# Patient Record
Sex: FEMALE | Race: BLACK OR AFRICAN AMERICAN
[De-identification: names, ages, dates, MRNs, and addresses within clinical notes are randomized per-mention and may not be internally consistent; named-entity substitution may affect disease eponyms.]

---

## 2019-11-26 ENCOUNTER — RESULT REVIEW (OUTPATIENT)
Age: 54
End: 2019-11-26

## 2020-02-15 RX ADMIN — INSULIN GLARGINE 7 UNIT(S): 100 INJECTION, SOLUTION SUBCUTANEOUS at 22:49

## 2020-02-18 ENCOUNTER — INPATIENT (INPATIENT)
Facility: HOSPITAL | Age: 55
LOS: 5 days | Discharge: HOME HEALTH | End: 2020-02-24
Attending: HOSPITALIST | Admitting: HOSPITALIST
Payer: MEDICARE

## 2020-02-18 VITALS
HEART RATE: 114 BPM | OXYGEN SATURATION: 88 % | DIASTOLIC BLOOD PRESSURE: 76 MMHG | TEMPERATURE: 102 F | RESPIRATION RATE: 16 BRPM | SYSTOLIC BLOOD PRESSURE: 162 MMHG

## 2020-02-18 LAB
ALBUMIN SERPL ELPH-MCNC: 4.7 G/DL — SIGNIFICANT CHANGE UP (ref 3.5–5.2)
ALP SERPL-CCNC: 80 U/L — SIGNIFICANT CHANGE UP (ref 30–115)
ALT FLD-CCNC: 23 U/L — SIGNIFICANT CHANGE UP (ref 0–41)
ANION GAP SERPL CALC-SCNC: 14 MMOL/L — SIGNIFICANT CHANGE UP (ref 7–14)
APPEARANCE UR: ABNORMAL
AST SERPL-CCNC: 31 U/L — SIGNIFICANT CHANGE UP (ref 0–41)
BACTERIA # UR AUTO: ABNORMAL
BILIRUB SERPL-MCNC: 0.8 MG/DL — SIGNIFICANT CHANGE UP (ref 0.2–1.2)
BILIRUB UR-MCNC: ABNORMAL
BUN SERPL-MCNC: 12 MG/DL — SIGNIFICANT CHANGE UP (ref 10–20)
CALCIUM SERPL-MCNC: 9.9 MG/DL — SIGNIFICANT CHANGE UP (ref 8.5–10.1)
CHLORIDE SERPL-SCNC: 99 MMOL/L — SIGNIFICANT CHANGE UP (ref 98–110)
CK MB CFR SERPL CALC: 1.8 NG/ML — SIGNIFICANT CHANGE UP (ref 0.6–6.3)
CK SERPL-CCNC: 124 U/L — SIGNIFICANT CHANGE UP (ref 0–225)
CO2 SERPL-SCNC: 27 MMOL/L — SIGNIFICANT CHANGE UP (ref 17–32)
COLOR SPEC: ABNORMAL
CREAT SERPL-MCNC: 1.1 MG/DL — SIGNIFICANT CHANGE UP (ref 0.7–1.5)
DIFF PNL FLD: SIGNIFICANT CHANGE UP
EPI CELLS # UR: 21 /HPF — HIGH (ref 0–5)
FLU A RESULT: NEGATIVE — SIGNIFICANT CHANGE UP
FLU A RESULT: NEGATIVE — SIGNIFICANT CHANGE UP
FLUAV AG NPH QL: NEGATIVE — SIGNIFICANT CHANGE UP
FLUBV AG NPH QL: NEGATIVE — SIGNIFICANT CHANGE UP
GAS PNL BLDV: SIGNIFICANT CHANGE UP
GLUCOSE SERPL-MCNC: 138 MG/DL — HIGH (ref 70–99)
GLUCOSE UR QL: NEGATIVE — SIGNIFICANT CHANGE UP
HCT VFR BLD CALC: 43.6 % — SIGNIFICANT CHANGE UP (ref 37–47)
HGB BLD-MCNC: 14.5 G/DL — SIGNIFICANT CHANGE UP (ref 12–16)
HYALINE CASTS # UR AUTO: 41 /LPF — HIGH (ref 0–7)
KETONES UR-MCNC: SIGNIFICANT CHANGE UP
LEUKOCYTE ESTERASE UR-ACNC: NEGATIVE — SIGNIFICANT CHANGE UP
MCHC RBC-ENTMCNC: 29.2 PG — SIGNIFICANT CHANGE UP (ref 27–31)
MCHC RBC-ENTMCNC: 33.3 G/DL — SIGNIFICANT CHANGE UP (ref 32–37)
MCV RBC AUTO: 87.9 FL — SIGNIFICANT CHANGE UP (ref 81–99)
NITRITE UR-MCNC: NEGATIVE — SIGNIFICANT CHANGE UP
NRBC # BLD: 0 /100 WBCS — SIGNIFICANT CHANGE UP (ref 0–0)
NT-PROBNP SERPL-SCNC: 2523 PG/ML — HIGH (ref 0–300)
PH UR: 6.5 — SIGNIFICANT CHANGE UP (ref 5–8)
PLATELET # BLD AUTO: 220 K/UL — SIGNIFICANT CHANGE UP (ref 130–400)
POTASSIUM SERPL-MCNC: 4 MMOL/L — SIGNIFICANT CHANGE UP (ref 3.5–5)
POTASSIUM SERPL-SCNC: 4 MMOL/L — SIGNIFICANT CHANGE UP (ref 3.5–5)
PROT SERPL-MCNC: 8.2 G/DL — HIGH (ref 6–8)
PROT UR-MCNC: ABNORMAL
RBC # BLD: 4.96 M/UL — SIGNIFICANT CHANGE UP (ref 4.2–5.4)
RBC # FLD: 15.8 % — HIGH (ref 11.5–14.5)
RBC CASTS # UR COMP ASSIST: 3 /HPF — SIGNIFICANT CHANGE UP (ref 0–4)
RSV RESULT: POSITIVE
RSV RNA RESP QL NAA+PROBE: POSITIVE
SODIUM SERPL-SCNC: 140 MMOL/L — SIGNIFICANT CHANGE UP (ref 135–146)
SP GR SPEC: 1.03 — HIGH (ref 1.01–1.02)
TROPONIN T SERPL-MCNC: 0.01 NG/ML — SIGNIFICANT CHANGE UP
TROPONIN T SERPL-MCNC: 0.02 NG/ML — HIGH
UROBILINOGEN FLD QL: ABNORMAL
WBC # BLD: 6.59 K/UL — SIGNIFICANT CHANGE UP (ref 4.8–10.8)
WBC # FLD AUTO: 6.59 K/UL — SIGNIFICANT CHANGE UP (ref 4.8–10.8)
WBC UR QL: 28 /HPF — HIGH (ref 0–5)

## 2020-02-18 PROCEDURE — 93010 ELECTROCARDIOGRAM REPORT: CPT

## 2020-02-18 PROCEDURE — 99285 EMERGENCY DEPT VISIT HI MDM: CPT | Mod: 25

## 2020-02-18 PROCEDURE — 36000 PLACE NEEDLE IN VEIN: CPT

## 2020-02-18 PROCEDURE — 71045 X-RAY EXAM CHEST 1 VIEW: CPT | Mod: 26

## 2020-02-18 RX ORDER — FUROSEMIDE 40 MG
40 TABLET ORAL
Refills: 0 | Status: DISCONTINUED | OUTPATIENT
Start: 2020-02-18 | End: 2020-02-21

## 2020-02-18 RX ORDER — FUROSEMIDE 40 MG
1 TABLET ORAL
Qty: 0 | Refills: 0 | DISCHARGE

## 2020-02-18 RX ORDER — SODIUM CHLORIDE 9 MG/ML
1000 INJECTION INTRAMUSCULAR; INTRAVENOUS; SUBCUTANEOUS ONCE
Refills: 0 | Status: DISCONTINUED | OUTPATIENT
Start: 2020-02-18 | End: 2020-02-18

## 2020-02-18 RX ORDER — SODIUM CHLORIDE 9 MG/ML
500 INJECTION, SOLUTION INTRAVENOUS ONCE
Refills: 0 | Status: COMPLETED | OUTPATIENT
Start: 2020-02-18 | End: 2020-02-18

## 2020-02-18 RX ORDER — FUROSEMIDE 40 MG
40 TABLET ORAL ONCE
Refills: 0 | Status: COMPLETED | OUTPATIENT
Start: 2020-02-18 | End: 2020-02-18

## 2020-02-18 RX ORDER — CHLORHEXIDINE GLUCONATE 213 G/1000ML
1 SOLUTION TOPICAL
Refills: 0 | Status: DISCONTINUED | OUTPATIENT
Start: 2020-02-18 | End: 2020-02-24

## 2020-02-18 RX ORDER — ASPIRIN/CALCIUM CARB/MAGNESIUM 324 MG
162 TABLET ORAL ONCE
Refills: 0 | Status: COMPLETED | OUTPATIENT
Start: 2020-02-18 | End: 2020-02-18

## 2020-02-18 RX ORDER — METOPROLOL TARTRATE 50 MG
50 TABLET ORAL
Refills: 0 | Status: DISCONTINUED | OUTPATIENT
Start: 2020-02-18 | End: 2020-02-24

## 2020-02-18 RX ORDER — AZITHROMYCIN 500 MG/1
500 TABLET, FILM COATED ORAL ONCE
Refills: 0 | Status: COMPLETED | OUTPATIENT
Start: 2020-02-18 | End: 2020-02-18

## 2020-02-18 RX ORDER — PANTOPRAZOLE SODIUM 20 MG/1
40 TABLET, DELAYED RELEASE ORAL
Refills: 0 | Status: DISCONTINUED | OUTPATIENT
Start: 2020-02-18 | End: 2020-02-24

## 2020-02-18 RX ORDER — AMLODIPINE BESYLATE 2.5 MG/1
10 TABLET ORAL DAILY
Refills: 0 | Status: DISCONTINUED | OUTPATIENT
Start: 2020-02-18 | End: 2020-02-24

## 2020-02-18 RX ORDER — ACETAMINOPHEN 500 MG
650 TABLET ORAL EVERY 6 HOURS
Refills: 0 | Status: DISCONTINUED | OUTPATIENT
Start: 2020-02-18 | End: 2020-02-24

## 2020-02-18 RX ORDER — LISINOPRIL 2.5 MG/1
40 TABLET ORAL DAILY
Refills: 0 | Status: DISCONTINUED | OUTPATIENT
Start: 2020-02-18 | End: 2020-02-24

## 2020-02-18 RX ORDER — ASPIRIN/CALCIUM CARB/MAGNESIUM 324 MG
81 TABLET ORAL DAILY
Refills: 0 | Status: DISCONTINUED | OUTPATIENT
Start: 2020-02-19 | End: 2020-02-24

## 2020-02-18 RX ORDER — OXYBUTYNIN CHLORIDE 5 MG
5 TABLET ORAL
Refills: 0 | Status: DISCONTINUED | OUTPATIENT
Start: 2020-02-18 | End: 2020-02-24

## 2020-02-18 RX ORDER — ACETAMINOPHEN 500 MG
650 TABLET ORAL ONCE
Refills: 0 | Status: COMPLETED | OUTPATIENT
Start: 2020-02-18 | End: 2020-02-18

## 2020-02-18 RX ORDER — IPRATROPIUM/ALBUTEROL SULFATE 18-103MCG
3 AEROSOL WITH ADAPTER (GRAM) INHALATION EVERY 4 HOURS
Refills: 0 | Status: DISCONTINUED | OUTPATIENT
Start: 2020-02-18 | End: 2020-02-24

## 2020-02-18 RX ORDER — CLOPIDOGREL BISULFATE 75 MG/1
75 TABLET, FILM COATED ORAL DAILY
Refills: 0 | Status: DISCONTINUED | OUTPATIENT
Start: 2020-02-18 | End: 2020-02-24

## 2020-02-18 RX ORDER — QUETIAPINE FUMARATE 200 MG/1
100 TABLET, FILM COATED ORAL AT BEDTIME
Refills: 0 | Status: DISCONTINUED | OUTPATIENT
Start: 2020-02-18 | End: 2020-02-24

## 2020-02-18 RX ORDER — ENOXAPARIN SODIUM 100 MG/ML
40 INJECTION SUBCUTANEOUS AT BEDTIME
Refills: 0 | Status: DISCONTINUED | OUTPATIENT
Start: 2020-02-18 | End: 2020-02-24

## 2020-02-18 RX ORDER — BUDESONIDE AND FORMOTEROL FUMARATE DIHYDRATE 160; 4.5 UG/1; UG/1
2 AEROSOL RESPIRATORY (INHALATION)
Refills: 0 | Status: DISCONTINUED | OUTPATIENT
Start: 2020-02-18 | End: 2020-02-24

## 2020-02-18 RX ORDER — RISPERIDONE 4 MG/1
3 TABLET ORAL
Refills: 0 | Status: DISCONTINUED | OUTPATIENT
Start: 2020-02-18 | End: 2020-02-24

## 2020-02-18 RX ORDER — CEFTRIAXONE 500 MG/1
1000 INJECTION, POWDER, FOR SOLUTION INTRAMUSCULAR; INTRAVENOUS ONCE
Refills: 0 | Status: COMPLETED | OUTPATIENT
Start: 2020-02-18 | End: 2020-02-18

## 2020-02-18 RX ORDER — RISPERIDONE 4 MG/1
1 TABLET ORAL DAILY
Refills: 0 | Status: DISCONTINUED | OUTPATIENT
Start: 2020-02-19 | End: 2020-02-24

## 2020-02-18 RX ORDER — ATORVASTATIN CALCIUM 80 MG/1
80 TABLET, FILM COATED ORAL AT BEDTIME
Refills: 0 | Status: DISCONTINUED | OUTPATIENT
Start: 2020-02-18 | End: 2020-02-24

## 2020-02-18 RX ORDER — IPRATROPIUM/ALBUTEROL SULFATE 18-103MCG
3 AEROSOL WITH ADAPTER (GRAM) INHALATION
Refills: 0 | Status: COMPLETED | OUTPATIENT
Start: 2020-02-18 | End: 2020-02-18

## 2020-02-18 RX ADMIN — ATORVASTATIN CALCIUM 80 MILLIGRAM(S): 80 TABLET, FILM COATED ORAL at 21:44

## 2020-02-18 RX ADMIN — Medication 650 MILLIGRAM(S): at 15:00

## 2020-02-18 RX ADMIN — BUDESONIDE AND FORMOTEROL FUMARATE DIHYDRATE 2 PUFF(S): 160; 4.5 AEROSOL RESPIRATORY (INHALATION) at 21:43

## 2020-02-18 RX ADMIN — SODIUM CHLORIDE 500 MILLILITER(S): 9 INJECTION, SOLUTION INTRAVENOUS at 15:28

## 2020-02-18 RX ADMIN — Medication 50 MILLIGRAM(S): at 21:44

## 2020-02-18 RX ADMIN — Medication 5 MILLIGRAM(S): at 21:44

## 2020-02-18 RX ADMIN — AZITHROMYCIN 500 MILLIGRAM(S): 500 TABLET, FILM COATED ORAL at 16:17

## 2020-02-18 RX ADMIN — Medication 3 MILLILITER(S): at 20:30

## 2020-02-18 RX ADMIN — Medication 3 MILLILITER(S): at 15:23

## 2020-02-18 RX ADMIN — Medication 30 MILLIGRAM(S): at 21:44

## 2020-02-18 RX ADMIN — Medication 3 MILLILITER(S): at 15:00

## 2020-02-18 RX ADMIN — ENOXAPARIN SODIUM 40 MILLIGRAM(S): 100 INJECTION SUBCUTANEOUS at 21:44

## 2020-02-18 RX ADMIN — Medication 3 MILLILITER(S): at 14:50

## 2020-02-18 RX ADMIN — QUETIAPINE FUMARATE 100 MILLIGRAM(S): 200 TABLET, FILM COATED ORAL at 21:44

## 2020-02-18 RX ADMIN — CEFTRIAXONE 100 MILLIGRAM(S): 500 INJECTION, POWDER, FOR SOLUTION INTRAMUSCULAR; INTRAVENOUS at 16:17

## 2020-02-18 RX ADMIN — Medication 162 MILLIGRAM(S): at 19:13

## 2020-02-18 RX ADMIN — Medication 40 MILLIGRAM(S): at 19:13

## 2020-02-18 NOTE — ED PROVIDER NOTE - PROGRESS NOTE DETAILS
Patient states symptoms improved after the neb treatments.   Spoke with cardiology fellow regarding EKG/lab findings; cleared patient to go to tele.

## 2020-02-18 NOTE — ED PROCEDURE NOTE - US CPT CODES
11634 Echocardiography Transthoracic with Image 2D (Echo/FAST)
85406 US Chest (PTX, Pleural Effussion/CHF vs COPD)
02419 Guidance for Vascular Access

## 2020-02-18 NOTE — ED PROVIDER NOTE - NS ED ROS FT
Eyes:  No visual changes, eye pain or discharge.  ENMT:  No hearing changes, pain, discharge or infections. No neck pain or stiffness.  Cardiac:  No chest pain, +SOB, no edema. No chest pain with exertion.  Respiratory:  + cough, no respiratory distress. No hemoptysis. No history of asthma or RAD.  GI:  No nausea, vomiting, diarrhea or abdominal pain.  :  No dysuria, frequency or burning.  MS:  No myalgia, muscle weakness, joint pain or back pain.  Neuro:  No headache or weakness.  No LOC.  Skin:  No skin rash.   Endocrine: No history of thyroid disease or diabetes.

## 2020-02-18 NOTE — H&P ADULT - REASON FOR ADMISSION
CHF Exacerbation; RSV infection CHF Exacerbation; COPD Exacerbation due to RSV infection; Elevated Troponin

## 2020-02-18 NOTE — H&P ADULT - NSHPSOCIALHISTORY_GEN_ALL_CORE
:  Smoke:  Alcohol:  Drugs: :  Smoke: Active; 5 cigarettes per day; Used to smoke 1 pack per day for the last 30 years  Alcohol: Denied  Drugs: Denied

## 2020-02-18 NOTE — ED PROVIDER NOTE - PHYSICAL EXAMINATION
CONSTITUTIONAL: Well-developed; well-nourished; in no acute distress.   SKIN: warm, dry  HEAD: Normocephalic; atraumatic.  EYES: PERRL, EOMI, normal sclera and conjunctiva   ENT: No nasal discharge; airway clear.  NECK: Supple; non tender.  CARD: S1, S2 normal; no murmurs, gallops, or rubs. increased rate and normal rhythm.   RESP: +wheezing and crackles bilaterally.   ABD: soft ntnd  EXT: Normal ROM.  No clubbing, cyanosis or edema.   LYMPH: No acute cervical adenopathy.  NEURO: Alert, oriented, grossly unremarkable  PSYCH: Cooperative, appropriate.

## 2020-02-18 NOTE — H&P ADULT - NSHPPHYSICALEXAM_GEN_ALL_CORE
:  VITAL SIGNS: Last 24 Hours  T(C): 38.2 (18 Feb 2020 17:58), Max: 38.9 (18 Feb 2020 12:31)  T(F): 100.7 (18 Feb 2020 17:58), Max: 102 (18 Feb 2020 12:31)  HR: 108 (18 Feb 2020 16:00) (108 - 114)  BP: 148/74 (18 Feb 2020 16:00) (148/74 - 162/76)  BP(mean): --  RR: 17 (18 Feb 2020 16:00) (16 - 17)  SpO2: 95% (18 Feb 2020 16:00) (88% - 95%)    PHYSICAL EXAM:  GENERAL:   Awake, alert; NAD.  HEENT:  Head NC/AT; Conjunctivae pink, Sclera anicteric; Oral mucosa moist.  CARDIO:   Regular rate; Regular rhythm; S1 & S2.  RESP:   No rales or rhonchi appreciated.  GI:   Soft; NT/ND; BS; No guarding; No rebound tenderness.  EXT:   No edema in UE and LE.  NEURO:   PERRL.  SKIN:   Intact. :  VITAL SIGNS: Last 24 Hours  T(C): 38.2 (18 Feb 2020 17:58), Max: 38.9 (18 Feb 2020 12:31)  T(F): 100.7 (18 Feb 2020 17:58), Max: 102 (18 Feb 2020 12:31)  HR: 108 (18 Feb 2020 16:00) (108 - 114)  BP: 148/74 (18 Feb 2020 16:00) (148/74 - 162/76)  RR: 17 (18 Feb 2020 16:00) (16 - 17)  SpO2: 95% (18 Feb 2020 16:00) (88% - 95%)    PHYSICAL EXAM:  GENERAL:   Awake, alert; NAD.  HEENT:  Head NC/AT; Conjunctivae pink, Sclera anicteric; Oral mucosa moist.  CARDIO:   Regular rate; Regular rhythm; S1 & S2; No murmur heard but unsure given extensive wheezing/rhonchi.  RESP:   Diffuse BL Wheeze/Rhonchi.  GI:   Soft; NT/ND; BS; No guarding; No rebound tenderness; Obese.  EXT:   No edema in LE.

## 2020-02-18 NOTE — ED PROVIDER NOTE - OBJECTIVE STATEMENT
The patient is a 54 year old female with a history of CHF on 4L o2 at home, COPD, DM, HTN, CVA, bipolar depression presenting for shortness of breath since yesterday. Patient thought she was having a panic attack. Associated with cough, congestion, subjective fever; took dayquil with mild improvement of symptoms. No chest pain, no lower extremity edema.

## 2020-02-18 NOTE — H&P ADULT - ATTENDING COMMENTS
Health Aide present by bedside.    PHYSICAL EXAM:    CONSTITUTIONAL: NAD  ENMT: EOMI, PERRLA, No tonsillar erythema, exudates, or enlargement, neck supple, No JVD  PSYCH: Alert & Oriented X3  RESPIRATORY: Decreased bilateral air entry, minimal diffuse rales negative wheezing  CARDIOVASCULAR: Regular rate and rhythm; No murmurs, rubs, or gallops, negative edema  GASTROINTESTINAL: Soft, Nontender, Nondistended; Bowel sounds present  EXTREMITIES:  2+ Peripheral Pulses, No clubbing, cyanosis  SKIN: No rashes or lesions    55 yo F with history of CVA x8, Unspecified CHF, COPD on 4L home oxygen, DM II, HTN, HLD, Anxiety, Depression presented with acute onset shortness of breath associated with productive cough of greenish sputum, subjective fevers and chills since morning of presentation. Patient initially attributed symptoms to an anxiety attack. Patient denies known sick contacts, recent travel, chest pain, palpitations.     #COPD Exacerbation secondary to RSV: Supplemental oxygen as needed, maintain SaO2>92%, nebs standing and PRN, pulmonary toilet, incentive spirometry, Symbicort, anti-tussive, antipyretic, continue prednisone taper     #Unspecified CHF exacerbation, pulmonary vascular congestion noted on CXR: f/u 2D echo, AM CXR, continue Lasix 40mg IVP BID, strict intake and outputs, daily weights    #Troponemia: New LBBB noted on EKG, f/u repeat cardiac enzymes, patient denies chest pain, Cardiology evaluation     #History of Multiple CVA, DM II, HTN, Anxiety, Depression: Continue home medications    Disposition: Home when medically stable; continue outpatient PT/OT. Health Aide present by bedside.    PHYSICAL EXAM:    CONSTITUTIONAL: NAD  ENMT: EOMI, PERRLA, No tonsillar erythema, exudates, or enlargement, neck supple, No JVD  PSYCH: Alert & Oriented X3  RESPIRATORY: Decreased bilateral air entry, minimal diffuse rales negative wheezing  CARDIOVASCULAR: Regular rate and rhythm; No murmurs, rubs, or gallops, negative edema  GASTROINTESTINAL: Soft, Nontender, Nondistended; Bowel sounds present  EXTREMITIES:  2+ Peripheral Pulses, No clubbing, cyanosis  SKIN: No rashes or lesions    55 yo F with history of CVA x8, Unspecified CHF, COPD on 4L home oxygen, DM II, HTN, HLD, Anxiety, Depression presented with acute onset shortness of breath associated with productive cough of greenish sputum, subjective fevers and chills since morning of presentation. Patient initially attributed symptoms to an anxiety attack. Patient denies known sick contacts, recent travel, chest pain, palpitations.     #COPD Exacerbation secondary to RSV: Supplemental oxygen as needed, maintain SaO2>92%, nebs standing and PRN, pulmonary toilet, incentive spirometry, Symbicort, anti-tussive, antipyretic, continue prednisone taper     #Unspecified CHF exacerbation, pulmonary vascular congestion noted on CXR: f/u 2D echo, AM CXR, continue Lasix 40mg IVP BID, strict intake and outputs, daily weights    #Troponemia: New LBBB noted on EKG, f/u repeat cardiac enzymes, patient denies chest pain, Cardiology evaluation     #History of Multiple CVA, DM II, HTN, Anxiety, Depression: Continue home medications    Tobacco cessation counseled.     Disposition: Home when medically stable; continue outpatient PT/OT.

## 2020-02-18 NOTE — H&P ADULT - NSHPLABSRESULTS_GEN_ALL_CORE
:  LAB RESULTS:                        14.5   6.59  )-----------( 220      ( 2020 13:50 )             43.6         140  |  99  |  12  ----------------------------<  138<H>  4.0   |  27  |  1.1    Ca    9.9      2020 13:50    TPro  8.2<H>  /  Alb  4.7  /  TBili  0.8  /  DBili  x   /  AST  31  /  ALT  23  /  AlkPhos  80      Urinalysis Basic - ( 2020 14:50 )  Color: Odilia / Appearance: Turbid / S.031 / pH: x  Gluc: x / Ketone: Trace  / Bili: Small / Urobili: 6 mg/dL   Blood: x / Protein: 300 mg/dL / Nitrite: Negative   Leuk Esterase: Negative / RBC: 3 /HPF / WBC 28 /HPF   Sq Epi: x / Non Sq Epi: 21 /HPF / Bacteria: Many    Troponin T, Serum: 0.02 ng/mL <H> (20 @ 13:50)    MICROBIOLOGY:  None    RADIOLOGY:  Xray Chest 1 View-PORTABLE IMMEDIATE (20 @ 15:54)     Impression:    Pulmonary vascular congestion.    ALLERGIES:  No Known Allergies    ===========================================================

## 2020-02-18 NOTE — H&P ADULT - ASSESSMENT
CHF Exacerbation: Continue IV Lasix; Follow up AM CXR; Daily weight; Low sodium; Fluid restriction; Strict I+O    COPD Exacerbation due to viral syndrome; RSV positive: Duonebs standing Q4H; Continue Prednisone 40mg daily x5 days; Contact precautions; Tylenol PRN for fever    Elevated troponin: New LBBB on EKG with mild troponin; Aspirin load given; ASA 81mg PO daily; Follow CE trend and Serial EKG; Continue Telemetry monitoring; Cardiology aware of patient    DVT ppx:  GI ppx: Protonix  Diet: DASH/Carb consistent; Fluid restricted; high fiber  Activity: As tolerated CHF Exacerbation: CXR with pulmonary vascular congestion; Elevated BNP; Continue home Metoprolol; Continue IV Lasix BID; Follow up AM CXR; Daily weight; Low sodium; Fluid restriction; Strict I+O    COPD Exacerbation due to viral syndrome; RSV positive: Patient unsure of what inhalers she uses, and her pharmacy does not have record of her inhalers; Start Symbicort; Duonebs standing Q4H; Continue Prednisone 40mg daily x5 days; Contact precautions; Tylenol PRN for fever    Elevated troponin: New LBBB on EKG with mild troponin; Aspirin load given; ASA 81mg PO daily; Follow CE trend and Serial EKG; Continue Telemetry monitoring; Cardiology aware of patient    Hx of Multiple CVA  Hx of T2DM  Hx of HTN  Hx of HLD  Hx of Anxiety and Depression    DVT ppx: Lovenox  GI ppx: Protonix  Diet: DASH/Carb consistent; Fluid restricted; high fiber  Activity: As tolerated CHF Exacerbation: CXR with pulmonary vascular congestion; Elevated BNP; Continue home Metoprolol; Continue IV Lasix BID; Follow up AM CXR; Daily weight; Low sodium; Fluid restriction; Strict I+O    COPD Exacerbation due to viral syndrome; RSV positive: Patient unsure of what inhalers she uses, and her pharmacy does not have record of her inhalers; Start Symbicort; Duonebs standing Q4H; Continue Prednisone 40mg daily x5 days; Contact precautions; Tylenol PRN for fever    Elevated troponin: New LBBB on EKG with mild troponin; Aspirin load given; ASA 81mg PO daily; Follow CE trend and Serial EKG; Continue Telemetry monitoring; Cardiology aware of patient    Hx of Multiple CVA  Hx of T2DM  Hx of CKD Stage II  Hx of HTN  Hx of HLD  Hx of Anxiety and Depression    *** Patient is a poor historian; Unable to remember her medications and diagnoses; She sees a pulmonologist and cardiologist affiliated with [facility], but does not remember their names. ***    DVT ppx: Lovenox  GI ppx: Protonix  Diet: DASH/Carb consistent; Fluid restricted; high fiber  Activity: As tolerated

## 2020-02-18 NOTE — ED PROVIDER NOTE - CLINICAL SUMMARY MEDICAL DECISION MAKING FREE TEXT BOX
pt w/hx copd, chf, cad here for sob/cough/congestion. pt febrile/tachycardic in ed. pt initially treated for CAP w/ fluids/azithro/ceftriaxone as well as copd exacerbation. pt found to be rsv positive.

## 2020-02-18 NOTE — ED PROCEDURE NOTE - ATTENDING CONTRIBUTION TO CARE
I was physically present for and directly supervised this procedure.  Procedure was done as documented without any complications.

## 2020-02-18 NOTE — H&P ADULT - HISTORY OF PRESENT ILLNESS
PMHx:  CVA (multiple); CHF; COPD on home O2; T2DM; HTN; HLD; Anxiety/Depression    This is a 54 year old female who presented with a cc/o dyspnea. She was not feeling well and was finding it increasingly more difficult to breath since the morning of presentation. She also complained of subjective fever, sinus pressure, productive cough (green sputum), and wheezing, which prompted her to come to the ED for further evaluation. In the ED, she was hemodynamically stable, but with a fever (100.7F). CXR showed pulmonary vascular congestion. RSV positive.

## 2020-02-18 NOTE — H&P ADULT - NSICDXPASTMEDICALHX_GEN_ALL_CORE_FT
PAST MEDICAL HISTORY:  Anxiety and depression     Cerebrovascular accident (CVA) Multiple    CHF (congestive heart failure)     COPD, severe     Hyperlipidemia     Hypertension     Type 2 diabetes mellitus PAST MEDICAL HISTORY:  Anxiety and depression     Cerebrovascular accident (CVA) Multiple    CHF (congestive heart failure)     CKD (chronic kidney disease), stage II     COPD, severe     Hyperlipidemia     Hypertension     Type 2 diabetes mellitus

## 2020-02-18 NOTE — ED PROVIDER NOTE - ATTENDING CONTRIBUTION TO CARE
53 yo f hx copd 4L home o2, chf, dm, htn, cad?, bipolar, depression, cva  pt has home health aid.  pt states she felt SOB yesterday. pt thought it was panic attack. pt states sx continued today so she wanted eval. associated, cough, congestion, subj fever. pt took dayquil over the past 2 days for sx which did not improve sx. pt was at PT today, BP was high and pt was urged to come in. no cp, syncope, palpitations    vs febrile/tachycardic, hypoxemic  gen- mild resp distress, aaox3  card-tachy/regular  lungs-increased WOB, diffuse wheezing/cracking  abd-sntnd, no guarding or rebound  neuro- L face numbness, LUE weakness (old from previous cva, no changes per aid) otherwise nml str to RUE/Lower extremities, Full sensation to upper/lower extremities, CN otherwise intact  LE- no edema b/l    c/f r/o pna/viral infection, copd exacerbation, chf, r/o acs  will get basic labs, trop/bnp, cxr, vbg, fluids, blood ctx, ua, urine ctx, ekg  abx for likely pulm source, pt not clinically overloaded so will trial fluids, antipyretics, nebs/steroids for copd, will provide supportive care, serial exam and ED observation period    in chart review- 2016 stress w/ EF 55% and reversible defect. unclear if pt received procedure

## 2020-02-19 LAB
ALBUMIN SERPL ELPH-MCNC: 3.7 G/DL — SIGNIFICANT CHANGE UP (ref 3.5–5.2)
ALP SERPL-CCNC: 60 U/L — SIGNIFICANT CHANGE UP (ref 30–115)
ALT FLD-CCNC: 18 U/L — SIGNIFICANT CHANGE UP (ref 0–41)
ANION GAP SERPL CALC-SCNC: 15 MMOL/L — HIGH (ref 7–14)
AST SERPL-CCNC: 19 U/L — SIGNIFICANT CHANGE UP (ref 0–41)
BASOPHILS # BLD AUTO: 0.02 K/UL — SIGNIFICANT CHANGE UP (ref 0–0.2)
BASOPHILS NFR BLD AUTO: 0.4 % — SIGNIFICANT CHANGE UP (ref 0–1)
BILIRUB SERPL-MCNC: 0.4 MG/DL — SIGNIFICANT CHANGE UP (ref 0.2–1.2)
BUN SERPL-MCNC: 14 MG/DL — SIGNIFICANT CHANGE UP (ref 10–20)
CALCIUM SERPL-MCNC: 9.2 MG/DL — SIGNIFICANT CHANGE UP (ref 8.5–10.1)
CHLORIDE SERPL-SCNC: 101 MMOL/L — SIGNIFICANT CHANGE UP (ref 98–110)
CO2 SERPL-SCNC: 26 MMOL/L — SIGNIFICANT CHANGE UP (ref 17–32)
CREAT SERPL-MCNC: 0.8 MG/DL — SIGNIFICANT CHANGE UP (ref 0.7–1.5)
EOSINOPHIL # BLD AUTO: 0 K/UL — SIGNIFICANT CHANGE UP (ref 0–0.7)
EOSINOPHIL NFR BLD AUTO: 0 % — SIGNIFICANT CHANGE UP (ref 0–8)
GLUCOSE BLDC GLUCOMTR-MCNC: 153 MG/DL — HIGH (ref 70–99)
GLUCOSE BLDC GLUCOMTR-MCNC: 169 MG/DL — HIGH (ref 70–99)
GLUCOSE BLDC GLUCOMTR-MCNC: 177 MG/DL — HIGH (ref 70–99)
GLUCOSE SERPL-MCNC: 188 MG/DL — HIGH (ref 70–99)
HCT VFR BLD CALC: 37.4 % — SIGNIFICANT CHANGE UP (ref 37–47)
HGB BLD-MCNC: 12 G/DL — SIGNIFICANT CHANGE UP (ref 12–16)
IMM GRANULOCYTES NFR BLD AUTO: 0.4 % — HIGH (ref 0.1–0.3)
LYMPHOCYTES # BLD AUTO: 0.96 K/UL — LOW (ref 1.2–3.4)
LYMPHOCYTES # BLD AUTO: 19.2 % — LOW (ref 20.5–51.1)
MAGNESIUM SERPL-MCNC: 1.8 MG/DL — SIGNIFICANT CHANGE UP (ref 1.8–2.4)
MCHC RBC-ENTMCNC: 28.4 PG — SIGNIFICANT CHANGE UP (ref 27–31)
MCHC RBC-ENTMCNC: 32.1 G/DL — SIGNIFICANT CHANGE UP (ref 32–37)
MCV RBC AUTO: 88.6 FL — SIGNIFICANT CHANGE UP (ref 81–99)
MONOCYTES # BLD AUTO: 0.17 K/UL — SIGNIFICANT CHANGE UP (ref 0.1–0.6)
MONOCYTES NFR BLD AUTO: 3.4 % — SIGNIFICANT CHANGE UP (ref 1.7–9.3)
NEUTROPHILS # BLD AUTO: 3.83 K/UL — SIGNIFICANT CHANGE UP (ref 1.4–6.5)
NEUTROPHILS NFR BLD AUTO: 76.6 % — HIGH (ref 42.2–75.2)
NRBC # BLD: 0 /100 WBCS — SIGNIFICANT CHANGE UP (ref 0–0)
PHOSPHATE SERPL-MCNC: 5.3 MG/DL — HIGH (ref 2.1–4.9)
PLATELET # BLD AUTO: 183 K/UL — SIGNIFICANT CHANGE UP (ref 130–400)
POTASSIUM SERPL-MCNC: 4.2 MMOL/L — SIGNIFICANT CHANGE UP (ref 3.5–5)
POTASSIUM SERPL-SCNC: 4.2 MMOL/L — SIGNIFICANT CHANGE UP (ref 3.5–5)
PROT SERPL-MCNC: 6.6 G/DL — SIGNIFICANT CHANGE UP (ref 6–8)
RBC # BLD: 4.22 M/UL — SIGNIFICANT CHANGE UP (ref 4.2–5.4)
RBC # FLD: 15.7 % — HIGH (ref 11.5–14.5)
SODIUM SERPL-SCNC: 142 MMOL/L — SIGNIFICANT CHANGE UP (ref 135–146)
TROPONIN T SERPL-MCNC: <0.01 NG/ML — SIGNIFICANT CHANGE UP
WBC # BLD: 5 K/UL — SIGNIFICANT CHANGE UP (ref 4.8–10.8)
WBC # FLD AUTO: 5 K/UL — SIGNIFICANT CHANGE UP (ref 4.8–10.8)

## 2020-02-19 PROCEDURE — 99223 1ST HOSP IP/OBS HIGH 75: CPT

## 2020-02-19 PROCEDURE — 99497 ADVNCD CARE PLAN 30 MIN: CPT | Mod: 25

## 2020-02-19 PROCEDURE — 93306 TTE W/DOPPLER COMPLETE: CPT | Mod: 26

## 2020-02-19 PROCEDURE — 71045 X-RAY EXAM CHEST 1 VIEW: CPT | Mod: 26

## 2020-02-19 RX ORDER — ACETAMINOPHEN 500 MG
650 TABLET ORAL ONCE
Refills: 0 | Status: DISCONTINUED | OUTPATIENT
Start: 2020-02-19 | End: 2020-02-19

## 2020-02-19 RX ADMIN — RISPERIDONE 1 MILLIGRAM(S): 4 TABLET ORAL at 12:19

## 2020-02-19 RX ADMIN — ENOXAPARIN SODIUM 40 MILLIGRAM(S): 100 INJECTION SUBCUTANEOUS at 21:10

## 2020-02-19 RX ADMIN — Medication 3 MILLILITER(S): at 05:42

## 2020-02-19 RX ADMIN — CLOPIDOGREL BISULFATE 75 MILLIGRAM(S): 75 TABLET, FILM COATED ORAL at 12:20

## 2020-02-19 RX ADMIN — LISINOPRIL 40 MILLIGRAM(S): 2.5 TABLET ORAL at 05:37

## 2020-02-19 RX ADMIN — Medication 5 MILLIGRAM(S): at 05:38

## 2020-02-19 RX ADMIN — Medication 3 MILLILITER(S): at 08:39

## 2020-02-19 RX ADMIN — Medication 50 MILLIGRAM(S): at 05:41

## 2020-02-19 RX ADMIN — QUETIAPINE FUMARATE 100 MILLIGRAM(S): 200 TABLET, FILM COATED ORAL at 21:10

## 2020-02-19 RX ADMIN — Medication 50 MILLIGRAM(S): at 17:35

## 2020-02-19 RX ADMIN — Medication 81 MILLIGRAM(S): at 12:19

## 2020-02-19 RX ADMIN — AMLODIPINE BESYLATE 10 MILLIGRAM(S): 2.5 TABLET ORAL at 05:37

## 2020-02-19 RX ADMIN — Medication 40 MILLIGRAM(S): at 06:18

## 2020-02-19 RX ADMIN — ATORVASTATIN CALCIUM 80 MILLIGRAM(S): 80 TABLET, FILM COATED ORAL at 21:10

## 2020-02-19 RX ADMIN — Medication 3 MILLILITER(S): at 00:42

## 2020-02-19 RX ADMIN — Medication 3 MILLILITER(S): at 17:33

## 2020-02-19 RX ADMIN — PANTOPRAZOLE SODIUM 40 MILLIGRAM(S): 20 TABLET, DELAYED RELEASE ORAL at 06:11

## 2020-02-19 RX ADMIN — Medication 3 MILLILITER(S): at 21:10

## 2020-02-19 RX ADMIN — BUDESONIDE AND FORMOTEROL FUMARATE DIHYDRATE 2 PUFF(S): 160; 4.5 AEROSOL RESPIRATORY (INHALATION) at 13:19

## 2020-02-19 RX ADMIN — Medication 30 MILLIGRAM(S): at 17:33

## 2020-02-19 RX ADMIN — RISPERIDONE 3 MILLIGRAM(S): 4 TABLET ORAL at 17:33

## 2020-02-19 RX ADMIN — Medication 40 MILLIGRAM(S): at 17:33

## 2020-02-19 RX ADMIN — Medication 5 MILLIGRAM(S): at 17:33

## 2020-02-19 RX ADMIN — Medication 30 MILLIGRAM(S): at 05:39

## 2020-02-19 NOTE — PHYSICAL THERAPY INITIAL EVALUATION ADULT - GAIT DISTANCE, PT EVAL
one step forward and back and two steps lateral toward HOB. unable to ambulate further 2* telemetry and O2

## 2020-02-20 LAB
GLUCOSE BLDC GLUCOMTR-MCNC: 184 MG/DL — HIGH (ref 70–99)
GLUCOSE BLDC GLUCOMTR-MCNC: 185 MG/DL — HIGH (ref 70–99)
GLUCOSE BLDC GLUCOMTR-MCNC: 211 MG/DL — HIGH (ref 70–99)

## 2020-02-20 PROCEDURE — 99233 SBSQ HOSP IP/OBS HIGH 50: CPT

## 2020-02-20 RX ORDER — SENNA PLUS 8.6 MG/1
2 TABLET ORAL AT BEDTIME
Refills: 0 | Status: DISCONTINUED | OUTPATIENT
Start: 2020-02-20 | End: 2020-02-24

## 2020-02-20 RX ORDER — LACTULOSE 10 G/15ML
15 SOLUTION ORAL
Refills: 0 | Status: DISCONTINUED | OUTPATIENT
Start: 2020-02-20 | End: 2020-02-23

## 2020-02-20 RX ADMIN — Medication 3 MILLILITER(S): at 04:52

## 2020-02-20 RX ADMIN — Medication 40 MILLIGRAM(S): at 05:00

## 2020-02-20 RX ADMIN — CLOPIDOGREL BISULFATE 75 MILLIGRAM(S): 75 TABLET, FILM COATED ORAL at 11:45

## 2020-02-20 RX ADMIN — Medication 50 MILLIGRAM(S): at 05:00

## 2020-02-20 RX ADMIN — ENOXAPARIN SODIUM 40 MILLIGRAM(S): 100 INJECTION SUBCUTANEOUS at 22:33

## 2020-02-20 RX ADMIN — Medication 30 MILLIGRAM(S): at 05:00

## 2020-02-20 RX ADMIN — LISINOPRIL 40 MILLIGRAM(S): 2.5 TABLET ORAL at 05:00

## 2020-02-20 RX ADMIN — Medication 81 MILLIGRAM(S): at 11:46

## 2020-02-20 RX ADMIN — LACTULOSE 15 GRAM(S): 10 SOLUTION ORAL at 12:11

## 2020-02-20 RX ADMIN — SENNA PLUS 2 TABLET(S): 8.6 TABLET ORAL at 14:24

## 2020-02-20 RX ADMIN — ATORVASTATIN CALCIUM 80 MILLIGRAM(S): 80 TABLET, FILM COATED ORAL at 22:33

## 2020-02-20 RX ADMIN — Medication 3 MILLILITER(S): at 17:18

## 2020-02-20 RX ADMIN — PANTOPRAZOLE SODIUM 40 MILLIGRAM(S): 20 TABLET, DELAYED RELEASE ORAL at 05:00

## 2020-02-20 RX ADMIN — Medication 50 MILLIGRAM(S): at 18:11

## 2020-02-20 RX ADMIN — Medication 5 MILLIGRAM(S): at 18:10

## 2020-02-20 RX ADMIN — Medication 5 MILLIGRAM(S): at 05:00

## 2020-02-20 RX ADMIN — Medication 3 MILLILITER(S): at 11:46

## 2020-02-20 RX ADMIN — RISPERIDONE 1 MILLIGRAM(S): 4 TABLET ORAL at 11:45

## 2020-02-20 RX ADMIN — Medication 40 MILLIGRAM(S): at 18:11

## 2020-02-20 RX ADMIN — Medication 3 MILLILITER(S): at 07:56

## 2020-02-20 RX ADMIN — RISPERIDONE 3 MILLIGRAM(S): 4 TABLET ORAL at 18:11

## 2020-02-20 RX ADMIN — Medication 30 MILLIGRAM(S): at 18:11

## 2020-02-20 RX ADMIN — AMLODIPINE BESYLATE 10 MILLIGRAM(S): 2.5 TABLET ORAL at 05:00

## 2020-02-20 RX ADMIN — QUETIAPINE FUMARATE 100 MILLIGRAM(S): 200 TABLET, FILM COATED ORAL at 18:18

## 2020-02-20 RX ADMIN — BUDESONIDE AND FORMOTEROL FUMARATE DIHYDRATE 2 PUFF(S): 160; 4.5 AEROSOL RESPIRATORY (INHALATION) at 07:56

## 2020-02-20 NOTE — PROGRESS NOTE ADULT - ASSESSMENT
Patient is a 54y old Female who presents with a chief complaint of CHF Exacerbation; COPD Exacerbation due to RSV infection.    CHF Exacerbation: HFrEF  - CXR with pulmonary vascular congestion;   -Elevated BNP;   -Continue home Metoprolol; Continue IV Lasix BID  -I and O  -EF in 2016 55%, Echo in the hospital 38%  -Cardiology consult, Might need eval with cath  -No chest pain, trops negative now    COPD Exacerbation RSV positive:  - Started on Symbicort; Duonebs standing Q4H;   Continue Prednisone 40mg daily x5 days;   Contact precautions;   Tylenol PRN for fever    Hx of Multiple CVA  Hx of T2DM  Hx of CKD Stage II  Hx of HTN  Hx of HLD  Hx of Anxiety and Depression    DVT ppx: Lovenox  GI ppx: Protonix  Diet: DASH/Carb consistent  Activity: As tolerated

## 2020-02-20 NOTE — PROGRESS NOTE ADULT - ASSESSMENT
Patient is a 54y old Female who presents with a chief complaint of CHF Exacerbation; COPD Exacerbation due to RSV infection.    Acute HFrEF:   CXR showed bilateral opacities. Pro-BNP elevated.   Echo showed LVEF 38%, mild to mod MR, mild to mod TR, moderate Pulmonary HTN.   Continue Lasix 40mg IV BID, continue Lisinopril and Metoprolol.   Cardiology consult. Diet: sodium 2g, daily weight.     Acute COPD Exacerbation due RSV infection:  Continue  Symbicort; DuoNeb.   Prednisone 40mg daily x5 days.     History of CVA:   Mild left side weakness.   Continue ASA, Plavix and Lipitor.     T2D: FS is controlled.     HTN: Continue Norvasc, Lisinopril and Metoprolol.     Anxiety and Depression      #Progress Note Handoff:  Pending (specify):  Cardiology consult, improving CHF and SOB.  Family discussion:  Disposition: Home once medically stable.

## 2020-02-20 NOTE — PROGRESS NOTE ADULT - SUBJECTIVE AND OBJECTIVE BOX
WILLIAN MARY 54y Female  MRN#: [id number]     SUBJECTIVE  Patient is a 54y old Female who presents with a chief complaint of CHF Exacerbation; COPD Exacerbation due to RSV infection; Elevated Troponin (18 Feb 2020 18:25)  Currently admitted to medicine with the primary diagnosis of CHF exacerbation    OBJECTIVE  PAST MEDICAL & SURGICAL HISTORY  CKD (chronic kidney disease), stage II  Type 2 diabetes mellitus  Cerebrovascular accident (CVA): Multiple  CHF (congestive heart failure)  COPD, severe  Anxiety and depression  Hyperlipidemia  Hypertension  No significant past surgical history    ALLERGIES:  No Known Allergies    MEDICATIONS:  STANDING MEDICATIONS  albuterol/ipratropium for Nebulization 3 milliLiter(s) Nebulizer every 4 hours  amLODIPine   Tablet 10 milliGRAM(s) Oral daily  aspirin enteric coated 81 milliGRAM(s) Oral daily  atorvastatin 80 milliGRAM(s) Oral at bedtime  budesonide 160 MICROgram(s)/formoterol 4.5 MICROgram(s) Inhaler 2 Puff(s) Inhalation two times a day  busPIRone 30 milliGRAM(s) Oral two times a day  chlorhexidine 4% Liquid 1 Application(s) Topical <User Schedule>  clopidogrel Tablet 75 milliGRAM(s) Oral daily  enoxaparin Injectable 40 milliGRAM(s) SubCutaneous at bedtime  furosemide   Injectable 40 milliGRAM(s) IV Push two times a day  lisinopril 40 milliGRAM(s) Oral daily  metoprolol tartrate 50 milliGRAM(s) Oral two times a day  oxybutynin 5 milliGRAM(s) Oral two times a day  pantoprazole    Tablet 40 milliGRAM(s) Oral before breakfast  predniSONE   Tablet 40 milliGRAM(s) Oral daily  QUEtiapine 100 milliGRAM(s) Oral at bedtime  risperiDONE   Tablet 1 milliGRAM(s) Oral daily  risperiDONE   Tablet 3 milliGRAM(s) Oral <User Schedule>  senna 2 Tablet(s) Oral at bedtime    PRN MEDICATIONS  acetaminophen   Tablet .. 650 milliGRAM(s) Oral every 6 hours PRN  lactulose Syrup 15 Gram(s) Oral two times a day PRN      VITAL SIGNS: Last 24 Hours  T(C): 36.6 (20 Feb 2020 08:29), Max: 36.6 (20 Feb 2020 08:29)  T(F): 97.8 (20 Feb 2020 08:29), Max: 97.8 (20 Feb 2020 08:29)  HR: 80 (20 Feb 2020 08:29) (73 - 80)  BP: 138/80 (20 Feb 2020 08:29) (112/71 - 138/80)  BP(mean): --  RR: 20 (20 Feb 2020 08:29) (18 - 20)  SpO2: 97% (20 Feb 2020 08:29) (97% - 98%)    LABS:                        12.0   5.00  )-----------( 183      ( 19 Feb 2020 05:39 )             37.4     02-19    142  |  101  |  14  ----------------------------<  188<H>  4.2   |  26  |  0.8    Ca    9.2      19 Feb 2020 05:39  Phos  5.3     02-19  Mg     1.8     02-19    TPro  6.6  /  Alb  3.7  /  TBili  0.4  /  DBili  x   /  AST  19  /  ALT  18  /  AlkPhos  60  02-19    Culture - Blood (collected 18 Feb 2020 14:50)  Source: .Blood Blood-Peripheral  Preliminary Report (19 Feb 2020 23:01):    No growth to date.    Culture - Blood (collected 18 Feb 2020 14:50)  Source: .Blood Blood-Peripheral  Preliminary Report (19 Feb 2020 23:01):    No growth to date.      CARDIAC MARKERS ( 19 Feb 2020 05:39 )  x     / <0.01 ng/mL / x     / x     / x      CARDIAC MARKERS ( 18 Feb 2020 20:42 )  x     / 0.01 ng/mL / 124 U/L / x     / 1.8 ng/mL    PHYSICAL EXAM:  	GENERAL:   Awake, alert; NAD.  	HEENT:  Head NC/AT; Conjunctivae pink, Sclera anicteric; Oral mucosa moist.  	CARDIO:   Regular rate; Regular rhythm; S1 & S2; No murmur heard but unsure given extensive wheezing/rhonchi.  	RESP:   Diffuse BL Wheeze/Rhonchi.  	GI:   Soft; NT/ND; BS; No guarding; No rebound tenderness; Obese.  EXT:   No edema in LE.

## 2020-02-20 NOTE — PROGRESS NOTE ADULT - SUBJECTIVE AND OBJECTIVE BOX
WILLIAN MARY  54y  Female      Patient is a 54y old  Female who presents with a chief complaint of CHF Exacerbation; COPD Exacerbation due to RSV infection; Elevated Troponin (20 Feb 2020 15:51)      INTERVAL HPI/OVERNIGHT EVENTS:  She is still with SOB and cough, no fever.   Vital Signs Last 24 Hrs  T(C): 36.7 (20 Feb 2020 16:10), Max: 36.7 (20 Feb 2020 16:10)  T(F): 98 (20 Feb 2020 16:10), Max: 98 (20 Feb 2020 16:10)  HR: 73 (20 Feb 2020 16:10) (73 - 80)  BP: 105/58 (20 Feb 2020 16:10) (105/58 - 138/80)  BP(mean): --  RR: 18 (20 Feb 2020 16:10) (18 - 20)  SpO2: 98% (20 Feb 2020 16:10) (97% - 98%)      02-19-20 @ 07:01  -  02-20-20 @ 07:00  --------------------------------------------------------  IN: 0 mL / OUT: 700 mL / NET: -700 mL    02-20-20 @ 07:01  -  02-20-20 @ 16:20  --------------------------------------------------------  IN: 480 mL / OUT: 300 mL / NET: 180 mL            Consultant(s) Notes Reviewed:  [x ] YES  [ ] NO          MEDICATIONS  (STANDING):  albuterol/ipratropium for Nebulization 3 milliLiter(s) Nebulizer every 4 hours  amLODIPine   Tablet 10 milliGRAM(s) Oral daily  aspirin enteric coated 81 milliGRAM(s) Oral daily  atorvastatin 80 milliGRAM(s) Oral at bedtime  budesonide 160 MICROgram(s)/formoterol 4.5 MICROgram(s) Inhaler 2 Puff(s) Inhalation two times a day  busPIRone 30 milliGRAM(s) Oral two times a day  chlorhexidine 4% Liquid 1 Application(s) Topical <User Schedule>  clopidogrel Tablet 75 milliGRAM(s) Oral daily  enoxaparin Injectable 40 milliGRAM(s) SubCutaneous at bedtime  furosemide   Injectable 40 milliGRAM(s) IV Push two times a day  lisinopril 40 milliGRAM(s) Oral daily  metoprolol tartrate 50 milliGRAM(s) Oral two times a day  oxybutynin 5 milliGRAM(s) Oral two times a day  pantoprazole    Tablet 40 milliGRAM(s) Oral before breakfast  predniSONE   Tablet 40 milliGRAM(s) Oral daily  QUEtiapine 100 milliGRAM(s) Oral at bedtime  risperiDONE   Tablet 1 milliGRAM(s) Oral daily  risperiDONE   Tablet 3 milliGRAM(s) Oral <User Schedule>  senna 2 Tablet(s) Oral at bedtime    MEDICATIONS  (PRN):  acetaminophen   Tablet .. 650 milliGRAM(s) Oral every 6 hours PRN Temp greater or equal to 38C (100.4F)  lactulose Syrup 15 Gram(s) Oral two times a day PRN Constipation      LABS                          12.0   5.00  )-----------( 183      ( 19 Feb 2020 05:39 )             37.4     02-19    142  |  101  |  14  ----------------------------<  188<H>  4.2   |  26  |  0.8    Ca    9.2      19 Feb 2020 05:39  Phos  5.3     02-19  Mg     1.8     02-19    TPro  6.6  /  Alb  3.7  /  TBili  0.4  /  DBili  x   /  AST  19  /  ALT  18  /  AlkPhos  60  02-19          Lactate Trend    CARDIAC MARKERS ( 19 Feb 2020 05:39 )  x     / <0.01 ng/mL / x     / x     / x      CARDIAC MARKERS ( 18 Feb 2020 20:42 )  x     / 0.01 ng/mL / 124 U/L / x     / 1.8 ng/mL      CAPILLARY BLOOD GLUCOSE      POCT Blood Glucose.: 184 mg/dL (20 Feb 2020 08:23)      Culture - Blood (collected 02-18-20 @ 14:50)  Source: .Blood Blood-Peripheral  Preliminary Report (02-19-20 @ 23:01):    No growth to date.    Culture - Blood (collected 02-18-20 @ 14:50)  Source: .Blood Blood-Peripheral  Preliminary Report (02-19-20 @ 23:01):    No growth to date.        RADIOLOGY & ADDITIONAL TESTS:    Imaging Personally Reviewed:  [ ] YES  [ ] NO    HEALTH ISSUES - PROBLEM Dx:        PHYSICAL EXAM:  GENERAL: NAD, well-developed  HEAD:  Atraumatic, Normocephalic  EYES: EOMI, PERRLA, conjunctiva and sclera clear  NECK: Supple, No JVD  CHEST/LUNG: bilateral rales.   HEART: Regular rate and rhythm; S1 S2  ABDOMEN: Soft, Nontender, Nondistended; Bowel sounds present  EXTREMITIES:  2+ Peripheral Pulses, trace edema.   PSYCH: AAOx3  NEUROLOGY: non-focal  SKIN: No rashes or lesions

## 2020-02-21 ENCOUNTER — TRANSCRIPTION ENCOUNTER (OUTPATIENT)
Age: 55
End: 2020-02-21

## 2020-02-21 LAB
ALBUMIN SERPL ELPH-MCNC: 3.9 G/DL — SIGNIFICANT CHANGE UP (ref 3.5–5.2)
ALP SERPL-CCNC: 66 U/L — SIGNIFICANT CHANGE UP (ref 30–115)
ALT FLD-CCNC: 21 U/L — SIGNIFICANT CHANGE UP (ref 0–41)
ANION GAP SERPL CALC-SCNC: 14 MMOL/L — SIGNIFICANT CHANGE UP (ref 7–14)
AST SERPL-CCNC: 16 U/L — SIGNIFICANT CHANGE UP (ref 0–41)
BILIRUB SERPL-MCNC: 0.3 MG/DL — SIGNIFICANT CHANGE UP (ref 0.2–1.2)
BUN SERPL-MCNC: 27 MG/DL — HIGH (ref 10–20)
CALCIUM SERPL-MCNC: 9.5 MG/DL — SIGNIFICANT CHANGE UP (ref 8.5–10.1)
CHLORIDE SERPL-SCNC: 99 MMOL/L — SIGNIFICANT CHANGE UP (ref 98–110)
CO2 SERPL-SCNC: 28 MMOL/L — SIGNIFICANT CHANGE UP (ref 17–32)
CREAT SERPL-MCNC: 1 MG/DL — SIGNIFICANT CHANGE UP (ref 0.7–1.5)
ESTIMATED AVERAGE GLUCOSE: 143 MG/DL — HIGH (ref 68–114)
GLUCOSE BLDC GLUCOMTR-MCNC: 167 MG/DL — HIGH (ref 70–99)
GLUCOSE BLDC GLUCOMTR-MCNC: 169 MG/DL — HIGH (ref 70–99)
GLUCOSE BLDC GLUCOMTR-MCNC: 173 MG/DL — HIGH (ref 70–99)
GLUCOSE BLDC GLUCOMTR-MCNC: 208 MG/DL — HIGH (ref 70–99)
GLUCOSE BLDC GLUCOMTR-MCNC: 251 MG/DL — HIGH (ref 70–99)
GLUCOSE SERPL-MCNC: 195 MG/DL — HIGH (ref 70–99)
HBA1C BLD-MCNC: 6.6 % — HIGH (ref 4–5.6)
HCT VFR BLD CALC: 40.3 % — SIGNIFICANT CHANGE UP (ref 37–47)
HGB BLD-MCNC: 12.9 G/DL — SIGNIFICANT CHANGE UP (ref 12–16)
MCHC RBC-ENTMCNC: 28.6 PG — SIGNIFICANT CHANGE UP (ref 27–31)
MCHC RBC-ENTMCNC: 32 G/DL — SIGNIFICANT CHANGE UP (ref 32–37)
MCV RBC AUTO: 89.4 FL — SIGNIFICANT CHANGE UP (ref 81–99)
NRBC # BLD: 0 /100 WBCS — SIGNIFICANT CHANGE UP (ref 0–0)
PLATELET # BLD AUTO: 240 K/UL — SIGNIFICANT CHANGE UP (ref 130–400)
POTASSIUM SERPL-MCNC: 4.2 MMOL/L — SIGNIFICANT CHANGE UP (ref 3.5–5)
POTASSIUM SERPL-SCNC: 4.2 MMOL/L — SIGNIFICANT CHANGE UP (ref 3.5–5)
PROT SERPL-MCNC: 7 G/DL — SIGNIFICANT CHANGE UP (ref 6–8)
RBC # BLD: 4.51 M/UL — SIGNIFICANT CHANGE UP (ref 4.2–5.4)
RBC # FLD: 15.9 % — HIGH (ref 11.5–14.5)
SODIUM SERPL-SCNC: 141 MMOL/L — SIGNIFICANT CHANGE UP (ref 135–146)
WBC # BLD: 7.93 K/UL — SIGNIFICANT CHANGE UP (ref 4.8–10.8)
WBC # FLD AUTO: 7.93 K/UL — SIGNIFICANT CHANGE UP (ref 4.8–10.8)

## 2020-02-21 PROCEDURE — 99233 SBSQ HOSP IP/OBS HIGH 50: CPT

## 2020-02-21 RX ORDER — DEXTROSE 50 % IN WATER 50 %
15 SYRINGE (ML) INTRAVENOUS ONCE
Refills: 0 | Status: DISCONTINUED | OUTPATIENT
Start: 2020-02-21 | End: 2020-02-24

## 2020-02-21 RX ORDER — FUROSEMIDE 40 MG
40 TABLET ORAL
Refills: 0 | Status: DISCONTINUED | OUTPATIENT
Start: 2020-02-22 | End: 2020-02-24

## 2020-02-21 RX ORDER — INSULIN LISPRO 100/ML
3 VIAL (ML) SUBCUTANEOUS
Refills: 0 | Status: DISCONTINUED | OUTPATIENT
Start: 2020-02-21 | End: 2020-02-22

## 2020-02-21 RX ORDER — DEXTROSE 50 % IN WATER 50 %
12.5 SYRINGE (ML) INTRAVENOUS ONCE
Refills: 0 | Status: DISCONTINUED | OUTPATIENT
Start: 2020-02-21 | End: 2020-02-24

## 2020-02-21 RX ORDER — GLUCAGON INJECTION, SOLUTION 0.5 MG/.1ML
1 INJECTION, SOLUTION SUBCUTANEOUS ONCE
Refills: 0 | Status: DISCONTINUED | OUTPATIENT
Start: 2020-02-21 | End: 2020-02-24

## 2020-02-21 RX ORDER — INSULIN LISPRO 100/ML
VIAL (ML) SUBCUTANEOUS
Refills: 0 | Status: DISCONTINUED | OUTPATIENT
Start: 2020-02-21 | End: 2020-02-24

## 2020-02-21 RX ORDER — FUROSEMIDE 40 MG
40 TABLET ORAL ONCE
Refills: 0 | Status: COMPLETED | OUTPATIENT
Start: 2020-02-21 | End: 2020-02-21

## 2020-02-21 RX ORDER — SODIUM CHLORIDE 9 MG/ML
1000 INJECTION, SOLUTION INTRAVENOUS
Refills: 0 | Status: DISCONTINUED | OUTPATIENT
Start: 2020-02-21 | End: 2020-02-24

## 2020-02-21 RX ORDER — DEXTROSE 50 % IN WATER 50 %
25 SYRINGE (ML) INTRAVENOUS ONCE
Refills: 0 | Status: DISCONTINUED | OUTPATIENT
Start: 2020-02-21 | End: 2020-02-24

## 2020-02-21 RX ORDER — INSULIN GLARGINE 100 [IU]/ML
7 INJECTION, SOLUTION SUBCUTANEOUS AT BEDTIME
Refills: 0 | Status: DISCONTINUED | OUTPATIENT
Start: 2020-02-21 | End: 2020-02-24

## 2020-02-21 RX ADMIN — CLOPIDOGREL BISULFATE 75 MILLIGRAM(S): 75 TABLET, FILM COATED ORAL at 11:38

## 2020-02-21 RX ADMIN — AMLODIPINE BESYLATE 10 MILLIGRAM(S): 2.5 TABLET ORAL at 05:26

## 2020-02-21 RX ADMIN — BUDESONIDE AND FORMOTEROL FUMARATE DIHYDRATE 2 PUFF(S): 160; 4.5 AEROSOL RESPIRATORY (INHALATION) at 07:34

## 2020-02-21 RX ADMIN — Medication 40 MILLIGRAM(S): at 21:00

## 2020-02-21 RX ADMIN — RISPERIDONE 3 MILLIGRAM(S): 4 TABLET ORAL at 18:08

## 2020-02-21 RX ADMIN — Medication 5 MILLIGRAM(S): at 18:05

## 2020-02-21 RX ADMIN — LACTULOSE 15 GRAM(S): 10 SOLUTION ORAL at 05:25

## 2020-02-21 RX ADMIN — Medication 5 MILLIGRAM(S): at 05:26

## 2020-02-21 RX ADMIN — Medication 40 MILLIGRAM(S): at 05:26

## 2020-02-21 RX ADMIN — RISPERIDONE 1 MILLIGRAM(S): 4 TABLET ORAL at 11:48

## 2020-02-21 RX ADMIN — Medication 30 MILLIGRAM(S): at 18:07

## 2020-02-21 RX ADMIN — Medication 50 MILLIGRAM(S): at 05:26

## 2020-02-21 RX ADMIN — BUDESONIDE AND FORMOTEROL FUMARATE DIHYDRATE 2 PUFF(S): 160; 4.5 AEROSOL RESPIRATORY (INHALATION) at 21:01

## 2020-02-21 RX ADMIN — Medication 3: at 11:48

## 2020-02-21 RX ADMIN — SENNA PLUS 2 TABLET(S): 8.6 TABLET ORAL at 21:01

## 2020-02-21 RX ADMIN — Medication 50 MILLIGRAM(S): at 18:07

## 2020-02-21 RX ADMIN — INSULIN GLARGINE 7 UNIT(S): 100 INJECTION, SOLUTION SUBCUTANEOUS at 21:16

## 2020-02-21 RX ADMIN — Medication 3 UNIT(S): at 11:48

## 2020-02-21 RX ADMIN — LISINOPRIL 40 MILLIGRAM(S): 2.5 TABLET ORAL at 05:27

## 2020-02-21 RX ADMIN — PANTOPRAZOLE SODIUM 40 MILLIGRAM(S): 20 TABLET, DELAYED RELEASE ORAL at 06:32

## 2020-02-21 RX ADMIN — Medication 81 MILLIGRAM(S): at 11:38

## 2020-02-21 RX ADMIN — Medication 30 MILLIGRAM(S): at 05:26

## 2020-02-21 RX ADMIN — ATORVASTATIN CALCIUM 80 MILLIGRAM(S): 80 TABLET, FILM COATED ORAL at 21:01

## 2020-02-21 RX ADMIN — QUETIAPINE FUMARATE 100 MILLIGRAM(S): 200 TABLET, FILM COATED ORAL at 21:01

## 2020-02-21 RX ADMIN — ENOXAPARIN SODIUM 40 MILLIGRAM(S): 100 INJECTION SUBCUTANEOUS at 21:02

## 2020-02-21 RX ADMIN — Medication 3 UNIT(S): at 18:04

## 2020-02-21 RX ADMIN — Medication 3 MILLILITER(S): at 04:35

## 2020-02-21 RX ADMIN — Medication 2: at 18:04

## 2020-02-21 RX ADMIN — CHLORHEXIDINE GLUCONATE 1 APPLICATION(S): 213 SOLUTION TOPICAL at 05:27

## 2020-02-21 NOTE — DISCHARGE NOTE PROVIDER - HOSPITAL COURSE
54 year old female with a history of multiple CVAs, HFrEF (new diagnosis, ef=38%), DMII, HTN. Patient is presenting for shortness of breath and fever. She was found to have RSV positive. Admitted for COPD and CHF exaccerbation secondary to RSV# HFrEF, acute CHF exaccerbation precipitated by viral infection.         She had a newly established HFrEF (previous ef=55% in 2016, now 38%) with signs of volume overload on admission. She was started on Lasix 40mg IV BID and then switched to oral lasix.         She also had a  COPD Exacerbation secondary to RSV infection. She was continued on her usual home oxygen 4L and a short course of prednisone 54 year old female with a history of multiple CVAs, HFrEF ( ECHO 2018: EF 45% and now with EF=38%), DMII, HTN. Patient is presenting for shortness of breath and fever. She was found to have RSV positive. Admitted for COPD and CHF exaccerbation secondary to RSV# HFrEF, acute CHF exaccerbation precipitated by viral infection.         She had acute over chronic HFrEF with signs of volume overload on admission. She was started on Lasix 40mg IV BID and then switched to oral lasix.         She also had a  COPD Exacerbation secondary to RSV infection. She was continued on her usual home oxygen 4L and a short course of prednisone     Smoking cessation stressed.    She will f/u with PMD (case discussed with Dr. Charles prior to discharge) and cardiology.

## 2020-02-21 NOTE — PROGRESS NOTE ADULT - SUBJECTIVE AND OBJECTIVE BOX
Patient is a 54y old  Female who presents with a chief complaint of SOB    Patient was seen and examined.  Sob improving, c/o cough  Denies any other complaints.  All systems reviewed positive history as mentioned above.    PAST MEDICAL & SURGICAL HISTORY:  CKD (chronic kidney disease), stage II  Type 2 diabetes mellitus  Cerebrovascular accident (CVA): Multiple  CHF (congestive heart failure)  COPD, severe  Anxiety and depression  Hyperlipidemia  Hypertension  No significant past surgical history    Allergies  No Known Allergies    MEDICATIONS  (STANDING):  albuterol/ipratropium for Nebulization 3 milliLiter(s) Nebulizer every 4 hours  amLODIPine   Tablet 10 milliGRAM(s) Oral daily  aspirin enteric coated 81 milliGRAM(s) Oral daily  atorvastatin 80 milliGRAM(s) Oral at bedtime  budesonide 160 MICROgram(s)/formoterol 4.5 MICROgram(s) Inhaler 2 Puff(s) Inhalation two times a day  busPIRone 30 milliGRAM(s) Oral two times a day  chlorhexidine 4% Liquid 1 Application(s) Topical <User Schedule>  clopidogrel Tablet 75 milliGRAM(s) Oral daily  enoxaparin Injectable 40 milliGRAM(s) SubCutaneous at bedtime  furosemide    Tablet 40 milliGRAM(s) Oral once  insulin glargine Injectable (LANTUS) 7 Unit(s) SubCutaneous at bedtime  insulin lispro (HumaLOG) corrective regimen sliding scale   SubCutaneous three times a day before meals  insulin lispro Injectable (HumaLOG) 3 Unit(s) SubCutaneous three times a day before meals  lisinopril 40 milliGRAM(s) Oral daily  metoprolol tartrate 50 milliGRAM(s) Oral two times a day  oxybutynin 5 milliGRAM(s) Oral two times a day  pantoprazole    Tablet 40 milliGRAM(s) Oral before breakfast  predniSONE   Tablet 40 milliGRAM(s) Oral daily  QUEtiapine 100 milliGRAM(s) Oral at bedtime  risperiDONE   Tablet 1 milliGRAM(s) Oral daily  risperiDONE   Tablet 3 milliGRAM(s) Oral <User Schedule>  senna 2 Tablet(s) Oral at bedtime    MEDICATIONS  (PRN):  acetaminophen   Tablet .. 650 milliGRAM(s) Oral every 6 hours PRN Temp greater or equal to 38C (100.4F)  dextrose 40% Gel 15 Gram(s) Oral once PRN Blood Glucose LESS THAN 70 milliGRAM(s)/deciliter  glucagon  Injectable 1 milliGRAM(s) IntraMuscular once PRN Glucose LESS THAN 70 milligrams/deciliter  lactulose Syrup 15 Gram(s) Oral two times a day PRN Constipation    Vital Signs Last 24 Hrs  T(C): 36  T(F): 96.8  HR: 72  BP: 137/88  BP(mean): --  RR: 18  SpO2: 97%    O/E:  Awake, alert, not in distress.  HEENT: atraumatic, EOMI.  Chest: basal rales+  CVS: SIS2 +, systolic murmur+  P/A: Soft, BS+  CNS: non focal.  Ext: no edema feet.  Skin: no rash, no ulcers.  All systems reviewed positive findings as above.    POCT Blood Glucose.: 251 mg/dL (21 Feb 2020 11:21)  POCT Blood Glucose.: 167 mg/dL (21 Feb 2020 07:35)  POCT Blood Glucose.: 185 mg/dL (20 Feb 2020 21:47)  POCT Blood Glucose.: 211 mg/dL (20 Feb 2020 17:13)                          12.9   7.93  )-----------( 240      ( 21 Feb 2020 08:25 )             40.3     02-21    141  |  99  |  27<H>  ----------------------------<  195<H>  4.2   |  28  |  1.0    Ca    9.5      21 Feb 2020 08:25    TPro  7.0  /  Alb  3.9  /  TBili  0.3  /  DBili  x   /  AST  16  /  ALT  21  /  AlkPhos  66  02-21                Culture - Blood (collected 18 Feb 2020 14:50)  Source: .Blood Blood-Peripheral  Preliminary Report (19 Feb 2020 23:01):    No growth to date.    Culture - Blood (collected 18 Feb 2020 14:50)  Source: .Blood Blood-Peripheral  Preliminary Report (19 Feb 2020 23:01):    No growth to date.

## 2020-02-21 NOTE — CONSULT NOTE ADULT - SUBJECTIVE AND OBJECTIVE BOX
HPI:  PMHx:  CVA (multiple); CHF; COPD on home O2; T2DM; HTN; HLD; Anxiety/Depression    This is a 54 year old female who presented with a cc/o dyspnea. She was not feeling well and was finding it increasingly more difficult to breath since the morning of presentation. She also complained of subjective fever, sinus pressure, productive cough (green sputum), and wheezing, which prompted her to come to the ED for further evaluation. In the ED, she was hemodynamically stable, but with a fever (100.7F). CXR showed pulmonary vascular congestion. RSV positive. (18 Feb 2020 18:25)      PAST MEDICAL & SURGICAL HISTORY:  CKD (chronic kidney disease), stage II  Type 2 diabetes mellitus  Cerebrovascular accident (CVA): Multiple  CHF (congestive heart failure)  COPD, severe  Anxiety and depression  Hyperlipidemia  Hypertension  No significant past surgical history      Hospital Course:    TODAY'S SUBJECTIVE & REVIEW OF SYMPTOMS:     Constitutional WNL   Cardio WNL   Resp WNL   GI WNL  Heme WNL  Endo WNL  Skin WNL  MSK Weakness  Neuro WNL  Cognitive WNL  Psych WNL      MEDICATIONS  (STANDING):  albuterol/ipratropium for Nebulization 3 milliLiter(s) Nebulizer every 4 hours  amLODIPine   Tablet 10 milliGRAM(s) Oral daily  aspirin enteric coated 81 milliGRAM(s) Oral daily  atorvastatin 80 milliGRAM(s) Oral at bedtime  budesonide 160 MICROgram(s)/formoterol 4.5 MICROgram(s) Inhaler 2 Puff(s) Inhalation two times a day  busPIRone 30 milliGRAM(s) Oral two times a day  chlorhexidine 4% Liquid 1 Application(s) Topical <User Schedule>  clopidogrel Tablet 75 milliGRAM(s) Oral daily  dextrose 5%. 1000 milliLiter(s) (50 mL/Hr) IV Continuous <Continuous>  dextrose 50% Injectable 12.5 Gram(s) IV Push once  dextrose 50% Injectable 25 Gram(s) IV Push once  dextrose 50% Injectable 25 Gram(s) IV Push once  enoxaparin Injectable 40 milliGRAM(s) SubCutaneous at bedtime  furosemide    Tablet 40 milliGRAM(s) Oral once  insulin glargine Injectable (LANTUS) 7 Unit(s) SubCutaneous at bedtime  insulin lispro (HumaLOG) corrective regimen sliding scale   SubCutaneous three times a day before meals  insulin lispro Injectable (HumaLOG) 3 Unit(s) SubCutaneous three times a day before meals  lisinopril 40 milliGRAM(s) Oral daily  metoprolol tartrate 50 milliGRAM(s) Oral two times a day  oxybutynin 5 milliGRAM(s) Oral two times a day  pantoprazole    Tablet 40 milliGRAM(s) Oral before breakfast  predniSONE   Tablet 40 milliGRAM(s) Oral daily  QUEtiapine 100 milliGRAM(s) Oral at bedtime  risperiDONE   Tablet 1 milliGRAM(s) Oral daily  risperiDONE   Tablet 3 milliGRAM(s) Oral <User Schedule>  senna 2 Tablet(s) Oral at bedtime    MEDICATIONS  (PRN):  acetaminophen   Tablet .. 650 milliGRAM(s) Oral every 6 hours PRN Temp greater or equal to 38C (100.4F)  dextrose 40% Gel 15 Gram(s) Oral once PRN Blood Glucose LESS THAN 70 milliGRAM(s)/deciliter  glucagon  Injectable 1 milliGRAM(s) IntraMuscular once PRN Glucose LESS THAN 70 milligrams/deciliter  lactulose Syrup 15 Gram(s) Oral two times a day PRN Constipation      FAMILY HISTORY:  No pertinent family history in first degree relatives      Allergies    No Known Allergies    Intolerances        SOCIAL HISTORY:    [  ] Etoh  [  ] Smoking  [  ] Substance abuse     Home Environment:  [x  ] Home Alone  [  ] Lives with Family  [x  ] Home Health Aid    Dwelling:  [  ] Apartment  [x  ] Private House  [  ] Adult Home  [  ] Skilled Nursing Facility      [  ] Short Term  [  ] Long Term  [ x ] Stairs       Elevator [  ]    FUNCTIONAL STATUS PTA: (Check all that apply)  Ambulation: [x   ]Independent    [  ] Dependent     [  ] Non-Ambulatory  Assistive Device: [  ] SA Cane  [  ]  Q Cane  [x  ] Walker  [ x ]  Wheelchair  ADL : [  ] Independent  [  x]  Dependent       Vital Signs Last 24 Hrs  T(C): 36.4 (21 Feb 2020 14:15), Max: 36.4 (21 Feb 2020 14:15)  T(F): 97.6 (21 Feb 2020 14:15), Max: 97.6 (21 Feb 2020 14:15)  HR: 70 (21 Feb 2020 14:15) (69 - 72)  BP: 122/78 (21 Feb 2020 14:15) (121/70 - 137/88)  BP(mean): 94 (21 Feb 2020 14:15) (94 - 94)  RR: 18 (21 Feb 2020 14:15) (18 - 18)  SpO2: 96% (21 Feb 2020 14:15) (96% - 97%)      PHYSICAL EXAM: Alert & awake  GENERAL: NAD  HEAD:  Atraumatic, Normocephalic  CHEST/LUNG: decreased bs lung bases  HEART: S1S2+  ABDOMEN: Soft, Nontender  EXTREMITIES:  no calf tenderness    NERVOUS SYSTEM:  Cranial Nerves 2-12 intact [  ] Abnormal  [  ]  ROM: WFL all extremities [  ]  Abnormal [x  ]limited left side  Motor Strength: WFL all extremities  [  ]  Abnormal [ x ]limited left side  Sensation: intact to light touch [  ] Abnormal [  ]  Reflexes: Symmetric [  ]  Abnormal [  ]    FUNCTIONAL STATUS:  Bed Mobility: Independent [  ]  Supervision [  ]  Needs Assistance [x  ]  N/A [  ]  Transfers: Independent [  ]  Supervision [  ]  Needs Assistance [ x ]  N/A [  ]   Ambulation: Independent [  ]  Supervision [  ]  Needs Assistance [  ]  N/A [  ]  ADL: Independent [  ] Requires Assistance [  ] N/A [  ]      LABS:                        12.9   7.93  )-----------( 240      ( 21 Feb 2020 08:25 )             40.3     02-21    141  |  99  |  27<H>  ----------------------------<  195<H>  4.2   |  28  |  1.0    Ca    9.5      21 Feb 2020 08:25    TPro  7.0  /  Alb  3.9  /  TBili  0.3  /  DBili  x   /  AST  16  /  ALT  21  /  AlkPhos  66  02-21          RADIOLOGY & ADDITIONAL STUDIES:    Assesment:

## 2020-02-21 NOTE — CONSULT NOTE ADULT - ASSESSMENT
IMPRESSION: Rehab of gait dysfunction / old cva    PRECAUTIONS: [  ] Cardiac  [  ] Respiratory  [  ] Seizures [  ] Contact Isolation  [  ] Droplet Isolation  [  ] Other    Weight Bearing Status:     RECOMMENDATION:    Out of Bed to Chair     DVT/Decubiti Prophylaxis    REHAB PLAN:     [ x  ] Bedside P/T 3-5 times a week   [   ]   Bedside O/T  2-3 times a week             [   ] No Rehab Therapy Indicated                   [   ]  Speech Therapy   Conditioning/ROM                                    ADL  Bed Mobility                                               Conditioning/ROM  Transfers                                                     Bed Mobility  Sitting /Standing Balance                         Transfers                                        Gait Training                                               Sitting/Standing Balance  Stair Training [   ]Applicable                    Home equipment Eval                                                                        Splinting  [   ] Only      GOALS:   ADL   [   ]   Independent                    Transfers  [ x  ] Independent                          Ambulation  [x   ] Independent     [ x   ] With device                            [   ]  CG                                                         [   ]  CG                                                                  [   ] CG                            [    ] Min A                                                   [   ] Min A                                                              [   ] Min  A          DISCHARGE PLAN:   [   ]  Good candidate for Intensive Rehabilitation/Hospital based                                             Will tolerate 3hrs Intensive Rehab Daily                                       [ x   ]  Short Term Rehab in Skilled Nursing Facility                             vs          [ x   ]  Home with Outpatient or VN services                                         [    ]  Possible Candidate for Intensive Hospital based Rehab

## 2020-02-21 NOTE — PROGRESS NOTE ADULT - SUBJECTIVE AND OBJECTIVE BOX
WILLIAN MARY 54y Female  MRN#: [id number]     SUBJECTIVE  Patient is a 54y old Female who presents with a chief complaint of CHF Exacerbation; COPD Exacerbation due to RSV infection; Elevated Troponin (2020 16:17)  Currently admitted to medicine with the primary diagnosis of CHF exacerbation  Today is hospital day 3d, and this morning she says that her breathing is a lot better.     OBJECTIVE  PAST MEDICAL & SURGICAL HISTORY  CKD (chronic kidney disease), stage II  Type 2 diabetes mellitus  Cerebrovascular accident (CVA): Multiple  CHF (congestive heart failure)  COPD, severe  Anxiety and depression  Hyperlipidemia  Hypertension  No significant past surgical history    ALLERGIES:  No Known Allergies    MEDICATIONS:  STANDING MEDICATIONS  albuterol/ipratropium for Nebulization 3 milliLiter(s) Nebulizer every 4 hours  amLODIPine   Tablet 10 milliGRAM(s) Oral daily  aspirin enteric coated 81 milliGRAM(s) Oral daily  atorvastatin 80 milliGRAM(s) Oral at bedtime  budesonide 160 MICROgram(s)/formoterol 4.5 MICROgram(s) Inhaler 2 Puff(s) Inhalation two times a day  busPIRone 30 milliGRAM(s) Oral two times a day  chlorhexidine 4% Liquid 1 Application(s) Topical <User Schedule>  clopidogrel Tablet 75 milliGRAM(s) Oral daily  enoxaparin Injectable 40 milliGRAM(s) SubCutaneous at bedtime  furosemide    Tablet 40 milliGRAM(s) Oral once  lisinopril 40 milliGRAM(s) Oral daily  metoprolol tartrate 50 milliGRAM(s) Oral two times a day  oxybutynin 5 milliGRAM(s) Oral two times a day  pantoprazole    Tablet 40 milliGRAM(s) Oral before breakfast  predniSONE   Tablet 40 milliGRAM(s) Oral daily  QUEtiapine 100 milliGRAM(s) Oral at bedtime  risperiDONE   Tablet 1 milliGRAM(s) Oral daily  risperiDONE   Tablet 3 milliGRAM(s) Oral <User Schedule>  senna 2 Tablet(s) Oral at bedtime    PRN MEDICATIONS  acetaminophen   Tablet .. 650 milliGRAM(s) Oral every 6 hours PRN  lactulose Syrup 15 Gram(s) Oral two times a day PRN      VITAL SIGNS: Last 24 Hours  T(C): 36 (2020 06:37), Max: 36.7 (2020 16:10)  T(F): 96.8 (2020 06:37), Max: 98 (2020 16:10)  HR: 72 (2020 06:37) (69 - 73)  BP: 137/88 (2020 06:37) (105/58 - 137/88)  BP(mean): --  RR: 18 (2020 06:37) (18 - 18)  SpO2: 97% (2020 22:11) (97% - 98%)    LABS:                        12.9   7.93  )-----------( 240      ( 2020 08:25 )             40.3     02-    141  |  99  |  27<H>  ----------------------------<  195<H>  4.2   |  28  |  1.0    Ca    9.5      2020 08:25    TPro  7.0  /  Alb  3.9  /  TBili  0.3  /  DBili  x   /  AST  16  /  ALT  21  /  AlkPhos  66  02-21    Culture - Blood (collected 2020 14:50)  Source: .Blood Blood-Peripheral  Preliminary Report (2020 23:01):    No growth to date.    Culture - Blood (collected 2020 14:50)  Source: .Blood Blood-Peripheral  Preliminary Report (2020 23:01):    No growth to date.    PHYSICAL EXAM:    GENERAL: NAD, well-developed, AAOx3  HEENT:  Atraumatic, Normocephalic. EOMI, PERRLA, conjunctiva and sclera clear, No JVD  PULMONARY: mild expiratory wheezing, mild bibasilar crackles.   CARDIOVASCULAR: Regular rate and rhythm; No murmurs, rubs, or gallops  GASTROINTESTINAL: Soft, Nontender, Nondistended; Bowel sounds present  MUSCULOSKELETAL:  2+ Peripheral Pulses, No clubbing, cyanosis, or edema  NEUROLOGY: non-focal  SKIN: No rashes or lesions    ASSESSMENT & PLAN    54 year old female with a history of multiple CVAs, HFrEF (new diagnosis, ef=38%), DMII, HTN. Patient is presenting for shortness of breath and fever. She was found to have RSV positive. Admitted for COPD and CHF exaccerbation secondary to RSV    # HFrEF, acute CHF exaccerbation precipitated by viral infection  - Newly established HFrEF (previous ef=55% in 2016, now 38%) with signs of volume overload on admission.   - on Lasix 40mg IV BID. Will switch to oral 40mg BID. Home dose =40mg once daily  - I and O, today net balance =180  - on ACE-I and BB    # COPD Exacerbation secondary to RSV infection:  - on home oxygen 4L. Continue 4L o2 here  - Started on Symbicort; Duonebs standing Q4H;  - prednisone 40mg daily for 5 days    #Hx of Multiple CVA (8x) with mild left sided weakness  - on aspirin and plavix and statin     #Hx of DMII,   - Fu HbA1c  - will start on lantus and lispro as FS>180 and she is on steroids    #Possible CKD, GFR=85%  - stable Cr    # Hx of HTN  - bp well controlled on amlodipine and lisinopril     # Hx of HLD  - on statin     # Hx of Anxiety and Depression  - on Seroquel, buspirone and risperidone     #current smoker        DVT prophylaxis: Lovenox  Diet: Diet, DASH/TmL Fluid Restriction ([id number]) (20 @ 18:23)  Dispo: home  Ambulation: as tolerated. Patient uses a rolling walker at baseline

## 2020-02-21 NOTE — DISCHARGE NOTE PROVIDER - CARE PROVIDER_API CALL
Aixa Charles)  Internal Medicine  [address] [place] [address]  Phone: [phone]  Fax: [phone]  Follow Up Time:

## 2020-02-21 NOTE — DISCHARGE NOTE PROVIDER - NSDCMRMEDTOKEN_GEN_ALL_CORE_FT
aspirin 81 mg oral tablet: 1 tab(s) orally once a day  BuSpar 10 mg oral tablet: 3 tab(s) orally 2 times a day  doxepin 50 mg oral capsule: 1 cap(s) orally once a day  fenofibrate 145 mg oral tablet: 1 tab(s) orally once a day  glipiZIDE 10 mg oral tablet: 1 tab(s) orally 2 times a day  Lasix 40 mg oral tablet: 1 tab(s) orally 2 times a day  Lipitor 80 mg oral tablet: 1 tab(s) orally once a day  lisinopril 40 mg oral tablet: 1 tab(s) orally once a day  metFORMIN 1000 mg oral tablet: 1 tab(s) orally 2 times a day  metoprolol tartrate 50 mg oral tablet: 1 tab(s) orally 2 times a day  Norvasc 10 mg oral tablet: 1 tab(s) orally once a day  oxybutynin 5 mg oral tablet: 1 tab(s) orally 2 times a day  Plavix 75 mg oral tablet: 1 tab(s) orally once a day  RisperDAL 1 mg oral tablet: 1 tab(s) orally once a day in AM  RisperDAL 3 mg oral tablet: 1 tab(s) orally once a day (at bedtime)  SEROquel 100 mg oral tablet: 1 tab(s) orally once a day (at bedtime)  Vitamin D3 50,000 intl units (1250 mcg) oral capsule: 1 cap(s) orally once a week aspirin 81 mg oral tablet: 1 tab(s) orally once a day  BuSpar 10 mg oral tablet: 3 tab(s) orally 2 times a day  doxepin 50 mg oral capsule: 1 cap(s) orally once a day  fenofibrate 145 mg oral tablet: 1 tab(s) orally once a day  glipiZIDE 10 mg oral tablet: 1 tab(s) orally 2 times a day  Lasix 40 mg oral tablet: 1 tab(s) orally 2 times a day  Lipitor 80 mg oral tablet: 1 tab(s) orally once a day  lisinopril 40 mg oral tablet: 1 tab(s) orally once a day  metFORMIN 1000 mg oral tablet: 1 tab(s) orally 2 times a day  metoprolol tartrate 50 mg oral tablet: 1 tab(s) orally 2 times a day  Norvasc 10 mg oral tablet: 1 tab(s) orally once a day  oxybutynin 5 mg oral tablet: 1 tab(s) orally 2 times a day  Plavix 75 mg oral tablet: 1 tab(s) orally once a day  predniSONE 20 mg oral tablet: 2 tab(s) orally once a day  RisperDAL 1 mg oral tablet: 1 tab(s) orally once a day in AM  RisperDAL 3 mg oral tablet: 1 tab(s) orally once a day (at bedtime)  SEROquel 100 mg oral tablet: 1 tab(s) orally once a day (at bedtime)  Vitamin D3 50,000 intl units (1250 mcg) oral capsule: 1 cap(s) orally once a week

## 2020-02-21 NOTE — DISCHARGE NOTE PROVIDER - NSDCCPCAREPLAN_GEN_ALL_CORE_FT
PRINCIPAL DISCHARGE DIAGNOSIS  Diagnosis: CHF exacerbation  Assessment and Plan of Treatment: You have a  newly established HFrEF (previous ef=55% in 2016, now 38%) with signs of volume overload on admission. You were  started on Lasix 40mg IV BID and then switched to oral lasix.   Congestive Heart Failure (CHF)  Congestive heart failure is a chronic condition in which the heart has trouble pumping blood. In some cases of heart failure, fluid may back up into your lungs or you may have swelling (edema) in your lower legs. There are many causes of heart failure including high blood pressure, coronary artery disease, abnormal heart valves, heart muscle disease, lung disease, diabetes, etc. Symptoms include shortness of breath with activity or when lying flat, cough, swelling of the legs, fatigue, or increased urination during the night.   Treatment is aimed at managing the symptoms of heart failure and may include lifestyle changes, medications, or surgical procedures. Take medicines only as directed by your health care provider and do not stop unless instructed to do so. Eat heart-healthy foods with low or no trans/saturated fats, cholesterol and salt. Weigh yourself every day for early recognition of fluid accumulation.  SEEK IMMEDIATE MEDICAL CARE IF YOU HAVE ANY OF THE FOLLOWING SYMPTOMS: shortness of breath, change in mental status, chest pain, lightheadedness/dizziness/fainting, or worsening of symptoms including not being able to conduct normal physical activity.         SECONDARY DISCHARGE DIAGNOSES  Diagnosis: COPD exacerbation  Assessment and Plan of Treatment: You  also had a  COPD Exacerbation secondary to RSV infection. You were  continued on your usual home oxygen 4L and a short course of prednisone.   Chronic Obstructive Pulmonary Disease  Chronic obstructive pulmonary disease (COPD) is a lung condition in which airflow from the lungs is limited. Causes include smoking, secondhand smoke exposure, genetics, or recurrent infections. Take all medicines (inhaled or pills) as directed by your health care provider. Avoid exposure to irritants such as smoke, chemicals, and fumes that aggravate your breathing.  If you are a smoker, the most important thing that you can do is stop smoking. Continuing to smoke will cause further lung damage and breathing trouble. Ask your health care provider for help with quitting smoking.  SEEK IMMEDIATE MEDICAL CARE IF YOU HAVE ANY OF THE FOLLOWING SYMPTOMS: shortness of breath at rest or when talking, bluish discoloration of lips, skin, fever, worsening cough, unexplained chest pain, or lightheadedness/dizziness. PRINCIPAL DISCHARGE DIAGNOSIS  Diagnosis: CHF exacerbation  Assessment and Plan of Treatment: You have newly established HFrEF (previous EF 45% in 2018 and now 38%) with signs of volume overload on admission. You were  started on Lasix 40mg IV BID and then switched to oral lasix.   Congestive Heart Failure (CHF)  Congestive heart failure is a chronic condition in which the heart has trouble pumping blood. In some cases of heart failure, fluid may back up into your lungs or you may have swelling (edema) in your lower legs. There are many causes of heart failure including high blood pressure, coronary artery disease, abnormal heart valves, heart muscle disease, lung disease, diabetes, etc. Symptoms include shortness of breath with activity or when lying flat, cough, swelling of the legs, fatigue, or increased urination during the night.   Treatment is aimed at managing the symptoms of heart failure and may include lifestyle changes, medications, or surgical procedures. Take medicines only as directed by your health care provider and do not stop unless instructed to do so. Eat heart-healthy foods with low or no trans/saturated fats, cholesterol and salt. Weigh yourself every day for early recognition of fluid accumulation.  SEEK IMMEDIATE MEDICAL CARE IF YOU HAVE ANY OF THE FOLLOWING SYMPTOMS: shortness of breath, change in mental status, chest pain, lightheadedness/dizziness/fainting, or worsening of symptoms including not being able to conduct normal physical activity.         SECONDARY DISCHARGE DIAGNOSES  Diagnosis: COPD exacerbation  Assessment and Plan of Treatment: You  also had a  COPD Exacerbation secondary to RSV infection. You were  continued on your usual home oxygen 4L and a short course of prednisone.   Chronic Obstructive Pulmonary Disease  Chronic obstructive pulmonary disease (COPD) is a lung condition in which airflow from the lungs is limited. Causes include smoking, secondhand smoke exposure, genetics, or recurrent infections. Take all medicines (inhaled or pills) as directed by your health care provider. Avoid exposure to irritants such as smoke, chemicals, and fumes that aggravate your breathing.  If you are a smoker, the most important thing that you can do is stop smoking. Continuing to smoke will cause further lung damage and breathing trouble. Ask your health care provider for help with quitting smoking.  SEEK IMMEDIATE MEDICAL CARE IF YOU HAVE ANY OF THE FOLLOWING SYMPTOMS: shortness of breath at rest or when talking, bluish discoloration of lips, skin, fever, worsening cough, unexplained chest pain, or lightheadedness/dizziness.

## 2020-02-21 NOTE — PROGRESS NOTE ADULT - ASSESSMENT
This is a 54 year old female with PMHx of CVA (multiple); CHF; COPD on home O2; T2DM; HTN; HLD; Anxiety/Depression who presented with a cc/o dyspnea. She was not feeling well and was finding it increasingly more difficult to breath since the morning of presentation. She also complained of subjective fever, sinus pressure, productive cough (green sputum), and wheezing, which prompted her to come to the ED for further evaluation.     # Acute on chronic systolic CHF, mild to mod MR, mild to mod TR, Mod pulm hypertension  -  Xray Chest 1 View- PORTABLE-Routine (02.19.20 @ 06:57) >lung parenchyma/Pleura: Stable increased interstitial opacities. No significant effusion or pneumothorax.  -  Transthoracic Echocardiogram (02.19.20 @ 11:05) > EF of 38 %.Mild to moderate mitral valve regurgitation.Mild-moderate tricuspid regurgitation.  Mild aortic regurgitation. moderate pulmonary hypertension.  - monitor I/o, daily weight, restrict fluids  - Switch to PO lasix 40 mg q12  - c/w lisinopril, metoprolol    # Acute on chronic hypoxic  resp failure sec to COPD exacerbation sec to RSV infection  - blood Culture Results: No growth to date. (02.18.20 @ 14:50)  - RSV Result: Positive (02.18.20 @ 15:00)  - c/w prednisone taper  - contact isolation  - c/w Duoneb, budesonide  - maintain oxygen sats>92    # H/o CVA with residual weakness  - C/w ASA, plavix, statin    # Hypertension  - c/w lasix, lisinopril, metoprolol, norvasc    # DM type 2  - monitor FS  - increase  lantus, lispro    # H/o depression/anxiety   - c/w home meds    # CKD stage 2 stable    # Nicotine Use  - pt counseled    # DVT prophylaxis    # Full code    #Pending: Clinical improvement, PT eval  # Discussed plan of care with patient  # Disposition: TBD

## 2020-02-22 LAB
ANION GAP SERPL CALC-SCNC: 13 MMOL/L — SIGNIFICANT CHANGE UP (ref 7–14)
BUN SERPL-MCNC: 33 MG/DL — HIGH (ref 10–20)
CALCIUM SERPL-MCNC: 9.6 MG/DL — SIGNIFICANT CHANGE UP (ref 8.5–10.1)
CHLORIDE SERPL-SCNC: 100 MMOL/L — SIGNIFICANT CHANGE UP (ref 98–110)
CO2 SERPL-SCNC: 28 MMOL/L — SIGNIFICANT CHANGE UP (ref 17–32)
CREAT SERPL-MCNC: 1.1 MG/DL — SIGNIFICANT CHANGE UP (ref 0.7–1.5)
ESTIMATED AVERAGE GLUCOSE: 157 MG/DL — HIGH (ref 68–114)
GLUCOSE BLDC GLUCOMTR-MCNC: 159 MG/DL — HIGH (ref 70–99)
GLUCOSE BLDC GLUCOMTR-MCNC: 171 MG/DL — HIGH (ref 70–99)
GLUCOSE BLDC GLUCOMTR-MCNC: 171 MG/DL — HIGH (ref 70–99)
GLUCOSE BLDC GLUCOMTR-MCNC: 192 MG/DL — HIGH (ref 70–99)
GLUCOSE SERPL-MCNC: 252 MG/DL — HIGH (ref 70–99)
HBA1C BLD-MCNC: 7.1 % — HIGH (ref 4–5.6)
HCT VFR BLD CALC: 38.7 % — SIGNIFICANT CHANGE UP (ref 37–47)
HGB BLD-MCNC: 12.5 G/DL — SIGNIFICANT CHANGE UP (ref 12–16)
MCHC RBC-ENTMCNC: 29.2 PG — SIGNIFICANT CHANGE UP (ref 27–31)
MCHC RBC-ENTMCNC: 32.3 G/DL — SIGNIFICANT CHANGE UP (ref 32–37)
MCV RBC AUTO: 90.4 FL — SIGNIFICANT CHANGE UP (ref 81–99)
NRBC # BLD: 0 /100 WBCS — SIGNIFICANT CHANGE UP (ref 0–0)
PLATELET # BLD AUTO: 249 K/UL — SIGNIFICANT CHANGE UP (ref 130–400)
POTASSIUM SERPL-MCNC: 4.2 MMOL/L — SIGNIFICANT CHANGE UP (ref 3.5–5)
POTASSIUM SERPL-SCNC: 4.2 MMOL/L — SIGNIFICANT CHANGE UP (ref 3.5–5)
RBC # BLD: 4.28 M/UL — SIGNIFICANT CHANGE UP (ref 4.2–5.4)
RBC # FLD: 15.6 % — HIGH (ref 11.5–14.5)
SODIUM SERPL-SCNC: 141 MMOL/L — SIGNIFICANT CHANGE UP (ref 135–146)
WBC # BLD: 8.99 K/UL — SIGNIFICANT CHANGE UP (ref 4.8–10.8)
WBC # FLD AUTO: 8.99 K/UL — SIGNIFICANT CHANGE UP (ref 4.8–10.8)

## 2020-02-22 PROCEDURE — 71045 X-RAY EXAM CHEST 1 VIEW: CPT | Mod: 26

## 2020-02-22 PROCEDURE — 99233 SBSQ HOSP IP/OBS HIGH 50: CPT

## 2020-02-22 RX ORDER — LACTULOSE 10 G/15ML
15 SOLUTION ORAL THREE TIMES A DAY
Refills: 0 | Status: DISCONTINUED | OUTPATIENT
Start: 2020-02-22 | End: 2020-02-23

## 2020-02-22 RX ORDER — INSULIN LISPRO 100/ML
5 VIAL (ML) SUBCUTANEOUS
Refills: 0 | Status: DISCONTINUED | OUTPATIENT
Start: 2020-02-22 | End: 2020-02-24

## 2020-02-22 RX ADMIN — Medication 50 MILLIGRAM(S): at 17:14

## 2020-02-22 RX ADMIN — Medication 1: at 07:52

## 2020-02-22 RX ADMIN — Medication 5 MILLIGRAM(S): at 17:13

## 2020-02-22 RX ADMIN — Medication 3 UNIT(S): at 07:52

## 2020-02-22 RX ADMIN — Medication 5 UNIT(S): at 17:06

## 2020-02-22 RX ADMIN — Medication 40 MILLIGRAM(S): at 17:13

## 2020-02-22 RX ADMIN — PANTOPRAZOLE SODIUM 40 MILLIGRAM(S): 20 TABLET, DELAYED RELEASE ORAL at 07:53

## 2020-02-22 RX ADMIN — Medication 30 MILLIGRAM(S): at 17:12

## 2020-02-22 RX ADMIN — ATORVASTATIN CALCIUM 80 MILLIGRAM(S): 80 TABLET, FILM COATED ORAL at 21:21

## 2020-02-22 RX ADMIN — Medication 1: at 11:38

## 2020-02-22 RX ADMIN — Medication 81 MILLIGRAM(S): at 11:05

## 2020-02-22 RX ADMIN — Medication 50 MILLIGRAM(S): at 05:23

## 2020-02-22 RX ADMIN — RISPERIDONE 1 MILLIGRAM(S): 4 TABLET ORAL at 11:05

## 2020-02-22 RX ADMIN — CLOPIDOGREL BISULFATE 75 MILLIGRAM(S): 75 TABLET, FILM COATED ORAL at 11:06

## 2020-02-22 RX ADMIN — Medication 40 MILLIGRAM(S): at 05:23

## 2020-02-22 RX ADMIN — SENNA PLUS 2 TABLET(S): 8.6 TABLET ORAL at 21:21

## 2020-02-22 RX ADMIN — AMLODIPINE BESYLATE 10 MILLIGRAM(S): 2.5 TABLET ORAL at 05:24

## 2020-02-22 RX ADMIN — Medication 5 UNIT(S): at 11:38

## 2020-02-22 RX ADMIN — BUDESONIDE AND FORMOTEROL FUMARATE DIHYDRATE 2 PUFF(S): 160; 4.5 AEROSOL RESPIRATORY (INHALATION) at 11:05

## 2020-02-22 RX ADMIN — Medication 1: at 17:06

## 2020-02-22 RX ADMIN — QUETIAPINE FUMARATE 100 MILLIGRAM(S): 200 TABLET, FILM COATED ORAL at 21:21

## 2020-02-22 RX ADMIN — ENOXAPARIN SODIUM 40 MILLIGRAM(S): 100 INJECTION SUBCUTANEOUS at 21:20

## 2020-02-22 RX ADMIN — LISINOPRIL 40 MILLIGRAM(S): 2.5 TABLET ORAL at 05:23

## 2020-02-22 RX ADMIN — Medication 30 MILLIGRAM(S): at 05:24

## 2020-02-22 RX ADMIN — Medication 5 MILLIGRAM(S): at 05:23

## 2020-02-22 RX ADMIN — Medication 40 MILLIGRAM(S): at 05:27

## 2020-02-22 RX ADMIN — RISPERIDONE 3 MILLIGRAM(S): 4 TABLET ORAL at 17:15

## 2020-02-22 NOTE — PROGRESS NOTE ADULT - ASSESSMENT
This is a 54 year old female with PMHx of CVA (multiple); CHF; COPD on home O2; T2DM; HTN; HLD; Anxiety/Depression who presented with a cc/o dyspnea. She was not feeling well and was finding it increasingly more difficult to breath since the morning of presentation. She also complained of subjective fever, sinus pressure, productive cough (green sputum), and wheezing, which prompted her to come to the ED for further evaluation.     # Acute on chronic systolic CHF, mild to mod MR, mild to mod TR, Mod pulm hypertension  -  Xray Chest 1 View- PORTABLE-Routine (02.19.20 @ 06:57) >lung parenchyma/Pleura: Stable increased interstitial opacities. No significant effusion or pneumothorax.  -  Transthoracic Echocardiogram (02.19.20 @ 11:05) > EF of 38 %.Mild to moderate mitral valve regurgitation.Mild-moderate tricuspid regurgitation.  Mild aortic regurgitation. moderate pulmonary hypertension.  - monitor I/o, daily weight, restrict fluids  - c/w  PO lasix 40 mg q12  - c/w lisinopril, metoprolol  - xray chest    # Acute on chronic hypoxic  resp failure sec to COPD exacerbation sec to RSV infection  - blood Culture Results: No growth to date. (02.18.20 @ 14:50)  - RSV Result: Positive (02.18.20 @ 15:00)  - c/w prednisone taper  - contact isolation  - c/w Duoneb, budesonide  - maintain oxygen sats>92    # H/o CVA with residual weakness  - C/w ASA, plavix, statin    # Hypertension  - c/w lasix, lisinopril, metoprolol, norvasc    # DM type 2  - monitor FS  - c/w  lantus, lispro    # H/o depression/anxiety   - c/w home meds    # CKD stage 2 stable    # Nicotine Use  - pt counseled    # Constipation  - started on lactulose    # DVT prophylaxis    # Full code    #Pending: Clinical improvement, PT eval  # Discussed plan of care with patient  # Disposition: TBD

## 2020-02-22 NOTE — PROGRESS NOTE ADULT - SUBJECTIVE AND OBJECTIVE BOX
WILLIAN MARY 54y Female  MRN#: [id number]     SUBJECTIVE  Patient is a 54y old Female who presents with a chief complaint of CHF Exacerbation; COPD Exacerbation due to RSV infection; Elevated Troponin (2020 16:27)  Currently admitted to medicine with the primary diagnosis of CHF exacerbation  Today is hospital day 4d, and this morning she is says she feels better in regards to her breathing    OBJECTIVE  PAST MEDICAL & SURGICAL HISTORY  CKD (chronic kidney disease), stage II  Type 2 diabetes mellitus  Cerebrovascular accident (CVA): Multiple  CHF (congestive heart failure)  COPD, severe  Anxiety and depression  Hyperlipidemia  Hypertension  No significant past surgical history    ALLERGIES:  No Known Allergies    MEDICATIONS:  STANDING MEDICATIONS  albuterol/ipratropium for Nebulization 3 milliLiter(s) Nebulizer every 4 hours  amLODIPine   Tablet 10 milliGRAM(s) Oral daily  aspirin enteric coated 81 milliGRAM(s) Oral daily  atorvastatin 80 milliGRAM(s) Oral at bedtime  budesonide 160 MICROgram(s)/formoterol 4.5 MICROgram(s) Inhaler 2 Puff(s) Inhalation two times a day  busPIRone 30 milliGRAM(s) Oral two times a day  chlorhexidine 4% Liquid 1 Application(s) Topical <User Schedule>  clopidogrel Tablet 75 milliGRAM(s) Oral daily  dextrose 5%. 1000 milliLiter(s) IV Continuous <Continuous>  dextrose 50% Injectable 12.5 Gram(s) IV Push once  dextrose 50% Injectable 25 Gram(s) IV Push once  dextrose 50% Injectable 25 Gram(s) IV Push once  enoxaparin Injectable 40 milliGRAM(s) SubCutaneous at bedtime  furosemide    Tablet 40 milliGRAM(s) Oral two times a day  insulin glargine Injectable (LANTUS) 7 Unit(s) SubCutaneous at bedtime  insulin lispro (HumaLOG) corrective regimen sliding scale   SubCutaneous three times a day before meals  insulin lispro Injectable (HumaLOG) 3 Unit(s) SubCutaneous three times a day before meals  lactulose Syrup 15 Gram(s) Oral three times a day  lisinopril 40 milliGRAM(s) Oral daily  metoprolol tartrate 50 milliGRAM(s) Oral two times a day  oxybutynin 5 milliGRAM(s) Oral two times a day  pantoprazole    Tablet 40 milliGRAM(s) Oral before breakfast  predniSONE   Tablet 40 milliGRAM(s) Oral daily  QUEtiapine 100 milliGRAM(s) Oral at bedtime  risperiDONE   Tablet 1 milliGRAM(s) Oral daily  risperiDONE   Tablet 3 milliGRAM(s) Oral <User Schedule>  senna 2 Tablet(s) Oral at bedtime    PRN MEDICATIONS  acetaminophen   Tablet .. 650 milliGRAM(s) Oral every 6 hours PRN  dextrose 40% Gel 15 Gram(s) Oral once PRN  glucagon  Injectable 1 milliGRAM(s) IntraMuscular once PRN  lactulose Syrup 15 Gram(s) Oral two times a day PRN      VITAL SIGNS: Last 24 Hours  T(C): 36.1 (2020 05:55), Max: 36.4 (2020 14:15)  T(F): 96.9 (2020 05:55), Max: 97.6 (2020 14:15)  HR: 66 (2020 05:55) (63 - 70)  BP: 121/74 (2020 05:55) (121/74 - 123/74)  BP(mean): 94 (2020 14:15) (94 - 94)  RR: 18 (2020 05:55) (18 - 18)  SpO2: 96% (2020 14:15) (96% - 96%)    LABS:                        12.5   8.99  )-----------( 249      ( 2020 09:25 )             38.7     02-21    141  |  99  |  27<H>  ----------------------------<  195<H>  4.2   |  28  |  1.0    Ca    9.5      2020 08:25    TPro  7.0  /  Alb  3.9  /  TBili  0.3  /  DBili  x   /  AST  16  /  ALT  21  /  AlkPhos  66  02-21    PHYSICAL EXAM:  GENERAL: NAD, well-developed, AAOx3  HEENT:  Atraumatic, Normocephalic. EOMI, PERRLA, conjunctiva and sclera clear, No JVD  PULMONARY: mild expiratory wheezing, mild bibasilar crackles.   CARDIOVASCULAR: Regular rate and rhythm; No murmurs, rubs, or gallops  GASTROINTESTINAL: Soft, Nontender, Nondistended; Bowel sounds present  MUSCULOSKELETAL:  2+ Peripheral Pulses, No clubbing, cyanosis, or edema  NEUROLOGY: non-focal  SKIN: No rashes or lesions    ASSESSMENT & PLAN    54 year old female with a history of multiple CVAs, HFrEF (new diagnosis, ef=38%), DMII, HTN. Patient is presenting for shortness of breath and fever. She was found to have RSV positive. Admitted for COPD and CHF exaccerbation secondary to RSV    # HFrEF, acute CHF exaccerbation precipitated by viral infection  - Newly established HFrEF (previous ef=55% in 2016, now 38%) with signs of volume overload on admission.   - on Lasix 40mg IV BID. Will switch to oral 40mg BID. Home dose =40mg once daily  - will make lasix 40mg once daily as of tomorrow.   - I and O, today net balance = - 500ml  - on ACE-I and BB  - f/u cxr     # COPD Exacerbation secondary to RSV infection:  - on home oxygen 4L. Continue 4L o2 here  - Started on Symbicort; Duonebs standing Q4H;  - prednisone 40mg daily for 5 days    #constipation  - likley due to decreased ambulation  - will start lactulose until she was a bowel movement.    #Hx of Multiple CVA (8x) with mild left sided weakness  - on aspirin and plavix and statin     #Hx of DMII,   - HbA1c = 6.6%  - FS better controlled compared to yesterday but still not optimal. Will increase lispro    #Possible CKD, GFR=85%  - stable Cr    # Hx of HTN  - bp well controlled on amlodipine and lisinopril     # Hx of HLD  - on statin     # Hx of Anxiety and Depression  - on Seroquel, buspirone and risperidone     #current smoker      DVT prophylaxis: Lovenox  Diet: Diet, DASH/TmL Fluid Restriction   Dispo: home  Ambulation: as tolerated. Patient uses a rolling walker at baseline

## 2020-02-22 NOTE — PROGRESS NOTE ADULT - SUBJECTIVE AND OBJECTIVE BOX
Patient is a 54y old  Female who presents with a chief complaint of SOB    Patient was seen and examined.  C/o constipation  Sob improving, c/o cough  Denies any other complaints.  All systems reviewed positive history as mentioned above.    PAST MEDICAL & SURGICAL HISTORY:  CKD (chronic kidney disease), stage II  Type 2 diabetes mellitus  Cerebrovascular accident (CVA): Multiple  CHF (congestive heart failure)  COPD, severe  Anxiety and depression  Hyperlipidemia  Hypertension  No significant past surgical history    Allergies  No Known Allergies    MEDICATIONS  (STANDING):  albuterol/ipratropium for Nebulization 3 milliLiter(s) Nebulizer every 4 hours  amLODIPine   Tablet 10 milliGRAM(s) Oral daily  aspirin enteric coated 81 milliGRAM(s) Oral daily  atorvastatin 80 milliGRAM(s) Oral at bedtime  budesonide 160 MICROgram(s)/formoterol 4.5 MICROgram(s) Inhaler 2 Puff(s) Inhalation two times a day  busPIRone 30 milliGRAM(s) Oral two times a day  chlorhexidine 4% Liquid 1 Application(s) Topical <User Schedule>  clopidogrel Tablet 75 milliGRAM(s) Oral daily  dextrose 5%. 1000 milliLiter(s) (50 mL/Hr) IV Continuous <Continuous>  dextrose 50% Injectable 12.5 Gram(s) IV Push once  dextrose 50% Injectable 25 Gram(s) IV Push once  dextrose 50% Injectable 25 Gram(s) IV Push once  enoxaparin Injectable 40 milliGRAM(s) SubCutaneous at bedtime  furosemide    Tablet 40 milliGRAM(s) Oral two times a day  insulin glargine Injectable (LANTUS) 7 Unit(s) SubCutaneous at bedtime  insulin lispro (HumaLOG) corrective regimen sliding scale   SubCutaneous three times a day before meals  insulin lispro Injectable (HumaLOG) 5 Unit(s) SubCutaneous three times a day before meals  lactulose Syrup 15 Gram(s) Oral three times a day  lisinopril 40 milliGRAM(s) Oral daily  metoprolol tartrate 50 milliGRAM(s) Oral two times a day  oxybutynin 5 milliGRAM(s) Oral two times a day  pantoprazole    Tablet 40 milliGRAM(s) Oral before breakfast  predniSONE   Tablet 40 milliGRAM(s) Oral daily  QUEtiapine 100 milliGRAM(s) Oral at bedtime  risperiDONE   Tablet 1 milliGRAM(s) Oral daily  risperiDONE   Tablet 3 milliGRAM(s) Oral <User Schedule>  senna 2 Tablet(s) Oral at bedtime    MEDICATIONS  (PRN):  acetaminophen   Tablet .. 650 milliGRAM(s) Oral every 6 hours PRN Temp greater or equal to 38C (100.4F)  dextrose 40% Gel 15 Gram(s) Oral once PRN Blood Glucose LESS THAN 70 milliGRAM(s)/deciliter  glucagon  Injectable 1 milliGRAM(s) IntraMuscular once PRN Glucose LESS THAN 70 milligrams/deciliter  lactulose Syrup 15 Gram(s) Oral two times a day PRN Constipation    T(C): 35.8 (02-22-20 @ 15:09), Max: 36.3 (02-21-20 @ 22:38)  HR: 65 (02-22-20 @ 15:09) (63 - 66)  BP: 119/74 (02-22-20 @ 15:09) (119/74 - 123/74)  RR: 19 (02-22-20 @ 15:09) (18 - 19)  SpO2: --    O/E:  Awake, alert, not in distress.  HEENT: atraumatic, EOMI.  Chest: decreased breath sounds at bases  CVS: SIS2 +, systolic murmur+  P/A: Soft, BS+  CNS: non focal.  Ext: no edema feet.  Skin: no rash, no ulcers.  All systems reviewed positive findings as above.                          12.5   8.99  )-----------( 249      ( 22 Feb 2020 09:25 )             38.7                         12.9   7.93  )-----------( 240      ( 21 Feb 2020 08:25 )             40.3   02-22    141  |  100  |  33<H>  ----------------------------<  252<H>  4.2   |  28  |  1.1  02-21    141  |  99  |  27<H>  ----------------------------<  195<H>  4.2   |  28  |  1.0    Ca    9.6      22 Feb 2020 09:25  Ca    9.5      21 Feb 2020 08:25    TPro  7.0  /  Alb  3.9  /  TBili  0.3  /  DBili  x   /  AST  16  /  ALT  21  /  AlkPhos  66  02-21

## 2020-02-23 LAB
ANION GAP SERPL CALC-SCNC: 13 MMOL/L — SIGNIFICANT CHANGE UP (ref 7–14)
BUN SERPL-MCNC: 32 MG/DL — HIGH (ref 10–20)
CALCIUM SERPL-MCNC: 9.1 MG/DL — SIGNIFICANT CHANGE UP (ref 8.5–10.1)
CHLORIDE SERPL-SCNC: 102 MMOL/L — SIGNIFICANT CHANGE UP (ref 98–110)
CO2 SERPL-SCNC: 29 MMOL/L — SIGNIFICANT CHANGE UP (ref 17–32)
CREAT SERPL-MCNC: 0.8 MG/DL — SIGNIFICANT CHANGE UP (ref 0.7–1.5)
CULTURE RESULTS: SIGNIFICANT CHANGE UP
CULTURE RESULTS: SIGNIFICANT CHANGE UP
GLUCOSE BLDC GLUCOMTR-MCNC: 146 MG/DL — HIGH (ref 70–99)
GLUCOSE BLDC GLUCOMTR-MCNC: 173 MG/DL — HIGH (ref 70–99)
GLUCOSE BLDC GLUCOMTR-MCNC: 224 MG/DL — HIGH (ref 70–99)
GLUCOSE BLDC GLUCOMTR-MCNC: 241 MG/DL — HIGH (ref 70–99)
GLUCOSE SERPL-MCNC: 172 MG/DL — HIGH (ref 70–99)
HCT VFR BLD CALC: 39.3 % — SIGNIFICANT CHANGE UP (ref 37–47)
HGB BLD-MCNC: 12.3 G/DL — SIGNIFICANT CHANGE UP (ref 12–16)
MAGNESIUM SERPL-MCNC: 1.9 MG/DL — SIGNIFICANT CHANGE UP (ref 1.8–2.4)
MCHC RBC-ENTMCNC: 28.1 PG — SIGNIFICANT CHANGE UP (ref 27–31)
MCHC RBC-ENTMCNC: 31.3 G/DL — LOW (ref 32–37)
MCV RBC AUTO: 89.9 FL — SIGNIFICANT CHANGE UP (ref 81–99)
NRBC # BLD: 0 /100 WBCS — SIGNIFICANT CHANGE UP (ref 0–0)
PLATELET # BLD AUTO: 259 K/UL — SIGNIFICANT CHANGE UP (ref 130–400)
POTASSIUM SERPL-MCNC: 3.8 MMOL/L — SIGNIFICANT CHANGE UP (ref 3.5–5)
POTASSIUM SERPL-SCNC: 3.8 MMOL/L — SIGNIFICANT CHANGE UP (ref 3.5–5)
RBC # BLD: 4.37 M/UL — SIGNIFICANT CHANGE UP (ref 4.2–5.4)
RBC # FLD: 15.3 % — HIGH (ref 11.5–14.5)
SODIUM SERPL-SCNC: 144 MMOL/L — SIGNIFICANT CHANGE UP (ref 135–146)
SPECIMEN SOURCE: SIGNIFICANT CHANGE UP
SPECIMEN SOURCE: SIGNIFICANT CHANGE UP
WBC # BLD: 8.75 K/UL — SIGNIFICANT CHANGE UP (ref 4.8–10.8)
WBC # FLD AUTO: 8.75 K/UL — SIGNIFICANT CHANGE UP (ref 4.8–10.8)

## 2020-02-23 PROCEDURE — 93010 ELECTROCARDIOGRAM REPORT: CPT

## 2020-02-23 PROCEDURE — 99233 SBSQ HOSP IP/OBS HIGH 50: CPT

## 2020-02-23 RX ADMIN — Medication 5 MILLIGRAM(S): at 17:18

## 2020-02-23 RX ADMIN — AMLODIPINE BESYLATE 10 MILLIGRAM(S): 2.5 TABLET ORAL at 05:36

## 2020-02-23 RX ADMIN — Medication 2: at 17:11

## 2020-02-23 RX ADMIN — Medication 40 MILLIGRAM(S): at 05:37

## 2020-02-23 RX ADMIN — Medication 30 MILLIGRAM(S): at 17:18

## 2020-02-23 RX ADMIN — Medication 30 MILLIGRAM(S): at 05:37

## 2020-02-23 RX ADMIN — ATORVASTATIN CALCIUM 80 MILLIGRAM(S): 80 TABLET, FILM COATED ORAL at 21:06

## 2020-02-23 RX ADMIN — SENNA PLUS 2 TABLET(S): 8.6 TABLET ORAL at 21:07

## 2020-02-23 RX ADMIN — LISINOPRIL 40 MILLIGRAM(S): 2.5 TABLET ORAL at 05:37

## 2020-02-23 RX ADMIN — ENOXAPARIN SODIUM 40 MILLIGRAM(S): 100 INJECTION SUBCUTANEOUS at 21:07

## 2020-02-23 RX ADMIN — Medication 5 UNIT(S): at 11:57

## 2020-02-23 RX ADMIN — Medication 40 MILLIGRAM(S): at 17:18

## 2020-02-23 RX ADMIN — Medication 5 MILLIGRAM(S): at 05:37

## 2020-02-23 RX ADMIN — Medication 40 MILLIGRAM(S): at 05:38

## 2020-02-23 RX ADMIN — CLOPIDOGREL BISULFATE 75 MILLIGRAM(S): 75 TABLET, FILM COATED ORAL at 11:20

## 2020-02-23 RX ADMIN — Medication 50 MILLIGRAM(S): at 05:37

## 2020-02-23 RX ADMIN — Medication 5 UNIT(S): at 17:12

## 2020-02-23 RX ADMIN — Medication 1: at 07:48

## 2020-02-23 RX ADMIN — QUETIAPINE FUMARATE 100 MILLIGRAM(S): 200 TABLET, FILM COATED ORAL at 21:06

## 2020-02-23 RX ADMIN — RISPERIDONE 1 MILLIGRAM(S): 4 TABLET ORAL at 11:20

## 2020-02-23 RX ADMIN — Medication 50 MILLIGRAM(S): at 17:18

## 2020-02-23 RX ADMIN — PANTOPRAZOLE SODIUM 40 MILLIGRAM(S): 20 TABLET, DELAYED RELEASE ORAL at 07:48

## 2020-02-23 RX ADMIN — Medication 5 UNIT(S): at 07:49

## 2020-02-23 RX ADMIN — BUDESONIDE AND FORMOTEROL FUMARATE DIHYDRATE 2 PUFF(S): 160; 4.5 AEROSOL RESPIRATORY (INHALATION) at 07:51

## 2020-02-23 RX ADMIN — Medication 2: at 11:57

## 2020-02-23 RX ADMIN — RISPERIDONE 3 MILLIGRAM(S): 4 TABLET ORAL at 17:18

## 2020-02-23 RX ADMIN — Medication 81 MILLIGRAM(S): at 11:20

## 2020-02-23 NOTE — PROGRESS NOTE ADULT - SUBJECTIVE AND OBJECTIVE BOX
Patient is a 54y old  Female who presents with a chief complaint of SOB    Patient was seen and examined.  Sob and cough  improving  Denies any other complaints.  All systems reviewed positive history as mentioned above.    PAST MEDICAL & SURGICAL HISTORY:  CKD (chronic kidney disease), stage II  Type 2 diabetes mellitus  Cerebrovascular accident (CVA): Multiple  CHF (congestive heart failure)  COPD, severe  Anxiety and depression  Hyperlipidemia  Hypertension  No significant past surgical history    Allergies  No Known Allergies    MEDICATIONS  (STANDING):  albuterol/ipratropium for Nebulization 3 milliLiter(s) Nebulizer every 4 hours  amLODIPine   Tablet 10 milliGRAM(s) Oral daily  aspirin enteric coated 81 milliGRAM(s) Oral daily  atorvastatin 80 milliGRAM(s) Oral at bedtime  budesonide 160 MICROgram(s)/formoterol 4.5 MICROgram(s) Inhaler 2 Puff(s) Inhalation two times a day  busPIRone 30 milliGRAM(s) Oral two times a day  chlorhexidine 4% Liquid 1 Application(s) Topical <User Schedule>  clopidogrel Tablet 75 milliGRAM(s) Oral daily  dextrose 5%. 1000 milliLiter(s) (50 mL/Hr) IV Continuous <Continuous>  dextrose 50% Injectable 12.5 Gram(s) IV Push once  dextrose 50% Injectable 25 Gram(s) IV Push once  dextrose 50% Injectable 25 Gram(s) IV Push once  enoxaparin Injectable 40 milliGRAM(s) SubCutaneous at bedtime  furosemide    Tablet 40 milliGRAM(s) Oral two times a day  insulin glargine Injectable (LANTUS) 7 Unit(s) SubCutaneous at bedtime  insulin lispro (HumaLOG) corrective regimen sliding scale   SubCutaneous three times a day before meals  insulin lispro Injectable (HumaLOG) 5 Unit(s) SubCutaneous three times a day before meals  lactulose Syrup 15 Gram(s) Oral three times a day  lisinopril 40 milliGRAM(s) Oral daily  metoprolol tartrate 50 milliGRAM(s) Oral two times a day  oxybutynin 5 milliGRAM(s) Oral two times a day  pantoprazole    Tablet 40 milliGRAM(s) Oral before breakfast  QUEtiapine 100 milliGRAM(s) Oral at bedtime  risperiDONE   Tablet 1 milliGRAM(s) Oral daily  risperiDONE   Tablet 3 milliGRAM(s) Oral <User Schedule>  senna 2 Tablet(s) Oral at bedtime    MEDICATIONS  (PRN):  acetaminophen   Tablet .. 650 milliGRAM(s) Oral every 6 hours PRN Temp greater or equal to 38C (100.4F)  dextrose 40% Gel 15 Gram(s) Oral once PRN Blood Glucose LESS THAN 70 milliGRAM(s)/deciliter  glucagon  Injectable 1 milliGRAM(s) IntraMuscular once PRN Glucose LESS THAN 70 milligrams/deciliter  lactulose Syrup 15 Gram(s) Oral two times a day PRN Constipation    T(C): 35.7 (02-23-20 @ 05:55), Max: 35.8 (02-22-20 @ 15:09)  HR: 78 (02-23-20 @ 05:55) (65 - 78)  BP: 115/72 (02-23-20 @ 05:55) (115/72 - 142/76)  RR: 20 (02-23-20 @ 05:55) (19 - 20)  SpO2: 98% (02-23-20 @ 05:55) (98% - 98%)    O/E:  Awake, alert, not in distress.  HEENT: atraumatic, EOMI.  Chest: decreased breath sounds at bases  CVS: SIS2 +, systolic murmur+  P/A: Soft, BS+  CNS: non focal.  Ext: no edema feet.  Skin: no rash, no ulcers.  All systems reviewed positive findings as above.                                   12.3   8.75  )-----------( 259      ( 23 Feb 2020 08:46 )             39.3                         12.5   8.99  )-----------( 249      ( 22 Feb 2020 09:25 )             38.7   02-23    144  |  102  |  32<H>  ----------------------------<  172<H>  3.8   |  29  |  0.8  02-22    141  |  100  |  33<H>  ----------------------------<  252<H>  4.2   |  28  |  1.1    Ca    9.1      23 Feb 2020 08:46  Ca    9.6      22 Feb 2020 09:25  Mg     1.9     02-23

## 2020-02-23 NOTE — PROGRESS NOTE ADULT - ASSESSMENT
This is a 54 year old female with PMHx of CVA (multiple); CHF; COPD on home O2; T2DM; HTN; HLD; Anxiety/Depression who presented with a cc/o dyspnea. She was not feeling well and was finding it increasingly more difficult to breath since the morning of presentation. She also complained of subjective fever, sinus pressure, productive cough (green sputum), and wheezing, which prompted her to come to the ED for further evaluation.     # Acute on chronic systolic CHF, mild to mod MR, mild to mod TR, Mod pulm hypertension  -  Xray Chest 1 View- PORTABLE-Routine (02.19.20 @ 06:57) >lung parenchyma/Pleura: Stable increased interstitial opacities. No significant effusion or pneumothorax.  -  Transthoracic Echocardiogram (02.19.20 @ 11:05) > EF of 38 %.Mild to moderate mitral valve regurgitation.Mild-moderate tricuspid regurgitation.  Mild aortic regurgitation. moderate pulmonary hypertension.  - monitor I/o, daily weight, restrict fluids  - c/w  PO lasix 40 mg q12  - c/w lisinopril, metoprolol  - Xray Chest 1 View- PORTABLE-Urgent (02.22.20 @ 10:24) >Pulmonary edema/bilateral interstitial opacities, not significantly changed.    # Acute on chronic hypoxic  resp failure sec to COPD exacerbation sec to RSV infection  - blood Culture Results: No growth to date. (02.18.20 @ 14:50)  - RSV Result: Positive (02.18.20 @ 15:00)  - c/w prednisone taper  - contact isolation  - c/w Duoneb, budesonide  - maintain oxygen sats>92    # H/o CVA with residual weakness  - C/w ASA, plavix, statin    # Hypertension  - c/w lasix, lisinopril, metoprolol, norvasc    # DM type 2  - monitor FS  - c/w  lantus, lispro    # H/o depression/anxiety   - c/w home meds    # CKD stage 2 stable    # Nicotine Use  - pt counseled    # Constipation-resolved      # DVT prophylaxis    # Full code    #Pending: Clinical improvement  # Discussed plan of care with patient  # Disposition: TBD

## 2020-02-24 ENCOUNTER — TRANSCRIPTION ENCOUNTER (OUTPATIENT)
Age: 55
End: 2020-02-24

## 2020-02-24 VITALS
SYSTOLIC BLOOD PRESSURE: 133 MMHG | TEMPERATURE: 97 F | DIASTOLIC BLOOD PRESSURE: 68 MMHG | HEART RATE: 69 BPM | RESPIRATION RATE: 20 BRPM

## 2020-02-24 LAB
GLUCOSE BLDC GLUCOMTR-MCNC: 163 MG/DL — HIGH (ref 70–99)
GLUCOSE BLDC GLUCOMTR-MCNC: 180 MG/DL — HIGH (ref 70–99)

## 2020-02-24 PROCEDURE — 99239 HOSP IP/OBS DSCHRG MGMT >30: CPT

## 2020-02-24 RX ORDER — FUROSEMIDE 40 MG
40 TABLET ORAL
Refills: 0 | Status: DISCONTINUED | OUTPATIENT
Start: 2020-02-24 | End: 2020-02-24

## 2020-02-24 RX ADMIN — LISINOPRIL 40 MILLIGRAM(S): 2.5 TABLET ORAL at 05:26

## 2020-02-24 RX ADMIN — Medication 5 MILLIGRAM(S): at 05:26

## 2020-02-24 RX ADMIN — RISPERIDONE 1 MILLIGRAM(S): 4 TABLET ORAL at 11:23

## 2020-02-24 RX ADMIN — Medication 1: at 12:26

## 2020-02-24 RX ADMIN — AMLODIPINE BESYLATE 10 MILLIGRAM(S): 2.5 TABLET ORAL at 05:26

## 2020-02-24 RX ADMIN — Medication 40 MILLIGRAM(S): at 18:44

## 2020-02-24 RX ADMIN — Medication 81 MILLIGRAM(S): at 11:23

## 2020-02-24 RX ADMIN — Medication 40 MILLIGRAM(S): at 05:26

## 2020-02-24 RX ADMIN — Medication 3 MILLILITER(S): at 12:04

## 2020-02-24 RX ADMIN — RISPERIDONE 3 MILLIGRAM(S): 4 TABLET ORAL at 18:44

## 2020-02-24 RX ADMIN — Medication 5 UNIT(S): at 08:00

## 2020-02-24 RX ADMIN — Medication 50 MILLIGRAM(S): at 18:45

## 2020-02-24 RX ADMIN — Medication 3 MILLILITER(S): at 08:55

## 2020-02-24 RX ADMIN — Medication 5 MILLIGRAM(S): at 18:44

## 2020-02-24 RX ADMIN — PANTOPRAZOLE SODIUM 40 MILLIGRAM(S): 20 TABLET, DELAYED RELEASE ORAL at 08:01

## 2020-02-24 RX ADMIN — Medication 5 UNIT(S): at 12:26

## 2020-02-24 RX ADMIN — Medication 30 MILLIGRAM(S): at 18:44

## 2020-02-24 RX ADMIN — Medication 1: at 07:59

## 2020-02-24 RX ADMIN — Medication 30 MILLIGRAM(S): at 05:26

## 2020-02-24 RX ADMIN — CLOPIDOGREL BISULFATE 75 MILLIGRAM(S): 75 TABLET, FILM COATED ORAL at 11:23

## 2020-02-24 NOTE — PROGRESS NOTE ADULT - REASON FOR ADMISSION
CHF Exacerbation; COPD Exacerbation due to RSV infection; Elevated Troponin

## 2020-02-24 NOTE — DISCHARGE NOTE NURSING/CASE MANAGEMENT/SOCIAL WORK - NSDCPEPTSTRK_GEN_ALL_CORE
Need for follow up after discharge/Prescribed medications/Risk factors for stroke/Stroke education booklet/Signs and symptoms of stroke/Stroke warning signs and symptoms/Call 911 for stroke/Stroke support groups for patients, families, and friends

## 2020-02-24 NOTE — PROGRESS NOTE ADULT - ASSESSMENT
This is a 53 y/o woman with PMH of CVA (multiple),CHF, COPD on home O2, T2DM, HTN, hyperlipidemia and  Anxiety/Depression who presented with dyspnea. She was not feeling well and was finding it increasingly more difficult to breath since the morning of presentation. She also complained of subjective fever, sinus pressure, productive cough (green sputum), and wheezing, which prompted her to come to the ED for further evaluation.     # Acute on chronic systolic CHF, mild to mod MR, mild to mod TR, Mod pulm hypertension  - clinically improved  - monitor I/o, daily weights, restrict fluids  - c/w  PO lasix 40 mg q12  - c/w lisinopril, metoprolol    # Acute on chronic hypoxic resp failure sec to COPD exacerbation sec to RSV infection  - blood Culture Results: No growth to date. (02.18.20 @ 14:50)  - RSV Result: Positive (02.18.20 @ 15:00)  - c/w prednisone taper  - contact isolation  - c/w Duoneb, budesonide  - maintain oxygen sats>92    # H/o CVA with residual weakness  - C/w ASA, plavix, statin    # Hypertension - controlled  - c/w lasix, lisinopril, metoprolol, norvasc    # DM type 2  - monitor FS  - c/w  lantus, lispro    # H/o depression/anxiety   - c/w home meds    # DVT prophylaxis    # Full code        #Pending: PT consult    Disposition: Pt may be discharged home with care if she is able to ambulate safely with rollator This is a 55 y/o woman with PMH of CVA (multiple),CHF, COPD on home O2, T2DM, HTN, hyperlipidemia and  Anxiety/Depression who presented with dyspnea. She was not feeling well and was finding it increasingly more difficult to breath since the morning of presentation. She also complained of subjective fever, sinus pressure, productive cough (green sputum), and wheezing, which prompted her to come to the ED for further evaluation.     # Acute on chronic systolic CHF, mild to mod MR, mild to mod TR, Mod pulm hypertension  - clinically improved  - monitor I/o, daily weights, restrict fluids  - c/w  PO lasix 40 mg q12  - c/w lisinopril, metoprolol    # Acute on chronic hypoxic resp failure sec to COPD exacerbation sec to RSV infection  - blood Culture Results: No growth to date. (02.18.20 @ 14:50)  - RSV Result: Positive (02.18.20 @ 15:00)  - c/w prednisone taper  - contact isolation  - c/w Duoneb, budesonide  - maintain oxygen sats>92    # H/o CVA with residual weakness  - C/w ASA, plavix, statin    # Hypertension - controlled  - c/w lasix, lisinopril, metoprolol, norvasc    # DM type 2  - monitor FS  - c/w  lantus, lispro    # H/o depression/anxiety   - c/w home meds    # DVT prophylaxis    # Full code        #Pending: PT consult    Disposition: Pt may be discharged home with care if she is able to ambulate safely with rollator      I witnessed pt ambulate with rollator - she had a steady gait and denied dyspnea.  Aide at her side.   She may go home with homecare.    Medication reconciliation reviewed. This is a 53 y/o woman with PMH of CVA (multiple),CHF, COPD on home O2, NSTEMI, T2DM, HTN, hyperlipidemia and Anxiety/Depression who presented with dyspnea. She was not feeling well and was finding it increasingly more difficult to breath since the morning of presentation. She also complained of subjective fever, sinus pressure, productive cough (green sputum), and wheezing, which prompted her to come to the ED for further evaluation.     # Acute on chronic systolic CHF, mild to mod MR, mild to mod TR, Mod pulm hypertension  - clinically improved  - monitor I/o, daily weights, restrict fluids  - c/w  PO lasix 40 mg q12  - c/w lisinopril, metoprolol    # Acute on chronic hypoxic resp failure sec to COPD exacerbation sec to RSV infection  - blood Culture Results: No growth to date. (02.18.20 @ 14:50)  - RSV Result: Positive (02.18.20 @ 15:00)  - c/w prednisone taper  - contact isolation  - c/w Duoneb, budesonide  - maintain oxygen sats>92    # H/o CVA with residual weakness  - C/w ASA, plavix, statin    # Hypertension - controlled  - c/w lasix, lisinopril, metoprolol, norvasc    # DM type 2  - monitor FS  - c/w  lantus, lispro    # H/o depression/anxiety   - c/w home meds    # DVT prophylaxis    # Full code        #Pending: PT consult    Disposition: Pt may be discharged home with care if she is able to ambulate safely with rollator    Addendum:  I witnessed pt ambulate with rollator - she had a steady gait and denied dyspnea.  Aide at her side.   She may go home with homecare.    Medication reconciliation reviewed.    Spent 40 minutes in the discharge process of this pt    Addendum:  Discussed case with pt's PMD Dr. Charles today.  ECHO 2018: EF 45%  Pt is actively smoking and uses marijuana.  Very noncompliant per PMD  At high risk for readmission due to diet and medical noncompliance.   Smoking cessation discussed with pt already.  Dr. Charles will sent up cardiology f/u as outpt.

## 2020-02-24 NOTE — DISCHARGE NOTE NURSING/CASE MANAGEMENT/SOCIAL WORK - PATIENT PORTAL LINK FT
You can access the FollowMyHealth Patient Portal offered by [facility] by registering at the following website: [email or web address]. By joining [facility]’s FollowMyHealth portal, you will also be able to view your health information using other applications (apps) compatible with our system.

## 2020-02-24 NOTE — PROGRESS NOTE ADULT - SUBJECTIVE AND OBJECTIVE BOX
USMAN WILLIAN  54y Female    INTERVAL HPI/OVERNIGHT EVENTS:    No complaints. Has a cough.  Otherwise feels well and wants to go home.  NO fever.  States she ambulates with a rollator at home.  Spoke to  and pt's HHA today.    T(F): 98.1 (02-24-20 @ 05:25), Max: 98.6 (02-23-20 @ 22:03)  HR: 58 (02-24-20 @ 05:25) (58 - 64)  BP: 127/77 (02-24-20 @ 05:25) (117/69 - 144/76)  RR: 20 (02-24-20 @ 05:25) (20 - 20)  SpO2: 99% (02-24-20 @ 05:25) (98% - 99%) on O2 NC    I&O's Summary    23 Feb 2020 07:01  -  24 Feb 2020 07:00  --------------------------------------------------------  IN: 0 mL / OUT: 800 mL / NET: -800 mL      CAPILLARY BLOOD GLUCOSE      POCT Blood Glucose.: 180 mg/dL (24 Feb 2020 11:37)  POCT Blood Glucose.: 163 mg/dL (24 Feb 2020 07:51)  POCT Blood Glucose.: 146 mg/dL (23 Feb 2020 21:38)  POCT Blood Glucose.: 224 mg/dL (23 Feb 2020 16:52)        PHYSICAL EXAM:  GENERAL: NAD  HEAD:  Normocephalic  EYES:  conjunctiva and sclera clear  ENMT: Moist mucous membranes  NECK: Supple  NERVOUS SYSTEM:  Alert, awake, Good concentration  CHEST/LUNG: some wheezing  HEART: Regular rate and rhythm  ABDOMEN: Soft, Nontender, Nondistended  EXTREMITIES:   No edema      Consultant(s) Notes Reviewed:  [x ] YES  [ ] NO  Care Discussed with Consultants/Other Providers [ x] YES  [ ] NO    MEDICATIONS  (STANDING):  albuterol/ipratropium for Nebulization 3 milliLiter(s) Nebulizer every 4 hours  amLODIPine   Tablet 10 milliGRAM(s) Oral daily  aspirin enteric coated 81 milliGRAM(s) Oral daily  atorvastatin 80 milliGRAM(s) Oral at bedtime  budesonide 160 MICROgram(s)/formoterol 4.5 MICROgram(s) Inhaler 2 Puff(s) Inhalation two times a day  busPIRone 30 milliGRAM(s) Oral two times a day  chlorhexidine 4% Liquid 1 Application(s) Topical <User Schedule>  clopidogrel Tablet 75 milliGRAM(s) Oral daily  dextrose 5%. 1000 milliLiter(s) (50 mL/Hr) IV Continuous <Continuous>  dextrose 50% Injectable 12.5 Gram(s) IV Push once  dextrose 50% Injectable 25 Gram(s) IV Push once  dextrose 50% Injectable 25 Gram(s) IV Push once  enoxaparin Injectable 40 milliGRAM(s) SubCutaneous at bedtime  furosemide    Tablet 40 milliGRAM(s) Oral two times a day  insulin glargine Injectable (LANTUS) 7 Unit(s) SubCutaneous at bedtime  insulin lispro (HumaLOG) corrective regimen sliding scale   SubCutaneous three times a day before meals  insulin lispro Injectable (HumaLOG) 5 Unit(s) SubCutaneous three times a day before meals  lisinopril 40 milliGRAM(s) Oral daily  metoprolol tartrate 50 milliGRAM(s) Oral two times a day  oxybutynin 5 milliGRAM(s) Oral two times a day  pantoprazole    Tablet 40 milliGRAM(s) Oral before breakfast  QUEtiapine 100 milliGRAM(s) Oral at bedtime  risperiDONE   Tablet 1 milliGRAM(s) Oral daily  risperiDONE   Tablet 3 milliGRAM(s) Oral <User Schedule>  senna 2 Tablet(s) Oral at bedtime    MEDICATIONS  (PRN):  acetaminophen   Tablet .. 650 milliGRAM(s) Oral every 6 hours PRN Temp greater or equal to 38C (100.4F)  dextrose 40% Gel 15 Gram(s) Oral once PRN Blood Glucose LESS THAN 70 milliGRAM(s)/deciliter  glucagon  Injectable 1 milliGRAM(s) IntraMuscular once PRN Glucose LESS THAN 70 milligrams/deciliter      LABS:                        12.3   8.75  )-----------( 259      ( 23 Feb 2020 08:46 )             39.3     02-23    144  |  102  |  32<H>  ----------------------------<  172<H>  3.8   |  29  |  0.8    Ca    9.1      23 Feb 2020 08:46  Mg     1.9     02-23            RADIOLOGY & ADDITIONAL TESTS:    Imaging or report Personally Reviewed:  [x ] YES  [ ] NO    < from: Transthoracic Echocardiogram (02.19.20 @ 11:05) >  Summary:   1. Moderately decreased segmental left ventricular systolic function.   2. LV Ejection Fraction by Urena's Method with a biplane EF of 38 %.   3. Spectral Doppler shows pseudonormal pattern of left ventricular myocardial filling (Grade IIdiastolic dysfunction).   4. Mild to moderate mitral valve regurgitation.   5. The mitral valve leaflets are tethered which is due to reduced systolic function and elevated LVDP.   6. Mild-moderate tricuspid regurgitation.   7. Mild aortic regurgitation.   8. Sclerotic aortic valve with normal opening.   9. Estimated pulmonary artery systolic pressure is 59.7 mmHg assuming a right atrial pressure of 15 mmHg, which is consistent with moderate pulmonary hypertension.    < end of copied text >      Case discussed with residents    Care discussed with pt and aide/family Discharge note    WILLIAN MARY  54y Female    INTERVAL HPI/OVERNIGHT EVENTS:    No complaints. Has a cough.  Otherwise feels well and wants to go home.  NO fever.  States she ambulates with a rollator at home.  Spoke to  and pt's HHA today.    T(F): 98.1 (02-24-20 @ 05:25), Max: 98.6 (02-23-20 @ 22:03)  HR: 58 (02-24-20 @ 05:25) (58 - 64)  BP: 127/77 (02-24-20 @ 05:25) (117/69 - 144/76)  RR: 20 (02-24-20 @ 05:25) (20 - 20)  SpO2: 99% (02-24-20 @ 05:25) (98% - 99%) on O2 NC    I&O's Summary    23 Feb 2020 07:01  -  24 Feb 2020 07:00  --------------------------------------------------------  IN: 0 mL / OUT: 800 mL / NET: -800 mL      CAPILLARY BLOOD GLUCOSE      POCT Blood Glucose.: 180 mg/dL (24 Feb 2020 11:37)  POCT Blood Glucose.: 163 mg/dL (24 Feb 2020 07:51)  POCT Blood Glucose.: 146 mg/dL (23 Feb 2020 21:38)  POCT Blood Glucose.: 224 mg/dL (23 Feb 2020 16:52)        PHYSICAL EXAM:  GENERAL: NAD  HEAD:  Normocephalic  EYES:  conjunctiva and sclera clear  ENMT: Moist mucous membranes  NECK: Supple  NERVOUS SYSTEM:  Alert, awake, Good concentration  CHEST/LUNG: some wheezing  HEART: Regular rate and rhythm  ABDOMEN: Soft, Nontender, Nondistended  EXTREMITIES:   No edema      Consultant(s) Notes Reviewed:  [x ] YES  [ ] NO  Care Discussed with Consultants/Other Providers [ x] YES  [ ] NO    MEDICATIONS  (STANDING):  albuterol/ipratropium for Nebulization 3 milliLiter(s) Nebulizer every 4 hours  amLODIPine   Tablet 10 milliGRAM(s) Oral daily  aspirin enteric coated 81 milliGRAM(s) Oral daily  atorvastatin 80 milliGRAM(s) Oral at bedtime  budesonide 160 MICROgram(s)/formoterol 4.5 MICROgram(s) Inhaler 2 Puff(s) Inhalation two times a day  busPIRone 30 milliGRAM(s) Oral two times a day  chlorhexidine 4% Liquid 1 Application(s) Topical <User Schedule>  clopidogrel Tablet 75 milliGRAM(s) Oral daily  dextrose 5%. 1000 milliLiter(s) (50 mL/Hr) IV Continuous <Continuous>  dextrose 50% Injectable 12.5 Gram(s) IV Push once  dextrose 50% Injectable 25 Gram(s) IV Push once  dextrose 50% Injectable 25 Gram(s) IV Push once  enoxaparin Injectable 40 milliGRAM(s) SubCutaneous at bedtime  furosemide    Tablet 40 milliGRAM(s) Oral two times a day  insulin glargine Injectable (LANTUS) 7 Unit(s) SubCutaneous at bedtime  insulin lispro (HumaLOG) corrective regimen sliding scale   SubCutaneous three times a day before meals  insulin lispro Injectable (HumaLOG) 5 Unit(s) SubCutaneous three times a day before meals  lisinopril 40 milliGRAM(s) Oral daily  metoprolol tartrate 50 milliGRAM(s) Oral two times a day  oxybutynin 5 milliGRAM(s) Oral two times a day  pantoprazole    Tablet 40 milliGRAM(s) Oral before breakfast  QUEtiapine 100 milliGRAM(s) Oral at bedtime  risperiDONE   Tablet 1 milliGRAM(s) Oral daily  risperiDONE   Tablet 3 milliGRAM(s) Oral <User Schedule>  senna 2 Tablet(s) Oral at bedtime    MEDICATIONS  (PRN):  acetaminophen   Tablet .. 650 milliGRAM(s) Oral every 6 hours PRN Temp greater or equal to 38C (100.4F)  dextrose 40% Gel 15 Gram(s) Oral once PRN Blood Glucose LESS THAN 70 milliGRAM(s)/deciliter  glucagon  Injectable 1 milliGRAM(s) IntraMuscular once PRN Glucose LESS THAN 70 milligrams/deciliter      LABS:                        12.3   8.75  )-----------( 259      ( 23 Feb 2020 08:46 )             39.3     02-23    144  |  102  |  32<H>  ----------------------------<  172<H>  3.8   |  29  |  0.8    Ca    9.1      23 Feb 2020 08:46  Mg     1.9     02-23            RADIOLOGY & ADDITIONAL TESTS:    Imaging or report Personally Reviewed:  [x ] YES  [ ] NO    < from: Transthoracic Echocardiogram (02.19.20 @ 11:05) >  Summary:   1. Moderately decreased segmental left ventricular systolic function.   2. LV Ejection Fraction by Urena's Method with a biplane EF of 38 %.   3. Spectral Doppler shows pseudonormal pattern of left ventricular myocardial filling (Grade IIdiastolic dysfunction).   4. Mild to moderate mitral valve regurgitation.   5. The mitral valve leaflets are tethered which is due to reduced systolic function and elevated LVDP.   6. Mild-moderate tricuspid regurgitation.   7. Mild aortic regurgitation.   8. Sclerotic aortic valve with normal opening.   9. Estimated pulmonary artery systolic pressure is 59.7 mmHg assuming a right atrial pressure of 15 mmHg, which is consistent with moderate pulmonary hypertension.    < end of copied text >      Case discussed with residents    Care discussed with pt and aide/family

## 2020-02-24 NOTE — PROGRESS NOTE ADULT - SUBJECTIVE AND OBJECTIVE BOX
WILLIAN MARY 54y Female  MRN#: [id number]     SUBJECTIVE  Patient is a 54y old Female who presents with a chief complaint of CHF Exacerbation; COPD Exacerbation due to RSV infection; Elevated Troponin (2020 12:51)  Currently admitted to medicine with the primary diagnosis of CHF exacerbation  Today is hospital day 6d, and this morning she is well. She says she feels ready to go home. She has not walked    OBJECTIVE  PAST MEDICAL & SURGICAL HISTORY  CKD (chronic kidney disease), stage II  Type 2 diabetes mellitus  Cerebrovascular accident (CVA): Multiple  CHF (congestive heart failure)  COPD, severe  Anxiety and depression  Hyperlipidemia  Hypertension  No significant past surgical history    ALLERGIES:  No Known Allergies    MEDICATIONS:  STANDING MEDICATIONS  albuterol/ipratropium for Nebulization 3 milliLiter(s) Nebulizer every 4 hours  amLODIPine   Tablet 10 milliGRAM(s) Oral daily  aspirin enteric coated 81 milliGRAM(s) Oral daily  atorvastatin 80 milliGRAM(s) Oral at bedtime  budesonide 160 MICROgram(s)/formoterol 4.5 MICROgram(s) Inhaler 2 Puff(s) Inhalation two times a day  busPIRone 30 milliGRAM(s) Oral two times a day  chlorhexidine 4% Liquid 1 Application(s) Topical <User Schedule>  clopidogrel Tablet 75 milliGRAM(s) Oral daily  dextrose 5%. 1000 milliLiter(s) IV Continuous <Continuous>  dextrose 50% Injectable 12.5 Gram(s) IV Push once  dextrose 50% Injectable 25 Gram(s) IV Push once  dextrose 50% Injectable 25 Gram(s) IV Push once  enoxaparin Injectable 40 milliGRAM(s) SubCutaneous at bedtime  furosemide    Tablet 40 milliGRAM(s) Oral two times a day  insulin glargine Injectable (LANTUS) 7 Unit(s) SubCutaneous at bedtime  insulin lispro (HumaLOG) corrective regimen sliding scale   SubCutaneous three times a day before meals  insulin lispro Injectable (HumaLOG) 5 Unit(s) SubCutaneous three times a day before meals  lisinopril 40 milliGRAM(s) Oral daily  metoprolol tartrate 50 milliGRAM(s) Oral two times a day  oxybutynin 5 milliGRAM(s) Oral two times a day  pantoprazole    Tablet 40 milliGRAM(s) Oral before breakfast  QUEtiapine 100 milliGRAM(s) Oral at bedtime  risperiDONE   Tablet 1 milliGRAM(s) Oral daily  risperiDONE   Tablet 3 milliGRAM(s) Oral <User Schedule>  senna 2 Tablet(s) Oral at bedtime    PRN MEDICATIONS  acetaminophen   Tablet .. 650 milliGRAM(s) Oral every 6 hours PRN  dextrose 40% Gel 15 Gram(s) Oral once PRN  glucagon  Injectable 1 milliGRAM(s) IntraMuscular once PRN      VITAL SIGNS: Last 24 Hours  T(C): 36.7 (2020 05:25), Max: 37 (2020 22:03)  T(F): 98.1 (2020 05:25), Max: 98.6 (2020 22:03)  HR: 58 (2020 05:25) (58 - 64)  BP: 127/77 (2020 05:25) (117/69 - 144/76)  BP(mean): --  RR: 20 (2020 05:25) (20 - 20)  SpO2: 99% (2020 05:25) (98% - 99%)    LABS:  No labs for today    PHYSICAL EXAM:  GENERAL: NAD, well-developed, AAOx3  HEENT:  Atraumatic, Normocephalic. EOMI, PERRLA, conjunctiva and sclera clear, No JVD  PULMONARY: mild expiratory wheezing, mild bibasilar crackles.   CARDIOVASCULAR: Regular rate and rhythm; No murmurs, rubs, or gallops  GASTROINTESTINAL: Soft, Nontender, Nondistended; Bowel sounds present  MUSCULOSKELETAL:  2+ Peripheral Pulses, No clubbing, cyanosis, or edema  NEUROLOGY: non-focal  SKIN: No rashes or lesions       ASSESSMENT & PLAN  54 year old female with a history of multiple CVAs, HFrEF (new diagnosis, ef=38%), DMII, HTN. Patient is presenting for shortness of breath and fever. She was found to have RSV positive. Admitted for COPD and CHF exaccerbation secondary to RSV    # HFrEF, acute CHF exaccerbation precipitated by viral infection  - Newly established HFrEF (previous ef=55% in 2016, now 38%) with signs of volume overload on admission.   - on Lasix 40mg IV BID. Will switch to oral 40mg BID. Home dose =40mg once daily  - Lasix 40mg once daily  - I and O, today net balance = - 1.3L  - on ACE-I and BB  - f/u cxr     # COPD Exacerbation secondary to RSV infection:  - on home oxygen 4L. Continue 4L o2 here  - Started on Symbicort; Duonebs standing Q4H;  - prednisone 40mg daily for 5 days    #constipation  - likley due to decreased ambulation  - will start lactulose until she was a bowel movement.    #Hx of Multiple CVA (8x) with mild left sided weakness  - on aspirin and plavix and statin     #Hx of DMII,   - HbA1c = 6.6%  - FS better controlled compared to yesterday but still not optimal. Will increase lispro    #Possible CKD, GFR=85%  - stable Cr    # Hx of HTN  - bp well controlled on amlodipine and lisinopril     # Hx of HLD  - on statin     # Hx of Anxiety and Depression  - on Seroquel, buspirone and risperidone     #current smoker      DVT prophylaxis: Lovenox  Diet: Diet, DASH/TmL Fluid Restriction   Dispo: home  Ambulation: as tolerated. Patient uses a rolling walker at baseline

## 2020-02-28 DIAGNOSIS — N18.2 CHRONIC KIDNEY DISEASE, STAGE 2 (MILD): ICD-10-CM

## 2020-02-28 DIAGNOSIS — F41.9 ANXIETY DISORDER, UNSPECIFIED: ICD-10-CM

## 2020-02-28 DIAGNOSIS — I13.0 HYPERTENSIVE HEART AND CHRONIC KIDNEY DISEASE WITH HEART FAILURE AND STAGE 1 THROUGH STAGE 4 CHRONIC KIDNEY DISEASE, OR UNSPECIFIED CHRONIC KIDNEY DISEASE: ICD-10-CM

## 2020-02-28 DIAGNOSIS — F17.210 NICOTINE DEPENDENCE, CIGARETTES, UNCOMPLICATED: ICD-10-CM

## 2020-02-28 DIAGNOSIS — J96.21 ACUTE AND CHRONIC RESPIRATORY FAILURE WITH HYPOXIA: ICD-10-CM

## 2020-02-28 DIAGNOSIS — K59.00 CONSTIPATION, UNSPECIFIED: ICD-10-CM

## 2020-02-28 DIAGNOSIS — I07.1 RHEUMATIC TRICUSPID INSUFFICIENCY: ICD-10-CM

## 2020-02-28 DIAGNOSIS — I50.23 ACUTE ON CHRONIC SYSTOLIC (CONGESTIVE) HEART FAILURE: ICD-10-CM

## 2020-02-28 DIAGNOSIS — J21.0 ACUTE BRONCHIOLITIS DUE TO RESPIRATORY SYNCYTIAL VIRUS: ICD-10-CM

## 2020-02-28 DIAGNOSIS — I34.0 NONRHEUMATIC MITRAL (VALVE) INSUFFICIENCY: ICD-10-CM

## 2020-02-28 DIAGNOSIS — J44.1 CHRONIC OBSTRUCTIVE PULMONARY DISEASE WITH (ACUTE) EXACERBATION: ICD-10-CM

## 2020-02-28 DIAGNOSIS — F32.9 MAJOR DEPRESSIVE DISORDER, SINGLE EPISODE, UNSPECIFIED: ICD-10-CM

## 2020-02-28 DIAGNOSIS — Z79.899 OTHER LONG TERM (CURRENT) DRUG THERAPY: ICD-10-CM

## 2020-02-28 DIAGNOSIS — E11.22 TYPE 2 DIABETES MELLITUS WITH DIABETIC CHRONIC KIDNEY DISEASE: ICD-10-CM

## 2020-02-28 DIAGNOSIS — I69.954 HEMIPLEGIA AND HEMIPARESIS FOLLOWING UNSPECIFIED CEREBROVASCULAR DISEASE AFFECTING LEFT NON-DOMINANT SIDE: ICD-10-CM

## 2020-02-28 DIAGNOSIS — E78.5 HYPERLIPIDEMIA, UNSPECIFIED: ICD-10-CM

## 2020-02-28 DIAGNOSIS — I50.21 ACUTE SYSTOLIC (CONGESTIVE) HEART FAILURE: ICD-10-CM

## 2020-02-28 DIAGNOSIS — I27.20 PULMONARY HYPERTENSION, UNSPECIFIED: ICD-10-CM

## 2020-06-08 ENCOUNTER — INPATIENT (INPATIENT)
Facility: HOSPITAL | Age: 55
LOS: 2 days | Discharge: HOME HEALTH | End: 2020-06-11
Attending: INTERNAL MEDICINE | Admitting: INTERNAL MEDICINE
Payer: MEDICARE

## 2020-06-08 VITALS
RESPIRATION RATE: 18 BRPM | OXYGEN SATURATION: 95 % | HEART RATE: 98 BPM | SYSTOLIC BLOOD PRESSURE: 167 MMHG | TEMPERATURE: 98 F | DIASTOLIC BLOOD PRESSURE: 67 MMHG

## 2020-06-08 PROBLEM — F41.9 ANXIETY DISORDER, UNSPECIFIED: Chronic | Status: ACTIVE | Noted: 2020-02-18

## 2020-06-08 PROBLEM — I10 ESSENTIAL (PRIMARY) HYPERTENSION: Chronic | Status: ACTIVE | Noted: 2020-02-18

## 2020-06-08 PROBLEM — I50.9 HEART FAILURE, UNSPECIFIED: Chronic | Status: ACTIVE | Noted: 2020-02-18

## 2020-06-08 PROBLEM — J44.9 CHRONIC OBSTRUCTIVE PULMONARY DISEASE, UNSPECIFIED: Chronic | Status: ACTIVE | Noted: 2020-02-18

## 2020-06-08 PROBLEM — E78.5 HYPERLIPIDEMIA, UNSPECIFIED: Chronic | Status: ACTIVE | Noted: 2020-02-18

## 2020-06-08 PROBLEM — N18.2 CHRONIC KIDNEY DISEASE, STAGE 2 (MILD): Chronic | Status: ACTIVE | Noted: 2020-02-18

## 2020-06-08 PROBLEM — E11.9 TYPE 2 DIABETES MELLITUS WITHOUT COMPLICATIONS: Chronic | Status: ACTIVE | Noted: 2020-02-18

## 2020-06-08 PROBLEM — I63.9 CEREBRAL INFARCTION, UNSPECIFIED: Chronic | Status: ACTIVE | Noted: 2020-02-18

## 2020-06-08 LAB
ALBUMIN SERPL ELPH-MCNC: 3.5 G/DL — SIGNIFICANT CHANGE UP (ref 3.5–5.2)
ALP SERPL-CCNC: 67 U/L — SIGNIFICANT CHANGE UP (ref 30–115)
ALT FLD-CCNC: 10 U/L — SIGNIFICANT CHANGE UP (ref 0–41)
ANION GAP SERPL CALC-SCNC: 13 MMOL/L — SIGNIFICANT CHANGE UP (ref 7–14)
AST SERPL-CCNC: 13 U/L — SIGNIFICANT CHANGE UP (ref 0–41)
BASE EXCESS BLDV CALC-SCNC: 4.2 MMOL/L — HIGH (ref -2–2)
BASOPHILS # BLD AUTO: 0.05 K/UL — SIGNIFICANT CHANGE UP (ref 0–0.2)
BASOPHILS NFR BLD AUTO: 0.6 % — SIGNIFICANT CHANGE UP (ref 0–1)
BILIRUB SERPL-MCNC: 0.8 MG/DL — SIGNIFICANT CHANGE UP (ref 0.2–1.2)
BUN SERPL-MCNC: 10 MG/DL — SIGNIFICANT CHANGE UP (ref 10–20)
CA-I SERPL-SCNC: 1.19 MMOL/L — SIGNIFICANT CHANGE UP (ref 1.12–1.3)
CALCIUM SERPL-MCNC: 9.1 MG/DL — SIGNIFICANT CHANGE UP (ref 8.5–10.1)
CHLORIDE SERPL-SCNC: 106 MMOL/L — SIGNIFICANT CHANGE UP (ref 98–110)
CO2 SERPL-SCNC: 25 MMOL/L — SIGNIFICANT CHANGE UP (ref 17–32)
CREAT SERPL-MCNC: 0.7 MG/DL — SIGNIFICANT CHANGE UP (ref 0.7–1.5)
EOSINOPHIL # BLD AUTO: 0.1 K/UL — SIGNIFICANT CHANGE UP (ref 0–0.7)
EOSINOPHIL NFR BLD AUTO: 1.2 % — SIGNIFICANT CHANGE UP (ref 0–8)
GAS PNL BLDV: 140 MMOL/L — SIGNIFICANT CHANGE UP (ref 136–145)
GAS PNL BLDV: SIGNIFICANT CHANGE UP
GLUCOSE BLDC GLUCOMTR-MCNC: 128 MG/DL — HIGH (ref 70–99)
GLUCOSE BLDC GLUCOMTR-MCNC: 174 MG/DL — HIGH (ref 70–99)
GLUCOSE SERPL-MCNC: 132 MG/DL — HIGH (ref 70–99)
HCO3 BLDV-SCNC: 30 MMOL/L — HIGH (ref 22–29)
HCT VFR BLD CALC: 36.3 % — LOW (ref 37–47)
HCT VFR BLDA CALC: 35.6 % — SIGNIFICANT CHANGE UP (ref 34–44)
HGB BLD CALC-MCNC: 11.6 G/DL — LOW (ref 14–18)
HGB BLD-MCNC: 11.1 G/DL — LOW (ref 12–16)
IMM GRANULOCYTES NFR BLD AUTO: 0.5 % — HIGH (ref 0.1–0.3)
LACTATE BLDV-MCNC: 1.2 MMOL/L — SIGNIFICANT CHANGE UP (ref 0.5–1.6)
LYMPHOCYTES # BLD AUTO: 0.98 K/UL — LOW (ref 1.2–3.4)
LYMPHOCYTES # BLD AUTO: 11.6 % — LOW (ref 20.5–51.1)
MAGNESIUM SERPL-MCNC: 1.6 MG/DL — LOW (ref 1.8–2.4)
MCHC RBC-ENTMCNC: 27.5 PG — SIGNIFICANT CHANGE UP (ref 27–31)
MCHC RBC-ENTMCNC: 30.6 G/DL — LOW (ref 32–37)
MCV RBC AUTO: 89.9 FL — SIGNIFICANT CHANGE UP (ref 81–99)
MONOCYTES # BLD AUTO: 0.31 K/UL — SIGNIFICANT CHANGE UP (ref 0.1–0.6)
MONOCYTES NFR BLD AUTO: 3.7 % — SIGNIFICANT CHANGE UP (ref 1.7–9.3)
NEUTROPHILS # BLD AUTO: 6.98 K/UL — HIGH (ref 1.4–6.5)
NEUTROPHILS NFR BLD AUTO: 82.4 % — HIGH (ref 42.2–75.2)
NRBC # BLD: 0 /100 WBCS — SIGNIFICANT CHANGE UP (ref 0–0)
NT-PROBNP SERPL-SCNC: 2022 PG/ML — HIGH (ref 0–300)
PCO2 BLDV: 51 MMHG — SIGNIFICANT CHANGE UP (ref 41–51)
PH BLDV: 7.38 — SIGNIFICANT CHANGE UP (ref 7.26–7.43)
PLATELET # BLD AUTO: 253 K/UL — SIGNIFICANT CHANGE UP (ref 130–400)
PO2 BLDV: 35 MMHG — SIGNIFICANT CHANGE UP (ref 20–40)
POTASSIUM BLDV-SCNC: 4.7 MMOL/L — SIGNIFICANT CHANGE UP (ref 3.3–5.6)
POTASSIUM SERPL-MCNC: 4.6 MMOL/L — SIGNIFICANT CHANGE UP (ref 3.5–5)
POTASSIUM SERPL-SCNC: 4.6 MMOL/L — SIGNIFICANT CHANGE UP (ref 3.5–5)
PROT SERPL-MCNC: 6.1 G/DL — SIGNIFICANT CHANGE UP (ref 6–8)
RBC # BLD: 4.04 M/UL — LOW (ref 4.2–5.4)
RBC # FLD: 17.4 % — HIGH (ref 11.5–14.5)
SAO2 % BLDV: 63 % — SIGNIFICANT CHANGE UP
SODIUM SERPL-SCNC: 144 MMOL/L — SIGNIFICANT CHANGE UP (ref 135–146)
TROPONIN T SERPL-MCNC: <0.01 NG/ML — SIGNIFICANT CHANGE UP
WBC # BLD: 8.46 K/UL — SIGNIFICANT CHANGE UP (ref 4.8–10.8)
WBC # FLD AUTO: 8.46 K/UL — SIGNIFICANT CHANGE UP (ref 4.8–10.8)

## 2020-06-08 PROCEDURE — 71045 X-RAY EXAM CHEST 1 VIEW: CPT | Mod: 26

## 2020-06-08 PROCEDURE — 99223 1ST HOSP IP/OBS HIGH 75: CPT

## 2020-06-08 PROCEDURE — 93970 EXTREMITY STUDY: CPT | Mod: 26

## 2020-06-08 PROCEDURE — 99285 EMERGENCY DEPT VISIT HI MDM: CPT

## 2020-06-08 PROCEDURE — 93010 ELECTROCARDIOGRAM REPORT: CPT

## 2020-06-08 RX ORDER — LISINOPRIL 2.5 MG/1
40 TABLET ORAL DAILY
Refills: 0 | Status: DISCONTINUED | OUTPATIENT
Start: 2020-06-08 | End: 2020-06-11

## 2020-06-08 RX ORDER — AMLODIPINE BESYLATE 2.5 MG/1
10 TABLET ORAL DAILY
Refills: 0 | Status: DISCONTINUED | OUTPATIENT
Start: 2020-06-08 | End: 2020-06-11

## 2020-06-08 RX ORDER — QUETIAPINE FUMARATE 200 MG/1
100 TABLET, FILM COATED ORAL AT BEDTIME
Refills: 0 | Status: DISCONTINUED | OUTPATIENT
Start: 2020-06-08 | End: 2020-06-11

## 2020-06-08 RX ORDER — MAGNESIUM SULFATE 500 MG/ML
2 VIAL (ML) INJECTION ONCE
Refills: 0 | Status: COMPLETED | OUTPATIENT
Start: 2020-06-08 | End: 2020-06-08

## 2020-06-08 RX ORDER — FUROSEMIDE 40 MG
40 TABLET ORAL ONCE
Refills: 0 | Status: COMPLETED | OUTPATIENT
Start: 2020-06-08 | End: 2020-06-08

## 2020-06-08 RX ORDER — ENOXAPARIN SODIUM 100 MG/ML
40 INJECTION SUBCUTANEOUS AT BEDTIME
Refills: 0 | Status: DISCONTINUED | OUTPATIENT
Start: 2020-06-08 | End: 2020-06-11

## 2020-06-08 RX ORDER — FUROSEMIDE 40 MG
40 TABLET ORAL EVERY 12 HOURS
Refills: 0 | Status: DISCONTINUED | OUTPATIENT
Start: 2020-06-08 | End: 2020-06-11

## 2020-06-08 RX ORDER — DEXTROSE 50 % IN WATER 50 %
25 SYRINGE (ML) INTRAVENOUS ONCE
Refills: 0 | Status: DISCONTINUED | OUTPATIENT
Start: 2020-06-08 | End: 2020-06-11

## 2020-06-08 RX ORDER — ASPIRIN/CALCIUM CARB/MAGNESIUM 324 MG
81 TABLET ORAL DAILY
Refills: 0 | Status: DISCONTINUED | OUTPATIENT
Start: 2020-06-08 | End: 2020-06-11

## 2020-06-08 RX ORDER — ALBUTEROL 90 UG/1
2 AEROSOL, METERED ORAL EVERY 6 HOURS
Refills: 0 | Status: DISCONTINUED | OUTPATIENT
Start: 2020-06-08 | End: 2020-06-11

## 2020-06-08 RX ORDER — SODIUM CHLORIDE 9 MG/ML
1000 INJECTION, SOLUTION INTRAVENOUS
Refills: 0 | Status: DISCONTINUED | OUTPATIENT
Start: 2020-06-08 | End: 2020-06-11

## 2020-06-08 RX ORDER — RISPERIDONE 4 MG/1
1 TABLET ORAL
Refills: 0 | Status: DISCONTINUED | OUTPATIENT
Start: 2020-06-08 | End: 2020-06-11

## 2020-06-08 RX ORDER — FENOFIBRATE,MICRONIZED 130 MG
145 CAPSULE ORAL DAILY
Refills: 0 | Status: DISCONTINUED | OUTPATIENT
Start: 2020-06-08 | End: 2020-06-11

## 2020-06-08 RX ORDER — NICOTINE POLACRILEX 2 MG
1 GUM BUCCAL DAILY
Refills: 0 | Status: DISCONTINUED | OUTPATIENT
Start: 2020-06-08 | End: 2020-06-11

## 2020-06-08 RX ORDER — PANTOPRAZOLE SODIUM 20 MG/1
40 TABLET, DELAYED RELEASE ORAL
Refills: 0 | Status: DISCONTINUED | OUTPATIENT
Start: 2020-06-08 | End: 2020-06-11

## 2020-06-08 RX ORDER — CLOPIDOGREL BISULFATE 75 MG/1
75 TABLET, FILM COATED ORAL DAILY
Refills: 0 | Status: DISCONTINUED | OUTPATIENT
Start: 2020-06-08 | End: 2020-06-11

## 2020-06-08 RX ORDER — ATORVASTATIN CALCIUM 80 MG/1
80 TABLET, FILM COATED ORAL AT BEDTIME
Refills: 0 | Status: DISCONTINUED | OUTPATIENT
Start: 2020-06-08 | End: 2020-06-11

## 2020-06-08 RX ORDER — DEXTROSE 50 % IN WATER 50 %
12.5 SYRINGE (ML) INTRAVENOUS ONCE
Refills: 0 | Status: DISCONTINUED | OUTPATIENT
Start: 2020-06-08 | End: 2020-06-11

## 2020-06-08 RX ORDER — ACETAMINOPHEN 500 MG
650 TABLET ORAL EVERY 6 HOURS
Refills: 0 | Status: DISCONTINUED | OUTPATIENT
Start: 2020-06-08 | End: 2020-06-11

## 2020-06-08 RX ORDER — GLUCAGON INJECTION, SOLUTION 0.5 MG/.1ML
1 INJECTION, SOLUTION SUBCUTANEOUS ONCE
Refills: 0 | Status: DISCONTINUED | OUTPATIENT
Start: 2020-06-08 | End: 2020-06-11

## 2020-06-08 RX ORDER — INSULIN LISPRO 100/ML
VIAL (ML) SUBCUTANEOUS
Refills: 0 | Status: DISCONTINUED | OUTPATIENT
Start: 2020-06-08 | End: 2020-06-11

## 2020-06-08 RX ORDER — RISPERIDONE 4 MG/1
3 TABLET ORAL AT BEDTIME
Refills: 0 | Status: DISCONTINUED | OUTPATIENT
Start: 2020-06-08 | End: 2020-06-11

## 2020-06-08 RX ORDER — OXYBUTYNIN CHLORIDE 5 MG
5 TABLET ORAL
Refills: 0 | Status: DISCONTINUED | OUTPATIENT
Start: 2020-06-08 | End: 2020-06-11

## 2020-06-08 RX ORDER — DEXTROSE 50 % IN WATER 50 %
15 SYRINGE (ML) INTRAVENOUS ONCE
Refills: 0 | Status: DISCONTINUED | OUTPATIENT
Start: 2020-06-08 | End: 2020-06-11

## 2020-06-08 RX ORDER — CHLORHEXIDINE GLUCONATE 213 G/1000ML
1 SOLUTION TOPICAL
Refills: 0 | Status: DISCONTINUED | OUTPATIENT
Start: 2020-06-08 | End: 2020-06-11

## 2020-06-08 RX ORDER — METOPROLOL TARTRATE 50 MG
50 TABLET ORAL
Refills: 0 | Status: DISCONTINUED | OUTPATIENT
Start: 2020-06-08 | End: 2020-06-11

## 2020-06-08 RX ADMIN — PANTOPRAZOLE SODIUM 40 MILLIGRAM(S): 20 TABLET, DELAYED RELEASE ORAL at 11:56

## 2020-06-08 RX ADMIN — RISPERIDONE 3 MILLIGRAM(S): 4 TABLET ORAL at 21:05

## 2020-06-08 RX ADMIN — Medication 40 MILLIGRAM(S): at 10:56

## 2020-06-08 RX ADMIN — AMLODIPINE BESYLATE 10 MILLIGRAM(S): 2.5 TABLET ORAL at 11:55

## 2020-06-08 RX ADMIN — ENOXAPARIN SODIUM 40 MILLIGRAM(S): 100 INJECTION SUBCUTANEOUS at 21:11

## 2020-06-08 RX ADMIN — RISPERIDONE 1 MILLIGRAM(S): 4 TABLET ORAL at 11:59

## 2020-06-08 RX ADMIN — QUETIAPINE FUMARATE 100 MILLIGRAM(S): 200 TABLET, FILM COATED ORAL at 21:12

## 2020-06-08 RX ADMIN — Medication 145 MILLIGRAM(S): at 11:55

## 2020-06-08 RX ADMIN — Medication 81 MILLIGRAM(S): at 11:55

## 2020-06-08 RX ADMIN — Medication 50 GRAM(S): at 10:57

## 2020-06-08 RX ADMIN — Medication 1 PATCH: at 19:15

## 2020-06-08 RX ADMIN — ATORVASTATIN CALCIUM 80 MILLIGRAM(S): 80 TABLET, FILM COATED ORAL at 21:07

## 2020-06-08 RX ADMIN — Medication 1 PATCH: at 14:07

## 2020-06-08 RX ADMIN — LISINOPRIL 40 MILLIGRAM(S): 2.5 TABLET ORAL at 11:55

## 2020-06-08 RX ADMIN — CLOPIDOGREL BISULFATE 75 MILLIGRAM(S): 75 TABLET, FILM COATED ORAL at 11:57

## 2020-06-08 RX ADMIN — Medication 5 MILLIGRAM(S): at 17:21

## 2020-06-08 RX ADMIN — Medication 50 MILLIGRAM(S): at 17:22

## 2020-06-08 RX ADMIN — Medication 30 MILLIGRAM(S): at 17:21

## 2020-06-08 RX ADMIN — Medication 40 MILLIGRAM(S): at 21:12

## 2020-06-08 NOTE — H&P ADULT - ASSESSMENT
Ms. 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     # Exacerbation of HF Ms. 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     # Exacerbation of HFrEF  - No Ms. 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     # Exacerbation of HFrEF  - Shortness of breath, volume overload, pulmonary congestion on xray, elevated BNP  - TTE in February 2020, EF: 38%, grade II diastolic dysfunction, moderate pulmonary HTN  - not complaint with home dose of lasix  - Will start lasix 40 mg IV Q 12 hours  - Accurate intake. output daily weight.  - Negative Cardiac cath by Dr. Murphy on 11/8/2018  - Continue metoprolol + lisinopril    # COPD on home O2.  - no wheezing on exam today  - not following with pulmo, not on inhalers treatment  - continue O2 3L/min nasal canula  - albuterol as needed.     # History of CVA with left sided weakness  - continue aspirin + plavix + atrovastatin    # HTN  - history of uncontrolled HTN  - continue lasix + lisinopril + amlodipine    # DL  - continue fenofibrate and atorvastatin Ms. 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     # Exacerbation of HFrEF  - Shortness of breath, volume overload, pulmonary congestion on xray, elevated BNP  - TTE in February 2020, EF: 38%, grade II diastolic dysfunction, moderate pulmonary HTN  - not complaint with home dose of lasix  - Will start lasix 40 mg IV Q 12 hours  - Accurate intake. output daily weight.  - Negative Cardiac cath by Dr. Murphy on 11/8/2018  - Continue metoprolol + lisinopril  - Venous doppler US of LE ordered    # COPD on home O2.  - no wheezing on exam today  - not following with pulmo, not on inhalers treatment  - continue O2 3L/min nasal canula  - albuterol as needed.     # History of CVA with left sided weakness  - continue aspirin + plavix + atrovastatin    # HTN  - history of uncontrolled HTN  - continue lasix + lisinopril + amlodipine    # DL  - continue fenofibrate and atorvastatin Ms. 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     # Exacerbation of HFrEF  - Shortness of breath, volume overload, pulmonary congestion on xray, elevated BNP  - TTE in February 2020, EF: 38%, grade II diastolic dysfunction, moderate pulmonary HTN  - not complaint with home dose of lasix  - Will start lasix 40 mg IV Q 12 hours  - Accurate intake. output daily weight.  - Negative Cardiac cath by Dr. Murphy on 11/8/2018  - Continue metoprolol + lisinopril  - Venous doppler US of LE ordered    # COPD on home O2.  - no wheezing on exam today  - not following with pulmo, not on inhalers treatment  - continue O2 3L/min nasal canula  - albuterol as needed.     # History of CVA with left sided weakness  - continue aspirin + plavix + atrovastatin    # HTN  - history of uncontrolled HTN  - continue lasix + lisinopril + amlodipine    # DM  - Holding oral hypoglycemic  - BS< 180. Monitor and start Lantus if persistently > 180    # DL  - continue fenofibrate and atorvastatin Ms. 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     # Acute on chronic sysstolic Exacerbation of CHF   - Shortness of breath, volume overload, pulmonary congestion on xray, elevated BNP  - TTE in February 2020, EF: 38%, grade II diastolic dysfunction, moderate pulmonary HTN  - not complaint with home dose of lasix  - Will start lasix 40 mg IV Q 12 hours  - Accurate intake. output daily weight.  - Negative Cardiac cath by Dr. Murphy on 11/8/2018  - Continue metoprolol + lisinopril  - Venous doppler US of LE ordered    # COPD on home O2.  - no wheezing on exam today  - not following with pulmo, not on inhalers treatment  - continue O2 3L/min nasal canula  - albuterol as needed.     # History of CVA with left sided weakness  - continue aspirin + plavix + atrovastatin    # HTN  - history of uncontrolled HTN  - continue lasix + lisinopril + amlodipine    # DM  - Holding oral hypoglycemic  - BS< 180. Monitor and start Lantus if persistently > 180    # DL  - continue fenofibrate and atorvastatin

## 2020-06-08 NOTE — ED ADULT NURSE NOTE - PMH
Anxiety and depression    Cerebrovascular accident (CVA)  Multiple  CHF (congestive heart failure)    CKD (chronic kidney disease), stage II    COPD, severe    Hyperlipidemia    Hypertension    Type 2 diabetes mellitus

## 2020-06-08 NOTE — H&P ADULT - NSHPPHYSICALEXAM_GEN_ALL_CORE
Vital Signs Last 24 Hrs  T(C): 36.7 (08 Jun 2020 08:28), Max: 36.7 (08 Jun 2020 08:28)  T(F): 98 (08 Jun 2020 08:28), Max: 98 (08 Jun 2020 08:28)  HR: 98 (08 Jun 2020 08:28) (98 - 98)  BP: 167/67 (08 Jun 2020 08:28) (167/67 - 167/67)  RR: 18 (08 Jun 2020 08:28) (18 - 18)  SpO2: 95% (08 Jun 2020 08:28) (95% - 95%)      PHYSICAL EXAM:  GENERAL: NAD, speaks in full sentences, no signs of respiratory distress, on nasal canula  HEAD:  Atraumatic, Normocephalic  EYES: conjunctiva and sclera clear  NECK: Supple  CHEST/LUNG:  No wheeze; bibasilar crackles; No accessory muscles used  HEART: Regular rate and rhythm;   ABDOMEN: Soft, Nontender, Nondistended; Bowel sounds present; No guarding  EXTREMITIES:  Bilateral pitting edema extending to her knees  PSYCH: AAOx3  NEUROLOGY: left sided weakness   SKIN: No rashes or lesions

## 2020-06-08 NOTE — H&P ADULT - ATTENDING COMMENTS
Patient seen and examined. Agree with above note  Patient feels better after the Lasix.   Continue with at telemetry.   Cardiology evaluation.   Possible d/c in 24 to 48 hours    Need PT eval. history of stroke

## 2020-06-08 NOTE — H&P ADULT - NSHPLABSRESULTS_GEN_ALL_CORE
Labs:                            11.1   8.46  )-----------( 253      ( 08 Jun 2020 09:26 )             36.3       06-08    144  |  106  |  10  ----------------------------<  132<H>  4.6   |  25  |  0.7    Ca    9.1      08 Jun 2020 09:26  Mg     1.6     06-08    TPro  6.1  /  Alb  3.5  /  TBili  0.8  /  DBili  x   /  AST  13  /  ALT  10  /  AlkPhos  67  06-08              Lactate Trend      CARDIAC MARKERS ( 08 Jun 2020 09:26 )  x     / <0.01 ng/mL / x     / x     / x            < from: Xray Chest 1 View-PORTABLE IMMEDIATE (06.08.20 @ 09:49) >    Impression:      Pulmonary vascular congestion with small bilateral effusions and cardiomegaly    < end of copied text >        < from: 12 Lead ECG (06.08.20 @ 10:10) >    Diagnosis Line Normal sinus rhythm  Possible Left atrial enlargement  Left bundle branch block  Abnormal ECG    < end of copied text > Labs:                            11.1   8.46  )-----------( 253      ( 08 Jun 2020 09:26 )             36.3       06-08    144  |  106  |  10  ----------------------------<  132<H>  4.6   |  25  |  0.7    Ca    9.1      08 Jun 2020 09:26  Mg     1.6     06-08    TPro  6.1  /  Alb  3.5  /  TBili  0.8  /  DBili  x   /  AST  13  /  ALT  10  /  AlkPhos  67  06-08              Lactate Trend      CARDIAC MARKERS ( 08 Jun 2020 09:26 )  x     / <0.01 ng/mL / x     / x     / x            < from: Xray Chest 1 View-PORTABLE IMMEDIATE (06.08.20 @ 09:49) >    Impression:      Pulmonary vascular congestion with small bilateral effusions and cardiomegaly    < end of copied text >        < from: 12 Lead ECG (06.08.20 @ 10:10) >    Diagnosis Line Normal sinus rhythm  Possible Left atrial enlargement  Left bundle branch block  Abnormal ECG    < end of copied text >      < from: Transthoracic Echocardiogram (02.19.20 @ 11:05) >    Summary:   1. Moderately decreased segmental left ventricular systolic function.   2. LV Ejection Fraction by Urena's Method with a biplane EF of 38 %.   3. Spectral Doppler shows pseudonormal pattern of left ventricular myocardial filling (Grade IIdiastolic dysfunction).   4. Mild to moderate mitral valve regurgitation.   5. The mitral valve leaflets are tethered which is due to reduced systolic function and elevated LVDP.   6. Mild-moderate tricuspid regurgitation.   7. Mild aortic regurgitation.   8. Sclerotic aortic valve with normal opening.   9. Estimated pulmonary artery systolic pressure is 59.7 mmHg assuming a right atrial pressure of 15 mmHg, which is consistent with moderate pulmonary hypertension.      < end of copied text >

## 2020-06-08 NOTE — H&P ADULT - HISTORY OF PRESENT ILLNESS
Ms. 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     Patient has been complaining of shortness of breath recently, and she was admitted to [facility] for that reason 2 weeks ago, at that time she was tested for COVID-19 and it was negative. Patient denies fever, cough, chills, or chest pain. Denies abdominal pain. She noted that lasix is not working, and she does not taking twice a day as prescribed, and takes it only once a day.     In ED, patient was hypertensive, afebrile, saturating 94% on 3L nc. Chest xray showed Pulmonary vascular congestion with small bilateral effusions and cardiomegaly. BNP elevated. Troponin negative. Patient given lasix 40 mg IV once and admitted to medicine.

## 2020-06-08 NOTE — H&P ADULT - NSICDXPASTMEDICALHX_GEN_ALL_CORE_FT
PAST MEDICAL HISTORY:  Anxiety and depression     Cerebrovascular accident (CVA) Multiple    CHF (congestive heart failure)     CKD (chronic kidney disease), stage II     COPD, severe     Hyperlipidemia     Hypertension     Type 2 diabetes mellitus

## 2020-06-08 NOTE — ED PROVIDER NOTE - OBJECTIVE STATEMENT
56 y/o female with PMH of HTN, HLD, COPD current smoker, CHF, CKD, CHF on lasix 40 mg BID who presents 54 y/o female with PMH of HTN, HLD, COPD current smoker, CHF, CKD, CHF on lasix 40 mg BID who presents to ED for one week of gradually increased SOB, worse with exertion and b/l LE swelling. No chest pain. Pt states she has not been taking her lasix as prescribed. Denies cough  fever, known COVID contact

## 2020-06-08 NOTE — ED ADULT NURSE NOTE - NS TRANSFER PATIENT BELONGINGS
pocketbook; blue crocs; green and white night gown/Cell Phone/PDA (specify)/Other belongings/Clothing

## 2020-06-08 NOTE — ED PROVIDER NOTE - CLINICAL SUMMARY MEDICAL DECISION MAKING FREE TEXT BOX
55F p/w sob, Vitals reviewed to be wnl. crackles bilat. cxr- bilart vasc congestion. labs reviewed- elevated bnp. given diuretics, hypomg- given mgso4- admitted for chf exacerbation and diuresis.

## 2020-06-08 NOTE — ED PROVIDER NOTE - PHYSICAL EXAMINATION
CONSTITUTIONAL: Well-developed; well-nourished; in no acute distress.   SKIN: warm, dry  HEAD: Normocephalic; atraumatic.  EYES: no conjunctival injection. PERRL.   ENT: No nasal discharge; airway clear.  NECK: Supple; non tender.  CARD: S1, S2 normal; no murmurs, gallops, or rubs. Regular rate and rhythm.   RESP: + b/l rales   ABD: soft ntnd  EXT 1+ b/l LE edema.  No clubbing, cyanosis  LYMPH: No acute cervical adenopathy.  NEURO: Alert, oriented, grossly unremarkable  PSYCH: Cooperative, appropriate.

## 2020-06-09 LAB
A1C WITH ESTIMATED AVERAGE GLUCOSE RESULT: 5.6 % — SIGNIFICANT CHANGE UP (ref 4–5.6)
ALBUMIN SERPL ELPH-MCNC: 3.4 G/DL — LOW (ref 3.5–5.2)
ALP SERPL-CCNC: 61 U/L — SIGNIFICANT CHANGE UP (ref 30–115)
ALT FLD-CCNC: 7 U/L — SIGNIFICANT CHANGE UP (ref 0–41)
ANION GAP SERPL CALC-SCNC: 11 MMOL/L — SIGNIFICANT CHANGE UP (ref 7–14)
ANISOCYTOSIS BLD QL: SIGNIFICANT CHANGE UP
AST SERPL-CCNC: 8 U/L — SIGNIFICANT CHANGE UP (ref 0–41)
BASOPHILS # BLD AUTO: 0.15 K/UL — SIGNIFICANT CHANGE UP (ref 0–0.2)
BASOPHILS NFR BLD AUTO: 2.6 % — HIGH (ref 0–1)
BILIRUB SERPL-MCNC: 0.4 MG/DL — SIGNIFICANT CHANGE UP (ref 0.2–1.2)
BUN SERPL-MCNC: 18 MG/DL — SIGNIFICANT CHANGE UP (ref 10–20)
CALCIUM SERPL-MCNC: 8.9 MG/DL — SIGNIFICANT CHANGE UP (ref 8.5–10.1)
CHLORIDE SERPL-SCNC: 102 MMOL/L — SIGNIFICANT CHANGE UP (ref 98–110)
CHOLEST SERPL-MCNC: 131 MG/DL — SIGNIFICANT CHANGE UP (ref 100–200)
CK MB CFR SERPL CALC: 1.3 NG/ML — SIGNIFICANT CHANGE UP (ref 0.6–6.3)
CK SERPL-CCNC: 42 U/L — SIGNIFICANT CHANGE UP (ref 0–225)
CO2 SERPL-SCNC: 31 MMOL/L — SIGNIFICANT CHANGE UP (ref 17–32)
CREAT SERPL-MCNC: 1 MG/DL — SIGNIFICANT CHANGE UP (ref 0.7–1.5)
D DIMER BLD IA.RAPID-MCNC: 221 NG/ML DDU — SIGNIFICANT CHANGE UP (ref 0–230)
EOSINOPHIL # BLD AUTO: 0.35 K/UL — SIGNIFICANT CHANGE UP (ref 0–0.7)
EOSINOPHIL NFR BLD AUTO: 6.2 % — SIGNIFICANT CHANGE UP (ref 0–8)
ESTIMATED AVERAGE GLUCOSE: 114 MG/DL — SIGNIFICANT CHANGE UP (ref 68–114)
GIANT PLATELETS BLD QL SMEAR: PRESENT — SIGNIFICANT CHANGE UP
GLUCOSE BLDC GLUCOMTR-MCNC: 121 MG/DL — HIGH (ref 70–99)
GLUCOSE BLDC GLUCOMTR-MCNC: 137 MG/DL — HIGH (ref 70–99)
GLUCOSE BLDC GLUCOMTR-MCNC: 140 MG/DL — HIGH (ref 70–99)
GLUCOSE BLDC GLUCOMTR-MCNC: 151 MG/DL — HIGH (ref 70–99)
GLUCOSE SERPL-MCNC: 143 MG/DL — HIGH (ref 70–99)
HCT VFR BLD CALC: 32 % — LOW (ref 37–47)
HDLC SERPL-MCNC: 40 MG/DL — LOW
HGB BLD-MCNC: 10.1 G/DL — LOW (ref 12–16)
LIPID PNL WITH DIRECT LDL SERPL: 68 MG/DL — SIGNIFICANT CHANGE UP (ref 4–129)
LYMPHOCYTES # BLD AUTO: 1.15 K/UL — LOW (ref 1.2–3.4)
LYMPHOCYTES # BLD AUTO: 20.2 % — LOW (ref 20.5–51.1)
MACROCYTES BLD QL: SLIGHT — SIGNIFICANT CHANGE UP
MAGNESIUM SERPL-MCNC: 1.6 MG/DL — LOW (ref 1.8–2.4)
MAGNESIUM SERPL-MCNC: 2.2 MG/DL — SIGNIFICANT CHANGE UP (ref 1.8–2.4)
MANUAL SMEAR VERIFICATION: SIGNIFICANT CHANGE UP
MCHC RBC-ENTMCNC: 28.8 PG — SIGNIFICANT CHANGE UP (ref 27–31)
MCHC RBC-ENTMCNC: 31.6 G/DL — LOW (ref 32–37)
MCV RBC AUTO: 91.2 FL — SIGNIFICANT CHANGE UP (ref 81–99)
MONOCYTES # BLD AUTO: 0.35 K/UL — SIGNIFICANT CHANGE UP (ref 0.1–0.6)
MONOCYTES NFR BLD AUTO: 6.1 % — SIGNIFICANT CHANGE UP (ref 1.7–9.3)
NEUTROPHILS # BLD AUTO: 3.63 K/UL — SIGNIFICANT CHANGE UP (ref 1.4–6.5)
NEUTROPHILS NFR BLD AUTO: 64 % — SIGNIFICANT CHANGE UP (ref 42.2–75.2)
PLAT MORPH BLD: NORMAL — SIGNIFICANT CHANGE UP
PLATELET # BLD AUTO: 204 K/UL — SIGNIFICANT CHANGE UP (ref 130–400)
POLYCHROMASIA BLD QL SMEAR: SLIGHT — SIGNIFICANT CHANGE UP
POTASSIUM SERPL-MCNC: 3.9 MMOL/L — SIGNIFICANT CHANGE UP (ref 3.5–5)
POTASSIUM SERPL-SCNC: 3.9 MMOL/L — SIGNIFICANT CHANGE UP (ref 3.5–5)
PROT SERPL-MCNC: 5.7 G/DL — LOW (ref 6–8)
RBC # BLD: 3.51 M/UL — LOW (ref 4.2–5.4)
RBC # FLD: 17.4 % — HIGH (ref 11.5–14.5)
RBC BLD AUTO: ABNORMAL
SARS-COV-2 RNA SPEC QL NAA+PROBE: SIGNIFICANT CHANGE UP
SODIUM SERPL-SCNC: 144 MMOL/L — SIGNIFICANT CHANGE UP (ref 135–146)
TOTAL CHOLESTEROL/HDL RATIO MEASUREMENT: 3.3 RATIO — LOW (ref 4–5.5)
TRIGL SERPL-MCNC: 124 MG/DL — SIGNIFICANT CHANGE UP (ref 10–149)
TROPONIN T SERPL-MCNC: <0.01 NG/ML — SIGNIFICANT CHANGE UP
TROPONIN T SERPL-MCNC: <0.01 NG/ML — SIGNIFICANT CHANGE UP
TSH SERPL-MCNC: 1.22 UIU/ML — SIGNIFICANT CHANGE UP (ref 0.27–4.2)
VARIANT LYMPHS # BLD: 0.9 % — SIGNIFICANT CHANGE UP (ref 0–5)
WBC # BLD: 5.67 K/UL — SIGNIFICANT CHANGE UP (ref 4.8–10.8)
WBC # FLD AUTO: 5.67 K/UL — SIGNIFICANT CHANGE UP (ref 4.8–10.8)

## 2020-06-09 PROCEDURE — 99233 SBSQ HOSP IP/OBS HIGH 50: CPT

## 2020-06-09 RX ORDER — MAGNESIUM SULFATE 500 MG/ML
2 VIAL (ML) INJECTION ONCE
Refills: 0 | Status: COMPLETED | OUTPATIENT
Start: 2020-06-09 | End: 2020-06-09

## 2020-06-09 RX ADMIN — AMLODIPINE BESYLATE 10 MILLIGRAM(S): 2.5 TABLET ORAL at 05:11

## 2020-06-09 RX ADMIN — RISPERIDONE 3 MILLIGRAM(S): 4 TABLET ORAL at 22:02

## 2020-06-09 RX ADMIN — PANTOPRAZOLE SODIUM 40 MILLIGRAM(S): 20 TABLET, DELAYED RELEASE ORAL at 08:16

## 2020-06-09 RX ADMIN — Medication 16.67 GRAM(S): at 02:24

## 2020-06-09 RX ADMIN — Medication 50 MILLIGRAM(S): at 17:45

## 2020-06-09 RX ADMIN — Medication 1 PATCH: at 21:56

## 2020-06-09 RX ADMIN — Medication 1 PATCH: at 11:32

## 2020-06-09 RX ADMIN — CLOPIDOGREL BISULFATE 75 MILLIGRAM(S): 75 TABLET, FILM COATED ORAL at 12:30

## 2020-06-09 RX ADMIN — ATORVASTATIN CALCIUM 80 MILLIGRAM(S): 80 TABLET, FILM COATED ORAL at 22:02

## 2020-06-09 RX ADMIN — Medication 145 MILLIGRAM(S): at 11:32

## 2020-06-09 RX ADMIN — Medication 5 MILLIGRAM(S): at 17:46

## 2020-06-09 RX ADMIN — LISINOPRIL 40 MILLIGRAM(S): 2.5 TABLET ORAL at 05:12

## 2020-06-09 RX ADMIN — Medication 1: at 12:28

## 2020-06-09 RX ADMIN — Medication 50 MILLIGRAM(S): at 05:11

## 2020-06-09 RX ADMIN — Medication 40 MILLIGRAM(S): at 22:00

## 2020-06-09 RX ADMIN — Medication 30 MILLIGRAM(S): at 05:11

## 2020-06-09 RX ADMIN — Medication 30 MILLIGRAM(S): at 17:46

## 2020-06-09 RX ADMIN — Medication 5 MILLIGRAM(S): at 05:11

## 2020-06-09 RX ADMIN — Medication 81 MILLIGRAM(S): at 11:31

## 2020-06-09 RX ADMIN — Medication 40 MILLIGRAM(S): at 11:32

## 2020-06-09 RX ADMIN — RISPERIDONE 1 MILLIGRAM(S): 4 TABLET ORAL at 09:38

## 2020-06-09 RX ADMIN — ENOXAPARIN SODIUM 40 MILLIGRAM(S): 100 INJECTION SUBCUTANEOUS at 22:01

## 2020-06-09 RX ADMIN — Medication 1 PATCH: at 13:48

## 2020-06-09 RX ADMIN — QUETIAPINE FUMARATE 100 MILLIGRAM(S): 200 TABLET, FILM COATED ORAL at 22:02

## 2020-06-09 NOTE — PROGRESS NOTE ADULT - SUBJECTIVE AND OBJECTIVE BOX
pt seen and examined. feels better, leg swelling improved, no sob. no pain    Vital Signs Last 24 Hrs  T(C): 36.6 (09 Jun 2020 13:00), Max: 36.9 (09 Jun 2020 03:54)  T(F): 97.9 (09 Jun 2020 13:00), Max: 98.4 (09 Jun 2020 03:54)  HR: 70 (09 Jun 2020 13:00) (70 - 98)  BP: 117/70 (09 Jun 2020 13:00) (112/74 - 138/83)  BP(mean): --  RR: 18 (09 Jun 2020 13:00) (18 - 20)  SpO2: 97% (09 Jun 2020 05:21) (97% - 99%)    Physical exam:   constitutional NAD, AAOX3, Respiratory  lungs CTA, CVS heart RRR, GI: abdomen Soft NT, ND, BS+, skin: intact, lower ext 1+ edema.   neuro exam pt is wheelchair bound. can move all 4 ext but has gait distrubance which is not new                          10.1   5.67  )-----------( 204      ( 09 Jun 2020 07:41 )             32.0   06-09    144  |  102  |  18  ----------------------------<  143<H>  3.9   |  31  |  1.0    Ca    8.9      09 Jun 2020 07:41  Mg     2.2     06-09    TPro  5.7<L>  /  Alb  3.4<L>  /  TBili  0.4  /  DBili  x   /  AST  8   /  ALT  7   /  AlkPhos  61  06-09    a/p  # Acute on chronic systolic Exacerbation of CHF and pulm htn,   - notcomplaint with lasix  cont lasix. IV     # COPD on home O2.  stable, no wheezing    # History of CVA with left sided weakness, wheelchair bound.   - continue DAP, statin     # HTN  - controlled, cont meds    # DM  CAPILLARY BLOOD GLUCOSE  POCT Blood Glucose.: 151 mg/dL (09 Jun 2020 11:28)  POCT Blood Glucose.: 137 mg/dL (09 Jun 2020 08:07)  POCT Blood Glucose.: 128 mg/dL (08 Jun 2020 17:19)  on lispro , monitor labs, at home she is on metformin,    # Dyslipidemia  - continue meds    Full code

## 2020-06-10 LAB
GLUCOSE BLDC GLUCOMTR-MCNC: 146 MG/DL — HIGH (ref 70–99)
GLUCOSE BLDC GLUCOMTR-MCNC: 149 MG/DL — HIGH (ref 70–99)
GLUCOSE BLDC GLUCOMTR-MCNC: 154 MG/DL — HIGH (ref 70–99)
GLUCOSE BLDC GLUCOMTR-MCNC: 171 MG/DL — HIGH (ref 70–99)

## 2020-06-10 PROCEDURE — 99233 SBSQ HOSP IP/OBS HIGH 50: CPT

## 2020-06-10 RX ADMIN — RISPERIDONE 1 MILLIGRAM(S): 4 TABLET ORAL at 08:03

## 2020-06-10 RX ADMIN — Medication 30 MILLIGRAM(S): at 17:30

## 2020-06-10 RX ADMIN — Medication 30 MILLIGRAM(S): at 05:31

## 2020-06-10 RX ADMIN — Medication 1 PATCH: at 11:46

## 2020-06-10 RX ADMIN — Medication 40 MILLIGRAM(S): at 21:58

## 2020-06-10 RX ADMIN — Medication 1 PATCH: at 19:53

## 2020-06-10 RX ADMIN — Medication 1 PATCH: at 08:05

## 2020-06-10 RX ADMIN — PANTOPRAZOLE SODIUM 40 MILLIGRAM(S): 20 TABLET, DELAYED RELEASE ORAL at 05:31

## 2020-06-10 RX ADMIN — RISPERIDONE 3 MILLIGRAM(S): 4 TABLET ORAL at 21:58

## 2020-06-10 RX ADMIN — Medication 40 MILLIGRAM(S): at 11:44

## 2020-06-10 RX ADMIN — Medication 5 MILLIGRAM(S): at 05:30

## 2020-06-10 RX ADMIN — Medication 50 MILLIGRAM(S): at 17:30

## 2020-06-10 RX ADMIN — Medication 1: at 08:02

## 2020-06-10 RX ADMIN — QUETIAPINE FUMARATE 100 MILLIGRAM(S): 200 TABLET, FILM COATED ORAL at 21:58

## 2020-06-10 RX ADMIN — LISINOPRIL 40 MILLIGRAM(S): 2.5 TABLET ORAL at 05:31

## 2020-06-10 RX ADMIN — Medication 50 MILLIGRAM(S): at 05:30

## 2020-06-10 RX ADMIN — Medication 81 MILLIGRAM(S): at 11:45

## 2020-06-10 RX ADMIN — AMLODIPINE BESYLATE 10 MILLIGRAM(S): 2.5 TABLET ORAL at 05:30

## 2020-06-10 RX ADMIN — ENOXAPARIN SODIUM 40 MILLIGRAM(S): 100 INJECTION SUBCUTANEOUS at 21:58

## 2020-06-10 RX ADMIN — ATORVASTATIN CALCIUM 80 MILLIGRAM(S): 80 TABLET, FILM COATED ORAL at 21:57

## 2020-06-10 RX ADMIN — Medication 1 PATCH: at 11:45

## 2020-06-10 RX ADMIN — Medication 145 MILLIGRAM(S): at 11:45

## 2020-06-10 RX ADMIN — CLOPIDOGREL BISULFATE 75 MILLIGRAM(S): 75 TABLET, FILM COATED ORAL at 11:45

## 2020-06-10 RX ADMIN — Medication 5 MILLIGRAM(S): at 17:30

## 2020-06-10 NOTE — CDI QUERY NOTE - NSCDIOTHERTXTBX_GEN_ALL_CORE_HH
Patient admitted with SOB and found to have acute on chronic systolic CHF.  Patient noted with COPD, severe, on home O2, 3L. Nursing flow sheets notes O2 at 3L this admission.  PN 6/9/20-   # COPD on home O2.  stable, no wheezing  SpO2: 97% (09 Jun 2020 05:21) (97% - 99%)    Please clarify-  - Chronic respiratory failure  -Other, specify  -Unable to determine

## 2020-06-10 NOTE — PROGRESS NOTE ADULT - SUBJECTIVE AND OBJECTIVE BOX
WILLIAN MARY 55y Female  MRN#: [id number]   Hospital Day: 2d    SUBJECTIVE  Patient is a 55y old Female who presents with a chief complaint of Shortness of breath (09 Jun 2020 16:03)    OBJECTIVE  PAST MEDICAL & SURGICAL HISTORY  CKD (chronic kidney disease), stage II  Type 2 diabetes mellitus  Cerebrovascular accident (CVA): Multiple  CHF (congestive heart failure)  COPD, severe  Anxiety and depression  Hyperlipidemia  Hypertension  No significant past surgical history    ALLERGIES:  No Known Allergies    MEDICATIONS:  STANDING MEDICATIONS  amLODIPine   Tablet 10 milliGRAM(s) Oral daily  aspirin enteric coated 81 milliGRAM(s) Oral daily  atorvastatin 80 milliGRAM(s) Oral at bedtime  busPIRone 30 milliGRAM(s) Oral two times a day  chlorhexidine 4% Liquid 1 Application(s) Topical <User Schedule>  clopidogrel Tablet 75 milliGRAM(s) Oral daily  dextrose 5%. 1000 milliLiter(s) IV Continuous <Continuous>  dextrose 50% Injectable 12.5 Gram(s) IV Push once  dextrose 50% Injectable 25 Gram(s) IV Push once  dextrose 50% Injectable 25 Gram(s) IV Push once  enoxaparin Injectable 40 milliGRAM(s) SubCutaneous at bedtime  fenofibrate Tablet 145 milliGRAM(s) Oral daily  furosemide   Injectable 40 milliGRAM(s) IV Push every 12 hours  insulin lispro (HumaLOG) corrective regimen sliding scale   SubCutaneous three times a day before meals  lisinopril 40 milliGRAM(s) Oral daily  metoprolol tartrate 50 milliGRAM(s) Oral two times a day  nicotine -  14 mG/24Hr(s) Patch 1 patch Transdermal daily  oxybutynin 5 milliGRAM(s) Oral two times a day  pantoprazole    Tablet 40 milliGRAM(s) Oral before breakfast  QUEtiapine 100 milliGRAM(s) Oral at bedtime  risperiDONE   Tablet 1 milliGRAM(s) Oral <User Schedule>  risperiDONE   Tablet 3 milliGRAM(s) Oral at bedtime    PRN MEDICATIONS  acetaminophen    Suspension .. 650 milliGRAM(s) Oral every 6 hours PRN  ALBUTerol    90 MICROgram(s) HFA Inhaler 2 Puff(s) Inhalation every 6 hours PRN  dextrose 40% Gel 15 Gram(s) Oral once PRN  glucagon  Injectable 1 milliGRAM(s) IntraMuscular once PRN      VITAL SIGNS: Last 24 Hours  T(C): 36.4 (10 Davin 2020 05:20), Max: 36.6 (09 Jun 2020 13:00)  T(F): 97.5 (10 Davin 2020 05:20), Max: 97.9 (09 Jun 2020 13:00)  HR: 70 (10 Davin 2020 05:20) (70 - 80)  BP: 110/76 (10 Davin 2020 05:20) (110/76 - 122/71)  BP(mean): --  RR: 18 (10 Davin 2020 08:08) (18 - 18)  SpO2: 97% (10 Davin 2020 08:08) (97% - 97%)    LABS:                        10.1   5.67  )-----------( 204      ( 09 Jun 2020 07:41 )             32.0     06-09    144  |  102  |  18  ----------------------------<  143<H>  3.9   |  31  |  1.0    Ca    8.9      09 Jun 2020 07:41  Mg     2.2     06-09    TPro  5.7<L>  /  Alb  3.4<L>  /  TBili  0.4  /  DBili  x   /  AST  8   /  ALT  7   /  AlkPhos  61  06-09              CARDIAC MARKERS ( 09 Jun 2020 07:41 )  x     / <0.01 ng/mL / x     / x     / x      CARDIAC MARKERS ( 09 Jun 2020 00:09 )  x     / <0.01 ng/mL / 42 U/L / x     / 1.3 ng/mL      PHYSICAL EXAM:  CONSTITUTIONAL: AAOx2  HEAD: Atraumatic, normocephalic  EYES: EOM intact, PERRLA, conjunctiva and sclera clear  ENT: Supple, no masses, no thyromegaly, no bruits, no JVD; moist mucous membranes  PULMONARY: Clear to auscultation bilaterally; no wheezes, rales, or rhonchi  CARDIOVASCULAR: Regular rate and rhythm; no murmurs, rubs, or gallops  GASTROINTESTINAL: Soft, non-tender, non-distended; bowel sounds present  MUSCULOSKELETAL: LE edema 1+  NEUROLOGY: Left upper and lower extremity weakness  SKIN: No rashes or lesions; warm and dry

## 2020-06-10 NOTE — PROGRESS NOTE ADULT - ATTENDING COMMENTS
Agree with resident's note, HPI, PE, assessment and plan.  Pt was seen and examined independently.    Pt feels better, no new complaints.     Physical exam:  NAD, not toxic looking  HEENT: PERRL  NECK: supple, no JVD  RESP: faint basilar crackles on left   CVS: S1S2, RRR  GI: abdomen soft NT, ND  Extremities: LE trace pedal edema, no calf tenderness b/l  NEURO: AOx3, no focal deficit    labs reviewed.   Images reviewed and compared to prior.    A/P: # Acute on chronic systolic heart failure, likely due to Lasix non compliance  - cont Lasix IV, will switch to PO tomorrow  - cont ACE, BB, DAPT    # COPD with chronic respiratory failure - no signs of exacerbation   - titrate down O2 supplement to keep O2 sat >92  - pt is not on inhalers     # Hx of CVA with left side weakness, wheelchair bound - cont statins and ASA    rest as above    pt lives alone, has HHA 5 hrs  a day    Anticipated dc in 24-48 hrs.

## 2020-06-11 ENCOUNTER — TRANSCRIPTION ENCOUNTER (OUTPATIENT)
Age: 55
End: 2020-06-11

## 2020-06-11 VITALS
TEMPERATURE: 98 F | DIASTOLIC BLOOD PRESSURE: 79 MMHG | HEART RATE: 71 BPM | RESPIRATION RATE: 18 BRPM | SYSTOLIC BLOOD PRESSURE: 121 MMHG

## 2020-06-11 LAB
ALBUMIN SERPL ELPH-MCNC: 3.1 G/DL — LOW (ref 3.5–5.2)
ALP SERPL-CCNC: 53 U/L — SIGNIFICANT CHANGE UP (ref 30–115)
ALT FLD-CCNC: 6 U/L — SIGNIFICANT CHANGE UP (ref 0–41)
ANION GAP SERPL CALC-SCNC: 9 MMOL/L — SIGNIFICANT CHANGE UP (ref 7–14)
AST SERPL-CCNC: 8 U/L — SIGNIFICANT CHANGE UP (ref 0–41)
BASOPHILS # BLD AUTO: 0.06 K/UL — SIGNIFICANT CHANGE UP (ref 0–0.2)
BASOPHILS NFR BLD AUTO: 1 % — SIGNIFICANT CHANGE UP (ref 0–1)
BILIRUB SERPL-MCNC: 0.6 MG/DL — SIGNIFICANT CHANGE UP (ref 0.2–1.2)
BUN SERPL-MCNC: 19 MG/DL — SIGNIFICANT CHANGE UP (ref 10–20)
CALCIUM SERPL-MCNC: 8.7 MG/DL — SIGNIFICANT CHANGE UP (ref 8.5–10.1)
CHLORIDE SERPL-SCNC: 100 MMOL/L — SIGNIFICANT CHANGE UP (ref 98–110)
CO2 SERPL-SCNC: 33 MMOL/L — HIGH (ref 17–32)
CREAT SERPL-MCNC: 1.1 MG/DL — SIGNIFICANT CHANGE UP (ref 0.7–1.5)
EOSINOPHIL # BLD AUTO: 0.14 K/UL — SIGNIFICANT CHANGE UP (ref 0–0.7)
EOSINOPHIL NFR BLD AUTO: 2.3 % — SIGNIFICANT CHANGE UP (ref 0–8)
GLUCOSE BLDC GLUCOMTR-MCNC: 144 MG/DL — HIGH (ref 70–99)
GLUCOSE BLDC GLUCOMTR-MCNC: 153 MG/DL — HIGH (ref 70–99)
GLUCOSE SERPL-MCNC: 134 MG/DL — HIGH (ref 70–99)
HCT VFR BLD CALC: 33.5 % — LOW (ref 37–47)
HGB BLD-MCNC: 10.2 G/DL — LOW (ref 12–16)
IMM GRANULOCYTES NFR BLD AUTO: 0.3 % — SIGNIFICANT CHANGE UP (ref 0.1–0.3)
LYMPHOCYTES # BLD AUTO: 1.52 K/UL — SIGNIFICANT CHANGE UP (ref 1.2–3.4)
LYMPHOCYTES # BLD AUTO: 25.3 % — SIGNIFICANT CHANGE UP (ref 20.5–51.1)
MAGNESIUM SERPL-MCNC: 1.7 MG/DL — LOW (ref 1.8–2.4)
MCHC RBC-ENTMCNC: 27.1 PG — SIGNIFICANT CHANGE UP (ref 27–31)
MCHC RBC-ENTMCNC: 30.4 G/DL — LOW (ref 32–37)
MCV RBC AUTO: 89.1 FL — SIGNIFICANT CHANGE UP (ref 81–99)
MONOCYTES # BLD AUTO: 0.39 K/UL — SIGNIFICANT CHANGE UP (ref 0.1–0.6)
MONOCYTES NFR BLD AUTO: 6.5 % — SIGNIFICANT CHANGE UP (ref 1.7–9.3)
NEUTROPHILS # BLD AUTO: 3.87 K/UL — SIGNIFICANT CHANGE UP (ref 1.4–6.5)
NEUTROPHILS NFR BLD AUTO: 64.6 % — SIGNIFICANT CHANGE UP (ref 42.2–75.2)
NRBC # BLD: 0 /100 WBCS — SIGNIFICANT CHANGE UP (ref 0–0)
PHOSPHATE SERPL-MCNC: 4.1 MG/DL — SIGNIFICANT CHANGE UP (ref 2.1–4.9)
PLATELET # BLD AUTO: 251 K/UL — SIGNIFICANT CHANGE UP (ref 130–400)
POTASSIUM SERPL-MCNC: 4.2 MMOL/L — SIGNIFICANT CHANGE UP (ref 3.5–5)
POTASSIUM SERPL-SCNC: 4.2 MMOL/L — SIGNIFICANT CHANGE UP (ref 3.5–5)
PROT SERPL-MCNC: 5.6 G/DL — LOW (ref 6–8)
RBC # BLD: 3.76 M/UL — LOW (ref 4.2–5.4)
RBC # FLD: 17.4 % — HIGH (ref 11.5–14.5)
SODIUM SERPL-SCNC: 142 MMOL/L — SIGNIFICANT CHANGE UP (ref 135–146)
WBC # BLD: 6 K/UL — SIGNIFICANT CHANGE UP (ref 4.8–10.8)
WBC # FLD AUTO: 6 K/UL — SIGNIFICANT CHANGE UP (ref 4.8–10.8)

## 2020-06-11 PROCEDURE — 99239 HOSP IP/OBS DSCHRG MGMT >30: CPT

## 2020-06-11 RX ORDER — MAGNESIUM SULFATE 500 MG/ML
2 VIAL (ML) INJECTION ONCE
Refills: 0 | Status: COMPLETED | OUTPATIENT
Start: 2020-06-11 | End: 2020-06-11

## 2020-06-11 RX ORDER — POLYETHYLENE GLYCOL 3350 17 G/17G
17 POWDER, FOR SOLUTION ORAL ONCE
Refills: 0 | Status: COMPLETED | OUTPATIENT
Start: 2020-06-11 | End: 2020-06-11

## 2020-06-11 RX ORDER — SENNA PLUS 8.6 MG/1
2 TABLET ORAL
Qty: 60 | Refills: 0
Start: 2020-06-11 | End: 2020-07-10

## 2020-06-11 RX ORDER — POLYETHYLENE GLYCOL 3350 17 G/17G
17 POWDER, FOR SOLUTION ORAL
Qty: 510 | Refills: 0
Start: 2020-06-11 | End: 2020-07-10

## 2020-06-11 RX ORDER — FUROSEMIDE 40 MG
40 TABLET ORAL
Refills: 0 | Status: DISCONTINUED | OUTPATIENT
Start: 2020-06-11 | End: 2020-06-11

## 2020-06-11 RX ADMIN — LISINOPRIL 40 MILLIGRAM(S): 2.5 TABLET ORAL at 05:34

## 2020-06-11 RX ADMIN — RISPERIDONE 1 MILLIGRAM(S): 4 TABLET ORAL at 08:09

## 2020-06-11 RX ADMIN — Medication 50 MILLIGRAM(S): at 05:34

## 2020-06-11 RX ADMIN — Medication 1: at 11:54

## 2020-06-11 RX ADMIN — Medication 1 PATCH: at 06:57

## 2020-06-11 RX ADMIN — Medication 145 MILLIGRAM(S): at 11:54

## 2020-06-11 RX ADMIN — Medication 5 MILLIGRAM(S): at 05:34

## 2020-06-11 RX ADMIN — Medication 81 MILLIGRAM(S): at 11:55

## 2020-06-11 RX ADMIN — AMLODIPINE BESYLATE 10 MILLIGRAM(S): 2.5 TABLET ORAL at 05:34

## 2020-06-11 RX ADMIN — POLYETHYLENE GLYCOL 3350 17 GRAM(S): 17 POWDER, FOR SOLUTION ORAL at 12:54

## 2020-06-11 RX ADMIN — CLOPIDOGREL BISULFATE 75 MILLIGRAM(S): 75 TABLET, FILM COATED ORAL at 11:54

## 2020-06-11 RX ADMIN — Medication 25 GRAM(S): at 11:53

## 2020-06-11 RX ADMIN — CHLORHEXIDINE GLUCONATE 1 APPLICATION(S): 213 SOLUTION TOPICAL at 05:31

## 2020-06-11 RX ADMIN — PANTOPRAZOLE SODIUM 40 MILLIGRAM(S): 20 TABLET, DELAYED RELEASE ORAL at 05:34

## 2020-06-11 RX ADMIN — Medication 1 PATCH: at 11:54

## 2020-06-11 RX ADMIN — Medication 30 MILLIGRAM(S): at 05:34

## 2020-06-11 NOTE — DISCHARGE NOTE PROVIDER - NSDCCPCAREPLAN_GEN_ALL_CORE_FT
PRINCIPAL DISCHARGE DIAGNOSIS  Diagnosis: CHF (congestive heart failure)  Assessment and Plan of Treatment: You came in with heart failure exacerbation due to medication non-complaince. You are being discharged on same dose of your medication. Please keep taking your medication regularly.   If you experience symptoms again please call 911 or go to the nearest emergency department

## 2020-06-11 NOTE — DISCHARGE NOTE PROVIDER - CARE PROVIDER_API CALL
Bernardino Michaels  CARDIOVASCULAR DISEASE  [address] [place] [address]  Phone: [phone]  Fax: [phone]  Follow Up Time: 1 week

## 2020-06-11 NOTE — DISCHARGE NOTE PROVIDER - INSTRUCTIONS
A combination diet rich in fruits, vegetables, legumes, and low-fat dairy products and low in snacks, sweets, meats, and saturated and total fat (this combination diet is called the "DASH diet"). The DASH diet is comprised of four to five servings of fruit, four to five servings of vegetables, two to three servings of low-fat dairy per day, and <25 percent fat

## 2020-06-11 NOTE — DISCHARGE NOTE NURSING/CASE MANAGEMENT/SOCIAL WORK - PATIENT PORTAL LINK FT
You can access the FollowMyHealth Patient Portal offered by [facility] by registering at the following website: [email or web address]. By joining [facility]’s FollowMyHealth portal, you will also be able to view your health information using other applications (apps) compatible with our system.

## 2020-06-11 NOTE — DISCHARGE NOTE PROVIDER - HOSPITAL COURSE
55 year old female patient with PMH of HTN, COPD (on home O2, 3L/min, current smoker), HFrEF (EF 38%), multiple CVA with residual left sided weakness, presenting today to ED complaining of shortness of breath, and worsening lower extremities edema.         #Acute on chronic systolic Exacerbation of CHF     Shortness of breath, volume overload, pulmonary congestion on X-ray, elevated BNP 2022. TTE in February 2020, EF: 38%, grade II diastolic dysfunction, moderate pulmonary HTN. Negative Cardiac cath by Dr. Murphy on 11/8/2018. not complaint with home dose of lasix. Was on IV inpatient. Will discharge on oral dose of lasix    Metoprolol 50 mg PO BID, lisinopril 40 mg PO QD        #COPD on home O2 (3L)        #History of CVA with left sided weakness    Aspirin 81mg PO QD + Plavix 75mg PO QD + atorvastatin 80mg PO QHS + buspirone 30 mg PO QD        #HTN    lisinopril 40mg PO QD + amlodipine 10mg PO QD        #DM - oral meds        #DLD -  fenofibrate 145 PO QD and atorvastatin 80mg PO QHS

## 2020-06-11 NOTE — DISCHARGE NOTE PROVIDER - NSDCMRMEDTOKEN_GEN_ALL_CORE_FT
aspirin 81 mg oral tablet: 1 tab(s) orally once a day  BuSpar 10 mg oral tablet: 3 tab(s) orally 2 times a day  doxepin 50 mg oral capsule: 1 cap(s) orally once a day  fenofibrate 145 mg oral tablet: 1 tab(s) orally once a day  glipiZIDE 10 mg oral tablet: 1 tab(s) orally 2 times a day  Lasix 40 mg oral tablet: 1 tab(s) orally 2 times a day  Lipitor 80 mg oral tablet: 1 tab(s) orally once a day  lisinopril 40 mg oral tablet: 1 tab(s) orally once a day  metFORMIN 1000 mg oral tablet: 1 tab(s) orally 2 times a day  metoprolol tartrate 50 mg oral tablet: 1 tab(s) orally 2 times a day  Norvasc 10 mg oral tablet: 1 tab(s) orally once a day  oxybutynin 5 mg oral tablet: 1 tab(s) orally 2 times a day  Plavix 75 mg oral tablet: 1 tab(s) orally once a day  polyethylene glycol 3350 oral powder for reconstitution: 17 gram(s) orally once a day, As Needed -for constipation   RisperDAL 1 mg oral tablet: 1 tab(s) orally once a day in AM  RisperDAL 3 mg oral tablet: 1 tab(s) orally once a day (at bedtime)  senna oral tablet: 2 tab(s) orally once a day (at bedtime), As Needed -for constipation   SEROquel 100 mg oral tablet: 1 tab(s) orally once a day (at bedtime)  Vitamin D3 50,000 intl units (1250 mcg) oral capsule: 1 cap(s) orally once a week

## 2020-06-11 NOTE — PROGRESS NOTE ADULT - SUBJECTIVE AND OBJECTIVE BOX
WILLIAN MARY 55y Female  MRN#: [id number]   Hospital Day: 3d    SUBJECTIVE  Patient is a 55y old Female who presents with a chief complaint of Shortness of breath (10 Davin 2020 10:09)    OBJECTIVE  PAST MEDICAL & SURGICAL HISTORY  CKD (chronic kidney disease), stage II  Type 2 diabetes mellitus  Cerebrovascular accident (CVA): Multiple  CHF (congestive heart failure)  COPD, severe  Anxiety and depression  Hyperlipidemia  Hypertension  No significant past surgical history    ALLERGIES:  No Known Allergies    MEDICATIONS:  STANDING MEDICATIONS  amLODIPine   Tablet 10 milliGRAM(s) Oral daily  aspirin enteric coated 81 milliGRAM(s) Oral daily  atorvastatin 80 milliGRAM(s) Oral at bedtime  busPIRone 30 milliGRAM(s) Oral two times a day  chlorhexidine 4% Liquid 1 Application(s) Topical <User Schedule>  clopidogrel Tablet 75 milliGRAM(s) Oral daily  dextrose 5%. 1000 milliLiter(s) IV Continuous <Continuous>  dextrose 50% Injectable 12.5 Gram(s) IV Push once  dextrose 50% Injectable 25 Gram(s) IV Push once  dextrose 50% Injectable 25 Gram(s) IV Push once  enoxaparin Injectable 40 milliGRAM(s) SubCutaneous at bedtime  fenofibrate Tablet 145 milliGRAM(s) Oral daily  furosemide   Injectable 40 milliGRAM(s) IV Push every 12 hours  insulin lispro (HumaLOG) corrective regimen sliding scale   SubCutaneous three times a day before meals  lisinopril 40 milliGRAM(s) Oral daily  magnesium sulfate  IVPB 2 Gram(s) IV Intermittent once  metoprolol tartrate 50 milliGRAM(s) Oral two times a day  nicotine -  14 mG/24Hr(s) Patch 1 patch Transdermal daily  oxybutynin 5 milliGRAM(s) Oral two times a day  pantoprazole    Tablet 40 milliGRAM(s) Oral before breakfast  QUEtiapine 100 milliGRAM(s) Oral at bedtime  risperiDONE   Tablet 1 milliGRAM(s) Oral <User Schedule>  risperiDONE   Tablet 3 milliGRAM(s) Oral at bedtime    PRN MEDICATIONS  acetaminophen    Suspension .. 650 milliGRAM(s) Oral every 6 hours PRN  ALBUTerol    90 MICROgram(s) HFA Inhaler 2 Puff(s) Inhalation every 6 hours PRN  dextrose 40% Gel 15 Gram(s) Oral once PRN  glucagon  Injectable 1 milliGRAM(s) IntraMuscular once PRN      VITAL SIGNS: Last 24 Hours  T(C): 36.7 (11 Jun 2020 05:23), Max: 36.8 (10 Davin 2020 13:25)  T(F): 98.1 (11 Jun 2020 05:23), Max: 98.3 (10 Davin 2020 13:25)  HR: 76 (11 Jun 2020 05:23) (72 - 79)  BP: 124/84 (11 Jun 2020 05:23) (110/77 - 134/81)  BP(mean): --  RR: 18 (11 Jun 2020 05:23) (18 - 18)  SpO2: 93% (10 Davin 2020 19:53) (93% - 95%)    LABS:                        10.2   6.00  )-----------( 251      ( 11 Jun 2020 06:28 )             33.5     06-11    142  |  100  |  19  ----------------------------<  134<H>  4.2   |  33<H>  |  1.1    Ca    8.7      11 Jun 2020 06:28  Phos  4.1     06-11  Mg     1.7     06-11    TPro  5.6<L>  /  Alb  3.1<L>  /  TBili  0.6  /  DBili  x   /  AST  8   /  ALT  6   /  AlkPhos  53  06-11    PHYSICAL EXAM:  CONSTITUTIONAL: AAOx2  HEAD: Atraumatic, normocephalic  EYES: EOM intact, PERRLA, conjunctiva and sclera clear  ENT: Supple, no masses, no thyromegaly, no bruits, no JVD; moist mucous membranes  PULMONARY: Crackles on left  CARDIOVASCULAR: Regular rate and rhythm; no murmurs, rubs, or gallops  GASTROINTESTINAL: Soft, non-tender, non-distended; bowel sounds present  MUSCULOSKELETAL: LE edema 1+  NEUROLOGY: Left upper and lower extremity weakness  SKIN: No rashes or lesions; warm and dry

## 2020-06-11 NOTE — PROGRESS NOTE ADULT - ASSESSMENT
55 year old female patient with PMH of HTN, COPD (on home O2, 3L/min, current smoker), HFrEF (EF 38%), multiple CVA with residual left sided weakness, presenting today to ED complaining of shortness of breath, and worsening lower extremities edema.     #Acute on chronic systolic Exacerbation of CHF   - Shortness of breath, volume overload, pulmonary congestion on X-ray, elevated BNP 2022  - TTE in February 2020, EF: 38%, grade II diastolic dysfunction, moderate pulmonary HTN  - Negative Cardiac cath by Dr. Murphy on 11/8/2018  - not complaint with home dose of lasix  - Accurate intake. output daily weight.  - Venous doppler LE -ve for DVT  PLAN  - Continue Lasix 40mg IV Q12 hours. Will consider switching to PO.  - Metoprolol 50 mg PO BID, lisinopril 40 mg PO QD    #COPD on home O2 (3L)  - not following with pulmo, not on inhalers treatment  - albuterol as needed  - Continue O2 at 2L    #History of CVA with left sided weakness  - Aspirin 81mg PO QD + Plavix 75mg PO QD + atorvastatin 80mg PO QHS + buspirone 30 mg PO QD    #HTN  - Lasix 40mg IV Q12 hours +  lisinopril 40mg PO QD + amlodipine 10mg PO QD    #DM - Holding oral hypoglycemic. Start insulin if FS > 180  #DLD -  fenofibrate 145 PO QD and atorvastatin 80mg PO QHS    #Misc  - DVT Prophylaxis: Lovenox  - Diet: DASH/TLC  - GI Prophylaxis: Pantoprazole  - Activity: As tolerated  - Code Status: Full Code    Dispo: Still on IV diuretics. Possible switch to oral and discharge today.   Wheelchair bound at baseline. Has home health aid for 5 hours.

## 2020-06-11 NOTE — PROGRESS NOTE ADULT - ATTENDING COMMENTS
Agree with resident's note, HPI, PE, assessment and plan.  Pt was seen and examined independently.     Pt feels better, no new complaints, she is on 2L via NC (usual requirements).    Physical exam:  NAD, not toxic looking  HEENT: PERRL  NECK: supple, no JVD  RESP: CTA b/l   CVS: S1S2, RRR  GI: abdomen soft NT, ND  Extremities: no pedal edema, no calf tenderness b/l  NEURO: AOx3, no focal deficit    labs reviewed.     A/P: # Acute on chronic systolic heart failure, likely due to Lasix non compliance  - switch Lasix IV to PO  - cont ACE, BB, DAPT  - salt restriction and fluid restriction     # COPD with chronic respiratory failure - no signs of exacerbation   - titrate down O2 supplement to keep O2 sat >92  - pt is not on inhalers     # Hx of CVA with left side weakness, wheelchair bound - cont statins and ASA    rest as above    pt is clinically stable for discharge home today. HHA  to be reinstated (5hrs a day)

## 2020-06-14 DIAGNOSIS — Z99.3 DEPENDENCE ON WHEELCHAIR: ICD-10-CM

## 2020-06-14 DIAGNOSIS — I13.0 HYPERTENSIVE HEART AND CHRONIC KIDNEY DISEASE WITH HEART FAILURE AND STAGE 1 THROUGH STAGE 4 CHRONIC KIDNEY DISEASE, OR UNSPECIFIED CHRONIC KIDNEY DISEASE: ICD-10-CM

## 2020-06-14 DIAGNOSIS — I27.20 PULMONARY HYPERTENSION, UNSPECIFIED: ICD-10-CM

## 2020-06-14 DIAGNOSIS — Z60.2 PROBLEMS RELATED TO LIVING ALONE: ICD-10-CM

## 2020-06-14 DIAGNOSIS — Z99.81 DEPENDENCE ON SUPPLEMENTAL OXYGEN: ICD-10-CM

## 2020-06-14 DIAGNOSIS — E83.42 HYPOMAGNESEMIA: ICD-10-CM

## 2020-06-14 DIAGNOSIS — Z79.82 LONG TERM (CURRENT) USE OF ASPIRIN: ICD-10-CM

## 2020-06-14 DIAGNOSIS — Z91.14 PATIENT'S OTHER NONCOMPLIANCE WITH MEDICATION REGIMEN: ICD-10-CM

## 2020-06-14 DIAGNOSIS — I50.23 ACUTE ON CHRONIC SYSTOLIC (CONGESTIVE) HEART FAILURE: ICD-10-CM

## 2020-06-14 DIAGNOSIS — F17.210 NICOTINE DEPENDENCE, CIGARETTES, UNCOMPLICATED: ICD-10-CM

## 2020-06-14 DIAGNOSIS — J44.9 CHRONIC OBSTRUCTIVE PULMONARY DISEASE, UNSPECIFIED: ICD-10-CM

## 2020-06-14 DIAGNOSIS — E78.5 HYPERLIPIDEMIA, UNSPECIFIED: ICD-10-CM

## 2020-06-14 DIAGNOSIS — I69.951 HEMIPLEGIA AND HEMIPARESIS FOLLOWING UNSPECIFIED CEREBROVASCULAR DISEASE AFFECTING RIGHT DOMINANT SIDE: ICD-10-CM

## 2020-06-14 DIAGNOSIS — Z79.84 LONG TERM (CURRENT) USE OF ORAL HYPOGLYCEMIC DRUGS: ICD-10-CM

## 2020-06-14 DIAGNOSIS — J96.10 CHRONIC RESPIRATORY FAILURE, UNSPECIFIED WHETHER WITH HYPOXIA OR HYPERCAPNIA: ICD-10-CM

## 2020-06-14 DIAGNOSIS — N18.2 CHRONIC KIDNEY DISEASE, STAGE 2 (MILD): ICD-10-CM

## 2020-06-14 DIAGNOSIS — Z79.02 LONG TERM (CURRENT) USE OF ANTITHROMBOTICS/ANTIPLATELETS: ICD-10-CM

## 2020-06-14 SDOH — SOCIAL STABILITY - SOCIAL INSECURITY: PROBLEMS RELATED TO LIVING ALONE: Z60.2

## 2020-07-12 ENCOUNTER — INPATIENT (INPATIENT)
Facility: HOSPITAL | Age: 55
LOS: 3 days | Discharge: HOME HEALTH | End: 2020-07-16
Attending: HOSPITALIST | Admitting: HOSPITALIST
Payer: MEDICARE

## 2020-07-12 VITALS
RESPIRATION RATE: 18 BRPM | SYSTOLIC BLOOD PRESSURE: 122 MMHG | OXYGEN SATURATION: 97 % | DIASTOLIC BLOOD PRESSURE: 67 MMHG | TEMPERATURE: 99 F | HEART RATE: 67 BPM

## 2020-07-12 LAB
ALBUMIN SERPL ELPH-MCNC: 3.7 G/DL — SIGNIFICANT CHANGE UP (ref 3.5–5.2)
ALP SERPL-CCNC: 111 U/L — SIGNIFICANT CHANGE UP (ref 30–115)
ALT FLD-CCNC: 26 U/L — SIGNIFICANT CHANGE UP (ref 0–41)
ANION GAP SERPL CALC-SCNC: 12 MMOL/L — SIGNIFICANT CHANGE UP (ref 7–14)
AST SERPL-CCNC: 33 U/L — SIGNIFICANT CHANGE UP (ref 0–41)
BASOPHILS # BLD AUTO: 0.03 K/UL — SIGNIFICANT CHANGE UP (ref 0–0.2)
BASOPHILS NFR BLD AUTO: 0.5 % — SIGNIFICANT CHANGE UP (ref 0–1)
BILIRUB SERPL-MCNC: 0.8 MG/DL — SIGNIFICANT CHANGE UP (ref 0.2–1.2)
BUN SERPL-MCNC: 13 MG/DL — SIGNIFICANT CHANGE UP (ref 10–20)
CALCIUM SERPL-MCNC: 9 MG/DL — SIGNIFICANT CHANGE UP (ref 8.5–10.1)
CHLORIDE SERPL-SCNC: 106 MMOL/L — SIGNIFICANT CHANGE UP (ref 98–110)
CO2 SERPL-SCNC: 24 MMOL/L — SIGNIFICANT CHANGE UP (ref 17–32)
CREAT SERPL-MCNC: 0.8 MG/DL — SIGNIFICANT CHANGE UP (ref 0.7–1.5)
EOSINOPHIL # BLD AUTO: 0.06 K/UL — SIGNIFICANT CHANGE UP (ref 0–0.7)
EOSINOPHIL NFR BLD AUTO: 0.9 % — SIGNIFICANT CHANGE UP (ref 0–8)
GLUCOSE BLDC GLUCOMTR-MCNC: 172 MG/DL — HIGH (ref 70–99)
GLUCOSE SERPL-MCNC: 131 MG/DL — HIGH (ref 70–99)
HCG SERPL QL: NEGATIVE — SIGNIFICANT CHANGE UP
HCT VFR BLD CALC: 39.1 % — SIGNIFICANT CHANGE UP (ref 37–47)
HGB BLD-MCNC: 12.1 G/DL — SIGNIFICANT CHANGE UP (ref 12–16)
IMM GRANULOCYTES NFR BLD AUTO: 0.5 % — HIGH (ref 0.1–0.3)
LYMPHOCYTES # BLD AUTO: 1.4 K/UL — SIGNIFICANT CHANGE UP (ref 1.2–3.4)
LYMPHOCYTES # BLD AUTO: 22 % — SIGNIFICANT CHANGE UP (ref 20.5–51.1)
MAGNESIUM SERPL-MCNC: 1.7 MG/DL — LOW (ref 1.8–2.4)
MCHC RBC-ENTMCNC: 27.6 PG — SIGNIFICANT CHANGE UP (ref 27–31)
MCHC RBC-ENTMCNC: 30.9 G/DL — LOW (ref 32–37)
MCV RBC AUTO: 89.1 FL — SIGNIFICANT CHANGE UP (ref 81–99)
MONOCYTES # BLD AUTO: 0.31 K/UL — SIGNIFICANT CHANGE UP (ref 0.1–0.6)
MONOCYTES NFR BLD AUTO: 4.9 % — SIGNIFICANT CHANGE UP (ref 1.7–9.3)
NEUTROPHILS # BLD AUTO: 4.52 K/UL — SIGNIFICANT CHANGE UP (ref 1.4–6.5)
NEUTROPHILS NFR BLD AUTO: 71.2 % — SIGNIFICANT CHANGE UP (ref 42.2–75.2)
NRBC # BLD: 0 /100 WBCS — SIGNIFICANT CHANGE UP (ref 0–0)
NT-PROBNP SERPL-SCNC: 4136 PG/ML — HIGH (ref 0–300)
PLATELET # BLD AUTO: 254 K/UL — SIGNIFICANT CHANGE UP (ref 130–400)
POTASSIUM SERPL-MCNC: 3.8 MMOL/L — SIGNIFICANT CHANGE UP (ref 3.5–5)
POTASSIUM SERPL-SCNC: 3.8 MMOL/L — SIGNIFICANT CHANGE UP (ref 3.5–5)
PROT SERPL-MCNC: 6.2 G/DL — SIGNIFICANT CHANGE UP (ref 6–8)
RBC # BLD: 4.39 M/UL — SIGNIFICANT CHANGE UP (ref 4.2–5.4)
RBC # FLD: 18 % — HIGH (ref 11.5–14.5)
SARS-COV-2 RNA SPEC QL NAA+PROBE: SIGNIFICANT CHANGE UP
SODIUM SERPL-SCNC: 142 MMOL/L — SIGNIFICANT CHANGE UP (ref 135–146)
TROPONIN T SERPL-MCNC: <0.01 NG/ML — SIGNIFICANT CHANGE UP
WBC # BLD: 6.35 K/UL — SIGNIFICANT CHANGE UP (ref 4.8–10.8)
WBC # FLD AUTO: 6.35 K/UL — SIGNIFICANT CHANGE UP (ref 4.8–10.8)

## 2020-07-12 PROCEDURE — 93010 ELECTROCARDIOGRAM REPORT: CPT

## 2020-07-12 PROCEDURE — 71045 X-RAY EXAM CHEST 1 VIEW: CPT | Mod: 26

## 2020-07-12 PROCEDURE — 99285 EMERGENCY DEPT VISIT HI MDM: CPT

## 2020-07-12 RX ORDER — LISINOPRIL 2.5 MG/1
40 TABLET ORAL DAILY
Refills: 0 | Status: DISCONTINUED | OUTPATIENT
Start: 2020-07-12 | End: 2020-07-16

## 2020-07-12 RX ORDER — RISPERIDONE 4 MG/1
1 TABLET ORAL DAILY
Refills: 0 | Status: DISCONTINUED | OUTPATIENT
Start: 2020-07-12 | End: 2020-07-16

## 2020-07-12 RX ORDER — ATORVASTATIN CALCIUM 80 MG/1
80 TABLET, FILM COATED ORAL AT BEDTIME
Refills: 0 | Status: DISCONTINUED | OUTPATIENT
Start: 2020-07-12 | End: 2020-07-16

## 2020-07-12 RX ORDER — ENOXAPARIN SODIUM 100 MG/ML
40 INJECTION SUBCUTANEOUS DAILY
Refills: 0 | Status: DISCONTINUED | OUTPATIENT
Start: 2020-07-12 | End: 2020-07-16

## 2020-07-12 RX ORDER — FUROSEMIDE 40 MG
40 TABLET ORAL ONCE
Refills: 0 | Status: COMPLETED | OUTPATIENT
Start: 2020-07-12 | End: 2020-07-12

## 2020-07-12 RX ORDER — AMLODIPINE BESYLATE 2.5 MG/1
10 TABLET ORAL DAILY
Refills: 0 | Status: DISCONTINUED | OUTPATIENT
Start: 2020-07-12 | End: 2020-07-16

## 2020-07-12 RX ORDER — QUETIAPINE FUMARATE 200 MG/1
100 TABLET, FILM COATED ORAL AT BEDTIME
Refills: 0 | Status: DISCONTINUED | OUTPATIENT
Start: 2020-07-12 | End: 2020-07-16

## 2020-07-12 RX ORDER — POLYETHYLENE GLYCOL 3350 17 G/17G
17 POWDER, FOR SOLUTION ORAL EVERY 12 HOURS
Refills: 0 | Status: DISCONTINUED | OUTPATIENT
Start: 2020-07-12 | End: 2020-07-16

## 2020-07-12 RX ORDER — MAGNESIUM SULFATE 500 MG/ML
2 VIAL (ML) INJECTION ONCE
Refills: 0 | Status: COMPLETED | OUTPATIENT
Start: 2020-07-12 | End: 2020-07-12

## 2020-07-12 RX ORDER — CHOLECALCIFEROL (VITAMIN D3) 125 MCG
50000 CAPSULE ORAL
Refills: 0 | Status: DISCONTINUED | OUTPATIENT
Start: 2020-07-12 | End: 2020-07-12

## 2020-07-12 RX ORDER — MAGNESIUM SULFATE 500 MG/ML
2 VIAL (ML) INJECTION EVERY 4 HOURS
Refills: 0 | Status: COMPLETED | OUTPATIENT
Start: 2020-07-12 | End: 2020-07-13

## 2020-07-12 RX ORDER — ASPIRIN/CALCIUM CARB/MAGNESIUM 324 MG
81 TABLET ORAL DAILY
Refills: 0 | Status: DISCONTINUED | OUTPATIENT
Start: 2020-07-12 | End: 2020-07-16

## 2020-07-12 RX ORDER — ERGOCALCIFEROL 1.25 MG/1
50000 CAPSULE ORAL
Refills: 0 | Status: DISCONTINUED | OUTPATIENT
Start: 2020-07-12 | End: 2020-07-16

## 2020-07-12 RX ORDER — FENOFIBRATE,MICRONIZED 130 MG
145 CAPSULE ORAL DAILY
Refills: 0 | Status: DISCONTINUED | OUTPATIENT
Start: 2020-07-12 | End: 2020-07-16

## 2020-07-12 RX ORDER — CLOPIDOGREL BISULFATE 75 MG/1
75 TABLET, FILM COATED ORAL DAILY
Refills: 0 | Status: DISCONTINUED | OUTPATIENT
Start: 2020-07-12 | End: 2020-07-16

## 2020-07-12 RX ORDER — FUROSEMIDE 40 MG
40 TABLET ORAL DAILY
Refills: 0 | Status: DISCONTINUED | OUTPATIENT
Start: 2020-07-12 | End: 2020-07-14

## 2020-07-12 RX ORDER — OXYBUTYNIN CHLORIDE 5 MG
5 TABLET ORAL
Refills: 0 | Status: DISCONTINUED | OUTPATIENT
Start: 2020-07-12 | End: 2020-07-16

## 2020-07-12 RX ORDER — SENNA PLUS 8.6 MG/1
2 TABLET ORAL AT BEDTIME
Refills: 0 | Status: DISCONTINUED | OUTPATIENT
Start: 2020-07-12 | End: 2020-07-16

## 2020-07-12 RX ORDER — RISPERIDONE 4 MG/1
3 TABLET ORAL AT BEDTIME
Refills: 0 | Status: DISCONTINUED | OUTPATIENT
Start: 2020-07-12 | End: 2020-07-16

## 2020-07-12 RX ORDER — METOPROLOL TARTRATE 50 MG
50 TABLET ORAL
Refills: 0 | Status: DISCONTINUED | OUTPATIENT
Start: 2020-07-12 | End: 2020-07-16

## 2020-07-12 RX ADMIN — Medication 50 MILLIGRAM(S): at 18:28

## 2020-07-12 RX ADMIN — QUETIAPINE FUMARATE 100 MILLIGRAM(S): 200 TABLET, FILM COATED ORAL at 21:58

## 2020-07-12 RX ADMIN — SENNA PLUS 2 TABLET(S): 8.6 TABLET ORAL at 21:58

## 2020-07-12 RX ADMIN — Medication 50 GRAM(S): at 13:41

## 2020-07-12 RX ADMIN — POLYETHYLENE GLYCOL 3350 17 GRAM(S): 17 POWDER, FOR SOLUTION ORAL at 18:28

## 2020-07-12 RX ADMIN — Medication 40 MILLIGRAM(S): at 13:41

## 2020-07-12 RX ADMIN — Medication 30 MILLIGRAM(S): at 18:28

## 2020-07-12 RX ADMIN — Medication 25 GRAM(S): at 22:22

## 2020-07-12 RX ADMIN — RISPERIDONE 3 MILLIGRAM(S): 4 TABLET ORAL at 21:58

## 2020-07-12 RX ADMIN — ATORVASTATIN CALCIUM 80 MILLIGRAM(S): 80 TABLET, FILM COATED ORAL at 22:23

## 2020-07-12 NOTE — ED PROVIDER NOTE - NS ED ROS FT
Eyes:  No visual changes, eye pain or discharge.  ENMT:  No hearing changes, pain, discharge or infections. No neck pain or stiffness.  Cardiac:  + SOB + Edema No chest pain, No chest pain with exertion.  Respiratory:  No cough or respiratory distress. No hemoptysis. No history of asthma or RAD.  GI:  No nausea, vomiting, diarrhea or abdominal pain.  :  No dysuria, frequency or burning.  MS:  No myalgia, muscle weakness, joint pain or back pain.  Neuro:  No headache or weakness.  No LOC.  Skin:  No skin rash.   Endocrine: No history of thyroid disease or diabetes.  Except as documented in the HPI,  all other systems are negative.

## 2020-07-12 NOTE — ED PROVIDER NOTE - PHYSICAL EXAMINATION
VITAL SIGNS: I have reviewed nursing notes and confirm.  CONSTITUTIONAL: Well-developed; well-nourished; mildly tachypneic  SKIN: skin exam is warm and dry, no acute rash.    HEAD: Normocephalic; atraumatic.  EYES: conjunctiva and sclera clear.  ENT: No nasal discharge; airway clear.  NECK: Supple; non tender.  CARD: S1, S2 normal; no murmurs, gallops, or rubs. Regular rate and rhythm.   RESP: + crackles bilaterally, No wheezes, rales or rhonchi.  ABD: Normal bowel sounds; soft; non-distended; non-tender  EXT: Normal ROM.  No clubbing, cyanosis or edema.   LYMPH: No acute cervical adenopathy.  NEURO: Alert, oriented, grossly unremarkable  PSYCH: Cooperative, appropriate.

## 2020-07-12 NOTE — ED PROVIDER NOTE - ATTENDING CONTRIBUTION TO CARE
55F pmh COPD on 2L O2 NC at home, CHF noncompliant w lasix and diet, HTN, HL, p/w SOB since last night and inability to lay flat. states her ankles are swollen and has ORR. feels like prior CHF exac and is requesting IV lasix. No fever, cough. No cp. no abd pain, nvdc. states ate eggs last night which were salty and is noncompliant w lasix because it makes her urinate all day.    on exam, AFVSS, well simon nad, ncat, eomi, perrla, mmm, rales bilat, mild tachypnea, speaking in full sentences, rrr nl s1s2 no mrg, abd soft ntnd, aaox3, no focal deficits, no le calf ttp, +mild ankle edema bilat,     a/p; Concern for CHF exac, labs, ekg/trop, cxr, bnp, lasix prn, admit for diuresis

## 2020-07-12 NOTE — H&P ADULT - HISTORY OF PRESENT ILLNESS
55 year old female with a pmhx of HFrEF 38%, HTN, HLD, COPD on 2L Home O2 presents with SOB for      worsened on exertion and when lying flat. Pt denies fever, chills, cough, n/v/d, CP, abd pain, n/v/d. 55 year old female with a pmhx of HFrEF 38%, HTN, HLD, COPD on 2L Home O2 presents with SOB for 3 days duration.   patient was doing well until 3 days prior to presentation when started SOB, initially with exertion and progressively worsened to be at rest, reports Orthopnea, PND and mild legs swelling. no cough, fever, chest pain, chills or myalgia.   has not been taking her lasix for a week as she ran out of all her medications.  Review of systems is otherwise negative.   In ED   vital signs   122/ 67   67 /min  18 /min  98.7 Degrees F  37.1 Degrees C  oral  97 %  nasal cannula  3 L/min  CXR showed bilateral interstitial edema with mild bilateral effusions, admitted for CHf exacerbation   s/p IV lasix in ED with improvement.

## 2020-07-12 NOTE — H&P ADULT - NSHPPHYSICALEXAM_GEN_ALL_CORE
GENERAL: NAD, lying in bed comfortably  HEAD:  Atraumatic, Normocephalic  EYES: EOMI, PERRLA, conjunctiva and sclera clear  ENT: Moist mucous membranes  NECK: Supple, No JVD  CHEST/LUNG: basal crackles   HEART: Regular rate and rhythm; No murmurs, rubs, or gallops  ABDOMEN: Soft, Nontender, Nondistended. No hepatomegally  EXTREMITIES: No clubbing, cyanosis, or edema  NERVOUS SYSTEM:  Alert & Oriented X3, speech clear. No deficits   MSK: FROM all 4 extremities, full and equal strength  SKIN: No rashes or lesions

## 2020-07-12 NOTE — H&P ADULT - NSHPLABSRESULTS_GEN_ALL_CORE
12.1   6.35  )-----------( 254      ( 12 Jul 2020 11:47 )             39.1       07-12    142  |  106  |  13  ----------------------------<  131<H>  3.8   |  24  |  0.8    Ca    9.0      12 Jul 2020 11:47  Mg     1.7     07-12    TPro  6.2  /  Alb  3.7  /  TBili  0.8  /  DBili  x   /  AST  33  /  ALT  26  /  AlkPhos  111  07-12                      Lactate Trend      CARDIAC MARKERS ( 12 Jul 2020 11:47 )  x     / <0.01 ng/mL / x     / x     / x            CAPILLARY BLOOD GLUCOSE            < from: Xray Chest 1 View- PORTABLE-Urgent (07.12.20 @ 12:14) >      1. Unchanged interstitial edema with small bilateral pleural effusions.    2. Stable cardiomegaly.    < end of copied text >

## 2020-07-12 NOTE — ED PROVIDER NOTE - OBJECTIVE STATEMENT
Pt is a 56y/o female with a pmhx of CHF on lasix, HTN, HLD, COPD on 2L Home O2 presents today for eval fo SOB, worsened on exertion and when lying flat. Pt denies fever, chills, cough, n/v/d, CP, abd pain, n/v/d.

## 2020-07-12 NOTE — H&P ADULT - ASSESSMENT
55 year old female with a pmhx of HFrEF 38%, HTN, HLD, COPD on 2L Home O2 presents with SOB for 3 days duration.       # Exacerbation of HfrEF secondary to non-compliance with medications   oxygen requirement at baseline 2L   CXR showed bilateral interstitial edema with mild bilateral effusions, admitted for CHf exacerbation   s/p IV lasix in ED with improvement.   daily weight   strict Is&Os   Iv lasix 40 mg QD   c/w metoprolol and lisinopril     # HTN c/w Norvasc and lisinopril   # HLD c/w fenofibrate and statin   # COPD on 2L, stable with no wheeze on exam   Dunebs PRN   not on any treatment at home     # DM  - Holding oral hypoglycemic  - BS< 180. Monitor and start Lantus if persistently > 180    # History of CVA with left sided weakness  - continue aspirin + plavix + atrovastatin    # DVT PPX   # DASH Diet   # Full code 55 year old female with a pmhx of HFrEF 38%, HTN, HLD, COPD on 2L Home O2 presents with SOB for 3 days duration.       # Acute on chronic HFrEF  secondary to non-compliance with medications   CXR showed bilateral interstitial edema with mild bilateral effusions,    s/p IV lasix in ED with improvement.   daily weight   strict Is&Os   C/W Iv lasix 40 mg QD   c/w metoprolol and lisinopril     # HTN c/w Norvasc and lisinopril   # HLD c/w fenofibrate and statin   # COPD on 2L, stable with no wheeze on exam   Dunebs PRN   not on any treatment at home     # DM  - Holding oral hypoglycemic  - BS< 180. Monitor and start Lantus if persistently > 180    # History of CVA with left sided weakness  - continue aspirin + plavix + atrovastatin    # DVT PPX   # DASH Diet   # Full code     #Progress Note Handoff  Pending (specify):  Clinical improvement  Family discussion:  Disposition: Home

## 2020-07-12 NOTE — ED ADULT NURSE NOTE - OBJECTIVE STATEMENT
Pt has CC of shortness of breath, states she took her water pill this morning. Pt has hx of CHF & COPD, on home O2.

## 2020-07-13 LAB
GLUCOSE BLDC GLUCOMTR-MCNC: 127 MG/DL — HIGH (ref 70–99)
GLUCOSE BLDC GLUCOMTR-MCNC: 130 MG/DL — HIGH (ref 70–99)
GLUCOSE BLDC GLUCOMTR-MCNC: 150 MG/DL — HIGH (ref 70–99)
GLUCOSE BLDC GLUCOMTR-MCNC: 159 MG/DL — HIGH (ref 70–99)

## 2020-07-13 PROCEDURE — 93010 ELECTROCARDIOGRAM REPORT: CPT

## 2020-07-13 PROCEDURE — 99223 1ST HOSP IP/OBS HIGH 75: CPT | Mod: AI

## 2020-07-13 RX ADMIN — Medication 145 MILLIGRAM(S): at 13:20

## 2020-07-13 RX ADMIN — Medication 30 MILLIGRAM(S): at 05:13

## 2020-07-13 RX ADMIN — Medication 5 MILLIGRAM(S): at 05:13

## 2020-07-13 RX ADMIN — CLOPIDOGREL BISULFATE 75 MILLIGRAM(S): 75 TABLET, FILM COATED ORAL at 13:20

## 2020-07-13 RX ADMIN — RISPERIDONE 3 MILLIGRAM(S): 4 TABLET ORAL at 21:26

## 2020-07-13 RX ADMIN — Medication 25 GRAM(S): at 04:34

## 2020-07-13 RX ADMIN — ATORVASTATIN CALCIUM 80 MILLIGRAM(S): 80 TABLET, FILM COATED ORAL at 21:25

## 2020-07-13 RX ADMIN — QUETIAPINE FUMARATE 100 MILLIGRAM(S): 200 TABLET, FILM COATED ORAL at 21:25

## 2020-07-13 RX ADMIN — POLYETHYLENE GLYCOL 3350 17 GRAM(S): 17 POWDER, FOR SOLUTION ORAL at 05:13

## 2020-07-13 RX ADMIN — Medication 50 MILLIGRAM(S): at 17:32

## 2020-07-13 RX ADMIN — SENNA PLUS 2 TABLET(S): 8.6 TABLET ORAL at 21:26

## 2020-07-13 RX ADMIN — Medication 30 MILLIGRAM(S): at 17:33

## 2020-07-13 RX ADMIN — Medication 40 MILLIGRAM(S): at 05:13

## 2020-07-13 RX ADMIN — Medication 50 MILLIGRAM(S): at 05:13

## 2020-07-13 RX ADMIN — LISINOPRIL 40 MILLIGRAM(S): 2.5 TABLET ORAL at 05:13

## 2020-07-13 RX ADMIN — AMLODIPINE BESYLATE 10 MILLIGRAM(S): 2.5 TABLET ORAL at 05:13

## 2020-07-13 RX ADMIN — ENOXAPARIN SODIUM 40 MILLIGRAM(S): 100 INJECTION SUBCUTANEOUS at 12:07

## 2020-07-13 RX ADMIN — Medication 81 MILLIGRAM(S): at 12:07

## 2020-07-13 RX ADMIN — RISPERIDONE 1 MILLIGRAM(S): 4 TABLET ORAL at 13:19

## 2020-07-13 RX ADMIN — ERGOCALCIFEROL 50000 UNIT(S): 1.25 CAPSULE ORAL at 13:20

## 2020-07-13 NOTE — PROGRESS NOTE ADULT - SUBJECTIVE AND OBJECTIVE BOX
WILLIAN MARY 55y Female  MRN#: [id number]   CODE STATUS: FULL      SUBJECTIVE  Patient is a 55y old Female who presents with a chief complaint of SOB (12 Jul 2020 15:33)    Currently admitted to medicine with the primary diagnosis of CHF exacerbation     Today is hospital day 1d, and this morning she is laying in bed comfortably and reports no overnight events. Pt feeling well today, still some SOB worse than baseline. Also complaining of constipation x3 days. Otherwise no complaints.    OBJECTIVE  PAST MEDICAL & SURGICAL HISTORY  CKD (chronic kidney disease), stage II  Type 2 diabetes mellitus  Cerebrovascular accident (CVA): Multiple  CHF (congestive heart failure)  COPD, severe  Anxiety and depression  Hyperlipidemia  Hypertension  No significant past surgical history    ALLERGIES:  No Known Allergies    MEDICATIONS:  STANDING MEDICATIONS  amLODIPine   Tablet 10 milliGRAM(s) Oral daily  aspirin  chewable 81 milliGRAM(s) Oral daily  atorvastatin 80 milliGRAM(s) Oral at bedtime  busPIRone 30 milliGRAM(s) Oral two times a day  clopidogrel Tablet 75 milliGRAM(s) Oral daily  enoxaparin Injectable 40 milliGRAM(s) SubCutaneous daily  ergocalciferol 48295 Unit(s) Oral every week  fenofibrate Tablet 145 milliGRAM(s) Oral daily  furosemide   Injectable 40 milliGRAM(s) IV Push daily  lisinopril 40 milliGRAM(s) Oral daily  metoprolol tartrate 50 milliGRAM(s) Oral two times a day  oxybutynin 5 milliGRAM(s) Oral two times a day  polyethylene glycol 3350 17 Gram(s) Oral every 12 hours  QUEtiapine 100 milliGRAM(s) Oral at bedtime  risperiDONE   Tablet 1 milliGRAM(s) Oral daily  risperiDONE   Tablet 3 milliGRAM(s) Oral at bedtime  senna 2 Tablet(s) Oral at bedtime    PRN MEDICATIONS      VITAL SIGNS: Last 24 Hours  T(C): 35.9 (13 Jul 2020 08:00), Max: 36.7 (12 Jul 2020 17:56)  T(F): 96.6 (13 Jul 2020 08:00), Max: 98.1 (12 Jul 2020 17:56)  HR: 58 (13 Jul 2020 08:00) (58 - 80)  BP: 118/77 (13 Jul 2020 08:00) (118/77 - 156/90)  BP(mean): --  RR: 18 (13 Jul 2020 08:00) (18 - 18)  SpO2: 97% (13 Jul 2020 05:00) (95% - 98%)      LABS:                        12.1   6.35  )-----------( 254      ( 12 Jul 2020 11:47 )             39.1     07-12    142  |  106  |  13  ----------------------------<  131<H>  3.8   |  24  |  0.8    Ca    9.0      12 Jul 2020 11:47  Mg     1.7     07-12    TPro  6.2  /  Alb  3.7  /  TBili  0.8  /  DBili  x   /  AST  33  /  ALT  26  /  AlkPhos  111  07-12        CARDIAC MARKERS ( 12 Jul 2020 11:47 )  x     / <0.01 ng/mL / x     / x     / x          RADIOLOGY:    < from: Xray Chest 1 View- PORTABLE-Urgent (07.12.20 @ 12:14) >  Impression:      1. Unchanged interstitial edema with small bilateral pleural effusions.    2. Stable cardiomegaly.    < end of copied text >      PHYSICAL EXAM:    GENERAL: NAD, well-developed, AAOx3  HEENT: Atraumatic, Normocephalic. EOMI, conjunctiva and sclera clear, No JVD  PULMONARY: mild bibasilar crackles; No wheeze  CARDIOVASCULAR: Regular rate and rhythm; No murmurs, rubs, or gallops  GASTROINTESTINAL: Soft, Nontender, Nondistended; Bowel sounds present  MUSCULOSKELETAL:  no cyanosis. Trace BL LE edema  NEUROLOGY: LUE and LLE decreased strength and ROM  SKIN: No rashes or lesions      ASSESSMENT & PLAN    55 year old female with a pmhx of HFrEF 38%, HTN, HLD, COPD on 2L Home O2 presents with SOB for 3 days duration.     # Exacerbation of HfrEF secondary to non-compliance of medications/fluid intake   - oxygen requirement at baseline 2L   - CXR w bilateral interstitial edema with mild bilateral effusions, f/u AM CXR  - daily weight   - strict Is&Os w fluid restriction  - continue IV lasix 40 mg QD   - c/w metoprolol and lisinopril     # HTN- controlled  - c/w Norvasc and lisinopril     # HLD  - c/w fenofibrate and statin     # COPD on 2L NC, stable with no wheeze on exam   - Duonebs PRN if needed  - not on any treatment at home     # DM  - Holding oral hypoglycemic  - BS< 180. Monitor and start Lantus if persistently > 180    # History of CVA with left sided weakness  - continue aspirin + plavix + atorvastatin    # DVT PPX   # DASH Diet , with 1500ml fluid restriction  # Full code

## 2020-07-14 LAB
ALBUMIN SERPL ELPH-MCNC: 3.5 G/DL — SIGNIFICANT CHANGE UP (ref 3.5–5.2)
ALP SERPL-CCNC: 78 U/L — SIGNIFICANT CHANGE UP (ref 30–115)
ALT FLD-CCNC: 15 U/L — SIGNIFICANT CHANGE UP (ref 0–41)
ANION GAP SERPL CALC-SCNC: 11 MMOL/L — SIGNIFICANT CHANGE UP (ref 7–14)
AST SERPL-CCNC: 13 U/L — SIGNIFICANT CHANGE UP (ref 0–41)
BASOPHILS # BLD AUTO: 0.06 K/UL — SIGNIFICANT CHANGE UP (ref 0–0.2)
BASOPHILS NFR BLD AUTO: 1.1 % — HIGH (ref 0–1)
BILIRUB SERPL-MCNC: 0.5 MG/DL — SIGNIFICANT CHANGE UP (ref 0.2–1.2)
BUN SERPL-MCNC: 18 MG/DL — SIGNIFICANT CHANGE UP (ref 10–20)
CALCIUM SERPL-MCNC: 8.8 MG/DL — SIGNIFICANT CHANGE UP (ref 8.5–10.1)
CHLORIDE SERPL-SCNC: 102 MMOL/L — SIGNIFICANT CHANGE UP (ref 98–110)
CO2 SERPL-SCNC: 29 MMOL/L — SIGNIFICANT CHANGE UP (ref 17–32)
CREAT SERPL-MCNC: 1 MG/DL — SIGNIFICANT CHANGE UP (ref 0.7–1.5)
EOSINOPHIL # BLD AUTO: 0.05 K/UL — SIGNIFICANT CHANGE UP (ref 0–0.7)
EOSINOPHIL NFR BLD AUTO: 0.9 % — SIGNIFICANT CHANGE UP (ref 0–8)
GLUCOSE BLDC GLUCOMTR-MCNC: 141 MG/DL — HIGH (ref 70–99)
GLUCOSE BLDC GLUCOMTR-MCNC: 146 MG/DL — HIGH (ref 70–99)
GLUCOSE BLDC GLUCOMTR-MCNC: 155 MG/DL — HIGH (ref 70–99)
GLUCOSE BLDC GLUCOMTR-MCNC: 157 MG/DL — HIGH (ref 70–99)
GLUCOSE SERPL-MCNC: 153 MG/DL — HIGH (ref 70–99)
HCT VFR BLD CALC: 35.7 % — LOW (ref 37–47)
HGB BLD-MCNC: 11.1 G/DL — LOW (ref 12–16)
IMM GRANULOCYTES NFR BLD AUTO: 0.4 % — HIGH (ref 0.1–0.3)
LYMPHOCYTES # BLD AUTO: 1.49 K/UL — SIGNIFICANT CHANGE UP (ref 1.2–3.4)
LYMPHOCYTES # BLD AUTO: 28 % — SIGNIFICANT CHANGE UP (ref 20.5–51.1)
MAGNESIUM SERPL-MCNC: 2.1 MG/DL — SIGNIFICANT CHANGE UP (ref 1.8–2.4)
MCHC RBC-ENTMCNC: 28.3 PG — SIGNIFICANT CHANGE UP (ref 27–31)
MCHC RBC-ENTMCNC: 31.1 G/DL — LOW (ref 32–37)
MCV RBC AUTO: 91.1 FL — SIGNIFICANT CHANGE UP (ref 81–99)
MONOCYTES # BLD AUTO: 0.27 K/UL — SIGNIFICANT CHANGE UP (ref 0.1–0.6)
MONOCYTES NFR BLD AUTO: 5.1 % — SIGNIFICANT CHANGE UP (ref 1.7–9.3)
NEUTROPHILS # BLD AUTO: 3.44 K/UL — SIGNIFICANT CHANGE UP (ref 1.4–6.5)
NEUTROPHILS NFR BLD AUTO: 64.5 % — SIGNIFICANT CHANGE UP (ref 42.2–75.2)
NRBC # BLD: 0 /100 WBCS — SIGNIFICANT CHANGE UP (ref 0–0)
PLATELET # BLD AUTO: 239 K/UL — SIGNIFICANT CHANGE UP (ref 130–400)
POTASSIUM SERPL-MCNC: 3.9 MMOL/L — SIGNIFICANT CHANGE UP (ref 3.5–5)
POTASSIUM SERPL-SCNC: 3.9 MMOL/L — SIGNIFICANT CHANGE UP (ref 3.5–5)
PROT SERPL-MCNC: 5.5 G/DL — LOW (ref 6–8)
RBC # BLD: 3.92 M/UL — LOW (ref 4.2–5.4)
RBC # FLD: 17.8 % — HIGH (ref 11.5–14.5)
SODIUM SERPL-SCNC: 142 MMOL/L — SIGNIFICANT CHANGE UP (ref 135–146)
WBC # BLD: 5.33 K/UL — SIGNIFICANT CHANGE UP (ref 4.8–10.8)
WBC # FLD AUTO: 5.33 K/UL — SIGNIFICANT CHANGE UP (ref 4.8–10.8)

## 2020-07-14 PROCEDURE — 99233 SBSQ HOSP IP/OBS HIGH 50: CPT

## 2020-07-14 PROCEDURE — 71045 X-RAY EXAM CHEST 1 VIEW: CPT | Mod: 26

## 2020-07-14 RX ORDER — FUROSEMIDE 40 MG
40 TABLET ORAL
Refills: 0 | Status: DISCONTINUED | OUTPATIENT
Start: 2020-07-14 | End: 2020-07-15

## 2020-07-14 RX ORDER — ALBUTEROL 90 UG/1
1.25 AEROSOL, METERED ORAL EVERY 6 HOURS
Refills: 0 | Status: DISCONTINUED | OUTPATIENT
Start: 2020-07-14 | End: 2020-07-16

## 2020-07-14 RX ORDER — ALBUTEROL 90 UG/1
2.5 AEROSOL, METERED ORAL EVERY 6 HOURS
Refills: 0 | Status: DISCONTINUED | OUTPATIENT
Start: 2020-07-14 | End: 2020-07-14

## 2020-07-14 RX ORDER — BUDESONIDE AND FORMOTEROL FUMARATE DIHYDRATE 160; 4.5 UG/1; UG/1
2 AEROSOL RESPIRATORY (INHALATION)
Refills: 0 | Status: DISCONTINUED | OUTPATIENT
Start: 2020-07-14 | End: 2020-07-16

## 2020-07-14 RX ADMIN — SENNA PLUS 2 TABLET(S): 8.6 TABLET ORAL at 21:55

## 2020-07-14 RX ADMIN — Medication 81 MILLIGRAM(S): at 11:32

## 2020-07-14 RX ADMIN — Medication 5 MILLIGRAM(S): at 17:04

## 2020-07-14 RX ADMIN — Medication 30 MILLIGRAM(S): at 17:03

## 2020-07-14 RX ADMIN — Medication 30 MILLIGRAM(S): at 05:41

## 2020-07-14 RX ADMIN — Medication 50 MILLIGRAM(S): at 05:41

## 2020-07-14 RX ADMIN — QUETIAPINE FUMARATE 100 MILLIGRAM(S): 200 TABLET, FILM COATED ORAL at 21:54

## 2020-07-14 RX ADMIN — Medication 40 MILLIGRAM(S): at 17:04

## 2020-07-14 RX ADMIN — ENOXAPARIN SODIUM 40 MILLIGRAM(S): 100 INJECTION SUBCUTANEOUS at 11:32

## 2020-07-14 RX ADMIN — BUDESONIDE AND FORMOTEROL FUMARATE DIHYDRATE 2 PUFF(S): 160; 4.5 AEROSOL RESPIRATORY (INHALATION) at 21:54

## 2020-07-14 RX ADMIN — LISINOPRIL 40 MILLIGRAM(S): 2.5 TABLET ORAL at 05:41

## 2020-07-14 RX ADMIN — ATORVASTATIN CALCIUM 80 MILLIGRAM(S): 80 TABLET, FILM COATED ORAL at 21:54

## 2020-07-14 RX ADMIN — RISPERIDONE 3 MILLIGRAM(S): 4 TABLET ORAL at 21:55

## 2020-07-14 RX ADMIN — POLYETHYLENE GLYCOL 3350 17 GRAM(S): 17 POWDER, FOR SOLUTION ORAL at 17:04

## 2020-07-14 RX ADMIN — RISPERIDONE 1 MILLIGRAM(S): 4 TABLET ORAL at 11:33

## 2020-07-14 RX ADMIN — AMLODIPINE BESYLATE 10 MILLIGRAM(S): 2.5 TABLET ORAL at 05:41

## 2020-07-14 RX ADMIN — Medication 145 MILLIGRAM(S): at 11:32

## 2020-07-14 RX ADMIN — CLOPIDOGREL BISULFATE 75 MILLIGRAM(S): 75 TABLET, FILM COATED ORAL at 11:32

## 2020-07-14 RX ADMIN — Medication 50 MILLIGRAM(S): at 17:03

## 2020-07-14 RX ADMIN — Medication 40 MILLIGRAM(S): at 05:41

## 2020-07-14 NOTE — PROGRESS NOTE ADULT - SUBJECTIVE AND OBJECTIVE BOX
Hospital Day:  2d    Patient is a 55y old  Female who presents with a chief complaint of 2d    Subjective:  pt seen and examined at bedside. She reports no improvement in her respiratory state and endorses shortness of breath  Past Medical Hx:   CKD (chronic kidney disease), stage II  Type 2 diabetes mellitus  Cerebrovascular accident (CVA)  CHF (congestive heart failure)  COPD, severe  Anxiety and depression  Hyperlipidemia  Hypertension    Past Sx:  No significant past surgical history    Allergies:  No Known Allergies    Current Meds:   Standng Meds:  amLODIPine   Tablet 10 milliGRAM(s) Oral daily  aspirin  chewable 81 milliGRAM(s) Oral daily  atorvastatin 80 milliGRAM(s) Oral at bedtime  budesonide 160 MICROgram(s)/formoterol 4.5 MICROgram(s) Inhaler 2 Puff(s) Inhalation two times a day  busPIRone 30 milliGRAM(s) Oral two times a day  clopidogrel Tablet 75 milliGRAM(s) Oral daily  enoxaparin Injectable 40 milliGRAM(s) SubCutaneous daily  ergocalciferol 20516 Unit(s) Oral every week  fenofibrate Tablet 145 milliGRAM(s) Oral daily  furosemide   Injectable 40 milliGRAM(s) IV Push two times a day  lisinopril 40 milliGRAM(s) Oral daily  metoprolol tartrate 50 milliGRAM(s) Oral two times a day  oxybutynin 5 milliGRAM(s) Oral two times a day  polyethylene glycol 3350 17 Gram(s) Oral every 12 hours  QUEtiapine 100 milliGRAM(s) Oral at bedtime  risperiDONE   Tablet 1 milliGRAM(s) Oral daily  risperiDONE   Tablet 3 milliGRAM(s) Oral at bedtime  senna 2 Tablet(s) Oral at bedtime    PRN Meds:  ALBUTerol   0.042% 1.25 milliGRAM(s) Nebulizer every 6 hours PRN Shortness of Breath and/or Wheezing  bisacodyl Suppository 10 milliGRAM(s) Rectal daily PRN Constipation    HOME MEDICATIONS:  aspirin 81 mg oral tablet: 1 tab(s) orally once a day  BuSpar 10 mg oral tablet: 3 tab(s) orally 2 times a day  doxepin 50 mg oral capsule: 1 cap(s) orally once a day  fenofibrate 145 mg oral tablet: 1 tab(s) orally once a day  glipiZIDE 10 mg oral tablet: 1 tab(s) orally 2 times a day  Lasix 40 mg oral tablet: 1 tab(s) orally 2 times a day  Lipitor 80 mg oral tablet: 1 tab(s) orally once a day  lisinopril 40 mg oral tablet: 1 tab(s) orally once a day  metFORMIN 1000 mg oral tablet: 1 tab(s) orally 2 times a day  metoprolol tartrate 50 mg oral tablet: 1 tab(s) orally 2 times a day  Norvasc 10 mg oral tablet: 1 tab(s) orally once a day  oxybutynin 5 mg oral tablet: 1 tab(s) orally 2 times a day  Plavix 75 mg oral tablet: 1 tab(s) orally once a day  RisperDAL 1 mg oral tablet: 1 tab(s) orally once a day in AM  RisperDAL 3 mg oral tablet: 1 tab(s) orally once a day (at bedtime)  SEROquel 100 mg oral tablet: 1 tab(s) orally once a day (at bedtime)  Vitamin D3 50,000 intl units (1250 mcg) oral capsule: 1 cap(s) orally once a week      Vital Signs:   T(F): 97.5 (07-14-20 @ 07:40), Max: 98.1 (07-14-20 @ 00:00)  HR: 70 (07-14-20 @ 07:40) (65 - 77)  BP: 123/88 (07-14-20 @ 07:40) (123/88 - 160/90)  RR: 18 (07-14-20 @ 07:40) (18 - 18)  SpO2: 96% (07-14-20 @ 00:00) (96% - 97%)      07-13-20 @ 07:01  -  07-14-20 @ 07:00  --------------------------------------------------------  IN: 650 mL / OUT: 1052 mL / NET: -402 mL    07-14-20 @ 07:01  -  07-14-20 @ 10:33  --------------------------------------------------------  IN: 240 mL / OUT: 0 mL / NET: 240 mL        Physical Exam:   GENERAL: NAD  HEENT: NCAT  CHEST/LUNG: presence of crackles bilaterally  HEART: Regular rate and rhythm; s1 s2 appreciated, No murmurs, rubs, or gallops  ABDOMEN: Soft, Nontender, Nondistended; Bowel sounds present  EXTREMITIES: No LE edema b/l  SKIN: no rashes, no new lesions  NERVOUS SYSTEM:  Alert & Oriented X3  LINES/CATHETERS: O2 3LPM NC        Labs:                         11.1   5.33  )-----------( 239      ( 14 Jul 2020 04:49 )             35.7     Neutophil% 64.5, Lymphocyte% 28.0, Monocyte% 5.1, Bands% 0.4 07-14-20 @ 04:49    14 Jul 2020 04:49    142    |  102    |  18     ----------------------------<  153    3.9     |  29     |  1.0      Ca    8.8        14 Jul 2020 04:49  Mg     2.1       14 Jul 2020 04:49    TPro  5.5    /  Alb  3.5    /  TBili  0.5    /  DBili  x      /  AST  13     /  ALT  15     /  AlkPhos  78     14 Jul 2020 04:49            Serum Pro-Brain Natriuretic Peptide: 4136 pg/mL (07-12-20 @ 11:47)    Troponin <0.01, CKMB --, CK -- 07-12-20 @ 11:47      Radiology:   < from: Xray Chest 1 View- PORTABLE-Urgent (07.14.20 @ 08:51) >    Impression:      Cardiomegaly with CHF.        < end of copied text >      Assessment and Plan:     DVT ppx:    GI ppx:     Code Status:     DISPO: Hospital Day:  2d    Patient is a 55y old  Female who presents with a chief complaint of 2d    Subjective:  pt seen and examined at bedside. She reports no improvement in her respiratory state and endorses shortness of breath at rest. Still on O2 2 LPM NC.   CXR showed congestion.   She endorses constipation.     Past Medical Hx:   CKD (chronic kidney disease), stage II  Type 2 diabetes mellitus  Cerebrovascular accident (CVA)  CHF (congestive heart failure)  COPD, severe  Anxiety and depression  Hyperlipidemia  Hypertension    Past Sx:  No significant past surgical history    Allergies:  No Known Allergies    Current Meds:   Standng Meds:  amLODIPine   Tablet 10 milliGRAM(s) Oral daily  aspirin  chewable 81 milliGRAM(s) Oral daily  atorvastatin 80 milliGRAM(s) Oral at bedtime  budesonide 160 MICROgram(s)/formoterol 4.5 MICROgram(s) Inhaler 2 Puff(s) Inhalation two times a day  busPIRone 30 milliGRAM(s) Oral two times a day  clopidogrel Tablet 75 milliGRAM(s) Oral daily  enoxaparin Injectable 40 milliGRAM(s) SubCutaneous daily  ergocalciferol 26694 Unit(s) Oral every week  fenofibrate Tablet 145 milliGRAM(s) Oral daily  furosemide   Injectable 40 milliGRAM(s) IV Push two times a day  lisinopril 40 milliGRAM(s) Oral daily  metoprolol tartrate 50 milliGRAM(s) Oral two times a day  oxybutynin 5 milliGRAM(s) Oral two times a day  polyethylene glycol 3350 17 Gram(s) Oral every 12 hours  QUEtiapine 100 milliGRAM(s) Oral at bedtime  risperiDONE   Tablet 1 milliGRAM(s) Oral daily  risperiDONE   Tablet 3 milliGRAM(s) Oral at bedtime  senna 2 Tablet(s) Oral at bedtime    PRN Meds:  ALBUTerol   0.042% 1.25 milliGRAM(s) Nebulizer every 6 hours PRN Shortness of Breath and/or Wheezing  bisacodyl Suppository 10 milliGRAM(s) Rectal daily PRN Constipation    HOME MEDICATIONS:  aspirin 81 mg oral tablet: 1 tab(s) orally once a day  BuSpar 10 mg oral tablet: 3 tab(s) orally 2 times a day  doxepin 50 mg oral capsule: 1 cap(s) orally once a day  fenofibrate 145 mg oral tablet: 1 tab(s) orally once a day  glipiZIDE 10 mg oral tablet: 1 tab(s) orally 2 times a day  Lasix 40 mg oral tablet: 1 tab(s) orally 2 times a day  Lipitor 80 mg oral tablet: 1 tab(s) orally once a day  lisinopril 40 mg oral tablet: 1 tab(s) orally once a day  metFORMIN 1000 mg oral tablet: 1 tab(s) orally 2 times a day  metoprolol tartrate 50 mg oral tablet: 1 tab(s) orally 2 times a day  Norvasc 10 mg oral tablet: 1 tab(s) orally once a day  oxybutynin 5 mg oral tablet: 1 tab(s) orally 2 times a day  Plavix 75 mg oral tablet: 1 tab(s) orally once a day  RisperDAL 1 mg oral tablet: 1 tab(s) orally once a day in AM  RisperDAL 3 mg oral tablet: 1 tab(s) orally once a day (at bedtime)  SEROquel 100 mg oral tablet: 1 tab(s) orally once a day (at bedtime)  Vitamin D3 50,000 intl units (1250 mcg) oral capsule: 1 cap(s) orally once a week      Vital Signs:   T(F): 97.5 (07-14-20 @ 07:40), Max: 98.1 (07-14-20 @ 00:00)  HR: 70 (07-14-20 @ 07:40) (65 - 77)  BP: 123/88 (07-14-20 @ 07:40) (123/88 - 160/90)  RR: 18 (07-14-20 @ 07:40) (18 - 18)  SpO2: 96% (07-14-20 @ 00:00) (96% - 97%)      07-13-20 @ 07:01  -  07-14-20 @ 07:00  --------------------------------------------------------  IN: 650 mL / OUT: 1052 mL / NET: -402 mL    07-14-20 @ 07:01  -  07-14-20 @ 10:33  --------------------------------------------------------  IN: 240 mL / OUT: 0 mL / NET: 240 mL        Physical Exam:   GENERAL: NAD  HEENT: NCAT  CHEST/LUNG: presence of crackles bilaterally  HEART: Regular rate and rhythm; s1 s2 appreciated, No murmurs, rubs, or gallops  ABDOMEN: Soft, Nontender, Nondistended; Bowel sounds present  EXTREMITIES: No LE edema b/l  SKIN: no rashes, no new lesions  NERVOUS SYSTEM:  Alert & Oriented X3  LINES/CATHETERS: O2 3LPM NC        Labs:                         11.1   5.33  )-----------( 239      ( 14 Jul 2020 04:49 )             35.7     Neutophil% 64.5, Lymphocyte% 28.0, Monocyte% 5.1, Bands% 0.4 07-14-20 @ 04:49    14 Jul 2020 04:49    142    |  102    |  18     ----------------------------<  153    3.9     |  29     |  1.0      Ca    8.8        14 Jul 2020 04:49  Mg     2.1       14 Jul 2020 04:49    TPro  5.5    /  Alb  3.5    /  TBili  0.5    /  DBili  x      /  AST  13     /  ALT  15     /  AlkPhos  78     14 Jul 2020 04:49            Serum Pro-Brain Natriuretic Peptide: 4136 pg/mL (07-12-20 @ 11:47)    Troponin <0.01, CKMB --, CK -- 07-12-20 @ 11:47      Radiology:   < from: Xray Chest 1 View- PORTABLE-Urgent (07.14.20 @ 08:51) >    Impression:      Cardiomegaly with CHF.        < end of copied text >    Assessment and Plan:     55 year old female with a pmhx of HFrEF 38%, HTN, HLD, COPD on 2L Home O2 presents with SOB for 3 days duration.     # Exacerbation of HfrEF secondary to non-compliance of medications/fluid intake   - oxygen requirement at baseline 2L   - CXR w bilateral interstitial edema with mild bilateral effusions, CXR this morning showed congestion.   - daily weight   - strict Is&Os w fluid restriction  - Will give Lasix 80 this mornig and will increase total IV lasix 40 mg BID, consider switching to po tomorrow if improvement in overall status.   - Will provide patient with incentive spirometer.   - c/w metoprolol and lisinopril     # HTN- controlled  - c/w Norvasc and lisinopril     # HLD  - c/w fenofibrate and statin     # COPD on 2L NC, stable with no wheeze on exam   - Duonebs PRN if needed  - not on any treatment at home  - Added Symbicort today , to continue at home.   -F/U pulmonary.     # DM  - Holding oral hypoglycemic  - BS< 180. Monitor and start Lantus if persistently > 180    # History of CVA with left sided weakness  - continue aspirin + plavix + atorvastatin    # DVT PPX   # DASH Diet , with 1500ml fluid restriction  # Full code   # DISPO: Acute

## 2020-07-14 NOTE — PROGRESS NOTE ADULT - ATTENDING COMMENTS
55 year old female with a pmhx of HFrEF 38%, HTN, HLD, COPD on 2L Home O2 presents with SOB for 3 days duration    pt seen and examined independently states her breathing as improved but she is not back to her baseline yet    #SOB 2/2 acute exacerbation of HFrEF 2/2 medication non compliance   EF 38% (echo 2/2020)  -lasix 40mg IVP q12h re-eval tomorrow for transition to PO   -strict I/Os  -daily weights  -limit PO intake to 1.5L  -f/u repeat CXR  -cont O2 to maintain sat >92%    #COPD   start symbicort 2 puffs q12h    remainder of plan as per note above    Progress Note Handoff  Pending:  iv diuresis  Patient/Family discussion: POC discussed with pt no questions at this time  Disposition: from home likely 48hrs

## 2020-07-14 NOTE — GOALS OF CARE CONVERSATION - ADVANCED CARE PLANNING - CONVERSATION DETAILS
Discussed with patient regarding her diagnosis and Goals of care.  She wants to live as long as possible and wants to be Full code for now.  Her brother is her surrogate decision maker, they don't have legal paperwork but will set it up when she leaves the hospital

## 2020-07-15 LAB
ANION GAP SERPL CALC-SCNC: 12 MMOL/L — SIGNIFICANT CHANGE UP (ref 7–14)
BASOPHILS # BLD AUTO: 0.05 K/UL — SIGNIFICANT CHANGE UP (ref 0–0.2)
BASOPHILS NFR BLD AUTO: 1 % — SIGNIFICANT CHANGE UP (ref 0–1)
BUN SERPL-MCNC: 18 MG/DL — SIGNIFICANT CHANGE UP (ref 10–20)
CALCIUM SERPL-MCNC: 9.2 MG/DL — SIGNIFICANT CHANGE UP (ref 8.5–10.1)
CHLORIDE SERPL-SCNC: 102 MMOL/L — SIGNIFICANT CHANGE UP (ref 98–110)
CO2 SERPL-SCNC: 30 MMOL/L — SIGNIFICANT CHANGE UP (ref 17–32)
CREAT SERPL-MCNC: 0.9 MG/DL — SIGNIFICANT CHANGE UP (ref 0.7–1.5)
EOSINOPHIL # BLD AUTO: 0.05 K/UL — SIGNIFICANT CHANGE UP (ref 0–0.7)
EOSINOPHIL NFR BLD AUTO: 1 % — SIGNIFICANT CHANGE UP (ref 0–8)
GLUCOSE BLDC GLUCOMTR-MCNC: 137 MG/DL — HIGH (ref 70–99)
GLUCOSE BLDC GLUCOMTR-MCNC: 162 MG/DL — HIGH (ref 70–99)
GLUCOSE BLDC GLUCOMTR-MCNC: 164 MG/DL — HIGH (ref 70–99)
GLUCOSE BLDC GLUCOMTR-MCNC: 213 MG/DL — HIGH (ref 70–99)
GLUCOSE SERPL-MCNC: 148 MG/DL — HIGH (ref 70–99)
HCT VFR BLD CALC: 38.6 % — SIGNIFICANT CHANGE UP (ref 37–47)
HGB BLD-MCNC: 12.1 G/DL — SIGNIFICANT CHANGE UP (ref 12–16)
IMM GRANULOCYTES NFR BLD AUTO: 0.4 % — HIGH (ref 0.1–0.3)
LYMPHOCYTES # BLD AUTO: 1.58 K/UL — SIGNIFICANT CHANGE UP (ref 1.2–3.4)
LYMPHOCYTES # BLD AUTO: 32.2 % — SIGNIFICANT CHANGE UP (ref 20.5–51.1)
MAGNESIUM SERPL-MCNC: 1.7 MG/DL — LOW (ref 1.8–2.4)
MCHC RBC-ENTMCNC: 28.1 PG — SIGNIFICANT CHANGE UP (ref 27–31)
MCHC RBC-ENTMCNC: 31.3 G/DL — LOW (ref 32–37)
MCV RBC AUTO: 89.6 FL — SIGNIFICANT CHANGE UP (ref 81–99)
MONOCYTES # BLD AUTO: 0.29 K/UL — SIGNIFICANT CHANGE UP (ref 0.1–0.6)
MONOCYTES NFR BLD AUTO: 5.9 % — SIGNIFICANT CHANGE UP (ref 1.7–9.3)
NEUTROPHILS # BLD AUTO: 2.91 K/UL — SIGNIFICANT CHANGE UP (ref 1.4–6.5)
NEUTROPHILS NFR BLD AUTO: 59.5 % — SIGNIFICANT CHANGE UP (ref 42.2–75.2)
NRBC # BLD: 0 /100 WBCS — SIGNIFICANT CHANGE UP (ref 0–0)
PLATELET # BLD AUTO: 276 K/UL — SIGNIFICANT CHANGE UP (ref 130–400)
POTASSIUM SERPL-MCNC: 3.9 MMOL/L — SIGNIFICANT CHANGE UP (ref 3.5–5)
POTASSIUM SERPL-SCNC: 3.9 MMOL/L — SIGNIFICANT CHANGE UP (ref 3.5–5)
RBC # BLD: 4.31 M/UL — SIGNIFICANT CHANGE UP (ref 4.2–5.4)
RBC # FLD: 17.7 % — HIGH (ref 11.5–14.5)
SODIUM SERPL-SCNC: 144 MMOL/L — SIGNIFICANT CHANGE UP (ref 135–146)
WBC # BLD: 4.9 K/UL — SIGNIFICANT CHANGE UP (ref 4.8–10.8)
WBC # FLD AUTO: 4.9 K/UL — SIGNIFICANT CHANGE UP (ref 4.8–10.8)

## 2020-07-15 PROCEDURE — 99233 SBSQ HOSP IP/OBS HIGH 50: CPT

## 2020-07-15 RX ORDER — FUROSEMIDE 40 MG
40 TABLET ORAL DAILY
Refills: 0 | Status: DISCONTINUED | OUTPATIENT
Start: 2020-07-15 | End: 2020-07-16

## 2020-07-15 RX ORDER — FUROSEMIDE 40 MG
40 TABLET ORAL ONCE
Refills: 0 | Status: COMPLETED | OUTPATIENT
Start: 2020-07-15 | End: 2020-07-15

## 2020-07-15 RX ADMIN — Medication 81 MILLIGRAM(S): at 11:43

## 2020-07-15 RX ADMIN — Medication 30 MILLIGRAM(S): at 18:30

## 2020-07-15 RX ADMIN — RISPERIDONE 1 MILLIGRAM(S): 4 TABLET ORAL at 11:43

## 2020-07-15 RX ADMIN — RISPERIDONE 3 MILLIGRAM(S): 4 TABLET ORAL at 21:31

## 2020-07-15 RX ADMIN — Medication 40 MILLIGRAM(S): at 05:27

## 2020-07-15 RX ADMIN — Medication 40 MILLIGRAM(S): at 13:25

## 2020-07-15 RX ADMIN — LISINOPRIL 40 MILLIGRAM(S): 2.5 TABLET ORAL at 05:26

## 2020-07-15 RX ADMIN — AMLODIPINE BESYLATE 10 MILLIGRAM(S): 2.5 TABLET ORAL at 05:26

## 2020-07-15 RX ADMIN — Medication 50 MILLIGRAM(S): at 18:30

## 2020-07-15 RX ADMIN — Medication 30 MILLIGRAM(S): at 05:26

## 2020-07-15 RX ADMIN — ENOXAPARIN SODIUM 40 MILLIGRAM(S): 100 INJECTION SUBCUTANEOUS at 11:43

## 2020-07-15 RX ADMIN — SENNA PLUS 2 TABLET(S): 8.6 TABLET ORAL at 21:30

## 2020-07-15 RX ADMIN — CLOPIDOGREL BISULFATE 75 MILLIGRAM(S): 75 TABLET, FILM COATED ORAL at 11:43

## 2020-07-15 RX ADMIN — ATORVASTATIN CALCIUM 80 MILLIGRAM(S): 80 TABLET, FILM COATED ORAL at 21:30

## 2020-07-15 RX ADMIN — Medication 40 MILLIGRAM(S): at 18:31

## 2020-07-15 RX ADMIN — POLYETHYLENE GLYCOL 3350 17 GRAM(S): 17 POWDER, FOR SOLUTION ORAL at 18:30

## 2020-07-15 RX ADMIN — Medication 5 MILLIGRAM(S): at 18:31

## 2020-07-15 RX ADMIN — Medication 145 MILLIGRAM(S): at 11:44

## 2020-07-15 RX ADMIN — Medication 5 MILLIGRAM(S): at 05:28

## 2020-07-15 RX ADMIN — BUDESONIDE AND FORMOTEROL FUMARATE DIHYDRATE 2 PUFF(S): 160; 4.5 AEROSOL RESPIRATORY (INHALATION) at 21:31

## 2020-07-15 RX ADMIN — QUETIAPINE FUMARATE 100 MILLIGRAM(S): 200 TABLET, FILM COATED ORAL at 21:30

## 2020-07-15 RX ADMIN — BUDESONIDE AND FORMOTEROL FUMARATE DIHYDRATE 2 PUFF(S): 160; 4.5 AEROSOL RESPIRATORY (INHALATION) at 13:25

## 2020-07-15 RX ADMIN — Medication 50 MILLIGRAM(S): at 05:26

## 2020-07-15 NOTE — PROGRESS NOTE ADULT - SUBJECTIVE AND OBJECTIVE BOX
Hospital Day:  3d    Patient is a 55y old  Female who presents with a chief complaint of 3d    Subjective:  pt seen and examined at bedside.   on oxygen 3 LPM.  She reports improvement in her breathing and almost back to baseline.  Will shift to lasix po today and see if improvement.    Past Medical Hx:   CKD (chronic kidney disease), stage II  Type 2 diabetes mellitus  Cerebrovascular accident (CVA)  CHF (congestive heart failure)  COPD, severe  Anxiety and depression  Hyperlipidemia  Hypertension    Past Sx:  No significant past surgical history    Allergies:  No Known Allergies    Current Meds:   Standng Meds:  amLODIPine   Tablet 10 milliGRAM(s) Oral daily  aspirin  chewable 81 milliGRAM(s) Oral daily  atorvastatin 80 milliGRAM(s) Oral at bedtime  budesonide 160 MICROgram(s)/formoterol 4.5 MICROgram(s) Inhaler 2 Puff(s) Inhalation two times a day  busPIRone 30 milliGRAM(s) Oral two times a day  clopidogrel Tablet 75 milliGRAM(s) Oral daily  enoxaparin Injectable 40 milliGRAM(s) SubCutaneous daily  ergocalciferol 04656 Unit(s) Oral every week  fenofibrate Tablet 145 milliGRAM(s) Oral daily  furosemide    Tablet 40 milliGRAM(s) Oral daily  lisinopril 40 milliGRAM(s) Oral daily  metoprolol tartrate 50 milliGRAM(s) Oral two times a day  oxybutynin 5 milliGRAM(s) Oral two times a day  polyethylene glycol 3350 17 Gram(s) Oral every 12 hours  QUEtiapine 100 milliGRAM(s) Oral at bedtime  risperiDONE   Tablet 1 milliGRAM(s) Oral daily  risperiDONE   Tablet 3 milliGRAM(s) Oral at bedtime  senna 2 Tablet(s) Oral at bedtime    PRN Meds:  ALBUTerol   0.042% 1.25 milliGRAM(s) Nebulizer every 6 hours PRN Shortness of Breath and/or Wheezing  bisacodyl Suppository 10 milliGRAM(s) Rectal daily PRN Constipation    HOME MEDICATIONS:  aspirin 81 mg oral tablet: 1 tab(s) orally once a day  BuSpar 10 mg oral tablet: 3 tab(s) orally 2 times a day  doxepin 50 mg oral capsule: 1 cap(s) orally once a day  fenofibrate 145 mg oral tablet: 1 tab(s) orally once a day  glipiZIDE 10 mg oral tablet: 1 tab(s) orally 2 times a day  Lasix 40 mg oral tablet: 1 tab(s) orally 2 times a day  Lipitor 80 mg oral tablet: 1 tab(s) orally once a day  lisinopril 40 mg oral tablet: 1 tab(s) orally once a day  metFORMIN 1000 mg oral tablet: 1 tab(s) orally 2 times a day  metoprolol tartrate 50 mg oral tablet: 1 tab(s) orally 2 times a day  Norvasc 10 mg oral tablet: 1 tab(s) orally once a day  oxybutynin 5 mg oral tablet: 1 tab(s) orally 2 times a day  Plavix 75 mg oral tablet: 1 tab(s) orally once a day  RisperDAL 1 mg oral tablet: 1 tab(s) orally once a day in AM  RisperDAL 3 mg oral tablet: 1 tab(s) orally once a day (at bedtime)  SEROquel 100 mg oral tablet: 1 tab(s) orally once a day (at bedtime)  Vitamin D3 50,000 intl units (1250 mcg) oral capsule: 1 cap(s) orally once a week      Vital Signs:   T(F): 96.9 (07-15-20 @ 07:36), Max: 98.1 (07-14-20 @ 16:00)  HR: 74 (07-15-20 @ 07:36) (73 - 76)  BP: 149/92 (07-15-20 @ 07:36) (134/81 - 161/95)  RR: 18 (07-15-20 @ 07:36) (18 - 18)  SpO2: --      07-14-20 @ 07:01  -  07-15-20 @ 07:00  --------------------------------------------------------  IN: 504 mL / OUT: 800 mL / NET: -296 mL        Physical Exam:   GENERAL: NAD, on oxygen via NC, not in distress  HEENT: NCAT  CHEST/LUNG: CTAB, presence of crackles bibasilar.   HEART: Regular rate and rhythm; s1 s2 appreciated, No murmurs, rubs, or gallops  ABDOMEN: Soft, Nontender, Nondistended; Bowel sounds present  EXTREMITIES: No LE edema b/l  SKIN: no rashes, no new lesions  NERVOUS SYSTEM:  Alert & Oriented X3  LINES/CATHETERS: on 3 LPM o2 via NC        Labs:                         12.1   4.90  )-----------( 276      ( 15 Jul 2020 07:12 )             38.6     Neutophil% 59.5, Lymphocyte% 32.2, Monocyte% 5.9, Bands% 0.4 07-15-20 @ 07:12    15 Jul 2020 07:12    144    |  102    |  18     ----------------------------<  148    3.9     |  30     |  0.9      Ca    9.2        15 Jul 2020 07:12  Mg     1.7       15 Jul 2020 07:12    TPro  5.5    /  Alb  3.5    /  TBili  0.5    /  DBili  x      /  AST  13     /  ALT  15     /  AlkPhos  78     14 Jul 2020 04:49        Serum Pro-Brain Natriuretic Peptide: 4136 pg/mL (07-12-20 @ 11:47)    Troponin <0.01, CKMB --, CK -- 07-12-20 @ 11:47        Radiology:   N/A    Assessment and Plan:     55 year old female with a pmhx of HFrEF 38%, HTN, HLD, COPD on 2L Home O2 presents with SOB for 3 days duration.     # Exacerbation of HfrEF secondary to non-compliance of medications/fluid intake   - oxygen requirement at baseline 2L   - CXR w bilateral interstitial edema with mild bilateral effusions, CXR this morning showed congestion.   - daily weight   - strict Is&Os w fluid restriction  - On IV lasix, shifted today to oral , home dose to check on Respiratory status tomorrow.   - If improvement, consider DC tomorrow PM  - Will provide patient with incentive spirometer.   - c/w metoprolol and lisinopril     # HTN- controlled  - c/w Norvasc and lisinopril     # HLD  - c/w fenofibrate and statin     # COPD on 2L NC, stable with no wheeze on exam   - Duonebs PRN if needed  - not on any treatment at home  - Added Symbicort today , to continue at home.   -F/U pulmonary as Out patient.     # DM  - Holding oral hypoglycemic  - BS< 180. Monitor and start Lantus if persistently > 180    # History of CVA with left sided weakness  - continue aspirin + plavix + atorvastatin    # DVT PPX   # DASH Diet , with 1500ml fluid restriction  # Full code   # DISPO: Acute Hospital Day:  3d    Patient is a 55y old  Female who presents with a chief complaint of 3d    Subjective:  pt seen and examined at bedside.   on oxygen 3 LPM.  She reports improvement in her breathing and almost back to baseline.  Will shift to lasix po today and see if improvement.    Past Medical Hx:   CKD (chronic kidney disease), stage II  Type 2 diabetes mellitus  Cerebrovascular accident (CVA)  CHF (congestive heart failure)  COPD, severe  Anxiety and depression  Hyperlipidemia  Hypertension    Past Sx:  No significant past surgical history    Allergies:  No Known Allergies    Current Meds:   Standng Meds:  amLODIPine   Tablet 10 milliGRAM(s) Oral daily  aspirin  chewable 81 milliGRAM(s) Oral daily  atorvastatin 80 milliGRAM(s) Oral at bedtime  budesonide 160 MICROgram(s)/formoterol 4.5 MICROgram(s) Inhaler 2 Puff(s) Inhalation two times a day  busPIRone 30 milliGRAM(s) Oral two times a day  clopidogrel Tablet 75 milliGRAM(s) Oral daily  enoxaparin Injectable 40 milliGRAM(s) SubCutaneous daily  ergocalciferol 10273 Unit(s) Oral every week  fenofibrate Tablet 145 milliGRAM(s) Oral daily  furosemide    Tablet 40 milliGRAM(s) Oral daily  lisinopril 40 milliGRAM(s) Oral daily  metoprolol tartrate 50 milliGRAM(s) Oral two times a day  oxybutynin 5 milliGRAM(s) Oral two times a day  polyethylene glycol 3350 17 Gram(s) Oral every 12 hours  QUEtiapine 100 milliGRAM(s) Oral at bedtime  risperiDONE   Tablet 1 milliGRAM(s) Oral daily  risperiDONE   Tablet 3 milliGRAM(s) Oral at bedtime  senna 2 Tablet(s) Oral at bedtime    PRN Meds:  ALBUTerol   0.042% 1.25 milliGRAM(s) Nebulizer every 6 hours PRN Shortness of Breath and/or Wheezing  bisacodyl Suppository 10 milliGRAM(s) Rectal daily PRN Constipation    HOME MEDICATIONS:  aspirin 81 mg oral tablet: 1 tab(s) orally once a day  BuSpar 10 mg oral tablet: 3 tab(s) orally 2 times a day  doxepin 50 mg oral capsule: 1 cap(s) orally once a day  fenofibrate 145 mg oral tablet: 1 tab(s) orally once a day  glipiZIDE 10 mg oral tablet: 1 tab(s) orally 2 times a day  Lasix 40 mg oral tablet: 1 tab(s) orally 2 times a day  Lipitor 80 mg oral tablet: 1 tab(s) orally once a day  lisinopril 40 mg oral tablet: 1 tab(s) orally once a day  metFORMIN 1000 mg oral tablet: 1 tab(s) orally 2 times a day  metoprolol tartrate 50 mg oral tablet: 1 tab(s) orally 2 times a day  Norvasc 10 mg oral tablet: 1 tab(s) orally once a day  oxybutynin 5 mg oral tablet: 1 tab(s) orally 2 times a day  Plavix 75 mg oral tablet: 1 tab(s) orally once a day  RisperDAL 1 mg oral tablet: 1 tab(s) orally once a day in AM  RisperDAL 3 mg oral tablet: 1 tab(s) orally once a day (at bedtime)  SEROquel 100 mg oral tablet: 1 tab(s) orally once a day (at bedtime)  Vitamin D3 50,000 intl units (1250 mcg) oral capsule: 1 cap(s) orally once a week      Vital Signs:   T(F): 96.9 (07-15-20 @ 07:36), Max: 98.1 (07-14-20 @ 16:00)  HR: 74 (07-15-20 @ 07:36) (73 - 76)  BP: 149/92 (07-15-20 @ 07:36) (134/81 - 161/95)  RR: 18 (07-15-20 @ 07:36) (18 - 18)  SpO2: --      07-14-20 @ 07:01  -  07-15-20 @ 07:00  --------------------------------------------------------  IN: 504 mL / OUT: 800 mL / NET: -296 mL        Physical Exam:   GENERAL: NAD, on oxygen via NC, not in distress  HEENT: NCAT  CHEST/LUNG: CTAB, presence of crackles bibasilar.   HEART: Regular rate and rhythm; s1 s2 appreciated, No murmurs, rubs, or gallops  ABDOMEN: Soft, Nontender, Nondistended; Bowel sounds present  EXTREMITIES: No LE edema b/l  SKIN: no rashes, no new lesions  NERVOUS SYSTEM:  Alert & Oriented X3  LINES/CATHETERS: on 3 LPM o2 via NC        Labs:                         12.1   4.90  )-----------( 276      ( 15 Jul 2020 07:12 )             38.6     Neutophil% 59.5, Lymphocyte% 32.2, Monocyte% 5.9, Bands% 0.4 07-15-20 @ 07:12    15 Jul 2020 07:12    144    |  102    |  18     ----------------------------<  148    3.9     |  30     |  0.9      Ca    9.2        15 Jul 2020 07:12  Mg     1.7       15 Jul 2020 07:12    TPro  5.5    /  Alb  3.5    /  TBili  0.5    /  DBili  x      /  AST  13     /  ALT  15     /  AlkPhos  78     14 Jul 2020 04:49        Serum Pro-Brain Natriuretic Peptide: 4136 pg/mL (07-12-20 @ 11:47)    Troponin <0.01, CKMB --, CK -- 07-12-20 @ 11:47        Radiology:   N/A    Assessment and Plan:     55 year old female with a pmhx of HFrEF 38%, HTN, HLD, COPD on 2L Home O2 presents with SOB for 3 days duration.     # Acute on chronic systolic CHF exacerbation 2/2 diet noncompliance   - oxygen requirement at baseline 2L   - CXR w bilateral interstitial edema with mild bilateral effusions, CXR this morning showed congestion.   - daily weight   - strict Is&Os w fluid restriction  - On IV lasix, shifted today to oral , home dose to check on Respiratory status tomorrow.   - If improvement, consider DC tomorrow PM  - Will provide patient with incentive spirometer.   - c/w metoprolol and lisinopril     # HTN- controlled  - c/w Norvasc and lisinopril     # HLD  - c/w fenofibrate and statin     # COPD on 2L NC, stable with no wheeze on exam   - Duonebs PRN if needed  - not on any treatment at home  - Added Symbicort today , to continue at home.   -F/U pulmonary as Out patient.     # DM  - Holding oral hypoglycemic  - BS< 180. Monitor and start Lantus if persistently > 180    # History of CVA with left sided weakness  - continue aspirin + plavix + atorvastatin    # DVT PPX   # DASH Diet , with 1500ml fluid restriction  # Full code   # DISPO: Acute

## 2020-07-15 NOTE — PROGRESS NOTE ADULT - ATTENDING COMMENTS
Patient seen and examined. Feeling better. Will transition to PO lasix tomorrow. Anticipated for dc to home in AM.     #Hypomagnesemia - replete   #Suspected vitamin D deficiency - c/w supplementation

## 2020-07-16 ENCOUNTER — TRANSCRIPTION ENCOUNTER (OUTPATIENT)
Age: 55
End: 2020-07-16

## 2020-07-16 VITALS
SYSTOLIC BLOOD PRESSURE: 154 MMHG | TEMPERATURE: 98 F | HEART RATE: 72 BPM | DIASTOLIC BLOOD PRESSURE: 85 MMHG | RESPIRATION RATE: 18 BRPM

## 2020-07-16 LAB
ANION GAP SERPL CALC-SCNC: 12 MMOL/L — SIGNIFICANT CHANGE UP (ref 7–14)
BASOPHILS # BLD AUTO: 0.06 K/UL — SIGNIFICANT CHANGE UP (ref 0–0.2)
BASOPHILS NFR BLD AUTO: 1.1 % — HIGH (ref 0–1)
BUN SERPL-MCNC: 15 MG/DL — SIGNIFICANT CHANGE UP (ref 10–20)
CALCIUM SERPL-MCNC: 9.5 MG/DL — SIGNIFICANT CHANGE UP (ref 8.5–10.1)
CHLORIDE SERPL-SCNC: 101 MMOL/L — SIGNIFICANT CHANGE UP (ref 98–110)
CO2 SERPL-SCNC: 31 MMOL/L — SIGNIFICANT CHANGE UP (ref 17–32)
CREAT SERPL-MCNC: 0.8 MG/DL — SIGNIFICANT CHANGE UP (ref 0.7–1.5)
EOSINOPHIL # BLD AUTO: 0.08 K/UL — SIGNIFICANT CHANGE UP (ref 0–0.7)
EOSINOPHIL NFR BLD AUTO: 1.4 % — SIGNIFICANT CHANGE UP (ref 0–8)
GLUCOSE BLDC GLUCOMTR-MCNC: 135 MG/DL — HIGH (ref 70–99)
GLUCOSE BLDC GLUCOMTR-MCNC: 383 MG/DL — HIGH (ref 70–99)
GLUCOSE SERPL-MCNC: 157 MG/DL — HIGH (ref 70–99)
HCT VFR BLD CALC: 40.4 % — SIGNIFICANT CHANGE UP (ref 37–47)
HGB BLD-MCNC: 12.7 G/DL — SIGNIFICANT CHANGE UP (ref 12–16)
IMM GRANULOCYTES NFR BLD AUTO: 0.5 % — HIGH (ref 0.1–0.3)
LYMPHOCYTES # BLD AUTO: 1.73 K/UL — SIGNIFICANT CHANGE UP (ref 1.2–3.4)
LYMPHOCYTES # BLD AUTO: 30.3 % — SIGNIFICANT CHANGE UP (ref 20.5–51.1)
MAGNESIUM SERPL-MCNC: 1.7 MG/DL — LOW (ref 1.8–2.4)
MCHC RBC-ENTMCNC: 28.7 PG — SIGNIFICANT CHANGE UP (ref 27–31)
MCHC RBC-ENTMCNC: 31.4 G/DL — LOW (ref 32–37)
MCV RBC AUTO: 91.2 FL — SIGNIFICANT CHANGE UP (ref 81–99)
MONOCYTES # BLD AUTO: 0.36 K/UL — SIGNIFICANT CHANGE UP (ref 0.1–0.6)
MONOCYTES NFR BLD AUTO: 6.3 % — SIGNIFICANT CHANGE UP (ref 1.7–9.3)
NEUTROPHILS # BLD AUTO: 3.45 K/UL — SIGNIFICANT CHANGE UP (ref 1.4–6.5)
NEUTROPHILS NFR BLD AUTO: 60.4 % — SIGNIFICANT CHANGE UP (ref 42.2–75.2)
NRBC # BLD: 0 /100 WBCS — SIGNIFICANT CHANGE UP (ref 0–0)
PLATELET # BLD AUTO: 272 K/UL — SIGNIFICANT CHANGE UP (ref 130–400)
POTASSIUM SERPL-MCNC: 3.9 MMOL/L — SIGNIFICANT CHANGE UP (ref 3.5–5)
POTASSIUM SERPL-SCNC: 3.9 MMOL/L — SIGNIFICANT CHANGE UP (ref 3.5–5)
RBC # BLD: 4.43 M/UL — SIGNIFICANT CHANGE UP (ref 4.2–5.4)
RBC # FLD: 17.6 % — HIGH (ref 11.5–14.5)
SODIUM SERPL-SCNC: 144 MMOL/L — SIGNIFICANT CHANGE UP (ref 135–146)
WBC # BLD: 5.71 K/UL — SIGNIFICANT CHANGE UP (ref 4.8–10.8)
WBC # FLD AUTO: 5.71 K/UL — SIGNIFICANT CHANGE UP (ref 4.8–10.8)

## 2020-07-16 PROCEDURE — 99239 HOSP IP/OBS DSCHRG MGMT >30: CPT

## 2020-07-16 RX ORDER — MAGNESIUM SULFATE 500 MG/ML
2 VIAL (ML) INJECTION ONCE
Refills: 0 | Status: COMPLETED | OUTPATIENT
Start: 2020-07-16 | End: 2020-07-16

## 2020-07-16 RX ORDER — FUROSEMIDE 40 MG
1 TABLET ORAL
Qty: 30 | Refills: 0
Start: 2020-07-16 | End: 2020-08-14

## 2020-07-16 RX ORDER — FUROSEMIDE 40 MG
1 TABLET ORAL
Qty: 60 | Refills: 0
Start: 2020-07-16 | End: 2020-08-14

## 2020-07-16 RX ORDER — BUDESONIDE AND FORMOTEROL FUMARATE DIHYDRATE 160; 4.5 UG/1; UG/1
2 AEROSOL RESPIRATORY (INHALATION)
Qty: 3 | Refills: 0
Start: 2020-07-16 | End: 2020-08-14

## 2020-07-16 RX ORDER — FUROSEMIDE 40 MG
1 TABLET ORAL
Qty: 0 | Refills: 0 | DISCHARGE

## 2020-07-16 RX ORDER — METOPROLOL TARTRATE 50 MG
1 TABLET ORAL
Qty: 0 | Refills: 0 | DISCHARGE

## 2020-07-16 RX ORDER — METOPROLOL TARTRATE 50 MG
1 TABLET ORAL
Qty: 30 | Refills: 0
Start: 2020-07-16 | End: 2020-08-14

## 2020-07-16 RX ADMIN — Medication 30 MILLIGRAM(S): at 05:49

## 2020-07-16 RX ADMIN — CLOPIDOGREL BISULFATE 75 MILLIGRAM(S): 75 TABLET, FILM COATED ORAL at 11:22

## 2020-07-16 RX ADMIN — Medication 50 MILLIGRAM(S): at 05:49

## 2020-07-16 RX ADMIN — LISINOPRIL 40 MILLIGRAM(S): 2.5 TABLET ORAL at 05:49

## 2020-07-16 RX ADMIN — AMLODIPINE BESYLATE 10 MILLIGRAM(S): 2.5 TABLET ORAL at 05:49

## 2020-07-16 RX ADMIN — Medication 145 MILLIGRAM(S): at 11:23

## 2020-07-16 RX ADMIN — Medication 40 MILLIGRAM(S): at 05:50

## 2020-07-16 RX ADMIN — ENOXAPARIN SODIUM 40 MILLIGRAM(S): 100 INJECTION SUBCUTANEOUS at 11:22

## 2020-07-16 RX ADMIN — Medication 81 MILLIGRAM(S): at 11:22

## 2020-07-16 RX ADMIN — RISPERIDONE 1 MILLIGRAM(S): 4 TABLET ORAL at 11:22

## 2020-07-16 RX ADMIN — Medication 50 GRAM(S): at 09:01

## 2020-07-16 NOTE — DISCHARGE NOTE PROVIDER - NSDCMRMEDTOKEN_GEN_ALL_CORE_FT
aspirin 81 mg oral tablet: 1 tab(s) orally once a day  bisacodyl 10 mg rectal suppository: 1 suppository(ies) rectal once a day, As needed, Constipation  budesonide-formoterol 160 mcg-4.5 mcg/inh inhalation aerosol: 2 puff(s) inhaled 2 times a day   BuSpar 10 mg oral tablet: 3 tab(s) orally 2 times a day  doxepin 50 mg oral capsule: 1 cap(s) orally once a day  fenofibrate 145 mg oral tablet: 1 tab(s) orally once a day  glipiZIDE 10 mg oral tablet: 1 tab(s) orally 2 times a day  Kapspargo Sprinkle 200 mg oral capsule, extended release: 1 tab(s) orally once a day   Lasix 40 mg oral tablet: 1 tab(s) orally 2 times a day  Lipitor 80 mg oral tablet: 1 tab(s) orally once a day  lisinopril 40 mg oral tablet: 1 tab(s) orally once a day  metFORMIN 1000 mg oral tablet: 1 tab(s) orally 2 times a day  Norvasc 10 mg oral tablet: 1 tab(s) orally once a day  oxybutynin 5 mg oral tablet: 1 tab(s) orally 2 times a day  Plavix 75 mg oral tablet: 1 tab(s) orally once a day  polyethylene glycol 3350 oral powder for reconstitution: 17 gram(s) orally once a day, As Needed -for constipation   RisperDAL 1 mg oral tablet: 1 tab(s) orally once a day in AM  RisperDAL 3 mg oral tablet: 1 tab(s) orally once a day (at bedtime)  senna oral tablet: 2 tab(s) orally once a day (at bedtime), As Needed -for constipation   SEROquel 100 mg oral tablet: 1 tab(s) orally once a day (at bedtime)  Vitamin D3 50,000 intl units (1250 mcg) oral capsule: 1 cap(s) orally once a week

## 2020-07-16 NOTE — DISCHARGE NOTE PROVIDER - CARE PROVIDER_API CALL
Aixa Charles  INTERNAL MEDICINE  [address] [place] [address]  Phone: [phone]  Fax: [phone]  Follow Up Time: 1 week

## 2020-07-16 NOTE — CDI QUERY NOTE - NSCDINOTEPOA_GEN_ALL_CORE_FT
Was the condition present on admission? If so, please document in the chart that “(the condition) was present on admission.”
37

## 2020-07-16 NOTE — DISCHARGE NOTE NURSING/CASE MANAGEMENT/SOCIAL WORK - PATIENT PORTAL LINK FT
You can access the FollowMyHealth Patient Portal offered by [facility] by registering at the following website: [email or web address]. By joining [facility]’s FollowMyHealth portal, you will also be able to view your health information using other applications (apps) compatible with our system.

## 2020-07-16 NOTE — CDI QUERY NOTE - NSCDIOTHERTXTBX_GEN_ALL_CORE_HH
Dr. Barbosa,  Documentation in the H+P: 55 year old female with a pmhx of HFrEF 38%, HTN, HLD, and COPD on 2L Home O2 presents with SOB for 3 days duration. Admitted for exacerbation CHF  Documentation in PN 7/15: # COPD on home O2 2L NC,   - Duonebs PRN if needed  - Not on any treatment at home  - Added Symbicort today, to continue at home.   -F/U pulmonary as Outpatient.     QUERY  Please further clarify a diagnosis related to Home O2 use in the progress notes such as:  •	Chronic Respiratory Failure   •	Hypoxia, hypercapnia and/or hyperbicarbic   •	Other condition , please comment  •	Unable to clinically determine    Thank You,  LYNDA Macdonald, RN, CCDS  Clinical   [facility]   Consultant [facility]

## 2020-07-16 NOTE — DISCHARGE NOTE PROVIDER - NSDCCPCAREPLAN_GEN_ALL_CORE_FT
PRINCIPAL DISCHARGE DIAGNOSIS  Diagnosis: CHF exacerbation  Assessment and Plan of Treatment: You presented with Heart failure exacerbation.   please make sure to take Lasix 40 mg teice a day and Metoprolol 200 mg once a day.   Please avoid eating salt and extra fluids as this can put you at risk of future decompensation.   Please follow up with your primary doctor for refills and don't let yourself run out of medications.   Congestive heart failure (CHF) is a chronic condition in which the heart has trouble pumping blood. In some cases of heart failure, fluid may back up into your lungs or you may have swelling (edema) in your lower legs. There are many causes of heart failure including high blood pressure, coronary artery disease, abnormal heart valves, heart muscle disease, lung disease, diabetes, etc. Symptoms include shortness of breath with activity or when lying flat, cough, swelling of the legs, fatigue, or increased urination during the night.   Treatment is aimed at managing the symptoms of heart failure and may include lifestyle changes, medications, or surgical procedures. Take medicines only as directed by your health care provider and do not stop unless instructed to do so. Eat heart-healthy foods with low or no trans/saturated fats, cholesterol and salt. Weigh yourself every day for early recognition of fluid accumulation.  SEEK IMMEDIATE MEDICAL CARE IF YOU HAVE ANY OF THE FOLLOWING SYMPTOMS: shortness of breath, change in mental status, chest pain, lightheadedness/dizziness/fainting, or worsening of symptoms including not being able to conduct normal physical activity.        SECONDARY DISCHARGE DIAGNOSES  Diagnosis: COPD with hypoxia  Assessment and Plan of Treatment: Please keep using your home oxygen and follow up with your doctor for

## 2020-07-16 NOTE — DISCHARGE NOTE PROVIDER - HOSPITAL COURSE
55 year old female with a pmhx of HFrEF 38%, HTN, HLD, COPD on 2L Home O2 due to chronic respiratory failure presents with SOB for 3 days duration.     patient was doing well until 3 days prior to presentation when started SOB, initially with exertion and progressively worsened to be at rest, reports Orthopnea, PND and mild legs swelling. no cough, fever, chest pain, chills or myalgia.     She has not been taking her lasix for a week as she ran out of all her medications.    Her Chest xray showed pulmonary congestion, and she was diagnosed with CHF decompensation. During her hospital stay, she received IV diuresis with improvement of her clinical picture and SOB. She was discharged home on PO Lasix.

## 2020-07-16 NOTE — CHART NOTE - NSCHARTNOTEFT_GEN_A_CORE
<<<RESIDENT DISCHARGE NOTE>>>     WILLIAN MARY  MRN-[id number]    VITAL SIGNS:  T(F): 97.6 (07-16-20 @ 07:54), Max: 98.4 (07-15-20 @ 16:00)  HR: 72 (07-16-20 @ 07:54)  BP: 154/85 (07-16-20 @ 07:54)  SpO2: --      PHYSICAL EXAMINATION:  General: Laying in bed, on home oxygen  Head & Neck: HEENT  Pulmonary: LUNGS presence of bibasilar crackles, no wheezing  Cardiovascular: regular S1 s2  Gastrointestinal/Abdomen & Pelvis: soft non tender  Neurologic/Motor: 2/5 in RLE, LLE, RUE AND LUE    TEST RESULTS:                        12.7   5.71  )-----------( 272      ( 16 Jul 2020 05:41 )             40.4       07-16    144  |  101  |  15  ----------------------------<  157<H>  3.9   |  31  |  0.8    Ca    9.5      16 Jul 2020 05:41  Mg     1.7     07-16        FINAL DISCHARGE INTERVIEW:  Resident(s) Present: (Name:____Socorro Malave_________), RN Present: (Name:  ___________)    DISCHARGE MEDICATION RECONCILIATION  reviewed with Attending (Name:____Felipe Barbosa_______)    DISPOSITION:   [  ] Home,    [  X] Home with Visiting Nursing Services,   [    ]  SNF/ NH,    [   ] Acute Rehab (4A),   [   ] Other (Specify:_________)

## 2020-07-20 DIAGNOSIS — E11.22 TYPE 2 DIABETES MELLITUS WITH DIABETIC CHRONIC KIDNEY DISEASE: ICD-10-CM

## 2020-07-20 DIAGNOSIS — Z79.02 LONG TERM (CURRENT) USE OF ANTITHROMBOTICS/ANTIPLATELETS: ICD-10-CM

## 2020-07-20 DIAGNOSIS — K59.00 CONSTIPATION, UNSPECIFIED: ICD-10-CM

## 2020-07-20 DIAGNOSIS — E78.5 HYPERLIPIDEMIA, UNSPECIFIED: ICD-10-CM

## 2020-07-20 DIAGNOSIS — Z79.82 LONG TERM (CURRENT) USE OF ASPIRIN: ICD-10-CM

## 2020-07-20 DIAGNOSIS — I50.23 ACUTE ON CHRONIC SYSTOLIC (CONGESTIVE) HEART FAILURE: ICD-10-CM

## 2020-07-20 DIAGNOSIS — F32.9 MAJOR DEPRESSIVE DISORDER, SINGLE EPISODE, UNSPECIFIED: ICD-10-CM

## 2020-07-20 DIAGNOSIS — Z91.14 PATIENT'S OTHER NONCOMPLIANCE WITH MEDICATION REGIMEN: ICD-10-CM

## 2020-07-20 DIAGNOSIS — I13.0 HYPERTENSIVE HEART AND CHRONIC KIDNEY DISEASE WITH HEART FAILURE AND STAGE 1 THROUGH STAGE 4 CHRONIC KIDNEY DISEASE, OR UNSPECIFIED CHRONIC KIDNEY DISEASE: ICD-10-CM

## 2020-07-20 DIAGNOSIS — Z99.81 DEPENDENCE ON SUPPLEMENTAL OXYGEN: ICD-10-CM

## 2020-07-20 DIAGNOSIS — E83.42 HYPOMAGNESEMIA: ICD-10-CM

## 2020-07-20 DIAGNOSIS — Z79.84 LONG TERM (CURRENT) USE OF ORAL HYPOGLYCEMIC DRUGS: ICD-10-CM

## 2020-07-20 DIAGNOSIS — F41.9 ANXIETY DISORDER, UNSPECIFIED: ICD-10-CM

## 2020-07-20 DIAGNOSIS — I69.354 HEMIPLEGIA AND HEMIPARESIS FOLLOWING CEREBRAL INFARCTION AFFECTING LEFT NON-DOMINANT SIDE: ICD-10-CM

## 2020-07-20 DIAGNOSIS — N18.2 CHRONIC KIDNEY DISEASE, STAGE 2 (MILD): ICD-10-CM

## 2020-07-20 DIAGNOSIS — J44.9 CHRONIC OBSTRUCTIVE PULMONARY DISEASE, UNSPECIFIED: ICD-10-CM

## 2020-07-20 DIAGNOSIS — J96.11 CHRONIC RESPIRATORY FAILURE WITH HYPOXIA: ICD-10-CM

## 2020-08-11 ENCOUNTER — INPATIENT (INPATIENT)
Facility: HOSPITAL | Age: 55
LOS: 1 days | Discharge: HOME | End: 2020-08-13
Attending: INTERNAL MEDICINE | Admitting: INTERNAL MEDICINE
Payer: MEDICARE

## 2020-08-11 VITALS
SYSTOLIC BLOOD PRESSURE: 239 MMHG | RESPIRATION RATE: 30 BRPM | HEART RATE: 137 BPM | DIASTOLIC BLOOD PRESSURE: 139 MMHG | OXYGEN SATURATION: 86 % | WEIGHT: 164.91 LBS

## 2020-08-11 LAB
ALBUMIN SERPL ELPH-MCNC: 4.3 G/DL — SIGNIFICANT CHANGE UP (ref 3.5–5.2)
ALP SERPL-CCNC: 65 U/L — SIGNIFICANT CHANGE UP (ref 30–115)
ALT FLD-CCNC: 11 U/L — SIGNIFICANT CHANGE UP (ref 0–41)
ANION GAP SERPL CALC-SCNC: 15 MMOL/L — HIGH (ref 7–14)
AST SERPL-CCNC: 21 U/L — SIGNIFICANT CHANGE UP (ref 0–41)
BASE EXCESS BLDV CALC-SCNC: 5 MMOL/L — HIGH (ref -2–2)
BASOPHILS # BLD AUTO: 0.18 K/UL — SIGNIFICANT CHANGE UP (ref 0–0.2)
BASOPHILS NFR BLD AUTO: 2 % — HIGH (ref 0–1)
BILIRUB SERPL-MCNC: 0.8 MG/DL — SIGNIFICANT CHANGE UP (ref 0.2–1.2)
BUN SERPL-MCNC: 10 MG/DL — SIGNIFICANT CHANGE UP (ref 10–20)
CA-I SERPL-SCNC: 1.1 MMOL/L — LOW (ref 1.12–1.3)
CALCIUM SERPL-MCNC: 9.6 MG/DL — SIGNIFICANT CHANGE UP (ref 8.5–10.1)
CHLORIDE SERPL-SCNC: 107 MMOL/L — SIGNIFICANT CHANGE UP (ref 98–110)
CO2 SERPL-SCNC: 23 MMOL/L — SIGNIFICANT CHANGE UP (ref 17–32)
CREAT SERPL-MCNC: 1.1 MG/DL — SIGNIFICANT CHANGE UP (ref 0.7–1.5)
EOSINOPHIL # BLD AUTO: 0.25 K/UL — SIGNIFICANT CHANGE UP (ref 0–0.7)
EOSINOPHIL NFR BLD AUTO: 2.8 % — SIGNIFICANT CHANGE UP (ref 0–8)
GAS PNL BLDV: 137 MMOL/L — SIGNIFICANT CHANGE UP (ref 136–145)
GAS PNL BLDV: SIGNIFICANT CHANGE UP
GLUCOSE BLDC GLUCOMTR-MCNC: 108 MG/DL — HIGH (ref 70–99)
GLUCOSE BLDC GLUCOMTR-MCNC: 115 MG/DL — HIGH (ref 70–99)
GLUCOSE BLDC GLUCOMTR-MCNC: 119 MG/DL — HIGH (ref 70–99)
GLUCOSE SERPL-MCNC: 298 MG/DL — HIGH (ref 70–99)
HCO3 BLDV-SCNC: 31 MMOL/L — HIGH (ref 22–29)
HCT VFR BLD CALC: 46.4 % — SIGNIFICANT CHANGE UP (ref 37–47)
HCT VFR BLDA CALC: 48 % — HIGH (ref 34–44)
HGB BLD CALC-MCNC: 15.7 G/DL — SIGNIFICANT CHANGE UP (ref 14–18)
HGB BLD-MCNC: 13.8 G/DL — SIGNIFICANT CHANGE UP (ref 12–16)
IMM GRANULOCYTES NFR BLD AUTO: 0.7 % — HIGH (ref 0.1–0.3)
LACTATE BLDV-MCNC: 1.2 MMOL/L — SIGNIFICANT CHANGE UP (ref 0.5–1.6)
LYMPHOCYTES # BLD AUTO: 2.76 K/UL — SIGNIFICANT CHANGE UP (ref 1.2–3.4)
LYMPHOCYTES # BLD AUTO: 30.9 % — SIGNIFICANT CHANGE UP (ref 20.5–51.1)
MAGNESIUM SERPL-MCNC: 2 MG/DL — SIGNIFICANT CHANGE UP (ref 1.8–2.4)
MCHC RBC-ENTMCNC: 27.8 PG — SIGNIFICANT CHANGE UP (ref 27–31)
MCHC RBC-ENTMCNC: 29.7 G/DL — LOW (ref 32–37)
MCV RBC AUTO: 93.5 FL — SIGNIFICANT CHANGE UP (ref 81–99)
MONOCYTES # BLD AUTO: 0.36 K/UL — SIGNIFICANT CHANGE UP (ref 0.1–0.6)
MONOCYTES NFR BLD AUTO: 4 % — SIGNIFICANT CHANGE UP (ref 1.7–9.3)
NEUTROPHILS # BLD AUTO: 5.31 K/UL — SIGNIFICANT CHANGE UP (ref 1.4–6.5)
NEUTROPHILS NFR BLD AUTO: 59.6 % — SIGNIFICANT CHANGE UP (ref 42.2–75.2)
NRBC # BLD: 0 /100 WBCS — SIGNIFICANT CHANGE UP (ref 0–0)
NT-PROBNP SERPL-SCNC: 2867 PG/ML — HIGH (ref 0–300)
PCO2 BLDV: 48 MMHG — SIGNIFICANT CHANGE UP (ref 41–51)
PH BLDV: 7.42 — SIGNIFICANT CHANGE UP (ref 7.26–7.43)
PLATELET # BLD AUTO: 362 K/UL — SIGNIFICANT CHANGE UP (ref 130–400)
PO2 BLDV: 46 MMHG — HIGH (ref 20–40)
POTASSIUM BLDV-SCNC: 3.6 MMOL/L — SIGNIFICANT CHANGE UP (ref 3.3–5.6)
POTASSIUM SERPL-MCNC: 4.3 MMOL/L — SIGNIFICANT CHANGE UP (ref 3.5–5)
POTASSIUM SERPL-SCNC: 4.3 MMOL/L — SIGNIFICANT CHANGE UP (ref 3.5–5)
PROT SERPL-MCNC: 7.3 G/DL — SIGNIFICANT CHANGE UP (ref 6–8)
RBC # BLD: 4.96 M/UL — SIGNIFICANT CHANGE UP (ref 4.2–5.4)
RBC # FLD: 17.7 % — HIGH (ref 11.5–14.5)
SAO2 % BLDV: 82 % — SIGNIFICANT CHANGE UP
SODIUM SERPL-SCNC: 145 MMOL/L — SIGNIFICANT CHANGE UP (ref 135–146)
TROPONIN T SERPL-MCNC: 0.02 NG/ML — HIGH
TROPONIN T SERPL-MCNC: <0.01 NG/ML — SIGNIFICANT CHANGE UP
WBC # BLD: 8.92 K/UL — SIGNIFICANT CHANGE UP (ref 4.8–10.8)
WBC # FLD AUTO: 8.92 K/UL — SIGNIFICANT CHANGE UP (ref 4.8–10.8)

## 2020-08-11 PROCEDURE — 71045 X-RAY EXAM CHEST 1 VIEW: CPT | Mod: 26

## 2020-08-11 PROCEDURE — 99053 MED SERV 10PM-8AM 24 HR FAC: CPT

## 2020-08-11 PROCEDURE — 99223 1ST HOSP IP/OBS HIGH 75: CPT

## 2020-08-11 PROCEDURE — 93010 ELECTROCARDIOGRAM REPORT: CPT | Mod: 76

## 2020-08-11 PROCEDURE — 99291 CRITICAL CARE FIRST HOUR: CPT

## 2020-08-11 RX ORDER — BUDESONIDE AND FORMOTEROL FUMARATE DIHYDRATE 160; 4.5 UG/1; UG/1
2 AEROSOL RESPIRATORY (INHALATION)
Refills: 0 | Status: DISCONTINUED | OUTPATIENT
Start: 2020-08-11 | End: 2020-08-13

## 2020-08-11 RX ORDER — FUROSEMIDE 40 MG
40 TABLET ORAL
Refills: 0 | Status: DISCONTINUED | OUTPATIENT
Start: 2020-08-11 | End: 2020-08-12

## 2020-08-11 RX ORDER — DEXTROSE 50 % IN WATER 50 %
15 SYRINGE (ML) INTRAVENOUS ONCE
Refills: 0 | Status: DISCONTINUED | OUTPATIENT
Start: 2020-08-11 | End: 2020-08-13

## 2020-08-11 RX ORDER — IPRATROPIUM/ALBUTEROL SULFATE 18-103MCG
3 AEROSOL WITH ADAPTER (GRAM) INHALATION EVERY 6 HOURS
Refills: 0 | Status: DISCONTINUED | OUTPATIENT
Start: 2020-08-11 | End: 2020-08-13

## 2020-08-11 RX ORDER — INSULIN LISPRO 100/ML
5 VIAL (ML) SUBCUTANEOUS
Refills: 0 | Status: DISCONTINUED | OUTPATIENT
Start: 2020-08-11 | End: 2020-08-13

## 2020-08-11 RX ORDER — NITROGLYCERIN 6.5 MG
0.4 CAPSULE, EXTENDED RELEASE ORAL
Refills: 0 | Status: DISCONTINUED | OUTPATIENT
Start: 2020-08-11 | End: 2020-08-13

## 2020-08-11 RX ORDER — ATORVASTATIN CALCIUM 80 MG/1
80 TABLET, FILM COATED ORAL AT BEDTIME
Refills: 0 | Status: DISCONTINUED | OUTPATIENT
Start: 2020-08-11 | End: 2020-08-13

## 2020-08-11 RX ORDER — RISPERIDONE 4 MG/1
3 TABLET ORAL AT BEDTIME
Refills: 0 | Status: DISCONTINUED | OUTPATIENT
Start: 2020-08-11 | End: 2020-08-13

## 2020-08-11 RX ORDER — CLOPIDOGREL BISULFATE 75 MG/1
75 TABLET, FILM COATED ORAL DAILY
Refills: 0 | Status: DISCONTINUED | OUTPATIENT
Start: 2020-08-11 | End: 2020-08-13

## 2020-08-11 RX ORDER — SENNA PLUS 8.6 MG/1
2 TABLET ORAL AT BEDTIME
Refills: 0 | Status: DISCONTINUED | OUTPATIENT
Start: 2020-08-11 | End: 2020-08-13

## 2020-08-11 RX ORDER — RISPERIDONE 4 MG/1
1 TABLET ORAL DAILY
Refills: 0 | Status: DISCONTINUED | OUTPATIENT
Start: 2020-08-11 | End: 2020-08-13

## 2020-08-11 RX ORDER — NITROGLYCERIN 6.5 MG
100 CAPSULE, EXTENDED RELEASE ORAL
Qty: 50 | Refills: 0 | Status: DISCONTINUED | OUTPATIENT
Start: 2020-08-11 | End: 2020-08-11

## 2020-08-11 RX ORDER — DEXTROSE 50 % IN WATER 50 %
12.5 SYRINGE (ML) INTRAVENOUS ONCE
Refills: 0 | Status: DISCONTINUED | OUTPATIENT
Start: 2020-08-11 | End: 2020-08-13

## 2020-08-11 RX ORDER — ENOXAPARIN SODIUM 100 MG/ML
40 INJECTION SUBCUTANEOUS DAILY
Refills: 0 | Status: DISCONTINUED | OUTPATIENT
Start: 2020-08-11 | End: 2020-08-13

## 2020-08-11 RX ORDER — DEXTROSE 50 % IN WATER 50 %
25 SYRINGE (ML) INTRAVENOUS ONCE
Refills: 0 | Status: DISCONTINUED | OUTPATIENT
Start: 2020-08-11 | End: 2020-08-13

## 2020-08-11 RX ORDER — POLYETHYLENE GLYCOL 3350 17 G/17G
17 POWDER, FOR SOLUTION ORAL DAILY
Refills: 0 | Status: DISCONTINUED | OUTPATIENT
Start: 2020-08-11 | End: 2020-08-13

## 2020-08-11 RX ORDER — FENOFIBRATE,MICRONIZED 130 MG
145 CAPSULE ORAL DAILY
Refills: 0 | Status: DISCONTINUED | OUTPATIENT
Start: 2020-08-11 | End: 2020-08-13

## 2020-08-11 RX ORDER — ASPIRIN/CALCIUM CARB/MAGNESIUM 324 MG
81 TABLET ORAL DAILY
Refills: 0 | Status: DISCONTINUED | OUTPATIENT
Start: 2020-08-11 | End: 2020-08-13

## 2020-08-11 RX ORDER — SODIUM CHLORIDE 9 MG/ML
1000 INJECTION, SOLUTION INTRAVENOUS
Refills: 0 | Status: DISCONTINUED | OUTPATIENT
Start: 2020-08-11 | End: 2020-08-13

## 2020-08-11 RX ORDER — QUETIAPINE FUMARATE 200 MG/1
100 TABLET, FILM COATED ORAL AT BEDTIME
Refills: 0 | Status: DISCONTINUED | OUTPATIENT
Start: 2020-08-11 | End: 2020-08-13

## 2020-08-11 RX ORDER — INSULIN LISPRO 100/ML
VIAL (ML) SUBCUTANEOUS
Refills: 0 | Status: DISCONTINUED | OUTPATIENT
Start: 2020-08-11 | End: 2020-08-13

## 2020-08-11 RX ORDER — LISINOPRIL 2.5 MG/1
40 TABLET ORAL DAILY
Refills: 0 | Status: DISCONTINUED | OUTPATIENT
Start: 2020-08-11 | End: 2020-08-13

## 2020-08-11 RX ORDER — ACETAMINOPHEN 500 MG
650 TABLET ORAL ONCE
Refills: 0 | Status: COMPLETED | OUTPATIENT
Start: 2020-08-11 | End: 2020-08-11

## 2020-08-11 RX ORDER — GLUCAGON INJECTION, SOLUTION 0.5 MG/.1ML
1 INJECTION, SOLUTION SUBCUTANEOUS ONCE
Refills: 0 | Status: DISCONTINUED | OUTPATIENT
Start: 2020-08-11 | End: 2020-08-13

## 2020-08-11 RX ORDER — OXYBUTYNIN CHLORIDE 5 MG
5 TABLET ORAL
Refills: 0 | Status: DISCONTINUED | OUTPATIENT
Start: 2020-08-11 | End: 2020-08-13

## 2020-08-11 RX ORDER — INSULIN GLARGINE 100 [IU]/ML
15 INJECTION, SOLUTION SUBCUTANEOUS AT BEDTIME
Refills: 0 | Status: DISCONTINUED | OUTPATIENT
Start: 2020-08-11 | End: 2020-08-13

## 2020-08-11 RX ORDER — FUROSEMIDE 40 MG
40 TABLET ORAL ONCE
Refills: 0 | Status: COMPLETED | OUTPATIENT
Start: 2020-08-11 | End: 2020-08-11

## 2020-08-11 RX ORDER — METOPROLOL TARTRATE 50 MG
200 TABLET ORAL DAILY
Refills: 0 | Status: DISCONTINUED | OUTPATIENT
Start: 2020-08-11 | End: 2020-08-13

## 2020-08-11 RX ORDER — AMLODIPINE BESYLATE 2.5 MG/1
10 TABLET ORAL DAILY
Refills: 0 | Status: DISCONTINUED | OUTPATIENT
Start: 2020-08-11 | End: 2020-08-13

## 2020-08-11 RX ADMIN — ENOXAPARIN SODIUM 40 MILLIGRAM(S): 100 INJECTION SUBCUTANEOUS at 12:11

## 2020-08-11 RX ADMIN — INSULIN GLARGINE 15 UNIT(S): 100 INJECTION, SOLUTION SUBCUTANEOUS at 22:32

## 2020-08-11 RX ADMIN — QUETIAPINE FUMARATE 100 MILLIGRAM(S): 200 TABLET, FILM COATED ORAL at 22:26

## 2020-08-11 RX ADMIN — BUDESONIDE AND FORMOTEROL FUMARATE DIHYDRATE 2 PUFF(S): 160; 4.5 AEROSOL RESPIRATORY (INHALATION) at 21:23

## 2020-08-11 RX ADMIN — SENNA PLUS 2 TABLET(S): 8.6 TABLET ORAL at 22:27

## 2020-08-11 RX ADMIN — Medication 40 MILLIGRAM(S): at 08:57

## 2020-08-11 RX ADMIN — ATORVASTATIN CALCIUM 80 MILLIGRAM(S): 80 TABLET, FILM COATED ORAL at 22:27

## 2020-08-11 RX ADMIN — LISINOPRIL 40 MILLIGRAM(S): 2.5 TABLET ORAL at 12:20

## 2020-08-11 RX ADMIN — Medication 650 MILLIGRAM(S): at 07:00

## 2020-08-11 RX ADMIN — Medication 145 MILLIGRAM(S): at 12:13

## 2020-08-11 RX ADMIN — Medication 0: at 12:18

## 2020-08-11 RX ADMIN — Medication 30 MICROGRAM(S)/MIN: at 07:17

## 2020-08-11 RX ADMIN — Medication 81 MILLIGRAM(S): at 12:17

## 2020-08-11 RX ADMIN — Medication 10 MILLIGRAM(S): at 17:50

## 2020-08-11 RX ADMIN — Medication 200 MILLIGRAM(S): at 12:20

## 2020-08-11 RX ADMIN — RISPERIDONE 1 MILLIGRAM(S): 4 TABLET ORAL at 12:13

## 2020-08-11 RX ADMIN — Medication 0.4 MILLIGRAM(S): at 06:50

## 2020-08-11 RX ADMIN — AMLODIPINE BESYLATE 10 MILLIGRAM(S): 2.5 TABLET ORAL at 12:22

## 2020-08-11 RX ADMIN — CLOPIDOGREL BISULFATE 75 MILLIGRAM(S): 75 TABLET, FILM COATED ORAL at 12:17

## 2020-08-11 RX ADMIN — POLYETHYLENE GLYCOL 3350 17 GRAM(S): 17 POWDER, FOR SOLUTION ORAL at 12:11

## 2020-08-11 RX ADMIN — Medication 5 UNIT(S): at 12:18

## 2020-08-11 RX ADMIN — Medication 5 MILLIGRAM(S): at 17:51

## 2020-08-11 RX ADMIN — RISPERIDONE 3 MILLIGRAM(S): 4 TABLET ORAL at 22:27

## 2020-08-11 RX ADMIN — Medication 40 MILLIGRAM(S): at 17:51

## 2020-08-11 RX ADMIN — Medication 5 UNIT(S): at 17:49

## 2020-08-11 NOTE — H&P ADULT - NSICDXFAMILYHX_GEN_ALL_CORE_FT
This is a new problem.  Patient reports he is had some irritation on his nose, he has been referred to dermatology although they cannot get him in for another month.  Reports itching on his nose and dry skin.     No pertinent family history in first degree relatives

## 2020-08-11 NOTE — ED PROVIDER NOTE - CLINICAL SUMMARY MEDICAL DECISION MAKING FREE TEXT BOX
pt presents w/ acute dyspnea, 2/2 acute pulm edema, likely 2/2 med noncompliance from chf. sx improving w/ lasix/nitro/bipap. able to ween patient throughout ed period. will admit to tele

## 2020-08-11 NOTE — ED ADULT NURSE REASSESSMENT NOTE - NS ED NURSE REASSESS COMMENT FT1
patient requested to keep all of her property at bedside.  Property placed in bag with patient label. Patient is in gown and red socks.  Will continue to monitor.

## 2020-08-11 NOTE — H&P ADULT - NSHPLABSRESULTS_GEN_ALL_CORE
13.8   8.92  )-----------( 362      ( 11 Aug 2020 07:16 )             46.4     08-11    145  |  107  |  10  ----------------------------<  298<H>  4.3   |  23  |  1.1    Ca    9.6      11 Aug 2020 07:16  Mg     2.0     08-11    TPro  7.3  /  Alb  4.3  /  TBili  0.8  /  DBili  x   /  AST  21  /  ALT  11  /  AlkPhos  65  08-11    < from: Transthoracic Echocardiogram (02.19.20 @ 11:05) >    Summary:   1. Moderately decreased segmental left ventricular systolic function.   2. LV Ejection Fraction by Urena's Method with a biplane EF of 38 %.   3. Spectral Doppler shows pseudonormal pattern of left ventricular myocardial filling (Grade IIdiastolic dysfunction).   4. Mild to moderate mitral valve regurgitation.   5. The mitral valve leaflets are tethered which is due to reduced systolic function and elevated LVDP.   6. Mild-moderate tricuspid regurgitation.   7. Mild aortic regurgitation.   8. Sclerotic aortic valve with normal opening.   9. Estimated pulmonary artery systolic pressure is 59.7 mmHg assuming a right atrial pressure of 15 mmHg, which is consistent with moderate pulmonary hypertension.    < end of copied text >

## 2020-08-11 NOTE — ED PROVIDER NOTE - PROGRESS NOTE DETAILS
Rectal temp 100.4. Blood cultures sent and rectal tylenol administered. Nitro drip being titrated down. Pt on NC 4L satting well. Nitro d/c'd. 1x dose of Lasix ordered. Pt will be admitted to [facility] for acute pulmonary edema.

## 2020-08-11 NOTE — H&P ADULT - ASSESSMENT
55 year old female with a pmhx of HFrEF 38%, HTN, HLD, DM COPD on 2L Home O2 presents of shortness of breath.     # HFrEF exacerbation  - Secondary to medication non compliance  - CXR showed vascular congestion  - BNP 2867  - c/w lasix   - ECG showed no new changes  - Daily weight, strict I/Os, fluid restriction  - c/w oxygen supplementation and titrate as tolerated. Patient is on 2 L o2 at baseline  - TTE in 2/2020 EF 38 with G2DD and moderate PHTN  - troponin first set negative, f/u 2nd set    # Hypertensive emergency  - /139 on admission  - s/p lasix and nitro infusion  - Last /87  - c/w lasix, norvasc,  lisinopril and metoprolol    # DM  - basal and nutritional insulin with sliding scale  - f/u BS and adjust if needed    # HLD  - c/w statin    # COPD  - not in exacerbation  - symbicort and duoneb    # DVT ppx: lovenox  # PPI ppx: no need  # DIET: DASH carb consistent  # ACTIVITY: IAT  # DISPO: From home

## 2020-08-11 NOTE — ED PROVIDER NOTE - OBJECTIVE STATEMENT
The pt is a 55y F w/ hx of CHF, COPD on 2L baseline at home, asthma BIBA for respiratory distress. Symptoms started about 3 hours ago. Per EMS, pt was satting 60s on her baseline oxygen, 80s on NRB. In the ED, pt was hypertensive and tachycardic. BL pulmonary B-lines on U/S. BIPAP initiated and nitroglycerin sublingual 400mcg + infusion started w/ rapid improvement in pt's breathing. Pt denies fever, headache, cough, chest pain, N/V, abdominal pain, diarrhea/constipation. The pt is a 55y F w/ hx of HFrEF 38%,COPD on 2L baseline at home, asthma BIBA for respiratory distress. Symptoms started about 3 hours ago. Per EMS, pt was satting 60s on her baseline oxygen, 80s on NRB. In the ED, pt was hypertensive and tachycardic. BL pulmonary B-lines on U/S. BIPAP initiated and nitroglycerin sublingual 400mcg + infusion started w/ rapid improvement in pt's breathing. Pt denies fever, headache, cough, chest pain, N/V, abdominal pain, diarrhea/constipation.

## 2020-08-11 NOTE — H&P ADULT - ATTENDING COMMENTS
I saw and evaluated the patient. I have reviewed and agree with the findings and plan of care as documented above in the resident’s note (unless indicated differently below). Any necessary changes were made in the body of the text.    Pt complaining that at home she feels the need to urinate but when she goes to the bathroom, nothing comes out.  Came in because of severe SOB.    Impression:  Acute on chronic HFrEF  Hypertensive urgency on presentation    Plan:  Strict I&O (monitor UOP ~ 400 cc in primafit container - although uncertain when it was placed or whether it was emptied)  Daily weights  Diuresis (goal net negative 1.5 L daily)  Medications as dosed  Supportive care    Luis Antonio # [id number] I saw and evaluated the patient on 08/11/2020 - note edited thereafter for further clarification. I have reviewed and agree with the findings and plan of care as documented above in the resident’s note (unless indicated differently below). Any necessary changes were made in the body of the text.    54 yo F pt w/ a relevant hx of HFrEF (LVEF of 38% w/ grade 2 dd in 02/2020) and COPD (on 2 L home O2) p/w SOB. Onset and course: gradual and became severe a few hours prior to arrival. Intensity: severe (per EMS, SpO2 was in the 60s and went up to 80s on NRBR). Precipitating factors: med non-compliance (not taking furosemide x 3 days). Alleviating factors: none. Aggravating factors: none. Also c/o difficulty passing urine when she goes to the bathroom "nothing comes out." Otherwise denies other concerns/complaints.    ROS:  Constitutional: no fevers; no chills  Eyes: no conjunctivitis; no itching  ENT: no dysphagia; no odynophagia  CVS: +orthopnea; no chest pain  Resp: +SOB; no coughing  GI: no nausea; no vomiting; no diarrhea; no abd pain  : no dysuria; no hematuria; +decrease in output  MSK: no myalgias; no arthralgias  Skin: no rashes; no ulcers  Neuro: no focal weakness; no headache  All other systems reviewed and are negative    PMHx, home medications, SurHx, FHx and Social history as above in the corresponding sections of the note - reviewed and edited where appropriate    Exam:  Vitals: BP = 113/70; P = 60; T = 98.6; RR = 20; SpO2 > 95 on 3 L/min via NC  General: appears stated age; cooperative  Eyes: anicteric sclera; moist conjunctiva; PERRL; EOMI  HENT: NC/AT; clear oropharynx; MMM; nL hard/soft palate  Neck: supple w/ FROM; trachea midline  Lungs: no tachypnea, accessory muscle usage, wheezing or rhonci; basilar rales  CVS: RRR; S1 and S2 w/o MRGs  Abd: BS+; soft; non-tender to palpation x 4; no masses or HSM  Ext: trace edema; pulses palpable distally  Skin: no breakdown; normal temp and turgor; no nodules noted  Neuro: CN II-XII intact; str and sensation grossly intact  Psych: appropriate affect; alert and oriented to person, place and somewhat to situation    Labs significant for gluc 298, BUN/Cr 10/1.1, proBNP 2,867, pH 7.42, pCO2 48  Covid-19 PCR not detected  CXR: cardiomegaly w/ congestive changes  EKG: NSR; RAD; conduction abnormality w/ assoc T w changes; qTC 490  TTE (02/2020): LVEF of 38% w/ grade 2 diast dysfunc; mod MV regurg; mild AR; mod pulm HTN    Assessment:  (1) Hypoxic resp failure 2/2 acute on chronic HFrEF w/ diast dysfunc  --- Precipitated by medication non-compliance  (2) Hypertensive urgency on presentation   --- Possibly hypertensive pulmonary edema (improved s/p nitro/bi-lvl)  (3) DM w/ hyperglycemia  (4) CKD 2 likely diabetic nephropathy (renal function at baseline)  (5) COPD (on home O2) - not in acute exacerbation  (6) Possibly has severe pulm HTN (possibly group 2)  --- Moderate as per ECHO but low EF so probable underestimation   (7) Anxiety / depression - stable mood  (8) Urinary symp's - suspect that this is assoc w/ hx of overactive bladder    Plan:  (1) Monitor respiratory status  (2) Bi-lvl qHS and PRN  (3) Supplemental O2 at 2 L/min (titrate up PRN) when not on bi-lvl  (4) Diuresis w/ strict intake and output monitoring (goal net neg 1.5 L daily)  (5) Basal bolus insulin regimen - titrate as necessary (goal BG < 180 mg/dL)  (6) Meds as dosed  (7) Supportive care  (8) D/c planning: tentatively in 48-72 hrs   --- [Pending adequate diuresis, clinical improvement]  --- Continue to emphasize importance of med compliance and regular f/u    Code status: full code    Luis Antonio # [id number]

## 2020-08-11 NOTE — ED ADULT NURSE NOTE - OBJECTIVE STATEMENT
pt BIBA from home for respiratory distress. pt with SPO2 of 60% at home, when EMS arrived palced pt on NRB and SPO2 came up to 80s,   in ED, pt with increased work of breathing, pt also cool & clammy

## 2020-08-11 NOTE — CONSULT NOTE ADULT - PROVIDER SPECIALTY LIST ADULT
HISTORY OF PRESENT ILLNESS  Javan Asencio is a 46 y.o. male. Diabetes  SUBJECTIVE:  46 y.o. male for follow up of diabetes after starting insulin. Diabetic Review of Systems - medication compliance: compliant most of the time, diabetic diet compliance: noncompliant much of the time, home glucose monitoring: is performed regularly, further diabetic ROS: no polyuria or polydipsia, no chest pain, dyspnea or TIA's, no numbness, tingling or pain in extremities. .Pt started at 20 units of insulin with a reading of 190 in the morning. He increased to 22 units and has readings of 190-200. He reports not getting a lot of sleep at night and believes his blood sugar is high due to only fasting for a few hours at night. Hypertension  Subjective:   Javan Asencio is a 46 y.o. male with hypertension. Pt reports no complications and states that he is being compliant with his medications  Current Outpatient Prescriptions   Medication Sig Dispense Refill    insulin glargine (LANTUS SOLOSTAR) 100 unit/mL (3 mL) pen 30 units at night; please give month supply 1 Each 3    DULoxetine (CYMBALTA) 30 mg capsule Take 30 mg by mouth two (2) times a day.  0    glipiZIDE SR (GLUCOTROL XL) 5 mg CR tablet Take 1 Tab by mouth daily.  30 Tab 2    ONETOUCH ULTRA TEST strip TEST three times a day 100 Strip 5    Blood-Glucose Meter monitoring kit Pt needs One Touch Ultra glucometer to test blood sugars three times daily E11.9 1 Kit 0    TRADJENTA 5 mg tablet TAKE 1 TABLET BY MOUTH DAILY 30 Tab 3    lisinopril (PRINIVIL, ZESTRIL) 40 mg tablet take 1 tablet by mouth once daily 30 Tab 3    hydroCHLOROthiazide (HYDRODIURIL) 25 mg tablet take 1 tablet by mouth once daily 30 Tab 3    omeprazole (PRILOSEC) 20 mg capsule take 1 capsule by mouth once daily 30 Cap 5    diltiazem CD (CARDIZEM CD) 240 mg ER capsule take 1 capsule by mouth once daily 30 Cap 5    simvastatin (ZOCOR) 20 mg tablet take 1 tablet by mouth NIGHTLY 30 Tab 5    albuterol (PROAIR HFA) 90 mcg/actuation inhaler Take 1 Puff by inhalation every four (4) hours as needed for Wheezing or Shortness of Breath. 1 Inhaler 1    fentaNYL (DURAGESIC) 50 mcg/hr PATCH 1 Patch by TransDERmal route every seventy-two (72) hours.  fexofenadine (ALLEGRA) 180 mg tablet Take  by mouth.  cyclobenzaprine (FLEXERIL) 10 mg tablet Take  by mouth three (3) times daily as needed for Muscle Spasm(s).  polyethylene glycol (MIRALAX) 17 gram/dose powder Take 17 g by mouth daily.  tramadol (ULTRAM) 50 mg tablet Take 50 mg by mouth four (4) times daily.  meloxicam (MOBIC) 7.5 mg tablet Take  by mouth daily.  ergocalciferol (ERGOCALCIFEROL) 50,000 unit capsule take 1 capsule by mouth every week  0    lisinopril-hydrochlorothiazide (PRINZIDE, ZESTORETIC) 20-25 mg per tablet take 1 tablet by mouth once daily 30 Tab 3    empagliflozin (JARDIANCE) 25 mg tablet Take 1 Tab by mouth daily for 30 days. 30 Tab 3    HYDROcodone-acetaminophen (NORCO)  mg tablet Take 1 Tab by mouth. Hypertension ROS: taking medications as instructed, no medication side effects noted, no TIA's, no chest pain on exertion, no dyspnea on exertion, no swelling of ankles. Objective:   Visit Vitals    /89 (BP 1 Location: Left arm, BP Patient Position: Sitting)    Pulse (!) 113    Temp 98.4 °F (36.9 °C) (Oral)    Resp 16    Ht 5' 11\" (1.803 m)    Wt (!) 373 lb 9.6 oz (169.5 kg)    SpO2 98%    BMI 52.11 kg/m2      Appearance alert, well appearing, and in no distress. Lab review: labs are reviewed, up to date and normal.     HPI    Review of Systems   Psychiatric/Behavioral: The patient has insomnia. Physical Exam   Constitutional: He appears well-developed and well-nourished. ASSESSMENT and PLAN    1. Uncontrolled type 2 diabetes mellitus with complication, without long-term current use of insulin (HCC)  Pt presents today as a follow up for his diabetes.  He states that he checks blood sugar levels in the morning before first meal of day. He was advised to continue taking glipizide and trigenta, increase insulin to 30 units. Pt was told to check BS more than once a day for more accurate readings and if BS is still in 200s after increasing to 30 units of insulin after 1 week, increase to 40 units. Follow up in 1 month     2. Essential hypertension  Hypertension reasonably well controlled; current treatment is effective, no change in therapy   Pt advised to continue taking Lisinopril as previously directed    3. Chronic bilateral low back pain with bilateral sciatica  Pt was advised to continue treatment as previously directed    4.  Dyslipidemia, goal LDL below 100  Pt was advised to continue treatment as previously directed       Written by Lazara Sandoval, as dictated by Dr. Lily Rogers MD. Cardiology

## 2020-08-11 NOTE — H&P ADULT - HISTORY OF PRESENT ILLNESS
55 year old female with a pmhx of HFrEF 38%, HTN, HLD, DM COPD on 2L Home O2 presents of shortness of breath. Symptoms started 3 hours prior to presentation with gradual onset of shortness of breath and desaturation. She was brought in by EMT who mentioned that her saturation was in 60s on her baseline home oxygen and in the 80s on non rebreather face mask. She mentioned that she stopped taking her dose of lasix 3 days ago. She denied other associated symptoms including chest pain, cough, fevers, chills, increase in sputum production, nausea or vomiting or other associated symptoms.    In the ED VS /139  Spo2 86 on non rebreather face mask.  CXR showed vascular congestion. She was put on bipap and received lasix iv and was started on nitroglycerin infusion.

## 2020-08-11 NOTE — CONSULT NOTE ADULT - ASSESSMENT
55 y.o female patient with PMH of HFrEF 38%, HTN, HLD, DM, COPD on 3L Home O2, CVA with residual left sided weakness, presents of shortness of breath; found to be in fluid overload and BP of 239/139    # Pulmonary edema  - Acute exacerbation of chronic HFrEF  - Most likely secondary to medication non compliance (patient doesn't take her lasix daily because it "makes her go to the bathroom a lot"; last dose 3 days ago)  - BP of 239/139  - Symptoms significantly improved on BPAP and Nitro drip; now on O2 nasal canula (4L/min); at baseline, patient is on 3L/min  - BP now 152/80: avoid further drop in BP within 24 hours; now off nitro drip  - Admit to telemetry  - IV lasix 40mg q12h: monitor renal function, electrolytes, daily weight and volume status, strict I&Os; keep I<Os and adjust diuretics accordingly  - Continue lisinopril and metoprolol succinate; monitor BP and HR  - Check serial cardiac enzymes  - Low salt diet  - Patient education on medication compliance 55 y.o female patient with PMH of HFrEF 38%, HTN, HLD, DM, COPD on 3L Home O2, CVA with residual left sided weakness, presents of shortness of breath; found to be in fluid overload and BP of 239/139    # Pulmonary edema  - Acute exacerbation of chronic HFrEF  - Most likely secondary to medication non compliance (patient doesn't take her lasix daily because it "makes her go to the bathroom a lot"; last dose 3 days ago)  - BP of 239/139  - Symptoms significantly improved on BPAP and Nitro drip; now on O2 nasal canula (4L/min); at baseline, patient is on 3L/min  - BP now 152/80: avoid further drop in BP within 24 hours; now off nitro drip  - Admit to telemetry  - IV lasix 40mg q12h: monitor renal function, electrolytes, daily weight and volume status, strict I&Os; keep I<Os and adjust diuretics accordingly  - Continue lisinopril and metoprolol succinate; monitor BP and HR  - Check serial cardiac enzymes  - Low salt diet  - Patient education on medication compliance    history of non compliance.    plan is to diurese and get outp fu.

## 2020-08-11 NOTE — ED PROVIDER NOTE - PHYSICAL EXAMINATION
Vital Signs: I have reviewed the initial vital signs.  Constitutional: well-nourished, appears stated age  Cardiovascular: regular rate, regular rhythm, well-perfused extremities  Respiratory: (+) respiratory distress w/ accessory muscle use, BL crackles + wheeze   Gastrointestinal: soft, non-tender abdomen  Musculoskeletal: supple neck, no lower extremity edema  Integumentary: warm, dry, no rash  Neurologic: awake, alert, extremities’ motor and sensory functions grossly intact  Psychiatric: appropriate mood, appropriate affect

## 2020-08-11 NOTE — ED ADULT NURSE NOTE - NS ED NURSE LEVEL OF CONSCIOUSNESS MENTAL STATUS
VSS. FUNDUS FIRM, U2 WITH SCANT AMT RUBRA LOCHIA. PT REPORTS THAT SHE IS VERY
SLEEPY, REQUESTS INFANT BE TAKEN TO NBN SO THAT SHE CAN REST. DENIES PAIN AT
THIS TIME. BED IN LOW POSITION WITH UPPER SIDE RAILS RAISED X2. CL AND PHONE
WITHIN REACH. INFANT TAKEN TO NBN. WILL CONT TO MONITOR AND ASSIST PRN. Awake/Alert/Cooperative

## 2020-08-11 NOTE — ED ADULT TRIAGE NOTE - CHIEF COMPLAINT QUOTE
prenotification and BIBA for respiratory distress  pt with SPO@ 60% on RA, placed on NRB via EMS at 15 liters, SPO2 87%

## 2020-08-11 NOTE — CONSULT NOTE ADULT - SUBJECTIVE AND OBJECTIVE BOX
HPI:  55 y.o female patient with PMH of HFrEF 38%, HTN, HLD, DM, COPD on 3L Home O2 presents of shortness of breath. Symptoms started 3 hours prior to presentation with gradual onset of shortness of breath and desaturation. She was brought in by EMT who mentioned that her saturation was in 60s on her baseline home oxygen and in the 80s on non rebreather face mask. She mentioned that she stopped taking her dose of lasix 3 days ago. She denied other associated symptoms including chest pain, cough, fevers, chills, increase in sputum production, nausea or vomiting or other associated symptoms.    In the ED VS /139  Spo2 86 on non rebreather face mask.  CXR showed vascular congestion. She was put on bipap and was started on nitroglycerin infusion.    PAST MEDICAL & SURGICAL HISTORY  CKD (chronic kidney disease), stage II  Type 2 diabetes mellitus  Cerebrovascular accident (CVA): Multiple  CHF (congestive heart failure)  COPD, severe  Anxiety and depression  Hyperlipidemia  Hypertension      FAMILY HISTORY:  FAMILY HISTORY: none      SOCIAL HISTORY:  []smoker:   []Alcohol  []Drug    ALLERGIES:  No Known Allergies      MEDICATIONS:  MEDICATIONS  (STANDING):  amLODIPine   Tablet 10 milliGRAM(s) Oral daily  aspirin  chewable 81 milliGRAM(s) Oral daily  atorvastatin 80 milliGRAM(s) Oral at bedtime  budesonide 160 MICROgram(s)/formoterol 4.5 MICROgram(s) Inhaler 2 Puff(s) Inhalation two times a day  busPIRone 10 milliGRAM(s) Oral two times a day  clopidogrel Tablet 75 milliGRAM(s) Oral daily  dextrose 5%. 1000 milliLiter(s) (50 mL/Hr) IV Continuous <Continuous>  dextrose 50% Injectable 12.5 Gram(s) IV Push once  dextrose 50% Injectable 25 Gram(s) IV Push once  dextrose 50% Injectable 25 Gram(s) IV Push once  enoxaparin Injectable 40 milliGRAM(s) SubCutaneous daily  fenofibrate Tablet 145 milliGRAM(s) Oral daily  furosemide    Tablet 40 milliGRAM(s) Oral two times a day  insulin glargine Injectable (LANTUS) 15 Unit(s) SubCutaneous at bedtime  insulin lispro (HumaLOG) corrective regimen sliding scale   SubCutaneous three times a day before meals  insulin lispro Injectable (HumaLOG) 5 Unit(s) SubCutaneous three times a day before meals  lisinopril 40 milliGRAM(s) Oral daily  metoprolol succinate  milliGRAM(s) Oral daily  oxybutynin 5 milliGRAM(s) Oral two times a day  polyethylene glycol 3350 Oral Powder - Peds 17 Gram(s) Oral daily  QUEtiapine 100 milliGRAM(s) Oral at bedtime  risperiDONE   Tablet 1 milliGRAM(s) Oral daily  risperiDONE   Tablet 3 milliGRAM(s) Oral at bedtime  senna 2 Tablet(s) Oral at bedtime    MEDICATIONS  (PRN):  albuterol/ipratropium for Nebulization 3 milliLiter(s) Nebulizer every 6 hours PRN Shortness of Breath and/or Wheezing  bisacodyl Suppository 10 milliGRAM(s) Rectal daily PRN Constipation  dextrose 40% Gel 15 Gram(s) Oral once PRN Blood Glucose LESS THAN 70 milliGRAM(s)/deciliter  glucagon  Injectable 1 milliGRAM(s) IntraMuscular once PRN Glucose LESS THAN 70 milligrams/deciliter  nitroglycerin     SubLingual 0.4 milliGRAM(s) SubLingual every 5 minutes PRN Chest Pain      HOME MEDICATIONS:  Home Medications:  aspirin 81 mg oral tablet: 1 tab(s) orally once a day (08 Jun 2020 11:26)  bisacodyl 10 mg rectal suppository: 1 suppository(ies) rectal once a day, As needed, Constipation (16 Jul 2020 08:58)  BuSpar 10 mg oral tablet: 3 tab(s) orally 2 times a day (08 Jun 2020 11:26)  doxepin 50 mg oral capsule: 1 cap(s) orally once a day (08 Jun 2020 11:26)  fenofibrate 145 mg oral tablet: 1 tab(s) orally once a day (08 Jun 2020 11:26)  glipiZIDE 10 mg oral tablet: 1 tab(s) orally 2 times a day (08 Jun 2020 11:26)  Lipitor 80 mg oral tablet: 1 tab(s) orally once a day (08 Jun 2020 11:26)  lisinopril 40 mg oral tablet: 1 tab(s) orally once a day (08 Jun 2020 11:26)  metFORMIN 1000 mg oral tablet: 1 tab(s) orally 2 times a day (08 Jun 2020 11:26)  Norvasc 10 mg oral tablet: 1 tab(s) orally once a day (08 Jun 2020 11:26)  oxybutynin 5 mg oral tablet: 1 tab(s) orally 2 times a day (08 Jun 2020 11:26)  Plavix 75 mg oral tablet: 1 tab(s) orally once a day (08 Jun 2020 11:26)  RisperDAL 1 mg oral tablet: 1 tab(s) orally once a day in AM (08 Jun 2020 11:26)  RisperDAL 3 mg oral tablet: 1 tab(s) orally once a day (at bedtime) (08 Jun 2020 11:26)  SEROquel 100 mg oral tablet: 1 tab(s) orally once a day (at bedtime) (08 Jun 2020 11:26)  Vitamin D3 50,000 intl units (1250 mcg) oral capsule: 1 cap(s) orally once a week (08 Jun 2020 11:26)      VITALS:   T(F): 97.6 (08-11 @ 07:29), Max: 100.4 (08-11 @ 07:00)  HR: 69 (08-11 @ 10:42) (69 - 137)  BP: 140/86 (08-11 @ 10:42) (106/59 - 239/139)  BP(mean): --  RR: 18 (08-11 @ 10:42) (17 - 30)  SpO2: 97% (08-11 @ 10:42) (86% - 100%)    I&O's Summary      REVIEW OF SYSTEMS:  CONSTITUTIONAL: No weakness, fevers or chills  EYES: No visual changes  ENT: No vertigo or throat pain   NECK: No pain or stiffness  RESPIRATORY: No cough, wheezing, hemoptysis; No shortness of breath  CARDIOVASCULAR: No chest pain or palpitations  GASTROINTESTINAL: No abdominal or epigastric pain. No nausea, vomiting, or hematemesis; No diarrhea or constipation. No melena or hematochezia.  GENITOURINARY: No dysuria, frequency or hematuria  NEUROLOGICAL: No numbness or weakness  SKIN: No itching, no rashes  MSK: No pain    PHYSICAL EXAM:  NEURO: patient is awake , alert and oriented  GEN: Not in acute distress  NECK: no thyroid enlargement, no JVD  LUNGS: Clear to auscultation bilaterally   CARDIOVASCULAR: S1/S2 present, RRR , no murmurs or rubs, no carotid bruits,  + PP bilaterally  ABD: Soft, non-tender, non-distended, +BS  EXT: No LILIA  SKIN: Intact    LABS:                        13.8   8.92  )-----------( 362      ( 11 Aug 2020 07:16 )             46.4     08-11    145  |  107  |  10  ----------------------------<  298<H>  4.3   |  23  |  1.1    Ca    9.6      11 Aug 2020 07:16  Mg     2.0     08-11    TPro  7.3  /  Alb  4.3  /  TBili  0.8  /  DBili  x   /  AST  21  /  ALT  11  /  AlkPhos  65  08-11      Troponin T, Serum: <0.01 ng/mL (08-11-20 @ 07:16)    CARDIAC MARKERS ( 11 Aug 2020 07:16 )  x     / <0.01 ng/mL / x     / x     / x            Troponin trend:    Serum Pro-Brain Natriuretic Peptide: 2867 pg/mL (08-11-20 @ 07:16)          RADIOLOGY:  -CXR:  -TTE:  -CCTA:  -STRESS TEST:  -CATHETERIZATION:    ECG:    TELEMETRY EVENTS: HPI:  55 y.o female patient with PMH of HFrEF 38%, HTN, HLD, DM, COPD on 3L Home O2 presents of shortness of breath. Symptoms started 3 hours prior to presentation with gradual onset of shortness of breath and desaturation. She was brought in by EMT who mentioned that her saturation was in 60s on her baseline home oxygen and in the 80s on non rebreather face mask. She mentioned that she stopped taking her dose of lasix 3 days ago. She denied other associated symptoms including chest pain, cough, fevers, chills, increase in sputum production, nausea or vomiting or other associated symptoms.    In the ED VS /139  Spo2 86 on non rebreather face mask.  CXR showed vascular congestion. She was put on bipap and was started on nitroglycerin infusion, with significant improvement of symptoms    PAST MEDICAL & SURGICAL HISTORY  CKD (chronic kidney disease), stage II  Type 2 diabetes mellitus  Cerebrovascular accident (CVA): Multiple  CHF (congestive heart failure)  COPD, severe  Anxiety and depression  Hyperlipidemia  Hypertension      FAMILY HISTORY:  FAMILY HISTORY: none      SOCIAL HISTORY:  []smoker: still smokes 10 cigarettes/day  []Alcohol: none  []Drug: none    ALLERGIES:  No Known Allergies      MEDICATIONS:  MEDICATIONS  (STANDING):  amLODIPine   Tablet 10 milliGRAM(s) Oral daily  aspirin  chewable 81 milliGRAM(s) Oral daily  atorvastatin 80 milliGRAM(s) Oral at bedtime  budesonide 160 MICROgram(s)/formoterol 4.5 MICROgram(s) Inhaler 2 Puff(s) Inhalation two times a day  busPIRone 10 milliGRAM(s) Oral two times a day  clopidogrel Tablet 75 milliGRAM(s) Oral daily  dextrose 5%. 1000 milliLiter(s) (50 mL/Hr) IV Continuous <Continuous>  dextrose 50% Injectable 12.5 Gram(s) IV Push once  dextrose 50% Injectable 25 Gram(s) IV Push once  dextrose 50% Injectable 25 Gram(s) IV Push once  enoxaparin Injectable 40 milliGRAM(s) SubCutaneous daily  fenofibrate Tablet 145 milliGRAM(s) Oral daily  furosemide    Tablet 40 milliGRAM(s) Oral two times a day  insulin glargine Injectable (LANTUS) 15 Unit(s) SubCutaneous at bedtime  insulin lispro (HumaLOG) corrective regimen sliding scale   SubCutaneous three times a day before meals  insulin lispro Injectable (HumaLOG) 5 Unit(s) SubCutaneous three times a day before meals  lisinopril 40 milliGRAM(s) Oral daily  metoprolol succinate  milliGRAM(s) Oral daily  oxybutynin 5 milliGRAM(s) Oral two times a day  polyethylene glycol 3350 Oral Powder - Peds 17 Gram(s) Oral daily  QUEtiapine 100 milliGRAM(s) Oral at bedtime  risperiDONE   Tablet 1 milliGRAM(s) Oral daily  risperiDONE   Tablet 3 milliGRAM(s) Oral at bedtime  senna 2 Tablet(s) Oral at bedtime    MEDICATIONS  (PRN):  albuterol/ipratropium for Nebulization 3 milliLiter(s) Nebulizer every 6 hours PRN Shortness of Breath and/or Wheezing  bisacodyl Suppository 10 milliGRAM(s) Rectal daily PRN Constipation  dextrose 40% Gel 15 Gram(s) Oral once PRN Blood Glucose LESS THAN 70 milliGRAM(s)/deciliter  glucagon  Injectable 1 milliGRAM(s) IntraMuscular once PRN Glucose LESS THAN 70 milligrams/deciliter  nitroglycerin     SubLingual 0.4 milliGRAM(s) SubLingual every 5 minutes PRN Chest Pain      HOME MEDICATIONS:  Home Medications:  aspirin 81 mg oral tablet: 1 tab(s) orally once a day (08 Jun 2020 11:26)  bisacodyl 10 mg rectal suppository: 1 suppository(ies) rectal once a day, As needed, Constipation (16 Jul 2020 08:58)  BuSpar 10 mg oral tablet: 3 tab(s) orally 2 times a day (08 Jun 2020 11:26)  doxepin 50 mg oral capsule: 1 cap(s) orally once a day (08 Jun 2020 11:26)  fenofibrate 145 mg oral tablet: 1 tab(s) orally once a day (08 Jun 2020 11:26)  glipiZIDE 10 mg oral tablet: 1 tab(s) orally 2 times a day (08 Jun 2020 11:26)  Lipitor 80 mg oral tablet: 1 tab(s) orally once a day (08 Jun 2020 11:26)  lisinopril 40 mg oral tablet: 1 tab(s) orally once a day (08 Jun 2020 11:26)  metFORMIN 1000 mg oral tablet: 1 tab(s) orally 2 times a day (08 Jun 2020 11:26)  Norvasc 10 mg oral tablet: 1 tab(s) orally once a day (08 Jun 2020 11:26)  oxybutynin 5 mg oral tablet: 1 tab(s) orally 2 times a day (08 Jun 2020 11:26)  Plavix 75 mg oral tablet: 1 tab(s) orally once a day (08 Jun 2020 11:26)  RisperDAL 1 mg oral tablet: 1 tab(s) orally once a day in AM (08 Jun 2020 11:26)  RisperDAL 3 mg oral tablet: 1 tab(s) orally once a day (at bedtime) (08 Jun 2020 11:26)  SEROquel 100 mg oral tablet: 1 tab(s) orally once a day (at bedtime) (08 Jun 2020 11:26)  Vitamin D3 50,000 intl units (1250 mcg) oral capsule: 1 cap(s) orally once a week (08 Jun 2020 11:26)      VITALS:   T(F): 97.6 (08-11 @ 07:29), Max: 100.4 (08-11 @ 07:00)  HR: 69 (08-11 @ 10:42) (69 - 137)  BP: 140/86 (08-11 @ 10:42) (106/59 - 239/139)  BP(mean): --  RR: 18 (08-11 @ 10:42) (17 - 30)  SpO2: 97% (08-11 @ 10:42) (86% - 100%)    I&O's Summary      REVIEW OF SYSTEMS:  CONSTITUTIONAL: No weakness, fevers or chills  EYES: No visual changes  ENT: No vertigo or throat pain   NECK: No pain or stiffness  RESPIRATORY: Shortness of breath as described in HPI  CARDIOVASCULAR: No chest pain or palpitations  GASTROINTESTINAL: No abdominal or epigastric pain. No nausea, vomiting, or hematemesis; No diarrhea or constipation. No melena or hematochezia.  GENITOURINARY: No dysuria, frequency or hematuria  NEUROLOGICAL: No numbness or weakness  SKIN: No itching, no rashes  MSK: No pain    PHYSICAL EXAM:  NEURO: patient is awake , alert and oriented  GEN: Not in acute distress  NECK: no thyroid enlargement, no JVD  LUNGS: Bibasilar crackles heard  CARDIOVASCULAR: S1/S2 present, RRR   ABD: Soft, non-tender, non-distended  EXT: 1+ bilateral LE edema  SKIN: Intact    LABS:                        13.8   8.92  )-----------( 362      ( 11 Aug 2020 07:16 )             46.4     08-11    145  |  107  |  10  ----------------------------<  298<H>  4.3   |  23  |  1.1    Ca    9.6      11 Aug 2020 07:16  Mg     2.0     08-11    TPro  7.3  /  Alb  4.3  /  TBili  0.8  /  DBili  x   /  AST  21  /  ALT  11  /  AlkPhos  65  08-11      Troponin T, Serum: <0.01 ng/mL (08-11-20 @ 07:16)    CARDIAC MARKERS ( 11 Aug 2020 07:16 )  x     / <0.01 ng/mL / x     / x     / x          Troponin trend:    Serum Pro-Brain Natriuretic Peptide: 2867 pg/mL (08-11-20 @ 07:16)    RADIOLOGY:  -CXR:  < from: Xray Chest 1 View-PORTABLE IMMEDIATE (08.11.20 @ 08:22) >  Impression:    cardiomegaly with congestive changes, unchanged.    < end of copied text >  	  -TTE:  < from: Transthoracic Echocardiogram (02.19.20 @ 11:05) >  Summary:   1. Moderately decreased segmental left ventricular systolic function.   2. LV Ejection Fraction by Urena's Method with a biplane EF of 38 %.   3. Spectral Doppler shows pseudonormal pattern of left ventricular myocardial filling (Grade IIdiastolic dysfunction).   4. Mild to moderate mitral valve regurgitation.   5. The mitral valve leaflets are tethered which is due to reduced systolic function and elevated LVDP.   6. Mild-moderate tricuspid regurgitation.   7. Mild aortic regurgitation.   8. Sclerotic aortic valve with normal opening.   9. Estimated pulmonary artery systolic pressure is 59.7 mmHg assuming a right atrial pressure of 15 mmHg, which is consistent with moderate pulmonary hypertension.    < end of copied text >    -CCTA:  -STRESS TEST:  -CATHETERIZATION in 2016  Non-obstructive CAD: Distal LCx 60%, Prox and Mid RCA 20%    ECG: sinus tachycardia, LA enlargement, IVCD    TELEMETRY EVENTS: HPI:  55 y.o female patient with PMH of HFrEF 38%, HTN, HLD, DM, COPD on 3L Home O2 presents of shortness of breath. Symptoms started 3 hours prior to presentation with gradual onset of shortness of breath and desaturation. She was brought in by EMT who mentioned that her saturation was in 60s on her baseline home oxygen and in the 80s on non rebreather face mask. She mentioned that she stopped taking her dose of lasix 3 days ago. She denied other associated symptoms including chest pain, cough, fevers, chills, increase in sputum production, nausea or vomiting or other associated symptoms.    In the ED VS /139  Spo2 86 on non rebreather face mask.  CXR showed vascular congestion. She was put on bipap and was started on nitroglycerin infusion, with significant improvement of symptoms    PAST MEDICAL & SURGICAL HISTORY  CKD (chronic kidney disease), stage II  Type 2 diabetes mellitus  Cerebrovascular accident (CVA): Multiple  CHF (congestive heart failure)  COPD, severe  Anxiety and depression  Hyperlipidemia  Hypertension      FAMILY HISTORY:  FAMILY HISTORY: none      SOCIAL HISTORY:  []smoker: still smokes 10 cigarettes/day  []Alcohol: none  []Drug: none    ALLERGIES:  No Known Allergies      MEDICATIONS:  MEDICATIONS  (STANDING):  amLODIPine   Tablet 10 milliGRAM(s) Oral daily  aspirin  chewable 81 milliGRAM(s) Oral daily  atorvastatin 80 milliGRAM(s) Oral at bedtime  budesonide 160 MICROgram(s)/formoterol 4.5 MICROgram(s) Inhaler 2 Puff(s) Inhalation two times a day  busPIRone 10 milliGRAM(s) Oral two times a day  clopidogrel Tablet 75 milliGRAM(s) Oral daily  dextrose 5%. 1000 milliLiter(s) (50 mL/Hr) IV Continuous <Continuous>  dextrose 50% Injectable 12.5 Gram(s) IV Push once  dextrose 50% Injectable 25 Gram(s) IV Push once  dextrose 50% Injectable 25 Gram(s) IV Push once  enoxaparin Injectable 40 milliGRAM(s) SubCutaneous daily  fenofibrate Tablet 145 milliGRAM(s) Oral daily  furosemide    Tablet 40 milliGRAM(s) Oral two times a day  insulin glargine Injectable (LANTUS) 15 Unit(s) SubCutaneous at bedtime  insulin lispro (HumaLOG) corrective regimen sliding scale   SubCutaneous three times a day before meals  insulin lispro Injectable (HumaLOG) 5 Unit(s) SubCutaneous three times a day before meals  lisinopril 40 milliGRAM(s) Oral daily  metoprolol succinate  milliGRAM(s) Oral daily  oxybutynin 5 milliGRAM(s) Oral two times a day  polyethylene glycol 3350 Oral Powder - Peds 17 Gram(s) Oral daily  QUEtiapine 100 milliGRAM(s) Oral at bedtime  risperiDONE   Tablet 1 milliGRAM(s) Oral daily  risperiDONE   Tablet 3 milliGRAM(s) Oral at bedtime  senna 2 Tablet(s) Oral at bedtime    MEDICATIONS  (PRN):  albuterol/ipratropium for Nebulization 3 milliLiter(s) Nebulizer every 6 hours PRN Shortness of Breath and/or Wheezing  bisacodyl Suppository 10 milliGRAM(s) Rectal daily PRN Constipation  dextrose 40% Gel 15 Gram(s) Oral once PRN Blood Glucose LESS THAN 70 milliGRAM(s)/deciliter  glucagon  Injectable 1 milliGRAM(s) IntraMuscular once PRN Glucose LESS THAN 70 milligrams/deciliter  nitroglycerin     SubLingual 0.4 milliGRAM(s) SubLingual every 5 minutes PRN Chest Pain      HOME MEDICATIONS:  Home Medications:  aspirin 81 mg oral tablet: 1 tab(s) orally once a day (08 Jun 2020 11:26)  bisacodyl 10 mg rectal suppository: 1 suppository(ies) rectal once a day, As needed, Constipation (16 Jul 2020 08:58)  BuSpar 10 mg oral tablet: 3 tab(s) orally 2 times a day (08 Jun 2020 11:26)  doxepin 50 mg oral capsule: 1 cap(s) orally once a day (08 Jun 2020 11:26)  fenofibrate 145 mg oral tablet: 1 tab(s) orally once a day (08 Jun 2020 11:26)  glipiZIDE 10 mg oral tablet: 1 tab(s) orally 2 times a day (08 Jun 2020 11:26)  Lipitor 80 mg oral tablet: 1 tab(s) orally once a day (08 Jun 2020 11:26)  lisinopril 40 mg oral tablet: 1 tab(s) orally once a day (08 Jun 2020 11:26)  metFORMIN 1000 mg oral tablet: 1 tab(s) orally 2 times a day (08 Jun 2020 11:26)  Norvasc 10 mg oral tablet: 1 tab(s) orally once a day (08 Jun 2020 11:26)  oxybutynin 5 mg oral tablet: 1 tab(s) orally 2 times a day (08 Jun 2020 11:26)  Plavix 75 mg oral tablet: 1 tab(s) orally once a day (08 Jun 2020 11:26)  RisperDAL 1 mg oral tablet: 1 tab(s) orally once a day in AM (08 Jun 2020 11:26)  RisperDAL 3 mg oral tablet: 1 tab(s) orally once a day (at bedtime) (08 Jun 2020 11:26)  SEROquel 100 mg oral tablet: 1 tab(s) orally once a day (at bedtime) (08 Jun 2020 11:26)  Vitamin D3 50,000 intl units (1250 mcg) oral capsule: 1 cap(s) orally once a week (08 Jun 2020 11:26)      VITALS:   T(F): 97.6 (08-11 @ 07:29), Max: 100.4 (08-11 @ 07:00)  HR: 69 (08-11 @ 10:42) (69 - 137)  BP: 140/86 (08-11 @ 10:42) (106/59 - 239/139)  BP(mean): --  RR: 18 (08-11 @ 10:42) (17 - 30)  SpO2: 97% (08-11 @ 10:42) (86% - 100%)    I&O's Summary      REVIEW OF SYSTEMS:  CONSTITUTIONAL: No weakness, fevers or chills  EYES: No visual changes  ENT: No vertigo or throat pain   NECK: No pain or stiffness  RESPIRATORY: Shortness of breath as described in HPI  CARDIOVASCULAR: No chest pain or palpitations  GASTROINTESTINAL: No abdominal or epigastric pain. No nausea, vomiting, or hematemesis; No diarrhea or constipation. No melena or hematochezia.  GENITOURINARY: No dysuria, frequency or hematuria  NEUROLOGICAL: No numbness or weakness  SKIN: No itching, no rashes  MSK: No pain    PHYSICAL EXAM:  NEURO: patient is awake , alert and oriented; left sided weakness (residual)  GEN: Not in acute distress  NECK: no thyroid enlargement, no JVD  LUNGS: Bibasilar crackles heard  CARDIOVASCULAR: S1/S2 present, RRR   ABD: Soft, non-tender, non-distended  EXT: 1+ bilateral LE edema  SKIN: Intact    LABS:                        13.8   8.92  )-----------( 362      ( 11 Aug 2020 07:16 )             46.4     08-11    145  |  107  |  10  ----------------------------<  298<H>  4.3   |  23  |  1.1    Ca    9.6      11 Aug 2020 07:16  Mg     2.0     08-11    TPro  7.3  /  Alb  4.3  /  TBili  0.8  /  DBili  x   /  AST  21  /  ALT  11  /  AlkPhos  65  08-11      Troponin T, Serum: <0.01 ng/mL (08-11-20 @ 07:16)    CARDIAC MARKERS ( 11 Aug 2020 07:16 )  x     / <0.01 ng/mL / x     / x     / x          Troponin trend:    Serum Pro-Brain Natriuretic Peptide: 2867 pg/mL (08-11-20 @ 07:16)    RADIOLOGY:  -CXR:  < from: Xray Chest 1 View-PORTABLE IMMEDIATE (08.11.20 @ 08:22) >  Impression:    cardiomegaly with congestive changes, unchanged.    < end of copied text >  	  -TTE:  < from: Transthoracic Echocardiogram (02.19.20 @ 11:05) >  Summary:   1. Moderately decreased segmental left ventricular systolic function.   2. LV Ejection Fraction by Urena's Method with a biplane EF of 38 %.   3. Spectral Doppler shows pseudonormal pattern of left ventricular myocardial filling (Grade IIdiastolic dysfunction).   4. Mild to moderate mitral valve regurgitation.   5. The mitral valve leaflets are tethered which is due to reduced systolic function and elevated LVDP.   6. Mild-moderate tricuspid regurgitation.   7. Mild aortic regurgitation.   8. Sclerotic aortic valve with normal opening.   9. Estimated pulmonary artery systolic pressure is 59.7 mmHg assuming a right atrial pressure of 15 mmHg, which is consistent with moderate pulmonary hypertension.    < end of copied text >    -CCTA:  -STRESS TEST:  -CATHETERIZATION in 2016  Non-obstructive CAD: Distal LCx 60%, Prox and Mid RCA 20%    ECG: sinus tachycardia, LA enlargement, IVCD    TELEMETRY EVENTS:

## 2020-08-11 NOTE — ED PROVIDER NOTE - ATTENDING CONTRIBUTION TO CARE
55y F w/ hx of HFrEF 38%,COPD on 2L baseline at home, asthma BIBA for resp distress.  sx started 3 hrs pta. fairly rapid onset of sx. per ems, pt satting in 60s on home o2. pt endorses noncompliance w/ lasix.     vs hypertensive, hypoxemic, tachycardic  gen- moderate resp distress, aaox3  card-tachy/r/egular  lungs-severe increased WOB, crackles throughout, tachypnic throughout  abd-sntnd, no guarding or rebound  neuro- full str/sensation, cn ii-xii grossly intact, normal coordination    likely APE  basic labs, ekg/bnp/trop/cxr  bipap, lasix, nitro  further care pending response to therapy

## 2020-08-11 NOTE — ED ADULT NURSE REASSESSMENT NOTE - NS ED NURSE REASSESS COMMENT FT1
Patient assessed, Bipap maintained- patient sating 100% in no acute distress. Nitro drip continued, patient states she feels a lot better. No chest pain at this time. Cardiac monitoring maintained, safety precautions initiated. Will continue to monitor.

## 2020-08-12 LAB
A1C WITH ESTIMATED AVERAGE GLUCOSE RESULT: 6.2 % — HIGH (ref 4–5.6)
ALBUMIN SERPL ELPH-MCNC: 3.4 G/DL — LOW (ref 3.5–5.2)
ALP SERPL-CCNC: 42 U/L — SIGNIFICANT CHANGE UP (ref 30–115)
ALT FLD-CCNC: 7 U/L — SIGNIFICANT CHANGE UP (ref 0–41)
ANION GAP SERPL CALC-SCNC: 12 MMOL/L — SIGNIFICANT CHANGE UP (ref 7–14)
AST SERPL-CCNC: 12 U/L — SIGNIFICANT CHANGE UP (ref 0–41)
BASOPHILS # BLD AUTO: 0.09 K/UL — SIGNIFICANT CHANGE UP (ref 0–0.2)
BASOPHILS NFR BLD AUTO: 1.7 % — HIGH (ref 0–1)
BILIRUB SERPL-MCNC: 0.4 MG/DL — SIGNIFICANT CHANGE UP (ref 0.2–1.2)
BUN SERPL-MCNC: 17 MG/DL — SIGNIFICANT CHANGE UP (ref 10–20)
CALCIUM SERPL-MCNC: 9.4 MG/DL — SIGNIFICANT CHANGE UP (ref 8.5–10.1)
CHLORIDE SERPL-SCNC: 106 MMOL/L — SIGNIFICANT CHANGE UP (ref 98–110)
CO2 SERPL-SCNC: 26 MMOL/L — SIGNIFICANT CHANGE UP (ref 17–32)
CREAT SERPL-MCNC: 1 MG/DL — SIGNIFICANT CHANGE UP (ref 0.7–1.5)
EOSINOPHIL # BLD AUTO: 0.14 K/UL — SIGNIFICANT CHANGE UP (ref 0–0.7)
EOSINOPHIL NFR BLD AUTO: 2.6 % — SIGNIFICANT CHANGE UP (ref 0–8)
ESTIMATED AVERAGE GLUCOSE: 131 MG/DL — HIGH (ref 68–114)
GLUCOSE BLDC GLUCOMTR-MCNC: 134 MG/DL — HIGH (ref 70–99)
GLUCOSE BLDC GLUCOMTR-MCNC: 147 MG/DL — HIGH (ref 70–99)
GLUCOSE BLDC GLUCOMTR-MCNC: 81 MG/DL — SIGNIFICANT CHANGE UP (ref 70–99)
GLUCOSE BLDC GLUCOMTR-MCNC: 99 MG/DL — SIGNIFICANT CHANGE UP (ref 70–99)
GLUCOSE SERPL-MCNC: 97 MG/DL — SIGNIFICANT CHANGE UP (ref 70–99)
HCT VFR BLD CALC: 36.1 % — LOW (ref 37–47)
HGB BLD-MCNC: 11.1 G/DL — LOW (ref 12–16)
IMM GRANULOCYTES NFR BLD AUTO: 0.4 % — HIGH (ref 0.1–0.3)
LYMPHOCYTES # BLD AUTO: 2.18 K/UL — SIGNIFICANT CHANGE UP (ref 1.2–3.4)
LYMPHOCYTES # BLD AUTO: 40.1 % — SIGNIFICANT CHANGE UP (ref 20.5–51.1)
MCHC RBC-ENTMCNC: 27.3 PG — SIGNIFICANT CHANGE UP (ref 27–31)
MCHC RBC-ENTMCNC: 30.7 G/DL — LOW (ref 32–37)
MCV RBC AUTO: 88.7 FL — SIGNIFICANT CHANGE UP (ref 81–99)
MONOCYTES # BLD AUTO: 0.36 K/UL — SIGNIFICANT CHANGE UP (ref 0.1–0.6)
MONOCYTES NFR BLD AUTO: 6.6 % — SIGNIFICANT CHANGE UP (ref 1.7–9.3)
NEUTROPHILS # BLD AUTO: 2.64 K/UL — SIGNIFICANT CHANGE UP (ref 1.4–6.5)
NEUTROPHILS NFR BLD AUTO: 48.6 % — SIGNIFICANT CHANGE UP (ref 42.2–75.2)
NRBC # BLD: 0 /100 WBCS — SIGNIFICANT CHANGE UP (ref 0–0)
PLATELET # BLD AUTO: 229 K/UL — SIGNIFICANT CHANGE UP (ref 130–400)
POTASSIUM SERPL-MCNC: 4 MMOL/L — SIGNIFICANT CHANGE UP (ref 3.5–5)
POTASSIUM SERPL-SCNC: 4 MMOL/L — SIGNIFICANT CHANGE UP (ref 3.5–5)
PROT SERPL-MCNC: 5.6 G/DL — LOW (ref 6–8)
RBC # BLD: 4.07 M/UL — LOW (ref 4.2–5.4)
RBC # FLD: 17.7 % — HIGH (ref 11.5–14.5)
SARS-COV-2 RNA SPEC QL NAA+PROBE: SIGNIFICANT CHANGE UP
SODIUM SERPL-SCNC: 144 MMOL/L — SIGNIFICANT CHANGE UP (ref 135–146)
WBC # BLD: 5.43 K/UL — SIGNIFICANT CHANGE UP (ref 4.8–10.8)
WBC # FLD AUTO: 5.43 K/UL — SIGNIFICANT CHANGE UP (ref 4.8–10.8)

## 2020-08-12 PROCEDURE — 99233 SBSQ HOSP IP/OBS HIGH 50: CPT

## 2020-08-12 PROCEDURE — 93010 ELECTROCARDIOGRAM REPORT: CPT

## 2020-08-12 RX ORDER — FUROSEMIDE 40 MG
40 TABLET ORAL DAILY
Refills: 0 | Status: DISCONTINUED | OUTPATIENT
Start: 2020-08-13 | End: 2020-08-13

## 2020-08-12 RX ORDER — TAMSULOSIN HYDROCHLORIDE 0.4 MG/1
0.4 CAPSULE ORAL AT BEDTIME
Refills: 0 | Status: DISCONTINUED | OUTPATIENT
Start: 2020-08-12 | End: 2020-08-12

## 2020-08-12 RX ADMIN — LISINOPRIL 40 MILLIGRAM(S): 2.5 TABLET ORAL at 06:09

## 2020-08-12 RX ADMIN — SENNA PLUS 2 TABLET(S): 8.6 TABLET ORAL at 21:36

## 2020-08-12 RX ADMIN — BUDESONIDE AND FORMOTEROL FUMARATE DIHYDRATE 2 PUFF(S): 160; 4.5 AEROSOL RESPIRATORY (INHALATION) at 08:57

## 2020-08-12 RX ADMIN — Medication 200 MILLIGRAM(S): at 06:10

## 2020-08-12 RX ADMIN — Medication 10 MILLIGRAM(S): at 17:05

## 2020-08-12 RX ADMIN — Medication 5 MILLIGRAM(S): at 06:10

## 2020-08-12 RX ADMIN — Medication 40 MILLIGRAM(S): at 06:10

## 2020-08-12 RX ADMIN — Medication 40 MILLIGRAM(S): at 17:06

## 2020-08-12 RX ADMIN — Medication 145 MILLIGRAM(S): at 11:54

## 2020-08-12 RX ADMIN — Medication 5 UNIT(S): at 11:54

## 2020-08-12 RX ADMIN — Medication 10 MILLIGRAM(S): at 06:09

## 2020-08-12 RX ADMIN — Medication 81 MILLIGRAM(S): at 11:54

## 2020-08-12 RX ADMIN — Medication 5 UNIT(S): at 08:25

## 2020-08-12 RX ADMIN — RISPERIDONE 1 MILLIGRAM(S): 4 TABLET ORAL at 11:54

## 2020-08-12 RX ADMIN — ENOXAPARIN SODIUM 40 MILLIGRAM(S): 100 INJECTION SUBCUTANEOUS at 11:54

## 2020-08-12 RX ADMIN — INSULIN GLARGINE 15 UNIT(S): 100 INJECTION, SOLUTION SUBCUTANEOUS at 21:46

## 2020-08-12 RX ADMIN — CLOPIDOGREL BISULFATE 75 MILLIGRAM(S): 75 TABLET, FILM COATED ORAL at 11:54

## 2020-08-12 RX ADMIN — ATORVASTATIN CALCIUM 80 MILLIGRAM(S): 80 TABLET, FILM COATED ORAL at 21:38

## 2020-08-12 RX ADMIN — Medication 5 MILLIGRAM(S): at 17:05

## 2020-08-12 RX ADMIN — RISPERIDONE 3 MILLIGRAM(S): 4 TABLET ORAL at 21:39

## 2020-08-12 RX ADMIN — QUETIAPINE FUMARATE 100 MILLIGRAM(S): 200 TABLET, FILM COATED ORAL at 21:36

## 2020-08-12 RX ADMIN — POLYETHYLENE GLYCOL 3350 17 GRAM(S): 17 POWDER, FOR SOLUTION ORAL at 11:54

## 2020-08-12 NOTE — PROGRESS NOTE ADULT - ASSESSMENT
55 year old female with a pmhx of HFrEF 38%, HTN, HLD, DM COPD on 2L Home O2 presents of shortness of breath.     #Hypoxic resp failure 2/2 acute on chronic HFrEF w/ diast dysfunc - improved  - Secondary to medication non compliance patient doesn't take her lasix daily because it "makes her go to the bathroom a lot"; last dose 3 days ago)  - CXR showed vascular congestion with cardiomegaly  - BNP 2867  - Symptoms significantly improved on BPAP and Nitro drip; now on O2 nasal canula (4L/min); at baseline, patient is on 3L/min; currently saturating 97% on 2L  - c/w lasix   - Admit to telemetry  - ECG showed no new changes  - Daily weight, strict I/Os, fluid restriction  - c/w oxygen supplementation and titrate as tolerated. Patient is on 2 L o2 at baseline  - TTE in 2/2020 EF 38 with G2DD and moderate PHTN, mod MV regurg; mild AR; mod pulm HTN  - troponin first set negative, f/u 2nd set  - Low salt diet  - Patient education on medication compliance    # Hypertensive emergency on presentation   - /139 on admission  - improved s/p lasix and nitro infusion  - Last /87  - c/w lasix, norvasc,  lisinopril and metoprolol    # DM w/ hyperglycemia  - basal and nutritional insulin with sliding scale  - f/u BS and adjust if needed    # HLD  - c/w statin    # COPDon home O2) - not in acute exacerbation  - not in exacerbation  - symbicort and duoneb    #Possibly has severe pulm HTN (possibly group 2)  - Moderate as per ECHO but low EF so probable underestimation     #Urinary symp's - suspect that this is assoc w/ hx of overactive bladder    # DVT ppx: lovenox# PPI ppx: no need  # DIET: DASH carb consistent  # ACTIVITY: IAT  # DISPO: From home 55 year old female with a pmhx of HFrEF 38%, HTN, HLD, DM COPD on 2L Home O2 presents of shortness of breath.     #Hypoxic resp failure 2/2 acute on chronic HFrEF w/ diast dysfunc - improved  - Secondary to medication non compliance patient doesn't take her lasix daily because it "makes her go to the bathroom a lot"; last dose 3 days ago)  - CXR showed vascular congestion with cardiomegaly  - BNP 2867  - Symptoms significantly improved on BPAP and Nitro drip; now on O2 nasal canula (4L/min); at baseline, patient is on 3L/min; currently saturating 97% on 2L  - c/w lasix   - Admit to telemetry  - ECG showed no new changes  - Daily weight, strict I/Os, fluid restriction  - c/w oxygen supplementation and titrate as tolerated. Patient is on 2 L o2 at baseline  - TTE in 2/2020 EF 38 with G2DD and moderate PHTN, mod MV regurg; mild AR; mod pulm HTN  - troponin first set : <0.01>>0.02  - Low salt diet  - Patient education on medication compliance    # Hypertensive emergency on presentation   - /139 on admission  - improved s/p lasix and nitro infusion  - Last /87  - c/w lasix, norvasc,  lisinopril and metoprolol    # DM 2 w/ hyperglycemia  - basal and nutritional insulin with sliding scale  - f/u BS and adjust if needed    # HLD  - c/w statin    # COPD (on home O2)   - not in exacerbation  - symbicort and duoneb    #Possibly has severe pulm HTN (possibly group 2)  - Moderate as per ECHO but low EF so probable underestimation     #Urinary symp's - suspect that this is assoc w/ hx of overactive bladder    # DVT ppx: lovenox# PPI ppx: no need  # DIET: DASH carb consistent  # ACTIVITY: IAT  # DISPO: From home 55 year old female with a pmhx of HFrEF 38%, HTN, HLD, DM COPD on 2L Home O2 presents of shortness of breath.     #Hypoxic resp failure 2/2 acute on chronic HFrEF w/ diast dysfunc - improved  - Secondary to medication non compliance patient doesn't take her lasix daily because it "makes her go to the bathroom a lot"; last dose 3 days ago)  - CXR showed vascular congestion with cardiomegaly  - BNP 2867  - Symptoms significantly improved on BPAP and Nitro drip; now on O2 nasal canula (4L/min); at baseline, patient is on 3L/min; currently saturating 97% on 2L  - c/w lasix   - Admit to telemetry  - ECG showed no new changes  - Daily weight, strict I/Os, fluid restriction  - c/w oxygen supplementation and titrate as tolerated. Patient is on 2 L o2 at baseline  - TTE in 2/2020 EF 38 with G2DD and moderate PHTN, mod MV regurg; mild AR; mod pulm HTN  - troponin first set : <0.01>>0.02  - Low salt diet  - Patient education on medication compliance    # Hypertensive emergency on presentation - improved  - /139 on admission  - improved s/p lasix and nitro infusion  - Last BP :112/72  - c/w lasix, norvasc,  lisinopril and metoprolol    # DM 2  - basal and nutritional insulin with sliding scale  - f/u BS and adjust if needed  - A1c: 6.2    # HLD  - c/w statin    # COPD (on home O2)   - not in exacerbation  - symbicort and duoneb    #Possibly has severe pulm HTN (possibly group 2)  - Moderate as per ECHO but low EF so probable underestimation     #Urinary symp's   - suspect that this is assoc w/ hx of overactive bladder    # DVT ppx: lovenox  # PPI ppx: no need  # DIET: DASH carb consistent  # ACTIVITY: IAT  # DISPO: From home 55 year old female with a pmhx of HFrEF 38%, HTN, HLD, DM COPD on 2L Home O2 presents of shortness of breath.     #Hypoxic resp failure 2/2 acute on chronic HFrEF w/ diast dysfunc - improved  - Secondary to medication non compliance patient doesn't take her lasix daily because it "makes her go to the bathroom a lot"; last dose 3 days ago)  - CXR showed vascular congestion with cardiomegaly  - BNP 2867  - Symptoms significantly improved on BPAP and Nitro drip; now on O2 nasal canula (4L/min); at baseline, patient is on 3L/min; currently saturating 97% on 2L  - c/w lasix intravenous, am cxr  - Admit to telemetry  - ECG showed no new changes  - Daily weight, strict I/Os, fluid restriction  - c/w oxygen supplementation and titrate as tolerated. Patient is on 2 L o2 at baseline  - TTE in 2/2020 EF 38 with G2DD and moderate PHTN, mod MV regurg; mild AR; mod pulm HTN  - troponin first set : <0.01>>0.02  - Low salt diet  - Patient education on medication compliance    # Hypertensive emergency on presentation - improved  - /139 on admission  - improved s/p lasix and nitro infusion  - Last BP :112/72  - c/w lasix, norvasc,  lisinopril and metoprolol    # DM 2  - basal and nutritional insulin with sliding scale  - f/u BS and adjust if needed  - A1c: 6.2    # HLD  - c/w statin    # COPD (on home O2)   - not in exacerbation  - symbicort and duoneb    #Possibly has severe pulm HTN (possibly group 2)  - Moderate as per ECHO but low EF so probable underestimation     #Urinary symp's   - suspect that this is assoc w/ hx of overactive bladder  - outpt urology     # DVT ppx: lovenox  # PPI ppx: no need  # DIET: DASH carb consistent  # ACTIVITY: IAT  # DISPO: From home

## 2020-08-12 NOTE — PROGRESS NOTE ADULT - ATTENDING COMMENTS
#Progress Note Handoff  Pending (specify):  CHF improvement   Family discussion: natalie pt and agreed to plan   Disposition: Home_x__/SNF___/Other___/Unknown at this time___  Pt seen during COVID 19 Pandemic.

## 2020-08-12 NOTE — PROGRESS NOTE ADULT - SUBJECTIVE AND OBJECTIVE BOX
WILLIAN MARY 55y Female  MRN#: [id number]   CODE STATUS: FULL      SUBJECTIVE  Patient is a 55y old Female who presents with a chief complaint of shortness of breath (11 Aug 2020 11:17)    Currently admitted to medicine with the primary diagnosis of acute exacerbation of CHF secdondary to medication non compliance    Today is hospital day 1d,   INTERVAL HPI/OVERNIGHT EVENTS: none.     This morning she is laying in bed comfortably . Currently saturating 97%on 2L O2.   Denies chest pain, shortness of breath, abdominal pain, nausea, vomiting or changes in bowel habits.  She mentions that if she uses lasix , she urinates a lot but otherwise she might have urge incontinence.    Present Today:           Wilkerson Catheter (x)No/ ()Yes?   Indication:             Central Line (x)No/ ()Yes?   Indication:          IV Fluids (x)No/ ()Yes? Type:  Rate:  Indication:    OBJECTIVE  PAST MEDICAL & SURGICAL HISTORY  CKD (chronic kidney disease), stage II  Type 2 diabetes mellitus  Cerebrovascular accident (CVA): Multiple  CHF (congestive heart failure)  COPD, severe  Anxiety and depression  Hyperlipidemia  Hypertension  No significant past surgical history    ALLERGIES:  No Known Allergies    HOME MEDICATIONS:  Home Medications:  aspirin 81 mg oral tablet: 1 tab(s) orally once a day (12 Aug 2020 05:23)  bisacodyl 10 mg rectal suppository: 1 suppository(ies) rectal once a day, As needed, Constipation (12 Aug 2020 05:24)  BuSpar 10 mg oral tablet: 3 tab(s) orally 2 times a day (12 Aug 2020 05:24)  doxepin 50 mg oral capsule: 1 cap(s) orally once a day (12 Aug 2020 05:24)  fenofibrate 145 mg oral tablet: 1 tab(s) orally once a day (12 Aug 2020 05:24)  glipiZIDE 10 mg oral tablet: 1 tab(s) orally 2 times a day (12 Aug 2020 05:24)  Lipitor 80 mg oral tablet: 1 tab(s) orally once a day (12 Aug 2020 05:23)  lisinopril 40 mg oral tablet: 1 tab(s) orally once a day (12 Aug 2020 05:24)  metFORMIN 1000 mg oral tablet: 1 tab(s) orally 2 times a day (12 Aug 2020 05:24)  Norvasc 10 mg oral tablet: 1 tab(s) orally once a day (08 Jun 2020 11:26)  oxybutynin 5 mg oral tablet: 1 tab(s) orally 2 times a day (12 Aug 2020 05:24)  Plavix 75 mg oral tablet: 1 tab(s) orally once a day (12 Aug 2020 05:24)  RisperDAL 1 mg oral tablet: 1 tab(s) orally once a day in AM (12 Aug 2020 05:24)  RisperDAL 3 mg oral tablet: 1 tab(s) orally once a day (at bedtime) (12 Aug 2020 05:24)  SEROquel 100 mg oral tablet: 1 tab(s) orally once a day (at bedtime) (12 Aug 2020 05:24)  Vitamin D3 50,000 intl units (1250 mcg) oral capsule: 1 cap(s) orally once a week (12 Aug 2020 05:24)    MEDICATIONS:  STANDING MEDICATIONS  amLODIPine   Tablet 10 milliGRAM(s) Oral daily  aspirin  chewable 81 milliGRAM(s) Oral daily  atorvastatin 80 milliGRAM(s) Oral at bedtime  budesonide 160 MICROgram(s)/formoterol 4.5 MICROgram(s) Inhaler 2 Puff(s) Inhalation two times a day  busPIRone 10 milliGRAM(s) Oral two times a day  clopidogrel Tablet 75 milliGRAM(s) Oral daily  dextrose 5%. 1000 milliLiter(s) (50 mL/Hr) IV Continuous <Continuous>  dextrose 50% Injectable 12.5 Gram(s) IV Push once  dextrose 50% Injectable 25 Gram(s) IV Push once  dextrose 50% Injectable 25 Gram(s) IV Push once  enoxaparin Injectable 40 milliGRAM(s) SubCutaneous daily  fenofibrate Tablet 145 milliGRAM(s) Oral daily  furosemide    Tablet 40 milliGRAM(s) Oral two times a day  insulin glargine Injectable (LANTUS) 15 Unit(s) SubCutaneous at bedtime  insulin lispro (HumaLOG) corrective regimen sliding scale   SubCutaneous three times a day before meals  insulin lispro Injectable (HumaLOG) 5 Unit(s) SubCutaneous three times a day before meals  lisinopril 40 milliGRAM(s) Oral daily  metoprolol succinate  milliGRAM(s) Oral daily  oxybutynin 5 milliGRAM(s) Oral two times a day  polyethylene glycol 3350 Oral Powder - Peds 17 Gram(s) Oral daily  QUEtiapine 100 milliGRAM(s) Oral at bedtime  risperiDONE   Tablet 1 milliGRAM(s) Oral daily  risperiDONE   Tablet 3 milliGRAM(s) Oral at bedtime  senna 2 Tablet(s) Oral at bedtime    PRN MEDICATIONS  albuterol/ipratropium for Nebulization 3 milliLiter(s) Nebulizer every 6 hours PRN  bisacodyl Suppository 10 milliGRAM(s) Rectal daily PRN  dextrose 40% Gel 15 Gram(s) Oral once PRN  glucagon  Injectable 1 milliGRAM(s) IntraMuscular once PRN  nitroglycerin     SubLingual 0.4 milliGRAM(s) SubLingual every 5 minutes PRN      VITAL SIGNS: Last 24 Hours  T(C): 35.9 (12 Aug 2020 08:10), Max: 37 (11 Aug 2020 16:00)  T(F): 96.7 (12 Aug 2020 08:10), Max: 98.6 (11 Aug 2020 16:00)  HR: 68 (12 Aug 2020 08:10) (60 - 81)  BP: 119/85 (12 Aug 2020 08:10) (108/65 - 152/80)  RR: 20 (12 Aug 2020 08:10) (17 - 20)  SpO2: 96% (11 Aug 2020 22:24) (95% - 97%)    LABS:                        11.1   5.43  )-----------( 229      ( 12 Aug 2020 05:52 )             36.1     08-12    144  |  106  |  17  ----------------------------<  97  4.0   |  26  |  1.0    Ca    9.4      12 Aug 2020 05:52  Mg     2.0     08-11    TPro  5.6<L>  /  Alb  3.4<L>  /  TBili  0.4  /  DBili  x   /  AST  12  /  ALT  7   /  AlkPhos  42  08-12    Troponin T, Serum: 0.02 ng/mL <H> (08-11-20 @ 16:44)    CARDIAC MARKERS ( 11 Aug 2020 16:44 )  x     / 0.02 ng/mL / x     / x     / x      CARDIAC MARKERS ( 11 Aug 2020 07:16 )  x     / <0.01 ng/mL / x     / x     / x            RADIOLOGY:   Xray Chest 1 View-PORTABLE IMMEDIATE (08.11.20 @ 08:22) >  cardiomegaly with congestive changes, unchanged.    ECHO:  < from: Transthoracic Echocardiogram (02.19.20 @ 11:05) >   1. Moderately decreased segmental left ventricular systolic function.   2. LV Ejection Fraction by Urena's Method with a biplane EF of 38 %.   3. Spectral Doppler shows pseudonormal pattern of left ventricular myocardial filling (Grade IIdiastolic dysfunction).   4. Mild to moderate mitral valve regurgitation.   5. The mitral valve leaflets are tethered which is due to reduced systolic function and elevated LVDP.   6. Mild-moderate tricuspid regurgitation.   7. Mild aortic regurgitation.   8. Sclerotic aortic valve with normal opening.   9. Estimated pulmonary artery systolic pressure is 59.7 mmHg assuming a right atrial pressure of 15 mmHg, which is consistent with moderate pulmonary hypertension.      PHYSICAL EXAM:    GENERAL: NAD, well-developed, AAOx3  HEENT:  Atraumatic, Normocephalic. EOMI, PERRLA, conjunctiva and sclera clear, No JVD  PULMONARY: mild crackles at bases posteriorly+ ; No wheeze  CARDIOVASCULAR: Regular rate and rhythm; No murmurs, rubs, or gallops  GASTROINTESTINAL: Soft, Nontender, Nondistended; Bowel sounds present  MUSCULOSKELETAL:  2+ Peripheral Pulses, No clubbing, cyanosis, or edema  NEUROLOGY: non-focal  SKIN: No rashes or lesions WILLIAN MARY 55y Female  MRN#: [id number]   CODE STATUS: FULL      SUBJECTIVE  Patient is a 55y old Female who presents with a chief complaint of shortness of breath (11 Aug 2020 11:17)    Currently admitted to medicine with the primary diagnosis of acute exacerbation of CHF secdondary to medication non compliance    Today is hospital day 1d,   INTERVAL HPI/OVERNIGHT EVENTS: none.     This morning she is laying in bed comfortably . Currently saturating 97%on 2L O2.   Denies chest pain, shortness of breath, abdominal pain, nausea, vomiting or changes in bowel habits.  She mentions that if she uses lasix , she urinates a lot but otherwise she might have urge incontinence.    Attending Note: Pt seen and examined at bedside. No cp and sob improving. Pt states that she did not take her lasix as ot causes her to use the bathroom more often.  She did say that she has been urinating a little if she does not use the lasix.     Present Today:           Wilkerson Catheter (x)No/ ()Yes?   Indication:             Central Line (x)No/ ()Yes?   Indication:          IV Fluids (x)No/ ()Yes? Type:  Rate:  Indication:    OBJECTIVE  PAST MEDICAL & SURGICAL HISTORY  CKD (chronic kidney disease), stage II  Type 2 diabetes mellitus  Cerebrovascular accident (CVA): Multiple  CHF (congestive heart failure)  COPD, severe  Anxiety and depression  Hyperlipidemia  Hypertension  No significant past surgical history    ALLERGIES:  No Known Allergies    HOME MEDICATIONS:  Home Medications:  aspirin 81 mg oral tablet: 1 tab(s) orally once a day (12 Aug 2020 05:23)  bisacodyl 10 mg rectal suppository: 1 suppository(ies) rectal once a day, As needed, Constipation (12 Aug 2020 05:24)  BuSpar 10 mg oral tablet: 3 tab(s) orally 2 times a day (12 Aug 2020 05:24)  doxepin 50 mg oral capsule: 1 cap(s) orally once a day (12 Aug 2020 05:24)  fenofibrate 145 mg oral tablet: 1 tab(s) orally once a day (12 Aug 2020 05:24)  glipiZIDE 10 mg oral tablet: 1 tab(s) orally 2 times a day (12 Aug 2020 05:24)  Lipitor 80 mg oral tablet: 1 tab(s) orally once a day (12 Aug 2020 05:23)  lisinopril 40 mg oral tablet: 1 tab(s) orally once a day (12 Aug 2020 05:24)  metFORMIN 1000 mg oral tablet: 1 tab(s) orally 2 times a day (12 Aug 2020 05:24)  Norvasc 10 mg oral tablet: 1 tab(s) orally once a day (08 Jun 2020 11:26)  oxybutynin 5 mg oral tablet: 1 tab(s) orally 2 times a day (12 Aug 2020 05:24)  Plavix 75 mg oral tablet: 1 tab(s) orally once a day (12 Aug 2020 05:24)  RisperDAL 1 mg oral tablet: 1 tab(s) orally once a day in AM (12 Aug 2020 05:24)  RisperDAL 3 mg oral tablet: 1 tab(s) orally once a day (at bedtime) (12 Aug 2020 05:24)  SEROquel 100 mg oral tablet: 1 tab(s) orally once a day (at bedtime) (12 Aug 2020 05:24)  Vitamin D3 50,000 intl units (1250 mcg) oral capsule: 1 cap(s) orally once a week (12 Aug 2020 05:24)    MEDICATIONS:  STANDING MEDICATIONS  amLODIPine   Tablet 10 milliGRAM(s) Oral daily  aspirin  chewable 81 milliGRAM(s) Oral daily  atorvastatin 80 milliGRAM(s) Oral at bedtime  budesonide 160 MICROgram(s)/formoterol 4.5 MICROgram(s) Inhaler 2 Puff(s) Inhalation two times a day  busPIRone 10 milliGRAM(s) Oral two times a day  clopidogrel Tablet 75 milliGRAM(s) Oral daily  dextrose 5%. 1000 milliLiter(s) (50 mL/Hr) IV Continuous <Continuous>  dextrose 50% Injectable 12.5 Gram(s) IV Push once  dextrose 50% Injectable 25 Gram(s) IV Push once  dextrose 50% Injectable 25 Gram(s) IV Push once  enoxaparin Injectable 40 milliGRAM(s) SubCutaneous daily  fenofibrate Tablet 145 milliGRAM(s) Oral daily  furosemide    Tablet 40 milliGRAM(s) Oral two times a day  insulin glargine Injectable (LANTUS) 15 Unit(s) SubCutaneous at bedtime  insulin lispro (HumaLOG) corrective regimen sliding scale   SubCutaneous three times a day before meals  insulin lispro Injectable (HumaLOG) 5 Unit(s) SubCutaneous three times a day before meals  lisinopril 40 milliGRAM(s) Oral daily  metoprolol succinate  milliGRAM(s) Oral daily  oxybutynin 5 milliGRAM(s) Oral two times a day  polyethylene glycol 3350 Oral Powder - Peds 17 Gram(s) Oral daily  QUEtiapine 100 milliGRAM(s) Oral at bedtime  risperiDONE   Tablet 1 milliGRAM(s) Oral daily  risperiDONE   Tablet 3 milliGRAM(s) Oral at bedtime  senna 2 Tablet(s) Oral at bedtime    PRN MEDICATIONS  albuterol/ipratropium for Nebulization 3 milliLiter(s) Nebulizer every 6 hours PRN  bisacodyl Suppository 10 milliGRAM(s) Rectal daily PRN  dextrose 40% Gel 15 Gram(s) Oral once PRN  glucagon  Injectable 1 milliGRAM(s) IntraMuscular once PRN  nitroglycerin     SubLingual 0.4 milliGRAM(s) SubLingual every 5 minutes PRN      VITAL SIGNS: Last 24 Hours  T(C): 35.9 (12 Aug 2020 08:10), Max: 37 (11 Aug 2020 16:00)  T(F): 96.7 (12 Aug 2020 08:10), Max: 98.6 (11 Aug 2020 16:00)  HR: 68 (12 Aug 2020 08:10) (60 - 81)  BP: 119/85 (12 Aug 2020 08:10) (108/65 - 152/80)  RR: 20 (12 Aug 2020 08:10) (17 - 20)  SpO2: 96% (11 Aug 2020 22:24) (95% - 97%)    LABS:                        11.1   5.43  )-----------( 229      ( 12 Aug 2020 05:52 )             36.1     08-12    144  |  106  |  17  ----------------------------<  97  4.0   |  26  |  1.0    Ca    9.4      12 Aug 2020 05:52  Mg     2.0     08-11    TPro  5.6<L>  /  Alb  3.4<L>  /  TBili  0.4  /  DBili  x   /  AST  12  /  ALT  7   /  AlkPhos  42  08-12    Troponin T, Serum: 0.02 ng/mL <H> (08-11-20 @ 16:44)    CARDIAC MARKERS ( 11 Aug 2020 16:44 )  x     / 0.02 ng/mL / x     / x     / x      CARDIAC MARKERS ( 11 Aug 2020 07:16 )  x     / <0.01 ng/mL / x     / x     / x            RADIOLOGY:   Xray Chest 1 View-PORTABLE IMMEDIATE (08.11.20 @ 08:22) >  cardiomegaly with congestive changes, unchanged.    ECHO:  < from: Transthoracic Echocardiogram (02.19.20 @ 11:05) >   1. Moderately decreased segmental left ventricular systolic function.   2. LV Ejection Fraction by Urena's Method with a biplane EF of 38 %.   3. Spectral Doppler shows pseudonormal pattern of left ventricular myocardial filling (Grade IIdiastolic dysfunction).   4. Mild to moderate mitral valve regurgitation.   5. The mitral valve leaflets are tethered which is due to reduced systolic function and elevated LVDP.   6. Mild-moderate tricuspid regurgitation.   7. Mild aortic regurgitation.   8. Sclerotic aortic valve with normal opening.   9. Estimated pulmonary artery systolic pressure is 59.7 mmHg assuming a right atrial pressure of 15 mmHg, which is consistent with moderate pulmonary hypertension.      PHYSICAL EXAM:    GENERAL: NAD, well-developed, AAOx3  HEENT:  Atraumatic, Normocephalic. EOMI, PERRLA, conjunctiva and sclera clear, No JVD  PULMONARY: mild crackles at bases posteriorly+ ; No wheeze  CARDIOVASCULAR: Regular rate and rhythm; No murmurs, rubs, or gallops  GASTROINTESTINAL: Soft, Nontender, Nondistended; Bowel sounds present  MUSCULOSKELETAL:  2+ Peripheral Pulses, No clubbing, cyanosis, or edema  NEUROLOGY: non-focal  SKIN: No rashes or lesions

## 2020-08-13 ENCOUNTER — TRANSCRIPTION ENCOUNTER (OUTPATIENT)
Age: 55
End: 2020-08-13

## 2020-08-13 VITALS
SYSTOLIC BLOOD PRESSURE: 119 MMHG | RESPIRATION RATE: 18 BRPM | DIASTOLIC BLOOD PRESSURE: 72 MMHG | TEMPERATURE: 98 F | HEART RATE: 64 BPM

## 2020-08-13 LAB
ANION GAP SERPL CALC-SCNC: 11 MMOL/L — SIGNIFICANT CHANGE UP (ref 7–14)
BASOPHILS # BLD AUTO: 0.08 K/UL — SIGNIFICANT CHANGE UP (ref 0–0.2)
BASOPHILS NFR BLD AUTO: 1.5 % — HIGH (ref 0–1)
BUN SERPL-MCNC: 28 MG/DL — HIGH (ref 10–20)
CALCIUM SERPL-MCNC: 9.3 MG/DL — SIGNIFICANT CHANGE UP (ref 8.5–10.1)
CHLORIDE SERPL-SCNC: 105 MMOL/L — SIGNIFICANT CHANGE UP (ref 98–110)
CO2 SERPL-SCNC: 26 MMOL/L — SIGNIFICANT CHANGE UP (ref 17–32)
CREAT SERPL-MCNC: 1.4 MG/DL — SIGNIFICANT CHANGE UP (ref 0.7–1.5)
EOSINOPHIL # BLD AUTO: 0.14 K/UL — SIGNIFICANT CHANGE UP (ref 0–0.7)
EOSINOPHIL NFR BLD AUTO: 2.6 % — SIGNIFICANT CHANGE UP (ref 0–8)
GLUCOSE BLDC GLUCOMTR-MCNC: 122 MG/DL — HIGH (ref 70–99)
GLUCOSE BLDC GLUCOMTR-MCNC: 122 MG/DL — HIGH (ref 70–99)
GLUCOSE BLDC GLUCOMTR-MCNC: 92 MG/DL — SIGNIFICANT CHANGE UP (ref 70–99)
GLUCOSE SERPL-MCNC: 137 MG/DL — HIGH (ref 70–99)
HCT VFR BLD CALC: 34.8 % — LOW (ref 37–47)
HGB BLD-MCNC: 10.8 G/DL — LOW (ref 12–16)
IMM GRANULOCYTES NFR BLD AUTO: 0.2 % — SIGNIFICANT CHANGE UP (ref 0.1–0.3)
LYMPHOCYTES # BLD AUTO: 1.96 K/UL — SIGNIFICANT CHANGE UP (ref 1.2–3.4)
LYMPHOCYTES # BLD AUTO: 35.9 % — SIGNIFICANT CHANGE UP (ref 20.5–51.1)
MAGNESIUM SERPL-MCNC: 1.8 MG/DL — SIGNIFICANT CHANGE UP (ref 1.8–2.4)
MCHC RBC-ENTMCNC: 28.1 PG — SIGNIFICANT CHANGE UP (ref 27–31)
MCHC RBC-ENTMCNC: 31 G/DL — LOW (ref 32–37)
MCV RBC AUTO: 90.4 FL — SIGNIFICANT CHANGE UP (ref 81–99)
MONOCYTES # BLD AUTO: 0.38 K/UL — SIGNIFICANT CHANGE UP (ref 0.1–0.6)
MONOCYTES NFR BLD AUTO: 7 % — SIGNIFICANT CHANGE UP (ref 1.7–9.3)
NEUTROPHILS # BLD AUTO: 2.89 K/UL — SIGNIFICANT CHANGE UP (ref 1.4–6.5)
NEUTROPHILS NFR BLD AUTO: 52.8 % — SIGNIFICANT CHANGE UP (ref 42.2–75.2)
NRBC # BLD: 0 /100 WBCS — SIGNIFICANT CHANGE UP (ref 0–0)
PLATELET # BLD AUTO: 229 K/UL — SIGNIFICANT CHANGE UP (ref 130–400)
POTASSIUM SERPL-MCNC: 4.2 MMOL/L — SIGNIFICANT CHANGE UP (ref 3.5–5)
POTASSIUM SERPL-SCNC: 4.2 MMOL/L — SIGNIFICANT CHANGE UP (ref 3.5–5)
RBC # BLD: 3.85 M/UL — LOW (ref 4.2–5.4)
RBC # FLD: 17.6 % — HIGH (ref 11.5–14.5)
SODIUM SERPL-SCNC: 142 MMOL/L — SIGNIFICANT CHANGE UP (ref 135–146)
TROPONIN T SERPL-MCNC: <0.01 NG/ML — SIGNIFICANT CHANGE UP
WBC # BLD: 5.46 K/UL — SIGNIFICANT CHANGE UP (ref 4.8–10.8)
WBC # FLD AUTO: 5.46 K/UL — SIGNIFICANT CHANGE UP (ref 4.8–10.8)

## 2020-08-13 PROCEDURE — 99239 HOSP IP/OBS DSCHRG MGMT >30: CPT

## 2020-08-13 PROCEDURE — 71045 X-RAY EXAM CHEST 1 VIEW: CPT | Mod: 26

## 2020-08-13 RX ORDER — FUROSEMIDE 40 MG
40 TABLET ORAL
Refills: 0 | Status: DISCONTINUED | OUTPATIENT
Start: 2020-08-13 | End: 2020-08-13

## 2020-08-13 RX ADMIN — Medication 5 UNIT(S): at 08:30

## 2020-08-13 RX ADMIN — Medication 5 MILLIGRAM(S): at 17:11

## 2020-08-13 RX ADMIN — BUDESONIDE AND FORMOTEROL FUMARATE DIHYDRATE 2 PUFF(S): 160; 4.5 AEROSOL RESPIRATORY (INHALATION) at 05:47

## 2020-08-13 RX ADMIN — LISINOPRIL 40 MILLIGRAM(S): 2.5 TABLET ORAL at 05:46

## 2020-08-13 RX ADMIN — Medication 10 MILLIGRAM(S): at 05:46

## 2020-08-13 RX ADMIN — Medication 200 MILLIGRAM(S): at 05:46

## 2020-08-13 RX ADMIN — Medication 40 MILLIGRAM(S): at 17:11

## 2020-08-13 RX ADMIN — Medication 5 UNIT(S): at 12:45

## 2020-08-13 RX ADMIN — POLYETHYLENE GLYCOL 3350 17 GRAM(S): 17 POWDER, FOR SOLUTION ORAL at 12:45

## 2020-08-13 RX ADMIN — AMLODIPINE BESYLATE 10 MILLIGRAM(S): 2.5 TABLET ORAL at 05:46

## 2020-08-13 RX ADMIN — Medication 81 MILLIGRAM(S): at 12:45

## 2020-08-13 RX ADMIN — Medication 40 MILLIGRAM(S): at 05:46

## 2020-08-13 RX ADMIN — ENOXAPARIN SODIUM 40 MILLIGRAM(S): 100 INJECTION SUBCUTANEOUS at 12:46

## 2020-08-13 RX ADMIN — Medication 10 MILLIGRAM(S): at 17:11

## 2020-08-13 RX ADMIN — CLOPIDOGREL BISULFATE 75 MILLIGRAM(S): 75 TABLET, FILM COATED ORAL at 12:44

## 2020-08-13 RX ADMIN — BUDESONIDE AND FORMOTEROL FUMARATE DIHYDRATE 2 PUFF(S): 160; 4.5 AEROSOL RESPIRATORY (INHALATION) at 12:46

## 2020-08-13 RX ADMIN — Medication 145 MILLIGRAM(S): at 12:46

## 2020-08-13 RX ADMIN — Medication 5 MILLIGRAM(S): at 05:46

## 2020-08-13 RX ADMIN — RISPERIDONE 1 MILLIGRAM(S): 4 TABLET ORAL at 12:45

## 2020-08-13 NOTE — DISCHARGE NOTE NURSING/CASE MANAGEMENT/SOCIAL WORK - PATIENT PORTAL LINK FT
You can access the FollowMyHealth Patient Portal offered by [facility] by registering at the following website: [email or web address]. By joining [facility]’s FollowMyHealth portal, you will also be able to view your health information using other applications (apps) compatible with our system.

## 2020-08-13 NOTE — DISCHARGE NOTE PROVIDER - CARE PROVIDER_API CALL
Aixa Charles  INTERNAL MEDICINE  [address] [place] [address]  Phone: [phone]  Fax: [phone]  Follow Up Time: 1 week    Carmina Dueñas  Urology  [address] [place] [address] [address]  [place] [address]  Phone: [phone]  Fax: [phone]  Follow Up Time: 2 weeks

## 2020-08-13 NOTE — DISCHARGE NOTE PROVIDER - NSDCFUADDINST_GEN_ALL_CORE_FT
Please follow up with your regular doctor for further care and health maintenance.  Please call to make an appointment with a urologist. We included the number for a Dr. Dueñas. Please follow up with your regular doctor for further care and health maintenance.  Please call to make an appointment with a urologist. We included the number for a Dr. Dueñas.  Please take all your medication as prescribed. If you are having any issues please call your doctor.

## 2020-08-13 NOTE — DISCHARGE NOTE PROVIDER - HOSPITAL COURSE
History of Present Illness:     55 year old female with a pmhx of HFrEF 38%, HTN, HLD, DM COPD on 2L Home O2 presents of shortness of breath. Symptoms started 3 hours prior to presentation with gradual onset of shortness of breath and desaturation. She was brought in by EMT who mentioned that her saturation was in 60s on her baseline home oxygen and in the 80s on non rebreather face mask. She mentioned that she stopped taking her dose of lasix 3 days ago. She denied other associated symptoms including chest pain, cough, fevers, chills, increase in sputum production, nausea or vomiting or other associated symptoms.        In the ED:    VS /139  Spo2 86 on non rebreather face mask.    CXR showed vascular congestion. She was put on bipap and received lasix iv and was started on nitroglycerin infusion.        Hospital Course:    #Hypoxic resp failure 2/2 acute on chronic HFrEF w/ diast dysfunc - improved    - Secondary to medication non compliance patient doesn't take her lasix daily because it "makes her go to the bathroom a lot"; last dose 3 days ago). CXR showed vascular congestion with cardiomegaly. BNP 2867. Symptoms significantly improved on BPAP and Nitro drip; now on O2 nasal canula (2L/min); is on 02 at home                     - c/w lasix intravenous, am cxr    - Admit to telemetry    - ECG showed no new changes    - Daily weight, strict I/Os, fluid restriction    - c/w oxygen supplementation and titrate as tolerated. Patient is on 2 L o2 at baseline    - TTE in 2/2020 EF 38 with G2DD and moderate PHTN, mod MV regurg; mild AR; mod pulm HTN    - troponin first set : <0.01>>0.02    - Low salt diet    - Patient education on medication compliance        # Hypertensive emergency on presentation - improved    - /139 on admission    - improved s/p lasix and nitro infusion    - Last BP :112/72    - c/w lasix, norvasc,  lisinopril and metoprolol History of Present Illness:     55 year old female with a pmhx of HFrEF 38%, HTN, HLD, DM COPD on 2L Home O2 presents of shortness of breath. Symptoms started 3 hours prior to presentation with gradual onset of shortness of breath and desaturation. She was brought in by EMT who mentioned that her saturation was in 60s on her baseline home oxygen and in the 80s on non rebreather face mask. She mentioned that she stopped taking her dose of lasix 3 days ago. She denied other associated symptoms including chest pain, cough, fevers, chills, increase in sputum production, nausea or vomiting or other associated symptoms.        In the ED:    VS /139  Spo2 86 on non rebreather face mask.    CXR showed vascular congestion. She was put on bipap and received lasix iv and was started on nitroglycerin infusion.        Hospital Course:    #Hypoxic resp failure 2/2 acute on chronic HFrEF w/ diast dysfunc - improved    - Secondary to medication non compliance patient doesn't take her lasix daily because it "makes her go to the bathroom a lot"; last dose 3 days ago). CXR showed vascular congestion with cardiomegaly. BNP 2867. Symptoms significantly improved on BPAP and Nitro drip; now on O2 nasal canula (2L/min); is on 02 at home     - ECG showed no new changes    - c/w oxygen supplementation and titrate as tolerated. Patient is on 2 L o2 at baseline    - TTE in 2/2020 EF 38 with G2DD and moderate PHTN, mod MV regurg; mild AR; mod pulm HTN    - troponin first set : <0.01>>0.02     - Low salt diet    - Patient education on medication compliance    - Pt was complaining of urgency and a constant dribble which caused her to stop taking her lasix.    - Pt does not want to stay any longer in the hospital so f/u as an o/p to urology for a UA and workup.         -Pt feels better and has no new complaints. Pt is stable and cleared for d/c.        # Hypertensive emergency on presentation - improved    - /139 on admission    - improved s/p lasix and nitro infusion    - Last BP : 138/81    - c/w lasix, norvasc,  lisinopril and metoprolol    - d/c with home meds

## 2020-08-13 NOTE — DISCHARGE NOTE PROVIDER - NSDCCPCAREPLAN_GEN_ALL_CORE_FT
PRINCIPAL DISCHARGE DIAGNOSIS  Diagnosis: Acute pulmonary edema  Assessment and Plan of Treatment: PRINCIPAL DISCHARGE DIAGNOSIS  Diagnosis: CHF exacerbation  Assessment and Plan of Treatment: Congestive Heart Failure (CHF)  Congestive heart failure is a chronic condition in which the heart has trouble pumping blood. In some cases of heart failure, fluid may back up into your lungs or you may have swelling (edema) in your lower legs. There are many causes of heart failure including high blood pressure, coronary artery disease, abnormal heart valves, heart muscle disease, lung disease, diabetes, etc. Symptoms include shortness of breath with activity or when lying flat, cough, swelling of the legs, fatigue, or increased urination during the night.   Your condition was likely brought on by not taking your medications as prescribed. This caused your blood pressure to be very high causing heart failure. You are stable now on medication. Please take your medication as prescribed and to follow up with your doctor.   Treatment is aimed at managing the symptoms of heart failure and may include lifestyle changes, medications, or surgical procedures. Take medicines only as directed by your health care provider and do not stop unless instructed to do so. Eat heart-healthy foods with low or no trans/saturated fats, cholesterol and salt. Weigh yourself every day for early recognition of fluid accumulation.  SEEK IMMEDIATE MEDICAL CARE IF YOU HAVE ANY OF THE FOLLOWING SYMPTOMS: shortness of breath, change in mental status, chest pain, lightheadedness/dizziness/fainting, or worsening of symptoms including not being able to conduct normal physical activity.        SECONDARY DISCHARGE DIAGNOSES  Diagnosis: HTN (hypertension)  Assessment and Plan of Treatment: Hypertension  Hypertension, commonly called high blood pressure, is when the force of blood pumping through your arteries is too strong. Hypertension forces your heart to work harder to pump blood. Your arteries may become narrow or stiff. Having untreated or uncontrolled hypertension for a long period of time can cause heart attack, stroke, kidney disease, and other problems. If started on a medication, take exactly as prescribed by your health care professional. Maintain a healthy lifestyle and follow up with your primary care physician.  SEEK IMMEDIATE MEDICAL CARE IF YOU HAVE ANY OF THE FOLLOWING SYMPTOMS: severe headache, confusion, chest pain, abdominal pain, vomiting, or shortness of breath.

## 2020-08-13 NOTE — PROGRESS NOTE ADULT - ASSESSMENT
55 year old female with a pmhx of HFrEF 38%, HTN, HLD, DM COPD on 2L Home O2 presents of shortness of breath. Symptoms started 3 hours prior to presentation with gradual onset of shortness of breath and desaturation. She mentioned that she stopped taking her dose of lasix 3 days ago. In the ED VS /139  Spo2 86 on non rebreather face mask.    1.	Ac. HFrEF  2.	Hypertensive Emergency  3.	Ac. Hypoxic respiratory failure  4.	DM-2 / HTN / DL  5.	COPD on home O2          PLAN:    ·	Feels better. No sob. CXR is clear  ·	Switch her to Lasix 40 mg po q 12h  ·	Low salt diet and water restriction to 1.5 L/D  ·	Cont her other home meds  ·	D/C home    * Med rec reviewed. Plan of care D/W the pt. Time spent 39 minutes.

## 2020-08-13 NOTE — DISCHARGE NOTE PROVIDER - PROVIDER TOKENS
PROVIDER:[TOKEN:[[id number]],FOLLOWUP:[1 week]],PROVIDER:[TOKEN:[[id number]],FOLLOWUP:[2 weeks]]

## 2020-08-13 NOTE — PROGRESS NOTE ADULT - SUBJECTIVE AND OBJECTIVE BOX
USMANWILLIAN  55y Female    CHIEF COMPLAINT:    Patient is a 55y old  Female who presents with a chief complaint of shortness of breath (13 Aug 2020 11:43)      INTERVAL HPI/OVERNIGHT EVENTS:    Patient seen and examined. Feels better today. No sob. No cough. No fever.     ROS: All other systems are negative.    Vital Signs:    T(F): 97.6 (20 @ 05:07), Max: 98.3 (20 @ 13:56)  HR: 68 (20 @ 05:07) (63 - 72)  BP: 138/81 (20 @ 05:07) (112/72 - 138/81)  RR: 18 (20 @ 05:07) (16 - 18)  SpO2: 96% (20 @ 07:53) (95% - 96%)  I&O's Summary    12 Aug 2020 07:01  -  13 Aug 2020 07:00  --------------------------------------------------------  IN: 620 mL / OUT: 600 mL / NET: 20 mL      Daily     Daily Weight in k.9 (13 Aug 2020 05:07)  CAPILLARY BLOOD GLUCOSE      POCT Blood Glucose.: 122 mg/dL (13 Aug 2020 07:40)  POCT Blood Glucose.: 147 mg/dL (12 Aug 2020 21:41)  POCT Blood Glucose.: 81 mg/dL (12 Aug 2020 16:52)      PHYSICAL EXAM:    GENERAL:  NAD  SKIN: No rashes or lesions  HENT: Atraumatic Normocephalic. PERRL. Moist membranes.  NECK: Supple, No JVD. No lymphadenopathy.  PULMONARY: Chronic crackles in the lower chest post.   CVS: Normal S1, S2. Rate and Rhythm are regular. No murmurs.  ABDOMEN/GI: Soft, Nontender, Nondistended; BS present  EXTREMITIES: Peripheral pulses intact. No edema B/L LE.  NEUROLOGIC:  No motor or sensory deficit.  PSYCH: Alert & oriented x 3    Consultant(s) Notes Reviewed:  [x ] YES  [ ] NO  Care Discussed with Consultants/Other Providers [ x] YES  [ ] NO    EKG reviewed  Telemetry reviewed    LABS:                        10.8   5.46  )-----------( 229      ( 13 Aug 2020 05:26 )             34.8         142  |  105  |  28<H>  ----------------------------<  137<H>   Creatinine Trend: 1.4<--, 1.0<--, 1.1<--, 0.8<--, 0.9<--  4.2   |  26  |  1.4    Ca    9.3      13 Aug 2020 05:26  Mg     1.8         TPro  5.6<L>  /  Alb  3.4<L>  /  TBili  0.4  /  DBili  x   /  AST  12  /  ALT  7   /  AlkPhos  42  12      Serum Pro-Brain Natriuretic Peptide: 2867 pg/mL (20 @ 07:16)    Trop <0.01, CKMB --, CK --, 20 @ 05:26  Trop 0.02, CKMB --, CK --, 20 @ 16:44  Trop <0.01, CKMB --, CK --, 20 @ 07:16      Culture - Blood (collected 11 Aug 2020 09:30)  Source: .Blood Blood  Preliminary Report (12 Aug 2020 22:01):    No growth to date.    Culture - Blood (collected 11 Aug 2020 09:30)  Source: .Blood Blood  Preliminary Report (12 Aug 2020 22:01):    No growth to date.        RADIOLOGY & ADDITIONAL TESTS:      Imaging or report Personally Reviewed:  [ ] YES  [ ] NO    Medications:  Standing  amLODIPine   Tablet 10 milliGRAM(s) Oral daily  aspirin  chewable 81 milliGRAM(s) Oral daily  atorvastatin 80 milliGRAM(s) Oral at bedtime  budesonide 160 MICROgram(s)/formoterol 4.5 MICROgram(s) Inhaler 2 Puff(s) Inhalation two times a day  busPIRone 10 milliGRAM(s) Oral two times a day  clopidogrel Tablet 75 milliGRAM(s) Oral daily  dextrose 5%. 1000 milliLiter(s) IV Continuous <Continuous>  dextrose 50% Injectable 12.5 Gram(s) IV Push once  dextrose 50% Injectable 25 Gram(s) IV Push once  dextrose 50% Injectable 25 Gram(s) IV Push once  enoxaparin Injectable 40 milliGRAM(s) SubCutaneous daily  fenofibrate Tablet 145 milliGRAM(s) Oral daily  furosemide    Tablet 40 milliGRAM(s) Oral two times a day  insulin glargine Injectable (LANTUS) 15 Unit(s) SubCutaneous at bedtime  insulin lispro (HumaLOG) corrective regimen sliding scale   SubCutaneous three times a day before meals  insulin lispro Injectable (HumaLOG) 5 Unit(s) SubCutaneous three times a day before meals  lisinopril 40 milliGRAM(s) Oral daily  metoprolol succinate  milliGRAM(s) Oral daily  oxybutynin 5 milliGRAM(s) Oral two times a day  polyethylene glycol 3350 Oral Powder - Peds 17 Gram(s) Oral daily  QUEtiapine 100 milliGRAM(s) Oral at bedtime  risperiDONE   Tablet 1 milliGRAM(s) Oral daily  risperiDONE   Tablet 3 milliGRAM(s) Oral at bedtime  senna 2 Tablet(s) Oral at bedtime    PRN Meds  albuterol/ipratropium for Nebulization 3 milliLiter(s) Nebulizer every 6 hours PRN  bisacodyl Suppository 10 milliGRAM(s) Rectal daily PRN  dextrose 40% Gel 15 Gram(s) Oral once PRN  glucagon  Injectable 1 milliGRAM(s) IntraMuscular once PRN  nitroglycerin     SubLingual 0.4 milliGRAM(s) SubLingual every 5 minutes PRN      Case discussed with resident    Care discussed with pt/family

## 2020-08-16 LAB
CULTURE RESULTS: SIGNIFICANT CHANGE UP
CULTURE RESULTS: SIGNIFICANT CHANGE UP
SPECIMEN SOURCE: SIGNIFICANT CHANGE UP
SPECIMEN SOURCE: SIGNIFICANT CHANGE UP

## 2020-08-18 DIAGNOSIS — I16.1 HYPERTENSIVE EMERGENCY: ICD-10-CM

## 2020-08-18 DIAGNOSIS — Z86.73 PERSONAL HISTORY OF TRANSIENT ISCHEMIC ATTACK (TIA), AND CEREBRAL INFARCTION WITHOUT RESIDUAL DEFICITS: ICD-10-CM

## 2020-08-18 DIAGNOSIS — F41.9 ANXIETY DISORDER, UNSPECIFIED: ICD-10-CM

## 2020-08-18 DIAGNOSIS — Z91.19 PATIENT'S NONCOMPLIANCE WITH OTHER MEDICAL TREATMENT AND REGIMEN: ICD-10-CM

## 2020-08-18 DIAGNOSIS — Z79.01 LONG TERM (CURRENT) USE OF ANTICOAGULANTS: ICD-10-CM

## 2020-08-18 DIAGNOSIS — J96.01 ACUTE RESPIRATORY FAILURE WITH HYPOXIA: ICD-10-CM

## 2020-08-18 DIAGNOSIS — I50.9 HEART FAILURE, UNSPECIFIED: ICD-10-CM

## 2020-08-18 DIAGNOSIS — N18.2 CHRONIC KIDNEY DISEASE, STAGE 2 (MILD): ICD-10-CM

## 2020-08-18 DIAGNOSIS — E78.5 HYPERLIPIDEMIA, UNSPECIFIED: ICD-10-CM

## 2020-08-18 DIAGNOSIS — F32.9 MAJOR DEPRESSIVE DISORDER, SINGLE EPISODE, UNSPECIFIED: ICD-10-CM

## 2020-08-18 DIAGNOSIS — J44.9 CHRONIC OBSTRUCTIVE PULMONARY DISEASE, UNSPECIFIED: ICD-10-CM

## 2020-08-18 DIAGNOSIS — Z79.82 LONG TERM (CURRENT) USE OF ASPIRIN: ICD-10-CM

## 2020-08-18 DIAGNOSIS — I50.33 ACUTE ON CHRONIC DIASTOLIC (CONGESTIVE) HEART FAILURE: ICD-10-CM

## 2020-08-18 DIAGNOSIS — E11.22 TYPE 2 DIABETES MELLITUS WITH DIABETIC CHRONIC KIDNEY DISEASE: ICD-10-CM

## 2020-08-18 DIAGNOSIS — I13.0 HYPERTENSIVE HEART AND CHRONIC KIDNEY DISEASE WITH HEART FAILURE AND STAGE 1 THROUGH STAGE 4 CHRONIC KIDNEY DISEASE, OR UNSPECIFIED CHRONIC KIDNEY DISEASE: ICD-10-CM

## 2020-08-22 DIAGNOSIS — Z99.81 DEPENDENCE ON SUPPLEMENTAL OXYGEN: ICD-10-CM

## 2020-09-24 ENCOUNTER — APPOINTMENT (OUTPATIENT)
Dept: UROLOGY | Facility: CLINIC | Age: 55
End: 2020-09-24

## 2020-11-25 ENCOUNTER — OUTPATIENT (OUTPATIENT)
Dept: OUTPATIENT SERVICES | Facility: HOSPITAL | Age: 55
LOS: 1 days | Discharge: HOME | End: 2020-11-25

## 2020-11-25 DIAGNOSIS — Z11.59 ENCOUNTER FOR SCREENING FOR OTHER VIRAL DISEASES: ICD-10-CM

## 2020-11-28 ENCOUNTER — OUTPATIENT (OUTPATIENT)
Dept: OUTPATIENT SERVICES | Facility: HOSPITAL | Age: 55
LOS: 1 days | Discharge: HOME | End: 2020-11-28

## 2020-11-30 DIAGNOSIS — G47.33 OBSTRUCTIVE SLEEP APNEA (ADULT) (PEDIATRIC): ICD-10-CM

## 2021-06-06 ENCOUNTER — INPATIENT (INPATIENT)
Facility: HOSPITAL | Age: 56
LOS: 15 days | Discharge: HOME HEALTH | End: 2021-06-22
Attending: STUDENT IN AN ORGANIZED HEALTH CARE EDUCATION/TRAINING PROGRAM | Admitting: STUDENT IN AN ORGANIZED HEALTH CARE EDUCATION/TRAINING PROGRAM
Payer: MEDICARE

## 2021-06-06 VITALS
HEART RATE: 102 BPM | SYSTOLIC BLOOD PRESSURE: 160 MMHG | OXYGEN SATURATION: 98 % | RESPIRATION RATE: 20 BRPM | TEMPERATURE: 100 F | HEIGHT: 60 IN | WEIGHT: 179.9 LBS | DIASTOLIC BLOOD PRESSURE: 99 MMHG

## 2021-06-06 LAB
ALBUMIN SERPL ELPH-MCNC: 3.5 G/DL — SIGNIFICANT CHANGE UP (ref 3.5–5.2)
ALP SERPL-CCNC: 105 U/L — SIGNIFICANT CHANGE UP (ref 30–115)
ALT FLD-CCNC: 11 U/L — SIGNIFICANT CHANGE UP (ref 0–41)
ANION GAP SERPL CALC-SCNC: 11 MMOL/L — SIGNIFICANT CHANGE UP (ref 7–14)
AST SERPL-CCNC: 14 U/L — SIGNIFICANT CHANGE UP (ref 0–41)
BASOPHILS # BLD AUTO: 0.07 K/UL — SIGNIFICANT CHANGE UP (ref 0–0.2)
BASOPHILS NFR BLD AUTO: 1.4 % — HIGH (ref 0–1)
BILIRUB SERPL-MCNC: 1.2 MG/DL — SIGNIFICANT CHANGE UP (ref 0.2–1.2)
BUN SERPL-MCNC: 11 MG/DL — SIGNIFICANT CHANGE UP (ref 10–20)
CALCIUM SERPL-MCNC: 8.8 MG/DL — SIGNIFICANT CHANGE UP (ref 8.5–10.1)
CHLORIDE SERPL-SCNC: 102 MMOL/L — SIGNIFICANT CHANGE UP (ref 98–110)
CO2 SERPL-SCNC: 27 MMOL/L — SIGNIFICANT CHANGE UP (ref 17–32)
CREAT SERPL-MCNC: 0.9 MG/DL — SIGNIFICANT CHANGE UP (ref 0.7–1.5)
EOSINOPHIL # BLD AUTO: 0.06 K/UL — SIGNIFICANT CHANGE UP (ref 0–0.7)
EOSINOPHIL NFR BLD AUTO: 1.2 % — SIGNIFICANT CHANGE UP (ref 0–8)
GLUCOSE SERPL-MCNC: 166 MG/DL — HIGH (ref 70–99)
HCT VFR BLD CALC: 41.5 % — SIGNIFICANT CHANGE UP (ref 37–47)
HGB BLD-MCNC: 12.7 G/DL — SIGNIFICANT CHANGE UP (ref 12–16)
IMM GRANULOCYTES NFR BLD AUTO: 0.8 % — HIGH (ref 0.1–0.3)
LYMPHOCYTES # BLD AUTO: 1.65 K/UL — SIGNIFICANT CHANGE UP (ref 1.2–3.4)
LYMPHOCYTES # BLD AUTO: 32.3 % — SIGNIFICANT CHANGE UP (ref 20.5–51.1)
MAGNESIUM SERPL-MCNC: 1.6 MG/DL — LOW (ref 1.8–2.4)
MCHC RBC-ENTMCNC: 28 PG — SIGNIFICANT CHANGE UP (ref 27–31)
MCHC RBC-ENTMCNC: 30.6 G/DL — LOW (ref 32–37)
MCV RBC AUTO: 91.4 FL — SIGNIFICANT CHANGE UP (ref 81–99)
MONOCYTES # BLD AUTO: 0.23 K/UL — SIGNIFICANT CHANGE UP (ref 0.1–0.6)
MONOCYTES NFR BLD AUTO: 4.5 % — SIGNIFICANT CHANGE UP (ref 1.7–9.3)
NEUTROPHILS # BLD AUTO: 3.06 K/UL — SIGNIFICANT CHANGE UP (ref 1.4–6.5)
NEUTROPHILS NFR BLD AUTO: 59.8 % — SIGNIFICANT CHANGE UP (ref 42.2–75.2)
NRBC # BLD: 0 /100 WBCS — SIGNIFICANT CHANGE UP (ref 0–0)
NT-PROBNP SERPL-SCNC: 7729 PG/ML — HIGH (ref 0–300)
PLATELET # BLD AUTO: 223 K/UL — SIGNIFICANT CHANGE UP (ref 130–400)
POTASSIUM SERPL-MCNC: 4.1 MMOL/L — SIGNIFICANT CHANGE UP (ref 3.5–5)
POTASSIUM SERPL-SCNC: 4.1 MMOL/L — SIGNIFICANT CHANGE UP (ref 3.5–5)
PROT SERPL-MCNC: 5.9 G/DL — LOW (ref 6–8)
RBC # BLD: 4.54 M/UL — SIGNIFICANT CHANGE UP (ref 4.2–5.4)
RBC # FLD: 18.6 % — HIGH (ref 11.5–14.5)
SARS-COV-2 RNA SPEC QL NAA+PROBE: SIGNIFICANT CHANGE UP
SODIUM SERPL-SCNC: 140 MMOL/L — SIGNIFICANT CHANGE UP (ref 135–146)
TROPONIN T SERPL-MCNC: <0.01 NG/ML — SIGNIFICANT CHANGE UP
WBC # BLD: 5.11 K/UL — SIGNIFICANT CHANGE UP (ref 4.8–10.8)
WBC # FLD AUTO: 5.11 K/UL — SIGNIFICANT CHANGE UP (ref 4.8–10.8)

## 2021-06-06 PROCEDURE — 71045 X-RAY EXAM CHEST 1 VIEW: CPT | Mod: 26

## 2021-06-06 PROCEDURE — 99285 EMERGENCY DEPT VISIT HI MDM: CPT

## 2021-06-06 PROCEDURE — 93010 ELECTROCARDIOGRAM REPORT: CPT

## 2021-06-06 RX ORDER — MAGNESIUM SULFATE 500 MG/ML
2 VIAL (ML) INJECTION ONCE
Refills: 0 | Status: COMPLETED | OUTPATIENT
Start: 2021-06-06 | End: 2021-06-06

## 2021-06-06 RX ORDER — ATORVASTATIN CALCIUM 80 MG/1
80 TABLET, FILM COATED ORAL AT BEDTIME
Refills: 0 | Status: DISCONTINUED | OUTPATIENT
Start: 2021-06-06 | End: 2021-06-22

## 2021-06-06 RX ORDER — CLOPIDOGREL BISULFATE 75 MG/1
75 TABLET, FILM COATED ORAL DAILY
Refills: 0 | Status: DISCONTINUED | OUTPATIENT
Start: 2021-06-06 | End: 2021-06-22

## 2021-06-06 RX ORDER — SENNA PLUS 8.6 MG/1
2 TABLET ORAL AT BEDTIME
Refills: 0 | Status: DISCONTINUED | OUTPATIENT
Start: 2021-06-06 | End: 2021-06-22

## 2021-06-06 RX ORDER — ASPIRIN/CALCIUM CARB/MAGNESIUM 324 MG
81 TABLET ORAL DAILY
Refills: 0 | Status: DISCONTINUED | OUTPATIENT
Start: 2021-06-06 | End: 2021-06-22

## 2021-06-06 RX ORDER — CHLORHEXIDINE GLUCONATE 213 G/1000ML
1 SOLUTION TOPICAL
Refills: 0 | Status: DISCONTINUED | OUTPATIENT
Start: 2021-06-06 | End: 2021-06-22

## 2021-06-06 RX ORDER — LISINOPRIL 2.5 MG/1
40 TABLET ORAL DAILY
Refills: 0 | Status: DISCONTINUED | OUTPATIENT
Start: 2021-06-06 | End: 2021-06-11

## 2021-06-06 RX ORDER — FUROSEMIDE 40 MG
40 TABLET ORAL ONCE
Refills: 0 | Status: COMPLETED | OUTPATIENT
Start: 2021-06-06 | End: 2021-06-06

## 2021-06-06 RX ORDER — FUROSEMIDE 40 MG
40 TABLET ORAL EVERY 12 HOURS
Refills: 0 | Status: DISCONTINUED | OUTPATIENT
Start: 2021-06-07 | End: 2021-06-14

## 2021-06-06 RX ORDER — RISPERIDONE 4 MG/1
3 TABLET ORAL AT BEDTIME
Refills: 0 | Status: DISCONTINUED | OUTPATIENT
Start: 2021-06-06 | End: 2021-06-17

## 2021-06-06 RX ORDER — POLYETHYLENE GLYCOL 3350 17 G/17G
17 POWDER, FOR SOLUTION ORAL
Refills: 0 | Status: DISCONTINUED | OUTPATIENT
Start: 2021-06-06 | End: 2021-06-22

## 2021-06-06 RX ORDER — QUETIAPINE FUMARATE 200 MG/1
100 TABLET, FILM COATED ORAL AT BEDTIME
Refills: 0 | Status: DISCONTINUED | OUTPATIENT
Start: 2021-06-06 | End: 2021-06-17

## 2021-06-06 RX ORDER — BUDESONIDE AND FORMOTEROL FUMARATE DIHYDRATE 160; 4.5 UG/1; UG/1
2 AEROSOL RESPIRATORY (INHALATION)
Refills: 0 | Status: DISCONTINUED | OUTPATIENT
Start: 2021-06-06 | End: 2021-06-22

## 2021-06-06 RX ORDER — METOPROLOL TARTRATE 50 MG
200 TABLET ORAL DAILY
Refills: 0 | Status: DISCONTINUED | OUTPATIENT
Start: 2021-06-07 | End: 2021-06-16

## 2021-06-06 RX ORDER — RISPERIDONE 4 MG/1
1 TABLET ORAL DAILY
Refills: 0 | Status: DISCONTINUED | OUTPATIENT
Start: 2021-06-07 | End: 2021-06-17

## 2021-06-06 RX ADMIN — Medication 50 GRAM(S): at 21:53

## 2021-06-06 RX ADMIN — Medication 40 MILLIGRAM(S): at 21:52

## 2021-06-06 NOTE — ED PROVIDER NOTE - ATTENDING CONTRIBUTION TO CARE
56 year old female with a pmhx of chronic hypoxic respiratory failure (on 4L home O2) secondary to  HFrEF 38% (last exacerbation in 2020) , HTN, HLD, DM2, active smoker , CVA with residual LE weakness (2014, wheelchair bound),  COPD , mood /depression,  presents of worsening shortness of breath and increase weight with  B/L LE x 2weeks. Patient mentions that she hasnt been taking lasix for the past two weeks as she she ran out of it. Reports she is non compliant with PO fluid intake. Denies any chest pain, nausea, vomiting, urinary symptoms, fever, chills. Of note patient received 1 st dose of Moderna two weeks, due for 2nd shot on 8th june.  PE:  agree with above.  A/P:  CHF Decompensation.  Imaging, Meds and Reassess.

## 2021-06-06 NOTE — H&P ADULT - HISTORY OF PRESENT ILLNESS
56 year old female with a pmhx of chronic hypoxic respiratory failure (on 4L home O2) secondary to  HFrEF 38% (last exacerbation in 2020) , HTN, HLD, DM2, active smoker , CVA with residual LE weakness (2014, wheelchair bound),  COPD  presents of worsening shortness of breath and increase weight with  B/L LE x 2weeks. Patient mentions that she hasnt been taking lasix for the past two weeks as she she ran out of it. Reports she is non compliant with fluid intake. Denies any chest pain, nausea, vomiting, urinary symptoms, fever, chills. Of note patient received 1 st dose of Moderna two weeks, due for 2nd shot on 8th june.     ER Course:  Vital Signs:  T(F): 99.6 (06 Jun 2021 19:33), Max: 99.6 (06 Jun 2021 19:33)  HR: 103 (06 Jun 2021 23:00) (102 - 103)  BP: 151/95 (06 Jun 2021 23:00) (151/95 - 160/99)  RR: 20 (06 Jun 2021 23:00) (20 - 20)  SpO2: 94% (06 Jun 2021 23:00) (94% - 98%)    Patient was placed on 6l, improved , s/p 40mg lasix   56 year old female with a pmhx of chronic hypoxic respiratory failure (on 4L home O2) secondary to  HFrEF 38% (last exacerbation in 2020) , HTN, HLD, DM2, active smoker , CVA with residual LE weakness (2014, wheelchair bound),  COPD , mood /depression,  presents of worsening shortness of breath and increase weight with  B/L LE x 2weeks. Patient mentions that she hasnt been taking lasix for the past two weeks as she she ran out of it. Reports she is non compliant with fluid intake. Denies any chest pain, nausea, vomiting, urinary symptoms, fever, chills. Of note patient received 1 st dose of Moderna two weeks, due for 2nd shot on 8th june.     ER Course:  Vital Signs:  T(F): 99.6 (06 Jun 2021 19:33), Max: 99.6 (06 Jun 2021 19:33)  HR: 103 (06 Jun 2021 23:00) (102 - 103)  BP: 151/95 (06 Jun 2021 23:00) (151/95 - 160/99)  RR: 20 (06 Jun 2021 23:00) (20 - 20)  SpO2: 94% (06 Jun 2021 23:00) (94% - 98%)    Patient was placed on 6l, improved , s/p 40mg lasix

## 2021-06-06 NOTE — H&P ADULT - NSHPSOCIALHISTORY_GEN_ALL_CORE
Marital Status:  (   )    (   ) Single    (   )    (  )   Lives with: ( x ) alone  (  ) children   (  ) spouse   (  ) parents  (  ) other  Recent Travel: No recent travel    Substance Use (street drugs): ( x ) never used  (  ) other:  Tobacco Usage:  (  ) never smoked   (   ) former smoker   (  x ) current smoker  ( 30  ) pack year  Alcohol Usage: None   Baseline mobility: wheelchair bound, has HHA 6hrs/day 7 days a week

## 2021-06-06 NOTE — H&P ADULT - NSHPLABSRESULTS_GEN_ALL_CORE
12.7   5.11  )-----------( 223      ( 06 Jun 2021 20:07 )             41.5       06-06    140  |  102  |  11  ----------------------------<  166<H>  4.1   |  27  |  0.9    Ca    8.8      06 Jun 2021 20:07  Mg     1.6     06-06    TPro  5.9<L>  /  Alb  3.5  /  TBili  1.2  /  DBili  x   /  AST  14  /  ALT  11  /  AlkPhos  105  06-06    CARDIAC MARKERS ( 06 Jun 2021 20:07 )  x     / <0.01 ng/mL / x     / x     / x

## 2021-06-06 NOTE — H&P ADULT - NSHPPHYSICALEXAM_GEN_ALL_CORE
PHYSICAL EXAM:  GENERAL: NAD, AAO x 4, 56y F  HEAD:  Atraumatic, Normocephalic  EYES: EOMI, conjunctiva and sclera white  NECK: Supple, No JVD  CHEST/LUNG: crackles +; No wheeze; ; No accessory muscles used  HEART: Regular rate and rhythm; No murmurs;   ABDOMEN: Soft, Nontender, Nondistended; Bowel sounds present; No guarding, No organomegaly  EXTREMITIES:  2+ Peripheral Pulses, No cyanosis or edema  NEUROLOGY: LE: 2/5

## 2021-06-06 NOTE — ED ADULT TRIAGE NOTE - CHIEF COMPLAINT QUOTE
pt has been out of her water pill for 1 week.  called pmd who did not return call. pt has b/l le edema. did not take her htn medication today. left sided weakness from prior cva in 2014 pt has been out of her water pill for 1 week.  called pmd who did not return call. pt does not recall name of water pill.  pt has b/l le edema. did not take her htn medication today. left sided weakness from prior cva in 2014. pt speaking in full sentences. pt has been out of her water pill for 1 week.  called pmd who did not return call. pt does not recall name of water pill.  pt has b/l le edema. did not take her htn medication today. left sided weakness from prior cva in 2014. pt speaking in full sentences. pt received 1st covid vaccine. due for 2nd vaccine this tuesday

## 2021-06-06 NOTE — H&P ADULT - ATTENDING COMMENTS
55 YO F with a PMH of chronic hypoxic respiratory failure from COPD (4L home O2), HFrEF w/ moderate pulm HTN, HTN, HLD, DM2, CVA (residual LE weakness; WC-bound), and depression who was BIBEMS for eval of progressively worsening SOB for the past x 2 weeks. Associated with B/L LE swelling. + orthopnea, + non-productive cough. Does not take medications daily, ran out of Lasix x 2 weeks ago. [place] not follows strict fluid restriction. Denies any fevers/chills, CP, palpitations, N/V/D, ABD pain, dysuria, headaches, or rashes.     In the ED, Chest X-ray shows pulm vascular congestion and cardiomegaly. BNP elevated. Started on IV Lasix. Pt hypoxic to 94% on NC, escalated to NRB and descalated back to NC.     FMHx: Reviewed, not relevant    Physical exam shows pt in NAD. VSS, afebrile, not hypoxic on 3L NC. A&Ox3. Non-focal neuro exam. Muscle strength/sensation intact. Crackles noted in B/L lung fields. RRR, no M/G/R. ABD is soft and non-tender, normoactive BSs. B/L LEs with 1+ pitting edema that extends to the mid-shin. No rashes. Labs and radiology as above.    Acute on chronic hypoxic respiratory failure due to acute on chronic HFrEF w/ mod pulm HTN, from medication non-compliance. IV Lasix and transition to PO. Echo. Monitor daily weights, Is&Os, and diet/fluid restriction. BMP in the AM. Restart home meds.     Magnesium deficiency. Replace. QTc prolongation present.     Prolonged QTc. Monitor Ca, K, and Mg levels. Replace/Treat PRN. Serial EKGs. Hold QT prolonging agents.    Hx of HTN, HLD, DM2, CVA (residual LE weakness; WC-bound), and depression. Restart home meds, except as stated above. DVT PPX. Inform PCP of pt's admission to hospital. My note supersedes the residents note. 55 YO F with a PMH of chronic hypoxic respiratory failure from COPD (4L home O2), HFrEF w/ moderate pulm HTN, HTN, HLD, DM2, CVA (residual LE weakness; WC-bound), and depression who was BIBEMS for eval of progressively worsening SOB for the past x 2 weeks. Associated with B/L LE swelling. + orthopnea, + non-productive cough. Does not take medications daily, ran out of Lasix x 2 weeks ago. [place] not follows strict fluid restriction. Denies any fevers/chills, CP, palpitations, N/V/D, ABD pain, dysuria, headaches, or rashes.     In the ED, Chest X-ray shows pulm vascular congestion and cardiomegaly. BNP elevated. Started on IV Lasix. Pt hypoxic to 94% on NC, escalated to NRB and descalated back to NC.     FMHx: Reviewed, not relevant    Physical exam shows pt in NAD. VSS, afebrile, not hypoxic on 3L NC. A&Ox3. Non-focal neuro exam. Muscle strength/sensation intact. Crackles noted in B/L lung fields. RRR, no M/G/R. ABD is soft and non-tender, normoactive BSs. B/L LEs with 1+ pitting edema that extends to the mid-shin. No rashes. Labs and radiology as above.    Acute on chronic hypoxic respiratory failure due to acute on chronic HFrEF w/ mod pulm HTN, from medication non-compliance. IV Lasix and transition to PO. Echo. Monitor daily weights, Is&Os, and diet/fluid restriction. BMP in the AM. Restart home meds.     Magnesium deficiency. Replace. QTc prolongation present.     Prolonged QTc. Monitor Ca, K, and Mg levels. Replace/Treat PRN. Serial EKGs. Hold QT prolonging agents.    Tobacco abuse with counseling. Pt extensively counseled on the benefits of smoking cessation and alternative therapies. Counseled for 15-20 minutes. Pt would like to think about their options prior to making a decision.     Hx of HTN, HLD, DM2, CVA (residual LE weakness; WC-bound), and depression. Restart home meds, except as stated above. DVT PPX. Inform PCP of pt's admission to hospital. My note supersedes the residents note.

## 2021-06-06 NOTE — ED PROVIDER NOTE - OBJECTIVE STATEMENT
Pt is a 56 year old female with PMH CHF (Last EF in 2020, 35%) HTN, HLD, COPD (on home o2 at 3 lpm), CVA, CKD presents to ED with complaints of SOB. Pt states for past 1-2 weeks has been experiencing sob, worse wih exertion. Pt states now becoming sob with walking around home. Pt also attests to recent weight gain in same time frame of 10-20 lbs. Pt denies any cough, fever, chills, bodyaches, chest pain, abdominal pain, NVCD. (1) dose vaccinated for COVID.

## 2021-06-06 NOTE — ED PROVIDER NOTE - PHYSICAL EXAMINATION
Physical Exam    Vital Signs: I have reviewed the initial vital signs.  Constitutional: well-nourished, appears stated age, no acute distress  Eyes: Conjunctiva pink, Sclera clear, PERRLA, EOMI.  Cardiovascular: S1 and S2, regular rate, regular rhythm, well-perfused extremities, radial pulses equal and 2+  Respiratory: labored respiratory effort with tachypnea, rales at bases to auscultation bilaterally no wheezing, and rhonchi  Gastrointestinal: soft, non-tender abdomen, no pulsatile mass, normal bowl sounds  Musculoskeletal: supple neck, B/L lower extremity edema, no midline tenderness  Integumentary: warm, dry, no rash  Neurologic: awake, alert, cranial nerves II-XII grossly intact, extremities’ motor and sensory functions grossly intact  Psychiatric: appropriate mood, appropriate affect

## 2021-06-06 NOTE — H&P ADULT - ASSESSMENT
56 year old female with a pmhx of chronic hypoxic respiratory failure (on 4L home O2) secondary to  HFrEF 38% (last exacerbation in ) , HTN, HLD, DM2, active smoker , CORNELIUS on CPAP, CVA with residual LE weakness (, wheelchair bound),  COPD  presents of worsening shortness of breath and increase weight with  B/L LE x 2weeks.     #Acute on chronic hypoxic respiratory failure (on 4L at home) due to CHF exacerbation due to non compliance  #Acute on chronic diastolic HFrEF (38%)  - patient non compliant with fluids and medications  - was on 6L NC saturating 98%, currently on 4L saturating 96% (currently at baseline)  - BNP: 7726, tropx1 negative  - CXR: cardiomegaly, fu am CXR  - s/p 40mg lasix  - Admit to telemetry  - IV lasix 40mg q12h: monitor renal function, electrolytes, daily weight and volume status, strict I&Os; keep I<Os and adjust diuretics accordingly, fluid restriction to 1.2L  - Continue lisinopril and metoprolol succinate 200mg ; monitor BP and HR  - Check serial cardiac enzymes  - Low salt diet  - Patient education on medication compliance  - dietician consult  - PT consult    #Hypomagnesemia  - M.6 s/p 1 IV dose  - fu am Mg    # DM 2  - basal and nutritional insulin with sliding scale  - f/u BS and adjust if needed  - A1c: 6.2 ()  - fu A1c, lipid panel in am    # HLD  - c/w statin    # COPD (on home O2)   - not in exacerbation  - symbicort  (mentions she is not taking)    #Possibly has severe pulm HTN (possibly group 2)  - Moderate as per ECHO but low EF so probable underestimation     #CORNELIUS/OHS ? on CPAP  - ordered CPAP  - needs pulm fu    #Hx of CVA with residual LE weakness  - pt wheelchair bound  - has HHA 6hrs/per day 7 days a week  - c/w ASA/plavix/statin    # DVT ppx: lovenox  # PPI ppx: no need  # DIET: DASH carb consistent  # ACTIVITY: IAT  # DISPO: From home   56 year old female with a pmhx of chronic hypoxic respiratory failure (on 4L home O2) secondary to  HFrEF 38% (last exacerbation in ) , HTN, HLD, DM2, active smoker , CORNELIUS on CPAP, CVA with residual LE weakness (, wheelchair bound),  COPD, mood /depression presents of worsening shortness of breath and increase weight with  B/L LE x 2weeks.     #Acute on chronic hypoxic respiratory failure (on 4L at home) due to CHF exacerbation due to non compliance  #Acute on chronic diastolic HFrEF (38%)  - patient non compliant with fluids and medications  - was on 6L NC saturating 98%, currently on 4L saturating 96% (currently at baseline)  - BNP: 7726, tropx1 negative  - CXR: cardiomegaly, fu am CXR  - s/p 40mg lasix  - Admit to telemetry  - IV lasix 40mg q12h: monitor renal function, electrolytes, daily weight and volume status, strict I&Os; keep I<Os and adjust diuretics accordingly, fluid restriction to 1.2L  - Continue lisinopril and metoprolol succinate 200mg ; monitor BP and HR  - Check serial cardiac enzymes  - Low salt diet  - Patient education on medication compliance  - dietician consult  - PT consult    #Hypomagnesemia  - M.6 s/p 1 IV dose  - fu am Mg    # DM 2  - basal and nutritional insulin with sliding scale  - f/u BS and adjust if needed  - A1c: 6.2 ()  - fu A1c, lipid panel in am    # HLD  - c/w statin    # COPD (on home O2)   - not in exacerbation  - symbicort  (mentions she is not taking)    #Possibly has severe pulm HTN (possibly group 2)  - Moderate as per ECHO but low EF so probable underestimation     #CORNELIUS/OHS ? on CPAP  - ordered CPAP  - needs pulm fu    #Hx of CVA with residual LE weakness  - pt wheelchair bound  - has HHA 6hrs/per day 7 days a week  - c/w ASA/plavix/statin    #mood /depression  - c/w home meds    # DVT ppx: lovenox  # PPI ppx: no need  # DIET: DASH carb consistent, fluid restriction  # ACTIVITY: IAT  # DISPO: From home  PLEASE CONFIRM meds from [facility] pharmacy , ordered meds as per patient

## 2021-06-07 LAB
A1C WITH ESTIMATED AVERAGE GLUCOSE RESULT: 6.5 % — HIGH (ref 4–5.6)
ANION GAP SERPL CALC-SCNC: 12 MMOL/L — SIGNIFICANT CHANGE UP (ref 7–14)
BASOPHILS # BLD AUTO: 0.06 K/UL — SIGNIFICANT CHANGE UP (ref 0–0.2)
BASOPHILS NFR BLD AUTO: 1.1 % — HIGH (ref 0–1)
BUN SERPL-MCNC: 11 MG/DL — SIGNIFICANT CHANGE UP (ref 10–20)
CALCIUM SERPL-MCNC: 9 MG/DL — SIGNIFICANT CHANGE UP (ref 8.5–10.1)
CHLORIDE SERPL-SCNC: 101 MMOL/L — SIGNIFICANT CHANGE UP (ref 98–110)
CHOLEST SERPL-MCNC: 105 MG/DL — SIGNIFICANT CHANGE UP
CK MB CFR SERPL CALC: 1.3 NG/ML — SIGNIFICANT CHANGE UP (ref 0.6–6.3)
CK MB CFR SERPL CALC: 1.5 NG/ML — SIGNIFICANT CHANGE UP (ref 0.6–6.3)
CK SERPL-CCNC: 71 U/L — SIGNIFICANT CHANGE UP (ref 0–225)
CO2 SERPL-SCNC: 28 MMOL/L — SIGNIFICANT CHANGE UP (ref 17–32)
COVID-19 SPIKE DOMAIN AB INTERP: POSITIVE
COVID-19 SPIKE DOMAIN ANTIBODY RESULT: 94.3 U/ML — HIGH
CREAT SERPL-MCNC: 0.9 MG/DL — SIGNIFICANT CHANGE UP (ref 0.7–1.5)
EOSINOPHIL # BLD AUTO: 0.1 K/UL — SIGNIFICANT CHANGE UP (ref 0–0.7)
EOSINOPHIL NFR BLD AUTO: 1.8 % — SIGNIFICANT CHANGE UP (ref 0–8)
ESTIMATED AVERAGE GLUCOSE: 140 MG/DL — HIGH (ref 68–114)
GLUCOSE BLDC GLUCOMTR-MCNC: 111 MG/DL — HIGH (ref 70–99)
GLUCOSE BLDC GLUCOMTR-MCNC: 156 MG/DL — HIGH (ref 70–99)
GLUCOSE BLDC GLUCOMTR-MCNC: 182 MG/DL — HIGH (ref 70–99)
GLUCOSE SERPL-MCNC: 101 MG/DL — HIGH (ref 70–99)
HCT VFR BLD CALC: 40.2 % — SIGNIFICANT CHANGE UP (ref 37–47)
HDLC SERPL-MCNC: 29 MG/DL — LOW
HGB BLD-MCNC: 12.6 G/DL — SIGNIFICANT CHANGE UP (ref 12–16)
IMM GRANULOCYTES NFR BLD AUTO: 0.4 % — HIGH (ref 0.1–0.3)
LIPID PNL WITH DIRECT LDL SERPL: 42 MG/DL — SIGNIFICANT CHANGE UP
LYMPHOCYTES # BLD AUTO: 1.6 K/UL — SIGNIFICANT CHANGE UP (ref 1.2–3.4)
LYMPHOCYTES # BLD AUTO: 29.2 % — SIGNIFICANT CHANGE UP (ref 20.5–51.1)
MAGNESIUM SERPL-MCNC: 1.8 MG/DL — SIGNIFICANT CHANGE UP (ref 1.8–2.4)
MCHC RBC-ENTMCNC: 28.2 PG — SIGNIFICANT CHANGE UP (ref 27–31)
MCHC RBC-ENTMCNC: 31.3 G/DL — LOW (ref 32–37)
MCV RBC AUTO: 89.9 FL — SIGNIFICANT CHANGE UP (ref 81–99)
MONOCYTES # BLD AUTO: 0.23 K/UL — SIGNIFICANT CHANGE UP (ref 0.1–0.6)
MONOCYTES NFR BLD AUTO: 4.2 % — SIGNIFICANT CHANGE UP (ref 1.7–9.3)
MYOGLOBIN SERPL-MCNC: <21 NG/ML — SIGNIFICANT CHANGE UP (ref 0–70)
NEUTROPHILS # BLD AUTO: 3.47 K/UL — SIGNIFICANT CHANGE UP (ref 1.4–6.5)
NEUTROPHILS NFR BLD AUTO: 63.3 % — SIGNIFICANT CHANGE UP (ref 42.2–75.2)
NON HDL CHOLESTEROL: 76 MG/DL — SIGNIFICANT CHANGE UP
NRBC # BLD: 0 /100 WBCS — SIGNIFICANT CHANGE UP (ref 0–0)
PHOSPHATE SERPL-MCNC: 2.9 MG/DL — SIGNIFICANT CHANGE UP (ref 2.1–4.9)
PLATELET # BLD AUTO: 224 K/UL — SIGNIFICANT CHANGE UP (ref 130–400)
POTASSIUM SERPL-MCNC: 3.6 MMOL/L — SIGNIFICANT CHANGE UP (ref 3.5–5)
POTASSIUM SERPL-SCNC: 3.6 MMOL/L — SIGNIFICANT CHANGE UP (ref 3.5–5)
RBC # BLD: 4.47 M/UL — SIGNIFICANT CHANGE UP (ref 4.2–5.4)
RBC # FLD: 18.9 % — HIGH (ref 11.5–14.5)
SARS-COV-2 IGG+IGM SERPL QL IA: 94.3 U/ML — HIGH
SARS-COV-2 IGG+IGM SERPL QL IA: POSITIVE
SODIUM SERPL-SCNC: 141 MMOL/L — SIGNIFICANT CHANGE UP (ref 135–146)
TRIGL SERPL-MCNC: 183 MG/DL — HIGH
TROPONIN T SERPL-MCNC: <0.01 NG/ML — SIGNIFICANT CHANGE UP
TROPONIN T SERPL-MCNC: <0.01 NG/ML — SIGNIFICANT CHANGE UP
WBC # BLD: 5.48 K/UL — SIGNIFICANT CHANGE UP (ref 4.8–10.8)
WBC # FLD AUTO: 5.48 K/UL — SIGNIFICANT CHANGE UP (ref 4.8–10.8)

## 2021-06-07 PROCEDURE — 99222 1ST HOSP IP/OBS MODERATE 55: CPT

## 2021-06-07 PROCEDURE — 99223 1ST HOSP IP/OBS HIGH 75: CPT | Mod: 25

## 2021-06-07 PROCEDURE — 99406 BEHAV CHNG SMOKING 3-10 MIN: CPT

## 2021-06-07 PROCEDURE — 71045 X-RAY EXAM CHEST 1 VIEW: CPT | Mod: 26

## 2021-06-07 PROCEDURE — 93010 ELECTROCARDIOGRAM REPORT: CPT

## 2021-06-07 RX ORDER — METOPROLOL TARTRATE 50 MG
5 TABLET ORAL ONCE
Refills: 0 | Status: COMPLETED | OUTPATIENT
Start: 2021-06-07 | End: 2021-06-07

## 2021-06-07 RX ORDER — GLUCAGON INJECTION, SOLUTION 0.5 MG/.1ML
1 INJECTION, SOLUTION SUBCUTANEOUS ONCE
Refills: 0 | Status: DISCONTINUED | OUTPATIENT
Start: 2021-06-07 | End: 2021-06-22

## 2021-06-07 RX ORDER — INSULIN LISPRO 100/ML
4 VIAL (ML) SUBCUTANEOUS
Refills: 0 | Status: DISCONTINUED | OUTPATIENT
Start: 2021-06-07 | End: 2021-06-22

## 2021-06-07 RX ORDER — AMIODARONE HYDROCHLORIDE 400 MG/1
150 TABLET ORAL ONCE
Refills: 0 | Status: DISCONTINUED | OUTPATIENT
Start: 2021-06-07 | End: 2021-06-07

## 2021-06-07 RX ORDER — DEXTROSE 50 % IN WATER 50 %
15 SYRINGE (ML) INTRAVENOUS ONCE
Refills: 0 | Status: DISCONTINUED | OUTPATIENT
Start: 2021-06-07 | End: 2021-06-22

## 2021-06-07 RX ORDER — DEXTROSE 50 % IN WATER 50 %
12.5 SYRINGE (ML) INTRAVENOUS ONCE
Refills: 0 | Status: DISCONTINUED | OUTPATIENT
Start: 2021-06-07 | End: 2021-06-22

## 2021-06-07 RX ORDER — ENOXAPARIN SODIUM 100 MG/ML
40 INJECTION SUBCUTANEOUS DAILY
Refills: 0 | Status: DISCONTINUED | OUTPATIENT
Start: 2021-06-07 | End: 2021-06-20

## 2021-06-07 RX ORDER — DEXTROSE 50 % IN WATER 50 %
25 SYRINGE (ML) INTRAVENOUS ONCE
Refills: 0 | Status: DISCONTINUED | OUTPATIENT
Start: 2021-06-07 | End: 2021-06-22

## 2021-06-07 RX ORDER — ATORVASTATIN CALCIUM 80 MG/1
1 TABLET, FILM COATED ORAL
Qty: 0 | Refills: 0 | DISCHARGE

## 2021-06-07 RX ORDER — SODIUM CHLORIDE 9 MG/ML
1000 INJECTION, SOLUTION INTRAVENOUS
Refills: 0 | Status: DISCONTINUED | OUTPATIENT
Start: 2021-06-07 | End: 2021-06-22

## 2021-06-07 RX ORDER — POTASSIUM CHLORIDE 20 MEQ
20 PACKET (EA) ORAL ONCE
Refills: 0 | Status: COMPLETED | OUTPATIENT
Start: 2021-06-07 | End: 2021-06-07

## 2021-06-07 RX ORDER — OXYBUTYNIN CHLORIDE 5 MG
1 TABLET ORAL
Qty: 0 | Refills: 0 | DISCHARGE

## 2021-06-07 RX ORDER — INSULIN GLARGINE 100 [IU]/ML
12 INJECTION, SOLUTION SUBCUTANEOUS AT BEDTIME
Refills: 0 | Status: DISCONTINUED | OUTPATIENT
Start: 2021-06-07 | End: 2021-06-22

## 2021-06-07 RX ORDER — RISPERIDONE 4 MG/1
1 TABLET ORAL
Qty: 0 | Refills: 0 | DISCHARGE

## 2021-06-07 RX ORDER — CHOLECALCIFEROL (VITAMIN D3) 125 MCG
1 CAPSULE ORAL
Qty: 0 | Refills: 0 | DISCHARGE

## 2021-06-07 RX ORDER — QUETIAPINE FUMARATE 200 MG/1
1 TABLET, FILM COATED ORAL
Qty: 0 | Refills: 0 | DISCHARGE

## 2021-06-07 RX ORDER — CLOPIDOGREL BISULFATE 75 MG/1
1 TABLET, FILM COATED ORAL
Qty: 0 | Refills: 0 | DISCHARGE

## 2021-06-07 RX ORDER — POTASSIUM CHLORIDE 20 MEQ
40 PACKET (EA) ORAL ONCE
Refills: 0 | Status: COMPLETED | OUTPATIENT
Start: 2021-06-07 | End: 2021-06-07

## 2021-06-07 RX ORDER — DOXEPIN HCL 100 MG
1 CAPSULE ORAL
Qty: 0 | Refills: 0 | DISCHARGE

## 2021-06-07 RX ORDER — MAGNESIUM SULFATE 500 MG/ML
2 VIAL (ML) INJECTION ONCE
Refills: 0 | Status: COMPLETED | OUTPATIENT
Start: 2021-06-07 | End: 2021-06-07

## 2021-06-07 RX ORDER — INSULIN LISPRO 100/ML
VIAL (ML) SUBCUTANEOUS
Refills: 0 | Status: DISCONTINUED | OUTPATIENT
Start: 2021-06-07 | End: 2021-06-22

## 2021-06-07 RX ADMIN — CHLORHEXIDINE GLUCONATE 1 APPLICATION(S): 213 SOLUTION TOPICAL at 05:45

## 2021-06-07 RX ADMIN — Medication 1: at 11:55

## 2021-06-07 RX ADMIN — ATORVASTATIN CALCIUM 80 MILLIGRAM(S): 80 TABLET, FILM COATED ORAL at 21:57

## 2021-06-07 RX ADMIN — Medication 4 UNIT(S): at 17:19

## 2021-06-07 RX ADMIN — Medication 40 MILLIGRAM(S): at 05:44

## 2021-06-07 RX ADMIN — INSULIN GLARGINE 12 UNIT(S): 100 INJECTION, SOLUTION SUBCUTANEOUS at 22:17

## 2021-06-07 RX ADMIN — Medication 5 MILLIGRAM(S): at 22:42

## 2021-06-07 RX ADMIN — LISINOPRIL 40 MILLIGRAM(S): 2.5 TABLET ORAL at 05:43

## 2021-06-07 RX ADMIN — Medication 10 MILLIGRAM(S): at 11:55

## 2021-06-07 RX ADMIN — RISPERIDONE 1 MILLIGRAM(S): 4 TABLET ORAL at 11:04

## 2021-06-07 RX ADMIN — Medication 10 MILLIGRAM(S): at 05:43

## 2021-06-07 RX ADMIN — Medication 40 MILLIGRAM(S): at 17:19

## 2021-06-07 RX ADMIN — BUDESONIDE AND FORMOTEROL FUMARATE DIHYDRATE 2 PUFF(S): 160; 4.5 AEROSOL RESPIRATORY (INHALATION) at 08:57

## 2021-06-07 RX ADMIN — Medication 25 GRAM(S): at 05:42

## 2021-06-07 RX ADMIN — Medication 81 MILLIGRAM(S): at 11:02

## 2021-06-07 RX ADMIN — SENNA PLUS 2 TABLET(S): 8.6 TABLET ORAL at 21:57

## 2021-06-07 RX ADMIN — BUDESONIDE AND FORMOTEROL FUMARATE DIHYDRATE 2 PUFF(S): 160; 4.5 AEROSOL RESPIRATORY (INHALATION) at 21:54

## 2021-06-07 RX ADMIN — QUETIAPINE FUMARATE 100 MILLIGRAM(S): 200 TABLET, FILM COATED ORAL at 21:57

## 2021-06-07 RX ADMIN — ENOXAPARIN SODIUM 40 MILLIGRAM(S): 100 INJECTION SUBCUTANEOUS at 11:55

## 2021-06-07 RX ADMIN — Medication 4 UNIT(S): at 11:54

## 2021-06-07 RX ADMIN — CLOPIDOGREL BISULFATE 75 MILLIGRAM(S): 75 TABLET, FILM COATED ORAL at 11:03

## 2021-06-07 RX ADMIN — RISPERIDONE 3 MILLIGRAM(S): 4 TABLET ORAL at 21:57

## 2021-06-07 RX ADMIN — POLYETHYLENE GLYCOL 3350 17 GRAM(S): 17 POWDER, FOR SOLUTION ORAL at 05:43

## 2021-06-07 RX ADMIN — Medication 1: at 08:57

## 2021-06-07 RX ADMIN — Medication 4 UNIT(S): at 08:57

## 2021-06-07 NOTE — CONSULT NOTE ADULT - ASSESSMENT
Patient is fluid overloaded on exam  Get BMP  daily   Maintain potassium >4.0, Mg >2.1  Strict intake and output  Daily weight   Will continue to follow    Acute on chronic systolic HF /fluid overload     Patient is fluid overloaded on exam  Get BMP  daily   Continue furosemide 40 mg iv BID  Continue BB  Maintain potassium >4.0, Mg >2.1  Strict intake and output  Daily weight   Will continue to follow

## 2021-06-07 NOTE — ED ADULT NURSE NOTE - CHIEF COMPLAINT QUOTE
pt has been out of her water pill for 1 week.  called pmd who did not return call. pt does not recall name of water pill.  pt has b/l le edema. did not take her htn medication today. left sided weakness from prior cva in 2014. pt speaking in full sentences. pt received 1st covid vaccine. due for 2nd vaccine this tuesday

## 2021-06-07 NOTE — PROGRESS NOTE ADULT - SUBJECTIVE AND OBJECTIVE BOX
WILLIAN MARY 56y Female  MRN#: [id number]   CODE STATUS: FULL      SUBJECTIVE  Patient is a 56y old Female who presents with a chief complaint of CHF exacerbation (2021 23:02)    Currently admitted to medicine with the primary diagnosis of CHF exacerbation    Today is hospital day 1d, and this morning she is laying in bed comfortably and reports no overnight events.     OBJECTIVE  PAST MEDICAL & SURGICAL HISTORY  Hypertension    Hyperlipidemia    Anxiety and depression    COPD, severe    CHF (congestive heart failure)    Cerebrovascular accident (CVA)  Multiple    Type 2 diabetes mellitus    CKD (chronic kidney disease), stage II    No significant past surgical history      ALLERGIES:  No Known Allergies    MEDICATIONS:  STANDING MEDICATIONS  aspirin  chewable 81 milliGRAM(s) Oral daily  atorvastatin 80 milliGRAM(s) Oral at bedtime  budesonide 160 MICROgram(s)/formoterol 4.5 MICROgram(s) Inhaler 2 Puff(s) Inhalation two times a day  busPIRone 10 milliGRAM(s) Oral three times a day  chlorhexidine 4% Liquid 1 Application(s) Topical <User Schedule>  clopidogrel Tablet 75 milliGRAM(s) Oral daily  dextrose 40% Gel 15 Gram(s) Oral once  dextrose 5%. 1000 milliLiter(s) (50 mL/Hr) IV Continuous <Continuous>  dextrose 5%. 1000 milliLiter(s) (100 mL/Hr) IV Continuous <Continuous>  dextrose 50% Injectable 25 Gram(s) IV Push once  dextrose 50% Injectable 12.5 Gram(s) IV Push once  dextrose 50% Injectable 25 Gram(s) IV Push once  furosemide   Injectable 40 milliGRAM(s) IV Push every 12 hours  glucagon  Injectable 1 milliGRAM(s) IntraMuscular once  insulin glargine Injectable (LANTUS) 12 Unit(s) SubCutaneous at bedtime  insulin lispro (ADMELOG) corrective regimen sliding scale   SubCutaneous three times a day before meals  insulin lispro Injectable (ADMELOG) 4 Unit(s) SubCutaneous three times a day before meals  lisinopril 40 milliGRAM(s) Oral daily  metoprolol succinate  milliGRAM(s) Oral daily  polyethylene glycol 3350 17 Gram(s) Oral two times a day  QUEtiapine 100 milliGRAM(s) Oral at bedtime  risperiDONE   Tablet 1 milliGRAM(s) Oral daily  risperiDONE   Tablet 3 milliGRAM(s) Oral at bedtime  senna 2 Tablet(s) Oral at bedtime    PRN MEDICATIONS  bisacodyl Suppository 10 milliGRAM(s) Rectal daily PRN      VITAL SIGNS: Last 24 Hours  T(C): 36.4 (2021 10:00), Max: 37.6 (2021 19:33)  T(F): 97.6 (2021 10:00), Max: 99.6 (2021 19:33)  HR: 99 (2021 10:00) (94 - 103)  BP: 124/81 (2021 10:00) (124/81 - 160/99)  BP(mean): --  RR: 20 (2021 10:00) (20 - 20)  SpO2: 96% (2021 05:38) (94% - 98%)    LABS:                        12.7   5.11  )-----------( 223      ( 2021 20:07 )             41.5     06-    140  |  102  |  11  ----------------------------<  166<H>  4.1   |  27  |  0.9    Ca    8.8      2021 20:07  Mg     1.6     -    TPro  5.9<L>  /  Alb  3.5  /  TBili  1.2  /  DBili  x   /  AST  14  /  ALT  11  /  AlkPhos  105  06-06      Troponin T, Serum: <0.01 ng/mL (21 @ 07:00)  Myoglobin, Serum: <21 ng/mL (21 @ 07:00)  Creatine Kinase, Serum: 71 U/L (21 @ 00:11)  Troponin T, Serum: <0.01 ng/mL (21 @ 00:11)  Troponin T, Serum: <0.01 ng/mL (21 @ 20:07)      CARDIAC MARKERS ( 2021 07:00 )  x     / <0.01 ng/mL / x     / x     / 1.3 ng/mL  CARDIAC MARKERS ( 2021 00:11 )  x     / <0.01 ng/mL / 71 U/L / x     / 1.5 ng/mL  CARDIAC MARKERS ( 2021 20:07 )  x     / <0.01 ng/mL / x     / x     / x          RADIOLOGY:    < from: Xray Chest 1 View- PORTABLE-Urgent (21 @ 20:09) >  Impression:    Cardiomegaly and bilateral interstitial opacities may reflect evidence of CHF. Viral pneumonia can have a similar appearance.      < end of copied text >      PHYSICAL EXAM:    GENERAL: NAD, well-developed, AAOx3  HEENT:  Atraumatic, Normocephalic. EOMI, conjunctiva and sclera clear, No JVD  PULMONARY: scattered coarse rhonchi; No wheeze  CARDIOVASCULAR: Regular rate and rhythm; No murmurs, rubs, or gallops  GASTROINTESTINAL: Soft, Nontender, distended?; Bowel sounds present  MUSCULOSKELETAL: no cyanosis. BL LE 1+ edema  NEUROLOGY: non-focal  SKIN: No rashes or lesions      ASSESSMENT & PLAN    56 year old female with a pmhx of chronic hypoxic respiratory failure (on 4L home O2) secondary to  HFrEF 38% (last exacerbation in ) , HTN, HLD, DM2, active smoker , CORNELIUS on CPAP, CVA with residual LE weakness (, wheelchair bound),  COPD, mood /depression presents of worsening shortness of breath and increase weight with  B/L LE x 2weeks.     #Acute on chronic hypoxic respiratory failure (on 4L at home) due to CHF exacerbation due to non compliance  #Acute on chronic diastolic HFrEF (38%)  - patient non compliant with fluids and medications  - was on 6L NC saturating 98%, currently on 4L saturating 96% (currently at baseline)  - BNP: 7726, trop negative x3  - CXR: cardiomegaly and bilateral interstitial opacities, AM cxr appears unchanged, read pending  - IV lasix 40mg q12h: monitor renal function, electrolytes, daily weight, and volume status, strict I&Os; keep I<Os and adjust diuretics accordingly, fluid restriction to 1.2L  - net neg 1300 overnight as per RN, not seen in flowsheet  - Continue lisinopril and metoprolol succinate 200mg ; monitor BP and HR  - Low salt diet, 1200ml fluid restriction  - Patient education on medication compliance  - f/u Heart failure eval  - f/u dietician consult  - f/u PT eval, pt declined therapy today    #Hypomagnesemia  - M.6 s/p 4g repletion  - fu 4PM Mg    # DM 2  - basal and nutritional insulin with sliding scale  - f/u BS and adjust if needed  - 21 A1c 6.5  - f/u lipid panel    # HLD  - c/w statin  - f/u lipid panel    # COPD (on home O2)   - not in exacerbation  - symbicort  (mentions she is not taking)    #Possibly has severe pulm HTN (possibly group 2)  - Moderate as per ECHO but low EF so probable underestimation     #CORNELIUS/OHS ? on CPAP  - ordered CPAP  - needs pulm fu    #Hx of CVA with residual LE weakness  - pt wheelchair bound  - has HHA 6hrs/per day 7 days a week  - c/w ASA/plavix/statin    #mood /depression  - c/w home meds    # DVT ppx: lovenox  # PPI ppx: NA  # DIET: DASH carb consistent, fluid restriction  # Code Status: Full Code

## 2021-06-07 NOTE — CHART NOTE - NSCHARTNOTEFT_GEN_A_CORE
Patient seen by Nocturnist today, continue IV diuresis, awaiting Heart Failure evaluation, continue strict input and outputs, discussed case with Resident Physician. Patient seen by Nocturnist today, continue IV diuresis, awaiting Heart Failure evaluation, continue strict input and outputs, pending 16:00 labs, discussed case with Resident Physician.

## 2021-06-07 NOTE — CONSULT NOTE ADULT - SUBJECTIVE AND OBJECTIVE BOX
Date of Admission:    HISTORY OF PRESENT ILLNESS:     56 year old female with a pmhx of chronic hypoxic respiratory failure (on 4L home O2) secondary to  HFrEF 38% (last exacerbation in 2020) , HTN, HLD, DM2, active smoker , CVA with residual LE weakness (2014, wheelchair bound),  COPD , mood /depression,  presents of worsening shortness of breath and increase weight with  B/L LE x 2weeks. Patient mentions that she hasnt been taking lasix for the past two weeks as she she ran out of it. Reports she is non compliant with fluid intake. Denies any chest pain, nausea, vomiting, urinary symptoms, fever, chills. Of note patient received 1 st dose of Moderna two weeks, due for 2nd shot on 8th june.       PAST MEDICAL & SURGICAL HISTORY:    Hypertension  Hyperlipidemia  Anxiety and depression  COPD, severe  CHF (congestive heart failure)  Cerebrovascular accident (CVA) Multiple  Type 2 diabetes mellitus  CKD (chronic kidney disease), stage II    No significant past surgical history        FAMILY HISTORY:  [ ] no pertinent family history of premature cardiovascular disease in first degree relatives.  Mother:   Father:   Siblings:     SOCIAL HISTORY:    [ ] Non-smoker  [ ] Smoker  [ ] Alcohol    Allergies    No Known Allergies    Intolerances    	    REVIEW OF SYSTEMS:    CONSTITUTIONAL: No fever, weight loss, or fatigue  CARDIOLOGY: denies chest pain, shortness of breath or syncopal episodes.   RESPIRATORY: denies shortness of breath  NEUROLOGICAL: NO weakness, no focal deficits to report.  ENDOCRINOLOGICAL: no recent change in diabetic medications.   GI: no BRBPR, no N,V, diarrhea.    PSYCHIATRY: normal mood and affect  HEENT: no nasal discharge, no ecchymosis  SKIN: no ecchymosis, no breakdown  MUSCULOSKELETAL: Full range of motion x4.     PHYSICAL EXAM:    General Appearance: well appearing, normal for age and gender. 	  Neck: normal JVP, no bruit.   Eyes: No xanthomalasia, Extra Ocular muscles intact.   Cardiovascular: regular rate and rhythm S1 S2, No JVD, No murmurs, No edema  Respiratory: Lungs clear to auscultation	  Psychiatry: Alert and oriented x 3, Mood & affect appropriate  Gastrointestinal:  Soft, Non-tender  Skin/Integumen: No rashes, No ecchymoses, No cyanosis	  Neurologic: Non-focal  Musculoskeletal/ extremities: Normal range of motion, No clubbing, cyanosis or edema  Vascular: Peripheral pulses palpable 2+ bilaterally      PREVIOUS DIAGNOSTIC TESTING:      TTE 2/1/9/21    Summary:   1. Moderately decreased segmental left ventricular systolic function.   2. LV Ejection Fraction by Urena's Method with a biplane EF of 38 %.   3. Spectral Doppler shows pseudonormal pattern of left ventricular myocardial filling (Grade IIdiastolic dysfunction).   4. Mild to moderate mitral valve regurgitation.   5. The mitral valve leaflets are tethered which is due to reduced systolic function and elevated LVDP.   6. Mild-moderate tricuspid regurgitation.   7. Mild aortic regurgitation.   8. Sclerotic aortic valve with normal opening.   9. Estimated pulmonary artery systolic pressure is 59.7 mmHg assuming a right atrial pressure of 15 mmHg, which is consistent with moderate pulmonary hypertension.      Home Medications:  Albuterol (Eqv-ProAir HFA) 90 mcg/inh inhalation aerosol: 2 puff(s) inhaled every 6 hours, As Needed (07 Jun 2021 11:22)  aspirin 81 mg oral tablet: 1 tab(s) orally once a day (07 Jun 2021 11:22)  BuSpar 10 mg oral tablet: 1 tab(s) orally 3 times a day (07 Jun 2021 11:22)  fenofibrate 145 mg oral tablet: 1 tab(s) orally once a day (07 Jun 2021 11:22)  glipiZIDE 10 mg oral tablet: 1 tab(s) orally 2 times a day (07 Jun 2021 11:22)  Lipitor 40 mg oral tablet: 1 tab(s) orally once a day (07 Jun 2021 11:22)  lisinopril 40 mg oral tablet: 1 tab(s) orally once a day (07 Jun 2021 11:22)  metFORMIN 1000 mg oral tablet: 1 tab(s) orally 2 times a day (07 Jun 2021 11:22)  Metoprolol Tartrate 50 mg oral tablet: 1 tab(s) orally 2 times a day (07 Jun 2021 11:22)  Norvasc 10 mg oral tablet: 1 tab(s) orally once a day (07 Jun 2021 11:22)  Plavix 75 mg oral tablet: 1 tab(s) orally once a day (07 Jun 2021 11:22)  SEROquel 100 mg oral tablet: 2 tab(s) orally once a day (at bedtime) (07 Jun 2021 11:22)  Tresiba 100 units/mL subcutaneous solution: 50 unit(s) subcutaneous once a day (07 Jun 2021 11:22)    MEDICATIONS  (STANDING):  aspirin  chewable 81 milliGRAM(s) Oral daily  atorvastatin 80 milliGRAM(s) Oral at bedtime  budesonide 160 MICROgram(s)/formoterol 4.5 MICROgram(s) Inhaler 2 Puff(s) Inhalation two times a day  busPIRone 10 milliGRAM(s) Oral three times a day  chlorhexidine 4% Liquid 1 Application(s) Topical <User Schedule>  clopidogrel Tablet 75 milliGRAM(s) Oral daily  dextrose 40% Gel 15 Gram(s) Oral once  dextrose 5%. 1000 milliLiter(s) (50 mL/Hr) IV Continuous <Continuous>  dextrose 5%. 1000 milliLiter(s) (100 mL/Hr) IV Continuous <Continuous>  dextrose 50% Injectable 25 Gram(s) IV Push once  dextrose 50% Injectable 12.5 Gram(s) IV Push once  dextrose 50% Injectable 25 Gram(s) IV Push once  enoxaparin Injectable 40 milliGRAM(s) SubCutaneous daily  furosemide   Injectable 40 milliGRAM(s) IV Push every 12 hours  glucagon  Injectable 1 milliGRAM(s) IntraMuscular once  insulin glargine Injectable (LANTUS) 12 Unit(s) SubCutaneous at bedtime  insulin lispro (ADMELOG) corrective regimen sliding scale   SubCutaneous three times a day before meals  insulin lispro Injectable (ADMELOG) 4 Unit(s) SubCutaneous three times a day before meals  lisinopril 40 milliGRAM(s) Oral daily  metoprolol succinate  milliGRAM(s) Oral daily  polyethylene glycol 3350 17 Gram(s) Oral two times a day  QUEtiapine 100 milliGRAM(s) Oral at bedtime  risperiDONE   Tablet 1 milliGRAM(s) Oral daily  risperiDONE   Tablet 3 milliGRAM(s) Oral at bedtime  senna 2 Tablet(s) Oral at bedtime    MEDICATIONS  (PRN):  bisacodyl Suppository 10 milliGRAM(s) Rectal daily PRN Constipation         Date of Admission:    HISTORY OF PRESENT ILLNESS:     56 year old female with a pmhx of chronic hypoxic respiratory failure (on 4L home O2) secondary to  HFrEF 38% (last exacerbation in ) , HTN, HLD, DM2, active smoker , CVA with residual LE weakness (, wheelchair bound),  COPD , mood /depression,  presents of worsening shortness of breath and increase weight with  B/L LE x 2weeks. Patient mentions that she hasn't been taking Lasix for the past two weeks as she ran out of it. Reports she is non compliant with fluid intake. Denies any chest pain, nausea, vomiting, urinary symptoms, fever, chills. Of note patient received 1 st dose of Moderna two weeks, due for 2nd shot on .    Patient reports worsening SOB for the past few weeks, now feeling better after diuresis. Patient endorses non-compliance with low sodium diet and was not aware she did not receive her Lasix on her medication package from the pharmacy. Patient denies c/p, palpitations, headaches, bleeding, LH, dizziness, orthopnea, PND, LOC, cough, feeling bloated, N/V/D.            PAST MEDICAL & SURGICAL HISTORY:    Hypertension  Hyperlipidemia  Anxiety and depression  COPD, severe  CHF (congestive heart failure)  Cerebrovascular accident (CVA) Multiple  Type 2 diabetes mellitus  CKD (chronic kidney disease), stage II    No significant past surgical history        FAMILY HISTORY:    No pertinent family history of premature cardiovascular disease in first degree relatives.  Mother:  of cancer at 65  Father:  of an overdose at 50    SOCIAL HISTORY:    Smokes at least a cigarette a day, denies alcohol or drug use, lives alone    Allergies    No Known Allergies    Intolerances    	    REVIEW OF SYSTEMS:    CONSTITUTIONAL: No fever, weight loss, or fatigue  CARDIOLOGY: denies chest pain,  + shortness of breath, syncopal episodes.   RESPIRATORY: + shortness of breath  NEUROLOGICAL: LE weakness  ENDOCRINOLOGICAL: no recent change in diabetic medications.   GI: no BRBPR, no N,V, diarrhea.    PSYCHIATRY: normal mood and affect  HEENT: no nasal discharge, no ecchymosis  SKIN: no ecchymosis, no breakdown  MUSCULOSKELETAL: LE weakness     PHYSICAL EXAM:    General Appearance: well appearing, normal for age and gender. 	  Neck: normal JVP, no bruit.   Eyes:  Extra Ocular muscles intact.   Cardiovascular: regular rate and rhythm S1 S2, + JVD, No murmurs, ble edema  Respiratory: Lungs clear to auscultation	  Psychiatry: Alert and oriented x 3, Mood & affect appropriate  Gastrointestinal:  Soft, Non-tender  Skin/Integumen: No rashes, No ecchymoses, No cyanosis	  Neurologic: Non-focal  Musculoskeletal/ extremities: Normal range of motion, No clubbing, cyanosis ble edema  Vascular: Peripheral pulses palpable 2+ bilaterally      PREVIOUS DIAGNOSTIC TESTING:      TTE     Summary:   1. Moderately decreased segmental left ventricular systolic function.   2. LV Ejection Fraction by Urena's Method with a biplane EF of 38 %.   3. Spectral Doppler shows pseudonormal pattern of left ventricular myocardial filling (Grade IIdiastolic dysfunction).   4. Mild to moderate mitral valve regurgitation.   5. The mitral valve leaflets are tethered which is due to reduced systolic function and elevated LVDP.   6. Mild-moderate tricuspid regurgitation.   7. Mild aortic regurgitation.   8. Sclerotic aortic valve with normal opening.   9. Estimated pulmonary artery systolic pressure is 59.7 mmHg assuming a right atrial pressure of 15 mmHg, which is consistent with moderate pulmonary hypertension.      Home Medications:  Albuterol (Eqv-ProAir HFA) 90 mcg/inh inhalation aerosol: 2 puff(s) inhaled every 6 hours, As Needed (2021 11:22)  aspirin 81 mg oral tablet: 1 tab(s) orally once a day (2021 11:22)  BuSpar 10 mg oral tablet: 1 tab(s) orally 3 times a day (2021 11:22)  fenofibrate 145 mg oral tablet: 1 tab(s) orally once a day (2021 11:22)  glipiZIDE 10 mg oral tablet: 1 tab(s) orally 2 times a day (2021 11:22)  Lipitor 40 mg oral tablet: 1 tab(s) orally once a day (2021 11:22)  lisinopril 40 mg oral tablet: 1 tab(s) orally once a day (2021 11:22)  metFORMIN 1000 mg oral tablet: 1 tab(s) orally 2 times a day (2021 11:22)  Metoprolol Tartrate 50 mg oral tablet: 1 tab(s) orally 2 times a day (2021 11:22)  Norvasc 10 mg oral tablet: 1 tab(s) orally once a day (2021 11:22)  Plavix 75 mg oral tablet: 1 tab(s) orally once a day (2021 11:22)  SEROquel 100 mg oral tablet: 2 tab(s) orally once a day (at bedtime) (2021 11:22)  Tresiba 100 units/mL subcutaneous solution: 50 unit(s) subcutaneous once a day (2021 11:22)    MEDICATIONS  (STANDING):  aspirin  chewable 81 milliGRAM(s) Oral daily  atorvastatin 80 milliGRAM(s) Oral at bedtime  budesonide 160 MICROgram(s)/formoterol 4.5 MICROgram(s) Inhaler 2 Puff(s) Inhalation two times a day  busPIRone 10 milliGRAM(s) Oral three times a day  chlorhexidine 4% Liquid 1 Application(s) Topical <User Schedule>  clopidogrel Tablet 75 milliGRAM(s) Oral daily  dextrose 40% Gel 15 Gram(s) Oral once  dextrose 5%. 1000 milliLiter(s) (50 mL/Hr) IV Continuous <Continuous>  dextrose 5%. 1000 milliLiter(s) (100 mL/Hr) IV Continuous <Continuous>  dextrose 50% Injectable 25 Gram(s) IV Push once  dextrose 50% Injectable 12.5 Gram(s) IV Push once  dextrose 50% Injectable 25 Gram(s) IV Push once  enoxaparin Injectable 40 milliGRAM(s) SubCutaneous daily  furosemide   Injectable 40 milliGRAM(s) IV Push every 12 hours  glucagon  Injectable 1 milliGRAM(s) IntraMuscular once  insulin glargine Injectable (LANTUS) 12 Unit(s) SubCutaneous at bedtime  insulin lispro (ADMELOG) corrective regimen sliding scale   SubCutaneous three times a day before meals  insulin lispro Injectable (ADMELOG) 4 Unit(s) SubCutaneous three times a day before meals  lisinopril 40 milliGRAM(s) Oral daily  metoprolol succinate  milliGRAM(s) Oral daily  polyethylene glycol 3350 17 Gram(s) Oral two times a day  QUEtiapine 100 milliGRAM(s) Oral at bedtime  risperiDONE   Tablet 1 milliGRAM(s) Oral daily  risperiDONE   Tablet 3 milliGRAM(s) Oral at bedtime  senna 2 Tablet(s) Oral at bedtime    MEDICATIONS  (PRN):  bisacodyl Suppository 10 milliGRAM(s) Rectal daily PRN Constipation

## 2021-06-08 LAB
A1C WITH ESTIMATED AVERAGE GLUCOSE RESULT: 6.8 % — HIGH (ref 4–5.6)
ANION GAP SERPL CALC-SCNC: 13 MMOL/L — SIGNIFICANT CHANGE UP (ref 7–14)
BASOPHILS # BLD AUTO: 0.05 K/UL — SIGNIFICANT CHANGE UP (ref 0–0.2)
BASOPHILS NFR BLD AUTO: 1.2 % — HIGH (ref 0–1)
BUN SERPL-MCNC: 15 MG/DL — SIGNIFICANT CHANGE UP (ref 10–20)
CALCIUM SERPL-MCNC: 8.6 MG/DL — SIGNIFICANT CHANGE UP (ref 8.5–10.1)
CHLORIDE SERPL-SCNC: 102 MMOL/L — SIGNIFICANT CHANGE UP (ref 98–110)
CO2 SERPL-SCNC: 26 MMOL/L — SIGNIFICANT CHANGE UP (ref 17–32)
CREAT SERPL-MCNC: 1.1 MG/DL — SIGNIFICANT CHANGE UP (ref 0.7–1.5)
EOSINOPHIL # BLD AUTO: 0.07 K/UL — SIGNIFICANT CHANGE UP (ref 0–0.7)
EOSINOPHIL NFR BLD AUTO: 1.6 % — SIGNIFICANT CHANGE UP (ref 0–8)
ESTIMATED AVERAGE GLUCOSE: 148 MG/DL — HIGH (ref 68–114)
GLUCOSE BLDC GLUCOMTR-MCNC: 135 MG/DL — HIGH (ref 70–99)
GLUCOSE BLDC GLUCOMTR-MCNC: 142 MG/DL — HIGH (ref 70–99)
GLUCOSE BLDC GLUCOMTR-MCNC: 147 MG/DL — HIGH (ref 70–99)
GLUCOSE BLDC GLUCOMTR-MCNC: 155 MG/DL — HIGH (ref 70–99)
GLUCOSE BLDC GLUCOMTR-MCNC: 202 MG/DL — HIGH (ref 70–99)
GLUCOSE SERPL-MCNC: 197 MG/DL — HIGH (ref 70–99)
HCT VFR BLD CALC: 39.9 % — SIGNIFICANT CHANGE UP (ref 37–47)
HGB BLD-MCNC: 12.2 G/DL — SIGNIFICANT CHANGE UP (ref 12–16)
IMM GRANULOCYTES NFR BLD AUTO: 0.7 % — HIGH (ref 0.1–0.3)
LYMPHOCYTES # BLD AUTO: 1.54 K/UL — SIGNIFICANT CHANGE UP (ref 1.2–3.4)
LYMPHOCYTES # BLD AUTO: 35.7 % — SIGNIFICANT CHANGE UP (ref 20.5–51.1)
MAGNESIUM SERPL-MCNC: 2.1 MG/DL — SIGNIFICANT CHANGE UP (ref 1.8–2.4)
MCHC RBC-ENTMCNC: 27.9 PG — SIGNIFICANT CHANGE UP (ref 27–31)
MCHC RBC-ENTMCNC: 30.6 G/DL — LOW (ref 32–37)
MCV RBC AUTO: 91.3 FL — SIGNIFICANT CHANGE UP (ref 81–99)
MONOCYTES # BLD AUTO: 0.23 K/UL — SIGNIFICANT CHANGE UP (ref 0.1–0.6)
MONOCYTES NFR BLD AUTO: 5.3 % — SIGNIFICANT CHANGE UP (ref 1.7–9.3)
NEUTROPHILS # BLD AUTO: 2.39 K/UL — SIGNIFICANT CHANGE UP (ref 1.4–6.5)
NEUTROPHILS NFR BLD AUTO: 55.5 % — SIGNIFICANT CHANGE UP (ref 42.2–75.2)
NRBC # BLD: 0 /100 WBCS — SIGNIFICANT CHANGE UP (ref 0–0)
PHOSPHATE SERPL-MCNC: 3.7 MG/DL — SIGNIFICANT CHANGE UP (ref 2.1–4.9)
PLATELET # BLD AUTO: 208 K/UL — SIGNIFICANT CHANGE UP (ref 130–400)
POTASSIUM SERPL-MCNC: 4.1 MMOL/L — SIGNIFICANT CHANGE UP (ref 3.5–5)
POTASSIUM SERPL-SCNC: 4.1 MMOL/L — SIGNIFICANT CHANGE UP (ref 3.5–5)
RBC # BLD: 4.37 M/UL — SIGNIFICANT CHANGE UP (ref 4.2–5.4)
RBC # FLD: 18.8 % — HIGH (ref 11.5–14.5)
SODIUM SERPL-SCNC: 141 MMOL/L — SIGNIFICANT CHANGE UP (ref 135–146)
WBC # BLD: 4.31 K/UL — LOW (ref 4.8–10.8)
WBC # FLD AUTO: 4.31 K/UL — LOW (ref 4.8–10.8)

## 2021-06-08 PROCEDURE — 99233 SBSQ HOSP IP/OBS HIGH 50: CPT

## 2021-06-08 PROCEDURE — 93010 ELECTROCARDIOGRAM REPORT: CPT

## 2021-06-08 RX ORDER — METOPROLOL TARTRATE 50 MG
50 TABLET ORAL ONCE
Refills: 0 | Status: COMPLETED | OUTPATIENT
Start: 2021-06-08 | End: 2021-06-08

## 2021-06-08 RX ADMIN — Medication 40 MILLIGRAM(S): at 05:23

## 2021-06-08 RX ADMIN — BUDESONIDE AND FORMOTEROL FUMARATE DIHYDRATE 2 PUFF(S): 160; 4.5 AEROSOL RESPIRATORY (INHALATION) at 08:04

## 2021-06-08 RX ADMIN — Medication 4 UNIT(S): at 07:56

## 2021-06-08 RX ADMIN — Medication 10 MILLIGRAM(S): at 11:52

## 2021-06-08 RX ADMIN — Medication 40 MILLIGRAM(S): at 17:02

## 2021-06-08 RX ADMIN — Medication 4 UNIT(S): at 16:44

## 2021-06-08 RX ADMIN — Medication 4 UNIT(S): at 11:49

## 2021-06-08 RX ADMIN — CLOPIDOGREL BISULFATE 75 MILLIGRAM(S): 75 TABLET, FILM COATED ORAL at 11:51

## 2021-06-08 RX ADMIN — RISPERIDONE 3 MILLIGRAM(S): 4 TABLET ORAL at 22:01

## 2021-06-08 RX ADMIN — ATORVASTATIN CALCIUM 80 MILLIGRAM(S): 80 TABLET, FILM COATED ORAL at 22:01

## 2021-06-08 RX ADMIN — Medication 200 MILLIGRAM(S): at 05:22

## 2021-06-08 RX ADMIN — Medication 10 MILLIGRAM(S): at 01:25

## 2021-06-08 RX ADMIN — Medication 2: at 07:56

## 2021-06-08 RX ADMIN — QUETIAPINE FUMARATE 100 MILLIGRAM(S): 200 TABLET, FILM COATED ORAL at 22:01

## 2021-06-08 RX ADMIN — INSULIN GLARGINE 12 UNIT(S): 100 INJECTION, SOLUTION SUBCUTANEOUS at 22:02

## 2021-06-08 RX ADMIN — Medication 40 MILLIEQUIVALENT(S): at 01:26

## 2021-06-08 RX ADMIN — CHLORHEXIDINE GLUCONATE 1 APPLICATION(S): 213 SOLUTION TOPICAL at 05:21

## 2021-06-08 RX ADMIN — Medication 50 MILLIEQUIVALENT(S): at 02:00

## 2021-06-08 RX ADMIN — LISINOPRIL 40 MILLIGRAM(S): 2.5 TABLET ORAL at 05:22

## 2021-06-08 RX ADMIN — Medication 25 GRAM(S): at 01:48

## 2021-06-08 RX ADMIN — Medication 81 MILLIGRAM(S): at 11:51

## 2021-06-08 RX ADMIN — Medication 50 MILLIGRAM(S): at 02:02

## 2021-06-08 RX ADMIN — RISPERIDONE 1 MILLIGRAM(S): 4 TABLET ORAL at 11:51

## 2021-06-08 RX ADMIN — Medication 10 MILLIGRAM(S): at 22:57

## 2021-06-08 RX ADMIN — BUDESONIDE AND FORMOTEROL FUMARATE DIHYDRATE 2 PUFF(S): 160; 4.5 AEROSOL RESPIRATORY (INHALATION) at 21:57

## 2021-06-08 RX ADMIN — Medication 10 MILLIGRAM(S): at 05:21

## 2021-06-08 RX ADMIN — ENOXAPARIN SODIUM 40 MILLIGRAM(S): 100 INJECTION SUBCUTANEOUS at 11:51

## 2021-06-08 NOTE — PROGRESS NOTE ADULT - ASSESSMENT
Acute on chronic systolic HF /fluid overload     Patient is fluid overloaded on exam  Get BMP  daily   Continue furosemide 40 mg iv BID  Continue BB  Maintain potassium >4.0, Mg >2.1  Strict intake and output  Daily weight   Will continue to follow    Acute on chronic systolic HF /fluid overload     Patient is fluid overloaded on exam  Get BMP  daily   Continue furosemide 40 mg iv BID  Continue BB  Maintain potassium >4.0, Mg >2.1  Strict intake and output  Daily weight   Will continue to follow

## 2021-06-08 NOTE — PROGRESS NOTE ADULT - SUBJECTIVE AND OBJECTIVE BOX
WILLIAN MARY 56y Female  MRN#: [id number]   CODE STATUS:FULL      SUBJECTIVE  Patient is a 56y old Female who presents with a chief complaint of CHF exacerbation (07 Jun 2021 11:33)  Currently admitted to medicine with the primary diagnosis of CHF exacerbation    Patient examined at bedside. Still overloaded. With some tachycardia overnight. No other events.    OBJECTIVE  PAST MEDICAL & SURGICAL HISTORY  Hypertension    Hyperlipidemia    Anxiety and depression    COPD, severe    CHF (congestive heart failure)    Cerebrovascular accident (CVA)  Multiple    Type 2 diabetes mellitus    CKD (chronic kidney disease), stage II    No significant past surgical history      ALLERGIES:  No Known Allergies    MEDICATIONS:  STANDING MEDICATIONS  aspirin  chewable 81 milliGRAM(s) Oral daily  atorvastatin 80 milliGRAM(s) Oral at bedtime  budesonide 160 MICROgram(s)/formoterol 4.5 MICROgram(s) Inhaler 2 Puff(s) Inhalation two times a day  busPIRone 10 milliGRAM(s) Oral three times a day  chlorhexidine 4% Liquid 1 Application(s) Topical <User Schedule>  clopidogrel Tablet 75 milliGRAM(s) Oral daily  dextrose 40% Gel 15 Gram(s) Oral once  dextrose 5%. 1000 milliLiter(s) IV Continuous <Continuous>  dextrose 5%. 1000 milliLiter(s) IV Continuous <Continuous>  dextrose 50% Injectable 25 Gram(s) IV Push once  dextrose 50% Injectable 12.5 Gram(s) IV Push once  dextrose 50% Injectable 25 Gram(s) IV Push once  enoxaparin Injectable 40 milliGRAM(s) SubCutaneous daily  furosemide   Injectable 40 milliGRAM(s) IV Push every 12 hours  glucagon  Injectable 1 milliGRAM(s) IntraMuscular once  insulin glargine Injectable (LANTUS) 12 Unit(s) SubCutaneous at bedtime  insulin lispro (ADMELOG) corrective regimen sliding scale   SubCutaneous three times a day before meals  insulin lispro Injectable (ADMELOG) 4 Unit(s) SubCutaneous three times a day before meals  lisinopril 40 milliGRAM(s) Oral daily  metoprolol succinate  milliGRAM(s) Oral daily  polyethylene glycol 3350 17 Gram(s) Oral two times a day  QUEtiapine 100 milliGRAM(s) Oral at bedtime  risperiDONE   Tablet 1 milliGRAM(s) Oral daily  risperiDONE   Tablet 3 milliGRAM(s) Oral at bedtime  senna 2 Tablet(s) Oral at bedtime    PRN MEDICATIONS  bisacodyl Suppository 10 milliGRAM(s) Rectal daily PRN      VITAL SIGNS: Last 24 Hours  T(C): 36.2 (08 Jun 2021 05:10), Max: 37.2 (07 Jun 2021 20:55)  T(F): 97.2 (08 Jun 2021 05:10), Max: 99 (07 Jun 2021 20:55)  HR: 98 (08 Jun 2021 05:10) (98 - 157)  BP: 125/82 (08 Jun 2021 05:10) (125/82 - 179/88)  BP(mean): 115 (07 Jun 2021 22:31) (115 - 115)  RR: 18 (08 Jun 2021 05:10) (18 - 19)  SpO2: 96% (08 Jun 2021 08:09) (94% - 98%)    LABS:                        12.2   4.31  )-----------( 208      ( 08 Jun 2021 06:16 )             39.9     06-08    141  |  102  |  15  ----------------------------<  197<H>  4.1   |  26  |  1.1    Ca    8.6      08 Jun 2021 06:16  Phos  3.7     06-08  Mg     2.1     06-08    TPro  5.9<L>  /  Alb  3.5  /  TBili  1.2  /  DBili  x   /  AST  14  /  ALT  11  /  AlkPhos  105  06-06              CARDIAC MARKERS ( 07 Jun 2021 07:00 )  x     / <0.01 ng/mL / x     / x     / 1.3 ng/mL  CARDIAC MARKERS ( 07 Jun 2021 00:11 )  x     / <0.01 ng/mL / 71 U/L / x     / 1.5 ng/mL  CARDIAC MARKERS ( 06 Jun 2021 20:07 )  x     / <0.01 ng/mL / x     / x     / x            PHYSICAL EXAM:    GENERAL: NAD, overweight, well-developed, AAOx3  HEENT:  Atraumatic, Normocephalic. EOMI, PERRLA, conjunctiva and sclera clear, No JVD  PULMONARY: Clear to auscultation bilaterally; Crackles bilateral bases  CARDIOVASCULAR: Regular rate and rhythm; No murmurs, rubs, or gallops  GASTROINTESTINAL: Soft, Nontender, Nondistended; Bowel sounds present  MUSCULOSKELETAL:  +2 edema bilaterally lower extremities  NEUROLOGY: Weakness of L lower extremity  SKIN: No rashes or lesions      ASSESSMENT & PLAN    Patient is a 56 year old woman with PMH chronic hypoxic respiratory failure (on 4L home O2) secondary to  HFrEF 38%, HTN, HLD, DM2, CORNELIUS on CPAP, CVA with residual LE weakness (2014, wheelchair bound),  COPD, depression who presents of worsening shortness of breath and increase weight with  B/L LE x 2weeks. Admitted for acute on chronic CHF exacerbation.    1) Acute on chronic CHF exacerbation with resolved acute on chronic hypoxic respiratory failure  - Currently on 3L NC (on 4L at home)  - BNP 6/6: 7726  - CXR 6/6: cardiomegaly and bilateral interstitial opacities  - Continue IV lasix 40mg q12h  - Daily weights/ strict I+O/ fluid restrict  - Continue lisinopril 40mg q24hrs, metoprolol succinate 200mg q24hrs  - Heart failure consult appreciated   - Echo 2/2020: EF 38%, grade II diastolic dysfunction    2) DM II  - Lantus 12 qhs, lispro 4/4/4  - 6/7/21 A1c 6.5    3) HLD  - Continue atorvastatin 80mg qhs  - Lipid panel: LDL 42, HDL 29, triglycerides 183     4) COPD/pulmonary HTN  -On 4L home O2, tolerating 3L here  - Continue inhalers    5) CORNELIUS/OHS ? on CPAP  - CPAP at night  - Outpatient pulm follow-up    6) CVA with residual LE weakness  - c/w ASA/plavix/statin    7) Depression/ mood disorder  - Continue risperidone 3mg qhs, quetiapine 100mg qhs, buspirone 10mg TID    MISC:  - DVT ppx: lovenox  - PPI ppx: NA  - DIET: DASH carb consistent, fluid restriction  - Code Status: Full Code

## 2021-06-08 NOTE — DIETITIAN INITIAL EVALUATION ADULT. - PERTINENT MEDS FT
MEDICATIONS  (STANDING):  aspirin  chewable 81 milliGRAM(s) Oral daily  atorvastatin 80 milliGRAM(s) Oral at bedtime  budesonide 160 MICROgram(s)/formoterol 4.5 MICROgram(s) Inhaler 2 Puff(s) Inhalation two times a day  busPIRone 10 milliGRAM(s) Oral three times a day  chlorhexidine 4% Liquid 1 Application(s) Topical <User Schedule>  clopidogrel Tablet 75 milliGRAM(s) Oral daily  dextrose 40% Gel 15 Gram(s) Oral once  dextrose 5%. 1000 milliLiter(s) (50 mL/Hr) IV Continuous <Continuous>  dextrose 5%. 1000 milliLiter(s) (100 mL/Hr) IV Continuous <Continuous>  dextrose 50% Injectable 25 Gram(s) IV Push once  dextrose 50% Injectable 12.5 Gram(s) IV Push once  dextrose 50% Injectable 25 Gram(s) IV Push once  enoxaparin Injectable 40 milliGRAM(s) SubCutaneous daily  furosemide   Injectable 40 milliGRAM(s) IV Push every 12 hours  glucagon  Injectable 1 milliGRAM(s) IntraMuscular once  insulin glargine Injectable (LANTUS) 12 Unit(s) SubCutaneous at bedtime  insulin lispro (ADMELOG) corrective regimen sliding scale   SubCutaneous three times a day before meals  insulin lispro Injectable (ADMELOG) 4 Unit(s) SubCutaneous three times a day before meals  lisinopril 40 milliGRAM(s) Oral daily  metoprolol succinate  milliGRAM(s) Oral daily  polyethylene glycol 3350 17 Gram(s) Oral two times a day  QUEtiapine 100 milliGRAM(s) Oral at bedtime  risperiDONE   Tablet 1 milliGRAM(s) Oral daily  risperiDONE   Tablet 3 milliGRAM(s) Oral at bedtime  senna 2 Tablet(s) Oral at bedtime

## 2021-06-08 NOTE — DIETITIAN INITIAL EVALUATION ADULT. - ORAL INTAKE PTA/DIET HISTORY
pt reports she has good appetite PTA. Aware of diabetes diet but does not follow strictly. No chewing/swallowing issues. NKFA. Does not take vitamins PTA. No cultural/Spiritism food preferences.

## 2021-06-08 NOTE — DIETITIAN INITIAL EVALUATION ADULT. - FACTORS AFF FOOD INTAKE
This patient was assisted with Intentional Toileting every 2 hours during this shift. Documentation of ambulation and output reflected on Flowsheet. Pt reports eating great. PO intake 75% per flow sheets. Last BM 6/8 per pt.

## 2021-06-08 NOTE — DIETITIAN INITIAL EVALUATION ADULT. - PERSON TAUGHT/METHOD
provided heart healthy DM diet guidelines. Reviewed importance of low sodium diet, daily weights, and keeping blood sugars normal./verbal instruction/written material/patient instructed

## 2021-06-08 NOTE — DIETITIAN INITIAL EVALUATION ADULT. - PHYSCIAL ASSESSMENT
2+ edema b/l ankle, foot and redness blanchable to b/l buttocks/heel per flow sheets. Pt declined NFPE. Reports she weighed 180# a couple weeks ago and has gone up to 200# d/t fluid retention/obese

## 2021-06-08 NOTE — DIETITIAN INITIAL EVALUATION ADULT. - OTHER CALCULATIONS
Calories; 1325 - 1457 kcals (MSJ x 1.0 - 1.1 AF obesity) Protein: 65 - 81 gms (0.8 - 1.0 gm/kg CBW obesity) Fluid 1.2 L per LIP

## 2021-06-08 NOTE — PROGRESS NOTE ADULT - SUBJECTIVE AND OBJECTIVE BOX
CHIEF COMPLAINT:    Patient is a 56y old  Female who presents with a chief complaint of CHF exacerbation     INTERVAL HPI/OVERNIGHT EVENTS:    Patient seen and examined at bedside. No acute overnight events occurred.    ROS: Reports b/l LE edema, no SOB. All other systems are negative.    Vital Signs:    T(F): 97.2 (21 @ 05:10), Max: 99 (21 @ 20:55)  HR: 98 (21 @ 05:10) (98 - 157)  BP: 125/82 (21 @ 05:10) (125/82 - 179/88)  RR: 18 (21 @ 05:10) (18 - 19)  SpO2: 96% (21 @ 08:09) (94% - 98%)  I&O's Summary    2021 07:01  -  2021 07:00  --------------------------------------------------------  IN: 960 mL / OUT: 2550 mL / NET: -1590 mL    2021 07:01  -  2021 12:03  --------------------------------------------------------  IN: 200 mL / OUT: 0 mL / NET: 200 mL      Daily     Daily Weight in k.7 (2021 05:10)  CAPILLARY BLOOD GLUCOSE      POCT Blood Glucose.: 135 mg/dL (2021 11:37)  POCT Blood Glucose.: 202 mg/dL (2021 07:33)  POCT Blood Glucose.: 111 mg/dL (2021 16:41)      PHYSICAL EXAM:  GENERAL:  NAD  SKIN: No rashes or lesions  HEENT: Atraumatic. Normocephalic. Anicteric  NECK:  + JVD.   PULMONARY: Clear to ausculation bilaterally. No wheezing. No rales  CVS: Normal S1, S2. Regular rate and rhythm. No murmurs.  ABDOMEN/GI: Soft, Nontender, Nondistended; Bowel sounds are present  EXTREMITIES: 1+ pitting edema to mid calf  NEUROLOGIC:  No motor deficit.  PSYCH: Alert & oriented x 3, normal affect    Consultant(s) Notes Reviewed:  [x ] YES  [ ] NO      LABS:                        12.2   4.31  )-----------( 208      ( 2021 06:16 )             39.9     06-08    141  |  102  |  15  ----------------------------<  197<H>  4.1   |  26  |  1.1    Ca    8.6      2021 06:16  Phos  3.7     06-08  Mg     2.1     06-08    TPro  5.9<L>  /  Alb  3.5  /  TBili  1.2  /  DBili  x   /  AST  14  /  ALT  11  /  AlkPhos  105  06-06      Serum Pro-Brain Natriuretic Peptide: 7729 pg/mL (21 @ 20:07)    Trop <0.01, CKMB 1.3, CK --, 21 @ 07:00  Trop <0.01, CKMB 1.5, CK 71, 21 @ 00:11  Trop <0.01, CKMB --, CK --, 21 @ 20:07        RADIOLOGY & ADDITIONAL TESTS:  Imaging or report Personally Reviewed:  [ ] YES  [ ] NO    Telemetry reviewed independently - SVT    Medications:  Standing  aspirin  chewable 81 milliGRAM(s) Oral daily  atorvastatin 80 milliGRAM(s) Oral at bedtime  budesonide 160 MICROgram(s)/formoterol 4.5 MICROgram(s) Inhaler 2 Puff(s) Inhalation two times a day  busPIRone 10 milliGRAM(s) Oral three times a day  chlorhexidine 4% Liquid 1 Application(s) Topical <User Schedule>  clopidogrel Tablet 75 milliGRAM(s) Oral daily  dextrose 40% Gel 15 Gram(s) Oral once  dextrose 5%. 1000 milliLiter(s) IV Continuous <Continuous>  dextrose 5%. 1000 milliLiter(s) IV Continuous <Continuous>  dextrose 50% Injectable 25 Gram(s) IV Push once  dextrose 50% Injectable 12.5 Gram(s) IV Push once  dextrose 50% Injectable 25 Gram(s) IV Push once  enoxaparin Injectable 40 milliGRAM(s) SubCutaneous daily  furosemide   Injectable 40 milliGRAM(s) IV Push every 12 hours  glucagon  Injectable 1 milliGRAM(s) IntraMuscular once  insulin glargine Injectable (LANTUS) 12 Unit(s) SubCutaneous at bedtime  insulin lispro (ADMELOG) corrective regimen sliding scale   SubCutaneous three times a day before meals  insulin lispro Injectable (ADMELOG) 4 Unit(s) SubCutaneous three times a day before meals  lisinopril 40 milliGRAM(s) Oral daily  metoprolol succinate  milliGRAM(s) Oral daily  polyethylene glycol 3350 17 Gram(s) Oral two times a day  QUEtiapine 100 milliGRAM(s) Oral at bedtime  risperiDONE   Tablet 1 milliGRAM(s) Oral daily  risperiDONE   Tablet 3 milliGRAM(s) Oral at bedtime  senna 2 Tablet(s) Oral at bedtime    PRN Meds  bisacodyl Suppository 10 milliGRAM(s) Rectal daily PRN      Case discussed with resident  Care discussed with pt

## 2021-06-08 NOTE — PROGRESS NOTE ADULT - ASSESSMENT
55 yo F PMHx chronic hypoxic respiratory failure from COPD (4L home O2), HFrEF w/ moderate pulmonary HTN, HTN, HLD, DM II, CVA (residual LE weakness; wheelchair-bound), depression presented via ambulance BIBEMS for eval of progressively worsening SOB for the past x 2 weeks. Associated with B/L LE swelling. + orthopnea, + non-productive cough.     Pt got a medication refill from her PMD. She gets her medications in a blister pack and thought lasix was in there. When she started developing symptoms of CHF exacerbation she looked and realized that lasix was not included in the packet.     In the ED, Chest X-ray shows pulm vascular congestion and cardiomegaly. BNP elevated. Started on IV Lasix. Pt hypoxic to 94% on NC, escalated to NRB and descalated back to NC.       Acute on chronic hypoxic respiratory failure  - due to acute on chronic HFrEF w/ mod pulm HTN from medication error  - c/w IV Lasix as pt is still edematous  - net negative 1,950 cc    Qtc prolongation  - remains prolonged  - c/w telemetry   - c/w mg supplementation  - if qtc remains prolonged c/s psych to d/c depression meds    Magnesium deficiency.  - replenish    HTN  - c/w lisinopril, lasix, metoprolol  - controlled    DM II  - FS goal 110-180  - controlled    CVA  - has residual weakness and is wheelchair bound  - c/w aspirin, plavix    Depression  - c/w wellbutrin, risperidone    Full Code  From home     #Progress Note Handoff:  Pending (specify):  adequate diuresis  Family discussion: discussed continued diuresis, code status  Disposition: Home_x__/SNF___/Other________/Unknown at this time________

## 2021-06-08 NOTE — DIETITIAN INITIAL EVALUATION ADULT. - OTHER INFO
Admit for CHF exacerbation. on IV lasix. seen by heart failure team. DM II on insulin. HLD on lipitor.  CVA with residual LE weakness.

## 2021-06-08 NOTE — PROGRESS NOTE ADULT - SUBJECTIVE AND OBJECTIVE BOX
Date of Admission:    Interval History:    - Patient reports feeling less SOB today  - Remains fluid overloaded on exam    HISTORY OF PRESENT ILLNESS:     56 year old female with a pmhx of chronic hypoxic respiratory failure (on 4L home O2) secondary to  HFrEF 38% (last exacerbation in ) , HTN, HLD, DM2, active smoker , CVA with residual LE weakness (2014, wheelchair bound),  COPD , mood /depression,  presents of worsening shortness of breath and increase weight with  B/L LE x 2weeks. Patient mentions that she hasn't been taking Lasix for the past two weeks as she ran out of it. Reports she is non compliant with fluid intake. Denies any chest pain, nausea, vomiting, urinary symptoms, fever, chills. Of note patient received 1 st dose of Moderna two weeks, due for 2nd shot on .    Patient reports worsening SOB for the past few weeks, now feeling better after diuresis. Patient endorses non-compliance with low sodium diet and was not aware she did not receive her Lasix on her medication package from the pharmacy. Patient denies c/p, palpitations, headaches, bleeding, LH, dizziness, orthopnea, PND, LOC, cough, feeling bloated, N/V/D.        PAST MEDICAL & SURGICAL HISTORY:    Hypertension  Hyperlipidemia  Anxiety and depression  COPD, severe  CHF (congestive heart failure)  Cerebrovascular accident (CVA) Multiple  Type 2 diabetes mellitus  CKD (chronic kidney disease), stage II    No significant past surgical history        FAMILY HISTORY:    No pertinent family history of premature cardiovascular disease in first degree relatives.  Mother:  of cancer at 65  Father:  of an overdose at 50    SOCIAL HISTORY:    Smokes at least a cigarette a day, denies alcohol or drug use, lives alone    Allergies    No Known Allergies    Intolerances      PHYSICAL EXAM:    General Appearance: well appearing, normal for age and gender. 	  Neck: normal JVP, no bruit.   Cardiovascular: regular rate and rhythm S1 S2, + JVD, No murmurs, ble edema  Respiratory: Lungs clear to auscultation	  Psychiatry: Alert and oriented x 3, Mood & affect appropriate  Gastrointestinal:  Soft, Non-tender  Musculoskeletal/ extremities: Normal range of motion, No clubbing, cyanosis ble edema  Vascular: Peripheral pulses palpable 2+ bilaterally      PREVIOUS DIAGNOSTIC TESTING:      TTE     Summary:   1. Moderately decreased segmental left ventricular systolic function.   2. LV Ejection Fraction by Urena's Method with a biplane EF of 38 %.   3. Spectral Doppler shows pseudonormal pattern of left ventricular myocardial filling (Grade IIdiastolic dysfunction).   4. Mild to moderate mitral valve regurgitation.   5. The mitral valve leaflets are tethered which is due to reduced systolic function and elevated LVDP.   6. Mild-moderate tricuspid regurgitation.   7. Mild aortic regurgitation.   8. Sclerotic aortic valve with normal opening.   9. Estimated pulmonary artery systolic pressure is 59.7 mmHg assuming a right atrial pressure of 15 mmHg, which is consistent with moderate pulmonary hypertension.      Home Medications:  Albuterol (Eqv-ProAir HFA) 90 mcg/inh inhalation aerosol: 2 puff(s) inhaled every 6 hours, As Needed (2021 11:22)  aspirin 81 mg oral tablet: 1 tab(s) orally once a day (:)  BuSpar 10 mg oral tablet: 1 tab(s) orally 3 times a day (:)  fenofibrate 145 mg oral tablet: 1 tab(s) orally once a day (:)  glipiZIDE 10 mg oral tablet: 1 tab(s) orally 2 times a day (:22)  Lipitor 40 mg oral tablet: 1 tab(s) orally once a day (:22)  lisinopril 40 mg oral tablet: 1 tab(s) orally once a day (:22)  metFORMIN 1000 mg oral tablet: 1 tab(s) orally 2 times a day (2021 11:22)  Metoprolol Tartrate 50 mg oral tablet: 1 tab(s) orally 2 times a day (:22)  Norvasc 10 mg oral tablet: 1 tab(s) orally once a day (2021 11:22)  Plavix 75 mg oral tablet: 1 tab(s) orally once a day (:)  SEROquel 100 mg oral tablet: 2 tab(s) orally once a day (at bedtime) (:)  Tresiba 100 units/mL subcutaneous solution: 50 unit(s) subcutaneous once a day (2021 11:22)    MEDICATIONS  (STANDING):  aspirin  chewable 81 milliGRAM(s) Oral daily  atorvastatin 80 milliGRAM(s) Oral at bedtime  budesonide 160 MICROgram(s)/formoterol 4.5 MICROgram(s) Inhaler 2 Puff(s) Inhalation two times a day  busPIRone 10 milliGRAM(s) Oral three times a day  chlorhexidine 4% Liquid 1 Application(s) Topical <User Schedule>  clopidogrel Tablet 75 milliGRAM(s) Oral daily  dextrose 40% Gel 15 Gram(s) Oral once  dextrose 5%. 1000 milliLiter(s) (50 mL/Hr) IV Continuous <Continuous>  dextrose 5%. 1000 milliLiter(s) (100 mL/Hr) IV Continuous <Continuous>  dextrose 50% Injectable 25 Gram(s) IV Push once  dextrose 50% Injectable 12.5 Gram(s) IV Push once  dextrose 50% Injectable 25 Gram(s) IV Push once  enoxaparin Injectable 40 milliGRAM(s) SubCutaneous daily  furosemide   Injectable 40 milliGRAM(s) IV Push every 12 hours  glucagon  Injectable 1 milliGRAM(s) IntraMuscular once  insulin glargine Injectable (LANTUS) 12 Unit(s) SubCutaneous at bedtime  insulin lispro (ADMELOG) corrective regimen sliding scale   SubCutaneous three times a day before meals  insulin lispro Injectable (ADMELOG) 4 Unit(s) SubCutaneous three times a day before meals  lisinopril 40 milliGRAM(s) Oral daily  metoprolol succinate  milliGRAM(s) Oral daily  polyethylene glycol 3350 17 Gram(s) Oral two times a day  QUEtiapine 100 milliGRAM(s) Oral at bedtime  risperiDONE   Tablet 1 milliGRAM(s) Oral daily  risperiDONE   Tablet 3 milliGRAM(s) Oral at bedtime  senna 2 Tablet(s) Oral at bedtime    MEDICATIONS  (PRN):  bisacodyl Suppository 10 milliGRAM(s) Rectal daily PRN Constipation

## 2021-06-09 LAB
ALBUMIN SERPL ELPH-MCNC: 3.3 G/DL — LOW (ref 3.5–5.2)
ALP SERPL-CCNC: 99 U/L — SIGNIFICANT CHANGE UP (ref 30–115)
ALT FLD-CCNC: 8 U/L — SIGNIFICANT CHANGE UP (ref 0–41)
ANION GAP SERPL CALC-SCNC: 7 MMOL/L — SIGNIFICANT CHANGE UP (ref 7–14)
AST SERPL-CCNC: 12 U/L — SIGNIFICANT CHANGE UP (ref 0–41)
BASOPHILS # BLD AUTO: 0.06 K/UL — SIGNIFICANT CHANGE UP (ref 0–0.2)
BASOPHILS NFR BLD AUTO: 1.2 % — HIGH (ref 0–1)
BILIRUB SERPL-MCNC: 0.7 MG/DL — SIGNIFICANT CHANGE UP (ref 0.2–1.2)
BUN SERPL-MCNC: 17 MG/DL — SIGNIFICANT CHANGE UP (ref 10–20)
CALCIUM SERPL-MCNC: 8.6 MG/DL — SIGNIFICANT CHANGE UP (ref 8.5–10.1)
CHLORIDE SERPL-SCNC: 98 MMOL/L — SIGNIFICANT CHANGE UP (ref 98–110)
CO2 SERPL-SCNC: 30 MMOL/L — SIGNIFICANT CHANGE UP (ref 17–32)
CREAT SERPL-MCNC: 0.9 MG/DL — SIGNIFICANT CHANGE UP (ref 0.7–1.5)
EOSINOPHIL # BLD AUTO: 0.11 K/UL — SIGNIFICANT CHANGE UP (ref 0–0.7)
EOSINOPHIL NFR BLD AUTO: 2.1 % — SIGNIFICANT CHANGE UP (ref 0–8)
GLUCOSE BLDC GLUCOMTR-MCNC: 113 MG/DL — HIGH (ref 70–99)
GLUCOSE BLDC GLUCOMTR-MCNC: 157 MG/DL — HIGH (ref 70–99)
GLUCOSE BLDC GLUCOMTR-MCNC: 167 MG/DL — HIGH (ref 70–99)
GLUCOSE BLDC GLUCOMTR-MCNC: 318 MG/DL — HIGH (ref 70–99)
GLUCOSE SERPL-MCNC: 94 MG/DL — SIGNIFICANT CHANGE UP (ref 70–99)
HCT VFR BLD CALC: 41.1 % — SIGNIFICANT CHANGE UP (ref 37–47)
HGB BLD-MCNC: 12.7 G/DL — SIGNIFICANT CHANGE UP (ref 12–16)
IMM GRANULOCYTES NFR BLD AUTO: 0.4 % — HIGH (ref 0.1–0.3)
LYMPHOCYTES # BLD AUTO: 1.53 K/UL — SIGNIFICANT CHANGE UP (ref 1.2–3.4)
LYMPHOCYTES # BLD AUTO: 29.9 % — SIGNIFICANT CHANGE UP (ref 20.5–51.1)
MAGNESIUM SERPL-MCNC: 1.8 MG/DL — SIGNIFICANT CHANGE UP (ref 1.8–2.4)
MCHC RBC-ENTMCNC: 28 PG — SIGNIFICANT CHANGE UP (ref 27–31)
MCHC RBC-ENTMCNC: 30.9 G/DL — LOW (ref 32–37)
MCV RBC AUTO: 90.7 FL — SIGNIFICANT CHANGE UP (ref 81–99)
MONOCYTES # BLD AUTO: 0.33 K/UL — SIGNIFICANT CHANGE UP (ref 0.1–0.6)
MONOCYTES NFR BLD AUTO: 6.4 % — SIGNIFICANT CHANGE UP (ref 1.7–9.3)
NEUTROPHILS # BLD AUTO: 3.07 K/UL — SIGNIFICANT CHANGE UP (ref 1.4–6.5)
NEUTROPHILS NFR BLD AUTO: 60 % — SIGNIFICANT CHANGE UP (ref 42.2–75.2)
NRBC # BLD: 0 /100 WBCS — SIGNIFICANT CHANGE UP (ref 0–0)
PLATELET # BLD AUTO: 204 K/UL — SIGNIFICANT CHANGE UP (ref 130–400)
POTASSIUM SERPL-MCNC: 3.8 MMOL/L — SIGNIFICANT CHANGE UP (ref 3.5–5)
POTASSIUM SERPL-SCNC: 3.8 MMOL/L — SIGNIFICANT CHANGE UP (ref 3.5–5)
PROT SERPL-MCNC: 6 G/DL — SIGNIFICANT CHANGE UP (ref 6–8)
RBC # BLD: 4.53 M/UL — SIGNIFICANT CHANGE UP (ref 4.2–5.4)
RBC # FLD: 18.7 % — HIGH (ref 11.5–14.5)
SODIUM SERPL-SCNC: 135 MMOL/L — SIGNIFICANT CHANGE UP (ref 135–146)
WBC # BLD: 5.12 K/UL — SIGNIFICANT CHANGE UP (ref 4.8–10.8)
WBC # FLD AUTO: 5.12 K/UL — SIGNIFICANT CHANGE UP (ref 4.8–10.8)

## 2021-06-09 PROCEDURE — 99233 SBSQ HOSP IP/OBS HIGH 50: CPT

## 2021-06-09 RX ADMIN — CLOPIDOGREL BISULFATE 75 MILLIGRAM(S): 75 TABLET, FILM COATED ORAL at 11:21

## 2021-06-09 RX ADMIN — Medication 81 MILLIGRAM(S): at 11:21

## 2021-06-09 RX ADMIN — Medication 4 UNIT(S): at 17:15

## 2021-06-09 RX ADMIN — SENNA PLUS 2 TABLET(S): 8.6 TABLET ORAL at 21:43

## 2021-06-09 RX ADMIN — Medication 4: at 11:50

## 2021-06-09 RX ADMIN — Medication 4 UNIT(S): at 11:50

## 2021-06-09 RX ADMIN — RISPERIDONE 1 MILLIGRAM(S): 4 TABLET ORAL at 11:22

## 2021-06-09 RX ADMIN — INSULIN GLARGINE 12 UNIT(S): 100 INJECTION, SOLUTION SUBCUTANEOUS at 21:43

## 2021-06-09 RX ADMIN — POLYETHYLENE GLYCOL 3350 17 GRAM(S): 17 POWDER, FOR SOLUTION ORAL at 17:14

## 2021-06-09 RX ADMIN — RISPERIDONE 3 MILLIGRAM(S): 4 TABLET ORAL at 21:43

## 2021-06-09 RX ADMIN — Medication 40 MILLIGRAM(S): at 17:15

## 2021-06-09 RX ADMIN — CHLORHEXIDINE GLUCONATE 1 APPLICATION(S): 213 SOLUTION TOPICAL at 05:30

## 2021-06-09 RX ADMIN — Medication 200 MILLIGRAM(S): at 05:30

## 2021-06-09 RX ADMIN — BUDESONIDE AND FORMOTEROL FUMARATE DIHYDRATE 2 PUFF(S): 160; 4.5 AEROSOL RESPIRATORY (INHALATION) at 21:43

## 2021-06-09 RX ADMIN — Medication 10 MILLIGRAM(S): at 21:43

## 2021-06-09 RX ADMIN — ATORVASTATIN CALCIUM 80 MILLIGRAM(S): 80 TABLET, FILM COATED ORAL at 21:43

## 2021-06-09 RX ADMIN — Medication 4 UNIT(S): at 07:46

## 2021-06-09 RX ADMIN — BUDESONIDE AND FORMOTEROL FUMARATE DIHYDRATE 2 PUFF(S): 160; 4.5 AEROSOL RESPIRATORY (INHALATION) at 07:46

## 2021-06-09 RX ADMIN — QUETIAPINE FUMARATE 100 MILLIGRAM(S): 200 TABLET, FILM COATED ORAL at 21:43

## 2021-06-09 RX ADMIN — Medication 10 MILLIGRAM(S): at 05:29

## 2021-06-09 RX ADMIN — ENOXAPARIN SODIUM 40 MILLIGRAM(S): 100 INJECTION SUBCUTANEOUS at 11:22

## 2021-06-09 RX ADMIN — Medication 10 MILLIGRAM(S): at 13:13

## 2021-06-09 RX ADMIN — Medication 1: at 17:15

## 2021-06-09 RX ADMIN — LISINOPRIL 40 MILLIGRAM(S): 2.5 TABLET ORAL at 05:29

## 2021-06-09 RX ADMIN — Medication 40 MILLIGRAM(S): at 05:30

## 2021-06-09 NOTE — PROGRESS NOTE ADULT - SUBJECTIVE AND OBJECTIVE BOX
WILLIAN MARY 56y Female  MRN#: [id number]   CODE STATUS:FULL      SUBJECTIVE  Patient is a 56y old Female who presents with a chief complaint of CHF exacerbation (09 Jun 2021 09:08)  Currently admitted to medicine with the primary diagnosis of CHF exacerbation    Patient seen at bedside. States she feels better. Endorsing dyspnea with exertion at baseline. No events overnight.    OBJECTIVE  PAST MEDICAL & SURGICAL HISTORY  Hypertension    Hyperlipidemia    Anxiety and depression    COPD, severe    CHF (congestive heart failure)    Cerebrovascular accident (CVA)  Multiple    Type 2 diabetes mellitus    CKD (chronic kidney disease), stage II    No significant past surgical history      ALLERGIES:  No Known Allergies    MEDICATIONS:  STANDING MEDICATIONS  aspirin  chewable 81 milliGRAM(s) Oral daily  atorvastatin 80 milliGRAM(s) Oral at bedtime  budesonide 160 MICROgram(s)/formoterol 4.5 MICROgram(s) Inhaler 2 Puff(s) Inhalation two times a day  busPIRone 10 milliGRAM(s) Oral three times a day  chlorhexidine 4% Liquid 1 Application(s) Topical <User Schedule>  clopidogrel Tablet 75 milliGRAM(s) Oral daily  dextrose 40% Gel 15 Gram(s) Oral once  dextrose 5%. 1000 milliLiter(s) IV Continuous <Continuous>  dextrose 5%. 1000 milliLiter(s) IV Continuous <Continuous>  dextrose 50% Injectable 25 Gram(s) IV Push once  dextrose 50% Injectable 12.5 Gram(s) IV Push once  dextrose 50% Injectable 25 Gram(s) IV Push once  enoxaparin Injectable 40 milliGRAM(s) SubCutaneous daily  furosemide   Injectable 40 milliGRAM(s) IV Push every 12 hours  glucagon  Injectable 1 milliGRAM(s) IntraMuscular once  insulin glargine Injectable (LANTUS) 12 Unit(s) SubCutaneous at bedtime  insulin lispro (ADMELOG) corrective regimen sliding scale   SubCutaneous three times a day before meals  insulin lispro Injectable (ADMELOG) 4 Unit(s) SubCutaneous three times a day before meals  lisinopril 40 milliGRAM(s) Oral daily  metoprolol succinate  milliGRAM(s) Oral daily  polyethylene glycol 3350 17 Gram(s) Oral two times a day  QUEtiapine 100 milliGRAM(s) Oral at bedtime  risperiDONE   Tablet 1 milliGRAM(s) Oral daily  risperiDONE   Tablet 3 milliGRAM(s) Oral at bedtime  senna 2 Tablet(s) Oral at bedtime    PRN MEDICATIONS  bisacodyl Suppository 10 milliGRAM(s) Rectal daily PRN      VITAL SIGNS: Last 24 Hours  T(C): 35.9 (09 Jun 2021 05:32), Max: 36.4 (08 Jun 2021 20:29)  T(F): 96.7 (09 Jun 2021 05:32), Max: 97.6 (08 Jun 2021 20:29)  HR: 85 (09 Jun 2021 05:32) (85 - 86)  BP: 126/80 (09 Jun 2021 05:32) (126/80 - 138/88)  BP(mean): --  RR: 18 (09 Jun 2021 05:32) (18 - 18)  SpO2: 95% (08 Jun 2021 19:58) (95% - 95%)    LABS:                        12.7   5.12  )-----------( 204      ( 09 Jun 2021 08:15 )             41.1     06-09    135  |  98  |  17  ----------------------------<  94  3.8   |  30  |  0.9    Ca    8.6      09 Jun 2021 08:15  Phos  3.7     06-08  Mg     1.8     06-09    TPro  6.0  /  Alb  3.3<L>  /  TBili  0.7  /  DBili  x   /  AST  12  /  ALT  8   /  AlkPhos  99  06-09      PHYSICAL EXAM:    GENERAL: NAD, well-developed, AAOx3  HEENT:  Atraumatic, Normocephalic. EOMI, PERRLA, conjunctiva and sclera clear, No JVD  PULMONARY: Expiratory wheeze B/L lung field  CARDIOVASCULAR: Regular rate and rhythm; No murmurs, rubs, or gallops  GASTROINTESTINAL: Soft, Nontender, Nondistended; Bowel sounds present  MUSCULOSKELETAL:  Edema improved today, mnimal  NEUROLOGY: non-focal  SKIN: No rashes or lesions      ASSESSMENT & PLAN    Patient is a 56 year old woman with PMH chronic hypoxic respiratory failure (on 4L home O2) secondary to  HFrEF 38%, HTN, HLD, DM2, CORNELIUS on CPAP, CVA with residual LE weakness (2014, wheelchair bound),  COPD, depression who presents of worsening shortness of breath and increase weight with  B/L LE x 2weeks. Admitted for acute on chronic CHF exacerbation.    1) Acute on chronic CHF exacerbation with resolved acute on chronic hypoxic respiratory failure  - Currently on 5L NC (on 4L at home)  - BNP 6/6: 7726  - CXR 6/6: cardiomegaly and bilateral interstitial opacities  - Continue IV lasix 40mg q12h  - Daily weights/ strict I+O/ fluid restrict  - Continue lisinopril 40mg q24hrs, metoprolol succinate 200mg q24hrs  - Heart failure consult appreciated   - Echo 2/2020: EF 38%, grade II diastolic dysfunction    2) DM II  - Lantus 12 qhs, lispro 4/4/4  - 6/7/21 A1c 6.5    3) HLD  - Continue atorvastatin 80mg qhs  - Lipid panel: LDL 42, HDL 29, triglycerides 183     4) COPD/pulmonary HTN  - Continue inhalers    5) CORNELIUS/OHS ? on CPAP  - CPAP at night  - Outpatient pulm follow-up    6) CVA with residual LE weakness  - c/w ASA/plavix/statin    7) Depression/ mood disorder  - Continue risperidone 3mg qhs, quetiapine 100mg qhs, buspirone 10mg TID    MISC:  - DVT ppx: lovenox  - PPI ppx: NA  - DIET: DASH carb consistent, fluid restriction  - Code Status: Full Code

## 2021-06-09 NOTE — PHYSICAL THERAPY INITIAL EVALUATION ADULT - SPECIFY REASON(S)
Pt declined participating in PT IE. Pt reported that she was too tired. PT will f/u when appropriate.
Patient refused to participate in therapy despite max encouragement. Patient states she can't to anything right now and her legs feel like a log. States she will try tomorrow.
pt declines PT services despite ed.

## 2021-06-09 NOTE — PROGRESS NOTE ADULT - SUBJECTIVE AND OBJECTIVE BOX
WILLIAN MARY  56y  Female      Patient is a 56y old  Female who presents with a chief complaint of CHF exacerbation (09 Jun 2021 12:54)      INTERVAL HPI/OVERNIGHT EVENTS:  She is still with SOB, no chest pain, no overnight events.   Vital Signs Last 24 Hrs  T(C): 35.6 (09 Jun 2021 12:54), Max: 36.4 (08 Jun 2021 20:29)  T(F): 96.1 (09 Jun 2021 12:54), Max: 97.6 (08 Jun 2021 20:29)  HR: 83 (09 Jun 2021 12:54) (83 - 86)  BP: 141/83 (09 Jun 2021 12:54) (126/80 - 141/83)  BP(mean): --  RR: 18 (09 Jun 2021 12:54) (18 - 18)  SpO2: 95% (08 Jun 2021 19:58) (95% - 95%)      06-08-21 @ 07:01  -  06-09-21 @ 07:00  --------------------------------------------------------  IN: 400 mL / OUT: 1600 mL / NET: -1200 mL    06-09-21 @ 07:01  -  06-09-21 @ 15:37  --------------------------------------------------------  IN: 320 mL / OUT: 1000 mL / NET: -680 mL            Consultant(s) Notes Reviewed:  [x ] YES  [ ] NO          MEDICATIONS  (STANDING):  aspirin  chewable 81 milliGRAM(s) Oral daily  atorvastatin 80 milliGRAM(s) Oral at bedtime  budesonide 160 MICROgram(s)/formoterol 4.5 MICROgram(s) Inhaler 2 Puff(s) Inhalation two times a day  busPIRone 10 milliGRAM(s) Oral three times a day  chlorhexidine 4% Liquid 1 Application(s) Topical <User Schedule>  clopidogrel Tablet 75 milliGRAM(s) Oral daily  dextrose 40% Gel 15 Gram(s) Oral once  dextrose 5%. 1000 milliLiter(s) (50 mL/Hr) IV Continuous <Continuous>  dextrose 5%. 1000 milliLiter(s) (100 mL/Hr) IV Continuous <Continuous>  dextrose 50% Injectable 25 Gram(s) IV Push once  dextrose 50% Injectable 12.5 Gram(s) IV Push once  dextrose 50% Injectable 25 Gram(s) IV Push once  enoxaparin Injectable 40 milliGRAM(s) SubCutaneous daily  furosemide   Injectable 40 milliGRAM(s) IV Push every 12 hours  glucagon  Injectable 1 milliGRAM(s) IntraMuscular once  insulin glargine Injectable (LANTUS) 12 Unit(s) SubCutaneous at bedtime  insulin lispro (ADMELOG) corrective regimen sliding scale   SubCutaneous three times a day before meals  insulin lispro Injectable (ADMELOG) 4 Unit(s) SubCutaneous three times a day before meals  lisinopril 40 milliGRAM(s) Oral daily  metoprolol succinate  milliGRAM(s) Oral daily  polyethylene glycol 3350 17 Gram(s) Oral two times a day  QUEtiapine 100 milliGRAM(s) Oral at bedtime  risperiDONE   Tablet 1 milliGRAM(s) Oral daily  risperiDONE   Tablet 3 milliGRAM(s) Oral at bedtime  senna 2 Tablet(s) Oral at bedtime    MEDICATIONS  (PRN):  bisacodyl Suppository 10 milliGRAM(s) Rectal daily PRN Constipation      LABS                          12.7   5.12  )-----------( 204      ( 09 Jun 2021 08:15 )             41.1     06-09    135  |  98  |  17  ----------------------------<  94  3.8   |  30  |  0.9    Ca    8.6      09 Jun 2021 08:15  Phos  3.7     06-08  Mg     1.8     06-09    TPro  6.0  /  Alb  3.3<L>  /  TBili  0.7  /  DBili  x   /  AST  12  /  ALT  8   /  AlkPhos  99  06-09          Lactate Trend        CAPILLARY BLOOD GLUCOSE      POCT Blood Glucose.: 318 mg/dL (09 Jun 2021 11:34)        RADIOLOGY & ADDITIONAL TESTS:    Imaging Personally Reviewed:  [ ] YES  [ ] NO    HEALTH ISSUES - PROBLEM Dx:          PHYSICAL EXAM:  GENERAL: NAD, well-developed.  HEAD:  Atraumatic, Normocephalic.  EYES: EOMI, PERRLA, conjunctiva and sclera clear.  NECK: Supple, JVD  CHEST/LUNG: Bibasilar rales.   HEART: Regular rate and rhythm; S1 S2.   ABDOMEN: Soft, Nontender, Nondistended; Bowel sounds present.  EXTREMITIES: LE edema 2+,  PSYCH: AAOx3.  NEUROLOGY: non-focal.  SKIN: No rashes or lesions.

## 2021-06-09 NOTE — PROGRESS NOTE ADULT - ASSESSMENT
Acute on chronic systolic HF /fluid overload     Patient is fluid overloaded on exam  Get BMP  daily   Continue furosemide 40 mg iv BID  Continue BB  Maintain potassium >4.0, Mg >2.1  Strict intake and output  Daily weight   Will continue to follow   Acute on chronic systolic HF /fluid overload     Patient remains fluid overloaded on exam  Get BMP  daily   Continue furosemide 40 mg iv BID  Continue BB  Maintain potassium >4.0, Mg >2.1  Strict intake and output  Daily weight   Will continue to follow

## 2021-06-09 NOTE — PROGRESS NOTE ADULT - SUBJECTIVE AND OBJECTIVE BOX
Date of Admission:    Interval History:    -      HISTORY OF PRESENT ILLNESS:     56 year old female with a pmhx of chronic hypoxic respiratory failure (on 4L home O2) secondary to  HFrEF 38% (last exacerbation in ) , HTN, HLD, DM2, active smoker , CVA with residual LE weakness (, wheelchair bound),  COPD , mood /depression,  presents of worsening shortness of breath and increase weight with  B/L LE x 2weeks. Patient mentions that she hasn't been taking Lasix for the past two weeks as she ran out of it. Reports she is non compliant with fluid intake. Denies any chest pain, nausea, vomiting, urinary symptoms, fever, chills. Of note patient received 1 st dose of Moderna two weeks, due for 2nd shot on .    Patient reports worsening SOB for the past few weeks, now feeling better after diuresis. Patient endorses non-compliance with low sodium diet and was not aware she did not receive her Lasix on her medication package from the pharmacy. Patient denies c/p, palpitations, headaches, bleeding, LH, dizziness, orthopnea, PND, LOC, cough, feeling bloated, N/V/D.        PAST MEDICAL & SURGICAL HISTORY:    Hypertension  Hyperlipidemia  Anxiety and depression  COPD, severe  CHF (congestive heart failure)  Cerebrovascular accident (CVA) Multiple  Type 2 diabetes mellitus  CKD (chronic kidney disease), stage II    No significant past surgical history        FAMILY HISTORY:    No pertinent family history of premature cardiovascular disease in first degree relatives.  Mother:  of cancer at 65  Father:  of an overdose at 50    SOCIAL HISTORY:    Smokes at least a cigarette a day, denies alcohol or drug use, lives alone    Allergies    No Known Allergies    Intolerances      PHYSICAL EXAM:    General Appearance: well appearing, normal for age and gender. 	  Neck: normal JVP, no bruit.   Cardiovascular: regular rate and rhythm S1 S2, + JVD, No murmurs, ble edema  Respiratory: Lungs clear to auscultation	  Psychiatry: Alert and oriented x 3, Mood & affect appropriate  Gastrointestinal:  Soft, Non-tender  Musculoskeletal/ extremities: Normal range of motion, No clubbing, cyanosis ble edema  Vascular: Peripheral pulses palpable 2+ bilaterally      PREVIOUS DIAGNOSTIC TESTING:      TTE     Summary:   1. Moderately decreased segmental left ventricular systolic function.   2. LV Ejection Fraction by Urena's Method with a biplane EF of 38 %.   3. Spectral Doppler shows pseudonormal pattern of left ventricular myocardial filling (Grade IIdiastolic dysfunction).   4. Mild to moderate mitral valve regurgitation.   5. The mitral valve leaflets are tethered which is due to reduced systolic function and elevated LVDP.   6. Mild-moderate tricuspid regurgitation.   7. Mild aortic regurgitation.   8. Sclerotic aortic valve with normal opening.   9. Estimated pulmonary artery systolic pressure is 59.7 mmHg assuming a right atrial pressure of 15 mmHg, which is consistent with moderate pulmonary hypertension.      Home Medications:  Albuterol (Eqv-ProAir HFA) 90 mcg/inh inhalation aerosol: 2 puff(s) inhaled every 6 hours, As Needed (2021 11:22)  aspirin 81 mg oral tablet: 1 tab(s) orally once a day (:)  BuSpar 10 mg oral tablet: 1 tab(s) orally 3 times a day (:)  fenofibrate 145 mg oral tablet: 1 tab(s) orally once a day (:)  glipiZIDE 10 mg oral tablet: 1 tab(s) orally 2 times a day (:22)  Lipitor 40 mg oral tablet: 1 tab(s) orally once a day (:22)  lisinopril 40 mg oral tablet: 1 tab(s) orally once a day (:22)  metFORMIN 1000 mg oral tablet: 1 tab(s) orally 2 times a day (:22)  Metoprolol Tartrate 50 mg oral tablet: 1 tab(s) orally 2 times a day (:)  Norvasc 10 mg oral tablet: 1 tab(s) orally once a day (:22)  Plavix 75 mg oral tablet: 1 tab(s) orally once a day (:22)  SEROquel 100 mg oral tablet: 2 tab(s) orally once a day (at bedtime) (:22)  Tresiba 100 units/mL subcutaneous solution: 50 unit(s) subcutaneous once a day (2021 11:22)    MEDICATIONS  (STANDING):  aspirin  chewable 81 milliGRAM(s) Oral daily  atorvastatin 80 milliGRAM(s) Oral at bedtime  budesonide 160 MICROgram(s)/formoterol 4.5 MICROgram(s) Inhaler 2 Puff(s) Inhalation two times a day  busPIRone 10 milliGRAM(s) Oral three times a day  chlorhexidine 4% Liquid 1 Application(s) Topical <User Schedule>  clopidogrel Tablet 75 milliGRAM(s) Oral daily  dextrose 40% Gel 15 Gram(s) Oral once  dextrose 5%. 1000 milliLiter(s) (50 mL/Hr) IV Continuous <Continuous>  dextrose 5%. 1000 milliLiter(s) (100 mL/Hr) IV Continuous <Continuous>  dextrose 50% Injectable 25 Gram(s) IV Push once  dextrose 50% Injectable 12.5 Gram(s) IV Push once  dextrose 50% Injectable 25 Gram(s) IV Push once  enoxaparin Injectable 40 milliGRAM(s) SubCutaneous daily  furosemide   Injectable 40 milliGRAM(s) IV Push every 12 hours  glucagon  Injectable 1 milliGRAM(s) IntraMuscular once  insulin glargine Injectable (LANTUS) 12 Unit(s) SubCutaneous at bedtime  insulin lispro (ADMELOG) corrective regimen sliding scale   SubCutaneous three times a day before meals  insulin lispro Injectable (ADMELOG) 4 Unit(s) SubCutaneous three times a day before meals  lisinopril 40 milliGRAM(s) Oral daily  metoprolol succinate  milliGRAM(s) Oral daily  polyethylene glycol 3350 17 Gram(s) Oral two times a day  QUEtiapine 100 milliGRAM(s) Oral at bedtime  risperiDONE   Tablet 1 milliGRAM(s) Oral daily  risperiDONE   Tablet 3 milliGRAM(s) Oral at bedtime  senna 2 Tablet(s) Oral at bedtime    MEDICATIONS  (PRN):  bisacodyl Suppository 10 milliGRAM(s) Rectal daily PRN Constipation         Date of Admission:    Interval History:    - Patient resting in bed, no acute events over night  - Remains fluid overloaded      HISTORY OF PRESENT ILLNESS:     56 year old female with a pmhx of chronic hypoxic respiratory failure (on 4L home O2) secondary to  HFrEF 38% (last exacerbation in ) , HTN, HLD, DM2, active smoker , CVA with residual LE weakness (, wheelchair bound),  COPD , mood /depression,  presents of worsening shortness of breath and increase weight with  B/L LE x 2weeks. Patient mentions that she hasn't been taking Lasix for the past two weeks as she ran out of it. Reports she is non compliant with fluid intake. Denies any chest pain, nausea, vomiting, urinary symptoms, fever, chills. Of note patient received 1 st dose of Moderna two weeks, due for 2nd shot on .    Patient reports worsening SOB for the past few weeks, now feeling better after diuresis. Patient endorses non-compliance with low sodium diet and was not aware she did not receive her Lasix on her medication package from the pharmacy. Patient denies c/p, palpitations, headaches, bleeding, LH, dizziness, orthopnea, PND, LOC, cough, feeling bloated, N/V/D.        PAST MEDICAL & SURGICAL HISTORY:    Hypertension  Hyperlipidemia  Anxiety and depression  COPD, severe  CHF (congestive heart failure)  Cerebrovascular accident (CVA) Multiple  Type 2 diabetes mellitus  CKD (chronic kidney disease), stage II    No significant past surgical history        FAMILY HISTORY:    No pertinent family history of premature cardiovascular disease in first degree relatives.  Mother:  of cancer at 65  Father:  of an overdose at 50    SOCIAL HISTORY:    Smokes at least a cigarette a day, denies alcohol or drug use, lives alone    Allergies    No Known Allergies    Intolerances      PHYSICAL EXAM:    General Appearance: well appearing, normal for age and gender. 	  Neck: normal JVP, no bruit.   Cardiovascular: regular rate and rhythm S1 S2, + JVD, No murmurs, ble edema  Respiratory: Lungs clear to auscultation	  Psychiatry: Alert and oriented x 3, Mood & affect appropriate  Gastrointestinal:  Soft, Non-tender  Musculoskeletal/ extremities: Normal range of motion, No clubbing, cyanosis ble edema  Vascular: Peripheral pulses palpable 2+ bilaterally      PREVIOUS DIAGNOSTIC TESTING:      TTE     Summary:   1. Moderately decreased segmental left ventricular systolic function.   2. LV Ejection Fraction by Urena's Method with a biplane EF of 38 %.   3. Spectral Doppler shows pseudonormal pattern of left ventricular myocardial filling (Grade IIdiastolic dysfunction).   4. Mild to moderate mitral valve regurgitation.   5. The mitral valve leaflets are tethered which is due to reduced systolic function and elevated LVDP.   6. Mild-moderate tricuspid regurgitation.   7. Mild aortic regurgitation.   8. Sclerotic aortic valve with normal opening.   9. Estimated pulmonary artery systolic pressure is 59.7 mmHg assuming a right atrial pressure of 15 mmHg, which is consistent with moderate pulmonary hypertension.      Home Medications:  Albuterol (Eqv-ProAir HFA) 90 mcg/inh inhalation aerosol: 2 puff(s) inhaled every 6 hours, As Needed (2021 11:22)  aspirin 81 mg oral tablet: 1 tab(s) orally once a day (:)  BuSpar 10 mg oral tablet: 1 tab(s) orally 3 times a day (:)  fenofibrate 145 mg oral tablet: 1 tab(s) orally once a day (:22)  glipiZIDE 10 mg oral tablet: 1 tab(s) orally 2 times a day (2021 11:22)  Lipitor 40 mg oral tablet: 1 tab(s) orally once a day (:)  lisinopril 40 mg oral tablet: 1 tab(s) orally once a day (:22)  metFORMIN 1000 mg oral tablet: 1 tab(s) orally 2 times a day (2021 11:22)  Metoprolol Tartrate 50 mg oral tablet: 1 tab(s) orally 2 times a day (:22)  Norvasc 10 mg oral tablet: 1 tab(s) orally once a day (:22)  Plavix 75 mg oral tablet: 1 tab(s) orally once a day (:)  SEROquel 100 mg oral tablet: 2 tab(s) orally once a day (at bedtime) (:)  Tresiba 100 units/mL subcutaneous solution: 50 unit(s) subcutaneous once a day (2021 11:22)    MEDICATIONS  (STANDING):  aspirin  chewable 81 milliGRAM(s) Oral daily  atorvastatin 80 milliGRAM(s) Oral at bedtime  budesonide 160 MICROgram(s)/formoterol 4.5 MICROgram(s) Inhaler 2 Puff(s) Inhalation two times a day  busPIRone 10 milliGRAM(s) Oral three times a day  chlorhexidine 4% Liquid 1 Application(s) Topical <User Schedule>  clopidogrel Tablet 75 milliGRAM(s) Oral daily  dextrose 40% Gel 15 Gram(s) Oral once  dextrose 5%. 1000 milliLiter(s) (50 mL/Hr) IV Continuous <Continuous>  dextrose 5%. 1000 milliLiter(s) (100 mL/Hr) IV Continuous <Continuous>  dextrose 50% Injectable 25 Gram(s) IV Push once  dextrose 50% Injectable 12.5 Gram(s) IV Push once  dextrose 50% Injectable 25 Gram(s) IV Push once  enoxaparin Injectable 40 milliGRAM(s) SubCutaneous daily  furosemide   Injectable 40 milliGRAM(s) IV Push every 12 hours  glucagon  Injectable 1 milliGRAM(s) IntraMuscular once  insulin glargine Injectable (LANTUS) 12 Unit(s) SubCutaneous at bedtime  insulin lispro (ADMELOG) corrective regimen sliding scale   SubCutaneous three times a day before meals  insulin lispro Injectable (ADMELOG) 4 Unit(s) SubCutaneous three times a day before meals  lisinopril 40 milliGRAM(s) Oral daily  metoprolol succinate  milliGRAM(s) Oral daily  polyethylene glycol 3350 17 Gram(s) Oral two times a day  QUEtiapine 100 milliGRAM(s) Oral at bedtime  risperiDONE   Tablet 1 milliGRAM(s) Oral daily  risperiDONE   Tablet 3 milliGRAM(s) Oral at bedtime  senna 2 Tablet(s) Oral at bedtime    MEDICATIONS  (PRN):  bisacodyl Suppository 10 milliGRAM(s) Rectal daily PRN Constipation         Date of Admission:    Interval History:    - Patient resting in bed, no acute events overnight   - Remains fluid overloaded      HISTORY OF PRESENT ILLNESS:     56 year old female with a pmhx of chronic hypoxic respiratory failure (on 4L home O2) secondary to  HFrEF 38% (last exacerbation in ) , HTN, HLD, DM2, active smoker , CVA with residual LE weakness (, wheelchair bound),  COPD , mood /depression,  presents of worsening shortness of breath and increase weight with  B/L LE x 2weeks. Patient mentions that she hasn't been taking Lasix for the past two weeks as she ran out of it. Reports she is non compliant with fluid intake. Denies any chest pain, nausea, vomiting, urinary symptoms, fever, chills. Of note patient received 1 st dose of Moderna two weeks, due for 2nd shot on .    Patient reports worsening SOB for the past few weeks, now feeling better after diuresis. Patient endorses non-compliance with low sodium diet and was not aware she did not receive her Lasix on her medication package from the pharmacy. Patient denies c/p, palpitations, headaches, bleeding, LH, dizziness, orthopnea, PND, LOC, cough, feeling bloated, N/V/D.        PAST MEDICAL & SURGICAL HISTORY:    Hypertension  Hyperlipidemia  Anxiety and depression  COPD, severe  CHF (congestive heart failure)  Cerebrovascular accident (CVA) Multiple  Type 2 diabetes mellitus  CKD (chronic kidney disease), stage II    No significant past surgical history        FAMILY HISTORY:    No pertinent family history of premature cardiovascular disease in first degree relatives.  Mother:  of cancer at 65  Father:  of an overdose at 50    SOCIAL HISTORY:    Smokes at least a cigarette a day, denies alcohol or drug use, lives alone    Allergies    No Known Allergies    Intolerances      PHYSICAL EXAM:    General Appearance: well appearing, normal for age and gender. 	  Neck: normal JVP, no bruit.   Cardiovascular: regular rate and rhythm S1 S2, + JVD, No murmurs, ble edema  Respiratory: Lungs clear to auscultation	  Psychiatry: Alert and oriented x 3, Mood & affect appropriate  Gastrointestinal:  Soft, Non-tender  Musculoskeletal/ extremities: Normal range of motion, No clubbing, cyanosis ble edema  Vascular: Peripheral pulses palpable 2+ bilaterally      PREVIOUS DIAGNOSTIC TESTING:      TTE     Summary:   1. Moderately decreased segmental left ventricular systolic function.   2. LV Ejection Fraction by Urena's Method with a biplane EF of 38 %.   3. Spectral Doppler shows pseudonormal pattern of left ventricular myocardial filling (Grade IIdiastolic dysfunction).   4. Mild to moderate mitral valve regurgitation.   5. The mitral valve leaflets are tethered which is due to reduced systolic function and elevated LVDP.   6. Mild-moderate tricuspid regurgitation.   7. Mild aortic regurgitation.   8. Sclerotic aortic valve with normal opening.   9. Estimated pulmonary artery systolic pressure is 59.7 mmHg assuming a right atrial pressure of 15 mmHg, which is consistent with moderate pulmonary hypertension.      Home Medications:  Albuterol (Eqv-ProAir HFA) 90 mcg/inh inhalation aerosol: 2 puff(s) inhaled every 6 hours, As Needed (2021 11:22)  aspirin 81 mg oral tablet: 1 tab(s) orally once a day (:22)  BuSpar 10 mg oral tablet: 1 tab(s) orally 3 times a day (2021 11:)  fenofibrate 145 mg oral tablet: 1 tab(s) orally once a day (:22)  glipiZIDE 10 mg oral tablet: 1 tab(s) orally 2 times a day (2021 11:22)  Lipitor 40 mg oral tablet: 1 tab(s) orally once a day (:22)  lisinopril 40 mg oral tablet: 1 tab(s) orally once a day (2021 11:22)  metFORMIN 1000 mg oral tablet: 1 tab(s) orally 2 times a day (2021 11:22)  Metoprolol Tartrate 50 mg oral tablet: 1 tab(s) orally 2 times a day (:22)  Norvasc 10 mg oral tablet: 1 tab(s) orally once a day (2021 11:22)  Plavix 75 mg oral tablet: 1 tab(s) orally once a day (:)  SEROquel 100 mg oral tablet: 2 tab(s) orally once a day (at bedtime) (:22)  Tresiba 100 units/mL subcutaneous solution: 50 unit(s) subcutaneous once a day (2021 11:22)    MEDICATIONS  (STANDING):  aspirin  chewable 81 milliGRAM(s) Oral daily  atorvastatin 80 milliGRAM(s) Oral at bedtime  budesonide 160 MICROgram(s)/formoterol 4.5 MICROgram(s) Inhaler 2 Puff(s) Inhalation two times a day  busPIRone 10 milliGRAM(s) Oral three times a day  chlorhexidine 4% Liquid 1 Application(s) Topical <User Schedule>  clopidogrel Tablet 75 milliGRAM(s) Oral daily  dextrose 40% Gel 15 Gram(s) Oral once  dextrose 5%. 1000 milliLiter(s) (50 mL/Hr) IV Continuous <Continuous>  dextrose 5%. 1000 milliLiter(s) (100 mL/Hr) IV Continuous <Continuous>  dextrose 50% Injectable 25 Gram(s) IV Push once  dextrose 50% Injectable 12.5 Gram(s) IV Push once  dextrose 50% Injectable 25 Gram(s) IV Push once  enoxaparin Injectable 40 milliGRAM(s) SubCutaneous daily  furosemide   Injectable 40 milliGRAM(s) IV Push every 12 hours  glucagon  Injectable 1 milliGRAM(s) IntraMuscular once  insulin glargine Injectable (LANTUS) 12 Unit(s) SubCutaneous at bedtime  insulin lispro (ADMELOG) corrective regimen sliding scale   SubCutaneous three times a day before meals  insulin lispro Injectable (ADMELOG) 4 Unit(s) SubCutaneous three times a day before meals  lisinopril 40 milliGRAM(s) Oral daily  metoprolol succinate  milliGRAM(s) Oral daily  polyethylene glycol 3350 17 Gram(s) Oral two times a day  QUEtiapine 100 milliGRAM(s) Oral at bedtime  risperiDONE   Tablet 1 milliGRAM(s) Oral daily  risperiDONE   Tablet 3 milliGRAM(s) Oral at bedtime  senna 2 Tablet(s) Oral at bedtime    MEDICATIONS  (PRN):  bisacodyl Suppository 10 milliGRAM(s) Rectal daily PRN Constipation

## 2021-06-10 LAB
ALBUMIN SERPL ELPH-MCNC: 3.3 G/DL — LOW (ref 3.5–5.2)
ALP SERPL-CCNC: 104 U/L — SIGNIFICANT CHANGE UP (ref 30–115)
ALT FLD-CCNC: 6 U/L — SIGNIFICANT CHANGE UP (ref 0–41)
ANION GAP SERPL CALC-SCNC: 9 MMOL/L — SIGNIFICANT CHANGE UP (ref 7–14)
AST SERPL-CCNC: 11 U/L — SIGNIFICANT CHANGE UP (ref 0–41)
BASOPHILS # BLD AUTO: 0.07 K/UL — SIGNIFICANT CHANGE UP (ref 0–0.2)
BASOPHILS NFR BLD AUTO: 1.5 % — HIGH (ref 0–1)
BILIRUB SERPL-MCNC: 0.6 MG/DL — SIGNIFICANT CHANGE UP (ref 0.2–1.2)
BUN SERPL-MCNC: 23 MG/DL — HIGH (ref 10–20)
CALCIUM SERPL-MCNC: 8.7 MG/DL — SIGNIFICANT CHANGE UP (ref 8.5–10.1)
CHLORIDE SERPL-SCNC: 101 MMOL/L — SIGNIFICANT CHANGE UP (ref 98–110)
CO2 SERPL-SCNC: 30 MMOL/L — SIGNIFICANT CHANGE UP (ref 17–32)
CREAT SERPL-MCNC: 1 MG/DL — SIGNIFICANT CHANGE UP (ref 0.7–1.5)
EOSINOPHIL # BLD AUTO: 0.12 K/UL — SIGNIFICANT CHANGE UP (ref 0–0.7)
EOSINOPHIL NFR BLD AUTO: 2.7 % — SIGNIFICANT CHANGE UP (ref 0–8)
GLUCOSE BLDC GLUCOMTR-MCNC: 128 MG/DL — HIGH (ref 70–99)
GLUCOSE BLDC GLUCOMTR-MCNC: 131 MG/DL — HIGH (ref 70–99)
GLUCOSE BLDC GLUCOMTR-MCNC: 177 MG/DL — HIGH (ref 70–99)
GLUCOSE BLDC GLUCOMTR-MCNC: 182 MG/DL — HIGH (ref 70–99)
GLUCOSE SERPL-MCNC: 134 MG/DL — HIGH (ref 70–99)
HCT VFR BLD CALC: 40.5 % — SIGNIFICANT CHANGE UP (ref 37–47)
HGB BLD-MCNC: 12.5 G/DL — SIGNIFICANT CHANGE UP (ref 12–16)
IMM GRANULOCYTES NFR BLD AUTO: 0.4 % — HIGH (ref 0.1–0.3)
LYMPHOCYTES # BLD AUTO: 1.48 K/UL — SIGNIFICANT CHANGE UP (ref 1.2–3.4)
LYMPHOCYTES # BLD AUTO: 32.7 % — SIGNIFICANT CHANGE UP (ref 20.5–51.1)
MAGNESIUM SERPL-MCNC: 1.7 MG/DL — LOW (ref 1.8–2.4)
MCHC RBC-ENTMCNC: 27.7 PG — SIGNIFICANT CHANGE UP (ref 27–31)
MCHC RBC-ENTMCNC: 30.9 G/DL — LOW (ref 32–37)
MCV RBC AUTO: 89.6 FL — SIGNIFICANT CHANGE UP (ref 81–99)
MONOCYTES # BLD AUTO: 0.33 K/UL — SIGNIFICANT CHANGE UP (ref 0.1–0.6)
MONOCYTES NFR BLD AUTO: 7.3 % — SIGNIFICANT CHANGE UP (ref 1.7–9.3)
NEUTROPHILS # BLD AUTO: 2.5 K/UL — SIGNIFICANT CHANGE UP (ref 1.4–6.5)
NEUTROPHILS NFR BLD AUTO: 55.4 % — SIGNIFICANT CHANGE UP (ref 42.2–75.2)
NRBC # BLD: 0 /100 WBCS — SIGNIFICANT CHANGE UP (ref 0–0)
PLATELET # BLD AUTO: 228 K/UL — SIGNIFICANT CHANGE UP (ref 130–400)
POTASSIUM SERPL-MCNC: 4 MMOL/L — SIGNIFICANT CHANGE UP (ref 3.5–5)
POTASSIUM SERPL-SCNC: 4 MMOL/L — SIGNIFICANT CHANGE UP (ref 3.5–5)
PROT SERPL-MCNC: 6 G/DL — SIGNIFICANT CHANGE UP (ref 6–8)
RBC # BLD: 4.52 M/UL — SIGNIFICANT CHANGE UP (ref 4.2–5.4)
RBC # FLD: 18.5 % — HIGH (ref 11.5–14.5)
SODIUM SERPL-SCNC: 140 MMOL/L — SIGNIFICANT CHANGE UP (ref 135–146)
WBC # BLD: 4.52 K/UL — LOW (ref 4.8–10.8)
WBC # FLD AUTO: 4.52 K/UL — LOW (ref 4.8–10.8)

## 2021-06-10 PROCEDURE — 99233 SBSQ HOSP IP/OBS HIGH 50: CPT

## 2021-06-10 RX ORDER — MAGNESIUM SULFATE 500 MG/ML
2 VIAL (ML) INJECTION ONCE
Refills: 0 | Status: COMPLETED | OUTPATIENT
Start: 2021-06-10 | End: 2021-06-10

## 2021-06-10 RX ORDER — MAGNESIUM OXIDE 400 MG ORAL TABLET 241.3 MG
400 TABLET ORAL ONCE
Refills: 0 | Status: COMPLETED | OUTPATIENT
Start: 2021-06-10 | End: 2021-06-10

## 2021-06-10 RX ORDER — IPRATROPIUM/ALBUTEROL SULFATE 18-103MCG
3 AEROSOL WITH ADAPTER (GRAM) INHALATION ONCE
Refills: 0 | Status: DISCONTINUED | OUTPATIENT
Start: 2021-06-10 | End: 2021-06-22

## 2021-06-10 RX ADMIN — Medication 1: at 11:52

## 2021-06-10 RX ADMIN — MAGNESIUM OXIDE 400 MG ORAL TABLET 400 MILLIGRAM(S): 241.3 TABLET ORAL at 17:10

## 2021-06-10 RX ADMIN — Medication 40 MILLIGRAM(S): at 06:08

## 2021-06-10 RX ADMIN — INSULIN GLARGINE 12 UNIT(S): 100 INJECTION, SOLUTION SUBCUTANEOUS at 21:34

## 2021-06-10 RX ADMIN — CLOPIDOGREL BISULFATE 75 MILLIGRAM(S): 75 TABLET, FILM COATED ORAL at 11:51

## 2021-06-10 RX ADMIN — Medication 4 UNIT(S): at 17:10

## 2021-06-10 RX ADMIN — Medication 10 MILLIGRAM(S): at 21:46

## 2021-06-10 RX ADMIN — RISPERIDONE 1 MILLIGRAM(S): 4 TABLET ORAL at 11:51

## 2021-06-10 RX ADMIN — Medication 81 MILLIGRAM(S): at 11:51

## 2021-06-10 RX ADMIN — LISINOPRIL 40 MILLIGRAM(S): 2.5 TABLET ORAL at 06:07

## 2021-06-10 RX ADMIN — RISPERIDONE 3 MILLIGRAM(S): 4 TABLET ORAL at 21:46

## 2021-06-10 RX ADMIN — SENNA PLUS 2 TABLET(S): 8.6 TABLET ORAL at 21:46

## 2021-06-10 RX ADMIN — CHLORHEXIDINE GLUCONATE 1 APPLICATION(S): 213 SOLUTION TOPICAL at 06:09

## 2021-06-10 RX ADMIN — ENOXAPARIN SODIUM 40 MILLIGRAM(S): 100 INJECTION SUBCUTANEOUS at 11:51

## 2021-06-10 RX ADMIN — Medication 4 UNIT(S): at 11:52

## 2021-06-10 RX ADMIN — ATORVASTATIN CALCIUM 80 MILLIGRAM(S): 80 TABLET, FILM COATED ORAL at 21:46

## 2021-06-10 RX ADMIN — POLYETHYLENE GLYCOL 3350 17 GRAM(S): 17 POWDER, FOR SOLUTION ORAL at 06:09

## 2021-06-10 RX ADMIN — Medication 40 MILLIGRAM(S): at 17:09

## 2021-06-10 RX ADMIN — BUDESONIDE AND FORMOTEROL FUMARATE DIHYDRATE 2 PUFF(S): 160; 4.5 AEROSOL RESPIRATORY (INHALATION) at 07:54

## 2021-06-10 RX ADMIN — Medication 10 MILLIGRAM(S): at 06:08

## 2021-06-10 RX ADMIN — Medication 10 MILLIGRAM(S): at 14:21

## 2021-06-10 RX ADMIN — QUETIAPINE FUMARATE 100 MILLIGRAM(S): 200 TABLET, FILM COATED ORAL at 21:46

## 2021-06-10 RX ADMIN — Medication 200 MILLIGRAM(S): at 06:07

## 2021-06-10 RX ADMIN — Medication 4 UNIT(S): at 07:54

## 2021-06-10 NOTE — PROGRESS NOTE ADULT - SUBJECTIVE AND OBJECTIVE BOX
WILLIAN MARY  56y  Female      Patient is a 56y old  Female who presents with a chief complaint of CHF exacerbation (10 Davin 2021 10:36)      INTERVAL HPI/OVERNIGHT EVENTS:  She is still with SOB and LE edema.   Vital Signs Last 24 Hrs  T(C): 36.3 (10 Davin 2021 05:29), Max: 36.7 (09 Jun 2021 20:56)  T(F): 97.3 (10 Davin 2021 05:29), Max: 98 (09 Jun 2021 20:56)  HR: 73 (10 Davin 2021 05:29) (73 - 83)  BP: 119/79 (10 Davin 2021 05:29) (119/79 - 141/83)  BP(mean): --  RR: 16 (10 Davin 2021 05:29) (16 - 18)  SpO2: 97% (10 Davin 2021 08:27) (96% - 97%)      06-09-21 @ 07:01  -  06-10-21 @ 07:00  --------------------------------------------------------  IN: 480 mL / OUT: 2250 mL / NET: -1770 mL    06-10-21 @ 07:01  -  06-10-21 @ 11:57  --------------------------------------------------------  IN: 0 mL / OUT: 1000 mL / NET: -1000 mL            Consultant(s) Notes Reviewed:  [x ] YES  [ ] NO          MEDICATIONS  (STANDING):  albuterol/ipratropium for Nebulization. 3 milliLiter(s) Nebulizer once  aspirin  chewable 81 milliGRAM(s) Oral daily  atorvastatin 80 milliGRAM(s) Oral at bedtime  budesonide 160 MICROgram(s)/formoterol 4.5 MICROgram(s) Inhaler 2 Puff(s) Inhalation two times a day  busPIRone 10 milliGRAM(s) Oral three times a day  chlorhexidine 4% Liquid 1 Application(s) Topical <User Schedule>  clopidogrel Tablet 75 milliGRAM(s) Oral daily  dextrose 40% Gel 15 Gram(s) Oral once  dextrose 5%. 1000 milliLiter(s) (50 mL/Hr) IV Continuous <Continuous>  dextrose 5%. 1000 milliLiter(s) (100 mL/Hr) IV Continuous <Continuous>  dextrose 50% Injectable 25 Gram(s) IV Push once  dextrose 50% Injectable 12.5 Gram(s) IV Push once  dextrose 50% Injectable 25 Gram(s) IV Push once  enoxaparin Injectable 40 milliGRAM(s) SubCutaneous daily  furosemide   Injectable 40 milliGRAM(s) IV Push every 12 hours  glucagon  Injectable 1 milliGRAM(s) IntraMuscular once  insulin glargine Injectable (LANTUS) 12 Unit(s) SubCutaneous at bedtime  insulin lispro (ADMELOG) corrective regimen sliding scale   SubCutaneous three times a day before meals  insulin lispro Injectable (ADMELOG) 4 Unit(s) SubCutaneous three times a day before meals  lisinopril 40 milliGRAM(s) Oral daily  magnesium sulfate  IVPB 2 Gram(s) IV Intermittent once  metoprolol succinate  milliGRAM(s) Oral daily  polyethylene glycol 3350 17 Gram(s) Oral two times a day  QUEtiapine 100 milliGRAM(s) Oral at bedtime  risperiDONE   Tablet 1 milliGRAM(s) Oral daily  risperiDONE   Tablet 3 milliGRAM(s) Oral at bedtime  senna 2 Tablet(s) Oral at bedtime    MEDICATIONS  (PRN):  bisacodyl Suppository 10 milliGRAM(s) Rectal daily PRN Constipation      LABS                          12.5   4.52  )-----------( 228      ( 10 Davin 2021 06:20 )             40.5     06-10    140  |  101  |  23<H>  ----------------------------<  134<H>  4.0   |  30  |  1.0    Ca    8.7      10 Davin 2021 06:20  Mg     1.7     06-10    TPro  6.0  /  Alb  3.3<L>  /  TBili  0.6  /  DBili  x   /  AST  11  /  ALT  6   /  AlkPhos  104  06-10          Lactate Trend        CAPILLARY BLOOD GLUCOSE      POCT Blood Glucose.: 182 mg/dL (10 Davin 2021 11:28)        RADIOLOGY & ADDITIONAL TESTS:    Imaging Personally Reviewed:  [ ] YES  [ ] NO    HEALTH ISSUES - PROBLEM Dx:          PHYSICAL EXAM:  GENERAL: NAD, well-developed.  HEAD:  Atraumatic, Normocephalic.  EYES: EOMI, PERRLA, conjunctiva and sclera clear.  NECK: Supple, JVD  CHEST/LUNG: Bibasilar rales, scattered expiratory wheezing.    HEART: Regular rate and rhythm; S1 S2.   ABDOMEN: Soft, Nontender, Nondistended; Bowel sounds present.  EXTREMITIES: LE edema 2+,  PSYCH: AAOx3.  NEUROLOGY: non-focal.  SKIN: No rashes or lesions.

## 2021-06-10 NOTE — PROGRESS NOTE ADULT - ASSESSMENT
56 year old female with PMH of HTN, DM 2, HFrEF, COPD on 4L O2, CVA with residual LE weakness and depression came to ED for worsening SOB and LE edema for the last two weeks. She was not taking her Lasix.   In the ED, Chest X-ray shows pulm vascular congestion and cardiomegaly. BNP elevated. Started on IV Lasix. Pt hypoxic to 94% on NC    A/P:   Acute on chronic hypoxic respiratory failure  Acute HFrEF:   Patient with LE edema, SOB , Pro-BNO  CXR showed vascular congestion.   Echo LVEF 38%,   Continue Lasix 40mg IV BID, Metoprolol XL 200mg mg Daily and Lisinopril 0mg daily.   Daily weight, fluid restriction 1.5L /day, low sodium diet.     Chronic Respiratory failure:   COPD  Chronic tobacco abuse:   Still with expiraotry wheezing on exam.   Continue Symbicort, start on DuoNeb standing dose for 48 hrs     History of CVA with residual LE weakness  Continue ASA, Lipitor   Magnesium deficiency.    HTN: Continue lisinopril, Lasix and Metoprolol.    DM II: FS is controlled, A1C 6.8, continue Lantus 12 units and Pre-meals Lispro.     Depression: Continue Wellbutrin and Risperidone.    #Progress Note Handoff:  Pending (specify):  improving SOB and volume overload.   Family discussion:  need to continue Iv diuresis, daily weight. Counseled for smoking cessation.   Disposition: Home, she will need more than 48hrs of IV diuresis.

## 2021-06-10 NOTE — PROGRESS NOTE ADULT - SUBJECTIVE AND OBJECTIVE BOX
Interval History:    - Patient resting in bed, remains overloaded but responding to diuretics.         HISTORY OF PRESENT ILLNESS:     56 year old female with a pmhx of chronic hypoxic respiratory failure (on 4L home O2) secondary to  HFrEF 38% (last exacerbation in ) , HTN, HLD, DM2, active smoker , CVA with residual LE weakness (2014, wheelchair bound),  COPD , mood /depression,  presents of worsening shortness of breath and increase weight with  B/L LE x 2weeks. Patient mentions that she hasn't been taking Lasix for the past two weeks as she ran out of it. Reports she is non compliant with fluid intake. Denies any chest pain, nausea, vomiting, urinary symptoms, fever, chills. Of note patient received 1 st dose of Moderna two weeks, due for 2nd shot on .    Patient reports worsening SOB for the past few weeks, now feeling better after diuresis. Patient endorses non-compliance with low sodium diet and was not aware she did not receive her Lasix on her medication package from the pharmacy. Patient denies c/p, palpitations, headaches, bleeding, LH, dizziness, orthopnea, PND, LOC, cough, feeling bloated, N/V/D.        PAST MEDICAL & SURGICAL HISTORY:    Hypertension  Hyperlipidemia  Anxiety and depression  COPD, severe  CHF (congestive heart failure)  Cerebrovascular accident (CVA) Multiple  Type 2 diabetes mellitus  CKD (chronic kidney disease), stage II    No significant past surgical history        FAMILY HISTORY:    No pertinent family history of premature cardiovascular disease in first degree relatives.  Mother:  of cancer at 65  Father:  of an overdose at 50    SOCIAL HISTORY:    Smokes at least a cigarette a day, denies alcohol or drug use, lives alone    Allergies    No Known Allergies    Intolerances      PHYSICAL EXAM:    General Appearance: well appearing, normal for age and gender. 	  Neck: normal JVP, no bruit.   Cardiovascular: regular rate and rhythm S1 S2, + JVD, No murmurs, 1+le edema  Respiratory: Lungs clear to auscultation	  Psychiatry: Alert and oriented x 3, Mood & affect appropriate  Gastrointestinal:  Soft, Non-tender  Musculoskeletal/ extremities: Normal range of motion, No clubbing, cyanosis ble edema  Vascular: Peripheral pulses palpable 2+ bilaterally      PREVIOUS DIAGNOSTIC TESTING:      TTE     Summary:   1. Moderately decreased segmental left ventricular systolic function.   2. LV Ejection Fraction by Urena's Method with a biplane EF of 38 %.   3. Spectral Doppler shows pseudonormal pattern of left ventricular myocardial filling (Grade IIdiastolic dysfunction).   4. Mild to moderate mitral valve regurgitation.   5. The mitral valve leaflets are tethered which is due to reduced systolic function and elevated LVDP.   6. Mild-moderate tricuspid regurgitation.   7. Mild aortic regurgitation.   8. Sclerotic aortic valve with normal opening.   9. Estimated pulmonary artery systolic pressure is 59.7 mmHg assuming a right atrial pressure of 15 mmHg, which is consistent with moderate pulmonary hypertension.      Home Medications:  Albuterol (Eqv-ProAir HFA) 90 mcg/inh inhalation aerosol: 2 puff(s) inhaled every 6 hours, As Needed (2021 11:)  aspirin 81 mg oral tablet: 1 tab(s) orally once a day (:)  BuSpar 10 mg oral tablet: 1 tab(s) orally 3 times a day (:)  fenofibrate 145 mg oral tablet: 1 tab(s) orally once a day (:)  glipiZIDE 10 mg oral tablet: 1 tab(s) orally 2 times a day (:22)  Lipitor 40 mg oral tablet: 1 tab(s) orally once a day (:22)  lisinopril 40 mg oral tablet: 1 tab(s) orally once a day (:22)  metFORMIN 1000 mg oral tablet: 1 tab(s) orally 2 times a day (2021 11:22)  Metoprolol Tartrate 50 mg oral tablet: 1 tab(s) orally 2 times a day (:22)  Norvasc 10 mg oral tablet: 1 tab(s) orally once a day (2021 11:22)  Plavix 75 mg oral tablet: 1 tab(s) orally once a day (2021 11:)  SEROquel 100 mg oral tablet: 2 tab(s) orally once a day (at bedtime) (:)  Tresiba 100 units/mL subcutaneous solution: 50 unit(s) subcutaneous once a day (2021 11:22)    MEDICATIONS  (STANDING):  aspirin  chewable 81 milliGRAM(s) Oral daily  atorvastatin 80 milliGRAM(s) Oral at bedtime  budesonide 160 MICROgram(s)/formoterol 4.5 MICROgram(s) Inhaler 2 Puff(s) Inhalation two times a day  busPIRone 10 milliGRAM(s) Oral three times a day  chlorhexidine 4% Liquid 1 Application(s) Topical <User Schedule>  clopidogrel Tablet 75 milliGRAM(s) Oral daily  dextrose 40% Gel 15 Gram(s) Oral once  dextrose 5%. 1000 milliLiter(s) (50 mL/Hr) IV Continuous <Continuous>  dextrose 5%. 1000 milliLiter(s) (100 mL/Hr) IV Continuous <Continuous>  dextrose 50% Injectable 25 Gram(s) IV Push once  dextrose 50% Injectable 12.5 Gram(s) IV Push once  dextrose 50% Injectable 25 Gram(s) IV Push once  enoxaparin Injectable 40 milliGRAM(s) SubCutaneous daily  furosemide   Injectable 40 milliGRAM(s) IV Push every 12 hours  glucagon  Injectable 1 milliGRAM(s) IntraMuscular once  insulin glargine Injectable (LANTUS) 12 Unit(s) SubCutaneous at bedtime  insulin lispro (ADMELOG) corrective regimen sliding scale   SubCutaneous three times a day before meals  insulin lispro Injectable (ADMELOG) 4 Unit(s) SubCutaneous three times a day before meals  lisinopril 40 milliGRAM(s) Oral daily  metoprolol succinate  milliGRAM(s) Oral daily  polyethylene glycol 3350 17 Gram(s) Oral two times a day  QUEtiapine 100 milliGRAM(s) Oral at bedtime  risperiDONE   Tablet 1 milliGRAM(s) Oral daily  risperiDONE   Tablet 3 milliGRAM(s) Oral at bedtime  senna 2 Tablet(s) Oral at bedtime    MEDICATIONS  (PRN):  bisacodyl Suppository 10 milliGRAM(s) Rectal daily PRN Constipation

## 2021-06-10 NOTE — PROGRESS NOTE ADULT - ASSESSMENT
Acute on chronic systolic HF /fluid overload     Patient remains fluid overloaded on exam  Get BMP daily   Continue furosemide 40 mg iv BID  Continue BB  Maintain potassium >4.0, Mg >2.1  Strict intake and output  PT evaluation   Daily weight   Will continue to follow

## 2021-06-10 NOTE — PROGRESS NOTE ADULT - SUBJECTIVE AND OBJECTIVE BOX
WILLIAN MARY 56y Female  MRN#: [id number]   CODE STATUS:FULL      SUBJECTIVE  Patient is a 56y old Female who presents with a chief complaint of CHF exacerbation (09 Jun 2021 15:29)  Currently admitted to medicine with the primary diagnosis of CHF exacerbation    Patient examined at bedside. No events overnight.      OBJECTIVE  PAST MEDICAL & SURGICAL HISTORY  Hypertension    Hyperlipidemia    Anxiety and depression    COPD, severe    CHF (congestive heart failure)    Cerebrovascular accident (CVA)  Multiple    Type 2 diabetes mellitus    CKD (chronic kidney disease), stage II    No significant past surgical history      ALLERGIES:  No Known Allergies    MEDICATIONS:  STANDING MEDICATIONS  albuterol/ipratropium for Nebulization. 3 milliLiter(s) Nebulizer once  aspirin  chewable 81 milliGRAM(s) Oral daily  atorvastatin 80 milliGRAM(s) Oral at bedtime  budesonide 160 MICROgram(s)/formoterol 4.5 MICROgram(s) Inhaler 2 Puff(s) Inhalation two times a day  busPIRone 10 milliGRAM(s) Oral three times a day  chlorhexidine 4% Liquid 1 Application(s) Topical <User Schedule>  clopidogrel Tablet 75 milliGRAM(s) Oral daily  dextrose 40% Gel 15 Gram(s) Oral once  dextrose 5%. 1000 milliLiter(s) IV Continuous <Continuous>  dextrose 5%. 1000 milliLiter(s) IV Continuous <Continuous>  dextrose 50% Injectable 25 Gram(s) IV Push once  dextrose 50% Injectable 12.5 Gram(s) IV Push once  dextrose 50% Injectable 25 Gram(s) IV Push once  enoxaparin Injectable 40 milliGRAM(s) SubCutaneous daily  furosemide   Injectable 40 milliGRAM(s) IV Push every 12 hours  glucagon  Injectable 1 milliGRAM(s) IntraMuscular once  insulin glargine Injectable (LANTUS) 12 Unit(s) SubCutaneous at bedtime  insulin lispro (ADMELOG) corrective regimen sliding scale   SubCutaneous three times a day before meals  insulin lispro Injectable (ADMELOG) 4 Unit(s) SubCutaneous three times a day before meals  lisinopril 40 milliGRAM(s) Oral daily  magnesium sulfate  IVPB 2 Gram(s) IV Intermittent once  metoprolol succinate  milliGRAM(s) Oral daily  polyethylene glycol 3350 17 Gram(s) Oral two times a day  QUEtiapine 100 milliGRAM(s) Oral at bedtime  risperiDONE   Tablet 1 milliGRAM(s) Oral daily  risperiDONE   Tablet 3 milliGRAM(s) Oral at bedtime  senna 2 Tablet(s) Oral at bedtime    PRN MEDICATIONS  bisacodyl Suppository 10 milliGRAM(s) Rectal daily PRN      VITAL SIGNS: Last 24 Hours  T(C): 36.3 (10 Davin 2021 05:29), Max: 36.7 (09 Jun 2021 20:56)  T(F): 97.3 (10 Davin 2021 05:29), Max: 98 (09 Jun 2021 20:56)  HR: 73 (10 Davin 2021 05:29) (73 - 83)  BP: 119/79 (10 Davin 2021 05:29) (119/79 - 141/83)  BP(mean): --  RR: 16 (10 Davin 2021 05:29) (16 - 18)  SpO2: 97% (10 Davin 2021 08:27) (96% - 97%)    LABS:                        12.5   4.52  )-----------( 228      ( 10 Davin 2021 06:20 )             40.5     06-10    140  |  101  |  23<H>  ----------------------------<  134<H>  4.0   |  30  |  1.0    Ca    8.7      10 Davin 2021 06:20  Mg     1.7     06-10    TPro  6.0  /  Alb  3.3<L>  /  TBili  0.6  /  DBili  x   /  AST  11  /  ALT  6   /  AlkPhos  104  06-10        PHYSICAL EXAM:    GENERAL: NAD, well-developed, AAOx3  HEENT:  Atraumatic, Normocephalic. EOMI, PERRLA, conjunctiva and sclera clear, No JVD  PULMONARY: Expiratory wheeze B/L lung field  CARDIOVASCULAR: Regular rate and rhythm; No murmurs, rubs, or gallops  GASTROINTESTINAL: Soft, Nontender, Nondistended; Bowel sounds present  MUSCULOSKELETAL:  Edema improved today, mnimal  NEUROLOGY: non-focal  SKIN: No rashes or lesions    ASSESSMENT & PLAN    Patient is a 56 year old woman with PMH chronic hypoxic respiratory failure (on 4L home O2) secondary to  HFrEF 38%, HTN, HLD, DM2, CORNELIUS on CPAP, CVA with residual LE weakness (2014, wheelchair bound),  COPD, depression who presents of worsening shortness of breath and increase weight with  B/L LE x 2weeks. Admitted for acute on chronic CHF exacerbation.    1) Acute on chronic CHF exacerbation with resolved acute on chronic hypoxic respiratory failure  - Currently on 5L NC (on 4L at home)  - BNP 6/6: 7726  - CXR 6/6: cardiomegaly and bilateral interstitial opacities  - Continue IV lasix 40mg q12h  - Daily weights/ strict I+O/ fluid restrict  - Continue lisinopril 40mg q24hrs, metoprolol succinate 200mg q24hrs  - Heart failure consult appreciated   - Echo 2/2020: EF 38%, grade II diastolic dysfunction    2) DM II  - Lantus 12 qhs, lispro 4/4/4  - 6/7/21 A1c 6.5    3) HLD  - Continue atorvastatin 80mg qhs  - Lipid panel: LDL 42, HDL 29, triglycerides 183     4) COPD/pulmonary HTN  - Continue inhalers  -Start duonebs    5) CORNELIUS/OHS ? on CPAP  - CPAP at night  - Outpatient pulm follow-up    6) CVA with residual LE weakness  - c/w ASA/plavix/statin    7) Depression/ mood disorder  - Continue risperidone 3mg qhs, quetiapine 100mg qhs, buspirone 10mg TID    MISC:  - DVT ppx: lovenox  - PPI ppx: NA  - DIET: DASH carb consistent, fluid restriction  - Code Status: Full Code

## 2021-06-11 LAB
ALBUMIN SERPL ELPH-MCNC: 3.3 G/DL — LOW (ref 3.5–5.2)
ALP SERPL-CCNC: 100 U/L — SIGNIFICANT CHANGE UP (ref 30–115)
ALT FLD-CCNC: 6 U/L — SIGNIFICANT CHANGE UP (ref 0–41)
ANION GAP SERPL CALC-SCNC: 12 MMOL/L — SIGNIFICANT CHANGE UP (ref 7–14)
AST SERPL-CCNC: 14 U/L — SIGNIFICANT CHANGE UP (ref 0–41)
BASOPHILS # BLD AUTO: 0.08 K/UL — SIGNIFICANT CHANGE UP (ref 0–0.2)
BASOPHILS NFR BLD AUTO: 1.6 % — HIGH (ref 0–1)
BILIRUB SERPL-MCNC: 0.5 MG/DL — SIGNIFICANT CHANGE UP (ref 0.2–1.2)
BUN SERPL-MCNC: 22 MG/DL — HIGH (ref 10–20)
CALCIUM SERPL-MCNC: 9.2 MG/DL — SIGNIFICANT CHANGE UP (ref 8.5–10.1)
CHLORIDE SERPL-SCNC: 99 MMOL/L — SIGNIFICANT CHANGE UP (ref 98–110)
CO2 SERPL-SCNC: 31 MMOL/L — SIGNIFICANT CHANGE UP (ref 17–32)
CREAT SERPL-MCNC: 0.8 MG/DL — SIGNIFICANT CHANGE UP (ref 0.7–1.5)
EOSINOPHIL # BLD AUTO: 0.1 K/UL — SIGNIFICANT CHANGE UP (ref 0–0.7)
EOSINOPHIL NFR BLD AUTO: 2 % — SIGNIFICANT CHANGE UP (ref 0–8)
GLUCOSE BLDC GLUCOMTR-MCNC: 109 MG/DL — HIGH (ref 70–99)
GLUCOSE BLDC GLUCOMTR-MCNC: 115 MG/DL — HIGH (ref 70–99)
GLUCOSE BLDC GLUCOMTR-MCNC: 126 MG/DL — HIGH (ref 70–99)
GLUCOSE BLDC GLUCOMTR-MCNC: 152 MG/DL — HIGH (ref 70–99)
GLUCOSE SERPL-MCNC: 111 MG/DL — HIGH (ref 70–99)
HCT VFR BLD CALC: 40.5 % — SIGNIFICANT CHANGE UP (ref 37–47)
HGB BLD-MCNC: 12.7 G/DL — SIGNIFICANT CHANGE UP (ref 12–16)
IMM GRANULOCYTES NFR BLD AUTO: 0.4 % — HIGH (ref 0.1–0.3)
LYMPHOCYTES # BLD AUTO: 1.54 K/UL — SIGNIFICANT CHANGE UP (ref 1.2–3.4)
LYMPHOCYTES # BLD AUTO: 30.1 % — SIGNIFICANT CHANGE UP (ref 20.5–51.1)
MAGNESIUM SERPL-MCNC: 1.7 MG/DL — LOW (ref 1.8–2.4)
MCHC RBC-ENTMCNC: 28.1 PG — SIGNIFICANT CHANGE UP (ref 27–31)
MCHC RBC-ENTMCNC: 31.4 G/DL — LOW (ref 32–37)
MCV RBC AUTO: 89.6 FL — SIGNIFICANT CHANGE UP (ref 81–99)
MONOCYTES # BLD AUTO: 0.4 K/UL — SIGNIFICANT CHANGE UP (ref 0.1–0.6)
MONOCYTES NFR BLD AUTO: 7.8 % — SIGNIFICANT CHANGE UP (ref 1.7–9.3)
NEUTROPHILS # BLD AUTO: 2.98 K/UL — SIGNIFICANT CHANGE UP (ref 1.4–6.5)
NEUTROPHILS NFR BLD AUTO: 58.1 % — SIGNIFICANT CHANGE UP (ref 42.2–75.2)
NRBC # BLD: 0 /100 WBCS — SIGNIFICANT CHANGE UP (ref 0–0)
PLATELET # BLD AUTO: 266 K/UL — SIGNIFICANT CHANGE UP (ref 130–400)
POTASSIUM SERPL-MCNC: 3.7 MMOL/L — SIGNIFICANT CHANGE UP (ref 3.5–5)
POTASSIUM SERPL-SCNC: 3.7 MMOL/L — SIGNIFICANT CHANGE UP (ref 3.5–5)
PROT SERPL-MCNC: 6.2 G/DL — SIGNIFICANT CHANGE UP (ref 6–8)
RBC # BLD: 4.52 M/UL — SIGNIFICANT CHANGE UP (ref 4.2–5.4)
RBC # FLD: 18.3 % — HIGH (ref 11.5–14.5)
SODIUM SERPL-SCNC: 142 MMOL/L — SIGNIFICANT CHANGE UP (ref 135–146)
WBC # BLD: 5.12 K/UL — SIGNIFICANT CHANGE UP (ref 4.8–10.8)
WBC # FLD AUTO: 5.12 K/UL — SIGNIFICANT CHANGE UP (ref 4.8–10.8)

## 2021-06-11 PROCEDURE — 99233 SBSQ HOSP IP/OBS HIGH 50: CPT

## 2021-06-11 RX ORDER — LOSARTAN POTASSIUM 100 MG/1
100 TABLET, FILM COATED ORAL DAILY
Refills: 0 | Status: DISCONTINUED | OUTPATIENT
Start: 2021-06-11 | End: 2021-06-16

## 2021-06-11 RX ORDER — MAGNESIUM SULFATE 500 MG/ML
2 VIAL (ML) INJECTION ONCE
Refills: 0 | Status: DISCONTINUED | OUTPATIENT
Start: 2021-06-11 | End: 2021-06-11

## 2021-06-11 RX ADMIN — CHLORHEXIDINE GLUCONATE 1 APPLICATION(S): 213 SOLUTION TOPICAL at 05:15

## 2021-06-11 RX ADMIN — Medication 40 MILLIGRAM(S): at 05:15

## 2021-06-11 RX ADMIN — LISINOPRIL 40 MILLIGRAM(S): 2.5 TABLET ORAL at 05:15

## 2021-06-11 RX ADMIN — Medication 4 UNIT(S): at 11:59

## 2021-06-11 RX ADMIN — Medication 4 UNIT(S): at 08:06

## 2021-06-11 RX ADMIN — RISPERIDONE 3 MILLIGRAM(S): 4 TABLET ORAL at 22:35

## 2021-06-11 RX ADMIN — Medication 200 MILLIGRAM(S): at 05:15

## 2021-06-11 RX ADMIN — INSULIN GLARGINE 12 UNIT(S): 100 INJECTION, SOLUTION SUBCUTANEOUS at 22:31

## 2021-06-11 RX ADMIN — Medication 10 MILLIGRAM(S): at 14:54

## 2021-06-11 RX ADMIN — Medication 10 MILLIGRAM(S): at 05:15

## 2021-06-11 RX ADMIN — CLOPIDOGREL BISULFATE 75 MILLIGRAM(S): 75 TABLET, FILM COATED ORAL at 12:00

## 2021-06-11 RX ADMIN — Medication 4 UNIT(S): at 17:03

## 2021-06-11 RX ADMIN — Medication 81 MILLIGRAM(S): at 12:00

## 2021-06-11 RX ADMIN — BUDESONIDE AND FORMOTEROL FUMARATE DIHYDRATE 2 PUFF(S): 160; 4.5 AEROSOL RESPIRATORY (INHALATION) at 08:07

## 2021-06-11 RX ADMIN — Medication 1: at 17:02

## 2021-06-11 RX ADMIN — Medication 10 MILLIGRAM(S): at 22:34

## 2021-06-11 RX ADMIN — Medication 40 MILLIGRAM(S): at 17:01

## 2021-06-11 RX ADMIN — ENOXAPARIN SODIUM 40 MILLIGRAM(S): 100 INJECTION SUBCUTANEOUS at 12:01

## 2021-06-11 RX ADMIN — RISPERIDONE 1 MILLIGRAM(S): 4 TABLET ORAL at 12:00

## 2021-06-11 RX ADMIN — QUETIAPINE FUMARATE 100 MILLIGRAM(S): 200 TABLET, FILM COATED ORAL at 22:34

## 2021-06-11 RX ADMIN — ATORVASTATIN CALCIUM 80 MILLIGRAM(S): 80 TABLET, FILM COATED ORAL at 22:34

## 2021-06-11 NOTE — PROGRESS NOTE ADULT - SUBJECTIVE AND OBJECTIVE BOX
WILLIAN MARY  56y  Female      Patient is a 56y old  Female who presents with a chief complaint of CHF exacerbation (11 Jun 2021 14:10)      INTERVAL HPI/OVERNIGHT EVENTS:  She feels better, SOB is improving.   Vital Signs Last 24 Hrs  T(C): 35.6 (11 Jun 2021 13:03), Max: 36.4 (10 Davin 2021 20:49)  T(F): 96 (11 Jun 2021 13:03), Max: 97.6 (10 Davin 2021 20:49)  HR: 72 (11 Jun 2021 13:03) (72 - 73)  BP: 138/93 (11 Jun 2021 13:03) (129/84 - 138/93)  BP(mean): --  RR: 18 (11 Jun 2021 13:03) (16 - 18)  SpO2: 96% (11 Jun 2021 07:55) (96% - 96%)      06-10-21 @ 07:01  -  06-11-21 @ 07:00  --------------------------------------------------------  IN: 710 mL / OUT: 2500 mL / NET: -1790 mL    06-11-21 @ 07:01  -  06-11-21 @ 18:19  --------------------------------------------------------  IN: 400 mL / OUT: 2000 mL / NET: -1600 mL            Consultant(s) Notes Reviewed:  [x ] YES  [ ] NO          MEDICATIONS  (STANDING):  albuterol/ipratropium for Nebulization. 3 milliLiter(s) Nebulizer once  aspirin  chewable 81 milliGRAM(s) Oral daily  atorvastatin 80 milliGRAM(s) Oral at bedtime  budesonide 160 MICROgram(s)/formoterol 4.5 MICROgram(s) Inhaler 2 Puff(s) Inhalation two times a day  busPIRone 10 milliGRAM(s) Oral three times a day  chlorhexidine 4% Liquid 1 Application(s) Topical <User Schedule>  clopidogrel Tablet 75 milliGRAM(s) Oral daily  dextrose 40% Gel 15 Gram(s) Oral once  dextrose 5%. 1000 milliLiter(s) (50 mL/Hr) IV Continuous <Continuous>  dextrose 5%. 1000 milliLiter(s) (100 mL/Hr) IV Continuous <Continuous>  dextrose 50% Injectable 25 Gram(s) IV Push once  dextrose 50% Injectable 12.5 Gram(s) IV Push once  dextrose 50% Injectable 25 Gram(s) IV Push once  enoxaparin Injectable 40 milliGRAM(s) SubCutaneous daily  furosemide   Injectable 40 milliGRAM(s) IV Push every 12 hours  glucagon  Injectable 1 milliGRAM(s) IntraMuscular once  insulin glargine Injectable (LANTUS) 12 Unit(s) SubCutaneous at bedtime  insulin lispro (ADMELOG) corrective regimen sliding scale   SubCutaneous three times a day before meals  insulin lispro Injectable (ADMELOG) 4 Unit(s) SubCutaneous three times a day before meals  losartan 100 milliGRAM(s) Oral daily  metoprolol succinate  milliGRAM(s) Oral daily  polyethylene glycol 3350 17 Gram(s) Oral two times a day  QUEtiapine 100 milliGRAM(s) Oral at bedtime  risperiDONE   Tablet 1 milliGRAM(s) Oral daily  risperiDONE   Tablet 3 milliGRAM(s) Oral at bedtime  senna 2 Tablet(s) Oral at bedtime    MEDICATIONS  (PRN):  bisacodyl Suppository 10 milliGRAM(s) Rectal daily PRN Constipation      LABS                          12.7   5.12  )-----------( 266      ( 11 Jun 2021 06:47 )             40.5     06-11    142  |  99  |  22<H>  ----------------------------<  111<H>  3.7   |  31  |  0.8    Ca    9.2      11 Jun 2021 06:47  Mg     1.7     06-11    TPro  6.2  /  Alb  3.3<L>  /  TBili  0.5  /  DBili  x   /  AST  14  /  ALT  6   /  AlkPhos  100  06-11          Lactate Trend        CAPILLARY BLOOD GLUCOSE      POCT Blood Glucose.: 152 mg/dL (11 Jun 2021 16:47)        RADIOLOGY & ADDITIONAL TESTS:    Imaging Personally Reviewed:  [ ] YES  [ ] NO    HEALTH ISSUES - PROBLEM Dx:          PHYSICAL EXAM:  GENERAL: NAD, well-developed.  HEAD:  Atraumatic, Normocephalic.  EYES: EOMI, PERRLA, conjunctiva and sclera clear.  NECK: Supple, JVD  CHEST/LUNG: Bibasilar rales, scattered expiratory wheezing.    HEART: Regular rate and rhythm; S1 S2.   ABDOMEN: Soft, Nontender, Nondistended; Bowel sounds present.  EXTREMITIES: LE edema 2+,  PSYCH: AAOx3.  NEUROLOGY: non-focal.  SKIN: No rashes or lesions.

## 2021-06-11 NOTE — PROGRESS NOTE ADULT - ASSESSMENT
56 year old female with PMH of HTN, DM 2, HFrEF, COPD on 4L O2, CVA with residual LE weakness and depression came to ED for worsening SOB and LE edema for the last two weeks. She was not taking her Lasix.   In the ED, Chest X-ray shows pulm vascular congestion and cardiomegaly. BNP elevated. Started on IV Lasix. Pt hypoxic to 94% on NC    A/P:   Acute on chronic hypoxic respiratory failure  Acute HFrEF:   Patient with LE edema, SOB , Pro-BNO  CXR showed vascular congestion.   Echo LVEF 38%,   Continue Lasix 40mg IV BID, Metoprolol XL 200mg mg Daily and, switch Lisinopril to Losartan 100mg daily.   Cardiology recommended ischemic work up, will send nuclear stress test tomorrow.   Daily weight, fluid restriction 1.5L /day, low sodium diet.     Chronic Respiratory failure:   COPD  Chronic tobacco abuse:   Still with expiratory wheezing on exam.   Continue Symbicort, continue DuoNeb prn.     History of CVA with residual LE weakness  Continue ASA, Lipitor   Magnesium deficiency.    HTN: Continue lisinopril, Lasix and Metoprolol.    DM II: FS is controlled, A1C 6.8, continue Lantus 12 units and Pre-meals Lispro.     Depression: Continue Wellbutrin and Risperidone.    #Progress Note Handoff:  Pending (specify):  improving SOB and volume overload.   Family discussion:  need to continue Iv diuresis, daily weight. Counseled for smoking cessation.   Disposition: Home possible in 24-48 hrs.

## 2021-06-11 NOTE — PROGRESS NOTE ADULT - ASSESSMENT
Acute on chronic systolic HF /fluid overload     Patient remains fluid overloaded on exam  Get BMP daily   Continue furosemide 40 mg iv BID  Continue metoprolol  mg daily   Switch lisinopril to losartan 100 mg daily   Maintain potassium >4.0, Mg >2.1  Strict intake and output  Ischemic work up needed once euvolemic if not done in the past   PT evaluation   Daily weight   Discussed with primary team  Will continue to follow

## 2021-06-11 NOTE — PROGRESS NOTE ADULT - SUBJECTIVE AND OBJECTIVE BOX
WILLIAN MARY 56y Female  MRN#: [id number]   CODE STATUS:FULL      SUBJECTIVE  Patient is a 56y old Female who presents with a chief complaint of CHF exacerbation (10 Davin 2021 17:20)  Currently admitted to medicine with the primary diagnosis of CHF exacerbation    Patient seen at bedside. States she is breathing better. States that she did not participate in PT yesterday because she was too comfortable. No events overnight.       OBJECTIVE  PAST MEDICAL & SURGICAL HISTORY  Hypertension    Hyperlipidemia    Anxiety and depression    COPD, severe    CHF (congestive heart failure)    Cerebrovascular accident (CVA)  Multiple    Type 2 diabetes mellitus    CKD (chronic kidney disease), stage II    No significant past surgical history      ALLERGIES:  No Known Allergies    MEDICATIONS:  STANDING MEDICATIONS  albuterol/ipratropium for Nebulization. 3 milliLiter(s) Nebulizer once  aspirin  chewable 81 milliGRAM(s) Oral daily  atorvastatin 80 milliGRAM(s) Oral at bedtime  budesonide 160 MICROgram(s)/formoterol 4.5 MICROgram(s) Inhaler 2 Puff(s) Inhalation two times a day  busPIRone 10 milliGRAM(s) Oral three times a day  chlorhexidine 4% Liquid 1 Application(s) Topical <User Schedule>  clopidogrel Tablet 75 milliGRAM(s) Oral daily  dextrose 40% Gel 15 Gram(s) Oral once  dextrose 5%. 1000 milliLiter(s) IV Continuous <Continuous>  dextrose 5%. 1000 milliLiter(s) IV Continuous <Continuous>  dextrose 50% Injectable 25 Gram(s) IV Push once  dextrose 50% Injectable 12.5 Gram(s) IV Push once  dextrose 50% Injectable 25 Gram(s) IV Push once  enoxaparin Injectable 40 milliGRAM(s) SubCutaneous daily  furosemide   Injectable 40 milliGRAM(s) IV Push every 12 hours  glucagon  Injectable 1 milliGRAM(s) IntraMuscular once  insulin glargine Injectable (LANTUS) 12 Unit(s) SubCutaneous at bedtime  insulin lispro (ADMELOG) corrective regimen sliding scale   SubCutaneous three times a day before meals  insulin lispro Injectable (ADMELOG) 4 Unit(s) SubCutaneous three times a day before meals  lisinopril 40 milliGRAM(s) Oral daily  metoprolol succinate  milliGRAM(s) Oral daily  polyethylene glycol 3350 17 Gram(s) Oral two times a day  QUEtiapine 100 milliGRAM(s) Oral at bedtime  risperiDONE   Tablet 1 milliGRAM(s) Oral daily  risperiDONE   Tablet 3 milliGRAM(s) Oral at bedtime  senna 2 Tablet(s) Oral at bedtime    PRN MEDICATIONS  bisacodyl Suppository 10 milliGRAM(s) Rectal daily PRN      VITAL SIGNS: Last 24 Hours  T(C): 36.4 (11 Jun 2021 05:29), Max: 36.4 (10 Davin 2021 20:49)  T(F): 97.5 (11 Jun 2021 05:29), Max: 97.6 (10 Davin 2021 20:49)  HR: 73 (11 Jun 2021 05:29) (73 - 78)  BP: 129/90 (11 Jun 2021 05:29) (129/84 - 133/89)  BP(mean): --  RR: 17 (11 Jun 2021 05:29) (16 - 18)  SpO2: 96% (11 Jun 2021 07:55) (96% - 97%)    LABS:                        12.5   4.52  )-----------( 228      ( 10 Davin 2021 06:20 )             40.5     06-10    140  |  101  |  23<H>  ----------------------------<  134<H>  4.0   |  30  |  1.0    Ca    8.7      10 Davin 2021 06:20  Mg     1.7     06-10    TPro  6.0  /  Alb  3.3<L>  /  TBili  0.6  /  DBili  x   /  AST  11  /  ALT  6   /  AlkPhos  104  06-10        PHYSICAL EXAM:    GENERAL: NAD, well-developed, AAOx3  HEENT:  Atraumatic, Normocephalic. EOMI, PERRLA, conjunctiva and sclera clear, No JVD  PULMONARY: Expiratory wheeze B/L lung field  CARDIOVASCULAR: Regular rate and rhythm; No murmurs, rubs, or gallops  GASTROINTESTINAL: Soft, Nontender, Nondistended; Bowel sounds present  MUSCULOSKELETAL:  Mild swelling lower extremities, non-pitting  NEUROLOGY: non-focal  SKIN: No rashes or lesions    ASSESSMENT & PLAN      Patient is a 56 year old woman with PMH chronic hypoxic respiratory failure (on 4L home O2) secondary to  HFrEF 38%, HTN, HLD, DM2, CORNELIUS on CPAP, CVA with residual LE weakness (2014, wheelchair bound),  COPD, depression who presents of worsening shortness of breath and increase weight with  B/L LE x 2weeks. Admitted for acute on chronic CHF exacerbation.    1) Acute on chronic CHF exacerbation with resolved acute on chronic hypoxic respiratory failure  - Currently on 4L NC (on 4L at home)  - BNP 6/6: 7726  - CXR 6/6: cardiomegaly and bilateral interstitial opacities  - Continue IV lasix 40mg q12h  - Daily weights/ strict I+O/ fluid restrict  - Continue lisinopril 40mg q24hrs, metoprolol succinate 200mg q24hrs  - Heart failure consult appreciated   - Echo 2/2020: EF 38%, grade II diastolic dysfunction    2) DM II  - Lantus 12 qhs, lispro 4/4/4  - 6/7/21 A1c 6.5    3) HLD  - Continue atorvastatin 80mg qhs  - Lipid panel: LDL 42, HDL 29, triglycerides 183     4) COPD/pulmonary HTN  - Continue inhalers  -Start duonebs    5) CORNELIUS/OHS ? on CPAP  - CPAP at night  - Outpatient pulm follow-up    6) CVA with residual LE weakness  - c/w ASA/plavix/statin    7) Depression/ mood disorder  - Continue risperidone 3mg qhs, quetiapine 100mg qhs, buspirone 10mg TID    MISC:  - DVT ppx: lovenox  - PPI ppx: NA  - DIET: DASH carb consistent, fluid restriction  - Code Status: Full Code

## 2021-06-12 LAB
ALBUMIN SERPL ELPH-MCNC: 3.1 G/DL — LOW (ref 3.5–5.2)
ALP SERPL-CCNC: 91 U/L — SIGNIFICANT CHANGE UP (ref 30–115)
ALT FLD-CCNC: 7 U/L — SIGNIFICANT CHANGE UP (ref 0–41)
ANION GAP SERPL CALC-SCNC: 6 MMOL/L — LOW (ref 7–14)
AST SERPL-CCNC: 14 U/L — SIGNIFICANT CHANGE UP (ref 0–41)
BASOPHILS # BLD AUTO: 0.06 K/UL — SIGNIFICANT CHANGE UP (ref 0–0.2)
BASOPHILS NFR BLD AUTO: 1.2 % — HIGH (ref 0–1)
BILIRUB SERPL-MCNC: 0.5 MG/DL — SIGNIFICANT CHANGE UP (ref 0.2–1.2)
BUN SERPL-MCNC: 21 MG/DL — HIGH (ref 10–20)
CALCIUM SERPL-MCNC: 8.9 MG/DL — SIGNIFICANT CHANGE UP (ref 8.5–10.1)
CHLORIDE SERPL-SCNC: 101 MMOL/L — SIGNIFICANT CHANGE UP (ref 98–110)
CO2 SERPL-SCNC: 34 MMOL/L — HIGH (ref 17–32)
CREAT SERPL-MCNC: 0.9 MG/DL — SIGNIFICANT CHANGE UP (ref 0.7–1.5)
DOXEPIN SERPL-MCNC: <5 MCG/L — SIGNIFICANT CHANGE UP
DOXEPIN SPEC-SCNC: <5 MCG/L — LOW (ref 110–250)
EOSINOPHIL # BLD AUTO: 0.14 K/UL — SIGNIFICANT CHANGE UP (ref 0–0.7)
EOSINOPHIL NFR BLD AUTO: 2.9 % — SIGNIFICANT CHANGE UP (ref 0–8)
GLUCOSE BLDC GLUCOMTR-MCNC: 139 MG/DL — HIGH (ref 70–99)
GLUCOSE BLDC GLUCOMTR-MCNC: 142 MG/DL — HIGH (ref 70–99)
GLUCOSE BLDC GLUCOMTR-MCNC: 167 MG/DL — HIGH (ref 70–99)
GLUCOSE BLDC GLUCOMTR-MCNC: 193 MG/DL — HIGH (ref 70–99)
GLUCOSE SERPL-MCNC: 107 MG/DL — HIGH (ref 70–99)
HCT VFR BLD CALC: 38.7 % — SIGNIFICANT CHANGE UP (ref 37–47)
HGB BLD-MCNC: 11.7 G/DL — LOW (ref 12–16)
IMM GRANULOCYTES NFR BLD AUTO: 0.4 % — HIGH (ref 0.1–0.3)
LYMPHOCYTES # BLD AUTO: 1.37 K/UL — SIGNIFICANT CHANGE UP (ref 1.2–3.4)
LYMPHOCYTES # BLD AUTO: 28.4 % — SIGNIFICANT CHANGE UP (ref 20.5–51.1)
MAGNESIUM SERPL-MCNC: 1.7 MG/DL — LOW (ref 1.8–2.4)
MCHC RBC-ENTMCNC: 27.3 PG — SIGNIFICANT CHANGE UP (ref 27–31)
MCHC RBC-ENTMCNC: 30.2 G/DL — LOW (ref 32–37)
MCV RBC AUTO: 90.2 FL — SIGNIFICANT CHANGE UP (ref 81–99)
MONOCYTES # BLD AUTO: 0.33 K/UL — SIGNIFICANT CHANGE UP (ref 0.1–0.6)
MONOCYTES NFR BLD AUTO: 6.8 % — SIGNIFICANT CHANGE UP (ref 1.7–9.3)
NEUTROPHILS # BLD AUTO: 2.91 K/UL — SIGNIFICANT CHANGE UP (ref 1.4–6.5)
NEUTROPHILS NFR BLD AUTO: 60.3 % — SIGNIFICANT CHANGE UP (ref 42.2–75.2)
NORDOXEPIN SERPL-MCNC: <5 MCG/L — SIGNIFICANT CHANGE UP
NRBC # BLD: 0 /100 WBCS — SIGNIFICANT CHANGE UP (ref 0–0)
PLAT MORPH BLD: NORMAL — SIGNIFICANT CHANGE UP
PLATELET # BLD AUTO: 246 K/UL — SIGNIFICANT CHANGE UP (ref 130–400)
POTASSIUM SERPL-MCNC: 3.8 MMOL/L — SIGNIFICANT CHANGE UP (ref 3.5–5)
POTASSIUM SERPL-SCNC: 3.8 MMOL/L — SIGNIFICANT CHANGE UP (ref 3.5–5)
PROT SERPL-MCNC: 5.8 G/DL — LOW (ref 6–8)
RBC # BLD: 4.29 M/UL — SIGNIFICANT CHANGE UP (ref 4.2–5.4)
RBC # FLD: 18.3 % — HIGH (ref 11.5–14.5)
RBC BLD AUTO: NORMAL — SIGNIFICANT CHANGE UP
SODIUM SERPL-SCNC: 141 MMOL/L — SIGNIFICANT CHANGE UP (ref 135–146)
WBC # BLD: 4.83 K/UL — SIGNIFICANT CHANGE UP (ref 4.8–10.8)
WBC # FLD AUTO: 4.83 K/UL — SIGNIFICANT CHANGE UP (ref 4.8–10.8)

## 2021-06-12 PROCEDURE — 99233 SBSQ HOSP IP/OBS HIGH 50: CPT

## 2021-06-12 RX ORDER — MAGNESIUM OXIDE 400 MG ORAL TABLET 241.3 MG
400 TABLET ORAL
Refills: 0 | Status: DISCONTINUED | OUTPATIENT
Start: 2021-06-12 | End: 2021-06-22

## 2021-06-12 RX ADMIN — CHLORHEXIDINE GLUCONATE 1 APPLICATION(S): 213 SOLUTION TOPICAL at 06:43

## 2021-06-12 RX ADMIN — ATORVASTATIN CALCIUM 80 MILLIGRAM(S): 80 TABLET, FILM COATED ORAL at 21:47

## 2021-06-12 RX ADMIN — POLYETHYLENE GLYCOL 3350 17 GRAM(S): 17 POWDER, FOR SOLUTION ORAL at 18:45

## 2021-06-12 RX ADMIN — MAGNESIUM OXIDE 400 MG ORAL TABLET 400 MILLIGRAM(S): 241.3 TABLET ORAL at 18:43

## 2021-06-12 RX ADMIN — SENNA PLUS 2 TABLET(S): 8.6 TABLET ORAL at 21:47

## 2021-06-12 RX ADMIN — Medication 10 MILLIGRAM(S): at 06:42

## 2021-06-12 RX ADMIN — RISPERIDONE 3 MILLIGRAM(S): 4 TABLET ORAL at 21:47

## 2021-06-12 RX ADMIN — Medication 4 UNIT(S): at 08:25

## 2021-06-12 RX ADMIN — Medication 81 MILLIGRAM(S): at 11:21

## 2021-06-12 RX ADMIN — Medication 10 MILLIGRAM(S): at 16:00

## 2021-06-12 RX ADMIN — ENOXAPARIN SODIUM 40 MILLIGRAM(S): 100 INJECTION SUBCUTANEOUS at 11:22

## 2021-06-12 RX ADMIN — Medication 4 UNIT(S): at 11:25

## 2021-06-12 RX ADMIN — Medication 200 MILLIGRAM(S): at 06:41

## 2021-06-12 RX ADMIN — RISPERIDONE 1 MILLIGRAM(S): 4 TABLET ORAL at 11:21

## 2021-06-12 RX ADMIN — INSULIN GLARGINE 12 UNIT(S): 100 INJECTION, SOLUTION SUBCUTANEOUS at 21:47

## 2021-06-12 RX ADMIN — CLOPIDOGREL BISULFATE 75 MILLIGRAM(S): 75 TABLET, FILM COATED ORAL at 11:21

## 2021-06-12 RX ADMIN — Medication 4 UNIT(S): at 18:45

## 2021-06-12 RX ADMIN — MAGNESIUM OXIDE 400 MG ORAL TABLET 400 MILLIGRAM(S): 241.3 TABLET ORAL at 11:26

## 2021-06-12 RX ADMIN — BUDESONIDE AND FORMOTEROL FUMARATE DIHYDRATE 2 PUFF(S): 160; 4.5 AEROSOL RESPIRATORY (INHALATION) at 08:25

## 2021-06-12 RX ADMIN — LOSARTAN POTASSIUM 100 MILLIGRAM(S): 100 TABLET, FILM COATED ORAL at 06:41

## 2021-06-12 RX ADMIN — Medication 40 MILLIGRAM(S): at 18:45

## 2021-06-12 RX ADMIN — Medication 40 MILLIGRAM(S): at 06:43

## 2021-06-12 RX ADMIN — QUETIAPINE FUMARATE 100 MILLIGRAM(S): 200 TABLET, FILM COATED ORAL at 21:47

## 2021-06-12 RX ADMIN — Medication 10 MILLIGRAM(S): at 21:47

## 2021-06-12 NOTE — PHYSICAL THERAPY INITIAL EVALUATION ADULT - ADDITIONAL COMMENTS
Lives alone in elevator building, HHA 6hrs/day X 7 days, w/c bound mostly except very limited amb with RW as per pt.
+home O2  pt reported that she was mostly in her w/c PTA, amb short distances only in her apt, pt was able to transfer in and out of w/c to bed or toilet with assist from HHA
pt reports using her rollator in her apartment for short distances and her wc for long distances

## 2021-06-12 NOTE — PHYSICAL THERAPY INITIAL EVALUATION ADULT - RANGE OF MOTION EXAMINATION, REHAB EVAL
B UE WFL, R LE AAROM hip/knee flex WFL, < 1/4 range AROM, L LE PROM WFL, minimal ankle DF B
bilateral upper extremity ROM was WFL (within functional limits)/bilateral lower extremity ROM was WFL (within functional limits)

## 2021-06-12 NOTE — PHYSICAL THERAPY INITIAL EVALUATION ADULT - LIVES WITH, PROFILE
in apt with elevator/alone
Per patient, she lives with a friend who is there 24 hours and is her HHA for 6 of those 24 hrs. Pt lives in a 6th floor apartment with elevator

## 2021-06-12 NOTE — PHYSICAL THERAPY INITIAL EVALUATION ADULT - TRANSFER TRAINING, PT EVAL
Goal: Pt will come sit to stand to sit with contact guard by discharge to facilitate return to PLOF.

## 2021-06-12 NOTE — PHYSICAL THERAPY INITIAL EVALUATION ADULT - GENERAL OBSERVATIONS, REHAB EVAL
Educated patient the importance of exercises. Patient however, still refused to participate.
Chart reviewed, pt encountered supine, NAD, declined PT IE and further services. Pt states she has limited mobility at baseline and will not have any issues for basic transfers and short distance amb at d/c. Pt also reports she has [facility] services 6hrs/dayX 7days. Has RW, W/C and Hospital bed. When asked if she has been OOB during hospital stay, pt states she hasn't gotten OOB, but again declined to attempt with PT.  PT educated pt significance of PT assessment to enhance d/c care however pt declined and states she doesn't want PT services in hospital. Pt educated to maintain ROM, joint integrity through ROM exs, OOB with Nursing assistance as dash, verbalizes understanding. Will discontinue PT services at this time as per pt's request, please reconsult if any changes.
Chart reviewed, attempted to see pt for PT IE, pt previuosly declined PT services, but has now changed her mind, pt rec in bed in NAD, pt wanting to start PT, but declined for today, not really stating a reason why, social hx obtained, PT educated pt on supine UE/LE therex, pt verbalized understanding, pt left as found in NAD, will f/u
pt. encountered in bed in NAD willing to participate in PT. + IV lock, + primafit, +4LO2 via nc [phone]

## 2021-06-12 NOTE — PHYSICAL THERAPY INITIAL EVALUATION ADULT - PERTINENT HX OF CURRENT PROBLEM, REHAB EVAL
pt adm for CHF
56 year old female with a pmhx of chronic hypoxic respiratory failure (on 4L home O2) secondary to  HFrEF 38% (last exacerbation in 2020) , HTN, HLD, DM2, active smoker , CVA with residual LE weakness (2014, wheelchair bound),  COPD , mood /depression,  presented to ED with worsening shortness of breath and increase weight with  B/L LE x 2weeks prior. Pt reported that she had not been taking her meds.

## 2021-06-12 NOTE — PHYSICAL THERAPY INITIAL EVALUATION ADULT - GAIT TRAINING, PT EVAL
Goal: pt will ambulate with rolling walker and contact guard x 10 feet by discharge to facilitate return to PLOF.

## 2021-06-12 NOTE — PHYSICAL THERAPY INITIAL EVALUATION ADULT - AMBULATION SKILLS, REHAB EVAL
needed assist/needs device
pt primarily used motorized w/c, has rollator and hospital bed/needed assist/needs device

## 2021-06-12 NOTE — PROGRESS NOTE ADULT - ASSESSMENT
56 year old female with PMH of HTN, DM 2, HFrEF, COPD on 4L O2, CVA with residual LE weakness and depression came to ED for worsening SOB and LE edema for the last two weeks. She was not taking her Lasix.   In the ED, Chest X-ray shows pulm vascular congestion and cardiomegaly. BNP elevated. Started on IV Lasix. Pt hypoxic to 94% on NC    A/P:   Acute on chronic hypoxic respiratory failure  Acute HFrEF:   Patient with LE edema, SOB , Pro-BNO  CXR showed vascular congestion.   Echo LVEF 38%, grade II diastolic dysfunction, mild to mod MR, mild to mod TR, mild AR, mod Pulm HTN.   Continue Lasix 40mg IV BID, Metoprolol XL 200mg mg Daily and, switch Lisinopril to Losartan 100mg daily.   Cardiology recommended ischemic work up, patient reports she has cardiac cath 6 months ago at [facility], no   Daily weight, fluid restriction 1.5L /day, low sodium diet.     Chronic Respiratory failure:   COPD  Chronic tobacco abuse:   Still with expiratory wheezing on exam.   Continue Symbicort, continue DuoNeb prn.     History of CVA with residual LE weakness  Continue ASA, Lipitor   Magnesium deficiency.    HTN: Continue lisinopril, Lasix and Metoprolol.    DM II: FS is controlled, A1C 6.8, continue Lantus 12 units and Pre-meals Lispro.     Depression: Continue Wellbutrin and Risperidone.    #Progress Note Handoff:  Pending (specify):  improving SOB and volume overload.   Family discussion:  need to continue Iv diuresis, daily weight. Counseled for smoking cessation.   Disposition: Home possible in 24-48 hrs

## 2021-06-12 NOTE — PROGRESS NOTE ADULT - SUBJECTIVE AND OBJECTIVE BOX
WILLIAN MARY  56y  Female      Patient is a 56y old  Female who presents with a chief complaint of CHF exacerbation (11 Jun 2021 18:18)      INTERVAL HPI/OVERNIGHT EVENTS:  She feels better, no new complaints.   Vital Signs Last 24 Hrs  T(C): 35.8 (12 Jun 2021 13:20), Max: 36.5 (11 Jun 2021 20:46)  T(F): 96.5 (12 Jun 2021 13:20), Max: 97.7 (11 Jun 2021 20:46)  HR: 113 (12 Jun 2021 13:20) (66 - 113)  BP: 117/72 (12 Jun 2021 13:20) (117/72 - 145/83)  BP(mean): --  RR: 18 (12 Jun 2021 13:20) (16 - 18)  SpO2: --      06-11-21 @ 07:01  -  06-12-21 @ 07:00  --------------------------------------------------------  IN: 400 mL / OUT: 3325 mL / NET: -2925 mL    06-12-21 @ 07:01  -  06-12-21 @ 15:16  --------------------------------------------------------  IN: 440 mL / OUT: 1900 mL / NET: -1460 mL            Consultant(s) Notes Reviewed:  [x ] YES  [ ] NO          MEDICATIONS  (STANDING):  albuterol/ipratropium for Nebulization. 3 milliLiter(s) Nebulizer once  aspirin  chewable 81 milliGRAM(s) Oral daily  atorvastatin 80 milliGRAM(s) Oral at bedtime  budesonide 160 MICROgram(s)/formoterol 4.5 MICROgram(s) Inhaler 2 Puff(s) Inhalation two times a day  busPIRone 10 milliGRAM(s) Oral three times a day  chlorhexidine 4% Liquid 1 Application(s) Topical <User Schedule>  clopidogrel Tablet 75 milliGRAM(s) Oral daily  dextrose 40% Gel 15 Gram(s) Oral once  dextrose 5%. 1000 milliLiter(s) (50 mL/Hr) IV Continuous <Continuous>  dextrose 5%. 1000 milliLiter(s) (100 mL/Hr) IV Continuous <Continuous>  dextrose 50% Injectable 25 Gram(s) IV Push once  dextrose 50% Injectable 12.5 Gram(s) IV Push once  dextrose 50% Injectable 25 Gram(s) IV Push once  enoxaparin Injectable 40 milliGRAM(s) SubCutaneous daily  furosemide   Injectable 40 milliGRAM(s) IV Push every 12 hours  glucagon  Injectable 1 milliGRAM(s) IntraMuscular once  insulin glargine Injectable (LANTUS) 12 Unit(s) SubCutaneous at bedtime  insulin lispro (ADMELOG) corrective regimen sliding scale   SubCutaneous three times a day before meals  insulin lispro Injectable (ADMELOG) 4 Unit(s) SubCutaneous three times a day before meals  losartan 100 milliGRAM(s) Oral daily  magnesium oxide 400 milliGRAM(s) Oral three times a day with meals  metoprolol succinate  milliGRAM(s) Oral daily  polyethylene glycol 3350 17 Gram(s) Oral two times a day  QUEtiapine 100 milliGRAM(s) Oral at bedtime  risperiDONE   Tablet 1 milliGRAM(s) Oral daily  risperiDONE   Tablet 3 milliGRAM(s) Oral at bedtime  senna 2 Tablet(s) Oral at bedtime    MEDICATIONS  (PRN):  bisacodyl Suppository 10 milliGRAM(s) Rectal daily PRN Constipation      LABS                          11.7   4.83  )-----------( 246      ( 12 Jun 2021 06:14 )             38.7     06-12    141  |  101  |  21<H>  ----------------------------<  107<H>  3.8   |  34<H>  |  0.9    Ca    8.9      12 Jun 2021 06:14  Mg     1.7     06-12    TPro  5.8<L>  /  Alb  3.1<L>  /  TBili  0.5  /  DBili  x   /  AST  14  /  ALT  7   /  AlkPhos  91  06-12          Lactate Trend        CAPILLARY BLOOD GLUCOSE      POCT Blood Glucose.: 142 mg/dL (12 Jun 2021 11:24)        RADIOLOGY & ADDITIONAL TESTS:    Imaging Personally Reviewed:  [ ] YES  [ ] NO    HEALTH ISSUES - PROBLEM Dx:          PHYSICAL EXAM:  GENERAL: NAD, well-developed.  HEAD:  Atraumatic, Normocephalic.  EYES: EOMI, PERRLA, conjunctiva and sclera clear.  NECK: Supple, JVD  CHEST/LUNG: Bibasilar rales, scattered expiratory wheezing.    HEART: Regular rate and rhythm; S1 S2.   ABDOMEN: Soft, Nontender, Nondistended; Bowel sounds present.  EXTREMITIES: LE edema 2+,  PSYCH: AAOx3.  NEUROLOGY: non-focal.  SKIN: No rashes or lesions.

## 2021-06-13 LAB
ALBUMIN SERPL ELPH-MCNC: 3.5 G/DL — SIGNIFICANT CHANGE UP (ref 3.5–5.2)
ALP SERPL-CCNC: 111 U/L — SIGNIFICANT CHANGE UP (ref 30–115)
ALT FLD-CCNC: 9 U/L — SIGNIFICANT CHANGE UP (ref 0–41)
ANION GAP SERPL CALC-SCNC: 10 MMOL/L — SIGNIFICANT CHANGE UP (ref 7–14)
AST SERPL-CCNC: 17 U/L — SIGNIFICANT CHANGE UP (ref 0–41)
BILIRUB SERPL-MCNC: 0.6 MG/DL — SIGNIFICANT CHANGE UP (ref 0.2–1.2)
BUN SERPL-MCNC: 24 MG/DL — HIGH (ref 10–20)
CALCIUM SERPL-MCNC: 9 MG/DL — SIGNIFICANT CHANGE UP (ref 8.5–10.1)
CHLORIDE SERPL-SCNC: 98 MMOL/L — SIGNIFICANT CHANGE UP (ref 98–110)
CO2 SERPL-SCNC: 34 MMOL/L — HIGH (ref 17–32)
CREAT SERPL-MCNC: 0.8 MG/DL — SIGNIFICANT CHANGE UP (ref 0.7–1.5)
GLUCOSE BLDC GLUCOMTR-MCNC: 137 MG/DL — HIGH (ref 70–99)
GLUCOSE BLDC GLUCOMTR-MCNC: 138 MG/DL — HIGH (ref 70–99)
GLUCOSE BLDC GLUCOMTR-MCNC: 155 MG/DL — HIGH (ref 70–99)
GLUCOSE BLDC GLUCOMTR-MCNC: 162 MG/DL — HIGH (ref 70–99)
GLUCOSE SERPL-MCNC: 152 MG/DL — HIGH (ref 70–99)
HCT VFR BLD CALC: 41.1 % — SIGNIFICANT CHANGE UP (ref 37–47)
HGB BLD-MCNC: 12.6 G/DL — SIGNIFICANT CHANGE UP (ref 12–16)
MAGNESIUM SERPL-MCNC: 1.7 MG/DL — LOW (ref 1.8–2.4)
MCHC RBC-ENTMCNC: 27.6 PG — SIGNIFICANT CHANGE UP (ref 27–31)
MCHC RBC-ENTMCNC: 30.7 G/DL — LOW (ref 32–37)
MCV RBC AUTO: 89.9 FL — SIGNIFICANT CHANGE UP (ref 81–99)
NRBC # BLD: 0 /100 WBCS — SIGNIFICANT CHANGE UP (ref 0–0)
PLATELET # BLD AUTO: 274 K/UL — SIGNIFICANT CHANGE UP (ref 130–400)
POTASSIUM SERPL-MCNC: 3.6 MMOL/L — SIGNIFICANT CHANGE UP (ref 3.5–5)
POTASSIUM SERPL-SCNC: 3.6 MMOL/L — SIGNIFICANT CHANGE UP (ref 3.5–5)
PROT SERPL-MCNC: 6.5 G/DL — SIGNIFICANT CHANGE UP (ref 6–8)
RBC # BLD: 4.57 M/UL — SIGNIFICANT CHANGE UP (ref 4.2–5.4)
RBC # FLD: 17.9 % — HIGH (ref 11.5–14.5)
SODIUM SERPL-SCNC: 142 MMOL/L — SIGNIFICANT CHANGE UP (ref 135–146)
WBC # BLD: 4.97 K/UL — SIGNIFICANT CHANGE UP (ref 4.8–10.8)
WBC # FLD AUTO: 4.97 K/UL — SIGNIFICANT CHANGE UP (ref 4.8–10.8)

## 2021-06-13 PROCEDURE — 99233 SBSQ HOSP IP/OBS HIGH 50: CPT

## 2021-06-13 RX ADMIN — Medication 10 MILLIGRAM(S): at 13:19

## 2021-06-13 RX ADMIN — Medication 81 MILLIGRAM(S): at 13:21

## 2021-06-13 RX ADMIN — Medication 40 MILLIGRAM(S): at 05:05

## 2021-06-13 RX ADMIN — Medication 4 UNIT(S): at 17:17

## 2021-06-13 RX ADMIN — CLOPIDOGREL BISULFATE 75 MILLIGRAM(S): 75 TABLET, FILM COATED ORAL at 13:20

## 2021-06-13 RX ADMIN — ENOXAPARIN SODIUM 40 MILLIGRAM(S): 100 INJECTION SUBCUTANEOUS at 13:17

## 2021-06-13 RX ADMIN — SENNA PLUS 2 TABLET(S): 8.6 TABLET ORAL at 21:44

## 2021-06-13 RX ADMIN — RISPERIDONE 1 MILLIGRAM(S): 4 TABLET ORAL at 13:17

## 2021-06-13 RX ADMIN — BUDESONIDE AND FORMOTEROL FUMARATE DIHYDRATE 2 PUFF(S): 160; 4.5 AEROSOL RESPIRATORY (INHALATION) at 08:25

## 2021-06-13 RX ADMIN — Medication 10 MILLIGRAM(S): at 21:45

## 2021-06-13 RX ADMIN — MAGNESIUM OXIDE 400 MG ORAL TABLET 400 MILLIGRAM(S): 241.3 TABLET ORAL at 13:19

## 2021-06-13 RX ADMIN — Medication 4 UNIT(S): at 08:24

## 2021-06-13 RX ADMIN — Medication 4 UNIT(S): at 12:41

## 2021-06-13 RX ADMIN — LOSARTAN POTASSIUM 100 MILLIGRAM(S): 100 TABLET, FILM COATED ORAL at 05:05

## 2021-06-13 RX ADMIN — BUDESONIDE AND FORMOTEROL FUMARATE DIHYDRATE 2 PUFF(S): 160; 4.5 AEROSOL RESPIRATORY (INHALATION) at 21:43

## 2021-06-13 RX ADMIN — QUETIAPINE FUMARATE 100 MILLIGRAM(S): 200 TABLET, FILM COATED ORAL at 21:44

## 2021-06-13 RX ADMIN — MAGNESIUM OXIDE 400 MG ORAL TABLET 400 MILLIGRAM(S): 241.3 TABLET ORAL at 17:19

## 2021-06-13 RX ADMIN — ATORVASTATIN CALCIUM 80 MILLIGRAM(S): 80 TABLET, FILM COATED ORAL at 21:46

## 2021-06-13 RX ADMIN — Medication 1: at 12:41

## 2021-06-13 RX ADMIN — MAGNESIUM OXIDE 400 MG ORAL TABLET 400 MILLIGRAM(S): 241.3 TABLET ORAL at 08:24

## 2021-06-13 RX ADMIN — CHLORHEXIDINE GLUCONATE 1 APPLICATION(S): 213 SOLUTION TOPICAL at 05:06

## 2021-06-13 RX ADMIN — INSULIN GLARGINE 12 UNIT(S): 100 INJECTION, SOLUTION SUBCUTANEOUS at 21:43

## 2021-06-13 RX ADMIN — Medication 200 MILLIGRAM(S): at 05:05

## 2021-06-13 RX ADMIN — Medication 40 MILLIGRAM(S): at 17:18

## 2021-06-13 RX ADMIN — RISPERIDONE 3 MILLIGRAM(S): 4 TABLET ORAL at 21:44

## 2021-06-13 RX ADMIN — Medication 10 MILLIGRAM(S): at 05:05

## 2021-06-13 NOTE — PROGRESS NOTE ADULT - SUBJECTIVE AND OBJECTIVE BOX
WILLIAN MARY  56y  Female      Patient is a 56y old  Female who presents with a chief complaint of CHF exacerbation (13 Jun 2021 07:18)      INTERVAL HPI/OVERNIGHT EVENTS:  She feels ok, legs edema improved.   Vital Signs Last 24 Hrs  T(C): 35.7 (13 Jun 2021 05:19), Max: 36.4 (12 Jun 2021 20:15)  T(F): 96.2 (13 Jun 2021 05:19), Max: 97.6 (12 Jun 2021 20:15)  HR: 69 (13 Jun 2021 05:19) (69 - 113)  BP: 153/86 (13 Jun 2021 05:19) (117/72 - 153/86)  BP(mean): --  RR: 18 (13 Jun 2021 05:19) (18 - 18)  SpO2: --      06-12-21 @ 07:01  -  06-13-21 @ 07:00  --------------------------------------------------------  IN: 800 mL / OUT: 3825 mL / NET: -3025 mL            Consultant(s) Notes Reviewed:  [x ] YES  [ ] NO          MEDICATIONS  (STANDING):  albuterol/ipratropium for Nebulization. 3 milliLiter(s) Nebulizer once  aspirin  chewable 81 milliGRAM(s) Oral daily  atorvastatin 80 milliGRAM(s) Oral at bedtime  budesonide 160 MICROgram(s)/formoterol 4.5 MICROgram(s) Inhaler 2 Puff(s) Inhalation two times a day  busPIRone 10 milliGRAM(s) Oral three times a day  chlorhexidine 4% Liquid 1 Application(s) Topical <User Schedule>  clopidogrel Tablet 75 milliGRAM(s) Oral daily  dextrose 40% Gel 15 Gram(s) Oral once  dextrose 5%. 1000 milliLiter(s) (50 mL/Hr) IV Continuous <Continuous>  dextrose 5%. 1000 milliLiter(s) (100 mL/Hr) IV Continuous <Continuous>  dextrose 50% Injectable 25 Gram(s) IV Push once  dextrose 50% Injectable 12.5 Gram(s) IV Push once  dextrose 50% Injectable 25 Gram(s) IV Push once  enoxaparin Injectable 40 milliGRAM(s) SubCutaneous daily  furosemide   Injectable 40 milliGRAM(s) IV Push every 12 hours  glucagon  Injectable 1 milliGRAM(s) IntraMuscular once  insulin glargine Injectable (LANTUS) 12 Unit(s) SubCutaneous at bedtime  insulin lispro (ADMELOG) corrective regimen sliding scale   SubCutaneous three times a day before meals  insulin lispro Injectable (ADMELOG) 4 Unit(s) SubCutaneous three times a day before meals  losartan 100 milliGRAM(s) Oral daily  magnesium oxide 400 milliGRAM(s) Oral three times a day with meals  metoprolol succinate  milliGRAM(s) Oral daily  polyethylene glycol 3350 17 Gram(s) Oral two times a day  QUEtiapine 100 milliGRAM(s) Oral at bedtime  risperiDONE   Tablet 1 milliGRAM(s) Oral daily  risperiDONE   Tablet 3 milliGRAM(s) Oral at bedtime  senna 2 Tablet(s) Oral at bedtime    MEDICATIONS  (PRN):  bisacodyl Suppository 10 milliGRAM(s) Rectal daily PRN Constipation      LABS                          12.6   4.97  )-----------( 274      ( 13 Jun 2021 08:23 )             41.1     06-13    142  |  98  |  24<H>  ----------------------------<  152<H>  3.6   |  34<H>  |  0.8    Ca    9.0      13 Jun 2021 08:23  Mg     1.7     06-13    TPro  6.5  /  Alb  3.5  /  TBili  0.6  /  DBili  x   /  AST  17  /  ALT  9   /  AlkPhos  111  06-13          Lactate Trend        CAPILLARY BLOOD GLUCOSE      POCT Blood Glucose.: 162 mg/dL (13 Jun 2021 11:42)        RADIOLOGY & ADDITIONAL TESTS:    Imaging Personally Reviewed:  [ ] YES  [ ] NO    HEALTH ISSUES - PROBLEM Dx:          PHYSICAL EXAM:  GENERAL: NAD, well-developed.  HEAD:  Atraumatic, Normocephalic.  EYES: EOMI, PERRLA, conjunctiva and sclera clear.  NECK: Supple, JVD  CHEST/LUNG: Bibasilar rales, scattered expiratory wheezing.    HEART: Regular rate and rhythm; S1 S2.   ABDOMEN: Soft, Nontender, Nondistended; Bowel sounds present.  EXTREMITIES: LE edema 2+,  PSYCH: AAOx3.  NEUROLOGY: non-focal.  SKIN: No rashes or lesions.

## 2021-06-13 NOTE — PROGRESS NOTE ADULT - ASSESSMENT
56 year old female with PMH of HTN, DM 2, HFrEF, COPD on 4L O2, CVA with residual LE weakness and depression came to ED for worsening SOB and LE edema for the last two weeks. She was not taking her Lasix.   In the ED, Chest X-ray shows pulm vascular congestion and cardiomegaly. BNP elevated. Started on IV Lasix. Pt hypoxic to 94% on NC    A/P:   Acute on chronic hypoxic respiratory failure  Acute HFrEF:   Patient with LE edema, SOB , Pro-BNO  CXR showed vascular congestion.   Echo LVEF 38%, grade II diastolic dysfunction, mild to mod MR, mild to mod TR, mild AR, mod Pulm HTN.   Continue Lasix 40mg IV BID, Metoprolol XL 200mg mg Daily and, switch Lisinopril to Losartan 100mg daily.   Cardiology recommended ischemic work up, patient reports she has cardiac cath 6 months ago at [facility], no intervention done.   Daily weight, fluid restriction 1.5L /day, low sodium diet.     Chronic Respiratory failure:   COPD  Chronic tobacco abuse:   Still with expiratory wheezing on exam.   Continue Symbicort, continue DuoNeb prn.     History of CVA with residual LE weakness  Continue ASA, Lipitor   Magnesium deficiency.    HTN: Continue lisinopril, Lasix and Metoprolol.    DM II: FS is controlled, A1C 6.8, continue Lantus 12 units and Pre-meals Lispro.     Depression: Continue Wellbutrin and Risperidone.    #Progress Note Handoff:  Pending (specify):  improving SOB and volume overload. Hear failure follow up.   Family discussion:  need to continue Iv diuresis, daily weight. Counseled for smoking cessation.   Disposition: Home possible in 24 hrs with home care.

## 2021-06-13 NOTE — PROGRESS NOTE ADULT - SUBJECTIVE AND OBJECTIVE BOX
WILLIAN MARY 56y Female  MRN#: [id number]   CODE STATUS:________      SUBJECTIVE  Patient is a 56y old Female who presents with a chief complaint of CHF exacerbation (12 Jun 2021 15:14)  Currently admitted to medicine with the primary diagnosis of CHF exacerbation    Patient examined at bedside. No events overnight.      OBJECTIVE  PAST MEDICAL & SURGICAL HISTORY  Hypertension    Hyperlipidemia    Anxiety and depression    COPD, severe    CHF (congestive heart failure)    Cerebrovascular accident (CVA)  Multiple    Type 2 diabetes mellitus    CKD (chronic kidney disease), stage II    No significant past surgical history      ALLERGIES:  No Known Allergies    MEDICATIONS:  STANDING MEDICATIONS  albuterol/ipratropium for Nebulization. 3 milliLiter(s) Nebulizer once  aspirin  chewable 81 milliGRAM(s) Oral daily  atorvastatin 80 milliGRAM(s) Oral at bedtime  budesonide 160 MICROgram(s)/formoterol 4.5 MICROgram(s) Inhaler 2 Puff(s) Inhalation two times a day  busPIRone 10 milliGRAM(s) Oral three times a day  chlorhexidine 4% Liquid 1 Application(s) Topical <User Schedule>  clopidogrel Tablet 75 milliGRAM(s) Oral daily  dextrose 40% Gel 15 Gram(s) Oral once  dextrose 5%. 1000 milliLiter(s) IV Continuous <Continuous>  dextrose 5%. 1000 milliLiter(s) IV Continuous <Continuous>  dextrose 50% Injectable 12.5 Gram(s) IV Push once  dextrose 50% Injectable 25 Gram(s) IV Push once  dextrose 50% Injectable 25 Gram(s) IV Push once  enoxaparin Injectable 40 milliGRAM(s) SubCutaneous daily  furosemide   Injectable 40 milliGRAM(s) IV Push every 12 hours  glucagon  Injectable 1 milliGRAM(s) IntraMuscular once  insulin glargine Injectable (LANTUS) 12 Unit(s) SubCutaneous at bedtime  insulin lispro (ADMELOG) corrective regimen sliding scale   SubCutaneous three times a day before meals  insulin lispro Injectable (ADMELOG) 4 Unit(s) SubCutaneous three times a day before meals  losartan 100 milliGRAM(s) Oral daily  magnesium oxide 400 milliGRAM(s) Oral three times a day with meals  metoprolol succinate  milliGRAM(s) Oral daily  polyethylene glycol 3350 17 Gram(s) Oral two times a day  QUEtiapine 100 milliGRAM(s) Oral at bedtime  risperiDONE   Tablet 1 milliGRAM(s) Oral daily  risperiDONE   Tablet 3 milliGRAM(s) Oral at bedtime  senna 2 Tablet(s) Oral at bedtime    PRN MEDICATIONS  bisacodyl Suppository 10 milliGRAM(s) Rectal daily PRN      VITAL SIGNS: Last 24 Hours  T(C): 35.7 (13 Jun 2021 05:19), Max: 36.4 (12 Jun 2021 20:15)  T(F): 96.2 (13 Jun 2021 05:19), Max: 97.6 (12 Jun 2021 20:15)  HR: 69 (13 Jun 2021 05:19) (69 - 113)  BP: 153/86 (13 Jun 2021 05:19) (117/72 - 153/86)  BP(mean): --  RR: 18 (13 Jun 2021 05:19) (18 - 18)  SpO2: --    LABS:                        11.7   4.83  )-----------( 246      ( 12 Jun 2021 06:14 )             38.7     06-12    141  |  101  |  21<H>  ----------------------------<  107<H>  3.8   |  34<H>  |  0.9    Ca    8.9      12 Jun 2021 06:14  Mg     1.7     06-12    TPro  5.8<L>  /  Alb  3.1<L>  /  TBili  0.5  /  DBili  x   /  AST  14  /  ALT  7   /  AlkPhos  91  06-12      PHYSICAL EXAM:      GENERAL: NAD, well-developed, AAOx3  HEENT:  Atraumatic, Normocephalic. EOMI, PERRLA, conjunctiva and sclera clear, No JVD  PULMONARY: Expiratory wheeze B/L lung field  CARDIOVASCULAR: Regular rate and rhythm; No murmurs, rubs, or gallops  GASTROINTESTINAL: Soft, Nontender, Nondistended; Bowel sounds present  MUSCULOSKELETAL:  Mild swelling lower extremities, non-pitting  NEUROLOGY: non-focal  SKIN: No rashes or lesions      ASSESSMENT & PLAN      Patient is a 56 year old woman with PMH chronic hypoxic respiratory failure (on 4L home O2) secondary to  HFrEF 38%, HTN, HLD, DM2, CORNELIUS on CPAP, CVA with residual LE weakness (2014, wheelchair bound),  COPD, depression who presents of worsening shortness of breath and increase weight with  B/L LE x 2weeks. Admitted for acute on chronic CHF exacerbation.    1) Acute on chronic CHF exacerbation with resolved acute on chronic hypoxic respiratory failure  - Currently on 4L NC (on 4L at home)  - BNP 6/6: 7726  - CXR 6/6: cardiomegaly and bilateral interstitial opacities  - Continue IV lasix 40mg q12h  - Daily weights/ strict I+O/ fluid restrict  - Continue losartan 100mg q24hrs, metoprolol succinate 200mg q24hrs  - Heart failure consult appreciated   - Echo 2/2020: EF 38%, grade II diastolic dysfunction    2) DM II  - Lantus 12 qhs, lispro 4/4/4  - 6/7/21 A1c 6.5    3) HLD  - Continue atorvastatin 80mg qhs  - Lipid panel: LDL 42, HDL 29, triglycerides 183     4) COPD/pulmonary HTN  - Continue inhalers    5) CORNELIUS/OHS ? on CPAP  - CPAP at night  - Outpatient pulm follow-up    6) CVA with residual LE weakness  - c/w ASA/plavix/statin    7) Depression/ mood disorder  - Continue risperidone 3mg qhs, quetiapine 100mg qhs, buspirone 10mg TID    MISC:  - DVT ppx: lovenox  - PPI ppx: NA  - DIET: DASH carb consistent, fluid restriction  - Code Status: Full Code

## 2021-06-14 ENCOUNTER — TRANSCRIPTION ENCOUNTER (OUTPATIENT)
Age: 56
End: 2021-06-14

## 2021-06-14 LAB
ALBUMIN SERPL ELPH-MCNC: 3.5 G/DL — SIGNIFICANT CHANGE UP (ref 3.5–5.2)
ALP SERPL-CCNC: 98 U/L — SIGNIFICANT CHANGE UP (ref 30–115)
ALT FLD-CCNC: 11 U/L — SIGNIFICANT CHANGE UP (ref 0–41)
ANION GAP SERPL CALC-SCNC: 9 MMOL/L — SIGNIFICANT CHANGE UP (ref 7–14)
AST SERPL-CCNC: 21 U/L — SIGNIFICANT CHANGE UP (ref 0–41)
BASOPHILS # BLD AUTO: 0.06 K/UL — SIGNIFICANT CHANGE UP (ref 0–0.2)
BASOPHILS NFR BLD AUTO: 1.2 % — HIGH (ref 0–1)
BILIRUB SERPL-MCNC: 0.6 MG/DL — SIGNIFICANT CHANGE UP (ref 0.2–1.2)
BUN SERPL-MCNC: 22 MG/DL — HIGH (ref 10–20)
CALCIUM SERPL-MCNC: 9 MG/DL — SIGNIFICANT CHANGE UP (ref 8.5–10.1)
CHLORIDE SERPL-SCNC: 100 MMOL/L — SIGNIFICANT CHANGE UP (ref 98–110)
CO2 SERPL-SCNC: 34 MMOL/L — HIGH (ref 17–32)
CREAT SERPL-MCNC: 0.8 MG/DL — SIGNIFICANT CHANGE UP (ref 0.7–1.5)
EOSINOPHIL # BLD AUTO: 0.1 K/UL — SIGNIFICANT CHANGE UP (ref 0–0.7)
EOSINOPHIL NFR BLD AUTO: 2 % — SIGNIFICANT CHANGE UP (ref 0–8)
GLUCOSE BLDC GLUCOMTR-MCNC: 124 MG/DL — HIGH (ref 70–99)
GLUCOSE BLDC GLUCOMTR-MCNC: 131 MG/DL — HIGH (ref 70–99)
GLUCOSE BLDC GLUCOMTR-MCNC: 165 MG/DL — HIGH (ref 70–99)
GLUCOSE BLDC GLUCOMTR-MCNC: 173 MG/DL — HIGH (ref 70–99)
GLUCOSE BLDC GLUCOMTR-MCNC: 188 MG/DL — HIGH (ref 70–99)
GLUCOSE SERPL-MCNC: 118 MG/DL — HIGH (ref 70–99)
HCT VFR BLD CALC: 41 % — SIGNIFICANT CHANGE UP (ref 37–47)
HGB BLD-MCNC: 12.4 G/DL — SIGNIFICANT CHANGE UP (ref 12–16)
IMM GRANULOCYTES NFR BLD AUTO: 0.8 % — HIGH (ref 0.1–0.3)
LYMPHOCYTES # BLD AUTO: 1.33 K/UL — SIGNIFICANT CHANGE UP (ref 1.2–3.4)
LYMPHOCYTES # BLD AUTO: 26.4 % — SIGNIFICANT CHANGE UP (ref 20.5–51.1)
MAGNESIUM SERPL-MCNC: 1.7 MG/DL — LOW (ref 1.8–2.4)
MCHC RBC-ENTMCNC: 27.4 PG — SIGNIFICANT CHANGE UP (ref 27–31)
MCHC RBC-ENTMCNC: 30.2 G/DL — LOW (ref 32–37)
MCV RBC AUTO: 90.5 FL — SIGNIFICANT CHANGE UP (ref 81–99)
MONOCYTES # BLD AUTO: 0.36 K/UL — SIGNIFICANT CHANGE UP (ref 0.1–0.6)
MONOCYTES NFR BLD AUTO: 7.2 % — SIGNIFICANT CHANGE UP (ref 1.7–9.3)
NEUTROPHILS # BLD AUTO: 3.14 K/UL — SIGNIFICANT CHANGE UP (ref 1.4–6.5)
NEUTROPHILS NFR BLD AUTO: 62.4 % — SIGNIFICANT CHANGE UP (ref 42.2–75.2)
NRBC # BLD: 0 /100 WBCS — SIGNIFICANT CHANGE UP (ref 0–0)
PLATELET # BLD AUTO: 286 K/UL — SIGNIFICANT CHANGE UP (ref 130–400)
POTASSIUM SERPL-MCNC: 3.9 MMOL/L — SIGNIFICANT CHANGE UP (ref 3.5–5)
POTASSIUM SERPL-SCNC: 3.9 MMOL/L — SIGNIFICANT CHANGE UP (ref 3.5–5)
PROT SERPL-MCNC: 6.5 G/DL — SIGNIFICANT CHANGE UP (ref 6–8)
RBC # BLD: 4.53 M/UL — SIGNIFICANT CHANGE UP (ref 4.2–5.4)
RBC # FLD: 17.6 % — HIGH (ref 11.5–14.5)
SODIUM SERPL-SCNC: 143 MMOL/L — SIGNIFICANT CHANGE UP (ref 135–146)
WBC # BLD: 5.03 K/UL — SIGNIFICANT CHANGE UP (ref 4.8–10.8)
WBC # FLD AUTO: 5.03 K/UL — SIGNIFICANT CHANGE UP (ref 4.8–10.8)

## 2021-06-14 PROCEDURE — 93306 TTE W/DOPPLER COMPLETE: CPT | Mod: 26

## 2021-06-14 PROCEDURE — 99223 1ST HOSP IP/OBS HIGH 75: CPT

## 2021-06-14 PROCEDURE — 71045 X-RAY EXAM CHEST 1 VIEW: CPT | Mod: 26

## 2021-06-14 PROCEDURE — 93010 ELECTROCARDIOGRAM REPORT: CPT

## 2021-06-14 PROCEDURE — 99233 SBSQ HOSP IP/OBS HIGH 50: CPT

## 2021-06-14 RX ORDER — LISINOPRIL 2.5 MG/1
1 TABLET ORAL
Qty: 0 | Refills: 0 | DISCHARGE

## 2021-06-14 RX ORDER — ATORVASTATIN CALCIUM 80 MG/1
1 TABLET, FILM COATED ORAL
Qty: 0 | Refills: 0 | DISCHARGE

## 2021-06-14 RX ORDER — ADENOSINE 3 MG/ML
6 INJECTION INTRAVENOUS ONCE
Refills: 0 | Status: COMPLETED | OUTPATIENT
Start: 2021-06-14 | End: 2021-06-14

## 2021-06-14 RX ORDER — DILTIAZEM HCL 120 MG
7.5 CAPSULE, EXT RELEASE 24 HR ORAL
Qty: 125 | Refills: 0 | Status: DISCONTINUED | OUTPATIENT
Start: 2021-06-14 | End: 2021-06-15

## 2021-06-14 RX ORDER — MAGNESIUM SULFATE 500 MG/ML
2 VIAL (ML) INJECTION ONCE
Refills: 0 | Status: COMPLETED | OUTPATIENT
Start: 2021-06-14 | End: 2021-06-14

## 2021-06-14 RX ORDER — LOSARTAN POTASSIUM 100 MG/1
1 TABLET, FILM COATED ORAL
Qty: 30 | Refills: 0
Start: 2021-06-14 | End: 2021-07-13

## 2021-06-14 RX ORDER — ATORVASTATIN CALCIUM 80 MG/1
1 TABLET, FILM COATED ORAL
Qty: 30 | Refills: 0
Start: 2021-06-14 | End: 2021-07-13

## 2021-06-14 RX ADMIN — LOSARTAN POTASSIUM 100 MILLIGRAM(S): 100 TABLET, FILM COATED ORAL at 05:08

## 2021-06-14 RX ADMIN — ADENOSINE 6 MILLIGRAM(S): 3 INJECTION INTRAVENOUS at 18:32

## 2021-06-14 RX ADMIN — MAGNESIUM OXIDE 400 MG ORAL TABLET 400 MILLIGRAM(S): 241.3 TABLET ORAL at 08:08

## 2021-06-14 RX ADMIN — Medication 4 UNIT(S): at 12:42

## 2021-06-14 RX ADMIN — RISPERIDONE 1 MILLIGRAM(S): 4 TABLET ORAL at 11:43

## 2021-06-14 RX ADMIN — Medication 5 MG/HR: at 16:37

## 2021-06-14 RX ADMIN — RISPERIDONE 3 MILLIGRAM(S): 4 TABLET ORAL at 22:27

## 2021-06-14 RX ADMIN — Medication 25 GRAM(S): at 09:45

## 2021-06-14 RX ADMIN — Medication 20 MILLIGRAM(S): at 17:45

## 2021-06-14 RX ADMIN — BUDESONIDE AND FORMOTEROL FUMARATE DIHYDRATE 2 PUFF(S): 160; 4.5 AEROSOL RESPIRATORY (INHALATION) at 22:27

## 2021-06-14 RX ADMIN — BUDESONIDE AND FORMOTEROL FUMARATE DIHYDRATE 2 PUFF(S): 160; 4.5 AEROSOL RESPIRATORY (INHALATION) at 08:08

## 2021-06-14 RX ADMIN — Medication 4 UNIT(S): at 08:08

## 2021-06-14 RX ADMIN — QUETIAPINE FUMARATE 100 MILLIGRAM(S): 200 TABLET, FILM COATED ORAL at 22:30

## 2021-06-14 RX ADMIN — ATORVASTATIN CALCIUM 80 MILLIGRAM(S): 80 TABLET, FILM COATED ORAL at 22:27

## 2021-06-14 RX ADMIN — Medication 10 MILLIGRAM(S): at 16:36

## 2021-06-14 RX ADMIN — Medication 10 MILLIGRAM(S): at 22:29

## 2021-06-14 RX ADMIN — Medication 1: at 12:42

## 2021-06-14 RX ADMIN — CHLORHEXIDINE GLUCONATE 1 APPLICATION(S): 213 SOLUTION TOPICAL at 05:09

## 2021-06-14 RX ADMIN — Medication 200 MILLIGRAM(S): at 05:08

## 2021-06-14 RX ADMIN — Medication 4 UNIT(S): at 17:35

## 2021-06-14 RX ADMIN — MAGNESIUM OXIDE 400 MG ORAL TABLET 400 MILLIGRAM(S): 241.3 TABLET ORAL at 11:44

## 2021-06-14 RX ADMIN — Medication 81 MILLIGRAM(S): at 11:44

## 2021-06-14 RX ADMIN — INSULIN GLARGINE 12 UNIT(S): 100 INJECTION, SOLUTION SUBCUTANEOUS at 22:28

## 2021-06-14 RX ADMIN — MAGNESIUM OXIDE 400 MG ORAL TABLET 400 MILLIGRAM(S): 241.3 TABLET ORAL at 17:34

## 2021-06-14 RX ADMIN — Medication 40 MILLIGRAM(S): at 05:09

## 2021-06-14 RX ADMIN — ENOXAPARIN SODIUM 40 MILLIGRAM(S): 100 INJECTION SUBCUTANEOUS at 11:44

## 2021-06-14 RX ADMIN — CLOPIDOGREL BISULFATE 75 MILLIGRAM(S): 75 TABLET, FILM COATED ORAL at 11:44

## 2021-06-14 RX ADMIN — Medication 10 MILLIGRAM(S): at 05:07

## 2021-06-14 NOTE — DISCHARGE NOTE PROVIDER - PROVIDER TOKENS
PROVIDER:[TOKEN:[[id number]],FOLLOWUP:[2 weeks]] PROVIDER:[TOKEN:[[id number]],FOLLOWUP:[2 weeks]],PROVIDER:[TOKEN:[[id number]]],PROVIDER:[TOKEN:[[id number]]]

## 2021-06-14 NOTE — DISCHARGE NOTE PROVIDER - NSDCCPCAREPLAN_GEN_ALL_CORE_FT
PRINCIPAL DISCHARGE DIAGNOSIS  Diagnosis: CHF exacerbation  Assessment and Plan of Treatment: You had shortness of breath when you presented; we believe this is due to exacerbation of your CHF. We treated you with furosemide (Lasix) which helped remove some of the excess water within your body. Please continue to take your medication as prescribed. Please limit your fluid intake to 1.2L daily and maintain a low-salt diet. Follow with your primary care provider for further management. Please return to the ED if you have persistent shortness of breath or leg swelling.      SECONDARY DISCHARGE DIAGNOSES  Diagnosis: Hypomagnesemia  Assessment and Plan of Treatment:      PRINCIPAL DISCHARGE DIAGNOSIS  Diagnosis: CHF exacerbation  Assessment and Plan of Treatment: You had shortness of breath when you presented; we believe this is due to exacerbation of your CHF. We treated you with furosemide (Lasix) which helped remove some of the excess water within your body. Please continue to take your medication as prescribed. Please limit your fluid intake to 1.2L daily and maintain a low-salt diet. Follow with your primary care provider for further management. Please return to the ED if you have persistent shortness of breath or leg swelling.  Additionally you were found to have blockage in your coronary artery. You underwent cardiac cathterization. Please take medications as precribed. Please stop smoking as this puts you at risk to have another blockage.       PRINCIPAL DISCHARGE DIAGNOSIS  Diagnosis: CHF exacerbation  Assessment and Plan of Treatment: You had shortness of breath when you presented; we believe this is due to exacerbation of your CHF. We treated you with furosemide (Lasix) which helped remove some of the excess water within your body. Please continue to take your medication as prescribed. Please limit your fluid intake to 1.2L daily and maintain a low-salt diet. Follow with your primary care provider for further management. Please return to the ED if you have persistent shortness of breath or leg swelling.  Additionally you were found to have blockage in your coronary artery. You underwent cardiac cathterization. Please take medications as precribed. Please stop smoking as this puts you at risk to have another blockage.      SECONDARY DISCHARGE DIAGNOSES  Diagnosis: CAD (coronary artery disease)  Assessment and Plan of Treatment:      PRINCIPAL DISCHARGE DIAGNOSIS  Diagnosis: CHF exacerbation  Assessment and Plan of Treatment: You had shortness of breath when you presented; we believe this is due to exacerbation of your CHF. We treated you with furosemide (Lasix) which helped remove some of the excess water within your body. Please continue to take your medication as prescribed. Please limit your fluid intake to 1.2L daily and maintain a low-salt diet. Follow with your primary care provider for further management. Please return to the ED if you have persistent shortness of breath or leg swelling.  Additionally you were found to have blockage in your coronary artery. You underwent cardiac cathterization. Please take medications as precribed. Please stop smoking as this puts you at risk to have another blockage.  You are to take torsemide twice per day until 7/2 then take it once per day        SECONDARY DISCHARGE DIAGNOSES  Diagnosis: Bipolar disorder  Assessment and Plan of Treatment: Please follow up with Dr. Sierra from [facility], [phone], next scheduled appointment on June 30, 2021.     PRINCIPAL DISCHARGE DIAGNOSIS  Diagnosis: CHF exacerbation  Assessment and Plan of Treatment: You had shortness of breath when you presented; we believe this is due to exacerbation of your CHF. We treated you with furosemide (Lasix) which helped remove some of the excess water within your body. Please continue to take your medication as prescribed. Please limit your fluid intake to 1.2L daily and maintain a low-salt diet. Follow with your primary care provider for further management. Please return to the ED if you have persistent shortness of breath or leg swelling.  Additionally you were found to have blockage in your coronary artery. You underwent cardiac cathterization. Please take medications as precribed. Please stop smoking as this puts you at risk to have another blockage.  You are to take torsemide twice per day until 7/2 then take it once per day.  Please avoid heavy lifting or reaching overhead for week due to cardiac catherization puncture site. Avoid saturating wrist in water for 1 week.        SECONDARY DISCHARGE DIAGNOSES  Diagnosis: Bipolar disorder  Assessment and Plan of Treatment: Please follow up with Dr. Sierra from [facility], [phone], next scheduled appointment on June 30, 2021.  You only have a 10 day supply of ativan. YOu must follow up with Dr. Sierra. Withdrawal from ativan can be deadly.

## 2021-06-14 NOTE — PROGRESS NOTE ADULT - ASSESSMENT
56 year old female with PMH of HTN, DM 2, HFrEF, COPD on 4L O2, CVA with residual LE weakness and depression came to ED for worsening SOB and LE edema for the last two weeks. She was not taking her Lasix.   In the ED, Chest X-ray shows pulm vascular congestion and cardiomegaly. BNP elevated. Started on IV Lasix. Pt hypoxic to 94% on NC    A/P:   Acute on chronic hypoxic respiratory failure  Acute HFrEF:   Patient with LE edema, SOB , Pro-BNO  CXR showed vascular congestion.   Echo LVEF 38%, grade II diastolic dysfunction, mild to mod MR, mild to mod TR, mild AR, mod Pulm HTN.   Continue Lasix 40mg IV BID, Metoprolol XL 200mg mg Daily and, switch Lisinopril to Losartan 100mg daily.   Cardiology recommended ischemic work up, patient reports she has cardiac cath 6 months ago at [facility], no intervention done.   Daily weight, fluid restriction 1.5L /day, low sodium diet.     Atrial Tachycardia.   HR is not controlled.   Possible start on Amiodarone. EP follow up.     Chronic Respiratory failure:   COPD  Chronic tobacco abuse:   Continue Symbicort, continue DuoNeb prn.     History of CVA with residual LE weakness  Continue ASA, Lipitor   Magnesium deficiency.    HTN: Continue lisinopril, Lasix and Metoprolol.    DM II: FS is controlled, A1C 6.8, continue Lantus 12 units and Pre-meals Lispro.     Depression: Continue Wellbutrin and Risperidone.    #Progress Note Handoff:  Pending (specify):  improving SOB and volume overload. Hear failure follow up.   Family discussion:  need to continue Iv diuresis, daily weight. Counseled for smoking cessation.   Disposition: Home possible in 24 hrs with home care.

## 2021-06-14 NOTE — CONSULT NOTE ADULT - SUBJECTIVE AND OBJECTIVE BOX
Patient is a 56y old  Female who presents with a chief complaint of CHF exacerbation (2021 10:20)    HPI: Patient is a 57 y/o woman with PMH chronic hypoxic respiratory failure (on 4L home O2) secondary to HFrEF, HTN, HLD, DM II, CVA (residual LE weakness; wheelchair bound), COPD, mood disorder who presents for dyspnea. Patient had not been taking furosemide over last 2 weeks after running out of medication. BNP elevated to 7000 on admission with bilateral haziness on CXR. Patient admitted for acute on chronic CHF exacerbation. Patient diuresed with IV furosemide with improvement. EP consulted for tachycardia with ?AFL versus atrial tachycardia on telemetry. Patient denies any palpitations, dizziness or CP. She used to see Dr Michaels but has not followed up since he left the practice and reports that she had catheterization ~3 months ago at [facility] which was normal. No known hx of arrhythmias.    PAST MEDICAL & SURGICAL HISTORY:  Hypertension    Hyperlipidemia    Anxiety and depression    COPD, severe    CHF (congestive heart failure)    Cerebrovascular accident (CVA)  Multiple    Type 2 diabetes mellitus    CKD (chronic kidney disease), stage II    No significant past surgical history    PREVIOUS DIAGNOSTIC TESTING:      ECHO  FINDINGS:  < from: Transthoracic Echocardiogram (20 @ 11:05) >  Summary:   1. Moderately decreased segmental left ventricular systolic function.   2. LV Ejection Fraction by Urena's Method with a biplane EF of 38 %.   3. Spectral Doppler shows pseudonormal pattern of left ventricular myocardial filling (Grade IIdiastolic dysfunction).   4. Mild to moderate mitral valve regurgitation.   5. The mitral valve leaflets are tethered which is due to reduced systolic function and elevated LVDP.   6. Mild-moderate tricuspid regurgitation.   7. Mild aortic regurgitation.   8. Sclerotic aortic valve with normal opening.   9. Estimated pulmonary artery systolic pressure is 59.7 mmHg assuming a right atrial pressure of 15 mmHg, which is consistent with moderate pulmonary hypertension.    STRESS  FINDINGS:    CATHETERIZATION  FINDINGS:    ELECTROPHYSIOLOGY STUDY  FINDINGS:    CAROTID ULTRASOUND:  FINDINGS    VENOUS DUPLEX SCAN:  FINDINGS:    CHEST CT PULMONARY ANGIO with IV Contrast:  FINDINGS:    MEDICATIONS  (STANDING):  albuterol/ipratropium for Nebulization. 3 milliLiter(s) Nebulizer once  aspirin  chewable 81 milliGRAM(s) Oral daily  atorvastatin 80 milliGRAM(s) Oral at bedtime  budesonide 160 MICROgram(s)/formoterol 4.5 MICROgram(s) Inhaler 2 Puff(s) Inhalation two times a day  busPIRone 10 milliGRAM(s) Oral three times a day  chlorhexidine 4% Liquid 1 Application(s) Topical <User Schedule>  clopidogrel Tablet 75 milliGRAM(s) Oral daily  dextrose 40% Gel 15 Gram(s) Oral once  dextrose 5%. 1000 milliLiter(s) (50 mL/Hr) IV Continuous <Continuous>  dextrose 5%. 1000 milliLiter(s) (100 mL/Hr) IV Continuous <Continuous>  dextrose 50% Injectable 25 Gram(s) IV Push once  dextrose 50% Injectable 12.5 Gram(s) IV Push once  dextrose 50% Injectable 25 Gram(s) IV Push once  enoxaparin Injectable 40 milliGRAM(s) SubCutaneous daily  furosemide   Injectable 40 milliGRAM(s) IV Push every 12 hours  glucagon  Injectable 1 milliGRAM(s) IntraMuscular once  insulin glargine Injectable (LANTUS) 12 Unit(s) SubCutaneous at bedtime  insulin lispro (ADMELOG) corrective regimen sliding scale   SubCutaneous three times a day before meals  insulin lispro Injectable (ADMELOG) 4 Unit(s) SubCutaneous three times a day before meals  losartan 100 milliGRAM(s) Oral daily  magnesium oxide 400 milliGRAM(s) Oral three times a day with meals  metoprolol succinate  milliGRAM(s) Oral daily  polyethylene glycol 3350 17 Gram(s) Oral two times a day  QUEtiapine 100 milliGRAM(s) Oral at bedtime  risperiDONE   Tablet 1 milliGRAM(s) Oral daily  risperiDONE   Tablet 3 milliGRAM(s) Oral at bedtime  senna 2 Tablet(s) Oral at bedtime    MEDICATIONS  (PRN):  bisacodyl Suppository 10 milliGRAM(s) Rectal daily PRN Constipation      FAMILY HISTORY:  No pertinent family history in first degree relatives    SOCIAL HISTORY: +Smoker, No ETOH or illicit drug use    Past Surgical History: No significant hx    Allergies:  No Known Allergies      REVIEW OF SYSTEMS:  CONSTITUTIONAL: No fever, weight loss, chills, shakes, or fatigue  RESPIRATORY: No cough, wheezing, hemoptysis; +shortness of breath  CARDIOVASCULAR: No chest pain, dyspnea, palpitations, dizziness, syncope, paroxysmal nocturnal dyspnea, orthopnea, or arm or leg swelling  GASTROINTESTINAL: No abdominal  or epigastric pain, nausea, vomiting, hematemesis, diarrhea, constipation, melena or bright red blood.  NEUROLOGICAL: No headaches, memory loss, slurred speech, limb weakness, loss of strength, numbness, or tremors  MUSCULOSKELETAL: No joint pain or swelling, muscle, back, or extremity pain      Vital Signs Last 24 Hrs  T(C): 35.9 (2021 12:23), Max: 36.4 (2021 04:55)  T(F): 96.6 (2021 12:23), Max: 97.6 (2021 04:55)  HR: 114 (2021 12:23) (70 - 114)  BP: 155/91 (2021 12:23) (130/73 - 156/86)  BP(mean): --  RR: 18 (2021 12:23) (18 - 18)  SpO2: 97% (2021 19:37) (97% - 97%)    PHYSICAL EXAM:  GENERAL: In no apparent distress, well nourished, and hydrated.  HEAD:  Atraumatic, Normocephalic  EYES: EOMI, PERRLA, conjunctiva and sclera clear  ENMT: No tonsillar erythema, exudates, or enlargements; ist mucous membranes, Good dentition, No lesions  NECK: Supple and normal thyroid.  No JVD or carotid bruit.  Carotid pulse is 2+ bilaterally.  HEART: Irregular rate and rhythm; No murmurs, rubs, or gallops.  PULMONARY: Clear to auscultation and perfusion.  No rales, wheezing, or rhonchi bilaterally.  ABDOMEN: Soft, Nontender, Nondistended; Bowel sounds present  EXTREMITIES:  2+ Peripheral Pulses, No clubbing, cyanosis, or edema  NEUROLOGICAL: Grossly nonfocal      INTERPRETATION OF TELEMETRY: Atrial tachycardia 112 bpm    ECG:  < from: 12 Lead ECG (21 @ 09:35) >    Ventricular Rate 116 BPM    Atrial Rate 116 BPM    P-R Interval 272 ms    QRS Duration 152 ms    Q-T Interval 396 ms    QTC Calculation(Bazett) 550 ms    P Axis 40 degrees    R Axis 219 degrees    T Axis 59 degrees    Diagnosis Line Sinus tachycardia with 1st degree A-V block  Non-specific intra-ventricular conduction block LBBB type  Possible Lateral infarct , age undetermined  Right superior axis deviation  Abnormal ECG    Confirmed by TITO MONGE MD ([id number]) on 2021 12:21:48 PM    < end of copied text >      I&O's Detail    2021 07:  -  2021 07:00  --------------------------------------------------------  IN:    Oral Fluid: 480 mL  Total IN: 480 mL    OUT:    Voided (mL): 2250 mL  Total OUT: 2250 mL    Total NET: -1770 mL      2021 07:  -  2021 12:50  --------------------------------------------------------  IN:    Oral Fluid: 360 mL  Total IN: 360 mL    OUT:    Voided (mL): 1900 mL  Total OUT: 1900 mL    Total NET: -1540 mL          LABS:                        12.4   5.03  )-----------( 286      ( 2021 04:30 )             41.0     06-14    143  |  100  |  22<H>  ----------------------------<  118<H>  3.9   |  34<H>  |  0.8    Ca    9.0      2021 04:30  Mg     1.7     06-14    TPro  6.5  /  Alb  3.5  /  TBili  0.6  /  DBili  x   /  AST  21  /  ALT  11  /  AlkPhos  98  06-14            BNP  I&O's Detail    2021 07:  -  2021 07:00  --------------------------------------------------------  IN:    Oral Fluid: 480 mL  Total IN: 480 mL    OUT:    Voided (mL): 2250 mL  Total OUT: 2250 mL    Total NET: -1770 mL      2021 07:  -  2021 12:50  --------------------------------------------------------  IN:    Oral Fluid: 360 mL  Total IN: 360 mL    OUT:    Voided (mL): 1900 mL  Total OUT: 1900 mL    Total NET: -1540 mL        Daily     Daily Weight in k.4 (2021 04:55)    RADIOLOGY & ADDITIONAL STUDIES:    < from: Xray Chest 1 View- PORTABLE-Routine (Xray Chest 1 View- PORTABLE-Routine in AM.) (21 @ 05:24) >  EXAM:  XR CHEST PORTABLE ROUTINE 1V            PROCEDURE DATE:  2021            INTERPRETATION:  Clinical History / Reason for exam: Shortness of breath, CHF exacerbation.    Comparison : Chest radiograph dated 2021.    Technique/Positioning: Frontal portable radiograph of the chest. Partially lordotic view.    Findings:    Support devices: Telemetry leads overlie the thorax.    Cardiac/mediastinum/hilum: Unchanged cardiomegaly.    Lung parenchyma/Pleura: Unchanged bilateral opacities. No pneumothorax.    Skeleton/soft tissues: Unchanged.    Impression:    Cardiomegaly with unchanged bilateral opacities.                DUNCAN RODRÍGUEZ M.D., RESIDENT RADIOLOGIST  This document has been electronically signed.  ROSA TSANG M.D., ATTENDING RADIOLOGIST  This document has been electronically signed. 2021 12:15PM    < end of copied text >

## 2021-06-14 NOTE — PROGRESS NOTE ADULT - SUBJECTIVE AND OBJECTIVE BOX
WILLIAN MARY  56y  Female      Patient is a 56y old  Female who presents with a chief complaint of CHF exacerbation (14 Jun 2021 12:49)      INTERVAL HPI/OVERNIGHT EVENTS:  She feels ok and she wants to go home, she was noted with episode of tachycardia 160 on telemetry.   Vital Signs Last 24 Hrs  T(C): 35.9 (14 Jun 2021 12:23), Max: 36.4 (14 Jun 2021 04:55)  T(F): 96.6 (14 Jun 2021 12:23), Max: 97.6 (14 Jun 2021 04:55)  HR: 114 (14 Jun 2021 12:23) (70 - 114)  BP: 155/91 (14 Jun 2021 12:23) (130/73 - 155/91)  BP(mean): --  RR: 18 (14 Jun 2021 12:23) (18 - 18)  SpO2: 97% (13 Jun 2021 19:37) (97% - 97%)      06-13-21 @ 07:01  -  06-14-21 @ 07:00  --------------------------------------------------------  IN: 480 mL / OUT: 2250 mL / NET: -1770 mL    06-14-21 @ 07:01  - 06-14-21 @ 13:58  --------------------------------------------------------  IN: 360 mL / OUT: 1900 mL / NET: -1540 mL            Consultant(s) Notes Reviewed:  [x ] YES  [ ] NO          MEDICATIONS  (STANDING):  albuterol/ipratropium for Nebulization. 3 milliLiter(s) Nebulizer once  aspirin  chewable 81 milliGRAM(s) Oral daily  atorvastatin 80 milliGRAM(s) Oral at bedtime  budesonide 160 MICROgram(s)/formoterol 4.5 MICROgram(s) Inhaler 2 Puff(s) Inhalation two times a day  busPIRone 10 milliGRAM(s) Oral three times a day  chlorhexidine 4% Liquid 1 Application(s) Topical <User Schedule>  clopidogrel Tablet 75 milliGRAM(s) Oral daily  dextrose 40% Gel 15 Gram(s) Oral once  dextrose 5%. 1000 milliLiter(s) (50 mL/Hr) IV Continuous <Continuous>  dextrose 5%. 1000 milliLiter(s) (100 mL/Hr) IV Continuous <Continuous>  dextrose 50% Injectable 25 Gram(s) IV Push once  dextrose 50% Injectable 12.5 Gram(s) IV Push once  dextrose 50% Injectable 25 Gram(s) IV Push once  diltiazem Infusion 5 mG/Hr (5 mL/Hr) IV Continuous <Continuous>  enoxaparin Injectable 40 milliGRAM(s) SubCutaneous daily  furosemide   Injectable 40 milliGRAM(s) IV Push every 12 hours  glucagon  Injectable 1 milliGRAM(s) IntraMuscular once  insulin glargine Injectable (LANTUS) 12 Unit(s) SubCutaneous at bedtime  insulin lispro (ADMELOG) corrective regimen sliding scale   SubCutaneous three times a day before meals  insulin lispro Injectable (ADMELOG) 4 Unit(s) SubCutaneous three times a day before meals  losartan 100 milliGRAM(s) Oral daily  magnesium oxide 400 milliGRAM(s) Oral three times a day with meals  metoprolol succinate  milliGRAM(s) Oral daily  polyethylene glycol 3350 17 Gram(s) Oral two times a day  QUEtiapine 100 milliGRAM(s) Oral at bedtime  risperiDONE   Tablet 1 milliGRAM(s) Oral daily  risperiDONE   Tablet 3 milliGRAM(s) Oral at bedtime  senna 2 Tablet(s) Oral at bedtime    MEDICATIONS  (PRN):  bisacodyl Suppository 10 milliGRAM(s) Rectal daily PRN Constipation      LABS                          12.4   5.03  )-----------( 286      ( 14 Jun 2021 04:30 )             41.0     06-14    143  |  100  |  22<H>  ----------------------------<  118<H>  3.9   |  34<H>  |  0.8    Ca    9.0      14 Jun 2021 04:30  Mg     1.7     06-14    TPro  6.5  /  Alb  3.5  /  TBili  0.6  /  DBili  x   /  AST  21  /  ALT  11  /  AlkPhos  98  06-14          Lactate Trend        CAPILLARY BLOOD GLUCOSE      POCT Blood Glucose.: 188 mg/dL (14 Jun 2021 11:35)        RADIOLOGY & ADDITIONAL TESTS:    Imaging Personally Reviewed:  [ ] YES  [ ] NO    HEALTH ISSUES - PROBLEM Dx:          PHYSICAL EXAM:  GENERAL: NAD, well-developed.  HEAD:  Atraumatic, Normocephalic.  EYES: EOMI, PERRLA, conjunctiva and sclera clear.  NECK: Supple, JVD  CHEST/LUNG: Bibasilar rales, scattered expiratory wheezing.    HEART: Regular rate and rhythm; S1 S2. Tachycardic.   ABDOMEN: Soft, Nontender, Nondistended; Bowel sounds present.  EXTREMITIES: LE edema 2+,  PSYCH: AAOx3.  NEUROLOGY: non-focal.  SKIN: No rashes or lesions.

## 2021-06-14 NOTE — CONSULT NOTE ADULT - ASSESSMENT
IMPRESSION:     Acute decompensated HFrEF   Undefined CMP- likely SVT/tachycardia-induced   HTN   Hx CVA w/ residual weakness (wheel-chair)   DM 2  COPD on home O2     Recommendations:   c/w telemetry   c/w diuresis with PO torsemide 20 mg q12h  Patient is wants to defer invasive work-up  Lexiscan   Aggressive rate-control   c/w ASA, plavix, losartan, metoprolol, lipitor  EP and HF f/u   IMPRESSION:     Acute decompensated HFrEF   Undefined CMP- likely SVT/tachycardia-induced   HTN   Hx CVA w/ residual weakness (wheel-chair)   DM 2  COPD on home O2     Recommendations:   c/w telemetry   c/w diuresis with PO torsemide 20 mg q12h  Patient wants to defer invasive work-up  Lexiscan   Aggressive rate-control   c/w ASA, plavix, losartan, metoprolol, lipitor  EP and HF f/u   IMPRESSION:     Acute decompensated HFrEF   Undefined CMP- likely SVT/tachycardia-induced   HTN   Hx CVA w/ residual weakness (wheel-chair)   DM 2  COPD on home O2     Recommendations:   c/w telemetry   c/w diuresis with PO torsemide 20 mg q12h  Patient wants to defer invasive work-up  Aggressive rate-control   c/w ASA, plavix, losartan, metoprolol, lipitor  EP and HF f/u

## 2021-06-14 NOTE — CONSULT NOTE ADULT - SUBJECTIVE AND OBJECTIVE BOX
HISTORY OF PRESENT ILLNESS: 56 year old female with a pmhx of chronic hypoxic respiratory failure (on 4L home O2) secondary to  HFrEF 38% (last exacerbation in 2020) , HTN, HLD, DM2, active smoker , CVA with residual LE weakness (2014, wheelchair bound),  COPD , mood /depression,  presents of worsening shortness of breath and increase weight with  B/L LE x 2weeks. Patient mentions that she hasnt been taking lasix for the past two weeks as she she ran out of it. Reports she is non compliant with fluid intake. Denies any chest pain, nausea, vomiting, urinary symptoms, fever, chills. Of note patient received 1 st dose of Moderna two weeks, due for 2nd shot on 8th june.     ER Course:  Vital Signs:  T(F): 99.6 (06 Jun 2021 19:33), Max: 99.6 (06 Jun 2021 19:33)  HR: 103 (06 Jun 2021 23:00) (102 - 103)  BP: 151/95 (06 Jun 2021 23:00) (151/95 - 160/99)  RR: 20 (06 Jun 2021 23:00) (20 - 20)  SpO2: 94% (06 Jun 2021 23:00) (94% - 98%)    Patient was placed on 6l, improved , s/p 40mg lasix        PAST MEDICAL & SURGICAL HISTORY:  Hypertension    Hyperlipidemia    Anxiety and depression    COPD, severe    CHF (congestive heart failure)    Cerebrovascular accident (CVA)  Multiple    Type 2 diabetes mellitus    CKD (chronic kidney disease), stage II    No significant past surgical history        FAMILY HISTORY:  [x] no pertinent family history of premature cardiovascular disease in first degree relatives.  Mother:   Father:   Siblings:     SOCIAL HISTORY:    [ ] Non-smoker  [x] Smoker: 1 cig/day, ~20 PY  [ ] Alcohol    Allergies    No Known Allergies    Intolerances    	    REVIEW OF SYSTEMS:  CONSTITUTIONAL: denies fever, weight loss, or fatigue  CARDIOLOGY: see hpi  RESPIRATORY: see hpi  NEUROLOGICAL: denies weakness, no focal deficits to report.  ENDOCRINOLOGICAL: no recent change in diabetic medications.   GI: no BRBPR, no N,V, diarrhea.    PSYCHIATRY: normal mood and affect  HEENT: no nasal discharge, no ecchymosis  SKIN: no ecchymosis, no breakdown  MUSCULOSKELETAL: Full range of motion x4.     PHYSICAL EXAM:  T(C): 36.9 (06-14-21 @ 21:00), Max: 36.9 (06-14-21 @ 21:00)  HR: 73 (06-14-21 @ 23:28) (73 - 118)  BP: 98/68 (06-14-21 @ 23:28) (98/68 - 155/91)  RR: 16 (06-14-21 @ 21:00) (16 - 18)  SpO2: 97% (06-14-21 @ 19:50) (97% - 97%)  Wt(kg): --  I&O's Summary    13 Jun 2021 07:01  -  14 Jun 2021 07:00  --------------------------------------------------------  IN: 480 mL / OUT: 2250 mL / NET: -1770 mL    14 Jun 2021 07:01  -  14 Jun 2021 23:44  --------------------------------------------------------  IN: 1172.5 mL / OUT: 3200 mL / NET: -2027.5 mL        General Appearance: well appearing, normal for age and gender. 	  Neck: elevated JVP, no bruit.   Eyes: No xanthomalasia, Extra Ocular muscles intact.   Cardiovascular: regular rate and rhythm S1 S2, + JVD, No murmurs, +1 nichelle. ankle edema  Respiratory: bibasilar crackles  Psychiatry: Alert and oriented x 3, Mood & affect appropriate  Gastrointestinal:  Soft, Non-tender  Skin/Integumen: No rashes, No ecchymoses, No cyanosis	  Neurologic: Non-focal  Musculoskeletal/extremities: Normal range of motion, No clubbing, cyanosis or edema  Vascular: Peripheral pulses palpable 2+ bilaterally    LABS:	 	                          12.4   5.03  )-----------( 286      ( 14 Jun 2021 04:30 )             41.0     06-14    143  |  100  |  22<H>  ----------------------------<  118<H>  3.9   |  34<H>  |  0.8    Ca    9.0      14 Jun 2021 04:30  Mg     1.7     06-14    TPro  6.5  /  Alb  3.5  /  TBili  0.6  /  DBili  x   /  AST  21  /  ALT  11  /  AlkPhos  98  06-14      TELEMETRY EVENTS: 	  Sinus rhythm, AT since ~3 AM today  ECG:  	AT with 2:1 AV block @ 116 bpm, IVCD  RADIOLOGY:< from: Xray Chest 1 View AP/PA (06.14.21 @ 14:57) >    Impression:    Stable cardiomegaly.    Diminishing bilateral opacities.    < end of copied text >    OTHER: 	    PREVIOUS DIAGNOSTIC TESTING:    [ ] Echocardiogram:  < from: TTE Echo Complete w/o Contrast w/ Doppler (06.14.21 @ 14:28) >    Summary:   1. Left ventricular ejection fraction, by visual estimation, is 35 to 40%.   2.Moderately decreased global left ventricular systolic function.   3. Multiple left ventricular regional wall motion abnormalities exist. See wall motion findings.   4. Elevated left atrial and left ventricular end-diastolic pressures.   5. Mild concentric left ventricular hypertrophy.   6. Mildly increased LV wall thickness.   7. Normal left ventricular internal cavity size.   8. Spectral Doppler shows restrictive pattern of left ventricular myocardial filling (Grade III diastolic dysfunction).  9. Moderately reduced RV systolic function.  10. Mildly enlarged left atrium.  11. Moderately enlarged right atrium.  12. Small pericardial effusion.  13. Mild to moderate mitral valve regurgitation.  14. Moderate-severe tricuspid regurgitation.  15.Mild aortic regurgitation.  16. Sclerotic aortic valve with normal opening.  17. Estimated pulmonary artery systolic pressure is 50.0 mmHg assuming a right atrial pressure of 15 mmHg, which is consistent with moderate pulmonary hypertension.  18. Pulmonary hypertension is present.  19. LA volume Index is 36.7 ml/m² ml/m2.    PHYSICIAN INTERPRETATION:  Left Ventricle: The left ventricular internal cavity size is normal. Left ventricular wall thickness is mildly increased. There is mild concentric left ventricular hypertrophy. Global LV systolic function was moderately decreased. Left ventricular ejection fraction, by visual estimation, is 35 to 40%. Spectral Doppler shows restrictive pattern of left ventricular myocardial filling (Grade III diastolic dysfunction). Elevated left atrial and left ventricular end-diastolic pressures.    < end of copied text >    [ ] Catheterization: 2016: Non-obstructive CAD: 60 % distal LCx, 20% prox and mRCA. LAD luminal irregularities.    	    Home Medications:  Albuterol (Eqv-ProAir HFA) 90 mcg/inh inhalation aerosol: 2 puff(s) inhaled every 6 hours, As Needed (07 Jun 2021 11:22)  aspirin 81 mg oral tablet: 1 tab(s) orally once a day (07 Jun 2021 11:22)  BuSpar 10 mg oral tablet: 1 tab(s) orally 3 times a day (07 Jun 2021 11:22)  fenofibrate 145 mg oral tablet: 1 tab(s) orally once a day (07 Jun 2021 11:22)  glipiZIDE 10 mg oral tablet: 1 tab(s) orally 2 times a day (07 Jun 2021 11:22)  metFORMIN 1000 mg oral tablet: 1 tab(s) orally 2 times a day (07 Jun 2021 11:22)  Metoprolol Tartrate 50 mg oral tablet: 1 tab(s) orally 2 times a day (07 Jun 2021 11:22)  Norvasc 10 mg oral tablet: 1 tab(s) orally once a day (07 Jun 2021 11:22)  Plavix 75 mg oral tablet: 1 tab(s) orally once a day (07 Jun 2021 11:22)  SEROquel 100 mg oral tablet: 2 tab(s) orally once a day (at bedtime) (07 Jun 2021 11:22)  Tresiba 100 units/mL subcutaneous solution: 50 unit(s) subcutaneous once a day (07 Jun 2021 11:22)    MEDICATIONS  (STANDING):  albuterol/ipratropium for Nebulization. 3 milliLiter(s) Nebulizer once  aspirin  chewable 81 milliGRAM(s) Oral daily  atorvastatin 80 milliGRAM(s) Oral at bedtime  budesonide 160 MICROgram(s)/formoterol 4.5 MICROgram(s) Inhaler 2 Puff(s) Inhalation two times a day  busPIRone 10 milliGRAM(s) Oral three times a day  chlorhexidine 4% Liquid 1 Application(s) Topical <User Schedule>  clopidogrel Tablet 75 milliGRAM(s) Oral daily  dextrose 40% Gel 15 Gram(s) Oral once  dextrose 5%. 1000 milliLiter(s) (50 mL/Hr) IV Continuous <Continuous>  dextrose 5%. 1000 milliLiter(s) (100 mL/Hr) IV Continuous <Continuous>  dextrose 50% Injectable 25 Gram(s) IV Push once  dextrose 50% Injectable 12.5 Gram(s) IV Push once  dextrose 50% Injectable 25 Gram(s) IV Push once  diltiazem Infusion 10 mG/Hr (10 mL/Hr) IV Continuous <Continuous>  enoxaparin Injectable 40 milliGRAM(s) SubCutaneous daily  glucagon  Injectable 1 milliGRAM(s) IntraMuscular once  insulin glargine Injectable (LANTUS) 12 Unit(s) SubCutaneous at bedtime  insulin lispro (ADMELOG) corrective regimen sliding scale   SubCutaneous three times a day before meals  insulin lispro Injectable (ADMELOG) 4 Unit(s) SubCutaneous three times a day before meals  losartan 100 milliGRAM(s) Oral daily  magnesium oxide 400 milliGRAM(s) Oral three times a day with meals  metoprolol succinate  milliGRAM(s) Oral daily  polyethylene glycol 3350 17 Gram(s) Oral two times a day  QUEtiapine 100 milliGRAM(s) Oral at bedtime  risperiDONE   Tablet 1 milliGRAM(s) Oral daily  risperiDONE   Tablet 3 milliGRAM(s) Oral at bedtime  senna 2 Tablet(s) Oral at bedtime  torsemide 20 milliGRAM(s) Oral two times a day    MEDICATIONS  (PRN):  bisacodyl Suppository 10 milliGRAM(s) Rectal daily PRN Constipation         HISTORY OF PRESENT ILLNESS: 56 year old female with a pmhx of chronic hypoxic respiratory failure (on 4L home O2) secondary to  HFrEF 38% (last exacerbation in 2020) , HTN, HLD, DM2, active smoker , CVA with residual LE weakness (2014, wheelchair bound),  COPD , mood /depression,  presents of worsening shortness of breath and increase weight with  B/L LE x 2weeks. Patient mentions that she hasnt been taking lasix for the past two weeks as she she ran out of it. Reports she is non compliant with fluid intake. Denies any chest pain, nausea, vomiting, urinary symptoms, fever, chills. Of note patient received 1 st dose of Moderna two weeks, due for 2nd shot on 8th june.     ER Course:  Vital Signs:  T(F): 99.6 (06 Jun 2021 19:33), Max: 99.6 (06 Jun 2021 19:33)  HR: 103 (06 Jun 2021 23:00) (102 - 103)  BP: 151/95 (06 Jun 2021 23:00) (151/95 - 160/99)  RR: 20 (06 Jun 2021 23:00) (20 - 20)  SpO2: 94% (06 Jun 2021 23:00) (94% - 98%)    Patient was placed on 6l, improved , s/p 40mg lasix        PAST MEDICAL & SURGICAL HISTORY:  Hypertension    Hyperlipidemia    Anxiety and depression    COPD, severe    CHF (congestive heart failure)    Cerebrovascular accident (CVA)  Multiple    Type 2 diabetes mellitus    CKD (chronic kidney disease), stage II    No significant past surgical history        FAMILY HISTORY:  [x] no pertinent family history of premature cardiovascular disease in first degree relatives.  Mother:   Father:   Siblings:     SOCIAL HISTORY:    [ ] Non-smoker  [x] Smoker: 1 cig/day, ~20 PY  [ ] Alcohol    Allergies  No Known Allergies      REVIEW OF SYSTEMS:  CONSTITUTIONAL: No fever, weight loss, fatigue  NECK: No pain or stiffness  RESPIRATORY: See HPI  CARDIOVASCULAR: See HPI  GASTROINTESTINAL: No abdominal/epigastric pain, nausea, vomiting, hematemesis, diarrhea, constipation, melena or hematochezia  GENITOURINARY: No dysuria, frequency, hematuria, incontinence  NEUROLOGICAL: No headaches, memory loss, loss of strength, numbness, tremors  SKIN: No itching, burning, rashes, lesions   ENDOCRINE: No heat/cold intolerance or hair loss  MUSCULOSKELETAL: No joint pain or swelling  HEME/LYMPH: No easy bruising or bleeding gums    PHYSICAL EXAM:  T(C): 36.9 (06-14-21 @ 21:00), Max: 36.9 (06-14-21 @ 21:00)  HR: 73 (06-14-21 @ 23:28) (73 - 118)  BP: 98/68 (06-14-21 @ 23:28) (98/68 - 155/91)  RR: 16 (06-14-21 @ 21:00) (16 - 18)  SpO2: 97% (06-14-21 @ 19:50) (97% - 97%)  Wt(kg): --  I&O's Summary    13 Jun 2021 07:01  -  14 Jun 2021 07:00  --------------------------------------------------------  IN: 480 mL / OUT: 2250 mL / NET: -1770 mL    14 Jun 2021 07:01  -  14 Jun 2021 23:44  --------------------------------------------------------  IN: 1172.5 mL / OUT: 3200 mL / NET: -2027.5 mL        General Appearance: well appearing, normal for age and gender. 	  Eyes: No xanthomalasia, Extra Ocular muscles intact.   Cardiovascular: regular rate and rhythm S1 S2, + JVD, No murmurs, +1 nichelle. ankle edema  Respiratory: bibasilar crackles  Psychiatry: Alert and oriented x 3, Mood & affect appropriate  Gastrointestinal:  Soft, Non-tender  Musculoskeletal/extremities: No clubbing, cyanosis or edema  Vascular: Peripheral pulses palpable 2+ bilaterally      LABS:	 	                          12.4   5.03  )-----------( 286      ( 14 Jun 2021 04:30 )             41.0     06-14    143  |  100  |  22<H>  ----------------------------<  118<H>  3.9   |  34<H>  |  0.8    Ca    9.0      14 Jun 2021 04:30  Mg     1.7     06-14    TPro  6.5  /  Alb  3.5  /  TBili  0.6  /  DBili  x   /  AST  21  /  ALT  11  /  AlkPhos  98  06-14      TELEMETRY EVENTS: 	  Sinus rhythm, AT since ~3 AM today  ECG:  	AT with 2:1 AV block @ 116 bpm, IVCD      RADIOLOGY:  < from: Xray Chest 1 View AP/PA (06.14.21 @ 14:57) >  Impression:    Stable cardiomegaly.    Diminishing bilateral opacities.        < from: TTE Echo Complete w/o Contrast w/ Doppler (06.14.21 @ 14:28) >    Summary:   1. Left ventricular ejection fraction, by visual estimation, is 35 to 40%.   2.Moderately decreased global left ventricular systolic function.   3. Multiple left ventricular regional wall motion abnormalities exist. See wall motion findings.   4. Elevated left atrial and left ventricular end-diastolic pressures.   5. Mild concentric left ventricular hypertrophy.   6. Mildly increased LV wall thickness.   7. Normal left ventricular internal cavity size.   8. Spectral Doppler shows restrictive pattern of left ventricular myocardial filling (Grade III diastolic dysfunction).  9. Moderately reduced RV systolic function.  10. Mildly enlarged left atrium.  11. Moderately enlarged right atrium.  12. Small pericardial effusion.  13. Mild to moderate mitral valve regurgitation.  14. Moderate-severe tricuspid regurgitation.  15.Mild aortic regurgitation.  16. Sclerotic aortic valve with normal opening.  17. Estimated pulmonary artery systolic pressure is 50.0 mmHg assuming a right atrial pressure of 15 mmHg, which is consistent with moderate pulmonary hypertension.  18. Pulmonary hypertension is present.  19. LA volume Index is 36.7 ml/m² ml/m2.    PHYSICIAN INTERPRETATION:  Left Ventricle: The left ventricular internal cavity size is normal. Left ventricular wall thickness is mildly increased. There is mild concentric left ventricular hypertrophy. Global LV systolic function was moderately decreased. Left ventricular ejection fraction, by visual estimation, is 35 to 40%. Spectral Doppler shows restrictive pattern of left ventricular myocardial filling (Grade III diastolic dysfunction). Elevated left atrial and left ventricular end-diastolic pressures.    < end of copied text >      [ ] Catheterization: 2016: Non-obstructive CAD: 60 % distal LCx, 20% prox and mRCA. LAD luminal irregularities.      Home Medications:  Albuterol (Eqv-ProAir HFA) 90 mcg/inh inhalation aerosol: 2 puff(s) inhaled every 6 hours, As Needed (07 Jun 2021 11:22)  aspirin 81 mg oral tablet: 1 tab(s) orally once a day (07 Jun 2021 11:22)  BuSpar 10 mg oral tablet: 1 tab(s) orally 3 times a day (07 Jun 2021 11:22)  fenofibrate 145 mg oral tablet: 1 tab(s) orally once a day (07 Jun 2021 11:22)  glipiZIDE 10 mg oral tablet: 1 tab(s) orally 2 times a day (07 Jun 2021 11:22)  metFORMIN 1000 mg oral tablet: 1 tab(s) orally 2 times a day (07 Jun 2021 11:22)  Metoprolol Tartrate 50 mg oral tablet: 1 tab(s) orally 2 times a day (07 Jun 2021 11:22)  Norvasc 10 mg oral tablet: 1 tab(s) orally once a day (07 Jun 2021 11:22)  Plavix 75 mg oral tablet: 1 tab(s) orally once a day (07 Jun 2021 11:22)  SEROquel 100 mg oral tablet: 2 tab(s) orally once a day (at bedtime) (07 Jun 2021 11:22)  Tresiba 100 units/mL subcutaneous solution: 50 unit(s) subcutaneous once a day (07 Jun 2021 11:22)    MEDICATIONS  (STANDING):  albuterol/ipratropium for Nebulization. 3 milliLiter(s) Nebulizer once  aspirin  chewable 81 milliGRAM(s) Oral daily  atorvastatin 80 milliGRAM(s) Oral at bedtime  budesonide 160 MICROgram(s)/formoterol 4.5 MICROgram(s) Inhaler 2 Puff(s) Inhalation two times a day  busPIRone 10 milliGRAM(s) Oral three times a day  chlorhexidine 4% Liquid 1 Application(s) Topical <User Schedule>  clopidogrel Tablet 75 milliGRAM(s) Oral daily  dextrose 40% Gel 15 Gram(s) Oral once  dextrose 5%. 1000 milliLiter(s) (50 mL/Hr) IV Continuous <Continuous>  dextrose 5%. 1000 milliLiter(s) (100 mL/Hr) IV Continuous <Continuous>  dextrose 50% Injectable 25 Gram(s) IV Push once  dextrose 50% Injectable 12.5 Gram(s) IV Push once  dextrose 50% Injectable 25 Gram(s) IV Push once  diltiazem Infusion 10 mG/Hr (10 mL/Hr) IV Continuous <Continuous>  enoxaparin Injectable 40 milliGRAM(s) SubCutaneous daily  glucagon  Injectable 1 milliGRAM(s) IntraMuscular once  insulin glargine Injectable (LANTUS) 12 Unit(s) SubCutaneous at bedtime  insulin lispro (ADMELOG) corrective regimen sliding scale   SubCutaneous three times a day before meals  insulin lispro Injectable (ADMELOG) 4 Unit(s) SubCutaneous three times a day before meals  losartan 100 milliGRAM(s) Oral daily  magnesium oxide 400 milliGRAM(s) Oral three times a day with meals  metoprolol succinate  milliGRAM(s) Oral daily  polyethylene glycol 3350 17 Gram(s) Oral two times a day  QUEtiapine 100 milliGRAM(s) Oral at bedtime  risperiDONE   Tablet 1 milliGRAM(s) Oral daily  risperiDONE   Tablet 3 milliGRAM(s) Oral at bedtime  senna 2 Tablet(s) Oral at bedtime  torsemide 20 milliGRAM(s) Oral two times a day    MEDICATIONS  (PRN):  bisacodyl Suppository 10 milliGRAM(s) Rectal daily PRN Constipation

## 2021-06-14 NOTE — DISCHARGE NOTE PROVIDER - NSDCMRMEDTOKEN_GEN_ALL_CORE_FT
Albuterol (Eqv-ProAir HFA) 90 mcg/inh inhalation aerosol: 2 puff(s) inhaled every 6 hours, As Needed  aspirin 81 mg oral tablet: 1 tab(s) orally once a day  atorvastatin 80 mg oral tablet: 1 tab(s) orally once a day (at bedtime)  budesonide-formoterol 160 mcg-4.5 mcg/inh inhalation aerosol: 2 puff(s) inhaled 2 times a day   BuSpar 10 mg oral tablet: 1 tab(s) orally 3 times a day  fenofibrate 145 mg oral tablet: 1 tab(s) orally once a day  glipiZIDE 10 mg oral tablet: 1 tab(s) orally 2 times a day  losartan 100 mg oral tablet: 1 tab(s) orally once a day  metFORMIN 1000 mg oral tablet: 1 tab(s) orally 2 times a day  Metoprolol Tartrate 50 mg oral tablet: 1 tab(s) orally 2 times a day  Norvasc 10 mg oral tablet: 1 tab(s) orally once a day  Plavix 75 mg oral tablet: 1 tab(s) orally once a day  senna oral tablet: 2 tab(s) orally once a day (at bedtime), As Needed -for constipation   SEROquel 100 mg oral tablet: 2 tab(s) orally once a day (at bedtime)  Tresiba 100 units/mL subcutaneous solution: 50 unit(s) subcutaneous once a day   Albuterol (Eqv-ProAir HFA) 90 mcg/inh inhalation aerosol: 2 puff(s) inhaled every 6 hours, As Needed  amiodarone 200 mg oral tablet: 1 tab(s) orally 2 times a day   aspirin 81 mg oral tablet: 1 tab(s) orally once a day  atorvastatin 80 mg oral tablet: 1 tab(s) orally once a day (at bedtime)  budesonide-formoterol 160 mcg-4.5 mcg/inh inhalation aerosol: 2 puff(s) inhaled 2 times a day   BuSpar 10 mg oral tablet: 1 tab(s) orally 3 times a day  fenofibrate 145 mg oral tablet: 1 tab(s) orally once a day  glipiZIDE 10 mg oral tablet: 1 tab(s) orally 2 times a day  losartan 100 mg oral tablet: 1 tab(s) orally once a day  metFORMIN 1000 mg oral tablet: 1 tab(s) orally 2 times a day Do not take until 6/23  Metoprolol Tartrate 50 mg oral tablet: 1 tab(s) orally 2 times a day  Plavix 75 mg oral tablet: 1 tab(s) orally once a day  senna oral tablet: 2 tab(s) orally once a day (at bedtime), As Needed -for constipation   SEROquel 100 mg oral tablet: 2 tab(s) orally once a day (at bedtime)  torsemide 20 mg oral tablet: 1 tab(s) orally 2 times a day  Tresiba 100 units/mL subcutaneous solution: 50 unit(s) subcutaneous once a day   Albuterol (Eqv-ProAir HFA) 90 mcg/inh inhalation aerosol: 2 puff(s) inhaled every 6 hours, As Needed  amiodarone 200 mg oral tablet: 1 tab(s) orally 2 times a day   aspirin 81 mg oral tablet: 1 tab(s) orally once a day  atorvastatin 80 mg oral tablet: 1 tab(s) orally once a day (at bedtime)  budesonide-formoterol 160 mcg-4.5 mcg/inh inhalation aerosol: 2 puff(s) inhaled 2 times a day   BuSpar 10 mg oral tablet: 1 tab(s) orally 3 times a day  divalproex sodium 250 mg oral delayed release tablet: 1 tab(s) orally 2 times a day MDD:2 tabs  Entresto 49 mg-51 mg oral tablet: 1 tab(s) orally 2 times a day   fenofibrate 145 mg oral tablet: 1 tab(s) orally once a day  glipiZIDE 10 mg oral tablet: 1 tab(s) orally 2 times a day  metFORMIN 1000 mg oral tablet: 1 tab(s) orally 2 times a day Do not take until 6/23  Metoprolol Succinate ER 50 mg oral tablet, extended release: 1 tab(s) orally once a day   Plavix 75 mg oral tablet: 1 tab(s) orally once a day  senna oral tablet: 2 tab(s) orally once a day (at bedtime), As Needed -for constipation   torsemide 20 mg oral tablet: 1 tab(s) orally 2 times a day  Tresiba 100 units/mL subcutaneous solution: 50 unit(s) subcutaneous once a day   Albuterol (Eqv-ProAir HFA) 90 mcg/inh inhalation aerosol: 2 puff(s) inhaled every 6 hours, As Needed  amiodarone 200 mg oral tablet: 1 tab(s) orally 2 times a day   aspirin 81 mg oral tablet: 1 tab(s) orally once a day  Ativan 1 mg oral tablet: 1 tab(s) orally once a day (at bedtime) MDD:1 tab  atorvastatin 80 mg oral tablet: 1 tab(s) orally once a day (at bedtime)  budesonide-formoterol 160 mcg-4.5 mcg/inh inhalation aerosol: 2 puff(s) inhaled 2 times a day   BuSpar 10 mg oral tablet: 1 tab(s) orally 3 times a day  divalproex sodium 250 mg oral delayed release tablet: 1 tab(s) orally 2 times a day MDD:2 tabs  Entresto 49 mg-51 mg oral tablet: 1 tab(s) orally 2 times a day   fenofibrate 145 mg oral tablet: 1 tab(s) orally once a day  glipiZIDE 10 mg oral tablet: 1 tab(s) orally 2 times a day  metFORMIN 1000 mg oral tablet: 1 tab(s) orally 2 times a day Do not take until 6/23  Metoprolol Succinate ER 50 mg oral tablet, extended release: 1 tab(s) orally once a day   Plavix 75 mg oral tablet: 1 tab(s) orally once a day  senna oral tablet: 2 tab(s) orally once a day (at bedtime), As Needed -for constipation   torsemide 20 mg oral tablet: 1 tab(s) orally 2 times a day  Tresiba 100 units/mL subcutaneous solution: 50 unit(s) subcutaneous once a day

## 2021-06-14 NOTE — PROGRESS NOTE ADULT - ASSESSMENT
Acute on chronic systolic HF /fluid overload     Patient remains fluid overloaded on exam IVC 2.6 CM  Get BMP daily   Switch to Torsemide 20 mg BID starting this afternoon  Continue metoprolol  mg daily   Continue Losartan 100 mg daily   Maintain potassium >4.0, Mg >2.1  Strict intake and output  Ischemic work up needed once euvolemic if not done in the past   PT evaluation   Daily weight   Discussed with primary team  Will continue to follow   Acute on chronic systolic HF /fluid overload     Patient remains fluid overloaded on exam IVC 2.6 CM  Get BMP daily   Switch to Torsemide 20 mg BID starting this afternoon  Continue metoprolol  mg daily   Get 12 lead ECG for tachycardia -110s bpm  Continue Losartan 100 mg daily   Maintain potassium >4.0, Mg >2.1  Strict intake and output  Ischemic work up needed once euvolemic if not done in the past   PT evaluation   Daily weight   Discussed with primary team  Will continue to follow

## 2021-06-14 NOTE — CONSULT NOTE ADULT - ASSESSMENT
Assessment: 56 year old woman with PMH chronic hypoxic respiratory failure (on 4L home O2) secondary to  HFrEF 38%, HTN, HLD, DM2, CORNELIUS on CPAP, CVA with residual LE weakness (2014, wheelchair bound),  COPD, depression who presents of worsening shortness of breath and increase weight with  B/L LE x 2weeks. Admitted for acute on chronic CHF exacerbation    Impression:  HF Exacerbation  Atrial Tachycardia, likely AVNRT  COPD on 4L Home O2  HTN  HLD  DM  CORNELIUS on CPAP  Hx CVA    Plan:  - Start Cardizem gtt 5mg/hr  - Consult pulmonary to assess for ILD, if cleared then will likely switch to Amiodarone for better rate control  - 2D Echo  - Check TSH, free T4  - Can consider ablation in the future, patient not interested at this time but will follow up in the office  - Will follow

## 2021-06-14 NOTE — CONSULT NOTE ADULT - ATTENDING COMMENTS
## HFrEF  ## Severe COPD on Home O2  ## Atrial Tachycardia vs AVNRT    - Discussed management options with patient. She doesn't want anything invasive  - We will start with cardizem for now. Amiodarone if ok with pulmonary  - Echo  - TSH  - Will continue to f-up
Patient is refusing nuclear stress testing and invasive angiography.  Risks and benefits explained and she verbalizes understanding.

## 2021-06-14 NOTE — DISCHARGE NOTE PROVIDER - NSDCFUADDINST_GEN_ALL_CORE_FT
Please follow up with your scheduled outpatient appointments and take your medications as prescribed. Please follow up with your scheduled outpatient appointments and take your medications as prescribed. Please follow up with psychiatry for your psych medications.

## 2021-06-14 NOTE — DISCHARGE NOTE PROVIDER - HOSPITAL COURSE
Patient is a 55 y/o woman with PMH chronic hypoxic respiratory failure (on 4L home O2) secondary to HFrEF, HTN, HLD, DM II, CVA (residual LE weakness; wheelchair bound), COPD, mood disorder who presents for dyspnea. Patient had not been taking furosemide over last 2 weeks after running out of medication. BNP elevated to 7000 on admission with bilateral haziness on CXR. Patient admitted for acute on chronic CHF exacerbation. Patient diuresed with IV furosemide with improvement. Patient stable for discharge home with outpatient follow-up. Patient is a 57 y/o woman with PMH chronic hypoxic respiratory failure (on 4L home O2) secondary to HFrEF, HTN, HLD, DM II, CVA (residual LE weakness; wheelchair bound), COPD, mood disorder who presents for dyspnea. Patient had not been taking furosemide over last 2 weeks after running out of medication. BNP elevated to 7000 on admission with bilateral haziness on CXR. Patient admitted for acute on chronic CHF exacerbation. Patient diuresed with IV furosemide with improvement. Patient also found to be tachycardic; EKG showing narrow-complex non-sinus rhythm. Patient seen by EP; started on cardizem drip with conversion to normal sinus rhythm. Patient then loaded with amiodarone oral. Patient stable for discharge home with outpatient follow-up. Patient is a 57 y/o woman with PMH chronic hypoxic respiratory failure (on 4L home O2) secondary to HFrEF, HTN, HLD, DM II, CVA (residual LE weakness; wheelchair bound), COPD, mood disorder who presents for dyspnea. Patient had not been taking furosemide over last 2 weeks after running out of medication. BNP elevated to 7000 on admission with bilateral haziness on CXR. Patient admitted for acute on chronic CHF exacerbation. Patient diuresed with IV furosemide with improvement. Patient also found to be tachycardic; EKG showing narrow-complex non-sinus rhythm. Patient seen by EP; started on cardizem drip with conversion to normal sinus rhythm. Underwent cardiac cath, found to have 90% stenosis of RCA. To be dc home with aspirin, plavix. Patient is a 57 y/o woman with PMH chronic hypoxic respiratory failure (on 4L home O2) secondary to HFrEF, HTN, HLD, DM II, CVA (residual LE weakness; wheelchair bound), COPD, mood disorder who presents for dyspnea. Patient had not been taking furosemide over last 2 weeks after running out of medication. BNP elevated to 7000 on admission with bilateral haziness on CXR. Patient admitted for acute on chronic CHF exacerbation. Patient diuresed with IV furosemide with improvement. Patient also found to be tachycardic; EKG showing narrow-complex non-sinus rhythm. Patient seen by EP; started on cardizem drip with conversion to normal sinus rhythm. Underwent cardiac cath, found to have 90% stenosis of RCA. To be dc home with aspirin, Plavix.    Hospital Course:   Patient was admitted for CHF exacerbation chest x-ray showed pulmonary vascular congestion and cardiomegaly. Patient's BNP was elevated. Patient was diuresed with IV Lasix and resolved her exacerbation. Patient had an echo which showed LVEF 38%, grade II diastolic dysfunction, mild to mod MR, mild to mod TR, mild AR, mod Pulm HTN. Cardiology was consulted and patient underwent cardiac catherization with PCI with RICARDO to RCA. Patient needs to continue on DAPT, beta blocker, and statin therapy. Hold metformin 48 hours post cath and reinforce medication compliance.

## 2021-06-14 NOTE — PROGRESS NOTE ADULT - SUBJECTIVE AND OBJECTIVE BOX
WILLIAN MARY 56y Female  MRN#: [id number]   CODE STATUS:FULL      SUBJECTIVE  Patient is a 56y old Female who presents with a chief complaint of CHF exacerbation (14 Jun 2021 08:42)  Currently admitted to medicine with the primary diagnosis of CHF exacerbation    Patient examined at bedside. Some tachycardia overnight. EKG performed; non-sinus narrow complex tachycardia. No other events.    OBJECTIVE  PAST MEDICAL & SURGICAL HISTORY  Hypertension    Hyperlipidemia    Anxiety and depression    COPD, severe    CHF (congestive heart failure)    Cerebrovascular accident (CVA)  Multiple    Type 2 diabetes mellitus    CKD (chronic kidney disease), stage II    No significant past surgical history      ALLERGIES:  No Known Allergies    MEDICATIONS:  STANDING MEDICATIONS  albuterol/ipratropium for Nebulization. 3 milliLiter(s) Nebulizer once  aspirin  chewable 81 milliGRAM(s) Oral daily  atorvastatin 80 milliGRAM(s) Oral at bedtime  budesonide 160 MICROgram(s)/formoterol 4.5 MICROgram(s) Inhaler 2 Puff(s) Inhalation two times a day  busPIRone 10 milliGRAM(s) Oral three times a day  chlorhexidine 4% Liquid 1 Application(s) Topical <User Schedule>  clopidogrel Tablet 75 milliGRAM(s) Oral daily  dextrose 40% Gel 15 Gram(s) Oral once  dextrose 5%. 1000 milliLiter(s) IV Continuous <Continuous>  dextrose 5%. 1000 milliLiter(s) IV Continuous <Continuous>  dextrose 50% Injectable 25 Gram(s) IV Push once  dextrose 50% Injectable 12.5 Gram(s) IV Push once  dextrose 50% Injectable 25 Gram(s) IV Push once  enoxaparin Injectable 40 milliGRAM(s) SubCutaneous daily  furosemide   Injectable 40 milliGRAM(s) IV Push every 12 hours  glucagon  Injectable 1 milliGRAM(s) IntraMuscular once  insulin glargine Injectable (LANTUS) 12 Unit(s) SubCutaneous at bedtime  insulin lispro (ADMELOG) corrective regimen sliding scale   SubCutaneous three times a day before meals  insulin lispro Injectable (ADMELOG) 4 Unit(s) SubCutaneous three times a day before meals  losartan 100 milliGRAM(s) Oral daily  magnesium oxide 400 milliGRAM(s) Oral three times a day with meals  metoprolol succinate  milliGRAM(s) Oral daily  polyethylene glycol 3350 17 Gram(s) Oral two times a day  QUEtiapine 100 milliGRAM(s) Oral at bedtime  risperiDONE   Tablet 1 milliGRAM(s) Oral daily  risperiDONE   Tablet 3 milliGRAM(s) Oral at bedtime  senna 2 Tablet(s) Oral at bedtime    PRN MEDICATIONS  bisacodyl Suppository 10 milliGRAM(s) Rectal daily PRN      VITAL SIGNS: Last 24 Hours  T(C): 36.4 (14 Jun 2021 04:55), Max: 36.4 (14 Jun 2021 04:55)  T(F): 97.6 (14 Jun 2021 04:55), Max: 97.6 (14 Jun 2021 04:55)  HR: 114 (14 Jun 2021 04:55) (70 - 114)  BP: 133/96 (14 Jun 2021 04:55) (130/73 - 156/86)  BP(mean): --  RR: 18 (14 Jun 2021 04:55) (18 - 18)  SpO2: 97% (13 Jun 2021 19:37) (97% - 97%)    LABS:                        12.4   5.03  )-----------( 286      ( 14 Jun 2021 04:30 )             41.0     06-14    143  |  100  |  22<H>  ----------------------------<  118<H>  3.9   |  34<H>  |  0.8    Ca    9.0      14 Jun 2021 04:30  Mg     1.7     06-14    TPro  6.5  /  Alb  3.5  /  TBili  0.6  /  DBili  x   /  AST  21  /  ALT  11  /  AlkPhos  98  06-14      PHYSICAL EXAM:    GENERAL: NAD, well-developed, AAOx3  HEENT:  Atraumatic, Normocephalic. EOMI, PERRLA, conjunctiva and sclera clear, No JVD  PULMONARY: Expiratory wheeze B/L lung field  CARDIOVASCULAR: Regular rate and rhythm; No murmurs, rubs, or gallops  GASTROINTESTINAL: Soft, Nontender, Nondistended; Bowel sounds present  MUSCULOSKELETAL:  Mild swelling lower extremities, non-pitting  NEUROLOGY: non-focal  SKIN: No rashes or lesions    ASSESSMENT & PLAN    Patient is a 56 year old woman with PMH chronic hypoxic respiratory failure (on 4L home O2) secondary to  HFrEF 38%, HTN, HLD, DM2, CORNELIUS on CPAP, CVA with residual LE weakness (2014, wheelchair bound),  COPD, depression who presents of worsening shortness of breath and increase weight with  B/L LE x 2weeks. Admitted for acute on chronic CHF exacerbation.    1) Acute on chronic CHF exacerbation with resolved acute on chronic hypoxic respiratory failure  - Currently on 4L NC (on 4L at home)  - BNP 6/6: 7726  - CXR 6/6: cardiomegaly and bilateral interstitial opacities  - Continue IV lasix 40mg q12h  - Daily weights/ strict I+O/ fluid restrict  - Continue losartan 100mg q24hrs, metoprolol succinate 200mg q24hrs  - Heart failure consult appreciated   - Echo 2/2020: EF 38%, grade II diastolic dysfunction    2) Narrow complex tachycardia  -Seems to be A.flutter  -On metoprolol 200mg q24hrs  -EP consult appreciated  -On clopidogrel and ASA; may need full AC    3) DM II  - Lantus 12 qhs, lispro 4/4/4  - 6/7/21 A1c 6.5    4) HLD  - Continue atorvastatin 80mg qhs  - Lipid panel: LDL 42, HDL 29, triglycerides 183     5) COPD/pulmonary HTN  - Continue inhalers    6) CORNELIUS/OHS ? on CPAP  - CPAP at night  - Outpatient pulm follow-up    7) CVA with residual LE weakness  - c/w ASA/plavix/statin    8) Depression/ mood disorder  - Continue risperidone 3mg qhs, quetiapine 100mg qhs, buspirone 10mg TID    MISC:  - DVT ppx: lovenox  - PPI ppx: NA  - DIET: DASH carb consistent, fluid restriction  - Code Status: Full Code

## 2021-06-14 NOTE — DISCHARGE NOTE PROVIDER - CARE PROVIDER_API CALL
Joel Gaspar (MD)  [facility] Card[facility]; Cardiovascular Disease; Internal Medicine  [address] [place] [address]  Phone: [phone]  Fax: [phone]  Follow Up Time: 2 weeks   Joel Gaspar; MD)  [facility]  [address] [place] [address] [address] [place] [address]  Phone: [phone]  Fax: [phone]  Follow Up Time: 2 weeks    Elenita Combs)  Cardiovascular Disease; Internal Medicine  [address] [place] [address]  Phone: [phone]  Fax: [phone]  Follow Up Time:     Pardeep Soto)  Cardiac Electrophysiology; Cardiology; Internal Medicine  [address] [place] [address]  Phone: [phone]  Fax: [phone]  Follow Up Time:

## 2021-06-14 NOTE — PROGRESS NOTE ADULT - SUBJECTIVE AND OBJECTIVE BOX
Interval History:    - Patient resting in bed, no acute event over the weekend        HISTORY OF PRESENT ILLNESS:     56 year old female with a pmhx of chronic hypoxic respiratory failure (on 4L home O2) secondary to  HFrEF 38% (last exacerbation in ) , HTN, HLD, DM2, active smoker , CVA with residual LE weakness (, wheelchair bound),  COPD , mood /depression,  presents of worsening shortness of breath and increase weight with  B/L LE x 2weeks. Patient mentions that she hasn't been taking Lasix for the past two weeks as she ran out of it. Reports she is non compliant with fluid intake. Denies any chest pain, nausea, vomiting, urinary symptoms, fever, chills. Of note patient received 1 st dose of Moderna two weeks, due for 2nd shot on .    Patient reports worsening SOB for the past few weeks, now feeling better after diuresis. Patient endorses non-compliance with low sodium diet and was not aware she did not receive her Lasix on her medication package from the pharmacy. Patient denies c/p, palpitations, headaches, bleeding, LH, dizziness, orthopnea, PND, LOC, cough, feeling bloated, N/V/D.        PAST MEDICAL & SURGICAL HISTORY:    Hypertension  Hyperlipidemia  Anxiety and depression  COPD, severe  CHF (congestive heart failure)  Cerebrovascular accident (CVA) Multiple  Type 2 diabetes mellitus  CKD (chronic kidney disease), stage II    No significant past surgical history        FAMILY HISTORY:    No pertinent family history of premature cardiovascular disease in first degree relatives.  Mother:  of cancer at 65  Father:  of an overdose at 50    SOCIAL HISTORY:    Smokes at least a cigarette a day, denies alcohol or drug use, lives alone    Allergies    No Known Allergies    Intolerances      PHYSICAL EXAM:    General Appearance: well appearing, normal for age and gender. 	  Neck: normal JVP, no bruit.   Cardiovascular: regular rate and rhythm S1 S2, + JVD, No murmurs, 1+le edema  Respiratory: Lungs clear to auscultation	  Psychiatry: Alert and oriented x 3, Mood & affect appropriate  Gastrointestinal:  Soft, Non-tender  Musculoskeletal/ extremities: Normal range of motion, No clubbing, cyanosis ble edema  Vascular: Peripheral pulses palpable 2+ bilaterally      PREVIOUS DIAGNOSTIC TESTING:      TTE     Summary:   1. Moderately decreased segmental left ventricular systolic function.   2. LV Ejection Fraction by Urena's Method with a biplane EF of 38 %.   3. Spectral Doppler shows pseudonormal pattern of left ventricular myocardial filling (Grade IIdiastolic dysfunction).   4. Mild to moderate mitral valve regurgitation.   5. The mitral valve leaflets are tethered which is due to reduced systolic function and elevated LVDP.   6. Mild-moderate tricuspid regurgitation.   7. Mild aortic regurgitation.   8. Sclerotic aortic valve with normal opening.   9. Estimated pulmonary artery systolic pressure is 59.7 mmHg assuming a right atrial pressure of 15 mmHg, which is consistent with moderate pulmonary hypertension.      Home Medications:  Albuterol (Eqv-ProAir HFA) 90 mcg/inh inhalation aerosol: 2 puff(s) inhaled every 6 hours, As Needed (2021 11:)  aspirin 81 mg oral tablet: 1 tab(s) orally once a day (:)  BuSpar 10 mg oral tablet: 1 tab(s) orally 3 times a day (:)  fenofibrate 145 mg oral tablet: 1 tab(s) orally once a day (:)  glipiZIDE 10 mg oral tablet: 1 tab(s) orally 2 times a day (:22)  Lipitor 40 mg oral tablet: 1 tab(s) orally once a day (:22)  lisinopril 40 mg oral tablet: 1 tab(s) orally once a day (:22)  metFORMIN 1000 mg oral tablet: 1 tab(s) orally 2 times a day (2021 11:22)  Metoprolol Tartrate 50 mg oral tablet: 1 tab(s) orally 2 times a day (:22)  Norvasc 10 mg oral tablet: 1 tab(s) orally once a day (:22)  Plavix 75 mg oral tablet: 1 tab(s) orally once a day (2021 11:)  SEROquel 100 mg oral tablet: 2 tab(s) orally once a day (at bedtime) (:)  Tresiba 100 units/mL subcutaneous solution: 50 unit(s) subcutaneous once a day (2021 11:22)    MEDICATIONS  (STANDING):  aspirin  chewable 81 milliGRAM(s) Oral daily  atorvastatin 80 milliGRAM(s) Oral at bedtime  budesonide 160 MICROgram(s)/formoterol 4.5 MICROgram(s) Inhaler 2 Puff(s) Inhalation two times a day  busPIRone 10 milliGRAM(s) Oral three times a day  chlorhexidine 4% Liquid 1 Application(s) Topical <User Schedule>  clopidogrel Tablet 75 milliGRAM(s) Oral daily  dextrose 40% Gel 15 Gram(s) Oral once  dextrose 5%. 1000 milliLiter(s) (50 mL/Hr) IV Continuous <Continuous>  dextrose 5%. 1000 milliLiter(s) (100 mL/Hr) IV Continuous <Continuous>  dextrose 50% Injectable 25 Gram(s) IV Push once  dextrose 50% Injectable 12.5 Gram(s) IV Push once  dextrose 50% Injectable 25 Gram(s) IV Push once  enoxaparin Injectable 40 milliGRAM(s) SubCutaneous daily  furosemide   Injectable 40 milliGRAM(s) IV Push every 12 hours  glucagon  Injectable 1 milliGRAM(s) IntraMuscular once  insulin glargine Injectable (LANTUS) 12 Unit(s) SubCutaneous at bedtime  insulin lispro (ADMELOG) corrective regimen sliding scale   SubCutaneous three times a day before meals  insulin lispro Injectable (ADMELOG) 4 Unit(s) SubCutaneous three times a day before meals  lisinopril 40 milliGRAM(s) Oral daily  metoprolol succinate  milliGRAM(s) Oral daily  polyethylene glycol 3350 17 Gram(s) Oral two times a day  QUEtiapine 100 milliGRAM(s) Oral at bedtime  risperiDONE   Tablet 1 milliGRAM(s) Oral daily  risperiDONE   Tablet 3 milliGRAM(s) Oral at bedtime  senna 2 Tablet(s) Oral at bedtime    MEDICATIONS  (PRN):  bisacodyl Suppository 10 milliGRAM(s) Rectal daily PRN Constipation         Interval History:    - Patient resting in bed, in NAD  - Patient was due for her 2nd dose of her COVID (Moderna) vaccine on          HISTORY OF PRESENT ILLNESS:     56 year old female with a pmhx of chronic hypoxic respiratory failure (on 4L home O2) secondary to  HFrEF 38% (last exacerbation in ) , HTN, HLD, DM2, active smoker , CVA with residual LE weakness (, wheelchair bound),  COPD , mood /depression,  presents of worsening shortness of breath and increase weight with  B/L LE x 2weeks. Patient mentions that she hasn't been taking Lasix for the past two weeks as she ran out of it. Reports she is non compliant with fluid intake. Denies any chest pain, nausea, vomiting, urinary symptoms, fever, chills. Of note patient received 1 st dose of Moderna two weeks, due for 2nd shot on .    Patient reports worsening SOB for the past few weeks, now feeling better after diuresis. Patient endorses non-compliance with low sodium diet and was not aware she did not receive her Lasix on her medication package from the pharmacy. Patient denies c/p, palpitations, headaches, bleeding, LH, dizziness, orthopnea, PND, LOC, cough, feeling bloated, N/V/D.        PAST MEDICAL & SURGICAL HISTORY:    Hypertension  Hyperlipidemia  Anxiety and depression  COPD, severe  CHF (congestive heart failure)  Cerebrovascular accident (CVA) Multiple  Type 2 diabetes mellitus  CKD (chronic kidney disease), stage II    No significant past surgical history        FAMILY HISTORY:    No pertinent family history of premature cardiovascular disease in first degree relatives.  Mother:  of cancer at 65  Father:  of an overdose at 50    SOCIAL HISTORY:    Smokes at least a cigarette a day, denies alcohol or drug use, lives alone    Allergies    No Known Allergies    Intolerances      PHYSICAL EXAM:    General Appearance: well appearing, normal for age and gender. 	  Neck: normal JVP, no bruit.   Cardiovascular: regular rate and rhythm S1 S2, + JVD, No murmurs, 1+le edema  Respiratory: Lungs clear to auscultation	  Psychiatry: Alert and oriented x 3, Mood & affect appropriate  Gastrointestinal:  Soft, Non-tender  Musculoskeletal/ extremities: Normal range of motion, No clubbing, cyanosis ble edema  Vascular: Peripheral pulses palpable 2+ bilaterally      PREVIOUS DIAGNOSTIC TESTING:      TTE     Summary:   1. Moderately decreased segmental left ventricular systolic function.   2. LV Ejection Fraction by Urena's Method with a biplane EF of 38 %.   3. Spectral Doppler shows pseudonormal pattern of left ventricular myocardial filling (Grade IIdiastolic dysfunction).   4. Mild to moderate mitral valve regurgitation.   5. The mitral valve leaflets are tethered which is due to reduced systolic function and elevated LVDP.   6. Mild-moderate tricuspid regurgitation.   7. Mild aortic regurgitation.   8. Sclerotic aortic valve with normal opening.   9. Estimated pulmonary artery systolic pressure is 59.7 mmHg assuming a right atrial pressure of 15 mmHg, which is consistent with moderate pulmonary hypertension.      Home Medications:  Albuterol (Eqv-ProAir HFA) 90 mcg/inh inhalation aerosol: 2 puff(s) inhaled every 6 hours, As Needed (2021 11:)  aspirin 81 mg oral tablet: 1 tab(s) orally once a day (:)  BuSpar 10 mg oral tablet: 1 tab(s) orally 3 times a day (:)  fenofibrate 145 mg oral tablet: 1 tab(s) orally once a day (:)  glipiZIDE 10 mg oral tablet: 1 tab(s) orally 2 times a day (:)  Lipitor 40 mg oral tablet: 1 tab(s) orally once a day (:)  lisinopril 40 mg oral tablet: 1 tab(s) orally once a day (:22)  metFORMIN 1000 mg oral tablet: 1 tab(s) orally 2 times a day (:)  Metoprolol Tartrate 50 mg oral tablet: 1 tab(s) orally 2 times a day (:)  Norvasc 10 mg oral tablet: 1 tab(s) orally once a day (:)  Plavix 75 mg oral tablet: 1 tab(s) orally once a day (:)  SEROquel 100 mg oral tablet: 2 tab(s) orally once a day (at bedtime) (2021 11:22)  Tresiba 100 units/mL subcutaneous solution: 50 unit(s) subcutaneous once a day (2021 11:)    MEDICATIONS  (STANDING):  aspirin  chewable 81 milliGRAM(s) Oral daily  atorvastatin 80 milliGRAM(s) Oral at bedtime  budesonide 160 MICROgram(s)/formoterol 4.5 MICROgram(s) Inhaler 2 Puff(s) Inhalation two times a day  busPIRone 10 milliGRAM(s) Oral three times a day  chlorhexidine 4% Liquid 1 Application(s) Topical <User Schedule>  clopidogrel Tablet 75 milliGRAM(s) Oral daily  dextrose 40% Gel 15 Gram(s) Oral once  dextrose 5%. 1000 milliLiter(s) (50 mL/Hr) IV Continuous <Continuous>  dextrose 5%. 1000 milliLiter(s) (100 mL/Hr) IV Continuous <Continuous>  dextrose 50% Injectable 25 Gram(s) IV Push once  dextrose 50% Injectable 12.5 Gram(s) IV Push once  dextrose 50% Injectable 25 Gram(s) IV Push once  enoxaparin Injectable 40 milliGRAM(s) SubCutaneous daily  furosemide   Injectable 40 milliGRAM(s) IV Push every 12 hours  glucagon  Injectable 1 milliGRAM(s) IntraMuscular once  insulin glargine Injectable (LANTUS) 12 Unit(s) SubCutaneous at bedtime  insulin lispro (ADMELOG) corrective regimen sliding scale   SubCutaneous three times a day before meals  insulin lispro Injectable (ADMELOG) 4 Unit(s) SubCutaneous three times a day before meals  lisinopril 40 milliGRAM(s) Oral daily  metoprolol succinate  milliGRAM(s) Oral daily  polyethylene glycol 3350 17 Gram(s) Oral two times a day  QUEtiapine 100 milliGRAM(s) Oral at bedtime  risperiDONE   Tablet 1 milliGRAM(s) Oral daily  risperiDONE   Tablet 3 milliGRAM(s) Oral at bedtime  senna 2 Tablet(s) Oral at bedtime    MEDICATIONS  (PRN):  bisacodyl Suppository 10 milliGRAM(s) Rectal daily PRN Constipation

## 2021-06-14 NOTE — CHART NOTE - NSCHARTNOTEFT_GEN_A_CORE
Cardiology fellow at bedside, recommended to push Adenosine 6 mg given that pt has been persistently tachycardic for over 12 hrs. Pt was placed on bedside monitor, Adenosine 6 mg pushed with no improvement of HR. Pt endorsed some chest tightness immediately after administration of medication, which resolved. Will inc Cardizem drip to 10 mg/hr to achieve better rate control.

## 2021-06-14 NOTE — DISCHARGE NOTE PROVIDER - CARE PROVIDERS DIRECT ADDRESSES
,[email or web address],[email or web address]

## 2021-06-15 LAB
ALBUMIN SERPL ELPH-MCNC: 3.3 G/DL — LOW (ref 3.5–5.2)
ALP SERPL-CCNC: 95 U/L — SIGNIFICANT CHANGE UP (ref 30–115)
ALT FLD-CCNC: 12 U/L — SIGNIFICANT CHANGE UP (ref 0–41)
ANION GAP SERPL CALC-SCNC: 9 MMOL/L — SIGNIFICANT CHANGE UP (ref 7–14)
AST SERPL-CCNC: 20 U/L — SIGNIFICANT CHANGE UP (ref 0–41)
BASOPHILS # BLD AUTO: 0.05 K/UL — SIGNIFICANT CHANGE UP (ref 0–0.2)
BASOPHILS NFR BLD AUTO: 1 % — SIGNIFICANT CHANGE UP (ref 0–1)
BILIRUB SERPL-MCNC: 0.5 MG/DL — SIGNIFICANT CHANGE UP (ref 0.2–1.2)
BUN SERPL-MCNC: 21 MG/DL — HIGH (ref 10–20)
CALCIUM SERPL-MCNC: 8.7 MG/DL — SIGNIFICANT CHANGE UP (ref 8.5–10.1)
CHLORIDE SERPL-SCNC: 99 MMOL/L — SIGNIFICANT CHANGE UP (ref 98–110)
CO2 SERPL-SCNC: 32 MMOL/L — SIGNIFICANT CHANGE UP (ref 17–32)
CREAT SERPL-MCNC: 0.8 MG/DL — SIGNIFICANT CHANGE UP (ref 0.7–1.5)
EOSINOPHIL # BLD AUTO: 0.13 K/UL — SIGNIFICANT CHANGE UP (ref 0–0.7)
EOSINOPHIL NFR BLD AUTO: 2.7 % — SIGNIFICANT CHANGE UP (ref 0–8)
GLUCOSE BLDC GLUCOMTR-MCNC: 147 MG/DL — HIGH (ref 70–99)
GLUCOSE BLDC GLUCOMTR-MCNC: 175 MG/DL — HIGH (ref 70–99)
GLUCOSE BLDC GLUCOMTR-MCNC: 175 MG/DL — HIGH (ref 70–99)
GLUCOSE BLDC GLUCOMTR-MCNC: 197 MG/DL — HIGH (ref 70–99)
GLUCOSE SERPL-MCNC: 149 MG/DL — HIGH (ref 70–99)
HCT VFR BLD CALC: 40.2 % — SIGNIFICANT CHANGE UP (ref 37–47)
HGB BLD-MCNC: 12.2 G/DL — SIGNIFICANT CHANGE UP (ref 12–16)
IMM GRANULOCYTES NFR BLD AUTO: 0.4 % — HIGH (ref 0.1–0.3)
LYMPHOCYTES # BLD AUTO: 1.76 K/UL — SIGNIFICANT CHANGE UP (ref 1.2–3.4)
LYMPHOCYTES # BLD AUTO: 36.4 % — SIGNIFICANT CHANGE UP (ref 20.5–51.1)
MAGNESIUM SERPL-MCNC: 1.9 MG/DL — SIGNIFICANT CHANGE UP (ref 1.8–2.4)
MCHC RBC-ENTMCNC: 27.4 PG — SIGNIFICANT CHANGE UP (ref 27–31)
MCHC RBC-ENTMCNC: 30.3 G/DL — LOW (ref 32–37)
MCV RBC AUTO: 90.3 FL — SIGNIFICANT CHANGE UP (ref 81–99)
MONOCYTES # BLD AUTO: 0.33 K/UL — SIGNIFICANT CHANGE UP (ref 0.1–0.6)
MONOCYTES NFR BLD AUTO: 6.8 % — SIGNIFICANT CHANGE UP (ref 1.7–9.3)
NEUTROPHILS # BLD AUTO: 2.54 K/UL — SIGNIFICANT CHANGE UP (ref 1.4–6.5)
NEUTROPHILS NFR BLD AUTO: 52.7 % — SIGNIFICANT CHANGE UP (ref 42.2–75.2)
NRBC # BLD: 0 /100 WBCS — SIGNIFICANT CHANGE UP (ref 0–0)
PLATELET # BLD AUTO: 264 K/UL — SIGNIFICANT CHANGE UP (ref 130–400)
POTASSIUM SERPL-MCNC: 4.3 MMOL/L — SIGNIFICANT CHANGE UP (ref 3.5–5)
POTASSIUM SERPL-SCNC: 4.3 MMOL/L — SIGNIFICANT CHANGE UP (ref 3.5–5)
PROT SERPL-MCNC: 6 G/DL — SIGNIFICANT CHANGE UP (ref 6–8)
RBC # BLD: 4.45 M/UL — SIGNIFICANT CHANGE UP (ref 4.2–5.4)
RBC # FLD: 17.5 % — HIGH (ref 11.5–14.5)
SARS-COV-2 RNA SPEC QL NAA+PROBE: SIGNIFICANT CHANGE UP
SODIUM SERPL-SCNC: 140 MMOL/L — SIGNIFICANT CHANGE UP (ref 135–146)
TROPONIN T SERPL-MCNC: <0.01 NG/ML — SIGNIFICANT CHANGE UP
TSH SERPL-MCNC: 2.57 UIU/ML — SIGNIFICANT CHANGE UP (ref 0.27–4.2)
WBC # BLD: 4.83 K/UL — SIGNIFICANT CHANGE UP (ref 4.8–10.8)
WBC # FLD AUTO: 4.83 K/UL — SIGNIFICANT CHANGE UP (ref 4.8–10.8)

## 2021-06-15 PROCEDURE — 99233 SBSQ HOSP IP/OBS HIGH 50: CPT

## 2021-06-15 PROCEDURE — 99222 1ST HOSP IP/OBS MODERATE 55: CPT

## 2021-06-15 PROCEDURE — 93010 ELECTROCARDIOGRAM REPORT: CPT

## 2021-06-15 RX ORDER — AMLODIPINE BESYLATE 2.5 MG/1
1 TABLET ORAL
Qty: 0 | Refills: 0 | DISCHARGE

## 2021-06-15 RX ORDER — AMIODARONE HYDROCHLORIDE 400 MG/1
2 TABLET ORAL
Qty: 36 | Refills: 0
Start: 2021-06-15 | End: 2021-06-20

## 2021-06-15 RX ORDER — AMIODARONE HYDROCHLORIDE 400 MG/1
400 TABLET ORAL EVERY 8 HOURS
Refills: 0 | Status: DISCONTINUED | OUTPATIENT
Start: 2021-06-15 | End: 2021-06-17

## 2021-06-15 RX ORDER — REGADENOSON 0.08 MG/ML
0.4 INJECTION, SOLUTION INTRAVENOUS ONCE
Refills: 0 | Status: COMPLETED | OUTPATIENT
Start: 2021-06-15 | End: 2021-06-16

## 2021-06-15 RX ADMIN — CHLORHEXIDINE GLUCONATE 1 APPLICATION(S): 213 SOLUTION TOPICAL at 05:50

## 2021-06-15 RX ADMIN — Medication 1: at 12:07

## 2021-06-15 RX ADMIN — Medication 20 MILLIGRAM(S): at 18:12

## 2021-06-15 RX ADMIN — SENNA PLUS 2 TABLET(S): 8.6 TABLET ORAL at 21:46

## 2021-06-15 RX ADMIN — AMIODARONE HYDROCHLORIDE 400 MILLIGRAM(S): 400 TABLET ORAL at 14:48

## 2021-06-15 RX ADMIN — Medication 4 UNIT(S): at 12:08

## 2021-06-15 RX ADMIN — Medication 20 MILLIGRAM(S): at 05:50

## 2021-06-15 RX ADMIN — Medication 10 MILLIGRAM(S): at 14:47

## 2021-06-15 RX ADMIN — Medication 1: at 08:09

## 2021-06-15 RX ADMIN — LOSARTAN POTASSIUM 100 MILLIGRAM(S): 100 TABLET, FILM COATED ORAL at 05:50

## 2021-06-15 RX ADMIN — ENOXAPARIN SODIUM 40 MILLIGRAM(S): 100 INJECTION SUBCUTANEOUS at 11:48

## 2021-06-15 RX ADMIN — Medication 10 MILLIGRAM(S): at 21:47

## 2021-06-15 RX ADMIN — Medication 4 UNIT(S): at 08:09

## 2021-06-15 RX ADMIN — BUDESONIDE AND FORMOTEROL FUMARATE DIHYDRATE 2 PUFF(S): 160; 4.5 AEROSOL RESPIRATORY (INHALATION) at 08:17

## 2021-06-15 RX ADMIN — MAGNESIUM OXIDE 400 MG ORAL TABLET 400 MILLIGRAM(S): 241.3 TABLET ORAL at 18:12

## 2021-06-15 RX ADMIN — RISPERIDONE 3 MILLIGRAM(S): 4 TABLET ORAL at 21:47

## 2021-06-15 RX ADMIN — AMIODARONE HYDROCHLORIDE 400 MILLIGRAM(S): 400 TABLET ORAL at 21:46

## 2021-06-15 RX ADMIN — MAGNESIUM OXIDE 400 MG ORAL TABLET 400 MILLIGRAM(S): 241.3 TABLET ORAL at 11:48

## 2021-06-15 RX ADMIN — Medication 4 UNIT(S): at 18:12

## 2021-06-15 RX ADMIN — INSULIN GLARGINE 12 UNIT(S): 100 INJECTION, SOLUTION SUBCUTANEOUS at 21:45

## 2021-06-15 RX ADMIN — ATORVASTATIN CALCIUM 80 MILLIGRAM(S): 80 TABLET, FILM COATED ORAL at 21:46

## 2021-06-15 RX ADMIN — CLOPIDOGREL BISULFATE 75 MILLIGRAM(S): 75 TABLET, FILM COATED ORAL at 11:48

## 2021-06-15 RX ADMIN — MAGNESIUM OXIDE 400 MG ORAL TABLET 400 MILLIGRAM(S): 241.3 TABLET ORAL at 08:08

## 2021-06-15 RX ADMIN — QUETIAPINE FUMARATE 100 MILLIGRAM(S): 200 TABLET, FILM COATED ORAL at 21:46

## 2021-06-15 RX ADMIN — Medication 200 MILLIGRAM(S): at 05:50

## 2021-06-15 RX ADMIN — RISPERIDONE 1 MILLIGRAM(S): 4 TABLET ORAL at 11:48

## 2021-06-15 RX ADMIN — Medication 10 MILLIGRAM(S): at 05:50

## 2021-06-15 RX ADMIN — Medication 81 MILLIGRAM(S): at 11:48

## 2021-06-15 NOTE — PROGRESS NOTE ADULT - SUBJECTIVE AND OBJECTIVE BOX
WILLIAN MARY 56y Female  MRN#: [id number]   CODE STATUS:FULL      SUBJECTIVE  Patient is a 56y old Female who presents with a chief complaint of CHF exacerbation (14 Jun 2021 23:44)  Currently admitted to medicine with the primary diagnosis of CHF exacerbation    Patient examined at bedside. Converted to NSR. Wants to go home. Pending lexiscan.     OBJECTIVE  PAST MEDICAL & SURGICAL HISTORY  Hypertension    Hyperlipidemia    Anxiety and depression    COPD, severe    CHF (congestive heart failure)    Cerebrovascular accident (CVA)  Multiple    Type 2 diabetes mellitus    CKD (chronic kidney disease), stage II    No significant past surgical history      ALLERGIES:  No Known Allergies    MEDICATIONS:  STANDING MEDICATIONS  albuterol/ipratropium for Nebulization. 3 milliLiter(s) Nebulizer once  aMIOdarone    Tablet 400 milliGRAM(s) Oral every 8 hours  aspirin  chewable 81 milliGRAM(s) Oral daily  atorvastatin 80 milliGRAM(s) Oral at bedtime  budesonide 160 MICROgram(s)/formoterol 4.5 MICROgram(s) Inhaler 2 Puff(s) Inhalation two times a day  busPIRone 10 milliGRAM(s) Oral three times a day  chlorhexidine 4% Liquid 1 Application(s) Topical <User Schedule>  clopidogrel Tablet 75 milliGRAM(s) Oral daily  dextrose 40% Gel 15 Gram(s) Oral once  dextrose 5%. 1000 milliLiter(s) IV Continuous <Continuous>  dextrose 5%. 1000 milliLiter(s) IV Continuous <Continuous>  dextrose 50% Injectable 25 Gram(s) IV Push once  dextrose 50% Injectable 12.5 Gram(s) IV Push once  dextrose 50% Injectable 25 Gram(s) IV Push once  diltiazem Infusion 7.5 mG/Hr IV Continuous <Continuous>  enoxaparin Injectable 40 milliGRAM(s) SubCutaneous daily  glucagon  Injectable 1 milliGRAM(s) IntraMuscular once  insulin glargine Injectable (LANTUS) 12 Unit(s) SubCutaneous at bedtime  insulin lispro (ADMELOG) corrective regimen sliding scale   SubCutaneous three times a day before meals  insulin lispro Injectable (ADMELOG) 4 Unit(s) SubCutaneous three times a day before meals  losartan 100 milliGRAM(s) Oral daily  magnesium oxide 400 milliGRAM(s) Oral three times a day with meals  metoprolol succinate  milliGRAM(s) Oral daily  polyethylene glycol 3350 17 Gram(s) Oral two times a day  QUEtiapine 100 milliGRAM(s) Oral at bedtime  regadenoson Injectable 0.4 milliGRAM(s) IV Push once  risperiDONE   Tablet 1 milliGRAM(s) Oral daily  risperiDONE   Tablet 3 milliGRAM(s) Oral at bedtime  senna 2 Tablet(s) Oral at bedtime  torsemide 20 milliGRAM(s) Oral two times a day    PRN MEDICATIONS  bisacodyl Suppository 10 milliGRAM(s) Rectal daily PRN      VITAL SIGNS: Last 24 Hours  T(C): 36.1 (15 Davin 2021 04:00), Max: 36.9 (14 Jun 2021 21:00)  T(F): 97 (15 Davin 2021 04:00), Max: 98.5 (14 Jun 2021 21:00)  HR: 66 (15 Davin 2021 10:19) (64 - 118)  BP: 121/76 (15 Davin 2021 10:19) (98/68 - 155/91)  BP(mean): --  RR: 18 (15 Davin 2021 04:00) (16 - 18)  SpO2: 97% (14 Jun 2021 19:50) (97% - 97%)    LABS:                        12.2   4.83  )-----------( 264      ( 15 Davin 2021 06:15 )             40.2     06-15    140  |  99  |  21<H>  ----------------------------<  149<H>  4.3   |  32  |  0.8    Ca    8.7      15 Davin 2021 06:15  Mg     1.9     06-15    TPro  6.0  /  Alb  3.3<L>  /  TBili  0.5  /  DBili  x   /  AST  20  /  ALT  12  /  AlkPhos  95  06-15          Troponin T, Serum: <0.01 ng/mL (06-15-21 @ 06:15)      CARDIAC MARKERS ( 15 Davin 2021 06:15 )  x     / <0.01 ng/mL / x     / x     / x            PHYSICAL EXAM:    GENERAL: NAD, well-developed, AAOx3  HEENT:  Atraumatic, Normocephalic. EOMI, PERRLA, conjunctiva and sclera clear, No JVD  PULMONARY: Vesicular breath sounds  CARDIOVASCULAR: Regular rate and rhythm; No murmurs, rubs, or gallops  GASTROINTESTINAL: Soft, Nontender, Nondistended; Bowel sounds present  MUSCULOSKELETAL:  Mild swelling lower extremities, non-pitting  NEUROLOGY: non-focal  SKIN: No rashes or lesions      ASSESSMENT & PLAN    Patient is a 56 year old woman with PMH chronic hypoxic respiratory failure (on 4L home O2) secondary to  HFrEF 38%, HTN, HLD, DM2, CORNELIUS on CPAP, CVA with residual LE weakness (2014, wheelchair bound),  COPD, depression who presents of worsening shortness of breath and increase weight with  B/L LE x 2weeks. Admitted for acute on chronic CHF exacerbation.    1) Acute on chronic CHF exacerbation with resolved acute on chronic hypoxic respiratory failure  - Currently on 4L NC (on 4L at home)  - BNP 6/6: 7726  - CXR 6/6: cardiomegaly and bilateral interstitial opacities; repeat CXR 6/15 improving   - Switch IV lasix 40mg q12h to torsemide 20mg BID  - Daily weights/ strict I+O/ fluid restrict  - Continue losartan 100mg q24hrs, metoprolol succinate 200mg q24hrs  - Heart failure consult appreciated   - Echo 6/2021: EF 35-40%, grade III diastolic dysfunction  - Cardiology consult appreciated  - For stress test 6/15    2) Narrow complex tachycardia  -Seems to be A.flutter  -On metoprolol 200mg q24hrs  -EP consult appreciated  -Started diltiazem 6/14; currently in sinus  -Will give oral amiodarone loading 400mg q8hrs for 6 days  -On clopidogrel and ASA; may need full AC    3) DM II  - Lantus 12 qhs, lispro 4/4/4  - 6/7/21 A1c 6.5    4) HLD  - Continue atorvastatin 80mg qhs  - Lipid panel: LDL 42, HDL 29, triglycerides 183     5) COPD/pulmonary HTN  - Continue inhalers    6) CORNELIUS/OHS ? on CPAP  - CPAP at night  - Outpatient pulm follow-up    7) CVA with residual LE weakness  - c/w ASA/plavix/statin    8) Depression/ mood disorder  - Continue risperidone 3mg qhs, quetiapine 100mg qhs, buspirone 10mg TID    MISC:  - DVT ppx: lovenox  - PPI ppx: NA  - DIET: DASH carb consistent, fluid restriction  - Code Status: Full Code

## 2021-06-15 NOTE — PROGRESS NOTE ADULT - ATTENDING COMMENTS
- Atrial tachycardia- now in sinus  - DC cardizem due to concern with low EF  - Continue Amiodarone  - Diuresis as per HF

## 2021-06-15 NOTE — PROGRESS NOTE ADULT - ASSESSMENT
Acute on chronic systolic HF /fluid overload     Patient remains fluid overloaded on exam IVC 2.6 CM  Get BMP daily   Switch to Torsemide 20 mg BID starting this afternoon  Continue metoprolol  mg daily   Get 12 lead ECG for tachycardia -110s bpm  Continue Losartan 100 mg daily   Maintain potassium >4.0, Mg >2.1  Strict intake and output  Ischemic work up needed once euvolemic if not done in the past   PT evaluation   Daily weight   Discussed with primary team  Will continue to follow   Acute on chronic systolic HF /fluid overload     Patient remains fluid overloaded on exam   Continue torsemide 20 mg BID - upon discharge, continue with BID for 10 days then 20 mg daily and second 20 mg only as needed for weight gain of 2 lb in one day.  Continue metoprolol  mg daily   Get 12 lead ECG for tachycardia -110s bpm  Continue Losartan 100 mg daily - Switch to Entresto 24-26 mg BID if patient stays today  Maintain potassium >4.0, Mg >2.1  Strict intake and output  Ischemic work up recommended but patient refuses stress test or angiogram. She understands the risks of not identifying and treating a significant blockage in her coronary arteries. She adamantly requests to be discharged.   ECG shows LBBB, if LVEF does not improve with optimal medical therapy, she would benefit from CRT  PT evaluation   Daily weight   Discussed with primary team and Dr. Combs

## 2021-06-15 NOTE — PROGRESS NOTE ADULT - ASSESSMENT
Assessment: 56 year old woman with PMH chronic hypoxic respiratory failure (on 4L home O2) secondary to  HFrEF 38%, HTN, HLD, DM2, CORNELIUS on CPAP, CVA with residual LE weakness (2014, wheelchair bound),  COPD, depression who presents of worsening shortness of breath and increase weight with  B/L LE x 2weeks. Admitted for acute on chronic CHF exacerbation.  Pt was in SVT on admission but is now in NSR.  She is on Cardizem gtt at 7.5 mg /hr    Impression:  HF Exacerbation  Atrial Tachycardia, likely AVNRT  COPD on 4L Home O2  HTN  HLD  DM  CORNELIUS on CPAP  Hx CVA    Plan:  - Per Dr. Soto, ok for Amiodarone.  D/c cardizme gtt  - 2 d echo noted  - diurese per HF  - pt is waiting for a stress test  - Pt can follow up with Dr. Soto as an outpt  - please reconsult EP as needed

## 2021-06-15 NOTE — PROGRESS NOTE ADULT - SUBJECTIVE AND OBJECTIVE BOX
Interval History:    -       HISTORY OF PRESENT ILLNESS:     56 year old female with a pmhx of chronic hypoxic respiratory failure (on 4L home O2) secondary to  HFrEF 38% (last exacerbation in ) , HTN, HLD, DM2, active smoker , CVA with residual LE weakness (, wheelchair bound),  COPD , mood /depression,  presents of worsening shortness of breath and increase weight with  B/L LE x 2weeks. Patient mentions that she hasn't been taking Lasix for the past two weeks as she ran out of it. Reports she is non compliant with fluid intake. Denies any chest pain, nausea, vomiting, urinary symptoms, fever, chills. Of note patient received 1 st dose of Moderna two weeks, due for 2nd shot on .    Patient reports worsening SOB for the past few weeks, now feeling better after diuresis. Patient endorses non-compliance with low sodium diet and was not aware she did not receive her Lasix on her medication package from the pharmacy. Patient denies c/p, palpitations, headaches, bleeding, LH, dizziness, orthopnea, PND, LOC, cough, feeling bloated, N/V/D.        PAST MEDICAL & SURGICAL HISTORY:    Hypertension  Hyperlipidemia  Anxiety and depression  COPD, severe  CHF (congestive heart failure)  Cerebrovascular accident (CVA) Multiple  Type 2 diabetes mellitus  CKD (chronic kidney disease), stage II    No significant past surgical history        FAMILY HISTORY:    No pertinent family history of premature cardiovascular disease in first degree relatives.  Mother:  of cancer at 65  Father:  of an overdose at 50    SOCIAL HISTORY:    Smokes at least a cigarette a day, denies alcohol or drug use, lives alone    Allergies    No Known Allergies    Intolerances      PHYSICAL EXAM:    General Appearance: well appearing, normal for age and gender. 	  Neck: normal JVP, no bruit.   Cardiovascular: regular rate and rhythm S1 S2, + JVD, No murmurs, 1+le edema  Respiratory: Lungs clear to auscultation	  Psychiatry: Alert and oriented x 3, Mood & affect appropriate  Gastrointestinal:  Soft, Non-tender  Musculoskeletal/ extremities: Normal range of motion, No clubbing, cyanosis ble edema  Vascular: Peripheral pulses palpable 2+ bilaterally      PREVIOUS DIAGNOSTIC TESTING:      TTE     Summary:   1. Moderately decreased segmental left ventricular systolic function.   2. LV Ejection Fraction by Urena's Method with a biplane EF of 38 %.   3. Spectral Doppler shows pseudonormal pattern of left ventricular myocardial filling (Grade IIdiastolic dysfunction).   4. Mild to moderate mitral valve regurgitation.   5. The mitral valve leaflets are tethered which is due to reduced systolic function and elevated LVDP.   6. Mild-moderate tricuspid regurgitation.   7. Mild aortic regurgitation.   8. Sclerotic aortic valve with normal opening.   9. Estimated pulmonary artery systolic pressure is 59.7 mmHg assuming a right atrial pressure of 15 mmHg, which is consistent with moderate pulmonary hypertension.      Home Medications:  Albuterol (Eqv-ProAir HFA) 90 mcg/inh inhalation aerosol: 2 puff(s) inhaled every 6 hours, As Needed (2021 11:22)  aspirin 81 mg oral tablet: 1 tab(s) orally once a day (:)  BuSpar 10 mg oral tablet: 1 tab(s) orally 3 times a day (:)  fenofibrate 145 mg oral tablet: 1 tab(s) orally once a day (:22)  glipiZIDE 10 mg oral tablet: 1 tab(s) orally 2 times a day (:22)  Lipitor 40 mg oral tablet: 1 tab(s) orally once a day (:22)  lisinopril 40 mg oral tablet: 1 tab(s) orally once a day (:22)  metFORMIN 1000 mg oral tablet: 1 tab(s) orally 2 times a day (2021 11:22)  Metoprolol Tartrate 50 mg oral tablet: 1 tab(s) orally 2 times a day (:22)  Norvasc 10 mg oral tablet: 1 tab(s) orally once a day (:22)  Plavix 75 mg oral tablet: 1 tab(s) orally once a day (2021 11:22)  SEROquel 100 mg oral tablet: 2 tab(s) orally once a day (at bedtime) (:22)  Tresiba 100 units/mL subcutaneous solution: 50 unit(s) subcutaneous once a day (2021 11:22)    MEDICATIONS  (STANDING):  aspirin  chewable 81 milliGRAM(s) Oral daily  atorvastatin 80 milliGRAM(s) Oral at bedtime  budesonide 160 MICROgram(s)/formoterol 4.5 MICROgram(s) Inhaler 2 Puff(s) Inhalation two times a day  busPIRone 10 milliGRAM(s) Oral three times a day  chlorhexidine 4% Liquid 1 Application(s) Topical <User Schedule>  clopidogrel Tablet 75 milliGRAM(s) Oral daily  dextrose 40% Gel 15 Gram(s) Oral once  dextrose 5%. 1000 milliLiter(s) (50 mL/Hr) IV Continuous <Continuous>  dextrose 5%. 1000 milliLiter(s) (100 mL/Hr) IV Continuous <Continuous>  dextrose 50% Injectable 25 Gram(s) IV Push once  dextrose 50% Injectable 12.5 Gram(s) IV Push once  dextrose 50% Injectable 25 Gram(s) IV Push once  enoxaparin Injectable 40 milliGRAM(s) SubCutaneous daily  furosemide   Injectable 40 milliGRAM(s) IV Push every 12 hours  glucagon  Injectable 1 milliGRAM(s) IntraMuscular once  insulin glargine Injectable (LANTUS) 12 Unit(s) SubCutaneous at bedtime  insulin lispro (ADMELOG) corrective regimen sliding scale   SubCutaneous three times a day before meals  insulin lispro Injectable (ADMELOG) 4 Unit(s) SubCutaneous three times a day before meals  lisinopril 40 milliGRAM(s) Oral daily  metoprolol succinate  milliGRAM(s) Oral daily  polyethylene glycol 3350 17 Gram(s) Oral two times a day  QUEtiapine 100 milliGRAM(s) Oral at bedtime  risperiDONE   Tablet 1 milliGRAM(s) Oral daily  risperiDONE   Tablet 3 milliGRAM(s) Oral at bedtime  senna 2 Tablet(s) Oral at bedtime    MEDICATIONS  (PRN):  bisacodyl Suppository 10 milliGRAM(s) Rectal daily PRN Constipation         Interval History:    - Responding well to oral diuretics, patient remains fluid overloaded. Team had planned for NST today but patient refuses      HISTORY OF PRESENT ILLNESS:     56 year old female with a pmhx of chronic hypoxic respiratory failure (on 4L home O2) secondary to  HFrEF 38% (last exacerbation in ) , HTN, HLD, DM2, active smoker , CVA with residual LE weakness (, wheelchair bound),  COPD , mood /depression,  presents of worsening shortness of breath and increase weight with  B/L LE x 2weeks. Patient mentions that she hasn't been taking Lasix for the past two weeks as she ran out of it. Reports she is non compliant with fluid intake. Denies any chest pain, nausea, vomiting, urinary symptoms, fever, chills. Of note patient received 1 st dose of Moderna two weeks, due for 2nd shot on .    Patient reports worsening SOB for the past few weeks, now feeling better after diuresis. Patient endorses non-compliance with low sodium diet and was not aware she did not receive her Lasix on her medication package from the pharmacy. Patient denies c/p, palpitations, headaches, bleeding, LH, dizziness, orthopnea, PND, LOC, cough, feeling bloated, N/V/D.        PAST MEDICAL & SURGICAL HISTORY:    Hypertension  Hyperlipidemia  Anxiety and depression  COPD, severe  CHF (congestive heart failure)  Cerebrovascular accident (CVA) Multiple  Type 2 diabetes mellitus  CKD (chronic kidney disease), stage II    No significant past surgical history        FAMILY HISTORY:    No pertinent family history of premature cardiovascular disease in first degree relatives.  Mother:  of cancer at 65  Father:  of an overdose at 50    SOCIAL HISTORY:    Smokes at least a cigarette a day, denies alcohol or drug use, lives alone    Allergies    No Known Allergies    Intolerances      PHYSICAL EXAM:    General Appearance: well appearing, normal for age and gender. 	  Neck: normal JVP, no bruit.   Cardiovascular: regular rate and rhythm S1 S2, + JVD, No murmurs, 1+le edema  Respiratory: Lungs clear to auscultation	  Psychiatry: Alert and oriented x 3, Mood & affect appropriate  Gastrointestinal:  Soft, Non-tender  Musculoskeletal/ extremities: Normal range of motion, No clubbing, cyanosis ble edema  Vascular: Peripheral pulses palpable 2+ bilaterally      PREVIOUS DIAGNOSTIC TESTING:      TTE     Summary:   1. Moderately decreased segmental left ventricular systolic function.   2. LV Ejection Fraction by Urena's Method with a biplane EF of 38 %.   3. Spectral Doppler shows pseudonormal pattern of left ventricular myocardial filling (Grade IIdiastolic dysfunction).   4. Mild to moderate mitral valve regurgitation.   5. The mitral valve leaflets are tethered which is due to reduced systolic function and elevated LVDP.   6. Mild-moderate tricuspid regurgitation.   7. Mild aortic regurgitation.   8. Sclerotic aortic valve with normal opening.   9. Estimated pulmonary artery systolic pressure is 59.7 mmHg assuming a right atrial pressure of 15 mmHg, which is consistent with moderate pulmonary hypertension.      Home Medications:  Albuterol (Eqv-ProAir HFA) 90 mcg/inh inhalation aerosol: 2 puff(s) inhaled every 6 hours, As Needed (:)  aspirin 81 mg oral tablet: 1 tab(s) orally once a day (:)  BuSpar 10 mg oral tablet: 1 tab(s) orally 3 times a day (:)  fenofibrate 145 mg oral tablet: 1 tab(s) orally once a day (:)  glipiZIDE 10 mg oral tablet: 1 tab(s) orally 2 times a day (:)  Lipitor 40 mg oral tablet: 1 tab(s) orally once a day (:)  lisinopril 40 mg oral tablet: 1 tab(s) orally once a day (:)  metFORMIN 1000 mg oral tablet: 1 tab(s) orally 2 times a day (:)  Metoprolol Tartrate 50 mg oral tablet: 1 tab(s) orally 2 times a day (:)  Norvasc 10 mg oral tablet: 1 tab(s) orally once a day (:)  Plavix 75 mg oral tablet: 1 tab(s) orally once a day (:)  SEROquel 100 mg oral tablet: 2 tab(s) orally once a day (at bedtime) (:)  Tresiba 100 units/mL subcutaneous solution: 50 unit(s) subcutaneous once a day (:)    MEDICATIONS  (STANDING):  aspirin  chewable 81 milliGRAM(s) Oral daily  atorvastatin 80 milliGRAM(s) Oral at bedtime  budesonide 160 MICROgram(s)/formoterol 4.5 MICROgram(s) Inhaler 2 Puff(s) Inhalation two times a day  busPIRone 10 milliGRAM(s) Oral three times a day  chlorhexidine 4% Liquid 1 Application(s) Topical <User Schedule>  clopidogrel Tablet 75 milliGRAM(s) Oral daily  dextrose 40% Gel 15 Gram(s) Oral once  dextrose 5%. 1000 milliLiter(s) (50 mL/Hr) IV Continuous <Continuous>  dextrose 5%. 1000 milliLiter(s) (100 mL/Hr) IV Continuous <Continuous>  dextrose 50% Injectable 25 Gram(s) IV Push once  dextrose 50% Injectable 12.5 Gram(s) IV Push once  dextrose 50% Injectable 25 Gram(s) IV Push once  enoxaparin Injectable 40 milliGRAM(s) SubCutaneous daily  furosemide   Injectable 40 milliGRAM(s) IV Push every 12 hours  glucagon  Injectable 1 milliGRAM(s) IntraMuscular once  insulin glargine Injectable (LANTUS) 12 Unit(s) SubCutaneous at bedtime  insulin lispro (ADMELOG) corrective regimen sliding scale   SubCutaneous three times a day before meals  insulin lispro Injectable (ADMELOG) 4 Unit(s) SubCutaneous three times a day before meals  lisinopril 40 milliGRAM(s) Oral daily  metoprolol succinate  milliGRAM(s) Oral daily  polyethylene glycol 3350 17 Gram(s) Oral two times a day  QUEtiapine 100 milliGRAM(s) Oral at bedtime  risperiDONE   Tablet 1 milliGRAM(s) Oral daily  risperiDONE   Tablet 3 milliGRAM(s) Oral at bedtime  senna 2 Tablet(s) Oral at bedtime    MEDICATIONS  (PRN):  bisacodyl Suppository 10 milliGRAM(s) Rectal daily PRN Constipation

## 2021-06-15 NOTE — PROGRESS NOTE ADULT - SUBJECTIVE AND OBJECTIVE BOX
INTERVAL HPI/OVERNIGHT EVENTS:  No new events.  Pt is in NSR on tele    MEDICATIONS  (STANDING):  albuterol/ipratropium for Nebulization. 3 milliLiter(s) Nebulizer once  aMIOdarone    Tablet 400 milliGRAM(s) Oral every 8 hours  aspirin  chewable 81 milliGRAM(s) Oral daily  atorvastatin 80 milliGRAM(s) Oral at bedtime  budesonide 160 MICROgram(s)/formoterol 4.5 MICROgram(s) Inhaler 2 Puff(s) Inhalation two times a day  busPIRone 10 milliGRAM(s) Oral three times a day  chlorhexidine 4% Liquid 1 Application(s) Topical <User Schedule>  clopidogrel Tablet 75 milliGRAM(s) Oral daily  dextrose 40% Gel 15 Gram(s) Oral once  dextrose 5%. 1000 milliLiter(s) (50 mL/Hr) IV Continuous <Continuous>  dextrose 5%. 1000 milliLiter(s) (100 mL/Hr) IV Continuous <Continuous>  dextrose 50% Injectable 25 Gram(s) IV Push once  dextrose 50% Injectable 12.5 Gram(s) IV Push once  dextrose 50% Injectable 25 Gram(s) IV Push once  diltiazem Infusion 7.5 mG/Hr (7.5 mL/Hr) IV Continuous <Continuous>  enoxaparin Injectable 40 milliGRAM(s) SubCutaneous daily  glucagon  Injectable 1 milliGRAM(s) IntraMuscular once  insulin glargine Injectable (LANTUS) 12 Unit(s) SubCutaneous at bedtime  insulin lispro (ADMELOG) corrective regimen sliding scale   SubCutaneous three times a day before meals  insulin lispro Injectable (ADMELOG) 4 Unit(s) SubCutaneous three times a day before meals  losartan 100 milliGRAM(s) Oral daily  magnesium oxide 400 milliGRAM(s) Oral three times a day with meals  metoprolol succinate  milliGRAM(s) Oral daily  polyethylene glycol 3350 17 Gram(s) Oral two times a day  QUEtiapine 100 milliGRAM(s) Oral at bedtime  regadenoson Injectable 0.4 milliGRAM(s) IV Push once  risperiDONE   Tablet 1 milliGRAM(s) Oral daily  risperiDONE   Tablet 3 milliGRAM(s) Oral at bedtime  senna 2 Tablet(s) Oral at bedtime  torsemide 20 milliGRAM(s) Oral two times a day    MEDICATIONS  (PRN):  bisacodyl Suppository 10 milliGRAM(s) Rectal daily PRN Constipation      Allergies    No Known Allergies    Intolerances    REVIEW OF SYSTEMS    12 point ROS is negative    Vital Signs Last 24 Hrs  T(C): 36.2 (15 Davin 2021 12:05), Max: 36.9 (14 Jun 2021 21:00)  T(F): 97.1 (15 Davin 2021 12:05), Max: 98.5 (14 Jun 2021 21:00)  HR: 73 (15 Davin 2021 12:05) (64 - 118)  BP: 112/85 (15 Davin 2021 12:05) (98/68 - 153/93)  BP(mean): --  RR: 16 (15 Davin 2021 13:42) (16 - 18)  SpO2: 97% (14 Jun 2021 19:50) (97% - 97%)    06-14-21 @ 07:01  -  06-15-21 @ 07:00  --------------------------------------------------------  IN: 1370 mL / OUT: 3700 mL / NET: -2330 mL    06-15-21 @ 07:01  -  06-15-21 @ 13:51  --------------------------------------------------------  IN: 240 mL / OUT: 400 mL / NET: -160 mL    Physical Exam    GENERAL: In no apparent distress, well nourished, and hydrated.  HEART: Regular rate and rhythm; No murmurs, rubs, or gallops.  PULMONARY: Clear to auscultation and perfusion.  No rales, wheezing, or rhonchi bilaterally.  ABDOMEN: Soft, Nontender, Nondistended; Bowel sounds present  EXTREMITIES:  2+ Peripheral Pulses, No clubbing, cyanosis, or edema  NEUROLOGICAL: Grossly nonfocal    LABS:                        12.2   4.83  )-----------( 264      ( 15 Davin 2021 06:15 )             40.2     06-15    140  |  99  |  21<H>  ----------------------------<  149<H>  4.3   |  32  |  0.8    Ca    8.7      15 Davin 2021 06:15  Mg     1.9     06-15    TPro  6.0  /  Alb  3.3<L>  /  TBili  0.5  /  DBili  x   /  AST  20  /  ALT  12  /  AlkPhos  95  06-15      RADIOLOGY & ADDITIONAL TESTS:

## 2021-06-15 NOTE — CHART NOTE - NSCHARTNOTEFT_GEN_A_CORE
RD LIMITED NOTE    Patient consuming ~% of meals, no factors affecting appetite at this time. Current diet order Diet,, DASH/TLC: Sodium & Cholesterol Restricted  1200mL Fluid Restriction. ecchymosis noted, generalized edema +1 per RN flow sheets. no gastrointestinal signs/symptoms, LBM: 6/15 per pt. Labs and medications reviewed. Reports no chewing and swallowing difficulties at this time. Weights are fluctuating between 223-110 lbs, likely weight fluctuations edema noted. Will continue to monitor weight trends. Per internal medicine, Acute on chronic CHF exacerbation with resolved acute on chronic hypoxic respiratory failure. ADD carbohydrate consistent modifier to diet order. No RD interventions at this time, will f/u in 7 days.

## 2021-06-15 NOTE — PROGRESS NOTE ADULT - SUBJECTIVE AND OBJECTIVE BOX
WILLIAN MARY  56y  Female      Patient is a 56y old  Female who presents with a chief complaint of CHF exacerbation (15 Davin 2021 13:51)      INTERVAL HPI/OVERNIGHT EVENTS:  She feels ok, SOB improved, no chest pain, HR improved on telemetry today back to sinus rhythm.   Vital Signs Last 24 Hrs  T(C): 36.2 (15 Davin 2021 12:05), Max: 36.9 (14 Jun 2021 21:00)  T(F): 97.1 (15 Davin 2021 12:05), Max: 98.5 (14 Jun 2021 21:00)  HR: 73 (15 Davin 2021 12:05) (64 - 118)  BP: 112/85 (15 Davin 2021 12:05) (98/68 - 153/93)  BP(mean): --  RR: 16 (15 Davin 2021 13:42) (16 - 18)  SpO2: 97% (14 Jun 2021 19:50) (97% - 97%)      06-14-21 @ 07:01  -  06-15-21 @ 07:00  --------------------------------------------------------  IN: 1370 mL / OUT: 3700 mL / NET: -2330 mL    06-15-21 @ 07:01  -  06-15-21 @ 14:13  --------------------------------------------------------  IN: 240 mL / OUT: 400 mL / NET: -160 mL            Consultant(s) Notes Reviewed:  [x ] YES  [ ] NO          MEDICATIONS  (STANDING):  albuterol/ipratropium for Nebulization. 3 milliLiter(s) Nebulizer once  aMIOdarone    Tablet 400 milliGRAM(s) Oral every 8 hours  aspirin  chewable 81 milliGRAM(s) Oral daily  atorvastatin 80 milliGRAM(s) Oral at bedtime  budesonide 160 MICROgram(s)/formoterol 4.5 MICROgram(s) Inhaler 2 Puff(s) Inhalation two times a day  busPIRone 10 milliGRAM(s) Oral three times a day  chlorhexidine 4% Liquid 1 Application(s) Topical <User Schedule>  clopidogrel Tablet 75 milliGRAM(s) Oral daily  dextrose 40% Gel 15 Gram(s) Oral once  dextrose 5%. 1000 milliLiter(s) (50 mL/Hr) IV Continuous <Continuous>  dextrose 5%. 1000 milliLiter(s) (100 mL/Hr) IV Continuous <Continuous>  dextrose 50% Injectable 25 Gram(s) IV Push once  dextrose 50% Injectable 12.5 Gram(s) IV Push once  dextrose 50% Injectable 25 Gram(s) IV Push once  diltiazem Infusion 7.5 mG/Hr (7.5 mL/Hr) IV Continuous <Continuous>  enoxaparin Injectable 40 milliGRAM(s) SubCutaneous daily  glucagon  Injectable 1 milliGRAM(s) IntraMuscular once  insulin glargine Injectable (LANTUS) 12 Unit(s) SubCutaneous at bedtime  insulin lispro (ADMELOG) corrective regimen sliding scale   SubCutaneous three times a day before meals  insulin lispro Injectable (ADMELOG) 4 Unit(s) SubCutaneous three times a day before meals  losartan 100 milliGRAM(s) Oral daily  magnesium oxide 400 milliGRAM(s) Oral three times a day with meals  metoprolol succinate  milliGRAM(s) Oral daily  polyethylene glycol 3350 17 Gram(s) Oral two times a day  QUEtiapine 100 milliGRAM(s) Oral at bedtime  regadenoson Injectable 0.4 milliGRAM(s) IV Push once  risperiDONE   Tablet 1 milliGRAM(s) Oral daily  risperiDONE   Tablet 3 milliGRAM(s) Oral at bedtime  senna 2 Tablet(s) Oral at bedtime  torsemide 20 milliGRAM(s) Oral two times a day    MEDICATIONS  (PRN):  bisacodyl Suppository 10 milliGRAM(s) Rectal daily PRN Constipation      LABS                          12.2   4.83  )-----------( 264      ( 15 Davin 2021 06:15 )             40.2     06-15    140  |  99  |  21<H>  ----------------------------<  149<H>  4.3   |  32  |  0.8    Ca    8.7      15 Davin 2021 06:15  Mg     1.9     06-15    TPro  6.0  /  Alb  3.3<L>  /  TBili  0.5  /  DBili  x   /  AST  20  /  ALT  12  /  AlkPhos  95  06-15          Lactate Trend    CARDIAC MARKERS ( 15 Davin 2021 06:15 )  x     / <0.01 ng/mL / x     / x     / x          CAPILLARY BLOOD GLUCOSE      POCT Blood Glucose.: 197 mg/dL (15 Davin 2021 11:31)        RADIOLOGY & ADDITIONAL TESTS:    Imaging Personally Reviewed:  [ ] YES  [ ] NO    HEALTH ISSUES - PROBLEM Dx:          PHYSICAL EXAM:  GENERAL: NAD, well-developed.  HEAD:  Atraumatic, Normocephalic.  EYES: EOMI, PERRLA, conjunctiva and sclera clear.  NECK: Supple, JVD  CHEST/LUNG: Bibasilar rales, scattered expiratory wheezing.    HEART: Regular rate and rhythm; S1 S2. Tachycardic.   ABDOMEN: Soft, Nontender, Nondistended; Bowel sounds present.  EXTREMITIES: LE edema 2+,  PSYCH: AAOx3.  NEUROLOGY: non-focal.  SKIN: No rashes or lesions.

## 2021-06-15 NOTE — PROGRESS NOTE ADULT - ASSESSMENT
56 year old female with PMH of HTN, DM 2, HFrEF, COPD on 4L O2, CVA with residual LE weakness and depression came to ED for worsening SOB and LE edema for the last two weeks. She was not taking her Lasix.   In the ED, Chest X-ray shows pulm vascular congestion and cardiomegaly. BNP elevated. Started on IV Lasix. Pt hypoxic to 94% on NC    A/P:   Acute on chronic hypoxic respiratory failure  Acute HFrEF:   Patient with LE edema, SOB , Pro-BNO  CXR showed vascular congestion. Repeated CXR 6/14 showed improved vascular congestion and interstitial edema.   Echo LVEF 38%, grade II diastolic dysfunction, mild to mod MR, mild to mod TR, mild AR, mod Pulm HTN.   s/p Lasix 40mg IV BID, switched to Torsemide 20mg BID today, continue Metoprolol XL 200mg mg Daily and, switch Lisinopril to Losartan 100mg daily.   Cardiology recommended ischemic work up, send Nuclear stress test.   Daily weight, fluid restriction 1.5L /day, low sodium diet.     Atrial Tachycardia.   HR back to Sinus rhythm, avoid Cardizem.   EP request pulmonary clearance to start on Amiodarone, start on Amiodarone. EP follow up.     Chronic Respiratory failure:   COPD  Chronic tobacco abuse:   Continue Symbicort, continue DuoNeb prn.     History of CVA with residual LE weakness  Continue ASA, Lipitor   Magnesium deficiency.    HTN: Continue lisinopril, Lasix and Metoprolol.    DM II: FS is controlled, A1C 6.8, continue Lantus 12 units and Pre-meals Lispro.     Depression: Continue Wellbutrin and Risperidone.    #Progress Note Handoff:  Pending (specify): Nuclear stress, improving HR.   Family discussion:  need to continue Iv diuresis, daily weight. Counseled for smoking cessation.   Disposition: Home possible in 24 hrs with home care.

## 2021-06-16 LAB
ANION GAP SERPL CALC-SCNC: 8 MMOL/L — SIGNIFICANT CHANGE UP (ref 7–14)
BASOPHILS # BLD AUTO: 0.07 K/UL — SIGNIFICANT CHANGE UP (ref 0–0.2)
BASOPHILS NFR BLD AUTO: 1.4 % — HIGH (ref 0–1)
BUN SERPL-MCNC: 22 MG/DL — HIGH (ref 10–20)
CALCIUM SERPL-MCNC: 9.1 MG/DL — SIGNIFICANT CHANGE UP (ref 8.5–10.1)
CHLORIDE SERPL-SCNC: 98 MMOL/L — SIGNIFICANT CHANGE UP (ref 98–110)
CO2 SERPL-SCNC: 33 MMOL/L — HIGH (ref 17–32)
CREAT SERPL-MCNC: 0.8 MG/DL — SIGNIFICANT CHANGE UP (ref 0.7–1.5)
EOSINOPHIL # BLD AUTO: 0.16 K/UL — SIGNIFICANT CHANGE UP (ref 0–0.7)
EOSINOPHIL NFR BLD AUTO: 3.2 % — SIGNIFICANT CHANGE UP (ref 0–8)
GLUCOSE BLDC GLUCOMTR-MCNC: 149 MG/DL — HIGH (ref 70–99)
GLUCOSE BLDC GLUCOMTR-MCNC: 154 MG/DL — HIGH (ref 70–99)
GLUCOSE BLDC GLUCOMTR-MCNC: 163 MG/DL — HIGH (ref 70–99)
GLUCOSE BLDC GLUCOMTR-MCNC: 208 MG/DL — HIGH (ref 70–99)
GLUCOSE SERPL-MCNC: 162 MG/DL — HIGH (ref 70–99)
HCT VFR BLD CALC: 39.6 % — SIGNIFICANT CHANGE UP (ref 37–47)
HGB BLD-MCNC: 12 G/DL — SIGNIFICANT CHANGE UP (ref 12–16)
IMM GRANULOCYTES NFR BLD AUTO: 0.4 % — HIGH (ref 0.1–0.3)
LYMPHOCYTES # BLD AUTO: 1.62 K/UL — SIGNIFICANT CHANGE UP (ref 1.2–3.4)
LYMPHOCYTES # BLD AUTO: 32.1 % — SIGNIFICANT CHANGE UP (ref 20.5–51.1)
MAGNESIUM SERPL-MCNC: 1.8 MG/DL — SIGNIFICANT CHANGE UP (ref 1.8–2.4)
MCHC RBC-ENTMCNC: 27.1 PG — SIGNIFICANT CHANGE UP (ref 27–31)
MCHC RBC-ENTMCNC: 30.3 G/DL — LOW (ref 32–37)
MCV RBC AUTO: 89.6 FL — SIGNIFICANT CHANGE UP (ref 81–99)
MONOCYTES # BLD AUTO: 0.4 K/UL — SIGNIFICANT CHANGE UP (ref 0.1–0.6)
MONOCYTES NFR BLD AUTO: 7.9 % — SIGNIFICANT CHANGE UP (ref 1.7–9.3)
NEUTROPHILS # BLD AUTO: 2.78 K/UL — SIGNIFICANT CHANGE UP (ref 1.4–6.5)
NEUTROPHILS NFR BLD AUTO: 55 % — SIGNIFICANT CHANGE UP (ref 42.2–75.2)
NRBC # BLD: 0 /100 WBCS — SIGNIFICANT CHANGE UP (ref 0–0)
PLATELET # BLD AUTO: 268 K/UL — SIGNIFICANT CHANGE UP (ref 130–400)
POTASSIUM SERPL-MCNC: 3.9 MMOL/L — SIGNIFICANT CHANGE UP (ref 3.5–5)
POTASSIUM SERPL-SCNC: 3.9 MMOL/L — SIGNIFICANT CHANGE UP (ref 3.5–5)
RBC # BLD: 4.42 M/UL — SIGNIFICANT CHANGE UP (ref 4.2–5.4)
RBC # FLD: 17.3 % — HIGH (ref 11.5–14.5)
SODIUM SERPL-SCNC: 139 MMOL/L — SIGNIFICANT CHANGE UP (ref 135–146)
WBC # BLD: 5.05 K/UL — SIGNIFICANT CHANGE UP (ref 4.8–10.8)
WBC # FLD AUTO: 5.05 K/UL — SIGNIFICANT CHANGE UP (ref 4.8–10.8)

## 2021-06-16 PROCEDURE — 99233 SBSQ HOSP IP/OBS HIGH 50: CPT

## 2021-06-16 PROCEDURE — 78452 HT MUSCLE IMAGE SPECT MULT: CPT | Mod: 26

## 2021-06-16 PROCEDURE — 93018 CV STRESS TEST I&R ONLY: CPT

## 2021-06-16 PROCEDURE — 93016 CV STRESS TEST SUPVJ ONLY: CPT

## 2021-06-16 RX ORDER — METOPROLOL TARTRATE 50 MG
50 TABLET ORAL DAILY
Refills: 0 | Status: DISCONTINUED | OUTPATIENT
Start: 2021-06-16 | End: 2021-06-18

## 2021-06-16 RX ORDER — DILTIAZEM HCL 120 MG
5 CAPSULE, EXT RELEASE 24 HR ORAL
Qty: 125 | Refills: 0 | Status: DISCONTINUED | OUTPATIENT
Start: 2021-06-16 | End: 2021-06-17

## 2021-06-16 RX ORDER — SACUBITRIL AND VALSARTAN 24; 26 MG/1; MG/1
1 TABLET, FILM COATED ORAL
Refills: 0 | Status: DISCONTINUED | OUTPATIENT
Start: 2021-06-16 | End: 2021-06-22

## 2021-06-16 RX ADMIN — BUDESONIDE AND FORMOTEROL FUMARATE DIHYDRATE 2 PUFF(S): 160; 4.5 AEROSOL RESPIRATORY (INHALATION) at 08:36

## 2021-06-16 RX ADMIN — REGADENOSON 0.4 MILLIGRAM(S): 0.08 INJECTION, SOLUTION INTRAVENOUS at 11:48

## 2021-06-16 RX ADMIN — Medication 4 UNIT(S): at 13:38

## 2021-06-16 RX ADMIN — SACUBITRIL AND VALSARTAN 1 TABLET(S): 24; 26 TABLET, FILM COATED ORAL at 17:03

## 2021-06-16 RX ADMIN — SENNA PLUS 2 TABLET(S): 8.6 TABLET ORAL at 21:33

## 2021-06-16 RX ADMIN — Medication 10 MILLIGRAM(S): at 22:58

## 2021-06-16 RX ADMIN — CLOPIDOGREL BISULFATE 75 MILLIGRAM(S): 75 TABLET, FILM COATED ORAL at 13:36

## 2021-06-16 RX ADMIN — ENOXAPARIN SODIUM 40 MILLIGRAM(S): 100 INJECTION SUBCUTANEOUS at 13:36

## 2021-06-16 RX ADMIN — AMIODARONE HYDROCHLORIDE 400 MILLIGRAM(S): 400 TABLET ORAL at 13:36

## 2021-06-16 RX ADMIN — Medication 10 MILLIGRAM(S): at 13:35

## 2021-06-16 RX ADMIN — INSULIN GLARGINE 12 UNIT(S): 100 INJECTION, SOLUTION SUBCUTANEOUS at 21:34

## 2021-06-16 RX ADMIN — POLYETHYLENE GLYCOL 3350 17 GRAM(S): 17 POWDER, FOR SOLUTION ORAL at 17:03

## 2021-06-16 RX ADMIN — RISPERIDONE 3 MILLIGRAM(S): 4 TABLET ORAL at 21:34

## 2021-06-16 RX ADMIN — Medication 20 MILLIGRAM(S): at 17:03

## 2021-06-16 RX ADMIN — CHLORHEXIDINE GLUCONATE 1 APPLICATION(S): 213 SOLUTION TOPICAL at 05:25

## 2021-06-16 RX ADMIN — MAGNESIUM OXIDE 400 MG ORAL TABLET 400 MILLIGRAM(S): 241.3 TABLET ORAL at 17:02

## 2021-06-16 RX ADMIN — QUETIAPINE FUMARATE 100 MILLIGRAM(S): 200 TABLET, FILM COATED ORAL at 21:33

## 2021-06-16 RX ADMIN — Medication 5 MG/HR: at 19:00

## 2021-06-16 RX ADMIN — LOSARTAN POTASSIUM 100 MILLIGRAM(S): 100 TABLET, FILM COATED ORAL at 05:22

## 2021-06-16 RX ADMIN — AMIODARONE HYDROCHLORIDE 400 MILLIGRAM(S): 400 TABLET ORAL at 21:34

## 2021-06-16 RX ADMIN — Medication 20 MILLIGRAM(S): at 05:22

## 2021-06-16 RX ADMIN — Medication 1: at 08:36

## 2021-06-16 RX ADMIN — RISPERIDONE 1 MILLIGRAM(S): 4 TABLET ORAL at 13:36

## 2021-06-16 RX ADMIN — Medication 81 MILLIGRAM(S): at 13:37

## 2021-06-16 RX ADMIN — AMIODARONE HYDROCHLORIDE 400 MILLIGRAM(S): 400 TABLET ORAL at 05:22

## 2021-06-16 RX ADMIN — Medication 4 UNIT(S): at 16:54

## 2021-06-16 RX ADMIN — MAGNESIUM OXIDE 400 MG ORAL TABLET 400 MILLIGRAM(S): 241.3 TABLET ORAL at 13:36

## 2021-06-16 RX ADMIN — Medication 10 MILLIGRAM(S): at 05:22

## 2021-06-16 RX ADMIN — Medication 2: at 13:38

## 2021-06-16 RX ADMIN — ATORVASTATIN CALCIUM 80 MILLIGRAM(S): 80 TABLET, FILM COATED ORAL at 21:33

## 2021-06-16 NOTE — PROGRESS NOTE ADULT - SUBJECTIVE AND OBJECTIVE BOX
Patient is a 56y old  Female who presents with a chief complaint of CHF exacerbation (2021 14:16)        - Back in AT: Since 2 am  - Shortness of breath  - Denies palpitations          PAST MEDICAL & SURGICAL HISTORY:  Hypertension    Hyperlipidemia    Anxiety and depression    COPD, severe    CHF (congestive heart failure)    Cerebrovascular accident (CVA)  Multiple    Type 2 diabetes mellitus    CKD (chronic kidney disease), stage II    No significant past surgical history                    PREVIOUS DIAGNOSTIC TESTING:      ECHO  FINDINGS:    STRESS  FINDINGS:    CATHETERIZATION  FINDINGS:    ELECTROPHYSIOLOGY STUDY  FINDINGS:    CAROTID ULTRASOUND:  FINDINGS    VENOUS DUPLEX SCAN:  FINDINGS:    CHEST CT PULMONARY ANGIO with IV Contrast:  FINDINGS:    MEDICATIONS  (STANDING):  albuterol/ipratropium for Nebulization. 3 milliLiter(s) Nebulizer once  aMIOdarone    Tablet 400 milliGRAM(s) Oral every 8 hours  aspirin  chewable 81 milliGRAM(s) Oral daily  atorvastatin 80 milliGRAM(s) Oral at bedtime  budesonide 160 MICROgram(s)/formoterol 4.5 MICROgram(s) Inhaler 2 Puff(s) Inhalation two times a day  busPIRone 10 milliGRAM(s) Oral three times a day  chlorhexidine 4% Liquid 1 Application(s) Topical <User Schedule>  clopidogrel Tablet 75 milliGRAM(s) Oral daily  dextrose 40% Gel 15 Gram(s) Oral once  dextrose 5%. 1000 milliLiter(s) (50 mL/Hr) IV Continuous <Continuous>  dextrose 5%. 1000 milliLiter(s) (100 mL/Hr) IV Continuous <Continuous>  dextrose 50% Injectable 25 Gram(s) IV Push once  dextrose 50% Injectable 12.5 Gram(s) IV Push once  dextrose 50% Injectable 25 Gram(s) IV Push once  diltiazem Infusion 5 mG/Hr (5 mL/Hr) IV Continuous <Continuous>  enoxaparin Injectable 40 milliGRAM(s) SubCutaneous daily  glucagon  Injectable 1 milliGRAM(s) IntraMuscular once  insulin glargine Injectable (LANTUS) 12 Unit(s) SubCutaneous at bedtime  insulin lispro (ADMELOG) corrective regimen sliding scale   SubCutaneous three times a day before meals  insulin lispro Injectable (ADMELOG) 4 Unit(s) SubCutaneous three times a day before meals  magnesium oxide 400 milliGRAM(s) Oral three times a day with meals  metoprolol succinate ER 50 milliGRAM(s) Oral daily  polyethylene glycol 3350 17 Gram(s) Oral two times a day  QUEtiapine 100 milliGRAM(s) Oral at bedtime  risperiDONE   Tablet 1 milliGRAM(s) Oral daily  risperiDONE   Tablet 3 milliGRAM(s) Oral at bedtime  sacubitril 24 mG/valsartan 26 mG 1 Tablet(s) Oral two times a day  senna 2 Tablet(s) Oral at bedtime  torsemide 20 milliGRAM(s) Oral two times a day    MEDICATIONS  (PRN):  bisacodyl Suppository 10 milliGRAM(s) Rectal daily PRN Constipation      FAMILY HISTORY:  No pertinent family history in first degree relatives        SOCIAL HISTORY:    CIGARETTES:    ALCOHOL:    Past Surgical History:    Allergies:    No Known Allergies      REVIEW OF SYSTEMS:    CONSTITUTIONAL: No fever, weight loss, chills, shakes, or fatigue  EYES: No eye pain, visual disturbances, or discharge  ENMT:  No difficulty hearing, tinnitus, vertigo; No sinus or throat pain  NECK: No pain or stiffness  BREASTS: No pain, masses, or nipple discharge  RESPIRATORY: No cough, wheezing, hemoptysis, or shortness of breath  CARDIOVASCULAR: No chest pain, dyspnea, palpitations, dizziness, syncope, paroxysmal nocturnal dyspnea, orthopnea, or arm or leg swelling  GASTROINTESTINAL: No abdominal  or epigastric pain, nausea, vomiting, hematemesis, diarrhea, constipation, melena or bright red blood.  GENITOURINARY: No dysuria, nocturia, hematuria, or urinary incontinence  NEUROLOGICAL: No headaches, memory loss, slurred speech, limb weakness, loss of strength, numbness, or tremors  SKIN: No itching, burning, rashes, or lesions   LYMPH NODES: No enlarged glands  ENDOCRINE: No heat or cold intolerance, or hair loss  MUSCULOSKELETAL: No joint pain or swelling, muscle, back, or extremity pain  PSYCHIATRIC: No depression, anxiety, or difficulty sleeping  HEME/LYMPH: No easy bruising or bleeding gums  ALLERY AND IMMUNOLOGIC: No hives or rash.      Vital Signs Last 24 Hrs  T(C): 36.1 (2021 20:00), Max: 36.2 (2021 04:43)  T(F): 96.9 (2021 20:00), Max: 97.2 (2021 13:40)  HR: 76 (2021 20:00) (76 - 108)  BP: 137/70 (2021 20:00) (115/67 - 167/68)  BP(mean): --  RR: 18 (2021 20:00) (16 - 18)  SpO2: --    PHYSICAL EXAM:        GENERAL: In no apparent distress, well nourished, and hydrated.   HEAD:  Atraumatic, Normocephalic  EYES: EOMI, PERRLA, conjunctiva and sclera clear  ENMT: No tonsillar erythema, exudates, or enlargements; ist mucous membranes, Good dentition, No lesions  NECK: Supple and normal thyroid.  No JVD or carotid bruit.  Carotid pulse is 2+ bilaterally.  HEART: Regular rate and rhythm; No murmurs, rubs, or gallops.  PULMONARY: Clear to auscultation and perfusion.  No rales, wheezing, or rhonchi bilaterally- limited study  ABDOMEN: Soft, Nontender, Nondistended; Bowel sounds present  EXTREMITIES:  2+ Peripheral Pulses, No clubbing, cyanosis, or edema  NEUROLOGICAL: Grossly nonfocal      INTERPRETATION OF TELEMETRY:    ECG:    I&O's Detail    15 Davin 2021 07:01  -  2021 07:00  --------------------------------------------------------  IN:    Diltiazem: 45 mL    Oral Fluid: 540 mL  Total IN: 585 mL    OUT:    Voided (mL): 1475 mL  Total OUT: 1475 mL    Total NET: -890 mL      2021 07:01  -  2021 21:27  --------------------------------------------------------  IN:    Oral Fluid: 320 mL  Total IN: 320 mL    OUT:    Voided (mL): 600 mL  Total OUT: 600 mL    Total NET: -280 mL          LABS:                        12.0   5.05  )-----------( 268      ( 2021 05:45 )             39.6     06-16    139  |  98  |  22<H>  ----------------------------<  162<H>  3.9   |  33<H>  |  0.8    Ca    9.1      2021 05:45  Mg     1.8     -16    TPro  6.0  /  Alb  3.3<L>  /  TBili  0.5  /  DBili  x   /  AST  20  /  ALT  12  /  AlkPhos  95  06-15    CARDIAC MARKERS ( 15 Davin 2021 06:15 )  x     / <0.01 ng/mL / x     / x     / x              BNP  I&O's Detail    15 Davin 2021 07:01  -  2021 07:00  --------------------------------------------------------  IN:    Diltiazem: 45 mL    Oral Fluid: 540 mL  Total IN: 585 mL    OUT:    Voided (mL): 1475 mL  Total OUT: 1475 mL    Total NET: -890 mL      2021 07:01  -  2021 21:27  --------------------------------------------------------  IN:    Oral Fluid: 320 mL  Total IN: 320 mL    OUT:    Voided (mL): 600 mL  Total OUT: 600 mL    Total NET: -280 mL        Daily     Daily Weight in k.1 (2021 04:43)    RADIOLOGY & ADDITIONAL STUDIES:

## 2021-06-16 NOTE — PROGRESS NOTE ADULT - SUBJECTIVE AND OBJECTIVE BOX
CHIEF COMPLAINT:    Patient is a 56y old  Female who presents with a chief complaint of CHF exacerbation    INTERVAL HPI/OVERNIGHT EVENTS:    Patient seen and examined at bedside. No acute overnight events occurred.    ROS: Denies SOB, chest pain All other systems are negative.    Vital Signs:    T(F): 97.2 (21 @ 13:40), Max: 97.4 (06-15-21 @ 20:16)  HR: 108 (21 @ 13:40) (72 - 108)  BP: 167/68 (21 @ 13:40) (115/67 - 167/68)  RR: 18 (21 @ 13:40) (16 - 18)  SpO2: 100% (06-15-21 @ 20:07) (100% - 100%)  I&O's Summary    15 Davin 2021 07:01  -  2021 07:00  --------------------------------------------------------  IN: 585 mL / OUT: 1475 mL / NET: -890 mL    2021 07:01  -  2021 14:16  --------------------------------------------------------  IN: 200 mL / OUT: 600 mL / NET: -400 mL      Daily     Daily Weight in k.1 (2021 04:43)  CAPILLARY BLOOD GLUCOSE      POCT Blood Glucose.: 208 mg/dL (2021 13:31)  POCT Blood Glucose.: 154 mg/dL (2021 07:45)  POCT Blood Glucose.: 175 mg/dL (15 Davin 2021 21:08)  POCT Blood Glucose.: 147 mg/dL (15 Davin 2021 18:05)      PHYSICAL EXAM:  GENERAL:  NAD  SKIN: No rashes or lesions  HEENT: Atraumatic. Normocephalic. Anicteric  NECK:  No JVD.   PULMONARY: Clear to ausculation bilaterally. No wheezing. No rales  CVS: Normal S1, S2. Regular rate and rhythm. No murmurs.  ABDOMEN/GI: Soft, Nontender, Nondistended; Bowel sounds are present  EXTREMITIES:  No edema B/L LE.  NEUROLOGIC:  No motor deficit.  PSYCH: Alert & oriented x 3, normal affect    Consultant(s) Notes Reviewed:  [x ] YES  [ ] NO  Care Discussed with Consultants/Other Providers [ x] YES  [ ] NO    LABS:                        12.0   5.05  )-----------( 268      ( 2021 05:45 )             39.6     06-16    139  |  98  |  22<H>  ----------------------------<  162<H>  3.9   |  33<H>  |  0.8    Ca    9.1      2021 05:45  Mg     1.8     -    TPro  6.0  /  Alb  3.3<L>  /  TBili  0.5  /  DBili  x   /  AST  20  /  ALT  12  /  AlkPhos  95  06-15        Trop <0.01, CKMB --, CK --, 06-15- @ 06:15        RADIOLOGY & ADDITIONAL TESTS:  Imaging or report Personally Reviewed:  [ ] YES  [ ] NO    Telemetry reviewed independently -- SVT    Medications:  Standing  albuterol/ipratropium for Nebulization. 3 milliLiter(s) Nebulizer once  aMIOdarone    Tablet 400 milliGRAM(s) Oral every 8 hours  aspirin  chewable 81 milliGRAM(s) Oral daily  atorvastatin 80 milliGRAM(s) Oral at bedtime  budesonide 160 MICROgram(s)/formoterol 4.5 MICROgram(s) Inhaler 2 Puff(s) Inhalation two times a day  busPIRone 10 milliGRAM(s) Oral three times a day  chlorhexidine 4% Liquid 1 Application(s) Topical <User Schedule>  clopidogrel Tablet 75 milliGRAM(s) Oral daily  dextrose 40% Gel 15 Gram(s) Oral once  dextrose 5%. 1000 milliLiter(s) IV Continuous <Continuous>  dextrose 5%. 1000 milliLiter(s) IV Continuous <Continuous>  dextrose 50% Injectable 25 Gram(s) IV Push once  dextrose 50% Injectable 12.5 Gram(s) IV Push once  dextrose 50% Injectable 25 Gram(s) IV Push once  enoxaparin Injectable 40 milliGRAM(s) SubCutaneous daily  glucagon  Injectable 1 milliGRAM(s) IntraMuscular once  insulin glargine Injectable (LANTUS) 12 Unit(s) SubCutaneous at bedtime  insulin lispro (ADMELOG) corrective regimen sliding scale   SubCutaneous three times a day before meals  insulin lispro Injectable (ADMELOG) 4 Unit(s) SubCutaneous three times a day before meals  magnesium oxide 400 milliGRAM(s) Oral three times a day with meals  metoprolol succinate  milliGRAM(s) Oral daily  polyethylene glycol 3350 17 Gram(s) Oral two times a day  QUEtiapine 100 milliGRAM(s) Oral at bedtime  regadenoson Injectable 0.4 milliGRAM(s) IV Push once  risperiDONE   Tablet 1 milliGRAM(s) Oral daily  risperiDONE   Tablet 3 milliGRAM(s) Oral at bedtime  sacubitril 24 mG/valsartan 26 mG 1 Tablet(s) Oral two times a day  senna 2 Tablet(s) Oral at bedtime  torsemide 20 milliGRAM(s) Oral two times a day    PRN Meds  bisacodyl Suppository 10 milliGRAM(s) Rectal daily PRN      Case discussed with resident  Care discussed with pt         CHIEF COMPLAINT:    Patient is a 56y old  Female who presents with a chief complaint of CHF exacerbation    INTERVAL HPI/OVERNIGHT EVENTS:    Patient seen and examined at bedside. No acute overnight events occurred.    ROS: Denies SOB, chest pain All other systems are negative.    Vital Signs:    T(F): 97.2 (21 @ 13:40), Max: 97.4 (06-15-21 @ 20:16)  HR: 108 (21 @ 13:40) (72 - 108)  BP: 167/68 (21 @ 13:40) (115/67 - 167/68)  RR: 18 (21 @ 13:40) (16 - 18)  SpO2: 100% (06-15-21 @ 20:07) (100% - 100%)  I&O's Summary    15 Davin 2021 07:01  -  2021 07:00  --------------------------------------------------------  IN: 585 mL / OUT: 1475 mL / NET: -890 mL    2021 07:01  -  2021 14:16  --------------------------------------------------------  IN: 200 mL / OUT: 600 mL / NET: -400 mL      Daily     Daily Weight in k.1 (2021 04:43)  CAPILLARY BLOOD GLUCOSE      POCT Blood Glucose.: 208 mg/dL (2021 13:31)  POCT Blood Glucose.: 154 mg/dL (2021 07:45)  POCT Blood Glucose.: 175 mg/dL (15 Davin 2021 21:08)  POCT Blood Glucose.: 147 mg/dL (15 Davin 2021 18:05)      PHYSICAL EXAM:  GENERAL:  NAD  SKIN: No rashes or lesions  HEENT: Atraumatic. Normocephalic. Anicteric  NECK:  No JVD.   PULMONARY: Clear to ausculation bilaterally. No wheezing. No rales  CVS: Normal S1, S2. Regular rate and rhythm. No murmurs.  ABDOMEN/GI: Soft, Nontender, Nondistended; Bowel sounds are present  EXTREMITIES:  No edema B/L LE.  NEUROLOGIC:  No motor deficit.  PSYCH: Alert & oriented x 3, normal affect      LABS:                        12.0   5.05  )-----------( 268      ( 2021 05:45 )             39.6     06-    139  |  98  |  22<H>  ----------------------------<  162<H>  3.9   |  33<H>  |  0.8    Ca    9.1      2021 05:45  Mg     1.8     -    TPro  6.0  /  Alb  3.3<L>  /  TBili  0.5  /  DBili  x   /  AST  20  /  ALT  12  /  AlkPhos  95  06-15        Trop <0.01, CKMB --, CK --, 06-15- @ 06:15        RADIOLOGY & ADDITIONAL TESTS:  Imaging or report Personally Reviewed:  [ ] YES  [ ] NO    Telemetry reviewed independently -- SVT    Medications:  Standing  albuterol/ipratropium for Nebulization. 3 milliLiter(s) Nebulizer once  aMIOdarone    Tablet 400 milliGRAM(s) Oral every 8 hours  aspirin  chewable 81 milliGRAM(s) Oral daily  atorvastatin 80 milliGRAM(s) Oral at bedtime  budesonide 160 MICROgram(s)/formoterol 4.5 MICROgram(s) Inhaler 2 Puff(s) Inhalation two times a day  busPIRone 10 milliGRAM(s) Oral three times a day  chlorhexidine 4% Liquid 1 Application(s) Topical <User Schedule>  clopidogrel Tablet 75 milliGRAM(s) Oral daily  dextrose 40% Gel 15 Gram(s) Oral once  dextrose 5%. 1000 milliLiter(s) IV Continuous <Continuous>  dextrose 5%. 1000 milliLiter(s) IV Continuous <Continuous>  dextrose 50% Injectable 25 Gram(s) IV Push once  dextrose 50% Injectable 12.5 Gram(s) IV Push once  dextrose 50% Injectable 25 Gram(s) IV Push once  enoxaparin Injectable 40 milliGRAM(s) SubCutaneous daily  glucagon  Injectable 1 milliGRAM(s) IntraMuscular once  insulin glargine Injectable (LANTUS) 12 Unit(s) SubCutaneous at bedtime  insulin lispro (ADMELOG) corrective regimen sliding scale   SubCutaneous three times a day before meals  insulin lispro Injectable (ADMELOG) 4 Unit(s) SubCutaneous three times a day before meals  magnesium oxide 400 milliGRAM(s) Oral three times a day with meals  metoprolol succinate  milliGRAM(s) Oral daily  polyethylene glycol 3350 17 Gram(s) Oral two times a day  QUEtiapine 100 milliGRAM(s) Oral at bedtime  regadenoson Injectable 0.4 milliGRAM(s) IV Push once  risperiDONE   Tablet 1 milliGRAM(s) Oral daily  risperiDONE   Tablet 3 milliGRAM(s) Oral at bedtime  sacubitril 24 mG/valsartan 26 mG 1 Tablet(s) Oral two times a day  senna 2 Tablet(s) Oral at bedtime  torsemide 20 milliGRAM(s) Oral two times a day    PRN Meds  bisacodyl Suppository 10 milliGRAM(s) Rectal daily PRN      Case discussed with resident  Care discussed with pt

## 2021-06-16 NOTE — PROGRESS NOTE ADULT - SUBJECTIVE AND OBJECTIVE BOX
WILLIAN MARY 56y Female  MRN#: [id number]   CODE STATUS:FULL      SUBJECTIVE  Patient is a 56y old Female who presents with a chief complaint of CHF exacerbation (15 Davin 2021 15:25)  Currently admitted to medicine with the primary diagnosis of CHF exacerbation    Patient had cardiac stress test. Still tachycardic today. EP reconsulted.      OBJECTIVE  PAST MEDICAL & SURGICAL HISTORY  Hypertension    Hyperlipidemia    Anxiety and depression    COPD, severe    CHF (congestive heart failure)    Cerebrovascular accident (CVA)  Multiple    Type 2 diabetes mellitus    CKD (chronic kidney disease), stage II    No significant past surgical history      ALLERGIES:  No Known Allergies    MEDICATIONS:  STANDING MEDICATIONS  albuterol/ipratropium for Nebulization. 3 milliLiter(s) Nebulizer once  aMIOdarone    Tablet 400 milliGRAM(s) Oral every 8 hours  aspirin  chewable 81 milliGRAM(s) Oral daily  atorvastatin 80 milliGRAM(s) Oral at bedtime  budesonide 160 MICROgram(s)/formoterol 4.5 MICROgram(s) Inhaler 2 Puff(s) Inhalation two times a day  busPIRone 10 milliGRAM(s) Oral three times a day  chlorhexidine 4% Liquid 1 Application(s) Topical <User Schedule>  clopidogrel Tablet 75 milliGRAM(s) Oral daily  dextrose 40% Gel 15 Gram(s) Oral once  dextrose 5%. 1000 milliLiter(s) IV Continuous <Continuous>  dextrose 5%. 1000 milliLiter(s) IV Continuous <Continuous>  dextrose 50% Injectable 25 Gram(s) IV Push once  dextrose 50% Injectable 12.5 Gram(s) IV Push once  dextrose 50% Injectable 25 Gram(s) IV Push once  enoxaparin Injectable 40 milliGRAM(s) SubCutaneous daily  glucagon  Injectable 1 milliGRAM(s) IntraMuscular once  insulin glargine Injectable (LANTUS) 12 Unit(s) SubCutaneous at bedtime  insulin lispro (ADMELOG) corrective regimen sliding scale   SubCutaneous three times a day before meals  insulin lispro Injectable (ADMELOG) 4 Unit(s) SubCutaneous three times a day before meals  losartan 100 milliGRAM(s) Oral daily  magnesium oxide 400 milliGRAM(s) Oral three times a day with meals  metoprolol succinate  milliGRAM(s) Oral daily  polyethylene glycol 3350 17 Gram(s) Oral two times a day  QUEtiapine 100 milliGRAM(s) Oral at bedtime  regadenoson Injectable 0.4 milliGRAM(s) IV Push once  risperiDONE   Tablet 1 milliGRAM(s) Oral daily  risperiDONE   Tablet 3 milliGRAM(s) Oral at bedtime  senna 2 Tablet(s) Oral at bedtime  torsemide 20 milliGRAM(s) Oral two times a day    PRN MEDICATIONS  bisacodyl Suppository 10 milliGRAM(s) Rectal daily PRN      VITAL SIGNS: Last 24 Hours  T(C): 36.2 (16 Jun 2021 04:43), Max: 36.3 (15 Davin 2021 20:16)  T(F): 97.1 (16 Jun 2021 04:43), Max: 97.4 (15 Davin 2021 20:16)  HR: 107 (16 Jun 2021 04:43) (70 - 107)  BP: 115/67 (16 Jun 2021 04:43) (115/67 - 135/65)  BP(mean): --  RR: 16 (16 Jun 2021 04:43) (16 - 16)  SpO2: 100% (15 Davin 2021 20:07) (100% - 100%)    LABS:                        12.0   5.05  )-----------( 268      ( 16 Jun 2021 05:45 )             39.6     06-16    139  |  98  |  22<H>  ----------------------------<  162<H>  3.9   |  33<H>  |  0.8    Ca    9.1      16 Jun 2021 05:45  Mg     1.8     06-16    TPro  6.0  /  Alb  3.3<L>  /  TBili  0.5  /  DBili  x   /  AST  20  /  ALT  12  /  AlkPhos  95  06-15    CARDIAC MARKERS ( 15 Davin 2021 06:15 )  x     / <0.01 ng/mL / x     / x     / x          PHYSICAL EXAM:  GENERAL: NAD, well-developed, AAOx3  HEENT:  Atraumatic, Normocephalic. EOMI, PERRLA, conjunctiva and sclera clear, No JVD  PULMONARY: Vesicular breath sounds  CARDIOVASCULAR: Regular rate and rhythm; No murmurs, rubs, or gallops  GASTROINTESTINAL: Soft, Nontender, Nondistended; Bowel sounds present  MUSCULOSKELETAL:  Mild swelling lower extremities, non-pitting  NEUROLOGY: non-focal  SKIN: No rashes or lesions        ASSESSMENT & PLAN    Patient is a 56 year old woman with PMH chronic hypoxic respiratory failure (on 4L home O2) secondary to  HFrEF 38%, HTN, HLD, DM2, CORNELIUS on CPAP, CVA with residual LE weakness (2014, wheelchair bound),  COPD, depression who presents of worsening shortness of breath and increase weight with  B/L LE x 2weeks. Admitted for acute on chronic CHF exacerbation.    1) Acute on chronic CHF exacerbation with resolved acute on chronic hypoxic respiratory failure  - Currently on 4L NC (on 4L at home)  - BNP 6/6: 7726  - CXR 6/6: cardiomegaly and bilateral interstitial opacities; repeat CXR 6/15 improving   - Switch IV lasix 40mg q12h to torsemide 20mg BID; continue for 10 days and switch to 20mg q24hrs afterward with additional dose only for weight gain >2lbs   - Daily weights/ strict I+O/ fluid restrict  - Continue  metoprolol succinate 200mg q24hrs  - Switch losartan 100mg q24hrs to Entresto 24-26 BID  - Heart failure consult appreciated   - Echo 6/2021: EF 35-40%, grade III diastolic dysfunction  - Cardiology consult appreciated  - Had stress test 6/16    2) Narrow complex tachycardia  -Seems to be Atrial tachycardia   -On metoprolol 200mg q24hrs  -EP consult appreciated  -Started diltiazem 6/14; currently off drip  -Started oral amiodarone loading 400mg q8hrs  (6/15); however still tachycardic--EP reconsulted  -On clopidogrel and ASA; may need full AC    3) DM II  - Lantus 12 qhs, lispro 4/4/4  - 6/7/21 A1c 6.5    4) HLD  - Continue atorvastatin 80mg qhs  - Lipid panel: LDL 42, HDL 29, triglycerides 183     5) COPD/pulmonary HTN  - Continue inhalers    6) CORNELIUS/OHS ? on CPAP  - CPAP at night  - Outpatient pulm follow-up    7) CVA with residual LE weakness  - c/w ASA/plavix/statin    8) Depression/ mood disorder  - Continue risperidone 3mg qhs, quetiapine 100mg qhs, buspirone 10mg TID    MISC:  - DVT ppx: lovenox  - PPI ppx: NA  - DIET: DASH carb consistent, fluid restriction  - Code Status: Full Code

## 2021-06-16 NOTE — PROGRESS NOTE ADULT - ASSESSMENT
55 yo F PMHx HTN, DM II, HFrEF, COPD on 4L O2, CVA with residual LE weakness and depression came to ED for worsening SOB and LE edema for the last two weeks. She was not taking her Lasix.   In the ED, Chest X-ray shows pulm vascular congestion and cardiomegaly. BNP elevated. Started on IV Lasix. Pt hypoxic to 94% on NC    A/P:   Acute on chronic hypoxic respiratory failure  Acute HFrEF:   Patient with LE edema, SOB , Pro-BNO  CXR showed vascular congestion. Repeated CXR 6/14 showed improved vascular congestion and interstitial edema.   Echo LVEF 38%, grade II diastolic dysfunction, mild to mod MR, mild to mod TR, mild AR, mod Pulm HTN.   s/p Lasix 40mg IV BID, switched to Torsemide 20mg BID today, continue Metoprolol XL 200mg mg Daily and, switch Lisinopril to Losartan 100mg daily.   Cardiology recommended ischemic work up, send Nuclear stress test.   Daily weight, fluid restriction 1.5L /day, low sodium diet.     Atrial Tachycardia.   HR back to Sinus rhythm, avoid Cardizem.   EP request pulmonary clearance to start on Amiodarone, start on Amiodarone. EP follow up.     Chronic Respiratory failure:   COPD  Chronic tobacco abuse:   Continue Symbicort, continue DuoNeb prn.     History of CVA with residual LE weakness  Continue ASA, Lipitor   Magnesium deficiency.    HTN: Continue lisinopril, Lasix and Metoprolol.    DM II: FS is controlled, A1C 6.8, continue Lantus 12 units and Pre-meals Lispro.     Depression: Continue Wellbutrin and Risperidone.    #Progress Note Handoff:  Pending (specify): Nuclear stress, improving HR.   Family discussion:  need to continue Iv diuresis, daily weight. Counseled for smoking cessation.   Disposition: Home possible in 24 hrs with home care.    57 yo F PMHx HTN, DM II, HFrEF, COPD on 4L O2, CVA with residual LE weakness and depression came to ED for worsening SOB and LE edema for the last two weeks. She was not taking her Lasix as it was not prescribe into her blister pack.   In the ED, Chest X-ray showed pulmonary vascular congestion and cardiomegaly. BNP elevated. Started on IV Lasix with resolution of exerbation. Pt now with atrial tachycardia    Acute on chronic hypoxic respiratory failure due to Acute on chronic HFrEF  - resolved  - Echo LVEF 38%, grade II diastolic dysfunction, mild to mod MR, mild to mod TR, mild AR, mod Pulm HTN.   - s/p Lasix 40mg IV BID, switched to Torsemide 20mg BID continue Metoprolol XL 200mg mg Daily and, switch Lisinopril to Losartan 100mg daily.   - Cardiology recommended ischemic work up, send Nuclear stress test as pt declined caths--prelimary report shows likely area of ischemia. Follow up official report and will discuss cardiac cath with pt.  - Daily weight, fluid restriction 1.5L /day, low sodium diet.     Atrial Tachycardia.   - remains intermittently tachycardiac, EP to follow up  - c/w amio    Chronic Respiratory failure/COPD  - Continue Symbicort, continue DuoNeb prn.     History of CVA with residual LE weakness  - Continue ASA, Lipitor     HTN   Continue losartan, Lasix and Metoprolol.    DM II  - FS is controlled, A1C 6.8, continue Lantus 12 units and Pre-meals Lispro.     Depression   Continue Wellbutrin and Risperidone.    #Progress Note Handoff:  Pending (specify): Nuclear stress final report, improving HR.   Family discussion:  need to continue Iv diuresis, daily weight. Counseled for smoking cessation.   Disposition: Home possible in 24 hrs with home care.    55 yo F PMHx HTN, DM II, HFrEF, COPD on 4L O2, CVA with residual LE weakness and depression came to ED for worsening SOB and LE edema for the last two weeks. She was not taking her Lasix as it was not prescribe into her blister pack.   In the ED, Chest X-ray showed pulmonary vascular congestion and cardiomegaly. BNP elevated. Started on IV Lasix with resolution of exerbation. Pt now with atrial tachycardia    Acute on chronic hypoxic respiratory failure due to Acute on chronic HFrEF  - resolved  - Echo LVEF 38%, grade II diastolic dysfunction, mild to mod MR, mild to mod TR, mild AR, mod Pulm HTN.   - s/p Lasix 40mg IV BID, switched to Torsemide 20mg BID continue Metoprolol XL 200mg mg Daily and, switch Lisinopril to Losartan 100mg daily.   - Cardiology recommended ischemic work up, send Nuclear stress test as pt declined caths--prelimary report shows likely area of ischemia. Follow up official report. Discussed prelim report with pt-she still thinks she wants to defer cath despite discussing risks. She is agreeable to thinking about it overnight and will make decision by morning. Continue with telemetry monitoring  - Daily weight, fluid restriction 1.5L /day, low sodium diet.     Atrial Tachycardia.   - remains intermittently tachycardiac, EP to follow up  - c/w amio    Chronic Respiratory failure/COPD  - Continue Symbicort, continue DuoNeb prn.     History of CVA with residual LE weakness  - Continue ASA, Lipitor     HTN   Continue losartan, Lasix and Metoprolol.    DM II  - FS is controlled, A1C 6.8, continue Lantus 12 units and Pre-meals Lispro.     Depression   Continue Wellbutrin and Risperidone.    #Progress Note Handoff:  Pending (specify): Nuclear stress final report, improving HR.   Family discussion:  need to continue Iv diuresis, daily weight. Counseled for smoking cessation.   Disposition: Home possible in 24 hrs with home care.

## 2021-06-16 NOTE — PROGRESS NOTE ADULT - ASSESSMENT
## Recurrent SVT-likely atrial tachycardia  ## HFrEF    - Reduce metoprolol dose  - Restart Cardizem at 5 mg/hr- titrate up as BP tolerates  - Continue amiodarone- may switch to iv assuming reduced absorption with ongoing fluid overload  - if no control bianca, may need YOKASTA/DCCV. Keep NPO p midnight  - Diuresis as per HF team  - Discussed with her and her brother yani that recurrent episodes will need possible ablation- however, considering her O2 dependence, it will be higher risk procedure from airway protection. If needed and they are agreeable, will need anesthesia consult.

## 2021-06-17 LAB
ANION GAP SERPL CALC-SCNC: 13 MMOL/L — SIGNIFICANT CHANGE UP (ref 7–14)
ANISOCYTOSIS BLD QL: SLIGHT — SIGNIFICANT CHANGE UP
BASOPHILS # BLD AUTO: 0.09 K/UL — SIGNIFICANT CHANGE UP (ref 0–0.2)
BASOPHILS NFR BLD AUTO: 1.8 % — HIGH (ref 0–1)
BUN SERPL-MCNC: 22 MG/DL — HIGH (ref 10–20)
CALCIUM SERPL-MCNC: 8.9 MG/DL — SIGNIFICANT CHANGE UP (ref 8.5–10.1)
CHLORIDE SERPL-SCNC: 99 MMOL/L — SIGNIFICANT CHANGE UP (ref 98–110)
CO2 SERPL-SCNC: 31 MMOL/L — SIGNIFICANT CHANGE UP (ref 17–32)
CREAT SERPL-MCNC: 1 MG/DL — SIGNIFICANT CHANGE UP (ref 0.7–1.5)
EOSINOPHIL # BLD AUTO: 0.09 K/UL — SIGNIFICANT CHANGE UP (ref 0–0.7)
EOSINOPHIL NFR BLD AUTO: 1.8 % — SIGNIFICANT CHANGE UP (ref 0–8)
GIANT PLATELETS BLD QL SMEAR: PRESENT — SIGNIFICANT CHANGE UP
GLUCOSE BLDC GLUCOMTR-MCNC: 124 MG/DL — HIGH (ref 70–99)
GLUCOSE BLDC GLUCOMTR-MCNC: 140 MG/DL — HIGH (ref 70–99)
GLUCOSE BLDC GLUCOMTR-MCNC: 151 MG/DL — HIGH (ref 70–99)
GLUCOSE BLDC GLUCOMTR-MCNC: 222 MG/DL — HIGH (ref 70–99)
GLUCOSE SERPL-MCNC: 227 MG/DL — HIGH (ref 70–99)
HCT VFR BLD CALC: 38.8 % — SIGNIFICANT CHANGE UP (ref 37–47)
HGB BLD-MCNC: 11.7 G/DL — LOW (ref 12–16)
HYPOCHROMIA BLD QL: SLIGHT — SIGNIFICANT CHANGE UP
LYMPHOCYTES # BLD AUTO: 1.23 K/UL — SIGNIFICANT CHANGE UP (ref 1.2–3.4)
LYMPHOCYTES # BLD AUTO: 24.8 % — SIGNIFICANT CHANGE UP (ref 20.5–51.1)
MAGNESIUM SERPL-MCNC: 1.6 MG/DL — LOW (ref 1.8–2.4)
MANUAL SMEAR VERIFICATION: SIGNIFICANT CHANGE UP
MCHC RBC-ENTMCNC: 27 PG — SIGNIFICANT CHANGE UP (ref 27–31)
MCHC RBC-ENTMCNC: 30.2 G/DL — LOW (ref 32–37)
MCV RBC AUTO: 89.6 FL — SIGNIFICANT CHANGE UP (ref 81–99)
MONOCYTES # BLD AUTO: 0.22 K/UL — SIGNIFICANT CHANGE UP (ref 0.1–0.6)
MONOCYTES NFR BLD AUTO: 4.4 % — SIGNIFICANT CHANGE UP (ref 1.7–9.3)
NEUTROPHILS # BLD AUTO: 3.12 K/UL — SIGNIFICANT CHANGE UP (ref 1.4–6.5)
NEUTROPHILS NFR BLD AUTO: 62.8 % — SIGNIFICANT CHANGE UP (ref 42.2–75.2)
PLAT MORPH BLD: NORMAL — SIGNIFICANT CHANGE UP
PLATELET # BLD AUTO: 262 K/UL — SIGNIFICANT CHANGE UP (ref 130–400)
POIKILOCYTOSIS BLD QL AUTO: SLIGHT — SIGNIFICANT CHANGE UP
POLYCHROMASIA BLD QL SMEAR: SLIGHT — SIGNIFICANT CHANGE UP
POTASSIUM SERPL-MCNC: 3.5 MMOL/L — SIGNIFICANT CHANGE UP (ref 3.5–5)
POTASSIUM SERPL-SCNC: 3.5 MMOL/L — SIGNIFICANT CHANGE UP (ref 3.5–5)
PROMYELOCYTES # FLD: 0.9 % — HIGH (ref 0–0)
RBC # BLD: 4.33 M/UL — SIGNIFICANT CHANGE UP (ref 4.2–5.4)
RBC # FLD: 17.2 % — HIGH (ref 11.5–14.5)
RBC BLD AUTO: ABNORMAL
SMUDGE CELLS # BLD: PRESENT — SIGNIFICANT CHANGE UP
SODIUM SERPL-SCNC: 143 MMOL/L — SIGNIFICANT CHANGE UP (ref 135–146)
STOMATOCYTES BLD QL SMEAR: SLIGHT — SIGNIFICANT CHANGE UP
VARIANT LYMPHS # BLD: 3.5 % — SIGNIFICANT CHANGE UP (ref 0–5)
WBC # BLD: 4.97 K/UL — SIGNIFICANT CHANGE UP (ref 4.8–10.8)
WBC # FLD AUTO: 4.97 K/UL — SIGNIFICANT CHANGE UP (ref 4.8–10.8)

## 2021-06-17 PROCEDURE — 99221 1ST HOSP IP/OBS SF/LOW 40: CPT

## 2021-06-17 PROCEDURE — 99233 SBSQ HOSP IP/OBS HIGH 50: CPT

## 2021-06-17 PROCEDURE — 93010 ELECTROCARDIOGRAM REPORT: CPT

## 2021-06-17 RX ORDER — AMIODARONE HYDROCHLORIDE 400 MG/1
150 TABLET ORAL ONCE
Refills: 0 | Status: COMPLETED | OUTPATIENT
Start: 2021-06-17 | End: 2021-06-17

## 2021-06-17 RX ORDER — MAGNESIUM SULFATE 500 MG/ML
2 VIAL (ML) INJECTION ONCE
Refills: 0 | Status: COMPLETED | OUTPATIENT
Start: 2021-06-17 | End: 2021-06-17

## 2021-06-17 RX ORDER — DILTIAZEM HCL 120 MG
30 CAPSULE, EXT RELEASE 24 HR ORAL EVERY 6 HOURS
Refills: 0 | Status: DISCONTINUED | OUTPATIENT
Start: 2021-06-17 | End: 2021-06-18

## 2021-06-17 RX ORDER — AMIODARONE HYDROCHLORIDE 400 MG/1
1 TABLET ORAL
Qty: 900 | Refills: 0 | Status: DISCONTINUED | OUTPATIENT
Start: 2021-06-17 | End: 2021-06-20

## 2021-06-17 RX ORDER — AMIODARONE HYDROCHLORIDE 400 MG/1
0.5 TABLET ORAL
Qty: 900 | Refills: 0 | Status: DISCONTINUED | OUTPATIENT
Start: 2021-06-17 | End: 2021-06-20

## 2021-06-17 RX ADMIN — Medication 50 MILLIGRAM(S): at 05:58

## 2021-06-17 RX ADMIN — CHLORHEXIDINE GLUCONATE 1 APPLICATION(S): 213 SOLUTION TOPICAL at 06:00

## 2021-06-17 RX ADMIN — AMIODARONE HYDROCHLORIDE 600 MILLIGRAM(S): 400 TABLET ORAL at 10:29

## 2021-06-17 RX ADMIN — AMIODARONE HYDROCHLORIDE 33.3 MG/MIN: 400 TABLET ORAL at 10:29

## 2021-06-17 RX ADMIN — Medication 2: at 17:21

## 2021-06-17 RX ADMIN — Medication 4 UNIT(S): at 17:21

## 2021-06-17 RX ADMIN — Medication 1 MILLIGRAM(S): at 21:28

## 2021-06-17 RX ADMIN — ENOXAPARIN SODIUM 40 MILLIGRAM(S): 100 INJECTION SUBCUTANEOUS at 14:46

## 2021-06-17 RX ADMIN — CLOPIDOGREL BISULFATE 75 MILLIGRAM(S): 75 TABLET, FILM COATED ORAL at 12:23

## 2021-06-17 RX ADMIN — SACUBITRIL AND VALSARTAN 1 TABLET(S): 24; 26 TABLET, FILM COATED ORAL at 05:58

## 2021-06-17 RX ADMIN — MAGNESIUM OXIDE 400 MG ORAL TABLET 400 MILLIGRAM(S): 241.3 TABLET ORAL at 17:23

## 2021-06-17 RX ADMIN — Medication 25 GRAM(S): at 08:39

## 2021-06-17 RX ADMIN — RISPERIDONE 1 MILLIGRAM(S): 4 TABLET ORAL at 12:24

## 2021-06-17 RX ADMIN — INSULIN GLARGINE 12 UNIT(S): 100 INJECTION, SOLUTION SUBCUTANEOUS at 21:52

## 2021-06-17 RX ADMIN — SACUBITRIL AND VALSARTAN 1 TABLET(S): 24; 26 TABLET, FILM COATED ORAL at 17:23

## 2021-06-17 RX ADMIN — Medication 30 MILLIGRAM(S): at 10:10

## 2021-06-17 RX ADMIN — Medication 20 MILLIGRAM(S): at 17:23

## 2021-06-17 RX ADMIN — Medication 81 MILLIGRAM(S): at 12:24

## 2021-06-17 RX ADMIN — Medication 20 MILLIGRAM(S): at 05:58

## 2021-06-17 RX ADMIN — Medication 10 MILLIGRAM(S): at 05:58

## 2021-06-17 RX ADMIN — MAGNESIUM OXIDE 400 MG ORAL TABLET 400 MILLIGRAM(S): 241.3 TABLET ORAL at 08:40

## 2021-06-17 RX ADMIN — ATORVASTATIN CALCIUM 80 MILLIGRAM(S): 80 TABLET, FILM COATED ORAL at 21:27

## 2021-06-17 RX ADMIN — AMIODARONE HYDROCHLORIDE 400 MILLIGRAM(S): 400 TABLET ORAL at 05:58

## 2021-06-17 RX ADMIN — MAGNESIUM OXIDE 400 MG ORAL TABLET 400 MILLIGRAM(S): 241.3 TABLET ORAL at 12:23

## 2021-06-17 RX ADMIN — Medication 30 MILLIGRAM(S): at 17:23

## 2021-06-17 RX ADMIN — AMIODARONE HYDROCHLORIDE 16.7 MG/MIN: 400 TABLET ORAL at 17:22

## 2021-06-17 NOTE — PROGRESS NOTE ADULT - SUBJECTIVE AND OBJECTIVE BOX
Interval History:    - Responding well to oral diuretics but remains overloaded. She is in sinus rhythm today. On amiodarone gtt.       HISTORY OF PRESENT ILLNESS:     56 year old female with a pmhx of chronic hypoxic respiratory failure (on 4L home O2) secondary to  HFrEF 38% (last exacerbation in ) , HTN, HLD, DM2, active smoker , CVA with residual LE weakness (, wheelchair bound),  COPD , mood /depression,  presents of worsening shortness of breath and increase weight with  B/L LE x 2weeks. Patient mentions that she hasn't been taking Lasix for the past two weeks as she ran out of it. Reports she is non compliant with fluid intake. Denies any chest pain, nausea, vomiting, urinary symptoms, fever, chills. Of note patient received 1 st dose of Moderna two weeks, due for 2nd shot on .    Patient reports worsening SOB for the past few weeks, now feeling better after diuresis. Patient endorses non-compliance with low sodium diet and was not aware she did not receive her Lasix on her medication package from the pharmacy. Patient denies c/p, palpitations, headaches, bleeding, LH, dizziness, orthopnea, PND, LOC, cough, feeling bloated, N/V/D.        PAST MEDICAL & SURGICAL HISTORY:    Hypertension  Hyperlipidemia  Anxiety and depression  COPD, severe  CHF (congestive heart failure)  Cerebrovascular accident (CVA) Multiple  Type 2 diabetes mellitus  CKD (chronic kidney disease), stage II    No significant past surgical history        FAMILY HISTORY:    No pertinent family history of premature cardiovascular disease in first degree relatives.  Mother:  of cancer at 65  Father:  of an overdose at 50    SOCIAL HISTORY:    Smokes at least a cigarette a day, denies alcohol or drug use, lives alone    Allergies    No Known Allergies    Intolerances      PHYSICAL EXAM:    General Appearance: well appearing, normal for age and gender. 	  Neck: normal JVP, no bruit.   Cardiovascular: regular rate and rhythm S1 S2, + JVD, No murmurs, 1+le edema  Respiratory: Lungs clear to auscultation	  Psychiatry: Alert and oriented x 3, Mood & affect appropriate  Gastrointestinal:  Soft, Non-tender  Musculoskeletal/ extremities: Normal range of motion, No clubbing, cyanosis ble edema  Vascular: Peripheral pulses palpable 2+ bilaterally      PREVIOUS DIAGNOSTIC TESTING:      TTE     Summary:   1. Moderately decreased segmental left ventricular systolic function.   2. LV Ejection Fraction by Urena's Method with a biplane EF of 38 %.   3. Spectral Doppler shows pseudonormal pattern of left ventricular myocardial filling (Grade IIdiastolic dysfunction).   4. Mild to moderate mitral valve regurgitation.   5. The mitral valve leaflets are tethered which is due to reduced systolic function and elevated LVDP.   6. Mild-moderate tricuspid regurgitation.   7. Mild aortic regurgitation.   8. Sclerotic aortic valve with normal opening.   9. Estimated pulmonary artery systolic pressure is 59.7 mmHg assuming a right atrial pressure of 15 mmHg, which is consistent with moderate pulmonary hypertension.      Home Medications:  Albuterol (Eqv-ProAir HFA) 90 mcg/inh inhalation aerosol: 2 puff(s) inhaled every 6 hours, As Needed (:)  aspirin 81 mg oral tablet: 1 tab(s) orally once a day ()  BuSpar 10 mg oral tablet: 1 tab(s) orally 3 times a day (:)  fenofibrate 145 mg oral tablet: 1 tab(s) orally once a day (:)  glipiZIDE 10 mg oral tablet: 1 tab(s) orally 2 times a day (:)  Lipitor 40 mg oral tablet: 1 tab(s) orally once a day (:)  lisinopril 40 mg oral tablet: 1 tab(s) orally once a day (:)  metFORMIN 1000 mg oral tablet: 1 tab(s) orally 2 times a day (:)  Metoprolol Tartrate 50 mg oral tablet: 1 tab(s) orally 2 times a day (:)  Norvasc 10 mg oral tablet: 1 tab(s) orally once a day (:)  Plavix 75 mg oral tablet: 1 tab(s) orally once a day (:)  SEROquel 100 mg oral tablet: 2 tab(s) orally once a day (at bedtime) (:)  Tresiba 100 units/mL subcutaneous solution: 50 unit(s) subcutaneous once a day (:)    MEDICATIONS  (STANDING):  aspirin  chewable 81 milliGRAM(s) Oral daily  atorvastatin 80 milliGRAM(s) Oral at bedtime  budesonide 160 MICROgram(s)/formoterol 4.5 MICROgram(s) Inhaler 2 Puff(s) Inhalation two times a day  busPIRone 10 milliGRAM(s) Oral three times a day  chlorhexidine 4% Liquid 1 Application(s) Topical <User Schedule>  clopidogrel Tablet 75 milliGRAM(s) Oral daily  dextrose 40% Gel 15 Gram(s) Oral once  dextrose 5%. 1000 milliLiter(s) (50 mL/Hr) IV Continuous <Continuous>  dextrose 5%. 1000 milliLiter(s) (100 mL/Hr) IV Continuous <Continuous>  dextrose 50% Injectable 25 Gram(s) IV Push once  dextrose 50% Injectable 12.5 Gram(s) IV Push once  dextrose 50% Injectable 25 Gram(s) IV Push once  enoxaparin Injectable 40 milliGRAM(s) SubCutaneous daily  furosemide   Injectable 40 milliGRAM(s) IV Push every 12 hours  glucagon  Injectable 1 milliGRAM(s) IntraMuscular once  insulin glargine Injectable (LANTUS) 12 Unit(s) SubCutaneous at bedtime  insulin lispro (ADMELOG) corrective regimen sliding scale   SubCutaneous three times a day before meals  insulin lispro Injectable (ADMELOG) 4 Unit(s) SubCutaneous three times a day before meals  lisinopril 40 milliGRAM(s) Oral daily  metoprolol succinate  milliGRAM(s) Oral daily  polyethylene glycol 3350 17 Gram(s) Oral two times a day  QUEtiapine 100 milliGRAM(s) Oral at bedtime  risperiDONE   Tablet 1 milliGRAM(s) Oral daily  risperiDONE   Tablet 3 milliGRAM(s) Oral at bedtime  senna 2 Tablet(s) Oral at bedtime    MEDICATIONS  (PRN):  bisacodyl Suppository 10 milliGRAM(s) Rectal daily PRN Constipation

## 2021-06-17 NOTE — PROGRESS NOTE ADULT - SUBJECTIVE AND OBJECTIVE BOX
WILLIAN MARY 56y Female  MRN#: [id number]   CODE STATUS:________      SUBJECTIVE  Patient is a 56y old Female who presents with a chief complaint of CHF exacerbation (16 Jun 2021 21:25)  Currently admitted to medicine with the primary diagnosis of CHF exacerbation    Hospital course has been complicated by _______.   Today is hospital day 11d, and this morning she is _________ and reports ________ overnight events.     Present Today:           Wilkerson Catheter ()No/ ()Yes? Indication:          Central Line ()No/ ()Yes? Indication:          IV Fluids ()No/ ()Yes? Type:  Rate:  Indication:      OBJECTIVE  PAST MEDICAL & SURGICAL HISTORY  Hypertension    Hyperlipidemia    Anxiety and depression    COPD, severe    CHF (congestive heart failure)    Cerebrovascular accident (CVA)  Multiple    Type 2 diabetes mellitus    CKD (chronic kidney disease), stage II    No significant past surgical history      ALLERGIES:  No Known Allergies    MEDICATIONS:  STANDING MEDICATIONS  albuterol/ipratropium for Nebulization. 3 milliLiter(s) Nebulizer once  aMIOdarone Infusion 1 mG/Min IV Continuous <Continuous>  aMIOdarone Infusion 0.5 mG/Min IV Continuous <Continuous>  aspirin  chewable 81 milliGRAM(s) Oral daily  atorvastatin 80 milliGRAM(s) Oral at bedtime  budesonide 160 MICROgram(s)/formoterol 4.5 MICROgram(s) Inhaler 2 Puff(s) Inhalation two times a day  busPIRone 10 milliGRAM(s) Oral three times a day  chlorhexidine 4% Liquid 1 Application(s) Topical <User Schedule>  clopidogrel Tablet 75 milliGRAM(s) Oral daily  dextrose 40% Gel 15 Gram(s) Oral once  dextrose 5%. 1000 milliLiter(s) IV Continuous <Continuous>  dextrose 5%. 1000 milliLiter(s) IV Continuous <Continuous>  dextrose 50% Injectable 25 Gram(s) IV Push once  dextrose 50% Injectable 12.5 Gram(s) IV Push once  dextrose 50% Injectable 25 Gram(s) IV Push once  diltiazem    Tablet 30 milliGRAM(s) Oral every 6 hours  enoxaparin Injectable 40 milliGRAM(s) SubCutaneous daily  glucagon  Injectable 1 milliGRAM(s) IntraMuscular once  insulin glargine Injectable (LANTUS) 12 Unit(s) SubCutaneous at bedtime  insulin lispro (ADMELOG) corrective regimen sliding scale   SubCutaneous three times a day before meals  insulin lispro Injectable (ADMELOG) 4 Unit(s) SubCutaneous three times a day before meals  magnesium oxide 400 milliGRAM(s) Oral three times a day with meals  metoprolol succinate ER 50 milliGRAM(s) Oral daily  polyethylene glycol 3350 17 Gram(s) Oral two times a day  QUEtiapine 100 milliGRAM(s) Oral at bedtime  risperiDONE   Tablet 1 milliGRAM(s) Oral daily  risperiDONE   Tablet 3 milliGRAM(s) Oral at bedtime  sacubitril 24 mG/valsartan 26 mG 1 Tablet(s) Oral two times a day  senna 2 Tablet(s) Oral at bedtime  torsemide 20 milliGRAM(s) Oral two times a day    PRN MEDICATIONS  bisacodyl Suppository 10 milliGRAM(s) Rectal daily PRN      VITAL SIGNS: Last 24 Hours  T(C): 36 (17 Jun 2021 08:00), Max: 36.2 (16 Jun 2021 13:40)  T(F): 96.8 (17 Jun 2021 08:00), Max: 97.2 (16 Jun 2021 13:40)  HR: 71 (17 Jun 2021 08:00) (71 - 108)  BP: 136/81 (17 Jun 2021 08:00) (122/71 - 167/68)  BP(mean): --  RR: 18 (17 Jun 2021 06:00) (16 - 18)  SpO2: 95% (16 Jun 2021 22:00) (95% - 95%)    LABS:                        11.7   4.97  )-----------( 262      ( 17 Jun 2021 05:18 )             38.8     06-17    143  |  99  |  22<H>  ----------------------------<  227<H>  3.5   |  31  |  1.0    Ca    8.9      17 Jun 2021 05:18  Mg     1.6     06-17                    RADIOLOGY:      PHYSICAL EXAM:    GENERAL: NAD, well-developed, AAOx3  HEENT:  Atraumatic, Normocephalic. EOMI, PERRLA, conjunctiva and sclera clear, No JVD  PULMONARY: Clear to auscultation bilaterally; No wheeze  CARDIOVASCULAR: Regular rate and rhythm; No murmurs, rubs, or gallops  GASTROINTESTINAL: Soft, Nontender, Nondistended; Bowel sounds present  MUSCULOSKELETAL:  2+ Peripheral Pulses, No clubbing, cyanosis, or edema  NEUROLOGY: non-focal  SKIN: No rashes or lesions      ASSESSMENT & PLAN    Patient is a 56 year old woman with PMH chronic hypoxic respiratory failure (on 4L home O2) secondary to  HFrEF 38%, HTN, HLD, DM2, CORNELIUS on CPAP, CVA with residual LE weakness (2014, wheelchair bound),  COPD, depression who presents of worsening shortness of breath and increase weight with  B/L LE x 2weeks. Admitted for acute on chronic CHF exacerbation.    1) Acute on chronic CHF exacerbation with resolved acute on chronic hypoxic respiratory failure  - Currently on 4L NC (on 4L at home)  - BNP 6/6: 7726  - CXR 6/6: cardiomegaly and bilateral interstitial opacities; repeat CXR 6/15 improving   - Continue torsemide 20mg BID; continue for 10 days and switch to 20mg q24hrs afterward with additional dose only for weight gain >2lbs   - Daily weights/ strict I+O/ fluid restrict  - Continue  metoprolol succinate 200mg q24hrs, Entresto 24-26 BID  - Heart failure consult appreciated   - Echo 6/2021: EF 35-40%, grade III diastolic dysfunction  - Cardiology consult appreciated  - Had stress test 6/16; showing "MODERATE REVERSIBLE DEFECT IN THE SEPTUM, ANTERIOR AND INFERIOR WALL OF THE LEFT VENTRICLE CONSISTENT WITH ISCHEMIA"; cardiology reconsulted    2) Narrow complex tachycardia  -Seems to be Atrial tachycardia   -On metoprolol 200mg q24hrs  -EP consult appreciated  -Started diltiazem 6/14; restarted 6/16--switch to diltiazem 30mg q6hrs (heart failure aware per EP)  -Start amiodarone drip  -On clopidogrel and ASA; may need full AC    3) DM II  - Lantus 12 qhs, lispro 4/4/4  - 6/7/21 A1c 6.5    4) HLD  - Continue atorvastatin 80mg qhs  - Lipid panel: LDL 42, HDL 29, triglycerides 183     5) COPD/pulmonary HTN  - Continue inhalers    6) CORNELIUS/OHS ? on CPAP  - CPAP at night  - Outpatient pulm follow-up    7) CVA with residual LE weakness  - c/w ASA/plavix/statin    8) Depression/ mood disorder  - Continue risperidone 3mg qhs, quetiapine 100mg qhs, buspirone 10mg TID    MISC:  - DVT ppx: lovenox  - PPI ppx: NA  - DIET: DASH carb consistent, fluid restriction  - Code Status: Full Code       WILLIAN MARY 56y Female  MRN#: [id number]   CODE STATUS:FULL      SUBJECTIVE  Patient is a 56y old Female who presents with a chief complaint of CHF exacerbation (16 Jun 2021 21:25)  Currently admitted to medicine with the primary diagnosis of CHF exacerbation    Patient examined at bedside. No events overnight. Willing to have cardiac cath. Had stress test showing ischemia.      OBJECTIVE  PAST MEDICAL & SURGICAL HISTORY  Hypertension    Hyperlipidemia    Anxiety and depression    COPD, severe    CHF (congestive heart failure)    Cerebrovascular accident (CVA)  Multiple    Type 2 diabetes mellitus    CKD (chronic kidney disease), stage II    No significant past surgical history      ALLERGIES:  No Known Allergies    MEDICATIONS:  STANDING MEDICATIONS  albuterol/ipratropium for Nebulization. 3 milliLiter(s) Nebulizer once  aMIOdarone Infusion 1 mG/Min IV Continuous <Continuous>  aMIOdarone Infusion 0.5 mG/Min IV Continuous <Continuous>  aspirin  chewable 81 milliGRAM(s) Oral daily  atorvastatin 80 milliGRAM(s) Oral at bedtime  budesonide 160 MICROgram(s)/formoterol 4.5 MICROgram(s) Inhaler 2 Puff(s) Inhalation two times a day  busPIRone 10 milliGRAM(s) Oral three times a day  chlorhexidine 4% Liquid 1 Application(s) Topical <User Schedule>  clopidogrel Tablet 75 milliGRAM(s) Oral daily  dextrose 40% Gel 15 Gram(s) Oral once  dextrose 5%. 1000 milliLiter(s) IV Continuous <Continuous>  dextrose 5%. 1000 milliLiter(s) IV Continuous <Continuous>  dextrose 50% Injectable 25 Gram(s) IV Push once  dextrose 50% Injectable 12.5 Gram(s) IV Push once  dextrose 50% Injectable 25 Gram(s) IV Push once  diltiazem    Tablet 30 milliGRAM(s) Oral every 6 hours  enoxaparin Injectable 40 milliGRAM(s) SubCutaneous daily  glucagon  Injectable 1 milliGRAM(s) IntraMuscular once  insulin glargine Injectable (LANTUS) 12 Unit(s) SubCutaneous at bedtime  insulin lispro (ADMELOG) corrective regimen sliding scale   SubCutaneous three times a day before meals  insulin lispro Injectable (ADMELOG) 4 Unit(s) SubCutaneous three times a day before meals  magnesium oxide 400 milliGRAM(s) Oral three times a day with meals  metoprolol succinate ER 50 milliGRAM(s) Oral daily  polyethylene glycol 3350 17 Gram(s) Oral two times a day  QUEtiapine 100 milliGRAM(s) Oral at bedtime  risperiDONE   Tablet 1 milliGRAM(s) Oral daily  risperiDONE   Tablet 3 milliGRAM(s) Oral at bedtime  sacubitril 24 mG/valsartan 26 mG 1 Tablet(s) Oral two times a day  senna 2 Tablet(s) Oral at bedtime  torsemide 20 milliGRAM(s) Oral two times a day    PRN MEDICATIONS  bisacodyl Suppository 10 milliGRAM(s) Rectal daily PRN      VITAL SIGNS: Last 24 Hours  T(C): 36 (17 Jun 2021 08:00), Max: 36.2 (16 Jun 2021 13:40)  T(F): 96.8 (17 Jun 2021 08:00), Max: 97.2 (16 Jun 2021 13:40)  HR: 71 (17 Jun 2021 08:00) (71 - 108)  BP: 136/81 (17 Jun 2021 08:00) (122/71 - 167/68)  BP(mean): --  RR: 18 (17 Jun 2021 06:00) (16 - 18)  SpO2: 95% (16 Jun 2021 22:00) (95% - 95%)    LABS:                        11.7   4.97  )-----------( 262      ( 17 Jun 2021 05:18 )             38.8     06-17    143  |  99  |  22<H>  ----------------------------<  227<H>  3.5   |  31  |  1.0    Ca    8.9      17 Jun 2021 05:18  Mg     1.6     06-17                    RADIOLOGY:      PHYSICAL EXAM:    GENERAL: NAD, well-developed, AAOx3  HEENT:  Atraumatic, Normocephalic. EOMI, PERRLA, conjunctiva and sclera clear, No JVD  PULMONARY: Clear to auscultation bilaterally; No wheeze  CARDIOVASCULAR: Regular rate and rhythm; No murmurs, rubs, or gallops  GASTROINTESTINAL: Soft, Nontender, Nondistended; Bowel sounds present  MUSCULOSKELETAL:  2+ Peripheral Pulses, No clubbing, cyanosis, or edema  NEUROLOGY: non-focal  SKIN: No rashes or lesions      ASSESSMENT & PLAN    Patient is a 56 year old woman with PMH chronic hypoxic respiratory failure (on 4L home O2) secondary to  HFrEF 38%, HTN, HLD, DM2, CORNELIUS on CPAP, CVA with residual LE weakness (2014, wheelchair bound),  COPD, depression who presents of worsening shortness of breath and increase weight with  B/L LE x 2weeks. Admitted for acute on chronic CHF exacerbation.    1) Acute on chronic CHF exacerbation with resolved acute on chronic hypoxic respiratory failure  - Currently on 4L NC (on 4L at home)  - BNP 6/6: 7726  - CXR 6/6: cardiomegaly and bilateral interstitial opacities; repeat CXR 6/15 improving   - Continue torsemide 20mg BID; continue for 10 days and switch to 20mg q24hrs afterward with additional dose only for weight gain >2lbs   - Daily weights/ strict I+O/ fluid restrict  - Continue  metoprolol succinate 200mg q24hrs, Entresto 24-26 BID  - Heart failure consult appreciated   - Echo 6/2021: EF 35-40%, grade III diastolic dysfunction  - Cardiology consult appreciated  - Had stress test 6/16; showing "MODERATE REVERSIBLE DEFECT IN THE SEPTUM, ANTERIOR AND INFERIOR WALL OF THE LEFT VENTRICLE CONSISTENT WITH ISCHEMIA"; cardiology reconsulted    2) Narrow complex tachycardia  -Seems to be Atrial tachycardia   -On metoprolol 200mg q24hrs  -EP consult appreciated  -Started diltiazem 6/14; restarted 6/16--switch to diltiazem 30mg q6hrs (heart failure aware per EP)  -Start amiodarone drip  -On clopidogrel and ASA; may need full AC    3) DM II  - Lantus 12 qhs, lispro 4/4/4  - 6/7/21 A1c 6.5    4) HLD  - Continue atorvastatin 80mg qhs  - Lipid panel: LDL 42, HDL 29, triglycerides 183     5) COPD/pulmonary HTN  - Continue inhalers    6) CORNELIUS/OHS ? on CPAP  - CPAP at night  - Outpatient pulm follow-up    7) CVA with residual LE weakness  - c/w ASA/plavix/statin    8) Depression/ mood disorder  - Continue risperidone 3mg qhs, quetiapine 100mg qhs, buspirone 10mg TID    MISC:  - DVT ppx: lovenox  - PPI ppx: NA  - DIET: DASH carb consistent, fluid restriction  - Code Status: Full Code

## 2021-06-17 NOTE — BEHAVIORAL HEALTH ASSESSMENT NOTE - OTHER
An AID lives at the house See above CVA with residual left side weakness, currently wheelchair  bound

## 2021-06-17 NOTE — BEHAVIORAL HEALTH ASSESSMENT NOTE - NSBHCONSULTFOLLOWAFTERCARE_PSY_A_CORE FT
Patient will  continue follow up with psychiatrist, Dr. Sierra from [facility],  [phone], next scheduled appointment on June 30, 2021. Patient will  continue follow up with psychiatrist, Dr. Sierra from [facility], [phone], next scheduled appointment on June 30, 2021.

## 2021-06-17 NOTE — PROGRESS NOTE ADULT - ASSESSMENT
Acute on chronic systolic HF /fluid overload     Patient remains fluid overloaded on exam   She is in sinus rhythm - on amiodarone gtt   Continue torsemide 20 mg BID - upon discharge, continue with BID for 10 days then 20 mg daily and second 20 mg only as needed for weight gain of 2 lb in one day.  Increase metoprolol XL 50 mg to BID  Decrease Diltiazem to 30 mg Q8hrs   Continue Entresto 24-26 mg BID  Maintain potassium >4.0, Mg >2.1  Strict intake and output  Ischemic work up recommended - patient refusing   EP following   PT evaluation   Daily weight   Discussed with primary team

## 2021-06-17 NOTE — BEHAVIORAL HEALTH ASSESSMENT NOTE - REMOTE MEMORY
Normal Scribe Attestation (For Scribes USE Only)... Attending Attestation (For Attendings USE Only).../Scribe Attestation (For Scribes USE Only)...

## 2021-06-17 NOTE — BEHAVIORAL HEALTH ASSESSMENT NOTE - NSBHCHARTREVIEWLAB_PSY_A_CORE FT
11.7   4.97  )-----------( 262      ( 17 Jun 2021 05:18 )             38.8   06-17    143  |  99  |  22<H>  ----------------------------<  227<H>  3.5   |  31  |  1.0    Ca    8.9      17 Jun 2021 05:18  Mg     1.6     06-17    Thyroid Stimulating Hormone, Serum in AM (06.15.21 @ 06:15)   Thyroid Stimulating Hormone, Serum: 2.57 uIU/mL
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
## 2021-06-17 NOTE — BEHAVIORAL HEALTH ASSESSMENT NOTE - NSBHCHARTREVIEWIMAGING_PSY_A_CORE FT
< from: Xray Chest 1 View AP/PA (06.14.21 @ 14:57) >    EXAM:  XR CHEST 1 VIEW            PROCEDURE DATE:  06/14/2021            INTERPRETATION:  Clinical History/Reason for Exam:  SOB    Comparison: Chest x-ray-6/7/2021.      Findings:    Technique/Positioning:  Frontal view the chest.    Support devices:  none    Cardiac/mediastinum/hilum: Stable cardiomegaly    Lung parenchyma/ Pleura: Diminishing bilateral opacities. No pneumothorax      Skeleton/soft tissues: Stable degenerative changes      Impression:    Stable cardiomegaly.    Diminishing bilateral opacities.    < end of copied text >

## 2021-06-17 NOTE — PROGRESS NOTE ADULT - ASSESSMENT
55 yo F PMHx HTN, DM II, HFrEF, COPD on 4L O2, CVA with residual LE weakness and depression came to ED for worsening SOB and LE edema for the last two weeks. She was not taking her Lasix as it was not prescribe into her blister pack.   In the ED, Chest X-ray showed pulmonary vascular congestion and cardiomegaly. BNP elevated. Started on IV Lasix with resolution of exerbation. Pt now with atrial tachycardia    Acute on chronic hypoxic respiratory failure due to Acute on chronic HFrEF  - resolved  - Echo LVEF 38%, grade II diastolic dysfunction, mild to mod MR, mild to mod TR, mild AR, mod Pulm HTN.   - s/p Lasix 40mg IV BID, switched to Torsemide 20mg BID continue Metoprolol XL 200mg mg Daily and, switch Lisinopril to Losartan 100mg daily.   - Cardiology recommended ischemic work up, send Nuclear stress test as pt declined caths--prelimary report shows likely area of ischemia. Follow up official report. Discussed prelim report with pt-she still thinks she wants to defer cath despite discussing risks--pt states she wants to go home because her bipolar disorder makes it too overwhelming to hear these issues regarding her health. AFter further discussion she is agreeable to hear what cardiology has to offer and is willing to discuss with psychiatry.   - spke with cardiology office, awaiting cardio follow up  - Daily weight, fluid restriction 1.5L /day, low sodium diet.     Atrial Tachycardia.   - remains intermittently tachycardiac, EP to follow up--recommended cardioversion--pt unsure if she wants to under go it  - c/w amio    Chronic Respiratory failure/COPD  - Continue Symbicort, continue DuoNeb prn.     History of CVA with residual LE weakness  - Continue ASA, Lipitor     HTN   Continue losartan, Lasix and Metoprolol.    DM II  - FS is controlled, A1C 6.8, continue Lantus 12 units and Pre-meals Lispro.     Depression   Continue Wellbutrin and Risperidone.    #Progress Note Handoff:  Pending (specify): cardiology evalaution, EP follow up, psych eval  Family discussion:  need to continue Iv diuresis, daily weight. Left message for pt's brother  Disposition: Home possible in 24 hrs with home care.
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
## 2021-06-17 NOTE — PROGRESS NOTE ADULT - SUBJECTIVE AND OBJECTIVE BOX
CHIEF COMPLAINT:    Patient is a 56y old  Female who presents with a chief complaint of CHF exacerbation     INTERVAL HPI/OVERNIGHT EVENTS:    Patient seen and examined at bedside. No acute overnight events occurred.    ROS: Denies sob, chest pain. All other systems are negative.    Vital Signs:    T(F): 98.1 (21 @ 13:35), Max: 98.1 (21 @ 13:35)  HR: 73 (21 @ 13:35) (71 - 76)  BP: 126/75 (21 @ 13:35) (117/71 - 137/70)  RR: 18 (21 @ 13:35) (16 - 18)  SpO2: 95% (21 @ 22:00) (95% - 95%)  I&O's Summary    2021 07:01  -  2021 07:00  --------------------------------------------------------  IN: 560 mL / OUT: 1875 mL / NET: -1315 mL    2021 07:01  -  2021 14:10  --------------------------------------------------------  IN: 200 mL / OUT: 1600 mL / NET: -1400 mL      Daily     Daily Weight in k.4 (2021 04:00)  CAPILLARY BLOOD GLUCOSE      POCT Blood Glucose.: 151 mg/dL (2021 11:36)  POCT Blood Glucose.: 140 mg/dL (2021 07:49)  POCT Blood Glucose.: 163 mg/dL (2021 21:31)  POCT Blood Glucose.: 149 mg/dL (2021 16:24)      PHYSICAL EXAM:  GENERAL:  NAD  SKIN: No rashes or lesions  HEENT: Atraumatic. Normocephalic. Anicteric  NECK:  No JVD.   PULMONARY: Clear to ausculation bilaterally. No wheezing. No rales  CVS: Normal S1, S2. Regular rate and rhythm. No murmurs.  ABDOMEN/GI: Soft, Nontender, Nondistended; Bowel sounds are present  EXTREMITIES:  No edema B/L LE.  NEUROLOGIC:  No motor deficit.  PSYCH: Alert & oriented x 3, normal affect    Consultant(s) Notes Reviewed:  [x ] YES  [ ] NO  Care Discussed with Consultants/Other Providers [ x] YES  [ ] NO - cardiology office    LABS:                        11.7   4.97  )-----------( 262      ( 2021 05:18 )             38.8     06-17    143  |  99  |  22<H>  ----------------------------<  227<H>  3.5   |  31  |  1.0    Ca    8.9      2021 05:18  Mg     1.6               Trop <0.01, CKMB --, CK --, 06-15- @ 06:15        RADIOLOGY & ADDITIONAL TESTS:  Imaging or report Personally Reviewed:  [ ] YES  [ ] NO    Telemetry reviewed independently - wide cpmplex tachycardia    Medications:  Standing  albuterol/ipratropium for Nebulization. 3 milliLiter(s) Nebulizer once  aMIOdarone Infusion 1 mG/Min IV Continuous <Continuous>  aMIOdarone Infusion 0.5 mG/Min IV Continuous <Continuous>  aspirin  chewable 81 milliGRAM(s) Oral daily  atorvastatin 80 milliGRAM(s) Oral at bedtime  budesonide 160 MICROgram(s)/formoterol 4.5 MICROgram(s) Inhaler 2 Puff(s) Inhalation two times a day  busPIRone 10 milliGRAM(s) Oral three times a day  chlorhexidine 4% Liquid 1 Application(s) Topical <User Schedule>  clopidogrel Tablet 75 milliGRAM(s) Oral daily  dextrose 40% Gel 15 Gram(s) Oral once  dextrose 5%. 1000 milliLiter(s) IV Continuous <Continuous>  dextrose 5%. 1000 milliLiter(s) IV Continuous <Continuous>  dextrose 50% Injectable 25 Gram(s) IV Push once  dextrose 50% Injectable 12.5 Gram(s) IV Push once  dextrose 50% Injectable 25 Gram(s) IV Push once  diltiazem    Tablet 30 milliGRAM(s) Oral every 6 hours  enoxaparin Injectable 40 milliGRAM(s) SubCutaneous daily  glucagon  Injectable 1 milliGRAM(s) IntraMuscular once  insulin glargine Injectable (LANTUS) 12 Unit(s) SubCutaneous at bedtime  insulin lispro (ADMELOG) corrective regimen sliding scale   SubCutaneous three times a day before meals  insulin lispro Injectable (ADMELOG) 4 Unit(s) SubCutaneous three times a day before meals  magnesium oxide 400 milliGRAM(s) Oral three times a day with meals  metoprolol succinate ER 50 milliGRAM(s) Oral daily  polyethylene glycol 3350 17 Gram(s) Oral two times a day  QUEtiapine 100 milliGRAM(s) Oral at bedtime  risperiDONE   Tablet 1 milliGRAM(s) Oral daily  risperiDONE   Tablet 3 milliGRAM(s) Oral at bedtime  sacubitril 24 mG/valsartan 26 mG 1 Tablet(s) Oral two times a day  senna 2 Tablet(s) Oral at bedtime  torsemide 20 milliGRAM(s) Oral two times a day    PRN Meds  bisacodyl Suppository 10 milliGRAM(s) Rectal daily PRN      Case discussed with resident  Care discussed with pt

## 2021-06-17 NOTE — BEHAVIORAL HEALTH ASSESSMENT NOTE - NSBHCHARTREVIEWINVESTIGATE_PSY_A_CORE FT
< from: 12 Lead ECG (06.15.21 @ 07:50) >      Ventricular Rate 62 BPM    Atrial Rate 62 BPM    P-R Interval 232 ms    QRS Duration 148 ms    Q-T Interval 500 ms    QTC Calculation(Bazett) 507 ms    P Axis 43 degrees    R Axis 198 degrees    T Axis 108 degrees    Diagnosis Line Sinus rhythm with 1st degree A-V block  Possible Left atrial enlargement  Non-specific intra-ventricular conduction block  Possible Lateral infarct , age undetermined  Abnormal ECG    Confirmed by ABRAHAN CRUZ, CARLOTA ([id number]) on 6/15/2021 8:22:58 AM    < end of copied text >

## 2021-06-17 NOTE — BEHAVIORAL HEALTH ASSESSMENT NOTE - NSBHCHARTREVIEWVS_PSY_A_CORE FT
ICU Vital Signs Last 24 Hrs  T(C): 36 (17 Jun 2021 08:00), Max: 36.2 (16 Jun 2021 13:40)  T(F): 96.8 (17 Jun 2021 08:00), Max: 97.2 (16 Jun 2021 13:40)  HR: 72 (17 Jun 2021 10:00) (71 - 108)  BP: 117/71 (17 Jun 2021 10:00) (117/71 - 167/68)  BP(mean): --  ABP: --  ABP(mean): --  RR: 18 (17 Jun 2021 06:00) (16 - 18)  SpO2: 95% (16 Jun 2021 22:00) (95% - 95%)

## 2021-06-18 LAB
ALBUMIN SERPL ELPH-MCNC: 3.6 G/DL — SIGNIFICANT CHANGE UP (ref 3.5–5.2)
ALP SERPL-CCNC: 88 U/L — SIGNIFICANT CHANGE UP (ref 30–115)
ALT FLD-CCNC: 10 U/L — SIGNIFICANT CHANGE UP (ref 0–41)
ANION GAP SERPL CALC-SCNC: 11 MMOL/L — SIGNIFICANT CHANGE UP (ref 7–14)
AST SERPL-CCNC: 15 U/L — SIGNIFICANT CHANGE UP (ref 0–41)
BASOPHILS # BLD AUTO: 0.09 K/UL — SIGNIFICANT CHANGE UP (ref 0–0.2)
BASOPHILS NFR BLD AUTO: 1.6 % — HIGH (ref 0–1)
BILIRUB SERPL-MCNC: 0.5 MG/DL — SIGNIFICANT CHANGE UP (ref 0.2–1.2)
BUN SERPL-MCNC: 20 MG/DL — SIGNIFICANT CHANGE UP (ref 10–20)
CALCIUM SERPL-MCNC: 9.2 MG/DL — SIGNIFICANT CHANGE UP (ref 8.5–10.1)
CHLORIDE SERPL-SCNC: 99 MMOL/L — SIGNIFICANT CHANGE UP (ref 98–110)
CO2 SERPL-SCNC: 32 MMOL/L — SIGNIFICANT CHANGE UP (ref 17–32)
CREAT SERPL-MCNC: 0.9 MG/DL — SIGNIFICANT CHANGE UP (ref 0.7–1.5)
EOSINOPHIL # BLD AUTO: 0.16 K/UL — SIGNIFICANT CHANGE UP (ref 0–0.7)
EOSINOPHIL NFR BLD AUTO: 2.8 % — SIGNIFICANT CHANGE UP (ref 0–8)
GLUCOSE BLDC GLUCOMTR-MCNC: 158 MG/DL — HIGH (ref 70–99)
GLUCOSE BLDC GLUCOMTR-MCNC: 172 MG/DL — HIGH (ref 70–99)
GLUCOSE BLDC GLUCOMTR-MCNC: 208 MG/DL — HIGH (ref 70–99)
GLUCOSE BLDC GLUCOMTR-MCNC: 94 MG/DL — SIGNIFICANT CHANGE UP (ref 70–99)
GLUCOSE SERPL-MCNC: 160 MG/DL — HIGH (ref 70–99)
HCT VFR BLD CALC: 39.6 % — SIGNIFICANT CHANGE UP (ref 37–47)
HGB BLD-MCNC: 12.2 G/DL — SIGNIFICANT CHANGE UP (ref 12–16)
IMM GRANULOCYTES NFR BLD AUTO: 0.4 % — HIGH (ref 0.1–0.3)
LYMPHOCYTES # BLD AUTO: 1.56 K/UL — SIGNIFICANT CHANGE UP (ref 1.2–3.4)
LYMPHOCYTES # BLD AUTO: 27.7 % — SIGNIFICANT CHANGE UP (ref 20.5–51.1)
MAGNESIUM SERPL-MCNC: 1.8 MG/DL — SIGNIFICANT CHANGE UP (ref 1.8–2.4)
MCHC RBC-ENTMCNC: 27.1 PG — SIGNIFICANT CHANGE UP (ref 27–31)
MCHC RBC-ENTMCNC: 30.8 G/DL — LOW (ref 32–37)
MCV RBC AUTO: 87.8 FL — SIGNIFICANT CHANGE UP (ref 81–99)
MONOCYTES # BLD AUTO: 0.37 K/UL — SIGNIFICANT CHANGE UP (ref 0.1–0.6)
MONOCYTES NFR BLD AUTO: 6.6 % — SIGNIFICANT CHANGE UP (ref 1.7–9.3)
NEUTROPHILS # BLD AUTO: 3.43 K/UL — SIGNIFICANT CHANGE UP (ref 1.4–6.5)
NEUTROPHILS NFR BLD AUTO: 60.9 % — SIGNIFICANT CHANGE UP (ref 42.2–75.2)
NRBC # BLD: 0 /100 WBCS — SIGNIFICANT CHANGE UP (ref 0–0)
PLATELET # BLD AUTO: 276 K/UL — SIGNIFICANT CHANGE UP (ref 130–400)
POTASSIUM SERPL-MCNC: 3.8 MMOL/L — SIGNIFICANT CHANGE UP (ref 3.5–5)
POTASSIUM SERPL-SCNC: 3.8 MMOL/L — SIGNIFICANT CHANGE UP (ref 3.5–5)
PROT SERPL-MCNC: 6.3 G/DL — SIGNIFICANT CHANGE UP (ref 6–8)
RBC # BLD: 4.51 M/UL — SIGNIFICANT CHANGE UP (ref 4.2–5.4)
RBC # FLD: 17.3 % — HIGH (ref 11.5–14.5)
SODIUM SERPL-SCNC: 142 MMOL/L — SIGNIFICANT CHANGE UP (ref 135–146)
WBC # BLD: 5.63 K/UL — SIGNIFICANT CHANGE UP (ref 4.8–10.8)
WBC # FLD AUTO: 5.63 K/UL — SIGNIFICANT CHANGE UP (ref 4.8–10.8)

## 2021-06-18 PROCEDURE — 93010 ELECTROCARDIOGRAM REPORT: CPT

## 2021-06-18 PROCEDURE — 99233 SBSQ HOSP IP/OBS HIGH 50: CPT

## 2021-06-18 PROCEDURE — 99232 SBSQ HOSP IP/OBS MODERATE 35: CPT

## 2021-06-18 RX ORDER — METOPROLOL TARTRATE 50 MG
50 TABLET ORAL
Refills: 0 | Status: DISCONTINUED | OUTPATIENT
Start: 2021-06-18 | End: 2021-06-22

## 2021-06-18 RX ORDER — DIVALPROEX SODIUM 500 MG/1
250 TABLET, DELAYED RELEASE ORAL AT BEDTIME
Refills: 0 | Status: DISCONTINUED | OUTPATIENT
Start: 2021-06-18 | End: 2021-06-22

## 2021-06-18 RX ORDER — DIVALPROEX SODIUM 500 MG/1
250 TABLET, DELAYED RELEASE ORAL DAILY
Refills: 0 | Status: DISCONTINUED | OUTPATIENT
Start: 2021-06-18 | End: 2021-06-22

## 2021-06-18 RX ORDER — AMIODARONE HYDROCHLORIDE 400 MG/1
200 TABLET ORAL
Refills: 0 | Status: DISCONTINUED | OUTPATIENT
Start: 2021-06-18 | End: 2021-06-22

## 2021-06-18 RX ORDER — DILTIAZEM HCL 120 MG
30 CAPSULE, EXT RELEASE 24 HR ORAL EVERY 8 HOURS
Refills: 0 | Status: DISCONTINUED | OUTPATIENT
Start: 2021-06-18 | End: 2021-06-20

## 2021-06-18 RX ADMIN — ATORVASTATIN CALCIUM 80 MILLIGRAM(S): 80 TABLET, FILM COATED ORAL at 21:30

## 2021-06-18 RX ADMIN — AMIODARONE HYDROCHLORIDE 200 MILLIGRAM(S): 400 TABLET ORAL at 09:15

## 2021-06-18 RX ADMIN — Medication 0.5 MILLIGRAM(S): at 13:18

## 2021-06-18 RX ADMIN — SACUBITRIL AND VALSARTAN 1 TABLET(S): 24; 26 TABLET, FILM COATED ORAL at 06:16

## 2021-06-18 RX ADMIN — Medication 30 MILLIGRAM(S): at 13:19

## 2021-06-18 RX ADMIN — ENOXAPARIN SODIUM 40 MILLIGRAM(S): 100 INJECTION SUBCUTANEOUS at 12:02

## 2021-06-18 RX ADMIN — Medication 2: at 11:54

## 2021-06-18 RX ADMIN — AMIODARONE HYDROCHLORIDE 200 MILLIGRAM(S): 400 TABLET ORAL at 17:22

## 2021-06-18 RX ADMIN — Medication 20 MILLIGRAM(S): at 06:16

## 2021-06-18 RX ADMIN — CHLORHEXIDINE GLUCONATE 1 APPLICATION(S): 213 SOLUTION TOPICAL at 06:16

## 2021-06-18 RX ADMIN — Medication 4 UNIT(S): at 07:55

## 2021-06-18 RX ADMIN — Medication 30 MILLIGRAM(S): at 21:30

## 2021-06-18 RX ADMIN — Medication 20 MILLIGRAM(S): at 17:23

## 2021-06-18 RX ADMIN — MAGNESIUM OXIDE 400 MG ORAL TABLET 400 MILLIGRAM(S): 241.3 TABLET ORAL at 11:57

## 2021-06-18 RX ADMIN — SACUBITRIL AND VALSARTAN 1 TABLET(S): 24; 26 TABLET, FILM COATED ORAL at 17:23

## 2021-06-18 RX ADMIN — CLOPIDOGREL BISULFATE 75 MILLIGRAM(S): 75 TABLET, FILM COATED ORAL at 11:57

## 2021-06-18 RX ADMIN — Medication 30 MILLIGRAM(S): at 07:02

## 2021-06-18 RX ADMIN — Medication 50 MILLIGRAM(S): at 17:23

## 2021-06-18 RX ADMIN — Medication 1 MILLIGRAM(S): at 21:33

## 2021-06-18 RX ADMIN — Medication 81 MILLIGRAM(S): at 11:58

## 2021-06-18 RX ADMIN — DIVALPROEX SODIUM 250 MILLIGRAM(S): 500 TABLET, DELAYED RELEASE ORAL at 21:30

## 2021-06-18 RX ADMIN — INSULIN GLARGINE 12 UNIT(S): 100 INJECTION, SOLUTION SUBCUTANEOUS at 21:30

## 2021-06-18 RX ADMIN — Medication 50 MILLIGRAM(S): at 06:16

## 2021-06-18 RX ADMIN — MAGNESIUM OXIDE 400 MG ORAL TABLET 400 MILLIGRAM(S): 241.3 TABLET ORAL at 17:23

## 2021-06-18 RX ADMIN — Medication 30 MILLIGRAM(S): at 02:00

## 2021-06-18 RX ADMIN — MAGNESIUM OXIDE 400 MG ORAL TABLET 400 MILLIGRAM(S): 241.3 TABLET ORAL at 07:56

## 2021-06-18 RX ADMIN — BUDESONIDE AND FORMOTEROL FUMARATE DIHYDRATE 2 PUFF(S): 160; 4.5 AEROSOL RESPIRATORY (INHALATION) at 07:56

## 2021-06-18 RX ADMIN — Medication 1: at 07:55

## 2021-06-18 RX ADMIN — Medication 4 UNIT(S): at 11:54

## 2021-06-18 RX ADMIN — DIVALPROEX SODIUM 250 MILLIGRAM(S): 500 TABLET, DELAYED RELEASE ORAL at 12:03

## 2021-06-18 NOTE — PROGRESS NOTE BEHAVIORAL HEALTH - NSBHCHARTREVIEWLAB_PSY_A_CORE FT
12.2   5.63  )-----------( 276      ( 18 Jun 2021 06:03 )             39.6   06-18    142  |  99  |  20  ----------------------------<  160<H>  3.8   |  32  |  0.9    Ca    9.2      18 Jun 2021 06:03  Mg     1.8     06-18    TPro  6.3  /  Alb  3.6  /  TBili  0.5  /  DBili  x   /  AST  15  /  ALT  10  /  AlkPhos  88  06-18      Thyroid Stimulating Hormone, Serum: 2.57 uIU/mL (06.15.21 @ 06:15) ; `

## 2021-06-18 NOTE — PROGRESS NOTE BEHAVIORAL HEALTH - NSBHCHARTREVIEWINVESTIGATE_PSY_A_CORE FT
< from: 12 Lead ECG (06.18.21 @ 08:15) >      Ventricular Rate 76 BPM    Atrial Rate 76 BPM    P-R Interval 212 ms    QRS Duration 154 ms    Q-T Interval 484 ms    QTC Calculation(Bazett) 544 ms    P Axis 46 degrees    R Axis 237 degrees    T Axis 76 degrees    Diagnosis Line Sinus rhythm [id number] degree A-V block  Possible Left atrial enlargement  Non-specific intra-ventricular conduction block  Possible Lateral infarct , age undetermined  Abnormal ECG    Confirmed by CARLOTA GAUTHIER MD ([id number]) on 6/18/2021 9:35:12 AM    < end of copied text >

## 2021-06-18 NOTE — PROGRESS NOTE ADULT - SUBJECTIVE AND OBJECTIVE BOX
Interval History:          HISTORY OF PRESENT ILLNESS:     56 year old female with a pmhx of chronic hypoxic respiratory failure (on 4L home O2) secondary to  HFrEF 38% (last exacerbation in ) , HTN, HLD, DM2, active smoker , CVA with residual LE weakness (, wheelchair bound),  COPD , mood /depression,  presents of worsening shortness of breath and increase weight with  B/L LE x 2weeks. Patient mentions that she hasn't been taking Lasix for the past two weeks as she ran out of it. Reports she is non compliant with fluid intake. Denies any chest pain, nausea, vomiting, urinary symptoms, fever, chills. Of note patient received 1 st dose of Moderna two weeks, due for 2nd shot on .    Patient reports worsening SOB for the past few weeks, now feeling better after diuresis. Patient endorses non-compliance with low sodium diet and was not aware she did not receive her Lasix on her medication package from the pharmacy. Patient denies c/p, palpitations, headaches, bleeding, LH, dizziness, orthopnea, PND, LOC, cough, feeling bloated, N/V/D.        PAST MEDICAL & SURGICAL HISTORY:    Hypertension  Hyperlipidemia  Anxiety and depression  COPD, severe  CHF (congestive heart failure)  Cerebrovascular accident (CVA) Multiple  Type 2 diabetes mellitus  CKD (chronic kidney disease), stage II    No significant past surgical history        FAMILY HISTORY:    No pertinent family history of premature cardiovascular disease in first degree relatives.  Mother:  of cancer at 65  Father:  of an overdose at 50    SOCIAL HISTORY:    Smokes at least a cigarette a day, denies alcohol or drug use, lives alone    Allergies    No Known Allergies    Intolerances      PHYSICAL EXAM:    General Appearance: well appearing, normal for age and gender. 	  Neck: normal JVP, no bruit.   Cardiovascular: regular rate and rhythm S1 S2, + JVD, No murmurs, 1+le edema  Respiratory: Lungs clear to auscultation	  Psychiatry: Alert and oriented x 3, Mood & affect appropriate  Gastrointestinal:  Soft, Non-tender  Musculoskeletal/ extremities: Normal range of motion, No clubbing, cyanosis ble edema  Vascular: Peripheral pulses palpable 2+ bilaterally      PREVIOUS DIAGNOSTIC TESTING:      TTE     Summary:   1. Moderately decreased segmental left ventricular systolic function.   2. LV Ejection Fraction by Urena's Method with a biplane EF of 38 %.   3. Spectral Doppler shows pseudonormal pattern of left ventricular myocardial filling (Grade IIdiastolic dysfunction).   4. Mild to moderate mitral valve regurgitation.   5. The mitral valve leaflets are tethered which is due to reduced systolic function and elevated LVDP.   6. Mild-moderate tricuspid regurgitation.   7. Mild aortic regurgitation.   8. Sclerotic aortic valve with normal opening.   9. Estimated pulmonary artery systolic pressure is 59.7 mmHg assuming a right atrial pressure of 15 mmHg, which is consistent with moderate pulmonary hypertension.      Home Medications:  Albuterol (Eqv-ProAir HFA) 90 mcg/inh inhalation aerosol: 2 puff(s) inhaled every 6 hours, As Needed (2021 11:22)  aspirin 81 mg oral tablet: 1 tab(s) orally once a day (:)  BuSpar 10 mg oral tablet: 1 tab(s) orally 3 times a day (:)  fenofibrate 145 mg oral tablet: 1 tab(s) orally once a day (:22)  glipiZIDE 10 mg oral tablet: 1 tab(s) orally 2 times a day (:22)  Lipitor 40 mg oral tablet: 1 tab(s) orally once a day (:22)  lisinopril 40 mg oral tablet: 1 tab(s) orally once a day (:22)  metFORMIN 1000 mg oral tablet: 1 tab(s) orally 2 times a day (2021 11:22)  Metoprolol Tartrate 50 mg oral tablet: 1 tab(s) orally 2 times a day (:22)  Norvasc 10 mg oral tablet: 1 tab(s) orally once a day (:22)  Plavix 75 mg oral tablet: 1 tab(s) orally once a day (2021 11:22)  SEROquel 100 mg oral tablet: 2 tab(s) orally once a day (at bedtime) (:)  Tresiba 100 units/mL subcutaneous solution: 50 unit(s) subcutaneous once a day (2021 11:22)    MEDICATIONS  (STANDING):  aspirin  chewable 81 milliGRAM(s) Oral daily  atorvastatin 80 milliGRAM(s) Oral at bedtime  budesonide 160 MICROgram(s)/formoterol 4.5 MICROgram(s) Inhaler 2 Puff(s) Inhalation two times a day  busPIRone 10 milliGRAM(s) Oral three times a day  chlorhexidine 4% Liquid 1 Application(s) Topical <User Schedule>  clopidogrel Tablet 75 milliGRAM(s) Oral daily  dextrose 40% Gel 15 Gram(s) Oral once  dextrose 5%. 1000 milliLiter(s) (50 mL/Hr) IV Continuous <Continuous>  dextrose 5%. 1000 milliLiter(s) (100 mL/Hr) IV Continuous <Continuous>  dextrose 50% Injectable 25 Gram(s) IV Push once  dextrose 50% Injectable 12.5 Gram(s) IV Push once  dextrose 50% Injectable 25 Gram(s) IV Push once  enoxaparin Injectable 40 milliGRAM(s) SubCutaneous daily  furosemide   Injectable 40 milliGRAM(s) IV Push every 12 hours  glucagon  Injectable 1 milliGRAM(s) IntraMuscular once  insulin glargine Injectable (LANTUS) 12 Unit(s) SubCutaneous at bedtime  insulin lispro (ADMELOG) corrective regimen sliding scale   SubCutaneous three times a day before meals  insulin lispro Injectable (ADMELOG) 4 Unit(s) SubCutaneous three times a day before meals  lisinopril 40 milliGRAM(s) Oral daily  metoprolol succinate  milliGRAM(s) Oral daily  polyethylene glycol 3350 17 Gram(s) Oral two times a day  QUEtiapine 100 milliGRAM(s) Oral at bedtime  risperiDONE   Tablet 1 milliGRAM(s) Oral daily  risperiDONE   Tablet 3 milliGRAM(s) Oral at bedtime  senna 2 Tablet(s) Oral at bedtime    MEDICATIONS  (PRN):  bisacodyl Suppository 10 milliGRAM(s) Rectal daily PRN Constipation           Interval History: Patient remains fluid overloaded, HR controlled on amiodarone gtt.         HISTORY OF PRESENT ILLNESS:     56 year old female with a PMHx of chronic hypoxic respiratory failure (on 4L home O2) secondary to  HFrEF 38% (last exacerbation in ) , HTN, HLD, DM2, active smoker , CVA with residual LE weakness (2014, wheelchair bound),  COPD , mood /depression,  presents of worsening shortness of breath and increase weight with  B/L LE x 2weeks. Patient mentions that she hasn't been taking Lasix for the past two weeks as she ran out of it. Reports she is non compliant with fluid intake. Denies any chest pain, nausea, vomiting, urinary symptoms, fever, chills. Of note patient received 1 st dose of Moderna two weeks, due for 2nd shot on .    Patient reports worsening SOB for the past few weeks, now feeling better after diuresis. Patient endorses non-compliance with low sodium diet and was not aware she did not receive her Lasix on her medication package from the pharmacy. Patient denies c/p, palpitations, headaches, bleeding, LH, dizziness, orthopnea, PND, LOC, cough, feeling bloated, N/V/D.        PAST MEDICAL & SURGICAL HISTORY:    Hypertension  Hyperlipidemia  Anxiety and depression  COPD, severe  CHF (congestive heart failure)  Cerebrovascular accident (CVA) Multiple  Type 2 diabetes mellitus  CKD (chronic kidney disease), stage II    No significant past surgical history        FAMILY HISTORY:    No pertinent family history of premature cardiovascular disease in first degree relatives.  Mother:  of cancer at 65  Father:  of an overdose at 50    SOCIAL HISTORY:    Smokes at least a cigarette a day, denies alcohol or drug use, lives alone    Allergies    No Known Allergies    Intolerances      PHYSICAL EXAM:    General Appearance: well appearing, normal for age and gender. 	  Neck: normal JVP, no bruit.   Cardiovascular: regular rate and rhythm S1 S2, + JVD, No murmurs, 1+le edema  Respiratory: Lungs clear to auscultation	  Psychiatry: Alert and oriented x 3, Mood & affect appropriate  Gastrointestinal:  Soft, Non-tender  Musculoskeletal/ extremities: Normal range of motion, No clubbing, cyanosis ble edema  Vascular: Peripheral pulses palpable 2+ bilaterally      PREVIOUS DIAGNOSTIC TESTING:      TTE     Summary:   1. Moderately decreased segmental left ventricular systolic function.   2. LV Ejection Fraction by Urena's Method with a biplane EF of 38 %.   3. Spectral Doppler shows pseudonormal pattern of left ventricular myocardial filling (Grade IIdiastolic dysfunction).   4. Mild to moderate mitral valve regurgitation.   5. The mitral valve leaflets are tethered which is due to reduced systolic function and elevated LVDP.   6. Mild-moderate tricuspid regurgitation.   7. Mild aortic regurgitation.   8. Sclerotic aortic valve with normal opening.   9. Estimated pulmonary artery systolic pressure is 59.7 mmHg assuming a right atrial pressure of 15 mmHg, which is consistent with moderate pulmonary hypertension.      Home Medications:  Albuterol (Eqv-ProAir HFA) 90 mcg/inh inhalation aerosol: 2 puff(s) inhaled every 6 hours, As Needed (2021 11:)  aspirin 81 mg oral tablet: 1 tab(s) orally once a day (:)  BuSpar 10 mg oral tablet: 1 tab(s) orally 3 times a day (:)  fenofibrate 145 mg oral tablet: 1 tab(s) orally once a day (:)  glipiZIDE 10 mg oral tablet: 1 tab(s) orally 2 times a day (:22)  Lipitor 40 mg oral tablet: 1 tab(s) orally once a day (:22)  lisinopril 40 mg oral tablet: 1 tab(s) orally once a day (:22)  metFORMIN 1000 mg oral tablet: 1 tab(s) orally 2 times a day (2021 11:)  Metoprolol Tartrate 50 mg oral tablet: 1 tab(s) orally 2 times a day (:22)  Norvasc 10 mg oral tablet: 1 tab(s) orally once a day (2021 11:22)  Plavix 75 mg oral tablet: 1 tab(s) orally once a day (:)  SEROquel 100 mg oral tablet: 2 tab(s) orally once a day (at bedtime) (:)  Tresiba 100 units/mL subcutaneous solution: 50 unit(s) subcutaneous once a day (2021 11:22)    MEDICATIONS  (STANDING):  aspirin  chewable 81 milliGRAM(s) Oral daily  atorvastatin 80 milliGRAM(s) Oral at bedtime  budesonide 160 MICROgram(s)/formoterol 4.5 MICROgram(s) Inhaler 2 Puff(s) Inhalation two times a day  busPIRone 10 milliGRAM(s) Oral three times a day  chlorhexidine 4% Liquid 1 Application(s) Topical <User Schedule>  clopidogrel Tablet 75 milliGRAM(s) Oral daily  dextrose 40% Gel 15 Gram(s) Oral once  dextrose 5%. 1000 milliLiter(s) (50 mL/Hr) IV Continuous <Continuous>  dextrose 5%. 1000 milliLiter(s) (100 mL/Hr) IV Continuous <Continuous>  dextrose 50% Injectable 25 Gram(s) IV Push once  dextrose 50% Injectable 12.5 Gram(s) IV Push once  dextrose 50% Injectable 25 Gram(s) IV Push once  enoxaparin Injectable 40 milliGRAM(s) SubCutaneous daily  furosemide   Injectable 40 milliGRAM(s) IV Push every 12 hours  glucagon  Injectable 1 milliGRAM(s) IntraMuscular once  insulin glargine Injectable (LANTUS) 12 Unit(s) SubCutaneous at bedtime  insulin lispro (ADMELOG) corrective regimen sliding scale   SubCutaneous three times a day before meals  insulin lispro Injectable (ADMELOG) 4 Unit(s) SubCutaneous three times a day before meals  lisinopril 40 milliGRAM(s) Oral daily  metoprolol succinate  milliGRAM(s) Oral daily  polyethylene glycol 3350 17 Gram(s) Oral two times a day  QUEtiapine 100 milliGRAM(s) Oral at bedtime  risperiDONE   Tablet 1 milliGRAM(s) Oral daily  risperiDONE   Tablet 3 milliGRAM(s) Oral at bedtime  senna 2 Tablet(s) Oral at bedtime    MEDICATIONS  (PRN):  bisacodyl Suppository 10 milliGRAM(s) Rectal daily PRN Constipation

## 2021-06-18 NOTE — PROGRESS NOTE ADULT - SUBJECTIVE AND OBJECTIVE BOX
WILLIAN MARY 56y Female  MRN#: [id number]   CODE STATUS:________      SUBJECTIVE  Patient is a 56y old Female who presents with a chief complaint of CHF exacerbation (17 Jun 2021 16:02)  Currently admitted to medicine with the primary diagnosis of CHF exacerbation    Patient examined at bedside. Today stating she doesn't want catheterization, wants to go home--but yesterday was agreeable. Pending cardiology follow-up.    OBJECTIVE  PAST MEDICAL & SURGICAL HISTORY  Hypertension    Hyperlipidemia    Anxiety and depression    COPD, severe    CHF (congestive heart failure)    Cerebrovascular accident (CVA)  Multiple    Type 2 diabetes mellitus    CKD (chronic kidney disease), stage II    No significant past surgical history      ALLERGIES:  No Known Allergies    MEDICATIONS:  STANDING MEDICATIONS  albuterol/ipratropium for Nebulization. 3 milliLiter(s) Nebulizer once  aMIOdarone    Tablet 200 milliGRAM(s) Oral two times a day  aMIOdarone Infusion 1 mG/Min IV Continuous <Continuous>  aMIOdarone Infusion 0.5 mG/Min IV Continuous <Continuous>  aspirin  chewable 81 milliGRAM(s) Oral daily  atorvastatin 80 milliGRAM(s) Oral at bedtime  budesonide 160 MICROgram(s)/formoterol 4.5 MICROgram(s) Inhaler 2 Puff(s) Inhalation two times a day  chlorhexidine 4% Liquid 1 Application(s) Topical <User Schedule>  clopidogrel Tablet 75 milliGRAM(s) Oral daily  dextrose 40% Gel 15 Gram(s) Oral once  dextrose 5%. 1000 milliLiter(s) IV Continuous <Continuous>  dextrose 5%. 1000 milliLiter(s) IV Continuous <Continuous>  dextrose 50% Injectable 25 Gram(s) IV Push once  dextrose 50% Injectable 12.5 Gram(s) IV Push once  dextrose 50% Injectable 25 Gram(s) IV Push once  diltiazem    Tablet 30 milliGRAM(s) Oral every 8 hours  enoxaparin Injectable 40 milliGRAM(s) SubCutaneous daily  glucagon  Injectable 1 milliGRAM(s) IntraMuscular once  insulin glargine Injectable (LANTUS) 12 Unit(s) SubCutaneous at bedtime  insulin lispro (ADMELOG) corrective regimen sliding scale   SubCutaneous three times a day before meals  insulin lispro Injectable (ADMELOG) 4 Unit(s) SubCutaneous three times a day before meals  LORazepam     Tablet 1 milliGRAM(s) Oral at bedtime  magnesium oxide 400 milliGRAM(s) Oral three times a day with meals  metoprolol succinate ER 50 milliGRAM(s) Oral two times a day  polyethylene glycol 3350 17 Gram(s) Oral two times a day  sacubitril 24 mG/valsartan 26 mG 1 Tablet(s) Oral two times a day  senna 2 Tablet(s) Oral at bedtime  torsemide 20 milliGRAM(s) Oral two times a day    PRN MEDICATIONS  bisacodyl Suppository 10 milliGRAM(s) Rectal daily PRN  LORazepam     Tablet 1 milliGRAM(s) Oral at bedtime PRN      VITAL SIGNS: Last 24 Hours  T(C): 36.7 (18 Jun 2021 07:50), Max: 36.7 (17 Jun 2021 13:35)  T(F): 98.1 (18 Jun 2021 07:50), Max: 98.1 (17 Jun 2021 13:35)  HR: 67 (18 Jun 2021 07:50) (67 - 76)  BP: 130/82 (18 Jun 2021 07:50) (115/66 - 142/61)  BP(mean): --  RR: 18 (18 Jun 2021 07:50) (18 - 18)  SpO2: 96% (18 Jun 2021 08:07) (95% - 98%)    LABS:                        12.2   5.63  )-----------( 276      ( 18 Jun 2021 06:03 )             39.6     06-18    142  |  99  |  20  ----------------------------<  160<H>  3.8   |  32  |  0.9    Ca    9.2      18 Jun 2021 06:03  Mg     1.8     06-18    TPro  6.3  /  Alb  3.6  /  TBili  0.5  /  DBili  x   /  AST  15  /  ALT  10  /  AlkPhos  88  06-18        PHYSICAL EXAM:    GENERAL: NAD, well-developed, AAOx3  HEENT:  Atraumatic, Normocephalic. EOMI, PERRLA, conjunctiva and sclera clear, No JVD  PULMONARY: Clear to auscultation bilaterally; No wheeze  CARDIOVASCULAR: Regular rate and rhythm; No murmurs, rubs, or gallops  GASTROINTESTINAL: Soft, Nontender, Nondistended; Bowel sounds present  MUSCULOSKELETAL:  2+ Peripheral Pulses, No clubbing, cyanosis, or edema  NEUROLOGY: non-focal  SKIN: No rashes or lesions      ASSESSMENT & PLAN    Patient is a 56 year old woman with PMH chronic hypoxic respiratory failure (on 4L home O2) secondary to  HFrEF 38%, HTN, HLD, DM2, CORNELIUS on CPAP, CVA with residual LE weakness (2014, wheelchair bound),  COPD, depression who presents of worsening shortness of breath and increase weight with  B/L LE x 2weeks. Admitted for acute on chronic CHF exacerbation.    1) Acute on chronic CHF exacerbation with resolved acute on chronic hypoxic respiratory failure  - Currently on 4L NC (on 4L at home)  - BNP 6/6: 7726  - CXR 6/6: cardiomegaly and bilateral interstitial opacities; repeat CXR 6/15 improving   - Continue torsemide 20mg BID; continue for 10 days and switch to 20mg q24hrs afterward with additional dose only for weight gain >2lbs   - Daily weights/ strict I+O/ fluid restrict  - Increase metoprolol succinate 50 BID  - Decrease diltiazem 30mg q8hrs   - Continue Entresto 24-26 BID  - Heart failure consult appreciated   - Echo 6/2021: EF 35-40%, grade III diastolic dysfunction  - Cardiology consult appreciated  - Had stress test 6/16; showing "MODERATE REVERSIBLE DEFECT IN THE SEPTUM, ANTERIOR AND INFERIOR WALL OF THE LEFT VENTRICLE CONSISTENT WITH ISCHEMIA"; cardiology reconsulted    2) Narrow complex tachycardia  -Seems to be Atrial tachycardia   -On metoprolol succinate 50mg BID, diltiazem 30mg q8hrs  -Switch amiodarone drip to amiodarone 200mg BID  -EP consult appreciated  -On clopidogrel and ASA; may need full AC    3) DM II  - Lantus 12 qhs, lispro 4/4/4  - 6/7/21 A1c 6.5    4) HLD  - Continue atorvastatin 80mg qhs  - Lipid panel: LDL 42, HDL 29, triglycerides 183     5) COPD/pulmonary HTN  - Continue inhalers    6) CORNELIUS/OHS ? on CPAP  - CPAP at night  - Outpatient pulm follow-up    7) CVA with residual LE weakness  - c/w ASA/plavix/statin    8) Depression/ mood disorder  - Continue risperidone 3mg qhs, quetiapine 100mg qhs, buspirone 10mg TID    MISC:  - DVT ppx: lovenox  - PPI ppx: NA  - DIET: DASH carb consistent, fluid restriction  - Code Status: Full Code

## 2021-06-18 NOTE — PROGRESS NOTE ADULT - ASSESSMENT
Acute on chronic systolic HF /fluid overload     Patient remains fluid overloaded on exam   Continue torsemide 20 mg BID - upon discharge, continue with BID for 10 days then 20 mg daily and second 20 mg only as needed for weight gain of 2 lb in one day.  Continue Metoprolol XL 50 mg to BID  Continue Diltiazem to 30 mg Q8hrs   Continue Entresto 24-26 mg BID  Maintain potassium >4.0, Mg >2.1  Strict intake and output  Ischemic work up recommended - patient refusing   EP following   PT evaluation   Daily weight   Discussed with primary team      Acute on chronic systolic HF /fluid overload     Patient remains fluid overloaded on exam   Continue torsemide 20 mg BID - upon discharge, continue with BID for 10 days then 20 mg daily and second 20 mg only as needed for weight gain of 2 lb in one day.  Increase Metoprolol XL to 100 mg BID  DC Diltiazem    Switch amiodarone to PO  Continue Entresto 24-26 mg BID  Maintain potassium >4.0, Mg >2.1  Strict intake and output  Patient supposedly had LHC at [facility]   EP following   PT evaluation   Daily weight   DC planning from HF standpoint   Discussed with primary team

## 2021-06-18 NOTE — PROGRESS NOTE ADULT - ASSESSMENT
57 yo F PMHx HTN, DM II, HFrEF, COPD on 4L O2, CVA with residual LE weakness and depression came to ED for worsening SOB and LE edema for the last two weeks. She was not taking her Lasix as it was not prescribe into her blister pack.   In the ED, Chest X-ray showed pulmonary vascular congestion and cardiomegaly. BNP elevated. Started on IV Lasix with resolution of exerbation. Pt now with atrial tachycardia    Acute on chronic hypoxic respiratory failure due to Acute on chronic HFrEF  - resolved  - Echo LVEF 38%, grade II diastolic dysfunction, mild to mod MR, mild to mod TR, mild AR, mod Pulm HTN.   - s/p Lasix 40mg IV BID, switched to Torsemide 20mg BID continue Metoprolol XL 200mg mg Daily and, switch Lisinopril to Losartan 100mg daily.   - spke with cardiology office, awaiting cardio follow up  - Daily weight, fluid restriction 1.5L /day, low sodium diet.   -stress test shows area of ischemia, EF <20%.   - awaiting cardiology follow up for cath/life vest  - pt initially declining cath but is now agreeable    Atrial Tachycardia.   - pt placed on amiodarone infusion overnight, now on PO  - c/w cardizem (HF team aware and agreeable despite significantly impaired EF)    Chronic Respiratory failure/COPD  - Continue Symbicort, continue DuoNeb prn.     History of CVA with residual LE weakness  - Continue ASA, Lipitor     HTN   Continue losartan, Lasix and Metoprolol.    DM II  - FS is controlled, A1C 6.8, continue Lantus 12 units and Pre-meals Lispro.     Depression   Continue Wellbutrin and Risperidone.    Pt is very high risk of malignant cardiac arrythmia, cardiac arrest. Requires continued telemetry monitoring     #Progress Note Handoff:  Pending (specify): cardiology evaluation cath, lifevest  Family discussion:  need to continue Iv diuresis, daily weight. Left message for pt's brother with call back. Pt told me not to try to call again today  Disposition: Home with home care

## 2021-06-18 NOTE — CHART NOTE - NSCHARTNOTEFT_GEN_A_CORE
Patient refuses cardiac cath again and states she wants to leave the hospital today to "take care of some things."

## 2021-06-18 NOTE — PROGRESS NOTE ADULT - SUBJECTIVE AND OBJECTIVE BOX
CHIEF COMPLAINT:    Patient is a 56y old  Female who presents with a chief complaint of CHF exacerbation     INTERVAL HPI/OVERNIGHT EVENTS:    Patient seen and examined at bedside. No acute overnight events occurred.    ROS: Denies chest pain, SOB. All other systems are negative.    Vital Signs:    T(F): 97.2 (06-18-21 @ 12:07), Max: 98.1 (06-17-21 @ 13:35)  HR: 74 (06-18-21 @ 12:07) (67 - 76)  BP: 139/86 (06-18-21 @ 12:07) (115/66 - 142/61)  RR: 18 (06-18-21 @ 12:07) (18 - 18)  SpO2: 96% (06-18-21 @ 08:07) (95% - 98%)  I&O's Summary    17 Jun 2021 07:01  -  18 Jun 2021 07:00  --------------------------------------------------------  IN: 686.8 mL / OUT: 3000 mL / NET: -2313.2 mL      POCT Blood Glucose.: 208 mg/dL (18 Jun 2021 11:10)  POCT Blood Glucose.: 158 mg/dL (18 Jun 2021 07:11)  POCT Blood Glucose.: 124 mg/dL (17 Jun 2021 21:31)  POCT Blood Glucose.: 222 mg/dL (17 Jun 2021 16:43)      PHYSICAL EXAM:  GENERAL:  NAD  SKIN: No rashes or lesions  HEENT: Atraumatic. Normocephalic. Anicteric  NECK:  No JVD.   PULMONARY: Clear to ausculation bilaterally. No wheezing. No rales  CVS: Normal S1, S2. Regular rate and rhythm. No murmurs.  ABDOMEN/GI: Soft, Nontender, Nondistended; Bowel sounds are present  EXTREMITIES:  No edema B/L LE.  NEUROLOGIC:  No motor deficit.  PSYCH: Alert & oriented x 3, normal affect    Consultant(s) Notes Reviewed:  [x ] YES  [ ] NO  Care Discussed with Consultants/Other Providers [ x] YES  [ ] NO - cardiology, psychiatry    LABS:                        12.2   5.63  )-----------( 276      ( 18 Jun 2021 06:03 )             39.6     06-18    142  |  99  |  20  ----------------------------<  160<H>  3.8   |  32  |  0.9    Ca    9.2      18 Jun 2021 06:03  Mg     1.8     06-18    TPro  6.3  /  Alb  3.6  /  TBili  0.5  /  DBili  x   /  AST  15  /  ALT  10  /  AlkPhos  88  06-18    RADIOLOGY & ADDITIONAL TESTS:  Imaging or report Personally Reviewed:  [ ] YES  [ ] NO    Telemetry reviewed independently - tachycardia    Medications:  Standing  albuterol/ipratropium for Nebulization. 3 milliLiter(s) Nebulizer once  aMIOdarone    Tablet 200 milliGRAM(s) Oral two times a day  aMIOdarone Infusion 1 mG/Min IV Continuous <Continuous>  aMIOdarone Infusion 0.5 mG/Min IV Continuous <Continuous>  aspirin  chewable 81 milliGRAM(s) Oral daily  atorvastatin 80 milliGRAM(s) Oral at bedtime  budesonide 160 MICROgram(s)/formoterol 4.5 MICROgram(s) Inhaler 2 Puff(s) Inhalation two times a day  chlorhexidine 4% Liquid 1 Application(s) Topical <User Schedule>  clopidogrel Tablet 75 milliGRAM(s) Oral daily  dextrose 40% Gel 15 Gram(s) Oral once  dextrose 5%. 1000 milliLiter(s) IV Continuous <Continuous>  dextrose 5%. 1000 milliLiter(s) IV Continuous <Continuous>  dextrose 50% Injectable 25 Gram(s) IV Push once  dextrose 50% Injectable 12.5 Gram(s) IV Push once  dextrose 50% Injectable 25 Gram(s) IV Push once  diltiazem    Tablet 30 milliGRAM(s) Oral every 8 hours  diVALproex  milliGRAM(s) Oral at bedtime  diVALproex  milliGRAM(s) Oral daily  enoxaparin Injectable 40 milliGRAM(s) SubCutaneous daily  glucagon  Injectable 1 milliGRAM(s) IntraMuscular once  insulin glargine Injectable (LANTUS) 12 Unit(s) SubCutaneous at bedtime  insulin lispro (ADMELOG) corrective regimen sliding scale   SubCutaneous three times a day before meals  insulin lispro Injectable (ADMELOG) 4 Unit(s) SubCutaneous three times a day before meals  LORazepam     Tablet 0.5 milliGRAM(s) Oral <User Schedule>  LORazepam     Tablet 1 milliGRAM(s) Oral at bedtime  magnesium oxide 400 milliGRAM(s) Oral three times a day with meals  metoprolol succinate ER 50 milliGRAM(s) Oral two times a day  polyethylene glycol 3350 17 Gram(s) Oral two times a day  sacubitril 24 mG/valsartan 26 mG 1 Tablet(s) Oral two times a day  senna 2 Tablet(s) Oral at bedtime  torsemide 20 milliGRAM(s) Oral two times a day    PRN Meds  bisacodyl Suppository 10 milliGRAM(s) Rectal daily PRN  LORazepam     Tablet 1 milliGRAM(s) Oral at bedtime PRN      Case discussed with resident  Care discussed with pt

## 2021-06-19 LAB
ALBUMIN SERPL ELPH-MCNC: 3.5 G/DL — SIGNIFICANT CHANGE UP (ref 3.5–5.2)
ALP SERPL-CCNC: 92 U/L — SIGNIFICANT CHANGE UP (ref 30–115)
ALT FLD-CCNC: 11 U/L — SIGNIFICANT CHANGE UP (ref 0–41)
ANION GAP SERPL CALC-SCNC: 11 MMOL/L — SIGNIFICANT CHANGE UP (ref 7–14)
AST SERPL-CCNC: 16 U/L — SIGNIFICANT CHANGE UP (ref 0–41)
BASOPHILS # BLD AUTO: 0.09 K/UL — SIGNIFICANT CHANGE UP (ref 0–0.2)
BASOPHILS NFR BLD AUTO: 1.6 % — HIGH (ref 0–1)
BILIRUB SERPL-MCNC: 0.6 MG/DL — SIGNIFICANT CHANGE UP (ref 0.2–1.2)
BUN SERPL-MCNC: 18 MG/DL — SIGNIFICANT CHANGE UP (ref 10–20)
CALCIUM SERPL-MCNC: 9.2 MG/DL — SIGNIFICANT CHANGE UP (ref 8.5–10.1)
CHLORIDE SERPL-SCNC: 97 MMOL/L — LOW (ref 98–110)
CO2 SERPL-SCNC: 30 MMOL/L — SIGNIFICANT CHANGE UP (ref 17–32)
CREAT SERPL-MCNC: 0.8 MG/DL — SIGNIFICANT CHANGE UP (ref 0.7–1.5)
EOSINOPHIL # BLD AUTO: 0.18 K/UL — SIGNIFICANT CHANGE UP (ref 0–0.7)
EOSINOPHIL NFR BLD AUTO: 3.2 % — SIGNIFICANT CHANGE UP (ref 0–8)
GLUCOSE BLDC GLUCOMTR-MCNC: 106 MG/DL — HIGH (ref 70–99)
GLUCOSE BLDC GLUCOMTR-MCNC: 121 MG/DL — HIGH (ref 70–99)
GLUCOSE BLDC GLUCOMTR-MCNC: 144 MG/DL — HIGH (ref 70–99)
GLUCOSE BLDC GLUCOMTR-MCNC: 226 MG/DL — HIGH (ref 70–99)
GLUCOSE SERPL-MCNC: 120 MG/DL — HIGH (ref 70–99)
HCT VFR BLD CALC: 39.5 % — SIGNIFICANT CHANGE UP (ref 37–47)
HGB BLD-MCNC: 12.1 G/DL — SIGNIFICANT CHANGE UP (ref 12–16)
IMM GRANULOCYTES NFR BLD AUTO: 0.5 % — HIGH (ref 0.1–0.3)
LYMPHOCYTES # BLD AUTO: 1.74 K/UL — SIGNIFICANT CHANGE UP (ref 1.2–3.4)
LYMPHOCYTES # BLD AUTO: 31 % — SIGNIFICANT CHANGE UP (ref 20.5–51.1)
MAGNESIUM SERPL-MCNC: 1.7 MG/DL — LOW (ref 1.8–2.4)
MCHC RBC-ENTMCNC: 26.9 PG — LOW (ref 27–31)
MCHC RBC-ENTMCNC: 30.6 G/DL — LOW (ref 32–37)
MCV RBC AUTO: 87.8 FL — SIGNIFICANT CHANGE UP (ref 81–99)
MONOCYTES # BLD AUTO: 0.4 K/UL — SIGNIFICANT CHANGE UP (ref 0.1–0.6)
MONOCYTES NFR BLD AUTO: 7.1 % — SIGNIFICANT CHANGE UP (ref 1.7–9.3)
NEUTROPHILS # BLD AUTO: 3.18 K/UL — SIGNIFICANT CHANGE UP (ref 1.4–6.5)
NEUTROPHILS NFR BLD AUTO: 56.6 % — SIGNIFICANT CHANGE UP (ref 42.2–75.2)
NRBC # BLD: 0 /100 WBCS — SIGNIFICANT CHANGE UP (ref 0–0)
PLATELET # BLD AUTO: 273 K/UL — SIGNIFICANT CHANGE UP (ref 130–400)
POTASSIUM SERPL-MCNC: 3.6 MMOL/L — SIGNIFICANT CHANGE UP (ref 3.5–5)
POTASSIUM SERPL-SCNC: 3.6 MMOL/L — SIGNIFICANT CHANGE UP (ref 3.5–5)
PROT SERPL-MCNC: 6.4 G/DL — SIGNIFICANT CHANGE UP (ref 6–8)
RBC # BLD: 4.5 M/UL — SIGNIFICANT CHANGE UP (ref 4.2–5.4)
RBC # FLD: 17.1 % — HIGH (ref 11.5–14.5)
SARS-COV-2 RNA SPEC QL NAA+PROBE: SIGNIFICANT CHANGE UP
SODIUM SERPL-SCNC: 138 MMOL/L — SIGNIFICANT CHANGE UP (ref 135–146)
WBC # BLD: 5.62 K/UL — SIGNIFICANT CHANGE UP (ref 4.8–10.8)
WBC # FLD AUTO: 5.62 K/UL — SIGNIFICANT CHANGE UP (ref 4.8–10.8)

## 2021-06-19 PROCEDURE — 99232 SBSQ HOSP IP/OBS MODERATE 35: CPT

## 2021-06-19 RX ADMIN — Medication 30 MILLIGRAM(S): at 21:13

## 2021-06-19 RX ADMIN — Medication 81 MILLIGRAM(S): at 12:10

## 2021-06-19 RX ADMIN — Medication 0.5 MILLIGRAM(S): at 06:05

## 2021-06-19 RX ADMIN — Medication 4 UNIT(S): at 12:08

## 2021-06-19 RX ADMIN — DIVALPROEX SODIUM 250 MILLIGRAM(S): 500 TABLET, DELAYED RELEASE ORAL at 21:12

## 2021-06-19 RX ADMIN — MAGNESIUM OXIDE 400 MG ORAL TABLET 400 MILLIGRAM(S): 241.3 TABLET ORAL at 12:10

## 2021-06-19 RX ADMIN — SACUBITRIL AND VALSARTAN 1 TABLET(S): 24; 26 TABLET, FILM COATED ORAL at 06:05

## 2021-06-19 RX ADMIN — CLOPIDOGREL BISULFATE 75 MILLIGRAM(S): 75 TABLET, FILM COATED ORAL at 12:10

## 2021-06-19 RX ADMIN — AMIODARONE HYDROCHLORIDE 200 MILLIGRAM(S): 400 TABLET ORAL at 06:05

## 2021-06-19 RX ADMIN — SENNA PLUS 2 TABLET(S): 8.6 TABLET ORAL at 21:12

## 2021-06-19 RX ADMIN — CHLORHEXIDINE GLUCONATE 1 APPLICATION(S): 213 SOLUTION TOPICAL at 06:05

## 2021-06-19 RX ADMIN — MAGNESIUM OXIDE 400 MG ORAL TABLET 400 MILLIGRAM(S): 241.3 TABLET ORAL at 17:28

## 2021-06-19 RX ADMIN — INSULIN GLARGINE 12 UNIT(S): 100 INJECTION, SOLUTION SUBCUTANEOUS at 21:54

## 2021-06-19 RX ADMIN — Medication 2: at 12:08

## 2021-06-19 RX ADMIN — Medication 50 MILLIGRAM(S): at 06:05

## 2021-06-19 RX ADMIN — DIVALPROEX SODIUM 250 MILLIGRAM(S): 500 TABLET, DELAYED RELEASE ORAL at 12:10

## 2021-06-19 RX ADMIN — Medication 4 UNIT(S): at 17:29

## 2021-06-19 RX ADMIN — Medication 30 MILLIGRAM(S): at 13:58

## 2021-06-19 RX ADMIN — Medication 0.5 MILLIGRAM(S): at 13:57

## 2021-06-19 RX ADMIN — Medication 1 MILLIGRAM(S): at 21:14

## 2021-06-19 RX ADMIN — AMIODARONE HYDROCHLORIDE 200 MILLIGRAM(S): 400 TABLET ORAL at 17:28

## 2021-06-19 RX ADMIN — Medication 50 MILLIGRAM(S): at 17:28

## 2021-06-19 RX ADMIN — SACUBITRIL AND VALSARTAN 1 TABLET(S): 24; 26 TABLET, FILM COATED ORAL at 17:28

## 2021-06-19 RX ADMIN — ENOXAPARIN SODIUM 40 MILLIGRAM(S): 100 INJECTION SUBCUTANEOUS at 13:58

## 2021-06-19 RX ADMIN — ATORVASTATIN CALCIUM 80 MILLIGRAM(S): 80 TABLET, FILM COATED ORAL at 21:12

## 2021-06-19 RX ADMIN — Medication 4 UNIT(S): at 08:44

## 2021-06-19 RX ADMIN — Medication 20 MILLIGRAM(S): at 17:28

## 2021-06-19 RX ADMIN — Medication 20 MILLIGRAM(S): at 06:05

## 2021-06-19 RX ADMIN — Medication 30 MILLIGRAM(S): at 06:05

## 2021-06-19 RX ADMIN — MAGNESIUM OXIDE 400 MG ORAL TABLET 400 MILLIGRAM(S): 241.3 TABLET ORAL at 08:47

## 2021-06-19 NOTE — PROGRESS NOTE ADULT - SUBJECTIVE AND OBJECTIVE BOX
WILLIAN MARY  56y  Female      Patient is a 56y old  Female who presents with a chief complaint of CHF exacerbation (18 Jun 2021 13:24)      INTERVAL HPI/OVERNIGHT EVENTS:  She feels ok, no chest pain.   Vital Signs Last 24 Hrs  T(C): 36.1 (19 Jun 2021 12:44), Max: 36.5 (18 Jun 2021 20:31)  T(F): 97 (19 Jun 2021 12:44), Max: 97.7 (18 Jun 2021 20:31)  HR: 67 (19 Jun 2021 12:44) (63 - 71)  BP: 143/85 (19 Jun 2021 12:44) (138/82 - 143/85)  BP(mean): --  RR: 17 (19 Jun 2021 12:44) (17 - 18)  SpO2: 94% (18 Jun 2021 19:43) (94% - 94%)      06-18-21 @ 07:01  -  06-19-21 @ 07:00  --------------------------------------------------------  IN: 160 mL / OUT: 2700 mL / NET: -2540 mL    06-19-21 @ 07:01  -  06-19-21 @ 14:41  --------------------------------------------------------  IN: 240 mL / OUT: 1600 mL / NET: -1360 mL            Consultant(s) Notes Reviewed:  [x ] YES  [ ] NO          MEDICATIONS  (STANDING):  albuterol/ipratropium for Nebulization. 3 milliLiter(s) Nebulizer once  aMIOdarone    Tablet 200 milliGRAM(s) Oral two times a day  aMIOdarone Infusion 1 mG/Min (33.3 mL/Hr) IV Continuous <Continuous>  aMIOdarone Infusion 0.5 mG/Min (16.7 mL/Hr) IV Continuous <Continuous>  aspirin  chewable 81 milliGRAM(s) Oral daily  atorvastatin 80 milliGRAM(s) Oral at bedtime  budesonide 160 MICROgram(s)/formoterol 4.5 MICROgram(s) Inhaler 2 Puff(s) Inhalation two times a day  chlorhexidine 4% Liquid 1 Application(s) Topical <User Schedule>  clopidogrel Tablet 75 milliGRAM(s) Oral daily  dextrose 40% Gel 15 Gram(s) Oral once  dextrose 5%. 1000 milliLiter(s) (50 mL/Hr) IV Continuous <Continuous>  dextrose 5%. 1000 milliLiter(s) (100 mL/Hr) IV Continuous <Continuous>  dextrose 50% Injectable 25 Gram(s) IV Push once  dextrose 50% Injectable 12.5 Gram(s) IV Push once  dextrose 50% Injectable 25 Gram(s) IV Push once  diltiazem    Tablet 30 milliGRAM(s) Oral every 8 hours  diVALproex  milliGRAM(s) Oral at bedtime  diVALproex  milliGRAM(s) Oral daily  enoxaparin Injectable 40 milliGRAM(s) SubCutaneous daily  glucagon  Injectable 1 milliGRAM(s) IntraMuscular once  insulin glargine Injectable (LANTUS) 12 Unit(s) SubCutaneous at bedtime  insulin lispro (ADMELOG) corrective regimen sliding scale   SubCutaneous three times a day before meals  insulin lispro Injectable (ADMELOG) 4 Unit(s) SubCutaneous three times a day before meals  LORazepam     Tablet 0.5 milliGRAM(s) Oral <User Schedule>  LORazepam     Tablet 1 milliGRAM(s) Oral at bedtime  magnesium oxide 400 milliGRAM(s) Oral three times a day with meals  metoprolol succinate ER 50 milliGRAM(s) Oral two times a day  polyethylene glycol 3350 17 Gram(s) Oral two times a day  sacubitril 24 mG/valsartan 26 mG 1 Tablet(s) Oral two times a day  senna 2 Tablet(s) Oral at bedtime  torsemide 20 milliGRAM(s) Oral two times a day    MEDICATIONS  (PRN):  bisacodyl Suppository 10 milliGRAM(s) Rectal daily PRN Constipation  LORazepam     Tablet 1 milliGRAM(s) Oral at bedtime PRN Agitation      LABS                          12.1   5.62  )-----------( 273      ( 19 Jun 2021 05:21 )             39.5     06-19    138  |  97<L>  |  18  ----------------------------<  120<H>  3.6   |  30  |  0.8    Ca    9.2      19 Jun 2021 05:21  Mg     1.7     06-19    TPro  6.4  /  Alb  3.5  /  TBili  0.6  /  DBili  x   /  AST  16  /  ALT  11  /  AlkPhos  92  06-19          Lactate Trend        CAPILLARY BLOOD GLUCOSE      POCT Blood Glucose.: 226 mg/dL (19 Jun 2021 11:53)        RADIOLOGY & ADDITIONAL TESTS:    Imaging Personally Reviewed:  [ ] YES  [ ] NO    HEALTH ISSUES - PROBLEM Dx:          PHYSICAL EXAM:  GENERAL: NAD, well-developed.  HEAD:  Atraumatic, Normocephalic.  EYES: EOMI, PERRLA, conjunctiva and sclera clear.  NECK: Supple, JVD  CHEST/LUNG: Bibasilar rales, scattered expiratory wheezing.    HEART: Regular rate and rhythm; S1 S2. Tachycardic.   ABDOMEN: Soft, Nontender, Nondistended; Bowel sounds present.  EXTREMITIES: LE edema 2+,  PSYCH: AAOx3.  NEUROLOGY: non-focal.  SKIN: No rashes or lesions.

## 2021-06-19 NOTE — PROGRESS NOTE ADULT - ASSESSMENT
56 year old female with PMH of HTN, DM 2, HFrEF, COPD on 4L O2, CVA with residual LE weakness and depression came to ED for worsening SOB and LE edema for the last two weeks. She was not taking her Lasix.   In the ED, Chest X-ray shows pulm vascular congestion and cardiomegaly. BNP elevated. Started on IV Lasix. Pt hypoxic to 94% on NC    A/P:   Acute on chronic hypoxic respiratory failure  Acute HFrEF:   Patient with LE edema, SOB , Pro-BNP 7729  CXR showed vascular congestion. Repeated CXR 6/14 showed improved vascular congestion and interstitial edema.   Echo LVEF 38%, grade II diastolic dysfunction, mild to mod MR, mild to mod TR, mild AR, mod Pulm HTN.   s/p Lasix 40mg IV BID, switched to Torsemide 20mg BID, continue Metoprolol 50mg BID and Entresto 24/26 BID.   Stress test showed moderate reversible defect on the septum, inferior and anterior wall of LV.   Cardiology plan for cardiac cath. Patient agreed today  Daily weight, fluid restriction 1.5L /day, low sodium diet.     Atrial Tachycardia.   HR back to Sinus rhythm,   Continue Amiodarone and Cardizem. (cardiology aware about low LVEF).     Chronic Respiratory failure:   COPD  Chronic tobacco abuse:   Continue Symbicort, continue DuoNeb prn.     History of CVA with residual LE weakness  Continue ASA, Lipitor   Magnesium deficiency.    HTN: Continue lisinopril, Lasix and Metoprolol.    DM II: FS is controlled, A1C 6.8, continue Lantus 12 units and Pre-meals Lispro.     Depression: Continue Wellbutrin and Risperidone.    #Progress Note Handoff:  Pending (specify): Cardiac cath.   Family discussion: with patient agreeable with cardiac cath.   Disposition: Home.

## 2021-06-20 LAB
ALBUMIN SERPL ELPH-MCNC: 3.3 G/DL — LOW (ref 3.5–5.2)
ALP SERPL-CCNC: 92 U/L — SIGNIFICANT CHANGE UP (ref 30–115)
ALT FLD-CCNC: 10 U/L — SIGNIFICANT CHANGE UP (ref 0–41)
ANION GAP SERPL CALC-SCNC: 8 MMOL/L — SIGNIFICANT CHANGE UP (ref 7–14)
AST SERPL-CCNC: 15 U/L — SIGNIFICANT CHANGE UP (ref 0–41)
BASOPHILS # BLD AUTO: 0.07 K/UL — SIGNIFICANT CHANGE UP (ref 0–0.2)
BASOPHILS NFR BLD AUTO: 1.4 % — HIGH (ref 0–1)
BILIRUB SERPL-MCNC: 0.5 MG/DL — SIGNIFICANT CHANGE UP (ref 0.2–1.2)
BUN SERPL-MCNC: 20 MG/DL — SIGNIFICANT CHANGE UP (ref 10–20)
CALCIUM SERPL-MCNC: 8.9 MG/DL — SIGNIFICANT CHANGE UP (ref 8.5–10.1)
CHLORIDE SERPL-SCNC: 97 MMOL/L — LOW (ref 98–110)
CO2 SERPL-SCNC: 32 MMOL/L — SIGNIFICANT CHANGE UP (ref 17–32)
CREAT SERPL-MCNC: 0.9 MG/DL — SIGNIFICANT CHANGE UP (ref 0.7–1.5)
EOSINOPHIL # BLD AUTO: 0.14 K/UL — SIGNIFICANT CHANGE UP (ref 0–0.7)
EOSINOPHIL NFR BLD AUTO: 2.7 % — SIGNIFICANT CHANGE UP (ref 0–8)
GLUCOSE BLDC GLUCOMTR-MCNC: 139 MG/DL — HIGH (ref 70–99)
GLUCOSE BLDC GLUCOMTR-MCNC: 161 MG/DL — HIGH (ref 70–99)
GLUCOSE BLDC GLUCOMTR-MCNC: 165 MG/DL — HIGH (ref 70–99)
GLUCOSE BLDC GLUCOMTR-MCNC: 196 MG/DL — HIGH (ref 70–99)
GLUCOSE SERPL-MCNC: 144 MG/DL — HIGH (ref 70–99)
HCT VFR BLD CALC: 38.3 % — SIGNIFICANT CHANGE UP (ref 37–47)
HGB BLD-MCNC: 11.8 G/DL — LOW (ref 12–16)
IMM GRANULOCYTES NFR BLD AUTO: 0.4 % — HIGH (ref 0.1–0.3)
LYMPHOCYTES # BLD AUTO: 1.6 K/UL — SIGNIFICANT CHANGE UP (ref 1.2–3.4)
LYMPHOCYTES # BLD AUTO: 31.4 % — SIGNIFICANT CHANGE UP (ref 20.5–51.1)
MAGNESIUM SERPL-MCNC: 1.7 MG/DL — LOW (ref 1.8–2.4)
MCHC RBC-ENTMCNC: 27.1 PG — SIGNIFICANT CHANGE UP (ref 27–31)
MCHC RBC-ENTMCNC: 30.8 G/DL — LOW (ref 32–37)
MCV RBC AUTO: 88 FL — SIGNIFICANT CHANGE UP (ref 81–99)
MONOCYTES # BLD AUTO: 0.4 K/UL — SIGNIFICANT CHANGE UP (ref 0.1–0.6)
MONOCYTES NFR BLD AUTO: 7.8 % — SIGNIFICANT CHANGE UP (ref 1.7–9.3)
NEUTROPHILS # BLD AUTO: 2.87 K/UL — SIGNIFICANT CHANGE UP (ref 1.4–6.5)
NEUTROPHILS NFR BLD AUTO: 56.3 % — SIGNIFICANT CHANGE UP (ref 42.2–75.2)
NRBC # BLD: 0 /100 WBCS — SIGNIFICANT CHANGE UP (ref 0–0)
PLATELET # BLD AUTO: 273 K/UL — SIGNIFICANT CHANGE UP (ref 130–400)
POTASSIUM SERPL-MCNC: 3.8 MMOL/L — SIGNIFICANT CHANGE UP (ref 3.5–5)
POTASSIUM SERPL-SCNC: 3.8 MMOL/L — SIGNIFICANT CHANGE UP (ref 3.5–5)
PROT SERPL-MCNC: 6.2 G/DL — SIGNIFICANT CHANGE UP (ref 6–8)
RBC # BLD: 4.35 M/UL — SIGNIFICANT CHANGE UP (ref 4.2–5.4)
RBC # FLD: 17 % — HIGH (ref 11.5–14.5)
SODIUM SERPL-SCNC: 137 MMOL/L — SIGNIFICANT CHANGE UP (ref 135–146)
WBC # BLD: 5.1 K/UL — SIGNIFICANT CHANGE UP (ref 4.8–10.8)
WBC # FLD AUTO: 5.1 K/UL — SIGNIFICANT CHANGE UP (ref 4.8–10.8)

## 2021-06-20 PROCEDURE — 99233 SBSQ HOSP IP/OBS HIGH 50: CPT

## 2021-06-20 RX ORDER — SODIUM CHLORIDE 9 MG/ML
1000 INJECTION INTRAMUSCULAR; INTRAVENOUS; SUBCUTANEOUS
Refills: 0 | Status: DISCONTINUED | OUTPATIENT
Start: 2021-06-21 | End: 2021-06-22

## 2021-06-20 RX ADMIN — Medication 50 MILLIGRAM(S): at 06:28

## 2021-06-20 RX ADMIN — Medication 4 UNIT(S): at 12:27

## 2021-06-20 RX ADMIN — AMIODARONE HYDROCHLORIDE 200 MILLIGRAM(S): 400 TABLET ORAL at 17:20

## 2021-06-20 RX ADMIN — Medication 30 MILLIGRAM(S): at 06:28

## 2021-06-20 RX ADMIN — INSULIN GLARGINE 12 UNIT(S): 100 INJECTION, SOLUTION SUBCUTANEOUS at 21:42

## 2021-06-20 RX ADMIN — Medication 4 UNIT(S): at 16:56

## 2021-06-20 RX ADMIN — Medication 1: at 07:57

## 2021-06-20 RX ADMIN — SENNA PLUS 2 TABLET(S): 8.6 TABLET ORAL at 21:34

## 2021-06-20 RX ADMIN — Medication 20 MILLIGRAM(S): at 06:28

## 2021-06-20 RX ADMIN — Medication 81 MILLIGRAM(S): at 12:28

## 2021-06-20 RX ADMIN — BUDESONIDE AND FORMOTEROL FUMARATE DIHYDRATE 2 PUFF(S): 160; 4.5 AEROSOL RESPIRATORY (INHALATION) at 20:18

## 2021-06-20 RX ADMIN — Medication 50 MILLIGRAM(S): at 17:20

## 2021-06-20 RX ADMIN — Medication 20 MILLIGRAM(S): at 17:20

## 2021-06-20 RX ADMIN — SACUBITRIL AND VALSARTAN 1 TABLET(S): 24; 26 TABLET, FILM COATED ORAL at 06:29

## 2021-06-20 RX ADMIN — Medication 0.5 MILLIGRAM(S): at 13:52

## 2021-06-20 RX ADMIN — Medication 0.5 MILLIGRAM(S): at 06:31

## 2021-06-20 RX ADMIN — MAGNESIUM OXIDE 400 MG ORAL TABLET 400 MILLIGRAM(S): 241.3 TABLET ORAL at 12:26

## 2021-06-20 RX ADMIN — SACUBITRIL AND VALSARTAN 1 TABLET(S): 24; 26 TABLET, FILM COATED ORAL at 17:20

## 2021-06-20 RX ADMIN — Medication 4 UNIT(S): at 07:57

## 2021-06-20 RX ADMIN — Medication 1 MILLIGRAM(S): at 21:30

## 2021-06-20 RX ADMIN — DIVALPROEX SODIUM 250 MILLIGRAM(S): 500 TABLET, DELAYED RELEASE ORAL at 21:30

## 2021-06-20 RX ADMIN — AMIODARONE HYDROCHLORIDE 200 MILLIGRAM(S): 400 TABLET ORAL at 06:27

## 2021-06-20 RX ADMIN — CLOPIDOGREL BISULFATE 75 MILLIGRAM(S): 75 TABLET, FILM COATED ORAL at 12:26

## 2021-06-20 RX ADMIN — DIVALPROEX SODIUM 250 MILLIGRAM(S): 500 TABLET, DELAYED RELEASE ORAL at 12:27

## 2021-06-20 RX ADMIN — ENOXAPARIN SODIUM 40 MILLIGRAM(S): 100 INJECTION SUBCUTANEOUS at 12:27

## 2021-06-20 RX ADMIN — MAGNESIUM OXIDE 400 MG ORAL TABLET 400 MILLIGRAM(S): 241.3 TABLET ORAL at 16:55

## 2021-06-20 RX ADMIN — MAGNESIUM OXIDE 400 MG ORAL TABLET 400 MILLIGRAM(S): 241.3 TABLET ORAL at 07:56

## 2021-06-20 RX ADMIN — ATORVASTATIN CALCIUM 80 MILLIGRAM(S): 80 TABLET, FILM COATED ORAL at 21:34

## 2021-06-20 RX ADMIN — CHLORHEXIDINE GLUCONATE 1 APPLICATION(S): 213 SOLUTION TOPICAL at 06:27

## 2021-06-20 RX ADMIN — Medication 1: at 16:56

## 2021-06-20 NOTE — PROGRESS NOTE ADULT - ASSESSMENT
57 yo F PMHx HTN, DM II, HFrEF, COPD on 4L O2, CVA with residual LE weakness and depression came to ED for worsening SOB and LE edema for the last two weeks. She was not taking her Lasix as it was not prescribe into her blister pack.   In the ED, Chest X-ray showed pulmonary vascular congestion and cardiomegaly. BNP elevated. Started on IV Lasix with resolution of exerbation. Pt now with atrial tachycardia    Acute on chronic hypoxic respiratory failure due to Acute on chronic HFrEF  - resolved  - Echo LVEF 38%, grade II diastolic dysfunction, mild to mod MR, mild to mod TR, mild AR, mod Pulm HTN.   - s/p Lasix 40mg IV BID, switched to Torsemide 20mg BID continue Metoprolol XL 200mg mg Daily and, switch Lisinopril to Losartan 100mg daily.   - spke with cardiology office, awaiting cardio follow up  - Daily weight, fluid restriction 1.5L /day, low sodium diet.   -stress test shows area of ischemia, EF <20%.   - cardiac cath tomorrow  - hold lovenox in AM for cath  - npo after midnight    Atrial Tachycardia.   - pt placed on amiodarone infusion overnight, now on PO  - c/w cardizem (HF team aware and agreeable despite significantly impaired EF)    Chronic Respiratory failure/COPD  - Continue Symbicort, continue DuoNeb prn.     History of CVA with residual LE weakness  - Continue ASA, Lipitor     HTN   Continue losartan, Lasix and Metoprolol.    DM II  - FS is controlled, A1C 6.8, continue Lantus 12 units and Pre-meals Lispro.     Depression   Continue Wellbutrin and Risperidone.    Pt is very high risk of malignant cardiac arrythmia, cardiac arrest. Requires continued telemetry monitoring     #Progress Note Handoff:  Pending (specify):  cath,   Family discussion:  discussed cath  Disposition: Home with home care

## 2021-06-20 NOTE — PROGRESS NOTE ADULT - SUBJECTIVE AND OBJECTIVE BOX
CHIEF COMPLAINT:    Patient is a 56y old  Female who presents with a chief complaint of CHF exacerbation     INTERVAL HPI/OVERNIGHT EVENTS:    Patient seen and examined at bedside. No acute overnight events occurred.    ROS: Denies SOB, chest pain. All other systems are negative.    Vital Signs:    T(F): 97.5 (06-20-21 @ 12:54), Max: 98.3 (06-20-21 @ 05:06)  HR: 61 (06-20-21 @ 12:54) (61 - 67)  BP: 141/76 (06-20-21 @ 12:54) (132/71 - 141/76)  RR: 17 (06-20-21 @ 12:54) (16 - 18)  SpO2: --  I&O's Summary    19 Jun 2021 07:01  -  20 Jun 2021 07:00  --------------------------------------------------------  IN: 980 mL / OUT: 2750 mL / NET: -1770 mL    20 Jun 2021 07:01  -  20 Jun 2021 15:44  --------------------------------------------------------  IN: 440 mL / OUT: 900 mL / NET: -460 mL        POCT Blood Glucose.: 139 mg/dL (20 Jun 2021 11:36)  POCT Blood Glucose.: 165 mg/dL (20 Jun 2021 07:30)  POCT Blood Glucose.: 144 mg/dL (19 Jun 2021 21:22)  POCT Blood Glucose.: 106 mg/dL (19 Jun 2021 16:57)      PHYSICAL EXAM:  GENERAL:  NAD  SKIN: No rashes or lesions  HEENT: Atraumatic. Normocephalic. Anicteric  NECK:  No JVD.   PULMONARY: Clear to ausculation bilaterally. No wheezing. No rales  CVS: Normal S1, S2. Regular rate and rhythm. No murmurs.  ABDOMEN/GI: Soft, Nontender, Nondistended; Bowel sounds are present  EXTREMITIES:  No edema B/L LE.  NEUROLOGIC:  No motor deficit.  PSYCH: Alert & oriented x 3, normal affect    Consultant(s) Notes Reviewed:  [x ] YES  [ ] NO      LABS:                        11.8   5.10  )-----------( 273      ( 20 Jun 2021 06:51 )             38.3     06-20    137  |  97<L>  |  20  ----------------------------<  144<H>  3.8   |  32  |  0.9    Ca    8.9      20 Jun 2021 06:51  Mg     1.7     06-20    TPro  6.2  /  Alb  3.3<L>  /  TBili  0.5  /  DBili  x   /  AST  15  /  ALT  10  /  AlkPhos  92  06-20        RADIOLOGY & ADDITIONAL TESTS:  Imaging or report Personally Reviewed:  [ ] YES  [ ] NO    Telemetry reviewed independently - artifact    Medications:  Standing  albuterol/ipratropium for Nebulization. 3 milliLiter(s) Nebulizer once  aMIOdarone    Tablet 200 milliGRAM(s) Oral two times a day  aspirin  chewable 81 milliGRAM(s) Oral daily  atorvastatin 80 milliGRAM(s) Oral at bedtime  budesonide 160 MICROgram(s)/formoterol 4.5 MICROgram(s) Inhaler 2 Puff(s) Inhalation two times a day  chlorhexidine 4% Liquid 1 Application(s) Topical <User Schedule>  clopidogrel Tablet 75 milliGRAM(s) Oral daily  dextrose 40% Gel 15 Gram(s) Oral once  dextrose 5%. 1000 milliLiter(s) IV Continuous <Continuous>  dextrose 5%. 1000 milliLiter(s) IV Continuous <Continuous>  dextrose 50% Injectable 25 Gram(s) IV Push once  dextrose 50% Injectable 12.5 Gram(s) IV Push once  dextrose 50% Injectable 25 Gram(s) IV Push once  diVALproex  milliGRAM(s) Oral at bedtime  diVALproex  milliGRAM(s) Oral daily  enoxaparin Injectable 40 milliGRAM(s) SubCutaneous daily  glucagon  Injectable 1 milliGRAM(s) IntraMuscular once  insulin glargine Injectable (LANTUS) 12 Unit(s) SubCutaneous at bedtime  insulin lispro (ADMELOG) corrective regimen sliding scale   SubCutaneous three times a day before meals  insulin lispro Injectable (ADMELOG) 4 Unit(s) SubCutaneous three times a day before meals  LORazepam     Tablet 0.5 milliGRAM(s) Oral <User Schedule>  LORazepam     Tablet 1 milliGRAM(s) Oral at bedtime  magnesium oxide 400 milliGRAM(s) Oral three times a day with meals  metoprolol succinate ER 50 milliGRAM(s) Oral two times a day  polyethylene glycol 3350 17 Gram(s) Oral two times a day  sacubitril 24 mG/valsartan 26 mG 1 Tablet(s) Oral two times a day  senna 2 Tablet(s) Oral at bedtime  torsemide 20 milliGRAM(s) Oral two times a day    PRN Meds  bisacodyl Suppository 10 milliGRAM(s) Rectal daily PRN  LORazepam     Tablet 1 milliGRAM(s) Oral at bedtime PRN      Case discussed with resident  Care discussed with pt

## 2021-06-20 NOTE — CHART NOTE - NSCHARTNOTEFT_GEN_A_CORE
PRE-CATH NOTE:    Indication: [x] cardiomyopathy [x] positive nuclear stress test    Anti- Anginal medications:                    [ ] not used                       [ ] used                   [ ] not used but strong indication not to use    Ejection Fraction                   [ ] <29            [ x] 30-39%   [ ] 40-49%     [ ]>50%    CHF                   [ x] active (within last 14 days on meds   [ ] Chronic (on meds but no exacerbation)    COPD                   [ ] mild (on chronic bronchodilators)  [ ] moderate (on chronic steroid therapy)      [x ] severe (indication for home O2 or PACO2 >50)    Other risk factors:                       [ ] Previous MI                     [x ] CVA/ stroke                    [ ] carotid stent/ CEA                    [ ] PVD/PAD- (arterial aneurysm, non-palpable pulses, tortuous vessel with inability to insert catheter, infra-renal dissection, renal or subclavian artery stenosis)                    [x ] diabetic                    [ ] previous CABG                    [ ] Renal Failure                           11.8   5.10  )-----------( 273      ( 20 Jun 2021 06:51 )             38.3     06-20    137  |  97<L>  |  20  ----------------------------<  144<H>  3.8   |  32  |  0.9    Ca    8.9      20 Jun 2021 06:51  Mg     1.7     06-20    TPro  6.2  /  Alb  3.3<L>  /  TBili  0.5  /  DBili  x   /  AST  15  /  ALT  10  /  AlkPhos  92  06-20                         -Patient Schedule for LHC/PCI tomorrow as add on  -Keep NPO after midnight  -Hold Anticoagulation prior to PCI  -Start IV Fluids NS at :    1ml/KG for 1 Hr prior to procedure

## 2021-06-21 LAB
ALBUMIN SERPL ELPH-MCNC: 3.3 G/DL — LOW (ref 3.5–5.2)
ALP SERPL-CCNC: 108 U/L — SIGNIFICANT CHANGE UP (ref 30–115)
ALT FLD-CCNC: 9 U/L — SIGNIFICANT CHANGE UP (ref 0–41)
ANION GAP SERPL CALC-SCNC: 8 MMOL/L — SIGNIFICANT CHANGE UP (ref 7–14)
AST SERPL-CCNC: 14 U/L — SIGNIFICANT CHANGE UP (ref 0–41)
BASOPHILS # BLD AUTO: 0.09 K/UL — SIGNIFICANT CHANGE UP (ref 0–0.2)
BASOPHILS NFR BLD AUTO: 1.8 % — HIGH (ref 0–1)
BILIRUB SERPL-MCNC: 0.5 MG/DL — SIGNIFICANT CHANGE UP (ref 0.2–1.2)
BUN SERPL-MCNC: 20 MG/DL — SIGNIFICANT CHANGE UP (ref 10–20)
CALCIUM SERPL-MCNC: 9.1 MG/DL — SIGNIFICANT CHANGE UP (ref 8.5–10.1)
CHLORIDE SERPL-SCNC: 98 MMOL/L — SIGNIFICANT CHANGE UP (ref 98–110)
CO2 SERPL-SCNC: 33 MMOL/L — HIGH (ref 17–32)
CREAT SERPL-MCNC: 0.8 MG/DL — SIGNIFICANT CHANGE UP (ref 0.7–1.5)
EOSINOPHIL # BLD AUTO: 0.19 K/UL — SIGNIFICANT CHANGE UP (ref 0–0.7)
EOSINOPHIL NFR BLD AUTO: 3.8 % — SIGNIFICANT CHANGE UP (ref 0–8)
GLUCOSE BLDC GLUCOMTR-MCNC: 103 MG/DL — HIGH (ref 70–99)
GLUCOSE BLDC GLUCOMTR-MCNC: 124 MG/DL — HIGH (ref 70–99)
GLUCOSE BLDC GLUCOMTR-MCNC: 139 MG/DL — HIGH (ref 70–99)
GLUCOSE BLDC GLUCOMTR-MCNC: 153 MG/DL — HIGH (ref 70–99)
GLUCOSE SERPL-MCNC: 144 MG/DL — HIGH (ref 70–99)
HCT VFR BLD CALC: 38.5 % — SIGNIFICANT CHANGE UP (ref 37–47)
HGB BLD-MCNC: 11.7 G/DL — LOW (ref 12–16)
IMM GRANULOCYTES NFR BLD AUTO: 0.2 % — SIGNIFICANT CHANGE UP (ref 0.1–0.3)
LYMPHOCYTES # BLD AUTO: 1.77 K/UL — SIGNIFICANT CHANGE UP (ref 1.2–3.4)
LYMPHOCYTES # BLD AUTO: 35.6 % — SIGNIFICANT CHANGE UP (ref 20.5–51.1)
MAGNESIUM SERPL-MCNC: 1.7 MG/DL — LOW (ref 1.8–2.4)
MCHC RBC-ENTMCNC: 26.8 PG — LOW (ref 27–31)
MCHC RBC-ENTMCNC: 30.4 G/DL — LOW (ref 32–37)
MCV RBC AUTO: 88.1 FL — SIGNIFICANT CHANGE UP (ref 81–99)
MONOCYTES # BLD AUTO: 0.34 K/UL — SIGNIFICANT CHANGE UP (ref 0.1–0.6)
MONOCYTES NFR BLD AUTO: 6.8 % — SIGNIFICANT CHANGE UP (ref 1.7–9.3)
NEUTROPHILS # BLD AUTO: 2.57 K/UL — SIGNIFICANT CHANGE UP (ref 1.4–6.5)
NEUTROPHILS NFR BLD AUTO: 51.8 % — SIGNIFICANT CHANGE UP (ref 42.2–75.2)
NRBC # BLD: 0 /100 WBCS — SIGNIFICANT CHANGE UP (ref 0–0)
PLATELET # BLD AUTO: 247 K/UL — SIGNIFICANT CHANGE UP (ref 130–400)
POTASSIUM SERPL-MCNC: 3.3 MMOL/L — LOW (ref 3.5–5)
POTASSIUM SERPL-SCNC: 3.3 MMOL/L — LOW (ref 3.5–5)
PROT SERPL-MCNC: 6.2 G/DL — SIGNIFICANT CHANGE UP (ref 6–8)
RBC # BLD: 4.37 M/UL — SIGNIFICANT CHANGE UP (ref 4.2–5.4)
RBC # FLD: 16.9 % — HIGH (ref 11.5–14.5)
SODIUM SERPL-SCNC: 139 MMOL/L — SIGNIFICANT CHANGE UP (ref 135–146)
WBC # BLD: 4.97 K/UL — SIGNIFICANT CHANGE UP (ref 4.8–10.8)
WBC # FLD AUTO: 4.97 K/UL — SIGNIFICANT CHANGE UP (ref 4.8–10.8)

## 2021-06-21 PROCEDURE — 92928 PRQ TCAT PLMT NTRAC ST 1 LES: CPT | Mod: RC

## 2021-06-21 PROCEDURE — 92978 ENDOLUMINL IVUS OCT C 1ST: CPT | Mod: 26,RC

## 2021-06-21 PROCEDURE — 99232 SBSQ HOSP IP/OBS MODERATE 35: CPT

## 2021-06-21 PROCEDURE — 99233 SBSQ HOSP IP/OBS HIGH 50: CPT

## 2021-06-21 PROCEDURE — 93010 ELECTROCARDIOGRAM REPORT: CPT

## 2021-06-21 PROCEDURE — 93458 L HRT ARTERY/VENTRICLE ANGIO: CPT | Mod: 26,XU

## 2021-06-21 RX ORDER — AMIODARONE HYDROCHLORIDE 400 MG/1
1 TABLET ORAL
Qty: 30 | Refills: 0
Start: 2021-06-21 | End: 2021-07-20

## 2021-06-21 RX ORDER — POTASSIUM CHLORIDE 20 MEQ
20 PACKET (EA) ORAL ONCE
Refills: 0 | Status: COMPLETED | OUTPATIENT
Start: 2021-06-21 | End: 2021-06-21

## 2021-06-21 RX ORDER — MAGNESIUM SULFATE 500 MG/ML
2 VIAL (ML) INJECTION ONCE
Refills: 0 | Status: COMPLETED | OUTPATIENT
Start: 2021-06-21 | End: 2021-06-21

## 2021-06-21 RX ADMIN — CLOPIDOGREL BISULFATE 75 MILLIGRAM(S): 75 TABLET, FILM COATED ORAL at 11:41

## 2021-06-21 RX ADMIN — Medication 50 GRAM(S): at 17:38

## 2021-06-21 RX ADMIN — Medication 81 MILLIGRAM(S): at 11:41

## 2021-06-21 RX ADMIN — SACUBITRIL AND VALSARTAN 1 TABLET(S): 24; 26 TABLET, FILM COATED ORAL at 17:39

## 2021-06-21 RX ADMIN — SACUBITRIL AND VALSARTAN 1 TABLET(S): 24; 26 TABLET, FILM COATED ORAL at 05:17

## 2021-06-21 RX ADMIN — Medication 0.5 MILLIGRAM(S): at 17:38

## 2021-06-21 RX ADMIN — Medication 20 MILLIGRAM(S): at 17:37

## 2021-06-21 RX ADMIN — SENNA PLUS 2 TABLET(S): 8.6 TABLET ORAL at 21:44

## 2021-06-21 RX ADMIN — Medication 20 MILLIGRAM(S): at 05:15

## 2021-06-21 RX ADMIN — AMIODARONE HYDROCHLORIDE 200 MILLIGRAM(S): 400 TABLET ORAL at 05:14

## 2021-06-21 RX ADMIN — DIVALPROEX SODIUM 250 MILLIGRAM(S): 500 TABLET, DELAYED RELEASE ORAL at 21:44

## 2021-06-21 RX ADMIN — Medication 50 MILLIGRAM(S): at 17:41

## 2021-06-21 RX ADMIN — Medication 1 MILLIGRAM(S): at 21:44

## 2021-06-21 RX ADMIN — AMIODARONE HYDROCHLORIDE 200 MILLIGRAM(S): 400 TABLET ORAL at 17:37

## 2021-06-21 RX ADMIN — ATORVASTATIN CALCIUM 80 MILLIGRAM(S): 80 TABLET, FILM COATED ORAL at 21:44

## 2021-06-21 RX ADMIN — Medication 0.5 MILLIGRAM(S): at 05:20

## 2021-06-21 RX ADMIN — CHLORHEXIDINE GLUCONATE 1 APPLICATION(S): 213 SOLUTION TOPICAL at 05:11

## 2021-06-21 RX ADMIN — Medication 50 MILLIGRAM(S): at 05:15

## 2021-06-21 RX ADMIN — INSULIN GLARGINE 12 UNIT(S): 100 INJECTION, SOLUTION SUBCUTANEOUS at 21:44

## 2021-06-21 NOTE — PROGRESS NOTE ADULT - ASSESSMENT
Acute on chronic systolic HF /fluid overload     Patient remains fluid overloaded on exam   Continue torsemide 20 mg BID - upon discharge, continue with BID for 10 days then 20 mg daily and second 20 mg only as needed for weight gain of 2 lb in one day.  Increase Entresto to 49/51 mg BID  Continue Metoprolol XL 50 mg BID  Continue Amiodarone  Maintain potassium >4.0, Mg >2.1  Strict intake and output  EP following   PT evaluation   Daily weight   DC planning from HF standpoint   Discussed with primary team      Acute on chronic systolic HF /fluid overload     Patient remains fluid overloaded on exam   Continue torsemide 20 mg BID - upon discharge, continue with BID for 10 days then 20 mg daily and second 20 mg only as needed for weight gain of 2 lb in one day.  Increase Entresto to 49/51 mg BID  Metoprolol XL 25 mg daily  Continue Amiodarone per EP   Maintain potassium >4.0, Mg >2.1  Strict intake and output  EP following   PT evaluation   Daily weight   DC planning from HF standpoint   Discussed with primary team

## 2021-06-21 NOTE — PROGRESS NOTE ADULT - SUBJECTIVE AND OBJECTIVE BOX
WILLIAN MARY 56y Female  MRN#: [id number]     SUBJECTIVE  Patient is a 56y old Female who presents with a chief complaint of CHF exacerbation (20 Jun 2021 15:44)      Today is hospital day 15d, and this morning she is lying in bed without distress.   No acute overnight events.     OBJECTIVE  PAST MEDICAL & SURGICAL HISTORY  Hypertension    Hyperlipidemia    Anxiety and depression    COPD, severe    CHF (congestive heart failure)    Cerebrovascular accident (CVA)  Multiple    Type 2 diabetes mellitus    CKD (chronic kidney disease), stage II    No significant past surgical history      ALLERGIES:  No Known Allergies    MEDICATIONS:  STANDING MEDICATIONS  albuterol/ipratropium for Nebulization. 3 milliLiter(s) Nebulizer once  aMIOdarone    Tablet 200 milliGRAM(s) Oral two times a day  aspirin  chewable 81 milliGRAM(s) Oral daily  atorvastatin 80 milliGRAM(s) Oral at bedtime  budesonide 160 MICROgram(s)/formoterol 4.5 MICROgram(s) Inhaler 2 Puff(s) Inhalation two times a day  chlorhexidine 4% Liquid 1 Application(s) Topical <User Schedule>  clopidogrel Tablet 75 milliGRAM(s) Oral daily  dextrose 40% Gel 15 Gram(s) Oral once  dextrose 5%. 1000 milliLiter(s) IV Continuous <Continuous>  dextrose 5%. 1000 milliLiter(s) IV Continuous <Continuous>  dextrose 50% Injectable 25 Gram(s) IV Push once  dextrose 50% Injectable 12.5 Gram(s) IV Push once  dextrose 50% Injectable 25 Gram(s) IV Push once  diVALproex  milliGRAM(s) Oral at bedtime  diVALproex  milliGRAM(s) Oral daily  glucagon  Injectable 1 milliGRAM(s) IntraMuscular once  insulin glargine Injectable (LANTUS) 12 Unit(s) SubCutaneous at bedtime  insulin lispro (ADMELOG) corrective regimen sliding scale   SubCutaneous three times a day before meals  insulin lispro Injectable (ADMELOG) 4 Unit(s) SubCutaneous three times a day before meals  LORazepam     Tablet 1 milliGRAM(s) Oral at bedtime  LORazepam     Tablet 0.5 milliGRAM(s) Oral <User Schedule>  magnesium oxide 400 milliGRAM(s) Oral three times a day with meals  magnesium sulfate  IVPB 2 Gram(s) IV Intermittent once  metoprolol succinate ER 50 milliGRAM(s) Oral two times a day  polyethylene glycol 3350 17 Gram(s) Oral two times a day  potassium chloride  20 mEq/100 mL IVPB 20 milliEquivalent(s) IV Intermittent once  sacubitril 24 mG/valsartan 26 mG 1 Tablet(s) Oral two times a day  senna 2 Tablet(s) Oral at bedtime  sodium chloride 0.9%. 1000 milliLiter(s) IV Continuous <Continuous>  torsemide 20 milliGRAM(s) Oral two times a day    PRN MEDICATIONS  bisacodyl Suppository 10 milliGRAM(s) Rectal daily PRN  LORazepam     Tablet 1 milliGRAM(s) Oral at bedtime PRN    HOME MEDICATIONS  Home Medications:  Albuterol (Eqv-ProAir HFA) 90 mcg/inh inhalation aerosol: 2 puff(s) inhaled every 6 hours, As Needed (07 Jun 2021 11:22)  aspirin 81 mg oral tablet: 1 tab(s) orally once a day (07 Jun 2021 11:22)  BuSpar 10 mg oral tablet: 1 tab(s) orally 3 times a day (07 Jun 2021 11:22)  fenofibrate 145 mg oral tablet: 1 tab(s) orally once a day (07 Jun 2021 11:22)  glipiZIDE 10 mg oral tablet: 1 tab(s) orally 2 times a day (07 Jun 2021 11:22)  metFORMIN 1000 mg oral tablet: 1 tab(s) orally 2 times a day (07 Jun 2021 11:22)  Metoprolol Tartrate 50 mg oral tablet: 1 tab(s) orally 2 times a day (07 Jun 2021 11:22)  Plavix 75 mg oral tablet: 1 tab(s) orally once a day (07 Jun 2021 11:22)  SEROquel 100 mg oral tablet: 2 tab(s) orally once a day (at bedtime) (07 Jun 2021 11:22)  Tresiba 100 units/mL subcutaneous solution: 50 unit(s) subcutaneous once a day (07 Jun 2021 11:22)      LABS:                        11.7   4.97  )-----------( 247      ( 21 Jun 2021 06:03 )             38.5     06-21    139  |  98  |  20  ----------------------------<  144<H>  3.3<L>   |  33<H>  |  0.8    Ca    9.1      21 Jun 2021 06:03  Mg     1.7     06-21    TPro  6.2  /  Alb  3.3<L>  /  TBili  0.5  /  DBili  x   /  AST  14  /  ALT  9   /  AlkPhos  108  06-21    LIVER FUNCTIONS - ( 21 Jun 2021 06:03 )  Alb: 3.3 g/dL / Pro: 6.2 g/dL / ALK PHOS: 108 U/L / ALT: 9 U/L / AST: 14 U/L / GGT: x                         CAPILLARY BLOOD GLUCOSE      POCT Blood Glucose.: 124 mg/dL (21 Jun 2021 07:37)      PHYSICAL EXAM:  T(C): 36.9 (06-21-21 @ 05:38), Max: 36.9 (06-21-21 @ 05:38)  HR: 65 (06-21-21 @ 05:38) (61 - 76)  BP: 138/73 (06-21-21 @ 05:38) (138/73 - 179/86)  RR: 18 (06-21-21 @ 05:38) (17 - 18)  SpO2: 98% (06-21-21 @ 05:38) (98% - 98%)    GENERAL: NAD, well-developed, 56y  EENT: EOMI, conjunctiva and sclera clear, No nasal obstruction or discharge  RESPIRATORY: Clear to auscultation bilaterally; No wheeze or crackles  CARDIOVASCULAR: Regular rate and rhythm; No murmurs, rubs, or gallops, no pitting edema  GASTROINTESTINAL: Abdomen Soft, Nontender, Nondistended  MUSCULOSKELETAL:  No cyanosis, extremities grossly symmetrical  PSYCH: AAOx3, affect appropriate  NEUROLOGY: non-focal, cognition grossly intact, MAEE    ADMISSION SUMMARY  Patient is a 56y old Female who presents with a chief complaint of CHF exacerbation (20 Jun 2021 15:44)

## 2021-06-21 NOTE — PROGRESS NOTE BEHAVIORAL HEALTH - ESTIMATED INTELLIGENCE
Average
Average
**ATTENDING ADDENDUM (Dr. Marvin Ross): NO movement-associated, pleuritic, reproducible, positional, or exertional component to the symptoms./none

## 2021-06-21 NOTE — PROGRESS NOTE ADULT - ASSESSMENT
57 yo F PMHx HTN, DM II, HFrEF, COPD on 4L O2, CVA with residual LE weakness and depression came to ED for worsening SOB and LE edema for the last two weeks. She was not taking her Lasix as it was not prescribe into her blister pack.   In the ED, Chest X-ray showed pulmonary vascular congestion and cardiomegaly. BNP elevated. Started on IV Lasix with resolution of exerbation. Pt now with atrial tachycardia    Acute on chronic hypoxic respiratory failure due to Acute on chronic HFrEF  - resolved  - Echo LVEF 38%, grade II diastolic dysfunction, mild to mod MR, mild to mod TR, mild AR, mod Pulm HTN.   - s/p Lasix 40mg IV BID, switched to Torsemide 20mg BID continue Metoprolol XL 200mg mg Daily and, switch Lisinopril to Losartan 100mg daily.   - spke with cardiology office, awaiting cardio follow up  - Daily weight, fluid restriction 1.5L /day, low sodium diet.   - underwent cardiac cath today, found to have 90% stenosis RCA with balloon and RICARDO placement    Atrial Tachycardia.   - pt placed on amiodarone infusion overnight, now on PO  - ccardizem d/c, c/w amio, metoprolol    Chronic Respiratory failure/COPD  - Continue Symbicort, continue DuoNeb prn.     History of CVA with residual LE weakness  - Continue ASA, Lipitor     HTN   Continue losartan, Lasix and Metoprolol.    DM II  - FS is controlled, A1C 6.8, continue Lantus 12 units and Pre-meals Lispro.     Depression   Continue Wellbutrin and Risperidone, depakote        #Progress Note Handoff:  Pending (specify):  post cath monitoring  Family discussion:  discussed cath  Disposition: Home with home care

## 2021-06-21 NOTE — PROGRESS NOTE ADULT - SUBJECTIVE AND OBJECTIVE BOX
CHIEF COMPLAINT:    Patient is a 56y old  Female who presents with a chief complaint of CHF exacerbation     INTERVAL HPI/OVERNIGHT EVENTS:    Patient seen and examined at bedside. No acute overnight events occurred.    ROS: Denies SOB, chest pain. All other systems are negative.    Vital Signs:    T(F): 98.4 (21 @ 05:38), Max: 98.4 (21 @ 05:38)  HR: 65 (21 @ 05:38) (65 - 76)  BP: 138/73 (21 @ 05:38) (138/73 - 179/86)  RR: 18 (21 @ 05:38) (18 - 18)  SpO2: 98% (21 @ 05:38) (98% - 98%)  I&O's Summary    2021 07:01  -  2021 07:00  --------------------------------------------------------  IN: 1040 mL / OUT: 3200 mL / NET: -2160 mL    2021 07:01  -  2021 14:18  --------------------------------------------------------  IN: 0 mL / OUT: 1600 mL / NET: -1600 mL      Daily     Daily Weight in k.5 (2021 05:38)  CAPILLARY BLOOD GLUCOSE      POCT Blood Glucose.: 103 mg/dL (2021 13:21)  POCT Blood Glucose.: 124 mg/dL (2021 07:37)  POCT Blood Glucose.: 161 mg/dL (2021 21:38)  POCT Blood Glucose.: 196 mg/dL (2021 16:39)      PHYSICAL EXAM:  GENERAL:  NAD  SKIN: No rashes or lesions  HEENT: Atraumatic. Normocephalic. Anicteric  NECK:  No JVD.   PULMONARY: Clear to ausculation bilaterally. No wheezing. No rales  CVS: Normal S1, S2. Regular rate and rhythm. No murmurs.  ABDOMEN/GI: Soft, Nontender, Nondistended; Bowel sounds are present  EXTREMITIES:  No edema B/L LE.  NEUROLOGIC:  No motor deficit.  PSYCH: Alert & oriented x 3, normal affect    Consultant(s) Notes Reviewed:  [x ] YES  [ ] NO      LABS:                        11.7   4.97  )-----------( 247      ( 2021 06:03 )             38.5         139  |  98  |  20  ----------------------------<  144<H>  3.3<L>   |  33<H>  |  0.8    Ca    9.1      2021 06:03  Mg     1.7         TPro  6.2  /  Alb  3.3<L>  /  TBili  0.5  /  DBili  x   /  AST  14  /  ALT  9   /  AlkPhos  108                RADIOLOGY & ADDITIONAL TESTS:  Imaging or report Personally Reviewed:  [ ] YES  [ ] NO    Telemetry reviewed independently - no acute events    Medications:  Standing  albuterol/ipratropium for Nebulization. 3 milliLiter(s) Nebulizer once  aMIOdarone    Tablet 200 milliGRAM(s) Oral two times a day  aspirin  chewable 81 milliGRAM(s) Oral daily  atorvastatin 80 milliGRAM(s) Oral at bedtime  budesonide 160 MICROgram(s)/formoterol 4.5 MICROgram(s) Inhaler 2 Puff(s) Inhalation two times a day  chlorhexidine 4% Liquid 1 Application(s) Topical <User Schedule>  clopidogrel Tablet 75 milliGRAM(s) Oral daily  dextrose 40% Gel 15 Gram(s) Oral once  dextrose 5%. 1000 milliLiter(s) IV Continuous <Continuous>  dextrose 5%. 1000 milliLiter(s) IV Continuous <Continuous>  dextrose 50% Injectable 25 Gram(s) IV Push once  dextrose 50% Injectable 12.5 Gram(s) IV Push once  dextrose 50% Injectable 25 Gram(s) IV Push once  diVALproex  milliGRAM(s) Oral at bedtime  diVALproex  milliGRAM(s) Oral daily  glucagon  Injectable 1 milliGRAM(s) IntraMuscular once  insulin glargine Injectable (LANTUS) 12 Unit(s) SubCutaneous at bedtime  insulin lispro (ADMELOG) corrective regimen sliding scale   SubCutaneous three times a day before meals  insulin lispro Injectable (ADMELOG) 4 Unit(s) SubCutaneous three times a day before meals  LORazepam     Tablet 1 milliGRAM(s) Oral at bedtime  LORazepam     Tablet 0.5 milliGRAM(s) Oral <User Schedule>  magnesium oxide 400 milliGRAM(s) Oral three times a day with meals  magnesium sulfate  IVPB 2 Gram(s) IV Intermittent once  metoprolol succinate ER 50 milliGRAM(s) Oral two times a day  polyethylene glycol 3350 17 Gram(s) Oral two times a day  potassium chloride  20 mEq/100 mL IVPB 20 milliEquivalent(s) IV Intermittent once  sacubitril 24 mG/valsartan 26 mG 1 Tablet(s) Oral two times a day  senna 2 Tablet(s) Oral at bedtime  sodium chloride 0.9%. 1000 milliLiter(s) IV Continuous <Continuous>  torsemide 20 milliGRAM(s) Oral two times a day    PRN Meds  bisacodyl Suppository 10 milliGRAM(s) Rectal daily PRN  LORazepam     Tablet 1 milliGRAM(s) Oral at bedtime PRN      Case discussed with resident  Care discussed with pt

## 2021-06-21 NOTE — PROGRESS NOTE ADULT - SUBJECTIVE AND OBJECTIVE BOX
Interval History:     - Patient resting in bed s/p H with a stent today  - Remains slightly fluid overloaded     HISTORY OF PRESENT ILLNESS:     56 year old female with a PMHx of chronic hypoxic respiratory failure (on 4L home O2) secondary to  HFrEF 38% (last exacerbation in ) , HTN, HLD, DM2, active smoker , CVA with residual LE weakness (, wheelchair bound),  COPD , mood /depression,  presents of worsening shortness of breath and increase weight with  B/L LE x 2weeks. Patient mentions that she hasn't been taking Lasix for the past two weeks as she ran out of it. Reports she is non compliant with fluid intake. Denies any chest pain, nausea, vomiting, urinary symptoms, fever, chills. Of note patient received 1 st dose of Moderna two weeks, due for 2nd shot on .    Patient reports worsening SOB for the past few weeks, now feeling better after diuresis. Patient endorses non-compliance with low sodium diet and was not aware she did not receive her Lasix on her medication package from the pharmacy. Patient denies c/p, palpitations, headaches, bleeding, LH, dizziness, orthopnea, PND, LOC, cough, feeling bloated, N/V/D.        PAST MEDICAL & SURGICAL HISTORY:    Hypertension  Hyperlipidemia  Anxiety and depression  COPD, severe  CHF (congestive heart failure)  Cerebrovascular accident (CVA) Multiple  Type 2 diabetes mellitus  CKD (chronic kidney disease), stage II    No significant past surgical history        FAMILY HISTORY:    No pertinent family history of premature cardiovascular disease in first degree relatives.  Mother:  of cancer at 65  Father:  of an overdose at 50    SOCIAL HISTORY:    Smokes at least a cigarette a day, denies alcohol or drug use, lives alone    Allergies    No Known Allergies    Intolerances      PHYSICAL EXAM:    General Appearance: well appearing, normal for age and gender. 	  Neck: normal JVP, no bruit.   Cardiovascular: regular rate and rhythm S1 S2, + JVD, No murmurs, 1+le edema  Respiratory: Lungs clear to auscultation	  Psychiatry: Alert and oriented x 3, Mood & affect appropriate  Gastrointestinal:  Soft, Non-tender  Musculoskeletal/ extremities: Normal range of motion, No clubbing, cyanosis ble edema  Vascular: Peripheral pulses palpable 2+ bilaterally      PREVIOUS DIAGNOSTIC TESTING:      TTE     Summary:   1. Moderately decreased segmental left ventricular systolic function.   2. LV Ejection Fraction by Urena's Method with a biplane EF of 38 %.   3. Spectral Doppler shows pseudonormal pattern of left ventricular myocardial filling (Grade IIdiastolic dysfunction).   4. Mild to moderate mitral valve regurgitation.   5. The mitral valve leaflets are tethered which is due to reduced systolic function and elevated LVDP.   6. Mild-moderate tricuspid regurgitation.   7. Mild aortic regurgitation.   8. Sclerotic aortic valve with normal opening.   9. Estimated pulmonary artery systolic pressure is 59.7 mmHg assuming a right atrial pressure of 15 mmHg, which is consistent with moderate pulmonary hypertension.      Home Medications:  Albuterol (Eqv-ProAir HFA) 90 mcg/inh inhalation aerosol: 2 puff(s) inhaled every 6 hours, As Needed (2021 11:)  aspirin 81 mg oral tablet: 1 tab(s) orally once a day (2021 11:22)  BuSpar 10 mg oral tablet: 1 tab(s) orally 3 times a day (:)  fenofibrate 145 mg oral tablet: 1 tab(s) orally once a day (2021 11:22)  glipiZIDE 10 mg oral tablet: 1 tab(s) orally 2 times a day (2021 11:22)  Lipitor 40 mg oral tablet: 1 tab(s) orally once a day (:22)  lisinopril 40 mg oral tablet: 1 tab(s) orally once a day (:22)  metFORMIN 1000 mg oral tablet: 1 tab(s) orally 2 times a day (2021 11:22)  Metoprolol Tartrate 50 mg oral tablet: 1 tab(s) orally 2 times a day (2021 11:22)  Norvasc 10 mg oral tablet: 1 tab(s) orally once a day (2021 11:22)  Plavix 75 mg oral tablet: 1 tab(s) orally once a day (:)  SEROquel 100 mg oral tablet: 2 tab(s) orally once a day (at bedtime) (:22)  Tresiba 100 units/mL subcutaneous solution: 50 unit(s) subcutaneous once a day (2021 11:22)    MEDICATIONS  (STANDING):  aspirin  chewable 81 milliGRAM(s) Oral daily  atorvastatin 80 milliGRAM(s) Oral at bedtime  budesonide 160 MICROgram(s)/formoterol 4.5 MICROgram(s) Inhaler 2 Puff(s) Inhalation two times a day  busPIRone 10 milliGRAM(s) Oral three times a day  chlorhexidine 4% Liquid 1 Application(s) Topical <User Schedule>  clopidogrel Tablet 75 milliGRAM(s) Oral daily  dextrose 40% Gel 15 Gram(s) Oral once  dextrose 5%. 1000 milliLiter(s) (50 mL/Hr) IV Continuous <Continuous>  dextrose 5%. 1000 milliLiter(s) (100 mL/Hr) IV Continuous <Continuous>  dextrose 50% Injectable 25 Gram(s) IV Push once  dextrose 50% Injectable 12.5 Gram(s) IV Push once  dextrose 50% Injectable 25 Gram(s) IV Push once  enoxaparin Injectable 40 milliGRAM(s) SubCutaneous daily  furosemide   Injectable 40 milliGRAM(s) IV Push every 12 hours  glucagon  Injectable 1 milliGRAM(s) IntraMuscular once  insulin glargine Injectable (LANTUS) 12 Unit(s) SubCutaneous at bedtime  insulin lispro (ADMELOG) corrective regimen sliding scale   SubCutaneous three times a day before meals  insulin lispro Injectable (ADMELOG) 4 Unit(s) SubCutaneous three times a day before meals  lisinopril 40 milliGRAM(s) Oral daily  metoprolol succinate  milliGRAM(s) Oral daily  polyethylene glycol 3350 17 Gram(s) Oral two times a day  QUEtiapine 100 milliGRAM(s) Oral at bedtime  risperiDONE   Tablet 1 milliGRAM(s) Oral daily  risperiDONE   Tablet 3 milliGRAM(s) Oral at bedtime  senna 2 Tablet(s) Oral at bedtime    MEDICATIONS  (PRN):  bisacodyl Suppository 10 milliGRAM(s) Rectal daily PRN Constipation           Interval History:     - Patient resting in bed in recovery area, she went for [facility] today, s/p PCI to RCA  - Remains slightly fluid overloaded     HISTORY OF PRESENT ILLNESS:     56 year old female with a PMHx of chronic hypoxic respiratory failure (on 4L home O2) secondary to  HFrEF 38% (last exacerbation in ) , HTN, HLD, DM2, active smoker , CVA with residual LE weakness (, wheelchair bound),  COPD , mood /depression,  presents of worsening shortness of breath and increase weight with  B/L LE x 2weeks. Patient mentions that she hasn't been taking Lasix for the past two weeks as she ran out of it. Reports she is non compliant with fluid intake. Denies any chest pain, nausea, vomiting, urinary symptoms, fever, chills. Of note patient received 1 st dose of Moderna two weeks, due for 2nd shot on .    Patient reports worsening SOB for the past few weeks, now feeling better after diuresis. Patient endorses non-compliance with low sodium diet and was not aware she did not receive her Lasix on her medication package from the pharmacy. Patient denies c/p, palpitations, headaches, bleeding, LH, dizziness, orthopnea, PND, LOC, cough, feeling bloated, N/V/D.        PAST MEDICAL & SURGICAL HISTORY:    Hypertension  Hyperlipidemia  Anxiety and depression  COPD, severe  CHF (congestive heart failure)  Cerebrovascular accident (CVA) Multiple  Type 2 diabetes mellitus  CKD (chronic kidney disease), stage II    No significant past surgical history        FAMILY HISTORY:    No pertinent family history of premature cardiovascular disease in first degree relatives.  Mother:  of cancer at 65  Father:  of an overdose at 50    SOCIAL HISTORY:    Smokes at least a cigarette a day, denies alcohol or drug use, lives alone    Allergies    No Known Allergies    Intolerances      PHYSICAL EXAM:    General Appearance: well appearing, normal for age and gender. 	  Neck: normal JVP, no bruit.   Cardiovascular: regular rate and rhythm S1 S2, + JVD, No murmurs, 1+le edema  Respiratory: Lungs clear to auscultation	  Psychiatry: Alert and oriented x 3, Mood & affect appropriate  Gastrointestinal:  Soft, Non-tender  Musculoskeletal/ extremities: Normal range of motion, No clubbing, cyanosis ble edema  Vascular: Peripheral pulses palpable 2+ bilaterally      PREVIOUS DIAGNOSTIC TESTING:      TTE     Summary:   1. Moderately decreased segmental left ventricular systolic function.   2. LV Ejection Fraction by Urena's Method with a biplane EF of 38 %.   3. Spectral Doppler shows pseudonormal pattern of left ventricular myocardial filling (Grade IIdiastolic dysfunction).   4. Mild to moderate mitral valve regurgitation.   5. The mitral valve leaflets are tethered which is due to reduced systolic function and elevated LVDP.   6. Mild-moderate tricuspid regurgitation.   7. Mild aortic regurgitation.   8. Sclerotic aortic valve with normal opening.   9. Estimated pulmonary artery systolic pressure is 59.7 mmHg assuming a right atrial pressure of 15 mmHg, which is consistent with moderate pulmonary hypertension.      Home Medications:  Albuterol (Eqv-ProAir HFA) 90 mcg/inh inhalation aerosol: 2 puff(s) inhaled every 6 hours, As Needed (:)  aspirin 81 mg oral tablet: 1 tab(s) orally once a day (:)  BuSpar 10 mg oral tablet: 1 tab(s) orally 3 times a day (:)  fenofibrate 145 mg oral tablet: 1 tab(s) orally once a day (:)  glipiZIDE 10 mg oral tablet: 1 tab(s) orally 2 times a day (:)  Lipitor 40 mg oral tablet: 1 tab(s) orally once a day (:)  lisinopril 40 mg oral tablet: 1 tab(s) orally once a day (:22)  metFORMIN 1000 mg oral tablet: 1 tab(s) orally 2 times a day (:)  Metoprolol Tartrate 50 mg oral tablet: 1 tab(s) orally 2 times a day (:)  Norvasc 10 mg oral tablet: 1 tab(s) orally once a day (:)  Plavix 75 mg oral tablet: 1 tab(s) orally once a day (:)  SEROquel 100 mg oral tablet: 2 tab(s) orally once a day (at bedtime) (2021 11:22)  Tresiba 100 units/mL subcutaneous solution: 50 unit(s) subcutaneous once a day (2021 11:)    MEDICATIONS  (STANDING):  aspirin  chewable 81 milliGRAM(s) Oral daily  atorvastatin 80 milliGRAM(s) Oral at bedtime  budesonide 160 MICROgram(s)/formoterol 4.5 MICROgram(s) Inhaler 2 Puff(s) Inhalation two times a day  busPIRone 10 milliGRAM(s) Oral three times a day  chlorhexidine 4% Liquid 1 Application(s) Topical <User Schedule>  clopidogrel Tablet 75 milliGRAM(s) Oral daily  dextrose 40% Gel 15 Gram(s) Oral once  dextrose 5%. 1000 milliLiter(s) (50 mL/Hr) IV Continuous <Continuous>  dextrose 5%. 1000 milliLiter(s) (100 mL/Hr) IV Continuous <Continuous>  dextrose 50% Injectable 25 Gram(s) IV Push once  dextrose 50% Injectable 12.5 Gram(s) IV Push once  dextrose 50% Injectable 25 Gram(s) IV Push once  enoxaparin Injectable 40 milliGRAM(s) SubCutaneous daily  furosemide   Injectable 40 milliGRAM(s) IV Push every 12 hours  glucagon  Injectable 1 milliGRAM(s) IntraMuscular once  insulin glargine Injectable (LANTUS) 12 Unit(s) SubCutaneous at bedtime  insulin lispro (ADMELOG) corrective regimen sliding scale   SubCutaneous three times a day before meals  insulin lispro Injectable (ADMELOG) 4 Unit(s) SubCutaneous three times a day before meals  lisinopril 40 milliGRAM(s) Oral daily  metoprolol succinate  milliGRAM(s) Oral daily  polyethylene glycol 3350 17 Gram(s) Oral two times a day  QUEtiapine 100 milliGRAM(s) Oral at bedtime  risperiDONE   Tablet 1 milliGRAM(s) Oral daily  risperiDONE   Tablet 3 milliGRAM(s) Oral at bedtime  senna 2 Tablet(s) Oral at bedtime    MEDICATIONS  (PRN):  bisacodyl Suppository 10 milliGRAM(s) Rectal daily PRN Constipation

## 2021-06-21 NOTE — PROGRESS NOTE ADULT - ASSESSMENT
Assessment:    Patient is a 56 year old woman with PMH chronic hypoxic respiratory failure (on 4L home O2) secondary to  HFrEF 38%, HTN, HLD, DM2, CORNELIUS on CPAP, CVA with residual LE weakness (2014, wheelchair bound),  COPD, depression who presents of worsening shortness of breath and increase weight with  B/L LE x 2weeks. Admitted for acute on chronic CHF exacerbation.    # Acute on chronic CHF exacerbation with resolved acute on chronic hypoxic respiratory failure  - Currently on 4L NC (on 4L at home)  - BNP 6/6: 7726  - CXR 6/6: cardiomegaly and bilateral interstitial opacities; repeat CXR 6/15 improving   - Continue torsemide 20mg BID; continue for 10 days and switch to 20mg q24hrs afterward with additional dose only for weight gain >2lbs   - Daily weights/ strict I+O/ fluid restrict  - metoprolol succinate 50 BID  - diltiazem 30mg q8hrs   - Continue Entresto 24-26 BID  - Heart failure consult appreciated   - Echo 6/2021: EF 35-40%, grade III diastolic dysfunction  - Had stress test 6/16; showing "MODERATE REVERSIBLE DEFECT IN THE SEPTUM, ANTERIOR AND INFERIOR WALL OF THE LEFT VENTRICLE CONSISTENT WITH ISCHEMIA";   - NPO since midnight. Going to cath today    # Narrow complex tachycardia  -Seems to be Atrial tachycardia   -On metoprolol succinate 50mg BID, diltiazem 30mg q8hrs  -Switch amiodarone drip to amiodarone 200mg BID  -EP consult appreciated  -On clopidogrel and ASA; may need full AC    # DM II  - Lantus 12 qhs, lispro 4/4/4  - 6/8/21 A1c 6.8    # HLD  - Continue atorvastatin 80mg qhs  - Lipid panel: LDL 42, HDL 29, triglycerides 183     # COPD/pulmonary HTN  - Continue inhalers    # CORNELIUS/OHS on CPAP  - CPAP at night  - Outpatient pulm follow-up    # CVA with residual LE weakness  - c/w ASA/plavix/statin    3 Depression/ mood disorder  - Continue risperidone 3mg qhs, quetiapine 100mg qhs, buspirone 10mg TID    MISC:  - DVT ppx: lovenox  - PPI ppx: NA  - DIET: DASH carb consistent, fluid restriction  - Code Status: Full Code   Assessment:    Patient is a 56 year old woman with PMH chronic hypoxic respiratory failure (on 4L home O2) secondary to  HFrEF 38%, HTN, HLD, DM2, CORNELIUS on CPAP, CVA with residual LE weakness (2014, wheelchair bound),  COPD, depression who presents of worsening shortness of breath and increase weight with  B/L LE x 2weeks. Admitted for acute on chronic CHF exacerbation.    # Acute on chronic CHF exacerbation with resolved acute on chronic hypoxic respiratory failure  - Currently on 4L NC (on 4L at home)  - BNP 6/6: 7726  - CXR 6/6: cardiomegaly and bilateral interstitial opacities; repeat CXR 6/15 improving   - Continue torsemide 20mg BID; continue for 10 days and switch to 20mg q24hrs afterward with additional dose only for weight gain >2lbs   - Daily weights/ strict I+O/ fluid restrict  - metoprolol succinate 50 BID  - diltiazem 30mg q8hrs   - Continue Entresto 24-26 BID  - Heart failure consulted  - Echo 6/2021: EF 35-40%, grade III diastolic dysfunction  - Had stress test 6/16; showing "MODERATE REVERSIBLE DEFECT IN THE SEPTUM, ANTERIOR AND INFERIOR WALL OF THE LEFT VENTRICLE CONSISTENT WITH ISCHEMIA";   - Pt went for cath today. 70-95% diffuse lesion in RCA. PCI performed to mid RCA    # Narrow complex tachycardia  -Seems to be Atrial tachycardia   -On metoprolol succinate 50mg BID, diltiazem 30mg q8hrs  -Switch amiodarone drip to amiodarone 200mg BID  -EP consult appreciated  -On clopidogrel and ASA; may need full AC    # DM II  - Lantus 12 qhs, lispro 4/4/4  - 6/8/21 A1c 6.8    # HLD  - Continue atorvastatin 80mg qhs  - Lipid panel: LDL 42, HDL 29, triglycerides 183     # COPD/pulmonary HTN  - Continue inhalers    # CORNELIUS/OHS on CPAP  - CPAP at night  - Outpatient pulm follow-up    # CVA with residual LE weakness  - c/w ASA/plavix/statin    # Depression/ mood disorder  - Continue risperidone 3mg qhs, quetiapine 100mg qhs, buspirone 10mg TID    MISC:  - DVT ppx: lovenox  - PPI ppx: NA  - DIET: DASH carb consistent, fluid restriction  - Code Status: Full Code

## 2021-06-21 NOTE — PROGRESS NOTE BEHAVIORAL HEALTH - NSBHCONSULTFOLLOWAFTERCARE_PSY_A_CORE FT
Patient will  continue follow up with psychiatrist, Dr. Sierra from [facility], [phone], next scheduled appointment on June 30, 2021.
Patient will  continue follow up with psychiatrist, Dr. Sierra from [facility], [phone], next scheduled appointment on June 30, 2021.

## 2021-06-21 NOTE — PROGRESS NOTE BEHAVIORAL HEALTH - NSBHFUPINTERVALHXFT_PSY_A_CORE
No events overnight. Seroquel 100mg po hs was put on hold. She was started on ativan 1mg po hs along with PRN dose at night. Patient stated she felt asleep and was able to remains asleep. At the time of this evaluation. She is still anxious about the procedure, but able to express very well her understanding to have it complete. At the time of this evaluation, she is not psychotic, she is manic. While there is a component anxiety associated with the hospitalization, there is no suicidal ideation elicited.
Patient was started on depakote 250mg  two times a day, which she has been compliant with and no side effect is noted or reported. On evaluation she was noted to be goal directed with her statements and at no distress. Still feel  "a little anxious" but not as severe as previously. There is no sign of avleria or psychosis present. She remains free of thought of self-harm. Sleep and appetite are fine.

## 2021-06-21 NOTE — PROGRESS NOTE BEHAVIORAL HEALTH - OTHER
CVA with residual left side weakness, currently wheelchair  bound
CVA with residual left side weakness, currently wheelchair  bound
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
## 2021-06-21 NOTE — CHART NOTE - NSCHARTNOTEFT_GEN_A_CORE
PRE-OP DIAGNOSIS: abnormal stress test, decompensated HFrEF, HTN, DL, smoker, obesity, hx of CVA     PROCEDURE: [facility] with coronary angiography    Physician: Dr Murphy  Assistant: Ana María    ANESTHESIA TYPE:  [  ]General Anesthesia  [  ] Sedation  [ x ] Local/Regional    ESTIMATED BLOOD LOSS:    10   mL    CONDITION  [  ] Critical  [  ] Serious  [  ]Fair  [ x ]Good      SPECIMENS REMOVED (IF APPLICABLE): N/A      IV CONTRAST:   150         mL      IMPLANTS (IF APPLICABLE)      FINDINGS    Left Heart Catheterization:  LVEDP: mild elevation         LEFT HEART CATHETERIZATION                                    Left main     LAD:                        Diag:    Left Circumflex:  OM:    Right Coronary Artery: Prox mild disease, Mid 70-95% diffuse lesion , Distal minor irregularities  RPDA normal   RPL normal       DOMINANCE: Right    ACCESS: Right radial   CLOSURE: D stat     INTERVENTION  IVUS: RCA  Successful PCI to Mid RCA with balloon angioplasty and SYNERGY 3.5 x 28 mm       POST-OP DIAGNOSIS  Significant RCA lesion, AUC score 7         PLAN OF CARE    [ x] Return to In-patient bed  [x ]  Continue DAPT, B-blocker & Statin therapy  gentle hydration   monitor kidney function       Results of procedure/ plan of care discussed with patient/  in detail. PRE-OP DIAGNOSIS: abnormal stress test, decompensated HFrEF, HTN, DL, smoker, obesity, hx of CVA     PROCEDURE: [facility] with coronary angiography    Physician: Dr Murphy  Assistant: Ana María    ANESTHESIA TYPE:  [  ]General Anesthesia  [  ] Sedation  [ x ] Local/Regional    ESTIMATED BLOOD LOSS:    10   mL    CONDITION  [  ] Critical  [  ] Serious  [  ]Fair  [ x ]Good      SPECIMENS REMOVED (IF APPLICABLE): N/A      IV CONTRAST:   150         mL      IMPLANTS (IF APPLICABLE)      FINDINGS    Left Heart Catheterization:  LVEDP: mild elevation         LEFT HEART CATHETERIZATION                                    Left main normal     LAD:       mild disease                Diag: patent    Left Circumflex: minor irregularities  OM: normal     Right Coronary Artery: Prox mild disease, Mid 70-95% diffuse lesion , Distal minor irregularities  RPDA normal   RPL normal       DOMINANCE: Right    ACCESS: Right radial   CLOSURE: D stat     INTERVENTION  IVUS: RCA  Successful PCI to Mid RCA with balloon angioplasty and SYNERGY 3.5 x 28 mm       POST-OP DIAGNOSIS  Significant RCA lesion, AUC score 7         PLAN OF CARE    [ x] Return to In-patient bed  [x ]  Continue DAPT, B-blocker & Statin therapy  gentle hydration   monitor kidney function       Results of procedure/ plan of care discussed with patient/  in detail.

## 2021-06-22 ENCOUNTER — TRANSCRIPTION ENCOUNTER (OUTPATIENT)
Age: 56
End: 2021-06-22

## 2021-06-22 VITALS — HEART RATE: 67 BPM | SYSTOLIC BLOOD PRESSURE: 160 MMHG

## 2021-06-22 LAB
ALBUMIN SERPL ELPH-MCNC: 3.8 G/DL — SIGNIFICANT CHANGE UP (ref 3.5–5.2)
ALP SERPL-CCNC: 103 U/L — SIGNIFICANT CHANGE UP (ref 30–115)
ALT FLD-CCNC: 9 U/L — SIGNIFICANT CHANGE UP (ref 0–41)
ANION GAP SERPL CALC-SCNC: 12 MMOL/L — SIGNIFICANT CHANGE UP (ref 7–14)
AST SERPL-CCNC: 17 U/L — SIGNIFICANT CHANGE UP (ref 0–41)
BASOPHILS # BLD AUTO: 0.08 K/UL — SIGNIFICANT CHANGE UP (ref 0–0.2)
BASOPHILS NFR BLD AUTO: 1.5 % — HIGH (ref 0–1)
BILIRUB SERPL-MCNC: 0.5 MG/DL — SIGNIFICANT CHANGE UP (ref 0.2–1.2)
BUN SERPL-MCNC: 18 MG/DL — SIGNIFICANT CHANGE UP (ref 10–20)
CALCIUM SERPL-MCNC: 9.4 MG/DL — SIGNIFICANT CHANGE UP (ref 8.5–10.1)
CHLORIDE SERPL-SCNC: 97 MMOL/L — LOW (ref 98–110)
CO2 SERPL-SCNC: 31 MMOL/L — SIGNIFICANT CHANGE UP (ref 17–32)
CREAT SERPL-MCNC: 0.9 MG/DL — SIGNIFICANT CHANGE UP (ref 0.7–1.5)
EOSINOPHIL # BLD AUTO: 0.13 K/UL — SIGNIFICANT CHANGE UP (ref 0–0.7)
EOSINOPHIL NFR BLD AUTO: 2.4 % — SIGNIFICANT CHANGE UP (ref 0–8)
GLUCOSE BLDC GLUCOMTR-MCNC: 112 MG/DL — HIGH (ref 70–99)
GLUCOSE BLDC GLUCOMTR-MCNC: 157 MG/DL — HIGH (ref 70–99)
GLUCOSE BLDC GLUCOMTR-MCNC: 163 MG/DL — HIGH (ref 70–99)
GLUCOSE SERPL-MCNC: 147 MG/DL — HIGH (ref 70–99)
HCT VFR BLD CALC: 39.7 % — SIGNIFICANT CHANGE UP (ref 37–47)
HGB BLD-MCNC: 12.4 G/DL — SIGNIFICANT CHANGE UP (ref 12–16)
IMM GRANULOCYTES NFR BLD AUTO: 0.4 % — HIGH (ref 0.1–0.3)
LYMPHOCYTES # BLD AUTO: 1.47 K/UL — SIGNIFICANT CHANGE UP (ref 1.2–3.4)
LYMPHOCYTES # BLD AUTO: 27.1 % — SIGNIFICANT CHANGE UP (ref 20.5–51.1)
MAGNESIUM SERPL-MCNC: 1.7 MG/DL — LOW (ref 1.8–2.4)
MCHC RBC-ENTMCNC: 27.3 PG — SIGNIFICANT CHANGE UP (ref 27–31)
MCHC RBC-ENTMCNC: 31.2 G/DL — LOW (ref 32–37)
MCV RBC AUTO: 87.3 FL — SIGNIFICANT CHANGE UP (ref 81–99)
MONOCYTES # BLD AUTO: 0.42 K/UL — SIGNIFICANT CHANGE UP (ref 0.1–0.6)
MONOCYTES NFR BLD AUTO: 7.7 % — SIGNIFICANT CHANGE UP (ref 1.7–9.3)
NEUTROPHILS # BLD AUTO: 3.3 K/UL — SIGNIFICANT CHANGE UP (ref 1.4–6.5)
NEUTROPHILS NFR BLD AUTO: 60.9 % — SIGNIFICANT CHANGE UP (ref 42.2–75.2)
NRBC # BLD: 0 /100 WBCS — SIGNIFICANT CHANGE UP (ref 0–0)
PLATELET # BLD AUTO: 249 K/UL — SIGNIFICANT CHANGE UP (ref 130–400)
POTASSIUM SERPL-MCNC: 3.7 MMOL/L — SIGNIFICANT CHANGE UP (ref 3.5–5)
POTASSIUM SERPL-SCNC: 3.7 MMOL/L — SIGNIFICANT CHANGE UP (ref 3.5–5)
PROT SERPL-MCNC: 6.9 G/DL — SIGNIFICANT CHANGE UP (ref 6–8)
RBC # BLD: 4.55 M/UL — SIGNIFICANT CHANGE UP (ref 4.2–5.4)
RBC # FLD: 16.9 % — HIGH (ref 11.5–14.5)
SODIUM SERPL-SCNC: 140 MMOL/L — SIGNIFICANT CHANGE UP (ref 135–146)
WBC # BLD: 5.42 K/UL — SIGNIFICANT CHANGE UP (ref 4.8–10.8)
WBC # FLD AUTO: 5.42 K/UL — SIGNIFICANT CHANGE UP (ref 4.8–10.8)

## 2021-06-22 PROCEDURE — 93010 ELECTROCARDIOGRAM REPORT: CPT

## 2021-06-22 PROCEDURE — 99238 HOSP IP/OBS DSCHRG MGMT 30/<: CPT

## 2021-06-22 RX ORDER — AMIODARONE HYDROCHLORIDE 400 MG/1
1 TABLET ORAL
Qty: 60 | Refills: 0
Start: 2021-06-22 | End: 2021-07-21

## 2021-06-22 RX ORDER — SACUBITRIL AND VALSARTAN 24; 26 MG/1; MG/1
1 TABLET, FILM COATED ORAL
Refills: 0 | Status: DISCONTINUED | OUTPATIENT
Start: 2021-06-22 | End: 2021-06-22

## 2021-06-22 RX ORDER — SACUBITRIL AND VALSARTAN 24; 26 MG/1; MG/1
1 TABLET, FILM COATED ORAL
Qty: 30 | Refills: 0
Start: 2021-06-22 | End: 2021-07-21

## 2021-06-22 RX ORDER — METOPROLOL TARTRATE 50 MG
1 TABLET ORAL
Qty: 0 | Refills: 0 | DISCHARGE

## 2021-06-22 RX ORDER — METFORMIN HYDROCHLORIDE 850 MG/1
1 TABLET ORAL
Qty: 0 | Refills: 0 | DISCHARGE

## 2021-06-22 RX ORDER — HYDRALAZINE HCL 50 MG
5 TABLET ORAL ONCE
Refills: 0 | Status: DISCONTINUED | OUTPATIENT
Start: 2021-06-22 | End: 2021-06-22

## 2021-06-22 RX ORDER — DIVALPROEX SODIUM 500 MG/1
1 TABLET, DELAYED RELEASE ORAL
Qty: 60 | Refills: 0
Start: 2021-06-22 | End: 2021-07-21

## 2021-06-22 RX ORDER — SACUBITRIL AND VALSARTAN 24; 26 MG/1; MG/1
1 TABLET, FILM COATED ORAL
Qty: 60 | Refills: 0
Start: 2021-06-22 | End: 2021-07-21

## 2021-06-22 RX ORDER — METOPROLOL TARTRATE 50 MG
1 TABLET ORAL
Qty: 30 | Refills: 0
Start: 2021-06-22 | End: 2021-07-21

## 2021-06-22 RX ORDER — QUETIAPINE FUMARATE 200 MG/1
2 TABLET, FILM COATED ORAL
Qty: 0 | Refills: 0 | DISCHARGE

## 2021-06-22 RX ADMIN — SACUBITRIL AND VALSARTAN 1 TABLET(S): 24; 26 TABLET, FILM COATED ORAL at 05:52

## 2021-06-22 RX ADMIN — DIVALPROEX SODIUM 250 MILLIGRAM(S): 500 TABLET, DELAYED RELEASE ORAL at 11:32

## 2021-06-22 RX ADMIN — MAGNESIUM OXIDE 400 MG ORAL TABLET 400 MILLIGRAM(S): 241.3 TABLET ORAL at 17:05

## 2021-06-22 RX ADMIN — AMIODARONE HYDROCHLORIDE 200 MILLIGRAM(S): 400 TABLET ORAL at 17:06

## 2021-06-22 RX ADMIN — Medication 1: at 08:06

## 2021-06-22 RX ADMIN — MAGNESIUM OXIDE 400 MG ORAL TABLET 400 MILLIGRAM(S): 241.3 TABLET ORAL at 11:32

## 2021-06-22 RX ADMIN — Medication 4 UNIT(S): at 08:05

## 2021-06-22 RX ADMIN — AMIODARONE HYDROCHLORIDE 200 MILLIGRAM(S): 400 TABLET ORAL at 05:52

## 2021-06-22 RX ADMIN — Medication 20 MILLIGRAM(S): at 05:52

## 2021-06-22 RX ADMIN — Medication 50 MILLIGRAM(S): at 17:05

## 2021-06-22 RX ADMIN — Medication 50 MILLIGRAM(S): at 05:52

## 2021-06-22 RX ADMIN — SACUBITRIL AND VALSARTAN 1 TABLET(S): 24; 26 TABLET, FILM COATED ORAL at 17:06

## 2021-06-22 RX ADMIN — Medication 0.5 MILLIGRAM(S): at 05:52

## 2021-06-22 RX ADMIN — Medication 20 MILLIGRAM(S): at 17:06

## 2021-06-22 RX ADMIN — Medication 0.5 MILLIGRAM(S): at 13:48

## 2021-06-22 RX ADMIN — CHLORHEXIDINE GLUCONATE 1 APPLICATION(S): 213 SOLUTION TOPICAL at 05:52

## 2021-06-22 RX ADMIN — BUDESONIDE AND FORMOTEROL FUMARATE DIHYDRATE 2 PUFF(S): 160; 4.5 AEROSOL RESPIRATORY (INHALATION) at 08:07

## 2021-06-22 RX ADMIN — Medication 81 MILLIGRAM(S): at 11:32

## 2021-06-22 RX ADMIN — CLOPIDOGREL BISULFATE 75 MILLIGRAM(S): 75 TABLET, FILM COATED ORAL at 11:32

## 2021-06-22 RX ADMIN — Medication 1: at 12:02

## 2021-06-22 RX ADMIN — Medication 4 UNIT(S): at 12:01

## 2021-06-22 NOTE — CHART NOTE - NSCHARTNOTEFT_GEN_A_CORE
Pt seen and examined at bedside. Pt medically stable for dc home. Medications reviewed, emphasized following up with psychiatry as she only has a 10 day supply of ativan and withdrawal can be fatal.    PHYSICAL EXAM:  GENERAL: NAD, speaks in full sentences, no signs of respiratory distress  HEAD:  Atraumatic, Normocephalic  EYES: Anicteric  NECK: Supple, No JVD  CHEST/LUNG: Clear to auscultation bilaterally; No wheeze; No crackles; No accessory muscles used  HEART: Regular rate and rhythm; No murmurs;   ABDOMEN: Soft, Nontender, Nondistended; Bowel sounds present; No guarding  EXTREMITIES:  2+ Peripheral Pulses, No cyanosis or edema  PSYCH: AAOx3  NEUROLOGY: non-focal  SKIN: No rashes or lesions

## 2021-06-22 NOTE — PROGRESS NOTE ADULT - NSICDXPILOT_GEN_ALL_CORE
[place]
[employer]
[place]

## 2021-06-22 NOTE — CHART NOTE - NSCHARTNOTESELECT_GEN_ALL_CORE
Cardiology
Discharge/Event Note
Event Note
Event Note
pre-cath note
Cardiology post cath note/Event Note
Event Note
Event Note
RD Limited Follow-Up/Event Note
RD limited note/Event Note

## 2021-06-22 NOTE — PROGRESS NOTE ADULT - REASON FOR ADMISSION
CHF exacerbation

## 2021-06-22 NOTE — CHART NOTE - NSCHARTNOTEFT_GEN_A_CORE
<<<RESIDENT DISCHARGE NOTE>>>     WILLIAN MARY  MRN-[id number]    VITAL SIGNS:  T(F): 98.4 (06-22-21 @ 12:53), Max: 98.4 (06-22-21 @ 12:53)  HR: 69 (06-22-21 @ 13:42)  BP: 156/85 (06-22-21 @ 13:42)  SpO2: 100% (06-22-21 @ 08:03)      PHYSICAL EXAMINATION:  General: NAD, 56 year old female  Head & Neck: NC/AT  Pulmonary: Clear to auscultation bilaterally  Cardiovascular: regular rate and rhythm  Gastrointestinal/Abdomen & Pelvis: soft, nontender, nondistended  Neurologic/Motor: non-focal, cognition intact    TEST RESULTS:                        12.4   5.42  )-----------( 249      ( 22 Jun 2021 05:47 )             39.7       06-22    140  |  97<L>  |  18  ----------------------------<  147<H>  3.7   |  31  |  0.9    Ca    9.4      22 Jun 2021 05:47  Mg     1.7     06-22    TPro  6.9  /  Alb  3.8  /  TBili  0.5  /  DBili  x   /  AST  17  /  ALT  9   /  AlkPhos  103  06-22      FINAL DISCHARGE INTERVIEW:  Resident(s) Present: (Name: Tony Downey MD), RN Present: (Name:  ___________)    DISCHARGE MEDICATION RECONCILIATION  reviewed with Attending (Name: Dr. June Trammell DO)    DISPOSITION:   [  ] Home,    [  ] Home with Visiting Nursing Services,   [ X ]  SNF/ NH,    [   ] Acute Rehab (4A),   [   ] Other (Specify:_________)

## 2021-06-22 NOTE — CHART NOTE - NSCHARTNOTEFT_GEN_A_CORE
Registered Dietitian Limited Follow-Up:    Acute on chronic CHF exacerbation with resolved acute on chronic hypoxic respiratory failure per progress notes. Narrow complex tachycardia noted. CVA with residual LE weakness noted. Receives DASH/TLC diet. Consumes % of meals at this time; reportedly with no issues tolerating at this time. Latest wt 59.5 kg (6/21) appears to be documentation error. Previous wt 82 kg (6/19) & 96.4 kg (6/17). Majority of wts have been ~95 kg - ? etiology of significant wt change over 2 day duration. Edema noted this admit + pt on lasix this admit; wt change may be r/t fluid shifts vs documentation error. Will continue to monitor. Appears to be meeting energy needs at this time. Labs/meds reviewed. No chewing/swallowing difficulty reported at this time. No pressure ulcer noted. Last BM 6/20. No N/V/D/C reported at this time. No new nutrition intervention at this time. Will review in 7 days.

## 2021-06-22 NOTE — PROGRESS NOTE ADULT - SUBJECTIVE AND OBJECTIVE BOX
Cardiology Follow up    WILLIAN MARY   56y Female  PAST MEDICAL & SURGICAL HISTORY:  Hypertension    Hyperlipidemia    Anxiety and depression    COPD, severe    CHF (congestive heart failure)    Cerebrovascular accident (CVA)  Multiple    Type 2 diabetes mellitus    CKD (chronic kidney disease), stage II    No significant past surgical history         HPI:  56 year old female with a pmhx of chronic hypoxic respiratory failure (on 4L home O2) secondary to  HFrEF 38% (last exacerbation in 2020) , HTN, HLD, DM2, active smoker , CVA with residual LE weakness (2014, wheelchair bound),  COPD , mood /depression,  presents of worsening shortness of breath and increase weight with  B/L LE x 2weeks. Patient mentions that she hasnt been taking lasix for the past two weeks as she she ran out of it. Reports she is non compliant with fluid intake. Denies any chest pain, nausea, vomiting, urinary symptoms, fever, chills. Of note patient received 1 st dose of Moderna two weeks, due for 2nd shot on 8th june.     ER Course:  Vital Signs:  T(F): 99.6 (06 Jun 2021 19:33), Max: 99.6 (06 Jun 2021 19:33)  HR: 103 (06 Jun 2021 23:00) (102 - 103)  BP: 151/95 (06 Jun 2021 23:00) (151/95 - 160/99)  RR: 20 (06 Jun 2021 23:00) (20 - 20)  SpO2: 94% (06 Jun 2021 23:00) (94% - 98%)    Patient was placed on 6l, improved , s/p 40mg lasix   (06 Jun 2021 23:02)    Allergies    No Known Allergies    Intolerances    Patient examined at bedside in chair.   Patient without complaints.   Denies CP, SOB, palpitations, or dizziness  No events on telemetry overnight    Vital Signs Last 24 Hrs  T(C): 36.3 (22 Jun 2021 05:44), Max: 36.3 (22 Jun 2021 05:44)  T(F): 97.3 (22 Jun 2021 05:44), Max: 97.3 (22 Jun 2021 05:44)  HR: 71 (22 Jun 2021 08:03) (68 - 71)  BP: 162/82 (22 Jun 2021 08:03) (152/95 - 172/97)  BP(mean): --  RR: 20 (22 Jun 2021 05:44) (18 - 20)  SpO2: 100% (22 Jun 2021 08:03) (97% - 100%)    MEDICATIONS  (STANDING):  albuterol/ipratropium for Nebulization. 3 milliLiter(s) Nebulizer once  aMIOdarone    Tablet 200 milliGRAM(s) Oral two times a day  aspirin  chewable 81 milliGRAM(s) Oral daily  atorvastatin 80 milliGRAM(s) Oral at bedtime  budesonide 160 MICROgram(s)/formoterol 4.5 MICROgram(s) Inhaler 2 Puff(s) Inhalation two times a day  chlorhexidine 4% Liquid 1 Application(s) Topical <User Schedule>  clopidogrel Tablet 75 milliGRAM(s) Oral daily  dextrose 40% Gel 15 Gram(s) Oral once  dextrose 5%. 1000 milliLiter(s) (50 mL/Hr) IV Continuous <Continuous>  dextrose 5%. 1000 milliLiter(s) (100 mL/Hr) IV Continuous <Continuous>  dextrose 50% Injectable 25 Gram(s) IV Push once  dextrose 50% Injectable 12.5 Gram(s) IV Push once  dextrose 50% Injectable 25 Gram(s) IV Push once  diVALproex  milliGRAM(s) Oral at bedtime  diVALproex  milliGRAM(s) Oral daily  glucagon  Injectable 1 milliGRAM(s) IntraMuscular once  insulin glargine Injectable (LANTUS) 12 Unit(s) SubCutaneous at bedtime  insulin lispro (ADMELOG) corrective regimen sliding scale   SubCutaneous three times a day before meals  insulin lispro Injectable (ADMELOG) 4 Unit(s) SubCutaneous three times a day before meals  LORazepam     Tablet 1 milliGRAM(s) Oral at bedtime  LORazepam     Tablet 0.5 milliGRAM(s) Oral <User Schedule>  magnesium oxide 400 milliGRAM(s) Oral three times a day with meals  metoprolol succinate ER 50 milliGRAM(s) Oral two times a day  polyethylene glycol 3350 17 Gram(s) Oral two times a day  sacubitril 49 mG/valsartan 51 mG 1 Tablet(s) Oral two times a day  senna 2 Tablet(s) Oral at bedtime  sodium chloride 0.9%. 1000 milliLiter(s) (75 mL/Hr) IV Continuous <Continuous>  torsemide 20 milliGRAM(s) Oral two times a day    MEDICATIONS  (PRN):  bisacodyl Suppository 10 milliGRAM(s) Rectal daily PRN Constipation  LORazepam     Tablet 1 milliGRAM(s) Oral at bedtime PRN Agitation      REVIEW OF SYSTEMS:          All negative except as mentioned in HPI    PHYSICAL EXAM:           CONSTITUTIONAL: Well-developed; well-nourished; in no acute distress  	SKIN: warm, dry  	HEAD: Normocephalic; atraumatic  	EYES: PERRL.  	ENT: No nasal discharge, airway clear, mucous membranes moist  	NECK: Supple; non tender.  	CARD: +S1, +S2, no murmurs, gallops, or rubs. Regular rate and rhythm    	RESP: No wheezes, rales or rhonchi. CTA B/L  	ABD: soft ntnd, + BS x 4 quadrants  	EXT: moves all extremities,  no clubbing, cyanosis or edema  	NEURO: Alert and oriented x3, no focal deficits          PSYCH: Cooperative, appropriate          VASCULAR:  + Rad / + PTs / + DPs          EXTREMITY:             Right Radial: pressure dressing removed, access site soft, no hematoma, no pain, + pulses, no sign of infection, no numbness            ECG: < from: 12 Lead ECG (06.22.21 @ 07:40) >    Ventricular Rate 72 BPM    Atrial Rate 72 BPM    P-R Interval 192 ms    QRS Duration 162 ms    Q-T Interval 498 ms    QTC Calculation(Bazett) 545 ms    P Axis 35 degrees    R Axis -88 degrees    T Axis 82 degrees    Diagnosis Line Normal sinus rhythm  Possible Left atrial enlargement  Left axis deviation  Non-specific intra-ventricular conduction block  Abnormal ECG                                                                                                                2D ECHO: < from: TTE Echo Complete w/o Contrast w/ Doppler (06.14.21 @ 14:28) >  Summary:   1. Left ventricular ejection fraction, by visual estimation, is 35 to 40%.   2.Moderately decreased global left ventricular systolic function.   3. Multiple left ventricular regional wall motion abnormalities exist. See wall motion findings.   4. Elevated left atrial and left ventricular end-diastolic pressures.   5. Mild concentric left ventricular hypertrophy.   6. Mildly increased LV wall thickness.   7. Normal left ventricular internal cavity size.   8. Spectral Doppler shows restrictive pattern of left ventricular myocardial filling (Grade III diastolic dysfunction).  9. Moderately reduced RV systolic function.  10. Mildly enlarged left atrium.  11. Moderately enlarged right atrium.  12. Small pericardial effusion.  13. Mild to moderate mitral valve regurgitation.  14. Moderate-severe tricuspid regurgitation.  15.Mild aortic regurgitation.  16. Sclerotic aortic valve with normal opening.  17. Estimated pulmonary artery systolic pressure is 50.0 mmHg assuming a right atrial pressure of 15 mmHg, which is consistent with moderate pulmonary hypertension.  18. Pulmonary hypertension is present.  19. LA volume Index is 36.7 ml/m² ml/m2.    LABS:                        12.4   5.42  )-----------( 249      ( 22 Jun 2021 05:47 )             39.7     06-22    140  |  97<L>  |  18  ----------------------------<  147<H>  3.7   |  31  |  0.9    Ca    9.4      22 Jun 2021 05:47  Mg     1.7     06-22    TPro  6.9  /  Alb  3.8  /  TBili  0.5  /  DBili  x   /  AST  17  /  ALT  9   /  AlkPhos  103  06-22        Magnesium, Serum: 1.7 mg/dL *L* [1.8 - 2.4] (06-22-21 @ 05:47)  LIVER FUNCTIONS - ( 22 Jun 2021 05:47 )  Alb: 3.8 g/dL / Pro: 6.9 g/dL / ALK PHOS: 103 U/L / ALT: 9 U/L / AST: 17 U/L / GGT: x             A/P:  I discussed the case with Cardiologist Dr. Murphy  and recommend the following:    S/P PCI 6/21:  INTERVENTION  IVUS: RCA  Successful PCI to Mid RCA with balloon angioplasty and SYNERGY 3.5 x 28 mm     POST-OP DIAGNOSIS  Significant RCA lesion, AUC score 7       	     Continue DAPT ( Aspirin 81 mg PO Daily and Plavix 75mg po daily ),  B-Blocker, Statin Therapy                   Patient given 30 day supply of ( Aspirin 81 mg daily and Plavix 75 mg daily ) to take at home                   HOLD metformin 48 hours post cardiac cath                   REINFORCE strict medication adherence with DAPT and other home medications.                    Monitor access site                   Patient agreeing to take DAPT for at least one year or as directed by cardiologist                    Pt given instructions on importance of taking antiplatelet medication or risk acute stent thrombosis/death                   Post cath instructions, access site care and activity restrictions reviewed with patient                     Discussed with patient to return to hospital if experience chest pain, shortness breath, dizziness and site bleeding                   Aggressive risk factor modification, diet counseling, smoking cessation discussed with patient                    Cardiac rehab info provided/referral and communication to cardiac rehab provided                      MAY discharge patient from cardiac standpoint this AFTERNOON IF BP controlled                   Follow up with Cardiology Dr. Jaja Combs  in two weeks.  Instructed to call and make an appointment

## 2021-06-22 NOTE — DISCHARGE NOTE NURSING/CASE MANAGEMENT/SOCIAL WORK - PATIENT PORTAL LINK FT
You can access the FollowMyHealth Patient Portal offered by [facility] by registering at the following website: [email or web address]. By joining [facility]’s FollowMyHealth portal, you will also be able to view your health information using other applications (apps) compatible with our system.

## 2021-06-22 NOTE — PROGRESS NOTE ADULT - PROVIDER SPECIALTY LIST ADULT
Heart Failure
Internal Medicine
Internal Medicine
Electrophysiology
Electrophysiology
Hospitalist
Intervent Cardiology
Heart Failure
Heart Failure
Hospitalist
Hospitalist
Internal Medicine
Heart Failure
Hospitalist
Hospitalist
Internal Medicine
Hospitalist
Internal Medicine

## 2021-06-28 DIAGNOSIS — E83.42 HYPOMAGNESEMIA: ICD-10-CM

## 2021-06-28 DIAGNOSIS — I47.1 SUPRAVENTRICULAR TACHYCARDIA: ICD-10-CM

## 2021-06-28 DIAGNOSIS — I13.0 HYPERTENSIVE HEART AND CHRONIC KIDNEY DISEASE WITH HEART FAILURE AND STAGE 1 THROUGH STAGE 4 CHRONIC KIDNEY DISEASE, OR UNSPECIFIED CHRONIC KIDNEY DISEASE: ICD-10-CM

## 2021-06-28 DIAGNOSIS — I25.118 ATHEROSCLEROTIC HEART DISEASE OF NATIVE CORONARY ARTERY WITH OTHER FORMS OF ANGINA PECTORIS: ICD-10-CM

## 2021-06-28 DIAGNOSIS — Z99.81 DEPENDENCE ON SUPPLEMENTAL OXYGEN: ICD-10-CM

## 2021-06-28 DIAGNOSIS — I34.0 NONRHEUMATIC MITRAL (VALVE) INSUFFICIENCY: ICD-10-CM

## 2021-06-28 DIAGNOSIS — Z91.14 PATIENT'S OTHER NONCOMPLIANCE WITH MEDICATION REGIMEN: ICD-10-CM

## 2021-06-28 DIAGNOSIS — E66.2 MORBID (SEVERE) OBESITY WITH ALVEOLAR HYPOVENTILATION: ICD-10-CM

## 2021-06-28 DIAGNOSIS — F17.210 NICOTINE DEPENDENCE, CIGARETTES, UNCOMPLICATED: ICD-10-CM

## 2021-06-28 DIAGNOSIS — J96.21 ACUTE AND CHRONIC RESPIRATORY FAILURE WITH HYPOXIA: ICD-10-CM

## 2021-06-28 DIAGNOSIS — F31.9 BIPOLAR DISORDER, UNSPECIFIED: ICD-10-CM

## 2021-06-28 DIAGNOSIS — I36.1 NONRHEUMATIC TRICUSPID (VALVE) INSUFFICIENCY: ICD-10-CM

## 2021-06-28 DIAGNOSIS — I50.23 ACUTE ON CHRONIC SYSTOLIC (CONGESTIVE) HEART FAILURE: ICD-10-CM

## 2021-06-28 DIAGNOSIS — Z99.3 DEPENDENCE ON WHEELCHAIR: ICD-10-CM

## 2021-06-28 DIAGNOSIS — I69.359 HEMIPLEGIA AND HEMIPARESIS FOLLOWING CEREBRAL INFARCTION AFFECTING UNSPECIFIED SIDE: ICD-10-CM

## 2021-06-28 DIAGNOSIS — N18.2 CHRONIC KIDNEY DISEASE, STAGE 2 (MILD): ICD-10-CM

## 2021-06-28 DIAGNOSIS — F41.9 ANXIETY DISORDER, UNSPECIFIED: ICD-10-CM

## 2021-06-28 DIAGNOSIS — E78.5 HYPERLIPIDEMIA, UNSPECIFIED: ICD-10-CM

## 2021-06-28 DIAGNOSIS — Z99.89 DEPENDENCE ON OTHER ENABLING MACHINES AND DEVICES: ICD-10-CM

## 2021-06-28 DIAGNOSIS — J44.9 CHRONIC OBSTRUCTIVE PULMONARY DISEASE, UNSPECIFIED: ICD-10-CM

## 2021-06-28 DIAGNOSIS — I35.1 NONRHEUMATIC AORTIC (VALVE) INSUFFICIENCY: ICD-10-CM

## 2021-06-28 DIAGNOSIS — I27.22 PULMONARY HYPERTENSION DUE TO LEFT HEART DISEASE: ICD-10-CM

## 2021-06-28 DIAGNOSIS — E11.22 TYPE 2 DIABETES MELLITUS WITH DIABETIC CHRONIC KIDNEY DISEASE: ICD-10-CM

## 2021-06-28 DIAGNOSIS — I42.8 OTHER CARDIOMYOPATHIES: ICD-10-CM

## 2021-07-22 ENCOUNTER — APPOINTMENT (OUTPATIENT)
Dept: CARDIOLOGY | Facility: CLINIC | Age: 56
End: 2021-07-22

## 2021-09-15 NOTE — PATIENT PROFILE ADULT - NSASFUNCLEVELADLTRANSFER_GEN_A_NUR
[facility] MEDICINE ADMISSION HISTORY AND PHYSICAL     Assessment & Plan      Patrick Green is a 85 year old old male with dementia that presented with complaints of leg pain, swelling, redness. Symptoms started after an abrasion climbing off of a ladder. Seen as outpt, started abx, symptoms worsening rapidly per family.     Assessment:  Severe skin/soft tissue infection  No systemic symptoms at this time  Will treat aggressively with vanco, zosyn, clindamycin  Monitor vitals, labs  Will consult surgery if not clinically improving or if labs concerning for NSTI    Dementia  Noted, watch for delirium  directable in ED    Anemia  Mild, asymptomatic, drop from 12-9 in last 3 months, no obvious evidence of acute loss  Will repeat hgb in morning  Will not use anticoagulation for this reason, on asa and plavix typically    Htn, CAD  Continue home bp meds, ASA, plavix, statin    DVT proph: aspirin, plavix  Code Status: DNR, discussed with family at time of admission, pt has advanced directive per family that indicates DNR/I status  Disposition: Inpatient   Diet:reg  Pain tx: tylenol  PT/OT: none now      Chief Complaint Leg redness, pain, swelling     HISTORY     Patrick Green is a 85 year old old male with dementia that presented with complaints of leg pain, swelling, redness. Symptoms started after an abrasion climbing off of a ladder. Seen as outpt, started abx, symptoms worsening rapidly per family.     Denies chest pain, dyspnea, abdominal pain, nausea, vomiting, diarrhea, dysuria. Leg is mildly painful. He can still move and his foot and feel his foot. Denies fever or chills.     Pt is confused, history from him is likely not completely reliable.     Past Medical History   Past Medical History:  No date: BPH (benign prostatic hyperplasia)  No date: CAD (coronary artery disease)  No date: Dementia (H)  No date: HLD (hyperlipidemia)   Surgical History     Past Surgical History:   Procedure  Laterality Date     APPENDECTOMY       COLONOSCOPY N/A 2018    Procedure: COLONOSCOPY;  Surgeon: Kathryn Infante MD;  Location: [facility];  Service: Gastroenterology     CORONARY STENT PLACEMENT      LAD and RCA     Family History    Reviewed, and   Family History   Problem Relation Age of Onset     Coronary Artery Disease Father       Social History      Social History     Tobacco Use     Smoking status: Former Smoker     Types: Cigarettes     Quit date: 2013     Years since quittin.7     Smokeless tobacco: Never Used   Substance Use Topics     Alcohol use: Yes     Alcohol/week: 1.0 standard drinks     Drug use: No     Comment: Drug use: unable to assess      Allergies     Allergies   Allergen Reactions     Diatrizoate Other (See Comments) and Rash     Anaphylactic reaction during 14 coronary angiogram per patient   Other reaction(s): Hypotension  Anaphylactic reaction during 14 coronary angiogram per patient   Anaphylactic reaction during 14 coronary angiogram per patient        Simvastatin Other (See Comments)     Muscle weakness  Other reaction(s): Muscle Weakness     Zocor [Simvastatin]      Prior to Admission Medications      Prior to Admission Medications   Prescriptions Last Dose Informant Patient Reported? Taking?   DOCOSAHEXANOIC ACID/EPA (FISH OIL ORAL) 9/15/2021 at Unknown time  Yes Yes   Sig: [DOCOSAHEXANOIC ACID/EPA (FISH OIL ORAL)] Take 1,500 mg by mouth daily.   Lidocaine (LIDOCARE) 4 % Patch Past Week at Unknown time  Yes Yes   Sig: Place 1 patch onto the skin daily as needed for moderate pain To prevent lidocaine toxicity, patient should be patch free for 12 hrs daily.   acetaminophen (TYLENOL EXTRA STRENGTH) 500 MG tablet Past Week at Unknown time  Yes Yes   Sig: Take 500-1,000 mg by mouth every 6 hours as needed    aspirin 81 MG EC tablet 9/15/2021 at am  Yes Yes   Sig: [ASPIRIN 81 MG EC TABLET] Take 81 mg by mouth daily.   atorvastatin (LIPITOR) 80 MG tablet  9/14/2021 at pm  Yes Yes   Sig: Take 80 mg by mouth At Bedtime   cephALEXin (KEFLEX) 500 MG capsule 9/15/2021 at am  Yes Yes   Sig: Take 500 mg by mouth 4 times daily   clopidogrel (PLAVIX) 75 MG tablet 9/14/2021 at pm  Yes Yes   Sig: Take 75 mg by mouth At Bedtime   donepezil (ARICEPT) 10 MG tablet 9/15/2021 at Unknown time  Yes Yes   Sig: [DONEPEZIL (ARICEPT) 10 MG TABLET] Take 10 mg by mouth every morning.    finasteride (PROSCAR) 5 mg tablet 9/15/2021 at Unknown time  Yes Yes   Sig: [FINASTERIDE (PROSCAR) 5 MG TABLET] Take 5 mg by mouth daily.    lisinopril (ZESTRIL) 5 MG tablet 9/15/2021 at am  Yes Yes   Sig: Take 5 mg by mouth daily   memantine (NAMENDA) 10 MG tablet 9/15/2021 at am  Yes Yes   Sig: Take 10 mg by mouth 2 times daily    metoprolol succinate (TOPROL-XL) 25 MG 9/15/2021 at am  Yes Yes   Sig: [METOPROLOL SUCCINATE (TOPROL-XL) 25 MG] Take 25 mg by mouth daily.    nitroglycerin (NITROSTAT) 0.4 MG SL tablet   Yes Yes   Sig: [NITROGLYCERIN (NITROSTAT) 0.4 MG SL TABLET] Place 0.4 mg under the tongue.   tamsulosin (FLOMAX) 0.4 mg Cp24 9/14/2021 at pm  Yes Yes   Sig: [TAMSULOSIN (FLOMAX) 0.4 MG CP24] Take 0.4 mg by mouth daily after supper.    vit C/E/Zn/coppr/lutein/zeaxan (PRESERVISION AREDS-2 ORAL) 9/15/2021 at am  Yes Yes   Sig: [VIT C/E/ZN/COPPR/LUTEIN/ZEAXAN (PRESERVISION AREDS-2 ORAL)] Take 1 tablet by mouth 2 (two) times a day.      Facility-Administered Medications: None      Review of Systems   A 12 point comprehensive review of systems was negative except as noted above in HPI.  PHYSICAL EXAMINATION     Vitals    Temp:  [97.3  F (36.3  C)] 97.3  F (36.3  C)  Pulse:  [62] 62  Resp:  [16] 16  BP: (117)/(60) 117/60  SpO2:  [97 %] 97 %  Examination   Physical Exam:    Gen: no acute distress, comfortable, alert, pleasant, forgetful  ENT: no scleral icterus  Pulm: lungs are clear without wheezing, crackles  CV: regular rate and rhythm, left leg edematous  GI: abdomen is soft, non-tender,  non-distended with active bowel sounds.  MSK: reviewed images from ED note, erythem and warmth spreading up to distal knee, leg is wrapped with gauze  Derm:warm, dry, mildly pale, no jaundice  Psych: appropriate affect    Patrick Spivey, [facility] Medicine  [facility]   Phone: #[phone]          3 = assistive equipment and person

## 2021-11-08 ENCOUNTER — INPATIENT (INPATIENT)
Facility: HOSPITAL | Age: 56
LOS: 9 days | Discharge: HOME | End: 2021-11-18
Attending: HOSPITALIST | Admitting: HOSPITALIST
Payer: MEDICARE

## 2021-11-08 VITALS
OXYGEN SATURATION: 98 % | DIASTOLIC BLOOD PRESSURE: 112 MMHG | HEIGHT: 59 IN | HEART RATE: 118 BPM | SYSTOLIC BLOOD PRESSURE: 167 MMHG | RESPIRATION RATE: 18 BRPM | TEMPERATURE: 99 F

## 2021-11-08 LAB
ALBUMIN SERPL ELPH-MCNC: 3.5 G/DL — SIGNIFICANT CHANGE UP (ref 3.5–5.2)
ALP SERPL-CCNC: 81 U/L — SIGNIFICANT CHANGE UP (ref 30–115)
ALT FLD-CCNC: 10 U/L — SIGNIFICANT CHANGE UP (ref 0–41)
ANION GAP SERPL CALC-SCNC: 18 MMOL/L — HIGH (ref 7–14)
AST SERPL-CCNC: 18 U/L — SIGNIFICANT CHANGE UP (ref 0–41)
BASOPHILS # BLD AUTO: 0.06 K/UL — SIGNIFICANT CHANGE UP (ref 0–0.2)
BASOPHILS NFR BLD AUTO: 0.9 % — SIGNIFICANT CHANGE UP (ref 0–1)
BILIRUB SERPL-MCNC: 1.8 MG/DL — HIGH (ref 0.2–1.2)
BUN SERPL-MCNC: 17 MG/DL — SIGNIFICANT CHANGE UP (ref 10–20)
CALCIUM SERPL-MCNC: 9.3 MG/DL — SIGNIFICANT CHANGE UP (ref 8.5–10.1)
CHLORIDE SERPL-SCNC: 96 MMOL/L — LOW (ref 98–110)
CO2 SERPL-SCNC: 22 MMOL/L — SIGNIFICANT CHANGE UP (ref 17–32)
CREAT SERPL-MCNC: 1 MG/DL — SIGNIFICANT CHANGE UP (ref 0.7–1.5)
EOSINOPHIL # BLD AUTO: 0.05 K/UL — SIGNIFICANT CHANGE UP (ref 0–0.7)
EOSINOPHIL NFR BLD AUTO: 0.7 % — SIGNIFICANT CHANGE UP (ref 0–8)
GLUCOSE SERPL-MCNC: 191 MG/DL — HIGH (ref 70–99)
HCG SERPL QL: NEGATIVE — SIGNIFICANT CHANGE UP
HCT VFR BLD CALC: 46.3 % — SIGNIFICANT CHANGE UP (ref 37–47)
HGB BLD-MCNC: 14.6 G/DL — SIGNIFICANT CHANGE UP (ref 12–16)
IMM GRANULOCYTES NFR BLD AUTO: 0.6 % — HIGH (ref 0.1–0.3)
LYMPHOCYTES # BLD AUTO: 1.11 K/UL — LOW (ref 1.2–3.4)
LYMPHOCYTES # BLD AUTO: 16.4 % — LOW (ref 20.5–51.1)
MCHC RBC-ENTMCNC: 29 PG — SIGNIFICANT CHANGE UP (ref 27–31)
MCHC RBC-ENTMCNC: 31.5 G/DL — LOW (ref 32–37)
MCV RBC AUTO: 92 FL — SIGNIFICANT CHANGE UP (ref 81–99)
MONOCYTES # BLD AUTO: 0.37 K/UL — SIGNIFICANT CHANGE UP (ref 0.1–0.6)
MONOCYTES NFR BLD AUTO: 5.5 % — SIGNIFICANT CHANGE UP (ref 1.7–9.3)
NEUTROPHILS # BLD AUTO: 5.14 K/UL — SIGNIFICANT CHANGE UP (ref 1.4–6.5)
NEUTROPHILS NFR BLD AUTO: 75.9 % — HIGH (ref 42.2–75.2)
NRBC # BLD: 0 /100 WBCS — SIGNIFICANT CHANGE UP (ref 0–0)
NT-PROBNP SERPL-SCNC: 7542 PG/ML — HIGH (ref 0–300)
PLATELET # BLD AUTO: 270 K/UL — SIGNIFICANT CHANGE UP (ref 130–400)
POTASSIUM SERPL-MCNC: 3.7 MMOL/L — SIGNIFICANT CHANGE UP (ref 3.5–5)
POTASSIUM SERPL-SCNC: 3.7 MMOL/L — SIGNIFICANT CHANGE UP (ref 3.5–5)
PROT SERPL-MCNC: 6.9 G/DL — SIGNIFICANT CHANGE UP (ref 6–8)
RBC # BLD: 5.03 M/UL — SIGNIFICANT CHANGE UP (ref 4.2–5.4)
RBC # FLD: 16.6 % — HIGH (ref 11.5–14.5)
SARS-COV-2 RNA SPEC QL NAA+PROBE: SIGNIFICANT CHANGE UP
SODIUM SERPL-SCNC: 136 MMOL/L — SIGNIFICANT CHANGE UP (ref 135–146)
TROPONIN T SERPL-MCNC: 0.02 NG/ML — HIGH
WBC # BLD: 6.77 K/UL — SIGNIFICANT CHANGE UP (ref 4.8–10.8)
WBC # FLD AUTO: 6.77 K/UL — SIGNIFICANT CHANGE UP (ref 4.8–10.8)

## 2021-11-08 PROCEDURE — 99223 1ST HOSP IP/OBS HIGH 75: CPT

## 2021-11-08 PROCEDURE — 99285 EMERGENCY DEPT VISIT HI MDM: CPT | Mod: 25

## 2021-11-08 PROCEDURE — 93010 ELECTROCARDIOGRAM REPORT: CPT

## 2021-11-08 PROCEDURE — 71045 X-RAY EXAM CHEST 1 VIEW: CPT | Mod: 26

## 2021-11-08 PROCEDURE — 36000 PLACE NEEDLE IN VEIN: CPT

## 2021-11-08 PROCEDURE — 93970 EXTREMITY STUDY: CPT | Mod: 26

## 2021-11-08 RX ORDER — BUDESONIDE AND FORMOTEROL FUMARATE DIHYDRATE 160; 4.5 UG/1; UG/1
2 AEROSOL RESPIRATORY (INHALATION)
Refills: 0 | Status: DISCONTINUED | OUTPATIENT
Start: 2021-11-08 | End: 2021-11-18

## 2021-11-08 RX ORDER — PANTOPRAZOLE SODIUM 20 MG/1
40 TABLET, DELAYED RELEASE ORAL
Refills: 0 | Status: DISCONTINUED | OUTPATIENT
Start: 2021-11-08 | End: 2021-11-18

## 2021-11-08 RX ORDER — INSULIN DEGLUDEC 100 U/ML
50 INJECTION, SOLUTION SUBCUTANEOUS
Qty: 0 | Refills: 0 | DISCHARGE

## 2021-11-08 RX ORDER — ALBUTEROL 90 UG/1
2 AEROSOL, METERED ORAL EVERY 6 HOURS
Refills: 0 | Status: DISCONTINUED | OUTPATIENT
Start: 2021-11-08 | End: 2021-11-18

## 2021-11-08 RX ORDER — SACUBITRIL AND VALSARTAN 24; 26 MG/1; MG/1
1 TABLET, FILM COATED ORAL
Refills: 0 | Status: DISCONTINUED | OUTPATIENT
Start: 2021-11-08 | End: 2021-11-18

## 2021-11-08 RX ORDER — FENOFIBRATE,MICRONIZED 130 MG
145 CAPSULE ORAL DAILY
Refills: 0 | Status: DISCONTINUED | OUTPATIENT
Start: 2021-11-08 | End: 2021-11-18

## 2021-11-08 RX ORDER — CLOPIDOGREL BISULFATE 75 MG/1
75 TABLET, FILM COATED ORAL DAILY
Refills: 0 | Status: DISCONTINUED | OUTPATIENT
Start: 2021-11-08 | End: 2021-11-10

## 2021-11-08 RX ORDER — CHLORHEXIDINE GLUCONATE 213 G/1000ML
1 SOLUTION TOPICAL
Refills: 0 | Status: DISCONTINUED | OUTPATIENT
Start: 2021-11-08 | End: 2021-11-18

## 2021-11-08 RX ORDER — ASPIRIN/CALCIUM CARB/MAGNESIUM 324 MG
81 TABLET ORAL DAILY
Refills: 0 | Status: DISCONTINUED | OUTPATIENT
Start: 2021-11-08 | End: 2021-11-10

## 2021-11-08 RX ORDER — FUROSEMIDE 40 MG
40 TABLET ORAL
Refills: 0 | Status: DISCONTINUED | OUTPATIENT
Start: 2021-11-08 | End: 2021-11-09

## 2021-11-08 RX ORDER — OMEGA-3 ACID ETHYL ESTERS 1 G
2 CAPSULE ORAL
Refills: 0 | Status: DISCONTINUED | OUTPATIENT
Start: 2021-11-08 | End: 2021-11-18

## 2021-11-08 RX ORDER — FUROSEMIDE 40 MG
40 TABLET ORAL ONCE
Refills: 0 | Status: COMPLETED | OUTPATIENT
Start: 2021-11-08 | End: 2021-11-08

## 2021-11-08 RX ORDER — ATORVASTATIN CALCIUM 80 MG/1
80 TABLET, FILM COATED ORAL AT BEDTIME
Refills: 0 | Status: DISCONTINUED | OUTPATIENT
Start: 2021-11-08 | End: 2021-11-18

## 2021-11-08 RX ORDER — METOPROLOL TARTRATE 50 MG
50 TABLET ORAL DAILY
Refills: 0 | Status: DISCONTINUED | OUTPATIENT
Start: 2021-11-08 | End: 2021-11-18

## 2021-11-08 RX ORDER — ENOXAPARIN SODIUM 100 MG/ML
40 INJECTION SUBCUTANEOUS DAILY
Refills: 0 | Status: DISCONTINUED | OUTPATIENT
Start: 2021-11-08 | End: 2021-11-10

## 2021-11-08 RX ADMIN — Medication 40 MILLIGRAM(S): at 12:35

## 2021-11-08 RX ADMIN — Medication 40 MILLIGRAM(S): at 23:59

## 2021-11-08 NOTE — H&P ADULT - NSHPPHYSICALEXAM_GEN_ALL_CORE
PHYSICAL EXAM:  GENERAL: NAD, speaks in full sentences, 5L NC   HEAD: Atraumatic  NECK: Supple  CHEST/LUNG: Clear to auscultation bilaterally  HEART: S1, S2; RRR; No murmurs, rubs, or gallops  ABDOMEN: BS+; Soft, Non-tender, Non-distended  EXTREMITIES:  2+ Peripheral Pulses, 2+ LE edema   PSYCH: AAOx3  NEUROLOGY: non-focal

## 2021-11-08 NOTE — H&P ADULT - ATTENDING COMMENTS
57 YO F with a PMH of chronic hypoxic respiratory failure from COPD (4L home O2), HFrEF w/ moderate pulm HTN, HTN, HLD, DM2, CVA (residual LE weakness; WC-bound), and depression who was BIBEMS for eval of progressively worsening B/L LE swelling for the past x 1-2 weeks. Associated with SOB. + orthopnea, - non-productive cough. Takes medications daily. Follows strict fluid/salt restriction diet. Denies any fevers/chills, CP, palpitations, N/V/D, ABD pain, dysuria, headaches, or rashes.     In the ED, Chest X-ray shows cardiomegaly w/ pulm vascular congestion. BNP elevated. Started on IV Lasix.     FMHx: Reviewed, not relevant    Physical exam shows obese pt in NAD. VSS, afebrile, not hypoxic on 5L NC. A&Ox3. Non-focal neuro exam. Muscle strength/sensation intact. CTA B/L. RRR, no M/G/R. ABD is soft and non-tender, normoactive BSs. B/L LEs with 2+ pitting edema that extends to the mid-shin. No rashes. Labs and radiology as above.    Dyspnea + B/L LE swelling due to acute on chronic HFrEF w/ moderate pulm HTN. IV Lasix BID and transition to PO. Echo. Monitor daily weights, Is&Os, and diet/fluid restriction. BMP in the AM. Restart home meds. HF consult.     Diabetes mellitus with hyperglycemia. A1c. FSs. Insulin standing/correctional dosing    Elevated troponin from HF exacerbation ("cardiac stretch"), doubt ACS. Serial cardiac enzymes and EKGs.     Hx of chronic hypoxic respiratory failure from COPD (4L home O2), HTN, HLD, CVA (residual LE weakness; WC-bound), and depression. Restart home meds, except as stated above. DVT PPX. Inform PCP of pt's admission to hospital. My note supersedes the residents note.

## 2021-11-08 NOTE — ED PROVIDER NOTE - PHYSICAL EXAMINATION
Constitutional: Well appearing. No acute distress. Non toxic.   Eyes: PERRLA. Extraocular movements intact, no entrapment. Conjunctiva normal.   ENT: No nasal discharge. Moist mucus membranes.  Neck: Supple, non tender, full range of motion.  CV: RRR no murmurs, rubs, or gallops. +S1S2.   Pulm: mildly diminished b/l bases and scattered crackles Normal work of breathing.  Abd: soft NT ND +BS.   Ext: Warm and well perfused x4, moving all extremities 2+ pitting edema to the bilateral lower extremities up to b/l thighs  Psy: Cooperative, appropriate.   Skin: Warm, dry, no rash  Neuro: CN2-12 grossly intact no sensory or motor deficits throughout

## 2021-11-08 NOTE — H&P ADULT - NSHPSOCIALHISTORY_GEN_ALL_CORE
Smokes 3-4 cigarettes per day, previously 1PPD for "her whole life"  denies drinking  Denies Recreational Drug use

## 2021-11-08 NOTE — ED ADULT NURSE REASSESSMENT NOTE - NS ED NURSE REASSESS COMMENT FT1
Pt received from previous shift AAOX4, spont breathing on 3 LPM O2 via NC. Pt is waiting in patient bed assignment. Linen changed, will continue to monitor.

## 2021-11-08 NOTE — ED PROVIDER NOTE - NS ED ROS FT
Constitutional:  See HPI.   Eyes:  No visual changes, eye pain or discharge.  ENMT:  No hearing changes, pain, discharge or infections. No neck pain or stiffness.  Cardiac:  No chest pain  GI:  No nausea, vomiting, diarrhea, abdominal pain.  :  No dysuria, frequency, hematuria  MS:  No joint pain or back pain.  Neuro:  No LOC. No headache or weakness.    Skin:  No skin rash.  Except as in HPI, all other review of systems is negative

## 2021-11-08 NOTE — ED PROVIDER NOTE - CLINICAL SUMMARY MEDICAL DECISION MAKING FREE TEXT BOX
57yo F history of HTN DL DM CAD PCI CHF CVA presenting with b/l LE swelling x2wks getting progressively worse. Mild SOB/ORR. Has been taking her lasix 40mg daily. No other acute complaints. No chest pain, abdominal pain, nausea/vomiting/diarrhea, fevers/chills. Sx gradual onset, moderate. No exac/relieving sx. labs ekg imaging reviewed. +fluid overloaded. diuresed. will admit for further eval

## 2021-11-08 NOTE — H&P ADULT - NSHPLABSRESULTS_GEN_ALL_CORE
VITALS:   T(F): 98.6  HR: 118  BP: 167/112  RR: 18  SpO2: 98%    LABS:                        14.6   6.77  )-----------( 270      ( 08 Nov 2021 11:27 )             46.3     11-08    136  |  96<L>  |  17  ----------------------------<  191<H>  3.7   |  22  |  1.0    Ca    9.3      08 Nov 2021 11:27    TPro  6.9  /  Alb  3.5  /  TBili  1.8<H>  /  DBili  x   /  AST  18  /  ALT  10  /  AlkPhos  81  11-08          Troponin T, Serum: 0.02 ng/mL *H* (11-08-21 @ 11:27)      CARDIAC MARKERS ( 08 Nov 2021 11:27 )  x     / 0.02 ng/mL / x     / x     / x

## 2021-11-08 NOTE — H&P ADULT - ASSESSMENT
56 year old female with a pmhx of chronic hypoxic respiratory failure (on 4L home O2) secondary to ( HFrEF 35-40%, GIII DD)  with pulmonary hypertension, HTN, HLD, DM2, active smoker , CVA with residual LE weakness (2014, wheelchair bound),  COPD , mood /depression,  presents of worsening shortness of breath and increase weight with  B/L LE over past week.     #Acute Hypoxic Respiratory Failure  #Acute on Chronic HFrEF exacerbation  #Hx of Pulmonary Hypertension   - Titrate oxygen to 4L, keep SpO2 > 92%  - ECHO  - c/w lasix 40mg BID, holding torsemide 20mg BID   - c/w entresto, toprol, statun   - accurate I + O  - fluid restriction to 1L, low salt diet   - Heart Failure consult     #CAD s/p PCI (6/2021)  - c/w aspirin and plavix     #Atrial Tachycardia  - seen by EP during admission in 6/21  - was started on amiodarone, has not picked up from pharmacy since 6/21  - will hold amiodarone   - c/w toprol     #HLD  - c/w Fenofibrate and Lipitor     #Hypertension  - uncontrolled   - c/w toprol, entresto     #Mood disorder  - c/w seroquil 100mg QHS  - no longer taking depakote     #COPD  - c/w Symbicort     #DM type 2  - holding Metformin and Glipizide   - start basal bolus if blood glucose > 180     #Vitamin D Deficiency   - c/w vit D 50,000 Q weekly     #Hx of CVA with residual LE weakness   - uses wheelchair to ambulate   - c/w aspirin and plavix     #DVT PPX: Lovenox   #GI PPX: Protonix   #activity as tolerated  #Diet: DASH   #dispo: acute

## 2021-11-08 NOTE — H&P ADULT - HISTORY OF PRESENT ILLNESS
56 year old female with a pmhx of chronic hypoxic respiratory failure (on 4L home O2) secondary to ( HFrEF 35-40%, GIII DD)  with pulmonary hypertension, HTN, HLD, DM2, active smoker , CVA with residual LE weakness (2014, wheelchair bound),  COPD , mood /depression,  presents of worsening shortness of breath and increase weight with  B/L LE over past week. Associated with increase oxygen use, mild SOB at rest orthopnea, and palpitations. She endorses compliance with home torsemide, as well as fluid restriction and low salt diet. She denies CP.   In ED VITALS: Temp 98.6F, , / 112, RR 18 Spo2 98 on 3L. Her O2 was increased to 5L due to feeling uncomfortable. CXR: increased bilateral opacities. BNP 7542, Troponin 0.02. Given lasix IV in ED. Admitted fort HFrEF exacerbation.

## 2021-11-08 NOTE — ED PROVIDER NOTE - OBJECTIVE STATEMENT
57yo F history of HTN DL DM CAD PCI CHF CVA presenting with b/l LE swelling x2wks getting progressively worse. Mild SOB/ORR. Has been taking her lasix 40mg daily. No other acute complaints. No chest pain, abdominal pain, nausea/vomiting/diarrhea, fevers/chills. Sx gradual onset, moderate. No exac/relieving sx.

## 2021-11-09 LAB
ALBUMIN SERPL ELPH-MCNC: 3.1 G/DL — LOW (ref 3.5–5.2)
ALP SERPL-CCNC: 86 U/L — SIGNIFICANT CHANGE UP (ref 30–115)
ALT FLD-CCNC: 9 U/L — SIGNIFICANT CHANGE UP (ref 0–41)
ANION GAP SERPL CALC-SCNC: 18 MMOL/L — HIGH (ref 7–14)
APTT BLD: 30.1 SEC — SIGNIFICANT CHANGE UP (ref 27–39.2)
AST SERPL-CCNC: 17 U/L — SIGNIFICANT CHANGE UP (ref 0–41)
BILIRUB SERPL-MCNC: 1.3 MG/DL — HIGH (ref 0.2–1.2)
BUN SERPL-MCNC: 18 MG/DL — SIGNIFICANT CHANGE UP (ref 10–20)
CALCIUM SERPL-MCNC: 9.1 MG/DL — SIGNIFICANT CHANGE UP (ref 8.5–10.1)
CHLORIDE SERPL-SCNC: 98 MMOL/L — SIGNIFICANT CHANGE UP (ref 98–110)
CO2 SERPL-SCNC: 22 MMOL/L — SIGNIFICANT CHANGE UP (ref 17–32)
COVID-19 NUCLEOCAPSID GAM AB INTERP: NEGATIVE — SIGNIFICANT CHANGE UP
COVID-19 NUCLEOCAPSID TOTAL GAM ANTIBODY RESULT: 0.08 INDEX — SIGNIFICANT CHANGE UP
COVID-19 SPIKE DOMAIN AB INTERP: POSITIVE
COVID-19 SPIKE DOMAIN ANTIBODY RESULT: >250 U/ML — HIGH
CREAT SERPL-MCNC: 1 MG/DL — SIGNIFICANT CHANGE UP (ref 0.7–1.5)
D DIMER BLD IA.RAPID-MCNC: 797 NG/ML DDU — HIGH (ref 0–230)
GLUCOSE BLDC GLUCOMTR-MCNC: 170 MG/DL — HIGH (ref 70–99)
GLUCOSE SERPL-MCNC: 134 MG/DL — HIGH (ref 70–99)
HCT VFR BLD CALC: 44.4 % — SIGNIFICANT CHANGE UP (ref 37–47)
HGB BLD-MCNC: 14 G/DL — SIGNIFICANT CHANGE UP (ref 12–16)
INR BLD: 1.18 RATIO — SIGNIFICANT CHANGE UP (ref 0.65–1.3)
MAGNESIUM SERPL-MCNC: 1.5 MG/DL — LOW (ref 1.8–2.4)
MCHC RBC-ENTMCNC: 29.1 PG — SIGNIFICANT CHANGE UP (ref 27–31)
MCHC RBC-ENTMCNC: 31.5 G/DL — LOW (ref 32–37)
MCV RBC AUTO: 92.3 FL — SIGNIFICANT CHANGE UP (ref 81–99)
NRBC # BLD: 0 /100 WBCS — SIGNIFICANT CHANGE UP (ref 0–0)
PLATELET # BLD AUTO: 243 K/UL — SIGNIFICANT CHANGE UP (ref 130–400)
POTASSIUM SERPL-MCNC: 3.8 MMOL/L — SIGNIFICANT CHANGE UP (ref 3.5–5)
POTASSIUM SERPL-SCNC: 3.8 MMOL/L — SIGNIFICANT CHANGE UP (ref 3.5–5)
PROT SERPL-MCNC: 6.3 G/DL — SIGNIFICANT CHANGE UP (ref 6–8)
PROTHROM AB SERPL-ACNC: 13.6 SEC — HIGH (ref 9.95–12.87)
RBC # BLD: 4.81 M/UL — SIGNIFICANT CHANGE UP (ref 4.2–5.4)
RBC # FLD: 16.7 % — HIGH (ref 11.5–14.5)
SARS-COV-2 IGG+IGM SERPL QL IA: 0.08 INDEX — SIGNIFICANT CHANGE UP
SARS-COV-2 IGG+IGM SERPL QL IA: >250 U/ML — HIGH
SARS-COV-2 IGG+IGM SERPL QL IA: NEGATIVE — SIGNIFICANT CHANGE UP
SARS-COV-2 IGG+IGM SERPL QL IA: POSITIVE
SODIUM SERPL-SCNC: 138 MMOL/L — SIGNIFICANT CHANGE UP (ref 135–146)
TROPONIN T SERPL-MCNC: 0.05 NG/ML — CRITICAL HIGH
WBC # BLD: 7.49 K/UL — SIGNIFICANT CHANGE UP (ref 4.8–10.8)
WBC # FLD AUTO: 7.49 K/UL — SIGNIFICANT CHANGE UP (ref 4.8–10.8)

## 2021-11-09 PROCEDURE — 93010 ELECTROCARDIOGRAM REPORT: CPT

## 2021-11-09 PROCEDURE — 71045 X-RAY EXAM CHEST 1 VIEW: CPT | Mod: 26

## 2021-11-09 PROCEDURE — 99223 1ST HOSP IP/OBS HIGH 75: CPT

## 2021-11-09 RX ORDER — SODIUM CHLORIDE 5 G/100ML
500 INJECTION, SOLUTION INTRAVENOUS
Refills: 0 | Status: DISCONTINUED | OUTPATIENT
Start: 2021-11-09 | End: 2021-11-09

## 2021-11-09 RX ORDER — BUMETANIDE 0.25 MG/ML
1 INJECTION INTRAMUSCULAR; INTRAVENOUS
Qty: 20 | Refills: 0 | Status: DISCONTINUED | OUTPATIENT
Start: 2021-11-09 | End: 2021-11-09

## 2021-11-09 RX ORDER — SODIUM CHLORIDE 5 G/100ML
150 INJECTION, SOLUTION INTRAVENOUS
Refills: 0 | Status: COMPLETED | OUTPATIENT
Start: 2021-11-09 | End: 2021-11-09

## 2021-11-09 RX ORDER — MAGNESIUM OXIDE 400 MG ORAL TABLET 241.3 MG
400 TABLET ORAL
Refills: 0 | Status: COMPLETED | OUTPATIENT
Start: 2021-11-09 | End: 2021-11-10

## 2021-11-09 RX ORDER — MAGNESIUM SULFATE 500 MG/ML
2 VIAL (ML) INJECTION ONCE
Refills: 0 | Status: COMPLETED | OUTPATIENT
Start: 2021-11-09 | End: 2021-11-09

## 2021-11-09 RX ORDER — BUMETANIDE 0.25 MG/ML
2 INJECTION INTRAMUSCULAR; INTRAVENOUS ONCE
Refills: 0 | Status: DISCONTINUED | OUTPATIENT
Start: 2021-11-09 | End: 2021-11-09

## 2021-11-09 RX ORDER — BUMETANIDE 0.25 MG/ML
1 INJECTION INTRAMUSCULAR; INTRAVENOUS
Qty: 20 | Refills: 0 | Status: COMPLETED | OUTPATIENT
Start: 2021-11-09 | End: 2021-11-10

## 2021-11-09 RX ORDER — BUMETANIDE 0.25 MG/ML
2 INJECTION INTRAMUSCULAR; INTRAVENOUS ONCE
Refills: 0 | Status: COMPLETED | OUTPATIENT
Start: 2021-11-09 | End: 2021-11-09

## 2021-11-09 RX ORDER — MAGNESIUM OXIDE 400 MG ORAL TABLET 241.3 MG
400 TABLET ORAL ONCE
Refills: 0 | Status: COMPLETED | OUTPATIENT
Start: 2021-11-09 | End: 2021-11-09

## 2021-11-09 RX ORDER — FUROSEMIDE 40 MG
40 TABLET ORAL
Refills: 0 | Status: DISCONTINUED | OUTPATIENT
Start: 2021-11-09 | End: 2021-11-10

## 2021-11-09 RX ORDER — MAGNESIUM SULFATE 500 MG/ML
2 VIAL (ML) INJECTION ONCE
Refills: 0 | Status: DISCONTINUED | OUTPATIENT
Start: 2021-11-09 | End: 2021-11-09

## 2021-11-09 RX ADMIN — SACUBITRIL AND VALSARTAN 1 TABLET(S): 24; 26 TABLET, FILM COATED ORAL at 18:08

## 2021-11-09 RX ADMIN — CHLORHEXIDINE GLUCONATE 1 APPLICATION(S): 213 SOLUTION TOPICAL at 07:31

## 2021-11-09 RX ADMIN — Medication 81 MILLIGRAM(S): at 11:59

## 2021-11-09 RX ADMIN — Medication 25 GRAM(S): at 11:57

## 2021-11-09 RX ADMIN — Medication 145 MILLIGRAM(S): at 11:58

## 2021-11-09 RX ADMIN — CLOPIDOGREL BISULFATE 75 MILLIGRAM(S): 75 TABLET, FILM COATED ORAL at 11:58

## 2021-11-09 RX ADMIN — BUMETANIDE 5 MG/HR: 0.25 INJECTION INTRAMUSCULAR; INTRAVENOUS at 21:27

## 2021-11-09 RX ADMIN — BUDESONIDE AND FORMOTEROL FUMARATE DIHYDRATE 2 PUFF(S): 160; 4.5 AEROSOL RESPIRATORY (INHALATION) at 09:21

## 2021-11-09 RX ADMIN — ATORVASTATIN CALCIUM 80 MILLIGRAM(S): 80 TABLET, FILM COATED ORAL at 21:26

## 2021-11-09 RX ADMIN — Medication 40 MILLIGRAM(S): at 07:45

## 2021-11-09 RX ADMIN — Medication 40 MILLIGRAM(S): at 18:07

## 2021-11-09 RX ADMIN — Medication 50 MILLIGRAM(S): at 09:19

## 2021-11-09 RX ADMIN — PANTOPRAZOLE SODIUM 40 MILLIGRAM(S): 20 TABLET, DELAYED RELEASE ORAL at 07:45

## 2021-11-09 RX ADMIN — SODIUM CHLORIDE 50 MILLILITER(S): 5 INJECTION, SOLUTION INTRAVENOUS at 21:27

## 2021-11-09 RX ADMIN — ENOXAPARIN SODIUM 40 MILLIGRAM(S): 100 INJECTION SUBCUTANEOUS at 11:57

## 2021-11-09 RX ADMIN — BUMETANIDE 2 MILLIGRAM(S): 0.25 INJECTION INTRAMUSCULAR; INTRAVENOUS at 22:30

## 2021-11-09 RX ADMIN — MAGNESIUM OXIDE 400 MG ORAL TABLET 400 MILLIGRAM(S): 241.3 TABLET ORAL at 22:31

## 2021-11-09 RX ADMIN — SACUBITRIL AND VALSARTAN 1 TABLET(S): 24; 26 TABLET, FILM COATED ORAL at 09:20

## 2021-11-09 NOTE — CONSULT NOTE ADULT - SUBJECTIVE AND OBJECTIVE BOX
Date of Admission: 21    HISTORY OF PRESENT ILLNESS:     56 year old female with a pmhx of chronic hypoxic respiratory failure (on 4L home O2) secondary to ( HFrEF 35-40%, GIII DD)  with pulmonary hypertension, HTN, HLD, DM2, active smoker , CVA with residual LE weakness (2014, wheelchair bound),  COPD , mood /depression,  presents of worsening shortness of breath and increase weight with  B/L LE over past week. Associated with increase oxygen use, mild SOB at rest orthopnea, and palpitations. She endorses compliance with home torsemide, as well as fluid restriction and low salt diet. She denies CP.     Patient resting in bed, c/o generalized pain and abdominal discomfort, reports for couple of days she had worsening SOB, and was feeling bloated. Patient is not very active however she was SOB with minimal exertion. Patient denies c/p, palpitations, headaches, bleeding, LH, dizziness, orthopnea, PND, LOC, cough. Endorses compliance with medications and follows a low sodium diet       PAST MEDICAL & SURGICAL HISTORY:      Hypertension  Hyperlipidemia  Anxiety and depression  COPD, severe  CHF (congestive heart failure)  Cerebrovascular accident (CVA)Multiple  Type 2 diabetes mellitus  CKD (chronic kidney disease), stage II  No significant past surgical history        FAMILY HISTORY:  No pertinent family history of premature cardiovascular disease in first degree relatives.  Mother:  of cancer at 65  Father:  of an overdose at 50    SOCIAL HISTORY:      Current every day smoker, 2 cigarettes a day, denies alcohol or drug use    Allergies    No Known Allergies    Intolerances    	    REVIEW OF SYSTEMS:  CONSTITUTIONAL: No fever, weight loss, or fatigue  CARDIOLOGY: denies chest pain, + shortness of breath or syncopal episodes.   RESPIRATORY: + shortness of breath, + wheezing.   NEUROLOGICAL: no weakness, no focal deficits to report.  ENDOCRINOLOGICAL: no recent change in diabetic medications.   GI: no BRBPR, no N,V, diarrhea.    PSYCHIATRY: normal mood and affect  HEENT: no nasal discharge, no ecchymosis  SKIN: no ecchymosis, no breakdown  MUSCULOSKELETAL: Full range of motion x4. WC bound    PHYSICAL EXAM:  General Appearance: well appearing, normal for age and gender. 	  Neck: unable to assess due to body habitus JVP, no bruit.   Eyes: Extra Ocular muscles intact.   Cardiovascular: regular rate and rhythm S1 S2, , No murmurs, Generalized edema  Respiratory: B/L expiratory wheezing  Psychiatry: Alert and oriented x 3, Mood & affect appropriate  Gastrointestinal:  Soft, Non-tender  Skin/Integumentary : No rashes, No ecchymoses, No cyanosis	  Neurologic: Non-focal  Musculoskeletal/ extremities: Normal range of motion, No clubbing, cyanosis   Vascular: Peripheral pulses palpable 2+ bilaterally    CARDIAC MARKERS:    Serum Pro-Brain Natriuretic Peptide: 7542 pg/mL (21 @ 11:27)      TELEMETRY EVENTS: 	    EC/8/21    Ventricular Rate 72 BPM  Atrial Rate 72 BPM  P-R Interval 192 ms  QRS Duration 162 ms  Q-T Interval 498 ms  QTC Calculation(Bazett) 545 ms  P Axis 35 degrees  R Axis -88 degrees  T Axis 82 degrees  Diagnosis Line Normal sinus rhythm  Possible Left atrial enlargement  Left axis deviation  Non-specific intra-ventricular conduction block  Abnormal ECG  Confirmed by CARLOTA GAUTHIER MD ([id number]) on 2021 8:55:01 AM      PREVIOUS DIAGNOSTIC TESTING:      TTE 21    Summary:   1. Left ventricular ejection fraction, by visual estimation, is 35 to 40%.   2.Moderately decreased global left ventricular systolic function.   3. Multiple left ventricular regional wall motion abnormalities exist. See wall motion findings.   4. Elevated left atrial and left ventricular end-diastolic pressures.   5. Mild concentric left ventricular hypertrophy.   6. Mildly increased LV wall thickness.   7. Normal left ventricular internal cavity size.   8. Spectral Doppler shows restrictive pattern of left ventricular myocardial filling (Grade III diastolic dysfunction).  9. Moderately reduced RV systolic function.  10. Mildly enlarged left atrium.  11. Moderately enlarged right atrium.  12. Small pericardial effusion.  13. Mild to moderate mitral valve regurgitation.  14. Moderate-severe tricuspid regurgitation.  15.Mild aortic regurgitation.  16. Sclerotic aortic valve with normal opening.  17. Estimated pulmonary artery systolic pressure is 50.0 mmHg assuming a right atrial pressure of 15 mmHg, which is consistent with moderate pulmonary hypertension.  18. Pulmonary hypertension is present.  19. LA volume Index is 36.7 ml/m² ml/m2.    	    Home Medications:  Albuterol (Eqv-ProAir HFA) 90 mcg/inh inhalation aerosol: 2 puff(s) inhaled every 6 hours, As Needed (2021 11:22)  aspirin 81 mg oral tablet: 1 tab(s) orally once a day (2021 11:22)  fenofibrate 145 mg oral tablet: 1 tab(s) orally once a day (2021 11:22)  glipiZIDE 10 mg oral tablet: 1 tab(s) orally 2 times a day (2021 11:22)  Lovaza 1000 mg oral capsule: 2 cap(s) orally 2 times a day (2021 14:52)  metFORMIN 1000 mg oral tablet: 1 tab(s) orally 2 times a day Do not take until  (2021 12:58)  Plavix 75 mg oral tablet: 1 tab(s) orally once a day (2021 11:22)  Vitamin D2 1.25 mg (50,000 intl units) oral capsule: 1 cap(s) orally once a week (2021 14:52)    MEDICATIONS  (STANDING):  aspirin  chewable 81 milliGRAM(s) Oral daily  atorvastatin 80 milliGRAM(s) Oral at bedtime  budesonide 160 MICROgram(s)/formoterol 4.5 MICROgram(s) Inhaler 2 Puff(s) Inhalation two times a day  chlorhexidine 4% Liquid 1 Application(s) Topical <User Schedule>  clopidogrel Tablet 75 milliGRAM(s) Oral daily  enoxaparin Injectable 40 milliGRAM(s) SubCutaneous daily  fenofibrate Tablet 145 milliGRAM(s) Oral daily  furosemide   Injectable 40 milliGRAM(s) IV Push two times a day  magnesium sulfate  IVPB 2 Gram(s) IV Intermittent once  metoprolol succinate ER 50 milliGRAM(s) Oral daily  omega-3-Acid Ethyl Esters 2 Gram(s) Oral two times a day  pantoprazole    Tablet 40 milliGRAM(s) Oral before breakfast  sacubitril 49 mG/valsartan 51 mG 1 Tablet(s) Oral two times a day    MEDICATIONS  (PRN):  ALBUTerol    90 MICROgram(s) HFA Inhaler 2 Puff(s) Inhalation every 6 hours PRN Shortness of Breath and/or Wheezing

## 2021-11-09 NOTE — CHART NOTE - NSCHARTNOTEFT_GEN_A_CORE
`MICU Transfer Note    Transfer from: medicine   Transfer to:  telemetry       Hospital COURSE:    56 year old female with a pmhx of chronic hypoxic respiratory failure (on 4L home O2) secondary to ( HFrEF 35-40%, GIII DD)  with pulmonary hypertension, HTN, HLD, DM2, active smoker , CVA with residual LE weakness (2014, wheelchair bound),  COPD , mood /depression,  presents of worsening shortness of breath and increase weight with  B/L LE over past week.   Associated with increase oxygen use, mild SOB at rest orthopnea, and palpitations. She endorses compliance with home torsemide, as well as fluid restriction and low salt diet. She denies CP.     In ED VITALS: Temp 98.6F, , / 112, RR 18 Spo2 98 on 3L. Her O2 was increased to 5L due to feeling uncomfortable. CXR: increased bilateral opacities. BNP 7542, Troponin 0.02. Given lasix IV in ED. Admitted fort HFrEF exacerbation.       admitted to floor with acute on chronic HF     Floor course   on 4l NC with good saturation   seen by heart failure team   recommend transfer to telemetry   - bumex IV 2mg push once   - bumex infusion at 1mg/hr   - hypertonic saline 150ml BID, (over 1 hour if through central line, over 3 hours if through peripheral)   - obtain BMP q12   - strict I/os   - daily weights       ASSESSMENT & PLAN:     56 year old female with a pmhx of chronic hypoxic respiratory failure (on 4L home O2) secondary to ( HFrEF 35-40%, GIII DD)  with pulmonary hypertension, HTN, HLD, DM2, active smoker , CVA with residual LE weakness (2014, wheelchair bound),  COPD , mood /depression,  presents of worsening shortness of breath and increase weight with  B/L LE over past week.     #Acute Hypoxic Respiratory Failure  #Acute on Chronic HFrEF exacerbation  #Hx of Pulmonary Hypertension   - Titrate oxygen to 4L, keep SpO2 > 92%  - ECHO  - c/w entresto, toprol, statun   - accurate I + O  - fluid restriction to 1L, low salt diet   - Heart Failure consult   - bumex iv 2mg push once   - bumex infusion 1mg/hr   - hypertonic saline 150ml/3 hours if peripheral line BID   - monitor BMP q12 hr   - K> 4, mag > 2.1     #CAD s/p PCI (6/2021)  - c/w aspirin and plavix     #Atrial Tachycardia  - seen by EP during admission in 6/21  - was started on amiodarone, has not picked up from pharmacy since 6/21  - will hold amiodarone   - c/w toprol     #HLD  - c/w Fenofibrate and Lipitor     #Hypertension  - uncontrolled   - c/w toprol, entresto     #Mood disorder  - c/w seroquil 100mg QHS  - no longer taking depakote     #COPD  - c/w Symbicort     #DM type 2  - holding Metformin and Glipizide   - start basal bolus if blood glucose > 180     #Vitamin D Deficiency   - c/w vit D 50,000 Q weekly     #Hx of CVA with residual LE weakness   - uses wheelchair to ambulate   - c/w aspirin and plavix     #DVT PPX: Lovenox   #GI PPX: Protonix   #activity as tolerated  #Diet: DASH   #dispo: acute             For Follow-Up:  - bmp at 8pm   - echo  - HF recs   - orders for hypertonic saline placed for today only.         Vital Signs Last 24 Hrs  T(C): 36.1 (09 Nov 2021 07:59), Max: 36.1 (09 Nov 2021 07:59)  T(F): 97 (09 Nov 2021 07:59), Max: 97 (09 Nov 2021 07:59)  HR: 88 (09 Nov 2021 07:59) (88 - 89)  BP: 150/80 (09 Nov 2021 09:30) (140/91 - 172/112)  BP(mean): --  RR: 18 (09 Nov 2021 09:30) (18 - 18)  SpO2: 100% (09 Nov 2021 09:30) (98% - 100%)  I&O's Summary    09 Nov 2021 07:01  -  09 Nov 2021 12:37  --------------------------------------------------------  IN: 700 mL / OUT: 800 mL / NET: -100 mL          MEDICATIONS  (STANDING):  aspirin  chewable 81 milliGRAM(s) Oral daily  atorvastatin 80 milliGRAM(s) Oral at bedtime  budesonide 160 MICROgram(s)/formoterol 4.5 MICROgram(s) Inhaler 2 Puff(s) Inhalation two times a day  buMETAnide Infusion 1 mG/Hr (5 mL/Hr) IV Continuous <Continuous>  buMETAnide Injectable 2 milliGRAM(s) IV Push once  chlorhexidine 4% Liquid 1 Application(s) Topical <User Schedule>  clopidogrel Tablet 75 milliGRAM(s) Oral daily  enoxaparin Injectable 40 milliGRAM(s) SubCutaneous daily  fenofibrate Tablet 145 milliGRAM(s) Oral daily  magnesium sulfate  IVPB 2 Gram(s) IV Intermittent once  metoprolol succinate ER 50 milliGRAM(s) Oral daily  omega-3-Acid Ethyl Esters 2 Gram(s) Oral two times a day  pantoprazole    Tablet 40 milliGRAM(s) Oral before breakfast  sacubitril 49 mG/valsartan 51 mG 1 Tablet(s) Oral two times a day  sodium chloride 3%. 500 milliLiter(s) (150 mL/Hr) IV Continuous <Continuous>  sodium chloride 3%. 500 milliLiter(s) (150 mL/Hr) IV Continuous <Continuous>    MEDICATIONS  (PRN):  ALBUTerol    90 MICROgram(s) HFA Inhaler 2 Puff(s) Inhalation every 6 hours PRN Shortness of Breath and/or Wheezing        LABS                                            14.0                  Neurophils% (auto):   x      (11-09 @ 06:19):    7.49 )-----------(243          Lymphocytes% (auto):  x                                             44.4                   Eosinphils% (auto):   x        Manual%: Neutrophils x    ; Lymphocytes x    ; Eosinophils x    ; Bands%: x    ; Blasts x                                    138    |  98     |  18                  Calcium: 9.1   / iCa: x      (11-09 @ 06:19)    ----------------------------<  134       Magnesium: 1.5                              3.8     |  22     |  1.0              Phosphorous: x        TPro  6.3    /  Alb  3.1    /  TBili  1.3    /  DBili  x      /  AST  17     /  ALT  9      /  AlkPhos  86     09 Nov 2021 06:19    ( 11-09 @ 06:19 )   PT: 13.60 sec;   INR: 1.18 ratio  aPTT: 30.1 sec `MICU Transfer Note    Transfer from: medicine   Transfer to:  telemetry       Hospital COURSE:    56 year old female with a pmhx of chronic hypoxic respiratory failure (on 4L home O2) secondary to ( HFrEF 35-40%, GIII DD)  with pulmonary hypertension, HTN, HLD, DM2, active smoker , CVA with residual LE weakness (2014, wheelchair bound),  COPD , mood /depression,  presents of worsening shortness of breath and increase weight with  B/L LE over past week.   Associated with increase oxygen use, mild SOB at rest orthopnea, and palpitations. She endorses compliance with home torsemide, as well as fluid restriction and low salt diet. She denies CP.     In ED VITALS: Temp 98.6F, , / 112, RR 18 Spo2 98 on 3L. Her O2 was increased to 5L due to feeling uncomfortable. CXR: increased bilateral opacities. BNP 7542, Troponin 0.02. Given lasix IV in ED. Admitted fort HFrEF exacerbation.       admitted to floor with acute on chronic HF     Floor course     on 4l NC with good saturation   currently being treated with IV lasix 40mg BID     seen by heart failure team   recommend transfer to telemetry and start the following once transferred -   - bumex IV 2mg push once   - bumex infusion at 1mg/hr   - hypertonic saline 150ml BID, (over 1 hour if through central line, over 3 hours if through peripheral)   - obtain BMP q12   - strict I/os   - daily weights       ASSESSMENT & PLAN:     56 year old female with a pmhx of chronic hypoxic respiratory failure (on 4L home O2) secondary to ( HFrEF 35-40%, GIII DD)  with pulmonary hypertension, HTN, HLD, DM2, active smoker , CVA with residual LE weakness (2014, wheelchair bound),  COPD , mood /depression,  presents of worsening shortness of breath and increase weight with  B/L LE over past week.     #Acute Hypoxic Respiratory Failure  #Acute on Chronic HFrEF exacerbation  #Hx of Pulmonary Hypertension   - Titrate oxygen to 4L, keep SpO2 > 92%  - ECHO  - c/w entresto, toprol, statun   - accurate I + O  - fluid restriction to 1L, low salt diet   - currently lasix 40mg bid   - Heart Failure consult   to start once on tele --   - bumex iv 2mg push once   - bumex infusion 1mg/hr   - hypertonic saline 150ml/3 hours if peripheral line BID   - monitor BMP q12 hr   - K> 4, mag > 2.1     #CAD s/p PCI (6/2021)  - c/w aspirin and plavix     #Atrial Tachycardia  - seen by EP during admission in 6/21  - was started on amiodarone, has not picked up from pharmacy since 6/21  - will hold amiodarone   - c/w toprol     #HLD  - c/w Fenofibrate and Lipitor     #Hypertension  - uncontrolled   - c/w toprol, entresto     #Mood disorder  - c/w seroquil 100mg QHS  - no longer taking depakote     #COPD  - c/w Symbicort     #DM type 2  - holding Metformin and Glipizide   - start basal bolus if blood glucose > 180     #Vitamin D Deficiency   - c/w vit D 50,000 Q weekly     #Hx of CVA with residual LE weakness   - uses wheelchair to ambulate   - c/w aspirin and plavix     #DVT PPX: Lovenox   #GI PPX: Protonix   #activity as tolerated  #Diet: DASH   #dispo: acute             For Follow-Up:  - please place orders recommended by HF once transferred,( cannot be started on the floor)  - bmp at 8pm   - echo  - fu with HF team     Vital Signs Last 24 Hrs  T(C): 36.1 (09 Nov 2021 07:59), Max: 36.1 (09 Nov 2021 07:59)  T(F): 97 (09 Nov 2021 07:59), Max: 97 (09 Nov 2021 07:59)  HR: 88 (09 Nov 2021 07:59) (88 - 89)  BP: 150/80 (09 Nov 2021 09:30) (140/91 - 172/112)  BP(mean): --  RR: 18 (09 Nov 2021 09:30) (18 - 18)  SpO2: 100% (09 Nov 2021 09:30) (98% - 100%)  I&O's Summary    09 Nov 2021 07:01  -  09 Nov 2021 12:37  --------------------------------------------------------  IN: 700 mL / OUT: 800 mL / NET: -100 mL          MEDICATIONS  (STANDING):  aspirin  chewable 81 milliGRAM(s) Oral daily  atorvastatin 80 milliGRAM(s) Oral at bedtime  budesonide 160 MICROgram(s)/formoterol 4.5 MICROgram(s) Inhaler 2 Puff(s) Inhalation two times a day  buMETAnide Infusion 1 mG/Hr (5 mL/Hr) IV Continuous <Continuous>  buMETAnide Injectable 2 milliGRAM(s) IV Push once  chlorhexidine 4% Liquid 1 Application(s) Topical <User Schedule>  clopidogrel Tablet 75 milliGRAM(s) Oral daily  enoxaparin Injectable 40 milliGRAM(s) SubCutaneous daily  fenofibrate Tablet 145 milliGRAM(s) Oral daily  magnesium sulfate  IVPB 2 Gram(s) IV Intermittent once  metoprolol succinate ER 50 milliGRAM(s) Oral daily  omega-3-Acid Ethyl Esters 2 Gram(s) Oral two times a day  pantoprazole    Tablet 40 milliGRAM(s) Oral before breakfast  sacubitril 49 mG/valsartan 51 mG 1 Tablet(s) Oral two times a day  sodium chloride 3%. 500 milliLiter(s) (150 mL/Hr) IV Continuous <Continuous>  sodium chloride 3%. 500 milliLiter(s) (150 mL/Hr) IV Continuous <Continuous>    MEDICATIONS  (PRN):  ALBUTerol    90 MICROgram(s) HFA Inhaler 2 Puff(s) Inhalation every 6 hours PRN Shortness of Breath and/or Wheezing        LABS                                            14.0                  Neurophils% (auto):   x      (11-09 @ 06:19):    7.49 )-----------(243          Lymphocytes% (auto):  x                                             44.4                   Eosinphils% (auto):   x        Manual%: Neutrophils x    ; Lymphocytes x    ; Eosinophils x    ; Bands%: x    ; Blasts x                                    138    |  98     |  18                  Calcium: 9.1   / iCa: x      (11-09 @ 06:19)    ----------------------------<  134       Magnesium: 1.5                              3.8     |  22     |  1.0              Phosphorous: x        TPro  6.3    /  Alb  3.1    /  TBili  1.3    /  DBili  x      /  AST  17     /  ALT  9      /  AlkPhos  86     09 Nov 2021 06:19    ( 11-09 @ 06:19 )   PT: 13.60 sec;   INR: 1.18 ratio  aPTT: 30.1 sec `MICU Transfer Note    Transfer from: medicine   Transfer to:  telemetry       Hospital COURSE:    56 year old female with a pmhx of chronic hypoxic respiratory failure (on 4L home O2) secondary to ( HFrEF 35-40%, GIII DD)  with pulmonary hypertension, HTN, HLD, DM2, active smoker , CVA with residual LE weakness (2014, wheelchair bound),  COPD , mood /depression,  presents of worsening shortness of breath and increase weight with  B/L LE over past week.   Associated with increase oxygen use, mild SOB at rest orthopnea, and palpitations. She endorses compliance with home torsemide, as well as fluid restriction and low salt diet. She denies CP.     In ED VITALS: Temp 98.6F, , / 112, RR 18 Spo2 98 on 3L. Her O2 was increased to 5L due to feeling uncomfortable. CXR: increased bilateral opacities. BNP 7542, Troponin 0.02. Given lasix IV in ED. Admitted fort HFrEF exacerbation.       admitted to floor with acute on chronic HF     Floor course     on 4l NC with good saturation   currently being treated with IV lasix 40mg BID     seen by heart failure team   recommend transfer to telemetry and start the following once transferred -   - bumex IV 2mg push once   - bumex infusion at 1mg/hr   - hypertonic saline 150ml BID, (over 1 hour if through central line, over 3 hours if through peripheral)   - obtain BMP q12   - strict I/os   - daily weights     patient had chest pain,   - EKG ordered - no ST changes,  - troponin ordered - fu pls    - repeat cxr - largely unchanged (pls fu offical read)  - dimer ordered - 797   - CT PE ordered      ASSESSMENT & PLAN:     56 year old female with a pmhx of chronic hypoxic respiratory failure (on 4L home O2) secondary to ( HFrEF 35-40%, GIII DD)  with pulmonary hypertension, HTN, HLD, DM2, active smoker , CVA with residual LE weakness (2014, wheelchair bound),  COPD , mood /depression,  presents of worsening shortness of breath and increase weight with  B/L LE over past week.     #Acute Hypoxic Respiratory Failure  #Acute on Chronic HFrEF exacerbation  #Hx of Pulmonary Hypertension   - Titrate oxygen to 4L, keep SpO2 > 92%  - ECHO  - c/w entresto, toprol, statun   - accurate I + O  - fluid restriction to 1L, low salt diet   - currently lasix 40mg bid   - Heart Failure consult   to start once on tele --   - bumex iv 2mg push once   - bumex infusion 1mg/hr   - hypertonic saline 150ml/3 hours if peripheral line BID   - monitor BMP q12 hr   - K> 4, mag > 2.1     #CAD s/p PCI (6/2021)  - c/w aspirin and plavix     #Atrial Tachycardia  - seen by EP during admission in 6/21  - was started on amiodarone, has not picked up from pharmacy since 6/21  - will hold amiodarone   - c/w toprol     #HLD  - c/w Fenofibrate and Lipitor     #Hypertension  - uncontrolled   - c/w toprol, entresto     #Mood disorder  - c/w seroquil 100mg QHS  - no longer taking depakote     #COPD  - c/w Symbicort     #DM type 2  - holding Metformin and Glipizide   - start basal bolus if blood glucose > 180     #Vitamin D Deficiency   - c/w vit D 50,000 Q weekly     #Hx of CVA with residual LE weakness   - uses wheelchair to ambulate   - c/w aspirin and plavix     #DVT PPX: Lovenox   #GI PPX: Protonix   #activity as tolerated  #Diet: DASH   #dispo: acute             For Follow-Up:  - please place orders recommended by HF once transferred,( cannot be started on the floor)  - bmp at 8pm   - echo  - troponin   - CT angio chest r/o PE   - duplex Left Upper extremity - for hand swelling   - fu with HF team     Vital Signs Last 24 Hrs  T(C): 36.1 (09 Nov 2021 07:59), Max: 36.1 (09 Nov 2021 07:59)  T(F): 97 (09 Nov 2021 07:59), Max: 97 (09 Nov 2021 07:59)  HR: 88 (09 Nov 2021 07:59) (88 - 89)  BP: 150/80 (09 Nov 2021 09:30) (140/91 - 172/112)  BP(mean): --  RR: 18 (09 Nov 2021 09:30) (18 - 18)  SpO2: 100% (09 Nov 2021 09:30) (98% - 100%)  I&O's Summary    09 Nov 2021 07:01  -  09 Nov 2021 12:37  --------------------------------------------------------  IN: 700 mL / OUT: 800 mL / NET: -100 mL          MEDICATIONS  (STANDING):  aspirin  chewable 81 milliGRAM(s) Oral daily  atorvastatin 80 milliGRAM(s) Oral at bedtime  budesonide 160 MICROgram(s)/formoterol 4.5 MICROgram(s) Inhaler 2 Puff(s) Inhalation two times a day  buMETAnide Infusion 1 mG/Hr (5 mL/Hr) IV Continuous <Continuous>  buMETAnide Injectable 2 milliGRAM(s) IV Push once  chlorhexidine 4% Liquid 1 Application(s) Topical <User Schedule>  clopidogrel Tablet 75 milliGRAM(s) Oral daily  enoxaparin Injectable 40 milliGRAM(s) SubCutaneous daily  fenofibrate Tablet 145 milliGRAM(s) Oral daily  magnesium sulfate  IVPB 2 Gram(s) IV Intermittent once  metoprolol succinate ER 50 milliGRAM(s) Oral daily  omega-3-Acid Ethyl Esters 2 Gram(s) Oral two times a day  pantoprazole    Tablet 40 milliGRAM(s) Oral before breakfast  sacubitril 49 mG/valsartan 51 mG 1 Tablet(s) Oral two times a day  sodium chloride 3%. 500 milliLiter(s) (150 mL/Hr) IV Continuous <Continuous>  sodium chloride 3%. 500 milliLiter(s) (150 mL/Hr) IV Continuous <Continuous>    MEDICATIONS  (PRN):  ALBUTerol    90 MICROgram(s) HFA Inhaler 2 Puff(s) Inhalation every 6 hours PRN Shortness of Breath and/or Wheezing        LABS                                            14.0                  Neurophils% (auto):   x      (11-09 @ 06:19):    7.49 )-----------(243          Lymphocytes% (auto):  x                                             44.4                   Eosinphils% (auto):   x        Manual%: Neutrophils x    ; Lymphocytes x    ; Eosinophils x    ; Bands%: x    ; Blasts x                                    138    |  98     |  18                  Calcium: 9.1   / iCa: x      (11-09 @ 06:19)    ----------------------------<  134       Magnesium: 1.5                              3.8     |  22     |  1.0              Phosphorous: x        TPro  6.3    /  Alb  3.1    /  TBili  1.3    /  DBili  x      /  AST  17     /  ALT  9      /  AlkPhos  86     09 Nov 2021 06:19    ( 11-09 @ 06:19 )   PT: 13.60 sec;   INR: 1.18 ratio  aPTT: 30.1 sec

## 2021-11-09 NOTE — PROGRESS NOTE ADULT - ATTENDING COMMENTS
****My note supersedes any discrepancies that may be above in the resident's note***    56 year old female with a pmhx of chronic hypoxic respiratory failure (on 4L home O2) secondary to ( HFrEF 35-40%, GIII DD)  with pulmonary hypertension, HTN, HLD, DM2, active smoker , CVA with residual LE weakness (2014, wheelchair bound),  COPD , mood /depression,  presents of worsening dyspnea, orthopnea, weight gain concerning for acute hypoxic respiratory failure 2/2 to acute CHF exacerbation.     #Acute Hypoxic Respiratory Failure  #Acute on Chronic HFrEF exacerbation  #Hx of Pulmonary Hypertension   - currently HDS and afebrile  - Titrate oxygen to 4L, keep SpO2 > 92%  - ECHO-pending; last TTE on 6/2021 had EF of 35-40%  - baseline weight approx 200lbs, currently 225lbs  - patient adamant that she was compliant with diet and medications  - s/p lasix 40mg IV x1 in ED and started on BID on the floor  - HF consult placed with recs to transfer to  with tele for bumex gtt and hypertonic saline infusion  - c/w entresto, toprol, statun   - strict I/Os  - daily weights  - fluid restriction to 1L, low salt diet   - monitor BMP q12 hr   - K> 4, mag > 2.1     # Left upper ext swelling  - notes that has been going on for 1 week duration  - unsure etiology  - dupplex pending    #tachycardic  - unsure etiology  - EKG revealed sinus tach  - obtaining tropes -pending  - d-dimer ~800; age adjusted 540. suggestive of possible VTE  - Modified wells 6pts(clinical signs/sx of dvt, HR, immobilization/sendetary)-->moderate risk  - renal function is WNL  - will obtain CT angio to rule out PE.  - dupplex still ordered for LUE swelling      #CAD s/p PCI (6/2021)  - c/w aspirin and plavix     #Atrial Tachycardia  - seen by EP during admission in 6/21  - was started on amiodarone, has not picked up from pharmacy since 6/21  - will hold amiodarone   - c/w toprol     #HLD  - c/w Fenofibrate and Lipitor     #Hypertension  - uncontrolled   - c/w toprol, entresto     #Mood disorder  - c/w seroquil 100mg QHS  - no longer taking depakote     #COPD  - c/w Symbicort     #DM type 2  - holding Metformin and Glipizide   - start basal bolus if blood glucose > 180     #Vitamin D Deficiency   - c/w vit D 50,000 Q weekly     #Hx of CVA with residual LE weakness   - uses wheelchair to ambulate   - c/w aspirin and plavix     #DVT PPX: Lovenox   #GI PPX: Protonix   #activity as tolerated  #Diet: DASH   #dispo: transfer to [facility] ****My note supersedes any discrepancies that may be above in the resident's note***    56 year old female with a pmhx of chronic hypoxic respiratory failure (on 4L home O2) secondary to ( HFrEF 35-40%, GIII DD)  with pulmonary hypertension, HTN, HLD, DM2, active smoker , CVA with residual LE weakness (2014, wheelchair bound),  COPD , mood /depression,  presents of worsening dyspnea, orthopnea, weight gain concerning for acute hypoxic respiratory failure 2/2 to acute CHF exacerbation.     #Acute Hypoxic Respiratory Failure  #Acute on Chronic HFrEF exacerbation  #Hx of Pulmonary Hypertension   - currently HDS and afebrile  - Titrate oxygen to 4L, keep SpO2 > 92%  - ECHO-pending; last TTE on 6/2021 had EF of 35-40%  - baseline weight approx 200lbs, currently 225lbs  - patient adamant that she was compliant with diet and medications  - s/p lasix 40mg IV x1 in ED and started on BID on the floor  - HF consult placed with recs to transfer to  with tele for bumex gtt and hypertonic saline infusion  - c/w entresto, toprol, statun   - strict I/Os  - daily weights  - fluid restriction to 1L, low salt diet   - monitor BMP q12 hr   - K> 4, mag > 2.1     #tachycardic  - unsure etiology  - EKG revealed sinus tach  - obtaining tropes -pending  - d-dimer ~800; age adjusted 540. suggestive of possible VTE  - Modified wells 6pts(clinical signs/sx of dvt, HR, immobilization/sendetary)-->moderate risk  - renal function is WNL  - will obtain CT angio to rule out PE.  - dupplex still ordered for LUE swelling  - patient has been on lovenox ppx since admission      #CAD s/p PCI (6/2021)  - c/w aspirin and plavix     #Atrial Tachycardia  - seen by EP during admission in 6/21  - was started on amiodarone, has not picked up from pharmacy since 6/21  - will hold amiodarone   - c/w toprol     #HLD  - c/w Fenofibrate and Lipitor     #Hypertension  - uncontrolled   - c/w toprol, entresto     #Mood disorder  - c/w seroquil 100mg QHS  - no longer taking depakote     #COPD  - c/w Symbicort     #DM type 2  - holding Metformin and Glipizide   - start basal bolus if blood glucose > 180     #Vitamin D Deficiency   - c/w vit D 50,000 Q weekly     #Hx of CVA with residual LE weakness   - uses wheelchair to ambulate   - c/w aspirin and plavix     #DVT PPX: Lovenox   #GI PPX: Protonix   #activity as tolerated  #Diet: DASH   #dispo: transfer to tele

## 2021-11-09 NOTE — PATIENT PROFILE ADULT - LIVING ENVIRONMENT
----- Message from TROY Junior sent at 6/11/2018  9:17 AM CDT -----  Patient's diabetes is poorly controlled. HgA1C has only slightly improved and shows average blood sugars to be running in the 180s. Cholesterol panel has worsened. The good cholesterol has dropped and the triglycerides have increased. This is most likely related to the poor diabetic control. Add glipizide to regimen. Also, recommend starting a statin, atorvastin 10 mg. Repeat HgA1C, CMP, and lipid panel in 3 months.   no

## 2021-11-09 NOTE — CONSULT NOTE ADULT - ASSESSMENT
Acute on chronic HF     Transfer to Tele    Patient is massively fluid overloaded on exam  Give Bumex 2 mg IV push, then start a Bumex gtt at 1 mg/hr   Start 3% Hypertonic Saline 150ml BID(If infused throughout a central line, run over one hour, if a peripheral line is to be used run over 3 hours)     Get BMP twice daily   Maintain potassium >4.0, Mg >2.1  Strict intake and output  Daily weight   plan discussed with primary team  Will continue to follow    Acute on chronic systolic HF /Fluid overload /pulmonary HTN    Transfer to Tele  Patient is massively fluid overloaded on exam  Give Bumex 2 mg IV push, then start a Bumex gtt at 1 mg/hr   Start 3% Hypertonic Saline 150ml BID  Get BMP twice daily   Maintain potassium >4.0, Mg >2.1  Strict intake and output  Daily weight   Plan discussed with primary team  Will continue to follow

## 2021-11-09 NOTE — PROCEDURE NOTE - NSSITEPREP_SKIN_A_CORE
chlorhexidine No. ANDREA screening performed.  STOP BANG Legend: 0-2 = LOW Risk; 3-4 = INTERMEDIATE Risk; 5-8 = HIGH Risk neck = 16 inches/No. ANDREA screening performed.  STOP BANG Legend: 0-2 = LOW Risk; 3-4 = INTERMEDIATE Risk; 5-8 = HIGH Risk

## 2021-11-09 NOTE — PROVIDER CONTACT NOTE (OTHER) - ACTION/TREATMENT ORDERED:
Provider notified, no intervention at this time
Provider notified, no intervention at this time
trops drawn, d-dimer drawn, 12lead and cxr completed.

## 2021-11-09 NOTE — PROGRESS NOTE ADULT - SUBJECTIVE AND OBJECTIVE BOX
WILLIAN MARY 56y Female  MRN#: [id number]   CODE STATUS: full code     Hospital Day: 1d    Pt is currently admitted with the primary diagnosis of CHF     SUBJECTIVE    No acute events overnight, denies fever chills, nvd, abdominal pain, no chest pain, no palpitations                                               ----------------------------------------------------------  OBJECTIVE  PAST MEDICAL & SURGICAL HISTORY  Hypertension    Hyperlipidemia    Anxiety and depression    COPD, severe    CHF (congestive heart failure)    Cerebrovascular accident (CVA)  Multiple    Type 2 diabetes mellitus    CKD (chronic kidney disease), stage II    No significant past surgical history                                              -----------------------------------------------------------  ALLERGIES:  No Known Allergies                                            ------------------------------------------------------------    HOME MEDICATIONS  Home Medications:  Albuterol (Eqv-ProAir HFA) 90 mcg/inh inhalation aerosol: 2 puff(s) inhaled every 6 hours, As Needed (07 Jun 2021 11:22)  aspirin 81 mg oral tablet: 1 tab(s) orally once a day (07 Jun 2021 11:22)  fenofibrate 145 mg oral tablet: 1 tab(s) orally once a day (07 Jun 2021 11:22)  glipiZIDE 10 mg oral tablet: 1 tab(s) orally 2 times a day (07 Jun 2021 11:22)  Lovaza 1000 mg oral capsule: 2 cap(s) orally 2 times a day (08 Nov 2021 14:52)  metFORMIN 1000 mg oral tablet: 1 tab(s) orally 2 times a day Do not take until 6/23 (22 Jun 2021 12:58)  Plavix 75 mg oral tablet: 1 tab(s) orally once a day (07 Jun 2021 11:22)  Vitamin D2 1.25 mg (50,000 intl units) oral capsule: 1 cap(s) orally once a week (08 Nov 2021 14:52)                           MEDICATIONS:  STANDING MEDICATIONS  aspirin  chewable 81 milliGRAM(s) Oral daily  atorvastatin 80 milliGRAM(s) Oral at bedtime  budesonide 160 MICROgram(s)/formoterol 4.5 MICROgram(s) Inhaler 2 Puff(s) Inhalation two times a day  chlorhexidine 4% Liquid 1 Application(s) Topical <User Schedule>  clopidogrel Tablet 75 milliGRAM(s) Oral daily  enoxaparin Injectable 40 milliGRAM(s) SubCutaneous daily  fenofibrate Tablet 145 milliGRAM(s) Oral daily  furosemide   Injectable 40 milliGRAM(s) IV Push two times a day  metoprolol succinate ER 50 milliGRAM(s) Oral daily  omega-3-Acid Ethyl Esters 2 Gram(s) Oral two times a day  pantoprazole    Tablet 40 milliGRAM(s) Oral before breakfast  sacubitril 49 mG/valsartan 51 mG 1 Tablet(s) Oral two times a day    PRN MEDICATIONS  ALBUTerol    90 MICROgram(s) HFA Inhaler 2 Puff(s) Inhalation every 6 hours PRN                                            ------------------------------------------------------------  VITAL SIGNS: Last 24 Hours  T(C): 36.1 (09 Nov 2021 07:59), Max: 36.1 (09 Nov 2021 07:59)  T(F): 97 (09 Nov 2021 07:59), Max: 97 (09 Nov 2021 07:59)  HR: 88 (09 Nov 2021 07:59) (88 - 89)  BP: 150/80 (09 Nov 2021 09:30) (140/91 - 172/112)  BP(mean): --  RR: 18 (09 Nov 2021 09:30) (18 - 18)  SpO2: 100% (09 Nov 2021 09:30) (98% - 100%)      11-09-21 @ 07:01  -  11-09-21 @ 13:05  --------------------------------------------------------  IN: 700 mL / OUT: 800 mL / NET: -100 mL                                             --------------------------------------------------------------  LABS:                        14.0   7.49  )-----------( 243      ( 09 Nov 2021 06:19 )             44.4     11-09    138  |  98  |  18  ----------------------------<  134<H>  3.8   |  22  |  1.0    Ca    9.1      09 Nov 2021 06:19  Mg     1.5     11-09    TPro  6.3  /  Alb  3.1<L>  /  TBili  1.3<H>  /  DBili  x   /  AST  17  /  ALT  9   /  AlkPhos  86  11-09    PT/INR - ( 09 Nov 2021 06:19 )   PT: 13.60 sec;   INR: 1.18 ratio         PTT - ( 09 Nov 2021 06:19 )  PTT:30.1 sec              CARDIAC MARKERS ( 08 Nov 2021 11:27 )  x     / 0.02 ng/mL / x     / x     / x                                                  -------------------------------------------------------------  RADIOLOGY:      ******PRELIMINARY REPORT******    ******PRELIMINARY REPORT******          EXAM:  DUPLEX SCAN EXT VEINS LOWER BI            PROCEDURE DATE:  11/08/2021          ******PRELIMINARY REPORT******    ******PRELIMINARY REPORT******          INTERPRETATION:  CLINICAL INFORMATION: The patient is a 56-year-old female with leg swelling. A venous duplex examination was performed to evaluate the patient for deep venous thrombosis of the lower extremities.    The common femoral, great saphenous, femoral, popliteal and small saphenous veins were visualized bilaterally with no evidence of deep venous thrombosis    All veins were fully compressible.  There was presence of spontaneous flow, augmentation with distal compression and phasicity.    The anterior tibial veins were  patent    The posterior tibial veins were  patent    The peroneal veins were patent.    Impression:    No evidence of deep venous thrombosis or superficial thrombophlebitis in the bilateral lower extremities.    ICD-10:M79.89          ******PRELIMINARY REPORT******    ******PRELIMINARY REPORT******          LORENZO MURPHY MD; Resident Radiologist        EXAM:  XR CHEST PORTABLE IMMED 1V            PROCEDURE DATE:  11/08/2021            INTERPRETATION:  Clinical History / Reason for exam: Shortness of breath.    Comparison : Chest radiograph dated June 14, 2021.    Technique/Positioning: Portable frontal.    Findings:    Support devices: None.    Cardiac/mediastinum/hilum: Stable.    Lung parenchyma/Pleura: Increased bilateral opacities. No pneumothorax.    Skeleton/soft tissues: Stable.    Impression:    1. Increased bilateral opacities.    --- End of Report ---              GIBSON MOURA MD; Attending Radiologist  This document has been electronically signed. Nov 8 2021 12:23PM                                                --------------------------------------------------------------    PHYSICAL EXAM:  GENERAL:  ill appearing,    HEENT: NCAT  LUNGS: air entry bilaterally, decreased at lower bases, on 4l nc   HEART: RRR, +S1,S2, RRR  ABDOMEN: distended non tender abdomen   EXT: Warm, well perfused x 4, edema bilaterally LE   NEURO: AxOx3, No FND's noted  SKIN: No new breakdown or rashes noted

## 2021-11-09 NOTE — PROGRESS NOTE ADULT - ASSESSMENT
56 year old female with a pmhx of chronic hypoxic respiratory failure (on 4L home O2) secondary to ( HFrEF 35-40%, GIII DD)  with pulmonary hypertension, HTN, HLD, DM2, active smoker , CVA with residual LE weakness (2014, wheelchair bound),  COPD , mood /depression,  presents of worsening shortness of breath and increase weight with  B/L LE over past week.     #Acute Hypoxic Respiratory Failure  #Acute on Chronic HFrEF exacerbation  #Hx of Pulmonary Hypertension   - Titrate oxygen to 4L, keep SpO2 > 92%  - ECHO  - c/w entresto, toprol, statun   - accurate I + O  - fluid restriction to 1L, low salt diet   - currently lasix 40mg bid   - Heart Failure consult appreciated   - transfer to tele   to start once on tele --   - bumex iv 2mg push once   - bumex infusion 1mg/hr   - hypertonic saline 150ml/3 hours if peripheral line BID   - monitor BMP q12 hr   - K> 4, mag > 2.1     #CAD s/p PCI (6/2021)  - c/w aspirin and plavix     #Atrial Tachycardia  - seen by EP during admission in 6/21  - was started on amiodarone, has not picked up from pharmacy since 6/21  - will hold amiodarone   - c/w toprol     #HLD  - c/w Fenofibrate and Lipitor     #Hypertension  - uncontrolled   - c/w toprol, entresto     #Mood disorder  - c/w seroquil 100mg QHS  - no longer taking depakote     #COPD  - c/w Symbicort     #DM type 2  - holding Metformin and Glipizide   - start basal bolus if blood glucose > 180     #Vitamin D Deficiency   - c/w vit D 50,000 Q weekly     #Hx of CVA with residual LE weakness   - uses wheelchair to ambulate   - c/w aspirin and plavix     #DVT PPX: Lovenox   #GI PPX: Protonix   #activity as tolerated  #Diet: DASH   #dispo: transfer to tele

## 2021-11-10 LAB
ALBUMIN SERPL ELPH-MCNC: 3.4 G/DL — LOW (ref 3.5–5.2)
ALBUMIN SERPL ELPH-MCNC: 3.5 G/DL — SIGNIFICANT CHANGE UP (ref 3.5–5.2)
ALP SERPL-CCNC: 102 U/L — SIGNIFICANT CHANGE UP (ref 30–115)
ALP SERPL-CCNC: 99 U/L — SIGNIFICANT CHANGE UP (ref 30–115)
ALT FLD-CCNC: 10 U/L — SIGNIFICANT CHANGE UP (ref 0–41)
ALT FLD-CCNC: 12 U/L — SIGNIFICANT CHANGE UP (ref 0–41)
ANION GAP SERPL CALC-SCNC: 18 MMOL/L — HIGH (ref 7–14)
ANION GAP SERPL CALC-SCNC: 19 MMOL/L — HIGH (ref 7–14)
ANION GAP SERPL CALC-SCNC: 20 MMOL/L — HIGH (ref 7–14)
AST SERPL-CCNC: 28 U/L — SIGNIFICANT CHANGE UP (ref 0–41)
AST SERPL-CCNC: 42 U/L — HIGH (ref 0–41)
BASOPHILS # BLD AUTO: 0.06 K/UL — SIGNIFICANT CHANGE UP (ref 0–0.2)
BASOPHILS NFR BLD AUTO: 0.7 % — SIGNIFICANT CHANGE UP (ref 0–1)
BILIRUB SERPL-MCNC: 1.2 MG/DL — SIGNIFICANT CHANGE UP (ref 0.2–1.2)
BILIRUB SERPL-MCNC: 1.3 MG/DL — HIGH (ref 0.2–1.2)
BUN SERPL-MCNC: 15 MG/DL — SIGNIFICANT CHANGE UP (ref 10–20)
BUN SERPL-MCNC: 16 MG/DL — SIGNIFICANT CHANGE UP (ref 10–20)
BUN SERPL-MCNC: 16 MG/DL — SIGNIFICANT CHANGE UP (ref 10–20)
CALCIUM SERPL-MCNC: 8.5 MG/DL — SIGNIFICANT CHANGE UP (ref 8.5–10.1)
CALCIUM SERPL-MCNC: 8.6 MG/DL — SIGNIFICANT CHANGE UP (ref 8.5–10.1)
CALCIUM SERPL-MCNC: 8.8 MG/DL — SIGNIFICANT CHANGE UP (ref 8.5–10.1)
CHLORIDE SERPL-SCNC: 93 MMOL/L — LOW (ref 98–110)
CHLORIDE SERPL-SCNC: 94 MMOL/L — LOW (ref 98–110)
CHLORIDE SERPL-SCNC: 95 MMOL/L — LOW (ref 98–110)
CHOLEST SERPL-MCNC: 89 MG/DL — SIGNIFICANT CHANGE UP
CK MB CFR SERPL CALC: 24.1 NG/ML — HIGH (ref 0.6–6.3)
CK SERPL-CCNC: 230 U/L — HIGH (ref 0–225)
CO2 SERPL-SCNC: 21 MMOL/L — SIGNIFICANT CHANGE UP (ref 17–32)
CO2 SERPL-SCNC: 21 MMOL/L — SIGNIFICANT CHANGE UP (ref 17–32)
CO2 SERPL-SCNC: 24 MMOL/L — SIGNIFICANT CHANGE UP (ref 17–32)
CREAT SERPL-MCNC: 0.9 MG/DL — SIGNIFICANT CHANGE UP (ref 0.7–1.5)
CREAT SERPL-MCNC: 0.9 MG/DL — SIGNIFICANT CHANGE UP (ref 0.7–1.5)
CREAT SERPL-MCNC: 1 MG/DL — SIGNIFICANT CHANGE UP (ref 0.7–1.5)
D DIMER BLD IA.RAPID-MCNC: 834 NG/ML DDU — HIGH (ref 0–230)
EOSINOPHIL # BLD AUTO: 0.09 K/UL — SIGNIFICANT CHANGE UP (ref 0–0.7)
EOSINOPHIL NFR BLD AUTO: 1 % — SIGNIFICANT CHANGE UP (ref 0–8)
GLUCOSE BLDC GLUCOMTR-MCNC: 159 MG/DL — HIGH (ref 70–99)
GLUCOSE BLDC GLUCOMTR-MCNC: 173 MG/DL — HIGH (ref 70–99)
GLUCOSE SERPL-MCNC: 176 MG/DL — HIGH (ref 70–99)
GLUCOSE SERPL-MCNC: 191 MG/DL — HIGH (ref 70–99)
GLUCOSE SERPL-MCNC: 214 MG/DL — HIGH (ref 70–99)
HCT VFR BLD CALC: 47 % — SIGNIFICANT CHANGE UP (ref 37–47)
HDLC SERPL-MCNC: 24 MG/DL — LOW
HGB BLD-MCNC: 15.1 G/DL — SIGNIFICANT CHANGE UP (ref 12–16)
IMM GRANULOCYTES NFR BLD AUTO: 0.8 % — HIGH (ref 0.1–0.3)
LIPID PNL WITH DIRECT LDL SERPL: 40 MG/DL — SIGNIFICANT CHANGE UP
LYMPHOCYTES # BLD AUTO: 1.1 K/UL — LOW (ref 1.2–3.4)
LYMPHOCYTES # BLD AUTO: 12.5 % — LOW (ref 20.5–51.1)
MAGNESIUM SERPL-MCNC: 1.3 MG/DL — LOW (ref 1.8–2.4)
MCHC RBC-ENTMCNC: 29.1 PG — SIGNIFICANT CHANGE UP (ref 27–31)
MCHC RBC-ENTMCNC: 32.1 G/DL — SIGNIFICANT CHANGE UP (ref 32–37)
MCV RBC AUTO: 90.6 FL — SIGNIFICANT CHANGE UP (ref 81–99)
MONOCYTES # BLD AUTO: 0.43 K/UL — SIGNIFICANT CHANGE UP (ref 0.1–0.6)
MONOCYTES NFR BLD AUTO: 4.9 % — SIGNIFICANT CHANGE UP (ref 1.7–9.3)
NEUTROPHILS # BLD AUTO: 7.03 K/UL — HIGH (ref 1.4–6.5)
NEUTROPHILS NFR BLD AUTO: 80.1 % — HIGH (ref 42.2–75.2)
NON HDL CHOLESTEROL: 65 MG/DL — SIGNIFICANT CHANGE UP
NRBC # BLD: 0 /100 WBCS — SIGNIFICANT CHANGE UP (ref 0–0)
PLATELET # BLD AUTO: 292 K/UL — SIGNIFICANT CHANGE UP (ref 130–400)
POTASSIUM SERPL-MCNC: 3.7 MMOL/L — SIGNIFICANT CHANGE UP (ref 3.5–5)
POTASSIUM SERPL-MCNC: 3.8 MMOL/L — SIGNIFICANT CHANGE UP (ref 3.5–5)
POTASSIUM SERPL-MCNC: 4.1 MMOL/L — SIGNIFICANT CHANGE UP (ref 3.5–5)
POTASSIUM SERPL-SCNC: 3.7 MMOL/L — SIGNIFICANT CHANGE UP (ref 3.5–5)
POTASSIUM SERPL-SCNC: 3.8 MMOL/L — SIGNIFICANT CHANGE UP (ref 3.5–5)
POTASSIUM SERPL-SCNC: 4.1 MMOL/L — SIGNIFICANT CHANGE UP (ref 3.5–5)
PROT SERPL-MCNC: 6.8 G/DL — SIGNIFICANT CHANGE UP (ref 6–8)
PROT SERPL-MCNC: 7 G/DL — SIGNIFICANT CHANGE UP (ref 6–8)
RBC # BLD: 5.19 M/UL — SIGNIFICANT CHANGE UP (ref 4.2–5.4)
RBC # FLD: 16.5 % — HIGH (ref 11.5–14.5)
SODIUM SERPL-SCNC: 135 MMOL/L — SIGNIFICANT CHANGE UP (ref 135–146)
TRIGL SERPL-MCNC: 174 MG/DL — HIGH
TROPONIN T SERPL-MCNC: 0.06 NG/ML — CRITICAL HIGH
TROPONIN T SERPL-MCNC: 0.28 NG/ML — CRITICAL HIGH
WBC # BLD: 8.78 K/UL — SIGNIFICANT CHANGE UP (ref 4.8–10.8)
WBC # FLD AUTO: 8.78 K/UL — SIGNIFICANT CHANGE UP (ref 4.8–10.8)

## 2021-11-10 PROCEDURE — 93010 ELECTROCARDIOGRAM REPORT: CPT

## 2021-11-10 PROCEDURE — 99222 1ST HOSP IP/OBS MODERATE 55: CPT

## 2021-11-10 PROCEDURE — 99233 SBSQ HOSP IP/OBS HIGH 50: CPT

## 2021-11-10 PROCEDURE — 93971 EXTREMITY STUDY: CPT | Mod: 26,LT

## 2021-11-10 PROCEDURE — 71275 CT ANGIOGRAPHY CHEST: CPT | Mod: 26

## 2021-11-10 PROCEDURE — 93010 ELECTROCARDIOGRAM REPORT: CPT | Mod: 77,76

## 2021-11-10 RX ORDER — CLOPIDOGREL BISULFATE 75 MG/1
300 TABLET, FILM COATED ORAL ONCE
Refills: 0 | Status: COMPLETED | OUTPATIENT
Start: 2021-11-10 | End: 2021-11-10

## 2021-11-10 RX ORDER — MAGNESIUM SULFATE 500 MG/ML
2 VIAL (ML) INJECTION
Refills: 0 | Status: COMPLETED | OUTPATIENT
Start: 2021-11-10 | End: 2021-11-10

## 2021-11-10 RX ORDER — POTASSIUM CHLORIDE 20 MEQ
40 PACKET (EA) ORAL ONCE
Refills: 0 | Status: COMPLETED | OUTPATIENT
Start: 2021-11-10 | End: 2021-11-10

## 2021-11-10 RX ORDER — SODIUM CHLORIDE 5 G/100ML
500 INJECTION, SOLUTION INTRAVENOUS
Refills: 0 | Status: DISCONTINUED | OUTPATIENT
Start: 2021-11-11 | End: 2021-11-13

## 2021-11-10 RX ORDER — SODIUM CHLORIDE 5 G/100ML
500 INJECTION, SOLUTION INTRAVENOUS
Refills: 0 | Status: DISCONTINUED | OUTPATIENT
Start: 2021-11-10 | End: 2021-11-13

## 2021-11-10 RX ORDER — CLOPIDOGREL BISULFATE 75 MG/1
75 TABLET, FILM COATED ORAL DAILY
Refills: 0 | Status: DISCONTINUED | OUTPATIENT
Start: 2021-11-11 | End: 2021-11-18

## 2021-11-10 RX ORDER — ENOXAPARIN SODIUM 100 MG/ML
40 INJECTION SUBCUTANEOUS
Refills: 0 | Status: DISCONTINUED | OUTPATIENT
Start: 2021-11-10 | End: 2021-11-10

## 2021-11-10 RX ORDER — MAGNESIUM OXIDE 400 MG ORAL TABLET 241.3 MG
400 TABLET ORAL
Refills: 0 | Status: DISCONTINUED | OUTPATIENT
Start: 2021-11-10 | End: 2021-11-18

## 2021-11-10 RX ORDER — MAGNESIUM SULFATE 500 MG/ML
2 VIAL (ML) INJECTION ONCE
Refills: 0 | Status: COMPLETED | OUTPATIENT
Start: 2021-11-10 | End: 2021-11-10

## 2021-11-10 RX ORDER — ASPIRIN/CALCIUM CARB/MAGNESIUM 324 MG
325 TABLET ORAL ONCE
Refills: 0 | Status: COMPLETED | OUTPATIENT
Start: 2021-11-10 | End: 2021-11-10

## 2021-11-10 RX ORDER — POTASSIUM CHLORIDE 20 MEQ
40 PACKET (EA) ORAL ONCE
Refills: 0 | Status: COMPLETED | OUTPATIENT
Start: 2021-11-10 | End: 2021-11-11

## 2021-11-10 RX ORDER — ASPIRIN/CALCIUM CARB/MAGNESIUM 324 MG
81 TABLET ORAL DAILY
Refills: 0 | Status: DISCONTINUED | OUTPATIENT
Start: 2021-11-11 | End: 2021-11-18

## 2021-11-10 RX ADMIN — SACUBITRIL AND VALSARTAN 1 TABLET(S): 24; 26 TABLET, FILM COATED ORAL at 17:52

## 2021-11-10 RX ADMIN — MAGNESIUM OXIDE 400 MG ORAL TABLET 400 MILLIGRAM(S): 241.3 TABLET ORAL at 17:52

## 2021-11-10 RX ADMIN — Medication 2 GRAM(S): at 06:28

## 2021-11-10 RX ADMIN — Medication 40 MILLIEQUIVALENT(S): at 13:44

## 2021-11-10 RX ADMIN — Medication 16.67 GRAM(S): at 13:44

## 2021-11-10 RX ADMIN — Medication 81 MILLIGRAM(S): at 12:10

## 2021-11-10 RX ADMIN — MAGNESIUM OXIDE 400 MG ORAL TABLET 400 MILLIGRAM(S): 241.3 TABLET ORAL at 13:44

## 2021-11-10 RX ADMIN — PANTOPRAZOLE SODIUM 40 MILLIGRAM(S): 20 TABLET, DELAYED RELEASE ORAL at 06:27

## 2021-11-10 RX ADMIN — BUMETANIDE 5 MG/HR: 0.25 INJECTION INTRAMUSCULAR; INTRAVENOUS at 17:50

## 2021-11-10 RX ADMIN — Medication 145 MILLIGRAM(S): at 12:10

## 2021-11-10 RX ADMIN — ENOXAPARIN SODIUM 40 MILLIGRAM(S): 100 INJECTION SUBCUTANEOUS at 17:52

## 2021-11-10 RX ADMIN — MAGNESIUM OXIDE 400 MG ORAL TABLET 400 MILLIGRAM(S): 241.3 TABLET ORAL at 07:57

## 2021-11-10 RX ADMIN — SODIUM CHLORIDE 150 MILLILITER(S): 5 INJECTION, SOLUTION INTRAVENOUS at 19:11

## 2021-11-10 RX ADMIN — Medication 25 GRAM(S): at 17:50

## 2021-11-10 RX ADMIN — Medication 325 MILLIGRAM(S): at 23:32

## 2021-11-10 RX ADMIN — CLOPIDOGREL BISULFATE 300 MILLIGRAM(S): 75 TABLET, FILM COATED ORAL at 23:31

## 2021-11-10 RX ADMIN — CHLORHEXIDINE GLUCONATE 1 APPLICATION(S): 213 SOLUTION TOPICAL at 06:28

## 2021-11-10 RX ADMIN — CLOPIDOGREL BISULFATE 75 MILLIGRAM(S): 75 TABLET, FILM COATED ORAL at 12:10

## 2021-11-10 RX ADMIN — Medication 40 MILLIGRAM(S): at 06:27

## 2021-11-10 RX ADMIN — Medication 2 GRAM(S): at 17:52

## 2021-11-10 RX ADMIN — SODIUM CHLORIDE 50 MILLILITER(S): 5 INJECTION, SOLUTION INTRAVENOUS at 17:51

## 2021-11-10 RX ADMIN — Medication 50 MILLIGRAM(S): at 06:27

## 2021-11-10 RX ADMIN — SACUBITRIL AND VALSARTAN 1 TABLET(S): 24; 26 TABLET, FILM COATED ORAL at 06:27

## 2021-11-10 RX ADMIN — BUDESONIDE AND FORMOTEROL FUMARATE DIHYDRATE 2 PUFF(S): 160; 4.5 AEROSOL RESPIRATORY (INHALATION) at 07:58

## 2021-11-10 RX ADMIN — ATORVASTATIN CALCIUM 80 MILLIGRAM(S): 80 TABLET, FILM COATED ORAL at 22:05

## 2021-11-10 NOTE — PROGRESS NOTE ADULT - SUBJECTIVE AND OBJECTIVE BOX
Date of Admission: 21    Interval History:    - Patient resting in bed, reports feeling less SOB today  - Remains massively fluid overloaded, responding well to IV diuretics    HISTORY OF PRESENT ILLNESS:     56 year old female with a pmhx of chronic hypoxic respiratory failure (on 4L home O2) secondary to ( HFrEF 35-40%, GIII DD)  with pulmonary hypertension, HTN, HLD, DM2, active smoker , CVA with residual LE weakness (2014, wheelchair bound),  COPD , mood /depression,  presents of worsening shortness of breath and increase weight with  B/L LE over past week. Associated with increase oxygen use, mild SOB at rest orthopnea, and palpitations. She endorses compliance with home torsemide, as well as fluid restriction and low salt diet. She denies CP.     Patient resting in bed, c/o generalized pain and abdominal discomfort, reports for couple of days she had worsening SOB, and was feeling bloated. Patient is not very active however she was SOB with minimal exertion. Patient denies c/p, palpitations, headaches, bleeding, LH, dizziness, orthopnea, PND, LOC, cough. Endorses compliance with medications and follows a low sodium diet       PAST MEDICAL & SURGICAL HISTORY:      Hypertension  Hyperlipidemia  Anxiety and depression  COPD, severe  CHF (congestive heart failure)  Cerebrovascular accident (CVA)Multiple  Type 2 diabetes mellitus  CKD (chronic kidney disease), stage II  No significant past surgical history        FAMILY HISTORY:  No pertinent family history of premature cardiovascular disease in first degree relatives.  Mother:  of cancer at 65  Father:  of an overdose at 50    SOCIAL HISTORY:      Current every day smoker, 2 cigarettes a day, denies alcohol or drug use    Allergies    No Known Allergies    Intolerances      PHYSICAL EXAM:  General Appearance: well appearing, normal for age and gender. 	  Neck: unable to assess due to body habitus JVP, no bruit.   Cardiovascular: regular rate and rhythm S1 S2, , No murmurs, Generalized edema  Respiratory: B/L expiratory wheezing  Psychiatry: Alert and oriented x 3, Mood & affect appropriate  Gastrointestinal:  Soft, Non-tender  Skin/Integumentary : No rashes, No ecchymoses, No cyanosis	  Neurologic: Non-focal  Musculoskeletal/ extremities: Normal range of motion, No clubbing, cyanosis   Vascular: Peripheral pulses palpable 2+ bilaterally    CARDIAC MARKERS:    Serum Pro-Brain Natriuretic Peptide: 7542 pg/mL (21 @ 11:27)      TELEMETRY EVENTS: 	    EC/8/21    Ventricular Rate 72 BPM  Atrial Rate 72 BPM  P-R Interval 192 ms  QRS Duration 162 ms  Q-T Interval 498 ms  QTC Calculation(Bazett) 545 ms  P Axis 35 degrees  R Axis -88 degrees  T Axis 82 degrees  Diagnosis Line Normal sinus rhythm  Possible Left atrial enlargement  Left axis deviation  Non-specific intra-ventricular conduction block  Abnormal ECG  Confirmed by CARLOTA GAUTHIER MD ([id number]) on 2021 8:55:01 AM      PREVIOUS DIAGNOSTIC TESTING:      TTE 21    Summary:   1. Left ventricular ejection fraction, by visual estimation, is 35 to 40%.   2.Moderately decreased global left ventricular systolic function.   3. Multiple left ventricular regional wall motion abnormalities exist. See wall motion findings.   4. Elevated left atrial and left ventricular end-diastolic pressures.   5. Mild concentric left ventricular hypertrophy.   6. Mildly increased LV wall thickness.   7. Normal left ventricular internal cavity size.   8. Spectral Doppler shows restrictive pattern of left ventricular myocardial filling (Grade III diastolic dysfunction).  9. Moderately reduced RV systolic function.  10. Mildly enlarged left atrium.  11. Moderately enlarged right atrium.  12. Small pericardial effusion.  13. Mild to moderate mitral valve regurgitation.  14. Moderate-severe tricuspid regurgitation.  15.Mild aortic regurgitation.  16. Sclerotic aortic valve with normal opening.  17. Estimated pulmonary artery systolic pressure is 50.0 mmHg assuming a right atrial pressure of 15 mmHg, which is consistent with moderate pulmonary hypertension.  18. Pulmonary hypertension is present.  19. LA volume Index is 36.7 ml/m² ml/m2.    	    Home Medications:  Albuterol (Eqv-ProAir HFA) 90 mcg/inh inhalation aerosol: 2 puff(s) inhaled every 6 hours, As Needed (2021 11:22)  aspirin 81 mg oral tablet: 1 tab(s) orally once a day (2021 11:22)  fenofibrate 145 mg oral tablet: 1 tab(s) orally once a day (2021 11:22)  glipiZIDE 10 mg oral tablet: 1 tab(s) orally 2 times a day (2021 11:22)  Lovaza 1000 mg oral capsule: 2 cap(s) orally 2 times a day (2021 14:52)  metFORMIN 1000 mg oral tablet: 1 tab(s) orally 2 times a day Do not take until  (2021 12:58)  Plavix 75 mg oral tablet: 1 tab(s) orally once a day (2021 11:22)  Vitamin D2 1.25 mg (50,000 intl units) oral capsule: 1 cap(s) orally once a week (2021 14:52)    MEDICATIONS  (STANDING):  aspirin  chewable 81 milliGRAM(s) Oral daily  atorvastatin 80 milliGRAM(s) Oral at bedtime  budesonide 160 MICROgram(s)/formoterol 4.5 MICROgram(s) Inhaler 2 Puff(s) Inhalation two times a day  chlorhexidine 4% Liquid 1 Application(s) Topical <User Schedule>  clopidogrel Tablet 75 milliGRAM(s) Oral daily  enoxaparin Injectable 40 milliGRAM(s) SubCutaneous daily  fenofibrate Tablet 145 milliGRAM(s) Oral daily  furosemide   Injectable 40 milliGRAM(s) IV Push two times a day  magnesium sulfate  IVPB 2 Gram(s) IV Intermittent once  metoprolol succinate ER 50 milliGRAM(s) Oral daily  omega-3-Acid Ethyl Esters 2 Gram(s) Oral two times a day  pantoprazole    Tablet 40 milliGRAM(s) Oral before breakfast  sacubitril 49 mG/valsartan 51 mG 1 Tablet(s) Oral two times a day    MEDICATIONS  (PRN):  ALBUTerol    90 MICROgram(s) HFA Inhaler 2 Puff(s) Inhalation every 6 hours PRN Shortness of Breath and/or Wheezing

## 2021-11-10 NOTE — PROGRESS NOTE ADULT - ASSESSMENT
Acute on chronic systolic HF /Fluid overload /pulmonary HTN    Transfer to Tele  Patient remains fluid overloaded on exam  Continue Bumex 2 mg IV push, then start a Bumex gtt at 1 mg/hr   Continue 3% Hypertonic Saline 150ml BID  Get BMP twice daily   Maintain potassium >4.0, Mg >2.1  Strict intake and output  Daily weight   Plan discussed with primary team  Will continue to follow    Acute on chronic systolic HF /Fluid overload /pulmonary HTN    Transfer to Tele  Patient remains fluid overloaded on exam  Continue Bumex gtt at 1 mg/hr   Continue 3% Hypertonic Saline 150ml BID  Get BMP twice daily   Maintain potassium >4.0, Mg >2.1  Strict intake and output  Daily weight   Plan discussed with primary team  Will continue to follow    Acute on chronic systolic HF /Fluid overload /pulmonary HTN    Patient remains fluid overloaded on exam  Continue Bumex gtt at 1 mg/hr   Continue 3% Hypertonic Saline 150ml BID  BMP twice daily   Maintain potassium >4.0, Mg >2.1  Strict intake and output  Daily weight   Plan discussed with primary team  Will continue to follow

## 2021-11-10 NOTE — CONSULT NOTE ADULT - ASSESSMENT
56 year old female with a pmhx of chronic hypoxic respiratory failure (on 4L home O2) secondary to ( HFrEF 35-40%, GIII DD)  with pulmonary hypertension, HTN, HLD, DM2, active smoker , CVA with residual LE weakness (2014, wheelchair bound),  COPD , mood /depression,  presents of worsening shortness of breath and increase weight found to be in HF exacerbation, cardiology consulted for elevated troponin and uncontrolled HR     Acute decompensated HFrEF likely ischemic   recurrent SVT likely atrial tachycardia   CAD s/p stents  HTN   Hx CVA w/ residual weakness (wheel-chair)   DM 2  COPD on home O2     INCOMPLETE note  56 year old female with a pmhx of chronic hypoxic respiratory failure (on 4L home O2) secondary to ( HFrEF 35-40%, GIII DD)  with pulmonary hypertension, HTN, HLD, DM2, active smoker , CVA with residual LE weakness (2014, wheelchair bound),  COPD , mood /depression,  presents of worsening shortness of breath and increase weight found to be in HF exacerbation, cardiology consulted for elevated troponin and uncontrolled HR     Acute decompensated HFrEF ( 35-40%) likely ischemic in nature, possibly arrythmia induced ?  recurrent SVT likely atrial tachycardia- uncontrolled HR  Mod- sever TR  CAD s/p stents, troponin 0,02-->0.05-->0.06, no ischemic changes on EKG  HTN   Hx CVA w/ residual weakness (wheel-chair)   DM 2  COPD on home O2     - C/w Diuresis with Bumex, Monitor in/out, Daily weight, Fluid restriction, Replete lytes aggressively   - Trend troponin, repeat EKG in the AM   - C/w metoprolol, Aspirin, Plavix, Entresto   - Ad Cardizem 30 QID for now for better control of HR, have hx of COPD  - EP consult   - INCOMPLETE note pending attending   56 year old female with a pmhx of chronic hypoxic respiratory failure (on 4L home O2) secondary to ( HFrEF 35-40%, GIII DD)  with pulmonary hypertension, HTN, HLD, DM2, active smoker , CVA with residual LE weakness (2014, wheelchair bound),  COPD , mood /depression,  presents of worsening shortness of breath and increase weight found to be in HF exacerbation, cardiology consulted for elevated troponin.     IMPRESSION  ·	Mild troponemia, likely demand ischemia from decompensated HF.   ·	HFREF - decompensated but some improvement with diuresis. not at baseline yet.   ·	CAD sp recent stent RCA 6/’21.   ·	Recurrent SVT-likely atrial tachycardia - prev noted to need ablation in past; amiodarone, bb + cardizem.   ·	Other hx:  HTN, HLD, DM2, active smoker , CVA with residual LE weakness (2014, wheelchair bound),  COPD on 4L o2 at home , mood /depression  ----  Echo:	6/’21:  EF 35-40.  G3dd. mod red rvsf. Mild mod mr. mod sev tr. mild ai. Mod ph (pasp 50/ra 15).   Cath:	6/’21: (angina/abn stress) lad/dg mild. Cx li. Rca m90.  // mid rca zaki x1.   ----  Labs:	trop .02 >. .05 >> .06        PLAN  - hf already following.  continue diuresis per hf recs.   - C/w metoprolol, Aspirin, Plavix, Entresto   - no need to add amiodarone back on at this time.   - repeat TTE    - INCOMPLETE note pending attending   56 year old female with a pmhx of chronic hypoxic respiratory failure (on 4L home O2) secondary to ( HFrEF 35-40%, GIII DD)  with pulmonary hypertension, HTN, HLD, DM2, active smoker , CVA with residual LE weakness (2014, wheelchair bound),  COPD , mood /depression,  presents of worsening shortness of breath and increase weight found to be in HF exacerbation, cardiology consulted for elevated troponin.     IMPRESSION  ·	Mild troponemia, likely demand ischemia from decompensated HF.   ·	HFREF - decompensated but some improvement with diuresis. not at baseline yet.   ·	CAD sp recent stent RCA 6/’21.   ·	Recurrent SVT-likely atrial tachycardia - prev noted to need ablation in past; amiodarone, bb + cardizem.   ·	Other hx:  HTN, HLD, DM2, active smoker , CVA with residual LE weakness (2014, wheelchair bound),  COPD on 4L o2 at home , mood /depression  ----  Echo:	6/’21:  EF 35-40.  G3dd. mod red rvsf. Mild mod mr. mod sev tr. mild ai. Mod ph (pasp 50/ra 15).   Cath:	6/’21: (angina/abn stress) lad/dg mild. Cx li. Rca m90.  // mid rca zaki x1.   ----  Labs:	trop .02 >. .05 >> .06        PLAN  - hf already following.  continue diuresis per hf recs.   - C/w metoprolol, Aspirin, Plavix, Entresto, increase b-blocker as tolerated  - d/c amiodarone back on at this time.   - repeat TTE    recall general cardiology as needed, c/w CHF follow-up

## 2021-11-10 NOTE — PROGRESS NOTE ADULT - ASSESSMENT
56 year old female with a pmhx of chronic hypoxic respiratory failure (on 4L home O2) secondary to ( HFrEF 35-40%, GIII DD)  with pulmonary hypertension, HTN, HLD, DM2, active smoker , CVA with residual LE weakness (2014, wheelchair bound),  COPD , mood /depression,  presents of worsening dyspnea, orthopnea, weight gain concerning for acute hypoxic respiratory failure 2/2 to acute CHF exacerbation.     #Acute Hypoxic Respiratory Failure 2/2 HFmEF exacerbation - no sepsis poa  - p/w dyspnea, orthopnea, ~25lb recent weight gain; PE w  3+ pitting edema, decr bs and crackles; BNP 7542   - CXR w increased b/l opacities and cardiomegaly; CTA neg for PE w findings c/w interstitial pulmonary edema w associated small bilateral pleural effusions  - last echo (6/14/21) w EF35-40% w G3DD & pulm htn - f/u repeat  - HF team consult appreciated  - cont bumex gtt + hypertonic saline, along w GDMT  - monitor daily cxr & bmp bid - maintain k >4, mg >2.1  - strict I&Os, daily weights    #Troponinemia and Tachycardia likely 2/2 fluid overload  - EKG showing sinus tachy; d-dimer 800  - pt does have h/o cad s/p stent - no active chst   - no active chest pain; duplex neg for dvt; cta neg for pe    #tachycardic  - unsure etiology  - EKG revealed sinus tach  - obtaining tropes -pending  - d-dimer ~800; age adjusted 540. suggestive of possible VTE  - Modified wells 6pts(clinical signs/sx of dvt, HR, immobilization/sendetary)-->moderate risk  - renal function is WNL  - will obtain CT angio to rule out PE.  - dupplex still ordered for LUE swelling  - patient has been on lovenox ppx since admission      #CAD s/p PCI (6/2021)  - c/w aspirin and plavix     #Atrial Tachycardia  - seen by EP during admission in 6/21  - was started on amiodarone, has not picked up from pharmacy since 6/21  - will hold amiodarone   - c/w toprol     #HLD  - c/w Fenofibrate and Lipitor     #Hypertension  - uncontrolled   - c/w toprol, entresto     #Mood disorder  - c/w seroquil 100mg QHS  - no longer taking depakote     #COPD  - c/w Symbicort     #DM type 2  - holding Metformin and Glipizide   - start basal bolus if blood glucose > 180     #Vitamin D Deficiency   - c/w vit D 50,000 Q weekly     #Hx of CVA with residual LE weakness   - uses wheelchair to ambulate   - c/w aspirin and plavix     #DVT PPX: Lovenox   #GI PPX: Protonix   #activity as tolerated  #Diet: DASH   #dispo: transfer to tele.    56 year old female with a pmhx of chronic hypoxic respiratory failure (on 4L home O2) secondary to ( HFrEF 35-40%, GIII DD)  with pulmonary hypertension, HTN, HLD, DM2, active smoker , CVA with residual LE weakness (2014, wheelchair bound),  COPD , mood /depression,  presents of worsening dyspnea, orthopnea, weight gain concerning for acute hypoxic respiratory failure 2/2 to acute CHF exacerbation.     #Acute Hypoxic Respiratory Failure 2/2 HFmEF exacerbation - no sepsis poa  #H/o HFmEF  #HTN  - p/w dyspnea, orthopnea, ~25lb recent weight gain; PE w  3+ pitting edema, decr bs and crackles; BNP 7542   - CXR w increased b/l opacities and cardiomegaly; CTA neg for PE w findings c/w interstitial pulmonary edema w associated small bilateral pleural effusions  - last echo (6/14/21) w EF35-40% w G3DD & pulm htn; on torsemide at home  - HF team consult appreciated  - cont bumex gtt + hypertonic saline, along w GDMT (entresto, bb)  - f/u repeat echo  - monitor daily cxr & bmp bid - maintain k >4, mg >2.1  - strict I&Os, daily weights    #Tachycardia w h/o recurrent SVT - likely atrial tachycardia  - EKG showing sinus tachy on admission; d-dimer 800  - duplex neg for dvt; cta neg for pe  - seen by EP during admission in june (was started on amiodarone but reportedly never picked up medication after d/c)  - cont bb, holding off on amio for now - reconsult EP - monitor on tele  - cont lovenox ppx bid    #CAD s/p PCI (6/2021)  #Troponinemia likely 2/2 fluid overload doubt acs  - Trop 02>.05>.06; EKG w no ST elevations; pt w no active chest pain  - c/w dapt, statin, bb  - monitor clinically    #COPD on 4L home O2  #CORNELIUS on CPAP  - currently saturating well on NC  - cont inhalers and nebs prn  - will inquire about bringing home CPAP to use qHS    #H/o CVA w residual LE weakness  #H/o HLD  - f/u lipid panel  - cont fenofibrate and lipitor, along w dapt      #DM type 2 (last A1c 06/2021 6.8)  - monitor FS - add basal bolus insulin if needed    #H/o Mood Disorder  - per allscripts, pt alon takes seroquel and was previously prescribed depakote and ativan    #Morbid Obesity (BMI 45.8)  #Active Smoker  - counseled smoking cessation, diet and exercise  - outpatient nutrition follow up    #Vitamin D Deficiency   - cont vit D 50,000 Q weekly     DVT ppx: Lovenox   GI ppx: Protonix   Diet: DASH   Activity: IAT - uses wheelchair     56 year old female with a pmhx of chronic hypoxic respiratory failure (on 4L home O2) secondary to ( HFrEF 35-40%, GIII DD)  with pulmonary hypertension, HTN, HLD, DM2, active smoker , CVA with residual LE weakness (2014, wheelchair bound),  COPD , mood /depression,  presents of worsening dyspnea, orthopnea, weight gain concerning for acute hypoxic respiratory failure 2/2 to acute CHF exacerbation.     #Acute Hypoxic Respiratory Failure 2/2 HFmEF exacerbation - no sepsis poa  #H/o HFmEF  #HTN  - p/w dyspnea, orthopnea, ~25lb recent weight gain; PE w  3+ pitting edema, decr bs and crackles; BNP 7542   - CXR w increased b/l opacities and cardiomegaly; CTA neg for PE w findings c/w interstitial pulmonary edema w associated small bilateral pleural effusions  - last echo (6/14/21) w EF35-40% w G3DD & pulm htn; on torsemide at home  - HF team consult appreciated  - cont bumex gtt + hypertonic saline, along w GDMT (entresto, bb)  - f/u repeat echo  - monitor daily cxr & bmp bid - maintain k >4, mg >2.1  - strict I&Os, daily weights    #Tachycardia w h/o recurrent SVT - likely atrial tachycardia  - EKG showing sinus tachy on admission; d-dimer 800  - duplex neg for dvt; cta neg for pe  - seen by EP during admission in june (was started on amiodarone but reportedly never picked up medication after d/c)  - cont bb, holding off on amio for now - monitor on tele - possible reconsult EP   - cont lovenox ppx bid    #CAD s/p PCI (6/2021)  #Troponinemia likely 2/2 fluid overload vs less likely acs  - Trop 02>.05>.06; EKG w no ST elevations; pt w no active chest pain  - c/w dapt, statin, bb  - f/u repeat trops, CK, CKMB  - Cardio eval (Dr. Murphy) placed    #COPD on 4L home O2  #CORNELIUS on CPAP  - currently saturating well on NC  - cont inhalers and nebs prn  - will inquire about bringing home CPAP to use qHS    #H/o CVA w residual LE weakness  #H/o HLD  - f/u lipid panel  - cont fenofibrate and lipitor, along w dapt      #DM type 2 (last A1c 06/2021 6.8)  - monitor FS - add basal bolus insulin if needed    #H/o Mood Disorder  - per allscripts, pt alon takes seroquel and was previously prescribed depakote and ativan    #Morbid Obesity (BMI 45.8)  #Active Smoker  - counseled smoking cessation, diet and exercise  - outpatient nutrition follow up    #Vitamin D Deficiency   - cont vit D 50,000 Q weekly     DVT ppx: Lovenox   GI ppx: Protonix   Diet: DASH   Activity: IAT - uses wheelchair

## 2021-11-10 NOTE — PROGRESS NOTE ADULT - ASSESSMENT
A 56 year old female with a PMH of chronic hypoxic respiratory failure (on 4L home O2) secondary to ( HFrEF 35-40%, GIII DD)  with pulmonary hypertension, HTN, HLD, DM2, active smoker , CVA with residual LE weakness (2014, wheelchair bound),  COPD , mood /depression,  presents of worsening shortness of breath and increase weight with  B/L LE swelling over past week.    Acute on chronic HFrEF  DM-2 / HTN / DL  Chronic hypoxic respiratory failure  Pulmonary HTN  Tobacco use  H/O CVA with residual LE weakness  COPD  Mood disorder / Depression                PLAN:    ·	On Bumex infusion and hypertonic saline as per HF specialist recommendation.   ·	HF f/u  ·	A negative balance of 4200 over the last 24 hrs  ·	Check i's and o's and daily wt  ·	Low salt diet and water restriction to 1.5 L/D  ·	Troponin trending up. Repeat CK, CKMB an Trop  ·	Cardiology eval  ·	Had cath done in June 2021 and RICARDO placed in mid RCA  ·	GDMT. On Metoprolol and Entresto  ·	Cont her other meds    Progress Note Handoff    Pending (specify):  Consults_________, Tests________, Test Results_______, Other___Fluid overload_____  Family discussion:  Disposition: Home___/SNF___/Other________/Unknown at this time________    Stanley Domingo MD  Spectra: [phone]

## 2021-11-10 NOTE — PROGRESS NOTE ADULT - SUBJECTIVE AND OBJECTIVE BOX
USMAN WILLIAN  56y Female    CHIEF COMPLAINT:    Patient is a 56y old  Female who presents with a chief complaint of HFmEF exacerbation (10 Nov 2021 11:13)      INTERVAL HPI/OVERNIGHT EVENTS:    Patient seen and examined. On 4L NC feels comfortable. C/O LE swelling. Fluid overload. No palpitations or cp    ROS: All other systems are negative.    Vital Signs:    T(F): 97.8 (11-10-21 @ 10:10), Max: 97.8 (21 @ 15:00)  HR: 100 (11-10-21 @ 10:10) (99 - 105)  BP: 146/102 (11-10-21 @ 10:10) (143/103 - 180/115)  RR: 20 (11-10-21 @ 10:10) (18 - 22)  SpO2: 98% (11-10-21 @ 08:23) (98% - 99%)  I&O's Summary    2021 07:01  -  10 Nov 2021 07:00  --------------------------------------------------------  IN: 1700 mL / OUT: 5900 mL / NET: -4200 mL    10 Nov 2021 07:01  -  10 Nov 2021 12:52  --------------------------------------------------------  IN: 570 mL / OUT: 1200 mL / NET: -630 mL      Daily Height in cm: 149.86 (2021 18:42)    Daily Weight in k.7 (10 Nov 2021 05:29)  CAPILLARY BLOOD GLUCOSE      POCT Blood Glucose.: 170 mg/dL (2021 21:22)      PHYSICAL EXAM:    GENERAL:  NAD  SKIN: No rashes or lesions  HENT: Atraumatic. Normocephalic. PERRL. Moist membranes.  NECK: Supple, No JVD. No lymphadenopathy.  PULMONARY: Decreased BS in the bases B/L. No wheezing. No rales  CVS: Normal S1, S2. Rate and Rhythm are regular. No murmurs.  ABDOMEN/GI: Soft, Nontender, Nondistended; BS present  EXTREMITIES: Peripheral pulses intact. 2+ pitting edema B/L LE.  NEUROLOGIC:  No motor or sensory deficit.  PSYCH: Alert & oriented x 3    Consultant(s) Notes Reviewed:  [x ] YES  [ ] NO  Care Discussed with Consultants/Other Providers [ x] YES  [ ] NO    EKG reviewed  Telemetry reviewed    LABS:                        15.1   8.78  )-----------( 292      ( 10 Nov 2021 04:30 )             47.0     11-10    135  |  93<L>  |  16  ----------------------------<  176<H>  3.7   |  24  |  0.9    Ca    8.8      10 Nov 2021 04:30  Mg     1.3     11-10    TPro  6.8  /  Alb  3.4<L>  /  TBili  1.3<H>  /  DBili  x   /  AST  28  /  ALT  10  /  AlkPhos  102  11-10    PT/INR - ( 2021 06:19 )   PT: 13.60 sec;   INR: 1.18 ratio         PTT - ( 2021 06:19 )  PTT:30.1 sec  Serum Pro-Brain Natriuretic Peptide: 7542 pg/mL (21 @ 11:27)    Trop 0.06, CKMB --, CK --, 21 @ 22:51  Trop 0.05, CKMB --, CK --, 21 @ 16:45  Trop 0.02, CKMB --, CK --, 21 @ 11:27        RADIOLOGY & ADDITIONAL TESTS:      Imaging or report Personally Reviewed:  [ ] YES  [ ] NO    Medications:  Standing  aspirin  chewable 81 milliGRAM(s) Oral daily  atorvastatin 80 milliGRAM(s) Oral at bedtime  budesonide 160 MICROgram(s)/formoterol 4.5 MICROgram(s) Inhaler 2 Puff(s) Inhalation two times a day  buMETAnide Infusion 1 mG/Hr IV Continuous <Continuous>  chlorhexidine 4% Liquid 1 Application(s) Topical <User Schedule>  clopidogrel Tablet 75 milliGRAM(s) Oral daily  enoxaparin Injectable 40 milliGRAM(s) SubCutaneous two times a day  fenofibrate Tablet 145 milliGRAM(s) Oral daily  magnesium oxide 400 milliGRAM(s) Oral three times a day with meals  magnesium sulfate  IVPB 2 Gram(s) IV Intermittent once  metoprolol succinate ER 50 milliGRAM(s) Oral daily  omega-3-Acid Ethyl Esters 2 Gram(s) Oral two times a day  pantoprazole    Tablet 40 milliGRAM(s) Oral before breakfast  potassium chloride   Powder 40 milliEquivalent(s) Oral once  sacubitril 49 mG/valsartan 51 mG 1 Tablet(s) Oral two times a day  sodium chloride 3%. 150 milliLiter(s) IV Continuous <Continuous>    PRN Meds  ALBUTerol    90 MICROgram(s) HFA Inhaler 2 Puff(s) Inhalation every 6 hours PRN      Case discussed with resident    Care discussed with pt/family

## 2021-11-10 NOTE — CONSULT NOTE ADULT - SUBJECTIVE AND OBJECTIVE BOX
Patient is a 56y old  Female who presents with a chief complaint of HFmEF exacerbation (10 Nov 2021 11:13)      HPI:  56 year old female with a pmhx of chronic hypoxic respiratory failure (on 4L home O2) secondary to ( HFrEF 35-40%, GIII DD)  with pulmonary hypertension, HTN, HLD, DM2, active smoker , CVA with residual LE weakness (2014, wheelchair bound),  COPD , mood /depression, CAD s/p stent last in June for RCA presents of worsening shortness of breath and increase weight with  B/L LE over past week. Associated with increase oxygen use, mild SOB at rest orthopnea, and palpitations. She endorses compliance with home torsemide, as well as fluid restriction and low salt diet. She denies CP.   In ED VITALS: Temp 98.6F, , / 112, RR 18 Spo2 98 on 3L. Her O2 was increased to 5L due to feeling uncomfortable. CXR: increased bilateral opacities. BNP 7542, Troponin 0.02. Given lasix IV in ED. Admitted fort HFrEF exacerbation.  (08 Nov 2021 14:20)      PAST MEDICAL & SURGICAL HISTORY:  Hypertension    Hyperlipidemia    Anxiety and depression    COPD, severe    CHF (congestive heart failure)    Cerebrovascular accident (CVA)  Multiple    Type 2 diabetes mellitus    CKD (chronic kidney disease), stage II    No significant past surgical history        FAMILY HISTORY:  No pertinent family history in first degree relatives    :  No known cardiovacular family hisotry   ALLERGIC/IMMUNOLOGIC:   No active allergic or immunologic issues      Allergies    No Known Allergies          PHYSICAL EXAM    ICU Vital Signs Last 24 Hrs  T(C): 36 (10 Nov 2021 14:00), Max: 36.6 (09 Nov 2021 15:00)  T(F): 96.8 (10 Nov 2021 14:00), Max: 97.8 (09 Nov 2021 15:00)  HR: 82 (10 Nov 2021 14:00) (82 - 105)  BP: 131/88 (10 Nov 2021 14:00) (131/88 - 180/115)  RR: 20 (10 Nov 2021 14:00) (18 - 22)  SpO2: 98% (10 Nov 2021 08:23) (98% - 99%)          11-09-21 @ 07:01  -  11-10-21 @ 07:00  --------------------------------------------------------  IN:    Bumetanide: 50 mL    IV PiggyBack: 50 mL    Oral Fluid: 1550 mL    sodium chloride 3%: 50 mL  Total IN: 1700 mL    OUT:    Voided (mL): 5900 mL  Total OUT: 5900 mL    Total NET: -4200 mL      11-10-21 @ 07:01  -  11-10-21 @ 14:33  --------------------------------------------------------  IN:    Bumetanide: 40 mL    IV PiggyBack: 50 mL    Oral Fluid: 540 mL  Total IN: 630 mL    OUT:    Voided (mL): 1200 mL  Total OUT: 1200 mL    Total NET: -570 mL          LABS:                          15.1   8.78  )-----------( 292      ( 10 Nov 2021 04:30 )             47.0                                               11-10    135  |  93<L>  |  16  ----------------------------<  176<H>  3.7   |  24  |  0.9    Ca    8.8      10 Nov 2021 04:30  Mg     1.3     11-10    TPro  6.8  /  Alb  3.4<L>  /  TBili  1.3<H>  /  DBili  x   /  AST  28  /  ALT  10  /  AlkPhos  102  11-10      PT/INR - ( 09 Nov 2021 06:19 )   PT: 13.60 sec;   INR: 1.18 ratio         PTT - ( 09 Nov 2021 06:19 )  PTT:30.1 sec                                           CARDIAC MARKERS ( 09 Nov 2021 22:51 )  x     / 0.06 ng/mL / x     / x     / x      CARDIAC MARKERS ( 09 Nov 2021 16:45 )  x     / 0.05 ng/mL / x     / x     / x                                                LIVER FUNCTIONS - ( 10 Nov 2021 04:30 )  Alb: 3.4 g/dL / Pro: 6.8 g/dL / ALK PHOS: 102 U/L / ALT: 10 U/L / AST: 28 U/L / GGT: x                                                           Home Medications:  Albuterol (Eqv-ProAir HFA) 90 mcg/inh inhalation aerosol: 2 puff(s) inhaled every 6 hours, As Needed (07 Jun 2021 11:22)  aspirin 81 mg oral tablet: 1 tab(s) orally once a day (07 Jun 2021 11:22)  fenofibrate 145 mg oral tablet: 1 tab(s) orally once a day (07 Jun 2021 11:22)  glipiZIDE 10 mg oral tablet: 1 tab(s) orally 2 times a day (07 Jun 2021 11:22)  Lovaza 1000 mg oral capsule: 2 cap(s) orally 2 times a day (08 Nov 2021 14:52)  metFORMIN 1000 mg oral tablet: 1 tab(s) orally 2 times a day Do not take until 6/23 (22 Jun 2021 12:58)  Plavix 75 mg oral tablet: 1 tab(s) orally once a day (07 Jun 2021 11:22)  Vitamin D2 1.25 mg (50,000 intl units) oral capsule: 1 cap(s) orally once a week (08 Nov 2021 14:52)        MEDICATIONS  (STANDING):  aspirin  chewable 81 milliGRAM(s) Oral daily  atorvastatin 80 milliGRAM(s) Oral at bedtime  budesonide 160 MICROgram(s)/formoterol 4.5 MICROgram(s) Inhaler 2 Puff(s) Inhalation two times a day  buMETAnide Infusion 1 mG/Hr (5 mL/Hr) IV Continuous <Continuous>  chlorhexidine 4% Liquid 1 Application(s) Topical <User Schedule>  clopidogrel Tablet 75 milliGRAM(s) Oral daily  enoxaparin Injectable 40 milliGRAM(s) SubCutaneous two times a day  fenofibrate Tablet 145 milliGRAM(s) Oral daily  magnesium oxide 400 milliGRAM(s) Oral three times a day with meals  metoprolol succinate ER 50 milliGRAM(s) Oral daily  omega-3-Acid Ethyl Esters 2 Gram(s) Oral two times a day  pantoprazole    Tablet 40 milliGRAM(s) Oral before breakfast  sacubitril 49 mG/valsartan 51 mG 1 Tablet(s) Oral two times a day  sodium chloride 3%. 150 milliLiter(s) (50 mL/Hr) IV Continuous <Continuous>    MEDICATIONS  (PRN):  ALBUTerol    90 MICROgram(s) HFA Inhaler 2 Puff(s) Inhalation every 6 hours PRN Shortness of Breath and/or Wheezing       Patient is a 56y old  Female who presents with a chief complaint of HFmEF exacerbation (10 Nov 2021 11:13)      HPI:  56 year old female with a pmhx of chronic hypoxic respiratory failure (on 4L home O2) secondary to ( HFrEF 35-40%, GIII DD)  with pulmonary hypertension, HTN, HLD, DM2, active smoker , CVA with residual LE weakness (2014, wheelchair bound),  COPD , mood /depression, CAD s/p stent last in June for RCA presents of worsening shortness of breath and increase weight with  B/L LE over past week. Associated with increase oxygen use, mild SOB at rest orthopnea, and palpitations.   In ED VITALS: Temp 98.6F, , / 112, RR 18 Spo2 98 on 3L. Her O2 was increased to 5L due to feeling uncomfortable. CXR: increased bilateral opacities. BNP 7542, Troponin 0.02. Given lasix IV in ED. Admitted fort HFrEF exacerbation.  (08 Nov 2021 14:20)    Patient does not take torsemide regularly as she have to urinate alot, that is reason for exacerbation, not enough support at home. she does not know which medication she takes, very questionable compliance with medication. She was seen by EP last admission and was diagnosed with atrial tachycardia, was discharged on amio ( non compliant ) and metoprolol.       On time of encounter patient was sweating, denied any CP, worsening SOB. Have abdominal pain, not had a BM recently.     PAST MEDICAL & SURGICAL HISTORY:  Hypertension    Hyperlipidemia    Anxiety and depression    COPD, severe    CHF (congestive heart failure)    Cerebrovascular accident (CVA)  Multiple    Type 2 diabetes mellitus    CKD (chronic kidney disease), stage II    No significant past surgical history        FAMILY HISTORY:  No pertinent family history in first degree relatives    :  No known cardiovacular family hisotry   ALLERGIC/IMMUNOLOGIC:   No active allergic or immunologic issues      Allergies    No Known Allergies          PHYSICAL EXAM    GENERAL:  NAD  NECK: Supple, No JVD. .  PULMONARY: Decreased BS in the bases B/L. wheezing bilaterally . No rales  CVS: tachycardiac, systolic murmur   ABDOMEN/GI: tender right sided mainly   EXTREMITIES: Peripheral pulses intact. 2+ pitting edema B/L LE.  NEUROLOGIC:  No motor or sensory deficit.  PSYCH: Alert & oriented x 3      Vital Signs Last 24 Hrs  T(C): 36 (10 Nov 2021 14:00), Max: 36.6 (09 Nov 2021 15:00)  T(F): 96.8 (10 Nov 2021 14:00), Max: 97.8 (09 Nov 2021 15:00)  HR: 82 (10 Nov 2021 14:00) (82 - 105)  BP: 131/88 (10 Nov 2021 14:00) (131/88 - 180/115)  RR: 20 (10 Nov 2021 14:00) (18 - 22)  SpO2: 98% (10 Nov 2021 08:23) (98% - 99%)          11-09-21 @ 07:01  -  11-10-21 @ 07:00  --------------------------------------------------------  IN:    Bumetanide: 50 mL    IV PiggyBack: 50 mL    Oral Fluid: 1550 mL    sodium chloride 3%: 50 mL  Total IN: 1700 mL    OUT:    Voided (mL): 5900 mL  Total OUT: 5900 mL    Total NET: -4200 mL      11-10-21 @ 07:01  -  11-10-21 @ 14:33  --------------------------------------------------------  IN:    Bumetanide: 40 mL    IV PiggyBack: 50 mL    Oral Fluid: 540 mL  Total IN: 630 mL    OUT:    Voided (mL): 1200 mL  Total OUT: 1200 mL    Total NET: -570 mL          LABS:                          15.1   8.78  )-----------( 292      ( 10 Nov 2021 04:30 )             47.0                                               11-10    135  |  93<L>  |  16  ----------------------------<  176<H>  3.7   |  24  |  0.9    Ca    8.8      10 Nov 2021 04:30  Mg     1.3     11-10    TPro  6.8  /  Alb  3.4<L>  /  TBili  1.3<H>  /  DBili  x   /  AST  28  /  ALT  10  /  AlkPhos  102  11-10      PT/INR - ( 09 Nov 2021 06:19 )   PT: 13.60 sec;   INR: 1.18 ratio         PTT - ( 09 Nov 2021 06:19 )  PTT:30.1 sec                                           CARDIAC MARKERS ( 09 Nov 2021 22:51 )  x     / 0.06 ng/mL / x     / x     / x      CARDIAC MARKERS ( 09 Nov 2021 16:45 )  x     / 0.05 ng/mL / x     / x     / x                                                LIVER FUNCTIONS - ( 10 Nov 2021 04:30 )  Alb: 3.4 g/dL / Pro: 6.8 g/dL / ALK PHOS: 102 U/L / ALT: 10 U/L / AST: 28 U/L / GGT: x                                                           Home Medications:  Albuterol (Eqv-ProAir HFA) 90 mcg/inh inhalation aerosol: 2 puff(s) inhaled every 6 hours, As Needed (07 Jun 2021 11:22)  aspirin 81 mg oral tablet: 1 tab(s) orally once a day (07 Jun 2021 11:22)  fenofibrate 145 mg oral tablet: 1 tab(s) orally once a day (07 Jun 2021 11:22)  glipiZIDE 10 mg oral tablet: 1 tab(s) orally 2 times a day (07 Jun 2021 11:22)  Lovaza 1000 mg oral capsule: 2 cap(s) orally 2 times a day (08 Nov 2021 14:52)  metFORMIN 1000 mg oral tablet: 1 tab(s) orally 2 times a day Do not take until 6/23 (22 Jun 2021 12:58)  Plavix 75 mg oral tablet: 1 tab(s) orally once a day (07 Jun 2021 11:22)  Vitamin D2 1.25 mg (50,000 intl units) oral capsule: 1 cap(s) orally once a week (08 Nov 2021 14:52)        MEDICATIONS  (STANDING):  aspirin  chewable 81 milliGRAM(s) Oral daily  atorvastatin 80 milliGRAM(s) Oral at bedtime  budesonide 160 MICROgram(s)/formoterol 4.5 MICROgram(s) Inhaler 2 Puff(s) Inhalation two times a day  buMETAnide Infusion 1 mG/Hr (5 mL/Hr) IV Continuous <Continuous>  chlorhexidine 4% Liquid 1 Application(s) Topical <User Schedule>  clopidogrel Tablet 75 milliGRAM(s) Oral daily  enoxaparin Injectable 40 milliGRAM(s) SubCutaneous two times a day  fenofibrate Tablet 145 milliGRAM(s) Oral daily  magnesium oxide 400 milliGRAM(s) Oral three times a day with meals  metoprolol succinate ER 50 milliGRAM(s) Oral daily  omega-3-Acid Ethyl Esters 2 Gram(s) Oral two times a day  pantoprazole    Tablet 40 milliGRAM(s) Oral before breakfast  sacubitril 49 mG/valsartan 51 mG 1 Tablet(s) Oral two times a day  sodium chloride 3%. 150 milliLiter(s) (50 mL/Hr) IV Continuous <Continuous>    MEDICATIONS  (PRN):  ALBUTerol    90 MICROgram(s) HFA Inhaler 2 Puff(s) Inhalation every 6 hours PRN Shortness of Breath and/or Wheezing       Patient is a 56y old  Female who presents with a chief complaint of HFmEF exacerbation (10 Nov 2021 11:13)      HPI:    56 year old female with a pmhx of chronic hypoxic respiratory failure (on 4L home O2) secondary to ( HFrEF 35-40%, GIII DD)  with pulmonary hypertension, HTN, HLD, DM2, active smoker , CVA with residual LE weakness (2014, wheelchair bound),  COPD , mood /depression, CAD s/p stent last in June for RCA presents of worsening shortness of breath and increase weight with  B/L LE over past week. Associated with increase oxygen use, mild SOB at rest orthopnea, and palpitations.   In ED VITALS: Temp 98.6F, , / 112, RR 18 Spo2 98 on 3L. Her O2 was increased to 5L due to feeling uncomfortable. CXR: increased bilateral opacities. BNP 7542, Troponin 0.02. Given lasix IV in ED. Admitted fort HFrEF exacerbation.  (08 Nov 2021 14:20)    Patient does not take torsemide regularly as she have to urinate alot, that is reason for exacerbation, not enough support at home. she does not know which medication she takes, very questionable compliance with medication. She was seen by EP last admission and was diagnosed with atrial tachycardia, was discharged on amio ( non compliant ) and metoprolol.       On time of encounter patient was sweating, denied any CP, worsening SOB. Have abdominal pain, not had a BM recently.     PAST MEDICAL & SURGICAL HISTORY:  Hypertension    Hyperlipidemia    Anxiety and depression    COPD, severe    CHF (congestive heart failure)    Cerebrovascular accident (CVA)  Multiple    Type 2 diabetes mellitus    CKD (chronic kidney disease), stage II    No significant past surgical history        FAMILY HISTORY:  No pertinent family history in first degree relatives    :  No known cardiovacular family hisotry   ALLERGIC/IMMUNOLOGIC:   No active allergic or immunologic issues      Allergies    No Known Allergies          PHYSICAL EXAM    GENERAL:  NAD  NECK: Supple, No JVD. .  PULMONARY: Decreased BS in the bases B/L. wheezing bilaterally . No rales  CVS: tachycardiac, systolic murmur   ABDOMEN/GI: tender right sided mainly   EXTREMITIES: Peripheral pulses intact. 2+ pitting edema B/L LE.  NEUROLOGIC:  No motor or sensory deficit.  PSYCH: Alert & oriented x 3      Vital Signs Last 24 Hrs  T(C): 36 (10 Nov 2021 14:00), Max: 36.6 (09 Nov 2021 15:00)  T(F): 96.8 (10 Nov 2021 14:00), Max: 97.8 (09 Nov 2021 15:00)  HR: 82 (10 Nov 2021 14:00) (82 - 105)  BP: 131/88 (10 Nov 2021 14:00) (131/88 - 180/115)  RR: 20 (10 Nov 2021 14:00) (18 - 22)  SpO2: 98% (10 Nov 2021 08:23) (98% - 99%)          11-09-21 @ 07:01  -  11-10-21 @ 07:00  --------------------------------------------------------  IN:    Bumetanide: 50 mL    IV PiggyBack: 50 mL    Oral Fluid: 1550 mL    sodium chloride 3%: 50 mL  Total IN: 1700 mL    OUT:    Voided (mL): 5900 mL  Total OUT: 5900 mL    Total NET: -4200 mL      11-10-21 @ 07:01  -  11-10-21 @ 14:33  --------------------------------------------------------  IN:    Bumetanide: 40 mL    IV PiggyBack: 50 mL    Oral Fluid: 540 mL  Total IN: 630 mL    OUT:    Voided (mL): 1200 mL  Total OUT: 1200 mL    Total NET: -570 mL          LABS:                          15.1   8.78  )-----------( 292      ( 10 Nov 2021 04:30 )             47.0                                               11-10    135  |  93<L>  |  16  ----------------------------<  176<H>  3.7   |  24  |  0.9    Ca    8.8      10 Nov 2021 04:30  Mg     1.3     11-10    TPro  6.8  /  Alb  3.4<L>  /  TBili  1.3<H>  /  DBili  x   /  AST  28  /  ALT  10  /  AlkPhos  102  11-10      PT/INR - ( 09 Nov 2021 06:19 )   PT: 13.60 sec;   INR: 1.18 ratio         PTT - ( 09 Nov 2021 06:19 )  PTT:30.1 sec                                           CARDIAC MARKERS ( 09 Nov 2021 22:51 )  x     / 0.06 ng/mL / x     / x     / x      CARDIAC MARKERS ( 09 Nov 2021 16:45 )  x     / 0.05 ng/mL / x     / x     / x                                                LIVER FUNCTIONS - ( 10 Nov 2021 04:30 )  Alb: 3.4 g/dL / Pro: 6.8 g/dL / ALK PHOS: 102 U/L / ALT: 10 U/L / AST: 28 U/L / GGT: x                                                           Home Medications:  Albuterol (Eqv-ProAir HFA) 90 mcg/inh inhalation aerosol: 2 puff(s) inhaled every 6 hours, As Needed (07 Jun 2021 11:22)  aspirin 81 mg oral tablet: 1 tab(s) orally once a day (07 Jun 2021 11:22)  fenofibrate 145 mg oral tablet: 1 tab(s) orally once a day (07 Jun 2021 11:22)  glipiZIDE 10 mg oral tablet: 1 tab(s) orally 2 times a day (07 Jun 2021 11:22)  Lovaza 1000 mg oral capsule: 2 cap(s) orally 2 times a day (08 Nov 2021 14:52)  metFORMIN 1000 mg oral tablet: 1 tab(s) orally 2 times a day Do not take until 6/23 (22 Jun 2021 12:58)  Plavix 75 mg oral tablet: 1 tab(s) orally once a day (07 Jun 2021 11:22)  Vitamin D2 1.25 mg (50,000 intl units) oral capsule: 1 cap(s) orally once a week (08 Nov 2021 14:52)        MEDICATIONS  (STANDING):  aspirin  chewable 81 milliGRAM(s) Oral daily  atorvastatin 80 milliGRAM(s) Oral at bedtime  budesonide 160 MICROgram(s)/formoterol 4.5 MICROgram(s) Inhaler 2 Puff(s) Inhalation two times a day  buMETAnide Infusion 1 mG/Hr (5 mL/Hr) IV Continuous <Continuous>  chlorhexidine 4% Liquid 1 Application(s) Topical <User Schedule>  clopidogrel Tablet 75 milliGRAM(s) Oral daily  enoxaparin Injectable 40 milliGRAM(s) SubCutaneous two times a day  fenofibrate Tablet 145 milliGRAM(s) Oral daily  magnesium oxide 400 milliGRAM(s) Oral three times a day with meals  metoprolol succinate ER 50 milliGRAM(s) Oral daily  omega-3-Acid Ethyl Esters 2 Gram(s) Oral two times a day  pantoprazole    Tablet 40 milliGRAM(s) Oral before breakfast  sacubitril 49 mG/valsartan 51 mG 1 Tablet(s) Oral two times a day  sodium chloride 3%. 150 milliLiter(s) (50 mL/Hr) IV Continuous <Continuous>    MEDICATIONS  (PRN):  ALBUTerol    90 MICROgram(s) HFA Inhaler 2 Puff(s) Inhalation every 6 hours PRN Shortness of Breath and/or Wheezing

## 2021-11-10 NOTE — PROGRESS NOTE ADULT - SUBJECTIVE AND OBJECTIVE BOX
SUBJECTIVE:    Patient is a 56y old Female who presents with a chief complaint of worsening dyspnea, orthopnea and weight gain.    Currently admitted to medicine with the primary diagnosis of CHF exacerbation    24 hour update:  Today is hospital day 2d. This morning she is resting comfortably in bed and reports no new issues or overnight events.      PAST MEDICAL & SURGICAL HISTORY  Hypertension    Hyperlipidemia    Anxiety and depression    COPD, severe    CHF (congestive heart failure)    Cerebrovascular accident (CVA)  Multiple    Type 2 diabetes mellitus    CKD (chronic kidney disease), stage II    No significant past surgical history      SOCIAL HISTORY:    ALLERGIES:  No Known Allergies    MEDICATIONS:  STANDING MEDICATIONS  aspirin  chewable 81 milliGRAM(s) Oral daily  atorvastatin 80 milliGRAM(s) Oral at bedtime  budesonide 160 MICROgram(s)/formoterol 4.5 MICROgram(s) Inhaler 2 Puff(s) Inhalation two times a day  buMETAnide Infusion 1 mG/Hr IV Continuous <Continuous>  chlorhexidine 4% Liquid 1 Application(s) Topical <User Schedule>  clopidogrel Tablet 75 milliGRAM(s) Oral daily  enoxaparin Injectable 40 milliGRAM(s) SubCutaneous daily  fenofibrate Tablet 145 milliGRAM(s) Oral daily  metoprolol succinate ER 50 milliGRAM(s) Oral daily  omega-3-Acid Ethyl Esters 2 Gram(s) Oral two times a day  pantoprazole    Tablet 40 milliGRAM(s) Oral before breakfast  sacubitril 49 mG/valsartan 51 mG 1 Tablet(s) Oral two times a day  sodium chloride 3%. 150 milliLiter(s) IV Continuous <Continuous>    PRN MEDICATIONS  ALBUTerol    90 MICROgram(s) HFA Inhaler 2 Puff(s) Inhalation every 6 hours PRN    VITALS:   T(F): 97.8  HR: 100  BP: 146/102  RR: 20  SpO2: 98%    PHYSICAL EXAM:  GENERAL: NAD  NERVOUS SYSTEM: AAOx3  CHEST/LUNG: decr bs b/l +crackles  HEART: +s1s2 RRR  ABDOMEN: soft, NT/ND  EXTREMITIES:  3+ pitting edema    LABS:                        15.1   8.78  )-----------( 292      ( 10 Nov 2021 04:30 )             47.0     135  |  93<L>  |  16  ----------------------------<  176<H>  3.7   |  24  |  0.9    Ca    8.8      10 Nov 2021 04:30  Mg     1.3     11-10    TPro  6.8  /  Alb  3.4<L>  /  TBili  1.3<H>  /  DBili  x   /  AST  28  /  ALT  10  /  AlkPhos  102  11-10    PT/INR - ( 09 Nov 2021 06:19 )   PT: 13.60 sec;   INR: 1.18 ratio    PTT - ( 09 Nov 2021 06:19 )  PTT:30.1 sec    Troponin T, Serum: 0.06 ng/mL *HH* (11-09-21 @ 22:51)  Troponin T, Serum: 0.05 ng/mL *HH* (11-09-21 @ 16:45)  Troponin T, Serum: 0.02 ng/mL (11-08-21 @ 11:27)    Serum Pro-Brain Natriuretic Peptide: 7542 pg/mL (11-08-21 @ 11:27)    IMAGING:

## 2021-11-11 LAB
24R-OH-CALCIDIOL SERPL-MCNC: 14 NG/ML — LOW (ref 30–80)
A1C WITH ESTIMATED AVERAGE GLUCOSE RESULT: 7.7 % — HIGH (ref 4–5.6)
ALBUMIN SERPL ELPH-MCNC: 3.3 G/DL — LOW (ref 3.5–5.2)
ALBUMIN SERPL ELPH-MCNC: 3.4 G/DL — LOW (ref 3.5–5.2)
ALP SERPL-CCNC: 77 U/L — SIGNIFICANT CHANGE UP (ref 30–115)
ALP SERPL-CCNC: 87 U/L — SIGNIFICANT CHANGE UP (ref 30–115)
ALT FLD-CCNC: 10 U/L — SIGNIFICANT CHANGE UP (ref 0–41)
ALT FLD-CCNC: 10 U/L — SIGNIFICANT CHANGE UP (ref 0–41)
ANION GAP SERPL CALC-SCNC: 18 MMOL/L — HIGH (ref 7–14)
ANION GAP SERPL CALC-SCNC: 19 MMOL/L — HIGH (ref 7–14)
APTT BLD: 29.8 SEC — SIGNIFICANT CHANGE UP (ref 27–39.2)
APTT BLD: 55.1 SEC — HIGH (ref 27–39.2)
APTT BLD: 60.2 SEC — HIGH (ref 27–39.2)
APTT BLD: 70.4 SEC — CRITICAL HIGH (ref 27–39.2)
AST SERPL-CCNC: 36 U/L — SIGNIFICANT CHANGE UP (ref 0–41)
AST SERPL-CCNC: 41 U/L — SIGNIFICANT CHANGE UP (ref 0–41)
BASOPHILS # BLD AUTO: 0.08 K/UL — SIGNIFICANT CHANGE UP (ref 0–0.2)
BASOPHILS NFR BLD AUTO: 1.4 % — HIGH (ref 0–1)
BILIRUB SERPL-MCNC: 1.2 MG/DL — SIGNIFICANT CHANGE UP (ref 0.2–1.2)
BILIRUB SERPL-MCNC: 1.3 MG/DL — HIGH (ref 0.2–1.2)
BUN SERPL-MCNC: 14 MG/DL — SIGNIFICANT CHANGE UP (ref 10–20)
BUN SERPL-MCNC: 15 MG/DL — SIGNIFICANT CHANGE UP (ref 10–20)
CALCIUM SERPL-MCNC: 8.2 MG/DL — LOW (ref 8.5–10.1)
CALCIUM SERPL-MCNC: 8.3 MG/DL — LOW (ref 8.5–10.1)
CHLORIDE SERPL-SCNC: 95 MMOL/L — LOW (ref 98–110)
CHLORIDE SERPL-SCNC: 96 MMOL/L — LOW (ref 98–110)
CK MB CFR SERPL CALC: 11.7 NG/ML — HIGH (ref 0.6–6.3)
CK MB CFR SERPL CALC: 8 NG/ML — HIGH (ref 0.6–6.3)
CK SERPL-CCNC: 141 U/L — SIGNIFICANT CHANGE UP (ref 0–225)
CK SERPL-CCNC: 156 U/L — SIGNIFICANT CHANGE UP (ref 0–225)
CO2 SERPL-SCNC: 24 MMOL/L — SIGNIFICANT CHANGE UP (ref 17–32)
CO2 SERPL-SCNC: 27 MMOL/L — SIGNIFICANT CHANGE UP (ref 17–32)
CREAT SERPL-MCNC: 0.7 MG/DL — SIGNIFICANT CHANGE UP (ref 0.7–1.5)
CREAT SERPL-MCNC: 1 MG/DL — SIGNIFICANT CHANGE UP (ref 0.7–1.5)
EOSINOPHIL # BLD AUTO: 0.1 K/UL — SIGNIFICANT CHANGE UP (ref 0–0.7)
EOSINOPHIL NFR BLD AUTO: 1.7 % — SIGNIFICANT CHANGE UP (ref 0–8)
ESTIMATED AVERAGE GLUCOSE: 174 MG/DL — HIGH (ref 68–114)
FOLATE SERPL-MCNC: 11.9 NG/ML — SIGNIFICANT CHANGE UP
GLUCOSE BLDC GLUCOMTR-MCNC: 117 MG/DL — HIGH (ref 70–99)
GLUCOSE BLDC GLUCOMTR-MCNC: 139 MG/DL — HIGH (ref 70–99)
GLUCOSE BLDC GLUCOMTR-MCNC: 170 MG/DL — HIGH (ref 70–99)
GLUCOSE BLDC GLUCOMTR-MCNC: 213 MG/DL — HIGH (ref 70–99)
GLUCOSE SERPL-MCNC: 141 MG/DL — HIGH (ref 70–99)
GLUCOSE SERPL-MCNC: 157 MG/DL — HIGH (ref 70–99)
HCT VFR BLD CALC: 44.3 % — SIGNIFICANT CHANGE UP (ref 37–47)
HGB BLD-MCNC: 14.3 G/DL — SIGNIFICANT CHANGE UP (ref 12–16)
IMM GRANULOCYTES NFR BLD AUTO: 0.7 % — HIGH (ref 0.1–0.3)
LYMPHOCYTES # BLD AUTO: 1.27 K/UL — SIGNIFICANT CHANGE UP (ref 1.2–3.4)
LYMPHOCYTES # BLD AUTO: 21.6 % — SIGNIFICANT CHANGE UP (ref 20.5–51.1)
MAGNESIUM SERPL-MCNC: 1.3 MG/DL — LOW (ref 1.8–2.4)
MAGNESIUM SERPL-MCNC: 1.7 MG/DL — LOW (ref 1.8–2.4)
MCHC RBC-ENTMCNC: 29.1 PG — SIGNIFICANT CHANGE UP (ref 27–31)
MCHC RBC-ENTMCNC: 32.3 G/DL — SIGNIFICANT CHANGE UP (ref 32–37)
MCV RBC AUTO: 90 FL — SIGNIFICANT CHANGE UP (ref 81–99)
MONOCYTES # BLD AUTO: 0.44 K/UL — SIGNIFICANT CHANGE UP (ref 0.1–0.6)
MONOCYTES NFR BLD AUTO: 7.5 % — SIGNIFICANT CHANGE UP (ref 1.7–9.3)
NEUTROPHILS # BLD AUTO: 3.95 K/UL — SIGNIFICANT CHANGE UP (ref 1.4–6.5)
NEUTROPHILS NFR BLD AUTO: 67.1 % — SIGNIFICANT CHANGE UP (ref 42.2–75.2)
NRBC # BLD: 0 /100 WBCS — SIGNIFICANT CHANGE UP (ref 0–0)
PLATELET # BLD AUTO: 261 K/UL — SIGNIFICANT CHANGE UP (ref 130–400)
POTASSIUM SERPL-MCNC: 3.3 MMOL/L — LOW (ref 3.5–5)
POTASSIUM SERPL-MCNC: 5 MMOL/L — SIGNIFICANT CHANGE UP (ref 3.5–5)
POTASSIUM SERPL-SCNC: 3.3 MMOL/L — LOW (ref 3.5–5)
POTASSIUM SERPL-SCNC: 5 MMOL/L — SIGNIFICANT CHANGE UP (ref 3.5–5)
PROT SERPL-MCNC: 6.3 G/DL — SIGNIFICANT CHANGE UP (ref 6–8)
PROT SERPL-MCNC: 6.5 G/DL — SIGNIFICANT CHANGE UP (ref 6–8)
RBC # BLD: 4.92 M/UL — SIGNIFICANT CHANGE UP (ref 4.2–5.4)
RBC # FLD: 16.2 % — HIGH (ref 11.5–14.5)
SODIUM SERPL-SCNC: 138 MMOL/L — SIGNIFICANT CHANGE UP (ref 135–146)
SODIUM SERPL-SCNC: 141 MMOL/L — SIGNIFICANT CHANGE UP (ref 135–146)
TROPONIN T SERPL-MCNC: 0.38 NG/ML — CRITICAL HIGH
TROPONIN T SERPL-MCNC: 0.4 NG/ML — CRITICAL HIGH
TROPONIN T SERPL-MCNC: 0.41 NG/ML — CRITICAL HIGH
TSH SERPL-MCNC: 6.12 UIU/ML — HIGH (ref 0.27–4.2)
VIT B12 SERPL-MCNC: 876 PG/ML — SIGNIFICANT CHANGE UP (ref 232–1245)
WBC # BLD: 5.88 K/UL — SIGNIFICANT CHANGE UP (ref 4.8–10.8)
WBC # FLD AUTO: 5.88 K/UL — SIGNIFICANT CHANGE UP (ref 4.8–10.8)

## 2021-11-11 PROCEDURE — 99233 SBSQ HOSP IP/OBS HIGH 50: CPT

## 2021-11-11 PROCEDURE — 99232 SBSQ HOSP IP/OBS MODERATE 35: CPT

## 2021-11-11 RX ORDER — DEXTROSE 50 % IN WATER 50 %
25 SYRINGE (ML) INTRAVENOUS ONCE
Refills: 0 | Status: DISCONTINUED | OUTPATIENT
Start: 2021-11-11 | End: 2021-11-18

## 2021-11-11 RX ORDER — SODIUM CHLORIDE 5 G/100ML
500 INJECTION, SOLUTION INTRAVENOUS
Refills: 0 | Status: DISCONTINUED | OUTPATIENT
Start: 2021-11-12 | End: 2021-11-13

## 2021-11-11 RX ORDER — SODIUM CHLORIDE 5 G/100ML
500 INJECTION, SOLUTION INTRAVENOUS
Refills: 0 | Status: DISCONTINUED | OUTPATIENT
Start: 2021-11-11 | End: 2021-11-13

## 2021-11-11 RX ORDER — HEPARIN SODIUM 5000 [USP'U]/ML
INJECTION INTRAVENOUS; SUBCUTANEOUS
Qty: 25000 | Refills: 0 | Status: DISCONTINUED | OUTPATIENT
Start: 2021-11-11 | End: 2021-11-13

## 2021-11-11 RX ORDER — SODIUM CHLORIDE 9 MG/ML
1000 INJECTION, SOLUTION INTRAVENOUS
Refills: 0 | Status: DISCONTINUED | OUTPATIENT
Start: 2021-11-11 | End: 2021-11-18

## 2021-11-11 RX ORDER — BUMETANIDE 0.25 MG/ML
1 INJECTION INTRAMUSCULAR; INTRAVENOUS
Qty: 20 | Refills: 0 | Status: DISCONTINUED | OUTPATIENT
Start: 2021-11-11 | End: 2021-11-15

## 2021-11-11 RX ORDER — POTASSIUM CHLORIDE 20 MEQ
40 PACKET (EA) ORAL
Refills: 0 | Status: COMPLETED | OUTPATIENT
Start: 2021-11-11 | End: 2021-11-11

## 2021-11-11 RX ORDER — INSULIN LISPRO 100/ML
VIAL (ML) SUBCUTANEOUS
Refills: 0 | Status: DISCONTINUED | OUTPATIENT
Start: 2021-11-11 | End: 2021-11-18

## 2021-11-11 RX ORDER — MAGNESIUM SULFATE 500 MG/ML
2 VIAL (ML) INJECTION ONCE
Refills: 0 | Status: DISCONTINUED | OUTPATIENT
Start: 2021-11-11 | End: 2021-11-13

## 2021-11-11 RX ORDER — GLUCAGON INJECTION, SOLUTION 0.5 MG/.1ML
1 INJECTION, SOLUTION SUBCUTANEOUS ONCE
Refills: 0 | Status: DISCONTINUED | OUTPATIENT
Start: 2021-11-11 | End: 2021-11-18

## 2021-11-11 RX ORDER — LANOLIN ALCOHOL/MO/W.PET/CERES
5 CREAM (GRAM) TOPICAL ONCE
Refills: 0 | Status: COMPLETED | OUTPATIENT
Start: 2021-11-11 | End: 2021-11-11

## 2021-11-11 RX ORDER — MAGNESIUM SULFATE 500 MG/ML
2 VIAL (ML) INJECTION ONCE
Refills: 0 | Status: COMPLETED | OUTPATIENT
Start: 2021-11-11 | End: 2021-11-11

## 2021-11-11 RX ORDER — DEXTROSE 50 % IN WATER 50 %
12.5 SYRINGE (ML) INTRAVENOUS ONCE
Refills: 0 | Status: DISCONTINUED | OUTPATIENT
Start: 2021-11-11 | End: 2021-11-18

## 2021-11-11 RX ORDER — HEPARIN SODIUM 5000 [USP'U]/ML
6000 INJECTION INTRAVENOUS; SUBCUTANEOUS EVERY 6 HOURS
Refills: 0 | Status: DISCONTINUED | OUTPATIENT
Start: 2021-11-11 | End: 2021-11-12

## 2021-11-11 RX ORDER — DEXTROSE 50 % IN WATER 50 %
15 SYRINGE (ML) INTRAVENOUS ONCE
Refills: 0 | Status: DISCONTINUED | OUTPATIENT
Start: 2021-11-11 | End: 2021-11-18

## 2021-11-11 RX ADMIN — SODIUM CHLORIDE 150 MILLILITER(S): 5 INJECTION, SOLUTION INTRAVENOUS at 08:37

## 2021-11-11 RX ADMIN — BUMETANIDE 5 MG/HR: 0.25 INJECTION INTRAMUSCULAR; INTRAVENOUS at 08:38

## 2021-11-11 RX ADMIN — Medication 81 MILLIGRAM(S): at 11:17

## 2021-11-11 RX ADMIN — Medication 2 GRAM(S): at 17:35

## 2021-11-11 RX ADMIN — CHLORHEXIDINE GLUCONATE 1 APPLICATION(S): 213 SOLUTION TOPICAL at 05:37

## 2021-11-11 RX ADMIN — BUDESONIDE AND FORMOTEROL FUMARATE DIHYDRATE 2 PUFF(S): 160; 4.5 AEROSOL RESPIRATORY (INHALATION) at 08:37

## 2021-11-11 RX ADMIN — ATORVASTATIN CALCIUM 80 MILLIGRAM(S): 80 TABLET, FILM COATED ORAL at 22:21

## 2021-11-11 RX ADMIN — MAGNESIUM OXIDE 400 MG ORAL TABLET 400 MILLIGRAM(S): 241.3 TABLET ORAL at 11:38

## 2021-11-11 RX ADMIN — Medication 40 MILLIEQUIVALENT(S): at 17:36

## 2021-11-11 RX ADMIN — Medication 40 MILLIEQUIVALENT(S): at 15:37

## 2021-11-11 RX ADMIN — Medication 50 MILLIGRAM(S): at 05:36

## 2021-11-11 RX ADMIN — MAGNESIUM OXIDE 400 MG ORAL TABLET 400 MILLIGRAM(S): 241.3 TABLET ORAL at 08:38

## 2021-11-11 RX ADMIN — SACUBITRIL AND VALSARTAN 1 TABLET(S): 24; 26 TABLET, FILM COATED ORAL at 05:34

## 2021-11-11 RX ADMIN — Medication 2 GRAM(S): at 05:33

## 2021-11-11 RX ADMIN — MAGNESIUM OXIDE 400 MG ORAL TABLET 400 MILLIGRAM(S): 241.3 TABLET ORAL at 17:36

## 2021-11-11 RX ADMIN — PANTOPRAZOLE SODIUM 40 MILLIGRAM(S): 20 TABLET, DELAYED RELEASE ORAL at 05:35

## 2021-11-11 RX ADMIN — SODIUM CHLORIDE 150 MILLILITER(S): 5 INJECTION, SOLUTION INTRAVENOUS at 18:01

## 2021-11-11 RX ADMIN — Medication 16.67 GRAM(S): at 15:37

## 2021-11-11 RX ADMIN — SACUBITRIL AND VALSARTAN 1 TABLET(S): 24; 26 TABLET, FILM COATED ORAL at 17:35

## 2021-11-11 RX ADMIN — Medication 5 MILLIGRAM(S): at 22:45

## 2021-11-11 RX ADMIN — CLOPIDOGREL BISULFATE 75 MILLIGRAM(S): 75 TABLET, FILM COATED ORAL at 11:17

## 2021-11-11 RX ADMIN — Medication 145 MILLIGRAM(S): at 11:16

## 2021-11-11 RX ADMIN — HEPARIN SODIUM 1000 UNIT(S)/HR: 5000 INJECTION INTRAVENOUS; SUBCUTANEOUS at 03:26

## 2021-11-11 RX ADMIN — Medication 40 MILLIEQUIVALENT(S): at 08:39

## 2021-11-11 NOTE — PROGRESS NOTE ADULT - ASSESSMENT
56 year old female with a pmhx of chronic hypoxic respiratory failure (on 4L home O2) secondary to ( HFrEF 35-40%, GIII DD)  with pulmonary hypertension, HTN, HLD, DM2, active smoker , CVA with residual LE weakness (2014, wheelchair bound),  COPD , mood /depression,  presents of worsening dyspnea, orthopnea, weight gain concerning for acute hypoxic respiratory failure 2/2 to acute CHF exacerbation.     #Acute Hypoxic Respiratory Failure 2/2 HFmEF exacerbation - no sepsis poa - still volume overloaded  #H/o HTN  - p/w dyspnea, orthopnea, ~25lb recent weight gain; PE w  3+ pitting edema, decr bs and crackles; BNP 7542   - CXR w increased b/l opacities and cardiomegaly; CTA neg for PE w findings c/w interstitial pulmonary edema w associated small bilateral pleural effusions  - last echo (6/14/21) w EF35-40% w G3DD & pulm htn; on torsemide at home  - HF team consult appreciated  - cont bumex gtt + hypertonic saline, along w GDMT (entresto, bb)  - f/u repeat echo  - monitor daily cxr & bmp bid - maintain k >4, mg >2.1  - strict I&Os, daily weights    #Acute NSTEMI   #H/o CAD s/p PCI (6/2021)  - Trop 02>.05>.06>.28>.41; CKMB 24.1  - started on Heparin gtt (acs protocol) - monitor ptts  - c/w dapt (in light of recent stent), statin, bb  - pain control prn  - cardio following - will cath when pt clinically euvolemic    #Tachycardia w h/o recurrent SVT - likely atrial tachycardia  - EKG showing sinus tachy on admission; d-dimer 800  - duplex neg for dvt; cta neg for pe  - seen by EP during admission in june (was started on amiodarone but reportedly never picked up medication after d/c)  - cont bb, holding off on amio for now - monitor on tele - possible reconsult EP   - on heparin gtt    #COPD on 4L home O2  #CORNELIUS on CPAP  - currently saturating well on NC  - cont inhalers and nebs prn  - will inquire about bringing home CPAP to use qHS    #H/o CVA w residual LE weakness  #H/o HLD  - f/u lipid panel  - cont fenofibrate and lipitor, along w dapt      #DM type 2 (last A1c 06/2021 6.8)  - monitor FS - add basal bolus insulin if needed    #H/o Mood Disorder  - per allscripts, pt alon takes seroquel and was previously prescribed depakote and ativan    #Morbid Obesity (BMI 45.8)  #Active Smoker  - counseled smoking cessation, diet and exercise  - outpatient nutrition follow up    #Vitamin D Deficiency   - cont vit D 50,000 Q weekly     DVT ppx: heparin gtt  GI ppx: ppi   Diet: DASH   Activity: IAT - uses wheelchair     56 year old female with a pmhx of chronic hypoxic respiratory failure (on 4L home O2) secondary to ( HFrEF 35-40%, GIII DD)  with pulmonary hypertension, HTN, HLD, DM2, active smoker , CVA with residual LE weakness (2014, wheelchair bound),  COPD , mood /depression,  presents of worsening dyspnea, orthopnea, weight gain concerning for acute hypoxic respiratory failure 2/2 to acute CHF exacerbation.     #Acute Hypoxic Respiratory Failure 2/2 HFmEF exacerbation - no sepsis poa - still volume overloaded  #H/o HTN  - p/w dyspnea, orthopnea, ~25lb recent weight gain; PE w  3+ pitting edema, decr bs and crackles; BNP 7542   - CXR w increased b/l opacities and cardiomegaly; CTA w findings c/w interstitial pulmonary edema w associated small bilateral pleural effusions  - last echo (6/14/21) w EF35-40% w G3DD & pulm htn; on torsemide at home  - HF team consult appreciated  - cont bumex gtt + hypertonic saline, along w GDMT (entresto, bb)  - f/u repeat echo  - monitor daily cxr & bmp bid - maintain k >4, mg >2.1  - strict I&Os, daily weights    #Acute NSTEMI   #H/o CAD s/p PCI (6/2021)  - Trop 02>.05>.06>.28>.41; CKMB 24.1  - started on Heparin gtt (acs protocol) - monitor ptts  - c/w dapt (in light of recent stent), statin, bb  - pain control prn  - cardio following - will cath when pt clinically euvolemic    #Tachycardia w h/o recurrent SVT - likely atrial tachycardia  - EKG showing sinus tachy on admission; d-dimer 800  - duplex neg for dvt; cta neg for pe  - seen by EP during admission in june (was started on amiodarone but reportedly never picked up medication after d/c)  - cont bb, holding off on amio for now - monitor on tele - possible reconsult EP   - on heparin gtt    #COPD on 4L home O2  #CORNELIUS on CPAP  - currently saturating well on NC  - cont inhalers and nebs prn  - will inquire about bringing home CPAP to use qHS    #H/o CVA w residual LE weakness  #H/o HLD  - f/u lipid panel  - cont fenofibrate and lipitor, along w dapt      #DM type 2 (last A1c 06/2021 6.8)  - monitor FS - add basal bolus insulin if needed    #H/o Mood Disorder  - per allscripts, pt alon takes seroquel and was previously prescribed depakote and ativan    #Morbid Obesity (BMI 45.8)  #Active Smoker  - counseled smoking cessation, diet and exercise  - outpatient nutrition follow up    #Vitamin D Deficiency   - cont vit D 50,000 Q weekly     DVT ppx: heparin gtt  GI ppx: ppi   Diet: DASH   Activity: IAT - uses wheelchair    Full code

## 2021-11-11 NOTE — PROGRESS NOTE ADULT - ASSESSMENT
A 56 year old female with a PMH of chronic hypoxic respiratory failure (on 4L home O2) secondary to ( HFrEF 35-40%, GIII DD)  with pulmonary hypertension, HTN, HLD, DM2, active smoker , CVA with residual LE weakness (2014, wheelchair bound),  COPD , mood /depression,  presents of worsening shortness of breath and increase weight with  B/L LE swelling over past week.    Acute on chronic HFrEF  DM-2 / HTN / DL  Chronic hypoxic respiratory failure  Pulmonary HTN  Tobacco use  H/O CVA with residual LE weakness  COPD  Mood disorder / Depression  NSTEMI                PLAN:    ·	CE trending up. Repeat CE  ·	Cardiology f/u noted. Cont ASA, Plavix, Lipitor, Metoprolol   ·	Plan for cath when euvolemic.   ·	Care D/W the HF specialist. Cont Bumex infusion and hypertonic saline as per HF specialist recommendation.   ·	A negative balance of 2710 over the last 24 hrs  ·	Check i's and o's and daily wt  ·	Low salt diet and water restriction to 1.5 L/D  ·	Had cath done in June 2021 and RICARDO placed in mid RCA  ·	GDMT. On Metoprolol and Entresto  ·	Cont her other meds    Progress Note Handoff    Pending (specify):  Consults_________, Tests________, Test Results_______, Other___Fluid overload, NSTEMI_____  Family discussion:  Disposition: Home___/SNF___/Other________/Unknown at this time________    Stanley Domingo MD  Spectra: [phone]

## 2021-11-11 NOTE — PROGRESS NOTE ADULT - SUBJECTIVE AND OBJECTIVE BOX
WILLIAN MARY  56y Female    CHIEF COMPLAINT:    Patient is a 56y old  Female who presents with a chief complaint of chf / hf (2021 08:04)      INTERVAL HPI/OVERNIGHT EVENTS:    Patient seen and examined. Breathing has improved. No cp. C/O pain in neck area. No palpitations. On IV diuretics. Fluid overload    ROS: All other systems are negative.    Vital Signs:    T(F): 97 (21 @ 04:20), Max: 97.6 (11-10-21 @ 20:55)  HR: 77 (21 @ 04:20) (77 - 82)  BP: 124/77 (21 @ 04:20) (123/75 - 131/88)  RR: 22 (21 @ 07:35) (17 - 22)  SpO2: 96% (21 @ 07:35) (96% - 96%)  I&O's Summary    10 Nov 2021 07:01  -  2021 07:00  --------------------------------------------------------  IN: 990 mL / OUT: 3700 mL / NET: -2710 mL    2021 07:01  -  2021 12:06  --------------------------------------------------------  IN: 435 mL / OUT: 1100 mL / NET: -665 mL      Daily     Daily Weight in k.5 (2021 04:20)  CAPILLARY BLOOD GLUCOSE      POCT Blood Glucose.: 213 mg/dL (2021 11:18)  POCT Blood Glucose.: 139 mg/dL (2021 07:47)  POCT Blood Glucose.: 173 mg/dL (10 Nov 2021 21:50)  POCT Blood Glucose.: 159 mg/dL (10 Nov 2021 17:06)      PHYSICAL EXAM:    GENERAL:  NAD  SKIN: No rashes or lesions  HENT: Atraumatic. Normocephalic. PERRL. Moist membranes.  NECK: Supple, No JVD. No lymphadenopathy.  PULMONARY: +ve rales and decreased BS in the bases B/L. No wheezing. No rales  CVS: Normal S1, S2. Rate and Rhythm are regular. No murmurs.  ABDOMEN/GI: Soft, Nontender, Nondistended; BS present  EXTREMITIES: Peripheral pulses intact. 2+ pitting edema B/L LE.  NEUROLOGIC:  No motor or sensory deficit.  PSYCH: Alert & oriented x 3    Consultant(s) Notes Reviewed:  [x ] YES  [ ] NO  Care Discussed with Consultants/Other Providers [ x] YES  [ ] NO    EKG reviewed  Telemetry reviewed    LABS:                        14.3   5.88  )-----------( 261      ( 2021 04:30 )             44.3         141  |  95<L>  |  14  ----------------------------<  141<H>  3.3<L>   |  27  |  0.7    Ca    8.2<L>      2021 04:30  Mg     1.3         TPro  6.3  /  Alb  3.3<L>  /  TBili  1.3<H>  /  DBili  x   /  AST  41  /  ALT  10  /  AlkPhos  77      PTT - ( 2021 04:30 )  PTT:60.2 sec  Serum Pro-Brain Natriuretic Peptide: 7542 pg/mL (21 @ 11:27)    Trop 0.41, CKMB --, CK --, 21 @ 04:30  Trop 0.28, CKMB 24.1, , 11-10-21 @ 18:31  Trop 0.06, CKMB --, CK --, 21 @ 22:51  Trop 0.05, CKMB --, CK --, 21 @ 16:45        RADIOLOGY & ADDITIONAL TESTS:      Imaging or report Personally Reviewed:  [ ] YES  [ ] NO    Medications:  Standing  aspirin  chewable 81 milliGRAM(s) Oral daily  atorvastatin 80 milliGRAM(s) Oral at bedtime  budesonide 160 MICROgram(s)/formoterol 4.5 MICROgram(s) Inhaler 2 Puff(s) Inhalation two times a day  buMETAnide Infusion 1 mG/Hr IV Continuous <Continuous>  chlorhexidine 4% Liquid 1 Application(s) Topical <User Schedule>  clopidogrel Tablet 75 milliGRAM(s) Oral daily  fenofibrate Tablet 145 milliGRAM(s) Oral daily  heparin  Infusion.  Unit(s)/Hr IV Continuous <Continuous>  magnesium oxide 400 milliGRAM(s) Oral three times a day with meals  metoprolol succinate ER 50 milliGRAM(s) Oral daily  omega-3-Acid Ethyl Esters 2 Gram(s) Oral two times a day  pantoprazole    Tablet 40 milliGRAM(s) Oral before breakfast  sacubitril 49 mG/valsartan 51 mG 1 Tablet(s) Oral two times a day  sodium chloride 3%. 500 milliLiter(s) IV Continuous <Continuous>  sodium chloride 3%. 500 milliLiter(s) IV Continuous <Continuous>    PRN Meds  ALBUTerol    90 MICROgram(s) HFA Inhaler 2 Puff(s) Inhalation every 6 hours PRN  heparin   Injectable 6000 Unit(s) IV Push every 6 hours PRN      Case discussed with resident    Care discussed with pt/family

## 2021-11-11 NOTE — PROGRESS NOTE ADULT - ASSESSMENT
Acute on chronic systolic HF /Fluid overload /pulmonary HTN    Patient remains fluid overloaded on exam  Continue Bumex gtt at 1 mg/hr   Continue 3% Hypertonic Saline 150ml BID  BMP twice daily   Maintain potassium >4.0, Mg >2.1  Strict intake and output  Daily weight   Plan discussed with primary team  Will continue to follow

## 2021-11-11 NOTE — PROGRESS NOTE ADULT - SUBJECTIVE AND OBJECTIVE BOX
CHIEF COMPLAINT:  Patient is a 56y old  Female who presents with a chief complaint of SOB (10 Nov 2021 14:31)      INTERVAL HISTORY/OVERNIGHT EVENTS:  ·	pt had troponin rise overnight from .06 >> .28  ·	on exam, pt still comfortable without any cp,  sob that's improving with iv diuresis.   ·	did report to us yesterday - recalling that she wasn't on dapt (possible medication changes by her pmd)  -   ·	reloaded asa/plavix and started on hep gtt overnight     ======================  MEDICATIONS:  aspirin  chewable 81 milliGRAM(s) Oral daily  atorvastatin 80 milliGRAM(s) Oral at bedtime  budesonide 160 MICROgram(s)/formoterol 4.5 MICROgram(s) Inhaler 2 Puff(s) Inhalation two times a day  chlorhexidine 4% Liquid 1 Application(s) Topical <User Schedule>  clopidogrel Tablet 75 milliGRAM(s) Oral daily  fenofibrate Tablet 145 milliGRAM(s) Oral daily  magnesium oxide 400 milliGRAM(s) Oral three times a day with meals  metoprolol succinate ER 50 milliGRAM(s) Oral daily  omega-3-Acid Ethyl Esters 2 Gram(s) Oral two times a day  pantoprazole    Tablet 40 milliGRAM(s) Oral before breakfast  potassium chloride   Powder 40 milliEquivalent(s) Oral once  sacubitril 49 mG/valsartan 51 mG 1 Tablet(s) Oral two times a day    DRIPS:  heparin  Infusion.  Unit(s)/Hr (10 mL/Hr) IV Continuous <Continuous>  sodium chloride 3%. 500 milliLiter(s) (150 mL/Hr) IV Continuous <Continuous>  sodium chloride 3%. 500 milliLiter(s) (150 mL/Hr) IV Continuous <Continuous>    PRN:       ======================  PHYSICAL EXAMINATION:  GEN:  nad.   HEENT:  eomi. ncat  PULM:  b/l lung sounds.  b/l crackles.   CARD: s1, s2  ABD: +bs. ntnd  EXT:  no new rashes.  still with bl le edema.   NEURO:  no new focal deficits.   ======================  OBJECTIVE:        VS:  T(F): 97 (11-11 @ 04:20), Max: 97.8 (11-10 @ 10:10)  HR: 77 (11-11 @ 04:20) (77 - 100)  BP: 124/77 (11-11 @ 04:20) (123/75 - 146/102)  RR: 22 (11-11 @ 07:35) (17 - 22)  SpO2: 96% (11-11 @ 07:35) (96% - 98%)  CVP(mm Hg): --  CO: --  CI: --  PA: --  PCWP: --    I/O:      11-09 @ 07:01  -  11-10 @ 07:00  --------------------------------------------------------  IN: 1700 mL / OUT: 5900 mL / NET: -4200 mL    11-10 @ 07:01  -  11-11 @ 07:00  --------------------------------------------------------  IN: 990 mL / OUT: 3700 mL / NET: -2710 mL        Weight trend:  Weight (kg): 102.9 (11-09)    ======================    LABS:                          15.1   8.78  )-----------( 292      ( 10 Nov 2021 04:30 )             47.0     11-10    135  |  94<L>  |  15  ----------------------------<  214<H>  4.1   |  21  |  1.0    Ca    8.5      10 Nov 2021 18:31  Mg     1.3     11-10    TPro  7.0  /  Alb  3.5  /  TBili  1.2  /  DBili  x   /  AST  42<H>  /  ALT  12  /  AlkPhos  99  11-10    LIVER FUNCTIONS - ( 10 Nov 2021 18:31 )  Alb: 3.5 g/dL / Pro: 7.0 g/dL / ALK PHOS: 99 U/L / ALT: 12 U/L / AST: 42 U/L / GGT: x           PTT - ( 10 Nov 2021 23:30 )  PTT:29.8 sec  Troponin T, Serum: 0.28 ng/mL (11-10 @ 18:31)  Creatine Kinase, Serum: 230 U/L (11-10 @ 18:31)  CKMB Units: 24.1 ng/mL (11-10 @ 18:31)    CARDIAC MARKERS ( 10 Nov 2021 18:31 )  x     / 0.28 ng/mL / 230 U/L / x     / 24.1 ng/mL  CARDIAC MARKERS ( 09 Nov 2021 22:51 )  x     / 0.06 ng/mL / x     / x     / x      CARDIAC MARKERS ( 09 Nov 2021 16:45 )  x     / 0.05 ng/mL / x     / x     / x          Serum Pro-Brain Natriuretic Peptide: 7542 pg/mL (11-08)        Cultures:         CHIEF COMPLAINT:    Patient is a 56y old  Female who presents with a chief complaint of SOB (10 Nov 2021 14:31)      INTERVAL HISTORY/OVERNIGHT EVENTS:  ·	pt had troponin rise overnight from .06 >> .28  ·	on exam, pt still comfortable without any cp,  sob that's improving with iv diuresis.   ·	did report to us yesterday - recalling that she wasn't on dapt (possible medication changes by her pmd)  -   ·	reloaded asa/plavix and started on hep gtt overnight     ======================  MEDICATIONS:  aspirin  chewable 81 milliGRAM(s) Oral daily  atorvastatin 80 milliGRAM(s) Oral at bedtime  budesonide 160 MICROgram(s)/formoterol 4.5 MICROgram(s) Inhaler 2 Puff(s) Inhalation two times a day  chlorhexidine 4% Liquid 1 Application(s) Topical <User Schedule>  clopidogrel Tablet 75 milliGRAM(s) Oral daily  fenofibrate Tablet 145 milliGRAM(s) Oral daily  magnesium oxide 400 milliGRAM(s) Oral three times a day with meals  metoprolol succinate ER 50 milliGRAM(s) Oral daily  omega-3-Acid Ethyl Esters 2 Gram(s) Oral two times a day  pantoprazole    Tablet 40 milliGRAM(s) Oral before breakfast  potassium chloride   Powder 40 milliEquivalent(s) Oral once  sacubitril 49 mG/valsartan 51 mG 1 Tablet(s) Oral two times a day    DRIPS:  heparin  Infusion.  Unit(s)/Hr (10 mL/Hr) IV Continuous <Continuous>  sodium chloride 3%. 500 milliLiter(s) (150 mL/Hr) IV Continuous <Continuous>  sodium chloride 3%. 500 milliLiter(s) (150 mL/Hr) IV Continuous <Continuous>    PRN:       ======================  PHYSICAL EXAMINATION:  GEN:  nad.   HEENT:  eomi. ncat  PULM:  b/l lung sounds.  b/l crackles.   CARD: s1, s2  ABD: +bs. ntnd  EXT:  no new rashes.  still with bl le edema.   NEURO:  no new focal deficits.   ======================  OBJECTIVE:        VS:  T(F): 97 (11-11 @ 04:20), Max: 97.8 (11-10 @ 10:10)  HR: 77 (11-11 @ 04:20) (77 - 100)  BP: 124/77 (11-11 @ 04:20) (123/75 - 146/102)  RR: 22 (11-11 @ 07:35) (17 - 22)  SpO2: 96% (11-11 @ 07:35) (96% - 98%)  CVP(mm Hg): --  CO: --  CI: --  PA: --  PCWP: --    I/O:      11-09 @ 07:01  -  11-10 @ 07:00  --------------------------------------------------------  IN: 1700 mL / OUT: 5900 mL / NET: -4200 mL    11-10 @ 07:01  -  11-11 @ 07:00  --------------------------------------------------------  IN: 990 mL / OUT: 3700 mL / NET: -2710 mL        Weight trend:  Weight (kg): 102.9 (11-09)    ======================    LABS:                          15.1   8.78  )-----------( 292      ( 10 Nov 2021 04:30 )             47.0     11-10    135  |  94<L>  |  15  ----------------------------<  214<H>  4.1   |  21  |  1.0    Ca    8.5      10 Nov 2021 18:31  Mg     1.3     11-10    TPro  7.0  /  Alb  3.5  /  TBili  1.2  /  DBili  x   /  AST  42<H>  /  ALT  12  /  AlkPhos  99  11-10    LIVER FUNCTIONS - ( 10 Nov 2021 18:31 )  Alb: 3.5 g/dL / Pro: 7.0 g/dL / ALK PHOS: 99 U/L / ALT: 12 U/L / AST: 42 U/L / GGT: x           PTT - ( 10 Nov 2021 23:30 )  PTT:29.8 sec  Troponin T, Serum: 0.28 ng/mL (11-10 @ 18:31)  Creatine Kinase, Serum: 230 U/L (11-10 @ 18:31)  CKMB Units: 24.1 ng/mL (11-10 @ 18:31)    CARDIAC MARKERS ( 10 Nov 2021 18:31 )  x     / 0.28 ng/mL / 230 U/L / x     / 24.1 ng/mL  CARDIAC MARKERS ( 09 Nov 2021 22:51 )  x     / 0.06 ng/mL / x     / x     / x      CARDIAC MARKERS ( 09 Nov 2021 16:45 )  x     / 0.05 ng/mL / x     / x     / x          Serum Pro-Brain Natriuretic Peptide: 7542 pg/mL (11-08)        Cultures:

## 2021-11-11 NOTE — PROGRESS NOTE ADULT - ASSESSMENT
56 year old female with a pmhx of chronic hypoxic respiratory failure (on 4L home O2) secondary to ( HFrEF 35-40%, GIII DD)  with pulmonary hypertension, HTN, HLD, DM2, active smoker , CVA with residual LE weakness (2014, wheelchair bound),  COPD , mood /depression,  presents of worsening shortness of breath and increase weight found to be in HF exacerbation, cardiology consulted for elevated troponin.     IMPRESSION  ·	Mild troponemia, likely demand ischemia from decompensated HF.   ·	HFREF - decompensated but some improvement with diuresis. not at baseline yet.   ·	CAD sp recent stent RCA 6/’21.   ·	Recurrent SVT-likely atrial tachycardia - prev noted to need ablation in past; amiodarone, bb + cardizem.   ·	Other hx:  HTN, HLD, DM2, active smoker , CVA with residual LE weakness (2014, wheelchair bound),  COPD on 4L o2 at home , mood /depression  ----  Echo:	6/’21:  EF 35-40.  G3dd. mod red rvsf. Mild mod mr. mod sev tr. mild ai. Mod ph (pasp 50/ra 15).   Cath:	6/’21: (angina/abn stress) lad/dg mild. Cx li. Rca m90.  // mid rca zaki x1.   ----  Labs:	trop .02 >. .05 >> .06        PLAN  - continue asa, plavix (reloaded overnight)   - continue hep gtt (acs protocol) and check PTTs.   - continue iv diuresis until more euvolemic.  hf actively following.   - C/w metoprolol, Aspirin, Plavix, Entresto, increase b-blocker as tolerated  - tte pending  - once more euvolemic, likely plan to repeat cardiac cath.     will follow 56 year old female with a pmhx of chronic hypoxic respiratory failure (on 4L home O2) secondary to ( HFrEF 35-40%, GIII DD)  with pulmonary hypertension, HTN, HLD, DM2, active smoker , CVA with residual LE weakness (2014, wheelchair bound),  COPD , mood /depression,  presents of worsening shortness of breath and increase weight found to be in HF exacerbation, cardiology consulted for elevated troponin.     IMPRESSION  ·	Mild troponemia, likely demand ischemia from decompensated HF.   ·	HFREF - decompensated but some improvement with diuresis. not at baseline yet.   ·	CAD sp recent stent RCA 6/’21.   ·	Recurrent SVT-likely atrial tachycardia - prev noted to need ablation in past; amiodarone, bb + cardizem.   ·	Other hx:  HTN, HLD, DM2, active smoker , CVA with residual LE weakness (2014, wheelchair bound),  COPD on 4L o2 at home , mood /depression  ----  Echo:	6/’21:  EF 35-40.  G3dd. mod red rvsf. Mild mod mr. mod sev tr. mild ai. Mod ph (pasp 50/ra 15).   Cath:	6/’21: (angina/abn stress) lad/dg mild. Cx li. Rca m90.  // mid rca zaki x1.   ----  Labs:	trop .02 >. .05 >> .06  >> .28        PLAN  - continue asa, plavix (reloaded overnight)   - continue hep gtt (acs protocol) and check PTTs.   - continue iv diuresis until more euvolemic.  hf actively following.   - C/w metoprolol, Aspirin, Plavix, Entresto, increase b-blocker as tolerated  - tte pending  - once more euvolemic, likely plan to repeat cardiac cath.     will follow 56 year old female with a pmhx of chronic hypoxic respiratory failure (on 4L home O2) secondary to ( HFrEF 35-40%, GIII DD)  with pulmonary hypertension, HTN, HLD, DM2, active smoker , CVA with residual LE weakness (2014, wheelchair bound),  COPD , mood /depression,  presents of worsening shortness of breath and increase weight found to be in HF exacerbation, cardiology consulted for elevated troponin.     IMPRESSION:    ·	Mild troponemia, likely demand ischemia from decompensated HF.   ·	HFREF - decompensated but some improvement with diuresis. not at baseline yet.   ·	CAD sp recent stent RCA 6/’21.   ·	Recurrent SVT-likely atrial tachycardia - prev noted to need ablation in past; amiodarone, bb + cardizem.   ·	Other hx:  HTN, HLD, DM2, active smoker , CVA with residual LE weakness (2014, wheelchair bound),  COPD on 4L o2 at home , mood /depression  ----  Echo:	6/’21:  EF 35-40.  G3dd. mod red rvsf. Mild mod mr. mod sev tr. mild ai. Mod ph (pasp 50/ra 15).   Cath:	6/’21: (angina/abn stress) lad/dg mild. Cx li. Rca m90.  // mid rca zaki x1.   ----  Labs:	trop .02 >. .05 >> .06  >> .28        PLAN  - continue asa, plavix (reloaded overnight)   - continue hep gtt (acs protocol) and check PTTs.   - continue iv diuresis until more euvolemic.  hf actively following.   - C/w metoprolol, Aspirin, Plavix, Entresto, increase b-blocker as tolerated  - tte pending  - once more euvolemic, likely plan to repeat cardiac cath.     will follow 56 year old female with a pmhx of chronic hypoxic respiratory failure (on 4L home O2) secondary to ( HFrEF 35-40%, GIII DD)  with pulmonary hypertension, HTN, HLD, DM2, active smoker , CVA with residual LE weakness (2014, wheelchair bound),  COPD , mood /depression,  presents of worsening shortness of breath and increase weight found to be in HF exacerbation, cardiology consulted for elevated troponin.     IMPRESSION:    ·	NSTEMI  ·	HFREF - decompensated but some improvement with diuresis. not at baseline yet.   ·	CAD sp recent stent RCA 6/’21.   ·	Recurrent SVT-likely atrial tachycardia - prev noted to need ablation in past; amiodarone, bb + cardizem.   ·	Other hx:  HTN, HLD, DM2, active smoker , CVA with residual LE weakness (2014, wheelchair bound),  COPD on 4L o2 at home , mood /depression  ----  Echo:	6/’21:  EF 35-40.  G3dd. mod red rvsf. Mild mod mr. mod sev tr. mild ai. Mod ph (pasp 50/ra 15).   Cath:	6/’21: (angina/abn stress) lad/dg mild. Cx li. Rca m90.  // mid rca zaki x1.   ----  Labs:	trop .02 >. .05 >> .06  >> .28        PLAN  - continue asa, plavix (reloaded overnight)   - continue hep gtt (acs protocol) and check PTTs.   - continue iv diuresis until more euvolemic.  hf actively following.   - C/w metoprolol, Aspirin, Plavix, Entresto, increase b-blocker as tolerated  - tte pending  - once more euvolemic, likely plan to repeat cardiac cath.     will follow

## 2021-11-11 NOTE — PROGRESS NOTE ADULT - SUBJECTIVE AND OBJECTIVE BOX
SUBJECTIVE:    Patient is a 56y old Female who presents with a chief complaint of worsening dyspnea, orthopnea and weight gain.    Currently admitted to medicine with the primary diagnosis of CHF exacerbation    24 hour update:  Today is hospital day 3d. Patient complaining of some chest discomfort overnight/this AM. patient was found to have further rise in Trop along w CKMB. Patient however still volume overloaded. Cardio on board - started on heparin gtt, will take for cath when euvolemic.      PAST MEDICAL & SURGICAL HISTORY  Hypertension    Hyperlipidemia    Anxiety and depression    COPD, severe    CHF (congestive heart failure)    Cerebrovascular accident (CVA)  Multiple    Type 2 diabetes mellitus    CKD (chronic kidney disease), stage II    No significant past surgical history      SOCIAL HISTORY:    ALLERGIES:  No Known Allergies    MEDICATIONS:  STANDING MEDICATIONS  aspirin  chewable 81 milliGRAM(s) Oral daily  atorvastatin 80 milliGRAM(s) Oral at bedtime  budesonide 160 MICROgram(s)/formoterol 4.5 MICROgram(s) Inhaler 2 Puff(s) Inhalation two times a day  buMETAnide Infusion 1 mG/Hr IV Continuous <Continuous>  chlorhexidine 4% Liquid 1 Application(s) Topical <User Schedule>  clopidogrel Tablet 75 milliGRAM(s) Oral daily  enoxaparin Injectable 40 milliGRAM(s) SubCutaneous daily  fenofibrate Tablet 145 milliGRAM(s) Oral daily  metoprolol succinate ER 50 milliGRAM(s) Oral daily  omega-3-Acid Ethyl Esters 2 Gram(s) Oral two times a day  pantoprazole    Tablet 40 milliGRAM(s) Oral before breakfast  sacubitril 49 mG/valsartan 51 mG 1 Tablet(s) Oral two times a day  sodium chloride 3%. 150 milliLiter(s) IV Continuous <Continuous>    PRN MEDICATIONS  ALBUTerol    90 MICROgram(s) HFA Inhaler 2 Puff(s) Inhalation every 6 hours PRN    VITALS:   T(C): 36.2 (11 Nov 2021 13:24), Max: 36.4 (10 Nov 2021 20:55)  T(F): 97.2 (11 Nov 2021 13:24), Max: 97.6 (10 Nov 2021 20:55)  HR: 79 (11 Nov 2021 13:24) (77 - 80)  BP: 151/70 (11 Nov 2021 13:24) (123/75 - 151/70)  RR: 20 (11 Nov 2021 13:24) (17 - 22)  SpO2: 96% (11 Nov 2021 07:35) (96% - 96%)    PHYSICAL EXAM:  GENERAL: NAD  NERVOUS SYSTEM: AAOx3  CHEST/LUNG: decr bs b/l +crackles  HEART: +s1s2 RRR  ABDOMEN: soft, NT/ND  EXTREMITIES:  3+ pitting edema    LABS:        Labs:                        14.3   5.88  )-----------( 261      ( 11 Nov 2021 04:30 )             44.3                                   11-11    141  |  95<L>  |  14  ----------------------------<  141<H>  3.3<L>   |  27  |  0.7    Ca    8.2<L>      11 Nov 2021 04:30  Mg     1.3     11-11    TPro  6.3  /  Alb  3.3<L>  /  TBili  1.3<H>  /  DBili  x   /  AST  41  /  ALT  10  /  AlkPhos  77  11-11     PTT - ( 11 Nov 2021 11:00 )  PTT:55.1 sec    CARDIAC MARKERS ( 11 Nov 2021 11:00 )  x     / 0.38 ng/mL / 156 U/L / x     / 11.7 ng/mL  CARDIAC MARKERS ( 11 Nov 2021 04:30 )  x     / 0.41 ng/mL / x     / x     / x      CARDIAC MARKERS ( 10 Nov 2021 18:31 )  x     / 0.28 ng/mL / 230 U/L / x     / 24.1 ng/mL  CARDIAC MARKERS ( 09 Nov 2021 22:51 )  x     / 0.06 ng/mL / x     / x     / x      CARDIAC MARKERS ( 09 Nov 2021 16:45 )  x     / 0.05 ng/mL / x     / x     / x                            Serum Pro-Brain Natriuretic Peptide: 7542 pg/mL (11-08-21 @ 11:27)     Cholesterol, Serum: 89 mg/dL (11-10-21 @ 18:31)  HDL Cholesterol, Serum: 24 mg/dL (11-10-21 @ 18:31)  Triglycerides, Serum: 174 mg/dL (11-10-21 @ 18:31)    IMAGING:       SUBJECTIVE:    Patient is a 56y old Female who presents with a chief complaint of worsening dyspnea, orthopnea and weight gain.    Currently admitted to medicine with the primary diagnosis of CHF exacerbation    24 hour update:  Today is hospital day 3d. Patient complaining of some chest discomfort overnight/this AM. patient was found to have further rise in Trop along w CKMB. Patient however still volume overloaded - on bumex gtt. Cardio on board - started on heparin gtt, will take for cath when euvolemic.      PAST MEDICAL & SURGICAL HISTORY  Hypertension    Hyperlipidemia    Anxiety and depression    COPD, severe    CHF (congestive heart failure)    Cerebrovascular accident (CVA)  Multiple    Type 2 diabetes mellitus    CKD (chronic kidney disease), stage II    No significant past surgical history      SOCIAL HISTORY:    ALLERGIES:  No Known Allergies    MEDICATIONS:  STANDING MEDICATIONS  aspirin  chewable 81 milliGRAM(s) Oral daily  atorvastatin 80 milliGRAM(s) Oral at bedtime  budesonide 160 MICROgram(s)/formoterol 4.5 MICROgram(s) Inhaler 2 Puff(s) Inhalation two times a day  buMETAnide Infusion 1 mG/Hr IV Continuous <Continuous>  chlorhexidine 4% Liquid 1 Application(s) Topical <User Schedule>  clopidogrel Tablet 75 milliGRAM(s) Oral daily  enoxaparin Injectable 40 milliGRAM(s) SubCutaneous daily  fenofibrate Tablet 145 milliGRAM(s) Oral daily  metoprolol succinate ER 50 milliGRAM(s) Oral daily  omega-3-Acid Ethyl Esters 2 Gram(s) Oral two times a day  pantoprazole    Tablet 40 milliGRAM(s) Oral before breakfast  sacubitril 49 mG/valsartan 51 mG 1 Tablet(s) Oral two times a day  sodium chloride 3%. 150 milliLiter(s) IV Continuous <Continuous>    PRN MEDICATIONS  ALBUTerol    90 MICROgram(s) HFA Inhaler 2 Puff(s) Inhalation every 6 hours PRN    VITALS:   T(C): 36.2 (11 Nov 2021 13:24), Max: 36.4 (10 Nov 2021 20:55)  T(F): 97.2 (11 Nov 2021 13:24), Max: 97.6 (10 Nov 2021 20:55)  HR: 79 (11 Nov 2021 13:24) (77 - 80)  BP: 151/70 (11 Nov 2021 13:24) (123/75 - 151/70)  RR: 20 (11 Nov 2021 13:24) (17 - 22)  SpO2: 96% (11 Nov 2021 07:35) (96% - 96%)    PHYSICAL EXAM:  GENERAL: NAD  NERVOUS SYSTEM: AAOx3  CHEST/LUNG: decr bs b/l +crackles  HEART: +s1s2 RRR  ABDOMEN: soft, NT/ND  EXTREMITIES:  3+ pitting edema    LABS:        Labs:                        14.3   5.88  )-----------( 261      ( 11 Nov 2021 04:30 )             44.3                                   11-11    141  |  95<L>  |  14  ----------------------------<  141<H>  3.3<L>   |  27  |  0.7    Ca    8.2<L>      11 Nov 2021 04:30  Mg     1.3     11-11    TPro  6.3  /  Alb  3.3<L>  /  TBili  1.3<H>  /  DBili  x   /  AST  41  /  ALT  10  /  AlkPhos  77  11-11     PTT - ( 11 Nov 2021 11:00 )  PTT:55.1 sec    CARDIAC MARKERS ( 11 Nov 2021 11:00 )  x     / 0.38 ng/mL / 156 U/L / x     / 11.7 ng/mL  CARDIAC MARKERS ( 11 Nov 2021 04:30 )  x     / 0.41 ng/mL / x     / x     / x      CARDIAC MARKERS ( 10 Nov 2021 18:31 )  x     / 0.28 ng/mL / 230 U/L / x     / 24.1 ng/mL  CARDIAC MARKERS ( 09 Nov 2021 22:51 )  x     / 0.06 ng/mL / x     / x     / x      CARDIAC MARKERS ( 09 Nov 2021 16:45 )  x     / 0.05 ng/mL / x     / x     / x                            Serum Pro-Brain Natriuretic Peptide: 7542 pg/mL (11-08-21 @ 11:27)     Cholesterol, Serum: 89 mg/dL (11-10-21 @ 18:31)  HDL Cholesterol, Serum: 24 mg/dL (11-10-21 @ 18:31)  Triglycerides, Serum: 174 mg/dL (11-10-21 @ 18:31)    IMAGING:

## 2021-11-11 NOTE — CHART NOTE - NSCHARTNOTEFT_GEN_A_CORE
Patient with hx diabetes, with elevated BG; with edema and receiving bumex.  Recommend changing diet to DASH, Carb consistent, 1200mL fluid restriction.  Spoke with resident to inform of recommendation.  Will continue to follow.

## 2021-11-12 LAB
ALBUMIN SERPL ELPH-MCNC: 3.2 G/DL — LOW (ref 3.5–5.2)
ALBUMIN SERPL ELPH-MCNC: 3.4 G/DL — LOW (ref 3.5–5.2)
ALP SERPL-CCNC: 78 U/L — SIGNIFICANT CHANGE UP (ref 30–115)
ALP SERPL-CCNC: 95 U/L — SIGNIFICANT CHANGE UP (ref 30–115)
ALT FLD-CCNC: 10 U/L — SIGNIFICANT CHANGE UP (ref 0–41)
ALT FLD-CCNC: 11 U/L — SIGNIFICANT CHANGE UP (ref 0–41)
ANION GAP SERPL CALC-SCNC: 19 MMOL/L — HIGH (ref 7–14)
ANION GAP SERPL CALC-SCNC: 20 MMOL/L — HIGH (ref 7–14)
APTT BLD: 67.3 SEC — HIGH (ref 27–39.2)
APTT BLD: 75.8 SEC — CRITICAL HIGH (ref 27–39.2)
APTT BLD: 77.8 SEC — CRITICAL HIGH (ref 27–39.2)
APTT BLD: 85.7 SEC — CRITICAL HIGH (ref 27–39.2)
AST SERPL-CCNC: 29 U/L — SIGNIFICANT CHANGE UP (ref 0–41)
AST SERPL-CCNC: 30 U/L — SIGNIFICANT CHANGE UP (ref 0–41)
BASOPHILS # BLD AUTO: 0.1 K/UL — SIGNIFICANT CHANGE UP (ref 0–0.2)
BASOPHILS NFR BLD AUTO: 1.8 % — HIGH (ref 0–1)
BILIRUB SERPL-MCNC: 1.1 MG/DL — SIGNIFICANT CHANGE UP (ref 0.2–1.2)
BILIRUB SERPL-MCNC: 1.1 MG/DL — SIGNIFICANT CHANGE UP (ref 0.2–1.2)
BUN SERPL-MCNC: 16 MG/DL — SIGNIFICANT CHANGE UP (ref 10–20)
BUN SERPL-MCNC: 17 MG/DL — SIGNIFICANT CHANGE UP (ref 10–20)
CALCIUM SERPL-MCNC: 8.5 MG/DL — SIGNIFICANT CHANGE UP (ref 8.5–10.1)
CALCIUM SERPL-MCNC: 8.9 MG/DL — SIGNIFICANT CHANGE UP (ref 8.5–10.1)
CHLORIDE SERPL-SCNC: 93 MMOL/L — LOW (ref 98–110)
CHLORIDE SERPL-SCNC: 96 MMOL/L — LOW (ref 98–110)
CO2 SERPL-SCNC: 24 MMOL/L — SIGNIFICANT CHANGE UP (ref 17–32)
CO2 SERPL-SCNC: 24 MMOL/L — SIGNIFICANT CHANGE UP (ref 17–32)
CREAT SERPL-MCNC: 0.9 MG/DL — SIGNIFICANT CHANGE UP (ref 0.7–1.5)
CREAT SERPL-MCNC: 1 MG/DL — SIGNIFICANT CHANGE UP (ref 0.7–1.5)
EOSINOPHIL # BLD AUTO: 0.08 K/UL — SIGNIFICANT CHANGE UP (ref 0–0.7)
EOSINOPHIL NFR BLD AUTO: 1.4 % — SIGNIFICANT CHANGE UP (ref 0–8)
GLUCOSE BLDC GLUCOMTR-MCNC: 123 MG/DL — HIGH (ref 70–99)
GLUCOSE BLDC GLUCOMTR-MCNC: 136 MG/DL — HIGH (ref 70–99)
GLUCOSE BLDC GLUCOMTR-MCNC: 177 MG/DL — HIGH (ref 70–99)
GLUCOSE BLDC GLUCOMTR-MCNC: 193 MG/DL — HIGH (ref 70–99)
GLUCOSE SERPL-MCNC: 132 MG/DL — HIGH (ref 70–99)
GLUCOSE SERPL-MCNC: 182 MG/DL — HIGH (ref 70–99)
HCT VFR BLD CALC: 43 % — SIGNIFICANT CHANGE UP (ref 37–47)
HGB BLD-MCNC: 13.8 G/DL — SIGNIFICANT CHANGE UP (ref 12–16)
IMM GRANULOCYTES NFR BLD AUTO: 0.7 % — HIGH (ref 0.1–0.3)
INR BLD: 1.41 RATIO — HIGH (ref 0.65–1.3)
LYMPHOCYTES # BLD AUTO: 1.58 K/UL — SIGNIFICANT CHANGE UP (ref 1.2–3.4)
LYMPHOCYTES # BLD AUTO: 28.4 % — SIGNIFICANT CHANGE UP (ref 20.5–51.1)
MAGNESIUM SERPL-MCNC: 1.4 MG/DL — LOW (ref 1.8–2.4)
MAGNESIUM SERPL-MCNC: 1.4 MG/DL — LOW (ref 1.8–2.4)
MCHC RBC-ENTMCNC: 29.2 PG — SIGNIFICANT CHANGE UP (ref 27–31)
MCHC RBC-ENTMCNC: 32.1 G/DL — SIGNIFICANT CHANGE UP (ref 32–37)
MCV RBC AUTO: 90.9 FL — SIGNIFICANT CHANGE UP (ref 81–99)
MONOCYTES # BLD AUTO: 0.44 K/UL — SIGNIFICANT CHANGE UP (ref 0.1–0.6)
MONOCYTES NFR BLD AUTO: 7.9 % — SIGNIFICANT CHANGE UP (ref 1.7–9.3)
NEUTROPHILS # BLD AUTO: 3.33 K/UL — SIGNIFICANT CHANGE UP (ref 1.4–6.5)
NEUTROPHILS NFR BLD AUTO: 59.8 % — SIGNIFICANT CHANGE UP (ref 42.2–75.2)
NRBC # BLD: 0 /100 WBCS — SIGNIFICANT CHANGE UP (ref 0–0)
PLATELET # BLD AUTO: 283 K/UL — SIGNIFICANT CHANGE UP (ref 130–400)
POTASSIUM SERPL-MCNC: 4 MMOL/L — SIGNIFICANT CHANGE UP (ref 3.5–5)
POTASSIUM SERPL-MCNC: 4.2 MMOL/L — SIGNIFICANT CHANGE UP (ref 3.5–5)
POTASSIUM SERPL-SCNC: 4 MMOL/L — SIGNIFICANT CHANGE UP (ref 3.5–5)
POTASSIUM SERPL-SCNC: 4.2 MMOL/L — SIGNIFICANT CHANGE UP (ref 3.5–5)
PROT SERPL-MCNC: 6.2 G/DL — SIGNIFICANT CHANGE UP (ref 6–8)
PROT SERPL-MCNC: 6.7 G/DL — SIGNIFICANT CHANGE UP (ref 6–8)
PROTHROM AB SERPL-ACNC: 16.2 SEC — HIGH (ref 9.95–12.87)
RBC # BLD: 4.73 M/UL — SIGNIFICANT CHANGE UP (ref 4.2–5.4)
RBC # FLD: 16.3 % — HIGH (ref 11.5–14.5)
SODIUM SERPL-SCNC: 136 MMOL/L — SIGNIFICANT CHANGE UP (ref 135–146)
SODIUM SERPL-SCNC: 140 MMOL/L — SIGNIFICANT CHANGE UP (ref 135–146)
TROPONIN T SERPL-MCNC: 0.26 NG/ML — CRITICAL HIGH
WBC # BLD: 5.57 K/UL — SIGNIFICANT CHANGE UP (ref 4.8–10.8)
WBC # FLD AUTO: 5.57 K/UL — SIGNIFICANT CHANGE UP (ref 4.8–10.8)

## 2021-11-12 PROCEDURE — 71045 X-RAY EXAM CHEST 1 VIEW: CPT | Mod: 26

## 2021-11-12 PROCEDURE — 99233 SBSQ HOSP IP/OBS HIGH 50: CPT

## 2021-11-12 RX ORDER — SODIUM CHLORIDE 5 G/100ML
500 INJECTION, SOLUTION INTRAVENOUS
Refills: 0 | Status: DISCONTINUED | OUTPATIENT
Start: 2021-11-12 | End: 2021-11-13

## 2021-11-12 RX ORDER — SODIUM CHLORIDE 5 G/100ML
500 INJECTION, SOLUTION INTRAVENOUS
Refills: 0 | Status: DISCONTINUED | OUTPATIENT
Start: 2021-11-13 | End: 2021-11-13

## 2021-11-12 RX ADMIN — Medication 81 MILLIGRAM(S): at 11:50

## 2021-11-12 RX ADMIN — SACUBITRIL AND VALSARTAN 1 TABLET(S): 24; 26 TABLET, FILM COATED ORAL at 06:03

## 2021-11-12 RX ADMIN — PANTOPRAZOLE SODIUM 40 MILLIGRAM(S): 20 TABLET, DELAYED RELEASE ORAL at 06:03

## 2021-11-12 RX ADMIN — Medication 2 GRAM(S): at 17:39

## 2021-11-12 RX ADMIN — Medication 1: at 17:37

## 2021-11-12 RX ADMIN — MAGNESIUM OXIDE 400 MG ORAL TABLET 400 MILLIGRAM(S): 241.3 TABLET ORAL at 17:39

## 2021-11-12 RX ADMIN — ATORVASTATIN CALCIUM 80 MILLIGRAM(S): 80 TABLET, FILM COATED ORAL at 21:35

## 2021-11-12 RX ADMIN — SODIUM CHLORIDE 150 MILLILITER(S): 5 INJECTION, SOLUTION INTRAVENOUS at 06:04

## 2021-11-12 RX ADMIN — CHLORHEXIDINE GLUCONATE 1 APPLICATION(S): 213 SOLUTION TOPICAL at 06:04

## 2021-11-12 RX ADMIN — CLOPIDOGREL BISULFATE 75 MILLIGRAM(S): 75 TABLET, FILM COATED ORAL at 11:50

## 2021-11-12 RX ADMIN — Medication 145 MILLIGRAM(S): at 11:51

## 2021-11-12 RX ADMIN — Medication 2 GRAM(S): at 06:14

## 2021-11-12 RX ADMIN — Medication 50 MILLIGRAM(S): at 06:03

## 2021-11-12 RX ADMIN — MAGNESIUM OXIDE 400 MG ORAL TABLET 400 MILLIGRAM(S): 241.3 TABLET ORAL at 11:51

## 2021-11-12 RX ADMIN — SODIUM CHLORIDE 150 MILLILITER(S): 5 INJECTION, SOLUTION INTRAVENOUS at 18:01

## 2021-11-12 RX ADMIN — MAGNESIUM OXIDE 400 MG ORAL TABLET 400 MILLIGRAM(S): 241.3 TABLET ORAL at 08:13

## 2021-11-12 RX ADMIN — SACUBITRIL AND VALSARTAN 1 TABLET(S): 24; 26 TABLET, FILM COATED ORAL at 17:39

## 2021-11-12 RX ADMIN — BUDESONIDE AND FORMOTEROL FUMARATE DIHYDRATE 2 PUFF(S): 160; 4.5 AEROSOL RESPIRATORY (INHALATION) at 08:13

## 2021-11-12 NOTE — PROGRESS NOTE ADULT - ASSESSMENT
56 year old female with a pmhx of chronic hypoxic respiratory failure (on 4L home O2) secondary to ( HFrEF 35-40%, GIII DD)  with pulmonary hypertension, HTN, HLD, DM2, active smoker , CVA with residual LE weakness (2014, wheelchair bound),  COPD , mood /depression,  presents of worsening dyspnea, orthopnea, weight gain concerning for acute hypoxic respiratory failure 2/2 to acute CHF exacerbation.     #Acute Hypoxic Respiratory Failure 2/2 HFmEF exacerbation - no sepsis poa - still volume overloaded  #H/o HTN  - p/w dyspnea, orthopnea, ~25lb recent weight gain; PE w  3+ pitting edema, decr bs and crackles; BNP 7542   - CXR w increased b/l opacities and cardiomegaly; CTA w findings c/w interstitial pulmonary edema w associated small bilateral pleural effusions  - last echo (6/14/21) w EF35-40% w G3DD & pulm htn; on torsemide at home  - HF team consult appreciated  - cont bumex gtt + hypertonic saline, along w GDMT (entresto, bb)  - f/u repeat echo  - monitor daily cxr & bmp bid - maintain k >4, mg >2.1  - strict I&Os, daily weights    #Acute NSTEMI   #H/o CAD s/p PCI (6/2021)  #H/o CVA w residual LE weakness  - Trop 02>.05>.06>.28>.41; CKMB 24.1  - cont Heparin gtt (acs protocol) - monitor ptts  - c/w dapt (in light of recent stent), statin, bb  - cardio following - will cath when pt clinically euvolemic  - pain control prn    #Tachycardia w h/o recurrent SVT - likely atrial tachycardia  - EKG showing sinus tachy on admission; d-dimer 800  - duplex neg for dvt; cta neg for pe  - seen by EP during admission in june (was started on amiodarone but reportedly never picked up medication after d/c)  - cont bb, holding off on amio for now - monitor on tele - possible reconsult EP   - on heparin gtt    #COPD on 4L home O2  #CORNELIUS on CPAP  - currently saturating well on NC  - cont inhalers and nebs prn  - will inquire about bringing home CPAP to use qHS    #H/o HLD  - cont fenofibrate and lipitor     #DM type 2 (last A1c 06/2021 6.8)  - monitor FS - add basal bolus insulin if needed    #H/o Mood Disorder  - per allscripts, pt alon takes seroquel and was previously prescribed depakote and ativan    #Morbid Obesity (BMI 45.8)  #Active Smoker  - counseled smoking cessation, diet and exercise  - outpatient nutrition follow up    #Vitamin D Deficiency   - cont vit D 50,000 Q weekly     DVT ppx: heparin gtt  GI ppx: ppi   Diet: DASH   Activity: IAT - uses wheelchair    Full code

## 2021-11-12 NOTE — PROGRESS NOTE ADULT - SUBJECTIVE AND OBJECTIVE BOX
Date of Admission: 21    Interval History:    - Patient resting in bed, reports lack of sleep- requesting home dose of Seroquel   - States breathing has improved   - Remains massively fluid overloaded, responding well to IV diuretics        HISTORY OF PRESENT ILLNESS:     56 year old female with a pmhx of chronic hypoxic respiratory failure (on 4L home O2) secondary to ( HFrEF 35-40%, GIII DD)  with pulmonary hypertension, HTN, HLD, DM2, active smoker , CVA with residual LE weakness (2014, wheelchair bound),  COPD , mood /depression,  presents of worsening shortness of breath and increase weight with  B/L LE over past week. Associated with increase oxygen use, mild SOB at rest orthopnea, and palpitations. She endorses compliance with home torsemide, as well as fluid restriction and low salt diet. She denies CP.     Patient resting in bed, c/o generalized pain and abdominal discomfort, reports for couple of days she had worsening SOB, and was feeling bloated. Patient is not very active however she was SOB with minimal exertion. Patient denies c/p, palpitations, headaches, bleeding, LH, dizziness, orthopnea, PND, LOC, cough. Endorses compliance with medications and follows a low sodium diet       PAST MEDICAL & SURGICAL HISTORY:      Hypertension  Hyperlipidemia  Anxiety and depression  COPD, severe  CHF (congestive heart failure)  Cerebrovascular accident (CVA)Multiple  Type 2 diabetes mellitus  CKD (chronic kidney disease), stage II  No significant past surgical history        FAMILY HISTORY:  No pertinent family history of premature cardiovascular disease in first degree relatives.  Mother:  of cancer at 65  Father:  of an overdose at 50    SOCIAL HISTORY:      Current every day smoker, 2 cigarettes a day, denies alcohol or drug use    Allergies    No Known Allergies    Intolerances      PHYSICAL EXAM:  General Appearance: well appearing, normal for age and gender. 	  Neck: unable to assess due to body habitus JVP, no bruit.   Cardiovascular: regular rate and rhythm S1 S2, , No murmurs, Generalized edema  Respiratory: B/L expiratory wheezing  Psychiatry: Alert and oriented x 3, Mood & affect appropriate  Gastrointestinal:  Soft, Non-tender  Skin/Integumentary : No rashes, No ecchymoses, No cyanosis	  Neurologic: Non-focal  Musculoskeletal/ extremities: Normal range of motion, No clubbing, cyanosis   Vascular: Peripheral pulses palpable 2+ bilaterally    CARDIAC MARKERS:    Serum Pro-Brain Natriuretic Peptide: 7542 pg/mL (11.08.21 @ 11:27)      TELEMETRY EVENTS: 	    EC/8/21    Ventricular Rate 72 BPM  Atrial Rate 72 BPM  P-R Interval 192 ms  QRS Duration 162 ms  Q-T Interval 498 ms  QTC Calculation(Bazett) 545 ms  P Axis 35 degrees  R Axis -88 degrees  T Axis 82 degrees  Diagnosis Line Normal sinus rhythm  Possible Left atrial enlargement  Left axis deviation  Non-specific intra-ventricular conduction block  Abnormal ECG  Confirmed by CARLOTA GAUTHIER MD ([id number]) on 2021 8:55:01 AM      PREVIOUS DIAGNOSTIC TESTING:      TTE 21    Summary:   1. Left ventricular ejection fraction, by visual estimation, is 35 to 40%.   2.Moderately decreased global left ventricular systolic function.   3. Multiple left ventricular regional wall motion abnormalities exist. See wall motion findings.   4. Elevated left atrial and left ventricular end-diastolic pressures.   5. Mild concentric left ventricular hypertrophy.   6. Mildly increased LV wall thickness.   7. Normal left ventricular internal cavity size.   8. Spectral Doppler shows restrictive pattern of left ventricular myocardial filling (Grade III diastolic dysfunction).  9. Moderately reduced RV systolic function.  10. Mildly enlarged left atrium.  11. Moderately enlarged right atrium.  12. Small pericardial effusion.  13. Mild to moderate mitral valve regurgitation.  14. Moderate-severe tricuspid regurgitation.  15.Mild aortic regurgitation.  16. Sclerotic aortic valve with normal opening.  17. Estimated pulmonary artery systolic pressure is 50.0 mmHg assuming a right atrial pressure of 15 mmHg, which is consistent with moderate pulmonary hypertension.  18. Pulmonary hypertension is present.  19. LA volume Index is 36.7 ml/m² ml/m2.    	    Home Medications:  Albuterol (Eqv-ProAir HFA) 90 mcg/inh inhalation aerosol: 2 puff(s) inhaled every 6 hours, As Needed (2021 11:22)  aspirin 81 mg oral tablet: 1 tab(s) orally once a day (2021 11:22)  fenofibrate 145 mg oral tablet: 1 tab(s) orally once a day (2021 11:22)  glipiZIDE 10 mg oral tablet: 1 tab(s) orally 2 times a day (2021 11:22)  Lovaza 1000 mg oral capsule: 2 cap(s) orally 2 times a day (2021 14:52)  metFORMIN 1000 mg oral tablet: 1 tab(s) orally 2 times a day Do not take until  (2021 12:58)  Plavix 75 mg oral tablet: 1 tab(s) orally once a day (2021 11:22)  Vitamin D2 1.25 mg (50,000 intl units) oral capsule: 1 cap(s) orally once a week (2021 14:52)    MEDICATIONS  (STANDING):  aspirin  chewable 81 milliGRAM(s) Oral daily  atorvastatin 80 milliGRAM(s) Oral at bedtime  budesonide 160 MICROgram(s)/formoterol 4.5 MICROgram(s) Inhaler 2 Puff(s) Inhalation two times a day  chlorhexidine 4% Liquid 1 Application(s) Topical <User Schedule>  clopidogrel Tablet 75 milliGRAM(s) Oral daily  enoxaparin Injectable 40 milliGRAM(s) SubCutaneous daily  fenofibrate Tablet 145 milliGRAM(s) Oral daily  furosemide   Injectable 40 milliGRAM(s) IV Push two times a day  magnesium sulfate  IVPB 2 Gram(s) IV Intermittent once  metoprolol succinate ER 50 milliGRAM(s) Oral daily  omega-3-Acid Ethyl Esters 2 Gram(s) Oral two times a day  pantoprazole    Tablet 40 milliGRAM(s) Oral before breakfast  sacubitril 49 mG/valsartan 51 mG 1 Tablet(s) Oral two times a day    MEDICATIONS  (PRN):  ALBUTerol    90 MICROgram(s) HFA Inhaler 2 Puff(s) Inhalation every 6 hours PRN Shortness of Breath and/or Wheezing

## 2021-11-12 NOTE — PROGRESS NOTE ADULT - ASSESSMENT
Acute on chronic systolic HF /Fluid overload /pulmonary HTN    Patient remains fluid overloaded on exam- IVC 3cm non collapsing  Continue Bumex gtt at 1 mg/hr   Continue 3% Hypertonic Saline 150ml BID  BMP twice daily   Maintain potassium >4.0, Mg >2.1  Strict intake and output  Daily weight   Plan discussed with primary team  Will continue to follow  Acute on chronic systolic HF /Fluid overload /pulmonary HTN    Patient remains fluid overloaded on exam- IVC 3cm non collapsing  Continue Bumex gtt at 1 mg/hr   Continue 3% Hypertonic Saline 150ml BID  BMP twice daily   Maintain potassium >4.0, Mg >2.1  Strict intake and output  Daily weight   Plan discussed with primary team  Will continue to follow       **ATTENDING ATTESTATION PENDING** Acute on chronic systolic HF /Fluid overload /pulmonary HTN    Patient remains fluid overloaded on exam- IVC 3cm non collapsing  Continue Bumex gtt at 1 mg/hr   Continue 3% Hypertonic Saline 150ml BID  Add metolazone 2.5 mg daily on 11/13 if urine output <3 L  Continue Entresto 49-51 mg BID   BMP twice daily   Maintain potassium >4.0, Mg >2.1  Strict intake and output  Daily weight   Plan discussed with primary team  Will continue to follow

## 2021-11-12 NOTE — PROGRESS NOTE ADULT - ASSESSMENT
A 56 year old female with a PMH of chronic hypoxic respiratory failure (on 4L home O2) secondary to ( HFrEF 35-40%, GIII DD)  with pulmonary hypertension, HTN, HLD, DM2, active smoker , CVA with residual LE weakness (2014, wheelchair bound),  COPD , mood /depression,  presents of worsening shortness of breath and increase weight with  B/L LE swelling over past week.    Acute on chronic HFrEF  DM-2 / HTN / DL  Chronic hypoxic respiratory failure  Pulmonary HTN  Tobacco use  H/O CVA with residual LE weakness  COPD  Mood disorder / Depression  NSTEMI                PLAN:    ·	Still fluid overload. Care D/W the HF specialist. Recommended to cont current management on weekend.   ·	CE trending down now  ·	Cont ASA, Plavix, Lipitor, Metoprolol   ·	Plan for cath when euvolemic.   ·	Cont Bumex infusion and hypertonic saline as per HF specialist recommendation.   ·	A negative balance of 1215 over the last 24 hrs  ·	Check i's and o's and daily wt  ·	Low salt diet and water restriction to 1.5 L/D  ·	Check BMP and Mg twice a day. Replete Mg. Give 4 gm magnesium sulfate iv x 1  ·	Had cath done in June 2021 and RICARDO placed in mid RCA  ·	GDMT. On Metoprolol and Entresto  ·	Cont her other meds    Progress Note Handoff    Pending (specify):  Consults_________, Tests________, Test Results_______, Other___Fluid overload, NSTEMI_____  Family discussion:  Disposition: Home___/SNF___/Other________/Unknown at this time________    Stanley Domingo MD  Spectra: [phone]

## 2021-11-12 NOTE — PROGRESS NOTE ADULT - SUBJECTIVE AND OBJECTIVE BOX
WILLIAN MARY  56y Female    CHIEF COMPLAINT:    Patient is a 56y old  Female who presents with a chief complaint of HFmEF exacerbation (2021 12:04)      INTERVAL HPI/OVERNIGHT EVENTS:    Patient seen and examined. Denies sob on 5L NC. On home O2 4L NC. Still fluid overload. No cough. No palpitations.     ROS: All other systems are negative.    Vital Signs:    T(F): 96 (21 @ 13:17), Max: 98 (21 @ 02:47)  HR: 96 (21 @ 13:17) (72 - 96)  BP: 123/66 (21 @ 13:17) (123/66 - 149/94)  RR: 18 (21 @ 13:17) (18 - 19)  SpO2: 97% (21 @ 09:23) (97% - 97%)  I&O's Summary    2021 07:01  -  2021 07:00  --------------------------------------------------------  IN:  mL / OUT: 3225 mL / NET: -1215 mL    2021 07:01  -  2021 15:24  --------------------------------------------------------  IN: 960 mL / OUT: 1500 mL / NET: -540 mL      Daily     Daily Weight in k.3 (2021 06:00)  CAPILLARY BLOOD GLUCOSE      POCT Blood Glucose.: 136 mg/dL (2021 11:31)  POCT Blood Glucose.: 123 mg/dL (2021 08:06)  POCT Blood Glucose.: 170 mg/dL (2021 21:45)  POCT Blood Glucose.: 117 mg/dL (2021 16:38)      PHYSICAL EXAM:    GENERAL:  NAD  SKIN: No rashes or lesions  HENT: Atraumatic. Normocephalic. PERRL. Moist membranes.  NECK: Supple, No JVD. No lymphadenopathy.  PULMONARY: CTA B/L. No wheezing. No rales  CVS: Normal S1, S2. Rate and Rhythm are regular. No murmurs.  ABDOMEN/GI: Soft, Nontender, Nondistended; BS present  EXTREMITIES: Peripheral pulses intact. +ve edema B/L LE.  NEUROLOGIC:  No motor or sensory deficit.  PSYCH: Alert & oriented x 3    Consultant(s) Notes Reviewed:  [x ] YES  [ ] NO  Care Discussed with Consultants/Other Providers [ x] YES  [ ] NO    EKG reviewed  Telemetry reviewed    LABS:                        13.8   5.57  )-----------( 283      ( 2021 07:30 )             43.0         140  |  96<L>  |  16  ----------------------------<  132<H>  4.0   |  24  |  0.9    Ca    8.5      2021 07:30  Mg     1.4         TPro  6.2  /  Alb  3.2<L>  /  TBili  1.1  /  DBili  x   /  AST  30  /  ALT  10  /  AlkPhos  78  11-12    PT/INR - ( 2021 07:30 )   PT: 16.20 sec;   INR: 1.41 ratio         PTT - ( 2021 12:38 )  PTT:77.8 sec  Serum Pro-Brain Natriuretic Peptide: 7542 pg/mL (21 @ 11:27)    Trop 0.26, CKMB --, CK --, 21 @ 07:30  Trop 0.40, CKMB 8.0, , 21 @ 18:17  Trop 0.38, CKMB 11.7, , 21 @ 11:00  Trop 0.41, CKMB --, CK --, 21 @ 04:30  Trop 0.28, CKMB 24.1, , 11-10-21 @ 18:31  Trop 0.06, CKMB --, CK --, 21 @ 22:51  Trop 0.05, CKMB --, CK --, 21 @ 16:45        RADIOLOGY & ADDITIONAL TESTS:      Imaging or report Personally Reviewed:  [ ] YES  [ ] NO    Medications:  Standing  aspirin  chewable 81 milliGRAM(s) Oral daily  atorvastatin 80 milliGRAM(s) Oral at bedtime  budesonide 160 MICROgram(s)/formoterol 4.5 MICROgram(s) Inhaler 2 Puff(s) Inhalation two times a day  buMETAnide Infusion 1 mG/Hr IV Continuous <Continuous>  chlorhexidine 4% Liquid 1 Application(s) Topical <User Schedule>  clopidogrel Tablet 75 milliGRAM(s) Oral daily  dextrose 40% Gel 15 Gram(s) Oral once  dextrose 5%. 1000 milliLiter(s) IV Continuous <Continuous>  dextrose 5%. 1000 milliLiter(s) IV Continuous <Continuous>  dextrose 50% Injectable 25 Gram(s) IV Push once  dextrose 50% Injectable 12.5 Gram(s) IV Push once  dextrose 50% Injectable 25 Gram(s) IV Push once  fenofibrate Tablet 145 milliGRAM(s) Oral daily  glucagon  Injectable 1 milliGRAM(s) IntraMuscular once  heparin  Infusion.  Unit(s)/Hr IV Continuous <Continuous>  insulin lispro (ADMELOG) corrective regimen sliding scale   SubCutaneous three times a day before meals  magnesium oxide 400 milliGRAM(s) Oral three times a day with meals  magnesium sulfate  IVPB 2 Gram(s) IV Intermittent once  metoprolol succinate ER 50 milliGRAM(s) Oral daily  omega-3-Acid Ethyl Esters 2 Gram(s) Oral two times a day  pantoprazole    Tablet 40 milliGRAM(s) Oral before breakfast  sacubitril 49 mG/valsartan 51 mG 1 Tablet(s) Oral two times a day  sodium chloride 3%. 500 milliLiter(s) IV Continuous <Continuous>  sodium chloride 3%. 500 milliLiter(s) IV Continuous <Continuous>  sodium chloride 3%. 500 milliLiter(s) IV Continuous <Continuous>  sodium chloride 3%. 500 milliLiter(s) IV Continuous <Continuous>  sodium chloride 3%. 500 milliLiter(s) IV Continuous <Continuous>    PRN Meds  ALBUTerol    90 MICROgram(s) HFA Inhaler 2 Puff(s) Inhalation every 6 hours PRN  heparin   Injectable 6000 Unit(s) IV Push every 6 hours PRN      Case discussed with resident    Care discussed with pt/family

## 2021-11-12 NOTE — PROGRESS NOTE ADULT - SUBJECTIVE AND OBJECTIVE BOX
SUBJECTIVE:    Patient is a 56y old Female who presents with a chief complaint of worsening dyspnea, orthopnea and weight gain.    Currently admitted to medicine with the primary diagnosis of CHF exacerbation    24 hour update:  Today is hospital day 4d. Patient volume status improving, still on bumex gtt; Trops still elevated; still no heparin gtt; planning on cath with cardio next week.      PAST MEDICAL & SURGICAL HISTORY  Hypertension    Hyperlipidemia    Anxiety and depression    COPD, severe    CHF (congestive heart failure)    Cerebrovascular accident (CVA)  Multiple    Type 2 diabetes mellitus    CKD (chronic kidney disease), stage II    No significant past surgical history      SOCIAL HISTORY:    ALLERGIES:  No Known Allergies    MEDICATIONS:  STANDING MEDICATIONS  aspirin  chewable 81 milliGRAM(s) Oral daily  atorvastatin 80 milliGRAM(s) Oral at bedtime  budesonide 160 MICROgram(s)/formoterol 4.5 MICROgram(s) Inhaler 2 Puff(s) Inhalation two times a day  buMETAnide Infusion 1 mG/Hr IV Continuous <Continuous>  chlorhexidine 4% Liquid 1 Application(s) Topical <User Schedule>  clopidogrel Tablet 75 milliGRAM(s) Oral daily  enoxaparin Injectable 40 milliGRAM(s) SubCutaneous daily  fenofibrate Tablet 145 milliGRAM(s) Oral daily  metoprolol succinate ER 50 milliGRAM(s) Oral daily  omega-3-Acid Ethyl Esters 2 Gram(s) Oral two times a day  pantoprazole    Tablet 40 milliGRAM(s) Oral before breakfast  sacubitril 49 mG/valsartan 51 mG 1 Tablet(s) Oral two times a day  sodium chloride 3%. 150 milliLiter(s) IV Continuous <Continuous>    PRN MEDICATIONS  ALBUTerol    90 MICROgram(s) HFA Inhaler 2 Puff(s) Inhalation every 6 hours PRN    VITALS:   T(C): 35.7 (12 Nov 2021 09:05), Max: 36.7 (12 Nov 2021 02:47)  T(F): 96.2 (12 Nov 2021 09:05), Max: 98 (12 Nov 2021 02:47)  HR: 94 (12 Nov 2021 09:05) (72 - 94)  BP: 149/94 (12 Nov 2021 09:05) (128/83 - 151/70)  RR: 18 (12 Nov 2021 09:05) (18 - 20)  SpO2: 97% (12 Nov 2021 09:23) (97% - 97%)    PHYSICAL EXAM:  GENERAL: NAD  NERVOUS SYSTEM: AAOx3  CHEST/LUNG: decr bs b/l +crackles  HEART: +s1s2 RRR  ABDOMEN: soft, NT/ND  EXTREMITIES:  3+ pitting edema    LABS:               13.8   5.57  )-----------( 283      ( 12 Nov 2021 07:30 )             43.0     140  |  96<L>  |  16  ----------------------------<  132<H>  4.0   |  24  |  0.9    Ca    8.5      12 Nov 2021 07:30  Mg     1.4     11-12    TPro  6.2  /  Alb  3.2<L>  /  TBili  1.1  /  DBili  x   /  AST  30  /  ALT  10  /  AlkPhos  78  11-12    PT/INR - ( 12 Nov 2021 07:30 )   PT: 16.20 sec;   INR: 1.41 ratio    PTT - ( 12 Nov 2021 07:30 )  PTT:85.7 sec    CARDIAC MARKERS ( 12 Nov 2021 07:30 )  x     / 0.26 ng/mL / x     / x     / x      CARDIAC MARKERS ( 11 Nov 2021 18:17 )  x     / 0.40 ng/mL / 141 U/L / x     / 8.0 ng/mL  CARDIAC MARKERS ( 11 Nov 2021 11:00 )  x     / 0.38 ng/mL / 156 U/L / x     / 11.7 ng/mL  CARDIAC MARKERS ( 11 Nov 2021 04:30 )  x     / 0.41 ng/mL / x     / x     / x      CARDIAC MARKERS ( 10 Nov 2021 18:31 )  x     / 0.28 ng/mL / 230 U/L / x     / 24.1 ng/mL    Serum Pro-Brain Natriuretic Peptide: 7542 pg/mL (11-08-21 @ 11:27)          IMAGING:

## 2021-11-13 LAB
ALBUMIN SERPL ELPH-MCNC: 3.4 G/DL — LOW (ref 3.5–5.2)
ALP SERPL-CCNC: 90 U/L — SIGNIFICANT CHANGE UP (ref 30–115)
ALT FLD-CCNC: 9 U/L — SIGNIFICANT CHANGE UP (ref 0–41)
ANION GAP SERPL CALC-SCNC: 20 MMOL/L — HIGH (ref 7–14)
APTT BLD: 47.9 SEC — HIGH (ref 27–39.2)
APTT BLD: 58.7 SEC — HIGH (ref 27–39.2)
AST SERPL-CCNC: 20 U/L — SIGNIFICANT CHANGE UP (ref 0–41)
BASOPHILS # BLD AUTO: 0.1 K/UL — SIGNIFICANT CHANGE UP (ref 0–0.2)
BASOPHILS NFR BLD AUTO: 1.7 % — HIGH (ref 0–1)
BILIRUB SERPL-MCNC: 1.1 MG/DL — SIGNIFICANT CHANGE UP (ref 0.2–1.2)
BUN SERPL-MCNC: 19 MG/DL — SIGNIFICANT CHANGE UP (ref 10–20)
CALCIUM SERPL-MCNC: 8.6 MG/DL — SIGNIFICANT CHANGE UP (ref 8.5–10.1)
CHLORIDE SERPL-SCNC: 92 MMOL/L — LOW (ref 98–110)
CO2 SERPL-SCNC: 26 MMOL/L — SIGNIFICANT CHANGE UP (ref 17–32)
CREAT SERPL-MCNC: 0.9 MG/DL — SIGNIFICANT CHANGE UP (ref 0.7–1.5)
EOSINOPHIL # BLD AUTO: 0.09 K/UL — SIGNIFICANT CHANGE UP (ref 0–0.7)
EOSINOPHIL NFR BLD AUTO: 1.5 % — SIGNIFICANT CHANGE UP (ref 0–8)
GLUCOSE BLDC GLUCOMTR-MCNC: 135 MG/DL — HIGH (ref 70–99)
GLUCOSE BLDC GLUCOMTR-MCNC: 138 MG/DL — HIGH (ref 70–99)
GLUCOSE BLDC GLUCOMTR-MCNC: 151 MG/DL — HIGH (ref 70–99)
GLUCOSE BLDC GLUCOMTR-MCNC: 201 MG/DL — HIGH (ref 70–99)
GLUCOSE SERPL-MCNC: 211 MG/DL — HIGH (ref 70–99)
HCT VFR BLD CALC: 45.3 % — SIGNIFICANT CHANGE UP (ref 37–47)
HGB BLD-MCNC: 14.4 G/DL — SIGNIFICANT CHANGE UP (ref 12–16)
IMM GRANULOCYTES NFR BLD AUTO: 0.5 % — HIGH (ref 0.1–0.3)
LYMPHOCYTES # BLD AUTO: 1.66 K/UL — SIGNIFICANT CHANGE UP (ref 1.2–3.4)
LYMPHOCYTES # BLD AUTO: 27.8 % — SIGNIFICANT CHANGE UP (ref 20.5–51.1)
MAGNESIUM SERPL-MCNC: 1.3 MG/DL — LOW (ref 1.8–2.4)
MCHC RBC-ENTMCNC: 28.6 PG — SIGNIFICANT CHANGE UP (ref 27–31)
MCHC RBC-ENTMCNC: 31.8 G/DL — LOW (ref 32–37)
MCV RBC AUTO: 90.1 FL — SIGNIFICANT CHANGE UP (ref 81–99)
MONOCYTES # BLD AUTO: 0.33 K/UL — SIGNIFICANT CHANGE UP (ref 0.1–0.6)
MONOCYTES NFR BLD AUTO: 5.5 % — SIGNIFICANT CHANGE UP (ref 1.7–9.3)
NEUTROPHILS # BLD AUTO: 3.77 K/UL — SIGNIFICANT CHANGE UP (ref 1.4–6.5)
NEUTROPHILS NFR BLD AUTO: 63 % — SIGNIFICANT CHANGE UP (ref 42.2–75.2)
NRBC # BLD: 0 /100 WBCS — SIGNIFICANT CHANGE UP (ref 0–0)
PLATELET # BLD AUTO: 308 K/UL — SIGNIFICANT CHANGE UP (ref 130–400)
POTASSIUM SERPL-MCNC: 3.7 MMOL/L — SIGNIFICANT CHANGE UP (ref 3.5–5)
POTASSIUM SERPL-SCNC: 3.7 MMOL/L — SIGNIFICANT CHANGE UP (ref 3.5–5)
PROT SERPL-MCNC: 6.6 G/DL — SIGNIFICANT CHANGE UP (ref 6–8)
RBC # BLD: 5.03 M/UL — SIGNIFICANT CHANGE UP (ref 4.2–5.4)
RBC # FLD: 16.3 % — HIGH (ref 11.5–14.5)
SODIUM SERPL-SCNC: 138 MMOL/L — SIGNIFICANT CHANGE UP (ref 135–146)
TROPONIN T SERPL-MCNC: 0.33 NG/ML — CRITICAL HIGH
WBC # BLD: 5.98 K/UL — SIGNIFICANT CHANGE UP (ref 4.8–10.8)
WBC # FLD AUTO: 5.98 K/UL — SIGNIFICANT CHANGE UP (ref 4.8–10.8)

## 2021-11-13 PROCEDURE — 99232 SBSQ HOSP IP/OBS MODERATE 35: CPT

## 2021-11-13 PROCEDURE — 71045 X-RAY EXAM CHEST 1 VIEW: CPT | Mod: 26

## 2021-11-13 PROCEDURE — 93010 ELECTROCARDIOGRAM REPORT: CPT

## 2021-11-13 RX ORDER — MAGNESIUM SULFATE 500 MG/ML
2 VIAL (ML) INJECTION ONCE
Refills: 0 | Status: COMPLETED | OUTPATIENT
Start: 2021-11-13 | End: 2021-11-13

## 2021-11-13 RX ORDER — QUETIAPINE FUMARATE 200 MG/1
100 TABLET, FILM COATED ORAL DAILY
Refills: 0 | Status: DISCONTINUED | OUTPATIENT
Start: 2021-11-13 | End: 2021-11-13

## 2021-11-13 RX ORDER — QUETIAPINE FUMARATE 200 MG/1
100 TABLET, FILM COATED ORAL AT BEDTIME
Refills: 0 | Status: DISCONTINUED | OUTPATIENT
Start: 2021-11-13 | End: 2021-11-13

## 2021-11-13 RX ORDER — MAGNESIUM SULFATE 500 MG/ML
2 VIAL (ML) INJECTION EVERY 12 HOURS
Refills: 0 | Status: DISCONTINUED | OUTPATIENT
Start: 2021-11-13 | End: 2021-11-14

## 2021-11-13 RX ORDER — SODIUM CHLORIDE 5 G/100ML
150 INJECTION, SOLUTION INTRAVENOUS
Refills: 0 | Status: DISCONTINUED | OUTPATIENT
Start: 2021-11-13 | End: 2021-11-13

## 2021-11-13 RX ORDER — SODIUM CHLORIDE 5 G/100ML
150 INJECTION, SOLUTION INTRAVENOUS
Refills: 0 | Status: DISCONTINUED | OUTPATIENT
Start: 2021-11-14 | End: 2021-11-15

## 2021-11-13 RX ORDER — LANOLIN ALCOHOL/MO/W.PET/CERES
3 CREAM (GRAM) TOPICAL AT BEDTIME
Refills: 0 | Status: DISCONTINUED | OUTPATIENT
Start: 2021-11-13 | End: 2021-11-18

## 2021-11-13 RX ORDER — SODIUM CHLORIDE 5 G/100ML
150 INJECTION, SOLUTION INTRAVENOUS
Refills: 0 | Status: DISCONTINUED | OUTPATIENT
Start: 2021-11-13 | End: 2021-11-15

## 2021-11-13 RX ADMIN — BUDESONIDE AND FORMOTEROL FUMARATE DIHYDRATE 2 PUFF(S): 160; 4.5 AEROSOL RESPIRATORY (INHALATION) at 20:41

## 2021-11-13 RX ADMIN — Medication 50 MILLIGRAM(S): at 06:19

## 2021-11-13 RX ADMIN — Medication 81 MILLIGRAM(S): at 13:00

## 2021-11-13 RX ADMIN — CHLORHEXIDINE GLUCONATE 1 APPLICATION(S): 213 SOLUTION TOPICAL at 06:16

## 2021-11-13 RX ADMIN — MAGNESIUM OXIDE 400 MG ORAL TABLET 400 MILLIGRAM(S): 241.3 TABLET ORAL at 08:20

## 2021-11-13 RX ADMIN — Medication 2 GRAM(S): at 17:50

## 2021-11-13 RX ADMIN — MAGNESIUM OXIDE 400 MG ORAL TABLET 400 MILLIGRAM(S): 241.3 TABLET ORAL at 13:01

## 2021-11-13 RX ADMIN — PANTOPRAZOLE SODIUM 40 MILLIGRAM(S): 20 TABLET, DELAYED RELEASE ORAL at 06:18

## 2021-11-13 RX ADMIN — Medication 2 GRAM(S): at 06:16

## 2021-11-13 RX ADMIN — Medication 2: at 13:03

## 2021-11-13 RX ADMIN — MAGNESIUM OXIDE 400 MG ORAL TABLET 400 MILLIGRAM(S): 241.3 TABLET ORAL at 17:50

## 2021-11-13 RX ADMIN — SACUBITRIL AND VALSARTAN 1 TABLET(S): 24; 26 TABLET, FILM COATED ORAL at 17:47

## 2021-11-13 RX ADMIN — BUDESONIDE AND FORMOTEROL FUMARATE DIHYDRATE 2 PUFF(S): 160; 4.5 AEROSOL RESPIRATORY (INHALATION) at 08:19

## 2021-11-13 RX ADMIN — SACUBITRIL AND VALSARTAN 1 TABLET(S): 24; 26 TABLET, FILM COATED ORAL at 06:18

## 2021-11-13 RX ADMIN — ATORVASTATIN CALCIUM 80 MILLIGRAM(S): 80 TABLET, FILM COATED ORAL at 22:01

## 2021-11-13 RX ADMIN — SODIUM CHLORIDE 50 MILLILITER(S): 5 INJECTION, SOLUTION INTRAVENOUS at 18:01

## 2021-11-13 RX ADMIN — SODIUM CHLORIDE 50 MILLILITER(S): 5 INJECTION, SOLUTION INTRAVENOUS at 06:33

## 2021-11-13 RX ADMIN — Medication 1: at 17:45

## 2021-11-13 RX ADMIN — CLOPIDOGREL BISULFATE 75 MILLIGRAM(S): 75 TABLET, FILM COATED ORAL at 13:00

## 2021-11-13 RX ADMIN — Medication 145 MILLIGRAM(S): at 13:01

## 2021-11-13 NOTE — PROGRESS NOTE ADULT - ASSESSMENT
56 year old female with a pmhx of chronic hypoxic respiratory failure (on 4L home O2) secondary to ( HFrEF 35-40%, GIII DD)  with pulmonary hypertension, HTN, HLD, DM2, active smoker , CVA with residual LE weakness (2014, wheelchair bound),  COPD , mood /depression,  presents of worsening shortness of breath and increase weight found to be in HF exacerbation, cardiology consulted for elevated troponin.     IMPRESSION:    ·	NSTEMI  ·	HFREF - decompensated but some improvement with diuresis. not at baseline yet.   ·	CAD sp recent stent RCA 6/’21.   ·	Recurrent SVT-likely atrial tachycardia - prev noted to need ablation in past; amiodarone, bb + cardizem.   ·	Other hx:  HTN, HLD, DM2, active smoker , CVA with residual LE weakness (2014, wheelchair bound),  COPD on 4L o2 at home , mood /depression  ----  Echo:	6/’21:  EF 35-40.  G3dd. mod red rvsf. Mild mod mr. mod sev tr. mild ai. Mod ph (pasp 50/ra 15).   Cath:	6/’21: (angina/abn stress) lad/dg mild. Cx li. Rca m90.  // mid rca zaki x1.   ----  Labs:	trop .02 >. .05 >> .06  >> .28        PLAN  - Breathing improving. CXR improved   - continue asa, plavix   - Completed Heparin protocol  - Started on Bumex drip, metolazone with good diuresis   - C/w metoprolol, Aspirin, Plavix, Entresto, increase b-blocker as tolerated  - tte still pending  - once more euvolemic, likely plan to repeat cardiac cath.      56 year old female with a pmhx of COPD with chronic hypoxic respiratory failure (on 4L home O2), CAD s/p PCI RCA (6/2021), HFrEF 35-40% with mod PHTN, DM2, active smoker, CVA with residual LE weakness (2014, wheelchair bound) presents with acute on chronic HFrEF and NSTEMI.    IMPRESSION:    ·	NSTEMI  ·	HFREF - decompensated but some improvement with diuresis  ·	CAD sp recent stent RCA 6/’21  ·	Recurrent SVT-likely atrial tachycardia - prev noted to need ablation in past  ·	Other hx:  HTN, HLD, DM2, active smoker , CVA with residual LE weakness (2014, wheelchair bound),  COPD on 4L o2 at home  ----  Echo:	6/’21:  EF 35-40.  G3dd. mod red rvsf. Mild mod mr. mod sev tr. mild ai. Mod ph (pasp 50/ra 15).   Cath:	6/’21: (angina/abn stress) lad/dg mild. Cx li. Rca m90.  // mid rca zaki x1.   ----  Labs:	trop .02 >. .05 >> .06  >> .28      PLAN  - C/w Aspirin and Plavix  - C/w Bumex drip and Metolazone   - C/w Entresto and Metoprolol  - Echo still pending  - Likely plan to repeat cardiac cath after adequate diuresis

## 2021-11-13 NOTE — PROGRESS NOTE ADULT - ATTENDING COMMENTS
REVIEW OF SYSTEMS:  CONSTITUTIONAL:  No weakness, fevers, chills, night sweats, weight loss  EYES/ENT: No visual changes. No vertigo or dysphagia  NECK: No neck pain or stiffness  RESPIRATORY: No cough, wheezing, hemoptysis. No shortness of breath  CARDIOVASCULAR: No chest pain or palpitations. No lower extremity edema  GASTROINTESTINAL: No abdominal pain. No nausea, vomiting, diarrhea, or hematemesis  GENITOURINARY: No dysuria or hematuria   NEUROLOGICAL: No focal numbness or weakness  SKIN: No rashes or itching  HEMATOLOGIC: No easy bruising or prolonged bleeding.      PHYSICAL EXAM:  GENERAL: NAD, well-developed, Non-toxic, stated age   HEAD:  Atraumatic, Normocephalic  EYES: EOMI, Sclera White   NECK: Supple, No JVD  CHEST/LUNG: Clear to auscultation bilaterally; No wheezing, rhonchi, or crackles  HEART: Regular rate and rhythm; s1, s2, No murmurs, rubs, or gallops  ABDOMEN: Soft, Nontender, Nondistended; Bowel sounds present, No rebound or guarding noted   EXTREMITIES:  No lower extremity edema or calf tenderness to palpation.  No clubbing or cyanosis  PSYCH: AAOx3, pleasant, cooperative, not anxious  NEUROLOGY: 5/5 strength in all extremities, no downward drift. Sensation grossly intact.   SKIN: No rashes or lesions      ASSESSMENT AND PLAN:  #Acute Hypoxic Respiratory Failure 2/2 acute on chronic HFmEF exacerbation  #Acute NSTEMI   #H/o CAD s/p PCI (6/2021)  - still volume overloaded, cont bumex gtt + hypertonic saline, along w GDMT (entresto, bb), added metolazone today per HF team   - C/w metoprolol, Aspirin, Plavix, Entresto, increase b-blocker as tolerated  - repeat echo pending  - completed Heparin gtt (acs protocol)    - cardio following - will cath when pt clinically euvolemic, most likely next week       #COPD on 4L home O2 / CORNELIUS on CPAP- currently saturating well on NC 4L baseline   #H/o HLD- cont fenofibrate and lipitor   #DM type 2 (last A1c 06/2021 6.8)- monitor FS - add basal bolus insulin if needed  #H/o Mood Disorder- repeat EKG, if QTC <500, restart home dose Seroquel   #Morbid Obesity (BMI 45.8) - counselled about wt loss   #Active Smoker - counseled smoking cessation, diet and exercise  #Vitamin D Deficiency - c/w supplement     Rest as above  Discussed with house staff

## 2021-11-13 NOTE — PROGRESS NOTE ADULT - SUBJECTIVE AND OBJECTIVE BOX
SUBJECTIVE:  Pt is sitting comfortable in bed on supplemental oxygen      VITALS:  T(C): 36.1 (11-13-21 @ 05:59), Max: 36.2 (11-12-21 @ 17:15)  HR: 75 (11-13-21 @ 10:44) (75 - 91)  BP: 133/85 (11-13-21 @ 10:44) (126/76 - 159/96)  RR: 18 (11-13-21 @ 05:59) (18 - 18)  SpO2: 98% (11-12-21 @ 20:30) (98% - 98%)    I&O's Summary    12 Nov 2021 07:01  -  13 Nov 2021 07:00  --------------------------------------------------------  IN: 1275 mL / OUT: 2500 mL / NET: -1225 mL    13 Nov 2021 07:01  -  13 Nov 2021 14:24  --------------------------------------------------------  IN: 345 mL / OUT: 1000 mL / NET: -655 mL        PHYSICAL:  Alert, no distress  Reg, nL s1/s2, no m/r/g  B/L rales  1+ LE edema.    EKG:    TELEMETRY:    CXR:    LABS:                        14.4   5.98  )-----------( 308      ( 13 Nov 2021 12:50 )             45.3     11-13    138  |  92<L>  |  19  ----------------------------<  211<H>  3.7   |  26  |  0.9    Ca    8.6      13 Nov 2021 12:50  Mg     1.3     11-13    TPro  6.6  /  Alb  3.4<L>  /  TBili  1.1  /  DBili  x   /  AST  20  /  ALT  9   /  AlkPhos  90  11-13    PT/INR - ( 12 Nov 2021 07:30 )   PT: 16.20 sec;   INR: 1.41 ratio         PTT - ( 13 Nov 2021 12:50 )  PTT:58.7 sec    MEDICATIONS  (STANDING):  aspirin  chewable 81 milliGRAM(s) Oral daily  atorvastatin 80 milliGRAM(s) Oral at bedtime  budesonide 160 MICROgram(s)/formoterol 4.5 MICROgram(s) Inhaler 2 Puff(s) Inhalation two times a day  buMETAnide Infusion 1 mG/Hr (5 mL/Hr) IV Continuous <Continuous>  chlorhexidine 4% Liquid 1 Application(s) Topical <User Schedule>  clopidogrel Tablet 75 milliGRAM(s) Oral daily  dextrose 40% Gel 15 Gram(s) Oral once  dextrose 5%. 1000 milliLiter(s) (50 mL/Hr) IV Continuous <Continuous>  dextrose 5%. 1000 milliLiter(s) (100 mL/Hr) IV Continuous <Continuous>  dextrose 50% Injectable 25 Gram(s) IV Push once  dextrose 50% Injectable 12.5 Gram(s) IV Push once  dextrose 50% Injectable 25 Gram(s) IV Push once  fenofibrate Tablet 145 milliGRAM(s) Oral daily  glucagon  Injectable 1 milliGRAM(s) IntraMuscular once  insulin lispro (ADMELOG) corrective regimen sliding scale   SubCutaneous three times a day before meals  magnesium oxide 400 milliGRAM(s) Oral three times a day with meals  magnesium sulfate  IVPB 2 Gram(s) IV Intermittent every 12 hours  metolazone 2.5 milliGRAM(s) Oral daily  metoprolol succinate ER 50 milliGRAM(s) Oral daily  omega-3-Acid Ethyl Esters 2 Gram(s) Oral two times a day  pantoprazole    Tablet 40 milliGRAM(s) Oral before breakfast  sacubitril 49 mG/valsartan 51 mG 1 Tablet(s) Oral two times a day  sodium chloride 3%. 150 milliLiter(s) (50 mL/Hr) IV Continuous <Continuous>   SUBJECTIVE:  Pt is sitting comfortable in bed on supplemental oxygen      VITALS:  T(C): 36.1 (11-13-21 @ 05:59), Max: 36.2 (11-12-21 @ 17:15)  HR: 75 (11-13-21 @ 10:44) (75 - 91)  BP: 133/85 (11-13-21 @ 10:44) (126/76 - 159/96)  RR: 18 (11-13-21 @ 05:59) (18 - 18)  SpO2: 98% (11-12-21 @ 20:30) (98% - 98%)    I&O's Summary    12 Nov 2021 07:01  -  13 Nov 2021 07:00  --------------------------------------------------------  IN: 1275 mL / OUT: 2500 mL / NET: -1225 mL    13 Nov 2021 07:01  -  13 Nov 2021 14:24  --------------------------------------------------------  IN: 345 mL / OUT: 1000 mL / NET: -655 mL        PHYSICAL:  Alert, no distress  Reg, nL s1/s2, no m/r/g  B/L rales  1+ LE edema.        LABS:                        14.4   5.98  )-----------( 308      ( 13 Nov 2021 12:50 )             45.3     11-13    138  |  92<L>  |  19  ----------------------------<  211<H>  3.7   |  26  |  0.9    Ca    8.6      13 Nov 2021 12:50  Mg     1.3     11-13    TPro  6.6  /  Alb  3.4<L>  /  TBili  1.1  /  DBili  x   /  AST  20  /  ALT  9   /  AlkPhos  90  11-13    PT/INR - ( 12 Nov 2021 07:30 )   PT: 16.20 sec;   INR: 1.41 ratio    PTT - ( 13 Nov 2021 12:50 )  PTT:58.7 sec      MEDICATIONS  (STANDING):  aspirin  chewable 81 milliGRAM(s) Oral daily  atorvastatin 80 milliGRAM(s) Oral at bedtime  budesonide 160 MICROgram(s)/formoterol 4.5 MICROgram(s) Inhaler 2 Puff(s) Inhalation two times a day  buMETAnide Infusion 1 mG/Hr (5 mL/Hr) IV Continuous <Continuous>  chlorhexidine 4% Liquid 1 Application(s) Topical <User Schedule>  clopidogrel Tablet 75 milliGRAM(s) Oral daily  dextrose 40% Gel 15 Gram(s) Oral once  dextrose 5%. 1000 milliLiter(s) (50 mL/Hr) IV Continuous <Continuous>  dextrose 5%. 1000 milliLiter(s) (100 mL/Hr) IV Continuous <Continuous>  dextrose 50% Injectable 25 Gram(s) IV Push once  dextrose 50% Injectable 12.5 Gram(s) IV Push once  dextrose 50% Injectable 25 Gram(s) IV Push once  fenofibrate Tablet 145 milliGRAM(s) Oral daily  glucagon  Injectable 1 milliGRAM(s) IntraMuscular once  insulin lispro (ADMELOG) corrective regimen sliding scale   SubCutaneous three times a day before meals  magnesium oxide 400 milliGRAM(s) Oral three times a day with meals  magnesium sulfate  IVPB 2 Gram(s) IV Intermittent every 12 hours  metolazone 2.5 milliGRAM(s) Oral daily  metoprolol succinate ER 50 milliGRAM(s) Oral daily  omega-3-Acid Ethyl Esters 2 Gram(s) Oral two times a day  pantoprazole    Tablet 40 milliGRAM(s) Oral before breakfast  sacubitril 49 mG/valsartan 51 mG 1 Tablet(s) Oral two times a day  sodium chloride 3%. 150 milliLiter(s) (50 mL/Hr) IV Continuous <Continuous>

## 2021-11-13 NOTE — PROGRESS NOTE ADULT - ASSESSMENT
56 year old female with a pmhx of chronic hypoxic respiratory failure (on 4L home O2) secondary to ( HFrEF 35-40%, GIII DD)  with pulmonary hypertension, HTN, HLD, DM2, active smoker , CVA with residual LE weakness (2014, wheelchair bound),  COPD , mood /depression,  presents of worsening dyspnea, orthopnea, weight gain concerning for acute hypoxic respiratory failure 2/2 to acute CHF exacerbation.     #Acute Hypoxic Respiratory Failure 2/2 HFmEF exacerbation - no sepsis poa - still volume overloaded  #H/o HTN  - p/w dyspnea, orthopnea, ~25lb recent weight gain; PE w  3+ pitting edema, decr bs and crackles; BNP 7542   - CXR w increased b/l opacities and cardiomegaly; CTA w findings c/w interstitial pulmonary edema w associated small bilateral pleural effusions  - last echo (6/14/21) w EF 35-40% w G3DD & pulm htn; on torsemide at home  - HF team consult appreciated  - cont bumex gtt + hypertonic saline, along w GDMT (entresto, bb)  - added metolazone today since UOP <3; started standing Mg  - repeat echo pending  - monitor daily cxr & bmp bid - maintain k >4, mg >2.1  - strict I&Os, daily weights    #Acute NSTEMI   #H/o CAD s/p PCI (6/2021)  #H/o CVA w residual LE weakness  - Trop 02>>.41, CKMB 24.1 (11/11)  - cont Heparin gtt (acs protocol) - monitor ptts  - c/w dapt (in light of recent stent), statin, bb  - cardio following - will cath when pt clinically euvolemic  - pain control prn    #Tachycardia w h/o recurrent SVT - likely atrial tachycardia  - EKG showing sinus tachy on admission; d-dimer 800  - duplex neg for dvt; cta neg for pe  - seen by EP during admission in june (was started on amiodarone but reportedly never picked up medication after d/c)  - cont bb, holding off on amio for now - monitor on tele - possible reconsult EP   - on heparin gtt    #COPD on 4L home O2  #CORNELIUS on CPAP  - currently saturating well on NC  - cont inhalers and nebs prn  - will inquire about bringing home CPAP to use qHS    #H/o HLD  - cont fenofibrate and lipitor     #DM type 2 (last A1c 06/2021 6.8)  - monitor FS - add basal bolus insulin if needed    #H/o Mood Disorder  - per allscripts, pt alon takes seroquel and was previously prescribed depakote and ativan    #Morbid Obesity (BMI 45.8)  #Active Smoker  - counseled smoking cessation, diet and exercise  - outpatient nutrition follow up    #Vitamin D Deficiency   - cont vit D 50,000 Q weekly     DVT ppx: heparin gtt  GI ppx: ppi   Diet: DASH   Activity: IAT - uses wheelchair    Full code

## 2021-11-13 NOTE — PROGRESS NOTE ADULT - SUBJECTIVE AND OBJECTIVE BOX
SUBJECTIVE:    Patient is a 56y old Female who presents with a chief complaint of worsening dyspnea, orthopnea and weight gain.    Currently admitted to medicine with the primary diagnosis of CHF exacerbation    24 hour update:  Today is hospital day 5d. Patient volume status improving, still on bumex gtt; Trops still elevated; still on heparin gtt; planning on cath with cardio next week.      PAST MEDICAL & SURGICAL HISTORY  Hypertension    Hyperlipidemia    Anxiety and depression    COPD, severe    CHF (congestive heart failure)    Cerebrovascular accident (CVA)  Multiple    Type 2 diabetes mellitus    CKD (chronic kidney disease), stage II    No significant past surgical history      SOCIAL HISTORY:    ALLERGIES:  No Known Allergies    MEDICATIONS:  STANDING MEDICATIONS  aspirin  chewable 81 milliGRAM(s) Oral daily  atorvastatin 80 milliGRAM(s) Oral at bedtime  budesonide 160 MICROgram(s)/formoterol 4.5 MICROgram(s) Inhaler 2 Puff(s) Inhalation two times a day  buMETAnide Infusion 1 mG/Hr IV Continuous <Continuous>  chlorhexidine 4% Liquid 1 Application(s) Topical <User Schedule>  clopidogrel Tablet 75 milliGRAM(s) Oral daily  enoxaparin Injectable 40 milliGRAM(s) SubCutaneous daily  fenofibrate Tablet 145 milliGRAM(s) Oral daily  metoprolol succinate ER 50 milliGRAM(s) Oral daily  omega-3-Acid Ethyl Esters 2 Gram(s) Oral two times a day  pantoprazole    Tablet 40 milliGRAM(s) Oral before breakfast  sacubitril 49 mG/valsartan 51 mG 1 Tablet(s) Oral two times a day  sodium chloride 3%. 150 milliLiter(s) IV Continuous <Continuous>    PRN MEDICATIONS  ALBUTerol    90 MICROgram(s) HFA Inhaler 2 Puff(s) Inhalation every 6 hours PRN    VITALS:   T(C): 36.1 (13 Nov 2021 05:59), Max: 36.2 (12 Nov 2021 17:15)  T(F): 97 (13 Nov 2021 05:59), Max: 97.1 (12 Nov 2021 17:15)  HR: 91 (13 Nov 2021 05:59) (77 - 96)  BP: 159/96 (13 Nov 2021 05:59) (123/66 - 159/96)  BP(mean): 102 (12 Nov 2021 17:15) (102 - 102)  RR: 18 (13 Nov 2021 05:59) (18 - 18)  SpO2: 98% (12 Nov 2021 20:30) (98% - 98%)    PHYSICAL EXAM:  GENERAL: NAD  NERVOUS SYSTEM: AAOx3  CHEST/LUNG: decr bs b/l +crackles  HEART: +s1s2 RRR  ABDOMEN: soft, NT/ND  EXTREMITIES:  3+ pitting edema    LABS:               13.8   5.57  )-----------( 283      ( 12 Nov 2021 07:30 )             43.0     136  |  93<L>  |  17  ----------------------------<  182<H>  4.2   |  24  |  1.0    Ca    8.9      12 Nov 2021 16:05  Mg     1.4     11-12    TPro  6.7  /  Alb  3.4<L>  /  TBili  1.1  /  DBili  x   /  AST  29  /  ALT  11  /  AlkPhos  95  11-12                       PT/INR - ( 12 Nov 2021 07:30 )   PT: 16.20 sec;   INR: 1.41 ratio     PTT - ( 13 Nov 2021 01:03 )  PTT:47.9 sec    CARDIAC MARKERS ( 12 Nov 2021 07:30 )  x     / 0.26 ng/mL / x     / x     / x      CARDIAC MARKERS ( 11 Nov 2021 18:17 )  x     / 0.40 ng/mL / 141 U/L / x     / 8.0 ng/mL  CARDIAC MARKERS ( 11 Nov 2021 11:00 )  x     / 0.38 ng/mL / 156 U/L / x     / 11.7 ng/mL    Serum Pro-Brain Natriuretic Peptide: 7542 pg/mL (11-08-21 @ 11:27)     IMAGING:

## 2021-11-14 LAB
ANION GAP SERPL CALC-SCNC: 20 MMOL/L — HIGH (ref 7–14)
BASOPHILS # BLD AUTO: 0.09 K/UL — SIGNIFICANT CHANGE UP (ref 0–0.2)
BASOPHILS NFR BLD AUTO: 1.1 % — HIGH (ref 0–1)
BUN SERPL-MCNC: 22 MG/DL — HIGH (ref 10–20)
CALCIUM SERPL-MCNC: 9.3 MG/DL — SIGNIFICANT CHANGE UP (ref 8.5–10.1)
CHLORIDE SERPL-SCNC: 84 MMOL/L — LOW (ref 98–110)
CO2 SERPL-SCNC: 31 MMOL/L — SIGNIFICANT CHANGE UP (ref 17–32)
CREAT SERPL-MCNC: 1 MG/DL — SIGNIFICANT CHANGE UP (ref 0.7–1.5)
EOSINOPHIL # BLD AUTO: 0.07 K/UL — SIGNIFICANT CHANGE UP (ref 0–0.7)
EOSINOPHIL NFR BLD AUTO: 0.8 % — SIGNIFICANT CHANGE UP (ref 0–8)
GLUCOSE BLDC GLUCOMTR-MCNC: 125 MG/DL — HIGH (ref 70–99)
GLUCOSE BLDC GLUCOMTR-MCNC: 136 MG/DL — HIGH (ref 70–99)
GLUCOSE BLDC GLUCOMTR-MCNC: 172 MG/DL — HIGH (ref 70–99)
GLUCOSE BLDC GLUCOMTR-MCNC: 179 MG/DL — HIGH (ref 70–99)
GLUCOSE SERPL-MCNC: 142 MG/DL — HIGH (ref 70–99)
HCT VFR BLD CALC: 46.9 % — SIGNIFICANT CHANGE UP (ref 37–47)
HGB BLD-MCNC: 15 G/DL — SIGNIFICANT CHANGE UP (ref 12–16)
IMM GRANULOCYTES NFR BLD AUTO: 0.5 % — HIGH (ref 0.1–0.3)
LYMPHOCYTES # BLD AUTO: 1.48 K/UL — SIGNIFICANT CHANGE UP (ref 1.2–3.4)
LYMPHOCYTES # BLD AUTO: 17.4 % — LOW (ref 20.5–51.1)
MAGNESIUM SERPL-MCNC: 1.4 MG/DL — LOW (ref 1.8–2.4)
MAGNESIUM SERPL-MCNC: 1.5 MG/DL — LOW (ref 1.8–2.4)
MCHC RBC-ENTMCNC: 28.3 PG — SIGNIFICANT CHANGE UP (ref 27–31)
MCHC RBC-ENTMCNC: 32 G/DL — SIGNIFICANT CHANGE UP (ref 32–37)
MCV RBC AUTO: 88.5 FL — SIGNIFICANT CHANGE UP (ref 81–99)
MONOCYTES # BLD AUTO: 0.41 K/UL — SIGNIFICANT CHANGE UP (ref 0.1–0.6)
MONOCYTES NFR BLD AUTO: 4.8 % — SIGNIFICANT CHANGE UP (ref 1.7–9.3)
NEUTROPHILS # BLD AUTO: 6.4 K/UL — SIGNIFICANT CHANGE UP (ref 1.4–6.5)
NEUTROPHILS NFR BLD AUTO: 75.4 % — HIGH (ref 42.2–75.2)
NRBC # BLD: 0 /100 WBCS — SIGNIFICANT CHANGE UP (ref 0–0)
PLATELET # BLD AUTO: 332 K/UL — SIGNIFICANT CHANGE UP (ref 130–400)
POTASSIUM SERPL-MCNC: 3.4 MMOL/L — LOW (ref 3.5–5)
POTASSIUM SERPL-SCNC: 3.4 MMOL/L — LOW (ref 3.5–5)
RBC # BLD: 5.3 M/UL — SIGNIFICANT CHANGE UP (ref 4.2–5.4)
RBC # FLD: 15.9 % — HIGH (ref 11.5–14.5)
SODIUM SERPL-SCNC: 135 MMOL/L — SIGNIFICANT CHANGE UP (ref 135–146)
WBC # BLD: 8.49 K/UL — SIGNIFICANT CHANGE UP (ref 4.8–10.8)
WBC # FLD AUTO: 8.49 K/UL — SIGNIFICANT CHANGE UP (ref 4.8–10.8)

## 2021-11-14 PROCEDURE — 93306 TTE W/DOPPLER COMPLETE: CPT | Mod: 26

## 2021-11-14 PROCEDURE — 71045 X-RAY EXAM CHEST 1 VIEW: CPT | Mod: 26

## 2021-11-14 RX ORDER — POTASSIUM CHLORIDE 20 MEQ
40 PACKET (EA) ORAL EVERY 4 HOURS
Refills: 0 | Status: COMPLETED | OUTPATIENT
Start: 2021-11-14 | End: 2021-11-15

## 2021-11-14 RX ORDER — ENOXAPARIN SODIUM 100 MG/ML
40 INJECTION SUBCUTANEOUS DAILY
Refills: 0 | Status: DISCONTINUED | OUTPATIENT
Start: 2021-11-14 | End: 2021-11-18

## 2021-11-14 RX ORDER — DIPHENHYDRAMINE HCL 50 MG
25 CAPSULE ORAL ONCE
Refills: 0 | Status: COMPLETED | OUTPATIENT
Start: 2021-11-14 | End: 2021-11-14

## 2021-11-14 RX ORDER — MAGNESIUM SULFATE 500 MG/ML
2 VIAL (ML) INJECTION
Refills: 0 | Status: DISCONTINUED | OUTPATIENT
Start: 2021-11-14 | End: 2021-11-14

## 2021-11-14 RX ORDER — MAGNESIUM OXIDE 400 MG ORAL TABLET 241.3 MG
400 TABLET ORAL EVERY 8 HOURS
Refills: 0 | Status: DISCONTINUED | OUTPATIENT
Start: 2021-11-14 | End: 2021-11-16

## 2021-11-14 RX ORDER — MAGNESIUM SULFATE 500 MG/ML
2 VIAL (ML) INJECTION ONCE
Refills: 0 | Status: COMPLETED | OUTPATIENT
Start: 2021-11-14 | End: 2021-11-14

## 2021-11-14 RX ORDER — MAGNESIUM OXIDE 400 MG ORAL TABLET 241.3 MG
400 TABLET ORAL
Refills: 0 | Status: DISCONTINUED | OUTPATIENT
Start: 2021-11-14 | End: 2021-11-14

## 2021-11-14 RX ADMIN — CLOPIDOGREL BISULFATE 75 MILLIGRAM(S): 75 TABLET, FILM COATED ORAL at 12:08

## 2021-11-14 RX ADMIN — Medication 1: at 12:09

## 2021-11-14 RX ADMIN — Medication 50 GRAM(S): at 16:32

## 2021-11-14 RX ADMIN — Medication 40 MILLIEQUIVALENT(S): at 22:15

## 2021-11-14 RX ADMIN — ATORVASTATIN CALCIUM 80 MILLIGRAM(S): 80 TABLET, FILM COATED ORAL at 22:15

## 2021-11-14 RX ADMIN — Medication 50 MILLIGRAM(S): at 06:12

## 2021-11-14 RX ADMIN — MAGNESIUM OXIDE 400 MG ORAL TABLET 400 MILLIGRAM(S): 241.3 TABLET ORAL at 16:50

## 2021-11-14 RX ADMIN — Medication 145 MILLIGRAM(S): at 12:09

## 2021-11-14 RX ADMIN — Medication 2 GRAM(S): at 18:10

## 2021-11-14 RX ADMIN — SACUBITRIL AND VALSARTAN 1 TABLET(S): 24; 26 TABLET, FILM COATED ORAL at 18:10

## 2021-11-14 RX ADMIN — Medication 81 MILLIGRAM(S): at 12:09

## 2021-11-14 RX ADMIN — SACUBITRIL AND VALSARTAN 1 TABLET(S): 24; 26 TABLET, FILM COATED ORAL at 06:12

## 2021-11-14 RX ADMIN — BUDESONIDE AND FORMOTEROL FUMARATE DIHYDRATE 2 PUFF(S): 160; 4.5 AEROSOL RESPIRATORY (INHALATION) at 20:25

## 2021-11-14 RX ADMIN — SODIUM CHLORIDE 50 MILLILITER(S): 5 INJECTION, SOLUTION INTRAVENOUS at 06:14

## 2021-11-14 RX ADMIN — MAGNESIUM OXIDE 400 MG ORAL TABLET 400 MILLIGRAM(S): 241.3 TABLET ORAL at 08:15

## 2021-11-14 RX ADMIN — ENOXAPARIN SODIUM 40 MILLIGRAM(S): 100 INJECTION SUBCUTANEOUS at 16:50

## 2021-11-14 RX ADMIN — MAGNESIUM OXIDE 400 MG ORAL TABLET 400 MILLIGRAM(S): 241.3 TABLET ORAL at 12:09

## 2021-11-14 RX ADMIN — MAGNESIUM OXIDE 400 MG ORAL TABLET 400 MILLIGRAM(S): 241.3 TABLET ORAL at 22:53

## 2021-11-14 RX ADMIN — BUDESONIDE AND FORMOTEROL FUMARATE DIHYDRATE 2 PUFF(S): 160; 4.5 AEROSOL RESPIRATORY (INHALATION) at 08:13

## 2021-11-14 RX ADMIN — SODIUM CHLORIDE 50 MILLILITER(S): 5 INJECTION, SOLUTION INTRAVENOUS at 18:09

## 2021-11-14 RX ADMIN — PANTOPRAZOLE SODIUM 40 MILLIGRAM(S): 20 TABLET, DELAYED RELEASE ORAL at 06:14

## 2021-11-14 RX ADMIN — Medication 16.67 GRAM(S): at 18:40

## 2021-11-14 RX ADMIN — Medication 2 GRAM(S): at 06:13

## 2021-11-14 NOTE — PROGRESS NOTE ADULT - SUBJECTIVE AND OBJECTIVE BOX
SUBJECTIVE:    Patient is a 56y old Female who presents with a chief complaint of CHF (13 Nov 2021 14:23)  Currently admitted to medicine with the primary diagnosis of CHF exacerbation  Today is hospital day 6d. This morning she is resting comfortably in bed and reports no new issues or overnight events.     PAST MEDICAL & SURGICAL HISTORY  Hypertension    Hyperlipidemia    Anxiety and depression    COPD, severe    CHF (congestive heart failure)    Cerebrovascular accident (CVA)  Multiple    Type 2 diabetes mellitus    CKD (chronic kidney disease), stage II    No significant past surgical history      SOCIAL HISTORY:  Negative for smoking/alcohol/drug use.     ALLERGIES:  No Known Allergies    MEDICATIONS:  STANDING MEDICATIONS  aspirin  chewable 81 milliGRAM(s) Oral daily  atorvastatin 80 milliGRAM(s) Oral at bedtime  budesonide 160 MICROgram(s)/formoterol 4.5 MICROgram(s) Inhaler 2 Puff(s) Inhalation two times a day  buMETAnide Infusion 1 mG/Hr IV Continuous <Continuous>  chlorhexidine 4% Liquid 1 Application(s) Topical <User Schedule>  clopidogrel Tablet 75 milliGRAM(s) Oral daily  dextrose 40% Gel 15 Gram(s) Oral once  dextrose 5%. 1000 milliLiter(s) IV Continuous <Continuous>  dextrose 5%. 1000 milliLiter(s) IV Continuous <Continuous>  dextrose 50% Injectable 12.5 Gram(s) IV Push once  dextrose 50% Injectable 25 Gram(s) IV Push once  dextrose 50% Injectable 25 Gram(s) IV Push once  fenofibrate Tablet 145 milliGRAM(s) Oral daily  glucagon  Injectable 1 milliGRAM(s) IntraMuscular once  insulin lispro (ADMELOG) corrective regimen sliding scale   SubCutaneous three times a day before meals  magnesium oxide 400 milliGRAM(s) Oral three times a day with meals  magnesium sulfate  IVPB 2 Gram(s) IV Intermittent every 12 hours  magnesium sulfate  IVPB 2 Gram(s) IV Intermittent once  metolazone 2.5 milliGRAM(s) Oral daily  metoprolol succinate ER 50 milliGRAM(s) Oral daily  omega-3-Acid Ethyl Esters 2 Gram(s) Oral two times a day  pantoprazole    Tablet 40 milliGRAM(s) Oral before breakfast  sacubitril 49 mG/valsartan 51 mG 1 Tablet(s) Oral two times a day  sodium chloride 3%. 150 milliLiter(s) IV Continuous <Continuous>  sodium chloride 3%. 150 milliLiter(s) IV Continuous <Continuous>    PRN MEDICATIONS  ALBUTerol    90 MICROgram(s) HFA Inhaler 2 Puff(s) Inhalation every 6 hours PRN  melatonin 3 milliGRAM(s) Oral at bedtime PRN    VITALS:   T(F): 98.3  HR: 77  BP: 150/101  RR: 18  SpO2: 99%    LABS:                        14.4   5.98  )-----------( 308      ( 13 Nov 2021 12:50 )             45.3     11-13    138  |  92<L>  |  19  ----------------------------<  211<H>  3.7   |  26  |  0.9    Ca    8.6      13 Nov 2021 12:50  Mg     1.5     11-14    TPro  6.6  /  Alb  3.4<L>  /  TBili  1.1  /  DBili  x   /  AST  20  /  ALT  9   /  AlkPhos  90  11-13    PTT - ( 13 Nov 2021 12:50 )  PTT:58.7 sec          CARDIAC MARKERS ( 13 Nov 2021 12:50 )  x     / 0.33 ng/mL / x     / x     / x          RADIOLOGY:    ay< from: Xray Chest 1 View- PORTABLE-Routine (Xray Chest 1 View- PORTABLE-Routine in AM.) (11.14.21 @ 05:46) >  Impression:  Stable bilateral opacities. No definite pneumothorax.        < end of copied text >      PHYSICAL EXAM:  GEN: No acute distress  LUNGS: Clear to auscultation bilaterally   HEART: S1/S2 present. RRR.   ABD: Soft, non-tender, non-distended. Bowel sounds present  EXT: +2 LE edema  NEURO: AAOX3

## 2021-11-14 NOTE — PROGRESS NOTE ADULT - ASSESSMENT
56 year old female with a pmhx of chronic hypoxic respiratory failure (on 4L home O2) secondary to ( HFrEF 35-40%, GIII DD)  with pulmonary hypertension, HTN, HLD, DM2, active smoker , CVA with residual LE weakness (2014, wheelchair bound),  COPD , mood /depression,  presents of worsening dyspnea, orthopnea, weight gain concerning for acute hypoxic respiratory failure 2/2 to acute CHF exacerbation.     #Acute Hypoxic Respiratory Failure 2/2 acute on chronic HFmEF exacerbation  #Acute NSTEMI   #H/o CAD s/p PCI (6/2021)  - still volume overloaded, cont bumex gtt + hypertonic saline, along w GDMT (entresto, bb), added metolazone 11/13 per HF team   - pt is negative 3.6L over past 24 hrs   - C/w metoprolol, Aspirin, Plavix, Entresto, increase b-blocker as tolerated  - repeat echo: EF is 15-20% (down from 35% on 6/2021), grade 2 diastolic dysfunction, severe pulm HTN, moderate MR, multiple wall motion abnormalities   - completed Heparin gtt (acs protocol)    - cardio following - will cath when pt clinically euvolemic, most likely next week    #COPD on 4L home O2 / CORNELIUS on CPAP- currently saturating well on NC 4L baseline   #H/o HLD- cont fenofibrate and lipitor   #DM type 2 (last A1c 06/2021 6.8)- monitor FS - add basal bolus insulin if needed  #H/o Mood Disorder- repeat EKG with , hold off Seroquel supplement with Mg, repeat blood work  #Morbid Obesity (BMI 45.8) - counselled about wt loss   #Active Smoker - counseled smoking cessation, diet and exercise  #Vitamin D Deficiency - c/w supplement     DVT ppx: lovenox  GI ppx: ppi   Diet: DASH   Activity: IAT - uses wheelchair  Full code

## 2021-11-15 LAB
ANION GAP SERPL CALC-SCNC: 18 MMOL/L — HIGH (ref 7–14)
ANION GAP SERPL CALC-SCNC: 20 MMOL/L — HIGH (ref 7–14)
BASOPHILS # BLD AUTO: 0.09 K/UL — SIGNIFICANT CHANGE UP (ref 0–0.2)
BASOPHILS NFR BLD AUTO: 1.1 % — HIGH (ref 0–1)
BUN SERPL-MCNC: 23 MG/DL — HIGH (ref 10–20)
BUN SERPL-MCNC: 24 MG/DL — HIGH (ref 10–20)
CALCIUM SERPL-MCNC: 10 MG/DL — SIGNIFICANT CHANGE UP (ref 8.5–10.1)
CALCIUM SERPL-MCNC: 9.5 MG/DL — SIGNIFICANT CHANGE UP (ref 8.5–10.1)
CHLORIDE SERPL-SCNC: 81 MMOL/L — LOW (ref 98–110)
CHLORIDE SERPL-SCNC: 84 MMOL/L — LOW (ref 98–110)
CO2 SERPL-SCNC: 34 MMOL/L — HIGH (ref 17–32)
CO2 SERPL-SCNC: 35 MMOL/L — HIGH (ref 17–32)
CREAT SERPL-MCNC: 1 MG/DL — SIGNIFICANT CHANGE UP (ref 0.7–1.5)
CREAT SERPL-MCNC: 1.1 MG/DL — SIGNIFICANT CHANGE UP (ref 0.7–1.5)
EOSINOPHIL # BLD AUTO: 0.08 K/UL — SIGNIFICANT CHANGE UP (ref 0–0.7)
EOSINOPHIL NFR BLD AUTO: 0.9 % — SIGNIFICANT CHANGE UP (ref 0–8)
GLUCOSE BLDC GLUCOMTR-MCNC: 126 MG/DL — HIGH (ref 70–99)
GLUCOSE BLDC GLUCOMTR-MCNC: 164 MG/DL — HIGH (ref 70–99)
GLUCOSE BLDC GLUCOMTR-MCNC: 171 MG/DL — HIGH (ref 70–99)
GLUCOSE BLDC GLUCOMTR-MCNC: 175 MG/DL — HIGH (ref 70–99)
GLUCOSE SERPL-MCNC: 117 MG/DL — HIGH (ref 70–99)
GLUCOSE SERPL-MCNC: 155 MG/DL — HIGH (ref 70–99)
HCT VFR BLD CALC: 46.9 % — SIGNIFICANT CHANGE UP (ref 37–47)
HGB BLD-MCNC: 15.1 G/DL — SIGNIFICANT CHANGE UP (ref 12–16)
IMM GRANULOCYTES NFR BLD AUTO: 0.4 % — HIGH (ref 0.1–0.3)
LYMPHOCYTES # BLD AUTO: 1.13 K/UL — LOW (ref 1.2–3.4)
LYMPHOCYTES # BLD AUTO: 13.2 % — LOW (ref 20.5–51.1)
MAGNESIUM SERPL-MCNC: 1.8 MG/DL — SIGNIFICANT CHANGE UP (ref 1.8–2.4)
MAGNESIUM SERPL-MCNC: 1.8 MG/DL — SIGNIFICANT CHANGE UP (ref 1.8–2.4)
MCHC RBC-ENTMCNC: 28.4 PG — SIGNIFICANT CHANGE UP (ref 27–31)
MCHC RBC-ENTMCNC: 32.2 G/DL — SIGNIFICANT CHANGE UP (ref 32–37)
MCV RBC AUTO: 88.2 FL — SIGNIFICANT CHANGE UP (ref 81–99)
MONOCYTES # BLD AUTO: 0.45 K/UL — SIGNIFICANT CHANGE UP (ref 0.1–0.6)
MONOCYTES NFR BLD AUTO: 5.3 % — SIGNIFICANT CHANGE UP (ref 1.7–9.3)
NEUTROPHILS # BLD AUTO: 6.75 K/UL — HIGH (ref 1.4–6.5)
NEUTROPHILS NFR BLD AUTO: 79.1 % — HIGH (ref 42.2–75.2)
NRBC # BLD: 0 /100 WBCS — SIGNIFICANT CHANGE UP (ref 0–0)
PLATELET # BLD AUTO: 318 K/UL — SIGNIFICANT CHANGE UP (ref 130–400)
POTASSIUM SERPL-MCNC: 3.9 MMOL/L — SIGNIFICANT CHANGE UP (ref 3.5–5)
POTASSIUM SERPL-MCNC: 4.1 MMOL/L — SIGNIFICANT CHANGE UP (ref 3.5–5)
POTASSIUM SERPL-SCNC: 3.9 MMOL/L — SIGNIFICANT CHANGE UP (ref 3.5–5)
POTASSIUM SERPL-SCNC: 4.1 MMOL/L — SIGNIFICANT CHANGE UP (ref 3.5–5)
RBC # BLD: 5.32 M/UL — SIGNIFICANT CHANGE UP (ref 4.2–5.4)
RBC # FLD: 15.9 % — HIGH (ref 11.5–14.5)
SODIUM SERPL-SCNC: 135 MMOL/L — SIGNIFICANT CHANGE UP (ref 135–146)
SODIUM SERPL-SCNC: 137 MMOL/L — SIGNIFICANT CHANGE UP (ref 135–146)
WBC # BLD: 8.53 K/UL — SIGNIFICANT CHANGE UP (ref 4.8–10.8)
WBC # FLD AUTO: 8.53 K/UL — SIGNIFICANT CHANGE UP (ref 4.8–10.8)

## 2021-11-15 PROCEDURE — 99232 SBSQ HOSP IP/OBS MODERATE 35: CPT

## 2021-11-15 PROCEDURE — 93010 ELECTROCARDIOGRAM REPORT: CPT

## 2021-11-15 PROCEDURE — 71045 X-RAY EXAM CHEST 1 VIEW: CPT | Mod: 26

## 2021-11-15 PROCEDURE — 99233 SBSQ HOSP IP/OBS HIGH 50: CPT

## 2021-11-15 RX ORDER — MAGNESIUM SULFATE 500 MG/ML
2 VIAL (ML) INJECTION ONCE
Refills: 0 | Status: COMPLETED | OUTPATIENT
Start: 2021-11-15 | End: 2021-11-15

## 2021-11-15 RX ORDER — MAGNESIUM SULFATE 500 MG/ML
1 VIAL (ML) INJECTION
Refills: 0 | Status: DISCONTINUED | OUTPATIENT
Start: 2021-11-15 | End: 2021-11-15

## 2021-11-15 RX ORDER — POTASSIUM CHLORIDE 20 MEQ
20 PACKET (EA) ORAL ONCE
Refills: 0 | Status: COMPLETED | OUTPATIENT
Start: 2021-11-15 | End: 2021-11-15

## 2021-11-15 RX ADMIN — Medication 81 MILLIGRAM(S): at 12:13

## 2021-11-15 RX ADMIN — Medication 2 GRAM(S): at 06:02

## 2021-11-15 RX ADMIN — PANTOPRAZOLE SODIUM 40 MILLIGRAM(S): 20 TABLET, DELAYED RELEASE ORAL at 08:44

## 2021-11-15 RX ADMIN — BUDESONIDE AND FORMOTEROL FUMARATE DIHYDRATE 2 PUFF(S): 160; 4.5 AEROSOL RESPIRATORY (INHALATION) at 08:54

## 2021-11-15 RX ADMIN — Medication 145 MILLIGRAM(S): at 12:14

## 2021-11-15 RX ADMIN — ENOXAPARIN SODIUM 40 MILLIGRAM(S): 100 INJECTION SUBCUTANEOUS at 12:16

## 2021-11-15 RX ADMIN — Medication 1: at 12:17

## 2021-11-15 RX ADMIN — SACUBITRIL AND VALSARTAN 1 TABLET(S): 24; 26 TABLET, FILM COATED ORAL at 18:02

## 2021-11-15 RX ADMIN — CHLORHEXIDINE GLUCONATE 1 APPLICATION(S): 213 SOLUTION TOPICAL at 06:03

## 2021-11-15 RX ADMIN — ATORVASTATIN CALCIUM 80 MILLIGRAM(S): 80 TABLET, FILM COATED ORAL at 22:05

## 2021-11-15 RX ADMIN — CLOPIDOGREL BISULFATE 75 MILLIGRAM(S): 75 TABLET, FILM COATED ORAL at 12:13

## 2021-11-15 RX ADMIN — Medication 2 GRAM(S): at 18:02

## 2021-11-15 RX ADMIN — SACUBITRIL AND VALSARTAN 1 TABLET(S): 24; 26 TABLET, FILM COATED ORAL at 05:55

## 2021-11-15 RX ADMIN — Medication 40 MILLIEQUIVALENT(S): at 03:33

## 2021-11-15 RX ADMIN — Medication 50 MILLIGRAM(S): at 05:55

## 2021-11-15 RX ADMIN — MAGNESIUM OXIDE 400 MG ORAL TABLET 400 MILLIGRAM(S): 241.3 TABLET ORAL at 08:47

## 2021-11-15 RX ADMIN — Medication 20 MILLIEQUIVALENT(S): at 12:14

## 2021-11-15 RX ADMIN — MAGNESIUM OXIDE 400 MG ORAL TABLET 400 MILLIGRAM(S): 241.3 TABLET ORAL at 18:01

## 2021-11-15 RX ADMIN — MAGNESIUM OXIDE 400 MG ORAL TABLET 400 MILLIGRAM(S): 241.3 TABLET ORAL at 15:43

## 2021-11-15 RX ADMIN — Medication 3 MILLIGRAM(S): at 22:05

## 2021-11-15 RX ADMIN — MAGNESIUM OXIDE 400 MG ORAL TABLET 400 MILLIGRAM(S): 241.3 TABLET ORAL at 12:14

## 2021-11-15 RX ADMIN — BUMETANIDE 5 MG/HR: 0.25 INJECTION INTRAMUSCULAR; INTRAVENOUS at 08:47

## 2021-11-15 RX ADMIN — MAGNESIUM OXIDE 400 MG ORAL TABLET 400 MILLIGRAM(S): 241.3 TABLET ORAL at 22:05

## 2021-11-15 RX ADMIN — Medication 1: at 08:44

## 2021-11-15 RX ADMIN — MAGNESIUM OXIDE 400 MG ORAL TABLET 400 MILLIGRAM(S): 241.3 TABLET ORAL at 05:56

## 2021-11-15 NOTE — PROGRESS NOTE ADULT - ASSESSMENT
Acute on chronic systolic HF /Fluid overload /pulmonary HTN    Patient remains fluid overloaded on exam- IVC 2.2cm with some collapsibility   STOP Bumex gtt today at 4pm  STOP 3% Hypertonic Saline   STOP metolazone 2.5 mg daily   Will reassess fluid status tomorrow to determine proper diuresis   Continue Entresto 49-51 mg BID   BMP twice daily   Maintain potassium >4.0, Mg >2.1  Strict intake and output  Daily weight   Plan discussed with primary team  Will continue to follow       **ATTENDING ATTESTATION PENDING** Acute on chronic systolic HF /Fluid overload /pulmonary HTN    Patient remains fluid overloaded on exam- IVC 2.2cm with some collapsibility   STOP Bumex gtt today at 4pm  STOP 3% Hypertonic Saline   STOP metolazone   Will reassess fluid status tomorrow to determine proper diuresis   Continue Entresto 49-51 mg BID   BMP twice daily   Maintain potassium >4.0, Mg >2.1  Strict intake and output  Daily weight   Plan discussed with primary team  Will continue to follow

## 2021-11-15 NOTE — PROGRESS NOTE ADULT - ASSESSMENT
A 56 year old female with a PMH of chronic hypoxic respiratory failure (on 4L home O2) secondary to ( HFrEF 35-40%, GIII DD)  with pulmonary hypertension, HTN, HLD, DM2, active smoker , CVA with residual LE weakness (2014, wheelchair bound),  COPD , mood /depression,  presents of worsening shortness of breath and increase weight with  B/L LE swelling over past week.    Acute on chronic HFrEF  DM-2 / HTN / DL  Chronic hypoxic respiratory failure  Pulmonary HTN  Tobacco use  H/O CVA with residual LE weakness  COPD  Mood disorder / Depression  NSTEMI                PLAN:    ·	Care D/W the HF specialist. D/C all diuretics and monitor the i's and o's  ·	CE trending down now. D/W the cardiology. No further cardiac w/u or cath. Cont current medical management  ·	Cont ASA, Plavix, Lipitor, Metoprolol   ·	A negative balance of 7482 cc over the last 24 hrs  ·	Check i's and o's and daily wt  ·	Low salt diet and water restriction to 1.5 L/D  ·	Daily BMP  ·	Had cath done in June 2021 and RICARDO placed in mid RCA  ·	GDMT. On Metoprolol and Entresto  ·	Cont her other meds  ·	Pt eval and D/C planning    Progress Note Handoff    Pending (specify):  Consults__PT_______, Tests________, Test Results_______, Other___Anticipate D/C in 24-48 hrs____  Family discussion:  Disposition: Home___/SNF___/Other________/Unknown at this time________    Stanley Domingo MD  Spectra: [id number]

## 2021-11-15 NOTE — PROGRESS NOTE ADULT - SUBJECTIVE AND OBJECTIVE BOX
Patient is a 56y old  Female who presents with a chief complaint of CHF (13 Nov 2021 14:23)    HPI:  56 year old female with a pmhx of chronic hypoxic respiratory failure (on 4L home O2) secondary to ( HFrEF 35-40%, GIII DD)  with pulmonary hypertension, HTN, HLD, DM2, active smoker , CVA with residual LE weakness (2014, wheelchair bound),  COPD , mood /depression,  presents of worsening shortness of breath and increase weight with  B/L LE over past week. Associated with increase oxygen use, mild SOB at rest orthopnea, and palpitations. She endorses compliance with home torsemide, as well as fluid restriction and low salt diet. She denies CP.   In ED VITALS: Temp 98.6F, , / 112, RR 18 Spo2 98 on 3L. Her O2 was increased to 5L due to feeling uncomfortable. CXR: increased bilateral opacities. BNP 7542, Troponin 0.02. Given lasix IV in ED. Admitted fort HFrEF exacerbation.  (08 Nov 2021 14:20)      SUBJ:  Patient seen and examined. Events noted.      MEDICATIONS  (STANDING):  aspirin  chewable 81 milliGRAM(s) Oral daily  atorvastatin 80 milliGRAM(s) Oral at bedtime  budesonide 160 MICROgram(s)/formoterol 4.5 MICROgram(s) Inhaler 2 Puff(s) Inhalation two times a day  chlorhexidine 4% Liquid 1 Application(s) Topical <User Schedule>  clopidogrel Tablet 75 milliGRAM(s) Oral daily  dextrose 40% Gel 15 Gram(s) Oral once  dextrose 5%. 1000 milliLiter(s) (50 mL/Hr) IV Continuous <Continuous>  dextrose 5%. 1000 milliLiter(s) (100 mL/Hr) IV Continuous <Continuous>  dextrose 50% Injectable 25 Gram(s) IV Push once  dextrose 50% Injectable 12.5 Gram(s) IV Push once  dextrose 50% Injectable 25 Gram(s) IV Push once  enoxaparin Injectable 40 milliGRAM(s) SubCutaneous daily  fenofibrate Tablet 145 milliGRAM(s) Oral daily  glucagon  Injectable 1 milliGRAM(s) IntraMuscular once  insulin lispro (ADMELOG) corrective regimen sliding scale   SubCutaneous three times a day before meals  magnesium oxide 400 milliGRAM(s) Oral three times a day with meals  magnesium oxide 400 milliGRAM(s) Oral every 8 hours  metolazone 2.5 milliGRAM(s) Oral daily  metoprolol succinate ER 50 milliGRAM(s) Oral daily  omega-3-Acid Ethyl Esters 2 Gram(s) Oral two times a day  pantoprazole    Tablet 40 milliGRAM(s) Oral before breakfast  sacubitril 49 mG/valsartan 51 mG 1 Tablet(s) Oral two times a day    MEDICATIONS  (PRN):  ALBUTerol    90 MICROgram(s) HFA Inhaler 2 Puff(s) Inhalation every 6 hours PRN Shortness of Breath and/or Wheezing  melatonin 3 milliGRAM(s) Oral at bedtime PRN Insomnia            Vital Signs Last 24 Hrs  T(C): 35.8 (15 Nov 2021 12:00), Max: 36.2 (14 Nov 2021 20:09)  T(F): 96.4 (15 Nov 2021 12:00), Max: 97.1 (14 Nov 2021 20:09)  HR: 72 (15 Nov 2021 17:57) (65 - 72)  BP: 124/76 (15 Nov 2021 17:57) (113/69 - 144/88)  BP(mean): --  RR: 18 (15 Nov 2021 12:00) (18 - 18)  SpO2: 100% (15 Nov 2021 15:48) (100% - 100%)      PHYSICAL EXAM:    GEN: AAO x 3, NAD  HEENT: NC/AT, PERRL  Neck: No JVD, no bruits  CV: Reg, S1-S2, no murmur  Lungs: CTAB  Abd: Soft, non-tender  Ext: No edema        11-14-21 @ 07:01  -  11-15-21 @ 07:00  --------------------------------------------------------  IN: 3293 mL / OUT: 43528 mL / NET: -7482 mL    11-15-21 @ 07:01  -  11-15-21 @ 19:05  --------------------------------------------------------  IN: 770 mL / OUT: 3900 mL / NET: -3130 mL        I&O's Summary    14 Nov 2021 07:01  -  15 Nov 2021 07:00  --------------------------------------------------------  IN: 3293 mL / OUT: 09240 mL / NET: -7482 mL    15 Nov 2021 07:01  -  15 Nov 2021 19:05  --------------------------------------------------------  IN: 770 mL / OUT: 3900 mL / NET: -3130 mL    	    TELEMETRY:    ECG:    TTE:      LABS:                        15.1   8.53  )-----------( 318      ( 15 Nov 2021 07:28 )             46.9     11-15    137  |  84<L>  |  23<H>  ----------------------------<  155<H>  3.9   |  35<H>  |  1.0    Ca    9.5      15 Nov 2021 07:28  Mg     1.8     11-15                BNP  RADIOLOGY & ADDITIONAL STUDIES:      IMPRESSION AND PLAN:

## 2021-11-15 NOTE — PROGRESS NOTE ADULT - ASSESSMENT
CHF - c/w management as per HF team  Monitor diuresis  C/w BB, DAPT, statin, Entresto  Will consider cath once adequately diuresed.

## 2021-11-15 NOTE — PROGRESS NOTE ADULT - SUBJECTIVE AND OBJECTIVE BOX
WILLIAN MARY  56y Female    CHIEF COMPLAINT:    Patient is a 56y old  Female who presents with a chief complaint of CHF (2021 14:23)      INTERVAL HPI/OVERNIGHT EVENTS:    Patient seen and examined. Denies sob on O2. Feels better. On 4L NC. No palpitations.     ROS: All other systems are negative.    Vital Signs:    T(F): 96.4 (11-15-21 @ 12:00), Max: 97.1 (21 @ 20:09)  HR: 67 (11-15-21 @ 12:00) (65 - 69)  BP: 113/69 (11-15-21 @ 12:00) (113/69 - 144/88)  RR: 18 (11-15-21 @ 12:00) (18 - 18)  SpO2: --  I&O's Summary    2021 07:01  -  15 Nov 2021 07:00  --------------------------------------------------------  IN: 3293 mL / OUT: 65681 mL / NET: -7482 mL    15 Nov 2021 07:01  -  15 Nov 2021 15:37  --------------------------------------------------------  IN: 770 mL / OUT: 3900 mL / NET: -3130 mL      Daily     Daily Weight in k (15 Nov 2021 04:00)  CAPILLARY BLOOD GLUCOSE      POCT Blood Glucose.: 171 mg/dL (15 Nov 2021 11:45)  POCT Blood Glucose.: 164 mg/dL (15 Nov 2021 07:59)  POCT Blood Glucose.: 172 mg/dL (2021 21:10)  POCT Blood Glucose.: 136 mg/dL (2021 16:38)      PHYSICAL EXAM:    GENERAL:  NAD  SKIN: No rashes or lesions  HENT: Atraumatic. Normocephalic. PERRL. Moist membranes.  NECK: Supple, No JVD. No lymphadenopathy.  PULMONARY: CTA B/L. No wheezing. No rales  CVS: Normal S1, S2. Rate and Rhythm are regular. No murmurs.  ABDOMEN/GI: Soft, Nontender, Nondistended; BS present  EXTREMITIES: Peripheral pulses intact. Trace edema B/L LE.  NEUROLOGIC:  No motor or sensory deficit.  PSYCH: Alert & oriented x 3    Consultant(s) Notes Reviewed:  [x ] YES  [ ] NO  Care Discussed with Consultants/Other Providers [ x] YES  [ ] NO    EKG reviewed  Telemetry reviewed    LABS:                        15.1   8.53  )-----------( 318      ( 15 Nov 2021 07:28 )             46.9     11-15    137  |  84<L>  |  23<H>  ----------------------------<  155<H>  3.9   |  35<H>  |  1.0    Ca    9.5      15 Nov 2021 07:28  Mg     1.8     -15          Trop 0.33, CKMB --, CK --, 21 @ 12:50        RADIOLOGY & ADDITIONAL TESTS:      Imaging or report Personally Reviewed:  [ ] YES  [ ] NO    Medications:  Standing  aspirin  chewable 81 milliGRAM(s) Oral daily  atorvastatin 80 milliGRAM(s) Oral at bedtime  budesonide 160 MICROgram(s)/formoterol 4.5 MICROgram(s) Inhaler 2 Puff(s) Inhalation two times a day  chlorhexidine 4% Liquid 1 Application(s) Topical <User Schedule>  clopidogrel Tablet 75 milliGRAM(s) Oral daily  dextrose 40% Gel 15 Gram(s) Oral once  dextrose 5%. 1000 milliLiter(s) IV Continuous <Continuous>  dextrose 5%. 1000 milliLiter(s) IV Continuous <Continuous>  dextrose 50% Injectable 25 Gram(s) IV Push once  dextrose 50% Injectable 12.5 Gram(s) IV Push once  dextrose 50% Injectable 25 Gram(s) IV Push once  enoxaparin Injectable 40 milliGRAM(s) SubCutaneous daily  fenofibrate Tablet 145 milliGRAM(s) Oral daily  glucagon  Injectable 1 milliGRAM(s) IntraMuscular once  insulin lispro (ADMELOG) corrective regimen sliding scale   SubCutaneous three times a day before meals  magnesium oxide 400 milliGRAM(s) Oral three times a day with meals  magnesium oxide 400 milliGRAM(s) Oral every 8 hours  metolazone 2.5 milliGRAM(s) Oral daily  metoprolol succinate ER 50 milliGRAM(s) Oral daily  omega-3-Acid Ethyl Esters 2 Gram(s) Oral two times a day  pantoprazole    Tablet 40 milliGRAM(s) Oral before breakfast  sacubitril 49 mG/valsartan 51 mG 1 Tablet(s) Oral two times a day    PRN Meds  ALBUTerol    90 MICROgram(s) HFA Inhaler 2 Puff(s) Inhalation every 6 hours PRN  melatonin 3 milliGRAM(s) Oral at bedtime PRN      Case discussed with resident    Care discussed with pt/family

## 2021-11-15 NOTE — PROGRESS NOTE ADULT - SUBJECTIVE AND OBJECTIVE BOX
WILLIAN MARY 56y Female  MRN#: [id number]   CODE STATUS:________    Hospital Day: 7d    Pt is currently admitted with the primary diagnosis of     SUBJECTIVE  Hospital Course  Patient is a 56y old Female who presents with a chief complaint of CHF (13 Nov 2021 14:23)  Currently admitted to medicine with the primary diagnosis of CHF exacerbation  This morning she is resting comfortably in bed and reports no new issues or overnight events.  Patient reports improvement of her edema & shortness of breath    Overnight events     Subjective complaints     Present Today:   - Wilkerson:  No [  ], Yes [   ] : Indication:     - Type of IV Access:       .. CVC/Piccline:  No [  ], Yes [   ] : Indication:       .. Midline: No [  ], Yes [   ] : Indication:                                              OBJECTIVE  PAST MEDICAL & SURGICAL HISTORY  Hypertension    Hyperlipidemia    Anxiety and depression    COPD, severe    CHF (congestive heart failure)    Cerebrovascular accident (CVA)  Multiple    Type 2 diabetes mellitus    CKD (chronic kidney disease), stage II    No significant past surgical history                                                ALLERGIES:  No Known Allergies                           HOME MEDICATIONS  Home Medications:  Albuterol (Eqv-ProAir HFA) 90 mcg/inh inhalation aerosol: 2 puff(s) inhaled every 6 hours, As Needed (07 Jun 2021 11:22)  aspirin 81 mg oral tablet: 1 tab(s) orally once a day (07 Jun 2021 11:22)  fenofibrate 145 mg oral tablet: 1 tab(s) orally once a day (07 Jun 2021 11:22)  glipiZIDE 10 mg oral tablet: 1 tab(s) orally 2 times a day (07 Jun 2021 11:22)  Lovaza 1000 mg oral capsule: 2 cap(s) orally 2 times a day (08 Nov 2021 14:52)  metFORMIN 1000 mg oral tablet: 1 tab(s) orally 2 times a day Do not take until 6/23 (22 Jun 2021 12:58)  Plavix 75 mg oral tablet: 1 tab(s) orally once a day (07 Jun 2021 11:22)  Vitamin D2 1.25 mg (50,000 intl units) oral capsule: 1 cap(s) orally once a week (08 Nov 2021 14:52)                           MEDICATIONS:  STANDING MEDICATIONS  aspirin  chewable 81 milliGRAM(s) Oral daily  atorvastatin 80 milliGRAM(s) Oral at bedtime  budesonide 160 MICROgram(s)/formoterol 4.5 MICROgram(s) Inhaler 2 Puff(s) Inhalation two times a day  chlorhexidine 4% Liquid 1 Application(s) Topical <User Schedule>  clopidogrel Tablet 75 milliGRAM(s) Oral daily  dextrose 40% Gel 15 Gram(s) Oral once  dextrose 5%. 1000 milliLiter(s) IV Continuous <Continuous>  dextrose 5%. 1000 milliLiter(s) IV Continuous <Continuous>  dextrose 50% Injectable 25 Gram(s) IV Push once  dextrose 50% Injectable 12.5 Gram(s) IV Push once  dextrose 50% Injectable 25 Gram(s) IV Push once  enoxaparin Injectable 40 milliGRAM(s) SubCutaneous daily  fenofibrate Tablet 145 milliGRAM(s) Oral daily  glucagon  Injectable 1 milliGRAM(s) IntraMuscular once  insulin lispro (ADMELOG) corrective regimen sliding scale   SubCutaneous three times a day before meals  magnesium oxide 400 milliGRAM(s) Oral three times a day with meals  magnesium oxide 400 milliGRAM(s) Oral every 8 hours  metolazone 2.5 milliGRAM(s) Oral daily  metoprolol succinate ER 50 milliGRAM(s) Oral daily  omega-3-Acid Ethyl Esters 2 Gram(s) Oral two times a day  pantoprazole    Tablet 40 milliGRAM(s) Oral before breakfast  sacubitril 49 mG/valsartan 51 mG 1 Tablet(s) Oral two times a day    PRN MEDICATIONS  ALBUTerol    90 MICROgram(s) HFA Inhaler 2 Puff(s) Inhalation every 6 hours PRN  melatonin 3 milliGRAM(s) Oral at bedtime PRN                                            ------------------------------------------------------------  VITAL SIGNS: Last 24 Hours  T(C): 35.8 (15 Nov 2021 12:00), Max: 36.2 (14 Nov 2021 20:09)  T(F): 96.4 (15 Nov 2021 12:00), Max: 97.1 (14 Nov 2021 20:09)  HR: 67 (15 Nov 2021 12:00) (65 - 69)  BP: 113/69 (15 Nov 2021 12:00) (113/69 - 144/88)  BP(mean): --  RR: 18 (15 Nov 2021 12:00) (18 - 18)  SpO2: --      11-14-21 @ 07:01  -  11-15-21 @ 07:00  --------------------------------------------------------  IN: 3293 mL / OUT: 97466 mL / NET: -7482 mL    11-15-21 @ 07:01  -  11-15-21 @ 15:11  --------------------------------------------------------  IN: 770 mL / OUT: 3900 mL / NET: -3130 mL                                               LABS:                        15.1   8.53  )-----------( 318      ( 15 Nov 2021 07:28 )             46.9     11-15    137  |  84<L>  |  23<H>  ----------------------------<  155<H>  3.9   |  35<H>  |  1.0    Ca    9.5      15 Nov 2021 07:28  Mg     1.8     11-15                                                                RADIOLOGY:        PHYSICAL EXAM:  GENERAL: NAD, lying in bed comfortably  HEAD:  Atraumatic, Normocephalic  EYES: conjunctiva and sclera clear  ENT: Moist mucous membranes  NECK: No JVD  CHEST/LUNG: Clear to auscultation bilaterally; No rales, rhonchi, wheezing, or rubs. Unlabored respirations, on NC  HEART: Regular rate and rhythm; No murmurs, rubs, or gallops  ABDOMEN: BSx4; Soft, nontender, nondistended  EXTREMITIES:  2+ LE edema  NERVOUS SYSTEM:  A&Ox3

## 2021-11-15 NOTE — PROGRESS NOTE ADULT - SUBJECTIVE AND OBJECTIVE BOX
Date of Admission: 21    Interval History:    - Patient resting in bed, stated she is feeling better  - States breathing has improved   - Remains fluid overloaded, but improving- responding very well to IV diuretics        HISTORY OF PRESENT ILLNESS:     56 year old female with a pmhx of chronic hypoxic respiratory failure (on 4L home O2) secondary to ( HFrEF 35-40%, GIII DD)  with pulmonary hypertension, HTN, HLD, DM2, active smoker , CVA with residual LE weakness (2014, wheelchair bound),  COPD , mood /depression,  presents of worsening shortness of breath and increase weight with  B/L LE over past week. Associated with increase oxygen use, mild SOB at rest orthopnea, and palpitations. She endorses compliance with home torsemide, as well as fluid restriction and low salt diet. She denies CP.     Patient resting in bed, c/o generalized pain and abdominal discomfort, reports for couple of days she had worsening SOB, and was feeling bloated. Patient is not very active however she was SOB with minimal exertion. Patient denies c/p, palpitations, headaches, bleeding, LH, dizziness, orthopnea, PND, LOC, cough. Endorses compliance with medications and follows a low sodium diet       PAST MEDICAL & SURGICAL HISTORY:      Hypertension  Hyperlipidemia  Anxiety and depression  COPD, severe  CHF (congestive heart failure)  Cerebrovascular accident (CVA)Multiple  Type 2 diabetes mellitus  CKD (chronic kidney disease), stage II  No significant past surgical history        FAMILY HISTORY:  No pertinent family history of premature cardiovascular disease in first degree relatives.  Mother:  of cancer at 65  Father:  of an overdose at 50    SOCIAL HISTORY:      Current every day smoker, 2 cigarettes a day, denies alcohol or drug use    Allergies    No Known Allergies    Intolerances      PHYSICAL EXAM:  General Appearance: well appearing, normal for age and gender. 	  Neck: unable to assess due to body habitus JVP, no bruit.   Cardiovascular: regular rate and rhythm S1 S2, , No murmurs, Generalized edema  Respiratory: B/L expiratory wheezing  Psychiatry: Alert and oriented x 3, Mood & affect appropriate  Gastrointestinal:  Soft, Non-tender  Skin/Integumentary : No rashes, No ecchymoses, No cyanosis	  Neurologic: Non-focal  Musculoskeletal/ extremities: Normal range of motion, No clubbing, cyanosis   Vascular: Peripheral pulses palpable 2+ bilaterally    CARDIAC MARKERS:    Serum Pro-Brain Natriuretic Peptide: 7542 pg/mL (11.08.21 @ 11:27)      TELEMETRY EVENTS: 	    EC/8/21    Ventricular Rate 72 BPM  Atrial Rate 72 BPM  P-R Interval 192 ms  QRS Duration 162 ms  Q-T Interval 498 ms  QTC Calculation(Bazett) 545 ms  P Axis 35 degrees  R Axis -88 degrees  T Axis 82 degrees  Diagnosis Line Normal sinus rhythm  Possible Left atrial enlargement  Left axis deviation  Non-specific intra-ventricular conduction block  Abnormal ECG  Confirmed by CARLOTA GAUTHIER MD ([id number]) on 2021 8:55:01 AM      PREVIOUS DIAGNOSTIC TESTING:      TTE 21    Summary:   1. Left ventricular ejection fraction, by visual estimation, is 35 to 40%.   2.Moderately decreased global left ventricular systolic function.   3. Multiple left ventricular regional wall motion abnormalities exist. See wall motion findings.   4. Elevated left atrial and left ventricular end-diastolic pressures.   5. Mild concentric left ventricular hypertrophy.   6. Mildly increased LV wall thickness.   7. Normal left ventricular internal cavity size.   8. Spectral Doppler shows restrictive pattern of left ventricular myocardial filling (Grade III diastolic dysfunction).  9. Moderately reduced RV systolic function.  10. Mildly enlarged left atrium.  11. Moderately enlarged right atrium.  12. Small pericardial effusion.  13. Mild to moderate mitral valve regurgitation.  14. Moderate-severe tricuspid regurgitation.  15.Mild aortic regurgitation.  16. Sclerotic aortic valve with normal opening.  17. Estimated pulmonary artery systolic pressure is 50.0 mmHg assuming a right atrial pressure of 15 mmHg, which is consistent with moderate pulmonary hypertension.  18. Pulmonary hypertension is present.  19. LA volume Index is 36.7 ml/m² ml/m2.    	    Home Medications:  Albuterol (Eqv-ProAir HFA) 90 mcg/inh inhalation aerosol: 2 puff(s) inhaled every 6 hours, As Needed (2021 11:22)  aspirin 81 mg oral tablet: 1 tab(s) orally once a day (2021 11:22)  fenofibrate 145 mg oral tablet: 1 tab(s) orally once a day (2021 11:22)  glipiZIDE 10 mg oral tablet: 1 tab(s) orally 2 times a day (2021 11:22)  Lovaza 1000 mg oral capsule: 2 cap(s) orally 2 times a day (2021 14:52)  metFORMIN 1000 mg oral tablet: 1 tab(s) orally 2 times a day Do not take until  (2021 12:58)  Plavix 75 mg oral tablet: 1 tab(s) orally once a day (2021 11:22)  Vitamin D2 1.25 mg (50,000 intl units) oral capsule: 1 cap(s) orally once a week (2021 14:52)    MEDICATIONS  (STANDING):  aspirin  chewable 81 milliGRAM(s) Oral daily  atorvastatin 80 milliGRAM(s) Oral at bedtime  budesonide 160 MICROgram(s)/formoterol 4.5 MICROgram(s) Inhaler 2 Puff(s) Inhalation two times a day  chlorhexidine 4% Liquid 1 Application(s) Topical <User Schedule>  clopidogrel Tablet 75 milliGRAM(s) Oral daily  enoxaparin Injectable 40 milliGRAM(s) SubCutaneous daily  fenofibrate Tablet 145 milliGRAM(s) Oral daily  furosemide   Injectable 40 milliGRAM(s) IV Push two times a day  magnesium sulfate  IVPB 2 Gram(s) IV Intermittent once  metoprolol succinate ER 50 milliGRAM(s) Oral daily  omega-3-Acid Ethyl Esters 2 Gram(s) Oral two times a day  pantoprazole    Tablet 40 milliGRAM(s) Oral before breakfast  sacubitril 49 mG/valsartan 51 mG 1 Tablet(s) Oral two times a day    MEDICATIONS  (PRN):  ALBUTerol    90 MICROgram(s) HFA Inhaler 2 Puff(s) Inhalation every 6 hours PRN Shortness of Breath and/or Wheezing

## 2021-11-15 NOTE — PROGRESS NOTE ADULT - ASSESSMENT
ASSESSMENT & PLAN  56 year old female with a pmhx of chronic hypoxic respiratory failure (on 4L home O2) secondary to ( HFrEF 35-40%, GIII DD)  with pulmonary hypertension, HTN, HLD, DM2, active smoker , CVA with residual LE weakness (2014, wheelchair bound),  COPD , mood /depression,  presents of worsening dyspnea, orthopnea, weight gain concerning for acute hypoxic respiratory failure 2/2 to acute CHF exacerbation. CHF exacerbation likely due to NSTEMI, pending cath. Patient is hemodynamically stable    #Acute Hypoxic Respiratory Failure 2/2 acute on chronic HFmEF exacerbation  #Acute NSTEMI   #H/o CAD s/p PCI (6/2021)  - improved volume overloaded, hold bumex gtt + hypertonic saline, BMP + Mg bid (target K > 4.0 & Mg > 2.1)  GDMT (entresto, bb), added metolazone 11/13 per HF team   - pt is negative 6.6L over past 24 hrs   - C/w metoprolol, Aspirin, Plavix, Entresto, increase b-blocker as tolerated  - repeat echo: EF is 15-20% (down from 35% on 6/2021), grade 2 diastolic dysfunction, severe pulm HTN, moderate MR, multiple wall motion abnormalities   - completed Heparin gtt (acs protocol)    - cardio following - will cath when pt clinically euvolemic, most likely this week    #COPD on 4L home O2 / CORNELIUS on CPAP- currently saturating well on NC 4L baseline   # QTc prolongation -> Hold seroquel, do not give ambien  #H/o HLD- cont fenofibrate and lipitor   #DM type 2 (last A1c 06/2021 6.8)- monitor FS - add basal bolus insulin if needed  #H/o Mood Disorder- repeat EKG with , hold off Seroquel supplement with Mg, repeat blood work  #Morbid Obesity (BMI 45.8) - counselled about wt loss   #Active Smoker - counseled smoking cessation, diet and exercise  #Vitamin D Deficiency - c/w supplement   # Recurrent SVT -> needs ablation                                                                              ----------------------------------------------------  # DVT prophylaxis lovenox    # GI prophylaxis protonix    # Diet consistent carb dash/tlc    # Activity Score (AM-PAC)    # Code status     # Disposition                                                                              --------------------------------------------------------    João Regalado

## 2021-11-16 ENCOUNTER — TRANSCRIPTION ENCOUNTER (OUTPATIENT)
Age: 56
End: 2021-11-16

## 2021-11-16 LAB
ALBUMIN SERPL ELPH-MCNC: 3.6 G/DL — SIGNIFICANT CHANGE UP (ref 3.5–5.2)
ALP SERPL-CCNC: 101 U/L — SIGNIFICANT CHANGE UP (ref 30–115)
ALT FLD-CCNC: 10 U/L — SIGNIFICANT CHANGE UP (ref 0–41)
ANION GAP SERPL CALC-SCNC: 16 MMOL/L — HIGH (ref 7–14)
ANION GAP SERPL CALC-SCNC: 18 MMOL/L — HIGH (ref 7–14)
AST SERPL-CCNC: 18 U/L — SIGNIFICANT CHANGE UP (ref 0–41)
BASOPHILS # BLD AUTO: 0.09 K/UL — SIGNIFICANT CHANGE UP (ref 0–0.2)
BASOPHILS NFR BLD AUTO: 1 % — SIGNIFICANT CHANGE UP (ref 0–1)
BILIRUB SERPL-MCNC: 1.2 MG/DL — SIGNIFICANT CHANGE UP (ref 0.2–1.2)
BUN SERPL-MCNC: 26 MG/DL — HIGH (ref 10–20)
BUN SERPL-MCNC: 28 MG/DL — HIGH (ref 10–20)
CALCIUM SERPL-MCNC: 9.4 MG/DL — SIGNIFICANT CHANGE UP (ref 8.5–10.1)
CALCIUM SERPL-MCNC: 9.6 MG/DL — SIGNIFICANT CHANGE UP (ref 8.5–10.1)
CHLORIDE SERPL-SCNC: 82 MMOL/L — LOW (ref 98–110)
CHLORIDE SERPL-SCNC: 82 MMOL/L — LOW (ref 98–110)
CO2 SERPL-SCNC: 32 MMOL/L — SIGNIFICANT CHANGE UP (ref 17–32)
CO2 SERPL-SCNC: 36 MMOL/L — HIGH (ref 17–32)
CREAT SERPL-MCNC: 1 MG/DL — SIGNIFICANT CHANGE UP (ref 0.7–1.5)
CREAT SERPL-MCNC: 1.1 MG/DL — SIGNIFICANT CHANGE UP (ref 0.7–1.5)
EOSINOPHIL # BLD AUTO: 0.07 K/UL — SIGNIFICANT CHANGE UP (ref 0–0.7)
EOSINOPHIL NFR BLD AUTO: 0.7 % — SIGNIFICANT CHANGE UP (ref 0–8)
GLUCOSE BLDC GLUCOMTR-MCNC: 143 MG/DL — HIGH (ref 70–99)
GLUCOSE BLDC GLUCOMTR-MCNC: 149 MG/DL — HIGH (ref 70–99)
GLUCOSE BLDC GLUCOMTR-MCNC: 159 MG/DL — HIGH (ref 70–99)
GLUCOSE BLDC GLUCOMTR-MCNC: 179 MG/DL — HIGH (ref 70–99)
GLUCOSE SERPL-MCNC: 151 MG/DL — HIGH (ref 70–99)
GLUCOSE SERPL-MCNC: 167 MG/DL — HIGH (ref 70–99)
HCT VFR BLD CALC: 49.4 % — HIGH (ref 37–47)
HGB BLD-MCNC: 15.8 G/DL — SIGNIFICANT CHANGE UP (ref 12–16)
IMM GRANULOCYTES NFR BLD AUTO: 0.4 % — HIGH (ref 0.1–0.3)
LYMPHOCYTES # BLD AUTO: 1.29 K/UL — SIGNIFICANT CHANGE UP (ref 1.2–3.4)
LYMPHOCYTES # BLD AUTO: 13.6 % — LOW (ref 20.5–51.1)
MAGNESIUM SERPL-MCNC: 1.8 MG/DL — SIGNIFICANT CHANGE UP (ref 1.8–2.4)
MAGNESIUM SERPL-MCNC: 2 MG/DL — SIGNIFICANT CHANGE UP (ref 1.8–2.4)
MCHC RBC-ENTMCNC: 28.4 PG — SIGNIFICANT CHANGE UP (ref 27–31)
MCHC RBC-ENTMCNC: 32 G/DL — SIGNIFICANT CHANGE UP (ref 32–37)
MCV RBC AUTO: 88.7 FL — SIGNIFICANT CHANGE UP (ref 81–99)
MONOCYTES # BLD AUTO: 0.62 K/UL — HIGH (ref 0.1–0.6)
MONOCYTES NFR BLD AUTO: 6.5 % — SIGNIFICANT CHANGE UP (ref 1.7–9.3)
NEUTROPHILS # BLD AUTO: 7.36 K/UL — HIGH (ref 1.4–6.5)
NEUTROPHILS NFR BLD AUTO: 77.8 % — HIGH (ref 42.2–75.2)
NRBC # BLD: 0 /100 WBCS — SIGNIFICANT CHANGE UP (ref 0–0)
PLATELET # BLD AUTO: 305 K/UL — SIGNIFICANT CHANGE UP (ref 130–400)
POTASSIUM SERPL-MCNC: 3.5 MMOL/L — SIGNIFICANT CHANGE UP (ref 3.5–5)
POTASSIUM SERPL-MCNC: 4.6 MMOL/L — SIGNIFICANT CHANGE UP (ref 3.5–5)
POTASSIUM SERPL-SCNC: 3.5 MMOL/L — SIGNIFICANT CHANGE UP (ref 3.5–5)
POTASSIUM SERPL-SCNC: 4.6 MMOL/L — SIGNIFICANT CHANGE UP (ref 3.5–5)
PROT SERPL-MCNC: 6.9 G/DL — SIGNIFICANT CHANGE UP (ref 6–8)
RBC # BLD: 5.57 M/UL — HIGH (ref 4.2–5.4)
RBC # FLD: 15.9 % — HIGH (ref 11.5–14.5)
SODIUM SERPL-SCNC: 132 MMOL/L — LOW (ref 135–146)
SODIUM SERPL-SCNC: 134 MMOL/L — LOW (ref 135–146)
WBC # BLD: 9.47 K/UL — SIGNIFICANT CHANGE UP (ref 4.8–10.8)
WBC # FLD AUTO: 9.47 K/UL — SIGNIFICANT CHANGE UP (ref 4.8–10.8)

## 2021-11-16 PROCEDURE — 93010 ELECTROCARDIOGRAM REPORT: CPT

## 2021-11-16 PROCEDURE — 71045 X-RAY EXAM CHEST 1 VIEW: CPT | Mod: 26

## 2021-11-16 PROCEDURE — 99232 SBSQ HOSP IP/OBS MODERATE 35: CPT

## 2021-11-16 PROCEDURE — 93971 EXTREMITY STUDY: CPT | Mod: 26,RT

## 2021-11-16 RX ORDER — DAPAGLIFLOZIN 10 MG/1
1 TABLET, FILM COATED ORAL
Qty: 30 | Refills: 0
Start: 2021-11-16 | End: 2021-12-15

## 2021-11-16 RX ORDER — SPIRONOLACTONE 25 MG/1
25 TABLET, FILM COATED ORAL DAILY
Refills: 0 | Status: DISCONTINUED | OUTPATIENT
Start: 2021-11-16 | End: 2021-11-18

## 2021-11-16 RX ORDER — SPIRONOLACTONE 25 MG/1
1 TABLET, FILM COATED ORAL
Qty: 30 | Refills: 3
Start: 2021-11-16 | End: 2022-03-15

## 2021-11-16 RX ORDER — QUETIAPINE FUMARATE 200 MG/1
1 TABLET, FILM COATED ORAL
Qty: 0 | Refills: 0 | DISCHARGE
Start: 2021-11-16

## 2021-11-16 RX ORDER — POTASSIUM CHLORIDE 20 MEQ
20 PACKET (EA) ORAL ONCE
Refills: 0 | Status: COMPLETED | OUTPATIENT
Start: 2021-11-16 | End: 2021-11-16

## 2021-11-16 RX ORDER — POTASSIUM CHLORIDE 20 MEQ
40 PACKET (EA) ORAL EVERY 4 HOURS
Refills: 0 | Status: COMPLETED | OUTPATIENT
Start: 2021-11-16 | End: 2021-11-16

## 2021-11-16 RX ORDER — POTASSIUM CHLORIDE 20 MEQ
40 PACKET (EA) ORAL ONCE
Refills: 0 | Status: COMPLETED | OUTPATIENT
Start: 2021-11-16 | End: 2021-11-16

## 2021-11-16 RX ORDER — MAGNESIUM OXIDE 400 MG ORAL TABLET 241.3 MG
1 TABLET ORAL
Qty: 0 | Refills: 0 | DISCHARGE
Start: 2021-11-16

## 2021-11-16 RX ORDER — QUETIAPINE FUMARATE 200 MG/1
25 TABLET, FILM COATED ORAL AT BEDTIME
Refills: 0 | Status: DISCONTINUED | OUTPATIENT
Start: 2021-11-16 | End: 2021-11-18

## 2021-11-16 RX ORDER — QUETIAPINE FUMARATE 200 MG/1
25 TABLET, FILM COATED ORAL AT BEDTIME
Refills: 0 | Status: DISCONTINUED | OUTPATIENT
Start: 2021-11-16 | End: 2021-11-16

## 2021-11-16 RX ORDER — POTASSIUM CHLORIDE 20 MEQ
40 PACKET (EA) ORAL ONCE
Refills: 0 | Status: DISCONTINUED | OUTPATIENT
Start: 2021-11-16 | End: 2021-11-16

## 2021-11-16 RX ORDER — PANTOPRAZOLE SODIUM 20 MG/1
1 TABLET, DELAYED RELEASE ORAL
Qty: 0 | Refills: 0 | DISCHARGE
Start: 2021-11-16

## 2021-11-16 RX ADMIN — BUDESONIDE AND FORMOTEROL FUMARATE DIHYDRATE 2 PUFF(S): 160; 4.5 AEROSOL RESPIRATORY (INHALATION) at 08:42

## 2021-11-16 RX ADMIN — QUETIAPINE FUMARATE 25 MILLIGRAM(S): 200 TABLET, FILM COATED ORAL at 18:17

## 2021-11-16 RX ADMIN — ENOXAPARIN SODIUM 40 MILLIGRAM(S): 100 INJECTION SUBCUTANEOUS at 12:54

## 2021-11-16 RX ADMIN — MAGNESIUM OXIDE 400 MG ORAL TABLET 400 MILLIGRAM(S): 241.3 TABLET ORAL at 08:45

## 2021-11-16 RX ADMIN — Medication 145 MILLIGRAM(S): at 12:53

## 2021-11-16 RX ADMIN — SACUBITRIL AND VALSARTAN 1 TABLET(S): 24; 26 TABLET, FILM COATED ORAL at 05:28

## 2021-11-16 RX ADMIN — ATORVASTATIN CALCIUM 80 MILLIGRAM(S): 80 TABLET, FILM COATED ORAL at 21:52

## 2021-11-16 RX ADMIN — Medication 40 MILLIEQUIVALENT(S): at 12:52

## 2021-11-16 RX ADMIN — Medication 2 GRAM(S): at 18:19

## 2021-11-16 RX ADMIN — PANTOPRAZOLE SODIUM 40 MILLIGRAM(S): 20 TABLET, DELAYED RELEASE ORAL at 06:13

## 2021-11-16 RX ADMIN — BUDESONIDE AND FORMOTEROL FUMARATE DIHYDRATE 2 PUFF(S): 160; 4.5 AEROSOL RESPIRATORY (INHALATION) at 21:52

## 2021-11-16 RX ADMIN — MAGNESIUM OXIDE 400 MG ORAL TABLET 400 MILLIGRAM(S): 241.3 TABLET ORAL at 12:53

## 2021-11-16 RX ADMIN — CLOPIDOGREL BISULFATE 75 MILLIGRAM(S): 75 TABLET, FILM COATED ORAL at 12:53

## 2021-11-16 RX ADMIN — Medication 81 MILLIGRAM(S): at 12:54

## 2021-11-16 RX ADMIN — Medication 2 GRAM(S): at 05:28

## 2021-11-16 RX ADMIN — Medication 1: at 08:45

## 2021-11-16 RX ADMIN — Medication 40 MILLIEQUIVALENT(S): at 20:52

## 2021-11-16 RX ADMIN — MAGNESIUM OXIDE 400 MG ORAL TABLET 400 MILLIGRAM(S): 241.3 TABLET ORAL at 05:28

## 2021-11-16 RX ADMIN — CHLORHEXIDINE GLUCONATE 1 APPLICATION(S): 213 SOLUTION TOPICAL at 05:26

## 2021-11-16 RX ADMIN — Medication 50 MILLIGRAM(S): at 05:27

## 2021-11-16 RX ADMIN — Medication 40 MILLIEQUIVALENT(S): at 18:18

## 2021-11-16 RX ADMIN — SACUBITRIL AND VALSARTAN 1 TABLET(S): 24; 26 TABLET, FILM COATED ORAL at 18:16

## 2021-11-16 NOTE — PHYSICAL THERAPY INITIAL EVALUATION ADULT - PERTINENT HX OF CURRENT PROBLEM, REHAB EVAL
56 year old female with a pmhx of chronic hypoxic respiratory failure (on 4L home O2) secondary to ( HFrEF 35-40%, GIII DD)  with pulmonary hypertension, HTN, HLD, DM2, active smoker , CVA with residual LE weakness (2014, wheelchair bound),  COPD , mood /depression,  presents of worsening shortness of breath and increase weight with  B/L LE over past week.

## 2021-11-16 NOTE — DISCHARGE NOTE PROVIDER - NSDCCPCAREPLAN_GEN_ALL_CORE_FT
PRINCIPAL DISCHARGE DIAGNOSIS  Diagnosis: CHF exacerbation  Assessment and Plan of Treatment: Congestive Heart Failure (CHF)  Congestive heart failure is a chronic condition in which the heart has trouble pumping blood. In some cases of heart failure, fluid may back up into your lungs or you may have swelling (edema) in your lower legs. There are many causes of heart failure including high blood pressure, coronary artery disease, abnormal heart valves, heart muscle disease, lung disease, diabetes, etc. Symptoms include shortness of breath with activity or when lying flat, cough, swelling of the legs, fatigue, or increased urination during the night.   Treatment is aimed at managing the symptoms of heart failure and may include lifestyle changes, medications, or surgical procedures. Take medicines only as directed by your health care provider and do not stop unless instructed to do so. Eat heart-healthy foods with low or no trans/saturated fats, cholesterol and salt. Weigh yourself every day for early recognition of fluid accumulation.  SEEK IMMEDIATE MEDICAL CARE IF YOU HAVE ANY OF THE FOLLOWING SYMPTOMS: shortness of breath, change in mental status, chest pain, lightheadedness/dizziness/fainting, or worsening of symptoms including not being able to conduct normal physical activity.

## 2021-11-16 NOTE — PHYSICAL THERAPY INITIAL EVALUATION ADULT - ADDITIONAL COMMENTS
Prior to hospitalization, Patient in wheelchair bound, non-ambulatory. She was assisted in ADL's, by HHA that stays with her.

## 2021-11-16 NOTE — DISCHARGE NOTE PROVIDER - CARE PROVIDER_API CALL
[facility]  INTERNAL MEDICINE  [address] [place] [address]  Phone: [phone]  Fax: [phone]  Follow Up Time:     Asad Buitrago (MD)  Cardiovascular Disease; Internal Medicine; Interventional Cardiology  [address] [place] [address], [facility] [address]  [place] [address]  Phone: [phone]  Fax: [phone]  Follow Up Time:     Joel Gaspar (MD; MD)  Adv Heart Fail Trnsplnt Cardio; Cardiovascular Disease; Internal Medicine  [address] [place] [address], [address] [place] [address]  Phone: [phone]  Fax: [phone]  Follow Up Time:

## 2021-11-16 NOTE — DISCHARGE NOTE PROVIDER - PROVIDER TOKENS
PROVIDER:[TOKEN:[[id number]]],PROVIDER:[TOKEN:[[id number]]],PROVIDER:[TOKEN:[[id number]]]

## 2021-11-16 NOTE — DISCHARGE NOTE PROVIDER - NSDCMRMEDTOKEN_GEN_ALL_CORE_FT
Albuterol (Eqv-ProAir HFA) 90 mcg/inh inhalation aerosol: 2 puff(s) inhaled every 6 hours, As Needed  aspirin 81 mg oral tablet: 1 tab(s) orally once a day  atorvastatin 80 mg oral tablet: 1 tab(s) orally once a day (at bedtime)  budesonide-formoterol 160 mcg-4.5 mcg/inh inhalation aerosol: 2 puff(s) inhaled 2 times a day   dapagliflozin 10 mg oral tablet: 1 tab(s) orally once a day   Entresto 49 mg-51 mg oral tablet: 1 tab(s) orally 2 times a day   fenofibrate 145 mg oral tablet: 1 tab(s) orally once a day  glipiZIDE 10 mg oral tablet: 1 tab(s) orally 2 times a day  Lovaza 1000 mg oral capsule: 2 cap(s) orally 2 times a day  magnesium oxide 400 mg oral tablet: 1 tab(s) orally 3 times a day (with meals)  metFORMIN 1000 mg oral tablet: 1 tab(s) orally 2 times a day Do not take until 6/23  Metoprolol Succinate ER 50 mg oral tablet, extended release: 1 tab(s) orally once a day   pantoprazole 40 mg oral delayed release tablet: 1 tab(s) orally once a day (before a meal)  Plavix 75 mg oral tablet: 1 tab(s) orally once a day  QUEtiapine 25 mg oral tablet: 1 tab(s) orally once a day (at bedtime)  spironolactone 25 mg oral tablet: 1 tab(s) orally once a day   torsemide 10 mg oral tablet: 1 tab(s) orally once a day   Vitamin D2 1.25 mg (50,000 intl units) oral capsule: 1 cap(s) orally once a week

## 2021-11-16 NOTE — PROGRESS NOTE ADULT - SUBJECTIVE AND OBJECTIVE BOX
Date of Admission: 21    Interval History:    - Pa        HISTORY OF PRESENT ILLNESS:     56 year old female with a pmhx of chronic hypoxic respiratory failure (on 4L home O2) secondary to ( HFrEF 35-40%, GIII DD)  with pulmonary hypertension, HTN, HLD, DM2, active smoker , CVA with residual LE weakness (2014, wheelchair bound),  COPD , mood /depression,  presents of worsening shortness of breath and increase weight with  B/L LE over past week. Associated with increase oxygen use, mild SOB at rest orthopnea, and palpitations. She endorses compliance with home torsemide, as well as fluid restriction and low salt diet. She denies CP.     Patient resting in bed, c/o generalized pain and abdominal discomfort, reports for couple of days she had worsening SOB, and was feeling bloated. Patient is not very active however she was SOB with minimal exertion. Patient denies c/p, palpitations, headaches, bleeding, LH, dizziness, orthopnea, PND, LOC, cough. Endorses compliance with medications and follows a low sodium diet       PAST MEDICAL & SURGICAL HISTORY:      Hypertension  Hyperlipidemia  Anxiety and depression  COPD, severe  CHF (congestive heart failure)  Cerebrovascular accident (CVA)Multiple  Type 2 diabetes mellitus  CKD (chronic kidney disease), stage II  No significant past surgical history        FAMILY HISTORY:  No pertinent family history of premature cardiovascular disease in first degree relatives.  Mother:  of cancer at 65  Father:  of an overdose at 50    SOCIAL HISTORY:      Current every day smoker, 2 cigarettes a day, denies alcohol or drug use    Allergies    No Known Allergies    Intolerances      PHYSICAL EXAM:  General Appearance: well appearing, normal for age and gender. 	  Neck: unable to assess due to body habitus JVP, no bruit.   Cardiovascular: regular rate and rhythm S1 S2, , No murmur  Respiratory: B/L expiratory wheezing  Psychiatry: Alert and oriented x 3, Mood & affect appropriate  Gastrointestinal:  Soft, Non-tender  Skin/Integumentary : No rashes, No ecchymoses, No cyanosis	  Neurologic: Non-focal  Musculoskeletal/ extremities: Normal range of motion, No clubbing, cyanosis   Vascular: Peripheral pulses palpable 2+ bilaterally    CARDIAC MARKERS:    Serum Pro-Brain Natriuretic Peptide: 7542 pg/mL (21 @ 11:27)      TELEMETRY EVENTS: 	    EC/8/21    Ventricular Rate 72 BPM  Atrial Rate 72 BPM  P-R Interval 192 ms  QRS Duration 162 ms  Q-T Interval 498 ms  QTC Calculation(Bazett) 545 ms  P Axis 35 degrees  R Axis -88 degrees  T Axis 82 degrees  Diagnosis Line Normal sinus rhythm  Possible Left atrial enlargement  Left axis deviation  Non-specific intra-ventricular conduction block  Abnormal ECG  Confirmed by CARLOTA GAUTHIER MD ([id number]) on 2021 8:55:01 AM      PREVIOUS DIAGNOSTIC TESTING:      TTE 21    Summary:   1. Left ventricular ejection fraction, by visual estimation, is 35 to 40%.   2.Moderately decreased global left ventricular systolic function.   3. Multiple left ventricular regional wall motion abnormalities exist. See wall motion findings.   4. Elevated left atrial and left ventricular end-diastolic pressures.   5. Mild concentric left ventricular hypertrophy.   6. Mildly increased LV wall thickness.   7. Normal left ventricular internal cavity size.   8. Spectral Doppler shows restrictive pattern of left ventricular myocardial filling (Grade III diastolic dysfunction).  9. Moderately reduced RV systolic function.  10. Mildly enlarged left atrium.  11. Moderately enlarged right atrium.  12. Small pericardial effusion.  13. Mild to moderate mitral valve regurgitation.  14. Moderate-severe tricuspid regurgitation.  15.Mild aortic regurgitation.  16. Sclerotic aortic valve with normal opening.  17. Estimated pulmonary artery systolic pressure is 50.0 mmHg assuming a right atrial pressure of 15 mmHg, which is consistent with moderate pulmonary hypertension.  18. Pulmonary hypertension is present.  19. LA volume Index is 36.7 ml/m² ml/m2.    	    Home Medications:  Albuterol (Eqv-ProAir HFA) 90 mcg/inh inhalation aerosol: 2 puff(s) inhaled every 6 hours, As Needed (2021 11:22)  aspirin 81 mg oral tablet: 1 tab(s) orally once a day (2021 11:22)  fenofibrate 145 mg oral tablet: 1 tab(s) orally once a day (2021 11:22)  glipiZIDE 10 mg oral tablet: 1 tab(s) orally 2 times a day (2021 11:22)  Lovaza 1000 mg oral capsule: 2 cap(s) orally 2 times a day (2021 14:52)  metFORMIN 1000 mg oral tablet: 1 tab(s) orally 2 times a day Do not take until  (2021 12:58)  Plavix 75 mg oral tablet: 1 tab(s) orally once a day (2021 11:22)  Vitamin D2 1.25 mg (50,000 intl units) oral capsule: 1 cap(s) orally once a week (2021 14:52)    MEDICATIONS  (STANDING):  aspirin  chewable 81 milliGRAM(s) Oral daily  atorvastatin 80 milliGRAM(s) Oral at bedtime  budesonide 160 MICROgram(s)/formoterol 4.5 MICROgram(s) Inhaler 2 Puff(s) Inhalation two times a day  chlorhexidine 4% Liquid 1 Application(s) Topical <User Schedule>  clopidogrel Tablet 75 milliGRAM(s) Oral daily  enoxaparin Injectable 40 milliGRAM(s) SubCutaneous daily  fenofibrate Tablet 145 milliGRAM(s) Oral daily  furosemide   Injectable 40 milliGRAM(s) IV Push two times a day  magnesium sulfate  IVPB 2 Gram(s) IV Intermittent once  metoprolol succinate ER 50 milliGRAM(s) Oral daily  omega-3-Acid Ethyl Esters 2 Gram(s) Oral two times a day  pantoprazole    Tablet 40 milliGRAM(s) Oral before breakfast  sacubitril 49 mG/valsartan 51 mG 1 Tablet(s) Oral two times a day    MEDICATIONS  (PRN):  ALBUTerol    90 MICROgram(s) HFA Inhaler 2 Puff(s) Inhalation every 6 hours PRN Shortness of Breath and/or Wheezing         Date of Admission: 21    Interval History:    - Patient lying in bed in NAD. Euvolemic on exam. She hasn't slept in more than 3 days, asking for Seroquel.         HISTORY OF PRESENT ILLNESS:     56 year old female with a pmhx of chronic hypoxic respiratory failure (on 4L home O2) secondary to ( HFrEF 35-40%, GIII DD)  with pulmonary hypertension, HTN, HLD, DM2, active smoker , CVA with residual LE weakness (2014, wheelchair bound),  COPD , mood /depression,  presents of worsening shortness of breath and increase weight with  B/L LE over past week. Associated with increase oxygen use, mild SOB at rest orthopnea, and palpitations. She endorses compliance with home torsemide, as well as fluid restriction and low salt diet. She denies CP.     Patient resting in bed, c/o generalized pain and abdominal discomfort, reports for couple of days she had worsening SOB, and was feeling bloated. Patient is not very active however she was SOB with minimal exertion. Patient denies c/p, palpitations, headaches, bleeding, LH, dizziness, orthopnea, PND, LOC, cough. Endorses compliance with medications and follows a low sodium diet       PAST MEDICAL & SURGICAL HISTORY:      Hypertension  Hyperlipidemia  Anxiety and depression  COPD, severe  CHF (congestive heart failure)  Cerebrovascular accident (CVA)Multiple  Type 2 diabetes mellitus  CKD (chronic kidney disease), stage II  No significant past surgical history        FAMILY HISTORY:  No pertinent family history of premature cardiovascular disease in first degree relatives.  Mother:  of cancer at 65  Father:  of an overdose at 50    SOCIAL HISTORY:      Current every day smoker, 2 cigarettes a day, denies alcohol or drug use    Allergies    No Known Allergies    Intolerances      PHYSICAL EXAM:  General Appearance: well appearing, normal for age and gender. 	  Neck: unable to assess due to body habitus JVP, no bruit.   Cardiovascular: regular rate and rhythm S1 S2, , No murmur  Respiratory: B/L expiratory wheezing  Psychiatry: Alert and oriented x 3, Mood & affect appropriate  Gastrointestinal:  Soft, Non-tender  Skin/Integumentary : No rashes, No ecchymoses, No cyanosis	  Neurologic: Non-focal  Musculoskeletal/ extremities: Normal range of motion, No clubbing, cyanosis   Vascular: Peripheral pulses palpable 2+ bilaterally    CARDIAC MARKERS:    Serum Pro-Brain Natriuretic Peptide: 7542 pg/mL (11.08.21 @ 11:27)      TELEMETRY EVENTS: 	    EC/8/21    Ventricular Rate 72 BPM  Atrial Rate 72 BPM  P-R Interval 192 ms  QRS Duration 162 ms  Q-T Interval 498 ms  QTC Calculation(Bazett) 545 ms  P Axis 35 degrees  R Axis -88 degrees  T Axis 82 degrees  Diagnosis Line Normal sinus rhythm  Possible Left atrial enlargement  Left axis deviation  Non-specific intra-ventricular conduction block  Abnormal ECG  Confirmed by CARLOTA GAUTHIER MD ([id number]) on 2021 8:55:01 AM      PREVIOUS DIAGNOSTIC TESTING:      TTE 21    Summary:   1. Left ventricular ejection fraction, by visual estimation, is 35 to 40%.   2.Moderately decreased global left ventricular systolic function.   3. Multiple left ventricular regional wall motion abnormalities exist. See wall motion findings.   4. Elevated left atrial and left ventricular end-diastolic pressures.   5. Mild concentric left ventricular hypertrophy.   6. Mildly increased LV wall thickness.   7. Normal left ventricular internal cavity size.   8. Spectral Doppler shows restrictive pattern of left ventricular myocardial filling (Grade III diastolic dysfunction).  9. Moderately reduced RV systolic function.  10. Mildly enlarged left atrium.  11. Moderately enlarged right atrium.  12. Small pericardial effusion.  13. Mild to moderate mitral valve regurgitation.  14. Moderate-severe tricuspid regurgitation.  15.Mild aortic regurgitation.  16. Sclerotic aortic valve with normal opening.  17. Estimated pulmonary artery systolic pressure is 50.0 mmHg assuming a right atrial pressure of 15 mmHg, which is consistent with moderate pulmonary hypertension.  18. Pulmonary hypertension is present.  19. LA volume Index is 36.7 ml/m² ml/m2.    	    Home Medications:  Albuterol (Eqv-ProAir HFA) 90 mcg/inh inhalation aerosol: 2 puff(s) inhaled every 6 hours, As Needed (2021 11:22)  aspirin 81 mg oral tablet: 1 tab(s) orally once a day (2021 11:22)  fenofibrate 145 mg oral tablet: 1 tab(s) orally once a day (2021 11:22)  glipiZIDE 10 mg oral tablet: 1 tab(s) orally 2 times a day (2021 11:22)  Lovaza 1000 mg oral capsule: 2 cap(s) orally 2 times a day (2021 14:52)  metFORMIN 1000 mg oral tablet: 1 tab(s) orally 2 times a day Do not take until  (2021 12:58)  Plavix 75 mg oral tablet: 1 tab(s) orally once a day (2021 11:22)  Vitamin D2 1.25 mg (50,000 intl units) oral capsule: 1 cap(s) orally once a week (2021 14:52)    MEDICATIONS  (STANDING):  aspirin  chewable 81 milliGRAM(s) Oral daily  atorvastatin 80 milliGRAM(s) Oral at bedtime  budesonide 160 MICROgram(s)/formoterol 4.5 MICROgram(s) Inhaler 2 Puff(s) Inhalation two times a day  chlorhexidine 4% Liquid 1 Application(s) Topical <User Schedule>  clopidogrel Tablet 75 milliGRAM(s) Oral daily  enoxaparin Injectable 40 milliGRAM(s) SubCutaneous daily  fenofibrate Tablet 145 milliGRAM(s) Oral daily  furosemide   Injectable 40 milliGRAM(s) IV Push two times a day  magnesium sulfate  IVPB 2 Gram(s) IV Intermittent once  metoprolol succinate ER 50 milliGRAM(s) Oral daily  omega-3-Acid Ethyl Esters 2 Gram(s) Oral two times a day  pantoprazole    Tablet 40 milliGRAM(s) Oral before breakfast  sacubitril 49 mG/valsartan 51 mG 1 Tablet(s) Oral two times a day    MEDICATIONS  (PRN):  ALBUTerol    90 MICROgram(s) HFA Inhaler 2 Puff(s) Inhalation every 6 hours PRN Shortness of Breath and/or Wheezing

## 2021-11-16 NOTE — PROGRESS NOTE ADULT - SUBJECTIVE AND OBJECTIVE BOX
WILLIAN MARY  56y Female    CHIEF COMPLAINT:    Patient is a 56y old  Female who presents with a chief complaint of CHF (15 Nov 2021 19:05)      INTERVAL HPI/OVERNIGHT EVENTS:    Patient seen and examined. Feels good. No sob. On 4L NC. Euvolemic.     ROS: All other systems are negative.    Vital Signs:    T(F): 98.1 (21 @ 04:07), Max: 98.1 (21 @ 04:07)  HR: 69 (21 @ 04:07) (67 - 73)  BP: 108/69 (21 @ 04:07) (108/69 - 128/75)  RR: 18 (21 @ 04:07) (18 - 18)  SpO2: 100% (21 @ 04:07) (100% - 100%)  I&O's Summary    15 Nov 2021 07:  -  2021 07:00  --------------------------------------------------------  IN: 1753 mL / OUT: 8800 mL / NET: -7047 mL    2021 07:01  -  2021 11:57  --------------------------------------------------------  IN: 320 mL / OUT: 900 mL / NET: -580 mL      Daily     Daily Weight in k.7 (2021 04:07)  CAPILLARY BLOOD GLUCOSE      POCT Blood Glucose.: 143 mg/dL (2021 11:46)  POCT Blood Glucose.: 179 mg/dL (2021 07:41)  POCT Blood Glucose.: 175 mg/dL (15 Nov 2021 21:38)  POCT Blood Glucose.: 126 mg/dL (15 Nov 2021 16:41)      PHYSICAL EXAM:    GENERAL:  NAD  SKIN: No rashes or lesions  HENT: Atraumatic. Normocephalic. PERRL. Moist membranes.  NECK: Supple, No JVD. No lymphadenopathy.  PULMONARY: CTA B/L. No wheezing. No rales  CVS: Normal S1, S2. Rate and Rhythm are regular. No murmurs.  ABDOMEN/GI: Soft, Nontender, Nondistended; BS present  EXTREMITIES: Peripheral pulses intact. No edema B/L LE.  NEUROLOGIC:  No motor or sensory deficit.  PSYCH: Alert & oriented x 3    Consultant(s) Notes Reviewed:  [x ] YES  [ ] NO  Care Discussed with Consultants/Other Providers [ x] YES  [ ] NO    EKG reviewed  Telemetry reviewed    LABS:                        15.8   9.47  )-----------( 305      ( 2021 04:30 )             49.4     11-16    134<L>  |  82<L>  |  26<H>  ----------------------------<  167<H>  3.5   |  36<H>  |  1.0    Ca    9.6      2021 04:30  Mg     1.8     11-16    TPro  6.9  /  Alb  3.6  /  TBili  1.2  /  DBili  x   /  AST  18  /  ALT  10  /  AlkPhos  101  11-16        Trop 0.33, CKMB --, CK --, - @ 12:50        RADIOLOGY & ADDITIONAL TESTS:    < from: TTE Echo Complete w/o Contrast w/ Doppler (21 @ 08:13) >    Summary:   1. Left ventricular ejection fraction, by visual estimation, is 15-20%.   2. Severely decreased global left ventricular systolic function.   3. Multiple left ventricular regional wall motion abnormalities exist. See wall motion findings.   4. Mild concentric left ventricular hypertrophy.   5. Spectral Doppler shows pseudonormal pattern of left ventricular myocardial filling (Grade II diastolic dysfunction).   6. Mildly enlarged left atrium.   7. Mildly enlarged right atrium.   8. Sclerotic aortic valve with normal opening.   9. Mild aortic regurgitation.  10. The mitral valve leaflets are tethered due to reduced systolic function and elevated LVDP.  11. Moderate mitral regurgitation.  12. Mild-to-moderate tricuspid regurgitation.  13. Estimated pulmonary artery systolic pressure is 60.2 mmHg assuming a right atrial pressure of 15 mmHg, which is consistent with severe pulmonary hypertension.  14. Small pericardial effusion adjacent to the right atrium.    < end of copied text >    Imaging or report Personally Reviewed:  [ ] YES  [ ] NO    Medications:  Standing  aspirin  chewable 81 milliGRAM(s) Oral daily  atorvastatin 80 milliGRAM(s) Oral at bedtime  budesonide 160 MICROgram(s)/formoterol 4.5 MICROgram(s) Inhaler 2 Puff(s) Inhalation two times a day  chlorhexidine 4% Liquid 1 Application(s) Topical <User Schedule>  clopidogrel Tablet 75 milliGRAM(s) Oral daily  dextrose 40% Gel 15 Gram(s) Oral once  dextrose 5%. 1000 milliLiter(s) IV Continuous <Continuous>  dextrose 5%. 1000 milliLiter(s) IV Continuous <Continuous>  dextrose 50% Injectable 25 Gram(s) IV Push once  dextrose 50% Injectable 25 Gram(s) IV Push once  dextrose 50% Injectable 12.5 Gram(s) IV Push once  enoxaparin Injectable 40 milliGRAM(s) SubCutaneous daily  fenofibrate Tablet 145 milliGRAM(s) Oral daily  glucagon  Injectable 1 milliGRAM(s) IntraMuscular once  insulin lispro (ADMELOG) corrective regimen sliding scale   SubCutaneous three times a day before meals  magnesium oxide 400 milliGRAM(s) Oral three times a day with meals  magnesium oxide 400 milliGRAM(s) Oral every 8 hours  metolazone 2.5 milliGRAM(s) Oral daily  metoprolol succinate ER 50 milliGRAM(s) Oral daily  omega-3-Acid Ethyl Esters 2 Gram(s) Oral two times a day  pantoprazole    Tablet 40 milliGRAM(s) Oral before breakfast  potassium chloride   Powder 40 milliEquivalent(s) Oral every 4 hours  potassium chloride  20 mEq/100 mL IVPB 20 milliEquivalent(s) IV Intermittent once  QUEtiapine 25 milliGRAM(s) Oral at bedtime  sacubitril 49 mG/valsartan 51 mG 1 Tablet(s) Oral two times a day    PRN Meds  ALBUTerol    90 MICROgram(s) HFA Inhaler 2 Puff(s) Inhalation every 6 hours PRN  melatonin 3 milliGRAM(s) Oral at bedtime PRN      Case discussed with resident    Care discussed with pt/family

## 2021-11-16 NOTE — PROGRESS NOTE ADULT - ASSESSMENT
A 56 year old female with a PMH of chronic hypoxic respiratory failure (on 4L home O2) secondary to ( HFrEF 35-40%, GIII DD)  with pulmonary hypertension, HTN, HLD, DM2, active smoker , CVA with residual LE weakness (2014, wheelchair bound),  COPD , mood /depression,  presents of worsening shortness of breath and increase weight with  B/L LE swelling over past week.    Acute on chronic HFrEF  DM-2 / HTN / DL  Chronic hypoxic respiratory failure  Pulmonary HTN  Tobacco use  H/O CVA with residual LE weakness  COPD  Mood disorder / Depression  NSTEMI                PLAN:    ·	In negative balance of 7047 over the last 24 hrs.   ·	Care D/W the HF specialist. Recommended to start her on Torsemide 10 mg po daily, Spironolactone 25 mg po daily and Dapagliflozin 10 mg po daily  ·	Repeat ECHO showed EF is 15-20% as compared to the ECHO done in June, which had EF of 35-40%  ·	CE trending down now. D/W the cardiology. No further cardiac w/u or cath. Cont current medical management  ·	Cont ASA, Plavix, Lipitor, Metoprolol   ·	Check i's and o's and daily wt  ·	Low salt diet and water restriction to 1.5 L/D  ·	Daily BMP  ·	Had cath done in June 2021 and RICARDO placed in mid RCA  ·	GDMT. On Metoprolol and Entresto. Will start Spironolactone 25 mg po daily   ·	Cont her other meds  ·	D/C planning    * Med rec reviewed. Plan of care D/W the pt. Time spent 40 minutes.     Progress Note Handoff    Pending (specify):  Consults________, Tests________, Test Results_______, Other___Social services for D/C planning____  Family discussion:  Disposition: Home___/SNF___/Other________/Unknown at this time________    Stanley Domingo MD  Spectra: [phone]

## 2021-11-16 NOTE — DISCHARGE NOTE PROVIDER - CARE PROVIDERS DIRECT ADDRESSES
[email or web address]

## 2021-11-16 NOTE — PROGRESS NOTE ADULT - ASSESSMENT
Acute on chronic systolic HF /Fluid overload /pulmonary HTN    Patient is close to euvolemic on exam  Patient can be d/c home from Heart Failure stand point will follow up as outpatient   Start Torsemide 10 mg daily, take an extra tablet if a weight gain of 2 lbs or more in one day.   Start Spironolactone 25 mg daily  Start Farxiga 10 mg daily as outpatient   STOP metolazone   Continue Entresto 49-51 mg BID   BMP twice daily   Maintain potassium >4.0, Mg >2.1  Strict intake and output  Daily weight   Plan discussed with primary team  Will continue to follow        Acute on chronic systolic HF /Fluid overload /pulmonary HTN    Patient is euvolemic on exam  Patient can be d/c home from Heart Failure stand point will follow up as outpatient   Start Torsemide 10 mg daily, take an extra tablet if a weight gain of 2 lbs or more in one day.   Start Spironolactone 25 mg daily  Start Farxiga 10 mg daily as outpatient   STOP metolazone   Continue Entresto 49-51 mg BID   BMP twice daily   Maintain potassium >4.0, Mg >2.1  Strict intake and output  Daily weight   Plan discussed with primary team  Will continue to follow

## 2021-11-16 NOTE — DISCHARGE NOTE PROVIDER - HOSPITAL COURSE
56 year old female with a pmhx of chronic hypoxic respiratory failure (on 4L home O2) secondary to ( HFrEF 35-40%, GIII DD)  with pulmonary hypertension, HTN, HLD, DM2, active smoker , CVA with residual LE weakness (2014, wheelchair bound),  COPD , mood /depression,  presents of worsening dyspnea, orthopnea, weight gain concerning for acute hypoxic respiratory failure 2/2 to acute CHF exacerbation. CHF exacerbation possibly due to NSTEMI, pending cath. Patient is hemodynamically stable    Acute on chronic HFrEF  DM-2 / HTN / DL  Chronic hypoxic respiratory failure  Pulmonary HTN  Tobacco use  H/O CVA with residual LE weakness  COPD  Mood disorder / Depression  NSTEMI    -       Care D/W the HF specialist. Recommended to start her on Torsemide 10 mg po daily, Spironolactone 25 mg po daily and Dapagliflozin 10 mg po daily  Repeat ECHO showed EF is 15-20% as compared to the ECHO done in June, which had EF of 35-40%  CE trending down. D/W the cardiology. No further cardiac w/u or cath. Cont current medical management  Cont ASA, Plavix, Lipitor, Metoprolol   Check i's and o's and daily wt  Low salt diet and water restriction to 1.5 L/D  Daily BMP  Had cath done in June 2021 and RICARDO placed in mid RCA  GDMT. On Metoprolol and Entresto. Will start Spironolactone 25 mg po daily       Held Seroquel due to elevated QTc ~530   56 year old female with a pmhx of chronic hypoxic respiratory failure (on 4L home O2) secondary to ( HFrEF 35-40%, GIII DD)  with pulmonary hypertension, HTN, HLD, DM2, active smoker , CVA with residual LE weakness (2014, wheelchair bound),  COPD , mood /depression,  presents of worsening dyspnea, orthopnea, weight gain concerning for acute hypoxic respiratory failure 2/2 to acute CHF exacerbation. CHF exacerbation possibly due to NSTEMI, pending cath. Patient is hemodynamically stable    Acute on chronic HFrEF  DM-2 / HTN / DL  Chronic hypoxic respiratory failure  Pulmonary HTN  Tobacco use  H/O CVA with residual LE weakness  COPD  Mood disorder / Depression  NSTEMI    -       Care D/W the HF specialist. Recommended to start her on Torsemide 10 mg po daily, Spironolactone 25 mg po daily and Dapagliflozin 10 mg po daily  Repeat ECHO showed EF is 15-20% as compared to the ECHO done in June, which had EF of 35-40%  CE trending down. D/W the cardiology. No further cardiac w/u or cath. Cont current medical management  Cont ASA, Plavix, Lipitor, Metoprolol   Check i's and o's and daily wt  Low salt diet and water restriction to 1.5 L/D  Had cath done in June 2021 and RICARDO placed in mid RCA  GDMT. On Metoprolol and Entresto. Will start Spironolactone 25 mg po daily       Reduced Seroquel from 175 to 25 QHS due to elevated QTc ~530

## 2021-11-16 NOTE — PROGRESS NOTE ADULT - SUBJECTIVE AND OBJECTIVE BOX
WILLIAN MARY 56y Female  MRN#: [id number]   CODE STATUS:________    Hospital Day: 8d    Pt is currently admitted with the primary diagnosis of     SUBJECTIVE  Hospital Course  Patient is a 56y old Female who presents with a chief complaint of CHF (13 Nov 2021 14:23)  Currently admitted to medicine with the primary diagnosis of CHF exacerbation  This morning she is resting comfortably in bed and reports no new issues or overnight events. Complains of insomnia (off seroquel)  Patient reports improvement of her edema & shortness of breath    Overnight events     Subjective complaints     Present Today:   - Wilkerson:  No [  ], Yes [   ] : Indication:     - Type of IV Access:       .. CVC/Piccline:  No [  ], Yes [   ] : Indication:       .. Midline: No [  ], Yes [   ] : Indication:                                              OBJECTIVE  PAST MEDICAL & SURGICAL HISTORY  Hypertension    Hyperlipidemia    Anxiety and depression    COPD, severe    CHF (congestive heart failure)    Cerebrovascular accident (CVA)  Multiple    Type 2 diabetes mellitus    CKD (chronic kidney disease), stage II    No significant past surgical history                                                ALLERGIES:  No Known Allergies                           HOME MEDICATIONS  Home Medications:  Albuterol (Eqv-ProAir HFA) 90 mcg/inh inhalation aerosol: 2 puff(s) inhaled every 6 hours, As Needed (07 Jun 2021 11:22)  aspirin 81 mg oral tablet: 1 tab(s) orally once a day (07 Jun 2021 11:22)  fenofibrate 145 mg oral tablet: 1 tab(s) orally once a day (07 Jun 2021 11:22)  glipiZIDE 10 mg oral tablet: 1 tab(s) orally 2 times a day (07 Jun 2021 11:22)  Lovaza 1000 mg oral capsule: 2 cap(s) orally 2 times a day (08 Nov 2021 14:52)  magnesium oxide 400 mg oral tablet: 1 tab(s) orally 3 times a day (with meals) (16 Nov 2021 12:10)  metFORMIN 1000 mg oral tablet: 1 tab(s) orally 2 times a day Do not take until 6/23 (22 Jun 2021 12:58)  pantoprazole 40 mg oral delayed release tablet: 1 tab(s) orally once a day (before a meal) (16 Nov 2021 12:10)  Plavix 75 mg oral tablet: 1 tab(s) orally once a day (07 Jun 2021 11:22)  QUEtiapine 25 mg oral tablet: 1 tab(s) orally once a day (at bedtime) (16 Nov 2021 12:10)  Vitamin D2 1.25 mg (50,000 intl units) oral capsule: 1 cap(s) orally once a week (08 Nov 2021 14:52)                           MEDICATIONS:  STANDING MEDICATIONS  aspirin  chewable 81 milliGRAM(s) Oral daily  atorvastatin 80 milliGRAM(s) Oral at bedtime  budesonide 160 MICROgram(s)/formoterol 4.5 MICROgram(s) Inhaler 2 Puff(s) Inhalation two times a day  chlorhexidine 4% Liquid 1 Application(s) Topical <User Schedule>  clopidogrel Tablet 75 milliGRAM(s) Oral daily  dextrose 40% Gel 15 Gram(s) Oral once  dextrose 5%. 1000 milliLiter(s) IV Continuous <Continuous>  dextrose 5%. 1000 milliLiter(s) IV Continuous <Continuous>  dextrose 50% Injectable 12.5 Gram(s) IV Push once  dextrose 50% Injectable 25 Gram(s) IV Push once  dextrose 50% Injectable 25 Gram(s) IV Push once  enoxaparin Injectable 40 milliGRAM(s) SubCutaneous daily  fenofibrate Tablet 145 milliGRAM(s) Oral daily  glucagon  Injectable 1 milliGRAM(s) IntraMuscular once  insulin lispro (ADMELOG) corrective regimen sliding scale   SubCutaneous three times a day before meals  magnesium oxide 400 milliGRAM(s) Oral three times a day with meals  metoprolol succinate ER 50 milliGRAM(s) Oral daily  omega-3-Acid Ethyl Esters 2 Gram(s) Oral two times a day  pantoprazole    Tablet 40 milliGRAM(s) Oral before breakfast  potassium chloride   Powder 40 milliEquivalent(s) Oral every 4 hours  potassium chloride   Powder 40 milliEquivalent(s) Oral once  QUEtiapine 25 milliGRAM(s) Oral at bedtime  sacubitril 49 mG/valsartan 51 mG 1 Tablet(s) Oral two times a day  spironolactone 25 milliGRAM(s) Oral daily  torsemide 10 milliGRAM(s) Oral daily    PRN MEDICATIONS  ALBUTerol    90 MICROgram(s) HFA Inhaler 2 Puff(s) Inhalation every 6 hours PRN  melatonin 3 milliGRAM(s) Oral at bedtime PRN  torsemide 10 milliGRAM(s) Oral daily PRN                                            ------------------------------------------------------------  VITAL SIGNS: Last 24 Hours  T(C): 37.1 (16 Nov 2021 13:05), Max: 37.1 (16 Nov 2021 13:05)  T(F): 98.7 (16 Nov 2021 13:05), Max: 98.7 (16 Nov 2021 13:05)  HR: 64 (16 Nov 2021 13:05) (64 - 73)  BP: 159/76 (16 Nov 2021 13:05) (108/69 - 159/76)  BP(mean): --  RR: 18 (16 Nov 2021 13:05) (18 - 18)  SpO2: 100% (16 Nov 2021 04:07) (100% - 100%)      11-15-21 @ 07:01  -  11-16-21 @ 07:00  --------------------------------------------------------  IN: 1753 mL / OUT: 8800 mL / NET: -7047 mL    11-16-21 @ 07:01  -  11-16-21 @ 15:53  --------------------------------------------------------  IN: 680 mL / OUT: 1450 mL / NET: -770 mL                                               LABS:                        15.8   9.47  )-----------( 305      ( 16 Nov 2021 04:30 )             49.4     11-16    134<L>  |  82<L>  |  26<H>  ----------------------------<  167<H>  3.5   |  36<H>  |  1.0    Ca    9.6      16 Nov 2021 04:30  Mg     1.8     11-16    TPro  6.9  /  Alb  3.6  /  TBili  1.2  /  DBili  x   /  AST  18  /  ALT  10  /  AlkPhos  101  11-16                                                              RADIOLOGY:        PHYSICAL EXAM:  GENERAL: NAD, lying in bed comfortably  HEAD:  Atraumatic, Normocephalic  EYES: conjunctiva and sclera clear  ENT: Moist mucous membranes  NECK: No JVD  CHEST/LUNG: Clear to auscultation bilaterally; No rales, rhonchi, wheezing, or rubs. Unlabored respirations, on NC  HEART: Regular rate and rhythm; No murmurs, rubs, or gallops  ABDOMEN: BSx4; Soft, nontender, nondistended  EXTREMITIES:  2+ LE edema  NERVOUS SYSTEM:  A&Ox3

## 2021-11-16 NOTE — PROGRESS NOTE ADULT - ASSESSMENT
Assessment and Plan:   · Assessment	  ASSESSMENT & PLAN  56 year old female with a pmhx of chronic hypoxic respiratory failure (on 4L home O2) secondary to ( HFrEF 35-40%, GIII DD)  with pulmonary hypertension, HTN, HLD, DM2, active smoker , CVA with residual LE weakness (2014, wheelchair bound),  COPD , mood /depression,  presents of worsening dyspnea, orthopnea, weight gain concerning for acute hypoxic respiratory failure 2/2 to acute CHF exacerbation. CHF exacerbation likely due to NSTEMI, pending cath. Patient is hemodynamically stable    #Acute Hypoxic Respiratory Failure 2/2 acute on chronic HFmEF exacerbation  #Acute NSTEMI   #H/o CAD s/p PCI (6/2021)  - improved volume overloaded, hold bumex gtt + hypertonic saline, BMP + Mg bid (target K > 4.0 & Mg > 2.1)  GDMT (entresto, bb), added metolazone 11/13 per HF team, stopped 11/16/21  - start torsemide 10 mg QD + 10 mg QD PRN 2lb daily weight gain, farxiga 10 mg post discharge  - pt is negative 7L over past 24 hrs   - C/w metoprolol, Aspirin, Plavix, Entresto, increase b-blocker as tolerated  - repeat echo: EF is 15-20% (down from 35% on 6/2021), grade 2 diastolic dysfunction, severe pulm HTN, moderate MR, multiple wall motion abnormalities   - completed Heparin gtt (acs protocol)    - cardio following - no need for cath    #COPD on 4L home O2 / CORNELIUS on CPAP- currently saturating well on NC 4L baseline   # QTc prolongation -> Hold seroquel, do not give ambien  #H/o HLD- cont fenofibrate and lipitor   #DM type 2 (last A1c 06/2021 6.8)- monitor FS - add basal bolus insulin if needed  #H/o Mood Disorder- repeat EKG with , hold off Seroquel supplement with Mg, repeat blood work  #QTc Prolongation, daily ECG, Mg replacement, patient refusing IV Mg  #Morbid Obesity (BMI 45.8) - counselled about wt loss   #Active Smoker - counseled smoking cessation, diet and exercise  #Vitamin D Deficiency - c/w supplement   # Recurrent SVT -> needs ablation  # B/L UE Superficial vein thrombosis. warm compressors and extremity elevation                                                                              ----------------------------------------------------  # DVT prophylaxis lovenox    # GI prophylaxis protonix    # Diet consistent carb dash/tlc    # Activity Score (AM-PAC)    # Code status     # Disposition                                                                              --------------------------------------------------------    # Handoff   Patient has QTc prolongation, avoid high dose seroquel and ambien

## 2021-11-16 NOTE — DISCHARGE NOTE PROVIDER - DISCHARGE DIET
DASH Diet/Low Sodium Diet/Consistent Carbohydrate Diabetic Diets DASH Diet/Low Sodium Diet/Consistent Carbohydrate Diabetic Diets/Other Diet Instructions

## 2021-11-17 ENCOUNTER — TRANSCRIPTION ENCOUNTER (OUTPATIENT)
Age: 56
End: 2021-11-17

## 2021-11-17 VITALS
RESPIRATION RATE: 18 BRPM | DIASTOLIC BLOOD PRESSURE: 76 MMHG | SYSTOLIC BLOOD PRESSURE: 117 MMHG | TEMPERATURE: 96 F | HEART RATE: 95 BPM

## 2021-11-17 LAB
ALBUMIN SERPL ELPH-MCNC: 3.4 G/DL — LOW (ref 3.5–5.2)
ALP SERPL-CCNC: 96 U/L — SIGNIFICANT CHANGE UP (ref 30–115)
ALT FLD-CCNC: 11 U/L — SIGNIFICANT CHANGE UP (ref 0–41)
ANION GAP SERPL CALC-SCNC: 16 MMOL/L — HIGH (ref 7–14)
ANION GAP SERPL CALC-SCNC: 17 MMOL/L — HIGH (ref 7–14)
AST SERPL-CCNC: 17 U/L — SIGNIFICANT CHANGE UP (ref 0–41)
BASOPHILS # BLD AUTO: 0.1 K/UL — SIGNIFICANT CHANGE UP (ref 0–0.2)
BASOPHILS NFR BLD AUTO: 1.7 % — HIGH (ref 0–1)
BILIRUB SERPL-MCNC: 0.9 MG/DL — SIGNIFICANT CHANGE UP (ref 0.2–1.2)
BUN SERPL-MCNC: 29 MG/DL — HIGH (ref 10–20)
BUN SERPL-MCNC: 30 MG/DL — HIGH (ref 10–20)
CALCIUM SERPL-MCNC: 9.4 MG/DL — SIGNIFICANT CHANGE UP (ref 8.5–10.1)
CALCIUM SERPL-MCNC: 9.7 MG/DL — SIGNIFICANT CHANGE UP (ref 8.5–10.1)
CHLORIDE SERPL-SCNC: 88 MMOL/L — LOW (ref 98–110)
CHLORIDE SERPL-SCNC: 89 MMOL/L — LOW (ref 98–110)
CO2 SERPL-SCNC: 30 MMOL/L — SIGNIFICANT CHANGE UP (ref 17–32)
CO2 SERPL-SCNC: 32 MMOL/L — SIGNIFICANT CHANGE UP (ref 17–32)
CREAT SERPL-MCNC: 1.1 MG/DL — SIGNIFICANT CHANGE UP (ref 0.7–1.5)
CREAT SERPL-MCNC: 1.2 MG/DL — SIGNIFICANT CHANGE UP (ref 0.7–1.5)
EOSINOPHIL # BLD AUTO: 0.07 K/UL — SIGNIFICANT CHANGE UP (ref 0–0.7)
EOSINOPHIL NFR BLD AUTO: 1.2 % — SIGNIFICANT CHANGE UP (ref 0–8)
GLUCOSE BLDC GLUCOMTR-MCNC: 164 MG/DL — HIGH (ref 70–99)
GLUCOSE BLDC GLUCOMTR-MCNC: 172 MG/DL — HIGH (ref 70–99)
GLUCOSE BLDC GLUCOMTR-MCNC: 191 MG/DL — HIGH (ref 70–99)
GLUCOSE BLDC GLUCOMTR-MCNC: 207 MG/DL — HIGH (ref 70–99)
GLUCOSE SERPL-MCNC: 177 MG/DL — HIGH (ref 70–99)
GLUCOSE SERPL-MCNC: 191 MG/DL — HIGH (ref 70–99)
HCT VFR BLD CALC: 44.1 % — SIGNIFICANT CHANGE UP (ref 37–47)
HGB BLD-MCNC: 13.9 G/DL — SIGNIFICANT CHANGE UP (ref 12–16)
IMM GRANULOCYTES NFR BLD AUTO: 0.3 % — SIGNIFICANT CHANGE UP (ref 0.1–0.3)
LYMPHOCYTES # BLD AUTO: 1.43 K/UL — SIGNIFICANT CHANGE UP (ref 1.2–3.4)
LYMPHOCYTES # BLD AUTO: 23.8 % — SIGNIFICANT CHANGE UP (ref 20.5–51.1)
MAGNESIUM SERPL-MCNC: 1.8 MG/DL — SIGNIFICANT CHANGE UP (ref 1.8–2.4)
MAGNESIUM SERPL-MCNC: 1.9 MG/DL — SIGNIFICANT CHANGE UP (ref 1.8–2.4)
MCHC RBC-ENTMCNC: 28.5 PG — SIGNIFICANT CHANGE UP (ref 27–31)
MCHC RBC-ENTMCNC: 31.5 G/DL — LOW (ref 32–37)
MCV RBC AUTO: 90.4 FL — SIGNIFICANT CHANGE UP (ref 81–99)
MONOCYTES # BLD AUTO: 0.52 K/UL — SIGNIFICANT CHANGE UP (ref 0.1–0.6)
MONOCYTES NFR BLD AUTO: 8.7 % — SIGNIFICANT CHANGE UP (ref 1.7–9.3)
NEUTROPHILS # BLD AUTO: 3.87 K/UL — SIGNIFICANT CHANGE UP (ref 1.4–6.5)
NEUTROPHILS NFR BLD AUTO: 64.3 % — SIGNIFICANT CHANGE UP (ref 42.2–75.2)
NRBC # BLD: 0 /100 WBCS — SIGNIFICANT CHANGE UP (ref 0–0)
PLATELET # BLD AUTO: 277 K/UL — SIGNIFICANT CHANGE UP (ref 130–400)
POTASSIUM SERPL-MCNC: 4 MMOL/L — SIGNIFICANT CHANGE UP (ref 3.5–5)
POTASSIUM SERPL-MCNC: 4.1 MMOL/L — SIGNIFICANT CHANGE UP (ref 3.5–5)
POTASSIUM SERPL-SCNC: 4 MMOL/L — SIGNIFICANT CHANGE UP (ref 3.5–5)
POTASSIUM SERPL-SCNC: 4.1 MMOL/L — SIGNIFICANT CHANGE UP (ref 3.5–5)
PROT SERPL-MCNC: 6.5 G/DL — SIGNIFICANT CHANGE UP (ref 6–8)
RBC # BLD: 4.88 M/UL — SIGNIFICANT CHANGE UP (ref 4.2–5.4)
RBC # FLD: 15.6 % — HIGH (ref 11.5–14.5)
SARS-COV-2 RNA SPEC QL NAA+PROBE: SIGNIFICANT CHANGE UP
SODIUM SERPL-SCNC: 136 MMOL/L — SIGNIFICANT CHANGE UP (ref 135–146)
SODIUM SERPL-SCNC: 136 MMOL/L — SIGNIFICANT CHANGE UP (ref 135–146)
WBC # BLD: 6.01 K/UL — SIGNIFICANT CHANGE UP (ref 4.8–10.8)
WBC # FLD AUTO: 6.01 K/UL — SIGNIFICANT CHANGE UP (ref 4.8–10.8)

## 2021-11-17 PROCEDURE — 71045 X-RAY EXAM CHEST 1 VIEW: CPT | Mod: 26

## 2021-11-17 PROCEDURE — 99239 HOSP IP/OBS DSCHRG MGMT >30: CPT

## 2021-11-17 PROCEDURE — 93010 ELECTROCARDIOGRAM REPORT: CPT

## 2021-11-17 RX ADMIN — PANTOPRAZOLE SODIUM 40 MILLIGRAM(S): 20 TABLET, DELAYED RELEASE ORAL at 06:04

## 2021-11-17 RX ADMIN — CHLORHEXIDINE GLUCONATE 1 APPLICATION(S): 213 SOLUTION TOPICAL at 06:03

## 2021-11-17 RX ADMIN — Medication 50 MILLIGRAM(S): at 05:30

## 2021-11-17 RX ADMIN — MAGNESIUM OXIDE 400 MG ORAL TABLET 400 MILLIGRAM(S): 241.3 TABLET ORAL at 17:23

## 2021-11-17 RX ADMIN — SPIRONOLACTONE 25 MILLIGRAM(S): 25 TABLET, FILM COATED ORAL at 05:29

## 2021-11-17 RX ADMIN — MAGNESIUM OXIDE 400 MG ORAL TABLET 400 MILLIGRAM(S): 241.3 TABLET ORAL at 12:40

## 2021-11-17 RX ADMIN — Medication 1: at 08:41

## 2021-11-17 RX ADMIN — ATORVASTATIN CALCIUM 80 MILLIGRAM(S): 80 TABLET, FILM COATED ORAL at 21:37

## 2021-11-17 RX ADMIN — MAGNESIUM OXIDE 400 MG ORAL TABLET 400 MILLIGRAM(S): 241.3 TABLET ORAL at 08:41

## 2021-11-17 RX ADMIN — Medication 81 MILLIGRAM(S): at 11:32

## 2021-11-17 RX ADMIN — ENOXAPARIN SODIUM 40 MILLIGRAM(S): 100 INJECTION SUBCUTANEOUS at 11:33

## 2021-11-17 RX ADMIN — CLOPIDOGREL BISULFATE 75 MILLIGRAM(S): 75 TABLET, FILM COATED ORAL at 11:32

## 2021-11-17 RX ADMIN — Medication 2 GRAM(S): at 21:37

## 2021-11-17 RX ADMIN — Medication 2: at 17:22

## 2021-11-17 RX ADMIN — SACUBITRIL AND VALSARTAN 1 TABLET(S): 24; 26 TABLET, FILM COATED ORAL at 17:23

## 2021-11-17 RX ADMIN — Medication 2 GRAM(S): at 05:57

## 2021-11-17 RX ADMIN — Medication 145 MILLIGRAM(S): at 11:32

## 2021-11-17 RX ADMIN — BUDESONIDE AND FORMOTEROL FUMARATE DIHYDRATE 2 PUFF(S): 160; 4.5 AEROSOL RESPIRATORY (INHALATION) at 08:42

## 2021-11-17 RX ADMIN — QUETIAPINE FUMARATE 25 MILLIGRAM(S): 200 TABLET, FILM COATED ORAL at 21:37

## 2021-11-17 RX ADMIN — SACUBITRIL AND VALSARTAN 1 TABLET(S): 24; 26 TABLET, FILM COATED ORAL at 05:29

## 2021-11-17 RX ADMIN — Medication 1: at 12:40

## 2021-11-17 RX ADMIN — Medication 10 MILLIGRAM(S): at 05:30

## 2021-11-17 NOTE — PROGRESS NOTE ADULT - PROVIDER SPECIALTY LIST ADULT
Hospitalist
Hospitalist
Internal Medicine
Cardiology
Heart Failure
Hospitalist
Internal Medicine
Cardiology
Cardiology
Heart Failure
Heart Failure
Hospitalist
Hospitalist
Internal Medicine

## 2021-11-17 NOTE — DISCHARGE NOTE NURSING/CASE MANAGEMENT/SOCIAL WORK - PATIENT PORTAL LINK FT
You can access the FollowMyHealth Patient Portal offered by [facility] by registering at the following website: [email or web address]. By joining [facility]’s FollowMyHealth portal, you will also be able to view your health information using other applications (apps) compatible with our system.

## 2021-11-17 NOTE — PROGRESS NOTE ADULT - ASSESSMENT
A 56 year old female with a PMH of chronic hypoxic respiratory failure (on 4L home O2) secondary to ( HFrEF 35-40%, GIII DD)  with pulmonary hypertension, HTN, HLD, DM2, active smoker , CVA with residual LE weakness (2014, wheelchair bound),  COPD , mood /depression,  presents of worsening shortness of breath and increase weight with  B/L LE swelling over past week.    Acute on chronic HFrEF  DM-2 / HTN / DL  Chronic hypoxic respiratory failure  Pulmonary HTN  Tobacco use  H/O CVA with residual LE weakness  COPD  Mood disorder / Depression  NSTEMI                PLAN:    ·	In negative balance of 2280 over the last 24 hrs.   ·	Care D/W the HF specialist. Recommended to start her on Torsemide 10 mg po daily, Spironolactone 25 mg po daily and Dapagliflozin 10 mg po daily  ·	Repeat ECHO showed EF is 15-20% as compared to the ECHO done in June, which had EF of 35-40%  ·	CE trending down now. D/W the cardiology. No further cardiac w/u or cath. Cont current medical management  ·	Cont ASA, Plavix, Lipitor, Metoprolol   ·	Low salt diet and water restriction to 1.5 L/D  ·	Had cath done in June 2021 and RICARDO placed in mid RCA  ·	GDMT. On Metoprolol and Entresto. Started Spironolactone 25 mg po daily   ·	Cont her other meds  ·	D/C home today    * Med rec reviewed. Plan of care D/W the pt. Time spent 40 minutes.     Progress Note Handoff    Pending (specify):  Consults________, Tests________, Test Results_______, Other___Social services for D/C planning____  Family discussion:  Disposition: Home___/SNF___/Other________/Unknown at this time________    Stanley Domingo MD  Spectra: [phone]

## 2021-11-17 NOTE — PROGRESS NOTE ADULT - SUBJECTIVE AND OBJECTIVE BOX
WILLIAN MARY  56y Female    CHIEF COMPLAINT:    Patient is a 56y old  Female who presents with a chief complaint of CHF (15 Nov 2021 19:05)      INTERVAL HPI/OVERNIGHT EVENTS:    Patient seen and examined. No sob. Euvolemic. Feels better.     ROS: All other systems are negative.    Vital Signs:    T(F): 96.5 (21 @ 05:00), Max: 98.7 (21 @ 13:05)  HR: 69 (21 @ 05:00) (64 - 71)  BP: 112/65 (21 @ 05:00) (109/66 - 159/76)  RR: 18 (21 @ 07:59) (18 - 18)  SpO2: 96% (21 @ 07:59) (96% - 98%)  I&O's Summary    2021 07:01  -  2021 07:00  --------------------------------------------------------  IN: 920 mL / OUT: 3200 mL / NET: -2280 mL    2021 07:01  -  2021 11:46  --------------------------------------------------------  IN: 2240 mL / OUT: 0 mL / NET: 2240 mL      Daily     Daily Weight in k.9 (2021 05:00)  CAPILLARY BLOOD GLUCOSE      POCT Blood Glucose.: 191 mg/dL (2021 08:07)  POCT Blood Glucose.: 159 mg/dL (2021 21:45)  POCT Blood Glucose.: 149 mg/dL (2021 17:04)      PHYSICAL EXAM:    GENERAL:  NAD  SKIN: No rashes or lesions  HENT: Atraumatic. Normocephalic. PERRL. Moist membranes.  NECK: Supple, No JVD. No lymphadenopathy.  PULMONARY: CTA B/L. No wheezing. No rales  CVS: Normal S1, S2. Rate and Rhythm are regular. No murmurs.  ABDOMEN/GI: Soft, Nontender, Nondistended; BS present  EXTREMITIES: Peripheral pulses intact. No edema B/L LE.  NEUROLOGIC:  No motor or sensory deficit.  PSYCH: Alert & oriented x 3    Consultant(s) Notes Reviewed:  [x ] YES  [ ] NO  Care Discussed with Consultants/Other Providers [ x] YES  [ ] NO    EKG reviewed  Telemetry reviewed    LABS:                        13.9   6.01  )-----------( 277      ( 2021 07:53 )             44.1         136  |  88<L>  |  30<H>  ----------------------------<  177<H>  4.1   |  32  |  1.2    Ca    9.7      2021 07:53  Mg     1.9         TPro  6.5  /  Alb  3.4<L>  /  TBili  0.9  /  DBili  x   /  AST  17  /  ALT  11  /  AlkPhos  96                RADIOLOGY & ADDITIONAL TESTS:      Imaging or report Personally Reviewed:  [ ] YES  [ ] NO    Medications:  Standing  aspirin  chewable 81 milliGRAM(s) Oral daily  atorvastatin 80 milliGRAM(s) Oral at bedtime  budesonide 160 MICROgram(s)/formoterol 4.5 MICROgram(s) Inhaler 2 Puff(s) Inhalation two times a day  chlorhexidine 4% Liquid 1 Application(s) Topical <User Schedule>  clopidogrel Tablet 75 milliGRAM(s) Oral daily  dextrose 40% Gel 15 Gram(s) Oral once  dextrose 5%. 1000 milliLiter(s) IV Continuous <Continuous>  dextrose 5%. 1000 milliLiter(s) IV Continuous <Continuous>  dextrose 50% Injectable 25 Gram(s) IV Push once  dextrose 50% Injectable 12.5 Gram(s) IV Push once  dextrose 50% Injectable 25 Gram(s) IV Push once  enoxaparin Injectable 40 milliGRAM(s) SubCutaneous daily  fenofibrate Tablet 145 milliGRAM(s) Oral daily  glucagon  Injectable 1 milliGRAM(s) IntraMuscular once  insulin lispro (ADMELOG) corrective regimen sliding scale   SubCutaneous three times a day before meals  magnesium oxide 400 milliGRAM(s) Oral three times a day with meals  metoprolol succinate ER 50 milliGRAM(s) Oral daily  omega-3-Acid Ethyl Esters 2 Gram(s) Oral two times a day  pantoprazole    Tablet 40 milliGRAM(s) Oral before breakfast  QUEtiapine 25 milliGRAM(s) Oral at bedtime  sacubitril 49 mG/valsartan 51 mG 1 Tablet(s) Oral two times a day  spironolactone 25 milliGRAM(s) Oral daily  torsemide 10 milliGRAM(s) Oral daily    PRN Meds  ALBUTerol    90 MICROgram(s) HFA Inhaler 2 Puff(s) Inhalation every 6 hours PRN  melatonin 3 milliGRAM(s) Oral at bedtime PRN  torsemide 10 milliGRAM(s) Oral daily PRN      Case discussed with resident    Care discussed with pt/family

## 2021-11-23 DIAGNOSIS — R94.31 ABNORMAL ELECTROCARDIOGRAM [ECG] [EKG]: ICD-10-CM

## 2021-11-23 DIAGNOSIS — E66.01 MORBID (SEVERE) OBESITY DUE TO EXCESS CALORIES: ICD-10-CM

## 2021-11-23 DIAGNOSIS — Z20.822 CONTACT WITH AND (SUSPECTED) EXPOSURE TO COVID-19: ICD-10-CM

## 2021-11-23 DIAGNOSIS — Z79.02 LONG TERM (CURRENT) USE OF ANTITHROMBOTICS/ANTIPLATELETS: ICD-10-CM

## 2021-11-23 DIAGNOSIS — Z79.899 OTHER LONG TERM (CURRENT) DRUG THERAPY: ICD-10-CM

## 2021-11-23 DIAGNOSIS — E11.22 TYPE 2 DIABETES MELLITUS WITH DIABETIC CHRONIC KIDNEY DISEASE: ICD-10-CM

## 2021-11-23 DIAGNOSIS — I50.23 ACUTE ON CHRONIC SYSTOLIC (CONGESTIVE) HEART FAILURE: ICD-10-CM

## 2021-11-23 DIAGNOSIS — Z79.82 LONG TERM (CURRENT) USE OF ASPIRIN: ICD-10-CM

## 2021-11-23 DIAGNOSIS — I13.0 HYPERTENSIVE HEART AND CHRONIC KIDNEY DISEASE WITH HEART FAILURE AND STAGE 1 THROUGH STAGE 4 CHRONIC KIDNEY DISEASE, OR UNSPECIFIED CHRONIC KIDNEY DISEASE: ICD-10-CM

## 2021-11-23 DIAGNOSIS — I21.4 NON-ST ELEVATION (NSTEMI) MYOCARDIAL INFARCTION: ICD-10-CM

## 2021-11-23 DIAGNOSIS — F32.A DEPRESSION, UNSPECIFIED: ICD-10-CM

## 2021-11-23 DIAGNOSIS — E11.65 TYPE 2 DIABETES MELLITUS WITH HYPERGLYCEMIA: ICD-10-CM

## 2021-11-23 DIAGNOSIS — N18.2 CHRONIC KIDNEY DISEASE, STAGE 2 (MILD): ICD-10-CM

## 2021-11-23 DIAGNOSIS — Z79.84 LONG TERM (CURRENT) USE OF ORAL HYPOGLYCEMIC DRUGS: ICD-10-CM

## 2021-11-23 DIAGNOSIS — F17.210 NICOTINE DEPENDENCE, CIGARETTES, UNCOMPLICATED: ICD-10-CM

## 2021-11-23 DIAGNOSIS — G47.33 OBSTRUCTIVE SLEEP APNEA (ADULT) (PEDIATRIC): ICD-10-CM

## 2021-11-23 DIAGNOSIS — Z99.81 DEPENDENCE ON SUPPLEMENTAL OXYGEN: ICD-10-CM

## 2021-11-23 DIAGNOSIS — E55.9 VITAMIN D DEFICIENCY, UNSPECIFIED: ICD-10-CM

## 2021-11-23 DIAGNOSIS — F41.9 ANXIETY DISORDER, UNSPECIFIED: ICD-10-CM

## 2021-11-23 DIAGNOSIS — I69.344 MONOPLEGIA OF LOWER LIMB FOLLOWING CEREBRAL INFARCTION AFFECTING LEFT NON-DOMINANT SIDE: ICD-10-CM

## 2021-11-23 DIAGNOSIS — I25.10 ATHEROSCLEROTIC HEART DISEASE OF NATIVE CORONARY ARTERY WITHOUT ANGINA PECTORIS: ICD-10-CM

## 2021-11-23 DIAGNOSIS — I47.1 SUPRAVENTRICULAR TACHYCARDIA: ICD-10-CM

## 2021-11-23 DIAGNOSIS — Z99.3 DEPENDENCE ON WHEELCHAIR: ICD-10-CM

## 2021-11-23 DIAGNOSIS — J44.9 CHRONIC OBSTRUCTIVE PULMONARY DISEASE, UNSPECIFIED: ICD-10-CM

## 2021-11-23 DIAGNOSIS — I27.20 PULMONARY HYPERTENSION, UNSPECIFIED: ICD-10-CM

## 2021-11-23 DIAGNOSIS — E78.5 HYPERLIPIDEMIA, UNSPECIFIED: ICD-10-CM

## 2021-11-23 DIAGNOSIS — J96.21 ACUTE AND CHRONIC RESPIRATORY FAILURE WITH HYPOXIA: ICD-10-CM

## 2021-11-29 ENCOUNTER — INPATIENT (INPATIENT)
Facility: HOSPITAL | Age: 56
LOS: 7 days | Discharge: HOME | End: 2021-12-07
Attending: HOSPITALIST | Admitting: HOSPITALIST
Payer: MEDICARE

## 2021-11-29 VITALS
RESPIRATION RATE: 22 BRPM | TEMPERATURE: 97 F | WEIGHT: 184.97 LBS | HEART RATE: 87 BPM | DIASTOLIC BLOOD PRESSURE: 99 MMHG | SYSTOLIC BLOOD PRESSURE: 163 MMHG | HEIGHT: 59 IN | OXYGEN SATURATION: 99 %

## 2021-11-29 LAB
ALBUMIN SERPL ELPH-MCNC: 3.8 G/DL — SIGNIFICANT CHANGE UP (ref 3.5–5.2)
ALP SERPL-CCNC: 103 U/L — SIGNIFICANT CHANGE UP (ref 30–115)
ALT FLD-CCNC: 18 U/L — SIGNIFICANT CHANGE UP (ref 0–41)
ANION GAP SERPL CALC-SCNC: 19 MMOL/L — HIGH (ref 7–14)
AST SERPL-CCNC: 28 U/L — SIGNIFICANT CHANGE UP (ref 0–41)
BASE EXCESS BLDV CALC-SCNC: 0.6 MMOL/L — SIGNIFICANT CHANGE UP (ref -2–3)
BASOPHILS # BLD AUTO: 0.09 K/UL — SIGNIFICANT CHANGE UP (ref 0–0.2)
BASOPHILS NFR BLD AUTO: 1.5 % — HIGH (ref 0–1)
BILIRUB SERPL-MCNC: 2.1 MG/DL — HIGH (ref 0.2–1.2)
BUN SERPL-MCNC: 18 MG/DL — SIGNIFICANT CHANGE UP (ref 10–20)
CA-I SERPL-SCNC: 1.17 MMOL/L — SIGNIFICANT CHANGE UP (ref 1.15–1.33)
CALCIUM SERPL-MCNC: 9.2 MG/DL — SIGNIFICANT CHANGE UP (ref 8.5–10.1)
CHLORIDE SERPL-SCNC: 100 MMOL/L — SIGNIFICANT CHANGE UP (ref 98–110)
CO2 SERPL-SCNC: 19 MMOL/L — SIGNIFICANT CHANGE UP (ref 17–32)
CREAT SERPL-MCNC: 0.8 MG/DL — SIGNIFICANT CHANGE UP (ref 0.7–1.5)
EOSINOPHIL # BLD AUTO: 0.06 K/UL — SIGNIFICANT CHANGE UP (ref 0–0.7)
EOSINOPHIL NFR BLD AUTO: 1 % — SIGNIFICANT CHANGE UP (ref 0–8)
GAS PNL BLDV: 131 MMOL/L — LOW (ref 136–145)
GAS PNL BLDV: SIGNIFICANT CHANGE UP
GLUCOSE BLDC GLUCOMTR-MCNC: 131 MG/DL — HIGH (ref 70–99)
GLUCOSE SERPL-MCNC: 110 MG/DL — HIGH (ref 70–99)
HCG SERPL QL: NEGATIVE — SIGNIFICANT CHANGE UP
HCO3 BLDV-SCNC: 27 MMOL/L — SIGNIFICANT CHANGE UP (ref 22–29)
HCT VFR BLD CALC: 40.9 % — SIGNIFICANT CHANGE UP (ref 37–47)
HCT VFR BLDA CALC: 39 % — SIGNIFICANT CHANGE UP (ref 34.5–46.5)
HGB BLD CALC-MCNC: 13 G/DL — SIGNIFICANT CHANGE UP (ref 11.7–16.1)
HGB BLD-MCNC: 13.1 G/DL — SIGNIFICANT CHANGE UP (ref 12–16)
IMM GRANULOCYTES NFR BLD AUTO: 0.5 % — HIGH (ref 0.1–0.3)
LACTATE BLDV-MCNC: 1.6 MMOL/L — SIGNIFICANT CHANGE UP (ref 0.5–2)
LACTATE SERPL-SCNC: 1.5 MMOL/L — SIGNIFICANT CHANGE UP (ref 0.7–2)
LYMPHOCYTES # BLD AUTO: 1.14 K/UL — LOW (ref 1.2–3.4)
LYMPHOCYTES # BLD AUTO: 19.3 % — LOW (ref 20.5–51.1)
MCHC RBC-ENTMCNC: 28.1 PG — SIGNIFICANT CHANGE UP (ref 27–31)
MCHC RBC-ENTMCNC: 32 G/DL — SIGNIFICANT CHANGE UP (ref 32–37)
MCV RBC AUTO: 87.8 FL — SIGNIFICANT CHANGE UP (ref 81–99)
MONOCYTES # BLD AUTO: 0.28 K/UL — SIGNIFICANT CHANGE UP (ref 0.1–0.6)
MONOCYTES NFR BLD AUTO: 4.7 % — SIGNIFICANT CHANGE UP (ref 1.7–9.3)
NEUTROPHILS # BLD AUTO: 4.3 K/UL — SIGNIFICANT CHANGE UP (ref 1.4–6.5)
NEUTROPHILS NFR BLD AUTO: 73 % — SIGNIFICANT CHANGE UP (ref 42.2–75.2)
NRBC # BLD: 0 /100 WBCS — SIGNIFICANT CHANGE UP (ref 0–0)
NT-PROBNP SERPL-SCNC: 5736 PG/ML — HIGH (ref 0–300)
PCO2 BLDV: 47 MMHG — HIGH (ref 39–42)
PH BLDV: 7.36 — SIGNIFICANT CHANGE UP (ref 7.32–7.43)
PLATELET # BLD AUTO: 286 K/UL — SIGNIFICANT CHANGE UP (ref 130–400)
PO2 BLDV: 38 MMHG — SIGNIFICANT CHANGE UP
POTASSIUM BLDV-SCNC: 4 MMOL/L — SIGNIFICANT CHANGE UP (ref 3.5–5.1)
POTASSIUM SERPL-MCNC: 4.4 MMOL/L — SIGNIFICANT CHANGE UP (ref 3.5–5)
POTASSIUM SERPL-SCNC: 4.4 MMOL/L — SIGNIFICANT CHANGE UP (ref 3.5–5)
PROT SERPL-MCNC: 7.2 G/DL — SIGNIFICANT CHANGE UP (ref 6–8)
RAPID RVP RESULT: SIGNIFICANT CHANGE UP
RBC # BLD: 4.66 M/UL — SIGNIFICANT CHANGE UP (ref 4.2–5.4)
RBC # FLD: 16.9 % — HIGH (ref 11.5–14.5)
SAO2 % BLDV: 60.4 % — SIGNIFICANT CHANGE UP
SARS-COV-2 RNA SPEC QL NAA+PROBE: SIGNIFICANT CHANGE UP
SODIUM SERPL-SCNC: 138 MMOL/L — SIGNIFICANT CHANGE UP (ref 135–146)
TROPONIN T SERPL-MCNC: 0.05 NG/ML — CRITICAL HIGH
WBC # BLD: 5.9 K/UL — SIGNIFICANT CHANGE UP (ref 4.8–10.8)
WBC # FLD AUTO: 5.9 K/UL — SIGNIFICANT CHANGE UP (ref 4.8–10.8)

## 2021-11-29 PROCEDURE — 99223 1ST HOSP IP/OBS HIGH 75: CPT

## 2021-11-29 PROCEDURE — 71045 X-RAY EXAM CHEST 1 VIEW: CPT | Mod: 26

## 2021-11-29 PROCEDURE — 99284 EMERGENCY DEPT VISIT MOD MDM: CPT

## 2021-11-29 PROCEDURE — 93010 ELECTROCARDIOGRAM REPORT: CPT

## 2021-11-29 RX ORDER — CHLORHEXIDINE GLUCONATE 213 G/1000ML
1 SOLUTION TOPICAL
Refills: 0 | Status: DISCONTINUED | OUTPATIENT
Start: 2021-11-29 | End: 2021-12-07

## 2021-11-29 RX ORDER — FENOFIBRATE,MICRONIZED 130 MG
145 CAPSULE ORAL DAILY
Refills: 0 | Status: DISCONTINUED | OUTPATIENT
Start: 2021-11-29 | End: 2021-12-07

## 2021-11-29 RX ORDER — DEXTROSE 50 % IN WATER 50 %
25 SYRINGE (ML) INTRAVENOUS ONCE
Refills: 0 | Status: DISCONTINUED | OUTPATIENT
Start: 2021-11-29 | End: 2021-12-07

## 2021-11-29 RX ORDER — ATORVASTATIN CALCIUM 80 MG/1
80 TABLET, FILM COATED ORAL AT BEDTIME
Refills: 0 | Status: DISCONTINUED | OUTPATIENT
Start: 2021-11-29 | End: 2021-12-07

## 2021-11-29 RX ORDER — ENOXAPARIN SODIUM 100 MG/ML
40 INJECTION SUBCUTANEOUS DAILY
Refills: 0 | Status: DISCONTINUED | OUTPATIENT
Start: 2021-11-29 | End: 2021-12-07

## 2021-11-29 RX ORDER — SODIUM CHLORIDE 9 MG/ML
1000 INJECTION, SOLUTION INTRAVENOUS
Refills: 0 | Status: DISCONTINUED | OUTPATIENT
Start: 2021-11-29 | End: 2021-12-07

## 2021-11-29 RX ORDER — ASPIRIN/CALCIUM CARB/MAGNESIUM 324 MG
81 TABLET ORAL DAILY
Refills: 0 | Status: DISCONTINUED | OUTPATIENT
Start: 2021-11-29 | End: 2021-12-07

## 2021-11-29 RX ORDER — CLOPIDOGREL BISULFATE 75 MG/1
75 TABLET, FILM COATED ORAL DAILY
Refills: 0 | Status: DISCONTINUED | OUTPATIENT
Start: 2021-11-29 | End: 2021-12-07

## 2021-11-29 RX ORDER — BUDESONIDE AND FORMOTEROL FUMARATE DIHYDRATE 160; 4.5 UG/1; UG/1
2 AEROSOL RESPIRATORY (INHALATION)
Refills: 0 | Status: DISCONTINUED | OUTPATIENT
Start: 2021-11-29 | End: 2021-12-07

## 2021-11-29 RX ORDER — SACUBITRIL AND VALSARTAN 24; 26 MG/1; MG/1
1 TABLET, FILM COATED ORAL
Refills: 0 | Status: DISCONTINUED | OUTPATIENT
Start: 2021-11-29 | End: 2021-12-07

## 2021-11-29 RX ORDER — FUROSEMIDE 40 MG
40 TABLET ORAL ONCE
Refills: 0 | Status: DISCONTINUED | OUTPATIENT
Start: 2021-11-29 | End: 2021-11-30

## 2021-11-29 RX ORDER — INSULIN LISPRO 100/ML
VIAL (ML) SUBCUTANEOUS
Refills: 0 | Status: DISCONTINUED | OUTPATIENT
Start: 2021-11-29 | End: 2021-12-07

## 2021-11-29 RX ORDER — DEXTROSE 50 % IN WATER 50 %
12.5 SYRINGE (ML) INTRAVENOUS ONCE
Refills: 0 | Status: DISCONTINUED | OUTPATIENT
Start: 2021-11-29 | End: 2021-12-07

## 2021-11-29 RX ORDER — ALBUTEROL 90 UG/1
2 AEROSOL, METERED ORAL EVERY 6 HOURS
Refills: 0 | Status: DISCONTINUED | OUTPATIENT
Start: 2021-11-29 | End: 2021-12-07

## 2021-11-29 RX ORDER — QUETIAPINE FUMARATE 200 MG/1
25 TABLET, FILM COATED ORAL AT BEDTIME
Refills: 0 | Status: DISCONTINUED | OUTPATIENT
Start: 2021-11-29 | End: 2021-12-07

## 2021-11-29 RX ORDER — GLUCAGON INJECTION, SOLUTION 0.5 MG/.1ML
1 INJECTION, SOLUTION SUBCUTANEOUS ONCE
Refills: 0 | Status: DISCONTINUED | OUTPATIENT
Start: 2021-11-29 | End: 2021-12-07

## 2021-11-29 RX ORDER — PANTOPRAZOLE SODIUM 20 MG/1
40 TABLET, DELAYED RELEASE ORAL
Refills: 0 | Status: DISCONTINUED | OUTPATIENT
Start: 2021-11-29 | End: 2021-12-07

## 2021-11-29 RX ORDER — FUROSEMIDE 40 MG
40 TABLET ORAL EVERY 12 HOURS
Refills: 0 | Status: DISCONTINUED | OUTPATIENT
Start: 2021-11-29 | End: 2021-11-30

## 2021-11-29 RX ORDER — DEXTROSE 50 % IN WATER 50 %
15 SYRINGE (ML) INTRAVENOUS ONCE
Refills: 0 | Status: DISCONTINUED | OUTPATIENT
Start: 2021-11-29 | End: 2021-12-07

## 2021-11-29 RX ADMIN — ATORVASTATIN CALCIUM 80 MILLIGRAM(S): 80 TABLET, FILM COATED ORAL at 21:49

## 2021-11-29 RX ADMIN — QUETIAPINE FUMARATE 25 MILLIGRAM(S): 200 TABLET, FILM COATED ORAL at 21:50

## 2021-11-29 NOTE — ED PROVIDER NOTE - OBJECTIVE STATEMENT
56 year old female with Hx of Chronic hypoxic respiratory failure (on 4L home O2), CHF, HTN, High Cholesterol , CVA with residual LE weakness (2014, wheelchair bound),  COPD , Depression,  presents to the ED BIBA c/o "I was at my PMD's office for check-up. My oxygen ran out and my pulse ox was low. I have some lower extremity swelling. " no cp/ cough/ fever/ chills/ abd pain

## 2021-11-29 NOTE — H&P ADULT - NSHPPHYSICALEXAM_GEN_ALL_CORE
- Physical Exam in ED  * General Appearance: Alert, cooperative, interactive, oriented to time, place, and person, in no acute distress  * Nose: Nares normal, septum midline, mucosa normal, no drainage or sinus tenderness  * Throat: Lips, mucosa, and tongue normal; teeth and gums normal  * Neck: Supple, symmetrical, trachea midline, no adenopathy;   * Thyroid:  No enlargement/tenderness/nodules; no carotid bruit or JVD  * Back: Symmetric, no curvature, ROM normal, no CVA tenderness  * Lungs: Respirations unlabored, Good bilateral air entry, diffuse crackles bilaterally (no audible wheezes or rhonchi)  * Heart: Regular Rate and Rhythm, normal S1 and S2, no audible murmur, rub, or gallop  * Abdomen: Symmetric, non-distended, no scar, soft, non-tender, bowel sounds active all four quadrants, no masses, no organomegaly (no hepatosplenomegaly)  * Extremities: Extremities normal, atraumatic, no cyanosis, +1-2 lower extremity pitting edema bilaterally, adequate dorsalis pedis pulses  * Pulses: 2+ and symmetric all extremities  * Skin: Skin color, texture, turgor normal, no rashes or lesions  * Lymph nodes: Cervical, supraclavicular, and axillary nodes normal

## 2021-11-29 NOTE — ED ADULT NURSE NOTE - NSIMPLEMENTINTERV_GEN_ALL_ED
Implemented All Fall with Harm Risk Interventions:  [place] to call system. Call bell, personal items and telephone within reach. Instruct patient to call for assistance. Room bathroom lighting operational. Non-slip footwear when patient is off stretcher. Physically safe environment: no spills, clutter or unnecessary equipment. Stretcher in lowest position, wheels locked, appropriate side rails in place. Provide visual cue, wrist band, yellow gown, etc. Monitor gait and stability. Monitor for mental status changes and reorient to person, place, and time. Review medications for side effects contributing to fall risk. Reinforce activity limits and safety measures with patient and family. Provide visual clues: red socks.

## 2021-11-29 NOTE — H&P ADULT - ATTENDING COMMENTS
Patient seen and examined on 11/29/2021 at 23:33    HPI:  55 y/o woman with a past medical history of COPD on 4-5L O2, HFwREF, recent NSTEMI, HTN, HLD, DM, CAD s/p stenting in 6/2021, CVA with L sided weakness admitted for acute on chronic hypoxic respiratory failure. She was seen at her PMDs office today and sent in for hypoxia and respiratory distress. She ran out of portable O2 just prior to being seen in the office. She notes progressive shortness of breath after for last hospitalization on 11/17, treated for heart failure exacerbation. She was to be discharged on torsemide but reports that she didn't get it and thus has been off diuretics since discharge. During that time she notes progressive dyspnea, LE edema, and orthopnea, palpitations with exertion but no chest pain, fevers, chills, abdo pain, n/v/d, or LE pain.   Currently reports her breathing is significantly improved but her breathing has not return to baseline yet.     REVIEW OF SYSTEMS:  CONSTITUTIONAL:  No weakness, fevers, chills, night sweats, weight loss  EYES/ENT: No visual changes. No vertigo or dysphagia  NECK: No neck pain or stiffness  RESPIRATORY: No cough, wheezing, hemoptysis. +shortness of breath  CARDIOVASCULAR: No chest pain or palpitations. + lower extremity edema, orthopnea   GASTROINTESTINAL: No abdominal pain. No nausea, vomiting, diarrhea, or hematemesis  GENITOURINARY: No dysuria or hematuria   NEUROLOGICAL: No focal numbness or weakness  SKIN: No rashes or itching  HEMATOLOGIC: No easy bruising or prolonged bleeding.      PHYSICAL EXAM:  GENERAL: NAD, well-developed, chronically ill, Non-toxic, older than stated age   HEAD:  Atraumatic, Normocephalic  EYES: EOMI, Sclera White   NECK: Supple, + JVD  CHEST/LUNG: Crackles bilaterally; No wheezing, rhonchi. Breathing and speaking comfortably on 6L NC   HEART: Regular rate and rhythm; s1, s2, No murmurs, rubs, or gallops  ABDOMEN: Soft, Nontender, Nondistended; Bowel sounds present, No rebound or guarding noted   EXTREMITIES: b/l pitting lower extremity edema R>L, some rihgt calf tenderness to palpation.  No clubbing or cyanosis  PSYCH: AAOx3, pleasant, cooperative, not anxious  NEUROLOGY: lifts/moves all extremities spontaneously, no downward drift. Sensation grossly intact.   SKIN: No rashes or lesions      ASSESSMENT AND PLAN:  Acute heart failure with reduced ejection fraction exacerbation  Acute on chronic hypercapnia respiratory failure  -Cont with IV lasix 40mg qBID  -Cont tele monitoring, trend cardiac enzymes  -Cont metoprolol, entresto, spironolactone  -Strict I/O, daily weights, and monitor electrolytes carefully while aggressively diuresing.   -Hear failure team evaluation.   -Fluid restriction 1.5L     CAD s/p stent   Recent NSTEMI  HTN / HLD   Hx of CVA   Elevated trops (lower than previous admission)  -Tele monitoring, AM ekg  -Cont DAPT, metoprolol, and atorvastatin     COPD on 4-5L O2 at home  Pulm HTN  -Not in exacerbation  -Cont with symbicort and albuterol PRN     Diabetes: Basal bolus insulin, keep fingerstick glucose <180.     DVT ppx: Lovenox/Heparin  GI ppx: Not indicated  GOC: Full code.    My note supersedes the residents in the event of a discrepancy. Patient seen and examined on 11/29/2021 at 23:33    HPI:  55 y/o woman with a past medical history of COPD on 4-5L O2, HFwREF, recent NSTEMI, HTN, HLD, DM, CAD s/p stenting in 6/2021, CVA with L sided weakness admitted for acute on chronic hypoxic respiratory failure. She was seen at her PMDs office today and sent in for hypoxia and respiratory distress. She ran out of portable O2 just prior to being seen in the office. She notes progressive shortness of breath after for last hospitalization on 11/17, treated for heart failure exacerbation. She was to be discharged on torsemide but reports that she didn't get it and thus has been off diuretics since discharge. During that time she notes progressive dyspnea, LE edema, and orthopnea, palpitations with exertion but no chest pain, fevers, chills, abdo pain, n/v/d, or LE pain.   Currently reports her breathing is significantly improved but her breathing has not return to baseline yet.     REVIEW OF SYSTEMS:  CONSTITUTIONAL:  No weakness, fevers, chills, night sweats, weight loss  EYES/ENT: No visual changes. No vertigo or dysphagia  NECK: No neck pain or stiffness  RESPIRATORY: No cough, wheezing, hemoptysis. +shortness of breath  CARDIOVASCULAR: No chest pain or palpitations. + lower extremity edema, orthopnea   GASTROINTESTINAL: No abdominal pain. No nausea, vomiting, diarrhea, or hematemesis  GENITOURINARY: No dysuria or hematuria   NEUROLOGICAL: No focal numbness or weakness  SKIN: No rashes or itching  HEMATOLOGIC: No easy bruising or prolonged bleeding.      PHYSICAL EXAM:  GENERAL: NAD, well-developed, chronically ill, Non-toxic, older than stated age   HEAD:  Atraumatic, Normocephalic  EYES: EOMI, Sclera White   NECK: Supple, + JVD  CHEST/LUNG: Crackles bilaterally; No wheezing, rhonchi. Breathing and speaking comfortably on 6L NC   HEART: Regular rate and rhythm; s1, s2, No murmurs, rubs, or gallops  ABDOMEN: Soft, Nontender, Nondistended; Bowel sounds present, No rebound or guarding noted   EXTREMITIES: b/l pitting lower extremity edema R>L, some rihgt calf tenderness to palpation.  No clubbing or cyanosis  PSYCH: AAOx3, pleasant, cooperative, not anxious  NEUROLOGY: lifts/moves all extremities spontaneously, no downward drift. Sensation grossly intact.   SKIN: No rashes or lesions      ASSESSMENT AND PLAN:  Acute heart failure with reduced ejection fraction exacerbation  Acute on chronic hypercapnia respiratory failure  -Cont with IV lasix 40mg qBID  -Cont tele monitoring, trend cardiac enzymes  -Cont metoprolol, entresto, spironolactone  -Strict I/O, daily weights, and monitor electrolytes carefully while aggressively diuresing.   -Hear failure team evaluation.   -Fluid restriction 1.5L     Bradycardia: isolated on EKG, now resolved  -resume home metoprolol   -If recurrent bardycardia then will need to reduce dose     CAD s/p stent   Recent NSTEMI  HTN / HLD   Hx of CVA   Elevated trops (lower than previous admission)  -Tele monitoring, AM ekg  -Cont DAPT, metoprolol, and atorvastatin     COPD on 4-5L O2 at home  Pulm HTN  -Not in exacerbation  -Cont with symbicort and albuterol PRN     Diabetes: Basal bolus insulin, keep fingerstick glucose <180.     DVT ppx: Lovenox/Heparin  GI ppx: Not indicated  GOC: Full code.    My note supersedes the residents in the event of a discrepancy.

## 2021-11-29 NOTE — ED PROVIDER NOTE - CLINICAL SUMMARY MEDICAL DECISION MAKING FREE TEXT BOX
57 Y/O F WITH ACUTE ON CHRONIC HEART FAILURE. ALL DIAGNOSTIC TESTING REVIEWED. CXR WITH PULM VASCULAR CONGESTION. PT ADMITTED TO MEDICINE.

## 2021-11-29 NOTE — H&P ADULT - ASSESSMENT
Assessment and Plan  Case of a 56 year old female patient with multiple comorbidities including COPD on home O2, HFrEF, HTN, DL/CVA with residual left LE weakness, and DMII who presented on 11/29 for evaluation of an episode of desaturation after her O2 ran off at her PCP's clinic, found to have elevated BNP on labs and evidence of congestion on imaging, to be admitted for management of acute on chronic HFrEF decompensation. Currently hemodynamically stable.      Acute on Chronic HFrEF Decompensation  * TTE 11/14 EF 15-20%, DDII, WMA, mod MR, mild-mod TR, severe PHTN, small pericardial effusion.  * Last evaluated by Dr Buitrago on 11/15 and plan was to perform cardiac cath post diuresis.   * Recent admission 11/08-11/17 for HFrEF Exacerbation s/p HF evaluation.   * Home med Torsemide 10mg QD and supposed to be on aldactone 25mg QD, Toprol 50mg QD, Dapagliflozin 10mg QD, Entresto 49/51mg BID  * ED Vitals SaO2 99% on 4LPM, /99mmHg  * ED Labs Pro BNP 5736 (was 7542 11/08)  * CXR with evidence of congestion    - Follow up Cardiology Team Dr Buitrago as plan was to proceed with cardiac catheterization post adequate diuresis  - Follow up Heart Failure Team Dr Ge as he has evaluated patient during last admission  - Consider EP consult for AICD in setting of HFrEF  - Monitor in/out  - Monitor daily weight  - Monitor daily CXR  - Monitor SAO2 and O2 requirements: currently home requirements  - Continue IV Lasix 40mg BID. S/P IV lasix 40mg x1 dose in ED. Was on Bumex drip then torsemide and metolazone during last admission 11/08-11/17   - Fluid restrictions to 1.5L/24 hours  - Resume Entresto 49/51mg BID  - Hold beta blocker in setting of sinus bradycardia on ECG. Consider telemetry monitoring  - Resume Atorvastatin 80 mg QD and Fenofibrate 145mg QD  - Resume Aspirin 81mg QD and Plavix 75mg QD per Dr Buitrago's recommendation (note from 11/15)      Depression  * Home med Seroquel 25 QD   - Resume Seroquel 25 QD       COPD and chronic hypoxic RF  * On home O2 4LPM  * Home med Albuterol 2 puffs Q6h PRN, Symbicort 2 puffs BID  - Monitor SAO2 and O2 requirements: currently home requirements  - Duonebs PRN  - Resume Symbicort 2 puffs BID      Dyslipidemia  History of CVA with residual Left LE weakness  * Uses wheelchair at baseline  * Lipid Profile (11.10.21 @ 18:31) Cholesterol, Serum: 89 mg/dL; Triglycerides, Serum: 174 mg/dL; HDL Cholesterol, Serum: 24 mg/dL.  * Home meds Atorvastatin 80 mg QD, Aspirin 81mg QD, Plavix 75mg QD, Fenofibrate 145mg QD    - Resume Atorvastatin 80 mg QD and Fenofibrate 145mg QD  - Resume Aspirin 81mg QD and Plavix 75mg QD per Dr Buitrago's recommendation (note from 11/15)      HTN  * Home meds Toprol 50mg QD, Entresto 49/51mg BID, Torsemide 10mg QD, aldactone 25mg QD  * ED /99mmHg  - Monitor BP closely  - Resume Entresto 49/51mg BID  - IV diuretics as above  - Hold beta blocker in setting of sinus bradycardia on ECG. Consider telemetry monitoring      DM II  * Last Hba1c 7.7 11/10  * Home meds metformin 1g BID, Glipizide 10mg BID, Dapagliflozin 10mg   - Monitor POCT premeals and at bedtime  - Start Apidra sliding Scale B for now      Others  - DVT Prophylaxis: Lovenox 40mg Subcutaneously daily  - GI Prophylaxis: Pantoprazole 40mg PO QD  - Diet: DASH/ TLC  - Code Status: Full      Barriers to learning: NO  Discharge Planning: Patient will be discharged once stable   Plan was communicated with patient and medical team      Alicja Barnett MD  PGY - 2 Internal Medicine   [facility]

## 2021-11-29 NOTE — ED ADULT TRIAGE NOTE - CHIEF COMPLAINT QUOTE
Patient complaining of shortness of breath, has history of heart failure. No recent travel or sick contacts. On 4L NC.

## 2021-11-29 NOTE — ED ADULT NURSE NOTE - OBJECTIVE STATEMENT
Pt sent in by MD office for low O2 saturation. Pt states she went to MD office with her O2 tank and it ran out at the office. Pt states RN checked o2 saturation on RA, pt endorsed saturation was in the 70s on RA. Pt states she then was placed on O2 in the office and saturation went up to 97. Pt states that she wears 4L NC at baseline and has more O2 tanks at home. Denies n/v/fever/cp/sob. States she recently was dc from hospital for fluid overload.

## 2021-11-29 NOTE — ED PROVIDER NOTE - AXIS
Referred by: Yandel Sinclair MD; Medical Diagnosis (from order):    Diagnosis Information      Diagnosis    V43.65 (ICD-9-CM) - Z96.652 (ICD-10-CM) - History of total knee arthroplasty, left                Daily Treatment Note    Visit:  12     SUBJECTIVE                                                                                                             Patient reports that his mobility is feeling well and he is consistently going without an assistive device. He still has mild stiffness on the left side with bending. He was able to ambulate on the grass outside without difficulty.       OBJECTIVE                                                                                                                     Observation:   Comments / Details: Left knee AROM:  Extension 0  Flexion 120      TREATMENT                                                                                                                  Therapeutic Exercise:  Recumbent bike seat 10 through 8 - increased to full revolutions for ROM x 5 minutes for knee flexion ROM  deep squat as tolerated, without upper extremity support, 5\" holds 8 x 3  Sled push with 110# inside 25 feet x 8  Resisted walking forward and retro with orange tubing 25 feet x 2 each  10\" step up with eccentric lowering 8 x 3 left   Stepping over 10\" elias - laterally 30\" x 3    Home exercise program update      Manual Therapy:  STM along incision, scar mobility  Patellar mobility superior/inferior grade 3-4 - tolerated well      Skilled input: verbal instruction/cues and tactile instruction/cues    Writer verbally educated and received verbal consent for hand placement, positioning of patient, and techniques to be performed today from patient for clothing adjustments for techniques, therapist position for techniques and hand placement and palpation for techniques as described above and how they are pertinent to the patient's plan of care.    Home Exercise Program: Access  Code: [email or web address]  URL: [email or web address]  Date: 04/19/2021  Prepared by: Johnny Bello    Exercises  Standing Knee Flexion AROM with Chair Support - 3 x daily - 7 x weekly - 2 sets - 10 reps - 5 hold  Standing Gastroc Stretch - 2 x daily - 7 x weekly - 1 sets - 5 reps - 20 hold  Modified Yandel Stretch - 2 x daily - 7 x weekly - 1 sets - 5 reps - 30 hold  Step Up - 1 x daily - 7 x weekly - 3 sets - 10 reps - 3 hold  Single Leg Stance - 1 x daily - 7 x weekly - 2 sets - 3 reps - 20 hold  Standing Ankle Dorsiflexion Stretch on Chair - 1 x daily - 7 x weekly - 2 sets - 5 reps - 20 hold  Goblet squat with 15-20# 5-10 x 2  Split squat 5 x 2 bilaterally          ASSESSMENT                                                                                                             Patient has demonstrated at least 120 degrees of knee flexion and is improving his quadriceps strength at this time. Good SLS stability at this time.     Patient Education:   Results of above outlined education: Verbalizes understanding and Demonstrates understanding      PLAN                                                                                                                           Suggestions for next session as indicated: Progress per plan of care         Therapy procedure time and total treatment time can be found documented on the Time Entry flowsheet   Right Deviation

## 2021-11-29 NOTE — ED PROVIDER NOTE - ATTENDING CONTRIBUTION TO CARE
I personally evaluated the patient. I reviewed the Resident’s or Physician Assistant’s note (as assigned above), and agree with the findings and plan except as documented in my note.   57 Y/O F CHF (ON CONTINUOUS HOME OXYGEN), HTN, DLD, PULMONARY HTN, + SMOKER, WHEELCHAIR BOUND, MS, MOOD DISORDER, COPD, S.P PCI 6/2020, I personally evaluated the patient. I reviewed the Resident’s or Physician Assistant’s note (as assigned above), and agree with the findings and plan except as documented in my note.   57 Y/O F CHF (ON CONTINUOUS HOME OXYGEN), HTN, DLD, PULMONARY HTN, + SMOKER, WHEELCHAIR BOUND, MS, MOOD DISORDER, COPD, S/P PCI 6/2020, WITH HYPOXIA NOTED IN PULMONARY CLINC THIS AM. PT REPORTS SHE RAN OUT OF HER SUPPLEMENTAL OXYGEN THIS AM. + ASSOCIATED DIZZINESS. PT REPORTS WORSENING LE EDEMA. PT REPORTS COMPLIANCE WITH MEDICATIONS. PT WITH RECENT ADMISSION FOR ACUTE ON CHRONIC HEART FAILURE. VITALS NOTED. ALERT OX3 NAD WELL APPEARING. NCAT PERRL. EOMI. OP NORMAL. MMM. NECK SUPPLE. LUNGS WITH RHONCHI B/L. RRR. S1S2. ABD- SOFT NONTENDER. + B/L PITTING LEG EDEMA. CN 2-12 INTACT. NEURO EXAM NONFOCAL.

## 2021-11-29 NOTE — H&P ADULT - HISTORY OF PRESENT ILLNESS
History of Present Illness  Ms. Ramos is a 56 year old (smoker) female patient known to have:  - Depression. Home med Seroquel 25 QD   - COPD and chronic hypoxic RF. On home O2 4LPM. Home med Albuterol 2 puffs Q6h PRN, Symbicort 2 puffs BID  - Dyslipidemia/ History of CVA with residual Left LE weakness. Uses wheelchair at baseline. Lipid Profile (11.10.21 @ 18:31) Cholesterol, Serum: 89 mg/dL; Triglycerides, Serum: 174 mg/dL; HDL Cholesterol, Serum: 24 mg/dL. Home meds Atorvastatin 80 mg QD, Aspirin 81mg QD, Plavix 75mg QD, Fenofibrate 145mg QD  - HFrEF. TTE 11/14 EF 15-20%, DDII, WMA, mod MR, mild-mod TR, severe PHTN, small pericardial effusion. Last evaluated by Dr Buitrago on 11/15 and plan was to perform cardiac cath post diuresis. Recent admission 11/08-11/17 for HFrEF Exacerbation s/p HF evaluation. Home med Torsemide 10mg QD and supposed to be on aldactone 25mg QD, Toprol 50mg QD, Dapagliflozin 10mg QD, Entresto 49/51mg BID  - HTN. Home meds Toprol 50mg QD, Entresto 49/51mg BID, Torsemide 10mg QD, aldactone 25mg QD  - DM II. Last Hba1c 7.7 11/10. Home meds metformin 1g BID, Glipizide 10mg BID, Dapagliflozin 10mg       She presented to the ED on 11/29 following an episode of desaturation at clinic.   History goes back to this afternoon when the patient's oxygen ran off.  This was followed 5 minutes later by an episode of desaturation down to 77%.  Patient had shortness of breath along with light headedness during the episode.  She otherwise denied chest pain, palpitations, diaphoresis, or nausea.  Over the last few weeks PTP, patient has noted worsening shortness of breath at rest and on exertion.  She has also noted LE swelling, weight gain, orthopnea, and PNDs.      On review of systems, patient denies any recent fever, chills, night sweats, URTI symptoms (cough, rhinorrhea, sore throat), urinary symptoms (urinary frequency, urgency, intermittence, dysuria, foul smelling urine, cloudy urine), change in bowel movements (diarrhea or constipation), abdominal pain, headache, nausea, or vomiting.   No sick contacts.   No recent travel or exposure to recent travelers.      Upon presentation to the ED, the patient was hemodynamically stable:  Vital Signs in ED   - /99mmhg  - HR 87 bpm  - RR 22 bpm  - SaO2 99% on 4LPM      Investigations   Laboratory Workup  - CBC:                        13.1   5.90  )-----------( 286      ( 29 Nov 2021 15:10 )             40.9     - Chemistry:  11-29    138  |  100  |  18  ----------------------------<  110<H>  4.4   |  19  |  0.8    Ca    9.2      29 Nov 2021 15:10    TPro  7.2  /  Alb  3.8  /  TBili  2.1<H>  /  DBili  x   /  AST  28  /  ALT  18  /  AlkPhos  103  11-29    Pro BNP 5736    - Cardiac Markers:  CARDIAC MARKERS ( 29 Nov 2021 15:10 )  x     / 0.05 ng/mL / x     / x     / x          - Patient was given IV Lasix 40mg x1 dose now  - Will follow urine output in the coming 4-6 hours  - Patient will be admitted for further investigations, monitoring, and management

## 2021-11-30 LAB
ALBUMIN SERPL ELPH-MCNC: 3.7 G/DL — SIGNIFICANT CHANGE UP (ref 3.5–5.2)
ALP SERPL-CCNC: 115 U/L — SIGNIFICANT CHANGE UP (ref 30–115)
ALT FLD-CCNC: 16 U/L — SIGNIFICANT CHANGE UP (ref 0–41)
ANION GAP SERPL CALC-SCNC: 16 MMOL/L — HIGH (ref 7–14)
ANION GAP SERPL CALC-SCNC: 18 MMOL/L — HIGH (ref 7–14)
AST SERPL-CCNC: 21 U/L — SIGNIFICANT CHANGE UP (ref 0–41)
BASOPHILS # BLD AUTO: 0.17 K/UL — SIGNIFICANT CHANGE UP (ref 0–0.2)
BASOPHILS NFR BLD AUTO: 3.5 % — HIGH (ref 0–1)
BILIRUB SERPL-MCNC: 1.5 MG/DL — HIGH (ref 0.2–1.2)
BUN SERPL-MCNC: 17 MG/DL — SIGNIFICANT CHANGE UP (ref 10–20)
BUN SERPL-MCNC: 19 MG/DL — SIGNIFICANT CHANGE UP (ref 10–20)
CALCIUM SERPL-MCNC: 9 MG/DL — SIGNIFICANT CHANGE UP (ref 8.5–10.1)
CALCIUM SERPL-MCNC: 9 MG/DL — SIGNIFICANT CHANGE UP (ref 8.5–10.1)
CHLORIDE SERPL-SCNC: 100 MMOL/L — SIGNIFICANT CHANGE UP (ref 98–110)
CHLORIDE SERPL-SCNC: 99 MMOL/L — SIGNIFICANT CHANGE UP (ref 98–110)
CO2 SERPL-SCNC: 18 MMOL/L — SIGNIFICANT CHANGE UP (ref 17–32)
CO2 SERPL-SCNC: 20 MMOL/L — SIGNIFICANT CHANGE UP (ref 17–32)
CREAT SERPL-MCNC: 1 MG/DL — SIGNIFICANT CHANGE UP (ref 0.7–1.5)
CREAT SERPL-MCNC: 1 MG/DL — SIGNIFICANT CHANGE UP (ref 0.7–1.5)
EOSINOPHIL # BLD AUTO: 0.13 K/UL — SIGNIFICANT CHANGE UP (ref 0–0.7)
EOSINOPHIL NFR BLD AUTO: 2.6 % — SIGNIFICANT CHANGE UP (ref 0–8)
GLUCOSE BLDC GLUCOMTR-MCNC: 106 MG/DL — HIGH (ref 70–99)
GLUCOSE BLDC GLUCOMTR-MCNC: 120 MG/DL — HIGH (ref 70–99)
GLUCOSE BLDC GLUCOMTR-MCNC: 168 MG/DL — HIGH (ref 70–99)
GLUCOSE BLDC GLUCOMTR-MCNC: 180 MG/DL — HIGH (ref 70–99)
GLUCOSE SERPL-MCNC: 183 MG/DL — HIGH (ref 70–99)
GLUCOSE SERPL-MCNC: 86 MG/DL — SIGNIFICANT CHANGE UP (ref 70–99)
HCT VFR BLD CALC: 39.3 % — SIGNIFICANT CHANGE UP (ref 37–47)
HGB BLD-MCNC: 12.6 G/DL — SIGNIFICANT CHANGE UP (ref 12–16)
LYMPHOCYTES # BLD AUTO: 1.18 K/UL — LOW (ref 1.2–3.4)
LYMPHOCYTES # BLD AUTO: 24.6 % — SIGNIFICANT CHANGE UP (ref 20.5–51.1)
MAGNESIUM SERPL-MCNC: 1.8 MG/DL — SIGNIFICANT CHANGE UP (ref 1.8–2.4)
MCHC RBC-ENTMCNC: 28.3 PG — SIGNIFICANT CHANGE UP (ref 27–31)
MCHC RBC-ENTMCNC: 32.1 G/DL — SIGNIFICANT CHANGE UP (ref 32–37)
MCV RBC AUTO: 88.3 FL — SIGNIFICANT CHANGE UP (ref 81–99)
MONOCYTES # BLD AUTO: 0.13 K/UL — SIGNIFICANT CHANGE UP (ref 0.1–0.6)
MONOCYTES NFR BLD AUTO: 2.6 % — SIGNIFICANT CHANGE UP (ref 1.7–9.3)
NEUTROPHILS # BLD AUTO: 3.08 K/UL — SIGNIFICANT CHANGE UP (ref 1.4–6.5)
NEUTROPHILS NFR BLD AUTO: 64.1 % — SIGNIFICANT CHANGE UP (ref 42.2–75.2)
PHOSPHATE SERPL-MCNC: 3.7 MG/DL — SIGNIFICANT CHANGE UP (ref 2.1–4.9)
PLATELET # BLD AUTO: 265 K/UL — SIGNIFICANT CHANGE UP (ref 130–400)
POTASSIUM SERPL-MCNC: 4 MMOL/L — SIGNIFICANT CHANGE UP (ref 3.5–5)
POTASSIUM SERPL-MCNC: 4 MMOL/L — SIGNIFICANT CHANGE UP (ref 3.5–5)
POTASSIUM SERPL-SCNC: 4 MMOL/L — SIGNIFICANT CHANGE UP (ref 3.5–5)
POTASSIUM SERPL-SCNC: 4 MMOL/L — SIGNIFICANT CHANGE UP (ref 3.5–5)
PROT SERPL-MCNC: 6.9 G/DL — SIGNIFICANT CHANGE UP (ref 6–8)
RBC # BLD: 4.45 M/UL — SIGNIFICANT CHANGE UP (ref 4.2–5.4)
RBC # FLD: 17 % — HIGH (ref 11.5–14.5)
SODIUM SERPL-SCNC: 135 MMOL/L — SIGNIFICANT CHANGE UP (ref 135–146)
SODIUM SERPL-SCNC: 136 MMOL/L — SIGNIFICANT CHANGE UP (ref 135–146)
TSH SERPL-MCNC: 3.64 UIU/ML — SIGNIFICANT CHANGE UP (ref 0.27–4.2)
WBC # BLD: 4.81 K/UL — SIGNIFICANT CHANGE UP (ref 4.8–10.8)
WBC # FLD AUTO: 4.81 K/UL — SIGNIFICANT CHANGE UP (ref 4.8–10.8)

## 2021-11-30 PROCEDURE — 93970 EXTREMITY STUDY: CPT | Mod: 26

## 2021-11-30 PROCEDURE — 71045 X-RAY EXAM CHEST 1 VIEW: CPT | Mod: 26

## 2021-11-30 PROCEDURE — 99223 1ST HOSP IP/OBS HIGH 75: CPT

## 2021-11-30 PROCEDURE — 99233 SBSQ HOSP IP/OBS HIGH 50: CPT

## 2021-11-30 PROCEDURE — 99222 1ST HOSP IP/OBS MODERATE 55: CPT

## 2021-11-30 RX ORDER — INFLUENZA VIRUS VACCINE 15; 15; 15; 15 UG/.5ML; UG/.5ML; UG/.5ML; UG/.5ML
0.5 SUSPENSION INTRAMUSCULAR ONCE
Refills: 0 | Status: DISCONTINUED | OUTPATIENT
Start: 2021-11-30 | End: 2021-12-07

## 2021-11-30 RX ORDER — METOPROLOL TARTRATE 50 MG
50 TABLET ORAL DAILY
Refills: 0 | Status: DISCONTINUED | OUTPATIENT
Start: 2021-11-30 | End: 2021-12-07

## 2021-11-30 RX ORDER — SODIUM CHLORIDE 5 G/100ML
150 INJECTION, SOLUTION INTRAVENOUS
Refills: 0 | Status: DISCONTINUED | OUTPATIENT
Start: 2021-12-01 | End: 2021-12-04

## 2021-11-30 RX ORDER — BUMETANIDE 0.25 MG/ML
2 INJECTION INTRAMUSCULAR; INTRAVENOUS ONCE
Refills: 0 | Status: COMPLETED | OUTPATIENT
Start: 2021-11-30 | End: 2021-11-30

## 2021-11-30 RX ORDER — BUMETANIDE 0.25 MG/ML
1 INJECTION INTRAMUSCULAR; INTRAVENOUS
Qty: 20 | Refills: 0 | Status: DISCONTINUED | OUTPATIENT
Start: 2021-11-30 | End: 2021-12-04

## 2021-11-30 RX ORDER — SODIUM CHLORIDE 5 G/100ML
150 INJECTION, SOLUTION INTRAVENOUS
Refills: 0 | Status: DISCONTINUED | OUTPATIENT
Start: 2021-12-01 | End: 2021-12-01

## 2021-11-30 RX ORDER — SODIUM CHLORIDE 5 G/100ML
150 INJECTION, SOLUTION INTRAVENOUS
Refills: 0 | Status: COMPLETED | OUTPATIENT
Start: 2021-11-30 | End: 2021-11-30

## 2021-11-30 RX ORDER — SPIRONOLACTONE 25 MG/1
25 TABLET, FILM COATED ORAL DAILY
Refills: 0 | Status: DISCONTINUED | OUTPATIENT
Start: 2021-11-30 | End: 2021-12-07

## 2021-11-30 RX ORDER — METOPROLOL TARTRATE 50 MG
25 TABLET ORAL
Refills: 0 | Status: DISCONTINUED | OUTPATIENT
Start: 2021-11-30 | End: 2021-11-30

## 2021-11-30 RX ADMIN — BUDESONIDE AND FORMOTEROL FUMARATE DIHYDRATE 2 PUFF(S): 160; 4.5 AEROSOL RESPIRATORY (INHALATION) at 12:20

## 2021-11-30 RX ADMIN — ATORVASTATIN CALCIUM 80 MILLIGRAM(S): 80 TABLET, FILM COATED ORAL at 21:45

## 2021-11-30 RX ADMIN — QUETIAPINE FUMARATE 25 MILLIGRAM(S): 200 TABLET, FILM COATED ORAL at 21:45

## 2021-11-30 RX ADMIN — SACUBITRIL AND VALSARTAN 1 TABLET(S): 24; 26 TABLET, FILM COATED ORAL at 05:25

## 2021-11-30 RX ADMIN — Medication 2: at 08:39

## 2021-11-30 RX ADMIN — SODIUM CHLORIDE 50 MILLILITER(S): 5 INJECTION, SOLUTION INTRAVENOUS at 18:01

## 2021-11-30 RX ADMIN — Medication 145 MILLIGRAM(S): at 12:38

## 2021-11-30 RX ADMIN — BUMETANIDE 2 MILLIGRAM(S): 0.25 INJECTION INTRAMUSCULAR; INTRAVENOUS at 15:53

## 2021-11-30 RX ADMIN — CHLORHEXIDINE GLUCONATE 1 APPLICATION(S): 213 SOLUTION TOPICAL at 05:25

## 2021-11-30 RX ADMIN — Medication 40 MILLIGRAM(S): at 05:25

## 2021-11-30 RX ADMIN — SACUBITRIL AND VALSARTAN 1 TABLET(S): 24; 26 TABLET, FILM COATED ORAL at 17:10

## 2021-11-30 RX ADMIN — PANTOPRAZOLE SODIUM 40 MILLIGRAM(S): 20 TABLET, DELAYED RELEASE ORAL at 05:31

## 2021-11-30 RX ADMIN — CLOPIDOGREL BISULFATE 75 MILLIGRAM(S): 75 TABLET, FILM COATED ORAL at 12:39

## 2021-11-30 RX ADMIN — Medication 81 MILLIGRAM(S): at 12:38

## 2021-11-30 RX ADMIN — BUDESONIDE AND FORMOTEROL FUMARATE DIHYDRATE 2 PUFF(S): 160; 4.5 AEROSOL RESPIRATORY (INHALATION) at 20:46

## 2021-11-30 RX ADMIN — BUMETANIDE 5 MG/HR: 0.25 INJECTION INTRAMUSCULAR; INTRAVENOUS at 15:54

## 2021-11-30 RX ADMIN — Medication 50 MILLIGRAM(S): at 05:31

## 2021-11-30 RX ADMIN — ENOXAPARIN SODIUM 40 MILLIGRAM(S): 100 INJECTION SUBCUTANEOUS at 12:38

## 2021-11-30 RX ADMIN — Medication 2: at 12:20

## 2021-11-30 NOTE — PROGRESS NOTE ADULT - ATTENDING COMMENTS
Patient c/o dyspnea, remains hypoxic, post CHF specialist eval, given 1 dose of bumex and 3%NS for diuresis tx., patient to be transferred to telemetry unit.    56 year old female patient with multiple comorbidities including COPD on home O2, HFrEF, HTN, DL/CVA with residual left LE weakness, and DMII who presented with progressive dyspnea and hypoxia  due to  acute on chronic HFrEF decompensation.     # Acute on chronic CHF exacerbation/recurrent readmission for CHF  -TTE 11/14 EF 15-20%, DDII, WMA, mod MR, mild-mod TR, severe PHTN, small pericardial effusion  -as per CHF specialist - started on hypertonic saline with IV bumex drip challenge   -strict I and O chart, CXR- pulmonary congestion, trop 0.05 due to CHF exacerbation   -transfer to tele  -daily weight, fluid restriction  -continue toprol XL, entresto, spironolactone- monitor b/p    # Acute on chronic hypoxia /h/o COPD  - now on 4 L sat 96%- will taper down goal of pulse ox 89-92%  -nebs/inhalers tx.     # HTN- continue meds tx. as above    # h/o CVA/Dyslipidemia- stable - continue statins tx.     # DM 2 - bsfs with insulin co. prn    dVT proph- lovenox subc. tx.    code status: full code    #Progress Note Handoff: transfer to telemetry unit, continue bumex drip, strict I and O , repeaet CXR in am  Family discussion: medical plan of care d/w pt. by bedside Disposition: Home__once medically stable    Total time spent to complete patient's bedside assessment, review medical chart, discuss medical plan of care with covering medical team was more than 35 minutes

## 2021-11-30 NOTE — CONSULT NOTE ADULT - SUBJECTIVE AND OBJECTIVE BOX
Cardiologist: Joo Buitrago    HPI:  History of Present Illness  Ms. Ramos is a 56 year old (smoker) female patient known to have:  - Depression. Home med Seroquel 25 QD   - COPD and chronic hypoxic RF. On home O2 4LPM. Home med Albuterol 2 puffs Q6h PRN, Symbicort 2 puffs BID  - Dyslipidemia/ History of CVA with residual Left LE weakness. Uses wheelchair at baseline. Lipid Profile (11.10.21 @ 18:31) Cholesterol, Serum: 89 mg/dL; Triglycerides, Serum: 174 mg/dL; HDL Cholesterol, Serum: 24 mg/dL. Home meds Atorvastatin 80 mg QD, Aspirin 81mg QD, Plavix 75mg QD, Fenofibrate 145mg QD  - HFrEF. TTE 11/14 EF 15-20%, DDII, WMA, mod MR, mild-mod TR, severe PHTN, small pericardial effusion. Last evaluated by Dr Buitrago on 11/15 and plan was to perform cardiac cath post diuresis. Recent admission 11/08-11/17 for HFrEF Exacerbation s/p HF evaluation. Home med Torsemide 10mg QD and supposed to be on aldactone 25mg QD, Toprol 50mg QD, Dapagliflozin 10mg QD, Entresto 49/51mg BID  - HTN. Home meds Toprol 50mg QD, Entresto 49/51mg BID, Torsemide 10mg QD, aldactone 25mg QD  - DM II. Last Hba1c 7.7 11/10. Home meds metformin 1g BID, Glipizide 10mg BID, Dapagliflozin 10mg   She presented to the ED on 11/29 following an episode of desaturation at clinic.   History goes back to this afternoon when the patient's oxygen ran off.  This was followed 5 minutes later by an episode of desaturation down to 77%.  Patient had shortness of breath along with light headedness during the episode.  She otherwise denied chest pain, palpitations, diaphoresis, or nausea.  Over the last few weeks PTP, patient has noted worsening shortness of breath at rest and on exertion.  She has also noted LE swelling, weight gain, orthopnea, and PNDs.  On review of systems, patient denies any recent fever, chills, night sweats, URTI symptoms (cough, rhinorrhea, sore throat), urinary symptoms (urinary frequency, urgency, intermittence, dysuria, foul smelling urine, cloudy urine), change in bowel movements (diarrhea or constipation), abdominal pain, headache, nausea, or vomiting.   No sick contacts.   No recent travel or exposure to recent travelers.     - Patient was given IV Lasix 40mg x1 dose now  - Will follow urine output in the coming 4-6 hours  - Patient will be admitted for further investigations, monitoring, and management     (29 Nov 2021 19:35)      PAST MEDICAL & SURGICAL HISTORY  Hypertension    Hyperlipidemia    Anxiety and depression    COPD, severe    CHF (congestive heart failure)    Cerebrovascular accident (CVA)  Multiple    Type 2 diabetes mellitus    CKD (chronic kidney disease), stage II    No significant past surgical history    FAMILY HISTORY:  FAMILY HISTORY:    [ ] no pertinent family history of premature cardiovascular disease in first degree relatives.  Mother:   Father:   Siblings:     SOCIAL HISTORY:  [X]smoker  []Alcohol  []Drug    ALLERGIES:  No Known Allergies    MEDICATIONS:  MEDICATIONS  (STANDING):  aspirin enteric coated 81 milliGRAM(s) Oral daily  atorvastatin 80 milliGRAM(s) Oral at bedtime  budesonide 160 MICROgram(s)/formoterol 4.5 MICROgram(s) Inhaler 2 Puff(s) Inhalation two times a day  chlorhexidine 4% Liquid 1 Application(s) Topical <User Schedule>  clopidogrel Tablet 75 milliGRAM(s) Oral daily  dextrose 40% Gel 15 Gram(s) Oral once  dextrose 5%. 1000 milliLiter(s) (50 mL/Hr) IV Continuous <Continuous>  dextrose 5%. 1000 milliLiter(s) (100 mL/Hr) IV Continuous <Continuous>  dextrose 50% Injectable 25 Gram(s) IV Push once  dextrose 50% Injectable 12.5 Gram(s) IV Push once  dextrose 50% Injectable 25 Gram(s) IV Push once  enoxaparin Injectable 40 milliGRAM(s) SubCutaneous daily  fenofibrate Tablet 145 milliGRAM(s) Oral daily  furosemide   Injectable 40 milliGRAM(s) IV Push every 12 hours  furosemide   Injectable 40 milliGRAM(s) IV Push once  glucagon  Injectable 1 milliGRAM(s) IntraMuscular once  insulin lispro (ADMELOG) corrective regimen sliding scale   SubCutaneous three times a day before meals  pantoprazole    Tablet 40 milliGRAM(s) Oral before breakfast  QUEtiapine 25 milliGRAM(s) Oral at bedtime  sacubitril 49 mG/valsartan 51 mG 1 Tablet(s) Oral two times a day    MEDICATIONS  (PRN):  ALBUTerol    90 MICROgram(s) HFA Inhaler 2 Puff(s) Inhalation every 6 hours PRN Shortness of Breath and/or Wheezing    HOME MEDICATIONS:  Home Medications:  Albuterol (Eqv-ProAir HFA) 90 mcg/inh inhalation aerosol: 2 puff(s) inhaled every 6 hours, As Needed (07 Jun 2021 11:22)  aspirin 81 mg oral tablet: 1 tab(s) orally once a day (07 Jun 2021 11:22)  fenofibrate 145 mg oral tablet: 1 tab(s) orally once a day (07 Jun 2021 11:22)  glipiZIDE 10 mg oral tablet: 1 tab(s) orally 2 times a day (07 Jun 2021 11:22)  Lovaza 1000 mg oral capsule: 2 cap(s) orally 2 times a day (08 Nov 2021 14:52)  magnesium oxide 400 mg oral tablet: 1 tab(s) orally 3 times a day (with meals) (16 Nov 2021 12:10)  metFORMIN 1000 mg oral tablet: 1 tab(s) orally 2 times a day Do not take until 6/23 (22 Jun 2021 12:58)  pantoprazole 40 mg oral delayed release tablet: 1 tab(s) orally once a day (before a meal) (16 Nov 2021 12:10)  Plavix 75 mg oral tablet: 1 tab(s) orally once a day (07 Jun 2021 11:22)  QUEtiapine 25 mg oral tablet: 1 tab(s) orally once a day (at bedtime) (16 Nov 2021 12:10)  Vitamin D2 1.25 mg (50,000 intl units) oral capsule: 1 cap(s) orally once a week (08 Nov 2021 14:52)      VITALS:   T(F): 96.4 (11-29 @ 19:36), Max: 97 (11-29 @ 14:09)  HR: 83 (11-29 @ 19:36) (83 - 87)  BP: 144/83 (11-29 @ 19:36) (144/83 - 163/99)  BP(mean): --  RR: 18 (11-29 @ 19:36) (18 - 22)  SpO2: 95% (11-29 @ 19:36) (95% - 99%)    I&O's Summary      REVIEW OF SYSTEMS:  see HPI    PHYSICAL EXAM:  NEURO: patient is awake , alert and oriented  GEN: Not in acute distress  NECK: no thyroid enlargement, no JVD  LUNGS: Clear to auscultation bilaterally   CARDIOVASCULAR: S1/S2 present, RRR , no murmurs or rubs, no carotid bruits,  + PP bilaterally  ABD: Soft, non-tender, non-distended, +BS  EXT: No LILIA  SKIN: Intact    LABS:                        13.1   5.90  )-----------( 286      ( 29 Nov 2021 15:10 )             40.9     11-29    138  |  100  |  18  ----------------------------<  110<H>  4.4   |  19  |  0.8    Ca    9.2      29 Nov 2021 15:10    TPro  7.2  /  Alb  3.8  /  TBili  2.1<H>  /  DBili  x   /  AST  28  /  ALT  18  /  AlkPhos  103  11-29      Lactate, Blood: 1.5 mmol/L (11-29-21 @ 15:10)  Troponin T, Serum: 0.05 ng/mL *HH* (11-29-21 @ 15:10)    CARDIAC MARKERS ( 29 Nov 2021 15:10 )  x     / 0.05 ng/mL / x     / x     / x        Serum Pro-Brain Natriuretic Peptide: 5736 pg/mL (11-29-21 @ 15:10)    11-10 Chol 89 LDL -- HDL 24<L> Trig 174<H>    RADIOLOGY:  -CXR:  < from: Xray Chest 1 View- PORTABLE-Routine (Xray Chest 1 View- PORTABLE-Routine in AM.) (11.17.21 @ 05:59) >  FINDINGS/  IMPRESSION:    Unchanged bilateral opacities. No pneumothorax.    Stable cardiomediastinal silhouette. Unchanged osseous structures.    < end of copied text >    -TTE:  < from: TTE Echo Complete w/o Contrast w/ Doppler (11.14.21 @ 08:13) >  Summary:   1. Left ventricular ejection fraction, by visual estimation, is 15-20%.   2. Severely decreased global left ventricular systolic function.   3. Multiple left ventricular regional wall motion abnormalities exist. See wall motion findings.   4. Mild concentric left ventricular hypertrophy.   5. Spectral Doppler shows pseudonormal pattern of left ventricular myocardial filling (Grade II diastolic dysfunction).   6. Mildly enlarged left atrium.   7. Mildly enlarged right atrium.   8. Sclerotic aortic valve with normal opening.   9. Mild aortic regurgitation.  10. The mitral valve leaflets are tethered due to reduced systolic function and elevated LVDP.  11. Moderate mitral regurgitation.  12. Mild-to-moderate tricuspid regurgitation.  13. Estimated pulmonary artery systolic pressure is 60.2 mmHg assuming a right atrial pressure of 15 mmHg, which is consistent with severe pulmonary hypertension.  14. Small pericardial effusion adjacent to the right atrium.    < end of copied text >    -CCTA:  < from: CT Angio Chest PE Protocol w/ IV Cont (11.10.21 @ 02:59) >  IMPRESSION:    1. No central pulmonary embolism.    2. Findings compatible with interstitial pulmonary edema with associated small bilateral pleural effusions.    < end of copied text >    -STRESS TEST:  < from: NM Nuclear Stress Pharmacologic Multiple (06.16.21 @ 12:58) >  Impression:  1. MODERATE REVERSIBLE DEFECT IN THE SEPTUM, ANTERIOR AND INFERIOR WALL OF THE LEFT VENTRICLE CONSISTENT WITH ISCHEMIA.  2. DILATED LEFT VENTRICLE WITH MARKED GLOBAL HYPOKINESIS. THERE IS INCOMPLETE THICKENING OF THE SEPTUM ANTERIOR AND INFERIOR WALL OF THE LEFT VENTRICLE RAISING QUESTION OF HIBERNATING MYOCARDIUM. THE LATERAL WALL OF THE LEFT VENTRICLE THICKENS NORMALLY.  3. LEFT VENTRICULAR EJECTION FRACTION OF <20 % WHICH IS VERY LOW. ECHOCARDIOGRAPHIC ESTIMATED EJECTION FRACTION 6/14/2021 WAS 35 TO 40%.    < end of copied text >    -CATHETERIZATION:  < from: Cardiac Cath Lab - Adult (06.21.21 @ 11:51) >  CORONARY CIRCULATION: The coronary circulation is right dominant. There was    severe 1-vessel coronary artery disease (RCA). Left main: Normal. LAD:    Angiography showed mild atherosclerosis with no flow limiting lesions. 1st    diagonal: Angiography showed mild atherosclerosis with no flow limiting    lesions. Circumflex: Angiography showed minor luminal irregularities with    no flow limiting lesions. 1st obtuse marginal: Normal. Proximal RCA:    Angiography showed mild atherosclerosisMid RCA: There was a diffuse 90 %    stenosis at a site with no prior intervention. This is a likely culprit    for the patient's clinical presentation. An intervention was performed.    Distal RCA: Angiography showed minor luminal irregularities with no flow    limiting lesions. Right PDA: Normal.    < end of copied text >    ECG:  < from: 12 Lead ECG (11.29.21 @ 15:41) >  Ventricular Rate 48 BPM    Atrial Rate 48 BPM    P-R Interval 142 ms    QRS Duration 90 ms    Q-T Interval 448 ms    QTC Calculation(Bazett) 400 ms    P Axis 22 degrees    R Axis 37 degrees    T Axis -13 degrees    Diagnosis Line Sinus bradycardia  Inferior infarct , age undetermined  Abnormal ECG    < end of copied text >    TELEMETRY EVENTS: n/s   Cardiologist: Joo Buitrago    HPI:    History of Present Illness  Ms. Ramos is a 56 year old (smoker) female patient known to have:  - Depression. Home med Seroquel 25 QD   - COPD and chronic hypoxic RF. On home O2 4LPM. Home med Albuterol 2 puffs Q6h PRN, Symbicort 2 puffs BID  - Dyslipidemia/ History of CVA with residual Left LE weakness. Uses wheelchair at baseline. Lipid Profile (11.10.21 @ 18:31) Cholesterol, Serum: 89 mg/dL; Triglycerides, Serum: 174 mg/dL; HDL Cholesterol, Serum: 24 mg/dL. Home meds Atorvastatin 80 mg QD, Aspirin 81mg QD, Plavix 75mg QD, Fenofibrate 145mg QD  - HFrEF. TTE 11/14 EF 15-20%, DDII, WMA, mod MR, mild-mod TR, severe PHTN, small pericardial effusion. Last evaluated by Dr Buitrago on 11/15 and plan was to perform cardiac cath post diuresis. Recent admission 11/08-11/17 for HFrEF Exacerbation s/p HF evaluation. Home med Torsemide 10mg QD and supposed to be on aldactone 25mg QD, Toprol 50mg QD, Dapagliflozin 10mg QD, Entresto 49/51mg BID  - HTN. Home meds Toprol 50mg QD, Entresto 49/51mg BID, Torsemide 10mg QD, aldactone 25mg QD  - DM II. Last Hba1c 7.7 11/10. Home meds metformin 1g BID, Glipizide 10mg BID, Dapagliflozin 10mg   She presented to the ED on 11/29 following an episode of desaturation at clinic.   History goes back to this afternoon when the patient's oxygen ran off.  This was followed 5 minutes later by an episode of desaturation down to 77%.  Patient had shortness of breath along with light headedness during the episode.  She otherwise denied chest pain, palpitations, diaphoresis, or nausea.  Over the last few weeks PTP, patient has noted worsening shortness of breath at rest and on exertion.  She has also noted LE swelling, weight gain, orthopnea, and PNDs.  On review of systems, patient denies any recent fever, chills, night sweats, URTI symptoms (cough, rhinorrhea, sore throat), urinary symptoms (urinary frequency, urgency, intermittence, dysuria, foul smelling urine, cloudy urine), change in bowel movements (diarrhea or constipation), abdominal pain, headache, nausea, or vomiting.   No sick contacts.   No recent travel or exposure to recent travelers.     - Patient was given IV Lasix 40mg x1 dose now  - Will follow urine output in the coming 4-6 hours  - Patient will be admitted for further investigations, monitoring, and management     (29 Nov 2021 19:35)      PAST MEDICAL & SURGICAL HISTORY  Hypertension    Hyperlipidemia    Anxiety and depression    COPD, severe    CHF (congestive heart failure)    Cerebrovascular accident (CVA)  Multiple    Type 2 diabetes mellitus    CKD (chronic kidney disease), stage II    No significant past surgical history    FAMILY HISTORY:  FAMILY HISTORY:    [ ] no pertinent family history of premature cardiovascular disease in first degree relatives.  Mother:   Father:   Siblings:     SOCIAL HISTORY:  [X]smoker  []Alcohol  []Drug    ALLERGIES:  No Known Allergies    MEDICATIONS:  MEDICATIONS  (STANDING):  aspirin enteric coated 81 milliGRAM(s) Oral daily  atorvastatin 80 milliGRAM(s) Oral at bedtime  budesonide 160 MICROgram(s)/formoterol 4.5 MICROgram(s) Inhaler 2 Puff(s) Inhalation two times a day  chlorhexidine 4% Liquid 1 Application(s) Topical <User Schedule>  clopidogrel Tablet 75 milliGRAM(s) Oral daily  dextrose 40% Gel 15 Gram(s) Oral once  dextrose 5%. 1000 milliLiter(s) (50 mL/Hr) IV Continuous <Continuous>  dextrose 5%. 1000 milliLiter(s) (100 mL/Hr) IV Continuous <Continuous>  dextrose 50% Injectable 25 Gram(s) IV Push once  dextrose 50% Injectable 12.5 Gram(s) IV Push once  dextrose 50% Injectable 25 Gram(s) IV Push once  enoxaparin Injectable 40 milliGRAM(s) SubCutaneous daily  fenofibrate Tablet 145 milliGRAM(s) Oral daily  furosemide   Injectable 40 milliGRAM(s) IV Push every 12 hours  furosemide   Injectable 40 milliGRAM(s) IV Push once  glucagon  Injectable 1 milliGRAM(s) IntraMuscular once  insulin lispro (ADMELOG) corrective regimen sliding scale   SubCutaneous three times a day before meals  pantoprazole    Tablet 40 milliGRAM(s) Oral before breakfast  QUEtiapine 25 milliGRAM(s) Oral at bedtime  sacubitril 49 mG/valsartan 51 mG 1 Tablet(s) Oral two times a day    MEDICATIONS  (PRN):  ALBUTerol    90 MICROgram(s) HFA Inhaler 2 Puff(s) Inhalation every 6 hours PRN Shortness of Breath and/or Wheezing    HOME MEDICATIONS:  Home Medications:  Albuterol (Eqv-ProAir HFA) 90 mcg/inh inhalation aerosol: 2 puff(s) inhaled every 6 hours, As Needed (07 Jun 2021 11:22)  aspirin 81 mg oral tablet: 1 tab(s) orally once a day (07 Jun 2021 11:22)  fenofibrate 145 mg oral tablet: 1 tab(s) orally once a day (07 Jun 2021 11:22)  glipiZIDE 10 mg oral tablet: 1 tab(s) orally 2 times a day (07 Jun 2021 11:22)  Lovaza 1000 mg oral capsule: 2 cap(s) orally 2 times a day (08 Nov 2021 14:52)  magnesium oxide 400 mg oral tablet: 1 tab(s) orally 3 times a day (with meals) (16 Nov 2021 12:10)  metFORMIN 1000 mg oral tablet: 1 tab(s) orally 2 times a day Do not take until 6/23 (22 Jun 2021 12:58)  pantoprazole 40 mg oral delayed release tablet: 1 tab(s) orally once a day (before a meal) (16 Nov 2021 12:10)  Plavix 75 mg oral tablet: 1 tab(s) orally once a day (07 Jun 2021 11:22)  QUEtiapine 25 mg oral tablet: 1 tab(s) orally once a day (at bedtime) (16 Nov 2021 12:10)  Vitamin D2 1.25 mg (50,000 intl units) oral capsule: 1 cap(s) orally once a week (08 Nov 2021 14:52)      VITALS:     T(F): 96.4 (11-29 @ 19:36), Max: 97 (11-29 @ 14:09)  HR: 83 (11-29 @ 19:36) (83 - 87)  BP: 144/83 (11-29 @ 19:36) (144/83 - 163/99)  BP(mean): --  RR: 18 (11-29 @ 19:36) (18 - 22)  SpO2: 95% (11-29 @ 19:36) (95% - 99%)    I&O's Summary      REVIEW OF SYSTEMS:  see HPI    PHYSICAL EXAM:    NEURO: patient is awake , alert and oriented  GEN: Not in acute distress  NECK: no thyroid enlargement, no JVD  LUNGS: Clear to auscultation bilaterally   CARDIOVASCULAR: S1/S2 present, RRR , no murmurs or rubs, no carotid bruits,  + PP bilaterally  ABD: Soft, non-tender, non-distended, +BS  EXT: No LILIA  SKIN: Intact    LABS:                        13.1   5.90  )-----------( 286      ( 29 Nov 2021 15:10 )             40.9     11-29    138  |  100  |  18  ----------------------------<  110<H>  4.4   |  19  |  0.8    Ca    9.2      29 Nov 2021 15:10    TPro  7.2  /  Alb  3.8  /  TBili  2.1<H>  /  DBili  x   /  AST  28  /  ALT  18  /  AlkPhos  103  11-29      Lactate, Blood: 1.5 mmol/L (11-29-21 @ 15:10)  Troponin T, Serum: 0.05 ng/mL *HH* (11-29-21 @ 15:10)    CARDIAC MARKERS ( 29 Nov 2021 15:10 )  x     / 0.05 ng/mL / x     / x     / x        Serum Pro-Brain Natriuretic Peptide: 5736 pg/mL (11-29-21 @ 15:10)    11-10 Chol 89 LDL -- HDL 24<L> Trig 174<H>    RADIOLOGY:  -CXR:  < from: Xray Chest 1 View- PORTABLE-Routine (Xray Chest 1 View- PORTABLE-Routine in AM.) (11.17.21 @ 05:59) >  FINDINGS/  IMPRESSION:    Unchanged bilateral opacities. No pneumothorax.    Stable cardiomediastinal silhouette. Unchanged osseous structures.    < end of copied text >    -TTE:  < from: TTE Echo Complete w/o Contrast w/ Doppler (11.14.21 @ 08:13) >  Summary:   1. Left ventricular ejection fraction, by visual estimation, is 15-20%.   2. Severely decreased global left ventricular systolic function.   3. Multiple left ventricular regional wall motion abnormalities exist. See wall motion findings.   4. Mild concentric left ventricular hypertrophy.   5. Spectral Doppler shows pseudonormal pattern of left ventricular myocardial filling (Grade II diastolic dysfunction).   6. Mildly enlarged left atrium.   7. Mildly enlarged right atrium.   8. Sclerotic aortic valve with normal opening.   9. Mild aortic regurgitation.  10. The mitral valve leaflets are tethered due to reduced systolic function and elevated LVDP.  11. Moderate mitral regurgitation.  12. Mild-to-moderate tricuspid regurgitation.  13. Estimated pulmonary artery systolic pressure is 60.2 mmHg assuming a right atrial pressure of 15 mmHg, which is consistent with severe pulmonary hypertension.  14. Small pericardial effusion adjacent to the right atrium.    < end of copied text >    -CCTA:  < from: CT Angio Chest PE Protocol w/ IV Cont (11.10.21 @ 02:59) >  IMPRESSION:    1. No central pulmonary embolism.    2. Findings compatible with interstitial pulmonary edema with associated small bilateral pleural effusions.    < end of copied text >    -STRESS TEST:  < from: NM Nuclear Stress Pharmacologic Multiple (06.16.21 @ 12:58) >  Impression:  1. MODERATE REVERSIBLE DEFECT IN THE SEPTUM, ANTERIOR AND INFERIOR WALL OF THE LEFT VENTRICLE CONSISTENT WITH ISCHEMIA.  2. DILATED LEFT VENTRICLE WITH MARKED GLOBAL HYPOKINESIS. THERE IS INCOMPLETE THICKENING OF THE SEPTUM ANTERIOR AND INFERIOR WALL OF THE LEFT VENTRICLE RAISING QUESTION OF HIBERNATING MYOCARDIUM. THE LATERAL WALL OF THE LEFT VENTRICLE THICKENS NORMALLY.  3. LEFT VENTRICULAR EJECTION FRACTION OF <20 % WHICH IS VERY LOW. ECHOCARDIOGRAPHIC ESTIMATED EJECTION FRACTION 6/14/2021 WAS 35 TO 40%.    < end of copied text >    -CATHETERIZATION:  < from: Cardiac Cath Lab - Adult (06.21.21 @ 11:51) >  CORONARY CIRCULATION: The coronary circulation is right dominant. There was    severe 1-vessel coronary artery disease (RCA). Left main: Normal. LAD:    Angiography showed mild atherosclerosis with no flow limiting lesions. 1st    diagonal: Angiography showed mild atherosclerosis with no flow limiting    lesions. Circumflex: Angiography showed minor luminal irregularities with    no flow limiting lesions. 1st obtuse marginal: Normal. Proximal RCA:    Angiography showed mild atherosclerosisMid RCA: There was a diffuse 90 %    stenosis at a site with no prior intervention. This is a likely culprit    for the patient's clinical presentation. An intervention was performed.    Distal RCA: Angiography showed minor luminal irregularities with no flow    limiting lesions. Right PDA: Normal.    < end of copied text >    ECG:  < from: 12 Lead ECG (11.29.21 @ 15:41) >  Ventricular Rate 48 BPM    Atrial Rate 48 BPM    P-R Interval 142 ms    QRS Duration 90 ms    Q-T Interval 448 ms    QTC Calculation(Bazett) 400 ms    P Axis 22 degrees    R Axis 37 degrees    T Axis -13 degrees    Diagnosis Line Sinus bradycardia  Inferior infarct , age undetermined  Abnormal ECG    < end of copied text >    TELEMETRY EVENTS: n/s

## 2021-11-30 NOTE — CONSULT NOTE ADULT - ASSESSMENT
Acute on chronic systolic HF /Fluid overload /pulmonary HTN    Transfer to Tele  Patient is massively fluid overloaded on exam  Give Bumex 2 mg IV push, then start a Bumex gtt at 1 mg/hr   Start 3% Hypertonic Saline 150ml BID  Get BMP twice daily   Maintain potassium >4.0, Mg >2.1  Strict intake and output  Daily weight   Plan discussed with primary team  Will continue to follow  Acute on chronic systolic HF /Fluid overload /pulmonary HTN    Transfer to Tele  Patient is massively fluid overloaded on exam  Give Bumex 2 mg IV push, then start a Bumex gtt at 1 mg/hr   Start 3% Hypertonic Saline 150ml BID  Get BMP twice daily   Maintain potassium >4.0, Mg >2.1  Strict intake and output  Daily weight   Plan discussed with primary team  Will continue to follow  Acute on chronic systolic HF /Fluid overload /pulmonary HTN    Transfer to Tele  Patient is massively fluid overloaded on exam  Give Bumex 2 mg IV push, then start a Bumex gtt at 1 mg/hr   Start 3% Hypertonic Saline 150ml BID  Continue Entresto 49-51 mg BID   Continue metoprolol XL 50 mg daily   Spironolactone 25 mg daily   Get BMP twice daily   Correct electrolytes - maintain potassium >4.0, Mg >2.1  Strict intake and output  Daily weight   Plan discussed with primary team  Needs better medication reconciliation upon discharge - she says meds were never sent to the pharmacy on previous discharge   Will continue to follow

## 2021-11-30 NOTE — CONSULT NOTE ADULT - SUBJECTIVE AND OBJECTIVE BOX
Date of Admission: 21    HISTORY OF PRESENT ILLNESS:       PAST MEDICAL & SURGICAL HISTORY:      Hypertension  Hyperlipidemia  Anxiety and depression  COPD, severe  CHF (congestive heart failure)  Cerebrovascular accident (CVA)Multiple  Type 2 diabetes mellitus  CKD (chronic kidney disease), stage II  No significant past surgical history        FAMILY HISTORY:  No pertinent family history of premature cardiovascular disease in first degree relatives.  Mother:  of cancer at 65  Father:  of an overdose at 50    SOCIAL HISTORY:      Current every day smoker, 2 cigarettes a day, denies alcohol or drug use    Allergies    No Known Allergies    Intolerances    	    REVIEW OF SYSTEMS:  CONSTITUTIONAL: No fever, weight loss, or fatigue  CARDIOLOGY: denies chest pain, + shortness of breath or syncopal episodes.   RESPIRATORY: + shortness of breath, + wheezing.   NEUROLOGICAL: no weakness, no focal deficits to report.  ENDOCRINOLOGICAL: no recent change in diabetic medications.   GI: no BRBPR, no N,V, diarrhea.    PSYCHIATRY: normal mood and affect  HEENT: no nasal discharge, no ecchymosis  SKIN: no ecchymosis, no breakdown  MUSCULOSKELETAL: Full range of motion x4. WC bound    PHYSICAL EXAM:  General Appearance: well appearing, normal for age and gender. 	  Neck: unable to assess due to body habitus JVP, no bruit.   Eyes: Extra Ocular muscles intact.   Cardiovascular: regular rate and rhythm S1 S2, , No murmurs, Generalized edema  Respiratory: B/L expiratory wheezing  Psychiatry: Alert and oriented x 3, Mood & affect appropriate  Gastrointestinal:  Soft, Non-tender  Skin/Integumentary : No rashes, No ecchymoses, No cyanosis	  Neurologic: Non-focal  Musculoskeletal/ extremities: Normal range of motion, No clubbing, cyanosis   Vascular: Peripheral pulses palpable 2+ bilaterally    CARDIAC MARKERS:    Serum Pro-Brain Natriuretic Peptide: 5736 pg/mL (21 @ 15:10)    Serum Pro-Brain Natriuretic Peptide: 7542 pg/mL (21 @ 11:27)      TELEMETRY EVENTS: 	    ECG:  	    PREVIOUS DIAGNOSTIC TESTING:      TTE 21    Summary:   1. Left ventricular ejection fraction, by visual estimation, is 35 to 40%.   2.Moderately decreased global left ventricular systolic function.   3. Multiple left ventricular regional wall motion abnormalities exist. See wall motion findings.   4. Elevated left atrial and left ventricular end-diastolic pressures.   5. Mild concentric left ventricular hypertrophy.   6. Mildly increased LV wall thickness.   7. Normal left ventricular internal cavity size.   8. Spectral Doppler shows restrictive pattern of left ventricular myocardial filling (Grade III diastolic dysfunction).  9. Moderately reduced RV systolic function.  10. Mildly enlarged left atrium.  11. Moderately enlarged right atrium.  12. Small pericardial effusion.  13. Mild to moderate mitral valve regurgitation.  14. Moderate-severe tricuspid regurgitation.  15.Mild aortic regurgitation.  16. Sclerotic aortic valve with normal opening.  17. Estimated pulmonary artery systolic pressure is 50.0 mmHg assuming a right atrial pressure of 15 mmHg, which is consistent with moderate pulmonary hypertension.  18. Pulmonary hypertension is present.  19. LA volume Index is 36.7 ml/m² ml/m2.    	    Home Medications:  Albuterol (Eqv-ProAir HFA) 90 mcg/inh inhalation aerosol: 2 puff(s) inhaled every 6 hours, As Needed (2021 11:22)  aspirin 81 mg oral tablet: 1 tab(s) orally once a day (2021 11:22)  fenofibrate 145 mg oral tablet: 1 tab(s) orally once a day (2021 11:22)  glipiZIDE 10 mg oral tablet: 1 tab(s) orally 2 times a day (2021 11:22)  Lovaza 1000 mg oral capsule: 2 cap(s) orally 2 times a day (2021 14:52)  metFORMIN 1000 mg oral tablet: 1 tab(s) orally 2 times a day Do not take until  (2021 12:58)  Plavix 75 mg oral tablet: 1 tab(s) orally once a day (2021 11:22)  Vitamin D2 1.25 mg (50,000 intl units) oral capsule: 1 cap(s) orally once a week (2021 14:52)    MEDICATIONS  (STANDING):  aspirin  chewable 81 milliGRAM(s) Oral daily  atorvastatin 80 milliGRAM(s) Oral at bedtime  budesonide 160 MICROgram(s)/formoterol 4.5 MICROgram(s) Inhaler 2 Puff(s) Inhalation two times a day  chlorhexidine 4% Liquid 1 Application(s) Topical <User Schedule>  clopidogrel Tablet 75 milliGRAM(s) Oral daily  enoxaparin Injectable 40 milliGRAM(s) SubCutaneous daily  fenofibrate Tablet 145 milliGRAM(s) Oral daily  furosemide   Injectable 40 milliGRAM(s) IV Push two times a day  magnesium sulfate  IVPB 2 Gram(s) IV Intermittent once  metoprolol succinate ER 50 milliGRAM(s) Oral daily  omega-3-Acid Ethyl Esters 2 Gram(s) Oral two times a day  pantoprazole    Tablet 40 milliGRAM(s) Oral before breakfast  sacubitril 49 mG/valsartan 51 mG 1 Tablet(s) Oral two times a day    MEDICATIONS  (PRN):  ALBUTerol    90 MICROgram(s) HFA Inhaler 2 Puff(s) Inhalation every 6 hours PRN Shortness of Breath and/or Wheezing         Date of Admission: 21    HISTORY OF PRESENT ILLNESS: 56 year old (smoker) female patient known to have:  - Depression. Home med Seroquel 25 QD   - COPD and chronic hypoxic RF. On home O2 4LPM. Home med Albuterol 2 puffs Q6h PRN, Symbicort 2 puffs BID  - Dyslipidemia/ History of CVA with residual Left LE weakness. Uses wheelchair at baseline. Lipid Profile (11.10.21 @ 18:31) Cholesterol, Serum: 89 mg/dL; Triglycerides, Serum: 174 mg/dL; HDL Cholesterol, Serum: 24 mg/dL. Home meds Atorvastatin 80 mg QD, Aspirin 81mg QD, Plavix 75mg QD, Fenofibrate 145mg QD  - HFrEF. TTE  EF 15-20%, DDII, WMA, mod MR, mild-mod TR, severe PHTN, small pericardial effusion. Last evaluated by Dr Buitrago on 11/15 and plan was to perform cardiac cath post diuresis. Recent admission - for HFrEF Exacerbation s/p HF evaluation. Home med Torsemide 10mg QD and supposed to be on aldactone 25mg QD, Toprol 50mg QD, Dapagliflozin 10mg QD, Entresto 49/51mg BID  - HTN. Home meds Toprol 50mg QD, Entresto 49/51mg BID, Torsemide 10mg QD, aldactone 25mg QD  - DM II. Last Hba1c 7.7 11/10. Home meds metformin 1g BID, Glipizide 10mg BID, Dapagliflozin 10mg   She presented to the ED on  following an episode of desaturation at clinic.   History goes back to this afternoon when the patient's oxygen ran off.  This was followed 5 minutes later by an episode of desaturation down to 77%.  Patient had shortness of breath along with light headedness during the episode.  She otherwise denied chest pain, palpitations, diaphoresis, or nausea.  Over the last few weeks PTP, patient has noted worsening shortness of breath at rest and on exertion.  She has also noted LE swelling, weight gain, orthopnea, and PNDs.      Patient well known to heart failure team. Recent hospitalization, but she states she was unable to obtain the medications she was discharged on from her pharmacy, therefore was non compliant with heart failure medications. + SOB, orthopnea, LE edema.    PAST MEDICAL & SURGICAL HISTORY:  Hypertension  Hyperlipidemia  Anxiety and depression  COPD, severe  CHF (congestive heart failure)  Cerebrovascular accident (CVA)Multiple  Type 2 diabetes mellitus  CKD (chronic kidney disease), stage II  No significant past surgical history        FAMILY HISTORY:  No pertinent family history of premature cardiovascular disease in first degree relatives.  Mother:  of cancer at 65  Father:  of an overdose at 50    SOCIAL HISTORY:    Current every day smoker, 2 cigarettes a day, denies alcohol or drug use    Allergies  No Known Allergies    Intolerances    	    REVIEW OF SYSTEMS:  CONSTITUTIONAL: No fever, weight loss, or fatigue  CARDIOLOGY: denies chest pain, + shortness of breath, no syncopal episodes.   RESPIRATORY: + shortness of breath, + wheezing.   NEUROLOGICAL: no weakness, no focal deficits to report.  ENDOCRINOLOGICAL: no recent change in diabetic medications.   GI: no BRBPR, no N,V, diarrhea.    PSYCHIATRY: normal mood and affect  HEENT: no nasal discharge, no ecchymosis  SKIN: no ecchymosis, no breakdown  MUSCULOSKELETAL: Full range of motion x4. WC bound    PHYSICAL EXAM:  General Appearance: well appearing, normal for age and gender. 	  Neck: unable to assess due to body habitus JVP, no bruit.   Eyes: Extra Ocular muscles intact.   Cardiovascular: regular rate and rhythm S1 S2, , No murmurs, Generalized edema  Respiratory: B/L expiratory wheezing  Psychiatry: Alert and oriented x 3, Mood & affect appropriate  Gastrointestinal:  Soft, Non-tender  Skin/Integumentary : No rashes, No ecchymoses, No cyanosis	  Neurologic: Non-focal  Musculoskeletal/ extremities: Normal range of motion, No clubbing, cyanosis   Vascular: Peripheral pulses palpable 2+ bilaterally    CARDIAC MARKERS:  Serum Pro-Brain Natriuretic Peptide: 5736 pg/mL (21 @ 15:10)    Serum Pro-Brain Natriuretic Peptide: 7542 pg/mL (21 @ 11:27)      TELEMETRY EVENTS: 	    EC21      Ventricular Rate 48 BPM  Atrial Rate 48 BPM  P-R Interval 142 ms  QRS Duration 90 ms  Q-T Interval 448 ms  QTC Calculation(Bazett) 400 ms  P Axis 22 degrees  R Axis 37 degrees  T Axis -13 degrees    Diagnosis Line Sinus bradycardia  Inferior infarct , age undetermined  Abnormal ECG    Confirmed by Elenita Combs MD ([id number]) on 2021 6:09:18 PM      PREVIOUS DIAGNOSTIC TESTING:      TTE 21    Summary:   1. Left ventricular ejection fraction, by visual estimation, is 35 to 40%.   2.Moderately decreased global left ventricular systolic function.   3. Multiple left ventricular regional wall motion abnormalities exist. See wall motion findings.   4. Elevated left atrial and left ventricular end-diastolic pressures.   5. Mild concentric left ventricular hypertrophy.   6. Mildly increased LV wall thickness.   7. Normal left ventricular internal cavity size.   8. Spectral Doppler shows restrictive pattern of left ventricular myocardial filling (Grade III diastolic dysfunction).  9. Moderately reduced RV systolic function.  10. Mildly enlarged left atrium.  11. Moderately enlarged right atrium.  12. Small pericardial effusion.  13. Mild to moderate mitral valve regurgitation.  14. Moderate-severe tricuspid regurgitation.  15.Mild aortic regurgitation.  16. Sclerotic aortic valve with normal opening.  17. Estimated pulmonary artery systolic pressure is 50.0 mmHg assuming a right atrial pressure of 15 mmHg, which is consistent with moderate pulmonary hypertension.  18. Pulmonary hypertension is present.  19. LA volume Index is 36.7 ml/m² ml/m2.    	    Home Medications:  Albuterol (Eqv-ProAir HFA) 90 mcg/inh inhalation aerosol: 2 puff(s) inhaled every 6 hours, As Needed (2021 11:22)  aspirin 81 mg oral tablet: 1 tab(s) orally once a day (2021 11:22)  fenofibrate 145 mg oral tablet: 1 tab(s) orally once a day (2021 11:22)  glipiZIDE 10 mg oral tablet: 1 tab(s) orally 2 times a day (2021 11:22)  Lovaza 1000 mg oral capsule: 2 cap(s) orally 2 times a day (2021 14:52)  metFORMIN 1000 mg oral tablet: 1 tab(s) orally 2 times a day Do not take until  (2021 12:58)  Plavix 75 mg oral tablet: 1 tab(s) orally once a day (2021 11:22)  Vitamin D2 1.25 mg (50,000 intl units) oral capsule: 1 cap(s) orally once a week (2021 14:52)    MEDICATIONS  (STANDING):  aspirin  chewable 81 milliGRAM(s) Oral daily  atorvastatin 80 milliGRAM(s) Oral at bedtime  budesonide 160 MICROgram(s)/formoterol 4.5 MICROgram(s) Inhaler 2 Puff(s) Inhalation two times a day  chlorhexidine 4% Liquid 1 Application(s) Topical <User Schedule>  clopidogrel Tablet 75 milliGRAM(s) Oral daily  enoxaparin Injectable 40 milliGRAM(s) SubCutaneous daily  fenofibrate Tablet 145 milliGRAM(s) Oral daily  furosemide   Injectable 40 milliGRAM(s) IV Push two times a day  magnesium sulfate  IVPB 2 Gram(s) IV Intermittent once  metoprolol succinate ER 50 milliGRAM(s) Oral daily  omega-3-Acid Ethyl Esters 2 Gram(s) Oral two times a day  pantoprazole    Tablet 40 milliGRAM(s) Oral before breakfast  sacubitril 49 mG/valsartan 51 mG 1 Tablet(s) Oral two times a day    MEDICATIONS  (PRN):  ALBUTerol    90 MICROgram(s) HFA Inhaler 2 Puff(s) Inhalation every 6 hours PRN Shortness of Breath and/or Wheezing         Date of Admission: 21    HISTORY OF PRESENT ILLNESS: 56 year old (smoker) female patient known to have:  - Depression. Home med Seroquel 25 QD   - COPD and chronic hypoxic RF. On home O2 4LPM. Home med Albuterol 2 puffs Q6h PRN, Symbicort 2 puffs BID  - Dyslipidemia/ History of CVA with residual Left LE weakness. Uses wheelchair at baseline. Lipid Profile (11.10.21 @ 18:31) Cholesterol, Serum: 89 mg/dL; Triglycerides, Serum: 174 mg/dL; HDL Cholesterol, Serum: 24 mg/dL. Home meds Atorvastatin 80 mg QD, Aspirin 81mg QD, Plavix 75mg QD, Fenofibrate 145mg QD  - HFrEF. TTE  EF 15-20%, DDII, WMA, mod MR, mild-mod TR, severe PHTN, small pericardial effusion. Last evaluated by Dr Buitrago on 11/15 and plan was to perform cardiac cath post diuresis. Recent admission - for HFrEF Exacerbation s/p HF evaluation. Home med Torsemide 10mg QD and supposed to be on aldactone 25mg QD, Toprol 50mg QD, Dapagliflozin 10mg QD, Entresto 49/51mg BID  - HTN. Home meds Toprol 50mg QD, Entresto 49/51mg BID, Torsemide 10mg QD, aldactone 25mg QD  - DM II. Last Hba1c 7.7 11/10. Home meds metformin 1g BID, Glipizide 10mg BID, Dapagliflozin 10mg   She presented to the ED on  following an episode of desaturation at clinic.   History goes back to this afternoon when the patient's oxygen ran off.  This was followed 5 minutes later by an episode of desaturation down to 77%.  Patient had shortness of breath along with light headedness during the episode.  She otherwise denied chest pain, palpitations, diaphoresis, or nausea.  Over the last few weeks PTP, patient has noted worsening shortness of breath at rest and on exertion.  She has also noted LE swelling, weight gain, orthopnea, and PNDs.      Patient well known to heart failure team. Recent hospitalization, but she states she was unable to obtain the medications she was discharged on from her pharmacy, therefore was non compliant with heart failure medications. + SOB, orthopnea, LE edema.          PAST MEDICAL & SURGICAL HISTORY:  Hypertension  Hyperlipidemia  Anxiety and depression  COPD, severe  CHF (congestive heart failure)  Cerebrovascular accident (CVA)Multiple  Type 2 diabetes mellitus  CKD (chronic kidney disease), stage II  No significant past surgical history        FAMILY HISTORY:  No pertinent family history of premature cardiovascular disease in first degree relatives.  Mother:  of cancer at 65  Father:  of an overdose at 50    SOCIAL HISTORY:    Current every day smoker, 2 cigarettes a day, denies alcohol or drug use    Allergies  No Known Allergies    Intolerances    	    REVIEW OF SYSTEMS:  CONSTITUTIONAL: No fever, weight loss, or fatigue  CARDIOLOGY: denies chest pain, + shortness of breath, no syncopal episodes.   RESPIRATORY: + shortness of breath, + wheezing.   NEUROLOGICAL: no weakness, no focal deficits to report.  ENDOCRINOLOGICAL: no recent change in diabetic medications.   GI: no BRBPR, no N,V, diarrhea.    PSYCHIATRY: normal mood and affect  HEENT: no nasal discharge, no ecchymosis  SKIN: no ecchymosis, no breakdown  MUSCULOSKELETAL: Full range of motion x4. WC bound    PHYSICAL EXAM:  General Appearance: well appearing, normal for age and gender. 	  Neck: unable to assess due to body habitus JVP, no bruit.   Eyes: Extra Ocular muscles intact.   Cardiovascular: regular rate and rhythm S1 S2, , No murmurs, Generalized edema  Respiratory: B/L expiratory wheezing  Psychiatry: Alert and oriented x 3, Mood & affect appropriate  Gastrointestinal:  Soft, Non-tender  Skin/Integumentary : No rashes, No ecchymoses, No cyanosis	  Neurologic: Non-focal  Musculoskeletal/ extremities: Normal range of motion, No clubbing, cyanosis   Vascular: Peripheral pulses palpable 2+ bilaterally    CARDIAC MARKERS:  Serum Pro-Brain Natriuretic Peptide: 5736 pg/mL (21 @ 15:10)    Serum Pro-Brain Natriuretic Peptide: 7542 pg/mL (21 @ 11:27)      TELEMETRY EVENTS: 	    EC21      Ventricular Rate 48 BPM  Atrial Rate 48 BPM  P-R Interval 142 ms  QRS Duration 90 ms  Q-T Interval 448 ms  QTC Calculation(Bazett) 400 ms  P Axis 22 degrees  R Axis 37 degrees  T Axis -13 degrees    Diagnosis Line Sinus bradycardia  Inferior infarct , age undetermined  Abnormal ECG    Confirmed by Elenita Combs MD ([id number]) on 2021 6:09:18 PM      PREVIOUS DIAGNOSTIC TESTING:      TTE 21    Summary:   1. Left ventricular ejection fraction, by visual estimation, is 35 to 40%.   2.Moderately decreased global left ventricular systolic function.   3. Multiple left ventricular regional wall motion abnormalities exist. See wall motion findings.   4. Elevated left atrial and left ventricular end-diastolic pressures.   5. Mild concentric left ventricular hypertrophy.   6. Mildly increased LV wall thickness.   7. Normal left ventricular internal cavity size.   8. Spectral Doppler shows restrictive pattern of left ventricular myocardial filling (Grade III diastolic dysfunction).  9. Moderately reduced RV systolic function.  10. Mildly enlarged left atrium.  11. Moderately enlarged right atrium.  12. Small pericardial effusion.  13. Mild to moderate mitral valve regurgitation.  14. Moderate-severe tricuspid regurgitation.  15.Mild aortic regurgitation.  16. Sclerotic aortic valve with normal opening.  17. Estimated pulmonary artery systolic pressure is 50.0 mmHg assuming a right atrial pressure of 15 mmHg, which is consistent with moderate pulmonary hypertension.  18. Pulmonary hypertension is present.  19. LA volume Index is 36.7 ml/m² ml/m2.    	    Home Medications:  Albuterol (Eqv-ProAir HFA) 90 mcg/inh inhalation aerosol: 2 puff(s) inhaled every 6 hours, As Needed (2021 11:22)  aspirin 81 mg oral tablet: 1 tab(s) orally once a day (2021 11:22)  fenofibrate 145 mg oral tablet: 1 tab(s) orally once a day (2021 11:22)  glipiZIDE 10 mg oral tablet: 1 tab(s) orally 2 times a day (2021 11:22)  Lovaza 1000 mg oral capsule: 2 cap(s) orally 2 times a day (2021 14:52)  metFORMIN 1000 mg oral tablet: 1 tab(s) orally 2 times a day Do not take until  (2021 12:58)  Plavix 75 mg oral tablet: 1 tab(s) orally once a day (2021 11:22)  Vitamin D2 1.25 mg (50,000 intl units) oral capsule: 1 cap(s) orally once a week (2021 14:52)    MEDICATIONS  (STANDING):  aspirin  chewable 81 milliGRAM(s) Oral daily  atorvastatin 80 milliGRAM(s) Oral at bedtime  budesonide 160 MICROgram(s)/formoterol 4.5 MICROgram(s) Inhaler 2 Puff(s) Inhalation two times a day  chlorhexidine 4% Liquid 1 Application(s) Topical <User Schedule>  clopidogrel Tablet 75 milliGRAM(s) Oral daily  enoxaparin Injectable 40 milliGRAM(s) SubCutaneous daily  fenofibrate Tablet 145 milliGRAM(s) Oral daily  furosemide   Injectable 40 milliGRAM(s) IV Push two times a day  magnesium sulfate  IVPB 2 Gram(s) IV Intermittent once  metoprolol succinate ER 50 milliGRAM(s) Oral daily  omega-3-Acid Ethyl Esters 2 Gram(s) Oral two times a day  pantoprazole    Tablet 40 milliGRAM(s) Oral before breakfast  sacubitril 49 mG/valsartan 51 mG 1 Tablet(s) Oral two times a day    MEDICATIONS  (PRN):  ALBUTerol    90 MICROgram(s) HFA Inhaler 2 Puff(s) Inhalation every 6 hours PRN Shortness of Breath and/or Wheezing

## 2021-11-30 NOTE — CHART NOTE - NSCHARTNOTEFT_GEN_A_CORE
Transfer Note    Transfer from: 3A  Transfer to: 3C tele      HOSPITAL COURSE:  56 year old female with a pmhx of chronic hypoxic respiratory failure (on 4L home O2) secondary to ( HFrEF 35-40%, GIII DD)  with pulmonary hypertension, HTN, HLD, DM2, active smoker , CVA with residual LE weakness (2014, wheelchair bound),  COPD , mood /depression,  presents of worsening shortness of breathShe presented to the ED on 11/29 following an episode of desaturation at clinic.   History goes back to this afternoon when the patient's oxygen ran off.  This was followed 5 minutes later by an episode of desaturation down to 77%.  Patient had shortness of breath along with light headedness during the episode.  She otherwise denied chest pain, palpitations, diaphoresis, or nausea.  Over the last few weeks PTP, patient has noted worsening shortness of breath at rest and on exertion.  She has also noted LE swelling, weight gain, orthopnea, and PNDs.    ASSESSMENT & PLAN:       Case of a 56 year old female patient with multiple comorbidities including COPD on home O2, HFrEF, HTN, DL/CVA with residual left LE weakness, and DMII who presented on 11/29 for evaluation of an episode of desaturation after her O2 ran off at her PCP's clinic, found to have elevated BNP on labs and evidence of congestion on imaging, to be admitted for management of acute on chronic HFrEF decompensation. Currently hemodynamically stable.      Acute on Chronic HFrEF Decompensation  * TTE 11/14 EF 15-20%, DDII, WMA, mod MR, mild-mod TR, severe PHTN, small pericardial effusion.  * Last evaluated by Dr Buitrago on 11/15 and plan was to perform cardiac cath post diuresis.   * Recent admission 11/08-11/17 for HFrEF Exacerbation s/p HF evaluation.   * Home med Torsemide 10mg QD and supposed to be on aldactone 25mg QD, Toprol 50mg QD, Dapagliflozin 10mg QD, Entresto 49/51mg BID  * ED Vitals SaO2 99% on 4LPM, /99mmHg  * ED Labs Pro BNP 5736 (was 7542 11/08)  * CXR with evidence of congestion  - Monitor SAO2 and O2 requirements: currently home requirements  - Continue IV Lasix 40mg BID. S/P IV lasix 40mg x1 dose in ED. Was on Bumex drip then torsemide and metolazone during last admission 11/08-11/17   - Fluid restrictions to 1.5L/24 hours  - Resume Entresto 49/51mg BID  - Hold beta blocker in setting of sinus bradycardia on ECG. Consider telemetry monitoring  - Resume Atorvastatin 80 mg QD and Fenofibrate 145mg QD  - Resume Aspirin 81mg QD and Plavix 75mg QD per Dr Buitrago's recommendation (note from 11/15)  - cardio recommend : lasix 40mg IV BID. can switch back to torsemide 20mg when euvolemic and compensated  - restarted home spiranolactone , keep K>4 and Mg>2  - f/u HF eval   - Monitor in/out  - Monitor daily weight  - Monitor daily CXR      Depression  * Home med Seroquel 25 QD   - Resume Seroquel 25 QD       COPD and chronic hypoxic RF  * On home O2 4LPM  * Home med Albuterol 2 puffs Q6h PRN, Symbicort 2 puffs BID  - Monitor SAO2 and O2 requirements: currently home requirements  - Duonebs PRN  - Resume Symbicort 2 puffs BID      Dyslipidemia  History of CVA with residual Left LE weakness  * Uses wheelchair at baseline  * Lipid Profile (11.10.21 @ 18:31) Cholesterol, Serum: 89 mg/dL; Triglycerides, Serum: 174 mg/dL; HDL Cholesterol, Serum: 24 mg/dL.  * Home meds Atorvastatin 80 mg QD, Aspirin 81mg QD, Plavix 75mg QD, Fenofibrate 145mg QD    - Resume Atorvastatin 80 mg QD and Fenofibrate 145mg QD  - Resume Aspirin 81mg QD and Plavix 75mg QD per Dr Buitrago's recommendation (note from 11/15)      HTN  * Home meds Toprol 50mg QD, Entresto 49/51mg BID, Torsemide 10mg QD, aldactone 25mg QD  * ED /99mmHg  - Monitor BP closely  - Resume Entresto 49/51mg BID  - IV diuretics as above  - Hold beta blocker in setting of sinus bradycardia on ECG. Consider telemetry monitoring      DM II  * Last Hba1c 7.7 11/10  * Home meds metformin 1g BID, Glipizide 10mg BID, Dapagliflozin 10mg   - Monitor POCT premeals and at bedtime  - Start Apidra sliding Scale B for now      Others  - DVT Prophylaxis: Lovenox 40mg Subcutaneously daily  - GI Prophylaxis: Pantoprazole 40mg PO QD  - Diet: DASH/ TLC  - Code Status: Full  -Handoff f/u     For Follow-Up: HF team ,duplex ,TSH Transfer Note    Transfer from: 3A  Transfer to: 3C tele      HOSPITAL COURSE:  56 year old female with a pmhx of chronic hypoxic respiratory failure (on 4L home O2) secondary to ( HFrEF 35-40%, GIII DD)  with pulmonary hypertension, HTN, HLD, DM2, active smoker , CVA with residual LE weakness (2014, wheelchair bound),  COPD , mood /depression,  presents of worsening shortness of breathShe presented to the ED on 11/29 following an episode of desaturation at clinic.   History goes back to this afternoon when the patient's oxygen ran off.  This was followed 5 minutes later by an episode of desaturation down to 77%.  Patient had shortness of breath along with light headedness during the episode.  She otherwise denied chest pain, palpitations, diaphoresis, or nausea.  Over the last few weeks PTP, patient has noted worsening shortness of breath at rest and on exertion.  She has also noted LE swelling, weight gain, orthopnea, and PNDs.    ASSESSMENT & PLAN:       Case of a 56 year old female patient with multiple comorbidities including COPD on home O2, HFrEF, HTN, DL/CVA with residual left LE weakness, and DMII who presented on 11/29 for evaluation of an episode of desaturation after her O2 ran off at her PCP's clinic, found to have elevated BNP on labs and evidence of congestion on imaging, to be admitted for management of acute on chronic HFrEF decompensation. Currently hemodynamically stable.      Acute on Chronic HFrEF Decompensation  * TTE 11/14 EF 15-20%, DDII, WMA, mod MR, mild-mod TR, severe PHTN, small pericardial effusion.  * Last evaluated by Dr Buitrago on 11/15 and plan was to perform cardiac cath post diuresis.   * Recent admission 11/08-11/17 for HFrEF Exacerbation s/p HF evaluation.   * Home med Torsemide 10mg QD and supposed to be on aldactone 25mg QD, Toprol 50mg QD, Dapagliflozin 10mg QD, Entresto 49/51mg BID  * ED Vitals SaO2 99% on 4LPM, /99mmHg  * ED Labs Pro BNP 5736 (was 7542 11/08)  * CXR with evidence of congestion  - Monitor SAO2 and O2 requirements: currently home requirements  - Continue IV Lasix 40mg BID. S/P IV lasix 40mg x1 dose in ED. Was on Bumex drip then torsemide and metolazone during last admission 11/08-11/17   - Fluid restrictions to 1.5L/24 hours  - Resume Entresto 49/51mg BID  - Hold beta blocker in setting of sinus bradycardia on ECG. Consider telemetry monitoring  - Resume Atorvastatin 80 mg QD and Fenofibrate 145mg QD  - Resume Aspirin 81mg QD and Plavix 75mg QD per Dr Buitrago's recommendation (note from 11/15)  - cardio recommend : lasix 40mg IV BID. can switch back to torsemide 20mg when euvolemic and compensated  - restarted home spiranolactone , keep K>4 and Mg>2  - HF eval : Give Bumex 2 mg IV push, then start a Bumex gtt at 1 mg/hr   Start 3% Hypertonic Saline 150ml BID  Get BMP twice daily   - Monitor in/out  - Monitor daily weight      Depression  * Home med Seroquel 25 QD   - Resume Seroquel 25 QD       COPD and chronic hypoxic RF  * On home O2 4LPM  * Home med Albuterol 2 puffs Q6h PRN, Symbicort 2 puffs BID  - Monitor SAO2 and O2 requirements: currently home requirements  - Duonebs PRN  - Resume Symbicort 2 puffs BID      Dyslipidemia  History of CVA with residual Left LE weakness  * Uses wheelchair at baseline  * Lipid Profile (11.10.21 @ 18:31) Cholesterol, Serum: 89 mg/dL; Triglycerides, Serum: 174 mg/dL; HDL Cholesterol, Serum: 24 mg/dL.  * Home meds Atorvastatin 80 mg QD, Aspirin 81mg QD, Plavix 75mg QD, Fenofibrate 145mg QD    - Resume Atorvastatin 80 mg QD and Fenofibrate 145mg QD  - Resume Aspirin 81mg QD and Plavix 75mg QD per Dr Buitrago's recommendation (note from 11/15)      HTN  * Home meds Toprol 50mg QD, Entresto 49/51mg BID, Torsemide 10mg QD, aldactone 25mg QD  * ED /99mmHg  - Monitor BP closely  - Resume Entresto 49/51mg BID  - IV diuretics as above  - Hold beta blocker in setting of sinus bradycardia on ECG. Consider telemetry monitoring      DM II  * Last Hba1c 7.7 11/10  * Home meds metformin 1g BID, Glipizide 10mg BID, Dapagliflozin 10mg   - Monitor POCT premeals and at bedtime  - Start Apidra sliding Scale B for now      Others  - DVT Prophylaxis: Lovenox 40mg Subcutaneously daily  - GI Prophylaxis: Pantoprazole 40mg PO QD  - Diet: DASH/ TLC  - Code Status: Full  -Handoff f/u     For Follow-Up: HF team ,duplex ,TSH ,BMP q12

## 2021-11-30 NOTE — PROGRESS NOTE ADULT - SUBJECTIVE AND OBJECTIVE BOX
WILLIAN MARY 56y Female  MRN#: [id number]   CODE STATUS:________    Hospital Day: 1d    Pt is currently admitted with the primary diagnosis of CHR exacerbation.    SUBJECTIVE  Hospital Course    56 year old female with a pmhx of chronic hypoxic respiratory failure (on 4L home O2) secondary to ( HFrEF 35-40%, GIII DD)  with pulmonary hypertension, HTN, HLD, DM2, active smoker , CVA with residual LE weakness (2014, wheelchair bound),  COPD , mood /depression,  presents of worsening shortness of breathShe presented to the ED on 11/29 following an episode of desaturation at clinic.   History goes back to this afternoon when the patient's oxygen ran off.  This was followed 5 minutes later by an episode of desaturation down to 77%.  Patient had shortness of breath along with light headedness during the episode.  She otherwise denied chest pain, palpitations, diaphoresis, or nausea.  Over the last few weeks PTP, patient has noted worsening shortness of breath at rest and on exertion.  She has also noted LE swelling, weight gain, orthopnea, and PNDs.    The patient was seen and examined ,comfortable in bed ,on 5L NC.  No new or overnight events.                                            ----------------------------------------------------------  OBJECTIVE  PAST MEDICAL & SURGICAL HISTORY  Hypertension    Hyperlipidemia    Anxiety and depression    COPD, severe    CHF (congestive heart failure)    Cerebrovascular accident (CVA)  Multiple    Type 2 diabetes mellitus    CKD (chronic kidney disease), stage II    No significant past surgical history                                              -----------------------------------------------------------  ALLERGIES:  No Known Allergies                                            ------------------------------------------------------------    HOME MEDICATIONS  Home Medications:  Albuterol (Eqv-ProAir HFA) 90 mcg/inh inhalation aerosol: 2 puff(s) inhaled every 6 hours, As Needed (07 Jun 2021 11:22)  aspirin 81 mg oral tablet: 1 tab(s) orally once a day (07 Jun 2021 11:22)  fenofibrate 145 mg oral tablet: 1 tab(s) orally once a day (07 Jun 2021 11:22)  glipiZIDE 10 mg oral tablet: 1 tab(s) orally 2 times a day (07 Jun 2021 11:22)  Lovaza 1000 mg oral capsule: 2 cap(s) orally 2 times a day (08 Nov 2021 14:52)  magnesium oxide 400 mg oral tablet: 1 tab(s) orally 3 times a day (with meals) (16 Nov 2021 12:10)  metFORMIN 1000 mg oral tablet: 1 tab(s) orally 2 times a day Do not take until 6/23 (22 Jun 2021 12:58)  pantoprazole 40 mg oral delayed release tablet: 1 tab(s) orally once a day (before a meal) (16 Nov 2021 12:10)  Plavix 75 mg oral tablet: 1 tab(s) orally once a day (07 Jun 2021 11:22)  QUEtiapine 25 mg oral tablet: 1 tab(s) orally once a day (at bedtime) (16 Nov 2021 12:10)  Vitamin D2 1.25 mg (50,000 intl units) oral capsule: 1 cap(s) orally once a week (08 Nov 2021 14:52)                           MEDICATIONS:  STANDING MEDICATIONS  aspirin enteric coated 81 milliGRAM(s) Oral daily  atorvastatin 80 milliGRAM(s) Oral at bedtime  budesonide 160 MICROgram(s)/formoterol 4.5 MICROgram(s) Inhaler 2 Puff(s) Inhalation two times a day  chlorhexidine 4% Liquid 1 Application(s) Topical <User Schedule>  clopidogrel Tablet 75 milliGRAM(s) Oral daily  dextrose 40% Gel 15 Gram(s) Oral once  dextrose 5%. 1000 milliLiter(s) IV Continuous <Continuous>  dextrose 5%. 1000 milliLiter(s) IV Continuous <Continuous>  dextrose 50% Injectable 25 Gram(s) IV Push once  dextrose 50% Injectable 12.5 Gram(s) IV Push once  dextrose 50% Injectable 25 Gram(s) IV Push once  enoxaparin Injectable 40 milliGRAM(s) SubCutaneous daily  fenofibrate Tablet 145 milliGRAM(s) Oral daily  furosemide   Injectable 40 milliGRAM(s) IV Push every 12 hours  glucagon  Injectable 1 milliGRAM(s) IntraMuscular once  influenza   Vaccine 0.5 milliLiter(s) IntraMuscular once  insulin lispro (ADMELOG) corrective regimen sliding scale   SubCutaneous three times a day before meals  metoprolol succinate ER 50 milliGRAM(s) Oral daily  pantoprazole    Tablet 40 milliGRAM(s) Oral before breakfast  QUEtiapine 25 milliGRAM(s) Oral at bedtime  sacubitril 49 mG/valsartan 51 mG 1 Tablet(s) Oral two times a day    PRN MEDICATIONS  ALBUTerol    90 MICROgram(s) HFA Inhaler 2 Puff(s) Inhalation every 6 hours PRN                                            ------------------------------------------------------------  VITAL SIGNS: Last 24 Hours  T(C): 35.8 (30 Nov 2021 05:30), Max: 36.1 (29 Nov 2021 14:09)  T(F): 96.5 (30 Nov 2021 05:30), Max: 97 (29 Nov 2021 14:09)  HR: 82 (30 Nov 2021 08:41) (81 - 87)  BP: 133/81 (30 Nov 2021 08:41) (133/81 - 163/99)  BP(mean): --  RR: 18 (30 Nov 2021 08:41) (18 - 22)  SpO2: 96% (30 Nov 2021 08:41) (95% - 99%)                                             --------------------------------------------------------------  LABS:                        12.6   4.81  )-----------( 265      ( 30 Nov 2021 06:54 )             39.3     11-30    135  |  99  |  19  ----------------------------<  183<H>  4.0   |  18  |  1.0    Ca    9.0      30 Nov 2021 06:54  Phos  3.7     11-30  Mg     1.8     11-30    TPro  6.9  /  Alb  3.7  /  TBili  1.5<H>  /  DBili  x   /  AST  21  /  ALT  16  /  AlkPhos  115  11-30          Lactate, Blood: 1.5 mmol/L (11-29-21 @ 15:10)  Troponin T, Serum: 0.05 ng/mL *HH* (11-29-21 @ 15:10)          CARDIAC MARKERS ( 29 Nov 2021 15:10 )  x     / 0.05 ng/mL / x     / x     / x                                                  -------------------------------------------------------------  RADIOLOGY:  < from: Xray Chest 1 View- PORTABLE-Routine (Xray Chest 1 View- PORTABLE-Routine in AM.) (11.17.21 @ 05:59) >  IMPRESSION:    Unchanged bilateral opacities. No pneumothorax.    Stable cardiomediastinal silhouette. Unchanged osseous structures.    < end of copied text >  < from: [facility] Duplex Upper Ext Vein Scan, Right (11.16.21 @ 14:29) >  Impression:    No evidence of deep or superficial thrombosis in the right upper extremity.    < end of copied text >                                            --------------------------------------------------------------    PHYSICAL EXAM:  GENERAL: NAD, lying in bed comfortably ,on 5L NC  HEAD:  Atraumatic, Normocephalic  EYES: EOMI, PERRLA, conjunctiva and sclera clear  ENT: Moist mucous membranes  CHEST/LUNG: Clear to auscultation bilaterally  HEART: Regular rate and rhythm; No murmurs  ABDOMEN:  Soft, Nontender, Nondistended.   EXTREMITIES:  No clubbing, cyanosis .bilateral lower ext edema +2  NERVOUS SYSTEM:  Alert & Oriented X3, speech clear. No deficits   SKIN: No rashes or lesions                                           --------------------------------------------------------------    ASSESSMENT & PLAN    Past medical history and hospital course     Case of a 56 year old female patient with multiple comorbidities including COPD on home O2, HFrEF, HTN, DL/CVA with residual left LE weakness, and DMII who presented on 11/29 for evaluation of an episode of desaturation after her O2 ran off at her PCP's clinic, found to have elevated BNP on labs and evidence of congestion on imaging, to be admitted for management of acute on chronic HFrEF decompensation. Currently hemodynamically stable.      Acute on Chronic HFrEF Decompensation  * TTE 11/14 EF 15-20%, DDII, WMA, mod MR, mild-mod TR, severe PHTN, small pericardial effusion.  * Last evaluated by Dr Buitrago on 11/15 and plan was to perform cardiac cath post diuresis.   * Recent admission 11/08-11/17 for HFrEF Exacerbation s/p HF evaluation.   * Home med Torsemide 10mg QD and supposed to be on aldactone 25mg QD, Toprol 50mg QD, Dapagliflozin 10mg QD, Entresto 49/51mg BID  * ED Vitals SaO2 99% on 4LPM, /99mmHg  * ED Labs Pro BNP 5736 (was 7542 11/08)  * CXR with evidence of congestion  - Monitor SAO2 and O2 requirements: currently home requirements  - Continue IV Lasix 40mg BID. S/P IV lasix 40mg x1 dose in ED. Was on Bumex drip then torsemide and metolazone during last admission 11/08-11/17   - Fluid restrictions to 1.5L/24 hours  - Resume Entresto 49/51mg BID  - Hold beta blocker in setting of sinus bradycardia on ECG. Consider telemetry monitoring  - Resume Atorvastatin 80 mg QD and Fenofibrate 145mg QD  - Resume Aspirin 81mg QD and Plavix 75mg QD per Dr Buitrago's recommendation (note from 11/15)  - cardio recommend : lasix 40mg IV BID. can switch back to torsemide 20mg when euvolemic and compensated  - keep K>4 and Mg>2  - f/u HF eval (supposed to be on spironolactone 25mg and farxiga?) - please start if renal function allows  - Monitor in/out  - Monitor daily weight  - Monitor daily CXR      Depression  * Home med Seroquel 25 QD   - Resume Seroquel 25 QD       COPD and chronic hypoxic RF  * On home O2 4LPM  * Home med Albuterol 2 puffs Q6h PRN, Symbicort 2 puffs BID  - Monitor SAO2 and O2 requirements: currently home requirements  - Duonebs PRN  - Resume Symbicort 2 puffs BID      Dyslipidemia  History of CVA with residual Left LE weakness  * Uses wheelchair at baseline  * Lipid Profile (11.10.21 @ 18:31) Cholesterol, Serum: 89 mg/dL; Triglycerides, Serum: 174 mg/dL; HDL Cholesterol, Serum: 24 mg/dL.  * Home meds Atorvastatin 80 mg QD, Aspirin 81mg QD, Plavix 75mg QD, Fenofibrate 145mg QD    - Resume Atorvastatin 80 mg QD and Fenofibrate 145mg QD  - Resume Aspirin 81mg QD and Plavix 75mg QD per Dr Buitrago's recommendation (note from 11/15)      HTN  * Home meds Toprol 50mg QD, Entresto 49/51mg BID, Torsemide 10mg QD, aldactone 25mg QD  * ED /99mmHg  - Monitor BP closely  - Resume Entresto 49/51mg BID  - IV diuretics as above  - Hold beta blocker in setting of sinus bradycardia on ECG. Consider telemetry monitoring      DM II  * Last Hba1c 7.7 11/10  * Home meds metformin 1g BID, Glipizide 10mg BID, Dapagliflozin 10mg   - Monitor POCT premeals and at bedtime  - Start Apidra sliding Scale B for now      Others  - DVT Prophylaxis: Lovenox 40mg Subcutaneously daily  - GI Prophylaxis: Pantoprazole 40mg PO QD  - Diet: DASH/ TLC  - Code Status: Full  -Handoff f/u HF team

## 2021-11-30 NOTE — CONSULT NOTE ADULT - ASSESSMENT
Acute on chronic decompensated HFrEF  CAD s/p PCI  HTN  DLD  DM  COPD    -c/w ASA  -c/w entresto 49/51  -c/w Toprol 50mg qd  -lasix 40mg IV BID. can switch back to torsemide 20mg when euvolemic and compensated  -keep K>4 and Mg>2  -f/u HF eval (supposed to be on spironolactone 25mg and farxiga?)  -I<O  -daily weight   -1.5L fluid restriction Acute on chronic decompensated HFrEF  CAD s/p PCI  HTN  DLD  DM  COPD    -c/w ASA  -c/w entresto 49/51  -c/w Toprol 50mg qd  -lasix 40mg IV BID. can switch back to torsemide 20mg when euvolemic and compensated  -keep K>4 and Mg>2  -f/u HF eval (supposed to be on spironolactone 25mg and farxiga?) - please start if renal function allows  -I<O  -daily weight   -1.5L fluid restriction  -f/u with Dr. Ge

## 2021-12-01 LAB
ALBUMIN SERPL ELPH-MCNC: 3.7 G/DL — SIGNIFICANT CHANGE UP (ref 3.5–5.2)
ALBUMIN SERPL ELPH-MCNC: 3.9 G/DL — SIGNIFICANT CHANGE UP (ref 3.5–5.2)
ALP SERPL-CCNC: 105 U/L — SIGNIFICANT CHANGE UP (ref 30–115)
ALP SERPL-CCNC: 110 U/L — SIGNIFICANT CHANGE UP (ref 30–115)
ALT FLD-CCNC: 13 U/L — SIGNIFICANT CHANGE UP (ref 0–41)
ALT FLD-CCNC: 14 U/L — SIGNIFICANT CHANGE UP (ref 0–41)
ANION GAP SERPL CALC-SCNC: 19 MMOL/L — HIGH (ref 7–14)
ANION GAP SERPL CALC-SCNC: 20 MMOL/L — HIGH (ref 7–14)
ANION GAP SERPL CALC-SCNC: 20 MMOL/L — HIGH (ref 7–14)
AST SERPL-CCNC: 17 U/L — SIGNIFICANT CHANGE UP (ref 0–41)
AST SERPL-CCNC: 17 U/L — SIGNIFICANT CHANGE UP (ref 0–41)
BASOPHILS # BLD AUTO: 0.05 K/UL — SIGNIFICANT CHANGE UP (ref 0–0.2)
BASOPHILS NFR BLD AUTO: 1.1 % — HIGH (ref 0–1)
BILIRUB SERPL-MCNC: 1.3 MG/DL — HIGH (ref 0.2–1.2)
BILIRUB SERPL-MCNC: 1.3 MG/DL — HIGH (ref 0.2–1.2)
BUN SERPL-MCNC: 16 MG/DL — SIGNIFICANT CHANGE UP (ref 10–20)
BUN SERPL-MCNC: 17 MG/DL — SIGNIFICANT CHANGE UP (ref 10–20)
BUN SERPL-MCNC: 17 MG/DL — SIGNIFICANT CHANGE UP (ref 10–20)
CALCIUM SERPL-MCNC: 8.9 MG/DL — SIGNIFICANT CHANGE UP (ref 8.5–10.1)
CALCIUM SERPL-MCNC: 8.9 MG/DL — SIGNIFICANT CHANGE UP (ref 8.5–10.1)
CALCIUM SERPL-MCNC: 9 MG/DL — SIGNIFICANT CHANGE UP (ref 8.5–10.1)
CHLORIDE SERPL-SCNC: 93 MMOL/L — LOW (ref 98–110)
CHLORIDE SERPL-SCNC: 93 MMOL/L — LOW (ref 98–110)
CHLORIDE SERPL-SCNC: 98 MMOL/L — SIGNIFICANT CHANGE UP (ref 98–110)
CO2 SERPL-SCNC: 24 MMOL/L — SIGNIFICANT CHANGE UP (ref 17–32)
CO2 SERPL-SCNC: 26 MMOL/L — SIGNIFICANT CHANGE UP (ref 17–32)
CO2 SERPL-SCNC: 26 MMOL/L — SIGNIFICANT CHANGE UP (ref 17–32)
CREAT SERPL-MCNC: 0.8 MG/DL — SIGNIFICANT CHANGE UP (ref 0.7–1.5)
CREAT SERPL-MCNC: 0.8 MG/DL — SIGNIFICANT CHANGE UP (ref 0.7–1.5)
CREAT SERPL-MCNC: 0.9 MG/DL — SIGNIFICANT CHANGE UP (ref 0.7–1.5)
EOSINOPHIL # BLD AUTO: 0.09 K/UL — SIGNIFICANT CHANGE UP (ref 0–0.7)
EOSINOPHIL NFR BLD AUTO: 1.9 % — SIGNIFICANT CHANGE UP (ref 0–8)
GLUCOSE BLDC GLUCOMTR-MCNC: 152 MG/DL — HIGH (ref 70–99)
GLUCOSE BLDC GLUCOMTR-MCNC: 191 MG/DL — HIGH (ref 70–99)
GLUCOSE SERPL-MCNC: 102 MG/DL — HIGH (ref 70–99)
GLUCOSE SERPL-MCNC: 119 MG/DL — HIGH (ref 70–99)
GLUCOSE SERPL-MCNC: 132 MG/DL — HIGH (ref 70–99)
HCT VFR BLD CALC: 40.8 % — SIGNIFICANT CHANGE UP (ref 37–47)
HGB BLD-MCNC: 13 G/DL — SIGNIFICANT CHANGE UP (ref 12–16)
IMM GRANULOCYTES NFR BLD AUTO: 0.6 % — HIGH (ref 0.1–0.3)
LYMPHOCYTES # BLD AUTO: 1.19 K/UL — LOW (ref 1.2–3.4)
LYMPHOCYTES # BLD AUTO: 25.4 % — SIGNIFICANT CHANGE UP (ref 20.5–51.1)
MAGNESIUM SERPL-MCNC: 1.2 MG/DL — LOW (ref 1.8–2.4)
MAGNESIUM SERPL-MCNC: 1.7 MG/DL — LOW (ref 1.8–2.4)
MCHC RBC-ENTMCNC: 28.1 PG — SIGNIFICANT CHANGE UP (ref 27–31)
MCHC RBC-ENTMCNC: 31.9 G/DL — LOW (ref 32–37)
MCV RBC AUTO: 88.1 FL — SIGNIFICANT CHANGE UP (ref 81–99)
MONOCYTES # BLD AUTO: 0.27 K/UL — SIGNIFICANT CHANGE UP (ref 0.1–0.6)
MONOCYTES NFR BLD AUTO: 5.8 % — SIGNIFICANT CHANGE UP (ref 1.7–9.3)
NEUTROPHILS # BLD AUTO: 3.05 K/UL — SIGNIFICANT CHANGE UP (ref 1.4–6.5)
NEUTROPHILS NFR BLD AUTO: 65.2 % — SIGNIFICANT CHANGE UP (ref 42.2–75.2)
NRBC # BLD: 0 /100 WBCS — SIGNIFICANT CHANGE UP (ref 0–0)
PLATELET # BLD AUTO: 273 K/UL — SIGNIFICANT CHANGE UP (ref 130–400)
POTASSIUM SERPL-MCNC: 3.5 MMOL/L — SIGNIFICANT CHANGE UP (ref 3.5–5)
POTASSIUM SERPL-MCNC: 3.5 MMOL/L — SIGNIFICANT CHANGE UP (ref 3.5–5)
POTASSIUM SERPL-MCNC: 3.7 MMOL/L — SIGNIFICANT CHANGE UP (ref 3.5–5)
POTASSIUM SERPL-SCNC: 3.5 MMOL/L — SIGNIFICANT CHANGE UP (ref 3.5–5)
POTASSIUM SERPL-SCNC: 3.5 MMOL/L — SIGNIFICANT CHANGE UP (ref 3.5–5)
POTASSIUM SERPL-SCNC: 3.7 MMOL/L — SIGNIFICANT CHANGE UP (ref 3.5–5)
PROT SERPL-MCNC: 7 G/DL — SIGNIFICANT CHANGE UP (ref 6–8)
PROT SERPL-MCNC: 7.3 G/DL — SIGNIFICANT CHANGE UP (ref 6–8)
RBC # BLD: 4.63 M/UL — SIGNIFICANT CHANGE UP (ref 4.2–5.4)
RBC # FLD: 17 % — HIGH (ref 11.5–14.5)
SODIUM SERPL-SCNC: 138 MMOL/L — SIGNIFICANT CHANGE UP (ref 135–146)
SODIUM SERPL-SCNC: 139 MMOL/L — SIGNIFICANT CHANGE UP (ref 135–146)
SODIUM SERPL-SCNC: 142 MMOL/L — SIGNIFICANT CHANGE UP (ref 135–146)
WBC # BLD: 4.68 K/UL — LOW (ref 4.8–10.8)
WBC # FLD AUTO: 4.68 K/UL — LOW (ref 4.8–10.8)

## 2021-12-01 PROCEDURE — 99233 SBSQ HOSP IP/OBS HIGH 50: CPT

## 2021-12-01 RX ORDER — SODIUM CHLORIDE 5 G/100ML
150 INJECTION, SOLUTION INTRAVENOUS
Refills: 0 | Status: DISCONTINUED | OUTPATIENT
Start: 2021-12-01 | End: 2021-12-04

## 2021-12-01 RX ORDER — MAGNESIUM SULFATE 500 MG/ML
2 VIAL (ML) INJECTION
Refills: 0 | Status: COMPLETED | OUTPATIENT
Start: 2021-12-01 | End: 2021-12-01

## 2021-12-01 RX ORDER — POTASSIUM CHLORIDE 20 MEQ
20 PACKET (EA) ORAL ONCE
Refills: 0 | Status: COMPLETED | OUTPATIENT
Start: 2021-12-01 | End: 2021-12-01

## 2021-12-01 RX ADMIN — SACUBITRIL AND VALSARTAN 1 TABLET(S): 24; 26 TABLET, FILM COATED ORAL at 17:25

## 2021-12-01 RX ADMIN — Medication 100 GRAM(S): at 11:21

## 2021-12-01 RX ADMIN — SPIRONOLACTONE 25 MILLIGRAM(S): 25 TABLET, FILM COATED ORAL at 05:16

## 2021-12-01 RX ADMIN — CHLORHEXIDINE GLUCONATE 1 APPLICATION(S): 213 SOLUTION TOPICAL at 05:16

## 2021-12-01 RX ADMIN — CLOPIDOGREL BISULFATE 75 MILLIGRAM(S): 75 TABLET, FILM COATED ORAL at 11:18

## 2021-12-01 RX ADMIN — SODIUM CHLORIDE 50 MILLILITER(S): 5 INJECTION, SOLUTION INTRAVENOUS at 06:09

## 2021-12-01 RX ADMIN — BUDESONIDE AND FORMOTEROL FUMARATE DIHYDRATE 2 PUFF(S): 160; 4.5 AEROSOL RESPIRATORY (INHALATION) at 20:23

## 2021-12-01 RX ADMIN — Medication 81 MILLIGRAM(S): at 11:18

## 2021-12-01 RX ADMIN — Medication 145 MILLIGRAM(S): at 11:18

## 2021-12-01 RX ADMIN — BUMETANIDE 5 MG/HR: 0.25 INJECTION INTRAMUSCULAR; INTRAVENOUS at 06:00

## 2021-12-01 RX ADMIN — ATORVASTATIN CALCIUM 80 MILLIGRAM(S): 80 TABLET, FILM COATED ORAL at 21:27

## 2021-12-01 RX ADMIN — SODIUM CHLORIDE 50 MILLILITER(S): 5 INJECTION, SOLUTION INTRAVENOUS at 18:08

## 2021-12-01 RX ADMIN — Medication 20 MILLIEQUIVALENT(S): at 17:24

## 2021-12-01 RX ADMIN — PANTOPRAZOLE SODIUM 40 MILLIGRAM(S): 20 TABLET, DELAYED RELEASE ORAL at 05:16

## 2021-12-01 RX ADMIN — ENOXAPARIN SODIUM 40 MILLIGRAM(S): 100 INJECTION SUBCUTANEOUS at 11:19

## 2021-12-01 RX ADMIN — Medication 50 MILLIGRAM(S): at 05:16

## 2021-12-01 RX ADMIN — SACUBITRIL AND VALSARTAN 1 TABLET(S): 24; 26 TABLET, FILM COATED ORAL at 05:16

## 2021-12-01 RX ADMIN — Medication 2: at 11:48

## 2021-12-01 RX ADMIN — BUDESONIDE AND FORMOTEROL FUMARATE DIHYDRATE 2 PUFF(S): 160; 4.5 AEROSOL RESPIRATORY (INHALATION) at 08:36

## 2021-12-01 RX ADMIN — Medication 100 GRAM(S): at 09:39

## 2021-12-01 RX ADMIN — Medication 2: at 17:30

## 2021-12-01 RX ADMIN — QUETIAPINE FUMARATE 25 MILLIGRAM(S): 200 TABLET, FILM COATED ORAL at 21:27

## 2021-12-01 NOTE — PROGRESS NOTE ADULT - ASSESSMENT
56 year old female patient with multiple comorbidities including COPD on home O2, HFrEF, HTN, DL/CVA with residual left LE weakness, and DMII who presented on 11/29 for evaluation of an episode of desaturation after her O2 ran off at her PCP's clinic, found to have elevated BNP on labs and evidence of congestion on imaging, admitted for management of acute on chronic HFrEF decompensation. Currently hemodynamically stable.      #Acute on Chronic HFrEF Decompensation  -TTE 11/14: EF 15-20%, DDII, WMA, mod MR, mild-mod TR, severe PHTN, small pericardial effusion.  -Recent admission 11/08-11/17 for HFrEF Exacerbation s/p HF evaluation.   -Last evaluated by Dr Buitrago on 11/15 and plan was to perform cardiac cath post diuresis.   -Home med Torsemide 10mg QD and supposed to be on aldactone 25mg QD, Toprol 50mg QD, Dapagliflozin 10mg QD, Entresto 49/51mg BID---> pt never received meds in pharmacy  -ED Labs Pro BNP 5736 (was 7542 11/08), TSH 11/30 wnl  -CXR with evidence of congestion  -Monitor SAO2 and O2 requirements: currently home requirements 4L  -c/w Entresto 49/51mg BID  -c/w Atorvastatin 80 mg QD and Fenofibrate 145mg QD  -c/w Aspirin 81mg QD and Plavix 75mg QD per Dr Buitrago's recommendation (note from 11/15)  -restarted home spiranolactone , keep K>4 and Mg>2  -HF eval noted : Give Bumex 2 mg IV push, then start a Bumex gtt at 1 mg/hr, Start 3% Hypertonic Saline 150ml BID  -Get BMP twice daily   -Monitor in/out  -Monitor daily weight  -Net - balance of 3.8 L      #Depression  -Home med Seroquel 25 QD   -Resume Seroquel 25 QD       #COPD and chronic hypoxic RF  * On home O2 4LPM  * Home med Albuterol 2 puffs Q6h PRN, Symbicort 2 puffs BID  - Monitor SAO2 and O2 requirements: currently home requirements  - Duonebs PRN  - Resume Symbicort 2 puffs BID      #Dyslipidemia  #History of CVA with residual Left LE weakness  * Uses wheelchair at baseline  * Lipid Profile (11.10.21 @ 18:31) Cholesterol, Serum: 89 mg/dL; Triglycerides, Serum: 174 mg/dL; HDL Cholesterol, Serum: 24 mg/dL.  * Home meds Atorvastatin 80 mg QD, Aspirin 81mg QD, Plavix 75mg QD, Fenofibrate 145mg QD  -c/w Atorvastatin 80 mg QD and Fenofibrate 145mg QD  -c/w Aspirin 81mg QD and Plavix 75mg QD per Dr Biutrago's recommendation (note from 11/15)  - Duplex 11/20: No evidence of deep venous thrombosis or superficial thrombophlebitis in the bilateral lower extremities.  Incidentally noted is a occluded left superficial femoral artery with reconstitution of the popliteal artery, clinical correlation is recommended.      #HTN  * Home meds Toprol 50mg QD, Entresto 49/51mg BID, Torsemide 10mg QD, aldactone 25mg QD  * ED /99mmHg  -Monitor BP closely  -Resume Entresto 49/51mg BID  -IV diuretics as above      #DM II  * Last Hba1c 7.7 11/10  * Home meds metformin 1g BID, Glipizide 10mg BID, Dapagliflozin 10mg   - Monitor POCT premeals and at bedtime  - Start Apidra sliding Scale B for now        - DVT Prophylaxis: Lovenox 40mg Subcutaneously daily  - GI Prophylaxis: Pantoprazole 40mg PO QD  - Diet: DASH/ TLC  - Code Status: Full  - Handoff f/u: 4 pm BMP

## 2021-12-01 NOTE — PROGRESS NOTE ADULT - ASSESSMENT
Acute on chronic systolic HF /Fluid overload /pulmonary HTN      Patient remains fluid overloaded on exam  Continue Bumex gtt at 1 mg/hr   Continue 3% Hypertonic Saline 150ml BID  Continue Entresto 49-51 mg BID   Continue metoprolol XL 50 mg daily   Spironolactone 25 mg daily   Get BMP twice daily   Correct electrolytes - maintain potassium >4.0, Mg >2.1  Strict intake and output  Daily weight   Plan discussed with primary team  Needs better medication reconciliation upon discharge - she says meds were never sent to the pharmacy on previous discharge   Will continue to follow  Acute on chronic systolic HF /Fluid overload /pulmonary HTN    Patient remains fluid overloaded on exam  Continue Bumex gtt at 1 mg/hr   Continue 3% Hypertonic Saline 150ml BID  Continue Entresto 49-51 mg BID   Continue metoprolol XL 50 mg daily   Spironolactone 25 mg daily   Get BMP twice daily   Correct electrolytes - maintain potassium >4.0, Mg >2.1  Strict intake and output  Daily weight   Plan discussed with primary team  Needs better medication reconciliation upon discharge - she says meds were never sent to the pharmacy on previous discharge   Will continue to follow

## 2021-12-01 NOTE — PROGRESS NOTE ADULT - SUBJECTIVE AND OBJECTIVE BOX
Date of Admission: 21    HISTORY OF PRESENT ILLNESS: 56 year old (smoker) female patient known to have:  - Depression. Home med Seroquel 25 QD   - COPD and chronic hypoxic RF. On home O2 4LPM. Home med Albuterol 2 puffs Q6h PRN, Symbicort 2 puffs BID  - Dyslipidemia/ History of CVA with residual Left LE weakness. Uses wheelchair at baseline. Lipid Profile (11.10.21 @ 18:31) Cholesterol, Serum: 89 mg/dL; Triglycerides, Serum: 174 mg/dL; HDL Cholesterol, Serum: 24 mg/dL. Home meds Atorvastatin 80 mg QD, Aspirin 81mg QD, Plavix 75mg QD, Fenofibrate 145mg QD  - HFrEF. TTE  EF 15-20%, DDII, WMA, mod MR, mild-mod TR, severe PHTN, small pericardial effusion. Last evaluated by Dr Buitrago on 11/15 and plan was to perform cardiac cath post diuresis. Recent admission - for HFrEF Exacerbation s/p HF evaluation. Home med Torsemide 10mg QD and supposed to be on aldactone 25mg QD, Toprol 50mg QD, Dapagliflozin 10mg QD, Entresto 49/51mg BID  - HTN. Home meds Toprol 50mg QD, Entresto 49/51mg BID, Torsemide 10mg QD, aldactone 25mg QD  - DM II. Last Hba1c 7.7 11/10. Home meds metformin 1g BID, Glipizide 10mg BID, Dapagliflozin 10mg   She presented to the ED on  following an episode of desaturation at clinic.   History goes back to this afternoon when the patient's oxygen ran off.  This was followed 5 minutes later by an episode of desaturation down to 77%.  Patient had shortness of breath along with light headedness during the episode.  She otherwise denied chest pain, palpitations, diaphoresis, or nausea.  Over the last few weeks PTP, patient has noted worsening shortness of breath at rest and on exertion.  She has also noted LE swelling, weight gain, orthopnea, and PNDs.      Patient well known to heart failure team. Recent hospitalization, but she states she was unable to obtain the medications she was discharged on from her pharmacy, therefore was non compliant with heart failure medications. + SOB, orthopnea, LE edema.          PAST MEDICAL & SURGICAL HISTORY:  Hypertension  Hyperlipidemia  Anxiety and depression  COPD, severe  CHF (congestive heart failure)  Cerebrovascular accident (CVA)Multiple  Type 2 diabetes mellitus  CKD (chronic kidney disease), stage II  No significant past surgical history        FAMILY HISTORY:  No pertinent family history of premature cardiovascular disease in first degree relatives.  Mother:  of cancer at 65  Father:  of an overdose at 50    SOCIAL HISTORY:    Current every day smoker, 2 cigarettes a day, denies alcohol or drug use    Allergies  No Known Allergies    Intolerances    PHYSICAL EXAM:  General Appearance: well appearing, normal for age and gender. 	  Neck: unable to assess due to body habitus JVP, no bruit.   Cardiovascular: regular rate and rhythm S1 S2, , No murmurs, Generalized edema  Respiratory: B/L expiratory wheezing  Psychiatry: Alert and oriented x 3, Mood & affect appropriate  Gastrointestinal:  Soft, Non-tender  Skin/Integumentary : No rashes, No ecchymoses, No cyanosis	  Neurologic: Non-focal  Musculoskeletal/ extremities: Normal range of motion, No clubbing, cyanosis   Vascular: Peripheral pulses palpable 2+ bilaterally    CARDIAC MARKERS:  Serum Pro-Brain Natriuretic Peptide: 5736 pg/mL (. @ 15:10)    Serum Pro-Brain Natriuretic Peptide: 7542 pg/mL (.. @ 11:27)      TELEMETRY EVENTS: 	    EC21      Ventricular Rate 48 BPM  Atrial Rate 48 BPM  P-R Interval 142 ms  QRS Duration 90 ms  Q-T Interval 448 ms  QTC Calculation(Bazett) 400 ms  P Axis 22 degrees  R Axis 37 degrees  T Axis -13 degrees    Diagnosis Line Sinus bradycardia  Inferior infarct , age undetermined  Abnormal ECG    Confirmed by Elenita Combs MD ([id number]) on 2021 6:09:18 PM      PREVIOUS DIAGNOSTIC TESTING:      TTE 21    Summary:   1. Left ventricular ejection fraction, by visual estimation, is 35 to 40%.   2.Moderately decreased global left ventricular systolic function.   3. Multiple left ventricular regional wall motion abnormalities exist. See wall motion findings.   4. Elevated left atrial and left ventricular end-diastolic pressures.   5. Mild concentric left ventricular hypertrophy.   6. Mildly increased LV wall thickness.   7. Normal left ventricular internal cavity size.   8. Spectral Doppler shows restrictive pattern of left ventricular myocardial filling (Grade III diastolic dysfunction).  9. Moderately reduced RV systolic function.  10. Mildly enlarged left atrium.  11. Moderately enlarged right atrium.  12. Small pericardial effusion.  13. Mild to moderate mitral valve regurgitation.  14. Moderate-severe tricuspid regurgitation.  15.Mild aortic regurgitation.  16. Sclerotic aortic valve with normal opening.  17. Estimated pulmonary artery systolic pressure is 50.0 mmHg assuming a right atrial pressure of 15 mmHg, which is consistent with moderate pulmonary hypertension.  18. Pulmonary hypertension is present.  19. LA volume Index is 36.7 ml/m² ml/m2.    	    Home Medications:  Albuterol (Eqv-ProAir HFA) 90 mcg/inh inhalation aerosol: 2 puff(s) inhaled every 6 hours, As Needed (2021 11:22)  aspirin 81 mg oral tablet: 1 tab(s) orally once a day (2021 11:22)  fenofibrate 145 mg oral tablet: 1 tab(s) orally once a day (2021 11:22)  glipiZIDE 10 mg oral tablet: 1 tab(s) orally 2 times a day (2021 11:22)  Lovaza 1000 mg oral capsule: 2 cap(s) orally 2 times a day (2021 14:52)  metFORMIN 1000 mg oral tablet: 1 tab(s) orally 2 times a day Do not take until  (2021 12:58)  Plavix 75 mg oral tablet: 1 tab(s) orally once a day (2021 11:22)  Vitamin D2 1.25 mg (50,000 intl units) oral capsule: 1 cap(s) orally once a week (2021 14:52)    MEDICATIONS  (STANDING):  aspirin  chewable 81 milliGRAM(s) Oral daily  atorvastatin 80 milliGRAM(s) Oral at bedtime  budesonide 160 MICROgram(s)/formoterol 4.5 MICROgram(s) Inhaler 2 Puff(s) Inhalation two times a day  chlorhexidine 4% Liquid 1 Application(s) Topical <User Schedule>  clopidogrel Tablet 75 milliGRAM(s) Oral daily  enoxaparin Injectable 40 milliGRAM(s) SubCutaneous daily  fenofibrate Tablet 145 milliGRAM(s) Oral daily  furosemide   Injectable 40 milliGRAM(s) IV Push two times a day  magnesium sulfate  IVPB 2 Gram(s) IV Intermittent once  metoprolol succinate ER 50 milliGRAM(s) Oral daily  omega-3-Acid Ethyl Esters 2 Gram(s) Oral two times a day  pantoprazole    Tablet 40 milliGRAM(s) Oral before breakfast  sacubitril 49 mG/valsartan 51 mG 1 Tablet(s) Oral two times a day    MEDICATIONS  (PRN):  ALBUTerol    90 MICROgram(s) HFA Inhaler 2 Puff(s) Inhalation every 6 hours PRN Shortness of Breath and/or Wheezing

## 2021-12-01 NOTE — PROGRESS NOTE ADULT - ASSESSMENT
57 yo F PMHx COPD on home O2, HFrEF, HTN, DLD, CVA with residual left LE weakness wheechair bound, and DM II who presented with progressive dyspnea and hypoxia due to  acute on chronic HFrEF decompensation. Pt did not receive her diuretics at home which likely precipitated exacerbation     Acute on chronic CHF exacerbation/recurrent readmission for CHF  -TTE 11/14 EF 15-20%, DDII, WMA, mod MR, mild-mod TR, severe PHTN, small pericardial effusion  -c/w bumex ggt/hypertonic saline  -strict I and O chart  - trop 0.05 due to CHF exacerbation   -daily weight, fluid restriction  -continue toprol XL, entresto, spironolactone- monitor b/p    Acute on chronic hypoxia  - h/o COPD  - likely due to volume overload  - now on 4 L sat 96%- will taper down goal of pulse ox 89-92%  -nebs/inhalers tx.     HTN- continue meds tx. as above    CVA/Dyslipidemia- stable - continue statins tx.     DM 2   - controlled  - c/w insuiln    dVT proph- lovenox subc. tx.    code status: full code    #Progress Note Handoff:  Pending (specify): euvolemia  Family discussion: discussed continued diuresis  Disposition: Home___/SNF___/Other________/Unknown at this time________

## 2021-12-01 NOTE — PROGRESS NOTE ADULT - SUBJECTIVE AND OBJECTIVE BOX
CHIEF COMPLAINT:    Patient is a 56y old  Female who presents with a chief complaint of HFrEf     INTERVAL HPI/OVERNIGHT EVENTS:    Patient seen and examined at bedside. No acute overnight events occurred.    ROS: Denies SOB. All other systems are negative.    Vital Signs:    T(F): 97.6 (21 @ 06:00), Max: 97.6 (21 @ 06:00)  HR: 75 (21 @ 09:49) (72 - 100)  BP: 137/88 (21 @ 09:49) (117/74 - 149/73)  RR: 18 (21 @ 06:00) (18 - 18)  SpO2: 95% (21 @ 19:56) (94% - 95%)  I&O's Summary    2021 07:01  -  01 Dec 2021 07:00  --------------------------------------------------------  IN: 710 mL / OUT: 4600 mL / NET: -3890 mL    01 Dec 2021 07:01  -  01 Dec 2021 13:46  --------------------------------------------------------  IN: 200 mL / OUT: 1800 mL / NET: -1600 mL      Daily     Daily Weight in k.5 (01 Dec 2021 04:03)  CAPILLARY BLOOD GLUCOSE      POCT Blood Glucose.: 191 mg/dL (01 Dec 2021 11:44)  POCT Blood Glucose.: 120 mg/dL (2021 21:21)  POCT Blood Glucose.: 106 mg/dL (2021 16:32)      PHYSICAL EXAM:  GENERAL:  NAD  SKIN: No rashes or lesions  HEENT: Atraumatic. Normocephalic. Anicteric  NECK:  No JVD.   PULMONARY: Clear to ausculation bilaterally. No wheezing. No rales  CVS: Normal S1, S2. Regular rate and rhythm. No murmurs.  ABDOMEN/GI: Soft, Nontender, Nondistended; Bowel sounds are present  EXTREMITIES: 1+ pitting edema b/l   NEUROLOGIC:  No motor deficit.  PSYCH: Alert & oriented x 3, normal affect      LABS:                        13.0   4.68  )-----------( 273      ( 01 Dec 2021 05:00 )             40.8     12    142  |  98  |  17  ----------------------------<  102<H>  3.5   |  24  |  0.8    Ca    8.9      01 Dec 2021 05:00  Phos  3.7     11-30  Mg     1.2         TPro  7.0  /  Alb  3.7  /  TBili  1.3<H>  /  DBili  x   /  AST  17  /  ALT  14  /  AlkPhos  105        Serum Pro-Brain Natriuretic Peptide: 5736 pg/mL (21 @ 15:10)    Trop 0.05, CKMB --, CK --, 21 @ 15:10        RADIOLOGY & ADDITIONAL TESTS:  Imaging or report Personally Reviewed:  [ ] YES  [ ] NO    Telemetry reviewed independently - no events    Medications:  Standing  aspirin enteric coated 81 milliGRAM(s) Oral daily  atorvastatin 80 milliGRAM(s) Oral at bedtime  budesonide 160 MICROgram(s)/formoterol 4.5 MICROgram(s) Inhaler 2 Puff(s) Inhalation two times a day  buMETAnide Infusion 1 mG/Hr IV Continuous <Continuous>  chlorhexidine 4% Liquid 1 Application(s) Topical <User Schedule>  clopidogrel Tablet 75 milliGRAM(s) Oral daily  dextrose 40% Gel 15 Gram(s) Oral once  dextrose 5%. 1000 milliLiter(s) IV Continuous <Continuous>  dextrose 5%. 1000 milliLiter(s) IV Continuous <Continuous>  dextrose 50% Injectable 25 Gram(s) IV Push once  dextrose 50% Injectable 12.5 Gram(s) IV Push once  dextrose 50% Injectable 25 Gram(s) IV Push once  enoxaparin Injectable 40 milliGRAM(s) SubCutaneous daily  fenofibrate Tablet 145 milliGRAM(s) Oral daily  glucagon  Injectable 1 milliGRAM(s) IntraMuscular once  influenza   Vaccine 0.5 milliLiter(s) IntraMuscular once  insulin lispro (ADMELOG) corrective regimen sliding scale   SubCutaneous three times a day before meals  metoprolol succinate ER 50 milliGRAM(s) Oral daily  pantoprazole    Tablet 40 milliGRAM(s) Oral before breakfast  potassium chloride   Powder 20 milliEquivalent(s) Oral once  QUEtiapine 25 milliGRAM(s) Oral at bedtime  sacubitril 49 mG/valsartan 51 mG 1 Tablet(s) Oral two times a day  sodium chloride 3%. 150 milliLiter(s) IV Continuous <Continuous>  sodium chloride 3%. 150 milliLiter(s) IV Continuous <Continuous>  spironolactone 25 milliGRAM(s) Oral daily    PRN Meds  ALBUTerol    90 MICROgram(s) HFA Inhaler 2 Puff(s) Inhalation every 6 hours PRN      Case discussed with resident  Care discussed with pt

## 2021-12-02 LAB
ALBUMIN SERPL ELPH-MCNC: 3.8 G/DL — SIGNIFICANT CHANGE UP (ref 3.5–5.2)
ALBUMIN SERPL ELPH-MCNC: 3.9 G/DL — SIGNIFICANT CHANGE UP (ref 3.5–5.2)
ALP SERPL-CCNC: 100 U/L — SIGNIFICANT CHANGE UP (ref 30–115)
ALP SERPL-CCNC: 101 U/L — SIGNIFICANT CHANGE UP (ref 30–115)
ALT FLD-CCNC: 12 U/L — SIGNIFICANT CHANGE UP (ref 0–41)
ALT FLD-CCNC: 12 U/L — SIGNIFICANT CHANGE UP (ref 0–41)
ANION GAP SERPL CALC-SCNC: 18 MMOL/L — HIGH (ref 7–14)
ANION GAP SERPL CALC-SCNC: 20 MMOL/L — HIGH (ref 7–14)
AST SERPL-CCNC: 16 U/L — SIGNIFICANT CHANGE UP (ref 0–41)
AST SERPL-CCNC: 17 U/L — SIGNIFICANT CHANGE UP (ref 0–41)
BASOPHILS # BLD AUTO: 0.05 K/UL — SIGNIFICANT CHANGE UP (ref 0–0.2)
BASOPHILS NFR BLD AUTO: 1.1 % — HIGH (ref 0–1)
BILIRUB DIRECT SERPL-MCNC: 0.6 MG/DL — HIGH (ref 0–0.3)
BILIRUB INDIRECT FLD-MCNC: 0.6 MG/DL — SIGNIFICANT CHANGE UP (ref 0.2–1.2)
BILIRUB SERPL-MCNC: 1.2 MG/DL — SIGNIFICANT CHANGE UP (ref 0.2–1.2)
BILIRUB SERPL-MCNC: 2.3 MG/DL — HIGH (ref 0.2–1.2)
BUN SERPL-MCNC: 16 MG/DL — SIGNIFICANT CHANGE UP (ref 10–20)
BUN SERPL-MCNC: 18 MG/DL — SIGNIFICANT CHANGE UP (ref 10–20)
CALCIUM SERPL-MCNC: 9 MG/DL — SIGNIFICANT CHANGE UP (ref 8.5–10.1)
CALCIUM SERPL-MCNC: 9.4 MG/DL — SIGNIFICANT CHANGE UP (ref 8.5–10.1)
CHLORIDE SERPL-SCNC: 89 MMOL/L — LOW (ref 98–110)
CHLORIDE SERPL-SCNC: 92 MMOL/L — LOW (ref 98–110)
CO2 SERPL-SCNC: 27 MMOL/L — SIGNIFICANT CHANGE UP (ref 17–32)
CO2 SERPL-SCNC: 28 MMOL/L — SIGNIFICANT CHANGE UP (ref 17–32)
CREAT SERPL-MCNC: 0.8 MG/DL — SIGNIFICANT CHANGE UP (ref 0.7–1.5)
CREAT SERPL-MCNC: 1 MG/DL — SIGNIFICANT CHANGE UP (ref 0.7–1.5)
EOSINOPHIL # BLD AUTO: 0.11 K/UL — SIGNIFICANT CHANGE UP (ref 0–0.7)
EOSINOPHIL NFR BLD AUTO: 2.4 % — SIGNIFICANT CHANGE UP (ref 0–8)
GLUCOSE BLDC GLUCOMTR-MCNC: 120 MG/DL — HIGH (ref 70–99)
GLUCOSE BLDC GLUCOMTR-MCNC: 126 MG/DL — HIGH (ref 70–99)
GLUCOSE BLDC GLUCOMTR-MCNC: 162 MG/DL — HIGH (ref 70–99)
GLUCOSE BLDC GLUCOMTR-MCNC: 163 MG/DL — HIGH (ref 70–99)
GLUCOSE SERPL-MCNC: 125 MG/DL — HIGH (ref 70–99)
GLUCOSE SERPL-MCNC: 143 MG/DL — HIGH (ref 70–99)
HCT VFR BLD CALC: 44.2 % — SIGNIFICANT CHANGE UP (ref 37–47)
HGB BLD-MCNC: 14.3 G/DL — SIGNIFICANT CHANGE UP (ref 12–16)
IMM GRANULOCYTES NFR BLD AUTO: 0.4 % — HIGH (ref 0.1–0.3)
LYMPHOCYTES # BLD AUTO: 1.16 K/UL — LOW (ref 1.2–3.4)
LYMPHOCYTES # BLD AUTO: 25 % — SIGNIFICANT CHANGE UP (ref 20.5–51.1)
MAGNESIUM SERPL-MCNC: 1.3 MG/DL — LOW (ref 1.8–2.4)
MCHC RBC-ENTMCNC: 28.5 PG — SIGNIFICANT CHANGE UP (ref 27–31)
MCHC RBC-ENTMCNC: 32.4 G/DL — SIGNIFICANT CHANGE UP (ref 32–37)
MCV RBC AUTO: 88.2 FL — SIGNIFICANT CHANGE UP (ref 81–99)
MONOCYTES # BLD AUTO: 0.31 K/UL — SIGNIFICANT CHANGE UP (ref 0.1–0.6)
MONOCYTES NFR BLD AUTO: 6.7 % — SIGNIFICANT CHANGE UP (ref 1.7–9.3)
NEUTROPHILS # BLD AUTO: 2.99 K/UL — SIGNIFICANT CHANGE UP (ref 1.4–6.5)
NEUTROPHILS NFR BLD AUTO: 64.4 % — SIGNIFICANT CHANGE UP (ref 42.2–75.2)
NRBC # BLD: 0 /100 WBCS — SIGNIFICANT CHANGE UP (ref 0–0)
PLATELET # BLD AUTO: 299 K/UL — SIGNIFICANT CHANGE UP (ref 130–400)
POTASSIUM SERPL-MCNC: 3.1 MMOL/L — LOW (ref 3.5–5)
POTASSIUM SERPL-MCNC: 4.1 MMOL/L — SIGNIFICANT CHANGE UP (ref 3.5–5)
POTASSIUM SERPL-SCNC: 3.1 MMOL/L — LOW (ref 3.5–5)
POTASSIUM SERPL-SCNC: 4.1 MMOL/L — SIGNIFICANT CHANGE UP (ref 3.5–5)
PROT SERPL-MCNC: 7.1 G/DL — SIGNIFICANT CHANGE UP (ref 6–8)
PROT SERPL-MCNC: 7.2 G/DL — SIGNIFICANT CHANGE UP (ref 6–8)
RBC # BLD: 5.01 M/UL — SIGNIFICANT CHANGE UP (ref 4.2–5.4)
RBC # FLD: 17.3 % — HIGH (ref 11.5–14.5)
SODIUM SERPL-SCNC: 137 MMOL/L — SIGNIFICANT CHANGE UP (ref 135–146)
SODIUM SERPL-SCNC: 137 MMOL/L — SIGNIFICANT CHANGE UP (ref 135–146)
WBC # BLD: 4.64 K/UL — LOW (ref 4.8–10.8)
WBC # FLD AUTO: 4.64 K/UL — LOW (ref 4.8–10.8)

## 2021-12-02 PROCEDURE — 99233 SBSQ HOSP IP/OBS HIGH 50: CPT

## 2021-12-02 PROCEDURE — 99232 SBSQ HOSP IP/OBS MODERATE 35: CPT

## 2021-12-02 RX ORDER — SODIUM CHLORIDE 5 G/100ML
150 INJECTION, SOLUTION INTRAVENOUS
Refills: 0 | Status: DISCONTINUED | OUTPATIENT
Start: 2021-12-03 | End: 2021-12-04

## 2021-12-02 RX ORDER — POTASSIUM CHLORIDE 20 MEQ
20 PACKET (EA) ORAL
Refills: 0 | Status: COMPLETED | OUTPATIENT
Start: 2021-12-02 | End: 2021-12-02

## 2021-12-02 RX ORDER — SODIUM CHLORIDE 5 G/100ML
150 INJECTION, SOLUTION INTRAVENOUS
Refills: 0 | Status: DISCONTINUED | OUTPATIENT
Start: 2021-12-04 | End: 2021-12-04

## 2021-12-02 RX ORDER — MAGNESIUM SULFATE 500 MG/ML
2 VIAL (ML) INJECTION ONCE
Refills: 0 | Status: COMPLETED | OUTPATIENT
Start: 2021-12-02 | End: 2021-12-02

## 2021-12-02 RX ORDER — POTASSIUM CHLORIDE 20 MEQ
20 PACKET (EA) ORAL ONCE
Refills: 0 | Status: COMPLETED | OUTPATIENT
Start: 2021-12-02 | End: 2021-12-02

## 2021-12-02 RX ORDER — POTASSIUM CHLORIDE 20 MEQ
40 PACKET (EA) ORAL ONCE
Refills: 0 | Status: DISCONTINUED | OUTPATIENT
Start: 2021-12-02 | End: 2021-12-02

## 2021-12-02 RX ORDER — MAGNESIUM SULFATE 500 MG/ML
2 VIAL (ML) INJECTION
Refills: 0 | Status: COMPLETED | OUTPATIENT
Start: 2021-12-02 | End: 2021-12-02

## 2021-12-02 RX ORDER — SODIUM CHLORIDE 5 G/100ML
150 INJECTION, SOLUTION INTRAVENOUS
Refills: 0 | Status: COMPLETED | OUTPATIENT
Start: 2021-12-02 | End: 2021-12-02

## 2021-12-02 RX ADMIN — SACUBITRIL AND VALSARTAN 1 TABLET(S): 24; 26 TABLET, FILM COATED ORAL at 05:41

## 2021-12-02 RX ADMIN — CLOPIDOGREL BISULFATE 75 MILLIGRAM(S): 75 TABLET, FILM COATED ORAL at 12:39

## 2021-12-02 RX ADMIN — Medication 20 MILLIEQUIVALENT(S): at 12:39

## 2021-12-02 RX ADMIN — ENOXAPARIN SODIUM 40 MILLIGRAM(S): 100 INJECTION SUBCUTANEOUS at 12:39

## 2021-12-02 RX ADMIN — BUDESONIDE AND FORMOTEROL FUMARATE DIHYDRATE 2 PUFF(S): 160; 4.5 AEROSOL RESPIRATORY (INHALATION) at 08:40

## 2021-12-02 RX ADMIN — Medication 20 MILLIEQUIVALENT(S): at 10:44

## 2021-12-02 RX ADMIN — PANTOPRAZOLE SODIUM 40 MILLIGRAM(S): 20 TABLET, DELAYED RELEASE ORAL at 06:30

## 2021-12-02 RX ADMIN — Medication 145 MILLIGRAM(S): at 12:38

## 2021-12-02 RX ADMIN — Medication 81 MILLIGRAM(S): at 12:39

## 2021-12-02 RX ADMIN — ATORVASTATIN CALCIUM 80 MILLIGRAM(S): 80 TABLET, FILM COATED ORAL at 21:26

## 2021-12-02 RX ADMIN — SACUBITRIL AND VALSARTAN 1 TABLET(S): 24; 26 TABLET, FILM COATED ORAL at 17:27

## 2021-12-02 RX ADMIN — SPIRONOLACTONE 25 MILLIGRAM(S): 25 TABLET, FILM COATED ORAL at 05:42

## 2021-12-02 RX ADMIN — Medication 2: at 17:27

## 2021-12-02 RX ADMIN — Medication 50 GRAM(S): at 12:59

## 2021-12-02 RX ADMIN — Medication 50 GRAM(S): at 11:38

## 2021-12-02 RX ADMIN — SODIUM CHLORIDE 50 MILLILITER(S): 5 INJECTION, SOLUTION INTRAVENOUS at 18:46

## 2021-12-02 RX ADMIN — CHLORHEXIDINE GLUCONATE 1 APPLICATION(S): 213 SOLUTION TOPICAL at 05:39

## 2021-12-02 RX ADMIN — Medication 50 MILLIGRAM(S): at 05:42

## 2021-12-02 RX ADMIN — Medication 50 GRAM(S): at 10:43

## 2021-12-02 RX ADMIN — Medication 20 MILLIEQUIVALENT(S): at 09:01

## 2021-12-02 RX ADMIN — QUETIAPINE FUMARATE 25 MILLIGRAM(S): 200 TABLET, FILM COATED ORAL at 21:26

## 2021-12-02 NOTE — PROGRESS NOTE ADULT - SUBJECTIVE AND OBJECTIVE BOX
Hospital Day:  3d    Subjective: Patient is a 56y old  Female who presents with a chief complaint of HFrEf (01 Dec 2021 11:01)      Pt seen and evaluated at bedside.   Complaints: None  Over the night Events: None    Past Medical Hx:   Hypertension    Hyperlipidemia    Anxiety and depression    COPD, severe    CHF (congestive heart failure)    Cerebrovascular accident (CVA)    Type 2 diabetes mellitus    CKD (chronic kidney disease), stage II      Past Sx:  No significant past surgical history    No significant past surgical history      Allergies:  No Known Allergies    Current Meds:   Standng Meds:  aspirin enteric coated 81 milliGRAM(s) Oral daily  atorvastatin 80 milliGRAM(s) Oral at bedtime  budesonide 160 MICROgram(s)/formoterol 4.5 MICROgram(s) Inhaler 2 Puff(s) Inhalation two times a day  buMETAnide Infusion 1 mG/Hr (5 mL/Hr) IV Continuous <Continuous>  chlorhexidine 4% Liquid 1 Application(s) Topical <User Schedule>  clopidogrel Tablet 75 milliGRAM(s) Oral daily  dextrose 40% Gel 15 Gram(s) Oral once  dextrose 5%. 1000 milliLiter(s) (50 mL/Hr) IV Continuous <Continuous>  dextrose 5%. 1000 milliLiter(s) (100 mL/Hr) IV Continuous <Continuous>  dextrose 50% Injectable 25 Gram(s) IV Push once  dextrose 50% Injectable 12.5 Gram(s) IV Push once  dextrose 50% Injectable 25 Gram(s) IV Push once  enoxaparin Injectable 40 milliGRAM(s) SubCutaneous daily  fenofibrate Tablet 145 milliGRAM(s) Oral daily  glucagon  Injectable 1 milliGRAM(s) IntraMuscular once  influenza   Vaccine 0.5 milliLiter(s) IntraMuscular once  insulin lispro (ADMELOG) corrective regimen sliding scale   SubCutaneous three times a day before meals  magnesium sulfate  IVPB 2 Gram(s) IV Intermittent once  metoprolol succinate ER 50 milliGRAM(s) Oral daily  pantoprazole    Tablet 40 milliGRAM(s) Oral before breakfast  potassium chloride    Tablet ER 20 milliEquivalent(s) Oral once  potassium chloride   Powder 40 milliEquivalent(s) Oral once  QUEtiapine 25 milliGRAM(s) Oral at bedtime  sacubitril 49 mG/valsartan 51 mG 1 Tablet(s) Oral two times a day  sodium chloride 3%. 150 milliLiter(s) (50 mL/Hr) IV Continuous <Continuous>  sodium chloride 3%. 150 milliLiter(s) (50 mL/Hr) IV Continuous <Continuous>  spironolactone 25 milliGRAM(s) Oral daily    PRN Meds:  ALBUTerol    90 MICROgram(s) HFA Inhaler 2 Puff(s) Inhalation every 6 hours PRN Shortness of Breath and/or Wheezing      Vital Signs:   T(F): 98.7 (12-02-21 @ 04:00), Max: 98.7 (12-02-21 @ 04:00)  HR: 75 (12-02-21 @ 04:00) (70 - 97)  BP: 117/75 (12-02-21 @ 04:00) (117/75 - 141/65)  RR: 18 (12-02-21 @ 00:00) (16 - 18)  SpO2: --    Physical Exam:   GENERAL: NAD, Resting in bed  HEENT: NCAT  CHEST/LUNG: Clear to auscultation bilaterally; No wheezing or rubs.   HEART: Regular rate and rhythm; No murmurs, rubs, or gallops  ABDOMEN: Bowel sounds present; Soft, Nontender, Nondistended.   EXTREMITIES:  No clubbing, cyanosis, or edema  NERVOUS SYSTEM:  Alert & Oriented X3    FLUID BALANCE    11-30-21 @ 07:01  -  12-01-21 @ 07:00  --------------------------------------------------------  IN: 710 mL / OUT: 4600 mL / NET: -3890 mL    12-01-21 @ 07:01  -  12-02-21 @ 07:00  --------------------------------------------------------  IN: 1463 mL / OUT: 8100 mL / NET: -6637 mL    12-02-21 @ 07:01  -  12-02-21 @ 08:36  --------------------------------------------------------  IN: 420 mL / OUT: 0 mL / NET: 420 mL        Labs:                         14.3   4.64  )-----------( 299      ( 02 Dec 2021 06:10 )             44.2     Neutophil% 64.4, Lymphocyte% 25.0, Monocyte% 6.7, Bands% 0.4 12-02-21 @ 06:10    02 Dec 2021 06:10    137    |  89     |  16     ----------------------------<  143    3.1     |  28     |  0.8      Ca    9.4        02 Dec 2021 06:10  Mg     1.3       02 Dec 2021 06:10    TPro  7.1    /  Alb  3.9    /  TBili  2.3    /  DBili  x      /  AST  16     /  ALT  12     /  AlkPhos  100    02 Dec 2021 06:10            Serum Pro-Brain Natriuretic Peptide: 5736 pg/mL (11-29-21 @ 15:10)    Troponin 0.05, CKMB --, CK -- 11-29-21 @ 15:      Radiology:     < from: Xray Chest 1 View- PORTABLE-Urgent (11.29.21 @ 17:18) >  Impression:    Mild congestive heart failure.        --- End of Report ---      < end of copied text >

## 2021-12-02 NOTE — PROGRESS NOTE ADULT - ASSESSMENT
55 yo F PMHx COPD on home O2, HFrEF, HTN, DLD, CVA with residual left LE weakness wheechair bound, and DM II who presented with progressive dyspnea and hypoxia due to  acute on chronic HFrEF decompensation. Pt did not receive her diuretics at home which likely precipitated exacerbation     Acute on chronic CHF exacerbation/recurrent readmission for CHF  -TTE 11/14 EF 15-20%, DDII, WMA, mod MR, mild-mod TR, severe PHTN, small pericardial effusion  -c/w bumex ggt/hypertonic saline  -strict I and O chart  - trop 0.05 l;ikely due to cardiac stretch from CHF exacerbation   -daily weight, fluid restriction  -continue toprol XL, entresto, spironolactone- monitor b/p    Acute on chronic hypoxia  - h/o COPD  - likely due to volume overload  - now on 4 L sat 96%- will taper down goal of pulse ox 89-92%  -nebs/inhalers tx.     HTN  - continue  toprol, spironolactone    CVA/Dyslipidemia-   stable - continue atrovatatin, aspirin    DM 2   - controlled  - c/w insulin    dVT proph- lovenox subc. tx.  code status: full code    #Progress Note Handoff:  Pending (specify): euvolemia  Family discussion: discussed continued diuresis  Disposition: Home___/SNF___/Other________/Unknown at this time________

## 2021-12-02 NOTE — PROGRESS NOTE ADULT - ASSESSMENT
56 year old female patient with multiple comorbidities including COPD on home O2, HFrEF, HTN, DL/CVA with residual left LE weakness, and DMII who presented on 11/29 for evaluation of an episode of desaturation after her O2 ran off at her PCP's clinic, found to have elevated BNP on labs and evidence of congestion on imaging, admitted for management of acute on chronic HFrEF decompensation. Currently hemodynamically stable.      #Acute on Chronic HFrEF Decompensation  -TTE 11/14: EF 15-20%, DDII, WMA, mod MR, mild-mod TR, severe PHTN, small pericardial effusion.  -Recent admission 11/08-11/17 for HFrEF Exacerbation s/p HF evaluation.   -Last evaluated by Dr Buitrago on 11/15 and plan was to perform cardiac cath post diuresis.   -Home med Torsemide 10mg QD and supposed to be on aldactone 25mg QD, Toprol 50mg QD, Dapagliflozin 10mg QD, Entresto 49/51mg BID---> pt never received meds in pharmacy  -ED Labs Pro BNP 5736 (was 7542 11/08), TSH 11/30 wnl  -CXR with evidence of congestion  -Monitor SAO2 and O2 requirements: currently home requirements 4L  -c/w Entresto 49/51mg BID  -c/w Atorvastatin 80 mg QD and Fenofibrate 145mg QD  -c/w Aspirin 81mg QD and Plavix 75mg QD per Dr Buitrago's recommendation (note from 11/15)  -restarted home spiranolactone , keep K>4 and Mg>2  -HF eval noted 12/1 :   Continue Bumex gtt at 1 mg/hr   Continue 3% Hypertonic Saline 150ml BID  Continue Entresto 49-51 mg BID   Continue metoprolol XL 50 mg daily   Spironolactone 25 mg daily -Get BMP twice daily   -Monitor in/out  -Monitor daily weight  -Net - balance of 6.6 L      #Depression  -Home med Seroquel 25 QD   -Resume Seroquel 25 QD       #COPD and chronic hypoxic RF  * On home O2 4LPM  * Home med Albuterol 2 puffs Q6h PRN, Symbicort 2 puffs BID  - Monitor SAO2 and O2 requirements: currently home requirements  - Duonebs PRN  - Resume Symbicort 2 puffs BID      #Dyslipidemia  #History of CVA with residual Left LE weakness  * Uses wheelchair at baseline  * Lipid Profile (11.10.21 @ 18:31) Cholesterol, Serum: 89 mg/dL; Triglycerides, Serum: 174 mg/dL; HDL Cholesterol, Serum: 24 mg/dL.  * Home meds Atorvastatin 80 mg QD, Aspirin 81mg QD, Plavix 75mg QD, Fenofibrate 145mg QD  -c/w Atorvastatin 80 mg QD and Fenofibrate 145mg QD  -c/w Aspirin 81mg QD and Plavix 75mg QD per Dr Buitrago's recommendation (note from 11/15)  - Duplex 11/20: No evidence of deep venous thrombosis or superficial thrombophlebitis in the bilateral lower extremities.  Incidentally noted is a occluded left superficial femoral artery with reconstitution of the popliteal artery, clinical correlation is recommended.      #HTN  * Home meds Toprol 50mg QD, Entresto 49/51mg BID, Torsemide 10mg QD, aldactone 25mg QD  * ED /99mmHg  -Monitor BP closely  -c/w Entresto 49/51mg BID  -diuretics as above      #DM II  * Last Hba1c 7.7 11/10  * Home meds metformin 1g BID, Glipizide 10mg BID, Dapagliflozin 10mg   - Monitor POCT premeals and at bedtime  - c/w sliding Scale for now        - DVT Prophylaxis: Lovenox 40mg Subcutaneously daily  - GI Prophylaxis: Pantoprazole 40mg PO QD  - Diet: DASH/ TLC  - Code Status: Full  - Handoff f/u: 4 pm BMP keep K>4, Mg>2.1 56 year old female patient with multiple comorbidities including COPD on home O2, HFrEF, HTN, DL/CVA with residual left LE weakness, and DMII who presented on 11/29 for evaluation of an episode of desaturation after her O2 ran off at her PCP's clinic, found to have elevated BNP on labs and evidence of congestion on imaging, admitted for management of acute on chronic HFrEF decompensation. Currently hemodynamically stable.      #Acute on Chronic HFrEF Decompensation  -TTE 11/14: EF 15-20%, DDII, WMA, mod MR, mild-mod TR, severe PHTN, small pericardial effusion.  -Recent admission 11/08-11/17 for HFrEF Exacerbation s/p HF evaluation.   -Last evaluated by Dr Buitrago on 11/15 and plan was to perform cardiac cath post diuresis.   -Home med Torsemide 10mg QD and supposed to be on aldactone 25mg QD, Toprol 50mg QD, Dapagliflozin 10mg QD, Entresto 49/51mg BID---> pt never received meds in pharmacy  -ED Labs Pro BNP 5736 (was 7542 11/08), TSH 11/30 wnl  -CXR with evidence of congestion  -Monitor SAO2 and O2 requirements: currently home requirements 4L  -c/w Entresto 49/51mg BID  -c/w Atorvastatin 80 mg QD and Fenofibrate 145mg QD  -c/w Aspirin 81mg QD and Plavix 75mg QD per Dr Buitrago's recommendation (note from 11/15)  -restarted home spiranolactone , keep K>4 and Mg>2  -HF eval noted 12/1 :   Continue Bumex gtt at 1 mg/hr   Continue 3% Hypertonic Saline 150ml BID  Continue Entresto 49-51 mg BID   Continue metoprolol XL 50 mg daily   Spironolactone 25 mg daily -Get BMP twice daily   -Monitor in/out  -Monitor daily weight  -Net - balance of 6.6 L      #Elevated Bilirubin  -f/u HFP  - no evidence of hemolysis       #COPD and chronic hypoxic RF  * On home O2 4LPM  * Home med Albuterol 2 puffs Q6h PRN, Symbicort 2 puffs BID  - Monitor SAO2 and O2 requirements: currently home requirements  - Duonebs PRN  - Resume Symbicort 2 puffs BID      #Dyslipidemia  #History of CVA with residual Left LE weakness  * Uses wheelchair at baseline  * Lipid Profile (11.10.21 @ 18:31) Cholesterol, Serum: 89 mg/dL; Triglycerides, Serum: 174 mg/dL; HDL Cholesterol, Serum: 24 mg/dL.  * Home meds Atorvastatin 80 mg QD, Aspirin 81mg QD, Plavix 75mg QD, Fenofibrate 145mg QD  -c/w Atorvastatin 80 mg QD and Fenofibrate 145mg QD  -c/w Aspirin 81mg QD and Plavix 75mg QD per Dr Buitrago's recommendation (note from 11/15)  - Duplex 11/20: No evidence of deep venous thrombosis or superficial thrombophlebitis in the bilateral lower extremities.  Incidentally noted is a occluded left superficial femoral artery with reconstitution of the popliteal artery, clinical correlation is recommended.        #Depression  -Home med Seroquel 25 QD   -Resume Seroquel 25 QD      #HTN  * Home meds Toprol 50mg QD, Entresto 49/51mg BID, Torsemide 10mg QD, aldactone 25mg QD  * ED /99mmHg  -Monitor BP closely  -c/w Entresto 49/51mg BID  -diuretics as above      #DM II  * Last Hba1c 7.7 11/10  * Home meds metformin 1g BID, Glipizide 10mg BID, Dapagliflozin 10mg   - Monitor POCT premeals and at bedtime  - c/w sliding Scale for now        - DVT Prophylaxis: Lovenox 40mg Subcutaneously daily  - GI Prophylaxis: Pantoprazole 40mg PO QD  - Diet: DASH/ TLC  - Code Status: Full  - Handoff f/u: 4 pm BMP keep K>4, Mg>2.1

## 2021-12-02 NOTE — PROGRESS NOTE ADULT - ASSESSMENT
Acute on chronic systolic HF /Fluid overload /pulmonary HTN    Patient remains fluid overloaded on exam  Continue Bumex gtt at 1 mg/hr   Continue 3% Hypertonic Saline 150ml BID  Continue Entresto 49-51 mg BID   Continue metoprolol XL 50 mg daily   Spironolactone 25 mg daily   Get BMP twice daily   Correct electrolytes- K 3.1 today, Mag 1.3  Maintain potassium >4.0, Mg >2.1  Strict intake and output  Daily weight   Plan discussed with primary team  Needs better medication reconciliation upon discharge - she says meds were never sent to the pharmacy on previous discharge   Will continue to follow

## 2021-12-02 NOTE — PROGRESS NOTE ADULT - SUBJECTIVE AND OBJECTIVE BOX
CHIEF COMPLAINT:    Patient is a 56y old  Female who presents with a chief complaint of CHF exacerbation     INTERVAL HPI/OVERNIGHT EVENTS:    Patient seen and examined at bedside. No acute overnight events occurred.    ROS: Denies SOB, chest pain. All other systems are negative.    Vital Signs:    T(F): 97.4 (21 @ 12:12), Max: 98.7 (21 @ 04:00)  HR: 74 (21 @ 12:12) (70 - 97)  BP: 112/74 (21 @ 12:12) (112/74 - 139/81)  RR: 16 (21 @ 12:12) (16 - 20)  SpO2: 92% (21 @ 08:45) (92% - 92%)  I&O's Summary    01 Dec 2021 07:01  -  02 Dec 2021 07:00  --------------------------------------------------------  IN: 1463 mL / OUT: 8100 mL / NET: -6637 mL    02 Dec 2021 07:01  -  02 Dec 2021 15:56  --------------------------------------------------------  IN: 1000 mL / OUT: 1700 mL / NET: -700 mL      Daily     Daily Weight in k.7 (02 Dec 2021 04:00)  CAPILLARY BLOOD GLUCOSE      POCT Blood Glucose.: 162 mg/dL (02 Dec 2021 11:34)  POCT Blood Glucose.: 120 mg/dL (02 Dec 2021 07:32)  POCT Blood Glucose.: 152 mg/dL (01 Dec 2021 17:29)      PHYSICAL EXAM:  GENERAL:  NAD  SKIN: No rashes or lesions  HEENT: Atraumatic. Normocephalic. Anicteric  NECK:  No JVD.   PULMONARY: Clear to ausculation bilaterally. No wheezing. No rales  CVS: Normal S1, S2. Regular rate and rhythm. No murmurs.  ABDOMEN/GI: Soft, Nontender, Nondistended; Bowel sounds are present  EXTREMITIES:  trace edema B/L LE.  NEUROLOGIC:  No motor deficit.  PSYCH: Alert & oriented x 3, normal affect        LABS:                        14.3   4.64  )-----------( 299      ( 02 Dec 2021 06:10 )             44.2     12-    137  |  89<L>  |  16  ----------------------------<  143<H>  3.1<L>   |  28  |  0.8    Ca    9.4      02 Dec 2021 06:10  Mg     1.3     12-    TPro  7.2  /  Alb  3.8  /  TBili  1.2  /  DBili  0.6<H>  /  AST  17  /  ALT  12  /  AlkPhos  101        Serum Pro-Brain Natriuretic Peptide: 5736 pg/mL (21 @ 15:10)          RADIOLOGY & ADDITIONAL TESTS:  Imaging or report Personally Reviewed:  [ ] YES  [ ] NO    Telemetry reviewed independently - no events    Medications:  Standing  aspirin enteric coated 81 milliGRAM(s) Oral daily  atorvastatin 80 milliGRAM(s) Oral at bedtime  budesonide 160 MICROgram(s)/formoterol 4.5 MICROgram(s) Inhaler 2 Puff(s) Inhalation two times a day  buMETAnide Infusion 1 mG/Hr IV Continuous <Continuous>  chlorhexidine 4% Liquid 1 Application(s) Topical <User Schedule>  clopidogrel Tablet 75 milliGRAM(s) Oral daily  dextrose 40% Gel 15 Gram(s) Oral once  dextrose 5%. 1000 milliLiter(s) IV Continuous <Continuous>  dextrose 5%. 1000 milliLiter(s) IV Continuous <Continuous>  dextrose 50% Injectable 25 Gram(s) IV Push once  dextrose 50% Injectable 12.5 Gram(s) IV Push once  dextrose 50% Injectable 25 Gram(s) IV Push once  enoxaparin Injectable 40 milliGRAM(s) SubCutaneous daily  fenofibrate Tablet 145 milliGRAM(s) Oral daily  glucagon  Injectable 1 milliGRAM(s) IntraMuscular once  influenza   Vaccine 0.5 milliLiter(s) IntraMuscular once  insulin lispro (ADMELOG) corrective regimen sliding scale   SubCutaneous three times a day before meals  metoprolol succinate ER 50 milliGRAM(s) Oral daily  pantoprazole    Tablet 40 milliGRAM(s) Oral before breakfast  QUEtiapine 25 milliGRAM(s) Oral at bedtime  sacubitril 49 mG/valsartan 51 mG 1 Tablet(s) Oral two times a day  sodium chloride 3%. 150 milliLiter(s) IV Continuous <Continuous>  sodium chloride 3%. 150 milliLiter(s) IV Continuous <Continuous>  spironolactone 25 milliGRAM(s) Oral daily    PRN Meds  ALBUTerol    90 MICROgram(s) HFA Inhaler 2 Puff(s) Inhalation every 6 hours PRN      Case discussed with resident  Care discussed with pt

## 2021-12-02 NOTE — PROGRESS NOTE ADULT - SUBJECTIVE AND OBJECTIVE BOX
Date of Admission: 21    Interval History:  - Patient assessed in bed, NAD  - Remains fluid overloaded on exam- POCUS exam: IVC 2.3cm non collapsing       HISTORY OF PRESENT ILLNESS: 56 year old (smoker) female patient known to have:  - Depression. Home med Seroquel 25 QD   - COPD and chronic hypoxic RF. On home O2 4LPM. Home med Albuterol 2 puffs Q6h PRN, Symbicort 2 puffs BID  - Dyslipidemia/ History of CVA with residual Left LE weakness. Uses wheelchair at baseline. Lipid Profile (11.10.21 @ 18:31) Cholesterol, Serum: 89 mg/dL; Triglycerides, Serum: 174 mg/dL; HDL Cholesterol, Serum: 24 mg/dL. Home meds Atorvastatin 80 mg QD, Aspirin 81mg QD, Plavix 75mg QD, Fenofibrate 145mg QD  - HFrEF. TTE  EF 15-20%, DDII, WMA, mod MR, mild-mod TR, severe PHTN, small pericardial effusion. Last evaluated by Dr Buitrago on 11/15 and plan was to perform cardiac cath post diuresis. Recent admission - for HFrEF Exacerbation s/p HF evaluation. Home med Torsemide 10mg QD and supposed to be on aldactone 25mg QD, Toprol 50mg QD, Dapagliflozin 10mg QD, Entresto 49/51mg BID  - HTN. Home meds Toprol 50mg QD, Entresto 49/51mg BID, Torsemide 10mg QD, aldactone 25mg QD  - DM II. Last Hba1c 7.7 11/10. Home meds metformin 1g BID, Glipizide 10mg BID, Dapagliflozin 10mg   She presented to the ED on  following an episode of desaturation at clinic.   History goes back to this afternoon when the patient's oxygen ran off.  This was followed 5 minutes later by an episode of desaturation down to 77%.  Patient had shortness of breath along with light headedness during the episode.  She otherwise denied chest pain, palpitations, diaphoresis, or nausea.  Over the last few weeks PTP, patient has noted worsening shortness of breath at rest and on exertion.  She has also noted LE swelling, weight gain, orthopnea, and PNDs.      Patient well known to heart failure team. Recent hospitalization, but she states she was unable to obtain the medications she was discharged on from her pharmacy, therefore was non compliant with heart failure medications. + SOB, orthopnea, LE edema.          PAST MEDICAL & SURGICAL HISTORY:  Hypertension  Hyperlipidemia  Anxiety and depression  COPD, severe  CHF (congestive heart failure)  Cerebrovascular accident (CVA)Multiple  Type 2 diabetes mellitus  CKD (chronic kidney disease), stage II  No significant past surgical history        FAMILY HISTORY:  No pertinent family history of premature cardiovascular disease in first degree relatives.  Mother:  of cancer at 65  Father:  of an overdose at 50    SOCIAL HISTORY:    Current every day smoker, 2 cigarettes a day, denies alcohol or drug use    Allergies  No Known Allergies    Intolerances    PHYSICAL EXAM:  General Appearance: well appearing, normal for age and gender. 	  Neck: unable to assess due to body habitus JVP, no bruit.   Cardiovascular: regular rate and rhythm S1 S2, , No murmurs, Generalized edema  Respiratory: B/L expiratory wheezing  Psychiatry: Alert and oriented x 3, Mood & affect appropriate  Gastrointestinal:  Soft, Non-tender  Skin/Integumentary : No rashes, No ecchymoses, No cyanosis	  Neurologic: Non-focal  Musculoskeletal/ extremities: Normal range of motion, No clubbing, cyanosis   Vascular: Peripheral pulses palpable 2+ bilaterally    CARDIAC MARKERS:  Serum Pro-Brain Natriuretic Peptide: 5736 pg/mL (. @ 15:10)    Serum Pro-Brain Natriuretic Peptide: 7542 pg/mL (. @ 11:27)      TELEMETRY EVENTS: 	    EC21      Ventricular Rate 48 BPM  Atrial Rate 48 BPM  P-R Interval 142 ms  QRS Duration 90 ms  Q-T Interval 448 ms  QTC Calculation(Bazett) 400 ms  P Axis 22 degrees  R Axis 37 degrees  T Axis -13 degrees    Diagnosis Line Sinus bradycardia  Inferior infarct , age undetermined  Abnormal ECG    Confirmed by Elenita Combs MD ([id number]) on 2021 6:09:18 PM      PREVIOUS DIAGNOSTIC TESTING:      TTE 21    Summary:   1. Left ventricular ejection fraction, by visual estimation, is 35 to 40%.   2.Moderately decreased global left ventricular systolic function.   3. Multiple left ventricular regional wall motion abnormalities exist. See wall motion findings.   4. Elevated left atrial and left ventricular end-diastolic pressures.   5. Mild concentric left ventricular hypertrophy.   6. Mildly increased LV wall thickness.   7. Normal left ventricular internal cavity size.   8. Spectral Doppler shows restrictive pattern of left ventricular myocardial filling (Grade III diastolic dysfunction).  9. Moderately reduced RV systolic function.  10. Mildly enlarged left atrium.  11. Moderately enlarged right atrium.  12. Small pericardial effusion.  13. Mild to moderate mitral valve regurgitation.  14. Moderate-severe tricuspid regurgitation.  15.Mild aortic regurgitation.  16. Sclerotic aortic valve with normal opening.  17. Estimated pulmonary artery systolic pressure is 50.0 mmHg assuming a right atrial pressure of 15 mmHg, which is consistent with moderate pulmonary hypertension.  18. Pulmonary hypertension is present.  19. LA volume Index is 36.7 ml/m² ml/m2.    	    Home Medications:  Albuterol (Eqv-ProAir HFA) 90 mcg/inh inhalation aerosol: 2 puff(s) inhaled every 6 hours, As Needed (2021 11:22)  aspirin 81 mg oral tablet: 1 tab(s) orally once a day (2021 11:22)  fenofibrate 145 mg oral tablet: 1 tab(s) orally once a day (2021 11:22)  glipiZIDE 10 mg oral tablet: 1 tab(s) orally 2 times a day (2021 11:22)  Lovaza 1000 mg oral capsule: 2 cap(s) orally 2 times a day (2021 14:52)  metFORMIN 1000 mg oral tablet: 1 tab(s) orally 2 times a day Do not take until  (2021 12:58)  Plavix 75 mg oral tablet: 1 tab(s) orally once a day (2021 11:22)  Vitamin D2 1.25 mg (50,000 intl units) oral capsule: 1 cap(s) orally once a week (2021 14:52)    MEDICATIONS  (STANDING):  aspirin  chewable 81 milliGRAM(s) Oral daily  atorvastatin 80 milliGRAM(s) Oral at bedtime  budesonide 160 MICROgram(s)/formoterol 4.5 MICROgram(s) Inhaler 2 Puff(s) Inhalation two times a day  chlorhexidine 4% Liquid 1 Application(s) Topical <User Schedule>  clopidogrel Tablet 75 milliGRAM(s) Oral daily  enoxaparin Injectable 40 milliGRAM(s) SubCutaneous daily  fenofibrate Tablet 145 milliGRAM(s) Oral daily  furosemide   Injectable 40 milliGRAM(s) IV Push two times a day  magnesium sulfate  IVPB 2 Gram(s) IV Intermittent once  metoprolol succinate ER 50 milliGRAM(s) Oral daily  omega-3-Acid Ethyl Esters 2 Gram(s) Oral two times a day  pantoprazole    Tablet 40 milliGRAM(s) Oral before breakfast  sacubitril 49 mG/valsartan 51 mG 1 Tablet(s) Oral two times a day    MEDICATIONS  (PRN):  ALBUTerol    90 MICROgram(s) HFA Inhaler 2 Puff(s) Inhalation every 6 hours PRN Shortness of Breath and/or Wheezing

## 2021-12-03 LAB
ALBUMIN SERPL ELPH-MCNC: 3.9 G/DL — SIGNIFICANT CHANGE UP (ref 3.5–5.2)
ALP SERPL-CCNC: 99 U/L — SIGNIFICANT CHANGE UP (ref 30–115)
ALT FLD-CCNC: 11 U/L — SIGNIFICANT CHANGE UP (ref 0–41)
ANION GAP SERPL CALC-SCNC: 18 MMOL/L — HIGH (ref 7–14)
ANION GAP SERPL CALC-SCNC: 23 MMOL/L — HIGH (ref 7–14)
AST SERPL-CCNC: 16 U/L — SIGNIFICANT CHANGE UP (ref 0–41)
BASOPHILS # BLD AUTO: 0.07 K/UL — SIGNIFICANT CHANGE UP (ref 0–0.2)
BASOPHILS NFR BLD AUTO: 1.3 % — HIGH (ref 0–1)
BILIRUB SERPL-MCNC: 1.2 MG/DL — SIGNIFICANT CHANGE UP (ref 0.2–1.2)
BUN SERPL-MCNC: 20 MG/DL — SIGNIFICANT CHANGE UP (ref 10–20)
BUN SERPL-MCNC: 23 MG/DL — HIGH (ref 10–20)
CALCIUM SERPL-MCNC: 9.1 MG/DL — SIGNIFICANT CHANGE UP (ref 8.5–10.1)
CALCIUM SERPL-MCNC: 9.3 MG/DL — SIGNIFICANT CHANGE UP (ref 8.5–10.1)
CHLORIDE SERPL-SCNC: 89 MMOL/L — LOW (ref 98–110)
CHLORIDE SERPL-SCNC: 92 MMOL/L — LOW (ref 98–110)
CO2 SERPL-SCNC: 25 MMOL/L — SIGNIFICANT CHANGE UP (ref 17–32)
CO2 SERPL-SCNC: 26 MMOL/L — SIGNIFICANT CHANGE UP (ref 17–32)
CREAT SERPL-MCNC: 0.9 MG/DL — SIGNIFICANT CHANGE UP (ref 0.7–1.5)
CREAT SERPL-MCNC: 1 MG/DL — SIGNIFICANT CHANGE UP (ref 0.7–1.5)
EOSINOPHIL # BLD AUTO: 0.19 K/UL — SIGNIFICANT CHANGE UP (ref 0–0.7)
EOSINOPHIL NFR BLD AUTO: 3.6 % — SIGNIFICANT CHANGE UP (ref 0–8)
GLUCOSE BLDC GLUCOMTR-MCNC: 119 MG/DL — HIGH (ref 70–99)
GLUCOSE BLDC GLUCOMTR-MCNC: 131 MG/DL — HIGH (ref 70–99)
GLUCOSE BLDC GLUCOMTR-MCNC: 139 MG/DL — HIGH (ref 70–99)
GLUCOSE BLDC GLUCOMTR-MCNC: 177 MG/DL — HIGH (ref 70–99)
GLUCOSE SERPL-MCNC: 101 MG/DL — HIGH (ref 70–99)
GLUCOSE SERPL-MCNC: 124 MG/DL — HIGH (ref 70–99)
HCT VFR BLD CALC: 48.7 % — HIGH (ref 37–47)
HGB BLD-MCNC: 15.4 G/DL — SIGNIFICANT CHANGE UP (ref 12–16)
IMM GRANULOCYTES NFR BLD AUTO: 0.4 % — HIGH (ref 0.1–0.3)
LYMPHOCYTES # BLD AUTO: 1.55 K/UL — SIGNIFICANT CHANGE UP (ref 1.2–3.4)
LYMPHOCYTES # BLD AUTO: 29.3 % — SIGNIFICANT CHANGE UP (ref 20.5–51.1)
MAGNESIUM SERPL-MCNC: 1.4 MG/DL — LOW (ref 1.8–2.4)
MAGNESIUM SERPL-MCNC: 1.7 MG/DL — LOW (ref 1.8–2.4)
MCHC RBC-ENTMCNC: 28.2 PG — SIGNIFICANT CHANGE UP (ref 27–31)
MCHC RBC-ENTMCNC: 31.6 G/DL — LOW (ref 32–37)
MCV RBC AUTO: 89 FL — SIGNIFICANT CHANGE UP (ref 81–99)
MONOCYTES # BLD AUTO: 0.45 K/UL — SIGNIFICANT CHANGE UP (ref 0.1–0.6)
MONOCYTES NFR BLD AUTO: 8.5 % — SIGNIFICANT CHANGE UP (ref 1.7–9.3)
NEUTROPHILS # BLD AUTO: 3.01 K/UL — SIGNIFICANT CHANGE UP (ref 1.4–6.5)
NEUTROPHILS NFR BLD AUTO: 56.9 % — SIGNIFICANT CHANGE UP (ref 42.2–75.2)
NRBC # BLD: 0 /100 WBCS — SIGNIFICANT CHANGE UP (ref 0–0)
PLATELET # BLD AUTO: 309 K/UL — SIGNIFICANT CHANGE UP (ref 130–400)
POTASSIUM SERPL-MCNC: 4.4 MMOL/L — SIGNIFICANT CHANGE UP (ref 3.5–5)
POTASSIUM SERPL-MCNC: 4.8 MMOL/L — SIGNIFICANT CHANGE UP (ref 3.5–5)
POTASSIUM SERPL-SCNC: 4.4 MMOL/L — SIGNIFICANT CHANGE UP (ref 3.5–5)
POTASSIUM SERPL-SCNC: 4.8 MMOL/L — SIGNIFICANT CHANGE UP (ref 3.5–5)
PROT SERPL-MCNC: 7.2 G/DL — SIGNIFICANT CHANGE UP (ref 6–8)
RBC # BLD: 5.47 M/UL — HIGH (ref 4.2–5.4)
RBC # FLD: 17.2 % — HIGH (ref 11.5–14.5)
SODIUM SERPL-SCNC: 133 MMOL/L — LOW (ref 135–146)
SODIUM SERPL-SCNC: 140 MMOL/L — SIGNIFICANT CHANGE UP (ref 135–146)
WBC # BLD: 5.29 K/UL — SIGNIFICANT CHANGE UP (ref 4.8–10.8)
WBC # FLD AUTO: 5.29 K/UL — SIGNIFICANT CHANGE UP (ref 4.8–10.8)

## 2021-12-03 PROCEDURE — 99233 SBSQ HOSP IP/OBS HIGH 50: CPT

## 2021-12-03 RX ORDER — MAGNESIUM SULFATE 500 MG/ML
2 VIAL (ML) INJECTION
Refills: 0 | Status: DISCONTINUED | OUTPATIENT
Start: 2021-12-03 | End: 2021-12-03

## 2021-12-03 RX ORDER — MAGNESIUM SULFATE 500 MG/ML
2 VIAL (ML) INJECTION ONCE
Refills: 0 | Status: COMPLETED | OUTPATIENT
Start: 2021-12-03 | End: 2021-12-03

## 2021-12-03 RX ORDER — MAGNESIUM OXIDE 400 MG ORAL TABLET 241.3 MG
400 TABLET ORAL EVERY 4 HOURS
Refills: 0 | Status: COMPLETED | OUTPATIENT
Start: 2021-12-03 | End: 2021-12-03

## 2021-12-03 RX ADMIN — SODIUM CHLORIDE 50 MILLILITER(S): 5 INJECTION, SOLUTION INTRAVENOUS at 18:01

## 2021-12-03 RX ADMIN — MAGNESIUM OXIDE 400 MG ORAL TABLET 400 MILLIGRAM(S): 241.3 TABLET ORAL at 17:31

## 2021-12-03 RX ADMIN — SPIRONOLACTONE 25 MILLIGRAM(S): 25 TABLET, FILM COATED ORAL at 05:04

## 2021-12-03 RX ADMIN — MAGNESIUM OXIDE 400 MG ORAL TABLET 400 MILLIGRAM(S): 241.3 TABLET ORAL at 21:36

## 2021-12-03 RX ADMIN — SACUBITRIL AND VALSARTAN 1 TABLET(S): 24; 26 TABLET, FILM COATED ORAL at 05:04

## 2021-12-03 RX ADMIN — CLOPIDOGREL BISULFATE 75 MILLIGRAM(S): 75 TABLET, FILM COATED ORAL at 12:14

## 2021-12-03 RX ADMIN — Medication 81 MILLIGRAM(S): at 12:13

## 2021-12-03 RX ADMIN — SODIUM CHLORIDE 50 MILLILITER(S): 5 INJECTION, SOLUTION INTRAVENOUS at 06:01

## 2021-12-03 RX ADMIN — CHLORHEXIDINE GLUCONATE 1 APPLICATION(S): 213 SOLUTION TOPICAL at 05:05

## 2021-12-03 RX ADMIN — Medication 145 MILLIGRAM(S): at 12:14

## 2021-12-03 RX ADMIN — ENOXAPARIN SODIUM 40 MILLIGRAM(S): 100 INJECTION SUBCUTANEOUS at 12:14

## 2021-12-03 RX ADMIN — BUMETANIDE 5 MG/HR: 0.25 INJECTION INTRAMUSCULAR; INTRAVENOUS at 05:05

## 2021-12-03 RX ADMIN — SACUBITRIL AND VALSARTAN 1 TABLET(S): 24; 26 TABLET, FILM COATED ORAL at 17:31

## 2021-12-03 RX ADMIN — BUDESONIDE AND FORMOTEROL FUMARATE DIHYDRATE 2 PUFF(S): 160; 4.5 AEROSOL RESPIRATORY (INHALATION) at 08:13

## 2021-12-03 RX ADMIN — PANTOPRAZOLE SODIUM 40 MILLIGRAM(S): 20 TABLET, DELAYED RELEASE ORAL at 05:04

## 2021-12-03 RX ADMIN — ATORVASTATIN CALCIUM 80 MILLIGRAM(S): 80 TABLET, FILM COATED ORAL at 21:36

## 2021-12-03 RX ADMIN — Medication 50 MILLIGRAM(S): at 05:04

## 2021-12-03 RX ADMIN — QUETIAPINE FUMARATE 25 MILLIGRAM(S): 200 TABLET, FILM COATED ORAL at 21:36

## 2021-12-03 RX ADMIN — Medication 50 GRAM(S): at 22:04

## 2021-12-03 NOTE — PROGRESS NOTE ADULT - SUBJECTIVE AND OBJECTIVE BOX
CHIEF COMPLAINT:    Patient is a 56y old  Female who presents with a chief complaint of Acute on Chronic CHF     INTERVAL HPI/OVERNIGHT EVENTS:    Patient seen and examined at bedside. No acute overnight events occurred.    ROS: Denies sOB, chest pain. All other systems are negative.    Vital Signs:    T(F): 97.4 (12-03-21 @ 12:31), Max: 97.6 (12-03-21 @ 04:50)  HR: 76 (12-03-21 @ 12:31) (69 - 76)  BP: 126/81 (12-03-21 @ 12:31) (104/62 - 126/81)  RR: 18 (12-03-21 @ 12:31) (18 - 20)  SpO2: 98% (12-03-21 @ 08:57) (98% - 98%)  I&O's Summary    02 Dec 2021 07:01  -  03 Dec 2021 07:00  --------------------------------------------------------  IN: 1740 mL / OUT: 4500 mL / NET: -2760 mL    03 Dec 2021 07:01  -  03 Dec 2021 15:43  --------------------------------------------------------  IN: 1530 mL / OUT: 1600 mL / NET: -70 mL      Daily     Daily   CAPILLARY BLOOD GLUCOSE      POCT Blood Glucose.: 131 mg/dL (03 Dec 2021 11:26)  POCT Blood Glucose.: 119 mg/dL (03 Dec 2021 07:25)  POCT Blood Glucose.: 126 mg/dL (02 Dec 2021 21:44)  POCT Blood Glucose.: 163 mg/dL (02 Dec 2021 16:36)      PHYSICAL EXAM:  GENERAL:  NAD  SKIN: No rashes or lesions  HEENT: Atraumatic. Normocephalic. Anicteric  NECK:  No JVD.   PULMONARY: Clear to ausculation bilaterally. No wheezing. No rales  CVS: Normal S1, S2. Regular rate and rhythm. No murmurs.  ABDOMEN/GI: Soft, Nontender, Nondistended; Bowel sounds are present  EXTREMITIES:  No edema B/L LE.  NEUROLOGIC:  No motor deficit.  PSYCH: Alert & oriented x 3, normal affect      LABS:                        15.4   5.29  )-----------( 309      ( 03 Dec 2021 07:40 )             48.7     12-03    140  |  92<L>  |  20  ----------------------------<  101<H>  4.4   |  25  |  0.9    Ca    9.3      03 Dec 2021 07:40  Mg     1.7     12-03    TPro  7.2  /  Alb  3.9  /  TBili  1.2  /  DBili  x   /  AST  16  /  ALT  11  /  AlkPhos  99  12-03      Serum Pro-Brain Natriuretic Peptide: 5736 pg/mL (11-29-21 @ 15:10)      RADIOLOGY & ADDITIONAL TESTS:  Imaging or report Personally Reviewed:  [ ] YES  [ ] NO    Telemetry reviewed independently - no events    Medications:  Standing  aspirin enteric coated 81 milliGRAM(s) Oral daily  atorvastatin 80 milliGRAM(s) Oral at bedtime  budesonide 160 MICROgram(s)/formoterol 4.5 MICROgram(s) Inhaler 2 Puff(s) Inhalation two times a day  buMETAnide Infusion 1 mG/Hr IV Continuous <Continuous>  chlorhexidine 4% Liquid 1 Application(s) Topical <User Schedule>  clopidogrel Tablet 75 milliGRAM(s) Oral daily  dextrose 40% Gel 15 Gram(s) Oral once  dextrose 5%. 1000 milliLiter(s) IV Continuous <Continuous>  dextrose 5%. 1000 milliLiter(s) IV Continuous <Continuous>  dextrose 50% Injectable 25 Gram(s) IV Push once  dextrose 50% Injectable 12.5 Gram(s) IV Push once  dextrose 50% Injectable 25 Gram(s) IV Push once  enoxaparin Injectable 40 milliGRAM(s) SubCutaneous daily  fenofibrate Tablet 145 milliGRAM(s) Oral daily  glucagon  Injectable 1 milliGRAM(s) IntraMuscular once  influenza   Vaccine 0.5 milliLiter(s) IntraMuscular once  insulin lispro (ADMELOG) corrective regimen sliding scale   SubCutaneous three times a day before meals  magnesium sulfate  IVPB 2 Gram(s) IV Intermittent every 2 hours  metoprolol succinate ER 50 milliGRAM(s) Oral daily  pantoprazole    Tablet 40 milliGRAM(s) Oral before breakfast  QUEtiapine 25 milliGRAM(s) Oral at bedtime  sacubitril 49 mG/valsartan 51 mG 1 Tablet(s) Oral two times a day  sodium chloride 3%. 150 milliLiter(s) IV Continuous <Continuous>  sodium chloride 3%. 150 milliLiter(s) IV Continuous <Continuous>  sodium chloride 3%. 150 milliLiter(s) IV Continuous <Continuous>  sodium chloride 3%. 150 milliLiter(s) IV Continuous <Continuous>  sodium chloride 3%. 150 milliLiter(s) IV Continuous <Continuous>  spironolactone 25 milliGRAM(s) Oral daily    PRN Meds  ALBUTerol    90 MICROgram(s) HFA Inhaler 2 Puff(s) Inhalation every 6 hours PRN      Case discussed with resident  Care discussed with pt

## 2021-12-03 NOTE — CDI QUERY NOTE - NSCDIOTHERTXTBX_GEN_ALL_CORE_HH
CLINICAL INDICATORS      ED Provider Notes:    - “· Respiratory Distress: no. Breath Sounds: DIMINISHED. Diminished Breath  Sounds: DIFFUSE”    H&P:    - “57 y/o woman with a past medical history of COPD on 4-5L O2, HFwREF, recent  NSTEMI, HTN, HLD, DM, CAD s/p stenting in 6/2021, CVA with L sided weakness  admitted for acute on chronic hypoxic respiratory failure”   - “She was seen at her PMDs office today and sent in for hypoxia and  respiratory distress. She ran out of portable O2 just prior to being seen in the  office”   - “...episode of desaturation down to 77%.”    - A/P:  “Acute on chronic hypercapnia respiratory failure”     12/1, Hospitalist Progress Notes:    - “COPD and chronic hypoxic RF. On home O2 4LPM. Home med Albuterol 2 puffs Q6h  PRN, Symbicort 2 puffs BID”    Vitals:    - ED:  RR 22; 99% on 2L    - On admission:  RR 18; 95% on 4L...RR 18; 96% on 4L     Labs:    - 11/29, VBG:  pH 7.36; pCO2 47; pO2 38; HCO3 27     Medications:    - (11/30, 12/1) Symbicort 160 mcg inhaler (2 puffs) BID        Based on your professional judgment and the clinical indicators, please clarify  the patient’s acute respiratory status:    * Acute respiratory failure ruled out with further consideration    * Acute respiratory failure ruled in as evidenced by (please also specify  whether hypoxic/hypercapnic):  * Other (please specify):    Thank you,  Adriana Batista RN, [facility]  [phone]

## 2021-12-03 NOTE — PROGRESS NOTE ADULT - ASSESSMENT
55 yo F PMHx COPD on home O2, HFrEF, HTN, DLD, CVA with residual left LE weakness wheechair bound, and DM II who presented with progressive dyspnea and hypoxia due to  acute on chronic HFrEF decompensation. Pt did not receive her diuretics at home which likely precipitated exacerbation     Acute on chronic CHF exacerbation/recurrent readmission for CHF  -TTE 11/14 EF 15-20%, DDII, WMA, mod MR, mild-mod TR, severe PHTN, small pericardial effusion  -c/w bumex ggt/hypertonic saline  -strict I and O chart  - trop 0.05 l;ikely due to cardiac stretch from CHF exacerbation   -daily weight, fluid restriction  -continue toprol XL, entresto, spironolactone- monitor b/p  - pending HF team follow up    Acute on chronic hypoxia  - h/o COPD  - likely due to volume overload  - now on 4 L sat 96%- will taper down goal of pulse ox 89-92%  -nebs/inhalers tx.     HTN  - continue  toprol, spironolactone    CVA/Dyslipidemia-   stable - continue atrovatatin, aspirin    DM 2   - controlled  - c/w insulin    dVT proph- lovenox subc. tx.  code status: full code    #Progress Note Handoff:  Pending (specify): euvolemia  Family discussion: discussed continued diuresis  Disposition: Home___/SNF___/Other________/Unknown at this time________

## 2021-12-03 NOTE — PROGRESS NOTE ADULT - ASSESSMENT
56 year old female patient with multiple comorbidities including COPD on home O2, HFrEF, HTN, DL/CVA with residual left LE weakness, and DMII who presented on 11/29 for evaluation of an episode of desaturation after her O2 ran off at her PCP's clinic, found to have elevated BNP on labs and evidence of congestion on imaging, admitted for management of acute on chronic HFrEF decompensation. Currently hemodynamically stable.      #Acute on Chronic HFrEF Decompensation  -TTE 11/14: EF 15-20%, DDII, WMA, mod MR, mild-mod TR, severe PHTN, small pericardial effusion.  -Recent admission 11/08-11/17 for HFrEF Exacerbation s/p HF evaluation.   -Last evaluated by Dr Buitrago on 11/15 and plan was to perform cardiac cath post diuresis.   -Home med Torsemide 10mg QD and supposed to be on aldactone 25mg QD, Toprol 50mg QD, Dapagliflozin 10mg QD, Entresto 49/51mg BID---> pt never received meds in pharmacy  -ED Labs Pro BNP 5736 (was 7542 11/08), TSH 11/30 wnl  -CXR with evidence of congestion  -Monitor SAO2 and O2 requirements: currently home requirements 4L  -c/w Entresto 49/51mg BID  -c/w Atorvastatin 80 mg QD and Fenofibrate 145mg QD  -c/w Aspirin 81mg QD and Plavix 75mg QD per Dr Buitrago's recommendation (note from 11/15)  -restarted home spiranolactone , keep K>4 and Mg>2  -HF eval noted 12/2 :   ·	Continue Bumex gtt at 1 mg/hr   ·	Continue 3% Hypertonic Saline 150ml BID  ·	Continue Entresto 49-51 mg BID   ·	Continue metoprolol XL 50 mg daily   ·	Spironolactone 25 mg daily -Get BMP twice daily   -Monitor in/out  -Monitor daily weight  -Net - balance of 2.7      #Elevated Bilirubin  -f/u HFP  - no evidence of hemolysis       #COPD and chronic hypoxic RF  * On home O2 4LPM  * Home med Albuterol 2 puffs Q6h PRN, Symbicort 2 puffs BID  - Monitor SAO2 and O2 requirements: currently home requirements  - Duonebs PRN  - Resume Symbicort 2 puffs BID      #Dyslipidemia  #History of CVA with residual Left LE weakness  * Uses wheelchair at baseline  * Lipid Profile (11.10.21 @ 18:31) Cholesterol, Serum: 89 mg/dL; Triglycerides, Serum: 174 mg/dL; HDL Cholesterol, Serum: 24 mg/dL.  * Home meds Atorvastatin 80 mg QD, Aspirin 81mg QD, Plavix 75mg QD, Fenofibrate 145mg QD  -c/w Atorvastatin 80 mg QD and Fenofibrate 145mg QD  -c/w Aspirin 81mg QD and Plavix 75mg QD per Dr Buitrago's recommendation (note from 11/15)  - Duplex 11/20: No evidence of deep venous thrombosis or superficial thrombophlebitis in the bilateral lower extremities.  Incidentally noted is a occluded left superficial femoral artery with reconstitution of the popliteal artery, clinical correlation is recommended.        #Depression  -Home med Seroquel 25 QD   -Resume Seroquel 25 QD      #HTN  * Home meds Toprol 50mg QD, Entresto 49/51mg BID, Torsemide 10mg QD, aldactone 25mg QD  * ED /99mmHg  -Monitor BP closely  -c/w Entresto 49/51mg BID  -diuretics as above      #DM II  * Last Hba1c 7.7 11/10  * Home meds metformin 1g BID, Glipizide 10mg BID, Dapagliflozin 10mg   - Monitor POCT premeals and at bedtime  - c/w sliding Scale for now        - DVT Prophylaxis: Lovenox 40mg Subcutaneously daily  - GI Prophylaxis: Pantoprazole 40mg PO QD  - Diet: DASH/ TLC  - Code Status: Full  - Handoff f/u: 4 pm BMP keep K>4, Mg>2.1

## 2021-12-04 LAB
ALBUMIN SERPL ELPH-MCNC: 4 G/DL — SIGNIFICANT CHANGE UP (ref 3.5–5.2)
ALP SERPL-CCNC: 102 U/L — SIGNIFICANT CHANGE UP (ref 30–115)
ALT FLD-CCNC: 12 U/L — SIGNIFICANT CHANGE UP (ref 0–41)
ANION GAP SERPL CALC-SCNC: 18 MMOL/L — HIGH (ref 7–14)
ANION GAP SERPL CALC-SCNC: 21 MMOL/L — HIGH (ref 7–14)
AST SERPL-CCNC: 18 U/L — SIGNIFICANT CHANGE UP (ref 0–41)
BASOPHILS # BLD AUTO: 0.09 K/UL — SIGNIFICANT CHANGE UP (ref 0–0.2)
BASOPHILS NFR BLD AUTO: 1.6 % — HIGH (ref 0–1)
BILIRUB SERPL-MCNC: 1 MG/DL — SIGNIFICANT CHANGE UP (ref 0.2–1.2)
BUN SERPL-MCNC: 25 MG/DL — HIGH (ref 10–20)
BUN SERPL-MCNC: 26 MG/DL — HIGH (ref 10–20)
CALCIUM SERPL-MCNC: 9.4 MG/DL — SIGNIFICANT CHANGE UP (ref 8.5–10.1)
CALCIUM SERPL-MCNC: 9.4 MG/DL — SIGNIFICANT CHANGE UP (ref 8.5–10.1)
CHLORIDE SERPL-SCNC: 91 MMOL/L — LOW (ref 98–110)
CHLORIDE SERPL-SCNC: 93 MMOL/L — LOW (ref 98–110)
CO2 SERPL-SCNC: 24 MMOL/L — SIGNIFICANT CHANGE UP (ref 17–32)
CO2 SERPL-SCNC: 25 MMOL/L — SIGNIFICANT CHANGE UP (ref 17–32)
CREAT SERPL-MCNC: 0.9 MG/DL — SIGNIFICANT CHANGE UP (ref 0.7–1.5)
CREAT SERPL-MCNC: 1.2 MG/DL — SIGNIFICANT CHANGE UP (ref 0.7–1.5)
EOSINOPHIL # BLD AUTO: 0.21 K/UL — SIGNIFICANT CHANGE UP (ref 0–0.7)
EOSINOPHIL NFR BLD AUTO: 3.8 % — SIGNIFICANT CHANGE UP (ref 0–8)
GLUCOSE BLDC GLUCOMTR-MCNC: 120 MG/DL — HIGH (ref 70–99)
GLUCOSE BLDC GLUCOMTR-MCNC: 135 MG/DL — HIGH (ref 70–99)
GLUCOSE BLDC GLUCOMTR-MCNC: 146 MG/DL — HIGH (ref 70–99)
GLUCOSE BLDC GLUCOMTR-MCNC: 164 MG/DL — HIGH (ref 70–99)
GLUCOSE SERPL-MCNC: 132 MG/DL — HIGH (ref 70–99)
GLUCOSE SERPL-MCNC: 152 MG/DL — HIGH (ref 70–99)
HCT VFR BLD CALC: 49.8 % — HIGH (ref 37–47)
HGB BLD-MCNC: 15.8 G/DL — SIGNIFICANT CHANGE UP (ref 12–16)
IMM GRANULOCYTES NFR BLD AUTO: 0.4 % — HIGH (ref 0.1–0.3)
LYMPHOCYTES # BLD AUTO: 1.56 K/UL — SIGNIFICANT CHANGE UP (ref 1.2–3.4)
LYMPHOCYTES # BLD AUTO: 28.3 % — SIGNIFICANT CHANGE UP (ref 20.5–51.1)
MAGNESIUM SERPL-MCNC: 1.3 MG/DL — LOW (ref 1.8–2.4)
MCHC RBC-ENTMCNC: 27.5 PG — SIGNIFICANT CHANGE UP (ref 27–31)
MCHC RBC-ENTMCNC: 31.7 G/DL — LOW (ref 32–37)
MCV RBC AUTO: 86.6 FL — SIGNIFICANT CHANGE UP (ref 81–99)
MONOCYTES # BLD AUTO: 0.37 K/UL — SIGNIFICANT CHANGE UP (ref 0.1–0.6)
MONOCYTES NFR BLD AUTO: 6.7 % — SIGNIFICANT CHANGE UP (ref 1.7–9.3)
NEUTROPHILS # BLD AUTO: 3.27 K/UL — SIGNIFICANT CHANGE UP (ref 1.4–6.5)
NEUTROPHILS NFR BLD AUTO: 59.2 % — SIGNIFICANT CHANGE UP (ref 42.2–75.2)
NRBC # BLD: 0 /100 WBCS — SIGNIFICANT CHANGE UP (ref 0–0)
PLATELET # BLD AUTO: 329 K/UL — SIGNIFICANT CHANGE UP (ref 130–400)
POTASSIUM SERPL-MCNC: 3.8 MMOL/L — SIGNIFICANT CHANGE UP (ref 3.5–5)
POTASSIUM SERPL-MCNC: 3.9 MMOL/L — SIGNIFICANT CHANGE UP (ref 3.5–5)
POTASSIUM SERPL-SCNC: 3.8 MMOL/L — SIGNIFICANT CHANGE UP (ref 3.5–5)
POTASSIUM SERPL-SCNC: 3.9 MMOL/L — SIGNIFICANT CHANGE UP (ref 3.5–5)
PROT SERPL-MCNC: 7.5 G/DL — SIGNIFICANT CHANGE UP (ref 6–8)
RBC # BLD: 5.75 M/UL — HIGH (ref 4.2–5.4)
RBC # FLD: 16.8 % — HIGH (ref 11.5–14.5)
SODIUM SERPL-SCNC: 135 MMOL/L — SIGNIFICANT CHANGE UP (ref 135–146)
SODIUM SERPL-SCNC: 137 MMOL/L — SIGNIFICANT CHANGE UP (ref 135–146)
WBC # BLD: 5.52 K/UL — SIGNIFICANT CHANGE UP (ref 4.8–10.8)
WBC # FLD AUTO: 5.52 K/UL — SIGNIFICANT CHANGE UP (ref 4.8–10.8)

## 2021-12-04 PROCEDURE — 99232 SBSQ HOSP IP/OBS MODERATE 35: CPT

## 2021-12-04 RX ORDER — MAGNESIUM OXIDE 400 MG ORAL TABLET 241.3 MG
400 TABLET ORAL EVERY 4 HOURS
Refills: 0 | Status: COMPLETED | OUTPATIENT
Start: 2021-12-04 | End: 2021-12-04

## 2021-12-04 RX ORDER — SPIRONOLACTONE 25 MG/1
1 TABLET, FILM COATED ORAL
Qty: 30 | Refills: 3
Start: 2021-12-04 | End: 2022-04-02

## 2021-12-04 RX ORDER — SACUBITRIL AND VALSARTAN 24; 26 MG/1; MG/1
1 TABLET, FILM COATED ORAL
Qty: 60 | Refills: 0
Start: 2021-12-04 | End: 2022-01-02

## 2021-12-04 RX ORDER — SPIRONOLACTONE 25 MG/1
1 TABLET, FILM COATED ORAL
Qty: 30 | Refills: 0
Start: 2021-12-04 | End: 2022-01-02

## 2021-12-04 RX ORDER — DAPAGLIFLOZIN 10 MG/1
1 TABLET, FILM COATED ORAL
Qty: 30 | Refills: 0
Start: 2021-12-04 | End: 2022-01-02

## 2021-12-04 RX ORDER — MAGNESIUM SULFATE 500 MG/ML
2 VIAL (ML) INJECTION
Refills: 0 | Status: DISCONTINUED | OUTPATIENT
Start: 2021-12-04 | End: 2021-12-04

## 2021-12-04 RX ADMIN — SODIUM CHLORIDE 50 MILLILITER(S): 5 INJECTION, SOLUTION INTRAVENOUS at 06:41

## 2021-12-04 RX ADMIN — CLOPIDOGREL BISULFATE 75 MILLIGRAM(S): 75 TABLET, FILM COATED ORAL at 12:01

## 2021-12-04 RX ADMIN — Medication 2: at 12:03

## 2021-12-04 RX ADMIN — Medication 50 MILLIGRAM(S): at 05:44

## 2021-12-04 RX ADMIN — BUDESONIDE AND FORMOTEROL FUMARATE DIHYDRATE 2 PUFF(S): 160; 4.5 AEROSOL RESPIRATORY (INHALATION) at 08:08

## 2021-12-04 RX ADMIN — CHLORHEXIDINE GLUCONATE 1 APPLICATION(S): 213 SOLUTION TOPICAL at 05:39

## 2021-12-04 RX ADMIN — BUMETANIDE 5 MG/HR: 0.25 INJECTION INTRAMUSCULAR; INTRAVENOUS at 08:48

## 2021-12-04 RX ADMIN — QUETIAPINE FUMARATE 25 MILLIGRAM(S): 200 TABLET, FILM COATED ORAL at 21:27

## 2021-12-04 RX ADMIN — SPIRONOLACTONE 25 MILLIGRAM(S): 25 TABLET, FILM COATED ORAL at 05:44

## 2021-12-04 RX ADMIN — SACUBITRIL AND VALSARTAN 1 TABLET(S): 24; 26 TABLET, FILM COATED ORAL at 05:44

## 2021-12-04 RX ADMIN — ATORVASTATIN CALCIUM 80 MILLIGRAM(S): 80 TABLET, FILM COATED ORAL at 21:27

## 2021-12-04 RX ADMIN — MAGNESIUM OXIDE 400 MG ORAL TABLET 400 MILLIGRAM(S): 241.3 TABLET ORAL at 17:37

## 2021-12-04 RX ADMIN — Medication 145 MILLIGRAM(S): at 12:01

## 2021-12-04 RX ADMIN — PANTOPRAZOLE SODIUM 40 MILLIGRAM(S): 20 TABLET, DELAYED RELEASE ORAL at 06:41

## 2021-12-04 RX ADMIN — MAGNESIUM OXIDE 400 MG ORAL TABLET 400 MILLIGRAM(S): 241.3 TABLET ORAL at 13:55

## 2021-12-04 RX ADMIN — Medication 81 MILLIGRAM(S): at 12:01

## 2021-12-04 RX ADMIN — ENOXAPARIN SODIUM 40 MILLIGRAM(S): 100 INJECTION SUBCUTANEOUS at 12:06

## 2021-12-04 RX ADMIN — SACUBITRIL AND VALSARTAN 1 TABLET(S): 24; 26 TABLET, FILM COATED ORAL at 17:37

## 2021-12-04 NOTE — PROGRESS NOTE ADULT - ASSESSMENT
55 yo F PMHx COPD on home O2, HFrEF, HTN, DLD, CVA with residual left LE weakness wheechair bound, and DM II who presented with progressive dyspnea and hypoxia due to  acute on chronic HFrEF decompensation. Pt did not receive her diuretics at home which likely precipitated exacerbation     Acute on chronic CHF exacerbation/recurrent readmission for CHF  -TTE 11/14 EF 15-20%, DDII, WMA, mod MR, mild-mod TR, severe PHTN, small pericardial effusion  -dc bumex, start PO toresmide  -strict I and O chart  - trop 0.05 l;ikely due to cardiac stretch from CHF exacerbation   -daily weight, fluid restriction  -continue toprol XL, entresto, spironolactone- monitor b/p  - pending HF team follow up    Acute on chronic hypoxia  - h/o COPD  - likely due to volume overload  - now on 4 L sat 96%- will taper down goal of pulse ox 89-92%  -nebs/inhalers tx.     HTN  - continue  toprol, spironolactone    CVA/Dyslipidemia-   stable - continue atrovatatin, aspirin    DM 2   - controlled  - c/w insulin    dVT proph- lovenox subc. tx.  code status: full code    #Progress Note Handoff:  Pending (specify): euvolemia anticiapte for dc in AM  Family discussion: discussed continued diuresis  Disposition: Home___/SNF___/Other________/Unknown at this time________

## 2021-12-04 NOTE — PROGRESS NOTE ADULT - ASSESSMENT
56 year old female patient with multiple comorbidities including COPD on home O2, HFrEF, HTN, DL/CVA with residual left LE weakness, and DMII who presented on 11/29 for evaluation of an episode of desaturation after her O2 ran off at her PCP's clinic, found to have elevated BNP on labs and evidence of congestion on imaging, admitted for management of acute on chronic HFrEF decompensation. Currently hemodynamically stable.      #Acute on Chronic HFrEF Decompensation  -TTE 11/14: EF 15-20%, DDII, WMA, mod MR, mild-mod TR, severe PHTN, small pericardial effusion.  -Recent admission 11/08-11/17 for HFrEF Exacerbation s/p HF evaluation.   -Last evaluated by Dr Buitrago on 11/15 and plan was to perform cardiac cath post diuresis.   -Home med Torsemide 10mg QD and supposed to be on aldactone 25mg QD, Toprol 50mg QD, Dapagliflozin 10mg QD, Entresto 49/51mg BID---> pt never received meds in pharmacy  -ED Labs Pro BNP 5736 (was 7542 11/08), TSH 11/30 wnl  -CXR with evidence of congestion  -Monitor SAO2 and O2 requirements: currently home requirements 4L  -c/w Entresto 49/51mg BID  -c/w Atorvastatin 80 mg QD and Fenofibrate 145mg QD  -c/w Aspirin 81mg QD and Plavix 75mg QD per Dr Buitrago's recommendation (note from 11/15)  -restarted home spiranolactone , keep K>4 and Mg>2  -HF eval noted 12/2 :   Continue Bumex gtt at 1 mg/hr   Continue 3% Hypertonic Saline 150ml BID  Continue Entresto 49-51 mg BID   Continue metoprolol XL 50 mg daily   Spironolactone 25 mg daily -Get BMP twice daily   -Monitor in/out  -Monitor daily weight  -Net - balance of 1.7      #Elevated Bilirubin-resolved  - HFP wnl  - no evidence of hemolysis       #COPD and chronic hypoxic RF  * On home O2 4LPM  * Home med Albuterol 2 puffs Q6h PRN, Symbicort 2 puffs BID  - Monitor SAO2 and O2 requirements: currently home requirements  - Duonebs PRN  - Resume Symbicort 2 puffs BID      #Dyslipidemia  #History of CVA with residual Left LE weakness  * Uses wheelchair at baseline  * Lipid Profile (11.10.21 @ 18:31) Cholesterol, Serum: 89 mg/dL; Triglycerides, Serum: 174 mg/dL; HDL Cholesterol, Serum: 24 mg/dL.  * Home meds Atorvastatin 80 mg QD, Aspirin 81mg QD, Plavix 75mg QD, Fenofibrate 145mg QD  -c/w Atorvastatin 80 mg QD and Fenofibrate 145mg QD  -c/w Aspirin 81mg QD and Plavix 75mg QD per Dr Buitrago's recommendation (note from 11/15)  - Duplex 11/20: No evidence of deep venous thrombosis or superficial thrombophlebitis in the bilateral lower extremities.  Incidentally noted is a occluded left superficial femoral artery with reconstitution of the popliteal artery, clinical correlation is recommended.        #Depression  -Home med Seroquel 25 QD   -Resume Seroquel 25 QD      #HTN  * Home meds Toprol 50mg QD, Entresto 49/51mg BID, Torsemide 10mg QD, aldactone 25mg QD  * ED /99mmHg  -Monitor BP closely  -c/w Entresto 49/51mg BID  -diuretics as above      #DM II  * Last Hba1c 7.7 11/10  * Home meds metformin 1g BID, Glipizide 10mg BID, Dapagliflozin 10mg   - Monitor POCT premeals and at bedtime  - c/w sliding Scale for now        - DVT Prophylaxis: Lovenox 40mg Subcutaneously daily  - GI Prophylaxis: Pantoprazole 40mg PO QD  - Diet: DASH/ TLC  - Code Status: Full  - Handoff f/u: 4 pm BMP keep K>4, Mg>2.1  56 year old female patient with multiple comorbidities including COPD on home O2, HFrEF, HTN, DL/CVA with residual left LE weakness, and DMII who presented on 11/29 for evaluation of an episode of desaturation after her O2 ran off at her PCP's clinic, found to have elevated BNP on labs and evidence of congestion on imaging, admitted for management of acute on chronic HFrEF decompensation. Currently hemodynamically stable.      #Acute on Chronic HFrEF Decompensation  -TTE 11/14: EF 15-20%, DDII, WMA, mod MR, mild-mod TR, severe PHTN, small pericardial effusion.  -Recent admission 11/08-11/17 for HFrEF Exacerbation s/p HF evaluation.   -Last evaluated by Dr Buitrago on 11/15 and plan was to perform cardiac cath post diuresis.   -Home med Torsemide 10mg QD and supposed to be on aldactone 25mg QD, Toprol 50mg QD, Dapagliflozin 10mg QD, Entresto 49/51mg BID---> pt never received meds in pharmacy  -ED Labs Pro BNP 5736 (was 7542 11/08), TSH 11/30 wnl  -CXR with evidence of congestion  -Monitor SAO2 and O2 requirements: currently home requirements 4L  -c/w Entresto 49/51mg BID  -c/w Atorvastatin 80 mg QD and Fenofibrate 145mg QD  -c/w Aspirin 81mg QD and Plavix 75mg QD per Dr Buitrago's recommendation (note from 11/15)  -restarted home spiranolactone , keep K>4 and Mg>2  -s/p Bumex gtt at 1 mg/hr and 3% Hypertonic Saline 150ml BID  -Pt achieved Euvolemia, As per discussion with primary and HF team Bumex gtt stopped and Torsemide 20 mg daily started today 12/4  -Monitor in/out  -Monitor daily weight  -Net - balance of 1.7      #Elevated Bilirubin-resolved  - HFP wnl  - no evidence of hemolysis       #COPD and chronic hypoxic RF  * On home O2 4LPM  * Home med Albuterol 2 puffs Q6h PRN, Symbicort 2 puffs BID  - Monitor SAO2 and O2 requirements: currently home requirements  - Duonebs PRN  - Resume Symbicort 2 puffs BID      #Dyslipidemia  #History of CVA with residual Left LE weakness  * Uses wheelchair at baseline  * Lipid Profile (11.10.21 @ 18:31) Cholesterol, Serum: 89 mg/dL; Triglycerides, Serum: 174 mg/dL; HDL Cholesterol, Serum: 24 mg/dL.  * Home meds Atorvastatin 80 mg QD, Aspirin 81mg QD, Plavix 75mg QD, Fenofibrate 145mg QD  -c/w Atorvastatin 80 mg QD and Fenofibrate 145mg QD  -c/w Aspirin 81mg QD and Plavix 75mg QD per Dr Buitrago's recommendation (note from 11/15)  - Duplex 11/20: No evidence of deep venous thrombosis or superficial thrombophlebitis in the bilateral lower extremities.  Incidentally noted is a occluded left superficial femoral artery with reconstitution of the popliteal artery, clinical correlation is recommended.        #Depression  -Home med Seroquel 25 QD   -Resume Seroquel 25 QD      #HTN  * Home meds Toprol 50mg QD, Entresto 49/51mg BID, Torsemide 10mg QD, aldactone 25mg QD  * ED /99mmHg  -Monitor BP closely  -c/w Entresto 49/51mg BID  -diuretics as above      #DM II  * Last Hba1c 7.7 11/10  * Home meds metformin 1g BID, Glipizide 10mg BID, Dapagliflozin 10mg   - Monitor POCT premeals and at bedtime  - c/w sliding Scale for now        - DVT Prophylaxis: Lovenox 40mg Subcutaneously daily  - GI Prophylaxis: Pantoprazole 40mg PO QD  - Diet: DASH/ TLC  - Code Status: Full  - Handoff f/u: will continue to Monitor pt on Torsemide 20 mg, possible d/c in 24-48 hrs

## 2021-12-04 NOTE — PROGRESS NOTE ADULT - SUBJECTIVE AND OBJECTIVE BOX
CHIEF COMPLAINT:    Patient is a 56y old  Female who presents with a chief complaint of CHF exacerbation    INTERVAL HPI/OVERNIGHT EVENTS:    Patient seen and examined at bedside. No acute overnight events occurred.    ROS: SOB/orthopnea resolves All other systems are negative.    Vital Signs:    T(F): 97.9 (21 @ 14:09), Max: 98.6 (21 @ 20:10)  HR: 77 (21 @ 14:09) (71 - 78)  BP: 123/83 (21 @ 14:09) (101/68 - 191/80)  RR: 20 (21 @ 14:09) (18 - 20)  SpO2: --  I&O's Summary    03 Dec 2021 07:01  -  04 Dec 2021 07:00  --------------------------------------------------------  IN: 2250 mL / OUT: 4000 mL / NET: -1750 mL    04 Dec 2021 07:01  -  04 Dec 2021 14:57  --------------------------------------------------------  IN: 370 mL / OUT: 900 mL / NET: -530 mL      Daily     Daily Weight in k.3 (04 Dec 2021 04:00)  CAPILLARY BLOOD GLUCOSE      POCT Blood Glucose.: 164 mg/dL (04 Dec 2021 11:00)  POCT Blood Glucose.: 120 mg/dL (04 Dec 2021 07:54)  POCT Blood Glucose.: 177 mg/dL (03 Dec 2021 21:28)  POCT Blood Glucose.: 139 mg/dL (03 Dec 2021 16:54)      PHYSICAL EXAM:  GENERAL:  NAD, lying supine  SKIN: No rashes or lesions  HEENT: Atraumatic. Normocephalic. Anicteric  NECK:  No JVD.   PULMONARY: Clear to ausculation bilaterally. No wheezing. No rales  CVS: Normal S1, S2. Regular rate and rhythm. No murmurs.  ABDOMEN/GI: Soft, Nontender, Nondistended; Bowel sounds are present  EXTREMITIES:  No edema B/L LE.  NEUROLOGIC:  No motor deficit.  PSYCH: Alert & oriented x 3, normal affect    Consultant(s) Notes Reviewed:  [x ] YES  [ ] NO  Care Discussed with Consultants/Other Providers [ x] YES  [ ] NO    LABS:                        15.8   5.52  )-----------( 329      ( 04 Dec 2021 05:28 )             49.8     12-    137  |  91<L>  |  25<H>  ----------------------------<  152<H>  3.8   |  25  |  0.9    Ca    9.4      04 Dec 2021 05:28  Mg     1.3     12-    TPro  7.5  /  Alb  4.0  /  TBili  1.0  /  DBili  x   /  AST  18  /  ALT  12  /  AlkPhos  102  12      Serum Pro-Brain Natriuretic Peptide: 5736 pg/mL (21 @ 15:10)          RADIOLOGY & ADDITIONAL TESTS:  Imaging or report Personally Reviewed:  [ ] YES  [ ] NO    Telemetry reviewed independently - no events    Medications:  Standing  aspirin enteric coated 81 milliGRAM(s) Oral daily  atorvastatin 80 milliGRAM(s) Oral at bedtime  budesonide 160 MICROgram(s)/formoterol 4.5 MICROgram(s) Inhaler 2 Puff(s) Inhalation two times a day  chlorhexidine 4% Liquid 1 Application(s) Topical <User Schedule>  clopidogrel Tablet 75 milliGRAM(s) Oral daily  dextrose 40% Gel 15 Gram(s) Oral once  dextrose 5%. 1000 milliLiter(s) IV Continuous <Continuous>  dextrose 5%. 1000 milliLiter(s) IV Continuous <Continuous>  dextrose 50% Injectable 25 Gram(s) IV Push once  dextrose 50% Injectable 12.5 Gram(s) IV Push once  dextrose 50% Injectable 25 Gram(s) IV Push once  enoxaparin Injectable 40 milliGRAM(s) SubCutaneous daily  fenofibrate Tablet 145 milliGRAM(s) Oral daily  glucagon  Injectable 1 milliGRAM(s) IntraMuscular once  influenza   Vaccine 0.5 milliLiter(s) IntraMuscular once  insulin lispro (ADMELOG) corrective regimen sliding scale   SubCutaneous three times a day before meals  magnesium oxide 400 milliGRAM(s) Oral every 4 hours  metoprolol succinate ER 50 milliGRAM(s) Oral daily  pantoprazole    Tablet 40 milliGRAM(s) Oral before breakfast  QUEtiapine 25 milliGRAM(s) Oral at bedtime  sacubitril 49 mG/valsartan 51 mG 1 Tablet(s) Oral two times a day  spironolactone 25 milliGRAM(s) Oral daily  torsemide 20 milliGRAM(s) Oral daily    PRN Meds  ALBUTerol    90 MICROgram(s) HFA Inhaler 2 Puff(s) Inhalation every 6 hours PRN      Case discussed with resident  Care discussed with pt

## 2021-12-04 NOTE — PROGRESS NOTE ADULT - SUBJECTIVE AND OBJECTIVE BOX
Hospital Day:  5d    Subjective: Patient is a 56y old  Female who presents with a chief complaint of Acute on Chronic CHF (03 Dec 2021 11:05)      Pt seen and evaluated at bedside.   Complaints: none  Over the night Events: none    Past Medical Hx:   Hypertension    Hyperlipidemia    Anxiety and depression    COPD, severe    CHF (congestive heart failure)    Cerebrovascular accident (CVA)    Type 2 diabetes mellitus    CKD (chronic kidney disease), stage II      Past Sx:  No significant past surgical history    No significant past surgical history      Allergies:  No Known Allergies    Current Meds:   Standng Meds:  aspirin enteric coated 81 milliGRAM(s) Oral daily  atorvastatin 80 milliGRAM(s) Oral at bedtime  budesonide 160 MICROgram(s)/formoterol 4.5 MICROgram(s) Inhaler 2 Puff(s) Inhalation two times a day  chlorhexidine 4% Liquid 1 Application(s) Topical <User Schedule>  clopidogrel Tablet 75 milliGRAM(s) Oral daily  dextrose 40% Gel 15 Gram(s) Oral once  dextrose 5%. 1000 milliLiter(s) (50 mL/Hr) IV Continuous <Continuous>  dextrose 5%. 1000 milliLiter(s) (100 mL/Hr) IV Continuous <Continuous>  dextrose 50% Injectable 25 Gram(s) IV Push once  dextrose 50% Injectable 12.5 Gram(s) IV Push once  dextrose 50% Injectable 25 Gram(s) IV Push once  enoxaparin Injectable 40 milliGRAM(s) SubCutaneous daily  fenofibrate Tablet 145 milliGRAM(s) Oral daily  glucagon  Injectable 1 milliGRAM(s) IntraMuscular once  influenza   Vaccine 0.5 milliLiter(s) IntraMuscular once  insulin lispro (ADMELOG) corrective regimen sliding scale   SubCutaneous three times a day before meals  metoprolol succinate ER 50 milliGRAM(s) Oral daily  pantoprazole    Tablet 40 milliGRAM(s) Oral before breakfast  QUEtiapine 25 milliGRAM(s) Oral at bedtime  sacubitril 49 mG/valsartan 51 mG 1 Tablet(s) Oral two times a day  spironolactone 25 milliGRAM(s) Oral daily  torsemide 20 milliGRAM(s) Oral daily    PRN Meds:  ALBUTerol    90 MICROgram(s) HFA Inhaler 2 Puff(s) Inhalation every 6 hours PRN Shortness of Breath and/or Wheezing      Vital Signs:   T(F): 97.5 (12-04-21 @ 08:21), Max: 98.6 (12-03-21 @ 20:10)  HR: 78 (12-04-21 @ 08:21) (71 - 78)  BP: 191/80 (12-04-21 @ 08:21) (101/68 - 191/80)  RR: 20 (12-04-21 @ 08:21) (18 - 20)  SpO2: --    Physical Exam:   GENERAL: NAD, Resting in bed  HEENT: NCAT  CHEST/LUNG: Clear to auscultation bilaterally; No wheezing or rubs.   HEART: Regular rate and rhythm; No murmurs, rubs, or gallops  ABDOMEN: Bowel sounds present; Soft, Nontender, Nondistended.   EXTREMITIES:  No clubbing, cyanosis, or edema  NERVOUS SYSTEM:  Alert & Oriented X3    FLUID BALANCE    12-02-21 @ 07:01  -  12-03-21 @ 07:00  --------------------------------------------------------  IN: 1740 mL / OUT: 4500 mL / NET: -2760 mL    12-03-21 @ 07:01  -  12-04-21 @ 07:00  --------------------------------------------------------  IN: 2250 mL / OUT: 4000 mL / NET: -1750 mL    12-04-21 @ 07:01  -  12-04-21 @ 10:54  --------------------------------------------------------  IN: 260 mL / OUT: 900 mL / NET: -640 mL        Labs:                         15.8   5.52  )-----------( 329      ( 04 Dec 2021 05:28 )             49.8     Neutophil% 59.2, Lymphocyte% 28.3, Monocyte% 6.7, Bands% 0.4 12-04-21 @ 05:28    04 Dec 2021 05:28    137    |  91     |  25     ----------------------------<  152    3.8     |  25     |  0.9      Ca    9.4        04 Dec 2021 05:28  Mg     1.3       04 Dec 2021 05:28    TPro  7.5    /  Alb  4.0    /  TBili  1.0    /  DBili  x      /  AST  18     /  ALT  12     /  AlkPhos  102    04 Dec 2021 05:28            Serum Pro-Brain Natriuretic Peptide: 5736 pg/mL (11-29-21 @ 15:10)              Radiology:     < from: Xray Chest 1 View- PORTABLE-Urgent (11.29.21 @ 17:18) >    Impression:    Mild congestive heart failure.        --- End of Report ---      < end of copied text >

## 2021-12-05 LAB
ALBUMIN SERPL ELPH-MCNC: 3.9 G/DL — SIGNIFICANT CHANGE UP (ref 3.5–5.2)
ALP SERPL-CCNC: 96 U/L — SIGNIFICANT CHANGE UP (ref 30–115)
ALT FLD-CCNC: 13 U/L — SIGNIFICANT CHANGE UP (ref 0–41)
ANION GAP SERPL CALC-SCNC: 18 MMOL/L — HIGH (ref 7–14)
ANION GAP SERPL CALC-SCNC: 18 MMOL/L — HIGH (ref 7–14)
AST SERPL-CCNC: 20 U/L — SIGNIFICANT CHANGE UP (ref 0–41)
BASOPHILS # BLD AUTO: 0.08 K/UL — SIGNIFICANT CHANGE UP (ref 0–0.2)
BASOPHILS NFR BLD AUTO: 1.5 % — HIGH (ref 0–1)
BILIRUB SERPL-MCNC: 0.8 MG/DL — SIGNIFICANT CHANGE UP (ref 0.2–1.2)
BUN SERPL-MCNC: 31 MG/DL — HIGH (ref 10–20)
BUN SERPL-MCNC: 32 MG/DL — HIGH (ref 10–20)
CALCIUM SERPL-MCNC: 9.2 MG/DL — SIGNIFICANT CHANGE UP (ref 8.5–10.1)
CALCIUM SERPL-MCNC: 9.4 MG/DL — SIGNIFICANT CHANGE UP (ref 8.5–10.1)
CHLORIDE SERPL-SCNC: 91 MMOL/L — LOW (ref 98–110)
CHLORIDE SERPL-SCNC: 98 MMOL/L — SIGNIFICANT CHANGE UP (ref 98–110)
CO2 SERPL-SCNC: 24 MMOL/L — SIGNIFICANT CHANGE UP (ref 17–32)
CO2 SERPL-SCNC: 26 MMOL/L — SIGNIFICANT CHANGE UP (ref 17–32)
CREAT SERPL-MCNC: 1 MG/DL — SIGNIFICANT CHANGE UP (ref 0.7–1.5)
CREAT SERPL-MCNC: 1 MG/DL — SIGNIFICANT CHANGE UP (ref 0.7–1.5)
EOSINOPHIL # BLD AUTO: 0.23 K/UL — SIGNIFICANT CHANGE UP (ref 0–0.7)
EOSINOPHIL NFR BLD AUTO: 4.2 % — SIGNIFICANT CHANGE UP (ref 0–8)
GLUCOSE BLDC GLUCOMTR-MCNC: 129 MG/DL — HIGH (ref 70–99)
GLUCOSE BLDC GLUCOMTR-MCNC: 151 MG/DL — HIGH (ref 70–99)
GLUCOSE BLDC GLUCOMTR-MCNC: 176 MG/DL — HIGH (ref 70–99)
GLUCOSE BLDC GLUCOMTR-MCNC: 181 MG/DL — HIGH (ref 70–99)
GLUCOSE SERPL-MCNC: 152 MG/DL — HIGH (ref 70–99)
GLUCOSE SERPL-MCNC: 178 MG/DL — HIGH (ref 70–99)
HCT VFR BLD CALC: 47.4 % — HIGH (ref 37–47)
HGB BLD-MCNC: 15.1 G/DL — SIGNIFICANT CHANGE UP (ref 12–16)
IMM GRANULOCYTES NFR BLD AUTO: 0.2 % — SIGNIFICANT CHANGE UP (ref 0.1–0.3)
LYMPHOCYTES # BLD AUTO: 1.68 K/UL — SIGNIFICANT CHANGE UP (ref 1.2–3.4)
LYMPHOCYTES # BLD AUTO: 30.8 % — SIGNIFICANT CHANGE UP (ref 20.5–51.1)
MAGNESIUM SERPL-MCNC: 1.5 MG/DL — LOW (ref 1.8–2.4)
MCHC RBC-ENTMCNC: 27.8 PG — SIGNIFICANT CHANGE UP (ref 27–31)
MCHC RBC-ENTMCNC: 31.9 G/DL — LOW (ref 32–37)
MCV RBC AUTO: 87.1 FL — SIGNIFICANT CHANGE UP (ref 81–99)
MONOCYTES # BLD AUTO: 0.4 K/UL — SIGNIFICANT CHANGE UP (ref 0.1–0.6)
MONOCYTES NFR BLD AUTO: 7.3 % — SIGNIFICANT CHANGE UP (ref 1.7–9.3)
NEUTROPHILS # BLD AUTO: 3.05 K/UL — SIGNIFICANT CHANGE UP (ref 1.4–6.5)
NEUTROPHILS NFR BLD AUTO: 56 % — SIGNIFICANT CHANGE UP (ref 42.2–75.2)
NRBC # BLD: 0 /100 WBCS — SIGNIFICANT CHANGE UP (ref 0–0)
PLATELET # BLD AUTO: 323 K/UL — SIGNIFICANT CHANGE UP (ref 130–400)
POTASSIUM SERPL-MCNC: 3.8 MMOL/L — SIGNIFICANT CHANGE UP (ref 3.5–5)
POTASSIUM SERPL-MCNC: 3.9 MMOL/L — SIGNIFICANT CHANGE UP (ref 3.5–5)
POTASSIUM SERPL-SCNC: 3.8 MMOL/L — SIGNIFICANT CHANGE UP (ref 3.5–5)
POTASSIUM SERPL-SCNC: 3.9 MMOL/L — SIGNIFICANT CHANGE UP (ref 3.5–5)
PROT SERPL-MCNC: 7.2 G/DL — SIGNIFICANT CHANGE UP (ref 6–8)
RBC # BLD: 5.44 M/UL — HIGH (ref 4.2–5.4)
RBC # FLD: 16.7 % — HIGH (ref 11.5–14.5)
SODIUM SERPL-SCNC: 135 MMOL/L — SIGNIFICANT CHANGE UP (ref 135–146)
SODIUM SERPL-SCNC: 140 MMOL/L — SIGNIFICANT CHANGE UP (ref 135–146)
WBC # BLD: 5.45 K/UL — SIGNIFICANT CHANGE UP (ref 4.8–10.8)
WBC # FLD AUTO: 5.45 K/UL — SIGNIFICANT CHANGE UP (ref 4.8–10.8)

## 2021-12-05 PROCEDURE — 99233 SBSQ HOSP IP/OBS HIGH 50: CPT

## 2021-12-05 PROCEDURE — 99222 1ST HOSP IP/OBS MODERATE 55: CPT

## 2021-12-05 RX ORDER — MAGNESIUM OXIDE 400 MG ORAL TABLET 241.3 MG
400 TABLET ORAL EVERY 4 HOURS
Refills: 0 | Status: COMPLETED | OUTPATIENT
Start: 2021-12-05 | End: 2021-12-05

## 2021-12-05 RX ORDER — MAGNESIUM SULFATE 500 MG/ML
2 VIAL (ML) INJECTION
Refills: 0 | Status: COMPLETED | OUTPATIENT
Start: 2021-12-05 | End: 2021-12-05

## 2021-12-05 RX ADMIN — CLOPIDOGREL BISULFATE 75 MILLIGRAM(S): 75 TABLET, FILM COATED ORAL at 11:55

## 2021-12-05 RX ADMIN — SACUBITRIL AND VALSARTAN 1 TABLET(S): 24; 26 TABLET, FILM COATED ORAL at 05:08

## 2021-12-05 RX ADMIN — Medication 2: at 08:10

## 2021-12-05 RX ADMIN — MAGNESIUM OXIDE 400 MG ORAL TABLET 400 MILLIGRAM(S): 241.3 TABLET ORAL at 17:46

## 2021-12-05 RX ADMIN — PANTOPRAZOLE SODIUM 40 MILLIGRAM(S): 20 TABLET, DELAYED RELEASE ORAL at 05:08

## 2021-12-05 RX ADMIN — Medication 81 MILLIGRAM(S): at 11:55

## 2021-12-05 RX ADMIN — SPIRONOLACTONE 25 MILLIGRAM(S): 25 TABLET, FILM COATED ORAL at 05:08

## 2021-12-05 RX ADMIN — CHLORHEXIDINE GLUCONATE 1 APPLICATION(S): 213 SOLUTION TOPICAL at 05:09

## 2021-12-05 RX ADMIN — Medication 20 MILLIGRAM(S): at 05:08

## 2021-12-05 RX ADMIN — ENOXAPARIN SODIUM 40 MILLIGRAM(S): 100 INJECTION SUBCUTANEOUS at 11:55

## 2021-12-05 RX ADMIN — SACUBITRIL AND VALSARTAN 1 TABLET(S): 24; 26 TABLET, FILM COATED ORAL at 17:46

## 2021-12-05 RX ADMIN — Medication 145 MILLIGRAM(S): at 11:56

## 2021-12-05 RX ADMIN — Medication 50 MILLIGRAM(S): at 05:08

## 2021-12-05 RX ADMIN — Medication 2: at 17:48

## 2021-12-05 RX ADMIN — QUETIAPINE FUMARATE 25 MILLIGRAM(S): 200 TABLET, FILM COATED ORAL at 21:34

## 2021-12-05 RX ADMIN — ATORVASTATIN CALCIUM 80 MILLIGRAM(S): 80 TABLET, FILM COATED ORAL at 21:34

## 2021-12-05 RX ADMIN — MAGNESIUM OXIDE 400 MG ORAL TABLET 400 MILLIGRAM(S): 241.3 TABLET ORAL at 17:51

## 2021-12-05 NOTE — PROGRESS NOTE ADULT - ASSESSMENT
Chronic systolic CHF  Likely non-ischemic, as the degree of LV dysfunction is out of proportion to the coronary disease (single vessel RCA disease, s/p successful PCI),  Now euvolemic  NSVT - asymptomatic    Cath, prior echo, nuclear stress test reviewed.  Telemetry reviewed.    Recommend:    D/c IV diuretics, switch to PO torsemide. Keep in even balance.    C/w DAPT, statin    C/w Entresto, BB, spironolactone. Monitor K, Mg    EP for LifeVest/ICD.    Discussed with the patient at bedside.

## 2021-12-05 NOTE — CONSULT NOTE ADULT - SUBJECTIVE AND OBJECTIVE BOX
Patient is a 56y old  Female who presents with a chief complaint of CHF (05 Dec 2021 10:53)        HPI:  History of Present Illness  Ms. Ramos is a 56 year old (smoker) female patient known to have:  - Depression. Home med Seroquel 25 QD   - COPD and chronic hypoxic RF. On home O2 4LPM. Home med Albuterol 2 puffs Q6h PRN, Symbicort 2 puffs BID  - Dyslipidemia/ History of CVA with residual Left LE weakness. Uses wheelchair at baseline. Lipid Profile (11.10.21 @ 18:31) Cholesterol, Serum: 89 mg/dL; Triglycerides, Serum: 174 mg/dL; HDL Cholesterol, Serum: 24 mg/dL. Home meds Atorvastatin 80 mg QD, Aspirin 81mg QD, Plavix 75mg QD, Fenofibrate 145mg QD  - HFrEF. TTE 11/14 EF 15-20%, DDII, WMA, mod MR, mild-mod TR, severe PHTN, small pericardial effusion. Last evaluated by Dr Buitrago on 11/15 and plan was to perform cardiac cath post diuresis. Recent admission 11/08-11/17 for HFrEF Exacerbation s/p HF evaluation. Home med Torsemide 10mg QD and supposed to be on aldactone 25mg QD, Toprol 50mg QD, Dapagliflozin 10mg QD, Entresto 49/51mg BID  - HTN. Home meds Toprol 50mg QD, Entresto 49/51mg BID, Torsemide 10mg QD, aldactone 25mg QD  - DM II. Last Hba1c 7.7 11/10. Home meds metformin 1g BID, Glipizide 10mg BID, Dapagliflozin 10mg     She presented to the ED on 11/29 following an episode of desaturation at clinic.   History goes back to this afternoon when the patient's oxygen ran off.  This was followed 5 minutes later by an episode of desaturation down to 77%.  Patient had shortness of breath along with light headedness during the episode.  She otherwise denied chest pain, palpitations, diaphoresis, or nausea.  Over the last few weeks PTP, patient has noted worsening shortness of breath at rest and on exertion.  She has also noted LE swelling, weight gain, orthopnea, and PNDs.    On review of systems, patient denies any recent fever, chills, night sweats, URTI symptoms (cough, rhinorrhea, sore throat), urinary symptoms (urinary frequency, urgency, intermittence, dysuria, foul smelling urine, cloudy urine), change in bowel movements (diarrhea or constipation), abdominal pain, headache, nausea, or vomiting.   No sick contacts.   No recent travel or exposure to recent travelers.     (29 Nov 2021 19:35)      Electrophysiology:  56y Female, smoker, with h/o depression COPD and chronic hypoxic RF. On home O2 4LPM, HLD, CVA, CAD, NSTEMI, prior PCI, last 6/2021, HFrEF, EF 15-20%, with recently worsening HF symptoms, with dyspnea at rest and minimal exertion LE edema. Was in the clinic when her oxygen run out and pt desated to 70s%. Pt is s/p RCA RICARDO 6/2021 but did not follow up after. Until recently patient was not compliant with GDMT medication regiments due to "pharmacy mix up." Recent admission 11/8-17/2021 with NSTEMI and acute on chronic HF exacerbation, was optimized on GDMT then.  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  [ ] Due to altered mental status/intubation, subjective information were not able to be obtained from the patient. History was obtained, to the extent possible, from review of the chart and collateral sources of information.      PAST MEDICAL & SURGICAL HISTORY:  Hypertension    Hyperlipidemia    Anxiety and depression    COPD, severe    CHF (congestive heart failure)    Cerebrovascular accident (CVA)  Multiple    Type 2 diabetes mellitus    CKD (chronic kidney disease), stage II    No significant past surgical history        Home Medications:  Albuterol (Eqv-ProAir HFA) 90 mcg/inh inhalation aerosol: 2 puff(s) inhaled every 6 hours, As Needed (07 Jun 2021 11:22)  aspirin 81 mg oral tablet: 1 tab(s) orally once a day (07 Jun 2021 11:22)  fenofibrate 145 mg oral tablet: 1 tab(s) orally once a day (07 Jun 2021 11:22)  glipiZIDE 10 mg oral tablet: 1 tab(s) orally 2 times a day (07 Jun 2021 11:22)  Lovaza 1000 mg oral capsule: 2 cap(s) orally 2 times a day (08 Nov 2021 14:52)  magnesium oxide 400 mg oral tablet: 1 tab(s) orally 3 times a day (with meals) (16 Nov 2021 12:10)  metFORMIN 1000 mg oral tablet: 1 tab(s) orally 2 times a day Do not take until 6/23 (22 Jun 2021 12:58)  pantoprazole 40 mg oral delayed release tablet: 1 tab(s) orally once a day (before a meal) (16 Nov 2021 12:10)  Plavix 75 mg oral tablet: 1 tab(s) orally once a day (07 Jun 2021 11:22)  QUEtiapine 25 mg oral tablet: 1 tab(s) orally once a day (at bedtime) (16 Nov 2021 12:10)  Vitamin D2 1.25 mg (50,000 intl units) oral capsule: 1 cap(s) orally once a week (08 Nov 2021 14:52)      Allergies:  No Known Allergies      FAMILY HISTORY:      SOCIAL HISTORY:     CIGARETTES:  ALCOHOL:        PREVIOUS DIAGNOSTIC TESTING:      ECHO  FINDINGS:  < from: TTE Echo Complete w/o Contrast w/ Doppler (11.14.21 @ 08:13) >  Summary:   1. Left ventricular ejection fraction, by visual estimation, is 15-20%.   2. Severely decreased global left ventricular systolic function.   3. Multiple left ventricular regional wall motion abnormalities exist. See wall motion findings.   4. Mild concentric left ventricular hypertrophy.   5. Spectral Doppler shows pseudonormal pattern of left ventricular myocardial filling (Grade II diastolic dysfunction).   6. Mildly enlarged left atrium.   7. Mildly enlarged right atrium.   8. Sclerotic aortic valve with normal opening.   9. Mild aortic regurgitation.  10. The mitral valve leaflets are tethered due to reduced systolic function and elevated LVDP.  11. Moderate mitral regurgitation.  12. Mild-to-moderate tricuspid regurgitation.  13. Estimated pulmonary artery systolic pressure is 60.2 mmHg assuming a right atrial pressure of 15 mmHg, which is consistent with severe pulmonary hypertension.  14. Small pericardial effusion adjacent to the right atrium.    < end of copied text >    STRESS  FINDINGS:    < from: NM Nuclear Stress Pharmacologic Multiple (06.16.21 @ 12:58) >  Impression:  1. MODERATE REVERSIBLE DEFECT IN THE SEPTUM, ANTERIOR AND INFERIOR WALL OF THE LEFT VENTRICLE CONSISTENT WITH ISCHEMIA.  2. DILATED LEFT VENTRICLE WITH MARKED GLOBAL HYPOKINESIS. THERE IS INCOMPLETE THICKENING OF THE SEPTUM ANTERIOR AND INFERIOR WALL OF THE LEFT VENTRICLE RAISING QUESTION OF HIBERNATING MYOCARDIUM. THE LATERAL WALL OF THE LEFT VENTRICLE THICKENS NORMALLY.  3. LEFT VENTRICULAR EJECTION FRACTION OF <20 % WHICH IS VERY LOW    < end of copied text >  CATHETERIZATION  FINDINGS:  < from: Cardiac Cath Lab - Adult (06.21.21 @ 11:51) >  LEFT HEART CATHETERIZATION                                  Left main normal   LAD:       mild disease                Diag: patent  Left Circumflex: minor irregularities  OM: normal   Right Coronary Artery: Prox mild disease, Mid 70-95% diffuse lesion , Distal minor irregularities  RPDA normal   RPL normal   DOMINANCE: Right    INTERVENTION  IVUS: RCA  Successful PCI to Mid RCA with balloon angioplasty and SYNERGY 3.5 x 28 mm   POST-OP DIAGNOSIS  Significant RCA lesion, AUC score 7       CHEST CT PULMONARY ANGIO with IV Contrast:  FINDINGS:  < from: CT Angio Chest PE Protocol w/ IV Cont (11.10.21 @ 02:59) >  INTERPRETATION:    PULMONARY ARTERIES: There are no central pulmonary emboli. The segmental and subsegmental branches are obscured due to motion.    AIRWAYS/LUNGS/PLEURA: Breathing motion artifact limits evaluation of the lung parenchyma. The central tracheobronchial tree is patent.  Interlobular septal thickening. Small bilateral pleural effusions with adjacent atelectasis. There is no pleural effusion. There is no pneumothorax.    MEDIASTINUM: There are no enlarged mediastinal, hilar or axillary lymph nodes. The visualized portion of the thyroid gland is unremarkable.    HEART AND VESSELS: There is mild cardiomegaly. There are coronary artery and aortic calcifications. The thoracic aorta has a normal caliber. There is no pericardial effusion.    UPPER ABDOMEN: Images of the upper abdomen demonstrate no abnormality.    BONES AND SOFT TISSUES: There are degenerative changes of the spine.  Bilateral flank anasarca.    TUBES AND LINES: None.    IMPRESSION:    1. No central pulmonary embolism.    2. Findings compatible with interstitial pulmonary edema with associated small bilateral pleural effusions.    < end of copied text >      MEDICATIONS  (STANDING):  aspirin enteric coated 81 milliGRAM(s) Oral daily  atorvastatin 80 milliGRAM(s) Oral at bedtime  budesonide 160 MICROgram(s)/formoterol 4.5 MICROgram(s) Inhaler 2 Puff(s) Inhalation two times a day  chlorhexidine 4% Liquid 1 Application(s) Topical <User Schedule>  clopidogrel Tablet 75 milliGRAM(s) Oral daily  dextrose 40% Gel 15 Gram(s) Oral once  dextrose 5%. 1000 milliLiter(s) (50 mL/Hr) IV Continuous <Continuous>  dextrose 5%. 1000 milliLiter(s) (100 mL/Hr) IV Continuous <Continuous>  dextrose 50% Injectable 25 Gram(s) IV Push once  dextrose 50% Injectable 12.5 Gram(s) IV Push once  dextrose 50% Injectable 25 Gram(s) IV Push once  enoxaparin Injectable 40 milliGRAM(s) SubCutaneous daily  fenofibrate Tablet 145 milliGRAM(s) Oral daily  glucagon  Injectable 1 milliGRAM(s) IntraMuscular once  influenza   Vaccine 0.5 milliLiter(s) IntraMuscular once  insulin lispro (ADMELOG) corrective regimen sliding scale   SubCutaneous three times a day before meals  magnesium sulfate  IVPB 2 Gram(s) IV Intermittent every 2 hours  metoprolol succinate ER 50 milliGRAM(s) Oral daily  pantoprazole    Tablet 40 milliGRAM(s) Oral before breakfast  QUEtiapine 25 milliGRAM(s) Oral at bedtime  sacubitril 49 mG/valsartan 51 mG 1 Tablet(s) Oral two times a day  spironolactone 25 milliGRAM(s) Oral daily  torsemide 20 milliGRAM(s) Oral daily    MEDICATIONS  (PRN):  ALBUTerol    90 MICROgram(s) HFA Inhaler 2 Puff(s) Inhalation every 6 hours PRN Shortness of Breath and/or Wheezing      Vital Signs Last 24 Hrs  T(C): 36.7 (05 Dec 2021 05:14), Max: 36.7 (05 Dec 2021 05:14)  T(F): 98.1 (05 Dec 2021 05:14), Max: 98.1 (05 Dec 2021 05:14)  HR: 71 (05 Dec 2021 05:14) (68 - 77)  BP: 118/57 (05 Dec 2021 05:14) (118/57 - 128/85)  BP(mean): --  RR: 18 (05 Dec 2021 05:14) (18 - 20)  SpO2: --    PHYSICAL EXAM:    GENERAL: In no apparent distress, well nourished, and hydrated.  HEAD:  Atraumatic, Normocephalic  EYES: EOMI, PERRLA, conjunctiva and sclera clear  NECK: Supple and normal thyroid.  No JVD or carotid bruit.  Carotid pulse is 2+ bilaterally.  HEART: Regular rate and rhythm; No murmurs, rubs, or gallops.  PULMONARY: Clear to auscultation and perfusion.  No rales, wheezing, or rhonchi bilaterally.  ABDOMEN: Soft, Nontender, Nondistended; Bowel sounds present  EXTREMITIES:  2+ Peripheral Pulses, No clubbing, cyanosis, or edema  NEUROLOGICAL: Grossly nonfocal    I&O's Detail    04 Dec 2021 07:01  -  05 Dec 2021 07:00  --------------------------------------------------------  IN:    Bumetanide: 10 mL    Oral Fluid: 500 mL    sodium chloride 3%: 100 mL  Total IN: 610 mL    OUT:    Voided (mL): 1450 mL  Total OUT: 1450 mL    Total NET: -840 mL        Daily     Daily     INTERPRETATION OF TELEMETRY:    ECG:  < from: 12 Lead ECG (11.29.21 @ 15:41) >  Ventricular Rate 48 BPM    Atrial Rate 48 BPM    P-R Interval 142 ms    QRS Duration 90 ms    Q-T Interval 448 ms    QTC Calculation(Bazett) 400 ms    P Axis 22 degrees    R Axis 37 degrees    T Axis -13 degrees    Diagnosis Line Sinus bradycardia  Inferior infarct , age undetermined  Abnormal ECG    < end of copied text >        LABS:                        15.1   5.45  )-----------( 323      ( 05 Dec 2021 04:30 )             47.4     12-05    135  |  91<L>  |  31<H>  ----------------------------<  178<H>  3.8   |  26  |  1.0    Ca    9.2      05 Dec 2021 04:30  Mg     1.5     12-05    TPro  7.2  /  Alb  3.9  /  TBili  0.8  /  DBili  x   /  AST  20  /  ALT  13  /  AlkPhos  96  12-05            BNP            RADIOLOGY & ADDITIONAL STUDIES:

## 2021-12-05 NOTE — PROGRESS NOTE ADULT - ASSESSMENT
55 yo F PMHx COPD on home O2, HFrEF, HTN, DLD, CVA with residual left LE weakness wheechair bound, and DM II who presented with progressive dyspnea and hypoxia due to  acute on chronic HFrEF decompensation. Pt did not receive her diuretics at home which likely precipitated exacerbation     Acute on chronic CHF exacerbation/recurrent readmission for CHF  -TTE 11/14 EF 15-20%, DDII, WMA, mod MR, mild-mod TR, severe PHTN, small pericardial effusion  -remains euvolemic on PO toremside  - EF on echo shows 15%, down grom 35% 6/2021  - case d/w EP, cardio, HF, suspect EF from 6/2021 was lower than read but pt still candidate for lifevest. Pt to discuss with her brotherArash contacted by EP for lifevest for tomorrow  - c/w telemetry monitoring given low EF high risk ventricular arrhytmia  -strict I and O chart  - trop 0.05 l;ikely due to cardiac stretch from CHF exacerbation   -daily weight, fluid restriction  -continue toprol XL, entresto, spironolactone- monitor b/p  - pending HF team follow up    Acute on chronic hypoxia  - h/o COPD  - likely due to volume overload  - now on 4 L sat 96%- will taper down goal of pulse ox 89-92%  -nebs/inhalers tx.     HTN  - continue  toprol, spironolactone    CVA/Dyslipidemia-   stable - continue atrovatatin, aspirin    DM 2   - controlled  - c/w insulin    dVT proph- lovenox subc. tx.  code status: full code    #Progress Note Handoff:  Pending (specify): Lifevest anticiapte for dc in AM  Family discussion: discussed lifevest, low EF  Disposition: Home___/SNF___/Other________/Unknown at this time________

## 2021-12-05 NOTE — CONSULT NOTE ADULT - ASSESSMENT
Cards : Dr Murphy  HF: Dr Ge    56y Female, smoker, with h/o depression COPD and chronic hypoxic RF. On home O2 4LPM, HTN, HLD, DM2, CVA, CAD, NSTEMI, prior PCI, last 6/2021, HFrEF, EF 15-20%, with recently worsening HF symptoms  EF 15-20%  being optimized on GDMT    acute on chronic systolic HF  CAD, prior PCI  h/o NSTEMI  h/o AVNRT  COPD on home O2      con't tele  con't diuresis  optimize GDMT  Maintain electrolytes K>4.0 Mg >2.0   patient will benefit for wearable defibrilator for primary prevention  patient is agreeable but want to discuss it with her brother Pérez Richard  [phone]  will contact Arash  follow up as out pt in 3 month to re-evaluate LV function

## 2021-12-05 NOTE — PROGRESS NOTE ADULT - SUBJECTIVE AND OBJECTIVE BOX
Patient is a 56y old  Female who presents with a chief complaint of CHF exacerbation (04 Dec 2021 10:50)    HPI:  History of Present Illness  Ms. Ramos is a 56 year old (smoker) female patient known to have:  - Depression. Home med Seroquel 25 QD   - COPD and chronic hypoxic RF. On home O2 4LPM. Home med Albuterol 2 puffs Q6h PRN, Symbicort 2 puffs BID  - Dyslipidemia/ History of CVA with residual Left LE weakness. Uses wheelchair at baseline. Lipid Profile (11.10.21 @ 18:31) Cholesterol, Serum: 89 mg/dL; Triglycerides, Serum: 174 mg/dL; HDL Cholesterol, Serum: 24 mg/dL. Home meds Atorvastatin 80 mg QD, Aspirin 81mg QD, Plavix 75mg QD, Fenofibrate 145mg QD  - HFrEF. TTE 11/14 EF 15-20%, DDII, WMA, mod MR, mild-mod TR, severe PHTN, small pericardial effusion. Last evaluated by Dr Buitrago on 11/15 and plan was to perform cardiac cath post diuresis. Recent admission 11/08-11/17 for HFrEF Exacerbation s/p HF evaluation. Home med Torsemide 10mg QD and supposed to be on aldactone 25mg QD, Toprol 50mg QD, Dapagliflozin 10mg QD, Entresto 49/51mg BID  - HTN. Home meds Toprol 50mg QD, Entresto 49/51mg BID, Torsemide 10mg QD, aldactone 25mg QD  - DM II. Last Hba1c 7.7 11/10. Home meds metformin 1g BID, Glipizide 10mg BID, Dapagliflozin 10mg       She presented to the ED on 11/29 following an episode of desaturation at clinic.   History goes back to this afternoon when the patient's oxygen ran off.  This was followed 5 minutes later by an episode of desaturation down to 77%.  Patient had shortness of breath along with light headedness during the episode.  She otherwise denied chest pain, palpitations, diaphoresis, or nausea.  Over the last few weeks PTP, patient has noted worsening shortness of breath at rest and on exertion.  She has also noted LE swelling, weight gain, orthopnea, and PNDs.      On review of systems, patient denies any recent fever, chills, night sweats, URTI symptoms (cough, rhinorrhea, sore throat), urinary symptoms (urinary frequency, urgency, intermittence, dysuria, foul smelling urine, cloudy urine), change in bowel movements (diarrhea or constipation), abdominal pain, headache, nausea, or vomiting.   No sick contacts.   No recent travel or exposure to recent travelers.      Upon presentation to the ED, the patient was hemodynamically stable:  Vital Signs in ED   - /99mmhg  - HR 87 bpm  - RR 22 bpm  - SaO2 99% on 4LPM      Investigations   Laboratory Workup  - CBC:                        13.1   5.90  )-----------( 286      ( 29 Nov 2021 15:10 )             40.9     - Chemistry:  11-29    138  |  100  |  18  ----------------------------<  110<H>  4.4   |  19  |  0.8    Ca    9.2      29 Nov 2021 15:10    TPro  7.2  /  Alb  3.8  /  TBili  2.1<H>  /  DBili  x   /  AST  28  /  ALT  18  /  AlkPhos  103  11-29    Pro BNP 5736    - Cardiac Markers:  CARDIAC MARKERS ( 29 Nov 2021 15:10 )  x     / 0.05 ng/mL / x     / x     / x          - Patient was given IV Lasix 40mg x1 dose now  - Will follow urine output in the coming 4-6 hours  - Patient will be admitted for further investigations, monitoring, and management     (29 Nov 2021 19:35)      SUBJ:  Patient seen and examined. Events noted. Consultation recalled for episode of NSVT on telemetry, consideration of repeat ischemic w/u. Patient reports no dyspnea or edema, comfortable when supine in bed. No orthopnea or PND.      MEDICATIONS  (STANDING):  aspirin enteric coated 81 milliGRAM(s) Oral daily  atorvastatin 80 milliGRAM(s) Oral at bedtime  budesonide 160 MICROgram(s)/formoterol 4.5 MICROgram(s) Inhaler 2 Puff(s) Inhalation two times a day  chlorhexidine 4% Liquid 1 Application(s) Topical <User Schedule>  clopidogrel Tablet 75 milliGRAM(s) Oral daily  dextrose 40% Gel 15 Gram(s) Oral once  dextrose 5%. 1000 milliLiter(s) (50 mL/Hr) IV Continuous <Continuous>  dextrose 5%. 1000 milliLiter(s) (100 mL/Hr) IV Continuous <Continuous>  dextrose 50% Injectable 25 Gram(s) IV Push once  dextrose 50% Injectable 12.5 Gram(s) IV Push once  dextrose 50% Injectable 25 Gram(s) IV Push once  enoxaparin Injectable 40 milliGRAM(s) SubCutaneous daily  fenofibrate Tablet 145 milliGRAM(s) Oral daily  glucagon  Injectable 1 milliGRAM(s) IntraMuscular once  influenza   Vaccine 0.5 milliLiter(s) IntraMuscular once  insulin lispro (ADMELOG) corrective regimen sliding scale   SubCutaneous three times a day before meals  magnesium sulfate  IVPB 2 Gram(s) IV Intermittent every 2 hours  metoprolol succinate ER 50 milliGRAM(s) Oral daily  pantoprazole    Tablet 40 milliGRAM(s) Oral before breakfast  QUEtiapine 25 milliGRAM(s) Oral at bedtime  sacubitril 49 mG/valsartan 51 mG 1 Tablet(s) Oral two times a day  spironolactone 25 milliGRAM(s) Oral daily  torsemide 20 milliGRAM(s) Oral daily    MEDICATIONS  (PRN):  ALBUTerol    90 MICROgram(s) HFA Inhaler 2 Puff(s) Inhalation every 6 hours PRN Shortness of Breath and/or Wheezing            Vital Signs Last 24 Hrs  T(C): 36.7 (05 Dec 2021 05:14), Max: 36.7 (05 Dec 2021 05:14)  T(F): 98.1 (05 Dec 2021 05:14), Max: 98.1 (05 Dec 2021 05:14)  HR: 71 (05 Dec 2021 05:14) (68 - 77)  BP: 118/57 (05 Dec 2021 05:14) (118/57 - 128/85)  BP(mean): --  RR: 18 (05 Dec 2021 05:14) (18 - 20)  SpO2: --      PHYSICAL EXAM:    GEN: AAO x 3, NAD  HEENT: NC/AT, PERRL  Neck: No JVD, no bruits  CV: Reg, S1-S2, no murmur  Lungs: CTAB  Abd: Soft, non-tender  Ext: No edema        12-04-21 @ 07:01  -  12-05-21 @ 07:00  --------------------------------------------------------  IN: 610 mL / OUT: 1450 mL / NET: -840 mL        I&O's Summary    04 Dec 2021 07:01  -  05 Dec 2021 07:00  --------------------------------------------------------  IN: 610 mL / OUT: 1450 mL / NET: -840 mL    	    TELEMETRY:    NSR, ST, NSVT    ECG:  < from: 12 Lead ECG (11.29.21 @ 15:41) >  Sinus bradycardia  Inferior infarct , age undetermined  Abnormal ECG    < end of copied text >    TTE:  < from: TTE Echo Complete w/o Contrast w/ Doppler (11.14.21 @ 08:13) >    Summary:   1. Left ventricular ejection fraction, by visual estimation, is 15-20%.   2. Severely decreased global left ventricular systolic function.   3. Multiple left ventricular regional wall motion abnormalities exist. See wall motion findings.   4. Mild concentric left ventricular hypertrophy.   5. Spectral Doppler shows pseudonormal pattern of left ventricular myocardial filling (Grade II diastolic dysfunction).   6. Mildly enlarged left atrium.   7. Mildly enlarged right atrium.   8. Sclerotic aortic valve with normal opening.   9. Mild aortic regurgitation.  10. The mitral valve leaflets are tethered due to reduced systolic function and elevated LVDP.  11. Moderate mitral regurgitation.  12. Mild-to-moderate tricuspid regurgitation.  13. Estimated pulmonary artery systolic pressure is 60.2 mmHg assuming a right atrial pressure of 15 mmHg, which is consistent with severe pulmonary hypertension.  14. Small pericardial effusion adjacent to the right atrium.    < end of copied text >      NUCLEAR:  < from: NM Nuclear Stress Pharmacologic Multiple (06.16.21 @ 12:58) >  Impression:  1. MODERATE REVERSIBLE DEFECT IN THE SEPTUM, ANTERIOR AND INFERIOR WALL OF THE LEFT VENTRICLE CONSISTENT WITH ISCHEMIA.  2. DILATED LEFT VENTRICLE WITH MARKED GLOBAL HYPOKINESIS. THERE IS INCOMPLETE THICKENING OF THE SEPTUM ANTERIOR AND INFERIOR WALL OF THE LEFT VENTRICLE RAISING QUESTION OF HIBERNATING MYOCARDIUM. THE LATERAL WALL OF THE LEFT VENTRICLE THICKENS NORMALLY.  3. LEFT VENTRICULAR EJECTION FRACTION OF <20 % WHICH IS VERY LOW. ECHOCARDIOGRAPHIC ESTIMATED EJECTION FRACTION 6/14/2021 WAS 35 TO 40%.    < end of copied text >      CATH:    < from: Cardiac Cath Lab - Adult (06.21.21 @ 11:51) >  CORONARY CIRCULATION: The coronary circulation is right dominant. There was    severe 1-vessel coronary artery disease (RCA). Left main: Normal. LAD:    Angiography showed mild atherosclerosis with no flow limiting lesions. 1st    diagonal: Angiography showed mild atherosclerosis with no flow limiting    lesions. Circumflex: Angiography showed minor luminal irregularities with    no flow limiting lesions. 1st obtuse marginal: Normal. Proximal RCA:    Angiography showed mild atherosclerosisMid RCA: There was a diffuse 90 %    stenosis at a site with no prior intervention. This is a likely culprit    for the patient's clinical presentation. An intervention was performed.    Distal RCA: Angiography showed minor luminal irregularities with no flow    limiting lesions. Right PDA: Normal.         < end of copied text >  < from: Cardiac Cath Lab - Adult (06.21.21 @ 11:51) >    LESION INTERVENTION: A successful drug-eluting stent with balloon    angioplasty and intravascular ultrasound was performed on the 90 % lesion    in the mid RCA. Following intervention there was an excellent angiographic    < end of copied text >      LABS:                        15.1   5.45  )-----------( 323      ( 05 Dec 2021 04:30 )             47.4     12-05    135  |  91<L>  |  31<H>  ----------------------------<  178<H>  3.8   |  26  |  1.0    Ca    9.2      05 Dec 2021 04:30  Mg     1.5     12-05    TPro  7.2  /  Alb  3.9  /  TBili  0.8  /  DBili  x   /  AST  20  /  ALT  13  /  AlkPhos  96  12-05              BNP  RADIOLOGY & ADDITIONAL STUDIES:      IMPRESSION AND PLAN:

## 2021-12-05 NOTE — PROGRESS NOTE ADULT - PROVIDER SPECIALTY LIST ADULT
Heart Failure
Internal Medicine
Heart Failure
Internal Medicine
Internal Medicine
Cardiology

## 2021-12-05 NOTE — PROGRESS NOTE ADULT - SUBJECTIVE AND OBJECTIVE BOX
CHIEF COMPLAINT:    Patient is a 56y old  Female who presents with a chief complaint of CHF     INTERVAL HPI/OVERNIGHT EVENTS:    Patient seen and examined at bedside. No acute overnight events occurred.    ROS: Denies chest pain. All other systems are negative.    Vital Signs:    T(F): 97.8 (12-05-21 @ 13:19), Max: 98.1 (12-05-21 @ 05:14)  HR: 78 (12-05-21 @ 13:19) (68 - 78)  BP: 124/71 (12-05-21 @ 13:19) (118/57 - 128/85)  RR: 18 (12-05-21 @ 13:19) (18 - 19)  SpO2: --  I&O's Summary    04 Dec 2021 07:01  -  05 Dec 2021 07:00  --------------------------------------------------------  IN: 610 mL / OUT: 1450 mL / NET: -840 mL      Daily     Daily   CAPILLARY BLOOD GLUCOSE      POCT Blood Glucose.: 151 mg/dL (05 Dec 2021 16:20)  POCT Blood Glucose.: 129 mg/dL (05 Dec 2021 11:36)  POCT Blood Glucose.: 181 mg/dL (05 Dec 2021 07:43)  POCT Blood Glucose.: 146 mg/dL (04 Dec 2021 21:19)      PHYSICAL EXAM:  GENERAL:  NAD  SKIN: No rashes or lesions  HEENT: Atraumatic. Normocephalic. Anicteric  NECK:  No JVD.   PULMONARY: Clear to ausculation bilaterally. No wheezing. No rales  CVS: Normal S1, S2. Regular rate and rhythm. No murmurs.  ABDOMEN/GI: Soft, Nontender, Nondistended; Bowel sounds are present  EXTREMITIES:  No edema B/L LE.  NEUROLOGIC:  No motor deficit.  PSYCH: Alert & oriented x 3, normal affect    Consultant(s) Notes Reviewed:  [x ] YES  [ ] NO  Care Discussed with Consultants/Other Providers [ x] YES  [ ] NO-cardio, HF, EP    LABS:                        15.1   5.45  )-----------( 323      ( 05 Dec 2021 04:30 )             47.4     12-05    135  |  91<L>  |  31<H>  ----------------------------<  178<H>  3.8   |  26  |  1.0    Ca    9.2      05 Dec 2021 04:30  Mg     1.5     12-05    TPro  7.2  /  Alb  3.9  /  TBili  0.8  /  DBili  x   /  AST  20  /  ALT  13  /  AlkPhos  96  12-05      Serum Pro-Brain Natriuretic Peptide: 5736 pg/mL (11-29-21 @ 15:10)          RADIOLOGY & ADDITIONAL TESTS:  Imaging or report Personally Reviewed:  [ ] YES  [ ] NO    Telemetry reviewed independently - NSVT    Medications:  Standing  aspirin enteric coated 81 milliGRAM(s) Oral daily  atorvastatin 80 milliGRAM(s) Oral at bedtime  budesonide 160 MICROgram(s)/formoterol 4.5 MICROgram(s) Inhaler 2 Puff(s) Inhalation two times a day  chlorhexidine 4% Liquid 1 Application(s) Topical <User Schedule>  clopidogrel Tablet 75 milliGRAM(s) Oral daily  dextrose 40% Gel 15 Gram(s) Oral once  dextrose 5%. 1000 milliLiter(s) IV Continuous <Continuous>  dextrose 5%. 1000 milliLiter(s) IV Continuous <Continuous>  dextrose 50% Injectable 25 Gram(s) IV Push once  dextrose 50% Injectable 12.5 Gram(s) IV Push once  dextrose 50% Injectable 25 Gram(s) IV Push once  enoxaparin Injectable 40 milliGRAM(s) SubCutaneous daily  fenofibrate Tablet 145 milliGRAM(s) Oral daily  glucagon  Injectable 1 milliGRAM(s) IntraMuscular once  influenza   Vaccine 0.5 milliLiter(s) IntraMuscular once  insulin lispro (ADMELOG) corrective regimen sliding scale   SubCutaneous three times a day before meals  magnesium oxide 400 milliGRAM(s) Oral every 4 hours  metoprolol succinate ER 50 milliGRAM(s) Oral daily  pantoprazole    Tablet 40 milliGRAM(s) Oral before breakfast  QUEtiapine 25 milliGRAM(s) Oral at bedtime  sacubitril 49 mG/valsartan 51 mG 1 Tablet(s) Oral two times a day  spironolactone 25 milliGRAM(s) Oral daily  torsemide 20 milliGRAM(s) Oral daily    PRN Meds  ALBUTerol    90 MICROgram(s) HFA Inhaler 2 Puff(s) Inhalation every 6 hours PRN      Case discussed with resident  Care discussed with pt

## 2021-12-06 ENCOUNTER — TRANSCRIPTION ENCOUNTER (OUTPATIENT)
Age: 56
End: 2021-12-06

## 2021-12-06 VITALS — DIASTOLIC BLOOD PRESSURE: 69 MMHG | TEMPERATURE: 96 F | HEART RATE: 75 BPM | SYSTOLIC BLOOD PRESSURE: 123 MMHG

## 2021-12-06 LAB
ALBUMIN SERPL ELPH-MCNC: 3.7 G/DL — SIGNIFICANT CHANGE UP (ref 3.5–5.2)
ALP SERPL-CCNC: 79 U/L — SIGNIFICANT CHANGE UP (ref 30–115)
ALT FLD-CCNC: 13 U/L — SIGNIFICANT CHANGE UP (ref 0–41)
ANION GAP SERPL CALC-SCNC: 16 MMOL/L — HIGH (ref 7–14)
AST SERPL-CCNC: 23 U/L — SIGNIFICANT CHANGE UP (ref 0–41)
BASOPHILS # BLD AUTO: 0.09 K/UL — SIGNIFICANT CHANGE UP (ref 0–0.2)
BASOPHILS NFR BLD AUTO: 1.7 % — HIGH (ref 0–1)
BILIRUB SERPL-MCNC: 0.7 MG/DL — SIGNIFICANT CHANGE UP (ref 0.2–1.2)
BUN SERPL-MCNC: 31 MG/DL — HIGH (ref 10–20)
CALCIUM SERPL-MCNC: 9.4 MG/DL — SIGNIFICANT CHANGE UP (ref 8.5–10.1)
CHLORIDE SERPL-SCNC: 96 MMOL/L — LOW (ref 98–110)
CO2 SERPL-SCNC: 26 MMOL/L — SIGNIFICANT CHANGE UP (ref 17–32)
CREAT SERPL-MCNC: 1 MG/DL — SIGNIFICANT CHANGE UP (ref 0.7–1.5)
EOSINOPHIL # BLD AUTO: 0.15 K/UL — SIGNIFICANT CHANGE UP (ref 0–0.7)
EOSINOPHIL NFR BLD AUTO: 2.9 % — SIGNIFICANT CHANGE UP (ref 0–8)
GLUCOSE BLDC GLUCOMTR-MCNC: 104 MG/DL — HIGH (ref 70–99)
GLUCOSE BLDC GLUCOMTR-MCNC: 129 MG/DL — HIGH (ref 70–99)
GLUCOSE BLDC GLUCOMTR-MCNC: 135 MG/DL — HIGH (ref 70–99)
GLUCOSE BLDC GLUCOMTR-MCNC: 159 MG/DL — HIGH (ref 70–99)
GLUCOSE SERPL-MCNC: 122 MG/DL — HIGH (ref 70–99)
HCT VFR BLD CALC: 43.8 % — SIGNIFICANT CHANGE UP (ref 37–47)
HGB BLD-MCNC: 14.1 G/DL — SIGNIFICANT CHANGE UP (ref 12–16)
IMM GRANULOCYTES NFR BLD AUTO: 0.4 % — HIGH (ref 0.1–0.3)
LYMPHOCYTES # BLD AUTO: 1.76 K/UL — SIGNIFICANT CHANGE UP (ref 1.2–3.4)
LYMPHOCYTES # BLD AUTO: 33.5 % — SIGNIFICANT CHANGE UP (ref 20.5–51.1)
MAGNESIUM SERPL-MCNC: 1.7 MG/DL — LOW (ref 1.8–2.4)
MCHC RBC-ENTMCNC: 28.4 PG — SIGNIFICANT CHANGE UP (ref 27–31)
MCHC RBC-ENTMCNC: 32.2 G/DL — SIGNIFICANT CHANGE UP (ref 32–37)
MCV RBC AUTO: 88.1 FL — SIGNIFICANT CHANGE UP (ref 81–99)
MONOCYTES # BLD AUTO: 0.47 K/UL — SIGNIFICANT CHANGE UP (ref 0.1–0.6)
MONOCYTES NFR BLD AUTO: 9 % — SIGNIFICANT CHANGE UP (ref 1.7–9.3)
NEUTROPHILS # BLD AUTO: 2.76 K/UL — SIGNIFICANT CHANGE UP (ref 1.4–6.5)
NEUTROPHILS NFR BLD AUTO: 52.5 % — SIGNIFICANT CHANGE UP (ref 42.2–75.2)
NRBC # BLD: 0 /100 WBCS — SIGNIFICANT CHANGE UP (ref 0–0)
PLATELET # BLD AUTO: 281 K/UL — SIGNIFICANT CHANGE UP (ref 130–400)
POTASSIUM SERPL-MCNC: 4.2 MMOL/L — SIGNIFICANT CHANGE UP (ref 3.5–5)
POTASSIUM SERPL-SCNC: 4.2 MMOL/L — SIGNIFICANT CHANGE UP (ref 3.5–5)
PROT SERPL-MCNC: 7 G/DL — SIGNIFICANT CHANGE UP (ref 6–8)
RBC # BLD: 4.97 M/UL — SIGNIFICANT CHANGE UP (ref 4.2–5.4)
RBC # FLD: 16.4 % — HIGH (ref 11.5–14.5)
SODIUM SERPL-SCNC: 138 MMOL/L — SIGNIFICANT CHANGE UP (ref 135–146)
WBC # BLD: 5.25 K/UL — SIGNIFICANT CHANGE UP (ref 4.8–10.8)
WBC # FLD AUTO: 5.25 K/UL — SIGNIFICANT CHANGE UP (ref 4.8–10.8)

## 2021-12-06 PROCEDURE — 99239 HOSP IP/OBS DSCHRG MGMT >30: CPT

## 2021-12-06 RX ORDER — CLOPIDOGREL BISULFATE 75 MG/1
1 TABLET, FILM COATED ORAL
Qty: 30 | Refills: 0
Start: 2021-12-06

## 2021-12-06 RX ORDER — QUETIAPINE FUMARATE 200 MG/1
1 TABLET, FILM COATED ORAL
Qty: 30 | Refills: 0
Start: 2021-12-06

## 2021-12-06 RX ORDER — BUDESONIDE AND FORMOTEROL FUMARATE DIHYDRATE 160; 4.5 UG/1; UG/1
2 AEROSOL RESPIRATORY (INHALATION)
Qty: 3 | Refills: 0
Start: 2021-12-06 | End: 2022-01-04

## 2021-12-06 RX ORDER — ASPIRIN/CALCIUM CARB/MAGNESIUM 324 MG
1 TABLET ORAL
Qty: 30 | Refills: 0
Start: 2021-12-06

## 2021-12-06 RX ORDER — ASPIRIN/CALCIUM CARB/MAGNESIUM 324 MG
1 TABLET ORAL
Qty: 30 | Refills: 0
Start: 2021-12-06 | End: 2022-01-04

## 2021-12-06 RX ORDER — OMEGA-3 ACID ETHYL ESTERS 1 G
2 CAPSULE ORAL
Qty: 60 | Refills: 0
Start: 2021-12-06

## 2021-12-06 RX ORDER — SPIRONOLACTONE 25 MG/1
1 TABLET, FILM COATED ORAL
Qty: 30 | Refills: 3
Start: 2021-12-06 | End: 2022-04-04

## 2021-12-06 RX ORDER — METOPROLOL TARTRATE 50 MG
1 TABLET ORAL
Qty: 30 | Refills: 0
Start: 2021-12-06 | End: 2022-01-04

## 2021-12-06 RX ORDER — ERGOCALCIFEROL 1.25 MG/1
1 CAPSULE ORAL
Qty: 4 | Refills: 0
Start: 2021-12-06 | End: 2022-01-04

## 2021-12-06 RX ORDER — ALBUTEROL 90 UG/1
2 AEROSOL, METERED ORAL
Qty: 0 | Refills: 0 | DISCHARGE

## 2021-12-06 RX ORDER — ATORVASTATIN CALCIUM 80 MG/1
1 TABLET, FILM COATED ORAL
Qty: 30 | Refills: 0
Start: 2021-12-06 | End: 2022-01-04

## 2021-12-06 RX ORDER — METFORMIN HYDROCHLORIDE 850 MG/1
1 TABLET ORAL
Qty: 0 | Refills: 0 | DISCHARGE

## 2021-12-06 RX ORDER — OMEGA-3 ACID ETHYL ESTERS 1 G
2 CAPSULE ORAL
Qty: 0 | Refills: 0 | DISCHARGE

## 2021-12-06 RX ORDER — ERGOCALCIFEROL 1.25 MG/1
1 CAPSULE ORAL
Qty: 30 | Refills: 0
Start: 2021-12-06

## 2021-12-06 RX ORDER — FENOFIBRATE,MICRONIZED 130 MG
1 CAPSULE ORAL
Qty: 0 | Refills: 0 | DISCHARGE

## 2021-12-06 RX ORDER — MAGNESIUM OXIDE 400 MG ORAL TABLET 241.3 MG
1 TABLET ORAL
Qty: 60 | Refills: 0
Start: 2021-12-06

## 2021-12-06 RX ORDER — MAGNESIUM SULFATE 500 MG/ML
2 VIAL (ML) INJECTION ONCE
Refills: 0 | Status: COMPLETED | OUTPATIENT
Start: 2021-12-06 | End: 2021-12-06

## 2021-12-06 RX ORDER — FENOFIBRATE,MICRONIZED 130 MG
1 CAPSULE ORAL
Qty: 30 | Refills: 0
Start: 2021-12-06

## 2021-12-06 RX ORDER — CLOPIDOGREL BISULFATE 75 MG/1
1 TABLET, FILM COATED ORAL
Qty: 0 | Refills: 0 | DISCHARGE

## 2021-12-06 RX ORDER — ERGOCALCIFEROL 1.25 MG/1
1 CAPSULE ORAL
Qty: 0 | Refills: 0 | DISCHARGE

## 2021-12-06 RX ORDER — BUDESONIDE AND FORMOTEROL FUMARATE DIHYDRATE 160; 4.5 UG/1; UG/1
2 AEROSOL RESPIRATORY (INHALATION)
Qty: 3 | Refills: 0 | DISCHARGE
Start: 2021-12-06 | End: 2022-01-04

## 2021-12-06 RX ORDER — MAGNESIUM OXIDE 400 MG ORAL TABLET 241.3 MG
400 TABLET ORAL
Refills: 0 | Status: DISCONTINUED | OUTPATIENT
Start: 2021-12-06 | End: 2021-12-07

## 2021-12-06 RX ORDER — ALBUTEROL 90 UG/1
2 AEROSOL, METERED ORAL
Qty: 1 | Refills: 0
Start: 2021-12-06

## 2021-12-06 RX ORDER — ASPIRIN/CALCIUM CARB/MAGNESIUM 324 MG
1 TABLET ORAL
Qty: 0 | Refills: 0 | DISCHARGE

## 2021-12-06 RX ORDER — METFORMIN HYDROCHLORIDE 850 MG/1
1 TABLET ORAL
Qty: 60 | Refills: 0
Start: 2021-12-06

## 2021-12-06 RX ADMIN — SACUBITRIL AND VALSARTAN 1 TABLET(S): 24; 26 TABLET, FILM COATED ORAL at 17:23

## 2021-12-06 RX ADMIN — Medication 2: at 12:00

## 2021-12-06 RX ADMIN — CLOPIDOGREL BISULFATE 75 MILLIGRAM(S): 75 TABLET, FILM COATED ORAL at 12:04

## 2021-12-06 RX ADMIN — ATORVASTATIN CALCIUM 80 MILLIGRAM(S): 80 TABLET, FILM COATED ORAL at 21:34

## 2021-12-06 RX ADMIN — ENOXAPARIN SODIUM 40 MILLIGRAM(S): 100 INJECTION SUBCUTANEOUS at 12:05

## 2021-12-06 RX ADMIN — QUETIAPINE FUMARATE 25 MILLIGRAM(S): 200 TABLET, FILM COATED ORAL at 21:34

## 2021-12-06 RX ADMIN — SACUBITRIL AND VALSARTAN 1 TABLET(S): 24; 26 TABLET, FILM COATED ORAL at 05:26

## 2021-12-06 RX ADMIN — PANTOPRAZOLE SODIUM 40 MILLIGRAM(S): 20 TABLET, DELAYED RELEASE ORAL at 05:25

## 2021-12-06 RX ADMIN — CHLORHEXIDINE GLUCONATE 1 APPLICATION(S): 213 SOLUTION TOPICAL at 05:26

## 2021-12-06 RX ADMIN — MAGNESIUM OXIDE 400 MG ORAL TABLET 400 MILLIGRAM(S): 241.3 TABLET ORAL at 17:24

## 2021-12-06 RX ADMIN — SPIRONOLACTONE 25 MILLIGRAM(S): 25 TABLET, FILM COATED ORAL at 05:26

## 2021-12-06 RX ADMIN — Medication 50 MILLIGRAM(S): at 05:26

## 2021-12-06 RX ADMIN — BUDESONIDE AND FORMOTEROL FUMARATE DIHYDRATE 2 PUFF(S): 160; 4.5 AEROSOL RESPIRATORY (INHALATION) at 11:37

## 2021-12-06 RX ADMIN — Medication 145 MILLIGRAM(S): at 12:03

## 2021-12-06 RX ADMIN — MAGNESIUM OXIDE 400 MG ORAL TABLET 400 MILLIGRAM(S): 241.3 TABLET ORAL at 12:05

## 2021-12-06 RX ADMIN — Medication 20 MILLIGRAM(S): at 05:26

## 2021-12-06 RX ADMIN — Medication 81 MILLIGRAM(S): at 12:04

## 2021-12-06 NOTE — DISCHARGE NOTE PROVIDER - CARE PROVIDER_API CALL
Nick Day; SHOSHANA)  [facility]  [address] [place] [address]  Phone: [phone]  Fax: [phone]  Follow Up Time: Routine    [facility]  INTERNAL MEDICINE  [address] [place] [address]  Phone: [phone]  Fax: [phone]  Established Patient  Follow Up Time: 2 weeks

## 2021-12-06 NOTE — DISCHARGE NOTE PROVIDER - HOSPITAL COURSE
56 year old female patient with multiple comorbidities including COPD on home O2, HFrEF, HTN, DL/CVA with residual left LE weakness, and DMII who presented on 11/29 for evaluation of an episode of desaturation after her O2 ran off at her PCP's clinic, found to have elevated BNP on labs and evidence of congestion on imaging, admitted for management of acute on chronic HFrEF decompensation.     Hospital Course:    -TTE 11/14: EF 15-20%, DDII, WMA, mod MR, mild-mod TR, severe PHTN, small pericardial effusion. (EF down from 35% 6/2021)  -CXR with evidence of congestion  -Pt was evaluated by Primary, HF, Cardiology and EP team  -Has been on Entresto 49/51mg BID, Atorvastatin 80 mg QD and Fenofibrate 145mg QD, Aspirin 81mg QD and Plavix 75mg QD, spiranolactone   -s/p Bumex gtt at 1 mg/hr and 3% Hypertonic Saline 150ml BID  -Pt achieved Euvolemia, As per discussion with primary and HF team Bumex gtt stopped and Torsemide 20 mg daily started 12/4  -Has been having net negative balance i's and o's  -SAO2 and O2 requirements: currently home requirements 4L  -Pt's acute drop in EF discussed with EP, cardio, HF, suspect EF from 6/2021 was lower than read but pt still candidate for lifevest.  -patient will benefit for wearable defibrilator for primary prevention, will be d/c on Lifevest.

## 2021-12-06 NOTE — DISCHARGE NOTE PROVIDER - CARE PROVIDERS DIRECT ADDRESSES
[email or web address]

## 2021-12-06 NOTE — DISCHARGE NOTE PROVIDER - NSDCFUADDINST_GEN_ALL_CORE_FT
Please follow up with Dr. Day (Electrophysiologist) in 3 months. Contact info is given to you  Please wear your Lifevest as instructed

## 2021-12-06 NOTE — DISCHARGE NOTE PROVIDER - NSDCCAREPROVSEEN_GEN_ALL_CORE_FT
Dr. Trammell Suturegard Intro: Intraoperative tissue expansion was performed, utilizing the SUTUREGARD device, in order to reduce wound tension.

## 2021-12-06 NOTE — DISCHARGE NOTE NURSING/CASE MANAGEMENT/SOCIAL WORK - PATIENT PORTAL LINK FT
You can access the FollowMyHealth Patient Portal offered by [facility] by registering at the following website: [email or web address]. By joining [employer]’s FollowMyHealth portal, you will also be able to view your health information using other applications (apps) compatible with our system.

## 2021-12-06 NOTE — CHART NOTE - NSCHARTNOTEFT_GEN_A_CORE
Pt seen and examined at bedside. Received lifevest. Stable for dc home.     PHYSICAL EXAM:  GENERAL: NAD, speaks in full sentences, no signs of respiratory distress  HEAD:  Atraumatic, Normocephalic  EYES: Anicteric  NECK: Supple, No JVD  CHEST/LUNG: Clear to auscultation bilaterally; No wheeze; No crackles; No accessory muscles used  HEART: Regular rate and rhythm; No murmurs;   ABDOMEN: Soft, Nontender, Nondistended; Bowel sounds present; No guarding  EXTREMITIES:  2+ Peripheral Pulses, No cyanosis or edema  PSYCH: AAOx3  NEUROLOGY: non-focal  SKIN: No rashes or lesions    Time spent coordinating discharge 34 minutes

## 2021-12-06 NOTE — DISCHARGE NOTE PROVIDER - NSDCCPCAREPLAN_GEN_ALL_CORE_FT
PRINCIPAL DISCHARGE DIAGNOSIS  Diagnosis: Acute on chronic systolic congestive heart failure  Assessment and Plan of Treatment: You came to the hospital for evaluation of shortness of breath. You were found to be in Heart failure exacerbation. It was noted that you never recieved your medications at your pharmacy from last admission. You were evaluated by primary team, Heart failure, Cardiology, Electrophysiology team and you were treated with Diuretics IV and Infusuion. You achieved Euvolemia and your oxygen saturation came back to your baseline. You had echocardiogram done which revealed lowere ejaction fraction compared to your echo from 06/2021. For that Electrophysiology recommended to be discharge on wearable defibrilator to prevent Cardiac arrhythmia,   It is important that you continue to take your medications as prescribed. Please follow up with your PCP, Cardiologist within 2 weeks of discharge. Please follow up Electrophysiologist Dr. Day in 3 months to re-evalute your heart function.  Congestive Heart Failure (CHF)  Congestive heart failure is a chronic condition in which the heart has trouble pumping blood. In some cases of heart failure, fluid may back up into your lungs or you may have swelling (edema) in your lower legs. There are many causes of heart failure including high blood pressure, coronary artery disease, abnormal heart valves, heart muscle disease, lung disease, diabetes, etc. Symptoms include shortness of breath with activity or when lying flat, cough, swelling of the legs, fatigue, or increased urination during the night.   Treatment is aimed at managing the symptoms of heart failureTake medicines only as directed by your health care provider and do not stop unless instructed to do so. Eat heart-healthy foods with low or no trans/saturated fats, cholesterol and salt. Weigh yourself every day for early recognition of fluid accumulation.  SEEK IMMEDIATE MEDICAL CARE IF YOU HAVE ANY OF THE FOLLOWING SYMPTOMS: shortness of breath, change in mental status, chest pain, lightheadedness/dizziness/fainting, or worsening of symptoms including not being able to conduct almas

## 2021-12-06 NOTE — DISCHARGE NOTE PROVIDER - PROVIDER TOKENS
PROVIDER:[TOKEN:[[id number]],FOLLOWUP:[Routine]],PROVIDER:[TOKEN:[[id number]],FOLLOWUP:[2 weeks],ESTABLISHEDPATIENT:[T]]

## 2021-12-06 NOTE — DISCHARGE NOTE NURSING/CASE MANAGEMENT/SOCIAL WORK - NSDCPEFALRISK_GEN_ALL_CORE
For information on Fall & Injury Prevention, visit: [email or web address] OR  [email or web address] OR  https://www.cdc.gov/steadi/patient.html

## 2021-12-10 DIAGNOSIS — I25.10 ATHEROSCLEROTIC HEART DISEASE OF NATIVE CORONARY ARTERY WITHOUT ANGINA PECTORIS: ICD-10-CM

## 2021-12-10 DIAGNOSIS — J44.9 CHRONIC OBSTRUCTIVE PULMONARY DISEASE, UNSPECIFIED: ICD-10-CM

## 2021-12-10 DIAGNOSIS — Z99.3 DEPENDENCE ON WHEELCHAIR: ICD-10-CM

## 2021-12-10 DIAGNOSIS — I13.0 HYPERTENSIVE HEART AND CHRONIC KIDNEY DISEASE WITH HEART FAILURE AND STAGE 1 THROUGH STAGE 4 CHRONIC KIDNEY DISEASE, OR UNSPECIFIED CHRONIC KIDNEY DISEASE: ICD-10-CM

## 2021-12-10 DIAGNOSIS — E11.22 TYPE 2 DIABETES MELLITUS WITH DIABETIC CHRONIC KIDNEY DISEASE: ICD-10-CM

## 2021-12-10 DIAGNOSIS — F32.9 MAJOR DEPRESSIVE DISORDER, SINGLE EPISODE, UNSPECIFIED: ICD-10-CM

## 2021-12-10 DIAGNOSIS — I69.354 HEMIPLEGIA AND HEMIPARESIS FOLLOWING CEREBRAL INFARCTION AFFECTING LEFT NON-DOMINANT SIDE: ICD-10-CM

## 2021-12-10 DIAGNOSIS — N18.2 CHRONIC KIDNEY DISEASE, STAGE 2 (MILD): ICD-10-CM

## 2021-12-10 DIAGNOSIS — E78.00 PURE HYPERCHOLESTEROLEMIA, UNSPECIFIED: ICD-10-CM

## 2021-12-10 DIAGNOSIS — Z99.81 DEPENDENCE ON SUPPLEMENTAL OXYGEN: ICD-10-CM

## 2021-12-10 DIAGNOSIS — F41.9 ANXIETY DISORDER, UNSPECIFIED: ICD-10-CM

## 2021-12-10 DIAGNOSIS — I25.2 OLD MYOCARDIAL INFARCTION: ICD-10-CM

## 2021-12-10 DIAGNOSIS — J96.01 ACUTE RESPIRATORY FAILURE WITH HYPOXIA: ICD-10-CM

## 2021-12-10 DIAGNOSIS — I50.23 ACUTE ON CHRONIC SYSTOLIC (CONGESTIVE) HEART FAILURE: ICD-10-CM

## 2021-12-10 DIAGNOSIS — Z95.5 PRESENCE OF CORONARY ANGIOPLASTY IMPLANT AND GRAFT: ICD-10-CM

## 2022-01-10 ENCOUNTER — INPATIENT (INPATIENT)
Facility: HOSPITAL | Age: 57
LOS: 4 days | Discharge: HOME HEALTH | End: 2022-01-15
Attending: INTERNAL MEDICINE | Admitting: INTERNAL MEDICINE
Payer: MEDICARE

## 2022-01-10 VITALS
TEMPERATURE: 98 F | SYSTOLIC BLOOD PRESSURE: 129 MMHG | WEIGHT: 184.97 LBS | HEIGHT: 57 IN | HEART RATE: 88 BPM | RESPIRATION RATE: 20 BRPM | OXYGEN SATURATION: 100 % | DIASTOLIC BLOOD PRESSURE: 80 MMHG

## 2022-01-10 LAB
ALBUMIN SERPL ELPH-MCNC: 3.4 G/DL — LOW (ref 3.5–5.2)
ALP SERPL-CCNC: 68 U/L — SIGNIFICANT CHANGE UP (ref 30–115)
ALT FLD-CCNC: 8 U/L — SIGNIFICANT CHANGE UP (ref 0–41)
ANION GAP SERPL CALC-SCNC: 16 MMOL/L — HIGH (ref 7–14)
AST SERPL-CCNC: 20 U/L — SIGNIFICANT CHANGE UP (ref 0–41)
BASOPHILS # BLD AUTO: 0.04 K/UL — SIGNIFICANT CHANGE UP (ref 0–0.2)
BASOPHILS NFR BLD AUTO: 0.8 % — SIGNIFICANT CHANGE UP (ref 0–1)
BILIRUB SERPL-MCNC: 1.3 MG/DL — HIGH (ref 0.2–1.2)
BUN SERPL-MCNC: 9 MG/DL — LOW (ref 10–20)
CALCIUM SERPL-MCNC: 8.7 MG/DL — SIGNIFICANT CHANGE UP (ref 8.5–10.1)
CHLORIDE SERPL-SCNC: 102 MMOL/L — SIGNIFICANT CHANGE UP (ref 98–110)
CO2 SERPL-SCNC: 19 MMOL/L — SIGNIFICANT CHANGE UP (ref 17–32)
CREAT SERPL-MCNC: 0.9 MG/DL — SIGNIFICANT CHANGE UP (ref 0.7–1.5)
EOSINOPHIL # BLD AUTO: 0.06 K/UL — SIGNIFICANT CHANGE UP (ref 0–0.7)
EOSINOPHIL NFR BLD AUTO: 1.1 % — SIGNIFICANT CHANGE UP (ref 0–8)
GLUCOSE SERPL-MCNC: 175 MG/DL — HIGH (ref 70–99)
HCT VFR BLD CALC: 44.5 % — SIGNIFICANT CHANGE UP (ref 37–47)
HGB BLD-MCNC: 13.9 G/DL — SIGNIFICANT CHANGE UP (ref 12–16)
IMM GRANULOCYTES NFR BLD AUTO: 0.2 % — SIGNIFICANT CHANGE UP (ref 0.1–0.3)
LYMPHOCYTES # BLD AUTO: 1.35 K/UL — SIGNIFICANT CHANGE UP (ref 1.2–3.4)
LYMPHOCYTES # BLD AUTO: 25.5 % — SIGNIFICANT CHANGE UP (ref 20.5–51.1)
MCHC RBC-ENTMCNC: 27.7 PG — SIGNIFICANT CHANGE UP (ref 27–31)
MCHC RBC-ENTMCNC: 31.2 G/DL — LOW (ref 32–37)
MCV RBC AUTO: 88.8 FL — SIGNIFICANT CHANGE UP (ref 81–99)
MONOCYTES # BLD AUTO: 0.17 K/UL — SIGNIFICANT CHANGE UP (ref 0.1–0.6)
MONOCYTES NFR BLD AUTO: 3.2 % — SIGNIFICANT CHANGE UP (ref 1.7–9.3)
NEUTROPHILS # BLD AUTO: 3.67 K/UL — SIGNIFICANT CHANGE UP (ref 1.4–6.5)
NEUTROPHILS NFR BLD AUTO: 69.2 % — SIGNIFICANT CHANGE UP (ref 42.2–75.2)
NRBC # BLD: 0 /100 WBCS — SIGNIFICANT CHANGE UP (ref 0–0)
NT-PROBNP SERPL-SCNC: HIGH PG/ML (ref 0–300)
PLATELET # BLD AUTO: 314 K/UL — SIGNIFICANT CHANGE UP (ref 130–400)
POTASSIUM SERPL-MCNC: 3.8 MMOL/L — SIGNIFICANT CHANGE UP (ref 3.5–5)
POTASSIUM SERPL-SCNC: 3.8 MMOL/L — SIGNIFICANT CHANGE UP (ref 3.5–5)
PROT SERPL-MCNC: 6.4 G/DL — SIGNIFICANT CHANGE UP (ref 6–8)
RBC # BLD: 5.01 M/UL — SIGNIFICANT CHANGE UP (ref 4.2–5.4)
RBC # FLD: 20.6 % — HIGH (ref 11.5–14.5)
SARS-COV-2 RNA SPEC QL NAA+PROBE: DETECTED
SODIUM SERPL-SCNC: 137 MMOL/L — SIGNIFICANT CHANGE UP (ref 135–146)
TROPONIN T SERPL-MCNC: 0.02 NG/ML — HIGH
WBC # BLD: 5.3 K/UL — SIGNIFICANT CHANGE UP (ref 4.8–10.8)
WBC # FLD AUTO: 5.3 K/UL — SIGNIFICANT CHANGE UP (ref 4.8–10.8)

## 2022-01-10 PROCEDURE — 93010 ELECTROCARDIOGRAM REPORT: CPT | Mod: 77

## 2022-01-10 PROCEDURE — 71045 X-RAY EXAM CHEST 1 VIEW: CPT | Mod: 26

## 2022-01-10 PROCEDURE — 93010 ELECTROCARDIOGRAM REPORT: CPT

## 2022-01-10 PROCEDURE — 99285 EMERGENCY DEPT VISIT HI MDM: CPT

## 2022-01-10 PROCEDURE — 93971 EXTREMITY STUDY: CPT | Mod: 26,LT

## 2022-01-10 RX ORDER — FUROSEMIDE 40 MG
40 TABLET ORAL ONCE
Refills: 0 | Status: COMPLETED | OUTPATIENT
Start: 2022-01-10 | End: 2022-01-10

## 2022-01-10 RX ADMIN — Medication 40 MILLIGRAM(S): at 20:38

## 2022-01-10 NOTE — ED ADULT TRIAGE NOTE - CHIEF COMPLAINT QUOTE
patient reports left arm and leg swelling - + pulses - patient also hypoxic to 90% on 5l nc patient normally on 4l nc saturating 100 person

## 2022-01-10 NOTE — ED PROVIDER NOTE - ATTENDING CONTRIBUTION TO CARE
57 yo female PMH multiple medical problems including CVA with residual left sided weakness, wheelchair bound,  CKD, DM, CHF, COPD on home oxygen at 4 L,  HTN, HLD, anxiety/depression c/o swelling of the left side of her body and increased oxygen requirement for 2 days.  Denies acute cough, fever, chills, CP, abdominal pain, no change in urination, no diarrhea, black or bloody stools. 55 yo female PMH multiple medical problems including CVA with residual left sided weakness, wheelchair bound,  CKD, DM, CHF, COPD on home oxygen at 4 L,  HTN, HLD, anxiety/depression c/o swelling of the left side of her body and increased oxygen requirement for 2 days.  Denies acute cough, fever, chills, CP, abdominal pain, no change in urination, no diarrhea, black or bloody stools.  Middle aged female appears older than stated age,  well-nourished, NAD, head AT/NC, PERRL, pink conjunctivae,  mmm, supple neck, no acute respiratory distress equal air entry, basilar inspiratory crackles,  RRR, well-perfused extremities, distal pulses intact, abdomen obese soft, NT/ND, BS present in all quadrants, + pitting edema of the left upper and lower extremities, A&Ox3, decreased strenght on the left upper and lower extremities, nml mood and affect.  Labs, CXR, duplex r/o DVT, though clinical picture more consistent with fluid overload.

## 2022-01-10 NOTE — ED ADULT NURSE NOTE - TEMPLATE LIST FOR HEAD TO TOE ASSESSMENT
89 yo female from home, walks with cane/walker with PMHx of HLD, HTN, stroke, dementia, frequent UTIs, sent to ED by daughter for AMS today. Patient states she was confused today and couldn't find her bedroom even though she has lived in that house for over 50 years.  Furthermore patient  lives with two daughters but they work so patient  is often home alone for several days a week with no home attendant. Daughter states patient has also had increased urine output over the past few days.Patient denies any specific complaints including fever, fall, diarrhea, dizziness, dysuria, chest pain, palpitations, cough or any other acute complaints.     In ED, /75, HR 77 General

## 2022-01-10 NOTE — ED PROVIDER NOTE - NS ED ROS FT
Constitutional: (-) fever (-) malaise (-) diaphoresis (-) chills (-) wt. loss (-) bodyaches (-) night sweats  Eyes: (-) visual changes (-) eye pain (-) eye discharge (-) photophobia (-) FB sensation  ENMT: (-) nasal or chest congestion (-) runny nose (-) sore throat (-) hoarseness  (-) hearing changes (-) ear pain (-) ear discharge or infections (-) neck pain (-) neck stiffness  Cardiac: (-) chest pain  (-) palpitations (-) syncope (-) edema  Respiratory: (-) cough (+) SOB (-) ORR  GI: (-) nausea (-) vomiting (-) diarrhea (-) abdominal pain   Neuro: (-) headache (-) dizziness (-) numbness/tingling to extremities B/L (-) weakness  Skin: (-) rash (-) laceration  EXT: (+)LUE swelling (+)LLE swelling  Except as documented in the HPI, all other systems are negative.

## 2022-01-10 NOTE — ED PROVIDER NOTE - CARE PLAN
1 Principal Discharge DX:	Acute pulmonary edema  Secondary Diagnosis:	Fluid overload  Secondary Diagnosis:	Shortness of breath  Secondary Diagnosis:	COVID

## 2022-01-10 NOTE — ED PROVIDER NOTE - OBJECTIVE STATEMENT
55 yo F pmhx copd 4LNC, HTN, cvd, dm presenting to the ED for evaluation of LUE and LLE swelling x 2 days. Pt reports associated baseline sob. Denies any alleviating or provoking factors. Pt states she has been compliant with all of her medications. Denies any fever, chills, nausea, vomiting, abd pain, chest pain.

## 2022-01-10 NOTE — ED PROVIDER NOTE - PROGRESS NOTE DETAILS
NC: duplex negative EP: patient reports feeling well; monitor shows tachycardia,  CTA chest r/o PE was ordered, ( she is mostly immobile).   Endorsed to Dr Velasquez to follow up CTA chest and admit.

## 2022-01-10 NOTE — ED PROVIDER NOTE - PHYSICAL EXAMINATION
GENERAL: Well-nourished, Well-developed. NAD.  HEAD: No visible or palpable bumps or hematomas. No ecchymosis behind ears B/L.  Eyes: PERRLA, EOMI. No asymmetry. No nystagmus. No conjunctival injection. Non-icteric sclera.  ENMT: MMM.   Neck: Supple. FROM  CVS: No JVD. No reproducible chest wall tenderness. Normal S1,S2. No murmurs appreciated on auscultation   RESP: No use of accessory muscles. Chest rise symmetrical with good expansion. (+)B/L basilar crackles  GI: Normal auscultation of bowel sounds in all 4 quadrants. Soft, Nontender, Nondistended. No guarding or rebound tenderness. No CVAT B/L.  Skin: Warm, Dry. No rashes or lesions. Good cap refill < 2 sec B/L.  EXT: Radial and pedal pulses present B/L. No calf tenderness B/L. (+)LUE and LLE pitting edema.

## 2022-01-11 LAB
D DIMER BLD IA.RAPID-MCNC: 453 NG/ML DDU — HIGH (ref 0–230)
GLUCOSE BLDC GLUCOMTR-MCNC: 113 MG/DL — HIGH (ref 70–99)
GLUCOSE BLDC GLUCOMTR-MCNC: 137 MG/DL — HIGH (ref 70–99)
GLUCOSE BLDC GLUCOMTR-MCNC: 155 MG/DL — HIGH (ref 70–99)
GLUCOSE BLDC GLUCOMTR-MCNC: 271 MG/DL — HIGH (ref 70–99)
TROPONIN T SERPL-MCNC: 0.02 NG/ML — HIGH

## 2022-01-11 PROCEDURE — 71275 CT ANGIOGRAPHY CHEST: CPT | Mod: 26,MA

## 2022-01-11 PROCEDURE — 99223 1ST HOSP IP/OBS HIGH 75: CPT

## 2022-01-11 RX ORDER — CHLORHEXIDINE GLUCONATE 213 G/1000ML
1 SOLUTION TOPICAL
Refills: 0 | Status: DISCONTINUED | OUTPATIENT
Start: 2022-01-11 | End: 2022-01-15

## 2022-01-11 RX ORDER — ACETAMINOPHEN 500 MG
650 TABLET ORAL EVERY 6 HOURS
Refills: 0 | Status: DISCONTINUED | OUTPATIENT
Start: 2022-01-11 | End: 2022-01-15

## 2022-01-11 RX ORDER — LANOLIN ALCOHOL/MO/W.PET/CERES
3 CREAM (GRAM) TOPICAL AT BEDTIME
Refills: 0 | Status: DISCONTINUED | OUTPATIENT
Start: 2022-01-11 | End: 2022-01-15

## 2022-01-11 RX ORDER — BUDESONIDE AND FORMOTEROL FUMARATE DIHYDRATE 160; 4.5 UG/1; UG/1
2 AEROSOL RESPIRATORY (INHALATION)
Refills: 0 | Status: DISCONTINUED | OUTPATIENT
Start: 2022-01-11 | End: 2022-01-15

## 2022-01-11 RX ORDER — METOPROLOL TARTRATE 50 MG
50 TABLET ORAL DAILY
Refills: 0 | Status: DISCONTINUED | OUTPATIENT
Start: 2022-01-11 | End: 2022-01-15

## 2022-01-11 RX ORDER — ASPIRIN/CALCIUM CARB/MAGNESIUM 324 MG
81 TABLET ORAL DAILY
Refills: 0 | Status: DISCONTINUED | OUTPATIENT
Start: 2022-01-11 | End: 2022-01-15

## 2022-01-11 RX ORDER — FENOFIBRATE,MICRONIZED 130 MG
145 CAPSULE ORAL DAILY
Refills: 0 | Status: DISCONTINUED | OUTPATIENT
Start: 2022-01-11 | End: 2022-01-15

## 2022-01-11 RX ORDER — SACUBITRIL AND VALSARTAN 24; 26 MG/1; MG/1
1 TABLET, FILM COATED ORAL
Refills: 0 | Status: DISCONTINUED | OUTPATIENT
Start: 2022-01-11 | End: 2022-01-15

## 2022-01-11 RX ORDER — QUETIAPINE FUMARATE 200 MG/1
25 TABLET, FILM COATED ORAL AT BEDTIME
Refills: 0 | Status: DISCONTINUED | OUTPATIENT
Start: 2022-01-11 | End: 2022-01-15

## 2022-01-11 RX ORDER — ENOXAPARIN SODIUM 100 MG/ML
40 INJECTION SUBCUTANEOUS DAILY
Refills: 0 | Status: DISCONTINUED | OUTPATIENT
Start: 2022-01-11 | End: 2022-01-15

## 2022-01-11 RX ORDER — INFLUENZA VIRUS VACCINE 15; 15; 15; 15 UG/.5ML; UG/.5ML; UG/.5ML; UG/.5ML
0.5 SUSPENSION INTRAMUSCULAR ONCE
Refills: 0 | Status: DISCONTINUED | OUTPATIENT
Start: 2022-01-11 | End: 2022-01-15

## 2022-01-11 RX ORDER — PANTOPRAZOLE SODIUM 20 MG/1
40 TABLET, DELAYED RELEASE ORAL
Refills: 0 | Status: DISCONTINUED | OUTPATIENT
Start: 2022-01-11 | End: 2022-01-15

## 2022-01-11 RX ORDER — SPIRONOLACTONE 25 MG/1
25 TABLET, FILM COATED ORAL DAILY
Refills: 0 | Status: DISCONTINUED | OUTPATIENT
Start: 2022-01-11 | End: 2022-01-15

## 2022-01-11 RX ORDER — ALBUTEROL 90 UG/1
2 AEROSOL, METERED ORAL EVERY 6 HOURS
Refills: 0 | Status: DISCONTINUED | OUTPATIENT
Start: 2022-01-11 | End: 2022-01-15

## 2022-01-11 RX ORDER — ATORVASTATIN CALCIUM 80 MG/1
80 TABLET, FILM COATED ORAL AT BEDTIME
Refills: 0 | Status: DISCONTINUED | OUTPATIENT
Start: 2022-01-11 | End: 2022-01-15

## 2022-01-11 RX ORDER — CLOPIDOGREL BISULFATE 75 MG/1
75 TABLET, FILM COATED ORAL DAILY
Refills: 0 | Status: DISCONTINUED | OUTPATIENT
Start: 2022-01-11 | End: 2022-01-15

## 2022-01-11 RX ORDER — FUROSEMIDE 40 MG
40 TABLET ORAL
Refills: 0 | Status: DISCONTINUED | OUTPATIENT
Start: 2022-01-11 | End: 2022-01-12

## 2022-01-11 RX ORDER — ONDANSETRON 8 MG/1
4 TABLET, FILM COATED ORAL EVERY 8 HOURS
Refills: 0 | Status: DISCONTINUED | OUTPATIENT
Start: 2022-01-11 | End: 2022-01-15

## 2022-01-11 RX ADMIN — Medication 145 MILLIGRAM(S): at 12:45

## 2022-01-11 RX ADMIN — CLOPIDOGREL BISULFATE 75 MILLIGRAM(S): 75 TABLET, FILM COATED ORAL at 11:51

## 2022-01-11 RX ADMIN — ENOXAPARIN SODIUM 40 MILLIGRAM(S): 100 INJECTION SUBCUTANEOUS at 11:52

## 2022-01-11 RX ADMIN — ATORVASTATIN CALCIUM 80 MILLIGRAM(S): 80 TABLET, FILM COATED ORAL at 22:26

## 2022-01-11 RX ADMIN — PANTOPRAZOLE SODIUM 40 MILLIGRAM(S): 20 TABLET, DELAYED RELEASE ORAL at 11:51

## 2022-01-11 RX ADMIN — Medication 50 MILLIGRAM(S): at 11:51

## 2022-01-11 RX ADMIN — QUETIAPINE FUMARATE 25 MILLIGRAM(S): 200 TABLET, FILM COATED ORAL at 22:26

## 2022-01-11 RX ADMIN — Medication 40 MILLIGRAM(S): at 17:28

## 2022-01-11 RX ADMIN — BUDESONIDE AND FORMOTEROL FUMARATE DIHYDRATE 2 PUFF(S): 160; 4.5 AEROSOL RESPIRATORY (INHALATION) at 22:26

## 2022-01-11 RX ADMIN — SACUBITRIL AND VALSARTAN 1 TABLET(S): 24; 26 TABLET, FILM COATED ORAL at 17:28

## 2022-01-11 RX ADMIN — Medication 81 MILLIGRAM(S): at 11:51

## 2022-01-11 RX ADMIN — SPIRONOLACTONE 25 MILLIGRAM(S): 25 TABLET, FILM COATED ORAL at 11:52

## 2022-01-11 NOTE — H&P ADULT - ASSESSMENT
56 year old female patient with multiple comorbidities including COPD on home O2, CORNELIUS on CPAP, HFrEF (15-20%) likely non ischemic as per cardio note last admission, sever pulmonary htn, CAD s/p PCI to RCA, HTN, DL/CVA with residual left LE weakness, and DMII, active smoker, with left lower ext swelling for 2 days.    #Left lower ext swelling likely from HFrEF acute on chronic decompensation,   - recurrent admission for HF  - Uses life vest at home   -TTE 11/14 EF 15-20%, DDII, WMA, mod MR, mild-mod TR, severe PHTN, small pericardial effusion  -strict I and O chart  -daily weight, fluid restriction  -continue toprol XL, entresto, spironolactone  -on 20 toresimide at home, IV lasix 40 BID inpatient       #COVID positive   - CT scan showed ground glass opacities  - F/u inflammatory markers   - hold on steroids for now    #CAD s/p RCA PCI, unlikely cause of HF as per cardio last admission  - Aspirin, plavix, metoprolol     #COPD sever chronic hypoxemic respiratory failure on 5 L at home- not in excerbation  #Sever Phtn  - likely due to volume overload  - now on 4 L sat 96%- will taper down goal of pulse ox 89-92%  -nebs/inhalers tx.     #CORNELIUS on cpap    CVA/Dyslipidemia-   stable - continue atrovatatin, aspirin    DM 2   - Hold PO medications  - controlled  - c/w insulin    Diet DASH/TLC  GI ppx PPI  dVT proph- lovenox subc. tx.  code status: full code     56 year old female patient with multiple comorbidities including COPD on home O2, CORNELIUS on CPAP, HFrEF (15-20%) likely non ischemic as per cardio note last admission, sever pulmonary htn, CAD s/p PCI to RCA, HTN, DL/CVA with residual left LE weakness, and DMII, active smoker, with left lower ext swelling for 2 days.    #Left lower ext swelling likely from HFrEF acute on chronic decompensation,   - recurrent admission for HF  - Uses life vest at home   -TTE 11/14 EF 15-20%, DDII, WMA, mod MR, mild-mod TR, severe PHTN, small pericardial effusion  - BNP elevated, PE and imaging consistent with CHF excerbation, gained around 8kg since last admission   -strict I and O chart  -daily weight, fluid restriction  -continue toprol XL, entresto, spironolactone  -on 20 toresimide at home, IV lasix 40 BID inpatient       #COVID positive   - CT scan showed ground glass opacities  - F/u inflammatory markers   - hold on steroids for now    #CAD s/p RCA PCI, unlikely cause of HF as per cardio last admission  - Aspirin, plavix, metoprolol     #COPD sever chronic hypoxemic respiratory failure on 5 L at home- not in excerbation  #Sever Phtn  - likely due to volume overload  - now on 4 L sat 96%- will taper down goal of pulse ox 89-92%  -nebs/inhalers tx.     #CORNELIUS on cpap    CVA/Dyslipidemia-   stable - continue atrovatatin, aspirin    DM 2   - Hold PO medications  - controlled  - c/w insulin    Diet DASH/TLC  GI ppx PPI  dVT proph- lovenox subc. tx.  code status: full code     56 year old female patient with multiple comorbidities including COPD on home O2, CORNELIUS on CPAP, HFrEF (15-20%) likely non ischemic as per cardio note last admission, sever pulmonary htn, CAD s/p PCI to RCA, HTN, DL/CVA with residual left LE weakness, and DMII, active smoker, with left lower ext swelling for 2 days.    #Left lower ext swelling likely from HFrEF acute on chronic decompensation  #Cor pulmonale, sever phtn   - recurrent admission for HF  - Uses life vest at home   -TTE 11/14 EF 15-20%, DDII, WMA, mod MR, mild-mod TR, severe PHTN, small pericardial effusion  - BNP elevated, PE and imaging consistent with CHF excerbation, gained around 8kg since last admission   -strict I and O chart  -daily weight, fluid restriction  -continue toprol XL, entresto, spironolactone  -on 20 toresimide at home, IV lasix 40 BID inpatient       #COVID positive   - CT scan showed ground glass opacities  - F/u inflammatory markers   - hold on steroids for now    #CAD s/p RCA PCI, unlikely cause of HF as per cardio last admission  - Aspirin, plavix, metoprolol     #COPD sever chronic hypoxemic respiratory failure on 5 L at home- not in excerbation  #Sever Phtn  - likely due to volume overload  - now on 4 L sat 96%- will taper down goal of pulse ox 89-92%  -nebs/inhalers tx.     #CORNELIUS on cpap    CVA/Dyslipidemia-   stable - continue atrovatatin, aspirin    DM 2   - Hold PO medications  - controlled  - c/w insulin    Diet DASH/TLC  GI ppx PPI  dVT proph- lovenox subc. tx.  code status: full code     56 year old female patient with multiple comorbidities including COPD on home O2, CORNELIUS on CPAP, HFrEF (15-20%) likely non ischemic as per cardio note last admission, sever pulmonary htn, CAD s/p PCI to RCA, HTN, DL/CVA with residual left LE weakness, and DMII, active smoker, with left lower ext swelling for 2 days.    #Left lower ext swelling likely from HFrEF acute on chronic decompensation  #Cor pulmonale, sever phtn   - recurrent admission for HF  - Uses life vest at home   -TTE 11/14 EF 15-20%, DDII, WMA, mod MR, mild-mod TR, severe PHTN, small pericardial effusion  - BNP elevated, PE and imaging consistent with CHF excerbation, gained around 8kg since last admission   -strict I and O chart  -daily weight, fluid restriction  -continue toprol XL, entresto, spironolactone  -on 20 toresimide at home, IV lasix 40 BID inpatient       #COVID positive   - CT scan showed ground glass opacities  - F/u inflammatory markers   - hold on steroids for now    #CAD s/p RCA PCI, unlikely cause of HF as per cardio last admission  - Aspirin, plavix, metoprolol     #COPD sever chronic hypoxemic respiratory failure on 5 L at home- not in excerbation  #Sever Phtn  - likely due to volume overload  - now on 4 L sat 96%- will taper down goal of pulse ox 89-92%  -nebs/inhalers tx.     #CORNELIUS on cpap    CVA/Dyslipidemia-   stable - continue atrovatatin, aspirin    DM 2   - Hold PO medications  - controlled  - c/w insulin if needed    Diet DASH/TLC  GI ppx PPI  dVT proph- lovenox subc. tx.  code status: full code     56 year old female patient with multiple comorbidities including COPD on home O2, CORNELIUS on CPAP, HFrEF (15-20%) likely non ischemic as per cardio note last admission, sever pulmonary htn, CAD s/p PCI to RCA, HTN, DL/CVA with residual left LE weakness, and DMII, active smoker, with left lower ext swelling for 2 days.    #Left lower ext swelling likely from HFrEF acute on chronic decompensation  #Cor pulmonale, sever phtn   - recurrent admission for HF  - Uses life vest at home   -TTE 11/14 EF 15-20%, DDII, WMA, mod MR, mild-mod TR, severe PHTN, small pericardial effusion  - BNP elevated, PE and imaging consistent with CHF excerbation, gained around 8kg since last admission   -strict I and O chart  -daily weight, fluid restriction  -continue toprol XL, entresto, spironolactone  -on 20 toresimide at home, IV lasix 40 BID inpatient       #COVID positive   - CT scan showed ground glass opacities  - F/u inflammatory markers   - hold on steroids for now    #CAD s/p RCA PCI, unlikely cause of HF as per cardio last admission  - Aspirin, plavix, metoprolol     #COPD sever chronic hypoxemic respiratory failure on 5 L at home- not in excerbation  #Sever Phtn  - likely due to volume overload  - now on 4 L sat 96%- will taper down goal of pulse ox 89-92%  -nebs/inhalers tx.     #CORNELIUS on cpap    CVA/Dyslipidemia-   stable - continue atrovatatin, aspirin, fenofibrate    DM 2   - Hold PO medications  - controlled  - c/w insulin if needed    Diet DASH/TLC  GI ppx PPI  dVT proph- lovenox subc. tx.  code status: full code     56 year old female patient with multiple comorbidities including COPD on home O2, CORNELIUS on CPAP, HFrEF (15-20%) likely non ischemic as per cardio note last admission, sever pulmonary htn, CAD s/p PCI to RCA, HTN, DL/CVA with residual left LE weakness, and DMII, active smoker, with left lower ext swelling for 2 days.    #Left lower ext swelling likely from HFrEF acute on chronic decompensation  #Cor pulmonale, sever phtn   - recurrent admission for HF  - Uses life vest at home   -TTE 11/14 EF 15-20%, DDII, WMA, mod MR, mild-mod TR, severe PHTN, small pericardial effusion  - BNP elevated, PE and imaging consistent with CHF excerbation, gained around 8kg since last admission   - venous duplex and CTA negative for VTE or PE  -strict I and O chart  -daily weight, fluid restriction  -continue toprol XL, entresto, spironolactone  -on 20 toresimide at home, IV lasix 40 BID inpatient       #COVID positive   - CT scan showed ground glass opacities  - F/u inflammatory markers   - hold on steroids for now    #CAD s/p RCA PCI, unlikely cause of HF as per cardio last admission  - Aspirin, plavix, metoprolol     #COPD sever chronic hypoxemic respiratory failure on 5 L at home- not in excerbation  #Sever Phtn  - likely due to volume overload  - now on 4 L sat 96%- will taper down goal of pulse ox 89-92%  -nebs/inhalers tx.     #CORNELIUS on cpap    CVA/Dyslipidemia-   stable - continue atrovatatin, aspirin, fenofibrate    DM 2   - Hold PO medications  - controlled  - c/w insulin if needed    Diet DASH/TLC  GI ppx PPI  dVT proph- lovenox subc. tx.  code status: full code

## 2022-01-11 NOTE — H&P ADULT - NSHPLABSRESULTS_GEN_ALL_CORE
13.9   5.30  )-----------( 314      ( 10 Hammad 2022 18:18 )             44.5       01-10    137  |  102  |  9<L>  ----------------------------<  175<H>  3.8   |  19  |  0.9    Ca    8.7      10 Hammad 2022 18:18    TPro  6.4  /  Alb  3.4<L>  /  TBili  1.3<H>  /  DBili  x   /  AST  20  /  ALT  8   /  AlkPhos  68  01-10              CARDIAC MARKERS ( 10 Hammad 2022 18:18 )  x     / 0.02 ng/mL / x     / x     / x            CAPILLARY BLOOD GLUCOSE      POCT Blood Glucose.: 155 mg/dL (11 Jan 2022 07:39)

## 2022-01-11 NOTE — PATIENT PROFILE ADULT - FALL HARM RISK - HARM RISK INTERVENTIONS
Assistance with ambulation/Assistance OOB with selected safe patient handling equipment/Communicate Risk of Fall with Harm to all staff/Discuss with provider need for PT consult/Monitor gait and stability/Reinforce activity limits and safety measures with patient and family/Tailored Fall Risk Interventions/Visual Cue: Yellow wristband and red socks/Bed in lowest position, wheels locked, appropriate side rails in place/Call bell, personal items and telephone in reach/Instruct patient to call for assistance before getting out of bed or chair/Non-slip footwear when patient is out of bed/[place] to call system/Physically safe environment - no spills, clutter or unnecessary equipment/Purposeful Proactive Rounding/Room/bathroom lighting operational, light cord in reach

## 2022-01-11 NOTE — H&P ADULT - HISTORY OF PRESENT ILLNESS
56 year old female patient with multiple comorbidities including COPD on home O2, CORNELIUS on CPAP, HFrEF (15-20%) likely non ischemic as per cardio note last admission, sever pulmonary htn, CAD s/p PCI to RCA, HTN, DL/CVA with residual left LE weakness, and DMII, active smoker, with left lower ext swelling for 2 days.  Swelling is associated with mild pain, no redness, no overlying rash.   She have chronic SOB, as per patient not worse than baseline, dyspnea on minimal exertion, no CP, palpations. She sleep with head of bed elevated.  she is taking medications as prescribed, complaint with diet.  She mentioned she is having cough for 1 day, no fever, no worsening sputum production.  She walks with cane at baseline, have home health aide.     In the ED vitals stable, BNP 12k from 6 K last admission. weight 81kg now 89?, left lower ext duplex negative, COVID +ve, Reflux of contrast into the IVC may be seen with right heart dysfunction. Left lung groundglass opacity . Mosaic attenuation consistent small airway  disease as well as pulmonary edema. given 40 IV lasix

## 2022-01-11 NOTE — H&P ADULT - NSHPPHYSICALEXAM_GEN_ALL_CORE
GENERAL: NAD, lying in bed comfortably, slightly lethargic, AO3, on 5 L  EYES: EOMI, PERRLA, conjunctiva and sclera clear  NECK: Supple, No JVD  CHEST/LUNG: bilateral harsh crackles, +ve wheezing  HEART: Regular rate and rhythm;   ABDOMEN: Bowel sounds present; Soft, Nontender, Nondistended.  EXTREMITIES:  +1 right lower ext swelling, +2 left lower ext swelling, intact pulses  NERVOUS SYSTEM:  Alert & Oriented X3, speech clear. 3/5 left upper and lower side weakness, intact cranial nerves.   SKIN: No rashes or lesions

## 2022-01-11 NOTE — H&P ADULT - ATTENDING COMMENTS
56 year old female with PMH of HTN, DM 2, CAD, PCI, HFrEF, COPD on home O2, CORNELIUS on CPAP and CVA with residual left LE weakness came to ED for left lower ext swelling for 2 days. She is also reports worsening cough and SOB, no chest pain, no fever or chest pain. n the ED vitals stable, BNP 12k from 6 K last admission. left lower ext duplex negative, COVID-19 positive, Chest CT with Left lung ground-glass opacity .     PHYSICAL EXAM:  GENERAL: NAD, well-developed.  HEAD:  Atraumatic, Normocephalic.  EYES: EOMI, PERRLA, conjunctiva and sclera clear.  NECK: Supple, No JVD.  CHEST/LUNG: Clear to auscultation bilaterally; No wheeze.  HEART: Regular rate and rhythm; S1 S2.   ABDOMEN: Soft, Nontender, Nondistended; Bowel sounds present.  EXTREMITIES:  2+ Peripheral Pulses, No clubbing, cyanosis, or edema.  PSYCH: AAOx3.  NEUROLOGY: non-focal.  SKIN: No rashes or lesions.    A/P:   Left upper and lower extremities edema:   Likely from previous stroke, weakness, less use.   Venous duplex is negative for DVT.     Acute HFrEF:   Pulmonary HTN:   SOB, leg edema, elevated Pro-BNP 03610  CXR and chest CT showed bilateral infiltrates.   Echo Nov 2021 LVEF 15-20%, DDII, WMA, mod MR, mild-mod TR, severe PHTN  Lasix 40mg IV BID.   Continue Metoprolol and Entresto.     Acute COPD exacerbation  COVID-19 infection:   CXR as above.   Check D-dimer.   Start on Solu-Medrol 40mg IV BID.   Pulmonary consult.     History of CVA with residual LE weakness  Continue ASA, Lipitor       HTN: Continue Entresto, Lasix and Metoprolol.    DM II: FS is controlled, A1C 7.7,     Depression: Continue Seroquel.

## 2022-01-11 NOTE — H&P ADULT - REASON FOR ADMISSION
Received call from Dr. Wisdom-patient needs to have needle localization ordered for surgery.  Orders placed.   Left lower ext swelling

## 2022-01-12 LAB
ALBUMIN SERPL ELPH-MCNC: 3.2 G/DL — LOW (ref 3.5–5.2)
ALP SERPL-CCNC: 69 U/L — SIGNIFICANT CHANGE UP (ref 30–115)
ALT FLD-CCNC: 7 U/L — SIGNIFICANT CHANGE UP (ref 0–41)
ANION GAP SERPL CALC-SCNC: 15 MMOL/L — HIGH (ref 7–14)
AST SERPL-CCNC: 16 U/L — SIGNIFICANT CHANGE UP (ref 0–41)
BASOPHILS # BLD AUTO: 0.04 K/UL — SIGNIFICANT CHANGE UP (ref 0–0.2)
BASOPHILS NFR BLD AUTO: 1 % — SIGNIFICANT CHANGE UP (ref 0–1)
BILIRUB SERPL-MCNC: 1.2 MG/DL — SIGNIFICANT CHANGE UP (ref 0.2–1.2)
BUN SERPL-MCNC: 7 MG/DL — LOW (ref 10–20)
CALCIUM SERPL-MCNC: 8.4 MG/DL — LOW (ref 8.5–10.1)
CHLORIDE SERPL-SCNC: 102 MMOL/L — SIGNIFICANT CHANGE UP (ref 98–110)
CO2 SERPL-SCNC: 26 MMOL/L — SIGNIFICANT CHANGE UP (ref 17–32)
CREAT SERPL-MCNC: 1 MG/DL — SIGNIFICANT CHANGE UP (ref 0.7–1.5)
CRP SERPL-MCNC: 8 MG/L — HIGH
D DIMER BLD IA.RAPID-MCNC: 229 NG/ML DDU — SIGNIFICANT CHANGE UP (ref 0–230)
EOSINOPHIL # BLD AUTO: 0.04 K/UL — SIGNIFICANT CHANGE UP (ref 0–0.7)
EOSINOPHIL NFR BLD AUTO: 1 % — SIGNIFICANT CHANGE UP (ref 0–8)
ERYTHROCYTE [SEDIMENTATION RATE] IN BLOOD: 6 MM/HR — SIGNIFICANT CHANGE UP (ref 0–20)
FERRITIN SERPL-MCNC: 283 NG/ML — HIGH (ref 15–150)
GLUCOSE BLDC GLUCOMTR-MCNC: 137 MG/DL — HIGH (ref 70–99)
GLUCOSE BLDC GLUCOMTR-MCNC: 138 MG/DL — HIGH (ref 70–99)
GLUCOSE BLDC GLUCOMTR-MCNC: 151 MG/DL — HIGH (ref 70–99)
GLUCOSE BLDC GLUCOMTR-MCNC: 173 MG/DL — HIGH (ref 70–99)
GLUCOSE SERPL-MCNC: 135 MG/DL — HIGH (ref 70–99)
HCT VFR BLD CALC: 42.8 % — SIGNIFICANT CHANGE UP (ref 37–47)
HGB BLD-MCNC: 13.2 G/DL — SIGNIFICANT CHANGE UP (ref 12–16)
IMM GRANULOCYTES NFR BLD AUTO: 0.7 % — HIGH (ref 0.1–0.3)
LDH SERPL L TO P-CCNC: 306 — HIGH (ref 50–242)
LYMPHOCYTES # BLD AUTO: 1.02 K/UL — LOW (ref 1.2–3.4)
LYMPHOCYTES # BLD AUTO: 24.5 % — SIGNIFICANT CHANGE UP (ref 20.5–51.1)
MAGNESIUM SERPL-MCNC: 1.3 MG/DL — LOW (ref 1.8–2.4)
MCHC RBC-ENTMCNC: 27.7 PG — SIGNIFICANT CHANGE UP (ref 27–31)
MCHC RBC-ENTMCNC: 30.8 G/DL — LOW (ref 32–37)
MCV RBC AUTO: 89.9 FL — SIGNIFICANT CHANGE UP (ref 81–99)
MONOCYTES # BLD AUTO: 0.13 K/UL — SIGNIFICANT CHANGE UP (ref 0.1–0.6)
MONOCYTES NFR BLD AUTO: 3.1 % — SIGNIFICANT CHANGE UP (ref 1.7–9.3)
NEUTROPHILS # BLD AUTO: 2.91 K/UL — SIGNIFICANT CHANGE UP (ref 1.4–6.5)
NEUTROPHILS NFR BLD AUTO: 69.7 % — SIGNIFICANT CHANGE UP (ref 42.2–75.2)
NRBC # BLD: 0 /100 WBCS — SIGNIFICANT CHANGE UP (ref 0–0)
PHOSPHATE SERPL-MCNC: 3.9 MG/DL — SIGNIFICANT CHANGE UP (ref 2.1–4.9)
PLATELET # BLD AUTO: 261 K/UL — SIGNIFICANT CHANGE UP (ref 130–400)
POTASSIUM SERPL-MCNC: 3.4 MMOL/L — LOW (ref 3.5–5)
POTASSIUM SERPL-SCNC: 3.4 MMOL/L — LOW (ref 3.5–5)
PROCALCITONIN SERPL-MCNC: 0.08 NG/ML — SIGNIFICANT CHANGE UP (ref 0.02–0.1)
PROT SERPL-MCNC: 6 G/DL — SIGNIFICANT CHANGE UP (ref 6–8)
RBC # BLD: 4.76 M/UL — SIGNIFICANT CHANGE UP (ref 4.2–5.4)
RBC # FLD: 20.8 % — HIGH (ref 11.5–14.5)
SODIUM SERPL-SCNC: 143 MMOL/L — SIGNIFICANT CHANGE UP (ref 135–146)
WBC # BLD: 4.17 K/UL — LOW (ref 4.8–10.8)
WBC # FLD AUTO: 4.17 K/UL — LOW (ref 4.8–10.8)

## 2022-01-12 PROCEDURE — 99232 SBSQ HOSP IP/OBS MODERATE 35: CPT | Mod: GC

## 2022-01-12 RX ORDER — FUROSEMIDE 40 MG
40 TABLET ORAL ONCE
Refills: 0 | Status: COMPLETED | OUTPATIENT
Start: 2022-01-12 | End: 2022-01-12

## 2022-01-12 RX ADMIN — Medication 50 MILLIGRAM(S): at 05:56

## 2022-01-12 RX ADMIN — PANTOPRAZOLE SODIUM 40 MILLIGRAM(S): 20 TABLET, DELAYED RELEASE ORAL at 06:18

## 2022-01-12 RX ADMIN — SACUBITRIL AND VALSARTAN 1 TABLET(S): 24; 26 TABLET, FILM COATED ORAL at 05:56

## 2022-01-12 RX ADMIN — Medication 40 MILLIGRAM(S): at 17:24

## 2022-01-12 RX ADMIN — SACUBITRIL AND VALSARTAN 1 TABLET(S): 24; 26 TABLET, FILM COATED ORAL at 17:24

## 2022-01-12 RX ADMIN — CLOPIDOGREL BISULFATE 75 MILLIGRAM(S): 75 TABLET, FILM COATED ORAL at 11:32

## 2022-01-12 RX ADMIN — ATORVASTATIN CALCIUM 80 MILLIGRAM(S): 80 TABLET, FILM COATED ORAL at 21:55

## 2022-01-12 RX ADMIN — Medication 145 MILLIGRAM(S): at 11:33

## 2022-01-12 RX ADMIN — Medication 40 MILLIGRAM(S): at 05:55

## 2022-01-12 RX ADMIN — ENOXAPARIN SODIUM 40 MILLIGRAM(S): 100 INJECTION SUBCUTANEOUS at 11:32

## 2022-01-12 RX ADMIN — CHLORHEXIDINE GLUCONATE 1 APPLICATION(S): 213 SOLUTION TOPICAL at 05:55

## 2022-01-12 RX ADMIN — QUETIAPINE FUMARATE 25 MILLIGRAM(S): 200 TABLET, FILM COATED ORAL at 21:55

## 2022-01-12 RX ADMIN — SPIRONOLACTONE 25 MILLIGRAM(S): 25 TABLET, FILM COATED ORAL at 05:56

## 2022-01-12 RX ADMIN — Medication 81 MILLIGRAM(S): at 11:32

## 2022-01-12 NOTE — PROGRESS NOTE ADULT - ATTENDING COMMENTS
CHF acute on chronic systolic and diastolic with pulm HTN  - responded well to diuresis  - change back to PO but double home dose  - will need cardiology f/u as outpt  - spoke to brother which endorses multiple admissions for similar presentation    COVID   - Not clearly a cause of SOB, possibly incidental finding. Isolation for 10 days recommended  - had vaccine x2 doses, was scheduled for booster on 1/20    D/C planning if back at baseline home o2 setting

## 2022-01-12 NOTE — PROGRESS NOTE ADULT - SUBJECTIVE AND OBJECTIVE BOX
WILLIAN MARY 56y Female  MRN#: [id number]   CODE STATUS: FULL     Hospital Day: 1d    Pt is currently admitted with the primary diagnosis of SOB     SUBJECTIVE  Hospital Course    Overnight events: No events overnight     Subjective complaints: She reports improvement in     Present Today:   - Rhea:  No [  ], Yes [   ] : Indication:     - Type of IV Access:       .. CVC/Piccline:  No [  ], Yes [   ] : Indication:       .. Midline: No [  ], Yes [   ] : Indication:                                             ----------------------------------------------------------  OBJECTIVE  PAST MEDICAL & SURGICAL HISTORY  Hypertension    Hyperlipidemia    Anxiety and depression    COPD, severe    CHF (congestive heart failure)    Cerebrovascular accident (CVA)  Multiple    Type 2 diabetes mellitus    CKD (chronic kidney disease), stage II    No significant past surgical history                                              -----------------------------------------------------------  ALLERGIES:  No Known Allergies                                            ------------------------------------------------------------    HOME MEDICATIONS  Home Medications:  pantoprazole 40 mg oral delayed release tablet: 1 tab(s) orally once a day (before a meal) (16 Nov 2021 12:10)                           MEDICATIONS:  STANDING MEDICATIONS  aspirin enteric coated 81 milliGRAM(s) Oral daily  atorvastatin 80 milliGRAM(s) Oral at bedtime  budesonide 160 MICROgram(s)/formoterol 4.5 MICROgram(s) Inhaler 2 Puff(s) Inhalation two times a day  chlorhexidine 4% Liquid 1 Application(s) Topical <User Schedule>  clopidogrel Tablet 75 milliGRAM(s) Oral daily  enoxaparin Injectable 40 milliGRAM(s) SubCutaneous daily  fenofibrate Tablet 145 milliGRAM(s) Oral daily  furosemide   Injectable 40 milliGRAM(s) IV Push once  influenza   Vaccine 0.5 milliLiter(s) IntraMuscular once  metoprolol succinate ER 50 milliGRAM(s) Oral daily  pantoprazole    Tablet 40 milliGRAM(s) Oral before breakfast  QUEtiapine 25 milliGRAM(s) Oral at bedtime  sacubitril 49 mG/valsartan 51 mG 1 Tablet(s) Oral two times a day  spironolactone 25 milliGRAM(s) Oral daily    PRN MEDICATIONS  acetaminophen     Tablet .. 650 milliGRAM(s) Oral every 6 hours PRN  ALBUTerol    90 MICROgram(s) HFA Inhaler 2 Puff(s) Inhalation every 6 hours PRN  aluminum hydroxide/magnesium hydroxide/simethicone Suspension 30 milliLiter(s) Oral every 4 hours PRN  melatonin 3 milliGRAM(s) Oral at bedtime PRN  ondansetron Injectable 4 milliGRAM(s) IV Push every 8 hours PRN                                            ------------------------------------------------------------  VITAL SIGNS: Last 24 Hours  T(C): 36.8 (12 Jan 2022 14:35), Max: 36.8 (12 Jan 2022 14:35)  T(F): 98.3 (12 Jan 2022 14:35), Max: 98.3 (12 Jan 2022 14:35)  HR: 78 (12 Jan 2022 14:35) (78 - 85)  BP: 131/73 (12 Jan 2022 14:35) (115/74 - 131/73)  BP(mean): --  RR: 18 (12 Jan 2022 14:35) (18 - 18)  SpO2: --      01-11-22 @ 07:01  -  01-12-22 @ 07:00  --------------------------------------------------------  IN: 240 mL / OUT: 800 mL / NET: -560 mL    01-12-22 @ 07:01  -  01-12-22 @ 15:49  --------------------------------------------------------  IN: 570 mL / OUT: 1900 mL / NET: -1330 mL                                             --------------------------------------------------------------  LABS:                        13.2   4.17  )-----------( 261      ( 12 Jan 2022 04:30 )             42.8     01-12    143  |  102  |  7<L>  ----------------------------<  135<H>  3.4<L>   |  26  |  1.0    Ca    8.4<L>      12 Jan 2022 04:30  Phos  3.9     01-12  Mg     1.3     01-12    TPro  6.0  /  Alb  3.2<L>  /  TBili  1.2  /  DBili  x   /  AST  16  /  ALT  7   /  AlkPhos  69  01-12                  CARDIAC MARKERS ( 11 Jan 2022 12:44 )  x     / 0.02 ng/mL / x     / x     / x      CARDIAC MARKERS ( 10 Hammad 2022 18:18 )  x     / 0.02 ng/mL / x     / x     / x                                                  -------------------------------------------------------------  RADIOLOGY:                                            --------------------------------------------------------------    PHYSICAL EXAM:                                             --------------------------------------------------------------    ASSESSMENT & PLAN     WILLIAN MARY 56y Female  MRN#: [id number]   CODE STATUS: FULL     Hospital Day: 1d    Pt is currently admitted with the primary diagnosis of SOB     SUBJECTIVE  Hospital Course    Overnight events: No events overnight     Subjective complaints: She reports improvement in SOB. Denies fevers, chills, cough, chest pain, abdominal pain, le edema.    Present Today:   - Wilkerson:  No [  ], Yes [   ] : Indication:     - Type of IV Access:       .. CVC/Piccline:  No [  ], Yes [   ] : Indication:       .. Midline: No [  ], Yes [   ] : Indication:                                             ----------------------------------------------------------  OBJECTIVE  PAST MEDICAL & SURGICAL HISTORY  Hypertension    Hyperlipidemia    Anxiety and depression    COPD, severe    CHF (congestive heart failure)    Cerebrovascular accident (CVA)  Multiple    Type 2 diabetes mellitus    CKD (chronic kidney disease), stage II    No significant past surgical history                                              -----------------------------------------------------------  ALLERGIES:  No Known Allergies                                            ------------------------------------------------------------    HOME MEDICATIONS  Home Medications:  pantoprazole 40 mg oral delayed release tablet: 1 tab(s) orally once a day (before a meal) (16 Nov 2021 12:10)                           MEDICATIONS:  STANDING MEDICATIONS  aspirin enteric coated 81 milliGRAM(s) Oral daily  atorvastatin 80 milliGRAM(s) Oral at bedtime  budesonide 160 MICROgram(s)/formoterol 4.5 MICROgram(s) Inhaler 2 Puff(s) Inhalation two times a day  chlorhexidine 4% Liquid 1 Application(s) Topical <User Schedule>  clopidogrel Tablet 75 milliGRAM(s) Oral daily  enoxaparin Injectable 40 milliGRAM(s) SubCutaneous daily  fenofibrate Tablet 145 milliGRAM(s) Oral daily  furosemide   Injectable 40 milliGRAM(s) IV Push once  influenza   Vaccine 0.5 milliLiter(s) IntraMuscular once  metoprolol succinate ER 50 milliGRAM(s) Oral daily  pantoprazole    Tablet 40 milliGRAM(s) Oral before breakfast  QUEtiapine 25 milliGRAM(s) Oral at bedtime  sacubitril 49 mG/valsartan 51 mG 1 Tablet(s) Oral two times a day  spironolactone 25 milliGRAM(s) Oral daily    PRN MEDICATIONS  acetaminophen     Tablet .. 650 milliGRAM(s) Oral every 6 hours PRN  ALBUTerol    90 MICROgram(s) HFA Inhaler 2 Puff(s) Inhalation every 6 hours PRN  aluminum hydroxide/magnesium hydroxide/simethicone Suspension 30 milliLiter(s) Oral every 4 hours PRN  melatonin 3 milliGRAM(s) Oral at bedtime PRN  ondansetron Injectable 4 milliGRAM(s) IV Push every 8 hours PRN                                            ------------------------------------------------------------  VITAL SIGNS: Last 24 Hours  T(C): 36.8 (12 Jan 2022 14:35), Max: 36.8 (12 Jan 2022 14:35)  T(F): 98.3 (12 Jan 2022 14:35), Max: 98.3 (12 Jan 2022 14:35)  HR: 78 (12 Jan 2022 14:35) (78 - 85)  BP: 131/73 (12 Jan 2022 14:35) (115/74 - 131/73)  BP(mean): --  RR: 18 (12 Jan 2022 14:35) (18 - 18)  SpO2: --      01-11-22 @ 07:01  -  01-12-22 @ 07:00  --------------------------------------------------------  IN: 240 mL / OUT: 800 mL / NET: -560 mL    01-12-22 @ 07:01  -  01-12-22 @ 15:49  --------------------------------------------------------  IN: 570 mL / OUT: 1900 mL / NET: -1330 mL                                             --------------------------------------------------------------  LABS:                        13.2   4.17  )-----------( 261      ( 12 Jan 2022 04:30 )             42.8     01-12    143  |  102  |  7<L>  ----------------------------<  135<H>  3.4<L>   |  26  |  1.0    Ca    8.4<L>      12 Jan 2022 04:30  Phos  3.9     01-12  Mg     1.3     01-12    TPro  6.0  /  Alb  3.2<L>  /  TBili  1.2  /  DBili  x   /  AST  16  /  ALT  7   /  AlkPhos  69  01-12                  CARDIAC MARKERS ( 11 Jan 2022 12:44 )  x     / 0.02 ng/mL / x     / x     / x      CARDIAC MARKERS ( 10 Hammad 2022 18:18 )  x     / 0.02 ng/mL / x     / x     / x                                                  -------------------------------------------------------------  RADIOLOGY:  Imaging reviewed                                           --------------------------------------------------------------    PHYSICAL EXAM:  GENERAL: NAD, lying in bed comfortably, slightly lethargic, AO3, on 5 L  EYES: EOMI, PERRLA, conjunctiva and sclera clear  NECK: Supple, No JVD  CHEST/LUNG: bilateral harsh crackles, +ve wheezing  HEART: Regular rate and rhythm;   ABDOMEN: Bowel sounds present; Soft, Nontender, Nondistended.  EXTREMITIES:  +1 right lower ext swelling, +2 left lower ext swelling, intact pulses  NERVOUS SYSTEM:  Alert & Oriented X3, speech clear. 3/5 left upper and lower side weakness, intact cranial nerves.   SKIN: No rashes or lesions                                           --------------------------------------------------------------    ASSESSMENT & PLAN  56 year old female patient with multiple comorbidities including COPD on home O2, CORNELIUS on CPAP, HFrEF (15-20%) likely non ischemic as per cardio note last admission, sever pulmonary htn, CAD s/p PCI to RCA, HTN, DL/CVA with residual left LE weakness, and DMII, active smoker, with left lower ext swelling for 2 days.    #Left lower ext swelling likely from HFrEF acute on chronic decompensation  #Cor pulmonale, sever phtn   - Recurrent admission for HF  - Uses life vest at home   - TTE 11/14 EF 15-20%, DDII, WMA, mod MR, mild-mod TR, severe PHTN, small pericardial effusion  - BNP elevated, PE and imaging consistent with CHF excerbation, gained around 8kg since last admission   - Venous duplex and CTA negative for VTE or PE  - strict I and O chart  - daily weight, fluid restriction  - continue toprol XL, entresto, spironolactone  - on 20 toresimide at home, IV lasix 40 BID inpatient.   - Plan to c/w IV lasix today. Patient appears close to euvolemic. Start on torsemide 20mg BID tomorrow.     #COVID Positive   - CT scan showed ground glass opacities  - F/u inflammatory markers   - hold on steroids for now. Currently on Home O2 5L     #CAD s/p RCA PCI, unlikely cause of HF as per cardio last admission  - Aspirin, Plavix, Metoprolol     #COPD sever chronic hypoxemic respiratory failure on 5 L at home- not in excerbation  #Sever Phtn  - likely due to volume overload  - Now on 4 L sat 96%- will taper down goal of pulse ox 89-92%  - Nebs/inhalers tx.   - Not in exacerbation Not wheezing. D/C solumedrol   - F/U repeat inflammatory markers     #CORNELIUS on cpap  - c/w cpap at night     #CVA/Dyslipidemia   - stable - continue atorvastatin aspirin, fenofibrate    #DM 2   - Hold PO medications  - Controlled  - c/w insulin if needed     Diet DASH/TLC  GI ppx PPI  DVT proph- Lovenox subc. tx.  Code Status: full code  Dispo: Anticipate for tomorrow,

## 2022-01-13 LAB
ALBUMIN SERPL ELPH-MCNC: 3.1 G/DL — LOW (ref 3.5–5.2)
ALP SERPL-CCNC: 65 U/L — SIGNIFICANT CHANGE UP (ref 30–115)
ALT FLD-CCNC: 5 U/L — SIGNIFICANT CHANGE UP (ref 0–41)
ANION GAP SERPL CALC-SCNC: 18 MMOL/L — HIGH (ref 7–14)
AST SERPL-CCNC: 13 U/L — SIGNIFICANT CHANGE UP (ref 0–41)
BASOPHILS # BLD AUTO: 0.03 K/UL — SIGNIFICANT CHANGE UP (ref 0–0.2)
BASOPHILS NFR BLD AUTO: 0.6 % — SIGNIFICANT CHANGE UP (ref 0–1)
BILIRUB SERPL-MCNC: 1.2 MG/DL — SIGNIFICANT CHANGE UP (ref 0.2–1.2)
BUN SERPL-MCNC: 8 MG/DL — LOW (ref 10–20)
CALCIUM SERPL-MCNC: 7.9 MG/DL — LOW (ref 8.5–10.1)
CHLORIDE SERPL-SCNC: 99 MMOL/L — SIGNIFICANT CHANGE UP (ref 98–110)
CO2 SERPL-SCNC: 24 MMOL/L — SIGNIFICANT CHANGE UP (ref 17–32)
CREAT SERPL-MCNC: 1 MG/DL — SIGNIFICANT CHANGE UP (ref 0.7–1.5)
CRP SERPL-MCNC: 6 MG/L — HIGH
EOSINOPHIL # BLD AUTO: 0.06 K/UL — SIGNIFICANT CHANGE UP (ref 0–0.7)
EOSINOPHIL NFR BLD AUTO: 1.3 % — SIGNIFICANT CHANGE UP (ref 0–8)
FERRITIN SERPL-MCNC: 248 NG/ML — HIGH (ref 15–150)
GLUCOSE BLDC GLUCOMTR-MCNC: 118 MG/DL — HIGH (ref 70–99)
GLUCOSE BLDC GLUCOMTR-MCNC: 145 MG/DL — HIGH (ref 70–99)
GLUCOSE BLDC GLUCOMTR-MCNC: 153 MG/DL — HIGH (ref 70–99)
GLUCOSE SERPL-MCNC: 108 MG/DL — HIGH (ref 70–99)
HCT VFR BLD CALC: 40 % — SIGNIFICANT CHANGE UP (ref 37–47)
HGB BLD-MCNC: 12.4 G/DL — SIGNIFICANT CHANGE UP (ref 12–16)
IMM GRANULOCYTES NFR BLD AUTO: 0.6 % — HIGH (ref 0.1–0.3)
LYMPHOCYTES # BLD AUTO: 1.34 K/UL — SIGNIFICANT CHANGE UP (ref 1.2–3.4)
LYMPHOCYTES # BLD AUTO: 28.4 % — SIGNIFICANT CHANGE UP (ref 20.5–51.1)
MAGNESIUM SERPL-MCNC: 1.1 MG/DL — LOW (ref 1.8–2.4)
MAGNESIUM SERPL-MCNC: 1.6 MG/DL — LOW (ref 1.8–2.4)
MCHC RBC-ENTMCNC: 27.7 PG — SIGNIFICANT CHANGE UP (ref 27–31)
MCHC RBC-ENTMCNC: 31 G/DL — LOW (ref 32–37)
MCV RBC AUTO: 89.3 FL — SIGNIFICANT CHANGE UP (ref 81–99)
MONOCYTES # BLD AUTO: 0.23 K/UL — SIGNIFICANT CHANGE UP (ref 0.1–0.6)
MONOCYTES NFR BLD AUTO: 4.9 % — SIGNIFICANT CHANGE UP (ref 1.7–9.3)
NEUTROPHILS # BLD AUTO: 3.03 K/UL — SIGNIFICANT CHANGE UP (ref 1.4–6.5)
NEUTROPHILS NFR BLD AUTO: 64.2 % — SIGNIFICANT CHANGE UP (ref 42.2–75.2)
NRBC # BLD: 0 /100 WBCS — SIGNIFICANT CHANGE UP (ref 0–0)
PLATELET # BLD AUTO: 284 K/UL — SIGNIFICANT CHANGE UP (ref 130–400)
POTASSIUM SERPL-MCNC: 3.5 MMOL/L — SIGNIFICANT CHANGE UP (ref 3.5–5)
POTASSIUM SERPL-SCNC: 3.5 MMOL/L — SIGNIFICANT CHANGE UP (ref 3.5–5)
PROCALCITONIN SERPL-MCNC: 0.06 NG/ML — SIGNIFICANT CHANGE UP (ref 0.02–0.1)
PROT SERPL-MCNC: 5.8 G/DL — LOW (ref 6–8)
RBC # BLD: 4.48 M/UL — SIGNIFICANT CHANGE UP (ref 4.2–5.4)
RBC # FLD: 20.5 % — HIGH (ref 11.5–14.5)
SODIUM SERPL-SCNC: 141 MMOL/L — SIGNIFICANT CHANGE UP (ref 135–146)
WBC # BLD: 4.72 K/UL — LOW (ref 4.8–10.8)
WBC # FLD AUTO: 4.72 K/UL — LOW (ref 4.8–10.8)

## 2022-01-13 PROCEDURE — 99232 SBSQ HOSP IP/OBS MODERATE 35: CPT | Mod: GC

## 2022-01-13 RX ORDER — MAGNESIUM OXIDE 400 MG ORAL TABLET 241.3 MG
400 TABLET ORAL
Refills: 0 | Status: DISCONTINUED | OUTPATIENT
Start: 2022-01-13 | End: 2022-01-15

## 2022-01-13 RX ORDER — GUAIFENESIN/DEXTROMETHORPHAN 600MG-30MG
15 TABLET, EXTENDED RELEASE 12 HR ORAL EVERY 6 HOURS
Refills: 0 | Status: DISCONTINUED | OUTPATIENT
Start: 2022-01-13 | End: 2022-01-15

## 2022-01-13 RX ORDER — POTASSIUM CHLORIDE 20 MEQ
40 PACKET (EA) ORAL ONCE
Refills: 0 | Status: COMPLETED | OUTPATIENT
Start: 2022-01-13 | End: 2022-01-13

## 2022-01-13 RX ORDER — MAGNESIUM SULFATE 500 MG/ML
2 VIAL (ML) INJECTION ONCE
Refills: 0 | Status: COMPLETED | OUTPATIENT
Start: 2022-01-13 | End: 2022-01-13

## 2022-01-13 RX ADMIN — BUDESONIDE AND FORMOTEROL FUMARATE DIHYDRATE 2 PUFF(S): 160; 4.5 AEROSOL RESPIRATORY (INHALATION) at 07:48

## 2022-01-13 RX ADMIN — SACUBITRIL AND VALSARTAN 1 TABLET(S): 24; 26 TABLET, FILM COATED ORAL at 17:02

## 2022-01-13 RX ADMIN — SACUBITRIL AND VALSARTAN 1 TABLET(S): 24; 26 TABLET, FILM COATED ORAL at 05:57

## 2022-01-13 RX ADMIN — Medication 81 MILLIGRAM(S): at 11:22

## 2022-01-13 RX ADMIN — CLOPIDOGREL BISULFATE 75 MILLIGRAM(S): 75 TABLET, FILM COATED ORAL at 11:22

## 2022-01-13 RX ADMIN — Medication 50 MILLIGRAM(S): at 05:57

## 2022-01-13 RX ADMIN — ENOXAPARIN SODIUM 40 MILLIGRAM(S): 100 INJECTION SUBCUTANEOUS at 11:22

## 2022-01-13 RX ADMIN — QUETIAPINE FUMARATE 25 MILLIGRAM(S): 200 TABLET, FILM COATED ORAL at 22:10

## 2022-01-13 RX ADMIN — MAGNESIUM OXIDE 400 MG ORAL TABLET 400 MILLIGRAM(S): 241.3 TABLET ORAL at 17:02

## 2022-01-13 RX ADMIN — SPIRONOLACTONE 25 MILLIGRAM(S): 25 TABLET, FILM COATED ORAL at 05:57

## 2022-01-13 RX ADMIN — MAGNESIUM OXIDE 400 MG ORAL TABLET 400 MILLIGRAM(S): 241.3 TABLET ORAL at 13:05

## 2022-01-13 RX ADMIN — BUDESONIDE AND FORMOTEROL FUMARATE DIHYDRATE 2 PUFF(S): 160; 4.5 AEROSOL RESPIRATORY (INHALATION) at 22:10

## 2022-01-13 RX ADMIN — PANTOPRAZOLE SODIUM 40 MILLIGRAM(S): 20 TABLET, DELAYED RELEASE ORAL at 05:58

## 2022-01-13 RX ADMIN — ATORVASTATIN CALCIUM 80 MILLIGRAM(S): 80 TABLET, FILM COATED ORAL at 22:10

## 2022-01-13 RX ADMIN — Medication 20 MILLIGRAM(S): at 17:02

## 2022-01-13 RX ADMIN — Medication 40 MILLIEQUIVALENT(S): at 09:44

## 2022-01-13 RX ADMIN — Medication 25 GRAM(S): at 09:55

## 2022-01-13 RX ADMIN — Medication 20 MILLIGRAM(S): at 05:57

## 2022-01-13 RX ADMIN — Medication 145 MILLIGRAM(S): at 11:22

## 2022-01-13 NOTE — PROGRESS NOTE ADULT - ATTENDING COMMENTS
Severe hypomagnesemia  - 1.1 currently, repeat again tonight  - not tolerating IV replcement, will try PO  - if mg >1.6, plan for D/C with further PO supplementation at home

## 2022-01-13 NOTE — PROGRESS NOTE ADULT - SUBJECTIVE AND OBJECTIVE BOX
WILLIAN MARY 56y Female  MRN#: [id number]   CODE STATUS: FULL     Hospital Day: 2d    Pt is currently admitted with the primary diagnosis of SOB    SUBJECTIVE  Hospital Course    Overnight events: No events overnight. No events on tele     Subjective complaints: She reports improvement in SOB. Denies chest pain, fevers, N/V/D. Improved LE edema. She states that she did not follow up with Dr. Ge or Dr. Buitrago because she wants to see a cardiologist at her center.     Present Today:   - Wilkerson:  No [  ], Yes [   ] : Indication:     - Type of IV Access:       .. CVC/Piccline:  No [  ], Yes [   ] : Indication:       .. Midline: No [  ], Yes [   ] : Indication:                                             ----------------------------------------------------------  OBJECTIVE  PAST MEDICAL & SURGICAL HISTORY  Hypertension    Hyperlipidemia    Anxiety and depression    COPD, severe    CHF (congestive heart failure)    Cerebrovascular accident (CVA)  Multiple    Type 2 diabetes mellitus    CKD (chronic kidney disease), stage II    No significant past surgical history                                              -----------------------------------------------------------  ALLERGIES:  No Known Allergies                                            ------------------------------------------------------------    HOME MEDICATIONS  Home Medications:  pantoprazole 40 mg oral delayed release tablet: 1 tab(s) orally once a day (before a meal) (16 Nov 2021 12:10)                           MEDICATIONS:  STANDING MEDICATIONS  aspirin enteric coated 81 milliGRAM(s) Oral daily  atorvastatin 80 milliGRAM(s) Oral at bedtime  budesonide 160 MICROgram(s)/formoterol 4.5 MICROgram(s) Inhaler 2 Puff(s) Inhalation two times a day  chlorhexidine 4% Liquid 1 Application(s) Topical <User Schedule>  clopidogrel Tablet 75 milliGRAM(s) Oral daily  enoxaparin Injectable 40 milliGRAM(s) SubCutaneous daily  fenofibrate Tablet 145 milliGRAM(s) Oral daily  influenza   Vaccine 0.5 milliLiter(s) IntraMuscular once  magnesium oxide 400 milliGRAM(s) Oral three times a day with meals  metoprolol succinate ER 50 milliGRAM(s) Oral daily  pantoprazole    Tablet 40 milliGRAM(s) Oral before breakfast  QUEtiapine 25 milliGRAM(s) Oral at bedtime  sacubitril 49 mG/valsartan 51 mG 1 Tablet(s) Oral two times a day  spironolactone 25 milliGRAM(s) Oral daily  torsemide 20 milliGRAM(s) Oral two times a day    PRN MEDICATIONS  acetaminophen     Tablet .. 650 milliGRAM(s) Oral every 6 hours PRN  ALBUTerol    90 MICROgram(s) HFA Inhaler 2 Puff(s) Inhalation every 6 hours PRN  aluminum hydroxide/magnesium hydroxide/simethicone Suspension 30 milliLiter(s) Oral every 4 hours PRN  melatonin 3 milliGRAM(s) Oral at bedtime PRN  ondansetron Injectable 4 milliGRAM(s) IV Push every 8 hours PRN                                            ------------------------------------------------------------  VITAL SIGNS: Last 24 Hours  T(C): 36.4 (13 Jan 2022 12:54), Max: 36.9 (13 Jan 2022 05:02)  T(F): 97.6 (13 Jan 2022 12:54), Max: 98.5 (13 Jan 2022 05:02)  HR: 88 (13 Jan 2022 12:54) (84 - 106)  BP: 124/70 (13 Jan 2022 12:54) (106/71 - 124/70)  BP(mean): --  RR: 17 (13 Jan 2022 12:54) (17 - 18)  SpO2: 100% (12 Jan 2022 22:46) (100% - 100%)      01-12-22 @ 07:01  -  01-13-22 @ 07:00  --------------------------------------------------------  IN: 1346 mL / OUT: 3340 mL / NET: -1994 mL    01-13-22 @ 07:01  -  01-13-22 @ 14:54  --------------------------------------------------------  IN: 500 mL / OUT: 1101 mL / NET: -601 mL                                             --------------------------------------------------------------  LABS:                        12.4   4.72  )-----------( 284      ( 13 Jan 2022 04:30 )             40.0     01-13    141  |  99  |  8<L>  ----------------------------<  108<H>  3.5   |  24  |  1.0    Ca    7.9<L>      13 Jan 2022 04:30  Phos  3.9     01-12  Mg     1.1     01-13    TPro  5.8<L>  /  Alb  3.1<L>  /  TBili  1.2  /  DBili  x   /  AST  13  /  ALT  5   /  AlkPhos  65  01-13          Sedimentation Rate, Erythrocyte: 6 mm/Hr (01-12-22 @ 16:00                                              -------------------------------------------------------------  RADIOLOGY:                                            --------------------------------------------------------------    PHYSICAL EXAM:  GENERAL: NAD, lying in bed comfortably, slightly lethargic, AO3, on 5 L  EYES: EOMI, PERRLA, conjunctiva and sclera clear  NECK: Supple, No JVD  CHEST/LUNG: bilateral harsh crackles, +ve wheezing  HEART: Regular rate and rhythm;   ABDOMEN: Bowel sounds present; Soft, Nontender, Nondistended.  EXTREMITIES:  +1 right lower ext swelling, +2 left lower ext swelling, intact pulses  NERVOUS SYSTEM:  Alert & Oriented X3, speech clear. 3/5 left upper and lower side weakness, intact cranial nerves.   SKIN: No rashes or lesions                                           --------------------------------------------------------------    ASSESSMENT & PLAN  56 year old female patient with multiple comorbidities including COPD on home O2, CORNELIUS on CPAP, HFrEF (15-20%) likely non ischemic as per cardio note last admission, sever pulmonary htn, CAD s/p PCI to RCA, HTN, DL/CVA with residual left LE weakness, and DMII, active smoker, with left lower ext swelling for 2 days.    #Left lower ext swelling likely from HFrEF acute on chronic decompensation  #Cor pulmonale, sever phtn   - Recurrent admission for HF  - Uses life vest at home   - TTE 11/14 EF 15-20%, DDII, WMA, mod MR, mild-mod TR, severe PHTN, small pericardial effusion  - BNP elevated, PE and imaging consistent with CHF excerbation, gained around 8kg since last admission   - Venous duplex and CTA negative for VTE or PE  - strict I and O chart  - daily weight, fluid restriction  - continue toprol XL, entresto, spironolactone  - on 20 toresimide at home, IV lasix 40 BID inpatient.   - Lasix IV dcd. Started on torsemide 20mg BID. Will be discharged on BID dose     #Hypomagnesium  - 1.1 today  - Patient refusing IV magnesium. Tolerated 2g IV.   - Started on MgOX 400mg TID.       #COVID Positive   - CT scan showed ground glass opacities  - F/u inflammatory markers   - hold on steroids for now. Currently on Home O2 5L     #CAD s/p RCA PCI, unlikely cause of HF as per cardio last admission  - Aspirin, Plavix, Metoprolol     #COPD sever chronic hypoxemic respiratory failure on 5 L at home- not in exacerbation  #Sever Phtn  - likely due to volume overload  - Now on 4 L sat 96%- will taper down goal of pulse ox 89-92%  - Nebs/inhalers tx.   - Not in exacerbation Not wheezing. D/C solumedrol   - F/U repeat inflammatory markers     #CORNELIUS on cpap  - c/w cpap at night     #CVA/Dyslipidemia   - stable - continue atorvastatin aspirin, fenofibrate    #DM 2   - Hold PO medications  - Controlled  - c/w insulin if needed     Diet DASH/TLC  GI ppx PPI  DVT proph- Lovenox subc. tx.  Code Status: full code  Dispo: Anticipate for tomorrow, needs Magnesium repletion/ f/u

## 2022-01-14 ENCOUNTER — TRANSCRIPTION ENCOUNTER (OUTPATIENT)
Age: 57
End: 2022-01-14

## 2022-01-14 LAB
ALBUMIN SERPL ELPH-MCNC: 3.4 G/DL — LOW (ref 3.5–5.2)
ALP SERPL-CCNC: 66 U/L — SIGNIFICANT CHANGE UP (ref 30–115)
ALT FLD-CCNC: 5 U/L — SIGNIFICANT CHANGE UP (ref 0–41)
ANION GAP SERPL CALC-SCNC: 17 MMOL/L — HIGH (ref 7–14)
AST SERPL-CCNC: 12 U/L — SIGNIFICANT CHANGE UP (ref 0–41)
BASOPHILS # BLD AUTO: 0.04 K/UL — SIGNIFICANT CHANGE UP (ref 0–0.2)
BASOPHILS NFR BLD AUTO: 0.8 % — SIGNIFICANT CHANGE UP (ref 0–1)
BILIRUB SERPL-MCNC: 1.1 MG/DL — SIGNIFICANT CHANGE UP (ref 0.2–1.2)
BUN SERPL-MCNC: 10 MG/DL — SIGNIFICANT CHANGE UP (ref 10–20)
CALCIUM SERPL-MCNC: 8.6 MG/DL — SIGNIFICANT CHANGE UP (ref 8.5–10.1)
CHLORIDE SERPL-SCNC: 97 MMOL/L — LOW (ref 98–110)
CO2 SERPL-SCNC: 27 MMOL/L — SIGNIFICANT CHANGE UP (ref 17–32)
CREAT SERPL-MCNC: 1 MG/DL — SIGNIFICANT CHANGE UP (ref 0.7–1.5)
EOSINOPHIL # BLD AUTO: 0.07 K/UL — SIGNIFICANT CHANGE UP (ref 0–0.7)
EOSINOPHIL NFR BLD AUTO: 1.4 % — SIGNIFICANT CHANGE UP (ref 0–8)
GLUCOSE SERPL-MCNC: 123 MG/DL — HIGH (ref 70–99)
HCT VFR BLD CALC: 44.3 % — SIGNIFICANT CHANGE UP (ref 37–47)
HGB BLD-MCNC: 13.9 G/DL — SIGNIFICANT CHANGE UP (ref 12–16)
IMM GRANULOCYTES NFR BLD AUTO: 0.6 % — HIGH (ref 0.1–0.3)
LYMPHOCYTES # BLD AUTO: 1.38 K/UL — SIGNIFICANT CHANGE UP (ref 1.2–3.4)
LYMPHOCYTES # BLD AUTO: 26.9 % — SIGNIFICANT CHANGE UP (ref 20.5–51.1)
MAGNESIUM SERPL-MCNC: 1.5 MG/DL — LOW (ref 1.8–2.4)
MCHC RBC-ENTMCNC: 28 PG — SIGNIFICANT CHANGE UP (ref 27–31)
MCHC RBC-ENTMCNC: 31.4 G/DL — LOW (ref 32–37)
MCV RBC AUTO: 89.1 FL — SIGNIFICANT CHANGE UP (ref 81–99)
MONOCYTES # BLD AUTO: 0.35 K/UL — SIGNIFICANT CHANGE UP (ref 0.1–0.6)
MONOCYTES NFR BLD AUTO: 6.8 % — SIGNIFICANT CHANGE UP (ref 1.7–9.3)
NEUTROPHILS # BLD AUTO: 3.26 K/UL — SIGNIFICANT CHANGE UP (ref 1.4–6.5)
NEUTROPHILS NFR BLD AUTO: 63.5 % — SIGNIFICANT CHANGE UP (ref 42.2–75.2)
NRBC # BLD: 0 /100 WBCS — SIGNIFICANT CHANGE UP (ref 0–0)
PLATELET # BLD AUTO: 291 K/UL — SIGNIFICANT CHANGE UP (ref 130–400)
POTASSIUM SERPL-MCNC: 4.1 MMOL/L — SIGNIFICANT CHANGE UP (ref 3.5–5)
POTASSIUM SERPL-SCNC: 4.1 MMOL/L — SIGNIFICANT CHANGE UP (ref 3.5–5)
PROT SERPL-MCNC: 6.4 G/DL — SIGNIFICANT CHANGE UP (ref 6–8)
RBC # BLD: 4.97 M/UL — SIGNIFICANT CHANGE UP (ref 4.2–5.4)
RBC # FLD: 20.9 % — HIGH (ref 11.5–14.5)
SODIUM SERPL-SCNC: 141 MMOL/L — SIGNIFICANT CHANGE UP (ref 135–146)
WBC # BLD: 5.13 K/UL — SIGNIFICANT CHANGE UP (ref 4.8–10.8)
WBC # FLD AUTO: 5.13 K/UL — SIGNIFICANT CHANGE UP (ref 4.8–10.8)

## 2022-01-14 PROCEDURE — 71045 X-RAY EXAM CHEST 1 VIEW: CPT | Mod: 26

## 2022-01-14 PROCEDURE — 99239 HOSP IP/OBS DSCHRG MGMT >30: CPT

## 2022-01-14 RX ORDER — METHYLPREDNISOLONE 4 MG
500 TABLET ORAL ONCE
Refills: 0 | Status: COMPLETED | OUTPATIENT
Start: 2022-01-14 | End: 2022-01-14

## 2022-01-14 RX ORDER — MAGNESIUM SULFATE 500 MG/ML
2 VIAL (ML) INJECTION ONCE
Refills: 0 | Status: COMPLETED | OUTPATIENT
Start: 2022-01-14 | End: 2022-01-14

## 2022-01-14 RX ORDER — MAGNESIUM OXIDE 400 MG ORAL TABLET 241.3 MG
1 TABLET ORAL
Qty: 0 | Refills: 0 | DISCHARGE
Start: 2022-01-14

## 2022-01-14 RX ADMIN — MAGNESIUM OXIDE 400 MG ORAL TABLET 400 MILLIGRAM(S): 241.3 TABLET ORAL at 19:01

## 2022-01-14 RX ADMIN — Medication 145 MILLIGRAM(S): at 12:04

## 2022-01-14 RX ADMIN — PANTOPRAZOLE SODIUM 40 MILLIGRAM(S): 20 TABLET, DELAYED RELEASE ORAL at 05:40

## 2022-01-14 RX ADMIN — BUDESONIDE AND FORMOTEROL FUMARATE DIHYDRATE 2 PUFF(S): 160; 4.5 AEROSOL RESPIRATORY (INHALATION) at 20:00

## 2022-01-14 RX ADMIN — SACUBITRIL AND VALSARTAN 1 TABLET(S): 24; 26 TABLET, FILM COATED ORAL at 19:01

## 2022-01-14 RX ADMIN — BUDESONIDE AND FORMOTEROL FUMARATE DIHYDRATE 2 PUFF(S): 160; 4.5 AEROSOL RESPIRATORY (INHALATION) at 08:11

## 2022-01-14 RX ADMIN — Medication 500 MILLIGRAM(S): at 06:34

## 2022-01-14 RX ADMIN — QUETIAPINE FUMARATE 25 MILLIGRAM(S): 200 TABLET, FILM COATED ORAL at 21:58

## 2022-01-14 RX ADMIN — Medication 81 MILLIGRAM(S): at 12:03

## 2022-01-14 RX ADMIN — MAGNESIUM OXIDE 400 MG ORAL TABLET 400 MILLIGRAM(S): 241.3 TABLET ORAL at 08:11

## 2022-01-14 RX ADMIN — Medication 20 MILLIGRAM(S): at 05:41

## 2022-01-14 RX ADMIN — SPIRONOLACTONE 25 MILLIGRAM(S): 25 TABLET, FILM COATED ORAL at 05:40

## 2022-01-14 RX ADMIN — CLOPIDOGREL BISULFATE 75 MILLIGRAM(S): 75 TABLET, FILM COATED ORAL at 12:05

## 2022-01-14 RX ADMIN — SACUBITRIL AND VALSARTAN 1 TABLET(S): 24; 26 TABLET, FILM COATED ORAL at 05:41

## 2022-01-14 RX ADMIN — ATORVASTATIN CALCIUM 80 MILLIGRAM(S): 80 TABLET, FILM COATED ORAL at 21:58

## 2022-01-14 RX ADMIN — Medication 50 MILLIGRAM(S): at 05:40

## 2022-01-14 RX ADMIN — ENOXAPARIN SODIUM 40 MILLIGRAM(S): 100 INJECTION SUBCUTANEOUS at 12:03

## 2022-01-14 NOTE — DISCHARGE NOTE PROVIDER - HOSPITAL COURSE
56 year old female patient with multiple comorbidities including COPD on home O2, CORNELIUS on CPAP, HFrEF (15-20%) likely non ischemic as per cardio note last admission, sever pulmonary htn, CAD s/p PCI to RCA, HTN, DL/CVA with residual left LE weakness, and DMII, active smoker, with left lower ext swelling for 2 days.    #Left lower ext swelling likely from HFrEF acute on chronic decompensation  #Cor pulmonale, sever phtn   - Recurrent admission for HF  - Uses life vest at home   - TTE 11/14 EF 15-20%, DDII, WMA, mod MR, mild-mod TR, severe PHTN, small pericardial effusion  - BNP elevated, PE and imaging consistent with CHF excerbation, gained around 8kg since last admission   - Venous duplex and CTA negative for VTE or PE  - continue toprol XL, entresto, spironolactone  - on 20 toresimide at home, IV lasix 40 BID inpatient.   - Lasix IV dcd. Started on torsemide 20mg BID. Will be discharged on BID dose     #Hypomagnesium  - Started on MgOX 400mg TID.   - Mg 1.5 today  - Will be dc on magnesium orally    #COVID Positive   - CT scan showed ground glass opacities  - F/u inflammatory markers   - hold on steroids for now. Currently on Home O2 5L     #CAD s/p RCA PCI, unlikely cause of HF as per cardio last admission  - Aspirin, Plavix, Metoprolol     #COPD sever chronic hypoxemic respiratory failure on 5 L at home- not in exacerbation  #Sever Phtn  - likely due to volume overload  - Now on 4 L sat 96%- will taper down goal of pulse ox 89-92%  - Nebs/inhalers tx.   - Not in exacerbation Not wheezing. D/C solumedrol   - F/U repeat inflammatory markers     #CORNELIUS on cpap  - c/w cpap at night     #CVA/Dyslipidemia   - stable - continue atorvastatin aspirin, fenofibrate    #DM 2   - Hold PO medications  - Controlled  - c/w insulin if needed     Diet DASH/TLC  GI ppx PPI  DVT proph- Lovenox subc. tx.  Code Status: full code  Dispo: Anticipate for tomorrow, needs Magnesium repletion/ f/u

## 2022-01-14 NOTE — DISCHARGE NOTE PROVIDER - NSDCCPCAREPLAN_GEN_ALL_CORE_FT
PRINCIPAL DISCHARGE DIAGNOSIS  Diagnosis: Acute pulmonary edema  Assessment and Plan of Treatment: You had a heart failure exacerbation by COVID-19. Your oxygen requirements are stimilar to that of your home requirements. You need to take your medications as indicated.  Please follow up with your heart doctor and [facility] physician in 2 weeks.      SECONDARY DISCHARGE DIAGNOSES  Diagnosis: COVID  Assessment and Plan of Treatment: You were diagnosed with COVID-19. Your oxygen requirements are the same. You will be needing the same oxtgen requirements as your home. Please follow up with your primary care physician in 2 weeks. If you develop fever, chills, excessive shortness of breath, please come back to the hospital.

## 2022-01-14 NOTE — DISCHARGE NOTE PROVIDER - CARE PROVIDER_API CALL
[facility]  INTERNAL MEDICINE  [address] [place] [address]  Phone: [phone]  Fax: [phone]  Follow Up Time: 2 weeks

## 2022-01-14 NOTE — DISCHARGE NOTE NURSING/CASE MANAGEMENT/SOCIAL WORK - PATIENT PORTAL LINK FT
You can access the FollowMyHealth Patient Portal offered by [facility] by registering at the following website: [email or web address]. By joining [facility] FollowMyHealth portal, you will also be able to view your health information using other applications (apps) compatible with our system.

## 2022-01-14 NOTE — DISCHARGE NOTE NURSING/CASE MANAGEMENT/SOCIAL WORK - NSDCPEFALRISK_GEN_ALL_CORE
For information on Fall & Injury Prevention, visit: [email or web address] OR  [email or web address] OR  https://www.cdc.gov/steadi/patient.html

## 2022-01-14 NOTE — DISCHARGE NOTE PROVIDER - NSDCMRMEDTOKEN_GEN_ALL_CORE_FT
Albuterol (Eqv-ProAir HFA) 90 mcg/inh inhalation aerosol: 2 puff(s) inhaled every 6 hours, As Needed  aspirin 81 mg oral delayed release tablet: 1 tab(s) orally once a day   atorvastatin 80 mg oral tablet: 1 tab(s) orally once a day (at bedtime)  budesonide-formoterol 160 mcg-4.5 mcg/inh inhalation aerosol: 2 puff(s) inhaled 2 times a day   dapagliflozin 10 mg oral tablet: 1 tab(s) orally once a day   Entresto 49 mg-51 mg oral tablet: 1 tab(s) orally 2 times a day   ergocalciferol 1.25 mg (50,000 intl units) oral capsule: 1 cap(s) orally every 7 days   fenofibrate 145 mg oral tablet: 1 tab(s) orally once a day  Lovaza 1000 mg oral capsule: 2 cap(s) orally 2 times a day  magnesium oxide 400 mg oral tablet: 1 tab(s) orally 3 times a day (with meals)  metFORMIN 1000 mg oral tablet: 1 tab(s) orally 2 times a day Do not take until 6/23  Metoprolol Succinate ER 50 mg oral tablet, extended release: 1 tab(s) orally once a day   pantoprazole 40 mg oral delayed release tablet: 1 tab(s) orally once a day (before a meal)  Plavix 75 mg oral tablet: 1 tab(s) orally once a day  QUEtiapine 25 mg oral tablet: 1 tab(s) orally once a day (at bedtime)  spironolactone 25 mg oral tablet: 1 tab(s) orally once a day   torsemide 20 mg oral tablet: 1 tab(s) orally once a day

## 2022-01-14 NOTE — PROGRESS NOTE ADULT - SUBJECTIVE AND OBJECTIVE BOX
WILLIAN MARY  [facility]      Patient was evaluated and examined  by bedside, no dyspnea at rest, no cough      REVIEW OF SYSTEMS:  please see pertinent positives mentioned above, all other 12 ROS negative      T(C): , Max: 36.4 (01-13-22 @ 12:54)  HR: 104 (01-14-22 @ 04:54)  BP: 108/78 (01-14-22 @ 04:54)  RR: 18 (01-14-22 @ 04:54)  SpO2: 98% (01-13-22 @ 20:22)  CAPILLARY BLOOD GLUCOSE      POCT Blood Glucose.: 145 mg/dL (13 Jan 2022 16:40)      PHYSICAL EXAM:  General: NAD, AAOX3, patient is laying comfortably in bed, obese  HEENT: AT, NC, Supple, NO JVD, NO CB  Lungs: good breath sounds  B/L, no wheezing, no rhonchi  CVS: normal S1, S2, RRR, NO M/G/R  Abdomen: soft, bowel sounds present, non-tender, non-distended  Extremities: no edema, no clubbing, no cyanosis, positive peripheral pulses b/l  Neuro: no acute focal neurological deficits  Skin: no rash, no ecchymosis      LAB  CBC  Date: 01-14-22 @ 04:30  Mean cell Vfapcbapcv36.0  Mean cell Hemoglobin Conc31.4  Mean cell Volum 89.1  Platelet count-Automate 291  RBC Count 4.97  Red Cell Distrib Width20.9  WBC Count5.13  % Albumin, Urine--  Hematocrit 44.3  Hemoglobin 13.9  CBC  Date: 01-13-22 @ 04:30  Mean cell Crhgenthdm59.7  Mean cell Hemoglobin Conc31.0  Mean cell Volum 89.3  Platelet count-Automate 284  RBC Count 4.48  Red Cell Distrib Width20.5  WBC Count4.72  % Albumin, Urine--  Hematocrit 40.0  Hemoglobin 12.4  CBC  Date: 01-12-22 @ 04:30  Mean cell Pvdxaqqhlk45.7  Mean cell Hemoglobin Conc30.8  Mean cell Volum 89.9  Platelet count-Automate 261  RBC Count 4.76  Red Cell Distrib Width20.8  WBC Count4.17  % Albumin, Urine--  Hematocrit 42.8  Hemoglobin 13.2  CBC  Date: 01-10-22 @ 18:18  Mean cell Hzqpfewipr86.7  Mean cell Hemoglobin Conc31.2  Mean cell Volum 88.8  Platelet count-Automate 314  RBC Count 5.01  Red Cell Distrib Width20.6  WBC Count5.30  % Albumin, Urine--  Hematocrit 44.5  Hemoglobin 13.9    [facility]  01-14-22 @ 04:30  Blood Gas Arterial-Calcium,Ionized--  Blood Urea Nitrogen, Serum 10 mg/dL [10 - 20]  Carbon Dioxide, Serum27 mmol/L [17 - 32]  Chloride, Serum97 mmol/L<L> [98 - 110]  Creatinie, Serum1.0 mg/dL [0.7 - 1.5]  Glucose, Bqcxn010 mg/dL<H> [70 - 99]  Potassium, Serum4.1 mmol/L [3.5 - 5.0]  Sodium, Serum 141 mmol/L [135 - 146]  [facility]  01-13-22 @ 04:30  Blood Gas Arterial-Calcium,Ionized--  Blood Urea Nitrogen, Serum 8 mg/dL<L> [10 - 20]  Carbon Dioxide, Serum24 mmol/L [17 - 32]  Chloride, Serum99 mmol/L [98 - 110]  Creatinie, Serum1.0 mg/dL [0.7 - 1.5]  Glucose, Dugup815 mg/dL<H> [70 - 99]  Potassium, Serum3.5 mmol/L [3.5 - 5.0]  Sodium, Serum 141 mmol/L [135 - 146]      Medications:  acetaminophen     Tablet .. 650 milliGRAM(s) Oral every 6 hours PRN  ALBUTerol    90 MICROgram(s) HFA Inhaler 2 Puff(s) Inhalation every 6 hours PRN  aluminum hydroxide/magnesium hydroxide/simethicone Suspension 30 milliLiter(s) Oral every 4 hours PRN  aspirin enteric coated 81 milliGRAM(s) Oral daily  atorvastatin 80 milliGRAM(s) Oral at bedtime  budesonide 160 MICROgram(s)/formoterol 4.5 MICROgram(s) Inhaler 2 Puff(s) Inhalation two times a day  chlorhexidine 4% Liquid 1 Application(s) Topical <User Schedule>  clopidogrel Tablet 75 milliGRAM(s) Oral daily  enoxaparin Injectable 40 milliGRAM(s) SubCutaneous daily  fenofibrate Tablet 145 milliGRAM(s) Oral daily  guaifenesin/dextromethorphan Oral Liquid 15 milliLiter(s) Oral every 6 hours PRN  influenza   Vaccine 0.5 milliLiter(s) IntraMuscular once  magnesium oxide 400 milliGRAM(s) Oral three times a day with meals  melatonin 3 milliGRAM(s) Oral at bedtime PRN  metoprolol succinate ER 50 milliGRAM(s) Oral daily  ondansetron Injectable 4 milliGRAM(s) IV Push every 8 hours PRN  pantoprazole    Tablet 40 milliGRAM(s) Oral before breakfast  QUEtiapine 25 milliGRAM(s) Oral at bedtime  sacubitril 49 mG/valsartan 51 mG 1 Tablet(s) Oral two times a day  spironolactone 25 milliGRAM(s) Oral daily  torsemide 20 milliGRAM(s) Oral two times a day        Assessment and Plan:  Patient is a 56 year old female patient with PMH of  COPD on home O2, CORNELIUS on CPAP, CHFrEF (15-20%) , severe pulmonary htn, CAD s/p PCI to RCA, HTN, DL/CVA with residual left LE weakness, and DMII, active smoker, admitted with legs edema/dyspnea/pulmonary edema due to acute on chronic CHF decompensation. Covid PCR positive.    # Acute on chronic CHF decompensation with left ventricular systolic dysfunction  - patient improved clinically post IV diuresis , now transitioned to PO Trosemide 20 mg twice daily  -repeated CXR- decreased pulmonary congestion b/l, legs edema has resolved.   - continue fluid restriction, daily weight monitoring, sacubitril, spironolactone, metoprolol tx.  - life vest, outpatient Cardiology eval for AICD     # Covid postive PCR  -patient is vaccinated, complete 10 days quarantine  - ferritin 248, CRP 6, ddimer 229  - no medical tx.     # Acute on chronic hypoxic respiratory failure due to CHF decompensation with pulmonary congestion   -patient as home using 5 L , now on 6 L - will taper to home dose    # COPD- continue inhalers tx.    h/o HTN/CVA/Dyslipidemia/CAD/ DM 2- stable - resumed on home meds tx.     #Progress Note Handoff: Patient was medically optimized ,stable for d/c home   Family discussion: d/c plan d/w pt. by bedside Disposition: Home___today     Total time spent to complete patient's bedside assessment, review medical chart, discuss medical plan of care with covering medical team was more than 35 minutes

## 2022-01-15 VITALS
TEMPERATURE: 96 F | SYSTOLIC BLOOD PRESSURE: 111 MMHG | RESPIRATION RATE: 18 BRPM | WEIGHT: 187.83 LBS | HEART RATE: 73 BPM | DIASTOLIC BLOOD PRESSURE: 77 MMHG

## 2022-01-15 LAB
GLUCOSE BLDC GLUCOMTR-MCNC: 151 MG/DL — HIGH (ref 70–99)
GLUCOSE BLDC GLUCOMTR-MCNC: 192 MG/DL — HIGH (ref 70–99)

## 2022-01-15 RX ADMIN — Medication 20 MILLIGRAM(S): at 05:36

## 2022-01-15 RX ADMIN — Medication 50 MILLIGRAM(S): at 05:36

## 2022-01-15 RX ADMIN — PANTOPRAZOLE SODIUM 40 MILLIGRAM(S): 20 TABLET, DELAYED RELEASE ORAL at 05:36

## 2022-01-15 RX ADMIN — SPIRONOLACTONE 25 MILLIGRAM(S): 25 TABLET, FILM COATED ORAL at 05:36

## 2022-01-15 RX ADMIN — SACUBITRIL AND VALSARTAN 1 TABLET(S): 24; 26 TABLET, FILM COATED ORAL at 05:36

## 2022-01-15 RX ADMIN — ENOXAPARIN SODIUM 40 MILLIGRAM(S): 100 INJECTION SUBCUTANEOUS at 11:34

## 2022-01-15 RX ADMIN — BUDESONIDE AND FORMOTEROL FUMARATE DIHYDRATE 2 PUFF(S): 160; 4.5 AEROSOL RESPIRATORY (INHALATION) at 08:28

## 2022-01-15 RX ADMIN — Medication 81 MILLIGRAM(S): at 11:34

## 2022-01-15 RX ADMIN — MAGNESIUM OXIDE 400 MG ORAL TABLET 400 MILLIGRAM(S): 241.3 TABLET ORAL at 11:33

## 2022-01-15 RX ADMIN — CLOPIDOGREL BISULFATE 75 MILLIGRAM(S): 75 TABLET, FILM COATED ORAL at 11:34

## 2022-01-15 RX ADMIN — CHLORHEXIDINE GLUCONATE 1 APPLICATION(S): 213 SOLUTION TOPICAL at 05:58

## 2022-01-15 RX ADMIN — Medication 145 MILLIGRAM(S): at 11:34

## 2022-01-15 RX ADMIN — MAGNESIUM OXIDE 400 MG ORAL TABLET 400 MILLIGRAM(S): 241.3 TABLET ORAL at 08:27

## 2022-01-20 DIAGNOSIS — E83.42 HYPOMAGNESEMIA: ICD-10-CM

## 2022-01-20 DIAGNOSIS — I25.10 ATHEROSCLEROTIC HEART DISEASE OF NATIVE CORONARY ARTERY WITHOUT ANGINA PECTORIS: ICD-10-CM

## 2022-01-20 DIAGNOSIS — J44.1 CHRONIC OBSTRUCTIVE PULMONARY DISEASE WITH (ACUTE) EXACERBATION: ICD-10-CM

## 2022-01-20 DIAGNOSIS — Z95.5 PRESENCE OF CORONARY ANGIOPLASTY IMPLANT AND GRAFT: ICD-10-CM

## 2022-01-20 DIAGNOSIS — J96.11 CHRONIC RESPIRATORY FAILURE WITH HYPOXIA: ICD-10-CM

## 2022-01-20 DIAGNOSIS — I27.29 OTHER SECONDARY PULMONARY HYPERTENSION: ICD-10-CM

## 2022-01-20 DIAGNOSIS — E78.5 HYPERLIPIDEMIA, UNSPECIFIED: ICD-10-CM

## 2022-01-20 DIAGNOSIS — U07.1 COVID-19: ICD-10-CM

## 2022-01-20 DIAGNOSIS — E11.22 TYPE 2 DIABETES MELLITUS WITH DIABETIC CHRONIC KIDNEY DISEASE: ICD-10-CM

## 2022-01-20 DIAGNOSIS — I31.3 PERICARDIAL EFFUSION (NONINFLAMMATORY): ICD-10-CM

## 2022-01-20 DIAGNOSIS — I13.0 HYPERTENSIVE HEART AND CHRONIC KIDNEY DISEASE WITH HEART FAILURE AND STAGE 1 THROUGH STAGE 4 CHRONIC KIDNEY DISEASE, OR UNSPECIFIED CHRONIC KIDNEY DISEASE: ICD-10-CM

## 2022-01-20 DIAGNOSIS — E87.70 FLUID OVERLOAD, UNSPECIFIED: ICD-10-CM

## 2022-01-20 DIAGNOSIS — G47.33 OBSTRUCTIVE SLEEP APNEA (ADULT) (PEDIATRIC): ICD-10-CM

## 2022-01-20 DIAGNOSIS — N18.2 CHRONIC KIDNEY DISEASE, STAGE 2 (MILD): ICD-10-CM

## 2022-01-20 DIAGNOSIS — F17.210 NICOTINE DEPENDENCE, CIGARETTES, UNCOMPLICATED: ICD-10-CM

## 2022-01-20 DIAGNOSIS — I50.23 ACUTE ON CHRONIC SYSTOLIC (CONGESTIVE) HEART FAILURE: ICD-10-CM

## 2022-01-20 DIAGNOSIS — G81.94 HEMIPLEGIA, UNSPECIFIED AFFECTING LEFT NONDOMINANT SIDE: ICD-10-CM

## 2022-02-06 ENCOUNTER — INPATIENT (INPATIENT)
Facility: HOSPITAL | Age: 57
LOS: 9 days | Discharge: HOME HEALTH | End: 2022-02-16
Attending: INTERNAL MEDICINE | Admitting: INTERNAL MEDICINE
Payer: MEDICARE

## 2022-02-06 VITALS
HEART RATE: 115 BPM | RESPIRATION RATE: 22 BRPM | SYSTOLIC BLOOD PRESSURE: 140 MMHG | OXYGEN SATURATION: 98 % | TEMPERATURE: 99 F | WEIGHT: 199.96 LBS | DIASTOLIC BLOOD PRESSURE: 100 MMHG | HEIGHT: 56 IN

## 2022-02-06 DIAGNOSIS — Z91.128 PATIENT'S INTENTIONAL UNDERDOSING OF MEDICATION REGIMEN FOR OTHER REASON: ICD-10-CM

## 2022-02-06 DIAGNOSIS — T50.1X6A UNDERDOSING OF LOOP [HIGH-CEILING] DIURETICS, INITIAL ENCOUNTER: ICD-10-CM

## 2022-02-06 DIAGNOSIS — Z86.16 PERSONAL HISTORY OF COVID-19: ICD-10-CM

## 2022-02-06 DIAGNOSIS — E11.22 TYPE 2 DIABETES MELLITUS WITH DIABETIC CHRONIC KIDNEY DISEASE: ICD-10-CM

## 2022-02-06 DIAGNOSIS — I31.3 PERICARDIAL EFFUSION (NONINFLAMMATORY): ICD-10-CM

## 2022-02-06 DIAGNOSIS — I08.3 COMBINED RHEUMATIC DISORDERS OF MITRAL, AORTIC AND TRICUSPID VALVES: ICD-10-CM

## 2022-02-06 DIAGNOSIS — F41.8 OTHER SPECIFIED ANXIETY DISORDERS: ICD-10-CM

## 2022-02-06 DIAGNOSIS — E78.5 HYPERLIPIDEMIA, UNSPECIFIED: ICD-10-CM

## 2022-02-06 DIAGNOSIS — I50.23 ACUTE ON CHRONIC SYSTOLIC (CONGESTIVE) HEART FAILURE: ICD-10-CM

## 2022-02-06 DIAGNOSIS — Z79.84 LONG TERM (CURRENT) USE OF ORAL HYPOGLYCEMIC DRUGS: ICD-10-CM

## 2022-02-06 DIAGNOSIS — J96.21 ACUTE AND CHRONIC RESPIRATORY FAILURE WITH HYPOXIA: ICD-10-CM

## 2022-02-06 DIAGNOSIS — R00.0 TACHYCARDIA, UNSPECIFIED: ICD-10-CM

## 2022-02-06 DIAGNOSIS — Z79.02 LONG TERM (CURRENT) USE OF ANTITHROMBOTICS/ANTIPLATELETS: ICD-10-CM

## 2022-02-06 DIAGNOSIS — Z20.822 CONTACT WITH AND (SUSPECTED) EXPOSURE TO COVID-19: ICD-10-CM

## 2022-02-06 DIAGNOSIS — I10 ESSENTIAL (PRIMARY) HYPERTENSION: ICD-10-CM

## 2022-02-06 DIAGNOSIS — E11.51 TYPE 2 DIABETES MELLITUS WITH DIABETIC PERIPHERAL ANGIOPATHY WITHOUT GANGRENE: ICD-10-CM

## 2022-02-06 DIAGNOSIS — I13.0 HYPERTENSIVE HEART AND CHRONIC KIDNEY DISEASE WITH HEART FAILURE AND STAGE 1 THROUGH STAGE 4 CHRONIC KIDNEY DISEASE, OR UNSPECIFIED CHRONIC KIDNEY DISEASE: ICD-10-CM

## 2022-02-06 DIAGNOSIS — Z98.61 CORONARY ANGIOPLASTY STATUS: ICD-10-CM

## 2022-02-06 DIAGNOSIS — J96.22 ACUTE AND CHRONIC RESPIRATORY FAILURE WITH HYPERCAPNIA: ICD-10-CM

## 2022-02-06 DIAGNOSIS — I27.20 PULMONARY HYPERTENSION, UNSPECIFIED: ICD-10-CM

## 2022-02-06 DIAGNOSIS — Z79.82 LONG TERM (CURRENT) USE OF ASPIRIN: ICD-10-CM

## 2022-02-06 DIAGNOSIS — J44.9 CHRONIC OBSTRUCTIVE PULMONARY DISEASE, UNSPECIFIED: ICD-10-CM

## 2022-02-06 DIAGNOSIS — Z91.11 PATIENT'S NONCOMPLIANCE WITH DIETARY REGIMEN: ICD-10-CM

## 2022-02-06 DIAGNOSIS — Z87.891 PERSONAL HISTORY OF NICOTINE DEPENDENCE: ICD-10-CM

## 2022-02-06 DIAGNOSIS — I69.354 HEMIPLEGIA AND HEMIPARESIS FOLLOWING CEREBRAL INFARCTION AFFECTING LEFT NON-DOMINANT SIDE: ICD-10-CM

## 2022-02-06 DIAGNOSIS — Z99.81 DEPENDENCE ON SUPPLEMENTAL OXYGEN: ICD-10-CM

## 2022-02-06 DIAGNOSIS — R77.8 OTHER SPECIFIED ABNORMALITIES OF PLASMA PROTEINS: ICD-10-CM

## 2022-02-06 DIAGNOSIS — G47.33 OBSTRUCTIVE SLEEP APNEA (ADULT) (PEDIATRIC): ICD-10-CM

## 2022-02-06 DIAGNOSIS — I25.10 ATHEROSCLEROTIC HEART DISEASE OF NATIVE CORONARY ARTERY WITHOUT ANGINA PECTORIS: ICD-10-CM

## 2022-02-06 DIAGNOSIS — N18.2 CHRONIC KIDNEY DISEASE, STAGE 2 (MILD): ICD-10-CM

## 2022-02-06 LAB
ALBUMIN SERPL ELPH-MCNC: 3.9 G/DL — SIGNIFICANT CHANGE UP (ref 3.5–5.2)
ALP SERPL-CCNC: 102 U/L — SIGNIFICANT CHANGE UP (ref 30–115)
ALT FLD-CCNC: 9 U/L — SIGNIFICANT CHANGE UP (ref 0–41)
ANION GAP SERPL CALC-SCNC: 21 MMOL/L — HIGH (ref 7–14)
AST SERPL-CCNC: 18 U/L — SIGNIFICANT CHANGE UP (ref 0–41)
BASE EXCESS BLDV CALC-SCNC: 2.7 MMOL/L — SIGNIFICANT CHANGE UP (ref -2–3)
BASE EXCESS BLDV CALC-SCNC: 3.8 MMOL/L — HIGH (ref -2–3)
BASOPHILS # BLD AUTO: 0.1 K/UL — SIGNIFICANT CHANGE UP (ref 0–0.2)
BASOPHILS NFR BLD AUTO: 1.5 % — HIGH (ref 0–1)
BILIRUB SERPL-MCNC: 2.4 MG/DL — HIGH (ref 0.2–1.2)
BUN SERPL-MCNC: 16 MG/DL — SIGNIFICANT CHANGE UP (ref 10–20)
CA-I SERPL-SCNC: 1.15 MMOL/L — SIGNIFICANT CHANGE UP (ref 1.15–1.33)
CA-I SERPL-SCNC: 1.16 MMOL/L — SIGNIFICANT CHANGE UP (ref 1.15–1.33)
CALCIUM SERPL-MCNC: 9.1 MG/DL — SIGNIFICANT CHANGE UP (ref 8.5–10.1)
CHLORIDE SERPL-SCNC: 96 MMOL/L — LOW (ref 98–110)
CO2 SERPL-SCNC: 20 MMOL/L — SIGNIFICANT CHANGE UP (ref 17–32)
CREAT SERPL-MCNC: 1 MG/DL — SIGNIFICANT CHANGE UP (ref 0.7–1.5)
EOSINOPHIL # BLD AUTO: 0.05 K/UL — SIGNIFICANT CHANGE UP (ref 0–0.7)
EOSINOPHIL NFR BLD AUTO: 0.8 % — SIGNIFICANT CHANGE UP (ref 0–8)
GAS PNL BLDV: 132 MMOL/L — LOW (ref 136–145)
GAS PNL BLDV: 135 MMOL/L — LOW (ref 136–145)
GAS PNL BLDV: SIGNIFICANT CHANGE UP
GAS PNL BLDV: SIGNIFICANT CHANGE UP
GLUCOSE BLDC GLUCOMTR-MCNC: 125 MG/DL — HIGH (ref 70–99)
GLUCOSE BLDC GLUCOMTR-MCNC: 169 MG/DL — HIGH (ref 70–99)
GLUCOSE SERPL-MCNC: 164 MG/DL — HIGH (ref 70–99)
HCO3 BLDV-SCNC: 28 MMOL/L — SIGNIFICANT CHANGE UP (ref 22–29)
HCO3 BLDV-SCNC: 31 MMOL/L — HIGH (ref 22–29)
HCT VFR BLD CALC: 44.7 % — SIGNIFICANT CHANGE UP (ref 37–47)
HCT VFR BLDA CALC: 41 % — SIGNIFICANT CHANGE UP (ref 34.5–46.5)
HCT VFR BLDA CALC: 41 % — SIGNIFICANT CHANGE UP (ref 34.5–46.5)
HGB BLD CALC-MCNC: 13.5 G/DL — SIGNIFICANT CHANGE UP (ref 11.7–16.1)
HGB BLD CALC-MCNC: 13.7 G/DL — SIGNIFICANT CHANGE UP (ref 11.7–16.1)
HGB BLD-MCNC: 13.8 G/DL — SIGNIFICANT CHANGE UP (ref 12–16)
IMM GRANULOCYTES NFR BLD AUTO: 0.5 % — HIGH (ref 0.1–0.3)
LACTATE BLDV-MCNC: 2.6 MMOL/L — HIGH (ref 0.5–2)
LACTATE BLDV-MCNC: 3.7 MMOL/L — HIGH (ref 0.5–2)
LYMPHOCYTES # BLD AUTO: 1.3 K/UL — SIGNIFICANT CHANGE UP (ref 1.2–3.4)
LYMPHOCYTES # BLD AUTO: 19.5 % — LOW (ref 20.5–51.1)
MAGNESIUM SERPL-MCNC: 1.5 MG/DL — LOW (ref 1.8–2.4)
MCHC RBC-ENTMCNC: 28.4 PG — SIGNIFICANT CHANGE UP (ref 27–31)
MCHC RBC-ENTMCNC: 30.9 G/DL — LOW (ref 32–37)
MCV RBC AUTO: 92 FL — SIGNIFICANT CHANGE UP (ref 81–99)
MONOCYTES # BLD AUTO: 0.47 K/UL — SIGNIFICANT CHANGE UP (ref 0.1–0.6)
MONOCYTES NFR BLD AUTO: 7.1 % — SIGNIFICANT CHANGE UP (ref 1.7–9.3)
NEUTROPHILS # BLD AUTO: 4.7 K/UL — SIGNIFICANT CHANGE UP (ref 1.4–6.5)
NEUTROPHILS NFR BLD AUTO: 70.6 % — SIGNIFICANT CHANGE UP (ref 42.2–75.2)
NRBC # BLD: 0 /100 WBCS — SIGNIFICANT CHANGE UP (ref 0–0)
NT-PROBNP SERPL-SCNC: 4892 PG/ML — HIGH (ref 0–300)
PCO2 BLDV: 44 MMHG — HIGH (ref 39–42)
PCO2 BLDV: 56 MMHG — HIGH (ref 39–42)
PH BLDV: 7.35 — SIGNIFICANT CHANGE UP (ref 7.32–7.43)
PH BLDV: 7.41 — SIGNIFICANT CHANGE UP (ref 7.32–7.43)
PLATELET # BLD AUTO: 269 K/UL — SIGNIFICANT CHANGE UP (ref 130–400)
PO2 BLDV: 44 MMHG — SIGNIFICANT CHANGE UP
PO2 BLDV: 52 MMHG — SIGNIFICANT CHANGE UP
POTASSIUM BLDV-SCNC: 3.5 MMOL/L — SIGNIFICANT CHANGE UP (ref 3.5–5.1)
POTASSIUM BLDV-SCNC: 3.8 MMOL/L — SIGNIFICANT CHANGE UP (ref 3.5–5.1)
POTASSIUM SERPL-MCNC: 3.9 MMOL/L — SIGNIFICANT CHANGE UP (ref 3.5–5)
POTASSIUM SERPL-SCNC: 3.9 MMOL/L — SIGNIFICANT CHANGE UP (ref 3.5–5)
PROT SERPL-MCNC: 7.2 G/DL — SIGNIFICANT CHANGE UP (ref 6–8)
RBC # BLD: 4.86 M/UL — SIGNIFICANT CHANGE UP (ref 4.2–5.4)
RBC # FLD: 21.8 % — HIGH (ref 11.5–14.5)
SAO2 % BLDV: 68.4 % — SIGNIFICANT CHANGE UP
SAO2 % BLDV: 82.7 % — SIGNIFICANT CHANGE UP
SARS-COV-2 RNA SPEC QL NAA+PROBE: SIGNIFICANT CHANGE UP
SODIUM SERPL-SCNC: 137 MMOL/L — SIGNIFICANT CHANGE UP (ref 135–146)
TROPONIN T SERPL-MCNC: 0.02 NG/ML — HIGH
WBC # BLD: 6.65 K/UL — SIGNIFICANT CHANGE UP (ref 4.8–10.8)
WBC # FLD AUTO: 6.65 K/UL — SIGNIFICANT CHANGE UP (ref 4.8–10.8)

## 2022-02-06 PROCEDURE — 93010 ELECTROCARDIOGRAM REPORT: CPT

## 2022-02-06 PROCEDURE — 99223 1ST HOSP IP/OBS HIGH 75: CPT

## 2022-02-06 PROCEDURE — 93308 TTE F-UP OR LMTD: CPT | Mod: 26

## 2022-02-06 PROCEDURE — 71275 CT ANGIOGRAPHY CHEST: CPT | Mod: 26,MA

## 2022-02-06 PROCEDURE — 99291 CRITICAL CARE FIRST HOUR: CPT | Mod: 25

## 2022-02-06 PROCEDURE — 76604 US EXAM CHEST: CPT | Mod: 26

## 2022-02-06 PROCEDURE — 71045 X-RAY EXAM CHEST 1 VIEW: CPT | Mod: 26

## 2022-02-06 PROCEDURE — 93306 TTE W/DOPPLER COMPLETE: CPT | Mod: 26

## 2022-02-06 RX ORDER — MAGNESIUM SULFATE 500 MG/ML
2 VIAL (ML) INJECTION ONCE
Refills: 0 | Status: COMPLETED | OUTPATIENT
Start: 2022-02-06 | End: 2022-02-06

## 2022-02-06 RX ORDER — SACUBITRIL AND VALSARTAN 24; 26 MG/1; MG/1
1 TABLET, FILM COATED ORAL
Refills: 0 | Status: DISCONTINUED | OUTPATIENT
Start: 2022-02-06 | End: 2022-02-14

## 2022-02-06 RX ORDER — QUETIAPINE FUMARATE 200 MG/1
25 TABLET, FILM COATED ORAL AT BEDTIME
Refills: 0 | Status: DISCONTINUED | OUTPATIENT
Start: 2022-02-06 | End: 2022-02-16

## 2022-02-06 RX ORDER — FUROSEMIDE 40 MG
40 TABLET ORAL DAILY
Refills: 0 | Status: DISCONTINUED | OUTPATIENT
Start: 2022-02-07 | End: 2022-02-07

## 2022-02-06 RX ORDER — MAGNESIUM OXIDE 400 MG ORAL TABLET 241.3 MG
400 TABLET ORAL
Refills: 0 | Status: DISCONTINUED | OUTPATIENT
Start: 2022-02-06 | End: 2022-02-16

## 2022-02-06 RX ORDER — FENOFIBRATE,MICRONIZED 130 MG
145 CAPSULE ORAL DAILY
Refills: 0 | Status: DISCONTINUED | OUTPATIENT
Start: 2022-02-06 | End: 2022-02-16

## 2022-02-06 RX ORDER — ATORVASTATIN CALCIUM 80 MG/1
80 TABLET, FILM COATED ORAL AT BEDTIME
Refills: 0 | Status: DISCONTINUED | OUTPATIENT
Start: 2022-02-06 | End: 2022-02-16

## 2022-02-06 RX ORDER — METOPROLOL TARTRATE 50 MG
50 TABLET ORAL DAILY
Refills: 0 | Status: DISCONTINUED | OUTPATIENT
Start: 2022-02-06 | End: 2022-02-16

## 2022-02-06 RX ORDER — CLOPIDOGREL BISULFATE 75 MG/1
75 TABLET, FILM COATED ORAL DAILY
Refills: 0 | Status: DISCONTINUED | OUTPATIENT
Start: 2022-02-06 | End: 2022-02-16

## 2022-02-06 RX ORDER — INSULIN LISPRO 100/ML
VIAL (ML) SUBCUTANEOUS
Refills: 0 | Status: DISCONTINUED | OUTPATIENT
Start: 2022-02-06 | End: 2022-02-16

## 2022-02-06 RX ORDER — FUROSEMIDE 40 MG
40 TABLET ORAL ONCE
Refills: 0 | Status: COMPLETED | OUTPATIENT
Start: 2022-02-06 | End: 2022-02-06

## 2022-02-06 RX ORDER — BUDESONIDE AND FORMOTEROL FUMARATE DIHYDRATE 160; 4.5 UG/1; UG/1
2 AEROSOL RESPIRATORY (INHALATION)
Refills: 0 | Status: DISCONTINUED | OUTPATIENT
Start: 2022-02-06 | End: 2022-02-16

## 2022-02-06 RX ORDER — INFLUENZA VIRUS VACCINE 15; 15; 15; 15 UG/.5ML; UG/.5ML; UG/.5ML; UG/.5ML
0.5 SUSPENSION INTRAMUSCULAR ONCE
Refills: 0 | Status: DISCONTINUED | OUTPATIENT
Start: 2022-02-06 | End: 2022-02-16

## 2022-02-06 RX ORDER — ALBUTEROL 90 UG/1
2 AEROSOL, METERED ORAL EVERY 6 HOURS
Refills: 0 | Status: DISCONTINUED | OUTPATIENT
Start: 2022-02-06 | End: 2022-02-16

## 2022-02-06 RX ORDER — SPIRONOLACTONE 25 MG/1
25 TABLET, FILM COATED ORAL DAILY
Refills: 0 | Status: DISCONTINUED | OUTPATIENT
Start: 2022-02-06 | End: 2022-02-16

## 2022-02-06 RX ORDER — ASPIRIN/CALCIUM CARB/MAGNESIUM 324 MG
81 TABLET ORAL DAILY
Refills: 0 | Status: DISCONTINUED | OUTPATIENT
Start: 2022-02-06 | End: 2022-02-16

## 2022-02-06 RX ORDER — ENOXAPARIN SODIUM 100 MG/ML
40 INJECTION SUBCUTANEOUS DAILY
Refills: 0 | Status: DISCONTINUED | OUTPATIENT
Start: 2022-02-06 | End: 2022-02-16

## 2022-02-06 RX ORDER — PANTOPRAZOLE SODIUM 20 MG/1
40 TABLET, DELAYED RELEASE ORAL
Refills: 0 | Status: DISCONTINUED | OUTPATIENT
Start: 2022-02-06 | End: 2022-02-16

## 2022-02-06 RX ADMIN — Medication 50 MILLIGRAM(S): at 17:21

## 2022-02-06 RX ADMIN — BUDESONIDE AND FORMOTEROL FUMARATE DIHYDRATE 2 PUFF(S): 160; 4.5 AEROSOL RESPIRATORY (INHALATION) at 21:17

## 2022-02-06 RX ADMIN — QUETIAPINE FUMARATE 25 MILLIGRAM(S): 200 TABLET, FILM COATED ORAL at 21:17

## 2022-02-06 RX ADMIN — SACUBITRIL AND VALSARTAN 1 TABLET(S): 24; 26 TABLET, FILM COATED ORAL at 17:21

## 2022-02-06 RX ADMIN — Medication 2 GRAM(S): at 16:26

## 2022-02-06 RX ADMIN — Medication 25 GRAM(S): at 13:16

## 2022-02-06 RX ADMIN — Medication 40 MILLIGRAM(S): at 13:36

## 2022-02-06 RX ADMIN — MAGNESIUM OXIDE 400 MG ORAL TABLET 400 MILLIGRAM(S): 241.3 TABLET ORAL at 17:21

## 2022-02-06 RX ADMIN — ATORVASTATIN CALCIUM 80 MILLIGRAM(S): 80 TABLET, FILM COATED ORAL at 21:17

## 2022-02-06 NOTE — ED ADULT NURSE REASSESSMENT NOTE - NS ED NURSE REASSESS COMMENT FT1
Received patient from main admitted to telemetry with ST with oxygen 4 liter with generalized swelling +2 to lower extremities . patient denies any pain surgery at bedside awaiting bed continue to monitor patient.

## 2022-02-06 NOTE — H&P ADULT - NSICDXFAMILYHX_GEN_ALL_CORE_FT
FAMILY HISTORY:  No pertinent family history in first degree relatives FAMILY HISTORY:  Father  Still living? Unknown  FH: diabetes mellitus, Age at diagnosis: Age Unknown    Mother  Still living? Unknown  FH: diabetes mellitus, Age at diagnosis: Age Unknown

## 2022-02-06 NOTE — ED ADULT NURSE NOTE - ED STAT RN HANDOFF DETAILS 2
Patient admitted to telemetry assigned to F9 report given to AMBER Sanon . Patient to be transported with oxygen and cardiac monitor by RN and transported stable in no acute distress safety maintained.

## 2022-02-06 NOTE — H&P ADULT - NSHPLABSRESULTS_GEN_ALL_CORE
VITAL SIGNS: Last 24 Hours  T(C): 37.7 (06 Feb 2022 10:46), Max: 37.7 (06 Feb 2022 10:46)  T(F): 99.8 (06 Feb 2022 10:46), Max: 99.8 (06 Feb 2022 10:46)  HR: 117 (06 Feb 2022 16:00) (114 - 121)  BP: 116/72 (06 Feb 2022 16:00) (113/84 - 142/89)  BP(mean): --  RR: 20 (06 Feb 2022 16:00) (20 - 22)  SpO2: 98% (06 Feb 2022 16:00) (96% - 100%)    LABS:                        13.8   6.65  )-----------( 269      ( 06 Feb 2022 10:50 )             44.7     02-06    137  |  96<L>  |  16  ----------------------------<  164<H>  3.9   |  20  |  1.0    Ca    9.1      06 Feb 2022 10:50  Mg     1.5     02-06    TPro  7.2  /  Alb  3.9  /  TBili  2.4<H>  /  DBili  x   /  AST  18  /  ALT  9   /  AlkPhos  102  02-06          Troponin T, Serum: 0.02 ng/mL *H* (02-06-22 @ 10:50)      CARDIAC MARKERS ( 06 Feb 2022 10:50 )  x     / 0.02 ng/mL / x     / x     / x          RADIOLOGY:    < from: CT Angio Chest PE Protocol w/ IV Cont (02.06.22 @ 14:46) >    1.  No pulmonary embolus.    2.  Cardiomegaly with asymmetric right heart dilatation. Mild/moderate   pericardial effusion, slightly increased since prior. Consider   correlation with echocardiography as clinically warranted.    3.  Small bilateral pleural effusions, slightly increased since prior.   Trace perihepatic ascites, partially imaged.    < end of copied text >    < from: Xray Chest 1 View-PORTABLE IMMEDIATE (02.06.22 @ 12:03) >    1. Mild pulmonary vascular congestion and interstitial edema.  2. Stable cardiomegaly.    < end of copied text >

## 2022-02-06 NOTE — ED PROVIDER NOTE - OBJECTIVE STATEMENT
56F PMHx COPD on 3L home O2, CORNELIUS on CPAP, HFrEF (15-20%), severe pulmonary HTN, CAD s/p PCI to RCA, HTN, HLD, CVA with residual LLE weakness, T2DM, recently quit smoking presents for shortness of breath that worsened last night. Per EMS, pt was put on NRB 10L and one albuterol treatment with improvement en route. Denies fever/chills. Reports chronic shortness of breath and cough productive for phlegm. No chest pain, palpitations, abd pain, n/v/d, numbness, tingling, focal weakness, headache, neck pain. Pt wheelchair bound at baseline. Recently quit smoking. Reports chronic bl leg swelling from HF, not worsened than usual. 56F PMHx COPD on 3L home O2, CORNELIUS on CPAP, HFrEF (15-20%), severe pulmonary HTN, CAD s/p PCI to RCA, HTN, HLD, CVA with residual LLE weakness, T2DM, recently quit smoking presents for shortness of breath that worsened last night. Per EMS, pt was put on NRB 10L and one albuterol treatment with improvement en route. Pt was recently admitted in Jan 2021 for bl lower extremity edema, was COVID positive at the time. Reports vaccinated against COVID. Denies fever/chills. Reports chronic shortness of breath and cough productive for phlegm. No chest pain, palpitations, abd pain, n/v/d, numbness, tingling, focal weakness, headache, neck pain. Pt wheelchair bound at baseline. Recently quit smoking. Reports chronic bl leg swelling from HF, not worsened than usual. 56F PMHx COPD on 3L home O2, CORNELIUS on CPAP, HFrEF (15-20% on TTE 11/2021), severe pulmonary HTN, CAD s/p PCI to RCA, HTN, HLD, CVA with residual LLE weakness, T2DM, recently quit smoking presents for shortness of breath that worsened last night. Per EMS, pt was put on NRB 10L and one albuterol treatment with improvement en route. Pt was recently admitted in Jan 2021 for bl lower extremity edema, was COVID positive at the time. Reports vaccinated against COVID. Denies fever/chills. Reports chronic shortness of breath and cough productive for phlegm. No chest pain, palpitations, abd pain, n/v/d, numbness, tingling, focal weakness, headache, neck pain. Pt wheelchair bound at baseline. Recently quit smoking. Reports chronic bl leg swelling from HF, not worsened than usual.

## 2022-02-06 NOTE — H&P ADULT - ASSESSMENT
#Dyspnea; multifactorial   #Hx of HFrEF  #Hx of COPD on home O2  #Hx of severe pul-HTN  #Mild-mod pericardial effusion   - CT Angio Chest PE Protocol w/ IV Cont: No PE. Cardiomegaly with asymmetric right heart dilatation. Mild/moderate pericardial effusion, slightly increased since prior. Small bilateral pleural effusions, slightly increased since prior.   - TTE 11/14/2021: EF 15-20%, G2DD, mod MR, mild-mod TR, severe P-HTN, small pericardial effusion. Follow repeat Echo  - s/p 40mg IV lasix in the ED. Continue IV for now  - Troponin 0.02; around baseline. Follow repeat   - Daily weight. Strict I & Os. Keep I < O. Fluid restriction  - continue Toprol XL, Entresto and spironolactone    #CAD s/p PCI to RCA - Continue ASA 81mg PO QD, Plavix 75mg PO QD, Metoprolol     #CORNELIUS on CPAP - CPAP at night     #Hx of CVA with residual weakness   #HTN/DLD  - Continue atorvastatin aspirin, fenofibrate    #Diabetes Mellitus type II   - Last HbA1C 7.7 % (11-10-21)  - Will hold home medications  - Monitor FS. Start insulin if FS > 180. Keep between 140 - 180. Carb consistent diet    #Misc  - DVT Prophylaxis: Lovenox  - Diet: DASH/TLC, CC  - GI Prophylaxis: pantoprazole   - Activity: AAT  - Code Status: Full Code    Dispo: Admit to medicine  55 YO F w/ PMHx of COPD (on 3L home O2), CORNELIUS on CPAP, HFrEF (15-20% on TTE 11/2021), severe pulmonary HTN, CAD s/p PCI to RCA, HTN/HLD, CVA with residual LLE weakness, T2DM, recently quit smoking (1 month ago) presented for shortness of breath that worsened last night.     #Dyspnea; multifactorial   #Hx of HFrEF; BNP around baseline   #Hx of COPD on 3L home O2  #Hx of severe pul-HTN  #Mild-mod pericardial effusion; low probability of tamponade    - CT Angio Chest PE Protocol w/ IV Cont: No PE. Cardiomegaly with asymmetric right heart dilatation. Mild/moderate pericardial effusion, slightly increased since prior. Small bilateral pleural effusions, slightly increased since prior.   - TTE 11/14/2021: EF 15-20%, G2DD, mod MR, mild-mod TR, severe P-HTN, small pericardial effusion. Follow repeat Echo  - s/p 40mg IV lasix in the ED. Continue 40mg IV lasix for now  - Troponin 0.02; around baseline. Follow repeat   - Daily weight. Strict I & Os. Keep I < O. Fluid restriction  - Continue Metoprolol 50mg PO QD, Entresto 49-51 BID and spironolactone 25mg PO QD  - Will keep NPO after midnight in case of any intervention by surgery. Please d/c NPO order if no intervention planned by surgery team    #CAD s/p PCI to RCA - Continue ASA 81mg PO QD, Plavix 75mg PO QD, Metoprolol 50mg PO QD    #CORNELIUS on CPAP - CPAP at night     #Hx of CVA with residual weakness   #HTN/DLD  - Continue atorvastatin 80mg PO QD, ASA 81mg PO QD, Plavix 75mg PO QD, fenofibrate 145mg PO QD    #Diabetes Mellitus type II   - Last HbA1C 7.7 % (11-10-21)  - Will hold home medications  - Monitor FS. Start insulin if FS > 180. Keep between 140 - 180. Carb consistent diet    #Misc  - DVT Prophylaxis: Lovenox  - Diet: DASH/TLC, CC, fluid restriction  - GI Prophylaxis: pantoprazole   - Activity: AAT  - Code Status: Full Code    Dispo: Admit to medicine  55 YO F w/ PMHx of COPD (on 3L home O2), CORNELIUS on CPAP, HFrEF (15-20% on TTE 11/2021), severe pulmonary HTN, CAD s/p PCI to RCA, HTN/HLD, CVA with residual LLE weakness, T2DM, recently quit smoking (1 month ago) presented for shortness of breath that worsened last night.     #Dyspnea; multifactorial   #Hx of HFrEF; BNP around baseline   #Hx of COPD on 3L home O2  #Hx of severe pul-HTN  #Mild-mod pericardial effusion; low probability of tamponade    - CT Angio Chest PE Protocol w/ IV Cont: No PE. Cardiomegaly with asymmetric right heart dilatation. Mild/moderate pericardial effusion, slightly increased since prior. Small bilateral pleural effusions, slightly increased since prior.   - TTE 11/14/2021: EF 15-20%, G2DD, mod MR, mild-mod TR, severe P-HTN, small pericardial effusion. Follow repeat Echo  - s/p 40mg IV lasix in the ED. Continue 40mg IV lasix for now  - Troponin 0.02; around baseline. Follow repeat   - Daily weight. Strict I & Os. Keep I < O. Fluid restriction  - Continue Metoprolol 50mg PO QD, Entresto 49-51 BID and spironolactone 25mg PO QD  - Will keep NPO after midnight in case of any intervention by surgery. Please d/c NPO order if no intervention planned by surgery team    #CAD s/p PCI to RCA - Continue ASA 81mg PO QD, Plavix 75mg PO QD, Metoprolol 50mg PO QD    #CORNELIUS on CPAP - CPAP at night     #Hx of CVA with residual weakness   #HTN/DLD  - Continue atorvastatin 80mg PO QD, ASA 81mg PO QD, Plavix 75mg PO QD, fenofibrate 145mg PO QD    #Diabetes Mellitus type II   - Last HbA1C 7.7 % (11-10-21)  - Will hold home medications  - Monitor FS. Start insulin if FS > 180. Keep between 140 - 180. Carb consistent diet    #Misc  - DVT Prophylaxis: Lovenox  - Diet: DASH/TLC, CC, fluid restriction  - GI Prophylaxis: pantoprazole   - Activity: AAT  - Code Status: Full Code    Dispo: Admit to tele

## 2022-02-06 NOTE — H&P ADULT - NSHPPHYSICALEXAM_GEN_ALL_CORE
CONSTITUTIONAL: No acute distress, well-developed, well-groomed, AAOx3  HEAD: Atraumatic, normocephalic  EYES: EOM intact, PERRLA, conjunctiva and sclera clear  ENT: Supple, no masses, no thyromegaly, no bruits, no JVD; moist mucous membranes  PULMONARY: Clear to auscultation bilaterally; no wheezes, rales, or rhonchi  CARDIOVASCULAR: Regular rate and rhythm; no murmurs, rubs, or gallops  GASTROINTESTINAL: Soft, non-tender, non-distended; bowel sounds present  MUSCULOSKELETAL: 2+ peripheral pulses; no clubbing, no cyanosis, no edema  NEUROLOGY: non-focal  SKIN: No rashes or lesions; warm and dry CONSTITUTIONAL: AAOx3  EYES: conjunctiva and sclera clear  ENT: moist mucous membranes  PULMONARY: Crackles b/l  CARDIOVASCULAR: Regular rate and rhythm; tachy  GASTROINTESTINAL: Soft, non-tender, non-distended  MUSCULOSKELETAL: 2+ LE edema  NEUROLOGY: LLE weakness

## 2022-02-06 NOTE — ED PROVIDER NOTE - PROGRESS NOTE DETAILS
Nilesh: pt tachypneic on NRB 10L, saturating 99%. Placed on BIPAP. Will continue to reassess. pt no distress on BIPAP pt no distress on bipap Nilesh: pt reassessed, comfortable on BIPAP satting 95-96% pulling  and above  Will trial off BIPAP to NC and reassess  Pending CTA PE study ATTENDING NOTE:  55 y/o F, HX COPD on 3L home O2, CORNELIUS on CPAP, HFrEF (15-20% on TTE 11/2021), severe pulmonary HTN, CAD s/p PCI to RCA, HTN, HLD, CVA with residual LLE weakness, T2DM, p/w SOB since last PM. Pt was recently admitted in January 2022 with acute heart failure/ COPD/ COVID. Here EMS reports the pt was hypoxic in the field and placed on nonrebreather and albuterol.   On my eval, pt resting comfortably on BIPAP machine, s1s2, b/l rails, s nt, 1+ edema b/l   Impression: undifferentiated SOB. Plan for echo, bedside lung US, XR shows mild pulmonary vascular, BNP lower than previous, will do CT angio of chest. I intend to get sono guided iv as pt with difficult access, I intend to get echo as pt with sob, I intend to get lung us as pt with sob pt off bipap Nilesh: pt discussed with ICU fellow Dr. Grajdea - pt stable for medical floor at this time.  Pt currently on 2LNC, in NAD, O2 sat stable.  CT PE study negative for PE, shows mild/moderate pericardial effusion, no sign of RHS on CT/POCUS echo. Nilesh: consulted CT surgery regarding percardial effusion  - spoke to ANGEL LUIS Cancino. will shanel pt  pt is admitted to [facility] wrap up note - 56-year-old female who presented for evaluation of shortness of breath.  Patient afebrile however in resp distress requiring BiPAP patient's respiratory status improved with patient eventually being taken off BiPAP on initial blood low BNP, there was no significant CO2 retention x-ray showed only mild pulmonary vascular congestion therefore a CT angio was done to rule out a PE. CT angio of the chest showed no pulmonary embolism but the patient had a mild to moderate pericardial effusion which increased since previous.  However based off bedside echo done here the patient is significantly reduced EF has a small pericardial fusion but no evidence of tamponade.  I believe the patient shortness of breath is due to multifactorial as patient has multiple respiratory and cardiac comorbidities.  Patient is mildly tachycardic but has a stable blood pressure and is in no distress admitted to telemetry unit for evaluation and treatment.  Case discussed with the ICU and at this time is not a candidate for intensive care unit monitoring and treatment.

## 2022-02-06 NOTE — ED PROCEDURE NOTE - US DIAGNOSIS
decreased EF/Grossly Decreased Function decreased EF/Grossly Decreased Function/Pericardial Effusion

## 2022-02-06 NOTE — ED PROVIDER NOTE - CARE PLAN
Principal Discharge DX:	Pericardial effusion  Secondary Diagnosis:	Hypoxia   1 Principal Discharge DX:	Pericardial effusion  Secondary Diagnosis:	Hypoxia  Secondary Diagnosis:	Pleural effusion

## 2022-02-06 NOTE — ED PROVIDER NOTE - PHYSICAL EXAMINATION
VITAL SIGNS: I have reviewed nursing notes and confirm.  CONSTITUTIONAL: chronically ill appearing female in stretcher on NRB in NAD  SKIN: Warm dry, normal skin turgor  HEAD: NCAT  EYES: EOMI, PERRL, no scleral icterus  ENT: Moist mucous membranes, normal pharynx with no erythema or exudates  NECK: Supple; non tender. Full ROM.   CARD: RRR, S1/S2. radial pulses 2+ bl  RESP: bilateral crackles, tachypneic on NRB 15L, speaking in scattered sentences.  ABD: soft, non-tender, non-distended, no rebound or guarding. +old circular ulcerous wound on center of epigastrium, chronic per pt  EXT: +bl 1+ pitting edema of LEs to level of knees  NEURO: awake, alert, oriented. +chronic LLE weakness from past CVA. sensation intact throughout.   PSYCH: Cooperative, appropriate.

## 2022-02-06 NOTE — H&P ADULT - TIME-BASED BILLING (NON-CRITICAL CARE)
Writer attempted to call Jacinta daughter for update. Phone number not working at this time.    Time-based billing (NON-critical care)

## 2022-02-06 NOTE — ED PROVIDER NOTE - NS ED ROS FT
Constitutional: (-) diaphoresis  Eyes/ENT: (-) runny nose  Cardiovascular: (-) chest pain  Respiratory: see HPI  Gastrointestinal: (-) vomiting, (-) diarrhea  : (-) dysuria   Musculoskeletal: see HPI  Integumentary: (-) rash  Neurological: (-) LOC  Allergic/Immunologic: (-) pruritus

## 2022-02-06 NOTE — PATIENT PROFILE ADULT - PRO INTERPRETER NEED 2
Assumed care of pt. Brother and sister here with pt. Pt needs assist to get into bed. Needs simple specific instructions and will follow commands.    When asked about pain pt having expressive aphasia mixing up words, but eventually able to get out that English

## 2022-02-06 NOTE — PATIENT PROFILE ADULT - FALL HARM RISK - HARM RISK INTERVENTIONS

## 2022-02-06 NOTE — ED ADULT NURSE NOTE - ED STAT RN HANDOFF DETAILS
Attempted to give report as per , unaware wear patient is going due to bed board to call back charge nurse made aware.

## 2022-02-06 NOTE — CONSULT NOTE ADULT - SUBJECTIVE AND OBJECTIVE BOX
GENERAL SURGERY CONSULT NOTE    Patient: WILLIAN MARY , 56y ([birth year])Female   MRN: [id number]  Location: [facility] ER Hold 007 A  Visit: 02-06-22 Inpatient  Date: 02-06-22 @ 16:42    HPI: This is a 55yo female with PMH of COPD (on 4L of home O2), CORNELIUS on CPAP, HFrEF (15-20% on TTE Nov 2021), severe pulmonary HTN, CAD s/p PCI to RCA, HTN, HLD, CVA with residual LLE weakness, DM, recently quit smoking who was BIBA for dyspnea x 2 days. Per EMS, patient was put on NRB @ 10L and received albuterol x 1 with improvement en route.      In the ED patient was tachycardic to 121, elevated troponin to 0.02, and BNP of 4892. CTA revealed cardiomegaly with asymmetric right heart dilation and mild/moderate pericardial effusion. In the ED patient received 40mg of Lasix. Patient is wheelchair bound at baseline. Patient was recently admitted in Jan 2021 for bilateral lower extremity edema and was COVID positive.     Surgery was consulted for pericardial effusion. Patient also admits to shortness of breath and bilateral lower extremity edema. At time of evaluation patient states her dyspnea and SOB have improved. Patient admits to forgetting to wear her Lifevest.     PAST MEDICAL & SURGICAL HISTORY:  Hypertension    Hyperlipidemia    Anxiety and depression    COPD, severe    CHF (congestive heart failure)    Cerebrovascular accident (CVA)  Multiple    Type 2 diabetes mellitus    CKD (chronic kidney disease), stage II    No significant past surgical history    Home Medications:  magnesium oxide 400 mg oral tablet: 1 tab(s) orally 3 times a day (with meals) (14 Jan 2022 12:37)  pantoprazole 40 mg oral delayed release tablet: 1 tab(s) orally once a day (before a meal) (16 Nov 2021 12:10)    VITALS:  T(F): 99.8 (02-06-22 @ 10:46), Max: 99.8 (02-06-22 @ 10:46)  HR: 117 (02-06-22 @ 16:00) (114 - 121)  BP: 116/72 (02-06-22 @ 16:00) (113/84 - 142/89)  RR: 20 (02-06-22 @ 16:00) (20 - 22)  SpO2: 98% (02-06-22 @ 16:00) (96% - 100%)    PHYSICAL EXAM:  General: AAOx3, calm and cooperative  HEENT: NCAT, ERIKA, EOMI  Cardiac: RRR S1, S2   Respiratory: +labored breathing, breath sounds equal BL, no wheeze, rhonchi or crackles  Abdomen: Soft, non-distended, non-tender, no rebound, no guarding.    Extremities: 2+ pitting edema bilaterally   Skin: Warm/dry, normal color, no jaundice      LAB/STUDIES:                        13.8   6.65  )-----------( 269      ( 06 Feb 2022 10:50 )             44.7     02-06    137  |  96<L>  |  16  ----------------------------<  164<H>  3.9   |  20  |  1.0    Ca    9.1      06 Feb 2022 10:50  Mg     1.5     02-06    TPro  7.2  /  Alb  3.9  /  TBili  2.4<H>  /  DBili  x   /  AST  18  /  ALT  9   /  AlkPhos  102  02-06    LIVER FUNCTIONS - ( 06 Feb 2022 10:50 )  Alb: 3.9 g/dL / Pro: 7.2 g/dL / ALK PHOS: 102 U/L / ALT: 9 U/L / AST: 18 U/L / GGT: x           CARDIAC MARKERS ( 06 Feb 2022 10:50 )  x     / 0.02 ng/mL / x     / x     / x        IMAGING:  < from: CT Angio Chest PE Protocol w/ IV Cont (02.06.22 @ 14:46) >  1.  No pulmonary embolus.    2.  Cardiomegaly with asymmetric right heart dilatation. Mild/moderate   pericardial effusion, slightly increased since prior. Consider   correlation with echocardiography as clinically warranted.    3.  Small bilateral pleural effusions, slightly increased since prior.   Trace perihepatic ascites, partially imaged.    < from: Xray Chest 1 View-PORTABLE IMMEDIATE (02.06.22 @ 12:03) >  1. Mild pulmonary vascular congestion and interstitial edema.  2. Stable cardiomegaly.                   GENERAL SURGERY CONSULT NOTE    Patient: WILLIAN MARY , 56y ([birth year])Female   MRN: [id number]  Location: [facility] ER Hold 007 A  Visit: 02-06-22 Inpatient  Date: 02-06-22 @ 16:42    HPI:   This is a 57yo female with PMH of COPD (on 4L of home O2), CORNELIUS on CPAP, HFrEF (15-20% on TTE Nov 2021), severe pulmonary HTN, CAD s/p PCI to RCA, HTN, HLD, CVA with residual LLE weakness, DM, recently quit smoking who was BIBA for dyspnea x 2 days. Per EMS, patient was put on NRB @ 10L and received albuterol x 1 with improvement en route. In the ED patient was tachycardic to 121, elevated troponin to 0.02, and BNP of 4892. CTA revealed cardiomegaly with asymmetric right heart dilation and mild/moderate pericardial effusion. In the ED patient received 40mg of Lasix. Patient is wheelchair bound at baseline. Patient was recently admitted in Jan 2021 for bilateral lower extremity edema and was COVID positive.     Surgery was consulted for pericardial effusion. Patient also admits to shortness of breath and bilateral lower extremity edema. At time of evaluation patient states her dyspnea and SOB have improved. Patient admits to forgetting to wear her Lifevest.     PAST MEDICAL & SURGICAL HISTORY:  Hypertension  Hyperlipidemia  Anxiety and depression  COPD, severe  CHF (congestive heart failure)  Cerebrovascular accident (CVA) Multiple  Type 2 diabetes mellitus  CKD (chronic kidney disease), stage II  No significant past surgical history    Home Medications:  magnesium oxide 400 mg oral tablet: 1 tab(s) orally 3 times a day (with meals) (14 Jan 2022 12:37)  pantoprazole 40 mg oral delayed release tablet: 1 tab(s) orally once a day (before a meal) (16 Nov 2021 12:10)    VITALS:  T(F): 99.8 (02-06-22 @ 10:46), Max: 99.8 (02-06-22 @ 10:46)  HR: 117 (02-06-22 @ 16:00) (114 - 121)  BP: 116/72 (02-06-22 @ 16:00) (113/84 - 142/89)  RR: 20 (02-06-22 @ 16:00) (20 - 22)  SpO2: 98% (02-06-22 @ 16:00) (96% - 100%)    PHYSICAL EXAM:  General: AAOx3, calm and cooperative  HEENT: NCAT, ERIKA, EOMI, no JVD  Cardiac: RRR S1, S2   Respiratory: +labored breathing, breath sounds equal BL, no wheeze, rhonchi or crackles  Abdomen: Soft, non-distended, non-tender, no rebound, no guarding.    Extremities: 2+ pitting edema bilaterally   Skin: Warm/dry, normal color, no jaundice    LAB/STUDIES:                        13.8   6.65  )-----------( 269      ( 06 Feb 2022 10:50 )             44.7     02-06    137  |  96<L>  |  16  ----------------------------<  164<H>  3.9   |  20  |  1.0    Ca    9.1      06 Feb 2022 10:50  Mg     1.5     02-06    TPro  7.2  /  Alb  3.9  /  TBili  2.4<H>  /  DBili  x   /  AST  18  /  ALT  9   /  AlkPhos  102  02-06    LIVER FUNCTIONS - ( 06 Feb 2022 10:50 )  Alb: 3.9 g/dL / Pro: 7.2 g/dL / ALK PHOS: 102 U/L / ALT: 9 U/L / AST: 18 U/L / GGT: x           CARDIAC MARKERS ( 06 Feb 2022 10:50 )  x     / 0.02 ng/mL / x     / x     / x        IMAGING:  < from: CT Angio Chest PE Protocol w/ IV Cont (02.06.22 @ 14:46) >  1.  No pulmonary embolus.  2.  Cardiomegaly with asymmetric right heart dilatation. Mild/moderate pericardial effusion, slightly increased since prior. Consider correlation with echocardiography as clinically warranted.  3.  Small bilateral pleural effusions, slightly increased since prior. Trace perihepatic ascites, partially imaged.      < from: Xray Chest 1 View-PORTABLE IMMEDIATE (02.06.22 @ 12:03) >  1. Mild pulmonary vascular congestion and interstitial edema.  2. Stable cardiomegaly.

## 2022-02-06 NOTE — CONSULT NOTE ADULT - ASSESSMENT
ASSESSMENT:  57yo female with PMH of COPD (on 4L of home O2), CORNELIUS on CPAP, HFrEF (15-20% on TTE Nov 2021), severe pulmonary HTN, CAD s/p PCI to RCA, HTN, HLD, CVA with residual LLE weakness, DM, recently quit smoking who was BIBA for dyspnea x 2 days. Per EMS, patient was put on NRB @ 10L and received albuterol x 1 with improvement en route.  In the ED patient was tachycardic to 121, elevated troponin to 0.02, and BNP of 4892. CTA revealed cardiomegaly with asymmetric right heart dilation and mild/moderate pericardial effusion. In the ED patient received 40mg of Lasix. Physical exam findings, imaging, and labs as documented above.     PLAN:  -obtain TTE  -  -    Above plan discussed with Attending Surgeon Dr. Snider, patient, and ED team 02-06-22 @ 16:42 ASSESSMENT:  57yo female with PMH of COPD (on 4L of home O2), CORNELIUS on CPAP, HFrEF (15-20% on TTE Nov 2021), severe pulmonary HTN, CAD s/p PCI to RCA, HTN, HLD, CVA with residual LLE weakness, DM, recently quit smoking who was BIBA for dyspnea x 2 days. Per EMS, patient was put on NRB @ 10L and received albuterol x 1 with improvement en route.  In the ED patient was tachycardic to 121, elevated troponin to 0.02, and BNP of 4892. CTA revealed cardiomegaly with asymmetric right heart dilation and mild/moderate pericardial effusion. In the ED patient received 40mg of Lasix. Physical exam findings, imaging, and labs as documented above.     PLAN:  - will FU formal echo  - please keep NPO after midnight  - to be discussed on CT rounds     Above plan discussed with Attending Surgeon Dr. Snider, patient, and ED team 02-06-22 @ 16:42 ASSESSMENT:  57yo female with PMH of COPD (on 4L of home O2), CORNELIUS on CPAP, HFrEF (15-20% on TTE Nov 2021), severe pulmonary HTN, CAD s/p PCI to RCA, HTN, HLD, CVA with residual LLE weakness, DM, recently quit smoking who was BIBA for dyspnea x 2 days. Per EMS, patient was put on NRB @ 10L and received albuterol x 1 with improvement en route.  In the ED patient was tachycardic to 121, elevated troponin to 0.02, and BNP of 4892. CTA revealed cardiomegaly with asymmetric right heart dilation and mild/moderate pericardial effusion. In the ED patient received 40mg of Lasix. Physical exam findings, imaging, and labs as documented above.     PLAN:  - will FU formal echo  - please keep NPO after midnight  - to be discussed on CT rounds     Above plan discussed with Attending Surgeon Dr. Snider, patient, and ED team 02-06-22 @ 16:42    CTS Attending  CT scan and echo reviewed  mild pericardial effusion requires no drainage at this time  More important is the underlying condition leading to the extraordinary amount of tricuspid insufficiency and contrast reflux into the hepatic veins.    -FMR

## 2022-02-06 NOTE — H&P ADULT - ATTENDING COMMENTS
56/F with COPD (on 3L home O2), CORNELIUS on CPAP, HFrEF (15-20% on TTE 11/2021), severe pulmonary HTN, CAD s/p PCI to RCA, HTN/HLD, CVA with residual LLE weakness, T2DM, recently quit smoking (1 month ago) presented for shortness of breath that worsened last night.     # Acute on chronic hypoxic-hypercarbic respiratory failure - multifactorial   - patient with HFrEF, COPD on 3L home O2 and severe pul-HTN  - worsening pericardial effusion  - CT Angio Chest PE Protocol w/ IV Cont: No PE. Cardiomegaly with asymmetric right heart dilatation. Mild/moderate pericardial effusion, slightly increased since prior. Small bilateral pleural effusions, slightly increased since prior.   - TTE 11/14/2021: EF 15-20%, G2DD, mod MR, mild-mod TR, severe P-HTN, small pericardial effusion. Follow repeat Echo  - s/p 40mg IV lasix in the ED. Continue 40mg IV lasix for now  - Troponin 0.02 - trend   - CT surgery consulted for increase in pericardial effusion - final recs to follow     # CAD s/p PCI to RCA - Continue ASA 81mg PO QD, Plavix 75mg PO QD, Metoprolol 50mg PO QD    # CORNELIUS on CPAP - CPAP at night     # Hx of CVA with residual weakness- continue aspirin and statin      # HTN/ mixed HLD  - Continue atorvastatin, ASA, Plavix, fenofibrate     # Diabetes Mellitus type II   - Last HbA1C 7.7 %   - Will hold home medications  - sliding scale - adjust insulin to keep FS btw 140-180    # Need for Prophylaxis measures   - Lovenox subq

## 2022-02-06 NOTE — ED PROVIDER NOTE - PROGRESS NOTE4
Han Ortiz from [facility] called with an [place] on patient. Patient has missed several appointments for both home health and PT. Nurse and physical therapist call before coming and she will have them come out and then not open the door for them. They stated that patient has reported to them that she has fallen several times and they have noticed no bruising or marks on her to indicate falls. Patient has not gone to ER like stated that she would be to be checked out. Home health feels that she is attention seeking and new home environment  is not the best. Stated that they will have to discharge if she continues to miss appointments. Improved.

## 2022-02-06 NOTE — H&P ADULT - HISTORY OF PRESENT ILLNESS
55 YO F w/ PMHx of COPD (on 3L home O2), CORNELIUS on CPAP, HFrEF (15-20% on TTE 11/2021), severe pulmonary HTN, CAD s/p PCI to RCA, HTN/HLD, CVA with residual LLE weakness, T2DM, recently quit smoking presented for shortness of breath that worsened last night.   Per EMS, patient was placed on NRB 10L and one albuterol treatment with improvement en route. Pt was recently admitted in Jan 2021 for b/l lower extremity edema and was found to be COVID-19 positive at the time. Reports being vaccinated against COVID.   Denies fever/chills. Reports chronic shortness of breath and cough productive for phlegm. No chest pain, palpitations, abd pain, n/v/d, numbness, tingling, focal weakness, headache, neck pain.   Pt wheelchair bound at baseline. Reports chronic b/l leg swelling from HF, not worsened than usual  In the ED VS were sig for a HR of 121 bpm. She was placed on BiPAP and then transitioned to NC  CT angio showed mild-mod pericardial effusion. ED spoke with CT surgery and they will evaluate  55 YO F w/ PMHx of COPD (on 3L home O2), CORNELIUS on CPAP, HFrEF (15-20% on TTE 11/2021), severe pulmonary HTN, CAD s/p PCI to RCA, HTN/HLD, CVA with residual LLE weakness, T2DM, recently quit smoking (1 month ago) presented for shortness of breath that worsened last night.   As per the patient she was noticing worsening LE swelling and increased SOB for the last few days. Last last she had chinese food and then her SOB worsened. Per EMS, patient was placed on NRB 10L and one albuterol treatment with improvement en route. Pt was recently admitted in Jan 2021 for b/l lower extremity edema and was found to be COVID-19 positive at the time. Reports being vaccinated against COVID-19.   Denies fever/chills. No chest pain, palpitations, abd pain, N/V/D. Has cough with sputum at baseline, denies any change in cough or sputum  Pt wheelchair bound at baseline.  In the ED VS were sig for a HR of 121 bpm. She was placed on BiPAP and then transitioned to NC  CT angio showed mild-mod pericardial effusion. ED spoke with CT surgery and they will evaluate

## 2022-02-06 NOTE — ED PROCEDURE NOTE - PROCEDURE SUPERVISED BY
PATIENT IS REQUESTING  CARAFATE   SENT TO [facility] IN [place]   CALL CELL  SHE AND HER SPOUSE ARE ASKING WHEN WE ARE DOING COVID TESTING.  THEY TRAVELED IN JAN TO [place] BOTH HAD HIGH FEVER SINCE THE TRIP. THEY WERE  IN SEVERAL AIRPORTS.      
Radha
Radha
Rdaha

## 2022-02-06 NOTE — ED PROCEDURE NOTE - PROCEDURE NAME, MLM
Point of Care Ultrasound Cardiac
Point of Care Ultrasound Vascular Access
Point of Care Ultrasound Lung

## 2022-02-06 NOTE — ED ADULT NURSE NOTE - OBJECTIVE STATEMENT
Pt presented to ED c/o SOB. Pt c/o SOB, h/o COPD, on 2LNC. Denies n/v/d/fevers/chills. Pt A&Ox4, ambulatory w/ wheelchair at home. Pt on Tele/O2 monitor. BA in place, fall precautions initiated.

## 2022-02-06 NOTE — ED PROCEDURE NOTE - ATTENDING CONTRIBUTION TO CARE
I personally supervised the study.  I reviewed the images and interpretation by the resident/ACP and have edited where appropriate.

## 2022-02-07 LAB
A1C WITH ESTIMATED AVERAGE GLUCOSE RESULT: 6.8 % — HIGH (ref 4–5.6)
ALBUMIN SERPL ELPH-MCNC: 3.7 G/DL — SIGNIFICANT CHANGE UP (ref 3.5–5.2)
ALP SERPL-CCNC: 100 U/L — SIGNIFICANT CHANGE UP (ref 30–115)
ALT FLD-CCNC: 8 U/L — SIGNIFICANT CHANGE UP (ref 0–41)
ANION GAP SERPL CALC-SCNC: 16 MMOL/L — HIGH (ref 7–14)
AST SERPL-CCNC: 15 U/L — SIGNIFICANT CHANGE UP (ref 0–41)
BASOPHILS # BLD AUTO: 0.17 K/UL — SIGNIFICANT CHANGE UP (ref 0–0.2)
BASOPHILS NFR BLD AUTO: 3 % — HIGH (ref 0–1)
BILIRUB SERPL-MCNC: 2.3 MG/DL — HIGH (ref 0.2–1.2)
BUN SERPL-MCNC: 17 MG/DL — SIGNIFICANT CHANGE UP (ref 10–20)
CALCIUM SERPL-MCNC: 9 MG/DL — SIGNIFICANT CHANGE UP (ref 8.5–10.1)
CHLORIDE SERPL-SCNC: 99 MMOL/L — SIGNIFICANT CHANGE UP (ref 98–110)
CO2 SERPL-SCNC: 23 MMOL/L — SIGNIFICANT CHANGE UP (ref 17–32)
CREAT SERPL-MCNC: 0.9 MG/DL — SIGNIFICANT CHANGE UP (ref 0.7–1.5)
EOSINOPHIL # BLD AUTO: 0.15 K/UL — SIGNIFICANT CHANGE UP (ref 0–0.7)
EOSINOPHIL NFR BLD AUTO: 2.6 % — SIGNIFICANT CHANGE UP (ref 0–8)
ESTIMATED AVERAGE GLUCOSE: 148 MG/DL — HIGH (ref 68–114)
GLUCOSE BLDC GLUCOMTR-MCNC: 122 MG/DL — HIGH (ref 70–99)
GLUCOSE BLDC GLUCOMTR-MCNC: 129 MG/DL — HIGH (ref 70–99)
GLUCOSE BLDC GLUCOMTR-MCNC: 129 MG/DL — HIGH (ref 70–99)
GLUCOSE BLDC GLUCOMTR-MCNC: 212 MG/DL — HIGH (ref 70–99)
GLUCOSE SERPL-MCNC: 134 MG/DL — HIGH (ref 70–99)
HCT VFR BLD CALC: 44 % — SIGNIFICANT CHANGE UP (ref 37–47)
HGB BLD-MCNC: 14 G/DL — SIGNIFICANT CHANGE UP (ref 12–16)
IMM GRANULOCYTES NFR BLD AUTO: 0.5 % — HIGH (ref 0.1–0.3)
LYMPHOCYTES # BLD AUTO: 1.71 K/UL — SIGNIFICANT CHANGE UP (ref 1.2–3.4)
LYMPHOCYTES # BLD AUTO: 29.9 % — SIGNIFICANT CHANGE UP (ref 20.5–51.1)
MAGNESIUM SERPL-MCNC: 1.8 MG/DL — SIGNIFICANT CHANGE UP (ref 1.8–2.4)
MCHC RBC-ENTMCNC: 28.7 PG — SIGNIFICANT CHANGE UP (ref 27–31)
MCHC RBC-ENTMCNC: 31.8 G/DL — LOW (ref 32–37)
MCV RBC AUTO: 90.2 FL — SIGNIFICANT CHANGE UP (ref 81–99)
MONOCYTES # BLD AUTO: 0.4 K/UL — SIGNIFICANT CHANGE UP (ref 0.1–0.6)
MONOCYTES NFR BLD AUTO: 7 % — SIGNIFICANT CHANGE UP (ref 1.7–9.3)
NEUTROPHILS # BLD AUTO: 3.26 K/UL — SIGNIFICANT CHANGE UP (ref 1.4–6.5)
NEUTROPHILS NFR BLD AUTO: 57 % — SIGNIFICANT CHANGE UP (ref 42.2–75.2)
NRBC # BLD: 0 /100 WBCS — SIGNIFICANT CHANGE UP (ref 0–0)
PHOSPHATE SERPL-MCNC: 3.3 MG/DL — SIGNIFICANT CHANGE UP (ref 2.1–4.9)
PLATELET # BLD AUTO: 271 K/UL — SIGNIFICANT CHANGE UP (ref 130–400)
POTASSIUM SERPL-MCNC: 3.8 MMOL/L — SIGNIFICANT CHANGE UP (ref 3.5–5)
POTASSIUM SERPL-SCNC: 3.8 MMOL/L — SIGNIFICANT CHANGE UP (ref 3.5–5)
PROT SERPL-MCNC: 7 G/DL — SIGNIFICANT CHANGE UP (ref 6–8)
RBC # BLD: 4.88 M/UL — SIGNIFICANT CHANGE UP (ref 4.2–5.4)
RBC # FLD: 22.3 % — HIGH (ref 11.5–14.5)
SODIUM SERPL-SCNC: 138 MMOL/L — SIGNIFICANT CHANGE UP (ref 135–146)
TROPONIN T SERPL-MCNC: 0.02 NG/ML — HIGH
WBC # BLD: 5.72 K/UL — SIGNIFICANT CHANGE UP (ref 4.8–10.8)
WBC # FLD AUTO: 5.72 K/UL — SIGNIFICANT CHANGE UP (ref 4.8–10.8)

## 2022-02-07 PROCEDURE — 99233 SBSQ HOSP IP/OBS HIGH 50: CPT

## 2022-02-07 RX ORDER — FUROSEMIDE 40 MG
40 TABLET ORAL
Refills: 0 | Status: DISCONTINUED | OUTPATIENT
Start: 2022-02-07 | End: 2022-02-08

## 2022-02-07 RX ADMIN — ENOXAPARIN SODIUM 40 MILLIGRAM(S): 100 INJECTION SUBCUTANEOUS at 12:19

## 2022-02-07 RX ADMIN — SACUBITRIL AND VALSARTAN 1 TABLET(S): 24; 26 TABLET, FILM COATED ORAL at 05:18

## 2022-02-07 RX ADMIN — PANTOPRAZOLE SODIUM 40 MILLIGRAM(S): 20 TABLET, DELAYED RELEASE ORAL at 05:18

## 2022-02-07 RX ADMIN — Medication 2: at 12:23

## 2022-02-07 RX ADMIN — Medication 145 MILLIGRAM(S): at 14:36

## 2022-02-07 RX ADMIN — Medication 40 MILLIGRAM(S): at 05:18

## 2022-02-07 RX ADMIN — MAGNESIUM OXIDE 400 MG ORAL TABLET 400 MILLIGRAM(S): 241.3 TABLET ORAL at 08:00

## 2022-02-07 RX ADMIN — ATORVASTATIN CALCIUM 80 MILLIGRAM(S): 80 TABLET, FILM COATED ORAL at 22:08

## 2022-02-07 RX ADMIN — Medication 40 MILLIGRAM(S): at 17:51

## 2022-02-07 RX ADMIN — Medication 81 MILLIGRAM(S): at 12:18

## 2022-02-07 RX ADMIN — QUETIAPINE FUMARATE 25 MILLIGRAM(S): 200 TABLET, FILM COATED ORAL at 22:08

## 2022-02-07 RX ADMIN — MAGNESIUM OXIDE 400 MG ORAL TABLET 400 MILLIGRAM(S): 241.3 TABLET ORAL at 12:19

## 2022-02-07 RX ADMIN — CLOPIDOGREL BISULFATE 75 MILLIGRAM(S): 75 TABLET, FILM COATED ORAL at 12:18

## 2022-02-07 RX ADMIN — SPIRONOLACTONE 25 MILLIGRAM(S): 25 TABLET, FILM COATED ORAL at 05:18

## 2022-02-07 RX ADMIN — Medication 50 MILLIGRAM(S): at 05:18

## 2022-02-07 RX ADMIN — BUDESONIDE AND FORMOTEROL FUMARATE DIHYDRATE 2 PUFF(S): 160; 4.5 AEROSOL RESPIRATORY (INHALATION) at 10:00

## 2022-02-07 NOTE — PROGRESS NOTE ADULT - ATTENDING COMMENTS
Pt seen and examined. Admitted for ORR due to acute on chronic HFrEF (EF 19% on echo 02/06). Recently had C 06/2021 with RICARDO to mRCA by Dr. Murphy. Echo 02/06 showing small pericardial effusion, no indication warranted per surgery team. Will continue IV diuresis with IV lasix 40 BID, fluid restrict, and monitor I/Os. She was given lifevest last admission in Jan 2022, with plans for ICD if EF doesn't improve after 3mo of GDMT. Will consult HF team.    #Progress Note Handoff  Pending (specify): IV diuresis  Family discussion: d/w pt regarding continuing IV diuresis  Disposition: Home

## 2022-02-07 NOTE — PROGRESS NOTE ADULT - ASSESSMENT
55 YO F w/ PMHx of COPD (on 3L home O2), CORNELIUS on CPAP, HFrEF (15-20% on TTE 11/2021), severe pulmonary HTN, CAD s/p PCI to RCA, HTN/HLD, CVA with residual LLE weakness, T2DM, recently quit smoking (1 month ago) presented for shortness of breath that worsened last night.     #Dyspnea; multifactorial   #Hx of HFrEF; BNP around baseline   #Hx of COPD on 3L home O2  #Hx of severe pul-HTN  #Mild-mod pericardial effusion; low probability of tamponade    - CT Angio Chest PE Protocol w/ IV Cont: No PE. Cardiomegaly with asymmetric right heart dilatation. Mild/moderate pericardial effusion, slightly increased since prior. Small bilateral pleural effusions, slightly increased since prior.   - TTE 11/14/2021: EF 15-20%, G2DD, mod MR, mild-mod TR, severe P-HTN, small pericardial effusion. Follow repeat Echo  - s/p 40mg IV lasix in the ED. Continue 40mg IV lasix for now  - Troponin 0.02; around baseline. Follow repeat   - Daily weight. Strict I & Os. Keep I < O. Fluid restriction  - Continue Metoprolol 50mg PO QD, Entresto 49-51 BID and spironolactone 25mg PO QD  - Will keep NPO after midnight in case of any intervention by surgery. Please d/c NPO order if no intervention planned by surgery team    #CAD s/p PCI to RCA - Continue ASA 81mg PO QD, Plavix 75mg PO QD, Metoprolol 50mg PO QD    #CORNELIUS on CPAP - CPAP at night     #Hx of CVA with residual weakness   #HTN/DLD  - Continue atorvastatin 80mg PO QD, ASA 81mg PO QD, Plavix 75mg PO QD, fenofibrate 145mg PO QD    #Diabetes Mellitus type II   - Last HbA1C 7.7 % (11-10-21)  - Will hold home medications  - Monitor FS. Start insulin if FS > 180. Keep between 140 - 180. Carb consistent diet    #Misc  - DVT Prophylaxis: Lovenox  - Diet: DASH/TLC, CC, fluid restriction  - GI Prophylaxis: pantoprazole   - Activity: AAT  - Code Status: Full Code    Dispo: Admit to tele     55 YO F w/ PMHx of COPD (on 3L home O2), CORNELIUS on CPAP, HFrEF (15-20% on TTE 11/2021), severe pulmonary HTN, CAD s/p PCI to RCA, HTN/HLD, CVA with residual LLE weakness, T2DM, recently quit smoking (1 month ago) presented for shortness of breath that worsened last night.     #Dyspnea; multifactorial   #Hx of HFrEF; BNP around baseline   #Hx of COPD on 3L home O2  #Hx of severe pul-HTN  #Mild-mod pericardial effusion; low probability of tamponade    - CT Angio Chest PE Protocol w/ IV Cont: No PE. Cardiomegaly with asymmetric right heart dilatation. Mild/moderate pericardial effusion, slightly increased since prior. Small bilateral pleural effusions, slightly increased since prior.   - TTE 11/14/2021: EF 15-20%, G2DD, mod MR, mild-mod TR, severe P-HTN, small pericardial effusion. Follow repeat Echo  - s/p 40mg IV lasix in the ED. Increase lasix 40mg qD to BID  - Echo 02/06: 19% EF, small pericardial effusion, moderate p-HTN, mod-severe MR  - Troponin 0.02; around baseline. Follow repeat   - Daily weight. Strict I & Os. Keep I < O. Fluid restriction  - Continue Metoprolol 50mg PO QD, Entresto 49-51 BID and spironolactone 25mg PO QD  - No intervention per sx team, resume diet    #CAD s/p PCI to RCA - Continue ASA 81mg PO QD, Plavix 75mg PO QD, Metoprolol 50mg PO QD    #CORNELIUS on CPAP - CPAP at night     #Hx of CVA with residual weakness   #HTN/DLD  - Continue atorvastatin 80mg PO QD, ASA 81mg PO QD, Plavix 75mg PO QD, fenofibrate 145mg PO QD    #Diabetes Mellitus type II   - Last HbA1C 7.7 % (11-10-21)  - Will hold home medications  - Monitor FS. Start insulin if FS > 180. Keep between 140 - 180. Carb consistent diet    #Misc  - DVT Prophylaxis: Lovenox  - Diet: DASH/TLC, CC, fluid restriction  - GI Prophylaxis: pantoprazole   - Activity: AAT  - Code Status: Full Code    Dispo: Admit to tele

## 2022-02-07 NOTE — PROGRESS NOTE ADULT - SUBJECTIVE AND OBJECTIVE BOX
SUBJECTIVE:    Patient is a 56y old Female who presents with a chief complaint of Shortness of breath (06 Feb 2022 16:39)    Currently admitted to medicine with the primary diagnosis of Pericardial effusion       Today is hospital day 1d. This morning she is resting comfortably in bed and reports no new issues or overnight events.     PAST MEDICAL & SURGICAL HISTORY  Hypertension    Hyperlipidemia    Anxiety and depression    COPD, severe    CHF (congestive heart failure)    Cerebrovascular accident (CVA)  Multiple    Type 2 diabetes mellitus    CKD (chronic kidney disease), stage II    No significant past surgical history      SOCIAL HISTORY:  Negative for smoking/alcohol/drug use.     ALLERGIES:  No Known Allergies    MEDICATIONS:  STANDING MEDICATIONS  aspirin enteric coated 81 milliGRAM(s) Oral daily  atorvastatin 80 milliGRAM(s) Oral at bedtime  budesonide 160 MICROgram(s)/formoterol 4.5 MICROgram(s) Inhaler 2 Puff(s) Inhalation two times a day  clopidogrel Tablet 75 milliGRAM(s) Oral daily  enoxaparin Injectable 40 milliGRAM(s) SubCutaneous daily  fenofibrate Tablet 145 milliGRAM(s) Oral daily  furosemide   Injectable 40 milliGRAM(s) IV Push daily  influenza   Vaccine 0.5 milliLiter(s) IntraMuscular once  insulin lispro (ADMELOG) corrective regimen sliding scale   SubCutaneous Before meals and at bedtime  magnesium oxide 400 milliGRAM(s) Oral three times a day with meals  metoprolol succinate ER 50 milliGRAM(s) Oral daily  pantoprazole    Tablet 40 milliGRAM(s) Oral before breakfast  QUEtiapine 25 milliGRAM(s) Oral at bedtime  sacubitril 49 mG/valsartan 51 mG 1 Tablet(s) Oral two times a day  spironolactone 25 milliGRAM(s) Oral daily    PRN MEDICATIONS  ALBUTerol    90 MICROgram(s) HFA Inhaler 2 Puff(s) Inhalation every 6 hours PRN    VITALS:   T(F): 98.1  HR: 100  BP: 125/78  RR: 20  SpO2: 97%    LABS:                        14.0   5.72  )-----------( 271      ( 07 Feb 2022 06:32 )             44.0     02-07    138  |  99  |  17  ----------------------------<  134<H>  3.8   |  23  |  0.9    Ca    9.0      07 Feb 2022 06:32  Phos  3.3     02-07  Mg     1.8     02-07    TPro  7.0  /  Alb  3.7  /  TBili  2.3<H>  /  DBili  x   /  AST  15  /  ALT  8   /  AlkPhos  100  02-07          Troponin T, Serum: 0.02 ng/mL *H* (02-07-22 @ 06:32)  Troponin T, Serum: 0.02 ng/mL *H* (02-06-22 @ 10:50)      CARDIAC MARKERS ( 07 Feb 2022 06:32 )  x     / 0.02 ng/mL / x     / x     / x      CARDIAC MARKERS ( 06 Feb 2022 10:50 )  x     / 0.02 ng/mL / x     / x     / x          RADIOLOGY:    PHYSICAL EXAM:  GEN: No acute distress  LUNGS: Clear to auscultation bilaterally   HEART: Regular  ABD: Soft, non-tender, non-distended.  EXT: NC/NC/NE/2+PP/ROLDAN/Skin Intact.   NEURO: AAOX3    Intravenous access:   NG tube:   Wilkerson Catheter:

## 2022-02-07 NOTE — PROGRESS NOTE ADULT - ASSESSMENT
55yo female with PMH of COPD (on 4L of home O2), CORNELIUS on CPAP, HFrEF (15-20% on TTE Nov 2021), severe pulmonary HTN, CAD s/p PCI to RCA, HTN, HLD, CVA with residual LLE weakness, DM, recently quit smoking who was BIBA for dyspnea x 2 days. Per EMS, patient was put on NRB @ 10L and received albuterol x 1 with improvement en route.  In the ED patient was tachycardic to 121, elevated troponin to 0.02, and BNP of 4892. CTA revealed cardiomegaly with asymmetric right heart dilation and mild/moderate pericardial effusion. In the ED patient received 40mg of Lasix. Physical exam findings, imaging, and labs as documented above.     PLAN:  - ECHO: Small pericardial effusion adjacent to the right atrium.  - no acute surgical intervention at this moment  - CT surgery to follow    spectra [id number]

## 2022-02-07 NOTE — PROGRESS NOTE ADULT - SUBJECTIVE AND OBJECTIVE BOX
GENERAL SURGERY PROGRESS NOTE    Patient: WILLIAN MARY , 56y ([birth year])Female   MRN: [id number]  [facility] [place] [address] [facility] [address] [facility]  Visit: 02-06-22 Inpatient  Date: 02-07-22 @ 08:49      Procedure/Dx/Injuries:   Pericardial Effusion    Events of past 24 hours: ECHO performed    PAST MEDICAL & SURGICAL HISTORY:  Hypertension    Hyperlipidemia    Anxiety and depression    COPD, severe    CHF (congestive heart failure)    Cerebrovascular accident (CVA)  Multiple    Type 2 diabetes mellitus    CKD (chronic kidney disease), stage II    No significant past surgical history        Vitals:   T(F): 98.1 (02-07-22 @ 05:00), Max: 99.8 (02-06-22 @ 10:46)  HR: 100 (02-07-22 @ 05:00)  BP: 125/78 (02-07-22 @ 05:00)  RR: 20 (02-07-22 @ 05:01)  SpO2: 97% (02-07-22 @ 05:01)      Diet, NPO after Midnight:      NPO Start Date: 06-Feb-2022,   NPO Start Time: 23:59  Except Medications  Diet, NPO after Midnight:      NPO Start Date: 06-Feb-2022,   NPO Start Time: 23:59  Except Medications  Diet, NPO after Midnight:      NPO Start Date: 06-Feb-2022,   NPO Start Time: 23:59  Diet, DASH/TLC:   Sodium & Cholesterol Restricted  Consistent Carbohydrate Evening Snack  1000mL Fluid Restriction ([id number])      Fluids:     I & O's:    02-06-22 @ 07:01  -  02-07-22 @ 07:00  --------------------------------------------------------  IN:    Oral Fluid: 60 mL  Total IN: 60 mL    OUT:  Total OUT: 0 mL    Total NET: 60 mL          PHYSICAL EXAM:  General: AAOx3, calm and cooperative  Cardiac: RRR S1, S2   Respiratory: +labored breathing, breath sounds equal BL, no wheeze, rhonchi or crackles  Abdomen: Soft, non-distended, non-tender, no rebound, no guarding.    Extremities: 2+ pitting edema bilaterally   Skin: Warm/dry    MEDICATIONS  (STANDING):  aspirin enteric coated 81 milliGRAM(s) Oral daily  atorvastatin 80 milliGRAM(s) Oral at bedtime  budesonide 160 MICROgram(s)/formoterol 4.5 MICROgram(s) Inhaler 2 Puff(s) Inhalation two times a day  clopidogrel Tablet 75 milliGRAM(s) Oral daily  enoxaparin Injectable 40 milliGRAM(s) SubCutaneous daily  fenofibrate Tablet 145 milliGRAM(s) Oral daily  furosemide   Injectable 40 milliGRAM(s) IV Push daily  influenza   Vaccine 0.5 milliLiter(s) IntraMuscular once  insulin lispro (ADMELOG) corrective regimen sliding scale   SubCutaneous Before meals and at bedtime  magnesium oxide 400 milliGRAM(s) Oral three times a day with meals  metoprolol succinate ER 50 milliGRAM(s) Oral daily  pantoprazole    Tablet 40 milliGRAM(s) Oral before breakfast  QUEtiapine 25 milliGRAM(s) Oral at bedtime  sacubitril 49 mG/valsartan 51 mG 1 Tablet(s) Oral two times a day  spironolactone 25 milliGRAM(s) Oral daily    MEDICATIONS  (PRN):  ALBUTerol    90 MICROgram(s) HFA Inhaler 2 Puff(s) Inhalation every 6 hours PRN Bronchospasm      DVT PROPHYLAXIS: enoxaparin Injectable 40 milliGRAM(s) SubCutaneous daily    GI PROPHYLAXIS: pantoprazole    Tablet 40 milliGRAM(s) Oral before breakfast    ANTICOAGULATION:   ANTIBIOTICS:            LAB/STUDIES:  Labs:  CAPILLARY BLOOD GLUCOSE      POCT Blood Glucose.: 122 mg/dL (07 Feb 2022 07:58)  POCT Blood Glucose.: 125 mg/dL (06 Feb 2022 21:39)  POCT Blood Glucose.: 169 mg/dL (06 Feb 2022 16:42)  POCT Blood Glucose.: 159 mg/dL (06 Feb 2022 10:33)                          14.0   5.72  )-----------( 271      ( 07 Feb 2022 06:32 )             44.0       Auto Neutrophil %: 57.0 % (02-07-22 @ 06:32)  Auto Immature Granulocyte %: 0.5 % (02-07-22 @ 06:32)  Auto Neutrophil %: 70.6 % (02-06-22 @ 10:50)  Auto Immature Granulocyte %: 0.5 % (02-06-22 @ 10:50)    02-07    138  |  99  |  17  ----------------------------<  134<H>  3.8   |  23  |  0.9      Calcium, Total Serum: 9.0 mg/dL (02-07-22 @ 06:32)      LFTs:             7.0  | 2.3  | 15       ------------------[100     ( 07 Feb 2022 06:32 )  3.7  | x    | 8           Lipase:x      Amylase:x         Blood Gas Venous - Lactate: 2.60 mmol/L (02-06-22 @ 14:30)  Blood Gas Venous - Lactate: 3.70 mmol/L (02-06-22 @ 10:45)      Coags:    CARDIAC MARKERS ( 07 Feb 2022 06:32 )  x     / 0.02 ng/mL / x     / x     / x      CARDIAC MARKERS ( 06 Feb 2022 10:50 )  x     / 0.02 ng/mL / x     / x     / x          Serum Pro-Brain Natriuretic Peptide: 4892 pg/mL (02-06-22 @ 10:50)                  IMAGING:    < from: TTE Echo Complete w/o Contrast w/ Doppler (02.06.22 @ 17:21) >   1. LV Ejection Fraction by Urena's Method with a biplane EF of 19 %.   2. Severely decreased global left ventricular systolic function.   3. Dilated RV with moderately reduced systolic function.   4. Moderately enlarged left atrium.   5. Severely enlarged right atrium.   6. Sclerotic aorticvalve with normal opening.   7. Mild aortic regurgitation.   8. The mitral valve leaflets are tethered due to reduced systolic   function and elevated LVDP.   9. Moderate-to-severe mitral regurgitation.  10. Severe tricuspid regurgitation.  11. Estimated pulmonary artery systolic pressure is 53.7 mmHg assuming a   right atrial pressure of 15 mmHg, which is consistent with moderate   pulmonary hypertension.  12. Small pericardial effusion adjacent to the right atrium.    < end of copied text >      ACCESS/ DEVICES:  [ ] Peripheral IV  [ ] Central Venous Line	[ ] R	[ ] L	[ ] IJ	[ ] Fem	[ ] SC	Placed:   [ ] Arterial Line		[ ] R	[ ] L	[ ] Fem	[ ] Rad	[ ] Ax	Placed:   [ ] PICC:					[ ] Mediport  [ ] Urinary Catheter,  Date Placed:   [ ] Chest tube: [ ] Right, [ ] Left  [ ] DON/Chris Drains

## 2022-02-08 LAB
ANION GAP SERPL CALC-SCNC: 15 MMOL/L — HIGH (ref 7–14)
ANION GAP SERPL CALC-SCNC: 16 MMOL/L — HIGH (ref 7–14)
BASOPHILS # BLD AUTO: 0.15 K/UL — SIGNIFICANT CHANGE UP (ref 0–0.2)
BASOPHILS NFR BLD AUTO: 2.4 % — HIGH (ref 0–1)
BUN SERPL-MCNC: 20 MG/DL — SIGNIFICANT CHANGE UP (ref 10–20)
BUN SERPL-MCNC: 22 MG/DL — HIGH (ref 10–20)
CALCIUM SERPL-MCNC: 9 MG/DL — SIGNIFICANT CHANGE UP (ref 8.5–10.1)
CALCIUM SERPL-MCNC: 9.1 MG/DL — SIGNIFICANT CHANGE UP (ref 8.5–10.1)
CHLORIDE SERPL-SCNC: 97 MMOL/L — LOW (ref 98–110)
CHLORIDE SERPL-SCNC: 97 MMOL/L — LOW (ref 98–110)
CO2 SERPL-SCNC: 24 MMOL/L — SIGNIFICANT CHANGE UP (ref 17–32)
CO2 SERPL-SCNC: 25 MMOL/L — SIGNIFICANT CHANGE UP (ref 17–32)
CREAT SERPL-MCNC: 1.2 MG/DL — SIGNIFICANT CHANGE UP (ref 0.7–1.5)
CREAT SERPL-MCNC: 1.3 MG/DL — SIGNIFICANT CHANGE UP (ref 0.7–1.5)
EOSINOPHIL # BLD AUTO: 0.13 K/UL — SIGNIFICANT CHANGE UP (ref 0–0.7)
EOSINOPHIL NFR BLD AUTO: 2.1 % — SIGNIFICANT CHANGE UP (ref 0–8)
GLUCOSE BLDC GLUCOMTR-MCNC: 126 MG/DL — HIGH (ref 70–99)
GLUCOSE BLDC GLUCOMTR-MCNC: 134 MG/DL — HIGH (ref 70–99)
GLUCOSE BLDC GLUCOMTR-MCNC: 142 MG/DL — HIGH (ref 70–99)
GLUCOSE BLDC GLUCOMTR-MCNC: 206 MG/DL — HIGH (ref 70–99)
GLUCOSE SERPL-MCNC: 135 MG/DL — HIGH (ref 70–99)
GLUCOSE SERPL-MCNC: 140 MG/DL — HIGH (ref 70–99)
HCT VFR BLD CALC: 44.4 % — SIGNIFICANT CHANGE UP (ref 37–47)
HGB BLD-MCNC: 14.1 G/DL — SIGNIFICANT CHANGE UP (ref 12–16)
IMM GRANULOCYTES NFR BLD AUTO: 0.5 % — HIGH (ref 0.1–0.3)
LYMPHOCYTES # BLD AUTO: 1.85 K/UL — SIGNIFICANT CHANGE UP (ref 1.2–3.4)
LYMPHOCYTES # BLD AUTO: 29.4 % — SIGNIFICANT CHANGE UP (ref 20.5–51.1)
MAGNESIUM SERPL-MCNC: 1.8 MG/DL — SIGNIFICANT CHANGE UP (ref 1.8–2.4)
MCHC RBC-ENTMCNC: 28.5 PG — SIGNIFICANT CHANGE UP (ref 27–31)
MCHC RBC-ENTMCNC: 31.8 G/DL — LOW (ref 32–37)
MCV RBC AUTO: 89.9 FL — SIGNIFICANT CHANGE UP (ref 81–99)
MONOCYTES # BLD AUTO: 0.38 K/UL — SIGNIFICANT CHANGE UP (ref 0.1–0.6)
MONOCYTES NFR BLD AUTO: 6 % — SIGNIFICANT CHANGE UP (ref 1.7–9.3)
NEUTROPHILS # BLD AUTO: 3.76 K/UL — SIGNIFICANT CHANGE UP (ref 1.4–6.5)
NEUTROPHILS NFR BLD AUTO: 59.6 % — SIGNIFICANT CHANGE UP (ref 42.2–75.2)
NRBC # BLD: 0 /100 WBCS — SIGNIFICANT CHANGE UP (ref 0–0)
PLATELET # BLD AUTO: 283 K/UL — SIGNIFICANT CHANGE UP (ref 130–400)
POTASSIUM SERPL-MCNC: 4 MMOL/L — SIGNIFICANT CHANGE UP (ref 3.5–5)
POTASSIUM SERPL-MCNC: 4.1 MMOL/L — SIGNIFICANT CHANGE UP (ref 3.5–5)
POTASSIUM SERPL-SCNC: 4 MMOL/L — SIGNIFICANT CHANGE UP (ref 3.5–5)
POTASSIUM SERPL-SCNC: 4.1 MMOL/L — SIGNIFICANT CHANGE UP (ref 3.5–5)
RBC # BLD: 4.94 M/UL — SIGNIFICANT CHANGE UP (ref 4.2–5.4)
RBC # FLD: 22.2 % — HIGH (ref 11.5–14.5)
SODIUM SERPL-SCNC: 137 MMOL/L — SIGNIFICANT CHANGE UP (ref 135–146)
SODIUM SERPL-SCNC: 137 MMOL/L — SIGNIFICANT CHANGE UP (ref 135–146)
WBC # BLD: 6.3 K/UL — SIGNIFICANT CHANGE UP (ref 4.8–10.8)
WBC # FLD AUTO: 6.3 K/UL — SIGNIFICANT CHANGE UP (ref 4.8–10.8)

## 2022-02-08 PROCEDURE — 99231 SBSQ HOSP IP/OBS SF/LOW 25: CPT

## 2022-02-08 PROCEDURE — 99233 SBSQ HOSP IP/OBS HIGH 50: CPT

## 2022-02-08 PROCEDURE — 99223 1ST HOSP IP/OBS HIGH 75: CPT

## 2022-02-08 RX ORDER — BUMETANIDE 0.25 MG/ML
1 INJECTION INTRAMUSCULAR; INTRAVENOUS
Qty: 20 | Refills: 0 | Status: DISCONTINUED | OUTPATIENT
Start: 2022-02-08 | End: 2022-02-14

## 2022-02-08 RX ORDER — BUMETANIDE 0.25 MG/ML
1 INJECTION INTRAMUSCULAR; INTRAVENOUS
Qty: 20 | Refills: 0 | Status: DISCONTINUED | OUTPATIENT
Start: 2022-02-08 | End: 2022-02-08

## 2022-02-08 RX ORDER — BUMETANIDE 0.25 MG/ML
2 INJECTION INTRAMUSCULAR; INTRAVENOUS ONCE
Refills: 0 | Status: COMPLETED | OUTPATIENT
Start: 2022-02-08 | End: 2022-02-08

## 2022-02-08 RX ADMIN — Medication 145 MILLIGRAM(S): at 12:23

## 2022-02-08 RX ADMIN — SACUBITRIL AND VALSARTAN 1 TABLET(S): 24; 26 TABLET, FILM COATED ORAL at 17:46

## 2022-02-08 RX ADMIN — Medication 50 MILLIGRAM(S): at 05:49

## 2022-02-08 RX ADMIN — MAGNESIUM OXIDE 400 MG ORAL TABLET 400 MILLIGRAM(S): 241.3 TABLET ORAL at 08:24

## 2022-02-08 RX ADMIN — CLOPIDOGREL BISULFATE 75 MILLIGRAM(S): 75 TABLET, FILM COATED ORAL at 12:23

## 2022-02-08 RX ADMIN — SACUBITRIL AND VALSARTAN 1 TABLET(S): 24; 26 TABLET, FILM COATED ORAL at 05:49

## 2022-02-08 RX ADMIN — PANTOPRAZOLE SODIUM 40 MILLIGRAM(S): 20 TABLET, DELAYED RELEASE ORAL at 05:49

## 2022-02-08 RX ADMIN — MAGNESIUM OXIDE 400 MG ORAL TABLET 400 MILLIGRAM(S): 241.3 TABLET ORAL at 17:46

## 2022-02-08 RX ADMIN — Medication 2: at 08:25

## 2022-02-08 RX ADMIN — BUMETANIDE 5 MG/HR: 0.25 INJECTION INTRAMUSCULAR; INTRAVENOUS at 16:55

## 2022-02-08 RX ADMIN — SPIRONOLACTONE 25 MILLIGRAM(S): 25 TABLET, FILM COATED ORAL at 05:49

## 2022-02-08 RX ADMIN — MAGNESIUM OXIDE 400 MG ORAL TABLET 400 MILLIGRAM(S): 241.3 TABLET ORAL at 12:23

## 2022-02-08 RX ADMIN — Medication 40 MILLIGRAM(S): at 05:48

## 2022-02-08 RX ADMIN — QUETIAPINE FUMARATE 25 MILLIGRAM(S): 200 TABLET, FILM COATED ORAL at 22:21

## 2022-02-08 RX ADMIN — Medication 81 MILLIGRAM(S): at 12:23

## 2022-02-08 RX ADMIN — BUMETANIDE 2 MILLIGRAM(S): 0.25 INJECTION INTRAMUSCULAR; INTRAVENOUS at 16:55

## 2022-02-08 RX ADMIN — ATORVASTATIN CALCIUM 80 MILLIGRAM(S): 80 TABLET, FILM COATED ORAL at 22:20

## 2022-02-08 RX ADMIN — BUDESONIDE AND FORMOTEROL FUMARATE DIHYDRATE 2 PUFF(S): 160; 4.5 AEROSOL RESPIRATORY (INHALATION) at 08:24

## 2022-02-08 RX ADMIN — ENOXAPARIN SODIUM 40 MILLIGRAM(S): 100 INJECTION SUBCUTANEOUS at 12:24

## 2022-02-08 NOTE — CONSULT NOTE ADULT - ASSESSMENT
Acute on chronic systolic HF / Fluid overload / pulmonary HTN / COPD / DM2 / Anxiety       Continue Entresto 49-51 mg BID   Continue metoprolol XL 50 mg daily   Continue Spironolactone 25 mg daily   Get BMP twice daily   Mag 1.8 today- replete  Maintain potassium >4.0, Mg >2.1  Strict intake and output  Daily weight   Plan discussed with primary team  Will continue to follow     *INCOMPLETE NOTE* Acute on chronic systolic HF / Fluid overload / pulmonary HTN / COPD / DM2 / Anxiety     Continue Entresto 49-51 mg BID   Continue metoprolol XL 50 mg daily   Continue Spironolactone 25 mg daily   Get BMP twice daily   Mag 1.8 today- replete  Maintain potassium >4.0, Mg >2.1  Strict intake and output  Daily weight   Plan discussed with primary team  Will continue to follow     *INCOMPLETE NOTE* Acute on chronic systolic HF / Fluid overload / pulmonary HTN / COPD / DM2 / Anxiety     Patient is fluid overloaded on exam- POCUS exam: IVC 2.8cm, non collapsing   Continue Entresto 49-51 mg BID   Continue metoprolol XL 50 mg daily   Continue Spironolactone 25 mg daily   Get BMP twice daily   Mag 1.8 today- replete  Maintain potassium >4.0, Mg >2.1  Strict intake and output  Daily weight   Plan discussed with primary team  Will continue to follow     *INCOMPLETE NOTE* Acute on chronic systolic HF / Fluid overload / pulmonary HTN / COPD / DM2 / Anxiety     Patient is fluid overloaded on exam- POCUS exam: IVC 2.8cm, non collapsing   Stop Furosemide IVP  Give Bumex 2 mg IV push, then start a Bumex gtt at 1 mg/hr  Continue Entresto 49-51 mg BID   Continue metoprolol XL 50 mg daily   Continue Spironolactone 25 mg daily   Get BMP twice daily   Mag 1.8 today- replete  Maintain potassium >4.0, Mg >2.1  Strict intake and output  Daily weight   Plan discussed with primary team  Will continue to follow    Acute on chronic systolic HF / Fluid overload / pulmonary HTN / COPD / DM2 / Anxiety     Patient is fluid overloaded on exam- POCUS exam: IVC 2.8cm, non collapsing   Stop Furosemide IVP  Give Bumex 2 mg IV push, then start a Bumex gtt at 1 mg/hr  Continue Entresto 49-51 mg BID   Continue metoprolol XL 50 mg daily   Continue Spironolactone 25 mg daily   Get BMP twice daily   Mag 1.8 today- replete  Maintain potassium >4.0, Mg >2.1  Strict intake and output  Daily weight   Plan discussed with primary team  Will continue to follow.

## 2022-02-08 NOTE — PROGRESS NOTE ADULT - SUBJECTIVE AND OBJECTIVE BOX
SUBJECTIVE:    Patient is a 56y old Female who presents with a chief complaint of Shortness of breath (08 Feb 2022 08:17)    Currently admitted to medicine with the primary diagnosis of Pericardial effusion       Today is hospital day 2d. This morning she is resting comfortably in bed and reports no new issues or overnight events.     PAST MEDICAL & SURGICAL HISTORY  Hypertension    Hyperlipidemia    Anxiety and depression    COPD, severe    CHF (congestive heart failure)    Cerebrovascular accident (CVA)  Multiple    Type 2 diabetes mellitus    CKD (chronic kidney disease), stage II    No significant past surgical history      SOCIAL HISTORY:  Negative for smoking/alcohol/drug use.     ALLERGIES:  No Known Allergies    MEDICATIONS:  STANDING MEDICATIONS  aspirin enteric coated 81 milliGRAM(s) Oral daily  atorvastatin 80 milliGRAM(s) Oral at bedtime  budesonide 160 MICROgram(s)/formoterol 4.5 MICROgram(s) Inhaler 2 Puff(s) Inhalation two times a day  clopidogrel Tablet 75 milliGRAM(s) Oral daily  enoxaparin Injectable 40 milliGRAM(s) SubCutaneous daily  fenofibrate Tablet 145 milliGRAM(s) Oral daily  furosemide   Injectable 40 milliGRAM(s) IV Push two times a day  influenza   Vaccine 0.5 milliLiter(s) IntraMuscular once  insulin lispro (ADMELOG) corrective regimen sliding scale   SubCutaneous Before meals and at bedtime  magnesium oxide 400 milliGRAM(s) Oral three times a day with meals  metoprolol succinate ER 50 milliGRAM(s) Oral daily  pantoprazole    Tablet 40 milliGRAM(s) Oral before breakfast  QUEtiapine 25 milliGRAM(s) Oral at bedtime  sacubitril 49 mG/valsartan 51 mG 1 Tablet(s) Oral two times a day  spironolactone 25 milliGRAM(s) Oral daily    PRN MEDICATIONS  ALBUTerol    90 MICROgram(s) HFA Inhaler 2 Puff(s) Inhalation every 6 hours PRN    VITALS:   T(F): 96.1  HR: 95  BP: 124/92  RR: 18  SpO2: 99%    LABS:                        14.1   6.30  )-----------( 283      ( 08 Feb 2022 06:30 )             44.4     02-08    137  |  97<L>  |  20  ----------------------------<  140<H>  4.0   |  24  |  1.2    Ca    9.1      08 Feb 2022 06:30  Phos  3.3     02-07  Mg     1.8     02-08    TPro  7.0  /  Alb  3.7  /  TBili  2.3<H>  /  DBili  x   /  AST  15  /  ALT  8   /  AlkPhos  100  02-07              CARDIAC MARKERS ( 07 Feb 2022 06:32 )  x     / 0.02 ng/mL / x     / x     / x      CARDIAC MARKERS ( 06 Feb 2022 10:50 )  x     / 0.02 ng/mL / x     / x     / x          RADIOLOGY:    PHYSICAL EXAM:  GEN: No acute distress  LUNGS: Clear to auscultation bilaterally   HEART: Regular  ABD: Soft, non-tender, non-distended.  EXT: NC/NC/NE/2+PP/ROLDAN/Skin Intact.   NEURO: AAOX3    Intravenous access:   NG tube:   Wilkerson Catheter:

## 2022-02-08 NOTE — PROGRESS NOTE ADULT - ASSESSMENT
55yo female with PMH of COPD (on 4L of home O2), CORNELIUS on CPAP, HFrEF (15-20% on TTE Nov 2021), severe pulmonary HTN, CAD s/p PCI to RCA, HTN, HLD, CVA with residual LLE weakness, DM, recently quit smoking who was BIBA for dyspnea x 2 days. Per EMS, patient was put on NRB @ 10L and received albuterol x 1 with improvement en route.  In the ED patient was tachycardic to 121, elevated troponin to 0.02, and BNP of 4892. CTA revealed cardiomegaly with asymmetric right heart dilation and mild/moderate pericardial effusion. In the ED patient received 40mg of Lasix. Physical exam findings, imaging, and labs as documented above.     PLAN:  - ECHO: Small pericardial effusion adjacent to the right atrium.  - no acute surgical intervention  - Please reconsult CT surgery as needed

## 2022-02-08 NOTE — PROGRESS NOTE ADULT - ASSESSMENT
55 YO F w/ PMHx of COPD (on 3L home O2), CORNELIUS on CPAP, HFrEF (15-20% on TTE 11/2021), severe pulmonary HTN, CAD s/p PCI to RCA, HTN/HLD, CVA with residual LLE weakness, T2DM, recently quit smoking (1 month ago) presented for shortness of breath that worsened last night.     #Dyspnea; multifactorial   #Hx of HFrEF; BNP around baseline   #Hx of COPD on 3L home O2  #Hx of severe pul-HTN  #Mild-mod pericardial effusion; low probability of tamponade    - CT Angio Chest PE Protocol w/ IV Cont: No PE. Cardiomegaly with asymmetric right heart dilatation. Mild/moderate pericardial effusion, slightly increased since prior. Small bilateral pleural effusions, slightly increased since prior.   - TTE 11/14/2021: EF 15-20%, G2DD, mod MR, mild-mod TR, severe P-HTN, small pericardial effusion. Follow repeat Echo  - s/p 40mg IV lasix in the ED. Increase lasix 40mg qD to BID  - Echo 02/06: 19% EF, small pericardial effusion, moderate p-HTN, mod-severe MR  - Troponin 0.02; around baseline. Follow repeat   - Daily weight. Strict I & Os. Keep I < O. Fluid restriction  - Continue Metoprolol 50mg PO QD, Entresto 49-51 BID and spironolactone 25mg PO QD  - No intervention per sx team, resume diet    #CAD s/p PCI to RCA - Continue ASA 81mg PO QD, Plavix 75mg PO QD, Metoprolol 50mg PO QD    #CORNELIUS on CPAP - CPAP at night     #Hx of CVA with residual weakness   #HTN/DLD  - Continue atorvastatin 80mg PO QD, ASA 81mg PO QD, Plavix 75mg PO QD, fenofibrate 145mg PO QD    #Diabetes Mellitus type II   - Last HbA1C 7.7 % (11-10-21)  - Will hold home medications  - Monitor FS. Start insulin if FS > 180. Keep between 140 - 180. Carb consistent diet    #Misc  - DVT Prophylaxis: Lovenox  - Diet: DASH/TLC, CC, fluid restriction  - GI Prophylaxis: pantoprazole   - Activity: AAT  - Code Status: Full Code    Dispo: Admit to tele   55 YO F w/ PMHx of COPD (on 3L home O2), CORNELIUS on CPAP, HFrEF (15-20% on TTE 11/2021), severe pulmonary HTN, CAD s/p PCI to RCA, HTN/HLD, CVA with residual LLE weakness, T2DM, recently quit smoking (1 month ago) presented for shortness of breath that worsened last night.     #Acute CHF exacerbation  #Hx of HFrEF; BNP around baseline   #Hx of COPD on 3L home O2  #Hx of severe pul-HTN  #Mild-mod pericardial effusion; low probability of tamponade    - CT Angio Chest PE Protocol w/ IV Cont: No PE. Cardiomegaly with asymmetric right heart dilatation. Mild/moderate pericardial effusion, slightly increased since prior. Small bilateral pleural effusions, slightly increased since prior.   - TTE 11/14/2021: EF 15-20%, G2DD, mod MR, mild-mod TR, severe P-HTN, small pericardial effusion.  - Echo 02/06: 19% EF, small pericardial effusion, moderate p-HTN, mod-severe MR  - Troponin 0.02; around baseline.  - Daily weight. Strict I & Os. Keep I < O. Fluid restriction  - Continue Metoprolol 50mg PO QD, Entresto 49-51 BID and spironolactone 25mg PO QD  - No intervention per CTS  - d/c lasix  - start bumex gtt on 2/8, BMP BID    #CAD s/p PCI to RCA - Continue ASA 81mg PO QD, Plavix 75mg PO QD, Metoprolol 50mg PO QD    #CORNELIUS on CPAP - CPAP at night     #Hx of CVA with residual weakness   #HTN/DLD  - Continue atorvastatin 80mg PO QD, ASA 81mg PO QD, Plavix 75mg PO QD, fenofibrate 145mg PO QD    #Diabetes Mellitus type II   - Last HbA1C 7.7 % (11-10-21)  - Will hold home medications  - Monitor FS. Start insulin if FS > 180. Keep between 140 - 180. Carb consistent diet    #Misc  - DVT Prophylaxis: Lovenox  - Diet: DASH/TLC, CC, fluid restriction  - GI Prophylaxis: pantoprazole   - Activity: AAT  - Code Status: Full Code  Dispo acute

## 2022-02-08 NOTE — PROGRESS NOTE ADULT - SUBJECTIVE AND OBJECTIVE BOX
THORACIC SURGERY PROGRESS NOTE      Events of past 24 hours:  SAMARIA PALACIOS otherwise negative except per subjective and HPI      Vital Signs Last 24 Hrs  T(C): 35.6 (08 Feb 2022 05:17), Max: 35.7 (07 Feb 2022 15:33)  T(F): 96.1 (08 Feb 2022 05:17), Max: 96.3 (07 Feb 2022 15:33)  HR: 95 (08 Feb 2022 05:17) (73 - 101)  BP: 124/92 (08 Feb 2022 05:17) (100/70 - 127/92)  BP(mean): --  RR: 18 (08 Feb 2022 05:17) (18 - 20)  SpO2: 99% (07 Feb 2022 19:48) (99% - 100%)    Diet, NPO after Midnight:      NPO Start Date: 06-Feb-2022,   NPO Start Time: 23:59  Except Medications (02-06-22 @ 17:20) [Active]  Diet, NPO after Midnight:      NPO Start Date: 06-Feb-2022,   NPO Start Time: 23:59  Except Medications (02-06-22 @ 17:18) [Active]  Diet, NPO after Midnight:      NPO Start Date: 06-Feb-2022,   NPO Start Time: 23:59 (02-06-22 @ 17:17) [Active]  Diet, DASH/TLC:   Sodium & Cholesterol Restricted  Consistent Carbohydrate Evening Snack  1000mL Fluid Restriction ([id number]) (02-06-22 @ 17:17) [Active]          I&O's Detail    07 Feb 2022 07:01  -  08 Feb 2022 07:00  --------------------------------------------------------  IN:    Oral Fluid: 400 mL  Total IN: 400 mL    OUT:    Voided (mL): 800 mL  Total OUT: 800 mL    Total NET: -400 mL    MEDICATIONS:   MEDICATIONS  (STANDING):  aspirin enteric coated 81 milliGRAM(s) Oral daily  atorvastatin 80 milliGRAM(s) Oral at bedtime  budesonide 160 MICROgram(s)/formoterol 4.5 MICROgram(s) Inhaler 2 Puff(s) Inhalation two times a day  clopidogrel Tablet 75 milliGRAM(s) Oral daily  enoxaparin Injectable 40 milliGRAM(s) SubCutaneous daily  fenofibrate Tablet 145 milliGRAM(s) Oral daily  furosemide   Injectable 40 milliGRAM(s) IV Push two times a day  influenza   Vaccine 0.5 milliLiter(s) IntraMuscular once  insulin lispro (ADMELOG) corrective regimen sliding scale   SubCutaneous Before meals and at bedtime  magnesium oxide 400 milliGRAM(s) Oral three times a day with meals  metoprolol succinate ER 50 milliGRAM(s) Oral daily  pantoprazole    Tablet 40 milliGRAM(s) Oral before breakfast  QUEtiapine 25 milliGRAM(s) Oral at bedtime  sacubitril 49 mG/valsartan 51 mG 1 Tablet(s) Oral two times a day  spironolactone 25 milliGRAM(s) Oral daily    MEDICATIONS  (PRN):  ALBUTerol    90 MICROgram(s) HFA Inhaler 2 Puff(s) Inhalation every 6 hours PRN Bronchospasm        LAB/STUDIES:                        14.1   6.30  )-----------( 283      ( 08 Feb 2022 06:30 )             44.4     02-08    137  |  97<L>  |  20  ----------------------------<  140<H>  4.0   |  24  |  1.2    Ca    9.1      08 Feb 2022 06:30  Phos  3.3     02-07  Mg     1.8     02-08    TPro  7.0  /  Alb  3.7  /  TBili  2.3<H>  /  DBili  x   /  AST  15  /  ALT  8   /  AlkPhos  100  02-07      LIVER FUNCTIONS - ( 07 Feb 2022 06:32 )  Alb: 3.7 g/dL / Pro: 7.0 g/dL / ALK PHOS: 100 U/L / ALT: 8 U/L / AST: 15 U/L / GGT: x             CARDIAC MARKERS ( 07 Feb 2022 06:32 )  x     / 0.02 ng/mL / x     / x     / x      CARDIAC MARKERS ( 06 Feb 2022 10:50 )  x     / 0.02 ng/mL / x     / x     / x

## 2022-02-08 NOTE — PROGRESS NOTE ADULT - ATTENDING COMMENTS
Pt seen and examined 02/08. No acute complaints/concerns at this time. Still appears fluid overloaded. Per heart failure team recds, will start bumex drip. Cont to monitor I/Os, fluid restriction.

## 2022-02-08 NOTE — CONSULT NOTE ADULT - SUBJECTIVE AND OBJECTIVE BOX
Date of Admission: 22    HISTORY OF PRESENT ILLNESS: 57 YO F w/ PMHx of COPD (on 3L home O2), CORNELIUS on CPAP, HFrEF (15-20% on TTE 2021), severe pulmonary HTN, CAD s/p PCI to RCA, HTN/HLD, CVA with residual LLE weakness, T2DM, recently quit smoking (1 month ago) presented for shortness of breath that worsened last night. As per the patient she was noticing worsening LE swelling and increased SOB for the last few days. Last last she had chinese food and then her SOB worsened. Per EMS, patient was placed on NRB 10L and one albuterol treatment with improvement en route. Pt was recently admitted in 2021 for b/l lower extremity edema and was found to be COVID-19 positive at the time. Reports being vaccinated against COVID-19.   Denies fever/chills. No chest pain, palpitations, abd pain, N/V/D. Has cough with sputum at baseline, denies any change in cough or sputum. Pt wheelchair bound at baseline.  In the ED VS were sig for a HR of 121 bpm. She was placed on BiPAP and then transitioned to NC  CT angio showed mild-mod pericardial effusion. ED spoke with CT surgery and they will evaluate  (2022 16:33)        Patient well known to heart failure team from multiple previous admissions.           PAST MEDICAL & SURGICAL HISTORY:  Hypertension  Hyperlipidemia  Anxiety and depression  COPD, severe  CHF (congestive heart failure)  Cerebrovascular accident (CVA)Multiple  Type 2 diabetes mellitus  CKD (chronic kidney disease), stage II  No significant past surgical history        FAMILY HISTORY:  No pertinent family history of premature cardiovascular disease in first degree relatives.  Mother:  of cancer at 65  Father:  of an overdose at 50    SOCIAL HISTORY:    Current every day smoker, 2 cigarettes a day, denies alcohol or drug use    Allergies  No Known Allergies    Intolerances    	    REVIEW OF SYSTEMS:  CONSTITUTIONAL: No fever, weight loss, or fatigue  CARDIOLOGY: denies chest pain, + shortness of breath, no syncopal episodes.   RESPIRATORY: + shortness of breath, + wheezing.   NEUROLOGICAL: no weakness, no focal deficits to report.  ENDOCRINOLOGICAL: no recent change in diabetic medications.   GI: no BRBPR, no N,V, diarrhea.    PSYCHIATRY: normal mood and affect  HEENT: no nasal discharge, no ecchymosis  SKIN: no ecchymosis, no breakdown  MUSCULOSKELETAL: Full range of motion x4. WC bound    PHYSICAL EXAM:  General Appearance: well appearing, normal for age and gender. 	  Neck: unable to assess due to body habitus JVP, no bruit.   Eyes: Extra Ocular muscles intact.   Cardiovascular: regular rate and rhythm S1 S2, , No murmurs, Generalized edema  Respiratory: B/L expiratory wheezing  Psychiatry: Alert and oriented x 3, Mood & affect appropriate  Gastrointestinal:  Soft, Non-tender  Skin/Integumentary : No rashes, No ecchymoses, No cyanosis	  Neurologic: Non-focal  Musculoskeletal/ extremities: Normal range of motion, No clubbing, cyanosis   Vascular: Peripheral pulses palpable 2+ bilaterally    CARDIAC MARKERS:  Serum Pro-Brain Natriuretic Peptide: 4892 pg/mL (22 @ 10:50)      TELEMETRY EVENTS: 	    EC/6/22      Ventricular Rate 116 BPM  Atrial Rate 116 BPM  P-R Interval 148 ms  QRS Duration 148 ms  Q-T Interval 346 ms  QTC Calculation(Bazett) 480 ms  R Axis 162 degrees  T Axis -11 degrees    Diagnosis Line Sinus tachycardia  Right axis deviation  Non-specific intra-ventricular conduction delay  Abnormal ECG    Confirmed by Elenita Combs MD ([id number]) on 2022 4:06:55 PM        PREVIOUS DIAGNOSTIC TESTING:      TTE 22    Summary:   1. LV Ejection Fraction by Urena's Method with a biplane EF of 19 %.   2. Severely decreased global left ventricular systolic function.   3. Dilated RV with moderately reduced systolic function.   4. Moderately enlarged left atrium.   5. Severely enlarged right atrium.   6. Sclerotic aorticvalve with normal opening.   7. Mild aortic regurgitation.   8. The mitral valve leaflets are tethered due to reduced systolic   function and elevated LVDP.   9. Moderate-to-severe mitral regurgitation.  10. Severe tricuspid regurgitation.  11. Estimated pulmonary artery systolic pressure is 53.7 mmHg assuming a   right atrial pressure of 15 mmHg, which is consistent with moderate   pulmonary hypertension.  12. Small pericardial effusion adjacent to the right atrium.        TTE 21    Summary:   1. Left ventricular ejection fraction, by visual estimation, is 35 to 40%.   2.Moderately decreased global left ventricular systolic function.   3. Multiple left ventricular regional wall motion abnormalities exist. See wall motion findings.   4. Elevated left atrial and left ventricular end-diastolic pressures.   5. Mild concentric left ventricular hypertrophy.   6. Mildly increased LV wall thickness.   7. Normal left ventricular internal cavity size.   8. Spectral Doppler shows restrictive pattern of left ventricular myocardial filling (Grade III diastolic dysfunction).  9. Moderately reduced RV systolic function.  10. Mildly enlarged left atrium.  11. Moderately enlarged right atrium.  12. Small pericardial effusion.  13. Mild to moderate mitral valve regurgitation.  14. Moderate-severe tricuspid regurgitation.  15.Mild aortic regurgitation.  16. Sclerotic aortic valve with normal opening.  17. Estimated pulmonary artery systolic pressure is 50.0 mmHg assuming a right atrial pressure of 15 mmHg, which is consistent with moderate pulmonary hypertension.  18. Pulmonary hypertension is present.  19. LA volume Index is 36.7 ml/m² ml/m2.    	    Home Medications:  Albuterol (Eqv-ProAir HFA) 90 mcg/inh inhalation aerosol: 2 puff(s) inhaled every 6 hours, As Needed (2021 11:22)  aspirin 81 mg oral tablet: 1 tab(s) orally once a day (2021 11:22)  fenofibrate 145 mg oral tablet: 1 tab(s) orally once a day (2021 11:22)  glipiZIDE 10 mg oral tablet: 1 tab(s) orally 2 times a day (2021 11:22)  Lovaza 1000 mg oral capsule: 2 cap(s) orally 2 times a day (2021 14:52)  metFORMIN 1000 mg oral tablet: 1 tab(s) orally 2 times a day Do not take until  (2021 12:58)  Plavix 75 mg oral tablet: 1 tab(s) orally once a day (2021 11:22)  Vitamin D2 1.25 mg (50,000 intl units) oral capsule: 1 cap(s) orally once a week (2021 14:52)    MEDICATIONS  (STANDING):  aspirin  chewable 81 milliGRAM(s) Oral daily  atorvastatin 80 milliGRAM(s) Oral at bedtime  budesonide 160 MICROgram(s)/formoterol 4.5 MICROgram(s) Inhaler 2 Puff(s) Inhalation two times a day  chlorhexidine 4% Liquid 1 Application(s) Topical <User Schedule>  clopidogrel Tablet 75 milliGRAM(s) Oral daily  enoxaparin Injectable 40 milliGRAM(s) SubCutaneous daily  fenofibrate Tablet 145 milliGRAM(s) Oral daily  furosemide   Injectable 40 milliGRAM(s) IV Push two times a day  magnesium sulfate  IVPB 2 Gram(s) IV Intermittent once  metoprolol succinate ER 50 milliGRAM(s) Oral daily  omega-3-Acid Ethyl Esters 2 Gram(s) Oral two times a day  pantoprazole    Tablet 40 milliGRAM(s) Oral before breakfast  sacubitril 49 mG/valsartan 51 mG 1 Tablet(s) Oral two times a day    MEDICATIONS  (PRN):  ALBUTerol    90 MICROgram(s) HFA Inhaler 2 Puff(s) Inhalation every 6 hours PRN Shortness of Breath and/or Wheezing         Date of Admission: 22    HISTORY OF PRESENT ILLNESS: 55 YO F w/ PMHx of COPD (on 3L home O2), CORNELIUS on CPAP, HFrEF (15-20% on TTE 2021), severe pulmonary HTN, CAD s/p PCI to RCA, HTN/HLD, CVA with residual LLE weakness, T2DM, recently quit smoking (1 month ago) presented for shortness of breath that worsened last night. As per the patient she was noticing worsening LE swelling and increased SOB for the last few days. Last last she had chinese food and then her SOB worsened. Per EMS, patient was placed on NRB 10L and one albuterol treatment with improvement en route. Pt was recently admitted in 2021 for b/l lower extremity edema and was found to be COVID-19 positive at the time. Reports being vaccinated against COVID-19.   Denies fever/chills. No chest pain, palpitations, abd pain, N/V/D. Has cough with sputum at baseline, denies any change in cough or sputum. Pt wheelchair bound at baseline.  In the ED VS were sig for a HR of 121 bpm. She was placed on BiPAP and then transitioned to NC  CT angio showed mild-mod pericardial effusion. ED spoke with CT surgery and they will evaluate  (2022 16:33)      Patient well known to heart failure team from multiple previous admissions. Patient reports progressively worsening SOB and LE edema x3 days, exacerbated by non compliance with low Na diet (Chinese take out food) the night before admission. Patient also noticed abdominal bloating, decreased appetite, and pain in legs while walking right before admission as well. Denies chest pain, palpitations, n/v, cough, or abnormal bleeding.         PAST MEDICAL & SURGICAL HISTORY:  Hypertension  Hyperlipidemia  Anxiety and depression  COPD, severe  CHF (congestive heart failure)  Cerebrovascular accident (CVA)Multiple  Type 2 diabetes mellitus  CKD (chronic kidney disease), stage II  No significant past surgical history        FAMILY HISTORY:  No pertinent family history of premature cardiovascular disease in first degree relatives.  Mother:  of cancer at 65  Father:  of an overdose at 50    SOCIAL HISTORY:    Current every day smoker, 2 cigarettes a day, denies alcohol or drug use    Allergies  No Known Allergies    Intolerances    	    REVIEW OF SYSTEMS:  CONSTITUTIONAL: No fever, weight loss, or fatigue  CARDIOLOGY: denies chest pain, + shortness of breath, no syncopal episodes.   RESPIRATORY: + shortness of breath, + wheezing.   NEUROLOGICAL: no weakness, no focal deficits to report.  ENDOCRINOLOGICAL: no recent change in diabetic medications.   GI: no BRBPR, no N,V, diarrhea.    PSYCHIATRY: normal mood and affect  HEENT: no nasal discharge, no ecchymosis  SKIN: no ecchymosis, no breakdown  MUSCULOSKELETAL: Full range of motion x4. WC bound    PHYSICAL EXAM:  General Appearance: well appearing, normal for age and gender. 	  Neck: unable to assess due to body habitus  Eyes: Extra Ocular muscles intact.   Cardiovascular: regular rate and rhythm S1 S2, , No murmurs,+2 B/L pedal edema  Respiratory: B/L anterior expiratory wheezing, posterior lung fields clear  Psychiatry: Alert and oriented x 3, Mood & affect appropriate  Gastrointestinal:  Soft, Non-tender  Skin/Integumentary : No rashes, No ecchymoses, No cyanosis	  Neurologic: Non-focal  Musculoskeletal/ extremities: Normal range of motion, No clubbing, cyanosis   Vascular: Peripheral pulses palpable 2+ bilaterally    CARDIAC MARKERS:  Serum Pro-Brain Natriuretic Peptide: 4892 pg/mL (22 @ 10:50)      TELEMETRY EVENTS: 	    EC/6/22      Ventricular Rate 116 BPM  Atrial Rate 116 BPM  P-R Interval 148 ms  QRS Duration 148 ms  Q-T Interval 346 ms  QTC Calculation(Bazett) 480 ms  R Axis 162 degrees  T Axis -11 degrees    Diagnosis Line Sinus tachycardia  Right axis deviation  Non-specific intra-ventricular conduction delay  Abnormal ECG    Confirmed by Elenita Combs MD ([id number]) on 2022 4:06:55 PM        PREVIOUS DIAGNOSTIC TESTING:      TTE 22    Summary:   1. LV Ejection Fraction by Urena's Method with a biplane EF of 19 %.   2. Severely decreased global left ventricular systolic function.   3. Dilated RV with moderately reduced systolic function.   4. Moderately enlarged left atrium.   5. Severely enlarged right atrium.   6. Sclerotic aorticvalve with normal opening.   7. Mild aortic regurgitation.   8. The mitral valve leaflets are tethered due to reduced systolic   function and elevated LVDP.   9. Moderate-to-severe mitral regurgitation.  10. Severe tricuspid regurgitation.  11. Estimated pulmonary artery systolic pressure is 53.7 mmHg assuming a   right atrial pressure of 15 mmHg, which is consistent with moderate   pulmonary hypertension.  12. Small pericardial effusion adjacent to the right atrium.        TTE 21    Summary:   1. Left ventricular ejection fraction, by visual estimation, is 35 to 40%.   2.Moderately decreased global left ventricular systolic function.   3. Multiple left ventricular regional wall motion abnormalities exist. See wall motion findings.   4. Elevated left atrial and left ventricular end-diastolic pressures.   5. Mild concentric left ventricular hypertrophy.   6. Mildly increased LV wall thickness.   7. Normal left ventricular internal cavity size.   8. Spectral Doppler shows restrictive pattern of left ventricular myocardial filling (Grade III diastolic dysfunction).  9. Moderately reduced RV systolic function.  10. Mildly enlarged left atrium.  11. Moderately enlarged right atrium.  12. Small pericardial effusion.  13. Mild to moderate mitral valve regurgitation.  14. Moderate-severe tricuspid regurgitation.  15.Mild aortic regurgitation.  16. Sclerotic aortic valve with normal opening.  17. Estimated pulmonary artery systolic pressure is 50.0 mmHg assuming a right atrial pressure of 15 mmHg, which is consistent with moderate pulmonary hypertension.  18. Pulmonary hypertension is present.  19. LA volume Index is 36.7 ml/m² ml/m2.    	    Home Medications:  Albuterol (Eqv-ProAir HFA) 90 mcg/inh inhalation aerosol: 2 puff(s) inhaled every 6 hours, As Needed (2021 11:22)  aspirin 81 mg oral tablet: 1 tab(s) orally once a day (2021 11:22)  fenofibrate 145 mg oral tablet: 1 tab(s) orally once a day (2021 11:22)  glipiZIDE 10 mg oral tablet: 1 tab(s) orally 2 times a day (2021 11:22)  Lovaza 1000 mg oral capsule: 2 cap(s) orally 2 times a day (2021 14:52)  metFORMIN 1000 mg oral tablet: 1 tab(s) orally 2 times a day Do not take until  (2021 12:58)  Plavix 75 mg oral tablet: 1 tab(s) orally once a day (2021 11:22)  Vitamin D2 1.25 mg (50,000 intl units) oral capsule: 1 cap(s) orally once a week (2021 14:52)    MEDICATIONS  (STANDING):  aspirin  chewable 81 milliGRAM(s) Oral daily  atorvastatin 80 milliGRAM(s) Oral at bedtime  budesonide 160 MICROgram(s)/formoterol 4.5 MICROgram(s) Inhaler 2 Puff(s) Inhalation two times a day  chlorhexidine 4% Liquid 1 Application(s) Topical <User Schedule>  clopidogrel Tablet 75 milliGRAM(s) Oral daily  enoxaparin Injectable 40 milliGRAM(s) SubCutaneous daily  fenofibrate Tablet 145 milliGRAM(s) Oral daily  furosemide   Injectable 40 milliGRAM(s) IV Push two times a day  magnesium sulfate  IVPB 2 Gram(s) IV Intermittent once  metoprolol succinate ER 50 milliGRAM(s) Oral daily  omega-3-Acid Ethyl Esters 2 Gram(s) Oral two times a day  pantoprazole    Tablet 40 milliGRAM(s) Oral before breakfast  sacubitril 49 mG/valsartan 51 mG 1 Tablet(s) Oral two times a day    MEDICATIONS  (PRN):  ALBUTerol    90 MICROgram(s) HFA Inhaler 2 Puff(s) Inhalation every 6 hours PRN Shortness of Breath and/or Wheezing

## 2022-02-08 NOTE — PROGRESS NOTE ADULT - ATTENDING COMMENTS
56 y.o. F with multiple medical problems, HTN, CAD, CHF. Thoracic surgery consulted for pericardial effusion. Patient admitted for shortness of breath, found tachycardic in ED. CT chest ruled out PE but revealed small pericardial effusion. Follow up echocardiogram with no evidence of tamponade, small anterior pericardial effusion. No intervention indicated. Medical treatment of decompensated CHF.

## 2022-02-09 LAB
ANION GAP SERPL CALC-SCNC: 15 MMOL/L — HIGH (ref 7–14)
ANION GAP SERPL CALC-SCNC: 15 MMOL/L — HIGH (ref 7–14)
BASOPHILS # BLD AUTO: 0.11 K/UL — SIGNIFICANT CHANGE UP (ref 0–0.2)
BASOPHILS NFR BLD AUTO: 1.8 % — HIGH (ref 0–1)
BUN SERPL-MCNC: 19 MG/DL — SIGNIFICANT CHANGE UP (ref 10–20)
BUN SERPL-MCNC: 19 MG/DL — SIGNIFICANT CHANGE UP (ref 10–20)
CALCIUM SERPL-MCNC: 8.6 MG/DL — SIGNIFICANT CHANGE UP (ref 8.5–10.1)
CALCIUM SERPL-MCNC: 8.9 MG/DL — SIGNIFICANT CHANGE UP (ref 8.5–10.1)
CHLORIDE SERPL-SCNC: 93 MMOL/L — LOW (ref 98–110)
CHLORIDE SERPL-SCNC: 94 MMOL/L — LOW (ref 98–110)
CO2 SERPL-SCNC: 26 MMOL/L — SIGNIFICANT CHANGE UP (ref 17–32)
CO2 SERPL-SCNC: 30 MMOL/L — SIGNIFICANT CHANGE UP (ref 17–32)
CREAT SERPL-MCNC: 1.2 MG/DL — SIGNIFICANT CHANGE UP (ref 0.7–1.5)
CREAT SERPL-MCNC: 1.3 MG/DL — SIGNIFICANT CHANGE UP (ref 0.7–1.5)
EOSINOPHIL # BLD AUTO: 0.1 K/UL — SIGNIFICANT CHANGE UP (ref 0–0.7)
EOSINOPHIL NFR BLD AUTO: 1.6 % — SIGNIFICANT CHANGE UP (ref 0–8)
GLUCOSE BLDC GLUCOMTR-MCNC: 140 MG/DL — HIGH (ref 70–99)
GLUCOSE BLDC GLUCOMTR-MCNC: 150 MG/DL — HIGH (ref 70–99)
GLUCOSE SERPL-MCNC: 144 MG/DL — HIGH (ref 70–99)
GLUCOSE SERPL-MCNC: 177 MG/DL — HIGH (ref 70–99)
HCT VFR BLD CALC: 48.2 % — HIGH (ref 37–47)
HGB BLD-MCNC: 15.4 G/DL — SIGNIFICANT CHANGE UP (ref 12–16)
IMM GRANULOCYTES NFR BLD AUTO: 0.5 % — HIGH (ref 0.1–0.3)
LYMPHOCYTES # BLD AUTO: 1.02 K/UL — LOW (ref 1.2–3.4)
LYMPHOCYTES # BLD AUTO: 16.7 % — LOW (ref 20.5–51.1)
MAGNESIUM SERPL-MCNC: 1.7 MG/DL — LOW (ref 1.8–2.4)
MCHC RBC-ENTMCNC: 28.8 PG — SIGNIFICANT CHANGE UP (ref 27–31)
MCHC RBC-ENTMCNC: 32 G/DL — SIGNIFICANT CHANGE UP (ref 32–37)
MCV RBC AUTO: 90.1 FL — SIGNIFICANT CHANGE UP (ref 81–99)
MONOCYTES # BLD AUTO: 0.32 K/UL — SIGNIFICANT CHANGE UP (ref 0.1–0.6)
MONOCYTES NFR BLD AUTO: 5.3 % — SIGNIFICANT CHANGE UP (ref 1.7–9.3)
NEUTROPHILS # BLD AUTO: 4.51 K/UL — SIGNIFICANT CHANGE UP (ref 1.4–6.5)
NEUTROPHILS NFR BLD AUTO: 74.1 % — SIGNIFICANT CHANGE UP (ref 42.2–75.2)
NRBC # BLD: 0 /100 WBCS — SIGNIFICANT CHANGE UP (ref 0–0)
PLATELET # BLD AUTO: 273 K/UL — SIGNIFICANT CHANGE UP (ref 130–400)
POTASSIUM SERPL-MCNC: 4.2 MMOL/L — SIGNIFICANT CHANGE UP (ref 3.5–5)
POTASSIUM SERPL-MCNC: 5 MMOL/L — SIGNIFICANT CHANGE UP (ref 3.5–5)
POTASSIUM SERPL-SCNC: 4.2 MMOL/L — SIGNIFICANT CHANGE UP (ref 3.5–5)
POTASSIUM SERPL-SCNC: 5 MMOL/L — SIGNIFICANT CHANGE UP (ref 3.5–5)
RBC # BLD: 5.35 M/UL — SIGNIFICANT CHANGE UP (ref 4.2–5.4)
RBC # FLD: 22.9 % — HIGH (ref 11.5–14.5)
SODIUM SERPL-SCNC: 134 MMOL/L — LOW (ref 135–146)
SODIUM SERPL-SCNC: 139 MMOL/L — SIGNIFICANT CHANGE UP (ref 135–146)
WBC # BLD: 6.09 K/UL — SIGNIFICANT CHANGE UP (ref 4.8–10.8)
WBC # FLD AUTO: 6.09 K/UL — SIGNIFICANT CHANGE UP (ref 4.8–10.8)

## 2022-02-09 PROCEDURE — 99233 SBSQ HOSP IP/OBS HIGH 50: CPT

## 2022-02-09 RX ORDER — MAGNESIUM SULFATE 500 MG/ML
2 VIAL (ML) INJECTION ONCE
Refills: 0 | Status: COMPLETED | OUTPATIENT
Start: 2022-02-09 | End: 2022-02-09

## 2022-02-09 RX ORDER — IPRATROPIUM/ALBUTEROL SULFATE 18-103MCG
3 AEROSOL WITH ADAPTER (GRAM) INHALATION EVERY 6 HOURS
Refills: 0 | Status: DISCONTINUED | OUTPATIENT
Start: 2022-02-09 | End: 2022-02-16

## 2022-02-09 RX ADMIN — Medication 145 MILLIGRAM(S): at 12:18

## 2022-02-09 RX ADMIN — PANTOPRAZOLE SODIUM 40 MILLIGRAM(S): 20 TABLET, DELAYED RELEASE ORAL at 05:58

## 2022-02-09 RX ADMIN — SPIRONOLACTONE 25 MILLIGRAM(S): 25 TABLET, FILM COATED ORAL at 05:58

## 2022-02-09 RX ADMIN — SACUBITRIL AND VALSARTAN 1 TABLET(S): 24; 26 TABLET, FILM COATED ORAL at 05:58

## 2022-02-09 RX ADMIN — Medication 1: at 08:12

## 2022-02-09 RX ADMIN — ENOXAPARIN SODIUM 40 MILLIGRAM(S): 100 INJECTION SUBCUTANEOUS at 12:18

## 2022-02-09 RX ADMIN — Medication 1: at 12:19

## 2022-02-09 RX ADMIN — Medication 81 MILLIGRAM(S): at 12:17

## 2022-02-09 RX ADMIN — SACUBITRIL AND VALSARTAN 1 TABLET(S): 24; 26 TABLET, FILM COATED ORAL at 17:06

## 2022-02-09 RX ADMIN — MAGNESIUM OXIDE 400 MG ORAL TABLET 400 MILLIGRAM(S): 241.3 TABLET ORAL at 12:17

## 2022-02-09 RX ADMIN — Medication 50 MILLIGRAM(S): at 05:58

## 2022-02-09 RX ADMIN — ATORVASTATIN CALCIUM 80 MILLIGRAM(S): 80 TABLET, FILM COATED ORAL at 22:29

## 2022-02-09 RX ADMIN — MAGNESIUM OXIDE 400 MG ORAL TABLET 400 MILLIGRAM(S): 241.3 TABLET ORAL at 08:11

## 2022-02-09 RX ADMIN — MAGNESIUM OXIDE 400 MG ORAL TABLET 400 MILLIGRAM(S): 241.3 TABLET ORAL at 17:06

## 2022-02-09 RX ADMIN — CLOPIDOGREL BISULFATE 75 MILLIGRAM(S): 75 TABLET, FILM COATED ORAL at 12:18

## 2022-02-09 RX ADMIN — BUDESONIDE AND FORMOTEROL FUMARATE DIHYDRATE 2 PUFF(S): 160; 4.5 AEROSOL RESPIRATORY (INHALATION) at 08:12

## 2022-02-09 RX ADMIN — BUDESONIDE AND FORMOTEROL FUMARATE DIHYDRATE 2 PUFF(S): 160; 4.5 AEROSOL RESPIRATORY (INHALATION) at 20:19

## 2022-02-09 RX ADMIN — QUETIAPINE FUMARATE 25 MILLIGRAM(S): 200 TABLET, FILM COATED ORAL at 22:29

## 2022-02-09 NOTE — PROGRESS NOTE ADULT - ATTENDING COMMENTS
Patient seen and examined independently. Agree with resident note/ history / physical exam and plan of care with following exceptions/additions/updates. Case discussed with patient/pt decision maker, house-staff and nursing. My notes supersedes the resident's notes in case of discrepancies.      # SOB, multifactorial, chf HFrEF acute on chronic plus COPD and CORNELIUS. hx of cva, cont cpap, cont diuresis per HF team   # hx of cva, has HHA at home,   # taiwo, monitor cr, avoid overdiuresis   creatinine trend  1.2 (02-09-22 @ 11:00)  1.3 (02-08-22 @ 22:10)  1.2 (02-08-22 @ 06:30)  0.9 (02-07-22 @ 06:32)    #Progress Note Handoff  Pending (specify): clinical imrpovement   Family discussion: natalie pt   Disposition: Home_ with HHA

## 2022-02-09 NOTE — PROGRESS NOTE ADULT - SUBJECTIVE AND OBJECTIVE BOX
Date of Admission: 22    Interval History:   - Patient resting in bed, 3L NC, NAD  - Stated she feels "winded" today, more so than yesterday   - Remains fluid overloaded on exam     HISTORY OF PRESENT ILLNESS: 55 YO F w/ PMHx of COPD (on 3L home O2), CORNELIUS on CPAP, HFrEF (15-20% on TTE 2021), severe pulmonary HTN, CAD s/p PCI to RCA, HTN/HLD, CVA with residual LLE weakness, T2DM, recently quit smoking (1 month ago) presented for shortness of breath that worsened last night. As per the patient she was noticing worsening LE swelling and increased SOB for the last few days. Last last she had chinese food and then her SOB worsened. Per EMS, patient was placed on NRB 10L and one albuterol treatment with improvement en route. Pt was recently admitted in 2021 for b/l lower extremity edema and was found to be COVID-19 positive at the time. Reports being vaccinated against COVID-19.   Denies fever/chills. No chest pain, palpitations, abd pain, N/V/D. Has cough with sputum at baseline, denies any change in cough or sputum. Pt wheelchair bound at baseline.  In the ED VS were sig for a HR of 121 bpm. She was placed on BiPAP and then transitioned to NC  CT angio showed mild-mod pericardial effusion. ED spoke with CT surgery and they will evaluate  (2022 16:33)    Patient well known to heart failure team from multiple previous admissions. Patient reports progressively worsening SOB and LE edema x3 days, exacerbated by non compliance with low Na diet (Chinese take out food) the night before admission. Patient also noticed abdominal bloating, decreased appetite, and pain in legs while walking right before admission as well. Denies chest pain, palpitations, n/v, cough, or abnormal bleeding.       PAST MEDICAL & SURGICAL HISTORY:  Hypertension  Hyperlipidemia  Anxiety and depression  COPD, severe  CHF (congestive heart failure)  Cerebrovascular accident (CVA)Multiple  Type 2 diabetes mellitus  CKD (chronic kidney disease), stage II  No significant past surgical history        FAMILY HISTORY:  No pertinent family history of premature cardiovascular disease in first degree relatives.  Mother:  of cancer at 65  Father:  of an overdose at 50    SOCIAL HISTORY:    Current every day smoker, 2 cigarettes a day, denies alcohol or drug use    Allergies  No Known Allergies    Intolerances      PHYSICAL EXAM:  General Appearance: well appearing, normal for age and gender. 	  Neck: unable to assess due to body habitus  Eyes: Extra Ocular muscles intact.   Cardiovascular: regular rate and rhythm S1 S2, , No murmurs,+2 B/L pedal edema  Respiratory: B/L anterior expiratory wheezing, posterior lung fields clear  Psychiatry: Alert and oriented x 3, Mood & affect appropriate  Gastrointestinal:  Soft, Non-tender  Skin/Integumentary : No rashes, No ecchymoses, No cyanosis	  Neurologic: Non-focal  Musculoskeletal/ extremities: Normal range of motion, No clubbing, cyanosis   Vascular: Peripheral pulses palpable 2+ bilaterally    CARDIAC MARKERS:  Serum Pro-Brain Natriuretic Peptide: 4892 pg/mL (22 @ 10:50)      TELEMETRY EVENTS: 	    EC/6/22      Ventricular Rate 116 BPM  Atrial Rate 116 BPM  P-R Interval 148 ms  QRS Duration 148 ms  Q-T Interval 346 ms  QTC Calculation(Bazett) 480 ms  R Axis 162 degrees  T Axis -11 degrees    Diagnosis Line Sinus tachycardia  Right axis deviation  Non-specific intra-ventricular conduction delay  Abnormal ECG    Confirmed by Lisa Combs MDfer ([id number]) on 2022 4:06:55 PM        PREVIOUS DIAGNOSTIC TESTING:      TTE 22    Summary:   1. LV Ejection Fraction by Urena's Method with a biplane EF of 19 %.   2. Severely decreased global left ventricular systolic function.   3. Dilated RV with moderately reduced systolic function.   4. Moderately enlarged left atrium.   5. Severely enlarged right atrium.   6. Sclerotic aorticvalve with normal opening.   7. Mild aortic regurgitation.   8. The mitral valve leaflets are tethered due to reduced systolic   function and elevated LVDP.   9. Moderate-to-severe mitral regurgitation.  10. Severe tricuspid regurgitation.  11. Estimated pulmonary artery systolic pressure is 53.7 mmHg assuming a   right atrial pressure of 15 mmHg, which is consistent with moderate   pulmonary hypertension.  12. Small pericardial effusion adjacent to the right atrium.        TTE 21    Summary:   1. Left ventricular ejection fraction, by visual estimation, is 35 to 40%.   2.Moderately decreased global left ventricular systolic function.   3. Multiple left ventricular regional wall motion abnormalities exist. See wall motion findings.   4. Elevated left atrial and left ventricular end-diastolic pressures.   5. Mild concentric left ventricular hypertrophy.   6. Mildly increased LV wall thickness.   7. Normal left ventricular internal cavity size.   8. Spectral Doppler shows restrictive pattern of left ventricular myocardial filling (Grade III diastolic dysfunction).  9. Moderately reduced RV systolic function.  10. Mildly enlarged left atrium.  11. Moderately enlarged right atrium.  12. Small pericardial effusion.  13. Mild to moderate mitral valve regurgitation.  14. Moderate-severe tricuspid regurgitation.  15.Mild aortic regurgitation.  16. Sclerotic aortic valve with normal opening.  17. Estimated pulmonary artery systolic pressure is 50.0 mmHg assuming a right atrial pressure of 15 mmHg, which is consistent with moderate pulmonary hypertension.  18. Pulmonary hypertension is present.  19. LA volume Index is 36.7 ml/m² ml/m2.    	    Home Medications:  Albuterol (Eqv-ProAir HFA) 90 mcg/inh inhalation aerosol: 2 puff(s) inhaled every 6 hours, As Needed (2021 11:22)  aspirin 81 mg oral tablet: 1 tab(s) orally once a day (2021 11:22)  fenofibrate 145 mg oral tablet: 1 tab(s) orally once a day (2021 11:22)  glipiZIDE 10 mg oral tablet: 1 tab(s) orally 2 times a day (2021 11:22)  Lovaza 1000 mg oral capsule: 2 cap(s) orally 2 times a day (2021 14:52)  metFORMIN 1000 mg oral tablet: 1 tab(s) orally 2 times a day Do not take until  (2021 12:58)  Plavix 75 mg oral tablet: 1 tab(s) orally once a day (2021 11:22)  Vitamin D2 1.25 mg (50,000 intl units) oral capsule: 1 cap(s) orally once a week (2021 14:52)    MEDICATIONS  (STANDING):  aspirin  chewable 81 milliGRAM(s) Oral daily  atorvastatin 80 milliGRAM(s) Oral at bedtime  budesonide 160 MICROgram(s)/formoterol 4.5 MICROgram(s) Inhaler 2 Puff(s) Inhalation two times a day  chlorhexidine 4% Liquid 1 Application(s) Topical <User Schedule>  clopidogrel Tablet 75 milliGRAM(s) Oral daily  enoxaparin Injectable 40 milliGRAM(s) SubCutaneous daily  fenofibrate Tablet 145 milliGRAM(s) Oral daily  furosemide   Injectable 40 milliGRAM(s) IV Push two times a day  magnesium sulfate  IVPB 2 Gram(s) IV Intermittent once  metoprolol succinate ER 50 milliGRAM(s) Oral daily  omega-3-Acid Ethyl Esters 2 Gram(s) Oral two times a day  pantoprazole    Tablet 40 milliGRAM(s) Oral before breakfast  sacubitril 49 mG/valsartan 51 mG 1 Tablet(s) Oral two times a day    MEDICATIONS  (PRN):  ALBUTerol    90 MICROgram(s) HFA Inhaler 2 Puff(s) Inhalation every 6 hours PRN Shortness of Breath and/or Wheezing         Date of Admission: 22    Interval History:   - Patient resting in bed, 3L NC, NAD  - Stated she feels "winded" today, more so than yesterday       HISTORY OF PRESENT ILLNESS: 57 YO F w/ PMHx of COPD (on 3L home O2), CORNELIUS on CPAP, HFrEF (15-20% on TTE 2021), severe pulmonary HTN, CAD s/p PCI to RCA, HTN/HLD, CVA with residual LLE weakness, T2DM, recently quit smoking (1 month ago) presented for shortness of breath that worsened last night. As per the patient she was noticing worsening LE swelling and increased SOB for the last few days. Last last she had chinese food and then her SOB worsened. Per EMS, patient was placed on NRB 10L and one albuterol treatment with improvement en route. Pt was recently admitted in 2021 for b/l lower extremity edema and was found to be COVID-19 positive at the time. Reports being vaccinated against COVID-19.   Denies fever/chills. No chest pain, palpitations, abd pain, N/V/D. Has cough with sputum at baseline, denies any change in cough or sputum. Pt wheelchair bound at baseline.  In the ED VS were sig for a HR of 121 bpm. She was placed on BiPAP and then transitioned to NC  CT angio showed mild-mod pericardial effusion. ED spoke with CT surgery and they will evaluate  (2022 16:33)    Patient well known to heart failure team from multiple previous admissions. Patient reports progressively worsening SOB and LE edema x3 days, exacerbated by non compliance with low Na diet (Chinese take out food) the night before admission. Patient also noticed abdominal bloating, decreased appetite, and pain in legs while walking right before admission as well. Denies chest pain, palpitations, n/v, cough, or abnormal bleeding.       PAST MEDICAL & SURGICAL HISTORY:  Hypertension  Hyperlipidemia  Anxiety and depression  COPD, severe  CHF (congestive heart failure)  Cerebrovascular accident (CVA)Multiple  Type 2 diabetes mellitus  CKD (chronic kidney disease), stage II  No significant past surgical history        FAMILY HISTORY:  No pertinent family history of premature cardiovascular disease in first degree relatives.  Mother:  of cancer at 65  Father:  of an overdose at 50    SOCIAL HISTORY:    Current every day smoker, 2 cigarettes a day, denies alcohol or drug use    Allergies  No Known Allergies    Intolerances      PHYSICAL EXAM:  General Appearance: well appearing, normal for age and gender. 	  Neck: unable to assess due to body habitus  Eyes: Extra Ocular muscles intact.   Cardiovascular: regular rate and rhythm S1 S2, , No murmurs,+2 B/L pedal edema  Respiratory: B/L anterior expiratory wheezing, posterior lung fields clear  Psychiatry: Alert and oriented x 3, Mood & affect appropriate  Gastrointestinal:  Soft, Non-tender  Skin/Integumentary : No rashes, No ecchymoses, No cyanosis	  Neurologic: Non-focal  Musculoskeletal/ extremities: Normal range of motion, No clubbing, cyanosis   Vascular: Peripheral pulses palpable 2+ bilaterally    CARDIAC MARKERS:  Serum Pro-Brain Natriuretic Peptide: 4892 pg/mL (22 @ 10:50)      TELEMETRY EVENTS: 	    EC/6/22      Ventricular Rate 116 BPM  Atrial Rate 116 BPM  P-R Interval 148 ms  QRS Duration 148 ms  Q-T Interval 346 ms  QTC Calculation(Bazett) 480 ms  R Axis 162 degrees  T Axis -11 degrees    Diagnosis Line Sinus tachycardia  Right axis deviation  Non-specific intra-ventricular conduction delay  Abnormal ECG    Confirmed by Lisa Combs MDfer ([id number]) on 2022 4:06:55 PM        PREVIOUS DIAGNOSTIC TESTING:      TTE 22    Summary:   1. LV Ejection Fraction by Urena's Method with a biplane EF of 19 %.   2. Severely decreased global left ventricular systolic function.   3. Dilated RV with moderately reduced systolic function.   4. Moderately enlarged left atrium.   5. Severely enlarged right atrium.   6. Sclerotic aorticvalve with normal opening.   7. Mild aortic regurgitation.   8. The mitral valve leaflets are tethered due to reduced systolic   function and elevated LVDP.   9. Moderate-to-severe mitral regurgitation.  10. Severe tricuspid regurgitation.  11. Estimated pulmonary artery systolic pressure is 53.7 mmHg assuming a   right atrial pressure of 15 mmHg, which is consistent with moderate   pulmonary hypertension.  12. Small pericardial effusion adjacent to the right atrium.        TTE 21    Summary:   1. Left ventricular ejection fraction, by visual estimation, is 35 to 40%.   2.Moderately decreased global left ventricular systolic function.   3. Multiple left ventricular regional wall motion abnormalities exist. See wall motion findings.   4. Elevated left atrial and left ventricular end-diastolic pressures.   5. Mild concentric left ventricular hypertrophy.   6. Mildly increased LV wall thickness.   7. Normal left ventricular internal cavity size.   8. Spectral Doppler shows restrictive pattern of left ventricular myocardial filling (Grade III diastolic dysfunction).  9. Moderately reduced RV systolic function.  10. Mildly enlarged left atrium.  11. Moderately enlarged right atrium.  12. Small pericardial effusion.  13. Mild to moderate mitral valve regurgitation.  14. Moderate-severe tricuspid regurgitation.  15.Mild aortic regurgitation.  16. Sclerotic aortic valve with normal opening.  17. Estimated pulmonary artery systolic pressure is 50.0 mmHg assuming a right atrial pressure of 15 mmHg, which is consistent with moderate pulmonary hypertension.  18. Pulmonary hypertension is present.  19. LA volume Index is 36.7 ml/m² ml/m2.    	    Home Medications:  Albuterol (Eqv-ProAir HFA) 90 mcg/inh inhalation aerosol: 2 puff(s) inhaled every 6 hours, As Needed (2021 11:22)  aspirin 81 mg oral tablet: 1 tab(s) orally once a day (2021 11:22)  fenofibrate 145 mg oral tablet: 1 tab(s) orally once a day (2021 11:22)  glipiZIDE 10 mg oral tablet: 1 tab(s) orally 2 times a day (2021 11:22)  Lovaza 1000 mg oral capsule: 2 cap(s) orally 2 times a day (2021 14:52)  metFORMIN 1000 mg oral tablet: 1 tab(s) orally 2 times a day Do not take until  (2021 12:58)  Plavix 75 mg oral tablet: 1 tab(s) orally once a day (2021 11:22)  Vitamin D2 1.25 mg (50,000 intl units) oral capsule: 1 cap(s) orally once a week (2021 14:52)    MEDICATIONS  (STANDING):  aspirin  chewable 81 milliGRAM(s) Oral daily  atorvastatin 80 milliGRAM(s) Oral at bedtime  budesonide 160 MICROgram(s)/formoterol 4.5 MICROgram(s) Inhaler 2 Puff(s) Inhalation two times a day  chlorhexidine 4% Liquid 1 Application(s) Topical <User Schedule>  clopidogrel Tablet 75 milliGRAM(s) Oral daily  enoxaparin Injectable 40 milliGRAM(s) SubCutaneous daily  fenofibrate Tablet 145 milliGRAM(s) Oral daily  furosemide   Injectable 40 milliGRAM(s) IV Push two times a day  magnesium sulfate  IVPB 2 Gram(s) IV Intermittent once  metoprolol succinate ER 50 milliGRAM(s) Oral daily  omega-3-Acid Ethyl Esters 2 Gram(s) Oral two times a day  pantoprazole    Tablet 40 milliGRAM(s) Oral before breakfast  sacubitril 49 mG/valsartan 51 mG 1 Tablet(s) Oral two times a day    MEDICATIONS  (PRN):  ALBUTerol    90 MICROgram(s) HFA Inhaler 2 Puff(s) Inhalation every 6 hours PRN Shortness of Breath and/or Wheezing

## 2022-02-09 NOTE — PROGRESS NOTE ADULT - ASSESSMENT
57 YO F w/ PMHx of COPD (on 3L home O2), CORNELIUS on CPAP, HFrEF (15-20% on TTE 11/2021), severe pulmonary HTN, CAD s/p PCI to RCA, HTN/HLD, CVA with residual LLE weakness, T2DM, recently quit smoking (1 month ago) presented for shortness of breath that worsened last night.     #Acute CHF exacerbation  #Hx of HFrEF; BNP around baseline   #Hx of COPD on 3L home O2  #Hx of severe pul-HTN  #Mild-mod pericardial effusion; low probability of tamponade    - CT Angio Chest PE Protocol w/ IV Cont: No PE. Cardiomegaly with asymmetric right heart dilatation. Mild/moderate pericardial effusion, slightly increased since prior. Small bilateral pleural effusions, slightly increased since prior.   - TTE 11/14/2021: EF 15-20%, G2DD, mod MR, mild-mod TR, severe P-HTN, small pericardial effusion.  - Echo 02/06: 19% EF, small pericardial effusion, moderate p-HTN, mod-severe MR  - Troponin 0.02; around baseline.  - Daily weight. Strict I & Os. Keep I < O. Fluid restriction  - Continue Metoprolol 50mg PO QD, Entresto 49-51 BID and spironolactone 25mg PO QD  - No intervention per CTS  - d/c lasix  - start bumex gtt on 2/8, BMP BID    #CAD s/p PCI to RCA - Continue ASA 81mg PO QD, Plavix 75mg PO QD, Metoprolol 50mg PO QD    #CORNELIUS on CPAP - CPAP at night     #Hx of CVA with residual weakness   #HTN/DLD  - Continue atorvastatin 80mg PO QD, ASA 81mg PO QD, Plavix 75mg PO QD, fenofibrate 145mg PO QD    #Diabetes Mellitus type II   - Last HbA1C 7.7 % (11-10-21)  - Will hold home medications  - Monitor FS. Start insulin if FS > 180. Keep between 140 - 180. Carb consistent diet    #Misc  - DVT Prophylaxis: Lovenox  - Diet: DASH/TLC, CC, fluid restriction  - GI Prophylaxis: pantoprazole   - Activity: AAT  - Code Status: Full Code  Dispo acute

## 2022-02-09 NOTE — PROGRESS NOTE ADULT - SUBJECTIVE AND OBJECTIVE BOX
SUBJECTIVE:    Patient is a 56y old Female who presents with a chief complaint of Shortness of breath (08 Feb 2022 10:20)    Currently admitted to medicine with the primary diagnosis of Pericardial effusion       Today is hospital day 3d. This morning she is resting comfortably in bed and reports no new issues or overnight events.     PAST MEDICAL & SURGICAL HISTORY  Hypertension    Hyperlipidemia    Anxiety and depression    COPD, severe    CHF (congestive heart failure)    Cerebrovascular accident (CVA)  Multiple    Type 2 diabetes mellitus    CKD (chronic kidney disease), stage II    No significant past surgical history      SOCIAL HISTORY:  Negative for smoking/alcohol/drug use.     ALLERGIES:  No Known Allergies    MEDICATIONS:  STANDING MEDICATIONS  aspirin enteric coated 81 milliGRAM(s) Oral daily  atorvastatin 80 milliGRAM(s) Oral at bedtime  budesonide 160 MICROgram(s)/formoterol 4.5 MICROgram(s) Inhaler 2 Puff(s) Inhalation two times a day  buMETAnide Infusion 1 mG/Hr IV Continuous <Continuous>  clopidogrel Tablet 75 milliGRAM(s) Oral daily  enoxaparin Injectable 40 milliGRAM(s) SubCutaneous daily  fenofibrate Tablet 145 milliGRAM(s) Oral daily  influenza   Vaccine 0.5 milliLiter(s) IntraMuscular once  insulin lispro (ADMELOG) corrective regimen sliding scale   SubCutaneous Before meals and at bedtime  magnesium oxide 400 milliGRAM(s) Oral three times a day with meals  metoprolol succinate ER 50 milliGRAM(s) Oral daily  pantoprazole    Tablet 40 milliGRAM(s) Oral before breakfast  QUEtiapine 25 milliGRAM(s) Oral at bedtime  sacubitril 49 mG/valsartan 51 mG 1 Tablet(s) Oral two times a day  spironolactone 25 milliGRAM(s) Oral daily    PRN MEDICATIONS  ALBUTerol    90 MICROgram(s) HFA Inhaler 2 Puff(s) Inhalation every 6 hours PRN    VITALS:   T(F): 96  HR: 94  BP: 131/86  RR: 18  SpO2: 96%    LABS:                        14.1   6.30  )-----------( 283      ( 08 Feb 2022 06:30 )             44.4     02-08    137  |  97<L>  |  22<H>  ----------------------------<  135<H>  4.1   |  25  |  1.3    Ca    9.0      08 Feb 2022 22:10  Mg     1.8     02-08                    RADIOLOGY:    PHYSICAL EXAM:  GEN: No acute distress  LUNGS: Clear to auscultation bilaterally   HEART: Regular  ABD: Soft, non-tender, non-distended.  EXT: NC/NC/NE/2+PP/ROLDAN/Skin Intact.   NEURO: AAOX3    Intravenous access:   NG tube:   Wilkerson Catheter:        SUBJECTIVE:    Patient is a 56y old Female who presents with a chief complaint of Shortness of breath (08 Feb 2022 10:20)    Currently admitted to medicine with the primary diagnosis of Pericardial effusion    Today is hospital day 3d. This morning she is resting comfortably in bed and reports no new issues or overnight events.     ROS no cp, still has sob     PAST MEDICAL & SURGICAL HISTORY  Hypertension    Hyperlipidemia    Anxiety and depression    COPD, severe    CHF (congestive heart failure)    Cerebrovascular accident (CVA)  Multiple    Type 2 diabetes mellitus    CKD (chronic kidney disease), stage II    No significant past surgical history      SOCIAL HISTORY:  Negative for smoking/alcohol/drug use.     ALLERGIES:  No Known Allergies    MEDICATIONS:  STANDING MEDICATIONS  aspirin enteric coated 81 milliGRAM(s) Oral daily  atorvastatin 80 milliGRAM(s) Oral at bedtime  budesonide 160 MICROgram(s)/formoterol 4.5 MICROgram(s) Inhaler 2 Puff(s) Inhalation two times a day  buMETAnide Infusion 1 mG/Hr IV Continuous <Continuous>  clopidogrel Tablet 75 milliGRAM(s) Oral daily  enoxaparin Injectable 40 milliGRAM(s) SubCutaneous daily  fenofibrate Tablet 145 milliGRAM(s) Oral daily  influenza   Vaccine 0.5 milliLiter(s) IntraMuscular once  insulin lispro (ADMELOG) corrective regimen sliding scale   SubCutaneous Before meals and at bedtime  magnesium oxide 400 milliGRAM(s) Oral three times a day with meals  metoprolol succinate ER 50 milliGRAM(s) Oral daily  pantoprazole    Tablet 40 milliGRAM(s) Oral before breakfast  QUEtiapine 25 milliGRAM(s) Oral at bedtime  sacubitril 49 mG/valsartan 51 mG 1 Tablet(s) Oral two times a day  spironolactone 25 milliGRAM(s) Oral daily    PRN MEDICATIONS  ALBUTerol    90 MICROgram(s) HFA Inhaler 2 Puff(s) Inhalation every 6 hours PRN    VITALS:   T(F): 96  HR: 94  BP: 131/86  RR: 18  SpO2: 96%    LABS:                        14.1   6.30  )-----------( 283      ( 08 Feb 2022 06:30 )             44.4     02-08    137  |  97<L>  |  22<H>  ----------------------------<  135<H>  4.1   |  25  |  1.3    Ca    9.0      08 Feb 2022 22:10  Mg     1.8     02-08                    RADIOLOGY:    PHYSICAL EXAM:  GEN: No acute distress  LUNGS: Clear to auscultation bilaterally   HEART: Regular  ABD: Soft, non-tender, non-distended.  EXT: NC/NC/NE/2+PP/ROLDAN/Skin Intact.   NEURO: AAOX3    Intravenous access:   NG tube:   Wilkerson Catheter:

## 2022-02-09 NOTE — PROGRESS NOTE ADULT - ASSESSMENT
Acute on chronic systolic HF / Fluid overload / pulmonary HTN / COPD / DM2 / Anxiety     Patient remains fluid overloaded on exam- POCUS exam: IVC 2.7cm, non collapsing   Continue Bumex gtt at 1 mg/hr  Continue Entresto 49-51 mg BID   Continue metoprolol XL 50 mg daily   Continue Spironolactone 25 mg daily   Get BMP twice daily with magnesium   Maintain potassium >4.0, Mg >2.1  Strict intake and output  Daily weight   Plan discussed with primary team  Will continue to follow   Acute on chronic systolic HF / Fluid overload / pulmonary HTN / COPD / DM2 / Anxiety     Patient remains fluid overloaded on exam- POCUS exam: IVC 2.7cm, non collapsing   Continue Bumex gtt at 1 mg/hr  Continue Entresto 49-51 mg BID   Continue metoprolol XL 50 mg daily   Continue Spironolactone 25 mg daily   Get BMP twice daily with magnesium   Maintain potassium >4.0, Mg >2.1  Strict intake and output  Daily weight   Plan discussed with primary team  Will continue to follow.

## 2022-02-10 LAB
ALBUMIN SERPL ELPH-MCNC: 3.5 G/DL — SIGNIFICANT CHANGE UP (ref 3.5–5.2)
ALP SERPL-CCNC: 131 U/L — HIGH (ref 30–115)
ALT FLD-CCNC: 11 U/L — SIGNIFICANT CHANGE UP (ref 0–41)
ANION GAP SERPL CALC-SCNC: 10 MMOL/L — SIGNIFICANT CHANGE UP (ref 7–14)
ANION GAP SERPL CALC-SCNC: 14 MMOL/L — SIGNIFICANT CHANGE UP (ref 7–14)
AST SERPL-CCNC: 23 U/L — SIGNIFICANT CHANGE UP (ref 0–41)
BASOPHILS # BLD AUTO: 0.1 K/UL — SIGNIFICANT CHANGE UP (ref 0–0.2)
BASOPHILS NFR BLD AUTO: 2 % — HIGH (ref 0–1)
BILIRUB SERPL-MCNC: 1.7 MG/DL — HIGH (ref 0.2–1.2)
BUN SERPL-MCNC: 17 MG/DL — SIGNIFICANT CHANGE UP (ref 10–20)
BUN SERPL-MCNC: 18 MG/DL — SIGNIFICANT CHANGE UP (ref 10–20)
C DIFF BY PCR RESULT: NEGATIVE — SIGNIFICANT CHANGE UP
CALCIUM SERPL-MCNC: 8.6 MG/DL — SIGNIFICANT CHANGE UP (ref 8.5–10.1)
CALCIUM SERPL-MCNC: 8.8 MG/DL — SIGNIFICANT CHANGE UP (ref 8.5–10.1)
CHLORIDE SERPL-SCNC: 93 MMOL/L — LOW (ref 98–110)
CHLORIDE SERPL-SCNC: 94 MMOL/L — LOW (ref 98–110)
CK MB CFR SERPL CALC: 1.4 NG/ML — SIGNIFICANT CHANGE UP (ref 0.6–6.3)
CO2 SERPL-SCNC: 31 MMOL/L — SIGNIFICANT CHANGE UP (ref 17–32)
CO2 SERPL-SCNC: 37 MMOL/L — HIGH (ref 17–32)
CREAT SERPL-MCNC: 1.1 MG/DL — SIGNIFICANT CHANGE UP (ref 0.7–1.5)
CREAT SERPL-MCNC: 1.2 MG/DL — SIGNIFICANT CHANGE UP (ref 0.7–1.5)
EOSINOPHIL # BLD AUTO: 0.13 K/UL — SIGNIFICANT CHANGE UP (ref 0–0.7)
EOSINOPHIL NFR BLD AUTO: 2.6 % — SIGNIFICANT CHANGE UP (ref 0–8)
GLUCOSE BLDC GLUCOMTR-MCNC: 145 MG/DL — HIGH (ref 70–99)
GLUCOSE BLDC GLUCOMTR-MCNC: 147 MG/DL — HIGH (ref 70–99)
GLUCOSE BLDC GLUCOMTR-MCNC: 167 MG/DL — HIGH (ref 70–99)
GLUCOSE BLDC GLUCOMTR-MCNC: 169 MG/DL — HIGH (ref 70–99)
GLUCOSE BLDC GLUCOMTR-MCNC: 178 MG/DL — HIGH (ref 70–99)
GLUCOSE BLDC GLUCOMTR-MCNC: 199 MG/DL — HIGH (ref 70–99)
GLUCOSE SERPL-MCNC: 124 MG/DL — HIGH (ref 70–99)
GLUCOSE SERPL-MCNC: 157 MG/DL — HIGH (ref 70–99)
HCT VFR BLD CALC: 45.5 % — SIGNIFICANT CHANGE UP (ref 37–47)
HGB BLD-MCNC: 14.1 G/DL — SIGNIFICANT CHANGE UP (ref 12–16)
IMM GRANULOCYTES NFR BLD AUTO: 0.4 % — HIGH (ref 0.1–0.3)
LYMPHOCYTES # BLD AUTO: 1.5 K/UL — SIGNIFICANT CHANGE UP (ref 1.2–3.4)
LYMPHOCYTES # BLD AUTO: 30.5 % — SIGNIFICANT CHANGE UP (ref 20.5–51.1)
MAGNESIUM SERPL-MCNC: 1.4 MG/DL — LOW (ref 1.8–2.4)
MCHC RBC-ENTMCNC: 28.1 PG — SIGNIFICANT CHANGE UP (ref 27–31)
MCHC RBC-ENTMCNC: 31 G/DL — LOW (ref 32–37)
MCV RBC AUTO: 90.6 FL — SIGNIFICANT CHANGE UP (ref 81–99)
MONOCYTES # BLD AUTO: 0.34 K/UL — SIGNIFICANT CHANGE UP (ref 0.1–0.6)
MONOCYTES NFR BLD AUTO: 6.9 % — SIGNIFICANT CHANGE UP (ref 1.7–9.3)
NEUTROPHILS # BLD AUTO: 2.82 K/UL — SIGNIFICANT CHANGE UP (ref 1.4–6.5)
NEUTROPHILS NFR BLD AUTO: 57.6 % — SIGNIFICANT CHANGE UP (ref 42.2–75.2)
NRBC # BLD: 0 /100 WBCS — SIGNIFICANT CHANGE UP (ref 0–0)
PLATELET # BLD AUTO: 280 K/UL — SIGNIFICANT CHANGE UP (ref 130–400)
POTASSIUM SERPL-MCNC: 3.6 MMOL/L — SIGNIFICANT CHANGE UP (ref 3.5–5)
POTASSIUM SERPL-MCNC: 4.3 MMOL/L — SIGNIFICANT CHANGE UP (ref 3.5–5)
POTASSIUM SERPL-SCNC: 3.6 MMOL/L — SIGNIFICANT CHANGE UP (ref 3.5–5)
POTASSIUM SERPL-SCNC: 4.3 MMOL/L — SIGNIFICANT CHANGE UP (ref 3.5–5)
PROT SERPL-MCNC: 6.7 G/DL — SIGNIFICANT CHANGE UP (ref 6–8)
RBC # BLD: 5.02 M/UL — SIGNIFICANT CHANGE UP (ref 4.2–5.4)
RBC # FLD: 22.5 % — HIGH (ref 11.5–14.5)
SODIUM SERPL-SCNC: 139 MMOL/L — SIGNIFICANT CHANGE UP (ref 135–146)
SODIUM SERPL-SCNC: 140 MMOL/L — SIGNIFICANT CHANGE UP (ref 135–146)
TROPONIN T SERPL-MCNC: <0.01 NG/ML — SIGNIFICANT CHANGE UP
WBC # BLD: 4.91 K/UL — SIGNIFICANT CHANGE UP (ref 4.8–10.8)
WBC # FLD AUTO: 4.91 K/UL — SIGNIFICANT CHANGE UP (ref 4.8–10.8)

## 2022-02-10 PROCEDURE — 99233 SBSQ HOSP IP/OBS HIGH 50: CPT

## 2022-02-10 PROCEDURE — 93925 LOWER EXTREMITY STUDY: CPT | Mod: 26

## 2022-02-10 RX ORDER — MAGNESIUM SULFATE 500 MG/ML
2 VIAL (ML) INJECTION
Refills: 0 | Status: DISCONTINUED | OUTPATIENT
Start: 2022-02-10 | End: 2022-02-10

## 2022-02-10 RX ORDER — LOPERAMIDE HCL 2 MG
2 TABLET ORAL THREE TIMES A DAY
Refills: 0 | Status: DISCONTINUED | OUTPATIENT
Start: 2022-02-10 | End: 2022-02-16

## 2022-02-10 RX ORDER — POTASSIUM CHLORIDE 20 MEQ
40 PACKET (EA) ORAL ONCE
Refills: 0 | Status: COMPLETED | OUTPATIENT
Start: 2022-02-10 | End: 2022-02-10

## 2022-02-10 RX ADMIN — Medication 1: at 11:47

## 2022-02-10 RX ADMIN — QUETIAPINE FUMARATE 25 MILLIGRAM(S): 200 TABLET, FILM COATED ORAL at 22:40

## 2022-02-10 RX ADMIN — SACUBITRIL AND VALSARTAN 1 TABLET(S): 24; 26 TABLET, FILM COATED ORAL at 06:20

## 2022-02-10 RX ADMIN — Medication 145 MILLIGRAM(S): at 11:03

## 2022-02-10 RX ADMIN — ATORVASTATIN CALCIUM 80 MILLIGRAM(S): 80 TABLET, FILM COATED ORAL at 22:40

## 2022-02-10 RX ADMIN — MAGNESIUM OXIDE 400 MG ORAL TABLET 400 MILLIGRAM(S): 241.3 TABLET ORAL at 17:09

## 2022-02-10 RX ADMIN — MAGNESIUM OXIDE 400 MG ORAL TABLET 400 MILLIGRAM(S): 241.3 TABLET ORAL at 11:04

## 2022-02-10 RX ADMIN — Medication 50 MILLIGRAM(S): at 06:21

## 2022-02-10 RX ADMIN — ENOXAPARIN SODIUM 40 MILLIGRAM(S): 100 INJECTION SUBCUTANEOUS at 11:02

## 2022-02-10 RX ADMIN — BUDESONIDE AND FORMOTEROL FUMARATE DIHYDRATE 2 PUFF(S): 160; 4.5 AEROSOL RESPIRATORY (INHALATION) at 20:16

## 2022-02-10 RX ADMIN — Medication 40 MILLIEQUIVALENT(S): at 11:01

## 2022-02-10 RX ADMIN — MAGNESIUM OXIDE 400 MG ORAL TABLET 400 MILLIGRAM(S): 241.3 TABLET ORAL at 08:39

## 2022-02-10 RX ADMIN — Medication 1: at 22:39

## 2022-02-10 RX ADMIN — SACUBITRIL AND VALSARTAN 1 TABLET(S): 24; 26 TABLET, FILM COATED ORAL at 17:09

## 2022-02-10 RX ADMIN — Medication 81 MILLIGRAM(S): at 11:03

## 2022-02-10 RX ADMIN — PANTOPRAZOLE SODIUM 40 MILLIGRAM(S): 20 TABLET, DELAYED RELEASE ORAL at 06:21

## 2022-02-10 RX ADMIN — SPIRONOLACTONE 25 MILLIGRAM(S): 25 TABLET, FILM COATED ORAL at 06:20

## 2022-02-10 RX ADMIN — CLOPIDOGREL BISULFATE 75 MILLIGRAM(S): 75 TABLET, FILM COATED ORAL at 11:03

## 2022-02-10 NOTE — PROGRESS NOTE ADULT - SUBJECTIVE AND OBJECTIVE BOX
SUBJECTIVE:    Patient is a 56y old Female who presents with a chief complaint of Shortness of breath (09 Feb 2022 13:30)    Currently admitted to medicine with the primary diagnosis of Pericardial effusion       Today is hospital day 4d. This morning she is resting comfortably in bed and reports no new issues or overnight events.     PAST MEDICAL & SURGICAL HISTORY  Hypertension    Hyperlipidemia    Anxiety and depression    COPD, severe    CHF (congestive heart failure)    Cerebrovascular accident (CVA)  Multiple    Type 2 diabetes mellitus    CKD (chronic kidney disease), stage II    No significant past surgical history      SOCIAL HISTORY:  Negative for smoking/alcohol/drug use.     ALLERGIES:  No Known Allergies    MEDICATIONS:  STANDING MEDICATIONS  aspirin enteric coated 81 milliGRAM(s) Oral daily  atorvastatin 80 milliGRAM(s) Oral at bedtime  budesonide 160 MICROgram(s)/formoterol 4.5 MICROgram(s) Inhaler 2 Puff(s) Inhalation two times a day  buMETAnide Infusion 1 mG/Hr IV Continuous <Continuous>  clopidogrel Tablet 75 milliGRAM(s) Oral daily  enoxaparin Injectable 40 milliGRAM(s) SubCutaneous daily  fenofibrate Tablet 145 milliGRAM(s) Oral daily  influenza   Vaccine 0.5 milliLiter(s) IntraMuscular once  insulin lispro (ADMELOG) corrective regimen sliding scale   SubCutaneous Before meals and at bedtime  magnesium oxide 400 milliGRAM(s) Oral three times a day with meals  metoprolol succinate ER 50 milliGRAM(s) Oral daily  pantoprazole    Tablet 40 milliGRAM(s) Oral before breakfast  potassium chloride   Powder 40 milliEquivalent(s) Oral once  QUEtiapine 25 milliGRAM(s) Oral at bedtime  sacubitril 49 mG/valsartan 51 mG 1 Tablet(s) Oral two times a day  spironolactone 25 milliGRAM(s) Oral daily    PRN MEDICATIONS  ALBUTerol    90 MICROgram(s) HFA Inhaler 2 Puff(s) Inhalation every 6 hours PRN  albuterol/ipratropium for Nebulization 3 milliLiter(s) Nebulizer every 6 hours PRN    VITALS:   T(F): 98  HR: 92  BP: 106/68  RR: 18  SpO2: 95%    LABS:                        14.1   4.91  )-----------( 280      ( 10 Feb 2022 06:14 )             45.5     02-10    140  |  93<L>  |  18  ----------------------------<  124<H>  3.6   |  37<H>  |  1.1    Ca    8.8      10 Feb 2022 06:14  Mg     1.4     02-10    TPro  6.7  /  Alb  3.5  /  TBili  1.7<H>  /  DBili  x   /  AST  23  /  ALT  11  /  AlkPhos  131<H>  02-10          Troponin T, Serum: <0.01 ng/mL (02-10-22 @ 06:14)      CARDIAC MARKERS ( 10 Feb 2022 06:14 )  x     / <0.01 ng/mL / x     / x     / 1.4 ng/mL      RADIOLOGY:    PHYSICAL EXAM:  GEN: No acute distress  LUNGS: Clear to auscultation bilaterally   HEART: Regular  ABD: Soft, non-tender, non-distended.  EXT: NC/NC/NE/2+PP/ROLDAN/Skin Intact.   NEURO: AAOX3    Intravenous access:   NG tube:   Wilkerson Catheter:        SUBJECTIVE:    Patient is a 56y old Female who presents with a chief complaint of Shortness of breath (09 Feb 2022 13:30)    Currently admitted to medicine with the primary diagnosis of Pericardial effusion    Today is hospital day 4d. This morning she is resting comfortably in bed and reports no new issues or overnight events.     ROS no cp, no sob , feels better   laying     PAST MEDICAL & SURGICAL HISTORY  Hypertension    Hyperlipidemia    Anxiety and depression    COPD, severe    CHF (congestive heart failure)    Cerebrovascular accident (CVA)  Multiple    Type 2 diabetes mellitus    CKD (chronic kidney disease), stage II    No significant past surgical history      SOCIAL HISTORY:  Negative for smoking/alcohol/drug use.     ALLERGIES:  No Known Allergies    MEDICATIONS:  STANDING MEDICATIONS  aspirin enteric coated 81 milliGRAM(s) Oral daily  atorvastatin 80 milliGRAM(s) Oral at bedtime  budesonide 160 MICROgram(s)/formoterol 4.5 MICROgram(s) Inhaler 2 Puff(s) Inhalation two times a day  buMETAnide Infusion 1 mG/Hr IV Continuous <Continuous>  clopidogrel Tablet 75 milliGRAM(s) Oral daily  enoxaparin Injectable 40 milliGRAM(s) SubCutaneous daily  fenofibrate Tablet 145 milliGRAM(s) Oral daily  influenza   Vaccine 0.5 milliLiter(s) IntraMuscular once  insulin lispro (ADMELOG) corrective regimen sliding scale   SubCutaneous Before meals and at bedtime  magnesium oxide 400 milliGRAM(s) Oral three times a day with meals  metoprolol succinate ER 50 milliGRAM(s) Oral daily  pantoprazole    Tablet 40 milliGRAM(s) Oral before breakfast  potassium chloride   Powder 40 milliEquivalent(s) Oral once  QUEtiapine 25 milliGRAM(s) Oral at bedtime  sacubitril 49 mG/valsartan 51 mG 1 Tablet(s) Oral two times a day  spironolactone 25 milliGRAM(s) Oral daily    PRN MEDICATIONS  ALBUTerol    90 MICROgram(s) HFA Inhaler 2 Puff(s) Inhalation every 6 hours PRN  albuterol/ipratropium for Nebulization 3 milliLiter(s) Nebulizer every 6 hours PRN    VITALS:   T(F): 98  HR: 92  BP: 106/68  RR: 18  SpO2: 95%    LABS:                        14.1   4.91  )-----------( 280      ( 10 Feb 2022 06:14 )             45.5     02-10    140  |  93<L>  |  18  ----------------------------<  124<H>  3.6   |  37<H>  |  1.1    Ca    8.8      10 Feb 2022 06:14  Mg     1.4     02-10    TPro  6.7  /  Alb  3.5  /  TBili  1.7<H>  /  DBili  x   /  AST  23  /  ALT  11  /  AlkPhos  131<H>  02-10          Troponin T, Serum: <0.01 ng/mL (02-10-22 @ 06:14)      CARDIAC MARKERS ( 10 Feb 2022 06:14 )  x     / <0.01 ng/mL / x     / x     / 1.4 ng/mL      RADIOLOGY:    PHYSICAL EXAM:  GEN: No acute distress  LUNGS: Clear to auscultation bilaterally   HEART: Regular  ABD: Soft, non-tender, non-distended.  EXT: NC/NC/NE/2+PP/ROLDAN/Skin Intact.   NEURO: AAOX3    Intravenous access:   NG tube:   Wilkerson Catheter:        SUBJECTIVE:    Patient is a 56y old Female who presents with a chief complaint of Shortness of breath (09 Feb 2022 13:30)    Currently admitted to medicine with the primary diagnosis of Pericardial effusion    Today is hospital day 4d. This morning she is resting comfortably in bed and reports bilat toe pain     ROS no cp, no sob , feels better   laying flat, no dyspnea, main complaint is foot pain     PAST MEDICAL & SURGICAL HISTORY  Hypertension    Hyperlipidemia    Anxiety and depression    COPD, severe    CHF (congestive heart failure)    Cerebrovascular accident (CVA)  Multiple    Type 2 diabetes mellitus    CKD (chronic kidney disease), stage II    No significant past surgical history      SOCIAL HISTORY:  Negative for smoking/alcohol/drug use.     ALLERGIES:  No Known Allergies    MEDICATIONS:  STANDING MEDICATIONS  aspirin enteric coated 81 milliGRAM(s) Oral daily  atorvastatin 80 milliGRAM(s) Oral at bedtime  budesonide 160 MICROgram(s)/formoterol 4.5 MICROgram(s) Inhaler 2 Puff(s) Inhalation two times a day  buMETAnide Infusion 1 mG/Hr IV Continuous <Continuous>  clopidogrel Tablet 75 milliGRAM(s) Oral daily  enoxaparin Injectable 40 milliGRAM(s) SubCutaneous daily  fenofibrate Tablet 145 milliGRAM(s) Oral daily  influenza   Vaccine 0.5 milliLiter(s) IntraMuscular once  insulin lispro (ADMELOG) corrective regimen sliding scale   SubCutaneous Before meals and at bedtime  magnesium oxide 400 milliGRAM(s) Oral three times a day with meals  metoprolol succinate ER 50 milliGRAM(s) Oral daily  pantoprazole    Tablet 40 milliGRAM(s) Oral before breakfast  potassium chloride   Powder 40 milliEquivalent(s) Oral once  QUEtiapine 25 milliGRAM(s) Oral at bedtime  sacubitril 49 mG/valsartan 51 mG 1 Tablet(s) Oral two times a day  spironolactone 25 milliGRAM(s) Oral daily    PRN MEDICATIONS  ALBUTerol    90 MICROgram(s) HFA Inhaler 2 Puff(s) Inhalation every 6 hours PRN  albuterol/ipratropium for Nebulization 3 milliLiter(s) Nebulizer every 6 hours PRN    VITALS:   T(F): 98.2 (02-10-22 @ 14:05), Max: 98.2 (02-10-22 @ 14:05)  HR: 77 (02-10-22 @ 14:05) (77 - 98)  BP: 129/76 (02-10-22 @ 14:05) (106/68 - 129/76)  RR: 18 (02-10-22 @ 14:05) (18 - 18)    LABS:                        14.1   4.91  )-----------( 280      ( 10 Feb 2022 06:14 )             45.5     02-10    140  |  93<L>  |  18  ----------------------------<  124<H>  3.6   |  37<H>  |  1.1    Ca    8.8      10 Feb 2022 06:14  Mg     1.4     02-10    TPro  6.7  /  Alb  3.5  /  TBili  1.7<H>  /  DBili  x   /  AST  23  /  ALT  11  /  AlkPhos  131<H>  02-10    Troponin T, Serum: <0.01 ng/mL (02-10-22 @ 06:14)    CARDIAC MARKERS ( 10 Feb 2022 06:14 )  x     / <0.01 ng/mL / x     / x     / 1.4 ng/mL      RADIOLOGY:    PHYSICAL EXAM:  GEN: No acute distress  LUNGS: Clear to auscultation bilaterally   HEART: Regular  ABD: Soft, non-tender, non-distended.  EXT: NC/NC/NE, ROLDAN/Skin Intact. very tender toes, decreased pulses lower ext   NEURO: AAOX3

## 2022-02-10 NOTE — PROGRESS NOTE ADULT - ASSESSMENT
57 YO F w/ PMHx of COPD (on 3L home O2), CORNELIUS on CPAP, HFrEF (15-20% on TTE 11/2021), severe pulmonary HTN, CAD s/p PCI to RCA, HTN/HLD, CVA with residual LLE weakness, T2DM, recently quit smoking (1 month ago) presented for shortness of breath that worsened last night.     #Acute CHF exacerbation  #Hx of HFrEF; BNP around baseline   #Hx of COPD on 3L home O2  #Hx of severe pul-HTN  #Mild-mod pericardial effusion; low probability of tamponade    - CT Angio Chest PE Protocol w/ IV Cont: No PE. Cardiomegaly with asymmetric right heart dilatation. Mild/moderate pericardial effusion, slightly increased since prior. Small bilateral pleural effusions, slightly increased since prior.   - TTE 11/14/2021: EF 15-20%, G2DD, mod MR, mild-mod TR, severe P-HTN, small pericardial effusion.  - Echo 02/06: 19% EF, small pericardial effusion, moderate p-HTN, mod-severe MR  - Troponin 0.02; around baseline.  - Daily weight. Strict I & Os. Keep I < O. Fluid restriction  - Continue Metoprolol 50mg PO QD, Entresto 49-51 BID and spironolactone 25mg PO QD  - No intervention per CTS  - d/c lasix  - start bumex gtt on 2/8, BMP BID    #CAD s/p PCI to RCA - Continue ASA 81mg PO QD, Plavix 75mg PO QD, Metoprolol 50mg PO QD    #CORNELIUS on CPAP - CPAP at night     #Hx of CVA with residual weakness   #HTN/DLD  - Continue atorvastatin 80mg PO QD, ASA 81mg PO QD, Plavix 75mg PO QD, fenofibrate 145mg PO QD    #Diabetes Mellitus type II   - Last HbA1C 7.7 % (11-10-21)  - Will hold home medications  - Monitor FS. Start insulin if FS > 180. Keep between 140 - 180. Carb consistent diet    #Misc  - DVT Prophylaxis: Lovenox  - Diet: DASH/TLC, CC, fluid restriction  - GI Prophylaxis: pantoprazole   - Activity: AAT  - Code Status: Full Code  Dispo acute   55 YO F w/ PMHx of COPD (on 3L home O2), CORNELIUS on CPAP, HFrEF (15-20% on TTE 11/2021), severe pulmonary HTN, CAD s/p PCI to RCA, HTN/HLD, CVA with residual LLE weakness, T2DM, recently quit smoking (1 month ago) presented for shortness of breath that worsened last night.     #Acute CHF exacerbation  #Hx of HFrEF; BNP around baseline   #Hx of COPD on 3L home O2  #Hx of severe pul-HTN  #Mild-mod pericardial effusion; low probability of tamponade    - CT Angio Chest PE Protocol w/ IV Cont: No PE. Cardiomegaly with asymmetric right heart dilatation. Mild/moderate pericardial effusion, slightly increased since prior. Small bilateral pleural effusions, slightly increased since prior.   - TTE 11/14/2021: EF 15-20%, G2DD, mod MR, mild-mod TR, severe P-HTN, small pericardial effusion.  - Echo 02/06: 19% EF, small pericardial effusion, moderate p-HTN, mod-severe MR  - Troponin 0.02; around baseline.  - Daily weight. Strict I & Os. Keep I < O. Fluid restriction  - Continue Metoprolol 50mg PO QD, Entresto 49-51 BID and spironolactone 25mg PO QD  - No intervention per CTS  - c/w bumex gtt on 2/8, BMP BID    #CAD s/p PCI to RCA - Continue ASA 81mg PO QD, Plavix 75mg PO QD, Metoprolol 50mg PO QD    #CORNELIUS on CPAP - CPAP at night     #Hx of CVA with residual weakness   #HTN/DLD  - Continue atorvastatin 80mg PO QD, ASA 81mg PO QD, Plavix 75mg PO QD, fenofibrate 145mg PO QD    #Diabetes Mellitus type II   - Last HbA1C 7.7 % (11-10-21)  - Will hold home medications  - Monitor FS. Start insulin if FS > 180. Keep between 140 - 180. Carb consistent diet    #Misc  - DVT Prophylaxis: Lovenox  - Diet: DASH/TLC, CC, fluid restriction  - GI Prophylaxis: pantoprazole   - Activity: AAT  - Code Status: Full Code  Dispo acute   57 YO F w/ PMHx of COPD (on 3L home O2), CORNELIUS on CPAP, HFrEF (15-20% on TTE 11/2021), severe pulmonary HTN, CAD s/p PCI to RCA, HTN/HLD, CVA with residual LLE weakness, T2DM, recently quit smoking (1 month ago) presented for shortness of breath that worsened last night.     #Acute CHF exacerbation  #Hx of HFrEF; BNP around baseline   #Hx of COPD on 3L home O2  #Hx of severe pul-HTN  #Mild-mod pericardial effusion; low probability of tamponade    - CT Angio Chest PE Protocol w/ IV Cont: No PE. Cardiomegaly with asymmetric right heart dilatation. Mild/moderate pericardial effusion, slightly increased since prior. Small bilateral pleural effusions, slightly increased since prior.   - TTE 11/14/2021: EF 15-20%, G2DD, mod MR, mild-mod TR, severe P-HTN, small pericardial effusion.  - Echo 02/06: 19% EF, small pericardial effusion, moderate p-HTN, mod-severe MR  - Troponin 0.02; around baseline.  - Daily weight. Strict I & Os. Keep I < O. Fluid restriction  - Continue Metoprolol 50mg PO QD, Entresto 49-51 BID and spironolactone 25mg PO QD  - No intervention per CTS  - c/w bumex gtt on 2/8, BMP BID    #CAD s/p PCI to RCA - Continue ASA 81mg PO QD, Plavix 75mg PO QD, Metoprolol 50mg PO QD    #CORNELIUS on CPAP - CPAP at night     #Hx of CVA with residual weakness   #HTN/DLD  - Continue atorvastatin 80mg PO QD, ASA 81mg PO QD, Plavix 75mg PO QD, fenofibrate 145mg PO QD    #Diabetes Mellitus type II   - Last HbA1C 7.7 % (11-10-21)  - Will hold home medications  - Monitor FS. Start insulin if FS > 180. Keep between 140 - 180. Carb consistent diet    # bilat lower ext pain and decreased pulses.     #Misc  - DVT Prophylaxis: Lovenox  - Diet: DASH/TLC, CC, fluid restriction  - GI Prophylaxis: pantoprazole   - Activity: AAT  - Code Status: Full Code  Dispo acute

## 2022-02-10 NOTE — PHYSICAL THERAPY INITIAL EVALUATION ADULT - LEVEL OF INDEPENDENCE: STAIR NEGOTIATION, REHAB EVAL
Patient uses power chair at baseline. Uses ramp outside of residence to enter, and has elevator inside building./unable to perform

## 2022-02-10 NOTE — PHYSICAL THERAPY INITIAL EVALUATION ADULT - SHORT TERM MEMORY, REHAB EVAL
Hospital Medicine ICU Interval Note    Date of Service: 2017    Chief Complaint  Enedelia Pang is a 46 y.o. female w/ h/o pseudotumor cerebri, CVA, prior  shunt with infection thus removal admitted 2017 for migraine headache. Kristi consulted as second opinion and scheduled for  shunt     Interval Problem Update  Out of icu over night. No visual changes. Mild headache that is improving.     Consultants/Specialty  Leppla, surgery  Keyvani, neurology    Disposition  Home once feeling better. Cleared by nsgy          Review of Systems   Constitutional: Negative for fever and chills.   HENT: Negative for sore throat.    Eyes: Negative for blurred vision and pain.   Respiratory: Negative for cough and shortness of breath.    Cardiovascular: Negative for chest pain and palpitations.   Gastrointestinal: Negative for nausea, vomiting, abdominal pain and constipation.   Genitourinary: Negative for dysuria and urgency.   Musculoskeletal: Negative for myalgias, back pain and neck pain.   Skin: Negative for itching and rash.   Neurological: Positive for headaches. Negative for dizziness, tingling and focal weakness.   Psychiatric/Behavioral: Negative for depression. The patient does not have insomnia.    All other systems reviewed and are negative.     Physical Exam  Laboratory/Imaging   Hemodynamics  Temp (24hrs), Av.6 °C (97.9 °F), Min:36.2 °C (97.1 °F), Max:37.2 °C (98.9 °F)   Temperature: 36.4 °C (97.6 °F)  Pulse  Av.5  Min: 78  Max: 150 Heart Rate (Monitored): (!) 103  Blood Pressure: 104/62 mmHg, NIBP: 102/57 mmHg      Respiratory      Respiration: 16, Pulse Oximetry: 94 %             Fluids       Nutrition  Orders Placed This Encounter   Procedures   • DIET ORDER     Standing Status: Standing      Number of Occurrences: 1      Standing Expiration Date:      Order Specific Question:  Diet:     Answer:  Regular [1]     Physical Exam   Constitutional: She is oriented to person, place, and time.  She appears well-developed and well-nourished. No distress.   HENT:   Right Ear: External ear normal.   Left Ear: External ear normal.   Nose: Nose normal.   Mouth/Throat: Oropharynx is clear and moist.   Head dressing intact   Eyes: Right eye exhibits no discharge. Left eye exhibits no discharge. No scleral icterus.   Neck: No JVD present.   Cardiovascular: Normal rate, regular rhythm and normal heart sounds.    No murmur heard.  Pulmonary/Chest: Effort normal and breath sounds normal. No stridor. No respiratory distress. She has no rales.   Abdominal: Soft. Bowel sounds are normal. She exhibits no distension. There is no tenderness.   Musculoskeletal: Normal range of motion. She exhibits no edema or tenderness.   Neurological: She is alert and oriented to person, place, and time.   Skin: Skin is warm and dry. She is not diaphoretic. No erythema.   Psychiatric: She has a normal mood and affect. Her behavior is normal.   Nursing note and vitals reviewed.      Recent Labs      05/31/17 0228 06/01/17   0500   WBC  9.0  9.1   RBC  3.94*  3.62*   HEMOGLOBIN  12.9  11.9*   HEMATOCRIT  39.7  36.6*   MCV  100.8*  101.1*   MCH  32.7  32.9   MCHC  32.5*  32.5*   RDW  51.8*  53.6*   PLATELETCT  349  303   MPV  9.4  9.5     Recent Labs      05/31/17 0228 06/01/17   0500  06/02/17   0248   SODIUM  137  139  139   POTASSIUM  3.3*  3.6  3.5*   CHLORIDE  107  108  109   CO2  23  23  22   GLUCOSE  94  91  104*   BUN  14  12  15   CREATININE  0.67  0.55  0.41*   CALCIUM  9.5  8.9  8.5                      Assessment/Plan     * Status migrainosus (present on admission)  Assessment & Plan    Appreciate neurology consult, Dr Ibarra- signed off  Now s/p  shunt placement    Anxiety (present on admission)  Assessment & Plan  S/p psychiatry consult, Dr Puga; denies SI   Ativan prn  Cymbalta      Diarrhea (present on admission)  Assessment & Plan  Chronic, no acute management    LFT elevation (present on admission)  Assessment  & Plan  Trending, still elevated, though no major change  If worsened stop methotrexate per rheumatology    Whole body pain (present on admission)  Assessment & Plan  Outpatient w/u    Chronic pain (present on admission)  Assessment & Plan  Avoid escalation in meds.     Rheumatoid arthritis (CMS-HCC) (present on admission)  Assessment & Plan   O/P workup, on current tentative TX      Labs reviewed, Medications reviewed, EKG reviewed and Radiology images reviewed  Lopez catheter: No Lopez      DVT Prophylaxis: Enoxaparin (Lovenox)    Ulcer prophylaxis: Not indicated    Assessed for rehab: Patient was assess for and/or received rehabilitation services during this hospitalization           intact

## 2022-02-10 NOTE — PROGRESS NOTE ADULT - ASSESSMENT
Acute on chronic systolic HF / Fluid overload / pulmonary HTN / COPD / DM2 / Anxiety     Patient remains fluid overloaded on exam- POCUS exam: IVC 2.5cm, non collapsing   Continue Bumex gtt at 1 mg/hr  Continue Entresto 49-51 mg BID   Continue metoprolol XL 50 mg daily   Continue Spironolactone 25 mg daily   Get BMP twice daily with magnesium   Maintain potassium >4.0, Mg >2.1  Strict intake and output  Daily weight   Plan discussed with primary team  Will continue to follow.   Acute on chronic systolic HF / Fluid overload / pulmonary HTN / COPD / DM2 / Anxiety     Patient remains fluid overloaded on exam- POCUS exam: IVC 2.5cm, non collapsing   Continue Bumex gtt at 1 mg/hr  Continue Entresto 49-51 mg BID   Continue metoprolol XL 50 mg daily   Continue Spironolactone 25 mg daily   Get BMP twice daily with magnesium   Maintain potassium >4.0, Mg >2.1  Strict intake and output  Daily weight   Plan discussed with primary team  Will continue to follow

## 2022-02-10 NOTE — PROGRESS NOTE ADULT - SUBJECTIVE AND OBJECTIVE BOX
Date of Admission: 22    Interval History:   - Patient resting in bed, 4L NC, NAD  - Stated she feels better today, breathing improved  - Kidney function remains stable, good urine output with IV diuresis     HISTORY OF PRESENT ILLNESS: 55 YO F w/ PMHx of COPD (on 3L home O2), CORNELIUS on CPAP, HFrEF (15-20% on TTE 2021), severe pulmonary HTN, CAD s/p PCI to RCA, HTN/HLD, CVA with residual LLE weakness, T2DM, recently quit smoking (1 month ago) presented for shortness of breath that worsened last night. As per the patient she was noticing worsening LE swelling and increased SOB for the last few days. Last last she had chinese food and then her SOB worsened. Per EMS, patient was placed on NRB 10L and one albuterol treatment with improvement en route. Pt was recently admitted in 2021 for b/l lower extremity edema and was found to be COVID-19 positive at the time. Reports being vaccinated against COVID-19.   Denies fever/chills. No chest pain, palpitations, abd pain, N/V/D. Has cough with sputum at baseline, denies any change in cough or sputum. Pt wheelchair bound at baseline.  In the ED VS were sig for a HR of 121 bpm. She was placed on BiPAP and then transitioned to NC  CT angio showed mild-mod pericardial effusion. ED spoke with CT surgery and they will evaluate  (2022 16:33)    Patient well known to heart failure team from multiple previous admissions. Patient reports progressively worsening SOB and LE edema x3 days, exacerbated by non compliance with low Na diet (Chinese take out food) the night before admission. Patient also noticed abdominal bloating, decreased appetite, and pain in legs while walking right before admission as well. Denies chest pain, palpitations, n/v, cough, or abnormal bleeding.       PAST MEDICAL & SURGICAL HISTORY:  Hypertension  Hyperlipidemia  Anxiety and depression  COPD, severe  CHF (congestive heart failure)  Cerebrovascular accident (CVA)Multiple  Type 2 diabetes mellitus  CKD (chronic kidney disease), stage II  No significant past surgical history        FAMILY HISTORY:  No pertinent family history of premature cardiovascular disease in first degree relatives.  Mother:  of cancer at 65  Father:  of an overdose at 50    SOCIAL HISTORY:    Current every day smoker, 2 cigarettes a day, denies alcohol or drug use    Allergies  No Known Allergies    Intolerances      PHYSICAL EXAM:  General Appearance: well appearing, normal for age and gender. 	  Neck: unable to assess due to body habitus  Eyes: Extra Ocular muscles intact.   Cardiovascular: regular rate and rhythm S1 S2, , No murmurs,+2 B/L pedal edema  Respiratory: B/L anterior expiratory wheezing, posterior lung fields clear  Psychiatry: Alert and oriented x 3, Mood & affect appropriate  Gastrointestinal:  Soft, Non-tender  Skin/Integumentary : No rashes, No ecchymoses, No cyanosis	  Neurologic: Non-focal  Musculoskeletal/ extremities: Normal range of motion, No clubbing, cyanosis   Vascular: Peripheral pulses palpable 2+ bilaterally    CARDIAC MARKERS:  Serum Pro-Brain Natriuretic Peptide: 4892 pg/mL (22 @ 10:50)      TELEMETRY EVENTS: 	    EC/6/22      Ventricular Rate 116 BPM  Atrial Rate 116 BPM  P-R Interval 148 ms  QRS Duration 148 ms  Q-T Interval 346 ms  QTC Calculation(Bazett) 480 ms  R Axis 162 degrees  T Axis -11 degrees    Diagnosis Line Sinus tachycardia  Right axis deviation  Non-specific intra-ventricular conduction delay  Abnormal ECG    Confirmed by Lisa Combs MDfer ([id number]) on 2022 4:06:55 PM        PREVIOUS DIAGNOSTIC TESTING:      TTE 22    Summary:   1. LV Ejection Fraction by Urena's Method with a biplane EF of 19 %.   2. Severely decreased global left ventricular systolic function.   3. Dilated RV with moderately reduced systolic function.   4. Moderately enlarged left atrium.   5. Severely enlarged right atrium.   6. Sclerotic aorticvalve with normal opening.   7. Mild aortic regurgitation.   8. The mitral valve leaflets are tethered due to reduced systolic   function and elevated LVDP.   9. Moderate-to-severe mitral regurgitation.  10. Severe tricuspid regurgitation.  11. Estimated pulmonary artery systolic pressure is 53.7 mmHg assuming a   right atrial pressure of 15 mmHg, which is consistent with moderate   pulmonary hypertension.  12. Small pericardial effusion adjacent to the right atrium.        TTE 21    Summary:   1. Left ventricular ejection fraction, by visual estimation, is 35 to 40%.   2.Moderately decreased global left ventricular systolic function.   3. Multiple left ventricular regional wall motion abnormalities exist. See wall motion findings.   4. Elevated left atrial and left ventricular end-diastolic pressures.   5. Mild concentric left ventricular hypertrophy.   6. Mildly increased LV wall thickness.   7. Normal left ventricular internal cavity size.   8. Spectral Doppler shows restrictive pattern of left ventricular myocardial filling (Grade III diastolic dysfunction).  9. Moderately reduced RV systolic function.  10. Mildly enlarged left atrium.  11. Moderately enlarged right atrium.  12. Small pericardial effusion.  13. Mild to moderate mitral valve regurgitation.  14. Moderate-severe tricuspid regurgitation.  15.Mild aortic regurgitation.  16. Sclerotic aortic valve with normal opening.  17. Estimated pulmonary artery systolic pressure is 50.0 mmHg assuming a right atrial pressure of 15 mmHg, which is consistent with moderate pulmonary hypertension.  18. Pulmonary hypertension is present.  19. LA volume Index is 36.7 ml/m² ml/m2.    	    Home Medications:  Albuterol (Eqv-ProAir HFA) 90 mcg/inh inhalation aerosol: 2 puff(s) inhaled every 6 hours, As Needed (2021 11:22)  aspirin 81 mg oral tablet: 1 tab(s) orally once a day (2021 11:22)  fenofibrate 145 mg oral tablet: 1 tab(s) orally once a day (2021 11:22)  glipiZIDE 10 mg oral tablet: 1 tab(s) orally 2 times a day (2021 11:22)  Lovaza 1000 mg oral capsule: 2 cap(s) orally 2 times a day (2021 14:52)  metFORMIN 1000 mg oral tablet: 1 tab(s) orally 2 times a day Do not take until  (2021 12:58)  Plavix 75 mg oral tablet: 1 tab(s) orally once a day (2021 11:22)  Vitamin D2 1.25 mg (50,000 intl units) oral capsule: 1 cap(s) orally once a week (2021 14:52)    MEDICATIONS  (STANDING):  aspirin  chewable 81 milliGRAM(s) Oral daily  atorvastatin 80 milliGRAM(s) Oral at bedtime  budesonide 160 MICROgram(s)/formoterol 4.5 MICROgram(s) Inhaler 2 Puff(s) Inhalation two times a day  chlorhexidine 4% Liquid 1 Application(s) Topical <User Schedule>  clopidogrel Tablet 75 milliGRAM(s) Oral daily  enoxaparin Injectable 40 milliGRAM(s) SubCutaneous daily  fenofibrate Tablet 145 milliGRAM(s) Oral daily  furosemide   Injectable 40 milliGRAM(s) IV Push two times a day  magnesium sulfate  IVPB 2 Gram(s) IV Intermittent once  metoprolol succinate ER 50 milliGRAM(s) Oral daily  omega-3-Acid Ethyl Esters 2 Gram(s) Oral two times a day  pantoprazole    Tablet 40 milliGRAM(s) Oral before breakfast  sacubitril 49 mG/valsartan 51 mG 1 Tablet(s) Oral two times a day    MEDICATIONS  (PRN):  ALBUTerol    90 MICROgram(s) HFA Inhaler 2 Puff(s) Inhalation every 6 hours PRN Shortness of Breath and/or Wheezing         Date of Admission: 22    Interval History:   - Patient resting in bed, 4L NC, NAD  - Stated she feels better today, breathing improved  - Kidney function remains stable, good urine output with IV diuresis       HISTORY OF PRESENT ILLNESS: 55 YO F w/ PMHx of COPD (on 3L home O2), CORNELIUS on CPAP, HFrEF (15-20% on TTE 2021), severe pulmonary HTN, CAD s/p PCI to RCA, HTN/HLD, CVA with residual LLE weakness, T2DM, recently quit smoking (1 month ago) presented for shortness of breath that worsened last night. As per the patient she was noticing worsening LE swelling and increased SOB for the last few days. Last last she had chinese food and then her SOB worsened. Per EMS, patient was placed on NRB 10L and one albuterol treatment with improvement en route. Pt was recently admitted in 2021 for b/l lower extremity edema and was found to be COVID-19 positive at the time. Reports being vaccinated against COVID-19.   Denies fever/chills. No chest pain, palpitations, abd pain, N/V/D. Has cough with sputum at baseline, denies any change in cough or sputum. Pt wheelchair bound at baseline.  In the ED VS were sig for a HR of 121 bpm. She was placed on BiPAP and then transitioned to NC  CT angio showed mild-mod pericardial effusion. ED spoke with CT surgery and they will evaluate  (2022 16:33)    Patient well known to heart failure team from multiple previous admissions. Patient reports progressively worsening SOB and LE edema x3 days, exacerbated by non compliance with low Na diet (Chinese take out food) the night before admission. Patient also noticed abdominal bloating, decreased appetite, and pain in legs while walking right before admission as well. Denies chest pain, palpitations, n/v, cough, or abnormal bleeding.       PAST MEDICAL & SURGICAL HISTORY:  Hypertension  Hyperlipidemia  Anxiety and depression  COPD, severe  CHF (congestive heart failure)  Cerebrovascular accident (CVA)Multiple  Type 2 diabetes mellitus  CKD (chronic kidney disease), stage II  No significant past surgical history        FAMILY HISTORY:  No pertinent family history of premature cardiovascular disease in first degree relatives.  Mother:  of cancer at 65  Father:  of an overdose at 50    SOCIAL HISTORY:    Current every day smoker, 2 cigarettes a day, denies alcohol or drug use    Allergies  No Known Allergies    Intolerances      PHYSICAL EXAM:  General Appearance: well appearing, normal for age and gender. 	  Neck: unable to assess due to body habitus  Eyes: Extra Ocular muscles intact.   Cardiovascular: regular rate and rhythm S1 S2, , No murmurs,+2 B/L pedal edema  Respiratory: B/L anterior expiratory wheezing, posterior lung fields clear  Psychiatry: Alert and oriented x 3, Mood & affect appropriate  Gastrointestinal:  Soft, Non-tender  Skin/Integumentary : No rashes, No ecchymoses, No cyanosis	  Neurologic: Non-focal  Musculoskeletal/ extremities: Normal range of motion, No clubbing, cyanosis   Vascular: Peripheral pulses palpable 2+ bilaterally    CARDIAC MARKERS:  Serum Pro-Brain Natriuretic Peptide: 4892 pg/mL (22 @ 10:50)      TELEMETRY EVENTS: 	    EC/6/22      Ventricular Rate 116 BPM  Atrial Rate 116 BPM  P-R Interval 148 ms  QRS Duration 148 ms  Q-T Interval 346 ms  QTC Calculation(Bazett) 480 ms  R Axis 162 degrees  T Axis -11 degrees    Diagnosis Line Sinus tachycardia  Right axis deviation  Non-specific intra-ventricular conduction delay  Abnormal ECG    Confirmed by Lisa Combs MDfer ([id number]) on 2022 4:06:55 PM        PREVIOUS DIAGNOSTIC TESTING:      TTE 22    Summary:   1. LV Ejection Fraction by Urena's Method with a biplane EF of 19 %.   2. Severely decreased global left ventricular systolic function.   3. Dilated RV with moderately reduced systolic function.   4. Moderately enlarged left atrium.   5. Severely enlarged right atrium.   6. Sclerotic aorticvalve with normal opening.   7. Mild aortic regurgitation.   8. The mitral valve leaflets are tethered due to reduced systolic   function and elevated LVDP.   9. Moderate-to-severe mitral regurgitation.  10. Severe tricuspid regurgitation.  11. Estimated pulmonary artery systolic pressure is 53.7 mmHg assuming a   right atrial pressure of 15 mmHg, which is consistent with moderate   pulmonary hypertension.  12. Small pericardial effusion adjacent to the right atrium.        TTE 21    Summary:   1. Left ventricular ejection fraction, by visual estimation, is 35 to 40%.   2.Moderately decreased global left ventricular systolic function.   3. Multiple left ventricular regional wall motion abnormalities exist. See wall motion findings.   4. Elevated left atrial and left ventricular end-diastolic pressures.   5. Mild concentric left ventricular hypertrophy.   6. Mildly increased LV wall thickness.   7. Normal left ventricular internal cavity size.   8. Spectral Doppler shows restrictive pattern of left ventricular myocardial filling (Grade III diastolic dysfunction).  9. Moderately reduced RV systolic function.  10. Mildly enlarged left atrium.  11. Moderately enlarged right atrium.  12. Small pericardial effusion.  13. Mild to moderate mitral valve regurgitation.  14. Moderate-severe tricuspid regurgitation.  15.Mild aortic regurgitation.  16. Sclerotic aortic valve with normal opening.  17. Estimated pulmonary artery systolic pressure is 50.0 mmHg assuming a right atrial pressure of 15 mmHg, which is consistent with moderate pulmonary hypertension.  18. Pulmonary hypertension is present.  19. LA volume Index is 36.7 ml/m² ml/m2.    	    Home Medications:  Albuterol (Eqv-ProAir HFA) 90 mcg/inh inhalation aerosol: 2 puff(s) inhaled every 6 hours, As Needed (2021 11:22)  aspirin 81 mg oral tablet: 1 tab(s) orally once a day (2021 11:22)  fenofibrate 145 mg oral tablet: 1 tab(s) orally once a day (2021 11:22)  glipiZIDE 10 mg oral tablet: 1 tab(s) orally 2 times a day (2021 11:22)  Lovaza 1000 mg oral capsule: 2 cap(s) orally 2 times a day (2021 14:52)  metFORMIN 1000 mg oral tablet: 1 tab(s) orally 2 times a day Do not take until  (2021 12:58)  Plavix 75 mg oral tablet: 1 tab(s) orally once a day (2021 11:22)  Vitamin D2 1.25 mg (50,000 intl units) oral capsule: 1 cap(s) orally once a week (2021 14:52)    MEDICATIONS  (STANDING):  aspirin  chewable 81 milliGRAM(s) Oral daily  atorvastatin 80 milliGRAM(s) Oral at bedtime  budesonide 160 MICROgram(s)/formoterol 4.5 MICROgram(s) Inhaler 2 Puff(s) Inhalation two times a day  chlorhexidine 4% Liquid 1 Application(s) Topical <User Schedule>  clopidogrel Tablet 75 milliGRAM(s) Oral daily  enoxaparin Injectable 40 milliGRAM(s) SubCutaneous daily  fenofibrate Tablet 145 milliGRAM(s) Oral daily  furosemide   Injectable 40 milliGRAM(s) IV Push two times a day  magnesium sulfate  IVPB 2 Gram(s) IV Intermittent once  metoprolol succinate ER 50 milliGRAM(s) Oral daily  omega-3-Acid Ethyl Esters 2 Gram(s) Oral two times a day  pantoprazole    Tablet 40 milliGRAM(s) Oral before breakfast  sacubitril 49 mG/valsartan 51 mG 1 Tablet(s) Oral two times a day    MEDICATIONS  (PRN):  ALBUTerol    90 MICROgram(s) HFA Inhaler 2 Puff(s) Inhalation every 6 hours PRN Shortness of Breath and/or Wheezing

## 2022-02-10 NOTE — PROGRESS NOTE ADULT - ATTENDING COMMENTS
Patient seen and examined independently. Agree with resident note/ history / physical exam and plan of care with following exceptions/additions/updates. Case discussed with patient/pt decision maker, house-staff and nursing. My notes supersedes the resident's notes in case of discrepancies.    a/p  # lower ext pain, and decreased pulses, bilat lower ext arterial dupplex, vascular eval   # Acute on chronic HFrEF, followup with cardio and heart failure team.   # Hypertension cont meds  # Hyperlipidemia cont meds  # Anxiety and depression, cont meds  # COPD, severe, cont meds  # hx of cva, and gait disturbance. has hha  # Type 2 diabetes mellitus, cont meds,  # CKD (chronic kidney disease), stage II at baseline, monitor cr   creatinine trend  1.1 (02-10-22 @ 06:14)  1.3 (02-09-22 @ 21:44)  1.2 (02-09-22 @ 11:00)  1.3 (02-08-22 @ 22:10)  1.2 (02-08-22 @ 06:30)    #Progress Note Handoff  Pending (specify):  Consults: vascular, tests: arterial dupplex   Family discussion: natalie pt  Disposition: Home

## 2022-02-10 NOTE — PHYSICAL THERAPY INITIAL EVALUATION ADULT - ADDITIONAL COMMENTS
Patient uses power chair at baseline. Uses ramp outside of residence to enter, and has elevator inside building.

## 2022-02-11 LAB
ANION GAP SERPL CALC-SCNC: 17 MMOL/L — HIGH (ref 7–14)
BASOPHILS # BLD AUTO: 0.09 K/UL — SIGNIFICANT CHANGE UP (ref 0–0.2)
BASOPHILS NFR BLD AUTO: 1.8 % — HIGH (ref 0–1)
BUN SERPL-MCNC: 17 MG/DL — SIGNIFICANT CHANGE UP (ref 10–20)
C DIFF TOX GENS STL QL NAA+PROBE: SIGNIFICANT CHANGE UP
CALCIUM SERPL-MCNC: 8.7 MG/DL — SIGNIFICANT CHANGE UP (ref 8.5–10.1)
CHLORIDE SERPL-SCNC: 91 MMOL/L — LOW (ref 98–110)
CO2 SERPL-SCNC: 30 MMOL/L — SIGNIFICANT CHANGE UP (ref 17–32)
CREAT SERPL-MCNC: 1.2 MG/DL — SIGNIFICANT CHANGE UP (ref 0.7–1.5)
CULTURE RESULTS: SIGNIFICANT CHANGE UP
EOSINOPHIL # BLD AUTO: 0.11 K/UL — SIGNIFICANT CHANGE UP (ref 0–0.7)
EOSINOPHIL NFR BLD AUTO: 2.2 % — SIGNIFICANT CHANGE UP (ref 0–8)
GLUCOSE BLDC GLUCOMTR-MCNC: 106 MG/DL — HIGH (ref 70–99)
GLUCOSE BLDC GLUCOMTR-MCNC: 125 MG/DL — HIGH (ref 70–99)
GLUCOSE BLDC GLUCOMTR-MCNC: 159 MG/DL — HIGH (ref 70–99)
GLUCOSE SERPL-MCNC: 174 MG/DL — HIGH (ref 70–99)
HCT VFR BLD CALC: 42.8 % — SIGNIFICANT CHANGE UP (ref 37–47)
HGB BLD-MCNC: 13.7 G/DL — SIGNIFICANT CHANGE UP (ref 12–16)
IMM GRANULOCYTES NFR BLD AUTO: 0.4 % — HIGH (ref 0.1–0.3)
LYMPHOCYTES # BLD AUTO: 1.39 K/UL — SIGNIFICANT CHANGE UP (ref 1.2–3.4)
LYMPHOCYTES # BLD AUTO: 28.4 % — SIGNIFICANT CHANGE UP (ref 20.5–51.1)
MAGNESIUM SERPL-MCNC: 1.5 MG/DL — LOW (ref 1.8–2.4)
MCHC RBC-ENTMCNC: 28.9 PG — SIGNIFICANT CHANGE UP (ref 27–31)
MCHC RBC-ENTMCNC: 32 G/DL — SIGNIFICANT CHANGE UP (ref 32–37)
MCV RBC AUTO: 90.3 FL — SIGNIFICANT CHANGE UP (ref 81–99)
MONOCYTES # BLD AUTO: 0.37 K/UL — SIGNIFICANT CHANGE UP (ref 0.1–0.6)
MONOCYTES NFR BLD AUTO: 7.6 % — SIGNIFICANT CHANGE UP (ref 1.7–9.3)
NEUTROPHILS # BLD AUTO: 2.92 K/UL — SIGNIFICANT CHANGE UP (ref 1.4–6.5)
NEUTROPHILS NFR BLD AUTO: 59.6 % — SIGNIFICANT CHANGE UP (ref 42.2–75.2)
NRBC # BLD: 0 /100 WBCS — SIGNIFICANT CHANGE UP (ref 0–0)
PLATELET # BLD AUTO: 258 K/UL — SIGNIFICANT CHANGE UP (ref 130–400)
POTASSIUM SERPL-MCNC: 3.3 MMOL/L — LOW (ref 3.5–5)
POTASSIUM SERPL-SCNC: 3.3 MMOL/L — LOW (ref 3.5–5)
RBC # BLD: 4.74 M/UL — SIGNIFICANT CHANGE UP (ref 4.2–5.4)
RBC # FLD: 22.4 % — HIGH (ref 11.5–14.5)
SODIUM SERPL-SCNC: 138 MMOL/L — SIGNIFICANT CHANGE UP (ref 135–146)
SPECIMEN SOURCE: SIGNIFICANT CHANGE UP
WBC # BLD: 4.9 K/UL — SIGNIFICANT CHANGE UP (ref 4.8–10.8)
WBC # FLD AUTO: 4.9 K/UL — SIGNIFICANT CHANGE UP (ref 4.8–10.8)

## 2022-02-11 PROCEDURE — 99233 SBSQ HOSP IP/OBS HIGH 50: CPT

## 2022-02-11 RX ORDER — POTASSIUM CHLORIDE 20 MEQ
40 PACKET (EA) ORAL ONCE
Refills: 0 | Status: COMPLETED | OUTPATIENT
Start: 2022-02-11 | End: 2022-02-11

## 2022-02-11 RX ORDER — POTASSIUM CHLORIDE 20 MEQ
20 PACKET (EA) ORAL
Refills: 0 | Status: COMPLETED | OUTPATIENT
Start: 2022-02-11 | End: 2022-02-11

## 2022-02-11 RX ORDER — MAGNESIUM SULFATE 500 MG/ML
2 VIAL (ML) INJECTION ONCE
Refills: 0 | Status: COMPLETED | OUTPATIENT
Start: 2022-02-11 | End: 2022-02-11

## 2022-02-11 RX ADMIN — QUETIAPINE FUMARATE 25 MILLIGRAM(S): 200 TABLET, FILM COATED ORAL at 21:48

## 2022-02-11 RX ADMIN — SACUBITRIL AND VALSARTAN 1 TABLET(S): 24; 26 TABLET, FILM COATED ORAL at 06:35

## 2022-02-11 RX ADMIN — MAGNESIUM OXIDE 400 MG ORAL TABLET 400 MILLIGRAM(S): 241.3 TABLET ORAL at 09:12

## 2022-02-11 RX ADMIN — SPIRONOLACTONE 25 MILLIGRAM(S): 25 TABLET, FILM COATED ORAL at 06:35

## 2022-02-11 RX ADMIN — BUMETANIDE 5 MG/HR: 0.25 INJECTION INTRAMUSCULAR; INTRAVENOUS at 16:17

## 2022-02-11 RX ADMIN — Medication 1: at 08:11

## 2022-02-11 RX ADMIN — Medication 2 MILLIGRAM(S): at 06:35

## 2022-02-11 RX ADMIN — MAGNESIUM OXIDE 400 MG ORAL TABLET 400 MILLIGRAM(S): 241.3 TABLET ORAL at 21:48

## 2022-02-11 RX ADMIN — MAGNESIUM OXIDE 400 MG ORAL TABLET 400 MILLIGRAM(S): 241.3 TABLET ORAL at 12:58

## 2022-02-11 RX ADMIN — Medication 50 MILLIEQUIVALENT(S): at 16:18

## 2022-02-11 RX ADMIN — Medication 40 MILLIEQUIVALENT(S): at 17:31

## 2022-02-11 RX ADMIN — Medication 50 MILLIGRAM(S): at 06:36

## 2022-02-11 RX ADMIN — CLOPIDOGREL BISULFATE 75 MILLIGRAM(S): 75 TABLET, FILM COATED ORAL at 11:39

## 2022-02-11 RX ADMIN — SACUBITRIL AND VALSARTAN 1 TABLET(S): 24; 26 TABLET, FILM COATED ORAL at 17:31

## 2022-02-11 RX ADMIN — Medication 81 MILLIGRAM(S): at 11:39

## 2022-02-11 RX ADMIN — Medication 25 GRAM(S): at 16:18

## 2022-02-11 RX ADMIN — ATORVASTATIN CALCIUM 80 MILLIGRAM(S): 80 TABLET, FILM COATED ORAL at 21:48

## 2022-02-11 RX ADMIN — Medication 145 MILLIGRAM(S): at 11:35

## 2022-02-11 RX ADMIN — ENOXAPARIN SODIUM 40 MILLIGRAM(S): 100 INJECTION SUBCUTANEOUS at 11:39

## 2022-02-11 RX ADMIN — PANTOPRAZOLE SODIUM 40 MILLIGRAM(S): 20 TABLET, DELAYED RELEASE ORAL at 06:36

## 2022-02-11 RX ADMIN — BUDESONIDE AND FORMOTEROL FUMARATE DIHYDRATE 2 PUFF(S): 160; 4.5 AEROSOL RESPIRATORY (INHALATION) at 09:12

## 2022-02-11 NOTE — PROGRESS NOTE ADULT - ASSESSMENT
Acute on chronic systolic HF / Fluid overload / pulmonary HTN / COPD / DM2 / Anxiety     Patient remains fluid overloaded on exam- POCUS exam: IVC 2.3 cm, non collapsing   Continue Bumex gtt at 1 mg/hr  Continue Entresto 49-51 mg BID   Continue metoprolol XL 50 mg daily   Continue Spironolactone 25 mg daily   Get BMP twice daily with magnesium   Maintain potassium >4.0, Mg >2.1  Strict intake and output  Daily weight   Plan discussed with primary team  Will continue to follow   Acute on chronic systolic HF / Fluid overload / pulmonary HTN / COPD / DM2 / Anxiety     Patient remains fluid overloaded on exam- POCUS exam: IVC 2.3 cm, non collapsing   Continue Bumex gtt at 1 mg/hr  Continue Entresto 49-51 mg BID   Continue metoprolol XL 50 mg daily   Continue Spironolactone 25 mg daily   Get BMP twice daily with magnesium   Maintain potassium >4.0, Mg >2.1  Strict intake and output  PT eval   Incentive inspirometer  Daily weight   Plan discussed with primary team  Will continue to follow

## 2022-02-11 NOTE — PROGRESS NOTE ADULT - ASSESSMENT
57 YO F w/ PMHx of COPD (on 3L home O2), CORNELIUS on CPAP, HFrEF (15-20% on TTE 11/2021), severe pulmonary HTN, CAD s/p PCI to RCA, HTN/HLD, CVA with residual LLE weakness, T2DM, recently quit smoking (1 month ago) presented for shortness of breath that worsened last night.     #Acute CHF exacerbation  #Hx of HFrEF; BNP around baseline   #Hx of COPD on 3L home O2  #Hx of severe pul-HTN  #Mild-mod pericardial effusion; low probability of tamponade    - CT Angio Chest PE Protocol w/ IV Cont: No PE. Cardiomegaly with asymmetric right heart dilatation. Mild/moderate pericardial effusion, slightly increased since prior. Small bilateral pleural effusions, slightly increased since prior.   - TTE 11/14/2021: EF 15-20%, G2DD, mod MR, mild-mod TR, severe P-HTN, small pericardial effusion.  - Echo 02/06: 19% EF, small pericardial effusion, moderate p-HTN, mod-severe MR  - Troponin 0.02; around baseline.  - Daily weight. Strict I & Os. Keep I < O. Fluid restriction  - Continue Metoprolol 50mg PO QD, Entresto 49-51 BID and spironolactone 25mg PO QD  - No intervention per CTS  - c/w bumex gtt on 2/8, BMP BID    #CAD s/p PCI to RCA - Continue ASA 81mg PO QD, Plavix 75mg PO QD, Metoprolol 50mg PO QD    #CORNELIUS on CPAP - CPAP at night     #Hx of CVA with residual weakness   #HTN/DLD  - Continue atorvastatin 80mg PO QD, ASA 81mg PO QD, Plavix 75mg PO QD, fenofibrate 145mg PO QD    #Diabetes Mellitus type II   - Last HbA1C 7.7 % (11-10-21)  - Will hold home medications  - Monitor FS. Start insulin if FS > 180. Keep between 140 - 180. Carb consistent diet    # bilat lower ext pain and decreased pulses.     #Misc  - DVT Prophylaxis: Lovenox  - Diet: DASH/TLC, CC, fluid restriction  - GI Prophylaxis: pantoprazole   - Activity: AAT  - Code Status: Full Code  Dispo acute     57 YO F w/ PMHx of COPD (on 3L home O2), CORNELIUS on CPAP, HFrEF (15-20% on TTE 11/2021), severe pulmonary HTN, CAD s/p PCI to RCA, HTN/HLD, CVA with residual LLE weakness, T2DM, recently quit smoking (1 month ago) presented for shortness of breath that worsened last night.     #Acute CHF exacerbation  #Hx of HFrEF; BNP around baseline   #Hx of COPD on 3L home O2  #Hx of severe pul-HTN  #Mild-mod pericardial effusion; low probability of tamponade    - CT Angio Chest PE Protocol w/ IV Cont: No PE. Cardiomegaly with asymmetric right heart dilatation. Mild/moderate pericardial effusion, slightly increased since prior. Small bilateral pleural effusions, slightly increased since prior.   - TTE 11/14/2021: EF 15-20%, G2DD, mod MR, mild-mod TR, severe P-HTN, small pericardial effusion.  - Echo 02/06: 19% EF, small pericardial effusion, moderate p-HTN, mod-severe MR  - Troponin 0.02; around baseline.  - Daily weight. Strict I & Os. Keep I < O. Fluid restriction  - Continue Metoprolol 50mg PO QD, Entresto 49-51 BID and spironolactone 25mg PO QD  - No intervention per CTS  - c/w bumex gtt on 2/8, BMP BID    #CAD s/p PCI to RCA - Continue ASA 81mg PO QD, Plavix 75mg PO QD, Metoprolol 50mg PO QD    #CORNELIUS on CPAP - CPAP at night     #Hx of CVA with residual weakness   #HTN/DLD  - Continue atorvastatin 80mg PO QD, ASA 81mg PO QD, Plavix 75mg PO QD, fenofibrate 145mg PO QD    #Diabetes Mellitus type II   - Last HbA1C 7.7 % (11-10-21)  - Will hold home medications  - Monitor FS. Start insulin if FS > 180. Keep between 140 - 180. Carb consistent diet    # bilat lower ext pain and decreased pulses.       #Misc  - DVT Prophylaxis: Lovenox  - Diet: DASH/TLC, CC, fluid restriction  - GI Prophylaxis: pantoprazole   - Activity: AAT  - Code Status: Full Code  Dispo acute     57 YO F w/ PMHx of COPD (on 3L home O2), CORNELIUS on CPAP, HFrEF (15-20% on TTE 11/2021), severe pulmonary HTN, CAD s/p PCI to RCA, HTN/HLD, CVA with residual LLE weakness, T2DM, recently quit smoking (1 month ago) presented for shortness of breath that worsened last night.     #Acute CHF exacerbation  #Hx of HFrEF; BNP around baseline   #Hx of COPD on 3L home O2  #Hx of severe pul-HTN  #Mild-mod pericardial effusion; low probability of tamponade    - CT Angio Chest PE Protocol w/ IV Cont: No PE. Cardiomegaly with asymmetric right heart dilatation. Mild/moderate pericardial effusion, slightly increased since prior. Small bilateral pleural effusions, slightly increased since prior.   - TTE 11/14/2021: EF 15-20%, G2DD, mod MR, mild-mod TR, severe P-HTN, small pericardial effusion.  - Echo 02/06: 19% EF, small pericardial effusion, moderate p-HTN, mod-severe MR  - Troponin 0.02; around baseline.  - Daily weight. Strict I & Os. Keep I < O. Fluid restriction  - Continue Metoprolol 50mg PO QD, Entresto 49-51 BID and spironolactone 25mg PO QD  - No intervention per CTS  - c/w bumex gtt on 2/8, BMP BID  - f/u with HF team    # PAD  - duplex reported over the phone with extensive PAD  - f/u official report  - vascular following    #CAD s/p PCI to RCA - Continue ASA 81mg PO QD, Plavix 75mg PO QD, Metoprolol 50mg PO QD    #CORNELIUS on CPAP - CPAP at night     #Hx of CVA with residual weakness   #HTN/DLD  - Continue atorvastatin 80mg PO QD, ASA 81mg PO QD, Plavix 75mg PO QD, fenofibrate 145mg PO QD    #Diabetes Mellitus type II   - Last HbA1C 7.7 % (11-10-21)  - Will hold home medications  - Monitor FS. Start insulin if FS > 180. Keep between 140 - 180. Carb consistent diet    # bilat lower ext pain and decreased pulses.       #Misc  - DVT Prophylaxis: Lovenox  - Diet: DASH/TLC, CC, fluid restriction  - GI Prophylaxis: pantoprazole   - Activity: AAT  - Code Status: Full Code  Dispo acute

## 2022-02-11 NOTE — PROGRESS NOTE ADULT - ATTENDING COMMENTS
Patient seen and examined independently. Agree with resident note/ history / physical exam and plan of care with following exceptions/additions/updates. Case discussed with patient/pt decision maker, house-staff and nursing. My notes supersedes the resident's notes in case of discrepancies.    a/p  # lower ext pain, and decreased pulses, bilat lower ext arterial dupplex done yesterday   followup arterial dupplex and vascular consult    # Acute on chronic HFrEF, followup with cardio and heart failure team. cont iv diuretics,     # Hypertension cont meds  # Hyperlipidemia cont meds  # Anxiety and depression, cont meds  # COPD, severe, cont meds  # hx of cva, and gait disturbance. has HHA  # Type 2 diabetes mellitus, cont meds,  # CKD (chronic kidney disease), stage II at baseline, monitor cr  creatinine trend  1.2 (02-11-22 @ 04:30)  1.2 (02-10-22 @ 16:50)  1.1 (02-10-22 @ 06:14)  1.3 (02-09-22 @ 21:44)  1.2 (02-09-22 @ 11:00)  1.3 (02-08-22 @ 22:10)    #Progress Note Handoff  Pending (specify):  Consults: vascular, arterial dupplex   Family discussion: natalie pt  Disposition: Home

## 2022-02-11 NOTE — PROGRESS NOTE ADULT - SUBJECTIVE AND OBJECTIVE BOX
Date of Admission: 22    Interval History:   - Patient resting in bed, in NAD  - Responding well to IV diuretics      HISTORY OF PRESENT ILLNESS: 55 YO F w/ PMHx of COPD (on 3L home O2), CORNELIUS on CPAP, HFrEF (15-20% on TTE 2021), severe pulmonary HTN, CAD s/p PCI to RCA, HTN/HLD, CVA with residual LLE weakness, T2DM, recently quit smoking (1 month ago) presented for shortness of breath that worsened last night. As per the patient she was noticing worsening LE swelling and increased SOB for the last few days. Last last she had chinese food and then her SOB worsened. Per EMS, patient was placed on NRB 10L and one albuterol treatment with improvement en route. Pt was recently admitted in 2021 for b/l lower extremity edema and was found to be COVID-19 positive at the time. Reports being vaccinated against COVID-19.   Denies fever/chills. No chest pain, palpitations, abd pain, N/V/D. Has cough with sputum at baseline, denies any change in cough or sputum. Pt wheelchair bound at baseline.  In the ED VS were sig for a HR of 121 bpm. She was placed on BiPAP and then transitioned to NC  CT angio showed mild-mod pericardial effusion. ED spoke with CT surgery and they will evaluate  (2022 16:33)    Patient well known to heart failure team from multiple previous admissions. Patient reports progressively worsening SOB and LE edema x3 days, exacerbated by non compliance with low Na diet (Chinese take out food) the night before admission. Patient also noticed abdominal bloating, decreased appetite, and pain in legs while walking right before admission as well. Denies chest pain, palpitations, n/v, cough, or abnormal bleeding.       PAST MEDICAL & SURGICAL HISTORY:  Hypertension  Hyperlipidemia  Anxiety and depression  COPD, severe  CHF (congestive heart failure)  Cerebrovascular accident (CVA)Multiple  Type 2 diabetes mellitus  CKD (chronic kidney disease), stage II  No significant past surgical history        FAMILY HISTORY:  No pertinent family history of premature cardiovascular disease in first degree relatives.  Mother:  of cancer at 65  Father:  of an overdose at 50    SOCIAL HISTORY:    Current every day smoker, 2 cigarettes a day, denies alcohol or drug use    Allergies  No Known Allergies    Intolerances      PHYSICAL EXAM:  General Appearance: well appearing, normal for age and gender. 	  Neck: unable to assess due to body habitus  Eyes: Extra Ocular muscles intact.   Cardiovascular: regular rate and rhythm S1 S2, , No murmurs,+2 B/L pedal edema  Respiratory: B/L anterior expiratory wheezing, posterior lung fields clear  Psychiatry: Alert and oriented x 3, Mood & affect appropriate  Gastrointestinal:  Soft, Non-tender  Skin/Integumentary : No rashes, No ecchymoses, No cyanosis	  Neurologic: Non-focal  Musculoskeletal/ extremities: Normal range of motion, No clubbing, cyanosis   Vascular: Peripheral pulses palpable 2+ bilaterally    CARDIAC MARKERS:  Serum Pro-Brain Natriuretic Peptide: 4892 pg/mL (22 @ 10:50)      TELEMETRY EVENTS: 	    EC/6/22      Ventricular Rate 116 BPM  Atrial Rate 116 BPM  P-R Interval 148 ms  QRS Duration 148 ms  Q-T Interval 346 ms  QTC Calculation(Bazett) 480 ms  R Axis 162 degrees  T Axis -11 degrees    Diagnosis Line Sinus tachycardia  Right axis deviation  Non-specific intra-ventricular conduction delay  Abnormal ECG    Confirmed by Elenita Combs MD ([id number]) on 2022 4:06:55 PM        PREVIOUS DIAGNOSTIC TESTING:      TTE 22    Summary:   1. LV Ejection Fraction by Urena's Method with a biplane EF of 19 %.   2. Severely decreased global left ventricular systolic function.   3. Dilated RV with moderately reduced systolic function.   4. Moderately enlarged left atrium.   5. Severely enlarged right atrium.   6. Sclerotic aorticvalve with normal opening.   7. Mild aortic regurgitation.   8. The mitral valve leaflets are tethered due to reduced systolic   function and elevated LVDP.   9. Moderate-to-severe mitral regurgitation.  10. Severe tricuspid regurgitation.  11. Estimated pulmonary artery systolic pressure is 53.7 mmHg assuming a   right atrial pressure of 15 mmHg, which is consistent with moderate   pulmonary hypertension.  12. Small pericardial effusion adjacent to the right atrium.        TTE 21    Summary:   1. Left ventricular ejection fraction, by visual estimation, is 35 to 40%.   2.Moderately decreased global left ventricular systolic function.   3. Multiple left ventricular regional wall motion abnormalities exist. See wall motion findings.   4. Elevated left atrial and left ventricular end-diastolic pressures.   5. Mild concentric left ventricular hypertrophy.   6. Mildly increased LV wall thickness.   7. Normal left ventricular internal cavity size.   8. Spectral Doppler shows restrictive pattern of left ventricular myocardial filling (Grade III diastolic dysfunction).  9. Moderately reduced RV systolic function.  10. Mildly enlarged left atrium.  11. Moderately enlarged right atrium.  12. Small pericardial effusion.  13. Mild to moderate mitral valve regurgitation.  14. Moderate-severe tricuspid regurgitation.  15.Mild aortic regurgitation.  16. Sclerotic aortic valve with normal opening.  17. Estimated pulmonary artery systolic pressure is 50.0 mmHg assuming a right atrial pressure of 15 mmHg, which is consistent with moderate pulmonary hypertension.  18. Pulmonary hypertension is present.  19. LA volume Index is 36.7 ml/m² ml/m2.    	    Home Medications:  Albuterol (Eqv-ProAir HFA) 90 mcg/inh inhalation aerosol: 2 puff(s) inhaled every 6 hours, As Needed (2021 11:22)  aspirin 81 mg oral tablet: 1 tab(s) orally once a day (2021 11:22)  fenofibrate 145 mg oral tablet: 1 tab(s) orally once a day (2021 11:22)  glipiZIDE 10 mg oral tablet: 1 tab(s) orally 2 times a day (2021 11:22)  Lovaza 1000 mg oral capsule: 2 cap(s) orally 2 times a day (2021 14:52)  metFORMIN 1000 mg oral tablet: 1 tab(s) orally 2 times a day Do not take until  (2021 12:58)  Plavix 75 mg oral tablet: 1 tab(s) orally once a day (2021 11:22)  Vitamin D2 1.25 mg (50,000 intl units) oral capsule: 1 cap(s) orally once a week (2021 14:52)    MEDICATIONS  (STANDING):  aspirin  chewable 81 milliGRAM(s) Oral daily  atorvastatin 80 milliGRAM(s) Oral at bedtime  budesonide 160 MICROgram(s)/formoterol 4.5 MICROgram(s) Inhaler 2 Puff(s) Inhalation two times a day  chlorhexidine 4% Liquid 1 Application(s) Topical <User Schedule>  clopidogrel Tablet 75 milliGRAM(s) Oral daily  enoxaparin Injectable 40 milliGRAM(s) SubCutaneous daily  fenofibrate Tablet 145 milliGRAM(s) Oral daily  furosemide   Injectable 40 milliGRAM(s) IV Push two times a day  magnesium sulfate  IVPB 2 Gram(s) IV Intermittent once  metoprolol succinate ER 50 milliGRAM(s) Oral daily  omega-3-Acid Ethyl Esters 2 Gram(s) Oral two times a day  pantoprazole    Tablet 40 milliGRAM(s) Oral before breakfast  sacubitril 49 mG/valsartan 51 mG 1 Tablet(s) Oral two times a day    MEDICATIONS  (PRN):  ALBUTerol    90 MICROgram(s) HFA Inhaler 2 Puff(s) Inhalation every 6 hours PRN Shortness of Breath and/or Wheezing         Date of Admission: 22    Interval History:     - Patient resting in bed, in NAD  - Responding well to IV diuretics      HISTORY OF PRESENT ILLNESS: 57 YO F w/ PMHx of COPD (on 3L home O2), CORNELIUS on CPAP, HFrEF (15-20% on TTE 2021), severe pulmonary HTN, CAD s/p PCI to RCA, HTN/HLD, CVA with residual LLE weakness, T2DM, recently quit smoking (1 month ago) presented for shortness of breath that worsened last night. As per the patient she was noticing worsening LE swelling and increased SOB for the last few days. Last last she had chinese food and then her SOB worsened. Per EMS, patient was placed on NRB 10L and one albuterol treatment with improvement en route. Pt was recently admitted in 2021 for b/l lower extremity edema and was found to be COVID-19 positive at the time. Reports being vaccinated against COVID-19.   Denies fever/chills. No chest pain, palpitations, abd pain, N/V/D. Has cough with sputum at baseline, denies any change in cough or sputum. Pt wheelchair bound at baseline.  In the ED VS were sig for a HR of 121 bpm. She was placed on BiPAP and then transitioned to NC  CT angio showed mild-mod pericardial effusion. ED spoke with CT surgery and they will evaluate  (2022 16:33)    Patient well known to heart failure team from multiple previous admissions. Patient reports progressively worsening SOB and LE edema x3 days, exacerbated by non compliance with low Na diet (Chinese take out food) the night before admission. Patient also noticed abdominal bloating, decreased appetite, and pain in legs while walking right before admission as well. Denies chest pain, palpitations, n/v, cough, or abnormal bleeding.       PAST MEDICAL & SURGICAL HISTORY:  Hypertension  Hyperlipidemia  Anxiety and depression  COPD, severe  CHF (congestive heart failure)  Cerebrovascular accident (CVA)Multiple  Type 2 diabetes mellitus  CKD (chronic kidney disease), stage II  No significant past surgical history        FAMILY HISTORY:  No pertinent family history of premature cardiovascular disease in first degree relatives.  Mother:  of cancer at 65  Father:  of an overdose at 50    SOCIAL HISTORY:    Current every day smoker, 2 cigarettes a day, denies alcohol or drug use    Allergies  No Known Allergies    Intolerances      PHYSICAL EXAM:  General Appearance: well appearing, normal for age and gender. 	  Neck: unable to assess due to body habitus  Eyes: Extra Ocular muscles intact.   Cardiovascular: regular rate and rhythm S1 S2, , No murmurs,+2 B/L pedal edema  Respiratory: B/L anterior expiratory wheezing, posterior lung fields clear  Psychiatry: Alert and oriented x 3, Mood & affect appropriate  Gastrointestinal:  Soft, Non-tender  Skin/Integumentary : No rashes, No ecchymoses, No cyanosis	  Neurologic: Non-focal  Musculoskeletal/ extremities: Normal range of motion, No clubbing, cyanosis   Vascular: Peripheral pulses palpable 2+ bilaterally    CARDIAC MARKERS:  Serum Pro-Brain Natriuretic Peptide: 4892 pg/mL (22 @ 10:50)      TELEMETRY EVENTS: 	    EC/6/22      Ventricular Rate 116 BPM  Atrial Rate 116 BPM  P-R Interval 148 ms  QRS Duration 148 ms  Q-T Interval 346 ms  QTC Calculation(Bazett) 480 ms  R Axis 162 degrees  T Axis -11 degrees    Diagnosis Line Sinus tachycardia  Right axis deviation  Non-specific intra-ventricular conduction delay  Abnormal ECG    Confirmed by Elenita Combs MD ([id number]) on 2022 4:06:55 PM        PREVIOUS DIAGNOSTIC TESTING:      TTE 22    Summary:   1. LV Ejection Fraction by Urena's Method with a biplane EF of 19 %.   2. Severely decreased global left ventricular systolic function.   3. Dilated RV with moderately reduced systolic function.   4. Moderately enlarged left atrium.   5. Severely enlarged right atrium.   6. Sclerotic aorticvalve with normal opening.   7. Mild aortic regurgitation.   8. The mitral valve leaflets are tethered due to reduced systolic   function and elevated LVDP.   9. Moderate-to-severe mitral regurgitation.  10. Severe tricuspid regurgitation.  11. Estimated pulmonary artery systolic pressure is 53.7 mmHg assuming a   right atrial pressure of 15 mmHg, which is consistent with moderate   pulmonary hypertension.  12. Small pericardial effusion adjacent to the right atrium.        TTE 21    Summary:   1. Left ventricular ejection fraction, by visual estimation, is 35 to 40%.   2.Moderately decreased global left ventricular systolic function.   3. Multiple left ventricular regional wall motion abnormalities exist. See wall motion findings.   4. Elevated left atrial and left ventricular end-diastolic pressures.   5. Mild concentric left ventricular hypertrophy.   6. Mildly increased LV wall thickness.   7. Normal left ventricular internal cavity size.   8. Spectral Doppler shows restrictive pattern of left ventricular myocardial filling (Grade III diastolic dysfunction).  9. Moderately reduced RV systolic function.  10. Mildly enlarged left atrium.  11. Moderately enlarged right atrium.  12. Small pericardial effusion.  13. Mild to moderate mitral valve regurgitation.  14. Moderate-severe tricuspid regurgitation.  15.Mild aortic regurgitation.  16. Sclerotic aortic valve with normal opening.  17. Estimated pulmonary artery systolic pressure is 50.0 mmHg assuming a right atrial pressure of 15 mmHg, which is consistent with moderate pulmonary hypertension.  18. Pulmonary hypertension is present.  19. LA volume Index is 36.7 ml/m² ml/m2.    	    Home Medications:  Albuterol (Eqv-ProAir HFA) 90 mcg/inh inhalation aerosol: 2 puff(s) inhaled every 6 hours, As Needed (2021 11:22)  aspirin 81 mg oral tablet: 1 tab(s) orally once a day (2021 11:22)  fenofibrate 145 mg oral tablet: 1 tab(s) orally once a day (2021 11:22)  glipiZIDE 10 mg oral tablet: 1 tab(s) orally 2 times a day (2021 11:22)  Lovaza 1000 mg oral capsule: 2 cap(s) orally 2 times a day (2021 14:52)  metFORMIN 1000 mg oral tablet: 1 tab(s) orally 2 times a day Do not take until  (2021 12:58)  Plavix 75 mg oral tablet: 1 tab(s) orally once a day (2021 11:22)  Vitamin D2 1.25 mg (50,000 intl units) oral capsule: 1 cap(s) orally once a week (2021 14:52)    MEDICATIONS  (STANDING):  aspirin  chewable 81 milliGRAM(s) Oral daily  atorvastatin 80 milliGRAM(s) Oral at bedtime  budesonide 160 MICROgram(s)/formoterol 4.5 MICROgram(s) Inhaler 2 Puff(s) Inhalation two times a day  chlorhexidine 4% Liquid 1 Application(s) Topical <User Schedule>  clopidogrel Tablet 75 milliGRAM(s) Oral daily  enoxaparin Injectable 40 milliGRAM(s) SubCutaneous daily  fenofibrate Tablet 145 milliGRAM(s) Oral daily  furosemide   Injectable 40 milliGRAM(s) IV Push two times a day  magnesium sulfate  IVPB 2 Gram(s) IV Intermittent once  metoprolol succinate ER 50 milliGRAM(s) Oral daily  omega-3-Acid Ethyl Esters 2 Gram(s) Oral two times a day  pantoprazole    Tablet 40 milliGRAM(s) Oral before breakfast  sacubitril 49 mG/valsartan 51 mG 1 Tablet(s) Oral two times a day    MEDICATIONS  (PRN):  ALBUTerol    90 MICROgram(s) HFA Inhaler 2 Puff(s) Inhalation every 6 hours PRN Shortness of Breath and/or Wheezing

## 2022-02-11 NOTE — PROGRESS NOTE ADULT - SUBJECTIVE AND OBJECTIVE BOX
SUBJECTIVE:    Patient is a 56y old Female who presents with a chief complaint of Shortness of breath (11 Feb 2022 03:06)    Currently admitted to medicine with the primary diagnosis of Pericardial effusion       Today is hospital day 5d. This morning she is resting comfortably in bed and reports no new issues or overnight events.     PAST MEDICAL & SURGICAL HISTORY  Hypertension    Hyperlipidemia    Anxiety and depression    COPD, severe    CHF (congestive heart failure)    Cerebrovascular accident (CVA)  Multiple    Type 2 diabetes mellitus    CKD (chronic kidney disease), stage II    No significant past surgical history      SOCIAL HISTORY:  Negative for smoking/alcohol/drug use.     ALLERGIES:  No Known Allergies    MEDICATIONS:  STANDING MEDICATIONS  aspirin enteric coated 81 milliGRAM(s) Oral daily  atorvastatin 80 milliGRAM(s) Oral at bedtime  budesonide 160 MICROgram(s)/formoterol 4.5 MICROgram(s) Inhaler 2 Puff(s) Inhalation two times a day  buMETAnide Infusion 1 mG/Hr IV Continuous <Continuous>  clopidogrel Tablet 75 milliGRAM(s) Oral daily  enoxaparin Injectable 40 milliGRAM(s) SubCutaneous daily  fenofibrate Tablet 145 milliGRAM(s) Oral daily  influenza   Vaccine 0.5 milliLiter(s) IntraMuscular once  insulin lispro (ADMELOG) corrective regimen sliding scale   SubCutaneous Before meals and at bedtime  magnesium oxide 400 milliGRAM(s) Oral three times a day with meals  metoprolol succinate ER 50 milliGRAM(s) Oral daily  pantoprazole    Tablet 40 milliGRAM(s) Oral before breakfast  QUEtiapine 25 milliGRAM(s) Oral at bedtime  sacubitril 49 mG/valsartan 51 mG 1 Tablet(s) Oral two times a day  spironolactone 25 milliGRAM(s) Oral daily    PRN MEDICATIONS  ALBUTerol    90 MICROgram(s) HFA Inhaler 2 Puff(s) Inhalation every 6 hours PRN  albuterol/ipratropium for Nebulization 3 milliLiter(s) Nebulizer every 6 hours PRN  loperamide 2 milliGRAM(s) Oral three times a day PRN    VITALS:   T(F): 96.9  HR: 73  BP: 115/69  RR: 18  SpO2: --    LABS:                        13.7   4.90  )-----------( 258      ( 11 Feb 2022 04:30 )             42.8     02-11    138  |  91<L>  |  17  ----------------------------<  174<H>  3.3<L>   |  30  |  1.2    Ca    8.7      11 Feb 2022 04:30  Mg     1.5     02-11    TPro  6.7  /  Alb  3.5  /  TBili  1.7<H>  /  DBili  x   /  AST  23  /  ALT  11  /  AlkPhos  131<H>  02-10              CARDIAC MARKERS ( 10 Feb 2022 06:14 )  x     / <0.01 ng/mL / x     / x     / 1.4 ng/mL      RADIOLOGY:    PHYSICAL EXAM:  GEN: No acute distress  LUNGS: Clear to auscultation bilaterally   HEART: Regular  ABD: Soft, non-tender, non-distended.  EXT: NC/NC/NE/2+PP/ROLDAN/Skin Intact.   NEURO: AAOX3    Intravenous access:   NG tube:   Wilkerson Catheter:        SUBJECTIVE:    Patient is a 56y old Female who presents with a chief complaint of Shortness of breath (11 Feb 2022 03:06)    Currently admitted to medicine with the primary diagnosis of Pericardial effusion    Today is hospital day 5d. This morning she is resting comfortably in bed and reports no new issues or overnight events.     ROS no cp, no sob     PAST MEDICAL & SURGICAL HISTORY  Hypertension    Hyperlipidemia    Anxiety and depression    COPD, severe    CHF (congestive heart failure)    Cerebrovascular accident (CVA)  Multiple    Type 2 diabetes mellitus    CKD (chronic kidney disease), stage II    No significant past surgical history      SOCIAL HISTORY:  Negative for smoking/alcohol/drug use.     ALLERGIES:  No Known Allergies    MEDICATIONS:  STANDING MEDICATIONS  aspirin enteric coated 81 milliGRAM(s) Oral daily  atorvastatin 80 milliGRAM(s) Oral at bedtime  budesonide 160 MICROgram(s)/formoterol 4.5 MICROgram(s) Inhaler 2 Puff(s) Inhalation two times a day  buMETAnide Infusion 1 mG/Hr IV Continuous <Continuous>  clopidogrel Tablet 75 milliGRAM(s) Oral daily  enoxaparin Injectable 40 milliGRAM(s) SubCutaneous daily  fenofibrate Tablet 145 milliGRAM(s) Oral daily  influenza   Vaccine 0.5 milliLiter(s) IntraMuscular once  insulin lispro (ADMELOG) corrective regimen sliding scale   SubCutaneous Before meals and at bedtime  magnesium oxide 400 milliGRAM(s) Oral three times a day with meals  metoprolol succinate ER 50 milliGRAM(s) Oral daily  pantoprazole    Tablet 40 milliGRAM(s) Oral before breakfast  QUEtiapine 25 milliGRAM(s) Oral at bedtime  sacubitril 49 mG/valsartan 51 mG 1 Tablet(s) Oral two times a day  spironolactone 25 milliGRAM(s) Oral daily    PRN MEDICATIONS  ALBUTerol    90 MICROgram(s) HFA Inhaler 2 Puff(s) Inhalation every 6 hours PRN  albuterol/ipratropium for Nebulization 3 milliLiter(s) Nebulizer every 6 hours PRN  loperamide 2 milliGRAM(s) Oral three times a day PRN    VITALS:   T(F): 96.9  HR: 73  BP: 115/69  RR: 18  SpO2: --    LABS:                        13.7   4.90  )-----------( 258      ( 11 Feb 2022 04:30 )             42.8     02-11    138  |  91<L>  |  17  ----------------------------<  174<H>  3.3<L>   |  30  |  1.2    Ca    8.7      11 Feb 2022 04:30  Mg     1.5     02-11    TPro  6.7  /  Alb  3.5  /  TBili  1.7<H>  /  DBili  x   /  AST  23  /  ALT  11  /  AlkPhos  131<H>  02-10              CARDIAC MARKERS ( 10 Feb 2022 06:14 )  x     / <0.01 ng/mL / x     / x     / 1.4 ng/mL      RADIOLOGY:    PHYSICAL EXAM:  GEN: No acute distress  LUNGS: Clear to auscultation bilaterally   HEART: Regular  ABD: Soft, non-tender, non-distended.  EXT: NC/NC/NE/2+PP/ROLDAN/Skin Intact.   NEURO: AAOX3    Intravenous access:   NG tube:   Wilkerson Catheter:

## 2022-02-11 NOTE — CONSULT NOTE ADULT - ASSESSMENT
ASSESSMENT:  57 YO F w/ PMHx of COPD (on 3L home O2), CORNELIUS on CPAP, HFrEF (15-20% on TTE 11/2021), severe pulmonary HTN, CAD s/p PCI to RCA, HTN/HLD, CVA with residual LLE weakness, T2DM, recently quit smoking (1 month ago) presented for shortness of breath, being treated for CHF exacerbation. Vascular surgery was consulted duet to arterial duplex showing occlusion of the bilateral femoral arteries  On physical exam RLE dopplerable dp, no pt. LLE unable to doppler dp/pt. foot are warm, normal in color, no skin ulcerations, good capillary refill. good motor. decreased sensory on the left due since the stroke      PLAN:  - f/u arterial duplex study  - vascular surgery to review the arterial duplex and give further recommendations after     Above plan discussed with vascular fellow Dr Alonso  02-11-22 @ 03:07

## 2022-02-12 LAB
ANION GAP SERPL CALC-SCNC: 16 MMOL/L — HIGH (ref 7–14)
BASOPHILS # BLD AUTO: 0.07 K/UL — SIGNIFICANT CHANGE UP (ref 0–0.2)
BASOPHILS NFR BLD AUTO: 1.4 % — HIGH (ref 0–1)
BUN SERPL-MCNC: 19 MG/DL — SIGNIFICANT CHANGE UP (ref 10–20)
CALCIUM SERPL-MCNC: 9 MG/DL — SIGNIFICANT CHANGE UP (ref 8.5–10.1)
CHLORIDE SERPL-SCNC: 96 MMOL/L — LOW (ref 98–110)
CO2 SERPL-SCNC: 33 MMOL/L — HIGH (ref 17–32)
CREAT SERPL-MCNC: 1.2 MG/DL — SIGNIFICANT CHANGE UP (ref 0.7–1.5)
EOSINOPHIL # BLD AUTO: 0.1 K/UL — SIGNIFICANT CHANGE UP (ref 0–0.7)
EOSINOPHIL NFR BLD AUTO: 1.9 % — SIGNIFICANT CHANGE UP (ref 0–8)
GLUCOSE BLDC GLUCOMTR-MCNC: 120 MG/DL — HIGH (ref 70–99)
GLUCOSE BLDC GLUCOMTR-MCNC: 133 MG/DL — HIGH (ref 70–99)
GLUCOSE BLDC GLUCOMTR-MCNC: 143 MG/DL — HIGH (ref 70–99)
GLUCOSE BLDC GLUCOMTR-MCNC: 148 MG/DL — HIGH (ref 70–99)
GLUCOSE BLDC GLUCOMTR-MCNC: 207 MG/DL — HIGH (ref 70–99)
GLUCOSE SERPL-MCNC: 199 MG/DL — HIGH (ref 70–99)
HCT VFR BLD CALC: 42.1 % — SIGNIFICANT CHANGE UP (ref 37–47)
HGB BLD-MCNC: 13.1 G/DL — SIGNIFICANT CHANGE UP (ref 12–16)
IMM GRANULOCYTES NFR BLD AUTO: 0.4 % — HIGH (ref 0.1–0.3)
LYMPHOCYTES # BLD AUTO: 1.19 K/UL — LOW (ref 1.2–3.4)
LYMPHOCYTES # BLD AUTO: 23 % — SIGNIFICANT CHANGE UP (ref 20.5–51.1)
MAGNESIUM SERPL-MCNC: 1.5 MG/DL — LOW (ref 1.8–2.4)
MCHC RBC-ENTMCNC: 28.3 PG — SIGNIFICANT CHANGE UP (ref 27–31)
MCHC RBC-ENTMCNC: 31.1 G/DL — LOW (ref 32–37)
MCV RBC AUTO: 90.9 FL — SIGNIFICANT CHANGE UP (ref 81–99)
MONOCYTES # BLD AUTO: 0.34 K/UL — SIGNIFICANT CHANGE UP (ref 0.1–0.6)
MONOCYTES NFR BLD AUTO: 6.6 % — SIGNIFICANT CHANGE UP (ref 1.7–9.3)
NEUTROPHILS # BLD AUTO: 3.45 K/UL — SIGNIFICANT CHANGE UP (ref 1.4–6.5)
NEUTROPHILS NFR BLD AUTO: 66.7 % — SIGNIFICANT CHANGE UP (ref 42.2–75.2)
NRBC # BLD: 0 /100 WBCS — SIGNIFICANT CHANGE UP (ref 0–0)
PLATELET # BLD AUTO: 253 K/UL — SIGNIFICANT CHANGE UP (ref 130–400)
POTASSIUM SERPL-MCNC: 4 MMOL/L — SIGNIFICANT CHANGE UP (ref 3.5–5)
POTASSIUM SERPL-SCNC: 4 MMOL/L — SIGNIFICANT CHANGE UP (ref 3.5–5)
RBC # BLD: 4.63 M/UL — SIGNIFICANT CHANGE UP (ref 4.2–5.4)
RBC # FLD: 21.8 % — HIGH (ref 11.5–14.5)
SODIUM SERPL-SCNC: 145 MMOL/L — SIGNIFICANT CHANGE UP (ref 135–146)
WBC # BLD: 5.17 K/UL — SIGNIFICANT CHANGE UP (ref 4.8–10.8)
WBC # FLD AUTO: 5.17 K/UL — SIGNIFICANT CHANGE UP (ref 4.8–10.8)

## 2022-02-12 PROCEDURE — 99233 SBSQ HOSP IP/OBS HIGH 50: CPT

## 2022-02-12 RX ORDER — MAGNESIUM SULFATE 500 MG/ML
2 VIAL (ML) INJECTION ONCE
Refills: 0 | Status: COMPLETED | OUTPATIENT
Start: 2022-02-12 | End: 2022-02-12

## 2022-02-12 RX ADMIN — SPIRONOLACTONE 25 MILLIGRAM(S): 25 TABLET, FILM COATED ORAL at 05:28

## 2022-02-12 RX ADMIN — CLOPIDOGREL BISULFATE 75 MILLIGRAM(S): 75 TABLET, FILM COATED ORAL at 12:10

## 2022-02-12 RX ADMIN — SACUBITRIL AND VALSARTAN 1 TABLET(S): 24; 26 TABLET, FILM COATED ORAL at 05:28

## 2022-02-12 RX ADMIN — MAGNESIUM OXIDE 400 MG ORAL TABLET 400 MILLIGRAM(S): 241.3 TABLET ORAL at 13:09

## 2022-02-12 RX ADMIN — BUDESONIDE AND FORMOTEROL FUMARATE DIHYDRATE 2 PUFF(S): 160; 4.5 AEROSOL RESPIRATORY (INHALATION) at 08:16

## 2022-02-12 RX ADMIN — BUDESONIDE AND FORMOTEROL FUMARATE DIHYDRATE 2 PUFF(S): 160; 4.5 AEROSOL RESPIRATORY (INHALATION) at 21:54

## 2022-02-12 RX ADMIN — SACUBITRIL AND VALSARTAN 1 TABLET(S): 24; 26 TABLET, FILM COATED ORAL at 17:26

## 2022-02-12 RX ADMIN — PANTOPRAZOLE SODIUM 40 MILLIGRAM(S): 20 TABLET, DELAYED RELEASE ORAL at 05:25

## 2022-02-12 RX ADMIN — MAGNESIUM OXIDE 400 MG ORAL TABLET 400 MILLIGRAM(S): 241.3 TABLET ORAL at 17:26

## 2022-02-12 RX ADMIN — Medication 81 MILLIGRAM(S): at 12:11

## 2022-02-12 RX ADMIN — QUETIAPINE FUMARATE 25 MILLIGRAM(S): 200 TABLET, FILM COATED ORAL at 21:54

## 2022-02-12 RX ADMIN — Medication 2: at 12:09

## 2022-02-12 RX ADMIN — Medication 50 MILLIGRAM(S): at 05:27

## 2022-02-12 RX ADMIN — ATORVASTATIN CALCIUM 80 MILLIGRAM(S): 80 TABLET, FILM COATED ORAL at 21:54

## 2022-02-12 RX ADMIN — Medication 145 MILLIGRAM(S): at 12:10

## 2022-02-12 RX ADMIN — MAGNESIUM OXIDE 400 MG ORAL TABLET 400 MILLIGRAM(S): 241.3 TABLET ORAL at 08:16

## 2022-02-12 RX ADMIN — ENOXAPARIN SODIUM 40 MILLIGRAM(S): 100 INJECTION SUBCUTANEOUS at 12:09

## 2022-02-12 NOTE — PROGRESS NOTE ADULT - ASSESSMENT
55 YO F w/ PMHx of COPD (on 3L home O2), CORNELIUS on CPAP, HFrEF (15-20% on TTE 11/2021), severe pulmonary HTN, CAD s/p PCI to RCA, HTN/HLD, CVA with residual LLE weakness, T2DM, recently quit smoking (1 month ago) presented for shortness of breath that worsened last night.     #Acute CHF exacerbation  #Hx of HFrEF; BNP around baseline   #Hx of COPD on 3L home O2  #Hx of severe pul-HTN  #Mild-mod pericardial effusion; low probability of tamponade    - CT Angio Chest PE Protocol w/ IV Cont: No PE. Cardiomegaly with asymmetric right heart dilatation. Mild/moderate pericardial effusion, slightly increased since prior. Small bilateral pleural effusions, slightly increased since prior.   - TTE 11/14/2021: EF 15-20%, G2DD, mod MR, mild-mod TR, severe P-HTN, small pericardial effusion.  - Echo 02/06: 19% EF, small pericardial effusion, moderate p-HTN, mod-severe MR  - Troponin 0.02; around baseline.  - Daily weight. Strict I & Os. Keep I < O. Fluid restriction  - Continue Metoprolol 50mg PO QD, Entresto 49-51 BID and spironolactone 25mg PO QD  - No intervention per CTS  - c/w bumex gtt on 2/8, BMP BID  - f/u with HF team    # PAD  - Duplex showed b/l SFA occlusion  - c/w ASA, plavix and lipitor  - No acute surgical intervention as per vascular  - outpt follow up with vascular in 2-4 weeks    #CAD s/p PCI to RCA - Continue ASA 81mg PO QD, Plavix 75mg PO QD, Metoprolol 50mg PO QD    #CORNELIUS on CPAP - CPAP at night     #Hx of CVA with residual weakness   #HTN/DLD  - Continue atorvastatin 80mg PO QD, ASA 81mg PO QD, Plavix 75mg PO QD, fenofibrate 145mg PO QD    #Diabetes Mellitus type II   - Last HbA1C 7.7 % (11-10-21)  - c/w insulin    #Misc  - DVT Prophylaxis: Lovenox  - Diet: DASH/TLC, CC, fluid restriction  - GI Prophylaxis: pantoprazole   - Activity: AAT  - Code Status: Full Code  Dispo acute

## 2022-02-12 NOTE — PROGRESS NOTE ADULT - SUBJECTIVE AND OBJECTIVE BOX
SUBJECTIVE:    Patient is a 56y old Female who presents with a chief complaint of Shortness of breath (12 Feb 2022 12:39)    Currently admitted to medicine with the primary diagnosis of Pericardial effusion       Today is hospital day 6d. This morning she is resting comfortably in bed and reports no new issues or overnight events.     PAST MEDICAL & SURGICAL HISTORY  Hypertension    Hyperlipidemia    Anxiety and depression    COPD, severe    CHF (congestive heart failure)    Cerebrovascular accident (CVA)  Multiple    Type 2 diabetes mellitus    CKD (chronic kidney disease), stage II    No significant past surgical history      SOCIAL HISTORY:  Negative for smoking/alcohol/drug use.     ALLERGIES:  No Known Allergies    MEDICATIONS:  STANDING MEDICATIONS  aspirin enteric coated 81 milliGRAM(s) Oral daily  atorvastatin 80 milliGRAM(s) Oral at bedtime  budesonide 160 MICROgram(s)/formoterol 4.5 MICROgram(s) Inhaler 2 Puff(s) Inhalation two times a day  buMETAnide Infusion 1 mG/Hr IV Continuous <Continuous>  clopidogrel Tablet 75 milliGRAM(s) Oral daily  enoxaparin Injectable 40 milliGRAM(s) SubCutaneous daily  fenofibrate Tablet 145 milliGRAM(s) Oral daily  influenza   Vaccine 0.5 milliLiter(s) IntraMuscular once  insulin lispro (ADMELOG) corrective regimen sliding scale   SubCutaneous Before meals and at bedtime  magnesium oxide 400 milliGRAM(s) Oral three times a day with meals  metoprolol succinate ER 50 milliGRAM(s) Oral daily  pantoprazole    Tablet 40 milliGRAM(s) Oral before breakfast  QUEtiapine 25 milliGRAM(s) Oral at bedtime  sacubitril 49 mG/valsartan 51 mG 1 Tablet(s) Oral two times a day  spironolactone 25 milliGRAM(s) Oral daily    PRN MEDICATIONS  ALBUTerol    90 MICROgram(s) HFA Inhaler 2 Puff(s) Inhalation every 6 hours PRN  albuterol/ipratropium for Nebulization 3 milliLiter(s) Nebulizer every 6 hours PRN  loperamide 2 milliGRAM(s) Oral three times a day PRN    VITALS:   T(F): 97.2  HR: 99  BP: 113/73  RR: 18  SpO2: 100%    LABS:                        13.1   5.17  )-----------( 253      ( 12 Feb 2022 11:00 )             42.1     02-11    138  |  91<L>  |  17  ----------------------------<  174<H>  3.3<L>   |  30  |  1.2    Ca    8.7      11 Feb 2022 04:30  Mg     1.5     02-11                GI PCR Panel, Stool (collected 10 Feb 2022 22:18)  Source: .Stool Feces  Final Report (11 Feb 2022 15:09):    GI PCR Results: NOT detected    *******Please Note:*******    GI panel PCR evaluates for:    Campylobacter, Plesiomonas shigelloides, Salmonella,    Vibrio, Yersinia enterocolitica, Enteroaggregative    Escherichia coli (EAEC), Enteropathogenic E.coli (EPEC),    Enterotoxigenic E. coli (ETEC) lt/st, Shiga-like    toxin-producing E. coli (STEC) stx1/stx2,    Shigella/ Enteroinvasive E. coli (EIEC), Cryptosporidium,    Cyclospora cayetanensis, Entamoeba histolytica,    Giardia lamblia, Adenovirus F 40/41, Astrovirus,    Norovirus GI/GII, Rotavirus A, Sapovirus          RADIOLOGY:    PHYSICAL EXAM:  GEN: No acute distress  LUNGS: Clear to auscultation bilaterally   HEART: Regular  ABD: Soft, non-tender, non-distended.  EXT: NC/NC/NE/2+PP/ROLDAN/Skin Intact.   NEURO: AAOX3    Intravenous access:   NG tube:   Wilkerson Catheter:

## 2022-02-12 NOTE — PROGRESS NOTE ADULT - ASSESSMENT
57 YO F w/ PMHx of COPD (on 3L home O2), CORNELIUS on CPAP, HFrEF (15-20% on TTE 11/2021), severe pulmonary HTN, CAD s/p PCI to RCA, HTN/HLD, CVA with residual LLE weakness, T2DM, recently quit smoking (1 month ago) presented for shortness of breath that worsened last night.     #Acute on chronic Systolic  CHF   #Hx of COPD on 3L home O2  #Hx of severe pul-HTN  #Mild-mod pericardial effusion; low probability of tamponade    - CT Angio Chest PE Protocol w/ IV Cont: No PE. Cardiomegaly with asymmetric right heart dilatation. Mild/moderate pericardial effusion, slightly increased since prior. Small bilateral pleural effusions, slightly increased since prior.   - TTE 11/14/2021: EF 15-20%, G2DD, mod MR, mild-mod TR, severe P-HTN, small pericardial effusion.  - Echo 02/06: 19% EF, small pericardial effusion, moderate p-HTN, mod-severe MR  - Troponin 0.02; around baseline.  - Daily weight. Strict I & Os. Keep I < O. Fluid restriction  - Continue Metoprolol 50mg PO QD, Entresto 49-51 BID and spironolactone 25mg PO QD  - No intervention per CTS  - c/w bumex gtt on 2/8, BMP BID  - f/u with HF team    # PAD  - duplex reported over the phone with extensive PAD  - f/u official report  - vascular following    #CAD s/p PCI to RCA - Continue ASA 81mg PO QD, Plavix 75mg PO QD, Metoprolol 50mg PO QD    #CORNELIUS on CPAP - CPAP at night     #Hx of CVA with residual weakness   #HTN/DLD  - Continue atorvastatin 80mg PO QD, ASA 81mg PO QD, Plavix 75mg PO QD, fenofibrate 145mg PO QD    #Diabetes Mellitus type II   - Last HbA1C 7.7 % (11-10-21)  - Will hold home medications  - Monitor FS. Start insulin if FS > 180. Keep between 140 - 180. Carb consistent diet    #Misc  - DVT Prophylaxis: Lovenox  - Diet: DASH/TLC, CC, fluid restriction  - GI Prophylaxis: pantoprazole   - Activity: AAT  - Code Status: Full Code    #Progress Note Handoff  Pending (specify):  Bumex Drip/ Clinical improvement / ? PT  Disposition: Home ( Wheel chair bound )

## 2022-02-12 NOTE — PROGRESS NOTE ADULT - SUBJECTIVE AND OBJECTIVE BOX
WILLIAN MARY  56y  Female      Patient is a 56y old  Female who presents with a chief complaint of Shortness of breath.       INTERVAL HPI/OVERNIGHT EVENTS:      ******************************* REVIEW OF SYSTEMS:**********************************************    All other review of systems negative    *********************** VITALS ******************************************    T(F): 97.2 (02-12-22 @ 05:00)  HR: 99 (02-12-22 @ 08:40) (91 - 99)  BP: 113/73 (02-12-22 @ 05:00) (113/73 - 138/69)  RR: 18 (02-12-22 @ 08:40) (18 - 18)  SpO2: 100% (02-12-22 @ 08:40) (100% - 100%)    02-11-22 @ 07:01  -  02-12-22 @ 07:00  --------------------------------------------------------  IN: 50 mL / OUT: 2300 mL / NET: -2250 mL            02-11-22 @ 07:01  -  02-12-22 @ 07:00  --------------------------------------------------------  IN: 50 mL / OUT: 2300 mL / NET: -2250 mL        ******************************** PHYSICAL EXAM:**************************************************  GENERAL: NAD    PSYCH: no agitation, baseline mentation  HEENT:     NERVOUS SYSTEM:  Alert & Oriented X3,     PULMONARY: SHAD, CTA    CARDIOVASCULAR: S1S2 RRR    GI: Soft, NT, ND; BS present.    EXTREMITIES: Improving LE edema   LYMPH: No lymphadenopathy noted    SKIN: No rashes or lesions      **************************** LABS *******************************************************                          13.7   4.90  )-----------( 258      ( 11 Feb 2022 04:30 )             42.8     02-11    138  |  91<L>  |  17  ----------------------------<  174<H>  3.3<L>   |  30  |  1.2    Ca    8.7      11 Feb 2022 04:30  Mg     1.5     02-11            Lactate Trend        CAPILLARY BLOOD GLUCOSE      POCT Blood Glucose.: 207 mg/dL (12 Feb 2022 11:31)          **************************Active Medications *******************************************  No Known Allergies      ALBUTerol    90 MICROgram(s) HFA Inhaler 2 Puff(s) Inhalation every 6 hours PRN  albuterol/ipratropium for Nebulization 3 milliLiter(s) Nebulizer every 6 hours PRN  aspirin enteric coated 81 milliGRAM(s) Oral daily  atorvastatin 80 milliGRAM(s) Oral at bedtime  budesonide 160 MICROgram(s)/formoterol 4.5 MICROgram(s) Inhaler 2 Puff(s) Inhalation two times a day  buMETAnide Infusion 1 mG/Hr IV Continuous <Continuous>  clopidogrel Tablet 75 milliGRAM(s) Oral daily  enoxaparin Injectable 40 milliGRAM(s) SubCutaneous daily  fenofibrate Tablet 145 milliGRAM(s) Oral daily  influenza   Vaccine 0.5 milliLiter(s) IntraMuscular once  insulin lispro (ADMELOG) corrective regimen sliding scale   SubCutaneous Before meals and at bedtime  loperamide 2 milliGRAM(s) Oral three times a day PRN  magnesium oxide 400 milliGRAM(s) Oral three times a day with meals  metoprolol succinate ER 50 milliGRAM(s) Oral daily  pantoprazole    Tablet 40 milliGRAM(s) Oral before breakfast  QUEtiapine 25 milliGRAM(s) Oral at bedtime  sacubitril 49 mG/valsartan 51 mG 1 Tablet(s) Oral two times a day  spironolactone 25 milliGRAM(s) Oral daily      ***************************************************  RADIOLOGY & ADDITIONAL TESTS:    Imaging Personally Reviewed:  [ ] YES  [ ] NO    HEALTH ISSUES - PROBLEM Dx:  CAD (coronary artery disease)    Acute on chronic respiratory failure with hypoxia and hypercapnia    HTN (hypertension)    HLD (hyperlipidemia)

## 2022-02-13 LAB
ANION GAP SERPL CALC-SCNC: 15 MMOL/L — HIGH (ref 7–14)
BASOPHILS # BLD AUTO: 0.08 K/UL — SIGNIFICANT CHANGE UP (ref 0–0.2)
BASOPHILS NFR BLD AUTO: 1.5 % — HIGH (ref 0–1)
BUN SERPL-MCNC: 18 MG/DL — SIGNIFICANT CHANGE UP (ref 10–20)
CALCIUM SERPL-MCNC: 9 MG/DL — SIGNIFICANT CHANGE UP (ref 8.5–10.1)
CHLORIDE SERPL-SCNC: 93 MMOL/L — LOW (ref 98–110)
CO2 SERPL-SCNC: 32 MMOL/L — SIGNIFICANT CHANGE UP (ref 17–32)
CREAT SERPL-MCNC: 1.2 MG/DL — SIGNIFICANT CHANGE UP (ref 0.7–1.5)
EOSINOPHIL # BLD AUTO: 0.13 K/UL — SIGNIFICANT CHANGE UP (ref 0–0.7)
EOSINOPHIL NFR BLD AUTO: 2.5 % — SIGNIFICANT CHANGE UP (ref 0–8)
GLUCOSE BLDC GLUCOMTR-MCNC: 114 MG/DL — HIGH (ref 70–99)
GLUCOSE BLDC GLUCOMTR-MCNC: 143 MG/DL — HIGH (ref 70–99)
GLUCOSE BLDC GLUCOMTR-MCNC: 146 MG/DL — HIGH (ref 70–99)
GLUCOSE BLDC GLUCOMTR-MCNC: 199 MG/DL — HIGH (ref 70–99)
GLUCOSE SERPL-MCNC: 134 MG/DL — HIGH (ref 70–99)
HCT VFR BLD CALC: 44 % — SIGNIFICANT CHANGE UP (ref 37–47)
HGB BLD-MCNC: 13.8 G/DL — SIGNIFICANT CHANGE UP (ref 12–16)
IMM GRANULOCYTES NFR BLD AUTO: 0.2 % — SIGNIFICANT CHANGE UP (ref 0.1–0.3)
LYMPHOCYTES # BLD AUTO: 1.39 K/UL — SIGNIFICANT CHANGE UP (ref 1.2–3.4)
LYMPHOCYTES # BLD AUTO: 26.7 % — SIGNIFICANT CHANGE UP (ref 20.5–51.1)
MAGNESIUM SERPL-MCNC: 1.6 MG/DL — LOW (ref 1.8–2.4)
MCHC RBC-ENTMCNC: 28.3 PG — SIGNIFICANT CHANGE UP (ref 27–31)
MCHC RBC-ENTMCNC: 31.4 G/DL — LOW (ref 32–37)
MCV RBC AUTO: 90.3 FL — SIGNIFICANT CHANGE UP (ref 81–99)
MONOCYTES # BLD AUTO: 0.38 K/UL — SIGNIFICANT CHANGE UP (ref 0.1–0.6)
MONOCYTES NFR BLD AUTO: 7.3 % — SIGNIFICANT CHANGE UP (ref 1.7–9.3)
NEUTROPHILS # BLD AUTO: 3.21 K/UL — SIGNIFICANT CHANGE UP (ref 1.4–6.5)
NEUTROPHILS NFR BLD AUTO: 61.8 % — SIGNIFICANT CHANGE UP (ref 42.2–75.2)
NRBC # BLD: 0 /100 WBCS — SIGNIFICANT CHANGE UP (ref 0–0)
PLATELET # BLD AUTO: 219 K/UL — SIGNIFICANT CHANGE UP (ref 130–400)
POTASSIUM SERPL-MCNC: 4.1 MMOL/L — SIGNIFICANT CHANGE UP (ref 3.5–5)
POTASSIUM SERPL-SCNC: 4.1 MMOL/L — SIGNIFICANT CHANGE UP (ref 3.5–5)
RBC # BLD: 4.87 M/UL — SIGNIFICANT CHANGE UP (ref 4.2–5.4)
RBC # FLD: 21.4 % — HIGH (ref 11.5–14.5)
SARS-COV-2 RNA SPEC QL NAA+PROBE: DETECTED
SODIUM SERPL-SCNC: 140 MMOL/L — SIGNIFICANT CHANGE UP (ref 135–146)
WBC # BLD: 5.2 K/UL — SIGNIFICANT CHANGE UP (ref 4.8–10.8)
WBC # FLD AUTO: 5.2 K/UL — SIGNIFICANT CHANGE UP (ref 4.8–10.8)

## 2022-02-13 PROCEDURE — 99233 SBSQ HOSP IP/OBS HIGH 50: CPT

## 2022-02-13 RX ORDER — MAGNESIUM SULFATE 500 MG/ML
2 VIAL (ML) INJECTION ONCE
Refills: 0 | Status: COMPLETED | OUTPATIENT
Start: 2022-02-13 | End: 2022-02-13

## 2022-02-13 RX ADMIN — BUMETANIDE 5 MG/HR: 0.25 INJECTION INTRAMUSCULAR; INTRAVENOUS at 11:46

## 2022-02-13 RX ADMIN — MAGNESIUM OXIDE 400 MG ORAL TABLET 400 MILLIGRAM(S): 241.3 TABLET ORAL at 08:27

## 2022-02-13 RX ADMIN — CLOPIDOGREL BISULFATE 75 MILLIGRAM(S): 75 TABLET, FILM COATED ORAL at 11:50

## 2022-02-13 RX ADMIN — MAGNESIUM OXIDE 400 MG ORAL TABLET 400 MILLIGRAM(S): 241.3 TABLET ORAL at 11:47

## 2022-02-13 RX ADMIN — Medication 81 MILLIGRAM(S): at 11:50

## 2022-02-13 RX ADMIN — SACUBITRIL AND VALSARTAN 1 TABLET(S): 24; 26 TABLET, FILM COATED ORAL at 17:24

## 2022-02-13 RX ADMIN — SACUBITRIL AND VALSARTAN 1 TABLET(S): 24; 26 TABLET, FILM COATED ORAL at 05:44

## 2022-02-13 RX ADMIN — Medication 50 MILLIGRAM(S): at 05:45

## 2022-02-13 RX ADMIN — SPIRONOLACTONE 25 MILLIGRAM(S): 25 TABLET, FILM COATED ORAL at 05:44

## 2022-02-13 RX ADMIN — MAGNESIUM OXIDE 400 MG ORAL TABLET 400 MILLIGRAM(S): 241.3 TABLET ORAL at 17:24

## 2022-02-13 RX ADMIN — ENOXAPARIN SODIUM 40 MILLIGRAM(S): 100 INJECTION SUBCUTANEOUS at 11:51

## 2022-02-13 RX ADMIN — PANTOPRAZOLE SODIUM 40 MILLIGRAM(S): 20 TABLET, DELAYED RELEASE ORAL at 05:45

## 2022-02-13 RX ADMIN — Medication 145 MILLIGRAM(S): at 11:50

## 2022-02-13 RX ADMIN — BUDESONIDE AND FORMOTEROL FUMARATE DIHYDRATE 2 PUFF(S): 160; 4.5 AEROSOL RESPIRATORY (INHALATION) at 21:39

## 2022-02-13 RX ADMIN — Medication 1: at 11:52

## 2022-02-13 RX ADMIN — BUDESONIDE AND FORMOTEROL FUMARATE DIHYDRATE 2 PUFF(S): 160; 4.5 AEROSOL RESPIRATORY (INHALATION) at 08:28

## 2022-02-13 NOTE — PROGRESS NOTE ADULT - SUBJECTIVE AND OBJECTIVE BOX
pt seen and examined.         ROS: no cp, no sob, no n/v, no fever    PAST MEDICAL & SURGICAL HISTORY:  Hypertension    Hyperlipidemia    Anxiety and depression    COPD, severe    CHF (congestive heart failure)    Cerebrovascular accident (CVA)  Multiple    Type 2 diabetes mellitus    CKD (chronic kidney disease), stage II    No significant past surgical history        Vital Signs Last 24 Hrs  T(C): 36.2 (13 Feb 2022 05:59), Max: 36.2 (12 Feb 2022 13:54)  T(F): 97.1 (13 Feb 2022 05:59), Max: 97.2 (12 Feb 2022 13:54)  HR: 71 (13 Feb 2022 05:59) (71 - 96)  BP: 106/62 (13 Feb 2022 05:59) (106/62 - 117/72)  BP(mean): --  RR: 18 (13 Feb 2022 05:59) (17 - 18)  SpO2: --    physical exam  constitutional NAD, AAOX3, Respiratory  lungs CTA, CVS heart RRR, GI: abdomen Soft NT, ND, BS+, skin: intact  neuro exam Motor, sensory and CN normal, no deficit     MEDICATIONS  (STANDING):  aspirin enteric coated 81 milliGRAM(s) Oral daily  atorvastatin 80 milliGRAM(s) Oral at bedtime  budesonide 160 MICROgram(s)/formoterol 4.5 MICROgram(s) Inhaler 2 Puff(s) Inhalation two times a day  buMETAnide Infusion 1 mG/Hr (5 mL/Hr) IV Continuous <Continuous>  clopidogrel Tablet 75 milliGRAM(s) Oral daily  enoxaparin Injectable 40 milliGRAM(s) SubCutaneous daily  fenofibrate Tablet 145 milliGRAM(s) Oral daily  influenza   Vaccine 0.5 milliLiter(s) IntraMuscular once  insulin lispro (ADMELOG) corrective regimen sliding scale   SubCutaneous Before meals and at bedtime  magnesium oxide 400 milliGRAM(s) Oral three times a day with meals  metoprolol succinate ER 50 milliGRAM(s) Oral daily  pantoprazole    Tablet 40 milliGRAM(s) Oral before breakfast  QUEtiapine 25 milliGRAM(s) Oral at bedtime  sacubitril 49 mG/valsartan 51 mG 1 Tablet(s) Oral two times a day  spironolactone 25 milliGRAM(s) Oral daily    MEDICATIONS  (PRN):  ALBUTerol    90 MICROgram(s) HFA Inhaler 2 Puff(s) Inhalation every 6 hours PRN Bronchospasm  albuterol/ipratropium for Nebulization 3 milliLiter(s) Nebulizer every 6 hours PRN Shortness of Breath and/or Wheezing  loperamide 2 milliGRAM(s) Oral three times a day PRN Diarrhea      CAPILLARY BLOOD GLUCOSE      POCT Blood Glucose.: 199 mg/dL (13 Feb 2022 11:41)  POCT Blood Glucose.: 114 mg/dL (13 Feb 2022 08:18)  POCT Blood Glucose.: 143 mg/dL (12 Feb 2022 22:29)  POCT Blood Glucose.: 120 mg/dL (12 Feb 2022 17:31)                          13.8   5.20  )-----------( 219      ( 13 Feb 2022 04:30 )             44.0     02-13    140  |  93<L>  |  18  ----------------------------<  134<H>  4.1   |  32  |  1.2    Ca    9.0      13 Feb 2022 04:30  Mg     1.6     02-13      Ferritin, Serum: 248 ng/mL [15 - 150] (01-12-22 @ 16:00)  D-Dimer Assay, Quantitative: 229 ng/mL DDU [0 - 230] (01-12-22 @ 16:00)  Procalcitonin, Serum: 0.06 ng/mL [0.02 - 0.10] (01-12-22 @ 16:00)  Procalcitonin, Serum: 0.08 ng/mL [0.02 - 0.10] (01-11-22 @ 12:44)  Ferritin, Serum: 283 ng/mL [15 - 150] (01-11-22 @ 12:44)  D-Dimer Assay, Quantitative: 453 ng/mL DDU [0 - 230] (01-11-22 @ 12:44)    COVID-19 PCR: NotDetec (02-06-22 @ 10:50)    GI PCR Panel, Stool (collected 10 Feb 2022 22:18)  Source: .Stool Feces  Final Report (11 Feb 2022 15:09):    GI PCR Results: NOT detected    *******Please Note:*******    GI panel PCR evaluates for:    Campylobacter, Plesiomonas shigelloides, Salmonella,    Vibrio, Yersinia enterocolitica, Enteroaggregative    Escherichia coli (EAEC), Enteropathogenic E.coli (EPEC),    Enterotoxigenic E. coli (ETEC) lt/st, Shiga-like    toxin-producing E. coli (STEC) stx1/stx2,    Shigella/ Enteroinvasive E. coli (EIEC), Cryptosporidium,    Cyclospora cayetanensis, Entamoeba histolytica,    Giardia lamblia, Adenovirus F 40/41, Astrovirus,    Norovirus GI/GII, Rotavirus A, Sapovirus    a/p  # SOB due to Acute CHF exacerbation, HFrEF  in presence of   COPD on 3L home O2 ( not in exac) and severe pulmonary hypertension and mild to mod pericardial effusion, neg PE,   pericardial effusion , - No intervention per CTS  followup heart failure team   - Daily weight. Strict I & Os. Keep I < O. Fluid restriction  - Continue Metoprolol 50mg PO QD, Entresto 49-51 BID and spironolactone 25mg PO QD  cont bumex per heart failure team for now     # PAD  - Duplex showed b/l SFA occlusion  - c/w ASA, plavix and lipitor  - No acute surgical intervention as per vascular  - outpt follow up with vascular in 2-4 weeks    #CAD s/p PCI to RCA - Continue ASA 81mg PO QD, Plavix 75mg PO QD, Metoprolol 50mg PO QD    #CORNELIUS on CPAP - CPAP at night     #Hx of CVA with residual weakness   #HTN/DLD  - Continue atorvastatin 80mg PO QD, ASA 81mg PO QD, Plavix 75mg PO QD, fenofibrate 145mg PO QD    #Diabetes Mellitus type II   - Last HbA1C 7.7 % (11-10-21)  - c/w insulin per protocol     #Progress Note Handoff  Pending (specify):  clinical optimization   Family discussion: natalie pt   Disposition: Home with home care when stable

## 2022-02-14 LAB
ALBUMIN SERPL ELPH-MCNC: 3.6 G/DL — SIGNIFICANT CHANGE UP (ref 3.5–5.2)
ALP SERPL-CCNC: 93 U/L — SIGNIFICANT CHANGE UP (ref 30–115)
ALT FLD-CCNC: 9 U/L — SIGNIFICANT CHANGE UP (ref 0–41)
ANION GAP SERPL CALC-SCNC: 16 MMOL/L — HIGH (ref 7–14)
AST SERPL-CCNC: 19 U/L — SIGNIFICANT CHANGE UP (ref 0–41)
BASOPHILS # BLD AUTO: 0.1 K/UL — SIGNIFICANT CHANGE UP (ref 0–0.2)
BASOPHILS NFR BLD AUTO: 1.3 % — HIGH (ref 0–1)
BILIRUB SERPL-MCNC: 1.8 MG/DL — HIGH (ref 0.2–1.2)
BUN SERPL-MCNC: 21 MG/DL — HIGH (ref 10–20)
CALCIUM SERPL-MCNC: 9.2 MG/DL — SIGNIFICANT CHANGE UP (ref 8.5–10.1)
CHLORIDE SERPL-SCNC: 93 MMOL/L — LOW (ref 98–110)
CO2 SERPL-SCNC: 29 MMOL/L — SIGNIFICANT CHANGE UP (ref 17–32)
CREAT SERPL-MCNC: 1.1 MG/DL — SIGNIFICANT CHANGE UP (ref 0.7–1.5)
EOSINOPHIL # BLD AUTO: 0.06 K/UL — SIGNIFICANT CHANGE UP (ref 0–0.7)
EOSINOPHIL NFR BLD AUTO: 0.8 % — SIGNIFICANT CHANGE UP (ref 0–8)
GLUCOSE BLDC GLUCOMTR-MCNC: 123 MG/DL — HIGH (ref 70–99)
GLUCOSE BLDC GLUCOMTR-MCNC: 127 MG/DL — HIGH (ref 70–99)
GLUCOSE BLDC GLUCOMTR-MCNC: 152 MG/DL — HIGH (ref 70–99)
GLUCOSE BLDC GLUCOMTR-MCNC: 167 MG/DL — HIGH (ref 70–99)
GLUCOSE SERPL-MCNC: 131 MG/DL — HIGH (ref 70–99)
HCT VFR BLD CALC: 43.7 % — SIGNIFICANT CHANGE UP (ref 37–47)
HGB BLD-MCNC: 13.7 G/DL — SIGNIFICANT CHANGE UP (ref 12–16)
IMM GRANULOCYTES NFR BLD AUTO: 0.3 % — SIGNIFICANT CHANGE UP (ref 0.1–0.3)
LYMPHOCYTES # BLD AUTO: 1.03 K/UL — LOW (ref 1.2–3.4)
LYMPHOCYTES # BLD AUTO: 13.2 % — LOW (ref 20.5–51.1)
MAGNESIUM SERPL-MCNC: 1.7 MG/DL — LOW (ref 1.8–2.4)
MCHC RBC-ENTMCNC: 28.4 PG — SIGNIFICANT CHANGE UP (ref 27–31)
MCHC RBC-ENTMCNC: 31.4 G/DL — LOW (ref 32–37)
MCV RBC AUTO: 90.5 FL — SIGNIFICANT CHANGE UP (ref 81–99)
MONOCYTES # BLD AUTO: 0.45 K/UL — SIGNIFICANT CHANGE UP (ref 0.1–0.6)
MONOCYTES NFR BLD AUTO: 5.8 % — SIGNIFICANT CHANGE UP (ref 1.7–9.3)
NEUTROPHILS # BLD AUTO: 6.15 K/UL — SIGNIFICANT CHANGE UP (ref 1.4–6.5)
NEUTROPHILS NFR BLD AUTO: 78.6 % — HIGH (ref 42.2–75.2)
NRBC # BLD: 0 /100 WBCS — SIGNIFICANT CHANGE UP (ref 0–0)
PLATELET # BLD AUTO: 204 K/UL — SIGNIFICANT CHANGE UP (ref 130–400)
POTASSIUM SERPL-MCNC: 3.5 MMOL/L — SIGNIFICANT CHANGE UP (ref 3.5–5)
POTASSIUM SERPL-SCNC: 3.5 MMOL/L — SIGNIFICANT CHANGE UP (ref 3.5–5)
PROT SERPL-MCNC: 6.8 G/DL — SIGNIFICANT CHANGE UP (ref 6–8)
RBC # BLD: 4.83 M/UL — SIGNIFICANT CHANGE UP (ref 4.2–5.4)
RBC # FLD: 21.2 % — HIGH (ref 11.5–14.5)
SODIUM SERPL-SCNC: 138 MMOL/L — SIGNIFICANT CHANGE UP (ref 135–146)
WBC # BLD: 7.81 K/UL — SIGNIFICANT CHANGE UP (ref 4.8–10.8)
WBC # FLD AUTO: 7.81 K/UL — SIGNIFICANT CHANGE UP (ref 4.8–10.8)

## 2022-02-14 PROCEDURE — 99233 SBSQ HOSP IP/OBS HIGH 50: CPT

## 2022-02-14 PROCEDURE — 99232 SBSQ HOSP IP/OBS MODERATE 35: CPT

## 2022-02-14 RX ORDER — SACUBITRIL AND VALSARTAN 24; 26 MG/1; MG/1
1 TABLET, FILM COATED ORAL
Refills: 0 | Status: DISCONTINUED | OUTPATIENT
Start: 2022-02-14 | End: 2022-02-16

## 2022-02-14 RX ADMIN — Medication 20 MILLIGRAM(S): at 18:02

## 2022-02-14 RX ADMIN — Medication 81 MILLIGRAM(S): at 12:30

## 2022-02-14 RX ADMIN — QUETIAPINE FUMARATE 25 MILLIGRAM(S): 200 TABLET, FILM COATED ORAL at 21:24

## 2022-02-14 RX ADMIN — MAGNESIUM OXIDE 400 MG ORAL TABLET 400 MILLIGRAM(S): 241.3 TABLET ORAL at 12:26

## 2022-02-14 RX ADMIN — QUETIAPINE FUMARATE 25 MILLIGRAM(S): 200 TABLET, FILM COATED ORAL at 01:23

## 2022-02-14 RX ADMIN — Medication 145 MILLIGRAM(S): at 12:26

## 2022-02-14 RX ADMIN — SACUBITRIL AND VALSARTAN 1 TABLET(S): 24; 26 TABLET, FILM COATED ORAL at 18:02

## 2022-02-14 RX ADMIN — PANTOPRAZOLE SODIUM 40 MILLIGRAM(S): 20 TABLET, DELAYED RELEASE ORAL at 05:22

## 2022-02-14 RX ADMIN — CLOPIDOGREL BISULFATE 75 MILLIGRAM(S): 75 TABLET, FILM COATED ORAL at 12:26

## 2022-02-14 RX ADMIN — SPIRONOLACTONE 25 MILLIGRAM(S): 25 TABLET, FILM COATED ORAL at 05:22

## 2022-02-14 RX ADMIN — MAGNESIUM OXIDE 400 MG ORAL TABLET 400 MILLIGRAM(S): 241.3 TABLET ORAL at 09:57

## 2022-02-14 RX ADMIN — ATORVASTATIN CALCIUM 80 MILLIGRAM(S): 80 TABLET, FILM COATED ORAL at 21:24

## 2022-02-14 RX ADMIN — BUDESONIDE AND FORMOTEROL FUMARATE DIHYDRATE 2 PUFF(S): 160; 4.5 AEROSOL RESPIRATORY (INHALATION) at 21:01

## 2022-02-14 RX ADMIN — MAGNESIUM OXIDE 400 MG ORAL TABLET 400 MILLIGRAM(S): 241.3 TABLET ORAL at 18:02

## 2022-02-14 RX ADMIN — Medication 50 MILLIGRAM(S): at 05:22

## 2022-02-14 RX ADMIN — Medication 1: at 21:44

## 2022-02-14 RX ADMIN — SACUBITRIL AND VALSARTAN 1 TABLET(S): 24; 26 TABLET, FILM COATED ORAL at 05:22

## 2022-02-14 RX ADMIN — ATORVASTATIN CALCIUM 80 MILLIGRAM(S): 80 TABLET, FILM COATED ORAL at 01:23

## 2022-02-14 RX ADMIN — ENOXAPARIN SODIUM 40 MILLIGRAM(S): 100 INJECTION SUBCUTANEOUS at 12:26

## 2022-02-14 NOTE — PROGRESS NOTE ADULT - SUBJECTIVE AND OBJECTIVE BOX
SUBJECTIVE:    Patient is a 56y old Female who presents with a chief complaint of Shortness of breath (14 Feb 2022 13:48)    Currently admitted to medicine with the primary diagnosis of: CHF     Today is hospital day 8d.     Overnight Events:     No aciute overnight events.     PAST MEDICAL & SURGICAL HISTORY  Hypertension    Hyperlipidemia    Anxiety and depression    COPD, severe    CHF (congestive heart failure)    Cerebrovascular accident (CVA)  Multiple    Type 2 diabetes mellitus    CKD (chronic kidney disease), stage II    No significant past surgical history    ALLERGIES:  No Known Allergies    MEDICATIONS:  STANDING MEDICATIONS  aspirin enteric coated 81 milliGRAM(s) Oral daily  atorvastatin 80 milliGRAM(s) Oral at bedtime  budesonide 160 MICROgram(s)/formoterol 4.5 MICROgram(s) Inhaler 2 Puff(s) Inhalation two times a day  clopidogrel Tablet 75 milliGRAM(s) Oral daily  enoxaparin Injectable 40 milliGRAM(s) SubCutaneous daily  fenofibrate Tablet 145 milliGRAM(s) Oral daily  influenza   Vaccine 0.5 milliLiter(s) IntraMuscular once  insulin lispro (ADMELOG) corrective regimen sliding scale   SubCutaneous Before meals and at bedtime  magnesium oxide 400 milliGRAM(s) Oral three times a day with meals  metoprolol succinate ER 50 milliGRAM(s) Oral daily  pantoprazole    Tablet 40 milliGRAM(s) Oral before breakfast  QUEtiapine 25 milliGRAM(s) Oral at bedtime  sacubitril 97 mG/valsartan 103 mG 1 Tablet(s) Oral two times a day  spironolactone 25 milliGRAM(s) Oral daily  torsemide 20 milliGRAM(s) Oral two times a day    PRN MEDICATIONS  ALBUTerol    90 MICROgram(s) HFA Inhaler 2 Puff(s) Inhalation every 6 hours PRN  albuterol/ipratropium for Nebulization 3 milliLiter(s) Nebulizer every 6 hours PRN  loperamide 2 milliGRAM(s) Oral three times a day PRN    VITALS:   ICU Vital Signs Last 24 Hrs  T(C): 36.2 (14 Feb 2022 11:01), Max: 36.2 (13 Feb 2022 17:00)  T(F): 97.2 (14 Feb 2022 11:01), Max: 97.2 (13 Feb 2022 17:00)  HR: 77 (14 Feb 2022 11:01) (72 - 92)  BP: 108/64 (14 Feb 2022 11:01) (108/64 - 140/90)  RR: 18 (14 Feb 2022 11:01) (18 - 19)  SpO2: 97% (14 Feb 2022 11:01) (97% - 97%)      LABS:                        13.7   7.81  )-----------( 204      ( 14 Feb 2022 04:30 )             43.7     02-14    138  |  93<L>  |  21<H>  ----------------------------<  131<H>  3.5   |  29  |  1.1    Ca    9.2      14 Feb 2022 04:30  Mg     1.7     02-14    TPro  6.8  /  Alb  3.6  /  TBili  1.8<H>  /  DBili  x   /  AST  19  /  ALT  9   /  AlkPhos  93  02-14      RADIOLOGY:    < from: [facility] Duplex Lower Extrem Arterial, Bilat (02.10.22 @ 23:27) >  Impression:      Right: Right superficial femoral artery appears to be occluded with no   flow distally      Left: Left superficial femoral artery appears to be occluded with minimal   flow distally    vascular surgery consultation recommended if clinically warranted    ICD-10:I 73.89    < end of copied text >  < from: CT Angio Chest PE Protocol w/ IV Cont (02.06.22 @ 14:46) >  IMPRESSION:    Since 1/11/2022:    1.  No pulmonary embolus.    2.  Cardiomegaly with asymmetric right heart dilatation. Mild/moderate   pericardial effusion, slightly increased since prior. Consider   correlation with echocardiography as clinically warranted.    3.  Small bilateral pleural effusions, slightly increased since prior.   Trace perihepatic ascites, partially imaged.    --- End of Report ---    < end of copied text >  < from: Xray Chest 1 View-PORTABLE IMMEDIATE (02.06.22 @ 12:03) >  Impression:    1. Mild pulmonary vascular congestion and interstitial edema.  2. Stable cardiomegaly.    --- End of Report ---    < end of copied text >  < from: TTE Echo Complete w/o Contrast w/ Doppler (02.06.22 @ 17:21) >  Summary:   1. LV Ejection Fraction by Urena's Method with a biplane EF of 19 %.   2. Severely decreased global left ventricular systolic function.   3. Dilated RV with moderately reduced systolic function.   4. Moderately enlarged left atrium.   5. Severely enlarged right atrium.   6. Sclerotic aorticvalve with normal opening.   7. Mild aortic regurgitation.   8. The mitral valve leaflets are tethered due to reduced systolic   function and elevated LVDP.   9. Moderate-to-severe mitral regurgitation.  10. Severe tricuspid regurgitation.  11. Estimated pulmonary artery systolic pressure is 53.7 mmHg assuming a   right atrial pressure of 15 mmHg, which is consistent with moderate   pulmonary hypertension.  12. Small pericardial effusion adjacent to the right atrium.    < end of copied text >      PHYSICAL EXAM:    GENERAL: NAD, lying in bed comfortably  CHEST/LUNG: decrease air entry b/l   HEART: Regular rate and rhythm; No murmurs, rubs, or gallops  ABDOMEN: Bowel sounds present; Soft, Nontender, Nondistended. No hepatomegally  EXTREMITIES: +1 edema   NERVOUS SYSTEM:  Alert & Oriented X3, speech clear. No deficits   MSK: FROM all 4 extremities, full and equal strength  SKIN: No rashes or lesions         SUBJECTIVE:    Patient is a 56y old Female who presents with a chief complaint of Shortness of breath (14 Feb 2022 13:48)    Currently admitted to medicine with the primary diagnosis of: CHF     Today is hospital day 8d.     ROS no chest pain , no sob     Overnight Events:     No acute overnight events.     PAST MEDICAL & SURGICAL HISTORY  Hypertension    Hyperlipidemia    Anxiety and depression    COPD, severe    CHF (congestive heart failure)    Cerebrovascular accident (CVA)  Multiple    Type 2 diabetes mellitus    CKD (chronic kidney disease), stage II    No significant past surgical history    ALLERGIES:  No Known Allergies    MEDICATIONS:  STANDING MEDICATIONS  aspirin enteric coated 81 milliGRAM(s) Oral daily  atorvastatin 80 milliGRAM(s) Oral at bedtime  budesonide 160 MICROgram(s)/formoterol 4.5 MICROgram(s) Inhaler 2 Puff(s) Inhalation two times a day  clopidogrel Tablet 75 milliGRAM(s) Oral daily  enoxaparin Injectable 40 milliGRAM(s) SubCutaneous daily  fenofibrate Tablet 145 milliGRAM(s) Oral daily  influenza   Vaccine 0.5 milliLiter(s) IntraMuscular once  insulin lispro (ADMELOG) corrective regimen sliding scale   SubCutaneous Before meals and at bedtime  magnesium oxide 400 milliGRAM(s) Oral three times a day with meals  metoprolol succinate ER 50 milliGRAM(s) Oral daily  pantoprazole    Tablet 40 milliGRAM(s) Oral before breakfast  QUEtiapine 25 milliGRAM(s) Oral at bedtime  sacubitril 97 mG/valsartan 103 mG 1 Tablet(s) Oral two times a day  spironolactone 25 milliGRAM(s) Oral daily  torsemide 20 milliGRAM(s) Oral two times a day    PRN MEDICATIONS  ALBUTerol    90 MICROgram(s) HFA Inhaler 2 Puff(s) Inhalation every 6 hours PRN  albuterol/ipratropium for Nebulization 3 milliLiter(s) Nebulizer every 6 hours PRN  loperamide 2 milliGRAM(s) Oral three times a day PRN    VITALS:   ICU Vital Signs Last 24 Hrs  T(C): 36.2 (14 Feb 2022 11:01), Max: 36.2 (13 Feb 2022 17:00)  T(F): 97.2 (14 Feb 2022 11:01), Max: 97.2 (13 Feb 2022 17:00)  HR: 77 (14 Feb 2022 11:01) (72 - 92)  BP: 108/64 (14 Feb 2022 11:01) (108/64 - 140/90)  RR: 18 (14 Feb 2022 11:01) (18 - 19)  SpO2: 97% (14 Feb 2022 11:01) (97% - 97%)      LABS:                        13.7   7.81  )-----------( 204      ( 14 Feb 2022 04:30 )             43.7     02-14    138  |  93<L>  |  21<H>  ----------------------------<  131<H>  3.5   |  29  |  1.1    Ca    9.2      14 Feb 2022 04:30  Mg     1.7     02-14    TPro  6.8  /  Alb  3.6  /  TBili  1.8<H>  /  DBili  x   /  AST  19  /  ALT  9   /  AlkPhos  93  02-14      RADIOLOGY:    < from: VA Duplex Lower Extrem Arterial, Bilat (02.10.22 @ 23:27) >  Impression:      Right: Right superficial femoral artery appears to be occluded with no   flow distally      Left: Left superficial femoral artery appears to be occluded with minimal   flow distally    vascular surgery consultation recommended if clinically warranted    ICD-10:I 73.89    < end of copied text >  < from: CT Angio Chest PE Protocol w/ IV Cont (02.06.22 @ 14:46) >  IMPRESSION:    Since 1/11/2022:    1.  No pulmonary embolus.    2.  Cardiomegaly with asymmetric right heart dilatation. Mild/moderate   pericardial effusion, slightly increased since prior. Consider   correlation with echocardiography as clinically warranted.    3.  Small bilateral pleural effusions, slightly increased since prior.   Trace perihepatic ascites, partially imaged.    --- End of Report ---    < end of copied text >  < from: Xray Chest 1 View-PORTABLE IMMEDIATE (02.06.22 @ 12:03) >  Impression:    1. Mild pulmonary vascular congestion and interstitial edema.  2. Stable cardiomegaly.    --- End of Report ---    < end of copied text >  < from: TTE Echo Complete w/o Contrast w/ Doppler (02.06.22 @ 17:21) >  Summary:   1. LV Ejection Fraction by Urena's Method with a biplane EF of 19 %.   2. Severely decreased global left ventricular systolic function.   3. Dilated RV with moderately reduced systolic function.   4. Moderately enlarged left atrium.   5. Severely enlarged right atrium.   6. Sclerotic aorticvalve with normal opening.   7. Mild aortic regurgitation.   8. The mitral valve leaflets are tethered due to reduced systolic   function and elevated LVDP.   9. Moderate-to-severe mitral regurgitation.  10. Severe tricuspid regurgitation.  11. Estimated pulmonary artery systolic pressure is 53.7 mmHg assuming a   right atrial pressure of 15 mmHg, which is consistent with moderate   pulmonary hypertension.  12. Small pericardial effusion adjacent to the right atrium.    < end of copied text >      PHYSICAL EXAM:    GENERAL: NAD, lying in bed comfortably  CHEST/LUNG: decrease air entry b/l   HEART: Regular rate and rhythm; No murmurs, rubs, or gallops  ABDOMEN: Bowel sounds present; Soft, Nontender, Nondistended. No hepatomegally  EXTREMITIES: +1 edema   NERVOUS SYSTEM:  Alert & Oriented X3, speech clear. No deficits   MSK: FROM all 4 extremities, full and equal strength  SKIN: No rashes or lesions

## 2022-02-14 NOTE — PROGRESS NOTE ADULT - ATTENDING COMMENTS
Volume status significantly improved and asking to go home. Will switch diuretics to torsemide 20 mg BID which was home regimen but she was not taking. Will increase Entresto to  mg BID. Consult EP per Dr. Joo Suggs for consideration of device. Possible discharge tomorrow if acceptable response to PO diuretics. Importance of medication and outpatient visit compliance discussed at length.

## 2022-02-14 NOTE — PROGRESS NOTE ADULT - SUBJECTIVE AND OBJECTIVE BOX
Date of Admission: 22    Interval History:     - Patient resting in bed, in NAD  - No overnight events  - C/o constipation- primary team notified       HISTORY OF PRESENT ILLNESS: 55 YO F w/ PMHx of COPD (on 3L home O2), CORNELIUS on CPAP, HFrEF (15-20% on TTE 2021), severe pulmonary HTN, CAD s/p PCI to RCA, HTN/HLD, CVA with residual LLE weakness, T2DM, recently quit smoking (1 month ago) presented for shortness of breath that worsened last night. As per the patient she was noticing worsening LE swelling and increased SOB for the last few days. Last last she had chinese food and then her SOB worsened. Per EMS, patient was placed on NRB 10L and one albuterol treatment with improvement en route. Pt was recently admitted in 2021 for b/l lower extremity edema and was found to be COVID-19 positive at the time. Reports being vaccinated against COVID-19.   Denies fever/chills. No chest pain, palpitations, abd pain, N/V/D. Has cough with sputum at baseline, denies any change in cough or sputum. Pt wheelchair bound at baseline.  In the ED VS were sig for a HR of 121 bpm. She was placed on BiPAP and then transitioned to NC  CT angio showed mild-mod pericardial effusion. ED spoke with CT surgery and they will evaluate  (2022 16:33)    Patient well known to heart failure team from multiple previous admissions. Patient reports progressively worsening SOB and LE edema x3 days, exacerbated by non compliance with low Na diet (Chinese take out food) the night before admission. Patient also noticed abdominal bloating, decreased appetite, and pain in legs while walking right before admission as well. Denies chest pain, palpitations, n/v, cough, or abnormal bleeding.       PAST MEDICAL & SURGICAL HISTORY:  Hypertension  Hyperlipidemia  Anxiety and depression  COPD, severe  CHF (congestive heart failure)  Cerebrovascular accident (CVA)Multiple  Type 2 diabetes mellitus  CKD (chronic kidney disease), stage II  No significant past surgical history        FAMILY HISTORY:  No pertinent family history of premature cardiovascular disease in first degree relatives.  Mother:  of cancer at 65  Father:  of an overdose at 50    SOCIAL HISTORY:    Current every day smoker, 2 cigarettes a day, denies alcohol or drug use    Allergies  No Known Allergies    Intolerances      PHYSICAL EXAM:  General Appearance: well appearing, normal for age and gender. 	  Neck: unable to assess due to body habitus  Eyes: Extra Ocular muscles intact.   Cardiovascular: regular rate and rhythm S1 S2, , No murmurs,+1 B/L pedal edema  Respiratory: B/L anterior expiratory wheezing, posterior lung fields clear  Psychiatry: Alert and oriented x 3, Mood & affect appropriate  Gastrointestinal:  Round, Non-tender  Skin/Integumentary : No rashes, No ecchymoses, No cyanosis	  Neurologic: Non-focal  Musculoskeletal/ extremities: Normal range of motion, No clubbing, cyanosis   Vascular: Peripheral pulses palpable 2+ bilaterally    CARDIAC MARKERS:  Serum Pro-Brain Natriuretic Peptide: 4892 pg/mL (22 @ 10:50)      TELEMETRY EVENTS: 	    EC/6/22      Ventricular Rate 116 BPM  Atrial Rate 116 BPM  P-R Interval 148 ms  QRS Duration 148 ms  Q-T Interval 346 ms  QTC Calculation(Bazett) 480 ms  R Axis 162 degrees  T Axis -11 degrees    Diagnosis Line Sinus tachycardia  Right axis deviation  Non-specific intra-ventricular conduction delay  Abnormal ECG    Confirmed by Elenita Combs MD ([id number]) on 2022 4:06:55 PM        PREVIOUS DIAGNOSTIC TESTING:      TTE 22    Summary:   1. LV Ejection Fraction by Urena's Method with a biplane EF of 19 %.   2. Severely decreased global left ventricular systolic function.   3. Dilated RV with moderately reduced systolic function.   4. Moderately enlarged left atrium.   5. Severely enlarged right atrium.   6. Sclerotic aorticvalve with normal opening.   7. Mild aortic regurgitation.   8. The mitral valve leaflets are tethered due to reduced systolic   function and elevated LVDP.   9. Moderate-to-severe mitral regurgitation.  10. Severe tricuspid regurgitation.  11. Estimated pulmonary artery systolic pressure is 53.7 mmHg assuming a   right atrial pressure of 15 mmHg, which is consistent with moderate   pulmonary hypertension.  12. Small pericardial effusion adjacent to the right atrium.        TTE 21    Summary:   1. Left ventricular ejection fraction, by visual estimation, is 35 to 40%.   2.Moderately decreased global left ventricular systolic function.   3. Multiple left ventricular regional wall motion abnormalities exist. See wall motion findings.   4. Elevated left atrial and left ventricular end-diastolic pressures.   5. Mild concentric left ventricular hypertrophy.   6. Mildly increased LV wall thickness.   7. Normal left ventricular internal cavity size.   8. Spectral Doppler shows restrictive pattern of left ventricular myocardial filling (Grade III diastolic dysfunction).  9. Moderately reduced RV systolic function.  10. Mildly enlarged left atrium.  11. Moderately enlarged right atrium.  12. Small pericardial effusion.  13. Mild to moderate mitral valve regurgitation.  14. Moderate-severe tricuspid regurgitation.  15.Mild aortic regurgitation.  16. Sclerotic aortic valve with normal opening.  17. Estimated pulmonary artery systolic pressure is 50.0 mmHg assuming a right atrial pressure of 15 mmHg, which is consistent with moderate pulmonary hypertension.  18. Pulmonary hypertension is present.  19. LA volume Index is 36.7 ml/m² ml/m2.    	    Home Medications:  Albuterol (Eqv-ProAir HFA) 90 mcg/inh inhalation aerosol: 2 puff(s) inhaled every 6 hours, As Needed (2021 11:22)  aspirin 81 mg oral tablet: 1 tab(s) orally once a day (2021 11:22)  fenofibrate 145 mg oral tablet: 1 tab(s) orally once a day (2021 11:22)  glipiZIDE 10 mg oral tablet: 1 tab(s) orally 2 times a day (2021 11:22)  Lovaza 1000 mg oral capsule: 2 cap(s) orally 2 times a day (2021 14:52)  metFORMIN 1000 mg oral tablet: 1 tab(s) orally 2 times a day Do not take until  (2021 12:58)  Plavix 75 mg oral tablet: 1 tab(s) orally once a day (2021 11:22)  Vitamin D2 1.25 mg (50,000 intl units) oral capsule: 1 cap(s) orally once a week (2021 14:52)    MEDICATIONS  (STANDING):  aspirin  chewable 81 milliGRAM(s) Oral daily  atorvastatin 80 milliGRAM(s) Oral at bedtime  budesonide 160 MICROgram(s)/formoterol 4.5 MICROgram(s) Inhaler 2 Puff(s) Inhalation two times a day  chlorhexidine 4% Liquid 1 Application(s) Topical <User Schedule>  clopidogrel Tablet 75 milliGRAM(s) Oral daily  enoxaparin Injectable 40 milliGRAM(s) SubCutaneous daily  fenofibrate Tablet 145 milliGRAM(s) Oral daily  furosemide   Injectable 40 milliGRAM(s) IV Push two times a day  magnesium sulfate  IVPB 2 Gram(s) IV Intermittent once  metoprolol succinate ER 50 milliGRAM(s) Oral daily  omega-3-Acid Ethyl Esters 2 Gram(s) Oral two times a day  pantoprazole    Tablet 40 milliGRAM(s) Oral before breakfast  sacubitril 49 mG/valsartan 51 mG 1 Tablet(s) Oral two times a day    MEDICATIONS  (PRN):  ALBUTerol    90 MICROgram(s) HFA Inhaler 2 Puff(s) Inhalation every 6 hours PRN Shortness of Breath and/or Wheezing

## 2022-02-14 NOTE — PROGRESS NOTE ADULT - ASSESSMENT
57 YO F w/ PMHx of COPD (on 3L home O2), CORNELIUS on CPAP, HFrEF (15-20% on TTE 11/2021), severe pulmonary HTN, CAD s/p PCI to RCA, HTN/HLD, CVA with residual LLE weakness, T2DM, recently quit smoking (1 month ago) presented for shortness of breath that worsened last night.     #Acute CHF exacerbation  #Hx of HFrEF; BNP around baseline   #Hx of COPD on 3L home O2  #Hx of severe pul-HTN  #Mild-mod pericardial effusion; low probability of tamponade    - CT Angio Chest PE Protocol w/ IV Cont: No PE. Cardiomegaly with asymmetric right heart dilatation. Mild/moderate pericardial effusion, slightly increased since prior. Small bilateral pleural effusions, slightly increased since prior.   - TTE 11/14/2021: EF 15-20%, G2DD, mod MR, mild-mod TR, severe P-HTN, small pericardial effusion.  - Echo 02/06: 19% EF, small pericardial effusion, moderate p-HTN, mod-severe MR  - Troponin 0.02; around baseline.  - Daily weight. Strict I & Os. Keep I < O. Fluid restriction  - Continue Metoprolol 50mg PO QD, and spironolactone 25mg PO QD  - increase Entresto  - switch bumex to torsemide 20 BID. monitor on po diuretic   - ep consult for AICD   - f/u with HF team    #covid positive test   - patient had recent positive covid back in january 10. Covid negative test on 2/06 and repeat positive on 2/13  - Called infectious control to see if patient needed to be placed on isolation and does not need it as per infectious control . asymptomatic and recent covid infection   - ADN requesting official ID consult   - ID consult     # PAD  - Duplex showed b/l SFA occlusion  - c/w ASA, plavix and lipitor  - No acute surgical intervention as per vascular  - outpt follow up with vascular in 2-4 weeks    #CAD s/p PCI to RCA - Continue ASA 81mg PO QD, Plavix 75mg PO QD, Metoprolol 50mg PO QD    #CORNELIUS on CPAP - CPAP at night     #Hx of CVA with residual weakness   #HTN/DLD  - Continue atorvastatin 80mg PO QD, ASA 81mg PO QD, Plavix 75mg PO QD, fenofibrate 145mg PO QD    #Diabetes Mellitus type II   - Last HbA1C 7.7 % (11-10-21)  - c/w insulin    #Misc  - DVT Prophylaxis: Lovenox  - Diet: DASH/TLC, CC, fluid restriction  - GI Prophylaxis: pantoprazole   - Activity: AAT  - Code Status: Full Code  Dispo acute

## 2022-02-14 NOTE — PROGRESS NOTE ADULT - ASSESSMENT
Acute on chronic systolic HF / Fluid overload / pulmonary HTN / COPD / DM2 / Anxiety     Patient remains fluid overloaded on exam  Stop Bumex gtt  Start Torsemide 20mg PO BID  Increase Entresto to 97mg-103mg BID   Continue metoprolol XL 50 mg daily   Continue Spironolactone 25 mg daily   Get BMP daily  Maintain potassium >4.0, Mg >2.1  Strict intake and output  PT /  Incentive inspirometer  Daily weight   Plan discussed with primary team  Will continue to follow

## 2022-02-15 LAB
ALBUMIN SERPL ELPH-MCNC: 3.4 G/DL — LOW (ref 3.5–5.2)
ALP SERPL-CCNC: 85 U/L — SIGNIFICANT CHANGE UP (ref 30–115)
ALT FLD-CCNC: 8 U/L — SIGNIFICANT CHANGE UP (ref 0–41)
ANION GAP SERPL CALC-SCNC: 15 MMOL/L — HIGH (ref 7–14)
AST SERPL-CCNC: 15 U/L — SIGNIFICANT CHANGE UP (ref 0–41)
BASOPHILS # BLD AUTO: 0.09 K/UL — SIGNIFICANT CHANGE UP (ref 0–0.2)
BASOPHILS NFR BLD AUTO: 1.1 % — HIGH (ref 0–1)
BILIRUB SERPL-MCNC: 1.9 MG/DL — HIGH (ref 0.2–1.2)
BUN SERPL-MCNC: 18 MG/DL — SIGNIFICANT CHANGE UP (ref 10–20)
CALCIUM SERPL-MCNC: 9 MG/DL — SIGNIFICANT CHANGE UP (ref 8.5–10.1)
CHLORIDE SERPL-SCNC: 91 MMOL/L — LOW (ref 98–110)
CO2 SERPL-SCNC: 30 MMOL/L — SIGNIFICANT CHANGE UP (ref 17–32)
CREAT SERPL-MCNC: 1.1 MG/DL — SIGNIFICANT CHANGE UP (ref 0.7–1.5)
EOSINOPHIL # BLD AUTO: 0.12 K/UL — SIGNIFICANT CHANGE UP (ref 0–0.7)
EOSINOPHIL NFR BLD AUTO: 1.4 % — SIGNIFICANT CHANGE UP (ref 0–8)
GLUCOSE BLDC GLUCOMTR-MCNC: 135 MG/DL — HIGH (ref 70–99)
GLUCOSE BLDC GLUCOMTR-MCNC: 176 MG/DL — HIGH (ref 70–99)
GLUCOSE BLDC GLUCOMTR-MCNC: 183 MG/DL — HIGH (ref 70–99)
GLUCOSE BLDC GLUCOMTR-MCNC: 191 MG/DL — HIGH (ref 70–99)
GLUCOSE SERPL-MCNC: 189 MG/DL — HIGH (ref 70–99)
HCT VFR BLD CALC: 44.7 % — SIGNIFICANT CHANGE UP (ref 37–47)
HGB BLD-MCNC: 14.1 G/DL — SIGNIFICANT CHANGE UP (ref 12–16)
IMM GRANULOCYTES NFR BLD AUTO: 0.4 % — HIGH (ref 0.1–0.3)
LYMPHOCYTES # BLD AUTO: 1.39 K/UL — SIGNIFICANT CHANGE UP (ref 1.2–3.4)
LYMPHOCYTES # BLD AUTO: 16.6 % — LOW (ref 20.5–51.1)
MAGNESIUM SERPL-MCNC: 1.7 MG/DL — LOW (ref 1.8–2.4)
MCHC RBC-ENTMCNC: 28.4 PG — SIGNIFICANT CHANGE UP (ref 27–31)
MCHC RBC-ENTMCNC: 31.5 G/DL — LOW (ref 32–37)
MCV RBC AUTO: 89.9 FL — SIGNIFICANT CHANGE UP (ref 81–99)
MONOCYTES # BLD AUTO: 0.45 K/UL — SIGNIFICANT CHANGE UP (ref 0.1–0.6)
MONOCYTES NFR BLD AUTO: 5.4 % — SIGNIFICANT CHANGE UP (ref 1.7–9.3)
NEUTROPHILS # BLD AUTO: 6.28 K/UL — SIGNIFICANT CHANGE UP (ref 1.4–6.5)
NEUTROPHILS NFR BLD AUTO: 75.1 % — SIGNIFICANT CHANGE UP (ref 42.2–75.2)
NRBC # BLD: 0 /100 WBCS — SIGNIFICANT CHANGE UP (ref 0–0)
PHOSPHATE SERPL-MCNC: 2.8 MG/DL — SIGNIFICANT CHANGE UP (ref 2.1–4.9)
PLATELET # BLD AUTO: 218 K/UL — SIGNIFICANT CHANGE UP (ref 130–400)
POTASSIUM SERPL-MCNC: 3.4 MMOL/L — LOW (ref 3.5–5)
POTASSIUM SERPL-SCNC: 3.4 MMOL/L — LOW (ref 3.5–5)
PROT SERPL-MCNC: 6.4 G/DL — SIGNIFICANT CHANGE UP (ref 6–8)
RBC # BLD: 4.97 M/UL — SIGNIFICANT CHANGE UP (ref 4.2–5.4)
RBC # FLD: 21.2 % — HIGH (ref 11.5–14.5)
SODIUM SERPL-SCNC: 136 MMOL/L — SIGNIFICANT CHANGE UP (ref 135–146)
WBC # BLD: 8.36 K/UL — SIGNIFICANT CHANGE UP (ref 4.8–10.8)
WBC # FLD AUTO: 8.36 K/UL — SIGNIFICANT CHANGE UP (ref 4.8–10.8)

## 2022-02-15 PROCEDURE — 99233 SBSQ HOSP IP/OBS HIGH 50: CPT

## 2022-02-15 RX ORDER — MAGNESIUM SULFATE 500 MG/ML
2 VIAL (ML) INJECTION
Refills: 0 | Status: DISCONTINUED | OUTPATIENT
Start: 2022-02-15 | End: 2022-02-16

## 2022-02-15 RX ORDER — POTASSIUM CHLORIDE 20 MEQ
20 PACKET (EA) ORAL
Refills: 0 | Status: COMPLETED | OUTPATIENT
Start: 2022-02-15 | End: 2022-02-15

## 2022-02-15 RX ADMIN — Medication 20 MILLIGRAM(S): at 05:49

## 2022-02-15 RX ADMIN — QUETIAPINE FUMARATE 25 MILLIGRAM(S): 200 TABLET, FILM COATED ORAL at 21:55

## 2022-02-15 RX ADMIN — Medication 81 MILLIGRAM(S): at 12:34

## 2022-02-15 RX ADMIN — ATORVASTATIN CALCIUM 80 MILLIGRAM(S): 80 TABLET, FILM COATED ORAL at 21:55

## 2022-02-15 RX ADMIN — MAGNESIUM OXIDE 400 MG ORAL TABLET 400 MILLIGRAM(S): 241.3 TABLET ORAL at 12:35

## 2022-02-15 RX ADMIN — Medication 25 GRAM(S): at 10:22

## 2022-02-15 RX ADMIN — Medication 20 MILLIEQUIVALENT(S): at 10:23

## 2022-02-15 RX ADMIN — Medication 20 MILLIGRAM(S): at 17:03

## 2022-02-15 RX ADMIN — ENOXAPARIN SODIUM 40 MILLIGRAM(S): 100 INJECTION SUBCUTANEOUS at 12:38

## 2022-02-15 RX ADMIN — Medication 1: at 16:51

## 2022-02-15 RX ADMIN — BUDESONIDE AND FORMOTEROL FUMARATE DIHYDRATE 2 PUFF(S): 160; 4.5 AEROSOL RESPIRATORY (INHALATION) at 20:26

## 2022-02-15 RX ADMIN — Medication 20 MILLIEQUIVALENT(S): at 12:36

## 2022-02-15 RX ADMIN — PANTOPRAZOLE SODIUM 40 MILLIGRAM(S): 20 TABLET, DELAYED RELEASE ORAL at 05:49

## 2022-02-15 RX ADMIN — MAGNESIUM OXIDE 400 MG ORAL TABLET 400 MILLIGRAM(S): 241.3 TABLET ORAL at 17:03

## 2022-02-15 RX ADMIN — BUDESONIDE AND FORMOTEROL FUMARATE DIHYDRATE 2 PUFF(S): 160; 4.5 AEROSOL RESPIRATORY (INHALATION) at 07:40

## 2022-02-15 RX ADMIN — Medication 50 MILLIGRAM(S): at 05:49

## 2022-02-15 RX ADMIN — MAGNESIUM OXIDE 400 MG ORAL TABLET 400 MILLIGRAM(S): 241.3 TABLET ORAL at 07:53

## 2022-02-15 RX ADMIN — SACUBITRIL AND VALSARTAN 1 TABLET(S): 24; 26 TABLET, FILM COATED ORAL at 17:04

## 2022-02-15 RX ADMIN — Medication 1: at 22:25

## 2022-02-15 RX ADMIN — CLOPIDOGREL BISULFATE 75 MILLIGRAM(S): 75 TABLET, FILM COATED ORAL at 12:35

## 2022-02-15 RX ADMIN — SACUBITRIL AND VALSARTAN 1 TABLET(S): 24; 26 TABLET, FILM COATED ORAL at 05:51

## 2022-02-15 RX ADMIN — SPIRONOLACTONE 25 MILLIGRAM(S): 25 TABLET, FILM COATED ORAL at 05:50

## 2022-02-15 RX ADMIN — Medication 145 MILLIGRAM(S): at 12:35

## 2022-02-15 RX ADMIN — Medication 1: at 12:12

## 2022-02-15 NOTE — PROGRESS NOTE ADULT - SUBJECTIVE AND OBJECTIVE BOX
Date of Admission: 22    Interval History:     - Patient resting in bed, in NAD  - No overnight events  - Breathing improved, LE edema resolved       HISTORY OF PRESENT ILLNESS: 57 YO F w/ PMHx of COPD (on 3L home O2), CORNELIUS on CPAP, HFrEF (15-20% on TTE 2021), severe pulmonary HTN, CAD s/p PCI to RCA, HTN/HLD, CVA with residual LLE weakness, T2DM, recently quit smoking (1 month ago) presented for shortness of breath that worsened last night. As per the patient she was noticing worsening LE swelling and increased SOB for the last few days. Last last she had chinese food and then her SOB worsened. Per EMS, patient was placed on NRB 10L and one albuterol treatment with improvement en route. Pt was recently admitted in 2021 for b/l lower extremity edema and was found to be COVID-19 positive at the time. Reports being vaccinated against COVID-19.   Denies fever/chills. No chest pain, palpitations, abd pain, N/V/D. Has cough with sputum at baseline, denies any change in cough or sputum. Pt wheelchair bound at baseline.  In the ED VS were sig for a HR of 121 bpm. She was placed on BiPAP and then transitioned to NC  CT angio showed mild-mod pericardial effusion. ED spoke with CT surgery and they will evaluate  (2022 16:33)    Patient well known to heart failure team from multiple previous admissions. Patient reports progressively worsening SOB and LE edema x3 days, exacerbated by non compliance with low Na diet (Chinese take out food) the night before admission. Patient also noticed abdominal bloating, decreased appetite, and pain in legs while walking right before admission as well. Denies chest pain, palpitations, n/v, cough, or abnormal bleeding.       PAST MEDICAL & SURGICAL HISTORY:  Hypertension  Hyperlipidemia  Anxiety and depression  COPD, severe  CHF (congestive heart failure)  Cerebrovascular accident (CVA)Multiple  Type 2 diabetes mellitus  CKD (chronic kidney disease), stage II  No significant past surgical history        FAMILY HISTORY:  No pertinent family history of premature cardiovascular disease in first degree relatives.  Mother:  of cancer at 65  Father:  of an overdose at 50    SOCIAL HISTORY:    Current every day smoker, 2 cigarettes a day, denies alcohol or drug use    Allergies  No Known Allergies    Intolerances      PHYSICAL EXAM:  General Appearance: well appearing, normal for age and gender. 	  Neck: unable to assess due to body habitus  Eyes: Extra Ocular muscles intact.   Cardiovascular: regular rate and rhythm S1 S2, , No murmurs, no edema  Respiratory: B/L anterior expiratory wheezing, posterior lung fields clear  Psychiatry: Alert and oriented x 3, Mood & affect appropriate  Gastrointestinal:  Round, Non-tender  Skin/Integumentary : No rashes, No ecchymoses, No cyanosis	  Neurologic: Non-focal  Musculoskeletal/ extremities: Normal range of motion, No clubbing, cyanosis   Vascular: Peripheral pulses palpable 2+ bilaterally    CARDIAC MARKERS:  Serum Pro-Brain Natriuretic Peptide: 4892 pg/mL (22 @ 10:50)      TELEMETRY EVENTS: 	    EC/6/22      Ventricular Rate 116 BPM  Atrial Rate 116 BPM  P-R Interval 148 ms  QRS Duration 148 ms  Q-T Interval 346 ms  QTC Calculation(Bazett) 480 ms  R Axis 162 degrees  T Axis -11 degrees    Diagnosis Line Sinus tachycardia  Right axis deviation  Non-specific intra-ventricular conduction delay  Abnormal ECG    Confirmed by Elenita Combs MD ([id number]) on 2022 4:06:55 PM        PREVIOUS DIAGNOSTIC TESTING:      TTE 22    Summary:   1. LV Ejection Fraction by Urena's Method with a biplane EF of 19 %.   2. Severely decreased global left ventricular systolic function.   3. Dilated RV with moderately reduced systolic function.   4. Moderately enlarged left atrium.   5. Severely enlarged right atrium.   6. Sclerotic aorticvalve with normal opening.   7. Mild aortic regurgitation.   8. The mitral valve leaflets are tethered due to reduced systolic   function and elevated LVDP.   9. Moderate-to-severe mitral regurgitation.  10. Severe tricuspid regurgitation.  11. Estimated pulmonary artery systolic pressure is 53.7 mmHg assuming a   right atrial pressure of 15 mmHg, which is consistent with moderate   pulmonary hypertension.  12. Small pericardial effusion adjacent to the right atrium.        TTE 21    Summary:   1. Left ventricular ejection fraction, by visual estimation, is 35 to 40%.   2.Moderately decreased global left ventricular systolic function.   3. Multiple left ventricular regional wall motion abnormalities exist. See wall motion findings.   4. Elevated left atrial and left ventricular end-diastolic pressures.   5. Mild concentric left ventricular hypertrophy.   6. Mildly increased LV wall thickness.   7. Normal left ventricular internal cavity size.   8. Spectral Doppler shows restrictive pattern of left ventricular myocardial filling (Grade III diastolic dysfunction).  9. Moderately reduced RV systolic function.  10. Mildly enlarged left atrium.  11. Moderately enlarged right atrium.  12. Small pericardial effusion.  13. Mild to moderate mitral valve regurgitation.  14. Moderate-severe tricuspid regurgitation.  15.Mild aortic regurgitation.  16. Sclerotic aortic valve with normal opening.  17. Estimated pulmonary artery systolic pressure is 50.0 mmHg assuming a right atrial pressure of 15 mmHg, which is consistent with moderate pulmonary hypertension.  18. Pulmonary hypertension is present.  19. LA volume Index is 36.7 ml/m² ml/m2.    	    Home Medications:  Albuterol (Eqv-ProAir HFA) 90 mcg/inh inhalation aerosol: 2 puff(s) inhaled every 6 hours, As Needed (2021 11:22)  aspirin 81 mg oral tablet: 1 tab(s) orally once a day (2021 11:22)  fenofibrate 145 mg oral tablet: 1 tab(s) orally once a day (2021 11:22)  glipiZIDE 10 mg oral tablet: 1 tab(s) orally 2 times a day (2021 11:22)  Lovaza 1000 mg oral capsule: 2 cap(s) orally 2 times a day (2021 14:52)  metFORMIN 1000 mg oral tablet: 1 tab(s) orally 2 times a day Do not take until  (2021 12:58)  Plavix 75 mg oral tablet: 1 tab(s) orally once a day (2021 11:22)  Vitamin D2 1.25 mg (50,000 intl units) oral capsule: 1 cap(s) orally once a week (2021 14:52)    MEDICATIONS  (STANDING):  aspirin  chewable 81 milliGRAM(s) Oral daily  atorvastatin 80 milliGRAM(s) Oral at bedtime  budesonide 160 MICROgram(s)/formoterol 4.5 MICROgram(s) Inhaler 2 Puff(s) Inhalation two times a day  chlorhexidine 4% Liquid 1 Application(s) Topical <User Schedule>  clopidogrel Tablet 75 milliGRAM(s) Oral daily  enoxaparin Injectable 40 milliGRAM(s) SubCutaneous daily  fenofibrate Tablet 145 milliGRAM(s) Oral daily  furosemide   Injectable 40 milliGRAM(s) IV Push two times a day  magnesium sulfate  IVPB 2 Gram(s) IV Intermittent once  metoprolol succinate ER 50 milliGRAM(s) Oral daily  omega-3-Acid Ethyl Esters 2 Gram(s) Oral two times a day  pantoprazole    Tablet 40 milliGRAM(s) Oral before breakfast  sacubitril 49 mG/valsartan 51 mG 1 Tablet(s) Oral two times a day    MEDICATIONS  (PRN):  ALBUTerol    90 MICROgram(s) HFA Inhaler 2 Puff(s) Inhalation every 6 hours PRN Shortness of Breath and/or Wheezing

## 2022-02-15 NOTE — PROGRESS NOTE ADULT - SUBJECTIVE AND OBJECTIVE BOX
Location: [facility] (3COVF) 009 A ([facility] F9 3COVF))  Patient Name: WILLIAN MARY  Age: 56y  Gender: Female      Progress Note  This morning patient was seen and examined at bedside.    Today is hospital day 9d.  Mr. Mary is doing fine.   He reports he had a good night sleep.   He is comfortable on 3LPM Nasal Canula (home requirement) and denies shortness of breath or cough.   His appetite is adequate, and he denies nausea or vomiting.   He denies any abdominal pain, diarrhea, or constipation. His last bowel movement was soft and was on 02/14.   He is voiding freely and denies urinary symptoms (dysuria, urgency, frequency, intermittence).      Vital Signs in the last 24 hours   Vitals Summary T(C): 36.1 (02-15-22 @ 05:21), Max: 36.1 (02-15-22 @ 05:21)  HR: 77 (02-15-22 @ 05:21) (73 - 77)  BP: 112/73 (02-15-22 @ 05:21) (112/73 - 117/72)  RR: 18 (02-15-22 @ 07:07) (18 - 18)  SpO2: 98% (02-15-22 @ 07:07) (98% - 98%)  Vent Data   Intake/ Output   02-14-22 @ 07:01  -  02-15-22 @ 07:00  --------------------------------------------------------  IN: 50 mL / OUT: 2600 mL / NET: -2550 mL      Physical Exam  * General Appearance: Alert, cooperative, interactive, well-groomed, oriented to time, place, and person, in no acute distress  * Head: Normocephalic, without obvious abnormality, atraumatic  * Neck: Supple, symmetrical, trachea midline, no adenopathy;   * Thyroid:  No enlargement/tenderness/nodules; no carotid bruit or JVD  * Lungs: Respirations unlabored, Good bilateral air entry, normal breath sounds (Clear to auscultation bilaterally, no audible wheezes, crackles, or rhonchi)  * Heart: Regular Rate and Rhythm, normal S1 and S2, no audible murmur, rub, or gallop  * Abdomen: Symmetric, non-distended, no scar, soft, non-tender, bowel sounds active all four quadrants, no masses, no organomegaly (no hepatosplenomegaly)  * Extremities: Extremities normal, atraumatic, no cyanosis, no lower extremity pitting edema bilaterally, adequate dorsalis pedis pulses  * Pulses: 2+ and symmetric all extremities  * Skin: Skin color, texture, turgor normal, no rashes or lesions  * Lymph nodes: Cervical, supraclavicular, and axillary nodes normal      Investigations   Laboratory Workup  - CBC:                        14.1   8.36  )-----------( 218      ( 15 Feb 2022 04:30 )             44.7     - Chemistry:  02-15    136  |  91<L>  |  18  ----------------------------<  189<H>  3.4<L>   |  30  |  1.1    Ca    9.0      15 Feb 2022 04:30  Phos  2.8     02-15  Mg     1.7     02-15    TPro  6.4  /  Alb  3.4<L>  /  TBili  1.9<H>  /  DBili  x   /  AST  15  /  ALT  8   /  AlkPhos  85  02-15        Current Medications  Standing Medications  aspirin enteric coated 81 milliGRAM(s) Oral daily  atorvastatin 80 milliGRAM(s) Oral at bedtime  budesonide 160 MICROgram(s)/formoterol 4.5 MICROgram(s) Inhaler 2 Puff(s) Inhalation two times a day  clopidogrel Tablet 75 milliGRAM(s) Oral daily  enoxaparin Injectable 40 milliGRAM(s) SubCutaneous daily  fenofibrate Tablet 145 milliGRAM(s) Oral daily  influenza   Vaccine 0.5 milliLiter(s) IntraMuscular once  insulin lispro (ADMELOG) corrective regimen sliding scale   SubCutaneous Before meals and at bedtime  magnesium oxide 400 milliGRAM(s) Oral three times a day with meals  magnesium sulfate  IVPB 2 Gram(s) IV Intermittent every 2 hours  metoprolol succinate ER 50 milliGRAM(s) Oral daily  pantoprazole    Tablet 40 milliGRAM(s) Oral before breakfast  QUEtiapine 25 milliGRAM(s) Oral at bedtime  sacubitril 97 mG/valsartan 103 mG 1 Tablet(s) Oral two times a day  spironolactone 25 milliGRAM(s) Oral daily  torsemide 20 milliGRAM(s) Oral two times a day    PRN Medications  ALBUTerol    90 MICROgram(s) HFA Inhaler 2 Puff(s) Inhalation every 6 hours PRN Bronchospasm  albuterol/ipratropium for Nebulization 3 milliLiter(s) Nebulizer every 6 hours PRN Shortness of Breath and/or Wheezing  loperamide 2 milliGRAM(s) Oral three times a day PRN Diarrhea    Singles Doses Administered  (ADM OVERRIDE) 1 each &lt;see task&gt; GiveOnce  (ADM OVERRIDE) 1 each &lt;see task&gt; GiveOnce  buMETAnide Injectable 2 milliGRAM(s) IV Push once  furosemide   Injectable 40 milliGRAM(s) IV Push Once  magnesium sulfate  IVPB 2 Gram(s) IV Intermittent Once  magnesium sulfate  IVPB 2 Gram(s) IV Intermittent once  magnesium sulfate  IVPB 2 Gram(s) IV Intermittent once  magnesium sulfate  IVPB 2 Gram(s) IV Intermittent once  magnesium sulfate  IVPB 2 Gram(s) IV Intermittent once  potassium chloride    Tablet ER 20 milliEquivalent(s) Oral every 2 hours  potassium chloride    Tablet ER 40 milliEquivalent(s) Oral once  potassium chloride   Powder 40 milliEquivalent(s) Oral once  potassium chloride  20 mEq/100 mL IVPB 20 milliEquivalent(s) IV Intermittent every 2 hours       Location: [facility]9 (3COVF) 009 A ([facility] F9 3COVF))  Patient Name: WILLIAN MARY  Age: 56y  Gender: Female      Progress Note  This morning patient was seen and examined at bedside.    Today is hospital day 9d.  Mr. Mary is doing fine.   He reports he had a good night sleep.   He is comfortable on 3LPM Nasal Canula (home requirement) and denies shortness of breath or cough.   His appetite is adequate, and he denies nausea or vomiting.   He denies any abdominal pain, diarrhea, or constipation. His last bowel movement was soft and was on 02/14.   He is voiding freely and denies urinary symptoms (dysuria, urgency, frequency, intermittence).    ROS no cp, no sob, comfortable       Vital Signs in the last 24 hours   Vitals Summary T(C): 36.1 (02-15-22 @ 05:21), Max: 36.1 (02-15-22 @ 05:21)  HR: 77 (02-15-22 @ 05:21) (73 - 77)  BP: 112/73 (02-15-22 @ 05:21) (112/73 - 117/72)  RR: 18 (02-15-22 @ 07:07) (18 - 18)  SpO2: 98% (02-15-22 @ 07:07) (98% - 98%)  Vent Data   Intake/ Output   02-14-22 @ 07:01  -  02-15-22 @ 07:00  --------------------------------------------------------  IN: 50 mL / OUT: 2600 mL / NET: -2550 mL      Physical Exam  * General Appearance: Alert, cooperative, interactive, well-groomed, oriented to time, place, and person, in no acute distress  * Head: Normocephalic, without obvious abnormality, atraumatic  * Neck: Supple, symmetrical, trachea midline, no adenopathy;   * Thyroid:  No enlargement/tenderness/nodules; no carotid bruit or JVD  * Lungs: Respirations unlabored, Good bilateral air entry, normal breath sounds (Clear to auscultation bilaterally, no audible wheezes, crackles, or rhonchi)  * Heart: Regular Rate and Rhythm, normal S1 and S2, no audible murmur, rub, or gallop  * Abdomen: Symmetric, non-distended, no scar, soft, non-tender, bowel sounds active all four quadrants, no masses, no organomegaly (no hepatosplenomegaly)  * Extremities: Extremities normal, atraumatic, no cyanosis, no lower extremity pitting edema bilaterally, adequate dorsalis pedis pulses  * Pulses: 2+ and symmetric all extremities  * Skin: Skin color, texture, turgor normal, no rashes or lesions  * Lymph nodes: Cervical, supraclavicular, and axillary nodes normal      Investigations   Laboratory Workup  - CBC:                        14.1   8.36  )-----------( 218      ( 15 Feb 2022 04:30 )             44.7     - Chemistry:  02-15    136  |  91<L>  |  18  ----------------------------<  189<H>  3.4<L>   |  30  |  1.1    Ca    9.0      15 Feb 2022 04:30  Phos  2.8     02-15  Mg     1.7     02-15    TPro  6.4  /  Alb  3.4<L>  /  TBili  1.9<H>  /  DBili  x   /  AST  15  /  ALT  8   /  AlkPhos  85  02-15        Current Medications  Standing Medications  aspirin enteric coated 81 milliGRAM(s) Oral daily  atorvastatin 80 milliGRAM(s) Oral at bedtime  budesonide 160 MICROgram(s)/formoterol 4.5 MICROgram(s) Inhaler 2 Puff(s) Inhalation two times a day  clopidogrel Tablet 75 milliGRAM(s) Oral daily  enoxaparin Injectable 40 milliGRAM(s) SubCutaneous daily  fenofibrate Tablet 145 milliGRAM(s) Oral daily  influenza   Vaccine 0.5 milliLiter(s) IntraMuscular once  insulin lispro (ADMELOG) corrective regimen sliding scale   SubCutaneous Before meals and at bedtime  magnesium oxide 400 milliGRAM(s) Oral three times a day with meals  magnesium sulfate  IVPB 2 Gram(s) IV Intermittent every 2 hours  metoprolol succinate ER 50 milliGRAM(s) Oral daily  pantoprazole    Tablet 40 milliGRAM(s) Oral before breakfast  QUEtiapine 25 milliGRAM(s) Oral at bedtime  sacubitril 97 mG/valsartan 103 mG 1 Tablet(s) Oral two times a day  spironolactone 25 milliGRAM(s) Oral daily  torsemide 20 milliGRAM(s) Oral two times a day    PRN Medications  ALBUTerol    90 MICROgram(s) HFA Inhaler 2 Puff(s) Inhalation every 6 hours PRN Bronchospasm  albuterol/ipratropium for Nebulization 3 milliLiter(s) Nebulizer every 6 hours PRN Shortness of Breath and/or Wheezing  loperamide 2 milliGRAM(s) Oral three times a day PRN Diarrhea    Singles Doses Administered  (ADM OVERRIDE) 1 each &lt;see task&gt; GiveOnce  (ADM OVERRIDE) 1 each &lt;see task&gt; GiveOnce  buMETAnide Injectable 2 milliGRAM(s) IV Push once  furosemide   Injectable 40 milliGRAM(s) IV Push Once  magnesium sulfate  IVPB 2 Gram(s) IV Intermittent Once  magnesium sulfate  IVPB 2 Gram(s) IV Intermittent once  magnesium sulfate  IVPB 2 Gram(s) IV Intermittent once  magnesium sulfate  IVPB 2 Gram(s) IV Intermittent once  magnesium sulfate  IVPB 2 Gram(s) IV Intermittent once  potassium chloride    Tablet ER 20 milliEquivalent(s) Oral every 2 hours  potassium chloride    Tablet ER 40 milliEquivalent(s) Oral once  potassium chloride   Powder 40 milliEquivalent(s) Oral once  potassium chloride  20 mEq/100 mL IVPB 20 milliEquivalent(s) IV Intermittent every 2 hours

## 2022-02-15 NOTE — PROGRESS NOTE ADULT - ASSESSMENT
Assessment and Plan  Case of a 56 year old female patient with multiple comorbidities who presented on 02/06 for worsening shortness of breath on exertion associated with worsening lower limbs, found to have elevated BNP and congested chest x ray, admitted for acute on chronic heart failure exacerbation. Currently hemodynamically stable.      Acute on Chronic HFrEF Exacerbation  Pericardial Effusion and Trace Perihepatic Ascites  Bilateral Pleural Effusions   * TTE 02/06/22 EF 19%, mod PHTN, mild AR, severe TR, mod-severe MR  * Not compliant with PO Lasix 20mg QD due to night time awakening to urinate  * Pro BNP 02/06 4892  * CXR 02/06 pulmonary vascular congestion  * CT Angio Chest PE Protocol w/ IV Cont (02.06.22 @ 14:46) No pulmonary embolus.Cardiomegaly with asymmetric right heart dilatation. Mild/moderate pericardial effusion, slightly increased since prior. Consider correlation with echocardiography as clinically warranted. Small bilateral pleural effusions, slightly increased since prior. Trace perihepatic ascites, partially imaged.    - Heart Failure team on board  - Started on PO Torsemide 20mg BID 02/14  - Increased Entresto to 97/103mg BID on 02/14  - Continue Toprol 50mg QD and Aldactone 25mg PO QD  - EP team on board  --> Outpatient follow up with Dr Soto in 1 month for reassessment of LVEF and if patient will require AICD implant as outpatient  --> Not a candidate for AICD implant at this time as she has not been compliant with medications and needs to be on uninterrupted GDMT for 3 months to assess if HF will improve  --> Patient was given Lifevest in December, she needs to continue wearing this for protection against life threatening arrhythmias. If patient does not have Lifevest with her, [employer] rep can re-evaluate the vest at home when patient is discharged  - Monitor in/out  - Daily weight (standing)  - Monitor SaO2 and O2 requirements  - Salt restricted diet and Fluid restriction to 1.2L per 24 hours  - Monitor telemetry   - Trend electrolytes and keep K>4 and Mg>2      Bilateral Superficial Femoral Artery Occlusion  * VA Duplex Lower Extrem Arterial, Bilat (02.10.22 @ 23:27) Right: Right superficial femoral artery appears to be occluded with no   flow distally. Left: Left superficial femoral artery appears to be occluded with minimal flow distally  - Vascular Team Dr Rhoades on board: no acute surgical intervention      Asymptomatic COVID    * Presented 01/10 with SOB > CHF. Then no fevers with COVID with a positive test and asymptomatic   * 02/13 COVID with a positive test and asymptomatic   * CXR 02/06 pulmonary vascular congestion  * CT Angio Chest PE Protocol w/ IV Cont (02.06.22 @ 14:46) No pulmonary embolus.Cardiomegaly with asymmetric right heart dilatation. Mild/moderate pericardial effusion, slightly increased since prior. Consider correlation with echocardiography as clinically warranted. Small bilateral pleural effusions, slightly increased since prior. Trace perihepatic ascites, partially imaged.    - Infectious Disease Team on board  - No further work up for COVID-19: baseline oxygen requirements and asymptomatic  - No need for isolation: Patient Not contagious      CAD s/p PCI to RCA  * Troponin T, Serum (02.06.22 @ 10:50) 0.02 ng/mL -> 0.02 02/07 -> 02/10 <0.01  * Lipid Profile (11.10.21 @ 18:31)    Cholesterol, Serum: 89 mg/dL    Triglycerides, Serum: 174 mg/dL    HDL Cholesterol, Serum: 24 mg/dL    Non HDL Cholesterol: 65    LDL Cholesterol Calculated: 40 mg/dL    - Continue Aspirin 81mg QD and Plavix 75mg QD  - Continue Atorvastatin 80 mg QD  - Continue Fenofibrate 145mg TID      COPD on 3L Home O2  CORNELIUS on CPAP  Moderate Pulmonary HTN  * Stable  * TTE 02/06/22 EF 19%, mod PHTN, mild AR, severe TR, mod-severe MR  - Monitor SaO2  - Continue Duoneb 3mL Q6h PRN  - Continue Albuterol 2 puff Q6h PRN  - Continue Symbicort 2 puffs BID      Hypertension  - Monitor BP closely  - Continue Toprol 50mg QD      History of Stroke with Residual Left Leg Weakness  * Lipid Profile (11.10.21 @ 18:31)    Cholesterol, Serum: 89 mg/dL    Triglycerides, Serum: 174 mg/dL    HDL Cholesterol, Serum: 24 mg/dL    Non HDL Cholesterol: 65    LDL Cholesterol Calculated: 40 mg/dL    - Continue Aspirin 81mg QD and Plavix 75mg QD  - Continue Atorvastatin 80 mg QD  - Continue Fenofibrate 145mg TID      DM II  * A1C with Estimated Average Glucose (02.07.22 @ 06:32)    A1C with Estimated Average Glucose Result: 6.8  - Monitor POCT premeals and at bedtime  - Continue Apidra Sliding Scale A for now      Others  - DVT Prophylaxis: Lovenox 40mg Subcutaneously daily  - GI Prophylaxis: Pantoprazole 40mg PO QD  - Diet: DASH/ TLC  - Code Status: Full      Barriers to learning: NO  Discharge Planning: Patient will be discharged once stable   Plan was communicated with patient and medical team       Alicja Barnett MD  PGY - 2 Internal Medicine   [facility]

## 2022-02-15 NOTE — CONSULT NOTE ADULT - SUBJECTIVE AND OBJECTIVE BOX
WILLIAN MARY  56y, Female  Allergy: No Known Allergies      All historical available data reviewed.    HPI:  57 YO F w/ PMHx of COPD (on 3L home O2), CORNELIUS on CPAP, HFrEF (15-20% on TTE 11/2021), severe pulmonary HTN, CAD s/p PCI to RCA, HTN/HLD, CVA with residual LLE weakness, T2DM, recently quit smoking (1 month ago) presented for shortness of breath that worsened last night.   As per the patient she was noticing worsening LE swelling and increased SOB for the last few days. Last last she had chinese food and then her SOB worsened. Per EMS, patient was placed on NRB 10L and one albuterol treatment with improvement en route. Pt was recently admitted in Jan 2021 for b/l lower extremity edema and was found to be COVID-19 positive at the time. Reports being vaccinated against COVID-19.   Denies fever/chills. No chest pain, palpitations, abd pain, N/V/D. Has cough with sputum at baseline, denies any change in cough or sputum  Pt wheelchair bound at baseline.  In the ED VS were sig for a HR of 121 bpm. She was placed on BiPAP and then transitioned to NC  CT angio showed mild-mod pericardial effusion. ED spoke with CT surgery and they will evaluate  (06 Feb 2022 16:33)  ID called for need for isolation of COVID-19     FAMILY HISTORY:  FH: diabetes mellitus (Father, Mother)      PAST MEDICAL & SURGICAL HISTORY:  Hypertension    Hyperlipidemia    Anxiety and depression    COPD, severe    CHF (congestive heart failure)    Cerebrovascular accident (CVA)  Multiple    Type 2 diabetes mellitus    CKD (chronic kidney disease), stage II    No significant past surgical history          VITALS:  T(F): 97, Max: 97.2 (02-14-22 @ 11:01)  HR: 77  BP: 112/73  RR: 18Vital Signs Last 24 Hrs  T(C): 36.1 (15 Feb 2022 05:21), Max: 36.2 (14 Feb 2022 11:01)  T(F): 97 (15 Feb 2022 05:21), Max: 97.2 (14 Feb 2022 11:01)  HR: 77 (15 Feb 2022 05:21) (73 - 77)  BP: 112/73 (15 Feb 2022 05:21) (108/64 - 117/72)  BP(mean): --  RR: 18 (15 Feb 2022 07:07) (18 - 18)  SpO2: 98% (15 Feb 2022 07:07) (97% - 98%)    TESTS & MEASUREMENTS:                        14.1   8.36  )-----------( 218      ( 15 Feb 2022 04:30 )             44.7     02-15    136  |  91<L>  |  18  ----------------------------<  189<H>  3.4<L>   |  30  |  1.1    Ca    9.0      15 Feb 2022 04:30  Phos  2.8     02-15  Mg     1.7     02-15    TPro  6.4  /  Alb  3.4<L>  /  TBili  1.9<H>  /  DBili  x   /  AST  15  /  ALT  8   /  AlkPhos  85  02-15    LIVER FUNCTIONS - ( 15 Feb 2022 04:30 )  Alb: 3.4 g/dL / Pro: 6.4 g/dL / ALK PHOS: 85 U/L / ALT: 8 U/L / AST: 15 U/L / GGT: x             GI PCR Panel, Stool (collected 02-10-22 @ 22:18)  Source: .Stool Feces  Final Report (02-11-22 @ 15:09):    GI PCR Results: NOT detected    *******Please Note:*******    GI panel PCR evaluates for:    Campylobacter, Plesiomonas shigelloides, Salmonella,    Vibrio, Yersinia enterocolitica, Enteroaggregative    Escherichia coli (EAEC), Enteropathogenic E.coli (EPEC),    Enterotoxigenic E. coli (ETEC) lt/st, Shiga-like    toxin-producing E. coli (STEC) stx1/stx2,    Shigella/ Enteroinvasive E. coli (EIEC), Cryptosporidium,    Cyclospora cayetanensis, Entamoeba histolytica,    Giardia lamblia, Adenovirus F 40/41, Astrovirus,    Norovirus GI/GII, Rotavirus A, Sapovirus            RADIOLOGY & ADDITIONAL TESTS:  Personal review of radiological diagnostics performed  Echo and EKG results noted when applicable.     MEDICATIONS:  ALBUTerol    90 MICROgram(s) HFA Inhaler 2 Puff(s) Inhalation every 6 hours PRN  albuterol/ipratropium for Nebulization 3 milliLiter(s) Nebulizer every 6 hours PRN  aspirin enteric coated 81 milliGRAM(s) Oral daily  atorvastatin 80 milliGRAM(s) Oral at bedtime  budesonide 160 MICROgram(s)/formoterol 4.5 MICROgram(s) Inhaler 2 Puff(s) Inhalation two times a day  clopidogrel Tablet 75 milliGRAM(s) Oral daily  enoxaparin Injectable 40 milliGRAM(s) SubCutaneous daily  fenofibrate Tablet 145 milliGRAM(s) Oral daily  influenza   Vaccine 0.5 milliLiter(s) IntraMuscular once  insulin lispro (ADMELOG) corrective regimen sliding scale   SubCutaneous Before meals and at bedtime  loperamide 2 milliGRAM(s) Oral three times a day PRN  magnesium oxide 400 milliGRAM(s) Oral three times a day with meals  metoprolol succinate ER 50 milliGRAM(s) Oral daily  pantoprazole    Tablet 40 milliGRAM(s) Oral before breakfast  QUEtiapine 25 milliGRAM(s) Oral at bedtime  sacubitril 97 mG/valsartan 103 mG 1 Tablet(s) Oral two times a day  spironolactone 25 milliGRAM(s) Oral daily  torsemide 20 milliGRAM(s) Oral two times a day      ANTIBIOTICS:

## 2022-02-15 NOTE — CONSULT NOTE ADULT - CONSULT REASON
bilateral femoral artery occlusions
Heart Failure
Pericardial Effusion
EF 19%
COVID-19 : need for isolation ?

## 2022-02-15 NOTE — CONSULT NOTE ADULT - ASSESSMENT
56y old Female who presents with a chief complaint of Shortness of breath     IMPRESSION;  COVID with a positive test and asymptomatic   Presented 1/10 with SOB > CHF. Then no fevers with COVID with a positive test and asymptomatic   2/13 COVID with a positive test and asymptomatic   CXR no GGO  2/7 CT b/l pleural effusions, no GGO    RECOMMENDATIONS;  No further w/u for COVID-19   Not contagious  No need for isolation  Please do not hesitate to recall ID if any questions arise either through [email or web address] or through [employer]

## 2022-02-15 NOTE — PROGRESS NOTE ADULT - ASSESSMENT
Acute on chronic systolic HF / Fluid overload / pulmonary HTN / COPD / DM2 / Anxiety     Patient is euvolemic on exam  Continue Torsemide 20mg PO BID  Continue Entresto to 97mg-103mg BID   Continue metoprolol XL 50 mg daily   Increase to Spironolactone 50 mg daily   Get BMP daily  Maintain potassium >4.0, Mg >2.1  Strict intake and output  PT /  Incentive inspirometer  Daily weight   Plan discussed with primary team  F/u in heart failure office in 1 week

## 2022-02-15 NOTE — PROGRESS NOTE ADULT - ATTENDING COMMENTS
Patient seen and examined independently. Agree with resident note/ history / physical exam and plan of care with following exceptions/additions/updates. Case discussed with patient/pt decision maker, house-staff and nursing. My notes supersedes the resident's notes in case of discrepancies.    a/p  # Acute on chronic HFrEF , cont medical management, fu heart failure and EP , may need aicd in the future.   # DM cont insulin per protocol   # hx of cva, gait disturbance, has HHA   # covid positive, per infection control pt no need for isolation    #Progress Note Handoff  Pending (specify):  clinical improvement   Family discussion: cont meds  Disposition: Home with HHA.

## 2022-02-15 NOTE — CONSULT NOTE ADULT - ASSESSMENT
Assessment: 55 YO F w/ PMHx of COPD (on 3L home O2), CORNELIUS on CPAP, HFrEF (15-20% on TTE 11/2021), severe pulmonary HTN, CAD s/p PCI to RCA, HTN/HLD, CVA with residual LLE weakness, T2DM, recently quit smoking (1 month ago) presented for shortness of breath. Patient still with EF 19%, not compliant with medications    Impression:  HFrEF 19%  COPD  CORNELIUS on CPAP  Severe Pulm HTN  CAD sp PCI to RCA  HTN  HLD  DM  Hx CVA    Plan:  - Patient was given Lifevest in December, she needs to continue wearing this for protection against life threatening arrhythmias  - Patient is not a candidate for AICD implant at this time as she has not been compliant with medications and needs to be on uninterrupted GDMT for 3 months to assess if HF will improve  - If patient does not have Lifevest with her, [employer] rep can re-evaluate the vest at home when patient is discharged  - Cont tele monitoring  - Cont GDMT  - Patient should follow up with Dr Soto in 1 month for reassessment of LVEF and if patient will require AICD implant as outpatient

## 2022-02-15 NOTE — CONSULT NOTE ADULT - SUBJECTIVE AND OBJECTIVE BOX
Patient is a 56y old  Female who presents with a chief complaint of Shortness of breath (15 Feb 2022 08:38)    HPI: Patient is a  57 YO F w/ PMHx of COPD (on 3L home O2), CORNELIUS on CPAP, HFrEF (15-20% on TTE 11/2021), severe pulmonary HTN, CAD s/p PCI to RCA, HTN/HLD, CVA with residual LLE weakness, T2DM, recently quit smoking (1 month ago) presented for shortness of breath that worsened last night. As per the patient she was noticing worsening LE swelling and increased SOB for the last few days. Last last she had chinese food and then her SOB worsened. Per EMS, patient was placed on NRB 10L and one albuterol treatment with improvement en route. Pt was recently admitted in Jan 2021 for b/l lower extremity edema and was found to be COVID-19 positive at the time. Reports being vaccinated against COVID-19. Denies fever/chills. No chest pain, palpitations, abd pain, N/V/D. Has cough with sputum at baseline, denies any change in cough or sputum. Pt wheelchair bound at baseline. Patient has been seen by EP in the past and given lifevest however not compliant with medications.    PAST MEDICAL & SURGICAL HISTORY:  Hypertension    Hyperlipidemia    Anxiety and depression    COPD, severe    CHF (congestive heart failure)    Cerebrovascular accident (CVA)  Multiple    Type 2 diabetes mellitus    CKD (chronic kidney disease), stage II    No significant past surgical history    PREVIOUS DIAGNOSTIC TESTING:      ECHO  FINDINGS:  < from: TTE Echo Complete w/o Contrast w/ Doppler (02.06.22 @ 17:21) >  Summary:   1. LV Ejection Fraction by Urena's Method with a biplane EF of 19 %.   2. Severely decreased global left ventricular systolic function.   3. Dilated RV with moderately reduced systolic function.   4. Moderately enlarged left atrium.   5. Severely enlarged right atrium.   6. Sclerotic aorticvalve with normal opening.   7. Mild aortic regurgitation.   8. The mitral valve leaflets are tethered due to reduced systolic   function and elevated LVDP.   9. Moderate-to-severe mitral regurgitation.  10. Severe tricuspid regurgitation.  11. Estimated pulmonary artery systolic pressure is 53.7 mmHg assuming a   right atrial pressure of 15 mmHg, which is consistent with moderate   pulmonary hypertension.  12. Small pericardial effusion adjacent to the right atrium.    < end of copied text >    STRESS  FINDINGS:  < from: NM Nuclear Stress Pharmacologic Multiple (06.16.21 @ 12:58) >  Impression:  1. MODERATE REVERSIBLE DEFECT IN THE SEPTUM, ANTERIOR AND INFERIOR WALL OF THE LEFT VENTRICLE CONSISTENT WITH ISCHEMIA.  2. DILATED LEFT VENTRICLE WITH MARKED GLOBAL HYPOKINESIS. THERE IS INCOMPLETE THICKENING OF THE SEPTUM ANTERIOR AND INFERIOR WALL OF THE LEFT VENTRICLE RAISING QUESTION OF HIBERNATING MYOCARDIUM. THE LATERAL WALL OF THE LEFT VENTRICLE THICKENS NORMALLY.  3. LEFT VENTRICULAR EJECTION FRACTION OF <20 % WHICH IS VERY LOW. ECHOCARDIOGRAPHIC ESTIMATED EJECTION FRACTION 6/14/2021 WAS 35 TO 40%.    CATHETERIZATION  FINDINGS:  < from: Cardiac Cath Lab - Adult (06.21.21 @ 11:51) >  CORONARY CIRCULATION: The coronary circulation is right dominant. There was    severe 1-vessel coronary artery disease (RCA). Left main: Normal. LAD:    Angiography showed mild atherosclerosis with no flow limiting lesions. 1st    diagonal: Angiography showed mild atherosclerosis with no flow limiting    lesions. Circumflex: Angiography showed minor luminal irregularities with    no flow limiting lesions. 1st obtuse marginal: Normal. Proximal RCA:    Angiography showed mild atherosclerosisMid RCA: There was a diffuse 90 %    stenosis at a site with no prior intervention. This is a likely culprit    for the patient's clinical presentation. An intervention was performed.    Distal RCA: Angiography showed minor luminal irregularities with no flow    limiting lesions. Right PDA: Normal.    < end of copied text >    ELECTROPHYSIOLOGY STUDY  FINDINGS:    CAROTID ULTRASOUND:  FINDINGS    VENOUS DUPLEX SCAN:  FINDINGS:    CHEST CT PULMONARY ANGIO with IV Contrast:  FINDINGS:    MEDICATIONS  (STANDING):  aspirin enteric coated 81 milliGRAM(s) Oral daily  atorvastatin 80 milliGRAM(s) Oral at bedtime  budesonide 160 MICROgram(s)/formoterol 4.5 MICROgram(s) Inhaler 2 Puff(s) Inhalation two times a day  clopidogrel Tablet 75 milliGRAM(s) Oral daily  enoxaparin Injectable 40 milliGRAM(s) SubCutaneous daily  fenofibrate Tablet 145 milliGRAM(s) Oral daily  influenza   Vaccine 0.5 milliLiter(s) IntraMuscular once  insulin lispro (ADMELOG) corrective regimen sliding scale   SubCutaneous Before meals and at bedtime  magnesium oxide 400 milliGRAM(s) Oral three times a day with meals  magnesium sulfate  IVPB 2 Gram(s) IV Intermittent every 2 hours  metoprolol succinate ER 50 milliGRAM(s) Oral daily  pantoprazole    Tablet 40 milliGRAM(s) Oral before breakfast  potassium chloride    Tablet ER 20 milliEquivalent(s) Oral every 2 hours  QUEtiapine 25 milliGRAM(s) Oral at bedtime  sacubitril 97 mG/valsartan 103 mG 1 Tablet(s) Oral two times a day  spironolactone 25 milliGRAM(s) Oral daily  torsemide 20 milliGRAM(s) Oral two times a day    MEDICATIONS  (PRN):  ALBUTerol    90 MICROgram(s) HFA Inhaler 2 Puff(s) Inhalation every 6 hours PRN Bronchospasm  albuterol/ipratropium for Nebulization 3 milliLiter(s) Nebulizer every 6 hours PRN Shortness of Breath and/or Wheezing  loperamide 2 milliGRAM(s) Oral three times a day PRN Diarrhea      FAMILY HISTORY:  FH: diabetes mellitus (Father, Mother)    SOCIAL HISTORY: No smoking, ETOH or illicit drug use    Past Surgical History: None    Allergies:  No Known Allergies      REVIEW OF SYSTEMS:  CONSTITUTIONAL: No fever, weight loss, chills, shakes, or fatigue  RESPIRATORY: No cough, wheezing, hemoptysis, or shortness of breath  CARDIOVASCULAR: No chest pain, dyspnea, palpitations, dizziness, syncope, paroxysmal nocturnal dyspnea, orthopnea, or arm or leg swelling  GASTROINTESTINAL: No abdominal  or epigastric pain, nausea, vomiting, hematemesis, diarrhea, constipation, melena or bright red blood.  NEUROLOGICAL: No headaches, memory loss, slurred speech, limb weakness, loss of strength, numbness, or tremors  MUSCULOSKELETAL: No joint pain or swelling, muscle, back, or extremity pain      Vital Signs Last 24 Hrs  T(C): 36.1 (15 Feb 2022 05:21), Max: 36.1 (15 Feb 2022 05:21)  T(F): 97 (15 Feb 2022 05:21), Max: 97 (15 Feb 2022 05:21)  HR: 77 (15 Feb 2022 05:21) (73 - 77)  BP: 112/73 (15 Feb 2022 05:21) (112/73 - 117/72)  BP(mean): --  RR: 18 (15 Feb 2022 07:07) (18 - 18)  SpO2: 98% (15 Feb 2022 07:07) (98% - 98%)    PHYSICAL EXAM: No PE to decrease spread of COVID 19    INTERPRETATION OF TELEMETRY: NSR 83 bpm with LBBB and PVCs    ECG:  < from: 12 Lead ECG (02.06.22 @ 12:21) >  Ventricular Rate 116 BPM    Atrial Rate 116 BPM    P-R Interval 148 ms    QRS Duration 148 ms    Q-T Interval 346 ms    QTC Calculation(Bazett) 480 ms    R Axis 162 degrees    T Axis -11 degrees    Diagnosis Line Sinus tachycardia  Right axis deviation  Non-specific intra-ventricular conduction delay  Abnormal ECG    Confirmed by Elenita Combs MD ([id number]) on 2/6/2022 4:06:55 PM    < end of copied text >      I&O's Detail    14 Feb 2022 07:01  -  15 Feb 2022 07:00  --------------------------------------------------------  IN:    Bumetanide: 50 mL  Total IN: 50 mL    OUT:    Voided (mL): 2600 mL  Total OUT: 2600 mL    Total NET: -2550 mL          LABS:                        14.1   8.36  )-----------( 218      ( 15 Feb 2022 04:30 )             44.7     02-15    136  |  91<L>  |  18  ----------------------------<  189<H>  3.4<L>   |  30  |  1.1    Ca    9.0      15 Feb 2022 04:30  Phos  2.8     02-15  Mg     1.7     02-15    TPro  6.4  /  Alb  3.4<L>  /  TBili  1.9<H>  /  DBili  x   /  AST  15  /  ALT  8   /  AlkPhos  85  02-15            BNP  I&O's Detail    14 Feb 2022 07:01  -  15 Feb 2022 07:00  --------------------------------------------------------  IN:    Bumetanide: 50 mL  Total IN: 50 mL    OUT:    Voided (mL): 2600 mL  Total OUT: 2600 mL    Total NET: -2550 mL        Daily     Daily     RADIOLOGY & ADDITIONAL STUDIES:

## 2022-02-15 NOTE — PROGRESS NOTE ADULT - ATTENDING COMMENTS
Euvolemic on exam with good urine output on PO diuretics. Increase spironolactone to 50 mg daily for hypokalemia. OK for discharge from HF perspective. Importance of medication and diuretic compliance discussed at length with patient yesterday. Will arrange f/u in 1-2 weeks with Dr. Joo Suggs.

## 2022-02-15 NOTE — PROGRESS NOTE ADULT - ATTENDING SUPERVISION STATEMENT
Resident
ACP
Fellow
Resident
S/P tonsillectomy

## 2022-02-16 ENCOUNTER — TRANSCRIPTION ENCOUNTER (OUTPATIENT)
Age: 57
End: 2022-02-16

## 2022-02-16 LAB
ALBUMIN SERPL ELPH-MCNC: 3.5 G/DL — SIGNIFICANT CHANGE UP (ref 3.5–5.2)
ALP SERPL-CCNC: 77 U/L — SIGNIFICANT CHANGE UP (ref 30–115)
ALT FLD-CCNC: 9 U/L — SIGNIFICANT CHANGE UP (ref 0–41)
ANION GAP SERPL CALC-SCNC: 14 MMOL/L — SIGNIFICANT CHANGE UP (ref 7–14)
AST SERPL-CCNC: 18 U/L — SIGNIFICANT CHANGE UP (ref 0–41)
BASOPHILS # BLD AUTO: 0.12 K/UL — SIGNIFICANT CHANGE UP (ref 0–0.2)
BASOPHILS NFR BLD AUTO: 1.8 % — HIGH (ref 0–1)
BILIRUB SERPL-MCNC: 1.5 MG/DL — HIGH (ref 0.2–1.2)
BUN SERPL-MCNC: 19 MG/DL — SIGNIFICANT CHANGE UP (ref 10–20)
CALCIUM SERPL-MCNC: 9.1 MG/DL — SIGNIFICANT CHANGE UP (ref 8.5–10.1)
CHLORIDE SERPL-SCNC: 92 MMOL/L — LOW (ref 98–110)
CO2 SERPL-SCNC: 33 MMOL/L — HIGH (ref 17–32)
CREAT SERPL-MCNC: 1.2 MG/DL — SIGNIFICANT CHANGE UP (ref 0.7–1.5)
EOSINOPHIL # BLD AUTO: 0.12 K/UL — SIGNIFICANT CHANGE UP (ref 0–0.7)
EOSINOPHIL NFR BLD AUTO: 1.8 % — SIGNIFICANT CHANGE UP (ref 0–8)
GLUCOSE BLDC GLUCOMTR-MCNC: 120 MG/DL — HIGH (ref 70–99)
GLUCOSE BLDC GLUCOMTR-MCNC: 123 MG/DL — HIGH (ref 70–99)
GLUCOSE BLDC GLUCOMTR-MCNC: 170 MG/DL — HIGH (ref 70–99)
GLUCOSE SERPL-MCNC: 116 MG/DL — HIGH (ref 70–99)
HCT VFR BLD CALC: 43.5 % — SIGNIFICANT CHANGE UP (ref 37–47)
HGB BLD-MCNC: 13.6 G/DL — SIGNIFICANT CHANGE UP (ref 12–16)
LYMPHOCYTES # BLD AUTO: 1.68 K/UL — SIGNIFICANT CHANGE UP (ref 1.2–3.4)
LYMPHOCYTES # BLD AUTO: 25.7 % — SIGNIFICANT CHANGE UP (ref 20.5–51.1)
MAGNESIUM SERPL-MCNC: 1.8 MG/DL — SIGNIFICANT CHANGE UP (ref 1.8–2.4)
MCHC RBC-ENTMCNC: 28.6 PG — SIGNIFICANT CHANGE UP (ref 27–31)
MCHC RBC-ENTMCNC: 31.3 G/DL — LOW (ref 32–37)
MCV RBC AUTO: 91.4 FL — SIGNIFICANT CHANGE UP (ref 81–99)
MONOCYTES # BLD AUTO: 0.29 K/UL — SIGNIFICANT CHANGE UP (ref 0.1–0.6)
MONOCYTES NFR BLD AUTO: 4.4 % — SIGNIFICANT CHANGE UP (ref 1.7–9.3)
NEUTROPHILS # BLD AUTO: 4.04 K/UL — SIGNIFICANT CHANGE UP (ref 1.4–6.5)
NEUTROPHILS NFR BLD AUTO: 61.9 % — SIGNIFICANT CHANGE UP (ref 42.2–75.2)
PHOSPHATE SERPL-MCNC: 3.4 MG/DL — SIGNIFICANT CHANGE UP (ref 2.1–4.9)
PLATELET # BLD AUTO: 193 K/UL — SIGNIFICANT CHANGE UP (ref 130–400)
POTASSIUM SERPL-MCNC: 4.4 MMOL/L — SIGNIFICANT CHANGE UP (ref 3.5–5)
POTASSIUM SERPL-SCNC: 4.4 MMOL/L — SIGNIFICANT CHANGE UP (ref 3.5–5)
PROT SERPL-MCNC: 6.5 G/DL — SIGNIFICANT CHANGE UP (ref 6–8)
RBC # BLD: 4.76 M/UL — SIGNIFICANT CHANGE UP (ref 4.2–5.4)
RBC # FLD: 21.2 % — HIGH (ref 11.5–14.5)
SODIUM SERPL-SCNC: 139 MMOL/L — SIGNIFICANT CHANGE UP (ref 135–146)
WBC # BLD: 6.52 K/UL — SIGNIFICANT CHANGE UP (ref 4.8–10.8)
WBC # FLD AUTO: 6.52 K/UL — SIGNIFICANT CHANGE UP (ref 4.8–10.8)

## 2022-02-16 PROCEDURE — 99238 HOSP IP/OBS DSCHRG MGMT 30/<: CPT

## 2022-02-16 RX ORDER — ATORVASTATIN CALCIUM 80 MG/1
1 TABLET, FILM COATED ORAL
Qty: 0 | Refills: 0 | DISCHARGE
Start: 2022-02-16

## 2022-02-16 RX ORDER — SACUBITRIL AND VALSARTAN 24; 26 MG/1; MG/1
1 TABLET, FILM COATED ORAL
Qty: 60 | Refills: 0
Start: 2022-02-16 | End: 2022-03-17

## 2022-02-16 RX ORDER — FENOFIBRATE,MICRONIZED 130 MG
1 CAPSULE ORAL
Qty: 0 | Refills: 0 | DISCHARGE
Start: 2022-02-16

## 2022-02-16 RX ORDER — SPIRONOLACTONE 25 MG/1
2 TABLET, FILM COATED ORAL
Qty: 60 | Refills: 0
Start: 2022-02-16 | End: 2022-03-17

## 2022-02-16 RX ORDER — SPIRONOLACTONE 25 MG/1
50 TABLET, FILM COATED ORAL DAILY
Refills: 0 | Status: DISCONTINUED | OUTPATIENT
Start: 2022-02-16 | End: 2022-02-16

## 2022-02-16 RX ADMIN — Medication 145 MILLIGRAM(S): at 12:49

## 2022-02-16 RX ADMIN — BUDESONIDE AND FORMOTEROL FUMARATE DIHYDRATE 2 PUFF(S): 160; 4.5 AEROSOL RESPIRATORY (INHALATION) at 09:23

## 2022-02-16 RX ADMIN — SACUBITRIL AND VALSARTAN 1 TABLET(S): 24; 26 TABLET, FILM COATED ORAL at 05:23

## 2022-02-16 RX ADMIN — CLOPIDOGREL BISULFATE 75 MILLIGRAM(S): 75 TABLET, FILM COATED ORAL at 12:49

## 2022-02-16 RX ADMIN — Medication 50 MILLIGRAM(S): at 05:23

## 2022-02-16 RX ADMIN — MAGNESIUM OXIDE 400 MG ORAL TABLET 400 MILLIGRAM(S): 241.3 TABLET ORAL at 12:50

## 2022-02-16 RX ADMIN — MAGNESIUM OXIDE 400 MG ORAL TABLET 400 MILLIGRAM(S): 241.3 TABLET ORAL at 08:31

## 2022-02-16 RX ADMIN — Medication 81 MILLIGRAM(S): at 12:49

## 2022-02-16 RX ADMIN — Medication 1: at 12:31

## 2022-02-16 RX ADMIN — ENOXAPARIN SODIUM 40 MILLIGRAM(S): 100 INJECTION SUBCUTANEOUS at 12:53

## 2022-02-16 RX ADMIN — PANTOPRAZOLE SODIUM 40 MILLIGRAM(S): 20 TABLET, DELAYED RELEASE ORAL at 05:23

## 2022-02-16 RX ADMIN — SPIRONOLACTONE 25 MILLIGRAM(S): 25 TABLET, FILM COATED ORAL at 05:23

## 2022-02-16 RX ADMIN — Medication 20 MILLIGRAM(S): at 05:23

## 2022-02-16 NOTE — DISCHARGE NOTE NURSING/CASE MANAGEMENT/SOCIAL WORK - NSDCFUADDAPPT_GEN_ALL_CORE_FT
APPTS ARE READY TO BE MADE: [ x] YES    Best Family or Patient Contact (if needed): [phone]    Additional Information about above appointments (if needed):    1: Dr. Joo PEDRO   2: Dr. Charles Sharp EP  3:     Other comments or requests:

## 2022-02-16 NOTE — DISCHARGE NOTE PROVIDER - NSDCMRMEDTOKEN_GEN_ALL_CORE_FT
Albuterol (Eqv-ProAir HFA) 90 mcg/inh inhalation aerosol: 2 puff(s) inhaled every 6 hours, As Needed  aspirin 81 mg oral delayed release tablet: 1 tab(s) orally once a day   atorvastatin 80 mg oral tablet: 1 tab(s) orally once a day (at bedtime)  budesonide-formoterol 160 mcg-4.5 mcg/inh inhalation aerosol: 2 puff(s) inhaled 2 times a day   dapagliflozin 10 mg oral tablet: 1 tab(s) orally once a day   ergocalciferol 1.25 mg (50,000 intl units) oral capsule: 1 cap(s) orally every 7 days   fenofibrate 145 mg oral tablet: 1 tab(s) orally once a day  Lovaza 1000 mg oral capsule: 2 cap(s) orally 2 times a day  magnesium oxide 400 mg oral tablet: 1 tab(s) orally 3 times a day (with meals)  metFORMIN 1000 mg oral tablet: 1 tab(s) orally 2 times a day Do not take until 6/23  Metoprolol Succinate ER 50 mg oral tablet, extended release: 1 tab(s) orally once a day   pantoprazole 40 mg oral delayed release tablet: 1 tab(s) orally once a day (before a meal)  Plavix 75 mg oral tablet: 1 tab(s) orally once a day  QUEtiapine 25 mg oral tablet: 1 tab(s) orally once a day (at bedtime)  sacubitril-valsartan 97 mg-103 mg oral tablet: 1 tab(s) orally 2 times a day  spironolactone 25 mg oral tablet: 2 tab(s) orally once a day  torsemide 20 mg oral tablet: 1 tab(s) orally 2 times a day

## 2022-02-16 NOTE — DISCHARGE NOTE NURSING/CASE MANAGEMENT/SOCIAL WORK - PATIENT PORTAL LINK FT
You can access the FollowMyHealth Patient Portal offered by [facility] by registering at the following website: [email or web address]. By joining [facility]’s FollowMyHealth portal, you will also be able to view your health information using other applications (apps) compatible with our system.

## 2022-02-16 NOTE — DISCHARGE NOTE PROVIDER - NSDCCPCAREPLAN_GEN_ALL_CORE_FT
PRINCIPAL DISCHARGE DIAGNOSIS  Diagnosis: Acute on chronic systolic heart failure  Assessment and Plan of Treatment: You were diagnosed with Heartfailure exacerbation. You were given medication to help you remove the fluids from your body that was causing you to be short of breath and lower extremity swelling. You were seen by the  Heart Failure team on board  which follow up in one week. You were evaluated for a defibulator but Outpatient follow up with Dr Soto in 1 month for reassessment of LVEF and if patient will require AICD implant as outpatient You were given Lifevest in December, you needs to continue wearing this for protection against life threatening arrhythmias. If patient does not have Lifevest with her, A rep can re-evaluate the vest at home when you are  discharged.      SECONDARY DISCHARGE DIAGNOSES  Diagnosis: PVD (peripheral vascular disease)  Assessment and Plan of Treatment: Please follow up with Vascular outpatient.

## 2022-02-16 NOTE — DISCHARGE NOTE PROVIDER - HOSPITAL COURSE
56 year old female patient with multiple comorbidities who presented on 02/06 for worsening shortness of breath on exertion associated with worsening lower limbs, found to have elevated BNP and congested chest x ray, admitted for acute on chronic heart failure exacerbation. Currently hemodynamically stable.    Acute on Chronic HFrEF Exacerbation  Pericardial Effusion and Trace Perihepatic Ascites  Bilateral Pleural Effusions   * TTE 02/06/22 EF 19%, mod PHTN, mild AR, severe TR, mod-severe MR  * Not compliant with PO Lasix 20mg QD due to night time awakening to urinate  * Pro BNP 02/06 4892  * CXR 02/06 pulmonary vascular congestion  * CT Angio Chest PE Protocol w/ IV Cont (02.06.22 @ 14:46) No pulmonary embolus.Cardiomegaly with asymmetric right heart dilatation. Mild/moderate pericardial effusion, slightly increased since prior. Consider correlation with echocardiography as clinically warranted. Small bilateral pleural effusions, slightly increased since prior. Trace perihepatic ascites, partially imaged.  - sending home on PO Torsemide 20mg BID 02/14  - continue Entresto to 97/103mg BID on 02/14  - Continue Toprol 50mg QD and Aldactone 25mg PO QD  - Heart Failure team on board- follow up in one week  - EP team on board  --> Outpatient follow up with Dr Soto in 1 month for reassessment of LVEF and if patient will require AICD implant as outpatient  --> Not a candidate for AICD implant at this time as she has not been compliant with medications and needs to be on uninterrupted GDMT for 3 months to assess if HF will improve  --> Patient was given Lifevest in December, she needs to continue wearing this for protection against life threatening arrhythmias. If patient does not have Lifevest with her, [employer] rep can re-evaluate the vest at home when patient is discharged    Bilateral Superficial Femoral Artery Occlusion  * VA Duplex Lower Extrem Arterial, Bilat (02.10.22 @ 23:27) Right: Right superficial femoral artery appears to be occluded with no   flow distally. Left: Left superficial femoral artery appears to be occluded with minimal flow distally  - Vascular Team Dr Rhoades on board: no acute surgical intervention  - follow up outpatient       56 year old female patient with multiple comorbidities who presented on 02/06 for worsening shortness of breath on exertion associated with worsening lower limbs, found to have elevated BNP and congested chest x ray, admitted for acute on chronic heart failure exacerbation. Currently hemodynamically stable.    Acute on Chronic HFrEF Exacerbation  Pericardial Effusion and Trace Perihepatic Ascites  Bilateral Pleural Effusions   * TTE 02/06/22 EF 19%, mod PHTN, mild AR, severe TR, mod-severe MR  * Not compliant with PO Lasix 20mg QD due to night time awakening to urinate  * Pro BNP 02/06 4892  * CXR 02/06 pulmonary vascular congestion  * CT Angio Chest PE Protocol w/ IV Cont (02.06.22 @ 14:46) No pulmonary embolus.Cardiomegaly with asymmetric right heart dilatation. Mild/moderate pericardial effusion, slightly increased since prior. Consider correlation with echocardiography as clinically warranted. Small bilateral pleural effusions, slightly increased since prior. Trace perihepatic ascites, partially imaged.  - sending home on PO Torsemide 20mg BID 02/14  - continue Entresto to 97/103mg BID on 02/14  - Continue Toprol 50mg QD and Aldactone 25mg PO QD  - Heart Failure team on board- follow up in one week  - EP team on board  --> Outpatient follow up with Dr Soto in 1 month for reassessment of LVEF and if patient will require AICD implant as outpatient  --> Not a candidate for AICD implant at this time as she has not been compliant with medications and needs to be on uninterrupted GDMT for 3 months to assess if HF will improve  --> Patient was given Lifevest in December, she needs to continue wearing this for protection against life threatening arrhythmias. If patient does not have Lifevest with her, [employer] rep can re-evaluate the vest at home when patient is discharged    Bilateral Superficial Femoral Artery Occlusion  * VA Duplex Lower Extrem Arterial, Bilat (02.10.22 @ 23:27) Right: Right superficial femoral artery appears to be occluded with no   flow distally. Left: Left superficial femoral artery appears to be occluded with minimal flow distally  - Vascular Team Dr Rhoades on board: no acute surgical intervention  - follow up outpatient     Attending Attestation:  Pt seen and examined today. Treated for acute on chronic HFrEF during this admission s/p bumex drip. Pt is cleared by HF service for DC with outpatient follow-up. Will discharge on recommended oral diuretics. Pt with no acute complaints or concerns today.

## 2022-02-16 NOTE — DISCHARGE NOTE PROVIDER - CARE PROVIDER_API CALL
Joel Gaspar; MD)  Adv Heart Fail Trnsplnt Cardio; Cardiovascular Disease; Internal Medicine  [address] [place] [address]  Phone: [phone]  Fax: [phone]  Follow Up Time: 1 week    Pardeep Soto)  Cardiac Electrophysiology; Cardiology; Internal Medicine  [address] [place] [address]  Phone: [phone]  Fax: [phone]  Follow Up Time: 1 month    Jan Rhoades)  Surgery; Vascular Surgery  [address] [place] [address]  Phone: [phone]  Fax: [phone]  Follow Up Time: 2 weeks

## 2022-02-16 NOTE — DISCHARGE NOTE PROVIDER - PROVIDER TOKENS
PROVIDER:[TOKEN:[[id number]],FOLLOWUP:[1 week]],PROVIDER:[TOKEN:[[id number]],FOLLOWUP:[1 month]],PROVIDER:[TOKEN:[[id number]],FOLLOWUP:[2 weeks]]

## 2022-02-16 NOTE — DISCHARGE NOTE PROVIDER - NSDCFUADDAPPT_GEN_ALL_CORE_FT
APPTS ARE READY TO BE MADE: [ x] YES    Best Family or Patient Contact (if needed): [phone]    Additional Information about above appointments (if needed):    1: Dr. Joo PEDRO   2: Dr. Charles Sharp EP  3:     Other comments or requests:    APPTS ARE READY TO BE MADE: [ x] YES    Best Family or Patient Contact (if needed): [phone]    Additional Information about above appointments (if needed):    1: Dr. Joo PEDRO   2: Dr. Soto - EP  3: 3 attempts were made to reach patient, which have been unsuccessful. 3 Voicemails have been left (2/16/22, 2/17/22 and 2/18/22). Will await a call back from patient to coordinate follow up  care.	    Other comments or requests:

## 2022-02-16 NOTE — DISCHARGE NOTE NURSING/CASE MANAGEMENT/SOCIAL WORK - NSDCPEFALRISK_GEN_ALL_CORE
For information on Fall & Injury Prevention, visit: [email or web address] OR  [email or web address] OR  https://www.cdc.gov/steadi/patient.html

## 2022-02-16 NOTE — DISCHARGE NOTE PROVIDER - CARE PROVIDERS DIRECT ADDRESSES
,[email or web address]

## 2022-02-17 VITALS
SYSTOLIC BLOOD PRESSURE: 113 MMHG | RESPIRATION RATE: 18 BRPM | TEMPERATURE: 98 F | DIASTOLIC BLOOD PRESSURE: 59 MMHG | HEART RATE: 68 BPM

## 2022-02-17 NOTE — CHART NOTE - NSCHARTNOTEFT_GEN_A_CORE
Left a message for patient in regards to follow up care with call back information.
Patient was seen and examined with attending Dr. Rhoades on 2.11.21 around 8:30AM.  Multiple comorbidities: former smoker, COPD on 2L NC, non-ambulatory; presented SOB likely 2/2 CHF exacerbation  C/o sensation of cold feet b/l in ED prompting arterial duplex which showed b/l SFA occlusions w/ infra-popliteal reconstition  Pt denies foot pain and does not have any wounds and is non-ambulatory  Clinically feet are warm, with cap refill ~ 2 secs, with biphasic DP/PT bilaterally.    A:  CHF/COPD/PAD/former smoker/nonambulatory pw CHF exacerbation   B/L SFA occlusions; no wounds and preserved flow to feet bilaterally.    P:  No vascular surgical intervention indicated  Can follow as outpatient in 2-4 weeks following discharge  Continue ASA/Plavix, statin (prior home meds)  D/w Dr. Rhoades
Left a second message for patient in regards to follow up care with call back information.

## 2022-03-17 ENCOUNTER — APPOINTMENT (OUTPATIENT)
Dept: CARDIOLOGY | Facility: CLINIC | Age: 57
End: 2022-03-17

## 2022-03-21 ENCOUNTER — INPATIENT (INPATIENT)
Facility: HOSPITAL | Age: 57
LOS: 14 days | Discharge: HOME HEALTH | End: 2022-04-05
Attending: INTERNAL MEDICINE | Admitting: INTERNAL MEDICINE
Payer: MEDICARE

## 2022-03-21 VITALS
TEMPERATURE: 97 F | WEIGHT: 184.97 LBS | SYSTOLIC BLOOD PRESSURE: 139 MMHG | HEIGHT: 56 IN | RESPIRATION RATE: 20 BRPM | DIASTOLIC BLOOD PRESSURE: 74 MMHG | OXYGEN SATURATION: 97 % | HEART RATE: 104 BPM

## 2022-03-21 LAB
ALBUMIN SERPL ELPH-MCNC: 3.6 G/DL — SIGNIFICANT CHANGE UP (ref 3.5–5.2)
ALP SERPL-CCNC: 125 U/L — HIGH (ref 30–115)
ALT FLD-CCNC: 9 U/L — SIGNIFICANT CHANGE UP (ref 0–41)
ANION GAP SERPL CALC-SCNC: 13 MMOL/L — SIGNIFICANT CHANGE UP (ref 7–14)
AST SERPL-CCNC: 23 U/L — SIGNIFICANT CHANGE UP (ref 0–41)
BASOPHILS # BLD AUTO: 0.09 K/UL — SIGNIFICANT CHANGE UP (ref 0–0.2)
BASOPHILS NFR BLD AUTO: 1.6 % — HIGH (ref 0–1)
BILIRUB SERPL-MCNC: 1.8 MG/DL — HIGH (ref 0.2–1.2)
BUN SERPL-MCNC: 8 MG/DL — LOW (ref 10–20)
CALCIUM SERPL-MCNC: 8.9 MG/DL — SIGNIFICANT CHANGE UP (ref 8.5–10.1)
CHLORIDE SERPL-SCNC: 100 MMOL/L — SIGNIFICANT CHANGE UP (ref 98–110)
CO2 SERPL-SCNC: 28 MMOL/L — SIGNIFICANT CHANGE UP (ref 17–32)
CREAT SERPL-MCNC: 0.8 MG/DL — SIGNIFICANT CHANGE UP (ref 0.7–1.5)
EGFR: 87 ML/MIN/1.73M2 — SIGNIFICANT CHANGE UP
EOSINOPHIL # BLD AUTO: 0.1 K/UL — SIGNIFICANT CHANGE UP (ref 0–0.7)
EOSINOPHIL NFR BLD AUTO: 1.8 % — SIGNIFICANT CHANGE UP (ref 0–8)
GLUCOSE SERPL-MCNC: 90 MG/DL — SIGNIFICANT CHANGE UP (ref 70–99)
HCT VFR BLD CALC: 40.6 % — SIGNIFICANT CHANGE UP (ref 37–47)
HGB BLD-MCNC: 12.6 G/DL — SIGNIFICANT CHANGE UP (ref 12–16)
IMM GRANULOCYTES NFR BLD AUTO: 0.4 % — HIGH (ref 0.1–0.3)
LYMPHOCYTES # BLD AUTO: 1.15 K/UL — LOW (ref 1.2–3.4)
LYMPHOCYTES # BLD AUTO: 20.8 % — SIGNIFICANT CHANGE UP (ref 20.5–51.1)
MCHC RBC-ENTMCNC: 29.2 PG — SIGNIFICANT CHANGE UP (ref 27–31)
MCHC RBC-ENTMCNC: 31 G/DL — LOW (ref 32–37)
MCV RBC AUTO: 94 FL — SIGNIFICANT CHANGE UP (ref 81–99)
MONOCYTES # BLD AUTO: 0.42 K/UL — SIGNIFICANT CHANGE UP (ref 0.1–0.6)
MONOCYTES NFR BLD AUTO: 7.6 % — SIGNIFICANT CHANGE UP (ref 1.7–9.3)
NEUTROPHILS # BLD AUTO: 3.75 K/UL — SIGNIFICANT CHANGE UP (ref 1.4–6.5)
NEUTROPHILS NFR BLD AUTO: 67.8 % — SIGNIFICANT CHANGE UP (ref 42.2–75.2)
NRBC # BLD: 0 /100 WBCS — SIGNIFICANT CHANGE UP (ref 0–0)
NT-PROBNP SERPL-SCNC: 3452 PG/ML — HIGH (ref 0–300)
PLATELET # BLD AUTO: 226 K/UL — SIGNIFICANT CHANGE UP (ref 130–400)
POTASSIUM SERPL-MCNC: 3.9 MMOL/L — SIGNIFICANT CHANGE UP (ref 3.5–5)
POTASSIUM SERPL-SCNC: 3.9 MMOL/L — SIGNIFICANT CHANGE UP (ref 3.5–5)
PROT SERPL-MCNC: 6.7 G/DL — SIGNIFICANT CHANGE UP (ref 6–8)
RBC # BLD: 4.32 M/UL — SIGNIFICANT CHANGE UP (ref 4.2–5.4)
RBC # FLD: 18.2 % — HIGH (ref 11.5–14.5)
SARS-COV-2 RNA SPEC QL NAA+PROBE: SIGNIFICANT CHANGE UP
SODIUM SERPL-SCNC: 141 MMOL/L — SIGNIFICANT CHANGE UP (ref 135–146)
TROPONIN T SERPL-MCNC: <0.01 NG/ML — SIGNIFICANT CHANGE UP
WBC # BLD: 5.53 K/UL — SIGNIFICANT CHANGE UP (ref 4.8–10.8)
WBC # FLD AUTO: 5.53 K/UL — SIGNIFICANT CHANGE UP (ref 4.8–10.8)

## 2022-03-21 PROCEDURE — 93010 ELECTROCARDIOGRAM REPORT: CPT

## 2022-03-21 PROCEDURE — 76937 US GUIDE VASCULAR ACCESS: CPT | Mod: 26

## 2022-03-21 PROCEDURE — 99285 EMERGENCY DEPT VISIT HI MDM: CPT | Mod: 25

## 2022-03-21 PROCEDURE — 36000 PLACE NEEDLE IN VEIN: CPT

## 2022-03-21 PROCEDURE — 71045 X-RAY EXAM CHEST 1 VIEW: CPT | Mod: 26

## 2022-03-21 RX ORDER — FUROSEMIDE 40 MG
40 TABLET ORAL ONCE
Refills: 0 | Status: COMPLETED | OUTPATIENT
Start: 2022-03-21 | End: 2022-03-21

## 2022-03-21 NOTE — ED PROVIDER NOTE - ATTENDING CONTRIBUTION TO CARE
57-year-old female past medical history of CAD stent, CHF with an EF of 15 to 20%, no AICD, patient states she wears a LifeVest at home, CVA diabetes COPD on 5 L O2 nasal cannula chronically, hypertension hyperlipidemia, presents with 1 week of bilateral lower extremity edema.  Patient states she takes Lasix 30 mg twice daily and it is not making her urinate.  No fever cough.  No shortness of breath.  No chest pain.  Patient states she is looking for a new cardiologist.  Lives alone with 24/7 home health aide.    On exam, AFVSS, Well appearing, No acute distress, NCAT, EOMI, PERRLA, MMM, Neck supple, mild wheezing bilaterally, no respiratory distress, RRR nl s1s2 No mrg, Abdomen Soft NTND, AAOx3, No Focal Deficits, 2+ pitting bilateral lower extremity edema, soft compartments, no erythema or warmth, full range of motion, sensation intact, 2+ DP pulse    A/P; leg swelling, fluid overload–we will do labs EKG BNP troponin chest x-ray Lasix admit

## 2022-03-21 NOTE — ED PROVIDER NOTE - OBJECTIVE STATEMENT
58 yo female with a pmh of COPD (on 3L home O2), CORNELIUS on CPAP, HFrEF (15-20% on TTE 11/2021), severe pulmonary HTN, CAD s/p PCI to RCA, HTN/HLD, and CVA presents c/o edema. pt states to have noted edema to bilateral feet that has been gradually worsening. pt denies any other symptoms including fevers, chill, headache, recent illness/travel, cough, abdominal pain, chest pain, or SOB.

## 2022-03-21 NOTE — ED PROVIDER NOTE - PHYSICAL EXAMINATION
Gen: NAD, AOx3  Head: NCAT  HEENT: PERRL, oral mucosa moist, normal conjunctiva, oropharynx clear without exudate or erythema  Lung: no respiratory distress, diffuse crackles   CV: normal s1/s2, rrr, Normal perfusion, pulses 2+ throughout  Abd: soft, NTND, no CVA tenderness  Genitourinary: no pelvic tenderness  MSK: BLE edema, no visible deformities, full range of motion in all 4 extremities  Neuro: CN II-XII grossly intact, No focal neurologic deficits  Skin: No rash   Psych: normal affect

## 2022-03-21 NOTE — ED PROVIDER NOTE - NS ED ATTENDING STATEMENT MOD
This was a shared visit with the OTONIEL. I reviewed and verified the documentation and independently performed the documented:

## 2022-03-21 NOTE — ED ADULT NURSE NOTE - OBJECTIVE STATEMENT
pt came to the ED c/o b/l leg swelling. redness and swelling noted to b/l leg and feet. +4 edema noted. pt denies SOB. pt on 5l of home O2. denies smoking. quit 2 months ago. denies fever/chills. pt states she takes diuretics as prescribed.

## 2022-03-21 NOTE — ED PROCEDURE NOTE - US POC STATEMENT
The patient/family was/were informed of limited nature of the exam. Representative images were printed to be scanned into the chart or directly uploaded into the medical record. Statement Selected

## 2022-03-21 NOTE — ED PROVIDER NOTE - NS_EDPROVIDERDISPOUSERTYPE_ED_A_ED
[facility] Approved    Filling Pharmacy: [facility]  Additional Information: Pharmacy contacted - received paid claim.  Pharmacy will contact patient when medication is ready to be picked up.           
[employer] Pending    Insurance response  Prescription Drug Insurance: [employer]  Notes: Prior authorization submitted - will update provider when decision has been made by insurance.           
Attending Attestation (For Attendings USE Only)...

## 2022-03-21 NOTE — ED ADULT NURSE NOTE - NSNUTRITIONRISK_GI_A_CORE
Patient due for Pap and HPV.    Reminder done today via telephone call - spoke with pt.     BMI less than 18

## 2022-03-22 LAB
ALBUMIN SERPL ELPH-MCNC: 3.5 G/DL — SIGNIFICANT CHANGE UP (ref 3.5–5.2)
ALP SERPL-CCNC: 122 U/L — HIGH (ref 30–115)
ALT FLD-CCNC: 8 U/L — SIGNIFICANT CHANGE UP (ref 0–41)
ANION GAP SERPL CALC-SCNC: 11 MMOL/L — SIGNIFICANT CHANGE UP (ref 7–14)
ANION GAP SERPL CALC-SCNC: 13 MMOL/L — SIGNIFICANT CHANGE UP (ref 7–14)
AST SERPL-CCNC: 15 U/L — SIGNIFICANT CHANGE UP (ref 0–41)
BASOPHILS # BLD AUTO: 0.1 K/UL — SIGNIFICANT CHANGE UP (ref 0–0.2)
BASOPHILS NFR BLD AUTO: 2.1 % — HIGH (ref 0–1)
BILIRUB SERPL-MCNC: 1.8 MG/DL — HIGH (ref 0.2–1.2)
BUN SERPL-MCNC: 7 MG/DL — LOW (ref 10–20)
BUN SERPL-MCNC: 9 MG/DL — LOW (ref 10–20)
CALCIUM SERPL-MCNC: 8.8 MG/DL — SIGNIFICANT CHANGE UP (ref 8.5–10.1)
CALCIUM SERPL-MCNC: 9 MG/DL — SIGNIFICANT CHANGE UP (ref 8.5–10.1)
CHLORIDE SERPL-SCNC: 100 MMOL/L — SIGNIFICANT CHANGE UP (ref 98–110)
CHLORIDE SERPL-SCNC: 103 MMOL/L — SIGNIFICANT CHANGE UP (ref 98–110)
CO2 SERPL-SCNC: 27 MMOL/L — SIGNIFICANT CHANGE UP (ref 17–32)
CO2 SERPL-SCNC: 27 MMOL/L — SIGNIFICANT CHANGE UP (ref 17–32)
CREAT SERPL-MCNC: 0.7 MG/DL — SIGNIFICANT CHANGE UP (ref 0.7–1.5)
CREAT SERPL-MCNC: 0.9 MG/DL — SIGNIFICANT CHANGE UP (ref 0.7–1.5)
EGFR: 102 ML/MIN/1.73M2 — SIGNIFICANT CHANGE UP
EGFR: 76 ML/MIN/1.73M2 — SIGNIFICANT CHANGE UP
EOSINOPHIL # BLD AUTO: 0.1 K/UL — SIGNIFICANT CHANGE UP (ref 0–0.7)
EOSINOPHIL NFR BLD AUTO: 2.1 % — SIGNIFICANT CHANGE UP (ref 0–8)
GLUCOSE BLDC GLUCOMTR-MCNC: 124 MG/DL — HIGH (ref 70–99)
GLUCOSE BLDC GLUCOMTR-MCNC: 138 MG/DL — HIGH (ref 70–99)
GLUCOSE BLDC GLUCOMTR-MCNC: 140 MG/DL — HIGH (ref 70–99)
GLUCOSE BLDC GLUCOMTR-MCNC: 95 MG/DL — SIGNIFICANT CHANGE UP (ref 70–99)
GLUCOSE SERPL-MCNC: 113 MG/DL — HIGH (ref 70–99)
GLUCOSE SERPL-MCNC: 90 MG/DL — SIGNIFICANT CHANGE UP (ref 70–99)
HCT VFR BLD CALC: 41.3 % — SIGNIFICANT CHANGE UP (ref 37–47)
HGB BLD-MCNC: 12.7 G/DL — SIGNIFICANT CHANGE UP (ref 12–16)
IMM GRANULOCYTES NFR BLD AUTO: 0.2 % — SIGNIFICANT CHANGE UP (ref 0.1–0.3)
LYMPHOCYTES # BLD AUTO: 1.04 K/UL — LOW (ref 1.2–3.4)
LYMPHOCYTES # BLD AUTO: 21.9 % — SIGNIFICANT CHANGE UP (ref 20.5–51.1)
MAGNESIUM SERPL-MCNC: 1.5 MG/DL — LOW (ref 1.8–2.4)
MAGNESIUM SERPL-MCNC: 2.6 MG/DL — HIGH (ref 1.8–2.4)
MCHC RBC-ENTMCNC: 29.1 PG — SIGNIFICANT CHANGE UP (ref 27–31)
MCHC RBC-ENTMCNC: 30.8 G/DL — LOW (ref 32–37)
MCV RBC AUTO: 94.5 FL — SIGNIFICANT CHANGE UP (ref 81–99)
MONOCYTES # BLD AUTO: 0.37 K/UL — SIGNIFICANT CHANGE UP (ref 0.1–0.6)
MONOCYTES NFR BLD AUTO: 7.8 % — SIGNIFICANT CHANGE UP (ref 1.7–9.3)
NEUTROPHILS # BLD AUTO: 3.13 K/UL — SIGNIFICANT CHANGE UP (ref 1.4–6.5)
NEUTROPHILS NFR BLD AUTO: 65.9 % — SIGNIFICANT CHANGE UP (ref 42.2–75.2)
NRBC # BLD: 0 /100 WBCS — SIGNIFICANT CHANGE UP (ref 0–0)
PLATELET # BLD AUTO: 213 K/UL — SIGNIFICANT CHANGE UP (ref 130–400)
POTASSIUM SERPL-MCNC: 3.2 MMOL/L — LOW (ref 3.5–5)
POTASSIUM SERPL-MCNC: 4.4 MMOL/L — SIGNIFICANT CHANGE UP (ref 3.5–5)
POTASSIUM SERPL-SCNC: 3.2 MMOL/L — LOW (ref 3.5–5)
POTASSIUM SERPL-SCNC: 4.4 MMOL/L — SIGNIFICANT CHANGE UP (ref 3.5–5)
PROT SERPL-MCNC: 6.6 G/DL — SIGNIFICANT CHANGE UP (ref 6–8)
RBC # BLD: 4.37 M/UL — SIGNIFICANT CHANGE UP (ref 4.2–5.4)
RBC # FLD: 18.4 % — HIGH (ref 11.5–14.5)
SODIUM SERPL-SCNC: 140 MMOL/L — SIGNIFICANT CHANGE UP (ref 135–146)
SODIUM SERPL-SCNC: 141 MMOL/L — SIGNIFICANT CHANGE UP (ref 135–146)
TROPONIN T SERPL-MCNC: <0.01 NG/ML — SIGNIFICANT CHANGE UP
WBC # BLD: 4.75 K/UL — LOW (ref 4.8–10.8)
WBC # FLD AUTO: 4.75 K/UL — LOW (ref 4.8–10.8)

## 2022-03-22 PROCEDURE — 93010 ELECTROCARDIOGRAM REPORT: CPT

## 2022-03-22 PROCEDURE — 99223 1ST HOSP IP/OBS HIGH 75: CPT

## 2022-03-22 RX ORDER — ALBUTEROL 90 UG/1
2 AEROSOL, METERED ORAL EVERY 6 HOURS
Refills: 0 | Status: DISCONTINUED | OUTPATIENT
Start: 2022-03-22 | End: 2022-04-05

## 2022-03-22 RX ORDER — SPIRONOLACTONE 25 MG/1
50 TABLET, FILM COATED ORAL DAILY
Refills: 0 | Status: DISCONTINUED | OUTPATIENT
Start: 2022-03-22 | End: 2022-03-27

## 2022-03-22 RX ORDER — METOPROLOL TARTRATE 50 MG
50 TABLET ORAL DAILY
Refills: 0 | Status: DISCONTINUED | OUTPATIENT
Start: 2022-03-22 | End: 2022-04-05

## 2022-03-22 RX ORDER — ATORVASTATIN CALCIUM 80 MG/1
80 TABLET, FILM COATED ORAL AT BEDTIME
Refills: 0 | Status: DISCONTINUED | OUTPATIENT
Start: 2022-03-22 | End: 2022-04-05

## 2022-03-22 RX ORDER — BUDESONIDE AND FORMOTEROL FUMARATE DIHYDRATE 160; 4.5 UG/1; UG/1
2 AEROSOL RESPIRATORY (INHALATION)
Refills: 0 | Status: DISCONTINUED | OUTPATIENT
Start: 2022-03-22 | End: 2022-04-05

## 2022-03-22 RX ORDER — CLOPIDOGREL BISULFATE 75 MG/1
75 TABLET, FILM COATED ORAL DAILY
Refills: 0 | Status: DISCONTINUED | OUTPATIENT
Start: 2022-03-22 | End: 2022-04-05

## 2022-03-22 RX ORDER — POTASSIUM CHLORIDE 20 MEQ
40 PACKET (EA) ORAL
Refills: 0 | Status: COMPLETED | OUTPATIENT
Start: 2022-03-22 | End: 2022-03-22

## 2022-03-22 RX ORDER — BUMETANIDE 0.25 MG/ML
1 INJECTION INTRAMUSCULAR; INTRAVENOUS
Qty: 20 | Refills: 0 | Status: DISCONTINUED | OUTPATIENT
Start: 2022-03-22 | End: 2022-03-26

## 2022-03-22 RX ORDER — DEXTROSE 50 % IN WATER 50 %
25 SYRINGE (ML) INTRAVENOUS ONCE
Refills: 0 | Status: DISCONTINUED | OUTPATIENT
Start: 2022-03-22 | End: 2022-03-22

## 2022-03-22 RX ORDER — FUROSEMIDE 40 MG
60 TABLET ORAL EVERY 12 HOURS
Refills: 0 | Status: DISCONTINUED | OUTPATIENT
Start: 2022-03-22 | End: 2022-03-22

## 2022-03-22 RX ORDER — ACETAMINOPHEN 500 MG
650 TABLET ORAL EVERY 6 HOURS
Refills: 0 | Status: DISCONTINUED | OUTPATIENT
Start: 2022-03-22 | End: 2022-04-05

## 2022-03-22 RX ORDER — OMEGA-3 ACID ETHYL ESTERS 1 G
2 CAPSULE ORAL
Refills: 0 | Status: DISCONTINUED | OUTPATIENT
Start: 2022-03-22 | End: 2022-04-05

## 2022-03-22 RX ORDER — LANOLIN ALCOHOL/MO/W.PET/CERES
3 CREAM (GRAM) TOPICAL AT BEDTIME
Refills: 0 | Status: DISCONTINUED | OUTPATIENT
Start: 2022-03-22 | End: 2022-04-05

## 2022-03-22 RX ORDER — DEXTROSE 50 % IN WATER 50 %
12.5 SYRINGE (ML) INTRAVENOUS ONCE
Refills: 0 | Status: DISCONTINUED | OUTPATIENT
Start: 2022-03-22 | End: 2022-03-22

## 2022-03-22 RX ORDER — SPIRONOLACTONE 25 MG/1
25 TABLET, FILM COATED ORAL DAILY
Refills: 0 | Status: DISCONTINUED | OUTPATIENT
Start: 2022-03-22 | End: 2022-03-22

## 2022-03-22 RX ORDER — IPRATROPIUM/ALBUTEROL SULFATE 18-103MCG
3 AEROSOL WITH ADAPTER (GRAM) INHALATION EVERY 6 HOURS
Refills: 0 | Status: DISCONTINUED | OUTPATIENT
Start: 2022-03-22 | End: 2022-04-05

## 2022-03-22 RX ORDER — MAGNESIUM SULFATE 500 MG/ML
2 VIAL (ML) INJECTION
Refills: 0 | Status: COMPLETED | OUTPATIENT
Start: 2022-03-22 | End: 2022-03-22

## 2022-03-22 RX ORDER — INSULIN LISPRO 100/ML
VIAL (ML) SUBCUTANEOUS
Refills: 0 | Status: DISCONTINUED | OUTPATIENT
Start: 2022-03-22 | End: 2022-04-05

## 2022-03-22 RX ORDER — ASPIRIN/CALCIUM CARB/MAGNESIUM 324 MG
81 TABLET ORAL DAILY
Refills: 0 | Status: DISCONTINUED | OUTPATIENT
Start: 2022-03-22 | End: 2022-04-05

## 2022-03-22 RX ORDER — INFLUENZA VIRUS VACCINE 15; 15; 15; 15 UG/.5ML; UG/.5ML; UG/.5ML; UG/.5ML
0.5 SUSPENSION INTRAMUSCULAR ONCE
Refills: 0 | Status: COMPLETED | OUTPATIENT
Start: 2022-03-22 | End: 2022-03-22

## 2022-03-22 RX ORDER — SACUBITRIL AND VALSARTAN 24; 26 MG/1; MG/1
1 TABLET, FILM COATED ORAL
Refills: 0 | Status: DISCONTINUED | OUTPATIENT
Start: 2022-03-22 | End: 2022-03-26

## 2022-03-22 RX ORDER — SPIRONOLACTONE 25 MG/1
50 TABLET, FILM COATED ORAL DAILY
Refills: 0 | Status: DISCONTINUED | OUTPATIENT
Start: 2022-03-22 | End: 2022-03-22

## 2022-03-22 RX ORDER — INSULIN LISPRO 100/ML
VIAL (ML) SUBCUTANEOUS AT BEDTIME
Refills: 0 | Status: DISCONTINUED | OUTPATIENT
Start: 2022-03-22 | End: 2022-03-22

## 2022-03-22 RX ORDER — BUMETANIDE 0.25 MG/ML
2 INJECTION INTRAMUSCULAR; INTRAVENOUS ONCE
Refills: 0 | Status: COMPLETED | OUTPATIENT
Start: 2022-03-22 | End: 2022-03-22

## 2022-03-22 RX ORDER — PANTOPRAZOLE SODIUM 20 MG/1
40 TABLET, DELAYED RELEASE ORAL
Refills: 0 | Status: DISCONTINUED | OUTPATIENT
Start: 2022-03-22 | End: 2022-04-05

## 2022-03-22 RX ORDER — ENOXAPARIN SODIUM 100 MG/ML
40 INJECTION SUBCUTANEOUS EVERY 24 HOURS
Refills: 0 | Status: DISCONTINUED | OUTPATIENT
Start: 2022-03-22 | End: 2022-04-05

## 2022-03-22 RX ORDER — FENOFIBRATE,MICRONIZED 130 MG
145 CAPSULE ORAL DAILY
Refills: 0 | Status: DISCONTINUED | OUTPATIENT
Start: 2022-03-22 | End: 2022-04-05

## 2022-03-22 RX ORDER — QUETIAPINE FUMARATE 200 MG/1
25 TABLET, FILM COATED ORAL AT BEDTIME
Refills: 0 | Status: DISCONTINUED | OUTPATIENT
Start: 2022-03-22 | End: 2022-03-23

## 2022-03-22 RX ADMIN — Medication 25 GRAM(S): at 17:56

## 2022-03-22 RX ADMIN — Medication 40 MILLIEQUIVALENT(S): at 10:57

## 2022-03-22 RX ADMIN — Medication 145 MILLIGRAM(S): at 12:12

## 2022-03-22 RX ADMIN — BUMETANIDE 2 MILLIGRAM(S): 0.25 INJECTION INTRAMUSCULAR; INTRAVENOUS at 18:06

## 2022-03-22 RX ADMIN — ENOXAPARIN SODIUM 40 MILLIGRAM(S): 100 INJECTION SUBCUTANEOUS at 05:58

## 2022-03-22 RX ADMIN — Medication 25 GRAM(S): at 10:48

## 2022-03-22 RX ADMIN — Medication 2 GRAM(S): at 05:59

## 2022-03-22 RX ADMIN — Medication 40 MILLIEQUIVALENT(S): at 14:29

## 2022-03-22 RX ADMIN — Medication 2 GRAM(S): at 18:08

## 2022-03-22 RX ADMIN — QUETIAPINE FUMARATE 25 MILLIGRAM(S): 200 TABLET, FILM COATED ORAL at 22:32

## 2022-03-22 RX ADMIN — SPIRONOLACTONE 50 MILLIGRAM(S): 25 TABLET, FILM COATED ORAL at 05:59

## 2022-03-22 RX ADMIN — SACUBITRIL AND VALSARTAN 1 TABLET(S): 24; 26 TABLET, FILM COATED ORAL at 18:04

## 2022-03-22 RX ADMIN — ATORVASTATIN CALCIUM 80 MILLIGRAM(S): 80 TABLET, FILM COATED ORAL at 22:32

## 2022-03-22 RX ADMIN — CLOPIDOGREL BISULFATE 75 MILLIGRAM(S): 75 TABLET, FILM COATED ORAL at 12:11

## 2022-03-22 RX ADMIN — SACUBITRIL AND VALSARTAN 1 TABLET(S): 24; 26 TABLET, FILM COATED ORAL at 05:59

## 2022-03-22 RX ADMIN — Medication 81 MILLIGRAM(S): at 12:11

## 2022-03-22 RX ADMIN — Medication 40 MILLIGRAM(S): at 00:06

## 2022-03-22 RX ADMIN — PANTOPRAZOLE SODIUM 40 MILLIGRAM(S): 20 TABLET, DELAYED RELEASE ORAL at 06:01

## 2022-03-22 RX ADMIN — Medication 40 MILLIEQUIVALENT(S): at 12:19

## 2022-03-22 RX ADMIN — Medication 25 GRAM(S): at 12:12

## 2022-03-22 RX ADMIN — Medication 50 MILLIGRAM(S): at 05:58

## 2022-03-22 RX ADMIN — BUMETANIDE 5 MG/HR: 0.25 INJECTION INTRAMUSCULAR; INTRAVENOUS at 18:07

## 2022-03-22 RX ADMIN — Medication 60 MILLIGRAM(S): at 10:25

## 2022-03-22 NOTE — H&P ADULT - HISTORY OF PRESENT ILLNESS
56 yo female with a pmh of COPD (on 3L home O2), CORNELIUS on CPAP, HFrEF (15-20% on TTE 11/2021), severe pulmonary HTN, CAD s/p PCI to RCA, HTN/HLD, and CVA presents c/o edema. pt states to have noted edema to bilateral feet that has been gradually worsening. pt denies any other symptoms including fevers, chill, headache, recent illness/travel, cough, abdominal pain, chest pain, or SOB 57 year old female with PMH of COPD on 3L home O2, CORNELIUS on CPAP, HFrEF (19% on TTE 02/2022, life vest at home), pulmonary HTN, CAD s/p PCI to RCA, HTN, HLD, CVA with residual LLE weakness, DM, recently quit smoking (2 months ago) presented to the ED for worsening bilateral LE swelling. Patient had noted that for the past week prior to admission, her legs began to have progressively worsening swelling and are now tender. The day of admission, patient was in the bath and complained to the HHA who called 911, so she arrived in the ED without her life vest. Patient was discharged from the hospital on 2/16 after being diuresed for acute on chronic HFrEF exacerbation. She states that she has been compliant with her diet and medications, denies any NSAID use. She was not able to follow up with any physicians since her last discharge. Otherwise denies any fevers, chills, chest pain, cough, SOB, or urinary symptoms.     In the ED, HR was 104, vitals otherwise stable. Lab work was remarkable for pro-BNP 3452. Given Lasix 40mg IV in the ED and admitted to medicine for further work up and management.

## 2022-03-22 NOTE — ED ADULT NURSE REASSESSMENT NOTE - NS ED NURSE REASSESS COMMENT FT1
p , /79. Reported to MD Adair [phone], as per MD give all blood pressure medications as ordered.
pt uses life vest at home but did not put it on today. pads placed on patient connected to zoll. pt also placed on cardiac monitoring.
Patient presents A/O x3 denies pain presently, denies SOB. 3L NC in place. Deep breathing and coughing encouraged. Repositioned with 1 person assist. B/L crackles on auscultation. Comfort measures provided. Updated patient on plan of care. Will continue to monitor

## 2022-03-22 NOTE — PATIENT PROFILE ADULT - DEAF OR HARD OF HEARING?
Xerosis Aggressive Treatment: I recommended application of Cetaphil or CeraVe numerous times a day and before going to bed to all dry areas. I also prescribed a topical steroid for twice daily use. no

## 2022-03-22 NOTE — H&P ADULT - ASSESSMENT
57 year old female with PMH of COPD on 3L home O2, CORNELIUS on CPAP, HFrEF (19% on TTE 02/2022, life vest at home), pulmonary HTN, CAD s/p PCI to RCA, HTN, HLD, CVA with residual LLE weakness, DM, recently quit smoking (2 months ago) presented to the ED for worsening bilateral LE swelling.     # bilateral LE edema likely secondary to acute on chronic HFrEF exacerbation  # H/O pulmonary HTN   # H/O PAD with bilateral SFA occlusion  - Echo 02/06/2022: 19% EF, small pericardial effusion, moderate p-HTN, mod-severe MR  - Daily weight. Strict I & Os. Keep I < O. Fluid restriction 1L  - pro-BNP on admission 3425, around baseline.   - Troponin negative on admission, repeat in AM.  - Continue Metoprolol 50mg PO daily, spironolactone 25mg PO daily, entresto 97-103mg PO BID   - continue with aspirin, plavix, and statin.   - repeat TTE  - repeat bilateral LE arterial and venous duplex of LE. Patient may need vascular surgery follow up.   - start Lasix 60 mg IV BID for now. Monitor electrolytes, keep K>4 and Mg >2.   - patient left life vest at home, will place on telemetry for now.   - reconsult EP consult for AICD   - consult with HF     # COPD on 3L home O2 - doubt active exacerbation  # CORNELIUS on CPAP   - now on 5L NC?, wean O2 as tolerated. Keep SpO2 88-92%.   - continue with symbicort.   - duonebs PRN   - Incentive spirometry.   - continue with CPAP at night     # H/O CAD s/p PCI to RCA  # H/O CVA with residual L sided weakness   # HTN/ HLD   - continue with aspirin 81mg PO daily, Plavix 75mg PO daily, Metoprolol succinate 50mg PO daily, and fenofibrate 145mg PO daily.     # DM  - monitor FS, if consistently >180, start insulin protocol  # GERD - continue with protonix     # DVT Prophylaxis: Lovenox  # Diet: DASH/TLC, CC, fluid restriction  # GI Prophylaxis: pantoprazole   # Activity: AAT  # Code Status: Full Code  # Dispo   57 year old female with PMH of COPD on 3L home O2, CORNELIUS on CPAP, HFrEF (19% on TTE 02/2022, life vest at home), pulmonary HTN, CAD s/p PCI to RCA, HTN, HLD, CVA with residual LLE weakness, DM, recently quit smoking (2 months ago) presented to the ED for worsening bilateral LE swelling.     # bilateral LE edema likely secondary to acute on chronic HFrEF exacerbation  # H/O pulmonary HTN   # H/O PAD with bilateral SFA occlusion  - Echo 02/06/2022: 19% EF, small pericardial effusion, moderate p-HTN, mod-severe MR  - Daily weight. Strict I & Os. Keep I < O. Fluid restriction 1L  - pro-BNP on admission 3425, around baseline.   - CXR performed on admission appears to show cardiomegaly with increased vascular congestion, Follow up official read   - Troponin negative on admission, repeat in AM.  - Follow up AM EKG   - Continue metoprolol 50mg PO daily, spironolactone 50mg PO daily, entresto 97-103mg PO BID   - continue with aspirin 81mg PO daily, plavix 75mg PO daily, and atorvastatin 80mg PO daily.   - repeat TTE  - repeat bilateral LE arterial and venous duplex of LE. Patient may need vascular surgery follow up.   - start Lasix 60 mg IV BID for now. Monitor electrolytes, keep K>4 and Mg >2.   - patient left life vest at home, will place on telemetry for now.   - reconsult EP consult for AICD   - consult with HF     # COPD on 3L home O2 - doubt active exacerbation  # CORNELIUS on CPAP   - now on 5L NC?, wean O2 as tolerated. Keep SpO2 88-92%.   - continue with symbicort.   - duonebs PRN   - Incentive spirometry.   - continue with CPAP at night     # H/O CAD s/p PCI to RCA  # H/O CVA with residual L sided weakness   # HTN/ HLD   - continue with aspirin 81mg PO daily, Plavix 75mg PO daily, Metoprolol succinate 50mg PO daily, and fenofibrate 145mg PO daily.     # DM  - monitor FS, if consistently >180, start insulin protocol  # GERD - continue with protonix     # DVT Prophylaxis: Lovenox  # Diet: DASH/TLC, CC, fluid restriction  # GI Prophylaxis: pantoprazole   # Activity: IAT  # Code Status: Full Code  # Dispo: acute

## 2022-03-22 NOTE — H&P ADULT - WHAT MATTERS MOST
Message sent to Dr Serra team. Can you please facilitate schedule LHC with Dr Serra. Thank you  Continued full medical management

## 2022-03-22 NOTE — PATIENT PROFILE ADULT - FALL HARM RISK - HARM RISK INTERVENTIONS

## 2022-03-22 NOTE — CONSULT NOTE ADULT - ASSESSMENT
Assessment: 57 year old female with PMH of COPD on 3L home O2, CORNELIUS on CPAP, HFrEF (19% on TTE 02/2022, life vest at home), pulmonary HTN, CAD s/p PCI to RCA, HTN, HLD, CVA with residual LLE weakness, DM who presented to the ED for worsening bilateral LE swelling. Patient stopped wearing lifevest several weeks ago    Impression:  HFrEF 19%  HF Exacerbation  CAD sp PCI to RCA  HTN  HLD  COPD  CORNELIUS on CPAP    Plan:  - Discussed the option of AICD implant with patient, she would like to discuss with her family before agreeing  - Cont tele monitoring while in hospital  - Repeat 2D Echo  - Continue GDMT  - If patient agrees to AICD, will need to be euvolemic prior to procedure  - Monitor electrolytes, maintain WNL  - Will follow

## 2022-03-22 NOTE — H&P ADULT - NSHPPHYSICALEXAM_GEN_ALL_CORE
PHYSICAL EXAM:  GENERAL: NAD, lying in bed comfortably  HEAD:  Atraumatic, Normocephalic  EYES: EOMI, conjunctiva and sclera clear  ENT: Moist mucous membranes +NC  NECK: Supple, No JVD  CHEST/LUNG: audible bilateral breath sounds   HEART: Regular rate and rhythm   ABDOMEN: Bowel sounds present; Soft, Nontender, Nondistended   EXTREMITIES:  3+ bilateral LE edema to the knee. R worse than L.   NERVOUS SYSTEM:  Alert & Oriented X3, speech clear.   MSK: L sided weakness residual post CVA

## 2022-03-22 NOTE — CONSULT NOTE ADULT - SUBJECTIVE AND OBJECTIVE BOX
Date of Admission: 3/21/22    Chief Complaint: Patient is a 57y old  Female who presents with a chief complaint of B/L edema    HISTORY OF PRESENT ILLNESS: 57 year old female with PMH of COPD on 3L home O2, CORNELIUS on CPAP, HFrEF (19% on TTE 2022, life vest at home), pulmonary HTN, CAD s/p PCI to RCA, HTN, HLD, CVA with residual LLE weakness, DM, recently quit smoking (2 months ago) presented to the ED for worsening bilateral LE swelling. Patient had noted that for the past week prior to admission, her legs began to have progressively worsening swelling and are now tender. The day of admission, patient was in the bath and complained to the HHA who called 911, so she arrived in the ED without her life vest. Patient was discharged from the hospital on  after being diuresed for acute on chronic HFrEF exacerbation. She states that she has been compliant with her diet and medications, denies any NSAID use. She was not able to follow up with any physicians since her last discharge. Otherwise denies any fevers, chills, chest pain, cough, SOB, or urinary symptoms.   In the ED, HR was 104, vitals otherwise stable. Lab work was remarkable for pro-BNP 3452. Given Lasix 40mg IV in the ED and admitted to medicine for further work up and management.  (22 Mar 2022 00:01)      Patient well known to heart failure team from multiple previous admissions. Patient reports progressively worsening LE edema x1 week. Reports her breathing is baseline (mild SOB 2/2 COPD). Endorses home medication compliance as well as low Na diet (denies any takeout food since last admission- states her HHA cooks her meals and does not utilize extra salt). Denies chest pain, palpitations, n/v, abdominal bloating, decrease in appetite, or abnormal bleeding.         PAST MEDICAL & SURGICAL HISTORY:  Hypertension  Hyperlipidemia  Anxiety and depression  COPD, severe  CHF (congestive heart failure)  Cerebrovascular accident (CVA)Multiple  Type 2 diabetes mellitus  CKD (chronic kidney disease), stage II  No significant past surgical history        FAMILY HISTORY:  No pertinent family history of premature cardiovascular disease in first degree relatives.  Mother:  of cancer at 65  Father:  of an overdose at 50    SOCIAL HISTORY: Former smoker- states she quit since her last admission a month ago. Denies alcohol or drug use.       Allergies  No Known Allergies    Intolerances    	    REVIEW OF SYSTEMS:  CONSTITUTIONAL: No fever, weight loss, or fatigue  CARDIOLOGY: denies chest pain, + mild shortness of breath (chronic), no syncopal episodes.   RESPIRATORY: + mild shortness of breath, + wheezing.   NEUROLOGICAL: no weakness, no focal deficits to report.  ENDOCRINOLOGICAL: no recent change in diabetic medications.   GI: no BRBPR, no N,V, diarrhea.    PSYCHIATRY: normal mood and affect  HEENT: no nasal discharge, no ecchymosis  SKIN: no ecchymosis, no breakdown  MUSCULOSKELETAL: Full range of motion x2, B/L LE weak- WC bound    PHYSICAL EXAM:  General Appearance: well appearing, normal for age and gender. 	  Neck: unable to assess due to body habitus  Eyes: Extra Ocular muscles intact.   Cardiovascular: regular rate and rhythm S1 S2, , No murmurs,+3 B/L edema  Respiratory: B/L anterior expiratory wheezing, posterior lung fields clear  Psychiatry: Alert and oriented x 3, Mood & affect appropriate  Gastrointestinal:  Soft, Non-tender  Skin/Integumentary : No rashes, No ecchymoses, No cyanosis	  Neurologic: Non-focal  Musculoskeletal/ extremities: Normal range of motion, No clubbing, cyanosis   Vascular: Peripheral pulses palpable 2+ bilaterally    CARDIAC MARKERS:  Serum Pro-Brain Natriuretic Peptide: 4892 pg/mL (22 @ 10:50)      TELEMETRY EVENTS: 	    ECG:  	3/21/22      Ventricular Rate 103 BPM  Atrial Rate 103 BPM  P-R Interval 172 ms  QRS Duration 152 ms  Q-T Interval 390 ms  QTC Calculation(Bazett) 510 ms  P Axis 91 degrees  R Axis 196 degrees  T Axis 33 degrees    Diagnosis Line *** Suspect armlead reversal, interpretation assumes no reversal  Sinus tachycardia  Possible Left atrial enlargement  Non-specific intra-ventricular conduction block  Lateral infarct , age undetermined  Abnormal ECG    Confirmed by CARLOTA GAUTHIER MD ([id number]) on 3/22/2022 9:01:55 AM          PREVIOUS DIAGNOSTIC TESTING:      TTE 22    Summary:   1. LV Ejection Fraction by Urena's Method with a biplane EF of 19 %.   2. Severely decreased global left ventricular systolic function.   3. Dilated RV with moderately reduced systolic function.   4. Moderately enlarged left atrium.   5. Severely enlarged right atrium.   6. Sclerotic aorticvalve with normal opening.   7. Mild aortic regurgitation.   8. The mitral valve leaflets are tethered due to reduced systolic   function and elevated LVDP.   9. Moderate-to-severe mitral regurgitation.  10. Severe tricuspid regurgitation.  11. Estimated pulmonary artery systolic pressure is 53.7 mmHg assuming a   right atrial pressure of 15 mmHg, which is consistent with moderate   pulmonary hypertension.  12. Small pericardial effusion adjacent to the right atrium.        TTE 21    Summary:   1. Left ventricular ejection fraction, by visual estimation, is 35 to 40%.   2.Moderately decreased global left ventricular systolic function.   3. Multiple left ventricular regional wall motion abnormalities exist. See wall motion findings.   4. Elevated left atrial and left ventricular end-diastolic pressures.   5. Mild concentric left ventricular hypertrophy.   6. Mildly increased LV wall thickness.   7. Normal left ventricular internal cavity size.   8. Spectral Doppler shows restrictive pattern of left ventricular myocardial filling (Grade III diastolic dysfunction).  9. Moderately reduced RV systolic function.  10. Mildly enlarged left atrium.  11. Moderately enlarged right atrium.  12. Small pericardial effusion.  13. Mild to moderate mitral valve regurgitation.  14. Moderate-severe tricuspid regurgitation.  15.Mild aortic regurgitation.  16. Sclerotic aortic valve with normal opening.  17. Estimated pulmonary artery systolic pressure is 50.0 mmHg assuming a right atrial pressure of 15 mmHg, which is consistent with moderate pulmonary hypertension.  18. Pulmonary hypertension is present.  19. LA volume Index is 36.7 ml/m² ml/m2.    	    Home Medications:  Albuterol (Eqv-ProAir HFA) 90 mcg/inh inhalation aerosol: 2 puff(s) inhaled every 6 hours, As Needed (2021 11:22)  aspirin 81 mg oral tablet: 1 tab(s) orally once a day (2021 11:22)  fenofibrate 145 mg oral tablet: 1 tab(s) orally once a day (2021 11:22)  glipiZIDE 10 mg oral tablet: 1 tab(s) orally 2 times a day (2021 11:22)  Lovaza 1000 mg oral capsule: 2 cap(s) orally 2 times a day (2021 14:52)  metFORMIN 1000 mg oral tablet: 1 tab(s) orally 2 times a day Do not take until  (2021 12:58)  Plavix 75 mg oral tablet: 1 tab(s) orally once a day (2021 11:22)  Vitamin D2 1.25 mg (50,000 intl units) oral capsule: 1 cap(s) orally once a week (2021 14:52)    MEDICATIONS  (STANDING):  aspirin  chewable 81 milliGRAM(s) Oral daily  atorvastatin 80 milliGRAM(s) Oral at bedtime  budesonide 160 MICROgram(s)/formoterol 4.5 MICROgram(s) Inhaler 2 Puff(s) Inhalation two times a day  chlorhexidine 4% Liquid 1 Application(s) Topical <User Schedule>  clopidogrel Tablet 75 milliGRAM(s) Oral daily  enoxaparin Injectable 40 milliGRAM(s) SubCutaneous daily  fenofibrate Tablet 145 milliGRAM(s) Oral daily  furosemide   Injectable 40 milliGRAM(s) IV Push two times a day  magnesium sulfate  IVPB 2 Gram(s) IV Intermittent once  metoprolol succinate ER 50 milliGRAM(s) Oral daily  omega-3-Acid Ethyl Esters 2 Gram(s) Oral two times a day  pantoprazole    Tablet 40 milliGRAM(s) Oral before breakfast  sacubitril 49 mG/valsartan 51 mG 1 Tablet(s) Oral two times a day    MEDICATIONS  (PRN):  ALBUTerol    90 MICROgram(s) HFA Inhaler 2 Puff(s) Inhalation every 6 hours PRN Shortness of Breath and/or Wheezing        
Patient is a 57y old  Female who presents with a chief complaint of CHF (22 Mar 2022 11:53)    HPI: Patient is a 57 year old female with PMH of COPD on 3L home O2, CORNELIUS on CPAP, HFrEF (19% on TTE 02/2022, life vest at home), pulmonary HTN, CAD s/p PCI to RCA, HTN, HLD, CVA with residual LLE weakness, DM, recently quit smoking (2 months ago) presented to the ED for worsening bilateral LE swelling. Patient had noted that for the past week prior to admission, her legs began to have progressively worsening swelling and are now tender. The day of admission, patient was in the bath and complained to the HHA who called 911, so she arrived in the ED without her life vest. Patient was discharged from the hospital on 2/16 after being diuresed for acute on chronic HFrEF exacerbation. She states that she has been compliant with her diet and medications, denies any NSAID use. She was not able to follow up with any physicians since her last discharge. Otherwise denies any fevers, chills, chest pain, cough, SOB, or urinary symptoms. Patient admits she stopped wearing her lifevest several weeks ago but reports she has been compliant with all of her medications. Patient was supposed to follow up with Dr Soto to discuss ICD but missed the appointment due to "headache". Denies any dizziness or syncope.    PAST MEDICAL & SURGICAL HISTORY:  Hypertension    Hyperlipidemia    Anxiety and depression    COPD, severe    CHF (congestive heart failure)    Cerebrovascular accident (CVA)  Multiple    Type 2 diabetes mellitus    CKD (chronic kidney disease), stage II    No significant past surgical history      PREVIOUS DIAGNOSTIC TESTING:      ECHO  FINDINGS:  < from: TTE Echo Complete w/o Contrast w/ Doppler (02.06.22 @ 17:21) >  Summary:   1. LV Ejection Fraction by Urena's Method with a biplane EF of 19 %.   2. Severely decreased global left ventricular systolic function.   3. Dilated RV with moderately reduced systolic function.   4. Moderately enlarged left atrium.   5. Severely enlarged right atrium.   6. Sclerotic aorticvalve with normal opening.   7. Mild aortic regurgitation.   8. The mitral valve leaflets are tethered due to reduced systolic   function and elevated LVDP.   9. Moderate-to-severe mitral regurgitation.  10. Severe tricuspid regurgitation.  11. Estimated pulmonary artery systolic pressure is 53.7 mmHg assuming a   right atrial pressure of 15 mmHg, which is consistent with moderate   pulmonary hypertension.  12. Small pericardial effusion adjacent to the right atrium.    < end of copied text >    STRESS  FINDINGS:  < from: NM Nuclear Stress Pharmacologic Multiple (06.16.21 @ 12:58) >  Impression:  1. MODERATE REVERSIBLE DEFECT IN THE SEPTUM, ANTERIOR AND INFERIOR WALL OF THE LEFT VENTRICLE CONSISTENT WITH ISCHEMIA.  2. DILATED LEFT VENTRICLE WITH MARKED GLOBAL HYPOKINESIS. THERE IS INCOMPLETE THICKENING OF THE SEPTUM ANTERIOR AND INFERIOR WALL OF THE LEFT VENTRICLE RAISING QUESTION OF HIBERNATING MYOCARDIUM. THE LATERAL WALL OF THE LEFT VENTRICLE THICKENS NORMALLY.  3. LEFT VENTRICULAR EJECTION FRACTION OF <20 % WHICH IS VERY LOW. ECHOCARDIOGRAPHIC ESTIMATED EJECTION FRACTION 6/14/2021 WAS 35 TO 40%.    < end of copied text >    CATHETERIZATION  FINDINGS:  < from: Cardiac Cath Lab - Adult (06.21.21 @ 11:51) >  CORONARY CIRCULATION: The coronary circulation is right dominant. There was    severe 1-vessel coronary artery disease (RCA). Left main: Normal. LAD:    Angiography showed mild atherosclerosis with no flow limiting lesions. 1st    diagonal: Angiography showed mild atherosclerosis with no flow limiting    lesions. Circumflex: Angiography showed minor luminal irregularities with    no flow limiting lesions. 1st obtuse marginal: Normal. Proximal RCA:    Angiography showed mild atherosclerosisMid RCA: There was a diffuse 90 %    stenosis at a site with no prior intervention. This is a likely culprit    for the patient's clinical presentation. An intervention was performed.    Distal RCA: Angiography showed minor luminal irregularities with no flow    limiting lesions. Right PDA: Normal.         COMPLICATIONS: No complications occurred during the cath lab visit.         IMPRESSIONS: There is significant single vessel coronary artery disease.    < end of copied text >    ELECTROPHYSIOLOGY STUDY  FINDINGS:    CAROTID ULTRASOUND:  FINDINGS    VENOUS DUPLEX SCAN:  FINDINGS:    CHEST CT PULMONARY ANGIO with IV Contrast:  FINDINGS:    MEDICATIONS  (STANDING):  aspirin enteric coated 81 milliGRAM(s) Oral daily  atorvastatin 80 milliGRAM(s) Oral at bedtime  budesonide 160 MICROgram(s)/formoterol 4.5 MICROgram(s) Inhaler 2 Puff(s) Inhalation two times a day  clopidogrel Tablet 75 milliGRAM(s) Oral daily  enoxaparin Injectable 40 milliGRAM(s) SubCutaneous every 24 hours  fenofibrate Tablet 145 milliGRAM(s) Oral daily  furosemide   Injectable 60 milliGRAM(s) IV Push every 12 hours  insulin lispro (ADMELOG) corrective regimen sliding scale   SubCutaneous three times a day before meals  magnesium sulfate  IVPB 2 Gram(s) IV Intermittent every 2 hours  metoprolol succinate ER 50 milliGRAM(s) Oral daily  omega-3-Acid Ethyl Esters 2 Gram(s) Oral two times a day  pantoprazole    Tablet 40 milliGRAM(s) Oral before breakfast  potassium chloride   Powder 40 milliEquivalent(s) Oral every 2 hours  QUEtiapine 25 milliGRAM(s) Oral at bedtime  sacubitril 97 mG/valsartan 103 mG 1 Tablet(s) Oral two times a day  spironolactone 50 milliGRAM(s) Oral daily    MEDICATIONS  (PRN):  acetaminophen     Tablet .. 650 milliGRAM(s) Oral every 6 hours PRN Temp greater or equal to 38C (100.4F), Mild Pain (1 - 3)  ALBUTerol    90 MICROgram(s) HFA Inhaler 2 Puff(s) Inhalation every 6 hours PRN Shortness of Breath and/or Wheezing  albuterol/ipratropium for Nebulization 3 milliLiter(s) Nebulizer every 6 hours PRN Shortness of Breath and/or Wheezing  melatonin 3 milliGRAM(s) Oral at bedtime PRN Insomnia      FAMILY HISTORY:  FH: diabetes mellitus (Father, Mother)    SOCIAL HISTORY: Quit smoking 2 months ago, No ETOH or illicit drug use    Past Surgical History: None    Allergies:  No Known Allergies      REVIEW OF SYSTEMS:  CONSTITUTIONAL: No fever, weight loss, chills, shakes, or fatigue  RESPIRATORY: No cough, wheezing, hemoptysis, or shortness of breath  CARDIOVASCULAR: No chest pain, dyspnea, palpitations, dizziness, syncope, paroxysmal nocturnal dyspnea, orthopnea, or arm or leg swelling  GASTROINTESTINAL: No abdominal  or epigastric pain, nausea, vomiting, hematemesis, diarrhea, constipation, melena or bright red blood.  NEUROLOGICAL: No headaches, memory loss, slurred speech, limb weakness, loss of strength, numbness, or tremors  MUSCULOSKELETAL: +LE swelling      Vital Signs Last 24 Hrs  T(C): 36.9 (22 Mar 2022 08:47), Max: 36.9 (22 Mar 2022 08:47)  T(F): 98.4 (22 Mar 2022 08:47), Max: 98.4 (22 Mar 2022 08:47)  HR: 86 (22 Mar 2022 08:47) (86 - 104)  BP: 106/73 (22 Mar 2022 08:47) (106/73 - 139/74)  BP(mean): 90 (22 Mar 2022 05:53) (90 - 90)  RR: 18 (22 Mar 2022 08:47) (18 - 20)  SpO2: 100% (22 Mar 2022 08:47) (97% - 100%)    PHYSICAL EXAM:  GENERAL: In no apparent distress, well nourished, and hydrated.  EYES: EOMI, PERRLA, conjunctiva and sclera clear  ENMT: No tonsillar erythema, exudates, or enlargements; ist mucous membranes, Good dentition, No lesions  NECK: Supple   HEART: Regular rate and rhythm; No murmurs, rubs, or gallops.  PULMONARY: Clear to auscultation and perfusion.  No rales, wheezing, or rhonchi bilaterally.  ABDOMEN: Soft, Nontender, Nondistended; Bowel sounds present  EXTREMITIES:  2+ Peripheral Pulses, No clubbing, cyanosis, or edema  NEUROLOGICAL: Grossly nonfocal      INTERPRETATION OF TELEMETRY: NSR 78 bpm    ECG:  < from: 12 Lead ECG (03.22.22 @ 10:24) >    Ventricular Rate 78 BPM    Atrial Rate 78 BPM    P-R Interval 192 ms    QRS Duration 148 ms    Q-T Interval 466 ms    QTC Calculation(Bazett) 531 ms    P Axis 60 degrees    R Axis 197 degrees    T Axis 18 degrees    Diagnosis Line Normal sinus rhythm  Possible Left atrial enlargement  Non-specific intra-ventricular conduction block  Lateral infarct , age undetermined  Abnormal ECG    Confirmed by CARLOTA GAUTHIER MD ([id number]) on 3/22/2022 1:16:17 PM    < end of copied text >      I&O's Detail      LABS:                        12.7   4.75  )-----------( 213      ( 22 Mar 2022 06:30 )             41.3     03-22    141  |  103  |  7<L>  ----------------------------<  90  3.2<L>   |  27  |  0.7    Ca    9.0      22 Mar 2022 06:30  Mg     1.5     03-22    TPro  6.6  /  Alb  3.5  /  TBili  1.8<H>  /  DBili  x   /  AST  15  /  ALT  8   /  AlkPhos  122<H>  03-22    CARDIAC MARKERS ( 22 Mar 2022 06:30 )  x     / <0.01 ng/mL / x     / x     / x      CARDIAC MARKERS ( 21 Mar 2022 22:07 )  x     / <0.01 ng/mL / x     / x     / x              BNPSerum Pro-Brain Natriuretic Peptide: 3452 pg/mL (03-21 @ 22:07)    I&O's Detail    Daily Height in cm: 142.24 (21 Mar 2022 16:39)    Daily     RADIOLOGY & ADDITIONAL STUDIES:

## 2022-03-22 NOTE — CONSULT NOTE ADULT - ASSESSMENT
Acute on chronic systolic HF / Fluid overload / pulmonary HTN / COPD / DM2 / Anxiety     Patient is grossly fluid overloaded on exam- POCUS exam: IVC 3cm, non collapsing   Continue Entresto  mg BID   Continue metoprolol XL 50 mg daily   Continue Spironolactone    Get BMP twice daily   Maintain potassium >4.0, Mg >2.1  Strict intake and output  Daily weight   Plan discussed with primary team  Will continue to follow   Acute on chronic systolic HF / Fluid overload / pulmonary HTN / COPD / DM2 / Anxiety     Patient is grossly fluid overloaded on exam- POCUS exam: IVC 3cm, non collapsing   Stop Furosemide 60mg IVP  Give Bumex 2 mg IV push, then start a Bumex gtt at 1 mg/hr  Continue Entresto  mg BID   Continue metoprolol XL 50 mg daily   Continue Spironolactone 50mg daily   Get BMP twice daily   Maintain potassium >4.0, Mg >2.1  Strict intake and output  Daily weight   Plan discussed with primary team  Will continue to follow   Acute on chronic systolic HF / Fluid overload / pulmonary HTN / COPD / DM2 / Anxiety     Patient is grossly fluid overloaded on exam- POCUS exam: IVC 3cm, non collapsing   Stop Furosemide 60mg IVP  Give Bumex 2 mg IV push, then start a Bumex gtt at 1 mg/hr  Continue Entresto  mg BID   Continue metoprolol XL 50 mg daily   Continue Spironolactone 50mg daily   Get BMP twice daily   Maintain potassium >4.0, Mg >2.1  Strict intake and output  Daily weight   Plan discussed with primary team  Will continue to follow.

## 2022-03-22 NOTE — H&P ADULT - ATTENDING COMMENTS
57 year old female with PMH of COPD on 3L home O2, CORNELIUS on CPAP, HFrEF (19% on TTE 02/2022, life vest at home), pulmonary HTN, CAD s/p PCI to RCA, HTN, HLD, CVA with residual LLE weakness, DM, recently quit smoking (2 months ago) presented to the ED for worsening bilateral LE swelling.     #Acute on Chronic HFrEF  #Hx of pulmonary HTN  #Hx of CAD s/p PCI  #HTN/HLD  #Hx of CVA  BNP elevated  CXR (03/21): Mild vascular congestion  - Cont IV lasix 60 BID, monitor I/Os, restrict fluids  - Cont metoprolol 50mg qD  - Cont spironolactone 50mg qD  - Cont entresto  BID  - Cont DAPT and statins  - Continue metoprolol 50mg PO daily, spironolactone 50mg PO daily, entresto 97-103mg PO BID   - continue with aspirin 81mg PO daily, plavix 75mg PO daily, and atorvastatin 80mg PO daily  - f/u LE duplex  - HF Eval  - EP Eval    #DM2  - Cont ISS    # COPD on 3L home O2 - doubt active exacerbation  # CORNELIUS on CPAP   - continue with symbicort.   - duonebs PRN   - continue with CPAP at night    DVT PPX, Lovenox    #Progress Note Handoff  Pending (specify): Cont IV diuresis  Family discussion: d/w pt regarding tx for HF  Disposition: Home 57 year old female with PMH of COPD on 3L home O2, CORNELIUS on CPAP, HFrEF (19% on TTE 02/2022, life vest at home), pulmonary HTN, CAD s/p PCI to RCA, HTN, HLD, CVA with residual LLE weakness, DM, recently quit smoking (2 months ago) presented to the ED for worsening bilateral LE swelling.     #Acute on Chronic HFrEF  #Moderate/Severe Mitral regurgitation  #Hx of pulmonary HTN  #Hx of CAD s/p PCI  #HTN/HLD  #Hx of CVA  BNP elevated  CXR (03/21): Mild vascular congestion  Echo 02/2022: 19%EF, mod-severe MR, mod pHTN  - Cont IV lasix 60 BID, monitor I/Os, restrict fluids  - Cont metoprolol 50mg qD  - Cont spironolactone 50mg qD  - Cont entresto  BID  - Cont DAPT and statins  - Continue metoprolol 50mg PO daily, spironolactone 50mg PO daily, entresto 97-103mg PO BID   - continue with aspirin 81mg PO daily, plavix 75mg PO daily, and atorvastatin 80mg PO daily  - f/u LE duplex  - HF Eval  - EP Eval    #DM2  - Cont ISS    # COPD on 3L home O2 - doubt active exacerbation  # CORNELIUS on CPAP   - continue with symbicort.   - duonebs PRN   - continue with CPAP at night    DVT PPX, Lovenox    #Progress Note Handoff  Pending (specify): Cont IV diuresis  Family discussion: d/w pt regarding tx for HF  Disposition: Home

## 2022-03-22 NOTE — H&P ADULT - NSHPLABSRESULTS_GEN_ALL_CORE
LABS:  cret                        12.6   5.53  )-----------( 226      ( 21 Mar 2022 22:07 )             40.6     03-21    141  |  100  |  8<L>  ----------------------------<  90  3.9   |  28  |  0.8    Ca    8.9      21 Mar 2022 22:07    TPro  6.7  /  Alb  3.6  /  TBili  1.8<H>  /  DBili  x   /  AST  23  /  ALT  9   /  AlkPhos  125<H>  03-21

## 2022-03-22 NOTE — H&P ADULT - CONVERSATION DETAILS
Discussed with patient the current diagnosis and medical treatment plan. We discussed code status in detail and she stated that she would like to remain full code at this time. MASHA placed in chart.

## 2022-03-22 NOTE — CONSULT NOTE ADULT - NS ATTEND AMEND GEN_ALL_CORE FT
Cardiomyopathy  Recurrent AT    - GDMT as per HF  - Discussed option of ICD with patient. She will discuss with her family. If shes agreeable and when stable as per HF, we will consider ICD.  - Tele/CCU as per HF  - Recall when patient decides.

## 2022-03-23 LAB
ALBUMIN SERPL ELPH-MCNC: 3.4 G/DL — LOW (ref 3.5–5.2)
ALP SERPL-CCNC: 130 U/L — HIGH (ref 30–115)
ALT FLD-CCNC: 8 U/L — SIGNIFICANT CHANGE UP (ref 0–41)
ANION GAP SERPL CALC-SCNC: 11 MMOL/L — SIGNIFICANT CHANGE UP (ref 7–14)
AST SERPL-CCNC: 16 U/L — SIGNIFICANT CHANGE UP (ref 0–41)
BASOPHILS # BLD AUTO: 0.07 K/UL — SIGNIFICANT CHANGE UP (ref 0–0.2)
BASOPHILS NFR BLD AUTO: 1.6 % — HIGH (ref 0–1)
BILIRUB SERPL-MCNC: 1.4 MG/DL — HIGH (ref 0.2–1.2)
BUN SERPL-MCNC: 11 MG/DL — SIGNIFICANT CHANGE UP (ref 10–20)
CALCIUM SERPL-MCNC: 8.8 MG/DL — SIGNIFICANT CHANGE UP (ref 8.5–10.1)
CHLORIDE SERPL-SCNC: 100 MMOL/L — SIGNIFICANT CHANGE UP (ref 98–110)
CO2 SERPL-SCNC: 26 MMOL/L — SIGNIFICANT CHANGE UP (ref 17–32)
CREAT SERPL-MCNC: 0.9 MG/DL — SIGNIFICANT CHANGE UP (ref 0.7–1.5)
EGFR: 76 ML/MIN/1.73M2 — SIGNIFICANT CHANGE UP
EOSINOPHIL # BLD AUTO: 0.07 K/UL — SIGNIFICANT CHANGE UP (ref 0–0.7)
EOSINOPHIL NFR BLD AUTO: 1.6 % — SIGNIFICANT CHANGE UP (ref 0–8)
GLUCOSE BLDC GLUCOMTR-MCNC: 107 MG/DL — HIGH (ref 70–99)
GLUCOSE BLDC GLUCOMTR-MCNC: 112 MG/DL — HIGH (ref 70–99)
GLUCOSE BLDC GLUCOMTR-MCNC: 112 MG/DL — HIGH (ref 70–99)
GLUCOSE BLDC GLUCOMTR-MCNC: 127 MG/DL — HIGH (ref 70–99)
GLUCOSE SERPL-MCNC: 128 MG/DL — HIGH (ref 70–99)
HCT VFR BLD CALC: 40.2 % — SIGNIFICANT CHANGE UP (ref 37–47)
HGB BLD-MCNC: 12.8 G/DL — SIGNIFICANT CHANGE UP (ref 12–16)
IMM GRANULOCYTES NFR BLD AUTO: 0.4 % — HIGH (ref 0.1–0.3)
LYMPHOCYTES # BLD AUTO: 1.23 K/UL — SIGNIFICANT CHANGE UP (ref 1.2–3.4)
LYMPHOCYTES # BLD AUTO: 27.6 % — SIGNIFICANT CHANGE UP (ref 20.5–51.1)
MAGNESIUM SERPL-MCNC: 2.2 MG/DL — SIGNIFICANT CHANGE UP (ref 1.8–2.4)
MCHC RBC-ENTMCNC: 29.8 PG — SIGNIFICANT CHANGE UP (ref 27–31)
MCHC RBC-ENTMCNC: 31.8 G/DL — LOW (ref 32–37)
MCV RBC AUTO: 93.7 FL — SIGNIFICANT CHANGE UP (ref 81–99)
MONOCYTES # BLD AUTO: 0.45 K/UL — SIGNIFICANT CHANGE UP (ref 0.1–0.6)
MONOCYTES NFR BLD AUTO: 10.1 % — HIGH (ref 1.7–9.3)
NEUTROPHILS # BLD AUTO: 2.61 K/UL — SIGNIFICANT CHANGE UP (ref 1.4–6.5)
NEUTROPHILS NFR BLD AUTO: 58.7 % — SIGNIFICANT CHANGE UP (ref 42.2–75.2)
NRBC # BLD: 0 /100 WBCS — SIGNIFICANT CHANGE UP (ref 0–0)
PLATELET # BLD AUTO: 224 K/UL — SIGNIFICANT CHANGE UP (ref 130–400)
POTASSIUM SERPL-MCNC: 4.5 MMOL/L — SIGNIFICANT CHANGE UP (ref 3.5–5)
POTASSIUM SERPL-SCNC: 4.5 MMOL/L — SIGNIFICANT CHANGE UP (ref 3.5–5)
PROT SERPL-MCNC: 6.4 G/DL — SIGNIFICANT CHANGE UP (ref 6–8)
RBC # BLD: 4.29 M/UL — SIGNIFICANT CHANGE UP (ref 4.2–5.4)
RBC # FLD: 18.3 % — HIGH (ref 11.5–14.5)
SODIUM SERPL-SCNC: 137 MMOL/L — SIGNIFICANT CHANGE UP (ref 135–146)
WBC # BLD: 4.45 K/UL — LOW (ref 4.8–10.8)
WBC # FLD AUTO: 4.45 K/UL — LOW (ref 4.8–10.8)

## 2022-03-23 PROCEDURE — 99233 SBSQ HOSP IP/OBS HIGH 50: CPT

## 2022-03-23 PROCEDURE — 93306 TTE W/DOPPLER COMPLETE: CPT | Mod: 26

## 2022-03-23 PROCEDURE — 71045 X-RAY EXAM CHEST 1 VIEW: CPT | Mod: 26

## 2022-03-23 PROCEDURE — 93970 EXTREMITY STUDY: CPT | Mod: 26

## 2022-03-23 RX ORDER — QUETIAPINE FUMARATE 200 MG/1
100 TABLET, FILM COATED ORAL AT BEDTIME
Refills: 0 | Status: DISCONTINUED | OUTPATIENT
Start: 2022-03-23 | End: 2022-04-05

## 2022-03-23 RX ADMIN — ENOXAPARIN SODIUM 40 MILLIGRAM(S): 100 INJECTION SUBCUTANEOUS at 06:12

## 2022-03-23 RX ADMIN — SACUBITRIL AND VALSARTAN 1 TABLET(S): 24; 26 TABLET, FILM COATED ORAL at 17:05

## 2022-03-23 RX ADMIN — Medication 145 MILLIGRAM(S): at 12:36

## 2022-03-23 RX ADMIN — SACUBITRIL AND VALSARTAN 1 TABLET(S): 24; 26 TABLET, FILM COATED ORAL at 06:14

## 2022-03-23 RX ADMIN — QUETIAPINE FUMARATE 100 MILLIGRAM(S): 200 TABLET, FILM COATED ORAL at 22:30

## 2022-03-23 RX ADMIN — PANTOPRAZOLE SODIUM 40 MILLIGRAM(S): 20 TABLET, DELAYED RELEASE ORAL at 06:14

## 2022-03-23 RX ADMIN — Medication 81 MILLIGRAM(S): at 12:36

## 2022-03-23 RX ADMIN — CLOPIDOGREL BISULFATE 75 MILLIGRAM(S): 75 TABLET, FILM COATED ORAL at 12:36

## 2022-03-23 RX ADMIN — BUDESONIDE AND FORMOTEROL FUMARATE DIHYDRATE 2 PUFF(S): 160; 4.5 AEROSOL RESPIRATORY (INHALATION) at 20:03

## 2022-03-23 RX ADMIN — SPIRONOLACTONE 50 MILLIGRAM(S): 25 TABLET, FILM COATED ORAL at 06:13

## 2022-03-23 RX ADMIN — BUDESONIDE AND FORMOTEROL FUMARATE DIHYDRATE 2 PUFF(S): 160; 4.5 AEROSOL RESPIRATORY (INHALATION) at 14:10

## 2022-03-23 RX ADMIN — Medication 50 MILLIGRAM(S): at 06:13

## 2022-03-23 RX ADMIN — Medication 2 GRAM(S): at 06:16

## 2022-03-23 RX ADMIN — ATORVASTATIN CALCIUM 80 MILLIGRAM(S): 80 TABLET, FILM COATED ORAL at 22:30

## 2022-03-23 RX ADMIN — BUMETANIDE 5 MG/HR: 0.25 INJECTION INTRAMUSCULAR; INTRAVENOUS at 06:13

## 2022-03-23 RX ADMIN — Medication 2 GRAM(S): at 17:05

## 2022-03-23 RX ADMIN — Medication 0: at 12:37

## 2022-03-23 NOTE — PROGRESS NOTE ADULT - SUBJECTIVE AND OBJECTIVE BOX
WILLIAN MARY 57y Female  MRN#: [id number]   CODE STATUS:________      SUBJECTIVE    No acute events overnight. Patient was afebrile and HD stable.                                              OBJECTIVE  PAST MEDICAL & SURGICAL HISTORY  Hypertension    Hyperlipidemia    Anxiety and depression    COPD, severe    CHF (congestive heart failure)    Cerebrovascular accident (CVA)  Multiple    Type 2 diabetes mellitus    CKD (chronic kidney disease), stage II    No significant past surgical history                                              -----------------------------------------------------------  ALLERGIES:  No Known Allergies                                            ------------------------------------------------------------    HOME MEDICATIONS  Home Medications:  atorvastatin 80 mg oral tablet: 1 tab(s) orally once a day (at bedtime) (16 Feb 2022 13:50)  fenofibrate 145 mg oral tablet: 1 tab(s) orally once a day (16 Feb 2022 13:50)  magnesium oxide 400 mg oral tablet: 1 tab(s) orally 3 times a day (with meals) (14 Jan 2022 12:37)  pantoprazole 40 mg oral delayed release tablet: 1 tab(s) orally once a day (before a meal) (16 Nov 2021 12:10)                           MEDICATIONS:  STANDING MEDICATIONS  aspirin enteric coated 81 milliGRAM(s) Oral daily  atorvastatin 80 milliGRAM(s) Oral at bedtime  budesonide 160 MICROgram(s)/formoterol 4.5 MICROgram(s) Inhaler 2 Puff(s) Inhalation two times a day  buMETAnide Infusion 1 mG/Hr IV Continuous <Continuous>  clopidogrel Tablet 75 milliGRAM(s) Oral daily  enoxaparin Injectable 40 milliGRAM(s) SubCutaneous every 24 hours  fenofibrate Tablet 145 milliGRAM(s) Oral daily  influenza   Vaccine 0.5 milliLiter(s) IntraMuscular once  insulin lispro (ADMELOG) corrective regimen sliding scale   SubCutaneous three times a day before meals  metoprolol succinate ER 50 milliGRAM(s) Oral daily  omega-3-Acid Ethyl Esters 2 Gram(s) Oral two times a day  pantoprazole    Tablet 40 milliGRAM(s) Oral before breakfast  QUEtiapine 25 milliGRAM(s) Oral at bedtime  sacubitril 97 mG/valsartan 103 mG 1 Tablet(s) Oral two times a day  spironolactone 50 milliGRAM(s) Oral daily    PRN MEDICATIONS  acetaminophen     Tablet .. 650 milliGRAM(s) Oral every 6 hours PRN  ALBUTerol    90 MICROgram(s) HFA Inhaler 2 Puff(s) Inhalation every 6 hours PRN  albuterol/ipratropium for Nebulization 3 milliLiter(s) Nebulizer every 6 hours PRN  melatonin 3 milliGRAM(s) Oral at bedtime PRN                                            ------------------------------------------------------------  VITAL SIGNS: Last 24 Hours  T(C): 35.9 (23 Mar 2022 09:30), Max: 36 (22 Mar 2022 21:00)  T(F): 96.6 (23 Mar 2022 09:30), Max: 96.8 (22 Mar 2022 21:00)  HR: 98 (23 Mar 2022 09:30) (78 - 98)  BP: 113/60 (23 Mar 2022 09:30) (100/66 - 116/73)  BP(mean): --  RR: 18 (23 Mar 2022 09:30) (18 - 18)  SpO2: 98% (22 Mar 2022 21:56) (98% - 100%)      03-22-22 @ 07:01  -  03-23-22 @ 07:00  --------------------------------------------------------  IN: 300 mL / OUT: 1650 mL / NET: -1350 mL    03-23-22 @ 07:01  -  03-23-22 @ 13:49  --------------------------------------------------------  IN: 768 mL / OUT: 1200 mL / NET: -432 mL                                             --------------------------------------------------------------  LABS:                        12.8   4.45  )-----------( 224      ( 23 Mar 2022 06:00 )             40.2     03-23    137  |  100  |  11  ----------------------------<  128<H>  4.5   |  26  |  0.9    Ca    8.8      23 Mar 2022 06:00  Mg     2.2     03-23    TPro  6.4  /  Alb  3.4<L>  /  TBili  1.4<H>  /  DBili  x   /  AST  16  /  ALT  8   /  AlkPhos  130<H>  03-23                  CARDIAC MARKERS ( 22 Mar 2022 06:30 )  x     / <0.01 ng/mL / x     / x     / x      CARDIAC MARKERS ( 21 Mar 2022 22:07 )  x     / <0.01 ng/mL / x     / x     / x                                                  PHYSICAL EXAM:  General: NAD  LUNGS: crackles at lung bases  HEART: RRR, distant heart sounds  ABDOMEN: soft, nontender  EXT: 3+ pitting edema up to the knees  NEURO: AAOx3, CN 2-12 intact                                             --------------------------------------------------------------    ASSESSMENT & PLAN    57 year old female with PMH of COPD on 3L home O2, CORNELIUS on CPAP, HFrEF (19% on TTE 02/2022, life vest at home), pulmonary HTN, CAD s/p PCI to RCA, HTN, HLD, CVA with residual LLE weakness, DM, recently quit smoking (2 months ago) presented to the ED for worsening bilateral LE swelling.     # bilateral LE edema likely secondary to acute on chronic HFrEF exacerbation  # H/O pulmonary HTN   # H/O PAD with bilateral SFA occlusion  - Echo 02/06/2022: 19% EF, small pericardial effusion, moderate p-HTN, mod-severe MR  - Daily weight. Strict I & Os. Keep I < O. Fluid restriction 1L  - pro-BNP on admission 3425, around baseline.   - CXR performed on admission appears to show cardiomegaly with increased vascular congestion, Follow up official read   - Troponin negative on admission, repeat in AM.  - No ischemic changes on EKG  - Continue metoprolol 50mg PO daily, spironolactone 50mg PO daily, entresto 97-103mg PO BID   - continue with aspirin 81mg PO daily, plavix 75mg PO daily, and atorvastatin 80mg PO daily.   - Monitor electrolytes, keep K>4 and Mg >2.   - patient left life vest at home, will keep on telemetry for now  - Received Lasix 60 IV for one night  - HF evaluated patient, said to give 2mg Bumex push and start on Bumex 1mg/hr drip  - Pt still on Bumex drip on 3/23  -  EP consulted for AICD, pt says will discuss with family  - AICD on hold until patient euvolemic in any case  - Monitor UOP closely with daily weights    # COPD on 3L home O2 - doubt active exacerbation  # CORNELIUS on CPAP   - now on 3L NC, at baseline  - Keep SpO2 88-92%.   - continue with symbicort.   - duonebs PRN   - Incentive spirometry.   - continue with CPAP at night     # H/O CAD s/p PCI to RCA  # H/O CVA with residual L sided weakness   # HTN/ HLD   - continue with aspirin 81mg PO daily, Plavix 75mg PO daily, Metoprolol succinate 50mg PO daily, and fenofibrate 145mg PO daily.     # DM    - monitor FS, if consistently >180, start insulin protocol    # GERD   - continue with protonix     # DVT Prophylaxis: Lovenox  # Diet: DASH/TLC, CC, fluid restriction  # GI Prophylaxis: pantoprazole   # Activity: IAT  # Code Status: Full Code  # Dispo: acute

## 2022-03-23 NOTE — PROGRESS NOTE ADULT - SUBJECTIVE AND OBJECTIVE BOX
WILLIAN MARY  57y Female    INTERVAL HPI/OVERNIGHT EVENTS:    Pt remains volume overloaded  + SOB  unable to sleep (on seroquel at night)  c/o foot pain  no chest pain, N/V, abdominal pain  concerned about primafit (in place per RN)  ROS otherwise negative    T(F): 97.1 (22 @ 13:50), Max: 97.1 (22 @ 13:50)  HR: 88 (22 @ 13:50) (78 - 98)  BP: 121/68 (22 @ 13:50) (100/66 - 121/68)  RR: 18 (22 @ 13:50) (18 - 18)  SpO2: 98% (22 @ 21:56) (98% - 100%) on O2 NC    I&O's Summary    22 Mar 2022 07:01  -  23 Mar 2022 07:00  --------------------------------------------------------  IN: 300 mL / OUT: 1650 mL / NET: -1350 mL    23 Mar 2022 07:01  -  23 Mar 2022 16:31  --------------------------------------------------------  IN: 768 mL / OUT: 1200 mL / NET: -432 mL      Daily Weight in k.9 (23 Mar 2022 05:28)    CAPILLARY BLOOD GLUCOSE      POCT Blood Glucose.: 127 mg/dL (23 Mar 2022 12:13)  POCT Blood Glucose.: 112 mg/dL (23 Mar 2022 08:04)  POCT Blood Glucose.: 138 mg/dL (22 Mar 2022 20:39)  POCT Blood Glucose.: 124 mg/dL (22 Mar 2022 17:39)        PHYSICAL EXAM:  GENERAL: NAD  HEAD:  Normocephalic  EYES:  conjunctiva and sclera clear  ENMT: Moist mucous membranes  NECK: Supple,  NERVOUS SYSTEM:  Alert, awake, Good concentration  CHEST/LUNG: decreased BS b/l  HEART: Regular rate and rhythm  ABDOMEN: Soft, Nontender, mildly distended; Bowel sounds present  EXTREMITIES: ++LE edema b/l  SKIN: warm, dry    Consultant(s) Notes Reviewed:  [x ] YES  [ ] NO  Care Discussed with Consultants/Other Providers [ x] YES  [ ] NO    MEDICATIONS  (STANDING):  aspirin enteric coated 81 milliGRAM(s) Oral daily  atorvastatin 80 milliGRAM(s) Oral at bedtime  budesonide 160 MICROgram(s)/formoterol 4.5 MICROgram(s) Inhaler 2 Puff(s) Inhalation two times a day  buMETAnide Infusion 1 mG/Hr (5 mL/Hr) IV Continuous <Continuous>  clopidogrel Tablet 75 milliGRAM(s) Oral daily  enoxaparin Injectable 40 milliGRAM(s) SubCutaneous every 24 hours  fenofibrate Tablet 145 milliGRAM(s) Oral daily  influenza   Vaccine 0.5 milliLiter(s) IntraMuscular once  insulin lispro (ADMELOG) corrective regimen sliding scale   SubCutaneous three times a day before meals  metoprolol succinate ER 50 milliGRAM(s) Oral daily  omega-3-Acid Ethyl Esters 2 Gram(s) Oral two times a day  pantoprazole    Tablet 40 milliGRAM(s) Oral before breakfast  QUEtiapine 25 milliGRAM(s) Oral at bedtime  sacubitril 97 mG/valsartan 103 mG 1 Tablet(s) Oral two times a day  spironolactone 50 milliGRAM(s) Oral daily    MEDICATIONS  (PRN):  acetaminophen     Tablet .. 650 milliGRAM(s) Oral every 6 hours PRN Temp greater or equal to 38C (100.4F), Mild Pain (1 - 3)  ALBUTerol    90 MICROgram(s) HFA Inhaler 2 Puff(s) Inhalation every 6 hours PRN Shortness of Breath and/or Wheezing  albuterol/ipratropium for Nebulization 3 milliLiter(s) Nebulizer every 6 hours PRN Shortness of Breath and/or Wheezing  melatonin 3 milliGRAM(s) Oral at bedtime PRN Insomnia      Telemetry reviewed by me - NSVT x 1    LABS:                        12.8   4.45  )-----------( 224      ( 23 Mar 2022 06:00 )             40.2     03-23    137  |  100  |  11  ----------------------------<  128<H>  4.5   |  26  |  0.9    Ca    8.8      23 Mar 2022 06:00  Mg     2.2         TPro  6.4  /  Alb  3.4<L>  /  TBili  1.4<H>  /  DBili  x   /  AST  16  /  ALT  8   /  AlkPhos  130<H>        CARDIAC MARKERS ( 22 Mar 2022 06:30 )  x     / <0.01 ng/mL / x     / x     / x      CARDIAC MARKERS ( 21 Mar 2022 22:07 )  x     / <0.01 ng/mL / x     / x     / x              RADIOLOGY & ADDITIONAL TESTS:    Imaging or report Personally Reviewed:  [x ] YES  [ ] NO    < from: TTE Echo Complete w/o Contrast w/ Doppler (22 @ 11:09) >    Summary:   1. Left ventricular ejection fraction, by visual estimation, is 30 to   35%.   2. Mildly enlarged left atrium.   3. Mildly enlarged right atrium.   4. Mild mitral annular calcification.   5. Moderate mitral valve regurgitation.   6. Moderate tricuspid regurgitation.   7. Mild aortic regurgitation.   8. Sclerotic aortic valve with normal opening.    < end of copied text >      Case discussed with residents and RN on rounds today    Care discussed with pt

## 2022-03-23 NOTE — PROGRESS NOTE ADULT - ASSESSMENT
56 y/o woman with PMH of COPD on 3L home O2, CORNELIUS on CPAP, HFrEF (19% on TTE 02/2022, life vest at home), pulmonary HTN, CAD s/p PCI to RCA, HTN, HLD, CVA with residual Left LE weakness, DM, recently quit smoking (2 months ago) presented to the ED for worsening bilateral LE swelling.     1. Acute on Chronic HFrEF  pt remains volume overloaded today  ECHO 3/23/22: EF improved at 30-35%, mod MR and TR  h/o valvular heart disease and pulm HTN  - heart failure f/u appreciated  - continue bumex at 1mg/hr  - continue toprol, Entresto, spironolactone  - monitor renal function and electrolytes  - daily wts, I's and O's  - low sodium diet, fluid restriction 1.5L/day  - EP f/u  - very guarded prognosis    2. Hx of CAD s/p PCI  - continue medical therapy    3. HTN on metoprolol    4. h/o CVA on ASA/Plavix/statin    5. COPD on home O2 - on nebs, symbicort    6. CORNELIUS on CPAP qhs    7. DM on insulin    8. DVT PPX:  Lovenox      Progress Note Handoff  Pending (specify): Cont IV diuresis, heart failure and EP f/u, labs in AM    pt informed of the plan of care    Disposition: home with services

## 2022-03-23 NOTE — PROGRESS NOTE ADULT - ASSESSMENT
Acute on chronic systolic HF / Fluid overload / pulmonary HTN / COPD / DM2 / Anxiety     Patient remains grossly fluid overloaded on exam  Continue Bumex gtt at 1 mg/hr  Continue Entresto  mg BID   Continue metoprolol XL 50 mg daily   Continue Spironolactone 50mg daily   Get BMP twice daily   Maintain potassium >4.0, Mg >2.1  Strict intake and output  Daily weight   Physical therapy / Incentive spirometer   Will continue to follow   Acute on chronic systolic HF / Fluid overload / pulmonary HTN / COPD / DM2 / Anxiety     Patient remains grossly fluid overloaded on exam  Continue Bumex gtt at 1 mg/hr  Continue Entresto  mg BID   Continue metoprolol XL 50 mg daily   Continue Spironolactone 50mg daily   Get BMP twice daily   Maintain potassium >4.0, Mg >2.1  Strict intake and output  Daily weight   Physical therapy / Incentive spirometer   Will continue to follow.

## 2022-03-23 NOTE — PROGRESS NOTE ADULT - SUBJECTIVE AND OBJECTIVE BOX
Date of Admission: 3/21/22    Interval History:  - Patient assessed in bed, NAD, requiring less O2 than yesterday  - Reports feeling better, breathing improving     Chief Complaint: Patient is a 57y old  Female who presents with a chief complaint of B/L edema    HISTORY OF PRESENT ILLNESS: 57 year old female with PMH of COPD on 3L home O2, CORNELIUS on CPAP, HFrEF (19% on TTE 2022, life vest at home), pulmonary HTN, CAD s/p PCI to RCA, HTN, HLD, CVA with residual LLE weakness, DM, recently quit smoking (2 months ago) presented to the ED for worsening bilateral LE swelling. Patient had noted that for the past week prior to admission, her legs began to have progressively worsening swelling and are now tender. The day of admission, patient was in the bath and complained to the HHA who called 911, so she arrived in the ED without her life vest. Patient was discharged from the hospital on  after being diuresed for acute on chronic HFrEF exacerbation. She states that she has been compliant with her diet and medications, denies any NSAID use. She was not able to follow up with any physicians since her last discharge. Otherwise denies any fevers, chills, chest pain, cough, SOB, or urinary symptoms.   In the ED, HR was 104, vitals otherwise stable. Lab work was remarkable for pro-BNP 3452. Given Lasix 40mg IV in the ED and admitted to medicine for further work up and management.  (22 Mar 2022 00:01)      PAST MEDICAL & SURGICAL HISTORY:  Hypertension  Hyperlipidemia  Anxiety and depression  COPD, severe  CHF (congestive heart failure)  Cerebrovascular accident (CVA)Multiple  Type 2 diabetes mellitus  CKD (chronic kidney disease), stage II  No significant past surgical history        FAMILY HISTORY:  No pertinent family history of premature cardiovascular disease in first degree relatives.  Mother:  of cancer at 65  Father:  of an overdose at 50    SOCIAL HISTORY: Former smoker- states she quit since her last admission a month ago. Denies alcohol or drug use.       Allergies  No Known Allergies    Intolerances    PHYSICAL EXAM:  General Appearance: well appearing, normal for age and gender. 	  Neck: unable to assess due to body habitus  Eyes: Extra Ocular muscles intact.   Cardiovascular: regular rate and rhythm S1 S2, , No murmurs,+3 B/L edema  Respiratory: B/L anterior expiratory wheezing, posterior lung fields clear  Psychiatry: Alert and oriented x 3, Mood & affect appropriate  Gastrointestinal:  Soft, Non-tender  Skin/Integumentary : No rashes, No ecchymoses, No cyanosis	  Neurologic: Non-focal  Musculoskeletal/ extremities: Normal range of motion, No clubbing, cyanosis   Vascular: Peripheral pulses palpable 2+ bilaterally    CARDIAC MARKERS:  Serum Pro-Brain Natriuretic Peptide: 4892 pg/mL (22 @ 10:50)      TELEMETRY EVENTS: 	    ECG:  	3/21/22      Ventricular Rate 103 BPM  Atrial Rate 103 BPM  P-R Interval 172 ms  QRS Duration 152 ms  Q-T Interval 390 ms  QTC Calculation(Bazett) 510 ms  P Axis 91 degrees  R Axis 196 degrees  T Axis 33 degrees    Diagnosis Line *** Suspect armlead reversal, interpretation assumes no reversal  Sinus tachycardia  Possible Left atrial enlargement  Non-specific intra-ventricular conduction block  Lateral infarct , age undetermined  Abnormal ECG    Confirmed by CARLOTA GAUTHIER MD ([id number]) on 3/22/2022 9:01:55 AM          PREVIOUS DIAGNOSTIC TESTING:      TTE 22    Summary:   1. LV Ejection Fraction by Urena's Method with a biplane EF of 19 %.   2. Severely decreased global left ventricular systolic function.   3. Dilated RV with moderately reduced systolic function.   4. Moderately enlarged left atrium.   5. Severely enlarged right atrium.   6. Sclerotic aorticvalve with normal opening.   7. Mild aortic regurgitation.   8. The mitral valve leaflets are tethered due to reduced systolic   function and elevated LVDP.   9. Moderate-to-severe mitral regurgitation.  10. Severe tricuspid regurgitation.  11. Estimated pulmonary artery systolic pressure is 53.7 mmHg assuming a   right atrial pressure of 15 mmHg, which is consistent with moderate   pulmonary hypertension.  12. Small pericardial effusion adjacent to the right atrium.        TTE 21    Summary:   1. Left ventricular ejection fraction, by visual estimation, is 35 to 40%.   2.Moderately decreased global left ventricular systolic function.   3. Multiple left ventricular regional wall motion abnormalities exist. See wall motion findings.   4. Elevated left atrial and left ventricular end-diastolic pressures.   5. Mild concentric left ventricular hypertrophy.   6. Mildly increased LV wall thickness.   7. Normal left ventricular internal cavity size.   8. Spectral Doppler shows restrictive pattern of left ventricular myocardial filling (Grade III diastolic dysfunction).  9. Moderately reduced RV systolic function.  10. Mildly enlarged left atrium.  11. Moderately enlarged right atrium.  12. Small pericardial effusion.  13. Mild to moderate mitral valve regurgitation.  14. Moderate-severe tricuspid regurgitation.  15.Mild aortic regurgitation.  16. Sclerotic aortic valve with normal opening.  17. Estimated pulmonary artery systolic pressure is 50.0 mmHg assuming a right atrial pressure of 15 mmHg, which is consistent with moderate pulmonary hypertension.  18. Pulmonary hypertension is present.  19. LA volume Index is 36.7 ml/m² ml/m2.    	    Home Medications:  Albuterol (Eqv-ProAir HFA) 90 mcg/inh inhalation aerosol: 2 puff(s) inhaled every 6 hours, As Needed (2021 11:22)  aspirin 81 mg oral tablet: 1 tab(s) orally once a day (2021 11:22)  fenofibrate 145 mg oral tablet: 1 tab(s) orally once a day (2021 11:22)  glipiZIDE 10 mg oral tablet: 1 tab(s) orally 2 times a day (2021 11:22)  Lovaza 1000 mg oral capsule: 2 cap(s) orally 2 times a day (2021 14:52)  metFORMIN 1000 mg oral tablet: 1 tab(s) orally 2 times a day Do not take until  (2021 12:58)  Plavix 75 mg oral tablet: 1 tab(s) orally once a day (2021 11:22)  Vitamin D2 1.25 mg (50,000 intl units) oral capsule: 1 cap(s) orally once a week (2021 14:52)    MEDICATIONS  (STANDING):  aspirin  chewable 81 milliGRAM(s) Oral daily  atorvastatin 80 milliGRAM(s) Oral at bedtime  budesonide 160 MICROgram(s)/formoterol 4.5 MICROgram(s) Inhaler 2 Puff(s) Inhalation two times a day  chlorhexidine 4% Liquid 1 Application(s) Topical <User Schedule>  clopidogrel Tablet 75 milliGRAM(s) Oral daily  enoxaparin Injectable 40 milliGRAM(s) SubCutaneous daily  fenofibrate Tablet 145 milliGRAM(s) Oral daily  furosemide   Injectable 40 milliGRAM(s) IV Push two times a day  magnesium sulfate  IVPB 2 Gram(s) IV Intermittent once  metoprolol succinate ER 50 milliGRAM(s) Oral daily  omega-3-Acid Ethyl Esters 2 Gram(s) Oral two times a day  pantoprazole    Tablet 40 milliGRAM(s) Oral before breakfast  sacubitril 49 mG/valsartan 51 mG 1 Tablet(s) Oral two times a day    MEDICATIONS  (PRN):  ALBUTerol    90 MICROgram(s) HFA Inhaler 2 Puff(s) Inhalation every 6 hours PRN Shortness of Breath and/or Wheezing

## 2022-03-24 LAB
ALBUMIN SERPL ELPH-MCNC: 3.5 G/DL — SIGNIFICANT CHANGE UP (ref 3.5–5.2)
ALP SERPL-CCNC: 121 U/L — HIGH (ref 30–115)
ALT FLD-CCNC: 7 U/L — SIGNIFICANT CHANGE UP (ref 0–41)
ANION GAP SERPL CALC-SCNC: 14 MMOL/L — SIGNIFICANT CHANGE UP (ref 7–14)
ANION GAP SERPL CALC-SCNC: 15 MMOL/L — HIGH (ref 7–14)
AST SERPL-CCNC: 14 U/L — SIGNIFICANT CHANGE UP (ref 0–41)
BASOPHILS # BLD AUTO: 0.09 K/UL — SIGNIFICANT CHANGE UP (ref 0–0.2)
BASOPHILS NFR BLD AUTO: 1.7 % — HIGH (ref 0–1)
BILIRUB SERPL-MCNC: 1.4 MG/DL — HIGH (ref 0.2–1.2)
BUN SERPL-MCNC: 13 MG/DL — SIGNIFICANT CHANGE UP (ref 10–20)
BUN SERPL-MCNC: 14 MG/DL — SIGNIFICANT CHANGE UP (ref 10–20)
CALCIUM SERPL-MCNC: 9 MG/DL — SIGNIFICANT CHANGE UP (ref 8.5–10.1)
CALCIUM SERPL-MCNC: 9.1 MG/DL — SIGNIFICANT CHANGE UP (ref 8.5–10.1)
CHLORIDE SERPL-SCNC: 100 MMOL/L — SIGNIFICANT CHANGE UP (ref 98–110)
CHLORIDE SERPL-SCNC: 98 MMOL/L — SIGNIFICANT CHANGE UP (ref 98–110)
CO2 SERPL-SCNC: 25 MMOL/L — SIGNIFICANT CHANGE UP (ref 17–32)
CO2 SERPL-SCNC: 29 MMOL/L — SIGNIFICANT CHANGE UP (ref 17–32)
CREAT SERPL-MCNC: 1 MG/DL — SIGNIFICANT CHANGE UP (ref 0.7–1.5)
CREAT SERPL-MCNC: 1.1 MG/DL — SIGNIFICANT CHANGE UP (ref 0.7–1.5)
EGFR: 59 ML/MIN/1.73M2 — LOW
EGFR: 67 ML/MIN/1.73M2 — SIGNIFICANT CHANGE UP
EOSINOPHIL # BLD AUTO: 0.09 K/UL — SIGNIFICANT CHANGE UP (ref 0–0.7)
EOSINOPHIL NFR BLD AUTO: 1.7 % — SIGNIFICANT CHANGE UP (ref 0–8)
GLUCOSE BLDC GLUCOMTR-MCNC: 102 MG/DL — HIGH (ref 70–99)
GLUCOSE BLDC GLUCOMTR-MCNC: 105 MG/DL — HIGH (ref 70–99)
GLUCOSE BLDC GLUCOMTR-MCNC: 155 MG/DL — HIGH (ref 70–99)
GLUCOSE BLDC GLUCOMTR-MCNC: 185 MG/DL — HIGH (ref 70–99)
GLUCOSE SERPL-MCNC: 116 MG/DL — HIGH (ref 70–99)
GLUCOSE SERPL-MCNC: 126 MG/DL — HIGH (ref 70–99)
HCT VFR BLD CALC: 40.7 % — SIGNIFICANT CHANGE UP (ref 37–47)
HGB BLD-MCNC: 12.9 G/DL — SIGNIFICANT CHANGE UP (ref 12–16)
IMM GRANULOCYTES NFR BLD AUTO: 0.4 % — HIGH (ref 0.1–0.3)
LYMPHOCYTES # BLD AUTO: 1.25 K/UL — SIGNIFICANT CHANGE UP (ref 1.2–3.4)
LYMPHOCYTES # BLD AUTO: 24.1 % — SIGNIFICANT CHANGE UP (ref 20.5–51.1)
MAGNESIUM SERPL-MCNC: 1.5 MG/DL — LOW (ref 1.8–2.4)
MAGNESIUM SERPL-MCNC: 1.6 MG/DL — LOW (ref 1.8–2.4)
MCHC RBC-ENTMCNC: 29.5 PG — SIGNIFICANT CHANGE UP (ref 27–31)
MCHC RBC-ENTMCNC: 31.7 G/DL — LOW (ref 32–37)
MCV RBC AUTO: 92.9 FL — SIGNIFICANT CHANGE UP (ref 81–99)
MONOCYTES # BLD AUTO: 0.42 K/UL — SIGNIFICANT CHANGE UP (ref 0.1–0.6)
MONOCYTES NFR BLD AUTO: 8.1 % — SIGNIFICANT CHANGE UP (ref 1.7–9.3)
NEUTROPHILS # BLD AUTO: 3.31 K/UL — SIGNIFICANT CHANGE UP (ref 1.4–6.5)
NEUTROPHILS NFR BLD AUTO: 64 % — SIGNIFICANT CHANGE UP (ref 42.2–75.2)
NRBC # BLD: 0 /100 WBCS — SIGNIFICANT CHANGE UP (ref 0–0)
PLATELET # BLD AUTO: 229 K/UL — SIGNIFICANT CHANGE UP (ref 130–400)
POTASSIUM SERPL-MCNC: 4.2 MMOL/L — SIGNIFICANT CHANGE UP (ref 3.5–5)
POTASSIUM SERPL-MCNC: 4.7 MMOL/L — SIGNIFICANT CHANGE UP (ref 3.5–5)
POTASSIUM SERPL-SCNC: 4.2 MMOL/L — SIGNIFICANT CHANGE UP (ref 3.5–5)
POTASSIUM SERPL-SCNC: 4.7 MMOL/L — SIGNIFICANT CHANGE UP (ref 3.5–5)
PROT SERPL-MCNC: 6.6 G/DL — SIGNIFICANT CHANGE UP (ref 6–8)
RBC # BLD: 4.38 M/UL — SIGNIFICANT CHANGE UP (ref 4.2–5.4)
RBC # FLD: 18 % — HIGH (ref 11.5–14.5)
SODIUM SERPL-SCNC: 140 MMOL/L — SIGNIFICANT CHANGE UP (ref 135–146)
SODIUM SERPL-SCNC: 141 MMOL/L — SIGNIFICANT CHANGE UP (ref 135–146)
WBC # BLD: 5.18 K/UL — SIGNIFICANT CHANGE UP (ref 4.8–10.8)
WBC # FLD AUTO: 5.18 K/UL — SIGNIFICANT CHANGE UP (ref 4.8–10.8)

## 2022-03-24 PROCEDURE — 99233 SBSQ HOSP IP/OBS HIGH 50: CPT

## 2022-03-24 PROCEDURE — 71045 X-RAY EXAM CHEST 1 VIEW: CPT | Mod: 26

## 2022-03-24 RX ADMIN — Medication 81 MILLIGRAM(S): at 12:07

## 2022-03-24 RX ADMIN — SPIRONOLACTONE 50 MILLIGRAM(S): 25 TABLET, FILM COATED ORAL at 06:03

## 2022-03-24 RX ADMIN — Medication 2 GRAM(S): at 17:16

## 2022-03-24 RX ADMIN — BUDESONIDE AND FORMOTEROL FUMARATE DIHYDRATE 2 PUFF(S): 160; 4.5 AEROSOL RESPIRATORY (INHALATION) at 08:42

## 2022-03-24 RX ADMIN — ATORVASTATIN CALCIUM 80 MILLIGRAM(S): 80 TABLET, FILM COATED ORAL at 22:08

## 2022-03-24 RX ADMIN — BUDESONIDE AND FORMOTEROL FUMARATE DIHYDRATE 2 PUFF(S): 160; 4.5 AEROSOL RESPIRATORY (INHALATION) at 20:38

## 2022-03-24 RX ADMIN — SACUBITRIL AND VALSARTAN 1 TABLET(S): 24; 26 TABLET, FILM COATED ORAL at 17:16

## 2022-03-24 RX ADMIN — BUMETANIDE 5 MG/HR: 0.25 INJECTION INTRAMUSCULAR; INTRAVENOUS at 17:15

## 2022-03-24 RX ADMIN — Medication 1: at 17:14

## 2022-03-24 RX ADMIN — PANTOPRAZOLE SODIUM 40 MILLIGRAM(S): 20 TABLET, DELAYED RELEASE ORAL at 06:03

## 2022-03-24 RX ADMIN — Medication 2 GRAM(S): at 06:05

## 2022-03-24 RX ADMIN — ENOXAPARIN SODIUM 40 MILLIGRAM(S): 100 INJECTION SUBCUTANEOUS at 06:04

## 2022-03-24 RX ADMIN — SACUBITRIL AND VALSARTAN 1 TABLET(S): 24; 26 TABLET, FILM COATED ORAL at 06:03

## 2022-03-24 RX ADMIN — CLOPIDOGREL BISULFATE 75 MILLIGRAM(S): 75 TABLET, FILM COATED ORAL at 12:08

## 2022-03-24 RX ADMIN — Medication 145 MILLIGRAM(S): at 12:07

## 2022-03-24 RX ADMIN — QUETIAPINE FUMARATE 100 MILLIGRAM(S): 200 TABLET, FILM COATED ORAL at 22:08

## 2022-03-24 RX ADMIN — Medication 50 MILLIGRAM(S): at 06:03

## 2022-03-24 NOTE — PROGRESS NOTE ADULT - ASSESSMENT
Acute on chronic systolic HF / Fluid overload / pulmonary HTN / COPD / DM2 / Anxiety     Patient remains grossly fluid overloaded on exam  Continue Bumex gtt at 1 mg/hr  Continue Entresto  mg BID   Continue metoprolol XL 50 mg daily   Continue Spironolactone 50mg daily   Get BMP twice daily   Maintain potassium >4.0, Mg >2.1  Strict intake and output  Daily weight   Physical therapy / Incentive spirometer   Will continue to follow.   Acute on chronic systolic HF / Fluid overload / pulmonary HTN / COPD / DM2 / Anxiety     Patient remains grossly fluid overloaded on exam  Continue Bumex gtt at 1 mg/hr  Continue Entresto  mg BID   Continue metoprolol XL 50 mg daily   Continue Spironolactone 50mg daily   Get BMP twice daily   Maintain potassium >4.0, Mg >2.1  Strict intake and output  Daily weight   Physical therapy / Incentive spirometer   Will continue to follow

## 2022-03-24 NOTE — PROGRESS NOTE ADULT - SUBJECTIVE AND OBJECTIVE BOX
Date of Admission: 3/21/22    Interval History:        Chief Complaint: Patient is a 57y old  Female who presents with a chief complaint of B/L edema    HISTORY OF PRESENT ILLNESS: 57 year old female with PMH of COPD on 3L home O2, CORNELIUS on CPAP, HFrEF (19% on TTE 2022, life vest at home), pulmonary HTN, CAD s/p PCI to RCA, HTN, HLD, CVA with residual LLE weakness, DM, recently quit smoking (2 months ago) presented to the ED for worsening bilateral LE swelling. Patient had noted that for the past week prior to admission, her legs began to have progressively worsening swelling and are now tender. The day of admission, patient was in the bath and complained to the HHA who called 911, so she arrived in the ED without her life vest. Patient was discharged from the hospital on  after being diuresed for acute on chronic HFrEF exacerbation. She states that she has been compliant with her diet and medications, denies any NSAID use. She was not able to follow up with any physicians since her last discharge. Otherwise denies any fevers, chills, chest pain, cough, SOB, or urinary symptoms.   In the ED, HR was 104, vitals otherwise stable. Lab work was remarkable for pro-BNP 3452. Given Lasix 40mg IV in the ED and admitted to medicine for further work up and management.  (22 Mar 2022 00:01)      PAST MEDICAL & SURGICAL HISTORY:  Hypertension  Hyperlipidemia  Anxiety and depression  COPD, severe  CHF (congestive heart failure)  Cerebrovascular accident (CVA)Multiple  Type 2 diabetes mellitus  CKD (chronic kidney disease), stage II  No significant past surgical history        FAMILY HISTORY:  No pertinent family history of premature cardiovascular disease in first degree relatives.  Mother:  of cancer at 65  Father:  of an overdose at 50    SOCIAL HISTORY: Former smoker- states she quit since her last admission a month ago. Denies alcohol or drug use.       Allergies  No Known Allergies    Intolerances    PHYSICAL EXAM:  General Appearance: well appearing, normal for age and gender. 	  Neck: unable to assess due to body habitus  Cardiovascular: regular rate and rhythm S1 S2, , No murmurs,+3 B/L edema  Respiratory: B/L anterior expiratory wheezing, posterior lung fields clear  Psychiatry: Alert and oriented x 3, Mood & affect appropriate  Gastrointestinal:  Soft, Non-tender  Skin/Integumentary : No rashes, No ecchymoses, No cyanosis	  Neurologic: Non-focal  Musculoskeletal/ extremities: Normal range of motion, No clubbing, cyanosis   Vascular: Peripheral pulses palpable 2+ bilaterally    CARDIAC MARKERS:  Serum Pro-Brain Natriuretic Peptide: 4892 pg/mL (22 @ 10:50)      TELEMETRY EVENTS: 	    ECG:  	3/21/22      Ventricular Rate 103 BPM  Atrial Rate 103 BPM  P-R Interval 172 ms  QRS Duration 152 ms  Q-T Interval 390 ms  QTC Calculation(Bazett) 510 ms  P Axis 91 degrees  R Axis 196 degrees  T Axis 33 degrees    Diagnosis Line *** Suspect armlead reversal, interpretation assumes no reversal  Sinus tachycardia  Possible Left atrial enlargement  Non-specific intra-ventricular conduction block  Lateral infarct , age undetermined  Abnormal ECG    Confirmed by CARLOTA GAUTHIER MD [id number]) on 3/22/2022 9:01:55 AM          PREVIOUS DIAGNOSTIC TESTING:      TTE 22    Summary:   1. LV Ejection Fraction by Urena's Method with a biplane EF of 19 %.   2. Severely decreased global left ventricular systolic function.   3. Dilated RV with moderately reduced systolic function.   4. Moderately enlarged left atrium.   5. Severely enlarged right atrium.   6. Sclerotic aorticvalve with normal opening.   7. Mild aortic regurgitation.   8. The mitral valve leaflets are tethered due to reduced systolic   function and elevated LVDP.   9. Moderate-to-severe mitral regurgitation.  10. Severe tricuspid regurgitation.  11. Estimated pulmonary artery systolic pressure is 53.7 mmHg assuming a   right atrial pressure of 15 mmHg, which is consistent with moderate   pulmonary hypertension.  12. Small pericardial effusion adjacent to the right atrium.        TTE 21    Summary:   1. Left ventricular ejection fraction, by visual estimation, is 35 to 40%.   2.Moderately decreased global left ventricular systolic function.   3. Multiple left ventricular regional wall motion abnormalities exist. See wall motion findings.   4. Elevated left atrial and left ventricular end-diastolic pressures.   5. Mild concentric left ventricular hypertrophy.   6. Mildly increased LV wall thickness.   7. Normal left ventricular internal cavity size.   8. Spectral Doppler shows restrictive pattern of left ventricular myocardial filling (Grade III diastolic dysfunction).  9. Moderately reduced RV systolic function.  10. Mildly enlarged left atrium.  11. Moderately enlarged right atrium.  12. Small pericardial effusion.  13. Mild to moderate mitral valve regurgitation.  14. Moderate-severe tricuspid regurgitation.  15.Mild aortic regurgitation.  16. Sclerotic aortic valve with normal opening.  17. Estimated pulmonary artery systolic pressure is 50.0 mmHg assuming a right atrial pressure of 15 mmHg, which is consistent with moderate pulmonary hypertension.  18. Pulmonary hypertension is present.  19. LA volume Index is 36.7 ml/m² ml/m2.    	    Home Medications:  Albuterol (Eqv-ProAir HFA) 90 mcg/inh inhalation aerosol: 2 puff(s) inhaled every 6 hours, As Needed (2021 11:22)  aspirin 81 mg oral tablet: 1 tab(s) orally once a day (2021 11:22)  fenofibrate 145 mg oral tablet: 1 tab(s) orally once a day (2021 11:22)  glipiZIDE 10 mg oral tablet: 1 tab(s) orally 2 times a day (2021 11:22)  Lovaza 1000 mg oral capsule: 2 cap(s) orally 2 times a day (2021 14:52)  metFORMIN 1000 mg oral tablet: 1 tab(s) orally 2 times a day Do not take until  (2021 12:58)  Plavix 75 mg oral tablet: 1 tab(s) orally once a day (2021 11:22)  Vitamin D2 1.25 mg (50,000 intl units) oral capsule: 1 cap(s) orally once a week (2021 14:52)    MEDICATIONS  (STANDING):  aspirin  chewable 81 milliGRAM(s) Oral daily  atorvastatin 80 milliGRAM(s) Oral at bedtime  budesonide 160 MICROgram(s)/formoterol 4.5 MICROgram(s) Inhaler 2 Puff(s) Inhalation two times a day  chlorhexidine 4% Liquid 1 Application(s) Topical <User Schedule>  clopidogrel Tablet 75 milliGRAM(s) Oral daily  enoxaparin Injectable 40 milliGRAM(s) SubCutaneous daily  fenofibrate Tablet 145 milliGRAM(s) Oral daily  furosemide   Injectable 40 milliGRAM(s) IV Push two times a day  magnesium sulfate  IVPB 2 Gram(s) IV Intermittent once  metoprolol succinate ER 50 milliGRAM(s) Oral daily  omega-3-Acid Ethyl Esters 2 Gram(s) Oral two times a day  pantoprazole    Tablet 40 milliGRAM(s) Oral before breakfast  sacubitril 49 mG/valsartan 51 mG 1 Tablet(s) Oral two times a day    MEDICATIONS  (PRN):  ALBUTerol    90 MICROgram(s) HFA Inhaler 2 Puff(s) Inhalation every 6 hours PRN Shortness of Breath and/or Wheezing         Date of Admission: 3/21/22    Interval History:    - Patient resting in bed, C/O BLE pain, she was not able to sleep last night "was anxious"  - Responding well to diuresis, renal function stable      Chief Complaint: Patient is a 57y old  Female who presents with a chief complaint of B/L edema    HISTORY OF PRESENT ILLNESS: 57 year old female with PMH of COPD on 3L home O2, CORNELIUS on CPAP, HFrEF (19% on TTE 2022, life vest at home), pulmonary HTN, CAD s/p PCI to RCA, HTN, HLD, CVA with residual LLE weakness, DM, recently quit smoking (2 months ago) presented to the ED for worsening bilateral LE swelling. Patient had noted that for the past week prior to admission, her legs began to have progressively worsening swelling and are now tender. The day of admission, patient was in the bath and complained to the HHA who called 911, so she arrived in the ED without her life vest. Patient was discharged from the hospital on  after being diuresed for acute on chronic HFrEF exacerbation. She states that she has been compliant with her diet and medications, denies any NSAID use. She was not able to follow up with any physicians since her last discharge. Otherwise denies any fevers, chills, chest pain, cough, SOB, or urinary symptoms.   In the ED, HR was 104, vitals otherwise stable. Lab work was remarkable for pro-BNP 3452. Given Lasix 40mg IV in the ED and admitted to medicine for further work up and management.  (22 Mar 2022 00:01)      PAST MEDICAL & SURGICAL HISTORY:  Hypertension  Hyperlipidemia  Anxiety and depression  COPD, severe  CHF (congestive heart failure)  Cerebrovascular accident (CVA)Multiple  Type 2 diabetes mellitus  CKD (chronic kidney disease), stage II  No significant past surgical history        FAMILY HISTORY:  No pertinent family history of premature cardiovascular disease in first degree relatives.  Mother:  of cancer at 65  Father:  of an overdose at 50    SOCIAL HISTORY: Former smoker- states she quit since her last admission a month ago. Denies alcohol or drug use.       Allergies  No Known Allergies    Intolerances    PHYSICAL EXAM:  General Appearance: well appearing, normal for age and gender. 	  Neck: unable to assess due to body habitus  Cardiovascular: regular rate and rhythm S1 S2, , No murmurs,+3 B/L edema  Respiratory: B/L anterior expiratory wheezing, posterior lung fields clear  Psychiatry: Alert and oriented x 3, Mood & affect appropriate  Gastrointestinal:  Soft, Non-tender  Skin/Integumentary : No rashes, No ecchymoses, No cyanosis	  Neurologic: Non-focal  Musculoskeletal/ extremities: Normal range of motion, No clubbing, cyanosis   Vascular: Peripheral pulses palpable 2+ bilaterally    CARDIAC MARKERS:  Serum Pro-Brain Natriuretic Peptide: 4892 pg/mL (22 @ 10:50)      TELEMETRY EVENTS: 	    ECG:  	3/21/22      Ventricular Rate 103 BPM  Atrial Rate 103 BPM  P-R Interval 172 ms  QRS Duration 152 ms  Q-T Interval 390 ms  QTC Calculation(Bazett) 510 ms  P Axis 91 degrees  R Axis 196 degrees  T Axis 33 degrees    Diagnosis Line *** Suspect armlead reversal, interpretation assumes no reversal  Sinus tachycardia  Possible Left atrial enlargement  Non-specific intra-ventricular conduction block  Lateral infarct , age undetermined  Abnormal ECG    Confirmed by CARLOTA GAUTHIER MD [id number]) on 3/22/2022 9:01:55 AM          PREVIOUS DIAGNOSTIC TESTING:      TTE 22    Summary:   1. LV Ejection Fraction by Urena's Method with a biplane EF of 19 %.   2. Severely decreased global left ventricular systolic function.   3. Dilated RV with moderately reduced systolic function.   4. Moderately enlarged left atrium.   5. Severely enlarged right atrium.   6. Sclerotic aorticvalve with normal opening.   7. Mild aortic regurgitation.   8. The mitral valve leaflets are tethered due to reduced systolic   function and elevated LVDP.   9. Moderate-to-severe mitral regurgitation.  10. Severe tricuspid regurgitation.  11. Estimated pulmonary artery systolic pressure is 53.7 mmHg assuming a   right atrial pressure of 15 mmHg, which is consistent with moderate   pulmonary hypertension.  12. Small pericardial effusion adjacent to the right atrium.        TTE 21    Summary:   1. Left ventricular ejection fraction, by visual estimation, is 35 to 40%.   2.Moderately decreased global left ventricular systolic function.   3. Multiple left ventricular regional wall motion abnormalities exist. See wall motion findings.   4. Elevated left atrial and left ventricular end-diastolic pressures.   5. Mild concentric left ventricular hypertrophy.   6. Mildly increased LV wall thickness.   7. Normal left ventricular internal cavity size.   8. Spectral Doppler shows restrictive pattern of left ventricular myocardial filling (Grade III diastolic dysfunction).  9. Moderately reduced RV systolic function.  10. Mildly enlarged left atrium.  11. Moderately enlarged right atrium.  12. Small pericardial effusion.  13. Mild to moderate mitral valve regurgitation.  14. Moderate-severe tricuspid regurgitation.  15.Mild aortic regurgitation.  16. Sclerotic aortic valve with normal opening.  17. Estimated pulmonary artery systolic pressure is 50.0 mmHg assuming a right atrial pressure of 15 mmHg, which is consistent with moderate pulmonary hypertension.  18. Pulmonary hypertension is present.  19. LA volume Index is 36.7 ml/m² ml/m2.    	    Home Medications:  Albuterol (Eqv-ProAir HFA) 90 mcg/inh inhalation aerosol: 2 puff(s) inhaled every 6 hours, As Needed (2021 11:22)  aspirin 81 mg oral tablet: 1 tab(s) orally once a day (2021 11:22)  fenofibrate 145 mg oral tablet: 1 tab(s) orally once a day (2021 11:22)  glipiZIDE 10 mg oral tablet: 1 tab(s) orally 2 times a day (2021 11:22)  Lovaza 1000 mg oral capsule: 2 cap(s) orally 2 times a day (2021 14:52)  metFORMIN 1000 mg oral tablet: 1 tab(s) orally 2 times a day Do not take until  (2021 12:58)  Plavix 75 mg oral tablet: 1 tab(s) orally once a day (2021 11:22)  Vitamin D2 1.25 mg (50,000 intl units) oral capsule: 1 cap(s) orally once a week (2021 14:52)    MEDICATIONS  (STANDING):  aspirin  chewable 81 milliGRAM(s) Oral daily  atorvastatin 80 milliGRAM(s) Oral at bedtime  budesonide 160 MICROgram(s)/formoterol 4.5 MICROgram(s) Inhaler 2 Puff(s) Inhalation two times a day  chlorhexidine 4% Liquid 1 Application(s) Topical <User Schedule>  clopidogrel Tablet 75 milliGRAM(s) Oral daily  enoxaparin Injectable 40 milliGRAM(s) SubCutaneous daily  fenofibrate Tablet 145 milliGRAM(s) Oral daily  furosemide   Injectable 40 milliGRAM(s) IV Push two times a day  magnesium sulfate  IVPB 2 Gram(s) IV Intermittent once  metoprolol succinate ER 50 milliGRAM(s) Oral daily  omega-3-Acid Ethyl Esters 2 Gram(s) Oral two times a day  pantoprazole    Tablet 40 milliGRAM(s) Oral before breakfast  sacubitril 49 mG/valsartan 51 mG 1 Tablet(s) Oral two times a day    MEDICATIONS  (PRN):  ALBUTerol    90 MICROgram(s) HFA Inhaler 2 Puff(s) Inhalation every 6 hours PRN Shortness of Breath and/or Wheezing

## 2022-03-24 NOTE — PROGRESS NOTE ADULT - SUBJECTIVE AND OBJECTIVE BOX
CHIEF COMPLAINT:    Patient is a 57y old  Female who presents with a chief complaint of CHF (24 Mar 2022 11:34)      INTERVAL HPI/OVERNIGHT EVENTS:    Patient seen and examined at bedside. No acute overnight events occurred.    ROS: Reports improvement in LE edema. Denies SOB. All other systems are negative.    Vital Signs:    T(F): 96.9 (22 @ 05:41), Max: 97.6 (22 @ 20:27)  HR: 99 (22 @ 10:00) (88 - 105)  BP: 129/80 (22 @ 10:00) (117/69 - 129/80)  RR: 18 (22 @ 10:00) (18 - 18)  SpO2: 98% (22 @ 08:22) (96% - 98%)  I&O's Summary    23 Mar 2022 07:01  -  24 Mar 2022 07:00  --------------------------------------------------------  IN: 2031 mL / OUT: 4100 mL / NET: -2069 mL    24 Mar 2022 07:01  -  24 Mar 2022 13:26  --------------------------------------------------------  IN: 450 mL / OUT: 0 mL / NET: 450 mL      Daily     Daily Weight in k.2 (24 Mar 2022 05:41)  CAPILLARY BLOOD GLUCOSE      POCT Blood Glucose.: 105 mg/dL (24 Mar 2022 11:37)  POCT Blood Glucose.: 102 mg/dL (24 Mar 2022 07:36)  POCT Blood Glucose.: 112 mg/dL (23 Mar 2022 21:08)  POCT Blood Glucose.: 107 mg/dL (23 Mar 2022 16:34)      PHYSICAL EXAM:  GENERAL:  NAD  SKIN: No rashes or lesions  HEENT: Atraumatic. Normocephalic. Anicteric  NECK:  No JVD.   PULMONARY: Clear to ausculation bilaterally. No wheezing. No rales  CVS: Normal S1, S2. Regular rate and rhythm. No murmurs.  ABDOMEN/GI: Soft, Nontender, Nondistended; Bowel sounds are present  EXTREMITIES:  1+ pitting edema  NEUROLOGIC:  Non participatory.  PSYCH: Alert & oriented x 3, normal affect    Consultant(s) Notes Reviewed:  [x ] YES  [ ] NO      LABS:                        12.9   5.18  )-----------( 229      ( 24 Mar 2022 04:30 )             40.7         140  |  100  |  13  ----------------------------<  116<H>  4.2   |  25  |  1.0    Ca    9.0      24 Mar 2022 04:30  Mg     1.6         TPro  6.6  /  Alb  3.5  /  TBili  1.4<H>  /  DBili  x   /  AST  14  /  ALT  7   /  AlkPhos  121<H>        Serum Pro-Brain Natriuretic Peptide: 3452 pg/mL (22 @ 22:07)    Trop <0.01, CKMB --, CK --, 22 @ 06:30  Trop <0.01, CKMB --, CK --, 22 @ 22:07        RADIOLOGY & ADDITIONAL TESTS:  Imaging or report Personally Reviewed:  [ ] YES  [ ] NO    Telemetry reviewed independently - no acute events     Medications:  Standing  aspirin enteric coated 81 milliGRAM(s) Oral daily  atorvastatin 80 milliGRAM(s) Oral at bedtime  budesonide 160 MICROgram(s)/formoterol 4.5 MICROgram(s) Inhaler 2 Puff(s) Inhalation two times a day  buMETAnide Infusion 1 mG/Hr IV Continuous <Continuous>  clopidogrel Tablet 75 milliGRAM(s) Oral daily  enoxaparin Injectable 40 milliGRAM(s) SubCutaneous every 24 hours  fenofibrate Tablet 145 milliGRAM(s) Oral daily  influenza   Vaccine 0.5 milliLiter(s) IntraMuscular once  insulin lispro (ADMELOG) corrective regimen sliding scale   SubCutaneous three times a day before meals  metoprolol succinate ER 50 milliGRAM(s) Oral daily  omega-3-Acid Ethyl Esters 2 Gram(s) Oral two times a day  pantoprazole    Tablet 40 milliGRAM(s) Oral before breakfast  QUEtiapine 100 milliGRAM(s) Oral at bedtime  sacubitril 97 mG/valsartan 103 mG 1 Tablet(s) Oral two times a day  spironolactone 50 milliGRAM(s) Oral daily    PRN Meds  acetaminophen     Tablet .. 650 milliGRAM(s) Oral every 6 hours PRN  ALBUTerol    90 MICROgram(s) HFA Inhaler 2 Puff(s) Inhalation every 6 hours PRN  albuterol/ipratropium for Nebulization 3 milliLiter(s) Nebulizer every 6 hours PRN  melatonin 3 milliGRAM(s) Oral at bedtime PRN      Case discussed with resident  Care discussed with pt

## 2022-03-24 NOTE — PROGRESS NOTE ADULT - SUBJECTIVE AND OBJECTIVE BOX
WILLIAN MARY 57y Female  MRN#: [id number]   CODE STATUS:________        SUBJECTIVE  No acute events overnight. The patient's edema has improved. She reports that she has some difficulty breathing and is requiring a slightly higher level of O2. Afebrile and HD stable.                                           ----------------------------------------------------------  OBJECTIVE  PAST MEDICAL & SURGICAL HISTORY  Hypertension    Hyperlipidemia    Anxiety and depression    COPD, severe    CHF (congestive heart failure)    Cerebrovascular accident (CVA)  Multiple    Type 2 diabetes mellitus    CKD (chronic kidney disease), stage II    No significant past surgical history                                              -----------------------------------------------------------  ALLERGIES:  No Known Allergies                                            ------------------------------------------------------------    HOME MEDICATIONS  Home Medications:  atorvastatin 80 mg oral tablet: 1 tab(s) orally once a day (at bedtime) (16 Feb 2022 13:50)  fenofibrate 145 mg oral tablet: 1 tab(s) orally once a day (16 Feb 2022 13:50)  magnesium oxide 400 mg oral tablet: 1 tab(s) orally 3 times a day (with meals) (14 Jan 2022 12:37)  pantoprazole 40 mg oral delayed release tablet: 1 tab(s) orally once a day (before a meal) (16 Nov 2021 12:10)                           MEDICATIONS:  STANDING MEDICATIONS  aspirin enteric coated 81 milliGRAM(s) Oral daily  atorvastatin 80 milliGRAM(s) Oral at bedtime  budesonide 160 MICROgram(s)/formoterol 4.5 MICROgram(s) Inhaler 2 Puff(s) Inhalation two times a day  buMETAnide Infusion 1 mG/Hr IV Continuous <Continuous>  clopidogrel Tablet 75 milliGRAM(s) Oral daily  enoxaparin Injectable 40 milliGRAM(s) SubCutaneous every 24 hours  fenofibrate Tablet 145 milliGRAM(s) Oral daily  influenza   Vaccine 0.5 milliLiter(s) IntraMuscular once  insulin lispro (ADMELOG) corrective regimen sliding scale   SubCutaneous three times a day before meals  metoprolol succinate ER 50 milliGRAM(s) Oral daily  omega-3-Acid Ethyl Esters 2 Gram(s) Oral two times a day  pantoprazole    Tablet 40 milliGRAM(s) Oral before breakfast  QUEtiapine 100 milliGRAM(s) Oral at bedtime  sacubitril 97 mG/valsartan 103 mG 1 Tablet(s) Oral two times a day  spironolactone 50 milliGRAM(s) Oral daily    PRN MEDICATIONS  acetaminophen     Tablet .. 650 milliGRAM(s) Oral every 6 hours PRN  ALBUTerol    90 MICROgram(s) HFA Inhaler 2 Puff(s) Inhalation every 6 hours PRN  albuterol/ipratropium for Nebulization 3 milliLiter(s) Nebulizer every 6 hours PRN  melatonin 3 milliGRAM(s) Oral at bedtime PRN                                            ------------------------------------------------------------  VITAL SIGNS: Last 24 Hours  T(C): 36.1 (24 Mar 2022 05:41), Max: 36.4 (23 Mar 2022 20:27)  T(F): 96.9 (24 Mar 2022 05:41), Max: 97.6 (23 Mar 2022 20:27)  HR: 99 (24 Mar 2022 10:00) (88 - 105)  BP: 129/80 (24 Mar 2022 10:00) (117/69 - 129/80)  BP(mean): --  RR: 18 (24 Mar 2022 10:00) (18 - 18)  SpO2: 98% (24 Mar 2022 08:22) (96% - 98%)      03-23-22 @ 07:01  -  03-24-22 @ 07:00  --------------------------------------------------------  IN: 2031 mL / OUT: 4100 mL / NET: -2069 mL    03-24-22 @ 07:01  -  03-24-22 @ 11:14  --------------------------------------------------------  IN: 300 mL / OUT: 0 mL / NET: 300 mL                                             --------------------------------------------------------------  LABS:                        12.9   5.18  )-----------( 229      ( 24 Mar 2022 04:30 )             40.7     03-24    140  |  100  |  13  ----------------------------<  116<H>  4.2   |  25  |  1.0    Ca    9.0      24 Mar 2022 04:30  Mg     1.6     03-24    TPro  6.6  /  Alb  3.5  /  TBili  1.4<H>  /  DBili  x   /  AST  14  /  ALT  7   /  AlkPhos  121<H>  03-24          PHYSICAL EXAM:    General: NAD  LUNGS: crackles at lung bases  HEART: RRR, distant heart sounds  ABDOMEN: soft, nontender  EXT: 3+ pitting edema up to the knees, left leg looks improved  NEURO: AAOx3, CN 2-12 intact                                             --------------------------------------------------------------    ASSESSMENT & PLAN    57 year old female with PMH of COPD on 3L home O2, CORNELIUS on CPAP, HFrEF (19% on TTE 02/2022, life vest at home), pulmonary HTN, CAD s/p PCI to RCA, HTN, HLD, CVA with residual LLE weakness, DM, recently quit smoking (2 months ago) presented to the ED for worsening bilateral LE swelling.     # bilateral LE edema likely secondary to acute on chronic HFrEF exacerbation  # H/O pulmonary HTN   # H/O PAD with bilateral SFA occlusion  - Echo 02/06/2022: 19% EF, small pericardial effusion, moderate p-HTN, mod-severe MR  - Daily weight. Strict I & Os. Keep I < O. Fluid restriction 1L  - pro-BNP on admission 3425, around baseline.   - CXR performed on admission appears to show cardiomegaly with increased vascular congestion, Follow up official read   - Troponin negative on admission, repeat in AM  - No ischemic changes on EKG  - Continue metoprolol 50mg PO daily, spironolactone 50mg PO daily, entresto 97-103mg PO BID   - continue with aspirin 81mg PO daily, plavix 75mg PO daily, and atorvastatin 80mg PO daily.   - Monitor electrolytes, keep K>4 and Mg >2  - patient left life vest at home, will keep on telemetry for now  - Received Lasix 60 IV for one night  - HF evaluated patient, said to give 2mg Bumex push and start on Bumex 1mg/hr drip  - Pt still on Bumex drip on 3/24, will discuss with HF about titrating down  - Patient's edema significantly improved over 24 hours  - Left leg improved more than the right leg  - EP consulted for AICD, pt says will discuss with family   - AICD on hold until patient euvolemic in any case   - Monitor UOP closely with daily weights   - Patient about 2.9L over the last 24 hours    # COPD on 3L home O2 - doubt active exacerbation  # CORNELIUS on CPAP   - now on 3L NC, at baseline  - Keep SpO2 88-92%.   - continue with symbicort  - duonebs PRN   - Incentive spirometry  - continue with CPAP at night     # H/O CAD s/p PCI to RCA  # H/O CVA with residual L sided weakness   # HTN/ HLD   - continue with aspirin 81mg PO daily, Plavix 75mg PO daily, Metoprolol succinate 50mg PO daily, and fenofibrate 145mg PO daily.     # DM    - monitor FS, if consistently >180, start insulin protocol    # GERD   - continue with protonix     # DVT Prophylaxis: Lovenox  # Diet: DASH/TLC, CC, fluid restriction  # GI Prophylaxis: pantoprazole   # Activity: IAT  # Code Status: Full Code  # Dispo: acute

## 2022-03-24 NOTE — PROGRESS NOTE ADULT - ASSESSMENT
58 yo F PMHx  COPD on 3L home O2, CORNELIUS on CPAP, HFrEF (19% on TTE 02/2022, life vest at home), pulmonary HTN, CAD s/p PCI to RCA, HTN, HLD, CVA with residual Left LE weakness, DM, recently quit smoking (2 months ago) presented to the ED for worsening bilateral LE swelling.     Acute on Chronic HFrEF  - ECHO 3/23/22: EF improved at 30-35%, mod MR and TR  - heart failure f/u appreciated  - continue bumex at 1mg/hr  - continue toprol, Entresto, spironolactone  - monitor renal function and electrolytes  - daily wts, I's and O's  - low sodium diet, fluid restriction 1.5L/day  - very guarded prognosis, has frequent readmissions    CAD s/p PCI  - continue aspirin, plavix, statin, metoprolol    HTN  - controlled    CVA  - c/w ASA/Plavix/statin    COPD on home O2 - on nebs, symbicort    CORNELIUS on CPAP qhs    DM   - fs controlled    DVT PPX:  Lovenox      Progress Note Handoff  Pending (specify): Cont IV diuresis, heart failure and EP f/u, labs in AM  pt informed of the plan of care  Disposition: home with services

## 2022-03-25 LAB
ALBUMIN SERPL ELPH-MCNC: 3.3 G/DL — LOW (ref 3.5–5.2)
ALP SERPL-CCNC: 117 U/L — HIGH (ref 30–115)
ALT FLD-CCNC: 6 U/L — SIGNIFICANT CHANGE UP (ref 0–41)
ANION GAP SERPL CALC-SCNC: 11 MMOL/L — SIGNIFICANT CHANGE UP (ref 7–14)
ANION GAP SERPL CALC-SCNC: 12 MMOL/L — SIGNIFICANT CHANGE UP (ref 7–14)
AST SERPL-CCNC: 12 U/L — SIGNIFICANT CHANGE UP (ref 0–41)
BASOPHILS # BLD AUTO: 0.05 K/UL — SIGNIFICANT CHANGE UP (ref 0–0.2)
BASOPHILS NFR BLD AUTO: 1 % — SIGNIFICANT CHANGE UP (ref 0–1)
BILIRUB SERPL-MCNC: 1.3 MG/DL — HIGH (ref 0.2–1.2)
BLD GP AB SCN SERPL QL: SIGNIFICANT CHANGE UP
BUN SERPL-MCNC: 14 MG/DL — SIGNIFICANT CHANGE UP (ref 10–20)
BUN SERPL-MCNC: 15 MG/DL — SIGNIFICANT CHANGE UP (ref 10–20)
CALCIUM SERPL-MCNC: 8.9 MG/DL — SIGNIFICANT CHANGE UP (ref 8.5–10.1)
CALCIUM SERPL-MCNC: 9.2 MG/DL — SIGNIFICANT CHANGE UP (ref 8.5–10.1)
CHLORIDE SERPL-SCNC: 93 MMOL/L — LOW (ref 98–110)
CHLORIDE SERPL-SCNC: 95 MMOL/L — LOW (ref 98–110)
CO2 SERPL-SCNC: 34 MMOL/L — HIGH (ref 17–32)
CO2 SERPL-SCNC: 35 MMOL/L — HIGH (ref 17–32)
CREAT SERPL-MCNC: 1 MG/DL — SIGNIFICANT CHANGE UP (ref 0.7–1.5)
CREAT SERPL-MCNC: 1.1 MG/DL — SIGNIFICANT CHANGE UP (ref 0.7–1.5)
EGFR: 59 ML/MIN/1.73M2 — LOW
EGFR: 67 ML/MIN/1.73M2 — SIGNIFICANT CHANGE UP
EOSINOPHIL # BLD AUTO: 0.08 K/UL — SIGNIFICANT CHANGE UP (ref 0–0.7)
EOSINOPHIL NFR BLD AUTO: 1.7 % — SIGNIFICANT CHANGE UP (ref 0–8)
GLUCOSE BLDC GLUCOMTR-MCNC: 129 MG/DL — HIGH (ref 70–99)
GLUCOSE BLDC GLUCOMTR-MCNC: 133 MG/DL — HIGH (ref 70–99)
GLUCOSE BLDC GLUCOMTR-MCNC: 136 MG/DL — HIGH (ref 70–99)
GLUCOSE BLDC GLUCOMTR-MCNC: 97 MG/DL — SIGNIFICANT CHANGE UP (ref 70–99)
GLUCOSE SERPL-MCNC: 107 MG/DL — HIGH (ref 70–99)
GLUCOSE SERPL-MCNC: 145 MG/DL — HIGH (ref 70–99)
HCT VFR BLD CALC: 38.8 % — SIGNIFICANT CHANGE UP (ref 37–47)
HGB BLD-MCNC: 12.3 G/DL — SIGNIFICANT CHANGE UP (ref 12–16)
IMM GRANULOCYTES NFR BLD AUTO: 0.4 % — HIGH (ref 0.1–0.3)
LYMPHOCYTES # BLD AUTO: 1.06 K/UL — LOW (ref 1.2–3.4)
LYMPHOCYTES # BLD AUTO: 21.9 % — SIGNIFICANT CHANGE UP (ref 20.5–51.1)
MAGNESIUM SERPL-MCNC: 1.3 MG/DL — LOW (ref 1.8–2.4)
MAGNESIUM SERPL-MCNC: 2.2 MG/DL — SIGNIFICANT CHANGE UP (ref 1.8–2.4)
MCHC RBC-ENTMCNC: 29.4 PG — SIGNIFICANT CHANGE UP (ref 27–31)
MCHC RBC-ENTMCNC: 31.7 G/DL — LOW (ref 32–37)
MCV RBC AUTO: 92.8 FL — SIGNIFICANT CHANGE UP (ref 81–99)
MONOCYTES # BLD AUTO: 0.34 K/UL — SIGNIFICANT CHANGE UP (ref 0.1–0.6)
MONOCYTES NFR BLD AUTO: 7 % — SIGNIFICANT CHANGE UP (ref 1.7–9.3)
NEUTROPHILS # BLD AUTO: 3.29 K/UL — SIGNIFICANT CHANGE UP (ref 1.4–6.5)
NEUTROPHILS NFR BLD AUTO: 68 % — SIGNIFICANT CHANGE UP (ref 42.2–75.2)
NRBC # BLD: 0 /100 WBCS — SIGNIFICANT CHANGE UP (ref 0–0)
PLATELET # BLD AUTO: 216 K/UL — SIGNIFICANT CHANGE UP (ref 130–400)
POTASSIUM SERPL-MCNC: 3.6 MMOL/L — SIGNIFICANT CHANGE UP (ref 3.5–5)
POTASSIUM SERPL-MCNC: 4.3 MMOL/L — SIGNIFICANT CHANGE UP (ref 3.5–5)
POTASSIUM SERPL-SCNC: 3.6 MMOL/L — SIGNIFICANT CHANGE UP (ref 3.5–5)
POTASSIUM SERPL-SCNC: 4.3 MMOL/L — SIGNIFICANT CHANGE UP (ref 3.5–5)
PROT SERPL-MCNC: 6.3 G/DL — SIGNIFICANT CHANGE UP (ref 6–8)
RBC # BLD: 4.18 M/UL — LOW (ref 4.2–5.4)
RBC # FLD: 18 % — HIGH (ref 11.5–14.5)
SODIUM SERPL-SCNC: 140 MMOL/L — SIGNIFICANT CHANGE UP (ref 135–146)
SODIUM SERPL-SCNC: 140 MMOL/L — SIGNIFICANT CHANGE UP (ref 135–146)
WBC # BLD: 4.84 K/UL — SIGNIFICANT CHANGE UP (ref 4.8–10.8)
WBC # FLD AUTO: 4.84 K/UL — SIGNIFICANT CHANGE UP (ref 4.8–10.8)

## 2022-03-25 PROCEDURE — 99232 SBSQ HOSP IP/OBS MODERATE 35: CPT

## 2022-03-25 PROCEDURE — 99233 SBSQ HOSP IP/OBS HIGH 50: CPT

## 2022-03-25 RX ORDER — MAGNESIUM SULFATE 500 MG/ML
2 VIAL (ML) INJECTION ONCE
Refills: 0 | Status: COMPLETED | OUTPATIENT
Start: 2022-03-25 | End: 2022-03-25

## 2022-03-25 RX ORDER — POTASSIUM CHLORIDE 20 MEQ
40 PACKET (EA) ORAL ONCE
Refills: 0 | Status: DISCONTINUED | OUTPATIENT
Start: 2022-03-25 | End: 2022-03-25

## 2022-03-25 RX ORDER — POTASSIUM CHLORIDE 20 MEQ
40 PACKET (EA) ORAL ONCE
Refills: 0 | Status: COMPLETED | OUTPATIENT
Start: 2022-03-25 | End: 2022-03-25

## 2022-03-25 RX ADMIN — Medication 145 MILLIGRAM(S): at 11:38

## 2022-03-25 RX ADMIN — Medication 50 MILLIGRAM(S): at 05:57

## 2022-03-25 RX ADMIN — Medication 25 GRAM(S): at 07:55

## 2022-03-25 RX ADMIN — CLOPIDOGREL BISULFATE 75 MILLIGRAM(S): 75 TABLET, FILM COATED ORAL at 11:39

## 2022-03-25 RX ADMIN — QUETIAPINE FUMARATE 100 MILLIGRAM(S): 200 TABLET, FILM COATED ORAL at 21:42

## 2022-03-25 RX ADMIN — PANTOPRAZOLE SODIUM 40 MILLIGRAM(S): 20 TABLET, DELAYED RELEASE ORAL at 05:58

## 2022-03-25 RX ADMIN — Medication 2 GRAM(S): at 17:19

## 2022-03-25 RX ADMIN — SACUBITRIL AND VALSARTAN 1 TABLET(S): 24; 26 TABLET, FILM COATED ORAL at 05:58

## 2022-03-25 RX ADMIN — BUDESONIDE AND FORMOTEROL FUMARATE DIHYDRATE 2 PUFF(S): 160; 4.5 AEROSOL RESPIRATORY (INHALATION) at 21:42

## 2022-03-25 RX ADMIN — Medication 81 MILLIGRAM(S): at 11:38

## 2022-03-25 RX ADMIN — Medication 2 GRAM(S): at 05:57

## 2022-03-25 RX ADMIN — Medication 25 GRAM(S): at 11:38

## 2022-03-25 RX ADMIN — ENOXAPARIN SODIUM 40 MILLIGRAM(S): 100 INJECTION SUBCUTANEOUS at 05:57

## 2022-03-25 RX ADMIN — ATORVASTATIN CALCIUM 80 MILLIGRAM(S): 80 TABLET, FILM COATED ORAL at 21:42

## 2022-03-25 RX ADMIN — BUDESONIDE AND FORMOTEROL FUMARATE DIHYDRATE 2 PUFF(S): 160; 4.5 AEROSOL RESPIRATORY (INHALATION) at 07:54

## 2022-03-25 RX ADMIN — SPIRONOLACTONE 50 MILLIGRAM(S): 25 TABLET, FILM COATED ORAL at 05:58

## 2022-03-25 RX ADMIN — BUMETANIDE 5 MG/HR: 0.25 INJECTION INTRAMUSCULAR; INTRAVENOUS at 14:00

## 2022-03-25 RX ADMIN — Medication 25 GRAM(S): at 09:49

## 2022-03-25 RX ADMIN — Medication 40 MILLIEQUIVALENT(S): at 09:50

## 2022-03-25 RX ADMIN — SACUBITRIL AND VALSARTAN 1 TABLET(S): 24; 26 TABLET, FILM COATED ORAL at 17:18

## 2022-03-25 NOTE — PROGRESS NOTE ADULT - SUBJECTIVE AND OBJECTIVE BOX
Date of Admission: 3/21/22    Interval History:    - Patient assessed resting in bed, on 3L NC (home oxygen requirements), NAD  - Responding well to IV diuresis, renal function remains stable    Chief Complaint: Patient is a 57y old  Female who presents with a chief complaint of B/L edema    HISTORY OF PRESENT ILLNESS: 57 year old female with PMH of COPD on 3L home O2, CORNELIUS on CPAP, HFrEF (19% on TTE 2022, life vest at home), pulmonary HTN, CAD s/p PCI to RCA, HTN, HLD, CVA with residual LLE weakness, DM, recently quit smoking (2 months ago) presented to the ED for worsening bilateral LE swelling. Patient had noted that for the past week prior to admission, her legs began to have progressively worsening swelling and are now tender. The day of admission, patient was in the bath and complained to the HHA who called 911, so she arrived in the ED without her life vest. Patient was discharged from the hospital on  after being diuresed for acute on chronic HFrEF exacerbation. She states that she has been compliant with her diet and medications, denies any NSAID use. She was not able to follow up with any physicians since her last discharge. Otherwise denies any fevers, chills, chest pain, cough, SOB, or urinary symptoms.   In the ED, HR was 104, vitals otherwise stable. Lab work was remarkable for pro-BNP 3452. Given Lasix 40mg IV in the ED and admitted to medicine for further work up and management.  (22 Mar 2022 00:01)      PAST MEDICAL & SURGICAL HISTORY:  Hypertension  Hyperlipidemia  Anxiety and depression  COPD, severe  CHF (congestive heart failure)  Cerebrovascular accident (CVA)Multiple  Type 2 diabetes mellitus  CKD (chronic kidney disease), stage II  No significant past surgical history        FAMILY HISTORY:  No pertinent family history of premature cardiovascular disease in first degree relatives.  Mother:  of cancer at 65  Father:  of an overdose at 50    SOCIAL HISTORY: Former smoker- states she quit since her last admission a month ago. Denies alcohol or drug use.       Allergies  No Known Allergies    Intolerances    PHYSICAL EXAM:  General Appearance: well appearing, normal for age and gender. 	  Neck: unable to assess due to body habitus  Cardiovascular: regular rate and rhythm S1 S2, , No murmurs,+3 B/L edema  Respiratory: B/L anterior expiratory wheezing, posterior lung fields clear  Psychiatry: Alert and oriented x 3, Mood & affect appropriate  Gastrointestinal:  Soft, Non-tender  Skin/Integumentary : No rashes, No ecchymoses, No cyanosis	  Neurologic: Non-focal  Musculoskeletal/ extremities: Normal range of motion, No clubbing, cyanosis   Vascular: Peripheral pulses palpable 2+ bilaterally    CARDIAC MARKERS:  Serum Pro-Brain Natriuretic Peptide: 4892 pg/mL (22 @ 10:50)      TELEMETRY EVENTS: 	    ECG:  	3/21/22      Ventricular Rate 103 BPM  Atrial Rate 103 BPM  P-R Interval 172 ms  QRS Duration 152 ms  Q-T Interval 390 ms  QTC Calculation(Bazett) 510 ms  P Axis 91 degrees  R Axis 196 degrees  T Axis 33 degrees    Diagnosis Line *** Suspect armlead reversal, interpretation assumes no reversal  Sinus tachycardia  Possible Left atrial enlargement  Non-specific intra-ventricular conduction block  Lateral infarct , age undetermined  Abnormal ECG    Confirmed by CARLOTA GAUTHIER MD ([id number]) on 3/22/2022 9:01:55 AM          PREVIOUS DIAGNOSTIC TESTING:      TTE 22    Summary:   1. LV Ejection Fraction by Urena's Method with a biplane EF of 19 %.   2. Severely decreased global left ventricular systolic function.   3. Dilated RV with moderately reduced systolic function.   4. Moderately enlarged left atrium.   5. Severely enlarged right atrium.   6. Sclerotic aorticvalve with normal opening.   7. Mild aortic regurgitation.   8. The mitral valve leaflets are tethered due to reduced systolic   function and elevated LVDP.   9. Moderate-to-severe mitral regurgitation.  10. Severe tricuspid regurgitation.  11. Estimated pulmonary artery systolic pressure is 53.7 mmHg assuming a   right atrial pressure of 15 mmHg, which is consistent with moderate   pulmonary hypertension.  12. Small pericardial effusion adjacent to the right atrium.        TTE 21    Summary:   1. Left ventricular ejection fraction, by visual estimation, is 35 to 40%.   2.Moderately decreased global left ventricular systolic function.   3. Multiple left ventricular regional wall motion abnormalities exist. See wall motion findings.   4. Elevated left atrial and left ventricular end-diastolic pressures.   5. Mild concentric left ventricular hypertrophy.   6. Mildly increased LV wall thickness.   7. Normal left ventricular internal cavity size.   8. Spectral Doppler shows restrictive pattern of left ventricular myocardial filling (Grade III diastolic dysfunction).  9. Moderately reduced RV systolic function.  10. Mildly enlarged left atrium.  11. Moderately enlarged right atrium.  12. Small pericardial effusion.  13. Mild to moderate mitral valve regurgitation.  14. Moderate-severe tricuspid regurgitation.  15.Mild aortic regurgitation.  16. Sclerotic aortic valve with normal opening.  17. Estimated pulmonary artery systolic pressure is 50.0 mmHg assuming a right atrial pressure of 15 mmHg, which is consistent with moderate pulmonary hypertension.  18. Pulmonary hypertension is present.  19. LA volume Index is 36.7 ml/m² ml/m2.    	    Home Medications:  Albuterol (Eqv-ProAir HFA) 90 mcg/inh inhalation aerosol: 2 puff(s) inhaled every 6 hours, As Needed (2021 11:22)  aspirin 81 mg oral tablet: 1 tab(s) orally once a day (2021 11:22)  fenofibrate 145 mg oral tablet: 1 tab(s) orally once a day (2021 11:22)  glipiZIDE 10 mg oral tablet: 1 tab(s) orally 2 times a day (2021 11:22)  Lovaza 1000 mg oral capsule: 2 cap(s) orally 2 times a day (2021 14:52)  metFORMIN 1000 mg oral tablet: 1 tab(s) orally 2 times a day Do not take until  (2021 12:58)  Plavix 75 mg oral tablet: 1 tab(s) orally once a day (2021 11:22)  Vitamin D2 1.25 mg (50,000 intl units) oral capsule: 1 cap(s) orally once a week (2021 14:52)    MEDICATIONS  (STANDING):  aspirin  chewable 81 milliGRAM(s) Oral daily  atorvastatin 80 milliGRAM(s) Oral at bedtime  budesonide 160 MICROgram(s)/formoterol 4.5 MICROgram(s) Inhaler 2 Puff(s) Inhalation two times a day  chlorhexidine 4% Liquid 1 Application(s) Topical <User Schedule>  clopidogrel Tablet 75 milliGRAM(s) Oral daily  enoxaparin Injectable 40 milliGRAM(s) SubCutaneous daily  fenofibrate Tablet 145 milliGRAM(s) Oral daily  furosemide   Injectable 40 milliGRAM(s) IV Push two times a day  magnesium sulfate  IVPB 2 Gram(s) IV Intermittent once  metoprolol succinate ER 50 milliGRAM(s) Oral daily  omega-3-Acid Ethyl Esters 2 Gram(s) Oral two times a day  pantoprazole    Tablet 40 milliGRAM(s) Oral before breakfast  sacubitril 49 mG/valsartan 51 mG 1 Tablet(s) Oral two times a day    MEDICATIONS  (PRN):  ALBUTerol    90 MICROgram(s) HFA Inhaler 2 Puff(s) Inhalation every 6 hours PRN Shortness of Breath and/or Wheezing

## 2022-03-25 NOTE — PROGRESS NOTE ADULT - SUBJECTIVE AND OBJECTIVE BOX
WILLIAN MARY 57y Female  MRN#: [id number]   CODE STATUS:________      SUBJECTIVE    No acute events overnight. Patient stable overnight. Afebrile and HD stable. Used CPAP overnight.       OBJECTIVE  PAST MEDICAL & SURGICAL HISTORY  Hypertension    Hyperlipidemia    Anxiety and depression    COPD, severe    CHF (congestive heart failure)    Cerebrovascular accident (CVA)  Multiple    Type 2 diabetes mellitus    CKD (chronic kidney disease), stage II    No significant past surgical history                                              -----------------------------------------------------------  ALLERGIES:  No Known Allergies                                            ------------------------------------------------------------    HOME MEDICATIONS  Home Medications:  atorvastatin 80 mg oral tablet: 1 tab(s) orally once a day (at bedtime) (16 Feb 2022 13:50)  fenofibrate 145 mg oral tablet: 1 tab(s) orally once a day (16 Feb 2022 13:50)  magnesium oxide 400 mg oral tablet: 1 tab(s) orally 3 times a day (with meals) (14 Jan 2022 12:37)  pantoprazole 40 mg oral delayed release tablet: 1 tab(s) orally once a day (before a meal) (16 Nov 2021 12:10)                           MEDICATIONS:  STANDING MEDICATIONS  aspirin enteric coated 81 milliGRAM(s) Oral daily  atorvastatin 80 milliGRAM(s) Oral at bedtime  budesonide 160 MICROgram(s)/formoterol 4.5 MICROgram(s) Inhaler 2 Puff(s) Inhalation two times a day  buMETAnide Infusion 1 mG/Hr IV Continuous <Continuous>  clopidogrel Tablet 75 milliGRAM(s) Oral daily  enoxaparin Injectable 40 milliGRAM(s) SubCutaneous every 24 hours  fenofibrate Tablet 145 milliGRAM(s) Oral daily  influenza   Vaccine 0.5 milliLiter(s) IntraMuscular once  insulin lispro (ADMELOG) corrective regimen sliding scale   SubCutaneous three times a day before meals  magnesium sulfate  IVPB 2 Gram(s) IV Intermittent once  metoprolol succinate ER 50 milliGRAM(s) Oral daily  omega-3-Acid Ethyl Esters 2 Gram(s) Oral two times a day  pantoprazole    Tablet 40 milliGRAM(s) Oral before breakfast  QUEtiapine 100 milliGRAM(s) Oral at bedtime  sacubitril 97 mG/valsartan 103 mG 1 Tablet(s) Oral two times a day  spironolactone 50 milliGRAM(s) Oral daily    PRN MEDICATIONS  acetaminophen     Tablet .. 650 milliGRAM(s) Oral every 6 hours PRN  ALBUTerol    90 MICROgram(s) HFA Inhaler 2 Puff(s) Inhalation every 6 hours PRN  albuterol/ipratropium for Nebulization 3 milliLiter(s) Nebulizer every 6 hours PRN  melatonin 3 milliGRAM(s) Oral at bedtime PRN                                            ------------------------------------------------------------  VITAL SIGNS: Last 24 Hours  T(C): 35.9 (25 Mar 2022 05:31), Max: 36.4 (24 Mar 2022 18:09)  T(F): 96.6 (25 Mar 2022 05:31), Max: 97.6 (24 Mar 2022 18:09)  HR: 97 (25 Mar 2022 09:30) (79 - 98)  BP: 126/69 (25 Mar 2022 09:30) (102/71 - 126/69)  BP(mean): --  RR: 17 (25 Mar 2022 09:30) (17 - 18)  SpO2: 96% (25 Mar 2022 08:10) (96% - 97%)      03-24-22 @ 07:01  -  03-25-22 @ 07:00  --------------------------------------------------------  IN: 1998 mL / OUT: 4900 mL / NET: -2902 mL    03-25-22 @ 07:01  -  03-25-22 @ 10:57  --------------------------------------------------------  IN: 510 mL / OUT: 700 mL / NET: -190 mL                                             --------------------------------------------------------------  LABS:                        12.3   4.84  )-----------( 216      ( 25 Mar 2022 04:30 )             38.8     03-25    140  |  95<L>  |  14  ----------------------------<  107<H>  3.6   |  34<H>  |  1.0    Ca    8.9      25 Mar 2022 04:30  Mg     1.3     03-25    TPro  6.3  /  Alb  3.3<L>  /  TBili  1.3<H>  /  DBili  x   /  AST  12  /  ALT  6   /  AlkPhos  117<H>  03-25                      PHYSICAL EXAM:      General: NAD  LUNGS: crackles at lung bases  HEART: RRR, distant heart sounds  ABDOMEN: soft, nontender  EXT: 2+ pitting edema up to the knees, improved from previous  NEURO: AAOx3, CN 2-12 intact                                             --------------------------------------------------------------    ASSESSMENT & PLAN    57 year old female with PMH of COPD on 3L home O2, CORNELIUS on CPAP, HFrEF (19% on TTE 02/2022, life vest at home), pulmonary HTN, CAD s/p PCI to RCA, HTN, HLD, CVA with residual LLE weakness, DM, recently quit smoking (2 months ago) presented to the ED for worsening bilateral LE swelling.     # bilateral LE edema likely secondary to acute on chronic HFrEF exacerbation  # H/O pulmonary HTN   # H/O PAD with bilateral SFA occlusion  - Echo 02/06/2022: 19% EF, small pericardial effusion, moderate p-HTN, mod-severe MR  - Daily weight. Strict I & Os. Keep I < O. Fluid restriction 1L  - pro-BNP on admission 3425, around baseline.   - CXR performed on admission appears to show cardiomegaly with increased vascular congestion, Follow up official read   - Troponin negative on admission, repeat in AM  - No ischemic changes on EKG  - Continue metoprolol 50mg PO daily, spironolactone 50mg PO daily, entresto 97-103mg PO BID   - continue with aspirin 81mg PO daily, plavix 75mg PO daily, and atorvastatin 80mg PO daily.   - Monitor electrolytes, keep K>4 and Mg >2  - patient left life vest at home, will keep on telemetry for now  - Received Lasix 60 IV for one night  - HF evaluated patient, said to give 2mg Bumex push and start on Bumex 1mg/hr drip  - Pt still on Bumex drip on 3/25, will discuss with HF about titrating down  - Patient's edema significantly improved over 24 hours  - Both legs now improved  - EP consulted for AICD, pt says will discuss with family   - AICD on hold until patient euvolemic in any case   - Monitor UOP closely with daily weights   - Patient about 4.9L output over the last 24 hours  - Given 3 doses of mag on 3/25    # COPD on 3L home O2 - doubt active exacerbation  # CORNELIUS on CPAP   - now on 3L NC, at baseline  - Keep SpO2 88-92%.   - continue with symbicort  - duonebs PRN   - Incentive spirometry  - continue with CPAP at night     # H/O CAD s/p PCI to RCA  # H/O CVA with residual L sided weakness   # HTN/ HLD   - continue with aspirin 81mg PO daily, Plavix 75mg PO daily, Metoprolol succinate 50mg PO daily, and fenofibrate 145mg PO daily.     # DM    - monitor FS, if consistently >180, start insulin protocol    # GERD   - continue with protonix     # DVT Prophylaxis: Lovenox  # Diet: DASH/TLC, CC, fluid restriction  # GI Prophylaxis: pantoprazole   # Activity: IAT  # Code Status: Full Code  # Dispo: acute

## 2022-03-25 NOTE — PROGRESS NOTE ADULT - SUBJECTIVE AND OBJECTIVE BOX
CHIEF COMPLAINT:    Patient is a 57y old  Female who presents with a chief complaint of CHF     INTERVAL HPI/OVERNIGHT EVENTS:    Patient seen and examined at bedside. No acute overnight events occurred.    ROS: Denies SOB, chest pain. All other systems are negative.    Vital Signs:    T(F): 98.2 (22 @ 13:26), Max: 98.2 (22 @ 13:26)  HR: 96 (22 @ 13:26) (79 - 98)  BP: 121/73 (22 @ 13:26) (102/71 - 126/69)  RR: 17 (22 @ 13:26) (17 - 18)  SpO2: 96% (22 @ 08:10) (96% - 97%)  I&O's Summary    24 Mar 2022 07:01  -  25 Mar 2022 07:00  --------------------------------------------------------  IN: 1998 mL / OUT: 4900 mL / NET: -2902 mL    25 Mar 2022 07:01  -  25 Mar 2022 13:41  --------------------------------------------------------  IN: 540 mL / OUT: 700 mL / NET: -160 mL      Daily     Daily Weight in k.5 (25 Mar 2022 05:31)  CAPILLARY BLOOD GLUCOSE      POCT Blood Glucose.: 133 mg/dL (25 Mar 2022 11:33)  POCT Blood Glucose.: 97 mg/dL (25 Mar 2022 07:28)  POCT Blood Glucose.: 185 mg/dL (24 Mar 2022 21:09)  POCT Blood Glucose.: 155 mg/dL (24 Mar 2022 16:47)      PHYSICAL EXAM:  GENERAL:  NAD  SKIN: No rashes or lesions  HEENT: Atraumatic. Normocephalic. Anicteric  NECK:  No JVD.   PULMONARY: Clear to ausculation bilaterally. No wheezing. No rales  CVS: Normal S1, S2. Regular rate and rhythm. No murmurs.  ABDOMEN/GI: Soft, Nontender, Nondistended; Bowel sounds are present  EXTREMITIES:  1+ pitting edema  NEUROLOGIC:  No motor deficit.  PSYCH: Alert & oriented x 3, normal affect    Consultant(s) Notes Reviewed:  [x ] YES  [ ] NO      LABS:                        12.3   4.84  )-----------( 216      ( 25 Mar 2022 04:30 )             38.8         140  |  95<L>  |  14  ----------------------------<  107<H>  3.6   |  34<H>  |  1.0    Ca    8.9      25 Mar 2022 04:30  Mg     1.3         TPro  6.3  /  Alb  3.3<L>  /  TBili  1.3<H>  /  DBili  x   /  AST  12  /  ALT  6   /  AlkPhos  117<H>        Serum Pro-Brain Natriuretic Peptide: 3452 pg/mL (22 @ 22:07)          RADIOLOGY & ADDITIONAL TESTS:  Imaging or report Personally Reviewed:  [ ] YES  [ ] NO    Telemetry reviewed independently - NSVT    Medications:  Standing  aspirin enteric coated 81 milliGRAM(s) Oral daily  atorvastatin 80 milliGRAM(s) Oral at bedtime  budesonide 160 MICROgram(s)/formoterol 4.5 MICROgram(s) Inhaler 2 Puff(s) Inhalation two times a day  buMETAnide Infusion 1 mG/Hr IV Continuous <Continuous>  clopidogrel Tablet 75 milliGRAM(s) Oral daily  enoxaparin Injectable 40 milliGRAM(s) SubCutaneous every 24 hours  fenofibrate Tablet 145 milliGRAM(s) Oral daily  influenza   Vaccine 0.5 milliLiter(s) IntraMuscular once  insulin lispro (ADMELOG) corrective regimen sliding scale   SubCutaneous three times a day before meals  metoprolol succinate ER 50 milliGRAM(s) Oral daily  omega-3-Acid Ethyl Esters 2 Gram(s) Oral two times a day  pantoprazole    Tablet 40 milliGRAM(s) Oral before breakfast  QUEtiapine 100 milliGRAM(s) Oral at bedtime  sacubitril 97 mG/valsartan 103 mG 1 Tablet(s) Oral two times a day  spironolactone 50 milliGRAM(s) Oral daily    PRN Meds  acetaminophen     Tablet .. 650 milliGRAM(s) Oral every 6 hours PRN  ALBUTerol    90 MICROgram(s) HFA Inhaler 2 Puff(s) Inhalation every 6 hours PRN  albuterol/ipratropium for Nebulization 3 milliLiter(s) Nebulizer every 6 hours PRN  melatonin 3 milliGRAM(s) Oral at bedtime PRN      Case discussed with resident  Care discussed with pt

## 2022-03-25 NOTE — PROGRESS NOTE ADULT - ASSESSMENT
Acute on chronic systolic HF / Fluid overload / pulmonary HTN / COPD / DM2 / Anxiety     Patient remains grossly fluid overloaded on exam- POCUS exam: IVC 2.5cm, non collapsing   Continue Bumex gtt at 1 mg/hr  Continue Entresto  mg BID   Continue metoprolol XL 50 mg daily   Continue Spironolactone 50mg daily   Get BMP twice daily   Mag 1.3, K 3.6- replete   Maintain potassium >4.0, Mg >2.1  Strict intake and output  Daily weight   Physical therapy / Incentive spirometer   Will continue to follow   Acute on chronic systolic HF / Fluid overload / pulmonary HTN / COPD / DM2 / Anxiety     Patient remains grossly fluid overloaded on exam- POCUS exam: IVC 2.5cm, non collapsing   Continue Bumex gtt at 1 mg/hr  Continue Entresto  mg BID   Continue metoprolol XL 50 mg daily   Continue Spironolactone 50mg daily   Get BMP twice daily   Mag 1.3, K 3.6- replete   Maintain potassium >4.0, Mg >2.1  Strict intake and output  Daily weight   Physical therapy / Incentive spirometer   Will continue to follow.   Acute on chronic systolic HF / Fluid overload / pulmonary HTN / COPD / DM2 / Anxiety     Patient remains grossly fluid overloaded on exam- POCUS exam: IVC 2.5cm, non collapsing   Continue Bumex gtt at 1 mg/hr until 3/26 at 6 pm then start torsemide 20 mg BID  Continue Entresto  mg BID   Continue metoprolol XL 50 mg daily   Continue Spironolactone 50mg daily   Get BMP twice daily   Mag 1.3, K 3.6- replete   Maintain potassium >4.0, Mg >2.1  Strict intake and output  Daily weight   Physical therapy / Incentive spirometer   Will continue to follow.

## 2022-03-25 NOTE — PROGRESS NOTE ADULT - ASSESSMENT
58 yo F PMHx  COPD on 3L home O2, CORNELIUS on CPAP, HFrEF (19% on TTE 02/2022, life vest at home), pulmonary HTN, CAD s/p PCI to RCA, HTN, HLD, CVA with residual Left LE weakness, DM, recently quit smoking (2 months ago) presented to the ED for worsening bilateral LE swelling.     Acute on Chronic HFrEF  NSVT  - ECHO 3/23/22: EF improved at 30-35%, mod MR and TR  - heart failure f/u appreciated  - continue bumex at 1mg/hr  - continue toprol, Entresto, spironolactone  - monitor renal function and electrolytes  - daily wts, I's and O's  - low sodium diet, fluid restriction 1.5L/day  - very guarded prognosis, has frequent readmissions  - pt has lifevest at home, considering AICD    CAD s/p PCI  - continue aspirin, plavix, statin, metoprolol    HTN  - controlled    CVA  - c/w ASA/Plavix/statin    COPD on home O2 - on nebs, symbicort    CORNELIUS on CPAP qhs    DM II  - fs controlled    DVT PPX:  Lovenox      Progress Note Handoff  Pending (specify): Cont IV diuresis, heart failure and EP f/u, labs in AM  pt informed of the plan of care  Disposition: home with services

## 2022-03-26 LAB
ALBUMIN SERPL ELPH-MCNC: 3.3 G/DL — LOW (ref 3.5–5.2)
ALP SERPL-CCNC: 124 U/L — HIGH (ref 30–115)
ALT FLD-CCNC: 6 U/L — SIGNIFICANT CHANGE UP (ref 0–41)
ANION GAP SERPL CALC-SCNC: 18 MMOL/L — HIGH (ref 7–14)
AST SERPL-CCNC: 15 U/L — SIGNIFICANT CHANGE UP (ref 0–41)
BASOPHILS # BLD AUTO: 0.04 K/UL — SIGNIFICANT CHANGE UP (ref 0–0.2)
BASOPHILS NFR BLD AUTO: 1 % — SIGNIFICANT CHANGE UP (ref 0–1)
BILIRUB SERPL-MCNC: 1.2 MG/DL — SIGNIFICANT CHANGE UP (ref 0.2–1.2)
BUN SERPL-MCNC: 18 MG/DL — SIGNIFICANT CHANGE UP (ref 10–20)
CALCIUM SERPL-MCNC: 9.1 MG/DL — SIGNIFICANT CHANGE UP (ref 8.5–10.1)
CHLORIDE SERPL-SCNC: 88 MMOL/L — LOW (ref 98–110)
CO2 SERPL-SCNC: 32 MMOL/L — SIGNIFICANT CHANGE UP (ref 17–32)
CREAT SERPL-MCNC: 1.1 MG/DL — SIGNIFICANT CHANGE UP (ref 0.7–1.5)
EGFR: 59 ML/MIN/1.73M2 — LOW
EOSINOPHIL # BLD AUTO: 0.08 K/UL — SIGNIFICANT CHANGE UP (ref 0–0.7)
EOSINOPHIL NFR BLD AUTO: 1.9 % — SIGNIFICANT CHANGE UP (ref 0–8)
GLUCOSE BLDC GLUCOMTR-MCNC: 119 MG/DL — HIGH (ref 70–99)
GLUCOSE BLDC GLUCOMTR-MCNC: 128 MG/DL — HIGH (ref 70–99)
GLUCOSE BLDC GLUCOMTR-MCNC: 131 MG/DL — HIGH (ref 70–99)
GLUCOSE BLDC GLUCOMTR-MCNC: 139 MG/DL — HIGH (ref 70–99)
GLUCOSE SERPL-MCNC: 153 MG/DL — HIGH (ref 70–99)
HCT VFR BLD CALC: 40.6 % — SIGNIFICANT CHANGE UP (ref 37–47)
HGB BLD-MCNC: 13 G/DL — SIGNIFICANT CHANGE UP (ref 12–16)
IMM GRANULOCYTES NFR BLD AUTO: 0.2 % — SIGNIFICANT CHANGE UP (ref 0.1–0.3)
LYMPHOCYTES # BLD AUTO: 1.05 K/UL — LOW (ref 1.2–3.4)
LYMPHOCYTES # BLD AUTO: 25 % — SIGNIFICANT CHANGE UP (ref 20.5–51.1)
MAGNESIUM SERPL-MCNC: 1.8 MG/DL — SIGNIFICANT CHANGE UP (ref 1.8–2.4)
MCHC RBC-ENTMCNC: 29.5 PG — SIGNIFICANT CHANGE UP (ref 27–31)
MCHC RBC-ENTMCNC: 32 G/DL — SIGNIFICANT CHANGE UP (ref 32–37)
MCV RBC AUTO: 92.3 FL — SIGNIFICANT CHANGE UP (ref 81–99)
MONOCYTES # BLD AUTO: 0.32 K/UL — SIGNIFICANT CHANGE UP (ref 0.1–0.6)
MONOCYTES NFR BLD AUTO: 7.6 % — SIGNIFICANT CHANGE UP (ref 1.7–9.3)
NEUTROPHILS # BLD AUTO: 2.7 K/UL — SIGNIFICANT CHANGE UP (ref 1.4–6.5)
NEUTROPHILS NFR BLD AUTO: 64.3 % — SIGNIFICANT CHANGE UP (ref 42.2–75.2)
NRBC # BLD: 0 /100 WBCS — SIGNIFICANT CHANGE UP (ref 0–0)
PLATELET # BLD AUTO: 224 K/UL — SIGNIFICANT CHANGE UP (ref 130–400)
POTASSIUM SERPL-MCNC: 3.7 MMOL/L — SIGNIFICANT CHANGE UP (ref 3.5–5)
POTASSIUM SERPL-SCNC: 3.7 MMOL/L — SIGNIFICANT CHANGE UP (ref 3.5–5)
PROT SERPL-MCNC: 6.6 G/DL — SIGNIFICANT CHANGE UP (ref 6–8)
RBC # BLD: 4.4 M/UL — SIGNIFICANT CHANGE UP (ref 4.2–5.4)
RBC # FLD: 17.9 % — HIGH (ref 11.5–14.5)
SODIUM SERPL-SCNC: 138 MMOL/L — SIGNIFICANT CHANGE UP (ref 135–146)
WBC # BLD: 4.2 K/UL — LOW (ref 4.8–10.8)
WBC # FLD AUTO: 4.2 K/UL — LOW (ref 4.8–10.8)

## 2022-03-26 PROCEDURE — 93010 ELECTROCARDIOGRAM REPORT: CPT

## 2022-03-26 PROCEDURE — 99233 SBSQ HOSP IP/OBS HIGH 50: CPT

## 2022-03-26 RX ORDER — MAGNESIUM SULFATE 500 MG/ML
2 VIAL (ML) INJECTION ONCE
Refills: 0 | Status: COMPLETED | OUTPATIENT
Start: 2022-03-26 | End: 2022-03-26

## 2022-03-26 RX ADMIN — Medication 50 MILLIGRAM(S): at 06:08

## 2022-03-26 RX ADMIN — SACUBITRIL AND VALSARTAN 1 TABLET(S): 24; 26 TABLET, FILM COATED ORAL at 06:08

## 2022-03-26 RX ADMIN — CLOPIDOGREL BISULFATE 75 MILLIGRAM(S): 75 TABLET, FILM COATED ORAL at 11:39

## 2022-03-26 RX ADMIN — Medication 81 MILLIGRAM(S): at 11:40

## 2022-03-26 RX ADMIN — Medication 145 MILLIGRAM(S): at 11:39

## 2022-03-26 RX ADMIN — Medication 25 GRAM(S): at 11:40

## 2022-03-26 RX ADMIN — ATORVASTATIN CALCIUM 80 MILLIGRAM(S): 80 TABLET, FILM COATED ORAL at 21:37

## 2022-03-26 RX ADMIN — SPIRONOLACTONE 50 MILLIGRAM(S): 25 TABLET, FILM COATED ORAL at 06:08

## 2022-03-26 RX ADMIN — QUETIAPINE FUMARATE 100 MILLIGRAM(S): 200 TABLET, FILM COATED ORAL at 21:37

## 2022-03-26 RX ADMIN — Medication 2 GRAM(S): at 17:59

## 2022-03-26 RX ADMIN — BUMETANIDE 5 MG/HR: 0.25 INJECTION INTRAMUSCULAR; INTRAVENOUS at 07:48

## 2022-03-26 RX ADMIN — PANTOPRAZOLE SODIUM 40 MILLIGRAM(S): 20 TABLET, DELAYED RELEASE ORAL at 06:09

## 2022-03-26 RX ADMIN — ENOXAPARIN SODIUM 40 MILLIGRAM(S): 100 INJECTION SUBCUTANEOUS at 06:08

## 2022-03-26 RX ADMIN — BUDESONIDE AND FORMOTEROL FUMARATE DIHYDRATE 2 PUFF(S): 160; 4.5 AEROSOL RESPIRATORY (INHALATION) at 21:36

## 2022-03-26 RX ADMIN — BUDESONIDE AND FORMOTEROL FUMARATE DIHYDRATE 2 PUFF(S): 160; 4.5 AEROSOL RESPIRATORY (INHALATION) at 07:48

## 2022-03-26 RX ADMIN — Medication 2 GRAM(S): at 06:09

## 2022-03-26 NOTE — PROGRESS NOTE ADULT - SUBJECTIVE AND OBJECTIVE BOX
CHIEF COMPLAINT:    Patient is a 57y old  Female who presents with a chief complaint of CHF     INTERVAL HPI/OVERNIGHT EVENTS:    Patient seen and examined at bedside. No acute overnight events occurred.    ROS: Denies All other systems are negative.    Vital Signs:    T(F): 97.5 (22 @ 11:30), Max: 97.5 (22 @ 11:30)  HR: 82 (22 @ 11:30) (82 - 94)  BP: 85/50 (22 @ 11:30) (85/50 - 113/76)  RR: 18 (22 @ 07:30) (18 - 18)  SpO2: 96% (22 @ 07:30) (96% - 96%)  I&O's Summary    25 Mar 2022 07:01  -  26 Mar 2022 07:00  --------------------------------------------------------  IN: 2083 mL / OUT: 7100 mL / NET: -5017 mL    26 Mar 2022 07:01  -  26 Mar 2022 14:40  --------------------------------------------------------  IN: 300 mL / OUT: 1700 mL / NET: -1400 mL      Daily     Daily Weight in k.5 (26 Mar 2022 05:30)  CAPILLARY BLOOD GLUCOSE      POCT Blood Glucose.: 139 mg/dL (26 Mar 2022 11:40)  POCT Blood Glucose.: 119 mg/dL (26 Mar 2022 07:38)  POCT Blood Glucose.: 136 mg/dL (25 Mar 2022 21:14)  POCT Blood Glucose.: 129 mg/dL (25 Mar 2022 16:37)      PHYSICAL EXAM:  GENERAL:  NAD  SKIN: No rashes or lesions  HEENT: Atraumatic. Normocephalic. Anicteric  NECK:  No JVD.   PULMONARY: Clear to ausculation bilaterally. No wheezing. No rales  CVS: Normal S1, S2. Regular rate and rhythm. No murmurs.  ABDOMEN/GI: Soft, Nontender, Nondistended; Bowel sounds are present  EXTREMITIES:  No edema B/L LE.  NEUROLOGIC:  No motor deficit.  PSYCH: Alert & oriented x 3, normal affect    Consultant(s) Notes Reviewed:  [x ] YES  [ ] NO  Care Discussed with Consultants/Other Providers [ x] YES  [ ] NO    LABS:                        13.0   4.20  )-----------( 224      ( 26 Mar 2022 04:30 )             40.6         138  |  88<L>  |  18  ----------------------------<  153<H>  3.7   |  32  |  1.1    Ca    9.1      26 Mar 2022 04:30  Mg     1.8         TPro  6.6  /  Alb  3.3<L>  /  TBili  1.2  /  DBili  x   /  AST  15  /  ALT  6   /  AlkPhos  124<H>        Serum Pro-Brain Natriuretic Peptide: 3452 pg/mL (22 @ 22:07)          RADIOLOGY & ADDITIONAL TESTS:  Imaging or report Personally Reviewed:  [ ] YES  [ ] NO    Telemetry reviewed independently - VTACh 16 seconds    Medications:  Standing  aspirin enteric coated 81 milliGRAM(s) Oral daily  atorvastatin 80 milliGRAM(s) Oral at bedtime  budesonide 160 MICROgram(s)/formoterol 4.5 MICROgram(s) Inhaler 2 Puff(s) Inhalation two times a day  buMETAnide Infusion 1 mG/Hr IV Continuous <Continuous>  clopidogrel Tablet 75 milliGRAM(s) Oral daily  enoxaparin Injectable 40 milliGRAM(s) SubCutaneous every 24 hours  fenofibrate Tablet 145 milliGRAM(s) Oral daily  influenza   Vaccine 0.5 milliLiter(s) IntraMuscular once  insulin lispro (ADMELOG) corrective regimen sliding scale   SubCutaneous three times a day before meals  metoprolol succinate ER 50 milliGRAM(s) Oral daily  omega-3-Acid Ethyl Esters 2 Gram(s) Oral two times a day  pantoprazole    Tablet 40 milliGRAM(s) Oral before breakfast  QUEtiapine 100 milliGRAM(s) Oral at bedtime  spironolactone 50 milliGRAM(s) Oral daily    PRN Meds  acetaminophen     Tablet .. 650 milliGRAM(s) Oral every 6 hours PRN  ALBUTerol    90 MICROgram(s) HFA Inhaler 2 Puff(s) Inhalation every 6 hours PRN  albuterol/ipratropium for Nebulization 3 milliLiter(s) Nebulizer every 6 hours PRN  melatonin 3 milliGRAM(s) Oral at bedtime PRN      Case discussed with resident  Care discussed with pt         CHIEF COMPLAINT:    Patient is a 57y old  Female who presents with a chief complaint of CHF     INTERVAL HPI/OVERNIGHT EVENTS:    Patient seen and examined at bedside. No acute overnight events occurred.    ROS: Denies SOB, chest pain, lightheadedness. All other systems are negative.    Vital Signs:    T(F): 97.5 (22 @ 11:30), Max: 97.5 (22 @ 11:30)  HR: 82 (22 @ 11:30) (82 - 94)  BP: 85/50 (22 @ 11:30) (85/50 - 113/76)  RR: 18 (22 @ 07:30) (18 - 18)  SpO2: 96% (22 @ 07:30) (96% - 96%)  I&O's Summary    25 Mar 2022 07:01  -  26 Mar 2022 07:00  --------------------------------------------------------  IN: 2083 mL / OUT: 7100 mL / NET: -5017 mL    26 Mar 2022 07:01  -  26 Mar 2022 14:40  --------------------------------------------------------  IN: 300 mL / OUT: 1700 mL / NET: -1400 mL      Daily     Daily Weight in k.5 (26 Mar 2022 05:30)  CAPILLARY BLOOD GLUCOSE      POCT Blood Glucose.: 139 mg/dL (26 Mar 2022 11:40)  POCT Blood Glucose.: 119 mg/dL (26 Mar 2022 07:38)  POCT Blood Glucose.: 136 mg/dL (25 Mar 2022 21:14)  POCT Blood Glucose.: 129 mg/dL (25 Mar 2022 16:37)      PHYSICAL EXAM:  GENERAL:  NAD  SKIN: No rashes or lesions  HEENT: Atraumatic. Normocephalic. Anicteric  NECK:  No JVD.   PULMONARY: Clear to ausculation bilaterally. No wheezing. No rales  CVS: Normal S1, S2. Regular rate and rhythm. No murmurs.  ABDOMEN/GI: Soft, Nontender, Nondistended; Bowel sounds are present  EXTREMITIES:  1+ edema  NEUROLOGIC:  No motor deficit.  PSYCH: Alert & oriented x 3, normal affect    Consultant(s) Notes Reviewed:  [x ] YES  [ ] NO  Care Discussed with Consultants/Other Providers [ x] YES  [ ] NO    LABS:                        13.0   4.20  )-----------( 224      ( 26 Mar 2022 04:30 )             40.6         138  |  88<L>  |  18  ----------------------------<  153<H>  3.7   |  32  |  1.1    Ca    9.1      26 Mar 2022 04:30  Mg     1.8         TPro  6.6  /  Alb  3.3<L>  /  TBili  1.2  /  DBili  x   /  AST  15  /  ALT  6   /  AlkPhos  124<H>        Serum Pro-Brain Natriuretic Peptide: 3452 pg/mL (22 @ 22:07)          RADIOLOGY & ADDITIONAL TESTS:  Imaging or report Personally Reviewed:  [ ] YES  [ ] NO    Telemetry reviewed independently - VTACh 16 seconds    Medications:  Standing  aspirin enteric coated 81 milliGRAM(s) Oral daily  atorvastatin 80 milliGRAM(s) Oral at bedtime  budesonide 160 MICROgram(s)/formoterol 4.5 MICROgram(s) Inhaler 2 Puff(s) Inhalation two times a day  buMETAnide Infusion 1 mG/Hr IV Continuous <Continuous>  clopidogrel Tablet 75 milliGRAM(s) Oral daily  enoxaparin Injectable 40 milliGRAM(s) SubCutaneous every 24 hours  fenofibrate Tablet 145 milliGRAM(s) Oral daily  influenza   Vaccine 0.5 milliLiter(s) IntraMuscular once  insulin lispro (ADMELOG) corrective regimen sliding scale   SubCutaneous three times a day before meals  metoprolol succinate ER 50 milliGRAM(s) Oral daily  omega-3-Acid Ethyl Esters 2 Gram(s) Oral two times a day  pantoprazole    Tablet 40 milliGRAM(s) Oral before breakfast  QUEtiapine 100 milliGRAM(s) Oral at bedtime  spironolactone 50 milliGRAM(s) Oral daily    PRN Meds  acetaminophen     Tablet .. 650 milliGRAM(s) Oral every 6 hours PRN  ALBUTerol    90 MICROgram(s) HFA Inhaler 2 Puff(s) Inhalation every 6 hours PRN  albuterol/ipratropium for Nebulization 3 milliLiter(s) Nebulizer every 6 hours PRN  melatonin 3 milliGRAM(s) Oral at bedtime PRN      Case discussed with resident  Care discussed with pt

## 2022-03-26 NOTE — PROGRESS NOTE ADULT - ASSESSMENT
56 yo F PMHx  COPD on 3L home O2, CORNELIUS on CPAP, HFrEF (19% on TTE 02/2022, life vest at home), pulmonary HTN, CAD s/p PCI to RCA, HTN, HLD, CVA with residual Left LE weakness, DM, recently quit smoking (2 months ago) presented to the ED for worsening bilateral LE swelling.     Acute on Chronic HFrEF  - ECHO 3/23/22: EF improved at 30-35%, mod MR and TR  - heart failure f/u appreciated  - continue bumex at 1mg/hr  - continue toprol, Entresto, spironolactone  - monitor renal function and electrolytes  - daily wts, I's and O's  - low sodium diet, fluid restriction 1.5L/day  - very guarded prognosis, has frequent readmissions    CAD s/p PCI  - continue aspirin, plavix, statin, metoprolol    HTN  - controlled    CVA  - c/w ASA/Plavix/statin    COPD on home O2 - on nebs, symbicort    CORNELIUS on CPAP qhs    DM   - fs controlled    DVT PPX:  Lovenox      Progress Note Handoff  Pending (specify): Cont IV diuresis, heart failure and EP f/u, labs in AM  pt informed of the plan of care  Disposition: home with services     58 yo F PMHx  COPD on 3L home O2, CORNELIUS on CPAP, HFrEF (19% on TTE 02/2022, life vest at home), pulmonary HTN, CAD s/p PCI to RCA, HTN, HLD, CVA with residual Left LE weakness, DM, recently quit smoking (2 months ago) presented to the ED for worsening bilateral LE swelling.     Acute on Chronic HFrEF  - ECHO 3/23/22: EF improved at 30-35%, mod MR and TR  - heart failure f/u appreciated  - case d/w CCU team--hold bumex and Entresto given hypotension. No CCU upgrade now for hypotension as pt asymptomatic. Hypotension may be med related and not cardiogenic in nature  - monitor renal function and electrolytes  - daily wts, I's and O's  - low sodium diet, fluid restriction 1.5L/day  - very guarded prognosis, has frequent readmissions    VTACH  - spoke with EP--no plan for AICD as she has been referred many times in past and has not gone to her appointments  - c/w telemetry for now  - has lifevest at home    CAD s/p PCI  - continue aspirin, plavix, statin, metoprolol    HTN  - controlled    CVA  - c/w ASA/Plavix/statin    COPD on home O2 - on nebs, symbicort    CORNELIUS on CPAP qhs    DM   - fs controlled    DVT PPX:  Lovenox      Progress Note Handoff  Pending (specify): Monitor BP  pt informed of the plan of care  Disposition: home with services

## 2022-03-26 NOTE — PROGRESS NOTE ADULT - ASSESSMENT
Acute on chronic systolic HF / Fluid overload / pulmonary HTN / COPD / DM2 / Anxiety   ----------------------------------------------------  ECHO EF 30-35%,   Telemetry NSVT     Get BMP twice daily   Maintain potassium >4.0, Mg >2.1  Strict intake and output  Daily weight   hold diuretic for today and start with torsemide 20 BID starting tomorrow  hold Entresto today and consider restarting tomorrow when BP improves with systolic above 100  Continue metoprolol XL 50 mg daily   Continue Spironolactone 50mg daily   Physical therapy / Incentive spirometer   Will continue to follow    Discussed with Dr. Ge over the phone

## 2022-03-26 NOTE — PHYSICAL THERAPY INITIAL EVALUATION ADULT - GENERAL OBSERVATIONS, REHAB EVAL
14:08-14:30 22 min Chart reviewed, pt seen for PT IE, pt does not want PT, pt reported that she has not amb since 2014, she has a 24 hr HHA who transfers her in and out of bed to w/c or shower, PT educated pt on supine UE/LE therex to perform daily, pt verbalized understanding, will d/c PT, re-consult should status change, RN informed

## 2022-03-26 NOTE — PROGRESS NOTE ADULT - SUBJECTIVE AND OBJECTIVE BOX
CHIEF COMPLAINT:  Patient is a 57y old  Female who presents with a chief complaint of CHF (25 Mar 2022 13:43)      INTERVAL HISTORY/OVERNIGHT EVENTS:  Patient seen and examined   patient has episode of NSVT overnight   patient BP with systolic in the 90s  patient states she feels better  currently requiring 4-5 of o2 via NC  NET: -5017 mL    ======================  MEDICATIONS:  aspirin enteric coated 81 milliGRAM(s) Oral daily  atorvastatin 80 milliGRAM(s) Oral at bedtime  budesonide 160 MICROgram(s)/formoterol 4.5 MICROgram(s) Inhaler 2 Puff(s) Inhalation two times a day  clopidogrel Tablet 75 milliGRAM(s) Oral daily  enoxaparin Injectable 40 milliGRAM(s) SubCutaneous every 24 hours  fenofibrate Tablet 145 milliGRAM(s) Oral daily  influenza   Vaccine 0.5 milliLiter(s) IntraMuscular once  insulin lispro (ADMELOG) corrective regimen sliding scale   SubCutaneous three times a day before meals  metoprolol succinate ER 50 milliGRAM(s) Oral daily  omega-3-Acid Ethyl Esters 2 Gram(s) Oral two times a day  pantoprazole    Tablet 40 milliGRAM(s) Oral before breakfast  QUEtiapine 100 milliGRAM(s) Oral at bedtime  spironolactone 50 milliGRAM(s) Oral daily        ======================  OBJECTIVE:  T(F): 98.3 (03-26 @ 14:51), Max: 98.3 (03-26 @ 14:51)  HR: 74 (03-26 @ 14:51) (74 - 94)  BP: 89/53 (03-26 @ 14:51) (85/50 - 113/76)  RR: 14 (03-26 @ 14:51) (14 - 18)  SpO2: 96% (03-26 @ 14:51) (96% - 96%)    I/O:      03-23 @ 07:01  -  03-24 @ 07:00  --------------------------------------------------------  IN: 2031 mL / OUT: 4100 mL / NET: -2069 mL    03-24 @ 07:01 - 03-25 @ 07:00  --------------------------------------------------------  IN: 1998 mL / OUT: 4900 mL / NET: -2902 mL    03-25 @ 07:01 - 03-26 @ 07:00  --------------------------------------------------------  IN: 2083 mL / OUT: 7100 mL / NET: -5017 mL    03-26 @ 07:01 - 03-26 @ 16:39  --------------------------------------------------------  IN: 840 mL / OUT: 2450 mL / NET: -1610 mL        Weight trend:        PHYSICAL EXAMINATION:  NEURO: patient is awake , alert and oriented  GEN: Not in acute distress  NECK: no thyroid enlargement, no JVD  LUNGS: decrease b/l breath sounds  CARDIOVASCULAR: S1/S2 present, RRR , no murmurs or rubs, no carotid bruits,  + PP bilaterally  ABD: Soft, non-tender, non-distended, +BS  EXT: mild LE pitting edema   SKIN: Intact    ======================    LABS:                          13.0   4.20  )-----------( 224      ( 26 Mar 2022 04:30 )             40.6     03-26    138  |  88<L>  |  18  ----------------------------<  153<H>  3.7   |  32  |  1.1    Ca    9.1      26 Mar 2022 04:30  Mg     1.8     03-26    TPro  6.6  /  Alb  3.3<L>  /  TBili  1.2  /  DBili  x   /  AST  15  /  ALT  6   /  AlkPhos  124<H>  03-26    LIVER FUNCTIONS - ( 26 Mar 2022 04:30 )  Alb: 3.3 g/dL / Pro: 6.6 g/dL / ALK PHOS: 124 U/L / ALT: 6 U/L / AST: 15 U/L / GGT: x                   Serum Pro-Brain Natriuretic Peptide: 3452 pg/mL (03-21)        < from: 12 Lead ECG (03.22.22 @ 10:24) >  Normal sinus rhythm  Possible Left atrial enlargement  Non-specific intra-ventricular conduction block  Lateral infarct , age undetermined  Abnormal ECG    < end of copied text >                < from: TTE Echo Complete w/o Contrast w/ Doppler (03.23.22 @ 11:09) >  Summary:   1. Left ventricular ejection fraction, by visual estimation, is 30 to   35%.   2. Mildly enlarged left atrium.   3. Mildly enlarged right atrium.   4. Mild mitral annular calcification.   5. Moderate mitral valve regurgitation.   6. Moderate tricuspid regurgitation.   7. Mild aortic regurgitation.   8. Sclerotic aortic valve with normal opening.    < end of copied text >

## 2022-03-27 LAB
ALBUMIN SERPL ELPH-MCNC: 3.3 G/DL — LOW (ref 3.5–5.2)
ALP SERPL-CCNC: 116 U/L — HIGH (ref 30–115)
ALT FLD-CCNC: 7 U/L — SIGNIFICANT CHANGE UP (ref 0–41)
ANION GAP SERPL CALC-SCNC: 14 MMOL/L — SIGNIFICANT CHANGE UP (ref 7–14)
ANION GAP SERPL CALC-SCNC: 15 MMOL/L — HIGH (ref 7–14)
AST SERPL-CCNC: 16 U/L — SIGNIFICANT CHANGE UP (ref 0–41)
BASOPHILS # BLD AUTO: 0.06 K/UL — SIGNIFICANT CHANGE UP (ref 0–0.2)
BASOPHILS NFR BLD AUTO: 1.4 % — HIGH (ref 0–1)
BILIRUB SERPL-MCNC: 1.1 MG/DL — SIGNIFICANT CHANGE UP (ref 0.2–1.2)
BUN SERPL-MCNC: 22 MG/DL — HIGH (ref 10–20)
BUN SERPL-MCNC: 24 MG/DL — HIGH (ref 10–20)
CALCIUM SERPL-MCNC: 9 MG/DL — SIGNIFICANT CHANGE UP (ref 8.5–10.1)
CALCIUM SERPL-MCNC: 9 MG/DL — SIGNIFICANT CHANGE UP (ref 8.5–10.1)
CHLORIDE SERPL-SCNC: 93 MMOL/L — LOW (ref 98–110)
CHLORIDE SERPL-SCNC: 95 MMOL/L — LOW (ref 98–110)
CO2 SERPL-SCNC: 32 MMOL/L — SIGNIFICANT CHANGE UP (ref 17–32)
CO2 SERPL-SCNC: 34 MMOL/L — HIGH (ref 17–32)
CREAT SERPL-MCNC: 1 MG/DL — SIGNIFICANT CHANGE UP (ref 0.7–1.5)
CREAT SERPL-MCNC: 1.1 MG/DL — SIGNIFICANT CHANGE UP (ref 0.7–1.5)
EGFR: 59 ML/MIN/1.73M2 — LOW
EGFR: 67 ML/MIN/1.73M2 — SIGNIFICANT CHANGE UP
EOSINOPHIL # BLD AUTO: 0.09 K/UL — SIGNIFICANT CHANGE UP (ref 0–0.7)
EOSINOPHIL NFR BLD AUTO: 2 % — SIGNIFICANT CHANGE UP (ref 0–8)
GLUCOSE BLDC GLUCOMTR-MCNC: 109 MG/DL — HIGH (ref 70–99)
GLUCOSE BLDC GLUCOMTR-MCNC: 113 MG/DL — HIGH (ref 70–99)
GLUCOSE BLDC GLUCOMTR-MCNC: 141 MG/DL — HIGH (ref 70–99)
GLUCOSE BLDC GLUCOMTR-MCNC: 149 MG/DL — HIGH (ref 70–99)
GLUCOSE SERPL-MCNC: 130 MG/DL — HIGH (ref 70–99)
GLUCOSE SERPL-MCNC: 137 MG/DL — HIGH (ref 70–99)
HCT VFR BLD CALC: 42.2 % — SIGNIFICANT CHANGE UP (ref 37–47)
HGB BLD-MCNC: 13.3 G/DL — SIGNIFICANT CHANGE UP (ref 12–16)
IMM GRANULOCYTES NFR BLD AUTO: 0.5 % — HIGH (ref 0.1–0.3)
LACTATE SERPL-SCNC: 1.2 MMOL/L — SIGNIFICANT CHANGE UP (ref 0.7–2)
LYMPHOCYTES # BLD AUTO: 1.15 K/UL — LOW (ref 1.2–3.4)
LYMPHOCYTES # BLD AUTO: 25.9 % — SIGNIFICANT CHANGE UP (ref 20.5–51.1)
MAGNESIUM SERPL-MCNC: 1.6 MG/DL — LOW (ref 1.8–2.4)
MAGNESIUM SERPL-MCNC: 1.7 MG/DL — LOW (ref 1.8–2.4)
MCHC RBC-ENTMCNC: 29.4 PG — SIGNIFICANT CHANGE UP (ref 27–31)
MCHC RBC-ENTMCNC: 31.5 G/DL — LOW (ref 32–37)
MCV RBC AUTO: 93.2 FL — SIGNIFICANT CHANGE UP (ref 81–99)
MONOCYTES # BLD AUTO: 0.33 K/UL — SIGNIFICANT CHANGE UP (ref 0.1–0.6)
MONOCYTES NFR BLD AUTO: 7.4 % — SIGNIFICANT CHANGE UP (ref 1.7–9.3)
NEUTROPHILS # BLD AUTO: 2.79 K/UL — SIGNIFICANT CHANGE UP (ref 1.4–6.5)
NEUTROPHILS NFR BLD AUTO: 62.8 % — SIGNIFICANT CHANGE UP (ref 42.2–75.2)
NRBC # BLD: 0 /100 WBCS — SIGNIFICANT CHANGE UP (ref 0–0)
PLATELET # BLD AUTO: 242 K/UL — SIGNIFICANT CHANGE UP (ref 130–400)
POTASSIUM SERPL-MCNC: 3.8 MMOL/L — SIGNIFICANT CHANGE UP (ref 3.5–5)
POTASSIUM SERPL-MCNC: 4.4 MMOL/L — SIGNIFICANT CHANGE UP (ref 3.5–5)
POTASSIUM SERPL-SCNC: 3.8 MMOL/L — SIGNIFICANT CHANGE UP (ref 3.5–5)
POTASSIUM SERPL-SCNC: 4.4 MMOL/L — SIGNIFICANT CHANGE UP (ref 3.5–5)
PROT SERPL-MCNC: 6.2 G/DL — SIGNIFICANT CHANGE UP (ref 6–8)
RBC # BLD: 4.53 M/UL — SIGNIFICANT CHANGE UP (ref 4.2–5.4)
RBC # FLD: 17.5 % — HIGH (ref 11.5–14.5)
SODIUM SERPL-SCNC: 140 MMOL/L — SIGNIFICANT CHANGE UP (ref 135–146)
SODIUM SERPL-SCNC: 143 MMOL/L — SIGNIFICANT CHANGE UP (ref 135–146)
WBC # BLD: 4.44 K/UL — LOW (ref 4.8–10.8)
WBC # FLD AUTO: 4.44 K/UL — LOW (ref 4.8–10.8)

## 2022-03-27 PROCEDURE — 99233 SBSQ HOSP IP/OBS HIGH 50: CPT

## 2022-03-27 RX ORDER — MAGNESIUM SULFATE 500 MG/ML
2 VIAL (ML) INJECTION ONCE
Refills: 0 | Status: COMPLETED | OUTPATIENT
Start: 2022-03-27 | End: 2022-03-27

## 2022-03-27 RX ADMIN — ATORVASTATIN CALCIUM 80 MILLIGRAM(S): 80 TABLET, FILM COATED ORAL at 21:35

## 2022-03-27 RX ADMIN — Medication 2 GRAM(S): at 05:50

## 2022-03-27 RX ADMIN — BUDESONIDE AND FORMOTEROL FUMARATE DIHYDRATE 2 PUFF(S): 160; 4.5 AEROSOL RESPIRATORY (INHALATION) at 21:34

## 2022-03-27 RX ADMIN — ENOXAPARIN SODIUM 40 MILLIGRAM(S): 100 INJECTION SUBCUTANEOUS at 05:49

## 2022-03-27 RX ADMIN — Medication 81 MILLIGRAM(S): at 11:25

## 2022-03-27 RX ADMIN — PANTOPRAZOLE SODIUM 40 MILLIGRAM(S): 20 TABLET, DELAYED RELEASE ORAL at 05:49

## 2022-03-27 RX ADMIN — CLOPIDOGREL BISULFATE 75 MILLIGRAM(S): 75 TABLET, FILM COATED ORAL at 11:25

## 2022-03-27 RX ADMIN — BUDESONIDE AND FORMOTEROL FUMARATE DIHYDRATE 2 PUFF(S): 160; 4.5 AEROSOL RESPIRATORY (INHALATION) at 07:52

## 2022-03-27 RX ADMIN — Medication 50 MILLIGRAM(S): at 07:52

## 2022-03-27 RX ADMIN — Medication 145 MILLIGRAM(S): at 11:26

## 2022-03-27 RX ADMIN — Medication 20 MILLIGRAM(S): at 07:51

## 2022-03-27 RX ADMIN — QUETIAPINE FUMARATE 100 MILLIGRAM(S): 200 TABLET, FILM COATED ORAL at 21:35

## 2022-03-27 RX ADMIN — SPIRONOLACTONE 50 MILLIGRAM(S): 25 TABLET, FILM COATED ORAL at 07:51

## 2022-03-27 RX ADMIN — Medication 2 GRAM(S): at 18:39

## 2022-03-27 RX ADMIN — Medication 25 GRAM(S): at 19:04

## 2022-03-27 NOTE — PROGRESS NOTE ADULT - ASSESSMENT
58 yo F PMHx  COPD on 3L home O2, CORNELIUS on CPAP, HFrEF (19% on TTE 02/2022, life vest at home), pulmonary HTN, CAD s/p PCI to RCA, HTN, HLD, CVA with residual Left LE weakness, DM, recently quit smoking (2 months ago) presented to the ED for worsening bilateral LE swelling.     Acute on Chronic HFrEF  Frequent NSVT  - ECHO 3/23/22: EF improved at 30-35%, mod MR and TR  - heart failure f/u appreciated  - case d/w CCU team--hold bumex and Entresto given hypotension. No CCU upgrade now for hypotension as pt asymptomatic. Hypotension may be med related and not cardiogenic in nature  - monitor renal function and electrolytes  - daily wts, I's and O's  - low sodium diet, fluid restriction 1.5L/day  - very guarded prognosis, has frequent readmissions  - case d/w EP--will plan for AICD on this admission    Hypotension  - improved with holding meds  - hold torsemide, spironolactone, entresto  - case d/w cardio yesterday-likely med related  - if bp decreases again will give small amount of fluids    CAD s/p PCI  - continue aspirin, plavix, statin, metoprolol    HTN  - controlled    CVA  - c/w ASA/Plavix/statin    COPD on home O2 - on nebs, Symbicort    CORNELIUS on CPAP qhs    DM   - fs controlled    DVT PPX:  Lovenox      Progress Note Handoff  Pending (specify): Monitor BP  pt informed of the plan of care  Disposition: home with services

## 2022-03-27 NOTE — PROGRESS NOTE ADULT - SUBJECTIVE AND OBJECTIVE BOX
CHIEF COMPLAINT:    Patient is a 57y old  Female who presents with a chief complaint of CHF    INTERVAL HPI/OVERNIGHT EVENTS:    Patient seen and examined at bedside. No acute overnight events occurred.    ROS: Denies dizziness, chest pain. All other systems are negative.    Vital Signs:    T(F): 96.2 (22 @ 11:30), Max: 98.3 (22 @ 14:51)  HR: 95 (22 @ 12:54) (74 - 95)  BP: 102/67 (22 @ 12:54) (82/50 - 104/70)  RR: 12 (22 @ 07:30) (12 - 18)  SpO2: 95% (22 @ 07:30) (95% - 97%)  I&O's Summary    26 Mar 2022 07:01  -  27 Mar 2022 07:00  --------------------------------------------------------  IN: 1140 mL / OUT: 4150 mL / NET: -3010 mL    27 Mar 2022 07:01  -  27 Mar 2022 14:05  --------------------------------------------------------  IN: 360 mL / OUT: 0 mL / NET: 360 mL      Daily     Daily Weight in k.2 (27 Mar 2022 04:48)  CAPILLARY BLOOD GLUCOSE      POCT Blood Glucose.: 113 mg/dL (27 Mar 2022 11:23)  POCT Blood Glucose.: 109 mg/dL (27 Mar 2022 07:32)  POCT Blood Glucose.: 128 mg/dL (26 Mar 2022 21:16)  POCT Blood Glucose.: 131 mg/dL (26 Mar 2022 16:35)      PHYSICAL EXAM:  GENERAL:  NAD  SKIN: No rashes or lesions  HEENT: Atraumatic. Normocephalic. Anicteric  NECK:  No JVD.   PULMONARY: Clear to ausculation bilaterally. No wheezing. No rales  CVS: Normal S1, S2. Regular rate and rhythm. No murmurs.  ABDOMEN/GI: Soft, Nontender, Nondistended; Bowel sounds are present  EXTREMITIES:  No edema B/L LE.  NEUROLOGIC:  No motor deficit.  PSYCH: Alert & oriented x 3, normal affect    Consultant(s) Notes Reviewed:  [x ] YES  [ ] NO  Care Discussed with Consultants/Other Providers [ x] YES  [ ] NO    LABS:                        13.3   4.44  )-----------( 242      ( 27 Mar 2022 04:30 )             42.2         143  |  95<L>  |  22<H>  ----------------------------<  137<H>  3.8   |  34<H>  |  1.0    Ca    9.0      27 Mar 2022 04:30  Mg     1.7         TPro  6.2  /  Alb  3.3<L>  /  TBili  1.1  /  DBili  x   /  AST  16  /  ALT  7   /  AlkPhos  116<H>        Serum Pro-Brain Natriuretic Peptide: 3452 pg/mL (22 @ 22:07)          RADIOLOGY & ADDITIONAL TESTS:  Imaging or report Personally Reviewed:  [ ] YES  [ ] NO    Telemetry reviewed independently - no acute events    Medications:  Standing  aspirin enteric coated 81 milliGRAM(s) Oral daily  atorvastatin 80 milliGRAM(s) Oral at bedtime  budesonide 160 MICROgram(s)/formoterol 4.5 MICROgram(s) Inhaler 2 Puff(s) Inhalation two times a day  clopidogrel Tablet 75 milliGRAM(s) Oral daily  enoxaparin Injectable 40 milliGRAM(s) SubCutaneous every 24 hours  fenofibrate Tablet 145 milliGRAM(s) Oral daily  influenza   Vaccine 0.5 milliLiter(s) IntraMuscular once  insulin lispro (ADMELOG) corrective regimen sliding scale   SubCutaneous three times a day before meals  metoprolol succinate ER 50 milliGRAM(s) Oral daily  omega-3-Acid Ethyl Esters 2 Gram(s) Oral two times a day  pantoprazole    Tablet 40 milliGRAM(s) Oral before breakfast  QUEtiapine 100 milliGRAM(s) Oral at bedtime    PRN Meds  acetaminophen     Tablet .. 650 milliGRAM(s) Oral every 6 hours PRN  ALBUTerol    90 MICROgram(s) HFA Inhaler 2 Puff(s) Inhalation every 6 hours PRN  albuterol/ipratropium for Nebulization 3 milliLiter(s) Nebulizer every 6 hours PRN  melatonin 3 milliGRAM(s) Oral at bedtime PRN      Case discussed with resident  Care discussed with pt

## 2022-03-27 NOTE — PROGRESS NOTE ADULT - SUBJECTIVE AND OBJECTIVE BOX
WILLIAN MARY 57y Female  MRN#: [id number]   CODE STATUS:________        SUBJECTIVE    No acute events overnight.                                            OBJECTIVE  PAST MEDICAL & SURGICAL HISTORY  Hypertension    Hyperlipidemia    Anxiety and depression    COPD, severe    CHF (congestive heart failure)    Cerebrovascular accident (CVA)  Multiple    Type 2 diabetes mellitus    CKD (chronic kidney disease), stage II    No significant past surgical history                                              -----------------------------------------------------------  ALLERGIES:  No Known Allergies                                            ------------------------------------------------------------    HOME MEDICATIONS  Home Medications:  atorvastatin 80 mg oral tablet: 1 tab(s) orally once a day (at bedtime) (16 Feb 2022 13:50)  fenofibrate 145 mg oral tablet: 1 tab(s) orally once a day (16 Feb 2022 13:50)  magnesium oxide 400 mg oral tablet: 1 tab(s) orally 3 times a day (with meals) (14 Jan 2022 12:37)  pantoprazole 40 mg oral delayed release tablet: 1 tab(s) orally once a day (before a meal) (16 Nov 2021 12:10)                           MEDICATIONS:  STANDING MEDICATIONS  aspirin enteric coated 81 milliGRAM(s) Oral daily  atorvastatin 80 milliGRAM(s) Oral at bedtime  budesonide 160 MICROgram(s)/formoterol 4.5 MICROgram(s) Inhaler 2 Puff(s) Inhalation two times a day  clopidogrel Tablet 75 milliGRAM(s) Oral daily  enoxaparin Injectable 40 milliGRAM(s) SubCutaneous every 24 hours  fenofibrate Tablet 145 milliGRAM(s) Oral daily  influenza   Vaccine 0.5 milliLiter(s) IntraMuscular once  insulin lispro (ADMELOG) corrective regimen sliding scale   SubCutaneous three times a day before meals  metoprolol succinate ER 50 milliGRAM(s) Oral daily  omega-3-Acid Ethyl Esters 2 Gram(s) Oral two times a day  pantoprazole    Tablet 40 milliGRAM(s) Oral before breakfast  QUEtiapine 100 milliGRAM(s) Oral at bedtime  spironolactone 50 milliGRAM(s) Oral daily  torsemide 20 milliGRAM(s) Oral two times a day    PRN MEDICATIONS  acetaminophen     Tablet .. 650 milliGRAM(s) Oral every 6 hours PRN  ALBUTerol    90 MICROgram(s) HFA Inhaler 2 Puff(s) Inhalation every 6 hours PRN  albuterol/ipratropium for Nebulization 3 milliLiter(s) Nebulizer every 6 hours PRN  melatonin 3 milliGRAM(s) Oral at bedtime PRN                                            ------------------------------------------------------------  VITAL SIGNS: Last 24 Hours  T(C): 36.8 (27 Mar 2022 04:48), Max: 36.8 (26 Mar 2022 14:51)  T(F): 98.2 (27 Mar 2022 04:48), Max: 98.3 (26 Mar 2022 14:51)  HR: 92 (27 Mar 2022 06:43) (74 - 94)  BP: 88/51 (27 Mar 2022 06:43) (84/57 - 90/62)  BP(mean): 67 (26 Mar 2022 14:51) (63 - 67)  RR: 18 (27 Mar 2022 04:48) (14 - 18)  SpO2: 97% (27 Mar 2022 04:48) (95% - 97%)      03-26-22 @ 07:01  -  03-27-22 @ 07:00  --------------------------------------------------------  IN: 1140 mL / OUT: 4150 mL / NET: -3010 mL                                             --------------------------------------------------------------  LABS:                        13.0   4.20  )-----------( 224      ( 26 Mar 2022 04:30 )             40.6     03-26    138  |  88<L>  |  18  ----------------------------<  153<H>  3.7   |  32  |  1.1    Ca    9.1      26 Mar 2022 04:30  Mg     1.8     03-26    TPro  6.6  /  Alb  3.3<L>  /  TBili  1.2  /  DBili  x   /  AST  15  /  ALT  6   /  AlkPhos  124<H>  03-26                        PHYSICAL EXAM:    General: NAD  LUNGS: crackles at lung bases  HEART: RRR, distant heart sounds  ABDOMEN: soft, nontender  EXT: 2+ pitting edema up to the knees, improved from previous  NEURO: AAOx3, CN 2-12 intact                                             --------------------------------------------------------------    ASSESSMENT & PLAN    57 year old female with PMH of COPD on 3L home O2, CORNELIUS on CPAP, HFrEF (19% on TTE 02/2022, life vest at home), pulmonary HTN, CAD s/p PCI to RCA, HTN, HLD, CVA with residual LLE weakness, DM, recently quit smoking (2 months ago) presented to the ED for worsening bilateral LE swelling.     # bilateral LE edema likely secondary to acute on chronic HFrEF exacerbation  # H/O pulmonary HTN   # H/O PAD with bilateral SFA occlusion  - Echo 02/06/2022: 19% EF, small pericardial effusion, moderate p-HTN, mod-severe MR  - Daily weight. Strict I & Os. Keep I < O. Fluid restriction 1L  - pro-BNP on admission 3425, around baseline.   - CXR performed on admission appears to show cardiomegaly with increased vascular congestion, Follow up official read   - Troponin negative on admission, repeat in AM  - No ischemic changes on EKG  - Continue metoprolol 50mg PO daily, spironolactone 50mg PO daily, entresto 97-103mg PO BID   - continue with aspirin 81mg PO daily, plavix 75mg PO daily, and atorvastatin 80mg PO daily.   - Monitor electrolytes, keep K>4 and Mg >2  - patient left life vest at home, will keep on telemetry for now  - Received Lasix 60 IV for one night  - HF evaluated patient, said to give 2mg Bumex push and start on Bumex 1mg/hr drip  - Pt still on Bumex drip on 3/25, will discuss with HF about titrating down  - Patient's edema significantly improved over 24 hours  - Both legs now improved  - EP consulted for AICD, pt says will discuss with family   - AICD on hold until patient euvolemic in any case   - Monitor UOP closely with daily weights   - Given 3 doses of mag on 3/25  - Per HF team bumex was held on 3/26 and patient started on Torsemide 20 BID from 3/27        # COPD on 3L home O2 - doubt active exacerbation  # CORNELIUS on CPAP   - now on 3L NC, at baseline  - Keep SpO2 88-92%.   - continue with symbicort  - duonebs PRN   - Incentive spirometry  - continue with CPAP at night     # H/O CAD s/p PCI to RCA  # H/O CVA with residual L sided weakness   # HTN/ HLD   - continue with aspirin 81mg PO daily, Plavix 75mg PO daily, Metoprolol succinate 50mg PO daily, and fenofibrate 145mg PO daily.   - Continue with entresto 97-103mg PO BID but can hold if systolic <100      # DM    - monitor FS, if consistently >180, start insulin protocol    # GERD   - continue with protonix     # DVT Prophylaxis: Lovenox  # Diet: DASH/TLC, CC, fluid restriction  # GI Prophylaxis: pantoprazole   # Activity: IAT  # Code Status: Full Code  # Dispo: acute

## 2022-03-28 LAB
ALBUMIN SERPL ELPH-MCNC: 3.4 G/DL — LOW (ref 3.5–5.2)
ALP SERPL-CCNC: 100 U/L — SIGNIFICANT CHANGE UP (ref 30–115)
ALT FLD-CCNC: 8 U/L — SIGNIFICANT CHANGE UP (ref 0–41)
ANION GAP SERPL CALC-SCNC: 13 MMOL/L — SIGNIFICANT CHANGE UP (ref 7–14)
ANISOCYTOSIS BLD QL: SLIGHT — SIGNIFICANT CHANGE UP
AST SERPL-CCNC: 19 U/L — SIGNIFICANT CHANGE UP (ref 0–41)
BASOPHILS # BLD AUTO: 0.04 K/UL — SIGNIFICANT CHANGE UP (ref 0–0.2)
BASOPHILS NFR BLD AUTO: 0.9 % — SIGNIFICANT CHANGE UP (ref 0–1)
BILIRUB SERPL-MCNC: 1.1 MG/DL — SIGNIFICANT CHANGE UP (ref 0.2–1.2)
BUN SERPL-MCNC: 26 MG/DL — HIGH (ref 10–20)
CALCIUM SERPL-MCNC: 8.9 MG/DL — SIGNIFICANT CHANGE UP (ref 8.5–10.1)
CHLORIDE SERPL-SCNC: 94 MMOL/L — LOW (ref 98–110)
CO2 SERPL-SCNC: 33 MMOL/L — HIGH (ref 17–32)
CREAT SERPL-MCNC: 1.1 MG/DL — SIGNIFICANT CHANGE UP (ref 0.7–1.5)
EGFR: 59 ML/MIN/1.73M2 — LOW
EOSINOPHIL # BLD AUTO: 0.08 K/UL — SIGNIFICANT CHANGE UP (ref 0–0.7)
EOSINOPHIL NFR BLD AUTO: 1.7 % — SIGNIFICANT CHANGE UP (ref 0–8)
GIANT PLATELETS BLD QL SMEAR: PRESENT — SIGNIFICANT CHANGE UP
GLUCOSE BLDC GLUCOMTR-MCNC: 108 MG/DL — HIGH (ref 70–99)
GLUCOSE BLDC GLUCOMTR-MCNC: 143 MG/DL — HIGH (ref 70–99)
GLUCOSE BLDC GLUCOMTR-MCNC: 143 MG/DL — HIGH (ref 70–99)
GLUCOSE BLDC GLUCOMTR-MCNC: 153 MG/DL — HIGH (ref 70–99)
GLUCOSE SERPL-MCNC: 165 MG/DL — HIGH (ref 70–99)
HCT VFR BLD CALC: 40.2 % — SIGNIFICANT CHANGE UP (ref 37–47)
HGB BLD-MCNC: 12.8 G/DL — SIGNIFICANT CHANGE UP (ref 12–16)
HYPOCHROMIA BLD QL: SLIGHT — SIGNIFICANT CHANGE UP
LYMPHOCYTES # BLD AUTO: 1.19 K/UL — LOW (ref 1.2–3.4)
LYMPHOCYTES # BLD AUTO: 26.1 % — SIGNIFICANT CHANGE UP (ref 20.5–51.1)
MACROCYTES BLD QL: SLIGHT — SIGNIFICANT CHANGE UP
MAGNESIUM SERPL-MCNC: 1.9 MG/DL — SIGNIFICANT CHANGE UP (ref 1.8–2.4)
MANUAL SMEAR VERIFICATION: SIGNIFICANT CHANGE UP
MCHC RBC-ENTMCNC: 29.4 PG — SIGNIFICANT CHANGE UP (ref 27–31)
MCHC RBC-ENTMCNC: 31.8 G/DL — LOW (ref 32–37)
MCV RBC AUTO: 92.2 FL — SIGNIFICANT CHANGE UP (ref 81–99)
MONOCYTES # BLD AUTO: 0.12 K/UL — SIGNIFICANT CHANGE UP (ref 0.1–0.6)
MONOCYTES NFR BLD AUTO: 2.6 % — SIGNIFICANT CHANGE UP (ref 1.7–9.3)
NEUTROPHILS # BLD AUTO: 3.01 K/UL — SIGNIFICANT CHANGE UP (ref 1.4–6.5)
NEUTROPHILS NFR BLD AUTO: 66.1 % — SIGNIFICANT CHANGE UP (ref 42.2–75.2)
PLAT MORPH BLD: ABNORMAL
PLATELET # BLD AUTO: 216 K/UL — SIGNIFICANT CHANGE UP (ref 130–400)
POLYCHROMASIA BLD QL SMEAR: SLIGHT — SIGNIFICANT CHANGE UP
POTASSIUM SERPL-MCNC: 3.9 MMOL/L — SIGNIFICANT CHANGE UP (ref 3.5–5)
POTASSIUM SERPL-SCNC: 3.9 MMOL/L — SIGNIFICANT CHANGE UP (ref 3.5–5)
PROT SERPL-MCNC: 6.4 G/DL — SIGNIFICANT CHANGE UP (ref 6–8)
RBC # BLD: 4.36 M/UL — SIGNIFICANT CHANGE UP (ref 4.2–5.4)
RBC # FLD: 17.2 % — HIGH (ref 11.5–14.5)
RBC BLD AUTO: ABNORMAL
SARS-COV-2 RNA SPEC QL NAA+PROBE: SIGNIFICANT CHANGE UP
SODIUM SERPL-SCNC: 140 MMOL/L — SIGNIFICANT CHANGE UP (ref 135–146)
VARIANT LYMPHS # BLD: 2.6 % — SIGNIFICANT CHANGE UP (ref 0–5)
WBC # BLD: 4.56 K/UL — LOW (ref 4.8–10.8)
WBC # FLD AUTO: 4.56 K/UL — LOW (ref 4.8–10.8)

## 2022-03-28 PROCEDURE — 71045 X-RAY EXAM CHEST 1 VIEW: CPT | Mod: 26

## 2022-03-28 PROCEDURE — 99233 SBSQ HOSP IP/OBS HIGH 50: CPT

## 2022-03-28 RX ORDER — POTASSIUM CHLORIDE 20 MEQ
40 PACKET (EA) ORAL ONCE
Refills: 0 | Status: COMPLETED | OUTPATIENT
Start: 2022-03-28 | End: 2022-03-28

## 2022-03-28 RX ORDER — MAGNESIUM SULFATE 500 MG/ML
2 VIAL (ML) INJECTION ONCE
Refills: 0 | Status: COMPLETED | OUTPATIENT
Start: 2022-03-28 | End: 2022-03-28

## 2022-03-28 RX ADMIN — ENOXAPARIN SODIUM 40 MILLIGRAM(S): 100 INJECTION SUBCUTANEOUS at 05:24

## 2022-03-28 RX ADMIN — Medication 2 GRAM(S): at 18:43

## 2022-03-28 RX ADMIN — BUDESONIDE AND FORMOTEROL FUMARATE DIHYDRATE 2 PUFF(S): 160; 4.5 AEROSOL RESPIRATORY (INHALATION) at 20:52

## 2022-03-28 RX ADMIN — Medication 145 MILLIGRAM(S): at 12:11

## 2022-03-28 RX ADMIN — CLOPIDOGREL BISULFATE 75 MILLIGRAM(S): 75 TABLET, FILM COATED ORAL at 12:11

## 2022-03-28 RX ADMIN — Medication 2 GRAM(S): at 05:24

## 2022-03-28 RX ADMIN — Medication 40 MILLIEQUIVALENT(S): at 17:02

## 2022-03-28 RX ADMIN — Medication 20 MILLIGRAM(S): at 17:03

## 2022-03-28 RX ADMIN — Medication 81 MILLIGRAM(S): at 12:11

## 2022-03-28 RX ADMIN — ATORVASTATIN CALCIUM 80 MILLIGRAM(S): 80 TABLET, FILM COATED ORAL at 21:28

## 2022-03-28 RX ADMIN — Medication 1: at 07:57

## 2022-03-28 RX ADMIN — Medication 25 GRAM(S): at 17:02

## 2022-03-28 RX ADMIN — PANTOPRAZOLE SODIUM 40 MILLIGRAM(S): 20 TABLET, DELAYED RELEASE ORAL at 07:12

## 2022-03-28 RX ADMIN — Medication 50 MILLIGRAM(S): at 05:24

## 2022-03-28 RX ADMIN — QUETIAPINE FUMARATE 100 MILLIGRAM(S): 200 TABLET, FILM COATED ORAL at 21:28

## 2022-03-28 RX ADMIN — BUDESONIDE AND FORMOTEROL FUMARATE DIHYDRATE 2 PUFF(S): 160; 4.5 AEROSOL RESPIRATORY (INHALATION) at 07:57

## 2022-03-28 NOTE — PROGRESS NOTE ADULT - ASSESSMENT
Acute on chronic systolic HF / Fluid overload / pulmonary HTN / COPD / DM2 / Anxiety     Patient is clinically improving but remains fluid overloaded- POCUS exam: IVC 2.5cm, non collapsing   Agree with Torsemide 20mg BID for now  Given hypotension- can continue to hold spironolactone and entresto for now, consider restarting at lower dose once BP stabilizes   Obtain procal today 3/28 to assess for infectious cause of hypotension   Continue metoprolol XL 50 mg daily   Get BMP daily   Mag 1.9, K 3.9- replete   Maintain potassium >4.0, Mg >2.1  Strict intake and output  Daily weight   Physical therapy / Incentive spirometer  AICD implantation as per EP team   Plan discussed with primary team  Will continue to follow   Acute on chronic systolic HF / Fluid overload / pulmonary HTN / COPD / DM2 / Anxiety     Patient is clinically improving but remains fluid overloaded- POCUS exam: IVC 2.5cm, non collapsing   Agree with Torsemide 20mg BID for now  Given hypotension- can continue to hold spironolactone and entresto for now, consider restarting at lower dose once BP stabilizes   Obtain procal today 3/28 to assess for infectious cause of hypotension   Continue metoprolol XL 50 mg daily   Get BMP daily   Mag 1.9, K 3.9- replete   Maintain potassium >4.0, Mg >2.1  Strict intake and output  Daily weight   Physical therapy / Incentive spirometer  AICD implantation as per EP team   Plan discussed with primary team  Will continue to follow.

## 2022-03-28 NOTE — PROGRESS NOTE ADULT - ASSESSMENT
58 yo F PMHx COPD on 3L home O2, CONRELIUS on CPAP, HFrEF (19% on TTE 02/2022, life vest at home), pulmonary HTN, CAD s/p PCI to RCA, HTN, HLD, CVA with residual Left LE weakness, DM, recently quit smoking (2 months ago) presented to the ED for worsening bilateral LE swelling.     Acute on Chronic HFrEF  Frequent NSVT  - ECHO 3/23/22: EF improved at 30-35%, mod MR and TR  - monitor renal function and electrolytes  - daily wts, I's and O's  - low sodium diet, fluid restriction 1.5L/day  - very guarded prognosis, has frequent readmissions now with hypotension  - case d/w EP--will plan for AICD on this admission  - entersto, spironolactone held due to hypotension  - pt clinically appears euvolemic, will see if pt can tolerate torsemide due to BP  - awaiting HF follow up    Hypotension  - improved with holding meds  - hold torsemide, spironolactone, entresto  - if bp decreases again will give small amount of fluids  - monitor for cardiogenic shock    CAD s/p PCI  - continue aspirin, plavix, statin, metoprolol    HTN  - controlled    CVA  - c/w ASA/Plavix/statin    COPD on home O2 - on nebs, Symbicort    CORNELIUS on CPAP qhs    DM   - fs controlled    DVT PPX:  Lovenox      Progress Note Handoff  Pending (specify): Monitor BP  pt informed of the plan of care  Disposition: home with services

## 2022-03-28 NOTE — PROGRESS NOTE ADULT - SUBJECTIVE AND OBJECTIVE BOX
CHIEF COMPLAINT:    Patient is a 57y old  Female who presents with a chief complaint of CHF (28 Mar 2022 12:22)      INTERVAL HPI/OVERNIGHT EVENTS:    Patient seen and examined at bedside. No acute overnight events occurred.    ROS: All other systems are negative.    Vital Signs:    T(F): 97.5 (22 @ 13:12), Max: 97.5 (22 @ 13:12)  HR: 55 (22 @ 13:12) (55 - 94)  BP: 103/62 (22 @ 13:12) (99/63 - 108/66)  RR: 18 (22 @ 13:12) (18 - 18)  SpO2: 97% (22 @ 04:39) (96% - 98%)  I&O's Summary    27 Mar 2022 07:01  -  28 Mar 2022 07:00  --------------------------------------------------------  IN: 1220 mL / OUT: 1550 mL / NET: -330 mL    28 Mar 2022 07:01  -  28 Mar 2022 13:18  --------------------------------------------------------  IN: 800 mL / OUT: 0 mL / NET: 800 mL      Daily     Daily Weight in k.2 (28 Mar 2022 04:39)  CAPILLARY BLOOD GLUCOSE      POCT Blood Glucose.: 108 mg/dL (28 Mar 2022 11:18)  POCT Blood Glucose.: 153 mg/dL (28 Mar 2022 07:24)  POCT Blood Glucose.: 149 mg/dL (27 Mar 2022 22:05)  POCT Blood Glucose.: 141 mg/dL (27 Mar 2022 16:40)      PHYSICAL EXAM:  GENERAL:  NAD  SKIN: No rashes or lesions  HEENT: Atraumatic. Normocephalic. Anicteric  NECK:  No JVD.   PULMONARY: Clear to ausculation bilaterally. No wheezing. No rales  CVS: Normal S1, S2. Regular rate and rhythm. No murmurs.  ABDOMEN/GI: Soft, Nontender, Nondistended; Bowel sounds are present  EXTREMITIES:  No edema B/L LE.  NEUROLOGIC:  No motor deficit.  PSYCH: Alert & oriented x 3, normal affect    Consultant(s) Notes Reviewed:  [x ] YES  [ ] NO  Care Discussed with Consultants/Other Providers [ x] YES  [ ] NO    LABS:                        12.8   4.56  )-----------( 216      ( 28 Mar 2022 06:30 )             40.2         140  |  94<L>  |  26<H>  ----------------------------<  165<H>  3.9   |  33<H>  |  1.1    Ca    8.9      28 Mar 2022 06:30  Mg     1.9         TPro  6.4  /  Alb  3.4<L>  /  TBili  1.1  /  DBili  x   /  AST  19  /  ALT  8   /  AlkPhos  100        Serum Pro-Brain Natriuretic Peptide: 3452 pg/mL (22 @ 22:07)          RADIOLOGY & ADDITIONAL TESTS:  Imaging or report Personally Reviewed:  [ ] YES  [ ] NO    Telemetry reviewed independently - no events    Medications:  Standing  aspirin enteric coated 81 milliGRAM(s) Oral daily  atorvastatin 80 milliGRAM(s) Oral at bedtime  budesonide 160 MICROgram(s)/formoterol 4.5 MICROgram(s) Inhaler 2 Puff(s) Inhalation two times a day  clopidogrel Tablet 75 milliGRAM(s) Oral daily  enoxaparin Injectable 40 milliGRAM(s) SubCutaneous every 24 hours  fenofibrate Tablet 145 milliGRAM(s) Oral daily  influenza   Vaccine 0.5 milliLiter(s) IntraMuscular once  insulin lispro (ADMELOG) corrective regimen sliding scale   SubCutaneous three times a day before meals  metoprolol succinate ER 50 milliGRAM(s) Oral daily  omega-3-Acid Ethyl Esters 2 Gram(s) Oral two times a day  pantoprazole    Tablet 40 milliGRAM(s) Oral before breakfast  QUEtiapine 100 milliGRAM(s) Oral at bedtime  torsemide 20 milliGRAM(s) Oral every 12 hours    PRN Meds  acetaminophen     Tablet .. 650 milliGRAM(s) Oral every 6 hours PRN  ALBUTerol    90 MICROgram(s) HFA Inhaler 2 Puff(s) Inhalation every 6 hours PRN  albuterol/ipratropium for Nebulization 3 milliLiter(s) Nebulizer every 6 hours PRN  melatonin 3 milliGRAM(s) Oral at bedtime PRN      Case discussed with resident  Care discussed with pt

## 2022-03-28 NOTE — PROGRESS NOTE ADULT - SUBJECTIVE AND OBJECTIVE BOX
WILLIAN MARY 57y Female  MRN#: [id number]   CODE STATUS:________        SUBJECTIVE    No acute events overnight. The patient was off diuretics yesterday. All BP meds held yesterday and this morning due to hypotension.                                             ----------------------------------------------------------  OBJECTIVE  PAST MEDICAL & SURGICAL HISTORY  Hypertension    Hyperlipidemia    Anxiety and depression    COPD, severe    CHF (congestive heart failure)    Cerebrovascular accident (CVA)  Multiple    Type 2 diabetes mellitus    CKD (chronic kidney disease), stage II    No significant past surgical history                                              -----------------------------------------------------------  ALLERGIES:  No Known Allergies                                            ------------------------------------------------------------    HOME MEDICATIONS  Home Medications:  atorvastatin 80 mg oral tablet: 1 tab(s) orally once a day (at bedtime) (16 Feb 2022 13:50)  fenofibrate 145 mg oral tablet: 1 tab(s) orally once a day (16 Feb 2022 13:50)  magnesium oxide 400 mg oral tablet: 1 tab(s) orally 3 times a day (with meals) (14 Jan 2022 12:37)  pantoprazole 40 mg oral delayed release tablet: 1 tab(s) orally once a day (before a meal) (16 Nov 2021 12:10)                           MEDICATIONS:  STANDING MEDICATIONS  aspirin enteric coated 81 milliGRAM(s) Oral daily  atorvastatin 80 milliGRAM(s) Oral at bedtime  budesonide 160 MICROgram(s)/formoterol 4.5 MICROgram(s) Inhaler 2 Puff(s) Inhalation two times a day  clopidogrel Tablet 75 milliGRAM(s) Oral daily  enoxaparin Injectable 40 milliGRAM(s) SubCutaneous every 24 hours  fenofibrate Tablet 145 milliGRAM(s) Oral daily  influenza   Vaccine 0.5 milliLiter(s) IntraMuscular once  insulin lispro (ADMELOG) corrective regimen sliding scale   SubCutaneous three times a day before meals  metoprolol succinate ER 50 milliGRAM(s) Oral daily  omega-3-Acid Ethyl Esters 2 Gram(s) Oral two times a day  pantoprazole    Tablet 40 milliGRAM(s) Oral before breakfast  QUEtiapine 100 milliGRAM(s) Oral at bedtime  torsemide 20 milliGRAM(s) Oral every 12 hours    PRN MEDICATIONS  acetaminophen     Tablet .. 650 milliGRAM(s) Oral every 6 hours PRN  ALBUTerol    90 MICROgram(s) HFA Inhaler 2 Puff(s) Inhalation every 6 hours PRN  albuterol/ipratropium for Nebulization 3 milliLiter(s) Nebulizer every 6 hours PRN  melatonin 3 milliGRAM(s) Oral at bedtime PRN                                            ------------------------------------------------------------  VITAL SIGNS: Last 24 Hours  T(C): 36.1 (28 Mar 2022 04:39), Max: 36.1 (27 Mar 2022 17:00)  T(F): 97 (28 Mar 2022 04:39), Max: 97 (27 Mar 2022 17:00)  HR: 77 (28 Mar 2022 04:39) (72 - 94)  BP: 108/66 (28 Mar 2022 04:39) (99/63 - 108/66)  BP(mean): --  RR: 18 (28 Mar 2022 04:39) (18 - 18)  SpO2: 97% (28 Mar 2022 04:39) (96% - 98%)      03-27-22 @ 07:01  -  03-28-22 @ 07:00  --------------------------------------------------------  IN: 1220 mL / OUT: 1550 mL / NET: -330 mL    03-28-22 @ 07:01  -  03-28-22 @ 12:59  --------------------------------------------------------  IN: 500 mL / OUT: 0 mL / NET: 500 mL                                             --------------------------------------------------------------  LABS:                        12.8   4.56  )-----------( 216      ( 28 Mar 2022 06:30 )             40.2     03-28    140  |  94<L>  |  26<H>  ----------------------------<  165<H>  3.9   |  33<H>  |  1.1    Ca    8.9      28 Mar 2022 06:30  Mg     1.9     03-28    TPro  6.4  /  Alb  3.4<L>  /  TBili  1.1  /  DBili  x   /  AST  19  /  ALT  8   /  AlkPhos  100  03-28          PHYSICAL EXAM:    General: NAD  LUNGS: crackles at lung bases  HEART: RRR, distant heart sounds  ABDOMEN: soft, nontender  EXT: 1+ pitting edema up to the knees, improved from previous  NEURO: AAOx3, CN 2-12 intact                                             --------------------------------------------------------------    ASSESSMENT & PLAN    57 year old female with PMH of COPD on 3L home O2, CORNELIUS on CPAP, HFrEF (19% on TTE 02/2022, life vest at home), pulmonary HTN, CAD s/p PCI to RCA, HTN, HLD, CVA with residual LLE weakness, DM, recently quit smoking (2 months ago) presented to the ED for worsening bilateral LE swelling.     # bilateral LE edema likely secondary to acute on chronic HFrEF exacerbation  # H/O pulmonary HTN   # H/O PAD with bilateral SFA occlusion  - Echo 02/06/2022: 19% EF, small pericardial effusion, moderate p-HTN, mod-severe MR  - Daily weight. Strict I & Os. Keep I < O. Fluid restriction 1L  - pro-BNP on admission 3425, around baseline.   - CXR performed on admission appears to show cardiomegaly with increased vascular congestion, Follow up official read   - Troponin negative on admission, repeat in AM  - No ischemic changes on EKG  - Continue metoprolol 50mg PO daily, spironolactone 50mg PO daily, entresto 97-103mg PO BID   - continue with aspirin 81mg PO daily, plavix 75mg PO daily, and atorvastatin 80mg PO daily.   - Monitor electrolytes, keep K>4 and Mg >2  - patient left life vest at home, will keep on telemetry for now  - Received Lasix 60 IV for one night  - HF evaluated patient, said to give 2mg Bumex push and start on Bumex 1mg/hr drip  - Patient kept on Bumex drip until 3/26  - Patient's edema significantly improved over 24 hours  - Both legs now improved  - EP consulted for AICD, pt says will discuss with family   - AICD on hold until patient euvolemic in any case   - Monitor UOP closely with daily weights   - Given 3 doses of mag on 3/25  - Per HF team bumex was held on 3/26 and patient started on Torsemide 20 BID from 3/27  - Patient became hypotensive on 3/27, diuretics and BP meds held (Only toprol continued)  - Will restart Torsemide 20 BID in the evening of 3/28        # COPD on 3L home O2 - doubt active exacerbation  # CORNELIUS on CPAP   - now on 3L NC, at baseline  - Keep SpO2 88-92%.   - continue with symbicort  - duonebs PRN   - Incentive spirometry  - continue with CPAP at night     # H/O CAD s/p PCI to RCA  # H/O CVA with residual L sided weakness   # HTN/ HLD   - continue with aspirin 81mg PO daily, Plavix 75mg PO daily, Metoprolol succinate 50mg PO daily, and fenofibrate 145mg PO daily.   - Continue with entresto 97-103mg PO BID but can hold if systolic <100  - Patient had an episode of hypotension on 3/27, held all BP meds  - Will slowly resume BP meds if systolic pressure holds  - Get procal for hypotension      # DM    - monitor FS, if consistently >180, start insulin protocol    # GERD   - continue with protonix     # DVT Prophylaxis: Lovenox  # Diet: DASH/TLC, CC, fluid restriction  # GI Prophylaxis: pantoprazole   # Activity: IAT  # Code Status: Full Code  # Dispo: acute

## 2022-03-28 NOTE — PROGRESS NOTE ADULT - SUBJECTIVE AND OBJECTIVE BOX
Date of Admission: 3/21/22    Interval History:    - Patient assessed resting in bed, on 3L NC (home oxygenation requirements), NAD  - Patient noted to be hypotensive over the weekend- diuretics held    Chief Complaint: Patient is a 57y old  Female who presents with a chief complaint of B/L edema    HISTORY OF PRESENT ILLNESS: 57 year old female with PMH of COPD on 3L home O2, CORNELIUS on CPAP, HFrEF (19% on TTE 2022, life vest at home), pulmonary HTN, CAD s/p PCI to RCA, HTN, HLD, CVA with residual LLE weakness, DM, recently quit smoking (2 months ago) presented to the ED for worsening bilateral LE swelling. Patient had noted that for the past week prior to admission, her legs began to have progressively worsening swelling and are now tender. The day of admission, patient was in the bath and complained to the HHA who called 911, so she arrived in the ED without her life vest. Patient was discharged from the hospital on  after being diuresed for acute on chronic HFrEF exacerbation. She states that she has been compliant with her diet and medications, denies any NSAID use. She was not able to follow up with any physicians since her last discharge. Otherwise denies any fevers, chills, chest pain, cough, SOB, or urinary symptoms.   In the ED, HR was 104, vitals otherwise stable. Lab work was remarkable for pro-BNP 3452. Given Lasix 40mg IV in the ED and admitted to medicine for further work up and management.  (22 Mar 2022 00:01)      PAST MEDICAL & SURGICAL HISTORY:  Hypertension  Hyperlipidemia  Anxiety and depression  COPD, severe  CHF (congestive heart failure)  Cerebrovascular accident (CVA)Multiple  Type 2 diabetes mellitus  CKD (chronic kidney disease), stage II  No significant past surgical history        FAMILY HISTORY:  No pertinent family history of premature cardiovascular disease in first degree relatives.  Mother:  of cancer at 65  Father:  of an overdose at 50    SOCIAL HISTORY: Former smoker- states she quit since her last admission a month ago. Denies alcohol or drug use.       Allergies  No Known Allergies    Intolerances    PHYSICAL EXAM:  General Appearance: well appearing, normal for age and gender. 	  Neck: unable to assess due to body habitus  Cardiovascular: regular rate and rhythm S1 S2, , No murmurs,+1 B/L edema  Respiratory: posterior lung fields clear  Psychiatry: Alert and oriented x 3, Mood & affect appropriate  Gastrointestinal:  Soft, Non-tender  Skin/Integumentary : No rashes, No ecchymoses, No cyanosis	  Neurologic: Non-focal  Musculoskeletal/ extremities: Normal range of motion, No clubbing, cyanosis   Vascular: Peripheral pulses palpable 2+ bilaterally    CARDIAC MARKERS:  Serum Pro-Brain Natriuretic Peptide: 4892 pg/mL (22 @ 10:50)      TELEMETRY EVENTS: 	    ECG:  	3/21/22      Ventricular Rate 103 BPM  Atrial Rate 103 BPM  P-R Interval 172 ms  QRS Duration 152 ms  Q-T Interval 390 ms  QTC Calculation(Bazett) 510 ms  P Axis 91 degrees  R Axis 196 degrees  T Axis 33 degrees    Diagnosis Line *** Suspect armlead reversal, interpretation assumes no reversal  Sinus tachycardia  Possible Left atrial enlargement  Non-specific intra-ventricular conduction block  Lateral infarct , age undetermined  Abnormal ECG    Confirmed by CARLOTA GAUTHIER MD ([id number]) on 3/22/2022 9:01:55 AM          PREVIOUS DIAGNOSTIC TESTING:      TTE 22    Summary:   1. LV Ejection Fraction by Urena's Method with a biplane EF of 19 %.   2. Severely decreased global left ventricular systolic function.   3. Dilated RV with moderately reduced systolic function.   4. Moderately enlarged left atrium.   5. Severely enlarged right atrium.   6. Sclerotic aorticvalve with normal opening.   7. Mild aortic regurgitation.   8. The mitral valve leaflets are tethered due to reduced systolic   function and elevated LVDP.   9. Moderate-to-severe mitral regurgitation.  10. Severe tricuspid regurgitation.  11. Estimated pulmonary artery systolic pressure is 53.7 mmHg assuming a   right atrial pressure of 15 mmHg, which is consistent with moderate   pulmonary hypertension.  12. Small pericardial effusion adjacent to the right atrium.        TTE 21    Summary:   1. Left ventricular ejection fraction, by visual estimation, is 35 to 40%.   2.Moderately decreased global left ventricular systolic function.   3. Multiple left ventricular regional wall motion abnormalities exist. See wall motion findings.   4. Elevated left atrial and left ventricular end-diastolic pressures.   5. Mild concentric left ventricular hypertrophy.   6. Mildly increased LV wall thickness.   7. Normal left ventricular internal cavity size.   8. Spectral Doppler shows restrictive pattern of left ventricular myocardial filling (Grade III diastolic dysfunction).  9. Moderately reduced RV systolic function.  10. Mildly enlarged left atrium.  11. Moderately enlarged right atrium.  12. Small pericardial effusion.  13. Mild to moderate mitral valve regurgitation.  14. Moderate-severe tricuspid regurgitation.  15.Mild aortic regurgitation.  16. Sclerotic aortic valve with normal opening.  17. Estimated pulmonary artery systolic pressure is 50.0 mmHg assuming a right atrial pressure of 15 mmHg, which is consistent with moderate pulmonary hypertension.  18. Pulmonary hypertension is present.  19. LA volume Index is 36.7 ml/m² ml/m2.    	    Home Medications:  Albuterol (Eqv-ProAir HFA) 90 mcg/inh inhalation aerosol: 2 puff(s) inhaled every 6 hours, As Needed (2021 11:22)  aspirin 81 mg oral tablet: 1 tab(s) orally once a day (2021 11:22)  fenofibrate 145 mg oral tablet: 1 tab(s) orally once a day (2021 11:22)  glipiZIDE 10 mg oral tablet: 1 tab(s) orally 2 times a day (2021 11:22)  Lovaza 1000 mg oral capsule: 2 cap(s) orally 2 times a day (2021 14:52)  metFORMIN 1000 mg oral tablet: 1 tab(s) orally 2 times a day Do not take until  (2021 12:58)  Plavix 75 mg oral tablet: 1 tab(s) orally once a day (2021 11:22)  Vitamin D2 1.25 mg (50,000 intl units) oral capsule: 1 cap(s) orally once a week (2021 14:52)    MEDICATIONS  (STANDING):  aspirin  chewable 81 milliGRAM(s) Oral daily  atorvastatin 80 milliGRAM(s) Oral at bedtime  budesonide 160 MICROgram(s)/formoterol 4.5 MICROgram(s) Inhaler 2 Puff(s) Inhalation two times a day  chlorhexidine 4% Liquid 1 Application(s) Topical <User Schedule>  clopidogrel Tablet 75 milliGRAM(s) Oral daily  enoxaparin Injectable 40 milliGRAM(s) SubCutaneous daily  fenofibrate Tablet 145 milliGRAM(s) Oral daily  furosemide   Injectable 40 milliGRAM(s) IV Push two times a day  magnesium sulfate  IVPB 2 Gram(s) IV Intermittent once  metoprolol succinate ER 50 milliGRAM(s) Oral daily  omega-3-Acid Ethyl Esters 2 Gram(s) Oral two times a day  pantoprazole    Tablet 40 milliGRAM(s) Oral before breakfast  sacubitril 49 mG/valsartan 51 mG 1 Tablet(s) Oral two times a day    MEDICATIONS  (PRN):  ALBUTerol    90 MICROgram(s) HFA Inhaler 2 Puff(s) Inhalation every 6 hours PRN Shortness of Breath and/or Wheezing

## 2022-03-29 LAB
ALBUMIN SERPL ELPH-MCNC: 3.6 G/DL — SIGNIFICANT CHANGE UP (ref 3.5–5.2)
ALP SERPL-CCNC: 103 U/L — SIGNIFICANT CHANGE UP (ref 30–115)
ALT FLD-CCNC: 9 U/L — SIGNIFICANT CHANGE UP (ref 0–41)
ANION GAP SERPL CALC-SCNC: 10 MMOL/L — SIGNIFICANT CHANGE UP (ref 7–14)
ANION GAP SERPL CALC-SCNC: 13 MMOL/L — SIGNIFICANT CHANGE UP (ref 7–14)
AST SERPL-CCNC: 19 U/L — SIGNIFICANT CHANGE UP (ref 0–41)
BASOPHILS # BLD AUTO: 0.09 K/UL — SIGNIFICANT CHANGE UP (ref 0–0.2)
BASOPHILS NFR BLD AUTO: 1.7 % — HIGH (ref 0–1)
BILIRUB SERPL-MCNC: 0.9 MG/DL — SIGNIFICANT CHANGE UP (ref 0.2–1.2)
BUN SERPL-MCNC: 28 MG/DL — HIGH (ref 10–20)
BUN SERPL-MCNC: 30 MG/DL — HIGH (ref 10–20)
CALCIUM SERPL-MCNC: 9.1 MG/DL — SIGNIFICANT CHANGE UP (ref 8.5–10.1)
CALCIUM SERPL-MCNC: 9.2 MG/DL — SIGNIFICANT CHANGE UP (ref 8.5–10.1)
CHLORIDE SERPL-SCNC: 96 MMOL/L — LOW (ref 98–110)
CHLORIDE SERPL-SCNC: 98 MMOL/L — SIGNIFICANT CHANGE UP (ref 98–110)
CO2 SERPL-SCNC: 30 MMOL/L — SIGNIFICANT CHANGE UP (ref 17–32)
CO2 SERPL-SCNC: 33 MMOL/L — HIGH (ref 17–32)
CREAT SERPL-MCNC: 1 MG/DL — SIGNIFICANT CHANGE UP (ref 0.7–1.5)
CREAT SERPL-MCNC: 1.2 MG/DL — SIGNIFICANT CHANGE UP (ref 0.7–1.5)
EGFR: 53 ML/MIN/1.73M2 — LOW
EGFR: 67 ML/MIN/1.73M2 — SIGNIFICANT CHANGE UP
EOSINOPHIL # BLD AUTO: 0.11 K/UL — SIGNIFICANT CHANGE UP (ref 0–0.7)
EOSINOPHIL NFR BLD AUTO: 2.1 % — SIGNIFICANT CHANGE UP (ref 0–8)
GLUCOSE BLDC GLUCOMTR-MCNC: 123 MG/DL — HIGH (ref 70–99)
GLUCOSE BLDC GLUCOMTR-MCNC: 132 MG/DL — HIGH (ref 70–99)
GLUCOSE BLDC GLUCOMTR-MCNC: 139 MG/DL — HIGH (ref 70–99)
GLUCOSE BLDC GLUCOMTR-MCNC: 148 MG/DL — HIGH (ref 70–99)
GLUCOSE SERPL-MCNC: 141 MG/DL — HIGH (ref 70–99)
GLUCOSE SERPL-MCNC: 159 MG/DL — HIGH (ref 70–99)
HCT VFR BLD CALC: 37.6 % — SIGNIFICANT CHANGE UP (ref 37–47)
HGB BLD-MCNC: 12.1 G/DL — SIGNIFICANT CHANGE UP (ref 12–16)
IMM GRANULOCYTES NFR BLD AUTO: 0.2 % — SIGNIFICANT CHANGE UP (ref 0.1–0.3)
LYMPHOCYTES # BLD AUTO: 1.33 K/UL — SIGNIFICANT CHANGE UP (ref 1.2–3.4)
LYMPHOCYTES # BLD AUTO: 25.7 % — SIGNIFICANT CHANGE UP (ref 20.5–51.1)
MAGNESIUM SERPL-MCNC: 2 MG/DL — SIGNIFICANT CHANGE UP (ref 1.8–2.4)
MAGNESIUM SERPL-MCNC: 2.2 MG/DL — SIGNIFICANT CHANGE UP (ref 1.8–2.4)
MCHC RBC-ENTMCNC: 30 PG — SIGNIFICANT CHANGE UP (ref 27–31)
MCHC RBC-ENTMCNC: 32.2 G/DL — SIGNIFICANT CHANGE UP (ref 32–37)
MCV RBC AUTO: 93.1 FL — SIGNIFICANT CHANGE UP (ref 81–99)
MONOCYTES # BLD AUTO: 0.47 K/UL — SIGNIFICANT CHANGE UP (ref 0.1–0.6)
MONOCYTES NFR BLD AUTO: 9.1 % — SIGNIFICANT CHANGE UP (ref 1.7–9.3)
NEUTROPHILS # BLD AUTO: 3.17 K/UL — SIGNIFICANT CHANGE UP (ref 1.4–6.5)
NEUTROPHILS NFR BLD AUTO: 61.2 % — SIGNIFICANT CHANGE UP (ref 42.2–75.2)
NRBC # BLD: 0 /100 WBCS — SIGNIFICANT CHANGE UP (ref 0–0)
PLATELET # BLD AUTO: 196 K/UL — SIGNIFICANT CHANGE UP (ref 130–400)
POTASSIUM SERPL-MCNC: 4 MMOL/L — SIGNIFICANT CHANGE UP (ref 3.5–5)
POTASSIUM SERPL-MCNC: 4.3 MMOL/L — SIGNIFICANT CHANGE UP (ref 3.5–5)
POTASSIUM SERPL-SCNC: 4 MMOL/L — SIGNIFICANT CHANGE UP (ref 3.5–5)
POTASSIUM SERPL-SCNC: 4.3 MMOL/L — SIGNIFICANT CHANGE UP (ref 3.5–5)
PROCALCITONIN SERPL-MCNC: 0.11 NG/ML — HIGH (ref 0.02–0.1)
PROCALCITONIN SERPL-MCNC: 0.11 NG/ML — HIGH (ref 0.02–0.1)
PROT SERPL-MCNC: 6.5 G/DL — SIGNIFICANT CHANGE UP (ref 6–8)
RBC # BLD: 4.04 M/UL — LOW (ref 4.2–5.4)
RBC # FLD: 17.3 % — HIGH (ref 11.5–14.5)
SODIUM SERPL-SCNC: 139 MMOL/L — SIGNIFICANT CHANGE UP (ref 135–146)
SODIUM SERPL-SCNC: 141 MMOL/L — SIGNIFICANT CHANGE UP (ref 135–146)
WBC # BLD: 5.18 K/UL — SIGNIFICANT CHANGE UP (ref 4.8–10.8)
WBC # FLD AUTO: 5.18 K/UL — SIGNIFICANT CHANGE UP (ref 4.8–10.8)

## 2022-03-29 PROCEDURE — 99233 SBSQ HOSP IP/OBS HIGH 50: CPT

## 2022-03-29 RX ORDER — SPIRONOLACTONE 25 MG/1
25 TABLET, FILM COATED ORAL DAILY
Refills: 0 | Status: DISCONTINUED | OUTPATIENT
Start: 2022-03-29 | End: 2022-04-05

## 2022-03-29 RX ORDER — BUMETANIDE 0.25 MG/ML
1 INJECTION INTRAMUSCULAR; INTRAVENOUS
Qty: 20 | Refills: 0 | Status: DISCONTINUED | OUTPATIENT
Start: 2022-03-29 | End: 2022-04-01

## 2022-03-29 RX ORDER — BUMETANIDE 0.25 MG/ML
2 INJECTION INTRAMUSCULAR; INTRAVENOUS ONCE
Refills: 0 | Status: COMPLETED | OUTPATIENT
Start: 2022-03-29 | End: 2022-03-29

## 2022-03-29 RX ADMIN — PANTOPRAZOLE SODIUM 40 MILLIGRAM(S): 20 TABLET, DELAYED RELEASE ORAL at 08:02

## 2022-03-29 RX ADMIN — Medication 2 GRAM(S): at 18:57

## 2022-03-29 RX ADMIN — Medication 81 MILLIGRAM(S): at 11:53

## 2022-03-29 RX ADMIN — BUMETANIDE 5 MG/HR: 0.25 INJECTION INTRAMUSCULAR; INTRAVENOUS at 20:58

## 2022-03-29 RX ADMIN — ENOXAPARIN SODIUM 40 MILLIGRAM(S): 100 INJECTION SUBCUTANEOUS at 05:35

## 2022-03-29 RX ADMIN — Medication 145 MILLIGRAM(S): at 11:54

## 2022-03-29 RX ADMIN — BUDESONIDE AND FORMOTEROL FUMARATE DIHYDRATE 2 PUFF(S): 160; 4.5 AEROSOL RESPIRATORY (INHALATION) at 20:18

## 2022-03-29 RX ADMIN — Medication 20 MILLIGRAM(S): at 05:35

## 2022-03-29 RX ADMIN — CLOPIDOGREL BISULFATE 75 MILLIGRAM(S): 75 TABLET, FILM COATED ORAL at 11:53

## 2022-03-29 RX ADMIN — ATORVASTATIN CALCIUM 80 MILLIGRAM(S): 80 TABLET, FILM COATED ORAL at 22:28

## 2022-03-29 RX ADMIN — QUETIAPINE FUMARATE 100 MILLIGRAM(S): 200 TABLET, FILM COATED ORAL at 22:28

## 2022-03-29 RX ADMIN — BUMETANIDE 2 MILLIGRAM(S): 0.25 INJECTION INTRAMUSCULAR; INTRAVENOUS at 20:57

## 2022-03-29 RX ADMIN — Medication 50 MILLIGRAM(S): at 05:35

## 2022-03-29 RX ADMIN — Medication 2 GRAM(S): at 05:35

## 2022-03-29 RX ADMIN — SPIRONOLACTONE 25 MILLIGRAM(S): 25 TABLET, FILM COATED ORAL at 17:29

## 2022-03-29 NOTE — PROGRESS NOTE ADULT - SUBJECTIVE AND OBJECTIVE BOX
WILLIAN MARY 57y Female  MRN#: [id number]   CODE STATUS:________      SUBJECTIVE    No acute events overnight. Pt afebrile and HD stable.                                           ----------------------------------------------------------  OBJECTIVE  PAST MEDICAL & SURGICAL HISTORY  Hypertension    Hyperlipidemia    Anxiety and depression    COPD, severe    CHF (congestive heart failure)    Cerebrovascular accident (CVA)  Multiple    Type 2 diabetes mellitus    CKD (chronic kidney disease), stage II    No significant past surgical history                                              -----------------------------------------------------------  ALLERGIES:  No Known Allergies                                            ------------------------------------------------------------    HOME MEDICATIONS  Home Medications:  atorvastatin 80 mg oral tablet: 1 tab(s) orally once a day (at bedtime) (16 Feb 2022 13:50)  fenofibrate 145 mg oral tablet: 1 tab(s) orally once a day (16 Feb 2022 13:50)  magnesium oxide 400 mg oral tablet: 1 tab(s) orally 3 times a day (with meals) (14 Jan 2022 12:37)  pantoprazole 40 mg oral delayed release tablet: 1 tab(s) orally once a day (before a meal) (16 Nov 2021 12:10)                           MEDICATIONS:  STANDING MEDICATIONS  aspirin enteric coated 81 milliGRAM(s) Oral daily  atorvastatin 80 milliGRAM(s) Oral at bedtime  budesonide 160 MICROgram(s)/formoterol 4.5 MICROgram(s) Inhaler 2 Puff(s) Inhalation two times a day  clopidogrel Tablet 75 milliGRAM(s) Oral daily  enoxaparin Injectable 40 milliGRAM(s) SubCutaneous every 24 hours  fenofibrate Tablet 145 milliGRAM(s) Oral daily  influenza   Vaccine 0.5 milliLiter(s) IntraMuscular once  insulin lispro (ADMELOG) corrective regimen sliding scale   SubCutaneous three times a day before meals  metoprolol succinate ER 50 milliGRAM(s) Oral daily  omega-3-Acid Ethyl Esters 2 Gram(s) Oral two times a day  pantoprazole    Tablet 40 milliGRAM(s) Oral before breakfast  QUEtiapine 100 milliGRAM(s) Oral at bedtime  torsemide 20 milliGRAM(s) Oral every 12 hours    PRN MEDICATIONS  acetaminophen     Tablet .. 650 milliGRAM(s) Oral every 6 hours PRN  ALBUTerol    90 MICROgram(s) HFA Inhaler 2 Puff(s) Inhalation every 6 hours PRN  albuterol/ipratropium for Nebulization 3 milliLiter(s) Nebulizer every 6 hours PRN  melatonin 3 milliGRAM(s) Oral at bedtime PRN                                            ------------------------------------------------------------  VITAL SIGNS: Last 24 Hours  T(C): 35.8 (29 Mar 2022 05:04), Max: 36.4 (28 Mar 2022 13:12)  T(F): 96.4 (29 Mar 2022 05:04), Max: 97.5 (28 Mar 2022 13:12)  HR: 98 (29 Mar 2022 05:32) (55 - 98)  BP: 118/60 (29 Mar 2022 05:32) (91/61 - 118/60)  BP(mean): --  RR: 18 (28 Mar 2022 21:00) (18 - 18)  SpO2: 95% (28 Mar 2022 19:52) (95% - 95%)      03-28-22 @ 07:01  -  03-29-22 @ 07:00  --------------------------------------------------------  IN: 850 mL / OUT: 401 mL / NET: 449 mL    03-29-22 @ 07:01  -  03-29-22 @ 11:46  --------------------------------------------------------  IN: 300 mL / OUT: 0 mL / NET: 300 mL                                             --------------------------------------------------------------  LABS:                        12.1   5.18  )-----------( 196      ( 29 Mar 2022 07:30 )             37.6     03-29    139  |  96<L>  |  28<H>  ----------------------------<  159<H>  4.0   |  33<H>  |  1.0    Ca    9.1      29 Mar 2022 07:30  Mg     2.2     03-29    TPro  6.5  /  Alb  3.6  /  TBili  0.9  /  DBili  x   /  AST  19  /  ALT  9   /  AlkPhos  103  03-29            PHYSICAL EXAM:    General: NAD  LUNGS: crackles at lung bases  HEART: RRR, distant heart sounds  ABDOMEN: soft, nontender  EXT: 1+ pitting edema up to the knees, improved from previous  NEURO: AAOx3, CN 2-12 intact                                             --------------------------------------------------------------    ASSESSMENT & PLAN    57 year old female with PMH of COPD on 3L home O2, CORNELIUS on CPAP, HFrEF (19% on TTE 02/2022, life vest at home), pulmonary HTN, CAD s/p PCI to RCA, HTN, HLD, CVA with residual LLE weakness, DM, recently quit smoking (2 months ago) presented to the ED for worsening bilateral LE swelling.     # bilateral LE edema likely secondary to acute on chronic HFrEF exacerbation  # H/O pulmonary HTN   # H/O PAD with bilateral SFA occlusion  - Echo 02/06/2022: 19% EF, small pericardial effusion, moderate p-HTN, mod-severe MR  - Daily weight. Strict I & Os. Keep I < O. Fluid restriction 1L  - pro-BNP on admission 3425, around baseline.   - CXR performed on admission appears to show cardiomegaly with increased vascular congestion, Follow up official read   - Troponin negative on admission, repeat in AM  - No ischemic changes on EKG  - Continue metoprolol 50mg PO daily, spironolactone 50mg PO daily, entresto 97-103mg PO BID   - continue with aspirin 81mg PO daily, plavix 75mg PO daily, and atorvastatin 80mg PO daily.   - Monitor electrolytes, keep K>4 and Mg >2  - patient left life vest at home, will keep on telemetry for now  - Received Lasix 60 IV for one night  - HF evaluated patient, said to give 2mg Bumex push and start on Bumex 1mg/hr drip  - Patient kept on Bumex drip until 3/26  - Patient's edema significantly improved over 24 hours  - Both legs now improved  - EP consulted for AICD, pt says will discuss with family   - AICD on hold until patient euvolemic in any case   - Monitor UOP closely with daily weights   - Given 3 doses of mag on 3/25  - Per HF team bumex was held on 3/26 and patient started on Torsemide 20 BID from 3/27  - Patient became hypotensive on 3/27, diuretics and BP meds held (Only toprol continued)  - Will restart Torsemide 20 BID in the evening of 3/28  - Still holding BP meds for now  - Pt agreeable to AICD, but EP wants patient cleared by Dr. Joo renteria        # COPD on 3L home O2 - doubt active exacerbation  # CORNELIUS on CPAP   - now on 3L NC, at baseline  - Keep SpO2 88-92%.   - continue with symbicort  - duonebs PRN   - Incentive spirometry  - continue with CPAP at night     # H/O CAD s/p PCI to RCA  # H/O CVA with residual L sided weakness   # HTN/ HLD   - continue with aspirin 81mg PO daily, Plavix 75mg PO daily, Metoprolol succinate 50mg PO daily, and fenofibrate 145mg PO daily.   - Continue tp hold entresto 97-103mg PO BID   - Patient had an episode of hypotension on 3/27, held all BP meds  - Will slowly resume BP meds if systolic pressure holds  - Procal obtained per HF to investigate hypotension; 0.11  - Follow up when to resume BP meds      # DM    - monitor FS, if consistently >180, start insulin protocol    # GERD   - continue with protonix     # DVT Prophylaxis: Lovenox  # Diet: DASH/TLC, CC, fluid restriction  # GI Prophylaxis: pantoprazole   # Activity: IAT  # Code Status: Full Code  # Dispo: acute

## 2022-03-29 NOTE — PROGRESS NOTE ADULT - ASSESSMENT
57 year old female with PMH of COPD on 3L home O2, CORNELIUS on CPAP, HFrEF (15-20% 11/2021, life vest at home, on GDMT), pulmonary HTN, CAD s/p PCI to RCA, HTN, HLD, CVA with residual LLE weakness, DM who presented to the ED for worsening bilateral LE swelling. Patient stopped wearing lifevest several weeks ago. Admitted with HF exacerbation, being diuresed.     Impression:  HF Exacerbation, EF 30-35% (15-20% 11/2021)  Hx CAD sp PCI to RCA  Hx HTN, HLD  Hx COPD; CORNELIUS on CPAP    Plan:  - Discussed the option of AICD implant with patient, she is in agreement, Discussed with brother Pérez Ramos.   - Cont tele monitoring while in hospital  - Continue GDMT  - Cont diuresis, appreciate HF recommendations (entresto and spironolactone on hold for hypotension)  - Will plan for ICD once euvolemic  - Monitor electrolytes, maintain WNL  - Will follow    Brother : Pérez Ramos [phone]

## 2022-03-29 NOTE — PROGRESS NOTE ADULT - ATTENDING COMMENTS
Patient remains fluid overloaded, responding poorly to oral diuretics. BP stable.     Resume iv diuretics as above   BMP twice daily - Keep Mg >2.2 and K 4-5   EP follow up for CRT-D once stable    PT follow up

## 2022-03-29 NOTE — PROGRESS NOTE ADULT - SUBJECTIVE AND OBJECTIVE BOX
CHIEF COMPLAINT:    Patient is a 57y old  Female who presents with a chief complaint of CHF     INTERVAL HPI/OVERNIGHT EVENTS:    Patient seen and examined at bedside. No acute overnight events occurred.    ROS: Denies SOB, chest pain. All other systems are negative.    Vital Signs:    T(F): 97.3 (22 @ 12:54), Max: 97.3 (22 @ 12:54)  HR: 93 (22 @ 12:54) (74 - 98)  BP: 115/71 (22 @ 12:54) (91/61 - 118/60)  RR: 18 (22 @ 12:54) (18 - 18)  SpO2: 95% (22 @ 19:52) (95% - 95%)  I&O's Summary    28 Mar 2022 07:  -  29 Mar 2022 07:00  --------------------------------------------------------  IN: 850 mL / OUT: 401 mL / NET: 449 mL    29 Mar 2022 07:  -  29 Mar 2022 14:47  --------------------------------------------------------  IN: 600 mL / OUT: 0 mL / NET: 600 mL      Daily     Daily Weight in k.1 (29 Mar 2022 05:04)  CAPILLARY BLOOD GLUCOSE      POCT Blood Glucose.: 132 mg/dL (29 Mar 2022 11:34)  POCT Blood Glucose.: 123 mg/dL (29 Mar 2022 07:43)  POCT Blood Glucose.: 143 mg/dL (28 Mar 2022 21:11)  POCT Blood Glucose.: 143 mg/dL (28 Mar 2022 16:35)      PHYSICAL EXAM:  GENERAL:  NAD  SKIN: No rashes or lesions  HEENT: Atraumatic. Normocephalic. Anicteric  NECK:  No JVD.   PULMONARY: Clear to ausculation bilaterally. No wheezing. No rales  CVS: Normal S1, S2. Regular rate and rhythm. No murmurs.  ABDOMEN/GI: Soft, Nontender, Nondistended; Bowel sounds are present  EXTREMITIES:  No edema B/L LE.  NEUROLOGIC:  No motor deficit.  PSYCH: Alert & oriented x 3, normal affect    LABS:                        12.1   5.18  )-----------( 196      ( 29 Mar 2022 07:30 )             37.6         139  |  96<L>  |  28<H>  ----------------------------<  159<H>  4.0   |  33<H>  |  1.0    Ca    9.1      29 Mar 2022 07:30  Mg     2.2         TPro  6.5  /  Alb  3.6  /  TBili  0.9  /  DBili  x   /  AST  19  /  ALT  9   /  AlkPhos  103        RADIOLOGY & ADDITIONAL TESTS:  Imaging or report Personally Reviewed:  [ ] YES  [ ] NO    Telemetry reviewed independently - no events    Medications:  Standing  aspirin enteric coated 81 milliGRAM(s) Oral daily  atorvastatin 80 milliGRAM(s) Oral at bedtime  budesonide 160 MICROgram(s)/formoterol 4.5 MICROgram(s) Inhaler 2 Puff(s) Inhalation two times a day  clopidogrel Tablet 75 milliGRAM(s) Oral daily  enoxaparin Injectable 40 milliGRAM(s) SubCutaneous every 24 hours  fenofibrate Tablet 145 milliGRAM(s) Oral daily  influenza   Vaccine 0.5 milliLiter(s) IntraMuscular once  insulin lispro (ADMELOG) corrective regimen sliding scale   SubCutaneous three times a day before meals  metoprolol succinate ER 50 milliGRAM(s) Oral daily  omega-3-Acid Ethyl Esters 2 Gram(s) Oral two times a day  pantoprazole    Tablet 40 milliGRAM(s) Oral before breakfast  QUEtiapine 100 milliGRAM(s) Oral at bedtime  torsemide 20 milliGRAM(s) Oral every 12 hours    PRN Meds  acetaminophen     Tablet .. 650 milliGRAM(s) Oral every 6 hours PRN  ALBUTerol    90 MICROgram(s) HFA Inhaler 2 Puff(s) Inhalation every 6 hours PRN  albuterol/ipratropium for Nebulization 3 milliLiter(s) Nebulizer every 6 hours PRN  melatonin 3 milliGRAM(s) Oral at bedtime PRN      Case discussed with resident  Care discussed with pt

## 2022-03-29 NOTE — PROGRESS NOTE ADULT - ASSESSMENT
58 yo F PMHx COPD on 3L home O2, CORNELIUS on CPAP, HFrEF (19% on TTE 02/2022, life vest at home), pulmonary HTN, CAD s/p PCI to RCA, HTN, HLD, CVA with residual Left LE weakness, DM, recently quit smoking (2 months ago) presented to the ED for worsening bilateral LE swelling.     Acute on Chronic HFrEF  Frequent NSVT  - ECHO 3/23/22: EF improved at 30-35%, mod MR and TR  - monitor renal function and electrolytes  - daily wts, I's and O's  - low sodium diet, fluid restriction 1.5L/day  - very guarded prognosis, has frequent readmissions now with hypotension  - case d/w EP--will plan for AICD on this admission  - entersto, spironolactone held due to hypotension  - as per HF team start Bumex and spironolactone, continue to hold Entresto      Hypotension  - improved with holding meds      CAD s/p PCI  - continue aspirin, plavix, statin, metoprolol    HTN  - controlled    CVA  - c/w ASA/Plavix/statin    COPD on home O2 - on nebs, Symbicort    CORNELIUS on CPAP qhs    DM   - fs controlled    DVT PPX:  Lovenox      Progress Note Handoff  Pending (specify): Monitor BP  pt informed of the plan of care  Disposition: home with services

## 2022-03-29 NOTE — PROGRESS NOTE ADULT - SUBJECTIVE AND OBJECTIVE BOX
INTERVAL HPI/OVERNIGHT EVENTS:  No acute events overnight. In NSR.   Patient denies fever, chills, dizziness, syncope, chest pain, palpitations, SOB, cough, abd pain, n/v/d/c, dysuria, hematuria or unusual rash.     MEDICATIONS  (STANDING):  aspirin enteric coated 81 milliGRAM(s) Oral daily  atorvastatin 80 milliGRAM(s) Oral at bedtime  budesonide 160 MICROgram(s)/formoterol 4.5 MICROgram(s) Inhaler 2 Puff(s) Inhalation two times a day  clopidogrel Tablet 75 milliGRAM(s) Oral daily  enoxaparin Injectable 40 milliGRAM(s) SubCutaneous every 24 hours  fenofibrate Tablet 145 milliGRAM(s) Oral daily  influenza   Vaccine 0.5 milliLiter(s) IntraMuscular once  insulin lispro (ADMELOG) corrective regimen sliding scale   SubCutaneous three times a day before meals  metoprolol succinate ER 50 milliGRAM(s) Oral daily  omega-3-Acid Ethyl Esters 2 Gram(s) Oral two times a day  pantoprazole    Tablet 40 milliGRAM(s) Oral before breakfast  QUEtiapine 100 milliGRAM(s) Oral at bedtime  torsemide 20 milliGRAM(s) Oral every 12 hours    MEDICATIONS  (PRN):  acetaminophen     Tablet .. 650 milliGRAM(s) Oral every 6 hours PRN Temp greater or equal to 38C (100.4F), Mild Pain (1 - 3)  ALBUTerol    90 MICROgram(s) HFA Inhaler 2 Puff(s) Inhalation every 6 hours PRN Shortness of Breath and/or Wheezing  albuterol/ipratropium for Nebulization 3 milliLiter(s) Nebulizer every 6 hours PRN Shortness of Breath and/or Wheezing  melatonin 3 milliGRAM(s) Oral at bedtime PRN Insomnia      Allergies    No Known Allergies    Intolerances        REVIEW OF SYSTEMS    [ ] A ten-point review of systems was otherwise negative except as noted.  [ ] Due to altered mental status/intubation, subjective information were not able to be obtained from the patient. History was obtained, to the extent possible, from review of the chart and collateral sources of information.      Vital Signs Last 24 Hrs  T(C): 35.8 (29 Mar 2022 05:04), Max: 36.4 (28 Mar 2022 13:12)  T(F): 96.4 (29 Mar 2022 05:04), Max: 97.5 (28 Mar 2022 13:12)  HR: 98 (29 Mar 2022 05:32) (55 - 98)  BP: 118/60 (29 Mar 2022 05:32) (91/61 - 118/60)  BP(mean): --  RR: 18 (28 Mar 2022 21:00) (18 - 18)  SpO2: 95% (28 Mar 2022 19:52) (95% - 95%)    03-28-22 @ 07:01  -  03-29-22 @ 07:00  --------------------------------------------------------  IN: 850 mL / OUT: 401 mL / NET: 449 mL    Physical Exam  GENERAL: Ill appearing female, overweight. In no apparent distress, well nourished, and hydrated.  HEART: Regular rate and rhythm; No murmurs, rubs, or gallops.  PULMONARY: Bibasilar rales  ABDOMEN: Soft, Nontender, Nondistended; Bowel sounds present  EXTREMITIES:  2+ Peripheral Pulses, No clubbing, cyanosis, or edema  NEUROLOGICAL: AO x4 ,ROLDAN, speech clear    LABS:                        12.1   5.18  )-----------( 196      ( 29 Mar 2022 07:30 )             37.6     03-29    139  |  96<L>  |  28<H>  ----------------------------<  159<H>  4.0   |  33<H>  |  1.0    Ca    9.1      29 Mar 2022 07:30  Mg     2.2     03-29    TPro  6.5  /  Alb  3.6  /  TBili  0.9  /  DBili  x   /  AST  19  /  ALT  9   /  AlkPhos  103  03-29 03-28-22 @ 07:01  -  03-29-22 @ 07:00  --------------------------------------------------------  IN: 850 mL / OUT: 401 mL / NET: 449 mL        03-28-22 @ 07:01  -  03-29-22 @ 07:00  --------------------------------------------------------  IN: 850 mL / OUT: 401 mL / NET: 449 mL      RADIOLOGY:  < from: TTE Echo Complete w/o Contrast w/ Doppler (03.23.22 @ 11:09) >  Summary:   1. Left ventricular ejection fraction, by visual estimation, is 30 to   35%.   2. Mildly enlarged left atrium.   3. Mildly enlarged right atrium.   4. Mild mitral annular calcification.   5. Moderate mitral valve regurgitation.   6. Moderate tricuspid regurgitation.   7. Mild aortic regurgitation.   8. Sclerotic aortic valve with normal opening.    < end of copied text >    < from: TTE Echo Complete w/o Contrast w/ Doppler (02.06.22 @ 17:21) >  Summary:   1. LV Ejection Fraction by Urena's Method with a biplane EF of 19 %.   2. Severely decreased global left ventricular systolic function.   3. Dilated RV with moderately reduced systolic function.   4. Moderately enlarged left atrium.   5. Severely enlarged right atrium.   6. Sclerotic aorticvalve with normal opening.   7. Mild aortic regurgitation.   8. The mitral valve leaflets are tethered due to reduced systolic   function and elevated LVDP.   9. Moderate-to-severe mitral regurgitation.  10. Severe tricuspid regurgitation.  11. Estimated pulmonary artery systolic pressure is 53.7 mmHg assuming a   right atrial pressure of 15 mmHg, which is consistent with moderate   pulmonary hypertension.  12. Small pericardial effusion adjacent to the right atrium.    < end of copied text >      < from: 12 Lead ECG (03.26.22 @ 07:30) >  Ventricular Rate 94 BPM    Atrial Rate 94 BPM    P-R Interval 216 ms    QRS Duration 154 ms    Q-T Interval 414 ms    QTC Calculation(Bazett) 517 ms    P Axis 45 degrees    R Axis -57 degrees    T Axis 69 degrees    Diagnosis Line Sinus rhythm [id number] degree A-V block  Possible Left atrial enlargement  Left axis deviation  Left ventricular hypertrophy with QRS widening ( Eugene product )  Possible Lateral infarct , age undetermined  Abnormal ECG    < end of copied text >

## 2022-03-29 NOTE — PROGRESS NOTE ADULT - SUBJECTIVE AND OBJECTIVE BOX
Date of Admission: 3/21/22    Interval History:    - Patient assessed resting in bed, on 3L NC (home oxygenation requirements), NAD  - BP improved, kidney function remains stable  - Decreased urinary output, net positive past 24 hours     Chief Complaint: Patient is a 57y old  Female who presents with a chief complaint of B/L edema    HISTORY OF PRESENT ILLNESS: 57 year old female with PMH of COPD on 3L home O2, CORNELIUS on CPAP, HFrEF (19% on TTE 2022, life vest at home), pulmonary HTN, CAD s/p PCI to RCA, HTN, HLD, CVA with residual LLE weakness, DM, recently quit smoking (2 months ago) presented to the ED for worsening bilateral LE swelling. Patient had noted that for the past week prior to admission, her legs began to have progressively worsening swelling and are now tender. The day of admission, patient was in the bath and complained to the HHA who called 911, so she arrived in the ED without her life vest. Patient was discharged from the hospital on  after being diuresed for acute on chronic HFrEF exacerbation. She states that she has been compliant with her diet and medications, denies any NSAID use. She was not able to follow up with any physicians since her last discharge. Otherwise denies any fevers, chills, chest pain, cough, SOB, or urinary symptoms.   In the ED, HR was 104, vitals otherwise stable. Lab work was remarkable for pro-BNP 3452. Given Lasix 40mg IV in the ED and admitted to medicine for further work up and management.  (22 Mar 2022 00:01)      PAST MEDICAL & SURGICAL HISTORY:  Hypertension  Hyperlipidemia  Anxiety and depression  COPD, severe  CHF (congestive heart failure)  Cerebrovascular accident (CVA)Multiple  Type 2 diabetes mellitus  CKD (chronic kidney disease), stage II  No significant past surgical history        FAMILY HISTORY:  No pertinent family history of premature cardiovascular disease in first degree relatives.  Mother:  of cancer at 65  Father:  of an overdose at 50    SOCIAL HISTORY: Former smoker- states she quit since her last admission a month ago. Denies alcohol or drug use.       Allergies  No Known Allergies    Intolerances    PHYSICAL EXAM:  General Appearance: well appearing, normal for age and gender. 	  Neck: unable to assess due to body habitus  Cardiovascular: regular rate and rhythm S1 S2, , No murmurs,+1 B/L edema  Respiratory: posterior lung fields clear  Psychiatry: Alert and oriented x 3, Mood & affect appropriate  Gastrointestinal:  Soft, Non-tender  Skin/Integumentary : No rashes, No ecchymoses, No cyanosis	  Neurologic: Non-focal  Musculoskeletal/ extremities: Normal range of motion, No clubbing, cyanosis   Vascular: Peripheral pulses palpable 2+ bilaterally    CARDIAC MARKERS:  Serum Pro-Brain Natriuretic Peptide: 4892 pg/mL (22 @ 10:50)      TELEMETRY EVENTS: 	    ECG:  	3/21/22      Ventricular Rate 103 BPM  Atrial Rate 103 BPM  P-R Interval 172 ms  QRS Duration 152 ms  Q-T Interval 390 ms  QTC Calculation(Bazett) 510 ms  P Axis 91 degrees  R Axis 196 degrees  T Axis 33 degrees    Diagnosis Line *** Suspect armlead reversal, interpretation assumes no reversal  Sinus tachycardia  Possible Left atrial enlargement  Non-specific intra-ventricular conduction block  Lateral infarct , age undetermined  Abnormal ECG    Confirmed by CARLOTA GAUTHIER MD ([id number]) on 3/22/2022 9:01:55 AM          PREVIOUS DIAGNOSTIC TESTING:      TTE 22    Summary:   1. LV Ejection Fraction by Urena's Method with a biplane EF of 19 %.   2. Severely decreased global left ventricular systolic function.   3. Dilated RV with moderately reduced systolic function.   4. Moderately enlarged left atrium.   5. Severely enlarged right atrium.   6. Sclerotic aorticvalve with normal opening.   7. Mild aortic regurgitation.   8. The mitral valve leaflets are tethered due to reduced systolic   function and elevated LVDP.   9. Moderate-to-severe mitral regurgitation.  10. Severe tricuspid regurgitation.  11. Estimated pulmonary artery systolic pressure is 53.7 mmHg assuming a   right atrial pressure of 15 mmHg, which is consistent with moderate   pulmonary hypertension.  12. Small pericardial effusion adjacent to the right atrium.        TTE 21    Summary:   1. Left ventricular ejection fraction, by visual estimation, is 35 to 40%.   2.Moderately decreased global left ventricular systolic function.   3. Multiple left ventricular regional wall motion abnormalities exist. See wall motion findings.   4. Elevated left atrial and left ventricular end-diastolic pressures.   5. Mild concentric left ventricular hypertrophy.   6. Mildly increased LV wall thickness.   7. Normal left ventricular internal cavity size.   8. Spectral Doppler shows restrictive pattern of left ventricular myocardial filling (Grade III diastolic dysfunction).  9. Moderately reduced RV systolic function.  10. Mildly enlarged left atrium.  11. Moderately enlarged right atrium.  12. Small pericardial effusion.  13. Mild to moderate mitral valve regurgitation.  14. Moderate-severe tricuspid regurgitation.  15.Mild aortic regurgitation.  16. Sclerotic aortic valve with normal opening.  17. Estimated pulmonary artery systolic pressure is 50.0 mmHg assuming a right atrial pressure of 15 mmHg, which is consistent with moderate pulmonary hypertension.  18. Pulmonary hypertension is present.  19. LA volume Index is 36.7 ml/m² ml/m2.    	    Home Medications:  Albuterol (Eqv-ProAir HFA) 90 mcg/inh inhalation aerosol: 2 puff(s) inhaled every 6 hours, As Needed (2021 11:22)  aspirin 81 mg oral tablet: 1 tab(s) orally once a day (2021 11:22)  fenofibrate 145 mg oral tablet: 1 tab(s) orally once a day (2021 11:22)  glipiZIDE 10 mg oral tablet: 1 tab(s) orally 2 times a day (2021 11:22)  Lovaza 1000 mg oral capsule: 2 cap(s) orally 2 times a day (2021 14:52)  metFORMIN 1000 mg oral tablet: 1 tab(s) orally 2 times a day Do not take until  (2021 12:58)  Plavix 75 mg oral tablet: 1 tab(s) orally once a day (2021 11:22)  Vitamin D2 1.25 mg (50,000 intl units) oral capsule: 1 cap(s) orally once a week (2021 14:52)    MEDICATIONS  (STANDING):  aspirin  chewable 81 milliGRAM(s) Oral daily  atorvastatin 80 milliGRAM(s) Oral at bedtime  budesonide 160 MICROgram(s)/formoterol 4.5 MICROgram(s) Inhaler 2 Puff(s) Inhalation two times a day  chlorhexidine 4% Liquid 1 Application(s) Topical <User Schedule>  clopidogrel Tablet 75 milliGRAM(s) Oral daily  enoxaparin Injectable 40 milliGRAM(s) SubCutaneous daily  fenofibrate Tablet 145 milliGRAM(s) Oral daily  furosemide   Injectable 40 milliGRAM(s) IV Push two times a day  magnesium sulfate  IVPB 2 Gram(s) IV Intermittent once  metoprolol succinate ER 50 milliGRAM(s) Oral daily  omega-3-Acid Ethyl Esters 2 Gram(s) Oral two times a day  pantoprazole    Tablet 40 milliGRAM(s) Oral before breakfast  sacubitril 49 mG/valsartan 51 mG 1 Tablet(s) Oral two times a day    MEDICATIONS  (PRN):  ALBUTerol    90 MICROgram(s) HFA Inhaler 2 Puff(s) Inhalation every 6 hours PRN Shortness of Breath and/or Wheezing

## 2022-03-29 NOTE — PROGRESS NOTE ADULT - ASSESSMENT
Acute on chronic systolic HF / Fluid overload / pulmonary HTN / COPD / DM2 / Anxiety     Patient remains fluid overloaded- POCUS exam: IVC 2.8cm, non collapsing   Stop Torsemide 20mg BID  Give Bumex 2 mg IV push, then start a Bumex gtt at 1 mg/hr   Restart Spironolactone 25mg daily   Continue to hold Entresto for now, will consider restarting at lower dose after diuresis    Continue metoprolol XL 50 mg daily   Get BMP twice daily   Maintain potassium >4.0, Mg >2.1  Strict intake and output  Daily weight   Physical therapy / Incentive spirometer  AICD implantation as per EP team   Plan discussed with primary team  Will continue to follow

## 2022-03-30 LAB
ALBUMIN SERPL ELPH-MCNC: 3.7 G/DL — SIGNIFICANT CHANGE UP (ref 3.5–5.2)
ALBUMIN SERPL ELPH-MCNC: 4 G/DL — SIGNIFICANT CHANGE UP (ref 3.5–5.2)
ALP SERPL-CCNC: 102 U/L — SIGNIFICANT CHANGE UP (ref 30–115)
ALP SERPL-CCNC: 106 U/L — SIGNIFICANT CHANGE UP (ref 30–115)
ALT FLD-CCNC: 10 U/L — SIGNIFICANT CHANGE UP (ref 0–41)
ALT FLD-CCNC: 9 U/L — SIGNIFICANT CHANGE UP (ref 0–41)
ANION GAP SERPL CALC-SCNC: 13 MMOL/L — SIGNIFICANT CHANGE UP (ref 7–14)
ANION GAP SERPL CALC-SCNC: 14 MMOL/L — SIGNIFICANT CHANGE UP (ref 7–14)
AST SERPL-CCNC: 17 U/L — SIGNIFICANT CHANGE UP (ref 0–41)
AST SERPL-CCNC: 18 U/L — SIGNIFICANT CHANGE UP (ref 0–41)
BASOPHILS # BLD AUTO: 0.07 K/UL — SIGNIFICANT CHANGE UP (ref 0–0.2)
BASOPHILS NFR BLD AUTO: 1.5 % — HIGH (ref 0–1)
BILIRUB SERPL-MCNC: 0.9 MG/DL — SIGNIFICANT CHANGE UP (ref 0.2–1.2)
BILIRUB SERPL-MCNC: 0.9 MG/DL — SIGNIFICANT CHANGE UP (ref 0.2–1.2)
BUN SERPL-MCNC: 28 MG/DL — HIGH (ref 10–20)
BUN SERPL-MCNC: 30 MG/DL — HIGH (ref 10–20)
CALCIUM SERPL-MCNC: 9.3 MG/DL — SIGNIFICANT CHANGE UP (ref 8.5–10.1)
CALCIUM SERPL-MCNC: 9.7 MG/DL — SIGNIFICANT CHANGE UP (ref 8.5–10.1)
CHLORIDE SERPL-SCNC: 94 MMOL/L — LOW (ref 98–110)
CHLORIDE SERPL-SCNC: 97 MMOL/L — LOW (ref 98–110)
CO2 SERPL-SCNC: 30 MMOL/L — SIGNIFICANT CHANGE UP (ref 17–32)
CO2 SERPL-SCNC: 32 MMOL/L — SIGNIFICANT CHANGE UP (ref 17–32)
CREAT SERPL-MCNC: 1 MG/DL — SIGNIFICANT CHANGE UP (ref 0.7–1.5)
CREAT SERPL-MCNC: 1.1 MG/DL — SIGNIFICANT CHANGE UP (ref 0.7–1.5)
EGFR: 59 ML/MIN/1.73M2 — LOW
EGFR: 67 ML/MIN/1.73M2 — SIGNIFICANT CHANGE UP
EOSINOPHIL # BLD AUTO: 0.09 K/UL — SIGNIFICANT CHANGE UP (ref 0–0.7)
EOSINOPHIL NFR BLD AUTO: 1.9 % — SIGNIFICANT CHANGE UP (ref 0–8)
GLUCOSE BLDC GLUCOMTR-MCNC: 115 MG/DL — HIGH (ref 70–99)
GLUCOSE BLDC GLUCOMTR-MCNC: 130 MG/DL — HIGH (ref 70–99)
GLUCOSE BLDC GLUCOMTR-MCNC: 144 MG/DL — HIGH (ref 70–99)
GLUCOSE BLDC GLUCOMTR-MCNC: 153 MG/DL — HIGH (ref 70–99)
GLUCOSE SERPL-MCNC: 145 MG/DL — HIGH (ref 70–99)
GLUCOSE SERPL-MCNC: 151 MG/DL — HIGH (ref 70–99)
HCT VFR BLD CALC: 39.7 % — SIGNIFICANT CHANGE UP (ref 37–47)
HGB BLD-MCNC: 12.2 G/DL — SIGNIFICANT CHANGE UP (ref 12–16)
IMM GRANULOCYTES NFR BLD AUTO: 0.4 % — HIGH (ref 0.1–0.3)
LYMPHOCYTES # BLD AUTO: 1.07 K/UL — LOW (ref 1.2–3.4)
LYMPHOCYTES # BLD AUTO: 22.2 % — SIGNIFICANT CHANGE UP (ref 20.5–51.1)
MAGNESIUM SERPL-MCNC: 1.7 MG/DL — LOW (ref 1.8–2.4)
MAGNESIUM SERPL-MCNC: 2 MG/DL — SIGNIFICANT CHANGE UP (ref 1.8–2.4)
MCHC RBC-ENTMCNC: 28.9 PG — SIGNIFICANT CHANGE UP (ref 27–31)
MCHC RBC-ENTMCNC: 30.7 G/DL — LOW (ref 32–37)
MCV RBC AUTO: 94.1 FL — SIGNIFICANT CHANGE UP (ref 81–99)
MONOCYTES # BLD AUTO: 0.41 K/UL — SIGNIFICANT CHANGE UP (ref 0.1–0.6)
MONOCYTES NFR BLD AUTO: 8.5 % — SIGNIFICANT CHANGE UP (ref 1.7–9.3)
NEUTROPHILS # BLD AUTO: 3.15 K/UL — SIGNIFICANT CHANGE UP (ref 1.4–6.5)
NEUTROPHILS NFR BLD AUTO: 65.5 % — SIGNIFICANT CHANGE UP (ref 42.2–75.2)
NRBC # BLD: 0 /100 WBCS — SIGNIFICANT CHANGE UP (ref 0–0)
PLATELET # BLD AUTO: 193 K/UL — SIGNIFICANT CHANGE UP (ref 130–400)
POTASSIUM SERPL-MCNC: 3.7 MMOL/L — SIGNIFICANT CHANGE UP (ref 3.5–5)
POTASSIUM SERPL-MCNC: 4.7 MMOL/L — SIGNIFICANT CHANGE UP (ref 3.5–5)
POTASSIUM SERPL-SCNC: 3.7 MMOL/L — SIGNIFICANT CHANGE UP (ref 3.5–5)
POTASSIUM SERPL-SCNC: 4.7 MMOL/L — SIGNIFICANT CHANGE UP (ref 3.5–5)
PROT SERPL-MCNC: 6.9 G/DL — SIGNIFICANT CHANGE UP (ref 6–8)
PROT SERPL-MCNC: 7.4 G/DL — SIGNIFICANT CHANGE UP (ref 6–8)
RBC # BLD: 4.22 M/UL — SIGNIFICANT CHANGE UP (ref 4.2–5.4)
RBC # FLD: 17 % — HIGH (ref 11.5–14.5)
SODIUM SERPL-SCNC: 138 MMOL/L — SIGNIFICANT CHANGE UP (ref 135–146)
SODIUM SERPL-SCNC: 142 MMOL/L — SIGNIFICANT CHANGE UP (ref 135–146)
WBC # BLD: 4.81 K/UL — SIGNIFICANT CHANGE UP (ref 4.8–10.8)
WBC # FLD AUTO: 4.81 K/UL — SIGNIFICANT CHANGE UP (ref 4.8–10.8)

## 2022-03-30 PROCEDURE — 99233 SBSQ HOSP IP/OBS HIGH 50: CPT

## 2022-03-30 RX ORDER — POTASSIUM CHLORIDE 20 MEQ
40 PACKET (EA) ORAL ONCE
Refills: 0 | Status: DISCONTINUED | OUTPATIENT
Start: 2022-03-30 | End: 2022-03-30

## 2022-03-30 RX ORDER — MAGNESIUM OXIDE 400 MG ORAL TABLET 241.3 MG
400 TABLET ORAL
Refills: 0 | Status: DISCONTINUED | OUTPATIENT
Start: 2022-03-30 | End: 2022-04-05

## 2022-03-30 RX ORDER — MAGNESIUM SULFATE 500 MG/ML
2 VIAL (ML) INJECTION ONCE
Refills: 0 | Status: COMPLETED | OUTPATIENT
Start: 2022-03-30 | End: 2022-03-30

## 2022-03-30 RX ORDER — MAGNESIUM OXIDE 400 MG ORAL TABLET 241.3 MG
400 TABLET ORAL ONCE
Refills: 0 | Status: COMPLETED | OUTPATIENT
Start: 2022-03-30 | End: 2022-03-30

## 2022-03-30 RX ORDER — POTASSIUM CHLORIDE 20 MEQ
40 PACKET (EA) ORAL ONCE
Refills: 0 | Status: COMPLETED | OUTPATIENT
Start: 2022-03-30 | End: 2022-03-30

## 2022-03-30 RX ADMIN — Medication 2 GRAM(S): at 17:02

## 2022-03-30 RX ADMIN — PANTOPRAZOLE SODIUM 40 MILLIGRAM(S): 20 TABLET, DELAYED RELEASE ORAL at 05:38

## 2022-03-30 RX ADMIN — MAGNESIUM OXIDE 400 MG ORAL TABLET 400 MILLIGRAM(S): 241.3 TABLET ORAL at 19:27

## 2022-03-30 RX ADMIN — Medication 40 MILLIEQUIVALENT(S): at 15:33

## 2022-03-30 RX ADMIN — ATORVASTATIN CALCIUM 80 MILLIGRAM(S): 80 TABLET, FILM COATED ORAL at 22:15

## 2022-03-30 RX ADMIN — SPIRONOLACTONE 25 MILLIGRAM(S): 25 TABLET, FILM COATED ORAL at 05:38

## 2022-03-30 RX ADMIN — QUETIAPINE FUMARATE 100 MILLIGRAM(S): 200 TABLET, FILM COATED ORAL at 22:15

## 2022-03-30 RX ADMIN — ENOXAPARIN SODIUM 40 MILLIGRAM(S): 100 INJECTION SUBCUTANEOUS at 05:39

## 2022-03-30 RX ADMIN — BUDESONIDE AND FORMOTEROL FUMARATE DIHYDRATE 2 PUFF(S): 160; 4.5 AEROSOL RESPIRATORY (INHALATION) at 08:05

## 2022-03-30 RX ADMIN — Medication 50 MILLIGRAM(S): at 05:39

## 2022-03-30 RX ADMIN — Medication 2 GRAM(S): at 05:39

## 2022-03-30 RX ADMIN — BUDESONIDE AND FORMOTEROL FUMARATE DIHYDRATE 2 PUFF(S): 160; 4.5 AEROSOL RESPIRATORY (INHALATION) at 22:16

## 2022-03-30 RX ADMIN — Medication 145 MILLIGRAM(S): at 11:43

## 2022-03-30 RX ADMIN — Medication 81 MILLIGRAM(S): at 11:43

## 2022-03-30 RX ADMIN — Medication 25 GRAM(S): at 15:34

## 2022-03-30 RX ADMIN — Medication 1: at 11:43

## 2022-03-30 RX ADMIN — CLOPIDOGREL BISULFATE 75 MILLIGRAM(S): 75 TABLET, FILM COATED ORAL at 11:44

## 2022-03-30 NOTE — PROGRESS NOTE ADULT - SUBJECTIVE AND OBJECTIVE BOX
WILLIAN MARY 57y Female  MRN#: [id number]   CODE STATUS:________      SUBJECTIVE    No acute events overnight. Pt afebrile and HD stable.                                           ----------------------------------------------------------  OBJECTIVE  PAST MEDICAL & SURGICAL HISTORY  Hypertension    Hyperlipidemia    Anxiety and depression    COPD, severe    CHF (congestive heart failure)    Cerebrovascular accident (CVA)  Multiple    Type 2 diabetes mellitus    CKD (chronic kidney disease), stage II    No significant past surgical history                                              -----------------------------------------------------------  ALLERGIES:  No Known Allergies                                            ------------------------------------------------------------    HOME MEDICATIONS  Home Medications:  atorvastatin 80 mg oral tablet: 1 tab(s) orally once a day (at bedtime) (16 Feb 2022 13:50)  fenofibrate 145 mg oral tablet: 1 tab(s) orally once a day (16 Feb 2022 13:50)  magnesium oxide 400 mg oral tablet: 1 tab(s) orally 3 times a day (with meals) (14 Jan 2022 12:37)  pantoprazole 40 mg oral delayed release tablet: 1 tab(s) orally once a day (before a meal) (16 Nov 2021 12:10)                           MEDICATIONS:  STANDING MEDICATIONS  aspirin enteric coated 81 milliGRAM(s) Oral daily  atorvastatin 80 milliGRAM(s) Oral at bedtime  budesonide 160 MICROgram(s)/formoterol 4.5 MICROgram(s) Inhaler 2 Puff(s) Inhalation two times a day  buMETAnide Infusion 1 mG/Hr IV Continuous <Continuous>  clopidogrel Tablet 75 milliGRAM(s) Oral daily  enoxaparin Injectable 40 milliGRAM(s) SubCutaneous every 24 hours  fenofibrate Tablet 145 milliGRAM(s) Oral daily  insulin lispro (ADMELOG) corrective regimen sliding scale   SubCutaneous three times a day before meals  metoprolol succinate ER 50 milliGRAM(s) Oral daily  omega-3-Acid Ethyl Esters 2 Gram(s) Oral two times a day  pantoprazole    Tablet 40 milliGRAM(s) Oral before breakfast  QUEtiapine 100 milliGRAM(s) Oral at bedtime  spironolactone 25 milliGRAM(s) Oral daily    PRN MEDICATIONS  acetaminophen     Tablet .. 650 milliGRAM(s) Oral every 6 hours PRN  ALBUTerol    90 MICROgram(s) HFA Inhaler 2 Puff(s) Inhalation every 6 hours PRN  albuterol/ipratropium for Nebulization 3 milliLiter(s) Nebulizer every 6 hours PRN  melatonin 3 milliGRAM(s) Oral at bedtime PRN                                            ------------------------------------------------------------  VITAL SIGNS: Last 24 Hours  T(C): 36.1 (30 Mar 2022 10:00), Max: 36.6 (29 Mar 2022 20:33)  T(F): 97 (30 Mar 2022 10:00), Max: 97.8 (29 Mar 2022 20:33)  HR: 79 (30 Mar 2022 10:00) (78 - 93)  BP: 119/65 (30 Mar 2022 10:00) (109/69 - 122/77)  BP(mean): --  RR: 18 (30 Mar 2022 10:00) (18 - 18)  SpO2: --      03-29-22 @ 07:01  -  03-30-22 @ 07:00  --------------------------------------------------------  IN: 1730 mL / OUT: 3400 mL / NET: -1670 mL    03-30-22 @ 07:01  -  03-30-22 @ 11:10  --------------------------------------------------------  IN: 190 mL / OUT: 950 mL / NET: -760 mL                                             --------------------------------------------------------------  LABS:                        12.2   4.81  )-----------( 193      ( 30 Mar 2022 04:30 )             39.7     03-30    142  |  97<L>  |  28<H>  ----------------------------<  151<H>  3.7   |  32  |  1.0    Ca    9.3      30 Mar 2022 04:30  Mg     1.7     03-30    TPro  6.9  /  Alb  3.7  /  TBili  0.9  /  DBili  x   /  AST  17  /  ALT  9   /  AlkPhos  102  03-30              PHYSICAL EXAM:    General: NAD  LUNGS: crackles at lung bases  HEART: RRR, distant heart sounds  ABDOMEN: soft, nontender  EXT: 1+ pitting edema up to the knees, improved from previous  NEURO: AAOx3, CN 2-12 intact                                             --------------------------------------------------------------    ASSESSMENT & PLAN    57 year old female with PMH of COPD on 3L home O2, CORNELIUS on CPAP, HFrEF (19% on TTE 02/2022, life vest at home), pulmonary HTN, CAD s/p PCI to RCA, HTN, HLD, CVA with residual LLE weakness, DM, recently quit smoking (2 months ago) presented to the ED for worsening bilateral LE swelling.     # bilateral LE edema likely secondary to acute on chronic HFrEF exacerbation  # H/O pulmonary HTN   # H/O PAD with bilateral SFA occlusion  - Echo 02/06/2022: 19% EF, small pericardial effusion, moderate p-HTN, mod-severe MR  - Daily weight. Strict I & Os. Keep I < O. Fluid restriction 1L  - pro-BNP on admission 3425, around baseline.   - CXR performed on admission appears to show cardiomegaly with increased vascular congestion, Follow up official read   - Troponin negative on admission, repeat in AM  - No ischemic changes on EKG  - Continue metoprolol 50mg PO daily, spironolactone 50mg PO daily, entresto 97-103mg PO BID   - continue with aspirin 81mg PO daily, plavix 75mg PO daily, and atorvastatin 80mg PO daily.   - Monitor electrolytes, keep K>4 and Mg >2  - patient left life vest at home, will keep on telemetry for now  - Received Lasix 60 IV for one night  - HF evaluated patient, said to give 2mg Bumex push and start on Bumex 1mg/hr drip  - Patient kept on Bumex drip until 3/26  - Patient's edema significantly improved over 24 hours  - Both legs now improved  - EP consulted for AICD, pt says will discuss with family   - AICD on hold until patient euvolemic in any case   - Monitor UOP closely with daily weights   - Given 3 doses of mag on 3/25  - Per HF team bumex was held on 3/26 and patient started on Torsemide 20 BID from 3/27  - Patient became hypotensive on 3/27, diuretics and BP meds held (Only toprol continued)  - Will restart Torsemide 20 BID in the evening of 3/28  - Per HF patient still volume overloaded on 3/29, recommended restarting Bumex drip and spironolactone and stopping torsemide  - Patient on Bumex drip on 3/30  - Holding Entresto for now, if BP improves can restart  - Pt agreeable to AICD, but EP wants patient cleared by Dr. Ge first        # COPD on 3L home O2 - doubt active exacerbation  # CORNELIUS on CPAP   - now on 3L NC, at baseline  - Keep SpO2 88-92%.   - continue with symbicort  - duonebs PRN   - Incentive spirometry  - continue with CPAP at night     # H/O CAD s/p PCI to RCA  # H/O CVA with residual L sided weakness   # HTN/ HLD   - continue with aspirin 81mg PO daily, Plavix 75mg PO daily, Metoprolol succinate 50mg PO daily, and fenofibrate 145mg PO daily.   - Continue t0 hold entresto 97-103mg PO BID   - Patient had an episode of hypotension on 3/27, held all BP meds  - Will slowly resume BP meds if systolic pressure holds  - Procal obtained per HF to investigate hypotension; 0.11  - Follow up when to resume BP meds      # DM    - monitor FS, if consistently >180, start insulin protocol    # GERD   - continue with protonix     # DVT Prophylaxis: Lovenox  # Diet: DASH/TLC, CC, fluid restriction  # GI Prophylaxis: pantoprazole   # Activity: IAT  # Code Status: Full Code  # Dispo: acute

## 2022-03-30 NOTE — PROGRESS NOTE ADULT - SUBJECTIVE AND OBJECTIVE BOX
WILLIAN MARY  57y  Female      Patient is a 57y old  Female who presents with a chief complaint of CHF (29 Mar 2022 14:24)      INTERVAL HPI/OVERNIGHT EVENTS:  She feels ok,   Vital Signs Last 24 Hrs  T(C): 36.1 (30 Mar 2022 10:00), Max: 36.6 (29 Mar 2022 20:33)  T(F): 97 (30 Mar 2022 10:00), Max: 97.8 (29 Mar 2022 20:33)  HR: 79 (30 Mar 2022 10:00) (78 - 83)  BP: 119/65 (30 Mar 2022 10:00) (109/69 - 122/77)  BP(mean): --  RR: 18 (30 Mar 2022 10:00) (18 - 18)  SpO2: --      03-29-22 @ 07:01  -  03-30-22 @ 07:00  --------------------------------------------------------  IN: 1730 mL / OUT: 3400 mL / NET: -1670 mL    03-30-22 @ 07:01  -  03-30-22 @ 14:25  --------------------------------------------------------  IN: 670 mL / OUT: 1800 mL / NET: -1130 mL            Consultant(s) Notes Reviewed:  [x ] YES  [ ] NO          MEDICATIONS  (STANDING):  aspirin enteric coated 81 milliGRAM(s) Oral daily  atorvastatin 80 milliGRAM(s) Oral at bedtime  budesonide 160 MICROgram(s)/formoterol 4.5 MICROgram(s) Inhaler 2 Puff(s) Inhalation two times a day  buMETAnide Infusion 1 mG/Hr (5 mL/Hr) IV Continuous <Continuous>  clopidogrel Tablet 75 milliGRAM(s) Oral daily  enoxaparin Injectable 40 milliGRAM(s) SubCutaneous every 24 hours  fenofibrate Tablet 145 milliGRAM(s) Oral daily  insulin lispro (ADMELOG) corrective regimen sliding scale   SubCutaneous three times a day before meals  magnesium sulfate  IVPB 2 Gram(s) IV Intermittent once  magnesium sulfate  IVPB 2 Gram(s) IV Intermittent once  metoprolol succinate ER 50 milliGRAM(s) Oral daily  omega-3-Acid Ethyl Esters 2 Gram(s) Oral two times a day  pantoprazole    Tablet 40 milliGRAM(s) Oral before breakfast  potassium chloride   Powder 40 milliEquivalent(s) Oral once  potassium chloride   Powder 40 milliEquivalent(s) Oral once  QUEtiapine 100 milliGRAM(s) Oral at bedtime  spironolactone 25 milliGRAM(s) Oral daily    MEDICATIONS  (PRN):  acetaminophen     Tablet .. 650 milliGRAM(s) Oral every 6 hours PRN Temp greater or equal to 38C (100.4F), Mild Pain (1 - 3)  ALBUTerol    90 MICROgram(s) HFA Inhaler 2 Puff(s) Inhalation every 6 hours PRN Shortness of Breath and/or Wheezing  albuterol/ipratropium for Nebulization 3 milliLiter(s) Nebulizer every 6 hours PRN Shortness of Breath and/or Wheezing  melatonin 3 milliGRAM(s) Oral at bedtime PRN Insomnia      LABS                          12.2   4.81  )-----------( 193      ( 30 Mar 2022 04:30 )             39.7     03-30    142  |  97<L>  |  28<H>  ----------------------------<  151<H>  3.7   |  32  |  1.0    Ca    9.3      30 Mar 2022 04:30  Mg     1.7     03-30    TPro  6.9  /  Alb  3.7  /  TBili  0.9  /  DBili  x   /  AST  17  /  ALT  9   /  AlkPhos  102  03-30          Lactate Trend  03-27 @ 04:30 Lactate:1.2         CAPILLARY BLOOD GLUCOSE      POCT Blood Glucose.: 153 mg/dL (30 Mar 2022 11:35)        RADIOLOGY & ADDITIONAL TESTS:    Imaging Personally Reviewed:  [ ] YES  [ ] NO    HEALTH ISSUES - PROBLEM Dx:          PHYSICAL EXAM:  GENERAL: NAD, well-developed.  HEAD:  Atraumatic, Normocephalic.  EYES: EOMI, PERRLA, conjunctiva and sclera clear.  NECK: Supple, elevated JVP.   CHEST/LUNG: Clear to auscultation bilaterally; No wheeze.  HEART: Regular rate and rhythm; S1 S2.   ABDOMEN: Soft, Nontender, Nondistended; Bowel sounds present.  EXTREMITIES:  2+ Peripheral Pulses, leg edema 1+  PSYCH: AAOx3.  NEUROLOGY: non-focal.  SKIN: No rashes or lesions.

## 2022-03-30 NOTE — PROGRESS NOTE ADULT - ASSESSMENT
56 yo F PMHx COPD on 3L home O2, CORNELIUS on CPAP, HFrEF (19% on TTE 02/2022, life vest at home), pulmonary HTN, CAD s/p PCI to RCA, HTN, HLD, CVA with residual Left LE weakness, DM, recently quit smoking (2 months ago) presented to the ED for worsening bilateral LE swelling.     A/P:   Acute on Chronic HFrEF  Frequent NSVT  SOB, leg edema, Pro-BNP 3458  ECHO 3/23/22: EF improved at 30-35%, mod MR and TR  On bumex drip  Continue Metoprolol, Aldactone. Entresto on hold due to hypotension.   EP follow up for AICD on this admission      CAD s/p PCI  continue aspirin, plavix, statin, metoprolol    HTN  controlled    CVA  mild residual left side weakness.   Continue ASA, Plavix and Lipitor.     COPD on home O2 - on nebs, Symbicort    CORNELIUS on CPAP qhs    DM   - fs controlled    DVT PPX:  Lovenox    Progress Note Handoff  Pending (specify):   pt informed of the plan of care  Disposition: home with services

## 2022-03-30 NOTE — PROGRESS NOTE ADULT - SUBJECTIVE AND OBJECTIVE BOX
Date of Admission: 3/21/22    Interval History:    - Patient assessed resting in bed, on 3L NC (home oxygenation requirements), NAD  - Kidney function remains stable, BP stabilized   - Improved urinary output      Chief Complaint: Patient is a 57y old  Female who presents with a chief complaint of B/L edema    HISTORY OF PRESENT ILLNESS: 57 year old female with PMH of COPD on 3L home O2, CORNELIUS on CPAP, HFrEF (19% on TTE 2022, life vest at home), pulmonary HTN, CAD s/p PCI to RCA, HTN, HLD, CVA with residual LLE weakness, DM, recently quit smoking (2 months ago) presented to the ED for worsening bilateral LE swelling. Patient had noted that for the past week prior to admission, her legs began to have progressively worsening swelling and are now tender. The day of admission, patient was in the bath and complained to the HHA who called 911, so she arrived in the ED without her life vest. Patient was discharged from the hospital on  after being diuresed for acute on chronic HFrEF exacerbation. She states that she has been compliant with her diet and medications, denies any NSAID use. She was not able to follow up with any physicians since her last discharge. Otherwise denies any fevers, chills, chest pain, cough, SOB, or urinary symptoms.   In the ED, HR was 104, vitals otherwise stable. Lab work was remarkable for pro-BNP 3452. Given Lasix 40mg IV in the ED and admitted to medicine for further work up and management.  (22 Mar 2022 00:01)      PAST MEDICAL & SURGICAL HISTORY:  Hypertension  Hyperlipidemia  Anxiety and depression  COPD, severe  CHF (congestive heart failure)  Cerebrovascular accident (CVA)Multiple  Type 2 diabetes mellitus  CKD (chronic kidney disease), stage II  No significant past surgical history        FAMILY HISTORY:  No pertinent family history of premature cardiovascular disease in first degree relatives.  Mother:  of cancer at 65  Father:  of an overdose at 50    SOCIAL HISTORY: Former smoker- states she quit since her last admission a month ago. Denies alcohol or drug use.       Allergies  No Known Allergies    Intolerances    PHYSICAL EXAM:  General Appearance: well appearing, normal for age and gender. 	  Neck: unable to assess due to body habitus  Cardiovascular: regular rate and rhythm S1 S2, , No murmurs,+1 B/L edema  Respiratory: posterior lung fields clear  Psychiatry: Alert and oriented x 3, Mood & affect appropriate  Gastrointestinal:  Soft, Non-tender  Skin/Integumentary : No rashes, No ecchymoses, No cyanosis	  Neurologic: Non-focal  Musculoskeletal/ extremities: Normal range of motion, No clubbing, cyanosis   Vascular: Peripheral pulses palpable 2+ bilaterally    CARDIAC MARKERS:  Serum Pro-Brain Natriuretic Peptide: 4892 pg/mL (22 @ 10:50)      TELEMETRY EVENTS: 	    ECG:  	3/21/22      Ventricular Rate 103 BPM  Atrial Rate 103 BPM  P-R Interval 172 ms  QRS Duration 152 ms  Q-T Interval 390 ms  QTC Calculation(Bazett) 510 ms  P Axis 91 degrees  R Axis 196 degrees  T Axis 33 degrees    Diagnosis Line *** Suspect arm lead reversal, interpretation assumes no reversal  Sinus tachycardia  Possible Left atrial enlargement  Non-specific intra-ventricular conduction block  Lateral infarct , age undetermined  Abnormal ECG    Confirmed by CARLOTA GAUTHIER MD [id number]) on 3/22/2022 9:01:55 AM          PREVIOUS DIAGNOSTIC TESTING:      TTE 22    Summary:   1. LV Ejection Fraction by Urena's Method with a biplane EF of 19 %.   2. Severely decreased global left ventricular systolic function.   3. Dilated RV with moderately reduced systolic function.   4. Moderately enlarged left atrium.   5. Severely enlarged right atrium.   6. Sclerotic aorti cvalve with normal opening.   7. Mild aortic regurgitation.   8. The mitral valve leaflets are tethered due to reduced systolic   function and elevated LVDP.   9. Moderate-to-severe mitral regurgitation.  10. Severe tricuspid regurgitation.  11. Estimated pulmonary artery systolic pressure is 53.7 mmHg assuming a   right atrial pressure of 15 mmHg, which is consistent with moderate   pulmonary hypertension.  12. Small pericardial effusion adjacent to the right atrium.        TTE 21    Summary:   1. Left ventricular ejection fraction, by visual estimation, is 35 to 40%.   2.Moderately decreased global left ventricular systolic function.   3. Multiple left ventricular regional wall motion abnormalities exist. See wall motion findings.   4. Elevated left atrial and left ventricular end-diastolic pressures.   5. Mild concentric left ventricular hypertrophy.   6. Mildly increased LV wall thickness.   7. Normal left ventricular internal cavity size.   8. Spectral Doppler shows restrictive pattern of left ventricular myocardial filling (Grade III diastolic dysfunction).  9. Moderately reduced RV systolic function.  10. Mildly enlarged left atrium.  11. Moderately enlarged right atrium.  12. Small pericardial effusion.  13. Mild to moderate mitral valve regurgitation.  14. Moderate-severe tricuspid regurgitation.  15.Mild aortic regurgitation.  16. Sclerotic aortic valve with normal opening.  17. Estimated pulmonary artery systolic pressure is 50.0 mmHg assuming a right atrial pressure of 15 mmHg, which is consistent with moderate pulmonary hypertension.  18. Pulmonary hypertension is present.  19. LA volume Index is 36.7 ml/m² ml/m2.    	    Home Medications:  Albuterol (Eqv-ProAir HFA) 90 mcg/inh inhalation aerosol: 2 puff(s) inhaled every 6 hours, As Needed (2021 11:22)  aspirin 81 mg oral tablet: 1 tab(s) orally once a day (2021 11:22)  fenofibrate 145 mg oral tablet: 1 tab(s) orally once a day (2021 11:22)  glipiZIDE 10 mg oral tablet: 1 tab(s) orally 2 times a day (2021 11:22)  Lovaza 1000 mg oral capsule: 2 cap(s) orally 2 times a day (2021 14:52)  metFORMIN 1000 mg oral tablet: 1 tab(s) orally 2 times a day Do not take until  (2021 12:58)  Plavix 75 mg oral tablet: 1 tab(s) orally once a day (2021 11:22)  Vitamin D2 1.25 mg (50,000 intl units) oral capsule: 1 cap(s) orally once a week (2021 14:52)    MEDICATIONS  (STANDING):  aspirin  chewable 81 milliGRAM(s) Oral daily  atorvastatin 80 milliGRAM(s) Oral at bedtime  budesonide 160 MICROgram(s)/formoterol 4.5 MICROgram(s) Inhaler 2 Puff(s) Inhalation two times a day  chlorhexidine 4% Liquid 1 Application(s) Topical <User Schedule>  clopidogrel Tablet 75 milliGRAM(s) Oral daily  enoxaparin Injectable 40 milliGRAM(s) SubCutaneous daily  fenofibrate Tablet 145 milliGRAM(s) Oral daily  furosemide   Injectable 40 milliGRAM(s) IV Push two times a day  magnesium sulfate  IVPB 2 Gram(s) IV Intermittent once  metoprolol succinate ER 50 milliGRAM(s) Oral daily  omega-3-Acid Ethyl Esters 2 Gram(s) Oral two times a day  pantoprazole    Tablet 40 milliGRAM(s) Oral before breakfast  sacubitril 49 mG/valsartan 51 mG 1 Tablet(s) Oral two times a day    MEDICATIONS  (PRN):  ALBUTerol    90 MICROgram(s) HFA Inhaler 2 Puff(s) Inhalation every 6 hours PRN Shortness of Breath and/or Wheezing

## 2022-03-30 NOTE — PROGRESS NOTE ADULT - ASSESSMENT
Acute on chronic systolic HF / Fluid overload / pulmonary HTN / COPD / DM2 / Anxiety     Patient remains fluid overloaded- POCUS exam: IVC 2.6cm, non collapsing   Continue Bumex gtt at 1 mg/hr   Continue Spironolactone 25mg daily   Continue to hold Entresto for now, will consider restarting at lower dose after more diuresis    Continue metoprolol XL 50 mg daily   Get BMP twice daily  K 3.7, Mag 1.7 today- replete    Maintain potassium 4.0-5.0, Mg >2.2  Strict intake and output  Daily weight   Physical therapy f/u  Encourage incentive spirometer use  EP team f/u for AICD once more euvolemic   Plan discussed with primary team  Will continue to follow   Acute on chronic systolic HF / Fluid overload / pulmonary HTN / COPD / DM2 / Anxiety     Patient remains fluid overloaded- POCUS exam: IVC 2.6cm, non collapsing   Continue Bumex gtt at 1 mg/hr   Continue Spironolactone 25mg daily   Continue to hold Entresto for now, will consider restarting at lower dose after more diuresis    Continue metoprolol XL 50 mg daily   Get BMP twice daily  K 3.7, Mag 1.7 today- replete    Maintain potassium 4.0-5.0, Mg >2.2  Strict intake and output  Daily weight   Physical therapy f/u  Encourage incentive spirometer use  EP team f/u for AICD once more euvolemic   Plan discussed with primary team  Will continue to follow.

## 2022-03-31 LAB
ALBUMIN SERPL ELPH-MCNC: 3.9 G/DL — SIGNIFICANT CHANGE UP (ref 3.5–5.2)
ALP SERPL-CCNC: 102 U/L — SIGNIFICANT CHANGE UP (ref 30–115)
ALT FLD-CCNC: 8 U/L — SIGNIFICANT CHANGE UP (ref 0–41)
ANION GAP SERPL CALC-SCNC: 13 MMOL/L — SIGNIFICANT CHANGE UP (ref 7–14)
ANION GAP SERPL CALC-SCNC: 15 MMOL/L — HIGH (ref 7–14)
AST SERPL-CCNC: 18 U/L — SIGNIFICANT CHANGE UP (ref 0–41)
BASOPHILS # BLD AUTO: 0.08 K/UL — SIGNIFICANT CHANGE UP (ref 0–0.2)
BASOPHILS NFR BLD AUTO: 1.8 % — HIGH (ref 0–1)
BILIRUB SERPL-MCNC: 1 MG/DL — SIGNIFICANT CHANGE UP (ref 0.2–1.2)
BUN SERPL-MCNC: 33 MG/DL — HIGH (ref 10–20)
BUN SERPL-MCNC: 41 MG/DL — HIGH (ref 10–20)
CALCIUM SERPL-MCNC: 10 MG/DL — SIGNIFICANT CHANGE UP (ref 8.5–10.1)
CALCIUM SERPL-MCNC: 9.7 MG/DL — SIGNIFICANT CHANGE UP (ref 8.5–10.1)
CHLORIDE SERPL-SCNC: 93 MMOL/L — LOW (ref 98–110)
CHLORIDE SERPL-SCNC: 95 MMOL/L — LOW (ref 98–110)
CO2 SERPL-SCNC: 29 MMOL/L — SIGNIFICANT CHANGE UP (ref 17–32)
CO2 SERPL-SCNC: 31 MMOL/L — SIGNIFICANT CHANGE UP (ref 17–32)
CREAT SERPL-MCNC: 1.2 MG/DL — SIGNIFICANT CHANGE UP (ref 0.7–1.5)
CREAT SERPL-MCNC: 1.7 MG/DL — HIGH (ref 0.7–1.5)
EGFR: 35 ML/MIN/1.73M2 — LOW
EGFR: 53 ML/MIN/1.73M2 — LOW
EOSINOPHIL # BLD AUTO: 0.11 K/UL — SIGNIFICANT CHANGE UP (ref 0–0.7)
EOSINOPHIL NFR BLD AUTO: 2.4 % — SIGNIFICANT CHANGE UP (ref 0–8)
GLUCOSE BLDC GLUCOMTR-MCNC: 117 MG/DL — HIGH (ref 70–99)
GLUCOSE BLDC GLUCOMTR-MCNC: 137 MG/DL — HIGH (ref 70–99)
GLUCOSE BLDC GLUCOMTR-MCNC: 151 MG/DL — HIGH (ref 70–99)
GLUCOSE BLDC GLUCOMTR-MCNC: 158 MG/DL — HIGH (ref 70–99)
GLUCOSE SERPL-MCNC: 142 MG/DL — HIGH (ref 70–99)
GLUCOSE SERPL-MCNC: 182 MG/DL — HIGH (ref 70–99)
HCT VFR BLD CALC: 43.1 % — SIGNIFICANT CHANGE UP (ref 37–47)
HGB BLD-MCNC: 13.9 G/DL — SIGNIFICANT CHANGE UP (ref 12–16)
IMM GRANULOCYTES NFR BLD AUTO: 0.4 % — HIGH (ref 0.1–0.3)
LYMPHOCYTES # BLD AUTO: 1.27 K/UL — SIGNIFICANT CHANGE UP (ref 1.2–3.4)
LYMPHOCYTES # BLD AUTO: 28 % — SIGNIFICANT CHANGE UP (ref 20.5–51.1)
MAGNESIUM SERPL-MCNC: 1.8 MG/DL — SIGNIFICANT CHANGE UP (ref 1.8–2.4)
MAGNESIUM SERPL-MCNC: 2 MG/DL — SIGNIFICANT CHANGE UP (ref 1.8–2.4)
MCHC RBC-ENTMCNC: 29.6 PG — SIGNIFICANT CHANGE UP (ref 27–31)
MCHC RBC-ENTMCNC: 32.3 G/DL — SIGNIFICANT CHANGE UP (ref 32–37)
MCV RBC AUTO: 91.7 FL — SIGNIFICANT CHANGE UP (ref 81–99)
MONOCYTES # BLD AUTO: 0.39 K/UL — SIGNIFICANT CHANGE UP (ref 0.1–0.6)
MONOCYTES NFR BLD AUTO: 8.6 % — SIGNIFICANT CHANGE UP (ref 1.7–9.3)
NEUTROPHILS # BLD AUTO: 2.66 K/UL — SIGNIFICANT CHANGE UP (ref 1.4–6.5)
NEUTROPHILS NFR BLD AUTO: 58.8 % — SIGNIFICANT CHANGE UP (ref 42.2–75.2)
NRBC # BLD: 0 /100 WBCS — SIGNIFICANT CHANGE UP (ref 0–0)
PLATELET # BLD AUTO: 185 K/UL — SIGNIFICANT CHANGE UP (ref 130–400)
POTASSIUM SERPL-MCNC: 3.9 MMOL/L — SIGNIFICANT CHANGE UP (ref 3.5–5)
POTASSIUM SERPL-MCNC: 4.6 MMOL/L — SIGNIFICANT CHANGE UP (ref 3.5–5)
POTASSIUM SERPL-SCNC: 3.9 MMOL/L — SIGNIFICANT CHANGE UP (ref 3.5–5)
POTASSIUM SERPL-SCNC: 4.6 MMOL/L — SIGNIFICANT CHANGE UP (ref 3.5–5)
PROT SERPL-MCNC: 7.5 G/DL — SIGNIFICANT CHANGE UP (ref 6–8)
RBC # BLD: 4.7 M/UL — SIGNIFICANT CHANGE UP (ref 4.2–5.4)
RBC # FLD: 16.9 % — HIGH (ref 11.5–14.5)
SODIUM SERPL-SCNC: 137 MMOL/L — SIGNIFICANT CHANGE UP (ref 135–146)
SODIUM SERPL-SCNC: 139 MMOL/L — SIGNIFICANT CHANGE UP (ref 135–146)
WBC # BLD: 4.53 K/UL — LOW (ref 4.8–10.8)
WBC # FLD AUTO: 4.53 K/UL — LOW (ref 4.8–10.8)

## 2022-03-31 PROCEDURE — 99233 SBSQ HOSP IP/OBS HIGH 50: CPT

## 2022-03-31 RX ORDER — POTASSIUM CHLORIDE 20 MEQ
40 PACKET (EA) ORAL ONCE
Refills: 0 | Status: COMPLETED | OUTPATIENT
Start: 2022-03-31 | End: 2022-03-31

## 2022-03-31 RX ORDER — SACUBITRIL AND VALSARTAN 24; 26 MG/1; MG/1
1 TABLET, FILM COATED ORAL
Refills: 0 | Status: DISCONTINUED | OUTPATIENT
Start: 2022-03-31 | End: 2022-03-31

## 2022-03-31 RX ORDER — SACUBITRIL AND VALSARTAN 24; 26 MG/1; MG/1
1 TABLET, FILM COATED ORAL
Refills: 0 | Status: DISCONTINUED | OUTPATIENT
Start: 2022-03-31 | End: 2022-04-05

## 2022-03-31 RX ADMIN — Medication 650 MILLIGRAM(S): at 06:10

## 2022-03-31 RX ADMIN — Medication 145 MILLIGRAM(S): at 13:06

## 2022-03-31 RX ADMIN — Medication 1: at 07:46

## 2022-03-31 RX ADMIN — Medication 2 GRAM(S): at 18:31

## 2022-03-31 RX ADMIN — MAGNESIUM OXIDE 400 MG ORAL TABLET 400 MILLIGRAM(S): 241.3 TABLET ORAL at 11:59

## 2022-03-31 RX ADMIN — ENOXAPARIN SODIUM 40 MILLIGRAM(S): 100 INJECTION SUBCUTANEOUS at 06:03

## 2022-03-31 RX ADMIN — BUMETANIDE 5 MG/HR: 0.25 INJECTION INTRAMUSCULAR; INTRAVENOUS at 11:56

## 2022-03-31 RX ADMIN — Medication 50 MILLIGRAM(S): at 06:03

## 2022-03-31 RX ADMIN — ATORVASTATIN CALCIUM 80 MILLIGRAM(S): 80 TABLET, FILM COATED ORAL at 22:27

## 2022-03-31 RX ADMIN — BUDESONIDE AND FORMOTEROL FUMARATE DIHYDRATE 2 PUFF(S): 160; 4.5 AEROSOL RESPIRATORY (INHALATION) at 21:07

## 2022-03-31 RX ADMIN — Medication 40 MILLIEQUIVALENT(S): at 23:01

## 2022-03-31 RX ADMIN — MAGNESIUM OXIDE 400 MG ORAL TABLET 400 MILLIGRAM(S): 241.3 TABLET ORAL at 17:18

## 2022-03-31 RX ADMIN — QUETIAPINE FUMARATE 100 MILLIGRAM(S): 200 TABLET, FILM COATED ORAL at 22:27

## 2022-03-31 RX ADMIN — CLOPIDOGREL BISULFATE 75 MILLIGRAM(S): 75 TABLET, FILM COATED ORAL at 11:59

## 2022-03-31 RX ADMIN — PANTOPRAZOLE SODIUM 40 MILLIGRAM(S): 20 TABLET, DELAYED RELEASE ORAL at 06:02

## 2022-03-31 RX ADMIN — Medication 40 MILLIEQUIVALENT(S): at 17:18

## 2022-03-31 RX ADMIN — BUDESONIDE AND FORMOTEROL FUMARATE DIHYDRATE 2 PUFF(S): 160; 4.5 AEROSOL RESPIRATORY (INHALATION) at 08:57

## 2022-03-31 RX ADMIN — SPIRONOLACTONE 25 MILLIGRAM(S): 25 TABLET, FILM COATED ORAL at 06:03

## 2022-03-31 RX ADMIN — Medication 2 GRAM(S): at 06:04

## 2022-03-31 RX ADMIN — Medication 81 MILLIGRAM(S): at 11:59

## 2022-03-31 NOTE — PROGRESS NOTE ADULT - ASSESSMENT
Acute on chronic systolic HF / Fluid overload / pulmonary HTN / COPD / DM2 / Anxiety     Patient remains fluid overloaded- POCUS exam: IVC 2.4 cm, non collapsing   Continue Bumex gtt at 1 mg/hr   Continue Spironolactone 25mg daily   Resume Entresto 24/26 mg BID  Continue metoprolol XL 50 mg daily   Get BMP twice daily  Maintain potassium 4.0-5.0, Mg >2.2  Strict intake and output  Daily weight   Physical therapy f/u  Encourage incentive spirometer use  EP team f/u for AICD once more euvolemic   Plan discussed with primary team  Will continue to follow.   Acute on chronic systolic HF / Fluid overload / pulmonary HTN / COPD / DM2 / Anxiety     Patient remains fluid overloaded- POCUS exam: IVC 2.4 cm, non collapsing   Continue Bumex gtt at 1 mg/hr   Continue Spironolactone 25mg daily   Resume Entresto 24/26 mg BID  Continue metoprolol XL 50 mg daily   Get BMP twice daily  Maintain potassium 4.0-5.0, Mg >2.2  Strict intake and output  Daily weight   Physical therapy f/u  Encourage incentive spirometer use  EP team f/u for AICD once more euvolemic   Plan discussed with primary team  Will continue to follow

## 2022-03-31 NOTE — PROGRESS NOTE ADULT - SUBJECTIVE AND OBJECTIVE BOX
Date of Admission: 3/21/22    Interval History:    - Patient assessed resting in bed, in  NAD  - Reports feeling less SOB, "lighter"    Chief Complaint: Patient is a 57y old  Female who presents with a chief complaint of B/L edema    HISTORY OF PRESENT ILLNESS: 57 year old female with PMH of COPD on 3L home O2, CORNELIUS on CPAP, HFrEF (19% on TTE 2022, life vest at home), pulmonary HTN, CAD s/p PCI to RCA, HTN, HLD, CVA with residual LLE weakness, DM, recently quit smoking (2 months ago) presented to the ED for worsening bilateral LE swelling. Patient had noted that for the past week prior to admission, her legs began to have progressively worsening swelling and are now tender. The day of admission, patient was in the bath and complained to the HHA who called 911, so she arrived in the ED without her life vest. Patient was discharged from the hospital on  after being diuresed for acute on chronic HFrEF exacerbation. She states that she has been compliant with her diet and medications, denies any NSAID use. She was not able to follow up with any physicians since her last discharge. Otherwise denies any fevers, chills, chest pain, cough, SOB, or urinary symptoms.   In the ED, HR was 104, vitals otherwise stable. Lab work was remarkable for pro-BNP 3452. Given Lasix 40mg IV in the ED and admitted to medicine for further work up and management.  (22 Mar 2022 00:01)      PAST MEDICAL & SURGICAL HISTORY:  Hypertension  Hyperlipidemia  Anxiety and depression  COPD, severe  CHF (congestive heart failure)  Cerebrovascular accident (CVA)Multiple  Type 2 diabetes mellitus  CKD (chronic kidney disease), stage II  No significant past surgical history        FAMILY HISTORY:  No pertinent family history of premature cardiovascular disease in first degree relatives.  Mother:  of cancer at 65  Father:  of an overdose at 50    SOCIAL HISTORY: Former smoker- states she quit since her last admission a month ago. Denies alcohol or drug use.       Allergies  No Known Allergies    Intolerances    PHYSICAL EXAM:  General Appearance: well appearing, normal for age and gender. 	  Neck: unable to assess due to body habitus  Cardiovascular: regular rate and rhythm S1 S2, , No murmurs,+1 B/L edema  Respiratory: posterior lung fields clear  Psychiatry: Alert and oriented x 3, Mood & affect appropriate  Gastrointestinal:  Soft, Non-tender  Skin/Integumentary : No rashes, No ecchymoses, No cyanosis	  Neurologic: Non-focal  Musculoskeletal/ extremities: Normal range of motion, No clubbing, cyanosis   Vascular: Peripheral pulses palpable 2+ bilaterally    CARDIAC MARKERS:  Serum Pro-Brain Natriuretic Peptide: 4892 pg/mL (22 @ 10:50)      TELEMETRY EVENTS: 	    ECG:  	3/21/22      Ventricular Rate 103 BPM  Atrial Rate 103 BPM  P-R Interval 172 ms  QRS Duration 152 ms  Q-T Interval 390 ms  QTC Calculation(Bazett) 510 ms  P Axis 91 degrees  R Axis 196 degrees  T Axis 33 degrees    Diagnosis Line *** Suspect arm lead reversal, interpretation assumes no reversal  Sinus tachycardia  Possible Left atrial enlargement  Non-specific intra-ventricular conduction block  Lateral infarct , age undetermined  Abnormal ECG    Confirmed by CARLOTA GAUTHIER MD [id number]) on 3/22/2022 9:01:55 AM          PREVIOUS DIAGNOSTIC TESTING:      TTE 22    Summary:   1. LV Ejection Fraction by Urena's Method with a biplane EF of 19 %.   2. Severely decreased global left ventricular systolic function.   3. Dilated RV with moderately reduced systolic function.   4. Moderately enlarged left atrium.   5. Severely enlarged right atrium.   6. Sclerotic aorti cvalve with normal opening.   7. Mild aortic regurgitation.   8. The mitral valve leaflets are tethered due to reduced systolic   function and elevated LVDP.   9. Moderate-to-severe mitral regurgitation.  10. Severe tricuspid regurgitation.  11. Estimated pulmonary artery systolic pressure is 53.7 mmHg assuming a   right atrial pressure of 15 mmHg, which is consistent with moderate   pulmonary hypertension.  12. Small pericardial effusion adjacent to the right atrium.        TTE 21    Summary:   1. Left ventricular ejection fraction, by visual estimation, is 35 to 40%.   2.Moderately decreased global left ventricular systolic function.   3. Multiple left ventricular regional wall motion abnormalities exist. See wall motion findings.   4. Elevated left atrial and left ventricular end-diastolic pressures.   5. Mild concentric left ventricular hypertrophy.   6. Mildly increased LV wall thickness.   7. Normal left ventricular internal cavity size.   8. Spectral Doppler shows restrictive pattern of left ventricular myocardial filling (Grade III diastolic dysfunction).  9. Moderately reduced RV systolic function.  10. Mildly enlarged left atrium.  11. Moderately enlarged right atrium.  12. Small pericardial effusion.  13. Mild to moderate mitral valve regurgitation.  14. Moderate-severe tricuspid regurgitation.  15.Mild aortic regurgitation.  16. Sclerotic aortic valve with normal opening.  17. Estimated pulmonary artery systolic pressure is 50.0 mmHg assuming a right atrial pressure of 15 mmHg, which is consistent with moderate pulmonary hypertension.  18. Pulmonary hypertension is present.  19. LA volume Index is 36.7 ml/m² ml/m2.    	    Home Medications:  Albuterol (Eqv-ProAir HFA) 90 mcg/inh inhalation aerosol: 2 puff(s) inhaled every 6 hours, As Needed (2021 11:22)  aspirin 81 mg oral tablet: 1 tab(s) orally once a day (2021 11:22)  fenofibrate 145 mg oral tablet: 1 tab(s) orally once a day (2021 11:22)  glipiZIDE 10 mg oral tablet: 1 tab(s) orally 2 times a day (2021 11:22)  Lovaza 1000 mg oral capsule: 2 cap(s) orally 2 times a day (2021 14:52)  metFORMIN 1000 mg oral tablet: 1 tab(s) orally 2 times a day Do not take until  (2021 12:58)  Plavix 75 mg oral tablet: 1 tab(s) orally once a day (2021 11:22)  Vitamin D2 1.25 mg (50,000 intl units) oral capsule: 1 cap(s) orally once a week (2021 14:52)    MEDICATIONS  (STANDING):  aspirin  chewable 81 milliGRAM(s) Oral daily  atorvastatin 80 milliGRAM(s) Oral at bedtime  budesonide 160 MICROgram(s)/formoterol 4.5 MICROgram(s) Inhaler 2 Puff(s) Inhalation two times a day  chlorhexidine 4% Liquid 1 Application(s) Topical <User Schedule>  clopidogrel Tablet 75 milliGRAM(s) Oral daily  enoxaparin Injectable 40 milliGRAM(s) SubCutaneous daily  fenofibrate Tablet 145 milliGRAM(s) Oral daily  furosemide   Injectable 40 milliGRAM(s) IV Push two times a day  magnesium sulfate  IVPB 2 Gram(s) IV Intermittent once  metoprolol succinate ER 50 milliGRAM(s) Oral daily  omega-3-Acid Ethyl Esters 2 Gram(s) Oral two times a day  pantoprazole    Tablet 40 milliGRAM(s) Oral before breakfast  sacubitril 49 mG/valsartan 51 mG 1 Tablet(s) Oral two times a day    MEDICATIONS  (PRN):  ALBUTerol    90 MICROgram(s) HFA Inhaler 2 Puff(s) Inhalation every 6 hours PRN Shortness of Breath and/or Wheezing

## 2022-03-31 NOTE — PROGRESS NOTE ADULT - ASSESSMENT
56 yo F PMHx COPD on 3L home O2, CORNELIUS on CPAP, HFrEF (19% on TTE 02/2022, life vest at home), pulmonary HTN, CAD s/p PCI to RCA, HTN, HLD, CVA with residual Left LE weakness, DM, recently quit smoking (2 months ago) presented to the ED for worsening bilateral LE swelling.     A/P:   Acute on Chronic HFrEF  Frequent NSVT  SOB, leg edema, Pro-BNP 3458  ECHO 3/23/22: EF improved at 30-35%, mod MR and TR  On Bumex drip.   Continue Metoprolol, Aldactone. Entresto on hold due to hypotension.   EP follow up for AICD on this admission    CAD s/p PCI  continue aspirin, plavix, statin, metoprolol    HTN  controlled    CVA  mild residual left side weakness.   Continue ASA, Plavix and Lipitor.     COPD on home O2 - on nebs, Symbicort    CORNELIUS on CPAP qhs    DM type 2:   A1C Feb 2022 was 6.8,   FS is controlled    DVT PPX:  Lovenox    Progress Note Handoff  Pending (specify):   pt informed of the plan of carea  Disposition: home with services

## 2022-03-31 NOTE — PROGRESS NOTE ADULT - SUBJECTIVE AND OBJECTIVE BOX
WILLIAN MARY  57y  Female      Patient is a 57y old  Female who presents with a chief complaint of CHF (31 Mar 2022 15:23)      INTERVAL HPI/OVERNIGHT EVENTS:  She feels ok, no new complaints.   Vital Signs Last 24 Hrs  T(C): 36.1 (31 Mar 2022 16:30), Max: 36.6 (31 Mar 2022 00:06)  T(F): 96.9 (31 Mar 2022 16:30), Max: 97.8 (31 Mar 2022 00:06)  HR: 97 (31 Mar 2022 17:15) (72 - 109)  BP: 99/78 (31 Mar 2022 17:15) (98/62 - 117/72)  BP(mean): 84 (31 Mar 2022 17:15) (84 - 91)  RR: 20 (31 Mar 2022 17:15) (18 - 20)  SpO2: 98% (31 Mar 2022 17:15) (96% - 98%)      03-30-22 @ 07:01  -  03-31-22 @ 07:00  --------------------------------------------------------  IN: 1810 mL / OUT: 6451 mL / NET: -4641 mL    03-31-22 @ 07:01  -  03-31-22 @ 18:03  --------------------------------------------------------  IN: 650 mL / OUT: 900 mL / NET: -250 mL            Consultant(s) Notes Reviewed:  [x ] YES  [ ] NO          MEDICATIONS  (STANDING):  aspirin enteric coated 81 milliGRAM(s) Oral daily  atorvastatin 80 milliGRAM(s) Oral at bedtime  budesonide 160 MICROgram(s)/formoterol 4.5 MICROgram(s) Inhaler 2 Puff(s) Inhalation two times a day  buMETAnide Infusion 1 mG/Hr (5 mL/Hr) IV Continuous <Continuous>  clopidogrel Tablet 75 milliGRAM(s) Oral daily  enoxaparin Injectable 40 milliGRAM(s) SubCutaneous every 24 hours  fenofibrate Tablet 145 milliGRAM(s) Oral daily  insulin lispro (ADMELOG) corrective regimen sliding scale   SubCutaneous three times a day before meals  magnesium oxide 400 milliGRAM(s) Oral three times a day with meals  metoprolol succinate ER 50 milliGRAM(s) Oral daily  omega-3-Acid Ethyl Esters 2 Gram(s) Oral two times a day  pantoprazole    Tablet 40 milliGRAM(s) Oral before breakfast  potassium chloride   Powder 40 milliEquivalent(s) Oral once  QUEtiapine 100 milliGRAM(s) Oral at bedtime  sacubitril 24 mG/valsartan 26 mG 1 Tablet(s) Oral two times a day  spironolactone 25 milliGRAM(s) Oral daily    MEDICATIONS  (PRN):  acetaminophen     Tablet .. 650 milliGRAM(s) Oral every 6 hours PRN Temp greater or equal to 38C (100.4F), Mild Pain (1 - 3)  ALBUTerol    90 MICROgram(s) HFA Inhaler 2 Puff(s) Inhalation every 6 hours PRN Shortness of Breath and/or Wheezing  albuterol/ipratropium for Nebulization 3 milliLiter(s) Nebulizer every 6 hours PRN Shortness of Breath and/or Wheezing  melatonin 3 milliGRAM(s) Oral at bedtime PRN Insomnia      LABS                          13.9   4.53  )-----------( 185      ( 31 Mar 2022 08:00 )             43.1     03-31    139  |  93<L>  |  33<H>  ----------------------------<  142<H>  3.9   |  31  |  1.2    Ca    10.0      31 Mar 2022 08:00  Mg     2.0     03-31    TPro  7.5  /  Alb  3.9  /  TBili  1.0  /  DBili  x   /  AST  18  /  ALT  8   /  AlkPhos  102  03-31          Lactate Trend  03-27 @ 04:30 Lactate:1.2         CAPILLARY BLOOD GLUCOSE      POCT Blood Glucose.: 117 mg/dL (31 Mar 2022 16:45)        RADIOLOGY & ADDITIONAL TESTS:    Imaging Personally Reviewed:  [ ] YES  [ ] NO    HEALTH ISSUES - PROBLEM Dx:          PHYSICAL EXAM:  GENERAL: NAD, well-developed.  HEAD:  Atraumatic, Normocephalic.  EYES: EOMI, PERRLA, conjunctiva and sclera clear.  NECK: Supple, elevated JVP.   CHEST/LUNG: Clear to auscultation bilaterally; No wheeze.  HEART: Regular rate and rhythm; S1 S2.   ABDOMEN: Soft, Nontender, Nondistended; Bowel sounds present.  EXTREMITIES:  2+ Peripheral Pulses, leg edema 1+  PSYCH: AAOx3.  NEUROLOGY: non-focal.  SKIN: No rashes or lesions.

## 2022-03-31 NOTE — CHART NOTE - NSCHARTNOTEFT_GEN_A_CORE
Limited Note:    Patient documented with % of meal tray completion.    Diet, DASH/TLC:   Sodium & Cholesterol Restricted  Consistent Carbohydrate {Evening Snack}  1000mL Fluid Restriction ([id number]) (03-22-22 @ 01:24) [Active]    Weight: 83.9kg  Height: 142.2cm  BMI: 41.5kg/m2  Weight 81.6kg in June 2021 per previous RD assessment, no weight loss at this time as weight relatively stable compared to ~1year ago    Skin: Intact no pressure injuries documented    No nutrition risk at this time, can consult dietitian if PO intake decreases  Amargo Couture #[id number]
Please re-call EP when patient is euvolemic and is agreeable to AICD implant.    0352

## 2022-03-31 NOTE — PROGRESS NOTE ADULT - SUBJECTIVE AND OBJECTIVE BOX
WILLIAN MARY 57y Female  MRN#: [id number]   CODE STATUS:________        SUBJECTIVE    No acute events overnight. Patient remained afebrile and HD stable. Remains on bumex drip.                                           ----------------------------------------------------------  OBJECTIVE  PAST MEDICAL & SURGICAL HISTORY  Hypertension    Hyperlipidemia    Anxiety and depression    COPD, severe    CHF (congestive heart failure)    Cerebrovascular accident (CVA)  Multiple    Type 2 diabetes mellitus    CKD (chronic kidney disease), stage II    No significant past surgical history                                              -----------------------------------------------------------  ALLERGIES:  No Known Allergies                                            ------------------------------------------------------------    HOME MEDICATIONS  Home Medications:  atorvastatin 80 mg oral tablet: 1 tab(s) orally once a day (at bedtime) (16 Feb 2022 13:50)  fenofibrate 145 mg oral tablet: 1 tab(s) orally once a day (16 Feb 2022 13:50)  magnesium oxide 400 mg oral tablet: 1 tab(s) orally 3 times a day (with meals) (14 Jan 2022 12:37)  pantoprazole 40 mg oral delayed release tablet: 1 tab(s) orally once a day (before a meal) (16 Nov 2021 12:10)                           MEDICATIONS:  STANDING MEDICATIONS  aspirin enteric coated 81 milliGRAM(s) Oral daily  atorvastatin 80 milliGRAM(s) Oral at bedtime  budesonide 160 MICROgram(s)/formoterol 4.5 MICROgram(s) Inhaler 2 Puff(s) Inhalation two times a day  buMETAnide Infusion 1 mG/Hr IV Continuous <Continuous>  clopidogrel Tablet 75 milliGRAM(s) Oral daily  enoxaparin Injectable 40 milliGRAM(s) SubCutaneous every 24 hours  fenofibrate Tablet 145 milliGRAM(s) Oral daily  insulin lispro (ADMELOG) corrective regimen sliding scale   SubCutaneous three times a day before meals  magnesium oxide 400 milliGRAM(s) Oral three times a day with meals  metoprolol succinate ER 50 milliGRAM(s) Oral daily  omega-3-Acid Ethyl Esters 2 Gram(s) Oral two times a day  pantoprazole    Tablet 40 milliGRAM(s) Oral before breakfast  QUEtiapine 100 milliGRAM(s) Oral at bedtime  spironolactone 25 milliGRAM(s) Oral daily    PRN MEDICATIONS  acetaminophen     Tablet .. 650 milliGRAM(s) Oral every 6 hours PRN  ALBUTerol    90 MICROgram(s) HFA Inhaler 2 Puff(s) Inhalation every 6 hours PRN  albuterol/ipratropium for Nebulization 3 milliLiter(s) Nebulizer every 6 hours PRN  melatonin 3 milliGRAM(s) Oral at bedtime PRN                                            ------------------------------------------------------------  VITAL SIGNS: Last 24 Hours  T(C): 35.9 (31 Mar 2022 12:30), Max: 36.6 (31 Mar 2022 00:06)  T(F): 96.6 (31 Mar 2022 12:30), Max: 97.8 (31 Mar 2022 00:06)  HR: 78 (31 Mar 2022 12:30) (72 - 109)  BP: 100/72 (31 Mar 2022 12:30) (100/72 - 117/72)  BP(mean): 91 (31 Mar 2022 09:00) (91 - 91)  RR: 18 (31 Mar 2022 12:30) (18 - 19)  SpO2: 96% (31 Mar 2022 09:00) (96% - 96%)      03-30-22 @ 07:01  -  03-31-22 @ 07:00  --------------------------------------------------------  IN: 1810 mL / OUT: 6451 mL / NET: -4641 mL    03-31-22 @ 07:01  -  03-31-22 @ 14:27  --------------------------------------------------------  IN: 265 mL / OUT: 600 mL / NET: -335 mL                                             --------------------------------------------------------------  LABS:                        13.9   4.53  )-----------( 185      ( 31 Mar 2022 08:00 )             43.1     03-31    139  |  93<L>  |  33<H>  ----------------------------<  142<H>  3.9   |  31  |  1.2    Ca    10.0      31 Mar 2022 08:00  Mg     2.0     03-31    TPro  7.5  /  Alb  3.9  /  TBili  1.0  /  DBili  x   /  AST  18  /  ALT  8   /  AlkPhos  102  03-31                  PHYSICAL EXAM:      General: NAD  LUNGS: crackles at lung bases  HEART: RRR, distant heart sounds  ABDOMEN: soft, nontender  EXT: 1+ pitting edema up to the knees, improved from previous  NEURO: AAOx3, CN 2-12 intact                                             --------------------------------------------------------------    ASSESSMENT & PLAN    57 year old female with PMH of COPD on 3L home O2, CORNELIUS on CPAP, HFrEF (19% on TTE 02/2022, life vest at home), pulmonary HTN, CAD s/p PCI to RCA, HTN, HLD, CVA with residual LLE weakness, DM, recently quit smoking (2 months ago) presented to the ED for worsening bilateral LE swelling.     # bilateral LE edema likely secondary to acute on chronic HFrEF exacerbation  # H/O pulmonary HTN   # H/O PAD with bilateral SFA occlusion  - Echo 02/06/2022: 19% EF, small pericardial effusion, moderate p-HTN, mod-severe MR  - Daily weight. Strict I & Os. Keep I < O. Fluid restriction 1L  - pro-BNP on admission 3425, around baseline.   - CXR performed on admission appears to show cardiomegaly with increased vascular congestion, Follow up official read   - Troponin negative on admission, repeat in AM  - No ischemic changes on EKG  - Continue metoprolol 50mg PO daily, spironolactone 50mg PO daily, entresto 97-103mg PO BID   - continue with aspirin 81mg PO daily, plavix 75mg PO daily, and atorvastatin 80mg PO daily.   - Monitor electrolytes, keep K>4 and Mg >2  - patient left life vest at home, will keep on telemetry for now  - Received Lasix 60 IV for one night  - HF evaluated patient, said to give 2mg Bumex push and start on Bumex 1mg/hr drip  - Patient kept on Bumex drip until 3/26  - Patient's edema significantly improved over 24 hours  - Both legs now improved  - EP consulted for AICD, pt says will discuss with family   - AICD on hold until patient euvolemic in any case   - Monitor UOP closely with daily weights   - Given 3 doses of mag on 3/25  - Per HF team bumex was held on 3/26 and patient started on Torsemide 20 BID from 3/27  - Patient became hypotensive on 3/27, diuretics and BP meds held (Only toprol continued)  - Had planned to restart Torsemide 20 BID in the evening of 3/28, only received 3 doses  - Per HF patient still volume overloaded on 3/29, recommended restarting Bumex drip and spironolactone and stopping torsemide  - Patient still on Bumex drip on 3/31, pending further HF recs  - Holding Entresto for now, if BP improves can restart  - Pt agreeable to AICD, but EP wants patient cleared by Dr. Ge first        # COPD on 3L home O2 - doubt active exacerbation  # CORNELIUS on CPAP   - now on 3L NC, at baseline  - Keep SpO2 88-92%.   - continue with symbicort  - duonebs PRN   - Incentive spirometry  - continue with CPAP at night     # H/O CAD s/p PCI to RCA  # H/O CVA with residual L sided weakness   # HTN/ HLD   - continue with aspirin 81mg PO daily, Plavix 75mg PO daily, Metoprolol succinate 50mg PO daily, and fenofibrate 145mg PO daily.   - Continue to hold entresto 97-103mg PO BID   - Patient had an episode of hypotension on 3/27, held all BP meds  - Will slowly resume BP meds if systolic pressure holds  - Procal obtained per HF to investigate hypotension; 0.11  - Follow up when to resume BP meds      # DM    - monitor FS, if consistently >180, start insulin protocol    # GERD   - continue with protonix     # DVT Prophylaxis: Lovenox  # Diet: DASH/TLC, CC, fluid restriction  # GI Prophylaxis: pantoprazole   # Activity: IAT  # Code Status: Full Code  # Dispo: acute

## 2022-04-01 LAB
ALBUMIN SERPL ELPH-MCNC: 4.1 G/DL — SIGNIFICANT CHANGE UP (ref 3.5–5.2)
ALP SERPL-CCNC: 103 U/L — SIGNIFICANT CHANGE UP (ref 30–115)
ALT FLD-CCNC: 9 U/L — SIGNIFICANT CHANGE UP (ref 0–41)
ANION GAP SERPL CALC-SCNC: 13 MMOL/L — SIGNIFICANT CHANGE UP (ref 7–14)
ANION GAP SERPL CALC-SCNC: 13 MMOL/L — SIGNIFICANT CHANGE UP (ref 7–14)
AST SERPL-CCNC: 18 U/L — SIGNIFICANT CHANGE UP (ref 0–41)
BASOPHILS # BLD AUTO: 0.12 K/UL — SIGNIFICANT CHANGE UP (ref 0–0.2)
BASOPHILS NFR BLD AUTO: 2.3 % — HIGH (ref 0–1)
BILIRUB SERPL-MCNC: 1 MG/DL — SIGNIFICANT CHANGE UP (ref 0.2–1.2)
BUN SERPL-MCNC: 45 MG/DL — HIGH (ref 10–20)
BUN SERPL-MCNC: 57 MG/DL — HIGH (ref 10–20)
CALCIUM SERPL-MCNC: 10.2 MG/DL — HIGH (ref 8.5–10.1)
CALCIUM SERPL-MCNC: 9.7 MG/DL — SIGNIFICANT CHANGE UP (ref 8.5–10.1)
CHLORIDE SERPL-SCNC: 94 MMOL/L — LOW (ref 98–110)
CHLORIDE SERPL-SCNC: 97 MMOL/L — LOW (ref 98–110)
CO2 SERPL-SCNC: 30 MMOL/L — SIGNIFICANT CHANGE UP (ref 17–32)
CO2 SERPL-SCNC: 30 MMOL/L — SIGNIFICANT CHANGE UP (ref 17–32)
CREAT SERPL-MCNC: 1.3 MG/DL — SIGNIFICANT CHANGE UP (ref 0.7–1.5)
CREAT SERPL-MCNC: 1.8 MG/DL — HIGH (ref 0.7–1.5)
EGFR: 33 ML/MIN/1.73M2 — LOW
EGFR: 49 ML/MIN/1.73M2 — LOW
EOSINOPHIL # BLD AUTO: 0.13 K/UL — SIGNIFICANT CHANGE UP (ref 0–0.7)
EOSINOPHIL NFR BLD AUTO: 2.5 % — SIGNIFICANT CHANGE UP (ref 0–8)
GLUCOSE BLDC GLUCOMTR-MCNC: 121 MG/DL — HIGH (ref 70–99)
GLUCOSE BLDC GLUCOMTR-MCNC: 121 MG/DL — HIGH (ref 70–99)
GLUCOSE BLDC GLUCOMTR-MCNC: 130 MG/DL — HIGH (ref 70–99)
GLUCOSE BLDC GLUCOMTR-MCNC: 137 MG/DL — HIGH (ref 70–99)
GLUCOSE SERPL-MCNC: 125 MG/DL — HIGH (ref 70–99)
GLUCOSE SERPL-MCNC: 154 MG/DL — HIGH (ref 70–99)
HCT VFR BLD CALC: 45.9 % — SIGNIFICANT CHANGE UP (ref 37–47)
HGB BLD-MCNC: 14.5 G/DL — SIGNIFICANT CHANGE UP (ref 12–16)
IMM GRANULOCYTES NFR BLD AUTO: 0.4 % — HIGH (ref 0.1–0.3)
LYMPHOCYTES # BLD AUTO: 1.43 K/UL — SIGNIFICANT CHANGE UP (ref 1.2–3.4)
LYMPHOCYTES # BLD AUTO: 27 % — SIGNIFICANT CHANGE UP (ref 20.5–51.1)
MAGNESIUM SERPL-MCNC: 1.8 MG/DL — SIGNIFICANT CHANGE UP (ref 1.8–2.4)
MAGNESIUM SERPL-MCNC: 1.8 MG/DL — SIGNIFICANT CHANGE UP (ref 1.8–2.4)
MCHC RBC-ENTMCNC: 29 PG — SIGNIFICANT CHANGE UP (ref 27–31)
MCHC RBC-ENTMCNC: 31.6 G/DL — LOW (ref 32–37)
MCV RBC AUTO: 91.8 FL — SIGNIFICANT CHANGE UP (ref 81–99)
MONOCYTES # BLD AUTO: 0.5 K/UL — SIGNIFICANT CHANGE UP (ref 0.1–0.6)
MONOCYTES NFR BLD AUTO: 9.4 % — HIGH (ref 1.7–9.3)
NEUTROPHILS # BLD AUTO: 3.1 K/UL — SIGNIFICANT CHANGE UP (ref 1.4–6.5)
NEUTROPHILS NFR BLD AUTO: 58.4 % — SIGNIFICANT CHANGE UP (ref 42.2–75.2)
NRBC # BLD: 0 /100 WBCS — SIGNIFICANT CHANGE UP (ref 0–0)
PLATELET # BLD AUTO: 220 K/UL — SIGNIFICANT CHANGE UP (ref 130–400)
POTASSIUM SERPL-MCNC: 4.5 MMOL/L — SIGNIFICANT CHANGE UP (ref 3.5–5)
POTASSIUM SERPL-MCNC: 4.9 MMOL/L — SIGNIFICANT CHANGE UP (ref 3.5–5)
POTASSIUM SERPL-SCNC: 4.5 MMOL/L — SIGNIFICANT CHANGE UP (ref 3.5–5)
POTASSIUM SERPL-SCNC: 4.9 MMOL/L — SIGNIFICANT CHANGE UP (ref 3.5–5)
PROT SERPL-MCNC: 7.6 G/DL — SIGNIFICANT CHANGE UP (ref 6–8)
RBC # BLD: 5 M/UL — SIGNIFICANT CHANGE UP (ref 4.2–5.4)
RBC # FLD: 16.4 % — HIGH (ref 11.5–14.5)
SODIUM SERPL-SCNC: 137 MMOL/L — SIGNIFICANT CHANGE UP (ref 135–146)
SODIUM SERPL-SCNC: 140 MMOL/L — SIGNIFICANT CHANGE UP (ref 135–146)
WBC # BLD: 5.3 K/UL — SIGNIFICANT CHANGE UP (ref 4.8–10.8)
WBC # FLD AUTO: 5.3 K/UL — SIGNIFICANT CHANGE UP (ref 4.8–10.8)

## 2022-04-01 PROCEDURE — 99233 SBSQ HOSP IP/OBS HIGH 50: CPT

## 2022-04-01 RX ORDER — MAGNESIUM SULFATE 500 MG/ML
2 VIAL (ML) INJECTION ONCE
Refills: 0 | Status: COMPLETED | OUTPATIENT
Start: 2022-04-01 | End: 2022-04-01

## 2022-04-01 RX ADMIN — ATORVASTATIN CALCIUM 80 MILLIGRAM(S): 80 TABLET, FILM COATED ORAL at 22:10

## 2022-04-01 RX ADMIN — Medication 50 MILLIGRAM(S): at 06:07

## 2022-04-01 RX ADMIN — Medication 2 GRAM(S): at 18:16

## 2022-04-01 RX ADMIN — PANTOPRAZOLE SODIUM 40 MILLIGRAM(S): 20 TABLET, DELAYED RELEASE ORAL at 06:07

## 2022-04-01 RX ADMIN — MAGNESIUM OXIDE 400 MG ORAL TABLET 400 MILLIGRAM(S): 241.3 TABLET ORAL at 08:05

## 2022-04-01 RX ADMIN — CLOPIDOGREL BISULFATE 75 MILLIGRAM(S): 75 TABLET, FILM COATED ORAL at 12:06

## 2022-04-01 RX ADMIN — Medication 145 MILLIGRAM(S): at 12:05

## 2022-04-01 RX ADMIN — MAGNESIUM OXIDE 400 MG ORAL TABLET 400 MILLIGRAM(S): 241.3 TABLET ORAL at 12:05

## 2022-04-01 RX ADMIN — ENOXAPARIN SODIUM 40 MILLIGRAM(S): 100 INJECTION SUBCUTANEOUS at 06:08

## 2022-04-01 RX ADMIN — Medication 81 MILLIGRAM(S): at 12:06

## 2022-04-01 RX ADMIN — SPIRONOLACTONE 25 MILLIGRAM(S): 25 TABLET, FILM COATED ORAL at 06:07

## 2022-04-01 RX ADMIN — SACUBITRIL AND VALSARTAN 1 TABLET(S): 24; 26 TABLET, FILM COATED ORAL at 06:08

## 2022-04-01 RX ADMIN — Medication 2 GRAM(S): at 06:06

## 2022-04-01 RX ADMIN — Medication 20 MILLIGRAM(S): at 22:11

## 2022-04-01 RX ADMIN — BUDESONIDE AND FORMOTEROL FUMARATE DIHYDRATE 2 PUFF(S): 160; 4.5 AEROSOL RESPIRATORY (INHALATION) at 08:05

## 2022-04-01 RX ADMIN — Medication 3 MILLIGRAM(S): at 22:10

## 2022-04-01 RX ADMIN — QUETIAPINE FUMARATE 100 MILLIGRAM(S): 200 TABLET, FILM COATED ORAL at 22:10

## 2022-04-01 RX ADMIN — BUDESONIDE AND FORMOTEROL FUMARATE DIHYDRATE 2 PUFF(S): 160; 4.5 AEROSOL RESPIRATORY (INHALATION) at 21:02

## 2022-04-01 RX ADMIN — MAGNESIUM OXIDE 400 MG ORAL TABLET 400 MILLIGRAM(S): 241.3 TABLET ORAL at 17:56

## 2022-04-01 NOTE — PROGRESS NOTE ADULT - ASSESSMENT
Acute on chronic systolic HF / Fluid overload / pulmonary HTN / COPD / DM2 / Anxiety     Patient has clinically improved, euvolemic on exam- POCUS exam: IVC 1.9cm with respiratory variations    Stop Bumex gtt  Start Torsemide 20mg BID today  Continue Spironolactone 25mg daily   Continue Entresto 24/26 mg BID  Continue metoprolol XL 50 mg daily   Get BMP daily  Mag 1.8- replete   Maintain potassium 4.0-5.0, Mg >2.2  Strict intake and output  Daily weight   Physical therapy f/u  Encourage incentive spirometer use  EP team f/u for AICD   Plan discussed with primary team  Will continue to follow   Acute on chronic systolic HF / Fluid overload / pulmonary HTN / COPD / DM2 / Anxiety     Patient has clinically improved, euvolemic on exam- POCUS exam: IVC 1.9cm with respiratory variations    Stop Bumex gtt  Start Torsemide 20mg BID today  Continue Spironolactone 25mg daily   Continue Entresto 24/26 mg BID  Continue metoprolol XL 50 mg daily   Get BMP daily  Mag 1.8- replete   Maintain potassium 4.0-5.0, Mg >2.2  Strict intake and output  Daily weight   Physical therapy f/u  Encourage incentive spirometer use  EP team f/u for AICD   Plan discussed with primary team  Will continue to follow.

## 2022-04-01 NOTE — PROGRESS NOTE ADULT - SUBJECTIVE AND OBJECTIVE BOX
Date of Admission: 3/21/22    Interval History:    - Patient assessed resting in bed, in NAD  - Reports she was unable to sleep last night, but is feeling well today and denies SOB     Chief Complaint: Patient is a 57y old  Female who presents with a chief complaint of B/L edema    HISTORY OF PRESENT ILLNESS: 57 year old female with PMH of COPD on 3L home O2, CORNLEIUS on CPAP, HFrEF (19% on TTE 2022, life vest at home), pulmonary HTN, CAD s/p PCI to RCA, HTN, HLD, CVA with residual LLE weakness, DM, recently quit smoking (2 months ago) presented to the ED for worsening bilateral LE swelling. Patient had noted that for the past week prior to admission, her legs began to have progressively worsening swelling and are now tender. The day of admission, patient was in the bath and complained to the HHA who called 911, so she arrived in the ED without her life vest. Patient was discharged from the hospital on  after being diuresed for acute on chronic HFrEF exacerbation. She states that she has been compliant with her diet and medications, denies any NSAID use. She was not able to follow up with any physicians since her last discharge. Otherwise denies any fevers, chills, chest pain, cough, SOB, or urinary symptoms.   In the ED, HR was 104, vitals otherwise stable. Lab work was remarkable for pro-BNP 3452. Given Lasix 40mg IV in the ED and admitted to medicine for further work up and management.  (22 Mar 2022 00:01)      PAST MEDICAL & SURGICAL HISTORY:  Hypertension  Hyperlipidemia  Anxiety and depression  COPD, severe  CHF (congestive heart failure)  Cerebrovascular accident (CVA)Multiple  Type 2 diabetes mellitus  CKD (chronic kidney disease), stage II  No significant past surgical history        FAMILY HISTORY:  No pertinent family history of premature cardiovascular disease in first degree relatives.  Mother:  of cancer at 65  Father:  of an overdose at 50    SOCIAL HISTORY: Former smoker- states she quit since her last admission a month ago. Denies alcohol or drug use.       Allergies  No Known Allergies    Intolerances    PHYSICAL EXAM:  General Appearance: well appearing, normal for age and gender. 	  Neck: unable to assess due to body habitus  Cardiovascular: regular rate and rhythm S1 S2, No murmurs, no edema  Respiratory: posterior lung fields clear  Psychiatry: Alert and oriented x 3, Mood & affect appropriate  Gastrointestinal:  Soft, Non-tender  Skin/Integumentary : No rashes, No ecchymoses, No cyanosis	  Neurologic: Non-focal  Musculoskeletal/ extremities: Normal range of motion, No clubbing, cyanosis   Vascular: Peripheral pulses palpable 2+ bilaterally    CARDIAC MARKERS:  Serum Pro-Brain Natriuretic Peptide: 4892 pg/mL (22 @ 10:50)      TELEMETRY EVENTS: 	    ECG:  	3/21/22      Ventricular Rate 103 BPM  Atrial Rate 103 BPM  P-R Interval 172 ms  QRS Duration 152 ms  Q-T Interval 390 ms  QTC Calculation(Bazett) 510 ms  P Axis 91 degrees  R Axis 196 degrees  T Axis 33 degrees    Diagnosis Line *** Suspect arm lead reversal, interpretation assumes no reversal  Sinus tachycardia  Possible Left atrial enlargement  Non-specific intra-ventricular conduction block  Lateral infarct , age undetermined  Abnormal ECG    Confirmed by CARLOTA GAUTHIER MD ([id number]) on 3/22/2022 9:01:55 AM          PREVIOUS DIAGNOSTIC TESTING:      TTE 22    Summary:   1. LV Ejection Fraction by Urena's Method with a biplane EF of 19 %.   2. Severely decreased global left ventricular systolic function.   3. Dilated RV with moderately reduced systolic function.   4. Moderately enlarged left atrium.   5. Severely enlarged right atrium.   6. Sclerotic aorti cvalve with normal opening.   7. Mild aortic regurgitation.   8. The mitral valve leaflets are tethered due to reduced systolic   function and elevated LVDP.   9. Moderate-to-severe mitral regurgitation.  10. Severe tricuspid regurgitation.  11. Estimated pulmonary artery systolic pressure is 53.7 mmHg assuming a   right atrial pressure of 15 mmHg, which is consistent with moderate   pulmonary hypertension.  12. Small pericardial effusion adjacent to the right atrium.        TTE 21    Summary:   1. Left ventricular ejection fraction, by visual estimation, is 35 to 40%.   2.Moderately decreased global left ventricular systolic function.   3. Multiple left ventricular regional wall motion abnormalities exist. See wall motion findings.   4. Elevated left atrial and left ventricular end-diastolic pressures.   5. Mild concentric left ventricular hypertrophy.   6. Mildly increased LV wall thickness.   7. Normal left ventricular internal cavity size.   8. Spectral Doppler shows restrictive pattern of left ventricular myocardial filling (Grade III diastolic dysfunction).  9. Moderately reduced RV systolic function.  10. Mildly enlarged left atrium.  11. Moderately enlarged right atrium.  12. Small pericardial effusion.  13. Mild to moderate mitral valve regurgitation.  14. Moderate-severe tricuspid regurgitation.  15.Mild aortic regurgitation.  16. Sclerotic aortic valve with normal opening.  17. Estimated pulmonary artery systolic pressure is 50.0 mmHg assuming a right atrial pressure of 15 mmHg, which is consistent with moderate pulmonary hypertension.  18. Pulmonary hypertension is present.  19. LA volume Index is 36.7 ml/m² ml/m2.    	    Home Medications:  Albuterol (Eqv-ProAir HFA) 90 mcg/inh inhalation aerosol: 2 puff(s) inhaled every 6 hours, As Needed (2021 11:22)  aspirin 81 mg oral tablet: 1 tab(s) orally once a day (2021 11:22)  fenofibrate 145 mg oral tablet: 1 tab(s) orally once a day (2021 11:22)  glipiZIDE 10 mg oral tablet: 1 tab(s) orally 2 times a day (2021 11:22)  Lovaza 1000 mg oral capsule: 2 cap(s) orally 2 times a day (2021 14:52)  metFORMIN 1000 mg oral tablet: 1 tab(s) orally 2 times a day Do not take until  (2021 12:58)  Plavix 75 mg oral tablet: 1 tab(s) orally once a day (2021 11:22)  Vitamin D2 1.25 mg (50,000 intl units) oral capsule: 1 cap(s) orally once a week (2021 14:52)    MEDICATIONS  (STANDING):  aspirin  chewable 81 milliGRAM(s) Oral daily  atorvastatin 80 milliGRAM(s) Oral at bedtime  budesonide 160 MICROgram(s)/formoterol 4.5 MICROgram(s) Inhaler 2 Puff(s) Inhalation two times a day  chlorhexidine 4% Liquid 1 Application(s) Topical <User Schedule>  clopidogrel Tablet 75 milliGRAM(s) Oral daily  enoxaparin Injectable 40 milliGRAM(s) SubCutaneous daily  fenofibrate Tablet 145 milliGRAM(s) Oral daily  furosemide   Injectable 40 milliGRAM(s) IV Push two times a day  magnesium sulfate  IVPB 2 Gram(s) IV Intermittent once  metoprolol succinate ER 50 milliGRAM(s) Oral daily  omega-3-Acid Ethyl Esters 2 Gram(s) Oral two times a day  pantoprazole    Tablet 40 milliGRAM(s) Oral before breakfast  sacubitril 49 mG/valsartan 51 mG 1 Tablet(s) Oral two times a day    MEDICATIONS  (PRN):  ALBUTerol    90 MICROgram(s) HFA Inhaler 2 Puff(s) Inhalation every 6 hours PRN Shortness of Breath and/or Wheezing

## 2022-04-01 NOTE — PROGRESS NOTE ADULT - SUBJECTIVE AND OBJECTIVE BOX
WILLIAN MARY 57y Female  MRN#: [id number]   CODE STATUS:________        SUBJECTIVE    No acute events overnight. Afebrile and HD stable.                                             OBJECTIVE  PAST MEDICAL & SURGICAL HISTORY  Hypertension    Hyperlipidemia    Anxiety and depression    COPD, severe    CHF (congestive heart failure)    Cerebrovascular accident (CVA)  Multiple    Type 2 diabetes mellitus    CKD (chronic kidney disease), stage II    No significant past surgical history                                              -----------------------------------------------------------  ALLERGIES:  No Known Allergies                                            ------------------------------------------------------------    HOME MEDICATIONS  Home Medications:  atorvastatin 80 mg oral tablet: 1 tab(s) orally once a day (at bedtime) (16 Feb 2022 13:50)  fenofibrate 145 mg oral tablet: 1 tab(s) orally once a day (16 Feb 2022 13:50)  magnesium oxide 400 mg oral tablet: 1 tab(s) orally 3 times a day (with meals) (14 Jan 2022 12:37)  pantoprazole 40 mg oral delayed release tablet: 1 tab(s) orally once a day (before a meal) (16 Nov 2021 12:10)                           MEDICATIONS:  STANDING MEDICATIONS  aspirin enteric coated 81 milliGRAM(s) Oral daily  atorvastatin 80 milliGRAM(s) Oral at bedtime  budesonide 160 MICROgram(s)/formoterol 4.5 MICROgram(s) Inhaler 2 Puff(s) Inhalation two times a day  clopidogrel Tablet 75 milliGRAM(s) Oral daily  enoxaparin Injectable 40 milliGRAM(s) SubCutaneous every 24 hours  fenofibrate Tablet 145 milliGRAM(s) Oral daily  insulin lispro (ADMELOG) corrective regimen sliding scale   SubCutaneous three times a day before meals  magnesium oxide 400 milliGRAM(s) Oral three times a day with meals  metoprolol succinate ER 50 milliGRAM(s) Oral daily  omega-3-Acid Ethyl Esters 2 Gram(s) Oral two times a day  pantoprazole    Tablet 40 milliGRAM(s) Oral before breakfast  QUEtiapine 100 milliGRAM(s) Oral at bedtime  sacubitril 24 mG/valsartan 26 mG 1 Tablet(s) Oral two times a day  spironolactone 25 milliGRAM(s) Oral daily  torsemide 20 milliGRAM(s) Oral two times a day    PRN MEDICATIONS  acetaminophen     Tablet .. 650 milliGRAM(s) Oral every 6 hours PRN  ALBUTerol    90 MICROgram(s) HFA Inhaler 2 Puff(s) Inhalation every 6 hours PRN  albuterol/ipratropium for Nebulization 3 milliLiter(s) Nebulizer every 6 hours PRN  melatonin 3 milliGRAM(s) Oral at bedtime PRN                                            ------------------------------------------------------------  VITAL SIGNS: Last 24 Hours  T(C): 36.1 (01 Apr 2022 12:30), Max: 36.7 (01 Apr 2022 08:42)  T(F): 97 (01 Apr 2022 12:30), Max: 98 (01 Apr 2022 08:42)  HR: 81 (01 Apr 2022 12:30) (51 - 114)  BP: 114/65 (01 Apr 2022 12:30) (98/62 - 114/65)  BP(mean): 84 (31 Mar 2022 17:15) (84 - 84)  RR: 18 (01 Apr 2022 12:30) (18 - 20)  SpO2: 97% (01 Apr 2022 04:46) (97% - 98%)      03-31-22 @ 07:01  -  04-01-22 @ 07:00  --------------------------------------------------------  IN: 1515 mL / OUT: 3600 mL / NET: -2085 mL    04-01-22 @ 07:01  -  04-01-22 @ 13:30  --------------------------------------------------------  IN: 1010 mL / OUT: 0 mL / NET: 1010 mL                                             --------------------------------------------------------------  LABS:                        14.5   5.30  )-----------( 220      ( 01 Apr 2022 06:00 )             45.9     04-01    137  |  94<L>  |  45<H>  ----------------------------<  154<H>  4.5   |  30  |  1.3    Ca    10.2<H>      01 Apr 2022 06:00  Mg     1.8     04-01    TPro  7.6  /  Alb  4.1  /  TBili  1.0  /  DBili  x   /  AST  18  /  ALT  9   /  AlkPhos  103  04-01                  PHYSICAL EXAM:    General: NAD  LUNGS: crackles at lung bases  HEART: RRR, distant heart sounds  ABDOMEN: soft, nontender  EXT: 1+ pitting edema up to the knees, improved from previous  NEURO: AAOx3, CN 2-12 intact                                             --------------------------------------------------------------    ASSESSMENT & PLAN    57 year old female with PMH of COPD on 3L home O2, CORNELIUS on CPAP, HFrEF (19% on TTE 02/2022, life vest at home), pulmonary HTN, CAD s/p PCI to RCA, HTN, HLD, CVA with residual LLE weakness, DM, recently quit smoking (2 months ago) presented to the ED for worsening bilateral LE swelling.     # bilateral LE edema likely secondary to acute on chronic HFrEF exacerbation  # H/O pulmonary HTN   # H/O PAD with bilateral SFA occlusion  - Echo 02/06/2022: 19% EF, small pericardial effusion, moderate p-HTN, mod-severe MR  - Daily weight. Strict I & Os. Keep I < O. Fluid restriction 1L  - pro-BNP on admission 3425, around baseline.   - CXR performed on admission appears to show cardiomegaly with increased vascular congestion, Follow up official read   - Troponin negative on admission, repeat in AM  - No ischemic changes on EKG  - Continue metoprolol 50mg PO daily, spironolactone 50mg PO daily, entresto 97-103mg PO BID   - continue with aspirin 81mg PO daily, plavix 75mg PO daily, and atorvastatin 80mg PO daily.   - Monitor electrolytes, keep K>4 and Mg >2  - patient left life vest at home, will keep on telemetry for now  - Received Lasix 60 IV for one night  - HF evaluated patient, said to give 2mg Bumex push and start on Bumex 1mg/hr drip  - Patient kept on Bumex drip until 3/26  - Patient's edema significantly improved over 24 hours  - Both legs now improved  - EP consulted for AICD, pt says will discuss with family   - AICD on hold until patient euvolemic in any case   - Monitor UOP closely with daily weights   - Given 3 doses of mag on 3/25  - Per HF team bumex was held on 3/26 and patient started on Torsemide 20 BID from 3/27  - Patient became hypotensive on 3/27, diuretics and BP meds held (Only toprol continued)  - Had planned to restart Torsemide 20 BID in the evening of 3/28, only received 3 doses  - Per HF patient still volume overloaded on 3/29, recommended restarting Bumex drip and spironolactone and stopping torsemide  - Bumex drip stopped on 4/1, pt now Euvolemic  - Switched to torsemide 20mg BID starting 4/1 in the evening  - Restarted Entresto on 3/31  - Pt agreeable to AICD, per Dr. Ge pt cleared for AICD  - EP will see pt for AICD         # COPD on 3L home O2 - doubt active exacerbation  # CORNELIUS on CPAP   - now on 3L NC, at baseline  - Keep SpO2 88-92%.   - continue with symbicort  - duonebs PRN   - Incentive spirometry  - continue with CPAP at night     # H/O CAD s/p PCI to RCA  # H/O CVA with residual L sided weakness   # HTN/ HLD   - continue with aspirin 81mg PO daily, Plavix 75mg PO daily, Metoprolol succinate 50mg PO daily, and fenofibrate 145mg PO daily.   - Continue to hold entresto 97-103mg PO BID   - Patient had an episode of hypotension on 3/27, held all BP meds  - Will slowly resume BP meds if systolic pressure holds  - Procal obtained per HF to investigate hypotension; 0.11  - Follow up when to resume BP meds      # DM    - monitor FS, if consistently >180, start insulin protocol    # GERD   - continue with protonix     # DVT Prophylaxis: Lovenox  # Diet: DASH/TLC, CC, fluid restriction  # GI Prophylaxis: pantoprazole   # Activity: IAT  # Code Status: Full Code  # Dispo: acute

## 2022-04-01 NOTE — PROGRESS NOTE ADULT - ASSESSMENT
58 yo F PMHx COPD on 3L home O2, CORNELIUS on CPAP, HFrEF (19% on TTE 02/2022, life vest at home), pulmonary HTN, CAD s/p PCI to RCA, HTN, HLD, CVA with residual Left LE weakness, DM, recently quit smoking (2 months ago) presented to the ED for worsening bilateral LE swelling.     A/P:   Acute on Chronic HFrEF  Frequent NSVT  SOB, leg edema, Pro-BNP 3458  ECHO 3/23/22: EF improved at 30-35%, mod MR and TR  s/p Bumex drip. Start on Torsemide 20mg daily.   Continue Metoprolol, Aldactone. Resume Entresto.   EP follow up for AICD on this admission    CAD s/p PCI  continue aspirin, plavix, statin, metoprolol    HTN  controlled, continue Metoprolol,     CVA  mild residual left side weakness.   Continue ASA, Plavix and Lipitor.     COPD on home O2 - on nebs, Symbicort    CORNELIUS on CPAP qhs    DM type 2:   A1C Feb 2022 was 6.8,   FS is controlled    DVT PPX:  Lovenox    Progress Note Handoff  Pending (specify): pending ICD placement.   Family discussion:   Disposition: home with services

## 2022-04-01 NOTE — PROGRESS NOTE ADULT - SUBJECTIVE AND OBJECTIVE BOX
WILLIAN MARY  57y  Female      Patient is a 57y old  Female who presents with a chief complaint of CHF (01 Apr 2022 13:19)      INTERVAL HPI/OVERNIGHT EVENTS:  She feels better, no chest pain, no SOB.   Vital Signs Last 24 Hrs  T(C): 36.1 (01 Apr 2022 12:30), Max: 36.7 (01 Apr 2022 08:42)  T(F): 97 (01 Apr 2022 12:30), Max: 98 (01 Apr 2022 08:42)  HR: 81 (01 Apr 2022 12:30) (51 - 114)  BP: 114/65 (01 Apr 2022 12:30) (98/62 - 114/65)  BP(mean): 84 (31 Mar 2022 17:15) (84 - 84)  RR: 18 (01 Apr 2022 12:30) (18 - 20)  SpO2: 97% (01 Apr 2022 04:46) (97% - 98%)      03-31-22 @ 07:01  -  04-01-22 @ 07:00  --------------------------------------------------------  IN: 1515 mL / OUT: 3600 mL / NET: -2085 mL    04-01-22 @ 07:01  -  04-01-22 @ 16:25  --------------------------------------------------------  IN: 1010 mL / OUT: 500 mL / NET: 510 mL            Consultant(s) Notes Reviewed:  [x ] YES  [ ] NO          MEDICATIONS  (STANDING):  aspirin enteric coated 81 milliGRAM(s) Oral daily  atorvastatin 80 milliGRAM(s) Oral at bedtime  budesonide 160 MICROgram(s)/formoterol 4.5 MICROgram(s) Inhaler 2 Puff(s) Inhalation two times a day  clopidogrel Tablet 75 milliGRAM(s) Oral daily  enoxaparin Injectable 40 milliGRAM(s) SubCutaneous every 24 hours  fenofibrate Tablet 145 milliGRAM(s) Oral daily  insulin lispro (ADMELOG) corrective regimen sliding scale   SubCutaneous three times a day before meals  magnesium oxide 400 milliGRAM(s) Oral three times a day with meals  metoprolol succinate ER 50 milliGRAM(s) Oral daily  omega-3-Acid Ethyl Esters 2 Gram(s) Oral two times a day  pantoprazole    Tablet 40 milliGRAM(s) Oral before breakfast  QUEtiapine 100 milliGRAM(s) Oral at bedtime  sacubitril 24 mG/valsartan 26 mG 1 Tablet(s) Oral two times a day  spironolactone 25 milliGRAM(s) Oral daily  torsemide 20 milliGRAM(s) Oral two times a day    MEDICATIONS  (PRN):  acetaminophen     Tablet .. 650 milliGRAM(s) Oral every 6 hours PRN Temp greater or equal to 38C (100.4F), Mild Pain (1 - 3)  ALBUTerol    90 MICROgram(s) HFA Inhaler 2 Puff(s) Inhalation every 6 hours PRN Shortness of Breath and/or Wheezing  albuterol/ipratropium for Nebulization 3 milliLiter(s) Nebulizer every 6 hours PRN Shortness of Breath and/or Wheezing  melatonin 3 milliGRAM(s) Oral at bedtime PRN Insomnia      LABS                          14.5   5.30  )-----------( 220      ( 01 Apr 2022 06:00 )             45.9     04-01    137  |  94<L>  |  45<H>  ----------------------------<  154<H>  4.5   |  30  |  1.3    Ca    10.2<H>      01 Apr 2022 06:00  Mg     1.8     04-01    TPro  7.6  /  Alb  4.1  /  TBili  1.0  /  DBili  x   /  AST  18  /  ALT  9   /  AlkPhos  103  04-01          Lactate Trend        CAPILLARY BLOOD GLUCOSE      POCT Blood Glucose.: 130 mg/dL (01 Apr 2022 16:19)        RADIOLOGY & ADDITIONAL TESTS:    Imaging Personally Reviewed:  [ ] YES  [ ] NO    HEALTH ISSUES - PROBLEM Dx:          PHYSICAL EXAM:  GENERAL: NAD, well-developed.  HEAD:  Atraumatic, Normocephalic.  EYES: EOMI, PERRLA, conjunctiva and sclera clear.  NECK: Supple, elevated JVP.   CHEST/LUNG: Clear to auscultation bilaterally; No wheeze.  HEART: Regular rate and rhythm; S1 S2.   ABDOMEN: Soft, Nontender, Nondistended; Bowel sounds present.  EXTREMITIES:  2+ Peripheral Pulses, leg edema 1+  PSYCH: AAOx3.  NEUROLOGY: non-focal.  SKIN: No rashes or lesions.

## 2022-04-02 LAB
ALBUMIN SERPL ELPH-MCNC: 4.3 G/DL — SIGNIFICANT CHANGE UP (ref 3.5–5.2)
ALP SERPL-CCNC: 84 U/L — SIGNIFICANT CHANGE UP (ref 30–115)
ALT FLD-CCNC: 8 U/L — SIGNIFICANT CHANGE UP (ref 0–41)
ANION GAP SERPL CALC-SCNC: 15 MMOL/L — HIGH (ref 7–14)
AST SERPL-CCNC: 16 U/L — SIGNIFICANT CHANGE UP (ref 0–41)
BASOPHILS # BLD AUTO: 0.12 K/UL — SIGNIFICANT CHANGE UP (ref 0–0.2)
BASOPHILS NFR BLD AUTO: 2.3 % — HIGH (ref 0–1)
BILIRUB SERPL-MCNC: 1 MG/DL — SIGNIFICANT CHANGE UP (ref 0.2–1.2)
BUN SERPL-MCNC: 63 MG/DL — CRITICAL HIGH (ref 10–20)
CALCIUM SERPL-MCNC: 10 MG/DL — SIGNIFICANT CHANGE UP (ref 8.5–10.1)
CHLORIDE SERPL-SCNC: 95 MMOL/L — LOW (ref 98–110)
CO2 SERPL-SCNC: 29 MMOL/L — SIGNIFICANT CHANGE UP (ref 17–32)
CREAT SERPL-MCNC: 1.6 MG/DL — HIGH (ref 0.7–1.5)
EGFR: 38 ML/MIN/1.73M2 — LOW
EOSINOPHIL # BLD AUTO: 0.13 K/UL — SIGNIFICANT CHANGE UP (ref 0–0.7)
EOSINOPHIL NFR BLD AUTO: 2.5 % — SIGNIFICANT CHANGE UP (ref 0–8)
GLUCOSE BLDC GLUCOMTR-MCNC: 121 MG/DL — HIGH (ref 70–99)
GLUCOSE BLDC GLUCOMTR-MCNC: 122 MG/DL — HIGH (ref 70–99)
GLUCOSE BLDC GLUCOMTR-MCNC: 123 MG/DL — HIGH (ref 70–99)
GLUCOSE BLDC GLUCOMTR-MCNC: 128 MG/DL — HIGH (ref 70–99)
GLUCOSE SERPL-MCNC: 124 MG/DL — HIGH (ref 70–99)
HCT VFR BLD CALC: 45 % — SIGNIFICANT CHANGE UP (ref 37–47)
HGB BLD-MCNC: 14.7 G/DL — SIGNIFICANT CHANGE UP (ref 12–16)
IMM GRANULOCYTES NFR BLD AUTO: 0.2 % — SIGNIFICANT CHANGE UP (ref 0.1–0.3)
LYMPHOCYTES # BLD AUTO: 1.75 K/UL — SIGNIFICANT CHANGE UP (ref 1.2–3.4)
LYMPHOCYTES # BLD AUTO: 34 % — SIGNIFICANT CHANGE UP (ref 20.5–51.1)
MAGNESIUM SERPL-MCNC: 2 MG/DL — SIGNIFICANT CHANGE UP (ref 1.8–2.4)
MCHC RBC-ENTMCNC: 29.6 PG — SIGNIFICANT CHANGE UP (ref 27–31)
MCHC RBC-ENTMCNC: 32.7 G/DL — SIGNIFICANT CHANGE UP (ref 32–37)
MCV RBC AUTO: 90.5 FL — SIGNIFICANT CHANGE UP (ref 81–99)
MONOCYTES # BLD AUTO: 0.56 K/UL — SIGNIFICANT CHANGE UP (ref 0.1–0.6)
MONOCYTES NFR BLD AUTO: 10.9 % — HIGH (ref 1.7–9.3)
NEUTROPHILS # BLD AUTO: 2.57 K/UL — SIGNIFICANT CHANGE UP (ref 1.4–6.5)
NEUTROPHILS NFR BLD AUTO: 50.1 % — SIGNIFICANT CHANGE UP (ref 42.2–75.2)
NRBC # BLD: 0 /100 WBCS — SIGNIFICANT CHANGE UP (ref 0–0)
PLATELET # BLD AUTO: 225 K/UL — SIGNIFICANT CHANGE UP (ref 130–400)
POTASSIUM SERPL-MCNC: 4.8 MMOL/L — SIGNIFICANT CHANGE UP (ref 3.5–5)
POTASSIUM SERPL-SCNC: 4.8 MMOL/L — SIGNIFICANT CHANGE UP (ref 3.5–5)
PROT SERPL-MCNC: 7.6 G/DL — SIGNIFICANT CHANGE UP (ref 6–8)
RBC # BLD: 4.97 M/UL — SIGNIFICANT CHANGE UP (ref 4.2–5.4)
RBC # FLD: 16.3 % — HIGH (ref 11.5–14.5)
SODIUM SERPL-SCNC: 139 MMOL/L — SIGNIFICANT CHANGE UP (ref 135–146)
WBC # BLD: 5.14 K/UL — SIGNIFICANT CHANGE UP (ref 4.8–10.8)
WBC # FLD AUTO: 5.14 K/UL — SIGNIFICANT CHANGE UP (ref 4.8–10.8)

## 2022-04-02 PROCEDURE — 99233 SBSQ HOSP IP/OBS HIGH 50: CPT

## 2022-04-02 RX ADMIN — QUETIAPINE FUMARATE 100 MILLIGRAM(S): 200 TABLET, FILM COATED ORAL at 22:01

## 2022-04-02 RX ADMIN — Medication 50 MILLIGRAM(S): at 06:33

## 2022-04-02 RX ADMIN — ENOXAPARIN SODIUM 40 MILLIGRAM(S): 100 INJECTION SUBCUTANEOUS at 06:30

## 2022-04-02 RX ADMIN — Medication 20 MILLIGRAM(S): at 06:31

## 2022-04-02 RX ADMIN — ATORVASTATIN CALCIUM 80 MILLIGRAM(S): 80 TABLET, FILM COATED ORAL at 22:01

## 2022-04-02 RX ADMIN — MAGNESIUM OXIDE 400 MG ORAL TABLET 400 MILLIGRAM(S): 241.3 TABLET ORAL at 12:16

## 2022-04-02 RX ADMIN — PANTOPRAZOLE SODIUM 40 MILLIGRAM(S): 20 TABLET, DELAYED RELEASE ORAL at 06:33

## 2022-04-02 RX ADMIN — Medication 2 GRAM(S): at 06:31

## 2022-04-02 RX ADMIN — Medication 2 GRAM(S): at 17:45

## 2022-04-02 RX ADMIN — SPIRONOLACTONE 25 MILLIGRAM(S): 25 TABLET, FILM COATED ORAL at 06:32

## 2022-04-02 RX ADMIN — Medication 81 MILLIGRAM(S): at 12:17

## 2022-04-02 RX ADMIN — Medication 145 MILLIGRAM(S): at 12:16

## 2022-04-02 RX ADMIN — MAGNESIUM OXIDE 400 MG ORAL TABLET 400 MILLIGRAM(S): 241.3 TABLET ORAL at 17:44

## 2022-04-02 RX ADMIN — CLOPIDOGREL BISULFATE 75 MILLIGRAM(S): 75 TABLET, FILM COATED ORAL at 12:17

## 2022-04-02 RX ADMIN — SACUBITRIL AND VALSARTAN 1 TABLET(S): 24; 26 TABLET, FILM COATED ORAL at 06:32

## 2022-04-02 RX ADMIN — MAGNESIUM OXIDE 400 MG ORAL TABLET 400 MILLIGRAM(S): 241.3 TABLET ORAL at 08:10

## 2022-04-02 RX ADMIN — Medication 20 MILLIGRAM(S): at 21:34

## 2022-04-02 RX ADMIN — BUDESONIDE AND FORMOTEROL FUMARATE DIHYDRATE 2 PUFF(S): 160; 4.5 AEROSOL RESPIRATORY (INHALATION) at 08:10

## 2022-04-02 NOTE — PROGRESS NOTE ADULT - ASSESSMENT
58 yo F PMH of COPD on 3L home O2, CORNELIUS on CPAP, HFrEF (19% on TTE 02/2022, life vest at home), pulmonary HTN, CAD s/p PCI to RCA, HTN, HLD, CVA with residual Left LE weakness, DM, recently quit smoking (2 months ago) presented to the ED for worsening bilateral LE swelling.     A/P:   Acute on Chronic HFrEF  Frequent NSVT  SOB, leg edema, Pro-BNP 3458  ECHO 3/23/22: EF improved at 30-35%, mod MR and TR  s/p Bumex drip. Start on Torsemide 20mg daily.   Continue Metoprolol, Aldactone. Resume Entresto.   EP follow up for AICD on this admission    CAD s/p PCI  continue aspirin, Plavix, statin, metoprolol    HTN  controlled, continue Metoprolol,     CVA  mild residual left side weakness.   Continue ASA, Plavix and Lipitor.     COPD on home O2 - on nebs, Symbicort    CORNELIUS on CPAP qhs    DM type 2:   A1C Feb 2022 was 6.8,   FS is controlled    DVT PPX:  Lovenox    Progress Note Handoff  Pending (specify): pending ICD placement.   Family discussion:   Disposition: home with services

## 2022-04-02 NOTE — PROGRESS NOTE ADULT - SUBJECTIVE AND OBJECTIVE BOX
WILLIAN MARY  57y  Female      Patient is a 57y old  Female who presents with a chief complaint of CHF (01 Apr 2022 13:19)      INTERVAL HPI/OVERNIGHT EVENTS:  She feels ok, no new complaints.   Vital Signs Last 24 Hrs  T(C): 36.1 (02 Apr 2022 05:43), Max: 36.2 (01 Apr 2022 21:28)  T(F): 96.9 (02 Apr 2022 05:43), Max: 97.1 (01 Apr 2022 21:28)  HR: 80 (02 Apr 2022 05:43) (80 - 115)  BP: 100/56 (02 Apr 2022 05:43) (81/61 - 100/56)  BP(mean): --  RR: 18 (02 Apr 2022 05:43) (18 - 18)  SpO2: --      04-01-22 @ 07:01  -  04-02-22 @ 07:00  --------------------------------------------------------  IN: 1450 mL / OUT: 1400 mL / NET: 50 mL    04-02-22 @ 07:01  -  04-02-22 @ 13:37  --------------------------------------------------------  IN: 384 mL / OUT: 0 mL / NET: 384 mL            Consultant(s) Notes Reviewed:  [x ] YES  [ ] NO          MEDICATIONS  (STANDING):  aspirin enteric coated 81 milliGRAM(s) Oral daily  atorvastatin 80 milliGRAM(s) Oral at bedtime  budesonide 160 MICROgram(s)/formoterol 4.5 MICROgram(s) Inhaler 2 Puff(s) Inhalation two times a day  clopidogrel Tablet 75 milliGRAM(s) Oral daily  enoxaparin Injectable 40 milliGRAM(s) SubCutaneous every 24 hours  fenofibrate Tablet 145 milliGRAM(s) Oral daily  insulin lispro (ADMELOG) corrective regimen sliding scale   SubCutaneous three times a day before meals  magnesium oxide 400 milliGRAM(s) Oral three times a day with meals  metoprolol succinate ER 50 milliGRAM(s) Oral daily  omega-3-Acid Ethyl Esters 2 Gram(s) Oral two times a day  pantoprazole    Tablet 40 milliGRAM(s) Oral before breakfast  QUEtiapine 100 milliGRAM(s) Oral at bedtime  sacubitril 24 mG/valsartan 26 mG 1 Tablet(s) Oral two times a day  spironolactone 25 milliGRAM(s) Oral daily  torsemide 20 milliGRAM(s) Oral two times a day    MEDICATIONS  (PRN):  acetaminophen     Tablet .. 650 milliGRAM(s) Oral every 6 hours PRN Temp greater or equal to 38C (100.4F), Mild Pain (1 - 3)  ALBUTerol    90 MICROgram(s) HFA Inhaler 2 Puff(s) Inhalation every 6 hours PRN Shortness of Breath and/or Wheezing  albuterol/ipratropium for Nebulization 3 milliLiter(s) Nebulizer every 6 hours PRN Shortness of Breath and/or Wheezing  melatonin 3 milliGRAM(s) Oral at bedtime PRN Insomnia      LABS                          14.7   5.14  )-----------( 225      ( 02 Apr 2022 08:26 )             45.0     04-02    139  |  95<L>  |  63<HH>  ----------------------------<  124<H>  4.8   |  29  |  1.6<H>    Ca    10.0      02 Apr 2022 08:26  Mg     2.0     04-02    TPro  7.6  /  Alb  4.3  /  TBili  1.0  /  DBili  x   /  AST  16  /  ALT  8   /  AlkPhos  84  04-02          Lactate Trend        CAPILLARY BLOOD GLUCOSE      POCT Blood Glucose.: 128 mg/dL (02 Apr 2022 11:50)        RADIOLOGY & ADDITIONAL TESTS:    Imaging Personally Reviewed:  [ ] YES  [ ] NO    HEALTH ISSUES - PROBLEM Dx:          PHYSICAL EXAM:  GENERAL: NAD, well-developed.  HEAD:  Atraumatic, Normocephalic.  EYES: EOMI, PERRLA, conjunctiva and sclera clear.  NECK: Supple, elevated JVP.   CHEST/LUNG: Clear to auscultation bilaterally; No wheeze.  HEART: Regular rate and rhythm; S1 S2.   ABDOMEN: Soft, Nontender, Nondistended; Bowel sounds present.  EXTREMITIES:  2+ Peripheral Pulses, leg edema 1+  PSYCH: AAOx3.  NEUROLOGY: non-focal.  SKIN: No rashes or lesions.

## 2022-04-02 NOTE — PROGRESS NOTE ADULT - SUBJECTIVE AND OBJECTIVE BOX
WILLIAN MARY 57y Female  MRN#: [id number]   CODE STATUS:________        SUBJECTIVE    No acute events overnight. Pt afebrile and HD stable. Transitioned to PO torsemide.     OBJECTIVE  PAST MEDICAL & SURGICAL HISTORY  Hypertension    Hyperlipidemia    Anxiety and depression    COPD, severe    CHF (congestive heart failure)    Cerebrovascular accident (CVA)  Multiple    Type 2 diabetes mellitus    CKD (chronic kidney disease), stage II    No significant past surgical history                                              -----------------------------------------------------------  ALLERGIES:  No Known Allergies                                            ------------------------------------------------------------    HOME MEDICATIONS  Home Medications:  atorvastatin 80 mg oral tablet: 1 tab(s) orally once a day (at bedtime) (16 Feb 2022 13:50)  fenofibrate 145 mg oral tablet: 1 tab(s) orally once a day (16 Feb 2022 13:50)  magnesium oxide 400 mg oral tablet: 1 tab(s) orally 3 times a day (with meals) (14 Jan 2022 12:37)  pantoprazole 40 mg oral delayed release tablet: 1 tab(s) orally once a day (before a meal) (16 Nov 2021 12:10)                           MEDICATIONS:  STANDING MEDICATIONS  aspirin enteric coated 81 milliGRAM(s) Oral daily  atorvastatin 80 milliGRAM(s) Oral at bedtime  budesonide 160 MICROgram(s)/formoterol 4.5 MICROgram(s) Inhaler 2 Puff(s) Inhalation two times a day  clopidogrel Tablet 75 milliGRAM(s) Oral daily  enoxaparin Injectable 40 milliGRAM(s) SubCutaneous every 24 hours  fenofibrate Tablet 145 milliGRAM(s) Oral daily  insulin lispro (ADMELOG) corrective regimen sliding scale   SubCutaneous three times a day before meals  magnesium oxide 400 milliGRAM(s) Oral three times a day with meals  metoprolol succinate ER 50 milliGRAM(s) Oral daily  omega-3-Acid Ethyl Esters 2 Gram(s) Oral two times a day  pantoprazole    Tablet 40 milliGRAM(s) Oral before breakfast  QUEtiapine 100 milliGRAM(s) Oral at bedtime  sacubitril 24 mG/valsartan 26 mG 1 Tablet(s) Oral two times a day  spironolactone 25 milliGRAM(s) Oral daily  torsemide 20 milliGRAM(s) Oral two times a day    PRN MEDICATIONS  acetaminophen     Tablet .. 650 milliGRAM(s) Oral every 6 hours PRN  ALBUTerol    90 MICROgram(s) HFA Inhaler 2 Puff(s) Inhalation every 6 hours PRN  albuterol/ipratropium for Nebulization 3 milliLiter(s) Nebulizer every 6 hours PRN  melatonin 3 milliGRAM(s) Oral at bedtime PRN                                            ------------------------------------------------------------  VITAL SIGNS: Last 24 Hours  T(C): 36.1 (02 Apr 2022 05:43), Max: 36.2 (01 Apr 2022 21:28)  T(F): 96.9 (02 Apr 2022 05:43), Max: 97.1 (01 Apr 2022 21:28)  HR: 80 (02 Apr 2022 05:43) (80 - 115)  BP: 100/56 (02 Apr 2022 05:43) (81/61 - 114/65)  BP(mean): --  RR: 18 (02 Apr 2022 05:43) (18 - 18)  SpO2: --      04-01-22 @ 07:01  -  04-02-22 @ 07:00  --------------------------------------------------------  IN: 1450 mL / OUT: 1400 mL / NET: 50 mL    04-02-22 @ 07:01  -  04-02-22 @ 10:38  --------------------------------------------------------  IN: 384 mL / OUT: 0 mL / NET: 384 mL                                             --------------------------------------------------------------  LABS:                        14.7   5.14  )-----------( 225      ( 02 Apr 2022 08:26 )             45.0     04-02    139  |  95<L>  |  63<HH>  ----------------------------<  124<H>  4.8   |  29  |  1.6<H>    Ca    10.0      02 Apr 2022 08:26  Mg     2.0     04-02    TPro  7.6  /  Alb  4.3  /  TBili  1.0  /  DBili  x   /  AST  16  /  ALT  8   /  AlkPhos  84  04-02                                                              -------------------------------------------------------------  RADIOLOGY:                                            --------------------------------------------------------------    PHYSICAL EXAM:      General: NAD  LUNGS: crackles at lung bases  HEART: RRR, distant heart sounds  ABDOMEN: soft, nontender  EXT: 1+ pitting edema up to the knees, improved from previous  NEURO: AAOx3, CN 2-12 intact                                             --------------------------------------------------------------    ASSESSMENT & PLAN    57 year old female with PMH of COPD on 3L home O2, CORNELIUS on CPAP, HFrEF (19% on TTE 02/2022, life vest at home), pulmonary HTN, CAD s/p PCI to RCA, HTN, HLD, CVA with residual LLE weakness, DM, recently quit smoking (2 months ago) presented to the ED for worsening bilateral LE swelling.     # bilateral LE edema likely secondary to acute on chronic HFrEF exacerbation  # H/O pulmonary HTN   # H/O PAD with bilateral SFA occlusion  - Echo 02/06/2022: 19% EF, small pericardial effusion, moderate p-HTN, mod-severe MR  - Daily weight. Strict I & Os. Keep I < O. Fluid restriction 1L  - pro-BNP on admission 3425, around baseline.   - CXR performed on admission appears to show cardiomegaly with increased vascular congestion, Follow up official read   - Troponin negative on admission, repeat in AM  - No ischemic changes on EKG  - Continue metoprolol 50mg PO daily, spironolactone 50mg PO daily, entresto 97-103mg PO BID   - continue with aspirin 81mg PO daily, plavix 75mg PO daily, and atorvastatin 80mg PO daily.   - Monitor electrolytes, keep K>4 and Mg >2  - patient left life vest at home, will keep on telemetry for now  - Received Lasix 60 IV for one night  - HF evaluated patient, said to give 2mg Bumex push and start on Bumex 1mg/hr drip  - Patient kept on Bumex drip until 3/26  - Patient's edema significantly improved over 24 hours  - Both legs now improved  - EP consulted for AICD, pt says will discuss with family   - AICD on hold until patient euvolemic in any case   - Monitor UOP closely with daily weights   - Given 3 doses of mag on 3/25  - Per HF team bumex was held on 3/26 and patient started on Torsemide 20 BID from 3/27  - Patient became hypotensive on 3/27, diuretics and BP meds held (Only toprol continued)  - Had planned to restart Torsemide 20 BID in the evening of 3/28, only received 3 doses  - Per HF patient still volume overloaded on 3/29, recommended restarting Bumex drip and spironolactone and stopping torsemide  - Bumex drip stopped on 4/1, pt now Euvolemic  - Switched to torsemide 20mg BID starting 4/1 in the evening  - Restarted Entresto on 3/31  - Pt agreeable to AICD, per Dr. Ge pt cleared for AICD  - EP will see pt for AICD         # COPD on 3L home O2 - doubt active exacerbation  # CORNELIUS on CPAP   - now on 3L NC, at baseline  - Keep SpO2 88-92%.   - continue with symbicort  - duonebs PRN   - Incentive spirometry  - continue with CPAP at night     # H/O CAD s/p PCI to RCA  # H/O CVA with residual L sided weakness   # HTN/ HLD   - continue with aspirin 81mg PO daily, Plavix 75mg PO daily, Metoprolol succinate 50mg PO daily, and fenofibrate 145mg PO daily.   - Continue to hold entresto 97-103mg PO BID   - Patient had an episode of hypotension on 3/27, held all BP meds  - Will slowly resume BP meds if systolic pressure holds  - Procal obtained per HF to investigate hypotension; 0.11  - Follow up when to resume BP meds      # DM    - monitor FS, if consistently >180, start insulin protocol    # GERD   - continue with protonix     # DVT Prophylaxis: Lovenox  # Diet: DASH/TLC, CC, fluid restriction  # GI Prophylaxis: pantoprazole   # Activity: IAT  # Code Status: Full Code  # Dispo: acute

## 2022-04-03 LAB
ALBUMIN SERPL ELPH-MCNC: 4 G/DL — SIGNIFICANT CHANGE UP (ref 3.5–5.2)
ALP SERPL-CCNC: 81 U/L — SIGNIFICANT CHANGE UP (ref 30–115)
ALT FLD-CCNC: 7 U/L — SIGNIFICANT CHANGE UP (ref 0–41)
ANION GAP SERPL CALC-SCNC: 16 MMOL/L — HIGH (ref 7–14)
AST SERPL-CCNC: 14 U/L — SIGNIFICANT CHANGE UP (ref 0–41)
BASOPHILS # BLD AUTO: 0.1 K/UL — SIGNIFICANT CHANGE UP (ref 0–0.2)
BASOPHILS NFR BLD AUTO: 2 % — HIGH (ref 0–1)
BILIRUB SERPL-MCNC: 0.9 MG/DL — SIGNIFICANT CHANGE UP (ref 0.2–1.2)
BUN SERPL-MCNC: 64 MG/DL — CRITICAL HIGH (ref 10–20)
CALCIUM SERPL-MCNC: 9.6 MG/DL — SIGNIFICANT CHANGE UP (ref 8.5–10.1)
CHLORIDE SERPL-SCNC: 94 MMOL/L — LOW (ref 98–110)
CO2 SERPL-SCNC: 28 MMOL/L — SIGNIFICANT CHANGE UP (ref 17–32)
CREAT SERPL-MCNC: 1.8 MG/DL — HIGH (ref 0.7–1.5)
EGFR: 33 ML/MIN/1.73M2 — LOW
EOSINOPHIL # BLD AUTO: 0.15 K/UL — SIGNIFICANT CHANGE UP (ref 0–0.7)
EOSINOPHIL NFR BLD AUTO: 3.1 % — SIGNIFICANT CHANGE UP (ref 0–8)
GLUCOSE BLDC GLUCOMTR-MCNC: 105 MG/DL — HIGH (ref 70–99)
GLUCOSE BLDC GLUCOMTR-MCNC: 126 MG/DL — HIGH (ref 70–99)
GLUCOSE BLDC GLUCOMTR-MCNC: 130 MG/DL — HIGH (ref 70–99)
GLUCOSE BLDC GLUCOMTR-MCNC: 181 MG/DL — HIGH (ref 70–99)
GLUCOSE SERPL-MCNC: 216 MG/DL — HIGH (ref 70–99)
HCT VFR BLD CALC: 42.8 % — SIGNIFICANT CHANGE UP (ref 37–47)
HGB BLD-MCNC: 13.3 G/DL — SIGNIFICANT CHANGE UP (ref 12–16)
IMM GRANULOCYTES NFR BLD AUTO: 0.2 % — SIGNIFICANT CHANGE UP (ref 0.1–0.3)
LYMPHOCYTES # BLD AUTO: 1.47 K/UL — SIGNIFICANT CHANGE UP (ref 1.2–3.4)
LYMPHOCYTES # BLD AUTO: 30.1 % — SIGNIFICANT CHANGE UP (ref 20.5–51.1)
MAGNESIUM SERPL-MCNC: 2.1 MG/DL — SIGNIFICANT CHANGE UP (ref 1.8–2.4)
MCHC RBC-ENTMCNC: 28.8 PG — SIGNIFICANT CHANGE UP (ref 27–31)
MCHC RBC-ENTMCNC: 31.1 G/DL — LOW (ref 32–37)
MCV RBC AUTO: 92.6 FL — SIGNIFICANT CHANGE UP (ref 81–99)
MONOCYTES # BLD AUTO: 0.42 K/UL — SIGNIFICANT CHANGE UP (ref 0.1–0.6)
MONOCYTES NFR BLD AUTO: 8.6 % — SIGNIFICANT CHANGE UP (ref 1.7–9.3)
NEUTROPHILS # BLD AUTO: 2.73 K/UL — SIGNIFICANT CHANGE UP (ref 1.4–6.5)
NEUTROPHILS NFR BLD AUTO: 56 % — SIGNIFICANT CHANGE UP (ref 42.2–75.2)
NRBC # BLD: 0 /100 WBCS — SIGNIFICANT CHANGE UP (ref 0–0)
PLATELET # BLD AUTO: 206 K/UL — SIGNIFICANT CHANGE UP (ref 130–400)
POTASSIUM SERPL-MCNC: 3.9 MMOL/L — SIGNIFICANT CHANGE UP (ref 3.5–5)
POTASSIUM SERPL-SCNC: 3.9 MMOL/L — SIGNIFICANT CHANGE UP (ref 3.5–5)
PROT SERPL-MCNC: 7.1 G/DL — SIGNIFICANT CHANGE UP (ref 6–8)
RBC # BLD: 4.62 M/UL — SIGNIFICANT CHANGE UP (ref 4.2–5.4)
RBC # FLD: 16.1 % — HIGH (ref 11.5–14.5)
SARS-COV-2 RNA SPEC QL NAA+PROBE: SIGNIFICANT CHANGE UP
SODIUM SERPL-SCNC: 138 MMOL/L — SIGNIFICANT CHANGE UP (ref 135–146)
WBC # BLD: 4.88 K/UL — SIGNIFICANT CHANGE UP (ref 4.8–10.8)
WBC # FLD AUTO: 4.88 K/UL — SIGNIFICANT CHANGE UP (ref 4.8–10.8)

## 2022-04-03 PROCEDURE — 99233 SBSQ HOSP IP/OBS HIGH 50: CPT

## 2022-04-03 RX ADMIN — Medication 81 MILLIGRAM(S): at 11:51

## 2022-04-03 RX ADMIN — Medication 145 MILLIGRAM(S): at 11:51

## 2022-04-03 RX ADMIN — Medication 2 GRAM(S): at 17:28

## 2022-04-03 RX ADMIN — MAGNESIUM OXIDE 400 MG ORAL TABLET 400 MILLIGRAM(S): 241.3 TABLET ORAL at 08:12

## 2022-04-03 RX ADMIN — CLOPIDOGREL BISULFATE 75 MILLIGRAM(S): 75 TABLET, FILM COATED ORAL at 11:51

## 2022-04-03 RX ADMIN — Medication 50 MILLIGRAM(S): at 05:42

## 2022-04-03 RX ADMIN — SACUBITRIL AND VALSARTAN 1 TABLET(S): 24; 26 TABLET, FILM COATED ORAL at 05:42

## 2022-04-03 RX ADMIN — Medication 1: at 08:11

## 2022-04-03 RX ADMIN — Medication 2 GRAM(S): at 05:42

## 2022-04-03 RX ADMIN — QUETIAPINE FUMARATE 100 MILLIGRAM(S): 200 TABLET, FILM COATED ORAL at 21:58

## 2022-04-03 RX ADMIN — MAGNESIUM OXIDE 400 MG ORAL TABLET 400 MILLIGRAM(S): 241.3 TABLET ORAL at 17:28

## 2022-04-03 RX ADMIN — BUDESONIDE AND FORMOTEROL FUMARATE DIHYDRATE 2 PUFF(S): 160; 4.5 AEROSOL RESPIRATORY (INHALATION) at 19:56

## 2022-04-03 RX ADMIN — PANTOPRAZOLE SODIUM 40 MILLIGRAM(S): 20 TABLET, DELAYED RELEASE ORAL at 05:42

## 2022-04-03 RX ADMIN — SACUBITRIL AND VALSARTAN 1 TABLET(S): 24; 26 TABLET, FILM COATED ORAL at 17:29

## 2022-04-03 RX ADMIN — MAGNESIUM OXIDE 400 MG ORAL TABLET 400 MILLIGRAM(S): 241.3 TABLET ORAL at 11:51

## 2022-04-03 RX ADMIN — BUDESONIDE AND FORMOTEROL FUMARATE DIHYDRATE 2 PUFF(S): 160; 4.5 AEROSOL RESPIRATORY (INHALATION) at 08:11

## 2022-04-03 RX ADMIN — ENOXAPARIN SODIUM 40 MILLIGRAM(S): 100 INJECTION SUBCUTANEOUS at 05:42

## 2022-04-03 RX ADMIN — ATORVASTATIN CALCIUM 80 MILLIGRAM(S): 80 TABLET, FILM COATED ORAL at 21:58

## 2022-04-03 RX ADMIN — Medication 20 MILLIGRAM(S): at 17:28

## 2022-04-03 NOTE — PROGRESS NOTE ADULT - ASSESSMENT
56 yo F PMH of COPD on 3L home O2, CORNELIUS on CPAP, HFrEF (19% on TTE 02/2022, life vest at home), pulmonary HTN, CAD s/p PCI to RCA, HTN, HLD, CVA with residual Left LE weakness, DM, recently quit smoking (2 months ago) presented to the ED for worsening bilateral LE swelling.     A/P:   Acute on Chronic HFrEF  Frequent NSVT  SOB, leg edema, Pro-BNP 3458  ECHO 3/23/22: EF improved at 30-35%, mod MR and TR  s/p Bumex drip. Start on Torsemide 20mg daily.   Continue Metoprolol, Aldactone. Resume Entresto.   EP follow up for AICD on this admission    CAD s/p PCI  continue aspirin, Plavix, statin, metoprolol    HTN  controlled, continue Metoprolol,     CVA  mild residual left side weakness.   Continue ASA, Plavix and Lipitor.     COPD on home O2 - on nebs, Symbicort    CORNELIUS on CPAP qhs    DM type 2:   A1C Feb 2022 was 6.8,   FS is controlled    DVT PPX:  Lovenox    Progress Note Handoff  Pending (specify): pending ICD placement.   Family discussion:   Disposition: home with services

## 2022-04-03 NOTE — PROGRESS NOTE ADULT - SUBJECTIVE AND OBJECTIVE BOX
WILLIAN MARY  57y  Female      Patient is a 57y old  Female who presents with a chief complaint of CHF (01 Apr 2022 13:19)      INTERVAL HPI/OVERNIGHT EVENTS:  No new complaints, no chest pain or SOB.   Vital Signs Last 24 Hrs  T(C): 35.9 (03 Apr 2022 10:06), Max: 36.3 (03 Apr 2022 05:16)  T(F): 96.6 (03 Apr 2022 10:06), Max: 97.4 (03 Apr 2022 05:16)  HR: 105 (03 Apr 2022 17:01) (76 - 112)  BP: 133/60 (03 Apr 2022 17:01) (85/60 - 133/60)  BP(mean): --  RR: 18 (03 Apr 2022 05:16) (18 - 20)  SpO2: --      04-02-22 @ 07:01  -  04-03-22 @ 07:00  --------------------------------------------------------  IN: 1097 mL / OUT: 1225 mL / NET: -128 mL    04-03-22 @ 07:01  -  04-03-22 @ 17:55  --------------------------------------------------------  IN: 1005 mL / OUT: 0 mL / NET: 1005 mL            Consultant(s) Notes Reviewed:  [x ] YES  [ ] NO          MEDICATIONS  (STANDING):  aspirin enteric coated 81 milliGRAM(s) Oral daily  atorvastatin 80 milliGRAM(s) Oral at bedtime  budesonide 160 MICROgram(s)/formoterol 4.5 MICROgram(s) Inhaler 2 Puff(s) Inhalation two times a day  clopidogrel Tablet 75 milliGRAM(s) Oral daily  enoxaparin Injectable 40 milliGRAM(s) SubCutaneous every 24 hours  fenofibrate Tablet 145 milliGRAM(s) Oral daily  insulin lispro (ADMELOG) corrective regimen sliding scale   SubCutaneous three times a day before meals  magnesium oxide 400 milliGRAM(s) Oral three times a day with meals  metoprolol succinate ER 50 milliGRAM(s) Oral daily  omega-3-Acid Ethyl Esters 2 Gram(s) Oral two times a day  pantoprazole    Tablet 40 milliGRAM(s) Oral before breakfast  QUEtiapine 100 milliGRAM(s) Oral at bedtime  sacubitril 24 mG/valsartan 26 mG 1 Tablet(s) Oral two times a day  spironolactone 25 milliGRAM(s) Oral daily  torsemide 20 milliGRAM(s) Oral two times a day    MEDICATIONS  (PRN):  acetaminophen     Tablet .. 650 milliGRAM(s) Oral every 6 hours PRN Temp greater or equal to 38C (100.4F), Mild Pain (1 - 3)  ALBUTerol    90 MICROgram(s) HFA Inhaler 2 Puff(s) Inhalation every 6 hours PRN Shortness of Breath and/or Wheezing  albuterol/ipratropium for Nebulization 3 milliLiter(s) Nebulizer every 6 hours PRN Shortness of Breath and/or Wheezing  melatonin 3 milliGRAM(s) Oral at bedtime PRN Insomnia      LABS                          13.3   4.88  )-----------( 206      ( 03 Apr 2022 04:30 )             42.8     04-03    138  |  94<L>  |  64<HH>  ----------------------------<  216<H>  3.9   |  28  |  1.8<H>    Ca    9.6      03 Apr 2022 04:30  Mg     2.1     04-03    TPro  7.1  /  Alb  4.0  /  TBili  0.9  /  DBili  x   /  AST  14  /  ALT  7   /  AlkPhos  81  04-03          Lactate Trend        CAPILLARY BLOOD GLUCOSE      POCT Blood Glucose.: 130 mg/dL (03 Apr 2022 16:36)        RADIOLOGY & ADDITIONAL TESTS:    Imaging Personally Reviewed:  [ ] YES  [ ] NO    HEALTH ISSUES - PROBLEM Dx:          PHYSICAL EXAM:  GENERAL: NAD, well-developed.  HEAD:  Atraumatic, Normocephalic.  EYES: EOMI, PERRLA, conjunctiva and sclera clear.  NECK: Supple, elevated JVP.   CHEST/LUNG: Clear to auscultation bilaterally; No wheeze.  HEART: Regular rate and rhythm; S1 S2.   ABDOMEN: Soft, Nontender, Nondistended; Bowel sounds present.  EXTREMITIES:  2+ Peripheral Pulses, leg edema 1+  PSYCH: AAOx3.  NEUROLOGY: non-focal.  SKIN: No rashes or lesions.

## 2022-04-04 LAB
ALBUMIN SERPL ELPH-MCNC: 4.2 G/DL — SIGNIFICANT CHANGE UP (ref 3.5–5.2)
ALP SERPL-CCNC: 81 U/L — SIGNIFICANT CHANGE UP (ref 30–115)
ALT FLD-CCNC: 8 U/L — SIGNIFICANT CHANGE UP (ref 0–41)
ANION GAP SERPL CALC-SCNC: 18 MMOL/L — HIGH (ref 7–14)
AST SERPL-CCNC: 15 U/L — SIGNIFICANT CHANGE UP (ref 0–41)
BASOPHILS # BLD AUTO: 0.1 K/UL — SIGNIFICANT CHANGE UP (ref 0–0.2)
BASOPHILS NFR BLD AUTO: 2 % — HIGH (ref 0–1)
BILIRUB SERPL-MCNC: 0.9 MG/DL — SIGNIFICANT CHANGE UP (ref 0.2–1.2)
BUN SERPL-MCNC: 65 MG/DL — CRITICAL HIGH (ref 10–20)
CALCIUM SERPL-MCNC: 10.2 MG/DL — HIGH (ref 8.5–10.1)
CHLORIDE SERPL-SCNC: 94 MMOL/L — LOW (ref 98–110)
CO2 SERPL-SCNC: 27 MMOL/L — SIGNIFICANT CHANGE UP (ref 17–32)
CREAT SERPL-MCNC: 1.5 MG/DL — SIGNIFICANT CHANGE UP (ref 0.7–1.5)
EGFR: 41 ML/MIN/1.73M2 — LOW
EOSINOPHIL # BLD AUTO: 0.12 K/UL — SIGNIFICANT CHANGE UP (ref 0–0.7)
EOSINOPHIL NFR BLD AUTO: 2.4 % — SIGNIFICANT CHANGE UP (ref 0–8)
GLUCOSE BLDC GLUCOMTR-MCNC: 102 MG/DL — HIGH (ref 70–99)
GLUCOSE BLDC GLUCOMTR-MCNC: 126 MG/DL — HIGH (ref 70–99)
GLUCOSE BLDC GLUCOMTR-MCNC: 145 MG/DL — HIGH (ref 70–99)
GLUCOSE BLDC GLUCOMTR-MCNC: 145 MG/DL — HIGH (ref 70–99)
GLUCOSE SERPL-MCNC: 176 MG/DL — HIGH (ref 70–99)
HCT VFR BLD CALC: 44.8 % — SIGNIFICANT CHANGE UP (ref 37–47)
HGB BLD-MCNC: 14.4 G/DL — SIGNIFICANT CHANGE UP (ref 12–16)
IMM GRANULOCYTES NFR BLD AUTO: 0.4 % — HIGH (ref 0.1–0.3)
LYMPHOCYTES # BLD AUTO: 1.8 K/UL — SIGNIFICANT CHANGE UP (ref 1.2–3.4)
LYMPHOCYTES # BLD AUTO: 35.6 % — SIGNIFICANT CHANGE UP (ref 20.5–51.1)
MAGNESIUM SERPL-MCNC: 2.4 MG/DL — SIGNIFICANT CHANGE UP (ref 1.8–2.4)
MCHC RBC-ENTMCNC: 29.5 PG — SIGNIFICANT CHANGE UP (ref 27–31)
MCHC RBC-ENTMCNC: 32.1 G/DL — SIGNIFICANT CHANGE UP (ref 32–37)
MCV RBC AUTO: 91.8 FL — SIGNIFICANT CHANGE UP (ref 81–99)
MONOCYTES # BLD AUTO: 0.54 K/UL — SIGNIFICANT CHANGE UP (ref 0.1–0.6)
MONOCYTES NFR BLD AUTO: 10.7 % — HIGH (ref 1.7–9.3)
NEUTROPHILS # BLD AUTO: 2.48 K/UL — SIGNIFICANT CHANGE UP (ref 1.4–6.5)
NEUTROPHILS NFR BLD AUTO: 48.9 % — SIGNIFICANT CHANGE UP (ref 42.2–75.2)
NRBC # BLD: 0 /100 WBCS — SIGNIFICANT CHANGE UP (ref 0–0)
PLATELET # BLD AUTO: 185 K/UL — SIGNIFICANT CHANGE UP (ref 130–400)
POTASSIUM SERPL-MCNC: 4.7 MMOL/L — SIGNIFICANT CHANGE UP (ref 3.5–5)
POTASSIUM SERPL-SCNC: 4.7 MMOL/L — SIGNIFICANT CHANGE UP (ref 3.5–5)
PROT SERPL-MCNC: 7.5 G/DL — SIGNIFICANT CHANGE UP (ref 6–8)
RBC # BLD: 4.88 M/UL — SIGNIFICANT CHANGE UP (ref 4.2–5.4)
RBC # FLD: 16.1 % — HIGH (ref 11.5–14.5)
SODIUM SERPL-SCNC: 139 MMOL/L — SIGNIFICANT CHANGE UP (ref 135–146)
WBC # BLD: 5.06 K/UL — SIGNIFICANT CHANGE UP (ref 4.8–10.8)
WBC # FLD AUTO: 5.06 K/UL — SIGNIFICANT CHANGE UP (ref 4.8–10.8)

## 2022-04-04 PROCEDURE — 99233 SBSQ HOSP IP/OBS HIGH 50: CPT

## 2022-04-04 PROCEDURE — 99232 SBSQ HOSP IP/OBS MODERATE 35: CPT

## 2022-04-04 RX ORDER — SODIUM CHLORIDE 5 G/100ML
150 INJECTION, SOLUTION INTRAVENOUS
Refills: 0 | Status: COMPLETED | OUTPATIENT
Start: 2022-04-04 | End: 2022-04-04

## 2022-04-04 RX ORDER — BUMETANIDE 0.25 MG/ML
1 INJECTION INTRAMUSCULAR; INTRAVENOUS
Qty: 20 | Refills: 0 | Status: DISCONTINUED | OUTPATIENT
Start: 2022-04-04 | End: 2022-04-05

## 2022-04-04 RX ORDER — BUMETANIDE 0.25 MG/ML
2 INJECTION INTRAMUSCULAR; INTRAVENOUS ONCE
Refills: 0 | Status: COMPLETED | OUTPATIENT
Start: 2022-04-04 | End: 2022-04-04

## 2022-04-04 RX ADMIN — ATORVASTATIN CALCIUM 80 MILLIGRAM(S): 80 TABLET, FILM COATED ORAL at 21:42

## 2022-04-04 RX ADMIN — ENOXAPARIN SODIUM 40 MILLIGRAM(S): 100 INJECTION SUBCUTANEOUS at 05:37

## 2022-04-04 RX ADMIN — BUMETANIDE 2 MILLIGRAM(S): 0.25 INJECTION INTRAMUSCULAR; INTRAVENOUS at 17:18

## 2022-04-04 RX ADMIN — QUETIAPINE FUMARATE 100 MILLIGRAM(S): 200 TABLET, FILM COATED ORAL at 21:42

## 2022-04-04 RX ADMIN — MAGNESIUM OXIDE 400 MG ORAL TABLET 400 MILLIGRAM(S): 241.3 TABLET ORAL at 08:15

## 2022-04-04 RX ADMIN — SODIUM CHLORIDE 50 MILLILITER(S): 5 INJECTION, SOLUTION INTRAVENOUS at 18:36

## 2022-04-04 RX ADMIN — SPIRONOLACTONE 25 MILLIGRAM(S): 25 TABLET, FILM COATED ORAL at 08:15

## 2022-04-04 RX ADMIN — Medication 81 MILLIGRAM(S): at 11:24

## 2022-04-04 RX ADMIN — MAGNESIUM OXIDE 400 MG ORAL TABLET 400 MILLIGRAM(S): 241.3 TABLET ORAL at 17:16

## 2022-04-04 RX ADMIN — MAGNESIUM OXIDE 400 MG ORAL TABLET 400 MILLIGRAM(S): 241.3 TABLET ORAL at 11:25

## 2022-04-04 RX ADMIN — BUMETANIDE 5 MG/HR: 0.25 INJECTION INTRAMUSCULAR; INTRAVENOUS at 17:21

## 2022-04-04 RX ADMIN — BUDESONIDE AND FORMOTEROL FUMARATE DIHYDRATE 2 PUFF(S): 160; 4.5 AEROSOL RESPIRATORY (INHALATION) at 08:14

## 2022-04-04 RX ADMIN — Medication 2 GRAM(S): at 05:37

## 2022-04-04 RX ADMIN — Medication 2 GRAM(S): at 17:17

## 2022-04-04 RX ADMIN — Medication 50 MILLIGRAM(S): at 08:15

## 2022-04-04 RX ADMIN — CLOPIDOGREL BISULFATE 75 MILLIGRAM(S): 75 TABLET, FILM COATED ORAL at 11:24

## 2022-04-04 RX ADMIN — SACUBITRIL AND VALSARTAN 1 TABLET(S): 24; 26 TABLET, FILM COATED ORAL at 08:15

## 2022-04-04 RX ADMIN — Medication 145 MILLIGRAM(S): at 11:24

## 2022-04-04 RX ADMIN — SACUBITRIL AND VALSARTAN 1 TABLET(S): 24; 26 TABLET, FILM COATED ORAL at 17:16

## 2022-04-04 RX ADMIN — Medication 20 MILLIGRAM(S): at 08:14

## 2022-04-04 RX ADMIN — PANTOPRAZOLE SODIUM 40 MILLIGRAM(S): 20 TABLET, DELAYED RELEASE ORAL at 05:39

## 2022-04-04 NOTE — PROGRESS NOTE ADULT - ASSESSMENT
Acute on chronic systolic HF / Fluid overload / pulmonary HTN / COPD / DM2 / Anxiety     Patient is close to euvolemic on exam- POCUS exam: IVC 2.4cm with slight respiratory variations    Give 2mg IVP of Bumex then start gtt @ 1mg/hr x5 hours (give push 30 mins after starting hypertonic saline)  Give 3% Hypertonic Saline 150ml x1 (If infused throughout a central line, run over one hour, if a peripheral line is to be used run over 3 hours)  Tomorrow, resume Torsemide 20mg BID   Continue Spironolactone 25mg daily   Continue Entresto 24/26 mg BID  Continue metoprolol XL 50 mg daily   Get BMP daily  Maintain potassium 4.0-5.0, Mg >2.2  Strict intake and output  Daily weight   Physical therapy f/u  Encourage incentive spirometer use  EP team f/u for AICD   Plan discussed with primary team  Will continue to follow   Acute on chronic systolic HF / Fluid overload / pulmonary HTN / COPD / DM2 / Anxiety     Patient is close to euvolemic on exam- POCUS exam: IVC 2.4cm with slight respiratory variations    Give 2mg IVP of Bumex then start gtt @ 1mg/hr x5 hours (give push 30 mins after starting hypertonic saline)  Give 3% Hypertonic Saline 150ml x1 (If infused throughout a central line, run over one hour, if a peripheral line is to be used run over 3 hours)  Hold diuretics tomorrow 4/5, will re-assess  Continue Spironolactone 25mg daily   Continue Entresto 24/26 mg BID  Continue metoprolol XL 50 mg daily   Get BMP daily  Maintain potassium 4.0-5.0, Mg >2.2  Strict intake and output  Daily weight   Physical therapy f/u  Encourage incentive spirometer use  EP team f/u for AICD   Plan discussed with primary team  Will continue to follow   Acute on chronic systolic HF / Fluid overload / pulmonary HTN / COPD / DM2 / Anxiety     Patient is close to euvolemic on exam- POCUS exam: IVC 2.4cm with slight respiratory variations    Give 2mg IVP of Bumex then start gtt @ 1mg/hr x5 hours (give push 30 mins after starting hypertonic saline)  Give 3% Hypertonic Saline 150ml x1 (If infused throughout a central line, run over one hour, if a peripheral line is to be used run over 3 hours)  Hold diuretics tomorrow 4/5, will re-assess  Continue Spironolactone 25mg daily   Continue Entresto 24/26 mg BID  Continue metoprolol XL 50 mg daily   Get BMP daily  Maintain potassium 4.0-5.0, Mg >2.2  Strict intake and output  Daily weight   Physical therapy f/u  Encourage incentive spirometer use  EP team f/u for AICD   Plan discussed with primary team  Will continue to follow.

## 2022-04-04 NOTE — PROGRESS NOTE ADULT - SUBJECTIVE AND OBJECTIVE BOX
WILLIAN MARY 57y Female  MRN#: [id number]   CODE STATUS: Full     Hospital Day: 14d    Pt is currently admitted with the primary diagnosis of Acute on Chronic CHF exacerbation     SUBJECTIVE  Hospital Course: The patient is clinically and hemodynamically stable. ICD placement likely to be performed on friday pending HF clearance for procedure.     Overnight events: No acute overnight events     Subjective complaints: No subjective complaints      Present Today:   - Wilkerson:  No [X], Yes [   ] : Indication:     - Type of IV Access:       .. CVC/Piccline:  No [X], Yes [   ] : Indication:       .. Midline: No [X], Yes [   ] : Indication:                                             ----------------------------------------------------------  OBJECTIVE  PAST MEDICAL & SURGICAL HISTORY  Hypertension    Hyperlipidemia    Anxiety and depression    COPD, severe    CHF (congestive heart failure)    Cerebrovascular accident (CVA)  Multiple    Type 2 diabetes mellitus    CKD (chronic kidney disease), stage II    No significant past surgical history                                              -----------------------------------------------------------  ALLERGIES:  No Known Allergies                                            ------------------------------------------------------------    HOME MEDICATIONS  Home Medications:  atorvastatin 80 mg oral tablet: 1 tab(s) orally once a day (at bedtime) (16 Feb 2022 13:50)  fenofibrate 145 mg oral tablet: 1 tab(s) orally once a day (16 Feb 2022 13:50)  magnesium oxide 400 mg oral tablet: 1 tab(s) orally 3 times a day (with meals) (14 Jan 2022 12:37)  pantoprazole 40 mg oral delayed release tablet: 1 tab(s) orally once a day (before a meal) (16 Nov 2021 12:10)                           MEDICATIONS:  STANDING MEDICATIONS  aspirin enteric coated 81 milliGRAM(s) Oral daily  atorvastatin 80 milliGRAM(s) Oral at bedtime  budesonide 160 MICROgram(s)/formoterol 4.5 MICROgram(s) Inhaler 2 Puff(s) Inhalation two times a day  clopidogrel Tablet 75 milliGRAM(s) Oral daily  enoxaparin Injectable 40 milliGRAM(s) SubCutaneous every 24 hours  fenofibrate Tablet 145 milliGRAM(s) Oral daily  insulin lispro (ADMELOG) corrective regimen sliding scale   SubCutaneous three times a day before meals  magnesium oxide 400 milliGRAM(s) Oral three times a day with meals  metoprolol succinate ER 50 milliGRAM(s) Oral daily  omega-3-Acid Ethyl Esters 2 Gram(s) Oral two times a day  pantoprazole    Tablet 40 milliGRAM(s) Oral before breakfast  QUEtiapine 100 milliGRAM(s) Oral at bedtime  sacubitril 24 mG/valsartan 26 mG 1 Tablet(s) Oral two times a day  spironolactone 25 milliGRAM(s) Oral daily  torsemide 20 milliGRAM(s) Oral two times a day    PRN MEDICATIONS  acetaminophen     Tablet .. 650 milliGRAM(s) Oral every 6 hours PRN  ALBUTerol    90 MICROgram(s) HFA Inhaler 2 Puff(s) Inhalation every 6 hours PRN  albuterol/ipratropium for Nebulization 3 milliLiter(s) Nebulizer every 6 hours PRN  melatonin 3 milliGRAM(s) Oral at bedtime PRN                                            ------------------------------------------------------------  VITAL SIGNS: Last 24 Hours  T(C): 35.8 (04 Apr 2022 04:46), Max: 36.2 (03 Apr 2022 20:50)  T(F): 96.4 (04 Apr 2022 04:46), Max: 97.1 (03 Apr 2022 20:50)  HR: 113 (04 Apr 2022 08:03) (69 - 113)  BP: 101/68 (04 Apr 2022 08:03) (93/55 - 133/60)  BP(mean): --  RR: 18 (04 Apr 2022 04:46) (18 - 18)  SpO2: 100% (04 Apr 2022 08:03) (94% - 100%)      04-03-22 @ 07:01  -  04-04-22 @ 07:00  --------------------------------------------------------  IN: 1155 mL / OUT: 1551 mL / NET: -396 mL    04-04-22 @ 07:01  -  04-04-22 @ 11:08  --------------------------------------------------------  IN: 236 mL / OUT: 0 mL / NET: 236 mL                                             --------------------------------------------------------------  LABS:                        14.4   5.06  )-----------( 185      ( 04 Apr 2022 04:30 )             44.8     04-04    139  |  94<L>  |  65<HH>  ----------------------------<  176<H>  4.7   |  27  |  1.5    Ca    10.2<H>      04 Apr 2022 04:30  Mg     2.4     04-04    TPro  7.5  /  Alb  4.2  /  TBili  0.9  /  DBili  x   /  AST  15  /  ALT  8   /  AlkPhos  81  04-04                                            -------------------------------------------------------------  RADIOLOGY:  No new imaging                                           --------------------------------------------------------------    PHYSICAL EXAM:  GENERAL: NAD, well-developed.  HEAD:  Atraumatic, Normocephalic.  EYES: EOMI, PERRLA, conjunctiva and sclera clear.  NECK: Supple, elevated JVP.   CHEST/LUNG: Clear to auscultation bilaterally; No wheeze.  HEART: Regular rate and rhythm; S1 S2.   ABDOMEN: Soft, Nontender, Nondistended; Bowel sounds present.  EXTREMITIES:  2+ Peripheral Pulses, leg edema 1+  PSYCH: AAOx3.  NEUROLOGY: non-focal.  SKIN: No rashes or lesions.                                         --------------------------------------------------------------    ASSESSMENT & PLAN  57 year old female with PMH of COPD on 3L home O2, CORNELIUS on CPAP, HFrEF (19% on TTE 02/2022, life vest at home), pulmonary HTN, CAD s/p PCI to RCA, HTN, HLD, CVA with residual LLE weakness, DM, recently quit smoking (2 months ago) presented to the ED for worsening bilateral LE swelling.     # bilateral LE edema likely secondary to acute on chronic HFrEF exacerbation  # H/O pulmonary HTN   # H/O PAD with bilateral SFA occlusion  - Echo 02/06/2022: 19% EF, small pericardial effusion, moderate p-HTN, mod-severe MR  - Daily weight. Strict I & Os. Keep I < O. Fluid restriction 1L  - pro-BNP on admission 3425, around baseline.   - CXR performed on admission appears to show cardiomegaly with increased vascular congestion, Follow up official read   - Troponin negative on admission, repeat in AM  - No ischemic changes on EKG  - Continue metoprolol 50mg PO daily, spironolactone 50mg PO daily, entresto 97-103mg PO BID   - continue with aspirin 81mg PO daily, plavix 75mg PO daily, and atorvastatin 80mg PO daily.   - Monitor electrolytes, keep K>4 and Mg >2  - patient left life vest at home, will keep on telemetry for now  - Received Lasix 60 IV for one night  - HF evaluated patient, said to give 2mg Bumex push and start on Bumex 1mg/hr drip  - Patient kept on Bumex drip until 3/26  - Patient's edema significantly improved over 24 hours  - Both legs now improved  - EP consulted for AICD, pt says will discuss with family   - AICD on hold until patient euvolemic in any case   - Monitor UOP closely with daily weights   - Given 3 doses of mag on 3/25  - Per HF team bumex was held on 3/26 and patient started on Torsemide 20 BID from 3/27  - Patient became hypotensive on 3/27, diuretics and BP meds held (Only toprol continued)  - Had planned to restart Torsemide 20 BID in the evening of 3/28, only received 3 doses  - Per HF patient still volume overloaded on 3/29, recommended restarting Bumex drip and spironolactone and stopping torsemide  - Bumex drip stopped on 4/1, pt now Euvolemic  - Switched to torsemide 20mg BID starting 4/1 in the evening  - Restarted Entresto on 3/31  - Pt agreeable to AICD, per Dr. Ge pt cleared for AICD  - EP will see pt for AICD   - AICD on Friday after clearance by Joo     # COPD on 3L home O2 - doubt active exacerbation  # CORNELIUS on CPAP   - now on 3L NC, at baseline  - Keep SpO2 88-92%.   - continue with symbicort  - duonebs PRN   - Incentive spirometry  - continue with CPAP at night     # H/O CAD s/p PCI to RCA  # H/O CVA with residual L sided weakness   # HTN/ HLD   - continue with aspirin 81mg PO daily, Plavix 75mg PO daily, Metoprolol succinate 50mg PO daily, and fenofibrate 145mg PO daily.   - Continue to hold entresto 97-103mg PO BID   - Patient had an episode of hypotension on 3/27, held all BP meds  - Will slowly resume BP meds if systolic pressure holds  - Procal obtained per HF to investigate hypotension; 0.11  - Follow up when to resume BP meds    # DM    - monitor FS, if consistently >180, start insulin protocol    # GERD   - continue with protonix     # DVT Prophylaxis: Lovenox  # Diet: DASH/TLC, CC, fluid restriction  # GI Prophylaxis: pantoprazole   # Activity: IAT  # Code Status: Full Code  # Dispo: acute

## 2022-04-04 NOTE — PROGRESS NOTE ADULT - ASSESSMENT
56 yo F PMH of COPD on 3L home O2, CORNELIUS on CPAP, HFrEF (19% on TTE 02/2022, life vest at home), pulmonary HTN, CAD s/p PCI to RCA, HTN, HLD, CVA with residual Left LE weakness, DM, recently quit smoking (2 months ago) presented to the ED for worsening bilateral LE swelling.     A/P:   Acute on Chronic HFrEF  Frequent NSVT  SOB, leg edema, Pro-BNP 3458  ECHO 3/23/22: EF improved at 30-35%, mod MR and TR  Bumex drip x 5hrs and Hypertonic saline today   Start on Torsemide 20mg daily beginning tomorrow   Continue Metoprolol, Aldactone. Resume Entresto.   AICP Implantation 4/5th by EP   NPO past MN today     CAD s/p PCI  continue aspirin, Plavix, statin, metoprolol    HTN  controlled, continue Metoprolol,     CVA  mild residual left side weakness.   Continue ASA, Plavix and Lipitor.     COPD on home O2 - on nebs, Symbicort    CORNELIUS on CPAP qhs    DM type 2:   A1C Feb 2022 was 6.8,   FS is controlled    DVT PPX:  Lovenox    Progress Note Handoff  Pending (specify): pending ICD placement.     Family discussion: Pt takes care of own self aware AICP tomorrow and she wants to go early for procedure if possible     Disposition: home with services post AICD / Prepare for d/c

## 2022-04-04 NOTE — PROGRESS NOTE ADULT - SUBJECTIVE AND OBJECTIVE BOX
Date of Admission: 3/21/22    Interval History:    - Patient assessed resting in bed, in NAD  - States she feels well and is eating/drinking normally, denies SOB    Chief Complaint: Patient is a 57y old  Female who presents with a chief complaint of B/L edema    HISTORY OF PRESENT ILLNESS: 57 year old female with PMH of COPD on 3L home O2, CORNELIUS on CPAP, HFrEF (19% on TTE 2022, life vest at home), pulmonary HTN, CAD s/p PCI to RCA, HTN, HLD, CVA with residual LLE weakness, DM, recently quit smoking (2 months ago) presented to the ED for worsening bilateral LE swelling. Patient had noted that for the past week prior to admission, her legs began to have progressively worsening swelling and are now tender. The day of admission, patient was in the bath and complained to the HHA who called 911, so she arrived in the ED without her life vest. Patient was discharged from the hospital on  after being diuresed for acute on chronic HFrEF exacerbation. She states that she has been compliant with her diet and medications, denies any NSAID use. She was not able to follow up with any physicians since her last discharge. Otherwise denies any fevers, chills, chest pain, cough, SOB, or urinary symptoms.   In the ED, HR was 104, vitals otherwise stable. Lab work was remarkable for pro-BNP 3452. Given Lasix 40mg IV in the ED and admitted to medicine for further work up and management.  (22 Mar 2022 00:01)      PAST MEDICAL & SURGICAL HISTORY:  Hypertension  Hyperlipidemia  Anxiety and depression  COPD, severe  CHF (congestive heart failure)  Cerebrovascular accident (CVA)Multiple  Type 2 diabetes mellitus  CKD (chronic kidney disease), stage II  No significant past surgical history        FAMILY HISTORY:  No pertinent family history of premature cardiovascular disease in first degree relatives.  Mother:  of cancer at 65  Father:  of an overdose at 50    SOCIAL HISTORY: Former smoker- states she quit since her last admission a month ago. Denies alcohol or drug use.       Allergies  No Known Allergies    Intolerances    PHYSICAL EXAM:  General Appearance: well appearing, normal for age and gender. 	  Neck: unable to assess due to body habitus  Cardiovascular: regular rate and rhythm S1 S2, No murmurs, +1 B/L LE edema  Respiratory: anterior lung fields clear  Psychiatry: Alert and oriented x 3, Mood & affect appropriate  Gastrointestinal:  Soft, Non-tender  Skin/Integumentary : No rashes, No ecchymoses, No cyanosis	  Neurologic: Non-focal  Musculoskeletal/ extremities: Normal range of motion, No clubbing, cyanosis   Vascular: Peripheral pulses palpable 2+ bilaterally    CARDIAC MARKERS:  Serum Pro-Brain Natriuretic Peptide: 4892 pg/mL (22 @ 10:50)      TELEMETRY EVENTS: 	    ECG:  	3/21/22      Ventricular Rate 103 BPM  Atrial Rate 103 BPM  P-R Interval 172 ms  QRS Duration 152 ms  Q-T Interval 390 ms  QTC Calculation(Bazett) 510 ms  P Axis 91 degrees  R Axis 196 degrees  T Axis 33 degrees    Diagnosis Line *** Suspect arm lead reversal, interpretation assumes no reversal  Sinus tachycardia  Possible Left atrial enlargement  Non-specific intra-ventricular conduction block  Lateral infarct , age undetermined  Abnormal ECG    Confirmed by CARLOTA GAUTHIER MD ([id number]) on 3/22/2022 9:01:55 AM          PREVIOUS DIAGNOSTIC TESTING:      TTE 22    Summary:   1. LV Ejection Fraction by Urena's Method with a biplane EF of 19 %.   2. Severely decreased global left ventricular systolic function.   3. Dilated RV with moderately reduced systolic function.   4. Moderately enlarged left atrium.   5. Severely enlarged right atrium.   6. Sclerotic aorti cvalve with normal opening.   7. Mild aortic regurgitation.   8. The mitral valve leaflets are tethered due to reduced systolic   function and elevated LVDP.   9. Moderate-to-severe mitral regurgitation.  10. Severe tricuspid regurgitation.  11. Estimated pulmonary artery systolic pressure is 53.7 mmHg assuming a   right atrial pressure of 15 mmHg, which is consistent with moderate   pulmonary hypertension.  12. Small pericardial effusion adjacent to the right atrium.        TTE 21    Summary:   1. Left ventricular ejection fraction, by visual estimation, is 35 to 40%.   2.Moderately decreased global left ventricular systolic function.   3. Multiple left ventricular regional wall motion abnormalities exist. See wall motion findings.   4. Elevated left atrial and left ventricular end-diastolic pressures.   5. Mild concentric left ventricular hypertrophy.   6. Mildly increased LV wall thickness.   7. Normal left ventricular internal cavity size.   8. Spectral Doppler shows restrictive pattern of left ventricular myocardial filling (Grade III diastolic dysfunction).  9. Moderately reduced RV systolic function.  10. Mildly enlarged left atrium.  11. Moderately enlarged right atrium.  12. Small pericardial effusion.  13. Mild to moderate mitral valve regurgitation.  14. Moderate-severe tricuspid regurgitation.  15.Mild aortic regurgitation.  16. Sclerotic aortic valve with normal opening.  17. Estimated pulmonary artery systolic pressure is 50.0 mmHg assuming a right atrial pressure of 15 mmHg, which is consistent with moderate pulmonary hypertension.  18. Pulmonary hypertension is present.  19. LA volume Index is 36.7 ml/m² ml/m2.    	    Home Medications:  Albuterol (Eqv-ProAir HFA) 90 mcg/inh inhalation aerosol: 2 puff(s) inhaled every 6 hours, As Needed (2021 11:22)  aspirin 81 mg oral tablet: 1 tab(s) orally once a day (2021 11:22)  fenofibrate 145 mg oral tablet: 1 tab(s) orally once a day (2021 11:22)  glipiZIDE 10 mg oral tablet: 1 tab(s) orally 2 times a day (2021 11:22)  Lovaza 1000 mg oral capsule: 2 cap(s) orally 2 times a day (2021 14:52)  metFORMIN 1000 mg oral tablet: 1 tab(s) orally 2 times a day Do not take until  (2021 12:58)  Plavix 75 mg oral tablet: 1 tab(s) orally once a day (2021 11:22)  Vitamin D2 1.25 mg (50,000 intl units) oral capsule: 1 cap(s) orally once a week (2021 14:52)    MEDICATIONS  (STANDING):  aspirin  chewable 81 milliGRAM(s) Oral daily  atorvastatin 80 milliGRAM(s) Oral at bedtime  budesonide 160 MICROgram(s)/formoterol 4.5 MICROgram(s) Inhaler 2 Puff(s) Inhalation two times a day  chlorhexidine 4% Liquid 1 Application(s) Topical <User Schedule>  clopidogrel Tablet 75 milliGRAM(s) Oral daily  enoxaparin Injectable 40 milliGRAM(s) SubCutaneous daily  fenofibrate Tablet 145 milliGRAM(s) Oral daily  furosemide   Injectable 40 milliGRAM(s) IV Push two times a day  magnesium sulfate  IVPB 2 Gram(s) IV Intermittent once  metoprolol succinate ER 50 milliGRAM(s) Oral daily  omega-3-Acid Ethyl Esters 2 Gram(s) Oral two times a day  pantoprazole    Tablet 40 milliGRAM(s) Oral before breakfast  sacubitril 49 mG/valsartan 51 mG 1 Tablet(s) Oral two times a day    MEDICATIONS  (PRN):  ALBUTerol    90 MICROgram(s) HFA Inhaler 2 Puff(s) Inhalation every 6 hours PRN Shortness of Breath and/or Wheezing

## 2022-04-04 NOTE — PROGRESS NOTE ADULT - SUBJECTIVE AND OBJECTIVE BOX
WILLIAN MARY  57y  Female      Patient is a 57y old  Female who presents with a chief complaint of CHF (01 Apr 2022 13:19)      INTERVAL HPI/OVERNIGHT EVENTS:  No new complaints, no chest pain or SOB.     Vital Signs Last 24 Hrs  T(C): 35.7 (04 Apr 2022 12:21), Max: 36.2 (03 Apr 2022 20:50)  T(F): 96.2 (04 Apr 2022 12:21), Max: 97.1 (03 Apr 2022 20:50)  HR: 107 (04 Apr 2022 16:30) (69 - 113)  BP: 100/68 (04 Apr 2022 16:30) (93/55 - 133/60)  RR: 18 (04 Apr 2022 12:21) (18 - 18)  SpO2: 100% (04 Apr 2022 08:03) (94% - 100%)    04-02-22 @ 07:01  -  04-03-22 @ 07:00  --------------------------------------------------------  IN: 1097 mL / OUT: 1225 mL / NET: -128 mL    04-03-22 @ 07:01  -  04-03-22 @ 17:55  --------------------------------------------------------  IN: 1005 mL / OUT: 0 mL / NET: 1005 mL    Consultant(s) Notes Reviewed:  [x ] YES      MEDICATIONS  (STANDING):  aspirin enteric coated 81 milliGRAM(s) Oral daily  atorvastatin 80 milliGRAM(s) Oral at bedtime  budesonide 160 MICROgram(s)/formoterol 4.5 MICROgram(s) Inhaler 2 Puff(s) Inhalation two times a day  clopidogrel Tablet 75 milliGRAM(s) Oral daily  enoxaparin Injectable 40 milliGRAM(s) SubCutaneous every 24 hours  fenofibrate Tablet 145 milliGRAM(s) Oral daily  insulin lispro (ADMELOG) corrective regimen sliding scale   SubCutaneous three times a day before meals  magnesium oxide 400 milliGRAM(s) Oral three times a day with meals  metoprolol succinate ER 50 milliGRAM(s) Oral daily  omega-3-Acid Ethyl Esters 2 Gram(s) Oral two times a day  pantoprazole    Tablet 40 milliGRAM(s) Oral before breakfast  QUEtiapine 100 milliGRAM(s) Oral at bedtime  sacubitril 24 mG/valsartan 26 mG 1 Tablet(s) Oral two times a day  spironolactone 25 milliGRAM(s) Oral daily  torsemide 20 milliGRAM(s) Oral two times a day    MEDICATIONS  (PRN):  acetaminophen     Tablet .. 650 milliGRAM(s) Oral every 6 hours PRN Temp greater or equal to 38C (100.4F), Mild Pain (1 - 3)  ALBUTerol    90 MICROgram(s) HFA Inhaler 2 Puff(s) Inhalation every 6 hours PRN Shortness of Breath and/or Wheezing  albuterol/ipratropium for Nebulization 3 milliLiter(s) Nebulizer every 6 hours PRN Shortness of Breath and/or Wheezing  melatonin 3 milliGRAM(s) Oral at bedtime PRN Insomnia    LABS                          14.4   5.06  )-----------( 185      ( 04 Apr 2022 04:30 )             44.8     04-04    139  |  94<L>  |  65<HH>  ----------------------------<  176<H>  4.7   |  27  |  1.5    Ca    10.2<H>      04 Apr 2022 04:30  Mg     2.4     04-04    TPro  7.5  /  Alb  4.2  /  TBili  0.9  /  DBili  x   /  AST  15  /  ALT  8   /  AlkPhos  81  04-04      RADIOLOGY & ADDITIONAL TESTS:    Reviewed     PHYSICAL EXAM:  GENERAL: NAD, well-developed.  HEAD:  Atraumatic, Normocephalic.  EYES: EOMI, PERRLA, conjunctiva and sclera clear.  NECK: Supple, elevated JVP.   CHEST/LUNG: Clear to auscultation bilaterally; No wheeze.  HEART: Regular rate and rhythm; S1 S2.   ABDOMEN: Soft, Nontender, Nondistended; Bowel sounds present.  EXTREMITIES:  2+ Peripheral Pulses, leg edema 1+  PSYCH: AAOx3.  NEUROLOGY: non-focal.  SKIN: No rashes or lesions.    MEDICATIONS  (STANDING):  aspirin enteric coated 81 milliGRAM(s) Oral daily  atorvastatin 80 milliGRAM(s) Oral at bedtime  budesonide 160 MICROgram(s)/formoterol 4.5 MICROgram(s) Inhaler 2 Puff(s) Inhalation two times a day  buMETAnide Infusion 1 mG/Hr (5 mL/Hr) IV Continuous <Continuous>  buMETAnide Injectable 2 milliGRAM(s) IV Push once  clopidogrel Tablet 75 milliGRAM(s) Oral daily  enoxaparin Injectable 40 milliGRAM(s) SubCutaneous every 24 hours  fenofibrate Tablet 145 milliGRAM(s) Oral daily  insulin lispro (ADMELOG) corrective regimen sliding scale   SubCutaneous three times a day before meals  magnesium oxide 400 milliGRAM(s) Oral three times a day with meals  metoprolol succinate ER 50 milliGRAM(s) Oral daily  omega-3-Acid Ethyl Esters 2 Gram(s) Oral two times a day  pantoprazole    Tablet 40 milliGRAM(s) Oral before breakfast  QUEtiapine 100 milliGRAM(s) Oral at bedtime  sacubitril 24 mG/valsartan 26 mG 1 Tablet(s) Oral two times a day  sodium chloride 3%. 150 milliLiter(s) (50 mL/Hr) IV Continuous <Continuous>  spironolactone 25 milliGRAM(s) Oral daily  torsemide 20 milliGRAM(s) Oral two times a day    MEDICATIONS  (PRN):  acetaminophen     Tablet .. 650 milliGRAM(s) Oral every 6 hours PRN Temp greater or equal to 38C (100.4F), Mild Pain (1 - 3)  ALBUTerol    90 MICROgram(s) HFA Inhaler 2 Puff(s) Inhalation every 6 hours PRN Shortness of Breath and/or Wheezing  albuterol/ipratropium for Nebulization 3 milliLiter(s) Nebulizer every 6 hours PRN Shortness of Breath and/or Wheezing  melatonin 3 milliGRAM(s) Oral at bedtime PRN Insomnia

## 2022-04-05 ENCOUNTER — TRANSCRIPTION ENCOUNTER (OUTPATIENT)
Age: 57
End: 2022-04-05

## 2022-04-05 VITALS
TEMPERATURE: 98 F | DIASTOLIC BLOOD PRESSURE: 57 MMHG | SYSTOLIC BLOOD PRESSURE: 105 MMHG | RESPIRATION RATE: 18 BRPM | HEART RATE: 112 BPM

## 2022-04-05 LAB
ANION GAP SERPL CALC-SCNC: 16 MMOL/L — HIGH (ref 7–14)
BUN SERPL-MCNC: 62 MG/DL — CRITICAL HIGH (ref 10–20)
CALCIUM SERPL-MCNC: 9.3 MG/DL — SIGNIFICANT CHANGE UP (ref 8.5–10.1)
CHLORIDE SERPL-SCNC: 97 MMOL/L — LOW (ref 98–110)
CO2 SERPL-SCNC: 26 MMOL/L — SIGNIFICANT CHANGE UP (ref 17–32)
CREAT SERPL-MCNC: 1.4 MG/DL — SIGNIFICANT CHANGE UP (ref 0.7–1.5)
EGFR: 44 ML/MIN/1.73M2 — LOW
GLUCOSE BLDC GLUCOMTR-MCNC: 129 MG/DL — HIGH (ref 70–99)
GLUCOSE BLDC GLUCOMTR-MCNC: 202 MG/DL — HIGH (ref 70–99)
GLUCOSE SERPL-MCNC: 226 MG/DL — HIGH (ref 70–99)
HCT VFR BLD CALC: 38.7 % — SIGNIFICANT CHANGE UP (ref 37–47)
HGB BLD-MCNC: 12.6 G/DL — SIGNIFICANT CHANGE UP (ref 12–16)
MAGNESIUM SERPL-MCNC: 2 MG/DL — SIGNIFICANT CHANGE UP (ref 1.8–2.4)
MCHC RBC-ENTMCNC: 29.6 PG — SIGNIFICANT CHANGE UP (ref 27–31)
MCHC RBC-ENTMCNC: 32.6 G/DL — SIGNIFICANT CHANGE UP (ref 32–37)
MCV RBC AUTO: 91.1 FL — SIGNIFICANT CHANGE UP (ref 81–99)
NRBC # BLD: 0 /100 WBCS — SIGNIFICANT CHANGE UP (ref 0–0)
PHOSPHATE SERPL-MCNC: 4 MG/DL — SIGNIFICANT CHANGE UP (ref 2.1–4.9)
PLATELET # BLD AUTO: 175 K/UL — SIGNIFICANT CHANGE UP (ref 130–400)
POTASSIUM SERPL-MCNC: 3.6 MMOL/L — SIGNIFICANT CHANGE UP (ref 3.5–5)
POTASSIUM SERPL-SCNC: 3.6 MMOL/L — SIGNIFICANT CHANGE UP (ref 3.5–5)
RBC # BLD: 4.25 M/UL — SIGNIFICANT CHANGE UP (ref 4.2–5.4)
RBC # FLD: 15.9 % — HIGH (ref 11.5–14.5)
SODIUM SERPL-SCNC: 139 MMOL/L — SIGNIFICANT CHANGE UP (ref 135–146)
WBC # BLD: 4.71 K/UL — LOW (ref 4.8–10.8)
WBC # FLD AUTO: 4.71 K/UL — LOW (ref 4.8–10.8)

## 2022-04-05 PROCEDURE — 99239 HOSP IP/OBS DSCHRG MGMT >30: CPT

## 2022-04-05 PROCEDURE — 99233 SBSQ HOSP IP/OBS HIGH 50: CPT

## 2022-04-05 RX ADMIN — Medication 81 MILLIGRAM(S): at 11:21

## 2022-04-05 RX ADMIN — Medication 50 MILLIGRAM(S): at 05:09

## 2022-04-05 RX ADMIN — Medication 145 MILLIGRAM(S): at 11:22

## 2022-04-05 RX ADMIN — CLOPIDOGREL BISULFATE 75 MILLIGRAM(S): 75 TABLET, FILM COATED ORAL at 11:22

## 2022-04-05 RX ADMIN — PANTOPRAZOLE SODIUM 40 MILLIGRAM(S): 20 TABLET, DELAYED RELEASE ORAL at 05:08

## 2022-04-05 RX ADMIN — MAGNESIUM OXIDE 400 MG ORAL TABLET 400 MILLIGRAM(S): 241.3 TABLET ORAL at 11:22

## 2022-04-05 RX ADMIN — Medication 2: at 07:43

## 2022-04-05 RX ADMIN — Medication 2 GRAM(S): at 05:11

## 2022-04-05 RX ADMIN — SACUBITRIL AND VALSARTAN 1 TABLET(S): 24; 26 TABLET, FILM COATED ORAL at 05:10

## 2022-04-05 RX ADMIN — BUDESONIDE AND FORMOTEROL FUMARATE DIHYDRATE 2 PUFF(S): 160; 4.5 AEROSOL RESPIRATORY (INHALATION) at 07:42

## 2022-04-05 RX ADMIN — ENOXAPARIN SODIUM 40 MILLIGRAM(S): 100 INJECTION SUBCUTANEOUS at 05:08

## 2022-04-05 RX ADMIN — MAGNESIUM OXIDE 400 MG ORAL TABLET 400 MILLIGRAM(S): 241.3 TABLET ORAL at 07:43

## 2022-04-05 RX ADMIN — SPIRONOLACTONE 25 MILLIGRAM(S): 25 TABLET, FILM COATED ORAL at 05:10

## 2022-04-05 NOTE — DISCHARGE NOTE PROVIDER - NSDCMRMEDTOKEN_GEN_ALL_CORE_FT
Albuterol (Eqv-ProAir HFA) 90 mcg/inh inhalation aerosol: 2 puff(s) inhaled every 6 hours, As Needed  aspirin 81 mg oral delayed release tablet: 1 tab(s) orally once a day   atorvastatin 80 mg oral tablet: 1 tab(s) orally once a day (at bedtime)  budesonide-formoterol 160 mcg-4.5 mcg/inh inhalation aerosol: 2 puff(s) inhaled 2 times a day   dapagliflozin 10 mg oral tablet: 1 tab(s) orally once a day   ergocalciferol 1.25 mg (50,000 intl units) oral capsule: 1 cap(s) orally every 7 days   fenofibrate 145 mg oral tablet: 1 tab(s) orally once a day  Lovaza 1000 mg oral capsule: 2 cap(s) orally 2 times a day  magnesium oxide 400 mg oral tablet: 1 tab(s) orally 3 times a day (with meals)  metFORMIN 1000 mg oral tablet: 1 tab(s) orally 2 times a day Do not take until 6/23  Metoprolol Succinate ER 50 mg oral tablet, extended release: 1 tab(s) orally once a day   pantoprazole 40 mg oral delayed release tablet: 1 tab(s) orally once a day (before a meal)  Plavix 75 mg oral tablet: 1 tab(s) orally once a day  QUEtiapine 25 mg oral tablet: 1 tab(s) orally once a day (at bedtime)  sacubitril-valsartan 97 mg-103 mg oral tablet: 1 tab(s) orally 2 times a day  spironolactone 25 mg oral tablet: 2 tab(s) orally once a day  torsemide 20 mg oral tablet: 1 tab(s) orally 2 times a day   Albuterol (Eqv-ProAir HFA) 90 mcg/inh inhalation aerosol: 2 puff(s) inhaled every 6 hours, As Needed  aspirin 81 mg oral delayed release tablet: 1 tab(s) orally once a day   atorvastatin 80 mg oral tablet: 1 tab(s) orally once a day (at bedtime)  budesonide-formoterol 160 mcg-4.5 mcg/inh inhalation aerosol: 2 puff(s) inhaled 2 times a day   dapagliflozin 10 mg oral tablet: 1 tab(s) orally once a day   ergocalciferol 1.25 mg (50,000 intl units) oral capsule: 1 cap(s) orally every 7 days   fenofibrate 145 mg oral tablet: 1 tab(s) orally once a day  Lovaza 1000 mg oral capsule: 2 cap(s) orally 2 times a day  magnesium oxide 400 mg oral tablet: 1 tab(s) orally 3 times a day (with meals)  metFORMIN 1000 mg oral tablet: 1 tab(s) orally 2 times a day Do not take until 6/23  Metoprolol Succinate ER 50 mg oral tablet, extended release: 1 tab(s) orally once a day   pantoprazole 40 mg oral delayed release tablet: 1 tab(s) orally once a day (before a meal)  Plavix 75 mg oral tablet: 1 tab(s) orally once a day  QUEtiapine 25 mg oral tablet: 1 tab(s) orally once a day (at bedtime)  sacubitril-valsartan 97 mg-103 mg oral tablet: 1 tab(s) orally 2 times a day  spironolactone 25 mg oral tablet: 2 tab(s) orally once a day  torsemide 20 mg oral tablet: 1 tab(s) orally 2 times a day for 5 days  Then once daily

## 2022-04-05 NOTE — DISCHARGE NOTE PROVIDER - HOSPITAL COURSE
57 year old female with PMH of COPD on 3L home O2, CORNELIUS on CPAP, HFrEF (19% on TTE 02/2022, life vest at home), pulmonary HTN, CAD s/p PCI to RCA, HTN, HLD, CVA with residual LLE weakness, DM, recently quit smoking (2 months ago) presented to the ED for worsening bilateral LE swelling. You were treated for the following active issues.     # bilateral LE edema likely secondary to acute on chronic HFrEF exacerbation  # H/O pulmonary HTN   # H/O PAD with bilateral SFA occlusion  - Echo 02/06/2022: 19% EF, small pericardial effusion, moderate p-HTN, mod-severe MR  - Daily weight. Strict I & Os. Keep I < O. Fluid restriction 1L  - pro-BNP on admission 3425, around baseline.   - CXR performed on admission appears to show cardiomegaly with increased vascular congestion, Follow up official read   - Troponin negative on admission, repeat in AM  - No ischemic changes on EKG  - Continue metoprolol 50mg PO daily, spironolactone 50mg PO daily, entresto 97-103mg PO BID   - continue with aspirin 81mg PO daily, plavix 75mg PO daily, and atorvastatin 80mg PO daily.   - Monitor electrolytes, keep K>4 and Mg >2  - patient left life vest at home, will keep on telemetry for now  - Received Lasix 60 IV for one night  - HF evaluated patient, said to give 2mg Bumex push and start on Bumex 1mg/hr drip  - Patient kept on Bumex drip until 3/26  - Patient's edema significantly improved over 24 hours  - Both legs now improved  - EP consulted for AICD, pt says will discuss with family   - AICD on hold until patient euvolemic in any case   - Monitor UOP closely with daily weights   - Given 3 doses of mag on 3/25  - Per HF team bumex was held on 3/26 and patient started on Torsemide 20 BID from 3/27  - Patient became hypotensive on 3/27, diuretics and BP meds held (Only toprol continued)  - Had planned to restart Torsemide 20 BID in the evening of 3/28, only received 3 doses  - Per HF patient still volume overloaded on 3/29, recommended restarting Bumex drip and spironolactone and stopping torsemide  - Bumex drip stopped on 4/1, pt now Euvolemic  - Switched to torsemide 20mg BID starting 4/1 in the evening  - Restarted Entresto on 3/31  - Pt agreeable to AICD, per Dr. Ge pt cleared for AICD  - EP will see pt for AICD   - AICD on Friday after clearance by Joo     # COPD on 3L home O2 - doubt active exacerbation  # CORNELIUS on CPAP   - now on 3L NC, at baseline  - Keep SpO2 88-92%.   - continue with symbicort  - duonebs PRN   - Incentive spirometry  - continue with CPAP at night     # H/O CAD s/p PCI to RCA  # H/O CVA with residual L sided weakness   # HTN/ HLD   - continue with aspirin 81mg PO daily, Plavix 75mg PO daily, Metoprolol succinate 50mg PO daily, and fenofibrate 145mg PO daily.   - Continue to hold entresto 97-103mg PO BID   - Patient had an episode of hypotension on 3/27, held all BP meds  - Will slowly resume BP meds if systolic pressure holds  - Procal obtained per HF to investigate hypotension; 0.11  - Follow up when to resume BP meds    # DM    - monitor FS, if consistently >180, start insulin protocol    # GERD   - continue with protonix

## 2022-04-05 NOTE — PROGRESS NOTE ADULT - NS ATTEND AMEND GEN_ALL_CORE FT
Cardiomyopathy  Recurrent AT    - GDMT as per HF  - CRT-D as OP  - Lifevest- patient has at home  - 2-week OP f-up
CRT-D when ok with HF  Discussed with patient- agreeable  Will discuss with brother

## 2022-04-05 NOTE — PROGRESS NOTE ADULT - REASON FOR ADMISSION
B/L LE Swelling
Acute on Chronic CHF Exacerbation
CHF
HF exac
CHF
sob
Heart Failure

## 2022-04-05 NOTE — DISCHARGE NOTE NURSING/CASE MANAGEMENT/SOCIAL WORK - PATIENT PORTAL LINK FT
You can access the FollowMyHealth Patient Portal offered by [facility] by registering at the following website: [email or web address]. By joining [facility]’s FollowMyHealth portal, you will also be able to view your health information using other applications (apps) compatible with our system.

## 2022-04-05 NOTE — PROGRESS NOTE ADULT - SUBJECTIVE AND OBJECTIVE BOX
Date of Admission: 3/21/22    Interval History:    - Patient assessed resting in bed, on NC in NAD  - No overnight events or complaints at this time- patient anxious to discharge home, will f/u for AICD placement later this week     Chief Complaint: Patient is a 57y old  Female who presents with a chief complaint of B/L edema    HISTORY OF PRESENT ILLNESS: 57 year old female with PMH of COPD on 3L home O2, CORNELIUS on CPAP, HFrEF (19% on TTE 2022, life vest at home), pulmonary HTN, CAD s/p PCI to RCA, HTN, HLD, CVA with residual LLE weakness, DM, recently quit smoking (2 months ago) presented to the ED for worsening bilateral LE swelling. Patient had noted that for the past week prior to admission, her legs began to have progressively worsening swelling and are now tender. The day of admission, patient was in the bath and complained to the HHA who called 911, so she arrived in the ED without her life vest. Patient was discharged from the hospital on  after being diuresed for acute on chronic HFrEF exacerbation. She states that she has been compliant with her diet and medications, denies any NSAID use. She was not able to follow up with any physicians since her last discharge. Otherwise denies any fevers, chills, chest pain, cough, SOB, or urinary symptoms.   In the ED, HR was 104, vitals otherwise stable. Lab work was remarkable for pro-BNP 3452. Given Lasix 40mg IV in the ED and admitted to medicine for further work up and management.  (22 Mar 2022 00:01)      PAST MEDICAL & SURGICAL HISTORY:  Hypertension  Hyperlipidemia  Anxiety and depression  COPD, severe  CHF (congestive heart failure)  Cerebrovascular accident (CVA)Multiple  Type 2 diabetes mellitus  CKD (chronic kidney disease), stage II  No significant past surgical history        FAMILY HISTORY:  No pertinent family history of premature cardiovascular disease in first degree relatives.  Mother:  of cancer at 65  Father:  of an overdose at 50    SOCIAL HISTORY: Former smoker- states she quit since her last admission a month ago. Denies alcohol or drug use.       Allergies  No Known Allergies    Intolerances    PHYSICAL EXAM:  General Appearance: well appearing, normal for age and gender. 	  Neck: unable to assess due to body habitus  Cardiovascular: regular rate and rhythm S1 S2, No murmurs, +1 B/L LE edema  Respiratory: anterior lung fields clear  Psychiatry: Alert and oriented x 3, Mood & affect appropriate  Gastrointestinal:  Soft, Non-tender  Skin/Integumentary : No rashes, No ecchymoses, No cyanosis	  Neurologic: Non-focal  Musculoskeletal/ extremities: Normal range of motion, No clubbing, cyanosis   Vascular: Peripheral pulses palpable 2+ bilaterally    CARDIAC MARKERS:  Serum Pro-Brain Natriuretic Peptide: 4892 pg/mL (22 @ 10:50)      TELEMETRY EVENTS: 	    ECG:  	3/21/22      Ventricular Rate 103 BPM  Atrial Rate 103 BPM  P-R Interval 172 ms  QRS Duration 152 ms  Q-T Interval 390 ms  QTC Calculation(Bazett) 510 ms  P Axis 91 degrees  R Axis 196 degrees  T Axis 33 degrees    Diagnosis Line *** Suspect arm lead reversal, interpretation assumes no reversal  Sinus tachycardia  Possible Left atrial enlargement  Non-specific intra-ventricular conduction block  Lateral infarct , age undetermined  Abnormal ECG    Confirmed by CARLOTA GAUTHIER MD ([id number]) on 3/22/2022 9:01:55 AM          PREVIOUS DIAGNOSTIC TESTING:      TTE 22    Summary:   1. LV Ejection Fraction by Urena's Method with a biplane EF of 19 %.   2. Severely decreased global left ventricular systolic function.   3. Dilated RV with moderately reduced systolic function.   4. Moderately enlarged left atrium.   5. Severely enlarged right atrium.   6. Sclerotic aorti cvalve with normal opening.   7. Mild aortic regurgitation.   8. The mitral valve leaflets are tethered due to reduced systolic   function and elevated LVDP.   9. Moderate-to-severe mitral regurgitation.  10. Severe tricuspid regurgitation.  11. Estimated pulmonary artery systolic pressure is 53.7 mmHg assuming a   right atrial pressure of 15 mmHg, which is consistent with moderate   pulmonary hypertension.  12. Small pericardial effusion adjacent to the right atrium.        TTE 21    Summary:   1. Left ventricular ejection fraction, by visual estimation, is 35 to 40%.   2.Moderately decreased global left ventricular systolic function.   3. Multiple left ventricular regional wall motion abnormalities exist. See wall motion findings.   4. Elevated left atrial and left ventricular end-diastolic pressures.   5. Mild concentric left ventricular hypertrophy.   6. Mildly increased LV wall thickness.   7. Normal left ventricular internal cavity size.   8. Spectral Doppler shows restrictive pattern of left ventricular myocardial filling (Grade III diastolic dysfunction).  9. Moderately reduced RV systolic function.  10. Mildly enlarged left atrium.  11. Moderately enlarged right atrium.  12. Small pericardial effusion.  13. Mild to moderate mitral valve regurgitation.  14. Moderate-severe tricuspid regurgitation.  15.Mild aortic regurgitation.  16. Sclerotic aortic valve with normal opening.  17. Estimated pulmonary artery systolic pressure is 50.0 mmHg assuming a right atrial pressure of 15 mmHg, which is consistent with moderate pulmonary hypertension.  18. Pulmonary hypertension is present.  19. LA volume Index is 36.7 ml/m² ml/m2.    	    Home Medications:  Albuterol (Eqv-ProAir HFA) 90 mcg/inh inhalation aerosol: 2 puff(s) inhaled every 6 hours, As Needed (2021 11:22)  aspirin 81 mg oral tablet: 1 tab(s) orally once a day (2021 11:22)  fenofibrate 145 mg oral tablet: 1 tab(s) orally once a day (2021 11:22)  glipiZIDE 10 mg oral tablet: 1 tab(s) orally 2 times a day (2021 11:22)  Lovaza 1000 mg oral capsule: 2 cap(s) orally 2 times a day (2021 14:52)  metFORMIN 1000 mg oral tablet: 1 tab(s) orally 2 times a day Do not take until  (2021 12:58)  Plavix 75 mg oral tablet: 1 tab(s) orally once a day (2021 11:22)  Vitamin D2 1.25 mg (50,000 intl units) oral capsule: 1 cap(s) orally once a week (2021 14:52)    MEDICATIONS  (STANDING):  aspirin  chewable 81 milliGRAM(s) Oral daily  atorvastatin 80 milliGRAM(s) Oral at bedtime  budesonide 160 MICROgram(s)/formoterol 4.5 MICROgram(s) Inhaler 2 Puff(s) Inhalation two times a day  chlorhexidine 4% Liquid 1 Application(s) Topical <User Schedule>  clopidogrel Tablet 75 milliGRAM(s) Oral daily  enoxaparin Injectable 40 milliGRAM(s) SubCutaneous daily  fenofibrate Tablet 145 milliGRAM(s) Oral daily  furosemide   Injectable 40 milliGRAM(s) IV Push two times a day  magnesium sulfate  IVPB 2 Gram(s) IV Intermittent once  metoprolol succinate ER 50 milliGRAM(s) Oral daily  omega-3-Acid Ethyl Esters 2 Gram(s) Oral two times a day  pantoprazole    Tablet 40 milliGRAM(s) Oral before breakfast  sacubitril 49 mG/valsartan 51 mG 1 Tablet(s) Oral two times a day    MEDICATIONS  (PRN):  ALBUTerol    90 MICROgram(s) HFA Inhaler 2 Puff(s) Inhalation every 6 hours PRN Shortness of Breath and/or Wheezing

## 2022-04-05 NOTE — PROGRESS NOTE ADULT - SUBJECTIVE AND OBJECTIVE BOX
WILLIAN MARY 57y Female  MRN#: [id number]   CODE STATUS: Full     Hospital Day: 15d    Pt is currently admitted with the primary diagnosis of Acute on Chronic CHF exacerbation     SUBJECTIVE  Hospital Course: The patient is clinically and hemodynamically stable. ICD placement likely to be performed on friday pending HF clearance for procedure. Patient may want to be discharged because she does not want to wait for procedure.     Overnight events: No acute overnight events     Subjective complaints: No subjective complaints      Present Today:   - Wilkerson:  No [X], Yes [   ] : Indication:     - Type of IV Access:       .. CVC/Piccline:  No [X], Yes [   ] : Indication:       .. Midline: No [X], Yes [   ] : Indication:                                             ----------------------------------------------------------  OBJECTIVE  PAST MEDICAL & SURGICAL HISTORY  Hypertension    Hyperlipidemia    Anxiety and depression    COPD, severe    CHF (congestive heart failure)    Cerebrovascular accident (CVA)  Multiple    Type 2 diabetes mellitus    CKD (chronic kidney disease), stage II    No significant past surgical history                                              -----------------------------------------------------------  ALLERGIES:  No Known Allergies                                            ------------------------------------------------------------    HOME MEDICATIONS  Home Medications:  atorvastatin 80 mg oral tablet: 1 tab(s) orally once a day (at bedtime) (16 Feb 2022 13:50)  fenofibrate 145 mg oral tablet: 1 tab(s) orally once a day (16 Feb 2022 13:50)  magnesium oxide 400 mg oral tablet: 1 tab(s) orally 3 times a day (with meals) (14 Jan 2022 12:37)  pantoprazole 40 mg oral delayed release tablet: 1 tab(s) orally once a day (before a meal) (16 Nov 2021 12:10)                           MEDICATIONS:  STANDING MEDICATIONS  aspirin enteric coated 81 milliGRAM(s) Oral daily  atorvastatin 80 milliGRAM(s) Oral at bedtime  budesonide 160 MICROgram(s)/formoterol 4.5 MICROgram(s) Inhaler 2 Puff(s) Inhalation two times a day  buMETAnide Infusion 1 mG/Hr IV Continuous <Continuous>  clopidogrel Tablet 75 milliGRAM(s) Oral daily  enoxaparin Injectable 40 milliGRAM(s) SubCutaneous every 24 hours  fenofibrate Tablet 145 milliGRAM(s) Oral daily  insulin lispro (ADMELOG) corrective regimen sliding scale   SubCutaneous three times a day before meals  magnesium oxide 400 milliGRAM(s) Oral three times a day with meals  metoprolol succinate ER 50 milliGRAM(s) Oral daily  omega-3-Acid Ethyl Esters 2 Gram(s) Oral two times a day  pantoprazole    Tablet 40 milliGRAM(s) Oral before breakfast  QUEtiapine 100 milliGRAM(s) Oral at bedtime  sacubitril 24 mG/valsartan 26 mG 1 Tablet(s) Oral two times a day  spironolactone 25 milliGRAM(s) Oral daily    PRN MEDICATIONS  acetaminophen     Tablet .. 650 milliGRAM(s) Oral every 6 hours PRN  ALBUTerol    90 MICROgram(s) HFA Inhaler 2 Puff(s) Inhalation every 6 hours PRN  albuterol/ipratropium for Nebulization 3 milliLiter(s) Nebulizer every 6 hours PRN  melatonin 3 milliGRAM(s) Oral at bedtime PRN                                            ------------------------------------------------------------  VITAL SIGNS: Last 24 Hours  T(C): 35.9 (05 Apr 2022 05:17), Max: 36.8 (04 Apr 2022 20:39)  T(F): 96.7 (05 Apr 2022 05:17), Max: 98.2 (04 Apr 2022 20:39)  HR: 75 (05 Apr 2022 05:17) (75 - 107)  BP: 93/54 (05 Apr 2022 05:17) (93/54 - 112/56)  BP(mean): --  RR: 18 (05 Apr 2022 05:17) (18 - 18)  SpO2: 99% (05 Apr 2022 08:21) (99% - 99%)      04-04-22 @ 07:01  -  04-05-22 @ 07:00  --------------------------------------------------------  IN: 888 mL / OUT: 2777 mL / NET: -1889 mL    04-05-22 @ 07:01  -  04-05-22 @ 10:38  --------------------------------------------------------  IN: 342 mL / OUT: 0 mL / NET: 342 mL                                             --------------------------------------------------------------  LABS:                        12.6   4.71  )-----------( 175      ( 05 Apr 2022 07:16 )             38.7     04-05    139  |  97<L>  |  62<HH>  ----------------------------<  226<H>  3.6   |  26  |  1.4    Ca    9.3      05 Apr 2022 07:16  Phos  4.0     04-05  Mg     2.0     04-05    TPro  7.5  /  Alb  4.2  /  TBili  0.9  /  DBili  x   /  AST  15  /  ALT  8   /  AlkPhos  81  04-04                                            -------------------------------------------------------------  RADIOLOGY:  No new imaging                                           --------------------------------------------------------------    PHYSICAL EXAM:  GENERAL: NAD, well-developed.  HEAD:  Atraumatic, Normocephalic.  EYES: EOMI, PERRLA, conjunctiva and sclera clear.  NECK: Supple, elevated JVP.   CHEST/LUNG: Clear to auscultation bilaterally; No wheeze.  HEART: Regular rate and rhythm; S1 S2.   ABDOMEN: Soft, Nontender, Nondistended; Bowel sounds present.  EXTREMITIES:  2+ Peripheral Pulses, leg edema 1+  PSYCH: AAOx3.  NEUROLOGY: non-focal.  SKIN: No rashes or lesions.                                           --------------------------------------------------------------    ASSESSMENT & PLAN  57 year old female with PMH of COPD on 3L home O2, CORNELIUS on CPAP, HFrEF (19% on TTE 02/2022, life vest at home), pulmonary HTN, CAD s/p PCI to RCA, HTN, HLD, CVA with residual LLE weakness, DM, recently quit smoking (2 months ago) presented to the ED for worsening bilateral LE swelling.     # bilateral LE edema likely secondary to acute on chronic HFrEF exacerbation  # H/O pulmonary HTN   # H/O PAD with bilateral SFA occlusion  - Echo 02/06/2022: 19% EF, small pericardial effusion, moderate p-HTN, mod-severe MR  - Daily weight. Strict I & Os. Keep I < O. Fluid restriction 1L  - pro-BNP on admission 3425, around baseline.   - CXR performed on admission appears to show cardiomegaly with increased vascular congestion, Follow up official read   - Troponin negative on admission, repeat in AM  - No ischemic changes on EKG  - Continue metoprolol 50mg PO daily, spironolactone 50mg PO daily, entresto 97-103mg PO BID   - continue with aspirin 81mg PO daily, plavix 75mg PO daily, and atorvastatin 80mg PO daily.   - Monitor electrolytes, keep K>4 and Mg >2  - patient left life vest at home, will keep on telemetry for now  - Received Lasix 60 IV for one night  - HF evaluated patient, said to give 2mg Bumex push and start on Bumex 1mg/hr drip  - Patient kept on Bumex drip until 3/26  - Patient's edema significantly improved over 24 hours  - Both legs now improved  - EP consulted for AICD, pt says will discuss with family   - AICD on hold until patient euvolemic in any case   - Monitor UOP closely with daily weights   - Given 3 doses of mag on 3/25  - Per HF team bumex was held on 3/26 and patient started on Torsemide 20 BID from 3/27  - Patient became hypotensive on 3/27, diuretics and BP meds held (Only toprol continued)  - Had planned to restart Torsemide 20 BID in the evening of 3/28, only received 3 doses  - Per HF patient still volume overloaded on 3/29, recommended restarting Bumex drip and spironolactone and stopping torsemide  - Bumex drip stopped on 4/1, pt now Euvolemic  - Switched to torsemide 20mg BID starting 4/1 in the evening  - Restarted Entresto on 3/31  - Pt agreeable to AICD, per Dr. Ge pt cleared for AICD  - EP will see pt for AICD   - AICD on Friday after clearance by Joo     # COPD on 3L home O2 - doubt active exacerbation  # CORNELIUS on CPAP   - now on 3L NC, at baseline  - Keep SpO2 88-92%.   - continue with symbicort  - duonebs PRN   - Incentive spirometry  - continue with CPAP at night     # H/O CAD s/p PCI to RCA  # H/O CVA with residual L sided weakness   # HTN/ HLD   - continue with aspirin 81mg PO daily, Plavix 75mg PO daily, Metoprolol succinate 50mg PO daily, and fenofibrate 145mg PO daily.   - Continue to hold entresto 97-103mg PO BID   - Patient had an episode of hypotension on 3/27, held all BP meds  - Will slowly resume BP meds if systolic pressure holds  - Procal obtained per HF to investigate hypotension; 0.11  - Follow up when to resume BP meds    # DM    - monitor FS, if consistently >180, start insulin protocol    # GERD   - continue with protonix     # DVT Prophylaxis: Lovenox  # Diet: DASH/TLC, CC, fluid restriction  # GI Prophylaxis: pantoprazole   # Activity: IAT  # Code Status: Full Code  # Dispo: acute

## 2022-04-05 NOTE — PROGRESS NOTE ADULT - PROVIDER SPECIALTY LIST ADULT
"SCRIBE #1 NOTE: I, Aristeo Sr, am scribing for, and in the presence of, Danielle Gordon DO. I have scribed the entire note.        History      Chief Complaint   Patient presents with    Choking     Mother reports pt choked on gripe water and now has congested sounding cough; pt acting appropriately per mother       Review of patient's allergies indicates:  No Known Allergies     HPI   HPI     2/3/2022, 11:25 PM  History obtained from the parents     History of Present Illness: Miguelito Holder is a 2 m.o. male patient who presents to the Emergency Department for evaluation of chocking on gripe water which occurred at 10 PM. At 10 PM, mother states she was giving the pt 2 mL of gripe water due to having hiccups and an upset stomach; mother states she thinks she gaveit  too fast. After giving the water, the pt experienced vomiting (x1), bubbling at the mouth, cough, and wheezing. Mother states infant slightly  Sleepy after the event.. Parents states the pt didn't have any cyanosis or crying uncontrollably.There was no lack of muscular tone.  Pt had a wet diaper in the ER. parents denies any rhinorrhea, congestion, LOC, cyanosis, fever, and all other sxs at this time. No prior tx included. No further complaints or concerns at this time.   Immunizations UTD.    Length Weight Head Circum Gestation Age D/C Weight APGARs Delivery Method Feeding   20" (50.8 cm) 6 lb 5.2 oz (2.87 kg) 33.0 cm 38 6/7 wks   1min: 7 5min: 8   Vaginal, Unspec. Breast and Bottle Fed   P8B5Iaishg and infant O positive, eddie negGBS positive, treated with AmpicillinProlonged rupture of membranes- 33 hours. Maternal and infant temps to 100 following deliveryNuchal x136h TCB: 8.3Erythromycin, vitamin K and hep B given     Arrival mode: Personal Transport    Pediatrician: No primary care provider on file.    Immunizations: UTD      Past Medical History:  History reviewed. No pertinent medical history.     Past Surgical History:  History reviewed. " No pertinent surgical history.     Family History:  History reviewed. No pertinent family history.     Social History:  Pediatric History   Patient Parents/Guardians    Fannie Bailey (Mother/Guardian)     Other Topics Concern    Unknown   Social History Narrative    Unknown       ROS     Review of Systems   Constitutional: Negative for crying and fever.   HENT: Negative for congestion, rhinorrhea and trouble swallowing.         (+) bubbling at the mouth   Respiratory: Positive for cough and wheezing.    Cardiovascular: Negative for cyanosis.   Gastrointestinal: Positive for vomiting.   Genitourinary: Negative for decreased urine volume.   Musculoskeletal: Negative for extremity weakness.   Skin: Negative for rash.   Neurological: Negative for seizures.        (-) LOC   Hematological: Does not bruise/bleed easily.   All other systems reviewed and are negative.    Physical Exam         Initial Vitals [02/03/22 2259]   BP Pulse Resp Temp SpO2   -- 156 50 97.9 °F (36.6 °C) (!) 100 %      MAP       --         Physical Exam  Vital signs and nursing notes reviewed.  Constitutional: Patient is in no acute distress. Patient is active. Non-toxic. Well-hydrated. Well-appearing. Patient is attentive and interactive. Patient is appropriate for age. No evidence of lethargy or irritability. No lethargy.   Head: Normocephalic and atraumatic.  Ears: Bilateral TMs are unremarkable.  Nose and Throat: Moist mucous membranes. Symmetric palate. Posterior pharynx is clear without exudates. No palatal petechiae. No nasal flaring.   Eyes: PERRL. Conjunctivae are normal. No scleral icterus.  Neck: Supple. No cervical lymphadenopathy. No meningismus.  Cardiovascular: Regular rate and rhythm. No murmurs. Well perfused.  Pulmonary/Chest: No respiratory distress. No retraction, nasal flaring, or grunting. Breath sounds are clear bilaterally. No stridor, wheezing, or rales.  No suprasternal retractions. No intercostal retractions. No tachypnea.    Abdominal: Soft. Non-distended. No crying or grimacing with deep abd palpation. Bowel sounds are normal.  Musculoskeletal: Moves all extremities. Brisk cap refill.  Skin: Warm and dry. No bruising, petechiae, or purpura. No rash. No cyanosis.   Neurological: Alert and interactive. Age appropriate behavior.      ED Course      Procedures  ED Vital Signs:  Vitals:    02/03/22 2259 02/03/22 2324 02/03/22 2359   Pulse: 156  155   Resp: 50  50   Temp: 97.9 °F (36.6 °C) 99.4 °F (37.4 °C)    TempSrc: Axillary Rectal    SpO2: (!) 100%  (!) 100%   Weight: 5.2 kg (11 lb 7.4 oz)       Imaging Results:  Imaging Results          X-Ray Chest 1 View (Final result)  Result time 02/03/22 23:30:11    Final result by Mo Singh MD (02/03/22 23:30:11)                 Impression:      Minimal peribronchial thickening.  No segmental consolidation to suggest pneumonia      Electronically signed by: Francisco Hassan  Date:    02/03/2022  Time:    23:30             Narrative:    EXAMINATION:  XR CHEST 1 VIEW    CLINICAL HISTORY:  Cough, unspecified    TECHNIQUE:  Single frontal view of the chest was performed.    COMPARISON:  None    FINDINGS:  Minimal peribronchial thickeningthe lungs are clear, with normal appearance of pulmonary vasculature and no pleural effusion or pneumothorax.    The cardiac silhouette is normal in size. The hilar and mediastinal contours are unremarkable.    Bones are intact.                                   The Emergency Provider reviewed the vital signs and test results, which are outlined above.    ED Discussion    Medications - No data to display    11:47 PM: Discussed pt's case with Dr. Alvarado (Pediatrician) who recommends  To f/u in clinic at 9 AM.    12:02 AM: Reassessed pt at this time. Discussed with parents all pertinent ED information and results. Discussed pt dx and plan of tx. Gave parents all f/u and return to the ED instructions. All questions and concerns were addressed at this time. Parents  Heart Failure expresses understanding of information and instructions, and is comfortable with plan to discharge. Pt is stable for discharge.    I discussed with family/caretaker that evaluation in the ED does not suggest any emergent or life threatening medical conditions requiring immediate intervention beyond what was provided in the ED, and I believe patient is safe for discharge.  Regardless, an unremarkable evaluation in the ED does not preclude the development or presence of a serious of life threatening condition. As such, parents was instructed to return immediately for any worsening or change in current symptoms.    I have discussed with the family/caretaker that currently the patient is stable with no signs of a serious bacterial infection including meningitis, pneumonia, or pyelonephritis., or other infectious, respiratory, cardiac, toxic, or other EMC.   However, serious infection may be present in a mild, early form, and the patient may develop a worse infection over the next few days. Family/caretaker should bring their child back to ED immediately if there are any mental status changes, persistent vomiting, new rash, difficulty breathing, or any other change in the child's condition that concerns them.     Follow-up Information     Madison Escobar MD.    Specialty: Pediatrics  Why: Follow up in clinic at 9:00 AM.  Return to the ED for:  difficulty breathing, fever, lethargy, fever greater than 100.4, weakness, changes in behavior or other concerns.  Contact information:  [address] [place] [id number] [phone]                       There are no discharge medications for this patient.         Medical Decision Making    MDM  Number of Diagnoses or Management Options     Amount and/or Complexity of Data Reviewed  Tests in the radiology section of CPT®: ordered and reviewed              Scribe Attestation:   Scribe #1: I performed the above scribed service and the documentation accurately  describes the services I performed. I attest to the accuracy of the note.    Attending:   Physician Attestation Statement for Scribe #1: I, Danielle Gordon DO, personally performed the services described in this documentation, as scribed by Aristeo Sr in my presence, and it is both accurate and complete.        Clinical Impression:        ICD-10-CM ICD-9-CM   1. Cough  R05.9 786.2   2. Cough  R05.9 786.2       Disposition:   Disposition: Discharged  Condition: Stable           Danielle Gordon DO  02/04/22 0126

## 2022-04-05 NOTE — DISCHARGE NOTE NURSING/CASE MANAGEMENT/SOCIAL WORK - NSDCPEFALRISK_GEN_ALL_CORE
For information on Fall & Injury Prevention, visit: [email or web address] OR  [email or web address] OR  https://www.cdc.gov/steadi/patient.html

## 2022-04-05 NOTE — PROGRESS NOTE ADULT - ASSESSMENT
Acute on chronic systolic HF / Fluid overload / pulmonary HTN / COPD / DM2 / Anxiety     Patient remains close to euvolemic on exam- POCUS exam: IVC with some collapsibility     Give Torsemide 20mg BID x5 days then daily, take an extra tablet if a weight gain of 2 lbs or more in one day  Continue Spironolactone 25mg daily   Continue Entresto 24/26 mg BID  Continue metoprolol XL 50 mg daily   Get BMP daily  Maintain potassium 4.0-5.0, Mg >2.2  Strict intake and output  Daily weight   Physical therapy f/u  Encourage incentive spirometer use  EP team f/u for AICD   Plan discussed with primary team Acute on chronic systolic HF / Fluid overload / pulmonary HTN / COPD / DM2 / Anxiety     Patient remains close to euvolemic on exam- POCUS exam: IVC with some collapsibility     Give Torsemide 20mg BID x5 days then decrease to daily, take an extra tablet if a weight gain of 2 lbs or more in one day  Continue Spironolactone 25mg daily   Continue Entresto 24/26 mg BID (will titrate higher as tolerated in outpatient office)  Continue metoprolol XL 50 mg daily   Get BMP daily  K 3.6, Mag 2.0- replete   Maintain potassium 4.0-5.0, Mg >2.2  Strict intake and output  Daily weight   Physical therapy  Encourage incentive spirometer use  EP team f/u for AICD   Plan discussed with primary team  Patient can be d/c home from Heart Failure stand point will follow up in outpatient office in 1-2 weeks

## 2022-04-05 NOTE — DISCHARGE NOTE PROVIDER - ATTENDING DISCHARGE PHYSICAL EXAMINATION:
Vital Signs Last 24 Hrs  T(C): 35.9 (05 Apr 2022 05:17), Max: 36.8 (04 Apr 2022 20:39)  T(F): 96.7 (05 Apr 2022 05:17), Max: 98.2 (04 Apr 2022 20:39)  HR: 75 (05 Apr 2022 05:17) (75 - 107)  BP: 93/54 (05 Apr 2022 05:17) (93/54 - 100/68)  RR: 18 (05 Apr 2022 05:17) (18 - 18)  SpO2: 99% (05 Apr 2022 08:21) (99% - 99%)    GEN: NAD  CV: NL S1/2  RESP: CLEAR B/L  GI: +BS, SOFT, NT  Ext: no e/c/c   MS: AOx3                        12.6   4.71  )-----------( 175      ( 05 Apr 2022 07:16 )             38.7   04-05    139  |  97<L>  |  62<HH>  ----------------------------<  226<H>  3.6   |  26  |  1.4    Ca    9.3      05 Apr 2022 07:16  Phos  4.0     04-05  Mg     2.0     04-05    TPro  7.5  /  Alb  4.2  /  TBili  0.9  /  DBili  x   /  AST  15  /  ALT  8   /  AlkPhos  81  04-04    meds: per medrec

## 2022-04-05 NOTE — DISCHARGE NOTE PROVIDER - NSDCCPCAREPLAN_GEN_ALL_CORE_FT
PRINCIPAL DISCHARGE DIAGNOSIS  Diagnosis: CHF (congestive heart failure)  Assessment and Plan of Treatment: You were admitted for CHF exacerbation. You will need an AICD. Follow up with EP as OP.       PRINCIPAL DISCHARGE DIAGNOSIS  Diagnosis: CHF (congestive heart failure)  Assessment and Plan of Treatment: You were admitted for CHF exacerbation. You will need an AICD. Follow up with EP as OP.  HFrEF Acute Exarcerbation  - you were treated by Heart Failure, General Cardiology and Electrophysiology Teams and your care was optimized.   You are now on oral torsemide and entresto and all other meds to assist your heart to improve (GDMT).  - You experienced Acute Kidney Injury and it improved with heart failure therapy.   - You were monitored on Telememetry and observed for any irregular heart beats.   YOU MUST MAINTAIN YOUR LIFE VEST ON ALL THE TIME TO HELP PREVENT SUDDEN DEATH FROM LETHAL ARRYTHMIAS GIVEN SUCH LOW PERFORMING HEART FUNCTION.  HIGHLY RECOMMENDED YOU STAY IN THE HOSPITAL TILL FRIDAY TO GET AICP PLACEMENT BUT YOU WANT TO GO HOME TODAY. THIS WAS ALL EXPLAINED TO YOU AND YOU UNDERSTAND YOU MAY EXPERIENCE SUDDEN DEATH AS LIFE VEST IS % PROTECTIVE - YOU NEED HEART DEFICE IMPLANTATION (AICD). THIS PROCEDURE WAS DELAYED TO GIVE YOUR TIME TO REMOVE ALL EXCESS FLUID FROM YOUR BODY 1ST BEFORE GOING FOR SURGERY.   - YOU UNDERSTAND YOU ARE TAKING A RISK BY GOING HOME TODAY. ALL FOLLOW UP IS URGENT AND LIVE SAVING. GO SEE THE ELECTROPHYSIOLOGIST DR JANIS SEGURA IN THE OFFICE AND THEY WILL SCHEDULE YOU FOR SURGERY FROM CLINIC VISIT.   - PLS TAKE ALL YOUR MEEDICATIONS AS INSTRUCTED  - ALSO FOLLOW WITH HEART FAILURE DR BARRIENTOS

## 2022-04-05 NOTE — PROGRESS NOTE ADULT - SUBJECTIVE AND OBJECTIVE BOX
Interval History: Patient prefers to go home and come back as OP for CRT device. Doesn't want to wait till Friday as scheduled.       Patient is a 57y old  Female who presents with a chief complaint of CHF (22 Mar 2022 11:53)    HPI: Patient is a 57 year old female with PMH of COPD on 3L home O2, CORNELIUS on CPAP, HFrEF (19% on TTE 02/2022, life vest at home), pulmonary HTN, CAD s/p PCI to RCA, HTN, HLD, CVA with residual LLE weakness, DM, recently quit smoking (2 months ago) presented to the ED for worsening bilateral LE swelling. Patient had noted that for the past week prior to admission, her legs began to have progressively worsening swelling and are now tender. The day of admission, patient was in the bath and complained to the HHA who called 911, so she arrived in the ED without her life vest. Patient was discharged from the hospital on 2/16 after being diuresed for acute on chronic HFrEF exacerbation. She states that she has been compliant with her diet and medications, denies any NSAID use. She was not able to follow up with any physicians since her last discharge. Otherwise denies any fevers, chills, chest pain, cough, SOB, or urinary symptoms. Patient admits she stopped wearing her lifevest several weeks ago but reports she has been compliant with all of her medications. Patient was supposed to follow up with Dr Soto to discuss ICD but missed the appointment due to "headache". Denies any dizziness or syncope.    PAST MEDICAL & SURGICAL HISTORY:  Hypertension    Hyperlipidemia    Anxiety and depression    COPD, severe    CHF (congestive heart failure)    Cerebrovascular accident (CVA)  Multiple    Type 2 diabetes mellitus    CKD (chronic kidney disease), stage II    No significant past surgical history      PREVIOUS DIAGNOSTIC TESTING:      ECHO  FINDINGS:  < from: TTE Echo Complete w/o Contrast w/ Doppler (02.06.22 @ 17:21) >  Summary:   1. LV Ejection Fraction by Urena's Method with a biplane EF of 19 %.   2. Severely decreased global left ventricular systolic function.   3. Dilated RV with moderately reduced systolic function.   4. Moderately enlarged left atrium.   5. Severely enlarged right atrium.   6. Sclerotic aorticvalve with normal opening.   7. Mild aortic regurgitation.   8. The mitral valve leaflets are tethered due to reduced systolic   function and elevated LVDP.   9. Moderate-to-severe mitral regurgitation.  10. Severe tricuspid regurgitation.  11. Estimated pulmonary artery systolic pressure is 53.7 mmHg assuming a   right atrial pressure of 15 mmHg, which is consistent with moderate   pulmonary hypertension.  12. Small pericardial effusion adjacent to the right atrium.    < end of copied text >    STRESS  FINDINGS:  < from: NM Nuclear Stress Pharmacologic Multiple (06.16.21 @ 12:58) >  Impression:  1. MODERATE REVERSIBLE DEFECT IN THE SEPTUM, ANTERIOR AND INFERIOR WALL OF THE LEFT VENTRICLE CONSISTENT WITH ISCHEMIA.  2. DILATED LEFT VENTRICLE WITH MARKED GLOBAL HYPOKINESIS. THERE IS INCOMPLETE THICKENING OF THE SEPTUM ANTERIOR AND INFERIOR WALL OF THE LEFT VENTRICLE RAISING QUESTION OF HIBERNATING MYOCARDIUM. THE LATERAL WALL OF THE LEFT VENTRICLE THICKENS NORMALLY.  3. LEFT VENTRICULAR EJECTION FRACTION OF <20 % WHICH IS VERY LOW. ECHOCARDIOGRAPHIC ESTIMATED EJECTION FRACTION 6/14/2021 WAS 35 TO 40%.    < end of copied text >    CATHETERIZATION  FINDINGS:  < from: Cardiac Cath Lab - Adult (06.21.21 @ 11:51) >  CORONARY CIRCULATION: The coronary circulation is right dominant. There was    severe 1-vessel coronary artery disease (RCA). Left main: Normal. LAD:    Angiography showed mild atherosclerosis with no flow limiting lesions. 1st    diagonal: Angiography showed mild atherosclerosis with no flow limiting    lesions. Circumflex: Angiography showed minor luminal irregularities with    no flow limiting lesions. 1st obtuse marginal: Normal. Proximal RCA:    Angiography showed mild atherosclerosisMid RCA: There was a diffuse 90 %    stenosis at a site with no prior intervention. This is a likely culprit    for the patient's clinical presentation. An intervention was performed.    Distal RCA: Angiography showed minor luminal irregularities with no flow    limiting lesions. Right PDA: Normal.         COMPLICATIONS: No complications occurred during the cath lab visit.         IMPRESSIONS: There is significant single vessel coronary artery disease.    < end of copied text >    ELECTROPHYSIOLOGY STUDY  FINDINGS:    CAROTID ULTRASOUND:  FINDINGS    VENOUS DUPLEX SCAN:  FINDINGS:    CHEST CT PULMONARY ANGIO with IV Contrast:  FINDINGS:    MEDICATIONS  (STANDING):  aspirin enteric coated 81 milliGRAM(s) Oral daily  atorvastatin 80 milliGRAM(s) Oral at bedtime  budesonide 160 MICROgram(s)/formoterol 4.5 MICROgram(s) Inhaler 2 Puff(s) Inhalation two times a day  clopidogrel Tablet 75 milliGRAM(s) Oral daily  enoxaparin Injectable 40 milliGRAM(s) SubCutaneous every 24 hours  fenofibrate Tablet 145 milliGRAM(s) Oral daily  furosemide   Injectable 60 milliGRAM(s) IV Push every 12 hours  insulin lispro (ADMELOG) corrective regimen sliding scale   SubCutaneous three times a day before meals  magnesium sulfate  IVPB 2 Gram(s) IV Intermittent every 2 hours  metoprolol succinate ER 50 milliGRAM(s) Oral daily  omega-3-Acid Ethyl Esters 2 Gram(s) Oral two times a day  pantoprazole    Tablet 40 milliGRAM(s) Oral before breakfast  potassium chloride   Powder 40 milliEquivalent(s) Oral every 2 hours  QUEtiapine 25 milliGRAM(s) Oral at bedtime  sacubitril 97 mG/valsartan 103 mG 1 Tablet(s) Oral two times a day  spironolactone 50 milliGRAM(s) Oral daily    MEDICATIONS  (PRN):  acetaminophen     Tablet .. 650 milliGRAM(s) Oral every 6 hours PRN Temp greater or equal to 38C (100.4F), Mild Pain (1 - 3)  ALBUTerol    90 MICROgram(s) HFA Inhaler 2 Puff(s) Inhalation every 6 hours PRN Shortness of Breath and/or Wheezing  albuterol/ipratropium for Nebulization 3 milliLiter(s) Nebulizer every 6 hours PRN Shortness of Breath and/or Wheezing  melatonin 3 milliGRAM(s) Oral at bedtime PRN Insomnia      FAMILY HISTORY:  FH: diabetes mellitus (Father, Mother)    SOCIAL HISTORY: Quit smoking 2 months ago, No ETOH or illicit drug use    Past Surgical History: None    Allergies:  No Known Allergies      REVIEW OF SYSTEMS:  CONSTITUTIONAL: No fever, weight loss, chills, shakes, or fatigue  RESPIRATORY: No cough, wheezing, hemoptysis, or shortness of breath  CARDIOVASCULAR: No chest pain, dyspnea, palpitations, dizziness, syncope, paroxysmal nocturnal dyspnea, orthopnea, or arm or leg swelling  GASTROINTESTINAL: No abdominal  or epigastric pain, nausea, vomiting, hematemesis, diarrhea, constipation, melena or bright red blood.  NEUROLOGICAL: No headaches, memory loss, slurred speech, limb weakness, loss of strength, numbness, or tremors  MUSCULOSKELETAL: +LE swelling      Vital Signs Last 24 Hrs  T(C): 36.9 (22 Mar 2022 08:47), Max: 36.9 (22 Mar 2022 08:47)  T(F): 98.4 (22 Mar 2022 08:47), Max: 98.4 (22 Mar 2022 08:47)  HR: 86 (22 Mar 2022 08:47) (86 - 104)  BP: 106/73 (22 Mar 2022 08:47) (106/73 - 139/74)  BP(mean): 90 (22 Mar 2022 05:53) (90 - 90)  RR: 18 (22 Mar 2022 08:47) (18 - 20)  SpO2: 100% (22 Mar 2022 08:47) (97% - 100%)    PHYSICAL EXAM:  GENERAL: In no apparent distress, well nourished, and hydrated.  EYES: EOMI, PERRLA, conjunctiva and sclera clear  ENMT: No tonsillar erythema, exudates, or enlargements; ist mucous membranes, Good dentition, No lesions  NECK: Supple   HEART: Regular rate and rhythm; No murmurs, rubs, or gallops.  PULMONARY: Clear to auscultation and perfusion.  No rales, wheezing, or rhonchi bilaterally.  ABDOMEN: Soft, Nontender, Nondistended; Bowel sounds present  EXTREMITIES:  2+ Peripheral Pulses, No clubbing, cyanosis, or edema  NEUROLOGICAL: Grossly nonfocal      INTERPRETATION OF TELEMETRY: NSR 78 bpm    ECG:  < from: 12 Lead ECG (03.22.22 @ 10:24) >    Ventricular Rate 78 BPM    Atrial Rate 78 BPM    P-R Interval 192 ms    QRS Duration 148 ms    Q-T Interval 466 ms    QTC Calculation(Bazett) 531 ms    P Axis 60 degrees    R Axis 197 degrees    T Axis 18 degrees    Diagnosis Line Normal sinus rhythm  Possible Left atrial enlargement  Non-specific intra-ventricular conduction block  Lateral infarct , age undetermined  Abnormal ECG    Confirmed by CARLOTA GAUTHIER MD ([id number]) on 3/22/2022 1:16:17 PM    < end of copied text >      I&O's Detail      LABS:                        12.7   4.75  )-----------( 213      ( 22 Mar 2022 06:30 )             41.3     03-22    141  |  103  |  7<L>  ----------------------------<  90  3.2<L>   |  27  |  0.7    Ca    9.0      22 Mar 2022 06:30  Mg     1.5     03-22    TPro  6.6  /  Alb  3.5  /  TBili  1.8<H>  /  DBili  x   /  AST  15  /  ALT  8   /  AlkPhos  122<H>  03-22    CARDIAC MARKERS ( 22 Mar 2022 06:30 )  x     / <0.01 ng/mL / x     / x     / x      CARDIAC MARKERS ( 21 Mar 2022 22:07 )  x     / <0.01 ng/mL / x     / x     / x              BNPSerum Pro-Brain Natriuretic Peptide: 3452 pg/mL (03-21 @ 22:07)    I&O's Detail    Daily Height in cm: 142.24 (21 Mar 2022 16:39)    Daily     RADIOLOGY & ADDITIONAL STUDIES:

## 2022-04-05 NOTE — DISCHARGE NOTE PROVIDER - CARE PROVIDERS DIRECT ADDRESSES
[email or web address]

## 2022-04-05 NOTE — DISCHARGE NOTE PROVIDER - PROVIDER TOKENS
PROVIDER:[TOKEN:[[id number]],FOLLOWUP:[1 week]],PROVIDER:[TOKEN:[[id number]],FOLLOWUP:[1 week]],PROVIDER:[TOKEN:[[id number]],FOLLOWUP:[2 weeks]]

## 2022-04-07 DIAGNOSIS — I25.10 ATHEROSCLEROTIC HEART DISEASE OF NATIVE CORONARY ARTERY WITHOUT ANGINA PECTORIS: ICD-10-CM

## 2022-04-07 DIAGNOSIS — J44.9 CHRONIC OBSTRUCTIVE PULMONARY DISEASE, UNSPECIFIED: ICD-10-CM

## 2022-04-07 DIAGNOSIS — G47.33 OBSTRUCTIVE SLEEP APNEA (ADULT) (PEDIATRIC): ICD-10-CM

## 2022-04-07 DIAGNOSIS — I69.354 HEMIPLEGIA AND HEMIPARESIS FOLLOWING CEREBRAL INFARCTION AFFECTING LEFT NON-DOMINANT SIDE: ICD-10-CM

## 2022-04-07 DIAGNOSIS — K21.9 GASTRO-ESOPHAGEAL REFLUX DISEASE WITHOUT ESOPHAGITIS: ICD-10-CM

## 2022-04-07 DIAGNOSIS — F32.9 MAJOR DEPRESSIVE DISORDER, SINGLE EPISODE, UNSPECIFIED: ICD-10-CM

## 2022-04-07 DIAGNOSIS — I08.1 RHEUMATIC DISORDERS OF BOTH MITRAL AND TRICUSPID VALVES: ICD-10-CM

## 2022-04-07 DIAGNOSIS — I95.9 HYPOTENSION, UNSPECIFIED: ICD-10-CM

## 2022-04-07 DIAGNOSIS — I27.20 PULMONARY HYPERTENSION, UNSPECIFIED: ICD-10-CM

## 2022-04-07 DIAGNOSIS — I13.0 HYPERTENSIVE HEART AND CHRONIC KIDNEY DISEASE WITH HEART FAILURE AND STAGE 1 THROUGH STAGE 4 CHRONIC KIDNEY DISEASE, OR UNSPECIFIED CHRONIC KIDNEY DISEASE: ICD-10-CM

## 2022-04-07 DIAGNOSIS — Z79.82 LONG TERM (CURRENT) USE OF ASPIRIN: ICD-10-CM

## 2022-04-07 DIAGNOSIS — E11.51 TYPE 2 DIABETES MELLITUS WITH DIABETIC PERIPHERAL ANGIOPATHY WITHOUT GANGRENE: ICD-10-CM

## 2022-04-07 DIAGNOSIS — Z95.5 PRESENCE OF CORONARY ANGIOPLASTY IMPLANT AND GRAFT: ICD-10-CM

## 2022-04-07 DIAGNOSIS — Z79.84 LONG TERM (CURRENT) USE OF ORAL HYPOGLYCEMIC DRUGS: ICD-10-CM

## 2022-04-07 DIAGNOSIS — N17.9 ACUTE KIDNEY FAILURE, UNSPECIFIED: ICD-10-CM

## 2022-04-07 DIAGNOSIS — N18.2 CHRONIC KIDNEY DISEASE, STAGE 2 (MILD): ICD-10-CM

## 2022-04-07 DIAGNOSIS — Z99.81 DEPENDENCE ON SUPPLEMENTAL OXYGEN: ICD-10-CM

## 2022-04-07 DIAGNOSIS — I42.9 CARDIOMYOPATHY, UNSPECIFIED: ICD-10-CM

## 2022-04-07 DIAGNOSIS — I47.2 VENTRICULAR TACHYCARDIA: ICD-10-CM

## 2022-04-07 DIAGNOSIS — E11.22 TYPE 2 DIABETES MELLITUS WITH DIABETIC CHRONIC KIDNEY DISEASE: ICD-10-CM

## 2022-04-07 DIAGNOSIS — F41.9 ANXIETY DISORDER, UNSPECIFIED: ICD-10-CM

## 2022-04-07 DIAGNOSIS — Z87.891 PERSONAL HISTORY OF NICOTINE DEPENDENCE: ICD-10-CM

## 2022-04-07 DIAGNOSIS — I50.23 ACUTE ON CHRONIC SYSTOLIC (CONGESTIVE) HEART FAILURE: ICD-10-CM

## 2022-04-07 DIAGNOSIS — I31.3 PERICARDIAL EFFUSION (NONINFLAMMATORY): ICD-10-CM

## 2022-04-07 DIAGNOSIS — E78.5 HYPERLIPIDEMIA, UNSPECIFIED: ICD-10-CM

## 2022-04-07 DIAGNOSIS — Z79.01 LONG TERM (CURRENT) USE OF ANTICOAGULANTS: ICD-10-CM

## 2022-04-07 DIAGNOSIS — Z91.19 PATIENT'S NONCOMPLIANCE WITH OTHER MEDICAL TREATMENT AND REGIMEN: ICD-10-CM

## 2022-04-12 ENCOUNTER — APPOINTMENT (OUTPATIENT)
Dept: CARDIOLOGY | Facility: CLINIC | Age: 57
End: 2022-04-12

## 2022-04-25 NOTE — PROGRESS NOTE ADULT - SUBJECTIVE AND OBJECTIVE BOX
CC: Numbness    HPI:  Fabio Marie Jr. is a  79 y.o.  right-handed white male who was sent for neurological consultation by Dr. Sammy wiggins regarding numbness in the feet.  The patient indicates symptoms just sort of came up out of the blue perhaps 2 years ago.  He says it feels like there is a sock balled up along the distal portion of his foot.  Some tingling may occur and it may occur up into the foot somewhat further but mostly its the distal one third of the foot that is symptomatic.  He says its not really very painful only once in a while is or any pain and it would occur late at night.  He denies numbness in his hands but states that his hands are stiff and especially after playing the tar they get stiff for in the mornings.    He denies a history of diabetes or thyroid disease.  He quit smoking in 1976 and he does not drink any alcoholic beverages.  There is no family history of peripheral neuropathy and no one in the family with stroke, brain tumor, brain hemorrhage or aneurysm of the brain artery.    The patient indicates that he sweats and actually a bit excessively.  He denies dry mouth dry eye symptoms and has no syncope.  Occasionally he gets lightheaded along with some allergy symptoms.  No skin rash.  He denies bowel or bladder dysfunction.  He indicates no specific low back or neck pain.        Past Medical History:   Diagnosis Date   • Arthritis     OSTEO   • BPH (benign prostatic hyperplasia)    • Dermatitis    • Deviated septum 08/08/2013    MILD    • History of kidney stones    • Hyperlipidemia    • Hypertension    • Noise-induced hearing loss of both ears          Past Surgical History:   Procedure Laterality Date   • CATARACT EXTRACTION Bilateral 2020   • CYSTOSCOPY W/ LASER LITHOTRIPSY      SEVERAL TIMES   • EXCISION MASS HEAD/NECK Left 03/31/2017    Procedure: EXCISION OF LEFT TEMPORAL SKIN TUMOR WITH DUAL LAYER CLOSURE AND FROZEN SECTION;  Surgeon: Miki Combs MD;   Location: [facility] OR [facility];  Service:    • PROSTATE SURGERY N/A     BIOPSY-BENIGN   • TONSILLECTOMY             Current Outpatient Medications:   •  ACETAMINOPHEN EXTRA STRENGTH PO, Take 500 mg by mouth as needed., Disp: , Rfl:   •  amLODIPine (NORVASC) 5 MG tablet, Take 1 tablet by mouth Daily., Disp: 90 tablet, Rfl: 3  •  atorvastatin (LIPITOR) 10 MG tablet, Take 1 tablet by mouth Daily., Disp: 90 tablet, Rfl: 3  •  benazepril (LOTENSIN) 20 MG tablet, Take 1 tablet by mouth Daily., Disp: 90 tablet, Rfl: 3  •  Cholecalciferol (Vitamin D3) 250 MCG (76194 UT) tablet, Take  by mouth., Disp: , Rfl:   •  finasteride (PROSCAR) 5 MG tablet, Take 5 mg by mouth Every Evening., Disp: , Rfl:   •  multivitamin with minerals (MULTIVITAMIN ADULTS 50+ PO), Take 1 tablet by mouth Daily., Disp: , Rfl:       Family History   Problem Relation Age of Onset   • Heart disease Mother    • Hypertension Mother    • Aneurysm Father    • Kidney disease Sister          Social History     Socioeconomic History   • Marital status:    Tobacco Use   • Smoking status: Former Smoker     Packs/day: 0.50     Years: 18.00     Pack years: 9.00     Types: Cigarettes     Quit date:      Years since quittin.3   • Smokeless tobacco: Never Used   Substance and Sexual Activity   • Alcohol use: Yes     Comment: 1-2 DRINKS WEEKLY    • Drug use: No   • Sexual activity: Defer         No Known Allergies      Pain Scale: 0/10        ROS:  Review of Systems   Constitutional: Negative for activity change, appetite change and fatigue.   HENT: Positive for sinus pressure and tinnitus. Negative for ear pain, facial swelling and hearing loss.    Eyes: Negative for pain, redness and itching.   Respiratory: Negative for cough, choking and shortness of breath.    Cardiovascular: Negative for chest pain and leg swelling.   Gastrointestinal: Negative for abdominal pain, nausea and vomiting.   Endocrine: Negative for cold intolerance, heat intolerance and  "polydipsia.   Musculoskeletal: Negative for arthralgias, back pain and joint swelling.   Skin: Negative for color change, pallor and rash.   Allergic/Immunologic: Negative for environmental allergies and food allergies.   Neurological: Positive for dizziness and light-headedness. Negative for tremors, seizures, syncope, facial asymmetry, speech difficulty, weakness, numbness and headaches.   Psychiatric/Behavioral: Negative for agitation, behavioral problems, confusion, decreased concentration, dysphoric mood, hallucinations, self-injury, sleep disturbance and suicidal ideas. The patient is not nervous/anxious and is not hyperactive.          I have reviewed and agree with the above ROS completed by the medical assistant.      Physical Exam:  Vitals:    04/25/22 1451   BP: 142/80   Pulse: 71   SpO2: 94%   Weight: 91.2 kg (201 lb)   Height: 176.5 cm (69.49\")     Orthostatic BP: Seated blood pressure 160/90; no change standing.  (No exam bench in this office)    Body mass index is 29.27 kg/m².    Physical Exam  General: Overweight white male no acute distress  HEENT: Normocephalic no evidence of trauma.  Discs are poorly seen due to reflections off of his implants.  Throat negative  Neck: Supple.  No thyromegaly.  No cervical bruits.  No Lhermitte sign.  Heart: Regular rate and rhythm no murmurs.  No pedal edema.  Extremities: Radial pulses were strong and simultaneous      Neurological Exam:   Mental Status: Awake, alert, oriented to person, place and time.  Conversant without evidence of an affective disorder, thought disorder, delusions or hallucinations.  Attention span and concentration are normal.  HCF: No aphasia, apraxia or dysarthria.  Recent and remote memory intact.  Knowledge of recent events intact.  CN: I:   II: Visual fields full without left inattention   III, IV, VI: Eye movements intact without nystagmus or ptosis.  Pupils equal round and reactive to light.   V,VII: Light touch and pinprick intact " all 3 divisions of V.  Facial muscles symmetrical.   VIII: Hearing intact to finger rub   IX,X: Soft palate elevates symmetrically   XI: Sternomastoid and trapezius are strong.   XII: Tongue midline without atrophy or fasciculations  Motor: Normal tone and bulk in the upper and lower extremities   Power testing: Normal power in all muscles tested in the arms and legs  Reflexes: Upper extremities: Diffusely        Lower extremities: Diffusely hypoactive        Toe signs: Downgoing bilaterally  Sensory: Light touch: Normal in the upper extremities and in the lower extremities.        Pinprick: Normal in the upper and lower extremities        Vibration: Intact at the ankle        Position: Intact at the great toes.   Monofilament testing on the sole of foot was about 50% on both sides    Cerebellar: Finger-to-nose: Intact           Rapid movement: Intact           Heel-to-shin: Intact  Gait and Station: Casual walk is mildly broad-based.  Able to toe and heel walk.  Tandem walking is abnormal.  No Romberg no drift    Results:      Lab Results   Component Value Date    GLUCOSE 93 01/25/2022    BUN 22 01/25/2022    CREATININE 1.38 (H) 01/25/2022    EGFRIFNONA 49 (L) 01/25/2022    EGFRIFAFRI 57 (L) 01/25/2022    BCR 16 01/25/2022    CO2 21 01/25/2022    CALCIUM 9.7 01/25/2022    PROTENTOTREF 7.0 01/25/2022    ALBUMIN 4.6 01/25/2022    ALBUMIN 3.8 01/25/2022    LABIL2 1.9 01/25/2022    LABIL2 1.2 01/25/2022    AST 22 01/25/2022    ALT 42 01/25/2022       Lab Results   Component Value Date    WBC 6.7 01/25/2022    HGB 16.5 01/25/2022    HCT 48.9 01/25/2022    MCV 85 01/25/2022     01/25/2022         .No results found for: RPR      Lab Results   Component Value Date    TSH 1.430 01/25/2022         Lab Results   Component Value Date    GTFZMRRW86 528 01/25/2022         Lab Results   Component Value Date    FOLATE >20.00 01/20/2021         Lab Results   Component Value Date    HGBA1C 5.6 01/25/2022         Lab Results    Component Value Date    GLUCOSE 93 01/25/2022    BUN 22 01/25/2022    CREATININE 1.38 (H) 01/25/2022    EGFRIFNONA 49 (L) 01/25/2022    EGFRIFAFRI 57 (L) 01/25/2022    BCR 16 01/25/2022    K 4.6 01/25/2022    CO2 21 01/25/2022    CALCIUM 9.7 01/25/2022    PROTENTOTREF 7.0 01/25/2022    ALBUMIN 4.6 01/25/2022    ALBUMIN 3.8 01/25/2022    LABIL2 1.9 01/25/2022    LABIL2 1.2 01/25/2022    AST 22 01/25/2022    ALT 42 01/25/2022         Lab Results   Component Value Date    WBC 6.7 01/25/2022    HGB 16.5 01/25/2022    HCT 48.9 01/25/2022    MCV 85 01/25/2022     01/25/2022             Assessment:   1.  Peripheral neuropathy-given that he has had this a couple of years without significant progression I suspect this is the neuropathy of the aged patient or other idiopathic form.  A pretty good laboratory investigation has not demonstrated a specific cause.          Plan:  1.  Complete evaluation for neuropathy because that is treatable.  Check immunofixation, cryoglobulins, RPR, heavy metals, copper level.  2.  Since the patient does not have significant pain there is no reason to treat him with gabapentin or an SNRI or neuropathy cream for that matter at the present time.  3.  Follow-up with Marisela or one of the nurse practitioners regarding the labs in about a month.            Time: 45 minutes              Dictated utilizing Dragon dictation.    Interval History:    - Patient resting in bed, remains overloaded.        HISTORY OF PRESENT ILLNESS:     56 year old female with a pmhx of chronic hypoxic respiratory failure (on 4L home O2) secondary to  HFrEF 38% (last exacerbation in ) , HTN, HLD, DM2, active smoker , CVA with residual LE weakness (, wheelchair bound),  COPD , mood /depression,  presents of worsening shortness of breath and increase weight with  B/L LE x 2weeks. Patient mentions that she hasn't been taking Lasix for the past two weeks as she ran out of it. Reports she is non compliant with fluid intake. Denies any chest pain, nausea, vomiting, urinary symptoms, fever, chills. Of note patient received 1 st dose of Moderna two weeks, due for 2nd shot on .    Patient reports worsening SOB for the past few weeks, now feeling better after diuresis. Patient endorses non-compliance with low sodium diet and was not aware she did not receive her Lasix on her medication package from the pharmacy. Patient denies c/p, palpitations, headaches, bleeding, LH, dizziness, orthopnea, PND, LOC, cough, feeling bloated, N/V/D.        PAST MEDICAL & SURGICAL HISTORY:    Hypertension  Hyperlipidemia  Anxiety and depression  COPD, severe  CHF (congestive heart failure)  Cerebrovascular accident (CVA) Multiple  Type 2 diabetes mellitus  CKD (chronic kidney disease), stage II    No significant past surgical history        FAMILY HISTORY:    No pertinent family history of premature cardiovascular disease in first degree relatives.  Mother:  of cancer at 65  Father:  of an overdose at 50    SOCIAL HISTORY:    Smokes at least a cigarette a day, denies alcohol or drug use, lives alone    Allergies    No Known Allergies    Intolerances      PHYSICAL EXAM:    General Appearance: well appearing, normal for age and gender. 	  Neck: normal JVP, no bruit.   Cardiovascular: regular rate and rhythm S1 S2, + JVD, No murmurs, 1+le edema  Respiratory: Lungs clear to auscultation	  Psychiatry: Alert and oriented x 3, Mood & affect appropriate  Gastrointestinal:  Soft, Non-tender  Musculoskeletal/ extremities: Normal range of motion, No clubbing, cyanosis ble edema  Vascular: Peripheral pulses palpable 2+ bilaterally      PREVIOUS DIAGNOSTIC TESTING:      TTE     Summary:   1. Moderately decreased segmental left ventricular systolic function.   2. LV Ejection Fraction by Urena's Method with a biplane EF of 38 %.   3. Spectral Doppler shows pseudonormal pattern of left ventricular myocardial filling (Grade IIdiastolic dysfunction).   4. Mild to moderate mitral valve regurgitation.   5. The mitral valve leaflets are tethered which is due to reduced systolic function and elevated LVDP.   6. Mild-moderate tricuspid regurgitation.   7. Mild aortic regurgitation.   8. Sclerotic aortic valve with normal opening.   9. Estimated pulmonary artery systolic pressure is 59.7 mmHg assuming a right atrial pressure of 15 mmHg, which is consistent with moderate pulmonary hypertension.      Home Medications:  Albuterol (Eqv-ProAir HFA) 90 mcg/inh inhalation aerosol: 2 puff(s) inhaled every 6 hours, As Needed (2021 11:)  aspirin 81 mg oral tablet: 1 tab(s) orally once a day (:)  BuSpar 10 mg oral tablet: 1 tab(s) orally 3 times a day (:)  fenofibrate 145 mg oral tablet: 1 tab(s) orally once a day (:)  glipiZIDE 10 mg oral tablet: 1 tab(s) orally 2 times a day (:22)  Lipitor 40 mg oral tablet: 1 tab(s) orally once a day (:22)  lisinopril 40 mg oral tablet: 1 tab(s) orally once a day (:22)  metFORMIN 1000 mg oral tablet: 1 tab(s) orally 2 times a day (2021 11:22)  Metoprolol Tartrate 50 mg oral tablet: 1 tab(s) orally 2 times a day (:)  Norvasc 10 mg oral tablet: 1 tab(s) orally once a day (:22)  Plavix 75 mg oral tablet: 1 tab(s) orally once a day (:)  SEROquel 100 mg oral tablet: 2 tab(s) orally once a day (at bedtime) (:)  Tresiba 100 units/mL subcutaneous solution: 50 unit(s) subcutaneous once a day (2021 11:)    MEDICATIONS  (STANDING):  aspirin  chewable 81 milliGRAM(s) Oral daily  atorvastatin 80 milliGRAM(s) Oral at bedtime  budesonide 160 MICROgram(s)/formoterol 4.5 MICROgram(s) Inhaler 2 Puff(s) Inhalation two times a day  busPIRone 10 milliGRAM(s) Oral three times a day  chlorhexidine 4% Liquid 1 Application(s) Topical <User Schedule>  clopidogrel Tablet 75 milliGRAM(s) Oral daily  dextrose 40% Gel 15 Gram(s) Oral once  dextrose 5%. 1000 milliLiter(s) (50 mL/Hr) IV Continuous <Continuous>  dextrose 5%. 1000 milliLiter(s) (100 mL/Hr) IV Continuous <Continuous>  dextrose 50% Injectable 25 Gram(s) IV Push once  dextrose 50% Injectable 12.5 Gram(s) IV Push once  dextrose 50% Injectable 25 Gram(s) IV Push once  enoxaparin Injectable 40 milliGRAM(s) SubCutaneous daily  furosemide   Injectable 40 milliGRAM(s) IV Push every 12 hours  glucagon  Injectable 1 milliGRAM(s) IntraMuscular once  insulin glargine Injectable (LANTUS) 12 Unit(s) SubCutaneous at bedtime  insulin lispro (ADMELOG) corrective regimen sliding scale   SubCutaneous three times a day before meals  insulin lispro Injectable (ADMELOG) 4 Unit(s) SubCutaneous three times a day before meals  lisinopril 40 milliGRAM(s) Oral daily  metoprolol succinate  milliGRAM(s) Oral daily  polyethylene glycol 3350 17 Gram(s) Oral two times a day  QUEtiapine 100 milliGRAM(s) Oral at bedtime  risperiDONE   Tablet 1 milliGRAM(s) Oral daily  risperiDONE   Tablet 3 milliGRAM(s) Oral at bedtime  senna 2 Tablet(s) Oral at bedtime    MEDICATIONS  (PRN):  bisacodyl Suppository 10 milliGRAM(s) Rectal daily PRN Constipation

## 2022-07-21 ENCOUNTER — APPOINTMENT (OUTPATIENT)
Dept: CARDIOLOGY | Facility: CLINIC | Age: 57
End: 2022-07-21

## 2022-09-21 ENCOUNTER — APPOINTMENT (OUTPATIENT)
Dept: CARDIOTHORACIC SURGERY | Facility: CLINIC | Age: 57
End: 2022-09-21

## 2022-09-28 ENCOUNTER — APPOINTMENT (OUTPATIENT)
Dept: CARDIOTHORACIC SURGERY | Facility: CLINIC | Age: 57
End: 2022-09-28

## 2022-09-28 VITALS
DIASTOLIC BLOOD PRESSURE: 82 MMHG | OXYGEN SATURATION: 98 % | BODY MASS INDEX: 45.96 KG/M2 | HEART RATE: 101 BPM | SYSTOLIC BLOOD PRESSURE: 103 MMHG | TEMPERATURE: 97.8 F | HEIGHT: 59 IN | RESPIRATION RATE: 13 BRPM | WEIGHT: 228 LBS

## 2022-09-28 PROCEDURE — 99213 OFFICE O/P EST LOW 20 MIN: CPT

## 2022-09-28 NOTE — PHYSICAL EXAM
[General Appearance - Alert] : alert [General Appearance - In No Acute Distress] : in no acute distress [Sclera] : the sclera and conjunctiva were normal [PERRL With Normal Accommodation] : pupils were equal in size, round, and reactive to light [Extraocular Movements] : extraocular movements were intact [Outer Ear] : the ears and nose were normal in appearance [Neck Appearance] : the appearance of the neck was normal [Auscultation Breath Sounds / Voice Sounds] : lungs were clear to auscultation bilaterally [Heart Rate And Rhythm] : heart rate was normal and rhythm regular [Heart Sounds] : normal S1 and S2 [Heart Sounds Gallop] : no gallops [Murmurs] : no murmurs [Heart Sounds Pericardial Friction Rub] : no pericardial rub [Skin Color & Pigmentation] : normal skin color and pigmentation [Skin Turgor] : normal skin turgor [] : no rash [Deep Tendon Reflexes (DTR)] : deep tendon reflexes were 2+ and symmetric [Sensation] : the sensory exam was normal to light touch and pinprick [No Focal Deficits] : no focal deficits [Oriented To Time, Place, And Person] : oriented to person, place, and time [Impaired Insight] : insight and judgment were intact [Affect] : the affect was normal

## 2022-09-28 NOTE — ASSESSMENT
[FreeTextEntry1] : Ms. WILLIAN MARY is a 57 year F, former smoker, that arrives today for a consultation for removal of the Bi-V.  PMH of COPD on 3 L home O2, DM, GERD, CORNELIUS on CPAP, HFrEF (19% on TTE 02/2022, S/P AICD ), pulmonary HTN, CAD s/p PCI to RCA, HTN, HLD, CVA with residual LLE weakness, DM. Patient was seen by their EP for complaints of fever. It was uncovered that the Bi-V device is infected with skin erosion. Here today for surgical discussion.  Here today for surgical discussion.  \par \par Plan:\par Communication between the skin and the device,\par Case discussed with Brother\par Device needs to be removed\par Labs needs to be done\par X-Ray Portable\par H& P prior\par Return to office after testing\par  for evaluation for safe discharge \par DEAN Nava, [facility] am acting as the scribe for Dr. Snider\par \par CTS Attending\par as above\par there is a skin sinus on the incision scar that exposes the device.\par it is impossible to successfully eradicate the infection without removal of the system (leads and generator)\par pt (and brother by telephone) wish to reconsider the option to remove the ICD, and would not agree to proceed with surgery as recommended by Dr. Taylor\par pt will be re-evaluated when they reach a final decision.\par or office is available if and when they agree to proceed\par pt and brother were advised as above.\par \par -FMR

## 2022-09-28 NOTE — HISTORY OF PRESENT ILLNESS
[FreeTextEntry1] : Ms. WILLIAN MARY is a 57 year F, former smoker, that arrives today for a consultation for removal of the Bi-V.  PMH of COPD on 3 L home O2, DM, GERD, CORNELIUS on CPAP, HFrEF (19% on TTE 02/2022, S/P AICD ), pulmonary HTN, CAD s/p PCI to RCA, HTN, HLD, CVA with residual LLE weakness, DM. Patient was seen by their EP for complaints of fever. It was uncovered that the Bi-V device is infected with skin erosion. Here today for surgical discussion.  \par \par ECOG 3,Fully Dependent\par Lives with 24 hour aid, currently in nursing home \par Former smoker- pack years- Quit April 2022\par Denies major psychiatric history\par \par \par

## 2022-11-18 ENCOUNTER — INPATIENT (INPATIENT)
Facility: HOSPITAL | Age: 57
LOS: 17 days | Discharge: HOME HEALTH | End: 2022-12-06
Attending: INTERNAL MEDICINE | Admitting: INTERNAL MEDICINE

## 2022-11-18 VITALS
RESPIRATION RATE: 22 BRPM | TEMPERATURE: 98 F | DIASTOLIC BLOOD PRESSURE: 98 MMHG | SYSTOLIC BLOOD PRESSURE: 152 MMHG | HEART RATE: 98 BPM | WEIGHT: 199.96 LBS | OXYGEN SATURATION: 96 %

## 2022-11-18 PROCEDURE — 99285 EMERGENCY DEPT VISIT HI MDM: CPT | Mod: GC

## 2022-11-18 PROCEDURE — 93010 ELECTROCARDIOGRAM REPORT: CPT

## 2022-11-18 RX ORDER — FUROSEMIDE 40 MG
80 TABLET ORAL ONCE
Refills: 0 | Status: COMPLETED | OUTPATIENT
Start: 2022-11-18 | End: 2022-11-18

## 2022-11-18 NOTE — ED ADULT TRIAGE NOTE - CHIEF COMPLAINT QUOTE
BIBA for bilateral edema to the lower extremities and left sided upper extremity edema x 4days along with SOB .

## 2022-11-19 LAB
ALBUMIN SERPL ELPH-MCNC: 3.9 G/DL — SIGNIFICANT CHANGE UP (ref 3.5–5.2)
ALBUMIN SERPL ELPH-MCNC: 4.2 G/DL — SIGNIFICANT CHANGE UP (ref 3.5–5.2)
ALP SERPL-CCNC: 117 U/L — HIGH (ref 30–115)
ALP SERPL-CCNC: 130 U/L — HIGH (ref 30–115)
ALT FLD-CCNC: 10 U/L — SIGNIFICANT CHANGE UP (ref 0–41)
ALT FLD-CCNC: 11 U/L — SIGNIFICANT CHANGE UP (ref 0–41)
ANION GAP SERPL CALC-SCNC: 11 MMOL/L — SIGNIFICANT CHANGE UP (ref 7–14)
ANION GAP SERPL CALC-SCNC: 12 MMOL/L — SIGNIFICANT CHANGE UP (ref 7–14)
ANION GAP SERPL CALC-SCNC: 12 MMOL/L — SIGNIFICANT CHANGE UP (ref 7–14)
AST SERPL-CCNC: 22 U/L — SIGNIFICANT CHANGE UP (ref 0–41)
AST SERPL-CCNC: 26 U/L — SIGNIFICANT CHANGE UP (ref 0–41)
BASOPHILS # BLD AUTO: 0.07 K/UL — SIGNIFICANT CHANGE UP (ref 0–0.2)
BASOPHILS # BLD AUTO: 0.08 K/UL — SIGNIFICANT CHANGE UP (ref 0–0.2)
BASOPHILS NFR BLD AUTO: 1 % — SIGNIFICANT CHANGE UP (ref 0–1)
BASOPHILS NFR BLD AUTO: 1.5 % — HIGH (ref 0–1)
BILIRUB SERPL-MCNC: 1.5 MG/DL — HIGH (ref 0.2–1.2)
BILIRUB SERPL-MCNC: 1.6 MG/DL — HIGH (ref 0.2–1.2)
BUN SERPL-MCNC: 15 MG/DL — SIGNIFICANT CHANGE UP (ref 10–20)
BUN SERPL-MCNC: 15 MG/DL — SIGNIFICANT CHANGE UP (ref 10–20)
BUN SERPL-MCNC: 16 MG/DL — SIGNIFICANT CHANGE UP (ref 10–20)
CALCIUM SERPL-MCNC: 8.9 MG/DL — SIGNIFICANT CHANGE UP (ref 8.4–10.5)
CALCIUM SERPL-MCNC: 9.3 MG/DL — SIGNIFICANT CHANGE UP (ref 8.4–10.5)
CALCIUM SERPL-MCNC: 9.3 MG/DL — SIGNIFICANT CHANGE UP (ref 8.4–10.5)
CHLORIDE SERPL-SCNC: 100 MMOL/L — SIGNIFICANT CHANGE UP (ref 98–110)
CHLORIDE SERPL-SCNC: 100 MMOL/L — SIGNIFICANT CHANGE UP (ref 98–110)
CHLORIDE SERPL-SCNC: 99 MMOL/L — SIGNIFICANT CHANGE UP (ref 98–110)
CO2 SERPL-SCNC: 24 MMOL/L — SIGNIFICANT CHANGE UP (ref 17–32)
CO2 SERPL-SCNC: 24 MMOL/L — SIGNIFICANT CHANGE UP (ref 17–32)
CO2 SERPL-SCNC: 25 MMOL/L — SIGNIFICANT CHANGE UP (ref 17–32)
CREAT SERPL-MCNC: 1 MG/DL — SIGNIFICANT CHANGE UP (ref 0.7–1.5)
CREAT SERPL-MCNC: 1 MG/DL — SIGNIFICANT CHANGE UP (ref 0.7–1.5)
CREAT SERPL-MCNC: 1.2 MG/DL — SIGNIFICANT CHANGE UP (ref 0.7–1.5)
EGFR: 53 ML/MIN/1.73M2 — LOW
EGFR: 67 ML/MIN/1.73M2 — SIGNIFICANT CHANGE UP
EGFR: 67 ML/MIN/1.73M2 — SIGNIFICANT CHANGE UP
EOSINOPHIL # BLD AUTO: 0.12 K/UL — SIGNIFICANT CHANGE UP (ref 0–0.7)
EOSINOPHIL # BLD AUTO: 0.12 K/UL — SIGNIFICANT CHANGE UP (ref 0–0.7)
EOSINOPHIL NFR BLD AUTO: 1.7 % — SIGNIFICANT CHANGE UP (ref 0–8)
EOSINOPHIL NFR BLD AUTO: 2.2 % — SIGNIFICANT CHANGE UP (ref 0–8)
GLUCOSE BLDC GLUCOMTR-MCNC: 128 MG/DL — HIGH (ref 70–99)
GLUCOSE BLDC GLUCOMTR-MCNC: 129 MG/DL — HIGH (ref 70–99)
GLUCOSE BLDC GLUCOMTR-MCNC: 137 MG/DL — HIGH (ref 70–99)
GLUCOSE BLDC GLUCOMTR-MCNC: 146 MG/DL — HIGH (ref 70–99)
GLUCOSE SERPL-MCNC: 150 MG/DL — HIGH (ref 70–99)
GLUCOSE SERPL-MCNC: 152 MG/DL — HIGH (ref 70–99)
GLUCOSE SERPL-MCNC: 160 MG/DL — HIGH (ref 70–99)
HCT VFR BLD CALC: 39.5 % — SIGNIFICANT CHANGE UP (ref 37–47)
HCT VFR BLD CALC: 40.1 % — SIGNIFICANT CHANGE UP (ref 37–47)
HGB BLD-MCNC: 12.5 G/DL — SIGNIFICANT CHANGE UP (ref 12–16)
HGB BLD-MCNC: 12.9 G/DL — SIGNIFICANT CHANGE UP (ref 12–16)
IMM GRANULOCYTES NFR BLD AUTO: 0.4 % — HIGH (ref 0.1–0.3)
IMM GRANULOCYTES NFR BLD AUTO: 0.4 % — HIGH (ref 0.1–0.3)
LYMPHOCYTES # BLD AUTO: 0.77 K/UL — LOW (ref 1.2–3.4)
LYMPHOCYTES # BLD AUTO: 0.86 K/UL — LOW (ref 1.2–3.4)
LYMPHOCYTES # BLD AUTO: 12.1 % — LOW (ref 20.5–51.1)
LYMPHOCYTES # BLD AUTO: 14 % — LOW (ref 20.5–51.1)
MAGNESIUM SERPL-MCNC: 1.7 MG/DL — LOW (ref 1.8–2.4)
MCHC RBC-ENTMCNC: 29.3 PG — SIGNIFICANT CHANGE UP (ref 27–31)
MCHC RBC-ENTMCNC: 29.7 PG — SIGNIFICANT CHANGE UP (ref 27–31)
MCHC RBC-ENTMCNC: 31.6 G/DL — LOW (ref 32–37)
MCHC RBC-ENTMCNC: 32.2 G/DL — SIGNIFICANT CHANGE UP (ref 32–37)
MCV RBC AUTO: 92.2 FL — SIGNIFICANT CHANGE UP (ref 81–99)
MCV RBC AUTO: 92.7 FL — SIGNIFICANT CHANGE UP (ref 81–99)
MONOCYTES # BLD AUTO: 0.57 K/UL — SIGNIFICANT CHANGE UP (ref 0.1–0.6)
MONOCYTES # BLD AUTO: 0.69 K/UL — HIGH (ref 0.1–0.6)
MONOCYTES NFR BLD AUTO: 10.3 % — HIGH (ref 1.7–9.3)
MONOCYTES NFR BLD AUTO: 9.7 % — HIGH (ref 1.7–9.3)
NEUTROPHILS # BLD AUTO: 3.95 K/UL — SIGNIFICANT CHANGE UP (ref 1.4–6.5)
NEUTROPHILS # BLD AUTO: 5.34 K/UL — SIGNIFICANT CHANGE UP (ref 1.4–6.5)
NEUTROPHILS NFR BLD AUTO: 71.6 % — SIGNIFICANT CHANGE UP (ref 42.2–75.2)
NEUTROPHILS NFR BLD AUTO: 75.1 % — SIGNIFICANT CHANGE UP (ref 42.2–75.2)
NRBC # BLD: 0 /100 WBCS — SIGNIFICANT CHANGE UP (ref 0–0)
NRBC # BLD: 0 /100 WBCS — SIGNIFICANT CHANGE UP (ref 0–0)
NT-PROBNP SERPL-SCNC: HIGH PG/ML (ref 0–300)
PHOSPHATE SERPL-MCNC: 3.5 MG/DL — SIGNIFICANT CHANGE UP (ref 2.1–4.9)
PLATELET # BLD AUTO: 177 K/UL — SIGNIFICANT CHANGE UP (ref 130–400)
PLATELET # BLD AUTO: 199 K/UL — SIGNIFICANT CHANGE UP (ref 130–400)
POTASSIUM SERPL-MCNC: 4.2 MMOL/L — SIGNIFICANT CHANGE UP (ref 3.5–5)
POTASSIUM SERPL-MCNC: 4.3 MMOL/L — SIGNIFICANT CHANGE UP (ref 3.5–5)
POTASSIUM SERPL-MCNC: 4.9 MMOL/L — SIGNIFICANT CHANGE UP (ref 3.5–5)
POTASSIUM SERPL-SCNC: 4.2 MMOL/L — SIGNIFICANT CHANGE UP (ref 3.5–5)
POTASSIUM SERPL-SCNC: 4.3 MMOL/L — SIGNIFICANT CHANGE UP (ref 3.5–5)
POTASSIUM SERPL-SCNC: 4.9 MMOL/L — SIGNIFICANT CHANGE UP (ref 3.5–5)
PROT SERPL-MCNC: 7.3 G/DL — SIGNIFICANT CHANGE UP (ref 6–8)
PROT SERPL-MCNC: 7.4 G/DL — SIGNIFICANT CHANGE UP (ref 6–8)
RBC # BLD: 4.26 M/UL — SIGNIFICANT CHANGE UP (ref 4.2–5.4)
RBC # BLD: 4.35 M/UL — SIGNIFICANT CHANGE UP (ref 4.2–5.4)
RBC # FLD: 19.6 % — HIGH (ref 11.5–14.5)
RBC # FLD: 19.9 % — HIGH (ref 11.5–14.5)
SARS-COV-2 RNA SPEC QL NAA+PROBE: SIGNIFICANT CHANGE UP
SODIUM SERPL-SCNC: 135 MMOL/L — SIGNIFICANT CHANGE UP (ref 135–146)
SODIUM SERPL-SCNC: 136 MMOL/L — SIGNIFICANT CHANGE UP (ref 135–146)
SODIUM SERPL-SCNC: 136 MMOL/L — SIGNIFICANT CHANGE UP (ref 135–146)
TROPONIN T SERPL-MCNC: 0.02 NG/ML — HIGH
TROPONIN T SERPL-MCNC: 0.03 NG/ML — CRITICAL HIGH
TROPONIN T SERPL-MCNC: 0.03 NG/ML — CRITICAL HIGH
WBC # BLD: 5.51 K/UL — SIGNIFICANT CHANGE UP (ref 4.8–10.8)
WBC # BLD: 7.11 K/UL — SIGNIFICANT CHANGE UP (ref 4.8–10.8)
WBC # FLD AUTO: 5.51 K/UL — SIGNIFICANT CHANGE UP (ref 4.8–10.8)
WBC # FLD AUTO: 7.11 K/UL — SIGNIFICANT CHANGE UP (ref 4.8–10.8)

## 2022-11-19 PROCEDURE — 93010 ELECTROCARDIOGRAM REPORT: CPT

## 2022-11-19 PROCEDURE — 93971 EXTREMITY STUDY: CPT | Mod: 26,LT

## 2022-11-19 PROCEDURE — 71045 X-RAY EXAM CHEST 1 VIEW: CPT | Mod: 26

## 2022-11-19 PROCEDURE — 99223 1ST HOSP IP/OBS HIGH 75: CPT

## 2022-11-19 RX ORDER — DIVALPROEX SODIUM 500 MG/1
250 TABLET, DELAYED RELEASE ORAL
Refills: 0 | Status: DISCONTINUED | OUTPATIENT
Start: 2022-11-19 | End: 2022-11-23

## 2022-11-19 RX ORDER — METOPROLOL TARTRATE 50 MG
100 TABLET ORAL DAILY
Refills: 0 | Status: DISCONTINUED | OUTPATIENT
Start: 2022-11-20 | End: 2022-11-23

## 2022-11-19 RX ORDER — POLYETHYLENE GLYCOL 3350 17 G/17G
17 POWDER, FOR SOLUTION ORAL DAILY
Refills: 0 | Status: DISCONTINUED | OUTPATIENT
Start: 2022-11-19 | End: 2022-11-23

## 2022-11-19 RX ORDER — ZOLPIDEM TARTRATE 10 MG/1
5 TABLET ORAL AT BEDTIME
Refills: 0 | Status: DISCONTINUED | OUTPATIENT
Start: 2022-11-19 | End: 2022-11-23

## 2022-11-19 RX ORDER — FUROSEMIDE 40 MG
40 TABLET ORAL
Refills: 0 | Status: DISCONTINUED | OUTPATIENT
Start: 2022-11-19 | End: 2022-11-23

## 2022-11-19 RX ORDER — INSULIN LISPRO 100/ML
VIAL (ML) SUBCUTANEOUS
Refills: 0 | Status: DISCONTINUED | OUTPATIENT
Start: 2022-11-19 | End: 2022-11-23

## 2022-11-19 RX ORDER — SPIRONOLACTONE 25 MG/1
50 TABLET, FILM COATED ORAL DAILY
Refills: 0 | Status: DISCONTINUED | OUTPATIENT
Start: 2022-11-19 | End: 2022-11-23

## 2022-11-19 RX ORDER — ENOXAPARIN SODIUM 100 MG/ML
40 INJECTION SUBCUTANEOUS EVERY 24 HOURS
Refills: 0 | Status: DISCONTINUED | OUTPATIENT
Start: 2022-11-19 | End: 2022-11-20

## 2022-11-19 RX ORDER — INSULIN GLARGINE 100 [IU]/ML
5 INJECTION, SOLUTION SUBCUTANEOUS AT BEDTIME
Refills: 0 | Status: DISCONTINUED | OUTPATIENT
Start: 2022-11-19 | End: 2022-11-20

## 2022-11-19 RX ORDER — ALBUTEROL 90 UG/1
2 AEROSOL, METERED ORAL EVERY 6 HOURS
Refills: 0 | Status: DISCONTINUED | OUTPATIENT
Start: 2022-11-19 | End: 2022-11-23

## 2022-11-19 RX ORDER — PANTOPRAZOLE SODIUM 20 MG/1
40 TABLET, DELAYED RELEASE ORAL
Refills: 0 | Status: DISCONTINUED | OUTPATIENT
Start: 2022-11-19 | End: 2022-11-23

## 2022-11-19 RX ORDER — CEFEPIME 1 G/1
2000 INJECTION, POWDER, FOR SOLUTION INTRAMUSCULAR; INTRAVENOUS EVERY 8 HOURS
Refills: 0 | Status: DISCONTINUED | OUTPATIENT
Start: 2022-11-19 | End: 2022-11-22

## 2022-11-19 RX ORDER — CEFEPIME 1 G/1
2000 INJECTION, POWDER, FOR SOLUTION INTRAMUSCULAR; INTRAVENOUS ONCE
Refills: 0 | Status: COMPLETED | OUTPATIENT
Start: 2022-11-19 | End: 2022-11-19

## 2022-11-19 RX ORDER — INSULIN LISPRO 100/ML
2 VIAL (ML) SUBCUTANEOUS
Refills: 0 | Status: DISCONTINUED | OUTPATIENT
Start: 2022-11-19 | End: 2022-11-23

## 2022-11-19 RX ORDER — INSULIN GLARGINE 100 [IU]/ML
5 INJECTION, SOLUTION SUBCUTANEOUS EVERY MORNING
Refills: 0 | Status: DISCONTINUED | OUTPATIENT
Start: 2022-11-19 | End: 2022-11-19

## 2022-11-19 RX ORDER — BUDESONIDE AND FORMOTEROL FUMARATE DIHYDRATE 160; 4.5 UG/1; UG/1
2 AEROSOL RESPIRATORY (INHALATION)
Refills: 0 | Status: DISCONTINUED | OUTPATIENT
Start: 2022-11-19 | End: 2022-11-23

## 2022-11-19 RX ORDER — ASPIRIN/CALCIUM CARB/MAGNESIUM 324 MG
81 TABLET ORAL DAILY
Refills: 0 | Status: DISCONTINUED | OUTPATIENT
Start: 2022-11-19 | End: 2022-11-23

## 2022-11-19 RX ORDER — SACUBITRIL AND VALSARTAN 24; 26 MG/1; MG/1
1 TABLET, FILM COATED ORAL
Refills: 0 | Status: DISCONTINUED | OUTPATIENT
Start: 2022-11-19 | End: 2022-11-19

## 2022-11-19 RX ORDER — VANCOMYCIN HCL 1 G
1000 VIAL (EA) INTRAVENOUS EVERY 12 HOURS
Refills: 0 | Status: DISCONTINUED | OUTPATIENT
Start: 2022-11-19 | End: 2022-11-22

## 2022-11-19 RX ORDER — ATORVASTATIN CALCIUM 80 MG/1
80 TABLET, FILM COATED ORAL AT BEDTIME
Refills: 0 | Status: DISCONTINUED | OUTPATIENT
Start: 2022-11-19 | End: 2022-11-23

## 2022-11-19 RX ORDER — VANCOMYCIN HCL 1 G
1500 VIAL (EA) INTRAVENOUS ONCE
Refills: 0 | Status: COMPLETED | OUTPATIENT
Start: 2022-11-19 | End: 2022-11-19

## 2022-11-19 RX ORDER — SACUBITRIL AND VALSARTAN 24; 26 MG/1; MG/1
1 TABLET, FILM COATED ORAL
Refills: 0 | Status: DISCONTINUED | OUTPATIENT
Start: 2022-11-20 | End: 2022-11-23

## 2022-11-19 RX ORDER — CLOPIDOGREL BISULFATE 75 MG/1
75 TABLET, FILM COATED ORAL DAILY
Refills: 0 | Status: DISCONTINUED | OUTPATIENT
Start: 2022-11-19 | End: 2022-11-19

## 2022-11-19 RX ADMIN — CEFEPIME 100 MILLIGRAM(S): 1 INJECTION, POWDER, FOR SOLUTION INTRAMUSCULAR; INTRAVENOUS at 04:37

## 2022-11-19 RX ADMIN — Medication 40 MILLIGRAM(S): at 11:11

## 2022-11-19 RX ADMIN — Medication 250 MILLIGRAM(S): at 17:05

## 2022-11-19 RX ADMIN — Medication 80 MILLIGRAM(S): at 00:45

## 2022-11-19 RX ADMIN — INSULIN GLARGINE 5 UNIT(S): 100 INJECTION, SOLUTION SUBCUTANEOUS at 22:30

## 2022-11-19 RX ADMIN — ATORVASTATIN CALCIUM 80 MILLIGRAM(S): 80 TABLET, FILM COATED ORAL at 22:29

## 2022-11-19 RX ADMIN — ZOLPIDEM TARTRATE 5 MILLIGRAM(S): 10 TABLET ORAL at 23:27

## 2022-11-19 RX ADMIN — ENOXAPARIN SODIUM 40 MILLIGRAM(S): 100 INJECTION SUBCUTANEOUS at 17:05

## 2022-11-19 RX ADMIN — Medication 81 MILLIGRAM(S): at 11:51

## 2022-11-19 RX ADMIN — CEFEPIME 100 MILLIGRAM(S): 1 INJECTION, POWDER, FOR SOLUTION INTRAMUSCULAR; INTRAVENOUS at 22:30

## 2022-11-19 RX ADMIN — DIVALPROEX SODIUM 250 MILLIGRAM(S): 500 TABLET, DELAYED RELEASE ORAL at 17:05

## 2022-11-19 RX ADMIN — SACUBITRIL AND VALSARTAN 1 TABLET(S): 24; 26 TABLET, FILM COATED ORAL at 17:05

## 2022-11-19 RX ADMIN — Medication 250 MILLIGRAM(S): at 07:30

## 2022-11-19 RX ADMIN — BUDESONIDE AND FORMOTEROL FUMARATE DIHYDRATE 2 PUFF(S): 160; 4.5 AEROSOL RESPIRATORY (INHALATION) at 22:30

## 2022-11-19 RX ADMIN — CEFEPIME 100 MILLIGRAM(S): 1 INJECTION, POWDER, FOR SOLUTION INTRAMUSCULAR; INTRAVENOUS at 14:11

## 2022-11-19 RX ADMIN — ZOLPIDEM TARTRATE 5 MILLIGRAM(S): 10 TABLET ORAL at 22:29

## 2022-11-19 NOTE — CONSULT NOTE ADULT - NS ATTEND AMEND GEN_ALL_CORE FT
complex patient  ICD pocket infection with redness and purulent discharge  recommend Blood Cx  YOKASTA  ID consult  CT surgery for ICD extraction

## 2022-11-19 NOTE — ED PROVIDER NOTE - PHYSICAL EXAMINATION
GENERAL: Chronically ill appearing   SKIN: warm, dry.  Small amount of purulent discharge noted from L chest AICD   HEAD: Normocephalic; atraumatic. Facial edema noted   EYES: PERRLA, EOMI, no conjunctival erythema  CARD: S1, S2 normal; no murmurs, gallops, or rubs. Regular rate and rhythm.   RESP: Crackles with faint wheezing noted.   ABD: soft, nontender, and nondistended  EXT: Bilateral LE pitting edema. No TTP.  LUE pitting edema also noted.   NEURO: Alert, oriented, grossly unremarkable  PSYCH: Cooperative, appropriate.

## 2022-11-19 NOTE — H&P ADULT - NSHPPHYSICALEXAM_GEN_ALL_CORE
PHYSICAL EXAM:  Constitutional: pt is comfortable in bed; no acute distress; well-groomed  HEENT: atraumatic, normocephalic   Respiratory: (+) sounds but diminished in all lung fields; some crackling, and secretions appreciated  Cardiovascular: S1 S2 regular rate and rhythm; systolic murmur over sternal border appreciated   Gastrointestinal:  abdomen is non-distended, non-tender to superficial and deep palpation  Extremities: bilateral LE have 1+ pitting edema; and bilateral UE are edematous, R>L   Vascular: Warm and Well perfused UE and LE; no mottling or dusky skin   Neurological: AxO x3 to self, place and year  Skin: AICD pocket is erythematous, with small opening to cutaneous tissue; minimal purulence

## 2022-11-19 NOTE — H&P ADULT - ATTENDING COMMENTS
EKG reviewed normal sinus rhythm, IVCD  CXR reviewed bilateral infiltrates and effusion, ICD in place    PHYSICAL EXAM:  General Appearance: NAD, obese, on nasal cannula, normal for age and gender. 	  Neck: Normal JVP, no bruit.   Eyes: Conjunctiva clear, Extra Ocular muscles intact. No scleral icterus.  ENMT: Moist oral mucosa. No oral lesion.  Cardiovascular: Regular rate and rhythm S1 S2, No JVD, systolic murmur.  Respiratory: Decreased breath sounds at bases. No wheezes, rales or rhonchi.  Psychiatry: Alert and oriented x 3, Mood & affect appropriate.  Gastrointestinal:  Soft, Non-tender, Non-distended. Abdominal and flank wall swelling bilaterally.  Neurologic: Bilateral LE weakness. Left arm weakness.  Musculoskeletal/ extremities: No clubbing, cyanosis. Bilateral LE swelling upto thigh and erythema in lower limb but no warmth. Left UE swelling.  Vascular: Peripheral pulses palpable bilaterally.  Skin/Integumen: No rashes, No ecchymoses, No cyanosis. ICD pocket noted with swelling. Surgical line with opening, erythema and drainage.     # Acute HFrEF exacerbation   - previous TTE 3/2022: 30-35% EF, moderate MR and TR  - start IV lasix 40 mg BID, strict I+Os  - c/w entresto and spironolactone  - on toprol 100 mg daily  - obtain 2D Echo    # Cellulitis, infection of left sided AICD pocket site  - noted pt was given fluconazole and rifampin as outpatient from Oct 19, 2022  - c/w IV cefepime and vancomycin  - follow up blood culture, may need YOKASTA for further evaluation, likely need explant of ICD    # Bilateral LE swelling likely 2/2 CHF  - rule out DVT, order for LE venous duplex    # Chronic hypoxemic respiratory failure 2/2 COPD on 4 L at baseline   # CORNELIUS on CPAP   - CPAP ordered based on prior setting, PEEP 8 and FiO2 40%  - c/w symbicort and albuterol prn  - c/w oxygen supplementation    # CAD s/p PCI+stent to RCA last cath in hospital 6/21/2021  - c/w aspirin and statin    # Elevated troponin likely from demand of CHF exacerbation   - trend cardiac enzymes    # Hx of CVA with residual LE weakness, currently wheelchair bound - c/w aspirin, statin     # DM II - hold home meds, start insulin regimen, titrate as needed    # Hypothyroidism - c/w levothyroxine, check TSH    # CKD stage 2, stable -  monitor BMP    # Suspected seizure disorder vs. mood disorder  - divalproex picked up on sure scripts   - will start 250 mg BID for now, check valproic acid level and med rec     # DVT prophylaxis - on lovenox subcut  # GI prophylaxis - on protonix  # Code status - Full Code

## 2022-11-19 NOTE — CHART NOTE - NSCHARTNOTEFT_GEN_A_CORE
called [phone] for CT surgery to inform them of consult - was informed have to call [phone] for consults  called [phone] for consult - was informed they only cover thoracic and I need to call [phone] for cardiac surgery   called [phone] w continuous ringing, unable to reach provider

## 2022-11-19 NOTE — ED PROVIDER NOTE - OBJECTIVE STATEMENT
56 yo female w/ PMH of COPD, CORNELIUS, CHF, pulmonary HTN, CAD s/p PCI to RCA, HTN, HLD, CVA, DM presents for SOB and edema x 2 days.  Stopped her diuretic recently, noticed more swelling in lower extremities.  Also has been having LUE swelling and cough.  No fevers, chest pain, hx of blood clots, leg pain, hemoptysis.

## 2022-11-19 NOTE — H&P ADULT - HISTORY OF PRESENT ILLNESS
57 year old female with PMH of COPD on 3L home O2, CORNELIUS on CPAP, HFrEF (19% on TTE 02/2022, life vest at home), pulmonary HTN, CAD s/p PCI to RCA, HTN, HLD, CVA with residual LLE weakness, DM, recently quit smoking (2 months ago) presented to the ED for SOB and LE and UE edema.          57 year old female with PMH of COPD on 4 L home O2, CORNELIUS on CPAP, HFrEF (19% on TTE 02/2022, life vest at home), pulmonary HTN, CAD s/p PCI to RCA, HTN, HLD, CVA with residual LLE weakness, DM, active smoker on home O2, presented to the ED for SOB and LE and UE edema.  Notably, patient was here in 4/2022, but requested to be discharged prior to getting an AICD placed. in the interim, she had the AICD placed at [facility].   Patient began to experience SOB, and swelling in her arms and legs. SOB has worsened over the last 3 days. Additionally, she noticed pain and "leaking" from the AICD site. She denies fevers, chills, chest pain, or palpitations.     ED Course:   /98, HR 97.6, 96% on 4L, HR 98  CXR on my read shows bilateral congestion   trop 0.03, BNP 26,000    Patient admitted for CHrEF Exacerbation, and  suspected Cellulitis from AICD site.     Med Rec is not completed; Patient uses [facility] pharmacy is closed, and reports she does not know any of the meds she takes (even aspirin or water pill), she takes them from an unmarked blister pack; Pharmacy med rec may also be inaccurate, her meds were adjusted recently at a nursing home, per patient. Current meds added from sure scripts and prior admissions          57 year old female with PMH of COPD on 4 L home O2, CORNELIUS on CPAP, HFrEF (19% on TTE 02/2022, life vest at home), pulmonary HTN, CAD s/p PCI to RCA, HTN, HLD, CVA with residual LLE weakness, DM, active smoker on home O2 4L, presented to the ED for SOB and LE and UE edema. Notably, patient was here in 4/2022, but requested to be discharged prior to getting an AICD placed. in the interim, she had the AICD placed at [facility]. Patient began to experience SOB, and swelling in her arms and legs. SOB has worsened over the last 3 days. Pt states that she stopped her diuretic recently. Additionally, she noticed pain and "leaking" from the AICD site since it was placed 2 months ago associated with left UE swelling. Noted pt was also prescribed fluconazole and rifampin for 30 days supply from Oct 19, 2022 from [facility]. She denies fevers, chills, chest pain, or palpitations. Pt states that she was in SNF for 4 months and was discharged home with [facility] 3 weeks ago. Pt is currently dependent on motorized wheelchair and only able to stand on short period using walker.    ED Course:   /98, HR 97.6, 96% on 4L, HR 98  CXR on my read shows bilateral congestion   trop 0.03, BNP 26,000    Patient admitted for CHrEF Exacerbation, and  suspected Cellulitis from AICD site.     Med Rec is not completed; Patient uses [facility] pharmacy is closed, and reports she does not know any of the meds she takes (even aspirin or water pill), she takes them from an unmarked blister pack; Pharmacy med rec may also be inaccurate, her meds were adjusted recently at a nursing home, per patient. Current meds added from sure scripts and prior admissions

## 2022-11-19 NOTE — CONSULT NOTE ADULT - ASSESSMENT
PMD Dr Charles  EP Dr Taylor    57y Female with h/o COPD on 3L home O2, CORNELIUS on CPAP, CVA with residual LLE weakness, DM, HTN, HLD, CAD, s/p PCI, pulmonary HTN, HFrEF, s/p BiV ICD [employer] 5/25/22, admitted with ICD site infection       ICD site infection  ICMP  HFrEF  CAD, PCI  COPD on 3L  HTN, HLD, DM      con't tele  device will require explantation  check serial blood cultures  site culture  ID consult  empiric abx pending cultures results  echocardiogram  YOKASTA to r/o vegitations  check CRP, ESR  consult CTS, Dr Snider for explantation  pending cultures and abx treatment course length will plan re-implantations vs lifevest  will interrogate device  will follow

## 2022-11-19 NOTE — H&P ADULT - NSHPREVIEWOFSYSTEMS_GEN_ALL_CORE
Denies fevers, chills  Denies chest pain, palpitations   Admits to SOB   Denies nausea vomiting diarrhea

## 2022-11-19 NOTE — ED PROVIDER NOTE - ATTENDING CONTRIBUTION TO CARE
56 yo f hx copd, angela, ch, pulm, htn, cad s/p pci, htn, hld, cva, dm  pt presents for eval of SOB and LE edema for the past 2 days. pt states she recently stopped her diuretic and developed LE edema. pt also complaining of LUE swelling and cough. no fever, chills, cp, sob.      vs 96% on 4L  gen- NAD, aaox3  card-rrr  lungs-mild tachypniae, lower lobe crackles  abd-sntnd, no guarding or rebound  neuro- full str/sensation, cn ii-xii grossly intact, normal coordination   MSK- anisarca w/ significant b/l LE edema and LUE extremity edema, mild LUE erythema    c/f volume overload/chf, r/o VTE  c/f LUE cellulitis, possible AICD site infection  labs, trop, bnp, cxr, va dupelx  will provide supportive care, serial exam and ED observation period

## 2022-11-19 NOTE — CONSULT NOTE ADULT - SUBJECTIVE AND OBJECTIVE BOX
Patient is a 57y old  Female who presents with a chief complaint of       HPI:  57 year old female with PMH of COPD on 4 L home O2, CORNELIUS on CPAP, HFrEF (19% on TTE 2022, life vest at home), pulmonary HTN, CAD s/p PCI to RCA, HTN, HLD, CVA with residual LLE weakness, DM, active smoker on home O2, presented to the ED for SOB and LE and UE edema.  Notably, patient was here in 2022, but requested to be discharged prior to getting an AICD placed. in the interim, she had the AICD placed at [facility].   Patient began to experience SOB, and swelling in her arms and legs. SOB has worsened over the last 3 days. Additionally, she noticed pain and "leaking" from the AICD site. She denies fevers, chills, chest pain, or palpitations.     ED Course:   /98, HR 97.6, 96% on 4L, HR 98  CXR on my read shows bilateral congestion   trop 0.03, BNP 26,000    Patient admitted for CHrEF Exacerbation, and  suspected Cellulitis from AICD site.     Med Rec is not completed; Patient uses [facility] pharmacy is closed, and reports she does not know any of the meds she takes (even aspirin or water pill), she takes them from an unmarked blister pack; Pharmacy med rec may also be inaccurate, her meds were adjusted recently at a nursing home, per patient. Current meds added from sure scripts and prior admissions          (2022 10:42)      Electrophysiology:  57y Female with h/o COPD on 3L home O2, CORNELIUS on CPAP, CVA with residual LLE weakness, DM, HTN, HLD, CAD, s/p PCI, pulmonary HTN, HFrEF (15-20%) was seen in  with recommendation for ICD as EF did not improved despite medical therapy, Patient did not want to wait for the procedure and left, did not follow up as out-patient. On 22 patient had BiV ICD placed by Dr Taylor in [facility].   Patient admitted today with swelling and drainage from the device site. Reports symptoms including purulent looking discharge started >2 months ago, she admits on scratching on incision.           REVIEW OF SYSTEMS    [x ] A ten-point review of systems was otherwise negative except as noted.  [ ] Due to altered mental status/intubation, subjective information were not able to be obtained from the patient. History was obtained, to the extent possible, from review of the chart and collateral sources of information.      PAST MEDICAL & SURGICAL HISTORY:  Hypertension      Hyperlipidemia      Anxiety and depression      COPD, severe      CHF (congestive heart failure)      Cerebrovascular accident (CVA)  Multiple      Type 2 diabetes mellitus      CKD (chronic kidney disease), stage II      No significant past surgical history          Home Medications:  atorvastatin 80 mg oral tablet: 1 tab(s) orally once a day (at bedtime) (2022 13:50)  fenofibrate 145 mg oral tablet: 1 tab(s) orally once a day (2022 13:50)  magnesium oxide 400 mg oral tablet: 1 tab(s) orally 3 times a day (with meals) (2022 12:37)  metoprolol succinate 100 mg oral tablet, extended release: 1 tab(s) orally once a day (2022 11:09)  pantoprazole 40 mg oral delayed release tablet: 1 tab(s) orally once a day (before a meal) (2021 12:10)  Synthroid 25 mcg (0.025 mg) oral tablet: 1 tab(s) orally once a day (2022 11:09)      Allergies:  No Known Allergies      FAMILY HISTORY:  FH: diabetes mellitus (Father, Mother)        SOCIAL HISTORY:    CIGARETTES: former smoker d/c    ALCOHOL: denies  MARIJUANA: denies  ILLICIT DRUGS: denies         PREVIOUS DIAGNOSTIC TESTING:      ECHO  FINDINGS:  < from: TTE Echo Complete w/o Contrast w/ Doppler (22 @ 11:09) >  Summary:   1. Left ventricular ejection fraction, by visual estimation, is 30 to   35%.   2. Mildly enlarged left atrium.   3. Mildly enlarged right atrium.   4. Mild mitral annular calcification.   5. Moderate mitral valve regurgitation.   6. Moderate tricuspid regurgitation.   7. Mild aortic regurgitation.   8. Sclerotic aortic valve with normal opening.    < end of copied text >    < from: TTE Echo Complete w/o Contrast w/ Doppler (22 @ 17:21) >  Summary:   1. LV Ejection Fraction by Urena's Method with a biplane EF of 19 %.   2. Severely decreased global left ventricular systolic function.   3. Dilated RV with moderately reduced systolic function.   4. Moderately enlarged left atrium.   5. Severely enlarged right atrium.   6. Sclerotic aorticvalve with normal opening.   7. Mild aortic regurgitation.   8. The mitral valve leaflets are tethered due to reduced systolic   function and elevated LVDP.   9. Moderate-to-severe mitral regurgitation.  10. Severe tricuspid regurgitation.  11. Estimated pulmonary artery systolic pressure is 53.7 mmHg assuming a   right atrial pressure of 15 mmHg, which is consistent with moderate   pulmonary hypertension.  12. Small pericardial effusion adjacent to the right atrium.    < end of copied text >      STRESS  FINDINGS:    CATHETERIZATION  FINDINGS:  21  Left Heart Catheterization:  LVEDP: mild elevation         LEFT HEART CATHETERIZATION                                    Left main normal   LAD:       mild disease                Diag: patent  Left Circumflex: minor irregularities  OM: normal   Right Coronary Artery: Prox mild disease, Mid 70-95% diffuse lesion , Distal minor irregularities  RPDA normal   RPL normal   DOMINANCE: Right  INTERVENTION  IVUS: RCA  Successful PCI to Mid RCA with balloon angioplasty and SYNERGY 3.5 x 28 mm     ELECTROPHYSIOLOGY STUDY  FINDINGS:    CAROTID ULTRASOUND:  FINDINGS    VENOUS DUPLEX SCAN:  FINDINGS:  < from: VA Duplex Upper Ext Vein Scan, Left (22 @ 09:27) >    Impression:    No evidence of deep or superficial thrombosis in the left upper extremity.    < end of copied text >    CHEST CT PULMONARY ANGIO with IV Contrast:  FINDINGS:      MEDICATIONS  (STANDING):  aspirin enteric coated 81 milliGRAM(s) Oral daily  atorvastatin 80 milliGRAM(s) Oral at bedtime  budesonide 160 MICROgram(s)/formoterol 4.5 MICROgram(s) Inhaler 2 Puff(s) Inhalation two times a day  cefepime   IVPB 2000 milliGRAM(s) IV Intermittent every 8 hours  clopidogrel Tablet 75 milliGRAM(s) Oral daily  divalproex  milliGRAM(s) Oral two times a day  enoxaparin Injectable 40 milliGRAM(s) SubCutaneous every 24 hours  furosemide   Injectable 40 milliGRAM(s) IV Push two times a day  insulin glargine Injectable (LANTUS) 5 Unit(s) SubCutaneous at bedtime  insulin lispro (ADMELOG) corrective regimen sliding scale   SubCutaneous three times a day before meals  insulin lispro Injectable (ADMELOG) 2 Unit(s) SubCutaneous three times a day before meals  pantoprazole    Tablet 40 milliGRAM(s) Oral before breakfast  sacubitril 97 mG/valsartan 103 mG 1 Tablet(s) Oral two times a day  spironolactone 50 milliGRAM(s) Oral daily  vancomycin  IVPB 1000 milliGRAM(s) IV Intermittent every 12 hours  zolpidem 5 milliGRAM(s) Oral at bedtime    MEDICATIONS  (PRN):  albuterol    90 MICROgram(s) HFA Inhaler 2 Puff(s) Inhalation every 6 hours PRN Bronchospasm      Vital Signs Last 24 Hrs  T(C): 36.6 (2022 15:29), Max: 36.6 (2022 07:50)  T(F): 97.8 (2022 15:29), Max: 97.9 (2022 07:50)  HR: 104 (2022 13:19) (94 - 104)  BP: 180/101 (2022 13:19) (146/92 - 180/101)  BP(mean): --  RR: 20 (2022 15:29) (20 - 22)  SpO2: 98% (2022 15:29) (96% - 98%)    Parameters below as of 2022 15:29  Patient On (Oxygen Delivery Method): nasal cannula  O2 Flow (L/min): 4      PHYSICAL EXAM:    GENERAL: In no apparent distress, well nourished, and hydrated.  HEAD:  Atraumatic, Normocephalic  EYES: EOMI, PERRLA, conjunctiva and sclera clear  NECK: Supple and normal thyroid.  No JVD or carotid bruit.  Carotid pulse is 2+ bilaterally.  HEART: Regular rate and rhythm; No murmurs, rubs, or gallops.       LCW ICD site, incision is open at uper lateral aspect, no drainage at this time, site swollen warm to the touch  PULMONARY: Clear to auscultation and perfusion.  No rales, wheezing, or rhonchi bilaterally.  ABDOMEN: Soft, Nontender, Nondistended; Bowel sounds present  EXTREMITIES:  2+ Peripheral Pulses, No clubbing, cyanosis, or edema  NEUROLOGICAL: Grossly nonfocal    I&O's Detail    Daily     Daily     INTERPRETATION OF TELEMETRY:    EC Lead ECG:   Ventricular Rate 97 BPM    Atrial Rate 97 BPM    P-R Interval 146 ms    QRS Duration 128 ms    Q-T Interval 390 ms    QTC Calculation(Bazett) 495 ms    P Axis 55 degrees    R Axis 180 degrees    T Axis 9 degrees    Diagnosis Line Normal sinus rhythm  Non-specific intra-ventricular conduction block  Abnormal ECG    Confirmed by Behuria, Supreeti ([id number]) on 2022 3:17:54 PM (22 @ 12:53)        LABS:                        12.5   5.51  )-----------( 177      ( 2022 11:29 )             39.5         136  |  100  |  15  ----------------------------<  160<H>  4.3   |  24  |  1.0    Ca    9.3      2022 11:29  Phos  3.5       Mg     1.7         TPro  7.3  /  Alb  4.2  /  TBili  1.6<H>  /  DBili  x   /  AST  22  /  ALT  10  /  AlkPhos  117<H>      CARDIAC MARKERS ( 2022 11:29 )  x     / 0.03 ng/mL / x     / x     / x      CARDIAC MARKERS ( 2022 00:50 )  x     / 0.03 ng/mL / x     / x     / x              BNPSerum Pro-Brain Natriuretic Peptide: 29245 pg/mL ( @ 00:50)        COVID-19 PCR: NotDetec (22 @ 23:46)        RADIOLOGY & ADDITIONAL STUDIES:

## 2022-11-19 NOTE — H&P ADULT - NSHPLABSRESULTS_GEN_ALL_CORE
12.9   7.11  )-----------( 199      ( 19 Nov 2022 00:50 )             40.1   11-19    135  |  100  |  16  ----------------------------<  150<H>  4.9   |  24  |  1.2    Ca    9.3      19 Nov 2022 00:50    TPro  7.4  /  Alb  3.9  /  TBili  1.5<H>  /  DBili  x   /  AST  26  /  ALT  11  /  AlkPhos  130<H>  11-19

## 2022-11-19 NOTE — H&P ADULT - ASSESSMENT
57 year old female with PMH of COPD on 4 L home O2, CORNELIUS on CPAP, HFrEF (19% on TTE 02/2022, life vest at home), pulmonary HTN, CAD s/p PCI to RCA, HTN, HLD, CVA with residual LLE weakness, DM, active smoker on home O2, presented to the ED for SOB and LE and UE edema.    Patient admitted for CHrEF Exacerbation, and  suspected Cellulitis from AICD site.     Med Rec is not completed; Patient uses [facility] pharmacy is closed, and reports she does not know any of the meds she takes (even aspirin or water pill), she takes them from an unmarked blister pack; Pharmacy med rec may also be inaccurate, her meds were adjusted recently at a nursing home, per patient and she doesn't have . Current meds added from sure scripts and prior admissions    # HFrEF - Exacerbation   - TTE 3/2022: 30-35% EF, moderate MR and TR  - BNP 26,000   - CXR on my read shows bilateral congestion   - f/u LE Duplex to r/o DVT  - repeat echo after AICD Placed   - EP Consult   - c/w entresto (HF exacerbation is warm and wet profile, no indication to hold)   - c/w Metoprolol 100mg ER (but confirm all meds again with pharmacy on monday)     # Cellulitis of AICD Insertion Site   - c/w cefepime and Vanc     # Chronic Hypoxemic Respiratory Failure   - multifactorial, from COPD + CHF   - c/w albuterol and symbicort and confirm home meds   - stable on 3.5L O2 (on 4L at home)     # Troponinemia   - likely from demand of HF exacerbation   - Trop 0.03  - trend repeat troponins  - followup EKG     # HTN   # DM   # DLD   - unknown home meds, verify with pharmacy (closed today)   - start high dose statin for now (was on it 4/2022 admission)   - c/w entresto (HF exacerbation is warm and wet profile, no indication to hold)     # DM   - hold home meds   - start lantus 5 lispro 2/2/2 and increase as needed     # suspect hypothyroidism   - records show patient picked up levothyroxine 25mcg   - start levothyrox 25mcg now, check TSH and med rec    #          57 year old female with PMH of COPD on 4 L home O2, CORNELIUS on CPAP, HFrEF (19% on TTE 02/2022, life vest at home), pulmonary HTN, CAD s/p PCI to RCA, HTN, HLD, CVA with residual LLE weakness, DM, active smoker on home O2, presented to the ED for SOB and LE and UE edema.    Patient admitted for CHrEF Exacerbation, and  suspected Cellulitis from AICD site.     Med Rec is not completed; Patient uses [facility] pharmacy is closed, and reports she does not know any of the meds she takes (even aspirin or water pill), she takes them from an unmarked blister pack; Pharmacy med rec may also be inaccurate, her meds were adjusted recently at a nursing home, per patient and she doesn't have . Current meds added from sure scripts and prior admissions    # HFrEF - Exacerbation   # CAD s/p PCI to RCA   - TTE 3/2022: 30-35% EF, moderate MR and TR  - BNP 26,000   - CXR on my read shows bilateral congestion   - f/u LE Duplex to r/o DVT  - repeat echo after AICD Placed   - EP Consult   - c/w entresto (HF exacerbation is warm and wet profile, no indication to hold)   - c/w Metoprolol 100mg ER (but confirm all meds again with pharmacy on monday)   - was on ASA and Plavix in 4/2022, will restart; confirm meds     # Cellulitis of AICD Insertion Site   - c/w cefepime and Vanc     # Chronic Hypoxemic Respiratory Failure   - multifactorial, from COPD + CHF   - c/w albuterol and symbicort and confirm home meds   - stable on 3.5L O2 (on 4L at home)     # Troponinemia   - likely from demand of HF exacerbation   - Trop 0.03  - trend repeat troponins  - followup EKG     # HTN   # DM   # DLD   - unknown home meds, verify with pharmacy (closed today)   - start high dose statin for now (was on it 4/2022 admission)   - c/w entresto (HF exacerbation is warm and wet profile, no indication to hold)     # Hx of CVA   - residual LE weakness   - c/w ASA, statin     # DM   - hold home meds   - start lantus 5 lispro 2/2/2 and increase as needed     # suspect hypothyroidism   - records show patient picked up levothyroxine 25mcg   - start levothyrox 25mcg now, check TSH and med rec    # Suspect Seizure disorder   - divalproex picked up on [facility]   - will start 250 bid for now, check valproic acid level and med rec     # CORNELIUS   # COPD   - advise to discontinue smoking on O2   - start albuterol and symbicort     DVT ppx: Lovenox, f/u duplex   GI PPx: Protonix   Diet: DASH  Dispo: To Tele              57 year old female with PMH of COPD on 4 L home O2, CORNELIUS on CPAP, HFrEF (19% on TTE 02/2022, life vest at home), pulmonary HTN, CAD s/p PCI to RCA, HTN, HLD, CVA with residual LLE weakness, DM, active smoker on home O2, presented to the ED for SOB and LE and UE edema.    Patient admitted for CHrEF Exacerbation, and  suspected Cellulitis from AICD site.     Med Rec is not completed; Patient uses [facility], and reports she does not know any of the meds she takes (even aspirin or water pill), she takes them from an unmarked blister pack; Pharmacy med rec may also be inaccurate, her meds were adjusted recently at a nursing home, per patient and she doesn't have . Current meds added from sure scripts and prior admissions    # HFrEF - Exacerbation   # CAD s/p PCI to RCA   - TTE 3/2022: 30-35% EF, moderate MR and TR  - BNP 26,000   - CXR on my read shows bilateral congestion   - f/u LE Duplex to r/o DVT  - repeat echo after AICD Placed   - EP Consult   - c/w entresto (HF exacerbation is warm and wet profile, no indication to hold)   - c/w Metoprolol 100mg ER (but confirm all meds again with pharmacy on monday)   - was on ASA and Plavix in 4/2022, will restart; confirm meds   - lasix IV 40 bid, dose discussed with attending     # Cellulitis of AICD Insertion Site   - c/w cefepime and Vanc  - swab site, ID Consult     # Chronic Hypoxemic Respiratory Failure   - multifactorial, from COPD + CHF   - c/w albuterol and symbicort and confirm home meds   - stable on 3.5L O2 (on 4L at home)     # Troponinemia   - likely from demand of HF exacerbation   - Trop 0.03  - trend repeat troponins  - followup EKG     # HTN   # DM   # DLD   - unknown home meds, verify with pharmacy (closed today)   - start high dose statin for now (was on it 4/2022 admission)   - c/w entresto (HF exacerbation is warm and wet profile, no indication to hold)     # Hx of CVA   - residual LE weakness   - c/w ASA, statin     # DM   - hold home meds   - start lantus 5 lispro 2/2/2 and increase as needed     # suspect hypothyroidism   - records show patient picked up levothyroxine 25mcg   - start levothyrox 25mcg now, check TSH and med rec    # Suspect Seizure disorder   - divalproex picked up on [facility]   - will start 250 bid for now, check valproic acid level and med rec     # CORNELIUS   # COPD   - advised to discontinue smoking on O2   - start albuterol and symbicort     DVT ppx: Lovenox, f/u duplex   GI PPx: Protonix   Diet: DASH  Dispo: To Tele

## 2022-11-19 NOTE — PHARMACOTHERAPY INTERVENTION NOTE - COMMENTS
As per policy, ordered a vancomycin trough for 11/21 with AM labs since patient would be due for a trough prior to the fourth dose on 1g IV q12h.    Anastacio Buck, PharmD, [facility]  Clinical Pharmacy Specialist, Infectious Diseases  Tele-Antimicrobial Stewardship Program (Tele-ASP)  Tele-ASP Phone: [phone]

## 2022-11-19 NOTE — ED PROVIDER NOTE - NS ED ROS FT
Constitutional: No fevers, chills, or malaise.  HEENT: No headache, visual changes  Cardiac: Reports SOB, leg edema, LUE edema.  No chest pain or leg pain.  Respiratory: Reports cough. No hemoptysis.  GI:  No nausea, vomiting, diarrhea, or abdominal pain.  :  No dysuria, frequency, or urgency.  MS:  No myalgia, muscle weakness, joint pain or back pain.  Neuro:  No dizziness, LOC, paralysis, or N/T.  Skin:  No skin rash.   Endocrine: No polyuria, polyphagia, or polydipsia.

## 2022-11-19 NOTE — ED PROVIDER NOTE - CLINICAL SUMMARY MEDICAL DECISION MAKING FREE TEXT BOX
labs significant for elevated bnp at 26k, mildly elevated trop, xr w/ b/l interstitial markings  pt given abx for cellulitis vs pocket infection  will admit to medicine for diuresis and further w/u

## 2022-11-20 LAB
-  COAGULASE NEGATIVE STAPHYLOCOCCUS: SIGNIFICANT CHANGE UP
-  STREPTOCOCCUS SP. (NOT GRP A, B OR S PNEUMONIAE): SIGNIFICANT CHANGE UP
ALBUMIN SERPL ELPH-MCNC: 3.6 G/DL — SIGNIFICANT CHANGE UP (ref 3.5–5.2)
ALP SERPL-CCNC: 113 U/L — SIGNIFICANT CHANGE UP (ref 30–115)
ALT FLD-CCNC: 9 U/L — SIGNIFICANT CHANGE UP (ref 0–41)
ANION GAP SERPL CALC-SCNC: 11 MMOL/L — SIGNIFICANT CHANGE UP (ref 7–14)
APTT BLD: 38.7 SEC — SIGNIFICANT CHANGE UP (ref 27–39.2)
AST SERPL-CCNC: 23 U/L — SIGNIFICANT CHANGE UP (ref 0–41)
BASOPHILS # BLD AUTO: 0.1 K/UL — SIGNIFICANT CHANGE UP (ref 0–0.2)
BASOPHILS NFR BLD AUTO: 1.7 % — HIGH (ref 0–1)
BILIRUB SERPL-MCNC: 1.9 MG/DL — HIGH (ref 0.2–1.2)
BLD GP AB SCN SERPL QL: SIGNIFICANT CHANGE UP
BUN SERPL-MCNC: 13 MG/DL — SIGNIFICANT CHANGE UP (ref 10–20)
CALCIUM SERPL-MCNC: 9.3 MG/DL — SIGNIFICANT CHANGE UP (ref 8.4–10.4)
CHLORIDE SERPL-SCNC: 102 MMOL/L — SIGNIFICANT CHANGE UP (ref 98–110)
CO2 SERPL-SCNC: 27 MMOL/L — SIGNIFICANT CHANGE UP (ref 17–32)
CREAT SERPL-MCNC: 1.1 MG/DL — SIGNIFICANT CHANGE UP (ref 0.7–1.5)
EGFR: 59 ML/MIN/1.73M2 — LOW
EOSINOPHIL # BLD AUTO: 0.18 K/UL — SIGNIFICANT CHANGE UP (ref 0–0.7)
EOSINOPHIL NFR BLD AUTO: 3.1 % — SIGNIFICANT CHANGE UP (ref 0–8)
GLUCOSE BLDC GLUCOMTR-MCNC: 101 MG/DL — HIGH (ref 70–99)
GLUCOSE BLDC GLUCOMTR-MCNC: 162 MG/DL — HIGH (ref 70–99)
GLUCOSE BLDC GLUCOMTR-MCNC: 67 MG/DL — LOW (ref 70–99)
GLUCOSE BLDC GLUCOMTR-MCNC: 81 MG/DL — SIGNIFICANT CHANGE UP (ref 70–99)
GLUCOSE BLDC GLUCOMTR-MCNC: 90 MG/DL — SIGNIFICANT CHANGE UP (ref 70–99)
GLUCOSE BLDC GLUCOMTR-MCNC: 96 MG/DL — SIGNIFICANT CHANGE UP (ref 70–99)
GLUCOSE SERPL-MCNC: 83 MG/DL — SIGNIFICANT CHANGE UP (ref 70–99)
GRAM STN FLD: SIGNIFICANT CHANGE UP
HCT VFR BLD CALC: 38.8 % — SIGNIFICANT CHANGE UP (ref 37–47)
HGB BLD-MCNC: 11.9 G/DL — LOW (ref 12–16)
IMM GRANULOCYTES NFR BLD AUTO: 0.2 % — SIGNIFICANT CHANGE UP (ref 0.1–0.3)
INR BLD: 1.16 RATIO — SIGNIFICANT CHANGE UP (ref 0.65–1.3)
LYMPHOCYTES # BLD AUTO: 0.82 K/UL — LOW (ref 1.2–3.4)
LYMPHOCYTES # BLD AUTO: 14.3 % — LOW (ref 20.5–51.1)
MAGNESIUM SERPL-MCNC: 1.5 MG/DL — LOW (ref 1.8–2.4)
MCHC RBC-ENTMCNC: 28.9 PG — SIGNIFICANT CHANGE UP (ref 27–31)
MCHC RBC-ENTMCNC: 30.7 G/DL — LOW (ref 32–37)
MCV RBC AUTO: 94.2 FL — SIGNIFICANT CHANGE UP (ref 81–99)
METHOD TYPE: SIGNIFICANT CHANGE UP
MONOCYTES # BLD AUTO: 0.73 K/UL — HIGH (ref 0.1–0.6)
MONOCYTES NFR BLD AUTO: 12.8 % — HIGH (ref 1.7–9.3)
NEUTROPHILS # BLD AUTO: 3.88 K/UL — SIGNIFICANT CHANGE UP (ref 1.4–6.5)
NEUTROPHILS NFR BLD AUTO: 67.9 % — SIGNIFICANT CHANGE UP (ref 42.2–75.2)
NRBC # BLD: 0 /100 WBCS — SIGNIFICANT CHANGE UP (ref 0–0)
PHOSPHATE SERPL-MCNC: 3.1 MG/DL — SIGNIFICANT CHANGE UP (ref 2.1–4.9)
PLATELET # BLD AUTO: 171 K/UL — SIGNIFICANT CHANGE UP (ref 130–400)
POTASSIUM SERPL-MCNC: 4 MMOL/L — SIGNIFICANT CHANGE UP (ref 3.5–5)
POTASSIUM SERPL-SCNC: 4 MMOL/L — SIGNIFICANT CHANGE UP (ref 3.5–5)
PROT SERPL-MCNC: 6.9 G/DL — SIGNIFICANT CHANGE UP (ref 6–8)
PROTHROM AB SERPL-ACNC: 13.3 SEC — HIGH (ref 9.95–12.87)
RBC # BLD: 4.12 M/UL — LOW (ref 4.2–5.4)
RBC # FLD: 19.5 % — HIGH (ref 11.5–14.5)
SODIUM SERPL-SCNC: 140 MMOL/L — SIGNIFICANT CHANGE UP (ref 135–146)
SPECIMEN SOURCE: SIGNIFICANT CHANGE UP
TROPONIN T SERPL-MCNC: 0.03 NG/ML — CRITICAL HIGH
WBC # BLD: 5.72 K/UL — SIGNIFICANT CHANGE UP (ref 4.8–10.8)
WBC # FLD AUTO: 5.72 K/UL — SIGNIFICANT CHANGE UP (ref 4.8–10.8)

## 2022-11-20 PROCEDURE — 99221 1ST HOSP IP/OBS SF/LOW 40: CPT

## 2022-11-20 PROCEDURE — 93970 EXTREMITY STUDY: CPT | Mod: 26

## 2022-11-20 PROCEDURE — 93284 PRGRMG EVAL IMPLANTABLE DFB: CPT | Mod: 26

## 2022-11-20 PROCEDURE — 99233 SBSQ HOSP IP/OBS HIGH 50: CPT

## 2022-11-20 PROCEDURE — 71045 X-RAY EXAM CHEST 1 VIEW: CPT | Mod: 26

## 2022-11-20 RX ORDER — LABETALOL HCL 100 MG
10 TABLET ORAL ONCE
Refills: 0 | Status: COMPLETED | OUTPATIENT
Start: 2022-11-20 | End: 2022-11-20

## 2022-11-20 RX ORDER — MAGNESIUM SULFATE 500 MG/ML
2 VIAL (ML) INJECTION EVERY 4 HOURS
Refills: 0 | Status: COMPLETED | OUTPATIENT
Start: 2022-11-20 | End: 2022-11-20

## 2022-11-20 RX ORDER — INFLUENZA VIRUS VACCINE 15; 15; 15; 15 UG/.5ML; UG/.5ML; UG/.5ML; UG/.5ML
0.5 SUSPENSION INTRAMUSCULAR ONCE
Refills: 0 | Status: DISCONTINUED | OUTPATIENT
Start: 2022-11-20 | End: 2022-12-06

## 2022-11-20 RX ADMIN — Medication 2 UNIT(S): at 18:39

## 2022-11-20 RX ADMIN — SPIRONOLACTONE 50 MILLIGRAM(S): 25 TABLET, FILM COATED ORAL at 05:24

## 2022-11-20 RX ADMIN — Medication 40 MILLIGRAM(S): at 05:25

## 2022-11-20 RX ADMIN — Medication 25 GRAM(S): at 18:45

## 2022-11-20 RX ADMIN — Medication 40 MILLIGRAM(S): at 13:13

## 2022-11-20 RX ADMIN — ATORVASTATIN CALCIUM 80 MILLIGRAM(S): 80 TABLET, FILM COATED ORAL at 21:21

## 2022-11-20 RX ADMIN — PANTOPRAZOLE SODIUM 40 MILLIGRAM(S): 20 TABLET, DELAYED RELEASE ORAL at 07:04

## 2022-11-20 RX ADMIN — Medication 25 GRAM(S): at 15:17

## 2022-11-20 RX ADMIN — BUDESONIDE AND FORMOTEROL FUMARATE DIHYDRATE 2 PUFF(S): 160; 4.5 AEROSOL RESPIRATORY (INHALATION) at 19:48

## 2022-11-20 RX ADMIN — DIVALPROEX SODIUM 250 MILLIGRAM(S): 500 TABLET, DELAYED RELEASE ORAL at 05:25

## 2022-11-20 RX ADMIN — CEFEPIME 100 MILLIGRAM(S): 1 INJECTION, POWDER, FOR SOLUTION INTRAMUSCULAR; INTRAVENOUS at 21:21

## 2022-11-20 RX ADMIN — Medication 100 MILLIGRAM(S): at 05:25

## 2022-11-20 RX ADMIN — CEFEPIME 100 MILLIGRAM(S): 1 INJECTION, POWDER, FOR SOLUTION INTRAMUSCULAR; INTRAVENOUS at 13:13

## 2022-11-20 RX ADMIN — ZOLPIDEM TARTRATE 5 MILLIGRAM(S): 10 TABLET ORAL at 21:21

## 2022-11-20 RX ADMIN — Medication 250 MILLIGRAM(S): at 05:25

## 2022-11-20 RX ADMIN — Medication 250 MILLIGRAM(S): at 18:44

## 2022-11-20 RX ADMIN — CEFEPIME 100 MILLIGRAM(S): 1 INJECTION, POWDER, FOR SOLUTION INTRAMUSCULAR; INTRAVENOUS at 05:25

## 2022-11-20 RX ADMIN — POLYETHYLENE GLYCOL 3350 17 GRAM(S): 17 POWDER, FOR SOLUTION ORAL at 11:46

## 2022-11-20 RX ADMIN — POLYETHYLENE GLYCOL 3350 17 GRAM(S): 17 POWDER, FOR SOLUTION ORAL at 00:06

## 2022-11-20 RX ADMIN — Medication 1: at 18:40

## 2022-11-20 RX ADMIN — DIVALPROEX SODIUM 250 MILLIGRAM(S): 500 TABLET, DELAYED RELEASE ORAL at 19:19

## 2022-11-20 RX ADMIN — SACUBITRIL AND VALSARTAN 1 TABLET(S): 24; 26 TABLET, FILM COATED ORAL at 19:19

## 2022-11-20 RX ADMIN — ZOLPIDEM TARTRATE 5 MILLIGRAM(S): 10 TABLET ORAL at 21:24

## 2022-11-20 RX ADMIN — Medication 81 MILLIGRAM(S): at 12:02

## 2022-11-20 RX ADMIN — SACUBITRIL AND VALSARTAN 1 TABLET(S): 24; 26 TABLET, FILM COATED ORAL at 05:24

## 2022-11-20 NOTE — CONSULT NOTE ADULT - SUBJECTIVE AND OBJECTIVE BOX
Surgeon: Dr. Shelley/ Agatha / Joyce    Consult requesting by: Osiris   Primary  Charles R    HISTORY OF PRESENT ILLNESS:    57y Female with h/o COPD on 3L home O2, CONRELIUS on CPAP, CVA with residual LLE weakness, DM, HTN, HLD, CAD, s/p PCI, pulmonary HTN, HFrEF (15-20%) was seen in 4/22 with recommendation for ICD as EF did not improved despite medical therapy, Patient did not want to wait for the procedure and left, did not follow up as out-patient. On 5/25/22 patient had BiV ICD placed by Dr Taylor in [facility].   Patient admitted today with swelling and drainage from the device site. Reports symptoms including purulent looking discharge started >2 months ago, she admits on scratching on. CTS consulted for explantation      Home Medications:  Unable to be verified  atorvastatin 80 mg oral tablet: 1 tab(s) orally once a day (at bedtime) (16 Feb 2022 13:50)  fenofibrate 145 mg oral tablet: 1 tab(s) orally once a day (16 Feb 2022 13:50)  magnesium oxide 400 mg oral tablet: 1 tab(s) orally 3 times a day (with meals) (14 Jan 2022 12:37)  metoprolol succinate 100 mg oral tablet, extended release: 1 tab(s) orally once a day (19 Nov 2022 11:09)  pantoprazole 40 mg oral delayed release tablet: 1 tab(s) orally once a day (before a meal) (16 Nov 2021 12:10)  Synthroid 25 mcg (0.025 mg) oral tablet: 1 tab(s) orally once a day (19 Nov 2022 11:09)        NYHA functional class    [ ] Class I (no limitation) [ ] Class II (slight limitation) [ ] Class III (marked limitation) [ ] Class IV (symptoms at rest)    PAST MEDICAL & SURGICAL HISTORY:  Hypertension      Hyperlipidemia      Anxiety and depression      COPD, severe      CHF (congestive heart failure)      Cerebrovascular accident (CVA)  Multiple      Type 2 diabetes mellitus      CKD (chronic kidney disease), stage II      No significant past surgical history          MEDICATIONS  (STANDING):  aspirin enteric coated 81 milliGRAM(s) Oral daily  atorvastatin 80 milliGRAM(s) Oral at bedtime  budesonide 160 MICROgram(s)/formoterol 4.5 MICROgram(s) Inhaler 2 Puff(s) Inhalation two times a day  cefepime   IVPB 2000 milliGRAM(s) IV Intermittent every 8 hours  divalproex  milliGRAM(s) Oral two times a day  enoxaparin Injectable 40 milliGRAM(s) SubCutaneous every 24 hours  furosemide   Injectable 40 milliGRAM(s) IV Push two times a day  insulin glargine Injectable (LANTUS) 5 Unit(s) SubCutaneous at bedtime  insulin lispro (ADMELOG) corrective regimen sliding scale   SubCutaneous three times a day before meals  insulin lispro Injectable (ADMELOG) 2 Unit(s) SubCutaneous three times a day before meals  metoprolol succinate  milliGRAM(s) Oral daily  pantoprazole    Tablet 40 milliGRAM(s) Oral before breakfast  polyethylene glycol 3350 17 Gram(s) Oral daily  sacubitril 49 mG/valsartan 51 mG 1 Tablet(s) Oral two times a day  spironolactone 50 milliGRAM(s) Oral daily  vancomycin  IVPB 1000 milliGRAM(s) IV Intermittent every 12 hours  zolpidem 5 milliGRAM(s) Oral at bedtime    MEDICATIONS  (PRN):  albuterol    90 MICROgram(s) HFA Inhaler 2 Puff(s) Inhalation every 6 hours PRN Bronchospasm  zolpidem 5 milliGRAM(s) Oral at bedtime PRN Insomnia    Antiplatelet therapy:    unknown                       Last dose/amt:    Allergies    No Known Allergies    Intolerances    SOCIAL HISTORY:  Smoker: [ ] Yes  [ ] No        PACK YEARS:                         WHEN QUIT?  ETOH use: [ ] Yes  [ ] No              FREQUENCY / QUANTITY:  Ilicit Drug use:  [ ] Yes  [ ] No  Occupation:  Lives with:  Assisted device use:    FAMILY HISTORY:  FH: diabetes mellitus (Father, Mother)    Review of Systems  CONSTITUTIONAL: no fever, chills or night sweats]   NEURO:  denies seizures, paralysis or paresthesias                                                                                EYES: wears glasses, no double vision, no blurry vision                                                                          ENMT: no difficulty hearing, vertigo, dysphagia, epistaxis recent dental work [ ]                                      CV:  denies chest pain, ORR or palpatations at current time                                                                                            RESPIRATORY:  no cough or hemoptysis                                                                 GI:  no nausea, vomiting, constipation or diarrhea   : denies dysuria, hematuria, incontinence or retention                                                                                           MUSKULOSKELETAL:  denies joint swelling or muscle weakness  PSYCH:  denies dementia, depression, anxiety   ENDOCRINE:  cold intolerance[ ] heat intolerance[ ] polydipsia[ ]                                                                                                                                                                                                PHYSICAL EXAM  Vital Signs Last 24 Hrs  T(C): 36.8 (20 Nov 2022 09:24), Max: 36.8 (19 Nov 2022 23:42)  T(F): 98.3 (20 Nov 2022 09:24), Max: 98.3 (20 Nov 2022 09:24)  HR: 99 (20 Nov 2022 09:24) (93 - 100)  BP: 144/72 (20 Nov 2022 09:24) (124/71 - 173/91)  RR: 20 (20 Nov 2022 09:24) (19 - 20)  SpO2: 100% (20 Nov 2022 09:24) (96% - 100%)    Parameters below as of 20 Nov 2022 09:24  Patient On (Oxygen Delivery Method): nasal cannula  O2 Flow (L/min): 4    CONSTITUTIONAL:    well nourished, well developed, overweight in NAD                                                                       Neuro: oriented to person/place & time with no focal motor or sensory  deficits     Eyes: PERRLA, EOMI, no nystagmus, sclera anicteric  ENT:  nasal/oral mucosa with absence of cyanosis, fair dentition  Neck: no jugular vein distention, trachea midline, no goiter   Chest: bilateral breath sounds with good air movement absence of wheezes, rales, or rhonchi                                                                           CV:  RSR, S1S2, no significant murmur appreciated  Carotids: No Bruits  GI:  soft, non-tender non-distended, + bowel sounds                                                                                                          Extremities:  no evidence of cyanosis or deformity, no pedal edema   Extremity Pulses: right / left:  femorals 2+/ 2+; DP's 2+/2+; radials 2+/2+    negative Allens  SKIN : no rashes                                                          LABS:                        11.9   5.72  )-----------( 171      ( 20 Nov 2022 06:35 )             38.8     11-20    140  |  102  |  13  ----------------------------<  83  4.0   |  27  |  1.1    Ca    9.3      20 Nov 2022 06:35  Phos  3.1     11-20  Mg     1.5     11-20    TPro  6.9  /  Alb  3.6  /  TBili  1.9<H>  /  DBili  x   /  AST  23  /  ALT  9   /  AlkPhos  113  11-20    PT/INR - ( 20 Nov 2022 06:35 )   PT: 13.30 sec;   INR: 1.16 ratio         PTT - ( 20 Nov 2022 06:35 )  PTT:38.7 sec    CARDIAC MARKERS ( 20 Nov 2022 06:35 )  x     / 0.03 ng/mL / x     / x     / x      CARDIAC MARKERS ( 19 Nov 2022 22:09 )  x     / 0.02 ng/mL / x     / x     / x      CARDIAC MARKERS ( 19 Nov 2022 11:29 )  x     / 0.03 ng/mL / x     / x     / x      CARDIAC MARKERS ( 19 Nov 2022 00:50 )  x     / 0.03 ng/mL / x     / x     / x        COVID-19: NotDetec (11-18-22 @ 23:46)      Assessment/ Plan:  56yo debilitated Female (motorized wheelchair) with h/o COPD on 3L home O2, CORNELIUS on CPAP, CVA with residual LLE weakness, DM, HTN, HLD, CAD, s/p PCI, pulmonary HTN, HFrEF, s/p BiV ICD Med[employer]onic 5/25/22, admitted with ICD site infection.  Will need removal of device and deep tissue culture       device will require explantation  will put on schedule for tomorrow afternoon  NPO after midnight  No anticoagulation till after procedure  DVT prophylaxis with SCD's  Attending note to follow               Surgeon: Dr. Shelley/ Agatha / Joyce    Consult requesting by: Osiris   Primary  Charles R    HISTORY OF PRESENT ILLNESS:    57y Female with h/o COPD on 3L home O2, CORNELIUS on CPAP, CVA with residual LLE weakness, DM, HTN, HLD, CAD, s/p PCI, pulmonary HTN, HFrEF (15-20%) was seen in 4/22 with recommendation for ICD as EF did not improved despite medical therapy, Patient did not want to wait for the procedure and left, did not follow up as out-patient. On 5/25/22 patient had BiV ICD placed by Dr Taylor in [facility].   Patient admitted today with swelling and drainage from the device site. Reports symptoms including purulent looking discharge started >2 months ago, she admits on scratching on. CTS consulted for explantation      Home Medications:  Unable to be verified  atorvastatin 80 mg oral tablet: 1 tab(s) orally once a day (at bedtime) (16 Feb 2022 13:50)  fenofibrate 145 mg oral tablet: 1 tab(s) orally once a day (16 Feb 2022 13:50)  magnesium oxide 400 mg oral tablet: 1 tab(s) orally 3 times a day (with meals) (14 Jan 2022 12:37)  metoprolol succinate 100 mg oral tablet, extended release: 1 tab(s) orally once a day (19 Nov 2022 11:09)  pantoprazole 40 mg oral delayed release tablet: 1 tab(s) orally once a day (before a meal) (16 Nov 2021 12:10)  Synthroid 25 mcg (0.025 mg) oral tablet: 1 tab(s) orally once a day (19 Nov 2022 11:09)        NYHA functional class    [ ] Class I (no limitation) [ ] Class II (slight limitation) [ ] Class III (marked limitation) [ ] Class IV (symptoms at rest)    PAST MEDICAL & SURGICAL HISTORY:  Hypertension      Hyperlipidemia      Anxiety and depression      COPD, severe      CHF (congestive heart failure)      Cerebrovascular accident (CVA)  Multiple      Type 2 diabetes mellitus      CKD (chronic kidney disease), stage II      ORIF right ankle    MEDICATIONS  (STANDING):  aspirin enteric coated 81 milliGRAM(s) Oral daily  atorvastatin 80 milliGRAM(s) Oral at bedtime  budesonide 160 MICROgram(s)/formoterol 4.5 MICROgram(s) Inhaler 2 Puff(s) Inhalation two times a day  cefepime   IVPB 2000 milliGRAM(s) IV Intermittent every 8 hours  divalproex  milliGRAM(s) Oral two times a day  enoxaparin Injectable 40 milliGRAM(s) SubCutaneous every 24 hours  furosemide   Injectable 40 milliGRAM(s) IV Push two times a day  insulin glargine Injectable (LANTUS) 5 Unit(s) SubCutaneous at bedtime  insulin lispro (ADMELOG) corrective regimen sliding scale   SubCutaneous three times a day before meals  insulin lispro Injectable (ADMELOG) 2 Unit(s) SubCutaneous three times a day before meals  metoprolol succinate  milliGRAM(s) Oral daily  pantoprazole    Tablet 40 milliGRAM(s) Oral before breakfast  polyethylene glycol 3350 17 Gram(s) Oral daily  sacubitril 49 mG/valsartan 51 mG 1 Tablet(s) Oral two times a day  spironolactone 50 milliGRAM(s) Oral daily  vancomycin  IVPB 1000 milliGRAM(s) IV Intermittent every 12 hours  zolpidem 5 milliGRAM(s) Oral at bedtime    MEDICATIONS  (PRN):  albuterol    90 MICROgram(s) HFA Inhaler 2 Puff(s) Inhalation every 6 hours PRN Bronchospasm  zolpidem 5 milliGRAM(s) Oral at bedtime PRN Insomnia    Antiplatelet therapy:    unknown                       Last dose/amt:    Allergies    No Known Allergies    Intolerances    SOCIAL HISTORY:  Denies recent alcohol, drug or tobacco abuse  Occupation: disabled  Assisted device use: motorized wheel chair    FAMILY HISTORY:  FH: diabetes mellitus (Father, Mother)    Review of Systems  CONSTITUTIONAL: no fever, chills or night sweats]   NEURO:  denies seizures, paralysis or + paresthesias                                                                                EYES: wears glasses, no double vision, no blurry vision                                                                          ENMT: no difficulty hearing, vertigo, dysphagia, epistaxis recent dental work [ ]                                      CV:  denies chest pain, + ORR no palpatations at current time                                                                                            RESPIRATORY:  no cough or hemoptysis, always SOB                                                                 GI:  no nausea, vomiting, constipation or diarrhea   : denies dysuria, hematuria, incontinence or retention                                                                                           MUSKULOSKELETAL:  denies joint swelling + muscle weakness  PSYCH:  denies dementia, depression, anxiety   ENDOCRINE:  cold intolerance[ ] heat intolerance[ ] polydipsia[ ]                                                                                                                                                                                                PHYSICAL EXAM  Vital Signs Last 24 Hrs  T(C): 36.8 (20 Nov 2022 09:24), Max: 36.8 (19 Nov 2022 23:42)  T(F): 98.3 (20 Nov 2022 09:24), Max: 98.3 (20 Nov 2022 09:24)  HR: 99 (20 Nov 2022 09:24) (93 - 100)  BP: 144/72 (20 Nov 2022 09:24) (124/71 - 173/91)  RR: 20 (20 Nov 2022 09:24) (19 - 20)  SpO2: 100% (20 Nov 2022 09:24) (96% - 100%)    Parameters below as of 20 Nov 2022 09:24  Patient On (Oxygen Delivery Method): nasal cannula  O2 Flow (L/min): 4    CONSTITUTIONAL:    well nourished, well developed, overweight in NAD                                                                       Neuro: oriented to person/place & time with limited ROM and strength bilateral lower extremities     Eyes: PERRLA, EOMI, no nystagmus, sclera anicteric  ENT:  nasal/oral mucosa with absence of cyanosis, poor dentition  Neck: no jugular vein distention, trachea midline, no goiter   Chest: bilateral breath shallow sounds with fair air movement  ? rales, left chest wound cellulitic, tender with fluid around device                                                                           CV:  RSR, S1S2, no significant murmur appreciated  GI:  soft, non-tender non-distended, protuberant + bowel sounds                                                                                                          Extremities:  no evidence of cyanosis or deformity, + pedal edema   Extremity Pulses: right / left:  DP's 2+/2+; radials 2+/2+     SKIN : no rashes                                                          LABS:                        11.9   5.72  )-----------( 171      ( 20 Nov 2022 06:35 )             38.8     11-20    140  |  102  |  13  ----------------------------<  83  4.0   |  27  |  1.1    Ca    9.3      20 Nov 2022 06:35  Phos  3.1     11-20  Mg     1.5     11-20    TPro  6.9  /  Alb  3.6  /  TBili  1.9<H>  /  DBili  x   /  AST  23  /  ALT  9   /  AlkPhos  113  11-20    PT/INR - ( 20 Nov 2022 06:35 )   PT: 13.30 sec;   INR: 1.16 ratio         PTT - ( 20 Nov 2022 06:35 )  PTT:38.7 sec    CARDIAC MARKERS ( 20 Nov 2022 06:35 )  x     / 0.03 ng/mL / x     / x     / x      CARDIAC MARKERS ( 19 Nov 2022 22:09 )  x     / 0.02 ng/mL / x     / x     / x      CARDIAC MARKERS ( 19 Nov 2022 11:29 )  x     / 0.03 ng/mL / x     / x     / x      CARDIAC MARKERS ( 19 Nov 2022 00:50 )  x     / 0.03 ng/mL / x     / x     / x        COVID-19: NotDetec (11-18-22 @ 23:46)      Assessment/ Plan:  56yo debilitated Female (motorized wheelchair) with h/o COPD on 3L home O2, CORNELIUS on CPAP, CVA with residual LLE weakness, DM, HTN, HLD, CAD, s/p PCI, pulmonary HTN, HFrEF, s/p BiV ICD [employer] 5/25/22, admitted with ICD site infection.  Will need removal of device and deep tissue culture       device will require explantation  will put on schedule for tomorrow afternoon  NPO after midnight  No anticoagulation till after procedure  DVT prophylaxis with SCD's  Attending note to follow               Surgeon: Dr. Shelley/ Agatha / Joyce    Consult requesting by: Osiris   Primary  Charles R    HISTORY OF PRESENT ILLNESS:    57y Female with h/o COPD on 3L home O2, CORNELIUS on CPAP, CVA with residual LLE weakness, DM, HTN, HLD, CAD, s/p PCI, pulmonary HTN, HFrEF (15-20%) was seen in 4/22 with recommendation for ICD as EF did not improved despite medical therapy, Patient did not want to wait for the procedure and left, did not follow up as out-patient. On 5/25/22 patient had BiV ICD placed by Dr Cid in [facility].   Patient admitted today with swelling and drainage from the device site. Reports symptoms including purulent looking discharge started >2 months ago, she admits on scratching on. CTS consulted for explantation      Home Medications:  Unable to be verified  atorvastatin 80 mg oral tablet: 1 tab(s) orally once a day (at bedtime) (16 Feb 2022 13:50)  fenofibrate 145 mg oral tablet: 1 tab(s) orally once a day (16 Feb 2022 13:50)  magnesium oxide 400 mg oral tablet: 1 tab(s) orally 3 times a day (with meals) (14 Jan 2022 12:37)  metoprolol succinate 100 mg oral tablet, extended release: 1 tab(s) orally once a day (19 Nov 2022 11:09)  pantoprazole 40 mg oral delayed release tablet: 1 tab(s) orally once a day (before a meal) (16 Nov 2021 12:10)  Synthroid 25 mcg (0.025 mg) oral tablet: 1 tab(s) orally once a day (19 Nov 2022 11:09)        NYHA functional class    [ ] Class I (no limitation) [ ] Class II (slight limitation) [ ] Class III (marked limitation) [ ] Class IV (symptoms at rest)    PAST MEDICAL & SURGICAL HISTORY:  Hypertension      Hyperlipidemia      Anxiety and depression      COPD, severe      CHF (congestive heart failure)      Cerebrovascular accident (CVA)  Multiple      Type 2 diabetes mellitus      CKD (chronic kidney disease), stage II      ORIF right ankle    MEDICATIONS  (STANDING):  aspirin enteric coated 81 milliGRAM(s) Oral daily  atorvastatin 80 milliGRAM(s) Oral at bedtime  budesonide 160 MICROgram(s)/formoterol 4.5 MICROgram(s) Inhaler 2 Puff(s) Inhalation two times a day  cefepime   IVPB 2000 milliGRAM(s) IV Intermittent every 8 hours  divalproex  milliGRAM(s) Oral two times a day  enoxaparin Injectable 40 milliGRAM(s) SubCutaneous every 24 hours  furosemide   Injectable 40 milliGRAM(s) IV Push two times a day  insulin glargine Injectable (LANTUS) 5 Unit(s) SubCutaneous at bedtime  insulin lispro (ADMELOG) corrective regimen sliding scale   SubCutaneous three times a day before meals  insulin lispro Injectable (ADMELOG) 2 Unit(s) SubCutaneous three times a day before meals  metoprolol succinate  milliGRAM(s) Oral daily  pantoprazole    Tablet 40 milliGRAM(s) Oral before breakfast  polyethylene glycol 3350 17 Gram(s) Oral daily  sacubitril 49 mG/valsartan 51 mG 1 Tablet(s) Oral two times a day  spironolactone 50 milliGRAM(s) Oral daily  vancomycin  IVPB 1000 milliGRAM(s) IV Intermittent every 12 hours  zolpidem 5 milliGRAM(s) Oral at bedtime    MEDICATIONS  (PRN):  albuterol    90 MICROgram(s) HFA Inhaler 2 Puff(s) Inhalation every 6 hours PRN Bronchospasm  zolpidem 5 milliGRAM(s) Oral at bedtime PRN Insomnia    Antiplatelet therapy:    unknown                       Last dose/amt:    Allergies    No Known Allergies    Intolerances    SOCIAL HISTORY:  Denies recent alcohol, drug or tobacco abuse  Occupation: disabled  Assisted device use: motorized wheel chair    FAMILY HISTORY:  FH: diabetes mellitus (Father, Mother)    Review of Systems  CONSTITUTIONAL: no fever, chills or night sweats]   NEURO:  denies seizures, paralysis or + paresthesias                                                                                EYES: wears glasses, no double vision, no blurry vision                                                                          ENMT: no difficulty hearing, vertigo, dysphagia, epistaxis recent dental work [ ]                                      CV:  denies chest pain, + ORR no palpatations at current time                                                                                            RESPIRATORY:  no cough or hemoptysis, always SOB                                                                 GI:  no nausea, vomiting, constipation or diarrhea   : denies dysuria, hematuria, incontinence or retention                                                                                           MUSKULOSKELETAL:  denies joint swelling + muscle weakness  PSYCH:  denies dementia, depression, anxiety   ENDOCRINE:  cold intolerance[ ] heat intolerance[ ] polydipsia[ ]                                                                                                                                                                                                PHYSICAL EXAM  Vital Signs Last 24 Hrs  T(C): 36.8 (20 Nov 2022 09:24), Max: 36.8 (19 Nov 2022 23:42)  T(F): 98.3 (20 Nov 2022 09:24), Max: 98.3 (20 Nov 2022 09:24)  HR: 99 (20 Nov 2022 09:24) (93 - 100)  BP: 144/72 (20 Nov 2022 09:24) (124/71 - 173/91)  RR: 20 (20 Nov 2022 09:24) (19 - 20)  SpO2: 100% (20 Nov 2022 09:24) (96% - 100%)    Parameters below as of 20 Nov 2022 09:24  Patient On (Oxygen Delivery Method): nasal cannula  O2 Flow (L/min): 4    CONSTITUTIONAL:    well nourished, well developed, overweight in NAD                                                                       Neuro: oriented to person/place & time with limited ROM and strength bilateral lower extremities     Eyes: PERRLA, EOMI, no nystagmus, sclera anicteric  ENT:  nasal/oral mucosa with absence of cyanosis, poor dentition  Neck: no jugular vein distention, trachea midline, no goiter   Chest: bilateral breath shallow sounds with fair air movement  ? rales, left chest wound cellulitic, tender with fluid around device                                                                           CV:  RSR, S1S2, no significant murmur appreciated  GI:  soft, non-tender non-distended, protuberant + bowel sounds                                                                                                          Extremities:  no evidence of cyanosis or deformity, + pedal edema   Extremity Pulses: right / left:  DP's 2+/2+; radials 2+/2+     SKIN : no rashes                                                          LABS:                        11.9   5.72  )-----------( 171      ( 20 Nov 2022 06:35 )             38.8     11-20    140  |  102  |  13  ----------------------------<  83  4.0   |  27  |  1.1    Ca    9.3      20 Nov 2022 06:35  Phos  3.1     11-20  Mg     1.5     11-20    TPro  6.9  /  Alb  3.6  /  TBili  1.9<H>  /  DBili  x   /  AST  23  /  ALT  9   /  AlkPhos  113  11-20    PT/INR - ( 20 Nov 2022 06:35 )   PT: 13.30 sec;   INR: 1.16 ratio         PTT - ( 20 Nov 2022 06:35 )  PTT:38.7 sec    CARDIAC MARKERS ( 20 Nov 2022 06:35 )  x     / 0.03 ng/mL / x     / x     / x      CARDIAC MARKERS ( 19 Nov 2022 22:09 )  x     / 0.02 ng/mL / x     / x     / x      CARDIAC MARKERS ( 19 Nov 2022 11:29 )  x     / 0.03 ng/mL / x     / x     / x      CARDIAC MARKERS ( 19 Nov 2022 00:50 )  x     / 0.03 ng/mL / x     / x     / x        COVID-19: NotDetec (11-18-22 @ 23:46)      Assessment/ Plan:  56yo debilitated Female (motorized wheelchair) with h/o COPD on 3L home O2, CORNELIUS on CPAP, CVA with residual LLE weakness, DM, HTN, HLD, CAD, s/p PCI, pulmonary HTN, HFrEF, s/p BiV ICD [employer] 5/25/22, admitted with ICD site infection.  Will need removal of device and deep tissue culture       device will require explantation  will put on schedule for tomorrow afternoon  NPO after midnight  No anticoagulation till after procedure  DVT prophylaxis with SCD's  Attending note to follow      CTS Attending  ----------------  pt interviewed and examined  pt referred for explant of biv icd by   device implanted by Dr. Tato cid at [facility]  she refused removal after a persistent pocket sinus drainage was noted  she now required explant, possible laser extraction  procedure , risks, benefits and alternatives explained  she agreed to proceed and signed consent forms  40-min consult/preop/consnt  -FMR

## 2022-11-20 NOTE — PATIENT PROFILE ADULT - FALL HARM RISK - HARM RISK INTERVENTIONS

## 2022-11-20 NOTE — PROGRESS NOTE ADULT - ASSESSMENT
57 year old female with PMH of COPD on 4 L home O2, CORNELIUS on CPAP, HFrEF (19% on TTE 02/2022, life vest at home), pulmonary HTN, CAD s/p PCI to RCA, HTN, HLD, CVA with residual LLE weakness, DM, active smoker on home O2, presented to the ED for SOB and LE and UE edema    # Acute HFrEF exacerbation   - previous TTE 3/2022: 30-35% EF, moderate MR and TR  - start IV lasix 40 mg BID, strict I+Os  - c/w entresto and spironolactone  - on toprol 100 mg daily  - obtain 2D Echo    # Cellulitis, infection of left sided AICD pocket site  - noted pt was given fluconazole and rifampin as outpatient from Oct 19, 2022  - c/w IV cefepime and vancomycin  - follow up blood culture, may need YOKASTA for further evaluation, likely need explant of ICD    # Bilateral LE swelling likely 2/2 CHF  - rule out DVT, order for LE venous duplex    # Chronic hypoxemic respiratory failure 2/2 COPD on 4 L at baseline   # CORNELIUS on CPAP   - CPAP ordered based on prior setting, PEEP 8 and FiO2 40%  - c/w symbicort and albuterol prn  - c/w oxygen supplementation    # CAD s/p PCI+stent to RCA last cath in hospital 6/21/2021  - c/w aspirin and statin    # Elevated troponin likely from demand of CHF exacerbation   - trend cardiac enzymes    # Hx of CVA with residual LE weakness, currently wheelchair bound - c/w aspirin, statin     # DM II - hold home meds, start insulin regimen, titrate as needed    # Hypothyroidism - c/w levothyroxine, check TSH    # CKD stage 2, stable -  monitor BMP    # Suspected seizure disorder vs. mood disorder  - divalproex picked up on sure scripts   - will start 250 mg BID for now, check valproic acid level and med rec     # DVT prophylaxis - on lovenox subcut  # GI prophylaxis - on protonix  # Code status - Full Code 57 year old female with PMH of COPD on 4 L home O2, CORNELIUS on CPAP, HFrEF (19% on TTE 02/2022, life vest at home), pulmonary HTN, CAD s/p PCI to RCA, HTN, HLD, CVA with residual LLE weakness, DM, active smoker on home O2, presented to the ED for SOB and LE and UE edema    # Acute HFrEF exacerbation   - previous TTE 3/2022: 30-35% EF, moderate MR and TR  - start IV lasix 40 mg BID, strict I+Os  - c/w entresto and spironolactone  - on toprol 100 mg daily    # Cellulitis, infection of left sided AICD pocket site  # Bacteremia, blood culture growing gram positive cocci in pairs  - noted pt was given fluconazole and rifampin as outpatient from Oct 19, 2022  - c/w IV cefepime and vancomycin, follow up vanco trough  - follow up blood culture, repeat blood culture daily  - NPO after midnight for YOKASTA tomorrow and explant of ICD possibly including the leads    # Bilateral LE swelling likely 2/2 CHF  - rule out DVT, order for LE venous duplex    # Hypomagnesemia  - replete Mg today, monitor Mg level    # Chronic hypoxemic respiratory failure 2/2 COPD on 4 L at baseline   # CORNELIUS on CPAP   - CPAP ordered based on prior setting, PEEP 8 and FiO2 40%  - c/w symbicort and albuterol prn  - c/w oxygen supplementation    # CAD s/p PCI+stent to RCA last cath in hospital 6/21/2021  - c/w aspirin and statin    # Elevated troponin likely from demand of CHF exacerbation   - trend cardiac enzymes    # Hx of CVA with residual LE weakness, currently wheelchair bound - c/w aspirin, statin     # DM II - hold home meds, start insulin regimen, titrate as needed    # Hypothyroidism - c/w levothyroxine, check TSH    # CKD stage 2, stable -  monitor BMP    # Suspected seizure disorder vs. mood disorder  - divalproex picked up on [facility] scripts   - c/w divalproex 250 mg BID    # DVT prophylaxis - SCD (duplex negative for DVT), chemical prophylaxis on hold for procedure tomorrow  # GI prophylaxis - on protonix  # Code status - Full Code 57 year old female with PMH of COPD on 4 L home O2, CORNELIUS on CPAP, HFrEF (19% on TTE 02/2022, life vest at home), pulmonary HTN, CAD s/p PCI to RCA, HTN, HLD, CVA with residual LLE weakness, DM, active smoker on home O2, presented to the ED for SOB and LE and UE edema    # Acute HFrEF exacerbation   - previous TTE 3/2022: 30-35% EF, moderate MR and TR  - start IV lasix 40 mg BID, strict I+Os  - c/w entresto and spironolactone  - on toprol 100 mg daily    # Cellulitis, infection of left sided AICD pocket site  # Bacteremia, blood culture growing gram positive cocci in pairs  - noted pt was given fluconazole and rifampin as outpatient from Oct 19, 2022  - c/w IV cefepime and vancomycin, follow up vanco trough  - follow up blood culture, repeat blood culture daily  - NPO after midnight for OYKASTA tomorrow and explant of ICD possibly including the leads    # Bilateral LE swelling likely 2/2 CHF  - rule out DVT, order for LE venous duplex    # Hypomagnesemia  - replete Mg today, monitor Mg level    # Chronic hypoxemic respiratory failure 2/2 COPD on 4 L at baseline   # CORNELIUS on CPAP   - CPAP ordered based on prior setting, PEEP 8 and FiO2 40%  - c/w symbicort and albuterol prn  - c/w oxygen supplementation    # CAD s/p PCI+stent to RCA last cath in hospital 6/21/2021  - c/w aspirin and statin    # Elevated troponin likely from demand of CHF exacerbation   - trend cardiac enzymes    # Hx of CVA with residual LE weakness, currently wheelchair bound - c/w aspirin, statin     # DM II, low fsg in AM - hold home meds, will hold lantus especially pt will be NPO, keep bolus+sliding scale with meals for today    # Hypothyroidism - c/w levothyroxine, check TSH    # CKD stage 2, stable -  monitor BMP    # Suspected seizure disorder vs. mood disorder  - divalproex picked up on [facility] scripts   - c/w divalproex 250 mg BID    # DVT prophylaxis - SCD (duplex negative for DVT), chemical prophylaxis on hold for procedure tomorrow  # GI prophylaxis - on protonix  # Code status - Full Code

## 2022-11-20 NOTE — CHART NOTE - NSCHARTNOTEFT_GEN_A_CORE
Electrophysiology    Pts device __BiV ICD____ was interrogated on 11-20-22  Device working properly  Events: multiple NSTV, VT episode 8/27 treated with ATP  Pt is __NOT___ pacemaker dependent   AP0.1   % /  97.4  %  AT/AF burden  0.1  %  Underlying rhythm NSR  Mode DDD   Battery 4.3yrs  Patient will require wearable defibrillator after explantation     Contact EP ACP with any questions [id number]

## 2022-11-20 NOTE — CONSULT NOTE ADULT - ADDITIONAL PE
L AICD site : necrotic ulcer. Overlying edema/erythema. Has drainge from ulcer  LUE with significant edema/erythema. Olecranon tender

## 2022-11-20 NOTE — CONSULT NOTE ADULT - ASSESSMENT
57 year old female with PMH of COPD on 4 L home O2, CORNELIUS on CPAP, HFrEF (19% on TTE 02/2022, life vest at home), pulmonary HTN, CAD s/p PCI to RCA, HTN, HLD, CVA with residual LLE weakness, DM, active smoker on home O2 4L, presented to the ED for SOB and LE and UE edema. Notably, patient was here in 4/2022, but requested to be discharged prior to getting an AICD placed. in the interim, she had the AICD placed at [facility]. Patient began to experience SOB, and swelling in her arms and legs. SOB has worsened over the last 3 days. Pt states that she stopped her diuretic recently. Additionally, she noticed pain and "leaking" from the AICD site since it was placed 2 months ago associated with left UE swelling. Noted pt was also prescribed fluconazole and rifampin for 30 days supply from Oct 19, 2022 from [facility]. She denies fevers, chills, chest pain, or palpitations. 57 year old female with PMH of COPD on 4 L home O2, CORNELIUS on CPAP, HFrEF (19% on TTE 02/2022, life vest at home), pulmonary HTN, CAD s/p PCI to RCA, HTN, HLD, CVA with residual LLE weakness, DM, active smoker on home O2 4L, presented to the ED for SOB and LE and UE edema. Notably, patient was here in 4/2022, but requested to be discharged prior to getting an AICD placed. in the interim, she had the AICD placed at [facility]. Patient began to experience SOB, and swelling in her arms and legs. SOB has worsened over the last 3 days. Pt states that she stopped her diuretic recently. Additionally, she noticed pain and "leaking" from the AICD site since it was placed 2 months ago associated with left UE swelling. Noted pt was also prescribed fluconazole and rifampin for 30 days supply from Oct 19, 2022 from [facility]. She denies fevers, chills, chest pain, or palpitations.    IMPRESSION;   AICD with pocket space abscess and overlying cellulitis  LUE with significant edema/erythema with no DVT on doppler. R/o arterial occlusion   No evidence of fascitis    RECOMMENDATIONS;  BCx  Removal of AICD with deep tissue cultures  Vascular Sx evaluation of LUE  Vanco level and adjust dosis accordingly  Cefepime 2 gm iv q8h

## 2022-11-20 NOTE — CONSULT NOTE ADULT - SUBJECTIVE AND OBJECTIVE BOX
USMAN WILLIAN  57y, Female  Allergy: No Known Allergies      All historical available data reviewed.    HPI:  57 year old female with PMH of COPD on 4 L home O2, CORNELIUS on CPAP, HFrEF (19% on TTE 02/2022, life vest at home), pulmonary HTN, CAD s/p PCI to RCA, HTN, HLD, CVA with residual LLE weakness, DM, active smoker on home O2 4L, presented to the ED for SOB and LE and UE edema. Notably, patient was here in 4/2022, but requested to be discharged prior to getting an AICD placed. in the interim, she had the AICD placed at [facility]. Patient began to experience SOB, and swelling in her arms and legs. SOB has worsened over the last 3 days. Pt states that she stopped her diuretic recently. Additionally, she noticed pain and "leaking" from the AICD site since it was placed 2 months ago associated with left UE swelling. Noted pt was also prescribed fluconazole and rifampin for 30 days supply from Oct 19, 2022 from [facility]. She denies fevers, chills, chest pain, or palpitations. Pt states that she was in SNF for 4 months and was discharged home with [facility] 3 weeks ago. Pt is currently dependent on motorized wheelchair and only able to stand on short period using walker.    ED Course:   /98, HR 97.6, 96% on 4L, HR 98  CXR on my read shows bilateral congestion   trop 0.03, BNP 26,000    Patient admitted for CHrEF Exacerbation, and  suspected Cellulitis from AICD site.     Med Rec is not completed; [facility], and reports she does not know any of the meds she takes (even aspirin or water pill), she takes them from an unmarked blister pack; Pharmacy med rec may also be inaccurate, her meds were adjusted recently at a nursing home, per patient. Current meds added from sure [facility] and prior admissions          (19 Nov 2022 10:42)    FAMILY HISTORY:  FH: diabetes mellitus (Father, Mother)      PAST MEDICAL & SURGICAL HISTORY:  Hypertension      Hyperlipidemia      Anxiety and depression      COPD, severe      CHF (congestive heart failure)      Cerebrovascular accident (CVA)  Multiple      Type 2 diabetes mellitus      CKD (chronic kidney disease), stage II      No significant past surgical history            VITALS:  T(F): 98, Max: 98.2 (11-19-22 @ 23:42)  HR: 93  BP: 124/71  RR: 19Vital Signs Last 24 Hrs  T(C): 36.7 (20 Nov 2022 04:57), Max: 36.8 (19 Nov 2022 23:42)  T(F): 98 (20 Nov 2022 04:57), Max: 98.2 (19 Nov 2022 23:42)  HR: 93 (20 Nov 2022 04:57) (93 - 104)  BP: 124/71 (20 Nov 2022 04:57) (124/71 - 180/101)  BP(mean): --  RR: 19 (20 Nov 2022 04:57) (19 - 20)  SpO2: 99% (20 Nov 2022 04:57) (96% - 99%)    Parameters below as of 20 Nov 2022 04:57  Patient On (Oxygen Delivery Method): nasal cannula  O2 Flow (L/min): 4      TESTS & MEASUREMENTS:                        12.5   5.51  )-----------( 177      ( 19 Nov 2022 11:29 )             39.5     11-19    136  |  99  |  15  ----------------------------<  152<H>  4.2   |  25  |  1.0    Ca    8.9      19 Nov 2022 22:09  Phos  3.5     11-19  Mg     1.7     11-19    TPro  7.3  /  Alb  4.2  /  TBili  1.6<H>  /  DBili  x   /  AST  22  /  ALT  10  /  AlkPhos  117<H>  11-19    LIVER FUNCTIONS - ( 19 Nov 2022 11:29 )  Alb: 4.2 g/dL / Pro: 7.3 g/dL / ALK PHOS: 117 U/L / ALT: 10 U/L / AST: 22 U/L / GGT: x                   RADIOLOGY & ADDITIONAL TESTS:  Personal review of radiological diagnostics performed  Echo and EKG results noted when applicable.     MEDICATIONS:  albuterol    90 MICROgram(s) HFA Inhaler 2 Puff(s) Inhalation every 6 hours PRN  aspirin enteric coated 81 milliGRAM(s) Oral daily  atorvastatin 80 milliGRAM(s) Oral at bedtime  budesonide 160 MICROgram(s)/formoterol 4.5 MICROgram(s) Inhaler 2 Puff(s) Inhalation two times a day  cefepime   IVPB 2000 milliGRAM(s) IV Intermittent every 8 hours  divalproex  milliGRAM(s) Oral two times a day  enoxaparin Injectable 40 milliGRAM(s) SubCutaneous every 24 hours  furosemide   Injectable 40 milliGRAM(s) IV Push two times a day  insulin glargine Injectable (LANTUS) 5 Unit(s) SubCutaneous at bedtime  insulin lispro (ADMELOG) corrective regimen sliding scale   SubCutaneous three times a day before meals  insulin lispro Injectable (ADMELOG) 2 Unit(s) SubCutaneous three times a day before meals  metoprolol succinate  milliGRAM(s) Oral daily  pantoprazole    Tablet 40 milliGRAM(s) Oral before breakfast  polyethylene glycol 3350 17 Gram(s) Oral daily  sacubitril 49 mG/valsartan 51 mG 1 Tablet(s) Oral two times a day  spironolactone 50 milliGRAM(s) Oral daily  vancomycin  IVPB 1000 milliGRAM(s) IV Intermittent every 12 hours  zolpidem 5 milliGRAM(s) Oral at bedtime  zolpidem 5 milliGRAM(s) Oral at bedtime PRN      ANTIBIOTICS:  cefepime   IVPB 2000 milliGRAM(s) IV Intermittent every 8 hours  vancomycin  IVPB 1000 milliGRAM(s) IV Intermittent every 12 hours

## 2022-11-20 NOTE — CONSULT NOTE ADULT - TIME BILLING
CTS Attending  ----------------  pt interviewed and examined  pt referred for explant of biv icd by   device implanted by Dr. Tato cid at [facility]  she refused removal after a persistent pocket sinus drainage was noted  she now required explant, possible laser extraction  procedure , risks, benefits and alternatives explained  she agreed to proceed and signed consent forms  40-min consult/preop/consnt  -FMR
BiV-ICD pocket infection

## 2022-11-20 NOTE — PATIENT PROFILE ADULT - PACKS PER DAY
Principal Discharge DX:	Alcohol withdrawal  Secondary Diagnosis:	Thrombocytopenia  Secondary Diagnosis:	Epistaxis 0.25

## 2022-11-20 NOTE — PROGRESS NOTE ADULT - SUBJECTIVE AND OBJECTIVE BOX
WILLIAN MARY  57y Female    Patient is a 57y old  Female who presents with a chief complaint of 57y    INTERVAL HPI/OVERNIGHT EVENTS:    ROS: Pt is seen and examined at bedside.    Overnight events: No acute events overnight.    Vital Signs Last 24 Hrs  T(C): 36.7 (20 Nov 2022 04:57), Max: 36.8 (19 Nov 2022 23:42)  T(F): 98 (20 Nov 2022 04:57), Max: 98.2 (19 Nov 2022 23:42)  HR: 93 (20 Nov 2022 04:57) (93 - 104)  BP: 124/71 (20 Nov 2022 04:57) (124/71 - 180/101)  BP(mean): --  RR: 19 (20 Nov 2022 04:57) (19 - 20)  SpO2: 99% (20 Nov 2022 04:57) (96% - 99%)    Parameters below as of 20 Nov 2022 04:57  Patient On (Oxygen Delivery Method): nasal cannula  O2 Flow (L/min): 4      No Known Allergies      MEDICATIONS  (STANDING):  aspirin enteric coated 81 milliGRAM(s) Oral daily  atorvastatin 80 milliGRAM(s) Oral at bedtime  budesonide 160 MICROgram(s)/formoterol 4.5 MICROgram(s) Inhaler 2 Puff(s) Inhalation two times a day  cefepime   IVPB 2000 milliGRAM(s) IV Intermittent every 8 hours  divalproex  milliGRAM(s) Oral two times a day  enoxaparin Injectable 40 milliGRAM(s) SubCutaneous every 24 hours  furosemide   Injectable 40 milliGRAM(s) IV Push two times a day  insulin glargine Injectable (LANTUS) 5 Unit(s) SubCutaneous at bedtime  insulin lispro (ADMELOG) corrective regimen sliding scale   SubCutaneous three times a day before meals  insulin lispro Injectable (ADMELOG) 2 Unit(s) SubCutaneous three times a day before meals  metoprolol succinate  milliGRAM(s) Oral daily  pantoprazole    Tablet 40 milliGRAM(s) Oral before breakfast  polyethylene glycol 3350 17 Gram(s) Oral daily  sacubitril 49 mG/valsartan 51 mG 1 Tablet(s) Oral two times a day  spironolactone 50 milliGRAM(s) Oral daily  vancomycin  IVPB 1000 milliGRAM(s) IV Intermittent every 12 hours  zolpidem 5 milliGRAM(s) Oral at bedtime    MEDICATIONS  (PRN):  albuterol    90 MICROgram(s) HFA Inhaler 2 Puff(s) Inhalation every 6 hours PRN Bronchospasm  zolpidem 5 milliGRAM(s) Oral at bedtime PRN Insomnia    PHYSICAL EXAM:  General Appearance: NAD, obese, on nasal cannula, normal for age and gender. 	  Neck: Normal JVP, no bruit.   Eyes: Conjunctiva clear, Extra Ocular muscles intact. No scleral icterus.  ENMT: Moist oral mucosa. No oral lesion.  Cardiovascular: Regular rate and rhythm S1 S2, No JVD, systolic murmur.  Respiratory: Decreased breath sounds at bases. No wheezes, rales or rhonchi.  Psychiatry: Alert and oriented x 3, Mood & affect appropriate.  Gastrointestinal:  Soft, Non-tender, Non-distended. Abdominal and flank wall swelling bilaterally.  Neurologic: Bilateral LE weakness. Left arm weakness.  Musculoskeletal/ extremities: No clubbing, cyanosis. Bilateral LE swelling upto thigh and erythema in lower limb but no warmth. Left UE swelling.  Vascular: Peripheral pulses palpable bilaterally.  Skin/Integumen: No rashes, No ecchymoses, No cyanosis. ICD pocket noted with swelling. Surgical line with opening, erythema and drainage.     LABS:                        11.9   5.72  )-----------( 171      ( 20 Nov 2022 06:35 )             38.8     11-20    140  |  102  |  13  ----------------------------<  83  4.0   |  27  |  1.1    Ca    9.3      20 Nov 2022 06:35  Phos  3.1     11-20  Mg     1.5     11-20    TPro  6.9  /  Alb  3.6  /  TBili  1.9<H>  /  DBili  x   /  AST  23  /  ALT  9   /  AlkPhos  113  11-20    PT/INR - ( 20 Nov 2022 06:35 )   PT: 13.30 sec;   INR: 1.16 ratio         PTT - ( 20 Nov 2022 06:35 )  PTT:38.7 sec    Culture - Blood (collected 19 Nov 2022 04:59)  Source: .Blood Blood-Peripheral  Gram Stain (20 Nov 2022 06:53):    Growth in anaerobic bottle: Gram positive cocci in pairs  Preliminary Report (20 Nov 2022 06:53):    Growth in anaerobic bottle: Gram positive cocci in pairs    ***Blood Panel PCR results on this specimen are available    approximately 3 hours after the Gram stain result.***    Gram stain, PCR, and/or culture results may not always    correspond due to difference in methodologies.    ************************************************************    This PCR assay was performed by multiplex PCR. This    Assay tests for 66 bacterial and resistance gene targets.    Please refer to the [facility] test directory    at [email or web address] for details.  Organism: Blood Culture PCR (20 Nov 2022 08:42)  Organism: Blood Culture PCR (20 Nov 2022 08:42)        RADIOLOGY & ADDITIONAL TESTS:               WILLIAN MARY  57y Female    Patient is a 57y old  Female who presents with a chief complaint of SOB, LE swelling    INTERVAL HPI/OVERNIGHT EVENTS:    ROS: Pt is seen and examined at bedside. Pt denies fever, chills, chest pain, palpitations, nausea, vomiting, abdominal pain, dysuria, diarrhea, constipation. Pt states that her SOB has improved but continue to have LE swelling.    Overnight events: No acute events overnight.    Vital Signs Last 24 Hrs  T(C): 36.7 (20 Nov 2022 04:57), Max: 36.8 (19 Nov 2022 23:42)  T(F): 98 (20 Nov 2022 04:57), Max: 98.2 (19 Nov 2022 23:42)  HR: 93 (20 Nov 2022 04:57) (93 - 104)  BP: 124/71 (20 Nov 2022 04:57) (124/71 - 180/101)  BP(mean): --  RR: 19 (20 Nov 2022 04:57) (19 - 20)  SpO2: 99% (20 Nov 2022 04:57) (96% - 99%)    Parameters below as of 20 Nov 2022 04:57  Patient On (Oxygen Delivery Method): nasal cannula  O2 Flow (L/min): 4      No Known Allergies      MEDICATIONS  (STANDING):  aspirin enteric coated 81 milliGRAM(s) Oral daily  atorvastatin 80 milliGRAM(s) Oral at bedtime  budesonide 160 MICROgram(s)/formoterol 4.5 MICROgram(s) Inhaler 2 Puff(s) Inhalation two times a day  cefepime   IVPB 2000 milliGRAM(s) IV Intermittent every 8 hours  divalproex  milliGRAM(s) Oral two times a day  enoxaparin Injectable 40 milliGRAM(s) SubCutaneous every 24 hours  furosemide   Injectable 40 milliGRAM(s) IV Push two times a day  insulin glargine Injectable (LANTUS) 5 Unit(s) SubCutaneous at bedtime  insulin lispro (ADMELOG) corrective regimen sliding scale   SubCutaneous three times a day before meals  insulin lispro Injectable (ADMELOG) 2 Unit(s) SubCutaneous three times a day before meals  metoprolol succinate  milliGRAM(s) Oral daily  pantoprazole    Tablet 40 milliGRAM(s) Oral before breakfast  polyethylene glycol 3350 17 Gram(s) Oral daily  sacubitril 49 mG/valsartan 51 mG 1 Tablet(s) Oral two times a day  spironolactone 50 milliGRAM(s) Oral daily  vancomycin  IVPB 1000 milliGRAM(s) IV Intermittent every 12 hours  zolpidem 5 milliGRAM(s) Oral at bedtime    MEDICATIONS  (PRN):  albuterol    90 MICROgram(s) HFA Inhaler 2 Puff(s) Inhalation every 6 hours PRN Bronchospasm  zolpidem 5 milliGRAM(s) Oral at bedtime PRN Insomnia    PHYSICAL EXAM:  General Appearance: NAD, obese, on nasal cannula, normal for age and gender. 	  Neck: Normal JVP, no bruit.   Eyes: Conjunctiva clear, Extra Ocular muscles intact. No scleral icterus.  ENMT: Moist oral mucosa. No oral lesion.  Cardiovascular: Regular rate and rhythm S1 S2, No JVD, systolic murmur.  Respiratory: Decreased breath sounds at bases. No wheezes, rales or rhonchi.  Psychiatry: Alert and oriented x 3, Mood & affect appropriate.  Gastrointestinal:  Soft, Non-tender, Non-distended. Abdominal and flank wall swelling bilaterally.  Neurologic: Bilateral LE weakness. Left arm weakness.  Musculoskeletal/ extremities: No clubbing, cyanosis. Bilateral LE swelling upto thigh and erythema in lower limb but no warmth. Left UE swelling.  Vascular: Peripheral pulses palpable bilaterally.  Skin/Integumen: No rashes, No ecchymoses, No cyanosis. ICD pocket noted with swelling. Surgical line with opening, erythema and drainage.     LABS:                        11.9   5.72  )-----------( 171      ( 20 Nov 2022 06:35 )             38.8     11-20    140  |  102  |  13  ----------------------------<  83  4.0   |  27  |  1.1    Ca    9.3      20 Nov 2022 06:35  Phos  3.1     11-20  Mg     1.5     11-20    TPro  6.9  /  Alb  3.6  /  TBili  1.9<H>  /  DBili  x   /  AST  23  /  ALT  9   /  AlkPhos  113  11-20    PT/INR - ( 20 Nov 2022 06:35 )   PT: 13.30 sec;   INR: 1.16 ratio    PTT - ( 20 Nov 2022 06:35 )  PTT:38.7 sec    Culture - Blood (collected 19 Nov 2022 04:59)  Source: .Blood Blood-Peripheral  Gram Stain (20 Nov 2022 06:53):    Growth in anaerobic bottle: Gram positive cocci in pairs  Preliminary Report (20 Nov 2022 06:53):    Growth in anaerobic bottle: Gram positive cocci in pairs    ***Blood Panel PCR results on this specimen are available    approximately 3 hours after the Gram stain result.***    Gram stain, PCR, and/or culture results may not always    correspond due to difference in methodologies.    ************************************************************    This PCR assay was performed by multiplex PCR. This    Assay tests for 66 bacterial and resistance gene targets.    Please refer to the [facility] test directory    at [email or web address] for details.  Organism: Blood Culture PCR (20 Nov 2022 08:42)  Organism: Blood Culture PCR (20 Nov 2022 08:42)        RADIOLOGY & ADDITIONAL TESTS:

## 2022-11-21 LAB
A1C WITH ESTIMATED AVERAGE GLUCOSE RESULT: 5.6 % — SIGNIFICANT CHANGE UP (ref 4–5.6)
ALBUMIN SERPL ELPH-MCNC: 3.4 G/DL — LOW (ref 3.5–5.2)
ALP SERPL-CCNC: 97 U/L — SIGNIFICANT CHANGE UP (ref 30–115)
ALT FLD-CCNC: 9 U/L — SIGNIFICANT CHANGE UP (ref 0–41)
ANION GAP SERPL CALC-SCNC: 9 MMOL/L — SIGNIFICANT CHANGE UP (ref 7–14)
AST SERPL-CCNC: 19 U/L — SIGNIFICANT CHANGE UP (ref 0–41)
BASOPHILS # BLD AUTO: 0.08 K/UL — SIGNIFICANT CHANGE UP (ref 0–0.2)
BASOPHILS NFR BLD AUTO: 1.7 % — HIGH (ref 0–1)
BILIRUB SERPL-MCNC: 1.7 MG/DL — HIGH (ref 0.2–1.2)
BUN SERPL-MCNC: 13 MG/DL — SIGNIFICANT CHANGE UP (ref 10–20)
CALCIUM SERPL-MCNC: 9.1 MG/DL — SIGNIFICANT CHANGE UP (ref 8.4–10.4)
CHLORIDE SERPL-SCNC: 99 MMOL/L — SIGNIFICANT CHANGE UP (ref 98–110)
CO2 SERPL-SCNC: 30 MMOL/L — SIGNIFICANT CHANGE UP (ref 17–32)
CREAT SERPL-MCNC: 1.2 MG/DL — SIGNIFICANT CHANGE UP (ref 0.7–1.5)
CULTURE RESULTS: SIGNIFICANT CHANGE UP
EGFR: 53 ML/MIN/1.73M2 — LOW
EOSINOPHIL # BLD AUTO: 0.19 K/UL — SIGNIFICANT CHANGE UP (ref 0–0.7)
EOSINOPHIL NFR BLD AUTO: 4 % — SIGNIFICANT CHANGE UP (ref 0–8)
ESTIMATED AVERAGE GLUCOSE: 114 MG/DL — SIGNIFICANT CHANGE UP (ref 68–114)
GLUCOSE BLDC GLUCOMTR-MCNC: 126 MG/DL — HIGH (ref 70–99)
GLUCOSE BLDC GLUCOMTR-MCNC: 143 MG/DL — HIGH (ref 70–99)
GLUCOSE BLDC GLUCOMTR-MCNC: 158 MG/DL — HIGH (ref 70–99)
GLUCOSE BLDC GLUCOMTR-MCNC: 86 MG/DL — SIGNIFICANT CHANGE UP (ref 70–99)
GLUCOSE SERPL-MCNC: 93 MG/DL — SIGNIFICANT CHANGE UP (ref 70–99)
HCT VFR BLD CALC: 39.4 % — SIGNIFICANT CHANGE UP (ref 37–47)
HGB BLD-MCNC: 12.4 G/DL — SIGNIFICANT CHANGE UP (ref 12–16)
IMM GRANULOCYTES NFR BLD AUTO: 0.4 % — HIGH (ref 0.1–0.3)
LYMPHOCYTES # BLD AUTO: 0.78 K/UL — LOW (ref 1.2–3.4)
LYMPHOCYTES # BLD AUTO: 16.3 % — LOW (ref 20.5–51.1)
MAGNESIUM SERPL-MCNC: 1.7 MG/DL — LOW (ref 1.8–2.4)
MCHC RBC-ENTMCNC: 29.1 PG — SIGNIFICANT CHANGE UP (ref 27–31)
MCHC RBC-ENTMCNC: 31.5 G/DL — LOW (ref 32–37)
MCV RBC AUTO: 92.5 FL — SIGNIFICANT CHANGE UP (ref 81–99)
MONOCYTES # BLD AUTO: 0.61 K/UL — HIGH (ref 0.1–0.6)
MONOCYTES NFR BLD AUTO: 12.7 % — HIGH (ref 1.7–9.3)
NEUTROPHILS # BLD AUTO: 3.11 K/UL — SIGNIFICANT CHANGE UP (ref 1.4–6.5)
NEUTROPHILS NFR BLD AUTO: 64.9 % — SIGNIFICANT CHANGE UP (ref 42.2–75.2)
NRBC # BLD: 0 /100 WBCS — SIGNIFICANT CHANGE UP (ref 0–0)
ORGANISM # SPEC MICROSCOPIC CNT: SIGNIFICANT CHANGE UP
ORGANISM # SPEC MICROSCOPIC CNT: SIGNIFICANT CHANGE UP
PHOSPHATE SERPL-MCNC: 3 MG/DL — SIGNIFICANT CHANGE UP (ref 2.1–4.9)
PLATELET # BLD AUTO: 190 K/UL — SIGNIFICANT CHANGE UP (ref 130–400)
POTASSIUM SERPL-MCNC: 4.3 MMOL/L — SIGNIFICANT CHANGE UP (ref 3.5–5)
POTASSIUM SERPL-SCNC: 4.3 MMOL/L — SIGNIFICANT CHANGE UP (ref 3.5–5)
PROT SERPL-MCNC: 6.5 G/DL — SIGNIFICANT CHANGE UP (ref 6–8)
RBC # BLD: 4.26 M/UL — SIGNIFICANT CHANGE UP (ref 4.2–5.4)
RBC # FLD: 19.2 % — HIGH (ref 11.5–14.5)
SODIUM SERPL-SCNC: 138 MMOL/L — SIGNIFICANT CHANGE UP (ref 135–146)
SPECIMEN SOURCE: SIGNIFICANT CHANGE UP
T4 FREE SERPL-MCNC: 1.4 NG/DL — SIGNIFICANT CHANGE UP (ref 0.9–1.8)
T4 FREE+ TSH PNL SERPL: 4.74 UIU/ML — HIGH (ref 0.27–4.2)
VANCOMYCIN TROUGH SERPL-MCNC: 31.6 UG/ML — HIGH (ref 5–10)
WBC # BLD: 4.79 K/UL — LOW (ref 4.8–10.8)
WBC # FLD AUTO: 4.79 K/UL — LOW (ref 4.8–10.8)

## 2022-11-21 PROCEDURE — 93931 UPPER EXTREMITY STUDY: CPT | Mod: 26,LT

## 2022-11-21 PROCEDURE — 71045 X-RAY EXAM CHEST 1 VIEW: CPT | Mod: 26

## 2022-11-21 PROCEDURE — 99233 SBSQ HOSP IP/OBS HIGH 50: CPT

## 2022-11-21 RX ORDER — BUMETANIDE 0.25 MG/ML
2 INJECTION INTRAMUSCULAR; INTRAVENOUS ONCE
Refills: 0 | Status: COMPLETED | OUTPATIENT
Start: 2022-11-21 | End: 2022-11-21

## 2022-11-21 RX ORDER — MAGNESIUM SULFATE 500 MG/ML
2 VIAL (ML) INJECTION ONCE
Refills: 0 | Status: COMPLETED | OUTPATIENT
Start: 2022-11-21 | End: 2022-11-21

## 2022-11-21 RX ORDER — FUROSEMIDE 40 MG
40 TABLET ORAL ONCE
Refills: 0 | Status: DISCONTINUED | OUTPATIENT
Start: 2022-11-21 | End: 2022-11-21

## 2022-11-21 RX ADMIN — DIVALPROEX SODIUM 250 MILLIGRAM(S): 500 TABLET, DELAYED RELEASE ORAL at 05:52

## 2022-11-21 RX ADMIN — SACUBITRIL AND VALSARTAN 1 TABLET(S): 24; 26 TABLET, FILM COATED ORAL at 18:17

## 2022-11-21 RX ADMIN — SACUBITRIL AND VALSARTAN 1 TABLET(S): 24; 26 TABLET, FILM COATED ORAL at 05:51

## 2022-11-21 RX ADMIN — BUMETANIDE 2 MILLIGRAM(S): 0.25 INJECTION INTRAMUSCULAR; INTRAVENOUS at 12:00

## 2022-11-21 RX ADMIN — ATORVASTATIN CALCIUM 80 MILLIGRAM(S): 80 TABLET, FILM COATED ORAL at 21:08

## 2022-11-21 RX ADMIN — Medication 25 GRAM(S): at 11:04

## 2022-11-21 RX ADMIN — CEFEPIME 100 MILLIGRAM(S): 1 INJECTION, POWDER, FOR SOLUTION INTRAMUSCULAR; INTRAVENOUS at 17:52

## 2022-11-21 RX ADMIN — Medication 40 MILLIGRAM(S): at 05:51

## 2022-11-21 RX ADMIN — Medication 250 MILLIGRAM(S): at 06:56

## 2022-11-21 RX ADMIN — SPIRONOLACTONE 50 MILLIGRAM(S): 25 TABLET, FILM COATED ORAL at 05:52

## 2022-11-21 RX ADMIN — CEFEPIME 100 MILLIGRAM(S): 1 INJECTION, POWDER, FOR SOLUTION INTRAMUSCULAR; INTRAVENOUS at 21:07

## 2022-11-21 RX ADMIN — ZOLPIDEM TARTRATE 5 MILLIGRAM(S): 10 TABLET ORAL at 21:08

## 2022-11-21 RX ADMIN — CEFEPIME 100 MILLIGRAM(S): 1 INJECTION, POWDER, FOR SOLUTION INTRAMUSCULAR; INTRAVENOUS at 05:51

## 2022-11-21 RX ADMIN — Medication 100 MILLIGRAM(S): at 05:52

## 2022-11-21 RX ADMIN — PANTOPRAZOLE SODIUM 40 MILLIGRAM(S): 20 TABLET, DELAYED RELEASE ORAL at 05:51

## 2022-11-21 RX ADMIN — Medication 40 MILLIGRAM(S): at 17:52

## 2022-11-21 RX ADMIN — ZOLPIDEM TARTRATE 5 MILLIGRAM(S): 10 TABLET ORAL at 21:36

## 2022-11-21 RX ADMIN — Medication 250 MILLIGRAM(S): at 18:17

## 2022-11-21 RX ADMIN — DIVALPROEX SODIUM 250 MILLIGRAM(S): 500 TABLET, DELAYED RELEASE ORAL at 18:16

## 2022-11-21 NOTE — PRE-ANESTHESIA EVALUATION ADULT - NSANTHADDINFOFT_GEN_ALL_CORE
Pt with, severe COPD on home O2, CORNELIUS on CPAP and CHF is schedule for YOKASTA under sedation.  PE: /77, HR 90/min, SaO2 92-96on O2 4l/min-NC, RA SaO2 88-89%. pt is very sleepy, responding to questions and goes back to sleep immediately after the questioning.  Discuss all R/B/A including the possibility of intubation D/W pt, also D/W Dr. REN Viera the possibility of intubation during the erin-op period. As the procedure not urgent and could wait, she agreed and decided to delay the YOKASTA until pt is optimized.

## 2022-11-21 NOTE — PROGRESS NOTE ADULT - ASSESSMENT
57 year old female with PMH of COPD on 4 L home O2, CORNELIUS on CPAP, HFrEF (19% on TTE 02/2022, life vest at home), pulmonary HTN, CAD s/p PCI to RCA, HTN, HLD, CVA with residual LLE weakness, DM, active smoker on home O2, presented to the ED for SOB and LE and UE edema    # Acute HFrEF exacerbation   - previous TTE March 2022: 30-35% EF, moderate MR and TR  - On Lasix 40mg IV BID  - on Entresto and spironolactone  - on Toprol 100 mg daily    # Cellulitis, infection of left sided AICD pocket site  # Bacteremia, blood culture growing gram positive cocci in pairs  - On IV cefepime and vancomycin, follow up vanco trough  - follow up blood culture, repeat blood culture daily  - Planned for YOKASTA and explant of ICD today    # Bilateral LE swelling  - likely 2/2 CHF  - Negative LE duplex    # Hypomagnesemia  - Replete as needed    # Chronic hypoxemic respiratory failure 2/2 COPD on 4 L at baseline   # CORNELIUS on CPAP   - CPAP ordered based on prior setting, PEEP 8 and FiO2 40%  - c/w Symbicort and Albuterol prn  - c/w oxygen supplementation    # CAD   - s/p PCI+stent to RCA last cath in hospital June 2021  - c/w aspirin and statin    # Elevated troponin  - likely from demand of CHF exacerbation     # Hx of CVA with residual LE weakness  - currently wheelchair bound   - c/w aspirin, statin     # DM II  - Low fasting sugar, Lantus held  - On sliding scale and standing pre-meals    # Hypothyroidism   - c/w levothyroxine, check TSH    # CKD stage 2, stable   -  monitor BMP    # Suspected seizure disorder vs. mood disorder  - divalproex picked up on sure scripts   - c/w divalproex 250 mg BID    # DVT ppx: SCD because she scheduled for Explant of ICD  # GI ppx: Pantoprazole  # Code status - Full Code  Handoff: Pending YOKASTA, and Explant of ICD 57 year old female with PMH of COPD on 4 L home O2, CORNELIUS on CPAP, HFrEF (19% on TTE 02/2022, life vest at home), pulmonary HTN, CAD s/p PCI to RCA, HTN, HLD, CVA with residual LLE weakness, DM, active smoker on home O2, presented to the ED for SOB and LE and UE edema    # Acute HFrEF exacerbation   - previous TTE March 2022: 30-35% EF, moderate MR and TR  - On Lasix 40mg IV BID  - on Entresto and spironolactone  - on Toprol 100 mg daily    # Cellulitis, infection of left sided AICD pocket site  # Bacteremia, blood culture growing gram positive cocci in pairs  - On IV cefepime and vancomycin, follow up vanco trough  - follow up blood culture, repeat blood culture daily  - Planned for YOKASTA and explant of ICD today  - YOKASTA cancelled due to increased work of breathing    # Bilateral LE swelling  - likely 2/2 CHF  - Negative LE duplex    # Hypomagnesemia  - Replete as needed    # Chronic hypoxemic respiratory failure 2/2 COPD on 4 L at baseline   # CORNELIUS on CPAP   - CPAP ordered based on prior setting, PEEP 8 and FiO2 40%  - c/w Symbicort and Albuterol prn  - c/w oxygen supplementation    # CAD   - s/p PCI+stent to RCA last cath in hospital June 2021  - c/w aspirin and statin    # Elevated troponin  - likely from demand of CHF exacerbation     # Hx of CVA with residual LE weakness  - currently wheelchair bound   - c/w aspirin, statin     # DM II  - Low fasting sugar, Lantus held  - On sliding scale and standing pre-meals    # Hypothyroidism   - c/w levothyroxine, check TSH    # CKD stage 2, stable   -  monitor BMP    # Suspected seizure disorder vs. mood disorder  - divalproex picked up on sure scripts   - c/w divalproex 250 mg BID    # DVT ppx: SCD because she scheduled for Explant of ICD  # GI ppx: Pantoprazole  # Code status - Full Code  Handoff: Pending YOKASTA, and Explant of ICD

## 2022-11-21 NOTE — PROGRESS NOTE ADULT - ASSESSMENT
56 yo F PMHx COPD on 4 L home O2, CORNELIUS on CPAP, chronic HFrEF (19% on TTE 02/2022, life vest at home), pulmonary HTN, CAD s/p PCI to RCA, HTN, HLD, CVA with residual LLE weakness, DM II, active smoker on home O2, presented to the ED for SOB and LE and UE edema. Pt found to have CHF exacerbation as well as cellulitis.     Acute on chronic HFrEF exacerbation   - previous TTE 3/2022: 30-35% EF, moderate MR and TR  - pt is known to me from prior admission, typically does well on Bumex. Change lasix to IV Bumex   - c/w entresto, spironolactone, metoprolol  - bipap now for tachypnea that developed prior to YOKASTA    Cellulitis, infection of left sided AICD pocket site  Bacteremia,   LUE cellulitis  - noted pt was given fluconazole and rifampin as outpatient from Oct 19, 2022  - c/w IV cefepime and vancomycin, follow up vanco trough  - follow up blood culture, repeat blood culture daily  - cultures growing coagulase negative staph and strept  - repeat cultures  - YOKASTA was scheduled for today to r/o endocarditis but was cancelled due to tachypnea  - scheduled for explant of AICD  - keep LUE elevated, pulses intact, distal extremity cool but not painful. Arterial duplex ordered as urgent, doubt arterial occlusion, no evidence of compartment syndrome    Hypomagnesemia  - replete Mg today, monitor Mg level    Chronic hypoxemic respiratory failure 2/2 COPD on 4 L at baseline   CORNELIUS on CPAP   - CPAP ordered based on prior setting, PEEP 8 and FiO2 40%  - c/w symbicort and albuterol prn  - c/w oxygen supplementation    CAD s/p PCI  stent to RCA last cath in hospital 6/21/2021  - c/w aspirin and statin    Elevated troponin likely from demand of CHF exacerbation   - trend cardiac enzymes    CVA   residual LE weakness  currently wheelchair bound   - c/w aspirin, statin     DM II  - FS goal 110-180  - c/w insulin    Hypothyroidism   - c/w levothyroxine, check TSH    CKD III  - stable     Suspected seizure disorder vs. mood disorder  - divalproex picked up on sure scripts   - c/w divalproex 250 mg BID    # DVT prophylaxis - SCD (duplex negative for DVT),   # GI prophylaxis - on protonix  # Code status - Full Code    #Progress Note Handoff:  Pending (specify): Duplex, YOKASTA, pending explant  Family discussion: Discussed june, duplex, pending explant  Disposition: Home__x_/SNF___/Other________/Unknown at this time________

## 2022-11-21 NOTE — CHART NOTE - NSCHARTNOTEFT_GEN_A_CORE
Pt was evaluated prior to YOKASTA on supplemental oxygen with tachypnea and increase work of breathing. Pt has underlying COPD/CORNELIUS/OHS on home oxygen. Per anesthesia risk of YOKASTA outweighs the benefits at this time and patient has very high chance of getting intubated during YOKASTA. After discussion of risk vs benefits YOKASTA was cancelled. YOKASTA cancelled.  Patient was evaluated prior to YOKASTA on supplemental oxygen with tachypnea and increase work of breathing.  Pt has underlying COPD/CORNELIUS/OHS on home oxygen.  Risk of YOKASTA outweighs any benefit at this time with a high likelihood of intubation.

## 2022-11-21 NOTE — PROGRESS NOTE ADULT - SUBJECTIVE AND OBJECTIVE BOX
24H events:    Patient is a 57y old Female who presents with a chief complaint of CHrEF Exacerbation, and  suspected Cellulitis from [facility] site. (20 Nov 2022 13:30)    Primary diagnosis of CHF exacerbation       Today is hospital day 2d. This morning patient was seen and examined at bedside, resting comfortably in bed.      PAST MEDICAL & SURGICAL HISTORY  Hypertension    Hyperlipidemia    Anxiety and depression    COPD, severe    CHF (congestive heart failure)    Cerebrovascular accident (CVA)  Multiple    Type 2 diabetes mellitus    CKD (chronic kidney disease), stage II    No significant past surgical history      SOCIAL HISTORY:  Social History:      ALLERGIES:  No Known Allergies    MEDICATIONS:  STANDING MEDICATIONS  aspirin enteric coated 81 milliGRAM(s) Oral daily  atorvastatin 80 milliGRAM(s) Oral at bedtime  budesonide 160 MICROgram(s)/formoterol 4.5 MICROgram(s) Inhaler 2 Puff(s) Inhalation two times a day  cefepime   IVPB 2000 milliGRAM(s) IV Intermittent every 8 hours  divalproex  milliGRAM(s) Oral two times a day  furosemide   Injectable 40 milliGRAM(s) IV Push two times a day  influenza   Vaccine 0.5 milliLiter(s) IntraMuscular once  insulin lispro (ADMELOG) corrective regimen sliding scale   SubCutaneous three times a day before meals  insulin lispro Injectable (ADMELOG) 2 Unit(s) SubCutaneous three times a day before meals  metoprolol succinate  milliGRAM(s) Oral daily  pantoprazole    Tablet 40 milliGRAM(s) Oral before breakfast  polyethylene glycol 3350 17 Gram(s) Oral daily  sacubitril 49 mG/valsartan 51 mG 1 Tablet(s) Oral two times a day  spironolactone 50 milliGRAM(s) Oral daily  vancomycin  IVPB 1000 milliGRAM(s) IV Intermittent every 12 hours  zolpidem 5 milliGRAM(s) Oral at bedtime    PRN MEDICATIONS  albuterol    90 MICROgram(s) HFA Inhaler 2 Puff(s) Inhalation every 6 hours PRN  zolpidem 5 milliGRAM(s) Oral at bedtime PRN        LABS:                        12.4   4.79  )-----------( 190      ( 21 Nov 2022 06:02 )             39.4     11-21    138  |  99  |  13  ----------------------------<  93  4.3   |  30  |  1.2    Ca    9.1      21 Nov 2022 06:02  Phos  3.0     11-21  Mg     1.7     11-21    TPro  6.5  /  Alb  3.4<L>  /  TBili  1.7<H>  /  DBili  x   /  AST  19  /  ALT  9   /  AlkPhos  97  11-21    PT/INR - ( 20 Nov 2022 06:35 )   PT: 13.30 sec;   INR: 1.16 ratio         PTT - ( 20 Nov 2022 06:35 )  PTT:38.7 sec          Culture - Blood (collected 19 Nov 2022 22:11)  Source: .Blood Blood  Preliminary Report (21 Nov 2022 07:02):    No growth to date.    Culture - Blood (collected 19 Nov 2022 04:59)  Source: .Blood Blood-Peripheral  Gram Stain (20 Nov 2022 06:53):    Growth in anaerobic bottle: Gram positive cocci in pairs  Preliminary Report (20 Nov 2022 06:53):    Growth in anaerobic bottle: Gram positive cocci in pairs    ***Blood Panel PCR results on this specimen are available    approximately 3 hours after the Gram stain result.***    Gram stain, PCR, and/or culture results may not always    correspond due to difference in methodologies.    ************************************************************    This PCR assay was performed by multiplex PCR. This    Assay tests for 66 bacterial and resistance gene targets.    Please refer to the [facility] test directory    at [email or web address] for details.  Organism: Blood Culture PCR (20 Nov 2022 08:42)  Organism: Blood Culture PCR (20 Nov 2022 08:42)    Culture - Blood (collected 19 Nov 2022 04:59)  Source: .Blood Blood-Peripheral  Preliminary Report (20 Nov 2022 13:01):    No growth to date.      CARDIAC MARKERS ( 20 Nov 2022 06:35 )  x     / 0.03 ng/mL / x     / x     / x      CARDIAC MARKERS ( 19 Nov 2022 22:09 )  x     / 0.02 ng/mL / x     / x     / x      CARDIAC MARKERS ( 19 Nov 2022 11:29 )  x     / 0.03 ng/mL / x     / x     / x          RADIOLOGY:    VITALS:   T(F): 97  HR: 87  BP: 131/68  RR: 18  SpO2: 98%    PHYSICAL EXAM:    GENERAL: NAD, well-groomed, well-developed  HEAD:  Atraumatic, Normocephalic  EYES: EOMI  NECK: Supple  NERVOUS SYSTEM:  Alert & Oriented X3, motor 5/5 b/l upper and lower ext, sensation intact  CHEST/LUNG: Poor inspiratory effort  HEART: Regular rate and rhythm; No murmurs, rubs, or gallops  ABDOMEN: Soft, Nontender, Nondistended; Bowel sounds present  EXTREMITIES:  No clubbing, cyanosis, or edema  LYMPH: No lymphadenopathy noted  SKIN: No rashes or lesions

## 2022-11-21 NOTE — PROGRESS NOTE ADULT - SUBJECTIVE AND OBJECTIVE BOX
CHIEF COMPLAINT:    Patient is a 57y old  Female who presents with a chief complaint of CHrEF Exacerbation, and  suspected Cellulitis from AICD site.     INTERVAL HPI/OVERNIGHT EVENTS:    Patient seen and examined at bedside. No acute overnight events occurred.    ROS: Demoes SOB. reports LUE edema All other systems are negative.    Medications:  Standing  aspirin enteric coated 81 milliGRAM(s) Oral daily  atorvastatin 80 milliGRAM(s) Oral at bedtime  budesonide 160 MICROgram(s)/formoterol 4.5 MICROgram(s) Inhaler 2 Puff(s) Inhalation two times a day  cefepime   IVPB 2000 milliGRAM(s) IV Intermittent every 8 hours  divalproex  milliGRAM(s) Oral two times a day  furosemide   Injectable 40 milliGRAM(s) IV Push two times a day  influenza   Vaccine 0.5 milliLiter(s) IntraMuscular once  insulin lispro (ADMELOG) corrective regimen sliding scale   SubCutaneous three times a day before meals  insulin lispro Injectable (ADMELOG) 2 Unit(s) SubCutaneous three times a day before meals  metoprolol succinate  milliGRAM(s) Oral daily  pantoprazole    Tablet 40 milliGRAM(s) Oral before breakfast  polyethylene glycol 3350 17 Gram(s) Oral daily  sacubitril 49 mG/valsartan 51 mG 1 Tablet(s) Oral two times a day  spironolactone 50 milliGRAM(s) Oral daily  vancomycin  IVPB 1000 milliGRAM(s) IV Intermittent every 12 hours  zolpidem 5 milliGRAM(s) Oral at bedtime    PRN Meds  albuterol    90 MICROgram(s) HFA Inhaler 2 Puff(s) Inhalation every 6 hours PRN  zolpidem 5 milliGRAM(s) Oral at bedtime PRN        Vital Signs:    T(F): 97 (22 @ 04:35), Max: 97.7 (22 @ 16:08)  HR: 87 (22 @ 09:38) (87 - 94)  BP: 131/68 (22 @ 07:51) (114/69 - 131/68)  RR: 18 (22 @ 07:51) (18 - 19)  SpO2: 98% (22 @ 09:38) (95% - 98%)  I&O's Summary    2022 07:01  -  2022 07:00  --------------------------------------------------------  IN: 730 mL / OUT: 1900 mL / NET: -1170 mL      Daily     Daily Weight in k.2 (2022 04:35)  CAPILLARY BLOOD GLUCOSE      POCT Blood Glucose.: 86 mg/dL (2022 11:29)  POCT Blood Glucose.: 158 mg/dL (2022 07:50)  POCT Blood Glucose.: 96 mg/dL (2022 22:02)  POCT Blood Glucose.: 162 mg/dL (2022 18:18)  POCT Blood Glucose.: 101 mg/dL (2022 17:10)      PHYSICAL EXAM:  GENERAL:  NAD  SKIN: LUE scabs and erythemia  HEENT: Atraumatic. Normocephalic. Anicteric  NECK:  No JVD.   PULMONARY: Clear to ausculation bilaterally. No wheezing. No rales  CVS: Normal S1, S2. Regular rate and rhythm. No murmurs.  ABDOMEN/GI: Soft, Nontender, Nondistended; Bowel sounds are present  EXTREMITIES:  LUE 3+ pitting edema,   NEUROLOGIC:  No motor deficit.  PSYCH: Alert & oriented x 3, normal affect      LABS:                        12.4   4.79  )-----------( 190      ( 2022 06:02 )             39.4     -    138  |  99  |  13  ----------------------------<  93  4.3   |  30  |  1.2    Ca    9.1      2022 06:02  Phos  3.0       Mg     1.7         TPro  6.5  /  Alb  3.4<L>  /  TBili  1.7<H>  /  DBili  x   /  AST  19  /  ALT  9   /  AlkPhos  97  11-    PT/INR - ( 2022 06:35 )   PT: 13.30 sec;   INR: 1.16 ratio         PTT - ( 2022 06:35 )  PTT:38.7 sec  Serum Pro-Brain Natriuretic Peptide: 13586 pg/mL (22 @ 00:50)    Trop 0.03, CKMB --, CK --, 22 @ 06:35  Trop 0.02, CKMB --, CK --, 22 @ 22:09  Trop 0.03, CKMB --, CK --, 22 @ 11:29  Trop 0.03, CKMB --, CK --, 22 @ 00:50      Culture - Blood (collected 2022 22:11)  Source: .Blood Blood  Preliminary Report (2022 07:02):    No growth to date.    Culture - Blood (collected 2022 04:59)  Source: .Blood Blood-Peripheral  Gram Stain (2022 06:53):    Growth in anaerobic bottle: Gram positive cocci in pairs  Final Report (2022 12:08):    Growth in anaerobic bottle: Staphylococcus haemolyticus Coag Negative    Staphylococcus    Single set isolate, possible contaminant. Contact    Microbiology if susceptibility testing clinically    indicated.    Growth in anaerobic bottle: Streptococcus salivarius/vestibularis group    Growth in anaerobic bottle: Streptococcus mitis/oralis group    Alpha hemolytic strep    (not Strep. pneumoniae or Enterococcus)    Single set isolate, possible contaminant. Contact    Microbiology if susceptibility testing clinically    indicated.    ***Blood Panel PCR results on this specimen are available    approximately 3 hours after the Gram stain result.***    Gram stain, PCR, and/or culture results may not always    correspond due to difference in methodologies.    ************************************************************    This PCR assay was performed by multiplex PCR. This    Assay tests for 66 bacterial and resistance gene targets.    Please refer to the [facility] test directory    at [email or web address] for details.  Organism: Blood Culture PCR (2022 12:08)  Organism: Blood Culture PCR (2022 12:08)    Culture - Blood (collected 2022 04:59)  Source: .Blood Blood-Peripheral  Preliminary Report (2022 13:01):    No growth to date.        RADIOLOGY & ADDITIONAL TESTS:  Imaging or report Personally Reviewed:  [ ] YES  [ ] NO -->no new images    Telemetry reviewed independently - NSR, no acute events  EKG reviewed independently -->no new EKGs    Consultant(s) Notes Reviewed:  [ ] YES  [ ] NO  Care Discussed with Consultants/Other Providers [ ] YES  [ ] NO    Case discussed with resident  Care discussed with pt

## 2022-11-22 LAB
ALBUMIN SERPL ELPH-MCNC: 3.8 G/DL — SIGNIFICANT CHANGE UP (ref 3.5–5.2)
ALBUMIN SERPL ELPH-MCNC: 3.9 G/DL — SIGNIFICANT CHANGE UP (ref 3.5–5.2)
ALP SERPL-CCNC: 110 U/L — SIGNIFICANT CHANGE UP (ref 30–115)
ALP SERPL-CCNC: 98 U/L — SIGNIFICANT CHANGE UP (ref 30–115)
ALT FLD-CCNC: 11 U/L — SIGNIFICANT CHANGE UP (ref 0–41)
ALT FLD-CCNC: 9 U/L — SIGNIFICANT CHANGE UP (ref 0–41)
ANION GAP SERPL CALC-SCNC: 11 MMOL/L — SIGNIFICANT CHANGE UP (ref 7–14)
ANION GAP SERPL CALC-SCNC: 16 MMOL/L — HIGH (ref 7–14)
APTT BLD: 37.7 SEC — SIGNIFICANT CHANGE UP (ref 27–39.2)
AST SERPL-CCNC: 20 U/L — SIGNIFICANT CHANGE UP (ref 0–41)
AST SERPL-CCNC: 21 U/L — SIGNIFICANT CHANGE UP (ref 0–41)
BASOPHILS # BLD AUTO: 0.08 K/UL — SIGNIFICANT CHANGE UP (ref 0–0.2)
BASOPHILS # BLD AUTO: 0.1 K/UL — SIGNIFICANT CHANGE UP (ref 0–0.2)
BASOPHILS NFR BLD AUTO: 1.2 % — HIGH (ref 0–1)
BASOPHILS NFR BLD AUTO: 1.6 % — HIGH (ref 0–1)
BILIRUB SERPL-MCNC: 1.4 MG/DL — HIGH (ref 0.2–1.2)
BILIRUB SERPL-MCNC: 1.4 MG/DL — HIGH (ref 0.2–1.2)
BLD GP AB SCN SERPL QL: SIGNIFICANT CHANGE UP
BUN SERPL-MCNC: 15 MG/DL — SIGNIFICANT CHANGE UP (ref 10–20)
BUN SERPL-MCNC: 17 MG/DL — SIGNIFICANT CHANGE UP (ref 10–20)
CALCIUM SERPL-MCNC: 8.6 MG/DL — SIGNIFICANT CHANGE UP (ref 8.4–10.5)
CALCIUM SERPL-MCNC: 9.3 MG/DL — SIGNIFICANT CHANGE UP (ref 8.4–10.5)
CHLORIDE SERPL-SCNC: 94 MMOL/L — LOW (ref 98–110)
CHLORIDE SERPL-SCNC: 95 MMOL/L — LOW (ref 98–110)
CO2 SERPL-SCNC: 31 MMOL/L — SIGNIFICANT CHANGE UP (ref 17–32)
CO2 SERPL-SCNC: 31 MMOL/L — SIGNIFICANT CHANGE UP (ref 17–32)
CREAT SERPL-MCNC: 1.2 MG/DL — SIGNIFICANT CHANGE UP (ref 0.7–1.5)
CREAT SERPL-MCNC: 1.2 MG/DL — SIGNIFICANT CHANGE UP (ref 0.7–1.5)
EGFR: 53 ML/MIN/1.73M2 — LOW
EGFR: 53 ML/MIN/1.73M2 — LOW
EOSINOPHIL # BLD AUTO: 0.22 K/UL — SIGNIFICANT CHANGE UP (ref 0–0.7)
EOSINOPHIL # BLD AUTO: 0.23 K/UL — SIGNIFICANT CHANGE UP (ref 0–0.7)
EOSINOPHIL NFR BLD AUTO: 3.3 % — SIGNIFICANT CHANGE UP (ref 0–8)
EOSINOPHIL NFR BLD AUTO: 3.8 % — SIGNIFICANT CHANGE UP (ref 0–8)
GLUCOSE BLDC GLUCOMTR-MCNC: 101 MG/DL — HIGH (ref 70–99)
GLUCOSE BLDC GLUCOMTR-MCNC: 122 MG/DL — HIGH (ref 70–99)
GLUCOSE BLDC GLUCOMTR-MCNC: 127 MG/DL — HIGH (ref 70–99)
GLUCOSE BLDC GLUCOMTR-MCNC: 136 MG/DL — HIGH (ref 70–99)
GLUCOSE SERPL-MCNC: 128 MG/DL — HIGH (ref 70–99)
GLUCOSE SERPL-MCNC: 134 MG/DL — HIGH (ref 70–99)
HCT VFR BLD CALC: 38.5 % — SIGNIFICANT CHANGE UP (ref 37–47)
HCT VFR BLD CALC: 42.2 % — SIGNIFICANT CHANGE UP (ref 37–47)
HGB BLD-MCNC: 12 G/DL — SIGNIFICANT CHANGE UP (ref 12–16)
HGB BLD-MCNC: 13.3 G/DL — SIGNIFICANT CHANGE UP (ref 12–16)
IMM GRANULOCYTES NFR BLD AUTO: 0.3 % — SIGNIFICANT CHANGE UP (ref 0.1–0.3)
IMM GRANULOCYTES NFR BLD AUTO: 0.5 % — HIGH (ref 0.1–0.3)
INR BLD: 1.26 RATIO — SIGNIFICANT CHANGE UP (ref 0.65–1.3)
LYMPHOCYTES # BLD AUTO: 0.65 K/UL — LOW (ref 1.2–3.4)
LYMPHOCYTES # BLD AUTO: 0.65 K/UL — LOW (ref 1.2–3.4)
LYMPHOCYTES # BLD AUTO: 10.7 % — LOW (ref 20.5–51.1)
LYMPHOCYTES # BLD AUTO: 9.8 % — LOW (ref 20.5–51.1)
MAGNESIUM SERPL-MCNC: 1.9 MG/DL — SIGNIFICANT CHANGE UP (ref 1.8–2.4)
MAGNESIUM SERPL-MCNC: 2 MG/DL — SIGNIFICANT CHANGE UP (ref 1.8–2.4)
MCHC RBC-ENTMCNC: 28.6 PG — SIGNIFICANT CHANGE UP (ref 27–31)
MCHC RBC-ENTMCNC: 28.9 PG — SIGNIFICANT CHANGE UP (ref 27–31)
MCHC RBC-ENTMCNC: 31.2 G/DL — LOW (ref 32–37)
MCHC RBC-ENTMCNC: 31.5 G/DL — LOW (ref 32–37)
MCV RBC AUTO: 91.5 FL — SIGNIFICANT CHANGE UP (ref 81–99)
MCV RBC AUTO: 91.9 FL — SIGNIFICANT CHANGE UP (ref 81–99)
MONOCYTES # BLD AUTO: 0.55 K/UL — SIGNIFICANT CHANGE UP (ref 0.1–0.6)
MONOCYTES # BLD AUTO: 0.7 K/UL — HIGH (ref 0.1–0.6)
MONOCYTES NFR BLD AUTO: 11.5 % — HIGH (ref 1.7–9.3)
MONOCYTES NFR BLD AUTO: 8.3 % — SIGNIFICANT CHANGE UP (ref 1.7–9.3)
NEUTROPHILS # BLD AUTO: 4.4 K/UL — SIGNIFICANT CHANGE UP (ref 1.4–6.5)
NEUTROPHILS # BLD AUTO: 5.09 K/UL — SIGNIFICANT CHANGE UP (ref 1.4–6.5)
NEUTROPHILS NFR BLD AUTO: 72.1 % — SIGNIFICANT CHANGE UP (ref 42.2–75.2)
NEUTROPHILS NFR BLD AUTO: 76.9 % — HIGH (ref 42.2–75.2)
NRBC # BLD: 0 /100 WBCS — SIGNIFICANT CHANGE UP (ref 0–0)
NRBC # BLD: 0 /100 WBCS — SIGNIFICANT CHANGE UP (ref 0–0)
PLATELET # BLD AUTO: 166 K/UL — SIGNIFICANT CHANGE UP (ref 130–400)
PLATELET # BLD AUTO: 176 K/UL — SIGNIFICANT CHANGE UP (ref 130–400)
POTASSIUM SERPL-MCNC: 4.1 MMOL/L — SIGNIFICANT CHANGE UP (ref 3.5–5)
POTASSIUM SERPL-MCNC: 4.2 MMOL/L — SIGNIFICANT CHANGE UP (ref 3.5–5)
POTASSIUM SERPL-SCNC: 4.1 MMOL/L — SIGNIFICANT CHANGE UP (ref 3.5–5)
POTASSIUM SERPL-SCNC: 4.2 MMOL/L — SIGNIFICANT CHANGE UP (ref 3.5–5)
PROT SERPL-MCNC: 6.5 G/DL — SIGNIFICANT CHANGE UP (ref 6–8)
PROT SERPL-MCNC: 7.4 G/DL — SIGNIFICANT CHANGE UP (ref 6–8)
PROTHROM AB SERPL-ACNC: 14.5 SEC — HIGH (ref 9.95–12.87)
RBC # BLD: 4.19 M/UL — LOW (ref 4.2–5.4)
RBC # BLD: 4.61 M/UL — SIGNIFICANT CHANGE UP (ref 4.2–5.4)
RBC # FLD: 19.2 % — HIGH (ref 11.5–14.5)
RBC # FLD: 19.2 % — HIGH (ref 11.5–14.5)
SARS-COV-2 RNA SPEC QL NAA+PROBE: SIGNIFICANT CHANGE UP
SODIUM SERPL-SCNC: 137 MMOL/L — SIGNIFICANT CHANGE UP (ref 135–146)
SODIUM SERPL-SCNC: 141 MMOL/L — SIGNIFICANT CHANGE UP (ref 135–146)
VANCOMYCIN TROUGH SERPL-MCNC: 43.7 UG/ML — HIGH (ref 5–10)
WBC # BLD: 6.1 K/UL — SIGNIFICANT CHANGE UP (ref 4.8–10.8)
WBC # BLD: 6.62 K/UL — SIGNIFICANT CHANGE UP (ref 4.8–10.8)
WBC # FLD AUTO: 6.1 K/UL — SIGNIFICANT CHANGE UP (ref 4.8–10.8)
WBC # FLD AUTO: 6.62 K/UL — SIGNIFICANT CHANGE UP (ref 4.8–10.8)

## 2022-11-22 PROCEDURE — 99233 SBSQ HOSP IP/OBS HIGH 50: CPT

## 2022-11-22 RX ORDER — CEFTRIAXONE 500 MG/1
2000 INJECTION, POWDER, FOR SOLUTION INTRAMUSCULAR; INTRAVENOUS EVERY 24 HOURS
Refills: 0 | Status: DISCONTINUED | OUTPATIENT
Start: 2022-11-22 | End: 2022-11-23

## 2022-11-22 RX ORDER — MAGNESIUM SULFATE 500 MG/ML
2 VIAL (ML) INJECTION ONCE
Refills: 0 | Status: COMPLETED | OUTPATIENT
Start: 2022-11-22 | End: 2022-11-22

## 2022-11-22 RX ADMIN — Medication 40 MILLIGRAM(S): at 05:21

## 2022-11-22 RX ADMIN — Medication 25 GRAM(S): at 13:04

## 2022-11-22 RX ADMIN — DIVALPROEX SODIUM 250 MILLIGRAM(S): 500 TABLET, DELAYED RELEASE ORAL at 05:22

## 2022-11-22 RX ADMIN — SACUBITRIL AND VALSARTAN 1 TABLET(S): 24; 26 TABLET, FILM COATED ORAL at 05:21

## 2022-11-22 RX ADMIN — CEFTRIAXONE 100 MILLIGRAM(S): 500 INJECTION, POWDER, FOR SOLUTION INTRAMUSCULAR; INTRAVENOUS at 17:36

## 2022-11-22 RX ADMIN — PANTOPRAZOLE SODIUM 40 MILLIGRAM(S): 20 TABLET, DELAYED RELEASE ORAL at 05:33

## 2022-11-22 RX ADMIN — CEFEPIME 100 MILLIGRAM(S): 1 INJECTION, POWDER, FOR SOLUTION INTRAMUSCULAR; INTRAVENOUS at 05:21

## 2022-11-22 RX ADMIN — SACUBITRIL AND VALSARTAN 1 TABLET(S): 24; 26 TABLET, FILM COATED ORAL at 17:37

## 2022-11-22 RX ADMIN — Medication 100 MILLIGRAM(S): at 05:21

## 2022-11-22 RX ADMIN — CEFEPIME 100 MILLIGRAM(S): 1 INJECTION, POWDER, FOR SOLUTION INTRAMUSCULAR; INTRAVENOUS at 14:10

## 2022-11-22 RX ADMIN — Medication 250 MILLIGRAM(S): at 05:20

## 2022-11-22 RX ADMIN — ATORVASTATIN CALCIUM 80 MILLIGRAM(S): 80 TABLET, FILM COATED ORAL at 21:31

## 2022-11-22 RX ADMIN — ZOLPIDEM TARTRATE 5 MILLIGRAM(S): 10 TABLET ORAL at 21:33

## 2022-11-22 RX ADMIN — DIVALPROEX SODIUM 250 MILLIGRAM(S): 500 TABLET, DELAYED RELEASE ORAL at 17:37

## 2022-11-22 RX ADMIN — Medication 40 MILLIGRAM(S): at 14:10

## 2022-11-22 RX ADMIN — BUDESONIDE AND FORMOTEROL FUMARATE DIHYDRATE 2 PUFF(S): 160; 4.5 AEROSOL RESPIRATORY (INHALATION) at 13:04

## 2022-11-22 RX ADMIN — Medication 81 MILLIGRAM(S): at 12:12

## 2022-11-22 RX ADMIN — SPIRONOLACTONE 50 MILLIGRAM(S): 25 TABLET, FILM COATED ORAL at 05:22

## 2022-11-22 NOTE — PROGRESS NOTE ADULT - ASSESSMENT
57 year old female with PMH of COPD on 4 L home O2, CORNELIUS on CPAP, HFrEF (19% on TTE 02/2022, life vest at home), pulmonary HTN, CAD s/p PCI to RCA, HTN, HLD, CVA with residual LLE weakness, DM, active smoker on home O2, presented to the ED for SOB and LE and UE edema    # Acute HFrEF exacerbation   - previous TTE March 2022: 30-35% EF, moderate MR and TR  - On Lasix 40mg IV BID  - on Entresto and spironolactone  - on Toprol 100 mg daily  - PT saw her -> recommended STR    #LUE swelling  - Duplex Arterial -> No high grade stenosis  - Duplex Venous -> No DVT    # Cellulitis, infection of left sided AICD pocket site  # Bacteremia, blood culture growing gram positive cocci in pairs  - On IV cefepime and vancomycin, follow up vanco trough  - follow up blood culture, repeat blood culture daily  - Planned for YOKASTA and explant of ICD  - YOKASTA cancelled on 11/21 due to increased work of breathing, will f/u.    # Bilateral LE swelling  - likely 2/2 CHF  - Negative LE duplex    # Hypomagnesemia  - Replete as needed    # Chronic hypoxemic respiratory failure 2/2 COPD on 4 L at baseline   # CORNELIUS on CPAP   - CPAP ordered based on prior setting, PEEP 8 and FiO2 40%  - c/w Symbicort and Albuterol prn  - c/w oxygen supplementation    # CAD   - s/p PCI+stent to RCA last cath in hospital June 2021  - c/w aspirin and statin    # Elevated troponin  - likely from demand of CHF exacerbation     # Hx of CVA with residual LE weakness  - currently wheelchair bound   - c/w aspirin, statin     # DM II  - Low fasting sugar, Lantus held  - On sliding scale and standing pre-meals    # Hypothyroidism   - c/w levothyroxine, check TSH    # CKD stage 2, stable   -  monitor BMP    # Suspected seizure disorder vs. mood disorder  - divalproex picked up on sure scripts   - c/w divalproex 250 mg BID    # DVT ppx: SCD because she scheduled for Explant of ICD  # GI ppx: Pantoprazole  # Code status - Full Code  Handoff: Pending YOKASTA, and Explant of ICD, f/u ID

## 2022-11-22 NOTE — PRE-ANESTHESIA EVALUATION ADULT - NSANTHTOBACCOSD_GEN_ALL_CORE
smokes 1 cigarette daily, but reports she removes O2 before smoking. Advised patient against this./Yes

## 2022-11-22 NOTE — PHYSICAL THERAPY INITIAL EVALUATION ADULT - AMBULATION SKILLS, REHAB EVAL
pt stated she has been non-amb since her "strokes", pt used hospital bed and w/c at home/unable to perform

## 2022-11-22 NOTE — PHYSICAL THERAPY INITIAL EVALUATION ADULT - LEVEL OF INDEPENDENCE: SIT/STAND, REHAB EVAL
pt was sitting at the EOB for ~ 2min when pt requested to go on the bedpan, pt was able to maintain her balance at the EOB with CG/supervision, PCA assisted PT with getting pt on bedpan/unable to perform

## 2022-11-22 NOTE — PRE-ANESTHESIA EVALUATION ADULT - NSRADCARDRESULTSFT_GEN_ALL_CORE
11/19/22 Duplex UE:  Impression:    No evidence of deep or superficial thrombosis in the left upper extremity.    11/19/22 CXR:  IMPRESSION:    Bilateral prominent interstitial markings.    Enlarged cardiac silhouette which can reflect a pericardial effusion.    11/18/22: ECG    Diagnosis Line Atrial-sensed ventricular-paced rhythm  Abnormal ECG

## 2022-11-22 NOTE — PRE-ANESTHESIA EVALUATION ADULT - NSPROPOSEDPROCEDFT_GEN_ALL_CORE
Encounter Date: 4/10/2019       History     Chief Complaint   Patient presents with    Diarrhea     Diarrhea and vomiting begining this morning. pts mother denies fever.     CC: Vomiting, Diarrhea    HPI: Caitlin Tierney, a 9 y.o. female that presents to the ED for three episodes of NBNB emesis and 2 episodes of diarrhea since this morning. Reports associated nasal congestion. Grandmother reports not fevers or known sick contacts. Patient reports associated frontal headache and no abdominal pain. No medciations or treatments attempted prior to arrival. Immunizations are up to date.         The history is provided by the patient and a grandparent (Grandmother).     Review of patient's allergies indicates:  No Known Allergies  History reviewed. No pertinent past medical history.  History reviewed. No pertinent surgical history.  History reviewed. No pertinent family history.  Social History     Tobacco Use    Smoking status: Never Smoker   Substance Use Topics    Alcohol use: Not on file    Drug use: Not on file     Review of Systems   Constitutional: Negative for appetite change and fever.   HENT: Positive for congestion and rhinorrhea. Negative for trouble swallowing.    Respiratory: Negative for shortness of breath.    Gastrointestinal: Positive for diarrhea and vomiting. Negative for abdominal pain.   Genitourinary: Negative for dysuria.   Musculoskeletal: Negative for back pain, neck pain and neck stiffness.   Neurological: Positive for headaches. Negative for seizures, syncope and weakness.   Psychiatric/Behavioral: Negative for confusion.       Physical Exam     Initial Vitals [04/10/19 1103]   BP Pulse Resp Temp SpO2   110/69 (!) 129 20 98.4 °F (36.9 °C) 100 %      MAP       --         Physical Exam    Nursing note and vitals reviewed.  Constitutional: She appears well-developed and well-nourished. She is not diaphoretic. She is active and cooperative.  Non-toxic appearance. She does not have a sickly 
Explant of intra-cardiac device possible laser lead extraction possible sternotomy or thoracotomy, possible placement of epicardial pacemaker by thoracotomy or temporary exteriorize transvenous pacemaker
appearance. She does not appear ill. No distress.   HENT:   Head: Normocephalic and atraumatic. No signs of injury.   Right Ear: Tympanic membrane and canal normal. No hemotympanum.   Left Ear: Tympanic membrane and canal normal. No hemotympanum.   Nose: Rhinorrhea and congestion present.   Mouth/Throat: Mucous membranes are moist. No oropharyngeal exudate, pharynx swelling or pharynx erythema. No tonsillar exudate. Oropharynx is clear.   Eyes: Conjunctivae and EOM are normal. Pupils are equal, round, and reactive to light.   Neck: Normal range of motion and full passive range of motion without pain. Neck supple. No spinous process tenderness and no muscular tenderness present. No neck rigidity.   Cardiovascular: Normal rate and regular rhythm. Pulses are strong.    Pulses:       Radial pulses are 2+ on the right side, and 2+ on the left side.   Pulmonary/Chest: Effort normal and breath sounds normal. No stridor. No respiratory distress. Air movement is not decreased. She has no wheezes. She has no rhonchi. She has no rales. She exhibits no retraction.   Abdominal: Soft. She exhibits no distension and no mass. There is no tenderness. There is no rigidity, no rebound and no guarding. No hernia.   Musculoskeletal: Normal range of motion. She exhibits no edema, tenderness, deformity or signs of injury.   Lymphadenopathy: No anterior cervical adenopathy.   Neurological: She is alert and oriented for age. She has normal strength. No sensory deficit. Coordination normal. GCS eye subscore is 4. GCS verbal subscore is 5. GCS motor subscore is 6.   Skin: Skin is warm and dry. Capillary refill takes less than 2 seconds. No petechiae, no purpura and no rash noted. No cyanosis. No jaundice.   Psychiatric: She has a normal mood and affect. Her speech is normal and behavior is normal.         ED Course   Procedures  Labs Reviewed - No data to display       Imaging Results    None                APC / Resident Notes:   This is an 
evaluation of a 9 y.o. female that presents to the Emergency Department for vomiting diarrhea nasal congestion. Physical Exam shows a non-toxic, afebrile, and well appearing female.  Ears and throat without evidence infection.  Breath sounds are clear and equal. Heart regular rhythm. She moves all extremities.  Her abdomen is soft with no tenderness or grimacing on palpation.  She does 5 jumping jacks, smiling and laughing during this exercise.  Mucous membranes are moist and appears well-hydrated. Vital signs are reassuring. If available, previous records reviewed.  After medication patient reports no nausea.  She is tolerating fluids with no vomiting.    My overall impression is vomiting and diarrhea with nasal congestion-likely viral. I considered, but at this time, do not suspect OM, OE, strep pharyngitis, meningitis, pneumonia, bowel obstruction, bowel perforation, appendicitis.    ED Course:  Zofran.  [place] diet progress as tolerated. The diagnosis, treatment plan, instructions for follow-up and reevaluation with PCP as well as ED return precautions were discussed and understanding was verbalized. All questions or concerns have been addressed. MICHELLE Johnston, [facility]               ED Course as of Apr 10 1155   Wed Apr 10, 2019   1147 Patient drinking apple juice.  No vomiting.  Reports feeling better.    [AF]      ED Course User Index  [AF] [facility] Leon     Clinical Impression:       ICD-10-CM ICD-9-CM   1. Vomiting and diarrhea R11.10 787.03    R19.7 787.91   2. Nasal congestion R09.81 478.19         Disposition:   Disposition: Discharged  Condition: Stable                        [facility] Leon  04/10/19 1155    
YOKASTA

## 2022-11-22 NOTE — PHYSICAL THERAPY INITIAL EVALUATION ADULT - RANGE OF MOTION EXAMINATION, REHAB EVAL
R UE/LE WFL, pt appears to have residual L sided weakness, L UE shldr flex to <90*, some swelling noted t/o extremity, hand/elb grossly WFL, L LE hip/knee flex <90*, minimal ankle DF

## 2022-11-22 NOTE — PROGRESS NOTE ADULT - ASSESSMENT
58 yo F PMHx COPD on 4 L home O2, CORNELIUS on CPAP, chronic HFrEF (19% on TTE 02/2022, life vest at home), pulmonary HTN, CAD s/p PCI to RCA, HTN, HLD, CVA with residual LLE weakness, DM II, active smoker on home O2, presented to the ED for SOB and LE and UE edema. Pt found to have CHF exacerbation as well as cellulitis.     Acute on chronic HFrEF exacerbation   - previous TTE 3/2022: 30-35% EF, moderate MR and TR  - pt is known to me from prior admission, typically does well on Bumex. Change lasix to IV Bumex   - c/w entresto, spironolactone, metoprolol  - bipap now for tachypnea that developed prior to YOKASTA    Cellulitis, infection of left sided AICD pocket site  Bacteremia,   LUE cellulitis  - noted pt was given fluconazole and rifampin as outpatient from Oct 19, 2022  - c/w IV cefepime and vancomycin,  - vanco trough elevated to 31, decrease vanco dose  - follow up blood culture, repeat blood culture daily  - cultures growing coagulase negative staph and strept  - repeat cultures  - YOKASTA was scheduled for today to r/o endocarditis but was cancelled due to tachypnea  - scheduled for explant of AICD  - keep LUE elevated, pulses intact, distal extremity cool but not painful. arterial duplex showed no occlusion    Hypomagnesemia  - replete Mg today, monitor Mg level    Chronic hypoxemic respiratory failure 2/2 COPD on 4 L at baseline   CONRELIUS on CPAP   - CPAP ordered based on prior setting, PEEP 8 and FiO2 40%  - c/w symbicort and albuterol prn  - c/w oxygen supplementation    CAD s/p PCI  stent to RCA last cath in hospital 6/21/2021  - c/w aspirin and statin    Elevated troponin likely from demand of CHF exacerbation   - trend cardiac enzymes    CVA   residual LE weakness  currently wheelchair bound   - c/w aspirin, statin     DM II  - FS goal 110-180  - c/w insulin    Hypothyroidism   - c/w levothyroxine, check TSH    CKD III  - stable     Suspected seizure disorder vs. mood disorder  - divalproex picked up on [facility]   - c/w divalproex 250 mg BID    # DVT prophylaxis - SCD (duplex negative for DVT),   # GI prophylaxis - on protonix  # Code status - Full Code    #Progress Note Handoff:  Pending (specify): YOKASTA, pending explant  Family discussion: Discussed bipap, duplex, pending explant  Disposition: Home__x_/SNF___/Other________/Unknown at this time________

## 2022-11-22 NOTE — PROGRESS NOTE ADULT - SUBJECTIVE AND OBJECTIVE BOX
24H events:    Patient is a 57y old Female who presents with a chief complaint of CHrEF Exacerbation, and  suspected Cellulitis from [facility] site. (20 Nov 2022 13:30)    Primary diagnosis of CHF exacerbation       Today is hospital day 3d. This morning patient was seen and examined at bedside, resting comfortably in bed.      PAST MEDICAL & SURGICAL HISTORY  Hypertension    Hyperlipidemia    Anxiety and depression    COPD, severe    CHF (congestive heart failure)    Cerebrovascular accident (CVA)  Multiple    Type 2 diabetes mellitus    CKD (chronic kidney disease), stage II    No significant past surgical history      SOCIAL HISTORY:  Social History:      ALLERGIES:  No Known Allergies    MEDICATIONS:  STANDING MEDICATIONS  aspirin enteric coated 81 milliGRAM(s) Oral daily  atorvastatin 80 milliGRAM(s) Oral at bedtime  budesonide 160 MICROgram(s)/formoterol 4.5 MICROgram(s) Inhaler 2 Puff(s) Inhalation two times a day  cefepime   IVPB 2000 milliGRAM(s) IV Intermittent every 8 hours  divalproex  milliGRAM(s) Oral two times a day  furosemide   Injectable 40 milliGRAM(s) IV Push two times a day  influenza   Vaccine 0.5 milliLiter(s) IntraMuscular once  insulin lispro (ADMELOG) corrective regimen sliding scale   SubCutaneous three times a day before meals  insulin lispro Injectable (ADMELOG) 2 Unit(s) SubCutaneous three times a day before meals  magnesium sulfate  IVPB 2 Gram(s) IV Intermittent once  metoprolol succinate  milliGRAM(s) Oral daily  pantoprazole    Tablet 40 milliGRAM(s) Oral before breakfast  polyethylene glycol 3350 17 Gram(s) Oral daily  sacubitril 49 mG/valsartan 51 mG 1 Tablet(s) Oral two times a day  spironolactone 50 milliGRAM(s) Oral daily  vancomycin  IVPB 1000 milliGRAM(s) IV Intermittent every 12 hours  zolpidem 5 milliGRAM(s) Oral at bedtime    PRN MEDICATIONS  albuterol    90 MICROgram(s) HFA Inhaler 2 Puff(s) Inhalation every 6 hours PRN  zolpidem 5 milliGRAM(s) Oral at bedtime PRN        LABS:                        12.0   6.10  )-----------( 166      ( 22 Nov 2022 05:21 )             38.5     11-22    137  |  95<L>  |  15  ----------------------------<  134<H>  4.2   |  31  |  1.2    Ca    8.6      22 Nov 2022 05:21  Phos  3.0     11-21  Mg     1.9     11-22    TPro  6.5  /  Alb  3.8  /  TBili  1.4<H>  /  DBili  x   /  AST  20  /  ALT  9   /  AlkPhos  98  11-22              Culture - Blood (collected 19 Nov 2022 22:11)  Source: .Blood Blood  Preliminary Report (21 Nov 2022 07:02):    No growth to date.          RADIOLOGY:    VITALS:   T(F): 98  HR: 75  BP: 112/63  RR: 18  SpO2: 100%    PHYSICAL EXAM:    GENERAL: NAD, well-groomed, well-developed  HEAD:  Atraumatic, Normocephalic  EYES: EOMI  NECK: Supple  NERVOUS SYSTEM:  Alert & Oriented X3, motor 5/5 b/l upper and lower ext, sensation intact  CHEST/LUNG: Poor inspiratory effort  HEART: Regular rate and rhythm; No murmurs, rubs, or gallops  ABDOMEN: Soft, Nontender, Nondistended; Bowel sounds present  EXTREMITIES:  No clubbing, cyanosis, or edema  LYMPH: No lymphadenopathy noted  SKIN: No rashes or lesions

## 2022-11-22 NOTE — PROGRESS NOTE ADULT - SUBJECTIVE AND OBJECTIVE BOX
CHIEF COMPLAINT:    Patient is a 57y old  Female who presents with a chief complaint of CHrEF Exacerbation, and  suspected Cellulitis from AICD site.     INTERVAL HPI/OVERNIGHT EVENTS:    Patient seen and examined at bedside. No acute overnight events occurred.    ROS: Denies pain. All other systems are negative.    Medications:  Standing  aspirin enteric coated 81 milliGRAM(s) Oral daily  atorvastatin 80 milliGRAM(s) Oral at bedtime  budesonide 160 MICROgram(s)/formoterol 4.5 MICROgram(s) Inhaler 2 Puff(s) Inhalation two times a day  cefepime   IVPB 2000 milliGRAM(s) IV Intermittent every 8 hours  divalproex  milliGRAM(s) Oral two times a day  furosemide   Injectable 40 milliGRAM(s) IV Push two times a day  influenza   Vaccine 0.5 milliLiter(s) IntraMuscular once  insulin lispro (ADMELOG) corrective regimen sliding scale   SubCutaneous three times a day before meals  insulin lispro Injectable (ADMELOG) 2 Unit(s) SubCutaneous three times a day before meals  magnesium sulfate  IVPB 2 Gram(s) IV Intermittent once  metoprolol succinate  milliGRAM(s) Oral daily  pantoprazole    Tablet 40 milliGRAM(s) Oral before breakfast  polyethylene glycol 3350 17 Gram(s) Oral daily  sacubitril 49 mG/valsartan 51 mG 1 Tablet(s) Oral two times a day  spironolactone 50 milliGRAM(s) Oral daily  vancomycin  IVPB 1000 milliGRAM(s) IV Intermittent every 12 hours  zolpidem 5 milliGRAM(s) Oral at bedtime    PRN Meds  albuterol    90 MICROgram(s) HFA Inhaler 2 Puff(s) Inhalation every 6 hours PRN  zolpidem 5 milliGRAM(s) Oral at bedtime PRN        Vital Signs:    T(F): 98 (22 @ 05:03), Max: 98 (22 @ 05:03)  HR: 75 (22 @ 05:03) (75 - 90)  BP: 112/63 (22 @ 05:03) (112/63 - 127/64)  RR: 18 (22 @ 05:03) (18 - 18)  SpO2: --  I&O's Summary    2022 07:01  -  2022 07:00  --------------------------------------------------------  IN: 0 mL / OUT: 3270 mL / NET: -3270 mL    2022 07:01  -  2022 12:30  --------------------------------------------------------  IN: 384 mL / OUT: 1000 mL / NET: -616 mL      Daily     Daily Weight in k (2022 05:03)  CAPILLARY BLOOD GLUCOSE      POCT Blood Glucose.: 122 mg/dL (2022 12:03)  POCT Blood Glucose.: 127 mg/dL (2022 08:23)  POCT Blood Glucose.: 143 mg/dL (2022 21:51)  POCT Blood Glucose.: 126 mg/dL (2022 16:35)      PHYSICAL EXAM:  GENERAL:  NAD  SKIN: No rashes or lesions  HEENT: Atraumatic. Normocephalic. Anicteric  NECK:  No JVD.   PULMONARY: Clear to ausculation bilaterally. No wheezing. No rales  CVS: Normal S1, S2. Regular rate and rhythm. No murmurs.  ABDOMEN/GI: Soft, Nontender, Nondistended; Bowel sounds are present  EXTREMITIES:  LUE swelling, pulse intact, left arm redness  NEUROLOGIC:  No motor deficit.  PSYCH: Alert & oriented x 3, normal affect      LABS:                        12.0   6.10  )-----------( 166      ( 2022 05:21 )             38.5     11    137  |  95<L>  |  15  ----------------------------<  134<H>  4.2   |  31  |  1.2    Ca    8.6      2022 05:21  Phos  3.0     11-  Mg     1.9         TPro  6.5  /  Alb  3.8  /  TBili  1.4<H>  /  DBili  x   /  AST  20  /  ALT  9   /  AlkPhos  98        Serum Pro-Brain Natriuretic Peptide: 92651 pg/mL (22 @ 00:50)    Trop 0.03, CKMB --, CK --, 22 @ 06:35  Trop 0.02, CKMB --, CK --, 22 @ 22:09      Culture - Blood (collected 2022 22:11)  Source: .Blood Blood  Preliminary Report (2022 07:02):    No growth to date.        RADIOLOGY & ADDITIONAL TESTS:  Imaging or report Personally Reviewed:  [ ] YES  [ ] NO -->no new images    Telemetry reviewed independently - NSVT  EKG reviewed independently -->no new EKGs    Consultant(s) Notes Reviewed:  [ ] YES  [ ] NO  Care Discussed with Consultants/Other Providers [ ] YES  [ ] NO    Case discussed with resident  Care discussed with pt

## 2022-11-22 NOTE — PRE-ANESTHESIA EVALUATION ADULT - NSANTHPMHFT_GEN_ALL_CORE
57 year old female with PMH of COPD on 4 L home O2, CORNELIUS on CPAP, HFrEF (19% on TTE 02/2022, life vest at home), pulmonary HTN, CAD s/p PCI to RCA, HTN, HLD, CVA with residual LLE weakness, DM, active smoker on home O2 4L, presented to the ED for SOB and LE and UE edema. Notably, patient was here in 4/2022, but requested to be discharged prior to getting an AICD placed. in the interim, she had the AICD placed at [facility]. Patient began to experience SOB, and swelling in her arms and legs. SOB has worsened over the 3 days before the admission. Pt stated that she stopped her diuretic recently. Additionally, she noticed pain and "leaking" from the AICD site since it was placed 2 months ago associated with left UE swelling. Noted pt was also prescribed fluconazole and rifampin for 30 days supply from Oct 19, 2022 from [facility]. She denies fevers, chills, chest pain, or palpitations. Pt states that she was in SNF for 4 months and was discharged home with [facility] 3 weeks ago. Pt is currently dependent on motorized wheelchair and only able to stand on short period using walker.
Chart reviewed, ER and EP evaluation seen. Labs, EKG seen.  PMH: as above Severe COPD and CORNELIUS on home O2, CAD, S/P PCI and Stents X 2. CHFrEF.

## 2022-11-23 ENCOUNTER — RESULT REVIEW (OUTPATIENT)
Age: 57
End: 2022-11-23

## 2022-11-23 LAB
GLUCOSE BLDC GLUCOMTR-MCNC: 108 MG/DL — HIGH (ref 70–99)
GLUCOSE BLDC GLUCOMTR-MCNC: 113 MG/DL — HIGH (ref 70–99)
GLUCOSE BLDC GLUCOMTR-MCNC: 115 MG/DL — HIGH (ref 70–99)
GLUCOSE BLDC GLUCOMTR-MCNC: 94 MG/DL — SIGNIFICANT CHANGE UP (ref 70–99)
VANCOMYCIN TROUGH SERPL-MCNC: 31.3 UG/ML — HIGH (ref 5–10)
VANCOMYCIN TROUGH SERPL-MCNC: 37.7 UG/ML — HIGH (ref 5–10)

## 2022-11-23 PROCEDURE — 88305 TISSUE EXAM BY PATHOLOGIST: CPT | Mod: 26

## 2022-11-23 PROCEDURE — 99233 SBSQ HOSP IP/OBS HIGH 50: CPT

## 2022-11-23 PROCEDURE — 33241 REMOVE PULSE GENERATOR: CPT

## 2022-11-23 PROCEDURE — 33244 REMOVE ELCTRD TRANSVENOUSLY: CPT

## 2022-11-23 PROCEDURE — 71045 X-RAY EXAM CHEST 1 VIEW: CPT | Mod: 26

## 2022-11-23 RX ORDER — OXYCODONE HYDROCHLORIDE 5 MG/1
5 TABLET ORAL EVERY 6 HOURS
Refills: 0 | Status: DISCONTINUED | OUTPATIENT
Start: 2022-11-23 | End: 2022-11-30

## 2022-11-23 RX ORDER — INSULIN LISPRO 100/ML
VIAL (ML) SUBCUTANEOUS
Refills: 0 | Status: DISCONTINUED | OUTPATIENT
Start: 2022-11-23 | End: 2022-12-06

## 2022-11-23 RX ORDER — SPIRONOLACTONE 25 MG/1
50 TABLET, FILM COATED ORAL DAILY
Refills: 0 | Status: DISCONTINUED | OUTPATIENT
Start: 2022-11-23 | End: 2022-12-06

## 2022-11-23 RX ORDER — METOPROLOL TARTRATE 50 MG
100 TABLET ORAL DAILY
Refills: 0 | Status: DISCONTINUED | OUTPATIENT
Start: 2022-11-23 | End: 2022-12-06

## 2022-11-23 RX ORDER — INSULIN LISPRO 100/ML
2 VIAL (ML) SUBCUTANEOUS
Refills: 0 | Status: DISCONTINUED | OUTPATIENT
Start: 2022-11-23 | End: 2022-12-06

## 2022-11-23 RX ORDER — LORATADINE 10 MG/1
10 TABLET ORAL DAILY
Refills: 0 | Status: DISCONTINUED | OUTPATIENT
Start: 2022-11-23 | End: 2022-12-06

## 2022-11-23 RX ORDER — POLYETHYLENE GLYCOL 3350 17 G/17G
17 POWDER, FOR SOLUTION ORAL DAILY
Refills: 0 | Status: DISCONTINUED | OUTPATIENT
Start: 2022-11-23 | End: 2022-12-06

## 2022-11-23 RX ORDER — SACUBITRIL AND VALSARTAN 24; 26 MG/1; MG/1
1 TABLET, FILM COATED ORAL
Refills: 0 | Status: DISCONTINUED | OUTPATIENT
Start: 2022-11-23 | End: 2022-12-06

## 2022-11-23 RX ORDER — ASPIRIN/CALCIUM CARB/MAGNESIUM 324 MG
81 TABLET ORAL DAILY
Refills: 0 | Status: DISCONTINUED | OUTPATIENT
Start: 2022-11-23 | End: 2022-12-06

## 2022-11-23 RX ORDER — HYDROMORPHONE HYDROCHLORIDE 2 MG/ML
0.5 INJECTION INTRAMUSCULAR; INTRAVENOUS; SUBCUTANEOUS
Refills: 0 | Status: DISCONTINUED | OUTPATIENT
Start: 2022-11-23 | End: 2022-11-23

## 2022-11-23 RX ORDER — PANTOPRAZOLE SODIUM 20 MG/1
40 TABLET, DELAYED RELEASE ORAL
Refills: 0 | Status: DISCONTINUED | OUTPATIENT
Start: 2022-11-23 | End: 2022-12-06

## 2022-11-23 RX ORDER — FUROSEMIDE 40 MG
40 TABLET ORAL
Refills: 0 | Status: DISCONTINUED | OUTPATIENT
Start: 2022-11-23 | End: 2022-11-27

## 2022-11-23 RX ORDER — CEFTRIAXONE 500 MG/1
2000 INJECTION, POWDER, FOR SOLUTION INTRAMUSCULAR; INTRAVENOUS EVERY 24 HOURS
Refills: 0 | Status: DISCONTINUED | OUTPATIENT
Start: 2022-11-23 | End: 2022-11-25

## 2022-11-23 RX ORDER — BUDESONIDE AND FORMOTEROL FUMARATE DIHYDRATE 160; 4.5 UG/1; UG/1
2 AEROSOL RESPIRATORY (INHALATION)
Refills: 0 | Status: DISCONTINUED | OUTPATIENT
Start: 2022-11-23 | End: 2022-12-06

## 2022-11-23 RX ORDER — ALBUTEROL 90 UG/1
2 AEROSOL, METERED ORAL EVERY 6 HOURS
Refills: 0 | Status: DISCONTINUED | OUTPATIENT
Start: 2022-11-23 | End: 2022-12-06

## 2022-11-23 RX ORDER — CEFAZOLIN SODIUM 1 G
1000 VIAL (EA) INJECTION EVERY 8 HOURS
Refills: 0 | Status: COMPLETED | OUTPATIENT
Start: 2022-11-23 | End: 2022-11-24

## 2022-11-23 RX ORDER — DIVALPROEX SODIUM 500 MG/1
250 TABLET, DELAYED RELEASE ORAL
Refills: 0 | Status: DISCONTINUED | OUTPATIENT
Start: 2022-11-23 | End: 2022-12-06

## 2022-11-23 RX ORDER — LORATADINE 10 MG/1
10 TABLET ORAL DAILY
Refills: 0 | Status: DISCONTINUED | OUTPATIENT
Start: 2022-11-23 | End: 2022-11-23

## 2022-11-23 RX ORDER — ATORVASTATIN CALCIUM 80 MG/1
80 TABLET, FILM COATED ORAL AT BEDTIME
Refills: 0 | Status: DISCONTINUED | OUTPATIENT
Start: 2022-11-23 | End: 2022-12-06

## 2022-11-23 RX ORDER — ENOXAPARIN SODIUM 100 MG/ML
40 INJECTION SUBCUTANEOUS EVERY 24 HOURS
Refills: 0 | Status: DISCONTINUED | OUTPATIENT
Start: 2022-11-23 | End: 2022-12-06

## 2022-11-23 RX ORDER — ZOLPIDEM TARTRATE 10 MG/1
5 TABLET ORAL AT BEDTIME
Refills: 0 | Status: DISCONTINUED | OUTPATIENT
Start: 2022-11-23 | End: 2022-11-30

## 2022-11-23 RX ORDER — ACETAMINOPHEN 500 MG
650 TABLET ORAL EVERY 6 HOURS
Refills: 0 | Status: DISCONTINUED | OUTPATIENT
Start: 2022-11-23 | End: 2022-12-05

## 2022-11-23 RX ADMIN — Medication 40 MILLIGRAM(S): at 05:13

## 2022-11-23 RX ADMIN — HYDROMORPHONE HYDROCHLORIDE 0.5 MILLIGRAM(S): 2 INJECTION INTRAMUSCULAR; INTRAVENOUS; SUBCUTANEOUS at 11:48

## 2022-11-23 RX ADMIN — BUDESONIDE AND FORMOTEROL FUMARATE DIHYDRATE 2 PUFF(S): 160; 4.5 AEROSOL RESPIRATORY (INHALATION) at 21:45

## 2022-11-23 RX ADMIN — ATORVASTATIN CALCIUM 80 MILLIGRAM(S): 80 TABLET, FILM COATED ORAL at 21:45

## 2022-11-23 RX ADMIN — SACUBITRIL AND VALSARTAN 1 TABLET(S): 24; 26 TABLET, FILM COATED ORAL at 17:34

## 2022-11-23 RX ADMIN — SACUBITRIL AND VALSARTAN 1 TABLET(S): 24; 26 TABLET, FILM COATED ORAL at 05:13

## 2022-11-23 RX ADMIN — DIVALPROEX SODIUM 250 MILLIGRAM(S): 500 TABLET, DELAYED RELEASE ORAL at 05:14

## 2022-11-23 RX ADMIN — OXYCODONE HYDROCHLORIDE 5 MILLIGRAM(S): 5 TABLET ORAL at 20:29

## 2022-11-23 RX ADMIN — OXYCODONE HYDROCHLORIDE 5 MILLIGRAM(S): 5 TABLET ORAL at 13:25

## 2022-11-23 RX ADMIN — SPIRONOLACTONE 50 MILLIGRAM(S): 25 TABLET, FILM COATED ORAL at 05:13

## 2022-11-23 RX ADMIN — LORATADINE 10 MILLIGRAM(S): 10 TABLET ORAL at 06:07

## 2022-11-23 RX ADMIN — Medication 81 MILLIGRAM(S): at 13:13

## 2022-11-23 RX ADMIN — PANTOPRAZOLE SODIUM 40 MILLIGRAM(S): 20 TABLET, DELAYED RELEASE ORAL at 05:13

## 2022-11-23 RX ADMIN — OXYCODONE HYDROCHLORIDE 5 MILLIGRAM(S): 5 TABLET ORAL at 21:00

## 2022-11-23 RX ADMIN — Medication 100 MILLIGRAM(S): at 05:13

## 2022-11-23 RX ADMIN — POLYETHYLENE GLYCOL 3350 17 GRAM(S): 17 POWDER, FOR SOLUTION ORAL at 13:13

## 2022-11-23 RX ADMIN — ENOXAPARIN SODIUM 40 MILLIGRAM(S): 100 INJECTION SUBCUTANEOUS at 17:34

## 2022-11-23 RX ADMIN — Medication 40 MILLIGRAM(S): at 11:31

## 2022-11-23 RX ADMIN — ZOLPIDEM TARTRATE 5 MILLIGRAM(S): 10 TABLET ORAL at 21:45

## 2022-11-23 RX ADMIN — Medication 100 MILLIGRAM(S): at 15:37

## 2022-11-23 RX ADMIN — Medication 100 MILLIGRAM(S): at 21:45

## 2022-11-23 RX ADMIN — Medication 2 UNIT(S): at 08:06

## 2022-11-23 RX ADMIN — HYDROMORPHONE HYDROCHLORIDE 0.5 MILLIGRAM(S): 2 INJECTION INTRAMUSCULAR; INTRAVENOUS; SUBCUTANEOUS at 12:18

## 2022-11-23 RX ADMIN — BUDESONIDE AND FORMOTEROL FUMARATE DIHYDRATE 2 PUFF(S): 160; 4.5 AEROSOL RESPIRATORY (INHALATION) at 08:08

## 2022-11-23 RX ADMIN — OXYCODONE HYDROCHLORIDE 5 MILLIGRAM(S): 5 TABLET ORAL at 13:55

## 2022-11-23 RX ADMIN — CEFTRIAXONE 100 MILLIGRAM(S): 500 INJECTION, POWDER, FOR SOLUTION INTRAMUSCULAR; INTRAVENOUS at 13:14

## 2022-11-23 RX ADMIN — DIVALPROEX SODIUM 250 MILLIGRAM(S): 500 TABLET, DELAYED RELEASE ORAL at 17:34

## 2022-11-23 NOTE — BRIEF OPERATIVE NOTE - NSICDXBRIEFPROCEDURE_GEN_ALL_CORE_FT
PROCEDURES:  Removal of dual chamber implantable cardioverter-defibrillator (ICD) 23-Nov-2022 11:14:23  Pedro Snider

## 2022-11-23 NOTE — PROGRESS NOTE ADULT - ASSESSMENT
56 yo F PMHx COPD on 4 L home O2, CORNELIUS on CPAP, chronic HFrEF (19% on TTE 02/2022, life vest at home), pulmonary HTN, CAD s/p PCI to RCA, HTN, HLD, CVA with residual LLE weakness, DM II, active smoker on home O2, presented to the ED for SOB and LE and UE edema. Pt found to have CHF exacerbation as well as cellulitis.     A/P:   Acute on chronic HFrEF exacerbation   SOB,   Echo 3/2022: 30-35% EF, moderate MR and TR  Continue Bumex IV.   Continue entresto, spironolactone, metoprolol  - bipap now for tachypnea that developed prior to YOKASTA    Left sided AICD cellulitis pocket abscess:   Bacteremia: coag negative staph,   LUE cellulitis  s/p AICD extraction 11/23/22.   On Rocephin and Vancomycin. Vancomycin level is high, resume with lower dose.   Pending YOKASTA.  ID follow up.     Chronic hypoxemic respiratory failure 2/2 COPD on 4 L at baseline   CORNELIUS on CPAP   CPAP ordered based on prior setting, PEEP 8 and FiO2 40%  Continue Symbicort and albuterol prn, continue nasal canula.   CAD s/p PCI  stent to RCA last cath in hospital 6/21/2021  Continue Aspirin, Metoprolol and statin    History of CVA   Residual left side weakness  Continue ASA and Lipitor.      DM II  - FS goal 110-180      Hypothyroidism:  levothyroxine, check TSH    CKD III: Stable     Suspected mood disorder  Continue divalproex 250 mg BID    DVT prophylaxis  GI prophylaxis - on protonix  Code status - Full Code    #Progress Note Handoff:  Pending (specify): YOKASTA  Family discussion:  Disposition: Home.

## 2022-11-23 NOTE — PROGRESS NOTE ADULT - ASSESSMENT
57 year old female with PMH of COPD on 4 L home O2, CORNELIUS on CPAP, HFrEF (19% on TTE 02/2022, life vest at home), pulmonary HTN, CAD s/p PCI to RCA, HTN, HLD, CVA with residual LLE weakness, DM, active smoker on home O2, presented to the ED for SOB and LE and UE edema    # Acute HFrEF exacerbation   - previous TTE March 2022: 30-35% EF, moderate MR and TR  - On Lasix 40mg IV BID  - on Entresto and spironolactone  - on Toprol 100 mg daily  - PT saw her -> recommended STR    #LUE swelling  - Duplex Arterial -> No high grade stenosis  - Duplex Venous -> No DVT    # Cellulitis, infection of left sided AICD pocket site  # Bacteremia, blood culture growing Strep spp. and Coag negative Staph  - On Vancomycin (trough 43 yesterday, Vanco held). Was on Cefepime, changed to ceftriaxone on 11/22  - follow up blood culture (Nov 19 and Nov 21 so far negative)  - YOKASTA cancelled on 11/21 due to increased work of breathing, will f/u.  - Planned for Explantation today    # Bilateral LE swelling  - likely 2/2 CHF  - Negative LE duplex    # Hypomagnesemia  - Replete as needed    # Chronic hypoxemic respiratory failure 2/2 COPD on 4 L at baseline   # CORNELIUS on CPAP   - CPAP ordered based on prior setting, PEEP 8 and FiO2 40%  - c/w Symbicort and Albuterol prn  - c/w oxygen supplementation    # CAD   - s/p PCI+stent to RCA last cath in hospital June 2021  - c/w aspirin and statin    # Elevated troponin  - likely from demand of CHF exacerbation     # Hx of CVA with residual LE weakness  - currently wheelchair bound   - c/w aspirin, statin     # DM II  - Low fasting sugar, Lantus held  - On sliding scale and standing pre-meals    # Hypothyroidism   - c/w levothyroxine    # CKD stage 2, stable   -  monitor BMP    # Suspected seizure disorder vs. mood disorder  - divalproex picked up on sure scripts   - c/w divalproex 250 mg BID    # DVT ppx: SCD because she scheduled for Explant of ICD  # GI ppx: Pantoprazole  # Code status - Full Code  Handoff: Pending YOKASTA, and Explant of ICD 57 year old female with PMH of COPD on 4 L home O2, CORNELIUS on CPAP, HFrEF (19% on TTE 02/2022, s/p ICD, pulmonary HTN, CAD s/p PCI to RCA, HTN, HLD, CVA with residual LLE weakness, DM, active smoker on home O2, presented to the ED for SOB and LE and UE edema    # Acute HFrEF exacerbation   - previous TTE March 2022: 30-35% EF, moderate MR and TR  - On Lasix 40mg IV BID  - on Entresto and spironolactone  - on Toprol 100 mg daily  - PT saw her -> recommended STR    #LUE swelling  - Duplex Arterial -> No high grade stenosis  - Duplex Venous -> No DVT    # Cellulitis, infection of left sided AICD pocket site  # Bacteremia, blood culture growing Strep spp. and Coag negative Staph  - On Vancomycin (trough 43 yesterday, Vanco held). Was on Cefepime, changed to ceftriaxone on 11/22  - follow up blood culture (Nov 19 and Nov 21 so far negative)  - YOKASTA cancelled on 11/21 due to increased work of breathing, will f/u.  - Planned for Explantation today    # Bilateral LE swelling  - likely 2/2 CHF  - Negative LE duplex    # Hypomagnesemia  - Replete as needed    # Chronic hypoxemic respiratory failure 2/2 COPD on 4 L at baseline   # CORNELIUS on CPAP   - CPAP ordered based on prior setting, PEEP 8 and FiO2 40%  - c/w Symbicort and Albuterol prn  - c/w oxygen supplementation    # CAD   - s/p PCI+stent to RCA last cath in hospital June 2021  - c/w aspirin and statin    # Elevated troponin  - likely from demand of CHF exacerbation     # Hx of CVA with residual LE weakness  - currently wheelchair bound   - c/w aspirin, statin     # DM II  - Low fasting sugar, Lantus held  - On sliding scale and standing pre-meals    # Hypothyroidism   - c/w levothyroxine    # CKD stage 2, stable   -  monitor BMP    # Suspected seizure disorder vs. mood disorder  - divalproex picked up on sure scripts   - c/w divalproex 250 mg BID    # DVT ppx: SCD because she scheduled for Explant of ICD  # GI ppx: Pantoprazole  # Code status - Full Code  Handoff: Pending YOKASTA, and Explant of ICD 57 year old female with PMH of COPD on 4 L home O2, CORNELIUS on CPAP, HFrEF (19% on TTE 02/2022, s/p ICD, pulmonary HTN, CAD s/p PCI to RCA, HTN, HLD, CVA with residual LLE weakness, DM, active smoker on home O2, presented to the ED for SOB and LE and UE edema    # Acute HFrEF exacerbation   - previous TTE March 2022: 30-35% EF, moderate MR and TR  - On Lasix 40mg IV BID  - on Entresto and spironolactone  - on Toprol 100 mg daily  - PT saw her -> recommended STR    #LUE swelling  - Duplex Arterial -> No high grade stenosis  - Duplex Venous -> No DVT    # Cellulitis, infection of left sided AICD pocket site  # Bacteremia, blood culture growing Strep spp. and Coag negative Staph  - On Vancomycin (trough 43 yesterday, Vanco held). Was on Cefepime, changed to ceftriaxone on 11/22  - follow up blood culture (Nov 19 and Nov 21 so far negative)  - YOKASTA cancelled on 11/21 due to increased work of breathing, will f/u, if not done pt might need abx treatment for endocarditis, will f/u with ID  - Planned for Explantation today    # Bilateral LE swelling  - likely 2/2 CHF  - Negative LE duplex    # Hypomagnesemia  - Replete as needed    # Chronic hypoxemic respiratory failure 2/2 COPD on 4 L at baseline   # CORNELIUS on CPAP   - CPAP ordered based on prior setting, PEEP 8 and FiO2 40%  - c/w Symbicort and Albuterol prn  - c/w oxygen supplementation    # CAD   - s/p PCI+stent to RCA last cath in hospital June 2021  - c/w aspirin and statin    # Elevated troponin  - likely from demand of CHF exacerbation     # Hx of CVA with residual LE weakness  - currently wheelchair bound   - c/w aspirin, statin     # DM II  - Low fasting sugar, Lantus held  - On sliding scale and standing pre-meals    # Hypothyroidism   - c/w levothyroxine    # CKD stage 2, stable   -  monitor BMP    # Suspected seizure disorder vs. mood disorder  - divalproex picked up on sure scripts   - c/w divalproex 250 mg BID    # DVT ppx: SCD because she scheduled for Explant of ICD  # GI ppx: Pantoprazole  # Code status - Full Code  Handoff: Pending YOKASTA, and Explant of ICD

## 2022-11-23 NOTE — PROGRESS NOTE ADULT - SUBJECTIVE AND OBJECTIVE BOX
USMAN WILLIAN  57y  Female      Patient is a 57y old  Female who presents with a chief complaint of CHrEF Exacerbation, and  suspected Cellulitis from AICD site. (20 Nov 2022 13:30)      INTERVAL HPI/OVERNIGHT EVENTS:  She went for AICD removal today.   Vital Signs Last 24 Hrs  T(C): 36.6 (23 Nov 2022 14:49), Max: 36.6 (23 Nov 2022 11:08)  T(F): 97.8 (23 Nov 2022 14:49), Max: 97.8 (23 Nov 2022 11:08)  HR: 74 (23 Nov 2022 17:15) (74 - 93)  BP: 160/79 (23 Nov 2022 17:15) (114/71 - 160/79)  BP(mean): --  RR: 18 (23 Nov 2022 14:49) (14 - 23)  SpO2: 94% (23 Nov 2022 12:15) (93% - 95%)    Parameters below as of 23 Nov 2022 12:15  Patient On (Oxygen Delivery Method): room air          11-22-22 @ 07:01  -  11-23-22 @ 07:00  --------------------------------------------------------  IN: 384 mL / OUT: 4150 mL / NET: -3766 mL    11-23-22 @ 07:01  -  11-23-22 @ 18:01  --------------------------------------------------------  IN: 0 mL / OUT: 2000 mL / NET: -2000 mL            Consultant(s) Notes Reviewed:  [x ] YES  [ ] NO          MEDICATIONS  (STANDING):  aspirin enteric coated 81 milliGRAM(s) Oral daily  atorvastatin 80 milliGRAM(s) Oral at bedtime  budesonide 160 MICROgram(s)/formoterol 4.5 MICROgram(s) Inhaler 2 Puff(s) Inhalation two times a day  ceFAZolin   IVPB 1000 milliGRAM(s) IV Intermittent every 8 hours  cefTRIAXone   IVPB 2000 milliGRAM(s) IV Intermittent every 24 hours  divalproex  milliGRAM(s) Oral two times a day  enoxaparin Injectable 40 milliGRAM(s) SubCutaneous every 24 hours  furosemide   Injectable 40 milliGRAM(s) IV Push two times a day  influenza   Vaccine 0.5 milliLiter(s) IntraMuscular once  insulin lispro (ADMELOG) corrective regimen sliding scale   SubCutaneous three times a day before meals  insulin lispro Injectable (ADMELOG) 2 Unit(s) SubCutaneous three times a day before meals  metoprolol succinate  milliGRAM(s) Oral daily  pantoprazole    Tablet 40 milliGRAM(s) Oral before breakfast  polyethylene glycol 3350 17 Gram(s) Oral daily  sacubitril 49 mG/valsartan 51 mG 1 Tablet(s) Oral two times a day  spironolactone 50 milliGRAM(s) Oral daily  zolpidem 5 milliGRAM(s) Oral at bedtime    MEDICATIONS  (PRN):  acetaminophen     Tablet .. 650 milliGRAM(s) Oral every 6 hours PRN Temp greater or equal to 38C (100.4F), Mild Pain (1 - 3)  albuterol    90 MICROgram(s) HFA Inhaler 2 Puff(s) Inhalation every 6 hours PRN Bronchospasm  loratadine 10 milliGRAM(s) Oral daily PRN itchiness  oxyCODONE    IR 5 milliGRAM(s) Oral every 6 hours PRN Moderate Pain (4 - 6)      LABS                          13.3   6.62  )-----------( 176      ( 22 Nov 2022 22:00 )             42.2     11-22    141  |  94<L>  |  17  ----------------------------<  128<H>  4.1   |  31  |  1.2    Ca    9.3      22 Nov 2022 22:00  Mg     2.0     11-22    TPro  7.4  /  Alb  3.9  /  TBili  1.4<H>  /  DBili  x   /  AST  21  /  ALT  11  /  AlkPhos  110  11-22        PT/INR - ( 22 Nov 2022 22:00 )   PT: 14.50 sec;   INR: 1.26 ratio         PTT - ( 22 Nov 2022 22:00 )  PTT:37.7 sec  Lactate Trend        CAPILLARY BLOOD GLUCOSE      POCT Blood Glucose.: 94 mg/dL (23 Nov 2022 16:21)      Culture - Blood (collected 11-22-22 @ 05:21)  Source: .Blood None  Preliminary Report (11-23-22 @ 15:06):    No growth to date.    Culture - Blood (collected 11-21-22 @ 07:30)  Source: .Blood Blood-Peripheral  Preliminary Report (11-22-22 @ 23:01):    No growth to date.    Culture - Blood (collected 11-19-22 @ 22:11)  Source: .Blood Blood  Preliminary Report (11-21-22 @ 07:02):    No growth to date.    Culture - Blood (collected 11-19-22 @ 04:59)  Source: .Blood Blood-Peripheral  Gram Stain (11-20-22 @ 06:53):    Growth in anaerobic bottle: Gram positive cocci in pairs  Final Report (11-21-22 @ 12:08):    Growth in anaerobic bottle: Staphylococcus haemolyticus Coag Negative    Staphylococcus    Single set isolate, possible contaminant. Contact    Microbiology if susceptibility testing clinically    indicated.    Growth in anaerobic bottle: Streptococcus salivarius/vestibularis group    Growth in anaerobic bottle: Streptococcus mitis/oralis group    Alpha hemolytic strep    (not Strep. pneumoniae or Enterococcus)    Single set isolate, possible contaminant. Contact    Microbiology if susceptibility testing clinically    indicated.    ***Blood Panel PCR results on this specimen are available    approximately 3 hours after the Gram stain result.***    Gram stain, PCR, and/or culture results may not always    correspond due to difference in methodologies.    ************************************************************    This PCR assay was performed by multiplex PCR. This    Assay tests for 66 bacterial and resistance gene targets.    Please refer to the [facility] test directory    at [email or web address] for details.  Organism: Blood Culture PCR (11-21-22 @ 12:08)  Organism: Blood Culture PCR (11-21-22 @ 12:08)      -  Coagulase negative Staphylococcus: Detec      -  Streptococcus sp. (Not Grp A, B or S pneumoniae): Detec      Method Type: PCR    Culture - Blood (collected 11-19-22 @ 04:59)  Source: .Blood Blood-Peripheral  Preliminary Report (11-20-22 @ 13:01):    No growth to date.        RADIOLOGY & ADDITIONAL TESTS:    Imaging Personally Reviewed:  [ ] YES  [ ] NO    HEALTH ISSUES - PROBLEM Dx:          PHYSICAL EXAM:  GENERAL: NAD, well-developed.  HEAD:  Atraumatic, Normocephalic.  EYES: EOMI, PERRLA, conjunctiva and sclera clear.  NECK: Supple, No JVD.  CHEST/LUNG: bilateral ronchi  HEART: Regular rate and rhythm; S1 S2.   ABDOMEN: Soft, Nontender, Nondistended; Bowel sounds present.  EXTREMITIES:  2+ Peripheral Pulses, No clubbing, cyanosis, or edema.  PSYCH: AAOx3.  NEUROLOGY: chronic left side weakness.   SKIN: No rashes or lesions.

## 2022-11-23 NOTE — PROGRESS NOTE ADULT - SUBJECTIVE AND OBJECTIVE BOX
24H events:    Patient is a 57y old Female who presents with a chief complaint of CHrEF Exacerbation, and  suspected Cellulitis from [facility] site. (20 Nov 2022 13:30)    Primary diagnosis of CHF exacerbation       Today is hospital day 4d. This morning patient was seen and examined at bedside, resting comfortably in bed.      PAST MEDICAL & SURGICAL HISTORY  Hypertension    Hyperlipidemia    Anxiety and depression    COPD, severe    CHF (congestive heart failure)    Cerebrovascular accident (CVA)  Multiple    Type 2 diabetes mellitus    CKD (chronic kidney disease), stage II    No significant past surgical history      SOCIAL HISTORY:  Social History:      ALLERGIES:  No Known Allergies    MEDICATIONS:  STANDING MEDICATIONS  influenza   Vaccine 0.5 milliLiter(s) IntraMuscular once    PRN MEDICATIONS        LABS:                        13.3   6.62  )-----------( 176      ( 22 Nov 2022 22:00 )             42.2     11-22    141  |  94<L>  |  17  ----------------------------<  128<H>  4.1   |  31  |  1.2    Ca    9.3      22 Nov 2022 22:00  Mg     2.0     11-22    TPro  7.4  /  Alb  3.9  /  TBili  1.4<H>  /  DBili  x   /  AST  21  /  ALT  11  /  AlkPhos  110  11-22    PT/INR - ( 22 Nov 2022 22:00 )   PT: 14.50 sec;   INR: 1.26 ratio         PTT - ( 22 Nov 2022 22:00 )  PTT:37.7 sec          Culture - Blood (collected 21 Nov 2022 07:30)  Source: .Blood Blood-Peripheral  Preliminary Report (22 Nov 2022 23:01):    No growth to date.          RADIOLOGY:    VITALS:   T(F): 97.6  HR: 91  BP: 138/81  RR: 16  SpO2: 95%    PHYSICAL EXAM:    GENERAL: NAD, well-groomed, well-developed  HEAD:  Atraumatic, Normocephalic  EYES: EOMI  NECK: Supple  NERVOUS SYSTEM:  Alert & Oriented X3, motor 5/5 b/l upper and lower ext, sensation intact  CHEST/LUNG: Poor inspiratory effort  HEART: Regular rate and rhythm; No murmurs, rubs, or gallops  ABDOMEN: Soft, Nontender, Nondistended; Bowel sounds present  EXTREMITIES:  No clubbing, cyanosis, or edema  LYMPH: No lymphadenopathy noted  SKIN: No rashes or lesions

## 2022-11-24 LAB
ALBUMIN SERPL ELPH-MCNC: 3.9 G/DL — SIGNIFICANT CHANGE UP (ref 3.5–5.2)
ALP SERPL-CCNC: 102 U/L — SIGNIFICANT CHANGE UP (ref 30–115)
ALT FLD-CCNC: 8 U/L — SIGNIFICANT CHANGE UP (ref 0–41)
ANION GAP SERPL CALC-SCNC: 13 MMOL/L — SIGNIFICANT CHANGE UP (ref 7–14)
AST SERPL-CCNC: 19 U/L — SIGNIFICANT CHANGE UP (ref 0–41)
BASOPHILS # BLD AUTO: 0.09 K/UL — SIGNIFICANT CHANGE UP (ref 0–0.2)
BASOPHILS NFR BLD AUTO: 1.5 % — HIGH (ref 0–1)
BILIRUB SERPL-MCNC: 1.4 MG/DL — HIGH (ref 0.2–1.2)
BUN SERPL-MCNC: 18 MG/DL — SIGNIFICANT CHANGE UP (ref 10–20)
CALCIUM SERPL-MCNC: 9.5 MG/DL — SIGNIFICANT CHANGE UP (ref 8.4–10.5)
CHLORIDE SERPL-SCNC: 91 MMOL/L — LOW (ref 98–110)
CO2 SERPL-SCNC: 36 MMOL/L — HIGH (ref 17–32)
CREAT SERPL-MCNC: 1.2 MG/DL — SIGNIFICANT CHANGE UP (ref 0.7–1.5)
CULTURE RESULTS: SIGNIFICANT CHANGE UP
EGFR: 53 ML/MIN/1.73M2 — LOW
EOSINOPHIL # BLD AUTO: 0.13 K/UL — SIGNIFICANT CHANGE UP (ref 0–0.7)
EOSINOPHIL NFR BLD AUTO: 2.2 % — SIGNIFICANT CHANGE UP (ref 0–8)
GLUCOSE BLDC GLUCOMTR-MCNC: 102 MG/DL — HIGH (ref 70–99)
GLUCOSE BLDC GLUCOMTR-MCNC: 109 MG/DL — HIGH (ref 70–99)
GLUCOSE BLDC GLUCOMTR-MCNC: 95 MG/DL — SIGNIFICANT CHANGE UP (ref 70–99)
GLUCOSE SERPL-MCNC: 94 MG/DL — SIGNIFICANT CHANGE UP (ref 70–99)
HCT VFR BLD CALC: 38.3 % — SIGNIFICANT CHANGE UP (ref 37–47)
HGB BLD-MCNC: 12.6 G/DL — SIGNIFICANT CHANGE UP (ref 12–16)
IMM GRANULOCYTES NFR BLD AUTO: 0.3 % — SIGNIFICANT CHANGE UP (ref 0.1–0.3)
LYMPHOCYTES # BLD AUTO: 0.89 K/UL — LOW (ref 1.2–3.4)
LYMPHOCYTES # BLD AUTO: 15.2 % — LOW (ref 20.5–51.1)
MAGNESIUM SERPL-MCNC: 1.6 MG/DL — LOW (ref 1.8–2.4)
MCHC RBC-ENTMCNC: 29.2 PG — SIGNIFICANT CHANGE UP (ref 27–31)
MCHC RBC-ENTMCNC: 32.9 G/DL — SIGNIFICANT CHANGE UP (ref 32–37)
MCV RBC AUTO: 88.7 FL — SIGNIFICANT CHANGE UP (ref 81–99)
MONOCYTES # BLD AUTO: 0.78 K/UL — HIGH (ref 0.1–0.6)
MONOCYTES NFR BLD AUTO: 13.3 % — HIGH (ref 1.7–9.3)
NEUTROPHILS # BLD AUTO: 3.96 K/UL — SIGNIFICANT CHANGE UP (ref 1.4–6.5)
NEUTROPHILS NFR BLD AUTO: 67.5 % — SIGNIFICANT CHANGE UP (ref 42.2–75.2)
NRBC # BLD: 0 /100 WBCS — SIGNIFICANT CHANGE UP (ref 0–0)
PLATELET # BLD AUTO: 151 K/UL — SIGNIFICANT CHANGE UP (ref 130–400)
POTASSIUM SERPL-MCNC: 3.7 MMOL/L — SIGNIFICANT CHANGE UP (ref 3.5–5)
POTASSIUM SERPL-SCNC: 3.7 MMOL/L — SIGNIFICANT CHANGE UP (ref 3.5–5)
PROT SERPL-MCNC: 6.9 G/DL — SIGNIFICANT CHANGE UP (ref 6–8)
RBC # BLD: 4.32 M/UL — SIGNIFICANT CHANGE UP (ref 4.2–5.4)
RBC # FLD: 18.4 % — HIGH (ref 11.5–14.5)
SODIUM SERPL-SCNC: 140 MMOL/L — SIGNIFICANT CHANGE UP (ref 135–146)
SPECIMEN SOURCE: SIGNIFICANT CHANGE UP
VANCOMYCIN TROUGH SERPL-MCNC: 20.2 UG/ML — HIGH (ref 5–10)
WBC # BLD: 5.87 K/UL — SIGNIFICANT CHANGE UP (ref 4.8–10.8)
WBC # FLD AUTO: 5.87 K/UL — SIGNIFICANT CHANGE UP (ref 4.8–10.8)

## 2022-11-24 PROCEDURE — 99233 SBSQ HOSP IP/OBS HIGH 50: CPT

## 2022-11-24 PROCEDURE — 93925 LOWER EXTREMITY STUDY: CPT | Mod: 26

## 2022-11-24 RX ORDER — MAGNESIUM SULFATE 500 MG/ML
2 VIAL (ML) INJECTION
Refills: 0 | Status: COMPLETED | OUTPATIENT
Start: 2022-11-24 | End: 2022-11-24

## 2022-11-24 RX ORDER — VANCOMYCIN HCL 1 G
500 VIAL (EA) INTRAVENOUS EVERY 12 HOURS
Refills: 0 | Status: DISCONTINUED | OUTPATIENT
Start: 2022-11-24 | End: 2022-11-25

## 2022-11-24 RX ORDER — POTASSIUM CHLORIDE 20 MEQ
20 PACKET (EA) ORAL ONCE
Refills: 0 | Status: COMPLETED | OUTPATIENT
Start: 2022-11-24 | End: 2022-11-24

## 2022-11-24 RX ADMIN — OXYCODONE HYDROCHLORIDE 5 MILLIGRAM(S): 5 TABLET ORAL at 06:23

## 2022-11-24 RX ADMIN — Medication 20 MILLIEQUIVALENT(S): at 16:44

## 2022-11-24 RX ADMIN — Medication 2 UNIT(S): at 08:03

## 2022-11-24 RX ADMIN — Medication 25 GRAM(S): at 14:54

## 2022-11-24 RX ADMIN — PANTOPRAZOLE SODIUM 40 MILLIGRAM(S): 20 TABLET, DELAYED RELEASE ORAL at 06:23

## 2022-11-24 RX ADMIN — ZOLPIDEM TARTRATE 5 MILLIGRAM(S): 10 TABLET ORAL at 21:42

## 2022-11-24 RX ADMIN — DIVALPROEX SODIUM 250 MILLIGRAM(S): 500 TABLET, DELAYED RELEASE ORAL at 17:49

## 2022-11-24 RX ADMIN — ATORVASTATIN CALCIUM 80 MILLIGRAM(S): 80 TABLET, FILM COATED ORAL at 21:42

## 2022-11-24 RX ADMIN — OXYCODONE HYDROCHLORIDE 5 MILLIGRAM(S): 5 TABLET ORAL at 18:29

## 2022-11-24 RX ADMIN — SPIRONOLACTONE 50 MILLIGRAM(S): 25 TABLET, FILM COATED ORAL at 06:23

## 2022-11-24 RX ADMIN — Medication 81 MILLIGRAM(S): at 12:41

## 2022-11-24 RX ADMIN — Medication 40 MILLIGRAM(S): at 06:23

## 2022-11-24 RX ADMIN — SACUBITRIL AND VALSARTAN 1 TABLET(S): 24; 26 TABLET, FILM COATED ORAL at 06:22

## 2022-11-24 RX ADMIN — Medication 100 MILLIGRAM(S): at 06:23

## 2022-11-24 RX ADMIN — Medication 40 MILLIGRAM(S): at 14:54

## 2022-11-24 RX ADMIN — OXYCODONE HYDROCHLORIDE 5 MILLIGRAM(S): 5 TABLET ORAL at 17:59

## 2022-11-24 RX ADMIN — BUDESONIDE AND FORMOTEROL FUMARATE DIHYDRATE 2 PUFF(S): 160; 4.5 AEROSOL RESPIRATORY (INHALATION) at 08:05

## 2022-11-24 RX ADMIN — DIVALPROEX SODIUM 250 MILLIGRAM(S): 500 TABLET, DELAYED RELEASE ORAL at 06:22

## 2022-11-24 RX ADMIN — POLYETHYLENE GLYCOL 3350 17 GRAM(S): 17 POWDER, FOR SOLUTION ORAL at 12:41

## 2022-11-24 RX ADMIN — Medication 100 MILLIGRAM(S): at 06:22

## 2022-11-24 RX ADMIN — Medication 100 MILLIGRAM(S): at 18:45

## 2022-11-24 RX ADMIN — ENOXAPARIN SODIUM 40 MILLIGRAM(S): 100 INJECTION SUBCUTANEOUS at 17:50

## 2022-11-24 RX ADMIN — BUDESONIDE AND FORMOTEROL FUMARATE DIHYDRATE 2 PUFF(S): 160; 4.5 AEROSOL RESPIRATORY (INHALATION) at 20:38

## 2022-11-24 RX ADMIN — Medication 25 GRAM(S): at 16:02

## 2022-11-24 RX ADMIN — CEFTRIAXONE 100 MILLIGRAM(S): 500 INJECTION, POWDER, FOR SOLUTION INTRAMUSCULAR; INTRAVENOUS at 12:42

## 2022-11-24 RX ADMIN — SACUBITRIL AND VALSARTAN 1 TABLET(S): 24; 26 TABLET, FILM COATED ORAL at 17:50

## 2022-11-24 RX ADMIN — Medication 2 UNIT(S): at 16:43

## 2022-11-24 NOTE — PROGRESS NOTE ADULT - SUBJECTIVE AND OBJECTIVE BOX
OPERATIVE PROCEDURE(s):  ICD extraction              POD #    1                   SURGEON(s): ARIANNA Snider    HPI: 57y Female with h/o COPD on 3L home O2, CORNELIUS on CPAP, CVA with residual LLE weakness, DM, HTN, HLD, CAD, s/p PCI, pulmonary HTN, HFrEF (15-20%) was seen in 4/22 with recommendation for ICD as EF did not improved despite medical therapy, Patient did not want to wait for the procedure and left, did not follow up as out-patient. On 5/25/22 patient had BiV ICD placed by Dr Juárez in [facility].   Patient admitted today with swelling and drainage from the device site. Reports symptoms including purulent looking discharge started >2 months ago, she admits on scratching on. CTS consulted for explantation    SUBJECTIVE ASSESSMENT: 57y Female patient seen and examined at bedside.    Vital Signs Last 24 Hrs  T(F): 98 (24 Nov 2022 13:35), Max: 98.2 (24 Nov 2022 04:27)  HR: 73 (24 Nov 2022 13:35) (72 - 82)  BP: 182/83 (24 Nov 2022 13:35) (147/77 - 182/83)  RR: 18 (24 Nov 2022 13:35) (18 - 18)  SpO2: 92% (24 Nov 2022 07:30) (90% - 94%)    I&O's Detail    23 Nov 2022 07:01  -  24 Nov 2022 07:00  --------------------------------------------------------  IN:    IV PiggyBack: 50 mL    IV PiggyBack: 100 mL    Oral Fluid: 350 mL  Total IN: 500 mL    OUT:    Bulb (mL): 20 mL    Voided (mL): 3000 mL  Total OUT: 3020 mL    Net: I&O's Detail    22 Nov 2022 07:01  -  23 Nov 2022 07:00  --------------------------------------------------------  Total NET: -3766 mL    23 Nov 2022 07:01  -  24 Nov 2022 07:00  --------------------------------------------------------  Total NET: -2520 mL      CAPILLARY BLOOD GLUCOSE  POCT Blood Glucose.: 95 mg/dL (24 Nov 2022 11:29)  POCT Blood Glucose.: 102 mg/dL (24 Nov 2022 07:27)  POCT Blood Glucose.: 113 mg/dL (23 Nov 2022 20:42)  POCT Blood Glucose.: 94 mg/dL (23 Nov 2022 16:21)  A1C with Estimated Average Glucose Result: 5.6 % (11-19-22 @ 22:09)      Physical Exam:  General: NAD; A&Ox3  Cardiac: S1/S2, RRR, no murmur, no rubs  Lungs: unlabored respirations, CTA b/l, no wheeze, no rales, no crackles  Abdomen: Soft/NT/ND; positive bowel sounds x 4  Incisions: Incisions clean/dry/intact  Extremities: No edema b/l lower extremities; good capillary refill; no cyanosis; palpable 1+ pedal pulses b/l        LABS:                        12.6   5.87  )-----------( 151      ( 24 Nov 2022 12:29 )             38.3                         13.3   6.62  )-----------( 176      ( 22 Nov 2022 22:00 )             42.2     11-24    140  |  91<L>  |  18  ----------------------------<  94  3.7   |  36<H>  |  1.2  11-22    141  |  94<L>  |  17  ----------------------------<  128<H>  4.1   |  31  |  1.2    Ca    9.5      24 Nov 2022 12:29  Mg     1.6     11-24    TPro  6.9 [6.0 - 8.0]  /  Alb  3.9 [3.5 - 5.2]  /  TBili  1.4<H> [0.2 - 1.2]  /  DBili  x   /  AST  19 [0 - 41]  /  ALT  8 [0 - 41]  /  AlkPhos  102 [30 - 115]  11-24    PT/INR - ( 22 Nov 2022 22:00 )   PT: ;   INR: 1.26 ratio         PTT - ( 22 Nov 2022 22:00 )  PTT:37.7 sec      Allergies  No Known Allergies  Intolerances      MEDICATIONS  (STANDING):  aspirin enteric coated 81 milliGRAM(s) Oral daily  atorvastatin 80 milliGRAM(s) Oral at bedtime  budesonide 160 MICROgram(s)/formoterol 4.5 MICROgram(s) Inhaler 2 Puff(s) Inhalation two times a day  cefTRIAXone   IVPB 2000 milliGRAM(s) IV Intermittent every 24 hours  divalproex  milliGRAM(s) Oral two times a day  enoxaparin Injectable 40 milliGRAM(s) SubCutaneous every 24 hours  furosemide   Injectable 40 milliGRAM(s) IV Push two times a day  influenza   Vaccine 0.5 milliLiter(s) IntraMuscular once  insulin lispro (ADMELOG) corrective regimen sliding scale   SubCutaneous three times a day before meals  insulin lispro Injectable (ADMELOG) 2 Unit(s) SubCutaneous three times a day before meals  magnesium sulfate  IVPB 2 Gram(s) IV Intermittent every 2 hours  metoprolol succinate  milliGRAM(s) Oral daily  pantoprazole    Tablet 40 milliGRAM(s) Oral before breakfast  polyethylene glycol 3350 17 Gram(s) Oral daily  potassium chloride   Powder 20 milliEquivalent(s) Oral once  sacubitril 49 mG/valsartan 51 mG 1 Tablet(s) Oral two times a day  spironolactone 50 milliGRAM(s) Oral daily  zolpidem 5 milliGRAM(s) Oral at bedtime    MEDICATIONS  (PRN):  acetaminophen     Tablet .. 650 milliGRAM(s) Oral every 6 hours PRN Temp greater or equal to 38C (100.4F), Mild Pain (1 - 3)  albuterol    90 MICROgram(s) HFA Inhaler 2 Puff(s) Inhalation every 6 hours PRN Bronchospasm  loratadine 10 milliGRAM(s) Oral daily PRN itchiness  oxyCODONE    IR 5 milliGRAM(s) Oral every 6 hours PRN Moderate Pain (4 - 6)    Home Medications:  atorvastatin 80 mg oral tablet: 1 tab(s) orally once a day (at bedtime) (16 Feb 2022 13:50)  fenofibrate 145 mg oral tablet: 1 tab(s) orally once a day (16 Feb 2022 13:50)  magnesium oxide 400 mg oral tablet: 1 tab(s) orally 3 times a day (with meals) (14 Jan 2022 12:37)  metoprolol succinate 100 mg oral tablet, extended release: 1 tab(s) orally once a day (19 Nov 2022 11:09)  pantoprazole 40 mg oral delayed release tablet: 1 tab(s) orally once a day (before a meal) (16 Nov 2021 12:10)  Synthroid 25 mcg (0.025 mg) oral tablet: 1 tab(s) orally once a day (19 Nov 2022 11:09)      Assessment/Plan:  57y Female status-post  ICD extraction              POD #    1    - Case and plan discussed with CTU Intensivist and CT Surgeon - Dr. Snider  - Continue CTU supportive care and ongoing plan of care as per continuing CTU rounds.   - Continue DVT/GI prophylaxis  - Incentive Spirometry 10 times an hour  - Continue to advance physical activity as tolerated and continue PT/OT as directed  1. ID: f/u cultures and cont ABX.  2. EP: Followed by Dr. Juárez; inpt EP recommending life vest.   3. CTS: s/p explant of ICD; incision clean and dry w/DON in place. Low output but incision with surrounding induration may continue to drain. Keep drain 1 more day.

## 2022-11-24 NOTE — PROGRESS NOTE ADULT - ASSESSMENT
57 year old female with PMH of COPD on 4 L home O2, CORNELIUS on CPAP, HFrEF (19% on TTE 02/2022, s/p ICD, pulmonary HTN, CAD s/p PCI to RCA, HTN, HLD, CVA with residual LLE weakness, DM, active smoker on home O2, presented to the ED for SOB and LE and UE edema      # Cellulitis, infection of left sided AICD pocket site  # Bacteremia, blood culture growing Strep spp. and Coag negative Staph  - On Vancomycin (level is high 31 , Vanco held), consider resuming at 500mg BID if level is below 25  - Was on Cefepime, changed to ceftriaxone on 11/22  - follow up blood culture (Nov 19 and Nov 21 so far negative)  - S/p explantation on 11/23  - YOKASTA cancelled on 11/21 due to increased work of breathing, cardio not planning to attempt YOKASTA at the moment because patient is at high risk of getting intubated as per the anesthesia team, pt will likely get extended IV abx therapy. Wound cultures from explantation were sent.    # Acute HFrEF exacerbation   - previous TTE March 2022: 30-35% EF, moderate MR and TR  - On Lasix 40mg IV BID  - on Entresto and spironolactone  - on Toprol 100 mg daily  - PT saw her -> recommended STR    #LUE swelling  - Duplex Arterial -> No high grade stenosis  - Duplex Venous -> No DVT    # Bilateral LE swelling  - likely 2/2 CHF  - Negative LE duplex    # Hypomagnesemia  - Replete as needed    # Chronic hypoxemic respiratory failure 2/2 COPD on 4 L at baseline   # CORNELIUS on CPAP   - CPAP ordered based on prior setting, PEEP 8 and FiO2 40%, but patient no compliant  - c/w Symbicort and Albuterol prn  - c/w oxygen supplementation    # CAD   - s/p PCI+stent to RCA last cath in hospital June 2021  - c/w aspirin and statin    # Elevated troponin  - likely from demand of CHF exacerbation     # Hx of CVA with residual LE weakness  - currently wheelchair bound   - c/w aspirin, statin     # DM II  - Low fasting sugar, Lantus held  - On sliding scale and standing pre-meals    # Hypothyroidism   - c/w levothyroxine    # CKD stage 2, stable   -  monitor BMP    # Suspected seizure disorder vs. mood disorder  - divalproex picked up on sure scripts   - c/w divalproex 250 mg BID    # DVT ppx: Lovenox resumed  # GI ppx: Pantoprazole  # Code status - Full Code  Handoff: s/p explant of ICD, On IV lasix, YOKASTA not probable at this time

## 2022-11-24 NOTE — PROGRESS NOTE ADULT - ASSESSMENT
56 yo F PMHx COPD on 4 L home O2, CORNELIUS on CPAP, chronic HFrEF (19% on TTE 02/2022, life vest at home), pulmonary HTN, CAD s/p PCI to RCA, HTN, HLD, CVA with residual LLE weakness, DM II, active smoker on home O2, presented to the ED for SOB and LE and UE edema. Pt found to have CHF exacerbation as well as cellulitis.     A/P:   Acute on chronic HFrEF exacerbation:    SOB, leg edema. Pro-BNP 26K (baseline 3-4K).   Echo 3/2022: 30-35% EF, moderate MR and TR  Continue Bumex IV.   Continue Entresto, spironolactone, metoprolol  BiPAP prn.    Left sided AICD cellulitis pocket abscess:   Bacteremia: coag negative staph,   Left Upper Extremity cellulitis:   s/p AICD extraction 11/23/22.   On Rocephin and Vancomycin. Vancomycin level is high, resume with lower dose.   Pending YOKASTA.  ID follow up.     Chronic hypoxemic respiratory failure 2/2 COPD on 4 L at baseline   CORNELIUS on CPAP   CPAP ordered based on prior setting, PEEP 8 and FiO2 40%  Continue Symbicort and albuterol prn, continue nasal canula.     CAD s/p PCI  stent to RCA last cath in hospital 6/21/2021  Continue Aspirin, Metoprolol and statin    History of CVA   Residual left side weakness  Continue ASA and Lipitor.      DM II  FS goal 110-180, A1C 5.6     Hypothyroidism:  levothyroxine, check TSH    CKD III: Stable     Suspected mood disorder  Continue divalproex 250 mg BID    DVT prophylaxis  GI prophylaxis - on protonix  Code status - Full Code    #Progress Note Handoff:  Pending (specify): OYKASTA, ID follow up,   Family discussion:  Disposition: Home.

## 2022-11-24 NOTE — PROGRESS NOTE ADULT - SUBJECTIVE AND OBJECTIVE BOX
INTERVAL HPI/OVERNIGHT EVENTS:  s/p ICD extraction  POD1  c/o pain at the site    MEDICATIONS  (STANDING):  aspirin enteric coated 81 milliGRAM(s) Oral daily  atorvastatin 80 milliGRAM(s) Oral at bedtime  budesonide 160 MICROgram(s)/formoterol 4.5 MICROgram(s) Inhaler 2 Puff(s) Inhalation two times a day  cefTRIAXone   IVPB 2000 milliGRAM(s) IV Intermittent every 24 hours  divalproex  milliGRAM(s) Oral two times a day  enoxaparin Injectable 40 milliGRAM(s) SubCutaneous every 24 hours  furosemide   Injectable 40 milliGRAM(s) IV Push two times a day  influenza   Vaccine 0.5 milliLiter(s) IntraMuscular once  insulin lispro (ADMELOG) corrective regimen sliding scale   SubCutaneous three times a day before meals  insulin lispro Injectable (ADMELOG) 2 Unit(s) SubCutaneous three times a day before meals  magnesium sulfate  IVPB 2 Gram(s) IV Intermittent every 2 hours  metoprolol succinate  milliGRAM(s) Oral daily  pantoprazole    Tablet 40 milliGRAM(s) Oral before breakfast  polyethylene glycol 3350 17 Gram(s) Oral daily  potassium chloride   Powder 20 milliEquivalent(s) Oral once  sacubitril 49 mG/valsartan 51 mG 1 Tablet(s) Oral two times a day  spironolactone 50 milliGRAM(s) Oral daily  zolpidem 5 milliGRAM(s) Oral at bedtime    MEDICATIONS  (PRN):  acetaminophen     Tablet .. 650 milliGRAM(s) Oral every 6 hours PRN Temp greater or equal to 38C (100.4F), Mild Pain (1 - 3)  albuterol    90 MICROgram(s) HFA Inhaler 2 Puff(s) Inhalation every 6 hours PRN Bronchospasm  loratadine 10 milliGRAM(s) Oral daily PRN itchiness  oxyCODONE    IR 5 milliGRAM(s) Oral every 6 hours PRN Moderate Pain (4 - 6)      Allergies    No Known Allergies    Intolerances        REVIEW OF SYSTEMS    [x ] A ten-point review of systems was otherwise negative except as noted.  [ ] Due to altered mental status/intubation, subjective information were not able to be obtained from the patient. History was obtained, to the extent possible, from review of the chart and collateral sources of information.      Vital Signs Last 24 Hrs  T(C): 36.7 (24 Nov 2022 13:35), Max: 36.8 (24 Nov 2022 04:27)  T(F): 98 (24 Nov 2022 13:35), Max: 98.2 (24 Nov 2022 04:27)  HR: 73 (24 Nov 2022 13:35) (72 - 89)  BP: 182/83 (24 Nov 2022 13:35) (143/90 - 182/83)  BP(mean): --  RR: 18 (24 Nov 2022 13:35) (18 - 18)  SpO2: 92% (24 Nov 2022 07:30) (90% - 94%)    Parameters below as of 24 Nov 2022 07:30  Patient On (Oxygen Delivery Method): nasal cannula  O2 Flow (L/min): 4      11-23-22 @ 07:01  -  11-24-22 @ 07:00  --------------------------------------------------------  IN: 500 mL / OUT: 3020 mL / NET: -2520 mL    11-24-22 @ 07:01  -  11-24-22 @ 13:42  --------------------------------------------------------  IN: 270 mL / OUT: 1457 mL / NET: -1187 mL        PHYSICAL EXAM:    GENERAL: In no apparent distress, well nourished, and hydrated.  HEART: Regular rate and rhythm; No murmur; NO rubs, or gallops.  Left CW dressing c/d/i, pressure dressing in place DON drain with serosanguinous fluid , no hematoma  PULMONARY: Clear to auscultation and percussion.  Normal expansion/effort. No rales, wheezing, or rhonchi bilaterally.  ABDOMEN: Soft, Nontender, Nondistended; Bowel sounds present  EXTREMITIES:  Extremities warm, pink, well-perfused, 2+ Peripheral Pulses, No clubbing, cyanosis, or edema  NEUROLOGICAL: alert & oriented x 3, no focal deficits, PERRLA, EOMI    LABS:                        12.6   5.87  )-----------( 151      ( 24 Nov 2022 12:29 )             38.3     11-24    140  |  91<L>  |  18  ----------------------------<  94  3.7   |  36<H>  |  1.2    Ca    9.5      24 Nov 2022 12:29  Mg     1.6     11-24    TPro  6.9  /  Alb  3.9  /  TBili  1.4<H>  /  DBili  x   /  AST  19  /  ALT  8   /  AlkPhos  102  11-24    PT/INR - ( 22 Nov 2022 22:00 )   PT: 14.50 sec;   INR: 1.26 ratio         PTT - ( 22 Nov 2022 22:00 )  PTT:37.7 sec    COVID-19 PCR: NotDetec (11-22-22 @ 15:30)      Culture - Blood (11.22.22 @ 21:51)   Specimen Source: .Blood Blood   Culture Results:   No growth to date.   Culture - Blood (11.19.22 @ 22:11)   Specimen Source: .Blood Blood   Culture Results:   No growth to date.   Culture - Blood (11.19.22 @ 04:59)   - Coagulase negative Staphylococcus: Detec   - Streptococcus sp. (Not Grp A, B or S pneumoniae): Detec   Gram Stain:   Growth in anaerobic bottle: Gram positive cocci in pairs   Specimen Source: .Blood Blood-Peripheral   Organism: Blood Culture PCR   Culture Results:   Growth in anaerobic bottle: Staphylococcus haemolyticus Coag Negative   Staphylococcus   Single set isolate, possible contaminant. Contact   Microbiology if susceptibility testing clinically   indicated.   Growth in anaerobic bottle: Streptococcus salivarius/vestibularis group   Growth in anaerobic bottle: Streptococcus mitis/oralis group   Alpha hemolytic strep   (not Strep. pneumoniae or Enterococcus)   Single set isolate, possible contaminant. Contact   Microbiology if susceptibility testing clinically   indicated.           Tele PVC, short NSVT    RADIOLOGY & ADDITIONAL TESTS:  < from: Xray Chest 1 View-PORTABLE IMMEDIATE (11.23.22 @ 11:08) >  Findings:    Support devices: None.    Cardiac/mediastinum/hilum: Cardiomegaly    Lung parenchyma/Pleura: Bilateral opacities    Skeleton/soft tissues: Unremarkable.    Impression:    Cardiomegaly. CHF.    Worsening.    < end of copied text >         normal balance

## 2022-11-24 NOTE — PROGRESS NOTE ADULT - ASSESSMENT
PMD Dr Charles  EP Dr Taylor    57y Female with h/o COPD on 3L home O2, CORNELIUS on CPAP, CVA with residual LLE weakness, DM, HTN, HLD, CAD, s/p PCI, pulmonary HTN, HFrEF, s/p BiV ICD [employer] 5/25/22, admitted with ICD site infection   s/p extraction 11/23  admission culture positive for coag negative staph and strep species, other cultures negative to date  pocket swabs cultures sent, penidng    ICD site infection  ICMP  HFrEF  CAD, PCI  COPD on 3L  HTN, HLD, DM    con't tele  follow up cultures  follow up with ID regarding length of antibiotic therapies  Maintain electrolytes K>4.0 Mg >2.0   considering increasing beta blockers as BP permits due to persistent ectopy on tele  wearable defibrillator upon discharge until re-implantation   Arash heller

## 2022-11-24 NOTE — PROGRESS NOTE ADULT - SUBJECTIVE AND OBJECTIVE BOX
24H events:    Patient is a 57y old Female who presents with a chief complaint of CHrEF Exacerbation, and  suspected Cellulitis from [facility] site. (20 Nov 2022 13:30)    Primary diagnosis of CHF exacerbation       Today is hospital day 5d. This morning patient was seen and examined at bedside, resting comfortably in bed.      PAST MEDICAL & SURGICAL HISTORY  Hypertension    Hyperlipidemia    Anxiety and depression    COPD, severe    CHF (congestive heart failure)    Cerebrovascular accident (CVA)  Multiple    Type 2 diabetes mellitus    CKD (chronic kidney disease), stage II    No significant past surgical history      SOCIAL HISTORY:  Social History:      ALLERGIES:  No Known Allergies    MEDICATIONS:  STANDING MEDICATIONS  aspirin enteric coated 81 milliGRAM(s) Oral daily  atorvastatin 80 milliGRAM(s) Oral at bedtime  budesonide 160 MICROgram(s)/formoterol 4.5 MICROgram(s) Inhaler 2 Puff(s) Inhalation two times a day  cefTRIAXone   IVPB 2000 milliGRAM(s) IV Intermittent every 24 hours  divalproex  milliGRAM(s) Oral two times a day  enoxaparin Injectable 40 milliGRAM(s) SubCutaneous every 24 hours  furosemide   Injectable 40 milliGRAM(s) IV Push two times a day  influenza   Vaccine 0.5 milliLiter(s) IntraMuscular once  insulin lispro (ADMELOG) corrective regimen sliding scale   SubCutaneous three times a day before meals  insulin lispro Injectable (ADMELOG) 2 Unit(s) SubCutaneous three times a day before meals  metoprolol succinate  milliGRAM(s) Oral daily  pantoprazole    Tablet 40 milliGRAM(s) Oral before breakfast  polyethylene glycol 3350 17 Gram(s) Oral daily  sacubitril 49 mG/valsartan 51 mG 1 Tablet(s) Oral two times a day  spironolactone 50 milliGRAM(s) Oral daily  zolpidem 5 milliGRAM(s) Oral at bedtime    PRN MEDICATIONS  acetaminophen     Tablet .. 650 milliGRAM(s) Oral every 6 hours PRN  albuterol    90 MICROgram(s) HFA Inhaler 2 Puff(s) Inhalation every 6 hours PRN  loratadine 10 milliGRAM(s) Oral daily PRN  oxyCODONE    IR 5 milliGRAM(s) Oral every 6 hours PRN        LABS:                        13.3   6.62  )-----------( 176      ( 22 Nov 2022 22:00 )             42.2     11-22    141  |  94<L>  |  17  ----------------------------<  128<H>  4.1   |  31  |  1.2    Ca    9.3      22 Nov 2022 22:00  Mg     2.0     11-22    TPro  7.4  /  Alb  3.9  /  TBili  1.4<H>  /  DBili  x   /  AST  21  /  ALT  11  /  AlkPhos  110  11-22    PT/INR - ( 22 Nov 2022 22:00 )   PT: 14.50 sec;   INR: 1.26 ratio         PTT - ( 22 Nov 2022 22:00 )  PTT:37.7 sec          Culture - Blood (collected 22 Nov 2022 21:51)  Source: .Blood Blood  Preliminary Report (24 Nov 2022 03:01):    No growth to date.    Culture - Blood (collected 22 Nov 2022 05:21)  Source: .Blood None  Preliminary Report (23 Nov 2022 15:06):    No growth to date.          RADIOLOGY:    VITALS:   T(F): 98.2  HR: 82  BP: 147/77  RR: 18  SpO2: 92%    PHYSICAL EXAM:    GENERAL: NAD, well-groomed, well-developed  HEAD:  Atraumatic, Normocephalic  EYES: EOMI  NECK: Supple  NERVOUS SYSTEM:  Alert & Oriented X3, motor 5/5 b/l upper and lower ext, sensation intact  CHEST/LUNG: Poor inspiratory effort, Wound site clean, wound drain in place  HEART: Regular rate and rhythm; No murmurs, rubs, or gallops  ABDOMEN: Soft, Nontender, Nondistended; Bowel sounds present  EXTREMITIES:  No clubbing, cyanosis, or edema  LYMPH: No lymphadenopathy noted  SKIN: No rashes or lesions

## 2022-11-24 NOTE — PROGRESS NOTE ADULT - SUBJECTIVE AND OBJECTIVE BOX
USMAN WILLIAN  57y  Female      Patient is a 57y old  Female who presents with a chief complaint of CHrEF Exacerbation, and  suspected Cellulitis from [facility] site. (20 Nov 2022 13:30)      INTERVAL HPI/OVERNIGHT EVENTS:  She is sleepy, still with left arm pain. No fever.   Vital Signs Last 24 Hrs  T(C): 36.8 (24 Nov 2022 04:27), Max: 36.8 (24 Nov 2022 04:27)  T(F): 98.2 (24 Nov 2022 04:27), Max: 98.2 (24 Nov 2022 04:27)  HR: 82 (24 Nov 2022 04:27) (72 - 89)  BP: 147/77 (24 Nov 2022 04:27) (143/90 - 160/79)  BP(mean): --  RR: 18 (24 Nov 2022 04:27) (18 - 18)  SpO2: 92% (24 Nov 2022 07:30) (90% - 94%)    Parameters below as of 24 Nov 2022 07:30  Patient On (Oxygen Delivery Method): nasal cannula  O2 Flow (L/min): 4        11-23-22 @ 07:01  -  11-24-22 @ 07:00  --------------------------------------------------------  IN: 500 mL / OUT: 3020 mL / NET: -2520 mL    11-24-22 @ 07:01  -  11-24-22 @ 13:27  --------------------------------------------------------  IN: 0 mL / OUT: 807 mL / NET: -807 mL            Consultant(s) Notes Reviewed:  [x ] YES  [ ] NO          MEDICATIONS  (STANDING):  aspirin enteric coated 81 milliGRAM(s) Oral daily  atorvastatin 80 milliGRAM(s) Oral at bedtime  budesonide 160 MICROgram(s)/formoterol 4.5 MICROgram(s) Inhaler 2 Puff(s) Inhalation two times a day  cefTRIAXone   IVPB 2000 milliGRAM(s) IV Intermittent every 24 hours  divalproex  milliGRAM(s) Oral two times a day  enoxaparin Injectable 40 milliGRAM(s) SubCutaneous every 24 hours  furosemide   Injectable 40 milliGRAM(s) IV Push two times a day  influenza   Vaccine 0.5 milliLiter(s) IntraMuscular once  insulin lispro (ADMELOG) corrective regimen sliding scale   SubCutaneous three times a day before meals  insulin lispro Injectable (ADMELOG) 2 Unit(s) SubCutaneous three times a day before meals  magnesium sulfate  IVPB 2 Gram(s) IV Intermittent every 2 hours  metoprolol succinate  milliGRAM(s) Oral daily  pantoprazole    Tablet 40 milliGRAM(s) Oral before breakfast  polyethylene glycol 3350 17 Gram(s) Oral daily  potassium chloride   Powder 20 milliEquivalent(s) Oral once  sacubitril 49 mG/valsartan 51 mG 1 Tablet(s) Oral two times a day  spironolactone 50 milliGRAM(s) Oral daily  zolpidem 5 milliGRAM(s) Oral at bedtime    MEDICATIONS  (PRN):  acetaminophen     Tablet .. 650 milliGRAM(s) Oral every 6 hours PRN Temp greater or equal to 38C (100.4F), Mild Pain (1 - 3)  albuterol    90 MICROgram(s) HFA Inhaler 2 Puff(s) Inhalation every 6 hours PRN Bronchospasm  loratadine 10 milliGRAM(s) Oral daily PRN itchiness  oxyCODONE    IR 5 milliGRAM(s) Oral every 6 hours PRN Moderate Pain (4 - 6)      LABS                          12.6   5.87  )-----------( 151      ( 24 Nov 2022 12:29 )             38.3     11-24    140  |  91<L>  |  18  ----------------------------<  94  3.7   |  36<H>  |  1.2    Ca    9.5      24 Nov 2022 12:29  Mg     1.6     11-24    TPro  6.9  /  Alb  3.9  /  TBili  1.4<H>  /  DBili  x   /  AST  19  /  ALT  8   /  AlkPhos  102  11-24        PT/INR - ( 22 Nov 2022 22:00 )   PT: 14.50 sec;   INR: 1.26 ratio         PTT - ( 22 Nov 2022 22:00 )  PTT:37.7 sec  Lactate Trend        CAPILLARY BLOOD GLUCOSE      POCT Blood Glucose.: 95 mg/dL (24 Nov 2022 11:29)      Culture - Acid Fast - Other w/Smear (collected 11-23-22 @ 10:02)  Source: .Other None    Culture - Blood (collected 11-22-22 @ 21:51)  Source: .Blood Blood  Preliminary Report (11-24-22 @ 03:01):    No growth to date.    Culture - Blood (collected 11-22-22 @ 05:21)  Source: .Blood None  Preliminary Report (11-23-22 @ 15:06):    No growth to date.    Culture - Blood (collected 11-21-22 @ 07:30)  Source: .Blood Blood-Peripheral  Preliminary Report (11-22-22 @ 23:01):    No growth to date.    Culture - Blood (collected 11-19-22 @ 22:11)  Source: .Blood Blood  Preliminary Report (11-21-22 @ 07:02):    No growth to date.    Culture - Blood (collected 11-19-22 @ 04:59)  Source: .Blood Blood-Peripheral  Gram Stain (11-20-22 @ 06:53):    Growth in anaerobic bottle: Gram positive cocci in pairs  Final Report (11-21-22 @ 12:08):    Growth in anaerobic bottle: Staphylococcus haemolyticus Coag Negative    Staphylococcus    Single set isolate, possible contaminant. Contact    Microbiology if susceptibility testing clinically    indicated.    Growth in anaerobic bottle: Streptococcus salivarius/vestibularis group    Growth in anaerobic bottle: Streptococcus mitis/oralis group    Alpha hemolytic strep    (not Strep. pneumoniae or Enterococcus)    Single set isolate, possible contaminant. Contact    Microbiology if susceptibility testing clinically    indicated.    ***Blood Panel PCR results on this specimen are available    approximately 3 hours after the Gram stain result.***    Gram stain, PCR, and/or culture results may not always    correspond due to difference in methodologies.    ************************************************************    This PCR assay was performed by multiplex PCR. This    Assay tests for 66 bacterial and resistance gene targets.    Please refer to the [facility] test directory    at [email or web address] for details.  Organism: Blood Culture PCR (11-21-22 @ 12:08)  Organism: Blood Culture PCR (11-21-22 @ 12:08)      -  Coagulase negative Staphylococcus: Detec      -  Streptococcus sp. (Not Grp A, B or S pneumoniae): Detec      Method Type: PCR    Culture - Blood (collected 11-19-22 @ 04:59)  Source: .Blood Blood-Peripheral  Final Report (11-24-22 @ 13:00):    No Growth Final        RADIOLOGY & ADDITIONAL TESTS:    Imaging Personally Reviewed:  [ ] YES  [ ] NO    HEALTH ISSUES - PROBLEM Dx:          PHYSICAL EXAM:  GENERAL: NAD, well-developed.  HEAD:  Atraumatic, Normocephalic.  EYES: EOMI, PERRLA, conjunctiva and sclera clear.  NECK: Supple, No JVD.  CHEST/LUNG: bilateral ronchi  HEART: Regular rate and rhythm; S1 S2.   ABDOMEN: Soft, Nontender, Nondistended; Bowel sounds present.  EXTREMITIES:  2+ Peripheral Pulses, No clubbing, cyanosis, or edema.  PSYCH: AAOx3.  NEUROLOGY: chronic left side weakness.   SKIN: No rashes or lesions.

## 2022-11-25 ENCOUNTER — TRANSCRIPTION ENCOUNTER (OUTPATIENT)
Age: 57
End: 2022-11-25

## 2022-11-25 LAB
ALBUMIN SERPL ELPH-MCNC: 3.4 G/DL — LOW (ref 3.5–5.2)
ALP SERPL-CCNC: 96 U/L — SIGNIFICANT CHANGE UP (ref 30–115)
ALT FLD-CCNC: 6 U/L — SIGNIFICANT CHANGE UP (ref 0–41)
ANION GAP SERPL CALC-SCNC: 10 MMOL/L — SIGNIFICANT CHANGE UP (ref 7–14)
ANION GAP SERPL CALC-SCNC: 10 MMOL/L — SIGNIFICANT CHANGE UP (ref 7–14)
AST SERPL-CCNC: 17 U/L — SIGNIFICANT CHANGE UP (ref 0–41)
BASOPHILS # BLD AUTO: 0.07 K/UL — SIGNIFICANT CHANGE UP (ref 0–0.2)
BASOPHILS NFR BLD AUTO: 1.3 % — HIGH (ref 0–1)
BILIRUB SERPL-MCNC: 1 MG/DL — SIGNIFICANT CHANGE UP (ref 0.2–1.2)
BUN SERPL-MCNC: 19 MG/DL — SIGNIFICANT CHANGE UP (ref 10–20)
BUN SERPL-MCNC: 19 MG/DL — SIGNIFICANT CHANGE UP (ref 10–20)
CALCIUM SERPL-MCNC: 8.7 MG/DL — SIGNIFICANT CHANGE UP (ref 8.4–10.5)
CALCIUM SERPL-MCNC: 9 MG/DL — SIGNIFICANT CHANGE UP (ref 8.4–10.4)
CHLORIDE SERPL-SCNC: 92 MMOL/L — LOW (ref 98–110)
CHLORIDE SERPL-SCNC: 93 MMOL/L — LOW (ref 98–110)
CO2 SERPL-SCNC: 37 MMOL/L — HIGH (ref 17–32)
CO2 SERPL-SCNC: 39 MMOL/L — HIGH (ref 17–32)
CREAT SERPL-MCNC: 1.3 MG/DL — SIGNIFICANT CHANGE UP (ref 0.7–1.5)
CREAT SERPL-MCNC: 1.3 MG/DL — SIGNIFICANT CHANGE UP (ref 0.7–1.5)
CULTURE RESULTS: SIGNIFICANT CHANGE UP
EGFR: 49 ML/MIN/1.73M2 — LOW
EGFR: 49 ML/MIN/1.73M2 — LOW
EOSINOPHIL # BLD AUTO: 0.19 K/UL — SIGNIFICANT CHANGE UP (ref 0–0.7)
EOSINOPHIL NFR BLD AUTO: 3.4 % — SIGNIFICANT CHANGE UP (ref 0–8)
GLUCOSE BLDC GLUCOMTR-MCNC: 115 MG/DL — HIGH (ref 70–99)
GLUCOSE BLDC GLUCOMTR-MCNC: 121 MG/DL — HIGH (ref 70–99)
GLUCOSE BLDC GLUCOMTR-MCNC: 131 MG/DL — HIGH (ref 70–99)
GLUCOSE BLDC GLUCOMTR-MCNC: 82 MG/DL — SIGNIFICANT CHANGE UP (ref 70–99)
GLUCOSE SERPL-MCNC: 123 MG/DL — HIGH (ref 70–99)
GLUCOSE SERPL-MCNC: 124 MG/DL — HIGH (ref 70–99)
HCT VFR BLD CALC: 33.8 % — LOW (ref 37–47)
HGB BLD-MCNC: 11 G/DL — LOW (ref 12–16)
IMM GRANULOCYTES NFR BLD AUTO: 0.4 % — HIGH (ref 0.1–0.3)
LYMPHOCYTES # BLD AUTO: 0.9 K/UL — LOW (ref 1.2–3.4)
LYMPHOCYTES # BLD AUTO: 16.3 % — LOW (ref 20.5–51.1)
MAGNESIUM SERPL-MCNC: 1.8 MG/DL — SIGNIFICANT CHANGE UP (ref 1.8–2.4)
MAGNESIUM SERPL-MCNC: 2 MG/DL — SIGNIFICANT CHANGE UP (ref 1.8–2.4)
MCHC RBC-ENTMCNC: 29.3 PG — SIGNIFICANT CHANGE UP (ref 27–31)
MCHC RBC-ENTMCNC: 32.5 G/DL — SIGNIFICANT CHANGE UP (ref 32–37)
MCV RBC AUTO: 90.1 FL — SIGNIFICANT CHANGE UP (ref 81–99)
MONOCYTES # BLD AUTO: 0.79 K/UL — HIGH (ref 0.1–0.6)
MONOCYTES NFR BLD AUTO: 14.3 % — HIGH (ref 1.7–9.3)
NEUTROPHILS # BLD AUTO: 3.55 K/UL — SIGNIFICANT CHANGE UP (ref 1.4–6.5)
NEUTROPHILS NFR BLD AUTO: 64.3 % — SIGNIFICANT CHANGE UP (ref 42.2–75.2)
NRBC # BLD: 0 /100 WBCS — SIGNIFICANT CHANGE UP (ref 0–0)
PLATELET # BLD AUTO: 141 K/UL — SIGNIFICANT CHANGE UP (ref 130–400)
POTASSIUM SERPL-MCNC: 3.4 MMOL/L — LOW (ref 3.5–5)
POTASSIUM SERPL-MCNC: 3.8 MMOL/L — SIGNIFICANT CHANGE UP (ref 3.5–5)
POTASSIUM SERPL-SCNC: 3.4 MMOL/L — LOW (ref 3.5–5)
POTASSIUM SERPL-SCNC: 3.8 MMOL/L — SIGNIFICANT CHANGE UP (ref 3.5–5)
PROT SERPL-MCNC: 5.9 G/DL — LOW (ref 6–8)
RBC # BLD: 3.75 M/UL — LOW (ref 4.2–5.4)
RBC # FLD: 18.3 % — HIGH (ref 11.5–14.5)
SODIUM SERPL-SCNC: 139 MMOL/L — SIGNIFICANT CHANGE UP (ref 135–146)
SODIUM SERPL-SCNC: 142 MMOL/L — SIGNIFICANT CHANGE UP (ref 135–146)
SPECIMEN SOURCE: SIGNIFICANT CHANGE UP
WBC # BLD: 5.52 K/UL — SIGNIFICANT CHANGE UP (ref 4.8–10.8)
WBC # FLD AUTO: 5.52 K/UL — SIGNIFICANT CHANGE UP (ref 4.8–10.8)

## 2022-11-25 PROCEDURE — 71045 X-RAY EXAM CHEST 1 VIEW: CPT | Mod: 26

## 2022-11-25 PROCEDURE — 99232 SBSQ HOSP IP/OBS MODERATE 35: CPT

## 2022-11-25 RX ORDER — LINEZOLID 600 MG/300ML
600 INJECTION, SOLUTION INTRAVENOUS EVERY 12 HOURS
Refills: 0 | Status: DISCONTINUED | OUTPATIENT
Start: 2022-11-25 | End: 2022-12-01

## 2022-11-25 RX ORDER — LINEZOLID 600 MG/300ML
600 INJECTION, SOLUTION INTRAVENOUS ONCE
Refills: 0 | Status: COMPLETED | OUTPATIENT
Start: 2022-11-25 | End: 2022-11-25

## 2022-11-25 RX ORDER — LINEZOLID 600 MG/300ML
INJECTION, SOLUTION INTRAVENOUS
Refills: 0 | Status: DISCONTINUED | OUTPATIENT
Start: 2022-11-25 | End: 2022-12-01

## 2022-11-25 RX ORDER — POTASSIUM CHLORIDE 20 MEQ
20 PACKET (EA) ORAL
Refills: 0 | Status: COMPLETED | OUTPATIENT
Start: 2022-11-25 | End: 2022-11-25

## 2022-11-25 RX ADMIN — LINEZOLID 300 MILLIGRAM(S): 600 INJECTION, SOLUTION INTRAVENOUS at 16:12

## 2022-11-25 RX ADMIN — PANTOPRAZOLE SODIUM 40 MILLIGRAM(S): 20 TABLET, DELAYED RELEASE ORAL at 06:00

## 2022-11-25 RX ADMIN — OXYCODONE HYDROCHLORIDE 5 MILLIGRAM(S): 5 TABLET ORAL at 06:00

## 2022-11-25 RX ADMIN — DIVALPROEX SODIUM 250 MILLIGRAM(S): 500 TABLET, DELAYED RELEASE ORAL at 05:57

## 2022-11-25 RX ADMIN — Medication 40 MILLIGRAM(S): at 05:56

## 2022-11-25 RX ADMIN — Medication 81 MILLIGRAM(S): at 11:54

## 2022-11-25 RX ADMIN — Medication 2 UNIT(S): at 17:14

## 2022-11-25 RX ADMIN — OXYCODONE HYDROCHLORIDE 5 MILLIGRAM(S): 5 TABLET ORAL at 18:29

## 2022-11-25 RX ADMIN — Medication 100 MILLIGRAM(S): at 06:00

## 2022-11-25 RX ADMIN — DIVALPROEX SODIUM 250 MILLIGRAM(S): 500 TABLET, DELAYED RELEASE ORAL at 17:12

## 2022-11-25 RX ADMIN — SPIRONOLACTONE 50 MILLIGRAM(S): 25 TABLET, FILM COATED ORAL at 05:57

## 2022-11-25 RX ADMIN — OXYCODONE HYDROCHLORIDE 5 MILLIGRAM(S): 5 TABLET ORAL at 20:14

## 2022-11-25 RX ADMIN — ZOLPIDEM TARTRATE 5 MILLIGRAM(S): 10 TABLET ORAL at 22:02

## 2022-11-25 RX ADMIN — OXYCODONE HYDROCHLORIDE 5 MILLIGRAM(S): 5 TABLET ORAL at 00:00

## 2022-11-25 RX ADMIN — POLYETHYLENE GLYCOL 3350 17 GRAM(S): 17 POWDER, FOR SOLUTION ORAL at 11:54

## 2022-11-25 RX ADMIN — SACUBITRIL AND VALSARTAN 1 TABLET(S): 24; 26 TABLET, FILM COATED ORAL at 05:56

## 2022-11-25 RX ADMIN — Medication 2 UNIT(S): at 08:19

## 2022-11-25 RX ADMIN — BUDESONIDE AND FORMOTEROL FUMARATE DIHYDRATE 2 PUFF(S): 160; 4.5 AEROSOL RESPIRATORY (INHALATION) at 22:02

## 2022-11-25 RX ADMIN — ATORVASTATIN CALCIUM 80 MILLIGRAM(S): 80 TABLET, FILM COATED ORAL at 22:02

## 2022-11-25 RX ADMIN — SACUBITRIL AND VALSARTAN 1 TABLET(S): 24; 26 TABLET, FILM COATED ORAL at 17:12

## 2022-11-25 RX ADMIN — Medication 100 MILLIGRAM(S): at 05:57

## 2022-11-25 RX ADMIN — OXYCODONE HYDROCHLORIDE 5 MILLIGRAM(S): 5 TABLET ORAL at 00:30

## 2022-11-25 RX ADMIN — ENOXAPARIN SODIUM 40 MILLIGRAM(S): 100 INJECTION SUBCUTANEOUS at 17:13

## 2022-11-25 RX ADMIN — Medication 40 MILLIGRAM(S): at 17:12

## 2022-11-25 RX ADMIN — OXYCODONE HYDROCHLORIDE 5 MILLIGRAM(S): 5 TABLET ORAL at 12:31

## 2022-11-25 NOTE — PROGRESS NOTE ADULT - SUBJECTIVE AND OBJECTIVE BOX
USMAN WILLIAN  57y  Female      Patient is a 57y old  Female who presents with a chief complaint of CHrEF Exacerbation, and  [facility] (20 Nov 2022 13:30)      INTERVAL HPI/OVERNIGHT EVENTS:  She feels better, she was  of coldness in feet.   Vital Signs Last 24 Hrs  T(C): 36.2 (25 Nov 2022 13:56), Max: 36.7 (24 Nov 2022 21:18)  T(F): 97.2 (25 Nov 2022 13:56), Max: 98 (24 Nov 2022 21:18)  HR: 83 (25 Nov 2022 13:56) (63 - 83)  BP: 116/61 (25 Nov 2022 13:56) (105/56 - 186/90)  BP(mean): --  RR: 18 (25 Nov 2022 13:56) (18 - 18)  SpO2: 94% (25 Nov 2022 07:43) (92% - 98%)    Parameters below as of 25 Nov 2022 07:43  Patient On (Oxygen Delivery Method): nasal cannula  O2 Flow (L/min): 4        11-24-22 @ 07:01  -  11-25-22 @ 07:00  --------------------------------------------------------  IN: 1350 mL / OUT: 2474 mL / NET: -1124 mL    11-25-22 @ 07:01  -  11-25-22 @ 14:09  --------------------------------------------------------  IN: 210 mL / OUT: 0 mL / NET: 210 mL            Consultant(s) Notes Reviewed:  [x ] YES  [ ] NO          MEDICATIONS  (STANDING):  aspirin enteric coated 81 milliGRAM(s) Oral daily  atorvastatin 80 milliGRAM(s) Oral at bedtime  budesonide 160 MICROgram(s)/formoterol 4.5 MICROgram(s) Inhaler 2 Puff(s) Inhalation two times a day  divalproex  milliGRAM(s) Oral two times a day  enoxaparin Injectable 40 milliGRAM(s) SubCutaneous every 24 hours  furosemide   Injectable 40 milliGRAM(s) IV Push two times a day  influenza   Vaccine 0.5 milliLiter(s) IntraMuscular once  insulin lispro (ADMELOG) corrective regimen sliding scale   SubCutaneous three times a day before meals  insulin lispro Injectable (ADMELOG) 2 Unit(s) SubCutaneous three times a day before meals  linezolid  IVPB      linezolid  IVPB 600 milliGRAM(s) IV Intermittent once  linezolid  IVPB 600 milliGRAM(s) IV Intermittent every 12 hours  metoprolol succinate  milliGRAM(s) Oral daily  pantoprazole    Tablet 40 milliGRAM(s) Oral before breakfast  polyethylene glycol 3350 17 Gram(s) Oral daily  potassium chloride  20 mEq/100 mL IVPB 20 milliEquivalent(s) IV Intermittent every 2 hours  sacubitril 49 mG/valsartan 51 mG 1 Tablet(s) Oral two times a day  spironolactone 50 milliGRAM(s) Oral daily  zolpidem 5 milliGRAM(s) Oral at bedtime    MEDICATIONS  (PRN):  acetaminophen     Tablet .. 650 milliGRAM(s) Oral every 6 hours PRN Temp greater or equal to 38C (100.4F), Mild Pain (1 - 3)  albuterol    90 MICROgram(s) HFA Inhaler 2 Puff(s) Inhalation every 6 hours PRN Bronchospasm  loratadine 10 milliGRAM(s) Oral daily PRN itchiness  oxyCODONE    IR 5 milliGRAM(s) Oral every 6 hours PRN Moderate Pain (4 - 6)      LABS                          11.0   5.52  )-----------( 141      ( 25 Nov 2022 05:40 )             33.8     11-25    139  |  92<L>  |  19  ----------------------------<  123<H>  3.4<L>   |  37<H>  |  1.3    Ca    9.0      25 Nov 2022 05:40  Mg     2.0     11-25    TPro  5.9<L>  /  Alb  3.4<L>  /  TBili  1.0  /  DBili  x   /  AST  17  /  ALT  6   /  AlkPhos  96  11-25          Lactate Trend        CAPILLARY BLOOD GLUCOSE      POCT Blood Glucose.: 82 mg/dL (25 Nov 2022 11:25)      Culture - Acid Fast - Other w/Smear (collected 11-23-22 @ 10:02)  Source: .Other None    Culture - Surgical Swab (collected 11-23-22 @ 10:02)  Source: .Surgical Swab None  Preliminary Report (11-24-22 @ 15:16):    No growth    Culture - Blood (collected 11-22-22 @ 21:51)  Source: .Blood Blood  Preliminary Report (11-24-22 @ 03:01):    No growth to date.    Culture - Blood (collected 11-22-22 @ 05:21)  Source: .Blood None  Preliminary Report (11-23-22 @ 15:06):    No growth to date.    Culture - Blood (collected 11-21-22 @ 07:30)  Source: .Blood Blood-Peripheral  Preliminary Report (11-22-22 @ 23:01):    No growth to date.    Culture - Blood (collected 11-19-22 @ 22:11)  Source: .Blood Blood  Final Report (11-25-22 @ 07:01):    No Growth Final    Culture - Blood (collected 11-19-22 @ 04:59)  Source: .Blood Blood-Peripheral  Gram Stain (11-20-22 @ 06:53):    Growth in anaerobic bottle: Gram positive cocci in pairs  Final Report (11-21-22 @ 12:08):    Growth in anaerobic bottle: Staphylococcus haemolyticus Coag Negative    Staphylococcus    Single set isolate, possible contaminant. Contact    Microbiology if susceptibility testing clinically    indicated.    Growth in anaerobic bottle: Streptococcus salivarius/vestibularis group    Growth in anaerobic bottle: Streptococcus mitis/oralis group    Alpha hemolytic strep    (not Strep. pneumoniae or Enterococcus)    Single set isolate, possible contaminant. Contact    Microbiology if susceptibility testing clinically    indicated.    ***Blood Panel PCR results on this specimen are available    approximately 3 hours after the Gram stain result.***    Gram stain, PCR, and/or culture results may not always    correspond due to difference in methodologies.    ************************************************************    This PCR assay was performed by multiplex PCR. This    Assay tests for 66 bacterial and resistance gene targets.    Please refer to the [facility] test directory    at [email or web address] for details.  Organism: Blood Culture PCR (11-21-22 @ 12:08)  Organism: Blood Culture PCR (11-21-22 @ 12:08)      -  Coagulase negative Staphylococcus: Detec      -  Streptococcus sp. (Not Grp A, B or S pneumoniae): Detec      Method Type: PCR    Culture - Blood (collected 11-19-22 @ 04:59)  Source: .Blood Blood-Peripheral  Final Report (11-24-22 @ 13:00):    No Growth Final        RADIOLOGY & ADDITIONAL TESTS:    Imaging Personally Reviewed:  [ ] YES  [ ] NO    HEALTH ISSUES - PROBLEM Dx:          PHYSICAL EXAM:  GENERAL: NAD, well-developed.  HEAD:  Atraumatic, Normocephalic.  EYES: EOMI, PERRLA, conjunctiva and sclera clear.  NECK: Supple, No JVD.  CHEST/LUNG: bilateral ronchi  HEART: Regular rate and rhythm; S1 S2.   ABDOMEN: Soft, Nontender, Nondistended; Bowel sounds present.  EXTREMITIES:  2+ Peripheral Pulses, No clubbing, cyanosis, or edema.  PSYCH: AAOx3.  NEUROLOGY: chronic left side weakness.   SKIN: No rashes or lesions.

## 2022-11-25 NOTE — DISCHARGE NOTE NURSING/CASE MANAGEMENT/SOCIAL WORK - NSDCPEWEB_GEN_ALL_CORE
[facility] for Tobacco Control website --- [email or web address] --- [email or web address]

## 2022-11-25 NOTE — PROGRESS NOTE ADULT - SUBJECTIVE AND OBJECTIVE BOX
WILLIAN MARY  57y, Female    All available historical data reviewed    OVERNIGHT EVENTS:  11/23 S/p explant    ROS:  General: Denies rigors, nightsweats  HEENT: Denies headache, rhinorrhea, sore throat, eye pain  CV: Denies CP, palpitations  PULM: Denies wheezing, hemoptysis  GI: Denies hematemesis, hematochezia, melena  : Denies discharge, hematuria  MSK: Denies arthralgias, myalgias  SKIN: Denies rash, lesions  NEURO: Denies paresthesias, weakness  PSYCH: Denies depression, anxiety    VITALS:  T(F): 97.5, Max: 98 (11-24-22 @ 13:35)  HR: 64  BP: 130/66  RR: 18Vital Signs Last 24 Hrs  T(C): 36.4 (25 Nov 2022 05:34), Max: 36.7 (24 Nov 2022 13:35)  T(F): 97.5 (25 Nov 2022 05:34), Max: 98 (24 Nov 2022 13:35)  HR: 64 (25 Nov 2022 05:34) (63 - 73)  BP: 130/66 (25 Nov 2022 05:34) (105/56 - 186/90)  BP(mean): --  RR: 18 (24 Nov 2022 13:35) (18 - 18)  SpO2: 94% (25 Nov 2022 07:43) (92% - 98%)    Parameters below as of 25 Nov 2022 07:43  Patient On (Oxygen Delivery Method): nasal cannula  O2 Flow (L/min): 4      TESTS & MEASUREMENTS:                        11.0   5.52  )-----------( 141      ( 25 Nov 2022 05:40 )             33.8     11-25    139  |  92<L>  |  19  ----------------------------<  123<H>  3.4<L>   |  37<H>  |  1.3    Ca    9.0      25 Nov 2022 05:40  Mg     2.0     11-25    TPro  5.9<L>  /  Alb  3.4<L>  /  TBili  1.0  /  DBili  x   /  AST  17  /  ALT  6   /  AlkPhos  96  11-25    LIVER FUNCTIONS - ( 25 Nov 2022 05:40 )  Alb: 3.4 g/dL / Pro: 5.9 g/dL / ALK PHOS: 96 U/L / ALT: 6 U/L / AST: 17 U/L / GGT: x             Culture - Acid Fast - Other w/Smear (collected 11-23-22 @ 10:02)  Source: .Other None    Culture - Surgical Swab (collected 11-23-22 @ 10:02)  Source: .Surgical Swab None  Preliminary Report (11-24-22 @ 15:16):    No growth    Culture - Blood (collected 11-22-22 @ 21:51)  Source: .Blood Blood  Preliminary Report (11-24-22 @ 03:01):    No growth to date.    Culture - Blood (collected 11-22-22 @ 05:21)  Source: .Blood None  Preliminary Report (11-23-22 @ 15:06):    No growth to date.    Culture - Blood (collected 11-21-22 @ 07:30)  Source: .Blood Blood-Peripheral  Preliminary Report (11-22-22 @ 23:01):    No growth to date.    Culture - Blood (collected 11-19-22 @ 22:11)  Source: .Blood Blood  Final Report (11-25-22 @ 07:01):    No Growth Final    Culture - Blood (collected 11-19-22 @ 04:59)  Source: .Blood Blood-Peripheral  Gram Stain (11-20-22 @ 06:53):    Growth in anaerobic bottle: Gram positive cocci in pairs  Final Report (11-21-22 @ 12:08):    Growth in anaerobic bottle: Staphylococcus haemolyticus Coag Negative    Staphylococcus    Single set isolate, possible contaminant. Contact    Microbiology if susceptibility testing clinically    indicated.    Growth in anaerobic bottle: Streptococcus salivarius/vestibularis group    Growth in anaerobic bottle: Streptococcus mitis/oralis group    Alpha hemolytic strep    (not Strep. pneumoniae or Enterococcus)    Single set isolate, possible contaminant. Contact    Microbiology if susceptibility testing clinically    indicated.    ***Blood Panel PCR results on this specimen are available    approximately 3 hours after the Gram stain result.***    Gram stain, PCR, and/or culture results may not always    correspond due to difference in methodologies.    ************************************************************    This PCR assay was performed by multiplex PCR. This    Assay tests for 66 bacterial and resistance gene targets.    Please refer to the [facility] test directory    at [email or web address] for details.  Organism: Blood Culture PCR (11-21-22 @ 12:08)  Organism: Blood Culture PCR (11-21-22 @ 12:08)      -  Coagulase negative Staphylococcus: Detec      -  Streptococcus sp. (Not Grp A, B or S pneumoniae): Detec      Method Type: PCR    Culture - Blood (collected 11-19-22 @ 04:59)  Source: .Blood Blood-Peripheral  Final Report (11-24-22 @ 13:00):    No Growth Final            RADIOLOGY & ADDITIONAL TESTS:  Personal review of radiological diagnostics performed  Echo and EKG results noted when applicable.     MEDICATIONS:  acetaminophen     Tablet .. 650 milliGRAM(s) Oral every 6 hours PRN  albuterol    90 MICROgram(s) HFA Inhaler 2 Puff(s) Inhalation every 6 hours PRN  aspirin enteric coated 81 milliGRAM(s) Oral daily  atorvastatin 80 milliGRAM(s) Oral at bedtime  budesonide 160 MICROgram(s)/formoterol 4.5 MICROgram(s) Inhaler 2 Puff(s) Inhalation two times a day  cefTRIAXone   IVPB 2000 milliGRAM(s) IV Intermittent every 24 hours  divalproex  milliGRAM(s) Oral two times a day  enoxaparin Injectable 40 milliGRAM(s) SubCutaneous every 24 hours  furosemide   Injectable 40 milliGRAM(s) IV Push two times a day  influenza   Vaccine 0.5 milliLiter(s) IntraMuscular once  insulin lispro (ADMELOG) corrective regimen sliding scale   SubCutaneous three times a day before meals  insulin lispro Injectable (ADMELOG) 2 Unit(s) SubCutaneous three times a day before meals  loratadine 10 milliGRAM(s) Oral daily PRN  metoprolol succinate  milliGRAM(s) Oral daily  oxyCODONE    IR 5 milliGRAM(s) Oral every 6 hours PRN  pantoprazole    Tablet 40 milliGRAM(s) Oral before breakfast  polyethylene glycol 3350 17 Gram(s) Oral daily  sacubitril 49 mG/valsartan 51 mG 1 Tablet(s) Oral two times a day  spironolactone 50 milliGRAM(s) Oral daily  vancomycin  IVPB 500 milliGRAM(s) IV Intermittent every 12 hours  zolpidem 5 milliGRAM(s) Oral at bedtime      ANTIBIOTICS:  cefTRIAXone   IVPB 2000 milliGRAM(s) IV Intermittent every 24 hours  vancomycin  IVPB 500 milliGRAM(s) IV Intermittent every 12 hours

## 2022-11-25 NOTE — DISCHARGE NOTE NURSING/CASE MANAGEMENT/SOCIAL WORK - PATIENT PORTAL LINK FT
You can access the FollowMyHealth Patient Portal offered by [facility] by registering at the following website: [email or web address]. By joining [employer]’s FollowMyHealth portal, you will also be able to view your health information using other applications (apps) compatible with our system.

## 2022-11-25 NOTE — PROGRESS NOTE ADULT - ASSESSMENT
· Assessment	  57 year old female with PMH of COPD on 4 L home O2, CORNELIUS on CPAP, HFrEF (19% on TTE 02/2022, life vest at home), pulmonary HTN, CAD s/p PCI to RCA, HTN, HLD, CVA with residual LLE weakness, DM, active smoker on home O2 4L, presented to the ED for SOB and LE and UE edema. Notably, patient was here in 4/2022, but requested to be discharged prior to getting an AICD placed. in the interim, she had the AICD placed at [facility]. Patient began to experience SOB, and swelling in her arms and legs. SOB has worsened over the last 3 days. Pt states that she stopped her diuretic recently. Additionally, she noticed pain and "leaking" from the AICD site since it was placed 2 months ago associated with left UE swelling. Noted pt was also prescribed fluconazole and rifampin for 30 days supply from Oct 19, 2022 from [employer]. She denies fevers, chills, chest pain, or palpitations.    IMPRESSION;   11/23 S/p explant of AICD with pocket space abscess   11/23 OR Cultures NGTD  11/19 1/3 BCx CNS  11/21,22 BCx NGTD  WBC 5.5    RECOMMENDATIONS;  po Linezolid 600 mg q12h for 7 days starting 11/23  Please do not hesitate to recall ID if any questions arise either through [employer] or through [employer]

## 2022-11-25 NOTE — PROGRESS NOTE ADULT - ASSESSMENT
58 yo F PMHx COPD on 4 L home O2, CORNELIUS on CPAP, chronic HFrEF (19% on TTE 02/2022, life vest at home), pulmonary HTN, CAD s/p PCI to RCA, HTN, HLD, CVA with residual LLE weakness, DM II, active smoker on home O2, presented to the ED for SOB and LE and UE edema. Pt found to have CHF exacerbation as well as cellulitis.     A/P:   Acute on chronic HFrEF exacerbation:    SOB, leg edema. Pro-BNP 26K (baseline 3-4K).   Echo 3/2022: 30-35% EF, moderate MR and TR  Continue Bumex IV.   Continue Entresto, spironolactone, and Metoprolol.   BiPAP prn.    Left sided AICD cellulitis pocket abscess:   Bacteremia: coag negative staph,   Left Upper Extremity cellulitis:   s/p AICD extraction 11/23/22. Wound care.   s/p Rocephin and Vancomycin.  ID recommended Linezolid 600mg BID for 7 days.     Chronic hypoxemic respiratory failure 2/2 COPD on 4 L at baseline   CORNELIUS on CPAP   CPAP ordered based on prior setting, PEEP 8 and FiO2 40%  Continue Symbicort and albuterol prn, continue nasal canula.     CAD s/p PCI  stent to RCA last cath in hospital 6/21/2021  Continue Aspirin, Metoprolol and statin    Peripheral vascular disease:   Patient is c/o cold feet, feet are warm on exam with palpable pulses.   Arterial duplex showed left minimal flow within the superficial femoral artery and popliteal artery through the peroneal artery below the knee.  Vascular consult.     History of CVA   Residual left side weakness  Continue ASA and Lipitor.      DM II  FS goal 110-180, A1C 5.6     Hypothyroidism: TSH 4.7, FT4 1.3 normal, Continue Levothyroxine.     CKD III: Stable     Suspected mood disorder  Continue divalproex 250 mg BID    DVT prophylaxis  GI prophylaxis: on Protonix  Code status: Full Code    #Progress Note Handoff:  Pending (specify): IV diuresis, life vest.   Family discussion:  Disposition: Home with home care.

## 2022-11-25 NOTE — DISCHARGE NOTE NURSING/CASE MANAGEMENT/SOCIAL WORK - NSDCPEFALRISK_GEN_ALL_CORE
For information on Fall & Injury Prevention, visit: [email or web address] OR  [email or web address] OR  https://www.cdc.gov/steadi/patient.html

## 2022-11-25 NOTE — DISCHARGE NOTE NURSING/CASE MANAGEMENT/SOCIAL WORK - NSDCPEEMAIL_GEN_ALL_CORE
[facility] for Tobacco Control email [email or web address]

## 2022-11-25 NOTE — PROGRESS NOTE ADULT - SUBJECTIVE AND OBJECTIVE BOX
WILLIAN MARY 57y Female  MRN#: [id number]   Hospital Day: 6d    SUBJECTIVE  Patient is a 57y old Female who presents with a chief complaint of CHrEF Exacerbation, and  suspected Cellulitis from [facility] site. (20 Nov 2022 13:30)  Currently admitted to medicine with the primary diagnosis of CHF exacerbation      INTERVAL HPI AND OVERNIGHT EVENTS:  Patient was examined and seen at bedside. This morning she is resting comfortably in bed and reports no issues or overnight events.    OBJECTIVE  PAST MEDICAL & SURGICAL HISTORY  Hypertension    Hyperlipidemia    Anxiety and depression    COPD, severe    CHF (congestive heart failure)    Cerebrovascular accident (CVA)  Multiple    Type 2 diabetes mellitus    CKD (chronic kidney disease), stage II    No significant past surgical history      ALLERGIES:  No Known Allergies    MEDICATIONS:  STANDING MEDICATIONS  aspirin enteric coated 81 milliGRAM(s) Oral daily  atorvastatin 80 milliGRAM(s) Oral at bedtime  budesonide 160 MICROgram(s)/formoterol 4.5 MICROgram(s) Inhaler 2 Puff(s) Inhalation two times a day  divalproex  milliGRAM(s) Oral two times a day  enoxaparin Injectable 40 milliGRAM(s) SubCutaneous every 24 hours  furosemide   Injectable 40 milliGRAM(s) IV Push two times a day  influenza   Vaccine 0.5 milliLiter(s) IntraMuscular once  insulin lispro (ADMELOG) corrective regimen sliding scale   SubCutaneous three times a day before meals  insulin lispro Injectable (ADMELOG) 2 Unit(s) SubCutaneous three times a day before meals  linezolid  IVPB      linezolid  IVPB 600 milliGRAM(s) IV Intermittent once  linezolid  IVPB 600 milliGRAM(s) IV Intermittent every 12 hours  metoprolol succinate  milliGRAM(s) Oral daily  pantoprazole    Tablet 40 milliGRAM(s) Oral before breakfast  polyethylene glycol 3350 17 Gram(s) Oral daily  sacubitril 49 mG/valsartan 51 mG 1 Tablet(s) Oral two times a day  spironolactone 50 milliGRAM(s) Oral daily  zolpidem 5 milliGRAM(s) Oral at bedtime    PRN MEDICATIONS  acetaminophen     Tablet .. 650 milliGRAM(s) Oral every 6 hours PRN  albuterol    90 MICROgram(s) HFA Inhaler 2 Puff(s) Inhalation every 6 hours PRN  loratadine 10 milliGRAM(s) Oral daily PRN  oxyCODONE    IR 5 milliGRAM(s) Oral every 6 hours PRN      VITAL SIGNS: Last 24 Hours  T(C): 36.4 (25 Nov 2022 05:34), Max: 36.7 (24 Nov 2022 13:35)  T(F): 97.5 (25 Nov 2022 05:34), Max: 98 (24 Nov 2022 13:35)  HR: 64 (25 Nov 2022 05:34) (63 - 73)  BP: 130/66 (25 Nov 2022 05:34) (105/56 - 186/90)  BP(mean): --  RR: 18 (24 Nov 2022 13:35) (18 - 18)  SpO2: 94% (25 Nov 2022 07:43) (92% - 98%)    LABS:                        11.0   5.52  )-----------( 141      ( 25 Nov 2022 05:40 )             33.8     11-25    139  |  92<L>  |  19  ----------------------------<  123<H>  3.4<L>   |  37<H>  |  1.3    Ca    9.0      25 Nov 2022 05:40  Mg     2.0     11-25    TPro  5.9<L>  /  Alb  3.4<L>  /  TBili  1.0  /  DBili  x   /  AST  17  /  ALT  6   /  AlkPhos  96  11-25              Culture - Acid Fast - Other w/Smear (collected 23 Nov 2022 10:02)  Source: .Other None    Culture - Surgical Swab (collected 23 Nov 2022 10:02)  Source: .Surgical Swab None  Preliminary Report (24 Nov 2022 15:16):    No growth    Culture - Blood (collected 22 Nov 2022 21:51)  Source: .Blood Blood  Preliminary Report (24 Nov 2022 03:01):    No growth to date.          RADIOLOGY:      PHYSICAL EXAM:  CONSTITUTIONAL: No acute distress, AAOx3  HEAD: Atraumatic, normocephalic  EYES: EOM intact, PERRLA, conjunctiva and sclera clear  ENT: moist mucous membranes  PULMONARY: Clear to auscultation bilaterally  CARDIOVASCULAR: Regular rate and rhythm  GASTROINTESTINAL: Soft, non-tender, non-distended; bowel sounds present  MUSCULOSKELETAL: no edema  NEUROLOGY: non-focal  SKIN: warm and dry     WILLIAN MARY 57y Female  MRN#: [id number]   Hospital Day: 6d    SUBJECTIVE  Patient is a 57y old Female who presents with a chief complaint of CHrEF Exacerbation, and  suspected Cellulitis from [facility] site. (20 Nov 2022 13:30)  Currently admitted to medicine with the primary diagnosis of CHF exacerbation      INTERVAL HPI AND OVERNIGHT EVENTS:  Patient was examined and seen at bedside. This morning she is resting comfortably in bed and reports no issues or overnight events.    OBJECTIVE  PAST MEDICAL & SURGICAL HISTORY  Hypertension    Hyperlipidemia    Anxiety and depression    COPD, severe    CHF (congestive heart failure)    Cerebrovascular accident (CVA)  Multiple    Type 2 diabetes mellitus    CKD (chronic kidney disease), stage II    No significant past surgical history      ALLERGIES:  No Known Allergies    MEDICATIONS:  STANDING MEDICATIONS  aspirin enteric coated 81 milliGRAM(s) Oral daily  atorvastatin 80 milliGRAM(s) Oral at bedtime  budesonide 160 MICROgram(s)/formoterol 4.5 MICROgram(s) Inhaler 2 Puff(s) Inhalation two times a day  divalproex  milliGRAM(s) Oral two times a day  enoxaparin Injectable 40 milliGRAM(s) SubCutaneous every 24 hours  furosemide   Injectable 40 milliGRAM(s) IV Push two times a day  influenza   Vaccine 0.5 milliLiter(s) IntraMuscular once  insulin lispro (ADMELOG) corrective regimen sliding scale   SubCutaneous three times a day before meals  insulin lispro Injectable (ADMELOG) 2 Unit(s) SubCutaneous three times a day before meals  linezolid  IVPB      linezolid  IVPB 600 milliGRAM(s) IV Intermittent once  linezolid  IVPB 600 milliGRAM(s) IV Intermittent every 12 hours  metoprolol succinate  milliGRAM(s) Oral daily  pantoprazole    Tablet 40 milliGRAM(s) Oral before breakfast  polyethylene glycol 3350 17 Gram(s) Oral daily  sacubitril 49 mG/valsartan 51 mG 1 Tablet(s) Oral two times a day  spironolactone 50 milliGRAM(s) Oral daily  zolpidem 5 milliGRAM(s) Oral at bedtime    PRN MEDICATIONS  acetaminophen     Tablet .. 650 milliGRAM(s) Oral every 6 hours PRN  albuterol    90 MICROgram(s) HFA Inhaler 2 Puff(s) Inhalation every 6 hours PRN  loratadine 10 milliGRAM(s) Oral daily PRN  oxyCODONE    IR 5 milliGRAM(s) Oral every 6 hours PRN      VITAL SIGNS: Last 24 Hours  T(C): 36.4 (25 Nov 2022 05:34), Max: 36.7 (24 Nov 2022 13:35)  T(F): 97.5 (25 Nov 2022 05:34), Max: 98 (24 Nov 2022 13:35)  HR: 64 (25 Nov 2022 05:34) (63 - 73)  BP: 130/66 (25 Nov 2022 05:34) (105/56 - 186/90)  BP(mean): --  RR: 18 (24 Nov 2022 13:35) (18 - 18)  SpO2: 94% (25 Nov 2022 07:43) (92% - 98%)    LABS:                        11.0   5.52  )-----------( 141      ( 25 Nov 2022 05:40 )             33.8     11-25    139  |  92<L>  |  19  ----------------------------<  123<H>  3.4<L>   |  37<H>  |  1.3    Ca    9.0      25 Nov 2022 05:40  Mg     2.0     11-25    TPro  5.9<L>  /  Alb  3.4<L>  /  TBili  1.0  /  DBili  x   /  AST  17  /  ALT  6   /  AlkPhos  96  11-25              Culture - Acid Fast - Other w/Smear (collected 23 Nov 2022 10:02)  Source: .Other None    Culture - Surgical Swab (collected 23 Nov 2022 10:02)  Source: .Surgical Swab None  Preliminary Report (24 Nov 2022 15:16):    No growth    Culture - Blood (collected 22 Nov 2022 21:51)  Source: .Blood Blood  Preliminary Report (24 Nov 2022 03:01):    No growth to date.          RADIOLOGY:  < from: [facility] Duplex Lower Extrem Arterial, Bilat (11.24.22 @ 22:35) >  Impression:    Severe peripheral arterial disease. Compared with prior study done in   February 2022 the left lower extremity demonstrates decrease in arterial   flow.    Right:  Flow seen within the external iliac and deep femoral arteries and   numerous collaterals providing flow below the knee through the anterior   tibial artery.    Left:  Flow seen within the external iliac and deep femoral arteries with   minimal flow within the superficial femoral artery and popliteal artery   through the peroneal artery below the knee.    < end of copied text >      PHYSICAL EXAM:  CONSTITUTIONAL: No acute distress, euvolemic,  AAOx3  HEAD: Atraumatic, normocephalic  EYES: EOM intact, PERRLA, conjunctiva and sclera clear  ENT: moist mucous membranes  PULMONARY: Clear to auscultation, on NC  CARDIOVASCULAR: Regular rate and rhythm  GASTROINTESTINAL: Soft, non-tender, non-distended; bowel sounds present  MUSCULOSKELETAL: no edema, decreased pulses on bilateral feet  NEUROLOGY: non-focal  SKIN: warm and dry    ASSESSMENT and PLAN    57 year old female with PMH of COPD on 4 L home O2, CORNELIUS on CPAP, HFrEF (19% on TTE 02/2022, s/p ICD, pulmonary HTN, CAD s/p PCI to RCA, HTN, HLD, CVA with residual LLE weakness, DM, active smoker on home O2, presented to the ED for SOB and LE and UE edema.      # Cellulitis, infection of left sided AICD pocket site  # Bacteremia, blood culture growing Strep spp. and Coag negative Staph  - started on IV Linezolid 600 mg q12h for 7 days per ID recs. ID suggested PO, but since the patient is intermittently using BIPAP we opted for IV form  - follow up blood culture (Nov 19 and Nov 21 so far negative)  - S/p explantation on 11/23  - YOKASTA cancelled on 11/21 due to increased work of breathing, cardio not planning to attempt YOKASTA at the moment because patient is at high risk of getting intubated as per the anesthesia team, pt will likely get extended IV abx therapy.  -  Wound cultures from explantation show no growth to date (11/25)    # Acute HFrEF exacerbation   - previous TTE March 2022: 30-35% EF, moderate MR and TR  - C/w Lasix 40mg IV BID for one more day and then transition to home meds  - on Entresto and spironolactone  - on Toprol 100 mg daily  - PT saw her -> recommended STR    #LUE swelling  - Duplex Arterial -> Severe peripheral arterial disease. Compared with prior study done in February 2022. Pt was unable to follow up with vascular as an outpatient, due to difficulties with transportation. Patient complaining of cold feet. Vascular re-consulted.  - Duplex Venous -> No DVT    # Bilateral LE swelling  - Improved  - likely 2/2 CHF  - Negative LE duplex    # Hypomagnesemia  - Replete as needed    # Chronic hypoxemic respiratory failure 2/2 COPD on 4 L at baseline   # CORNELIUS on CPAP   - CPAP ordered based on prior setting, PEEP 8 and FiO2 40%, but patient no compliant  - c/w Symbicort and Albuterol prn  - c/w oxygen supplementation    # CAD   - s/p PCI+stent to RCA last cath in hospital June 2021  - c/w aspirin and statin    # Elevated troponin  - likely from demand of CHF exacerbation     # Hx of CVA with residual LE weakness  - currently wheelchair bound   - c/w aspirin, statin     # DM II  - Low fasting sugar, Lantus held  - On sliding scale and standing pre-meals    # Hypothyroidism   - c/w levothyroxine    # CKD stage 2, stable   -  monitor BMP    # Suspected seizure disorder vs. mood disorder  - divalproex picked up on sure scripts   - c/w divalproex 250 mg BID    # DVT ppx: Lovenox resumed  # GI ppx: Pantoprazole  # Code status - Full Code  Handoff: s/p explant of ICD, On IV lasix, YOKASTA not probable at this time          WILLIAN MARY 57y Female  MRN#: [id number]   Hospital Day: 6d    SUBJECTIVE  Patient is a 57y old Female who presents with a chief complaint of CHrEF Exacerbation, and  suspected Cellulitis from [facility] site. (20 Nov 2022 13:30)  Currently admitted to medicine with the primary diagnosis of CHF exacerbation      INTERVAL HPI AND OVERNIGHT EVENTS:  Patient was examined and seen at bedside. This morning she is resting comfortably in bed and reports no issues or overnight events.    OBJECTIVE  PAST MEDICAL & SURGICAL HISTORY  Hypertension    Hyperlipidemia    Anxiety and depression    COPD, severe    CHF (congestive heart failure)    Cerebrovascular accident (CVA)  Multiple    Type 2 diabetes mellitus    CKD (chronic kidney disease), stage II    No significant past surgical history      ALLERGIES:  No Known Allergies    MEDICATIONS:  STANDING MEDICATIONS  aspirin enteric coated 81 milliGRAM(s) Oral daily  atorvastatin 80 milliGRAM(s) Oral at bedtime  budesonide 160 MICROgram(s)/formoterol 4.5 MICROgram(s) Inhaler 2 Puff(s) Inhalation two times a day  divalproex  milliGRAM(s) Oral two times a day  enoxaparin Injectable 40 milliGRAM(s) SubCutaneous every 24 hours  furosemide   Injectable 40 milliGRAM(s) IV Push two times a day  influenza   Vaccine 0.5 milliLiter(s) IntraMuscular once  insulin lispro (ADMELOG) corrective regimen sliding scale   SubCutaneous three times a day before meals  insulin lispro Injectable (ADMELOG) 2 Unit(s) SubCutaneous three times a day before meals  linezolid  IVPB      linezolid  IVPB 600 milliGRAM(s) IV Intermittent once  linezolid  IVPB 600 milliGRAM(s) IV Intermittent every 12 hours  metoprolol succinate  milliGRAM(s) Oral daily  pantoprazole    Tablet 40 milliGRAM(s) Oral before breakfast  polyethylene glycol 3350 17 Gram(s) Oral daily  sacubitril 49 mG/valsartan 51 mG 1 Tablet(s) Oral two times a day  spironolactone 50 milliGRAM(s) Oral daily  zolpidem 5 milliGRAM(s) Oral at bedtime    PRN MEDICATIONS  acetaminophen     Tablet .. 650 milliGRAM(s) Oral every 6 hours PRN  albuterol    90 MICROgram(s) HFA Inhaler 2 Puff(s) Inhalation every 6 hours PRN  loratadine 10 milliGRAM(s) Oral daily PRN  oxyCODONE    IR 5 milliGRAM(s) Oral every 6 hours PRN      VITAL SIGNS: Last 24 Hours  T(C): 36.4 (25 Nov 2022 05:34), Max: 36.7 (24 Nov 2022 13:35)  T(F): 97.5 (25 Nov 2022 05:34), Max: 98 (24 Nov 2022 13:35)  HR: 64 (25 Nov 2022 05:34) (63 - 73)  BP: 130/66 (25 Nov 2022 05:34) (105/56 - 186/90)  BP(mean): --  RR: 18 (24 Nov 2022 13:35) (18 - 18)  SpO2: 94% (25 Nov 2022 07:43) (92% - 98%)    LABS:                        11.0   5.52  )-----------( 141      ( 25 Nov 2022 05:40 )             33.8     11-25    139  |  92<L>  |  19  ----------------------------<  123<H>  3.4<L>   |  37<H>  |  1.3    Ca    9.0      25 Nov 2022 05:40  Mg     2.0     11-25    TPro  5.9<L>  /  Alb  3.4<L>  /  TBili  1.0  /  DBili  x   /  AST  17  /  ALT  6   /  AlkPhos  96  11-25    Culture - Acid Fast - Other w/Smear (collected 23 Nov 2022 10:02)  Source: .Other None    Culture - Surgical Swab (collected 23 Nov 2022 10:02)  Source: .Surgical Swab None  Preliminary Report (24 Nov 2022 15:16):    No growth    Culture - Blood (collected 22 Nov 2022 21:51)  Source: .Blood Blood  Preliminary Report (24 Nov 2022 03:01):    No growth to date.          RADIOLOGY:  < from: VA Duplex Lower Extrem Arterial, Bilat (11.24.22 @ 22:35) >  Impression:    Severe peripheral arterial disease. Compared with prior study done in   February 2022 the left lower extremity demonstrates decrease in arterial   flow.    Right:  Flow seen within the external iliac and deep femoral arteries and   numerous collaterals providing flow below the knee through the anterior   tibial artery.    Left:  Flow seen within the external iliac and deep femoral arteries with   minimal flow within the superficial femoral artery and popliteal artery   through the peroneal artery below the knee.    < end of copied text >      PHYSICAL EXAM:  CONSTITUTIONAL: No acute distress, euvolemic,  AAOx3  HEAD: Atraumatic, normocephalic  EYES: EOM intact, PERRLA, conjunctiva and sclera clear  ENT: moist mucous membranes  PULMONARY: Clear to auscultation, on NC  CARDIOVASCULAR: Regular rate and rhythm  GASTROINTESTINAL: Soft, non-tender, non-distended; bowel sounds present  MUSCULOSKELETAL: no edema, decreased pulses on bilateral feet  NEUROLOGY: non-focal  SKIN: warm and dry    ASSESSMENT and PLAN    57 year old female with PMH of COPD on 4 L home O2, CORNELIUS on CPAP, HFrEF (19% on TTE 02/2022, s/p ICD, pulmonary HTN, CAD s/p PCI to RCA, HTN, HLD, CVA with residual LLE weakness, DM, active smoker on home O2, presented to the ED for SOB and LE and UE edema.       # Cellulitis, infection of left sided AICD pocket site  # Bacteremia, blood culture growing Strep spp. and Coag negative Staph  - started on IV Linezolid 600 mg q12h for 7 days per ID recs. ID suggested PO, but since the patient is intermittently using BIPAP we opted for IV form  - follow up blood culture (Nov 19 and Nov 21 so far negative)  - S/p explantation on 11/23  - YOKASTA cancelled on 11/21 due to increased work of breathing, cardio not planning to attempt YOKASTA at the moment because patient is at high risk of getting intubated as per the anesthesia team, pt will likely get extended IV abx therapy.  -  Wound cultures from explantation show no growth to date (11/25)      # Acute HFrEF exacerbation   - previous TTE March 2022: 30-35% EF, moderate MR and TR  - C/w Lasix 40mg IV BID for one more day and then transition to home meds  - on Entresto and spironolactone  - on Toprol 100 mg daily  - PT saw her -> recommended STR    #LUE swelling  - Duplex Arterial -> Severe peripheral arterial disease. Compared with prior study done in February 2022. Pt was unable to follow up with vascular as an outpatient, due to difficulties with transportation. Patient complaining of cold feet. Vascular re-consulted.  - Duplex Venous -> No DVT    # Bilateral LE swelling  - Improved  - likely 2/2 CHF  - Negative LE duplex    # Hypomagnesemia  - Replete as needed    # Chronic hypoxemic respiratory failure 2/2 COPD on 4 L at baseline   # CORNELIUS on CPAP   - CPAP ordered based on prior setting, PEEP 8 and FiO2 40%, but patient no compliant  - c/w Symbicort and Albuterol prn  - c/w oxygen supplementation    # CAD   - s/p PCI+stent to RCA last cath in hospital June 2021  - c/w aspirin and statin    # Elevated troponin  - likely from demand of CHF exacerbation     # Hx of CVA with residual LE weakness  - currently wheelchair bound   - c/w aspirin, statin     # DM II  - Low fasting sugar, Lantus held  - On sliding scale and standing pre-meals    # Hypothyroidism   - c/w levothyroxine    # CKD stage 2, stable   -  monitor BMP    # Suspected seizure disorder vs. mood disorder  - divalproex picked up on sure scripts   - c/w divalproex 250 mg BID    # DVT ppx: Lovenox resumed  # GI ppx: Pantoprazole  # Code status - Full Code  Handoff: s/p explant of ICD, On IV lasix, YOKASTA not probable at this time          WILLIAN MARY 57y Female  MRN#: [id number]   Hospital Day: 6d    SUBJECTIVE  Patient is a 57y old Female who presents with a chief complaint of CHrEF Exacerbation, and  suspected Cellulitis from [facility] site. (20 Nov 2022 13:30)  Currently admitted to medicine with the primary diagnosis of CHF exacerbation      INTERVAL HPI AND OVERNIGHT EVENTS:  Patient was examined and seen at bedside. This morning she is resting comfortably in bed and reports no issues or overnight events.    OBJECTIVE  PAST MEDICAL & SURGICAL HISTORY  Hypertension    Hyperlipidemia    Anxiety and depression    COPD, severe    CHF (congestive heart failure)    Cerebrovascular accident (CVA)  Multiple    Type 2 diabetes mellitus    CKD (chronic kidney disease), stage II    No significant past surgical history      ALLERGIES:  No Known Allergies    MEDICATIONS:  STANDING MEDICATIONS  aspirin enteric coated 81 milliGRAM(s) Oral daily  atorvastatin 80 milliGRAM(s) Oral at bedtime  budesonide 160 MICROgram(s)/formoterol 4.5 MICROgram(s) Inhaler 2 Puff(s) Inhalation two times a day  divalproex  milliGRAM(s) Oral two times a day  enoxaparin Injectable 40 milliGRAM(s) SubCutaneous every 24 hours  furosemide   Injectable 40 milliGRAM(s) IV Push two times a day  influenza   Vaccine 0.5 milliLiter(s) IntraMuscular once  insulin lispro (ADMELOG) corrective regimen sliding scale   SubCutaneous three times a day before meals  insulin lispro Injectable (ADMELOG) 2 Unit(s) SubCutaneous three times a day before meals  linezolid  IVPB      linezolid  IVPB 600 milliGRAM(s) IV Intermittent once  linezolid  IVPB 600 milliGRAM(s) IV Intermittent every 12 hours  metoprolol succinate  milliGRAM(s) Oral daily  pantoprazole    Tablet 40 milliGRAM(s) Oral before breakfast  polyethylene glycol 3350 17 Gram(s) Oral daily  sacubitril 49 mG/valsartan 51 mG 1 Tablet(s) Oral two times a day  spironolactone 50 milliGRAM(s) Oral daily  zolpidem 5 milliGRAM(s) Oral at bedtime    PRN MEDICATIONS  acetaminophen     Tablet .. 650 milliGRAM(s) Oral every 6 hours PRN  albuterol    90 MICROgram(s) HFA Inhaler 2 Puff(s) Inhalation every 6 hours PRN  loratadine 10 milliGRAM(s) Oral daily PRN  oxyCODONE    IR 5 milliGRAM(s) Oral every 6 hours PRN      VITAL SIGNS: Last 24 Hours  T(C): 36.4 (25 Nov 2022 05:34), Max: 36.7 (24 Nov 2022 13:35)  T(F): 97.5 (25 Nov 2022 05:34), Max: 98 (24 Nov 2022 13:35)  HR: 64 (25 Nov 2022 05:34) (63 - 73)  BP: 130/66 (25 Nov 2022 05:34) (105/56 - 186/90)  BP(mean): --  RR: 18 (24 Nov 2022 13:35) (18 - 18)  SpO2: 94% (25 Nov 2022 07:43) (92% - 98%)    LABS:                        11.0   5.52  )-----------( 141      ( 25 Nov 2022 05:40 )             33.8     11-25    139  |  92<L>  |  19  ----------------------------<  123<H>  3.4<L>   |  37<H>  |  1.3    Ca    9.0      25 Nov 2022 05:40  Mg     2.0     11-25    TPro  5.9<L>  /  Alb  3.4<L>  /  TBili  1.0  /  DBili  x   /  AST  17  /  ALT  6   /  AlkPhos  96  11-25    Culture - Acid Fast - Other w/Smear (collected 23 Nov 2022 10:02)  Source: .Other None    Culture - Surgical Swab (collected 23 Nov 2022 10:02)  Source: .Surgical Swab None  Preliminary Report (24 Nov 2022 15:16):    No growth    Culture - Blood (collected 22 Nov 2022 21:51)  Source: .Blood Blood  Preliminary Report (24 Nov 2022 03:01):    No growth to date.          RADIOLOGY:  < from: VA Duplex Lower Extrem Arterial, Bilat (11.24.22 @ 22:35) >  Impression:    Severe peripheral arterial disease. Compared with prior study done in   February 2022 the left lower extremity demonstrates decrease in arterial   flow.    Right:  Flow seen within the external iliac and deep femoral arteries and   numerous collaterals providing flow below the knee through the anterior   tibial artery.    Left:  Flow seen within the external iliac and deep femoral arteries with   minimal flow within the superficial femoral artery and popliteal artery   through the peroneal artery below the knee.    < end of copied text >      PHYSICAL EXAM:  CONSTITUTIONAL: No acute distress, euvolemic,  AAOx3  HEAD: Atraumatic, normocephalic  EYES: EOM intact, PERRLA, conjunctiva and sclera clear  ENT: moist mucous membranes  PULMONARY: Clear to auscultation, on NC  CARDIOVASCULAR: Regular rate and rhythm  GASTROINTESTINAL: Soft, non-tender, non-distended; bowel sounds present  MUSCULOSKELETAL: no edema, decreased pulses on bilateral feet  NEUROLOGY: non-focal  SKIN: warm and dry    ASSESSMENT and PLAN    57 year old female with PMH of COPD on 4 L home O2, CORNELIUS on CPAP, HFrEF (19% on TTE 02/2022, s/p ICD, pulmonary HTN, CAD s/p PCI to RCA, HTN, HLD, CVA with residual LLE weakness, DM, active smoker on home O2, presented to the ED for SOB and LE and UE edema.       # Cellulitis, infection of left sided AICD pocket site  # Bacteremia, blood culture growing Strep spp. and Coag negative Staph  - started on IV Linezolid 600 mg q12h for 7 days per ID recs. ID suggested PO, but since the patient is intermittently using BIPAP we opted for IV form  - follow up blood culture (Nov 19 and Nov 21 so far negative)  - S/p explantation on 11/23  - YOKASTA cancelled on 11/21 due to increased work of breathing, cardio not planning to attempt YOKASTA at the moment because patient is at high risk of getting intubated as per the anesthesia team, pt will likely get extended IV abx therapy.  -  Wound cultures from explantation show no growth to date (11/25)      # Acute HFrEF exacerbation   - previous TTE March 2022: 30-35% EF, moderate MR and TR  - C/w Lasix 40mg IV BID for one more day and then transition to home meds  - on Entresto and spironolactone  - on Toprol 100 mg daily  - PT saw her -> recommended STR    #LUE swelling  - Duplex Arterial -> Severe peripheral arterial disease. Compared with prior study done in February 2022. Pt was unable to follow up with vascular as an outpatient, due to difficulties with transportation. Patient complaining of cold feet. Vascular re-consulted.  - Duplex Venous -> No DVT    # Bilateral LE swelling  - Improved  - likely 2/2 CHF  - Negative LE duplex    # Hypomagnesemia  - Replete as needed    # Chronic hypoxemic respiratory failure 2/2 COPD on 4 L at baseline   # CORNELIUS on CPAP   - CPAP ordered based on prior setting, PEEP 8 and FiO2 40%, but patient no compliant  - c/w Symbicort and Albuterol prn  - c/w oxygen supplementation    # CAD   - s/p PCI+stent to RCA last cath in hospital June 2021  - c/w aspirin and statin    # Elevated troponin  - likely from demand of CHF exacerbation     # Hx of CVA with residual LE weakness  - currently wheelchair bound   - c/w aspirin, statin     # DM II  - Low fasting sugar, Lantus held  - On sliding scale and standing pre-meals    # Hypothyroidism   - c/w levothyroxine    # CKD stage 2, stable   -  monitor BMP    # Suspected seizure disorder vs. mood disorder  - divalproex picked up on sure scripts   - c/w divalproex 250 mg BID    # DVT ppx: Lovenox resumed  # GI ppx: Pantoprazole  # Code status - Full Code  Handoff: s/p explant of ICD, On IV lasix, YOKASTA not probable at this time

## 2022-11-26 LAB
ANION GAP SERPL CALC-SCNC: 11 MMOL/L — SIGNIFICANT CHANGE UP (ref 7–14)
BASOPHILS # BLD AUTO: 0.08 K/UL — SIGNIFICANT CHANGE UP (ref 0–0.2)
BASOPHILS NFR BLD AUTO: 1.4 % — HIGH (ref 0–1)
BUN SERPL-MCNC: 19 MG/DL — SIGNIFICANT CHANGE UP (ref 10–20)
CALCIUM SERPL-MCNC: 8.5 MG/DL — SIGNIFICANT CHANGE UP (ref 8.4–10.5)
CHLORIDE SERPL-SCNC: 89 MMOL/L — LOW (ref 98–110)
CO2 SERPL-SCNC: 38 MMOL/L — HIGH (ref 17–32)
CREAT SERPL-MCNC: 1.3 MG/DL — SIGNIFICANT CHANGE UP (ref 0.7–1.5)
CULTURE RESULTS: SIGNIFICANT CHANGE UP
EGFR: 49 ML/MIN/1.73M2 — LOW
EOSINOPHIL # BLD AUTO: 0.21 K/UL — SIGNIFICANT CHANGE UP (ref 0–0.7)
EOSINOPHIL NFR BLD AUTO: 3.8 % — SIGNIFICANT CHANGE UP (ref 0–8)
GLUCOSE BLDC GLUCOMTR-MCNC: 158 MG/DL — HIGH (ref 70–99)
GLUCOSE BLDC GLUCOMTR-MCNC: 91 MG/DL — SIGNIFICANT CHANGE UP (ref 70–99)
GLUCOSE BLDC GLUCOMTR-MCNC: 95 MG/DL — SIGNIFICANT CHANGE UP (ref 70–99)
GLUCOSE BLDC GLUCOMTR-MCNC: 96 MG/DL — SIGNIFICANT CHANGE UP (ref 70–99)
GLUCOSE SERPL-MCNC: 99 MG/DL — SIGNIFICANT CHANGE UP (ref 70–99)
HCT VFR BLD CALC: 33.2 % — LOW (ref 37–47)
HGB BLD-MCNC: 10.8 G/DL — LOW (ref 12–16)
IMM GRANULOCYTES NFR BLD AUTO: 0.4 % — HIGH (ref 0.1–0.3)
LYMPHOCYTES # BLD AUTO: 1.29 K/UL — SIGNIFICANT CHANGE UP (ref 1.2–3.4)
LYMPHOCYTES # BLD AUTO: 23.1 % — SIGNIFICANT CHANGE UP (ref 20.5–51.1)
MAGNESIUM SERPL-MCNC: 1.7 MG/DL — LOW (ref 1.8–2.4)
MCHC RBC-ENTMCNC: 29.4 PG — SIGNIFICANT CHANGE UP (ref 27–31)
MCHC RBC-ENTMCNC: 32.5 G/DL — SIGNIFICANT CHANGE UP (ref 32–37)
MCV RBC AUTO: 90.5 FL — SIGNIFICANT CHANGE UP (ref 81–99)
MONOCYTES # BLD AUTO: 0.69 K/UL — HIGH (ref 0.1–0.6)
MONOCYTES NFR BLD AUTO: 12.4 % — HIGH (ref 1.7–9.3)
NEUTROPHILS # BLD AUTO: 3.29 K/UL — SIGNIFICANT CHANGE UP (ref 1.4–6.5)
NEUTROPHILS NFR BLD AUTO: 58.9 % — SIGNIFICANT CHANGE UP (ref 42.2–75.2)
NRBC # BLD: 0 /100 WBCS — SIGNIFICANT CHANGE UP (ref 0–0)
PLATELET # BLD AUTO: 144 K/UL — SIGNIFICANT CHANGE UP (ref 130–400)
POTASSIUM SERPL-MCNC: 3.4 MMOL/L — LOW (ref 3.5–5)
POTASSIUM SERPL-SCNC: 3.4 MMOL/L — LOW (ref 3.5–5)
RBC # BLD: 3.67 M/UL — LOW (ref 4.2–5.4)
RBC # FLD: 18 % — HIGH (ref 11.5–14.5)
SODIUM SERPL-SCNC: 138 MMOL/L — SIGNIFICANT CHANGE UP (ref 135–146)
SPECIMEN SOURCE: SIGNIFICANT CHANGE UP
VANCOMYCIN TROUGH SERPL-MCNC: 16.2 UG/ML — HIGH (ref 5–10)
WBC # BLD: 5.58 K/UL — SIGNIFICANT CHANGE UP (ref 4.8–10.8)
WBC # FLD AUTO: 5.58 K/UL — SIGNIFICANT CHANGE UP (ref 4.8–10.8)

## 2022-11-26 PROCEDURE — 99232 SBSQ HOSP IP/OBS MODERATE 35: CPT

## 2022-11-26 RX ORDER — DIPHENHYDRAMINE HCL 50 MG
25 CAPSULE ORAL ONCE
Refills: 0 | Status: COMPLETED | OUTPATIENT
Start: 2022-11-26 | End: 2022-11-26

## 2022-11-26 RX ORDER — POTASSIUM CHLORIDE 20 MEQ
20 PACKET (EA) ORAL
Refills: 0 | Status: COMPLETED | OUTPATIENT
Start: 2022-11-26 | End: 2022-11-26

## 2022-11-26 RX ORDER — MAGNESIUM SULFATE 500 MG/ML
2 VIAL (ML) INJECTION ONCE
Refills: 0 | Status: COMPLETED | OUTPATIENT
Start: 2022-11-26 | End: 2022-11-26

## 2022-11-26 RX ORDER — OXYCODONE HYDROCHLORIDE 5 MG/1
5 TABLET ORAL ONCE
Refills: 0 | Status: DISCONTINUED | OUTPATIENT
Start: 2022-11-26 | End: 2022-12-01

## 2022-11-26 RX ORDER — OXYCODONE HYDROCHLORIDE 5 MG/1
5 TABLET ORAL ONCE
Refills: 0 | Status: DISCONTINUED | OUTPATIENT
Start: 2022-11-26 | End: 2022-11-26

## 2022-11-26 RX ADMIN — ATORVASTATIN CALCIUM 80 MILLIGRAM(S): 80 TABLET, FILM COATED ORAL at 21:43

## 2022-11-26 RX ADMIN — PANTOPRAZOLE SODIUM 40 MILLIGRAM(S): 20 TABLET, DELAYED RELEASE ORAL at 06:16

## 2022-11-26 RX ADMIN — Medication 20 MILLIEQUIVALENT(S): at 13:53

## 2022-11-26 RX ADMIN — ZOLPIDEM TARTRATE 5 MILLIGRAM(S): 10 TABLET ORAL at 21:43

## 2022-11-26 RX ADMIN — Medication 20 MILLIEQUIVALENT(S): at 16:36

## 2022-11-26 RX ADMIN — DIVALPROEX SODIUM 250 MILLIGRAM(S): 500 TABLET, DELAYED RELEASE ORAL at 05:28

## 2022-11-26 RX ADMIN — LINEZOLID 300 MILLIGRAM(S): 600 INJECTION, SOLUTION INTRAVENOUS at 16:34

## 2022-11-26 RX ADMIN — DIVALPROEX SODIUM 250 MILLIGRAM(S): 500 TABLET, DELAYED RELEASE ORAL at 17:50

## 2022-11-26 RX ADMIN — Medication 100 MILLIGRAM(S): at 05:28

## 2022-11-26 RX ADMIN — Medication 650 MILLIGRAM(S): at 16:35

## 2022-11-26 RX ADMIN — Medication 25 GRAM(S): at 10:52

## 2022-11-26 RX ADMIN — Medication 40 MILLIGRAM(S): at 05:28

## 2022-11-26 RX ADMIN — OXYCODONE HYDROCHLORIDE 5 MILLIGRAM(S): 5 TABLET ORAL at 11:01

## 2022-11-26 RX ADMIN — Medication 1: at 11:50

## 2022-11-26 RX ADMIN — Medication 81 MILLIGRAM(S): at 11:51

## 2022-11-26 RX ADMIN — OXYCODONE HYDROCHLORIDE 5 MILLIGRAM(S): 5 TABLET ORAL at 01:17

## 2022-11-26 RX ADMIN — Medication 2 UNIT(S): at 11:50

## 2022-11-26 RX ADMIN — Medication 40 MILLIGRAM(S): at 13:41

## 2022-11-26 RX ADMIN — OXYCODONE HYDROCHLORIDE 5 MILLIGRAM(S): 5 TABLET ORAL at 21:07

## 2022-11-26 RX ADMIN — OXYCODONE HYDROCHLORIDE 5 MILLIGRAM(S): 5 TABLET ORAL at 12:56

## 2022-11-26 RX ADMIN — OXYCODONE HYDROCHLORIDE 5 MILLIGRAM(S): 5 TABLET ORAL at 13:15

## 2022-11-26 RX ADMIN — POLYETHYLENE GLYCOL 3350 17 GRAM(S): 17 POWDER, FOR SOLUTION ORAL at 11:52

## 2022-11-26 RX ADMIN — OXYCODONE HYDROCHLORIDE 5 MILLIGRAM(S): 5 TABLET ORAL at 22:07

## 2022-11-26 RX ADMIN — Medication 650 MILLIGRAM(S): at 17:05

## 2022-11-26 RX ADMIN — BUDESONIDE AND FORMOTEROL FUMARATE DIHYDRATE 2 PUFF(S): 160; 4.5 AEROSOL RESPIRATORY (INHALATION) at 20:58

## 2022-11-26 RX ADMIN — LINEZOLID 300 MILLIGRAM(S): 600 INJECTION, SOLUTION INTRAVENOUS at 03:29

## 2022-11-26 RX ADMIN — SACUBITRIL AND VALSARTAN 1 TABLET(S): 24; 26 TABLET, FILM COATED ORAL at 05:28

## 2022-11-26 RX ADMIN — SPIRONOLACTONE 50 MILLIGRAM(S): 25 TABLET, FILM COATED ORAL at 05:28

## 2022-11-26 RX ADMIN — Medication 25 MILLIGRAM(S): at 01:12

## 2022-11-26 RX ADMIN — OXYCODONE HYDROCHLORIDE 5 MILLIGRAM(S): 5 TABLET ORAL at 11:53

## 2022-11-26 RX ADMIN — SACUBITRIL AND VALSARTAN 1 TABLET(S): 24; 26 TABLET, FILM COATED ORAL at 17:50

## 2022-11-26 RX ADMIN — ENOXAPARIN SODIUM 40 MILLIGRAM(S): 100 INJECTION SUBCUTANEOUS at 16:36

## 2022-11-26 NOTE — PROGRESS NOTE ADULT - ASSESSMENT
56 yo F PMHx COPD on 4 L home O2, CORNELIUS on CPAP, chronic HFrEF (19% on TTE 02/2022, life vest at home), pulmonary HTN, CAD s/p PCI to RCA, HTN, HLD, CVA with residual LLE weakness, DM II, active smoker on home O2, presented to the ED for SOB and LE and UE edema. Pt found to have CHF exacerbation as well as cellulitis.     A/P:   Acute on chronic HFrEF exacerbation:    SOB, leg edema. Pro-BNP 26K (baseline 3-4K).   Echo 3/2022: 30-35% EF, moderate MR and TR  Continue Lasix IV 40mg BID.    Continue Entresto, spironolactone, and Metoprolol.   BiPAP prn.    Left sided AICD cellulitis pocket abscess:   Bacteremia: coag negative staph,   Left Upper Extremity cellulitis:   s/p AICD extraction 11/23/22. Wound care.   s/p Rocephin and Vancomycin.  ID recommended Linezolid 600mg BID for 7 days.     Chronic hypoxemic respiratory failure 2/2 COPD on 4 L at baseline   CORNELIUS on CPAP   CPAP ordered based on prior setting, PEEP 8 and FiO2 40%  Continue Symbicort and albuterol prn, continue nasal canula.     CAD s/p PCI  stent to RCA last cath in hospital 6/21/2021  Continue Aspirin, Metoprolol and statin    Peripheral vascular disease:   Patient is c/o cold feet, feet are warm on exam with palpable pulses.   Arterial duplex showed left minimal flow within the superficial femoral artery and popliteal artery through the peroneal artery below the knee.  Vascular follow up outpatient.      History of CVA   Residual left side weakness  Continue ASA and Lipitor.      DM II  FS goal 110-180, A1C 5.6     Hypothyroidism: TSH 4.7, FT4 1.3 normal, Continue Levothyroxine.     CKD III: Stable     Suspected mood disorder  Continue divalproex 250 mg BID    DVT prophylaxis  GI prophylaxis: on Protonix  Code status: Full Code    #Progress Note Handoff:  Pending (specify): life vest.   Family discussion:  Disposition: Home with home care.

## 2022-11-27 LAB
CULTURE RESULTS: SIGNIFICANT CHANGE UP
GLUCOSE BLDC GLUCOMTR-MCNC: 102 MG/DL — HIGH (ref 70–99)
GLUCOSE BLDC GLUCOMTR-MCNC: 112 MG/DL — HIGH (ref 70–99)
GLUCOSE BLDC GLUCOMTR-MCNC: 119 MG/DL — HIGH (ref 70–99)
GLUCOSE BLDC GLUCOMTR-MCNC: 127 MG/DL — HIGH (ref 70–99)
SPECIMEN SOURCE: SIGNIFICANT CHANGE UP

## 2022-11-27 PROCEDURE — 93306 TTE W/DOPPLER COMPLETE: CPT | Mod: 26

## 2022-11-27 PROCEDURE — 99232 SBSQ HOSP IP/OBS MODERATE 35: CPT

## 2022-11-27 RX ORDER — DIPHENHYDRAMINE HCL 50 MG
25 CAPSULE ORAL ONCE
Refills: 0 | Status: COMPLETED | OUTPATIENT
Start: 2022-11-27 | End: 2022-11-27

## 2022-11-27 RX ADMIN — Medication 81 MILLIGRAM(S): at 11:33

## 2022-11-27 RX ADMIN — LORATADINE 10 MILLIGRAM(S): 10 TABLET ORAL at 06:50

## 2022-11-27 RX ADMIN — Medication 2 UNIT(S): at 17:22

## 2022-11-27 RX ADMIN — ATORVASTATIN CALCIUM 80 MILLIGRAM(S): 80 TABLET, FILM COATED ORAL at 21:41

## 2022-11-27 RX ADMIN — BUDESONIDE AND FORMOTEROL FUMARATE DIHYDRATE 2 PUFF(S): 160; 4.5 AEROSOL RESPIRATORY (INHALATION) at 21:42

## 2022-11-27 RX ADMIN — Medication 100 MILLIGRAM(S): at 05:56

## 2022-11-27 RX ADMIN — Medication 40 MILLIGRAM(S): at 05:56

## 2022-11-27 RX ADMIN — BUDESONIDE AND FORMOTEROL FUMARATE DIHYDRATE 2 PUFF(S): 160; 4.5 AEROSOL RESPIRATORY (INHALATION) at 13:08

## 2022-11-27 RX ADMIN — SACUBITRIL AND VALSARTAN 1 TABLET(S): 24; 26 TABLET, FILM COATED ORAL at 17:06

## 2022-11-27 RX ADMIN — LINEZOLID 300 MILLIGRAM(S): 600 INJECTION, SOLUTION INTRAVENOUS at 17:05

## 2022-11-27 RX ADMIN — Medication 650 MILLIGRAM(S): at 07:41

## 2022-11-27 RX ADMIN — Medication 40 MILLIGRAM(S): at 13:07

## 2022-11-27 RX ADMIN — ENOXAPARIN SODIUM 40 MILLIGRAM(S): 100 INJECTION SUBCUTANEOUS at 17:05

## 2022-11-27 RX ADMIN — LINEZOLID 300 MILLIGRAM(S): 600 INJECTION, SOLUTION INTRAVENOUS at 04:01

## 2022-11-27 RX ADMIN — Medication 25 MILLIGRAM(S): at 13:00

## 2022-11-27 RX ADMIN — SACUBITRIL AND VALSARTAN 1 TABLET(S): 24; 26 TABLET, FILM COATED ORAL at 05:57

## 2022-11-27 RX ADMIN — OXYCODONE HYDROCHLORIDE 5 MILLIGRAM(S): 5 TABLET ORAL at 06:55

## 2022-11-27 RX ADMIN — SPIRONOLACTONE 50 MILLIGRAM(S): 25 TABLET, FILM COATED ORAL at 05:56

## 2022-11-27 RX ADMIN — ZOLPIDEM TARTRATE 5 MILLIGRAM(S): 10 TABLET ORAL at 21:41

## 2022-11-27 RX ADMIN — OXYCODONE HYDROCHLORIDE 5 MILLIGRAM(S): 5 TABLET ORAL at 03:51

## 2022-11-27 RX ADMIN — OXYCODONE HYDROCHLORIDE 5 MILLIGRAM(S): 5 TABLET ORAL at 17:46

## 2022-11-27 RX ADMIN — DIVALPROEX SODIUM 250 MILLIGRAM(S): 500 TABLET, DELAYED RELEASE ORAL at 17:06

## 2022-11-27 RX ADMIN — DIVALPROEX SODIUM 250 MILLIGRAM(S): 500 TABLET, DELAYED RELEASE ORAL at 05:56

## 2022-11-27 RX ADMIN — Medication 2 UNIT(S): at 08:04

## 2022-11-27 RX ADMIN — Medication 650 MILLIGRAM(S): at 13:08

## 2022-11-27 RX ADMIN — PANTOPRAZOLE SODIUM 40 MILLIGRAM(S): 20 TABLET, DELAYED RELEASE ORAL at 05:56

## 2022-11-27 NOTE — PROGRESS NOTE ADULT - ASSESSMENT
58 yo F PMHx COPD on 4 L home O2, CORNELIUS on CPAP, chronic HFrEF (19% on TTE 02/2022, life vest at home), pulmonary HTN, CAD s/p PCI to RCA, HTN, HLD, CVA with residual LLE weakness, DM II, active smoker on home O2, presented to the ED for SOB and LE and UE edema. Pt found to have CHF exacerbation as well as cellulitis.     A/P:   Acute on chronic HFrEF exacerbation:    SOB, leg edema. Pro-BNP 26K (baseline 3-4K).   Echo 3/2022: 30-35% EF, moderate MR and TR  s/p  Lasix IV 40mg BID. Switch to Torsemide 20mg PO daily.   Continue Entresto, spironolactone, and Metoprolol.   BiPAP prn.    Left sided AICD cellulitis pocket abscess:   Bacteremia: coag negative staph,   Left Upper Extremity cellulitis:   s/p AICD extraction 11/23/22. Wound care.   s/p Rocephin and Vancomycin.  ID recommended Linezolid 600mg BID for 7 days.     Chronic hypoxemic respiratory failure 2/2 COPD on 4 L at baseline   CORNELIUS on CPAP   CPAP ordered based on prior setting, PEEP 8 and FiO2 40%  Continue Symbicort and albuterol prn, continue nasal canula.     CAD s/p PCI  stent to RCA last cath in hospital 6/21/2021  Continue Aspirin, Metoprolol and statin    Peripheral vascular disease:   Patient is c/o cold feet, feet are warm on exam with palpable pulses.   Arterial duplex showed left minimal flow within the superficial femoral artery and popliteal artery through the peroneal artery below the knee.  Vascular follow up outpatient.      History of CVA   Residual left side weakness  Continue ASA and Lipitor.      DM II  FS goal 110-180, A1C 5.6     Hypothyroidism: TSH 4.7, FT4 1.3 normal, Continue Levothyroxine.     CKD III: Stable     Suspected mood disorder  Continue divalproex 250 mg BID    DVT prophylaxis  GI prophylaxis: on Protonix  Code status: Full Code    #Progress Note Handoff:  Pending (specify): life vest.   Family discussion:  Disposition: Home with home care.

## 2022-11-27 NOTE — PROGRESS NOTE ADULT - SUBJECTIVE AND OBJECTIVE BOX
WILLIAN MARY  57y  Female      Patient is a 57y old  Female who presents with a chief complaint of CHrEF Exacerbation, and  suspected Cellulitis from AICD site. (20 Nov 2022 13:30)      INTERVAL HPI/OVERNIGHT EVENTS:  She feels ok, still with episode NSVT.   Vital Signs Last 24 Hrs  T(C): 36.5 (27 Nov 2022 05:15), Max: 36.5 (27 Nov 2022 05:15)  T(F): 97.7 (27 Nov 2022 05:15), Max: 97.7 (27 Nov 2022 05:15)  HR: 86 (27 Nov 2022 05:15) (61 - 86)  BP: 111/60 (27 Nov 2022 05:15) (89/54 - 111/60)  BP(mean): --  RR: 18 (27 Nov 2022 05:15) (18 - 18)  SpO2: 95% (27 Nov 2022 07:35) (95% - 97%)    Parameters below as of 27 Nov 2022 07:35  Patient On (Oxygen Delivery Method): nasal cannula  O2 Flow (L/min): 4        11-26-22 @ 07:01  -  11-27-22 @ 07:00  --------------------------------------------------------  IN: 1291 mL / OUT: 1500 mL / NET: -209 mL            Consultant(s) Notes Reviewed:  [x ] YES  [ ] NO          MEDICATIONS  (STANDING):  aspirin enteric coated 81 milliGRAM(s) Oral daily  atorvastatin 80 milliGRAM(s) Oral at bedtime  budesonide 160 MICROgram(s)/formoterol 4.5 MICROgram(s) Inhaler 2 Puff(s) Inhalation two times a day  divalproex  milliGRAM(s) Oral two times a day  enoxaparin Injectable 40 milliGRAM(s) SubCutaneous every 24 hours  influenza   Vaccine 0.5 milliLiter(s) IntraMuscular once  insulin lispro (ADMELOG) corrective regimen sliding scale   SubCutaneous three times a day before meals  insulin lispro Injectable (ADMELOG) 2 Unit(s) SubCutaneous three times a day before meals  linezolid  IVPB      linezolid  IVPB 600 milliGRAM(s) IV Intermittent every 12 hours  metoprolol succinate  milliGRAM(s) Oral daily  oxyCODONE    IR 5 milliGRAM(s) Oral once  pantoprazole    Tablet 40 milliGRAM(s) Oral before breakfast  polyethylene glycol 3350 17 Gram(s) Oral daily  sacubitril 49 mG/valsartan 51 mG 1 Tablet(s) Oral two times a day  spironolactone 50 milliGRAM(s) Oral daily  torsemide 20 milliGRAM(s) Oral daily  zolpidem 5 milliGRAM(s) Oral at bedtime    MEDICATIONS  (PRN):  acetaminophen     Tablet .. 650 milliGRAM(s) Oral every 6 hours PRN Temp greater or equal to 38C (100.4F), Mild Pain (1 - 3)  albuterol    90 MICROgram(s) HFA Inhaler 2 Puff(s) Inhalation every 6 hours PRN Bronchospasm  loratadine 10 milliGRAM(s) Oral daily PRN itchiness  oxyCODONE    IR 5 milliGRAM(s) Oral every 6 hours PRN Moderate Pain (4 - 6)      LABS                          10.8   5.58  )-----------( 144      ( 26 Nov 2022 06:33 )             33.2     11-26    138  |  89<L>  |  19  ----------------------------<  99  3.4<L>   |  38<H>  |  1.3    Ca    8.5      26 Nov 2022 06:33  Mg     1.7     11-26            Lactate Trend        CAPILLARY BLOOD GLUCOSE      POCT Blood Glucose.: 112 mg/dL (27 Nov 2022 12:09)      Culture - Acid Fast - Other w/Smear (collected 11-23-22 @ 10:02)  Source: .Other None  Preliminary Report (11-26-22 @ 15:05):    Culture is being performed.    Culture - Surgical Swab (collected 11-23-22 @ 10:02)  Source: .Surgical Swab None  Preliminary Report (11-24-22 @ 15:16):    No growth    Culture - Blood (collected 11-22-22 @ 21:51)  Source: .Blood Blood  Preliminary Report (11-24-22 @ 03:01):    No growth to date.    Culture - Blood (collected 11-22-22 @ 05:21)  Source: .Blood None  Preliminary Report (11-23-22 @ 15:06):    No growth to date.    Culture - Blood (collected 11-21-22 @ 07:30)  Source: .Blood Blood-Peripheral  Final Report (11-26-22 @ 23:01):    No Growth Final        RADIOLOGY & ADDITIONAL TESTS:    Imaging Personally Reviewed:  [ ] YES  [ ] NO    HEALTH ISSUES - PROBLEM Dx:          PHYSICAL EXAM:  GENERAL: NAD, well-developed.  HEAD:  Atraumatic, Normocephalic.  EYES: EOMI, PERRLA, conjunctiva and sclera clear.  NECK: Supple, No JVD.  CHEST/LUNG: bilateral ronchi  HEART: Regular rate and rhythm; S1 S2.   ABDOMEN: Soft, Nontender, Nondistended; Bowel sounds present.  EXTREMITIES:  2+ Peripheral Pulses, No clubbing, cyanosis, or edema.  PSYCH: AAOx3.  NEUROLOGY: chronic left side weakness.   SKIN: No rashes or lesions.

## 2022-11-28 LAB
ALBUMIN SERPL ELPH-MCNC: 3.5 G/DL — SIGNIFICANT CHANGE UP (ref 3.5–5.2)
ALP SERPL-CCNC: 124 U/L — HIGH (ref 30–115)
ALT FLD-CCNC: 7 U/L — SIGNIFICANT CHANGE UP (ref 0–41)
ANION GAP SERPL CALC-SCNC: 14 MMOL/L — SIGNIFICANT CHANGE UP (ref 7–14)
AST SERPL-CCNC: 24 U/L — SIGNIFICANT CHANGE UP (ref 0–41)
BASOPHILS # BLD AUTO: 0.1 K/UL — SIGNIFICANT CHANGE UP (ref 0–0.2)
BASOPHILS NFR BLD AUTO: 1.9 % — HIGH (ref 0–1)
BILIRUB SERPL-MCNC: 1.1 MG/DL — SIGNIFICANT CHANGE UP (ref 0.2–1.2)
BUN SERPL-MCNC: 20 MG/DL — SIGNIFICANT CHANGE UP (ref 10–20)
CALCIUM SERPL-MCNC: 8.9 MG/DL — SIGNIFICANT CHANGE UP (ref 8.4–10.5)
CHLORIDE SERPL-SCNC: 90 MMOL/L — LOW (ref 98–110)
CO2 SERPL-SCNC: 31 MMOL/L — SIGNIFICANT CHANGE UP (ref 17–32)
CREAT SERPL-MCNC: 1.4 MG/DL — SIGNIFICANT CHANGE UP (ref 0.7–1.5)
CULTURE RESULTS: SIGNIFICANT CHANGE UP
EGFR: 44 ML/MIN/1.73M2 — LOW
EOSINOPHIL # BLD AUTO: 0.19 K/UL — SIGNIFICANT CHANGE UP (ref 0–0.7)
EOSINOPHIL NFR BLD AUTO: 3.5 % — SIGNIFICANT CHANGE UP (ref 0–8)
GLUCOSE BLDC GLUCOMTR-MCNC: 112 MG/DL — HIGH (ref 70–99)
GLUCOSE BLDC GLUCOMTR-MCNC: 91 MG/DL — SIGNIFICANT CHANGE UP (ref 70–99)
GLUCOSE BLDC GLUCOMTR-MCNC: 91 MG/DL — SIGNIFICANT CHANGE UP (ref 70–99)
GLUCOSE BLDC GLUCOMTR-MCNC: 99 MG/DL — SIGNIFICANT CHANGE UP (ref 70–99)
GLUCOSE SERPL-MCNC: 81 MG/DL — SIGNIFICANT CHANGE UP (ref 70–99)
HCT VFR BLD CALC: 32.5 % — LOW (ref 37–47)
HGB BLD-MCNC: 10.7 G/DL — LOW (ref 12–16)
IMM GRANULOCYTES NFR BLD AUTO: 0.2 % — SIGNIFICANT CHANGE UP (ref 0.1–0.3)
LYMPHOCYTES # BLD AUTO: 1.18 K/UL — LOW (ref 1.2–3.4)
LYMPHOCYTES # BLD AUTO: 22 % — SIGNIFICANT CHANGE UP (ref 20.5–51.1)
MAGNESIUM SERPL-MCNC: 1.6 MG/DL — LOW (ref 1.8–2.4)
MCHC RBC-ENTMCNC: 29.2 PG — SIGNIFICANT CHANGE UP (ref 27–31)
MCHC RBC-ENTMCNC: 32.9 G/DL — SIGNIFICANT CHANGE UP (ref 32–37)
MCV RBC AUTO: 88.6 FL — SIGNIFICANT CHANGE UP (ref 81–99)
MONOCYTES # BLD AUTO: 0.74 K/UL — HIGH (ref 0.1–0.6)
MONOCYTES NFR BLD AUTO: 13.8 % — HIGH (ref 1.7–9.3)
NEUTROPHILS # BLD AUTO: 3.14 K/UL — SIGNIFICANT CHANGE UP (ref 1.4–6.5)
NEUTROPHILS NFR BLD AUTO: 58.6 % — SIGNIFICANT CHANGE UP (ref 42.2–75.2)
NRBC # BLD: 0 /100 WBCS — SIGNIFICANT CHANGE UP (ref 0–0)
PLATELET # BLD AUTO: 163 K/UL — SIGNIFICANT CHANGE UP (ref 130–400)
POTASSIUM SERPL-MCNC: 4.7 MMOL/L — SIGNIFICANT CHANGE UP (ref 3.5–5)
POTASSIUM SERPL-SCNC: 4.7 MMOL/L — SIGNIFICANT CHANGE UP (ref 3.5–5)
PROT SERPL-MCNC: 6.3 G/DL — SIGNIFICANT CHANGE UP (ref 6–8)
RBC # BLD: 3.67 M/UL — LOW (ref 4.2–5.4)
RBC # FLD: 17.8 % — HIGH (ref 11.5–14.5)
SODIUM SERPL-SCNC: 135 MMOL/L — SIGNIFICANT CHANGE UP (ref 135–146)
SPECIMEN SOURCE: SIGNIFICANT CHANGE UP
SURGICAL PATHOLOGY STUDY: SIGNIFICANT CHANGE UP
WBC # BLD: 5.36 K/UL — SIGNIFICANT CHANGE UP (ref 4.8–10.8)
WBC # FLD AUTO: 5.36 K/UL — SIGNIFICANT CHANGE UP (ref 4.8–10.8)

## 2022-11-28 PROCEDURE — 99232 SBSQ HOSP IP/OBS MODERATE 35: CPT

## 2022-11-28 RX ORDER — DIPHENHYDRAMINE HCL 50 MG
25 CAPSULE ORAL ONCE
Refills: 0 | Status: COMPLETED | OUTPATIENT
Start: 2022-11-28 | End: 2022-11-28

## 2022-11-28 RX ORDER — MAGNESIUM SULFATE 500 MG/ML
2 VIAL (ML) INJECTION
Refills: 0 | Status: COMPLETED | OUTPATIENT
Start: 2022-11-28 | End: 2022-11-28

## 2022-11-28 RX ADMIN — OXYCODONE HYDROCHLORIDE 5 MILLIGRAM(S): 5 TABLET ORAL at 01:03

## 2022-11-28 RX ADMIN — SPIRONOLACTONE 50 MILLIGRAM(S): 25 TABLET, FILM COATED ORAL at 05:25

## 2022-11-28 RX ADMIN — Medication 20 MILLIGRAM(S): at 05:25

## 2022-11-28 RX ADMIN — SACUBITRIL AND VALSARTAN 1 TABLET(S): 24; 26 TABLET, FILM COATED ORAL at 17:16

## 2022-11-28 RX ADMIN — OXYCODONE HYDROCHLORIDE 5 MILLIGRAM(S): 5 TABLET ORAL at 16:10

## 2022-11-28 RX ADMIN — Medication 2 UNIT(S): at 08:29

## 2022-11-28 RX ADMIN — BUDESONIDE AND FORMOTEROL FUMARATE DIHYDRATE 2 PUFF(S): 160; 4.5 AEROSOL RESPIRATORY (INHALATION) at 20:55

## 2022-11-28 RX ADMIN — LINEZOLID 300 MILLIGRAM(S): 600 INJECTION, SOLUTION INTRAVENOUS at 17:17

## 2022-11-28 RX ADMIN — LINEZOLID 300 MILLIGRAM(S): 600 INJECTION, SOLUTION INTRAVENOUS at 04:34

## 2022-11-28 RX ADMIN — OXYCODONE HYDROCHLORIDE 5 MILLIGRAM(S): 5 TABLET ORAL at 17:13

## 2022-11-28 RX ADMIN — BUDESONIDE AND FORMOTEROL FUMARATE DIHYDRATE 2 PUFF(S): 160; 4.5 AEROSOL RESPIRATORY (INHALATION) at 08:30

## 2022-11-28 RX ADMIN — PANTOPRAZOLE SODIUM 40 MILLIGRAM(S): 20 TABLET, DELAYED RELEASE ORAL at 05:25

## 2022-11-28 RX ADMIN — OXYCODONE HYDROCHLORIDE 5 MILLIGRAM(S): 5 TABLET ORAL at 02:03

## 2022-11-28 RX ADMIN — LORATADINE 10 MILLIGRAM(S): 10 TABLET ORAL at 05:25

## 2022-11-28 RX ADMIN — Medication 100 MILLIGRAM(S): at 05:26

## 2022-11-28 RX ADMIN — ATORVASTATIN CALCIUM 80 MILLIGRAM(S): 80 TABLET, FILM COATED ORAL at 22:15

## 2022-11-28 RX ADMIN — ZOLPIDEM TARTRATE 5 MILLIGRAM(S): 10 TABLET ORAL at 22:15

## 2022-11-28 RX ADMIN — Medication 2 UNIT(S): at 11:47

## 2022-11-28 RX ADMIN — SACUBITRIL AND VALSARTAN 1 TABLET(S): 24; 26 TABLET, FILM COATED ORAL at 05:25

## 2022-11-28 RX ADMIN — Medication 25 MILLIGRAM(S): at 17:52

## 2022-11-28 RX ADMIN — POLYETHYLENE GLYCOL 3350 17 GRAM(S): 17 POWDER, FOR SOLUTION ORAL at 13:01

## 2022-11-28 RX ADMIN — DIVALPROEX SODIUM 250 MILLIGRAM(S): 500 TABLET, DELAYED RELEASE ORAL at 05:26

## 2022-11-28 RX ADMIN — OXYCODONE HYDROCHLORIDE 5 MILLIGRAM(S): 5 TABLET ORAL at 13:03

## 2022-11-28 RX ADMIN — Medication 2 UNIT(S): at 17:13

## 2022-11-28 RX ADMIN — Medication 25 GRAM(S): at 16:09

## 2022-11-28 RX ADMIN — ENOXAPARIN SODIUM 40 MILLIGRAM(S): 100 INJECTION SUBCUTANEOUS at 17:15

## 2022-11-28 RX ADMIN — OXYCODONE HYDROCHLORIDE 5 MILLIGRAM(S): 5 TABLET ORAL at 08:33

## 2022-11-28 RX ADMIN — Medication 81 MILLIGRAM(S): at 13:00

## 2022-11-28 RX ADMIN — DIVALPROEX SODIUM 250 MILLIGRAM(S): 500 TABLET, DELAYED RELEASE ORAL at 17:15

## 2022-11-28 NOTE — PROGRESS NOTE ADULT - SUBJECTIVE AND OBJECTIVE BOX
WILLIAN MARY  57y  Female      Patient is a 57y old  Female who presents with a chief complaint of CHrEF Exacerbation, and  [facility]. (20 Nov 2022 13:30)      INTERVAL HPI/OVERNIGHT EVENTS:  She feels ok, no new complaints.   Vital Signs Last 24 Hrs  T(C): 36.7 (28 Nov 2022 05:34), Max: 36.7 (28 Nov 2022 05:34)  T(F): 98.1 (28 Nov 2022 05:34), Max: 98.1 (28 Nov 2022 05:34)  HR: 65 (28 Nov 2022 05:34) (64 - 65)  BP: 111/65 (28 Nov 2022 05:34) (94/50 - 111/65)  BP(mean): --  RR: 18 (28 Nov 2022 05:34) (18 - 18)  SpO2: 98% (28 Nov 2022 05:34) (98% - 98%)    Parameters below as of 28 Nov 2022 05:34  Patient On (Oxygen Delivery Method): nasal cannula  O2 Flow (L/min): 4        11-27-22 @ 07:01  -  11-28-22 @ 07:00  --------------------------------------------------------  IN: 1812 mL / OUT: 0 mL / NET: 1812 mL    11-28-22 @ 07:01  -  11-28-22 @ 15:59  --------------------------------------------------------  IN: 826 mL / OUT: 1600 mL / NET: -774 mL            Consultant(s) Notes Reviewed:  [x ] YES  [ ] NO          MEDICATIONS  (STANDING):  aspirin enteric coated 81 milliGRAM(s) Oral daily  atorvastatin 80 milliGRAM(s) Oral at bedtime  budesonide 160 MICROgram(s)/formoterol 4.5 MICROgram(s) Inhaler 2 Puff(s) Inhalation two times a day  divalproex  milliGRAM(s) Oral two times a day  enoxaparin Injectable 40 milliGRAM(s) SubCutaneous every 24 hours  influenza   Vaccine 0.5 milliLiter(s) IntraMuscular once  insulin lispro (ADMELOG) corrective regimen sliding scale   SubCutaneous three times a day before meals  insulin lispro Injectable (ADMELOG) 2 Unit(s) SubCutaneous three times a day before meals  linezolid  IVPB      linezolid  IVPB 600 milliGRAM(s) IV Intermittent every 12 hours  magnesium sulfate  IVPB 2 Gram(s) IV Intermittent every 2 hours  metoprolol succinate  milliGRAM(s) Oral daily  oxyCODONE    IR 5 milliGRAM(s) Oral once  pantoprazole    Tablet 40 milliGRAM(s) Oral before breakfast  polyethylene glycol 3350 17 Gram(s) Oral daily  sacubitril 49 mG/valsartan 51 mG 1 Tablet(s) Oral two times a day  spironolactone 50 milliGRAM(s) Oral daily  torsemide 20 milliGRAM(s) Oral daily  zolpidem 5 milliGRAM(s) Oral at bedtime    MEDICATIONS  (PRN):  acetaminophen     Tablet .. 650 milliGRAM(s) Oral every 6 hours PRN Temp greater or equal to 38C (100.4F), Mild Pain (1 - 3)  albuterol    90 MICROgram(s) HFA Inhaler 2 Puff(s) Inhalation every 6 hours PRN Bronchospasm  loratadine 10 milliGRAM(s) Oral daily PRN itchiness  oxyCODONE    IR 5 milliGRAM(s) Oral every 6 hours PRN Moderate Pain (4 - 6)      LABS                          10.7   5.36  )-----------( 163      ( 28 Nov 2022 11:42 )             32.5     11-28    135  |  90<L>  |  20  ----------------------------<  81  4.7   |  31  |  1.4    Ca    8.9      28 Nov 2022 11:42  Mg     1.6     11-28    TPro  6.3  /  Alb  3.5  /  TBili  1.1  /  DBili  x   /  AST  24  /  ALT  7   /  AlkPhos  124<H>  11-28          Lactate Trend        CAPILLARY BLOOD GLUCOSE      POCT Blood Glucose.: 91 mg/dL (28 Nov 2022 11:47)      Culture - Acid Fast - Other w/Smear (collected 11-23-22 @ 10:02)  Source: .Other None  Preliminary Report (11-26-22 @ 15:05):    Culture is being performed.    Culture - Surgical Swab (collected 11-23-22 @ 10:02)  Source: .Surgical Swab None  Preliminary Report (11-24-22 @ 15:16):    No growth    Culture - Blood (collected 11-22-22 @ 21:51)  Source: .Blood Blood  Final Report (11-28-22 @ 03:01):    No Growth Final    Culture - Blood (collected 11-22-22 @ 05:21)  Source: .Blood None  Final Report (11-27-22 @ 15:00):    No Growth Final        RADIOLOGY & ADDITIONAL TESTS:    Imaging Personally Reviewed:  [ ] YES  [ ] NO    HEALTH ISSUES - PROBLEM Dx:          PHYSICAL EXAM:  GENERAL: NAD, well-developed.  HEAD:  Atraumatic, Normocephalic.  EYES: EOMI, PERRLA, conjunctiva and sclera clear.  NECK: Supple, No JVD.  CHEST/LUNG: bilateral ronchi  HEART: Regular rate and rhythm; S1 S2.   ABDOMEN: Soft, Nontender, Nondistended; Bowel sounds present.  EXTREMITIES:  2+ Peripheral Pulses, No clubbing, cyanosis, or edema.  PSYCH: AAOx3.  NEUROLOGY: chronic left side weakness.   SKIN: No rashes or lesions.

## 2022-11-28 NOTE — CHART NOTE - NSCHARTNOTEFT_GEN_A_CORE
Wound site checked and noted to have induration which was similar since sx. Continue ABX therapy. Will need follow-up appt wit Dr. Snider's office to remove stitches. Thank you

## 2022-11-28 NOTE — PROGRESS NOTE ADULT - SUBJECTIVE AND OBJECTIVE BOX
24H events:    Patient is a 57y old Female who presents with a chief complaint of CHrEF Exacerbation, and  suspected Cellulitis from [facility] site. (20 Nov 2022 13:30)    Primary diagnosis of CHF exacerbation       Today is hospital day 9d. This morning patient was seen and examined at bedside, resting comfortably in bed.      PAST MEDICAL & SURGICAL HISTORY  Hypertension    Hyperlipidemia    Anxiety and depression    COPD, severe    CHF (congestive heart failure)    Cerebrovascular accident (CVA)  Multiple    Type 2 diabetes mellitus    CKD (chronic kidney disease), stage II    No significant past surgical history      SOCIAL HISTORY:  Social History:      ALLERGIES:  No Known Allergies    MEDICATIONS:  STANDING MEDICATIONS  aspirin enteric coated 81 milliGRAM(s) Oral daily  atorvastatin 80 milliGRAM(s) Oral at bedtime  budesonide 160 MICROgram(s)/formoterol 4.5 MICROgram(s) Inhaler 2 Puff(s) Inhalation two times a day  divalproex  milliGRAM(s) Oral two times a day  enoxaparin Injectable 40 milliGRAM(s) SubCutaneous every 24 hours  influenza   Vaccine 0.5 milliLiter(s) IntraMuscular once  insulin lispro (ADMELOG) corrective regimen sliding scale   SubCutaneous three times a day before meals  insulin lispro Injectable (ADMELOG) 2 Unit(s) SubCutaneous three times a day before meals  linezolid  IVPB      linezolid  IVPB 600 milliGRAM(s) IV Intermittent every 12 hours  metoprolol succinate  milliGRAM(s) Oral daily  oxyCODONE    IR 5 milliGRAM(s) Oral once  pantoprazole    Tablet 40 milliGRAM(s) Oral before breakfast  polyethylene glycol 3350 17 Gram(s) Oral daily  sacubitril 49 mG/valsartan 51 mG 1 Tablet(s) Oral two times a day  spironolactone 50 milliGRAM(s) Oral daily  torsemide 20 milliGRAM(s) Oral daily  zolpidem 5 milliGRAM(s) Oral at bedtime    PRN MEDICATIONS  acetaminophen     Tablet .. 650 milliGRAM(s) Oral every 6 hours PRN  albuterol    90 MICROgram(s) HFA Inhaler 2 Puff(s) Inhalation every 6 hours PRN  loratadine 10 milliGRAM(s) Oral daily PRN  oxyCODONE    IR 5 milliGRAM(s) Oral every 6 hours PRN        LABS:                        10.7   5.36  )-----------( 163      ( 28 Nov 2022 11:42 )             32.5       RADIOLOGY:    VITALS:   T(F): 98.1  HR: 65  BP: 111/65  RR: 18  SpO2: 98%    PHYSICAL EXAM:    GENERAL: NAD, well-groomed, well-developed  HEAD:  Atraumatic, Normocephalic  EYES: EOMI  NECK: Supple  NERVOUS SYSTEM:  Alert & Oriented X3, motor 5/5 b/l upper and lower ext, sensation intact  CHEST/LUNG: Poor inspiratory effort  HEART: Regular rate and rhythm; No murmurs, rubs, or gallops  ABDOMEN: Soft, Nontender, Nondistended; Bowel sounds present  EXTREMITIES:  Warm, no pulse on palpation, No pitting edema  LYMPH: No lymphadenopathy noted  SKIN: No rashes or lesions

## 2022-11-28 NOTE — PROGRESS NOTE ADULT - ASSESSMENT
57 year old female with PMH of COPD on 4 L home O2, CORNELIUS on CPAP, HFrEF (19% on TTE 02/2022, s/p ICD, pulmonary HTN, CAD s/p PCI to RCA, HTN, HLD, CVA with residual LLE weakness, DM, active smoker on home O2, presented to the ED for SOB and LE and UE edema      # Cellulitis, infection of left sided AICD pocket site  # Bacteremia, blood culture growing Strep spp. and Coag negative Staph  - s/p Cefepime ceftriaxone and vancomycin Currently on Linezolid for total of 7 days  - Following Blood cultures so far negative  - S/p explantation on 11/23, Surgical swab Cx negative so far  - YOKASTA cancelled, patient is high risk for YOKASTA and might end up intubated    # Acute HFrEF exacerbation   - previous TTE March 2022: 30-35% EF, moderate MR and TR  - s/p IV Lasix, currently on Toresmide 20mg QD  - on Entresto and spironolactone  - on Toprol 100 mg daily  - PT saw her -> recommended STR  - Life vest pending    #LUE swelling  - Duplex Arterial -> No high grade stenosis  - Duplex Venous -> No DVT    # Bilateral LE swelling  - likely 2/2 CHF  - Negative LE duplex  - Was complaining of cold feet, Duplex arterial showed severe PAD and vascular were consulted -> outpatient f/u with vascular    # Hypomagnesemia  - Replete as needed    # Chronic hypoxemic respiratory failure 2/2 COPD on 4 L at baseline   # CORNELIUS on CPAP   - CPAP ordered based on prior setting, PEEP 8 and FiO2 40%, but patient no compliant  - c/w Symbicort and Albuterol prn  - c/w oxygen supplementation    # CAD   - s/p PCI+stent to RCA last cath in hospital June 2021  - c/w aspirin and statin    # Elevated troponin  - likely from demand of CHF exacerbation     # Hx of CVA with residual LE weakness  - currently wheelchair bound   - c/w aspirin, statin     # DM II  - Low fasting sugar, Lantus held  - On sliding scale and standing pre-meals    # Hypothyroidism   - c/w levothyroxine    # CKD stage 2, stable   -  monitor BMP    # Suspected seizure disorder vs. mood disorder  - divalproex picked up on sure scripts   - c/w divalproex 250 mg BID    # DVT ppx: Lovenox resumed  # GI ppx: Pantoprazole  # Code status - Full Code  Handoff: Pending Life vest

## 2022-11-29 ENCOUNTER — TRANSCRIPTION ENCOUNTER (OUTPATIENT)
Age: 57
End: 2022-11-29

## 2022-11-29 LAB
ALBUMIN SERPL ELPH-MCNC: 3.2 G/DL — LOW (ref 3.5–5.2)
ALP SERPL-CCNC: 124 U/L — HIGH (ref 30–115)
ALT FLD-CCNC: 7 U/L — SIGNIFICANT CHANGE UP (ref 0–41)
ANION GAP SERPL CALC-SCNC: 12 MMOL/L — SIGNIFICANT CHANGE UP (ref 7–14)
ANISOCYTOSIS BLD QL: SIGNIFICANT CHANGE UP
AST SERPL-CCNC: 20 U/L — SIGNIFICANT CHANGE UP (ref 0–41)
BASOPHILS # BLD AUTO: 0.09 K/UL — SIGNIFICANT CHANGE UP (ref 0–0.2)
BASOPHILS NFR BLD AUTO: 1.7 % — HIGH (ref 0–1)
BILIRUB SERPL-MCNC: 1.3 MG/DL — HIGH (ref 0.2–1.2)
BUN SERPL-MCNC: 18 MG/DL — SIGNIFICANT CHANGE UP (ref 10–20)
CALCIUM SERPL-MCNC: 8.8 MG/DL — SIGNIFICANT CHANGE UP (ref 8.4–10.5)
CHLORIDE SERPL-SCNC: 88 MMOL/L — LOW (ref 98–110)
CO2 SERPL-SCNC: 35 MMOL/L — HIGH (ref 17–32)
CREAT SERPL-MCNC: 1.3 MG/DL — SIGNIFICANT CHANGE UP (ref 0.7–1.5)
EGFR: 49 ML/MIN/1.73M2 — LOW
EOSINOPHIL # BLD AUTO: 0.27 K/UL — SIGNIFICANT CHANGE UP (ref 0–0.7)
EOSINOPHIL NFR BLD AUTO: 5.3 % — SIGNIFICANT CHANGE UP (ref 0–8)
GIANT PLATELETS BLD QL SMEAR: PRESENT — SIGNIFICANT CHANGE UP
GLUCOSE BLDC GLUCOMTR-MCNC: 111 MG/DL — HIGH (ref 70–99)
GLUCOSE BLDC GLUCOMTR-MCNC: 113 MG/DL — HIGH (ref 70–99)
GLUCOSE BLDC GLUCOMTR-MCNC: 121 MG/DL — HIGH (ref 70–99)
GLUCOSE BLDC GLUCOMTR-MCNC: 145 MG/DL — HIGH (ref 70–99)
GLUCOSE SERPL-MCNC: 117 MG/DL — HIGH (ref 70–99)
HCT VFR BLD CALC: 34 % — LOW (ref 37–47)
HGB BLD-MCNC: 10.8 G/DL — LOW (ref 12–16)
LYMPHOCYTES # BLD AUTO: 0.58 K/UL — LOW (ref 1.2–3.4)
LYMPHOCYTES # BLD AUTO: 11.4 % — LOW (ref 20.5–51.1)
MACROCYTES BLD QL: SLIGHT — SIGNIFICANT CHANGE UP
MAGNESIUM SERPL-MCNC: 1.9 MG/DL — SIGNIFICANT CHANGE UP (ref 1.8–2.4)
MANUAL SMEAR VERIFICATION: SIGNIFICANT CHANGE UP
MCHC RBC-ENTMCNC: 28.6 PG — SIGNIFICANT CHANGE UP (ref 27–31)
MCHC RBC-ENTMCNC: 31.8 G/DL — LOW (ref 32–37)
MCV RBC AUTO: 90.2 FL — SIGNIFICANT CHANGE UP (ref 81–99)
MONOCYTES # BLD AUTO: 0.67 K/UL — HIGH (ref 0.1–0.6)
MONOCYTES NFR BLD AUTO: 13.2 % — HIGH (ref 1.7–9.3)
NEUTROPHILS # BLD AUTO: 3.32 K/UL — SIGNIFICANT CHANGE UP (ref 1.4–6.5)
NEUTROPHILS NFR BLD AUTO: 65.8 % — SIGNIFICANT CHANGE UP (ref 42.2–75.2)
PLAT MORPH BLD: ABNORMAL
PLATELET # BLD AUTO: 186 K/UL — SIGNIFICANT CHANGE UP (ref 130–400)
POLYCHROMASIA BLD QL SMEAR: SLIGHT — SIGNIFICANT CHANGE UP
POTASSIUM SERPL-MCNC: 4.3 MMOL/L — SIGNIFICANT CHANGE UP (ref 3.5–5)
POTASSIUM SERPL-SCNC: 4.3 MMOL/L — SIGNIFICANT CHANGE UP (ref 3.5–5)
PROT SERPL-MCNC: 6.1 G/DL — SIGNIFICANT CHANGE UP (ref 6–8)
RBC # BLD: 3.77 M/UL — LOW (ref 4.2–5.4)
RBC # FLD: 17.8 % — HIGH (ref 11.5–14.5)
RBC BLD AUTO: ABNORMAL
SODIUM SERPL-SCNC: 135 MMOL/L — SIGNIFICANT CHANGE UP (ref 135–146)
VARIANT LYMPHS # BLD: 2.6 % — SIGNIFICANT CHANGE UP (ref 0–5)
WBC # BLD: 5.05 K/UL — SIGNIFICANT CHANGE UP (ref 4.8–10.8)
WBC # FLD AUTO: 5.05 K/UL — SIGNIFICANT CHANGE UP (ref 4.8–10.8)

## 2022-11-29 PROCEDURE — 99232 SBSQ HOSP IP/OBS MODERATE 35: CPT

## 2022-11-29 RX ORDER — DIVALPROEX SODIUM 500 MG/1
1 TABLET, DELAYED RELEASE ORAL
Qty: 0 | Refills: 0 | DISCHARGE
Start: 2022-11-29

## 2022-11-29 RX ORDER — MAGNESIUM SULFATE 500 MG/ML
2 VIAL (ML) INJECTION ONCE
Refills: 0 | Status: COMPLETED | OUTPATIENT
Start: 2022-11-29 | End: 2022-11-29

## 2022-11-29 RX ORDER — LINEZOLID 600 MG/300ML
1 INJECTION, SOLUTION INTRAVENOUS
Qty: 6 | Refills: 0
Start: 2022-11-29 | End: 2022-12-01

## 2022-11-29 RX ADMIN — PANTOPRAZOLE SODIUM 40 MILLIGRAM(S): 20 TABLET, DELAYED RELEASE ORAL at 05:33

## 2022-11-29 RX ADMIN — BUDESONIDE AND FORMOTEROL FUMARATE DIHYDRATE 2 PUFF(S): 160; 4.5 AEROSOL RESPIRATORY (INHALATION) at 08:15

## 2022-11-29 RX ADMIN — LINEZOLID 300 MILLIGRAM(S): 600 INJECTION, SOLUTION INTRAVENOUS at 03:13

## 2022-11-29 RX ADMIN — POLYETHYLENE GLYCOL 3350 17 GRAM(S): 17 POWDER, FOR SOLUTION ORAL at 11:50

## 2022-11-29 RX ADMIN — Medication 650 MILLIGRAM(S): at 09:00

## 2022-11-29 RX ADMIN — OXYCODONE HYDROCHLORIDE 5 MILLIGRAM(S): 5 TABLET ORAL at 06:34

## 2022-11-29 RX ADMIN — Medication 2 UNIT(S): at 07:55

## 2022-11-29 RX ADMIN — Medication 2 UNIT(S): at 17:24

## 2022-11-29 RX ADMIN — ENOXAPARIN SODIUM 40 MILLIGRAM(S): 100 INJECTION SUBCUTANEOUS at 17:33

## 2022-11-29 RX ADMIN — Medication 2 UNIT(S): at 11:50

## 2022-11-29 RX ADMIN — Medication 650 MILLIGRAM(S): at 08:15

## 2022-11-29 RX ADMIN — LORATADINE 10 MILLIGRAM(S): 10 TABLET ORAL at 05:34

## 2022-11-29 RX ADMIN — BUDESONIDE AND FORMOTEROL FUMARATE DIHYDRATE 2 PUFF(S): 160; 4.5 AEROSOL RESPIRATORY (INHALATION) at 21:46

## 2022-11-29 RX ADMIN — OXYCODONE HYDROCHLORIDE 5 MILLIGRAM(S): 5 TABLET ORAL at 17:33

## 2022-11-29 RX ADMIN — DIVALPROEX SODIUM 250 MILLIGRAM(S): 500 TABLET, DELAYED RELEASE ORAL at 05:33

## 2022-11-29 RX ADMIN — Medication 650 MILLIGRAM(S): at 22:25

## 2022-11-29 RX ADMIN — SACUBITRIL AND VALSARTAN 1 TABLET(S): 24; 26 TABLET, FILM COATED ORAL at 05:33

## 2022-11-29 RX ADMIN — Medication 20 MILLIGRAM(S): at 05:33

## 2022-11-29 RX ADMIN — DIVALPROEX SODIUM 250 MILLIGRAM(S): 500 TABLET, DELAYED RELEASE ORAL at 17:33

## 2022-11-29 RX ADMIN — ZOLPIDEM TARTRATE 5 MILLIGRAM(S): 10 TABLET ORAL at 21:19

## 2022-11-29 RX ADMIN — SPIRONOLACTONE 50 MILLIGRAM(S): 25 TABLET, FILM COATED ORAL at 05:33

## 2022-11-29 RX ADMIN — LINEZOLID 300 MILLIGRAM(S): 600 INJECTION, SOLUTION INTRAVENOUS at 21:46

## 2022-11-29 RX ADMIN — Medication 650 MILLIGRAM(S): at 21:18

## 2022-11-29 RX ADMIN — SACUBITRIL AND VALSARTAN 1 TABLET(S): 24; 26 TABLET, FILM COATED ORAL at 17:33

## 2022-11-29 RX ADMIN — Medication 25 GRAM(S): at 09:17

## 2022-11-29 RX ADMIN — OXYCODONE HYDROCHLORIDE 5 MILLIGRAM(S): 5 TABLET ORAL at 05:34

## 2022-11-29 RX ADMIN — Medication 100 MILLIGRAM(S): at 05:33

## 2022-11-29 RX ADMIN — ATORVASTATIN CALCIUM 80 MILLIGRAM(S): 80 TABLET, FILM COATED ORAL at 21:19

## 2022-11-29 RX ADMIN — OXYCODONE HYDROCHLORIDE 5 MILLIGRAM(S): 5 TABLET ORAL at 17:00

## 2022-11-29 RX ADMIN — Medication 81 MILLIGRAM(S): at 11:50

## 2022-11-29 NOTE — DISCHARGE NOTE PROVIDER - NSDCFUADDINST_GEN_ALL_CORE_FT
Please keep surgical site clean, dry. Apply dressing daily. Do not pick or scratch incision site.  Please keep surgical site clean, dry. Apply dressing daily. Do not pick or scratch incision site.   Please follow up with your primary care physician regarding valproic acid

## 2022-11-29 NOTE — DISCHARGE NOTE PROVIDER - CARE PROVIDER_API CALL
Aixa Charles  INTERNAL MEDICINE  [facility] [place] [address]  Phone: [phone]  Fax: [phone]  Established Patient  Follow Up Time: 1 week    Jan Rhoades)  Surgery; Vascular Surgery  [address] [place] [address]  Phone: [phone]  Fax: [phone]  Established Patient  Follow Up Time: 2 weeks   Aixa Charles  INTERNAL MEDICINE  [address] [place] [address]  Phone: [phone]  Fax: [phone]  Established Patient  Follow Up Time: 1 week    Jan Rhoades)  Surgery; Vascular Surgery  [address] [place] [address]  Phone: [phone]  Fax: [phone]  Established Patient  Follow Up Time: 2 weeks    Pardeep Soto)  Cardiac Electrophysiology; Cardiology; Internal Medicine  [address] [place] [address]  Phone: [phone]  Fax: [phone]  Established Patient  Follow Up Time: 1 week   Aixa Charles  INTERNAL MEDICINE  [address] [place] [address]  Phone: [phone]  Fax: [phone]  Established Patient  Follow Up Time: 1 week    Jan Rhoades)  Surgery; Vascular Surgery  [address] [place] [address]  Phone: [phone]  Fax: [phone]  Established Patient  Follow Up Time: 2 weeks    Pardeep Soto)  Cardiac Electrophysiology; Cardiology; Internal Medicine  [address] [place] [address]  Phone: [phone]  Fax: [phone]  Established Patient  Follow Up Time: 1 week    Pedro Snider)  Surgery; Thoracic and Cardiac Surgery  [address] [place] [address], [address]  [place] [address]  Phone: [phone]  Fax: [phone]  Follow Up Time: 1 week    Joel Gaspar; MD)  Adv Heart Fail Trnsplnt Cardio; Cardiovascular Disease; Internal Medicine  [address] [place] [address], [address] [place] [address]  Phone: [phone]  Fax: [phone]  Established Patient  Follow Up Time: 2 weeks   Aixa Charles  INTERNAL MEDICINE  [address] [place] [address]  Phone: [phone]  Fax: [phone]  Established Patient  Follow Up Time: 1 week    Jan Rhoades)  Surgery; Vascular Surgery  [address] [place] [address]  Phone: [phone]  Fax: [phone]  Established Patient  Follow Up Time: 2 weeks    Pardeep Soto)  Cardiac Electrophysiology; Cardiology; Internal Medicine  [address] [place] [address]  Phone: [phone]  Fax: [phone]  Established Patient  Follow Up Time: 1 week    Pedro Snider)  Surgery; Thoracic and Cardiac Surgery  [address] [place] [address], [address]  [place] [address]  Phone: [phone]  Fax: [phone]  Follow Up Time: 1 week    Joel Gaspar; MD)  Adv Heart Fail Trnsplnt Cardio; Cardiovascular Disease; Internal Medicine  [address] [place] [address], [address] [place] [address]  Phone: [phone]  Fax: [phone]  Established Patient  Follow Up Time: 2 weeks    [facility]elle   Phone: [phone]  Fax: (   )    -  Follow Up Time: 2 weeks

## 2022-11-29 NOTE — DISCHARGE NOTE PROVIDER - NSDCCPCAREPLAN_GEN_ALL_CORE_FT
PRINCIPAL DISCHARGE DIAGNOSIS  Diagnosis: CHF exacerbation  Assessment and Plan of Treatment: Shortness of breath  Shortness of breath (dyspnea) means you have trouble breathing and could indicate a medical problem. Causes include lung disease, heart disease, low amount of red blood cells (anemia), poor physical fitness, being overweight, smoking, etc. Your health care provider today may not be able to find a cause for your shortness of breath after your exam. In this case, it is important to have a follow-up exam with your primary care physician as instructed. If medicines were prescribed, take them as directed for the full length of time directed. Refrain from tobacco products.  In your case the shortness of breath was due to excacerbation of your heart failure which was treated with IV diuretics.  SEEK IMMEDIATE MEDICAL CARE IF YOU HAVE ANY OF THE FOLLOWING SYMPTOMS: worsening shortness of breath, chest pain, back pain, abdominal pain, fever, coughing up blood, lightheadedness/dizziness.        SECONDARY DISCHARGE DIAGNOSES  Diagnosis: AICD generator infection  Assessment and Plan of Treatment: Your had an infection at the AICD site and for that reason it was removed by the Cardiothoracic Surgery team. We reccomended that you put a Life vest to shock you in case you develop a shockable arrythmia. But you refused to put it on and the fatal risks of that was explained to you.   It is important to continue the full course of the antibiotic (Linezolid)     PRINCIPAL DISCHARGE DIAGNOSIS  Diagnosis: CHF exacerbation  Assessment and Plan of Treatment: Shortness of breath  Shortness of breath (dyspnea) means you have trouble breathing and could indicate a medical problem. Causes include lung disease, heart disease, low amount of red blood cells (anemia), poor physical fitness, being overweight, smoking, etc. Your health care provider today may not be able to find a cause for your shortness of breath after your exam. In this case, it is important to have a follow-up exam with your primary care physician as instructed. If medicines were prescribed, take them as directed for the full length of time directed. Refrain from tobacco products.  In your case the shortness of breath was due to excacerbation of your heart failure which was treated with IV diuretics.  SEEK IMMEDIATE MEDICAL CARE IF YOU HAVE ANY OF THE FOLLOWING SYMPTOMS: worsening shortness of breath, chest pain, back pain, abdominal pain, fever, coughing up blood, lightheadedness/dizziness.        SECONDARY DISCHARGE DIAGNOSES  Diagnosis: AICD generator infection  Assessment and Plan of Treatment: Your had an infection at the AICD site and for that reason it was removed by the Cardiothoracic Surgery team. It is important to keep the Life vest on.  It is important to continue the full course of the antibiotic (Linezolid)

## 2022-11-29 NOTE — DISCHARGE NOTE PROVIDER - HOSPITAL COURSE
57 year old female with PMH of COPD on 4 L home O2, CORNELIUS on CPAP, HFrEF (19% on TTE 02/2022, s/p ICD, pulmonary HTN, CAD s/p PCI to RCA, HTN, HLD, CVA with residual LLE weakness, DM, active smoker on home O2, presented to the ED for SOB and LE and UE edema. She was admitted for CHF exacerbation, started on IV Lasix then tapered down to her home dose of Torsemide. She had cellulitis at ICD site, BCx positive for Strep and staph s/p Cefepime, ceftriaxone, vancomycin. ID were onboard and recommended Linezolid for a total of 7 days (started on Nov 25, 2022). Following BCx were negative. Patient under went explantation of the ICD on 11/23/2022, surgical swab came back with positive prelim results of Enterococcus fecalis. YOKASTA was deferred because patient is at high risk of getting intubated during the procedure. Post explantation patient was having NSVTs and Life vest was recommended but she refused even after explaining to her the risk of dying without the life vest.  During admission she had cold feet, LE arterial duplex showed severe PAD requiring outpatient follow up with vascular. LE venous duplex was negative.  Also she had a LUE swelling but US duplex was negative.    57 year old female with PMH of COPD on 4 L home O2, CORNELIUS on CPAP, HFrEF (19% on TTE 02/2022, s/p ICD, pulmonary HTN, CAD s/p PCI to RCA, HTN, HLD, CVA with residual LLE weakness, DM, active smoker on home O2, presented to the ED for SOB and LE and UE edema. She was admitted for CHF exacerbation, started on IV Lasix then tapered down to her home dose of Torsemide. She had cellulitis at ICD site, BCx positive for Strep and staph s/p Cefepime, ceftriaxone, vancomycin. ID were onboard and recommended Linezolid for a total of 7 days (started on Nov 25, 2022). Following BCx were negative. Patient under went explantation of the ICD on 11/23/2022, surgical swab came back with positive prelim results of Enterococcus fecalis. YOKASTA was deferred because patient is at high risk of getting intubated during the procedure. Post explantation patient was having NSVTs and she was put on a Life vest.  During admission she had cold feet, LE arterial duplex showed severe PAD requiring outpatient follow up with vascular. LE venous duplex was negative.  Also she had a LUE swelling but US duplex was negative.

## 2022-11-29 NOTE — PROGRESS NOTE ADULT - ASSESSMENT
56 yo F PMHx COPD on 4 L home O2, CORNELIUS on CPAP, chronic HFrEF (19% on TTE 02/2022, life vest at home), pulmonary HTN, CAD s/p PCI to RCA, HTN, HLD, CVA with residual LLE weakness, DM II, active smoker on home O2, presented to the ED for SOB and LE and UE edema. Pt found to have CHF exacerbation as well as cellulitis.     A/P:   Acute on chronic HFrEF exacerbation:    SOB, leg edema. Pro-BNP 26K (baseline 3-4K).   Echo 3/2022: 30-35% EF, moderate MR and TR  s/p  Lasix IV 40mg BID. Switch to Torsemide 20mg PO daily.   Continue Entresto, spironolactone, and Metoprolol.   BiPAP prn.    Left sided AICD cellulitis pocket abscess:   Bacteremia: coag negative staph,   Left Upper Extremity cellulitis:   s/p AICD extraction 11/23/22. Wound care. Plan to place life vest again, patient refused today then again she states she wants it after we decided to discharge the patient (the company has concern about compliance because she didn't return her previous life vest).   s/p Rocephin and Vancomycin.  ID recommended Linezolid 600mg BID for 7 days.     Chronic hypoxemic respiratory failure 2/2 COPD on 4 L at baseline   CORNELIUS on CPAP   CPAP ordered based on prior setting, PEEP 8 and FiO2 40%  Continue Symbicort and albuterol prn, continue nasal canula.     CAD s/p PCI  stent to RCA last cath in hospital 6/21/2021  Continue Aspirin, Metoprolol and statin    Peripheral vascular disease:   Patient is c/o cold feet, feet are warm on exam with palpable pulses.   Arterial duplex showed left minimal flow within the superficial femoral artery and popliteal artery through the peroneal artery below the knee.  Vascular follow up outpatient.      History of CVA   Residual left side weakness  Continue ASA and Lipitor.      DM II  FS goal 110-180, A1C 5.6     Hypothyroidism: TSH 4.7, FT4 1.3 normal, Continue Levothyroxine.     CKD III: Stable     DVT prophylaxis  GI prophylaxis: on Protonix  Code status: Full Code    #Progress Note Handoff:  Pending (specify): life vest.   Family discussion:  Disposition: Home with home care in 24 hrs.

## 2022-11-29 NOTE — PROGRESS NOTE ADULT - ASSESSMENT
57 year old female with PMH of COPD on 4 L home O2, CORNELIUS on CPAP, HFrEF (19% on TTE 02/2022, s/p ICD, pulmonary HTN, CAD s/p PCI to RCA, HTN, HLD, CVA with residual LLE weakness, DM, active smoker on home O2, presented to the ED for SOB and LE and UE edema      # Cellulitis, infection of left sided AICD pocket site  # Bacteremia, blood culture growing Strep spp. and Coag negative Staph  - YOKASTA cancelled, patient is high risk for YOKASTA and might end up intubated  - Following Blood cultures so far negative  - S/p explantation on 11/23  - Surgical swab culture positive for Enterococcus Fecalis (11/23)  - s/p Cefepime ceftriaxone and vancomycin Currently on Linezolid for total of 7 days    # Acute HFrEF exacerbation   - previous TTE March 2022: 30-35% EF, moderate MR and TR  - s/p IV Lasix, currently on Toresmide 20mg QD  - on Entresto and spironolactone  - on Toprol 100 mg daily  - PT saw her -> recommended STR  - Life vest pending    #LUE swelling  - Duplex Arterial -> No high grade stenosis  - Duplex Venous -> No DVT    # Bilateral LE swelling  - likely 2/2 CHF  - Negative LE duplex  - Was complaining of cold feet, Duplex arterial showed severe PAD and vascular were consulted -> outpatient f/u with vascular    # Hypomagnesemia  - Replete as needed    # Chronic hypoxemic respiratory failure 2/2 COPD on 4 L at baseline   # CORNELIUS on CPAP   - CPAP ordered based on prior setting, PEEP 8 and FiO2 40%, but patient no compliant  - c/w Symbicort and Albuterol prn  - c/w oxygen supplementation    # CAD   - s/p PCI+stent to RCA last cath in hospital June 2021  - c/w aspirin and statin    # Elevated troponin  - likely from demand of CHF exacerbation     # Hx of CVA with residual LE weakness  - currently wheelchair bound   - c/w aspirin, statin     # DM II  - Low fasting sugar, Lantus held  - On sliding scale and standing pre-meals    # Hypothyroidism   - c/w levothyroxine    # CKD stage 2, stable   -  monitor Cr    # Suspected seizure disorder vs. mood disorder  - divalproex picked up on sure scripts   - c/w divalproex 250 mg BID    # DVT ppx: Lovenox resumed  # GI ppx: Pantoprazole  # Code status - Full Code  Handoff: Pending Life vest

## 2022-11-29 NOTE — DISCHARGE NOTE PROVIDER - NPI NUMBER (FOR SYSADMIN USE ONLY) :
[[id number] [[id number] [[id number]] [[id number]],[[id number]],[[id number]],[[id number]],[[id number]]

## 2022-11-29 NOTE — DISCHARGE NOTE PROVIDER - NSDCFUADDAPPT_GEN_ALL_CORE_FT
APPTS ARE READY TO BE MADE: YES    Best Family or Patient Contact (if needed): Patient [phone], Sister [phone]    Additional Information about above appointments (if needed):    1: Elenita Combs MD    Other comments or requests:    APPTS ARE READY TO BE MADE: [ ] YES    Best Family or Patient Contact (if needed):    Additional Information about above appointments (if needed):    1: Dr. Ge in 3 days  2:   3:     Other comments or requests:    APPTS ARE READY TO BE MADE: [ ] YES    Best Family or Patient Contact (if needed): [phone]    Additional Information about above appointments (if needed):    1: Dr. Ge in 3 days  2:   3:     Other comments or requests:

## 2022-11-29 NOTE — DISCHARGE NOTE PROVIDER - NSDCFUSCHEDAPPT_GEN_ALL_CORE_FT
Pardeep Soto  [facility]  CARDIOLOGY [address] [facility]  Scheduled Appointment: 12/01/2022

## 2022-11-29 NOTE — DISCHARGE NOTE PROVIDER - NSDCMRMEDTOKEN_GEN_ALL_CORE_FT
Albuterol (Eqv-ProAir HFA) 90 mcg/inh inhalation aerosol: 2 puff(s) inhaled every 6 hours, As Needed  aspirin 81 mg oral delayed release tablet: 1 tab(s) orally once a day   atorvastatin 80 mg oral tablet: 1 tab(s) orally once a day (at bedtime)  budesonide-formoterol 160 mcg-4.5 mcg/inh inhalation aerosol: 2 puff(s) inhaled 2 times a day   dapagliflozin 10 mg oral tablet: 1 tab(s) orally once a day   ergocalciferol 1.25 mg (50,000 intl units) oral capsule: 1 cap(s) orally every 7 days   fenofibrate 145 mg oral tablet: 1 tab(s) orally once a day  linezolid 600 mg oral tablet: 1 tab(s) orally 2 times a day   Lovaza 1000 mg oral capsule: 2 cap(s) orally 2 times a day  magnesium oxide 400 mg oral tablet: 1 tab(s) orally 3 times a day (with meals)  metFORMIN 1000 mg oral tablet: 1 tab(s) orally 2 times a day Do not take until 6/23  metoprolol succinate 100 mg oral tablet, extended release: 1 tab(s) orally once a day  Metoprolol Succinate ER 50 mg oral tablet, extended release: 1 tab(s) orally once a day   pantoprazole 40 mg oral delayed release tablet: 1 tab(s) orally once a day (before a meal)  Plavix 75 mg oral tablet: 1 tab(s) orally once a day  QUEtiapine 25 mg oral tablet: 1 tab(s) orally once a day (at bedtime)  sacubitril-valsartan 97 mg-103 mg oral tablet: 1 tab(s) orally 2 times a day  spironolactone 25 mg oral tablet: 2 tab(s) orally once a day  Synthroid 25 mcg (0.025 mg) oral tablet: 1 tab(s) orally once a day  torsemide 20 mg oral tablet: 1 tab(s) orally 2 times a day for 5 days  Then once daily    Albuterol (Eqv-ProAir HFA) 90 mcg/inh inhalation aerosol: 2 puff(s) inhaled every 6 hours, As Needed  aspirin 81 mg oral delayed release tablet: 1 tab(s) orally once a day   atorvastatin 80 mg oral tablet: 1 tab(s) orally once a day (at bedtime)  budesonide-formoterol 160 mcg-4.5 mcg/inh inhalation aerosol: 2 puff(s) inhaled 2 times a day   dapagliflozin 10 mg oral tablet: 1 tab(s) orally once a day   ergocalciferol 1.25 mg (50,000 intl units) oral capsule: 1 cap(s) orally every 7 days   fenofibrate 145 mg oral tablet: 1 tab(s) orally once a day  linezolid 600 mg oral tablet: 1 tab(s) orally 2 times a day   Lovaza 1000 mg oral capsule: 2 cap(s) orally 2 times a day  magnesium oxide 400 mg oral tablet: 1 tab(s) orally 3 times a day (with meals)  metFORMIN 1000 mg oral tablet: 1 tab(s) orally 2 times a day Do not take until 6/23  metoprolol succinate 100 mg oral tablet, extended release: 1 tab(s) orally once a day  pantoprazole 40 mg oral delayed release tablet: 1 tab(s) orally once a day (before a meal)  Plavix 75 mg oral tablet: 1 tab(s) orally once a day  QUEtiapine 25 mg oral tablet: 1 tab(s) orally once a day (at bedtime)  sacubitril-valsartan 97 mg-103 mg oral tablet: 1 tab(s) orally 2 times a day  spironolactone 25 mg oral tablet: 2 tab(s) orally once a day  Synthroid 25 mcg (0.025 mg) oral tablet: 1 tab(s) orally once a day  torsemide 20 mg oral tablet: 1 tab(s) orally 2 times a day for 5 days  Then once daily    Albuterol (Eqv-ProAir HFA) 90 mcg/inh inhalation aerosol: 2 puff(s) inhaled every 6 hours, As Needed  aspirin 81 mg oral delayed release tablet: 1 tab(s) orally once a day   atorvastatin 80 mg oral tablet: 1 tab(s) orally once a day (at bedtime)  budesonide-formoterol 160 mcg-4.5 mcg/inh inhalation aerosol: 2 puff(s) inhaled 2 times a day   dapagliflozin 10 mg oral tablet: 1 tab(s) orally once a day   divalproex sodium 250 mg oral delayed release tablet: 1 tab(s) orally 2 times a day  ergocalciferol 1.25 mg (50,000 intl units) oral capsule: 1 cap(s) orally every 7 days   fenofibrate 145 mg oral tablet: 1 tab(s) orally once a day  linezolid 600 mg oral tablet: 1 tab(s) orally 2 times a day   Lovaza 1000 mg oral capsule: 2 cap(s) orally 2 times a day  magnesium oxide 400 mg oral tablet: 1 tab(s) orally 3 times a day (with meals)  metoprolol succinate 100 mg oral tablet, extended release: 1 tab(s) orally once a day  pantoprazole 40 mg oral delayed release tablet: 1 tab(s) orally once a day (before a meal)  Plavix 75 mg oral tablet: 1 tab(s) orally once a day  QUEtiapine 25 mg oral tablet: 1 tab(s) orally once a day (at bedtime)  sacubitril-valsartan 97 mg-103 mg oral tablet: 1 tab(s) orally 2 times a day  spironolactone 25 mg oral tablet: 2 tab(s) orally once a day  Synthroid 25 mcg (0.025 mg) oral tablet: 1 tab(s) orally once a day   Albuterol (Eqv-ProAir HFA) 90 mcg/inh inhalation aerosol: 2 puff(s) inhaled every 6 hours, As Needed  aspirin 81 mg oral delayed release tablet: 1 tab(s) orally once a day   atorvastatin 80 mg oral tablet: 1 tab(s) orally once a day (at bedtime)  budesonide-formoterol 160 mcg-4.5 mcg/inh inhalation aerosol: 2 puff(s) inhaled 2 times a day   dapagliflozin 10 mg oral tablet: 1 tab(s) orally once a day   divalproex sodium 250 mg oral delayed release tablet: 1 tab(s) orally 2 times a day  ergocalciferol 1.25 mg (50,000 intl units) oral capsule: 1 cap(s) orally every 7 days   fenofibrate 145 mg oral tablet: 1 tab(s) orally once a day  Lovaza 1000 mg oral capsule: 2 cap(s) orally 2 times a day  magnesium oxide 400 mg oral tablet: 1 tab(s) orally 3 times a day (with meals)  metoprolol succinate 100 mg oral tablet, extended release: 1 tab(s) orally once a day  pantoprazole 40 mg oral delayed release tablet: 1 tab(s) orally once a day (before a meal)  Plavix 75 mg oral tablet: 1 tab(s) orally once a day  QUEtiapine 25 mg oral tablet: 1 tab(s) orally once a day (at bedtime)  sacubitril-valsartan 97 mg-103 mg oral tablet: 1 tab(s) orally 2 times a day  spironolactone 25 mg oral tablet: 2 tab(s) orally once a day  Synthroid 25 mcg (0.025 mg) oral tablet: 1 tab(s) orally once a day   Albuterol (Eqv-ProAir HFA) 90 mcg/inh inhalation aerosol: 2 puff(s) inhaled every 6 hours, As Needed  aspirin 81 mg oral delayed release tablet: 1 tab(s) orally once a day   atorvastatin 80 mg oral tablet: 1 tab(s) orally once a day (at bedtime)  Benadryl 25 mg oral capsule: 1 cap(s) orally every 8 hours, As Needed -for itching   budesonide-formoterol 160 mcg-4.5 mcg/inh inhalation aerosol: 2 puff(s) inhaled 2 times a day   dapagliflozin 10 mg oral tablet: 1 tab(s) orally once a day   divalproex sodium 250 mg oral delayed release tablet: 1 tab(s) orally 2 times a day  ergocalciferol 1.25 mg (50,000 intl units) oral capsule: 1 cap(s) orally every 7 days   fenofibrate 145 mg oral tablet: 1 tab(s) orally once a day  Lovaza 1000 mg oral capsule: 2 cap(s) orally 2 times a day  magnesium oxide 400 mg oral tablet: 1 tab(s) orally 3 times a day (with meals)  metoprolol succinate 100 mg oral tablet, extended release: 1 tab(s) orally once a day  oxyCODONE 5 mg oral tablet: 1 dose(s) orally every 6 hours, As Needed -for severe pain MDD:4   pantoprazole 40 mg oral delayed release tablet: 1 tab(s) orally once a day (before a meal)  Pharbetol 325 mg oral tablet: 2 tab(s) orally every 8 hours  Plavix 75 mg oral tablet: 1 tab(s) orally once a day  QUEtiapine 25 mg oral tablet: 1 tab(s) orally once a day (at bedtime)  sacubitril-valsartan 97 mg-103 mg oral tablet: 1 tab(s) orally 2 times a day  spironolactone 25 mg oral tablet: 2 tab(s) orally once a day  Synthroid 25 mcg (0.025 mg) oral tablet: 1 tab(s) orally once a day   Albuterol (Eqv-ProAir HFA) 90 mcg/inh inhalation aerosol: 2 puff(s) inhaled every 6 hours, As Needed  aspirin 81 mg oral delayed release tablet: 1 tab(s) orally once a day   atorvastatin 80 mg oral tablet: 1 tab(s) orally once a day (at bedtime)  Benadryl 25 mg oral capsule: 1 cap(s) orally every 8 hours, As Needed -for itching   budesonide-formoterol 160 mcg-4.5 mcg/inh inhalation aerosol: 2 puff(s) inhaled 2 times a day   dapagliflozin 10 mg oral tablet: 1 tab(s) orally once a day   divalproex sodium 250 mg oral delayed release tablet: 1 tab(s) orally 2 times a day  divalproex sodium 250 mg oral delayed release tablet: 1 tab(s) orally 2 times a day  ergocalciferol 1.25 mg (50,000 intl units) oral capsule: 1 cap(s) orally every 7 days   fenofibrate 145 mg oral tablet: 1 tab(s) orally once a day  Lovaza 1000 mg oral capsule: 2 cap(s) orally 2 times a day  magnesium oxide 400 mg oral tablet: 1 tab(s) orally 3 times a day (with meals)  metFORMIN 1000 mg oral tablet: 1 tab(s) orally 2 times a day  metoprolol succinate 100 mg oral tablet, extended release: 1 tab(s) orally once a day  oxyCODONE 5 mg oral tablet: 1 dose(s) orally every 6 hours, As Needed -for severe pain MDD:4   pantoprazole 40 mg oral delayed release tablet: 1 tab(s) orally once a day (before a meal)  Pharbetol 325 mg oral tablet: 2 tab(s) orally every 8 hours  Plavix 75 mg oral tablet: 1 tab(s) orally once a day  QUEtiapine 25 mg oral tablet: 1 tab(s) orally once a day (at bedtime)  sacubitril-valsartan 97 mg-103 mg oral tablet: 1 tab(s) orally 2 times a day  spironolactone 25 mg oral tablet: 2 tab(s) orally once a day  Synthroid 25 mcg (0.025 mg) oral tablet: 1 tab(s) orally once a day  torsemide 20 mg oral tablet: 1 tab(s) orally once a day  for 5 days  Then once daily

## 2022-11-29 NOTE — DISCHARGE NOTE PROVIDER - PROVIDER TOKENS
PROVIDER:[TOKEN:[[id number]],FOLLOWUP:[1 week],ESTABLISHEDPATIENT:[T]],PROVIDER:[TOKEN:[[id number]],FOLLOWUP:[2 weeks],ESTABLISHEDPATIENT:[T]] PROVIDER:[TOKEN:[[id number]],FOLLOWUP:[1 week],ESTABLISHEDPATIENT:[T]],PROVIDER:[TOKEN:[[id number]],FOLLOWUP:[2 weeks],ESTABLISHEDPATIENT:[T]],PROVIDER:[TOKEN:[[id number]],FOLLOWUP:[1 week],ESTABLISHEDPATIENT:[T]] PROVIDER:[TOKEN:[[id number]],FOLLOWUP:[1 week],ESTABLISHEDPATIENT:[T]],PROVIDER:[TOKEN:[[id number]],FOLLOWUP:[2 weeks],ESTABLISHEDPATIENT:[T]],PROVIDER:[TOKEN:[[id number]],FOLLOWUP:[1 week],ESTABLISHEDPATIENT:[T]],PROVIDER:[TOKEN:[[id number]],FOLLOWUP:[1 week]],PROVIDER:[TOKEN:[[id number]],FOLLOWUP:[2 weeks],ESTABLISHEDPATIENT:[T]] PROVIDER:[TOKEN:[[id number]],FOLLOWUP:[1 week],ESTABLISHEDPATIENT:[T]],PROVIDER:[TOKEN:[[id number]],FOLLOWUP:[2 weeks],ESTABLISHEDPATIENT:[T]],PROVIDER:[TOKEN:[[id number]],FOLLOWUP:[1 week],ESTABLISHEDPATIENT:[T]],PROVIDER:[TOKEN:[[id number]],FOLLOWUP:[1 week]],PROVIDER:[TOKEN:[[id number]],FOLLOWUP:[2 weeks],ESTABLISHEDPATIENT:[T]],FREE:[LAST:[[place]],PHONE:[phone]],FAX:[(   )    -],FOLLOWUP:[2 weeks]]

## 2022-11-29 NOTE — PROGRESS NOTE ADULT - SUBJECTIVE AND OBJECTIVE BOX
WILLIAN MARY  57y  Female      Patient is a 57y old  Female who presents with a chief complaint of SOB (29 Nov 2022 10:07)      INTERVAL HPI/OVERNIGHT EVENTS:  She feels ok  Vital Signs Last 24 Hrs  T(C): 36.4 (29 Nov 2022 13:52), Max: 36.9 (28 Nov 2022 19:32)  T(F): 97.6 (29 Nov 2022 13:52), Max: 98.5 (28 Nov 2022 19:32)  HR: 90 (29 Nov 2022 13:52) (62 - 90)  BP: 160/68 (29 Nov 2022 13:52) (108/59 - 160/68)  BP(mean): --  RR: 18 (29 Nov 2022 13:52) (18 - 18)  SpO2: 97% (29 Nov 2022 04:53) (97% - 97%)    Parameters below as of 29 Nov 2022 04:53  Patient On (Oxygen Delivery Method): nasal cannula  O2 Flow (L/min): 4        11-28-22 @ 07:01  -  11-29-22 @ 07:00  --------------------------------------------------------  IN: 1283 mL / OUT: 2500 mL / NET: -1217 mL    11-29-22 @ 07:01  -  11-29-22 @ 17:37  --------------------------------------------------------  IN: 586 mL / OUT: 400 mL / NET: 186 mL            Consultant(s) Notes Reviewed:  [x ] YES  [ ] NO          MEDICATIONS  (STANDING):  aspirin enteric coated 81 milliGRAM(s) Oral daily  atorvastatin 80 milliGRAM(s) Oral at bedtime  budesonide 160 MICROgram(s)/formoterol 4.5 MICROgram(s) Inhaler 2 Puff(s) Inhalation two times a day  divalproex  milliGRAM(s) Oral two times a day  enoxaparin Injectable 40 milliGRAM(s) SubCutaneous every 24 hours  influenza   Vaccine 0.5 milliLiter(s) IntraMuscular once  insulin lispro (ADMELOG) corrective regimen sliding scale   SubCutaneous three times a day before meals  insulin lispro Injectable (ADMELOG) 2 Unit(s) SubCutaneous three times a day before meals  linezolid  IVPB      linezolid  IVPB 600 milliGRAM(s) IV Intermittent every 12 hours  metoprolol succinate  milliGRAM(s) Oral daily  oxyCODONE    IR 5 milliGRAM(s) Oral once  pantoprazole    Tablet 40 milliGRAM(s) Oral before breakfast  polyethylene glycol 3350 17 Gram(s) Oral daily  sacubitril 49 mG/valsartan 51 mG 1 Tablet(s) Oral two times a day  spironolactone 50 milliGRAM(s) Oral daily  torsemide 20 milliGRAM(s) Oral daily  zolpidem 5 milliGRAM(s) Oral at bedtime    MEDICATIONS  (PRN):  acetaminophen     Tablet .. 650 milliGRAM(s) Oral every 6 hours PRN Temp greater or equal to 38C (100.4F), Mild Pain (1 - 3)  albuterol    90 MICROgram(s) HFA Inhaler 2 Puff(s) Inhalation every 6 hours PRN Bronchospasm  loratadine 10 milliGRAM(s) Oral daily PRN itchiness  oxyCODONE    IR 5 milliGRAM(s) Oral every 6 hours PRN Moderate Pain (4 - 6)      LABS                          10.8   5.05  )-----------( 186      ( 29 Nov 2022 04:54 )             34.0     11-29    135  |  88<L>  |  18  ----------------------------<  117<H>  4.3   |  35<H>  |  1.3    Ca    8.8      29 Nov 2022 04:54  Mg     1.9     11-29    TPro  6.1  /  Alb  3.2<L>  /  TBili  1.3<H>  /  DBili  x   /  AST  20  /  ALT  7   /  AlkPhos  124<H>  11-29          Lactate Trend        CAPILLARY BLOOD GLUCOSE      POCT Blood Glucose.: 113 mg/dL (29 Nov 2022 16:46)      Culture - Acid Fast - Other w/Smear (collected 11-23-22 @ 10:02)  Source: .Other None  Preliminary Report (11-26-22 @ 15:05):    Culture is being performed.    Culture - Surgical Swab (collected 11-23-22 @ 10:02)  Source: .Surgical Swab None  Final Report (11-29-22 @ 17:37):    Growth in fluid media only Enterococcus faecalis (vancomycin resistant)  Organism: Enterococcus faecalis (vancomycin resistant) (11-29-22 @ 17:37)  Organism: Enterococcus faecalis (vancomycin resistant) (11-29-22 @ 17:37)      -  Ampicillin: S <=2 Predicts results to ampicillin/sulbactam, amoxacillin-clavulanate and  piperacillin-tazobactam.      -  Daptomycin: S 1      -  Levofloxacin: R >4      -  Linezolid: S 1      -  Tetracycline: R >8      -  Vancomycin: R >16      Method Type: SAVANAH    Culture - Blood (collected 11-22-22 @ 21:51)  Source: .Blood Blood  Final Report (11-28-22 @ 03:01):    No Growth Final        RADIOLOGY & ADDITIONAL TESTS:    Imaging Personally Reviewed:  [ ] YES  [ ] NO    HEALTH ISSUES - PROBLEM Dx:          PHYSICAL EXAM:  GENERAL: NAD, well-developed.  HEAD:  Atraumatic, Normocephalic.  EYES: EOMI, PERRLA, conjunctiva and sclera clear.  NECK: Supple, No JVD.  CHEST/LUNG: bilateral ronchi  HEART: Regular rate and rhythm; S1 S2.   ABDOMEN: Soft, Nontender, Nondistended; Bowel sounds present.  EXTREMITIES:  2+ Peripheral Pulses, No clubbing, cyanosis, or edema.  PSYCH: AAOx3.  NEUROLOGY: chronic left side weakness.   SKIN: No rashes or lesions.

## 2022-11-29 NOTE — DISCHARGE NOTE PROVIDER - CARE PROVIDERS DIRECT ADDRESSES
,[email or web address] ,[email or web address] ,[email or web address] ,[email or web address],DirectAddress_Unknown,[email or web address],DirectAddress_Unknown

## 2022-11-30 LAB
ALBUMIN SERPL ELPH-MCNC: 3.4 G/DL — LOW (ref 3.5–5.2)
ALP SERPL-CCNC: 122 U/L — HIGH (ref 30–115)
ALT FLD-CCNC: 6 U/L — SIGNIFICANT CHANGE UP (ref 0–41)
ANION GAP SERPL CALC-SCNC: 9 MMOL/L — SIGNIFICANT CHANGE UP (ref 7–14)
AST SERPL-CCNC: 18 U/L — SIGNIFICANT CHANGE UP (ref 0–41)
BASOPHILS # BLD AUTO: 0.07 K/UL — SIGNIFICANT CHANGE UP (ref 0–0.2)
BASOPHILS NFR BLD AUTO: 1.5 % — HIGH (ref 0–1)
BILIRUB SERPL-MCNC: 1.2 MG/DL — SIGNIFICANT CHANGE UP (ref 0.2–1.2)
BUN SERPL-MCNC: 21 MG/DL — HIGH (ref 10–20)
CALCIUM SERPL-MCNC: 8.8 MG/DL — SIGNIFICANT CHANGE UP (ref 8.4–10.5)
CHLORIDE SERPL-SCNC: 91 MMOL/L — LOW (ref 98–110)
CO2 SERPL-SCNC: 36 MMOL/L — HIGH (ref 17–32)
CREAT SERPL-MCNC: 1.4 MG/DL — SIGNIFICANT CHANGE UP (ref 0.7–1.5)
EGFR: 44 ML/MIN/1.73M2 — LOW
EOSINOPHIL # BLD AUTO: 0.13 K/UL — SIGNIFICANT CHANGE UP (ref 0–0.7)
EOSINOPHIL NFR BLD AUTO: 2.9 % — SIGNIFICANT CHANGE UP (ref 0–8)
GLUCOSE BLDC GLUCOMTR-MCNC: 105 MG/DL — HIGH (ref 70–99)
GLUCOSE BLDC GLUCOMTR-MCNC: 114 MG/DL — HIGH (ref 70–99)
GLUCOSE BLDC GLUCOMTR-MCNC: 122 MG/DL — HIGH (ref 70–99)
GLUCOSE BLDC GLUCOMTR-MCNC: 179 MG/DL — HIGH (ref 70–99)
GLUCOSE SERPL-MCNC: 144 MG/DL — HIGH (ref 70–99)
HCT VFR BLD CALC: 32.8 % — LOW (ref 37–47)
HGB BLD-MCNC: 10.4 G/DL — LOW (ref 12–16)
IMM GRANULOCYTES NFR BLD AUTO: 0.4 % — HIGH (ref 0.1–0.3)
LYMPHOCYTES # BLD AUTO: 1.1 K/UL — LOW (ref 1.2–3.4)
LYMPHOCYTES # BLD AUTO: 24.3 % — SIGNIFICANT CHANGE UP (ref 20.5–51.1)
MAGNESIUM SERPL-MCNC: 2 MG/DL — SIGNIFICANT CHANGE UP (ref 1.8–2.4)
MCHC RBC-ENTMCNC: 28.3 PG — SIGNIFICANT CHANGE UP (ref 27–31)
MCHC RBC-ENTMCNC: 31.7 G/DL — LOW (ref 32–37)
MCV RBC AUTO: 89.1 FL — SIGNIFICANT CHANGE UP (ref 81–99)
MONOCYTES # BLD AUTO: 0.61 K/UL — HIGH (ref 0.1–0.6)
MONOCYTES NFR BLD AUTO: 13.5 % — HIGH (ref 1.7–9.3)
NEUTROPHILS # BLD AUTO: 2.59 K/UL — SIGNIFICANT CHANGE UP (ref 1.4–6.5)
NEUTROPHILS NFR BLD AUTO: 57.4 % — SIGNIFICANT CHANGE UP (ref 42.2–75.2)
NRBC # BLD: 0 /100 WBCS — SIGNIFICANT CHANGE UP (ref 0–0)
PLATELET # BLD AUTO: 180 K/UL — SIGNIFICANT CHANGE UP (ref 130–400)
POTASSIUM SERPL-MCNC: 4.3 MMOL/L — SIGNIFICANT CHANGE UP (ref 3.5–5)
POTASSIUM SERPL-SCNC: 4.3 MMOL/L — SIGNIFICANT CHANGE UP (ref 3.5–5)
PROT SERPL-MCNC: 6.2 G/DL — SIGNIFICANT CHANGE UP (ref 6–8)
RBC # BLD: 3.68 M/UL — LOW (ref 4.2–5.4)
RBC # FLD: 17.7 % — HIGH (ref 11.5–14.5)
SODIUM SERPL-SCNC: 136 MMOL/L — SIGNIFICANT CHANGE UP (ref 135–146)
WBC # BLD: 4.52 K/UL — LOW (ref 4.8–10.8)
WBC # FLD AUTO: 4.52 K/UL — LOW (ref 4.8–10.8)

## 2022-11-30 PROCEDURE — 99232 SBSQ HOSP IP/OBS MODERATE 35: CPT

## 2022-11-30 RX ADMIN — ZOLPIDEM TARTRATE 5 MILLIGRAM(S): 10 TABLET ORAL at 21:43

## 2022-11-30 RX ADMIN — Medication 1: at 08:18

## 2022-11-30 RX ADMIN — Medication 2 UNIT(S): at 08:17

## 2022-11-30 RX ADMIN — SPIRONOLACTONE 50 MILLIGRAM(S): 25 TABLET, FILM COATED ORAL at 05:25

## 2022-11-30 RX ADMIN — Medication 2 UNIT(S): at 12:00

## 2022-11-30 RX ADMIN — ATORVASTATIN CALCIUM 80 MILLIGRAM(S): 80 TABLET, FILM COATED ORAL at 21:43

## 2022-11-30 RX ADMIN — OXYCODONE HYDROCHLORIDE 5 MILLIGRAM(S): 5 TABLET ORAL at 13:26

## 2022-11-30 RX ADMIN — OXYCODONE HYDROCHLORIDE 5 MILLIGRAM(S): 5 TABLET ORAL at 21:12

## 2022-11-30 RX ADMIN — OXYCODONE HYDROCHLORIDE 5 MILLIGRAM(S): 5 TABLET ORAL at 12:04

## 2022-11-30 RX ADMIN — Medication 100 MILLIGRAM(S): at 05:27

## 2022-11-30 RX ADMIN — BUDESONIDE AND FORMOTEROL FUMARATE DIHYDRATE 2 PUFF(S): 160; 4.5 AEROSOL RESPIRATORY (INHALATION) at 12:01

## 2022-11-30 RX ADMIN — BUDESONIDE AND FORMOTEROL FUMARATE DIHYDRATE 2 PUFF(S): 160; 4.5 AEROSOL RESPIRATORY (INHALATION) at 20:30

## 2022-11-30 RX ADMIN — DIVALPROEX SODIUM 250 MILLIGRAM(S): 500 TABLET, DELAYED RELEASE ORAL at 05:25

## 2022-11-30 RX ADMIN — OXYCODONE HYDROCHLORIDE 5 MILLIGRAM(S): 5 TABLET ORAL at 20:12

## 2022-11-30 RX ADMIN — LINEZOLID 300 MILLIGRAM(S): 600 INJECTION, SOLUTION INTRAVENOUS at 22:07

## 2022-11-30 RX ADMIN — PANTOPRAZOLE SODIUM 40 MILLIGRAM(S): 20 TABLET, DELAYED RELEASE ORAL at 05:25

## 2022-11-30 RX ADMIN — Medication 2 UNIT(S): at 16:59

## 2022-11-30 RX ADMIN — SACUBITRIL AND VALSARTAN 1 TABLET(S): 24; 26 TABLET, FILM COATED ORAL at 19:35

## 2022-11-30 RX ADMIN — Medication 20 MILLIGRAM(S): at 05:25

## 2022-11-30 RX ADMIN — ENOXAPARIN SODIUM 40 MILLIGRAM(S): 100 INJECTION SUBCUTANEOUS at 17:03

## 2022-11-30 RX ADMIN — Medication 81 MILLIGRAM(S): at 12:02

## 2022-11-30 RX ADMIN — DIVALPROEX SODIUM 250 MILLIGRAM(S): 500 TABLET, DELAYED RELEASE ORAL at 17:03

## 2022-11-30 RX ADMIN — SACUBITRIL AND VALSARTAN 1 TABLET(S): 24; 26 TABLET, FILM COATED ORAL at 05:25

## 2022-11-30 NOTE — PROGRESS NOTE ADULT - ASSESSMENT
57 year old female with PMH of COPD on 4 L home O2, CORNELIUS on CPAP, HFrEF (19% on TTE 02/2022, s/p ICD, pulmonary HTN, CAD s/p PCI to RCA, HTN, HLD, CVA with residual LLE weakness, DM, active smoker on home O2, presented to the ED for SOB and LE and UE edema      # Cellulitis, infection of left sided AICD pocket site  # Bacteremia, blood culture growing Strep spp. and Coag negative Staph  - YOKASTA cancelled, patient is high risk for YOKASTA and might end up intubated  - Following Blood cultures so far negative  - S/p explantation on 11/23  - Surgical swab culture positive for Enterococcus Fecalis (11/23)  - s/p Cefepime ceftriaxone and vancomycin, Currently on Linezolid for total of 7 days    # Acute HFrEF exacerbation   - previous TTE March 2022: 30-35% EF, moderate MR and TR  - s/p IV Lasix, currently on Toresmide 20mg QD  - on Entresto and spironolactone  - on Toprol 100 mg daily  - PT saw her -> recommended STR  - Life vest pending, to be discharged afterwards    #LUE swelling  - Duplex Arterial -> No high grade stenosis  - Duplex Venous -> No DVT    # Bilateral LE swelling  - likely 2/2 CHF  - Negative LE duplex  - Was complaining of cold feet, Duplex arterial showed severe PAD and vascular were consulted -> outpatient f/u with vascular    # Hypomagnesemia  - Replete as needed    # Chronic hypoxemic respiratory failure 2/2 COPD on 4 L at baseline   # CORNELIUS on CPAP   - CPAP ordered based on prior setting, PEEP 8 and FiO2 40%, but patient no compliant  - c/w Symbicort and Albuterol prn  - c/w oxygen supplementation    # CAD   - s/p PCI+stent to RCA last cath in hospital June 2021  - c/w aspirin and statin    # Elevated troponin  - likely from demand of CHF exacerbation     # Hx of CVA with residual LE weakness  - currently wheelchair bound   - c/w aspirin, statin     # DM II  - Low fasting sugar, Lantus held  - On sliding scale and standing pre-meals    # Hypothyroidism   - c/w levothyroxine    # CKD stage 2, stable   -  monitor Cr    # Suspected seizure disorder vs. mood disorder  - divalproex picked up on sure scripts   - c/w divalproex 250 mg BID    # DVT ppx: Lovenox resumed  # GI ppx: Pantoprazole  # Code status - Full Code  Handoff: Pending Life vest

## 2022-11-30 NOTE — PROGRESS NOTE ADULT - SUBJECTIVE AND OBJECTIVE BOX
24H events:    Patient is a 57y old Female who presents with a chief complaint of SOB (29 Nov 2022 10:07)    Primary diagnosis of CHF exacerbation       Today is hospital day 11d. This morning patient was seen and examined at bedside, resting comfortably in bed.      PAST MEDICAL & SURGICAL HISTORY  Hypertension    Hyperlipidemia    Anxiety and depression    COPD, severe    CHF (congestive heart failure)    Cerebrovascular accident (CVA)  Multiple    Type 2 diabetes mellitus    CKD (chronic kidney disease), stage II    No significant past surgical history      SOCIAL HISTORY:  Social History:      ALLERGIES:  No Known Allergies    MEDICATIONS:  STANDING MEDICATIONS  aspirin enteric coated 81 milliGRAM(s) Oral daily  atorvastatin 80 milliGRAM(s) Oral at bedtime  budesonide 160 MICROgram(s)/formoterol 4.5 MICROgram(s) Inhaler 2 Puff(s) Inhalation two times a day  divalproex  milliGRAM(s) Oral two times a day  enoxaparin Injectable 40 milliGRAM(s) SubCutaneous every 24 hours  influenza   Vaccine 0.5 milliLiter(s) IntraMuscular once  insulin lispro (ADMELOG) corrective regimen sliding scale   SubCutaneous three times a day before meals  insulin lispro Injectable (ADMELOG) 2 Unit(s) SubCutaneous three times a day before meals  linezolid  IVPB      linezolid  IVPB 600 milliGRAM(s) IV Intermittent every 12 hours  metoprolol succinate  milliGRAM(s) Oral daily  oxyCODONE    IR 5 milliGRAM(s) Oral once  pantoprazole    Tablet 40 milliGRAM(s) Oral before breakfast  polyethylene glycol 3350 17 Gram(s) Oral daily  sacubitril 49 mG/valsartan 51 mG 1 Tablet(s) Oral two times a day  spironolactone 50 milliGRAM(s) Oral daily  torsemide 20 milliGRAM(s) Oral daily  zolpidem 5 milliGRAM(s) Oral at bedtime    PRN MEDICATIONS  acetaminophen     Tablet .. 650 milliGRAM(s) Oral every 6 hours PRN  albuterol    90 MICROgram(s) HFA Inhaler 2 Puff(s) Inhalation every 6 hours PRN  loratadine 10 milliGRAM(s) Oral daily PRN  oxyCODONE    IR 5 milliGRAM(s) Oral every 6 hours PRN        LABS:                        10.4   4.52  )-----------( 180      ( 30 Nov 2022 05:15 )             32.8     11-30    136  |  91<L>  |  21<H>  ----------------------------<  144<H>  4.3   |  36<H>  |  1.4    Ca    8.8      30 Nov 2022 05:15  Mg     2.0     11-30    TPro  6.2  /  Alb  3.4<L>  /  TBili  1.2  /  DBili  x   /  AST  18  /  ALT  6   /  AlkPhos  122<H>  11-30      RADIOLOGY:    VITALS:   T(F): 97.7  HR: 60  BP: 123/65  RR: 18  SpO2: --    PHYSICAL EXAM:    GENERAL: NAD, well-groomed, well-developed  HEAD:  Atraumatic, Normocephalic  EYES: EOMI  NECK: Supple  NERVOUS SYSTEM:  Alert & Oriented X3, motor 5/5 b/l upper and lower ext, sensation intact  CHEST/LUNG: Poor inspiratory effort  HEART: Regular rate and rhythm; No murmurs, rubs, or gallops  ABDOMEN: Soft, Nontender, Nondistended; Bowel sounds present  EXTREMITIES:  Warm, no pulse on palpation, No pitting edema  LYMPH: No lymphadenopathy noted  SKIN: No rashes or lesions

## 2022-11-30 NOTE — PROGRESS NOTE ADULT - ASSESSMENT
58 yo F PMHx COPD on 4 L home O2, CORNELIUS on CPAP, chronic HFrEF (19% on TTE 02/2022, life vest at home), pulmonary HTN, CAD s/p PCI to RCA, HTN, HLD, CVA with residual LLE weakness, DM II, active smoker on home O2, presented to the ED for SOB and LE and UE edema. Pt found to have CHF exacerbation as well as cellulitis.     Acute on chronic HFrEF exacerbation:    -SOB, leg edema. Pro-BNP 26K (baseline 3-4K).   -Echo 3/2022: 30-35% EF, moderate MR and TR  -s/p  Lasix IV 40mg BID. c/w Torsemide 20mg PO daily.   -Continue Entresto, spironolactone, and Metoprolol.   -BiPAP prn.    Left sided AICD cellulitis pocket abscess:   Bacteremia: coag negative staph,   -Left Upper Extremity cellulitis:   -s/p AICD extraction 11/23/22. Wound care. Plan to place life vest again, patient refused today then again she states she wants it after we decided to discharge the patient (the company has concern about compliance because she didn't return her previous life vest).   -s/p Rocephin and Vancomycin.  I-D recommended Linezolid 600mg BID for 7 days.     Chronic hypoxemic respiratory failure 2/2 COPD on 4 L at baseline   -CORNELIUS on CPAP   -CPAP ordered based on prior setting, PEEP 8 and FiO2 40%  -Continue Symbicort and albuterol prn, continue nasal canula.     CAD s/p PCI  -stent to RCA last cath in hospital 6/21/2021  -Continue Aspirin, Metoprolol and statin    Peripheral vascular disease:   -Patient is c/o cold feet, feet are warm on exam with palpable pulses.   -Arterial duplex showed left minimal flow within the superficial femoral artery and popliteal artery through the peroneal artery below the knee.  -Vascular follow up outpatient.      History of CVA   -Residual left side weakness  -Continue ASA and Lipitor.      DM II  FS goal 110-180, A1C 5.6     Hypothyroidism: TSH 4.7, FT4 1.3 normal, Continue Levothyroxine.     CKD III: Stable     DVT prophylaxis  GI prophylaxis: on Protonix  Code status: Full Code    #Progress Note Handoff:  Pending (specify): HHA arrangement  Family discussion: discussed razia pt states her aid is in hospital, SW confirmed HHA to come to bedside tomorrow to assist patient home.   Disposition: Home with home care in 24 hrs.

## 2022-11-30 NOTE — PROGRESS NOTE ADULT - SUBJECTIVE AND OBJECTIVE BOX
CHIEF COMPLAINT:    Patient is a 57y old  Female who presents with a chief complaint of SOB     INTERVAL HPI/OVERNIGHT EVENTS:    Patient seen and examined at bedside. No acute overnight events occurred.    ROS: Denies chest pain. All other systems are negative.    Medications:  Standing  aspirin enteric coated 81 milliGRAM(s) Oral daily  atorvastatin 80 milliGRAM(s) Oral at bedtime  budesonide 160 MICROgram(s)/formoterol 4.5 MICROgram(s) Inhaler 2 Puff(s) Inhalation two times a day  divalproex  milliGRAM(s) Oral two times a day  enoxaparin Injectable 40 milliGRAM(s) SubCutaneous every 24 hours  influenza   Vaccine 0.5 milliLiter(s) IntraMuscular once  insulin lispro (ADMELOG) corrective regimen sliding scale   SubCutaneous three times a day before meals  insulin lispro Injectable (ADMELOG) 2 Unit(s) SubCutaneous three times a day before meals  linezolid  IVPB      linezolid  IVPB 600 milliGRAM(s) IV Intermittent every 12 hours  metoprolol succinate  milliGRAM(s) Oral daily  oxyCODONE    IR 5 milliGRAM(s) Oral once  pantoprazole    Tablet 40 milliGRAM(s) Oral before breakfast  polyethylene glycol 3350 17 Gram(s) Oral daily  sacubitril 49 mG/valsartan 51 mG 1 Tablet(s) Oral two times a day  spironolactone 50 milliGRAM(s) Oral daily  torsemide 20 milliGRAM(s) Oral daily  zolpidem 5 milliGRAM(s) Oral at bedtime    PRN Meds  acetaminophen     Tablet .. 650 milliGRAM(s) Oral every 6 hours PRN  albuterol    90 MICROgram(s) HFA Inhaler 2 Puff(s) Inhalation every 6 hours PRN  loratadine 10 milliGRAM(s) Oral daily PRN  oxyCODONE    IR 5 milliGRAM(s) Oral every 6 hours PRN        Vital Signs:    T(F): 97.6 (22 @ 15:51), Max: 97.7 (22 @ 04:40)  HR: 90 (22 @ 15:51) (60 - 90)  BP: 108/55 (22 @ 15:51) (98/59 - 128/70)  RR: 18 (22 @ 15:51) (18 - )  SpO2: --  I&O's Summary    2022 07:01  -  2022 07:00  --------------------------------------------------------  IN: 936 mL / OUT: 1300 mL / NET: -364 mL    2022 07:01  -  2022 17:10  --------------------------------------------------------  IN: 200 mL / OUT: 400 mL / NET: -200 mL      Daily Height in cm: 162.56 (2022 15:51)    Daily Weight in k (2022 04:40)  CAPILLARY BLOOD GLUCOSE      POCT Blood Glucose.: 122 mg/dL (2022 16:27)  POCT Blood Glucose.: 114 mg/dL (2022 11:31)  POCT Blood Glucose.: 179 mg/dL (2022 07:40)  POCT Blood Glucose.: 145 mg/dL (2022 21:40)      PHYSICAL EXAM:  GENERAL:  NAD  SKIN: chest wall scar, dressing over left chest wall  HEENT: Atraumatic. Normocephalic. Anicteric  NECK:  No JVD.   PULMONARY: Clear to ausculation bilaterally. No wheezing. No rales  CVS: Normal S1, S2. Regular rate and rhythm. No murmurs.  ABDOMEN/GI: Soft, Nontender, Nondistended; Bowel sounds are present  EXTREMITIES:  trace edema LUE  NEUROLOGIC:  No motor deficit.  PSYCH: Alert & oriented x 3, normal affect      LABS:                        10.4   4.52  )-----------( 180      ( 2022 05:15 )             32.8         136  |  91<L>  |  21<H>  ----------------------------<  144<H>  4.3   |  36<H>  |  1.4    Ca    8.8      2022 05:15  Mg     2.0         TPro  6.2  /  Alb  3.4<L>  /  TBili  1.2  /  DBili  x   /  AST  18  /  ALT  6   /  AlkPhos  122<H>          RADIOLOGY & ADDITIONAL TESTS:  Imaging or report Personally Reviewed:  [ ] YES  [ ] NO -->no new images    Telemetry reviewed independently - NSR, no acute events  EKG reviewed independently -->no new EKGs    Consultant(s) Notes Reviewed:  [ ] YES  [ ] NO  Care Discussed with Consultants/Other Providers [ ] YES  [ ] NO    Case discussed with resident  Care discussed with pt

## 2022-12-01 ENCOUNTER — APPOINTMENT (OUTPATIENT)
Dept: CARDIOLOGY | Facility: CLINIC | Age: 57
End: 2022-12-01

## 2022-12-01 LAB
ALBUMIN SERPL ELPH-MCNC: 3.7 G/DL — SIGNIFICANT CHANGE UP (ref 3.5–5.2)
ALP SERPL-CCNC: 127 U/L — HIGH (ref 30–115)
ALT FLD-CCNC: <5 U/L — SIGNIFICANT CHANGE UP (ref 0–41)
ANION GAP SERPL CALC-SCNC: 9 MMOL/L — SIGNIFICANT CHANGE UP (ref 7–14)
AST SERPL-CCNC: 18 U/L — SIGNIFICANT CHANGE UP (ref 0–41)
BASOPHILS # BLD AUTO: 0.08 K/UL — SIGNIFICANT CHANGE UP (ref 0–0.2)
BASOPHILS NFR BLD AUTO: 1.8 % — HIGH (ref 0–1)
BILIRUB SERPL-MCNC: 1.3 MG/DL — HIGH (ref 0.2–1.2)
BUN SERPL-MCNC: 22 MG/DL — HIGH (ref 10–20)
CALCIUM SERPL-MCNC: 9 MG/DL — SIGNIFICANT CHANGE UP (ref 8.4–10.5)
CHLORIDE SERPL-SCNC: 90 MMOL/L — LOW (ref 98–110)
CO2 SERPL-SCNC: 34 MMOL/L — HIGH (ref 17–32)
CREAT SERPL-MCNC: 1.4 MG/DL — SIGNIFICANT CHANGE UP (ref 0.7–1.5)
EGFR: 44 ML/MIN/1.73M2 — LOW
EOSINOPHIL # BLD AUTO: 0.13 K/UL — SIGNIFICANT CHANGE UP (ref 0–0.7)
EOSINOPHIL NFR BLD AUTO: 2.9 % — SIGNIFICANT CHANGE UP (ref 0–8)
GLUCOSE BLDC GLUCOMTR-MCNC: 102 MG/DL — HIGH (ref 70–99)
GLUCOSE BLDC GLUCOMTR-MCNC: 103 MG/DL — HIGH (ref 70–99)
GLUCOSE BLDC GLUCOMTR-MCNC: 107 MG/DL — HIGH (ref 70–99)
GLUCOSE BLDC GLUCOMTR-MCNC: 120 MG/DL — HIGH (ref 70–99)
GLUCOSE SERPL-MCNC: 114 MG/DL — HIGH (ref 70–99)
HCT VFR BLD CALC: 33.9 % — LOW (ref 37–47)
HGB BLD-MCNC: 10.8 G/DL — LOW (ref 12–16)
IMM GRANULOCYTES NFR BLD AUTO: 0.2 % — SIGNIFICANT CHANGE UP (ref 0.1–0.3)
LYMPHOCYTES # BLD AUTO: 1.03 K/UL — LOW (ref 1.2–3.4)
LYMPHOCYTES # BLD AUTO: 22.6 % — SIGNIFICANT CHANGE UP (ref 20.5–51.1)
MAGNESIUM SERPL-MCNC: 1.6 MG/DL — LOW (ref 1.8–2.4)
MCHC RBC-ENTMCNC: 28.6 PG — SIGNIFICANT CHANGE UP (ref 27–31)
MCHC RBC-ENTMCNC: 31.9 G/DL — LOW (ref 32–37)
MCV RBC AUTO: 89.7 FL — SIGNIFICANT CHANGE UP (ref 81–99)
MONOCYTES # BLD AUTO: 0.51 K/UL — SIGNIFICANT CHANGE UP (ref 0.1–0.6)
MONOCYTES NFR BLD AUTO: 11.2 % — HIGH (ref 1.7–9.3)
NEUTROPHILS # BLD AUTO: 2.79 K/UL — SIGNIFICANT CHANGE UP (ref 1.4–6.5)
NEUTROPHILS NFR BLD AUTO: 61.3 % — SIGNIFICANT CHANGE UP (ref 42.2–75.2)
NRBC # BLD: 0 /100 WBCS — SIGNIFICANT CHANGE UP (ref 0–0)
PLATELET # BLD AUTO: 187 K/UL — SIGNIFICANT CHANGE UP (ref 130–400)
POTASSIUM SERPL-MCNC: 4.3 MMOL/L — SIGNIFICANT CHANGE UP (ref 3.5–5)
POTASSIUM SERPL-SCNC: 4.3 MMOL/L — SIGNIFICANT CHANGE UP (ref 3.5–5)
PROT SERPL-MCNC: 6.5 G/DL — SIGNIFICANT CHANGE UP (ref 6–8)
RBC # BLD: 3.78 M/UL — LOW (ref 4.2–5.4)
RBC # FLD: 17.6 % — HIGH (ref 11.5–14.5)
SODIUM SERPL-SCNC: 133 MMOL/L — LOW (ref 135–146)
WBC # BLD: 4.55 K/UL — LOW (ref 4.8–10.8)
WBC # FLD AUTO: 4.55 K/UL — LOW (ref 4.8–10.8)

## 2022-12-01 PROCEDURE — 99238 HOSP IP/OBS DSCHRG MGMT 30/<: CPT

## 2022-12-01 RX ORDER — LINEZOLID 600 MG/300ML
600 INJECTION, SOLUTION INTRAVENOUS ONCE
Refills: 0 | Status: COMPLETED | OUTPATIENT
Start: 2022-12-01 | End: 2022-12-01

## 2022-12-01 RX ORDER — MAGNESIUM SULFATE 500 MG/ML
2 VIAL (ML) INJECTION
Refills: 0 | Status: COMPLETED | OUTPATIENT
Start: 2022-12-01 | End: 2022-12-01

## 2022-12-01 RX ORDER — LINEZOLID 600 MG/300ML
600 INJECTION, SOLUTION INTRAVENOUS EVERY 12 HOURS
Refills: 0 | Status: COMPLETED | OUTPATIENT
Start: 2022-12-02 | End: 2022-12-03

## 2022-12-01 RX ORDER — ZOLPIDEM TARTRATE 10 MG/1
5 TABLET ORAL ONCE
Refills: 0 | Status: DISCONTINUED | OUTPATIENT
Start: 2022-12-01 | End: 2022-12-01

## 2022-12-01 RX ORDER — DIVALPROEX SODIUM 500 MG/1
1 TABLET, DELAYED RELEASE ORAL
Qty: 60 | Refills: 0
Start: 2022-12-01 | End: 2022-12-30

## 2022-12-01 RX ORDER — METFORMIN HYDROCHLORIDE 850 MG/1
1 TABLET ORAL
Qty: 60 | Refills: 0
Start: 2022-12-01

## 2022-12-01 RX ADMIN — LINEZOLID 600 MILLIGRAM(S): 600 INJECTION, SOLUTION INTRAVENOUS at 18:56

## 2022-12-01 RX ADMIN — OXYCODONE HYDROCHLORIDE 5 MILLIGRAM(S): 5 TABLET ORAL at 18:07

## 2022-12-01 RX ADMIN — PANTOPRAZOLE SODIUM 40 MILLIGRAM(S): 20 TABLET, DELAYED RELEASE ORAL at 05:35

## 2022-12-01 RX ADMIN — Medication 2 UNIT(S): at 11:57

## 2022-12-01 RX ADMIN — DIVALPROEX SODIUM 250 MILLIGRAM(S): 500 TABLET, DELAYED RELEASE ORAL at 17:56

## 2022-12-01 RX ADMIN — Medication 100 MILLIGRAM(S): at 05:34

## 2022-12-01 RX ADMIN — DIVALPROEX SODIUM 250 MILLIGRAM(S): 500 TABLET, DELAYED RELEASE ORAL at 05:35

## 2022-12-01 RX ADMIN — ENOXAPARIN SODIUM 40 MILLIGRAM(S): 100 INJECTION SUBCUTANEOUS at 17:56

## 2022-12-01 RX ADMIN — ATORVASTATIN CALCIUM 80 MILLIGRAM(S): 80 TABLET, FILM COATED ORAL at 21:26

## 2022-12-01 RX ADMIN — Medication 2 UNIT(S): at 08:41

## 2022-12-01 RX ADMIN — SPIRONOLACTONE 50 MILLIGRAM(S): 25 TABLET, FILM COATED ORAL at 05:34

## 2022-12-01 RX ADMIN — SACUBITRIL AND VALSARTAN 1 TABLET(S): 24; 26 TABLET, FILM COATED ORAL at 17:56

## 2022-12-01 RX ADMIN — Medication 25 GRAM(S): at 15:13

## 2022-12-01 RX ADMIN — Medication 2 UNIT(S): at 17:57

## 2022-12-01 RX ADMIN — Medication 81 MILLIGRAM(S): at 11:58

## 2022-12-01 RX ADMIN — BUDESONIDE AND FORMOTEROL FUMARATE DIHYDRATE 2 PUFF(S): 160; 4.5 AEROSOL RESPIRATORY (INHALATION) at 08:40

## 2022-12-01 RX ADMIN — Medication 20 MILLIGRAM(S): at 05:35

## 2022-12-01 RX ADMIN — OXYCODONE HYDROCHLORIDE 5 MILLIGRAM(S): 5 TABLET ORAL at 19:30

## 2022-12-01 RX ADMIN — SACUBITRIL AND VALSARTAN 1 TABLET(S): 24; 26 TABLET, FILM COATED ORAL at 05:34

## 2022-12-01 RX ADMIN — BUDESONIDE AND FORMOTEROL FUMARATE DIHYDRATE 2 PUFF(S): 160; 4.5 AEROSOL RESPIRATORY (INHALATION) at 19:49

## 2022-12-01 RX ADMIN — POLYETHYLENE GLYCOL 3350 17 GRAM(S): 17 POWDER, FOR SOLUTION ORAL at 11:59

## 2022-12-01 RX ADMIN — Medication 25 GRAM(S): at 11:58

## 2022-12-01 NOTE — CHART NOTE - NSCHARTNOTEFT_GEN_A_CORE
Pt seen and examined at bedside. Surgical site with bleeding. CTSx evaluate wound, satisfied with healing. Pt witness picking at wound, pt instructed not to. Pt stable for dc home    PHYSICAL EXAM:  GENERAL: NAD, speaks in full sentences, no signs of respiratory distress  HEAD:  Atraumatic, Normocephalic  EYES: EOMI, PERRLA, conjunctiva and sclera clear  NECK: Supple, No JVD  CHEST/LUNG: Clear to auscultation bilaterally; No wheeze; No crackles; No accessory muscles used  HEART: Regular rate and rhythm; No murmurs;   ABDOMEN: Soft, Nontender, Nondistended; Bowel sounds present; No guarding  EXTREMITIES:  2+ Peripheral Pulses, No cyanosis or edema  PSYCH: AAOx3  NEUROLOGY: non-focal  SKIN: No rashes or lesions

## 2022-12-01 NOTE — CHART NOTE - NSCHARTNOTEFT_GEN_A_CORE
Limited:      Medical:  56 yo F PMHx COPD on 4 L home O2, CORNELIUS on CPAP, chronic HFrEF (19% on TTE 02/2022, life vest at home), pulmonary HTN, CAD s/p PCI to RCA, HTN, HLD, CVA with residual LLE weakness, DM II, active smoker on home O2, presented to the ED for SOB and LE and UE edema. Pt found to have CHF exacerbation as well as cellulitis.     Patient with adequate appetite/PO during admission, consuming % of meal trays  plan for d/c today with HHA    162.6cm  weight: noted as 40.8g though actually 78kg 11/28  bmi adjusted for true weight:  29.5kg/m2   weight from 2021 was 81.6kg  pt weight relatively similar    patient at no nutrition risk  follow up prn or if consulted  Amdanuta Ching [id number] or via teams

## 2022-12-01 NOTE — CHART NOTE - NSCHARTNOTEFT_GEN_A_CORE
Call MD was notified by RN that patient is refusing discharge. Patient seen at bedside. Confirmed that she was scheduled to be discharged today, but cannot leave because "there is no one to pick [her] up." She then stated "I'm leaving tomorrow morning." The medical admitting resident (MAR) was notified.

## 2022-12-02 LAB
GLUCOSE BLDC GLUCOMTR-MCNC: 108 MG/DL — HIGH (ref 70–99)
GLUCOSE BLDC GLUCOMTR-MCNC: 116 MG/DL — HIGH (ref 70–99)
GLUCOSE BLDC GLUCOMTR-MCNC: 120 MG/DL — HIGH (ref 70–99)
GLUCOSE BLDC GLUCOMTR-MCNC: 122 MG/DL — HIGH (ref 70–99)

## 2022-12-02 PROCEDURE — 99233 SBSQ HOSP IP/OBS HIGH 50: CPT

## 2022-12-02 PROCEDURE — 93306 TTE W/DOPPLER COMPLETE: CPT | Mod: 26

## 2022-12-02 PROCEDURE — 99222 1ST HOSP IP/OBS MODERATE 55: CPT

## 2022-12-02 RX ADMIN — ZOLPIDEM TARTRATE 5 MILLIGRAM(S): 10 TABLET ORAL at 00:20

## 2022-12-02 RX ADMIN — SACUBITRIL AND VALSARTAN 1 TABLET(S): 24; 26 TABLET, FILM COATED ORAL at 17:12

## 2022-12-02 RX ADMIN — Medication 100 MILLIGRAM(S): at 05:23

## 2022-12-02 RX ADMIN — DIVALPROEX SODIUM 250 MILLIGRAM(S): 500 TABLET, DELAYED RELEASE ORAL at 17:12

## 2022-12-02 RX ADMIN — ATORVASTATIN CALCIUM 80 MILLIGRAM(S): 80 TABLET, FILM COATED ORAL at 21:01

## 2022-12-02 RX ADMIN — Medication 650 MILLIGRAM(S): at 20:26

## 2022-12-02 RX ADMIN — DIVALPROEX SODIUM 250 MILLIGRAM(S): 500 TABLET, DELAYED RELEASE ORAL at 05:22

## 2022-12-02 RX ADMIN — Medication 650 MILLIGRAM(S): at 20:59

## 2022-12-02 RX ADMIN — Medication 20 MILLIGRAM(S): at 05:23

## 2022-12-02 RX ADMIN — Medication 2 UNIT(S): at 07:52

## 2022-12-02 RX ADMIN — Medication 2 UNIT(S): at 11:42

## 2022-12-02 RX ADMIN — BUDESONIDE AND FORMOTEROL FUMARATE DIHYDRATE 2 PUFF(S): 160; 4.5 AEROSOL RESPIRATORY (INHALATION) at 07:53

## 2022-12-02 RX ADMIN — PANTOPRAZOLE SODIUM 40 MILLIGRAM(S): 20 TABLET, DELAYED RELEASE ORAL at 05:22

## 2022-12-02 RX ADMIN — SPIRONOLACTONE 50 MILLIGRAM(S): 25 TABLET, FILM COATED ORAL at 05:24

## 2022-12-02 RX ADMIN — Medication 81 MILLIGRAM(S): at 11:42

## 2022-12-02 RX ADMIN — LINEZOLID 600 MILLIGRAM(S): 600 INJECTION, SOLUTION INTRAVENOUS at 17:12

## 2022-12-02 RX ADMIN — Medication 2 UNIT(S): at 17:13

## 2022-12-02 RX ADMIN — SACUBITRIL AND VALSARTAN 1 TABLET(S): 24; 26 TABLET, FILM COATED ORAL at 05:23

## 2022-12-02 RX ADMIN — ENOXAPARIN SODIUM 40 MILLIGRAM(S): 100 INJECTION SUBCUTANEOUS at 17:12

## 2022-12-02 RX ADMIN — LINEZOLID 600 MILLIGRAM(S): 600 INJECTION, SOLUTION INTRAVENOUS at 05:23

## 2022-12-02 NOTE — PROGRESS NOTE ADULT - ASSESSMENT
56 yo F PMHx COPD on 4 L home O2, CORNELIUS on CPAP, chronic HFrEF (19% on TTE 02/2022, life vest at home), pulmonary HTN, CAD s/p PCI to RCA, HTN, HLD, CVA with residual LLE weakness, DM II, active smoker on home O2, presented to the ED for SOB and LE and UE edema. Pt found to have CHF exacerbation as well as cellulitis.     Patient was discharged yesterday but she refused to go home so [facility] cancelled dc without notifying me. This morning echo lab took patient for echo without an order. They called resident who ordered 2d echo without confirming it with me. This error was fortuitous however since it showed a stable pericardial effusion.    Moderate pericardial effusion  - found to echo AFTER explantation of AICD. Case d/w CT surgery, doubt this is related to procedure but recommended cardio eval  - case d/w cardio fellow, will evaluate patient  - no evidence of tamponade, hemodynamically stable  - has been tolerating toresmide so will continue for now    Acute on chronic HFrEF exacerbation:    -SOB, leg edema. Pro-BNP 26K (baseline 3-4K).   -Echo 3/2022: 30-35% EF, moderate MR and TR  -s/p  Lasix IV 40mg BID. c/w Torsemide 20mg PO daily.   -Continue Entresto, spironolactone, and Metoprolol.   -BiPAP prn.    Left sided AICD cellulitis pocket abscess:   Bacteremia: coag negative staph,   -Left Upper Extremity cellulitis:   -s/p AICD extraction 11/23/22. Wound care. Plan to place life vest again, patient refused today then again she states she wants it after we decided to discharge the patient (the company has concern about compliance because she didn't return her previous life vest).   -s/p Rocephin and Vancomycin.  I-D recommended Linezolid 600mg BID for 7 days.     Chronic hypoxemic respiratory failure 2/2 COPD on 4 L at baseline   -CORNELIUS on CPAP   -CPAP ordered based on prior setting, PEEP 8 and FiO2 40%  -Continue Symbicort and albuterol prn, continue nasal canula.     CAD s/p PCI  -stent to RCA last cath in hospital 6/21/2021  -Continue Aspirin, Metoprolol and statin    Peripheral vascular disease:   -Patient is c/o cold feet, feet are warm on exam with palpable pulses.   -Arterial duplex showed left minimal flow within the superficial femoral artery and popliteal artery through the peroneal artery below the knee.  -Vascular follow up outpatient.      History of CVA   -Residual left side weakness  -Continue ASA and Lipitor.      DM II  FS goal 110-180, A1C 5.6     Hypothyroidism: TSH 4.7, FT4 1.3 normal, Continue Levothyroxine.     CKD III: Stable     DVT prophylaxis  GI prophylaxis: on Protonix  Code status: Full Code    #Progress Note Handoff:  Pending (specify): cardio eval  Family discussion: discussed effusion  Disposition: Home with home care in 24 hrs.

## 2022-12-02 NOTE — CONSULT NOTE ADULT - ASSESSMENT
#AHRF 2/2 CHF decompensation   #Moderate pericardial effusion likely transudative 2/2 R-sided decompensation   #HFrEF 2/2 ICM    #Severe PHTN   #AICD infection s/p explantation  Euvolemic on exam   AICD site clean - explanted   - c/w torsemide 20 oral OD  - c/w GDMT: metoprolol 100 succ - entresto 49/51 - spironolactone 50  - resume dapaglifozin on discharge   - stable pericardial effusion   - f/u OP with Dr. Ge    #AHRF 2/2 CHF decompensation   #Moderate pericardial effusion - Loculated to R atria   #HFrEF 2/2 ICM    #Severe PHTN   #AICD infection s/p explantation   Euvolemic on exam   AICD site clean - explanted   - c/w torsemide 20 oral OD  - c/w GDMT: metoprolol 100 succ - entresto 49/51 - spironolactone 50  - resume dapaglifozin on discharge   - f/u with CTS team   - f/u OP with Dr. Ge   - no further cardiological work-up

## 2022-12-02 NOTE — PROGRESS NOTE ADULT - ASSESSMENT
57 year old female with PMH of COPD on 4 L home O2, CORNELIUS on CPAP, HFrEF (19% on TTE 02/2022, s/p ICD, pulmonary HTN, CAD s/p PCI to RCA, HTN, HLD, CVA with residual LLE weakness, DM, active smoker on home O2, presented to the ED for SOB and LE and UE edema      # Cellulitis, infection of left sided AICD pocket site  # Bacteremia, blood culture growing Strep spp. and Coag negative Staph  - YOKASTA cancelled, patient is high risk for YOKASTA and might end up intubated  - Following Blood cultures so far negative  - S/p explantation on 11/23  - Surgical swab culture positive for Enterococcus Fecalis (11/23)  - s/p Cefepime ceftriaxone and vancomycin, Currently on Linezolid for total of 7 days    # Acute HFrEF exacerbation   - previous TTE March 2022: 30-35% EF, moderate MR and TR  - s/p IV Lasix, currently on Toresmide 20mg QD  - on Entresto and spironolactone  - on Toprol 100 mg daily  - PT saw her -> recommended STR  - Currently on Life Vest  - Echo cardio 12/2 showed pericardial effusion, might be secondary to explantation of AICD    #LUE swelling  - Duplex Arterial -> No high grade stenosis  - Duplex Venous -> No DVT    # Bilateral LE swelling  - likely 2/2 CHF  - Negative LE duplex  - Was complaining of cold feet, Duplex arterial showed severe PAD and vascular were consulted -> outpatient f/u with vascular    # Hypomagnesemia  - Replete as needed    # Chronic hypoxemic respiratory failure 2/2 COPD on 4 L at baseline   # CORNELIUS on CPAP   - CPAP ordered based on prior setting, PEEP 8 and FiO2 40%, but patient no compliant  - c/w Symbicort and Albuterol prn  - c/w oxygen supplementation (On 3 liters NC)    # CAD   - s/p PCI+stent to RCA last cath in hospital June 2021  - c/w aspirin and statin    # Elevated troponin  - likely from demand of CHF exacerbation     # Hx of CVA with residual LE weakness  - currently wheelchair bound   - c/w aspirin, statin     # DM II  - Low fasting sugar, Lantus held  - On sliding scale and standing pre-meals    # Hypothyroidism   - c/w levothyroxine    # CKD stage 2, stable   -  monitor Cr    # Suspected seizure disorder vs. mood disorder  - divalproex picked up on [facility]   - c/w divalproex 250 mg BID    # DVT ppx: Lovenox resumed  # GI ppx: Pantoprazole  # Code status - Full Code  Handoff: Cardio evaluation for the pericardial effusion

## 2022-12-02 NOTE — CONSULT NOTE ADULT - ATTENDING COMMENTS
Seen 12/2/22 evening with fellow.    CAD s/p PCI  Cardiomyopathy (severe LV dysfunction) s/p AICD  Chronic Systolic CHF  Pulmonary HTN    Multiple comorbidities as above.    Infected AICD pocket s/p explant / lead extraction 2-weeks ago.    Consulted for pericardial effusion.    No chest pain / dyspnea.  Hemodynamics stable.  No acute CHF decompensation.    ECHO's reviewed: Chronic small to moderate localized pericardial effusion (RA) dating back to 3/2022.  Perhaps larger on 11/27/22 ECHO.  Stable on 12/2/22 ECHO.    No clinical or ECHO tamponade.  Urgent drainage not required.    Recommend CT surgery evaluation given recent RA lead explant by their service, although unlikely related to procedure.  Cont ASA at present.  Cont Toprol, Entresto, Spironolactone, Torsemide.

## 2022-12-02 NOTE — CONSULT NOTE ADULT - SUBJECTIVE AND OBJECTIVE BOX
Outpt cardiologist:    HPI:  57 year old female with PMH of COPD on 4 L home O2, CORNELIUS on CPAP, HFrEF (19% on TTE 02/2022, life vest at home), pulmonary HTN, CAD s/p PCI to RCA, HTN, HLD, CVA with residual LLE weakness, DM, active smoker on home O2 4L, presented to the ED for SOB and LE and UE edema. Notably, patient was here in 4/2022, but requested to be discharged prior to getting an AICD placed. in the interim, she had the AICD placed at [facility]. Patient began to experience SOB, and swelling in her arms and legs. SOB has worsened over the last 3 days. Pt states that she stopped her diuretic recently. Additionally, she noticed pain and "leaking" from the AICD site since it was placed 2 months ago associated with left UE swelling. Noted pt was also prescribed fluconazole and rifampin for 30 days supply from Oct 19, 2022 from [employer]. She denies fevers, chills, chest pain, or palpitations. Pt states that she was in SNF for 4 months and was discharged home with HAA 3 weeks ago. Pt is currently dependent on motorized wheelchair and only able to stand on short period using walker.    ED Course:   /98, HR 97.6, 96% on 4L, HR 98  CXR on my read shows bilateral congestion   trop 0.03, BNP 26,000    Patient admitted for CHrEF Exacerbation, and  suspected Cellulitis from AICD site.     Med Rec is not completed; [facility], and reports she does not know any of the meds she takes (even aspirin or water pill), she takes them from an unmarked blister pack; Pharmacy med rec may also be inaccurate, her meds were adjusted recently at a nursing home, per patient. Current meds added from [facility] scripts and prior admissions          (19 Nov 2022 10:42)      ---  cardio fellow additional notes:    Patient was seen and examined at bedside. She feels fine and otherwise has no complaints.  Cardiology is consulted for evaluation of moderate pericardial effusion seen on Echo. Patient has extensive cardiac history for HFrEF (19% on TTE 02/2022, life vest at home), severe pulmonary HTN, CAD s/p PCI to RCA, HTN, HLD, CVA with residual LLE weakness, DM, active smoker on home O2 4L, presented to the ED for SOB and LE and UE edema and found out to have HFrEF decompensation.   She was treated in the hospital with Lasix IV       PAST MEDICAL & SURGICAL HISTORY  Hypertension    Hyperlipidemia    Anxiety and depression    COPD, severe    CHF (congestive heart failure)    Cerebrovascular accident (CVA)  Multiple    Type 2 diabetes mellitus    CKD (chronic kidney disease), stage II    No significant past surgical history        FAMILY HISTORY:  FAMILY HISTORY:  FH: diabetes mellitus (Father, Mother)        SOCIAL HISTORY:  Social History:      ALLERGIES:  No Known Allergies      MEDICATIONS:  aspirin enteric coated 81 milliGRAM(s) Oral daily  atorvastatin 80 milliGRAM(s) Oral at bedtime  budesonide 160 MICROgram(s)/formoterol 4.5 MICROgram(s) Inhaler 2 Puff(s) Inhalation two times a day  divalproex  milliGRAM(s) Oral two times a day  enoxaparin Injectable 40 milliGRAM(s) SubCutaneous every 24 hours  influenza   Vaccine 0.5 milliLiter(s) IntraMuscular once  insulin lispro (ADMELOG) corrective regimen sliding scale   SubCutaneous three times a day before meals  insulin lispro Injectable (ADMELOG) 2 Unit(s) SubCutaneous three times a day before meals  linezolid    Tablet 600 milliGRAM(s) Oral every 12 hours  metoprolol succinate  milliGRAM(s) Oral daily  pantoprazole    Tablet 40 milliGRAM(s) Oral before breakfast  polyethylene glycol 3350 17 Gram(s) Oral daily  sacubitril 49 mG/valsartan 51 mG 1 Tablet(s) Oral two times a day  spironolactone 50 milliGRAM(s) Oral daily  torsemide 20 milliGRAM(s) Oral daily    PRN:  acetaminophen     Tablet .. 650 milliGRAM(s) Oral every 6 hours PRN  albuterol    90 MICROgram(s) HFA Inhaler 2 Puff(s) Inhalation every 6 hours PRN  loratadine 10 milliGRAM(s) Oral daily PRN      HOME MEDICATIONS:  Home Medications:  atorvastatin 80 mg oral tablet: 1 tab(s) orally once a day (at bedtime) (16 Feb 2022 13:50)  fenofibrate 145 mg oral tablet: 1 tab(s) orally once a day (16 Feb 2022 13:50)  magnesium oxide 400 mg oral tablet: 1 tab(s) orally 3 times a day (with meals) (14 Jan 2022 12:37)  metoprolol succinate 100 mg oral tablet, extended release: 1 tab(s) orally once a day (19 Nov 2022 11:09)  pantoprazole 40 mg oral delayed release tablet: 1 tab(s) orally once a day (before a meal) (16 Nov 2021 12:10)  Synthroid 25 mcg (0.025 mg) oral tablet: 1 tab(s) orally once a day (19 Nov 2022 11:09)      VITALS:   T(F): 97 (12-02 @ 14:09), Max: 98.1 (11-29 @ 13:49)  HR: 81 (12-02 @ 14:09) (60 - 92)  BP: 150/72 (12-02 @ 14:09) (21/64 - 160/68)  BP(mean): --  RR: 18 (12-02 @ 14:09) (18 - 18)  SpO2: 98% (12-02 @ 05:50) (97% - 98%)    I&O's Summary    01 Dec 2022 07:01  -  02 Dec 2022 07:00  --------------------------------------------------------  IN: 1842 mL / OUT: 2000 mL / NET: -158 mL    02 Dec 2022 07:01  -  02 Dec 2022 16:28  --------------------------------------------------------  IN: 0 mL / OUT: 1 mL / NET: -1 mL        REVIEW OF SYSTEMS:  CONSTITUTIONAL: No weakness, fevers or chills  HEENT: No visual changes, neck/ear pain  RESPIRATORY: No cough, sob  CARDIOVASCULAR: See HPI  GASTROINTESTINAL: No abdominal pain. No nausea, vomiting, diarrhea   GENITOURINARY: No dysuria, frequency or hematuria  NEUROLOGICAL: No new focal deficits  SKIN: No new rashes    PHYSICAL EXAM:  General: Not in distress.  Non-toxic appearing.   HEENT: EOMI  Cardio: regular, S1, S2, no murmur  Pulm: B/L BS.  Decreased breath sounds   Abdomen: Soft, non-tender, non-distended. Normoactive bowel sounds  Extremities: No edema b/l le  Neuro: A&O x3. No focal deficits    LABS:                        10.8   4.55  )-----------( 187      ( 01 Dec 2022 04:48 )             33.9     12-01    133<L>  |  90<L>  |  22<H>  ----------------------------<  114<H>  4.3   |  34<H>  |  1.4    Ca    9.0      01 Dec 2022 04:48  Mg     1.6     12-01    TPro  6.5  /  Alb  3.7  /  TBili  1.3<H>  /  DBili  x   /  AST  18  /  ALT  <5  /  AlkPhos  127<H>  12-01              Troponin trend:        COVID-19 PCR: NotDetec (22 Nov 2022 15:30)  COVID-19 PCR: NotDetec (18 Nov 2022 23:46)      RADIOLOGY:  -CXR:  -TTE:  -CCTA:  -STRESS TEST:  -CATHETERIZATION:  -OTHER:  ECG:      TELEMETRY EVENTS:   Outpt cardiologist:    HPI:  57 year old female with PMH of COPD on 4 L home O2, CORNELIUS on CPAP, HFrEF (19% on TTE 02/2022, life vest at home), pulmonary HTN, CAD s/p PCI to RCA, HTN, HLD, CVA with residual LLE weakness, DM, active smoker on home O2 4L, presented to the ED for SOB and LE and UE edema. Notably, patient was here in 4/2022, but requested to be discharged prior to getting an AICD placed. in the interim, she had the AICD placed at [facility]. Patient began to experience SOB, and swelling in her arms and legs. SOB has worsened over the last 3 days. Pt states that she stopped her diuretic recently. Additionally, she noticed pain and "leaking" from the AICD site since it was placed 2 months ago associated with left UE swelling. Noted pt was also prescribed fluconazole and rifampin for 30 days supply from Oct 19, 2022 from [employer]. She denies fevers, chills, chest pain, or palpitations. Pt states that she was in SNF for 4 months and was discharged home with HAA 3 weeks ago. Pt is currently dependent on motorized wheelchair and only able to stand on short period using walker.    ED Course:   /98, HR 97.6, 96% on 4L, HR 98  CXR on my read shows bilateral congestion   trop 0.03, BNP 26,000    Patient admitted for CHrEF Exacerbation, and  suspected Cellulitis from AICD site.     Med Rec is not completed; [facility], and reports she does not know any of the meds she takes (even aspirin or water pill), she takes them from an unmarked blister pack; Pharmacy med rec may also be inaccurate, her meds were adjusted recently at a nursing home, per patient. Current meds added from [facility] scripts and prior admissions          (19 Nov 2022 10:42)      ---  cardio fellow additional notes:    Patient was seen and examined at bedside. She feels fine and otherwise has no complaints.  Cardiology is consulted for evaluation of moderate pericardial effusion seen on Echo. Patient has extensive cardiac history for HFrEF (19% on TTE 02/2022, life vest at home), severe pulmonary HTN, CAD s/p PCI to RCA, HTN, HLD, CVA with residual LLE weakness, DM, active smoker on home O2 4L, presented to the ED for SOB and LE and UE edema and found out to have   -HFrEF decompensation. She was treated in the hospital with Lasix IV and is nearing euvolemia currently with resuming of torsemide 10  -AICD pocket infection s/p explantation     Patient stated that she was not compliant with medications  She is an active smoker    CXR reviewed showed cardiomegaly   1st TTE done during this admission showed moderate pericardial effusion without signs of tamponade   2nd TTE done showed stable moderate pericardial effusion without signs of tamponade       PAST MEDICAL & SURGICAL HISTORY  Hypertension    Hyperlipidemia    Anxiety and depression    COPD, severe    CHF (congestive heart failure)    Cerebrovascular accident (CVA)  Multiple    Type 2 diabetes mellitus    CKD (chronic kidney disease), stage II    No significant past surgical history        FAMILY HISTORY:  FAMILY HISTORY:  FH: diabetes mellitus (Father, Mother)        SOCIAL HISTORY:  Social History:      ALLERGIES:  No Known Allergies      MEDICATIONS:  aspirin enteric coated 81 milliGRAM(s) Oral daily  atorvastatin 80 milliGRAM(s) Oral at bedtime  budesonide 160 MICROgram(s)/formoterol 4.5 MICROgram(s) Inhaler 2 Puff(s) Inhalation two times a day  divalproex  milliGRAM(s) Oral two times a day  enoxaparin Injectable 40 milliGRAM(s) SubCutaneous every 24 hours  influenza   Vaccine 0.5 milliLiter(s) IntraMuscular once  insulin lispro (ADMELOG) corrective regimen sliding scale   SubCutaneous three times a day before meals  insulin lispro Injectable (ADMELOG) 2 Unit(s) SubCutaneous three times a day before meals  linezolid    Tablet 600 milliGRAM(s) Oral every 12 hours  metoprolol succinate  milliGRAM(s) Oral daily  pantoprazole    Tablet 40 milliGRAM(s) Oral before breakfast  polyethylene glycol 3350 17 Gram(s) Oral daily  sacubitril 49 mG/valsartan 51 mG 1 Tablet(s) Oral two times a day  spironolactone 50 milliGRAM(s) Oral daily  torsemide 20 milliGRAM(s) Oral daily    PRN:  acetaminophen     Tablet .. 650 milliGRAM(s) Oral every 6 hours PRN  albuterol    90 MICROgram(s) HFA Inhaler 2 Puff(s) Inhalation every 6 hours PRN  loratadine 10 milliGRAM(s) Oral daily PRN      HOME MEDICATIONS:  Home Medications:  atorvastatin 80 mg oral tablet: 1 tab(s) orally once a day (at bedtime) (16 Feb 2022 13:50)  fenofibrate 145 mg oral tablet: 1 tab(s) orally once a day (16 Feb 2022 13:50)  magnesium oxide 400 mg oral tablet: 1 tab(s) orally 3 times a day (with meals) (14 Jan 2022 12:37)  metoprolol succinate 100 mg oral tablet, extended release: 1 tab(s) orally once a day (19 Nov 2022 11:09)  pantoprazole 40 mg oral delayed release tablet: 1 tab(s) orally once a day (before a meal) (16 Nov 2021 12:10)  Synthroid 25 mcg (0.025 mg) oral tablet: 1 tab(s) orally once a day (19 Nov 2022 11:09)      VITALS:   T(F): 97 (12-02 @ 14:09), Max: 98.1 (11-29 @ 13:49)  HR: 81 (12-02 @ 14:09) (60 - 92)  BP: 150/72 (12-02 @ 14:09) (21/64 - 160/68)  BP(mean): --  RR: 18 (12-02 @ 14:09) (18 - 18)  SpO2: 98% (12-02 @ 05:50) (97% - 98%)    I&O's Summary    01 Dec 2022 07:01  -  02 Dec 2022 07:00  --------------------------------------------------------  IN: 1842 mL / OUT: 2000 mL / NET: -158 mL    02 Dec 2022 07:01  -  02 Dec 2022 16:28  --------------------------------------------------------  IN: 0 mL / OUT: 1 mL / NET: -1 mL        REVIEW OF SYSTEMS:  CONSTITUTIONAL: No weakness, fevers or chills  HEENT: No visual changes, neck/ear pain  RESPIRATORY: No cough, sob  CARDIOVASCULAR: See HPI  GASTROINTESTINAL: No abdominal pain. No nausea, vomiting, diarrhea   GENITOURINARY: No dysuria, frequency or hematuria  NEUROLOGICAL: No new focal deficits  SKIN: No new rashes    PHYSICAL EXAM:  General: Not in distress.  Non-toxic appearing.   HEENT: EOMI  Cardio: regular, S1, S2, no murmur  Pulm: B/L BS.  Decreased breath sounds   Abdomen: Soft, non-tender, non-distended. Normoactive bowel sounds  Extremities: No edema b/l le  Neuro: A&O x3. No focal deficits    LABS:                        10.8   4.55  )-----------( 187      ( 01 Dec 2022 04:48 )             33.9     12-01    133<L>  |  90<L>  |  22<H>  ----------------------------<  114<H>  4.3   |  34<H>  |  1.4    Ca    9.0      01 Dec 2022 04:48  Mg     1.6     12-01    TPro  6.5  /  Alb  3.7  /  TBili  1.3<H>  /  DBili  x   /  AST  18  /  ALT  <5  /  AlkPhos  127<H>  12-01              Troponin trend:        COVID-19 PCR: NotDetec (22 Nov 2022 15:30)  COVID-19 PCR: NotDetec (18 Nov 2022 23:46)      RADIOLOGY:  -CXR:  -TTE:  -CCTA:  -STRESS TEST:  -CATHETERIZATION:  -OTHER:  ECG:      TELEMETRY EVENTS:   Outpt cardiologist:    HPI:  57 year old female with PMH of COPD on 4 L home O2, CORNELIUS on CPAP, HFrEF (19% on TTE 02/2022, life vest at home), pulmonary HTN, CAD s/p PCI to RCA, HTN, HLD, CVA with residual LLE weakness, DM, active smoker on home O2 4L, presented to the ED for SOB and LE and UE edema. Notably, patient was here in 4/2022, but requested to be discharged prior to getting an AICD placed. in the interim, she had the AICD placed at [facility]. Patient began to experience SOB, and swelling in her arms and legs. SOB has worsened over the last 3 days. Pt states that she stopped her diuretic recently. Additionally, she noticed pain and "leaking" from the AICD site since it was placed 2 months ago associated with left UE swelling. Noted pt was also prescribed fluconazole and rifampin for 30 days supply from Oct 19, 2022 from [employer]. She denies fevers, chills, chest pain, or palpitations. Pt states that she was in SNF for 4 months and was discharged home with HAA 3 weeks ago. Pt is currently dependent on motorized wheelchair and only able to stand on short period using walker.    ED Course:   /98, HR 97.6, 96% on 4L, HR 98  CXR on my read shows bilateral congestion   trop 0.03, BNP 26,000    Patient admitted for CHrEF Exacerbation, and  suspected Cellulitis from AICD site.     Med Rec is not completed; [email or web address], and reports she does not know any of the meds she takes (even aspirin or water pill), she takes them from an unmarked blister pack; Pharmacy med rec may also be inaccurate, her meds were adjusted recently at a nursing home, per patient. Current meds added from [facility] scripts and prior admissions          (19 Nov 2022 10:42)      ---  cardio fellow additional notes:    Patient was seen and examined at bedside. She feels fine and otherwise has no complaints.  Cardiology is consulted for evaluation of moderate pericardial effusion seen on Echo. Patient has extensive cardiac history for HFrEF (19% on TTE 02/2022, life vest at home), severe pulmonary HTN, CAD s/p PCI to RCA, HTN, HLD, CVA with residual LLE weakness, DM, active smoker on home O2 4L, presented to the ED for SOB and LE and UE edema and found out to have:  -HFrEF decompensation. She was treated in the hospital with Lasix IV and is nearing euvolemia currently with resuming of torsemide 10  -AICD pocket infection s/p explantation - she has a lifevest in the interrim period     Patient stated that she was not compliant with medications  She is an active smoker    CXR reviewed showed cardiomegaly   1st TTE done during this admission showed moderate pericardial effusion without signs of tamponade   2nd TTE done showed stable moderate pericardial effusion without signs of tamponade   Both TTE showed localization of moderate pericardial effusion to RA       PAST MEDICAL & SURGICAL HISTORY  Hypertension    Hyperlipidemia    Anxiety and depression    COPD, severe    CHF (congestive heart failure)    Cerebrovascular accident (CVA)  Multiple    Type 2 diabetes mellitus    CKD (chronic kidney disease), stage II    No significant past surgical history        FAMILY HISTORY:  FAMILY HISTORY:  FH: diabetes mellitus (Father, Mother)        SOCIAL HISTORY:  Social History:      ALLERGIES:  No Known Allergies      MEDICATIONS:  aspirin enteric coated 81 milliGRAM(s) Oral daily  atorvastatin 80 milliGRAM(s) Oral at bedtime  budesonide 160 MICROgram(s)/formoterol 4.5 MICROgram(s) Inhaler 2 Puff(s) Inhalation two times a day  divalproex  milliGRAM(s) Oral two times a day  enoxaparin Injectable 40 milliGRAM(s) SubCutaneous every 24 hours  influenza   Vaccine 0.5 milliLiter(s) IntraMuscular once  insulin lispro (ADMELOG) corrective regimen sliding scale   SubCutaneous three times a day before meals  insulin lispro Injectable (ADMELOG) 2 Unit(s) SubCutaneous three times a day before meals  linezolid    Tablet 600 milliGRAM(s) Oral every 12 hours  metoprolol succinate  milliGRAM(s) Oral daily  pantoprazole    Tablet 40 milliGRAM(s) Oral before breakfast  polyethylene glycol 3350 17 Gram(s) Oral daily  sacubitril 49 mG/valsartan 51 mG 1 Tablet(s) Oral two times a day  spironolactone 50 milliGRAM(s) Oral daily  torsemide 20 milliGRAM(s) Oral daily    PRN:  acetaminophen     Tablet .. 650 milliGRAM(s) Oral every 6 hours PRN  albuterol    90 MICROgram(s) HFA Inhaler 2 Puff(s) Inhalation every 6 hours PRN  loratadine 10 milliGRAM(s) Oral daily PRN      HOME MEDICATIONS:  Home Medications:  atorvastatin 80 mg oral tablet: 1 tab(s) orally once a day (at bedtime) (16 Feb 2022 13:50)  fenofibrate 145 mg oral tablet: 1 tab(s) orally once a day (16 Feb 2022 13:50)  magnesium oxide 400 mg oral tablet: 1 tab(s) orally 3 times a day (with meals) (14 Jan 2022 12:37)  metoprolol succinate 100 mg oral tablet, extended release: 1 tab(s) orally once a day (19 Nov 2022 11:09)  pantoprazole 40 mg oral delayed release tablet: 1 tab(s) orally once a day (before a meal) (16 Nov 2021 12:10)  Synthroid 25 mcg (0.025 mg) oral tablet: 1 tab(s) orally once a day (19 Nov 2022 11:09)      VITALS:   T(F): 97 (12-02 @ 14:09), Max: 98.1 (11-29 @ 13:49)  HR: 81 (12-02 @ 14:09) (60 - 92)  BP: 150/72 (12-02 @ 14:09) (21/64 - 160/68)  BP(mean): --  RR: 18 (12-02 @ 14:09) (18 - 18)  SpO2: 98% (12-02 @ 05:50) (97% - 98%)    I&O's Summary    01 Dec 2022 07:01  -  02 Dec 2022 07:00  --------------------------------------------------------  IN: 1842 mL / OUT: 2000 mL / NET: -158 mL    02 Dec 2022 07:01  -  02 Dec 2022 16:28  --------------------------------------------------------  IN: 0 mL / OUT: 1 mL / NET: -1 mL        REVIEW OF SYSTEMS:  CONSTITUTIONAL: No weakness, fevers or chills  HEENT: No visual changes, neck/ear pain  RESPIRATORY: No cough, sob  CARDIOVASCULAR: See HPI  GASTROINTESTINAL: No abdominal pain. No nausea, vomiting, diarrhea   GENITOURINARY: No dysuria, frequency or hematuria  NEUROLOGICAL: No new focal deficits  SKIN: No new rashes    PHYSICAL EXAM:  General: Not in distress.  Non-toxic appearing.   HEENT: EOMI  Cardio: regular, S1, S2, no murmur  Pulm: B/L BS.  Decreased breath sounds   Abdomen: Soft, non-tender, non-distended. Normoactive bowel sounds  Extremities: No edema b/l le  Neuro: A&O x3. No focal deficits    LABS:                        10.8   4.55  )-----------( 187      ( 01 Dec 2022 04:48 )             33.9     12-01    133<L>  |  90<L>  |  22<H>  ----------------------------<  114<H>  4.3   |  34<H>  |  1.4    Ca    9.0      01 Dec 2022 04:48  Mg     1.6     12-01    TPro  6.5  /  Alb  3.7  /  TBili  1.3<H>  /  DBili  x   /  AST  18  /  ALT  <5  /  AlkPhos  127<H>  12-01              Troponin trend:        COVID-19 PCR: NotDetec (22 Nov 2022 15:30)  COVID-19 PCR: NotDetec (18 Nov 2022 23:46)      RADIOLOGY:  -CXR:  -TTE:  -CCTA:  -STRESS TEST:  -CATHETERIZATION:  -OTHER:  ECG:      TELEMETRY EVENTS:

## 2022-12-02 NOTE — PROGRESS NOTE ADULT - SUBJECTIVE AND OBJECTIVE BOX
24H events:    Patient is a 57y old Female who presents with a chief complaint of SOB (29 Nov 2022 10:07)    Primary diagnosis of CHF exacerbation       Today is hospital day 13d. This morning patient was seen and examined at bedside, resting comfortably in bed.      PAST MEDICAL & SURGICAL HISTORY  Hypertension    Hyperlipidemia    Anxiety and depression    COPD, severe    CHF (congestive heart failure)    Cerebrovascular accident (CVA)  Multiple    Type 2 diabetes mellitus    CKD (chronic kidney disease), stage II    No significant past surgical history      SOCIAL HISTORY:  Social History:      ALLERGIES:  No Known Allergies    MEDICATIONS:  STANDING MEDICATIONS  aspirin enteric coated 81 milliGRAM(s) Oral daily  atorvastatin 80 milliGRAM(s) Oral at bedtime  budesonide 160 MICROgram(s)/formoterol 4.5 MICROgram(s) Inhaler 2 Puff(s) Inhalation two times a day  divalproex  milliGRAM(s) Oral two times a day  enoxaparin Injectable 40 milliGRAM(s) SubCutaneous every 24 hours  influenza   Vaccine 0.5 milliLiter(s) IntraMuscular once  insulin lispro (ADMELOG) corrective regimen sliding scale   SubCutaneous three times a day before meals  insulin lispro Injectable (ADMELOG) 2 Unit(s) SubCutaneous three times a day before meals  linezolid    Tablet 600 milliGRAM(s) Oral every 12 hours  metoprolol succinate  milliGRAM(s) Oral daily  pantoprazole    Tablet 40 milliGRAM(s) Oral before breakfast  polyethylene glycol 3350 17 Gram(s) Oral daily  sacubitril 49 mG/valsartan 51 mG 1 Tablet(s) Oral two times a day  spironolactone 50 milliGRAM(s) Oral daily  torsemide 20 milliGRAM(s) Oral daily    PRN MEDICATIONS  acetaminophen     Tablet .. 650 milliGRAM(s) Oral every 6 hours PRN  albuterol    90 MICROgram(s) HFA Inhaler 2 Puff(s) Inhalation every 6 hours PRN  loratadine 10 milliGRAM(s) Oral daily PRN        LABS:                        10.8   4.55  )-----------( 187      ( 01 Dec 2022 04:48 )             33.9     12-01    133<L>  |  90<L>  |  22<H>  ----------------------------<  114<H>  4.3   |  34<H>  |  1.4    Ca    9.0      01 Dec 2022 04:48  Mg     1.6     12-01    TPro  6.5  /  Alb  3.7  /  TBili  1.3<H>  /  DBili  x   /  AST  18  /  ALT  <5  /  AlkPhos  127<H>  12-01    RADIOLOGY:    VITALS:   T(F): 97  HR: 81  BP: 150/72  RR: 18  SpO2: 98%    PHYSICAL EXAM:    GENERAL: NAD, well-groomed, well-developed  HEAD:  Atraumatic, Normocephalic  EYES: EOMI  NECK: Supple  NERVOUS SYSTEM:  Alert & Oriented X3, motor 5/5 b/l upper and lower ext, sensation intact  CHEST/LUNG: Poor inspiratory effort  HEART: Regular rate and rhythm; No murmurs, rubs, or gallops  ABDOMEN: Soft, Nontender, Nondistended; Bowel sounds present  EXTREMITIES:  Warm, no pulse on palpation, No pitting edema  LYMPH: No lymphadenopathy noted  SKIN: No rashes or lesions

## 2022-12-02 NOTE — PROGRESS NOTE ADULT - SUBJECTIVE AND OBJECTIVE BOX
CHIEF COMPLAINT:    Patient is a 57y old  Female who presents with a chief complaint of SOB (2022 10:07)      INTERVAL HPI/OVERNIGHT EVENTS:    Patient seen and examined at bedside. No acute overnight events occurred.    ROS: All other systems are negative.    Medications:  Standing  aspirin enteric coated 81 milliGRAM(s) Oral daily  atorvastatin 80 milliGRAM(s) Oral at bedtime  budesonide 160 MICROgram(s)/formoterol 4.5 MICROgram(s) Inhaler 2 Puff(s) Inhalation two times a day  divalproex  milliGRAM(s) Oral two times a day  enoxaparin Injectable 40 milliGRAM(s) SubCutaneous every 24 hours  influenza   Vaccine 0.5 milliLiter(s) IntraMuscular once  insulin lispro (ADMELOG) corrective regimen sliding scale   SubCutaneous three times a day before meals  insulin lispro Injectable (ADMELOG) 2 Unit(s) SubCutaneous three times a day before meals  linezolid    Tablet 600 milliGRAM(s) Oral every 12 hours  metoprolol succinate  milliGRAM(s) Oral daily  pantoprazole    Tablet 40 milliGRAM(s) Oral before breakfast  polyethylene glycol 3350 17 Gram(s) Oral daily  sacubitril 49 mG/valsartan 51 mG 1 Tablet(s) Oral two times a day  spironolactone 50 milliGRAM(s) Oral daily  torsemide 20 milliGRAM(s) Oral daily    PRN Meds  acetaminophen     Tablet .. 650 milliGRAM(s) Oral every 6 hours PRN  albuterol    90 MICROgram(s) HFA Inhaler 2 Puff(s) Inhalation every 6 hours PRN  loratadine 10 milliGRAM(s) Oral daily PRN  oxycodone    5 mG/acetaminophen 325 mG 1 Tablet(s) Oral once PRN        Vital Signs:    T(F): 97 (22 @ 14:09), Max: 97.7 (22 @ 05:50)  HR: 81 (22 @ 14:09) (67 - 81)  BP: 150/72 (22 @ 14:09) (118/71 - 150/72)  RR: 18 (22 @ 14:09) (18 - 18)  SpO2: 98% (22 @ 05:50) (98% - 98%)  I&O's Summary    01 Dec 2022 07:01  -  02 Dec 2022 07:00  --------------------------------------------------------  IN: 1842 mL / OUT: 2000 mL / NET: -158 mL    02 Dec 2022 07:01  -  02 Dec 2022 18:04  --------------------------------------------------------  IN: 0 mL / OUT: 1 mL / NET: -1 mL      Daily     Daily Weight in k (02 Dec 2022 05:50)  CAPILLARY BLOOD GLUCOSE      POCT Blood Glucose.: 116 mg/dL (02 Dec 2022 16:30)  POCT Blood Glucose.: 108 mg/dL (02 Dec 2022 11:39)  POCT Blood Glucose.: 122 mg/dL (02 Dec 2022 07:33)  POCT Blood Glucose.: 103 mg/dL (01 Dec 2022 22:10)      PHYSICAL EXAM:  GENERAL:  NAD  SKIN: No rashes or lesions  HEENT: Atraumatic. Normocephalic. Anicteric  NECK:  No JVD.   PULMONARY: Clear to ausculation bilaterally. No wheezing. No rales  CVS: Normal S1, S2. Regular rate and rhythm. No murmurs.  ABDOMEN/GI: Soft, Nontender, Nondistended; Bowel sounds are present  EXTREMITIES:  No edema B/L LE.  NEUROLOGIC:  No motor deficit.  PSYCH: Alert & oriented x 3, normal affect      LABS:                        10.8   4.55  )-----------( 187      ( 01 Dec 2022 04:48 )             33.9     12-01    133<L>  |  90<L>  |  22<H>  ----------------------------<  114<H>  4.3   |  34<H>  |  1.4    Ca    9.0      01 Dec 2022 04:48  Mg     1.6     12-    TPro  6.5  /  Alb  3.7  /  TBili  1.3<H>  /  DBili  x   /  AST  18  /  ALT  <5  /  AlkPhos  127<H>  12-01              RADIOLOGY & ADDITIONAL TESTS:  Imaging or report Personally Reviewed:  [ ] YES  [ ] NO -->no new images    Telemetry reviewed independently - NSR, no acute events  EKG reviewed independently -->no new EKGs    Consultant(s) Notes Reviewed:  [ ] YES  [ ] NO  Care Discussed with Consultants/Other Providers [ ] YES  [ ] NO    Case discussed with resident  Care discussed with pt

## 2022-12-03 LAB
ALBUMIN SERPL ELPH-MCNC: 3.8 G/DL — SIGNIFICANT CHANGE UP (ref 3.5–5.2)
ALP SERPL-CCNC: 135 U/L — HIGH (ref 30–115)
ALT FLD-CCNC: 8 U/L — SIGNIFICANT CHANGE UP (ref 0–41)
ANION GAP SERPL CALC-SCNC: 11 MMOL/L — SIGNIFICANT CHANGE UP (ref 7–14)
AST SERPL-CCNC: 24 U/L — SIGNIFICANT CHANGE UP (ref 0–41)
BASOPHILS # BLD AUTO: 0.09 K/UL — SIGNIFICANT CHANGE UP (ref 0–0.2)
BASOPHILS NFR BLD AUTO: 1.7 % — HIGH (ref 0–1)
BILIRUB SERPL-MCNC: 1.3 MG/DL — HIGH (ref 0.2–1.2)
BUN SERPL-MCNC: 28 MG/DL — HIGH (ref 10–20)
CALCIUM SERPL-MCNC: 9.1 MG/DL — SIGNIFICANT CHANGE UP (ref 8.4–10.4)
CHLORIDE SERPL-SCNC: 93 MMOL/L — LOW (ref 98–110)
CO2 SERPL-SCNC: 32 MMOL/L — SIGNIFICANT CHANGE UP (ref 17–32)
CREAT SERPL-MCNC: 1.3 MG/DL — SIGNIFICANT CHANGE UP (ref 0.7–1.5)
EGFR: 49 ML/MIN/1.73M2 — LOW
EOSINOPHIL # BLD AUTO: 0.23 K/UL — SIGNIFICANT CHANGE UP (ref 0–0.7)
EOSINOPHIL NFR BLD AUTO: 4.4 % — SIGNIFICANT CHANGE UP (ref 0–8)
GLUCOSE BLDC GLUCOMTR-MCNC: 100 MG/DL — HIGH (ref 70–99)
GLUCOSE BLDC GLUCOMTR-MCNC: 110 MG/DL — HIGH (ref 70–99)
GLUCOSE BLDC GLUCOMTR-MCNC: 98 MG/DL — SIGNIFICANT CHANGE UP (ref 70–99)
GLUCOSE SERPL-MCNC: 96 MG/DL — SIGNIFICANT CHANGE UP (ref 70–99)
HCT VFR BLD CALC: 32.1 % — LOW (ref 37–47)
HGB BLD-MCNC: 10.3 G/DL — LOW (ref 12–16)
IMM GRANULOCYTES NFR BLD AUTO: 0.4 % — HIGH (ref 0.1–0.3)
LYMPHOCYTES # BLD AUTO: 1.18 K/UL — LOW (ref 1.2–3.4)
LYMPHOCYTES # BLD AUTO: 22.7 % — SIGNIFICANT CHANGE UP (ref 20.5–51.1)
MAGNESIUM SERPL-MCNC: 1.8 MG/DL — SIGNIFICANT CHANGE UP (ref 1.8–2.4)
MCHC RBC-ENTMCNC: 28.4 PG — SIGNIFICANT CHANGE UP (ref 27–31)
MCHC RBC-ENTMCNC: 32.1 G/DL — SIGNIFICANT CHANGE UP (ref 32–37)
MCV RBC AUTO: 88.4 FL — SIGNIFICANT CHANGE UP (ref 81–99)
MONOCYTES # BLD AUTO: 0.69 K/UL — HIGH (ref 0.1–0.6)
MONOCYTES NFR BLD AUTO: 13.3 % — HIGH (ref 1.7–9.3)
NEUTROPHILS # BLD AUTO: 2.98 K/UL — SIGNIFICANT CHANGE UP (ref 1.4–6.5)
NEUTROPHILS NFR BLD AUTO: 57.5 % — SIGNIFICANT CHANGE UP (ref 42.2–75.2)
NRBC # BLD: 0 /100 WBCS — SIGNIFICANT CHANGE UP (ref 0–0)
PLATELET # BLD AUTO: 163 K/UL — SIGNIFICANT CHANGE UP (ref 130–400)
POTASSIUM SERPL-MCNC: 4.8 MMOL/L — SIGNIFICANT CHANGE UP (ref 3.5–5)
POTASSIUM SERPL-SCNC: 4.8 MMOL/L — SIGNIFICANT CHANGE UP (ref 3.5–5)
PROT SERPL-MCNC: 6.8 G/DL — SIGNIFICANT CHANGE UP (ref 6–8)
RBC # BLD: 3.63 M/UL — LOW (ref 4.2–5.4)
RBC # FLD: 17.5 % — HIGH (ref 11.5–14.5)
SODIUM SERPL-SCNC: 136 MMOL/L — SIGNIFICANT CHANGE UP (ref 135–146)
WBC # BLD: 5.19 K/UL — SIGNIFICANT CHANGE UP (ref 4.8–10.8)
WBC # FLD AUTO: 5.19 K/UL — SIGNIFICANT CHANGE UP (ref 4.8–10.8)

## 2022-12-03 PROCEDURE — 71250 CT THORAX DX C-: CPT | Mod: 26

## 2022-12-03 PROCEDURE — 99232 SBSQ HOSP IP/OBS MODERATE 35: CPT

## 2022-12-03 RX ORDER — OXYCODONE HYDROCHLORIDE 5 MG/1
5 TABLET ORAL ONCE
Refills: 0 | Status: DISCONTINUED | OUTPATIENT
Start: 2022-12-03 | End: 2022-12-03

## 2022-12-03 RX ADMIN — DIVALPROEX SODIUM 250 MILLIGRAM(S): 500 TABLET, DELAYED RELEASE ORAL at 17:24

## 2022-12-03 RX ADMIN — SACUBITRIL AND VALSARTAN 1 TABLET(S): 24; 26 TABLET, FILM COATED ORAL at 17:24

## 2022-12-03 RX ADMIN — LORATADINE 10 MILLIGRAM(S): 10 TABLET ORAL at 22:40

## 2022-12-03 RX ADMIN — OXYCODONE HYDROCHLORIDE 5 MILLIGRAM(S): 5 TABLET ORAL at 04:51

## 2022-12-03 RX ADMIN — SACUBITRIL AND VALSARTAN 1 TABLET(S): 24; 26 TABLET, FILM COATED ORAL at 05:23

## 2022-12-03 RX ADMIN — PANTOPRAZOLE SODIUM 40 MILLIGRAM(S): 20 TABLET, DELAYED RELEASE ORAL at 05:23

## 2022-12-03 RX ADMIN — Medication 81 MILLIGRAM(S): at 11:45

## 2022-12-03 RX ADMIN — OXYCODONE HYDROCHLORIDE 5 MILLIGRAM(S): 5 TABLET ORAL at 05:15

## 2022-12-03 RX ADMIN — DIVALPROEX SODIUM 250 MILLIGRAM(S): 500 TABLET, DELAYED RELEASE ORAL at 05:20

## 2022-12-03 RX ADMIN — Medication 100 MILLIGRAM(S): at 05:22

## 2022-12-03 RX ADMIN — Medication 20 MILLIGRAM(S): at 05:24

## 2022-12-03 RX ADMIN — POLYETHYLENE GLYCOL 3350 17 GRAM(S): 17 POWDER, FOR SOLUTION ORAL at 11:43

## 2022-12-03 RX ADMIN — OXYCODONE HYDROCHLORIDE 5 MILLIGRAM(S): 5 TABLET ORAL at 23:09

## 2022-12-03 RX ADMIN — BUDESONIDE AND FORMOTEROL FUMARATE DIHYDRATE 2 PUFF(S): 160; 4.5 AEROSOL RESPIRATORY (INHALATION) at 07:48

## 2022-12-03 RX ADMIN — OXYCODONE HYDROCHLORIDE 5 MILLIGRAM(S): 5 TABLET ORAL at 00:25

## 2022-12-03 RX ADMIN — SPIRONOLACTONE 50 MILLIGRAM(S): 25 TABLET, FILM COATED ORAL at 05:24

## 2022-12-03 RX ADMIN — ENOXAPARIN SODIUM 40 MILLIGRAM(S): 100 INJECTION SUBCUTANEOUS at 16:53

## 2022-12-03 RX ADMIN — OXYCODONE HYDROCHLORIDE 5 MILLIGRAM(S): 5 TABLET ORAL at 01:33

## 2022-12-03 RX ADMIN — ATORVASTATIN CALCIUM 80 MILLIGRAM(S): 80 TABLET, FILM COATED ORAL at 22:40

## 2022-12-03 RX ADMIN — LINEZOLID 600 MILLIGRAM(S): 600 INJECTION, SOLUTION INTRAVENOUS at 05:20

## 2022-12-03 RX ADMIN — Medication 2 UNIT(S): at 11:42

## 2022-12-03 RX ADMIN — Medication 2 UNIT(S): at 16:51

## 2022-12-03 NOTE — PROGRESS NOTE ADULT - ASSESSMENT
56 yo F PMHx COPD on 4 L home O2, CORNELIUS on CPAP, chronic HFrEF (19% on TTE 02/2022, life vest at home), pulmonary HTN, CAD s/p PCI to RCA, HTN, HLD, CVA with residual LLE weakness, DM II, active smoker on home O2, presented to the ED for SOB and LE and UE edema. Pt found to have CHF exacerbation as well as cellulitis.     Patient was discharged yesterday but she refused to go home so [facility] cancelled dc without notifying me. This morning echo lab took patient for echo without an order. They called resident who ordered 2d echo without confirming it with me. This error was fortuitous however since it showed a stable pericardial effusion.    Moderate pericardial effusion  - found to echo AFTER explantation of AICD. Case d/w CT surgery, doubt this is related to procedure but recommended cardio eval  - case d/w CT sx and cardiology--appears pt had smiliar effusion 3/2022  - no evidence of tamponade, hemodynamically stable  - has been tolerating toresmide so will continue for now    Acute on chronic HFrEF exacerbation:    -SOB, leg edema. Pro-BNP 26K (baseline 3-4K).   -Echo 3/2022: 30-35% EF, moderate MR and TR  -s/p  Lasix IV 40mg BID. c/w Torsemide 20mg PO daily.   -Continue Entresto, spironolactone, and Metoprolol.   -BiPAP prn.    Left sided AICD cellulitis pocket abscess:   Bacteremia: coag negative staph,   -Left Upper Extremity cellulitis:   -s/p AICD extraction 11/23/22. Wound care. Plan to place life vest again, patient refused today then again she states she wants it after we decided to discharge the patient (the company has concern about compliance because she didn't return her previous life vest).   -s/p Rocephin and Vancomycin.  I-D recommended Linezolid 600mg BID for 7 days.     Chronic hypoxemic respiratory failure 2/2 COPD on 4 L at baseline   -CORNELIUS on CPAP   -CPAP ordered based on prior setting, PEEP 8 and FiO2 40%  -Continue Symbicort and albuterol prn, continue nasal canula.     CAD s/p PCI  -stent to RCA last cath in hospital 6/21/2021  -Continue Aspirin, Metoprolol and statin    Peripheral vascular disease:   -Patient is c/o cold feet, feet are warm on exam with palpable pulses.   -Arterial duplex showed left minimal flow within the superficial femoral artery and popliteal artery through the peroneal artery below the knee.  -Vascular follow up outpatient.      History of CVA   -Residual left side weakness  -Continue ASA and Lipitor.      DM II  FS goal 110-180, A1C 5.6     Hypothyroidism: TSH 4.7, FT4 1.3 normal, Continue Levothyroxine.     CKD III: Stable     DVT prophylaxis  GI prophylaxis: on Protonix  Code status: Full Code    #Progress Note Handoff:  Pending (specify): placement  Family discussion: discussed effusion  Disposition: Home with home care in 24 hrs.

## 2022-12-03 NOTE — CONSULT NOTE ADULT - ASSESSMENT
ASSESSMENT:  57 year old female with PMH of COPD on 4 L home O2, CORNELIUS on CPAP, HFrEF (19% on TTE 02/2022, life vest at home), pulmonary HTN, CAD s/p PCI to RCA, HTN, HLD, CVA with residual LLE weakness, DM, active smoker on home O2 4L, presented to the ED for SOB and LE and UE edema. Notably, patient was here in 4/2022, but requested to be discharged prior to getting an AICD placed. in the interim, she had the AICD placed at [facility]. Patient began to experience SOB, and swelling in her arms and legs. SOB has worsened over the last 3 days. Pt states that she stopped her diuretic recently. Additionally, she noticed pain and "leaking" from the AICD site since it was placed 2 months ago associated with left UE swelling. Noted pt was also prescribed fluconazole and rifampin for 30 days supply [facility]. She denies fevers, chills, chest pain, or palpitations. Pt states that she was in SNF for 4 months and was discharged home with [facility] 3 weeks ago. Pt is currently dependent on motorized wheelchair and only able to stand on short period using walker. Physical exam findings, imaging, and labs as documented above.     Thoracic surgery consulted for pericardial effusion. patient known to Dr. Snider as he removed an ICD on 11/24/22. Echo done no tamponade, but EF 25% and has CHF.    PLAN:  - CT Chest non-con tonight  - Keep NPO  - Preop for possible drainage procedure  - Dr. Jose Porter will see this morning    Above plan discussed with Attending Surgeon Dr. Jose Porter, patient, patient family, and Primary team  12-03-22 @ 00:09

## 2022-12-03 NOTE — PROGRESS NOTE ADULT - SUBJECTIVE AND OBJECTIVE BOX
CHIEF COMPLAINT:    Patient is a 57y old  Female who presents with a chief complaint of SOB     INTERVAL HPI/OVERNIGHT EVENTS:    Patient seen and examined at bedside. No acute overnight events occurred.    ROS: Denies SOB, chest pain. All other systems are negative.    Medications:  Standing  aspirin enteric coated 81 milliGRAM(s) Oral daily  atorvastatin 80 milliGRAM(s) Oral at bedtime  budesonide 160 MICROgram(s)/formoterol 4.5 MICROgram(s) Inhaler 2 Puff(s) Inhalation two times a day  divalproex  milliGRAM(s) Oral two times a day  enoxaparin Injectable 40 milliGRAM(s) SubCutaneous every 24 hours  influenza   Vaccine 0.5 milliLiter(s) IntraMuscular once  insulin lispro (ADMELOG) corrective regimen sliding scale   SubCutaneous three times a day before meals  insulin lispro Injectable (ADMELOG) 2 Unit(s) SubCutaneous three times a day before meals  metoprolol succinate  milliGRAM(s) Oral daily  pantoprazole    Tablet 40 milliGRAM(s) Oral before breakfast  polyethylene glycol 3350 17 Gram(s) Oral daily  sacubitril 49 mG/valsartan 51 mG 1 Tablet(s) Oral two times a day  spironolactone 50 milliGRAM(s) Oral daily  torsemide 20 milliGRAM(s) Oral daily    PRN Meds  acetaminophen     Tablet .. 650 milliGRAM(s) Oral every 6 hours PRN  albuterol    90 MICROgram(s) HFA Inhaler 2 Puff(s) Inhalation every 6 hours PRN  loratadine 10 milliGRAM(s) Oral daily PRN        Vital Signs:    T(F): 97.3 (12-03-22 @ 13:58), Max: 97.3 (12-03-22 @ 05:00)  HR: 79 (12-03-22 @ 13:58) (66 - 79)  BP: 101/61 (12-03-22 @ 13:58) (96/57 - 132/70)  RR: 18 (12-03-22 @ 13:58) (18 - 20)  SpO2: 99% (12-02-22 @ 21:00) (99% - 99%)  I&O's Summary    02 Dec 2022 07:01  -  03 Dec 2022 07:00  --------------------------------------------------------  IN: 0 mL / OUT: 1 mL / NET: -1 mL    03 Dec 2022 07:01  -  03 Dec 2022 15:30  --------------------------------------------------------  IN: 240 mL / OUT: 650 mL / NET: -410 mL      Daily     Daily   CAPILLARY BLOOD GLUCOSE      POCT Blood Glucose.: 98 mg/dL (03 Dec 2022 11:20)  POCT Blood Glucose.: 100 mg/dL (03 Dec 2022 07:21)  POCT Blood Glucose.: 120 mg/dL (02 Dec 2022 21:37)  POCT Blood Glucose.: 116 mg/dL (02 Dec 2022 16:30)      PHYSICAL EXAM:  GENERAL:  NAD  SKIN: No rashes or lesions  HEENT: Atraumatic. Normocephalic. Anicteric  NECK:  No JVD.   PULMONARY: Clear to ausculation bilaterally. No wheezing. No rales  CVS: Normal S1, S2. Regular rate and rhythm. No murmurs.  ABDOMEN/GI: Soft, Nontender, Nondistended; Bowel sounds are present  EXTREMITIES:  No edema B/L LE.  NEUROLOGIC:  No motor deficit.  PSYCH: Alert & oriented x 3, normal affect      LABS:                        10.3   5.19  )-----------( 163      ( 03 Dec 2022 08:20 )             32.1     12-03    136  |  93<L>  |  28<H>  ----------------------------<  96  4.8   |  32  |  1.3    Ca    9.1      03 Dec 2022 08:20  Mg     1.8     12-03    TPro  6.8  /  Alb  3.8  /  TBili  1.3<H>  /  DBili  x   /  AST  24  /  ALT  8   /  AlkPhos  135<H>  12-03              RADIOLOGY & ADDITIONAL TESTS:  Imaging or report Personally Reviewed:  [ ] YES  [ ] NO -->no new images    Telemetry reviewed independently - NSR, no acute events  EKG reviewed independently -->no new EKGs    Consultant(s) Notes Reviewed:  [ ] YES  [ ] NO  Care Discussed with Consultants/Other Providers [ ] YES  [ ] NO    Case discussed with resident  Care discussed with pt

## 2022-12-03 NOTE — CONSULT NOTE ADULT - SUBJECTIVE AND OBJECTIVE BOX
GENERAL SURGERY CONSULT NOTE    Patient: WILLIAN MARY , 57y ([birth year])Female   MRN: [id number]  Location: [facility] [id number] B  Visit: 11-19-22 Inpatient  Date: 12-03-22 @ 00:09    HPI:  57 year old female with PMH of COPD on 4 L home O2, CORNELIUS on CPAP, HFrEF (19% on TTE 02/2022, life vest at home), pulmonary HTN, CAD s/p PCI to RCA, HTN, HLD, CVA with residual LLE weakness, DM, active smoker on home O2 4L, presented to the ED for SOB and LE and UE edema. Notably, patient was here in 4/2022, but requested to be discharged prior to getting an AICD placed. in the interim, she had the AICD placed at [facility]. Patient began to experience SOB, and swelling in her arms and legs. SOB has worsened over the last 3 days. Pt states that she stopped her diuretic recently. Additionally, she noticed pain and "leaking" from the AICD site since it was placed 2 months ago associated with left UE swelling. Noted pt was also prescribed fluconazole and rifampin for 30 days supply from Oct 19, 2022 from [facility]. She denies fevers, chills, chest pain, or palpitations. Pt states that she was in SNF for 4 months and was discharged home with [facility] 3 weeks ago. Pt is currently dependent on motorized wheelchair and only able to stand on short period using walker.    ED Course:   /98, HR 97.6, 96% on 4L, HR 98  CXR on my read shows bilateral congestion   trop 0.03, BNP 26,000    Patient admitted for CHrEF Exacerbation, and  suspected Cellulitis from AICD site.     Med Rec is not completed; [facility], and reports she does not know any of the meds she takes (even aspirin or water pill), she takes them from an unmarked blister pack; Pharmacy med rec may also be inaccurate, her meds were adjusted recently at a nursing home, per patient. Current meds added from sure [employer] and prior admissions     Thoracic surgery consulted for pericardial effusion. patient known to Dr. Snider as he removed an ICD on 11/24/22. Echo done no tamponade, but EF 25% and has CHF.      PAST MEDICAL & SURGICAL HISTORY:  Hypertension      Hyperlipidemia      Anxiety and depression      COPD, severe      CHF (congestive heart failure)      Cerebrovascular accident (CVA)  Multiple      Type 2 diabetes mellitus      CKD (chronic kidney disease), stage II      No significant past surgical history          Home Medications:  atorvastatin 80 mg oral tablet: 1 tab(s) orally once a day (at bedtime) (16 Feb 2022 13:50)  fenofibrate 145 mg oral tablet: 1 tab(s) orally once a day (16 Feb 2022 13:50)  magnesium oxide 400 mg oral tablet: 1 tab(s) orally 3 times a day (with meals) (14 Jan 2022 12:37)  metoprolol succinate 100 mg oral tablet, extended release: 1 tab(s) orally once a day (19 Nov 2022 11:09)  pantoprazole 40 mg oral delayed release tablet: 1 tab(s) orally once a day (before a meal) (16 Nov 2021 12:10)  Synthroid 25 mcg (0.025 mg) oral tablet: 1 tab(s) orally once a day (19 Nov 2022 11:09)        VITALS:  T(F): 96.8 (12-02-22 @ 21:00), Max: 97.7 (12-02-22 @ 05:50)  HR: 78 (12-02-22 @ 21:00) (69 - 81)  BP: 132/70 (12-02-22 @ 21:00) (118/71 - 150/72)  RR: 20 (12-02-22 @ 21:00) (18 - 20)  SpO2: 99% (12-02-22 @ 21:00) (98% - 99%)    PHYSICAL EXAM:  General: NAD, AAOx3, calm and cooperative  HEENT: NCAT, ERIKA, EOMI, Trachea ML, Neck supple  Cardiac: RRR S1, S2, no Murmurs, rubs or gallops  Respiratory: CTAB, on NC, saturating well, but appears mildly short of breath.   Abdomen: Soft, non-distended, non-tender, no rebound, no guarding.      MEDICATIONS  (STANDING):  aspirin enteric coated 81 milliGRAM(s) Oral daily  atorvastatin 80 milliGRAM(s) Oral at bedtime  budesonide 160 MICROgram(s)/formoterol 4.5 MICROgram(s) Inhaler 2 Puff(s) Inhalation two times a day  divalproex  milliGRAM(s) Oral two times a day  enoxaparin Injectable 40 milliGRAM(s) SubCutaneous every 24 hours  influenza   Vaccine 0.5 milliLiter(s) IntraMuscular once  insulin lispro (ADMELOG) corrective regimen sliding scale   SubCutaneous three times a day before meals  insulin lispro Injectable (ADMELOG) 2 Unit(s) SubCutaneous three times a day before meals  linezolid    Tablet 600 milliGRAM(s) Oral every 12 hours  metoprolol succinate  milliGRAM(s) Oral daily  oxyCODONE    IR 5 milliGRAM(s) Oral once  pantoprazole    Tablet 40 milliGRAM(s) Oral before breakfast  polyethylene glycol 3350 17 Gram(s) Oral daily  sacubitril 49 mG/valsartan 51 mG 1 Tablet(s) Oral two times a day  spironolactone 50 milliGRAM(s) Oral daily  torsemide 20 milliGRAM(s) Oral daily    MEDICATIONS  (PRN):  acetaminophen     Tablet .. 650 milliGRAM(s) Oral every 6 hours PRN Temp greater or equal to 38C (100.4F), Mild Pain (1 - 3)  albuterol    90 MICROgram(s) HFA Inhaler 2 Puff(s) Inhalation every 6 hours PRN Bronchospasm  loratadine 10 milliGRAM(s) Oral daily PRN itchiness      LAB/STUDIES:                        10.8   4.55  )-----------( 187      ( 01 Dec 2022 04:48 )             33.9     12-01    133<L>  |  90<L>  |  22<H>  ----------------------------<  114<H>  4.3   |  34<H>  |  1.4    Ca    9.0      01 Dec 2022 04:48  Mg     1.6     12-01    TPro  6.5  /  Alb  3.7  /  TBili  1.3<H>  /  DBili  x   /  AST  18  /  ALT  <5  /  AlkPhos  127<H>  12-01      LIVER FUNCTIONS - ( 01 Dec 2022 04:48 )  Alb: 3.7 g/dL / Pro: 6.5 g/dL / ALK PHOS: 127 U/L / ALT: <5 U/L / AST: 18 U/L / GGT: x           IMAGING:  < from: TTE Echo Complete w/o Contrast w/ Doppler (12.02.22 @ 09:25) >  Summary:   1. Moderately decreased global left ventricular systolic function with   estimated EF of 25-30% with increased wall thickness.   2. The left ventricular diastolic function could not be assessed in this   study.   3. Mildly enlarged right ventricle (RVEDD = 4.3cm) with moderately   reduced function.   4. Diastolic septal wall flattening consistent with RV volume overload.   5. Mild biatrial enlargement.   6. Mitral valve with bileaflet tethering and resultant mild   regurgitation.   7. Severe tricuspid regurgitation with hepatic vein systolic flow   reversal.   8. Sclerotic aortic valve with normal opening and mild regurgitation.   9. Severe pulmonary hypertension. PASP is at least 65mmHg; this may be   underestimated due to severity of tricuspid regurgitation.  10. Moderate sized pericardial effusion localized posterior tothe right   atrium without echocardiographic evidence of tamponade physiology.  11. Compared to prior study, pericardial effusion size is stable;   findings are overall similar (pHTN severity was likely underestimated on   prior study 2/2 severe TR).    < end of copied text >        ACCESS DEVICES:  [x] Peripheral IV  [ ] Central Venous Line	[ ] R	[ ] L	[ ] IJ	[ ] Fem	[ ] SC	Placed:   [ ] Arterial Line		[ ] R	[ ] L	[ ] Fem	[ ] Rad	[ ] Ax	Placed:   [ ] PICC:					[ ] Mediport  [ ] Urinary Catheter, Date Placed:

## 2022-12-04 LAB
GLUCOSE BLDC GLUCOMTR-MCNC: 156 MG/DL — HIGH (ref 70–99)
GLUCOSE BLDC GLUCOMTR-MCNC: 82 MG/DL — SIGNIFICANT CHANGE UP (ref 70–99)
GLUCOSE BLDC GLUCOMTR-MCNC: 85 MG/DL — SIGNIFICANT CHANGE UP (ref 70–99)
GLUCOSE BLDC GLUCOMTR-MCNC: 92 MG/DL — SIGNIFICANT CHANGE UP (ref 70–99)

## 2022-12-04 PROCEDURE — 99232 SBSQ HOSP IP/OBS MODERATE 35: CPT

## 2022-12-04 RX ORDER — AZITHROMYCIN 500 MG/1
TABLET, FILM COATED ORAL
Refills: 0 | Status: DISCONTINUED | OUTPATIENT
Start: 2022-12-04 | End: 2022-12-04

## 2022-12-04 RX ORDER — AZITHROMYCIN 500 MG/1
500 TABLET, FILM COATED ORAL ONCE
Refills: 0 | Status: COMPLETED | OUTPATIENT
Start: 2022-12-04 | End: 2022-12-04

## 2022-12-04 RX ORDER — DIPHENHYDRAMINE HCL 50 MG
25 CAPSULE ORAL EVERY 6 HOURS
Refills: 0 | Status: DISCONTINUED | OUTPATIENT
Start: 2022-12-04 | End: 2022-12-05

## 2022-12-04 RX ORDER — BACITRACIN ZINC 500 UNIT/G
1 OINTMENT IN PACKET (EA) TOPICAL
Refills: 0 | Status: DISCONTINUED | OUTPATIENT
Start: 2022-12-04 | End: 2022-12-06

## 2022-12-04 RX ORDER — MAGNESIUM SULFATE 500 MG/ML
2 VIAL (ML) INJECTION ONCE
Refills: 0 | Status: COMPLETED | OUTPATIENT
Start: 2022-12-04 | End: 2022-12-04

## 2022-12-04 RX ORDER — MAGNESIUM OXIDE 400 MG ORAL TABLET 241.3 MG
400 TABLET ORAL ONCE
Refills: 0 | Status: COMPLETED | OUTPATIENT
Start: 2022-12-04 | End: 2022-12-04

## 2022-12-04 RX ADMIN — ATORVASTATIN CALCIUM 80 MILLIGRAM(S): 80 TABLET, FILM COATED ORAL at 21:29

## 2022-12-04 RX ADMIN — Medication 25 MILLIGRAM(S): at 17:11

## 2022-12-04 RX ADMIN — DIVALPROEX SODIUM 250 MILLIGRAM(S): 500 TABLET, DELAYED RELEASE ORAL at 17:08

## 2022-12-04 RX ADMIN — SACUBITRIL AND VALSARTAN 1 TABLET(S): 24; 26 TABLET, FILM COATED ORAL at 05:48

## 2022-12-04 RX ADMIN — PANTOPRAZOLE SODIUM 40 MILLIGRAM(S): 20 TABLET, DELAYED RELEASE ORAL at 05:50

## 2022-12-04 RX ADMIN — ENOXAPARIN SODIUM 40 MILLIGRAM(S): 100 INJECTION SUBCUTANEOUS at 17:09

## 2022-12-04 RX ADMIN — AZITHROMYCIN 255 MILLIGRAM(S): 500 TABLET, FILM COATED ORAL at 12:20

## 2022-12-04 RX ADMIN — BUDESONIDE AND FORMOTEROL FUMARATE DIHYDRATE 2 PUFF(S): 160; 4.5 AEROSOL RESPIRATORY (INHALATION) at 21:30

## 2022-12-04 RX ADMIN — Medication 1: at 08:25

## 2022-12-04 RX ADMIN — Medication 2 UNIT(S): at 08:24

## 2022-12-04 RX ADMIN — SPIRONOLACTONE 50 MILLIGRAM(S): 25 TABLET, FILM COATED ORAL at 05:49

## 2022-12-04 RX ADMIN — LORATADINE 10 MILLIGRAM(S): 10 TABLET ORAL at 08:50

## 2022-12-04 RX ADMIN — DIVALPROEX SODIUM 250 MILLIGRAM(S): 500 TABLET, DELAYED RELEASE ORAL at 05:49

## 2022-12-04 RX ADMIN — SACUBITRIL AND VALSARTAN 1 TABLET(S): 24; 26 TABLET, FILM COATED ORAL at 17:08

## 2022-12-04 RX ADMIN — Medication 81 MILLIGRAM(S): at 12:21

## 2022-12-04 RX ADMIN — OXYCODONE HYDROCHLORIDE 5 MILLIGRAM(S): 5 TABLET ORAL at 00:16

## 2022-12-04 RX ADMIN — Medication 2 UNIT(S): at 17:10

## 2022-12-04 RX ADMIN — Medication 1 APPLICATION(S): at 17:13

## 2022-12-04 RX ADMIN — Medication 650 MILLIGRAM(S): at 01:06

## 2022-12-04 RX ADMIN — Medication 650 MILLIGRAM(S): at 02:09

## 2022-12-04 RX ADMIN — Medication 20 MILLIGRAM(S): at 05:48

## 2022-12-04 RX ADMIN — Medication 2 UNIT(S): at 12:03

## 2022-12-04 RX ADMIN — MAGNESIUM OXIDE 400 MG ORAL TABLET 400 MILLIGRAM(S): 241.3 TABLET ORAL at 18:11

## 2022-12-04 NOTE — PROGRESS NOTE ADULT - ASSESSMENT
57 year old female with PMH of COPD on 4 L home O2, CORNELIUS on CPAP, HFrEF (19% on TTE 02/2022, life vest at home), pulmonary HTN, CAD s/p PCI to RCA, HTN, HLD, CVA with residual LLE weakness, DM, active smoker on home O2 4L, presented to the ED for SOB and LE and UE edema. Thoracic surgery consulted for pericardial effusion. patient known to Dr. Snider as he removed an ICD on 11/24/22. Echo done no tamponade, but EF 25% and has CHF.    PLAN:  - Care per primary team  - No acute surgical intervention at this time  - Will discuss with CT team in the AM

## 2022-12-04 NOTE — PROGRESS NOTE ADULT - SUBJECTIVE AND OBJECTIVE BOX
HPI  Patient is a 57y old Female who presents with a chief complaint of SOB (29 Nov 2022 10:07)    Currently admitted to medicine with the primary diagnosis of CHF exacerbation       Today is hospital day 15d.     INTERVAL HPI / OVERNIGHT EVENTS:  Patient was seen and examined at bedside  feels itchy; has wounds in the front of chest; also feels itchy at the AICD pocket area  also has pain in the AICD pocket area  SOB- stable  leg edema improved          PAST MEDICAL & SURGICAL HISTORY  Hypertension    Hyperlipidemia    Anxiety and depression    COPD, severe    CHF (congestive heart failure)    Cerebrovascular accident (CVA)  Multiple    Type 2 diabetes mellitus    CKD (chronic kidney disease), stage II    No significant past surgical history      ALLERGIES  No Known Allergies    MEDICATIONS  STANDING MEDICATIONS  aspirin enteric coated 81 milliGRAM(s) Oral daily  atorvastatin 80 milliGRAM(s) Oral at bedtime  azithromycin  IVPB 500 milliGRAM(s) IV Intermittent once  azithromycin  IVPB      bacitracin   Ointment 1 Application(s) Topical two times a day  budesonide 160 MICROgram(s)/formoterol 4.5 MICROgram(s) Inhaler 2 Puff(s) Inhalation two times a day  divalproex  milliGRAM(s) Oral two times a day  enoxaparin Injectable 40 milliGRAM(s) SubCutaneous every 24 hours  influenza   Vaccine 0.5 milliLiter(s) IntraMuscular once  insulin lispro (ADMELOG) corrective regimen sliding scale   SubCutaneous three times a day before meals  insulin lispro Injectable (ADMELOG) 2 Unit(s) SubCutaneous three times a day before meals  metoprolol succinate  milliGRAM(s) Oral daily  pantoprazole    Tablet 40 milliGRAM(s) Oral before breakfast  polyethylene glycol 3350 17 Gram(s) Oral daily  sacubitril 49 mG/valsartan 51 mG 1 Tablet(s) Oral two times a day  spironolactone 50 milliGRAM(s) Oral daily  torsemide 20 milliGRAM(s) Oral daily    PRN MEDICATIONS  acetaminophen     Tablet .. 650 milliGRAM(s) Oral every 6 hours PRN  albuterol    90 MICROgram(s) HFA Inhaler 2 Puff(s) Inhalation every 6 hours PRN  diphenhydrAMINE 25 milliGRAM(s) Oral every 6 hours PRN  loratadine 10 milliGRAM(s) Oral daily PRN  oxycodone    5 mG/acetaminophen 325 mG 1 Tablet(s) Oral every 8 hours PRN    VITALS:  T(F): 97.9  HR: 60  BP: 110/50  RR: 18  SpO2: 99%    PHYSICAL EXAM  GEN: no distress, comfortable  PULM: BS heard b/l equal, No wheezing, mild basal crepts  CVS: S1S2 present, no rubs or gallops  ABD: Soft, non-distended, no guarding; non-tender  EXT: No lower extremity edema  skin: AICD pocket- covered; right chest area has ulcer  NEURO: A&Ox3, moving all extremities- generalized weakness of lower extremity present left more weak    LABS                        10.3   5.19  )-----------( 163      ( 03 Dec 2022 08:20 )             32.1     12-03    136  |  93<L>  |  28<H>  ----------------------------<  96  4.8   |  32  |  1.3    Ca    9.1      03 Dec 2022 08:20  Mg     1.8     12-03    TPro  6.8  /  Alb  3.8  /  TBili  1.3<H>  /  DBili  x   /  AST  24  /  ALT  8   /  AlkPhos  135<H>  12-03                  RADIOLOGY

## 2022-12-04 NOTE — PROGRESS NOTE ADULT - ASSESSMENT
56 yo F PMHx COPD on 4 L home O2, CORNELIUS on CPAP, chronic HFrEF (19% on TTE 02/2022, life vest at home), pulmonary HTN, CAD s/p PCI to RCA, HTN, HLD, CVA with residual LLE weakness, DM II, active smoker on home O2, presented to the ED for SOB and LE and UE edema. Pt found to have CHF exacerbation as well as cellulitis.       # Moderate pericardial effusion- stable and chronic  CT surgery, cardiology  following;  - no evidence of tamponade, hemodynamically stable  OP cardiology follow up    # Acute on chronic HFrEF exacerbation: improved   echo 12/2: EF 25-30% moderate sized pericardial effusion  -s/p  Lasix IV 40mg BID. c/w Torsemide 20mg PO daily.   -Continue Entresto, spironolactone, and Metoprolol.   - life vest at  bedside; patient planning to start wearing it today. patient was counseled.  -BiPAP prn.    # Left sided AICD cellulitis pocket abscess:   # Bacteremia: coag negative staph,   -Left Upper Extremity cellulitis: improved  -s/p AICD extraction 11/23/22.  continue Wound care.  11/23 S/p explant of AICD with pocket space abscess   11/19 1/3 BCx CNS  11/21,22 BCx NGTD  11/23 surgical swab- enterococcal fecalis  -s/p Rocephin and Vancomycin.  I-D recommended Linezolid 600mg BID for 7 days from 11/23 onwards; completed  - restart on perocet 5 mg/325 q8h PRN    # generalized ithching- benadryl PRN    # Chronic hypoxemic respiratory failure 2/2 COPD on 4 L at baseline   -CORNELIUS on CPAP - continue  -Continue Symbicort and albuterol prn, continue nasal canula.     # CAD s/p PCI  -stent to RCA last cath in hospital 6/21/2021  -Continue Aspirin, Metoprolol and statin    # Peripheral vascular disease:   -Arterial duplex showed left minimal flow within the superficial femoral artery and popliteal artery through the peroneal artery below the knee.  -Vascular follow up outpatient.      # History of CVA   -Residual left side weakness  -Continue ASA and Lipitor.      # DM II  FS goal 110-180, A1C 5.6     # Hypothyroidism: TSH 4.7, FT4 1.3 normal, Continue Levothyroxine.     CKD III: Stable     DVT prophylaxis: lovenox  GI prophylaxis: on Protonix  Code status: Full Code    #Progress Note Handoff:  Pending (specify): prepare for discharge tomorrow  Family discussion: discussed with patient  Disposition: Home with home care in 24 hrs.

## 2022-12-04 NOTE — PROGRESS NOTE ADULT - SUBJECTIVE AND OBJECTIVE BOX
SURGERY PROGRESS NOTE     Patient: WILLIAN MARY , 57y ([birth year])Female   MRN: [id number]  Location: [address]  Visit: 11-19-22 Inpatient  Date: 12-04-22 @ 03:10       Events of past 24 hours:  Patient seen resting comfortably in bed. No acute events overnight. Hemodynamically stable.     PAST MEDICAL & SURGICAL HISTORY:  Hypertension  Hyperlipidemia  Anxiety and depression  COPD, severe  CHF (congestive heart failure)  Cerebrovascular accident (CVA)  Multiple  Type 2 diabetes mellitus      CKD (chronic kidney disease), stage II      No significant past surgical history           Vitals:   T(F): 97.6 (12-03-22 @ 20:17), Max: 97.6 (12-03-22 @ 20:17)  HR: 81 (12-03-22 @ 20:17)  BP: 120/61 (12-03-22 @ 20:17)  RR: 18 (12-03-22 @ 20:17)  SpO2: --      Diet, DASH/TLC:   Sodium & Cholesterol Restricted  Consistent Carbohydrate Evening Snack      Fluids:     I & O's:    12-02-22 @ 07:01  -  12-03-22 @ 07:00  --------------------------------------------------------  IN:  Total IN: 0 mL    OUT:    Bulb (mL): 1 mL  Total OUT: 1 mL    Total NET: -1 mL           PHYSICAL EXAM:   General: NAD, AAOx3, calm and cooperative  Cardiac: RRR S1, S2  Respiratory: CTAB, normal respiratory effort  Abdomen: Soft, non-distended, non-tender, no rebound, no guarding. +BS.    MEDICATIONS  (STANDING):  aspirin enteric coated 81 milliGRAM(s) Oral daily  atorvastatin 80 milliGRAM(s) Oral at bedtime  budesonide 160 MICROgram(s)/formoterol 4.5 MICROgram(s) Inhaler 2 Puff(s) Inhalation two times a day  divalproex  milliGRAM(s) Oral two times a day  enoxaparin Injectable 40 milliGRAM(s) SubCutaneous every 24 hours  influenza   Vaccine 0.5 milliLiter(s) IntraMuscular once  insulin lispro (ADMELOG) corrective regimen sliding scale   SubCutaneous three times a day before meals  insulin lispro Injectable (ADMELOG) 2 Unit(s) SubCutaneous three times a day before meals  metoprolol succinate  milliGRAM(s) Oral daily  pantoprazole    Tablet 40 milliGRAM(s) Oral before breakfast  polyethylene glycol 3350 17 Gram(s) Oral daily  sacubitril 49 mG/valsartan 51 mG 1 Tablet(s) Oral two times a day  spironolactone 50 milliGRAM(s) Oral daily  torsemide 20 milliGRAM(s) Oral daily    MEDICATIONS  (PRN):  acetaminophen     Tablet .. 650 milliGRAM(s) Oral every 6 hours PRN Temp greater or equal to 38C (100.4F), Mild Pain (1 - 3)  albuterol    90 MICROgram(s) HFA Inhaler 2 Puff(s) Inhalation every 6 hours PRN Bronchospasm  loratadine 10 milliGRAM(s) Oral daily PRN itchiness      DVT PROPHYLAXIS: enoxaparin Injectable 40 milliGRAM(s) SubCutaneous every 24 hours    GI PROPHYLAXIS: pantoprazole    Tablet 40 milliGRAM(s) Oral before breakfast    ANTICOAGULATION:   ANTIBIOTICS:            LAB/STUDIES:  Labs:  CAPILLARY BLOOD GLUCOSE      POCT Blood Glucose.: 110 mg/dL (03 Dec 2022 16:41)  POCT Blood Glucose.: 98 mg/dL (03 Dec 2022 11:20)  POCT Blood Glucose.: 100 mg/dL (03 Dec 2022 07:21)                          10.3   5.19  )-----------( 163      ( 03 Dec 2022 08:20 )             32.1       Auto Neutrophil %: 57.5 % (12-03-22 @ 08:20)  Auto Immature Granulocyte %: 0.4 % (12-03-22 @ 08:20)    12-03    136  |  93<L>  |  28<H>  ----------------------------<  96  4.8   |  32  |  1.3      Calcium, Total Serum: 9.1 mg/dL (12-03-22 @ 08:20)      LFTs:             6.8  | 1.3  | 24       ------------------[135     ( 03 Dec 2022 08:20 )  3.8  | x    | 8           Lipase:x      Amylase:x           Radiology  < from: CT Chest No Cont (12.03.22 @ 05:08) >    Since 2/6/2022,    Small pericardial effusion, similar to the prior exam.    Small bilateral pleural effusions and interlobular septal thickening.    Unchanged trace perihepatic ascites.      < end of copied text >

## 2022-12-05 LAB
ANION GAP SERPL CALC-SCNC: 13 MMOL/L — SIGNIFICANT CHANGE UP (ref 7–14)
BASOPHILS # BLD AUTO: 0.12 K/UL — SIGNIFICANT CHANGE UP (ref 0–0.2)
BASOPHILS NFR BLD AUTO: 2.8 % — HIGH (ref 0–1)
BUN SERPL-MCNC: 39 MG/DL — HIGH (ref 10–20)
CALCIUM SERPL-MCNC: 9.3 MG/DL — SIGNIFICANT CHANGE UP (ref 8.4–10.5)
CHLORIDE SERPL-SCNC: 94 MMOL/L — LOW (ref 98–110)
CO2 SERPL-SCNC: 29 MMOL/L — SIGNIFICANT CHANGE UP (ref 17–32)
CREAT SERPL-MCNC: 1.4 MG/DL — SIGNIFICANT CHANGE UP (ref 0.7–1.5)
EGFR: 44 ML/MIN/1.73M2 — LOW
EOSINOPHIL # BLD AUTO: 0.2 K/UL — SIGNIFICANT CHANGE UP (ref 0–0.7)
EOSINOPHIL NFR BLD AUTO: 4.6 % — SIGNIFICANT CHANGE UP (ref 0–8)
GLUCOSE BLDC GLUCOMTR-MCNC: 100 MG/DL — HIGH (ref 70–99)
GLUCOSE BLDC GLUCOMTR-MCNC: 103 MG/DL — HIGH (ref 70–99)
GLUCOSE BLDC GLUCOMTR-MCNC: 113 MG/DL — HIGH (ref 70–99)
GLUCOSE BLDC GLUCOMTR-MCNC: 73 MG/DL — SIGNIFICANT CHANGE UP (ref 70–99)
GLUCOSE SERPL-MCNC: 72 MG/DL — SIGNIFICANT CHANGE UP (ref 70–99)
HCT VFR BLD CALC: 32.7 % — LOW (ref 37–47)
HGB BLD-MCNC: 10.4 G/DL — LOW (ref 12–16)
IMM GRANULOCYTES NFR BLD AUTO: 0.2 % — SIGNIFICANT CHANGE UP (ref 0.1–0.3)
LYMPHOCYTES # BLD AUTO: 1.03 K/UL — LOW (ref 1.2–3.4)
LYMPHOCYTES # BLD AUTO: 23.8 % — SIGNIFICANT CHANGE UP (ref 20.5–51.1)
MAGNESIUM SERPL-MCNC: 1.5 MG/DL — LOW (ref 1.8–2.4)
MCHC RBC-ENTMCNC: 28.5 PG — SIGNIFICANT CHANGE UP (ref 27–31)
MCHC RBC-ENTMCNC: 31.8 G/DL — LOW (ref 32–37)
MCV RBC AUTO: 89.6 FL — SIGNIFICANT CHANGE UP (ref 81–99)
MONOCYTES # BLD AUTO: 0.64 K/UL — HIGH (ref 0.1–0.6)
MONOCYTES NFR BLD AUTO: 14.8 % — HIGH (ref 1.7–9.3)
NEUTROPHILS # BLD AUTO: 2.32 K/UL — SIGNIFICANT CHANGE UP (ref 1.4–6.5)
NEUTROPHILS NFR BLD AUTO: 53.8 % — SIGNIFICANT CHANGE UP (ref 42.2–75.2)
NRBC # BLD: 0 /100 WBCS — SIGNIFICANT CHANGE UP (ref 0–0)
PLATELET # BLD AUTO: 121 K/UL — LOW (ref 130–400)
POTASSIUM SERPL-MCNC: 4.9 MMOL/L — SIGNIFICANT CHANGE UP (ref 3.5–5)
POTASSIUM SERPL-SCNC: 4.9 MMOL/L — SIGNIFICANT CHANGE UP (ref 3.5–5)
RBC # BLD: 3.65 M/UL — LOW (ref 4.2–5.4)
RBC # FLD: 17.5 % — HIGH (ref 11.5–14.5)
SODIUM SERPL-SCNC: 136 MMOL/L — SIGNIFICANT CHANGE UP (ref 135–146)
WBC # BLD: 4.32 K/UL — LOW (ref 4.8–10.8)
WBC # FLD AUTO: 4.32 K/UL — LOW (ref 4.8–10.8)

## 2022-12-05 PROCEDURE — 99232 SBSQ HOSP IP/OBS MODERATE 35: CPT

## 2022-12-05 RX ORDER — ACETAMINOPHEN 500 MG
650 TABLET ORAL EVERY 8 HOURS
Refills: 0 | Status: DISCONTINUED | OUTPATIENT
Start: 2022-12-05 | End: 2022-12-06

## 2022-12-05 RX ORDER — OXYCODONE HYDROCHLORIDE 5 MG/1
5 TABLET ORAL ONCE
Refills: 0 | Status: DISCONTINUED | OUTPATIENT
Start: 2022-12-05 | End: 2022-12-05

## 2022-12-05 RX ORDER — MAGNESIUM OXIDE 400 MG ORAL TABLET 241.3 MG
400 TABLET ORAL
Refills: 0 | Status: DISCONTINUED | OUTPATIENT
Start: 2022-12-05 | End: 2022-12-06

## 2022-12-05 RX ORDER — MAGNESIUM SULFATE 500 MG/ML
2 VIAL (ML) INJECTION ONCE
Refills: 0 | Status: COMPLETED | OUTPATIENT
Start: 2022-12-05 | End: 2022-12-05

## 2022-12-05 RX ORDER — BACITRACIN ZINC 500 UNIT/G
1 OINTMENT IN PACKET (EA) TOPICAL DAILY
Refills: 0 | Status: DISCONTINUED | OUTPATIENT
Start: 2022-12-05 | End: 2022-12-06

## 2022-12-05 RX ORDER — DIPHENHYDRAMINE HCL 50 MG
50 CAPSULE ORAL EVERY 6 HOURS
Refills: 0 | Status: DISCONTINUED | OUTPATIENT
Start: 2022-12-05 | End: 2022-12-06

## 2022-12-05 RX ORDER — OXYCODONE HYDROCHLORIDE 5 MG/1
10 TABLET ORAL EVERY 6 HOURS
Refills: 0 | Status: DISCONTINUED | OUTPATIENT
Start: 2022-12-05 | End: 2022-12-06

## 2022-12-05 RX ADMIN — SACUBITRIL AND VALSARTAN 1 TABLET(S): 24; 26 TABLET, FILM COATED ORAL at 05:16

## 2022-12-05 RX ADMIN — Medication 25 MILLIGRAM(S): at 13:31

## 2022-12-05 RX ADMIN — OXYCODONE HYDROCHLORIDE 5 MILLIGRAM(S): 5 TABLET ORAL at 14:38

## 2022-12-05 RX ADMIN — PANTOPRAZOLE SODIUM 40 MILLIGRAM(S): 20 TABLET, DELAYED RELEASE ORAL at 05:14

## 2022-12-05 RX ADMIN — MAGNESIUM OXIDE 400 MG ORAL TABLET 400 MILLIGRAM(S): 241.3 TABLET ORAL at 17:20

## 2022-12-05 RX ADMIN — ATORVASTATIN CALCIUM 80 MILLIGRAM(S): 80 TABLET, FILM COATED ORAL at 21:40

## 2022-12-05 RX ADMIN — ENOXAPARIN SODIUM 40 MILLIGRAM(S): 100 INJECTION SUBCUTANEOUS at 17:20

## 2022-12-05 RX ADMIN — OXYCODONE HYDROCHLORIDE 10 MILLIGRAM(S): 5 TABLET ORAL at 21:39

## 2022-12-05 RX ADMIN — Medication 1 APPLICATION(S): at 05:14

## 2022-12-05 RX ADMIN — Medication 20 MILLIGRAM(S): at 05:16

## 2022-12-05 RX ADMIN — Medication 81 MILLIGRAM(S): at 12:17

## 2022-12-05 RX ADMIN — OXYCODONE HYDROCHLORIDE 5 MILLIGRAM(S): 5 TABLET ORAL at 16:04

## 2022-12-05 RX ADMIN — Medication 1 APPLICATION(S): at 21:39

## 2022-12-05 RX ADMIN — Medication 650 MILLIGRAM(S): at 16:03

## 2022-12-05 RX ADMIN — Medication 650 MILLIGRAM(S): at 14:38

## 2022-12-05 RX ADMIN — DIVALPROEX SODIUM 250 MILLIGRAM(S): 500 TABLET, DELAYED RELEASE ORAL at 17:20

## 2022-12-05 RX ADMIN — Medication 25 MILLIGRAM(S): at 03:04

## 2022-12-05 RX ADMIN — Medication 1 APPLICATION(S): at 18:28

## 2022-12-05 RX ADMIN — Medication 650 MILLIGRAM(S): at 21:41

## 2022-12-05 RX ADMIN — DIVALPROEX SODIUM 250 MILLIGRAM(S): 500 TABLET, DELAYED RELEASE ORAL at 05:15

## 2022-12-05 RX ADMIN — Medication 50 MILLIGRAM(S): at 21:39

## 2022-12-05 RX ADMIN — SACUBITRIL AND VALSARTAN 1 TABLET(S): 24; 26 TABLET, FILM COATED ORAL at 17:20

## 2022-12-05 RX ADMIN — Medication 100 MILLIGRAM(S): at 05:15

## 2022-12-05 RX ADMIN — SPIRONOLACTONE 50 MILLIGRAM(S): 25 TABLET, FILM COATED ORAL at 05:14

## 2022-12-05 NOTE — PROGRESS NOTE ADULT - SUBJECTIVE AND OBJECTIVE BOX
Patient is a 57y old  Female who presents with a chief complaint of SOB (29 Nov 2022 10:07)      HPI:  57 year old female with PMH of COPD on 4 L home O2, CORNELIUS on CPAP, HFrEF (19% on TTE 02/2022, life vest at home), pulmonary HTN, CAD s/p PCI to RCA, HTN, HLD, CVA with residual LLE weakness, DM, active smoker on home O2 4L, presented to the ED for SOB and LE and UE edema. Notably, patient was here in 4/2022, but requested to be discharged prior to getting an AICD placed. in the interim, she had the AICD placed at [facility]. Patient began to experience SOB, and swelling in her arms and legs. SOB has worsened over the last 3 days. Pt states that she stopped her diuretic recently. Additionally, she noticed pain and "leaking" from the AICD site since it was placed 2 months ago associated with left UE swelling. Noted pt was also prescribed fluconazole and rifampin for 30 days supply from Oct 19, 2022 from [employer]. She denies fevers, chills, chest pain, or palpitations. Pt states that she was in SNF for 4 months and was discharged home with [facility] 3 weeks ago. Pt is currently dependent on motorized wheelchair and only able to stand on short period using walker.    ED Course:   /98, HR 97.6, 96% on 4L, HR 98  CXR on my read shows bilateral congestion   trop 0.03, BNP 26,000    Patient admitted for CHrEF Exacerbation, and  suspected Cellulitis from AICD site.     Med Rec is not completed; [facility], and reports she does not know any of the meds she takes (even aspirin or water pill), she takes them from an unmarked blister pack; Pharmacy med rec may also be inaccurate, her meds were adjusted recently at a nursing home, per patient. Current meds added from [facility] [employer] and prior admissions          (19 Nov 2022 10:42)      PAST MEDICAL & SURGICAL HISTORY:  Hypertension      Hyperlipidemia      Anxiety and depression      COPD, severe      CHF (congestive heart failure)      Cerebrovascular accident (CVA)  Multiple      Type 2 diabetes mellitus      CKD (chronic kidney disease), stage II      No significant past surgical history          Home Medications:  atorvastatin 80 mg oral tablet: 1 tab(s) orally once a day (at bedtime) (16 Feb 2022 13:50)  fenofibrate 145 mg oral tablet: 1 tab(s) orally once a day (16 Feb 2022 13:50)  magnesium oxide 400 mg oral tablet: 1 tab(s) orally 3 times a day (with meals) (14 Jan 2022 12:37)  metoprolol succinate 100 mg oral tablet, extended release: 1 tab(s) orally once a day (19 Nov 2022 11:09)  pantoprazole 40 mg oral delayed release tablet: 1 tab(s) orally once a day (before a meal) (16 Nov 2021 12:10)  Synthroid 25 mcg (0.025 mg) oral tablet: 1 tab(s) orally once a day (19 Nov 2022 11:09)      .  Tobacco use:   EtOH use:  Illicit drug use:    Living situation:    Allergies:  No Known Allergies      FAMILY HISTORY:  FH: diabetes mellitus (Father, Mother)        Hospital Medications:  acetaminophen     Tablet .. 650 milliGRAM(s) Oral every 8 hours  albuterol    90 MICROgram(s) HFA Inhaler 2 Puff(s) Inhalation every 6 hours PRN  aspirin enteric coated 81 milliGRAM(s) Oral daily  atorvastatin 80 milliGRAM(s) Oral at bedtime  bacitracin   Ointment 1 Application(s) Topical two times a day  bacitracin   Ointment 1 Application(s) Topical daily  budesonide 160 MICROgram(s)/formoterol 4.5 MICROgram(s) Inhaler 2 Puff(s) Inhalation two times a day  diphenhydrAMINE 50 milliGRAM(s) Oral every 6 hours PRN  divalproex  milliGRAM(s) Oral two times a day  enoxaparin Injectable 40 milliGRAM(s) SubCutaneous every 24 hours  influenza   Vaccine 0.5 milliLiter(s) IntraMuscular once  insulin lispro (ADMELOG) corrective regimen sliding scale   SubCutaneous three times a day before meals  insulin lispro Injectable (ADMELOG) 2 Unit(s) SubCutaneous three times a day before meals  loratadine 10 milliGRAM(s) Oral daily PRN  magnesium sulfate  IVPB 2 Gram(s) IV Intermittent once  metoprolol succinate  milliGRAM(s) Oral daily  oxycodone    5 mG/acetaminophen 325 mG 1 Tablet(s) Oral every 8 hours PRN  pantoprazole    Tablet 40 milliGRAM(s) Oral before breakfast  polyethylene glycol 3350 17 Gram(s) Oral daily  sacubitril 49 mG/valsartan 51 mG 1 Tablet(s) Oral two times a day  spironolactone 50 milliGRAM(s) Oral daily  torsemide 20 milliGRAM(s) Oral daily      Vital Signs Last 24 Hrs  T(C): 36.3 (05 Dec 2022 12:59), Max: 36.9 (05 Dec 2022 04:05)  T(F): 97.4 (05 Dec 2022 12:59), Max: 98.5 (05 Dec 2022 04:05)  HR: 82 (05 Dec 2022 12:59) (82 - 92)  BP: 138/82 (05 Dec 2022 12:59) (119/69 - 138/82)  BP(mean): --  RR: 18 (05 Dec 2022 12:59) (18 - 20)  SpO2: 96% (04 Dec 2022 23:14) (96% - 96%)    Parameters below as of 04 Dec 2022 23:14  Patient On (Oxygen Delivery Method): nasal cannula  O2 Flow (L/min): 4      I&O's Summary    04 Dec 2022 07:01  -  05 Dec 2022 07:00  --------------------------------------------------------  IN: 443 mL / OUT: 0 mL / NET: 443 mL    05 Dec 2022 07:01  -  05 Dec 2022 15:50  --------------------------------------------------------  IN: 0 mL / OUT: 451 mL / NET: -451 mL        LABS:                        10.4   4.32  )-----------( 121      ( 05 Dec 2022 07:33 )             32.7       12-05    136  |  94<L>  |  39<H>  ----------------------------<  72  4.9   |  29  |  1.4    Ca    9.3      05 Dec 2022 07:33  Mg     1.5     12-05                  PHYSICAL EXAM:  GENERAL: NAD, lying in bed comfortably  HEAD:  Atraumatic, Normocephalic  EYES: EOMI, PERRLA, conjunctiva and sclera clear  ENT: Moist mucous membranes  NECK: Supple, No JVD  CHEST/LUNG: Clear to auscultation bilaterally; No rales, rhonchi, wheezing, or rubs. Unlabored respirations  HEART: Regular rate and rhythm; No murmurs, rubs, or gallops  ABDOMEN: Bowel sounds present; Soft, Nontender, Nondistended. No hepatomegally  EXTREMITIES:  2+ Peripheral Pulses, brisk capillary refill. No clubbing, cyanosis, or edema  NERVOUS SYSTEM:  Alert & Oriented X3, speech clear. No deficits   MSK: FROM all 4 extremities, full and equal strength  SKIN: No rashes or lesions    IMAGES:

## 2022-12-05 NOTE — PROGRESS NOTE ADULT - ASSESSMENT
58 yo F PMHx COPD on 4 L home O2, CORNELIUS on CPAP, chronic HFrEF (19% on TTE 02/2022, life vest at home), pulmonary HTN, CAD s/p PCI to RCA, HTN, HLD, CVA with residual LLE weakness, DM II, active smoker on home O2, presented to the ED for SOB and LE and UE edema. Pt found to have CHF exacerbation as well as cellulitis.       # Moderate pericardial effusion- stable and chronic  CT surgery, cardiology  following;  - no evidence of tamponade, hemodynamically stable  OP cardiology follow up    # Acute on chronic HFrEF exacerbation: improved   echo 12/2: EF 25-30% moderate sized pericardial effusion  -s/p  Lasix IV 40mg BID. c/w Torsemide 20mg PO daily.   -Continue Entresto, spironolactone, and Metoprolol.   - life vest at  bedside; patient planning to start wearing it today. patient was counseled.  -BiPAP prn.    # Left sided AICD cellulitis pocket abscess:   # Bacteremia: coag negative staph,   -Left Upper Extremity cellulitis: improved  -s/p AICD extraction 11/23/22.  continue Wound care.  11/23 S/p explant of AICD with pocket space abscess   11/19 1/3 BCx CNS  11/21,22 BCx NGTD  11/23 surgical swab- enterococcal fecalis  -s/p Rocephin and Vancomycin.  I-D recommended Linezolid 600mg BID for 7 days from 11/23 onwards; completed  - restart on perocet 5 mg/325 q8h PRN    # generalized ithching- benadryl PRN    # Chronic hypoxemic respiratory failure 2/2 COPD on 4 L at baseline   -CORNELIUS on CPAP - continue  -Continue Symbicort and albuterol prn, continue nasal canula.     # CAD s/p PCI  -stent to RCA last cath in hospital 6/21/2021  -Continue Aspirin, Metoprolol and statin    # Peripheral vascular disease:   -Arterial duplex showed left minimal flow within the superficial femoral artery and popliteal artery through the peroneal artery below the knee.  -Vascular follow up outpatient.      # History of CVA   -Residual left side weakness  -Continue ASA and Lipitor.      # DM II  FS goal 110-180, A1C 5.6     # Hypothyroidism: TSH 4.7, FT4 1.3 normal, Continue Levothyroxine.     CKD III: Stable     DVT prophylaxis: lovenox  GI prophylaxis: on Protonix  Code status: Full Code    #Progress Note Handoff:  Pending (specify): prepare for discharge tomorrow  Family discussion: discussed with patient  Disposition: Home with home care in 24 hrs.

## 2022-12-05 NOTE — PROGRESS NOTE ADULT - ATTENDING COMMENTS
Patient seen and examined. Case discussed with resident physician, nursing staff and case management.     58 yo F PMHx COPD on 4 L home O2, CORNELIUS on CPAP, chronic HFrEF (19% on TTE 02/2022, life vest at home), pulmonary HTN, CAD s/p PCI to RCA, HTN, HLD, CVA with residual LLE weakness, DM II, active smoker on home O2, presented to the ED for SOB and LE and UE edema. Pt found to have CHF exacerbation as well as cellulitis.     S: c/p pain all over- patient c/o pain in upper chest ; mostly in shoulders and back  also states has severe itching and wasn't able to sleep last night    #    # Moderate pericardial effusion- stable and chronic  CT surgery, cardiology  following;  - no evidence of tamponade, hemodynamically stable  OP cardiology follow up    # Acute on chronic HFrEF exacerbation: improved   echo 12/2: EF 25-30% moderate sized pericardial effusion  -s/p  Lasix IV 40mg BID. c/w Torsemide 20mg PO daily.   -Continue Entresto, spironolactone, and Metoprolol.   - life vest at  bedside; patient planning to start wearing it today. patient was counseled.  -BiPAP prn.    # Left sided AICD cellulitis pocket abscess:   # Bacteremia: coag negative staph,   -Left Upper Extremity cellulitis: improved  -s/p AICD extraction 11/23/22.  continue Wound care.  11/23 S/p explant of AICD with pocket space abscess   11/19 1/3 BCx CNS  11/21,22 BCx NGTD  11/23 surgical swab- enterococcal fecalis  -s/p Rocephin and Vancomycin.  I-D recommended Linezolid 600mg BID for 7 days from 11/23 onwards; completed  - restart on perocet 5 mg/325 q8h PRN; given additional dose for uncontrolled pain- monitor    # generalized ithching- benadryl- increase to 50 mg PRN    # Chronic hypoxemic respiratory failure 2/2 COPD on 4 L at baseline   -CORNELIUS on CPAP - continue  -Continue Symbicort and albuterol prn, continue nasal canula.     # CAD s/p PCI  -stent to RCA last cath in hospital 6/21/2021  -Continue Aspirin, Metoprolol and statin    # Peripheral vascular disease:   -Arterial duplex showed left minimal flow within the superficial femoral artery and popliteal artery through the peroneal artery below the knee.  -Vascular follow up outpatient.      # History of CVA   -Residual left side weakness  -Continue ASA and Lipitor.      # DM II  FS goal 110-180, A1C 5.6     # Hypothyroidism: TSH 4.7, FT4 1.3 normal, Continue Levothyroxine.     CKD III: Stable     DVT prophylaxis: lovenox  GI prophylaxis: on Protonix  Code status: Full Code    #Progress Note Handoff:  Pending (specify): prepare for discharge tomorrow; patient has issues with aide arrangements; case management following  Family discussion: discussed with patient  Disposition: Home with home care in 24 hrs.

## 2022-12-06 VITALS
HEART RATE: 72 BPM | RESPIRATION RATE: 20 BRPM | TEMPERATURE: 97 F | DIASTOLIC BLOOD PRESSURE: 78 MMHG | SYSTOLIC BLOOD PRESSURE: 132 MMHG

## 2022-12-06 LAB
GLUCOSE BLDC GLUCOMTR-MCNC: 121 MG/DL — HIGH (ref 70–99)
GLUCOSE BLDC GLUCOMTR-MCNC: 91 MG/DL — SIGNIFICANT CHANGE UP (ref 70–99)
GLUCOSE BLDC GLUCOMTR-MCNC: 94 MG/DL — SIGNIFICANT CHANGE UP (ref 70–99)

## 2022-12-06 PROCEDURE — 99232 SBSQ HOSP IP/OBS MODERATE 35: CPT

## 2022-12-06 RX ORDER — METFORMIN HYDROCHLORIDE 850 MG/1
1 TABLET ORAL
Qty: 60 | Refills: 0
Start: 2022-12-06 | End: 2023-01-04

## 2022-12-06 RX ORDER — DIPHENHYDRAMINE HCL 50 MG
1 CAPSULE ORAL
Qty: 15 | Refills: 0
Start: 2022-12-06 | End: 2022-12-10

## 2022-12-06 RX ORDER — OXYCODONE HYDROCHLORIDE 5 MG/1
1 TABLET ORAL
Qty: 12 | Refills: 0
Start: 2022-12-06 | End: 2022-12-08

## 2022-12-06 RX ORDER — ACETAMINOPHEN 500 MG
2 TABLET ORAL
Qty: 18 | Refills: 0
Start: 2022-12-06 | End: 2022-12-08

## 2022-12-06 RX ADMIN — OXYCODONE HYDROCHLORIDE 10 MILLIGRAM(S): 5 TABLET ORAL at 05:15

## 2022-12-06 RX ADMIN — Medication 1 APPLICATION(S): at 05:17

## 2022-12-06 RX ADMIN — Medication 650 MILLIGRAM(S): at 14:01

## 2022-12-06 RX ADMIN — DIVALPROEX SODIUM 250 MILLIGRAM(S): 500 TABLET, DELAYED RELEASE ORAL at 05:13

## 2022-12-06 RX ADMIN — SPIRONOLACTONE 50 MILLIGRAM(S): 25 TABLET, FILM COATED ORAL at 05:17

## 2022-12-06 RX ADMIN — MAGNESIUM OXIDE 400 MG ORAL TABLET 400 MILLIGRAM(S): 241.3 TABLET ORAL at 08:59

## 2022-12-06 RX ADMIN — Medication 2 UNIT(S): at 08:59

## 2022-12-06 RX ADMIN — Medication 1 APPLICATION(S): at 11:39

## 2022-12-06 RX ADMIN — Medication 650 MILLIGRAM(S): at 05:13

## 2022-12-06 RX ADMIN — Medication 50 MILLIGRAM(S): at 05:14

## 2022-12-06 RX ADMIN — MAGNESIUM OXIDE 400 MG ORAL TABLET 400 MILLIGRAM(S): 241.3 TABLET ORAL at 11:39

## 2022-12-06 RX ADMIN — Medication 100 MILLIGRAM(S): at 05:13

## 2022-12-06 RX ADMIN — Medication 2 UNIT(S): at 11:38

## 2022-12-06 RX ADMIN — Medication 20 MILLIGRAM(S): at 05:16

## 2022-12-06 RX ADMIN — SACUBITRIL AND VALSARTAN 1 TABLET(S): 24; 26 TABLET, FILM COATED ORAL at 05:18

## 2022-12-06 RX ADMIN — BUDESONIDE AND FORMOTEROL FUMARATE DIHYDRATE 2 PUFF(S): 160; 4.5 AEROSOL RESPIRATORY (INHALATION) at 08:59

## 2022-12-06 RX ADMIN — PANTOPRAZOLE SODIUM 40 MILLIGRAM(S): 20 TABLET, DELAYED RELEASE ORAL at 06:06

## 2022-12-06 RX ADMIN — POLYETHYLENE GLYCOL 3350 17 GRAM(S): 17 POWDER, FOR SOLUTION ORAL at 11:39

## 2022-12-06 RX ADMIN — Medication 81 MILLIGRAM(S): at 11:39

## 2022-12-06 NOTE — PROGRESS NOTE ADULT - SUBJECTIVE AND OBJECTIVE BOX
HPI  Patient is a 57y old Female who presents with a chief complaint of SOB (29 Nov 2022 10:07)    Currently admitted to medicine with the primary diagnosis of CHF exacerbation       Today is hospital day 17d.     INTERVAL HPI / OVERNIGHT EVENTS:  Patient was seen and examined at bedside    Patient Feels better  No new complaints  Denies any complains of chest pain or shortness of breath  Denies any abdominal pain/nausea/vomiting        PAST MEDICAL & SURGICAL HISTORY  Hypertension    Hyperlipidemia    Anxiety and depression    COPD, severe    CHF (congestive heart failure)    Cerebrovascular accident (CVA)  Multiple    Type 2 diabetes mellitus    CKD (chronic kidney disease), stage II    No significant past surgical history      ALLERGIES  No Known Allergies    MEDICATIONS  STANDING MEDICATIONS  acetaminophen     Tablet .. 650 milliGRAM(s) Oral every 8 hours  aspirin enteric coated 81 milliGRAM(s) Oral daily  atorvastatin 80 milliGRAM(s) Oral at bedtime  bacitracin   Ointment 1 Application(s) Topical daily  bacitracin   Ointment 1 Application(s) Topical two times a day  budesonide 160 MICROgram(s)/formoterol 4.5 MICROgram(s) Inhaler 2 Puff(s) Inhalation two times a day  divalproex  milliGRAM(s) Oral two times a day  enoxaparin Injectable 40 milliGRAM(s) SubCutaneous every 24 hours  influenza   Vaccine 0.5 milliLiter(s) IntraMuscular once  insulin lispro (ADMELOG) corrective regimen sliding scale   SubCutaneous three times a day before meals  insulin lispro Injectable (ADMELOG) 2 Unit(s) SubCutaneous three times a day before meals  magnesium oxide 400 milliGRAM(s) Oral three times a day with meals  metoprolol succinate  milliGRAM(s) Oral daily  pantoprazole    Tablet 40 milliGRAM(s) Oral before breakfast  polyethylene glycol 3350 17 Gram(s) Oral daily  sacubitril 49 mG/valsartan 51 mG 1 Tablet(s) Oral two times a day  spironolactone 50 milliGRAM(s) Oral daily  torsemide 20 milliGRAM(s) Oral daily    PRN MEDICATIONS  albuterol    90 MICROgram(s) HFA Inhaler 2 Puff(s) Inhalation every 6 hours PRN  diphenhydrAMINE 50 milliGRAM(s) Oral every 6 hours PRN  loratadine 10 milliGRAM(s) Oral daily PRN  oxyCODONE    IR 10 milliGRAM(s) Oral every 6 hours PRN    VITALS:  T(F): 96.8  HR: 72  BP: 132/78  RR: 20  SpO2: 98%    PHYSICAL EXAM  GEN: no distress, comfortable  PULM: BS heard b/l equal, No wheezing  CVS: S1S2 present, no rubs or gallops  ABD: Soft, non-distended, no guarding; non-tender  EXT: No lower extremity edema  NEURO: A&Ox3, moving all extremities    LABS                        10.4   4.32  )-----------( 121      ( 05 Dec 2022 07:33 )             32.7     12-05    136  |  94<L>  |  39<H>  ----------------------------<  72  4.9   |  29  |  1.4    Ca    9.3      05 Dec 2022 07:33  Mg     1.5     12-05                    RADIOLOGY

## 2022-12-06 NOTE — PROGRESS NOTE ADULT - PROVIDER SPECIALTY LIST ADULT
CT Surgery
Internal Medicine
Electrophysiology
Hospitalist
Infectious Disease
Internal Medicine
Thoracic Surgery
Hospitalist
Hospitalist
Internal Medicine

## 2022-12-06 NOTE — PROGRESS NOTE ADULT - ASSESSMENT
Patient seen and examined. Case discussed with resident physician, nursing staff and case management.       56 yo F PMHx COPD on 4 L home O2, CORNELIUS on CPAP, chronic HFrEF (19% on TTE 02/2022, life vest at home), pulmonary HTN, CAD s/p PCI to RCA, HTN, HLD, CVA with residual LLE weakness, DM II, active smoker on home O2, presented to the ED for SOB and LE and UE edema. Pt found to have CHF exacerbation as well as cellulitis.       # Moderate pericardial effusion- stable and chronic  CT surgery, cardiology  following;  - no evidence of tamponade, hemodynamically stable  OP cardiology follow up    # Acute on chronic HFrEF exacerbation: improved   echo 12/2: EF 25-30% moderate sized pericardial effusion  -s/p  Lasix IV 40mg BID. c/w Torsemide 20mg PO daily.   -Continue Entresto, spironolactone, and Metoprolol.   - life vest at  bedside;  patient  counseled on use.    # Left sided AICD cellulitis pocket abscess:   # Bacteremia: coag negative staph,   -Left Upper Extremity cellulitis: improved  -s/p AICD extraction 11/23/22.  continue Wound care.  11/23 S/p explant of AICD with pocket space abscess   11/19 1/3 BCx CNS  11/21,22 BCx NGTD  11/23 surgical swab- enterococcal fecalis  -s/p Rocephin and Vancomycin.  I-D recommended Linezolid 600mg BID for 7 days from 11/23 onwards; completed  - restart on perocet 5 mg/325 q8h PRN; given additional dose for uncontrolled pain- monitor    # generalized ithching- benadryl- increase to 50 mg PRN    # Chronic hypoxemic respiratory failure 2/2 COPD on 4 L at baseline   -CORNELIUS on CPAP - continue  -Continue Symbicort and albuterol prn, continue nasal canula.     # CAD s/p PCI  -stent to RCA last cath in hospital 6/21/2021  -Continue Aspirin, Metoprolol and statin    # Peripheral vascular disease:   -Arterial duplex showed left minimal flow within the superficial femoral artery and popliteal artery through the peroneal artery below the knee.  -Vascular follow up outpatient.      # History of CVA   -Residual left side weakness  -Continue ASA and Lipitor.      # DM II  FS goal 110-180, A1C 5.6     # Hypothyroidism: TSH 4.7, FT4 1.3 normal, Continue Levothyroxine.     CKD III: Stable     DVT prophylaxis: lovenox  GI prophylaxis: on Protonix  Code status: Full Code    #Progress Note Handoff:  Discharge plan to home with home care  Family discussion: discussed with patient  Disposition: Home with home care in 24 hrs.

## 2022-12-15 DIAGNOSIS — I13.0 HYPERTENSIVE HEART AND CHRONIC KIDNEY DISEASE WITH HEART FAILURE AND STAGE 1 THROUGH STAGE 4 CHRONIC KIDNEY DISEASE, OR UNSPECIFIED CHRONIC KIDNEY DISEASE: ICD-10-CM
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
## 2022-12-15 DIAGNOSIS — E03.9 HYPOTHYROIDISM, UNSPECIFIED: ICD-10-CM

## 2022-12-15 DIAGNOSIS — T82.7XXA INFECTION AND INFLAMMATORY REACTION DUE TO OTHER CARDIAC AND VASCULAR DEVICES, IMPLANTS AND GRAFTS, INITIAL ENCOUNTER: ICD-10-CM

## 2022-12-15 DIAGNOSIS — Z86.73 PERSONAL HISTORY OF TRANSIENT ISCHEMIC ATTACK (TIA), AND CEREBRAL INFARCTION WITHOUT RESIDUAL DEFICITS: ICD-10-CM

## 2022-12-15 DIAGNOSIS — I27.20 PULMONARY HYPERTENSION, UNSPECIFIED: ICD-10-CM

## 2022-12-15 DIAGNOSIS — Z79.51 LONG TERM (CURRENT) USE OF INHALED STEROIDS: ICD-10-CM

## 2022-12-15 DIAGNOSIS — Z87.891 PERSONAL HISTORY OF NICOTINE DEPENDENCE: ICD-10-CM

## 2022-12-15 DIAGNOSIS — Z79.84 LONG TERM (CURRENT) USE OF ORAL HYPOGLYCEMIC DRUGS: ICD-10-CM

## 2022-12-15 DIAGNOSIS — L29.9 PRURITUS, UNSPECIFIED: ICD-10-CM

## 2022-12-15 DIAGNOSIS — N18.30 CHRONIC KIDNEY DISEASE, STAGE 3 UNSPECIFIED: ICD-10-CM

## 2022-12-15 DIAGNOSIS — I25.10 ATHEROSCLEROTIC HEART DISEASE OF NATIVE CORONARY ARTERY WITHOUT ANGINA PECTORIS: ICD-10-CM

## 2022-12-15 DIAGNOSIS — B95.7 OTHER STAPHYLOCOCCUS AS THE CAUSE OF DISEASES CLASSIFIED ELSEWHERE: ICD-10-CM

## 2022-12-15 DIAGNOSIS — E11.22 TYPE 2 DIABETES MELLITUS WITH DIABETIC CHRONIC KIDNEY DISEASE: ICD-10-CM

## 2022-12-15 DIAGNOSIS — L03.313 CELLULITIS OF CHEST WALL: ICD-10-CM

## 2022-12-15 DIAGNOSIS — R78.81 BACTEREMIA: ICD-10-CM

## 2022-12-15 DIAGNOSIS — I25.5 ISCHEMIC CARDIOMYOPATHY: ICD-10-CM

## 2022-12-15 DIAGNOSIS — I50.23 ACUTE ON CHRONIC SYSTOLIC (CONGESTIVE) HEART FAILURE: ICD-10-CM

## 2022-12-15 DIAGNOSIS — G47.33 OBSTRUCTIVE SLEEP APNEA (ADULT) (PEDIATRIC): ICD-10-CM

## 2022-12-15 DIAGNOSIS — E83.42 HYPOMAGNESEMIA: ICD-10-CM

## 2022-12-15 DIAGNOSIS — Z99.81 DEPENDENCE ON SUPPLEMENTAL OXYGEN: ICD-10-CM

## 2022-12-15 DIAGNOSIS — I31.39 OTHER PERICARDIAL EFFUSION (NONINFLAMMATORY): ICD-10-CM

## 2022-12-15 DIAGNOSIS — J44.9 CHRONIC OBSTRUCTIVE PULMONARY DISEASE, UNSPECIFIED: ICD-10-CM

## 2022-12-15 DIAGNOSIS — F32.A DEPRESSION, UNSPECIFIED: ICD-10-CM

## 2022-12-15 DIAGNOSIS — E11.51 TYPE 2 DIABETES MELLITUS WITH DIABETIC PERIPHERAL ANGIOPATHY WITHOUT GANGRENE: ICD-10-CM

## 2022-12-15 DIAGNOSIS — J96.11 CHRONIC RESPIRATORY FAILURE WITH HYPOXIA: ICD-10-CM

## 2022-12-15 DIAGNOSIS — I33.0 ACUTE AND SUBACUTE INFECTIVE ENDOCARDITIS: ICD-10-CM

## 2022-12-15 DIAGNOSIS — Z79.02 LONG TERM (CURRENT) USE OF ANTITHROMBOTICS/ANTIPLATELETS: ICD-10-CM

## 2022-12-15 DIAGNOSIS — F41.9 ANXIETY DISORDER, UNSPECIFIED: ICD-10-CM

## 2022-12-15 DIAGNOSIS — F17.210 NICOTINE DEPENDENCE, CIGARETTES, UNCOMPLICATED: ICD-10-CM

## 2022-12-23 ENCOUNTER — APPOINTMENT (OUTPATIENT)
Dept: CARE COORDINATION | Facility: HOME HEALTH | Age: 57
End: 2022-12-23
Payer: MEDICARE

## 2022-12-23 DIAGNOSIS — Z86.79 PERSONAL HISTORY OF OTHER DISEASES OF THE CIRCULATORY SYSTEM: ICD-10-CM

## 2022-12-23 DIAGNOSIS — Z86.39 PERSONAL HISTORY OF OTHER ENDOCRINE, NUTRITIONAL AND METABOLIC DISEASE: ICD-10-CM

## 2022-12-23 DIAGNOSIS — Z87.09 PERSONAL HISTORY OF OTHER DISEASES OF THE RESPIRATORY SYSTEM: ICD-10-CM

## 2022-12-23 DIAGNOSIS — G47.33 OBSTRUCTIVE SLEEP APNEA (ADULT) (PEDIATRIC): ICD-10-CM

## 2022-12-23 DIAGNOSIS — Z86.73 PERSONAL HISTORY OF TRANSIENT ISCHEMIC ATTACK (TIA), AND CEREBRAL INFARCTION W/OUT RESIDUAL DEFICITS: ICD-10-CM

## 2022-12-23 DIAGNOSIS — Z99.81 DEPENDENCE ON SUPPLEMENTAL OXYGEN: ICD-10-CM

## 2022-12-23 DIAGNOSIS — Z99.89 OBSTRUCTIVE SLEEP APNEA (ADULT) (PEDIATRIC): ICD-10-CM

## 2022-12-23 DIAGNOSIS — Z92.89 PERSONAL HISTORY OF OTHER MEDICAL TREATMENT: ICD-10-CM

## 2022-12-23 DIAGNOSIS — Z87.448 PERSONAL HISTORY OF OTHER DISEASES OF URINARY SYSTEM: ICD-10-CM

## 2022-12-23 DIAGNOSIS — T82.7XXA INFECTION AND INFLAMMATORY REACTION DUE TO OTHER CARDIAC AND VASCULAR DEVICES, IMPLANTS AND GRAFTS, INITIAL ENCOUNTER: ICD-10-CM

## 2022-12-23 PROCEDURE — 99348 HOME/RES VST EST LOW MDM 30: CPT | Mod: 95

## 2022-12-29 PROBLEM — Z86.79 HISTORY OF CONGESTIVE HEART FAILURE: Status: RESOLVED | Noted: 2022-12-29 | Resolved: 2022-12-29

## 2022-12-29 PROBLEM — Z86.39 HISTORY OF DIABETES MELLITUS: Status: RESOLVED | Noted: 2022-12-29 | Resolved: 2022-12-29

## 2022-12-29 PROBLEM — Z99.81 ON HOME OXYGEN THERAPY: Status: RESOLVED | Noted: 2022-12-29 | Resolved: 2022-12-29

## 2022-12-29 PROBLEM — Z92.89 HISTORY OF RECENT HOSPITALIZATION: Status: RESOLVED | Noted: 2022-12-29 | Resolved: 2022-12-29

## 2022-12-29 PROBLEM — Z86.79 HISTORY OF HYPERTENSION: Status: RESOLVED | Noted: 2022-12-29 | Resolved: 2022-12-29

## 2022-12-29 PROBLEM — G47.33 OSA ON CPAP: Status: RESOLVED | Noted: 2022-12-29 | Resolved: 2022-12-29

## 2022-12-29 PROBLEM — Z86.39 HISTORY OF HYPERLIPIDEMIA: Status: RESOLVED | Noted: 2022-12-29 | Resolved: 2022-12-29

## 2022-12-29 PROBLEM — Z86.79 HISTORY OF PERIPHERAL VASCULAR DISEASE: Status: RESOLVED | Noted: 2022-12-29 | Resolved: 2022-12-29

## 2022-12-29 PROBLEM — Z86.73 HISTORY OF CVA (CEREBROVASCULAR ACCIDENT): Status: RESOLVED | Noted: 2022-12-29 | Resolved: 2022-12-29

## 2022-12-29 PROBLEM — Z86.79 HISTORY OF PULMONARY HYPERTENSION: Status: RESOLVED | Noted: 2022-12-29 | Resolved: 2022-12-29

## 2022-12-29 PROBLEM — Z87.448 HISTORY OF CHRONIC KIDNEY DISEASE: Status: RESOLVED | Noted: 2022-12-29 | Resolved: 2022-12-29

## 2022-12-29 PROBLEM — Z87.09 HISTORY OF CHRONIC OBSTRUCTIVE LUNG DISEASE: Status: RESOLVED | Noted: 2022-12-29 | Resolved: 2022-12-29

## 2022-12-29 PROBLEM — T82.7XXA: Status: ACTIVE | Noted: 2022-09-28

## 2022-12-29 PROBLEM — Z86.79 HISTORY OF CORONARY ARTERY DISEASE: Status: RESOLVED | Noted: 2022-12-29 | Resolved: 2022-12-29

## 2022-12-29 NOTE — REVIEW OF SYSTEMS
[Negative] : Heme/Lymph [FreeTextEntry5] : + chronic  edema [FreeTextEntry6] : Home oxygen [de-identified] : Left subclavian incision

## 2022-12-29 NOTE — ASSESSMENT
[FreeTextEntry1] : "57 year old female with PMH of COPD on 4 L home O2, CORNELIUS on CPAP, HFrEF (19% on TTE 02/2022, s/p ICD, pulmonary HTN, CAD s/p PCI to RCA, HTN, HLD, CVA with residual LLE weakness, DM, active smoker on home O2, presented to the ED for SOB and LE and UE edema. She was admitted for CHF exacerbation, started on IV Lasix then tapered down to her home dose of Torsemide. She had cellulitis at ICD site, BCx positive for Strep and staph s/p Cefepime, ceftriaxone, vancomycin. ID were onboard and recommended Linezolid for a total of 7 days (started on Nov 25, 2022). Following BCx were negative. Patient under went explantation of the ICD on 11/23/2022, surgical swab came back with positive prelim results of Enterococcus fecalis. YOKASTA was deferred because patient is at high risk of getting intubated during the procedure. Post explantation patient was having NSVTs and she was put on a Life vest.During admission she had cold feet, LE arterial duplex showed severe PAD requiring outpatient follow up with vascular. LE venous duplex was negative. Also she had a LUE swelling but US duplex was negative. " The patient was discharged home in stable condition with home care services and follow up care.\par \par Acute on chronic CHF: stable\par Continue diuretic therapy: Torsemide, Spironolactone\par Continue  Entresto, Metoprolol, Farxiga,\par Maintain low salt diet, 1.5 fluid restriction\par Check daily weight and maintain log\par Keep legs elevated\par Reviewed when to call MD with worsening symptoms\par Follow up with PCP and Cardiology\par \par S/P ICD extraction: incision care: stable\par Continue established treatment plan with follow up and local incision care with dressings\par Monitor for worsening  signs and symptoms  and reviewed when to call medical provider\par Pt verbalized good understanding\par Follow up with Medical providers: PCP, Cardiology, EPS\par \par HTN: stable\par Continue established treatment plan with follow up\par Continue BP meds: Entresto, Metoprolol\par Monitor for worsening  signs and symptoms  and reviewed when to call medical provider\par Pt verbalized good understanding\par Follow up with PCP, Cardiology\par \par HLD: stable\par Continue established treatment plan with follow up\par Continue Lipitor, Fenofibrate\par Lipid panel and bloodwork follow up as per PCP\par Pt verbalized good understanding\par Follow up with Medical providers: PCP, Cardiology\par \par COPD, CORNELIUS on CPAP: stable\par Continue established treatment plan with follow up\par Continue current medication therapy: Albuterol, Budesonide, Home Oxygen \par Review importance of turn, cough and deep breathing\par Reviewed with the pt when to call medical provider if symptoms worsen\par Follow up with PCP,  Pulmonary\par \par CAD, S/P PCI: stable\par Continue established treatment plan with follow up\par Continue ASA, Plavix, Metoprolol, Lipitor\par Monitor for worsening  signs and symptoms  and reviewed when to call medical provider\par Pt verbalized good understanding\par Follow up with Medical providers: PCP, Cardiology\par \par DM: stable\par Continue established treatment plan with follow up\par Continue  Metformin\par Monitor for signs and symptoms of hypo/hyperglycemia\par Monitor FS\par Follow up with PCP, Endocrinology\par \par \par \par Pt will need continued Home Care Services RN\par This patient is Enrolled in the [facility] Follow Up Program through [employer]\par Recommended  referral to assist with PCP follow up appointment\par CN reviewed with the pt that pt is under the [facility] program for home care until pt is seen by the PCP.    Reviewed with pt and Home Care RN if symptoms worsen pt will need to call 911 or EMS to be evaluated at local ED.  Pt and Home Care RN verbalized good understanding.\par CN will be assigned to sign Home Care orders post-discharge.\par \par \par \par \par \par \par \par

## 2022-12-29 NOTE — PHYSICAL EXAM
[No Acute Distress] : no acute distress [Well Nourished] : well nourished [Normal Sclera/Conjunctiva] : normal sclera/conjunctiva [Normal Outer Ear/Nose] : the outer ears and nose were normal in appearance [No JVD] : no jugular venous distention [No Respiratory Distress] : no respiratory distress  [No Accessory Muscle Use] : no accessory muscle use [Non Tender] : non-tender [de-identified] : Limited Visual Physical Exam during TeleHealth Visit [de-identified] : Left subclavian incision  [de-identified] : Awake and alert [de-identified] : Calm

## 2022-12-29 NOTE — PATIENT PROFILE ADULT - NSPROHMDIABETBLDGLCTARGET_GEN_A_NUR
----- Message from Clarissa Alves RN sent at 12/29/2022 10:21 AM CST -----    ----- Message -----  From: Reinaldo Anand  Sent: 12/29/2022   9:57 AM CST  To: Cynthia Murphy Staff    Type: Need Medical Advice  Who Called: Katelyn Ramsay callback number:[phone]  Additional Info:  Patient called stating Cynthia told her she need her to take more iron infusions but they have not been scheduled.  Please call to further assist, Thanks        
unknown

## 2022-12-29 NOTE — HISTORY OF PRESENT ILLNESS
[Home] : at home, [unfilled] , at the time of the visit. [Other Location: e.g. Home (Enter Location, City,State)___] : at [unfilled] [Other:____] : [unfilled] [Verbal consent obtained from patient] : the patient, [unfilled] [FreeTextEntry1] : Follow up post discharge s/p recent hospitalization\par  [de-identified] : This patient is Enrolled in the Post-Discharge [facility] Follow Up Program through [facility]\par Copied As per [facility] Discharge Summary\par \par \par "57 year old female with PMH of COPD on 4 L home O2, CORNELIUS on CPAP, HFrEF (19% on TTE 02/2022, s/p ICD, pulmonary HTN, CAD s/p PCI to RCA, HTN, HLD, CVA with residual LLE weakness, DM, active smoker on home O2, presented to the ED for SOB and LE and UE edema. She was admitted for CHF exacerbation, started on IV Lasix then tapered down to her home dose of Torsemide. She had cellulitis at [facility] ICD site, BCx positive for Strep and staph s/p Cefepime, ceftriaxone, vancomycin. ID were onboard and recommended Linezolid for a total of 7 days (started on Nov 25, 2022). Following BCx were negative. Patient under went explantation of the ICD on 11/23/2022, surgical swab came back with positive prelim results of Enterococcus fecalis. YOKASTA was deferred because patient is at high risk of getting intubated during the procedure. Post explantation patient was having NSVTs and she was put on a Life vest.During admission she had cold feet, LE arterial duplex showed severe PAD requiring outpatient follow up with vascular. LE venous duplex was negative. Also she had a LUE swelling but US duplex was negative. " The patient was discharged home in stable condition with home care services and follow up care.\par \par During the TeleHealth the patient was in no acute distress.  Able to speak with complete sentences and no audible wheeze noted.\par The patient denies chest pain, shortness of breath, cough, hemoptysis, fever, palpitations, syncope\par \par \par CN reviewed that pt  is under the [facility] program for home care until pt is seen by  PCP.   CN will be assigned to sign Home Care orders post-discharge.\par \par

## 2023-01-04 ENCOUNTER — APPOINTMENT (OUTPATIENT)
Dept: ELECTROPHYSIOLOGY | Facility: CLINIC | Age: 58
End: 2023-01-04
Payer: MEDICARE

## 2023-01-04 ENCOUNTER — APPOINTMENT (OUTPATIENT)
Dept: CARDIOLOGY | Facility: CLINIC | Age: 58
End: 2023-01-04

## 2023-01-04 VITALS
HEIGHT: 59 IN | WEIGHT: 200 LBS | DIASTOLIC BLOOD PRESSURE: 82 MMHG | BODY MASS INDEX: 40.32 KG/M2 | HEART RATE: 108 BPM | OXYGEN SATURATION: 95 % | SYSTOLIC BLOOD PRESSURE: 140 MMHG

## 2023-01-04 PROCEDURE — 93000 ELECTROCARDIOGRAM COMPLETE: CPT

## 2023-01-04 PROCEDURE — 99215 OFFICE O/P EST HI 40 MIN: CPT

## 2023-01-18 ENCOUNTER — INPATIENT (INPATIENT)
Facility: HOSPITAL | Age: 58
LOS: 16 days | Discharge: HOME HEALTH | End: 2023-02-04
Attending: INTERNAL MEDICINE | Admitting: INTERNAL MEDICINE
Payer: MEDICARE

## 2023-01-18 VITALS
RESPIRATION RATE: 18 BRPM | HEIGHT: 64 IN | OXYGEN SATURATION: 99 % | DIASTOLIC BLOOD PRESSURE: 62 MMHG | HEART RATE: 120 BPM | TEMPERATURE: 98 F | SYSTOLIC BLOOD PRESSURE: 125 MMHG

## 2023-01-18 LAB
ALBUMIN SERPL ELPH-MCNC: 3.6 G/DL — SIGNIFICANT CHANGE UP (ref 3.5–5.2)
ALP SERPL-CCNC: 97 U/L — SIGNIFICANT CHANGE UP (ref 30–115)
ALT FLD-CCNC: 8 U/L — SIGNIFICANT CHANGE UP (ref 0–41)
ANION GAP SERPL CALC-SCNC: 6 MMOL/L — LOW (ref 7–14)
APTT BLD: 39.6 SEC — HIGH (ref 27–39.2)
AST SERPL-CCNC: 59 U/L — HIGH (ref 0–41)
BASE EXCESS BLDV CALC-SCNC: -2.9 MMOL/L — LOW (ref -2–3)
BASOPHILS # BLD AUTO: 0.09 K/UL — SIGNIFICANT CHANGE UP (ref 0–0.2)
BASOPHILS NFR BLD AUTO: 1.9 % — HIGH (ref 0–1)
BILIRUB SERPL-MCNC: 1.2 MG/DL — SIGNIFICANT CHANGE UP (ref 0.2–1.2)
BUN SERPL-MCNC: 11 MG/DL — SIGNIFICANT CHANGE UP (ref 10–20)
CA-I SERPL-SCNC: 1.25 MMOL/L — SIGNIFICANT CHANGE UP (ref 1.15–1.33)
CALCIUM SERPL-MCNC: 8.8 MG/DL — SIGNIFICANT CHANGE UP (ref 8.4–10.5)
CHLORIDE SERPL-SCNC: 104 MMOL/L — SIGNIFICANT CHANGE UP (ref 98–110)
CO2 SERPL-SCNC: 22 MMOL/L — SIGNIFICANT CHANGE UP (ref 17–32)
CREAT SERPL-MCNC: 1.1 MG/DL — SIGNIFICANT CHANGE UP (ref 0.7–1.5)
EGFR: 59 ML/MIN/1.73M2 — LOW
EOSINOPHIL # BLD AUTO: 0.03 K/UL — SIGNIFICANT CHANGE UP (ref 0–0.7)
EOSINOPHIL NFR BLD AUTO: 0.6 % — SIGNIFICANT CHANGE UP (ref 0–8)
FLUAV AG NPH QL: SIGNIFICANT CHANGE UP
FLUBV AG NPH QL: SIGNIFICANT CHANGE UP
GAS PNL BLDV: 135 MMOL/L — LOW (ref 136–145)
GAS PNL BLDV: SIGNIFICANT CHANGE UP
GLUCOSE SERPL-MCNC: 113 MG/DL — HIGH (ref 70–99)
HCO3 BLDV-SCNC: 23 MMOL/L — SIGNIFICANT CHANGE UP (ref 22–29)
HCT VFR BLD CALC: 32.7 % — LOW (ref 37–47)
HCT VFR BLDA CALC: 31 % — LOW (ref 39–51)
HGB BLD CALC-MCNC: 10.2 G/DL — LOW (ref 12.6–17.4)
HGB BLD-MCNC: 10.3 G/DL — LOW (ref 12–16)
IMM GRANULOCYTES NFR BLD AUTO: 0.4 % — HIGH (ref 0.1–0.3)
INR BLD: 1.25 RATIO — SIGNIFICANT CHANGE UP (ref 0.65–1.3)
LACTATE BLDV-MCNC: 0.7 MMOL/L — SIGNIFICANT CHANGE UP (ref 0.5–2)
LACTATE SERPL-SCNC: 0.9 MMOL/L — SIGNIFICANT CHANGE UP (ref 0.7–2)
LYMPHOCYTES # BLD AUTO: 0.8 K/UL — LOW (ref 1.2–3.4)
LYMPHOCYTES # BLD AUTO: 16.6 % — LOW (ref 20.5–51.1)
MCHC RBC-ENTMCNC: 28.3 PG — SIGNIFICANT CHANGE UP (ref 27–31)
MCHC RBC-ENTMCNC: 31.5 G/DL — LOW (ref 32–37)
MCV RBC AUTO: 89.8 FL — SIGNIFICANT CHANGE UP (ref 81–99)
MONOCYTES # BLD AUTO: 0.7 K/UL — HIGH (ref 0.1–0.6)
MONOCYTES NFR BLD AUTO: 14.5 % — HIGH (ref 1.7–9.3)
NEUTROPHILS # BLD AUTO: 3.19 K/UL — SIGNIFICANT CHANGE UP (ref 1.4–6.5)
NEUTROPHILS NFR BLD AUTO: 66 % — SIGNIFICANT CHANGE UP (ref 42.2–75.2)
NRBC # BLD: 0 /100 WBCS — SIGNIFICANT CHANGE UP (ref 0–0)
PCO2 BLDV: 45 MMHG — HIGH (ref 39–42)
PH BLDV: 7.32 — SIGNIFICANT CHANGE UP (ref 7.32–7.43)
PLATELET # BLD AUTO: 290 K/UL — SIGNIFICANT CHANGE UP (ref 130–400)
PO2 BLDV: 72 MMHG — SIGNIFICANT CHANGE UP
POTASSIUM BLDV-SCNC: 4.8 MMOL/L — SIGNIFICANT CHANGE UP (ref 3.5–5.1)
POTASSIUM SERPL-MCNC: SIGNIFICANT CHANGE UP MMOL/L (ref 3.5–5)
POTASSIUM SERPL-SCNC: SIGNIFICANT CHANGE UP MMOL/L (ref 3.5–5)
PROT SERPL-MCNC: 7.7 G/DL — SIGNIFICANT CHANGE UP (ref 6–8)
PROTHROM AB SERPL-ACNC: 14.4 SEC — HIGH (ref 9.95–12.87)
RBC # BLD: 3.64 M/UL — LOW (ref 4.2–5.4)
RBC # FLD: 19.7 % — HIGH (ref 11.5–14.5)
RSV RNA NPH QL NAA+NON-PROBE: SIGNIFICANT CHANGE UP
SAO2 % BLDV: 97 % — SIGNIFICANT CHANGE UP
SARS-COV-2 RNA SPEC QL NAA+PROBE: SIGNIFICANT CHANGE UP
SODIUM SERPL-SCNC: 132 MMOL/L — LOW (ref 135–146)
WBC # BLD: 4.83 K/UL — SIGNIFICANT CHANGE UP (ref 4.8–10.8)
WBC # FLD AUTO: 4.83 K/UL — SIGNIFICANT CHANGE UP (ref 4.8–10.8)

## 2023-01-18 PROCEDURE — 99285 EMERGENCY DEPT VISIT HI MDM: CPT

## 2023-01-18 PROCEDURE — 93970 EXTREMITY STUDY: CPT | Mod: 26

## 2023-01-18 PROCEDURE — 71045 X-RAY EXAM CHEST 1 VIEW: CPT | Mod: 26

## 2023-01-18 PROCEDURE — 93010 ELECTROCARDIOGRAM REPORT: CPT

## 2023-01-18 RX ORDER — METRONIDAZOLE 500 MG
500 TABLET ORAL ONCE
Refills: 0 | Status: COMPLETED | OUTPATIENT
Start: 2023-01-18 | End: 2023-01-18

## 2023-01-18 RX ORDER — MORPHINE SULFATE 50 MG/1
4 CAPSULE, EXTENDED RELEASE ORAL ONCE
Refills: 0 | Status: DISCONTINUED | OUTPATIENT
Start: 2023-01-18 | End: 2023-01-18

## 2023-01-18 RX ORDER — CEFTRIAXONE 500 MG/1
1000 INJECTION, POWDER, FOR SOLUTION INTRAMUSCULAR; INTRAVENOUS ONCE
Refills: 0 | Status: COMPLETED | OUTPATIENT
Start: 2023-01-18 | End: 2023-01-18

## 2023-01-18 RX ORDER — METRONIDAZOLE 500 MG
TABLET ORAL
Refills: 0 | Status: DISCONTINUED | OUTPATIENT
Start: 2023-01-18 | End: 2023-01-19

## 2023-01-18 RX ORDER — METRONIDAZOLE 500 MG
500 TABLET ORAL EVERY 8 HOURS
Refills: 0 | Status: DISCONTINUED | OUTPATIENT
Start: 2023-01-19 | End: 2023-01-19

## 2023-01-18 RX ORDER — SODIUM CHLORIDE 9 MG/ML
500 INJECTION, SOLUTION INTRAVENOUS ONCE
Refills: 0 | Status: COMPLETED | OUTPATIENT
Start: 2023-01-18 | End: 2023-01-18

## 2023-01-18 RX ADMIN — CEFTRIAXONE 1000 MILLIGRAM(S): 500 INJECTION, POWDER, FOR SOLUTION INTRAMUSCULAR; INTRAVENOUS at 22:23

## 2023-01-18 RX ADMIN — SODIUM CHLORIDE 500 MILLILITER(S): 9 INJECTION, SOLUTION INTRAVENOUS at 21:24

## 2023-01-18 RX ADMIN — Medication 100 MILLIGRAM(S): at 21:21

## 2023-01-18 RX ADMIN — MORPHINE SULFATE 4 MILLIGRAM(S): 50 CAPSULE, EXTENDED RELEASE ORAL at 21:24

## 2023-01-18 RX ADMIN — MORPHINE SULFATE 4 MILLIGRAM(S): 50 CAPSULE, EXTENDED RELEASE ORAL at 22:23

## 2023-01-18 RX ADMIN — CEFTRIAXONE 100 MILLIGRAM(S): 500 INJECTION, POWDER, FOR SOLUTION INTRAMUSCULAR; INTRAVENOUS at 21:21

## 2023-01-18 RX ADMIN — SODIUM CHLORIDE 500 MILLILITER(S): 9 INJECTION, SOLUTION INTRAVENOUS at 22:23

## 2023-01-18 RX ADMIN — Medication 500 MILLIGRAM(S): at 22:23

## 2023-01-18 NOTE — ED PROVIDER NOTE - CLINICAL SUMMARY MEDICAL DECISION MAKING FREE TEXT BOX
Patient with bilateral foot pain left greater than right.  Concern for cellulitis mainly in the left leg.  Given ceftriaxone and Flagyl given concern for diabetic foot infection.  No signs of purulence.  Lactate reassuring.  Given IV fluids gently given her heart failure.  Admitted to medicine on telemetry given her significant heart

## 2023-01-18 NOTE — ED PROVIDER NOTE - PHYSICAL EXAMINATION
On exam chronically ill-appearing but overall nontoxic, tachycardic, saturations normal on 4 L nasal cannula, normal work of breathing, lungs with faint crackles in the bases, left upper chest sutures in place after pacemaker removed, no signs of infection.  Heart regular no murmurs.  Abdomen benign, trace right lower extremity edema with erythema but no warmth.  More erythema on the left with dorsal foot soft tissue swelling without crepitus or focal fluctuance.  Warmth on the left leg.  Bilateral dopplerable PT and DP pulses.  Cap refill less than 2 seconds.

## 2023-01-18 NOTE — ED PROVIDER NOTE - DIFFERENTIAL DIAGNOSIS
Cellulitis, clinically not significantly concern for necrotizing fasciitis, versus DVT versus venous stasis, less likely arterial given dopplerable pulses. Differential Diagnosis

## 2023-01-18 NOTE — ED PROVIDER NOTE - OBJECTIVE STATEMENT
57-year-old female with a past medical history of CHF, COPD on 4 L nasal cannula at home, anxiety, hyperlipidemia, hypertension, CVA, diabetes, CKD, sleep apnea, presents with foot pain.  Has been for the last few days more in the left than the right but also associated with swelling.  Reports that she has had some chills but no arlin fever.  Denies any chest pain but does have some shortness of breath.  Denies trauma.

## 2023-01-18 NOTE — ED ADULT NURSE NOTE - INTERVENTIONS DEFINITIONS
Halfway to call system/Call bell, personal items and telephone within reach/Instruct patient to call for assistance/Room bathroom lighting operational/Non-slip footwear when patient is off stretcher/Physically safe environment: no spills, clutter or unnecessary equipment/Stretcher in lowest position, wheels locked, appropriate side rails in place/Provide visual cue, wrist band, yellow gown, etc./Monitor gait and stability

## 2023-01-18 NOTE — ED ADULT NURSE REASSESSMENT NOTE - NS ED NURSE REASSESS COMMENT FT1
Received pt from triage c/o bilateral LE swelling and pain , noted b/l foot redness , pain, warm to touch , pt AO x 4 , HX CHF , on 4 L NC  baseline at home , tachycardic , MD aware ,left upper chest sutures in place after pacemaker removed, no signs of infection.   Abdomen distended ,  Bilateral dopplerable PT and DP pulses.  Cap refill less than 2 seconds., noted old healed abrasion at the abdominal  area and right upper arm , no respiratory distress noted . fall alarm on , prima fit applied , instructed to call nursing staff for assistance

## 2023-01-19 LAB
ALBUMIN SERPL ELPH-MCNC: 3.5 G/DL — SIGNIFICANT CHANGE UP (ref 3.5–5.2)
ALP SERPL-CCNC: 96 U/L — SIGNIFICANT CHANGE UP (ref 30–115)
ALT FLD-CCNC: <5 U/L — SIGNIFICANT CHANGE UP (ref 0–41)
ANION GAP SERPL CALC-SCNC: 13 MMOL/L — SIGNIFICANT CHANGE UP (ref 7–14)
AST SERPL-CCNC: 20 U/L — SIGNIFICANT CHANGE UP (ref 0–41)
BASOPHILS # BLD AUTO: 0.08 K/UL — SIGNIFICANT CHANGE UP (ref 0–0.2)
BASOPHILS NFR BLD AUTO: 1.7 % — HIGH (ref 0–1)
BILIRUB SERPL-MCNC: 1.1 MG/DL — SIGNIFICANT CHANGE UP (ref 0.2–1.2)
BUN SERPL-MCNC: 11 MG/DL — SIGNIFICANT CHANGE UP (ref 10–20)
CALCIUM SERPL-MCNC: 8.8 MG/DL — SIGNIFICANT CHANGE UP (ref 8.4–10.5)
CHLORIDE SERPL-SCNC: 109 MMOL/L — SIGNIFICANT CHANGE UP (ref 98–110)
CHOLEST SERPL-MCNC: 77 MG/DL — SIGNIFICANT CHANGE UP
CO2 SERPL-SCNC: 16 MMOL/L — LOW (ref 17–32)
CREAT SERPL-MCNC: 1.2 MG/DL — SIGNIFICANT CHANGE UP (ref 0.7–1.5)
CRP SERPL-MCNC: 12.3 MG/L — HIGH
D DIMER BLD IA.RAPID-MCNC: 532 NG/ML DDU — HIGH
EGFR: 53 ML/MIN/1.73M2 — LOW
EOSINOPHIL # BLD AUTO: 0 K/UL — SIGNIFICANT CHANGE UP (ref 0–0.7)
EOSINOPHIL NFR BLD AUTO: 0 % — SIGNIFICANT CHANGE UP (ref 0–8)
ERYTHROCYTE [SEDIMENTATION RATE] IN BLOOD: 8 MM/HR — SIGNIFICANT CHANGE UP (ref 0–20)
GLUCOSE BLDC GLUCOMTR-MCNC: 109 MG/DL — HIGH (ref 70–99)
GLUCOSE BLDC GLUCOMTR-MCNC: 76 MG/DL — SIGNIFICANT CHANGE UP (ref 70–99)
GLUCOSE SERPL-MCNC: 95 MG/DL — SIGNIFICANT CHANGE UP (ref 70–99)
HCT VFR BLD CALC: 30.5 % — LOW (ref 37–47)
HDLC SERPL-MCNC: 47 MG/DL — LOW
HGB BLD-MCNC: 9.4 G/DL — LOW (ref 12–16)
LIPID PNL WITH DIRECT LDL SERPL: 22 MG/DL — SIGNIFICANT CHANGE UP
LYMPHOCYTES # BLD AUTO: 0.41 K/UL — LOW (ref 1.2–3.4)
LYMPHOCYTES # BLD AUTO: 8.7 % — LOW (ref 20.5–51.1)
MAGNESIUM SERPL-MCNC: 1.7 MG/DL — LOW (ref 1.8–2.4)
MCHC RBC-ENTMCNC: 28.4 PG — SIGNIFICANT CHANGE UP (ref 27–31)
MCHC RBC-ENTMCNC: 30.8 G/DL — LOW (ref 32–37)
MCV RBC AUTO: 92.1 FL — SIGNIFICANT CHANGE UP (ref 81–99)
MONOCYTES # BLD AUTO: 0.58 K/UL — SIGNIFICANT CHANGE UP (ref 0.1–0.6)
MONOCYTES NFR BLD AUTO: 12.2 % — HIGH (ref 1.7–9.3)
NEUTROPHILS # BLD AUTO: 3.61 K/UL — SIGNIFICANT CHANGE UP (ref 1.4–6.5)
NEUTROPHILS NFR BLD AUTO: 75.7 % — HIGH (ref 42.2–75.2)
NON HDL CHOLESTEROL: 30 MG/DL — SIGNIFICANT CHANGE UP
NRBC # BLD: SIGNIFICANT CHANGE UP /100 WBCS (ref 0–0)
NT-PROBNP SERPL-SCNC: 8880 PG/ML — HIGH (ref 0–300)
PLATELET # BLD AUTO: 176 K/UL — SIGNIFICANT CHANGE UP (ref 130–400)
POTASSIUM SERPL-MCNC: 5.6 MMOL/L — HIGH (ref 3.5–5)
POTASSIUM SERPL-SCNC: 5.6 MMOL/L — HIGH (ref 3.5–5)
PROT SERPL-MCNC: 6.9 G/DL — SIGNIFICANT CHANGE UP (ref 6–8)
RBC # BLD: 3.31 M/UL — LOW (ref 4.2–5.4)
RBC # FLD: 19.3 % — HIGH (ref 11.5–14.5)
SODIUM SERPL-SCNC: 138 MMOL/L — SIGNIFICANT CHANGE UP (ref 135–146)
TRIGL SERPL-MCNC: 37 MG/DL — SIGNIFICANT CHANGE UP
WBC # BLD: 4.77 K/UL — LOW (ref 4.8–10.8)
WBC # FLD AUTO: 4.77 K/UL — LOW (ref 4.8–10.8)

## 2023-01-19 PROCEDURE — 93925 LOWER EXTREMITY STUDY: CPT | Mod: 26

## 2023-01-19 PROCEDURE — 99223 1ST HOSP IP/OBS HIGH 75: CPT | Mod: AI

## 2023-01-19 PROCEDURE — 73630 X-RAY EXAM OF FOOT: CPT | Mod: 26,50

## 2023-01-19 RX ORDER — INSULIN LISPRO 100/ML
VIAL (ML) SUBCUTANEOUS
Refills: 0 | Status: DISCONTINUED | OUTPATIENT
Start: 2023-01-19 | End: 2023-02-03

## 2023-01-19 RX ORDER — CEFAZOLIN SODIUM 1 G
1000 VIAL (EA) INJECTION EVERY 8 HOURS
Refills: 0 | Status: DISCONTINUED | OUTPATIENT
Start: 2023-01-19 | End: 2023-01-20

## 2023-01-19 RX ORDER — HYDROMORPHONE HYDROCHLORIDE 2 MG/ML
0.25 INJECTION INTRAMUSCULAR; INTRAVENOUS; SUBCUTANEOUS ONCE
Refills: 0 | Status: DISCONTINUED | OUTPATIENT
Start: 2023-01-19 | End: 2023-01-19

## 2023-01-19 RX ORDER — CEFAZOLIN SODIUM 1 G
1000 VIAL (EA) INJECTION ONCE
Refills: 0 | Status: COMPLETED | OUTPATIENT
Start: 2023-01-19 | End: 2023-01-19

## 2023-01-19 RX ORDER — SPIRONOLACTONE 25 MG/1
25 TABLET, FILM COATED ORAL DAILY
Refills: 0 | Status: DISCONTINUED | OUTPATIENT
Start: 2023-01-19 | End: 2023-01-20

## 2023-01-19 RX ORDER — CEFAZOLIN SODIUM 1 G
VIAL (EA) INJECTION
Refills: 0 | Status: DISCONTINUED | OUTPATIENT
Start: 2023-01-19 | End: 2023-01-20

## 2023-01-19 RX ORDER — GLUCAGON INJECTION, SOLUTION 0.5 MG/.1ML
1 INJECTION, SOLUTION SUBCUTANEOUS ONCE
Refills: 0 | Status: DISCONTINUED | OUTPATIENT
Start: 2023-01-19 | End: 2023-02-04

## 2023-01-19 RX ORDER — CLOPIDOGREL BISULFATE 75 MG/1
75 TABLET, FILM COATED ORAL DAILY
Refills: 0 | Status: DISCONTINUED | OUTPATIENT
Start: 2023-01-19 | End: 2023-02-04

## 2023-01-19 RX ORDER — GABAPENTIN 400 MG/1
400 CAPSULE ORAL ONCE
Refills: 0 | Status: COMPLETED | OUTPATIENT
Start: 2023-01-19 | End: 2023-01-19

## 2023-01-19 RX ORDER — FUROSEMIDE 40 MG
40 TABLET ORAL DAILY
Refills: 0 | Status: DISCONTINUED | OUTPATIENT
Start: 2023-01-19 | End: 2023-01-20

## 2023-01-19 RX ORDER — LEVOTHYROXINE SODIUM 125 MCG
25 TABLET ORAL DAILY
Refills: 0 | Status: DISCONTINUED | OUTPATIENT
Start: 2023-01-19 | End: 2023-02-04

## 2023-01-19 RX ORDER — BUDESONIDE AND FORMOTEROL FUMARATE DIHYDRATE 160; 4.5 UG/1; UG/1
2 AEROSOL RESPIRATORY (INHALATION)
Refills: 0 | Status: DISCONTINUED | OUTPATIENT
Start: 2023-01-19 | End: 2023-02-04

## 2023-01-19 RX ORDER — DEXTROSE 50 % IN WATER 50 %
15 SYRINGE (ML) INTRAVENOUS ONCE
Refills: 0 | Status: DISCONTINUED | OUTPATIENT
Start: 2023-01-19 | End: 2023-02-04

## 2023-01-19 RX ORDER — OXYCODONE HYDROCHLORIDE 5 MG/1
5 TABLET ORAL EVERY 8 HOURS
Refills: 0 | Status: DISCONTINUED | OUTPATIENT
Start: 2023-01-19 | End: 2023-01-20

## 2023-01-19 RX ORDER — GABAPENTIN 400 MG/1
300 CAPSULE ORAL THREE TIMES A DAY
Refills: 0 | Status: DISCONTINUED | OUTPATIENT
Start: 2023-01-19 | End: 2023-01-24

## 2023-01-19 RX ORDER — SODIUM CHLORIDE 9 MG/ML
1000 INJECTION, SOLUTION INTRAVENOUS
Refills: 0 | Status: DISCONTINUED | OUTPATIENT
Start: 2023-01-19 | End: 2023-02-04

## 2023-01-19 RX ORDER — SACUBITRIL AND VALSARTAN 24; 26 MG/1; MG/1
1 TABLET, FILM COATED ORAL
Refills: 0 | Status: DISCONTINUED | OUTPATIENT
Start: 2023-01-19 | End: 2023-01-19

## 2023-01-19 RX ORDER — MORPHINE SULFATE 50 MG/1
4 CAPSULE, EXTENDED RELEASE ORAL ONCE
Refills: 0 | Status: DISCONTINUED | OUTPATIENT
Start: 2023-01-19 | End: 2023-01-19

## 2023-01-19 RX ORDER — FENOFIBRATE,MICRONIZED 130 MG
145 CAPSULE ORAL DAILY
Refills: 0 | Status: DISCONTINUED | OUTPATIENT
Start: 2023-01-19 | End: 2023-02-04

## 2023-01-19 RX ORDER — DEXTROSE 50 % IN WATER 50 %
12.5 SYRINGE (ML) INTRAVENOUS ONCE
Refills: 0 | Status: DISCONTINUED | OUTPATIENT
Start: 2023-01-19 | End: 2023-02-04

## 2023-01-19 RX ORDER — METOPROLOL TARTRATE 50 MG
100 TABLET ORAL DAILY
Refills: 0 | Status: DISCONTINUED | OUTPATIENT
Start: 2023-01-19 | End: 2023-01-30

## 2023-01-19 RX ORDER — DEXTROSE 50 % IN WATER 50 %
25 SYRINGE (ML) INTRAVENOUS ONCE
Refills: 0 | Status: DISCONTINUED | OUTPATIENT
Start: 2023-01-19 | End: 2023-02-04

## 2023-01-19 RX ORDER — ENOXAPARIN SODIUM 100 MG/ML
40 INJECTION SUBCUTANEOUS EVERY 24 HOURS
Refills: 0 | Status: DISCONTINUED | OUTPATIENT
Start: 2023-01-19 | End: 2023-02-04

## 2023-01-19 RX ORDER — PANTOPRAZOLE SODIUM 20 MG/1
40 TABLET, DELAYED RELEASE ORAL
Refills: 0 | Status: DISCONTINUED | OUTPATIENT
Start: 2023-01-19 | End: 2023-02-04

## 2023-01-19 RX ADMIN — Medication 40 MILLIGRAM(S): at 06:51

## 2023-01-19 RX ADMIN — BUDESONIDE AND FORMOTEROL FUMARATE DIHYDRATE 2 PUFF(S): 160; 4.5 AEROSOL RESPIRATORY (INHALATION) at 09:13

## 2023-01-19 RX ADMIN — GABAPENTIN 400 MILLIGRAM(S): 400 CAPSULE ORAL at 09:48

## 2023-01-19 RX ADMIN — Medication 100 MILLIGRAM(S): at 06:57

## 2023-01-19 RX ADMIN — GABAPENTIN 300 MILLIGRAM(S): 400 CAPSULE ORAL at 22:24

## 2023-01-19 RX ADMIN — HYDROMORPHONE HYDROCHLORIDE 0.25 MILLIGRAM(S): 2 INJECTION INTRAMUSCULAR; INTRAVENOUS; SUBCUTANEOUS at 05:33

## 2023-01-19 RX ADMIN — SPIRONOLACTONE 25 MILLIGRAM(S): 25 TABLET, FILM COATED ORAL at 05:47

## 2023-01-19 RX ADMIN — Medication 25 MICROGRAM(S): at 05:32

## 2023-01-19 RX ADMIN — CLOPIDOGREL BISULFATE 75 MILLIGRAM(S): 75 TABLET, FILM COATED ORAL at 11:36

## 2023-01-19 RX ADMIN — BUDESONIDE AND FORMOTEROL FUMARATE DIHYDRATE 2 PUFF(S): 160; 4.5 AEROSOL RESPIRATORY (INHALATION) at 22:25

## 2023-01-19 RX ADMIN — MORPHINE SULFATE 4 MILLIGRAM(S): 50 CAPSULE, EXTENDED RELEASE ORAL at 01:36

## 2023-01-19 RX ADMIN — Medication 100 MILLIGRAM(S): at 05:34

## 2023-01-19 RX ADMIN — Medication 145 MILLIGRAM(S): at 11:36

## 2023-01-19 RX ADMIN — Medication 100 MILLIGRAM(S): at 22:24

## 2023-01-19 RX ADMIN — OXYCODONE HYDROCHLORIDE 5 MILLIGRAM(S): 5 TABLET ORAL at 09:13

## 2023-01-19 RX ADMIN — Medication 100 MILLIGRAM(S): at 15:18

## 2023-01-19 RX ADMIN — ENOXAPARIN SODIUM 40 MILLIGRAM(S): 100 INJECTION SUBCUTANEOUS at 05:32

## 2023-01-19 NOTE — H&P ADULT - NSHPLABSRESULTS_GEN_ALL_CORE
10.3   4.83  )-----------( 290      ( 18 Jan 2023 21:15 )             32.7   01-18    132<L>  |  104  |  11  ----------------------------<  113<H>  TNP   |  22  |  1.1    Ca    8.8      18 Jan 2023 21:15    TPro  7.7  /  Alb  3.6  /  TBili  1.2  /  DBili  x   /  AST  59<H>  /  ALT  8   /  AlkPhos  97  01-18

## 2023-01-19 NOTE — H&P ADULT - HISTORY OF PRESENT ILLNESS
57 year old female with PMH of COPD on 4 L home O2, CORNELIUS on CPAP, HFrEF (19% on TTE 02/2022, s/p ICD, pulmonary HTN, CAD s/p PCI to RCA)1, HTN, HLD, CVA with residual LLE weakness, DM, active smoker on home O2, presented to the ED for foot pain.       ED Course:   Vitals: T 98, , /62, RR 18, 99% on 4L NC  Labs: unremarkable   Imaging: none performed    Patient admitted for LE Cellulitis.        57 year old female with PMH of COPD on 4 L home O2, CORNELIUS on CPAP, HFrEF (19% on TTE 02/2022, s/p ICD, pulmonary HTN, CAD s/p PCI to RCA)1, HTN, HLD, CVA with residual LLE weakness, DM, active smoker on home O2, presented to the ED for foot pain.     She reports that pain started in bilateral legs two days ago. She also noticed increased swelling in both legs. Pain was so intense, she could no longer ambulate or touch her feet. Symptoms are worse on her L leg. She also reports that her chronic shortness of breath has been worse than usual. She also endorses that she generally feels more swollen than usual.       ED Course:   Vitals: T 98, , /62, RR 18, 99% on 4L NC  Labs: unremarkable   Imaging: none performed    Patient admitted for bilateral LE edema, CHF Exacerbation vs. Cellulitis (less likely)     Patient does not know any of her meds or doses; verify with [facility] in the AM. Meds started using surescripts.

## 2023-01-19 NOTE — H&P ADULT - NSHPREVIEWOFSYSTEMS_GEN_ALL_CORE
Denies chest pain, or palpitations  Admits to SOB  Denies headaches  Denies fevers  Admits to leg pain

## 2023-01-19 NOTE — H&P ADULT - ASSESSMENT
57 year old female with PMH of COPD on 4 L home O2, CORNELIUS on CPAP, HFrEF (19% on TTE 02/2022, s/p ICD, pulmonary HTN, CAD s/p PCI to RCA)1, HTN, HLD, CVA with residual LLE weakness, DM, active smoker on home O2, presented to the ED for foot pain.     She reports that pain started in bilateral legs two days ago. She also noticed increased swelling in both legs. Pain was so intense, she could no longer ambulate or touch her feet. Symptoms are worse on her L leg. She also reports that her chronic shortness of breath has been worse than usual. She also endorses that she generally feels more swollen than usual.       ED Course:   Vitals: T 98, , /62, RR 18, 99% on 4L NC  Labs: unremarkable   Imaging: none performed    Patient admitted for bilateral LE edema, CHF Exacerbation vs. Cellulitis (less likely)     Patient does not know any of her meds or doses; verify with [employer] in the AM. Meds started using surescripts.          57 year old female with PMH of COPD on 4 L home O2, CORNELIUS on CPAP, HFrEF (19% on TTE 02/2022, s/p ICD, pulmonary HTN, CAD s/p PCI to RCA)1, HTN, HLD, CVA with residual LLE weakness, DM, active smoker on home O2, presented to the ED for foot pain.   Patient does not know any of her meds or doses; verify with [employer] in the AM. Meds started using surescripts.       # bilateral LE edema, CHF Exacerbation vs. Cellulitis (less likely)   # Chronic Hypoxic Respiratory Failure- slightly worse   # HFrEF s/p ICD   - No out of proportion tenderness, open wound, or purulence on exam   - subjective dyspnea on home 4L NC   - s/p rocephin and flagyl in ED, but changes consistent with venous stasis, not cellulits; no leukocytosis   - Obtain BNP   - Obtain CXR   - bilateral LE duplex   - c/w home torsemide 20mg qd   - c/w entresto   - f/u cultures and procal     # COPD - on home 4L O2  # CORNELIUS - on CPAP at home   - c/w symbicort     # CAD s/p PCI   # HTN   - c/w plavix   - c/w entresto   - c/w metoprolol 100mg ER     # DM   - hold home farxiga and metformin   - fingersticks and insulin PRN     # Chronic normocytic anemia   - Hb 10.3; can f/u Fe studies      # DLD   - verify if patient on statin ([facility] pharmacy)     # Hx of CVA  - c/w plavix   - verify if patient on statin ([facility] pharmacy)     DVT ppx: Lovenox   GI ppx: protonix   Diet: dash   Dispo: acute

## 2023-01-19 NOTE — H&P ADULT - ATTENDING COMMENTS
Patient seen and examined at bedside independently of the residents. I read the resident's note and agree with the plan with the additions and corrections as noted below.    REVIEW OF SYSTEMS:  CONSTITUTIONAL: No weakness, fevers or chills  EYES/ENT: No visual changes;  No vertigo or throat pain   NECK: No pain or stiffness  RESPIRATORY: No cough, wheezing, hemoptysis; + shortness of breath  CARDIOVASCULAR: No chest pain or palpitations  GASTROINTESTINAL: No abdominal or epigastric pain. No nausea, vomiting, or hematemesis; No diarrhea or constipation. No melena or hematochezia.  GENITOURINARY: No dysuria, frequency or hematuria  NEUROLOGICAL: No numbness or weakness  MSK: Left foot pain and swelling.   SKIN: No itching, rashes.     PMH: COPD on 4 L home O2, CORNELIUS on CPAP, HFrEF (25-30% on TTE 2022, s/p AICD, pulmonary HTN, CAD s/p PCI to RCA)1, HTN, HLD, CVA with residual LLE weakness, DM, active smoker     FHx: Reviewed. No fhx of asthma/copd, No fhx of liver and pulmonary disease. No fhx of hematological disorder.     Physical Exam:  GEN: No acute distress. Awake, Alert and oriented x 3.   Head: Atraumatic, Normocephalic.   Eye: PEERLA. No sclera icterus. EOMI.   ENT: Normal oropharynx, no thyromegaly, no mass, no lymphadenopathy.   LUNGS: Clear to auscultation bilaterally. No wheeze/rales/crackles.   HEART: Normal. S1/S2 present. RRR. No murmur/gallops.   ABD: Soft, non-tender, non-distended. Bowel sounds present.   EXT: 2+ pitting edema b/l LE. LLE has mild erythema and severe tenderness to palpation of left lateral foot area.   Integumentary: No rash, No sore, No petechia.   NEURO: CN III-XII intact. Strength: 5/5 b/l ULE. Sensory intact b/l ULE.     Vital Signs Last 24 Hrs  T(C): 36.2 (2023 00:25), Max: 36.7 (2023 18:02)  T(F): 97.2 (2023 00:25), Max: 98 (2023 18:02)  HR: 104 (2023 05:42) (104 - 120)  BP: 109/63 (2023 05:42) (105/59 - 125/62)  BP(mean): 73 (2023 23:00) (73 - 73)  RR: 20 (2023 05:42) (18 - 20)  SpO2: 95% (2023 05:42) (95% - 99%)    Parameters below as of 2023 05:42  Patient On (Oxygen Delivery Method): nasal cannula  O2 Flow (L/min): 4L      Please see the above notes for Labs and radiology.     Assessment and Plan:     58 yo F with hx of COPD on 4 L home O2, CORNELIUS on CPAP, HFrEF (25-30% on TTE 2022, s/p AICD, pulmonary HTN, CAD s/p PCI to RCA)1, HTN, HLD, CVA with residual LLE weakness, DM, active smoker on home O2, presented to the ED for Left foot pain.     Acute Left foot pain  - On physical exam, patient has mild erythema of left foot and tenderness to palpation of left lateral foot pain.   - will get venous duplex and arterial duplex  - X-ray of foot   - will start on Ancef for possible cellulitis component.     Shortness of breath likely 2/2 mild acute HFrEF exacerbation   - patient reports worsening SOB a/w worsening b/l LE swelling.   - CXR also shows pulmonary vascular congestion.   - patient is on PO torsemide 20mg qd at home. Will switch to IV lasix 40mg qd for now.   - patient had TTE done in Dec 2022 which shows LVEF 25-30%.   - monitor daily weight, strict I & O, Low Na diet with fluid restriction.     COPD - not on exacerbation currently. On 4L NC which is at baseline. c/w home inhalers.   CAD s/p PCI - c/w home med  HTN/HLD - c/w home med    DVT ppx: Lovenox SC  GI ppx:  PPI  Diet: DASH with fluid restriction.   Activity: as tolerated.     Date seen by the attendin2023  Total time spent: 75 minutes.

## 2023-01-19 NOTE — H&P ADULT - NSHPPHYSICALEXAM_GEN_ALL_CORE
Constitutional: pt is comfortable in bed; no acute distress; well-groomed  Respiratory: (+) sounds in all lung fields; prolonged expiratory phase but no wheezing   Cardiovascular: S1 S2 regular rate and rhythm;   Gastrointestinal: abdomen is non-distended, non-tender to superficial and deep palpation  Extremities: bilateral LE have 1+ pitting edema  Neurological: AxO x3 to self, place and year

## 2023-01-19 NOTE — CHART NOTE - NSCHARTNOTEFT_GEN_A_CORE
Pt seen and examined. Pt evaluated by overnight nocturnist. Admitted for bilateral LE pain, possibly due to DM neuropathy. WIll initiate gabapentin and reassess. Cont IV abx to cover for cellulitis. Pt seen and examined. Pt evaluated by overnight nocturnist. Admitted for bilateral LE pain, possibly due to DM neuropathy. WIll initiate gabapentin and reassess. Cont IV abx to cover for cellulitis. Soft BP noted today morning, will hold entresto for now.

## 2023-01-20 LAB
-  COAGULASE NEGATIVE STAPHYLOCOCCUS: SIGNIFICANT CHANGE UP
A1C WITH ESTIMATED AVERAGE GLUCOSE RESULT: 5.3 % — SIGNIFICANT CHANGE UP (ref 4–5.6)
ALBUMIN SERPL ELPH-MCNC: 3.3 G/DL — LOW (ref 3.5–5.2)
ALP SERPL-CCNC: 75 U/L — SIGNIFICANT CHANGE UP (ref 30–115)
ALT FLD-CCNC: <5 U/L — SIGNIFICANT CHANGE UP (ref 0–41)
ANION GAP SERPL CALC-SCNC: 8 MMOL/L — SIGNIFICANT CHANGE UP (ref 7–14)
ANION GAP SERPL CALC-SCNC: 8 MMOL/L — SIGNIFICANT CHANGE UP (ref 7–14)
AST SERPL-CCNC: 16 U/L — SIGNIFICANT CHANGE UP (ref 0–41)
BASOPHILS # BLD AUTO: 0.09 K/UL — SIGNIFICANT CHANGE UP (ref 0–0.2)
BASOPHILS NFR BLD AUTO: 1.9 % — HIGH (ref 0–1)
BILIRUB SERPL-MCNC: 1.5 MG/DL — HIGH (ref 0.2–1.2)
BUN SERPL-MCNC: 14 MG/DL — SIGNIFICANT CHANGE UP (ref 10–20)
BUN SERPL-MCNC: 16 MG/DL — SIGNIFICANT CHANGE UP (ref 10–20)
CALCIUM SERPL-MCNC: 8.6 MG/DL — SIGNIFICANT CHANGE UP (ref 8.4–10.4)
CALCIUM SERPL-MCNC: 8.9 MG/DL — SIGNIFICANT CHANGE UP (ref 8.4–10.4)
CHLORIDE SERPL-SCNC: 101 MMOL/L — SIGNIFICANT CHANGE UP (ref 98–110)
CHLORIDE SERPL-SCNC: 105 MMOL/L — SIGNIFICANT CHANGE UP (ref 98–110)
CO2 SERPL-SCNC: 21 MMOL/L — SIGNIFICANT CHANGE UP (ref 17–32)
CO2 SERPL-SCNC: 22 MMOL/L — SIGNIFICANT CHANGE UP (ref 17–32)
CREAT SERPL-MCNC: 1.3 MG/DL — SIGNIFICANT CHANGE UP (ref 0.7–1.5)
CREAT SERPL-MCNC: 1.4 MG/DL — SIGNIFICANT CHANGE UP (ref 0.7–1.5)
CULTURE RESULTS: SIGNIFICANT CHANGE UP
EGFR: 44 ML/MIN/1.73M2 — LOW
EGFR: 49 ML/MIN/1.73M2 — LOW
EOSINOPHIL # BLD AUTO: 0.02 K/UL — SIGNIFICANT CHANGE UP (ref 0–0.7)
EOSINOPHIL NFR BLD AUTO: 0.4 % — SIGNIFICANT CHANGE UP (ref 0–8)
ESTIMATED AVERAGE GLUCOSE: 105 MG/DL — SIGNIFICANT CHANGE UP (ref 68–114)
GLUCOSE BLDC GLUCOMTR-MCNC: 104 MG/DL — HIGH (ref 70–99)
GLUCOSE BLDC GLUCOMTR-MCNC: 152 MG/DL — HIGH (ref 70–99)
GLUCOSE BLDC GLUCOMTR-MCNC: 82 MG/DL — SIGNIFICANT CHANGE UP (ref 70–99)
GLUCOSE BLDC GLUCOMTR-MCNC: 95 MG/DL — SIGNIFICANT CHANGE UP (ref 70–99)
GLUCOSE SERPL-MCNC: 66 MG/DL — LOW (ref 70–99)
GLUCOSE SERPL-MCNC: 94 MG/DL — SIGNIFICANT CHANGE UP (ref 70–99)
GRAM STN FLD: SIGNIFICANT CHANGE UP
HCT VFR BLD CALC: 32.9 % — LOW (ref 37–47)
HGB BLD-MCNC: 10 G/DL — LOW (ref 12–16)
IMM GRANULOCYTES NFR BLD AUTO: 0.4 % — HIGH (ref 0.1–0.3)
LYMPHOCYTES # BLD AUTO: 0.86 K/UL — LOW (ref 1.2–3.4)
LYMPHOCYTES # BLD AUTO: 18.4 % — LOW (ref 20.5–51.1)
MAGNESIUM SERPL-MCNC: 1.6 MG/DL — LOW (ref 1.8–2.4)
MCHC RBC-ENTMCNC: 28 PG — SIGNIFICANT CHANGE UP (ref 27–31)
MCHC RBC-ENTMCNC: 30.4 G/DL — LOW (ref 32–37)
MCV RBC AUTO: 92.2 FL — SIGNIFICANT CHANGE UP (ref 81–99)
METHOD TYPE: SIGNIFICANT CHANGE UP
MONOCYTES # BLD AUTO: 0.73 K/UL — HIGH (ref 0.1–0.6)
MONOCYTES NFR BLD AUTO: 15.6 % — HIGH (ref 1.7–9.3)
NEUTROPHILS # BLD AUTO: 2.95 K/UL — SIGNIFICANT CHANGE UP (ref 1.4–6.5)
NEUTROPHILS NFR BLD AUTO: 63.3 % — SIGNIFICANT CHANGE UP (ref 42.2–75.2)
NRBC # BLD: 0 /100 WBCS — SIGNIFICANT CHANGE UP (ref 0–0)
ORGANISM # SPEC MICROSCOPIC CNT: SIGNIFICANT CHANGE UP
ORGANISM # SPEC MICROSCOPIC CNT: SIGNIFICANT CHANGE UP
PLATELET # BLD AUTO: 190 K/UL — SIGNIFICANT CHANGE UP (ref 130–400)
POTASSIUM SERPL-MCNC: 5.4 MMOL/L — HIGH (ref 3.5–5)
POTASSIUM SERPL-MCNC: 5.6 MMOL/L — HIGH (ref 3.5–5)
POTASSIUM SERPL-SCNC: 5.4 MMOL/L — HIGH (ref 3.5–5)
POTASSIUM SERPL-SCNC: 5.6 MMOL/L — HIGH (ref 3.5–5)
PROT SERPL-MCNC: 6.8 G/DL — SIGNIFICANT CHANGE UP (ref 6–8)
RBC # BLD: 3.57 M/UL — LOW (ref 4.2–5.4)
RBC # FLD: 19.5 % — HIGH (ref 11.5–14.5)
SODIUM SERPL-SCNC: 131 MMOL/L — LOW (ref 135–146)
SODIUM SERPL-SCNC: 134 MMOL/L — LOW (ref 135–146)
SPECIMEN SOURCE: SIGNIFICANT CHANGE UP
SPECIMEN SOURCE: SIGNIFICANT CHANGE UP
WBC # BLD: 4.67 K/UL — LOW (ref 4.8–10.8)
WBC # FLD AUTO: 4.67 K/UL — LOW (ref 4.8–10.8)

## 2023-01-20 PROCEDURE — 99233 SBSQ HOSP IP/OBS HIGH 50: CPT

## 2023-01-20 RX ORDER — FUROSEMIDE 40 MG
40 TABLET ORAL ONCE
Refills: 0 | Status: DISCONTINUED | OUTPATIENT
Start: 2023-01-20 | End: 2023-01-20

## 2023-01-20 RX ORDER — MAGNESIUM SULFATE 500 MG/ML
2 VIAL (ML) INJECTION ONCE
Refills: 0 | Status: COMPLETED | OUTPATIENT
Start: 2023-01-20 | End: 2023-01-20

## 2023-01-20 RX ORDER — DIVALPROEX SODIUM 500 MG/1
1 TABLET, DELAYED RELEASE ORAL
Qty: 0 | Refills: 0 | DISCHARGE

## 2023-01-20 RX ORDER — FUROSEMIDE 40 MG
40 TABLET ORAL EVERY 12 HOURS
Refills: 0 | Status: DISCONTINUED | OUTPATIENT
Start: 2023-01-20 | End: 2023-01-20

## 2023-01-20 RX ORDER — OXYCODONE HYDROCHLORIDE 5 MG/1
5 TABLET ORAL
Refills: 0 | Status: DISCONTINUED | OUTPATIENT
Start: 2023-01-20 | End: 2023-01-22

## 2023-01-20 RX ORDER — SODIUM ZIRCONIUM CYCLOSILICATE 10 G/10G
10 POWDER, FOR SUSPENSION ORAL ONCE
Refills: 0 | Status: COMPLETED | OUTPATIENT
Start: 2023-01-20 | End: 2023-01-20

## 2023-01-20 RX ORDER — FUROSEMIDE 40 MG
40 TABLET ORAL ONCE
Refills: 0 | Status: COMPLETED | OUTPATIENT
Start: 2023-01-20 | End: 2023-01-20

## 2023-01-20 RX ORDER — FUROSEMIDE 40 MG
40 TABLET ORAL EVERY 12 HOURS
Refills: 0 | Status: DISCONTINUED | OUTPATIENT
Start: 2023-01-21 | End: 2023-01-21

## 2023-01-20 RX ADMIN — Medication 25 GRAM(S): at 11:56

## 2023-01-20 RX ADMIN — Medication 100 MILLIGRAM(S): at 06:48

## 2023-01-20 RX ADMIN — Medication 145 MILLIGRAM(S): at 11:56

## 2023-01-20 RX ADMIN — GABAPENTIN 300 MILLIGRAM(S): 400 CAPSULE ORAL at 06:48

## 2023-01-20 RX ADMIN — ENOXAPARIN SODIUM 40 MILLIGRAM(S): 100 INJECTION SUBCUTANEOUS at 06:47

## 2023-01-20 RX ADMIN — Medication 25 MICROGRAM(S): at 06:48

## 2023-01-20 RX ADMIN — GABAPENTIN 300 MILLIGRAM(S): 400 CAPSULE ORAL at 14:19

## 2023-01-20 RX ADMIN — OXYCODONE HYDROCHLORIDE 5 MILLIGRAM(S): 5 TABLET ORAL at 18:19

## 2023-01-20 RX ADMIN — BUDESONIDE AND FORMOTEROL FUMARATE DIHYDRATE 2 PUFF(S): 160; 4.5 AEROSOL RESPIRATORY (INHALATION) at 20:00

## 2023-01-20 RX ADMIN — PANTOPRAZOLE SODIUM 40 MILLIGRAM(S): 20 TABLET, DELAYED RELEASE ORAL at 06:48

## 2023-01-20 RX ADMIN — Medication 40 MILLIGRAM(S): at 18:19

## 2023-01-20 RX ADMIN — SODIUM ZIRCONIUM CYCLOSILICATE 10 GRAM(S): 10 POWDER, FOR SUSPENSION ORAL at 11:56

## 2023-01-20 RX ADMIN — CLOPIDOGREL BISULFATE 75 MILLIGRAM(S): 75 TABLET, FILM COATED ORAL at 11:56

## 2023-01-20 RX ADMIN — SODIUM ZIRCONIUM CYCLOSILICATE 10 GRAM(S): 10 POWDER, FOR SUSPENSION ORAL at 22:22

## 2023-01-20 RX ADMIN — BUDESONIDE AND FORMOTEROL FUMARATE DIHYDRATE 2 PUFF(S): 160; 4.5 AEROSOL RESPIRATORY (INHALATION) at 09:57

## 2023-01-20 RX ADMIN — GABAPENTIN 300 MILLIGRAM(S): 400 CAPSULE ORAL at 22:21

## 2023-01-20 RX ADMIN — OXYCODONE HYDROCHLORIDE 5 MILLIGRAM(S): 5 TABLET ORAL at 06:53

## 2023-01-20 NOTE — PROGRESS NOTE ADULT - SUBJECTIVE AND OBJECTIVE BOX
WILLIAN MARY  57y  [facility]-N T5-3C 012 B      Patient is a 57y old  Female who presents with a chief complaint of     INTERVAL HPI/OVERNIGHT EVENTS:  Patient complaining of severe leg pain due to swelling  she is on 4 liter oxygen which is her baseline   no other complains noted             FAMILY HISTORY:  FH: diabetes mellitus (Father, Mother)      T(C): 37.3 (01-20-23 @ 04:48), Max: 37.3 (01-20-23 @ 04:48)  HR: 78 (01-20-23 @ 05:46) (78 - 99)  BP: 99/57 (01-20-23 @ 05:46) (92/50 - 99/65)  RR: 18 (01-20-23 @ 05:46) (18 - 20)  SpO2: 96% (01-20-23 @ 05:46) (96% - 98%)  Wt(kg): --Vital Signs Last 24 Hrs  T(C): 37.3 (20 Jan 2023 04:48), Max: 37.3 (20 Jan 2023 04:48)  T(F): 99.1 (20 Jan 2023 04:48), Max: 99.1 (20 Jan 2023 04:48)  HR: 78 (20 Jan 2023 05:46) (78 - 99)  BP: 99/57 (20 Jan 2023 05:46) (92/50 - 99/65)  BP(mean): 72 (20 Jan 2023 00:15) (72 - 72)  RR: 18 (20 Jan 2023 05:46) (18 - 20)  SpO2: 96% (20 Jan 2023 05:46) (96% - 98%)    Parameters below as of 20 Jan 2023 05:46  Patient On (Oxygen Delivery Method): nasal cannula  O2 Flow (L/min): 4      PHYSICAL EXAM:  GENERAL: NAD, well-groomed, well-developed  NERVOUS SYSTEM:  Alert & Oriented X3, Good concentration;  PULM: Clear to auscultation bilaterally  CARDIAC: Regular rate and rhythm;  GI: Soft, Nontender, Nondistended; Bowel sounds present  EXTREMITIES: significant lower leg edema noted     Consultant(s) Notes Reviewed:  [x ] YES  [ ] NO  Care Discussed with Consultants/Other Providers [ x] YES  [ ] NO    LABS:                            10.0   4.67  )-----------( 190      ( 20 Jan 2023 08:46 )             32.9   01-20    134<L>  |  105  |  14  ----------------------------<  66<L>  5.6<H>   |  21  |  1.4    Ca    8.9      20 Jan 2023 08:46  Mg     1.6     01-20    TPro  6.8  /  Alb  3.3<L>  /  TBili  1.5<H>  /  DBili  x   /  AST  16  /  ALT  <5  /  AlkPhos  75  01-20            Culture - Blood (collected 18 Jan 2023 21:11)  Source: .Blood Blood-Peripheral  Preliminary Report (20 Jan 2023 07:01):    No growth to date.    Culture - Blood (collected 18 Jan 2023 21:11)  Source: .Blood Blood-Peripheral  Gram Stain (20 Jan 2023 06:08):    Growth in aerobic bottle: Gram positive cocci in pairs  Preliminary Report (20 Jan 2023 06:09):    Growth in aerobic bottle: Gram positive cocci in pairs    ***Blood Panel PCR results on this specimen are available    approximately 3 hours after the Gram stain result.***    Gram stain, PCR, and/or culture results may not always    correspond due to difference in methodologies.    ************************************************************    This PCR assay was performed by multiplex PCR. This    Assay tests for 66 bacterial and resistance gene targets.    Please refer to the [facility] test directory    at [email or web address] for details.  Organism: Blood Culture PCR (20 Jan 2023 07:09)  Organism: Blood Culture PCR (20 Jan 2023 07:09)      budesonide 160 MICROgram(s)/formoterol 4.5 MICROgram(s) Inhaler 2 Puff(s) Inhalation two times a day  clopidogrel Tablet 75 milliGRAM(s) Oral daily  dextrose 5%. 1000 milliLiter(s) IV Continuous <Continuous>  dextrose 5%. 1000 milliLiter(s) IV Continuous <Continuous>  dextrose 50% Injectable 25 Gram(s) IV Push once  dextrose 50% Injectable 12.5 Gram(s) IV Push once  dextrose 50% Injectable 25 Gram(s) IV Push once  dextrose Oral Gel 15 Gram(s) Oral once PRN  enoxaparin Injectable 40 milliGRAM(s) SubCutaneous every 24 hours  fenofibrate Tablet 145 milliGRAM(s) Oral daily  furosemide   Injectable 40 milliGRAM(s) IV Push once  gabapentin 300 milliGRAM(s) Oral three times a day  glucagon  Injectable 1 milliGRAM(s) IntraMuscular once  insulin lispro (ADMELOG) corrective regimen sliding scale   SubCutaneous three times a day before meals  levothyroxine 25 MICROGram(s) Oral daily  metolazone 2.5 milliGRAM(s) Oral once  metoprolol succinate  milliGRAM(s) Oral daily  oxyCODONE    IR 5 milliGRAM(s) Oral every 8 hours PRN  pantoprazole    Tablet 40 milliGRAM(s) Oral before breakfast    57 year old female with PMH of COPD on 4 L home O2, CORNELIUS on CPAP, HFrEF (19% on TTE 02/2022, s/p ICD, pulmonary HTN, CAD s/p PCI to RCA)1, HTN, HLD, CVA with residual LLE weakness, DM, active smoker on home O2, presented to the ED for foot pain.   Patient does not know any of her meds or doses; verify with [employer] in the AM. Meds started using surescripts.       1. SOB and severe lower extremity edema due to acute systolic CHF   - Admit to medicine         - CXR:CHF. BNP elevated   - COnt lasix 40mg IV bid. Give metolazone 2.5 mg po *1   * pt was on torsemide 20mg po daily   - Stop spironolactone for now due to K>5.5   - Stop abs no signs of cellulitis  - Cont entresto   - Cont metoprolol   * Last echo showed EF:19%   - EP consult:pending     2. hx of chronic hypoxic resp failure due to COPD   - Cont inhalers  - not  in acute exacerbation     3. CORNELIUS  - COnt CPAP       4. CAD s/p PCI / HTN   - c/w plavix   - c/w entresto   - c/w metoprolol 100mg ER     5. DM-II complicated  - hold home farxiga and metformin   - fingersticks and insulin PRN     6. Chronic normocytic anemia   - Hb 10.3; can f/u Fe studies     7. D LD   - verify if patient on statin ([facility] pharmacy)     8. Hx of CVA  - c/w plavix   - verify if patient on statin ([facility] pharmacy)     DVT ppx: Lovenox   GI ppx: protonix   Diet: dash   Dispo: acute                 Case Discussed with House Staff   39  minutes spent on total encounter; more than 50% of the visit was spent counseling and/or coordinating care by the attending physician.   Spectra [phone]     WILLIAN MARY  57y  [facility] T5-3C 012 B      Patient is a 57y old  Female who presents with a chief complaint of     INTERVAL HPI/OVERNIGHT EVENTS:  Patient complaining of severe leg pain due to swelling  she is on 4 liter oxygen which is her baseline   no other complains noted             FAMILY HISTORY:  FH: diabetes mellitus (Father, Mother)      T(C): 37.3 (01-20-23 @ 04:48), Max: 37.3 (01-20-23 @ 04:48)  HR: 78 (01-20-23 @ 05:46) (78 - 99)  BP: 99/57 (01-20-23 @ 05:46) (92/50 - 99/65)  RR: 18 (01-20-23 @ 05:46) (18 - 20)  SpO2: 96% (01-20-23 @ 05:46) (96% - 98%)  Wt(kg): --Vital Signs Last 24 Hrs  T(C): 37.3 (20 Jan 2023 04:48), Max: 37.3 (20 Jan 2023 04:48)  T(F): 99.1 (20 Jan 2023 04:48), Max: 99.1 (20 Jan 2023 04:48)  HR: 78 (20 Jan 2023 05:46) (78 - 99)  BP: 99/57 (20 Jan 2023 05:46) (92/50 - 99/65)  BP(mean): 72 (20 Jan 2023 00:15) (72 - 72)  RR: 18 (20 Jan 2023 05:46) (18 - 20)  SpO2: 96% (20 Jan 2023 05:46) (96% - 98%)    Parameters below as of 20 Jan 2023 05:46  Patient On (Oxygen Delivery Method): nasal cannula  O2 Flow (L/min): 4      PHYSICAL EXAM:  GENERAL: NAD, well-groomed, well-developed  NERVOUS SYSTEM:  Alert & Oriented X3, Good concentration;  PULM: Clear to auscultation bilaterally  CARDIAC: Regular rate and rhythm;  GI: Soft, Nontender, Nondistended; Bowel sounds present  EXTREMITIES: significant lower leg edema noted     Consultant(s) Notes Reviewed:  [x ] YES  [ ] NO  Care Discussed with Consultants/Other Providers [ x] YES  [ ] NO    LABS:                            10.0   4.67  )-----------( 190      ( 20 Jan 2023 08:46 )             32.9   01-20    134<L>  |  105  |  14  ----------------------------<  66<L>  5.6<H>   |  21  |  1.4    Ca    8.9      20 Jan 2023 08:46  Mg     1.6     01-20    TPro  6.8  /  Alb  3.3<L>  /  TBili  1.5<H>  /  DBili  x   /  AST  16  /  ALT  <5  /  AlkPhos  75  01-20            Culture - Blood (collected 18 Jan 2023 21:11)  Source: .Blood Blood-Peripheral  Preliminary Report (20 Jan 2023 07:01):    No growth to date.    Culture - Blood (collected 18 Jan 2023 21:11)  Source: .Blood Blood-Peripheral  Gram Stain (20 Jan 2023 06:08):    Growth in aerobic bottle: Gram positive cocci in pairs  Preliminary Report (20 Jan 2023 06:09):    Growth in aerobic bottle: Gram positive cocci in pairs    ***Blood Panel PCR results on this specimen are available    approximately 3 hours after the Gram stain result.***    Gram stain, PCR, and/or culture results may not always    correspond due to difference in methodologies.    ************************************************************    This PCR assay was performed by multiplex PCR. This    Assay tests for 66 bacterial and resistance gene targets.    Please refer to the [facility] test directory    at [email or web address] for details.  Organism: Blood Culture PCR (20 Jan 2023 07:09)  Organism: Blood Culture PCR (20 Jan 2023 07:09)      budesonide 160 MICROgram(s)/formoterol 4.5 MICROgram(s) Inhaler 2 Puff(s) Inhalation two times a day  clopidogrel Tablet 75 milliGRAM(s) Oral daily  dextrose 5%. 1000 milliLiter(s) IV Continuous <Continuous>  dextrose 5%. 1000 milliLiter(s) IV Continuous <Continuous>  dextrose 50% Injectable 25 Gram(s) IV Push once  dextrose 50% Injectable 12.5 Gram(s) IV Push once  dextrose 50% Injectable 25 Gram(s) IV Push once  dextrose Oral Gel 15 Gram(s) Oral once PRN  enoxaparin Injectable 40 milliGRAM(s) SubCutaneous every 24 hours  fenofibrate Tablet 145 milliGRAM(s) Oral daily  furosemide   Injectable 40 milliGRAM(s) IV Push once  gabapentin 300 milliGRAM(s) Oral three times a day  glucagon  Injectable 1 milliGRAM(s) IntraMuscular once  insulin lispro (ADMELOG) corrective regimen sliding scale   SubCutaneous three times a day before meals  levothyroxine 25 MICROGram(s) Oral daily  metolazone 2.5 milliGRAM(s) Oral once  metoprolol succinate  milliGRAM(s) Oral daily  oxyCODONE    IR 5 milliGRAM(s) Oral every 8 hours PRN  pantoprazole    Tablet 40 milliGRAM(s) Oral before breakfast    57 year old female with PMH of COPD on 4 L home O2, CORNELIUS on CPAP, HFrEF (19% on TTE 02/2022, s/p ICD, pulmonary HTN, CAD s/p PCI to RCA)1, HTN, HLD, CVA with residual LLE weakness, DM, active smoker on home O2, presented to the ED for foot pain.   Patient does not know any of her meds or doses; verify with [employer] in the AM. Meds started using surescripts.       1. SOB and severe lower extremity edema due to acute systolic CHF   - Admit to medicine         - CXR:CHF. BNP elevated   - COnt lasix 40mg IV bid. Give metolazone 2.5 mg po *1   * pt was on torsemide 20mg po daily   - Stop spironolactone for now due to K>5.5   - Stop abs no signs of cellulitis  - Blood cx : coag negative staph Likely contamination. no need for abs   - Cont entresto   - Cont metoprolol   * Last echo showed EF:19%   - EP consult:pending     2. hx of chronic hypoxic resp failure due to COPD   - Cont inhalers  - not  in acute exacerbation     3. CORNELIUS  - COnt CPAP       4. CAD s/p PCI / HTN   - c/w plavix   - c/w entresto   - c/w metoprolol 100mg ER     5. DM-II complicated  - hold home farxiga and metformin   - fingersticks and insulin PRN     6. Chronic normocytic anemia   - Hb 10.3; can f/u Fe studies     7. D LD   - verify if patient on statin ([facility] pharmacy)     8. Hx of CVA  - c/w plavix   - verify if patient on statin ([facility] pharmacy)     DVT ppx: Lovenox   GI ppx: protonix   Diet: dash   Dispo: acute                 Case Discussed with House Staff   39  minutes spent on total encounter; more than 50% of the visit was spent counseling and/or coordinating care by the attending physician.   Spectra [phone]     WILLIAN MARY  57y  [facility] T5-3C 012 B      Patient is a 57y old  Female who presents with a chief complaint of     INTERVAL HPI/OVERNIGHT EVENTS:  Patient complaining of severe leg pain due to swelling  she is on 4 liter oxygen which is her baseline   no other complains noted             FAMILY HISTORY:  FH: diabetes mellitus (Father, Mother)      T(C): 37.3 (01-20-23 @ 04:48), Max: 37.3 (01-20-23 @ 04:48)  HR: 78 (01-20-23 @ 05:46) (78 - 99)  BP: 99/57 (01-20-23 @ 05:46) (92/50 - 99/65)  RR: 18 (01-20-23 @ 05:46) (18 - 20)  SpO2: 96% (01-20-23 @ 05:46) (96% - 98%)  Wt(kg): --Vital Signs Last 24 Hrs  T(C): 37.3 (20 Jan 2023 04:48), Max: 37.3 (20 Jan 2023 04:48)  T(F): 99.1 (20 Jan 2023 04:48), Max: 99.1 (20 Jan 2023 04:48)  HR: 78 (20 Jan 2023 05:46) (78 - 99)  BP: 99/57 (20 Jan 2023 05:46) (92/50 - 99/65)  BP(mean): 72 (20 Jan 2023 00:15) (72 - 72)  RR: 18 (20 Jan 2023 05:46) (18 - 20)  SpO2: 96% (20 Jan 2023 05:46) (96% - 98%)    Parameters below as of 20 Jan 2023 05:46  Patient On (Oxygen Delivery Method): nasal cannula  O2 Flow (L/min): 4      PHYSICAL EXAM:  GENERAL: NAD, well-groomed, well-developed  NERVOUS SYSTEM:  Alert & Oriented X3, Good concentration;  PULM: Clear to auscultation bilaterally  CARDIAC: Regular rate and rhythm;  GI: Soft, Nontender, Nondistended; Bowel sounds present  EXTREMITIES: significant lower leg edema noted     Consultant(s) Notes Reviewed:  [x ] YES  [ ] NO  Care Discussed with Consultants/Other Providers [ x] YES  [ ] NO    LABS:                            10.0   4.67  )-----------( 190      ( 20 Jan 2023 08:46 )             32.9   01-20    134<L>  |  105  |  14  ----------------------------<  66<L>  5.6<H>   |  21  |  1.4    Ca    8.9      20 Jan 2023 08:46  Mg     1.6     01-20    TPro  6.8  /  Alb  3.3<L>  /  TBili  1.5<H>  /  DBili  x   /  AST  16  /  ALT  <5  /  AlkPhos  75  01-20            Culture - Blood (collected 18 Jan 2023 21:11)  Source: .Blood Blood-Peripheral  Preliminary Report (20 Jan 2023 07:01):    No growth to date.    Culture - Blood (collected 18 Jan 2023 21:11)  Source: .Blood Blood-Peripheral  Gram Stain (20 Jan 2023 06:08):    Growth in aerobic bottle: Gram positive cocci in pairs  Preliminary Report (20 Jan 2023 06:09):    Growth in aerobic bottle: Gram positive cocci in pairs    ***Blood Panel PCR results on this specimen are available    approximately 3 hours after the Gram stain result.***    Gram stain, PCR, and/or culture results may not always    correspond due to difference in methodologies.    ************************************************************    This PCR assay was performed by multiplex PCR. This    Assay tests for 66 bacterial and resistance gene targets.    Please refer to the [facility] test directory    at [email or web address] for details.  Organism: Blood Culture PCR (20 Jan 2023 07:09)  Organism: Blood Culture PCR (20 Jan 2023 07:09)      budesonide 160 MICROgram(s)/formoterol 4.5 MICROgram(s) Inhaler 2 Puff(s) Inhalation two times a day  clopidogrel Tablet 75 milliGRAM(s) Oral daily  dextrose 5%. 1000 milliLiter(s) IV Continuous <Continuous>  dextrose 5%. 1000 milliLiter(s) IV Continuous <Continuous>  dextrose 50% Injectable 25 Gram(s) IV Push once  dextrose 50% Injectable 12.5 Gram(s) IV Push once  dextrose 50% Injectable 25 Gram(s) IV Push once  dextrose Oral Gel 15 Gram(s) Oral once PRN  enoxaparin Injectable 40 milliGRAM(s) SubCutaneous every 24 hours  fenofibrate Tablet 145 milliGRAM(s) Oral daily  furosemide   Injectable 40 milliGRAM(s) IV Push once  gabapentin 300 milliGRAM(s) Oral three times a day  glucagon  Injectable 1 milliGRAM(s) IntraMuscular once  insulin lispro (ADMELOG) corrective regimen sliding scale   SubCutaneous three times a day before meals  levothyroxine 25 MICROGram(s) Oral daily  metolazone 2.5 milliGRAM(s) Oral once  metoprolol succinate  milliGRAM(s) Oral daily  oxyCODONE    IR 5 milliGRAM(s) Oral every 8 hours PRN  pantoprazole    Tablet 40 milliGRAM(s) Oral before breakfast    57 year old female with PMH of COPD on 4 L home O2, CORNELIUS on CPAP, HFrEF (19% on TTE 02/2022, s/p ICD, pulmonary HTN, CAD s/p PCI to RCA)1, HTN, HLD, CVA with residual LLE weakness, DM, active smoker on home O2, presented to the ED for foot pain.   Patient does not know any of her meds or doses; verify with [facility] in the AM. Meds started using surescripts.       1. SOB and severe lower extremity edema due to acute systolic CHF   - Admit to medicine         - CXR:CHF. BNP elevated   - COnt lasix 40mg IV bid. (metolazone was not given due to soft bp)   * pt was on torsemide 20mg po daily   - Stop spironolactone for now due to K>5.5   - Stop abs no signs of cellulitis  - Blood cx : coag negative staph Likely contamination. no need for abs   - Cont entresto   - Cont metoprolol   * Last echo showed EF:19%   - EP consult:pending     2. hx of chronic hypoxic resp failure due to COPD   - Cont inhalers  - not  in acute exacerbation     3. CORNELIUS  - COnt CPAP       4. CAD s/p PCI / HTN   - c/w plavix   - c/w entresto   - c/w metoprolol 100mg ER     5. DM-II complicated  - hold home farxiga and metformin   - fingersticks and insulin PRN     6. Chronic normocytic anemia   - Hb 10.3; can f/u Fe studies     7. D LD   - verify if patient on statin ([facility] pharmacy)     8. Hx of CVA  - c/w plavix   - verify if patient on statin ([facility] pharmacy)     DVT ppx: Lovenox   GI ppx: protonix   Diet: dash   Dispo: acute                 Case Discussed with House Staff   39  minutes spent on total encounter; more than 50% of the visit was spent counseling and/or coordinating care by the attending physician.   Spectra [phone]

## 2023-01-20 NOTE — MEDICAL STUDENT PROGRESS NOTE(EDUCATION) - NS MD HP STUD ASPLAN ASSES FT
Patient is a 57 year old female with PMH of HFrEF, diabetes mellitus type 2, CAD s/p PCI, HTN, HLD presenting for bilateral lower foot pain with associated swelling and erythema, chills with no fevers, acute worsening of shortness of breath with no chest pain otherwise. Patient was admitted for bilateral foot swelling for acute CHF exacerbation vs. less likely cellulitis.

## 2023-01-20 NOTE — PHYSICAL THERAPY INITIAL EVALUATION ADULT - SPECIFY REASON(S)
Pt approached at bedside for PT initial evaluation , however , pt c/o pain on B/L LEs observed erythema and swelling on B/L legs and feet. left foot worse than right .

## 2023-01-20 NOTE — MEDICAL STUDENT PROGRESS NOTE(EDUCATION) - NS MD HP STUD ASPLAN PLAN FT
#bilateral foot swelling, acute CHF exacerbation vs. cellulitis  - Patient presented with bilateral foot pain with swelling and erythema  - No clinical signs of cellulitis on physical exam  - Blood culture positive for coagulase negative staphylococcus, likely contamination.  - Patient was initially given antibiotics; s/p Rocephin and Flagyl in ED, cefazolin 1000 mg IV q8h.  - Stop antibiotics due to low suspicion for cellulitis.  - CXR significant for cardiomegaly, cephalization.  - Bilateral LE duplex negative.  - Serum pro-BNP of 8880  - TTE 12/2/22 showed EF of 25-30%  - Continue with diuresis with Lasix IV 40 mg BID.  - EP consult ordered, pending, for ICD re-implantation.    #COPD  - On 4 L O2 therapy    #CAD  - s/p PCI    #Hypertension  - continue with plavix  - continue with metoprolol 100 mg PO qd  - continue with entresto     #Hyperlipidemia  #History of CVA  - continue with plavix    Type 2 diabetes mellitus  - Hold home farxiga and metformin  - Monitor blood glucose, insulin PRN      - DVT Prophylaxis: Clopidogrel, Enoxaparin  - Diet: DASH/TLC  - GI Prophylaxis: Protonix  - Activity: Increase as tolerated      Dispo: Continue with diuresis.

## 2023-01-20 NOTE — MEDICAL STUDENT PROGRESS NOTE(EDUCATION) - SUBJECTIVE AND OBJECTIVE BOX
WILLIAN MARY 57y Female  MRN#: [id number]   Hospital Day: 1d    HPI:  57 year old female with PMH of COPD on 4 L home O2, CORNELIUS on CPAP, HFrEF (19% on TTE 02/2022, s/p ICD, pulmonary HTN, CAD s/p PCI to RCA)1, HTN, HLD, CVA with residual LLE weakness, DM, active smoker on home O2, presented to the ED for foot pain.     She reports that pain started in bilateral legs two days ago. She also noticed increased swelling in both legs. Pain was so intense, she could no longer ambulate or touch her feet. Symptoms are worse on her L leg. She also reports that her chronic shortness of breath has been worse than usual. She also endorses that she generally feels more swollen than usual.       ED Course:   Vitals: T 98, , /62, RR 18, 99% on 4L NC  Labs: unremarkable   Imaging: none performed  Patient admitted for bilateral LE edema, CHF Exacerbation vs. Cellulitis (less likely)   Patient does not know any of her meds or doses; verify with [facility] in the AM. Meds started using surescripts.        (19 Jan 2023 03:19)      SUBJECTIVE  Patient is a 57y old Female who presents with a chief complaint of Currently admitted to medicine with the primary diagnosis of Bilateral foot pain      INTERVAL HPI AND OVERNIGHT EVENTS:  Patient was examined and seen at bedside. Patient reports persistent bilateral pain in the feet, persistent dyspnea. Patient did not have any overnight events.      OBJECTIVE  PAST MEDICAL & SURGICAL HISTORY  Hypertension  Hyperlipidemia  Anxiety and depression  COPD, severe  CHF (congestive heart failure)  Cerebrovascular accident (CVA)  Multiple  Type 2 diabetes mellitus  CKD (chronic kidney disease), stage II  No significant past surgical history      ALLERGIES:  No Known Allergies    MEDICATIONS:  STANDING MEDICATIONS  budesonide 160 MICROgram(s)/formoterol 4.5 MICROgram(s) Inhaler 2 Puff(s) Inhalation two times a day  clopidogrel Tablet 75 milliGRAM(s) Oral daily  dextrose 5%. 1000 milliLiter(s) IV Continuous <Continuous>  dextrose 5%. 1000 milliLiter(s) IV Continuous <Continuous>  dextrose 50% Injectable 25 Gram(s) IV Push once  dextrose 50% Injectable 12.5 Gram(s) IV Push once  dextrose 50% Injectable 25 Gram(s) IV Push once  enoxaparin Injectable 40 milliGRAM(s) SubCutaneous every 24 hours  fenofibrate Tablet 145 milliGRAM(s) Oral daily  furosemide   Injectable 40 milliGRAM(s) IV Push once  furosemide   Injectable 40 milliGRAM(s) IV Push once  gabapentin 300 milliGRAM(s) Oral three times a day  glucagon  Injectable 1 milliGRAM(s) IntraMuscular once  insulin lispro (ADMELOG) corrective regimen sliding scale   SubCutaneous three times a day before meals  levothyroxine 25 MICROGram(s) Oral daily  metoprolol succinate  milliGRAM(s) Oral daily  pantoprazole    Tablet 40 milliGRAM(s) Oral before breakfast    PRN MEDICATIONS  dextrose Oral Gel 15 Gram(s) Oral once PRN  oxyCODONE    IR 5 milliGRAM(s) Oral every 8 hours PRN      VITAL SIGNS: Last 24 Hours  T(C): 36.8 (20 Jan 2023 14:51), Max: 37.3 (20 Jan 2023 04:48)  T(F): 98.2 (20 Jan 2023 14:51), Max: 99.1 (20 Jan 2023 04:48)  HR: 82 (20 Jan 2023 14:51) (78 - 91)  BP: 95/58 (20 Jan 2023 14:51) (92/50 - 99/57)  BP(mean): 72 (20 Jan 2023 00:15) (72 - 72)  RR: 18 (20 Jan 2023 05:46) (18 - 20)  SpO2: 96% (20 Jan 2023 05:46) (96% - 98%)    LABS:                        10.0   4.67  )-----------( 190      ( 20 Jan 2023 08:46 )             32.9     01-20    134<L>  |  105  |  14  ----------------------------<  66<L>  5.6<H>   |  21  |  1.4    Ca    8.9      20 Jan 2023 08:46  Mg     1.6     01-20    TPro  6.8  /  Alb  3.3<L>  /  TBili  1.5<H>  /  DBili  x   /  AST  16  /  ALT  <5  /  AlkPhos  75  01-20    PT/INR - ( 18 Jan 2023 21:15 )   PT: 14.40 sec;   INR: 1.25 ratio         PTT - ( 18 Jan 2023 21:15 )  PTT:39.6 sec          Culture - Blood (collected 18 Jan 2023 21:11)  Source: .Blood Blood-Peripheral  Preliminary Report (20 Jan 2023 07:01):    No growth to date.    Culture - Blood (collected 18 Jan 2023 21:11)  Source: .Blood Blood-Peripheral  Gram Stain (20 Jan 2023 06:08):    Growth in aerobic bottle: Gram positive cocci in pairs  Preliminary Report (20 Jan 2023 06:09):    Growth in aerobic bottle: Gram positive cocci in pairs    ***Blood Panel PCR results on this specimen are available    approximately 3 hours after the Gram stain result.***    Gram stain, PCR, and/or culture results may not always    correspond due to difference in methodologies.    ************************************************************    This PCR assay was performed by multiplex PCR. This    Assay tests for 66 bacterial and resistance gene targets.    Please refer to the [facility] test directory    at [email or web address] for details.  Organism: Blood Culture PCR (20 Jan 2023 07:09)  Organism: Blood Culture PCR (20 Jan 2023 07:09)          RADIOLOGY:      PHYSICAL EXAM:  CONSTITUTIONAL: No acute distress, well-developed, well-groomed, AAOx3  HEAD: Atraumatic, normocephalic  EYES: EOM intact, PERRLA, conjunctiva and sclera clear  ENT: Supple, no masses, no thyromegaly, no bruits, no JVD; moist mucous membranes  PULMONARY: Clear to auscultation bilaterally; no wheezes, rales, or rhonchi  CARDIOVASCULAR: Regular rate and rhythm; no murmurs, rubs, or gallops  GASTROINTESTINAL: Soft, non-tender, non-distended; bowel sounds present  MUSCULOSKELETAL: 2+ peripheral pulses; no clubbing, no cyanosis, bilateral 2+ pitting edema. Tenderness to palpation of the bilateral feet, ankles.  NEUROLOGY: non-focal  SKIN: Bilateral lower extremity venous stasis dermatitis

## 2023-01-20 NOTE — PROGRESS NOTE ADULT - SUBJECTIVE AND OBJECTIVE BOX
WILLIAN MARY 57y Female  MRN#: [id number]   Hospital Day: 1d    HPI:  57 year old female with PMH of COPD on 4 L home O2, CORNELIUS on CPAP, HFrEF (19% on TTE 02/2022, s/p ICD, pulmonary HTN, CAD s/p PCI to RCA)1, HTN, HLD, CVA with residual LLE weakness, DM, active smoker on home O2, presented to the ED for foot pain.     She reports that pain started in bilateral legs two days ago. She also noticed increased swelling in both legs. Pain was so intense, she could no longer ambulate or touch her feet. Symptoms are worse on her L leg. She also reports that her chronic shortness of breath has been worse than usual. She also endorses that she generally feels more swollen than usual.       ED Course:   Vitals: T 98, , /62, RR 18, 99% on 4L NC  Labs: unremarkable   Imaging: none performed    Patient admitted for bilateral LE edema, CHF Exacerbation vs. Cellulitis (less likely)     Patient does not know any of her meds or doses; verify with [facility] in the AM. Meds started using surescripts.        (19 Jan 2023 03:19)      SUBJECTIVE  Patient is a 57y old Female who presents with a chief complaint of Currently admitted to medicine with the primary diagnosis of Bilateral foot pain      INTERVAL HPI AND OVERNIGHT EVENTS:  Patient was examined and seen at bedside. This morning she is resting comfortably in bed and reports no issues or overnight events.      OBJECTIVE  PAST MEDICAL & SURGICAL HISTORY  Hypertension    Hyperlipidemia    Anxiety and depression    COPD, severe    CHF (congestive heart failure)    Cerebrovascular accident (CVA)  Multiple    Type 2 diabetes mellitus    CKD (chronic kidney disease), stage II    No significant past surgical history      ALLERGIES:  No Known Allergies    MEDICATIONS:  STANDING MEDICATIONS  budesonide 160 MICROgram(s)/formoterol 4.5 MICROgram(s) Inhaler 2 Puff(s) Inhalation two times a day  ceFAZolin   IVPB 1000 milliGRAM(s) IV Intermittent every 8 hours  ceFAZolin   IVPB      clopidogrel Tablet 75 milliGRAM(s) Oral daily  dextrose 5%. 1000 milliLiter(s) IV Continuous <Continuous>  dextrose 5%. 1000 milliLiter(s) IV Continuous <Continuous>  dextrose 50% Injectable 25 Gram(s) IV Push once  dextrose 50% Injectable 12.5 Gram(s) IV Push once  dextrose 50% Injectable 25 Gram(s) IV Push once  enoxaparin Injectable 40 milliGRAM(s) SubCutaneous every 24 hours  fenofibrate Tablet 145 milliGRAM(s) Oral daily  furosemide   Injectable 40 milliGRAM(s) IV Push daily  gabapentin 300 milliGRAM(s) Oral three times a day  glucagon  Injectable 1 milliGRAM(s) IntraMuscular once  insulin lispro (ADMELOG) corrective regimen sliding scale   SubCutaneous three times a day before meals  levothyroxine 25 MICROGram(s) Oral daily  metoprolol succinate  milliGRAM(s) Oral daily  pantoprazole    Tablet 40 milliGRAM(s) Oral before breakfast  spironolactone 25 milliGRAM(s) Oral daily    PRN MEDICATIONS  dextrose Oral Gel 15 Gram(s) Oral once PRN  oxyCODONE    IR 5 milliGRAM(s) Oral every 8 hours PRN      VITAL SIGNS: Last 24 Hours  T(C): 37.3 (20 Jan 2023 04:48), Max: 37.3 (20 Jan 2023 04:48)  T(F): 99.1 (20 Jan 2023 04:48), Max: 99.1 (20 Jan 2023 04:48)  HR: 78 (20 Jan 2023 05:46) (78 - 112)  BP: 99/57 (20 Jan 2023 05:46) (92/50 - 100/65)  BP(mean): 72 (20 Jan 2023 00:15) (72 - 72)  RR: 18 (20 Jan 2023 05:46) (16 - 20)  SpO2: 96% (20 Jan 2023 05:46) (96% - 98%)    LABS:                        9.4    4.77  )-----------( 176      ( 19 Jan 2023 04:30 )             30.5     01-19    138  |  109  |  11  ----------------------------<  95  5.6<H>   |  16<L>  |  1.2    Ca    8.8      19 Jan 2023 04:30  Mg     1.7     01-19    TPro  6.9  /  Alb  3.5  /  TBili  1.1  /  DBili  x   /  AST  20  /  ALT  <5  /  AlkPhos  96  01-19    PT/INR - ( 18 Jan 2023 21:15 )   PT: 14.40 sec;   INR: 1.25 ratio         PTT - ( 18 Jan 2023 21:15 )  PTT:39.6 sec          Culture - Blood (collected 18 Jan 2023 21:11)  Source: .Blood Blood-Peripheral  Preliminary Report (20 Jan 2023 07:01):    No growth to date.    Culture - Blood (collected 18 Jan 2023 21:11)  Source: .Blood Blood-Peripheral  Gram Stain (20 Jan 2023 06:08):    Growth in aerobic bottle: Gram positive cocci in pairs  Preliminary Report (20 Jan 2023 06:09):    Growth in aerobic bottle: Gram positive cocci in pairs    ***Blood Panel PCR results on this specimen are available    approximately 3 hours after the Gram stain result.***    Gram stain, PCR, and/or culture results may not always    correspond due to difference in methodologies.    ************************************************************    This PCR assay was performed by multiplex PCR. This    Assay tests for 66 bacterial and resistance gene targets.    Please refer to the [facility] test directory    at [email or web address] for details.  Organism: Blood Culture PCR (20 Jan 2023 07:09)  Organism: Blood Culture PCR (20 Jan 2023 07:09)        PHYSICAL EXAM:  CONSTITUTIONAL: No acute distress, well-developed, well-groomed, AAOx3  HEAD: Atraumatic, normocephalic  EYES: EOM intact, PERRLA, conjunctiva and sclera clear  ENT: Supple, no masses, no thyromegaly, no bruits, no JVD; moist mucous membranes  PULMONARY: Clear to auscultation bilaterally; no wheezes, rales, or rhonchi  CARDIOVASCULAR: Regular rate and rhythm; no murmurs, rubs, or gallops  GASTROINTESTINAL: Soft, non-tender, non-distended; bowel sounds present  MUSCULOSKELETAL: 2+ peripheral pulses; no clubbing, no cyanosis, no edema  NEUROLOGY: non-focal  SKIN: No rashes or lesions; warm and dry    ASSESSMENT & PLAN           WILLIAN MARY 57y Female  MRN#: [id number]   Hospital Day: 1d    HPI:  57 year old female with PMH of COPD on 4 L home O2, CORNELIUS on CPAP, HFrEF (19% on TTE 02/2022, s/p ICD, pulmonary HTN, CAD s/p PCI to RCA)1, HTN, HLD, CVA with residual LLE weakness, DM, active smoker on home O2, presented to the ED for foot pain.     She reports that pain started in bilateral legs two days ago. She also noticed increased swelling in both legs. Pain was so intense, she could no longer ambulate or touch her feet. Symptoms are worse on her L leg. She also reports that her chronic shortness of breath has been worse than usual. She also endorses that she generally feels more swollen than usual.       ED Course:   Vitals: T 98, , /62, RR 18, 99% on 4L NC  Labs: unremarkable   Imaging: none performed    Patient admitted for bilateral LE edema, CHF Exacerbation vs. Cellulitis (less likely)     Patient does not know any of her meds or doses; verify with [facility] in the AM. Meds started using surescripts.        (19 Jan 2023 03:19)      SUBJECTIVE  Patient is a 57y old Female who presents with a chief complaint of Currently admitted to medicine with the primary diagnosis of Bilateral foot pain      INTERVAL HPI AND OVERNIGHT EVENTS:  Patient was examined and seen at bedside. This morning she is sleeping in bed. She complained of bilateral lower extremity pain and heaviness L > R       OBJECTIVE  PAST MEDICAL & SURGICAL HISTORY  Hypertension    Hyperlipidemia    Anxiety and depression    COPD, severe    CHF (congestive heart failure)    Cerebrovascular accident (CVA)  Multiple    Type 2 diabetes mellitus    CKD (chronic kidney disease), stage II    No significant past surgical history      ALLERGIES:  No Known Allergies    MEDICATIONS:  STANDING MEDICATIONS  budesonide 160 MICROgram(s)/formoterol 4.5 MICROgram(s) Inhaler 2 Puff(s) Inhalation two times a day  ceFAZolin   IVPB 1000 milliGRAM(s) IV Intermittent every 8 hours  ceFAZolin   IVPB      clopidogrel Tablet 75 milliGRAM(s) Oral daily  dextrose 5%. 1000 milliLiter(s) IV Continuous <Continuous>  dextrose 5%. 1000 milliLiter(s) IV Continuous <Continuous>  dextrose 50% Injectable 25 Gram(s) IV Push once  dextrose 50% Injectable 12.5 Gram(s) IV Push once  dextrose 50% Injectable 25 Gram(s) IV Push once  enoxaparin Injectable 40 milliGRAM(s) SubCutaneous every 24 hours  fenofibrate Tablet 145 milliGRAM(s) Oral daily  furosemide   Injectable 40 milliGRAM(s) IV Push daily  gabapentin 300 milliGRAM(s) Oral three times a day  glucagon  Injectable 1 milliGRAM(s) IntraMuscular once  insulin lispro (ADMELOG) corrective regimen sliding scale   SubCutaneous three times a day before meals  levothyroxine 25 MICROGram(s) Oral daily  metoprolol succinate  milliGRAM(s) Oral daily  pantoprazole    Tablet 40 milliGRAM(s) Oral before breakfast  spironolactone 25 milliGRAM(s) Oral daily    PRN MEDICATIONS  dextrose Oral Gel 15 Gram(s) Oral once PRN  oxyCODONE    IR 5 milliGRAM(s) Oral every 8 hours PRN      VITAL SIGNS: Last 24 Hours  T(C): 37.3 (20 Jan 2023 04:48), Max: 37.3 (20 Jan 2023 04:48)  T(F): 99.1 (20 Jan 2023 04:48), Max: 99.1 (20 Jan 2023 04:48)  HR: 78 (20 Jan 2023 05:46) (78 - 112)  BP: 99/57 (20 Jan 2023 05:46) (92/50 - 100/65)  BP(mean): 72 (20 Jan 2023 00:15) (72 - 72)  RR: 18 (20 Jan 2023 05:46) (16 - 20)  SpO2: 96% (20 Jan 2023 05:46) (96% - 98%)    LABS:                        9.4    4.77  )-----------( 176      ( 19 Jan 2023 04:30 )             30.5     01-19    138  |  109  |  11  ----------------------------<  95  5.6<H>   |  16<L>  |  1.2    Ca    8.8      19 Jan 2023 04:30  Mg     1.7     01-19    TPro  6.9  /  Alb  3.5  /  TBili  1.1  /  DBili  x   /  AST  20  /  ALT  <5  /  AlkPhos  96  01-19    PT/INR - ( 18 Jan 2023 21:15 )   PT: 14.40 sec;   INR: 1.25 ratio         PTT - ( 18 Jan 2023 21:15 )  PTT:39.6 sec          Culture - Blood (collected 18 Jan 2023 21:11)  Source: .Blood Blood-Peripheral  Preliminary Report (20 Jan 2023 07:01):    No growth to date.    Culture - Blood (collected 18 Jan 2023 21:11)  Source: .Blood Blood-Peripheral  Gram Stain (20 Jan 2023 06:08):    Growth in aerobic bottle: Gram positive cocci in pairs  Preliminary Report (20 Jan 2023 06:09):    Growth in aerobic bottle: Gram positive cocci in pairs    ***Blood Panel PCR results on this specimen are available    approximately 3 hours after the Gram stain result.***    Gram stain, PCR, and/or culture results may not always    correspond due to difference in methodologies.    ************************************************************    This PCR assay was performed by multiplex PCR. This    Assay tests for 66 bacterial and resistance gene targets.    Please refer to the [facility] Labs test directory    at [email or web address] for details.  Organism: Blood Culture PCR (20 Jan 2023 07:09)  Organism: Blood Culture PCR (20 Jan 2023 07:09)        PHYSICAL EXAM:  CONSTITUTIONAL: No acute distress, AAOx3  HEAD: Atraumatic, normocephalic  EYES: EOM intact, PERRLA, conjunctiva and sclera clear  ENT: Supple, no masses, no thyromegaly, no bruits, no JVD; moist mucous membranes  PULMONARY: Clear to auscultation bilaterally; no wheezes, rales, or rhonchi  CARDIOVASCULAR: Regular rate and rhythm; no murmurs, rubs, or gallops  GASTROINTESTINAL: Soft, non-tender, non-distended; bowel sounds present  MUSCULOSKELETAL: 2+ peripheral pulses, +3 bilateral LLE reaching knees, erythema of bilateral feet   NEUROLOGY: can't move left leg bcz of heaviness and pain       ASSESSMENT & PLAN  57 year old female with PMH of COPD on 4 L home O2, CORNELIUS on CPAP, HFrEF (19% on TTE 02/2022, s/p ICD, pulmonary HTN, CAD s/p PCI to RCA)1, HTN, HLD, CVA with residual LLE weakness, DM, active smoker on home O2, presented to the ED for foot pain.     # bilateral LE edema, CHF Exacerbation vs. Cellulitis (less likely)   # Chronic Hypoxic Respiratory Failure- slightly worse   # HFrEF   - No out of proportion tenderness, open wound, or purulence on exam   - subjective dyspnea on home 4L NC   - s/p rocephin and flagyl in ED, but changes consistent with venous stasis, not cellulits; no leukocytosis   - BNP 8880, last TTE EF 25-30%   - CXR showed cardiomegaly and cephalization   - bilateral LE duplex negative   - c/w entresto   - f/u cultures and procal   - started on lasix 40 mg IV BID   - cefepime stopped ( does not look like cellulitis )  - blood cx +ve for coagulase negative gram + cocci, f/u repeat ( probably contaminated )  - f/u arterial duplex    - f/u EP consult for life vest or AICD re-implantation ( AICD removed previously bcz of bacteremia )     # COPD - on home 4L O2  # CORNELIUS - on CPAP at home   - c/w symbicort     # CAD s/p PCI   # HTN   - c/w plavix   - c/w entresto   - c/w metoprolol 100mg ER     # DM   - hold home farxiga and metformin   - fingersticks and insulin PRN     # Chronic normocytic anemia   - Hb 10.3; can f/u Fe studies      # DLD     # Hx of CVA  - c/w plavix   - verify if patient on statin ([facility] pharmacy)     DVT ppx: Lovenox   GI ppx: protonix   Diet: dash   Dispo: acute

## 2023-01-20 NOTE — PATIENT PROFILE ADULT - FALL HARM RISK - HARM RISK INTERVENTIONS
Assistance with ambulation/Assistance OOB with selected safe patient handling equipment/Communicate Risk of Fall with Harm to all staff/Discuss with provider need for PT consult/Monitor gait and stability/Reinforce activity limits and safety measures with patient and family/Tailored Fall Risk Interventions/Visual Cue: Yellow wristband and red socks/Other:/Bed in lowest position, wheels locked, appropriate side rails in place/Call bell, personal items and telephone in reach/Instruct patient to call for assistance before getting out of bed or chair/Non-slip footwear when patient is out of bed/[place] to call system/Physically safe environment - no spills, clutter or unnecessary equipment/Purposeful Proactive Rounding/Room/bathroom lighting operational, light cord in reach

## 2023-01-21 LAB
ALBUMIN SERPL ELPH-MCNC: 3.4 G/DL — LOW (ref 3.5–5.2)
ALP SERPL-CCNC: 72 U/L — SIGNIFICANT CHANGE UP (ref 30–115)
ALT FLD-CCNC: <5 U/L — SIGNIFICANT CHANGE UP (ref 0–41)
ANION GAP SERPL CALC-SCNC: 9 MMOL/L — SIGNIFICANT CHANGE UP (ref 7–14)
AST SERPL-CCNC: 15 U/L — SIGNIFICANT CHANGE UP (ref 0–41)
BASOPHILS # BLD AUTO: 0.08 K/UL — SIGNIFICANT CHANGE UP (ref 0–0.2)
BASOPHILS NFR BLD AUTO: 1.5 % — HIGH (ref 0–1)
BILIRUB SERPL-MCNC: 1 MG/DL — SIGNIFICANT CHANGE UP (ref 0.2–1.2)
BUN SERPL-MCNC: 17 MG/DL — SIGNIFICANT CHANGE UP (ref 10–20)
CALCIUM SERPL-MCNC: 9 MG/DL — SIGNIFICANT CHANGE UP (ref 8.4–10.5)
CHLORIDE SERPL-SCNC: 105 MMOL/L — SIGNIFICANT CHANGE UP (ref 98–110)
CO2 SERPL-SCNC: 22 MMOL/L — SIGNIFICANT CHANGE UP (ref 17–32)
CREAT SERPL-MCNC: 1.6 MG/DL — HIGH (ref 0.7–1.5)
EGFR: 38 ML/MIN/1.73M2 — LOW
EOSINOPHIL # BLD AUTO: 0.04 K/UL — SIGNIFICANT CHANGE UP (ref 0–0.7)
EOSINOPHIL NFR BLD AUTO: 0.8 % — SIGNIFICANT CHANGE UP (ref 0–8)
GLUCOSE BLDC GLUCOMTR-MCNC: 127 MG/DL — HIGH (ref 70–99)
GLUCOSE BLDC GLUCOMTR-MCNC: 149 MG/DL — HIGH (ref 70–99)
GLUCOSE BLDC GLUCOMTR-MCNC: 167 MG/DL — HIGH (ref 70–99)
GLUCOSE BLDC GLUCOMTR-MCNC: 170 MG/DL — HIGH (ref 70–99)
GLUCOSE SERPL-MCNC: 122 MG/DL — HIGH (ref 70–99)
HCT VFR BLD CALC: 31.5 % — LOW (ref 37–47)
HGB BLD-MCNC: 9.8 G/DL — LOW (ref 12–16)
IMM GRANULOCYTES NFR BLD AUTO: 0.4 % — HIGH (ref 0.1–0.3)
LYMPHOCYTES # BLD AUTO: 0.74 K/UL — LOW (ref 1.2–3.4)
LYMPHOCYTES # BLD AUTO: 14.1 % — LOW (ref 20.5–51.1)
MAGNESIUM SERPL-MCNC: 2.3 MG/DL — SIGNIFICANT CHANGE UP (ref 1.8–2.4)
MCHC RBC-ENTMCNC: 28.4 PG — SIGNIFICANT CHANGE UP (ref 27–31)
MCHC RBC-ENTMCNC: 31.1 G/DL — LOW (ref 32–37)
MCV RBC AUTO: 91.3 FL — SIGNIFICANT CHANGE UP (ref 81–99)
MONOCYTES # BLD AUTO: 0.75 K/UL — HIGH (ref 0.1–0.6)
MONOCYTES NFR BLD AUTO: 14.3 % — HIGH (ref 1.7–9.3)
NEUTROPHILS # BLD AUTO: 3.61 K/UL — SIGNIFICANT CHANGE UP (ref 1.4–6.5)
NEUTROPHILS NFR BLD AUTO: 68.9 % — SIGNIFICANT CHANGE UP (ref 42.2–75.2)
NRBC # BLD: 0 /100 WBCS — SIGNIFICANT CHANGE UP (ref 0–0)
PLATELET # BLD AUTO: 176 K/UL — SIGNIFICANT CHANGE UP (ref 130–400)
POTASSIUM SERPL-MCNC: 5 MMOL/L — SIGNIFICANT CHANGE UP (ref 3.5–5)
POTASSIUM SERPL-SCNC: 5 MMOL/L — SIGNIFICANT CHANGE UP (ref 3.5–5)
PROT SERPL-MCNC: 6.7 G/DL — SIGNIFICANT CHANGE UP (ref 6–8)
RBC # BLD: 3.45 M/UL — LOW (ref 4.2–5.4)
RBC # FLD: 19.4 % — HIGH (ref 11.5–14.5)
SODIUM SERPL-SCNC: 136 MMOL/L — SIGNIFICANT CHANGE UP (ref 135–146)
WBC # BLD: 5.24 K/UL — SIGNIFICANT CHANGE UP (ref 4.8–10.8)
WBC # FLD AUTO: 5.24 K/UL — SIGNIFICANT CHANGE UP (ref 4.8–10.8)

## 2023-01-21 PROCEDURE — 99223 1ST HOSP IP/OBS HIGH 75: CPT

## 2023-01-21 PROCEDURE — 71045 X-RAY EXAM CHEST 1 VIEW: CPT | Mod: 26

## 2023-01-21 PROCEDURE — 99232 SBSQ HOSP IP/OBS MODERATE 35: CPT

## 2023-01-21 RX ORDER — ATORVASTATIN CALCIUM 80 MG/1
80 TABLET, FILM COATED ORAL AT BEDTIME
Refills: 0 | Status: DISCONTINUED | OUTPATIENT
Start: 2023-01-21 | End: 2023-02-04

## 2023-01-21 RX ADMIN — BUDESONIDE AND FORMOTEROL FUMARATE DIHYDRATE 2 PUFF(S): 160; 4.5 AEROSOL RESPIRATORY (INHALATION) at 08:06

## 2023-01-21 RX ADMIN — OXYCODONE HYDROCHLORIDE 5 MILLIGRAM(S): 5 TABLET ORAL at 21:15

## 2023-01-21 RX ADMIN — ATORVASTATIN CALCIUM 80 MILLIGRAM(S): 80 TABLET, FILM COATED ORAL at 21:32

## 2023-01-21 RX ADMIN — OXYCODONE HYDROCHLORIDE 5 MILLIGRAM(S): 5 TABLET ORAL at 16:03

## 2023-01-21 RX ADMIN — OXYCODONE HYDROCHLORIDE 5 MILLIGRAM(S): 5 TABLET ORAL at 08:39

## 2023-01-21 RX ADMIN — OXYCODONE HYDROCHLORIDE 5 MILLIGRAM(S): 5 TABLET ORAL at 09:30

## 2023-01-21 RX ADMIN — OXYCODONE HYDROCHLORIDE 5 MILLIGRAM(S): 5 TABLET ORAL at 20:15

## 2023-01-21 RX ADMIN — Medication 40 MILLIGRAM(S): at 06:31

## 2023-01-21 RX ADMIN — Medication 145 MILLIGRAM(S): at 12:54

## 2023-01-21 RX ADMIN — CLOPIDOGREL BISULFATE 75 MILLIGRAM(S): 75 TABLET, FILM COATED ORAL at 12:54

## 2023-01-21 RX ADMIN — Medication 100 MILLIGRAM(S): at 06:30

## 2023-01-21 RX ADMIN — BUDESONIDE AND FORMOTEROL FUMARATE DIHYDRATE 2 PUFF(S): 160; 4.5 AEROSOL RESPIRATORY (INHALATION) at 20:16

## 2023-01-21 RX ADMIN — OXYCODONE HYDROCHLORIDE 5 MILLIGRAM(S): 5 TABLET ORAL at 00:33

## 2023-01-21 RX ADMIN — GABAPENTIN 300 MILLIGRAM(S): 400 CAPSULE ORAL at 21:32

## 2023-01-21 RX ADMIN — PANTOPRAZOLE SODIUM 40 MILLIGRAM(S): 20 TABLET, DELAYED RELEASE ORAL at 06:30

## 2023-01-21 RX ADMIN — OXYCODONE HYDROCHLORIDE 5 MILLIGRAM(S): 5 TABLET ORAL at 17:00

## 2023-01-21 RX ADMIN — Medication 25 MICROGRAM(S): at 06:30

## 2023-01-21 RX ADMIN — GABAPENTIN 300 MILLIGRAM(S): 400 CAPSULE ORAL at 06:30

## 2023-01-21 RX ADMIN — Medication 1: at 11:56

## 2023-01-21 RX ADMIN — ENOXAPARIN SODIUM 40 MILLIGRAM(S): 100 INJECTION SUBCUTANEOUS at 06:30

## 2023-01-21 RX ADMIN — OXYCODONE HYDROCHLORIDE 5 MILLIGRAM(S): 5 TABLET ORAL at 00:03

## 2023-01-21 RX ADMIN — GABAPENTIN 300 MILLIGRAM(S): 400 CAPSULE ORAL at 12:54

## 2023-01-21 NOTE — CONSULT NOTE ADULT - NS ATTEND AMEND GEN_ALL_CORE FT
Not a candidate for Lifevest - see prior discussions  Patient is scheduled for ICD- see OP note in "[facility]"    Need ID clearance.   ? Repeat Blood Cx    Need diuresis  HF/cardio referral for assistance    Recall as needed

## 2023-01-21 NOTE — CONSULT NOTE ADULT - ASSESSMENT
ASSESSMENT  - Severe PAD with LE resting pain  - HFrEF s/p BiV AICD and explant due to infected pocket - in fluid overload  - CAD s/p PCI in 6/21  - History of pericardial effusion  - Pulmonary hypertension  - COPD on home oxygen - smoking still  - HTN, HLD, DM  - CVA with residual left sided weakness    RECOMMENDATIONS  - Will discuss possible vascular intervention  - Continue Plavix for now  - Consider high intensity statin unless there's a contra-indications or if patient didn't tolerate it in the past  - Smoking cessation  ASSESSMENT  - Severe bilateral PAD with LE resting pain, Clare 4  - HFrEF s/p BiV AICD and explant due to infected pocket - in fluid overload  - CAD s/p PCI in 6/21  - History of pericardial effusion  - Pulmonary hypertension  - COPD on home oxygen - smoking still  - HTN, HLD, DM  - CVA with residual left sided weakness    RECOMMENDATIONS  - Will discuss possible vascular intervention  - Continue Plavix for now  - Consider high intensity statin unless there's a contra-indications or if patient didn't tolerate it in the past  - Smoking cessation

## 2023-01-21 NOTE — CONSULT NOTE ADULT - ATTENDING COMMENTS
57F with HFrEF s/p ICD, ICD site infection s/p explant 11/2022 (plans for re-implant 2/13/23), pulmonary hypertension, CAD s/p PCI to RCA 6/2021, CVA here for worsening bilateral lower extremity pain. Consulted for PAD with rest pain, Clare 4. No wounds.     Lower extremity arterial US with low resistance waveforms concerning for inflow disease and severe bilateral superficial femoral artery disease.   Labs with rising Cr    Recommend cross-sectional imaging when renal function stable  Continue Plavix and atorvastatin  Discussed plan with patient and her brotherPérez  Will follow    Arpita Valentine MD  Vascular Cardiology Attending  Please text or call via MS Teams with questions

## 2023-01-21 NOTE — CONSULT NOTE ADULT - SUBJECTIVE AND OBJECTIVE BOX
Patient is a 57y old  Female who presents with a chief complaint of       HPI:  57 year old female with PMH of COPD on 4 L home O2, CORNELIUS on CPAP, HFrEF (19% on TTE 2022, s/p ICD, pulmonary HTN, CAD s/p PCI to RCA)1, HTN, HLD, CVA with residual LLE weakness, DM, active smoker on home O2, presented to the ED for foot pain.     She reports that pain started in bilateral legs two days ago. She also noticed increased swelling in both legs. Pain was so intense, she could no longer ambulate or touch her feet. Symptoms are worse on her L leg. She also reports that her chronic shortness of breath has been worse than usual. She also endorses that she generally feels more swollen than usual.       ED Course:   Vitals: T 98, , /62, RR 18, 99% on 4L NC  Labs: unremarkable   Imaging: none performed    Patient admitted for bilateral LE edema, CHF Exacerbation vs. Cellulitis (less likely)     Patient does not know any of her meds or doses; verify with [facility] in the AM. Meds started using surescripts.        (2023 03:19)      Electrophysiology:  57y Female with h/o COPD on 3L home O2, CORNELIUS, CPAP, CVA with residual LLLE weakness, DM, HTN, HLD, CAD, PCI, pulmonary HTN, HFREF 15-20% s/p BiVICD , was admitted  with sepsis, pocket infection, positive blood cultures, device was removed at that time. Patient completed Abx course and is scheduled for device re-implantation on   Patient is now admitted with HF exacerbation and fluid overload being diuresed.  Noted to have positive blood culture on admission, one set only, repeat pending    REVIEW OF SYSTEMS    [x ] A ten-point review of systems was otherwise negative except as noted.  [ ] Due to altered mental status/intubation, subjective information were not able to be obtained from the patient. History was obtained, to the extent possible, from review of the chart and collateral sources of information.      PAST MEDICAL & SURGICAL HISTORY:  Hypertension      Hyperlipidemia      Anxiety and depression      COPD, severe      CHF (congestive heart failure)      Cerebrovascular accident (CVA)  Multiple      Type 2 diabetes mellitus      CKD (chronic kidney disease), stage II      No significant past surgical history          Home Medications:  Farxiga 10 mg oral tablet: 1 tab(s) orally once a day (2023 11:30)  fenofibrate 145 mg oral tablet: 1 tab(s) orally once a day (2023 11:30)  magnesium gluconate 500 mg oral tablet: 1 tab(s) orally 2 times a day (2023 11:30)  metoprolol succinate 100 mg oral tablet, extended release: 1 tab(s) orally once a day (2023 11:30)  pantoprazole 40 mg oral delayed release tablet: 1 tab(s) orally once a day (before a meal) (2023 11:30)  Synthroid 25 mcg (0.025 mg) oral tablet: 1 tab(s) orally once a day (2023 11:30)      Allergies:  No Known Allergies      FAMILY HISTORY:  FH: diabetes mellitus (Father, Mother)        SOCIAL HISTORY:    CIGARETTES:  ALCOHOL:  MARIJUANA:  ILLICIT DRUGS:        PREVIOUS DIAGNOSTIC TESTING:      ECHO  FINDINGS:    STRESS  FINDINGS:    CATHETERIZATION  FINDINGS:    ELECTROPHYSIOLOGY STUDY  FINDINGS:    CAROTID ULTRASOUND:  FINDINGS    VENOUS DUPLEX SCAN:  FINDINGS:    CHEST CT PULMONARY ANGIO with IV Contrast:  FINDINGS:      MEDICATIONS  (STANDING):  atorvastatin 80 milliGRAM(s) Oral at bedtime  budesonide 160 MICROgram(s)/formoterol 4.5 MICROgram(s) Inhaler 2 Puff(s) Inhalation two times a day  clopidogrel Tablet 75 milliGRAM(s) Oral daily  dextrose 5%. 1000 milliLiter(s) (100 mL/Hr) IV Continuous <Continuous>  dextrose 5%. 1000 milliLiter(s) (50 mL/Hr) IV Continuous <Continuous>  dextrose 50% Injectable 25 Gram(s) IV Push once  dextrose 50% Injectable 12.5 Gram(s) IV Push once  dextrose 50% Injectable 25 Gram(s) IV Push once  enoxaparin Injectable 40 milliGRAM(s) SubCutaneous every 24 hours  fenofibrate Tablet 145 milliGRAM(s) Oral daily  gabapentin 300 milliGRAM(s) Oral three times a day  glucagon  Injectable 1 milliGRAM(s) IntraMuscular once  insulin lispro (ADMELOG) corrective regimen sliding scale   SubCutaneous three times a day before meals  levothyroxine 25 MICROGram(s) Oral daily  metoprolol succinate  milliGRAM(s) Oral daily  pantoprazole    Tablet 40 milliGRAM(s) Oral before breakfast    MEDICATIONS  (PRN):  dextrose Oral Gel 15 Gram(s) Oral once PRN Blood Glucose LESS THAN 70 milliGRAM(s)/deciliter  oxyCODONE    IR 5 milliGRAM(s) Oral four times a day PRN Moderate Pain (4 - 6)      Vital Signs Last 24 Hrs  T(C): 36.6 (2023 05:00), Max: 36.8 (2023 14:51)  T(F): 97.8 (2023 05:00), Max: 98.2 (2023 14:51)  HR: 90 (2023 05:00) (82 - 90)  BP: 104/59 (2023 05:00) (95/58 - 118/58)  BP(mean): --  RR: 18 (2023 05:00) (18 - 18)  SpO2: 97% (2023 05:00) (97% - 97%)    Parameters below as of 2023 05:00  Patient On (Oxygen Delivery Method): nasal cannula  O2 Flow (L/min): 4      PHYSICAL EXAM:    GENERAL: In no apparent distress, well nourished, and hydrated.  HEAD:  Atraumatic, Normocephalic  EYES: EOMI, PERRLA, conjunctiva and sclera clear  NECK: Supple and normal thyroid.  No JVD or carotid bruit.  Carotid pulse is 2+ bilaterally.  HEART: Regular rate and rhythm; No murmurs, rubs, or gallops.  PULMONARY: Clear to auscultation and perfusion.  No rales, wheezing, or rhonchi bilaterally.  ABDOMEN: Soft, Nontender, Nondistended; Bowel sounds present  EXTREMITIES:  2+ Peripheral Pulses, No clubbing, cyanosis, or edema  NEUROLOGICAL: Grossly nonfocal    I&O's Detail    2023 07:01  -  2023 07:00  --------------------------------------------------------  IN:    IV PiggyBack: 100 mL    Oral Fluid: 790 mL  Total IN: 890 mL    OUT:    Voided (mL): 1225 mL  Total OUT: 1225 mL    Total NET: -335 mL        Daily     Daily     INTERPRETATION OF TELEMETRY:    EC Lead ECG:   Ventricular Rate 112 BPM    Atrial Rate 112 BPM    P-R Interval 172 ms    QRS Duration 134 ms    Q-T Interval 356 ms    QTC Calculation(Bazett) 485 ms    P Axis 54 degrees    R Axis 202 degrees    T Axis 51 degrees    Diagnosis Line Sinus tachycardia  Non-specific intra-ventricular conduction block  Lateral infarct , age undetermined  Abnormal ECG    Confirmed by Asad Buitrago ([id number]) on 2023 8:04:59 AM (23 @ 21:36)        LABS:                        9.8    5.24  )-----------( 176      ( 2023 07:18 )             31.5         136  |  105  |  17  ----------------------------<  122<H>  5.0   |  22  |  1.6<H>    Ca    9.0      2023 07:18  Mg     2.3         TPro  6.7  /  Alb  3.4<L>  /  TBili  1.0  /  DBili  x   /  AST  15  /  ALT  <5  /  AlkPhos  72              BNPSerum Pro-Brain Natriuretic Peptide: 8880 pg/mL ( @ 04:30)        Culture - Blood (collected 23 @ 21:11)  Source: .Blood Blood-Peripheral  Preliminary Report (23 @ 07:01):    No growth to date.    Culture - Blood (collected 23 @ 21:11)  Source: .Blood Blood-Peripheral  Gram Stain (23 @ 06:08):    Growth in aerobic bottle: Gram positive cocci in pairs  Final Report (23 @ 22:23):    Growth in aerobic bottle: Staphylococcus haemolyticus    Coag Negative Staphylococcus    Single set isolate, possible contaminant. Contact    Microbiology if susceptibility testing clinically    indicated.    ***Blood Panel PCR results on this specimen are available    approximately 3 hours after the Gram stain result.***    Gram stain, PCR, and/or culture results may not always    correspond due to difference in methodologies.    ************************************************************    This PCR assay was performed by multiplex PCR. This    Assay tests for 66 bacterial and resistance gene targets.    Please refer to the [facility] test directory    at [email or web address] for details.  Organism: Blood Culture PCR (23 @ 22:23)  Organism: Blood Culture PCR (23 @ 22:23)      -  Coagulase negative Staphylococcus: Detec      Method Type: PCR            RADIOLOGY & ADDITIONAL STUDIES:       Patient is a 57y old  Female who presents with a chief complaint of       HPI:  57 year old female with PMH of COPD on 4 L home O2, CORNELIUS on CPAP, HFrEF (19% on TTE 2022, s/p ICD, pulmonary HTN, CAD s/p PCI to RCA)1, HTN, HLD, CVA with residual LLE weakness, DM, active smoker on home O2, presented to the ED for foot pain.     She reports that pain started in bilateral legs two days ago. She also noticed increased swelling in both legs. Pain was so intense, she could no longer ambulate or touch her feet. Symptoms are worse on her L leg. She also reports that her chronic shortness of breath has been worse than usual. She also endorses that she generally feels more swollen than usual.       ED Course:   Vitals: T 98, , /62, RR 18, 99% on 4L NC  Labs: unremarkable   Imaging: none performed    Patient admitted for bilateral LE edema, CHF Exacerbation vs. Cellulitis (less likely)     Patient does not know any of her meds or doses; verify with [facility] in the AM. Meds started using surescripts.        (2023 03:19)      Electrophysiology:  57y Female with h/o COPD on 3L home O2, CORNELIUS, CPAP, CVA with residual LLLE weakness, DM, HTN, HLD, CAD, PCI, pulmonary HTN, HFREF 15-20% s/p BiVICD , was admitted  with sepsis, pocket infection, positive blood cultures, device was removed at that time. Patient completed Abx course and is scheduled for device re-implantation on   Patient is now admitted with HF exacerbation and fluid overload being diuresed.  Noted to have positive blood culture on admission, one set only, repeat pending    REVIEW OF SYSTEMS    [x ] A ten-point review of systems was otherwise negative except as noted.  [ ] Due to altered mental status/intubation, subjective information were not able to be obtained from the patient. History was obtained, to the extent possible, from review of the chart and collateral sources of information.      PAST MEDICAL & SURGICAL HISTORY:  Hypertension      Hyperlipidemia      Anxiety and depression      COPD, severe      CHF (congestive heart failure)      Cerebrovascular accident (CVA)  Multiple      Type 2 diabetes mellitus      CKD (chronic kidney disease), stage II      No significant past surgical history          Home Medications:  Farxiga 10 mg oral tablet: 1 tab(s) orally once a day (2023 11:30)  fenofibrate 145 mg oral tablet: 1 tab(s) orally once a day (2023 11:30)  magnesium gluconate 500 mg oral tablet: 1 tab(s) orally 2 times a day (2023 11:30)  metoprolol succinate 100 mg oral tablet, extended release: 1 tab(s) orally once a day (2023 11:30)  pantoprazole 40 mg oral delayed release tablet: 1 tab(s) orally once a day (before a meal) (2023 11:30)  Synthroid 25 mcg (0.025 mg) oral tablet: 1 tab(s) orally once a day (2023 11:30)      Allergies:  No Known Allergies      FAMILY HISTORY:  FH: diabetes mellitus (Father, Mother)        SOCIAL HISTORY:    CIGARETTES:  ALCOHOL:  MARIJUANA:  ILLICIT DRUGS:        PREVIOUS DIAGNOSTIC TESTING:      ECHO  FINDINGS:  < from: TTE Echo Complete w/o Contrast w/ Doppler (22 @ 09:25) >  Summary:   1. Moderately decreased global left ventricular systolic function with   estimated EF of 25-30% with increased wall thickness.   2. The left ventricular diastolic function could not be assessed in this   study.   3. Mildly enlarged right ventricle (RVEDD = 4.3cm) with moderately   reduced function.   4. Diastolic septal wall flattening consistent with RV volume overload.   5. Mild biatrial enlargement.   6. Mitral valve with bileaflet tethering and resultant mild   regurgitation.   7. Severe tricuspid regurgitation with hepatic vein systolic flow   reversal.   8. Sclerotic aortic valve with normal opening and mild regurgitation.   9. Severe pulmonary hypertension. PASP is at least 65mmHg; this may be   underestimated due to severity of tricuspid regurgitation.  10. Moderate sized pericardial effusion localized posterior tothe right   atrium without echocardiographic evidence of tamponade physiology.  11. Compared to prior study, pericardial effusion size is stable;   findings are overall similar (pHTN severity was likely underestimated on   prior study 2/2 severe TR).    < end of copied text >    STRESS  FINDINGS:    CATHETERIZATION  FINDINGS:  21  FINDINGS  Left Heart Catheterization:  LVEDP: mild elevation   LEFT HEART CATHETERIZATION                                  Left main normal   LAD:       mild disease                Diag: patent  Left Circumflex: minor irregularities  OM: normal   Right Coronary Artery: Prox mild disease, Mid 70-95% diffuse lesion , Distal minor irregularities  RPDA normal   RPL normal   DOMINANCE: Right      ELECTROPHYSIOLOGY STUDY  FINDINGS:    CAROTID ULTRASOUND:  FINDINGS    VENOUS DUPLEX SCAN:  FINDINGS:    CHEST CT PULMONARY ANGIO with IV Contrast:  FINDINGS:      MEDICATIONS  (STANDING):  atorvastatin 80 milliGRAM(s) Oral at bedtime  budesonide 160 MICROgram(s)/formoterol 4.5 MICROgram(s) Inhaler 2 Puff(s) Inhalation two times a day  clopidogrel Tablet 75 milliGRAM(s) Oral daily  dextrose 5%. 1000 milliLiter(s) (100 mL/Hr) IV Continuous <Continuous>  dextrose 5%. 1000 milliLiter(s) (50 mL/Hr) IV Continuous <Continuous>  dextrose 50% Injectable 25 Gram(s) IV Push once  dextrose 50% Injectable 12.5 Gram(s) IV Push once  dextrose 50% Injectable 25 Gram(s) IV Push once  enoxaparin Injectable 40 milliGRAM(s) SubCutaneous every 24 hours  fenofibrate Tablet 145 milliGRAM(s) Oral daily  gabapentin 300 milliGRAM(s) Oral three times a day  glucagon  Injectable 1 milliGRAM(s) IntraMuscular once  insulin lispro (ADMELOG) corrective regimen sliding scale   SubCutaneous three times a day before meals  levothyroxine 25 MICROGram(s) Oral daily  metoprolol succinate  milliGRAM(s) Oral daily  pantoprazole    Tablet 40 milliGRAM(s) Oral before breakfast    MEDICATIONS  (PRN):  dextrose Oral Gel 15 Gram(s) Oral once PRN Blood Glucose LESS THAN 70 milliGRAM(s)/deciliter  oxyCODONE    IR 5 milliGRAM(s) Oral four times a day PRN Moderate Pain (4 - 6)      Vital Signs Last 24 Hrs  T(C): 36.6 (2023 05:00), Max: 36.8 (2023 14:51)  T(F): 97.8 (2023 05:00), Max: 98.2 (2023 14:51)  HR: 90 (2023 05:00) (82 - 90)  BP: 104/59 (2023 05:00) (95/58 - 118/58)  BP(mean): --  RR: 18 (2023 05:00) (18 - 18)  SpO2: 97% (2023 05:00) (97% - 97%)    Parameters below as of 2023 05:00  Patient On (Oxygen Delivery Method): nasal cannula  O2 Flow (L/min): 4      PHYSICAL EXAM:    GENERAL: In no apparent distress, well nourished, and hydrated.  HEAD:  Atraumatic, Normocephalic  EYES: EOMI, PERRLA, conjunctiva and sclera clear  NECK: Supple and normal thyroid.  No JVD or carotid bruit.  Carotid pulse is 2+ bilaterally.  HEART: Regular rate and rhythm; No murmurs, rubs, or gallops.  PULMONARY: Clear to auscultation and perfusion.  No rales, wheezing, or rhonchi bilaterally.  ABDOMEN: Soft, Nontender, Nondistended; Bowel sounds present  EXTREMITIES:  2+ Peripheral Pulses, No clubbing, cyanosis, or edema  NEUROLOGICAL: Grossly nonfocal    I&O's Detail    2023 07:01  -  2023 07:00  --------------------------------------------------------  IN:    IV PiggyBack: 100 mL    Oral Fluid: 790 mL  Total IN: 890 mL    OUT:    Voided (mL): 1225 mL  Total OUT: 1225 mL    Total NET: -335 mL        Daily     Daily     INTERPRETATION OF TELEMETRY:    EC Lead ECG:   Ventricular Rate 112 BPM    Atrial Rate 112 BPM    P-R Interval 172 ms    QRS Duration 134 ms    Q-T Interval 356 ms    QTC Calculation(Bazett) 485 ms    P Axis 54 degrees    R Axis 202 degrees    T Axis 51 degrees    Diagnosis Line Sinus tachycardia  Non-specific intra-ventricular conduction block  Lateral infarct , age undetermined  Abnormal ECG    Confirmed by Asad Buitrago ([id number]) on 2023 8:04:59 AM (23 @ 21:36)        LABS:                        9.8    5.24  )-----------( 176      ( 2023 07:18 )             31.5         136  |  105  |  17  ----------------------------<  122<H>  5.0   |  22  |  1.6<H>    Ca    9.0      2023 07:18  Mg     2.3         TPro  6.7  /  Alb  3.4<L>  /  TBili  1.0  /  DBili  x   /  AST  15  /  ALT  <5  /  AlkPhos  72              BNPSerum Pro-Brain Natriuretic Peptide: 8880 pg/mL ( @ 04:30)        Culture - Blood (collected 23 @ 21:11)  Source: .Blood Blood-Peripheral  Preliminary Report (23 @ 07:01):    No growth to date.    Culture - Blood (collected 23 @ 21:11)  Source: .Blood Blood-Peripheral  Gram Stain (23 @ 06:08):    Growth in aerobic bottle: Gram positive cocci in pairs  Final Report (23 @ 22:23):    Growth in aerobic bottle: Staphylococcus haemolyticus    Coag Negative Staphylococcus    Single set isolate, possible contaminant. Contact    Microbiology if susceptibility testing clinically    indicated.    ***Blood Panel PCR results on this specimen are available    approximately 3 hours after the Gram stain result.***    Gram stain, PCR, and/or culture results may not always    correspond due to difference in methodologies.    ************************************************************    This PCR assay was performed by multiplex PCR. This    Assay tests for 66 bacterial and resistance gene targets.    Please refer to the [facility] test directory    at [email or web address] for details.  Organism: Blood Culture PCR (23 @ 22:23)  Organism: Blood Culture PCR (23 @ 22:23)      -  Coagulase negative Staphylococcus: Detec      Method Type: PCR            RADIOLOGY & ADDITIONAL STUDIES:

## 2023-01-21 NOTE — PROGRESS NOTE ADULT - SUBJECTIVE AND OBJECTIVE BOX
WILLIAN MARY  57y  [facility] T5-3C 012 B      Patient is a 57y old  Female who presents with a chief complaint of     INTERVAL HPI/OVERNIGHT EVENTS:  Patient complaining of severe leg pain due to swelling  she is on 4 liter oxygen which is her baseline   no other complains noted             FAMILY HISTORY:  FH: diabetes mellitus (Father, Mother)      T(C): 37.3 (01-20-23 @ 04:48), Max: 37.3 (01-20-23 @ 04:48)  HR: 78 (01-20-23 @ 05:46) (78 - 99)  BP: 99/57 (01-20-23 @ 05:46) (92/50 - 99/65)  RR: 18 (01-20-23 @ 05:46) (18 - 20)  SpO2: 96% (01-20-23 @ 05:46) (96% - 98%)  Wt(kg): --Vital Signs Last 24 Hrs  T(C): 37.3 (20 Jan 2023 04:48), Max: 37.3 (20 Jan 2023 04:48)  T(F): 99.1 (20 Jan 2023 04:48), Max: 99.1 (20 Jan 2023 04:48)  HR: 78 (20 Jan 2023 05:46) (78 - 99)  BP: 99/57 (20 Jan 2023 05:46) (92/50 - 99/65)  BP(mean): 72 (20 Jan 2023 00:15) (72 - 72)  RR: 18 (20 Jan 2023 05:46) (18 - 20)  SpO2: 96% (20 Jan 2023 05:46) (96% - 98%)    Parameters below as of 20 Jan 2023 05:46  Patient On (Oxygen Delivery Method): nasal cannula  O2 Flow (L/min): 4      PHYSICAL EXAM:  GENERAL: NAD, well-groomed, well-developed  NERVOUS SYSTEM:  Alert & Oriented X3, Good concentration;  PULM: Clear to auscultation bilaterally  CARDIAC: Regular rate and rhythm;  GI: Soft, Nontender, Nondistended; Bowel sounds present  EXTREMITIES: significant lower leg edema noted     Consultant(s) Notes Reviewed:  [x ] YES  [ ] NO  Care Discussed with Consultants/Other Providers [ x] YES  [ ] NO    LABS:                            10.0   4.67  )-----------( 190      ( 20 Jan 2023 08:46 )             32.9   01-20    134<L>  |  105  |  14  ----------------------------<  66<L>  5.6<H>   |  21  |  1.4    Ca    8.9      20 Jan 2023 08:46  Mg     1.6     01-20    TPro  6.8  /  Alb  3.3<L>  /  TBili  1.5<H>  /  DBili  x   /  AST  16  /  ALT  <5  /  AlkPhos  75  01-20            Culture - Blood (collected 18 Jan 2023 21:11)  Source: .Blood Blood-Peripheral  Preliminary Report (20 Jan 2023 07:01):    No growth to date.    Culture - Blood (collected 18 Jan 2023 21:11)  Source: .Blood Blood-Peripheral  Gram Stain (20 Jan 2023 06:08):    Growth in aerobic bottle: Gram positive cocci in pairs  Preliminary Report (20 Jan 2023 06:09):    Growth in aerobic bottle: Gram positive cocci in pairs    ***Blood Panel PCR results on this specimen are available    approximately 3 hours after the Gram stain result.***    Gram stain, PCR, and/or culture results may not always    correspond due to difference in methodologies.    ************************************************************    This PCR assay was performed by multiplex PCR. This    Assay tests for 66 bacterial and resistance gene targets.    Please refer to the [facility] test directory    at [email or web address] for details.  Organism: Blood Culture PCR (20 Jan 2023 07:09)  Organism: Blood Culture PCR (20 Jan 2023 07:09)      budesonide 160 MICROgram(s)/formoterol 4.5 MICROgram(s) Inhaler 2 Puff(s) Inhalation two times a day  clopidogrel Tablet 75 milliGRAM(s) Oral daily  dextrose 5%. 1000 milliLiter(s) IV Continuous <Continuous>  dextrose 5%. 1000 milliLiter(s) IV Continuous <Continuous>  dextrose 50% Injectable 25 Gram(s) IV Push once  dextrose 50% Injectable 12.5 Gram(s) IV Push once  dextrose 50% Injectable 25 Gram(s) IV Push once  dextrose Oral Gel 15 Gram(s) Oral once PRN  enoxaparin Injectable 40 milliGRAM(s) SubCutaneous every 24 hours  fenofibrate Tablet 145 milliGRAM(s) Oral daily  furosemide   Injectable 40 milliGRAM(s) IV Push once  gabapentin 300 milliGRAM(s) Oral three times a day  glucagon  Injectable 1 milliGRAM(s) IntraMuscular once  insulin lispro (ADMELOG) corrective regimen sliding scale   SubCutaneous three times a day before meals  levothyroxine 25 MICROGram(s) Oral daily  metolazone 2.5 milliGRAM(s) Oral once  metoprolol succinate  milliGRAM(s) Oral daily  oxyCODONE    IR 5 milliGRAM(s) Oral every 8 hours PRN  pantoprazole    Tablet 40 milliGRAM(s) Oral before breakfast    57 year old female with PMH of COPD on 4 L home O2, CORNELIUS on CPAP, HFrEF (19% on TTE 02/2022, s/p ICD, pulmonary HTN, CAD s/p PCI to RCA)1, HTN, HLD, CVA with residual LLE weakness, DM, active smoker on home O2, presented to the ED for foot pain.   Patient does not know any of her meds or doses; verify with [employer] in the AM. Meds started using surescripts.       1. SOB and severe lower extremity edema due to acute systolic CHF   - Admit to medicine         - CXR:CHF. BNP elevated   - COnt lasix 40mg IV bid.   * pt was on torsemide 20mg po daily   - Stop spironolactone for now due to K>5.5   - Stop abs no signs of cellulitis  - Blood cx : coag negative staph Likely contamination. no need for abs   - Cont entresto   - Cont metoprolol   * Last echo showed EF:19%   - EP consult:pending     2. hx of chronic hypoxic resp failure due to COPD   - Cont inhalers  - not  in acute exacerbation     3. CORNELIUS  - COnt CPAP       4. CAD s/p PCI / HTN   - c/w plavix   - c/w entresto   - c/w metoprolol 100mg ER     5. DM-II complicated  - hold home farxiga and metformin   - fingersticks and insulin PRN     6. Chronic normocytic anemia   - Hb 10.3; can f/u Fe studies     7. D LD   - verify if patient on statin ([facility] pharmacy)     8. Hx of CVA  - c/w plavix   - verify if patient on statin ([facility] pharmacy)     DVT ppx: Lovenox   GI ppx: protonix   Diet: dash   Dispo: acute                 Case Discussed with House Staff   39  minutes spent on total encounter; more than 50% of the visit was spent counseling and/or coordinating care by the attending physician.   Spectra [phone]     WILLIAN MARY  57y  [facility] T5-3C 012 B      Patient is a 57y old  Female who presents with a chief complaint of     INTERVAL HPI/OVERNIGHT EVENTS:  Patient feels better. Still with edema in the legs. no overnight events.  bp was soft  still on oxygen which is her baseline. use 4 liter at home            FAMILY HISTORY:  FH: diabetes mellitus (Father, Mother)      T(C): 37.3 (01-20-23 @ 04:48), Max: 37.3 (01-20-23 @ 04:48)  HR: 78 (01-20-23 @ 05:46) (78 - 99)  BP: 99/57 (01-20-23 @ 05:46) (92/50 - 99/65)  RR: 18 (01-20-23 @ 05:46) (18 - 20)  SpO2: 96% (01-20-23 @ 05:46) (96% - 98%)  Wt(kg): --Vital Signs Last 24 Hrs  T(C): 37.3 (20 Jan 2023 04:48), Max: 37.3 (20 Jan 2023 04:48)  T(F): 99.1 (20 Jan 2023 04:48), Max: 99.1 (20 Jan 2023 04:48)  HR: 78 (20 Jan 2023 05:46) (78 - 99)  BP: 99/57 (20 Jan 2023 05:46) (92/50 - 99/65)  BP(mean): 72 (20 Jan 2023 00:15) (72 - 72)  RR: 18 (20 Jan 2023 05:46) (18 - 20)  SpO2: 96% (20 Jan 2023 05:46) (96% - 98%)    Parameters below as of 20 Jan 2023 05:46  Patient On (Oxygen Delivery Method): nasal cannula  O2 Flow (L/min): 4      PHYSICAL EXAM:  GENERAL: NAD, well-groomed, well-developed  NERVOUS SYSTEM:  Alert & Oriented X3, Good concentration;  PULM: Clear to auscultation bilaterally  CARDIAC: Regular rate and rhythm;  GI: Soft, Nontender, Nondistended; Bowel sounds present  EXTREMITIES: lower leg edema     Consultant(s) Notes Reviewed:  [x ] YES  [ ] NO  Care Discussed with Consultants/Other Providers [ x] YES  [ ] NO    LABS:                            10.0   4.67  )-----------( 190      ( 20 Jan 2023 08:46 )             32.9   01-20    134<L>  |  105  |  14  ----------------------------<  66<L>  5.6<H>   |  21  |  1.4    Ca    8.9      20 Jan 2023 08:46  Mg     1.6     01-20    TPro  6.8  /  Alb  3.3<L>  /  TBili  1.5<H>  /  DBili  x   /  AST  16  /  ALT  <5  /  AlkPhos  75  01-20            Culture - Blood (collected 18 Jan 2023 21:11)  Source: .Blood Blood-Peripheral  Preliminary Report (20 Jan 2023 07:01):    No growth to date.    Culture - Blood (collected 18 Jan 2023 21:11)  Source: .Blood Blood-Peripheral  Gram Stain (20 Jan 2023 06:08):    Growth in aerobic bottle: Gram positive cocci in pairs  Preliminary Report (20 Jan 2023 06:09):    Growth in aerobic bottle: Gram positive cocci in pairs    ***Blood Panel PCR results on this specimen are available    approximately 3 hours after the Gram stain result.***    Gram stain, PCR, and/or culture results may not always    correspond due to difference in methodologies.    ************************************************************    This PCR assay was performed by multiplex PCR. This    Assay tests for 66 bacterial and resistance gene targets.    Please refer to the [facility] test directory    at [email or web address] for details.  Organism: Blood Culture PCR (20 Jan 2023 07:09)  Organism: Blood Culture PCR (20 Jan 2023 07:09)      budesonide 160 MICROgram(s)/formoterol 4.5 MICROgram(s) Inhaler 2 Puff(s) Inhalation two times a day  clopidogrel Tablet 75 milliGRAM(s) Oral daily  dextrose 5%. 1000 milliLiter(s) IV Continuous <Continuous>  dextrose 5%. 1000 milliLiter(s) IV Continuous <Continuous>  dextrose 50% Injectable 25 Gram(s) IV Push once  dextrose 50% Injectable 12.5 Gram(s) IV Push once  dextrose 50% Injectable 25 Gram(s) IV Push once  dextrose Oral Gel 15 Gram(s) Oral once PRN  enoxaparin Injectable 40 milliGRAM(s) SubCutaneous every 24 hours  fenofibrate Tablet 145 milliGRAM(s) Oral daily  furosemide   Injectable 40 milliGRAM(s) IV Push once  gabapentin 300 milliGRAM(s) Oral three times a day  glucagon  Injectable 1 milliGRAM(s) IntraMuscular once  insulin lispro (ADMELOG) corrective regimen sliding scale   SubCutaneous three times a day before meals  levothyroxine 25 MICROGram(s) Oral daily  metolazone 2.5 milliGRAM(s) Oral once  metoprolol succinate  milliGRAM(s) Oral daily  oxyCODONE    IR 5 milliGRAM(s) Oral every 8 hours PRN  pantoprazole    Tablet 40 milliGRAM(s) Oral before breakfast    57 year old female with PMH of COPD on 4 L home O2, CORNELIUS on CPAP, HFrEF (19% on TTE 02/2022, s/p ICD, pulmonary HTN, CAD s/p PCI to RCA)1, HTN, HLD, CVA with residual LLE weakness, DM, active smoker on home O2, presented to the ED for foot pain.   Patient does not know any of her meds or doses; verify with [employer] in the AM. Meds started using surescripts.       1. SOB associated with  severe lower extremity edema due to acute systolic CHF   - Admit to medicine         - CXR:CHF. BNP elevated. last CXR:Pending    - Was on lasix 40mg IV bid that I hold due to worsening kidney function   * pt was on torsemide 20mg po daily   - Stop spironolactone for now due to K>5.5   - Stop abs no signs of cellulitis  - Blood cx :Staph haemolyticus in one bottle Likely contamination.    - Cont entresto   - Cont metoprolol   * Last echo showed EF:19%   - EP consult appreciated  * pt was given 2 life vest that she lost. Plan for ICD   - Arterial duplex:  a) No flow is visualized in distal right SFA and proximal and mid left SFA.  b) Reconstitution in the popliteal artery bilaterally with diminished flow in tibial arteries.  - Cardiovascular consult:pending       2. hx of chronic hypoxic resp failure on 4 liter oxygen  due to COPD   - Cont inhalers  - not  in acute exacerbation     3. CORNELIUS  - COnt CPAP       4. CAD s/p PCI / HTN   - c/w plavix   - c/w entresto   - c/w metoprolol 100mg ER     5. DM-II complicated  - hold home farxiga and metformin   - fingersticks and insulin PRN     6. Chronic normocytic anemia   - Hb 10.3; can f/u Fe studies     7. D LD   - Cont statin     8. Hx of CVA  - c/w plavix   - Cont statin     9. Mild Acute kidney injury likely due to lasix   - Hold lasix for now     DVT ppx: Lovenox   GI ppx: protonix   Diet: dash   Dispo: acute       Cardiovascular consult for PAD evaluation.   Hold lasix. CXR:pending.           Case Discussed with House Staff   39  minutes spent on total encounter; more than 50% of the visit was spent counseling and/or coordinating care by the attending physician.   Spectra [phone]

## 2023-01-21 NOTE — CONSULT NOTE ADULT - SUBJECTIVE AND OBJECTIVE BOX
HPI:  57 year old female with PMH of COPD on 4 L home O2, CORNELIUS on CPAP, HFrEF (19% on TTE 02/2022, s/p ICD, pulmonary HTN, CAD s/p PCI to RCA)1, HTN, HLD, CVA with residual LLE weakness, DM, active smoker on home O2, presented to the ED for foot pain.     She reports that pain started in bilateral legs 3 days ago (L>R). She also noticed increased swelling in both legs. Pain was so intense, she could no longer touch her feet. Symptoms are worse on her L leg. She also reports that her chronic shortness of breath has been worse than usual. She also endorses that she generally feels more swollen than usual.     ED Course:   Vitals: T 98, , /62, RR 18, 99% on 4L NC  Labs: unremarkable   Imaging: none performed    Patient admitted for bilateral LE edema, CHF Exacerbation vs. Cellulitis (less likely)     Patient does not know any of her meds or doses; verify with [employer] in the AM. Meds started using surescripts.        (19 Jan 2023 03:19)      PAST MEDICAL & SURGICAL HISTORY  Hypertension    Hyperlipidemia    Anxiety and depression    COPD, severe    CHF (congestive heart failure)    Cerebrovascular accident (CVA)  Multiple    Type 2 diabetes mellitus    CKD (chronic kidney disease), stage II    No significant past surgical history        FAMILY HISTORY:  FAMILY HISTORY:  FH: diabetes mellitus (Father, Mother)        SOCIAL HISTORY:  [X]smoker  []Alcohol  []Drug    ALLERGIES:  No Known Allergies      MEDICATIONS:  MEDICATIONS  (STANDING):  atorvastatin 80 milliGRAM(s) Oral at bedtime  budesonide 160 MICROgram(s)/formoterol 4.5 MICROgram(s) Inhaler 2 Puff(s) Inhalation two times a day  clopidogrel Tablet 75 milliGRAM(s) Oral daily  dextrose 5%. 1000 milliLiter(s) (100 mL/Hr) IV Continuous <Continuous>  dextrose 5%. 1000 milliLiter(s) (50 mL/Hr) IV Continuous <Continuous>  dextrose 50% Injectable 25 Gram(s) IV Push once  dextrose 50% Injectable 12.5 Gram(s) IV Push once  dextrose 50% Injectable 25 Gram(s) IV Push once  enoxaparin Injectable 40 milliGRAM(s) SubCutaneous every 24 hours  fenofibrate Tablet 145 milliGRAM(s) Oral daily  gabapentin 300 milliGRAM(s) Oral three times a day  glucagon  Injectable 1 milliGRAM(s) IntraMuscular once  insulin lispro (ADMELOG) corrective regimen sliding scale   SubCutaneous three times a day before meals  levothyroxine 25 MICROGram(s) Oral daily  metoprolol succinate  milliGRAM(s) Oral daily  pantoprazole    Tablet 40 milliGRAM(s) Oral before breakfast    MEDICATIONS  (PRN):  dextrose Oral Gel 15 Gram(s) Oral once PRN Blood Glucose LESS THAN 70 milliGRAM(s)/deciliter  oxyCODONE    IR 5 milliGRAM(s) Oral four times a day PRN Moderate Pain (4 - 6)      HOME MEDICATIONS:  Home Medications:  Farxiga 10 mg oral tablet: 1 tab(s) orally once a day (20 Jan 2023 11:30)  fenofibrate 145 mg oral tablet: 1 tab(s) orally once a day (20 Jan 2023 11:30)  magnesium gluconate 500 mg oral tablet: 1 tab(s) orally 2 times a day (20 Jan 2023 11:30)  metoprolol succinate 100 mg oral tablet, extended release: 1 tab(s) orally once a day (20 Jan 2023 11:30)  pantoprazole 40 mg oral delayed release tablet: 1 tab(s) orally once a day (before a meal) (20 Jan 2023 11:30)  Synthroid 25 mcg (0.025 mg) oral tablet: 1 tab(s) orally once a day (20 Jan 2023 11:30)      VITALS:   T(F): 97.8 (01-21 @ 05:00), Max: 99.1 (01-20 @ 04:48)  HR: 90 (01-21 @ 05:00) (78 - 120)  BP: 104/59 (01-21 @ 05:00) (92/50 - 125/62)  BP(mean): 72 (01-20 @ 00:15) (72 - 73)  RR: 18 (01-21 @ 05:00) (16 - 20)  SpO2: 97% (01-21 @ 05:00) (95% - 99%)    I&O's Summary    20 Jan 2023 07:01  -  21 Jan 2023 07:00  --------------------------------------------------------  IN: 890 mL / OUT: 1225 mL / NET: -335 mL        REVIEW OF SYSTEMS:  CONSTITUTIONAL: No weakness, fevers or chills  EYES: No visual changes  ENT: No vertigo or throat pain   NECK: No pain or stiffness  RESPIRATORY: Shortness of breath  CARDIOVASCULAR: Shortness of breath; bilateral LE edema; LE pain (L>R)  GASTROINTESTINAL: No abdominal or epigastric pain. No nausea, vomiting, or hematemesis; No diarrhea or constipation. No melena or hematochezia.  GENITOURINARY: No dysuria, frequency or hematuria  NEUROLOGICAL: No numbness or weakness  SKIN: LE erythema  MSK: No pain except for LE pain    PHYSICAL EXAM:  NEURO: patient is awake , alert and oriented  GEN: Not in acute distress  NECK: no thyroid enlargement, no JVD  LUNGS: Clear to auscultation bilaterally; distal pulses non palpable  CARDIOVASCULAR: Bilateral crackles  ABD: Soft, non-tender  EXT: Bilateral LE 2+ edema  SKIN: Diffuse B/L LE erythema with shiny skin    LABS:                        9.8    5.24  )-----------( 176      ( 21 Jan 2023 07:18 )             31.5     01-21    136  |  105  |  17  ----------------------------<  122<H>  5.0   |  22  |  1.6<H>    Ca    9.0      21 Jan 2023 07:18  Mg     2.3     01-21    TPro  6.7  /  Alb  3.4<L>  /  TBili  1.0  /  DBili  x   /  AST  15  /  ALT  <5  /  AlkPhos  72  01-21              Troponin trend:      01-19 Chol 77 LDL -- HDL 47<L> Trig 37      RADIOLOGY:  -CXR:  < from: Xray Chest 1 View- PORTABLE-Urgent (01.18.23 @ 22:28) >  IMPRESSION:  1.  Decreased bilateral parenchymal opacities.  2.  Persistently enlarged cardiomediastinal silhouette with mild   cephalization.    < end of copied text >    -TTE:  < from: TTE Echo Complete w/o Contrast w/ Doppler (12.02.22 @ 09:25) >  Summary:   1. Moderately decreased global left ventricular systolic function with   estimated EF of 25-30% with increased wall thickness.   2. The left ventricular diastolic function could not be assessed in this   study.   3. Mildly enlarged right ventricle (RVEDD = 4.3cm) with moderately   reduced function.   4. Diastolic septal wall flattening consistent with RV volume overload.   5. Mild biatrial enlargement.   6. Mitral valve with bileaflet tethering and resultant mild   regurgitation.   7. Severe tricuspid regurgitation with hepatic vein systolic flow   reversal.   8. Sclerotic aortic valve with normal opening and mild regurgitation.   9. Severe pulmonary hypertension. PASP is at least 65mmHg; this may be   underestimated due to severity of tricuspid regurgitation.  10. Moderate sized pericardial effusion localized posterior tothe right   atrium without echocardiographic evidence of tamponade physiology.  11. Compared to prior study, pericardial effusion size is stable;   findings are overall similar (pHTN severity was likely underestimated on   prior study 2/2 severe TR).    < end of copied text >    -CATHETERIZATION:  < from: Cardiac Cath Lab - Adult (06.21.21 @ 11:51) >  CORONARY CIRCULATION: The coronary circulation is right dominant. There was    severe 1-vessel coronary artery disease (RCA). Left main: Normal. LAD:    Angiography showed mild atherosclerosis with no flow limiting lesions. 1st    diagonal: Angiography showed mild atherosclerosis with no flow limiting    lesions. Circumflex: Angiography showed minor luminal irregularities with    no flow limiting lesions. 1st obtuse marginal: Normal. Proximal RCA:    Angiography showed mild atherosclerosisMid RCA: There was a diffuse 90 %    stenosis at a site with no prior intervention. This is a likely culprit    for the patient's clinical presentation. An intervention was performed.    Distal RCA: Angiography showed minor luminal irregularities with no flow    limiting lesions. Right PDA: Normal.    < end of copied text >      LE arterial duplex:  < from: VA Duplex Lower Extrem Arterial, Bilat (01.19.23 @ 16:57) >  IMPRESSION:  No flow is visualized in distal right SFA and proximal and mid left SFA.  Reconstitution in the popliteal artery bilaterally with diminished flow   in tibial arteries.    < end of copied text >

## 2023-01-21 NOTE — CONSULT NOTE ADULT - ASSESSMENT
57y Female with h/o COPD on 3L home O2, CORNELIUS on CPAP, CVA with residual LLE weakness, DM, HTN, HLD, CAD, s/p PCI, pulmonary HTN, HFrEF, s/p BiV ICD [employer] 5/25/22, admitted with ICD site infection   s/p extraction 11/23   57y Female with h/o COPD on 3L home O2, CORNELIUS on CPAP, CVA with residual LLE weakness, DM, HTN, HLD, CAD, s/p PCI, pulmonary HTN, HFrEF, s/p BiV ICD [employer] 5/25/22, ICD site infection   s/p extraction 11/23, s/p Abx course, planned for re-implantation 2/13 as out patient     con't current management  positive blood culture ? contamination  repeat cultures pending  ID clearance  plan for ICD placement 2/13 as out-pt.  recall EP prn

## 2023-01-21 NOTE — PROGRESS NOTE ADULT - SUBJECTIVE AND OBJECTIVE BOX
WILLIAN MARY 57y Female  MRN#: [id number]   Hospital Day: 2d    HPI:  57 year old female with PMH of COPD on 4 L home O2, CORNELIUS on CPAP, HFrEF (19% on TTE 02/2022, s/p ICD, pulmonary HTN, CAD s/p PCI to RCA)1, HTN, HLD, CVA with residual LLE weakness, DM, active smoker on home O2, presented to the ED for foot pain.     She reports that pain started in bilateral legs two days ago. She also noticed increased swelling in both legs. Pain was so intense, she could no longer ambulate or touch her feet. Symptoms are worse on her L leg. She also reports that her chronic shortness of breath has been worse than usual. She also endorses that she generally feels more swollen than usual.       ED Course:   Vitals: T 98, , /62, RR 18, 99% on 4L NC  Labs: unremarkable   Imaging: none performed    Patient admitted for bilateral LE edema, CHF Exacerbation vs. Cellulitis (less likely)     Patient does not know any of her meds or doses; verify with [facility] in the AM. Meds started using surescripts.        (19 Jan 2023 03:19)      SUBJECTIVE  Patient is a 57y old Female who presents with a chief complaint of Currently admitted to medicine with the primary diagnosis of Bilateral foot pain      INTERVAL HPI AND OVERNIGHT EVENTS:  Patient was examined and seen at bedside. This morning she is resting comfortably in bed. She still has the bilateral LE pain     OBJECTIVE  PAST MEDICAL & SURGICAL HISTORY  Hypertension    Hyperlipidemia    Anxiety and depression    COPD, severe    CHF (congestive heart failure)    Cerebrovascular accident (CVA)  Multiple    Type 2 diabetes mellitus    CKD (chronic kidney disease), stage II    No significant past surgical history      ALLERGIES:  No Known Allergies    MEDICATIONS:  STANDING MEDICATIONS  budesonide 160 MICROgram(s)/formoterol 4.5 MICROgram(s) Inhaler 2 Puff(s) Inhalation two times a day  clopidogrel Tablet 75 milliGRAM(s) Oral daily  dextrose 5%. 1000 milliLiter(s) IV Continuous <Continuous>  dextrose 5%. 1000 milliLiter(s) IV Continuous <Continuous>  dextrose 50% Injectable 25 Gram(s) IV Push once  dextrose 50% Injectable 12.5 Gram(s) IV Push once  dextrose 50% Injectable 25 Gram(s) IV Push once  enoxaparin Injectable 40 milliGRAM(s) SubCutaneous every 24 hours  fenofibrate Tablet 145 milliGRAM(s) Oral daily  furosemide   Injectable 40 milliGRAM(s) IV Push every 12 hours  gabapentin 300 milliGRAM(s) Oral three times a day  glucagon  Injectable 1 milliGRAM(s) IntraMuscular once  insulin lispro (ADMELOG) corrective regimen sliding scale   SubCutaneous three times a day before meals  levothyroxine 25 MICROGram(s) Oral daily  metoprolol succinate  milliGRAM(s) Oral daily  pantoprazole    Tablet 40 milliGRAM(s) Oral before breakfast    PRN MEDICATIONS  dextrose Oral Gel 15 Gram(s) Oral once PRN  oxyCODONE    IR 5 milliGRAM(s) Oral four times a day PRN      VITAL SIGNS: Last 24 Hours  T(C): 36.6 (21 Jan 2023 05:00), Max: 36.8 (20 Jan 2023 14:51)  T(F): 97.8 (21 Jan 2023 05:00), Max: 98.2 (20 Jan 2023 14:51)  HR: 90 (21 Jan 2023 05:00) (82 - 90)  BP: 104/59 (21 Jan 2023 05:00) (95/58 - 118/58)  BP(mean): --  RR: 18 (21 Jan 2023 05:00) (18 - 18)  SpO2: 97% (21 Jan 2023 05:00) (97% - 97%)    LABS:                        9.8    5.24  )-----------( 176      ( 21 Jan 2023 07:18 )             31.5     01-21    136  |  105  |  17  ----------------------------<  122<H>  5.0   |  22  |  1.6<H>    Ca    9.0      21 Jan 2023 07:18  Mg     2.3     01-21    TPro  6.7  /  Alb  3.4<L>  /  TBili  1.0  /  DBili  x   /  AST  15  /  ALT  <5  /  AlkPhos  72  01-21      Culture - Blood (collected 18 Jan 2023 21:11)  Source: .Blood Blood-Peripheral  Preliminary Report (20 Jan 2023 07:01):    No growth to date.    Culture - Blood (collected 18 Jan 2023 21:11)  Source: .Blood Blood-Peripheral  Gram Stain (20 Jan 2023 06:08):    Growth in aerobic bottle: Gram positive cocci in pairs  Final Report (20 Jan 2023 22:23):    Growth in aerobic bottle: Staphylococcus haemolyticus    Coag Negative Staphylococcus    Single set isolate, possible contaminant. Contact    Microbiology if susceptibility testing clinically    indicated.    ***Blood Panel PCR results on this specimen are available    approximately 3 hours after the Gram stain result.***    Gram stain, PCR, and/or culture results may not always    correspond due to difference in methodologies.    ************************************************************    This PCR assay was performed by multiplex PCR. This    Assay tests for 66 bacterial and resistance gene targets.    Please refer to the [facility] Labs test directory    at [email or web address] for details.  Organism: Blood Culture PCR (20 Jan 2023 22:23)  Organism: Blood Culture PCR (20 Jan 2023 22:23)        PHYSICAL EXAM:  CONSTITUTIONAL: No acute distress, AAOx3  HEAD: Atraumatic, normocephalic  EYES: EOM intact, PERRLA, conjunctiva and sclera clear  ENT: Supple, no masses, no thyromegaly, no bruits, no JVD; moist mucous membranes  PULMONARY: Clear to auscultation bilaterally; no wheezes, rales, or rhonchi  CARDIOVASCULAR: Regular rate and rhythm; no murmurs, rubs, or gallops  GASTROINTESTINAL: Soft, non-tender, non-distended; bowel sounds present  MUSCULOSKELETAL: 2+ peripheral pulses, +3 bilateral LLE reaching knees, erythema of bilateral feet   NEUROLOGY: can't move left leg bcz of heaviness and pain     ASSESSMENT & PLAN  57 year old female with PMH of COPD on 4 L home O2, CORNELIUS on CPAP, HFrEF (19% on TTE 02/2022, s/p ICD, pulmonary HTN, CAD s/p PCI to RCA)1, HTN, HLD, CVA with residual LLE weakness, DM, active smoker on home O2, presented to the ED for foot pain.     # bilateral LE edema, CHF Exacerbation vs. Cellulitis (less likely)   # Chronic Hypoxic Respiratory Failure- slightly worse   # HFrEF   - No out of proportion tenderness, open wound, or purulence on exam   - subjective dyspnea on home 4L NC   - s/p rocephin and flagyl in ED, but changes consistent with venous stasis, not cellulits; no leukocytosis   - BNP 8880, last TTE EF 25-30%   - CXR showed cardiomegaly and cephalization   - bilateral LE duplex negative   - c/w entresto   - f/u cultures and procal   - started on lasix 40 mg IV BID   - cefepime stopped ( does not look like cellulitis )  - blood cx +ve for coagulase negative gram + cocci, f/u repeat ( probably contaminated )  - arterial duplex: no flow in distal R SFA, proximal and mid left SFA, decreased flow in tibia arteries   - f/u EP consult for life vest or AICD re-implantation ( AICD removed previously bcz of bacteremia )   Discussed with EP, pt had life vest 2x before and was non compliant and lost the vests. For new AICD they need ID clearance before given +ve blood cx     # COPD - on home 4L O2  # CORNELIUS - on CPAP at home   - c/w symbicort     # CAD s/p PCI   # HTN   - c/w plavix   - c/w entresto   - c/w metoprolol 100mg ER     # DM   - hold home farxiga and metformin   - fingersticks and insulin PRN     # Chronic normocytic anemia   - Hb 10.3; can f/u Fe studies      # DLD     # Hx of CVA  - c/w plavix     DVT ppx: Lovenox   GI ppx: protonix   Diet: dash   Dispo: acute          WILLIAN MARY 57y Female  MRN#: [id number]   Hospital Day: 2d    HPI:  57 year old female with PMH of COPD on 4 L home O2, CORNELIUS on CPAP, HFrEF (19% on TTE 02/2022, s/p ICD, pulmonary HTN, CAD s/p PCI to RCA)1, HTN, HLD, CVA with residual LLE weakness, DM, active smoker on home O2, presented to the ED for foot pain.     She reports that pain started in bilateral legs two days ago. She also noticed increased swelling in both legs. Pain was so intense, she could no longer ambulate or touch her feet. Symptoms are worse on her L leg. She also reports that her chronic shortness of breath has been worse than usual. She also endorses that she generally feels more swollen than usual.       ED Course:   Vitals: T 98, , /62, RR 18, 99% on 4L NC  Labs: unremarkable   Imaging: none performed    Patient admitted for bilateral LE edema, CHF Exacerbation vs. Cellulitis (less likely)     Patient does not know any of her meds or doses; verify with [employer] in the AM. Meds started using surescripts.        (19 Jan 2023 03:19)      SUBJECTIVE  Patient is a 57y old Female who presents with a chief complaint of Currently admitted to medicine with the primary diagnosis of Bilateral foot pain      INTERVAL HPI AND OVERNIGHT EVENTS:  Patient was examined and seen at bedside. This morning she is resting comfortably in bed. She still has the bilateral LE pain     OBJECTIVE  PAST MEDICAL & SURGICAL HISTORY  Hypertension    Hyperlipidemia    Anxiety and depression    COPD, severe    CHF (congestive heart failure)    Cerebrovascular accident (CVA)  Multiple    Type 2 diabetes mellitus    CKD (chronic kidney disease), stage II    No significant past surgical history      ALLERGIES:  No Known Allergies    MEDICATIONS:  STANDING MEDICATIONS  budesonide 160 MICROgram(s)/formoterol 4.5 MICROgram(s) Inhaler 2 Puff(s) Inhalation two times a day  clopidogrel Tablet 75 milliGRAM(s) Oral daily  dextrose 5%. 1000 milliLiter(s) IV Continuous <Continuous>  dextrose 5%. 1000 milliLiter(s) IV Continuous <Continuous>  dextrose 50% Injectable 25 Gram(s) IV Push once  dextrose 50% Injectable 12.5 Gram(s) IV Push once  dextrose 50% Injectable 25 Gram(s) IV Push once  enoxaparin Injectable 40 milliGRAM(s) SubCutaneous every 24 hours  fenofibrate Tablet 145 milliGRAM(s) Oral daily  furosemide   Injectable 40 milliGRAM(s) IV Push every 12 hours  gabapentin 300 milliGRAM(s) Oral three times a day  glucagon  Injectable 1 milliGRAM(s) IntraMuscular once  insulin lispro (ADMELOG) corrective regimen sliding scale   SubCutaneous three times a day before meals  levothyroxine 25 MICROGram(s) Oral daily  metoprolol succinate  milliGRAM(s) Oral daily  pantoprazole    Tablet 40 milliGRAM(s) Oral before breakfast    PRN MEDICATIONS  dextrose Oral Gel 15 Gram(s) Oral once PRN  oxyCODONE    IR 5 milliGRAM(s) Oral four times a day PRN      VITAL SIGNS: Last 24 Hours  T(C): 36.6 (21 Jan 2023 05:00), Max: 36.8 (20 Jan 2023 14:51)  T(F): 97.8 (21 Jan 2023 05:00), Max: 98.2 (20 Jan 2023 14:51)  HR: 90 (21 Jan 2023 05:00) (82 - 90)  BP: 104/59 (21 Jan 2023 05:00) (95/58 - 118/58)  BP(mean): --  RR: 18 (21 Jan 2023 05:00) (18 - 18)  SpO2: 97% (21 Jan 2023 05:00) (97% - 97%)    LABS:                        9.8    5.24  )-----------( 176      ( 21 Jan 2023 07:18 )             31.5     01-21    136  |  105  |  17  ----------------------------<  122<H>  5.0   |  22  |  1.6<H>    Ca    9.0      21 Jan 2023 07:18  Mg     2.3     01-21    TPro  6.7  /  Alb  3.4<L>  /  TBili  1.0  /  DBili  x   /  AST  15  /  ALT  <5  /  AlkPhos  72  01-21      Culture - Blood (collected 18 Jan 2023 21:11)  Source: .Blood Blood-Peripheral  Preliminary Report (20 Jan 2023 07:01):    No growth to date.    Culture - Blood (collected 18 Jan 2023 21:11)  Source: .Blood Blood-Peripheral  Gram Stain (20 Jan 2023 06:08):    Growth in aerobic bottle: Gram positive cocci in pairs  Final Report (20 Jan 2023 22:23):    Growth in aerobic bottle: Staphylococcus haemolyticus    Coag Negative Staphylococcus    Single set isolate, possible contaminant. Contact    Microbiology if susceptibility testing clinically    indicated.    ***Blood Panel PCR results on this specimen are available    approximately 3 hours after the Gram stain result.***    Gram stain, PCR, and/or culture results may not always    correspond due to difference in methodologies.    ************************************************************    This PCR assay was performed by multiplex PCR. This    Assay tests for 66 bacterial and resistance gene targets.    Please refer to the [facility] Labs test directory    at [email or web address] for details.  Organism: Blood Culture PCR (20 Jan 2023 22:23)  Organism: Blood Culture PCR (20 Jan 2023 22:23)        PHYSICAL EXAM:  CONSTITUTIONAL: No acute distress, AAOx3  HEAD: Atraumatic, normocephalic  EYES: EOM intact, PERRLA, conjunctiva and sclera clear  ENT: Supple, no masses, no thyromegaly, no bruits, no JVD; moist mucous membranes  PULMONARY: Clear to auscultation bilaterally; no wheezes, rales, or rhonchi  CARDIOVASCULAR: Regular rate and rhythm; no murmurs, rubs, or gallops  GASTROINTESTINAL: Soft, non-tender, non-distended; bowel sounds present  MUSCULOSKELETAL: +3 bilateral LLE reaching knees, erythema of bilateral feet   NEUROLOGY: can't move left leg bcz of heaviness and pain     ASSESSMENT & PLAN  57 year old female with PMH of COPD on 4 L home O2, CORNELIUS on CPAP, HFrEF (19% on TTE 02/2022, s/p ICD, pulmonary HTN, CAD s/p PCI to RCA)1, HTN, HLD, CVA with residual LLE weakness, DM, active smoker on home O2, presented to the ED for foot pain.     # bilateral LE edema, CHF Exacerbation vs. Cellulitis (less likely)   # Chronic Hypoxic Respiratory Failure- slightly worse   # HFrEF   - No out of proportion tenderness, open wound, or purulence on exam   - subjective dyspnea on home 4L NC   - s/p rocephin and flagyl in ED, but changes consistent with venous stasis, not cellulits; no leukocytosis   - BNP 8880, last TTE EF 25-30%   - CXR showed cardiomegaly and cephalization   - bilateral LE duplex negative   - c/w entresto   - started on lasix 40 mg IV BID   - cefepime stopped ( does not look like cellulitis )  - blood cx +ve for coagulase negative gram + cocci, f/u repeat ( probably contaminated )  - arterial duplex: no flow in distal R SFA, proximal and mid left SFA, decreased flow in tibia arteries   - f/u vascular consult, no evidence of acute limb ischemia   - f/u EP consult for life vest or AICD re-implantation ( AICD removed previously bcz of bacteremia )   Discussed with EP, pt had life vest 2x before and was non compliant and lost the vests. For new AICD they need ID clearance before given +ve blood cx     # COPD - on home 4L O2  # CORNELIUS - on CPAP at home   - c/w symbicort     # CAD s/p PCI   # HTN   - c/w plavix   - c/w entresto   - c/w metoprolol 100mg ER     # DM   - hold home farxiga and metformin   - fingersticks and insulin PRN     # Chronic normocytic anemia   - Hb 10.3; can f/u Fe studies      # DLD     # Hx of CVA  - c/w plavix     DVT ppx: Lovenox   GI ppx: protonix   Diet: dash   Dispo: acute

## 2023-01-22 LAB
ALBUMIN SERPL ELPH-MCNC: 3.4 G/DL — LOW (ref 3.5–5.2)
ALP SERPL-CCNC: 75 U/L — SIGNIFICANT CHANGE UP (ref 30–115)
ALT FLD-CCNC: <5 U/L — SIGNIFICANT CHANGE UP (ref 0–41)
ANION GAP SERPL CALC-SCNC: 12 MMOL/L — SIGNIFICANT CHANGE UP (ref 7–14)
AST SERPL-CCNC: 15 U/L — SIGNIFICANT CHANGE UP (ref 0–41)
BASOPHILS # BLD AUTO: 0.06 K/UL — SIGNIFICANT CHANGE UP (ref 0–0.2)
BASOPHILS NFR BLD AUTO: 1.3 % — HIGH (ref 0–1)
BILIRUB SERPL-MCNC: 1 MG/DL — SIGNIFICANT CHANGE UP (ref 0.2–1.2)
BUN SERPL-MCNC: 22 MG/DL — HIGH (ref 10–20)
CALCIUM SERPL-MCNC: 9 MG/DL — SIGNIFICANT CHANGE UP (ref 8.4–10.5)
CHLORIDE SERPL-SCNC: 105 MMOL/L — SIGNIFICANT CHANGE UP (ref 98–110)
CO2 SERPL-SCNC: 19 MMOL/L — SIGNIFICANT CHANGE UP (ref 17–32)
CREAT SERPL-MCNC: 1.9 MG/DL — HIGH (ref 0.7–1.5)
EGFR: 31 ML/MIN/1.73M2 — LOW
EOSINOPHIL # BLD AUTO: 0.03 K/UL — SIGNIFICANT CHANGE UP (ref 0–0.7)
EOSINOPHIL NFR BLD AUTO: 0.6 % — SIGNIFICANT CHANGE UP (ref 0–8)
GLUCOSE BLDC GLUCOMTR-MCNC: 116 MG/DL — HIGH (ref 70–99)
GLUCOSE BLDC GLUCOMTR-MCNC: 135 MG/DL — HIGH (ref 70–99)
GLUCOSE BLDC GLUCOMTR-MCNC: 171 MG/DL — HIGH (ref 70–99)
GLUCOSE BLDC GLUCOMTR-MCNC: 192 MG/DL — HIGH (ref 70–99)
GLUCOSE SERPL-MCNC: 116 MG/DL — HIGH (ref 70–99)
HCT VFR BLD CALC: 32 % — LOW (ref 37–47)
HGB BLD-MCNC: 10 G/DL — LOW (ref 12–16)
IMM GRANULOCYTES NFR BLD AUTO: 0.4 % — HIGH (ref 0.1–0.3)
LYMPHOCYTES # BLD AUTO: 0.72 K/UL — LOW (ref 1.2–3.4)
LYMPHOCYTES # BLD AUTO: 15.6 % — LOW (ref 20.5–51.1)
MAGNESIUM SERPL-MCNC: 2.2 MG/DL — SIGNIFICANT CHANGE UP (ref 1.8–2.4)
MCHC RBC-ENTMCNC: 28.1 PG — SIGNIFICANT CHANGE UP (ref 27–31)
MCHC RBC-ENTMCNC: 31.3 G/DL — LOW (ref 32–37)
MCV RBC AUTO: 89.9 FL — SIGNIFICANT CHANGE UP (ref 81–99)
MONOCYTES # BLD AUTO: 0.59 K/UL — SIGNIFICANT CHANGE UP (ref 0.1–0.6)
MONOCYTES NFR BLD AUTO: 12.7 % — HIGH (ref 1.7–9.3)
NEUTROPHILS # BLD AUTO: 3.21 K/UL — SIGNIFICANT CHANGE UP (ref 1.4–6.5)
NEUTROPHILS NFR BLD AUTO: 69.4 % — SIGNIFICANT CHANGE UP (ref 42.2–75.2)
NRBC # BLD: 0 /100 WBCS — SIGNIFICANT CHANGE UP (ref 0–0)
PLATELET # BLD AUTO: 127 K/UL — LOW (ref 130–400)
POTASSIUM SERPL-MCNC: 5.4 MMOL/L — HIGH (ref 3.5–5)
POTASSIUM SERPL-SCNC: 5.4 MMOL/L — HIGH (ref 3.5–5)
PROT SERPL-MCNC: 6.6 G/DL — SIGNIFICANT CHANGE UP (ref 6–8)
RBC # BLD: 3.56 M/UL — LOW (ref 4.2–5.4)
RBC # FLD: 19.6 % — HIGH (ref 11.5–14.5)
SODIUM SERPL-SCNC: 136 MMOL/L — SIGNIFICANT CHANGE UP (ref 135–146)
WBC # BLD: 4.63 K/UL — LOW (ref 4.8–10.8)
WBC # FLD AUTO: 4.63 K/UL — LOW (ref 4.8–10.8)

## 2023-01-22 PROCEDURE — 99232 SBSQ HOSP IP/OBS MODERATE 35: CPT

## 2023-01-22 RX ORDER — SENNA PLUS 8.6 MG/1
2 TABLET ORAL AT BEDTIME
Refills: 0 | Status: DISCONTINUED | OUTPATIENT
Start: 2023-01-22 | End: 2023-01-25

## 2023-01-22 RX ORDER — OXYCODONE HYDROCHLORIDE 5 MG/1
5 TABLET ORAL EVERY 4 HOURS
Refills: 0 | Status: DISCONTINUED | OUTPATIENT
Start: 2023-01-22 | End: 2023-01-29

## 2023-01-22 RX ORDER — LANOLIN ALCOHOL/MO/W.PET/CERES
5 CREAM (GRAM) TOPICAL AT BEDTIME
Refills: 0 | Status: DISCONTINUED | OUTPATIENT
Start: 2023-01-22 | End: 2023-02-04

## 2023-01-22 RX ORDER — POLYETHYLENE GLYCOL 3350 17 G/17G
17 POWDER, FOR SOLUTION ORAL DAILY
Refills: 0 | Status: DISCONTINUED | OUTPATIENT
Start: 2023-01-22 | End: 2023-01-25

## 2023-01-22 RX ADMIN — Medication 5 MILLIGRAM(S): at 15:17

## 2023-01-22 RX ADMIN — PANTOPRAZOLE SODIUM 40 MILLIGRAM(S): 20 TABLET, DELAYED RELEASE ORAL at 05:54

## 2023-01-22 RX ADMIN — CLOPIDOGREL BISULFATE 75 MILLIGRAM(S): 75 TABLET, FILM COATED ORAL at 15:17

## 2023-01-22 RX ADMIN — Medication 5 MILLIGRAM(S): at 23:32

## 2023-01-22 RX ADMIN — Medication 1: at 11:33

## 2023-01-22 RX ADMIN — OXYCODONE HYDROCHLORIDE 5 MILLIGRAM(S): 5 TABLET ORAL at 19:51

## 2023-01-22 RX ADMIN — BUDESONIDE AND FORMOTEROL FUMARATE DIHYDRATE 2 PUFF(S): 160; 4.5 AEROSOL RESPIRATORY (INHALATION) at 12:01

## 2023-01-22 RX ADMIN — SENNA PLUS 2 TABLET(S): 8.6 TABLET ORAL at 22:25

## 2023-01-22 RX ADMIN — GABAPENTIN 300 MILLIGRAM(S): 400 CAPSULE ORAL at 15:50

## 2023-01-22 RX ADMIN — ENOXAPARIN SODIUM 40 MILLIGRAM(S): 100 INJECTION SUBCUTANEOUS at 05:54

## 2023-01-22 RX ADMIN — Medication 145 MILLIGRAM(S): at 15:17

## 2023-01-22 RX ADMIN — POLYETHYLENE GLYCOL 3350 17 GRAM(S): 17 POWDER, FOR SOLUTION ORAL at 15:17

## 2023-01-22 RX ADMIN — OXYCODONE HYDROCHLORIDE 5 MILLIGRAM(S): 5 TABLET ORAL at 14:29

## 2023-01-22 RX ADMIN — ATORVASTATIN CALCIUM 80 MILLIGRAM(S): 80 TABLET, FILM COATED ORAL at 22:25

## 2023-01-22 RX ADMIN — BUDESONIDE AND FORMOTEROL FUMARATE DIHYDRATE 2 PUFF(S): 160; 4.5 AEROSOL RESPIRATORY (INHALATION) at 19:53

## 2023-01-22 RX ADMIN — OXYCODONE HYDROCHLORIDE 5 MILLIGRAM(S): 5 TABLET ORAL at 20:51

## 2023-01-22 RX ADMIN — GABAPENTIN 300 MILLIGRAM(S): 400 CAPSULE ORAL at 22:25

## 2023-01-22 RX ADMIN — GABAPENTIN 300 MILLIGRAM(S): 400 CAPSULE ORAL at 05:54

## 2023-01-22 RX ADMIN — Medication 25 MICROGRAM(S): at 05:54

## 2023-01-22 NOTE — PROGRESS NOTE ADULT - SUBJECTIVE AND OBJECTIVE BOX
WILLIAN MARY  57y  [facility] T5-3C 012 B      Patient is a 57y old  Female who presents with a chief complaint of     INTERVAL HPI/OVERNIGHT EVENTS:  Patient still complaining of bilateral foot pain despite improvement in lower leg edema.   no other events noted             FAMILY HISTORY:  FH: diabetes mellitus (Father, Mother)      T(C): 37.3 (01-20-23 @ 04:48), Max: 37.3 (01-20-23 @ 04:48)  HR: 78 (01-20-23 @ 05:46) (78 - 99)  BP: 99/57 (01-20-23 @ 05:46) (92/50 - 99/65)  RR: 18 (01-20-23 @ 05:46) (18 - 20)  SpO2: 96% (01-20-23 @ 05:46) (96% - 98%)  Wt(kg): --Vital Signs Last 24 Hrs  T(C): 37.3 (20 Jan 2023 04:48), Max: 37.3 (20 Jan 2023 04:48)  T(F): 99.1 (20 Jan 2023 04:48), Max: 99.1 (20 Jan 2023 04:48)  HR: 78 (20 Jan 2023 05:46) (78 - 99)  BP: 99/57 (20 Jan 2023 05:46) (92/50 - 99/65)  BP(mean): 72 (20 Jan 2023 00:15) (72 - 72)  RR: 18 (20 Jan 2023 05:46) (18 - 20)  SpO2: 96% (20 Jan 2023 05:46) (96% - 98%)    Parameters below as of 20 Jan 2023 05:46  Patient On (Oxygen Delivery Method): nasal cannula  O2 Flow (L/min): 4      PHYSICAL EXAM:  GENERAL: NAD, well-groomed, well-developed  NERVOUS SYSTEM:  Alert & Oriented X3, Good concentration;  PULM: Clear to auscultation bilaterally  CARDIAC: Regular rate and rhythm;  GI: Soft, Nontender, Nondistended; Bowel sounds present  EXTREMITIES: lower leg edema resolving      Consultant(s) Notes Reviewed:  [x ] YES  [ ] NO  Care Discussed with Consultants/Other Providers [ x] YES  [ ] NO    LABS:                            10.0   4.67  )-----------( 190      ( 20 Jan 2023 08:46 )             32.9   01-20    134<L>  |  105  |  14  ----------------------------<  66<L>  5.6<H>   |  21  |  1.4    Ca    8.9      20 Jan 2023 08:46  Mg     1.6     01-20    TPro  6.8  /  Alb  3.3<L>  /  TBili  1.5<H>  /  DBili  x   /  AST  16  /  ALT  <5  /  AlkPhos  75  01-20            Culture - Blood (collected 18 Jan 2023 21:11)  Source: .Blood Blood-Peripheral  Preliminary Report (20 Jan 2023 07:01):    No growth to date.    Culture - Blood (collected 18 Jan 2023 21:11)  Source: .Blood Blood-Peripheral  Gram Stain (20 Jan 2023 06:08):    Growth in aerobic bottle: Gram positive cocci in pairs  Preliminary Report (20 Jan 2023 06:09):    Growth in aerobic bottle: Gram positive cocci in pairs    ***Blood Panel PCR results on this specimen are available    approximately 3 hours after the Gram stain result.***    Gram stain, PCR, and/or culture results may not always    correspond due to difference in methodologies.    ************************************************************    This PCR assay was performed by multiplex PCR. This    Assay tests for 66 bacterial and resistance gene targets.    Please refer to the [facility] Labs test directory    at [email or web address] for details.  Organism: Blood Culture PCR (20 Jan 2023 07:09)  Organism: Blood Culture PCR (20 Jan 2023 07:09)      budesonide 160 MICROgram(s)/formoterol 4.5 MICROgram(s) Inhaler 2 Puff(s) Inhalation two times a day  clopidogrel Tablet 75 milliGRAM(s) Oral daily  dextrose 5%. 1000 milliLiter(s) IV Continuous <Continuous>  dextrose 5%. 1000 milliLiter(s) IV Continuous <Continuous>  dextrose 50% Injectable 25 Gram(s) IV Push once  dextrose 50% Injectable 12.5 Gram(s) IV Push once  dextrose 50% Injectable 25 Gram(s) IV Push once  dextrose Oral Gel 15 Gram(s) Oral once PRN  enoxaparin Injectable 40 milliGRAM(s) SubCutaneous every 24 hours  fenofibrate Tablet 145 milliGRAM(s) Oral daily  furosemide   Injectable 40 milliGRAM(s) IV Push once  gabapentin 300 milliGRAM(s) Oral three times a day  glucagon  Injectable 1 milliGRAM(s) IntraMuscular once  insulin lispro (ADMELOG) corrective regimen sliding scale   SubCutaneous three times a day before meals  levothyroxine 25 MICROGram(s) Oral daily  metolazone 2.5 milliGRAM(s) Oral once  metoprolol succinate  milliGRAM(s) Oral daily  oxyCODONE    IR 5 milliGRAM(s) Oral every 8 hours PRN  pantoprazole    Tablet 40 milliGRAM(s) Oral before breakfast    57 year old female with PMH of COPD on 4 L home O2, CORNELIUS on CPAP, HFrEF (19% on TTE 02/2022, s/p ICD, pulmonary HTN, CAD s/p PCI to RCA)1, HTN, HLD, CVA with residual LLE weakness, DM, active smoker on home O2, presented to the ED for foot pain.   Patient does not know any of her meds or doses; verify with [employer] in the AM. Meds started using surescripts.       1. SOB associated with  severe lower extremity edema due to acute systolic CHF   - Admit to medicine         - CXR:CHF. BNP elevated. last CXR:Showing Stable CHF. BNP:pending   - Was on lasix 40mg IV bid that I hold due to worsening kidney function   * pt was on torsemide 20mg po daily   - Stop spironolactone for now due to K>5.5   - Stop abs no signs of cellulitis  - Blood cx :Staph haemolyticus in one bottle Likely contamination.    - Cont entresto   - Cont metoprolol   * Last echo showed EF:19%   - EP consult appreciated  * pt was given 2 life vest that she lost. Plan for ICD   - Arterial duplex:  a) No flow is visualized in distal right SFA and proximal and mid left SFA.  b) Reconstitution in the popliteal artery bilaterally with diminished flow in tibial arteries.  - Cardiovascular consult:  a)  Will discuss possible vascular intervention  b) Continue Plavix for now  c) Consider high intensity statin unless there's a contra-indications or if patient didn't tolerate it in the past  d)  Smoking cessation       2. hx of chronic hypoxic resp failure on 4 liter oxygen  due to COPD   - Cont inhalers  - not  in acute exacerbation     3. CORNELIUS  - COnt CPAP       4. CAD s/p PCI / HTN   - c/w plavix   - c/w entresto   - c/w metoprolol 100mg ER     5. DM-II complicated  - hold home farxiga and metformin   - fingersticks and insulin PRN     6. Chronic normocytic anemia   - Hb 10.3; can f/u Fe studies     7. D LD   - Cont statin     8. Hx of CVA  - c/w plavix   - Cont statin     9.  Acute kidney injury likely due to lasix   - Hold lasix for now     DVT ppx: Lovenox   GI ppx: protonix   Diet: dash   Dispo: acute                 Case Discussed with House Staff   36  minutes spent on total encounter; more than 50% of the visit was spent counseling and/or coordinating care by the attending physician.   Spectra [phone]

## 2023-01-22 NOTE — PHYSICAL THERAPY INITIAL EVALUATION ADULT - ADDITIONAL COMMENTS
pt falling asleep, unable to answer questions at this time, as per chart, pt lives alone in an apt, +elevator, +HHA 8d x 7h, has w/c, hospital bed, shower chair, commode

## 2023-01-23 LAB
ALBUMIN SERPL ELPH-MCNC: 3.3 G/DL — LOW (ref 3.5–5.2)
ALP SERPL-CCNC: 73 U/L — SIGNIFICANT CHANGE UP (ref 30–115)
ALT FLD-CCNC: <5 U/L — SIGNIFICANT CHANGE UP (ref 0–41)
ANION GAP SERPL CALC-SCNC: 9 MMOL/L — SIGNIFICANT CHANGE UP (ref 7–14)
AST SERPL-CCNC: 13 U/L — SIGNIFICANT CHANGE UP (ref 0–41)
BASOPHILS # BLD AUTO: 0.07 K/UL — SIGNIFICANT CHANGE UP (ref 0–0.2)
BASOPHILS NFR BLD AUTO: 1.7 % — HIGH (ref 0–1)
BILIRUB SERPL-MCNC: 0.9 MG/DL — SIGNIFICANT CHANGE UP (ref 0.2–1.2)
BUN SERPL-MCNC: 25 MG/DL — HIGH (ref 10–20)
CALCIUM SERPL-MCNC: 8.8 MG/DL — SIGNIFICANT CHANGE UP (ref 8.4–10.5)
CHLORIDE SERPL-SCNC: 108 MMOL/L — SIGNIFICANT CHANGE UP (ref 98–110)
CO2 SERPL-SCNC: 21 MMOL/L — SIGNIFICANT CHANGE UP (ref 17–32)
CREAT SERPL-MCNC: 1.8 MG/DL — HIGH (ref 0.7–1.5)
EGFR: 33 ML/MIN/1.73M2 — LOW
EOSINOPHIL # BLD AUTO: 0.05 K/UL — SIGNIFICANT CHANGE UP (ref 0–0.7)
EOSINOPHIL NFR BLD AUTO: 1.2 % — SIGNIFICANT CHANGE UP (ref 0–8)
GLUCOSE BLDC GLUCOMTR-MCNC: 110 MG/DL — HIGH (ref 70–99)
GLUCOSE BLDC GLUCOMTR-MCNC: 119 MG/DL — HIGH (ref 70–99)
GLUCOSE BLDC GLUCOMTR-MCNC: 138 MG/DL — HIGH (ref 70–99)
GLUCOSE BLDC GLUCOMTR-MCNC: 145 MG/DL — HIGH (ref 70–99)
GLUCOSE SERPL-MCNC: 110 MG/DL — HIGH (ref 70–99)
HCT VFR BLD CALC: 30 % — LOW (ref 37–47)
HGB BLD-MCNC: 9.4 G/DL — LOW (ref 12–16)
IMM GRANULOCYTES NFR BLD AUTO: 0.2 % — SIGNIFICANT CHANGE UP (ref 0.1–0.3)
LYMPHOCYTES # BLD AUTO: 0.85 K/UL — LOW (ref 1.2–3.4)
LYMPHOCYTES # BLD AUTO: 20.9 % — SIGNIFICANT CHANGE UP (ref 20.5–51.1)
MAGNESIUM SERPL-MCNC: 2.1 MG/DL — SIGNIFICANT CHANGE UP (ref 1.8–2.4)
MCHC RBC-ENTMCNC: 28.3 PG — SIGNIFICANT CHANGE UP (ref 27–31)
MCHC RBC-ENTMCNC: 31.3 G/DL — LOW (ref 32–37)
MCV RBC AUTO: 90.4 FL — SIGNIFICANT CHANGE UP (ref 81–99)
MONOCYTES # BLD AUTO: 0.69 K/UL — HIGH (ref 0.1–0.6)
MONOCYTES NFR BLD AUTO: 17 % — HIGH (ref 1.7–9.3)
NEUTROPHILS # BLD AUTO: 2.4 K/UL — SIGNIFICANT CHANGE UP (ref 1.4–6.5)
NEUTROPHILS NFR BLD AUTO: 59 % — SIGNIFICANT CHANGE UP (ref 42.2–75.2)
NRBC # BLD: 0 /100 WBCS — SIGNIFICANT CHANGE UP (ref 0–0)
NT-PROBNP SERPL-SCNC: HIGH PG/ML (ref 0–300)
PLATELET # BLD AUTO: 175 K/UL — SIGNIFICANT CHANGE UP (ref 130–400)
POTASSIUM SERPL-MCNC: 5.1 MMOL/L — HIGH (ref 3.5–5)
POTASSIUM SERPL-SCNC: 5.1 MMOL/L — HIGH (ref 3.5–5)
PROT SERPL-MCNC: 6.3 G/DL — SIGNIFICANT CHANGE UP (ref 6–8)
RBC # BLD: 3.32 M/UL — LOW (ref 4.2–5.4)
RBC # FLD: 19.4 % — HIGH (ref 11.5–14.5)
SODIUM SERPL-SCNC: 138 MMOL/L — SIGNIFICANT CHANGE UP (ref 135–146)
WBC # BLD: 4.07 K/UL — LOW (ref 4.8–10.8)
WBC # FLD AUTO: 4.07 K/UL — LOW (ref 4.8–10.8)

## 2023-01-23 PROCEDURE — 99233 SBSQ HOSP IP/OBS HIGH 50: CPT

## 2023-01-23 RX ORDER — SPIRONOLACTONE 25 MG/1
25 TABLET, FILM COATED ORAL DAILY
Refills: 0 | Status: DISCONTINUED | OUTPATIENT
Start: 2023-01-23 | End: 2023-01-24

## 2023-01-23 RX ADMIN — ENOXAPARIN SODIUM 40 MILLIGRAM(S): 100 INJECTION SUBCUTANEOUS at 05:30

## 2023-01-23 RX ADMIN — GABAPENTIN 300 MILLIGRAM(S): 400 CAPSULE ORAL at 22:02

## 2023-01-23 RX ADMIN — BUDESONIDE AND FORMOTEROL FUMARATE DIHYDRATE 2 PUFF(S): 160; 4.5 AEROSOL RESPIRATORY (INHALATION) at 21:59

## 2023-01-23 RX ADMIN — OXYCODONE HYDROCHLORIDE 5 MILLIGRAM(S): 5 TABLET ORAL at 11:54

## 2023-01-23 RX ADMIN — BUDESONIDE AND FORMOTEROL FUMARATE DIHYDRATE 2 PUFF(S): 160; 4.5 AEROSOL RESPIRATORY (INHALATION) at 08:33

## 2023-01-23 RX ADMIN — OXYCODONE HYDROCHLORIDE 5 MILLIGRAM(S): 5 TABLET ORAL at 23:02

## 2023-01-23 RX ADMIN — Medication 145 MILLIGRAM(S): at 11:17

## 2023-01-23 RX ADMIN — PANTOPRAZOLE SODIUM 40 MILLIGRAM(S): 20 TABLET, DELAYED RELEASE ORAL at 05:30

## 2023-01-23 RX ADMIN — Medication 25 MICROGRAM(S): at 05:30

## 2023-01-23 RX ADMIN — GABAPENTIN 300 MILLIGRAM(S): 400 CAPSULE ORAL at 13:58

## 2023-01-23 RX ADMIN — ATORVASTATIN CALCIUM 80 MILLIGRAM(S): 80 TABLET, FILM COATED ORAL at 22:02

## 2023-01-23 RX ADMIN — OXYCODONE HYDROCHLORIDE 5 MILLIGRAM(S): 5 TABLET ORAL at 22:02

## 2023-01-23 RX ADMIN — Medication 5 MILLIGRAM(S): at 22:02

## 2023-01-23 RX ADMIN — GABAPENTIN 300 MILLIGRAM(S): 400 CAPSULE ORAL at 05:30

## 2023-01-23 RX ADMIN — OXYCODONE HYDROCHLORIDE 5 MILLIGRAM(S): 5 TABLET ORAL at 12:35

## 2023-01-23 RX ADMIN — POLYETHYLENE GLYCOL 3350 17 GRAM(S): 17 POWDER, FOR SOLUTION ORAL at 11:17

## 2023-01-23 RX ADMIN — CLOPIDOGREL BISULFATE 75 MILLIGRAM(S): 75 TABLET, FILM COATED ORAL at 11:17

## 2023-01-23 RX ADMIN — SENNA PLUS 2 TABLET(S): 8.6 TABLET ORAL at 22:02

## 2023-01-23 NOTE — CONSULT NOTE ADULT - SUBJECTIVE AND OBJECTIVE BOX
WILLIAN MARY  57y, Female  Allergy: No Known Allergies      CHIEF COMPLAINT:   sob (23 Jan 2023 14:03)      LOS  4d    HPI  HPI:  57 year old female with PMH of COPD on 4 L home O2, CORNELIUS on CPAP, HFrEF (19% on TTE 02/2022, s/p ICD, pulmonary HTN, CAD s/p PCI to RCA)1, HTN, HLD, CVA with residual LLE weakness, DM, active smoker on home O2, presented to the ED for foot pain.     She reports that pain started in bilateral legs two days ago. She also noticed increased swelling in both legs. Pain was so intense, she could no longer ambulate or touch her feet. Symptoms are worse on her L leg. She also reports that her chronic shortness of breath has been worse than usual. She also endorses that she generally feels more swollen than usual.       ED Course:   Vitals: T 98, , /62, RR 18, 99% on 4L NC  Labs: unremarkable   Imaging: none performed    Patient admitted for bilateral LE edema, CHF Exacerbation vs. Cellulitis (less likely)     Patient does not know any of her meds or doses; verify with [employer] in the AM. Meds started using surescripts.        (19 Jan 2023 03:19)      INFECTIOUS DISEASE HISTORY:  ID consulted for +BCX    Seen by ID 11/2022 11/23 S/p explant of AICD with pocket space abscess   11/23 OR Cultures NGTD  11/19 1/3 BCx CoNS/ Strep   11/21,22 BCx NGTD  WBC 5.5    s/p po Linezolid 600 mg q12h for 7 days starting 11/23    Currently ordered for:      PMH  PAST MEDICAL & SURGICAL HISTORY:  Hypertension      Hyperlipidemia      Anxiety and depression      COPD, severe      CHF (congestive heart failure)      Cerebrovascular accident (CVA)  Multiple      Type 2 diabetes mellitus      CKD (chronic kidney disease), stage II      No significant past surgical history          FAMILY HISTORY  No pertinent family history in first degree relatives    No pertinent family history in first degree relatives    No pertinent family history in first degree relatives    FH: diabetes mellitus (Father, Mother)        SOCIAL HISTORY  Social History:  Quit smoking 2 months ago, denies drinking and drug use   Lives with A (22 Mar 2022 00:01)        ROS  General: Denies rigors, nightsweats  HEENT: Denies headache, rhinorrhea, sore throat, eye pain  CV: Denies CP, palpitations  PULM: Denies wheezing, hemoptysis  GI: Denies hematemesis, hematochezia, melena  : Denies discharge, hematuria  MSK: Denies arthralgias, myalgias  SKIN: Denies rash, lesions  NEURO: Denies paresthesias, weakness  PSYCH: Denies depression, anxiety     VITALS:  T(F): 96.9, Max: 97.7 (01-22-23 @ 20:35)  HR: 97  BP: 107/67  RR: 18Vital Signs Last 24 Hrs  T(C): 36.1 (23 Jan 2023 05:00), Max: 36.5 (22 Jan 2023 20:35)  T(F): 96.9 (23 Jan 2023 05:00), Max: 97.7 (22 Jan 2023 20:35)  HR: 97 (23 Jan 2023 14:55) (72 - 97)  BP: 107/67 (23 Jan 2023 14:55) (93/54 - 108/53)  BP(mean): 69 (23 Jan 2023 05:00) (69 - 69)  RR: 18 (23 Jan 2023 05:00) (18 - 18)  SpO2: 96% (23 Jan 2023 10:35) (96% - 96%)    Parameters below as of 23 Jan 2023 10:35  Patient On (Oxygen Delivery Method): nasal cannula        PHYSICAL EXAM:  Gen: NAD, resting in bed  HEENT: Normocephalic, atraumatic  Neck: supple, no lymphadenopathy  CV: Regular rate & regular rhythm, L chest +edema/erythema, tender  healed midline wound  Lungs: decreased BS at bases, no fremitus  Abdomen: Soft, BS present  Ext: Warm, well perfused  Neuro: non focal, awake  Skin: no rash,  stasis dermatitis LE  Lines: no phlebitis     TESTS & MEASUREMENTS:                        9.4    4.07  )-----------( 175      ( 23 Jan 2023 06:11 )             30.0     01-23    138  |  108  |  25<H>  ----------------------------<  110<H>  5.1<H>   |  21  |  1.8<H>    Ca    8.8      23 Jan 2023 06:11  Mg     2.1     01-23    TPro  6.3  /  Alb  3.3<L>  /  TBili  0.9  /  DBili  x   /  AST  13  /  ALT  <5  /  AlkPhos  73  01-23      LIVER FUNCTIONS - ( 23 Jan 2023 06:11 )  Alb: 3.3 g/dL / Pro: 6.3 g/dL / ALK PHOS: 73 U/L / ALT: <5 U/L / AST: 13 U/L / GGT: x               Culture - Blood (collected 01-20-23 @ 18:51)  Source: .Blood Blood  Preliminary Report (01-22-23 @ 01:02):    No growth to date.    Culture - Blood (collected 01-18-23 @ 21:11)  Source: .Blood Blood-Peripheral  Preliminary Report (01-20-23 @ 07:01):    No growth to date.    Culture - Blood (collected 01-18-23 @ 21:11)  Source: .Blood Blood-Peripheral  Gram Stain (01-20-23 @ 06:08):    Growth in aerobic bottle: Gram positive cocci in pairs  Final Report (01-20-23 @ 22:23):    Growth in aerobic bottle: Staphylococcus haemolyticus    Coag Negative Staphylococcus    Single set isolate, possible contaminant. Contact    Microbiology if susceptibility testing clinically    indicated.    ***Blood Panel PCR results on this specimen are available    approximately 3 hours after the Gram stain result.***    Gram stain, PCR, and/or culture results may not always    correspond due to difference in methodologies.    ************************************************************    This PCR assay was performed by multiplex PCR. This    Assay tests for 66 bacterial and resistance gene targets.    Please refer to the [facility] test directory    at [email or web address] for details.  Organism: Blood Culture PCR (01-20-23 @ 22:23)  Organism: Blood Culture PCR (01-20-23 @ 22:23)      -  Coagulase negative Staphylococcus: Detec      Method Type: PCR    Culture - Acid Fast - Other w/Smear (collected 11-23-22 @ 10:02)  Source: .Other None  Final Report (01-07-23 @ 15:03):    No acid-fast bacilli isolated after 6 weeks.    Culture - Surgical Swab (collected 11-23-22 @ 10:02)  Source: .Surgical Swab None  Final Report (11-29-22 @ 17:37):    Growth in fluid media only Enterococcus faecalis (vancomycin resistant)  Organism: Enterococcus faecalis (vancomycin resistant) (11-29-22 @ 17:37)  Organism: Enterococcus faecalis (vancomycin resistant) (11-29-22 @ 17:37)      -  Ampicillin: S <=2 Predicts results to ampicillin/sulbactam, amoxacillin-clavulanate and  piperacillin-tazobactam.      -  Daptomycin: S 1      -  Levofloxacin: R >4      -  Linezolid: S 1      -  Tetracycline: R >8      -  Vancomycin: R >16      Method Type: SAVANAH    Culture - Blood (collected 11-22-22 @ 21:51)  Source: .Blood Blood  Final Report (11-28-22 @ 03:01):    No Growth Final    Culture - Blood (collected 11-22-22 @ 05:21)  Source: .Blood None  Final Report (11-27-22 @ 15:00):    No Growth Final    Culture - Blood (collected 11-21-22 @ 07:30)  Source: .Blood Blood-Peripheral  Final Report (11-26-22 @ 23:01):    No Growth Final    Culture - Blood (collected 11-19-22 @ 22:11)  Source: .Blood Blood  Final Report (11-25-22 @ 07:01):    No Growth Final    Culture - Blood (collected 11-19-22 @ 04:59)  Source: .Blood Blood-Peripheral  Gram Stain (11-20-22 @ 06:53):    Growth in anaerobic bottle: Gram positive cocci in pairs  Final Report (11-21-22 @ 12:08):    Growth in anaerobic bottle: Staphylococcus haemolyticus Coag Negative    Staphylococcus    Single set isolate, possible contaminant. Contact    Microbiology if susceptibility testing clinically    indicated.    Growth in anaerobic bottle: Streptococcus salivarius/vestibularis group    Growth in anaerobic bottle: Streptococcus mitis/oralis group    Alpha hemolytic strep    (not Strep. pneumoniae or Enterococcus)    Single set isolate, possible contaminant. Contact    Microbiology if susceptibility testing clinically    indicated.    ***Blood Panel PCR results on this specimen are available    approximately 3 hours after the Gram stain result.***    Gram stain, PCR, and/or culture results may not always    correspond due to difference in methodologies.    ************************************************************    This PCR assay was performed by multiplex PCR. This    Assay tests for 66 bacterial and resistance gene targets.    Please refer to the [facility] test directory    at [email or web address] for details.  Organism: Blood Culture PCR (11-21-22 @ 12:08)  Organism: Blood Culture PCR (11-21-22 @ 12:08)      -  Coagulase negative Staphylococcus: Detec      -  Streptococcus sp. (Not Grp A, B or S pneumoniae): Detec      Method Type: PCR    Culture - Blood (collected 11-19-22 @ 04:59)  Source: .Blood Blood-Peripheral  Final Report (11-24-22 @ 13:00):    No Growth Final    GI PCR Panel, Stool (collected 02-10-22 @ 22:18)  Source: .Stool Feces  Final Report (02-11-22 @ 15:09):    GI PCR Results: NOT detected    *******Please Note:*******    GI panel PCR evaluates for:    Campylobacter, Plesiomonas shigelloides, Salmonella,    Vibrio, Yersinia enterocolitica, Enteroaggregative    Escherichia coli (EAEC), Enteropathogenic E.coli (EPEC),    Enterotoxigenic E. coli (ETEC) lt/st, Shiga-like    toxin-producing E. coli (STEC) stx1/stx2,    Shigella/ Enteroinvasive E. coli (EIEC), Cryptosporidium,    Cyclospora cayetanensis, Entamoeba histolytica,    Giardia lamblia, Adenovirus F 40/41, Astrovirus,    Norovirus GI/GII, Rotavirus A, Sapovirus        Lactate, Blood: 0.9 mmol/L (01-18-23 @ 21:15)  Blood Gas Venous - Lactate: 0.70 mmol/L (01-18-23 @ 20:33)      INFECTIOUS DISEASES TESTING  COVID-19 PCR: NotDetec (11-22-22 @ 15:30)  COVID-19 PCR: NotDetec (11-18-22 @ 23:46)  COVID-19 PCR: NotDetec (04-03-22 @ 04:30)  Procalcitonin, Serum: 0.11 ng/mL (03-29-22 @ 07:30)  Procalcitonin, Serum: 0.11 ng/mL (03-28-22 @ 15:50)  COVID-19 PCR: NotDetec (03-28-22 @ 05:10)  COVID-19 PCR: NotDetec (03-21-22 @ 21:31)  COVID-19 PCR: Detected (02-13-22 @ 04:30)  Clostridium difficile Toxin by PCR: RESULT INTERPRETATION:    Not detected - No Clostridium difficile toxins detected by amplified DNA  PCR.    C. difficile PCR test results should be interpreted only with  consideration of the patient's clinical situation and history.  This test  willdetect the presence of toxigenic C. difficile.  However it cannot be  used as the sole criteria for the diagnosis of antibiotic associated  diarrhea, antibiotic associated colitis, or pseudomembranous colitis.  Colonization with C. difficile may exceed 20% in hospital patients, the  majority of whom are without Toxigenic Clostridium Difficile disease.  Testing is generally not recommended in children below the age of 1 year,  as up to half of healthy infants are asymptomatically colonized with C.  difficile.  In addition, C. difficile PCR testing cannot be used as a  "test of cure" as dead organism nucleic acids will persist and be  detected after treatment.  Successful treatment of C. difficile disease  is determined by resolution of clinical symptoms. Method: CDPCR  TOXIGENIC CLOST.DIFFICILE NEGATIVE;  027-NAP1-B1 PRESUMPTIVE NEGATIVE.  By: Real-Time PCR (Polymerase Chain Reaction)  NOTE: This method detects the Toxin B gene (tCdB), the  binary toxin gene (CDT), and the single-base-pair  deletion at nucleotide 117 within the gene encoding  a negative regulator of toxin production (tcdC 117).  The combined presence of genes encoding Toxin B and  binary toxin and the tcdC 117 deletion has been  associated with hypervirulent C. difficile strain  known as 027/NAP1/B1, which has been associated with  severe disease outbreaks in healthcare facilities  worldwide. (02-10-22 @ 22:18)  COVID-19 PCR: NotDetec (02-06-22 @ 10:50)      INFLAMMATORY MARKERS      RADIOLOGY & ADDITIONAL TESTS:  I have personally reviewed the last Chest xray  CXR  Xray Chest 1 View- PORTABLE-Urgent:   ACC: [id number] EXAM:  XR CHEST PORTABLE URGENT 1V   ORDERED BY: OFELIA CARABALLO     PROCEDURE DATE:  01/21/2023          INTERPRETATION:  Clinical History/Reason for Exam:  CHF    Technique:  Frontal view the chest.    Comparison: Chest x-ray 1/18/2023.    Findings:    Support devices:  none    Cardiac/mediastinum/hilum: Stable cardiomegaly    Lung parenchyma/ Pleura: Stable pulmonary congestion. No pneumothorax      Skeleton/soft tissues: Stable      Impression:    Stable pulmonary congestion. No pneumothorax    --- End of Report ---            MADDY QUESADA MD; Attending Radiologist  This document has been electronically signed. Jan 21 2023  2:50PM (01-21-23 @ 14:42)      CT      CARDIOLOGY TESTING  12 Lead ECG:   Ventricular Rate 112 BPM    Atrial Rate 112 BPM    P-R Interval 172 ms    QRS Duration 134 ms    Q-T Interval 356 ms    QTC Calculation(Bazett) 485 ms    P Axis 54 degrees    R Axis 202 degrees    T Axis 51 degrees    Diagnosis Line Sinus tachycardia  Non-specific intra-ventricular conduction block  Lateral infarct , age undetermined  Abnormal ECG    Confirmed by Asad Buitrago ([id number]) on 1/19/2023 8:04:59 AM (01-18-23 @ 21:36)      MEDICATIONS  atorvastatin 80 Oral at bedtime  budesonide 160 MICROgram(s)/formoterol 4.5 MICROgram(s) Inhaler 2 Inhalation two times a day  clopidogrel Tablet 75 Oral daily  dextrose 5%. 1000 IV Continuous <Continuous>  dextrose 5%. 1000 IV Continuous <Continuous>  dextrose 50% Injectable 25 IV Push once  dextrose 50% Injectable 12.5 IV Push once  dextrose 50% Injectable 25 IV Push once  enoxaparin Injectable 40 SubCutaneous every 24 hours  fenofibrate Tablet 145 Oral daily  gabapentin 300 Oral three times a day  glucagon  Injectable 1 IntraMuscular once  insulin lispro (ADMELOG) corrective regimen sliding scale  SubCutaneous three times a day before meals  levothyroxine 25 Oral daily  metoprolol succinate  Oral daily  pantoprazole    Tablet 40 Oral before breakfast  polyethylene glycol 3350 17 Oral daily  senna 2 Oral at bedtime  spironolactone 25 Oral daily        ANTIBIOTICS:      ALLERGIES:  No Known Allergies

## 2023-01-23 NOTE — CONSULT NOTE ADULT - ASSESSMENT
Assessment: 57 year old female with PMH of COPD on 4 L home O2, CORNELIUS on CPAP, HFrEF, s/p ICD, pulmonary HTN, CAD s/p PCI to RCA)1, HTN, HLD, CVA with residual LLE weakness, DM, active smoker on home O2, presented to the ED for foot pain. Patient admitted for bilateral LE edema, CHF Exacerbation vs. Cellulitis. Heart failure team consulted to assist with fluid overload/acute decompensated heart failure management in the setting of JEMIMA.     Plan:  Patient is grossly fluid overloaded on exam  POCUS exam- IVC 2.6cm, non collapsing, hepatic vein engorged  Give Bumex 2 mg IV push, then start a Bumex gtt at 1 mg/hr  Start 3% Hypertonic Saline 150ml BID (If infused throughout a central line, run over one hour, if a peripheral line is to be used run over 3 hours)  Continue home BB  Hold home Entresto and spironolactone until hyperkalemia resolves  Get BMP twice daily with magnesium    Maintain potassium >4.0, Mg >2.2  Strict intake and output  Daily weight   Obtain Iron profile  EP follow-up for ICD placement  Will continue to follow

## 2023-01-23 NOTE — PROGRESS NOTE ADULT - SUBJECTIVE AND OBJECTIVE BOX
WILLIAN MARY    57y [facility] T5-3C 012 B    Patient is a 57y old  Female who presents with a chief complaint of     INTERVAL HPI/OVERNIGHT EVENTS:  Patient still complaining of bilateral foot pain despite improvement in lower leg edema.   no other events noted     FAMILY HISTORY:  FH: diabetes mellitus (Father, Mother)    Vital Signs Last 24 Hrs  T(C): 36.1 (23 Jan 2023 05:00), Max: 36.5 (22 Jan 2023 20:35)  T(F): 96.9 (23 Jan 2023 05:00), Max: 97.7 (22 Jan 2023 20:35)  HR: 91 (23 Jan 2023 10:35) (72 - 91)  BP: 96/58 (23 Jan 2023 10:35) (93/54 - 108/53)  BP(mean): 69 (23 Jan 2023 05:00) (69 - 69)  RR: 18 (23 Jan 2023 05:00) (18 - 18)  SpO2: 96% (23 Jan 2023 10:35) (96% - 96%)    Parameters below as of 23 Jan 2023 10:35  Patient On (Oxygen Delivery Method): nasal cannula      Parameters below as of 20 Jan 2023 05:46  Patient On (Oxygen Delivery Method): nasal cannula  O2 Flow (L/min): 4 THIS IS BASELINE O2 USE AT HOME       PHYSICAL EXAM:  GENERAL: NAD, well-groomed, well-developed  NERVOUS SYSTEM:  Alert & Oriented X3, Good concentration;  PULM: Clear to auscultation bilaterally  CARDIAC: Regular rate and rhythm;  GI: Soft, Nontender, Nondistended; Bowel sounds present  EXTREMITIES: lower leg edema resolving      Consultant(s) Notes Reviewed:  [x ] YES  [ ] NO  Care Discussed with Consultants/Other Providers [ x] YES  [ ] NO    LABS:                          9.4    4.07  )-----------( 175      ( 23 Jan 2023 06:11 )             30.0     01-23    138  |  108  |  25<H>  ----------------------------<  110<H>  5.1<H>   |  21  |  1.8<H>    Ca    8.8      23 Jan 2023 06:11  Mg     2.1     01-23    TPro  6.3  /  Alb  3.3<L>  /  TBili  0.9  /  DBili  x   /  AST  13  /  ALT  <5  /  AlkPhos  73  01-23                          10.0   4.67  )-----------( 190      ( 20 Jan 2023 08:46 )             32.9   01-20    134<L>  |  105  |  14  ----------------------------<  66<L>  5.6<H>   |  21  |  1.4    Ca    8.9      20 Jan 2023 08:46  Mg     1.6     01-20    TPro  6.8  /  Alb  3.3<L>  /  TBili  1.5<H>  /  DBili  x   /  AST  16  /  ALT  <5  /  AlkPhos  75  01-20    Culture - Blood (collected 18 Jan 2023 21:11)  Source: .Blood Blood-Peripheral  Preliminary Report (20 Jan 2023 07:01):    No growth to date.    Culture - Blood (collected 18 Jan 2023 21:11)  Source: .Blood Blood-Peripheral  Gram Stain (20 Jan 2023 06:08):    Growth in aerobic bottle: Gram positive cocci in pairs  Preliminary Report (20 Jan 2023 06:09):    Growth in aerobic bottle: Gram positive cocci in pairs    ***Blood Panel PCR results on this specimen are available    approximately 3 hours after the Gram stain result.***    Gram stain, PCR, and/or culture results may not always    correspond due to difference in methodologies.    ************************************************************    This PCR assay was performed by multiplex PCR. This    Assay tests for 66 bacterial and resistance gene targets.    Please refer to the [facility] test directory    at [email or web address] for details.  Organism: Blood Culture PCR (20 Jan 2023 07:09)  Organism: Blood Culture PCR (20 Jan 2023 07:09)      MEDICATIONS  (STANDING):  atorvastatin 80 milliGRAM(s) Oral at bedtime  budesonide 160 MICROgram(s)/formoterol 4.5 MICROgram(s) Inhaler 2 Puff(s) Inhalation two times a day  clopidogrel Tablet 75 milliGRAM(s) Oral daily  dextrose 5%. 1000 milliLiter(s) (50 mL/Hr) IV Continuous <Continuous>  dextrose 5%. 1000 milliLiter(s) (100 mL/Hr) IV Continuous <Continuous>  dextrose 50% Injectable 25 Gram(s) IV Push once  dextrose 50% Injectable 12.5 Gram(s) IV Push once  dextrose 50% Injectable 25 Gram(s) IV Push once  enoxaparin Injectable 40 milliGRAM(s) SubCutaneous every 24 hours  fenofibrate Tablet 145 milliGRAM(s) Oral daily  gabapentin 300 milliGRAM(s) Oral three times a day  glucagon  Injectable 1 milliGRAM(s) IntraMuscular once  insulin lispro (ADMELOG) corrective regimen sliding scale   SubCutaneous three times a day before meals  levothyroxine 25 MICROGram(s) Oral daily  metoprolol succinate  milliGRAM(s) Oral daily  pantoprazole    Tablet 40 milliGRAM(s) Oral before breakfast  polyethylene glycol 3350 17 Gram(s) Oral daily  senna 2 Tablet(s) Oral at bedtime  spironolactone 25 milliGRAM(s) Oral daily    MEDICATIONS  (PRN):  dextrose Oral Gel 15 Gram(s) Oral once PRN Blood Glucose LESS THAN 70 milliGRAM(s)/deciliter  melatonin 5 milliGRAM(s) Oral at bedtime PRN Sleep  oxyCODONE    IR 5 milliGRAM(s) Oral every 4 hours PRN Moderate Pain (4 - 6)      57 year old female with PMH of COPD on 4 L home O2, CORNELIUS on CPAP, HFrEF (19% on TTE 02/2022, s/p ICD, pulmonary HTN, CAD s/p PCI to RCA)1, HTN, HLD, CVA with residual LLE weakness, DM, active smoker on home O2 4L NC, presented to the ED for foot pain.  Patient does not know any of her meds or doses;  Come [facility]: [facility]       1. SOB associated with  severe lower extremity edema due to acute on chronic HFrEF (19%)   - CXR:CHF. BNP elevated.   - Was on lasix 40mg IV bid NOW on hold due to worsening Cr   * pt was on torsemide 20mg po daily   - Resume spironolactone home dose as K trending down / add LOW K Diet / d/w pt to avoid all the OJ she is drinking   - Blood cx :Staph haemolyticus in one bottle Likely contamination.  / Repeat blood cxs   - Cont entresto   - Cont metoprolol   - EP consult appreciated  * PT HAS LIFE VEST AT HOME: STATES SHE DOESN'T NEED TO BRING IT IN HOSPITAL AS SHE IS BEING MONITORED. Plan for ICD W/ DR DUARTE   - Arterial duplex:  a) No flow is visualized in distal right SFA and proximal and mid left SFA.  b) Reconstitution in the popliteal artery bilaterally with diminished flow in tibial arteries.  - Cardiovascular consult:  a)  Will discuss possible vascular intervention  b) Continue Plavix for now  c) Consider high intensity statin unless there's a contra-indications or if patient didn't tolerate it in the past  d) Smoking cessation DISCUSSED ESPECIALLY WHILE ON NC O2       2. hx of chronic hypoxic resp failure on 4 liter oxygen  due to COPD   - Cont inhalers  - not  in acute exacerbation     3. CORNELIUS  - COnt CPAP       4. CAD s/p PCI / HTN   - c/w plavix   - c/w entresto   - c/w metoprolol 100mg ER     5. DM-II complicated  - hold home farxiga and metformin   - fingersticks and insulin PRN     6. Chronic normocytic anemia   - Hb 10.3; can f/u Fe studies     7. D LD   - Cont statin     8. Hx of CVA  - c/w plavix   - Cont statin     9.  Acute kidney injury likely due to lasix   - Hold lasix for now     DVT ppx: Lovenox   GI ppx: protonix   Diet: dash     Handoff   Family: Pt makes own decisions AOx4 and understands her care plan   Pending: HF consult / Cr trend / K trend / Low K diet   Dispo: Acute     Case Discussed with House Staff   51  minutes spent on total encounter; more than 50% of the visit was spent counseling and/or coordinating care by the attending physician.   Spectra x [phone]

## 2023-01-23 NOTE — PROGRESS NOTE ADULT - ASSESSMENT
57 year old female with PMH of COPD on 4 L home O2, CORNELIUS on CPAP, HFrEF (19% on TTE 02/2022, s/p ICD, pulmonary HTN, CAD s/p PCI to RCA)1, HTN, HLD, CVA with residual LLE weakness, DM, active smoker on home O2, presented to the ED for foot pain.     # bilateral LE edema, CHF Exacerbation vs. Cellulitis (less likely)   # Chronic Hypoxic Respiratory Failure- slightly worse   # HFrEF   - No out of proportion tenderness, open wound, or purulence on exam   - subjective dyspnea on home 4L NC   - s/p rocephin and flagyl in ED, but changes consistent with venous stasis, not cellulits; no leukocytosis   - BNP 8880, last TTE EF 25-30%   - CXR showed cardiomegaly and cephalization   - bilateral LE duplex negative   - c/w entresto   - started on lasix 40 mg IV BID   - cefepime stopped ( does not look like cellulitis )  - blood cx +ve for coagulase negative gram + cocci, f/u repeat ( probably contaminated )  - arterial duplex: no flow in distal R SFA, proximal and mid left SFA, decreased flow in tibia arteries   - f/u vascular consult, no evidence of acute limb ischemia   - f/u EP consult for life vest or AICD re-implantation ( AICD removed previously bcz of bacteremia )   Discussed with EP, pt had life vest 2x before and was non compliant and lost the vests. For new AICD they need ID clearance before given +ve blood cx     #PAD  - No flow is visualized in distal right SFA and proximal and mid left SFA.  - Reconstitution in the popliteal artery bilaterally with diminished flow in tibial arteries.  - Vascular wants CTA when kidney function improved  - c/w plavix and statin     #JEMIMA   - Cr 1.8 (b/l 1.1)  - potassium improving (5.1 today 1/23)  - continue holding lasix    - Restart spironolactone   - low potassium diet      # COPD - on home 4L O2  # CORNELIUS - on CPAP at home   - c/w symbicort     # CAD s/p PCI   # HTN   - c/w plavix   - c/w entresto   - c/w metoprolol 100mg ER     # DM   - hold home farxiga and metformin   - fingersticks and insulin PRN     # Chronic normocytic anemia   - Hb 10.3; can f/u Fe studies      # DLD     # Hx of CVA  - c/w plavix     DVT ppx: Lovenox   GI ppx: protonix   Diet: dash   Dispo: acute

## 2023-01-23 NOTE — CONSULT NOTE ADULT - SUBJECTIVE AND OBJECTIVE BOX
Date of Admission: 23    Chief Complaint: Patient is a 57y old  Female who presents with a chief complaint of sob (2023 14:03)    HISTORY OF PRESENT ILLNESS: 57 year old female with PMH of COPD on 4 L home O2, CORNELIUS on CPAP, HFrEF (19% on TTE 2022, s/p ICD, pulmonary HTN, CAD s/p PCI to RCA)1, HTN, HLD, CVA with residual LLE weakness, DM, active smoker on home O2, presented to the ED for foot pain.   She reports that pain started in bilateral legs two days ago. She also noticed increased swelling in both legs. Pain was so intense, she could no longer ambulate or touch her feet. Symptoms are worse on her L leg. She also reports that her chronic shortness of breath has been worse than usual. She also endorses that she generally feels more swollen than usual.   Patient admitted for bilateral LE edema, CHF Exacerbation vs. Cellulitis (less likely)   Patient does not know any of her meds or doses; verify with AUTOFACT in the AM. Meds started using surescripts. (2023 03:19)      Patient well known to heart failure team from multiple previous admissions. Patient reports progressively worsening LE edema and tenderness x1 week. Reports her breathing is baseline (mild SOB 2/2 COPD). Endorses home medication compliance but is unaware of which medications she takes. She believes she is maintaining a low Na diet at home, however, she stated she orders Chinese food often. Denies chest pain, palpitations, n/v. +early satiety +decrease in appetite +cough.        PAST MEDICAL & SURGICAL HISTORY:  Hypertension  Hyperlipidemia  Anxiety and depression  COPD, severe  CHF (congestive heart failure)  Cerebrovascular accident (CVA)Multiple  Type 2 diabetes mellitus  CKD (chronic kidney disease), stage II  No significant past surgical history        FAMILY HISTORY:  No pertinent family history of premature cardiovascular disease in first degree relatives.  Mother:  of cancer at 65  Father:  of an overdose at 50    SOCIAL HISTORY: Former smoker- states she quit 1 week ago. Denies alcohol or drug use.       Allergies  No Known Allergies    Intolerances    	    REVIEW OF SYSTEMS:  CONSTITUTIONAL: No fever, weight loss, or fatigue  CARDIOLOGY: denies chest pain, + mild shortness of breath (chronic), no syncopal episodes.   RESPIRATORY: + mild shortness of breath, + wheezing.   NEUROLOGICAL: no weakness, no focal deficits to report.  ENDOCRINOLOGICAL: no recent change in diabetic medications.   GI: no BRBPR, no N,V, diarrhea.    PSYCHIATRY: normal mood and affect  HEENT: no nasal discharge, no ecchymosis  SKIN: no ecchymosis, no breakdown  MUSCULOSKELETAL: Full range of motion x2, B/L LE weak- WC bound    PHYSICAL EXAM:  General Appearance: normal for age and gender. 	  Neck: unable to assess due to body habitus  Eyes: Extra Ocular muscles intact.   Cardiovascular: regular rate and rhythm S1 S2, , No murmurs,+2-3 B/L edema up to thighs  Respiratory: +crackles   Psychiatry: Alert and oriented x 3, Mood & affect appropriate  Gastrointestinal:  Soft, Non-tender  Skin/Integumentary : No rashes, No ecchymoses, No cyanosis	  Neurologic: Non-focal  Musculoskeletal/ extremities: Normal range of motion, No clubbing, cyanosis   Vascular: Peripheral pulses palpable 2+ bilaterally    CARDIAC MARKERS:  Serum Pro-Brain Natriuretic Peptide: 4892 pg/mL (22 @ 10:50)      TELEMETRY EVENTS: 	    EC23    Ventricular Rate 112 BPM  Atrial Rate 112 BPM  P-R Interval 172 ms  QRS Duration 134 ms  Q-T Interval 356 ms  QTC Calculation(Bazett) 485 ms  P Axis 54 degrees  R Axis 202 degrees  T Axis 51 degrees    Diagnosis Line Sinus tachycardia  Non-specific intra-ventricular conduction block  Lateral infarct , age undetermined  Abnormal ECG    Confirmed by Asad Buitrago ([id number]) on 2023 8:04:59 AM        PREVIOUS DIAGNOSTIC TESTING:      TTE 22      Summary:   1. Moderately decreased global left ventricular systolic function with   estimated EF of 25-30% with increased wall thickness.   2. The left ventricular diastolic function could not be assessed in this   study.   3. Mildly enlarged right ventricle (RVEDD = 4.3cm) with moderately   reduced function.   4. Diastolic septal wall flattening consistent with RV volume overload.   5. Mild biatrial enlargement.   6. Mitral valve with bileaflet tethering and resultant mild   regurgitation.   7. Severe tricuspid regurgitation with hepatic vein systolic flow   reversal.   8. Sclerotic aortic valve with normal opening and mild regurgitation.   9. Severe pulmonary hypertension. PASP is at least 65mmHg; this may be   underestimated due to severity of tricuspid regurgitation.  10. Moderate sized pericardial effusion localized posterior tothe right   atrium without echocardiographic evidence of tamponade physiology.  11. Compared to prior study, pericardial effusion size is stable;   findings are overall similar (pHTN severity was likely underestimated on   prior study 2/2 severe TR).    	    Home Medications:  Albuterol (Eqv-ProAir HFA) 90 mcg/inh inhalation aerosol: 2 puff(s) inhaled every 6 hours, As Needed (2021 11:22)  aspirin 81 mg oral tablet: 1 tab(s) orally once a day (2021 11:22)  fenofibrate 145 mg oral tablet: 1 tab(s) orally once a day (2021 11:22)  glipiZIDE 10 mg oral tablet: 1 tab(s) orally 2 times a day (2021 11:22)  Lovaza 1000 mg oral capsule: 2 cap(s) orally 2 times a day (2021 14:52)  metFORMIN 1000 mg oral tablet: 1 tab(s) orally 2 times a day Do not take until  (2021 12:58)  Plavix 75 mg oral tablet: 1 tab(s) orally once a day (2021 11:22)  Vitamin D2 1.25 mg (50,000 intl units) oral capsule: 1 cap(s) orally once a week (2021 14:52)    MEDICATIONS  (STANDING):  aspirin  chewable 81 milliGRAM(s) Oral daily  atorvastatin 80 milliGRAM(s) Oral at bedtime  budesonide 160 MICROgram(s)/formoterol 4.5 MICROgram(s) Inhaler 2 Puff(s) Inhalation two times a day  chlorhexidine 4% Liquid 1 Application(s) Topical <User Schedule>  clopidogrel Tablet 75 milliGRAM(s) Oral daily  enoxaparin Injectable 40 milliGRAM(s) SubCutaneous daily  fenofibrate Tablet 145 milliGRAM(s) Oral daily  furosemide   Injectable 40 milliGRAM(s) IV Push two times a day  magnesium sulfate  IVPB 2 Gram(s) IV Intermittent once  metoprolol succinate ER 50 milliGRAM(s) Oral daily  omega-3-Acid Ethyl Esters 2 Gram(s) Oral two times a day  pantoprazole    Tablet 40 milliGRAM(s) Oral before breakfast  sacubitril 49 mG/valsartan 51 mG 1 Tablet(s) Oral two times a day    MEDICATIONS  (PRN):  ALBUTerol    90 MICROgram(s) HFA Inhaler 2 Puff(s) Inhalation every 6 hours PRN Shortness of Breath and/or Wheezing

## 2023-01-23 NOTE — CONSULT NOTE ADULT - ASSESSMENT
ASSESSMENT  57 year old female with PMH of COPD on 4 L home O2, CORNELIUS on CPAP, HFrEF (19% on TTE 02/2022, s/p ICD, pulmonary HTN, CAD s/p PCI to RCA)1, HTN, HLD, CVA with residual LLE weakness, DM, active smoker on home O2, presented to the ED for foot pain.   ID consulted for +BCX    IMPRESSION  #Hx explant ICD due to pocket abscess    1/20 BCX NGTD     1/18 BCX 1/2 bottles Staphylococcus haemolyticus    1/18 BCX NGTD   11/23 OR Cultures NGTD  11/19 1/3 BCx Staphylococcus haemolyticus/ Strep   11/21,22 BCx NGTD  #CKD  Creatinine, Serum: 1.8 (01-23-23 @ 06:11)    Weight (kg): 40.823 (11-30-22 @ 15:51)    RECOMMENDATIONS   -L chest site with edema/erythema and + BCX despite in only 1/4 bottles is the same coNS that previously grew 11/2022  - Recommend imaging of explant site to rule out abscess  - 1 more BCX  - TTE  - If next set of BCX NG, cleared for procedure  - Monitor off antibiotics    If any questions, please call or send a message on [employer] Teams  Please continue to update ID with any pertinent new laboratory or radiographic findings  #[id number]

## 2023-01-23 NOTE — PROGRESS NOTE ADULT - SUBJECTIVE AND OBJECTIVE BOX
Patient is a 57y old  Female who presents with a chief complaint of     INTERVAL HPI/OVERNIGHT EVENTS:  None    FAMILY HISTORY:  FH: diabetes mellitus (Father, Mother)      T(C): 36.1 (01-23-23 @ 05:00), Max: 36.5 (01-22-23 @ 12:43)  HR: 91 (01-23-23 @ 10:35) (72 - 93)  BP: 96/58 (01-23-23 @ 10:35) (93/54 - 108/56)  RR: 18 (01-23-23 @ 05:00) (18 - 18)  SpO2: 96% (01-23-23 @ 10:35) (96% - 96%)  Wt(kg): --Vital Signs Last 24 Hrs  T(C): 36.1 (23 Jan 2023 05:00), Max: 36.5 (22 Jan 2023 12:43)  T(F): 96.9 (23 Jan 2023 05:00), Max: 97.7 (22 Jan 2023 12:43)  HR: 91 (23 Jan 2023 10:35) (72 - 93)  BP: 96/58 (23 Jan 2023 10:35) (93/54 - 108/56)  BP(mean): 69 (23 Jan 2023 05:00) (69 - 69)  RR: 18 (23 Jan 2023 05:00) (18 - 18)  SpO2: 96% (23 Jan 2023 10:35) (96% - 96%)    Parameters below as of 23 Jan 2023 10:35  Patient On (Oxygen Delivery Method): nasal cannula        PHYSICAL EXAM:  GENERAL: NAD, well-groomed, well-developed  HEAD:  Atraumatic, Normocephalic  EYES: EOMI, PERRLA, conjunctiva and sclera clear  ENMT: No tonsillar erythema, exudates, or enlargement; Moist mucous membranes, Good dentition, No lesions  NECK: Supple, No JVD, Normal thyroid  NERVOUS SYSTEM:  Alert & Oriented X3, Good concentration; Motor Strength 5/5 B/L upper and lower extremities; DTRs 2+ intact and symmetric  CHEST/LUNG: Clear to percussion bilaterally; No rales, rhonchi, wheezing, or rubs  HEART: Regular rate and rhythm; No murmurs, rubs, or gallops  ABDOMEN: Soft, Nontender, Nondistended; Bowel sounds present  EXTREMITIES:  bl edema LEs  LYMPH: No lymphadenopathy noted  SKIN: No rashes or lesions      atorvastatin 80 milliGRAM(s) Oral at bedtime  budesonide 160 MICROgram(s)/formoterol 4.5 MICROgram(s) Inhaler 2 Puff(s) Inhalation two times a day  clopidogrel Tablet 75 milliGRAM(s) Oral daily  dextrose 5%. 1000 milliLiter(s) IV Continuous <Continuous>  dextrose 5%. 1000 milliLiter(s) IV Continuous <Continuous>  dextrose 50% Injectable 25 Gram(s) IV Push once  dextrose 50% Injectable 12.5 Gram(s) IV Push once  dextrose 50% Injectable 25 Gram(s) IV Push once  dextrose Oral Gel 15 Gram(s) Oral once PRN  enoxaparin Injectable 40 milliGRAM(s) SubCutaneous every 24 hours  fenofibrate Tablet 145 milliGRAM(s) Oral daily  gabapentin 300 milliGRAM(s) Oral three times a day  glucagon  Injectable 1 milliGRAM(s) IntraMuscular once  insulin lispro (ADMELOG) corrective regimen sliding scale   SubCutaneous three times a day before meals  levothyroxine 25 MICROGram(s) Oral daily  melatonin 5 milliGRAM(s) Oral at bedtime PRN  metoprolol succinate  milliGRAM(s) Oral daily  oxyCODONE    IR 5 milliGRAM(s) Oral every 4 hours PRN  pantoprazole    Tablet 40 milliGRAM(s) Oral before breakfast  polyethylene glycol 3350 17 Gram(s) Oral daily  senna 2 Tablet(s) Oral at bedtime

## 2023-01-24 LAB
ALBUMIN SERPL ELPH-MCNC: 3.2 G/DL — LOW (ref 3.5–5.2)
ALP SERPL-CCNC: 71 U/L — SIGNIFICANT CHANGE UP (ref 30–115)
ALT FLD-CCNC: <5 U/L — SIGNIFICANT CHANGE UP (ref 0–41)
ANION GAP SERPL CALC-SCNC: 6 MMOL/L — LOW (ref 7–14)
ANION GAP SERPL CALC-SCNC: 8 MMOL/L — SIGNIFICANT CHANGE UP (ref 7–14)
AST SERPL-CCNC: 13 U/L — SIGNIFICANT CHANGE UP (ref 0–41)
BASOPHILS # BLD AUTO: 0.05 K/UL — SIGNIFICANT CHANGE UP (ref 0–0.2)
BASOPHILS NFR BLD AUTO: 1.4 % — HIGH (ref 0–1)
BILIRUB SERPL-MCNC: 0.9 MG/DL — SIGNIFICANT CHANGE UP (ref 0.2–1.2)
BUN SERPL-MCNC: 26 MG/DL — HIGH (ref 10–20)
BUN SERPL-MCNC: 27 MG/DL — HIGH (ref 10–20)
CALCIUM SERPL-MCNC: 8.6 MG/DL — SIGNIFICANT CHANGE UP (ref 8.4–10.4)
CALCIUM SERPL-MCNC: 8.7 MG/DL — SIGNIFICANT CHANGE UP (ref 8.4–10.5)
CHLORIDE SERPL-SCNC: 107 MMOL/L — SIGNIFICANT CHANGE UP (ref 98–110)
CHLORIDE SERPL-SCNC: 108 MMOL/L — SIGNIFICANT CHANGE UP (ref 98–110)
CO2 SERPL-SCNC: 22 MMOL/L — SIGNIFICANT CHANGE UP (ref 17–32)
CO2 SERPL-SCNC: 25 MMOL/L — SIGNIFICANT CHANGE UP (ref 17–32)
CREAT SERPL-MCNC: 1.5 MG/DL — SIGNIFICANT CHANGE UP (ref 0.7–1.5)
CREAT SERPL-MCNC: 1.6 MG/DL — HIGH (ref 0.7–1.5)
CULTURE RESULTS: SIGNIFICANT CHANGE UP
EGFR: 38 ML/MIN/1.73M2 — LOW
EGFR: 41 ML/MIN/1.73M2 — LOW
EOSINOPHIL # BLD AUTO: 0.04 K/UL — SIGNIFICANT CHANGE UP (ref 0–0.7)
EOSINOPHIL NFR BLD AUTO: 1.1 % — SIGNIFICANT CHANGE UP (ref 0–8)
GLUCOSE BLDC GLUCOMTR-MCNC: 123 MG/DL — HIGH (ref 70–99)
GLUCOSE BLDC GLUCOMTR-MCNC: 125 MG/DL — HIGH (ref 70–99)
GLUCOSE BLDC GLUCOMTR-MCNC: 141 MG/DL — HIGH (ref 70–99)
GLUCOSE BLDC GLUCOMTR-MCNC: 99 MG/DL — SIGNIFICANT CHANGE UP (ref 70–99)
GLUCOSE SERPL-MCNC: 118 MG/DL — HIGH (ref 70–99)
GLUCOSE SERPL-MCNC: 120 MG/DL — HIGH (ref 70–99)
HCT VFR BLD CALC: 31.5 % — LOW (ref 37–47)
HGB BLD-MCNC: 9.7 G/DL — LOW (ref 12–16)
IMM GRANULOCYTES NFR BLD AUTO: 0.3 % — SIGNIFICANT CHANGE UP (ref 0.1–0.3)
LYMPHOCYTES # BLD AUTO: 0.76 K/UL — LOW (ref 1.2–3.4)
LYMPHOCYTES # BLD AUTO: 21.8 % — SIGNIFICANT CHANGE UP (ref 20.5–51.1)
MAGNESIUM SERPL-MCNC: 1.9 MG/DL — SIGNIFICANT CHANGE UP (ref 1.8–2.4)
MAGNESIUM SERPL-MCNC: 1.9 MG/DL — SIGNIFICANT CHANGE UP (ref 1.8–2.4)
MCHC RBC-ENTMCNC: 27.7 PG — SIGNIFICANT CHANGE UP (ref 27–31)
MCHC RBC-ENTMCNC: 30.8 G/DL — LOW (ref 32–37)
MCV RBC AUTO: 90 FL — SIGNIFICANT CHANGE UP (ref 81–99)
MONOCYTES # BLD AUTO: 0.61 K/UL — HIGH (ref 0.1–0.6)
MONOCYTES NFR BLD AUTO: 17.5 % — HIGH (ref 1.7–9.3)
NEUTROPHILS # BLD AUTO: 2.02 K/UL — SIGNIFICANT CHANGE UP (ref 1.4–6.5)
NEUTROPHILS NFR BLD AUTO: 57.9 % — SIGNIFICANT CHANGE UP (ref 42.2–75.2)
NRBC # BLD: 0 /100 WBCS — SIGNIFICANT CHANGE UP (ref 0–0)
PLATELET # BLD AUTO: 168 K/UL — SIGNIFICANT CHANGE UP (ref 130–400)
POTASSIUM SERPL-MCNC: 5.2 MMOL/L — HIGH (ref 3.5–5)
POTASSIUM SERPL-MCNC: 5.8 MMOL/L — HIGH (ref 3.5–5)
POTASSIUM SERPL-SCNC: 5.2 MMOL/L — HIGH (ref 3.5–5)
POTASSIUM SERPL-SCNC: 5.8 MMOL/L — HIGH (ref 3.5–5)
PROT SERPL-MCNC: 6.3 G/DL — SIGNIFICANT CHANGE UP (ref 6–8)
RBC # BLD: 3.5 M/UL — LOW (ref 4.2–5.4)
RBC # FLD: 19.5 % — HIGH (ref 11.5–14.5)
SODIUM SERPL-SCNC: 138 MMOL/L — SIGNIFICANT CHANGE UP (ref 135–146)
SODIUM SERPL-SCNC: 138 MMOL/L — SIGNIFICANT CHANGE UP (ref 135–146)
SPECIMEN SOURCE: SIGNIFICANT CHANGE UP
WBC # BLD: 3.49 K/UL — LOW (ref 4.8–10.8)
WBC # FLD AUTO: 3.49 K/UL — LOW (ref 4.8–10.8)

## 2023-01-24 PROCEDURE — 99233 SBSQ HOSP IP/OBS HIGH 50: CPT

## 2023-01-24 PROCEDURE — 71250 CT THORAX DX C-: CPT | Mod: 26

## 2023-01-24 PROCEDURE — 99407 BEHAV CHNG SMOKING > 10 MIN: CPT

## 2023-01-24 RX ORDER — SODIUM CHLORIDE 5 G/100ML
150 INJECTION, SOLUTION INTRAVENOUS
Refills: 0 | Status: DISCONTINUED | OUTPATIENT
Start: 2023-01-24 | End: 2023-01-30

## 2023-01-24 RX ORDER — SODIUM CHLORIDE 5 G/100ML
150 INJECTION, SOLUTION INTRAVENOUS
Refills: 0 | Status: DISCONTINUED | OUTPATIENT
Start: 2023-01-25 | End: 2023-01-30

## 2023-01-24 RX ORDER — MORPHINE SULFATE 50 MG/1
2 CAPSULE, EXTENDED RELEASE ORAL ONCE
Refills: 0 | Status: DISCONTINUED | OUTPATIENT
Start: 2023-01-24 | End: 2023-01-24

## 2023-01-24 RX ORDER — GABAPENTIN 400 MG/1
100 CAPSULE ORAL
Refills: 0 | Status: DISCONTINUED | OUTPATIENT
Start: 2023-01-24 | End: 2023-02-04

## 2023-01-24 RX ORDER — SODIUM ZIRCONIUM CYCLOSILICATE 10 G/10G
10 POWDER, FOR SUSPENSION ORAL ONCE
Refills: 0 | Status: COMPLETED | OUTPATIENT
Start: 2023-01-24 | End: 2023-01-24

## 2023-01-24 RX ORDER — BUMETANIDE 0.25 MG/ML
2 INJECTION INTRAMUSCULAR; INTRAVENOUS ONCE
Refills: 0 | Status: COMPLETED | OUTPATIENT
Start: 2023-01-24 | End: 2023-01-24

## 2023-01-24 RX ORDER — SODIUM ZIRCONIUM CYCLOSILICATE 10 G/10G
10 POWDER, FOR SUSPENSION ORAL ONCE
Refills: 0 | Status: DISCONTINUED | OUTPATIENT
Start: 2023-01-24 | End: 2023-01-26

## 2023-01-24 RX ORDER — BUMETANIDE 0.25 MG/ML
1 INJECTION INTRAMUSCULAR; INTRAVENOUS
Qty: 20 | Refills: 0 | Status: DISCONTINUED | OUTPATIENT
Start: 2023-01-24 | End: 2023-02-02

## 2023-01-24 RX ADMIN — BUMETANIDE 5 MG/HR: 0.25 INJECTION INTRAMUSCULAR; INTRAVENOUS at 12:25

## 2023-01-24 RX ADMIN — Medication 145 MILLIGRAM(S): at 12:34

## 2023-01-24 RX ADMIN — SODIUM ZIRCONIUM CYCLOSILICATE 10 GRAM(S): 10 POWDER, FOR SUSPENSION ORAL at 08:56

## 2023-01-24 RX ADMIN — GABAPENTIN 300 MILLIGRAM(S): 400 CAPSULE ORAL at 05:49

## 2023-01-24 RX ADMIN — OXYCODONE HYDROCHLORIDE 5 MILLIGRAM(S): 5 TABLET ORAL at 20:12

## 2023-01-24 RX ADMIN — MORPHINE SULFATE 2 MILLIGRAM(S): 50 CAPSULE, EXTENDED RELEASE ORAL at 21:29

## 2023-01-24 RX ADMIN — Medication 25 MICROGRAM(S): at 05:50

## 2023-01-24 RX ADMIN — ENOXAPARIN SODIUM 40 MILLIGRAM(S): 100 INJECTION SUBCUTANEOUS at 05:49

## 2023-01-24 RX ADMIN — OXYCODONE HYDROCHLORIDE 5 MILLIGRAM(S): 5 TABLET ORAL at 17:30

## 2023-01-24 RX ADMIN — Medication 100 MILLIGRAM(S): at 05:49

## 2023-01-24 RX ADMIN — SODIUM ZIRCONIUM CYCLOSILICATE 10 GRAM(S): 10 POWDER, FOR SUSPENSION ORAL at 17:09

## 2023-01-24 RX ADMIN — BUDESONIDE AND FORMOTEROL FUMARATE DIHYDRATE 2 PUFF(S): 160; 4.5 AEROSOL RESPIRATORY (INHALATION) at 08:58

## 2023-01-24 RX ADMIN — GABAPENTIN 100 MILLIGRAM(S): 400 CAPSULE ORAL at 17:04

## 2023-01-24 RX ADMIN — PANTOPRAZOLE SODIUM 40 MILLIGRAM(S): 20 TABLET, DELAYED RELEASE ORAL at 05:50

## 2023-01-24 RX ADMIN — OXYCODONE HYDROCHLORIDE 5 MILLIGRAM(S): 5 TABLET ORAL at 10:52

## 2023-01-24 RX ADMIN — SODIUM CHLORIDE 50 MILLILITER(S): 5 INJECTION, SOLUTION INTRAVENOUS at 12:15

## 2023-01-24 RX ADMIN — SPIRONOLACTONE 25 MILLIGRAM(S): 25 TABLET, FILM COATED ORAL at 05:50

## 2023-01-24 RX ADMIN — BUMETANIDE 2 MILLIGRAM(S): 0.25 INJECTION INTRAMUSCULAR; INTRAVENOUS at 11:44

## 2023-01-24 RX ADMIN — OXYCODONE HYDROCHLORIDE 5 MILLIGRAM(S): 5 TABLET ORAL at 17:02

## 2023-01-24 RX ADMIN — CLOPIDOGREL BISULFATE 75 MILLIGRAM(S): 75 TABLET, FILM COATED ORAL at 12:35

## 2023-01-24 RX ADMIN — ATORVASTATIN CALCIUM 80 MILLIGRAM(S): 80 TABLET, FILM COATED ORAL at 21:30

## 2023-01-24 RX ADMIN — SENNA PLUS 2 TABLET(S): 8.6 TABLET ORAL at 21:30

## 2023-01-24 RX ADMIN — BUDESONIDE AND FORMOTEROL FUMARATE DIHYDRATE 2 PUFF(S): 160; 4.5 AEROSOL RESPIRATORY (INHALATION) at 21:29

## 2023-01-24 NOTE — PROGRESS NOTE ADULT - SUBJECTIVE AND OBJECTIVE BOX
Vascular Cardiology  Progress note     EMAIL [email or web address]     CC:  leg swelling and pain, L>R    INTERVAL HISTORY:     Continues to have bilateral LE pain at rest.       Allergies    No Known Allergies    Intolerances    	    MEDICATIONS:  buMETAnide Infusion 1 mG/Hr IV Continuous <Continuous>  clopidogrel Tablet 75 milliGRAM(s) Oral daily  enoxaparin Injectable 40 milliGRAM(s) SubCutaneous every 24 hours  metoprolol succinate  milliGRAM(s) Oral daily      budesonide 160 MICROgram(s)/formoterol 4.5 MICROgram(s) Inhaler 2 Puff(s) Inhalation two times a day    gabapentin 100 milliGRAM(s) Oral two times a day  melatonin 5 milliGRAM(s) Oral at bedtime PRN  oxyCODONE    IR 5 milliGRAM(s) Oral every 4 hours PRN    pantoprazole    Tablet 40 milliGRAM(s) Oral before breakfast  polyethylene glycol 3350 17 Gram(s) Oral daily  senna 2 Tablet(s) Oral at bedtime    atorvastatin 80 milliGRAM(s) Oral at bedtime  dextrose 50% Injectable 25 Gram(s) IV Push once  dextrose 50% Injectable 12.5 Gram(s) IV Push once  dextrose 50% Injectable 25 Gram(s) IV Push once  dextrose Oral Gel 15 Gram(s) Oral once PRN  fenofibrate Tablet 145 milliGRAM(s) Oral daily  glucagon  Injectable 1 milliGRAM(s) IntraMuscular once  insulin lispro (ADMELOG) corrective regimen sliding scale   SubCutaneous three times a day before meals  levothyroxine 25 MICROGram(s) Oral daily    dextrose 5%. 1000 milliLiter(s) IV Continuous <Continuous>  dextrose 5%. 1000 milliLiter(s) IV Continuous <Continuous>  sodium chloride 3%. 150 milliLiter(s) IV Continuous <Continuous>  sodium chloride 3%. 150 milliLiter(s) IV Continuous <Continuous>      PAST MEDICAL & SURGICAL HISTORY:  Hypertension      Hyperlipidemia      Anxiety and depression      COPD, severe      CHF (congestive heart failure)      Cerebrovascular accident (CVA)  Multiple      Type 2 diabetes mellitus      CKD (chronic kidney disease), stage II      No significant past surgical history          FAMILY HISTORY:  FH: diabetes mellitus (Father, Mother)        SOCIAL HISTORY:  unchanged    REVIEW OF SYSTEMS:  CONSTITUTIONAL: No fever, weight loss, or fatigue  EYES: No eye pain, visual disturbances, or discharge  ENT:  No difficulty hearing, tinnitus, vertigo; No sinus or throat pain  NECK: No pain or stiffness  RESPIRATORY: No cough, wheezing, chills or hemoptysis; No Shortness of Breath  CARDIOVASCULAR: No chest pain, palpitations, passing out, dizziness, or leg swelling  GASTROINTESTINAL: No abdominal or epigastric pain. No nausea, vomiting, or hematemesis; No diarrhea or constipation. No melena or hematochezia.  GENITOURINARY: No dysuria, frequency, hematuria, or incontinence  NEUROLOGICAL: No headaches, memory loss, loss of strength, numbness, or tremors  SKIN: No itching, burning, rashes, or lesions   LYMPH Nodes: No enlarged glands  ENDOCRINE: No heat or cold intolerance; No hair loss  MUSCULOSKELETAL: b/l LE pain  PSYCHIATRIC: No depression, anxiety, mood swings, or difficulty sleeping  HEME/LYMPH: No easy bruising, or bleeding gums  ALLERGY AND IMMUNOLOGIC: No hives or eczema	    [ x] All others negative	  [ ] Unable to obtain    PHYSICAL EXAM:  T(C): 37 (01-24-23 @ 12:54), Max: 37 (01-24-23 @ 12:54)  HR: 97 (01-24-23 @ 15:41) (88 - 101)  BP: 104/67 (01-24-23 @ 15:41) (92/52 - 118/60)  RR: 18 (01-24-23 @ 12:54) (18 - 18)  SpO2: 96% (01-24-23 @ 05:14) (96% - 96%)  Wt(kg): --  I&O's Summary    23 Jan 2023 07:01  -  24 Jan 2023 07:00  --------------------------------------------------------  IN: 860 mL / OUT: 603 mL / NET: 257 mL    24 Jan 2023 07:01  -  24 Jan 2023 18:10  --------------------------------------------------------  IN: 150 mL / OUT: 1300 mL / NET: -1150 mL        Appearance: Normal	  HEENT:   Normal oral mucosa, PERRL, EOMI	  Cardiovascular: Normal S1 S2, No JVD, No murmurs, 1-2+ pitting edema b/l  Respiratory: crackles  Psychiatry: A & O x 3, Mood & affect appropriate  Gastrointestinal:  Soft, Non-tender, + BS	  Skin: No rashes, No ecchymoses, No cyanosis	  Neurologic: Non-focal  Extremities: Normal range of motion, No clubbing, cyanosis.    Vascular:     Right DP: []palpable [x]non-palpable []audible      Left DP :   []palpable [x]non-palpable []audible  Right PT: []palpable [x] non-palpable []audible   Left PT:  [] palpable [x] non-palpable []audible      LABS:	 	    CBC Full  -  ( 24 Jan 2023 04:43 )  WBC Count : 3.49 K/uL  Hemoglobin : 9.7 g/dL  Hematocrit : 31.5 %  Platelet Count - Automated : 168 K/uL  Mean Cell Volume : 90.0 fL  Mean Cell Hemoglobin : 27.7 pg  Mean Cell Hemoglobin Concentration : 30.8 g/dL  Auto Neutrophil # : 2.02 K/uL  Auto Lymphocyte # : 0.76 K/uL  Auto Monocyte # : 0.61 K/uL  Auto Eosinophil # : 0.04 K/uL  Auto Basophil # : 0.05 K/uL  Auto Neutrophil % : 57.9 %  Auto Lymphocyte % : 21.8 %  Auto Monocyte % : 17.5 %  Auto Eosinophil % : 1.1 %  Auto Basophil % : 1.4 %    01-24    138  |  108  |  26<H>  ----------------------------<  120<H>  5.8<H>   |  22  |  1.5  01-23    138  |  108  |  25<H>  ----------------------------<  110<H>  5.1<H>   |  21  |  1.8<H>    Ca    8.6      24 Jan 2023 04:43  Ca    8.8      23 Jan 2023 06:11  Mg     1.9     01-24  Mg     2.1     01-23    TPro  6.3  /  Alb  3.2<L>  /  TBili  0.9  /  DBili  x   /  AST  13  /  ALT  <5  /  AlkPhos  71  01-24  TPro  6.3  /  Alb  3.3<L>  /  TBili  0.9  /  DBili  x   /  AST  13  /  ALT  <5  /  AlkPhos  73  01-23

## 2023-01-24 NOTE — PROGRESS NOTE ADULT - ASSESSMENT
ASSESSMENT  - Severe bilateral PAD with LE resting pain, Clare 4 (no wounds)     - Lower extremity arterial US with low resistance waveforms concerning for inflow disease and severe bilateral superficial femoral artery disease.   - Acute on chronic HFrEF s/p BiV AICD and explant due to infected pocket - in fluid overload     - plans for re-implant 2/13/23  - CAD s/p PCI in 6/21  - History of pericardial effusion  - Pulmonary hypertension  - COPD on home oxygen - smoking still  - HTN, HLD, DM  - CVA with residual left sided weakness    RECOMMENDATIONS  - Recommend cross-sectional imaging when renal function improved and she is no longer in HF exacerbation  - Continue Plavix and atorvastatin  - Will follow    Arpita Valentine MD  Vascular Cardiology Attending  Please text or call via MS Teams with question

## 2023-01-24 NOTE — PHYSICAL THERAPY INITIAL EVALUATION ADULT - LIVES WITH, PROFILE
per pt she lives with frined in aprtment with + elevator. uses motorized  scooter for mobility. pt is anon ambulatory

## 2023-01-24 NOTE — PHYSICAL THERAPY INITIAL EVALUATION ADULT - GENERAL OBSERVATIONS, REHAB EVAL
8:11-8:27 16 min Chart reviewed, attempted to see pt for PT IE, pt rec sleeping in bed in NAD, pt woke to name, declined PT for now stating she didn't get any sleep last night, PT educated pt on supine LE therex, pt verbalized understanding, pt left as found in NAD, will f/u for IE
PT IE [id number]. Chart reviewed and case discussed with AMBER Berg. pt encountered semi-murray  in bed. Pt appeared to be lethargic, somnolent, oriented x 3 and is able to follow commands. pt c/o 7/10 pain B feet. pt willing to participate in PT session. RN aware.+ IV lock, + tele, + primafit

## 2023-01-24 NOTE — PROGRESS NOTE ADULT - ASSESSMENT
57 year old female with PMH of COPD on 4 L home O2, CORNELIUS on CPAP, HFrEF (19% on TTE 02/2022, s/p ICD, pulmonary HTN, CAD s/p PCI to RCA)1, HTN, HLD, CVA with residual LLE weakness, DM, active smoker on home O2, presented to the ED for foot pain.     # bilateral LE edema, CHF Exacerbation vs. Cellulitis (less likely)   # Chronic Hypoxic Respiratory Failure- slightly worse   # HFrEF   - No out of proportion tenderness, open wound, or purulence on exam   - subjective dyspnea on home 4L NC   - s/p rocephin and flagyl in ED, but changes consistent with venous stasis, not cellulits; no leukocytosis   - BNP 8880, last TTE EF 25-30%   - CXR showed cardiomegaly and cephalization   - bilateral LE duplex negative   - c/w entresto   - started on lasix 40 mg IV BID   - cefepime stopped ( does not look like cellulitis )  - blood cx +ve for coagulase negative gram + cocci, f/u repeat ( probably contaminated )  - arterial duplex: no flow in distal R SFA, proximal and mid left SFA, decreased flow in tibia arteries   - f/u vascular consult, no evidence of acute limb ischemia   - scheduled with EP for ICD on 2/13 - Need ID clearance   - ID rec imaging for explant to ro abscess   - HF rec bumex with hypertonic saline  - Vascular recommends CTA after kidney function improves   Discussed with EP, pt had life vest 2x before and was non compliant and lost the vests. For new AICD they need ID clearance before given +ve blood cx     #PAD  - No flow is visualized in distal right SFA and proximal and mid left SFA.  - Reconstitution in the popliteal artery bilaterally with diminished flow in tibial arteries.  - Vascular wants CTA when kidney function improved  - c/w plavix and statin     #JEMIMA   - Cr 1.5 (b/l 1.1)  - potassium wprsened after restarting spironolactone (5.8 today 1/24)  - continue holding lasix    - Hold spironolactone   - low potassium diet      # COPD - on home 4L O2  # CORNELIUS - on CPAP at home   - c/w symbicort     # CAD s/p PCI   # HTN   - c/w plavix   - c/w entresto   - c/w metoprolol 100mg ER     # DM   - hold home farxiga and metformin   - fingersticks and insulin PRN     # Chronic normocytic anemia   - Hb 10.3; can f/u Fe studies      # DLD     # Hx of CVA  - c/w plavix     DVT ppx: Lovenox   GI ppx: protonix   Diet: dash   Dispo: acute

## 2023-01-24 NOTE — PHYSICAL THERAPY INITIAL EVALUATION ADULT - LEVEL OF INDEPENDENCE: SIT/SUPINE, REHAB EVAL
pt unable to complete activity secondary to feeling weak and lethargic/maximum assist (25% patients effort)

## 2023-01-24 NOTE — PROGRESS NOTE ADULT - SUBJECTIVE AND OBJECTIVE BOX
WILLIAN MARY    57y [facility] T5-3C 012 B    Patient is a 57y old  Female who presents with a chief complaint of SOB    OVERNIGHT EVENTS:  Patient still complaining of bilateral foot pain despite improvement in lower leg edema.   no other events noted   she states she still smokes but only 1 cigarrette a day - smoking cessation discussed     FAMILY HISTORY:  FH: diabetes mellitus (Father, Mother)    Vital Signs Last 24 Hrs  T(C): 37 (24 Jan 2023 12:54), Max: 37 (24 Jan 2023 12:54)  T(F): 98.6 (24 Jan 2023 12:54), Max: 98.6 (24 Jan 2023 12:54)  HR: 97 (24 Jan 2023 15:41) (88 - 101)  BP: 104/67 (24 Jan 2023 15:41) (92/52 - 118/60)  RR: 18 (24 Jan 2023 12:54) (18 - 18)  SpO2: 96% (24 Jan 2023 05:14) (96% - 96%)    Parameters below as of 24 Jan 2023 05:14  Patient On (Oxygen Delivery Method): nasal cannula  O2 Flow (L/min): 4 THIS IS BASELINE O2 USE AT HOME     PHYSICAL EXAM:  GENERAL: NAD, well-groomed, well-developed  NERVOUS SYSTEM:  Alert & Oriented X3, Good concentration;  PULM: Clear to auscultation bilaterally  CARDIAC: Regular rate and rhythm;  GI: Soft, Nontender, Nondistended; Bowel sounds present  EXTREMITIES: lower leg edema resolving      Consultant(s) Notes Reviewed:  [x ] YES      Care Discussed with Consultants/Other Providers [ x] YES     LABS:                        9.7    3.49  )-----------( 168      ( 24 Jan 2023 04:43 )             31.5     01-24    138  |  108  |  26<H>  ----------------------------<  120<H>  5.8<H>   |  22  |  1.5    Ca    8.6      24 Jan 2023 04:43    Mg     1.9     01-24    TPro  6.3  /  Alb  3.2<L>  /  TBili  0.9  /  DBili  x   /  AST  13  /  ALT  <5  /  AlkPhos  71  01-24                          9.4    4.07  )-----------( 175      ( 23 Jan 2023 06:11 )             30.0     01-23    138  |  108  |  25<H>  ----------------------------<  110<H>  5.1<H>   |  21  |  1.8<H>    Ca    8.8      23 Jan 2023 06:11  Mg     2.1     01-23    TPro  6.3  /  Alb  3.3<L>  /  TBili  0.9  /  DBili  x   /  AST  13  /  ALT  <5  /  AlkPhos  73  01-23                          10.0   4.67  )-----------( 190      ( 20 Jan 2023 08:46 )             32.9   01-20    134<L>  |  105  |  14  ----------------------------<  66<L>  5.6<H>   |  21  |  1.4    Ca    8.9      20 Jan 2023 08:46  Mg     1.6     01-20    TPro  6.8  /  Alb  3.3<L>  /  TBili  1.5<H>  /  DBili  x   /  AST  16  /  ALT  <5  /  AlkPhos  75  01-20    Culture - Blood (collected 18 Jan 2023 21:11)  Source: .Blood Blood-Peripheral  Preliminary Report (20 Jan 2023 07:01):    No growth to date.    Culture - Blood (collected 18 Jan 2023 21:11)  Source: .Blood Blood-Peripheral  Gram Stain (20 Jan 2023 06:08):    Growth in aerobic bottle: Gram positive cocci in pairs  Preliminary Report (20 Jan 2023 06:09):    Growth in aerobic bottle: Gram positive cocci in pairs    ***Blood Panel PCR results on this specimen are available    approximately 3 hours after the Gram stain result.***    Gram stain, PCR, and/or culture results may not always    correspond due to difference in methodologies.    ************************************************************    This PCR assay was performed by multiplex PCR. This    Assay tests for 66 bacterial and resistance gene targets.    Please refer to the [facility] Labs test directory    at [email or web address] for details.  Organism: Blood Culture PCR (20 Jan 2023 07:09)  Organism: Blood Culture PCR (20 Jan 2023 07:09)      MEDICATIONS  (STANDING):  atorvastatin 80 milliGRAM(s) Oral at bedtime  budesonide 160 MICROgram(s)/formoterol 4.5 MICROgram(s) Inhaler 2 Puff(s) Inhalation two times a day  buMETAnide Infusion 1 mG/Hr (5 mL/Hr) IV Continuous <Continuous>  clopidogrel Tablet 75 milliGRAM(s) Oral daily  dextrose 5%. 1000 milliLiter(s) (50 mL/Hr) IV Continuous <Continuous>  dextrose 5%. 1000 milliLiter(s) (100 mL/Hr) IV Continuous <Continuous>  dextrose 50% Injectable 25 Gram(s) IV Push once  dextrose 50% Injectable 12.5 Gram(s) IV Push once  dextrose 50% Injectable 25 Gram(s) IV Push once  enoxaparin Injectable 40 milliGRAM(s) SubCutaneous every 24 hours  fenofibrate Tablet 145 milliGRAM(s) Oral daily  gabapentin 100 milliGRAM(s) Oral two times a day  glucagon  Injectable 1 milliGRAM(s) IntraMuscular once  insulin lispro (ADMELOG) corrective regimen sliding scale   SubCutaneous three times a day before meals  levothyroxine 25 MICROGram(s) Oral daily  metoprolol succinate  milliGRAM(s) Oral daily  pantoprazole    Tablet 40 milliGRAM(s) Oral before breakfast  polyethylene glycol 3350 17 Gram(s) Oral daily  senna 2 Tablet(s) Oral at bedtime  sodium chloride 3%. 150 milliLiter(s) (50 mL/Hr) IV Continuous <Continuous>  sodium chloride 3%. 150 milliLiter(s) (50 mL/Hr) IV Continuous <Continuous>    MEDICATIONS  (PRN):  dextrose Oral Gel 15 Gram(s) Oral once PRN Blood Glucose LESS THAN 70 milliGRAM(s)/deciliter  melatonin 5 milliGRAM(s) Oral at bedtime PRN Sleep  oxyCODONE    IR 5 milliGRAM(s) Oral every 4 hours PRN Moderate Pain (4 - 6)      57 year old female with PMH of COPD on 4 L home O2, CORNELIUS on CPAP, HFrEF (19% on TTE 02/2022, s/p ICD, pulmonary HTN, CAD s/p PCI to RCA)1, HTN, HLD, CVA with residual LLE weakness, DM, active smoker on home O2 4L NC, presented to the ED for foot pain.  Patient does not know any of her meds or doses;  Comes from [facility]       1. SOB associated with  severe lower extremity edema due to acute on chronic HFrEF (19%)  - Appreciate HF Consult -> Hypertonic Saline and Bumex Drip   - f/u K/Mg/Cr daily   - CXR:CHF. BNP elevated.   * pt was on torsemide 20mg po daily   - Hold spironolactone home dose  - Blood cx :Staph haemolyticus in one bottle Likely contamination.    - f/u Repeat blood cxs as recommended by ID   - Cont entresto   - Cont metoprolol   - EP consult appreciated  * PT HAS LIFE VEST AT HOME: STATES SHE DOESN'T NEED TO BRING IT IN HOSPITAL AS SHE IS BEING MONITORED. Plan for ICD W/  PERIK   - Arterial duplex:  a) No flow is visualized in distal right SFA and proximal and mid left SFA.  b) Reconstitution in the popliteal artery bilaterally with diminished flow in tibial arteries.  - Cardiovascular consult:  a)  Will discuss possible vascular intervention  b) Continue Plavix for now  c) Consider high intensity statin unless there's a contra-indications or if patient didn't tolerate it in the past  d) Smoking cessation DISCUSSED ESPECIALLY WHILE ON NC O2     2. hx of chronic hypoxic resp failure on 4 liter oxygen  due to COPD   - Cont inhalers  - not  in acute exacerbation     3. CORNELIUS  - Cont CPAP     4. CAD s/p PCI / HTN   - c/w plavix   - c/w entresto   - c/w metoprolol 100mg ER     5. DM-II complicated  - hold home farxiga and metformin   - fingersticks and insulin PRN     6. Chronic Normocytic Anemia   - Hb 10.3; can f/u Fe studies     7. D LD   - Cont statin     8. Hx of CVA  - c/w plavix   - Cont statin     9.  JEMIMA 2/2 HFrEF Exarc  - c/w diuresis per HF recs   - f/u Cr daily if worsens get Nephro consult f/u all electrolytes daily   - I/O's recording     10: Suspecting Diabetic Neuropathy (feet burning)  - check HA1C   - check pro-jason   - start Gabapenting 100mg po bid     11. AICD Site Infection PRIOR h/x   - On LIFE VEST AT HOME  - CT chest reviewed not suggestive of collection in lt chest/breast area  - f/u Repeat Blood cxs and ID clearance for re-implantation of AICD by EP Dr Randle     DVT ppx: Lovenox   GI ppx: protonix   Diet: dash     Handoff   Family: Pt makes own decisions AOx4 and understands her care plan   Pending: Cr trend / K trend / Low K diet / f/u bid bmp / f/u repeat blood cxs   Dispo: Acute     Case Discussed with House Staff   51  minutes spent on total encounter; more than 50% of the visit was spent counseling and/or coordinating care by the attending physician.   Spectra x [phone]

## 2023-01-24 NOTE — PROGRESS NOTE ADULT - SUBJECTIVE AND OBJECTIVE BOX
Patient is a 57y old  Female who presents with a chief complaint of sob (23 Jan 2023 14:03)      INTERVAL HPI/OVERNIGHT EVENTS:  None    FAMILY HISTORY:  FH: diabetes mellitus (Father, Mother)      T(C): 36.2 (01-24-23 @ 05:14), Max: 36.7 (01-23-23 @ 20:41)  HR: 93 (01-24-23 @ 05:14) (93 - 101)  BP: 110/57 (01-24-23 @ 05:14) (92/52 - 115/58)  RR: 18 (01-24-23 @ 05:14) (18 - 18)  SpO2: 96% (01-24-23 @ 05:14) (96% - 96%)  Wt(kg): --Vital Signs Last 24 Hrs  T(C): 36.2 (24 Jan 2023 05:14), Max: 36.7 (23 Jan 2023 20:41)  T(F): 97.1 (24 Jan 2023 05:14), Max: 98 (23 Jan 2023 20:41)  HR: 93 (24 Jan 2023 05:14) (93 - 101)  BP: 110/57 (24 Jan 2023 05:14) (92/52 - 115/58)  BP(mean): --  RR: 18 (24 Jan 2023 05:14) (18 - 18)  SpO2: 96% (24 Jan 2023 05:14) (96% - 96%)    Parameters below as of 24 Jan 2023 05:14  Patient On (Oxygen Delivery Method): nasal cannula  O2 Flow (L/min): 4      PHYSICAL EXAM:  GENERAL: NAD, well-groomed, well-developed  HEAD:  Atraumatic, Normocephalic  EYES: EOMI, PERRLA, conjunctiva and sclera clear  ENMT: No tonsillar erythema, exudates, or enlargement; Moist mucous membranes, Good dentition, No lesions  NECK: Supple, No JVD, Normal thyroid  NERVOUS SYSTEM:  Alert & Oriented X3, Good concentration; Motor Strength 5/5 B/L upper and lower extremities; DTRs 2+ intact and symmetric  CHEST/LUNG: Clear to percussion bilaterally; No rales, rhonchi, wheezing, or rubs  HEART: Regular rate and rhythm; No murmurs, rubs, or gallops  ABDOMEN: Soft, Nontender, Nondistended; Bowel sounds present  EXTREMITIES: B/l lower extremity edema   LYMPH: No lymphadenopathy noted  SKIN: No rashes or lesions      atorvastatin 80 milliGRAM(s) Oral at bedtime  budesonide 160 MICROgram(s)/formoterol 4.5 MICROgram(s) Inhaler 2 Puff(s) Inhalation two times a day  buMETAnide Infusion 1 mG/Hr IV Continuous <Continuous>  buMETAnide Injectable 2 milliGRAM(s) IV Push once  clopidogrel Tablet 75 milliGRAM(s) Oral daily  dextrose 5%. 1000 milliLiter(s) IV Continuous <Continuous>  dextrose 5%. 1000 milliLiter(s) IV Continuous <Continuous>  dextrose 50% Injectable 25 Gram(s) IV Push once  dextrose 50% Injectable 12.5 Gram(s) IV Push once  dextrose 50% Injectable 25 Gram(s) IV Push once  dextrose Oral Gel 15 Gram(s) Oral once PRN  enoxaparin Injectable 40 milliGRAM(s) SubCutaneous every 24 hours  fenofibrate Tablet 145 milliGRAM(s) Oral daily  gabapentin 300 milliGRAM(s) Oral three times a day  glucagon  Injectable 1 milliGRAM(s) IntraMuscular once  insulin lispro (ADMELOG) corrective regimen sliding scale   SubCutaneous three times a day before meals  levothyroxine 25 MICROGram(s) Oral daily  melatonin 5 milliGRAM(s) Oral at bedtime PRN  metoprolol succinate  milliGRAM(s) Oral daily  oxyCODONE    IR 5 milliGRAM(s) Oral every 4 hours PRN  pantoprazole    Tablet 40 milliGRAM(s) Oral before breakfast  polyethylene glycol 3350 17 Gram(s) Oral daily  senna 2 Tablet(s) Oral at bedtime  sodium chloride 3%. 150 milliLiter(s) IV Continuous <Continuous>  sodium chloride 3%. 150 milliLiter(s) IV Continuous <Continuous>

## 2023-01-24 NOTE — PHYSICAL THERAPY INITIAL EVALUATION ADULT - PERTINENT HX OF CURRENT PROBLEM, REHAB EVAL
57 year old female with PMH of COPD on 4 L home O2, CORNELIUS on CPAP, HFrEF (19% on TTE 02/2022, s/p ICD, pulmonary HTN, CAD s/p PCI to RCA)1, HTN, HLD, CVA with residual LLE weakness, DM, active smoker on home O2, presented to the ED for foot pain. 57 year old female with PMH of COPD on 4 L home O2, CORNELIUS on CPAP, HFrEF, s/p ICD, pulmonary HTN, CAD s/p PCI to RCA)1, HTN, HLD, CVA with residual LLE weakness, DM, active smoker on home O2, presented to the ED for foot pain.

## 2023-01-25 LAB
ALBUMIN SERPL ELPH-MCNC: 3.3 G/DL — LOW (ref 3.5–5.2)
ALP SERPL-CCNC: 85 U/L — SIGNIFICANT CHANGE UP (ref 30–115)
ALT FLD-CCNC: <5 U/L — SIGNIFICANT CHANGE UP (ref 0–41)
ANION GAP SERPL CALC-SCNC: 10 MMOL/L — SIGNIFICANT CHANGE UP (ref 7–14)
ANION GAP SERPL CALC-SCNC: 11 MMOL/L — SIGNIFICANT CHANGE UP (ref 7–14)
AST SERPL-CCNC: 13 U/L — SIGNIFICANT CHANGE UP (ref 0–41)
BASOPHILS # BLD AUTO: 0.07 K/UL — SIGNIFICANT CHANGE UP (ref 0–0.2)
BASOPHILS NFR BLD AUTO: 1.8 % — HIGH (ref 0–1)
BILIRUB SERPL-MCNC: 0.9 MG/DL — SIGNIFICANT CHANGE UP (ref 0.2–1.2)
BUN SERPL-MCNC: 27 MG/DL — HIGH (ref 10–20)
BUN SERPL-MCNC: 29 MG/DL — HIGH (ref 10–20)
CALCIUM SERPL-MCNC: 8.7 MG/DL — SIGNIFICANT CHANGE UP (ref 8.4–10.5)
CALCIUM SERPL-MCNC: 8.9 MG/DL — SIGNIFICANT CHANGE UP (ref 8.4–10.5)
CHLORIDE SERPL-SCNC: 102 MMOL/L — SIGNIFICANT CHANGE UP (ref 98–110)
CHLORIDE SERPL-SCNC: 103 MMOL/L — SIGNIFICANT CHANGE UP (ref 98–110)
CO2 SERPL-SCNC: 24 MMOL/L — SIGNIFICANT CHANGE UP (ref 17–32)
CO2 SERPL-SCNC: 25 MMOL/L — SIGNIFICANT CHANGE UP (ref 17–32)
CREAT SERPL-MCNC: 1.6 MG/DL — HIGH (ref 0.7–1.5)
CREAT SERPL-MCNC: 1.7 MG/DL — HIGH (ref 0.7–1.5)
EGFR: 35 ML/MIN/1.73M2 — LOW
EGFR: 38 ML/MIN/1.73M2 — LOW
EOSINOPHIL # BLD AUTO: 0.06 K/UL — SIGNIFICANT CHANGE UP (ref 0–0.7)
EOSINOPHIL NFR BLD AUTO: 1.5 % — SIGNIFICANT CHANGE UP (ref 0–8)
GLUCOSE BLDC GLUCOMTR-MCNC: 113 MG/DL — HIGH (ref 70–99)
GLUCOSE BLDC GLUCOMTR-MCNC: 128 MG/DL — HIGH (ref 70–99)
GLUCOSE BLDC GLUCOMTR-MCNC: 132 MG/DL — HIGH (ref 70–99)
GLUCOSE BLDC GLUCOMTR-MCNC: 137 MG/DL — HIGH (ref 70–99)
GLUCOSE SERPL-MCNC: 127 MG/DL — HIGH (ref 70–99)
GLUCOSE SERPL-MCNC: 143 MG/DL — HIGH (ref 70–99)
HCT VFR BLD CALC: 30.2 % — LOW (ref 37–47)
HGB BLD-MCNC: 9.3 G/DL — LOW (ref 12–16)
IMM GRANULOCYTES NFR BLD AUTO: 0.5 % — HIGH (ref 0.1–0.3)
LYMPHOCYTES # BLD AUTO: 0.9 K/UL — LOW (ref 1.2–3.4)
LYMPHOCYTES # BLD AUTO: 22.6 % — SIGNIFICANT CHANGE UP (ref 20.5–51.1)
MAGNESIUM SERPL-MCNC: 1.6 MG/DL — LOW (ref 1.8–2.4)
MAGNESIUM SERPL-MCNC: 1.7 MG/DL — LOW (ref 1.8–2.4)
MCHC RBC-ENTMCNC: 27.8 PG — SIGNIFICANT CHANGE UP (ref 27–31)
MCHC RBC-ENTMCNC: 30.8 G/DL — LOW (ref 32–37)
MCV RBC AUTO: 90.4 FL — SIGNIFICANT CHANGE UP (ref 81–99)
MONOCYTES # BLD AUTO: 0.66 K/UL — HIGH (ref 0.1–0.6)
MONOCYTES NFR BLD AUTO: 16.5 % — HIGH (ref 1.7–9.3)
NEUTROPHILS # BLD AUTO: 2.28 K/UL — SIGNIFICANT CHANGE UP (ref 1.4–6.5)
NEUTROPHILS NFR BLD AUTO: 57.1 % — SIGNIFICANT CHANGE UP (ref 42.2–75.2)
NRBC # BLD: 0 /100 WBCS — SIGNIFICANT CHANGE UP (ref 0–0)
PLATELET # BLD AUTO: 168 K/UL — SIGNIFICANT CHANGE UP (ref 130–400)
POTASSIUM SERPL-MCNC: 4.7 MMOL/L — SIGNIFICANT CHANGE UP (ref 3.5–5)
POTASSIUM SERPL-MCNC: 5 MMOL/L — SIGNIFICANT CHANGE UP (ref 3.5–5)
POTASSIUM SERPL-SCNC: 4.7 MMOL/L — SIGNIFICANT CHANGE UP (ref 3.5–5)
POTASSIUM SERPL-SCNC: 5 MMOL/L — SIGNIFICANT CHANGE UP (ref 3.5–5)
PROCALCITONIN SERPL-MCNC: 0.31 NG/ML — HIGH (ref 0.02–0.1)
PROT SERPL-MCNC: 6.4 G/DL — SIGNIFICANT CHANGE UP (ref 6–8)
RBC # BLD: 3.34 M/UL — LOW (ref 4.2–5.4)
RBC # FLD: 19.4 % — HIGH (ref 11.5–14.5)
SODIUM SERPL-SCNC: 137 MMOL/L — SIGNIFICANT CHANGE UP (ref 135–146)
SODIUM SERPL-SCNC: 138 MMOL/L — SIGNIFICANT CHANGE UP (ref 135–146)
WBC # BLD: 3.99 K/UL — LOW (ref 4.8–10.8)
WBC # FLD AUTO: 3.99 K/UL — LOW (ref 4.8–10.8)

## 2023-01-25 PROCEDURE — 99233 SBSQ HOSP IP/OBS HIGH 50: CPT

## 2023-01-25 RX ORDER — MAGNESIUM OXIDE 400 MG ORAL TABLET 241.3 MG
400 TABLET ORAL ONCE
Refills: 0 | Status: COMPLETED | OUTPATIENT
Start: 2023-01-25 | End: 2023-01-25

## 2023-01-25 RX ORDER — POLYETHYLENE GLYCOL 3350 17 G/17G
17 POWDER, FOR SOLUTION ORAL
Refills: 0 | Status: DISCONTINUED | OUTPATIENT
Start: 2023-01-25 | End: 2023-02-04

## 2023-01-25 RX ORDER — SENNA PLUS 8.6 MG/1
2 TABLET ORAL AT BEDTIME
Refills: 0 | Status: DISCONTINUED | OUTPATIENT
Start: 2023-01-25 | End: 2023-02-04

## 2023-01-25 RX ORDER — MORPHINE SULFATE 50 MG/1
2 CAPSULE, EXTENDED RELEASE ORAL EVERY 4 HOURS
Refills: 0 | Status: DISCONTINUED | OUTPATIENT
Start: 2023-01-25 | End: 2023-01-26

## 2023-01-25 RX ORDER — MAGNESIUM SULFATE 500 MG/ML
2 VIAL (ML) INJECTION ONCE
Refills: 0 | Status: COMPLETED | OUTPATIENT
Start: 2023-01-25 | End: 2023-01-25

## 2023-01-25 RX ADMIN — Medication 100 MILLIGRAM(S): at 06:59

## 2023-01-25 RX ADMIN — OXYCODONE HYDROCHLORIDE 5 MILLIGRAM(S): 5 TABLET ORAL at 06:59

## 2023-01-25 RX ADMIN — MORPHINE SULFATE 2 MILLIGRAM(S): 50 CAPSULE, EXTENDED RELEASE ORAL at 13:14

## 2023-01-25 RX ADMIN — POLYETHYLENE GLYCOL 3350 17 GRAM(S): 17 POWDER, FOR SOLUTION ORAL at 17:26

## 2023-01-25 RX ADMIN — Medication 25 GRAM(S): at 18:01

## 2023-01-25 RX ADMIN — BUDESONIDE AND FORMOTEROL FUMARATE DIHYDRATE 2 PUFF(S): 160; 4.5 AEROSOL RESPIRATORY (INHALATION) at 22:22

## 2023-01-25 RX ADMIN — ATORVASTATIN CALCIUM 80 MILLIGRAM(S): 80 TABLET, FILM COATED ORAL at 22:21

## 2023-01-25 RX ADMIN — CLOPIDOGREL BISULFATE 75 MILLIGRAM(S): 75 TABLET, FILM COATED ORAL at 12:06

## 2023-01-25 RX ADMIN — MORPHINE SULFATE 2 MILLIGRAM(S): 50 CAPSULE, EXTENDED RELEASE ORAL at 22:21

## 2023-01-25 RX ADMIN — MORPHINE SULFATE 2 MILLIGRAM(S): 50 CAPSULE, EXTENDED RELEASE ORAL at 17:34

## 2023-01-25 RX ADMIN — MORPHINE SULFATE 2 MILLIGRAM(S): 50 CAPSULE, EXTENDED RELEASE ORAL at 15:05

## 2023-01-25 RX ADMIN — Medication 145 MILLIGRAM(S): at 12:06

## 2023-01-25 RX ADMIN — BUMETANIDE 5 MG/HR: 0.25 INJECTION INTRAMUSCULAR; INTRAVENOUS at 10:11

## 2023-01-25 RX ADMIN — GABAPENTIN 100 MILLIGRAM(S): 400 CAPSULE ORAL at 06:59

## 2023-01-25 RX ADMIN — GABAPENTIN 100 MILLIGRAM(S): 400 CAPSULE ORAL at 17:26

## 2023-01-25 RX ADMIN — SENNA PLUS 2 TABLET(S): 8.6 TABLET ORAL at 22:22

## 2023-01-25 RX ADMIN — OXYCODONE HYDROCHLORIDE 5 MILLIGRAM(S): 5 TABLET ORAL at 02:48

## 2023-01-25 RX ADMIN — MAGNESIUM OXIDE 400 MG ORAL TABLET 400 MILLIGRAM(S): 241.3 TABLET ORAL at 19:00

## 2023-01-25 RX ADMIN — PANTOPRAZOLE SODIUM 40 MILLIGRAM(S): 20 TABLET, DELAYED RELEASE ORAL at 06:59

## 2023-01-25 RX ADMIN — Medication 25 MICROGRAM(S): at 06:58

## 2023-01-25 RX ADMIN — ENOXAPARIN SODIUM 40 MILLIGRAM(S): 100 INJECTION SUBCUTANEOUS at 06:59

## 2023-01-25 RX ADMIN — OXYCODONE HYDROCHLORIDE 5 MILLIGRAM(S): 5 TABLET ORAL at 11:33

## 2023-01-25 NOTE — ASSESSMENT
[FreeTextEntry1] : ## Hx of sustained VT\par ## Nonischemic cardiomyopathy \par ## LBBB\par \par - LBBB, NYHA-3, QRSd 130.\par - I discussed the risks, benefits and alternatives for device implantation.  \par I reported a risk of major complications at 1% and minor complications at 3%. The details of the procedure and risks associated with undergoing the procedure were discussed in detail including but not limited to, death, myocardial ischemia, stroke, cardiac perforation, potential need for temporary pacemaker implantation, lung damage requiring chest tube (pneumothorax), blood clot, blood collection requiring blood transfusion or repeat surgery (hematoma), infection, or vascular injury.  We discussed that the coronary sinus anatomy is not suitable for a CS LV lead in about 10% of patients and if this is the case, we would not be able to place the lead. All questions were answered to satisfaction and the patient expressed verbal understanding of the procedure, the benefits, and risks. The patient agreed for the procedure.\par \par - Offered in-office lasix. Doesnt want to stay\par - HF -fup\par - Suggested ER visit. Doesnt want to do that\par - Will admit ~ 5 days ago for fluid optimization\par - Will be planned for right sided CRT placement\par \par

## 2023-01-25 NOTE — PROGRESS NOTE ADULT - ASSESSMENT
57 year old female with PMH of COPD on 4 L home O2, CORNELIUS on CPAP, HFrEF (19% on TTE 02/2022, s/p ICD, pulmonary HTN, CAD s/p PCI to RCA)1, HTN, HLD, CVA with residual LLE weakness, DM, active smoker on home O2 4L NC, presented to the ED for foot pain.  Patient does not know any of her meds or doses;  Comes from [facility] associated with  severe lower extremity edema due to acute on chronic HFrEF (19%)  FLuid Overload  medication noncompliance   - remains fluid overloaded clinically   - Appreciate HF Consult -> Hypertonic Saline and Bumex Drip   - f/u K/Mg/Cr daily   * pt was on torsemide 20mg po daily at home, holding home spironolactone   - Hold spironolactone home dose  - Cont entresto   - Cont metoprolol   - EP consult appreciated: plan for ICD per Dr Soto, continue with telemetry monitoring for now    Severe bilateral PAD  CAD s/p PCI / HTN   History of CVA  - Arterial duplex:  a) No flow is visualized in distal right SFA and proximal and mid left SFA.  b) Reconstitution in the popliteal artery bilaterally with diminished flow in tibial arteries.  -vascular cardiology on board, recommend cross sectional imaging once renal fxn improves  - c/w statin and plavix     AICD Site Infection PRIOR h/x   - now removed, site looks clean   - CT chest reviewed not suggestive of collection in lt chest/breast area  - Blood cx :Staph haemolyticus in one bottle Likely contamination.    - f/u Repeat blood cxs as recommended by ID   - will need ICD placement with EP    hx of chronic hypoxic resp failure on 4 liter oxygen  due to COPD - stable   CORNELIUS  - Cont inhalers, CPAP     JEMIMA 2/2 HFrEF Exacerbation/diuresis   - baseline 1.3-1.4  - c/w diuresis per HF recs   - f/u Cr daily if worsens get Nephro consult f/u all electrolytes daily   - I/O's monitoring      DM-II   Suspected diabetic neuropathy  - hold home farxiga and metformin   - fingersticks and insulin   - started on gabapentin      Chronic Normocytic Anemia   - Hb 10.3; can f/u Fe studies     Shoulder pain - likely from immobility - not responsive to oxycodone  - trial of morphine. Ensure bowel regimen    Overall prognosis poor given chronic medical conditions    Pending: diuresis, repeat blood cx, ICD and vascular cardiology f/u, pain control   Discussed with patient

## 2023-01-25 NOTE — PROGRESS NOTE ADULT - SUBJECTIVE AND OBJECTIVE BOX
WILLIAN MARY  57y, Female  Allergy: No Known Allergies      LOS  6d    CHIEF COMPLAINT: sob (24 Jan 2023 16:35)      INTERVAL EVENTS/HPI  - No acute events overnight  - T(F): , Max: 98 (01-24-23 @ 20:14)  - Denies any worsening symptoms  - Tolerating medication  - WBC Count: 3.99 (01-25-23 @ 06:46)  WBC Count: 3.49 (01-24-23 @ 04:43)     - Creatinine, Serum: 1.6 (01-25-23 @ 06:46)  Creatinine, Serum: 1.6 (01-24-23 @ 16:30)       ROS  General: Denies rigors, nightsweats  HEENT: Denies headache, rhinorrhea, sore throat, eye pain  CV: Denies CP, palpitations  PULM: Denies wheezing, hemoptysis  GI: Denies hematemesis, hematochezia, melena  : Denies discharge, hematuria  MSK: Denies arthralgias, myalgias  SKIN: Denies rash, lesions  NEURO: Denies paresthesias, weakness  PSYCH: Denies depression, anxiety    VITALS:  T(F): 96.1, Max: 98 (01-24-23 @ 20:14)  HR: 797  BP: 99/60  RR: 18Vital Signs Last 24 Hrs  T(C): 35.6 (25 Jan 2023 13:04), Max: 36.7 (24 Jan 2023 20:14)  T(F): 96.1 (25 Jan 2023 13:04), Max: 98 (24 Jan 2023 20:14)  HR: 797 (25 Jan 2023 13:04) (76 - 797)  BP: 99/60 (25 Jan 2023 13:04) (95/55 - 113/75)  BP(mean): --  RR: 18 (25 Jan 2023 13:04) (18 - 18)  SpO2: --        PHYSICAL EXAM:  Gen: NAD, resting in bed  HEENT: Normocephalic, atraumatic  Neck: supple, no lymphadenopathy  CV: Regular rate & regular rhythm ICD site tender , edema  Lungs: decreased BS at bases, no fremitus  Abdomen: Soft, BS present  Ext: Warm, well perfused  Neuro: non focal, awake  Skin: no rash, no erythema  Lines: no phlebitis    FH: Non-contributory  Social Hx: Non-contributory    TESTS & MEASUREMENTS:                        9.3    3.99  )-----------( 168      ( 25 Jan 2023 06:46 )             30.2     01-25    138  |  103  |  27<H>  ----------------------------<  143<H>  4.7   |  24  |  1.6<H>    Ca    8.7      25 Jan 2023 06:46  Mg     1.7     01-25    TPro  6.4  /  Alb  3.3<L>  /  TBili  0.9  /  DBili  x   /  AST  13  /  ALT  <5  /  AlkPhos  85  01-25      LIVER FUNCTIONS - ( 25 Jan 2023 06:46 )  Alb: 3.3 g/dL / Pro: 6.4 g/dL / ALK PHOS: 85 U/L / ALT: <5 U/L / AST: 13 U/L / GGT: x               Culture - Blood (collected 01-20-23 @ 18:51)  Source: .Blood Blood  Preliminary Report (01-22-23 @ 01:02):    No growth to date.    Culture - Blood (collected 01-18-23 @ 21:11)  Source: .Blood Blood-Peripheral  Final Report (01-24-23 @ 07:00):    No Growth Final    Culture - Blood (collected 01-18-23 @ 21:11)  Source: .Blood Blood-Peripheral  Gram Stain (01-20-23 @ 06:08):    Growth in aerobic bottle: Gram positive cocci in pairs  Final Report (01-20-23 @ 22:23):    Growth in aerobic bottle: Staphylococcus haemolyticus    Coag Negative Staphylococcus    Single set isolate, possible contaminant. Contact    Microbiology if susceptibility testing clinically    indicated.    ***Blood Panel PCR results on this specimen are available    approximately 3 hours after the Gram stain result.***    Gram stain, PCR, and/or culture results may not always    correspond due to difference in methodologies.    ************************************************************    This PCR assay was performed by multiplex PCR. This    Assay tests for 66 bacterial and resistance gene targets.    Please refer to the [facility] test directory    at [email or web address] for details.  Organism: Blood Culture PCR (01-20-23 @ 22:23)  Organism: Blood Culture PCR (01-20-23 @ 22:23)      -  Coagulase negative Staphylococcus: Detec      Method Type: PCR            INFECTIOUS DISEASES TESTING  Vancomycin Level, Trough: 16.2 (11-26-22 @ 06:33)  Vancomycin Level, Trough: 20.2 (11-24-22 @ 15:57)  Vancomycin Level, Trough: 31.3 (11-23-22 @ 18:09)  Vancomycin Level, Trough: 37.7 (11-23-22 @ 05:34)  Vancomycin Level, Trough: 43.7 (11-22-22 @ 17:21)  COVID-19 PCR: NotDetec (11-22-22 @ 15:30)  Vancomycin Level, Trough: 31.6 (11-21-22 @ 06:02)  COVID-19 PCR: NotDetec (11-18-22 @ 23:46)  COVID-19 PCR: NotDetec (04-03-22 @ 04:30)  Procalcitonin, Serum: 0.11 (03-29-22 @ 07:30)  Procalcitonin, Serum: 0.11 (03-28-22 @ 15:50)  COVID-19 PCR: NotDetec (03-28-22 @ 05:10)  COVID-19 PCR: NotDetec (03-21-22 @ 21:31)  COVID-19 PCR: Detected (02-13-22 @ 04:30)  COVID-19 PCR: NotDetec (02-06-22 @ 10:50)      INFLAMMATORY MARKERS  Sedimentation Rate, Erythrocyte: 8 mm/Hr (01-19-23 @ 04:30)  C-Reactive Protein, Serum: 12.3 mg/L (01-19-23 @ 04:30)      RADIOLOGY & ADDITIONAL TESTS:  I have personally reviewed the last available Chest xray  CXR      CT  CT Chest No Cont:   ACC: [id number] EXAM:  CT CHEST   ORDERED BY: CHRISTINA PETERSON     PROCEDURE DATE:  01/24/2023          INTERPRETATION:  REASON FOR STUDY: Possible ICD explant site abscess    TECHNIQUE: : Noncontrast CT scan of the thorax was performed from lung   apices to adrenal glands.  Sagittal and coronal reformatted images were generated.      COMPARISON: CT chest-12/3/2022      FINDINGS:    TUBES/LINES/DEVICES: none  MEDIASTINUM/HILUM : No adenopathy or mass.  CHEST WALL/ BREASTS: Extensive soft tissue swelling left chest wall,   involving adjacent breast is noted, similar to previous study.  No discrete fluid collections or air bubbles.  Increasing amounts of anasarca throughout the thorax are also noted.    HEART/ GREAT VESSELS:Stable pericardial effusion and cardiomegaly.   Extensive coronary artery calcifications.    ABDOMEN: Limited assessment due to lack of IV contrast and oral contrast.    BONES/ SOFT TISSUES: Stable degenerative changes thoracic spine.    LUNGS/PLEURA/AIRWAYS:   Patent central tracheobronchial tree. Stable trace bilateral effusions.    Bilateral groundglass opacities with septal thickening is noted   suggesting interstitial edema. No masses or lobar consolidation. There is   no evidence of pneumothorax.    PULMONARY NODULES:  No suspicious pulmonary nodules.      IMPRESSION:    Since  12/3/2022    Extensive soft tissue swelling left chest wall, involving adjacent breast   is noted, similar to previous study.  No discrete fluid collections or air bubbles.      Increasing amounts of anasarca throughout the thorax are also noted.    Stable pericardial effusion widest diameter 2.3 cm.      Bilateral groundglass opacities with septal thickening is noted   suggesting interstitial edema    --- End of Report ---            MADDY QUESADA MD; Attending Radiologist  This document has been electronically signed. Jan 24 2023  2:15PM (01-24-23 @ 11:26)      CARDIOLOGY TESTING  12 Lead ECG:   Ventricular Rate 112 BPM    Atrial Rate 112 BPM    P-R Interval 172 ms    QRS Duration 134 ms    Q-T Interval 356 ms    QTC Calculation(Bazett) 485 ms    P Axis 54 degrees    R Axis 202 degrees    T Axis 51 degrees    Diagnosis Line Sinus tachycardia  Non-specific intra-ventricular conduction block  Lateral infarct , age undetermined  Abnormal ECG    Confirmed by Asad Buitrago ([id number]) on 1/19/2023 8:04:59 AM (01-18-23 @ 21:36)      MEDICATIONS  atorvastatin 80 Oral at bedtime  budesonide 160 MICROgram(s)/formoterol 4.5 MICROgram(s) Inhaler 2 Inhalation two times a day  buMETAnide Infusion 1 IV Continuous <Continuous>  clopidogrel Tablet 75 Oral daily  dextrose 5%. 1000 IV Continuous <Continuous>  dextrose 5%. 1000 IV Continuous <Continuous>  dextrose 50% Injectable 25 IV Push once  dextrose 50% Injectable 12.5 IV Push once  dextrose 50% Injectable 25 IV Push once  enoxaparin Injectable 40 SubCutaneous every 24 hours  fenofibrate Tablet 145 Oral daily  gabapentin 100 Oral two times a day  glucagon  Injectable 1 IntraMuscular once  insulin lispro (ADMELOG) corrective regimen sliding scale  SubCutaneous three times a day before meals  levothyroxine 25 Oral daily  metoprolol succinate  Oral daily  pantoprazole    Tablet 40 Oral before breakfast  polyethylene glycol 3350 17 Oral two times a day  senna 2 Oral at bedtime  senna 2 Oral at bedtime  sodium chloride 3%. 150 IV Continuous <Continuous>  sodium chloride 3%. 150 IV Continuous <Continuous>  sodium chloride 3%. 150 IV Continuous <Continuous>  sodium zirconium cyclosilicate 10 Oral once      WEIGHT  Weight (kg): 40.823 (11-30-22 @ 15:51)  Creatinine, Serum: 1.6 mg/dL (01-25-23 @ 06:46)  Creatinine, Serum: 1.6 mg/dL (01-24-23 @ 16:30)      ANTIBIOTICS:      All available historical records have been reviewed

## 2023-01-25 NOTE — PHYSICAL EXAM
[No Acute Distress] : no acute distress [Ill-Appearing] : ill-appearing [Normal] : normal S1, S2, no murmur, no rub, no gallop

## 2023-01-25 NOTE — HISTORY OF PRESENT ILLNESS
[FreeTextEntry1] : Ms. MARY is a 57 year-year old female with history of COPD on home O2, Nonischemic cardiomyopathy, pulmonary hypertension secondary to HFrEF, CAD/PCI, HTN, DL, CVA, sustained AT s/p ablation (Dr. Taylor), s/p CRT-D --> extracted for pocket infection, GERD, DM is here for Nonischemic cardiomyopathy.\par \par + Accompanied by aide.\par \par + ORR\par \par Denies chest pain, shortness of breath, palpitation, dizziness or LOC except noted above. \par \par EKG (1/4/23): SR-LBBB, QRSd 130, \par TTE (11/22): EF  20-25%, Mod LAE/ZOE/RV dysfunction/LVH/MR/TR, Mod pericardial effusion\par Cardio :Dr. Ge\par

## 2023-01-25 NOTE — PROGRESS NOTE ADULT - ASSESSMENT
Assessment: 57 year old female with PMH of COPD on 4 L home O2, CORNELIUS on CPAP, HFrEF, s/p ICD, pulmonary HTN, CAD s/p PCI to RCA)1, HTN, HLD, CVA with residual LLE weakness, DM, active smoker on home O2, presented to the ED for foot pain. Patient admitted for bilateral LE edema, CHF Exacerbation vs. Cellulitis. Heart failure team consulted to assist with fluid overload/acute decompensated heart failure management in the setting of JEMIMA.     Plan:  Patient remains grossly fluid overloaded on exam  Continue Bumex gtt at 1 mg/hr  Continue 3% Hypertonic Saline 150ml BID (If infused throughout a central line, run over one hour, if a peripheral line is to be used run over 3 hours)  Continue home BB  Hold home Entresto and spironolactone until hyperkalemia resolves  Get BMP twice daily with magnesium    Mag 1.7- replete  Maintain potassium >4.0, Mg >2.2  Strict intake and output  Daily weight   Obtain Iron profile- still not done  EP follow-up for ICD placement  Will continue to follow Assessment: 57 year old female with PMH of COPD on 4 L home O2, CORNELIUS on CPAP, HFrEF, s/p ICD, pulmonary HTN, CAD s/p PCI to RCA)1, HTN, HLD, CVA with residual LLE weakness, DM, active smoker on home O2, presented to the ED for foot pain. Patient admitted for bilateral LE edema, CHF Exacerbation vs. Cellulitis. Heart failure team consulted to assist with fluid overload/acute decompensated heart failure management in the setting of JEMIMA.     Plan:  Patient remains grossly fluid overloaded on exam  Continue Bumex gtt at 1 mg/hr  Continue 3% Hypertonic Saline 150ml BID (If infused throughout a central line, run over one hour, if a peripheral line is to be used run over 3 hours)  Continue home BB  Hold home Entresto and spironolactone until hyperkalemia resolves  Get BMP twice daily with magnesium    Mag 1.7- replete  Maintain potassium >4.0, Mg >2.2  Strict intake and output  Daily weight   Obtain Iron profile- still not done  EP follow-up for ICD placement  Will continue to follow.

## 2023-01-25 NOTE — PROGRESS NOTE ADULT - SUBJECTIVE AND OBJECTIVE BOX
USMAN WILLIAN  57y Female    CHIEF COMPLAINT:    Patient is a 57y old  Female who presents with a chief complaint of sob (24 Jan 2023 16:35)    INTERVAL HPI/OVERNIGHT EVENTS:    Patient seen and examined. No acute events overnight. on 4L Nc, complains of shoulder pain     ROS: All other systems are negative.    Vital Signs:    T(F): 96.9 (01-25-23 @ 16:00), Max: 98 (01-24-23 @ 20:14)  HR: 80 (01-25-23 @ 16:00) (76 - 797)  BP: 106/58 (01-25-23 @ 16:00) (95/55 - 113/75)  RR: 18 (01-25-23 @ 16:00) (18 - 18)    24 Jan 2023 07:01  -  25 Jan 2023 07:00  --------------------------------------------------------  IN: 185 mL / OUT: 2225 mL / NET: -2040 mL    25 Jan 2023 07:01  -  25 Jan 2023 16:18  --------------------------------------------------------  IN: 650 mL / OUT: 1901 mL / NET: -1251 mL    POCT Blood Glucose.: 113 mg/dL (25 Jan 2023 12:00)  POCT Blood Glucose.: 132 mg/dL (25 Jan 2023 07:37)  POCT Blood Glucose.: 141 mg/dL (24 Jan 2023 21:30)  POCT Blood Glucose.: 125 mg/dL (24 Jan 2023 16:40)    PHYSICAL EXAM:    GENERAL:  NAD chronically ill appearing   SKIN: No rashes or lesions  HEENT: Atraumatic. Normocephalic.   NECK: Supple, No JVD.    PULMONARY: decreased breath sounds B/L. No wheezing. No rales  CVS: Normal S1, S2. Rate and Rhythm are regular   ABDOMEN/GI: Soft, Nontender, Nondistended   MSK:  diffuse anasarca   NEUROLOGIC: moves all extremities   PSYCH: Alert & oriented x 3, normal affect    Consultant(s) Notes Reviewed:  [x ] YES  [ ] NO  Care Discussed with Consultants/Other Providers [ x] YES  [ ] NO    LABS:                        9.3    3.99  )-----------( 168      ( 25 Jan 2023 06:46 )             30.2     138  |  103  |  27<H>  ----------------------------<  143<H>  4.7   |  24  |  1.6<H>    Ca    8.7      25 Jan 2023 06:46  Mg     1.7     01-25    TPro  6.4  /  Alb  3.3<L>  /  TBili  0.9  /  DBili  x   /  AST  13  /  ALT  <5  /  AlkPhos  85  01-25    Serum Pro-Brain Natriuretic Peptide: 04678 pg/mL (01-23-23 @ 06:11)  Serum Pro-Brain Natriuretic Peptide: 8880 pg/mL (01-19-23 @ 04:30)    RADIOLOGY & ADDITIONAL TESTS:  Imaging or report Personally Reviewed:  [x] YES  [ ] NO  EKG reviewed: [x] YES  [ ] NO    Medications:  Standing  atorvastatin 80 milliGRAM(s) Oral at bedtime  budesonide 160 MICROgram(s)/formoterol 4.5 MICROgram(s) Inhaler 2 Puff(s) Inhalation two times a day  buMETAnide Infusion 1 mG/Hr IV Continuous <Continuous>  clopidogrel Tablet 75 milliGRAM(s) Oral daily  dextrose 5%. 1000 milliLiter(s) IV Continuous <Continuous>  dextrose 5%. 1000 milliLiter(s) IV Continuous <Continuous>  dextrose 50% Injectable 25 Gram(s) IV Push once  dextrose 50% Injectable 12.5 Gram(s) IV Push once  dextrose 50% Injectable 25 Gram(s) IV Push once  enoxaparin Injectable 40 milliGRAM(s) SubCutaneous every 24 hours  fenofibrate Tablet 145 milliGRAM(s) Oral daily  gabapentin 100 milliGRAM(s) Oral two times a day  glucagon  Injectable 1 milliGRAM(s) IntraMuscular once  insulin lispro (ADMELOG) corrective regimen sliding scale   SubCutaneous three times a day before meals  levothyroxine 25 MICROGram(s) Oral daily  metoprolol succinate  milliGRAM(s) Oral daily  pantoprazole    Tablet 40 milliGRAM(s) Oral before breakfast  polyethylene glycol 3350 17 Gram(s) Oral two times a day  senna 2 Tablet(s) Oral at bedtime  senna 2 Tablet(s) Oral at bedtime  sodium chloride 3%. 150 milliLiter(s) IV Continuous <Continuous>  sodium chloride 3%. 150 milliLiter(s) IV Continuous <Continuous>  sodium chloride 3%. 150 milliLiter(s) IV Continuous <Continuous>  sodium zirconium cyclosilicate 10 Gram(s) Oral once    PRN Meds  dextrose Oral Gel 15 Gram(s) Oral once PRN  melatonin 5 milliGRAM(s) Oral at bedtime PRN  morphine  - Injectable 2 milliGRAM(s) IV Push every 4 hours PRN  oxyCODONE    IR 5 milliGRAM(s) Oral every 4 hours PRN

## 2023-01-25 NOTE — PROGRESS NOTE ADULT - SUBJECTIVE AND OBJECTIVE BOX
Date of Admission: 23    Interval History: Patient resting in bed, in NAD. C/o burning/discomfort to B/L feet and anterior chest wall since last night, unable to sleep 2/2 to pain, requesting pain medication. Receiving Bumex gtt and 3% hypertonic saline. Kidney function stable, 2.2L 24hr UOP. Remains overloaded.     Chief Complaint: Patient is a 57y old  Female who presents with a chief complaint of sob (2023 14:03)    HISTORY OF PRESENT ILLNESS: 57 year old female with PMH of COPD on 4 L home O2, CORNELIUS on CPAP, HFrEF (19% on TTE 2022, s/p ICD, pulmonary HTN, CAD s/p PCI to RCA)1, HTN, HLD, CVA with residual LLE weakness, DM, active smoker on home O2, presented to the ED for foot pain.   She reports that pain started in bilateral legs two days ago. She also noticed increased swelling in both legs. Pain was so intense, she could no longer ambulate or touch her feet. Symptoms are worse on her L leg. She also reports that her chronic shortness of breath has been worse than usual. She also endorses that she generally feels more swollen than usual.   Patient admitted for bilateral LE edema, CHF Exacerbation vs. Cellulitis (less likely)   Patient does not know any of her meds or doses; verify with [employer] in the AM. Meds started using surescripts. (2023 03:19)      Patient well known to heart failure team from multiple previous admissions. Patient reports progressively worsening LE edema and tenderness x1 week. Reports her breathing is baseline (mild SOB 2/2 COPD). Endorses home medication compliance but is unaware of which medications she takes. She believes she is maintaining a low Na diet at home, however, she stated she orders Chinese food often. Denies chest pain, palpitations, n/v. +early satiety +decrease in appetite +cough.        PAST MEDICAL & SURGICAL HISTORY:  Hypertension  Hyperlipidemia  Anxiety and depression  COPD, severe  CHF (congestive heart failure)  Cerebrovascular accident (CVA)Multiple  Type 2 diabetes mellitus  CKD (chronic kidney disease), stage II  No significant past surgical history        FAMILY HISTORY:  No pertinent family history of premature cardiovascular disease in first degree relatives.  Mother:  of cancer at 65  Father:  of an overdose at 50    SOCIAL HISTORY: Former smoker- states she quit 1 week ago. Denies alcohol or drug use.       Allergies  No Known Allergies    Intolerances      PHYSICAL EXAM:  General Appearance: normal for age and gender. 	  Neck: unable to assess due to body habitus  Eyes: Extra Ocular muscles intact.   Cardiovascular: regular rate and rhythm S1 S2, , No murmurs,+2-3 B/L edema up to thighs  Respiratory: +crackles   Psychiatry: Alert and oriented x 3, Mood & affect appropriate  Gastrointestinal:  Soft, Non-tender  Skin/Integumentary : No rashes, No ecchymoses, No cyanosis	  Neurologic: Non-focal  Musculoskeletal/ extremities: Normal range of motion, No clubbing, cyanosis   Vascular: Peripheral pulses palpable 2+ bilaterally    CARDIAC MARKERS:  Serum Pro-Brain Natriuretic Peptide: 4892 pg/mL (22 @ 10:50)      TELEMETRY EVENTS: 	    EC23    Ventricular Rate 112 BPM  Atrial Rate 112 BPM  P-R Interval 172 ms  QRS Duration 134 ms  Q-T Interval 356 ms  QTC Calculation(Bazett) 485 ms  P Axis 54 degrees  R Axis 202 degrees  T Axis 51 degrees    Diagnosis Line Sinus tachycardia  Non-specific intra-ventricular conduction block  Lateral infarct , age undetermined  Abnormal ECG    Confirmed by Asad Buitrago ([id number]) on 2023 8:04:59 AM        PREVIOUS DIAGNOSTIC TESTING:      TTE 22      Summary:   1. Moderately decreased global left ventricular systolic function with   estimated EF of 25-30% with increased wall thickness.   2. The left ventricular diastolic function could not be assessed in this   study.   3. Mildly enlarged right ventricle (RVEDD = 4.3cm) with moderately   reduced function.   4. Diastolic septal wall flattening consistent with RV volume overload.   5. Mild biatrial enlargement.   6. Mitral valve with bileaflet tethering and resultant mild   regurgitation.   7. Severe tricuspid regurgitation with hepatic vein systolic flow   reversal.   8. Sclerotic aortic valve with normal opening and mild regurgitation.   9. Severe pulmonary hypertension. PASP is at least 65mmHg; this may be   underestimated due to severity of tricuspid regurgitation.  10. Moderate sized pericardial effusion localized posterior tothe right   atrium without echocardiographic evidence of tamponade physiology.  11. Compared to prior study, pericardial effusion size is stable;   findings are overall similar (pHTN severity was likely underestimated on   prior study 2/2 severe TR).    	    Home Medications:  Albuterol (Eqv-ProAir HFA) 90 mcg/inh inhalation aerosol: 2 puff(s) inhaled every 6 hours, As Needed (2021 11:22)  aspirin 81 mg oral tablet: 1 tab(s) orally once a day (2021 11:22)  fenofibrate 145 mg oral tablet: 1 tab(s) orally once a day (2021 11:22)  glipiZIDE 10 mg oral tablet: 1 tab(s) orally 2 times a day (2021 11:22)  Lovaza 1000 mg oral capsule: 2 cap(s) orally 2 times a day (2021 14:52)  metFORMIN 1000 mg oral tablet: 1 tab(s) orally 2 times a day Do not take until  (2021 12:58)  Plavix 75 mg oral tablet: 1 tab(s) orally once a day (2021 11:22)  Vitamin D2 1.25 mg (50,000 intl units) oral capsule: 1 cap(s) orally once a week (2021 14:52)    MEDICATIONS  (STANDING):  aspirin  chewable 81 milliGRAM(s) Oral daily  atorvastatin 80 milliGRAM(s) Oral at bedtime  budesonide 160 MICROgram(s)/formoterol 4.5 MICROgram(s) Inhaler 2 Puff(s) Inhalation two times a day  chlorhexidine 4% Liquid 1 Application(s) Topical <User Schedule>  clopidogrel Tablet 75 milliGRAM(s) Oral daily  enoxaparin Injectable 40 milliGRAM(s) SubCutaneous daily  fenofibrate Tablet 145 milliGRAM(s) Oral daily  furosemide   Injectable 40 milliGRAM(s) IV Push two times a day  magnesium sulfate  IVPB 2 Gram(s) IV Intermittent once  metoprolol succinate ER 50 milliGRAM(s) Oral daily  omega-3-Acid Ethyl Esters 2 Gram(s) Oral two times a day  pantoprazole    Tablet 40 milliGRAM(s) Oral before breakfast  sacubitril 49 mG/valsartan 51 mG 1 Tablet(s) Oral two times a day    MEDICATIONS  (PRN):  ALBUTerol    90 MICROgram(s) HFA Inhaler 2 Puff(s) Inhalation every 6 hours PRN Shortness of Breath and/or Wheezing

## 2023-01-25 NOTE — PROGRESS NOTE ADULT - ASSESSMENT
ASSESSMENT  57 year old female with PMH of COPD on 4 L home O2, CORNELIUS on CPAP, HFrEF (19% on TTE 02/2022, s/p ICD, pulmonary HTN, CAD s/p PCI to RCA)1, HTN, HLD, CVA with residual LLE weakness, DM, active smoker on home O2, presented to the ED for foot pain.   ID consulted for +BCX    IMPRESSION  #Hx explant ICD due to pocket abscess    1/20 BCX NGTD     1/18 BCX 1/2 bottles Staphylococcus haemolyticus    1/18 BCX NGTD     CT Since  12/3/2022  Extensive soft tissue swelling left chest wall, involving adjacent breast is noted, similar to previous study.  No discrete fluid collections or air bubbles.  11/23 OR Cultures NGTD  11/19 1/3 BCx Staphylococcus haemolyticus/ Strep   11/21,22 BCx NGTD  #CKD  Creatinine, Serum: 1.8 (01-23-23 @ 06:11)    Weight (kg): 40.823 (11-30-22 @ 15:51)    RECOMMENDATIONS   -L chest site with edema/erythema and + BCX despite in only 1/4 bottles is the same coNS that previously grew 11/2022. CT Chest with soft tissue swelling  - f/u pending BCX  - Discuss with EP that no need for re-exploration of the site  - TTE   - If next set of BCX NG, cleared for procedure  - Monitor off antibiotics    If any questions, please call or send a message on [employer] Teams  Please continue to update ID with any pertinent new laboratory or radiographic findings  #[id number]

## 2023-01-26 LAB
ALBUMIN SERPL ELPH-MCNC: 3.5 G/DL — SIGNIFICANT CHANGE UP (ref 3.5–5.2)
ALP SERPL-CCNC: 68 U/L — SIGNIFICANT CHANGE UP (ref 30–115)
ALT FLD-CCNC: <5 U/L — SIGNIFICANT CHANGE UP (ref 0–41)
ANION GAP SERPL CALC-SCNC: 10 MMOL/L — SIGNIFICANT CHANGE UP (ref 7–14)
ANION GAP SERPL CALC-SCNC: 8 MMOL/L — SIGNIFICANT CHANGE UP (ref 7–14)
AST SERPL-CCNC: 15 U/L — SIGNIFICANT CHANGE UP (ref 0–41)
BASOPHILS # BLD AUTO: 0.07 K/UL — SIGNIFICANT CHANGE UP (ref 0–0.2)
BASOPHILS NFR BLD AUTO: 1.5 % — HIGH (ref 0–1)
BILIRUB SERPL-MCNC: 1.1 MG/DL — SIGNIFICANT CHANGE UP (ref 0.2–1.2)
BUN SERPL-MCNC: 31 MG/DL — HIGH (ref 10–20)
BUN SERPL-MCNC: 33 MG/DL — HIGH (ref 10–20)
CALCIUM SERPL-MCNC: 8.7 MG/DL — SIGNIFICANT CHANGE UP (ref 8.4–10.4)
CALCIUM SERPL-MCNC: 9 MG/DL — SIGNIFICANT CHANGE UP (ref 8.4–10.5)
CHLORIDE SERPL-SCNC: 101 MMOL/L — SIGNIFICANT CHANGE UP (ref 98–110)
CHLORIDE SERPL-SCNC: 103 MMOL/L — SIGNIFICANT CHANGE UP (ref 98–110)
CO2 SERPL-SCNC: 26 MMOL/L — SIGNIFICANT CHANGE UP (ref 17–32)
CO2 SERPL-SCNC: 27 MMOL/L — SIGNIFICANT CHANGE UP (ref 17–32)
CREAT SERPL-MCNC: 1.6 MG/DL — HIGH (ref 0.7–1.5)
CREAT SERPL-MCNC: 1.6 MG/DL — HIGH (ref 0.7–1.5)
CULTURE RESULTS: SIGNIFICANT CHANGE UP
EGFR: 38 ML/MIN/1.73M2 — LOW
EGFR: 38 ML/MIN/1.73M2 — LOW
EOSINOPHIL # BLD AUTO: 0.05 K/UL — SIGNIFICANT CHANGE UP (ref 0–0.7)
EOSINOPHIL NFR BLD AUTO: 1.1 % — SIGNIFICANT CHANGE UP (ref 0–8)
GLUCOSE BLDC GLUCOMTR-MCNC: 116 MG/DL — HIGH (ref 70–99)
GLUCOSE BLDC GLUCOMTR-MCNC: 142 MG/DL — HIGH (ref 70–99)
GLUCOSE BLDC GLUCOMTR-MCNC: 157 MG/DL — HIGH (ref 70–99)
GLUCOSE BLDC GLUCOMTR-MCNC: 94 MG/DL — SIGNIFICANT CHANGE UP (ref 70–99)
GLUCOSE SERPL-MCNC: 105 MG/DL — HIGH (ref 70–99)
GLUCOSE SERPL-MCNC: 99 MG/DL — SIGNIFICANT CHANGE UP (ref 70–99)
HCT VFR BLD CALC: 30.9 % — LOW (ref 37–47)
HGB BLD-MCNC: 9.7 G/DL — LOW (ref 12–16)
IMM GRANULOCYTES NFR BLD AUTO: 0.4 % — HIGH (ref 0.1–0.3)
LYMPHOCYTES # BLD AUTO: 0.82 K/UL — LOW (ref 1.2–3.4)
LYMPHOCYTES # BLD AUTO: 18 % — LOW (ref 20.5–51.1)
MAGNESIUM SERPL-MCNC: 1.5 MG/DL — LOW (ref 1.8–2.4)
MAGNESIUM SERPL-MCNC: 1.5 MG/DL — LOW (ref 1.8–2.4)
MCHC RBC-ENTMCNC: 27.8 PG — SIGNIFICANT CHANGE UP (ref 27–31)
MCHC RBC-ENTMCNC: 31.4 G/DL — LOW (ref 32–37)
MCV RBC AUTO: 88.5 FL — SIGNIFICANT CHANGE UP (ref 81–99)
MONOCYTES # BLD AUTO: 0.7 K/UL — HIGH (ref 0.1–0.6)
MONOCYTES NFR BLD AUTO: 15.4 % — HIGH (ref 1.7–9.3)
NEUTROPHILS # BLD AUTO: 2.89 K/UL — SIGNIFICANT CHANGE UP (ref 1.4–6.5)
NEUTROPHILS NFR BLD AUTO: 63.6 % — SIGNIFICANT CHANGE UP (ref 42.2–75.2)
NRBC # BLD: 0 /100 WBCS — SIGNIFICANT CHANGE UP (ref 0–0)
PLATELET # BLD AUTO: 146 K/UL — SIGNIFICANT CHANGE UP (ref 130–400)
POTASSIUM SERPL-MCNC: 4.8 MMOL/L — SIGNIFICANT CHANGE UP (ref 3.5–5)
POTASSIUM SERPL-MCNC: 4.9 MMOL/L — SIGNIFICANT CHANGE UP (ref 3.5–5)
POTASSIUM SERPL-SCNC: 4.8 MMOL/L — SIGNIFICANT CHANGE UP (ref 3.5–5)
POTASSIUM SERPL-SCNC: 4.9 MMOL/L — SIGNIFICANT CHANGE UP (ref 3.5–5)
PROT SERPL-MCNC: 6.5 G/DL — SIGNIFICANT CHANGE UP (ref 6–8)
RBC # BLD: 3.49 M/UL — LOW (ref 4.2–5.4)
RBC # FLD: 19.1 % — HIGH (ref 11.5–14.5)
SODIUM SERPL-SCNC: 137 MMOL/L — SIGNIFICANT CHANGE UP (ref 135–146)
SODIUM SERPL-SCNC: 138 MMOL/L — SIGNIFICANT CHANGE UP (ref 135–146)
SPECIMEN SOURCE: SIGNIFICANT CHANGE UP
WBC # BLD: 4.55 K/UL — LOW (ref 4.8–10.8)
WBC # FLD AUTO: 4.55 K/UL — LOW (ref 4.8–10.8)

## 2023-01-26 PROCEDURE — 99233 SBSQ HOSP IP/OBS HIGH 50: CPT

## 2023-01-26 RX ORDER — MORPHINE SULFATE 50 MG/1
4 CAPSULE, EXTENDED RELEASE ORAL EVERY 4 HOURS
Refills: 0 | Status: DISCONTINUED | OUTPATIENT
Start: 2023-01-26 | End: 2023-02-02

## 2023-01-26 RX ORDER — MAGNESIUM SULFATE 500 MG/ML
2 VIAL (ML) INJECTION EVERY 4 HOURS
Refills: 0 | Status: DISCONTINUED | OUTPATIENT
Start: 2023-01-26 | End: 2023-01-26

## 2023-01-26 RX ORDER — MAGNESIUM OXIDE 400 MG ORAL TABLET 241.3 MG
400 TABLET ORAL
Refills: 0 | Status: DISCONTINUED | OUTPATIENT
Start: 2023-01-26 | End: 2023-01-26

## 2023-01-26 RX ORDER — SODIUM CHLORIDE 5 G/100ML
150 INJECTION, SOLUTION INTRAVENOUS
Refills: 0 | Status: DISCONTINUED | OUTPATIENT
Start: 2023-01-27 | End: 2023-01-30

## 2023-01-26 RX ORDER — MAGNESIUM OXIDE 400 MG ORAL TABLET 241.3 MG
400 TABLET ORAL
Refills: 0 | Status: DISCONTINUED | OUTPATIENT
Start: 2023-01-26 | End: 2023-02-04

## 2023-01-26 RX ORDER — SODIUM CHLORIDE 5 G/100ML
150 INJECTION, SOLUTION INTRAVENOUS
Refills: 0 | Status: DISCONTINUED | OUTPATIENT
Start: 2023-01-26 | End: 2023-01-30

## 2023-01-26 RX ADMIN — Medication 5 MILLIGRAM(S): at 23:26

## 2023-01-26 RX ADMIN — CLOPIDOGREL BISULFATE 75 MILLIGRAM(S): 75 TABLET, FILM COATED ORAL at 11:30

## 2023-01-26 RX ADMIN — OXYCODONE HYDROCHLORIDE 5 MILLIGRAM(S): 5 TABLET ORAL at 20:09

## 2023-01-26 RX ADMIN — MORPHINE SULFATE 4 MILLIGRAM(S): 50 CAPSULE, EXTENDED RELEASE ORAL at 11:24

## 2023-01-26 RX ADMIN — GABAPENTIN 100 MILLIGRAM(S): 400 CAPSULE ORAL at 06:53

## 2023-01-26 RX ADMIN — MORPHINE SULFATE 2 MILLIGRAM(S): 50 CAPSULE, EXTENDED RELEASE ORAL at 03:16

## 2023-01-26 RX ADMIN — MORPHINE SULFATE 4 MILLIGRAM(S): 50 CAPSULE, EXTENDED RELEASE ORAL at 10:53

## 2023-01-26 RX ADMIN — SENNA PLUS 2 TABLET(S): 8.6 TABLET ORAL at 22:12

## 2023-01-26 RX ADMIN — OXYCODONE HYDROCHLORIDE 5 MILLIGRAM(S): 5 TABLET ORAL at 21:09

## 2023-01-26 RX ADMIN — Medication 145 MILLIGRAM(S): at 11:30

## 2023-01-26 RX ADMIN — MORPHINE SULFATE 4 MILLIGRAM(S): 50 CAPSULE, EXTENDED RELEASE ORAL at 18:42

## 2023-01-26 RX ADMIN — Medication 1: at 11:59

## 2023-01-26 RX ADMIN — MORPHINE SULFATE 2 MILLIGRAM(S): 50 CAPSULE, EXTENDED RELEASE ORAL at 10:14

## 2023-01-26 RX ADMIN — Medication 25 MICROGRAM(S): at 06:53

## 2023-01-26 RX ADMIN — Medication 100 MILLIGRAM(S): at 06:53

## 2023-01-26 RX ADMIN — MAGNESIUM OXIDE 400 MG ORAL TABLET 400 MILLIGRAM(S): 241.3 TABLET ORAL at 17:34

## 2023-01-26 RX ADMIN — MORPHINE SULFATE 4 MILLIGRAM(S): 50 CAPSULE, EXTENDED RELEASE ORAL at 22:12

## 2023-01-26 RX ADMIN — SODIUM CHLORIDE 50 MILLILITER(S): 5 INJECTION, SOLUTION INTRAVENOUS at 18:03

## 2023-01-26 RX ADMIN — GABAPENTIN 100 MILLIGRAM(S): 400 CAPSULE ORAL at 17:33

## 2023-01-26 RX ADMIN — ATORVASTATIN CALCIUM 80 MILLIGRAM(S): 80 TABLET, FILM COATED ORAL at 22:12

## 2023-01-26 RX ADMIN — PANTOPRAZOLE SODIUM 40 MILLIGRAM(S): 20 TABLET, DELAYED RELEASE ORAL at 06:53

## 2023-01-26 RX ADMIN — POLYETHYLENE GLYCOL 3350 17 GRAM(S): 17 POWDER, FOR SOLUTION ORAL at 17:35

## 2023-01-26 RX ADMIN — MORPHINE SULFATE 2 MILLIGRAM(S): 50 CAPSULE, EXTENDED RELEASE ORAL at 09:18

## 2023-01-26 RX ADMIN — MORPHINE SULFATE 4 MILLIGRAM(S): 50 CAPSULE, EXTENDED RELEASE ORAL at 22:27

## 2023-01-26 RX ADMIN — MORPHINE SULFATE 4 MILLIGRAM(S): 50 CAPSULE, EXTENDED RELEASE ORAL at 17:40

## 2023-01-26 RX ADMIN — BUDESONIDE AND FORMOTEROL FUMARATE DIHYDRATE 2 PUFF(S): 160; 4.5 AEROSOL RESPIRATORY (INHALATION) at 08:24

## 2023-01-26 RX ADMIN — ENOXAPARIN SODIUM 40 MILLIGRAM(S): 100 INJECTION SUBCUTANEOUS at 06:53

## 2023-01-26 NOTE — PROVIDER CONTACT NOTE (OTHER) - SITUATION
Patient complained of pain and was given morphine as ordered, however patient stated pain remains unrelieved.

## 2023-01-26 NOTE — PROGRESS NOTE ADULT - SUBJECTIVE AND OBJECTIVE BOX
WILLIAN MARY 57y Female  MRN#: [id number]  Hospital Day: 7d    Pt is currently admitted with the primary diagnosis of shortness of breath    57 year old female with PMH of COPD on 4 L home O2, CORNELIUS on CPAP, HFrEF (19% on TTE 02/2022, s/p ICD, pulmonary HTN, CAD s/p PCI to RCA)1, HTN, HLD, CVA with residual LLE weakness, DM, active smoker on home O2, presented to the ED for foot pain.     She reports that pain started in bilateral legs two days ago. She also noticed increased swelling in both legs. Pain was so intense, she could no longer ambulate or touch her feet. Symptoms are worse on her L leg. She also reports that her chronic shortness of breath has been worse than usual. She also endorses that she generally feels more swollen than usual.     SUBJECTIVE  Overnight events: None  Subjective complaints: shortness of breath. R shoulder pain. No fever, chills, chest pain.     Present Today:   - Wilkerson:  No [ x ], Yes [   ] : Indication:     - Type of IV Access:       .. CVC/Piccline:  No [ x ], Yes [   ] : Indication:       .. Midline: No [ x ], Yes [   ] : Indication:                                             ----------------------------------------------------------  OBJECTIVE    VITAL SIGNS: Last 24 Hours  T(C): 36.2 (26 Jan 2023 12:41), Max: 36.7 (25 Jan 2023 20:20)  T(F): 97.1 (26 Jan 2023 12:41), Max: 98 (25 Jan 2023 20:20)  HR: 79 (26 Jan 2023 12:41) (78 - 80)  BP: 110/65 (26 Jan 2023 12:41) (101/56 - 110/65)  BP(mean): --  RR: 18 (25 Jan 2023 20:20) (18 - 18)  SpO2: 96% (26 Jan 2023 12:41) (96% - 96%)      01-25-23 @ 07:01  -  01-26-23 @ 07:00  --------------------------------------------------------  IN: 655 mL / OUT: 4401 mL / NET: -3746 mL    01-26-23 @ 07:01  -  01-26-23 @ 13:24  --------------------------------------------------------  IN: 35 mL / OUT: 1000 mL / NET: -965 mL      PHYSICAL EXAM:  General: Chronically ill-appearing in NAD.  HEENT: Normocephalic, nontraumatic. PERRL.  LUNGS: Decreased breat sounds at b/l bases No wheezes, rales, or rhonchi.  HEART: RRR. No murmurs, rubs, or gallops.  ABDOMEN: Soft, nontender, nondistended. + bowel sounds.  EXT: Pulses palpable x 4. +edema in all 4 extremities.  NEURO: AAOx4.  SKIN: Warm, dry.    PAST MEDICAL & SURGICAL HISTORY  Hypertension  Hyperlipidemia  Anxiety and depression  COPD, severe  CHF (congestive heart failure)  Cerebrovascular accident (CVA) Multiple  Type 2 diabetes mellitus  CKD (chronic kidney disease), stage II  No significant past surgical history                                            -----------------------------------------------------------  ALLERGIES:  No Known Allergies                                            ------------------------------------------------------------    HOME MEDICATIONS  Home Medications:  Farxiga 10 mg oral tablet: 1 tab(s) orally once a day (20 Jan 2023 11:30)  fenofibrate 145 mg oral tablet: 1 tab(s) orally once a day (20 Jan 2023 11:30)  magnesium gluconate 500 mg oral tablet: 1 tab(s) orally 2 times a day (20 Jan 2023 11:30)  metoprolol succinate 100 mg oral tablet, extended release: 1 tab(s) orally once a day (20 Jan 2023 11:30)  pantoprazole 40 mg oral delayed release tablet: 1 tab(s) orally once a day (before a meal) (20 Jan 2023 11:30)  Synthroid 25 mcg (0.025 mg) oral tablet: 1 tab(s) orally once a day (20 Jan 2023 11:30)                           MEDICATIONS:  STANDING MEDICATIONS  atorvastatin 80 milliGRAM(s) Oral at bedtime  budesonide 160 MICROgram(s)/formoterol 4.5 MICROgram(s) Inhaler 2 Puff(s) Inhalation two times a day  buMETAnide Infusion 1 mG/Hr IV Continuous <Continuous>  clopidogrel Tablet 75 milliGRAM(s) Oral daily  dextrose 5%. 1000 milliLiter(s) IV Continuous <Continuous>  dextrose 5%. 1000 milliLiter(s) IV Continuous <Continuous>  dextrose 50% Injectable 25 Gram(s) IV Push once  dextrose 50% Injectable 12.5 Gram(s) IV Push once  dextrose 50% Injectable 25 Gram(s) IV Push once  enoxaparin Injectable 40 milliGRAM(s) SubCutaneous every 24 hours  fenofibrate Tablet 145 milliGRAM(s) Oral daily  gabapentin 100 milliGRAM(s) Oral two times a day  glucagon  Injectable 1 milliGRAM(s) IntraMuscular once  insulin lispro (ADMELOG) corrective regimen sliding scale   SubCutaneous three times a day before meals  levothyroxine 25 MICROGram(s) Oral daily  magnesium oxide 400 milliGRAM(s) Oral three times a day with meals  metoprolol succinate  milliGRAM(s) Oral daily  pantoprazole    Tablet 40 milliGRAM(s) Oral before breakfast  polyethylene glycol 3350 17 Gram(s) Oral two times a day  senna 2 Tablet(s) Oral at bedtime  sodium chloride 3%. 150 milliLiter(s) IV Continuous <Continuous>  sodium chloride 3%. 150 milliLiter(s) IV Continuous <Continuous>  sodium chloride 3%. 150 milliLiter(s) IV Continuous <Continuous>  sodium chloride 3%. 150 milliLiter(s) IV Continuous <Continuous>  sodium zirconium cyclosilicate 10 Gram(s) Oral once    PRN MEDICATIONS  dextrose Oral Gel 15 Gram(s) Oral once PRN  melatonin 5 milliGRAM(s) Oral at bedtime PRN  morphine  - Injectable 4 milliGRAM(s) IV Push every 4 hours PRN  oxyCODONE    IR 5 milliGRAM(s) Oral every 4 hours PRN                                            ------------------------------------------------------------    LABS:                        9.7    4.55  )-----------( 146      ( 26 Jan 2023 06:11 )             30.9     01-26    138  |  103  |  31<H>  ----------------------------<  105<H>  4.8   |  27  |  1.6<H>    Ca    9.0      26 Jan 2023 06:11  Mg     1.5     01-26    TPro  6.5  /  Alb  3.5  /  TBili  1.1  /  DBili  x   /  AST  15  /  ALT  <5  /  AlkPhos  68  01-26    Culture - Blood (collected 25 Jan 2023 06:46)  Source: .Blood None  Preliminary Report (26 Jan 2023 13:02):    No growth to date.    CAPILLARY BLOOD GLUCOSE  POCT Blood Glucose.: 157 mg/dL (26 Jan 2023 11:44)  POCT Blood Glucose.: 94 mg/dL (26 Jan 2023 08:00)  POCT Blood Glucose.: 137 mg/dL (25 Jan 2023 21:08)  POCT Blood Glucose.: 128 mg/dL (25 Jan 2023 16:18)                                            -------------------------------------------------------------  RADIOLOGY:    < from: CT Chest No Cont (01.24.23 @ 11:26) >  IMPRESSION:  Since  12/3/2022:  Extensive soft tissue swelling left chest wall, involving adjacent breast is noted, similar to previous study. No discrete fluid collections or air bubbles.  Increasing amounts of anasarca throughout the thorax are also noted.  Stable pericardial effusion widest diameter 2.3 cm.  Bilateral groundglass opacities with septal thickening is noted suggesting interstitial edema      < from: Xray Chest 1 View- PORTABLE-Urgent (Xray Chest 1 View- PORTABLE-Urgent .) (01.21.23 @ 14:42) >  Impression: Stable pulmonary congestion. No pneumothorax      < from: VA Duplex Lower Extrem Arterial, Bilat (01.19.23 @ 16:57) >  IMPRESSION: No flow is visualized in distal right SFA and proximal and mid left SFA. Reconstitution in the popliteal artery bilaterally with diminished flow in tibial arteries.      < from: VA Duplex Lower Ext Vein Scan, Bilat (01.18.23 @ 22:06) >  Impression:  No evidence of deep venous thrombosis or superficial thrombophlebitis in the bilateral lower extremities.  Difficult to compress bilateral common femoral veins due to patient's lack of tolerance.                                            --------------------------------------------------------------

## 2023-01-26 NOTE — PROGRESS NOTE ADULT - ASSESSMENT
57 year old female with PMH of COPD on 4 L home O2, CORNELIUS on CPAP, HFrEF (19% on TTE 02/2022, s/p ICD, pulmonary HTN, CAD s/p PCI to RCA)1, HTN, HLD, CVA with residual LLE weakness, DM, active smoker on home O2, presented to the ED for foot pain.     # Bilateral LE edema, CHF Exacerbation vs. Cellulitis (less likely)   # Chronic Hypoxic Respiratory Failure- slightly worse   # HFrEF   - No out of proportion tenderness, open wound, or purulence on exam   - subjective dyspnea on home 4L NC   - s/p rocephin and flagyl in ED, but changes consistent with venous stasis, not cellulits; no leukocytosis   - BNP 8880, last TTE EF 25-30%   - CXR showed cardiomegaly and cephalization   - bilateral LE duplex negative   - cefepime stopped ( does not look like cellulitis )  - blood cx +ve for coagulase negative gram + cocci, f/u repeat ( probably contaminated )  - Discussed with EP, pt had life vest 2x before and was non compliant and lost the vests. For new AICD they need ID clearance before given +ve blood cx   - scheduled with EP for ICD on 2/13 - Need ID clearance   - ID rec imaging for explant to ro abscess   - Per HF recs: Bumex drip 1mg/hr with hypertonic saline    #PAD  - No flow is visualized in distal right SFA and proximal and mid left SFA.  - Reconstitution in the popliteal artery bilaterally with diminished flow in tibial arteries.  - Vascular wants CTA when kidney function improved  - c/w plavix and statin     #JEMIMA   - Cr 1.6 (b/l 1.1)  - Hold spironolactone   - Low potassium diet  - Lokelma as needed  - q12h BMP    # COPD - on home 4L O2  # CORNELIUS - on CPAP at home   - c/w symbicort     # CAD s/p PCI   # HTN   - C/w plavix   - Holding entresto   - C/w metoprolol 100mg ER     # DM   - Hold home farxiga and metformin   - Fingersticks and insulin PRN     # Chronic normocytic anemia  - Hb 10.3; f/u Fe studies     # DLD   - C/w Lipitor 80mg qd, Fenofibrate 145mg qd    # Hx of CVA  - c/w plavix     # Other  DVT ppx: Lovenox   GI ppx: protonix   Diet: DASH  Activity: IAT  Dispo: acute

## 2023-01-27 ENCOUNTER — TRANSCRIPTION ENCOUNTER (OUTPATIENT)
Age: 58
End: 2023-01-27

## 2023-01-27 LAB
ALBUMIN SERPL ELPH-MCNC: 3.2 G/DL — LOW (ref 3.5–5.2)
ALP SERPL-CCNC: 62 U/L — SIGNIFICANT CHANGE UP (ref 30–115)
ALT FLD-CCNC: <5 U/L — SIGNIFICANT CHANGE UP (ref 0–41)
ANION GAP SERPL CALC-SCNC: 10 MMOL/L — SIGNIFICANT CHANGE UP (ref 7–14)
ANION GAP SERPL CALC-SCNC: 7 MMOL/L — SIGNIFICANT CHANGE UP (ref 7–14)
AST SERPL-CCNC: 15 U/L — SIGNIFICANT CHANGE UP (ref 0–41)
BASOPHILS # BLD AUTO: 0.05 K/UL — SIGNIFICANT CHANGE UP (ref 0–0.2)
BASOPHILS NFR BLD AUTO: 1.3 % — HIGH (ref 0–1)
BILIRUB SERPL-MCNC: 1.1 MG/DL — SIGNIFICANT CHANGE UP (ref 0.2–1.2)
BUN SERPL-MCNC: 37 MG/DL — HIGH (ref 10–20)
BUN SERPL-MCNC: 38 MG/DL — HIGH (ref 10–20)
CALCIUM SERPL-MCNC: 8.7 MG/DL — SIGNIFICANT CHANGE UP (ref 8.4–10.4)
CALCIUM SERPL-MCNC: 8.9 MG/DL — SIGNIFICANT CHANGE UP (ref 8.4–10.4)
CHLORIDE SERPL-SCNC: 102 MMOL/L — SIGNIFICANT CHANGE UP (ref 98–110)
CHLORIDE SERPL-SCNC: 99 MMOL/L — SIGNIFICANT CHANGE UP (ref 98–110)
CO2 SERPL-SCNC: 32 MMOL/L — SIGNIFICANT CHANGE UP (ref 17–32)
CO2 SERPL-SCNC: 33 MMOL/L — HIGH (ref 17–32)
CREAT SERPL-MCNC: 1.5 MG/DL — SIGNIFICANT CHANGE UP (ref 0.7–1.5)
CREAT SERPL-MCNC: 1.6 MG/DL — HIGH (ref 0.7–1.5)
EGFR: 38 ML/MIN/1.73M2 — LOW
EGFR: 41 ML/MIN/1.73M2 — LOW
EOSINOPHIL # BLD AUTO: 0.07 K/UL — SIGNIFICANT CHANGE UP (ref 0–0.7)
EOSINOPHIL NFR BLD AUTO: 1.9 % — SIGNIFICANT CHANGE UP (ref 0–8)
FERRITIN SERPL-MCNC: 77 NG/ML — SIGNIFICANT CHANGE UP (ref 15–150)
GLUCOSE BLDC GLUCOMTR-MCNC: 116 MG/DL — HIGH (ref 70–99)
GLUCOSE BLDC GLUCOMTR-MCNC: 126 MG/DL — HIGH (ref 70–99)
GLUCOSE BLDC GLUCOMTR-MCNC: 154 MG/DL — HIGH (ref 70–99)
GLUCOSE BLDC GLUCOMTR-MCNC: 95 MG/DL — SIGNIFICANT CHANGE UP (ref 70–99)
GLUCOSE SERPL-MCNC: 142 MG/DL — HIGH (ref 70–99)
GLUCOSE SERPL-MCNC: 98 MG/DL — SIGNIFICANT CHANGE UP (ref 70–99)
HCT VFR BLD CALC: 29 % — LOW (ref 37–47)
HGB BLD-MCNC: 9.3 G/DL — LOW (ref 12–16)
IMM GRANULOCYTES NFR BLD AUTO: 0.3 % — SIGNIFICANT CHANGE UP (ref 0.1–0.3)
IRON SATN MFR SERPL: 15 % — SIGNIFICANT CHANGE UP (ref 15–50)
IRON SATN MFR SERPL: 53 UG/DL — SIGNIFICANT CHANGE UP (ref 35–150)
LYMPHOCYTES # BLD AUTO: 0.82 K/UL — LOW (ref 1.2–3.4)
LYMPHOCYTES # BLD AUTO: 21.7 % — SIGNIFICANT CHANGE UP (ref 20.5–51.1)
MAGNESIUM SERPL-MCNC: 1.4 MG/DL — LOW (ref 1.8–2.4)
MAGNESIUM SERPL-MCNC: 1.7 MG/DL — LOW (ref 1.8–2.4)
MCHC RBC-ENTMCNC: 28.2 PG — SIGNIFICANT CHANGE UP (ref 27–31)
MCHC RBC-ENTMCNC: 32.1 G/DL — SIGNIFICANT CHANGE UP (ref 32–37)
MCV RBC AUTO: 87.9 FL — SIGNIFICANT CHANGE UP (ref 81–99)
MONOCYTES # BLD AUTO: 0.63 K/UL — HIGH (ref 0.1–0.6)
MONOCYTES NFR BLD AUTO: 16.7 % — HIGH (ref 1.7–9.3)
NEUTROPHILS # BLD AUTO: 2.2 K/UL — SIGNIFICANT CHANGE UP (ref 1.4–6.5)
NEUTROPHILS NFR BLD AUTO: 58.1 % — SIGNIFICANT CHANGE UP (ref 42.2–75.2)
NRBC # BLD: 0 /100 WBCS — SIGNIFICANT CHANGE UP (ref 0–0)
PLATELET # BLD AUTO: 156 K/UL — SIGNIFICANT CHANGE UP (ref 130–400)
POTASSIUM SERPL-MCNC: 4.6 MMOL/L — SIGNIFICANT CHANGE UP (ref 3.5–5)
POTASSIUM SERPL-MCNC: 4.7 MMOL/L — SIGNIFICANT CHANGE UP (ref 3.5–5)
POTASSIUM SERPL-SCNC: 4.6 MMOL/L — SIGNIFICANT CHANGE UP (ref 3.5–5)
POTASSIUM SERPL-SCNC: 4.7 MMOL/L — SIGNIFICANT CHANGE UP (ref 3.5–5)
PROT SERPL-MCNC: 6.4 G/DL — SIGNIFICANT CHANGE UP (ref 6–8)
RBC # BLD: 3.3 M/UL — LOW (ref 4.2–5.4)
RBC # FLD: 18.6 % — HIGH (ref 11.5–14.5)
SODIUM SERPL-SCNC: 141 MMOL/L — SIGNIFICANT CHANGE UP (ref 135–146)
SODIUM SERPL-SCNC: 142 MMOL/L — SIGNIFICANT CHANGE UP (ref 135–146)
TIBC SERPL-MCNC: 346 UG/DL — SIGNIFICANT CHANGE UP (ref 220–430)
TRANSFERRIN SERPL-MCNC: 309 MG/DL — SIGNIFICANT CHANGE UP (ref 200–360)
UIBC SERPL-MCNC: 293 UG/DL — SIGNIFICANT CHANGE UP (ref 110–370)
WBC # BLD: 3.78 K/UL — LOW (ref 4.8–10.8)
WBC # FLD AUTO: 3.78 K/UL — LOW (ref 4.8–10.8)

## 2023-01-27 PROCEDURE — 99233 SBSQ HOSP IP/OBS HIGH 50: CPT

## 2023-01-27 RX ORDER — MAGNESIUM SULFATE 500 MG/ML
2 VIAL (ML) INJECTION ONCE
Refills: 0 | Status: COMPLETED | OUTPATIENT
Start: 2023-01-27 | End: 2023-01-27

## 2023-01-27 RX ADMIN — SENNA PLUS 2 TABLET(S): 8.6 TABLET ORAL at 21:55

## 2023-01-27 RX ADMIN — GABAPENTIN 100 MILLIGRAM(S): 400 CAPSULE ORAL at 17:16

## 2023-01-27 RX ADMIN — CLOPIDOGREL BISULFATE 75 MILLIGRAM(S): 75 TABLET, FILM COATED ORAL at 11:55

## 2023-01-27 RX ADMIN — OXYCODONE HYDROCHLORIDE 5 MILLIGRAM(S): 5 TABLET ORAL at 20:49

## 2023-01-27 RX ADMIN — MORPHINE SULFATE 4 MILLIGRAM(S): 50 CAPSULE, EXTENDED RELEASE ORAL at 14:09

## 2023-01-27 RX ADMIN — Medication 25 MICROGRAM(S): at 05:34

## 2023-01-27 RX ADMIN — Medication 145 MILLIGRAM(S): at 11:55

## 2023-01-27 RX ADMIN — MAGNESIUM OXIDE 400 MG ORAL TABLET 400 MILLIGRAM(S): 241.3 TABLET ORAL at 17:17

## 2023-01-27 RX ADMIN — MORPHINE SULFATE 4 MILLIGRAM(S): 50 CAPSULE, EXTENDED RELEASE ORAL at 05:33

## 2023-01-27 RX ADMIN — MORPHINE SULFATE 4 MILLIGRAM(S): 50 CAPSULE, EXTENDED RELEASE ORAL at 18:04

## 2023-01-27 RX ADMIN — MORPHINE SULFATE 4 MILLIGRAM(S): 50 CAPSULE, EXTENDED RELEASE ORAL at 09:28

## 2023-01-27 RX ADMIN — SODIUM CHLORIDE 50 MILLILITER(S): 5 INJECTION, SOLUTION INTRAVENOUS at 06:10

## 2023-01-27 RX ADMIN — MORPHINE SULFATE 4 MILLIGRAM(S): 50 CAPSULE, EXTENDED RELEASE ORAL at 05:48

## 2023-01-27 RX ADMIN — Medication 25 GRAM(S): at 20:40

## 2023-01-27 RX ADMIN — PANTOPRAZOLE SODIUM 40 MILLIGRAM(S): 20 TABLET, DELAYED RELEASE ORAL at 05:35

## 2023-01-27 RX ADMIN — GABAPENTIN 100 MILLIGRAM(S): 400 CAPSULE ORAL at 05:34

## 2023-01-27 RX ADMIN — MORPHINE SULFATE 4 MILLIGRAM(S): 50 CAPSULE, EXTENDED RELEASE ORAL at 22:16

## 2023-01-27 RX ADMIN — MAGNESIUM OXIDE 400 MG ORAL TABLET 400 MILLIGRAM(S): 241.3 TABLET ORAL at 11:55

## 2023-01-27 RX ADMIN — MAGNESIUM OXIDE 400 MG ORAL TABLET 400 MILLIGRAM(S): 241.3 TABLET ORAL at 08:36

## 2023-01-27 RX ADMIN — MORPHINE SULFATE 4 MILLIGRAM(S): 50 CAPSULE, EXTENDED RELEASE ORAL at 09:43

## 2023-01-27 RX ADMIN — Medication 25 GRAM(S): at 11:55

## 2023-01-27 RX ADMIN — MORPHINE SULFATE 4 MILLIGRAM(S): 50 CAPSULE, EXTENDED RELEASE ORAL at 18:15

## 2023-01-27 RX ADMIN — ATORVASTATIN CALCIUM 80 MILLIGRAM(S): 80 TABLET, FILM COATED ORAL at 21:55

## 2023-01-27 RX ADMIN — MORPHINE SULFATE 4 MILLIGRAM(S): 50 CAPSULE, EXTENDED RELEASE ORAL at 14:28

## 2023-01-27 RX ADMIN — ENOXAPARIN SODIUM 40 MILLIGRAM(S): 100 INJECTION SUBCUTANEOUS at 05:34

## 2023-01-27 RX ADMIN — BUDESONIDE AND FORMOTEROL FUMARATE DIHYDRATE 2 PUFF(S): 160; 4.5 AEROSOL RESPIRATORY (INHALATION) at 08:36

## 2023-01-27 RX ADMIN — BUDESONIDE AND FORMOTEROL FUMARATE DIHYDRATE 2 PUFF(S): 160; 4.5 AEROSOL RESPIRATORY (INHALATION) at 19:55

## 2023-01-27 RX ADMIN — Medication 100 MILLIGRAM(S): at 05:34

## 2023-01-27 RX ADMIN — OXYCODONE HYDROCHLORIDE 5 MILLIGRAM(S): 5 TABLET ORAL at 19:49

## 2023-01-27 RX ADMIN — MORPHINE SULFATE 4 MILLIGRAM(S): 50 CAPSULE, EXTENDED RELEASE ORAL at 22:31

## 2023-01-27 NOTE — CHART NOTE - NSCHARTNOTEFT_GEN_A_CORE
Brief Nutrition Note: met patient related to length of stay. Per pt, feeling better today, good appetite and intake, % of meals, CBW now consistent with UBW, edema improving per pt. No nutrition interventions indicated at this time. Pt is not currently at nutrition risk, re-screen x 7 days or as needed.   [id number] or TEAMS

## 2023-01-27 NOTE — PROGRESS NOTE ADULT - SUBJECTIVE AND OBJECTIVE BOX
USMAN WILLIAN  57y Female    CHIEF COMPLAINT:    Patient is a 57y old  Female who presents with a chief complaint of shortness of breath (2023 13:24)    INTERVAL HPI/OVERNIGHT EVENTS:    Patient seen and examined. No acute events overnight. Slowly improving     ROS: All other systems are negative.    Vital Signs:    T(F): 97.1 (23 @ 04:51), Max: 97.6 (23 @ 20:25)  HR: 79 (23 @ 04:51) (77 - 89)  BP: 108/61 (23 @ 04:51) (101/57 - 110/65)  RR: 18 (23 @ 04:51) (18 - 18)  SpO2: 96% (23 @ 12:41) (96% - 96%)    2023 07:01  -  2023 07:00  --------------------------------------------------------  IN: 881 mL / OUT: 3150 mL / NET: -2269 mL    Daily Weight in k (2023 04:51)    POCT Blood Glucose.: 95 mg/dL (2023 07:56)  POCT Blood Glucose.: 142 mg/dL (2023 21:45)  POCT Blood Glucose.: 116 mg/dL (2023 17:05)  POCT Blood Glucose.: 157 mg/dL (2023 11:44)    PHYSICAL EXAM:    GENERAL:  NAD  SKIN: No rashes or lesions  HEENT: Atraumatic. Normocephalic.   NECK: Supple, No JVD.  PULMONARY: decreased breath sounds B/L. No wheezing.    CVS: Normal S1, S2. Rate and Rhythm are regular.   ABDOMEN/GI: Soft, Nontender, Nondistended    MSK:  No clubbing or cyanosis. Improving edema  NEUROLOGIC: moves all extremities   PSYCH: Alert & oriented x 3     Consultant(s) Notes Reviewed:  [x ] YES  [ ] NO  Care Discussed with Consultants/Other Providers [ x] YES  [ ] NO    LABS:                        9.3    3.78  )-----------( 156      ( 2023 07:14 )             29.0     142  |  102  |  37<H>  ----------------------------<  98  4.6   |  33<H>  |  1.5    Ca    8.7      2023 07:14  Mg     1.4         TPro  6.4  /  Alb  3.2<L>  /  TBili  1.1  /  DBili  x   /  AST  15  /  ALT  <5  /  AlkPhos  62      Serum Pro-Brain Natriuretic Peptide: 02516 pg/mL (23 @ 06:11)    Culture - Blood (collected 2023 06:46)  Source: .Blood None  Preliminary Report (2023 13:02):    No growth to date.    RADIOLOGY & ADDITIONAL TESTS:  Imaging or report Personally Reviewed:  [x] YES  [ ] NO  EKG reviewed: [x] YES  [ ] NO    Medications:  Standing  atorvastatin 80 milliGRAM(s) Oral at bedtime  budesonide 160 MICROgram(s)/formoterol 4.5 MICROgram(s) Inhaler 2 Puff(s) Inhalation two times a day  buMETAnide Infusion 1 mG/Hr IV Continuous <Continuous>  clopidogrel Tablet 75 milliGRAM(s) Oral daily  dextrose 5%. 1000 milliLiter(s) IV Continuous <Continuous>  dextrose 5%. 1000 milliLiter(s) IV Continuous <Continuous>  dextrose 50% Injectable 25 Gram(s) IV Push once  dextrose 50% Injectable 12.5 Gram(s) IV Push once  dextrose 50% Injectable 25 Gram(s) IV Push once  enoxaparin Injectable 40 milliGRAM(s) SubCutaneous every 24 hours  fenofibrate Tablet 145 milliGRAM(s) Oral daily  gabapentin 100 milliGRAM(s) Oral two times a day  glucagon  Injectable 1 milliGRAM(s) IntraMuscular once  insulin lispro (ADMELOG) corrective regimen sliding scale   SubCutaneous three times a day before meals  levothyroxine 25 MICROGram(s) Oral daily  magnesium oxide 400 milliGRAM(s) Oral three times a day with meals  metoprolol succinate  milliGRAM(s) Oral daily  pantoprazole    Tablet 40 milliGRAM(s) Oral before breakfast  polyethylene glycol 3350 17 Gram(s) Oral two times a day  senna 2 Tablet(s) Oral at bedtime  sodium chloride 3%. 150 milliLiter(s) IV Continuous <Continuous>  sodium chloride 3%. 150 milliLiter(s) IV Continuous <Continuous>  sodium chloride 3%. 150 milliLiter(s) IV Continuous <Continuous>  sodium chloride 3%. 150 milliLiter(s) IV Continuous <Continuous>  sodium chloride 3%. 150 milliLiter(s) IV Continuous <Continuous>    PRN Meds  dextrose Oral Gel 15 Gram(s) Oral once PRN  melatonin 5 milliGRAM(s) Oral at bedtime PRN  morphine  - Injectable 4 milliGRAM(s) IV Push every 4 hours PRN  oxyCODONE    IR 5 milliGRAM(s) Oral every 4 hours PRN

## 2023-01-27 NOTE — PROGRESS NOTE ADULT - ASSESSMENT
57 year old female with PMH of COPD on 4 L home O2, CORNELIUS on CPAP, HFrEF (19% on TTE 02/2022, s/p ICD, pulmonary HTN, CAD s/p PCI to RCA)1, HTN, HLD, CVA with residual LLE weakness, DM, active smoker on home O2 4L NC, presented to the ED for foot pain.  Patient does not know any of her meds or doses    SOB associated with  severe lower extremity edema due to acute on chronic HFrEF (19%)  Fluid Overload  medication noncompliance   - remains fluid overloaded clinically but improving   - Appreciate HF Consult -> Hypertonic Saline and Bumex Drip   - f/u K/Mg/Cr daily   * pt was on torsemide 20mg po daily at home, holding home spironolactone   - Cont entresto   - Cont metoprolol   - EP consult appreciated: plan for ICD per Dr Soto, continue with telemetry monitoring for now  - f/u HF  - start magnesium 400 q8H and IV repletion as pt at times refuses IV infusion    Severe bilateral PAD  CAD s/p PCI / HTN   History of CVA  - Arterial duplex:  a) No flow is visualized in distal right SFA and proximal and mid left SFA.  b) Reconstitution in the popliteal artery bilaterally with diminished flow in tibial arteries.  -vascular cardiology on board, recommend cross sectional imaging once renal fxn improves  - c/w statin and plavix     AICD Site Infection PRIOR h/x   - now removed, no erythema or drainage around site   - CT chest reviewed not suggestive of collection in lt chest/breast area  - Blood cx :Staph haemolyticus in one bottle Likely contamination.    -Repeat blood cxs NGTD  - will need ICD placement with EP    hx of chronic hypoxic resp failure on 4 liter oxygen  due to COPD - stable   CORNELIUS  - Cont inhalers, CPAP     JEMIMA 2/2 HFrEF Exacerbation/diuresis   - baseline 1.3-1.4  - c/w diuresis per HF recs   - f/u Cr daily if worsens get Nephro consult f/u all electrolytes daily   - I/O's monitoring      DM-II   Suspected diabetic neuropathy  - hold home farxiga and metformin   - fingersticks and insulin   - started on gabapentin      Chronic Normocytic Anemia - stable    Shoulder pain - likely from immobility - not responsive to oxycodone  - trial of morphine. Ensure bowel regimen and PT    Overall prognosis poor given chronic medical conditions    Pending: diuresis, , ICD with ep and vascular cardiology f/u, pain control   Discussed with patient .

## 2023-01-27 NOTE — PROGRESS NOTE ADULT - SUBJECTIVE AND OBJECTIVE BOX
Vascular Cardiology  Progress note     EMAIL [email or web address]     CC:  leg swelling and pain, L>R    INTERVAL HISTORY:  Continue to have bilateral leg pain, L>R, but with mild improvement.          Allergies    No Known Allergies    Intolerances    	    MEDICATIONS:  buMETAnide Infusion 1 mG/Hr IV Continuous <Continuous>  clopidogrel Tablet 75 milliGRAM(s) Oral daily  enoxaparin Injectable 40 milliGRAM(s) SubCutaneous every 24 hours  metoprolol succinate  milliGRAM(s) Oral daily      budesonide 160 MICROgram(s)/formoterol 4.5 MICROgram(s) Inhaler 2 Puff(s) Inhalation two times a day    gabapentin 100 milliGRAM(s) Oral two times a day  melatonin 5 milliGRAM(s) Oral at bedtime PRN  morphine  - Injectable 4 milliGRAM(s) IV Push every 4 hours PRN  oxyCODONE    IR 5 milliGRAM(s) Oral every 4 hours PRN    pantoprazole    Tablet 40 milliGRAM(s) Oral before breakfast  polyethylene glycol 3350 17 Gram(s) Oral two times a day  senna 2 Tablet(s) Oral at bedtime    atorvastatin 80 milliGRAM(s) Oral at bedtime  dextrose 50% Injectable 25 Gram(s) IV Push once  dextrose 50% Injectable 12.5 Gram(s) IV Push once  dextrose 50% Injectable 25 Gram(s) IV Push once  dextrose Oral Gel 15 Gram(s) Oral once PRN  fenofibrate Tablet 145 milliGRAM(s) Oral daily  glucagon  Injectable 1 milliGRAM(s) IntraMuscular once  insulin lispro (ADMELOG) corrective regimen sliding scale   SubCutaneous three times a day before meals  levothyroxine 25 MICROGram(s) Oral daily    dextrose 5%. 1000 milliLiter(s) IV Continuous <Continuous>  dextrose 5%. 1000 milliLiter(s) IV Continuous <Continuous>  magnesium oxide 400 milliGRAM(s) Oral three times a day with meals  sodium chloride 3%. 150 milliLiter(s) IV Continuous <Continuous>  sodium chloride 3%. 150 milliLiter(s) IV Continuous <Continuous>  sodium chloride 3%. 150 milliLiter(s) IV Continuous <Continuous>  sodium chloride 3%. 150 milliLiter(s) IV Continuous <Continuous>  sodium chloride 3%. 150 milliLiter(s) IV Continuous <Continuous>      PAST MEDICAL & SURGICAL HISTORY:  Hypertension      Hyperlipidemia      Anxiety and depression      COPD, severe      CHF (congestive heart failure)      Cerebrovascular accident (CVA)  Multiple      Type 2 diabetes mellitus      CKD (chronic kidney disease), stage II      No significant past surgical history          FAMILY HISTORY:  FH: diabetes mellitus (Father, Mother)        SOCIAL HISTORY:  unchanged    REVIEW OF SYSTEMS:  CONSTITUTIONAL: No fever, weight loss, or fatigue  EYES: No eye pain, visual disturbances, or discharge  ENT:  No difficulty hearing, tinnitus, vertigo; No sinus or throat pain  NECK: No pain or stiffness  RESPIRATORY: No cough, wheezing, chills or hemoptysis;   CARDIOVASCULAR: No chest pain, palpitations, passing out, dizziness,  GASTROINTESTINAL: No abdominal or epigastric pain. No nausea, vomiting, or hematemesis; No diarrhea or constipation. No melena or hematochezia.  GENITOURINARY: No dysuria, frequency, hematuria, or incontinence  NEUROLOGICAL: No headaches, memory loss, loss of strength, numbness, or tremors  SKIN: No itching, burning, rashes, or lesions   LYMPH Nodes: No enlarged glands  ENDOCRINE: No heat or cold intolerance; No hair loss  MUSCULOSKELETAL: see above  PSYCHIATRIC: No depression, anxiety, mood swings, or difficulty sleeping  HEME/LYMPH: No easy bruising, or bleeding gums  ALLERGY AND IMMUNOLOGIC: No hives or eczema	    [ x] All others negative	  [ ] Unable to obtain    PHYSICAL EXAM:  T(C): 36.4 (01-27-23 @ 12:34), Max: 36.4 (01-26-23 @ 20:25)  HR: 91 (01-27-23 @ 12:34) (74 - 91)  BP: 97/51 (01-27-23 @ 12:34) (97/51 - 108/61)  RR: 18 (01-27-23 @ 12:34) (18 - 18)  SpO2: --  Wt(kg): --  I&O's Summary    26 Jan 2023 07:01  -  27 Jan 2023 07:00  --------------------------------------------------------  IN: 881 mL / OUT: 3150 mL / NET: -2269 mL    27 Jan 2023 07:01  -  27 Jan 2023 14:17  --------------------------------------------------------  IN: 150 mL / OUT: 1250 mL / NET: -1100 mL        Appearance: Normal	  HEENT:   Normal oral mucosa, PERRL, EOMI	  Cardiovascular: Normal S1 S2, No JVD, No murmurs, 1+ pitting edema b/l (improved since last exam on 1/24)  Respiratory: crackles  Psychiatry: A & O x 3, Mood & affect appropriate  Gastrointestinal:  Soft, Non-tender, + BS	  Skin: No rashes, No ecchymoses, No cyanosis	  Neurologic: Non-focal  Extremities: Normal range of motion, No clubbing, cyanosis.    Vascular:     Right DP: []palpable [x]non-palpable []audible      Left DP :   []palpable [x]non-palpable []audible  Right PT: []palpable [x] non-palpable []audible   Left PT:  [] palpable [x] non-palpable []audible      LABS:	 	    CBC Full  -  ( 27 Jan 2023 07:14 )  WBC Count : 3.78 K/uL  Hemoglobin : 9.3 g/dL  Hematocrit : 29.0 %  Platelet Count - Automated : 156 K/uL  Mean Cell Volume : 87.9 fL  Mean Cell Hemoglobin : 28.2 pg  Mean Cell Hemoglobin Concentration : 32.1 g/dL  Auto Neutrophil # : 2.20 K/uL  Auto Lymphocyte # : 0.82 K/uL  Auto Monocyte # : 0.63 K/uL  Auto Eosinophil # : 0.07 K/uL  Auto Basophil # : 0.05 K/uL  Auto Neutrophil % : 58.1 %  Auto Lymphocyte % : 21.7 %  Auto Monocyte % : 16.7 %  Auto Eosinophil % : 1.9 %  Auto Basophil % : 1.3 %    01-27    142  |  102  |  37<H>  ----------------------------<  98  4.6   |  33<H>  |  1.5  01-26    137  |  101  |  33<H>  ----------------------------<  99  4.9   |  26  |  1.6<H>    Ca    8.7      27 Jan 2023 07:14  Ca    8.7      26 Jan 2023 16:34  Mg     1.4     01-27  Mg     1.5     01-26    TPro  6.4  /  Alb  3.2<L>  /  TBili  1.1  /  DBili  x   /  AST  15  /  ALT  <5  /  AlkPhos  62  01-27  TPro  6.5  /  Alb  3.5  /  TBili  1.1  /  DBili  x   /  AST  15  /  ALT  <5  /  AlkPhos  68  01-26

## 2023-01-27 NOTE — PROGRESS NOTE ADULT - ASSESSMENT
ASSESSMENT  - Severe bilateral PAD with rest pain, Clare 4 (no wounds)     - Lower extremity arterial US with low resistance waveforms concerning for inflow disease and severe bilateral superficial femoral artery disease.   - Acute on chronic HFrEF s/p BiV AICD and explant due to infected pocket - remains volume up     - plans for re-implant 2/13/23  - CAD s/p PCI in 6/21  - History of pericardial effusion  - Pulmonary hypertension  - COPD on home oxygen - smoking still  - HTN, HLD, DM  - CVA with residual left sided weakness    RECOMMENDATIONS  - Remains volume up, agree with diuresis as per heart failure team  - Recommend cross-sectional imaging when renal function improved and she is no longer in HF exacerbation  - Continue Plavix and atorvastatin  - Will follow    Arpita Valentine MD  Vascular Cardiology Attending  Please text or call via MS Teams with question

## 2023-01-27 NOTE — PROGRESS NOTE ADULT - SUBJECTIVE AND OBJECTIVE BOX
Date of Admission: 23    Interval History: Patient resting in bed, in NAD. Reports feeling better, denies SOB. Still c/o B/L foot pain but states it has improved as well. Remains on Bumex gtt and 3% hypertonic saline. Creat improved, 24 UOP 3.1 L with net negative 2.2 L. Remains overloaded.     Chief Complaint: Patient is a 57y old  Female who presents with a chief complaint of sob (2023 14:03)    HISTORY OF PRESENT ILLNESS: 57 year old female with PMH of COPD on 4 L home O2, CORNELIUS on CPAP, HFrEF (19% on TTE 2022, s/p ICD, pulmonary HTN, CAD s/p PCI to RCA)1, HTN, HLD, CVA with residual LLE weakness, DM, active smoker on home O2, presented to the ED for foot pain.   She reports that pain started in bilateral legs two days ago. She also noticed increased swelling in both legs. Pain was so intense, she could no longer ambulate or touch her feet. Symptoms are worse on her L leg. She also reports that her chronic shortness of breath has been worse than usual. She also endorses that she generally feels more swollen than usual.   Patient admitted for bilateral LE edema, CHF Exacerbation vs. Cellulitis (less likely)   Patient does not know any of her meds or doses; verify with [employer] in the AM. Meds started using surescripts. (2023 03:19)      Patient well known to heart failure team from multiple previous admissions. Patient reports progressively worsening LE edema and tenderness x1 week. Reports her breathing is baseline (mild SOB 2/2 COPD). Endorses home medication compliance but is unaware of which medications she takes. She believes she is maintaining a low Na diet at home, however, she stated she orders Chinese food often. Denies chest pain, palpitations, n/v. +early satiety +decrease in appetite +cough.      PAST MEDICAL & SURGICAL HISTORY:  Hypertension  Hyperlipidemia  Anxiety and depression  COPD, severe  CHF (congestive heart failure)  Cerebrovascular accident (CVA)Multiple  Type 2 diabetes mellitus  CKD (chronic kidney disease), stage II  No significant past surgical history        FAMILY HISTORY:  No pertinent family history of premature cardiovascular disease in first degree relatives.  Mother:  of cancer at 65  Father:  of an overdose at 50    SOCIAL HISTORY: Former smoker- states she quit 1 week ago. Denies alcohol or drug use.       Allergies  No Known Allergies    Intolerances      PHYSICAL EXAM:  Vital Signs Last 24 Hrs  T(C): 36.4 (2023 12:34), Max: 36.4 (2023 20:25)  T(F): 97.6 (2023 12:34), Max: 97.6 (2023 20:25)  HR: 91 (2023 12:34) (74 - 91)  BP: 97/51 (2023 12:34) (97/51 - 108/61)  BP(mean): --  RR: 18 (2023 12:34) (18 - 18)  SpO2: --      General Appearance: normal for age and gender. 	  Neck: unable to assess due to body habitus  Eyes: Extra Ocular muscles intact.   Cardiovascular: regular rate and rhythm S1 S2,+2-3 B/L edema up to thighs  Respiratory: +b/l crackles   Psychiatry: Alert and oriented x 3, Mood & affect appropriate  Gastrointestinal:  Soft, Non-tender  Skin/Integumentary : No rashes, No ecchymoses, No cyanosis	  Neurologic: Non-focal  Musculoskeletal/ extremities: Normal range of motion, No clubbing, cyanosis   Vascular: Peripheral pulses palpable 2+ bilaterally    CARDIAC MARKERS:  Serum Pro-Brain Natriuretic Peptide: 4892 pg/mL (22 @ 10:50)      TELEMETRY EVENTS: 	    EC23    Ventricular Rate 112 BPM  Atrial Rate 112 BPM  P-R Interval 172 ms  QRS Duration 134 ms  Q-T Interval 356 ms  QTC Calculation(Bazett) 485 ms  P Axis 54 degrees  R Axis 202 degrees  T Axis 51 degrees    Diagnosis Line Sinus tachycardia  Non-specific intra-ventricular conduction block  Lateral infarct , age undetermined  Abnormal ECG    Confirmed by Asad Buitrago ([id number]) on 2023 8:04:59 AM        PREVIOUS DIAGNOSTIC TESTING:      TTE 22      Summary:   1. Moderately decreased global left ventricular systolic function with   estimated EF of 25-30% with increased wall thickness.   2. The left ventricular diastolic function could not be assessed in this   study.   3. Mildly enlarged right ventricle (RVEDD = 4.3cm) with moderately   reduced function.   4. Diastolic septal wall flattening consistent with RV volume overload.   5. Mild biatrial enlargement.   6. Mitral valve with bileaflet tethering and resultant mild   regurgitation.   7. Severe tricuspid regurgitation with hepatic vein systolic flow   reversal.   8. Sclerotic aortic valve with normal opening and mild regurgitation.   9. Severe pulmonary hypertension. PASP is at least 65mmHg; this may be   underestimated due to severity of tricuspid regurgitation.  10. Moderate sized pericardial effusion localized posterior tothe right   atrium without echocardiographic evidence of tamponade physiology.  11. Compared to prior study, pericardial effusion size is stable;   findings are overall similar (pHTN severity was likely underestimated on   prior study 2/2 severe TR).    	    Home Medications:  Albuterol (Eqv-ProAir HFA) 90 mcg/inh inhalation aerosol: 2 puff(s) inhaled every 6 hours, As Needed (2021 11:22)  aspirin 81 mg oral tablet: 1 tab(s) orally once a day (2021 11:22)  fenofibrate 145 mg oral tablet: 1 tab(s) orally once a day (2021 11:22)  glipiZIDE 10 mg oral tablet: 1 tab(s) orally 2 times a day (2021 11:22)  Lovaza 1000 mg oral capsule: 2 cap(s) orally 2 times a day (2021 14:52)  metFORMIN 1000 mg oral tablet: 1 tab(s) orally 2 times a day Do not take until  (2021 12:58)  Plavix 75 mg oral tablet: 1 tab(s) orally once a day (2021 11:22)  Vitamin D2 1.25 mg (50,000 intl units) oral capsule: 1 cap(s) orally once a week (2021 14:52)    MEDICATIONS  (STANDING):  aspirin  chewable 81 milliGRAM(s) Oral daily  atorvastatin 80 milliGRAM(s) Oral at bedtime  budesonide 160 MICROgram(s)/formoterol 4.5 MICROgram(s) Inhaler 2 Puff(s) Inhalation two times a day  chlorhexidine 4% Liquid 1 Application(s) Topical <User Schedule>  clopidogrel Tablet 75 milliGRAM(s) Oral daily  enoxaparin Injectable 40 milliGRAM(s) SubCutaneous daily  fenofibrate Tablet 145 milliGRAM(s) Oral daily  furosemide   Injectable 40 milliGRAM(s) IV Push two times a day  magnesium sulfate  IVPB 2 Gram(s) IV Intermittent once  metoprolol succinate ER 50 milliGRAM(s) Oral daily  omega-3-Acid Ethyl Esters 2 Gram(s) Oral two times a day  pantoprazole    Tablet 40 milliGRAM(s) Oral before breakfast  sacubitril 49 mG/valsartan 51 mG 1 Tablet(s) Oral two times a day    MEDICATIONS  (PRN):  ALBUTerol    90 MICROgram(s) HFA Inhaler 2 Puff(s) Inhalation every 6 hours PRN Shortness of Breath and/or Wheezing

## 2023-01-27 NOTE — DISCHARGE NOTE NURSING/CASE MANAGEMENT/SOCIAL WORK - NSDCPEFALRISK_GEN_ALL_CORE
For information on Fall & Injury Prevention, visit: [email or web address] OR  [email or web address] OR  https://www.cdc.gov/steadi/patient.html

## 2023-01-27 NOTE — PROGRESS NOTE ADULT - SUBJECTIVE AND OBJECTIVE BOX
WILLIAN MARY 57y Female  MRN#: [id number]  Hospital Day: 8d    Pt is currently admitted with the primary diagnosis of shortness of breath    57 year old female with PMH of COPD on 4 L home O2, CORNELIUS on CPAP, HFrEF (19% on TTE 02/2022, s/p ICD, pulmonary HTN, CAD s/p PCI to RCA)1, HTN, HLD, CVA with residual LLE weakness, DM, active smoker on home O2, presented to the ED for foot pain.     She reports that pain started in bilateral legs two days ago. She also noticed increased swelling in both legs. Pain was so intense, she could no longer ambulate or touch her feet. Symptoms are worse on her L leg. She also reports that her chronic shortness of breath has been worse than usual. She also endorses that she generally feels more swollen than usual.     SUBJECTIVE  Overnight events: none  Subjective complaints: Reports improvement in swelling and shortness of breath    Present Today:   - Wilkerson:  No [ x ], Yes [   ] : Indication:     - Type of IV Access:       .. CVC/Piccline:  No [ x ], Yes [   ] : Indication:       .. Midline: No [ x ], Yes [   ] : Indication:                                             ----------------------------------------------------------  OBJECTIVE    VITAL SIGNS: Last 24 Hours  T(C): 36.4 (27 Jan 2023 12:34), Max: 36.4 (26 Jan 2023 20:25)  T(F): 97.6 (27 Jan 2023 12:34), Max: 97.6 (26 Jan 2023 20:25)  HR: 91 (27 Jan 2023 12:34) (74 - 91)  BP: 97/51 (27 Jan 2023 12:34) (97/51 - 108/61)  BP(mean): --  RR: 18 (27 Jan 2023 12:34) (18 - 18)  SpO2: --      01-26-23 @ 07:01  -  01-27-23 @ 07:00  --------------------------------------------------------  IN: 881 mL / OUT: 3150 mL / NET: -2269 mL    01-27-23 @ 07:01  -  01-27-23 @ 13:07  --------------------------------------------------------  IN: 150 mL / OUT: 1250 mL / NET: -1100 mL      PHYSICAL EXAM:  General: Chronically ill-appearing in NAD.  HEENT: Normocephalic, nontraumatic. PERRL.  LUNGS: Decreased breat sounds at b/l bases No wheezes, rales, or rhonchi.  HEART: RRR. No murmurs, rubs, or gallops.  ABDOMEN: Soft, nontender, nondistended. + bowel sounds.  EXT: Pulses palpable x 4. +edema in all 4 extremities.  NEURO: AAOx4.  SKIN: Warm, dry.    PAST MEDICAL & SURGICAL HISTORY  Hypertension  Hyperlipidemia  Anxiety and depression  COPD, severe  CHF (congestive heart failure)  Cerebrovascular accident (CVA) multiple  Type 2 diabetes mellitus  CKD (chronic kidney disease), stage II  No significant past surgical history                                            -----------------------------------------------------------  ALLERGIES:  No Known Allergies                                            ------------------------------------------------------------    HOME MEDICATIONS  Home Medications:  Farxiga 10 mg oral tablet: 1 tab(s) orally once a day (20 Jan 2023 11:30)  fenofibrate 145 mg oral tablet: 1 tab(s) orally once a day (20 Jan 2023 11:30)  magnesium gluconate 500 mg oral tablet: 1 tab(s) orally 2 times a day (20 Jan 2023 11:30)  metoprolol succinate 100 mg oral tablet, extended release: 1 tab(s) orally once a day (20 Jan 2023 11:30)  pantoprazole 40 mg oral delayed release tablet: 1 tab(s) orally once a day (before a meal) (20 Jan 2023 11:30)  Synthroid 25 mcg (0.025 mg) oral tablet: 1 tab(s) orally once a day (20 Jan 2023 11:30)                           MEDICATIONS:  STANDING MEDICATIONS  atorvastatin 80 milliGRAM(s) Oral at bedtime  budesonide 160 MICROgram(s)/formoterol 4.5 MICROgram(s) Inhaler 2 Puff(s) Inhalation two times a day  buMETAnide Infusion 1 mG/Hr IV Continuous <Continuous>  clopidogrel Tablet 75 milliGRAM(s) Oral daily  dextrose 5%. 1000 milliLiter(s) IV Continuous <Continuous>  dextrose 5%. 1000 milliLiter(s) IV Continuous <Continuous>  dextrose 50% Injectable 25 Gram(s) IV Push once  dextrose 50% Injectable 12.5 Gram(s) IV Push once  dextrose 50% Injectable 25 Gram(s) IV Push once  enoxaparin Injectable 40 milliGRAM(s) SubCutaneous every 24 hours  fenofibrate Tablet 145 milliGRAM(s) Oral daily  gabapentin 100 milliGRAM(s) Oral two times a day  glucagon  Injectable 1 milliGRAM(s) IntraMuscular once  insulin lispro (ADMELOG) corrective regimen sliding scale   SubCutaneous three times a day before meals  levothyroxine 25 MICROGram(s) Oral daily  magnesium oxide 400 milliGRAM(s) Oral three times a day with meals  metoprolol succinate  milliGRAM(s) Oral daily  pantoprazole    Tablet 40 milliGRAM(s) Oral before breakfast  polyethylene glycol 3350 17 Gram(s) Oral two times a day  senna 2 Tablet(s) Oral at bedtime  sodium chloride 3%. 150 milliLiter(s) IV Continuous <Continuous>  sodium chloride 3%. 150 milliLiter(s) IV Continuous <Continuous>  sodium chloride 3%. 150 milliLiter(s) IV Continuous <Continuous>  sodium chloride 3%. 150 milliLiter(s) IV Continuous <Continuous>  sodium chloride 3%. 150 milliLiter(s) IV Continuous <Continuous>    PRN MEDICATIONS  dextrose Oral Gel 15 Gram(s) Oral once PRN  melatonin 5 milliGRAM(s) Oral at bedtime PRN  morphine  - Injectable 4 milliGRAM(s) IV Push every 4 hours PRN  oxyCODONE    IR 5 milliGRAM(s) Oral every 4 hours PRN                                            ------------------------------------------------------------    LABS:                        9.3    3.78  )-----------( 156      ( 27 Jan 2023 07:14 )             29.0     01-27    142  |  102  |  37<H>  ----------------------------<  98  4.6   |  33<H>  |  1.5    Ca    8.7      27 Jan 2023 07:14  Mg     1.4     01-27    TPro  6.4  /  Alb  3.2<L>  /  TBili  1.1  /  DBili  x   /  AST  15  /  ALT  <5  /  AlkPhos  62  01-27    Culture - Blood (collected 25 Jan 2023 06:46)  Source: .Blood None  Preliminary Report (26 Jan 2023 13:02):    No growth to date.    CAPILLARY BLOOD GLUCOSE  POCT Blood Glucose.: 126 mg/dL (27 Jan 2023 11:49)  POCT Blood Glucose.: 95 mg/dL (27 Jan 2023 07:56)  POCT Blood Glucose.: 142 mg/dL (26 Jan 2023 21:45)  POCT Blood Glucose.: 116 mg/dL (26 Jan 2023 17:05)                                            -------------------------------------------------------------  RADIOLOGY:    < from: CT Chest No Cont (01.24.23 @ 11:26) >  IMPRESSION:  Since  12/3/2022:  Extensive soft tissue swelling left chest wall, involving adjacent breast is noted, similar to previous study. No discrete fluid collections or air bubbles.  Increasing amounts of anasarca throughout the thorax are also noted.  Stable pericardial effusion widest diameter 2.3 cm.  Bilateral groundglass opacities with septal thickening is noted suggesting interstitial edema      < from: Xray Chest 1 View- PORTABLE-Urgent (Xray Chest 1 View- PORTABLE-Urgent .) (01.21.23 @ 14:42) >  Impression: Stable pulmonary congestion. No pneumothorax      < from: VA Duplex Lower Extrem Arterial, Bilat (01.19.23 @ 16:57) >  IMPRESSION: No flow is visualized in distal right SFA and proximal and mid left SFA. Reconstitution in the popliteal artery bilaterally with diminished flow in tibial arteries.      < from: VA Duplex Lower Ext Vein Scan, Bilat (01.18.23 @ 22:06) >  Impression:  No evidence of deep venous thrombosis or superficial thrombophlebitis in the bilateral lower extremities.  Difficult to compress bilateral common femoral veins due to patient's lack of tolerance.                                            --------------------------------------------------------------

## 2023-01-27 NOTE — PROGRESS NOTE ADULT - ASSESSMENT
Assessment: 57 year old female with PMH of COPD on 4 L home O2, CORNELIUS on CPAP, HFrEF, s/p ICD, pulmonary HTN, CAD s/p PCI to RCA)1, HTN, HLD, CVA with residual LLE weakness, DM, active smoker on home O2, presented to the ED for foot pain. Patient admitted for bilateral LE edema, CHF Exacerbation vs. Cellulitis. Heart failure team consulted to assist with fluid overload/acute decompensated heart failure management in the setting of JEMIMA.     Plan:  Patient remains grossly fluid overloaded on exam  POCUS exam- IVC 2.4cm dilated, non collapsing with respirations  Continue Bumex gtt at 1 mg/hr  Continue 3% Hypertonic Saline 150ml BID (If infused throughout a central line, run over one hour, if a peripheral line is to be used run over 3 hours)  Continue home BB  Resume home Entresto and spironolactone when hyperkalemia resolves/ BP stable  Get BMP twice daily with magnesium    Mag 1.4- patient initially refused IV magnesium but after patient education, she's now amenable. Give 2mg IV magnesium x2, recheck BMP later today after supplementation.   Maintain potassium >4.0, Mg >2.2  Strict intake and output  Daily weight   F/u ferritin- If iron deficient (ferritin < 100, or ferritin 100-300 and iron saturation <20%), would give IV iron sucrose as 200 mg iv daily over one hour for 5 days  ICD placement in 3 weeks as per EP  Plan discussed with patient and primary team  Will continue to follow Assessment: 57 year old female with PMH of COPD on 4 L home O2, CORNELIUS on CPAP, HFrEF, s/p ICD, pulmonary HTN, CAD s/p PCI to RCA)1, HTN, HLD, CVA with residual LLE weakness, DM, active smoker on home O2, presented to the ED for foot pain. Patient admitted for bilateral LE edema, CHF Exacerbation vs. Cellulitis. Heart failure team consulted to assist with fluid overload/acute decompensated heart failure management in the setting of JEMIMA.     Plan:  Patient remains grossly fluid overloaded on exam  POCUS exam- IVC 2.4cm dilated, non collapsing with respiration  Continue Bumex gtt at 1 mg/hr  Continue 3% Hypertonic Saline 150ml BID (If infused throughout a central line, run over one hour, if a peripheral line is to be used run over 3 hours)  Continue home BB  Resume home Entresto and spironolactone when hyperkalemia resolves/ BP stable  Get BMP twice daily with magnesium    Mag 1.4- patient initially refused IV magnesium but after patient education, she's now amenable. Give 2mg IV magnesium x2, recheck BMP later today after supplementation.   Maintain potassium >4.0, Mg >2.2  Strict intake and output  Daily weight   Iron deficient- Give IV iron sucrose as 200 mg iv daily for 5 days  ICD placement in 3 weeks as per EP  Vascular cardiology following for PVD   Plan discussed with patient and primary team  Will continue to follow.

## 2023-01-27 NOTE — PROGRESS NOTE ADULT - ASSESSMENT
57 year old female with PMH of COPD on 4 L home O2, CORNELIUS on CPAP, HFrEF (19% on TTE 02/2022, s/p ICD, pulmonary HTN, CAD s/p PCI to RCA)1, HTN, HLD, CVA with residual LLE weakness, DM, active smoker on home O2 4L NC, presented to the ED for foot pain.  Patient does not know any of her meds or doses    SOB associated with  severe lower extremity edema due to acute on chronic HFrEF (19%)  Fluid Overload  medication noncompliance   - remains fluid overloaded clinically but improving   - Appreciate HF Consult -> Hypertonic Saline and Bumex Drip   - f/u K/Mg/Cr daily   * pt was on torsemide 20mg po daily at home, holding home spironolactone   - Cont entresto   - Cont metoprolol   - EP consult appreciated: plan for ICD per Dr Soto, continue with telemetry monitoring for now  - f/u HF  - start magnesium 400 q8H and IV repletion as pt at times refuses IV infusion    Severe bilateral PAD  CAD s/p PCI / HTN   History of CVA  - Arterial duplex:  a) No flow is visualized in distal right SFA and proximal and mid left SFA.  b) Reconstitution in the popliteal artery bilaterally with diminished flow in tibial arteries.  -vascular cardiology on board, recommend cross sectional imaging once renal fxn improves  - c/w statin and plavix     AICD Site Infection PRIOR h/x   - now removed, no erythema or drainage around site   - CT chest reviewed not suggestive of collection in lt chest/breast area  - Blood cx :Staph haemolyticus in one bottle Likely contamination.    -Repeat blood cxs NGTD  - will need ICD placement with EP    hx of chronic hypoxic resp failure on 4 liter oxygen  due to COPD - stable   CORNELIUS  - Cont inhalers, CPAP     JEMIMA 2/2 HFrEF Exacerbation/diuresis   - baseline 1.3-1.4  - c/w diuresis per HF recs   - f/u Cr daily if worsens get Nephro consult f/u all electrolytes daily   - I/O's monitoring      DM-II   Suspected diabetic neuropathy  - hold home farxiga and metformin   - fingersticks and insulin   - started on gabapentin      Chronic Normocytic Anemia - stable    Shoulder pain - likely from immobility - not responsive to oxycodone  - trial of morphine. Ensure bowel regimen and PT    Overall prognosis poor given chronic medical conditions    Pending: diuresis, , ICD with ep and vascular cardiology f/u, pain control   Discussed with patient . 57 year old female with PMH of COPD on 4 L home O2, CORNELIUS on CPAP, HFrEF (19% on TTE 02/2022, s/p ICD, pulmonary HTN, CAD s/p PCI to RCA)1, HTN, HLD, CVA with residual LLE weakness, DM, active smoker on home O2, presented to the ED for foot pain.     # Bilateral LE edema, CHF Exacerbation vs. Cellulitis (less likely)   # Chronic Hypoxic Respiratory Failure- slightly worse   # HFrEF   - No out of proportion tenderness, open wound, or purulence on exam   - subjective dyspnea on home 4L NC   - s/p rocephin and flagyl in ED, but changes consistent with venous stasis, not cellulits; no leukocytosis   - BNP 8880, last TTE EF 25-30%   - CXR showed cardiomegaly and cephalization   - bilateral LE duplex negative   - cefepime stopped ( does not look like cellulitis )  - blood cx +ve for coagulase negative gram + cocci, f/u repeat ( probably contaminated )  - Discussed with EP, pt had life vest 2x before and was non compliant and lost the vests. For new AICD they need ID clearance before given +ve blood cx   - scheduled with EP for ICD on 2/13 - Need ID clearance   - ID rec imaging for explant to ro abscess   - Per HF recs: c/w bumex drip 1mg/hr with hypertonic saline  - Magnesium oxide po 400mg TID. Pt has been at times refusing IV mag citing burning at IV site    #PAD  - No flow is visualized in distal right SFA and proximal and mid left SFA.  - Reconstitution in the popliteal artery bilaterally with diminished flow in tibial arteries.  - Vascular wants CTA when kidney function improved  - c/w plavix and statin     #JEMIMA   - Cr 1.6 (b/l 1.1)  - Hold spironolactone   - Low potassium diet  - Lokelma as needed  - q12h BMP    # COPD - on home 4L O2  # CORNELIUS - on CPAP at home   - c/w symbicort     # CAD s/p PCI   # HTN   - C/w plavix   - Holding entresto   - C/w metoprolol 100mg ER     # DM   - Hold home farxiga and metformin   - Fingersticks and insulin PRN     # Chronic normocytic anemia  - Hb 10.3; f/u Fe studies     # DLD   - C/w Lipitor 80mg qd, Fenofibrate 145mg qd    # Hx of CVA  - c/w plavix     # Other  DVT ppx: Lovenox   GI ppx: protonix   Diet: DASH  Activity: IAT  Dispo: acute

## 2023-01-27 NOTE — DISCHARGE NOTE NURSING/CASE MANAGEMENT/SOCIAL WORK - PATIENT PORTAL LINK FT
You can access the FollowMyHealth Patient Portal offered by [facility] by registering at the following website: [email or web address]. By joining [employer]’s FollowMyHealth portal, you will also be able to view your health information using other applications (apps) compatible with our system.

## 2023-01-28 LAB
ALBUMIN SERPL ELPH-MCNC: 3.4 G/DL — LOW (ref 3.5–5.2)
ALP SERPL-CCNC: 67 U/L — SIGNIFICANT CHANGE UP (ref 30–115)
ALT FLD-CCNC: <5 U/L — SIGNIFICANT CHANGE UP (ref 0–41)
ANION GAP SERPL CALC-SCNC: 11 MMOL/L — SIGNIFICANT CHANGE UP (ref 7–14)
ANION GAP SERPL CALC-SCNC: 14 MMOL/L — SIGNIFICANT CHANGE UP (ref 7–14)
ANISOCYTOSIS BLD QL: SIGNIFICANT CHANGE UP
AST SERPL-CCNC: 16 U/L — SIGNIFICANT CHANGE UP (ref 0–41)
BASOPHILS # BLD AUTO: 0.04 K/UL — SIGNIFICANT CHANGE UP (ref 0–0.2)
BASOPHILS NFR BLD AUTO: 0.9 % — SIGNIFICANT CHANGE UP (ref 0–1)
BILIRUB SERPL-MCNC: 1 MG/DL — SIGNIFICANT CHANGE UP (ref 0.2–1.2)
BUN SERPL-MCNC: 38 MG/DL — HIGH (ref 10–20)
BUN SERPL-MCNC: 42 MG/DL — HIGH (ref 10–20)
CALCIUM SERPL-MCNC: 9 MG/DL — SIGNIFICANT CHANGE UP (ref 8.4–10.5)
CALCIUM SERPL-MCNC: 9.1 MG/DL — SIGNIFICANT CHANGE UP (ref 8.4–10.5)
CHLORIDE SERPL-SCNC: 100 MMOL/L — SIGNIFICANT CHANGE UP (ref 98–110)
CHLORIDE SERPL-SCNC: 99 MMOL/L — SIGNIFICANT CHANGE UP (ref 98–110)
CO2 SERPL-SCNC: 28 MMOL/L — SIGNIFICANT CHANGE UP (ref 17–32)
CO2 SERPL-SCNC: 29 MMOL/L — SIGNIFICANT CHANGE UP (ref 17–32)
CREAT SERPL-MCNC: 1.6 MG/DL — HIGH (ref 0.7–1.5)
CREAT SERPL-MCNC: 1.8 MG/DL — HIGH (ref 0.7–1.5)
EGFR: 33 ML/MIN/1.73M2 — LOW
EGFR: 38 ML/MIN/1.73M2 — LOW
EOSINOPHIL # BLD AUTO: 0.04 K/UL — SIGNIFICANT CHANGE UP (ref 0–0.7)
EOSINOPHIL NFR BLD AUTO: 0.8 % — SIGNIFICANT CHANGE UP (ref 0–8)
GIANT PLATELETS BLD QL SMEAR: PRESENT — SIGNIFICANT CHANGE UP
GLUCOSE BLDC GLUCOMTR-MCNC: 116 MG/DL — HIGH (ref 70–99)
GLUCOSE BLDC GLUCOMTR-MCNC: 118 MG/DL — HIGH (ref 70–99)
GLUCOSE BLDC GLUCOMTR-MCNC: 133 MG/DL — HIGH (ref 70–99)
GLUCOSE BLDC GLUCOMTR-MCNC: 91 MG/DL — SIGNIFICANT CHANGE UP (ref 70–99)
GLUCOSE SERPL-MCNC: 105 MG/DL — HIGH (ref 70–99)
GLUCOSE SERPL-MCNC: 132 MG/DL — HIGH (ref 70–99)
HCT VFR BLD CALC: 29.9 % — LOW (ref 37–47)
HGB BLD-MCNC: 9.5 G/DL — LOW (ref 12–16)
LYMPHOCYTES # BLD AUTO: 0.52 K/UL — LOW (ref 1.2–3.4)
LYMPHOCYTES # BLD AUTO: 11.2 % — LOW (ref 20.5–51.1)
MACROCYTES BLD QL: SIGNIFICANT CHANGE UP
MAGNESIUM SERPL-MCNC: 2 MG/DL — SIGNIFICANT CHANGE UP (ref 1.8–2.4)
MAGNESIUM SERPL-MCNC: 2.1 MG/DL — SIGNIFICANT CHANGE UP (ref 1.8–2.4)
MANUAL SMEAR VERIFICATION: SIGNIFICANT CHANGE UP
MCHC RBC-ENTMCNC: 27.9 PG — SIGNIFICANT CHANGE UP (ref 27–31)
MCHC RBC-ENTMCNC: 31.8 G/DL — LOW (ref 32–37)
MCV RBC AUTO: 87.7 FL — SIGNIFICANT CHANGE UP (ref 81–99)
MONOCYTES # BLD AUTO: 0.44 K/UL — SIGNIFICANT CHANGE UP (ref 0.1–0.6)
MONOCYTES NFR BLD AUTO: 9.5 % — HIGH (ref 1.7–9.3)
NEUTROPHILS # BLD AUTO: 3.29 K/UL — SIGNIFICANT CHANGE UP (ref 1.4–6.5)
NEUTROPHILS NFR BLD AUTO: 70.7 % — SIGNIFICANT CHANGE UP (ref 42.2–75.2)
PLAT MORPH BLD: NORMAL — SIGNIFICANT CHANGE UP
PLATELET # BLD AUTO: 152 K/UL — SIGNIFICANT CHANGE UP (ref 130–400)
POIKILOCYTOSIS BLD QL AUTO: SLIGHT — SIGNIFICANT CHANGE UP
POLYCHROMASIA BLD QL SMEAR: SLIGHT — SIGNIFICANT CHANGE UP
POTASSIUM SERPL-MCNC: 4.6 MMOL/L — SIGNIFICANT CHANGE UP (ref 3.5–5)
POTASSIUM SERPL-MCNC: 4.6 MMOL/L — SIGNIFICANT CHANGE UP (ref 3.5–5)
POTASSIUM SERPL-SCNC: 4.6 MMOL/L — SIGNIFICANT CHANGE UP (ref 3.5–5)
POTASSIUM SERPL-SCNC: 4.6 MMOL/L — SIGNIFICANT CHANGE UP (ref 3.5–5)
PROT SERPL-MCNC: 6.5 G/DL — SIGNIFICANT CHANGE UP (ref 6–8)
RBC # BLD: 3.41 M/UL — LOW (ref 4.2–5.4)
RBC # FLD: 18.3 % — HIGH (ref 11.5–14.5)
RBC BLD AUTO: ABNORMAL
SODIUM SERPL-SCNC: 140 MMOL/L — SIGNIFICANT CHANGE UP (ref 135–146)
SODIUM SERPL-SCNC: 141 MMOL/L — SIGNIFICANT CHANGE UP (ref 135–146)
TARGETS BLD QL SMEAR: SLIGHT — SIGNIFICANT CHANGE UP
VARIANT LYMPHS # BLD: 6.9 % — HIGH (ref 0–5)
WBC # BLD: 4.65 K/UL — LOW (ref 4.8–10.8)
WBC # FLD AUTO: 4.65 K/UL — LOW (ref 4.8–10.8)

## 2023-01-28 PROCEDURE — 99233 SBSQ HOSP IP/OBS HIGH 50: CPT

## 2023-01-28 RX ADMIN — MORPHINE SULFATE 4 MILLIGRAM(S): 50 CAPSULE, EXTENDED RELEASE ORAL at 06:40

## 2023-01-28 RX ADMIN — MORPHINE SULFATE 4 MILLIGRAM(S): 50 CAPSULE, EXTENDED RELEASE ORAL at 06:25

## 2023-01-28 RX ADMIN — MORPHINE SULFATE 4 MILLIGRAM(S): 50 CAPSULE, EXTENDED RELEASE ORAL at 23:00

## 2023-01-28 RX ADMIN — OXYCODONE HYDROCHLORIDE 5 MILLIGRAM(S): 5 TABLET ORAL at 20:25

## 2023-01-28 RX ADMIN — MAGNESIUM OXIDE 400 MG ORAL TABLET 400 MILLIGRAM(S): 241.3 TABLET ORAL at 17:25

## 2023-01-28 RX ADMIN — PANTOPRAZOLE SODIUM 40 MILLIGRAM(S): 20 TABLET, DELAYED RELEASE ORAL at 05:35

## 2023-01-28 RX ADMIN — OXYCODONE HYDROCHLORIDE 5 MILLIGRAM(S): 5 TABLET ORAL at 03:53

## 2023-01-28 RX ADMIN — ENOXAPARIN SODIUM 40 MILLIGRAM(S): 100 INJECTION SUBCUTANEOUS at 05:35

## 2023-01-28 RX ADMIN — MORPHINE SULFATE 4 MILLIGRAM(S): 50 CAPSULE, EXTENDED RELEASE ORAL at 13:15

## 2023-01-28 RX ADMIN — MORPHINE SULFATE 4 MILLIGRAM(S): 50 CAPSULE, EXTENDED RELEASE ORAL at 14:51

## 2023-01-28 RX ADMIN — MORPHINE SULFATE 4 MILLIGRAM(S): 50 CAPSULE, EXTENDED RELEASE ORAL at 18:55

## 2023-01-28 RX ADMIN — MORPHINE SULFATE 4 MILLIGRAM(S): 50 CAPSULE, EXTENDED RELEASE ORAL at 02:20

## 2023-01-28 RX ADMIN — GABAPENTIN 100 MILLIGRAM(S): 400 CAPSULE ORAL at 17:25

## 2023-01-28 RX ADMIN — MAGNESIUM OXIDE 400 MG ORAL TABLET 400 MILLIGRAM(S): 241.3 TABLET ORAL at 09:41

## 2023-01-28 RX ADMIN — Medication 145 MILLIGRAM(S): at 13:11

## 2023-01-28 RX ADMIN — Medication 5 MILLIGRAM(S): at 03:54

## 2023-01-28 RX ADMIN — Medication 100 MILLIGRAM(S): at 09:41

## 2023-01-28 RX ADMIN — SENNA PLUS 2 TABLET(S): 8.6 TABLET ORAL at 21:23

## 2023-01-28 RX ADMIN — ATORVASTATIN CALCIUM 80 MILLIGRAM(S): 80 TABLET, FILM COATED ORAL at 21:23

## 2023-01-28 RX ADMIN — GABAPENTIN 100 MILLIGRAM(S): 400 CAPSULE ORAL at 05:35

## 2023-01-28 RX ADMIN — MORPHINE SULFATE 4 MILLIGRAM(S): 50 CAPSULE, EXTENDED RELEASE ORAL at 02:35

## 2023-01-28 RX ADMIN — MAGNESIUM OXIDE 400 MG ORAL TABLET 400 MILLIGRAM(S): 241.3 TABLET ORAL at 13:11

## 2023-01-28 RX ADMIN — Medication 25 MICROGRAM(S): at 05:35

## 2023-01-28 RX ADMIN — CLOPIDOGREL BISULFATE 75 MILLIGRAM(S): 75 TABLET, FILM COATED ORAL at 13:11

## 2023-01-28 NOTE — PROGRESS NOTE ADULT - ASSESSMENT
57 year old female with PMH of COPD on 4 L home O2, CORNELIUS on CPAP, HFrEF (19% on TTE 02/2022, s/p ICD, pulmonary HTN, CAD s/p PCI to RCA)1, HTN, HLD, CVA with residual LLE weakness, DM, active smoker on home O2 4L NC, presented to the ED for foot pain.  Patient does not know any of her meds or doses    SOB associated with  severe lower extremity edema due to acute on chronic HFrEF (19%)  Fluid Overload  medication noncompliance   - remains fluid overloaded clinically but improving   - Appreciate HF Consult -> Hypertonic Saline and Bumex Drip   - f/u K/Mg/Cr twice a day  * pt was on torsemide 20mg po daily at home, holding home spironolactone   - Cont entresto   - Cont metoprolol   - EP consult appreciated: plan for ICD per Dr Soto, continue with telemetry monitoring for now  - f/u HF  - magnesium 400 q8H and IV repletion as needed     Severe bilateral PAD  CAD s/p PCI / HTN   History of CVA  - Arterial duplex:  a) No flow is visualized in distal right SFA and proximal and mid left SFA.  b) Reconstitution in the popliteal artery bilaterally with diminished flow in tibial arteries.  -vascular cardiology on board, recommend cross sectional imaging once renal fxn improves  - c/w statin and plavix     AICD Site Infection PRIOR h/x   - now removed, no erythema or drainage around site   - CT chest reviewed not suggestive of collection in lt chest/breast area  - Blood cx :Staph haemolyticus in one bottle Likely contamination.    -Repeat blood cxs NGTD  - will need ICD placement with EP    hx of chronic hypoxic resp failure on 4 liter oxygen  due to COPD - stable   CORNELIUS  - Cont inhalers, CPAP     JEMIMA 2/2 HFrEF Exacerbation/diuresis   - baseline 1.3-1.4  - c/w diuresis per HF recs   - f/u Cr daily if worsens get Nephro consult f/u all electrolytes  - I/O's monitoring      DM-II   Suspected diabetic neuropathy  - hold home farxiga and metformin   - fingersticks and insulin   - started on gabapentin      Chronic Normocytic Anemia - stable    Shoulder pain - likely from immobility - not responsive to oxycodone  - trial of morphine. Ensure bowel regimen and PT    Overall prognosis poor given chronic medical conditions    Pending: diuresis, , ICD with ep and vascular cardiology f/u, pain control   Discussed with patient .

## 2023-01-28 NOTE — PROGRESS NOTE ADULT - SUBJECTIVE AND OBJECTIVE BOX
USMAN WILLIAN  57y Female    CHIEF COMPLAINT:    Patient is a 57y old  Female who presents with a chief complaint of shortness of breath (2023 13:05)    INTERVAL HPI/OVERNIGHT EVENTS:    Patient seen and examined. No acute events overnight. Overall unchanged     ROS: All other systems are negative.    Vital Signs:    T(F): 97.6 (23 @ 14:32), Max: 98.1 (23 @ 20:30)  HR: 75 (23 @ 14:32) (74 - 77)  BP: 114/68 (23 @ 14:32) (98/59 - 114/68)  RR: 18 (23 @ 14:32) (18 - 18)    2023 07:01  -  2023 07:00  --------------------------------------------------------  IN: 420 mL / OUT: 3350 mL / NET: -2930 mL    2023 07:01  -  2023 16:07  --------------------------------------------------------  IN: 0 mL / OUT: 400 mL / NET: -400 mL    Daily Weight in k (2023 05:08)    POCT Blood Glucose.: 118 mg/dL (2023 11:50)  POCT Blood Glucose.: 91 mg/dL (2023 07:52)  POCT Blood Glucose.: 154 mg/dL (2023 21:20)  POCT Blood Glucose.: 116 mg/dL (2023 16:23)    PHYSICAL EXAM:    GENERAL:  NAD chronically ill appearing   SKIN: No rashes or lesions  HEENT: Atraumatic. Normocephalic.    NECK: Supple, No JVD.    PULMONARY: decreased breath sounds B/L. No wheezing   CVS: Normal S1, S2. Rate and Rhythm are regular.   ABDOMEN/GI: Soft, Nontender, Nondistended   MSK:  improving LE edema  NEUROLOGIC: No clubbing or cyanosis   PSYCH: Alert & oriented x 3     Consultant(s) Notes Reviewed:  [x ] YES  [ ] NO  Care Discussed with Consultants/Other Providers [ x] YES  [ ] NO    LABS:                        9.5    4.65  )-----------( 152      ( 2023 04:39 )             29.9    140  |  100  |  38<H>  ----------------------------<  105<H>  4.6   |  29  |  1.6<H>    Ca    9.0      2023 04:39  Mg     2.1         TPro  6.5  /  Alb  3.4<L>  /  TBili  1.0  /  DBili  x   /  AST  16  /  ALT  <5  /  AlkPhos  67      Serum Pro-Brain Natriuretic Peptide: 21327 pg/mL (23 @ 06:11)    RADIOLOGY &ADDITIONAL TESTS:  Imaging or report Personally Reviewed:  [x] YES  [ ] NO  EKG reviewed: [x] YES  [ ] NO    Medications:  Standing  atorvastatin 80 milliGRAM(s) Oral at bedtime  budesonide 160 MICROgram(s)/formoterol 4.5 MICROgram(s) Inhaler 2 Puff(s) Inhalation two times a day  buMETAnide Infusion 1 mG/Hr IV Continuous <Continuous>  clopidogrel Tablet 75 milliGRAM(s) Oral daily  dextrose 5%. 1000 milliLiter(s) IV Continuous <Continuous>  dextrose 5%. 1000 milliLiter(s) IV Continuous <Continuous>  dextrose 50% Injectable 25 Gram(s) IV Push once  dextrose 50% Injectable 12.5 Gram(s) IV Push once  dextrose 50% Injectable 25 Gram(s) IV Push once  enoxaparin Injectable 40 milliGRAM(s) SubCutaneous every 24 hours  fenofibrate Tablet 145 milliGRAM(s) Oral daily  gabapentin 100 milliGRAM(s) Oral two times a day  glucagon  Injectable 1 milliGRAM(s) IntraMuscular once  insulin lispro (ADMELOG) corrective regimen sliding scale   SubCutaneous three times a day before meals  levothyroxine 25 MICROGram(s) Oral daily  magnesium oxide 400 milliGRAM(s) Oral three times a day with meals  metoprolol succinate  milliGRAM(s) Oral daily  pantoprazole    Tablet 40 milliGRAM(s) Oral before breakfast  polyethylene glycol 3350 17 Gram(s) Oral two times a day  senna 2 Tablet(s) Oral at bedtime  sodium chloride 3%. 150 milliLiter(s) IV Continuous <Continuous>  sodium chloride 3%. 150 milliLiter(s) IV Continuous <Continuous>  sodium chloride 3%. 150 milliLiter(s) IV Continuous <Continuous>  sodium chloride 3%. 150 milliLiter(s) IV Continuous <Continuous>  sodium chloride 3%. 150 milliLiter(s) IV Continuous <Continuous>    PRN Meds  dextrose Oral Gel 15 Gram(s) Oral once PRN  melatonin 5 milliGRAM(s) Oral at bedtime PRN  morphine  - Injectable 4 milliGRAM(s) IV Push every 4 hours PRN  oxyCODONE    IR 5 milliGRAM(s) Oral every 4 hours PRN

## 2023-01-29 LAB
ALBUMIN SERPL ELPH-MCNC: 3.6 G/DL — SIGNIFICANT CHANGE UP (ref 3.5–5.2)
ALP SERPL-CCNC: 64 U/L — SIGNIFICANT CHANGE UP (ref 30–115)
ALT FLD-CCNC: <5 U/L — SIGNIFICANT CHANGE UP (ref 0–41)
ANION GAP SERPL CALC-SCNC: 6 MMOL/L — LOW (ref 7–14)
ANION GAP SERPL CALC-SCNC: 9 MMOL/L — SIGNIFICANT CHANGE UP (ref 7–14)
AST SERPL-CCNC: 17 U/L — SIGNIFICANT CHANGE UP (ref 0–41)
BASOPHILS # BLD AUTO: 0.09 K/UL — SIGNIFICANT CHANGE UP (ref 0–0.2)
BASOPHILS NFR BLD AUTO: 2 % — HIGH (ref 0–1)
BILIRUB SERPL-MCNC: 1.2 MG/DL — SIGNIFICANT CHANGE UP (ref 0.2–1.2)
BUN SERPL-MCNC: 44 MG/DL — HIGH (ref 10–20)
BUN SERPL-MCNC: 47 MG/DL — HIGH (ref 10–20)
CALCIUM SERPL-MCNC: 9.1 MG/DL — SIGNIFICANT CHANGE UP (ref 8.4–10.4)
CALCIUM SERPL-MCNC: 9.5 MG/DL — SIGNIFICANT CHANGE UP (ref 8.4–10.5)
CHLORIDE SERPL-SCNC: 94 MMOL/L — LOW (ref 98–110)
CHLORIDE SERPL-SCNC: 96 MMOL/L — LOW (ref 98–110)
CO2 SERPL-SCNC: 32 MMOL/L — SIGNIFICANT CHANGE UP (ref 17–32)
CO2 SERPL-SCNC: 32 MMOL/L — SIGNIFICANT CHANGE UP (ref 17–32)
CREAT SERPL-MCNC: 1.7 MG/DL — HIGH (ref 0.7–1.5)
CREAT SERPL-MCNC: 1.7 MG/DL — HIGH (ref 0.7–1.5)
EGFR: 35 ML/MIN/1.73M2 — LOW
EGFR: 35 ML/MIN/1.73M2 — LOW
EOSINOPHIL # BLD AUTO: 0.11 K/UL — SIGNIFICANT CHANGE UP (ref 0–0.7)
EOSINOPHIL NFR BLD AUTO: 2.4 % — SIGNIFICANT CHANGE UP (ref 0–8)
GLUCOSE BLDC GLUCOMTR-MCNC: 108 MG/DL — HIGH (ref 70–99)
GLUCOSE BLDC GLUCOMTR-MCNC: 124 MG/DL — HIGH (ref 70–99)
GLUCOSE BLDC GLUCOMTR-MCNC: 185 MG/DL — HIGH (ref 70–99)
GLUCOSE BLDC GLUCOMTR-MCNC: 95 MG/DL — SIGNIFICANT CHANGE UP (ref 70–99)
GLUCOSE SERPL-MCNC: 161 MG/DL — HIGH (ref 70–99)
GLUCOSE SERPL-MCNC: 96 MG/DL — SIGNIFICANT CHANGE UP (ref 70–99)
HCT VFR BLD CALC: 31 % — LOW (ref 37–47)
HGB BLD-MCNC: 9.8 G/DL — LOW (ref 12–16)
IMM GRANULOCYTES NFR BLD AUTO: 0.2 % — SIGNIFICANT CHANGE UP (ref 0.1–0.3)
LYMPHOCYTES # BLD AUTO: 0.83 K/UL — LOW (ref 1.2–3.4)
LYMPHOCYTES # BLD AUTO: 18.3 % — LOW (ref 20.5–51.1)
MAGNESIUM SERPL-MCNC: 2 MG/DL — SIGNIFICANT CHANGE UP (ref 1.8–2.4)
MAGNESIUM SERPL-MCNC: 2.1 MG/DL — SIGNIFICANT CHANGE UP (ref 1.8–2.4)
MCHC RBC-ENTMCNC: 27.8 PG — SIGNIFICANT CHANGE UP (ref 27–31)
MCHC RBC-ENTMCNC: 31.6 G/DL — LOW (ref 32–37)
MCV RBC AUTO: 88.1 FL — SIGNIFICANT CHANGE UP (ref 81–99)
MONOCYTES # BLD AUTO: 0.66 K/UL — HIGH (ref 0.1–0.6)
MONOCYTES NFR BLD AUTO: 14.5 % — HIGH (ref 1.7–9.3)
NEUTROPHILS # BLD AUTO: 2.84 K/UL — SIGNIFICANT CHANGE UP (ref 1.4–6.5)
NEUTROPHILS NFR BLD AUTO: 62.6 % — SIGNIFICANT CHANGE UP (ref 42.2–75.2)
NRBC # BLD: 0 /100 WBCS — SIGNIFICANT CHANGE UP (ref 0–0)
PLATELET # BLD AUTO: 150 K/UL — SIGNIFICANT CHANGE UP (ref 130–400)
POTASSIUM SERPL-MCNC: 4.2 MMOL/L — SIGNIFICANT CHANGE UP (ref 3.5–5)
POTASSIUM SERPL-MCNC: SIGNIFICANT CHANGE UP MMOL/L (ref 3.5–5)
POTASSIUM SERPL-SCNC: 4.2 MMOL/L — SIGNIFICANT CHANGE UP (ref 3.5–5)
POTASSIUM SERPL-SCNC: SIGNIFICANT CHANGE UP MMOL/L (ref 3.5–5)
PROT SERPL-MCNC: 6.8 G/DL — SIGNIFICANT CHANGE UP (ref 6–8)
RBC # BLD: 3.52 M/UL — LOW (ref 4.2–5.4)
RBC # FLD: 18.3 % — HIGH (ref 11.5–14.5)
SODIUM SERPL-SCNC: 132 MMOL/L — LOW (ref 135–146)
SODIUM SERPL-SCNC: 137 MMOL/L — SIGNIFICANT CHANGE UP (ref 135–146)
WBC # BLD: 4.54 K/UL — LOW (ref 4.8–10.8)
WBC # FLD AUTO: 4.54 K/UL — LOW (ref 4.8–10.8)

## 2023-01-29 PROCEDURE — 99233 SBSQ HOSP IP/OBS HIGH 50: CPT | Mod: GC

## 2023-01-29 RX ORDER — OXYCODONE HYDROCHLORIDE 5 MG/1
10 TABLET ORAL EVERY 6 HOURS
Refills: 0 | Status: DISCONTINUED | OUTPATIENT
Start: 2023-01-29 | End: 2023-02-04

## 2023-01-29 RX ORDER — IRON SUCROSE 20 MG/ML
200 INJECTION, SOLUTION INTRAVENOUS EVERY 24 HOURS
Refills: 0 | Status: COMPLETED | OUTPATIENT
Start: 2023-01-29 | End: 2023-02-02

## 2023-01-29 RX ADMIN — GABAPENTIN 100 MILLIGRAM(S): 400 CAPSULE ORAL at 17:58

## 2023-01-29 RX ADMIN — ATORVASTATIN CALCIUM 80 MILLIGRAM(S): 80 TABLET, FILM COATED ORAL at 21:18

## 2023-01-29 RX ADMIN — MAGNESIUM OXIDE 400 MG ORAL TABLET 400 MILLIGRAM(S): 241.3 TABLET ORAL at 09:30

## 2023-01-29 RX ADMIN — MORPHINE SULFATE 4 MILLIGRAM(S): 50 CAPSULE, EXTENDED RELEASE ORAL at 03:00

## 2023-01-29 RX ADMIN — GABAPENTIN 100 MILLIGRAM(S): 400 CAPSULE ORAL at 05:46

## 2023-01-29 RX ADMIN — BUMETANIDE 5 MG/HR: 0.25 INJECTION INTRAMUSCULAR; INTRAVENOUS at 21:20

## 2023-01-29 RX ADMIN — MORPHINE SULFATE 4 MILLIGRAM(S): 50 CAPSULE, EXTENDED RELEASE ORAL at 07:11

## 2023-01-29 RX ADMIN — Medication 25 MICROGRAM(S): at 05:46

## 2023-01-29 RX ADMIN — OXYCODONE HYDROCHLORIDE 10 MILLIGRAM(S): 5 TABLET ORAL at 14:21

## 2023-01-29 RX ADMIN — MAGNESIUM OXIDE 400 MG ORAL TABLET 400 MILLIGRAM(S): 241.3 TABLET ORAL at 11:36

## 2023-01-29 RX ADMIN — MORPHINE SULFATE 4 MILLIGRAM(S): 50 CAPSULE, EXTENDED RELEASE ORAL at 09:18

## 2023-01-29 RX ADMIN — CLOPIDOGREL BISULFATE 75 MILLIGRAM(S): 75 TABLET, FILM COATED ORAL at 11:37

## 2023-01-29 RX ADMIN — OXYCODONE HYDROCHLORIDE 10 MILLIGRAM(S): 5 TABLET ORAL at 11:39

## 2023-01-29 RX ADMIN — MORPHINE SULFATE 4 MILLIGRAM(S): 50 CAPSULE, EXTENDED RELEASE ORAL at 03:45

## 2023-01-29 RX ADMIN — Medication 100 MILLIGRAM(S): at 05:46

## 2023-01-29 RX ADMIN — MORPHINE SULFATE 4 MILLIGRAM(S): 50 CAPSULE, EXTENDED RELEASE ORAL at 18:20

## 2023-01-29 RX ADMIN — BUDESONIDE AND FORMOTEROL FUMARATE DIHYDRATE 2 PUFF(S): 160; 4.5 AEROSOL RESPIRATORY (INHALATION) at 21:18

## 2023-01-29 RX ADMIN — SENNA PLUS 2 TABLET(S): 8.6 TABLET ORAL at 21:18

## 2023-01-29 RX ADMIN — Medication 145 MILLIGRAM(S): at 11:37

## 2023-01-29 RX ADMIN — ENOXAPARIN SODIUM 40 MILLIGRAM(S): 100 INJECTION SUBCUTANEOUS at 05:46

## 2023-01-29 RX ADMIN — PANTOPRAZOLE SODIUM 40 MILLIGRAM(S): 20 TABLET, DELAYED RELEASE ORAL at 05:46

## 2023-01-29 RX ADMIN — OXYCODONE HYDROCHLORIDE 5 MILLIGRAM(S): 5 TABLET ORAL at 07:11

## 2023-01-29 RX ADMIN — Medication 1: at 17:56

## 2023-01-29 RX ADMIN — MAGNESIUM OXIDE 400 MG ORAL TABLET 400 MILLIGRAM(S): 241.3 TABLET ORAL at 17:58

## 2023-01-29 RX ADMIN — IRON SUCROSE 110 MILLIGRAM(S): 20 INJECTION, SOLUTION INTRAVENOUS at 11:36

## 2023-01-29 RX ADMIN — MORPHINE SULFATE 4 MILLIGRAM(S): 50 CAPSULE, EXTENDED RELEASE ORAL at 15:45

## 2023-01-29 RX ADMIN — MORPHINE SULFATE 4 MILLIGRAM(S): 50 CAPSULE, EXTENDED RELEASE ORAL at 10:24

## 2023-01-29 NOTE — PROGRESS NOTE ADULT - ASSESSMENT
57 year old female with PMH of COPD on 4 L home O2, CORNELIUS on CPAP, HFrEF (19% on TTE 02/2022, s/p ICD, pulmonary HTN, CAD s/p PCI to RCA)1, HTN, HLD, CVA with residual LLE weakness, DM, active smoker on home O2, presented to the ED for foot pain.     # Bilateral LE edema, CHF Exacerbation vs. Cellulitis (less likely)   # Chronic Hypoxic Respiratory Failure- slightly worse   # HFrEF   - No out of proportion tenderness, open wound, or purulence on exam   - subjective dyspnea on home 4L NC   - s/p rocephin and flagyl in ED, but changes consistent with venous stasis, not cellulits; no leukocytosis   - BNP 8880, last TTE EF 25-30%   - CXR showed cardiomegaly and cephalization   - bilateral LE duplex negative   - cefepime stopped ( does not look like cellulitis )  - blood cx +ve for coagulase negative gram + cocci, f/u repeat ( probably contaminated )  - Discussed with EP, pt had life vest 2x before and was non compliant and lost the vests. For new AICD they need ID clearance before given +ve blood cx   - scheduled with EP for ICD on 2/13 - Need ID clearance   - ID rec imaging for explant to ro abscess   - Per HF recs: c/w bumex drip 1mg/hr with hypertonic saline  - Magnesium oxide po 400mg TID. Pt has been at times refusing IV mag citing burning at IV site    #PAD  - No flow is visualized in distal right SFA and proximal and mid left SFA.  - Reconstitution in the popliteal artery bilaterally with diminished flow in tibial arteries.  - Vascular wants CTA when kidney function improved  - c/w plavix and statin     #JEMIMA   - Cr 1.6 (b/l 1.1)  - Hold spironolactone   - Low potassium diet  - Lokelma as needed  - q12h BMP    # COPD - on home 4L O2  # CORNELIUS - on CPAP at home   - c/w symbicort     # CAD s/p PCI   # HTN   - C/w plavix   - Holding entresto   - C/w metoprolol 100mg ER     # DM   - Hold home farxiga and metformin   - Fingersticks and insulin PRN     # Chronic normocytic anemia  - Hb 10.3; f/u Fe studies     # DLD   - C/w Lipitor 80mg qd, Fenofibrate 145mg qd    # Hx of CVA  - c/w plavix     # Other  DVT ppx: Lovenox   GI ppx: protonix   Diet: DASH  Activity: IAT  Dispo: acute

## 2023-01-29 NOTE — PROGRESS NOTE ADULT - SUBJECTIVE AND OBJECTIVE BOX
WILLIAN MARY 57y Female  MRN#: [id number]  Hospital Day: 10d    Pt is currently admitted with the primary diagnosis of shortness of breath    57 year old female with PMH of COPD on 4 L home O2, CORNELIUS on CPAP, HFrEF (19% on TTE 02/2022, s/p ICD, pulmonary HTN, CAD s/p PCI to RCA)1, HTN, HLD, CVA with residual LLE weakness, DM, active smoker on home O2, presented to the ED for foot pain.     She reports that pain started in bilateral legs two days ago. She also noticed increased swelling in both legs. Pain was so intense, she could no longer ambulate or touch her feet. Symptoms are worse on her L leg. She also reports that her chronic shortness of breath has been worse than usual. She also endorses that she generally feels more swollen than usual.     SUBJECTIVE  Overnight events: None  Subjective complaints: None    Present Today:   - Wilkerson:  No [ x ], Yes [   ] : Indication:     - Type of IV Access:       .. CVC/Piccline:  No [ x ], Yes [   ] : Indication:       .. Midline: No [ x ], Yes [   ] : Indication:                                             ----------------------------------------------------------  OBJECTIVE    VITAL SIGNS: Last 24 Hours  T(C): 36.7 (29 Jan 2023 05:17), Max: 36.7 (29 Jan 2023 05:17)  T(F): 98 (29 Jan 2023 05:17), Max: 98 (29 Jan 2023 05:17)  HR: 86 (29 Jan 2023 05:17) (65 - 86)  BP: 102/62 (29 Jan 2023 05:17) (102/62 - 114/68)  BP(mean): --  RR: 18 (29 Jan 2023 05:17) (18 - 18)  SpO2: 95% (28 Jan 2023 20:22) (95% - 95%)      01-27-23 @ 07:01  -  01-28-23 @ 07:00  --------------------------------------------------------  IN: 420 mL / OUT: 3350 mL / NET: -2930 mL    01-28-23 @ 07:01  -  01-29-23 @ 05:34  --------------------------------------------------------  IN: 800 mL / OUT: 900 mL / NET: -100 mL    PHYSICAL EXAM:  General: Chronically ill-appearing in NAD.  HEENT: Normocephalic, nontraumatic. PERRL.  LUNGS: Decreased breat sounds at b/l bases No wheezes, rales, or rhonchi.  HEART: RRR. No murmurs, rubs, or gallops.  ABDOMEN: Soft, nontender, nondistended. + bowel sounds.  EXT: Pulses palpable x 4. +edema in all 4 extremities.  NEURO: AAOx4.  SKIN: Warm, dry.    PAST MEDICAL & SURGICAL HISTORY  Hypertension  Hyperlipidemia  Anxiety and depression  COPD, severe  CHF (congestive heart failure)  Cerebrovascular accident (CVA) Multiple  Type 2 diabetes mellitus  CKD (chronic kidney disease), stage II  No significant past surgical history                                            -----------------------------------------------------------  ALLERGIES:  No Known Allergies                                            ------------------------------------------------------------    HOME MEDICATIONS  Home Medications:  Farxiga 10 mg oral tablet: 1 tab(s) orally once a day (20 Jan 2023 11:30)  fenofibrate 145 mg oral tablet: 1 tab(s) orally once a day (20 Jan 2023 11:30)  magnesium gluconate 500 mg oral tablet: 1 tab(s) orally 2 times a day (20 Jan 2023 11:30)  metoprolol succinate 100 mg oral tablet, extended release: 1 tab(s) orally once a day (20 Jan 2023 11:30)  pantoprazole 40 mg oral delayed release tablet: 1 tab(s) orally once a day (before a meal) (20 Jan 2023 11:30)  Synthroid 25 mcg (0.025 mg) oral tablet: 1 tab(s) orally once a day (20 Jan 2023 11:30)                           MEDICATIONS:  STANDING MEDICATIONS  atorvastatin 80 milliGRAM(s) Oral at bedtime  budesonide 160 MICROgram(s)/formoterol 4.5 MICROgram(s) Inhaler 2 Puff(s) Inhalation two times a day  buMETAnide Infusion 1 mG/Hr IV Continuous <Continuous>  clopidogrel Tablet 75 milliGRAM(s) Oral daily  dextrose 5%. 1000 milliLiter(s) IV Continuous <Continuous>  dextrose 5%. 1000 milliLiter(s) IV Continuous <Continuous>  dextrose 50% Injectable 25 Gram(s) IV Push once  dextrose 50% Injectable 12.5 Gram(s) IV Push once  dextrose 50% Injectable 25 Gram(s) IV Push once  enoxaparin Injectable 40 milliGRAM(s) SubCutaneous every 24 hours  fenofibrate Tablet 145 milliGRAM(s) Oral daily  gabapentin 100 milliGRAM(s) Oral two times a day  glucagon  Injectable 1 milliGRAM(s) IntraMuscular once  insulin lispro (ADMELOG) corrective regimen sliding scale   SubCutaneous three times a day before meals  levothyroxine 25 MICROGram(s) Oral daily  magnesium oxide 400 milliGRAM(s) Oral three times a day with meals  metoprolol succinate  milliGRAM(s) Oral daily  pantoprazole    Tablet 40 milliGRAM(s) Oral before breakfast  polyethylene glycol 3350 17 Gram(s) Oral two times a day  senna 2 Tablet(s) Oral at bedtime  sodium chloride 3%. 150 milliLiter(s) IV Continuous <Continuous>  sodium chloride 3%. 150 milliLiter(s) IV Continuous <Continuous>  sodium chloride 3%. 150 milliLiter(s) IV Continuous <Continuous>  sodium chloride 3%. 150 milliLiter(s) IV Continuous <Continuous>  sodium chloride 3%. 150 milliLiter(s) IV Continuous <Continuous>    PRN MEDICATIONS  dextrose Oral Gel 15 Gram(s) Oral once PRN  melatonin 5 milliGRAM(s) Oral at bedtime PRN  morphine  - Injectable 4 milliGRAM(s) IV Push every 4 hours PRN  oxyCODONE    IR 5 milliGRAM(s) Oral every 4 hours PRN                                            ------------------------------------------------------------    LABS:                        9.5    4.65  )-----------( 152      ( 28 Jan 2023 04:39 )             29.9     01-28    141  |  99  |  42<H>  ----------------------------<  132<H>  4.6   |  28  |  1.8<H>    Ca    9.1      28 Jan 2023 21:33  Mg     2.0     01-28    TPro  6.5  /  Alb  3.4<L>  /  TBili  1.0  /  DBili  x   /  AST  16  /  ALT  <5  /  AlkPhos  67  01-28    CAPILLARY BLOOD GLUCOSE  POCT Blood Glucose.: 116 mg/dL (28 Jan 2023 21:28)  POCT Blood Glucose.: 133 mg/dL (28 Jan 2023 16:31)  POCT Blood Glucose.: 118 mg/dL (28 Jan 2023 11:50)  POCT Blood Glucose.: 91 mg/dL (28 Jan 2023 07:52)                                            -------------------------------------------------------------  RADIOLOGY:    < from: CT Chest No Cont (01.24.23 @ 11:26) >  IMPRESSION:  Since  12/3/2022:  Extensive soft tissue swelling left chest wall, involving adjacent breast is noted, similar to previous study. No discrete fluid collections or air bubbles.  Increasing amounts of anasarca throughout the thorax are also noted.  Stable pericardial effusion widest diameter 2.3 cm.  Bilateral groundglass opacities with septal thickening is noted suggesting interstitial edema      < from: Xray Chest 1 View- PORTABLE-Urgent (Xray Chest 1 View- PORTABLE-Urgent .) (01.21.23 @ 14:42) >  Impression: Stable pulmonary congestion. No pneumothorax      < from: [facility] Duplex Lower Extrem Arterial, Bilat (01.19.23 @ 16:57) >  IMPRESSION: No flow is visualized in distal right SFA and proximal and mid left SFA. Reconstitution in the popliteal artery bilaterally with diminished flow in tibial arteries.      < from: [facility] Duplex Lower Ext Vein Scan, Bilat (01.18.23 @ 22:06) >  Impression:  No evidence of deep venous thrombosis or superficial thrombophlebitis in the bilateral lower extremities.  Difficult to compress bilateral common femoral veins due to patient's lack of tolerance.                                            --------------------------------------------------------------

## 2023-01-29 NOTE — PROGRESS NOTE ADULT - ATTENDING COMMENTS
57 year old female with PMH of COPD on 4 L home O2, CORNELIUS on CPAP, HFrEF (19% on TTE 02/2022, s/p ICD, pulmonary HTN, CAD s/p PCI to RCA)1, HTN, HLD, CVA with residual LLE weakness, DM, active smoker on home O2 4L NC, presented to the ED for foot pain.  Patient does not know any of her meds or doses    SOB associated with  severe lower extremity edema due to acute on chronic HFrEF (19%)  Fluid Overload  medication noncompliance   - clinically improving   - Appreciate HF Consult -> Hypertonic Saline and Bumex Drip   - f/u K/Mg/Cr twice a day  * pt was on torsemide 20mg po daily at home, holding home spironolactone   - Cont entresto   - Cont metoprolol   - EP consult appreciated: plan for ICD per Dr Soto 2/13, continue with telemetry monitoring for now. Patient has life vest at home   - f/u HF  - magnesium 400 q8H and IV repletion as needed     Severe bilateral PAD  CAD s/p PCI / HTN   History of CVA  - Arterial duplex:  a) No flow is visualized in distal right SFA and proximal and mid left SFA.  b) Reconstitution in the popliteal artery bilaterally with diminished flow in tibial arteries.  -vascular cardiology on board, recommend cross sectional imaging once renal fxn improves  - c/w statin and plavix     AICD Site Infection PRIOR h/x   - now removed, no erythema or drainage around site   - CT chest reviewed not suggestive of collection in lt chest/breast area  - Blood cx :Staph haemolyticus in one bottle Likely contamination.    -Repeat blood cxs NGTD  - planned for  ICD placement with EP 2/13    hx of chronic hypoxic resp failure on 4 liter oxygen  due to COPD - stable   CORNELIUS  - Cont inhalers, CPAP     JEMIMA 2/2 HFrEF Exacerbation/diuresis   - baseline 1.3-1.4  - c/w diuresis per HF recs   - f/u Cr daily if worsens get Nephro consult f/u all electrolytes  - I/O's monitoring      DM-II   Suspected diabetic neuropathy  - hold home farxiga and metformin   - fingersticks and insulin   - started on gabapentin      Chronic Normocytic Anemia - stable, Venofer for 5 days per HF     Shoulder pain - likely from immobility - not responsive to oxycodone  - Patient reports itching from morphine  - trial oxycodone 10   . Ensure bowel regimen and PT    Overall prognosis poor given chronic medical conditions    Pending: diuresis, , ICD with ep and vascular cardiology f/u, pain control, diuresis, labs  Discussed with patient .
57 year old female with PMH of COPD on 4 L home O2, CORNELIUS on CPAP, HFrEF (19% on TTE 02/2022, s/p ICD, pulmonary HTN, CAD s/p PCI to RCA)1, HTN, HLD, CVA with residual LLE weakness, DM, active smoker on home O2 4L NC, presented to the ED for foot pain.  Patient does not know any of her meds or doses;  Comes from [facility] associated with  severe lower extremity edema due to acute on chronic HFrEF (19%)  FLuid Overload  medication noncompliance   - remains fluid overloaded clinically but improving   - Appreciate HF Consult -> Hypertonic Saline and Bumex Drip   - f/u K/Mg/Cr daily   * pt was on torsemide 20mg po daily at home, holding home spironolactone   - Cont entresto   - Cont metoprolol   - EP consult appreciated: plan for ICD per Dr Soto, continue with telemetry monitoring for now    Severe bilateral PAD  CAD s/p PCI / HTN   History of CVA  - Arterial duplex:  a) No flow is visualized in distal right SFA and proximal and mid left SFA.  b) Reconstitution in the popliteal artery bilaterally with diminished flow in tibial arteries.  -vascular cardiology on board, recommend cross sectional imaging once renal fxn improves  - c/w statin and plavix     AICD Site Infection PRIOR h/x   - now removed, site looks clean   - CT chest reviewed not suggestive of collection in lt chest/breast area  - Blood cx :Staph haemolyticus in one bottle Likely contamination.    - f/u Repeat blood cxs as recommended by ID   - will need ICD placement with EP    hx of chronic hypoxic resp failure on 4 liter oxygen  due to COPD - stable   CORNELIUS  - Cont inhalers, CPAP     JEMIMA 2/2 HFrEF Exacerbation/diuresis   - baseline 1.3-1.4  - c/w diuresis per HF recs   - f/u Cr daily if worsens get Nephro consult f/u all electrolytes daily   - I/O's monitoring      DM-II   Suspected diabetic neuropathy  - hold home farxiga and metformin   - fingersticks and insulin   - started on gabapentin      Chronic Normocytic Anemia   - Hb 10.3; can f/u Fe studies     Shoulder pain - likely from immobility - not responsive to oxycodone  - trial of morphine. Ensure bowel regimen and PT    Overall prognosis poor given chronic medical conditions    Pending: diuresis, repeat blood cx, ICD and vascular cardiology f/u, pain control   Discussed with patient

## 2023-01-30 LAB
ALBUMIN SERPL ELPH-MCNC: 3.5 G/DL — SIGNIFICANT CHANGE UP (ref 3.5–5.2)
ALP SERPL-CCNC: 73 U/L — SIGNIFICANT CHANGE UP (ref 30–115)
ALT FLD-CCNC: <5 U/L — SIGNIFICANT CHANGE UP (ref 0–41)
ANION GAP SERPL CALC-SCNC: 15 MMOL/L — HIGH (ref 7–14)
ANION GAP SERPL CALC-SCNC: 6 MMOL/L — LOW (ref 7–14)
AST SERPL-CCNC: 19 U/L — SIGNIFICANT CHANGE UP (ref 0–41)
BASOPHILS # BLD AUTO: 0.06 K/UL — SIGNIFICANT CHANGE UP (ref 0–0.2)
BASOPHILS NFR BLD AUTO: 1.3 % — HIGH (ref 0–1)
BILIRUB SERPL-MCNC: 1.1 MG/DL — SIGNIFICANT CHANGE UP (ref 0.2–1.2)
BUN SERPL-MCNC: 46 MG/DL — HIGH (ref 10–20)
BUN SERPL-MCNC: 50 MG/DL — HIGH (ref 10–20)
CALCIUM SERPL-MCNC: 9.2 MG/DL — SIGNIFICANT CHANGE UP (ref 8.4–10.4)
CALCIUM SERPL-MCNC: 9.2 MG/DL — SIGNIFICANT CHANGE UP (ref 8.4–10.5)
CHLORIDE SERPL-SCNC: 93 MMOL/L — LOW (ref 98–110)
CHLORIDE SERPL-SCNC: 97 MMOL/L — LOW (ref 98–110)
CO2 SERPL-SCNC: 28 MMOL/L — SIGNIFICANT CHANGE UP (ref 17–32)
CO2 SERPL-SCNC: 37 MMOL/L — HIGH (ref 17–32)
CREAT SERPL-MCNC: 1.8 MG/DL — HIGH (ref 0.7–1.5)
CREAT SERPL-MCNC: 1.8 MG/DL — HIGH (ref 0.7–1.5)
CULTURE RESULTS: SIGNIFICANT CHANGE UP
EGFR: 33 ML/MIN/1.73M2 — LOW
EGFR: 33 ML/MIN/1.73M2 — LOW
EOSINOPHIL # BLD AUTO: 0.14 K/UL — SIGNIFICANT CHANGE UP (ref 0–0.7)
EOSINOPHIL NFR BLD AUTO: 3 % — SIGNIFICANT CHANGE UP (ref 0–8)
GLUCOSE BLDC GLUCOMTR-MCNC: 123 MG/DL — HIGH (ref 70–99)
GLUCOSE BLDC GLUCOMTR-MCNC: 125 MG/DL — HIGH (ref 70–99)
GLUCOSE BLDC GLUCOMTR-MCNC: 151 MG/DL — HIGH (ref 70–99)
GLUCOSE BLDC GLUCOMTR-MCNC: 151 MG/DL — HIGH (ref 70–99)
GLUCOSE SERPL-MCNC: 106 MG/DL — HIGH (ref 70–99)
GLUCOSE SERPL-MCNC: 150 MG/DL — HIGH (ref 70–99)
HCT VFR BLD CALC: 31.4 % — LOW (ref 37–47)
HGB BLD-MCNC: 9.9 G/DL — LOW (ref 12–16)
IMM GRANULOCYTES NFR BLD AUTO: 0.4 % — HIGH (ref 0.1–0.3)
LYMPHOCYTES # BLD AUTO: 0.83 K/UL — LOW (ref 1.2–3.4)
LYMPHOCYTES # BLD AUTO: 17.6 % — LOW (ref 20.5–51.1)
MAGNESIUM SERPL-MCNC: 2 MG/DL — SIGNIFICANT CHANGE UP (ref 1.8–2.4)
MAGNESIUM SERPL-MCNC: 2 MG/DL — SIGNIFICANT CHANGE UP (ref 1.8–2.4)
MCHC RBC-ENTMCNC: 28 PG — SIGNIFICANT CHANGE UP (ref 27–31)
MCHC RBC-ENTMCNC: 31.5 G/DL — LOW (ref 32–37)
MCV RBC AUTO: 88.7 FL — SIGNIFICANT CHANGE UP (ref 81–99)
MONOCYTES # BLD AUTO: 0.59 K/UL — SIGNIFICANT CHANGE UP (ref 0.1–0.6)
MONOCYTES NFR BLD AUTO: 12.5 % — HIGH (ref 1.7–9.3)
NEUTROPHILS # BLD AUTO: 3.07 K/UL — SIGNIFICANT CHANGE UP (ref 1.4–6.5)
NEUTROPHILS NFR BLD AUTO: 65.2 % — SIGNIFICANT CHANGE UP (ref 42.2–75.2)
NRBC # BLD: 0 /100 WBCS — SIGNIFICANT CHANGE UP (ref 0–0)
PLATELET # BLD AUTO: 173 K/UL — SIGNIFICANT CHANGE UP (ref 130–400)
POTASSIUM SERPL-MCNC: 4.3 MMOL/L — SIGNIFICANT CHANGE UP (ref 3.5–5)
POTASSIUM SERPL-MCNC: 4.4 MMOL/L — SIGNIFICANT CHANGE UP (ref 3.5–5)
POTASSIUM SERPL-SCNC: 4.3 MMOL/L — SIGNIFICANT CHANGE UP (ref 3.5–5)
POTASSIUM SERPL-SCNC: 4.4 MMOL/L — SIGNIFICANT CHANGE UP (ref 3.5–5)
PROT SERPL-MCNC: 6.6 G/DL — SIGNIFICANT CHANGE UP (ref 6–8)
RBC # BLD: 3.54 M/UL — LOW (ref 4.2–5.4)
RBC # FLD: 18.2 % — HIGH (ref 11.5–14.5)
SODIUM SERPL-SCNC: 136 MMOL/L — SIGNIFICANT CHANGE UP (ref 135–146)
SODIUM SERPL-SCNC: 140 MMOL/L — SIGNIFICANT CHANGE UP (ref 135–146)
SPECIMEN SOURCE: SIGNIFICANT CHANGE UP
WBC # BLD: 4.71 K/UL — LOW (ref 4.8–10.8)
WBC # FLD AUTO: 4.71 K/UL — LOW (ref 4.8–10.8)

## 2023-01-30 PROCEDURE — 99233 SBSQ HOSP IP/OBS HIGH 50: CPT

## 2023-01-30 RX ORDER — SODIUM CHLORIDE 5 G/100ML
150 INJECTION, SOLUTION INTRAVENOUS
Refills: 0 | Status: DISCONTINUED | OUTPATIENT
Start: 2023-01-31 | End: 2023-01-31

## 2023-01-30 RX ORDER — SODIUM CHLORIDE 5 G/100ML
150 INJECTION, SOLUTION INTRAVENOUS
Refills: 0 | Status: COMPLETED | OUTPATIENT
Start: 2023-02-01 | End: 2023-02-01

## 2023-01-30 RX ORDER — SODIUM CHLORIDE 5 G/100ML
150 INJECTION, SOLUTION INTRAVENOUS
Refills: 0 | Status: DISCONTINUED | OUTPATIENT
Start: 2023-02-01 | End: 2023-02-03

## 2023-01-30 RX ORDER — SODIUM CHLORIDE 5 G/100ML
150 INJECTION, SOLUTION INTRAVENOUS
Refills: 0 | Status: DISCONTINUED | OUTPATIENT
Start: 2023-02-03 | End: 2023-02-03

## 2023-01-30 RX ORDER — SODIUM CHLORIDE 5 G/100ML
150 INJECTION, SOLUTION INTRAVENOUS
Refills: 0 | Status: DISCONTINUED | OUTPATIENT
Start: 2023-02-02 | End: 2023-02-03

## 2023-01-30 RX ORDER — SACUBITRIL AND VALSARTAN 24; 26 MG/1; MG/1
1 TABLET, FILM COATED ORAL
Refills: 0 | Status: DISCONTINUED | OUTPATIENT
Start: 2023-01-31 | End: 2023-02-04

## 2023-01-30 RX ORDER — TRAMADOL HYDROCHLORIDE 50 MG/1
25 TABLET ORAL ONCE
Refills: 0 | Status: DISCONTINUED | OUTPATIENT
Start: 2023-01-30 | End: 2023-01-30

## 2023-01-30 RX ORDER — METOPROLOL TARTRATE 50 MG
50 TABLET ORAL DAILY
Refills: 0 | Status: DISCONTINUED | OUTPATIENT
Start: 2023-01-31 | End: 2023-02-04

## 2023-01-30 RX ADMIN — GABAPENTIN 100 MILLIGRAM(S): 400 CAPSULE ORAL at 19:20

## 2023-01-30 RX ADMIN — Medication 1: at 11:35

## 2023-01-30 RX ADMIN — BUMETANIDE 5 MG/HR: 0.25 INJECTION INTRAMUSCULAR; INTRAVENOUS at 06:31

## 2023-01-30 RX ADMIN — MAGNESIUM OXIDE 400 MG ORAL TABLET 400 MILLIGRAM(S): 241.3 TABLET ORAL at 11:36

## 2023-01-30 RX ADMIN — PANTOPRAZOLE SODIUM 40 MILLIGRAM(S): 20 TABLET, DELAYED RELEASE ORAL at 05:35

## 2023-01-30 RX ADMIN — TRAMADOL HYDROCHLORIDE 25 MILLIGRAM(S): 50 TABLET ORAL at 06:31

## 2023-01-30 RX ADMIN — MAGNESIUM OXIDE 400 MG ORAL TABLET 400 MILLIGRAM(S): 241.3 TABLET ORAL at 19:21

## 2023-01-30 RX ADMIN — MAGNESIUM OXIDE 400 MG ORAL TABLET 400 MILLIGRAM(S): 241.3 TABLET ORAL at 08:41

## 2023-01-30 RX ADMIN — ATORVASTATIN CALCIUM 80 MILLIGRAM(S): 80 TABLET, FILM COATED ORAL at 21:07

## 2023-01-30 RX ADMIN — Medication 145 MILLIGRAM(S): at 11:36

## 2023-01-30 RX ADMIN — OXYCODONE HYDROCHLORIDE 10 MILLIGRAM(S): 5 TABLET ORAL at 16:49

## 2023-01-30 RX ADMIN — TRAMADOL HYDROCHLORIDE 25 MILLIGRAM(S): 50 TABLET ORAL at 03:37

## 2023-01-30 RX ADMIN — OXYCODONE HYDROCHLORIDE 10 MILLIGRAM(S): 5 TABLET ORAL at 10:30

## 2023-01-30 RX ADMIN — IRON SUCROSE 110 MILLIGRAM(S): 20 INJECTION, SOLUTION INTRAVENOUS at 14:07

## 2023-01-30 RX ADMIN — OXYCODONE HYDROCHLORIDE 10 MILLIGRAM(S): 5 TABLET ORAL at 02:25

## 2023-01-30 RX ADMIN — OXYCODONE HYDROCHLORIDE 10 MILLIGRAM(S): 5 TABLET ORAL at 16:19

## 2023-01-30 RX ADMIN — OXYCODONE HYDROCHLORIDE 10 MILLIGRAM(S): 5 TABLET ORAL at 01:36

## 2023-01-30 RX ADMIN — Medication 25 MICROGRAM(S): at 05:33

## 2023-01-30 RX ADMIN — OXYCODONE HYDROCHLORIDE 10 MILLIGRAM(S): 5 TABLET ORAL at 22:29

## 2023-01-30 RX ADMIN — CLOPIDOGREL BISULFATE 75 MILLIGRAM(S): 75 TABLET, FILM COATED ORAL at 11:36

## 2023-01-30 RX ADMIN — Medication 100 MILLIGRAM(S): at 05:33

## 2023-01-30 RX ADMIN — OXYCODONE HYDROCHLORIDE 10 MILLIGRAM(S): 5 TABLET ORAL at 09:32

## 2023-01-30 RX ADMIN — ENOXAPARIN SODIUM 40 MILLIGRAM(S): 100 INJECTION SUBCUTANEOUS at 05:33

## 2023-01-30 RX ADMIN — GABAPENTIN 100 MILLIGRAM(S): 400 CAPSULE ORAL at 05:33

## 2023-01-30 NOTE — PROGRESS NOTE ADULT - ASSESSMENT
Assessment: 57 year old female with PMH of COPD on 4 L home O2, CORNELIUS on CPAP, HFrEF, s/p ICD, pulmonary HTN, CAD s/p PCI to RCA)1, HTN, HLD, CVA with residual LLE weakness, DM, active smoker on home O2, presented to the ED for foot pain. Patient admitted for bilateral LE edema, CHF Exacerbation vs. Cellulitis. Heart failure team consulted to assist with fluid overload/acute decompensated heart failure management in the setting of JEMIMA.     Plan:  Patient remains grossly fluid overloaded on exam  POCUS exam- IVC 2.9cm dilated, non collapsing with respirations  Continue Bumex gtt at 1 mg/hr  Continue 3% Hypertonic Saline 150ml BID (If infused throughout a central line, run over one hour, if a peripheral line is to be used run over 3 hours)  Continue home BB  Resume home Entresto and spironolactone when hyperkalemia resolves/ BP stable  Get BMP twice daily with magnesium    Maintain potassium >4.0, Mg >2.2  Strict intake and output  Daily weight   Iron deficient- Give IV iron sucrose as 200 mg iv daily for 5 days  ICD placement in 3 weeks as per EP  Vascular cardiology following for PVD   Plan discussed with patient and primary team  Will continue to follow. Assessment: 57 year old female with PMH of COPD on 4 L home O2, CORNELIUS on CPAP, HFrEF, s/p ICD, pulmonary HTN, CAD s/p PCI to RCA)1, HTN, HLD, CVA with residual LLE weakness, DM, active smoker on home O2, presented to the ED for foot pain. Patient admitted for bilateral LE edema, CHF Exacerbation vs. Cellulitis. Heart failure team consulted to assist with fluid overload/acute decompensated heart failure management in the setting of JEMIMA.     Plan:  Patient remains fluid overloaded on exam  POCUS exam- IVC 2.9cm dilated, non collapsing with respirations  Continue Bumex gtt at 1 mg/hr  Continue 3% Hypertonic Saline 150ml BID (If infused throughout a central line, run over one hour, if a peripheral line is to be used run over 3 hours)  Decrease metoprolol succinate to 50mg daily (from 100mg daily)   Restart Entresto at low dose 24-26mg BID  Continue to hold spironolactone for now  Get BMP twice daily with magnesium    Maintain potassium >4.0, Mg >2.2  Strict intake and output  Daily weight   Iron deficient (ferritin 77)- continue IV iron sucrose as 200 mg iv daily for 5 days  ICD placement as per EP  Vascular cardiology following for PVD   Plan discussed with patient and primary team  Will continue to follow

## 2023-01-30 NOTE — PROGRESS NOTE ADULT - ASSESSMENT
57 year old female with PMH of COPD on 4 L home O2, CORNELIUS on CPAP, HFrEF (19% on TTE 02/2022, s/p ICD, pulmonary HTN, CAD s/p PCI to RCA)1, HTN, HLD, CVA with residual LLE weakness, DM, active smoker on home O2 4L NC, presented to the ED for foot pain.  Patient does not know any of her meds or doses    SOB associated with  severe lower extremity edema due to acute on chronic HFrEF (19%)  Fluid Overload  medication noncompliance     - clinically improving   - Appreciate HF Consult -> Hypertonic Saline and Bumex Drip   - f/u K/Mg/Cr twice a day  * pt was on torsemide 20mg po daily at home, holding home spironolactone   - Cont entresto at low dose 2426mg BID  - Metoprolol succinate decreased from 100-->50   - cont. hold spironolactone  -BMP BID with magnesium  -Stict I/O  - Daily weight    - EP consult appreciated: plan for ICD per Dr Soto 2/13, continue with telemetry monitoring for now. Patient has life vest at home   - magnesium 400 q8H and IV repletion as needed     Severe bilateral PAD  CAD s/p PCI / HTN   History of CVA  - Arterial duplex:  a) No flow is visualized in distal right SFA and proximal and mid left SFA.  b) Reconstitution in the popliteal artery bilaterally with diminished flow in tibial arteries.  -vascular cardiology on board, recommend cross sectional imaging once renal fxn improves  - c/w statin and plavix     AICD Site Infection PRIOR h/x   - now removed, no erythema or drainage around site   - CT chest reviewed not suggestive of collection in lt chest/breast area  - Blood cx :Staph haemolyticus in one bottle Likely contamination.    -Repeat blood cxs NGTD  - planned for  ICD placement with EP 2/13  hx of chronic hypoxic resp failure on 4 liter oxygen  due to COPD - stable   CORNELIUS  - Cont inhalers, CPAP   -Iron deficiency- cont. IV iron sucrose as 200mg ICV daiy for 5 days    JEMIMA 2/2 HFrEF Exacerbation/diuresis   - baseline 1.3-1.4  - c/w diuresis per HF recs   - f/u Cr daily if worsens get Nephro consult f/u all electrolytes  - I/O's monitoring      DM-II   Suspected diabetic neuropathy  - hold home farxiga and metformin   - fingersticks and insulin   - started on gabapentin      Chronic Normocytic Anemia - stable, Venofer for 5 days per HF     Shoulder pain - likely from immobility - not responsive to oxycodone  - Patient reports itching from morphine  - trial oxycodone 10   . Ensure bowel regimen and PT    Overall prognosis poor given chronic medical conditions    Pending: diuresis, , ICD with ep and vascular cardiology f/u, pain control, diuresis, labs  Discussed with patient .    57 year old female with PMH of COPD on 4 L home O2, CORNELIUS on CPAP, HFrEF (19% on TTE 02/2022, s/p ICD, pulmonary HTN, CAD s/p PCI to RCA)1, HTN, HLD, CVA with residual LLE weakness, DM, active smoker on home O2 4L NC, presented to the ED for foot pain.  Patient does not know any of her meds or doses    SOB associated with  severe lower extremity edema due to acute on chronic HFrEF (19%)  Fluid Overload  medication noncompliance     - clinically improving   - Appreciate HF Consult -> Hypertonic Saline and Bumex Drip   - f/u K/Mg/Cr twice a day  * pt was on torsemide 20mg po daily at home, holding home spironolactone   - Cont entresto at low dose 24-26mg BID  - Metoprolol succinate decreased from 100-->50   - cont. hold spironolactone  - Strict I/O  - Daily weight    - EP consult appreciated: plan for ICD per Dr Soto 2/13, continue with telemetry monitoring for now. Patient has life vest at home   - magnesium 400 q8H and IV repletion as needed     Severe bilateral PAD  CAD s/p PCI / HTN   History of CVA  - Arterial duplex:  a) No flow is visualized in distal right SFA and proximal and mid left SFA.  b) Reconstitution in the popliteal artery bilaterally with diminished flow in tibial arteries.  -vascular cardiology on board, recommend cross sectional imaging once renal fxn improves  - c/w statin and plavix     AICD Site Infection PRIOR h/x   - now removed, no erythema or drainage around site   - CT chest reviewed not suggestive of collection in lt chest/breast area  - Blood cx :Staph haemolyticus in one bottle Likely contamination.    -Repeat blood cxs NGTD  - planned for  ICD placement with EP 2/13      hx of chronic hypoxic resp failure on 4 liter oxygen  due to COPD - stable   CORNELIUS  - Cont inhalers, CPAP   -Iron deficiency- cont. IV iron sucrose as 200mg ICV daiy for 5 days    JEMIMA 2/2 HFrEF Exacerbation/diuresis   - baseline 1.3-1.4  - c/w diuresis per HF recs   - f/u Cr daily if worsens get Nephro consult f/u all electrolytes  - I/O's monitoring      DM-II   Suspected diabetic neuropathy  - hold home farxiga and metformin   - fingersticks and insulin   - started on gabapentin      Chronic Normocytic Anemia - stable, Venofer for 5 days per HF     Shoulder pain - likely from immobility - not responsive to oxycodone  - Patient reports itching from morphine  - trial oxycodone 10   . Ensure bowel regimen and PT    Overall prognosis poor given chronic medical conditions    Pending: diuresis, , ICD with ep and vascular cardiology f/u, pain control, diuresis, labs  Discussed with patient .

## 2023-01-30 NOTE — PROGRESS NOTE ADULT - ASSESSMENT
57 year old female with PMH of COPD on 4 L home O2, CORNELIUS on CPAP, HFrEF (19% on TTE 02/2022, s/p ICD, pulmonary HTN, CAD s/p PCI to RCA)1, HTN, HLD, CVA with residual LLE weakness, DM, active smoker on home O2 4L NC, presented to the ED for foot pain.  Patient does not know any of her meds or doses    SOB associated with  severe lower extremity edema due to acute on chronic HFrEF (19%)  Fluid Overload  medication noncompliance   - remains fluid overloaded clinically but improving   - c/w Hypertonic Saline and Bumex ggt as pt remains overloaded  - f/u K/Mg/Cr twice a day  * pt was on torsemide 20mg po daily at home, holding home spironolactone   - Cont entresto   - Cont metoprolol   - EP consult appreciated: plan for ICD per Dr Soto on 2/13, continue with telemetry monitoring for now  - f/u HF  - magnesium 400 q8H and IV repletion as needed     Severe bilateral PAD  CAD s/p PCI / HTN   History of CVA  - Arterial duplex:  a) No flow is visualized in distal right SFA and proximal and mid left SFA.  b) Reconstitution in the popliteal artery bilaterally with diminished flow in tibial arteries.  -vascular cardiology on board, recommend cross sectional imaging once renal fxn improves  - c/w statin and plavix     AICD Site Infection PRIOR h/x   - now removed, no erythema or drainage around site   - CT chest reviewed not suggestive of collection in lt chest/breast area  - Blood cx :Staph haemolyticus in one bottle Likely contamination.    -Repeat blood cxs NGTD  - will need ICD placement with EP as outpt on 2/13    hx of chronic hypoxic resp failure on 4 liter oxygen  due to COPD - stable   CORNELIUS  - Cont inhalers, CPAP     JEMIMA 2/2 HFrEF Exacerbation/diuresis   - baseline 1.3-1.4  - c/w diuresis per HF recs   - f/u Cr daily if worsens get Nephro consult f/u all electrolytes  - I/O's monitoring      DM-II   Suspected diabetic neuropathy  - hold home farxiga and metformin   - fingersticks and insulin   - started on gabapentin      Chronic Normocytic Anemia - stable    Shoulder pain - likely from immobility - not responsive to oxycodone  - trial of morphine. Ensure bowel regimen and PT    Overall prognosis poor given chronic medical conditions    Pending: arterial occlusion work up, diuresis  Discussed with patient .

## 2023-01-30 NOTE — PROGRESS NOTE ADULT - SUBJECTIVE AND OBJECTIVE BOX
CHIEF COMPLAINT:    Patient is a 57y old  Female who presents with a chief complaint of shortness of breath     INTERVAL HPI/OVERNIGHT EVENTS:    Patient seen and examined at bedside. No acute overnight events occurred.    ROS: Denies SOB, chest pain. All other systems are negative.    Medications:  Standing  atorvastatin 80 milliGRAM(s) Oral at bedtime  budesonide 160 MICROgram(s)/formoterol 4.5 MICROgram(s) Inhaler 2 Puff(s) Inhalation two times a day  buMETAnide Infusion 1 mG/Hr IV Continuous <Continuous>  clopidogrel Tablet 75 milliGRAM(s) Oral daily  dextrose 5%. 1000 milliLiter(s) IV Continuous <Continuous>  dextrose 5%. 1000 milliLiter(s) IV Continuous <Continuous>  dextrose 50% Injectable 25 Gram(s) IV Push once  dextrose 50% Injectable 12.5 Gram(s) IV Push once  dextrose 50% Injectable 25 Gram(s) IV Push once  enoxaparin Injectable 40 milliGRAM(s) SubCutaneous every 24 hours  fenofibrate Tablet 145 milliGRAM(s) Oral daily  gabapentin 100 milliGRAM(s) Oral two times a day  glucagon  Injectable 1 milliGRAM(s) IntraMuscular once  insulin lispro (ADMELOG) corrective regimen sliding scale   SubCutaneous three times a day before meals  iron sucrose IVPB 200 milliGRAM(s) IV Intermittent every 24 hours  levothyroxine 25 MICROGram(s) Oral daily  magnesium oxide 400 milliGRAM(s) Oral three times a day with meals  metoprolol succinate  milliGRAM(s) Oral daily  pantoprazole    Tablet 40 milliGRAM(s) Oral before breakfast  polyethylene glycol 3350 17 Gram(s) Oral two times a day  senna 2 Tablet(s) Oral at bedtime  sodium chloride 3%. 150 milliLiter(s) IV Continuous <Continuous>  sodium chloride 3%. 150 milliLiter(s) IV Continuous <Continuous>  sodium chloride 3%. 150 milliLiter(s) IV Continuous <Continuous>  sodium chloride 3%. 150 milliLiter(s) IV Continuous <Continuous>  sodium chloride 3%. 150 milliLiter(s) IV Continuous <Continuous>    PRN Meds  dextrose Oral Gel 15 Gram(s) Oral once PRN  melatonin 5 milliGRAM(s) Oral at bedtime PRN  morphine  - Injectable 4 milliGRAM(s) IV Push every 4 hours PRN  oxyCODONE    IR 10 milliGRAM(s) Oral every 6 hours PRN        Vital Signs:    T(F): 97.1 (23 @ 12:40), Max: 98.2 (23 @ 04:55)  HR: 74 (23 @ 16:11) (65 - 87)  BP: 104/60 (23 @ 16:11) (94/52 - 113/67)  RR: 18 (23 @ 16:11) (17 - 18)  SpO2: 98% (23 @ 16:11) (96% - 98%)  I&O's Summary    2023 07:  -  2023 07:00  --------------------------------------------------------  IN: 1620 mL / OUT: 3700 mL / NET: -2080 mL    2023 07:  -  2023 16:51  --------------------------------------------------------  IN: 734 mL / OUT: 502 mL / NET: 232 mL      Daily     Daily Weight in k (2023 04:55)  CAPILLARY BLOOD GLUCOSE      POCT Blood Glucose.: 151 mg/dL (2023 11:28)  POCT Blood Glucose.: 123 mg/dL (2023 07:27)  POCT Blood Glucose.: 124 mg/dL (2023 21:37)  POCT Blood Glucose.: 185 mg/dL (2023 16:58)      PHYSICAL EXAM:  GENERAL:  NAD  SKIN: several scars throughout  HEENT: Atraumatic. Normocephalic. Anicteric  NECK:  No JVD.   PULMONARY: Clear to ausculation bilaterally. No wheezing. No rales  CVS: Normal S1, S2. Regular rate and rhythm. No murmurs.  ABDOMEN/GI: Soft, Nontender, Nondistended; Bowel sounds are present  EXTREMITIES:  b/l pedal edema B/L LE.  NEUROLOGIC:  No motor deficit.  PSYCH: Alert & oriented x 3, normal affect      LABS:                        9.9    4.71  )-----------( 173      ( 2023 05:50 )             31.4         140  |  97<L>  |  46<H>  ----------------------------<  150<H>  4.3   |  28  |  1.8<H>    Ca    9.2      2023 05:50  Mg     2.0         TPro  6.6  /  Alb  3.5  /  TBili  1.1  /  DBili  x   /  AST  19  /  ALT  <5  /  AlkPhos  73        RADIOLOGY & ADDITIONAL TESTS:  Imaging or report Personally Reviewed:  [ ] YES  [ ] NO -->no new images    Telemetry reviewed independently - NSR, no acute events  EKG reviewed independently -->no new EKGs    Consultant(s) Notes Reviewed:  [ ] YES  [ ] NO  Care Discussed with Consultants/Other Providers [ ] YES  [ ] NO    Case discussed with resident  Care discussed with pt

## 2023-01-30 NOTE — PROGRESS NOTE ADULT - SUBJECTIVE AND OBJECTIVE BOX
OVERNIGHT EVENTS: TANIA O/N denies any SOB and states that LE edema has gotten better.    SUBJECTIVE / INTERVAL HPI: Patient seen and examined at bedside.     VITAL SIGNS:  Vital Signs Last 24 Hrs  T(C): 36.2 (30 Jan 2023 12:40), Max: 36.8 (30 Jan 2023 04:55)  T(F): 97.1 (30 Jan 2023 12:40), Max: 98.2 (30 Jan 2023 04:55)  HR: 74 (30 Jan 2023 16:11) (65 - 87)  BP: 104/60 (30 Jan 2023 16:11) (94/52 - 113/67)  BP(mean): --  RR: 18 (30 Jan 2023 16:11) (17 - 18)  SpO2: 98% (30 Jan 2023 16:11) (96% - 98%)        PHYSICAL EXAM:    General: NAD  HEENT: NC/AT; PERRL, anicteric sclera;   Neck: supple  Cardiovascular: +S1/S2, RRR, no murmurs, rubs, gallops  Respiratory: CTA B/L; no W/R/R  Gastrointestinal: soft, NT/ND; +BSx4  Extremities: WWP; +2-3 Edema in LE up to thighs  Neurological: AAOx3; no focal deficits    MEDICATIONS:  MEDICATIONS  (STANDING):  atorvastatin 80 milliGRAM(s) Oral at bedtime  budesonide 160 MICROgram(s)/formoterol 4.5 MICROgram(s) Inhaler 2 Puff(s) Inhalation two times a day  buMETAnide Infusion 1 mG/Hr (5 mL/Hr) IV Continuous <Continuous>  clopidogrel Tablet 75 milliGRAM(s) Oral daily  dextrose 5%. 1000 milliLiter(s) (50 mL/Hr) IV Continuous <Continuous>  dextrose 5%. 1000 milliLiter(s) (100 mL/Hr) IV Continuous <Continuous>  dextrose 50% Injectable 25 Gram(s) IV Push once  dextrose 50% Injectable 12.5 Gram(s) IV Push once  dextrose 50% Injectable 25 Gram(s) IV Push once  enoxaparin Injectable 40 milliGRAM(s) SubCutaneous every 24 hours  fenofibrate Tablet 145 milliGRAM(s) Oral daily  gabapentin 100 milliGRAM(s) Oral two times a day  glucagon  Injectable 1 milliGRAM(s) IntraMuscular once  insulin lispro (ADMELOG) corrective regimen sliding scale   SubCutaneous three times a day before meals  iron sucrose IVPB 200 milliGRAM(s) IV Intermittent every 24 hours  levothyroxine 25 MICROGram(s) Oral daily  magnesium oxide 400 milliGRAM(s) Oral three times a day with meals  metoprolol succinate  milliGRAM(s) Oral daily  pantoprazole    Tablet 40 milliGRAM(s) Oral before breakfast  polyethylene glycol 3350 17 Gram(s) Oral two times a day  senna 2 Tablet(s) Oral at bedtime  sodium chloride 3%. 150 milliLiter(s) (50 mL/Hr) IV Continuous <Continuous>  sodium chloride 3%. 150 milliLiter(s) (50 mL/Hr) IV Continuous <Continuous>  sodium chloride 3%. 150 milliLiter(s) (50 mL/Hr) IV Continuous <Continuous>  sodium chloride 3%. 150 milliLiter(s) (50 mL/Hr) IV Continuous <Continuous>  sodium chloride 3%. 150 milliLiter(s) (50 mL/Hr) IV Continuous <Continuous>    MEDICATIONS  (PRN):  dextrose Oral Gel 15 Gram(s) Oral once PRN Blood Glucose LESS THAN 70 milliGRAM(s)/deciliter  melatonin 5 milliGRAM(s) Oral at bedtime PRN Sleep  morphine  - Injectable 4 milliGRAM(s) IV Push every 4 hours PRN Severe Pain (7 - 10)  oxyCODONE    IR 10 milliGRAM(s) Oral every 6 hours PRN Moderate Pain (4 - 6)      ALLERGIES:  Allergies    No Known Allergies    Intolerances        LABS:                        9.9    4.71  )-----------( 173      ( 30 Jan 2023 05:50 )             31.4     01-30    140  |  97<L>  |  46<H>  ----------------------------<  150<H>  4.3   |  28  |  1.8<H>    Ca    9.2      30 Jan 2023 05:50  Mg     2.0     01-30    TPro  6.6  /  Alb  3.5  /  TBili  1.1  /  DBili  x   /  AST  19  /  ALT  <5  /  AlkPhos  73  01-30        CAPILLARY BLOOD GLUCOSE      POCT Blood Glucose.: 151 mg/dL (30 Jan 2023 11:28)      RADIOLOGY & ADDITIONAL TESTS: Reviewed.    PLAN:

## 2023-01-30 NOTE — PROGRESS NOTE ADULT - SUBJECTIVE AND OBJECTIVE BOX
Date of Admission: 23    Interval History: Patient resting in bed, in NAD. Reports feeling better, denies SOB. Still c/o B/L foot pain but states it has improved as well. Remains on Bumex gtt and 3% hypertonic saline. Creat improved, 24 UOP 3.1 L with net negative 2.2 L. Remains overloaded.     Chief Complaint: Patient is a 57y old  Female who presents with a chief complaint of sob (2023 14:03)    HISTORY OF PRESENT ILLNESS: 57 year old female with PMH of COPD on 4 L home O2, CORNELIUS on CPAP, HFrEF (19% on TTE 2022, s/p ICD, pulmonary HTN, CAD s/p PCI to RCA)1, HTN, HLD, CVA with residual LLE weakness, DM, active smoker on home O2, presented to the ED for foot pain.   She reports that pain started in bilateral legs two days ago. She also noticed increased swelling in both legs. Pain was so intense, she could no longer ambulate or touch her feet. Symptoms are worse on her L leg. She also reports that her chronic shortness of breath has been worse than usual. She also endorses that she generally feels more swollen than usual.   Patient admitted for bilateral LE edema, CHF Exacerbation vs. Cellulitis (less likely)   Patient does not know any of her meds or doses; verify with [employer] in the AM. Meds started using surescripts. (2023 03:19)      Patient well known to heart failure team from multiple previous admissions. Patient reports progressively worsening LE edema and tenderness x1 week. Reports her breathing is baseline (mild SOB 2/2 COPD). Endorses home medication compliance but is unaware of which medications she takes. She believes she is maintaining a low Na diet at home, however, she stated she orders Chinese food often. Denies chest pain, palpitations, n/v. +early satiety +decrease in appetite +cough.      PAST MEDICAL & SURGICAL HISTORY:  Hypertension  Hyperlipidemia  Anxiety and depression  COPD, severe  CHF (congestive heart failure)  Cerebrovascular accident (CVA)Multiple  Type 2 diabetes mellitus  CKD (chronic kidney disease), stage II  No significant past surgical history        FAMILY HISTORY:  No pertinent family history of premature cardiovascular disease in first degree relatives.  Mother:  of cancer at 65  Father:  of an overdose at 50    SOCIAL HISTORY: Former smoker- states she quit 1 week ago. Denies alcohol or drug use.       Allergies  No Known Allergies    Intolerances      PHYSICAL EXAM:  Vital Signs Last 24 Hrs  T(C): 36.4 (2023 12:34), Max: 36.4 (2023 20:25)  T(F): 97.6 (2023 12:34), Max: 97.6 (2023 20:25)  HR: 91 (2023 12:34) (74 - 91)  BP: 97/51 (2023 12:34) (97/51 - 108/61)  BP(mean): --  RR: 18 (2023 12:34) (18 - 18)  SpO2: --      General Appearance: normal for age and gender. 	  Neck: unable to assess due to body habitus  Eyes: Extra Ocular muscles intact.   Cardiovascular: regular rate and rhythm S1 S2,+2-3 B/L edema up to thighs  Respiratory: +b/l crackles   Psychiatry: Alert and oriented x 3, Mood & affect appropriate  Gastrointestinal:  Soft, Non-tender  Skin/Integumentary : No rashes, No ecchymoses, No cyanosis	  Neurologic: Non-focal  Musculoskeletal/ extremities: Normal range of motion, No clubbing, cyanosis   Vascular: Peripheral pulses palpable 2+ bilaterally    CARDIAC MARKERS:  Serum Pro-Brain Natriuretic Peptide: 4892 pg/mL (22 @ 10:50)      TELEMETRY EVENTS: 	    EC23    Ventricular Rate 112 BPM  Atrial Rate 112 BPM  P-R Interval 172 ms  QRS Duration 134 ms  Q-T Interval 356 ms  QTC Calculation(Bazett) 485 ms  P Axis 54 degrees  R Axis 202 degrees  T Axis 51 degrees    Diagnosis Line Sinus tachycardia  Non-specific intra-ventricular conduction block  Lateral infarct , age undetermined  Abnormal ECG    Confirmed by Asad Buitrago ([id number]) on 2023 8:04:59 AM        PREVIOUS DIAGNOSTIC TESTING:      TTE 22      Summary:   1. Moderately decreased global left ventricular systolic function with   estimated EF of 25-30% with increased wall thickness.   2. The left ventricular diastolic function could not be assessed in this   study.   3. Mildly enlarged right ventricle (RVEDD = 4.3cm) with moderately   reduced function.   4. Diastolic septal wall flattening consistent with RV volume overload.   5. Mild biatrial enlargement.   6. Mitral valve with bileaflet tethering and resultant mild   regurgitation.   7. Severe tricuspid regurgitation with hepatic vein systolic flow   reversal.   8. Sclerotic aortic valve with normal opening and mild regurgitation.   9. Severe pulmonary hypertension. PASP is at least 65mmHg; this may be   underestimated due to severity of tricuspid regurgitation.  10. Moderate sized pericardial effusion localized posterior tothe right   atrium without echocardiographic evidence of tamponade physiology.  11. Compared to prior study, pericardial effusion size is stable;   findings are overall similar (pHTN severity was likely underestimated on   prior study 2/2 severe TR).    	    Home Medications:  Albuterol (Eqv-ProAir HFA) 90 mcg/inh inhalation aerosol: 2 puff(s) inhaled every 6 hours, As Needed (2021 11:22)  aspirin 81 mg oral tablet: 1 tab(s) orally once a day (2021 11:22)  fenofibrate 145 mg oral tablet: 1 tab(s) orally once a day (2021 11:22)  glipiZIDE 10 mg oral tablet: 1 tab(s) orally 2 times a day (2021 11:22)  Lovaza 1000 mg oral capsule: 2 cap(s) orally 2 times a day (2021 14:52)  metFORMIN 1000 mg oral tablet: 1 tab(s) orally 2 times a day Do not take until  (2021 12:58)  Plavix 75 mg oral tablet: 1 tab(s) orally once a day (2021 11:22)  Vitamin D2 1.25 mg (50,000 intl units) oral capsule: 1 cap(s) orally once a week (2021 14:52)    MEDICATIONS  (STANDING):  aspirin  chewable 81 milliGRAM(s) Oral daily  atorvastatin 80 milliGRAM(s) Oral at bedtime  budesonide 160 MICROgram(s)/formoterol 4.5 MICROgram(s) Inhaler 2 Puff(s) Inhalation two times a day  chlorhexidine 4% Liquid 1 Application(s) Topical <User Schedule>  clopidogrel Tablet 75 milliGRAM(s) Oral daily  enoxaparin Injectable 40 milliGRAM(s) SubCutaneous daily  fenofibrate Tablet 145 milliGRAM(s) Oral daily  furosemide   Injectable 40 milliGRAM(s) IV Push two times a day  magnesium sulfate  IVPB 2 Gram(s) IV Intermittent once  metoprolol succinate ER 50 milliGRAM(s) Oral daily  omega-3-Acid Ethyl Esters 2 Gram(s) Oral two times a day  pantoprazole    Tablet 40 milliGRAM(s) Oral before breakfast  sacubitril 49 mG/valsartan 51 mG 1 Tablet(s) Oral two times a day    MEDICATIONS  (PRN):  ALBUTerol    90 MICROgram(s) HFA Inhaler 2 Puff(s) Inhalation every 6 hours PRN Shortness of Breath and/or Wheezing         Date of Admission: 23    Interval History: Patient resting in bed, in NAD. Reports improvement in breathing and B/L LE edema, but continues to c/o B/L leg tenderness and is seeking a higher dose of pain medication. Remains on Bumex gtt and 3% hypertonic saline. 24 UOP 3.7L. Remains overloaded.     Chief Complaint: Patient is a 57y old  Female who presents with a chief complaint of sob (2023 14:03)    HISTORY OF PRESENT ILLNESS: 57 year old female with PMH of COPD on 4 L home O2, CORNELIUS on CPAP, HFrEF (19% on TTE 2022, s/p ICD, pulmonary HTN, CAD s/p PCI to RCA)1, HTN, HLD, CVA with residual LLE weakness, DM, active smoker on home O2, presented to the ED for foot pain.   She reports that pain started in bilateral legs two days ago. She also noticed increased swelling in both legs. Pain was so intense, she could no longer ambulate or touch her feet. Symptoms are worse on her L leg. She also reports that her chronic shortness of breath has been worse than usual. She also endorses that she generally feels more swollen than usual.   Patient admitted for bilateral LE edema, CHF Exacerbation vs. Cellulitis (less likely)   Patient does not know any of her meds or doses; verify with [email or web address] in the AM. Meds started using surescripts. (2023 03:19)      Patient well known to heart failure team from multiple previous admissions. Patient reports progressively worsening LE edema and tenderness x1 week. Reports her breathing is baseline (mild SOB 2/2 COPD). Endorses home medication compliance but is unaware of which medications she takes. She believes she is maintaining a low Na diet at home, however, she stated she orders Chinese food often. Denies chest pain, palpitations, n/v. +early satiety +decrease in appetite +cough.      PAST MEDICAL & SURGICAL HISTORY:  Hypertension  Hyperlipidemia  Anxiety and depression  COPD, severe  CHF (congestive heart failure)  Cerebrovascular accident (CVA)Multiple  Type 2 diabetes mellitus  CKD (chronic kidney disease), stage II  No significant past surgical history        FAMILY HISTORY:  No pertinent family history of premature cardiovascular disease in first degree relatives.  Mother:  of cancer at 65  Father:  of an overdose at 50    SOCIAL HISTORY: Former smoker- states she quit 1 week ago. Denies alcohol or drug use.       Allergies  No Known Allergies    Intolerances      PHYSICAL EXAM:  T(C): 36.2 (2023 12:40), Max: 36.8 (2023 04:55)  T(F): 97.1 (2023 12:40), Max: 98.2 (2023 04:55)  HR: 74 (2023 16:11) (65 - 87)  BP: 104/60 (2023 16:11) (94/52 - 113/67)  RR: 18 (2023 16:11) (17 - 18)  SpO2: 98% (2023 16:11) (96% - 98%)      General Appearance: normal for age and gender. 	  Neck: JVP 05kgX1B  Eyes: Extra Ocular muscles intact.   Cardiovascular: regular rate and rhythm S1 S2,+2-3 B/L edema up to thighs  Respiratory: decreased B/L  Psychiatry: Alert and oriented x 3, Mood & affect appropriate  Gastrointestinal:  Soft, Non-tender  Skin/Integumentary : No rashes, No ecchymoses, No cyanosis	  Neurologic: Non-focal  Musculoskeletal/ extremities: Normal range of motion, No clubbing, cyanosis   Vascular: Peripheral pulses palpable 2+ bilaterally      CARDIAC MARKERS:  Serum Pro-Brain Natriuretic Peptide: 4892 pg/mL (22 @ 10:50)      TELEMETRY EVENTS: 	    EC23    Ventricular Rate 112 BPM  Atrial Rate 112 BPM  P-R Interval 172 ms  QRS Duration 134 ms  Q-T Interval 356 ms  QTC Calculation(Bazett) 485 ms  P Axis 54 degrees  R Axis 202 degrees  T Axis 51 degrees    Diagnosis Line Sinus tachycardia  Non-specific intra-ventricular conduction block  Lateral infarct , age undetermined  Abnormal ECG    Confirmed by Asad Buitrago ([id number]) on 2023 8:04:59 AM        PREVIOUS DIAGNOSTIC TESTING:      TTE 22      Summary:   1. Moderately decreased global left ventricular systolic function with   estimated EF of 25-30% with increased wall thickness.   2. The left ventricular diastolic function could not be assessed in this   study.   3. Mildly enlarged right ventricle (RVEDD = 4.3cm) with moderately   reduced function.   4. Diastolic septal wall flattening consistent with RV volume overload.   5. Mild biatrial enlargement.   6. Mitral valve with bileaflet tethering and resultant mild   regurgitation.   7. Severe tricuspid regurgitation with hepatic vein systolic flow   reversal.   8. Sclerotic aortic valve with normal opening and mild regurgitation.   9. Severe pulmonary hypertension. PASP is at least 65mmHg; this may be   underestimated due to severity of tricuspid regurgitation.  10. Moderate sized pericardial effusion localized posterior tothe right   atrium without echocardiographic evidence of tamponade physiology.  11. Compared to prior study, pericardial effusion size is stable;   findings are overall similar (pHTN severity was likely underestimated on   prior study 2/2 severe TR).      Home Medications:  Albuterol (Eqv-ProAir HFA) 90 mcg/inh inhalation aerosol: 2 puff(s) inhaled every 6 hours, As Needed (2021 11:22)  aspirin 81 mg oral tablet: 1 tab(s) orally once a day (2021 11:22)  fenofibrate 145 mg oral tablet: 1 tab(s) orally once a day (2021 11:22)  glipiZIDE 10 mg oral tablet: 1 tab(s) orally 2 times a day (2021 11:22)  Lovaza 1000 mg oral capsule: 2 cap(s) orally 2 times a day (2021 14:52)  metFORMIN 1000 mg oral tablet: 1 tab(s) orally 2 times a day Do not take until  (2021 12:58)  Plavix 75 mg oral tablet: 1 tab(s) orally once a day (2021 11:22)  Vitamin D2 1.25 mg (50,000 intl units) oral capsule: 1 cap(s) orally once a week (2021 14:52)    MEDICATIONS  (STANDING):  aspirin  chewable 81 milliGRAM(s) Oral daily  atorvastatin 80 milliGRAM(s) Oral at bedtime  budesonide 160 MICROgram(s)/formoterol 4.5 MICROgram(s) Inhaler 2 Puff(s) Inhalation two times a day  chlorhexidine 4% Liquid 1 Application(s) Topical <User Schedule>  clopidogrel Tablet 75 milliGRAM(s) Oral daily  enoxaparin Injectable 40 milliGRAM(s) SubCutaneous daily  fenofibrate Tablet 145 milliGRAM(s) Oral daily  furosemide   Injectable 40 milliGRAM(s) IV Push two times a day  magnesium sulfate  IVPB 2 Gram(s) IV Intermittent once  metoprolol succinate ER 50 milliGRAM(s) Oral daily  omega-3-Acid Ethyl Esters 2 Gram(s) Oral two times a day  pantoprazole    Tablet 40 milliGRAM(s) Oral before breakfast  sacubitril 49 mG/valsartan 51 mG 1 Tablet(s) Oral two times a day    MEDICATIONS  (PRN):  ALBUTerol    90 MICROgram(s) HFA Inhaler 2 Puff(s) Inhalation every 6 hours PRN Shortness of Breath and/or Wheezing

## 2023-01-31 LAB
ALBUMIN SERPL ELPH-MCNC: 3.6 G/DL — SIGNIFICANT CHANGE UP (ref 3.5–5.2)
ALP SERPL-CCNC: 69 U/L — SIGNIFICANT CHANGE UP (ref 30–115)
ALT FLD-CCNC: <5 U/L — SIGNIFICANT CHANGE UP (ref 0–41)
ANION GAP SERPL CALC-SCNC: 11 MMOL/L — SIGNIFICANT CHANGE UP (ref 7–14)
ANION GAP SERPL CALC-SCNC: 11 MMOL/L — SIGNIFICANT CHANGE UP (ref 7–14)
ANION GAP SERPL CALC-SCNC: 14 MMOL/L — SIGNIFICANT CHANGE UP (ref 7–14)
AST SERPL-CCNC: 18 U/L — SIGNIFICANT CHANGE UP (ref 0–41)
BASOPHILS # BLD AUTO: 0.08 K/UL — SIGNIFICANT CHANGE UP (ref 0–0.2)
BASOPHILS NFR BLD AUTO: 1.7 % — HIGH (ref 0–1)
BILIRUB SERPL-MCNC: 1.1 MG/DL — SIGNIFICANT CHANGE UP (ref 0.2–1.2)
BUN SERPL-MCNC: 50 MG/DL — HIGH (ref 10–20)
BUN SERPL-MCNC: 52 MG/DL — HIGH (ref 10–20)
BUN SERPL-MCNC: 53 MG/DL — HIGH (ref 10–20)
CALCIUM SERPL-MCNC: 9.1 MG/DL — SIGNIFICANT CHANGE UP (ref 8.4–10.5)
CALCIUM SERPL-MCNC: 9.1 MG/DL — SIGNIFICANT CHANGE UP (ref 8.4–10.5)
CALCIUM SERPL-MCNC: 9.6 MG/DL — SIGNIFICANT CHANGE UP (ref 8.4–10.5)
CHLORIDE SERPL-SCNC: 94 MMOL/L — LOW (ref 98–110)
CHLORIDE SERPL-SCNC: 95 MMOL/L — LOW (ref 98–110)
CHLORIDE SERPL-SCNC: 95 MMOL/L — LOW (ref 98–110)
CO2 SERPL-SCNC: 30 MMOL/L — SIGNIFICANT CHANGE UP (ref 17–32)
CO2 SERPL-SCNC: 32 MMOL/L — SIGNIFICANT CHANGE UP (ref 17–32)
CO2 SERPL-SCNC: 33 MMOL/L — HIGH (ref 17–32)
CREAT SERPL-MCNC: 1.8 MG/DL — HIGH (ref 0.7–1.5)
EGFR: 33 ML/MIN/1.73M2 — LOW
EOSINOPHIL # BLD AUTO: 0.18 K/UL — SIGNIFICANT CHANGE UP (ref 0–0.7)
EOSINOPHIL NFR BLD AUTO: 3.9 % — SIGNIFICANT CHANGE UP (ref 0–8)
GLUCOSE BLDC GLUCOMTR-MCNC: 116 MG/DL — HIGH (ref 70–99)
GLUCOSE BLDC GLUCOMTR-MCNC: 124 MG/DL — HIGH (ref 70–99)
GLUCOSE BLDC GLUCOMTR-MCNC: 134 MG/DL — HIGH (ref 70–99)
GLUCOSE BLDC GLUCOMTR-MCNC: 146 MG/DL — HIGH (ref 70–99)
GLUCOSE SERPL-MCNC: 102 MG/DL — HIGH (ref 70–99)
GLUCOSE SERPL-MCNC: 119 MG/DL — HIGH (ref 70–99)
GLUCOSE SERPL-MCNC: 137 MG/DL — HIGH (ref 70–99)
HCT VFR BLD CALC: 31.1 % — LOW (ref 37–47)
HGB BLD-MCNC: 9.8 G/DL — LOW (ref 12–16)
IMM GRANULOCYTES NFR BLD AUTO: 0.2 % — SIGNIFICANT CHANGE UP (ref 0.1–0.3)
LYMPHOCYTES # BLD AUTO: 0.85 K/UL — LOW (ref 1.2–3.4)
LYMPHOCYTES # BLD AUTO: 18.3 % — LOW (ref 20.5–51.1)
MAGNESIUM SERPL-MCNC: 2 MG/DL — SIGNIFICANT CHANGE UP (ref 1.8–2.4)
MAGNESIUM SERPL-MCNC: 2.1 MG/DL — SIGNIFICANT CHANGE UP (ref 1.8–2.4)
MCHC RBC-ENTMCNC: 27.8 PG — SIGNIFICANT CHANGE UP (ref 27–31)
MCHC RBC-ENTMCNC: 31.5 G/DL — LOW (ref 32–37)
MCV RBC AUTO: 88.4 FL — SIGNIFICANT CHANGE UP (ref 81–99)
MONOCYTES # BLD AUTO: 0.65 K/UL — HIGH (ref 0.1–0.6)
MONOCYTES NFR BLD AUTO: 14 % — HIGH (ref 1.7–9.3)
NEUTROPHILS # BLD AUTO: 2.88 K/UL — SIGNIFICANT CHANGE UP (ref 1.4–6.5)
NEUTROPHILS NFR BLD AUTO: 61.9 % — SIGNIFICANT CHANGE UP (ref 42.2–75.2)
NRBC # BLD: 0 /100 WBCS — SIGNIFICANT CHANGE UP (ref 0–0)
PLATELET # BLD AUTO: 165 K/UL — SIGNIFICANT CHANGE UP (ref 130–400)
POTASSIUM SERPL-MCNC: 4.5 MMOL/L — SIGNIFICANT CHANGE UP (ref 3.5–5)
POTASSIUM SERPL-MCNC: 4.7 MMOL/L — SIGNIFICANT CHANGE UP (ref 3.5–5)
POTASSIUM SERPL-MCNC: 4.7 MMOL/L — SIGNIFICANT CHANGE UP (ref 3.5–5)
POTASSIUM SERPL-SCNC: 4.5 MMOL/L — SIGNIFICANT CHANGE UP (ref 3.5–5)
POTASSIUM SERPL-SCNC: 4.7 MMOL/L — SIGNIFICANT CHANGE UP (ref 3.5–5)
POTASSIUM SERPL-SCNC: 4.7 MMOL/L — SIGNIFICANT CHANGE UP (ref 3.5–5)
PROT SERPL-MCNC: 6.7 G/DL — SIGNIFICANT CHANGE UP (ref 6–8)
RBC # BLD: 3.52 M/UL — LOW (ref 4.2–5.4)
RBC # FLD: 18.3 % — HIGH (ref 11.5–14.5)
SODIUM SERPL-SCNC: 137 MMOL/L — SIGNIFICANT CHANGE UP (ref 135–146)
SODIUM SERPL-SCNC: 139 MMOL/L — SIGNIFICANT CHANGE UP (ref 135–146)
SODIUM SERPL-SCNC: 139 MMOL/L — SIGNIFICANT CHANGE UP (ref 135–146)
WBC # BLD: 4.65 K/UL — LOW (ref 4.8–10.8)
WBC # FLD AUTO: 4.65 K/UL — LOW (ref 4.8–10.8)

## 2023-01-31 PROCEDURE — 99233 SBSQ HOSP IP/OBS HIGH 50: CPT

## 2023-01-31 RX ORDER — LIDOCAINE 4 G/100G
2 CREAM TOPICAL ONCE
Refills: 0 | Status: COMPLETED | OUTPATIENT
Start: 2023-01-31 | End: 2023-01-31

## 2023-01-31 RX ORDER — SODIUM CHLORIDE 5 G/100ML
150 INJECTION, SOLUTION INTRAVENOUS
Refills: 0 | Status: COMPLETED | OUTPATIENT
Start: 2023-01-31 | End: 2023-01-31

## 2023-01-31 RX ORDER — MAGNESIUM SULFATE 500 MG/ML
2 VIAL (ML) INJECTION ONCE
Refills: 0 | Status: DISCONTINUED | OUTPATIENT
Start: 2023-01-31 | End: 2023-01-31

## 2023-01-31 RX ADMIN — OXYCODONE HYDROCHLORIDE 10 MILLIGRAM(S): 5 TABLET ORAL at 02:16

## 2023-01-31 RX ADMIN — Medication 5 MILLIGRAM(S): at 01:03

## 2023-01-31 RX ADMIN — SACUBITRIL AND VALSARTAN 1 TABLET(S): 24; 26 TABLET, FILM COATED ORAL at 18:19

## 2023-01-31 RX ADMIN — LIDOCAINE 2 PATCH: 4 CREAM TOPICAL at 11:28

## 2023-01-31 RX ADMIN — BUMETANIDE 5 MG/HR: 0.25 INJECTION INTRAMUSCULAR; INTRAVENOUS at 12:06

## 2023-01-31 RX ADMIN — SODIUM CHLORIDE 50 MILLILITER(S): 5 INJECTION, SOLUTION INTRAVENOUS at 09:21

## 2023-01-31 RX ADMIN — ATORVASTATIN CALCIUM 80 MILLIGRAM(S): 80 TABLET, FILM COATED ORAL at 21:46

## 2023-01-31 RX ADMIN — BUDESONIDE AND FORMOTEROL FUMARATE DIHYDRATE 2 PUFF(S): 160; 4.5 AEROSOL RESPIRATORY (INHALATION) at 11:18

## 2023-01-31 RX ADMIN — CLOPIDOGREL BISULFATE 75 MILLIGRAM(S): 75 TABLET, FILM COATED ORAL at 11:17

## 2023-01-31 RX ADMIN — IRON SUCROSE 110 MILLIGRAM(S): 20 INJECTION, SOLUTION INTRAVENOUS at 11:18

## 2023-01-31 RX ADMIN — OXYCODONE HYDROCHLORIDE 10 MILLIGRAM(S): 5 TABLET ORAL at 14:35

## 2023-01-31 RX ADMIN — GABAPENTIN 100 MILLIGRAM(S): 400 CAPSULE ORAL at 05:23

## 2023-01-31 RX ADMIN — Medication 50 MILLIGRAM(S): at 05:23

## 2023-01-31 RX ADMIN — MAGNESIUM OXIDE 400 MG ORAL TABLET 400 MILLIGRAM(S): 241.3 TABLET ORAL at 18:19

## 2023-01-31 RX ADMIN — MAGNESIUM OXIDE 400 MG ORAL TABLET 400 MILLIGRAM(S): 241.3 TABLET ORAL at 12:06

## 2023-01-31 RX ADMIN — OXYCODONE HYDROCHLORIDE 10 MILLIGRAM(S): 5 TABLET ORAL at 06:38

## 2023-01-31 RX ADMIN — Medication 145 MILLIGRAM(S): at 11:17

## 2023-01-31 RX ADMIN — Medication 25 MICROGRAM(S): at 05:30

## 2023-01-31 RX ADMIN — GABAPENTIN 100 MILLIGRAM(S): 400 CAPSULE ORAL at 18:19

## 2023-01-31 RX ADMIN — OXYCODONE HYDROCHLORIDE 10 MILLIGRAM(S): 5 TABLET ORAL at 21:46

## 2023-01-31 RX ADMIN — SACUBITRIL AND VALSARTAN 1 TABLET(S): 24; 26 TABLET, FILM COATED ORAL at 05:24

## 2023-01-31 RX ADMIN — PANTOPRAZOLE SODIUM 40 MILLIGRAM(S): 20 TABLET, DELAYED RELEASE ORAL at 05:23

## 2023-01-31 RX ADMIN — ENOXAPARIN SODIUM 40 MILLIGRAM(S): 100 INJECTION SUBCUTANEOUS at 05:24

## 2023-01-31 RX ADMIN — LIDOCAINE 1 PATCH: 4 CREAM TOPICAL at 11:22

## 2023-01-31 RX ADMIN — MAGNESIUM OXIDE 400 MG ORAL TABLET 400 MILLIGRAM(S): 241.3 TABLET ORAL at 08:36

## 2023-01-31 NOTE — PROGRESS NOTE ADULT - SUBJECTIVE AND OBJECTIVE BOX
OVERNIGHT EVENTS: TANIA O/N patient on 4L of O2     SUBJECTIVE / INTERVAL HPI: Patient seen and examined at bedside.     VITAL SIGNS:  Vital Signs Last 24 Hrs  T(C): 36.7 (31 Jan 2023 12:57), Max: 37 (30 Jan 2023 21:04)  T(F): 98 (31 Jan 2023 12:57), Max: 98.6 (30 Jan 2023 21:04)  HR: 72 (31 Jan 2023 12:57) (60 - 87)  BP: 101/60 (31 Jan 2023 12:57) (93/54 - 117/60)  BP(mean): --  RR: 18 (31 Jan 2023 12:57) (18 - 18)  SpO2: 96% (31 Jan 2023 09:32) (96% - 98%)    Parameters below as of 31 Jan 2023 09:32  Patient On (Oxygen Delivery Method): nasal cannula  O2 Flow (L/min): 3      PHYSICAL EXAM:    General: Well developed, well nourished, no acute distress  HEENT: NC/AT; PERRL, anicteric sclera; MMM  Neck: supple  Cardiovascular: +S1/S2, RRR, no murmurs, rubs, gallops  Respiratory: CTA B/L; no W/R/R  Gastrointestinal: soft, NT/ND; +BSx4  Extremities: WWP; 1+ b/l pitting edema B/L LE, diminished pedal pulses  , clubbing or cyanosis  Neurological: AAOx3; no focal deficits    MEDICATIONS:  MEDICATIONS  (STANDING):  atorvastatin 80 milliGRAM(s) Oral at bedtime  budesonide 160 MICROgram(s)/formoterol 4.5 MICROgram(s) Inhaler 2 Puff(s) Inhalation two times a day  buMETAnide Infusion 1 mG/Hr (5 mL/Hr) IV Continuous <Continuous>  clopidogrel Tablet 75 milliGRAM(s) Oral daily  dextrose 5%. 1000 milliLiter(s) (50 mL/Hr) IV Continuous <Continuous>  dextrose 5%. 1000 milliLiter(s) (100 mL/Hr) IV Continuous <Continuous>  dextrose 50% Injectable 25 Gram(s) IV Push once  dextrose 50% Injectable 12.5 Gram(s) IV Push once  dextrose 50% Injectable 25 Gram(s) IV Push once  enoxaparin Injectable 40 milliGRAM(s) SubCutaneous every 24 hours  fenofibrate Tablet 145 milliGRAM(s) Oral daily  gabapentin 100 milliGRAM(s) Oral two times a day  glucagon  Injectable 1 milliGRAM(s) IntraMuscular once  insulin lispro (ADMELOG) corrective regimen sliding scale   SubCutaneous three times a day before meals  iron sucrose IVPB 200 milliGRAM(s) IV Intermittent every 24 hours  levothyroxine 25 MICROGram(s) Oral daily  magnesium oxide 400 milliGRAM(s) Oral three times a day with meals  metoprolol succinate ER 50 milliGRAM(s) Oral daily  pantoprazole    Tablet 40 milliGRAM(s) Oral before breakfast  polyethylene glycol 3350 17 Gram(s) Oral two times a day  sacubitril 24 mG/valsartan 26 mG 1 Tablet(s) Oral two times a day  senna 2 Tablet(s) Oral at bedtime  sodium chloride 3%. 150 milliLiter(s) (50 mL/Hr) IV Continuous <Continuous>    MEDICATIONS  (PRN):  dextrose Oral Gel 15 Gram(s) Oral once PRN Blood Glucose LESS THAN 70 milliGRAM(s)/deciliter  melatonin 5 milliGRAM(s) Oral at bedtime PRN Sleep  morphine  - Injectable 4 milliGRAM(s) IV Push every 4 hours PRN Severe Pain (7 - 10)  oxyCODONE    IR 10 milliGRAM(s) Oral every 6 hours PRN Moderate Pain (4 - 6)      ALLERGIES:  Allergies    No Known Allergies    Intolerances        LABS:                        9.8    4.65  )-----------( 165      ( 31 Jan 2023 06:54 )             31.1     01-31    139  |  95<L>  |  52<H>  ----------------------------<  102<H>  4.7   |  33<H>  |  1.8<H>    Ca    9.6      31 Jan 2023 06:54  Mg     2.1     01-31    TPro  6.7  /  Alb  3.6  /  TBili  1.1  /  DBili  x   /  AST  18  /  ALT  <5  /  AlkPhos  69  01-31        CAPILLARY BLOOD GLUCOSE      POCT Blood Glucose.: 116 mg/dL (31 Jan 2023 11:32)      RADIOLOGY & ADDITIONAL TESTS: Reviewed.    PLAN:

## 2023-01-31 NOTE — PROGRESS NOTE ADULT - ASSESSMENT
57 year old female with PMH of COPD on 4 L home O2, CORNELIUS on CPAP, HFrEF (19% on TTE 02/2022, s/p ICD, pulmonary HTN, CAD s/p PCI to RCA)1, HTN, HLD, CVA with residual LLE weakness, DM, active smoker on home O2 4L NC, presented to the ED for foot pain.  Patient does not know any of her meds or doses    SOB associated with  severe lower extremity edema due to acute on chronic HFrEF (19%)  Fluid Overload * pt was on torsemide 20mg po daily at home, holding home spironolactone   medication noncompliance     - clinically improving   - Appreciate HF Consult:   - Continue Bumex gtt at 1 mg/hr  Continue 3% Hypertonic Saline 150ml BID (If infused throughout a central line, run over one hour, if a peripheral line is to be used run over 3 hours)  - Entresto restarted 24-26 (1/30) BID  - metoprolol succinate to 50mg daily (from 100mg daily-1/30)   -Iron deficient (ferritin 77)- continue IV iron sucrose as 200 mg iv daily for 5 days  -Get BMP twice daily with magnesium    -Maintain potassium >4.0, Mg >2.2  -Strict intake and output  -Daily weight   - EP consult appreciated: plan for ICD per Dr Soto 2/13, continue with telemetry monitoring for now. Patient has life vest at home     Severe bilateral PAD  CAD s/p PCI / HTN   History of CVA  - Arterial duplex:  a) No flow is visualized in distal right SFA and proximal and mid left SFA.  b) Reconstitution in the popliteal artery bilaterally with diminished flow in tibial arteries.  -vascular cardiology on board, recommend cross sectional imaging once renal fxn improves  - c/w statin and plavix     AICD Site Infection PRIOR h/x   - now removed, no erythema or drainage around site   - CT chest reviewed not suggestive of collection in lt chest/breast area  - Blood cx :Staph haemolyticus in one bottle Likely contamination.    -Repeat blood cxs NGTD  - planned for  ICD placement with EP 2/13    hx of chronic hypoxic resp failure on 4 liter oxygen  due to COPD - stable   CORNELIUS  - Cont inhalers, CPAP     JEMIMA 2/2 HFrEF Exacerbation/diuresis   - baseline 1.3-1.4  - c/w diuresis per HF recs   - f/u Cr daily if worsens get Nephro consult f/u all electrolytes  - I/O's monitoring      DM-II   Suspected diabetic neuropathy  - hold home farxiga and metformin   - fingersticks and insulin   - started on gabapentin      Chronic Normocytic Anemia - stable, Venofer for 5 days per HF     Shoulder pain - likely from immobility - not responsive to oxycodone  - Patient reports itching from morphine  - trial oxycodone 10   . Ensure bowel regimen and PT    Overall prognosis poor given chronic medical conditions    Pending: diuresis, , ICD with ep and vascular cardiology f/u, pain control, diuresis, labs  Discussed with patient .

## 2023-01-31 NOTE — PROGRESS NOTE ADULT - SUBJECTIVE AND OBJECTIVE BOX
CHIEF COMPLAINT:    Patient is a 57y old  Female who presents with a chief complaint of SOB    INTERVAL HPI/OVERNIGHT EVENTS:    Patient seen and examined at bedside. No acute overnight events occurred.    ROS: Reports leg pain. All other systems are negative.    Medications:  Standing  atorvastatin 80 milliGRAM(s) Oral at bedtime  budesonide 160 MICROgram(s)/formoterol 4.5 MICROgram(s) Inhaler 2 Puff(s) Inhalation two times a day  buMETAnide Infusion 1 mG/Hr IV Continuous <Continuous>  clopidogrel Tablet 75 milliGRAM(s) Oral daily  dextrose 5%. 1000 milliLiter(s) IV Continuous <Continuous>  dextrose 5%. 1000 milliLiter(s) IV Continuous <Continuous>  dextrose 50% Injectable 25 Gram(s) IV Push once  dextrose 50% Injectable 12.5 Gram(s) IV Push once  dextrose 50% Injectable 25 Gram(s) IV Push once  enoxaparin Injectable 40 milliGRAM(s) SubCutaneous every 24 hours  fenofibrate Tablet 145 milliGRAM(s) Oral daily  gabapentin 100 milliGRAM(s) Oral two times a day  glucagon  Injectable 1 milliGRAM(s) IntraMuscular once  insulin lispro (ADMELOG) corrective regimen sliding scale   SubCutaneous three times a day before meals  iron sucrose IVPB 200 milliGRAM(s) IV Intermittent every 24 hours  levothyroxine 25 MICROGram(s) Oral daily  magnesium oxide 400 milliGRAM(s) Oral three times a day with meals  metoprolol succinate ER 50 milliGRAM(s) Oral daily  pantoprazole    Tablet 40 milliGRAM(s) Oral before breakfast  polyethylene glycol 3350 17 Gram(s) Oral two times a day  sacubitril 24 mG/valsartan 26 mG 1 Tablet(s) Oral two times a day  senna 2 Tablet(s) Oral at bedtime  sodium chloride 3%. 150 milliLiter(s) IV Continuous <Continuous>    PRN Meds  dextrose Oral Gel 15 Gram(s) Oral once PRN  melatonin 5 milliGRAM(s) Oral at bedtime PRN  morphine  - Injectable 4 milliGRAM(s) IV Push every 4 hours PRN  oxyCODONE    IR 10 milliGRAM(s) Oral every 6 hours PRN        Vital Signs:    T(F): 98 (01-31-23 @ 04:32), Max: 98.6 (01-30-23 @ 21:04)  HR: 60 (01-31-23 @ 04:32) (60 - 87)  BP: 98/56 (01-31-23 @ 09:32) (93/54 - 117/60)  RR: 18 (01-31-23 @ 04:32) (17 - 18)  SpO2: 96% (01-31-23 @ 09:32) (96% - 98%)  I&O's Summary    30 Jan 2023 07:01  -  31 Jan 2023 07:00  --------------------------------------------------------  IN: 2074 mL / OUT: 1952 mL / NET: 122 mL    31 Jan 2023 07:01  -  31 Jan 2023 12:14  --------------------------------------------------------  IN: 140 mL / OUT: 0 mL / NET: 140 mL        POCT Blood Glucose.: 116 mg/dL (31 Jan 2023 11:32)  POCT Blood Glucose.: 146 mg/dL (31 Jan 2023 07:34)  POCT Blood Glucose.: 151 mg/dL (30 Jan 2023 21:40)  POCT Blood Glucose.: 125 mg/dL (30 Jan 2023 16:54)      PHYSICAL EXAM:  GENERAL:  NAD  SKIN: No rashes or lesions  HEENT: Atraumatic. Normocephalic. Anicteric  NECK:  No JVD.   PULMONARY: Clear to ausculation bilaterally. No wheezing. No rales  CVS: Normal S1, S2. Regular rate and rhythm. No murmurs.  ABDOMEN/GI: Soft, Nontender, Nondistended; Bowel sounds are present  EXTREMITIES:  1+ b/l pitting edema B/L LE, diminished pedal pulses  NEUROLOGIC:  No motor deficit.  PSYCH: Alert & oriented x 3, normal affect      LABS:                        9.8    4.65  )-----------( 165      ( 31 Jan 2023 06:54 )             31.1     01-31    139  |  95<L>  |  52<H>  ----------------------------<  102<H>  4.7   |  33<H>  |  1.8<H>    Ca    9.6      31 Jan 2023 06:54  Mg     2.1     01-31    TPro  6.7  /  Alb  3.6  /  TBili  1.1  /  DBili  x   /  AST  18  /  ALT  <5  /  AlkPhos  69  01-31              RADIOLOGY & ADDITIONAL TESTS:  Imaging or report Personally Reviewed:  [ ] YES  [ ] NO -->no new images    Telemetry reviewed independently - NSR, no acute events  EKG reviewed independently -->no new EKGs    Consultant(s) Notes Reviewed:  [ ] YES  [ ] NO  Care Discussed with Consultants/Other Providers [ ] YES  [ ] NO    Case discussed with resident  Care discussed with pt

## 2023-01-31 NOTE — PROGRESS NOTE ADULT - SUBJECTIVE AND OBJECTIVE BOX
Date of Admission: 23    Interval History: Patient resting in bed, in NAD. As per documentation- Bumex gtt held for unknown amount of time overnight for BP of 90s/50s. Patient 24hr UOP net + 122cc. Remains overloaded.     Chief Complaint: Patient is a 57y old  Female who presents with a chief complaint of sob (2023 14:03)    HISTORY OF PRESENT ILLNESS: 57 year old female with PMH of COPD on 4 L home O2, CORNELIUS on CPAP, HFrEF (19% on TTE 2022, s/p ICD, pulmonary HTN, CAD s/p PCI to RCA)1, HTN, HLD, CVA with residual LLE weakness, DM, active smoker on home O2, presented to the ED for foot pain.   She reports that pain started in bilateral legs two days ago. She also noticed increased swelling in both legs. Pain was so intense, she could no longer ambulate or touch her feet. Symptoms are worse on her L leg. She also reports that her chronic shortness of breath has been worse than usual. She also endorses that she generally feels more swollen than usual.   Patient admitted for bilateral LE edema, CHF Exacerbation vs. Cellulitis (less likely)   Patient does not know any of her meds or doses; verify with ZEFR in the AM. Meds started using surescripts. (2023 03:19)      Patient well known to heart failure team from multiple previous admissions. Patient reports progressively worsening LE edema and tenderness x1 week. Reports her breathing is baseline (mild SOB 2/2 COPD). Endorses home medication compliance but is unaware of which medications she takes. She believes she is maintaining a low Na diet at home, however, she stated she orders Chinese food often. Denies chest pain, palpitations, n/v. +early satiety +decrease in appetite +cough.      PAST MEDICAL & SURGICAL HISTORY:  Hypertension  Hyperlipidemia  Anxiety and depression  COPD, severe  CHF (congestive heart failure)  Cerebrovascular accident (CVA)Multiple  Type 2 diabetes mellitus  CKD (chronic kidney disease), stage II  No significant past surgical history        FAMILY HISTORY:  No pertinent family history of premature cardiovascular disease in first degree relatives.  Mother:  of cancer at 65  Father:  of an overdose at 50    SOCIAL HISTORY: Former smoker- states she quit 1 week ago. Denies alcohol or drug use.       Allergies  No Known Allergies    Intolerances      PHYSICAL EXAM:  T(C): 36.7 (2023 12:57), Max: 37 (2023 21:04)  T(F): 98 (2023 12:57), Max: 98.6 (2023 21:04)  HR: 72 (2023 12:57) (60 - 87)  BP: 101/60 (2023 12:57) (93/54 - 117/60)  RR: 18 (2023 12:57) (18 - 18)  SpO2: 96% (2023 09:32) (96% - 98%)    O2 Parameters below as of 2023 09:32  Patient On (Oxygen Delivery Method): nasal cannula  O2 Flow (L/min): 3      General Appearance: normal for age and gender. 	  Neck: JVP [id number]  Eyes: Extra Ocular muscles intact.   Cardiovascular: regular rate and rhythm S1 S2,+2-3 B/L edema up to thighs  Respiratory: decreased B/L  Psychiatry: Alert and oriented x 3, Mood & affect appropriate  Gastrointestinal:  Soft, Non-tender  Skin/Integumentary : No rashes, No ecchymoses, No cyanosis	  Neurologic: Non-focal  Musculoskeletal/ extremities: Normal range of motion, No clubbing, cyanosis   Vascular: Peripheral pulses palpable 2+ bilaterally      CARDIAC MARKERS:  Serum Pro-Brain Natriuretic Peptide: 4892 pg/mL (22 @ 10:50)      TELEMETRY EVENTS: 	    EC23    Ventricular Rate 112 BPM  Atrial Rate 112 BPM  P-R Interval 172 ms  QRS Duration 134 ms  Q-T Interval 356 ms  QTC Calculation(Bazett) 485 ms  P Axis 54 degrees  R Axis 202 degrees  T Axis 51 degrees    Diagnosis Line Sinus tachycardia  Non-specific intra-ventricular conduction block  Lateral infarct , age undetermined  Abnormal ECG    Confirmed by Asad Buitrago ([id number]) on 2023 8:04:59 AM        PREVIOUS DIAGNOSTIC TESTING:      TTE 22      Summary:   1. Moderately decreased global left ventricular systolic function with   estimated EF of 25-30% with increased wall thickness.   2. The left ventricular diastolic function could not be assessed in this   study.   3. Mildly enlarged right ventricle (RVEDD = 4.3cm) with moderately   reduced function.   4. Diastolic septal wall flattening consistent with RV volume overload.   5. Mild biatrial enlargement.   6. Mitral valve with bileaflet tethering and resultant mild   regurgitation.   7. Severe tricuspid regurgitation with hepatic vein systolic flow   reversal.   8. Sclerotic aortic valve with normal opening and mild regurgitation.   9. Severe pulmonary hypertension. PASP is at least 65mmHg; this may be   underestimated due to severity of tricuspid regurgitation.  10. Moderate sized pericardial effusion localized posterior tothe right   atrium without echocardiographic evidence of tamponade physiology.  11. Compared to prior study, pericardial effusion size is stable;   findings are overall similar (pHTN severity was likely underestimated on   prior study 2/2 severe TR).      Home Medications:  Albuterol (Eqv-ProAir HFA) 90 mcg/inh inhalation aerosol: 2 puff(s) inhaled every 6 hours, As Needed (2021 11:22)  aspirin 81 mg oral tablet: 1 tab(s) orally once a day (2021 11:22)  fenofibrate 145 mg oral tablet: 1 tab(s) orally once a day (2021 11:22)  glipiZIDE 10 mg oral tablet: 1 tab(s) orally 2 times a day (2021 11:22)  Lovaza 1000 mg oral capsule: 2 cap(s) orally 2 times a day (2021 14:52)  metFORMIN 1000 mg oral tablet: 1 tab(s) orally 2 times a day Do not take until  (2021 12:58)  Plavix 75 mg oral tablet: 1 tab(s) orally once a day (2021 11:22)  Vitamin D2 1.25 mg (50,000 intl units) oral capsule: 1 cap(s) orally once a week (2021 14:52)    MEDICATIONS  (STANDING):  aspirin  chewable 81 milliGRAM(s) Oral daily  atorvastatin 80 milliGRAM(s) Oral at bedtime  budesonide 160 MICROgram(s)/formoterol 4.5 MICROgram(s) Inhaler 2 Puff(s) Inhalation two times a day  chlorhexidine 4% Liquid 1 Application(s) Topical <User Schedule>  clopidogrel Tablet 75 milliGRAM(s) Oral daily  enoxaparin Injectable 40 milliGRAM(s) SubCutaneous daily  fenofibrate Tablet 145 milliGRAM(s) Oral daily  furosemide   Injectable 40 milliGRAM(s) IV Push two times a day  magnesium sulfate  IVPB 2 Gram(s) IV Intermittent once  metoprolol succinate ER 50 milliGRAM(s) Oral daily  omega-3-Acid Ethyl Esters 2 Gram(s) Oral two times a day  pantoprazole    Tablet 40 milliGRAM(s) Oral before breakfast  sacubitril 49 mG/valsartan 51 mG 1 Tablet(s) Oral two times a day    MEDICATIONS  (PRN):  ALBUTerol    90 MICROgram(s) HFA Inhaler 2 Puff(s) Inhalation every 6 hours PRN Shortness of Breath and/or Wheezing

## 2023-01-31 NOTE — PROGRESS NOTE ADULT - ASSESSMENT
56 yo F PMHx  COPD on 4 L home O2, CORNELIUS on CPAP, chronic HFrEF (19% on TTE 02/2022, s/p ICD, pulmonary HTN, CAD s/p PCI to RCA)1, HTN, HLD, CVA with residual LLE weakness, DM, recent ICD infection s/o removal, active smoker on home O2 4L NC, presented to the ED for foot pain.     Acute on chronic HFrEF  - had SOB on admission, siginficant LE edema  - pt is often non adherant with her medicaitons  - bnp 26,875 on admission, much higher than prior  - remains fluid overloaded clinically but improving   - c/w Hypertonic Saline and Bumex ggt as pt remains overloaded  - f/u K/Mg/Cr twice a day  * pt was on torsemide 20mg po daily at home, holding home spironolactone due to elevate rean lfunction  - Cont entresto   - Cont metoprolol   - EP consult appreciated: plan for ICD per Dr Soto on 2/13, continue with telemetry monitoring for now  - c/w venofer infusion, ends 2/3/23    Severe bilateral PAD  CAD s/p PCI / HTN   History of CVA  - Arterial duplex:  a) No flow is visualized in distal right SFA and proximal and mid left SFA.  b) Reconstitution in the popliteal artery bilaterally with diminished flow in tibial arteries.  -vascular cardiology on board, recommend cross sectional imaging once renal fxn improves  - c/w statin and plavix     AICD Site Infection PRIOR h/x   - now removed, no erythema or drainage around site   - CT chest reviewed not suggestive of collection in lt chest/breast area  - Blood cx :Staph haemolyticus in one bottle Likely contamination.    -Repeat blood cxs NGTD  - will need ICD placement with EP as outpt on 2/13    hx of chronic hypoxic resp failure on 4 liter oxygen  due to COPD - stable   CORNELIUS  - Cont inhalers, CPAP     JEMIMA 2/2 HFrEF Exacerbation/diuresis   - baseline 1.3-1.4  - c/w diuresis per HF recs   - f/u Cr daily if worsens get Nephro consult f/u all electrolytes  - I/O's monitoring      DM-II   - hold home farxiga and metformin   - fingersticks and insulin   - started on gabapentin for suspected diabetic neuropathy, suspect that leg pain is more for arterial occlusion     Chronic Normocytic Anemia - stable    Shoulder pain - likely from immobility - not responsive to oxycodone  - trial of morphine. Ensure bowel regimen and PT    Overall prognosis poor given chronic medical conditions    Pending: arterial occlusion work up, diuresis  Discussed with patient .

## 2023-01-31 NOTE — PROGRESS NOTE ADULT - ASSESSMENT
Assessment: 57 year old female with PMH of COPD on 4 L home O2, CORNELIUS on CPAP, HFrEF, s/p ICD, pulmonary HTN, CAD s/p PCI to RCA)1, HTN, HLD, CVA with residual LLE weakness, DM, active smoker on home O2, presented to the ED for foot pain. Patient admitted for bilateral LE edema, CHF Exacerbation vs. Cellulitis. Heart failure team consulted to assist with fluid overload/acute decompensated heart failure management in the setting of JEMIMA.     Plan:  Patient remains fluid overloaded on exam  POCUS exam- IVC 2.7cm dilated, non collapsing with respirations  Continue Bumex gtt at 1 mg/hr  Continue 3% Hypertonic Saline 150ml BID (If infused throughout a central line, run over one hour, if a peripheral line is to be used run over 3 hours)  Continue metoprolol succinate 50mg daily   Continue Entresto 24-26mg BID  Continue to hold spironolactone for now  Get BMP twice daily with magnesium    Mag 2.1- replete   Maintain potassium >4.0, Mg >2.2  Strict intake and output  Daily weight   Iron deficient (ferritin 77)- continue IV iron sucrose as 200 mg iv daily for 5 days  ICD placement as per EP  Vascular cardiology following for PVD   Plan discussed with patient and primary team  Will continue to follow

## 2023-02-01 LAB
ALBUMIN SERPL ELPH-MCNC: 3.5 G/DL — SIGNIFICANT CHANGE UP (ref 3.5–5.2)
ALP SERPL-CCNC: 68 U/L — SIGNIFICANT CHANGE UP (ref 30–115)
ALT FLD-CCNC: <5 U/L — SIGNIFICANT CHANGE UP (ref 0–41)
ANION GAP SERPL CALC-SCNC: 12 MMOL/L — SIGNIFICANT CHANGE UP (ref 7–14)
ANION GAP SERPL CALC-SCNC: 12 MMOL/L — SIGNIFICANT CHANGE UP (ref 7–14)
AST SERPL-CCNC: 16 U/L — SIGNIFICANT CHANGE UP (ref 0–41)
BASOPHILS # BLD AUTO: 0.07 K/UL — SIGNIFICANT CHANGE UP (ref 0–0.2)
BASOPHILS NFR BLD AUTO: 1.6 % — HIGH (ref 0–1)
BILIRUB SERPL-MCNC: 1.1 MG/DL — SIGNIFICANT CHANGE UP (ref 0.2–1.2)
BUN SERPL-MCNC: 51 MG/DL — HIGH (ref 10–20)
BUN SERPL-MCNC: 52 MG/DL — HIGH (ref 10–20)
CALCIUM SERPL-MCNC: 9.2 MG/DL — SIGNIFICANT CHANGE UP (ref 8.4–10.5)
CALCIUM SERPL-MCNC: 9.4 MG/DL — SIGNIFICANT CHANGE UP (ref 8.4–10.5)
CHLORIDE SERPL-SCNC: 95 MMOL/L — LOW (ref 98–110)
CHLORIDE SERPL-SCNC: 95 MMOL/L — LOW (ref 98–110)
CO2 SERPL-SCNC: 34 MMOL/L — HIGH (ref 17–32)
CO2 SERPL-SCNC: 35 MMOL/L — HIGH (ref 17–32)
CREAT SERPL-MCNC: 1.8 MG/DL — HIGH (ref 0.7–1.5)
CREAT SERPL-MCNC: 1.8 MG/DL — HIGH (ref 0.7–1.5)
EGFR: 33 ML/MIN/1.73M2 — LOW
EGFR: 33 ML/MIN/1.73M2 — LOW
EOSINOPHIL # BLD AUTO: 0.22 K/UL — SIGNIFICANT CHANGE UP (ref 0–0.7)
EOSINOPHIL NFR BLD AUTO: 4.9 % — SIGNIFICANT CHANGE UP (ref 0–8)
GLUCOSE BLDC GLUCOMTR-MCNC: 100 MG/DL — HIGH (ref 70–99)
GLUCOSE BLDC GLUCOMTR-MCNC: 133 MG/DL — HIGH (ref 70–99)
GLUCOSE BLDC GLUCOMTR-MCNC: 145 MG/DL — HIGH (ref 70–99)
GLUCOSE BLDC GLUCOMTR-MCNC: 165 MG/DL — HIGH (ref 70–99)
GLUCOSE SERPL-MCNC: 124 MG/DL — HIGH (ref 70–99)
GLUCOSE SERPL-MCNC: 147 MG/DL — HIGH (ref 70–99)
HCT VFR BLD CALC: 28.9 % — LOW (ref 37–47)
HGB BLD-MCNC: 9.4 G/DL — LOW (ref 12–16)
IMM GRANULOCYTES NFR BLD AUTO: 0.4 % — HIGH (ref 0.1–0.3)
LYMPHOCYTES # BLD AUTO: 0.77 K/UL — LOW (ref 1.2–3.4)
LYMPHOCYTES # BLD AUTO: 17.2 % — LOW (ref 20.5–51.1)
MAGNESIUM SERPL-MCNC: 2 MG/DL — SIGNIFICANT CHANGE UP (ref 1.8–2.4)
MAGNESIUM SERPL-MCNC: 2 MG/DL — SIGNIFICANT CHANGE UP (ref 1.8–2.4)
MCHC RBC-ENTMCNC: 28.2 PG — SIGNIFICANT CHANGE UP (ref 27–31)
MCHC RBC-ENTMCNC: 32.5 G/DL — SIGNIFICANT CHANGE UP (ref 32–37)
MCV RBC AUTO: 86.8 FL — SIGNIFICANT CHANGE UP (ref 81–99)
MONOCYTES # BLD AUTO: 0.58 K/UL — SIGNIFICANT CHANGE UP (ref 0.1–0.6)
MONOCYTES NFR BLD AUTO: 13 % — HIGH (ref 1.7–9.3)
NEUTROPHILS # BLD AUTO: 2.81 K/UL — SIGNIFICANT CHANGE UP (ref 1.4–6.5)
NEUTROPHILS NFR BLD AUTO: 62.9 % — SIGNIFICANT CHANGE UP (ref 42.2–75.2)
NRBC # BLD: 0 /100 WBCS — SIGNIFICANT CHANGE UP (ref 0–0)
PLATELET # BLD AUTO: 159 K/UL — SIGNIFICANT CHANGE UP (ref 130–400)
POTASSIUM SERPL-MCNC: 4 MMOL/L — SIGNIFICANT CHANGE UP (ref 3.5–5)
POTASSIUM SERPL-MCNC: 4.3 MMOL/L — SIGNIFICANT CHANGE UP (ref 3.5–5)
POTASSIUM SERPL-SCNC: 4 MMOL/L — SIGNIFICANT CHANGE UP (ref 3.5–5)
POTASSIUM SERPL-SCNC: 4.3 MMOL/L — SIGNIFICANT CHANGE UP (ref 3.5–5)
PROT SERPL-MCNC: 6.6 G/DL — SIGNIFICANT CHANGE UP (ref 6–8)
RBC # BLD: 3.33 M/UL — LOW (ref 4.2–5.4)
RBC # FLD: 18.3 % — HIGH (ref 11.5–14.5)
SODIUM SERPL-SCNC: 141 MMOL/L — SIGNIFICANT CHANGE UP (ref 135–146)
SODIUM SERPL-SCNC: 142 MMOL/L — SIGNIFICANT CHANGE UP (ref 135–146)
WBC # BLD: 4.47 K/UL — LOW (ref 4.8–10.8)
WBC # FLD AUTO: 4.47 K/UL — LOW (ref 4.8–10.8)

## 2023-02-01 PROCEDURE — 99233 SBSQ HOSP IP/OBS HIGH 50: CPT

## 2023-02-01 RX ADMIN — Medication 50 MILLIGRAM(S): at 05:14

## 2023-02-01 RX ADMIN — SACUBITRIL AND VALSARTAN 1 TABLET(S): 24; 26 TABLET, FILM COATED ORAL at 18:18

## 2023-02-01 RX ADMIN — SODIUM CHLORIDE 50 MILLILITER(S): 5 INJECTION, SOLUTION INTRAVENOUS at 07:00

## 2023-02-01 RX ADMIN — SENNA PLUS 2 TABLET(S): 8.6 TABLET ORAL at 21:23

## 2023-02-01 RX ADMIN — BUDESONIDE AND FORMOTEROL FUMARATE DIHYDRATE 2 PUFF(S): 160; 4.5 AEROSOL RESPIRATORY (INHALATION) at 20:27

## 2023-02-01 RX ADMIN — BUDESONIDE AND FORMOTEROL FUMARATE DIHYDRATE 2 PUFF(S): 160; 4.5 AEROSOL RESPIRATORY (INHALATION) at 10:17

## 2023-02-01 RX ADMIN — OXYCODONE HYDROCHLORIDE 10 MILLIGRAM(S): 5 TABLET ORAL at 18:19

## 2023-02-01 RX ADMIN — SODIUM CHLORIDE 50 MILLILITER(S): 5 INJECTION, SOLUTION INTRAVENOUS at 18:53

## 2023-02-01 RX ADMIN — OXYCODONE HYDROCHLORIDE 10 MILLIGRAM(S): 5 TABLET ORAL at 10:16

## 2023-02-01 RX ADMIN — BUMETANIDE 5 MG/HR: 0.25 INJECTION INTRAMUSCULAR; INTRAVENOUS at 18:18

## 2023-02-01 RX ADMIN — MORPHINE SULFATE 4 MILLIGRAM(S): 50 CAPSULE, EXTENDED RELEASE ORAL at 14:41

## 2023-02-01 RX ADMIN — OXYCODONE HYDROCHLORIDE 10 MILLIGRAM(S): 5 TABLET ORAL at 18:54

## 2023-02-01 RX ADMIN — MAGNESIUM OXIDE 400 MG ORAL TABLET 400 MILLIGRAM(S): 241.3 TABLET ORAL at 12:21

## 2023-02-01 RX ADMIN — Medication 145 MILLIGRAM(S): at 11:25

## 2023-02-01 RX ADMIN — Medication 1: at 08:13

## 2023-02-01 RX ADMIN — MAGNESIUM OXIDE 400 MG ORAL TABLET 400 MILLIGRAM(S): 241.3 TABLET ORAL at 08:14

## 2023-02-01 RX ADMIN — IRON SUCROSE 110 MILLIGRAM(S): 20 INJECTION, SOLUTION INTRAVENOUS at 12:21

## 2023-02-01 RX ADMIN — CLOPIDOGREL BISULFATE 75 MILLIGRAM(S): 75 TABLET, FILM COATED ORAL at 11:26

## 2023-02-01 RX ADMIN — PANTOPRAZOLE SODIUM 40 MILLIGRAM(S): 20 TABLET, DELAYED RELEASE ORAL at 08:35

## 2023-02-01 RX ADMIN — GABAPENTIN 100 MILLIGRAM(S): 400 CAPSULE ORAL at 18:18

## 2023-02-01 RX ADMIN — Medication 25 MICROGRAM(S): at 05:14

## 2023-02-01 RX ADMIN — OXYCODONE HYDROCHLORIDE 10 MILLIGRAM(S): 5 TABLET ORAL at 05:14

## 2023-02-01 RX ADMIN — MORPHINE SULFATE 4 MILLIGRAM(S): 50 CAPSULE, EXTENDED RELEASE ORAL at 15:00

## 2023-02-01 RX ADMIN — SODIUM CHLORIDE 50 MILLILITER(S): 5 INJECTION, SOLUTION INTRAVENOUS at 06:01

## 2023-02-01 RX ADMIN — GABAPENTIN 100 MILLIGRAM(S): 400 CAPSULE ORAL at 05:14

## 2023-02-01 RX ADMIN — ATORVASTATIN CALCIUM 80 MILLIGRAM(S): 80 TABLET, FILM COATED ORAL at 21:23

## 2023-02-01 RX ADMIN — SACUBITRIL AND VALSARTAN 1 TABLET(S): 24; 26 TABLET, FILM COATED ORAL at 05:14

## 2023-02-01 RX ADMIN — ENOXAPARIN SODIUM 40 MILLIGRAM(S): 100 INJECTION SUBCUTANEOUS at 05:14

## 2023-02-01 RX ADMIN — SODIUM CHLORIDE 50 MILLILITER(S): 5 INJECTION, SOLUTION INTRAVENOUS at 08:00

## 2023-02-01 RX ADMIN — OXYCODONE HYDROCHLORIDE 10 MILLIGRAM(S): 5 TABLET ORAL at 11:00

## 2023-02-01 RX ADMIN — MAGNESIUM OXIDE 400 MG ORAL TABLET 400 MILLIGRAM(S): 241.3 TABLET ORAL at 18:18

## 2023-02-01 NOTE — PROGRESS NOTE ADULT - ASSESSMENT
Assessment: 57 year old female with PMH of COPD on 4 L home O2, CORNELIUS on CPAP, HFrEF, s/p ICD, pulmonary HTN, CAD s/p PCI to RCA)1, HTN, HLD, CVA with residual LLE weakness, DM, active smoker on home O2, presented to the ED for foot pain. Patient admitted for bilateral LE edema, CHF Exacerbation vs. Cellulitis. Heart failure team consulted to assist with fluid overload/acute decompensated heart failure management in the setting of JEMIMA.     Plan:  Patient remains fluid overloaded on exam but is clinically improving  POCUS exam- IVC 2.4cm dilated, non collapsing with respirations  Continue Bumex gtt at 1 mg/hr  Continue 3% Hypertonic Saline 150ml BID (If infused throughout a central line, run over one hour, if a peripheral line is to be used run over 3 hours)  Continue metoprolol succinate 50mg daily   Continue Entresto 24-26mg BID  Continue to hold spironolactone for now  Get BMP twice daily with magnesium    Mag 2.0- replete   Maintain potassium >4.0, Mg >2.2  Strict intake and output  Daily weight   Iron deficient (ferritin 77)- continue IV iron sucrose as 200 mg iv daily for 5 days  ICD placement as per EP  Vascular cardiology following for PVD- would follow up with them today as patient is anxious to leave by the end of this week  Plan discussed with patient and primary team  Will continue to follow Assessment: 57 year old female with PMH of COPD on 4 L home O2, CORNELIUS on CPAP, HFrEF, s/p ICD, pulmonary HTN, CAD s/p PCI to RCA)1, HTN, HLD, CVA with residual LLE weakness, DM, active smoker on home O2, presented to the ED for foot pain. Patient admitted for bilateral LE edema, CHF Exacerbation vs. Cellulitis. Heart failure team consulted to assist with fluid overload/acute decompensated heart failure management in the setting of JEMIMA.     Plan:  Patient remains fluid overloaded on exam but is clinically improving  POCUS exam- IVC 2.4cm dilated, non collapsing with respirations  Continue Bumex gtt at 1 mg/hr  Continue 3% Hypertonic Saline 150ml BID (If infused throughout a central line, run over one hour, if a peripheral line is to be used run over 3 hours)  If continued improvement, will possibly switch to oral diuretics tomorrow  Continue metoprolol succinate 50mg daily   Continue Entresto 24-26mg BID  Continue to hold spironolactone for now  Get BMP twice daily with magnesium    Mag 2.0- replete   Maintain potassium >4.0, Mg >2.2  Strict intake and output  Daily weight   Iron deficient (ferritin 77)- continue IV iron sucrose as 200 mg iv daily for 5 days  ICD placement as per EP  Vascular cardiology following for PVD- would follow up with them today as patient is anxious to leave by the end of this week  Plan discussed with patient and primary team  Will continue to follow

## 2023-02-01 NOTE — PROGRESS NOTE ADULT - SUBJECTIVE AND OBJECTIVE BOX
OVERNIGHT EVENTS:    SUBJECTIVE / INTERVAL HPI: Patient seen and examined at bedside.     VITAL SIGNS:  Vital Signs Last 24 Hrs  T(C): 36.3 (01 Feb 2023 13:19), Max: 36.7 (31 Jan 2023 20:35)  T(F): 97.3 (01 Feb 2023 13:19), Max: 98 (31 Jan 2023 20:35)  HR: 75 (01 Feb 2023 13:19) (75 - 86)  BP: 127/60 (01 Feb 2023 13:19) (99/57 - 127/60)  BP(mean): --  RR: 18 (01 Feb 2023 13:19) (18 - 18)  SpO2: --    Parameters below as of 31 Jan 2023 23:54  Patient On (Oxygen Delivery Method): nasal cannula        PHYSICAL EXAM:    General: Well developed, well nourished, no acute distress  HEENT: NC/AT; PERRL, anicteric sclera;   Neck: supple  Cardiovascular: +S1/S2, RRR, no murmurs  Respiratory: CTA B/L; no W/R/R  Gastrointestinal: soft, Slightly distended abdomen; slight discomfort described as pressure to deep palpation  +BSx4  Extremities: WWP; no edema, clubbing or cyanosis  Vascular: 2+ radial, DP/PT pulses B/L  Neurological: AAOx3; no focal deficits    MEDICATIONS:  MEDICATIONS  (STANDING):  atorvastatin 80 milliGRAM(s) Oral at bedtime  budesonide 160 MICROgram(s)/formoterol 4.5 MICROgram(s) Inhaler 2 Puff(s) Inhalation two times a day  buMETAnide Infusion 1 mG/Hr (5 mL/Hr) IV Continuous <Continuous>  clopidogrel Tablet 75 milliGRAM(s) Oral daily  dextrose 5%. 1000 milliLiter(s) (50 mL/Hr) IV Continuous <Continuous>  dextrose 5%. 1000 milliLiter(s) (100 mL/Hr) IV Continuous <Continuous>  dextrose 50% Injectable 25 Gram(s) IV Push once  dextrose 50% Injectable 12.5 Gram(s) IV Push once  dextrose 50% Injectable 25 Gram(s) IV Push once  enoxaparin Injectable 40 milliGRAM(s) SubCutaneous every 24 hours  fenofibrate Tablet 145 milliGRAM(s) Oral daily  gabapentin 100 milliGRAM(s) Oral two times a day  glucagon  Injectable 1 milliGRAM(s) IntraMuscular once  insulin lispro (ADMELOG) corrective regimen sliding scale   SubCutaneous three times a day before meals  iron sucrose IVPB 200 milliGRAM(s) IV Intermittent every 24 hours  levothyroxine 25 MICROGram(s) Oral daily  magnesium oxide 400 milliGRAM(s) Oral three times a day with meals  metoprolol succinate ER 50 milliGRAM(s) Oral daily  pantoprazole    Tablet 40 milliGRAM(s) Oral before breakfast  polyethylene glycol 3350 17 Gram(s) Oral two times a day  sacubitril 24 mG/valsartan 26 mG 1 Tablet(s) Oral two times a day  senna 2 Tablet(s) Oral at bedtime  sodium chloride 3%. 150 milliLiter(s) (50 mL/Hr) IV Continuous <Continuous>  sodium chloride 3%. 150 milliLiter(s) (50 mL/Hr) IV Continuous <Continuous>    MEDICATIONS  (PRN):  dextrose Oral Gel 15 Gram(s) Oral once PRN Blood Glucose LESS THAN 70 milliGRAM(s)/deciliter  melatonin 5 milliGRAM(s) Oral at bedtime PRN Sleep  morphine  - Injectable 4 milliGRAM(s) IV Push every 4 hours PRN Severe Pain (7 - 10)  oxyCODONE    IR 10 milliGRAM(s) Oral every 6 hours PRN Moderate Pain (4 - 6)      ALLERGIES:  Allergies    No Known Allergies    Intolerances        LABS:                        9.4    4.47  )-----------( 159      ( 01 Feb 2023 07:37 )             28.9     02-01    141  |  95<L>  |  51<H>  ----------------------------<  147<H>  4.0   |  34<H>  |  1.8<H>    Ca    9.2      01 Feb 2023 07:37  Mg     2.0     02-01    TPro  6.6  /  Alb  3.5  /  TBili  1.1  /  DBili  x   /  AST  16  /  ALT  <5  /  AlkPhos  68  02-01        CAPILLARY BLOOD GLUCOSE      POCT Blood Glucose.: 100 mg/dL (01 Feb 2023 11:27)      RADIOLOGY & ADDITIONAL TESTS: Reviewed.    PLAN:

## 2023-02-01 NOTE — PROGRESS NOTE ADULT - ASSESSMENT
56 yo F PMHx  COPD on 4 L home O2, CORNELIUS on CPAP, chronic HFrEF (19% on TTE 02/2022, s/p ICD, pulmonary HTN, CAD s/p PCI to RCA)1, HTN, HLD, CVA with residual LLE weakness, DM, recent ICD infection s/o removal, active smoker on home O2 4L NC, presented to the ED for foot pain.     Acute on chronic HFrEF  SOB and Leg edema, Pro-BNP 26K,  much higher than prior   Hypertonic Saline and Bumex ggt as pt remains overloaded  Continue Metoprolol and Entresto.   Heart failure follow up.   - EP consult appreciated: plan for ICD per Dr Soto on 2/13, continue with telemetry monitoring for now  - c/w venofer infusion, ends 2/3/23    Severe bilateral PAD  CAD s/p PCI / HTN   History of CVA  - Arterial duplex:  a) No flow is visualized in distal right SFA and proximal and mid left SFA.  b) Reconstitution in the popliteal artery bilaterally with diminished flow in tibial arteries.  -vascular cardiology on board, recommend cross sectional imaging once renal fxn improves  - c/w statin and plavix     AICD Site Infection PRIOR h/x   - now removed, no erythema or drainage around site   - CT chest reviewed not suggestive of collection in lt chest/breast area  - Blood cx :Staph haemolyticus in one bottle Likely contamination.    -Repeat blood cxs NGTD  - will need ICD placement with EP as outpt on 2/13    hx of chronic hypoxic resp failure on 4 liter oxygen  due to COPD - stable   CORNELIUS  - Cont inhalers, CPAP     JEMIMA 2/2 HFrEF Exacerbation/diuresis   - baseline 1.3-1.4  - c/w diuresis per HF recs   - f/u Cr daily if worsens get Nephro consult f/u all electrolytes  - I/O's monitoring      DM-II   - hold home farxiga and metformin   - fingersticks and insulin   - started on gabapentin for suspected diabetic neuropathy, suspect that leg pain is more for arterial occlusion     Chronic Normocytic Anemia - stable    Shoulder pain - likely from immobility - not responsive to oxycodone  - trial of morphine. Ensure bowel regimen and PT    Overall prognosis poor given chronic medical conditions    Pending: arterial occlusion work up, diuresis  Discussed with patient .

## 2023-02-01 NOTE — PROGRESS NOTE ADULT - SUBJECTIVE AND OBJECTIVE BOX
Date of Admission: 23    Interval History: Patient resting in bed, in NAD. Continues to c/o B/L LE pain, but reports feeling much better otherwise- denies SOB, feels improvement in LE edema. Remains on Bumex gtt with 3% hypertonic saline BID. 3.3L 24hr UOP. Kidney function stable for now. 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     Chief Complaint: Patient is a 57y old  Female who presents with a chief complaint of sob (2023 14:03)    HISTORY OF PRESENT ILLNESS: 57 year old female with PMH of COPD on 4 L home O2, CORNELIUS on CPAP, HFrEF (19% on TTE 2022, s/p ICD, pulmonary HTN, CAD s/p PCI to RCA)1, HTN, HLD, CVA with residual LLE weakness, DM, active smoker on home O2, presented to the ED for foot pain.   She reports that pain started in bilateral legs two days ago. She also noticed increased swelling in both legs. Pain was so intense, she could no longer ambulate or touch her feet. Symptoms are worse on her L leg. She also reports that her chronic shortness of breath has been worse than usual. She also endorses that she generally feels more swollen than usual.   Patient admitted for bilateral LE edema, CHF Exacerbation vs. Cellulitis (less likely)   Patient does not know any of her meds or doses; verify with [employer] in the AM. Meds started using surescripts. (2023 03:19)      Patient well known to heart failure team from multiple previous admissions. Patient reports progressively worsening LE edema and tenderness x1 week. Reports her breathing is baseline (mild SOB 2/2 COPD). Endorses home medication compliance but is unaware of which medications she takes. She believes she is maintaining a low Na diet at home, however, she stated she orders Chinese food often. Denies chest pain, palpitations, n/v. +early satiety +decrease in appetite +cough.      PAST MEDICAL & SURGICAL HISTORY:  Hypertension  Hyperlipidemia  Anxiety and depression  COPD, severe  CHF (congestive heart failure)  Cerebrovascular accident (CVA)Multiple  Type 2 diabetes mellitus  CKD (chronic kidney disease), stage II  No significant past surgical history        FAMILY HISTORY:  No pertinent family history of premature cardiovascular disease in first degree relatives.  Mother:  of cancer at 65  Father:  of an overdose at 50    SOCIAL HISTORY: Former smoker- states she quit 1 week ago. Denies alcohol or drug use.       Allergies  No Known Allergies    Intolerances      PHYSICAL EXAM:  ICU Vital Signs Last 24 Hrs  T(C): 36.3 (2023 13:19), Max: 36.7 (2023 20:35)  T(F): 97.3 (2023 13:19), Max: 98 (2023 20:35)  HR: 75 (2023 13:19) (75 - 86)  BP: 127/60 (2023 13:19) (99/57 - 127/60)  RR: 18 (2023 13:19) (18 - 18)  SpO2: --    O2 Parameters below as of 2023 23:54  Patient On (Oxygen Delivery Method): nasal cannula      General Appearance: normal for age and gender. 	  Neck: JVP [id number]  Eyes: Extra Ocular muscles intact.   Cardiovascular: regular rate and rhythm S1 S2,+2 B/L edema (improving)  Respiratory: decreased B/L  Psychiatry: Alert and oriented x 3, Mood & affect appropriate  Gastrointestinal:  Soft, Non-tender  Skin/Integumentary : No rashes, No ecchymoses, No cyanosis	  Neurologic: Non-focal  Musculoskeletal/ extremities: Normal range of motion, No clubbing, cyanosis   Vascular: Peripheral pulses palpable 2+ bilaterally        CARDIAC MARKERS:  Serum Pro-Brain Natriuretic Peptide: 4892 pg/mL (22 @ 10:50)      TELEMETRY EVENTS: 	    EC23    Ventricular Rate 112 BPM  Atrial Rate 112 BPM  P-R Interval 172 ms  QRS Duration 134 ms  Q-T Interval 356 ms  QTC Calculation(Bazett) 485 ms  P Axis 54 degrees  R Axis 202 degrees  T Axis 51 degrees    Diagnosis Line Sinus tachycardia  Non-specific intra-ventricular conduction block  Lateral infarct , age undetermined  Abnormal ECG    Confirmed by Asad Buitrago ([id number]) on 2023 8:04:59 AM        PREVIOUS DIAGNOSTIC TESTING:      TTE 22      Summary:   1. Moderately decreased global left ventricular systolic function with   estimated EF of 25-30% with increased wall thickness.   2. The left ventricular diastolic function could not be assessed in this   study.   3. Mildly enlarged right ventricle (RVEDD = 4.3cm) with moderately   reduced function.   4. Diastolic septal wall flattening consistent with RV volume overload.   5. Mild biatrial enlargement.   6. Mitral valve with bileaflet tethering and resultant mild   regurgitation.   7. Severe tricuspid regurgitation with hepatic vein systolic flow   reversal.   8. Sclerotic aortic valve with normal opening and mild regurgitation.   9. Severe pulmonary hypertension. PASP is at least 65mmHg; this may be   underestimated due to severity of tricuspid regurgitation.  10. Moderate sized pericardial effusion localized posterior tothe right   atrium without echocardiographic evidence of tamponade physiology.  11. Compared to prior study, pericardial effusion size is stable;   findings are overall similar (pHTN severity was likely underestimated on   prior study 2/2 severe TR).      Home Medications:  Albuterol (Eqv-ProAir HFA) 90 mcg/inh inhalation aerosol: 2 puff(s) inhaled every 6 hours, As Needed (2021 11:22)  aspirin 81 mg oral tablet: 1 tab(s) orally once a day (2021 11:22)  fenofibrate 145 mg oral tablet: 1 tab(s) orally once a day (2021 11:22)  glipiZIDE 10 mg oral tablet: 1 tab(s) orally 2 times a day (2021 11:22)  Lovaza 1000 mg oral capsule: 2 cap(s) orally 2 times a day (2021 14:52)  metFORMIN 1000 mg oral tablet: 1 tab(s) orally 2 times a day Do not take until  (2021 12:58)  Plavix 75 mg oral tablet: 1 tab(s) orally once a day (2021 11:22)  Vitamin D2 1.25 mg (50,000 intl units) oral capsule: 1 cap(s) orally once a week (2021 14:52)    MEDICATIONS  (STANDING):  aspirin  chewable 81 milliGRAM(s) Oral daily  atorvastatin 80 milliGRAM(s) Oral at bedtime  budesonide 160 MICROgram(s)/formoterol 4.5 MICROgram(s) Inhaler 2 Puff(s) Inhalation two times a day  chlorhexidine 4% Liquid 1 Application(s) Topical <User Schedule>  clopidogrel Tablet 75 milliGRAM(s) Oral daily  enoxaparin Injectable 40 milliGRAM(s) SubCutaneous daily  fenofibrate Tablet 145 milliGRAM(s) Oral daily  furosemide   Injectable 40 milliGRAM(s) IV Push two times a day  magnesium sulfate  IVPB 2 Gram(s) IV Intermittent once  metoprolol succinate ER 50 milliGRAM(s) Oral daily  omega-3-Acid Ethyl Esters 2 Gram(s) Oral two times a day  pantoprazole    Tablet 40 milliGRAM(s) Oral before breakfast  sacubitril 49 mG/valsartan 51 mG 1 Tablet(s) Oral two times a day    MEDICATIONS  (PRN):  ALBUTerol    90 MICROgram(s) HFA Inhaler 2 Puff(s) Inhalation every 6 hours PRN Shortness of Breath and/or Wheezing

## 2023-02-01 NOTE — PROGRESS NOTE ADULT - ASSESSMENT
57 year old female with PMH of COPD on 4 L home O2, CORNELIUS on CPAP, HFrEF (19% on TTE 02/2022, s/p ICD, pulmonary HTN, CAD s/p PCI to RCA)1, HTN, HLD, CVA with residual LLE weakness, DM, active smoker on home O2 4L NC, presented to the ED for foot pain.  Patient does not know any of her meds or doses    SOB associated with  severe lower extremity edema due to acute on chronic HFrEF (19%)  Fluid Overload * pt was on torsemide 20mg po daily at home, holding home spironolactone   medication noncompliance     - clinically improving   - Appreciate HF Consult:   - Continue Bumex gtt at 1 mg/hr  Continue 3% Hypertonic Saline 150ml BID (If infused throughout a central line, run over one hour, if a peripheral line is to be used run over 3 hours)  - Entresto restarted 24-26 (1/30) BID  - metoprolol succinate to 50mg daily (from 100mg daily-1/30)   -Iron deficient (ferritin 77)- continue IV iron sucrose as 200 mg iv daily for 5 days  -Get BMP twice daily with magnesium    -Maintain potassium >4.0, Mg >2.2  -Strict intake and output  -Daily weight   - EP consult appreciated: plan for ICD per Dr Soto 2/13, continue with telemetry monitoring for now. Patient has life vest at home     Severe bilateral PAD  CAD s/p PCI / HTN   History of CVA  - Arterial duplex:  a) No flow is visualized in distal right SFA and proximal and mid left SFA.  b) Reconstitution in the popliteal artery bilaterally with diminished flow in tibial arteries.  -vascular cardiology on board, recommend cross sectional imaging once renal fxn improves  - c/w statin and plavix     AICD Site Infection PRIOR h/x   - now removed, no erythema or drainage around site   - CT chest reviewed not suggestive of collection in lt chest/breast area  - Blood cx :Staph haemolyticus in one bottle Likely contamination.    -Repeat blood cxs NGTD  - planned for  ICD placement with EP 2/13    hx of chronic hypoxic resp failure on 4 liter oxygen  due to COPD - stable   CORNELIUS  - Cont inhalers, CPAP     JEMIMA 2/2 HFrEF Exacerbation/diuresis   - baseline 1.3-1.4  - c/w diuresis per HF recs   - f/u Cr daily if worsens get Nephro consult f/u all electrolytes  - I/O's monitoring      DM-II   Suspected diabetic neuropathy  - hold home farxiga and metformin   - fingersticks and insulin   - started on gabapentin     Chronic Normocytic Anemia      stable, Venofer for 5 days per HF     Shoulder pain - likely from immobility - not responsive to oxycodone  - Patient reports itching from morphine  - trial oxycodone 10   . Ensure bowel regimen and PT      Pending: diuresis, ICD with ep and vascular cardiology f/u, pain control, diuresis, labs

## 2023-02-01 NOTE — PROGRESS NOTE ADULT - NS ATTEND AMEND GEN_ALL_CORE FT
56 YO F with a history of ACC/AHA Stage C ICM(LV 4.9 cm, LVEF 25-30%), CAD s/p PCI, COPD on O2, morbid obesity, pulmonary hypertension (likely WHO II/III), and CKD III (Cr 1.6) who was admitted with decompensated heart failure in setting of poor dietary discretion. She is demonstrating pain seeking behavior in the hospital    She remains volume overloaded on exam but improving, continue bumex infusion. Her renal function is grossly stable.     For GDMT, will continue entresto 24-26 mg BID and metoprolol succinate to 50 mg daily. eventual spironolactone and SGLT2i.
56 YO F with a history of ACC/AHA Stage C ICM(LV 4.9 cm, LVEF 25-30%), CAD s/p PCI, COPD on O2, morbid obesity, pulmonary hypertension (likely WHO II/III), and CKD III (Cr 1.6) who was admitted with decompensated heart failure in setting of poor dietary discretion. She is demonstrating pain seeking behavior in the hospital    She is diuresing well and nearing euvolemia, continue bumex infusion and will likely switch to PO tomorrow. Her renal function is grossly stable.     For GDMT, will continue entresto 24-26 mg BID and metoprolol succinate to 50 mg daily. eventual spironolactone and SGLT2i.
58 YO F with a history of ACC/AHA Stage C ICM(LV 4.9 cm, LVEF 25-30%), CAD s/p PCI, COPD on O2, morbid obesity, pulmonary hypertension (likely WHO II/III), and CKD III (Cr 1.6) who was admitted with decompensated heart failure in setting of poor dietary discretion. She is demonstrating pain seeking behavior in the hospital    She remains volume overloaded on exam, continue bumex infusion.    For GDMT, will resume entresto 24-26 mg BID and decrease metoprolol succinate to 50 mg daily. eventual spironolactone.

## 2023-02-01 NOTE — PROGRESS NOTE ADULT - SUBJECTIVE AND OBJECTIVE BOX
WILLIAN MARY  57y  Female      Patient is a 57y old  Female who presents with a chief complaint of SOB (31 Jan 2023 16:01)      INTERVAL HPI/OVERNIGHT EVENTS:  Still with leg pain  Vital Signs Last 24 Hrs  T(C): 36.3 (01 Feb 2023 13:19), Max: 36.7 (31 Jan 2023 20:35)  T(F): 97.3 (01 Feb 2023 13:19), Max: 98 (31 Jan 2023 20:35)  HR: 75 (01 Feb 2023 13:19) (75 - 86)  BP: 127/60 (01 Feb 2023 13:19) (99/57 - 127/60)  BP(mean): --  RR: 18 (01 Feb 2023 13:19) (18 - 18)  SpO2: --    Parameters below as of 31 Jan 2023 23:54  Patient On (Oxygen Delivery Method): nasal cannula          01-31-23 @ 07:01 - 02-01-23 @ 07:00  --------------------------------------------------------  IN: 240 mL / OUT: 3351 mL / NET: -3111 mL    02-01-23 @ 07:01  - 02-01-23 @ 18:27  --------------------------------------------------------  IN: 586 mL / OUT: 2003 mL / NET: -1417 mL            Consultant(s) Notes Reviewed:  [x ] YES  [ ] NO          MEDICATIONS  (STANDING):  atorvastatin 80 milliGRAM(s) Oral at bedtime  budesonide 160 MICROgram(s)/formoterol 4.5 MICROgram(s) Inhaler 2 Puff(s) Inhalation two times a day  buMETAnide Infusion 1 mG/Hr (5 mL/Hr) IV Continuous <Continuous>  clopidogrel Tablet 75 milliGRAM(s) Oral daily  dextrose 5%. 1000 milliLiter(s) (100 mL/Hr) IV Continuous <Continuous>  dextrose 5%. 1000 milliLiter(s) (50 mL/Hr) IV Continuous <Continuous>  dextrose 50% Injectable 25 Gram(s) IV Push once  dextrose 50% Injectable 12.5 Gram(s) IV Push once  dextrose 50% Injectable 25 Gram(s) IV Push once  enoxaparin Injectable 40 milliGRAM(s) SubCutaneous every 24 hours  fenofibrate Tablet 145 milliGRAM(s) Oral daily  gabapentin 100 milliGRAM(s) Oral two times a day  glucagon  Injectable 1 milliGRAM(s) IntraMuscular once  insulin lispro (ADMELOG) corrective regimen sliding scale   SubCutaneous three times a day before meals  iron sucrose IVPB 200 milliGRAM(s) IV Intermittent every 24 hours  levothyroxine 25 MICROGram(s) Oral daily  magnesium oxide 400 milliGRAM(s) Oral three times a day with meals  metoprolol succinate ER 50 milliGRAM(s) Oral daily  pantoprazole    Tablet 40 milliGRAM(s) Oral before breakfast  polyethylene glycol 3350 17 Gram(s) Oral two times a day  sacubitril 24 mG/valsartan 26 mG 1 Tablet(s) Oral two times a day  senna 2 Tablet(s) Oral at bedtime  sodium chloride 3%. 150 milliLiter(s) (50 mL/Hr) IV Continuous <Continuous>    MEDICATIONS  (PRN):  dextrose Oral Gel 15 Gram(s) Oral once PRN Blood Glucose LESS THAN 70 milliGRAM(s)/deciliter  melatonin 5 milliGRAM(s) Oral at bedtime PRN Sleep  morphine  - Injectable 4 milliGRAM(s) IV Push every 4 hours PRN Severe Pain (7 - 10)  oxyCODONE    IR 10 milliGRAM(s) Oral every 6 hours PRN Moderate Pain (4 - 6)      LABS                          9.4    4.47  )-----------( 159      ( 01 Feb 2023 07:37 )             28.9     02-01    141  |  95<L>  |  51<H>  ----------------------------<  147<H>  4.0   |  34<H>  |  1.8<H>    Ca    9.2      01 Feb 2023 07:37  Mg     2.0     02-01    TPro  6.6  /  Alb  3.5  /  TBili  1.1  /  DBili  x   /  AST  16  /  ALT  <5  /  AlkPhos  68  02-01          Lactate Trend        CAPILLARY BLOOD GLUCOSE      POCT Blood Glucose.: 145 mg/dL (01 Feb 2023 16:51)        RADIOLOGY & ADDITIONAL TESTS:    Imaging Personally Reviewed:  [ ] YES  [ ] NO    HEALTH ISSUES - PROBLEM Dx:          PHYSICAL EXAM:  GENERAL: NAD, well-developed.  HEAD:  Atraumatic, Normocephalic.  EYES: EOMI, PERRLA, conjunctiva and sclera clear.  NECK: Supple, No JVD.  CHEST/LUNG: Clear to auscultation bilaterally; No wheeze.  HEART: Regular rate and rhythm; S1 S2.   ABDOMEN: Soft, Nontender, Nondistended; Bowel sounds present.  EXTREMITIES:  2+ Peripheral Pulses, leg edema 1+  PSYCH: AAOx3.  NEUROLOGY:   SKIN: No rashes or lesions.

## 2023-02-02 LAB
ALBUMIN SERPL ELPH-MCNC: 3.6 G/DL — SIGNIFICANT CHANGE UP (ref 3.5–5.2)
ALP SERPL-CCNC: 67 U/L — SIGNIFICANT CHANGE UP (ref 30–115)
ALT FLD-CCNC: <5 U/L — SIGNIFICANT CHANGE UP (ref 0–41)
ANION GAP SERPL CALC-SCNC: 11 MMOL/L — SIGNIFICANT CHANGE UP (ref 7–14)
ANION GAP SERPL CALC-SCNC: 12 MMOL/L — SIGNIFICANT CHANGE UP (ref 7–14)
AST SERPL-CCNC: 17 U/L — SIGNIFICANT CHANGE UP (ref 0–41)
BASOPHILS # BLD AUTO: 0.09 K/UL — SIGNIFICANT CHANGE UP (ref 0–0.2)
BASOPHILS NFR BLD AUTO: 1.7 % — HIGH (ref 0–1)
BILIRUB SERPL-MCNC: 1.3 MG/DL — HIGH (ref 0.2–1.2)
BUN SERPL-MCNC: 52 MG/DL — HIGH (ref 10–20)
BUN SERPL-MCNC: 53 MG/DL — HIGH (ref 10–20)
CALCIUM SERPL-MCNC: 9.5 MG/DL — SIGNIFICANT CHANGE UP (ref 8.4–10.5)
CALCIUM SERPL-MCNC: 9.7 MG/DL — SIGNIFICANT CHANGE UP (ref 8.4–10.5)
CHLORIDE SERPL-SCNC: 93 MMOL/L — LOW (ref 98–110)
CHLORIDE SERPL-SCNC: 94 MMOL/L — LOW (ref 98–110)
CO2 SERPL-SCNC: 35 MMOL/L — HIGH (ref 17–32)
CO2 SERPL-SCNC: 36 MMOL/L — HIGH (ref 17–32)
CREAT SERPL-MCNC: 1.7 MG/DL — HIGH (ref 0.7–1.5)
CREAT SERPL-MCNC: 1.9 MG/DL — HIGH (ref 0.7–1.5)
EGFR: 31 ML/MIN/1.73M2 — LOW
EGFR: 35 ML/MIN/1.73M2 — LOW
EOSINOPHIL # BLD AUTO: 0.22 K/UL — SIGNIFICANT CHANGE UP (ref 0–0.7)
EOSINOPHIL NFR BLD AUTO: 4.2 % — SIGNIFICANT CHANGE UP (ref 0–8)
GLUCOSE BLDC GLUCOMTR-MCNC: 121 MG/DL — HIGH (ref 70–99)
GLUCOSE BLDC GLUCOMTR-MCNC: 138 MG/DL — HIGH (ref 70–99)
GLUCOSE BLDC GLUCOMTR-MCNC: 149 MG/DL — HIGH (ref 70–99)
GLUCOSE BLDC GLUCOMTR-MCNC: 161 MG/DL — HIGH (ref 70–99)
GLUCOSE SERPL-MCNC: 108 MG/DL — HIGH (ref 70–99)
GLUCOSE SERPL-MCNC: 173 MG/DL — HIGH (ref 70–99)
HCT VFR BLD CALC: 30.5 % — LOW (ref 37–47)
HGB BLD-MCNC: 9.9 G/DL — LOW (ref 12–16)
IMM GRANULOCYTES NFR BLD AUTO: 0.4 % — HIGH (ref 0.1–0.3)
LYMPHOCYTES # BLD AUTO: 0.86 K/UL — LOW (ref 1.2–3.4)
LYMPHOCYTES # BLD AUTO: 16.5 % — LOW (ref 20.5–51.1)
MAGNESIUM SERPL-MCNC: 1.8 MG/DL — SIGNIFICANT CHANGE UP (ref 1.8–2.4)
MAGNESIUM SERPL-MCNC: 1.9 MG/DL — SIGNIFICANT CHANGE UP (ref 1.8–2.4)
MAGNESIUM SERPL-MCNC: 2 MG/DL — SIGNIFICANT CHANGE UP (ref 1.8–2.4)
MCHC RBC-ENTMCNC: 28.1 PG — SIGNIFICANT CHANGE UP (ref 27–31)
MCHC RBC-ENTMCNC: 32.5 G/DL — SIGNIFICANT CHANGE UP (ref 32–37)
MCV RBC AUTO: 86.6 FL — SIGNIFICANT CHANGE UP (ref 81–99)
MONOCYTES # BLD AUTO: 0.68 K/UL — HIGH (ref 0.1–0.6)
MONOCYTES NFR BLD AUTO: 13.1 % — HIGH (ref 1.7–9.3)
NEUTROPHILS # BLD AUTO: 3.34 K/UL — SIGNIFICANT CHANGE UP (ref 1.4–6.5)
NEUTROPHILS NFR BLD AUTO: 64.1 % — SIGNIFICANT CHANGE UP (ref 42.2–75.2)
NRBC # BLD: 0 /100 WBCS — SIGNIFICANT CHANGE UP (ref 0–0)
PLATELET # BLD AUTO: 188 K/UL — SIGNIFICANT CHANGE UP (ref 130–400)
POTASSIUM SERPL-MCNC: 3.8 MMOL/L — SIGNIFICANT CHANGE UP (ref 3.5–5)
POTASSIUM SERPL-MCNC: 4.1 MMOL/L — SIGNIFICANT CHANGE UP (ref 3.5–5)
POTASSIUM SERPL-SCNC: 3.8 MMOL/L — SIGNIFICANT CHANGE UP (ref 3.5–5)
POTASSIUM SERPL-SCNC: 4.1 MMOL/L — SIGNIFICANT CHANGE UP (ref 3.5–5)
PROT SERPL-MCNC: 7.1 G/DL — SIGNIFICANT CHANGE UP (ref 6–8)
RBC # BLD: 3.52 M/UL — LOW (ref 4.2–5.4)
RBC # FLD: 18.6 % — HIGH (ref 11.5–14.5)
SODIUM SERPL-SCNC: 140 MMOL/L — SIGNIFICANT CHANGE UP (ref 135–146)
SODIUM SERPL-SCNC: 141 MMOL/L — SIGNIFICANT CHANGE UP (ref 135–146)
WBC # BLD: 5.21 K/UL — SIGNIFICANT CHANGE UP (ref 4.8–10.8)
WBC # FLD AUTO: 5.21 K/UL — SIGNIFICANT CHANGE UP (ref 4.8–10.8)

## 2023-02-02 PROCEDURE — 99233 SBSQ HOSP IP/OBS HIGH 50: CPT

## 2023-02-02 PROCEDURE — 99232 SBSQ HOSP IP/OBS MODERATE 35: CPT

## 2023-02-02 RX ORDER — DAPAGLIFLOZIN 10 MG/1
10 TABLET, FILM COATED ORAL EVERY 24 HOURS
Refills: 0 | Status: DISCONTINUED | OUTPATIENT
Start: 2023-02-02 | End: 2023-02-04

## 2023-02-02 RX ORDER — METOPROLOL TARTRATE 50 MG
1 TABLET ORAL
Qty: 30 | Refills: 1
Start: 2023-02-02 | End: 2023-04-02

## 2023-02-02 RX ORDER — METOPROLOL TARTRATE 50 MG
1 TABLET ORAL
Qty: 0 | Refills: 0 | DISCHARGE

## 2023-02-02 RX ORDER — MAGNESIUM SULFATE 500 MG/ML
2 VIAL (ML) INJECTION ONCE
Refills: 0 | Status: COMPLETED | OUTPATIENT
Start: 2023-02-02 | End: 2023-02-02

## 2023-02-02 RX ORDER — SACUBITRIL AND VALSARTAN 24; 26 MG/1; MG/1
1 TABLET, FILM COATED ORAL
Qty: 60 | Refills: 1
Start: 2023-02-02 | End: 2023-04-02

## 2023-02-02 RX ORDER — ATORVASTATIN CALCIUM 80 MG/1
1 TABLET, FILM COATED ORAL
Qty: 30 | Refills: 1
Start: 2023-02-02 | End: 2023-04-02

## 2023-02-02 RX ADMIN — MAGNESIUM OXIDE 400 MG ORAL TABLET 400 MILLIGRAM(S): 241.3 TABLET ORAL at 07:58

## 2023-02-02 RX ADMIN — OXYCODONE HYDROCHLORIDE 10 MILLIGRAM(S): 5 TABLET ORAL at 21:45

## 2023-02-02 RX ADMIN — IRON SUCROSE 110 MILLIGRAM(S): 20 INJECTION, SOLUTION INTRAVENOUS at 12:23

## 2023-02-02 RX ADMIN — MORPHINE SULFATE 4 MILLIGRAM(S): 50 CAPSULE, EXTENDED RELEASE ORAL at 05:56

## 2023-02-02 RX ADMIN — SODIUM CHLORIDE 50 MILLILITER(S): 5 INJECTION, SOLUTION INTRAVENOUS at 06:06

## 2023-02-02 RX ADMIN — Medication 25 MICROGRAM(S): at 05:52

## 2023-02-02 RX ADMIN — SACUBITRIL AND VALSARTAN 1 TABLET(S): 24; 26 TABLET, FILM COATED ORAL at 17:58

## 2023-02-02 RX ADMIN — Medication 50 MILLIGRAM(S): at 05:52

## 2023-02-02 RX ADMIN — PANTOPRAZOLE SODIUM 40 MILLIGRAM(S): 20 TABLET, DELAYED RELEASE ORAL at 06:01

## 2023-02-02 RX ADMIN — MORPHINE SULFATE 4 MILLIGRAM(S): 50 CAPSULE, EXTENDED RELEASE ORAL at 10:10

## 2023-02-02 RX ADMIN — Medication 25 GRAM(S): at 12:24

## 2023-02-02 RX ADMIN — MORPHINE SULFATE 4 MILLIGRAM(S): 50 CAPSULE, EXTENDED RELEASE ORAL at 06:26

## 2023-02-02 RX ADMIN — SENNA PLUS 2 TABLET(S): 8.6 TABLET ORAL at 22:21

## 2023-02-02 RX ADMIN — GABAPENTIN 100 MILLIGRAM(S): 400 CAPSULE ORAL at 05:52

## 2023-02-02 RX ADMIN — MORPHINE SULFATE 4 MILLIGRAM(S): 50 CAPSULE, EXTENDED RELEASE ORAL at 10:40

## 2023-02-02 RX ADMIN — OXYCODONE HYDROCHLORIDE 10 MILLIGRAM(S): 5 TABLET ORAL at 15:39

## 2023-02-02 RX ADMIN — MAGNESIUM OXIDE 400 MG ORAL TABLET 400 MILLIGRAM(S): 241.3 TABLET ORAL at 17:58

## 2023-02-02 RX ADMIN — OXYCODONE HYDROCHLORIDE 10 MILLIGRAM(S): 5 TABLET ORAL at 16:09

## 2023-02-02 RX ADMIN — Medication 145 MILLIGRAM(S): at 12:24

## 2023-02-02 RX ADMIN — MORPHINE SULFATE 4 MILLIGRAM(S): 50 CAPSULE, EXTENDED RELEASE ORAL at 18:00

## 2023-02-02 RX ADMIN — MAGNESIUM OXIDE 400 MG ORAL TABLET 400 MILLIGRAM(S): 241.3 TABLET ORAL at 12:24

## 2023-02-02 RX ADMIN — OXYCODONE HYDROCHLORIDE 10 MILLIGRAM(S): 5 TABLET ORAL at 02:31

## 2023-02-02 RX ADMIN — SACUBITRIL AND VALSARTAN 1 TABLET(S): 24; 26 TABLET, FILM COATED ORAL at 05:52

## 2023-02-02 RX ADMIN — ATORVASTATIN CALCIUM 80 MILLIGRAM(S): 80 TABLET, FILM COATED ORAL at 22:20

## 2023-02-02 RX ADMIN — CLOPIDOGREL BISULFATE 75 MILLIGRAM(S): 75 TABLET, FILM COATED ORAL at 12:23

## 2023-02-02 RX ADMIN — BUDESONIDE AND FORMOTEROL FUMARATE DIHYDRATE 2 PUFF(S): 160; 4.5 AEROSOL RESPIRATORY (INHALATION) at 07:59

## 2023-02-02 RX ADMIN — MORPHINE SULFATE 4 MILLIGRAM(S): 50 CAPSULE, EXTENDED RELEASE ORAL at 18:34

## 2023-02-02 RX ADMIN — GABAPENTIN 100 MILLIGRAM(S): 400 CAPSULE ORAL at 17:58

## 2023-02-02 RX ADMIN — OXYCODONE HYDROCHLORIDE 10 MILLIGRAM(S): 5 TABLET ORAL at 02:01

## 2023-02-02 RX ADMIN — ENOXAPARIN SODIUM 40 MILLIGRAM(S): 100 INJECTION SUBCUTANEOUS at 05:52

## 2023-02-02 NOTE — PROGRESS NOTE ADULT - SUBJECTIVE AND OBJECTIVE BOX
Subjective:  No overnight events  Denies edema or dyspnea, feels at baseline    Medications:  atorvastatin 80 milliGRAM(s) Oral at bedtime  budesonide 160 MICROgram(s)/formoterol 4.5 MICROgram(s) Inhaler 2 Puff(s) Inhalation two times a day  buMETAnide Infusion 1 mG/Hr IV Continuous <Continuous>  clopidogrel Tablet 75 milliGRAM(s) Oral daily  dextrose 5%. 1000 milliLiter(s) IV Continuous <Continuous>  dextrose 5%. 1000 milliLiter(s) IV Continuous <Continuous>  dextrose 50% Injectable 25 Gram(s) IV Push once  dextrose 50% Injectable 12.5 Gram(s) IV Push once  dextrose 50% Injectable 25 Gram(s) IV Push once  dextrose Oral Gel 15 Gram(s) Oral once PRN  enoxaparin Injectable 40 milliGRAM(s) SubCutaneous every 24 hours  fenofibrate Tablet 145 milliGRAM(s) Oral daily  gabapentin 100 milliGRAM(s) Oral two times a day  glucagon  Injectable 1 milliGRAM(s) IntraMuscular once  insulin lispro (ADMELOG) corrective regimen sliding scale   SubCutaneous three times a day before meals  levothyroxine 25 MICROGram(s) Oral daily  magnesium oxide 400 milliGRAM(s) Oral three times a day with meals  melatonin 5 milliGRAM(s) Oral at bedtime PRN  metoprolol succinate ER 50 milliGRAM(s) Oral daily  morphine  - Injectable 4 milliGRAM(s) IV Push every 4 hours PRN  oxyCODONE    IR 10 milliGRAM(s) Oral every 6 hours PRN  pantoprazole    Tablet 40 milliGRAM(s) Oral before breakfast  polyethylene glycol 3350 17 Gram(s) Oral two times a day  sacubitril 24 mG/valsartan 26 mG 1 Tablet(s) Oral two times a day  senna 2 Tablet(s) Oral at bedtime  sodium chloride 3%. 150 milliLiter(s) IV Continuous <Continuous>  sodium chloride 3%. 150 milliLiter(s) IV Continuous <Continuous>  sodium chloride 3%. 150 milliLiter(s) IV Continuous <Continuous>    Vitals:  T(C): 36.4 (02-02-23 @ 05:28), Max: 36.6 (02-02-23 @ 00:09)  HR: 85 (02-02-23 @ 09:30) (71 - 90)  BP: 124/74 (02-02-23 @ 09:30) (104/58 - 127/60)  BP(mean): --  RR: 18 (02-02-23 @ 09:30) (18 - 19)  SpO2: 96% (02-02-23 @ 05:28) (93% - 96%)    Daily     Daily         I&O's Summary    01 Feb 2023 07:01  -  02 Feb 2023 07:00  --------------------------------------------------------  IN: 1241 mL / OUT: 4603 mL / NET: -3362 mL    02 Feb 2023 07:01  -  02 Feb 2023 12:42  --------------------------------------------------------  IN: 286 mL / OUT: 1700 mL / NET: -1414 mL        Physical Exam:  Appearance: No Acute Distress  HEENT: JVP non elevated  Cardiovascular: RRR, Normal S1 S2, No murmurs/rubs/gallops  Respiratory: Clear to auscultation bilaterally  Gastrointestinal: Soft, Non-tender, non-distended	  Skin: no skin lesions  Neurologic: Non-focal  Extremities: Trace LE edema, warm and well perfused, + PAD changes   Psychiatry: A & O x 3, Mood & affect appropriate      Labs:                        9.9    5.21  )-----------( 188      ( 02 Feb 2023 05:35 )             30.5     02-02    140  |  93<L>  |  53<H>  ----------------------------<  108<H>  4.1   |  35<H>  |  1.9<H>    Ca    9.5      02 Feb 2023 05:35  Mg     1.9     02-02    TPro  7.1  /  Alb  3.6  /  TBili  1.3<H>  /  DBili  x   /  AST  17  /  ALT  <5  /  AlkPhos  67  02-02

## 2023-02-02 NOTE — PROGRESS NOTE ADULT - ASSESSMENT
58 yo F PMHx  COPD on 4 L home O2, CORNELIUS on CPAP, chronic HFrEF (19% on TTE 02/2022, s/p ICD, pulmonary HTN, CAD s/p PCI to RCA)1, HTN, HLD, CVA with residual LLE weakness, DM, recent ICD infection s/o removal, active smoker on home O2 4L NC, presented to the ED for foot pain.     Acute on chronic HFrEF:   Severe Pulmonary HTN:   Severe Tricuspid Regurgitation:  SOB and Leg edema, Pro-BNP 26K,  much higher than prior  CXR showed no acute infiltrates.   Echo 12/22 showed LVEF 25-30%, mildly enlarged RV with RV volume overload, severe TR, mild MR< severe pulmonary HTN  s/p Bumex drip and hypertonic saline. Switch to Torsemide 40mg PO BID.   Continue Metoprolol and Entresto. Resume Farxiga.   Heart failure follow up.   History of AICD infection s/p removal, EP plan for ICD per Dr Soto on 2/13, continue with telemetry monitoring for now  Continue Venofer infusion, ends 2/3/23    Severe bilateral PAD  Arterial duplex:No flow is visualized in distal right SFA and proximal and mid left SFA.  Vascular cardiology follow up for possible angiogram.   Continue Plavix and Lipitor.     JEMIMA on CKD stage 3: likely cardiorenal syndrome.   Cr baseline 1.4, currently 1.9  Continue with diuresis,    CAD s/p PCI  Stable, no chest pain.   Continue Plavix, metoprolol and Lipitor.     Chronic hypoxemic respiratory failure 2/2 COPD on 4 L at baseline   CORNELIUS on CPAP   CPAP ordered based on prior setting, PEEP 8 and FiO2 40%  Continue Symbicort and albuterol prn, continue nasal canula.     History of CVA   Residual left side weakness  Continue ASA and Lipitor.      Hypothyroidism: TSH 4.7, FT4 1.3 normal, Continue Levothyroxine.     Chronic Normocytic Anemia - stable      Overall prognosis poor given chronic medical conditions    Pending: arterial occlusion work up, diuresis  Discussed with patient .

## 2023-02-02 NOTE — CHART NOTE - NSCHARTNOTEFT_GEN_A_CORE
Brief Chart Note    I saw and examined the patient on 2/1/23.     Her lower extremity pain is much improved after aggressive diuresis. We discussed her options for the management of PAD, including continued medical management and further workup (such as ELIANA/PVR and cross-sectional imaging to determine revascularization strategy). She does not want any further testing and is not interested in revascularization at this time.     She has no tissue breakdown.    No further workup at this time. Recommend that she follow-up with me in the office in 2-4 weeks for close monitoring. If the pain worsens or if she develops critical limb ischemia, will readdress testing and revascularization.  Continue medical management with Plavix and atorvastatin.     Arpita Valentine MD  Vascular Cardiology Attending  Please text or call via MS Teams with questions

## 2023-02-02 NOTE — PROGRESS NOTE ADULT - ASSESSMENT
57 year old female with PMH of COPD on 4 L home O2, OCRNELIUS on CPAP, HFrEF (19% on TTE 02/2022, s/p ICD, pulmonary HTN, CAD s/p PCI to RCA)1, HTN, HLD, CVA with residual LLE weakness, DM, active smoker on home O2 4L NC, presented to the ED for foot pain.  Patient does not know any of her meds or doses    SOB associated with  severe lower extremity edema due to acute on chronic HFrEF (19%)  Fluid Overload * pt was on torsemide 20mg po daily at home, holding home spironolactone   medication noncompliance     - clinically improving   - Appreciate HF Consult:   - If continued improvement will possibly switch to oral diuretics tomorrow  - Entresto restarted 24-26 (1/30) BID  - metoprolol succinate to 50mg daily (from 100mg daily-1/30)   -Iron deficient (ferritin 77)- continue IV iron sucrose as 200 mg iv daily for 5 days  -Get BMP twice daily with magnesium    -Maintain potassium >4.0, Mg >2.2  -Strict intake and output  -Daily weight   -may consider to switch to oral medications 2/2/23 if diuresing adequately  - EP consult appreciated: plan for ICD per Dr Soto.     Severe bilateral PAD  CAD s/p PCI / HTN   History of CVA  - Arterial duplex:  a) No flow is visualized in distal right SFA and proximal and mid left SFA.  b) Reconstitution in the popliteal artery bilaterally with diminished flow in tibial arteries.  -vascular cardiology on board, per note- not interested in further imaging or intervention.   No further workup at this time. Recommend that she follow-up in the office in 2-4 weeks for close monitoring. If the pain worsens or if she develops critical limb ischemia, will readdress testing and revascularization.    - c/w statin and plavix     AICD Site Infection PRIOR h/x   - now removed, no erythema or drainage around site   - CT chest reviewed not suggestive of collection in lt chest/breast area  - Blood cx :Staph haemolyticus in one bottle Likely contamination.    -Repeat blood cxs (1/25) NGTD,   - planned for  ICD placement with EP 2/13    hx of chronic hypoxic resp failure on 4 liter oxygen  due to COPD - stable   CORNELIUS  - Cont inhalers, CPAP     JEMIMA 2/2 HFrEF Exacerbation/diuresis   - baseline 1.3-1.4  - c/w diuresis per HF recs   - f/u Cr daily if worsens get Nephro consult f/u all electrolytes  - I/O's monitoring      DM-II   Suspected diabetic neuropathy  - hold home farxiga and metformin   - fingersticks and insulin   - started on gabapentin- for suspected diabetic neuropathy, suspect that leg pain is more for arterial occlusion    Chronic Normocytic Anemia    stable, Venofer for 5 days per HF     Shoulder pain - likely from immobility - not responsive to oxycodone  - Patient reports itching from morphine  - trial oxycodone 10   . Ensure bowel regimen and PT

## 2023-02-02 NOTE — PROGRESS NOTE ADULT - ASSESSMENT
58 YO F with a history of ACC/AHA Stage C ICM(LV 4.9 cm, LVEF 25-30%), CAD s/p PCI, COPD on O2, morbid obesity, pulmonary hypertension (likely WHO II/III), and CKD III (Cr 1.6) who was admitted with decompensated heart failure in setting of poor dietary discretion. She is demonstrating pain seeking behavior in the hospital    She now appears euvolemic    # Acute on chronic systolic heart failure  - GDMT: continue entresto 24-26 mg BID and metoprolol succinate 50 mg daily. resume Farxiga 10 mg daily. defer MRA given borderline eGFR  - Diuretics: switch bumex infusion to torsemide 40 mg BID    # CKD  - Cr stable at 1.7-1.9  - Decreasing diuretics and resuming SGLT2i as above    No contraindications to discharge from HF perspective, she will follow with Dr. Bryant

## 2023-02-02 NOTE — PROGRESS NOTE ADULT - SUBJECTIVE AND OBJECTIVE BOX
OVERNIGHT EVENTS: TANIA O/N denies CP, SOB and other ROS.    SUBJECTIVE / INTERVAL HPI: Patient seen and examined at bedside.     VITAL SIGNS:  Vital Signs Last 24 Hrs  T(C): 36.4 (02 Feb 2023 05:28), Max: 36.6 (02 Feb 2023 00:09)  T(F): 97.5 (02 Feb 2023 05:28), Max: 97.9 (02 Feb 2023 00:09)  HR: 85 (02 Feb 2023 09:30) (71 - 90)  BP: 124/74 (02 Feb 2023 09:30) (104/58 - 127/60)  BP(mean): --  RR: 18 (02 Feb 2023 09:30) (18 - 19)  SpO2: 96% (02 Feb 2023 05:28) (93% - 96%)    Parameters below as of 01 Feb 2023 21:29  Patient On (Oxygen Delivery Method): nasal cannula  O2 Flow (L/min): 3      PHYSICAL EXAM:    GENERAL: NAD, well-developed.  HEAD:  Atraumatic, Normocephalic.  EYES: EOMI, PERRLA, conjunctiva and sclera clear.  NECK: Supple, No JVD.  CHEST/LUNG: Clear to auscultation bilaterally; No wheeze.  HEART: Regular rate and rhythm; S1 S2.   ABDOMEN: Soft, Nontender, abdomen size unchanged; Bowel sounds present.  EXTREMITIES:  2+ Peripheral Pulses, leg edema 1+  PSYCH: AAOx3.  SKIN: No rashes or lesions.    MEDICATIONS:  MEDICATIONS  (STANDING):  atorvastatin 80 milliGRAM(s) Oral at bedtime  budesonide 160 MICROgram(s)/formoterol 4.5 MICROgram(s) Inhaler 2 Puff(s) Inhalation two times a day  buMETAnide Infusion 1 mG/Hr (5 mL/Hr) IV Continuous <Continuous>  clopidogrel Tablet 75 milliGRAM(s) Oral daily  dextrose 5%. 1000 milliLiter(s) (100 mL/Hr) IV Continuous <Continuous>  dextrose 5%. 1000 milliLiter(s) (50 mL/Hr) IV Continuous <Continuous>  dextrose 50% Injectable 25 Gram(s) IV Push once  dextrose 50% Injectable 12.5 Gram(s) IV Push once  dextrose 50% Injectable 25 Gram(s) IV Push once  enoxaparin Injectable 40 milliGRAM(s) SubCutaneous every 24 hours  fenofibrate Tablet 145 milliGRAM(s) Oral daily  gabapentin 100 milliGRAM(s) Oral two times a day  glucagon  Injectable 1 milliGRAM(s) IntraMuscular once  insulin lispro (ADMELOG) corrective regimen sliding scale   SubCutaneous three times a day before meals  iron sucrose IVPB 200 milliGRAM(s) IV Intermittent every 24 hours  levothyroxine 25 MICROGram(s) Oral daily  magnesium oxide 400 milliGRAM(s) Oral three times a day with meals  metoprolol succinate ER 50 milliGRAM(s) Oral daily  pantoprazole    Tablet 40 milliGRAM(s) Oral before breakfast  polyethylene glycol 3350 17 Gram(s) Oral two times a day  sacubitril 24 mG/valsartan 26 mG 1 Tablet(s) Oral two times a day  senna 2 Tablet(s) Oral at bedtime  sodium chloride 3%. 150 milliLiter(s) (50 mL/Hr) IV Continuous <Continuous>  sodium chloride 3%. 150 milliLiter(s) (50 mL/Hr) IV Continuous <Continuous>  sodium chloride 3%. 150 milliLiter(s) (50 mL/Hr) IV Continuous <Continuous>    MEDICATIONS  (PRN):  dextrose Oral Gel 15 Gram(s) Oral once PRN Blood Glucose LESS THAN 70 milliGRAM(s)/deciliter  melatonin 5 milliGRAM(s) Oral at bedtime PRN Sleep  morphine  - Injectable 4 milliGRAM(s) IV Push every 4 hours PRN Severe Pain (7 - 10)  oxyCODONE    IR 10 milliGRAM(s) Oral every 6 hours PRN Moderate Pain (4 - 6)      ALLERGIES:  Allergies    No Known Allergies    Intolerances        LABS:                        9.9    5.21  )-----------( 188      ( 02 Feb 2023 05:35 )             30.5     02-02    140  |  93<L>  |  53<H>  ----------------------------<  108<H>  4.1   |  35<H>  |  1.9<H>    Ca    9.5      02 Feb 2023 05:35  Mg     1.9     02-02    TPro  7.1  /  Alb  3.6  /  TBili  1.3<H>  /  DBili  x   /  AST  17  /  ALT  <5  /  AlkPhos  67  02-02        CAPILLARY BLOOD GLUCOSE      POCT Blood Glucose.: 138 mg/dL (02 Feb 2023 07:43)      RADIOLOGY & ADDITIONAL TESTS: Reviewed.    PLAN:

## 2023-02-02 NOTE — PROGRESS NOTE ADULT - SUBJECTIVE AND OBJECTIVE BOX
WILLIAN MARY  57y  Female      Patient is a 57y old  Female who presents with a chief complaint of Heart failure (02 Feb 2023 11:09)      INTERVAL HPI/OVERNIGHT EVENTS:  She feels ok, still with leg pain  Vital Signs Last 24 Hrs  T(C): 36.4 (02 Feb 2023 05:28), Max: 36.6 (02 Feb 2023 00:09)  T(F): 97.5 (02 Feb 2023 05:28), Max: 97.9 (02 Feb 2023 00:09)  HR: 85 (02 Feb 2023 09:30) (71 - 90)  BP: 124/74 (02 Feb 2023 09:30) (104/58 - 124/74)  BP(mean): --  RR: 18 (02 Feb 2023 09:30) (18 - 19)  SpO2: 96% (02 Feb 2023 05:28) (93% - 96%)    Parameters below as of 01 Feb 2023 21:29  Patient On (Oxygen Delivery Method): nasal cannula  O2 Flow (L/min): 3        02-01-23 @ 07:01  -  02-02-23 @ 07:00  --------------------------------------------------------  IN: 1241 mL / OUT: 4603 mL / NET: -3362 mL    02-02-23 @ 07:01  -  02-02-23 @ 14:14  --------------------------------------------------------  IN: 286 mL / OUT: 1700 mL / NET: -1414 mL            Consultant(s) Notes Reviewed:  [x ] YES  [ ] NO          MEDICATIONS  (STANDING):  atorvastatin 80 milliGRAM(s) Oral at bedtime  budesonide 160 MICROgram(s)/formoterol 4.5 MICROgram(s) Inhaler 2 Puff(s) Inhalation two times a day  clopidogrel Tablet 75 milliGRAM(s) Oral daily  dapagliflozin 10 milliGRAM(s) Oral every 24 hours  dextrose 5%. 1000 milliLiter(s) (50 mL/Hr) IV Continuous <Continuous>  dextrose 5%. 1000 milliLiter(s) (100 mL/Hr) IV Continuous <Continuous>  dextrose 50% Injectable 25 Gram(s) IV Push once  dextrose 50% Injectable 12.5 Gram(s) IV Push once  dextrose 50% Injectable 25 Gram(s) IV Push once  enoxaparin Injectable 40 milliGRAM(s) SubCutaneous every 24 hours  fenofibrate Tablet 145 milliGRAM(s) Oral daily  gabapentin 100 milliGRAM(s) Oral two times a day  glucagon  Injectable 1 milliGRAM(s) IntraMuscular once  insulin lispro (ADMELOG) corrective regimen sliding scale   SubCutaneous three times a day before meals  levothyroxine 25 MICROGram(s) Oral daily  magnesium oxide 400 milliGRAM(s) Oral three times a day with meals  metoprolol succinate ER 50 milliGRAM(s) Oral daily  pantoprazole    Tablet 40 milliGRAM(s) Oral before breakfast  polyethylene glycol 3350 17 Gram(s) Oral two times a day  sacubitril 24 mG/valsartan 26 mG 1 Tablet(s) Oral two times a day  senna 2 Tablet(s) Oral at bedtime  sodium chloride 3%. 150 milliLiter(s) (50 mL/Hr) IV Continuous <Continuous>  sodium chloride 3%. 150 milliLiter(s) (50 mL/Hr) IV Continuous <Continuous>  sodium chloride 3%. 150 milliLiter(s) (50 mL/Hr) IV Continuous <Continuous>    MEDICATIONS  (PRN):  dextrose Oral Gel 15 Gram(s) Oral once PRN Blood Glucose LESS THAN 70 milliGRAM(s)/deciliter  melatonin 5 milliGRAM(s) Oral at bedtime PRN Sleep  morphine  - Injectable 4 milliGRAM(s) IV Push every 4 hours PRN Severe Pain (7 - 10)  oxyCODONE    IR 10 milliGRAM(s) Oral every 6 hours PRN Moderate Pain (4 - 6)      LABS                          9.9    5.21  )-----------( 188      ( 02 Feb 2023 05:35 )             30.5     02-02    140  |  93<L>  |  53<H>  ----------------------------<  108<H>  4.1   |  35<H>  |  1.9<H>    Ca    9.5      02 Feb 2023 05:35  Mg     1.9     02-02    TPro  7.1  /  Alb  3.6  /  TBili  1.3<H>  /  DBili  x   /  AST  17  /  ALT  <5  /  AlkPhos  67  02-02          Lactate Trend        CAPILLARY BLOOD GLUCOSE      POCT Blood Glucose.: 121 mg/dL (02 Feb 2023 12:21)        RADIOLOGY & ADDITIONAL TESTS:    Imaging Personally Reviewed:  [ ] YES  [ ] NO    HEALTH ISSUES - PROBLEM Dx:          PHYSICAL EXAM:  GENERAL: NAD, well-developed.  HEAD:  Atraumatic, Normocephalic.  EYES: EOMI, PERRLA, conjunctiva and sclera clear.  NECK: Supple, No JVD.  CHEST/LUNG: Clear to auscultation bilaterally; No wheeze.  HEART: Regular rate and rhythm; S1 S2.   ABDOMEN: Soft, Nontender, Nondistended; Bowel sounds present.  EXTREMITIES:  2+ Peripheral Pulses, leg edema 1+  PSYCH: AAOx3.  NEUROLOGY:   SKIN: No rashes or lesions.

## 2023-02-03 ENCOUNTER — TRANSCRIPTION ENCOUNTER (OUTPATIENT)
Age: 58
End: 2023-02-03

## 2023-02-03 VITALS
TEMPERATURE: 98 F | RESPIRATION RATE: 18 BRPM | DIASTOLIC BLOOD PRESSURE: 62 MMHG | HEART RATE: 87 BPM | SYSTOLIC BLOOD PRESSURE: 99 MMHG

## 2023-02-03 LAB
ALBUMIN SERPL ELPH-MCNC: 3.6 G/DL — SIGNIFICANT CHANGE UP (ref 3.5–5.2)
ALP SERPL-CCNC: 84 U/L — SIGNIFICANT CHANGE UP (ref 30–115)
ALT FLD-CCNC: <5 U/L — SIGNIFICANT CHANGE UP (ref 0–41)
ANION GAP SERPL CALC-SCNC: 13 MMOL/L — SIGNIFICANT CHANGE UP (ref 7–14)
ANION GAP SERPL CALC-SCNC: 9 MMOL/L — SIGNIFICANT CHANGE UP (ref 7–14)
AST SERPL-CCNC: 18 U/L — SIGNIFICANT CHANGE UP (ref 0–41)
BASOPHILS # BLD AUTO: 0.1 K/UL — SIGNIFICANT CHANGE UP (ref 0–0.2)
BASOPHILS NFR BLD AUTO: 1.9 % — HIGH (ref 0–1)
BILIRUB SERPL-MCNC: 1.2 MG/DL — SIGNIFICANT CHANGE UP (ref 0.2–1.2)
BUN SERPL-MCNC: 50 MG/DL — HIGH (ref 10–20)
BUN SERPL-MCNC: 52 MG/DL — HIGH (ref 10–20)
CALCIUM SERPL-MCNC: 9.4 MG/DL — SIGNIFICANT CHANGE UP (ref 8.4–10.4)
CALCIUM SERPL-MCNC: 9.6 MG/DL — SIGNIFICANT CHANGE UP (ref 8.4–10.5)
CHLORIDE SERPL-SCNC: 92 MMOL/L — LOW (ref 98–110)
CHLORIDE SERPL-SCNC: 94 MMOL/L — LOW (ref 98–110)
CO2 SERPL-SCNC: 34 MMOL/L — HIGH (ref 17–32)
CO2 SERPL-SCNC: 37 MMOL/L — HIGH (ref 17–32)
CREAT SERPL-MCNC: 1.8 MG/DL — HIGH (ref 0.7–1.5)
CREAT SERPL-MCNC: 1.9 MG/DL — HIGH (ref 0.7–1.5)
EGFR: 31 ML/MIN/1.73M2 — LOW
EGFR: 33 ML/MIN/1.73M2 — LOW
EOSINOPHIL # BLD AUTO: 0.27 K/UL — SIGNIFICANT CHANGE UP (ref 0–0.7)
EOSINOPHIL NFR BLD AUTO: 5.2 % — SIGNIFICANT CHANGE UP (ref 0–8)
GLUCOSE BLDC GLUCOMTR-MCNC: 154 MG/DL — HIGH (ref 70–99)
GLUCOSE BLDC GLUCOMTR-MCNC: 162 MG/DL — HIGH (ref 70–99)
GLUCOSE BLDC GLUCOMTR-MCNC: 96 MG/DL — SIGNIFICANT CHANGE UP (ref 70–99)
GLUCOSE SERPL-MCNC: 130 MG/DL — HIGH (ref 70–99)
GLUCOSE SERPL-MCNC: 141 MG/DL — HIGH (ref 70–99)
HCT VFR BLD CALC: 30.8 % — LOW (ref 37–47)
HGB BLD-MCNC: 9.8 G/DL — LOW (ref 12–16)
IMM GRANULOCYTES NFR BLD AUTO: 0.4 % — HIGH (ref 0.1–0.3)
LYMPHOCYTES # BLD AUTO: 1.04 K/UL — LOW (ref 1.2–3.4)
LYMPHOCYTES # BLD AUTO: 20.1 % — LOW (ref 20.5–51.1)
MAGNESIUM SERPL-MCNC: 2.1 MG/DL — SIGNIFICANT CHANGE UP (ref 1.8–2.4)
MAGNESIUM SERPL-MCNC: 2.2 MG/DL — SIGNIFICANT CHANGE UP (ref 1.8–2.4)
MAGNESIUM SERPL-MCNC: 2.3 MG/DL — SIGNIFICANT CHANGE UP (ref 1.8–2.4)
MCHC RBC-ENTMCNC: 27.8 PG — SIGNIFICANT CHANGE UP (ref 27–31)
MCHC RBC-ENTMCNC: 31.8 G/DL — LOW (ref 32–37)
MCV RBC AUTO: 87.3 FL — SIGNIFICANT CHANGE UP (ref 81–99)
MONOCYTES # BLD AUTO: 0.82 K/UL — HIGH (ref 0.1–0.6)
MONOCYTES NFR BLD AUTO: 15.9 % — HIGH (ref 1.7–9.3)
NEUTROPHILS # BLD AUTO: 2.92 K/UL — SIGNIFICANT CHANGE UP (ref 1.4–6.5)
NEUTROPHILS NFR BLD AUTO: 56.5 % — SIGNIFICANT CHANGE UP (ref 42.2–75.2)
NRBC # BLD: 0 /100 WBCS — SIGNIFICANT CHANGE UP (ref 0–0)
PLATELET # BLD AUTO: 195 K/UL — SIGNIFICANT CHANGE UP (ref 130–400)
POTASSIUM SERPL-MCNC: 3.9 MMOL/L — SIGNIFICANT CHANGE UP (ref 3.5–5)
POTASSIUM SERPL-MCNC: 4.3 MMOL/L — SIGNIFICANT CHANGE UP (ref 3.5–5)
POTASSIUM SERPL-SCNC: 3.9 MMOL/L — SIGNIFICANT CHANGE UP (ref 3.5–5)
POTASSIUM SERPL-SCNC: 4.3 MMOL/L — SIGNIFICANT CHANGE UP (ref 3.5–5)
PROT SERPL-MCNC: 6.9 G/DL — SIGNIFICANT CHANGE UP (ref 6–8)
RBC # BLD: 3.53 M/UL — LOW (ref 4.2–5.4)
RBC # FLD: 18.7 % — HIGH (ref 11.5–14.5)
SODIUM SERPL-SCNC: 139 MMOL/L — SIGNIFICANT CHANGE UP (ref 135–146)
SODIUM SERPL-SCNC: 140 MMOL/L — SIGNIFICANT CHANGE UP (ref 135–146)
WBC # BLD: 5.17 K/UL — SIGNIFICANT CHANGE UP (ref 4.8–10.8)
WBC # FLD AUTO: 5.17 K/UL — SIGNIFICANT CHANGE UP (ref 4.8–10.8)

## 2023-02-03 PROCEDURE — 99239 HOSP IP/OBS DSCHRG MGMT >30: CPT

## 2023-02-03 RX ORDER — DILTIAZEM HCL 120 MG
30 CAPSULE, EXT RELEASE 24 HR ORAL EVERY 6 HOURS
Refills: 0 | Status: DISCONTINUED | OUTPATIENT
Start: 2023-02-03 | End: 2023-02-03

## 2023-02-03 RX ORDER — ACETAMINOPHEN 500 MG
650 TABLET ORAL EVERY 8 HOURS
Refills: 0 | Status: DISCONTINUED | OUTPATIENT
Start: 2023-02-03 | End: 2023-02-04

## 2023-02-03 RX ADMIN — PANTOPRAZOLE SODIUM 40 MILLIGRAM(S): 20 TABLET, DELAYED RELEASE ORAL at 05:59

## 2023-02-03 RX ADMIN — OXYCODONE HYDROCHLORIDE 10 MILLIGRAM(S): 5 TABLET ORAL at 19:40

## 2023-02-03 RX ADMIN — ENOXAPARIN SODIUM 40 MILLIGRAM(S): 100 INJECTION SUBCUTANEOUS at 05:58

## 2023-02-03 RX ADMIN — OXYCODONE HYDROCHLORIDE 10 MILLIGRAM(S): 5 TABLET ORAL at 20:30

## 2023-02-03 RX ADMIN — CLOPIDOGREL BISULFATE 75 MILLIGRAM(S): 75 TABLET, FILM COATED ORAL at 12:34

## 2023-02-03 RX ADMIN — SENNA PLUS 2 TABLET(S): 8.6 TABLET ORAL at 21:36

## 2023-02-03 RX ADMIN — DAPAGLIFLOZIN 10 MILLIGRAM(S): 10 TABLET, FILM COATED ORAL at 05:58

## 2023-02-03 RX ADMIN — Medication 25 MICROGRAM(S): at 05:57

## 2023-02-03 RX ADMIN — GABAPENTIN 100 MILLIGRAM(S): 400 CAPSULE ORAL at 05:59

## 2023-02-03 RX ADMIN — Medication 40 MILLIGRAM(S): at 05:59

## 2023-02-03 RX ADMIN — Medication 40 MILLIGRAM(S): at 18:24

## 2023-02-03 RX ADMIN — ATORVASTATIN CALCIUM 80 MILLIGRAM(S): 80 TABLET, FILM COATED ORAL at 21:34

## 2023-02-03 RX ADMIN — Medication 50 MILLIGRAM(S): at 05:57

## 2023-02-03 RX ADMIN — SACUBITRIL AND VALSARTAN 1 TABLET(S): 24; 26 TABLET, FILM COATED ORAL at 18:23

## 2023-02-03 RX ADMIN — MAGNESIUM OXIDE 400 MG ORAL TABLET 400 MILLIGRAM(S): 241.3 TABLET ORAL at 08:38

## 2023-02-03 RX ADMIN — Medication 1: at 08:25

## 2023-02-03 RX ADMIN — SACUBITRIL AND VALSARTAN 1 TABLET(S): 24; 26 TABLET, FILM COATED ORAL at 05:58

## 2023-02-03 RX ADMIN — POLYETHYLENE GLYCOL 3350 17 GRAM(S): 17 POWDER, FOR SOLUTION ORAL at 18:23

## 2023-02-03 RX ADMIN — Medication 650 MILLIGRAM(S): at 06:55

## 2023-02-03 RX ADMIN — MAGNESIUM OXIDE 400 MG ORAL TABLET 400 MILLIGRAM(S): 241.3 TABLET ORAL at 18:23

## 2023-02-03 RX ADMIN — POLYETHYLENE GLYCOL 3350 17 GRAM(S): 17 POWDER, FOR SOLUTION ORAL at 05:59

## 2023-02-03 RX ADMIN — Medication 145 MILLIGRAM(S): at 12:34

## 2023-02-03 RX ADMIN — OXYCODONE HYDROCHLORIDE 10 MILLIGRAM(S): 5 TABLET ORAL at 08:37

## 2023-02-03 RX ADMIN — MAGNESIUM OXIDE 400 MG ORAL TABLET 400 MILLIGRAM(S): 241.3 TABLET ORAL at 12:34

## 2023-02-03 RX ADMIN — GABAPENTIN 100 MILLIGRAM(S): 400 CAPSULE ORAL at 18:23

## 2023-02-03 NOTE — DISCHARGE NOTE PROVIDER - HOSPITAL COURSE
57 year old female with PMH of COPD on 4 L home O2, CORNELIUS on CPAP, HFrEF (19% on TTE 02/2022, s/p ICD, pulmonary HTN, CAD s/p PCI to RCA)1, HTN, HLD, CVA with residual LLE weakness, DM, active smoker on home O2, presented to the ED for foot pain.     She reports that pain started in bilateral legs two days ago. She also noticed increased swelling in both legs. Pain was so intense, she could no longer ambulate or touch her feet. Symptoms are worse on her L leg. She also reports that her chronic shortness of breath has been worse than usual. She also endorses that she generally feels more swollen than usual.       ED Course:   Vitals: T 98, , /62, RR 18, 99% on 4L NC  Labs: unremarkable   Imaging: none performed    Patient admitted for bilateral LE edema, CHF Exacerbation vs. Cellulitis (less likely)     57 year old female with PMH of COPD on 4 L home O2, CORNELIUS on CPAP, HFrEF (19% on TTE 02/2022, s/p ICD, pulmonary HTN, CAD s/p PCI to RCA)1, HTN, HLD, CVA with residual LLE weakness, DM, active smoker on home O2 4L NC, presented to the ED for foot pain.  Patient does not know any of her meds or doses    SOB associated with  severe lower extremity edema due to acute on chronic HFrEF (19%)  Fluid Overload * pt was on torsemide 20mg po daily at home, holding home spironolactone   medication noncompliance     - clinically improving   - Appreciate HF Consult:   - On torsemide 40mg q12  - Entresto restarted 24-26 (1/30) BID  - metoprolol succinate to 50mg daily (from 100mg daily-1/30)   -Iron deficient (ferritin 77)- s/p IV iron sucrose as 200 mg iv daily for 5 days  -Get BMP twice daily with magnesium    - EP consult appreciated: plan for ICD per Dr Soto they will get in touch with patient.     Severe bilateral PAD  CAD s/p PCI / HTN   History of CVA  - Arterial duplex:  a) No flow is visualized in distal right SFA and proximal and mid left SFA.  b) Reconstitution in the popliteal artery bilaterally with diminished flow in tibial arteries.  -vascular cardiology on board, per note- pt is not interested in further imaging or intervention.   No further workup at this time. Recommend that she follow-up in the office in 2-4 weeks for close monitoring. If the pain worsens or if she develops critical limb ischemia, will readdress testing and revascularization.    - c/w statin and plavix     AICD Site Infection PRIOR h/x   - now removed, no erythema or drainage around site   - CT chest reviewed not suggestive of collection in lt chest/breast area  - Blood cx :Staph haemolyticus in one bottle Likely contamination.    - Repeat blood cxs (1/20 and 1/25) NGTD.   - planned for  ICD placement with EP 2/13    hx of chronic hypoxic resp failure on 4 liter oxygen  due to COPD - stable   CORNELIUS  - Cont inhalers, CPAP     JEMIMA 2/2 HFrEF Exacerbation/diuresis   - baseline 1.3-1.4  - c/w diuresis per HF recs   - f/u Cr daily if worsens get Nephro consult f/u all electrolytes  - I/O's monitoring      DM-II   Suspected diabetic neuropathy  - hold home farxiga and metformin   - fingersticks and insulin   - started on gabapentin- for suspected diabetic neuropathy    Chronic Normocytic Anemia    stable, Venofer for 5 days per HF     Shoulder pain - likely from immobility - not responsive to oxycodone  - Patient reports itching from morphine  - trial oxycodone 10   . Ensure bowel regimen and PT

## 2023-02-03 NOTE — DISCHARGE NOTE PROVIDER - NSDCFUADDINST_GEN_ALL_CORE_FT
You were restarted GDMT - Will continue 24-26 mg twice a day and metoprolol succinate to 50mg daily. Farxiga 10 mg daily and Torsemide 40mg BID.   Please follow up with your Heart failure team: Dr. Bryant    Lower extremity pain: you were seen by the vascular team in the hospital, please follow up in 2-4 weeks with   Arpita Valentine MD

## 2023-02-03 NOTE — DISCHARGE NOTE PROVIDER - NSDCMRMEDTOKEN_GEN_ALL_CORE_FT
Albuterol (Eqv-ProAir HFA) 90 mcg/inh inhalation aerosol: 2 puff(s) inhaled every 6 hours, As Needed  atorvastatin 40 mg oral tablet: 1 tab(s) orally once a day   budesonide-formoterol 160 mcg-4.5 mcg/inh inhalation aerosol: 2 puff(s) inhaled 2 times a day   ergocalciferol 1.25 mg (50,000 intl units) oral capsule: 1 cap(s) orally every 7 days   Farxiga 10 mg oral tablet: 1 tab(s) orally once a day  fenofibrate 145 mg oral tablet: 1 tab(s) orally once a day  magnesium gluconate 500 mg oral tablet: 1 tab(s) orally 2 times a day  metoprolol succinate 50 mg oral tablet, extended release: 1 tab(s) orally once a day  pantoprazole 40 mg oral delayed release tablet: 1 tab(s) orally once a day (before a meal)  Plavix 75 mg oral tablet: 1 tab(s) orally once a day  QUEtiapine 25 mg oral tablet: 1 tab(s) orally once a day (at bedtime)  sacubitril-valsartan 24 mg-26 mg oral tablet: 1 tab(s) orally 2 times a day  Synthroid 25 mcg (0.025 mg) oral tablet: 1 tab(s) orally once a day  torsemide 40 mg oral tablet: 1 tab(s) orally 2 times a day

## 2023-02-03 NOTE — PROGRESS NOTE ADULT - PROVIDER SPECIALTY LIST ADULT
Hospitalist
Hospitalist
Internal Medicine
Vascular Cardiology
Vascular Cardiology
Heart Failure
Hospitalist
Infectious Disease
Internal Medicine
Heart Failure
Hospitalist
Hospitalist
Internal Medicine

## 2023-02-03 NOTE — DISCHARGE NOTE PROVIDER - PROVIDER TOKENS
PROVIDER:[TOKEN:[[id number]],FOLLOWUP:[2 weeks]] PROVIDER:[TOKEN:[[id number]],FOLLOWUP:[2 weeks]],PROVIDER:[TOKEN:[[id number]],FOLLOWUP:[2 weeks]]

## 2023-02-03 NOTE — DISCHARGE NOTE PROVIDER - NSDCFUADDAPPT_GEN_ALL_CORE_FT
APPTS ARE READY TO BE MADE: [ ] YES    Best Family or Patient Contact (if needed):    Additional Information about above appointments (if needed):    1: Dr. Bryant  2:   3:     Other comments or requests:

## 2023-02-03 NOTE — PROGRESS NOTE ADULT - ASSESSMENT
58 yo F PMHx  COPD on 4 L home O2, CORNELIUS on CPAP, chronic HFrEF (19% on TTE 02/2022, s/p ICD, pulmonary HTN, CAD s/p PCI to RCA)1, HTN, HLD, CVA with residual LLE weakness, DM, recent ICD infection s/o removal, active smoker on home O2 4L NC, presented to the ED for foot pain.     Acute on chronic HFrEF:   Severe Pulmonary HTN:   Severe Tricuspid Regurgitation:  SOB and Leg edema, Pro-BNP 26K,  much higher than prior  CXR showed no acute infiltrates.   Echo 12/22 showed LVEF 25-30%, mildly enlarged RV with RV volume overload, severe TR, mild MR< severe pulmonary HTN  s/p Bumex drip and hypertonic saline. Switch to Torsemide 40mg PO BID.   Continue Metoprolol and Entresto. Resume Farxiga.   Heart failure follow up.   History of AICD infection s/p removal, EP plan for ICD per Dr Soto on 2/13, continue with telemetry monitoring for now  Continue Venofer infusion, ends 2/3/23    Severe bilateral PAD  Arterial duplex: No flow is visualized in distal right SFA and proximal and mid left SFA.  Vascular cardiology consulted, patient is not interested in any further work up or treatment.    Continue Plavix and Lipitor.     JEMIMA on CKD stage 3: likely cardiorenal syndrome.   Cr baseline 1.4, currently 1.9  Continue with diuresis,    CAD s/p PCI  Stable, no chest pain.   Continue Plavix, metoprolol and Lipitor.     Chronic hypoxemic respiratory failure 2/2 COPD on 4 L at baseline   CORNELIUS on CPAP   CPAP ordered based on prior setting, PEEP 8 and FiO2 40%  Continue Symbicort and albuterol prn, continue nasal canula.     History of CVA   Residual left side weakness  Continue ASA and Lipitor.      Hypothyroidism: TSH 4.7, FT4 1.3 normal, Continue Levothyroxine.     Chronic Normocytic Anemia - stable      Overall prognosis poor given chronic medical conditions    Discharge home today,

## 2023-02-03 NOTE — DISCHARGE NOTE PROVIDER - CARE PROVIDERS DIRECT ADDRESSES
,DirectAddress_Unknown ,DirectAddress_Unknown,[email or web address]

## 2023-02-03 NOTE — DISCHARGE NOTE PROVIDER - CARE PROVIDER_API CALL
Catina Valentine (MD; MPH)  Internal Medicine  [address] [place] [address], [address]  [place] [address]  Phone: [phone]  Fax: [phone]  Follow Up Time: 2 weeks   Catina Valentine; MPH)  Internal Medicine  [address] [place] [address], [address]  [place] [address]  Phone: [phone]  Fax: [phone]  Follow Up Time: 2 weeks    Joel Gaspar; MD)  Adv Heart Fail Trnsplnt Cardio; Cardiovascular Disease; Internal Medicine  [address] [place] [address], [address] [place] [address]  Phone: [phone]  Fax: [phone]  Follow Up Time: 2 weeks

## 2023-02-03 NOTE — DISCHARGE NOTE PROVIDER - NSFOLLOWUPCLINICS_GEN_ALL_ED_FT
[facility]  Cardiology  [address] [place] [address], [address]  [place] [address]  Phone: [phone]  Fax: [phone]  Follow Up Time: 2 weeks

## 2023-02-03 NOTE — DISCHARGE NOTE PROVIDER - NSDCCPCAREPLAN_GEN_ALL_CORE_FT
PRINCIPAL DISCHARGE DIAGNOSIS  Diagnosis: CHF, acute  Assessment and Plan of Treatment: CHF-  Heart failure is a condition in which the heart has trouble pumping blood. This may mean that the heart cannot pump enough blood out to the body or that the heart does not fill up with enough blood. When this happens, parts of the body do not get the blood and oxygen they need to function properly. This can cause symptoms such as breathing problems, tiredness (fatigue), swelling, and confusion.  Heart failure exacerbation refers to heart failure symptoms that get worse. The symptoms may get worse suddenly or develop slowly over time. Heart failure exacerbation is a serious medical problem that should be treated right away.  What are the causes?  A heart failure exacerbation can be triggered by:  •Not taking your heart failure medicines correctly.  •Infections.  •Eating an unhealthy diet or a diet that is high in salt (sodium).  •Other heart conditions such as an irregular heart rhythm (arrhythmia).  During this hospital stay you were given IV medications that would help promote diuresis. Your inputs and outputs were monitored and it was found that your fluid overload status had improved. Will be asked to continue your diuretic pills after discharge and you will be asked to folllow up with your Heart team outpatient.        SECONDARY DISCHARGE DIAGNOSES  Diagnosis: Lower extremity pain  Assessment and Plan of Treatment: you also described lower extremity pain. No flow is visualized in distal right SFA and proximal and mid left SFA.  Reconstitution in the popliteal artery bilaterally with diminished flow   in tibial arteries.

## 2023-02-03 NOTE — DISCHARGE NOTE PROVIDER - NSDCFUSCHEDAPPT_GEN_ALL_CORE_FT
Pardeep Soto  [facility] PreAdmits  Scheduled Appointment: 02/08/2023    Pardeep Soto  [facility] PreAdmits  Scheduled Appointment: 02/13/2023     Pardeep Soto  [facility] PreAdmits  Scheduled Appointment: 02/08/2023    Cyn Manzano Physician formerly [facility]  CARDIOLOGY [address] [place] [address]  Scheduled Appointment: 02/10/2023    Pardeep Soto  [facility] PreAdmits  Scheduled Appointment: 02/13/2023

## 2023-02-03 NOTE — PROGRESS NOTE ADULT - SUBJECTIVE AND OBJECTIVE BOX
WILLIAN MARY  57y  Female      Patient is a 57y old  Female who presents with a chief complaint of Heart failure (02 Feb 2023 11:09)      INTERVAL HPI/OVERNIGHT EVENTS:  She is still with leg pain, no chest pain, no event on telemetry.   Vital Signs Last 24 Hrs  T(C): 36.6 (03 Feb 2023 05:00), Max: 36.6 (03 Feb 2023 05:00)  T(F): 97.9 (03 Feb 2023 05:00), Max: 97.9 (03 Feb 2023 05:00)  HR: 87 (03 Feb 2023 08:51) (86 - 87)  BP: 111/63 (03 Feb 2023 08:51) (95/52 - 122/63)  BP(mean): --  RR: 18 (03 Feb 2023 05:00) (18 - 18)  SpO2: --          02-02-23 @ 07:01  -  02-03-23 @ 07:00  --------------------------------------------------------  IN: 526 mL / OUT: 3700 mL / NET: -3174 mL            Consultant(s) Notes Reviewed:  [x ] YES  [ ] NO          MEDICATIONS  (STANDING):  atorvastatin 80 milliGRAM(s) Oral at bedtime  budesonide 160 MICROgram(s)/formoterol 4.5 MICROgram(s) Inhaler 2 Puff(s) Inhalation two times a day  clopidogrel Tablet 75 milliGRAM(s) Oral daily  dapagliflozin 10 milliGRAM(s) Oral every 24 hours  dextrose 5%. 1000 milliLiter(s) (100 mL/Hr) IV Continuous <Continuous>  dextrose 5%. 1000 milliLiter(s) (50 mL/Hr) IV Continuous <Continuous>  dextrose 50% Injectable 25 Gram(s) IV Push once  dextrose 50% Injectable 12.5 Gram(s) IV Push once  dextrose 50% Injectable 25 Gram(s) IV Push once  enoxaparin Injectable 40 milliGRAM(s) SubCutaneous every 24 hours  fenofibrate Tablet 145 milliGRAM(s) Oral daily  gabapentin 100 milliGRAM(s) Oral two times a day  glucagon  Injectable 1 milliGRAM(s) IntraMuscular once  insulin lispro (ADMELOG) corrective regimen sliding scale   SubCutaneous three times a day before meals  levothyroxine 25 MICROGram(s) Oral daily  magnesium oxide 400 milliGRAM(s) Oral three times a day with meals  metoprolol succinate ER 50 milliGRAM(s) Oral daily  pantoprazole    Tablet 40 milliGRAM(s) Oral before breakfast  polyethylene glycol 3350 17 Gram(s) Oral two times a day  sacubitril 24 mG/valsartan 26 mG 1 Tablet(s) Oral two times a day  senna 2 Tablet(s) Oral at bedtime  torsemide 40 milliGRAM(s) Oral every 12 hours    MEDICATIONS  (PRN):  acetaminophen     Tablet .. 650 milliGRAM(s) Oral every 8 hours PRN Temp greater or equal to 38C (100.4F), Mild Pain (1 - 3)  dextrose Oral Gel 15 Gram(s) Oral once PRN Blood Glucose LESS THAN 70 milliGRAM(s)/deciliter  melatonin 5 milliGRAM(s) Oral at bedtime PRN Sleep  oxyCODONE    IR 10 milliGRAM(s) Oral every 6 hours PRN Moderate Pain (4 - 6)      LABS                          9.8    5.17  )-----------( 195      ( 03 Feb 2023 06:30 )             30.8     02-03    139  |  92<L>  |  50<H>  ----------------------------<  130<H>  3.9   |  34<H>  |  1.9<H>    Ca    9.6      03 Feb 2023 06:30  Mg     2.2     02-03    TPro  6.9  /  Alb  3.6  /  TBili  1.2  /  DBili  x   /  AST  18  /  ALT  <5  /  AlkPhos  84  02-03          Lactate Trend        CAPILLARY BLOOD GLUCOSE      POCT Blood Glucose.: 96 mg/dL (03 Feb 2023 11:41)        RADIOLOGY & ADDITIONAL TESTS:    Imaging Personally Reviewed:  [ ] YES  [ ] NO    HEALTH ISSUES - PROBLEM Dx:          PHYSICAL EXAM:  GENERAL: NAD, well-developed.  HEAD:  Atraumatic, Normocephalic.  EYES: EOMI, PERRLA, conjunctiva and sclera clear.  NECK: Supple, No JVD.  CHEST/LUNG: Clear to auscultation bilaterally; No wheeze.  HEART: Regular rate and rhythm; S1 S2.   ABDOMEN: Soft, Nontender, Nondistended; Bowel sounds present.  EXTREMITIES:  2+ Peripheral Pulses, leg edema 1+  PSYCH: AAOx3.  NEUROLOGY:   SKIN: No rashes or lesions.

## 2023-02-04 LAB — MAGNESIUM SERPL-MCNC: 2.1 MG/DL — SIGNIFICANT CHANGE UP (ref 1.8–2.4)

## 2023-02-08 DIAGNOSIS — J44.9 CHRONIC OBSTRUCTIVE PULMONARY DISEASE, UNSPECIFIED: ICD-10-CM

## 2023-02-08 DIAGNOSIS — D64.9 ANEMIA, UNSPECIFIED: ICD-10-CM

## 2023-02-08 DIAGNOSIS — L03.116 CELLULITIS OF LEFT LOWER LIMB: ICD-10-CM

## 2023-02-08 DIAGNOSIS — J96.11 CHRONIC RESPIRATORY FAILURE WITH HYPOXIA: ICD-10-CM

## 2023-02-08 DIAGNOSIS — I13.0 HYPERTENSIVE HEART AND CHRONIC KIDNEY DISEASE WITH HEART FAILURE AND STAGE 1 THROUGH STAGE 4 CHRONIC KIDNEY DISEASE, OR UNSPECIFIED CHRONIC KIDNEY DISEASE: ICD-10-CM

## 2023-02-08 DIAGNOSIS — I70.203 UNSPECIFIED ATHEROSCLEROSIS OF NATIVE ARTERIES OF EXTREMITIES, BILATERAL LEGS: ICD-10-CM

## 2023-02-08 DIAGNOSIS — I87.8 OTHER SPECIFIED DISORDERS OF VEINS: ICD-10-CM

## 2023-02-08 DIAGNOSIS — Z99.81 DEPENDENCE ON SUPPLEMENTAL OXYGEN: ICD-10-CM

## 2023-02-08 DIAGNOSIS — N17.9 ACUTE KIDNEY FAILURE, UNSPECIFIED: ICD-10-CM

## 2023-02-08 DIAGNOSIS — E11.22 TYPE 2 DIABETES MELLITUS WITH DIABETIC CHRONIC KIDNEY DISEASE: ICD-10-CM

## 2023-02-08 DIAGNOSIS — I44.7 LEFT BUNDLE-BRANCH BLOCK, UNSPECIFIED: ICD-10-CM

## 2023-02-08 DIAGNOSIS — E11.40 TYPE 2 DIABETES MELLITUS WITH DIABETIC NEUROPATHY, UNSPECIFIED: ICD-10-CM

## 2023-02-08 DIAGNOSIS — Z20.822 CONTACT WITH AND (SUSPECTED) EXPOSURE TO COVID-19: ICD-10-CM

## 2023-02-08 DIAGNOSIS — E87.5 HYPERKALEMIA: ICD-10-CM

## 2023-02-08 DIAGNOSIS — E78.5 HYPERLIPIDEMIA, UNSPECIFIED: ICD-10-CM

## 2023-02-08 DIAGNOSIS — M25.519 PAIN IN UNSPECIFIED SHOULDER: ICD-10-CM

## 2023-02-08 DIAGNOSIS — E03.9 HYPOTHYROIDISM, UNSPECIFIED: ICD-10-CM

## 2023-02-08 DIAGNOSIS — I25.10 ATHEROSCLEROTIC HEART DISEASE OF NATIVE CORONARY ARTERY WITHOUT ANGINA PECTORIS: ICD-10-CM

## 2023-02-08 DIAGNOSIS — E11.51 TYPE 2 DIABETES MELLITUS WITH DIABETIC PERIPHERAL ANGIOPATHY WITHOUT GANGRENE: ICD-10-CM

## 2023-02-08 DIAGNOSIS — I36.1 NONRHEUMATIC TRICUSPID (VALVE) INSUFFICIENCY: ICD-10-CM

## 2023-02-08 DIAGNOSIS — F32.A DEPRESSION, UNSPECIFIED: ICD-10-CM

## 2023-02-08 DIAGNOSIS — I31.39 OTHER PERICARDIAL EFFUSION (NONINFLAMMATORY): ICD-10-CM

## 2023-02-08 DIAGNOSIS — R26.0 ATAXIC GAIT: ICD-10-CM

## 2023-02-08 DIAGNOSIS — I50.23 ACUTE ON CHRONIC SYSTOLIC (CONGESTIVE) HEART FAILURE: ICD-10-CM

## 2023-02-08 DIAGNOSIS — R26.2 DIFFICULTY IN WALKING, NOT ELSEWHERE CLASSIFIED: ICD-10-CM

## 2023-02-08 DIAGNOSIS — N18.2 CHRONIC KIDNEY DISEASE, STAGE 2 (MILD): ICD-10-CM

## 2023-02-08 DIAGNOSIS — G47.30 SLEEP APNEA, UNSPECIFIED: ICD-10-CM

## 2023-02-08 DIAGNOSIS — F41.9 ANXIETY DISORDER, UNSPECIFIED: ICD-10-CM

## 2023-02-08 DIAGNOSIS — I27.20 PULMONARY HYPERTENSION, UNSPECIFIED: ICD-10-CM

## 2023-02-08 DIAGNOSIS — F17.210 NICOTINE DEPENDENCE, CIGARETTES, UNCOMPLICATED: ICD-10-CM

## 2023-02-10 ENCOUNTER — APPOINTMENT (OUTPATIENT)
Dept: CARDIOLOGY | Facility: CLINIC | Age: 58
End: 2023-02-10

## 2023-02-23 ENCOUNTER — APPOINTMENT (OUTPATIENT)
Dept: CARDIOLOGY | Facility: CLINIC | Age: 58
End: 2023-02-23

## 2023-03-15 NOTE — PROGRESS NOTE ADULT - SUBJECTIVE AND OBJECTIVE BOX
Date of Admission: 21    Interval History:    - Patient resting in bed, reports feeling anxious but less SOB today  - Remains massively fluid overloaded, responding well to IV diuretics    HISTORY OF PRESENT ILLNESS:     56 year old female with a pmhx of chronic hypoxic respiratory failure (on 4L home O2) secondary to ( HFrEF 35-40%, GIII DD)  with pulmonary hypertension, HTN, HLD, DM2, active smoker , CVA with residual LE weakness (2014, wheelchair bound),  COPD , mood /depression,  presents of worsening shortness of breath and increase weight with  B/L LE over past week. Associated with increase oxygen use, mild SOB at rest orthopnea, and palpitations. She endorses compliance with home torsemide, as well as fluid restriction and low salt diet. She denies CP.     Patient resting in bed, c/o generalized pain and abdominal discomfort, reports for couple of days she had worsening SOB, and was feeling bloated. Patient is not very active however she was SOB with minimal exertion. Patient denies c/p, palpitations, headaches, bleeding, LH, dizziness, orthopnea, PND, LOC, cough. Endorses compliance with medications and follows a low sodium diet       PAST MEDICAL & SURGICAL HISTORY:      Hypertension  Hyperlipidemia  Anxiety and depression  COPD, severe  CHF (congestive heart failure)  Cerebrovascular accident (CVA)Multiple  Type 2 diabetes mellitus  CKD (chronic kidney disease), stage II  No significant past surgical history        FAMILY HISTORY:  No pertinent family history of premature cardiovascular disease in first degree relatives.  Mother:  of cancer at 65  Father:  of an overdose at 50    SOCIAL HISTORY:      Current every day smoker, 2 cigarettes a day, denies alcohol or drug use    Allergies    No Known Allergies    Intolerances      PHYSICAL EXAM:  General Appearance: well appearing, normal for age and gender. 	  Neck: unable to assess due to body habitus JVP, no bruit.   Cardiovascular: regular rate and rhythm S1 S2, , No murmurs, Generalized edema  Respiratory: B/L expiratory wheezing  Psychiatry: Alert and oriented x 3, Mood & affect appropriate  Gastrointestinal:  Soft, Non-tender  Skin/Integumentary : No rashes, No ecchymoses, No cyanosis	  Neurologic: Non-focal  Musculoskeletal/ extremities: Normal range of motion, No clubbing, cyanosis   Vascular: Peripheral pulses palpable 2+ bilaterally    CARDIAC MARKERS:    Serum Pro-Brain Natriuretic Peptide: 7542 pg/mL (21 @ 11:27)      TELEMETRY EVENTS: 	    EC/8/21    Ventricular Rate 72 BPM  Atrial Rate 72 BPM  P-R Interval 192 ms  QRS Duration 162 ms  Q-T Interval 498 ms  QTC Calculation(Bazett) 545 ms  P Axis 35 degrees  R Axis -88 degrees  T Axis 82 degrees  Diagnosis Line Normal sinus rhythm  Possible Left atrial enlargement  Left axis deviation  Non-specific intra-ventricular conduction block  Abnormal ECG  Confirmed by CARLOTA GAUTHIER MD ([id number]) on 2021 8:55:01 AM      PREVIOUS DIAGNOSTIC TESTING:      TTE 21    Summary:   1. Left ventricular ejection fraction, by visual estimation, is 35 to 40%.   2.Moderately decreased global left ventricular systolic function.   3. Multiple left ventricular regional wall motion abnormalities exist. See wall motion findings.   4. Elevated left atrial and left ventricular end-diastolic pressures.   5. Mild concentric left ventricular hypertrophy.   6. Mildly increased LV wall thickness.   7. Normal left ventricular internal cavity size.   8. Spectral Doppler shows restrictive pattern of left ventricular myocardial filling (Grade III diastolic dysfunction).  9. Moderately reduced RV systolic function.  10. Mildly enlarged left atrium.  11. Moderately enlarged right atrium.  12. Small pericardial effusion.  13. Mild to moderate mitral valve regurgitation.  14. Moderate-severe tricuspid regurgitation.  15.Mild aortic regurgitation.  16. Sclerotic aortic valve with normal opening.  17. Estimated pulmonary artery systolic pressure is 50.0 mmHg assuming a right atrial pressure of 15 mmHg, which is consistent with moderate pulmonary hypertension.  18. Pulmonary hypertension is present.  19. LA volume Index is 36.7 ml/m² ml/m2.    	    Home Medications:  Albuterol (Eqv-ProAir HFA) 90 mcg/inh inhalation aerosol: 2 puff(s) inhaled every 6 hours, As Needed (2021 11:22)  aspirin 81 mg oral tablet: 1 tab(s) orally once a day (2021 11:22)  fenofibrate 145 mg oral tablet: 1 tab(s) orally once a day (2021 11:22)  glipiZIDE 10 mg oral tablet: 1 tab(s) orally 2 times a day (2021 11:22)  Lovaza 1000 mg oral capsule: 2 cap(s) orally 2 times a day (2021 14:52)  metFORMIN 1000 mg oral tablet: 1 tab(s) orally 2 times a day Do not take until  (2021 12:58)  Plavix 75 mg oral tablet: 1 tab(s) orally once a day (2021 11:22)  Vitamin D2 1.25 mg (50,000 intl units) oral capsule: 1 cap(s) orally once a week (2021 14:52)    MEDICATIONS  (STANDING):  aspirin  chewable 81 milliGRAM(s) Oral daily  atorvastatin 80 milliGRAM(s) Oral at bedtime  budesonide 160 MICROgram(s)/formoterol 4.5 MICROgram(s) Inhaler 2 Puff(s) Inhalation two times a day  chlorhexidine 4% Liquid 1 Application(s) Topical <User Schedule>  clopidogrel Tablet 75 milliGRAM(s) Oral daily  enoxaparin Injectable 40 milliGRAM(s) SubCutaneous daily  fenofibrate Tablet 145 milliGRAM(s) Oral daily  furosemide   Injectable 40 milliGRAM(s) IV Push two times a day  magnesium sulfate  IVPB 2 Gram(s) IV Intermittent once  metoprolol succinate ER 50 milliGRAM(s) Oral daily  omega-3-Acid Ethyl Esters 2 Gram(s) Oral two times a day  pantoprazole    Tablet 40 milliGRAM(s) Oral before breakfast  sacubitril 49 mG/valsartan 51 mG 1 Tablet(s) Oral two times a day    MEDICATIONS  (PRN):  ALBUTerol    90 MICROgram(s) HFA Inhaler 2 Puff(s) Inhalation every 6 hours PRN Shortness of Breath and/or Wheezing         Date of Admission: 21    Interval History:    - Patient resting in bed, reports feeling anxious but less SOB today  - Remains massively fluid overloaded, responding well to IV diuretics        HISTORY OF PRESENT ILLNESS:     56 year old female with a pmhx of chronic hypoxic respiratory failure (on 4L home O2) secondary to ( HFrEF 35-40%, GIII DD)  with pulmonary hypertension, HTN, HLD, DM2, active smoker , CVA with residual LE weakness (2014, wheelchair bound),  COPD , mood /depression,  presents of worsening shortness of breath and increase weight with  B/L LE over past week. Associated with increase oxygen use, mild SOB at rest orthopnea, and palpitations. She endorses compliance with home torsemide, as well as fluid restriction and low salt diet. She denies CP.     Patient resting in bed, c/o generalized pain and abdominal discomfort, reports for couple of days she had worsening SOB, and was feeling bloated. Patient is not very active however she was SOB with minimal exertion. Patient denies c/p, palpitations, headaches, bleeding, LH, dizziness, orthopnea, PND, LOC, cough. Endorses compliance with medications and follows a low sodium diet       PAST MEDICAL & SURGICAL HISTORY:      Hypertension  Hyperlipidemia  Anxiety and depression  COPD, severe  CHF (congestive heart failure)  Cerebrovascular accident (CVA)Multiple  Type 2 diabetes mellitus  CKD (chronic kidney disease), stage II  No significant past surgical history        FAMILY HISTORY:  No pertinent family history of premature cardiovascular disease in first degree relatives.  Mother:  of cancer at 65  Father:  of an overdose at 50    SOCIAL HISTORY:      Current every day smoker, 2 cigarettes a day, denies alcohol or drug use    Allergies    No Known Allergies    Intolerances      PHYSICAL EXAM:  General Appearance: well appearing, normal for age and gender. 	  Neck: unable to assess due to body habitus JVP, no bruit.   Cardiovascular: regular rate and rhythm S1 S2, , No murmurs, Generalized edema  Respiratory: B/L expiratory wheezing  Psychiatry: Alert and oriented x 3, Mood & affect appropriate  Gastrointestinal:  Soft, Non-tender  Skin/Integumentary : No rashes, No ecchymoses, No cyanosis	  Neurologic: Non-focal  Musculoskeletal/ extremities: Normal range of motion, No clubbing, cyanosis   Vascular: Peripheral pulses palpable 2+ bilaterally    CARDIAC MARKERS:    Serum Pro-Brain Natriuretic Peptide: 7542 pg/mL (21 @ 11:27)      TELEMETRY EVENTS: 	    EC/8/21    Ventricular Rate 72 BPM  Atrial Rate 72 BPM  P-R Interval 192 ms  QRS Duration 162 ms  Q-T Interval 498 ms  QTC Calculation(Bazett) 545 ms  P Axis 35 degrees  R Axis -88 degrees  T Axis 82 degrees  Diagnosis Line Normal sinus rhythm  Possible Left atrial enlargement  Left axis deviation  Non-specific intra-ventricular conduction block  Abnormal ECG  Confirmed by CARLOTA GAUTHIER MD ([id number]) on 2021 8:55:01 AM      PREVIOUS DIAGNOSTIC TESTING:      TTE 21    Summary:   1. Left ventricular ejection fraction, by visual estimation, is 35 to 40%.   2.Moderately decreased global left ventricular systolic function.   3. Multiple left ventricular regional wall motion abnormalities exist. See wall motion findings.   4. Elevated left atrial and left ventricular end-diastolic pressures.   5. Mild concentric left ventricular hypertrophy.   6. Mildly increased LV wall thickness.   7. Normal left ventricular internal cavity size.   8. Spectral Doppler shows restrictive pattern of left ventricular myocardial filling (Grade III diastolic dysfunction).  9. Moderately reduced RV systolic function.  10. Mildly enlarged left atrium.  11. Moderately enlarged right atrium.  12. Small pericardial effusion.  13. Mild to moderate mitral valve regurgitation.  14. Moderate-severe tricuspid regurgitation.  15.Mild aortic regurgitation.  16. Sclerotic aortic valve with normal opening.  17. Estimated pulmonary artery systolic pressure is 50.0 mmHg assuming a right atrial pressure of 15 mmHg, which is consistent with moderate pulmonary hypertension.  18. Pulmonary hypertension is present.  19. LA volume Index is 36.7 ml/m² ml/m2.    	    Home Medications:  Albuterol (Eqv-ProAir HFA) 90 mcg/inh inhalation aerosol: 2 puff(s) inhaled every 6 hours, As Needed (2021 11:22)  aspirin 81 mg oral tablet: 1 tab(s) orally once a day (2021 11:22)  fenofibrate 145 mg oral tablet: 1 tab(s) orally once a day (2021 11:22)  glipiZIDE 10 mg oral tablet: 1 tab(s) orally 2 times a day (2021 11:22)  Lovaza 1000 mg oral capsule: 2 cap(s) orally 2 times a day (2021 14:52)  metFORMIN 1000 mg oral tablet: 1 tab(s) orally 2 times a day Do not take until  (2021 12:58)  Plavix 75 mg oral tablet: 1 tab(s) orally once a day (2021 11:22)  Vitamin D2 1.25 mg (50,000 intl units) oral capsule: 1 cap(s) orally once a week (2021 14:52)    MEDICATIONS  (STANDING):  aspirin  chewable 81 milliGRAM(s) Oral daily  atorvastatin 80 milliGRAM(s) Oral at bedtime  budesonide 160 MICROgram(s)/formoterol 4.5 MICROgram(s) Inhaler 2 Puff(s) Inhalation two times a day  chlorhexidine 4% Liquid 1 Application(s) Topical <User Schedule>  clopidogrel Tablet 75 milliGRAM(s) Oral daily  enoxaparin Injectable 40 milliGRAM(s) SubCutaneous daily  fenofibrate Tablet 145 milliGRAM(s) Oral daily  furosemide   Injectable 40 milliGRAM(s) IV Push two times a day  magnesium sulfate  IVPB 2 Gram(s) IV Intermittent once  metoprolol succinate ER 50 milliGRAM(s) Oral daily  omega-3-Acid Ethyl Esters 2 Gram(s) Oral two times a day  pantoprazole    Tablet 40 milliGRAM(s) Oral before breakfast  sacubitril 49 mG/valsartan 51 mG 1 Tablet(s) Oral two times a day    MEDICATIONS  (PRN):  ALBUTerol    90 MICROgram(s) HFA Inhaler 2 Puff(s) Inhalation every 6 hours PRN Shortness of Breath and/or Wheezing         There are no Wet Read(s) to document.

## 2023-03-30 NOTE — DISCHARGE NOTE NURSING/CASE MANAGEMENT/SOCIAL WORK - HAS THE PATIENT RECEIVED THE INFLUENZA VACCINE THIS SEASON?
yes... Bactrim Pregnancy And Lactation Text: This medication is Pregnancy Category D and is known to cause fetal risk.  It is also excreted in breast milk.

## 2023-04-07 NOTE — DISCHARGE NOTE PROVIDER - EXTENDED VTE YES NO FOR MLM ENOXAPARIN
, Eucrisa Counseling: Patient may experience a mild burning sensation during topical application. Eucrisa is not approved in children less than 3 months of age.

## 2023-06-11 NOTE — ED ADULT NURSE NOTE - NS PRO PASSIVE SMOKE EXP
[facility] No

## 2023-09-21 ENCOUNTER — RX RENEWAL (OUTPATIENT)
Age: 58
End: 2023-09-21

## 2023-10-04 NOTE — PROGRESS NOTE ADULT - EYES
09/30/23 1047   Onset Documentation   Rapid Response Initiated By RN   [place] [facility] [place]   Arrival Time 0915   Event End Time 1030   Level II Called Yes     Bedside nurse concerned patient with bleeding from her left naris.  Pressure held for 25 minutes.  Dr. Butch Ibrahim at bedside. Patient confused.  Humidity added to NC; weaned to 2 L NC from 3 L.  VS checked.  Surgery at bedside to clean wound; dressing changed.  Labs sent.      No additional bleeding from nose.  Plan to monitor patient on LKSD 60 at this time.  Bedside nurse updated and encouraged to call with concerns.   
RADAR page    Notified bedside nurse of RADAR page; patient off floor at procedure.   
PERRL/EOMI/conjunctiva clear/normal

## 2023-10-05 NOTE — PROGRESS NOTE ADULT - ASSESSMENT
56 year old female with PMH of HTN, DM 2, HFrEF, COPD on 4L O2, CVA with residual LE weakness and depression came to ED for worsening SOB and LE edema for the last two weeks. She was not taking her Lasix.   In the ED, Chest X-ray shows pulm vascular congestion and cardiomegaly. BNP elevated. Started on IV Lasix. Pt hypoxic to 94% on NC    A/P:   Acute on chronic hypoxic respiratory failure  Acute HFrEF:   Patient with LE edema, SOB , Pro-BNO  CXR showed vascular congestion.   Echo LVEF 38%,   Continue Lasix 40mg IV BID, Metoprolol XL 200mg mg Daily and Lisinopril 0mg daily.   Daily weight, fluid restriction 1.5L /day, low sodium diet.     Chronic Respiratory failure:   COPD  Chronic tobacco abuse:   Mild wheezing on exam.   Continue Symbicort, albuterol prn.     History of CVA with residual LE weakness  Continue ASA, Lipitor   Magnesium deficiency.    HTN: Continue lisinopril, Lasix and Metoprolol.    DM II  - FS goal 110-180  controlled    Depression: wellbutrin, risperidone    Full Code  From home     #Progress Note Handoff:  Pending (specify):  adequate diuresis  Family discussion:  need to continue Iv diuresis, daily weight. Counseled for smoking cessation.   Disposition: Home.   Hydroquinone Counseling:  Patient advised that medication may result in skin irritation, lightening (hypopigmentation), dryness, and burning.  In the event of skin irritation, the patient was advised to reduce the amount of the drug applied or use it less frequently.  Rarely, spots that are treated with hydroquinone can become darker (pseudoochronosis).  Should this occur, patient instructed to stop medication and call the office. The patient verbalized understanding of the proper use and possible adverse effects of hydroquinone.  All of the patient's questions and concerns were addressed.

## 2023-11-10 ENCOUNTER — INPATIENT (INPATIENT)
Facility: HOSPITAL | Age: 58
LOS: 8 days | Discharge: HOME HEALTH | DRG: 291 | End: 2023-11-19
Attending: STUDENT IN AN ORGANIZED HEALTH CARE EDUCATION/TRAINING PROGRAM | Admitting: STUDENT IN AN ORGANIZED HEALTH CARE EDUCATION/TRAINING PROGRAM
Payer: MEDICARE

## 2023-11-10 VITALS — OXYGEN SATURATION: 99 % | HEART RATE: 82 BPM

## 2023-11-10 DIAGNOSIS — R53.1 WEAKNESS: ICD-10-CM

## 2023-11-10 DIAGNOSIS — R09.89 OTHER SPECIFIED SYMPTOMS AND SIGNS INVOLVING THE CIRCULATORY AND RESPIRATORY SYSTEMS: ICD-10-CM

## 2023-11-10 LAB
ALBUMIN SERPL ELPH-MCNC: 3.7 G/DL — SIGNIFICANT CHANGE UP (ref 3.5–5.2)
ALBUMIN SERPL ELPH-MCNC: 3.7 G/DL — SIGNIFICANT CHANGE UP (ref 3.5–5.2)
ALP SERPL-CCNC: 205 U/L — HIGH (ref 30–115)
ALP SERPL-CCNC: 205 U/L — HIGH (ref 30–115)
ALT FLD-CCNC: 99 U/L — HIGH (ref 0–41)
ALT FLD-CCNC: 99 U/L — HIGH (ref 0–41)
ANION GAP SERPL CALC-SCNC: 21 MMOL/L — HIGH (ref 7–14)
ANION GAP SERPL CALC-SCNC: 21 MMOL/L — HIGH (ref 7–14)
ANISOCYTOSIS BLD QL: SLIGHT — SIGNIFICANT CHANGE UP
ANISOCYTOSIS BLD QL: SLIGHT — SIGNIFICANT CHANGE UP
AST SERPL-CCNC: 180 U/L — HIGH (ref 0–41)
AST SERPL-CCNC: 180 U/L — HIGH (ref 0–41)
BASE EXCESS BLDV CALC-SCNC: 2.3 MMOL/L — SIGNIFICANT CHANGE UP (ref -2–3)
BASE EXCESS BLDV CALC-SCNC: 2.3 MMOL/L — SIGNIFICANT CHANGE UP (ref -2–3)
BASOPHILS # BLD AUTO: 0 K/UL — SIGNIFICANT CHANGE UP (ref 0–0.2)
BASOPHILS # BLD AUTO: 0 K/UL — SIGNIFICANT CHANGE UP (ref 0–0.2)
BASOPHILS NFR BLD AUTO: 0 % — SIGNIFICANT CHANGE UP (ref 0–1)
BASOPHILS NFR BLD AUTO: 0 % — SIGNIFICANT CHANGE UP (ref 0–1)
BILIRUB SERPL-MCNC: 2.5 MG/DL — HIGH (ref 0.2–1.2)
BILIRUB SERPL-MCNC: 2.5 MG/DL — HIGH (ref 0.2–1.2)
BUN SERPL-MCNC: 57 MG/DL — HIGH (ref 10–20)
BUN SERPL-MCNC: 57 MG/DL — HIGH (ref 10–20)
BURR CELLS BLD QL SMEAR: PRESENT — SIGNIFICANT CHANGE UP
BURR CELLS BLD QL SMEAR: PRESENT — SIGNIFICANT CHANGE UP
CA-I SERPL-SCNC: 1.17 MMOL/L — SIGNIFICANT CHANGE UP (ref 1.15–1.33)
CA-I SERPL-SCNC: 1.17 MMOL/L — SIGNIFICANT CHANGE UP (ref 1.15–1.33)
CALCIUM SERPL-MCNC: 9.4 MG/DL — SIGNIFICANT CHANGE UP (ref 8.4–10.5)
CALCIUM SERPL-MCNC: 9.4 MG/DL — SIGNIFICANT CHANGE UP (ref 8.4–10.5)
CHLORIDE SERPL-SCNC: 96 MMOL/L — LOW (ref 98–110)
CHLORIDE SERPL-SCNC: 96 MMOL/L — LOW (ref 98–110)
CO2 SERPL-SCNC: 22 MMOL/L — SIGNIFICANT CHANGE UP (ref 17–32)
CO2 SERPL-SCNC: 22 MMOL/L — SIGNIFICANT CHANGE UP (ref 17–32)
CREAT SERPL-MCNC: 3 MG/DL — HIGH (ref 0.7–1.5)
CREAT SERPL-MCNC: 3 MG/DL — HIGH (ref 0.7–1.5)
EGFR: 18 ML/MIN/1.73M2 — LOW
EGFR: 18 ML/MIN/1.73M2 — LOW
ELLIPTOCYTES BLD QL SMEAR: SLIGHT — SIGNIFICANT CHANGE UP
ELLIPTOCYTES BLD QL SMEAR: SLIGHT — SIGNIFICANT CHANGE UP
EOSINOPHIL # BLD AUTO: 0 K/UL — SIGNIFICANT CHANGE UP (ref 0–0.7)
EOSINOPHIL # BLD AUTO: 0 K/UL — SIGNIFICANT CHANGE UP (ref 0–0.7)
EOSINOPHIL NFR BLD AUTO: 0 % — SIGNIFICANT CHANGE UP (ref 0–8)
EOSINOPHIL NFR BLD AUTO: 0 % — SIGNIFICANT CHANGE UP (ref 0–8)
FLUAV AG NPH QL: SIGNIFICANT CHANGE UP
FLUAV AG NPH QL: SIGNIFICANT CHANGE UP
FLUBV AG NPH QL: SIGNIFICANT CHANGE UP
FLUBV AG NPH QL: SIGNIFICANT CHANGE UP
GAS PNL BLDV: 136 MMOL/L — SIGNIFICANT CHANGE UP (ref 136–145)
GAS PNL BLDV: 136 MMOL/L — SIGNIFICANT CHANGE UP (ref 136–145)
GAS PNL BLDV: SIGNIFICANT CHANGE UP
GAS PNL BLDV: SIGNIFICANT CHANGE UP
GIANT PLATELETS BLD QL SMEAR: PRESENT — SIGNIFICANT CHANGE UP
GIANT PLATELETS BLD QL SMEAR: PRESENT — SIGNIFICANT CHANGE UP
GLUCOSE BLDC GLUCOMTR-MCNC: 153 MG/DL — HIGH (ref 70–99)
GLUCOSE BLDC GLUCOMTR-MCNC: 153 MG/DL — HIGH (ref 70–99)
GLUCOSE SERPL-MCNC: 146 MG/DL — HIGH (ref 70–99)
GLUCOSE SERPL-MCNC: 146 MG/DL — HIGH (ref 70–99)
HCO3 BLDV-SCNC: 29 MMOL/L — SIGNIFICANT CHANGE UP (ref 22–29)
HCO3 BLDV-SCNC: 29 MMOL/L — SIGNIFICANT CHANGE UP (ref 22–29)
HCT VFR BLD CALC: 34.7 % — LOW (ref 37–47)
HCT VFR BLD CALC: 34.7 % — LOW (ref 37–47)
HCT VFR BLDA CALC: 31 % — LOW (ref 39–51)
HCT VFR BLDA CALC: 31 % — LOW (ref 39–51)
HGB BLD CALC-MCNC: 10.2 G/DL — LOW (ref 12.6–17.4)
HGB BLD CALC-MCNC: 10.2 G/DL — LOW (ref 12.6–17.4)
HGB BLD-MCNC: 10.4 G/DL — LOW (ref 12–16)
HGB BLD-MCNC: 10.4 G/DL — LOW (ref 12–16)
LACTATE BLDV-MCNC: 4.6 MMOL/L — CRITICAL HIGH (ref 0.5–2)
LACTATE BLDV-MCNC: 4.6 MMOL/L — CRITICAL HIGH (ref 0.5–2)
LYMPHOCYTES # BLD AUTO: 0.54 K/UL — LOW (ref 1.2–3.4)
LYMPHOCYTES # BLD AUTO: 0.54 K/UL — LOW (ref 1.2–3.4)
LYMPHOCYTES # BLD AUTO: 8 % — LOW (ref 20.5–51.1)
LYMPHOCYTES # BLD AUTO: 8 % — LOW (ref 20.5–51.1)
MACROCYTES BLD QL: SLIGHT — SIGNIFICANT CHANGE UP
MACROCYTES BLD QL: SLIGHT — SIGNIFICANT CHANGE UP
MANUAL SMEAR VERIFICATION: SIGNIFICANT CHANGE UP
MANUAL SMEAR VERIFICATION: SIGNIFICANT CHANGE UP
MCHC RBC-ENTMCNC: 27 PG — SIGNIFICANT CHANGE UP (ref 27–31)
MCHC RBC-ENTMCNC: 27 PG — SIGNIFICANT CHANGE UP (ref 27–31)
MCHC RBC-ENTMCNC: 30 G/DL — LOW (ref 32–37)
MCHC RBC-ENTMCNC: 30 G/DL — LOW (ref 32–37)
MCV RBC AUTO: 90.1 FL — SIGNIFICANT CHANGE UP (ref 81–99)
MCV RBC AUTO: 90.1 FL — SIGNIFICANT CHANGE UP (ref 81–99)
METAMYELOCYTES # FLD: 0.9 % — HIGH (ref 0–0)
METAMYELOCYTES # FLD: 0.9 % — HIGH (ref 0–0)
MONOCYTES # BLD AUTO: 0.3 K/UL — SIGNIFICANT CHANGE UP (ref 0.1–0.6)
MONOCYTES # BLD AUTO: 0.3 K/UL — SIGNIFICANT CHANGE UP (ref 0.1–0.6)
MONOCYTES NFR BLD AUTO: 4.4 % — SIGNIFICANT CHANGE UP (ref 1.7–9.3)
MONOCYTES NFR BLD AUTO: 4.4 % — SIGNIFICANT CHANGE UP (ref 1.7–9.3)
MYELOCYTES NFR BLD: 3.5 % — HIGH (ref 0–0)
MYELOCYTES NFR BLD: 3.5 % — HIGH (ref 0–0)
NEUTROPHILS # BLD AUTO: 5.62 K/UL — SIGNIFICANT CHANGE UP (ref 1.4–6.5)
NEUTROPHILS # BLD AUTO: 5.62 K/UL — SIGNIFICANT CHANGE UP (ref 1.4–6.5)
NEUTROPHILS NFR BLD AUTO: 82.3 % — HIGH (ref 42.2–75.2)
NEUTROPHILS NFR BLD AUTO: 82.3 % — HIGH (ref 42.2–75.2)
NEUTS BAND # BLD: 0.9 % — SIGNIFICANT CHANGE UP (ref 0–6)
NEUTS BAND # BLD: 0.9 % — SIGNIFICANT CHANGE UP (ref 0–6)
NRBC # BLD: 1 /100 — HIGH (ref 0–0)
NRBC # BLD: 1 /100 — HIGH (ref 0–0)
NRBC # BLD: SIGNIFICANT CHANGE UP /100 WBCS (ref 0–0)
NRBC # BLD: SIGNIFICANT CHANGE UP /100 WBCS (ref 0–0)
NT-PROBNP SERPL-SCNC: HIGH PG/ML (ref 0–300)
NT-PROBNP SERPL-SCNC: HIGH PG/ML (ref 0–300)
PCO2 BLDV: 55 MMHG — HIGH (ref 39–42)
PCO2 BLDV: 55 MMHG — HIGH (ref 39–42)
PH BLDV: 7.33 — SIGNIFICANT CHANGE UP (ref 7.32–7.43)
PH BLDV: 7.33 — SIGNIFICANT CHANGE UP (ref 7.32–7.43)
PLAT MORPH BLD: NORMAL — SIGNIFICANT CHANGE UP
PLAT MORPH BLD: NORMAL — SIGNIFICANT CHANGE UP
PLATELET # BLD AUTO: 184 K/UL — SIGNIFICANT CHANGE UP (ref 130–400)
PLATELET # BLD AUTO: 184 K/UL — SIGNIFICANT CHANGE UP (ref 130–400)
PMV BLD: 10.9 FL — HIGH (ref 7.4–10.4)
PMV BLD: 10.9 FL — HIGH (ref 7.4–10.4)
PO2 BLDV: 55 MMHG — SIGNIFICANT CHANGE UP
PO2 BLDV: 55 MMHG — SIGNIFICANT CHANGE UP
POIKILOCYTOSIS BLD QL AUTO: SIGNIFICANT CHANGE UP
POIKILOCYTOSIS BLD QL AUTO: SIGNIFICANT CHANGE UP
POLYCHROMASIA BLD QL SMEAR: SLIGHT — SIGNIFICANT CHANGE UP
POLYCHROMASIA BLD QL SMEAR: SLIGHT — SIGNIFICANT CHANGE UP
POTASSIUM BLDV-SCNC: 5.3 MMOL/L — HIGH (ref 3.5–5.1)
POTASSIUM BLDV-SCNC: 5.3 MMOL/L — HIGH (ref 3.5–5.1)
POTASSIUM SERPL-MCNC: 5.5 MMOL/L — HIGH (ref 3.5–5)
POTASSIUM SERPL-MCNC: 5.5 MMOL/L — HIGH (ref 3.5–5)
POTASSIUM SERPL-SCNC: 5.5 MMOL/L — HIGH (ref 3.5–5)
POTASSIUM SERPL-SCNC: 5.5 MMOL/L — HIGH (ref 3.5–5)
PROT SERPL-MCNC: 7.8 G/DL — SIGNIFICANT CHANGE UP (ref 6–8)
PROT SERPL-MCNC: 7.8 G/DL — SIGNIFICANT CHANGE UP (ref 6–8)
RBC # BLD: 3.85 M/UL — LOW (ref 4.2–5.4)
RBC # BLD: 3.85 M/UL — LOW (ref 4.2–5.4)
RBC # FLD: 21.5 % — HIGH (ref 11.5–14.5)
RBC # FLD: 21.5 % — HIGH (ref 11.5–14.5)
RBC BLD AUTO: ABNORMAL
RBC BLD AUTO: ABNORMAL
RSV RNA NPH QL NAA+NON-PROBE: SIGNIFICANT CHANGE UP
RSV RNA NPH QL NAA+NON-PROBE: SIGNIFICANT CHANGE UP
SAO2 % BLDV: 83.2 % — SIGNIFICANT CHANGE UP
SAO2 % BLDV: 83.2 % — SIGNIFICANT CHANGE UP
SARS-COV-2 RNA SPEC QL NAA+PROBE: SIGNIFICANT CHANGE UP
SARS-COV-2 RNA SPEC QL NAA+PROBE: SIGNIFICANT CHANGE UP
SCHISTOCYTES BLD QL AUTO: SLIGHT — SIGNIFICANT CHANGE UP
SCHISTOCYTES BLD QL AUTO: SLIGHT — SIGNIFICANT CHANGE UP
SODIUM SERPL-SCNC: 139 MMOL/L — SIGNIFICANT CHANGE UP (ref 135–146)
SODIUM SERPL-SCNC: 139 MMOL/L — SIGNIFICANT CHANGE UP (ref 135–146)
TARGETS BLD QL SMEAR: SLIGHT — SIGNIFICANT CHANGE UP
TARGETS BLD QL SMEAR: SLIGHT — SIGNIFICANT CHANGE UP
TROPONIN T SERPL-MCNC: 0.06 NG/ML — CRITICAL HIGH
TROPONIN T SERPL-MCNC: 0.06 NG/ML — CRITICAL HIGH
WBC # BLD: 6.75 K/UL — SIGNIFICANT CHANGE UP (ref 4.8–10.8)
WBC # BLD: 6.75 K/UL — SIGNIFICANT CHANGE UP (ref 4.8–10.8)
WBC # FLD AUTO: 6.75 K/UL — SIGNIFICANT CHANGE UP (ref 4.8–10.8)
WBC # FLD AUTO: 6.75 K/UL — SIGNIFICANT CHANGE UP (ref 4.8–10.8)

## 2023-11-10 PROCEDURE — 71045 X-RAY EXAM CHEST 1 VIEW: CPT

## 2023-11-10 PROCEDURE — 36415 COLL VENOUS BLD VENIPUNCTURE: CPT

## 2023-11-10 PROCEDURE — 82330 ASSAY OF CALCIUM: CPT

## 2023-11-10 PROCEDURE — 86901 BLOOD TYPING SEROLOGIC RH(D): CPT

## 2023-11-10 PROCEDURE — 93925 LOWER EXTREMITY STUDY: CPT

## 2023-11-10 PROCEDURE — 83605 ASSAY OF LACTIC ACID: CPT

## 2023-11-10 PROCEDURE — 85014 HEMATOCRIT: CPT

## 2023-11-10 PROCEDURE — 92526 ORAL FUNCTION THERAPY: CPT | Mod: GN

## 2023-11-10 PROCEDURE — 84295 ASSAY OF SERUM SODIUM: CPT

## 2023-11-10 PROCEDURE — 86900 BLOOD TYPING SEROLOGIC ABO: CPT

## 2023-11-10 PROCEDURE — 84132 ASSAY OF SERUM POTASSIUM: CPT

## 2023-11-10 PROCEDURE — 82728 ASSAY OF FERRITIN: CPT

## 2023-11-10 PROCEDURE — 80053 COMPREHEN METABOLIC PANEL: CPT

## 2023-11-10 PROCEDURE — 85025 COMPLETE CBC W/AUTO DIFF WBC: CPT

## 2023-11-10 PROCEDURE — 84443 ASSAY THYROID STIM HORMONE: CPT

## 2023-11-10 PROCEDURE — 82962 GLUCOSE BLOOD TEST: CPT

## 2023-11-10 PROCEDURE — 82803 BLOOD GASES ANY COMBINATION: CPT

## 2023-11-10 PROCEDURE — 86850 RBC ANTIBODY SCREEN: CPT

## 2023-11-10 PROCEDURE — 83540 ASSAY OF IRON: CPT

## 2023-11-10 PROCEDURE — 84439 ASSAY OF FREE THYROXINE: CPT

## 2023-11-10 PROCEDURE — 85018 HEMOGLOBIN: CPT

## 2023-11-10 PROCEDURE — 92612 ENDOSCOPY SWALLOW (FEES) VID: CPT | Mod: GN

## 2023-11-10 PROCEDURE — 76705 ECHO EXAM OF ABDOMEN: CPT | Mod: 26

## 2023-11-10 PROCEDURE — 92610 EVALUATE SWALLOWING FUNCTION: CPT | Mod: GN

## 2023-11-10 PROCEDURE — 83036 HEMOGLOBIN GLYCOSYLATED A1C: CPT

## 2023-11-10 PROCEDURE — 94660 CPAP INITIATION&MGMT: CPT

## 2023-11-10 PROCEDURE — 84100 ASSAY OF PHOSPHORUS: CPT

## 2023-11-10 PROCEDURE — 71045 X-RAY EXAM CHEST 1 VIEW: CPT | Mod: 26

## 2023-11-10 PROCEDURE — 85027 COMPLETE CBC AUTOMATED: CPT

## 2023-11-10 PROCEDURE — 83735 ASSAY OF MAGNESIUM: CPT

## 2023-11-10 PROCEDURE — 99291 CRITICAL CARE FIRST HOUR: CPT

## 2023-11-10 PROCEDURE — 83550 IRON BINDING TEST: CPT

## 2023-11-10 PROCEDURE — 94640 AIRWAY INHALATION TREATMENT: CPT

## 2023-11-10 PROCEDURE — 93005 ELECTROCARDIOGRAM TRACING: CPT

## 2023-11-10 PROCEDURE — C9113: CPT

## 2023-11-10 PROCEDURE — 80048 BASIC METABOLIC PNL TOTAL CA: CPT

## 2023-11-10 PROCEDURE — 93970 EXTREMITY STUDY: CPT | Mod: 26

## 2023-11-10 PROCEDURE — 97162 PT EVAL MOD COMPLEX 30 MIN: CPT | Mod: GP

## 2023-11-10 PROCEDURE — 93306 TTE W/DOPPLER COMPLETE: CPT

## 2023-11-10 RX ORDER — LEVOTHYROXINE SODIUM 125 MCG
25 TABLET ORAL DAILY
Refills: 0 | Status: DISCONTINUED | OUTPATIENT
Start: 2023-11-10 | End: 2023-11-19

## 2023-11-10 RX ORDER — PANTOPRAZOLE SODIUM 20 MG/1
40 TABLET, DELAYED RELEASE ORAL DAILY
Refills: 0 | Status: DISCONTINUED | OUTPATIENT
Start: 2023-11-10 | End: 2023-11-13

## 2023-11-10 RX ORDER — GLUCAGON INJECTION, SOLUTION 0.5 MG/.1ML
1 INJECTION, SOLUTION SUBCUTANEOUS ONCE
Refills: 0 | Status: DISCONTINUED | OUTPATIENT
Start: 2023-11-10 | End: 2023-11-13

## 2023-11-10 RX ORDER — INSULIN LISPRO 100/ML
VIAL (ML) SUBCUTANEOUS
Refills: 0 | Status: DISCONTINUED | OUTPATIENT
Start: 2023-11-10 | End: 2023-11-19

## 2023-11-10 RX ORDER — CEFAZOLIN SODIUM 1 G
1000 VIAL (EA) INJECTION EVERY 12 HOURS
Refills: 0 | Status: DISCONTINUED | OUTPATIENT
Start: 2023-11-10 | End: 2023-11-15

## 2023-11-10 RX ORDER — BUDESONIDE AND FORMOTEROL FUMARATE DIHYDRATE 160; 4.5 UG/1; UG/1
2 AEROSOL RESPIRATORY (INHALATION)
Refills: 0 | Status: DISCONTINUED | OUTPATIENT
Start: 2023-11-10 | End: 2023-11-19

## 2023-11-10 RX ORDER — BUMETANIDE 0.25 MG/ML
1 INJECTION INTRAMUSCULAR; INTRAVENOUS
Qty: 20 | Refills: 0 | Status: DISCONTINUED | OUTPATIENT
Start: 2023-11-10 | End: 2023-11-12

## 2023-11-10 RX ORDER — CLOPIDOGREL BISULFATE 75 MG/1
75 TABLET, FILM COATED ORAL DAILY
Refills: 0 | Status: DISCONTINUED | OUTPATIENT
Start: 2023-11-10 | End: 2023-11-19

## 2023-11-10 RX ORDER — FUROSEMIDE 40 MG
40 TABLET ORAL ONCE
Refills: 0 | Status: COMPLETED | OUTPATIENT
Start: 2023-11-10 | End: 2023-11-10

## 2023-11-10 RX ORDER — ATORVASTATIN CALCIUM 80 MG/1
40 TABLET, FILM COATED ORAL AT BEDTIME
Refills: 0 | Status: DISCONTINUED | OUTPATIENT
Start: 2023-11-10 | End: 2023-11-19

## 2023-11-10 RX ORDER — VANCOMYCIN HCL 1 G
1000 VIAL (EA) INTRAVENOUS ONCE
Refills: 0 | Status: COMPLETED | OUTPATIENT
Start: 2023-11-10 | End: 2023-11-10

## 2023-11-10 RX ORDER — DOBUTAMINE HCL 250MG/20ML
2.5 VIAL (ML) INTRAVENOUS
Qty: 500 | Refills: 0 | Status: DISCONTINUED | OUTPATIENT
Start: 2023-11-10 | End: 2023-11-11

## 2023-11-10 RX ORDER — DEXTROSE 50 % IN WATER 50 %
25 SYRINGE (ML) INTRAVENOUS ONCE
Refills: 0 | Status: DISCONTINUED | OUTPATIENT
Start: 2023-11-10 | End: 2023-11-13

## 2023-11-10 RX ORDER — SODIUM CHLORIDE 9 MG/ML
1000 INJECTION, SOLUTION INTRAVENOUS
Refills: 0 | Status: DISCONTINUED | OUTPATIENT
Start: 2023-11-10 | End: 2023-11-19

## 2023-11-10 RX ORDER — DEXTROSE 50 % IN WATER 50 %
15 SYRINGE (ML) INTRAVENOUS ONCE
Refills: 0 | Status: DISCONTINUED | OUTPATIENT
Start: 2023-11-10 | End: 2023-11-13

## 2023-11-10 RX ORDER — CEFEPIME 1 G/1
2000 INJECTION, POWDER, FOR SOLUTION INTRAMUSCULAR; INTRAVENOUS ONCE
Refills: 0 | Status: COMPLETED | OUTPATIENT
Start: 2023-11-10 | End: 2023-11-10

## 2023-11-10 RX ORDER — BUMETANIDE 0.25 MG/ML
2 INJECTION INTRAMUSCULAR; INTRAVENOUS
Refills: 0 | Status: DISCONTINUED | OUTPATIENT
Start: 2023-11-10 | End: 2023-11-10

## 2023-11-10 RX ORDER — IPRATROPIUM/ALBUTEROL SULFATE 18-103MCG
3 AEROSOL WITH ADAPTER (GRAM) INHALATION EVERY 6 HOURS
Refills: 0 | Status: DISCONTINUED | OUTPATIENT
Start: 2023-11-10 | End: 2023-11-17

## 2023-11-10 RX ORDER — BUMETANIDE 0.25 MG/ML
2 INJECTION INTRAMUSCULAR; INTRAVENOUS ONCE
Refills: 0 | Status: COMPLETED | OUTPATIENT
Start: 2023-11-10 | End: 2023-11-10

## 2023-11-10 RX ORDER — HEPARIN SODIUM 5000 [USP'U]/ML
5000 INJECTION INTRAVENOUS; SUBCUTANEOUS EVERY 12 HOURS
Refills: 0 | Status: DISCONTINUED | OUTPATIENT
Start: 2023-11-10 | End: 2023-11-19

## 2023-11-10 RX ORDER — BUMETANIDE 0.25 MG/ML
2 INJECTION INTRAMUSCULAR; INTRAVENOUS
Refills: 0 | Status: DISCONTINUED | OUTPATIENT
Start: 2023-11-10 | End: 2023-11-11

## 2023-11-10 RX ADMIN — Medication 250 MILLIGRAM(S): at 15:40

## 2023-11-10 RX ADMIN — CEFEPIME 100 MILLIGRAM(S): 1 INJECTION, POWDER, FOR SOLUTION INTRAMUSCULAR; INTRAVENOUS at 15:41

## 2023-11-10 RX ADMIN — Medication 40 MILLIGRAM(S): at 16:56

## 2023-11-10 RX ADMIN — BUMETANIDE 2 MILLIGRAM(S): 0.25 INJECTION INTRAMUSCULAR; INTRAVENOUS at 20:22

## 2023-11-10 NOTE — ED PROVIDER NOTE - CLINICAL SUMMARY MEDICAL DECISION MAKING FREE TEXT BOX
in ed, labs significant for elevated bun c/f chf exacerbation  cr elevated c/f cardiorenal syndrome  lfts elevated, ruq sono not suggestive of cholecystitis/choledocolithiasis, lfts more likely from hepatic congestion  LLE c/f cellulitis- abx started  wob worsened- bipap started  case d/w icu and cards- admit to ccu for decompensated hf

## 2023-11-10 NOTE — ED PROVIDER NOTE - SEVERE SEPSIS CRITERIA MET YN (MLM)
?continue zithromax  Plaquenil  ID FU  fu cxr
Check oximetry    ID FU  fu cxr
antibiotics  fu cxr
antibiotics  fu cxr
continue zithromax    ID FU  fu cxr
continue zithromax    ID FU  fu cxr
continue zithromax  Plaquenil  ID FU  fu cxr
Sepsis Criteria were met:

## 2023-11-10 NOTE — H&P ADULT - HISTORY OF PRESENT ILLNESS
Patient is a 58-year-old woman with a history of COPD on 4 L nasal cannula, CORNELIUS on CPAP, HFrEF (19% on TTE 02/2022, s/p ICD, pulmonary HTN, CAD s/p PCI to RCA), CKD (baseline Cr around 1.8), hypertension, dyslipidemia, 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  Patient has a home health aide, who called 911 today. Patient denies any fever or chills, cough, chest pain, dyspnea, abdominal pain, vomiting, diarrhea, new rash, any pain anywhere. Patient states that she has been having no appetite or energy over the past 5 days.    In the ED vitals stable  VBGs showed pCO2 of 55  BNP 30k  Trop 0.06  Cr 3.0  LA 4.6    Admitted to CCU for CHF exacerbation plus cardiorenal   Patient is a 58-year-old woman with a history of COPD on 4 L nasal cannula, CORNELIUS on CPAP, Hypothyrodism, HFrEF (19% on TTE 02/2022, s/p ICD, pulmonary HTN, CAD s/p PCI to RCA), CKD (baseline Cr around 1.8), hypertension, dyslipidemia, 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  Patient has a home health aide, who called 911 today. Patient denies any fever or chills, cough, chest pain, dyspnea, abdominal pain, vomiting, diarrhea, new rash, any pain anywhere. Patient states that she has been having no appetite or energy over the past 5 days.    In the ED vitals stable  VBGs showed pCO2 of 55  BNP 30k  Trop 0.06  Cr 3.0  LA 4.6    Admitted to CCU for CHF exacerbation plus cardiorenal

## 2023-11-10 NOTE — ED PROVIDER NOTE - ATTENDING CONTRIBUTION TO CARE
58 year old female with PMH of COPD on 4 L home O2, CORNELIUS on CPAP, HFrEF (19% on TTE 02/2022, s/p ICD, pulmonary HTN, CAD s/p PCI to RCA)1, HTN, HLD, CVA with residual LLE weakness, DM,  pt presents for eval of weakness. pt states she has not eaten for the past 5 days. no f/n/v/d/ap/dysuria/cp/sob/trauma.  pt has HHA who called because of appearance.      vss  gen- NAD, awake, alert, mm dry   card-rrr  lungs- no resp distress, decreased breath sounds b/l  abd-sntnd, no guarding or rebound  neuro- full str/sensation, cn ii-xii grossly intact, normal coordination  LE- b/l LE edema to legs/thigh 58 year old female with PMH of COPD on 4 L home O2, CORNELIUS on CPAP, HFrEF (19% on TTE 02/2022, s/p ICD, pulmonary HTN, CAD s/p PCI to RCA)1, HTN, HLD, CVA with residual LLE weakness, DM,  pt presents for eval of weakness. pt states she has not eaten for the past 5 days. no f/n/v/d/ap/dysuria/cp/sob/trauma.  pt has HHA who called because of appearance.      vss  gen- NAD, awake, alert, mm dry   card-rrr  lungs- no resp distress, decreased breath sounds b/l  abd-sntnd, no guarding or rebound  neuro- full str/sensation, cn ii-xii grossly intact, normal coordination  LE- marked b/l LE edema to legs/thigh, L calf erythema, no crepitus

## 2023-11-10 NOTE — ED PROVIDER NOTE - OBJECTIVE STATEMENT
Patient is a 58-year-old woman with a history of COPD on 4 L nasal cannula, CORNELIUS on CPAP, HFrEF status post AICD, CAD, hypertension, dyslipidemia, 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  Patient has a home health aide, who called 911 today.  Patient denies any fever or chills, cough, chest pain, dyspnea, abdominal pain, vomiting, diarrhea, new rash, any pain anywhere.  Patient states that she has been having no appetite or energy over the past 5 days.

## 2023-11-10 NOTE — ED PROVIDER NOTE - CONSIDERATION OF ADMISSION OBSERVATION
pt requires admission for resp support, treatment, monitoring Consideration of Admission/Observation

## 2023-11-10 NOTE — ED ADULT TRIAGE NOTE - CHIEF COMPLAINT QUOTE
BIBEMS from home c/o generalized weakness and decreased PO intake x5 days.  en route. on 5LNC at baseline for COPD.

## 2023-11-10 NOTE — H&P ADULT - NSHPPHYSICALEXAM_GEN_ALL_CORE
PHYSICAL EXAM:  GENERAL: In moderate distress  PSYCH: AAOx3  CHEST/LUNG: Diffuse crackles  HEART: Regular rate and rhythm; No murmurs;   ABDOMEN: Soft, Nontender, Nondistended; Bowel sounds present; No guarding  EXTREMITIES: Erythema on left LE, b/l edema

## 2023-11-10 NOTE — ED PROVIDER NOTE - PHYSICAL EXAMINATION
_  Vital signs reviewed  GENERAL: +Chronically ill appearing, not in distress  SKIN: Warm, dry; +anasarca   HEAD & NECK: NCAT, supple neck  EYES: EOMI, PER B/L, no conjunctival pallor  ENT: +Dry MM  CARD: RRR, S1, S2; no murmurs, no rubs, no gallops  RESP: Normal respiratory effort, +decreased breath sounds diffusely   ABD: Soft, ND, NT, no rebound, no guarding  EXT: Pulses palpable distally; +B/L pitting edema  NEUROMSK: Moving all extremities with purpose   PSYCH: +Drowsy, but awakens to voice, cooperative, appropriate _  Vital signs reviewed  GENERAL: +Chronically ill appearing, not in distress  SKIN: Warm, dry; +anasarca; +RLE calf with erythema and warmth, but no crepitus  HEAD & NECK: NCAT, supple neck  EYES: EOMI, PER B/L, no conjunctival pallor  ENT: +Dry MM  CARD: RRR, S1, S2; no murmurs, no rubs, no gallops  RESP: Normal respiratory effort, +decreased breath sounds diffusely   ABD: Soft, ND, NT, no rebound, no guarding  EXT: Pulses palpable distally; +B/L pitting edema  NEUROMSK: Moving all extremities with purpose   PSYCH: +Drowsy, but awakens to voice, cooperative, appropriate

## 2023-11-10 NOTE — ED ADULT NURSE NOTE - NSFALLHARMRISKINTERV_ED_ALL_ED
Assistance OOB with selected safe patient handling equipment if applicable/Assistance with ambulation/Communicate risk of Fall with Harm to all staff, patient, and family/Monitor gait and stability/Monitor for mental status changes and reorient to person, place, and time, as needed/Provide visual cue: red socks, yellow wristband, yellow gown, etc/Reinforce activity limits and safety measures with patient and family/Review medications for side effects contributing to fall risk/Use of alarms - bed, stretcher, chair and/or video monitoring/Bed in lowest position, wheels locked, appropriate side rails in place/Call bell, personal items and telephone in reach/Instruct patient to call for assistance before getting out of bed/chair/stretcher/Non-slip footwear applied when patient is off stretcher/[place] to call system/Physically safe environment - no spills, clutter or unnecessary equipment/Purposeful Proactive Rounding/Room/bathroom lighting operational, light cord in reach

## 2023-11-10 NOTE — ED PROVIDER NOTE - CCCP TRG CHIEF CMPLNT
Reason For Visit  LIVIER ZAIDI is an established patient here today for a hospital follow-up Fell of ladder 11/1/18. Patient presents for f/u from hospital - patient fell off a ladder while at work on 11/1/18 - Xrays at ER at [facility] showed no broken bones- patient was given RX for Norco 5/325 - current pain scale rating of the back and ribs is 9.5 per patient.   :  services not used.   A chaperone is not applicable. He is unaccompanied.        Quality    Adult Wellness CI no tobacco use, does not have feelings of hopelessness (PHQ-2) and no Anhedonia (PHQ-2).      History of Present Illness  Patient is a 64 year old  male here with RIGHT-sided chest pain S/P accidental fall from ladder at work on 11/1/2018; CXR done on 11/5/2018 at [facility] did not reveal any rib fractures.   Patient requests letter to be excused from work 11/5/2018 through 11/9/2018.           Review of Systems    All other systems reviewed and negative.      Allergies  Penicillins  erythromycin  Sulfa Drugs  Shellfish    Current Meds   1. Advair -21 MCG/ACT Inhalation Aerosol; INHALE 2 PUFFS BY MOUTH TWICE A   DAY;   Therapy: 20May2018 to (Evaluate:[id number])  Requested for: 20May2018; Last   Rx:20May2018 Ordered   2. Azelastine HCl - 0.1 % Nasal Solution; USE 1 TO 2 SPRAYS IN EACH NOSTRIL TWICE   DAILY AS NEEDED;   Therapy: [id number] to (Evaluate:22Mar2018)  Requested for: 06Mar2018; Last   Rx:06Mar2018 Ordered   3. Calcium 600+D3 600-400 MG-UNIT Oral Tablet; TAKE 1 TABLET DAILY;   Therapy: (Recorded:[id number]) to Recorded   4. Controlled Substance Agreement;   Therapy: 08Jun2018 to Recorded   5. Fluticasone Propionate 50 MCG/ACT Nasal Suspension; USE 2 SPRAYS IN EACH   NOSTRIL ONCE DAILY;   Therapy: (Recorded:46Bkp2290) to Recorded   6. Montelukast Sodium 10 MG Oral Tablet; TAKE 1 TABLET BY MOUTH DAILY;   Therapy: 06May2018 to (Evaluate:52Vvm7823)  Requested for: 06May2018; Last    Rx:[id number] Ordered   7. Multi-Vitamin Oral Tablet;   Therapy: (Recorded:07Mar2018) to Recorded   8. Tamsulosin HCl - 0.4 MG Oral Capsule; Take 1 capsule by mouth  daily 30 minutes after    dinner per ;   Therapy: (Recorded:70Xkr1109) to Recorded   9. TraMADol HCl - 50 MG Oral Tablet; TAKE 1 TABLET 3 TIMES DAILY  Requested for:   04Oct2018; Last Rx:04Oct2018 Ordered   10. Ventolin  (90 Base) MCG/ACT Inhalation Aerosol Solution; INHALE 2 PUFFS    EVERY 4 HOURS AS NEEDED;    Therapy: 46Upz9040 to (Last Rx:27Xjv4879)  Requested for: 33Aqd9345 Ordered    Active Problems  Admission for therapeutic drug monitoring (Z51.81)  Bulge of lumbar disc without myelopathy (M51.26)  Calculus of kidney (N20.0)  Chronic bilateral low back pain with bilateral sciatica (M54.42,M54.41,G89.29)  Controlled substance agreement signed (Z79.899)  DDD (degenerative disc disease), lumbar (M51.36)  Elevated fasting glucose (R73.01)  Elevated PSA (R97.20)   · Dr Bowden  History of pulmonary embolism (Z86.711)   · post op after knee scope 2007, Prothrombin gene mutation found.  Lumbar radiculopathy, chronic (M54.16)  Moderate persistent asthma without complication (J45.40)  Need for influenza vaccination (Z23)  Need for shingles vaccine (Z23)  Prothrombin gene mutation (D68.52)   · heterozygous, w/ h/o post op DVT/ PE 2007  Seasonal allergic rhinitis due to pollen (J30.1)  History of Ulcerative chronic pancolitis, with abscess (K51.014)   · s/p colectomy w/ ileoanal reservior 1992 - Dr Montague  Urinary stream slowing (R39.198)  Vitamin B12 deficiency (E53.8)    Past Medical History  History of Anal fistula (K60.3)  History of backache (Z87.39)  History of bronchitis (Z87.09)  History of gastric ulcer (Z87.19)  History of leukocytosis (Z86.2)  History of memory loss (Z87.898)   · improved on B12 and off gabapentin.  History of pneumonia (Z87.01)  History of pulmonary embolism (Z86.711)   · post op after knee scope 2007, Prothrombin  gene mutation found.  History of Hypernatremia (E87.0)  History of Leukemoid reaction (D72.823)  History of Need for hepatitis C screening test (Z11.59)  History of Perirectal abscess (K61.1)  History of Rectal abscess (K61.1)  History of Rectal pain (K62.89)  History of Ulcerative chronic pancolitis, with abscess (K51.014)   · s/p colectomy w/ ileoanal reservior 1992 - Dr Montague    Surgical History  History of Hernia Repair   · R inguinal, 2010  History of Knee Arthroscopy (Therapeutic)   · L, 2007  History of Perirectal Abscess I&D   · 11/2016  History of Surgery Vas Deferens Vasectomy   · 1980  History of Total Colectomy   · for UC...1992    Family History  Mother   Family history of breast cancer (Z80.3)  Family history of hypertension (Z82.49)  Family history of lung cancer (Z80.1)  Father   Family history of prostate cancer (Z80.42)  Brother   Family history of leukemia (Z80.6)    Social History  Exercise: Walking   · daily  Full-time employment   · / bus company  Never a smoker  No caffeine use  No drug use  Rarely consumes alcohol (Z78.9)   · 1 monthly    Vitals  Signs   Recorded: 08Nov2018 10:32AM   Height: 6 ft 2 in  Weight: 170 lb 5 oz  BMI Calculated: 21.87  BSA Calculated: 2.03  Systolic: 128, LUE, Sitting  Diastolic: 72, LUE, Sitting  Temperature: 98.5 F, Oral  Heart Rate: 73  Respiration: 16  O2 Saturation: 98    Physical Exam  Constitutional: alert, in no acute distress and current vital signs reviewed.   Pulmonary: no respiratory distress, normal respiratory rate and effort and no accessory muscle use. breath sounds clear to auscultation bilaterally.   Cardiovascular: normal rate, no murmurs were heard, regular rhythm, normal S1 and normal S2. edema was not present in the lower extremities.      Immunizations  Influenza --- Series1: 27-Nov-2017   PCV --- Series1: 05-Aug-2015   PPSV --- Series1: 11-Oct-2016   Tdap --- Series1: 05-Aug-2015   Zoster --- Series1: 16-Jun-2017      Assessment  Chest injury, sequela (S29.9XXS)  Accidental fall (W19.XXXA)  Chest discomfort (R07.89)    Plan  Hydrocodone-Acetaminophen 5-325 MG Oral Tablet  Hydrocodone-Acetaminophen  MG Oral Tablet; TAKE 1 TABLET ONCE  EVERY 8 HOURS AS NEEDED FOR SEVERE PAIN  D DIMER QUANTITATIVE; Status:Active; Requested for:08Nov2018;   MRI CHEST WO CON; Status:Active - Perform Order; Requested for:08Nov2018;   Plans:     Plan:   1. RIGHT sided chest pain S/P accidental fall from ladder:    STAT D dimer ordered to be done now at hospital - patient instructed accordingly.  MRI chest w/o contrast ordered.   hydrocodone-acetaminophen 10 - 325 MG script printed, signed, scanned for patient.  work excuse letter typed, printed for patient.   instructed patient to also follow up with his pain management specialist Dr. Peacock.   Patient expresses understanding of the plan.      Signatures   Electronically signed by : AMY Lui; Nov 8 2018 10:35AM CST    Electronically signed by : OVIDIO PITTS M.D.; Nov 8 2018 11:11AM CST     weakness

## 2023-11-10 NOTE — ED PROVIDER NOTE - SECONDARY DIAGNOSIS.
Generalized weakness Cellulitis of right leg Acute exacerbation of congestive heart failure JEMIMA (acute kidney injury)

## 2023-11-10 NOTE — H&P ADULT - ATTENDING COMMENTS
In brief, 58-year-old woman, bedbound (? 2/2 CVA) with history of severe pHTN (WHO II/III), COPD on home O2 4L NC, mixed CM with EF 19% s/p remote PCI of the RCA and ICD, CVA with residual LE weakness, severe PAD, and DM2 presenting with acute on chronic systolic heart failure with acute on chronic hypercapnic/hypoxemic respiratory failure and JEMIMA on CKD.    Labs on presentation  - Hgb 10.4, Plt 184  - Cr 3.0 (baseline 1.8), K 5.5, alkp 205, , ALT 99  - Venous lactate 3.2  - Trop 0.06, pBNP 33K  - ABG pH 7.37, pCO2 52, HCO3 30, SaO2 98.5 on FiO2 40%    Was started on BiPAP, , and IV bumex.  This morning has improvement in hemodynamics with 3L UOP over 24hrs.    TTE  - EF 25-30%, dyskinetic septum, G2DD, severe RV dilatation with low-normal function, mod RA, mild LA, mod-severe TR with hepatic flow reversal, IVC 2.8cm with <50% collapsibility, PASP at least 60mmHg, small pericardial effusion without tamponade physiology.  - Systolic function and measured PASP are grossly unchanged from 12/2022.    [facility] 2021 for ischemic eval and (+) NM stress  - 1vCAD, s/p successful PCI of a 90% occluded pRCA    Arterial Doppler 1/2023  - No flow is visualized in distal right SFA and proximal and mid left SFA.  - Reconstitution in the popliteal artery bilaterally with diminished flow   in tibial arteries.    Exam notable for exquisite LE tenderness, L>R with pitting edema and warmth L>R.    Plan:  - Unclear precipitating factor for patient's acute on chronic systolic heart failure. Consideration for infection (LLE cellulitis), progression of pHTN, medication non-adherence, dietary indiscretion.  - Empiric antibiotics for possible LLE cellulitis  - Bumex IV, decrease to 1mg BID, monitor UOP and IVC.  - Continue  for now, consider d/c in the AM. Does not have central access.  - Repeat LE arterial doppler  - Vascular consult In brief, 58-year-old woman, bedbound (? 2/2 CVA) with history of severe pHTN (WHO II/III), COPD on home O2 4L NC, mixed CM with EF 19% s/p remote PCI of the RCA and ICD, CVA with residual LE weakness, severe PAD, and DM2 presenting with acute on chronic systolic heart failure with acute on chronic hypercapnic/hypoxemic respiratory failure and JEMIMA on CKD.    Labs on presentation  - Hgb 10.4, Plt 184  - Cr 3.0 (baseline 1.8), K 5.5, alkp 205, , ALT 99  - Venous lactate 3.2  - Trop 0.06, pBNP 33K  - ABG pH 7.37, pCO2 52, HCO3 30, SaO2 98.5 on FiO2 40%    Was started on BiPAP, , and IV bumex.  This morning has improvement in hemodynamics with 3L UOP over 24hrs.    TTE  - EF 25-30%, dyskinetic septum, G2DD, severe RV dilatation with low-normal function, mod RA, mild LA, mod-severe TR with hepatic flow reversal, IVC 2.8cm with <50% collapsibility, PASP at least 60mmHg, small pericardial effusion without tamponade physiology.  - Systolic function and measured PASP are grossly unchanged from 12/2022.    [facility] 2021 for ischemic eval and (+) NM stress  - 1vCAD, s/p successful PCI of a 90% occluded pRCA    Arterial Doppler 1/2023  - No flow is visualized in distal right SFA and proximal and mid left SFA.  - Reconstitution in the popliteal artery bilaterally with diminished flow   in tibial arteries.    Exam notable for exquisite LE tenderness, L>R with pitting edema and warmth L>R.    Plan:  - Unclear precipitating factor for patient's acute on chronic systolic heart failure. Consideration for infection (LLE cellulitis), progression of pHTN, medication non-adherence, dietary indiscretion.  - Empiric antibiotics for possible LLE cellulitis  - Bumex IV, decrease to 1mg BID, monitor UOP and IVC.  - Continue  for now, consider d/c in the AM. Does not have central access.  - Repeat LE arterial doppler  - Vascular consult In brief, 58-year-old woman, bedbound (? 2/2 CVA) with history of severe pHTN (WHO II/III), COPD on home O2 4L NC, mixed CM with EF 19% s/p remote PCI of the RCA and ICD, CVA with residual LE weakness, severe PAD, and DM2 presenting with acute on chronic systolic heart failure with acute on chronic hypercapnic/hypoxemic respiratory failure and JEMIMA on CKD.    Labs on presentation  - Hgb 10.4, Plt 184  - Cr 3.0 (baseline 1.8), K 5.5, alkp 205, , ALT 99  - Venous lactate 3.2  - Trop 0.06, pBNP 33K  - ABG pH 7.37, pCO2 52, HCO3 30, SaO2 98.5 on FiO2 40%    Was started on BiPAP, , and IV bumex.  This morning has improvement in hemodynamics with 3L UOP over 24hrs.    TTE  - EF 25-30%, dyskinetic septum, G2DD, severe RV dilatation with low-normal function, mod RA, mild LA, mod-severe TR with hepatic flow reversal, IVC 2.8cm with <50% collapsibility, PASP at least 60mmHg, small pericardial effusion without tamponade physiology.  - Systolic function and measured PASP are grossly unchanged from 12/2022.    [facility] 2021 for ischemic eval and (+) NM stress  - 1vCAD, s/p successful PCI of a 90% occluded pRCA    Arterial Doppler 1/2023  - No flow is visualized in distal right SFA and proximal and mid left SFA.  - Reconstitution in the popliteal artery bilaterally with diminished flow   in tibial arteries.    Exam notable for exquisite LE tenderness, L>R with pitting edema and warmth L>R.    Plan:  - Unclear precipitating factor for patient's acute on chronic systolic heart failure. Consideration for infection (LLE cellulitis), progression of pHTN, medication non-adherence, dietary indiscretion.  - Empiric antibiotics for possible LLE cellulitis  - Bumex IV, decrease to 1mg BID, monitor UOP and IVC.  - Continue  for now, consider d/c in the AM. Does not have central access.  - Repeat LE arterial doppler  - Vascular consult

## 2023-11-10 NOTE — CHART NOTE - NSCHARTNOTEFT_GEN_A_CORE
Did bedside pocus    VTI 14.4, HR 77, CO 3.4, CI 1.6  IVC dilated, non-collapsible  Start Dobutamine gtt, Bumex 2mg IVP, Start Bumex gtt  Do blandon  Will do A-line    d/w fellow

## 2023-11-10 NOTE — H&P ADULT - ASSESSMENT
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    IMPRESSION:  CHF exacerbation  Cardiorenal  COPD on home O2  CORNELIUS on CPAP  HFrEF  HTN  CKD  CAD  DL  DM  CVA    PLAN:    CNS: Avoid CNS Depressants.    HEENT: Oral care. Aspiration precautions     PULMONARY: HOB @ 45. Monitor Pulse Ox. Keep > 93%. Keep on Bipap, recheck ABGs    CARDIOVASCULAR:   - Daily Weight. Strict I&Os. Keep I < O, start Bumex 2mg twice daily    GI: GI prophylaxis. DASH/TLC, carb consistent diet    RENAL: Avoid nephrotoxic agents     INFECTIOUS DISEASE:    HEMATOLOGICAL: DVT prophylaxis (Lovenox/heparin).     ENDOCRINE: Monitor FS. HbA1C 5.3 % [01-20-23]   HbA1C 5.6 % [11-19-22]   HbA1C 6.8 % [02-07-22]       MUSCULOSKELETAL: Ambulate as tolerated     CODE STATUS:    DISPOSITION:        Patient is a 58-year-old woman with a history of COPD on 4 L nasal cannula, CORNELIUS on CPAP, HFrEF (19% on TTE 02/2022, s/p ICD, pulmonary HTN, CAD s/p PCI to RCA), CKD (baseline Cr around 1.8), dyslipidemia, CVA with residual left lower extremity weakness, bedbound, DM, brought in from private residence for evaluation of generalized weakness and decreased appetite for the past 5 days.    IMPRESSION:  CHF exacerbation  Cardiorenal  COPD on home O2  CORNELIUS on CPAP  HFrEF  HTN  CKD  CAD  DL  DM  CVA  Cellulitis vs DVT    PLAN:    CNS: Avoid CNS Depressants.    HEENT: Oral care. Aspiration precautions     PULMONARY: HOB @ 45. Monitor Pulse Ox. Keep > 93%. Keep on Bipap, recheck ABGs    CARDIOVASCULAR:   - Daily Weight. Strict I&Os. Keep I < O, start Bumex 2mg twice daily, Trend Lactate, hold antihypertensives, consider starting dobutamine if hypotensive    GI: GI prophylaxis. DASH/TLC, carb consistent diet    RENAL: Avoid nephrotoxic agents, Monitor electrolytes and creatinine     INFECTIOUS DISEASE: will start cefazolin for possible cellulitis in left LE    HEMATOLOGICAL: DVT prophylaxis (Lovenox/heparin). f/u LE duplex    ENDOCRINE: Monitor FS. Started on ISS    MUSCULOSKELETAL: Out of bed to chair    CODE STATUS: Full Code    DISPOSITION: CCU       Patient is a 58-year-old woman with a history of COPD on 4 L nasal cannula, CORNELIUS on CPAP, HFrEF (19% on TTE 02/2022, s/p ICD, pulmonary HTN, CAD s/p PCI to RCA), CKD (baseline Cr around 1.8), dyslipidemia, CVA with residual left lower extremity weakness, bedbound, DM, brought in from private residence for evaluation of generalized weakness and decreased appetite for the past 5 days.    Please complete MED RECC in AM, [facility]    IMPRESSION:  CHF exacerbation  Cardiorenal  COPD on home O2  CORNELIUS on CPAP  HFrEF  HTN  CKD  CAD  DL  DM  CVA  Hypothyrodism  Cellulitis vs DVT    PLAN:    CNS: Avoid CNS Depressants.    HEENT: Oral care. Aspiration precautions     PULMONARY: HOB @ 45. Monitor Pulse Ox. Keep > 93%. Keep on Bipap, recheck ABGs    CARDIOVASCULAR:   - Daily Weight. Strict I&Os. Keep I < O, start Bumex 2mg twice daily, Trend Lactate, hold antihypertensives, consider starting dobutamine if hypotensive    GI: GI prophylaxis. DASH/TLC, carb consistent diet    RENAL: Avoid nephrotoxic agents, Monitor electrolytes and creatinine     INFECTIOUS DISEASE: will start cefazolin for possible cellulitis in left LE    HEMATOLOGICAL: DVT prophylaxis (Lovenox/heparin). f/u LE duplex    ENDOCRINE: Monitor FS. Started on ISS    MUSCULOSKELETAL: Out of bed to chair    ENDO: c/w synthroid    CODE STATUS: Full Code    DISPOSITION: CCU

## 2023-11-10 NOTE — ED PROVIDER NOTE - CARE PLAN
Principal Discharge DX:	Generalized weakness  Secondary Diagnosis:	Generalized weakness  Secondary Diagnosis:	Cellulitis of right leg  Secondary Diagnosis:	Acute exacerbation of congestive heart failure   1 Principal Discharge DX:	Generalized weakness  Secondary Diagnosis:	Generalized weakness  Secondary Diagnosis:	Cellulitis of right leg  Secondary Diagnosis:	Acute exacerbation of congestive heart failure  Secondary Diagnosis:	JEMIMA (acute kidney injury)

## 2023-11-10 NOTE — ED ADULT NURSE REASSESSMENT NOTE - NS ED NURSE REASSESS COMMENT FT1
Received report from Prior RN, pt is Aox4 resting on bed with BIPAP on with 40% o2, no s/s of distress, IV intact vital stable.

## 2023-11-10 NOTE — H&P ADULT - NSHPLABSRESULTS_GEN_ALL_CORE
.  LABS:                         10.4   6.75  )-----------( 184      ( 10 Nov 2023 13:42 )             34.7     11-10    139  |  96<L>  |  57<H>  ----------------------------<  146<H>  5.5<H>   |  22  |  3.0<H>    Ca    9.4      10 Nov 2023 13:42    TPro  7.8  /  Alb  3.7  /  TBili  2.5<H>  /  DBili  x   /  AST  180<H>  /  ALT  99<H>  /  AlkPhos  205<H>  11-10      Urinalysis Basic - ( 10 Nov 2023 13:42 )    Color: x / Appearance: x / SG: x / pH: x  Gluc: 146 mg/dL / Ketone: x  / Bili: x / Urobili: x   Blood: x / Protein: x / Nitrite: x   Leuk Esterase: x / RBC: x / WBC x   Sq Epi: x / Non Sq Epi: x / Bacteria: x        Lactate, Blood: 3.2 mmol/L (11-10 @ 18:12)      RADIOLOGY, EKG & ADDITIONAL TESTS: Reviewed.

## 2023-11-10 NOTE — ED PROVIDER NOTE - PROGRESS NOTE DETAILS
Resident ANGELIQUE Mauro: Sepsis is suspected at this time.  Labs and culture sent.  Patient has severe heart failure, and is seen to be in anasarca, thus 30 cc/kg fluid bolus is contraindicated.  Broad-spectrum antibiotics ordered. Resident ANGELIQUE Mauro: Lasix given as patient is overloaded clinically. Resident ANGELIQUE Mauro: Duplex negative, however limited. Resident ANGELIQUE Mauro:  RUQ US with nonspecific GB wall thickening, likely congestive in nature.   Patient became tachypneic, complaining of dyspnea.  Oxygen saturation maintained.  Patient placed on BiPAP.  Evaluated by ICU, patient is a good candidate for stepdown. Resident ANGELIQUE Mauro: ICU fellow discussed case with Cardiology, patient to be managed by CCU. Endorsed to CCU resident.

## 2023-11-11 LAB
A1C WITH ESTIMATED AVERAGE GLUCOSE RESULT: 7.7 % — HIGH (ref 4–5.6)
A1C WITH ESTIMATED AVERAGE GLUCOSE RESULT: 7.7 % — HIGH (ref 4–5.6)
ALBUMIN SERPL ELPH-MCNC: 3.8 G/DL — SIGNIFICANT CHANGE UP (ref 3.5–5.2)
ALBUMIN SERPL ELPH-MCNC: 3.8 G/DL — SIGNIFICANT CHANGE UP (ref 3.5–5.2)
ALP SERPL-CCNC: 180 U/L — HIGH (ref 30–115)
ALP SERPL-CCNC: 180 U/L — HIGH (ref 30–115)
ALT FLD-CCNC: 75 U/L — HIGH (ref 0–41)
ALT FLD-CCNC: 75 U/L — HIGH (ref 0–41)
ANION GAP SERPL CALC-SCNC: 14 MMOL/L — SIGNIFICANT CHANGE UP (ref 7–14)
AST SERPL-CCNC: 112 U/L — HIGH (ref 0–41)
AST SERPL-CCNC: 112 U/L — HIGH (ref 0–41)
BASOPHILS # BLD AUTO: 0.04 K/UL — SIGNIFICANT CHANGE UP (ref 0–0.2)
BASOPHILS # BLD AUTO: 0.04 K/UL — SIGNIFICANT CHANGE UP (ref 0–0.2)
BASOPHILS NFR BLD AUTO: 0.6 % — SIGNIFICANT CHANGE UP (ref 0–1)
BASOPHILS NFR BLD AUTO: 0.6 % — SIGNIFICANT CHANGE UP (ref 0–1)
BILIRUB SERPL-MCNC: 2.3 MG/DL — HIGH (ref 0.2–1.2)
BILIRUB SERPL-MCNC: 2.3 MG/DL — HIGH (ref 0.2–1.2)
BLD GP AB SCN SERPL QL: SIGNIFICANT CHANGE UP
BLD GP AB SCN SERPL QL: SIGNIFICANT CHANGE UP
BUN SERPL-MCNC: 64 MG/DL — CRITICAL HIGH (ref 10–20)
BUN SERPL-MCNC: 64 MG/DL — CRITICAL HIGH (ref 10–20)
BUN SERPL-MCNC: 66 MG/DL — CRITICAL HIGH (ref 10–20)
BUN SERPL-MCNC: 66 MG/DL — CRITICAL HIGH (ref 10–20)
CALCIUM SERPL-MCNC: 9.2 MG/DL — SIGNIFICANT CHANGE UP (ref 8.4–10.4)
CALCIUM SERPL-MCNC: 9.2 MG/DL — SIGNIFICANT CHANGE UP (ref 8.4–10.4)
CALCIUM SERPL-MCNC: 9.8 MG/DL — SIGNIFICANT CHANGE UP (ref 8.4–10.4)
CALCIUM SERPL-MCNC: 9.8 MG/DL — SIGNIFICANT CHANGE UP (ref 8.4–10.4)
CHLORIDE SERPL-SCNC: 98 MMOL/L — SIGNIFICANT CHANGE UP (ref 98–110)
CHLORIDE SERPL-SCNC: 98 MMOL/L — SIGNIFICANT CHANGE UP (ref 98–110)
CHLORIDE SERPL-SCNC: 99 MMOL/L — SIGNIFICANT CHANGE UP (ref 98–110)
CHLORIDE SERPL-SCNC: 99 MMOL/L — SIGNIFICANT CHANGE UP (ref 98–110)
CO2 SERPL-SCNC: 29 MMOL/L — SIGNIFICANT CHANGE UP (ref 17–32)
CO2 SERPL-SCNC: 29 MMOL/L — SIGNIFICANT CHANGE UP (ref 17–32)
CO2 SERPL-SCNC: 31 MMOL/L — SIGNIFICANT CHANGE UP (ref 17–32)
CO2 SERPL-SCNC: 31 MMOL/L — SIGNIFICANT CHANGE UP (ref 17–32)
CREAT SERPL-MCNC: 2.6 MG/DL — HIGH (ref 0.7–1.5)
CREAT SERPL-MCNC: 2.6 MG/DL — HIGH (ref 0.7–1.5)
CREAT SERPL-MCNC: 2.9 MG/DL — HIGH (ref 0.7–1.5)
CREAT SERPL-MCNC: 2.9 MG/DL — HIGH (ref 0.7–1.5)
EGFR: 18 ML/MIN/1.73M2 — LOW
EGFR: 18 ML/MIN/1.73M2 — LOW
EGFR: 21 ML/MIN/1.73M2 — LOW
EGFR: 21 ML/MIN/1.73M2 — LOW
EOSINOPHIL # BLD AUTO: 0.16 K/UL — SIGNIFICANT CHANGE UP (ref 0–0.7)
EOSINOPHIL # BLD AUTO: 0.16 K/UL — SIGNIFICANT CHANGE UP (ref 0–0.7)
EOSINOPHIL NFR BLD AUTO: 2.4 % — SIGNIFICANT CHANGE UP (ref 0–8)
EOSINOPHIL NFR BLD AUTO: 2.4 % — SIGNIFICANT CHANGE UP (ref 0–8)
ESTIMATED AVERAGE GLUCOSE: 174 MG/DL — HIGH (ref 68–114)
ESTIMATED AVERAGE GLUCOSE: 174 MG/DL — HIGH (ref 68–114)
GAS PNL BLDA: SIGNIFICANT CHANGE UP
GAS PNL BLDA: SIGNIFICANT CHANGE UP
GLUCOSE BLDC GLUCOMTR-MCNC: 128 MG/DL — HIGH (ref 70–99)
GLUCOSE BLDC GLUCOMTR-MCNC: 128 MG/DL — HIGH (ref 70–99)
GLUCOSE BLDC GLUCOMTR-MCNC: 136 MG/DL — HIGH (ref 70–99)
GLUCOSE BLDC GLUCOMTR-MCNC: 136 MG/DL — HIGH (ref 70–99)
GLUCOSE BLDC GLUCOMTR-MCNC: 140 MG/DL — HIGH (ref 70–99)
GLUCOSE BLDC GLUCOMTR-MCNC: 140 MG/DL — HIGH (ref 70–99)
GLUCOSE BLDC GLUCOMTR-MCNC: 144 MG/DL — HIGH (ref 70–99)
GLUCOSE BLDC GLUCOMTR-MCNC: 144 MG/DL — HIGH (ref 70–99)
GLUCOSE SERPL-MCNC: 151 MG/DL — HIGH (ref 70–99)
GLUCOSE SERPL-MCNC: 151 MG/DL — HIGH (ref 70–99)
GLUCOSE SERPL-MCNC: 166 MG/DL — HIGH (ref 70–99)
GLUCOSE SERPL-MCNC: 166 MG/DL — HIGH (ref 70–99)
HCT VFR BLD CALC: 31.4 % — LOW (ref 37–47)
HCT VFR BLD CALC: 31.4 % — LOW (ref 37–47)
HGB BLD-MCNC: 9.5 G/DL — LOW (ref 12–16)
HGB BLD-MCNC: 9.5 G/DL — LOW (ref 12–16)
IMM GRANULOCYTES NFR BLD AUTO: 0.9 % — HIGH (ref 0.1–0.3)
IMM GRANULOCYTES NFR BLD AUTO: 0.9 % — HIGH (ref 0.1–0.3)
IRON SATN MFR SERPL: 23 % — SIGNIFICANT CHANGE UP (ref 15–50)
IRON SATN MFR SERPL: 23 % — SIGNIFICANT CHANGE UP (ref 15–50)
IRON SATN MFR SERPL: 44 UG/DL — SIGNIFICANT CHANGE UP (ref 35–150)
IRON SATN MFR SERPL: 44 UG/DL — SIGNIFICANT CHANGE UP (ref 35–150)
LACTATE SERPL-SCNC: 1.2 MMOL/L — SIGNIFICANT CHANGE UP (ref 0.7–2)
LACTATE SERPL-SCNC: 1.2 MMOL/L — SIGNIFICANT CHANGE UP (ref 0.7–2)
LACTATE SERPL-SCNC: 1.6 MMOL/L — SIGNIFICANT CHANGE UP (ref 0.7–2)
LACTATE SERPL-SCNC: 1.6 MMOL/L — SIGNIFICANT CHANGE UP (ref 0.7–2)
LYMPHOCYTES # BLD AUTO: 0.82 K/UL — LOW (ref 1.2–3.4)
LYMPHOCYTES # BLD AUTO: 0.82 K/UL — LOW (ref 1.2–3.4)
LYMPHOCYTES # BLD AUTO: 12.1 % — LOW (ref 20.5–51.1)
LYMPHOCYTES # BLD AUTO: 12.1 % — LOW (ref 20.5–51.1)
MAGNESIUM SERPL-MCNC: 1.8 MG/DL — SIGNIFICANT CHANGE UP (ref 1.8–2.4)
MAGNESIUM SERPL-MCNC: 1.8 MG/DL — SIGNIFICANT CHANGE UP (ref 1.8–2.4)
MAGNESIUM SERPL-MCNC: 2.1 MG/DL — SIGNIFICANT CHANGE UP (ref 1.8–2.4)
MAGNESIUM SERPL-MCNC: 2.1 MG/DL — SIGNIFICANT CHANGE UP (ref 1.8–2.4)
MCHC RBC-ENTMCNC: 26.5 PG — LOW (ref 27–31)
MCHC RBC-ENTMCNC: 26.5 PG — LOW (ref 27–31)
MCHC RBC-ENTMCNC: 30.3 G/DL — LOW (ref 32–37)
MCHC RBC-ENTMCNC: 30.3 G/DL — LOW (ref 32–37)
MCV RBC AUTO: 87.5 FL — SIGNIFICANT CHANGE UP (ref 81–99)
MCV RBC AUTO: 87.5 FL — SIGNIFICANT CHANGE UP (ref 81–99)
MONOCYTES # BLD AUTO: 0.65 K/UL — HIGH (ref 0.1–0.6)
MONOCYTES # BLD AUTO: 0.65 K/UL — HIGH (ref 0.1–0.6)
MONOCYTES NFR BLD AUTO: 9.6 % — HIGH (ref 1.7–9.3)
MONOCYTES NFR BLD AUTO: 9.6 % — HIGH (ref 1.7–9.3)
NEUTROPHILS # BLD AUTO: 5.06 K/UL — SIGNIFICANT CHANGE UP (ref 1.4–6.5)
NEUTROPHILS # BLD AUTO: 5.06 K/UL — SIGNIFICANT CHANGE UP (ref 1.4–6.5)
NEUTROPHILS NFR BLD AUTO: 74.4 % — SIGNIFICANT CHANGE UP (ref 42.2–75.2)
NEUTROPHILS NFR BLD AUTO: 74.4 % — SIGNIFICANT CHANGE UP (ref 42.2–75.2)
NRBC # BLD: 1 /100 WBCS — HIGH (ref 0–0)
NRBC # BLD: 1 /100 WBCS — HIGH (ref 0–0)
PHOSPHATE SERPL-MCNC: 4.2 MG/DL — SIGNIFICANT CHANGE UP (ref 2.1–4.9)
PHOSPHATE SERPL-MCNC: 4.2 MG/DL — SIGNIFICANT CHANGE UP (ref 2.1–4.9)
PLATELET # BLD AUTO: 185 K/UL — SIGNIFICANT CHANGE UP (ref 130–400)
PLATELET # BLD AUTO: 185 K/UL — SIGNIFICANT CHANGE UP (ref 130–400)
PMV BLD: 10.9 FL — HIGH (ref 7.4–10.4)
PMV BLD: 10.9 FL — HIGH (ref 7.4–10.4)
POTASSIUM SERPL-MCNC: 3.8 MMOL/L — SIGNIFICANT CHANGE UP (ref 3.5–5)
POTASSIUM SERPL-MCNC: 3.8 MMOL/L — SIGNIFICANT CHANGE UP (ref 3.5–5)
POTASSIUM SERPL-MCNC: 4.5 MMOL/L — SIGNIFICANT CHANGE UP (ref 3.5–5)
POTASSIUM SERPL-MCNC: 4.5 MMOL/L — SIGNIFICANT CHANGE UP (ref 3.5–5)
POTASSIUM SERPL-SCNC: 3.8 MMOL/L — SIGNIFICANT CHANGE UP (ref 3.5–5)
POTASSIUM SERPL-SCNC: 3.8 MMOL/L — SIGNIFICANT CHANGE UP (ref 3.5–5)
POTASSIUM SERPL-SCNC: 4.5 MMOL/L — SIGNIFICANT CHANGE UP (ref 3.5–5)
POTASSIUM SERPL-SCNC: 4.5 MMOL/L — SIGNIFICANT CHANGE UP (ref 3.5–5)
PROT SERPL-MCNC: 7.6 G/DL — SIGNIFICANT CHANGE UP (ref 6–8)
PROT SERPL-MCNC: 7.6 G/DL — SIGNIFICANT CHANGE UP (ref 6–8)
RBC # BLD: 3.59 M/UL — LOW (ref 4.2–5.4)
RBC # BLD: 3.59 M/UL — LOW (ref 4.2–5.4)
RBC # FLD: 21.4 % — HIGH (ref 11.5–14.5)
RBC # FLD: 21.4 % — HIGH (ref 11.5–14.5)
SODIUM SERPL-SCNC: 141 MMOL/L — SIGNIFICANT CHANGE UP (ref 135–146)
SODIUM SERPL-SCNC: 141 MMOL/L — SIGNIFICANT CHANGE UP (ref 135–146)
SODIUM SERPL-SCNC: 144 MMOL/L — SIGNIFICANT CHANGE UP (ref 135–146)
SODIUM SERPL-SCNC: 144 MMOL/L — SIGNIFICANT CHANGE UP (ref 135–146)
TIBC SERPL-MCNC: 188 UG/DL — LOW (ref 220–430)
TIBC SERPL-MCNC: 188 UG/DL — LOW (ref 220–430)
UIBC SERPL-MCNC: 144 UG/DL — SIGNIFICANT CHANGE UP (ref 110–370)
UIBC SERPL-MCNC: 144 UG/DL — SIGNIFICANT CHANGE UP (ref 110–370)
WBC # BLD: 6.79 K/UL — SIGNIFICANT CHANGE UP (ref 4.8–10.8)
WBC # BLD: 6.79 K/UL — SIGNIFICANT CHANGE UP (ref 4.8–10.8)
WBC # FLD AUTO: 6.79 K/UL — SIGNIFICANT CHANGE UP (ref 4.8–10.8)
WBC # FLD AUTO: 6.79 K/UL — SIGNIFICANT CHANGE UP (ref 4.8–10.8)

## 2023-11-11 PROCEDURE — 71045 X-RAY EXAM CHEST 1 VIEW: CPT | Mod: 26

## 2023-11-11 PROCEDURE — 93306 TTE W/DOPPLER COMPLETE: CPT | Mod: 26

## 2023-11-11 PROCEDURE — 93925 LOWER EXTREMITY STUDY: CPT | Mod: 26

## 2023-11-11 PROCEDURE — 99291 CRITICAL CARE FIRST HOUR: CPT

## 2023-11-11 PROCEDURE — 93010 ELECTROCARDIOGRAM REPORT: CPT

## 2023-11-11 PROCEDURE — 36620 INSERTION CATHETER ARTERY: CPT

## 2023-11-11 RX ORDER — ACETAMINOPHEN 500 MG
650 TABLET ORAL EVERY 6 HOURS
Refills: 0 | Status: DISCONTINUED | OUTPATIENT
Start: 2023-11-11 | End: 2023-11-19

## 2023-11-11 RX ORDER — MAGNESIUM SULFATE 500 MG/ML
2 VIAL (ML) INJECTION ONCE
Refills: 0 | Status: COMPLETED | OUTPATIENT
Start: 2023-11-11 | End: 2023-11-11

## 2023-11-11 RX ORDER — POTASSIUM CHLORIDE 20 MEQ
20 PACKET (EA) ORAL ONCE
Refills: 0 | Status: COMPLETED | OUTPATIENT
Start: 2023-11-11 | End: 2023-11-11

## 2023-11-11 RX ORDER — CHLORHEXIDINE GLUCONATE 213 G/1000ML
1 SOLUTION TOPICAL DAILY
Refills: 0 | Status: DISCONTINUED | OUTPATIENT
Start: 2023-11-11 | End: 2023-11-19

## 2023-11-11 RX ORDER — DOBUTAMINE HCL 250MG/20ML
2.5 VIAL (ML) INTRAVENOUS
Qty: 500 | Refills: 0 | Status: DISCONTINUED | OUTPATIENT
Start: 2023-11-11 | End: 2023-11-12

## 2023-11-11 RX ADMIN — PANTOPRAZOLE SODIUM 40 MILLIGRAM(S): 20 TABLET, DELAYED RELEASE ORAL at 11:29

## 2023-11-11 RX ADMIN — BUMETANIDE 2 MILLIGRAM(S): 0.25 INJECTION INTRAMUSCULAR; INTRAVENOUS at 00:39

## 2023-11-11 RX ADMIN — Medication 20 MILLIEQUIVALENT(S): at 22:29

## 2023-11-11 RX ADMIN — CHLORHEXIDINE GLUCONATE 1 APPLICATION(S): 213 SOLUTION TOPICAL at 11:34

## 2023-11-11 RX ADMIN — Medication 3 MILLILITER(S): at 09:10

## 2023-11-11 RX ADMIN — Medication 25 GRAM(S): at 22:29

## 2023-11-11 RX ADMIN — BUMETANIDE 5 MG/HR: 0.25 INJECTION INTRAMUSCULAR; INTRAVENOUS at 10:13

## 2023-11-11 RX ADMIN — CLOPIDOGREL BISULFATE 75 MILLIGRAM(S): 75 TABLET, FILM COATED ORAL at 11:34

## 2023-11-11 RX ADMIN — Medication 3 MILLILITER(S): at 20:03

## 2023-11-11 RX ADMIN — HEPARIN SODIUM 5000 UNIT(S): 5000 INJECTION INTRAVENOUS; SUBCUTANEOUS at 17:33

## 2023-11-11 RX ADMIN — BUMETANIDE 10 MG/HR: 0.25 INJECTION INTRAMUSCULAR; INTRAVENOUS at 00:39

## 2023-11-11 RX ADMIN — Medication 100 MILLIGRAM(S): at 05:46

## 2023-11-11 RX ADMIN — Medication 100 MILLIGRAM(S): at 17:32

## 2023-11-11 RX ADMIN — Medication 650 MILLIGRAM(S): at 19:41

## 2023-11-11 RX ADMIN — ATORVASTATIN CALCIUM 40 MILLIGRAM(S): 80 TABLET, FILM COATED ORAL at 22:29

## 2023-11-11 RX ADMIN — BUMETANIDE 10 MG/HR: 0.25 INJECTION INTRAMUSCULAR; INTRAVENOUS at 08:52

## 2023-11-11 RX ADMIN — HEPARIN SODIUM 5000 UNIT(S): 5000 INJECTION INTRAVENOUS; SUBCUTANEOUS at 05:49

## 2023-11-11 NOTE — PROCEDURE NOTE - NSPROCDETAILS_GEN_ALL_CORE
sterile technique, indwelling urinary device inserted
location identified, draped/prepped, sterile technique used, needle inserted/introduced/positive blood return obtained via catheter

## 2023-11-11 NOTE — PROGRESS NOTE ADULT - ASSESSMENT
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      CHF exacerbation  Cardiorenal  COPD on home O2  CORNELIUS on CPAP  HFrEF  HTN  CKD  CAD  DL  DM  CVA  Hypothyrodism  Cellulitis vs DVT        CNS: Avoid CNS Depressants.    HEENT: Oral care. Aspiration precautions     PULMONARY: HOB @ 45. Monitor Pulse Ox. Keep > 93%. Keep on Bipap, recheck ABGs    CARDIOVASCULAR:   - Daily Weight. Strict I&Os. Keep I < O, start Bumex 2mg twice daily, Trend Lactate, hold antihypertensives, consider starting dobutamine if hypotensive    GI: GI prophylaxis. DASH/TLC, carb consistent diet    RENAL: Avoid nephrotoxic agents, Monitor electrolytes and creatinine     INFECTIOUS DISEASE: will start cefazolin for possible cellulitis in left LE    HEMATOLOGICAL: DVT prophylaxis (Lovenox/heparin). f/u LE duplex    ENDOCRINE: Monitor FS. Started on ISS    MUSCULOSKELETAL: Out of bed to chair    ENDO: c/w synthroid    CODE STATUS: Full Code    DISPOSITION: CCU

## 2023-11-11 NOTE — PROGRESS NOTE ADULT - SUBJECTIVE AND OBJECTIVE BOX
24H events:    Patient is a 58y old Female who presents with a chief complaint of Weakness (10 Nov 2023 18:57)  Primary diagnosis of Generalized weakness  Today is hospital day 1d. This morning patient was seen and examined at bedside, resting comfortably in bed.    No acute or major events overnight.    Code Status:    Family communication:  Contact date:  Name of person contacted:  Relationship to patient:  Communication details:  What matters most:    PAST MEDICAL & SURGICAL HISTORY  Hypertension    Hyperlipidemia    Anxiety and depression    COPD, severe    CHF (congestive heart failure)    Cerebrovascular accident (CVA)  Multiple    Type 2 diabetes mellitus    CKD (chronic kidney disease), stage II    No significant past surgical history      SOCIAL HISTORY:  Social History:      ALLERGIES:  No Known Allergies    MEDICATIONS:  STANDING MEDICATIONS  albuterol/ipratropium for Nebulization 3 milliLiter(s) Nebulizer every 6 hours  atorvastatin 40 milliGRAM(s) Oral at bedtime  budesonide 160 MICROgram(s)/formoterol 4.5 MICROgram(s) Inhaler 2 Puff(s) Inhalation two times a day  buMETAnide Infusion 2 mG/Hr IV Continuous <Continuous>  buMETAnide Injectable 2 milliGRAM(s) IV Push two times a day  ceFAZolin   IVPB 1000 milliGRAM(s) IV Intermittent every 12 hours  chlorhexidine 2% Cloths 1 Application(s) Topical daily  clopidogrel Tablet 75 milliGRAM(s) Oral daily  dextrose 5%. 1000 milliLiter(s) IV Continuous <Continuous>  dextrose 50% Injectable 25 Gram(s) IV Push once  DOBUTamine Infusion 2.5 MICROgram(s)/kG/Min IV Continuous <Continuous>  glucagon  Injectable 1 milliGRAM(s) IntraMuscular once  heparin   Injectable 5000 Unit(s) SubCutaneous every 12 hours  insulin lispro (ADMELOG) corrective regimen sliding scale   SubCutaneous three times a day before meals  levothyroxine 25 MICROGram(s) Oral daily  pantoprazole  Injectable 40 milliGRAM(s) IV Push daily    PRN MEDICATIONS  dextrose Oral Gel 15 Gram(s) Oral once PRN    VITALS:   T(F): 97  HR: 84  BP: 138/73  RR: 16  SpO2: 100%    PHYSICAL EXAM:      In moderate distress  PSYCH: AAOx3  CHEST/LUNG: Diffuse crackles  HEART: Regular rate and rhythm; No murmurs;   ABDOMEN: Soft, Nontender, Nondistended; Bowel sounds present; No guarding  EXTREMITIES: Erythema on left LE, b/l edema        AMPAC score:    (  ) Indwelling Wilkerson Catheter:   Date insterted:    Reason (  ) Critical illness     (  ) urinary retention    (  ) Accurate Ins/Outs Monitoring     (  ) CMO patient    (  ) Central Line:   Date inserted:  Location: (  ) Right IJ     (  ) Left IJ     (  ) Right Fem     (  ) Left Fem    (  ) SPC        (  ) pigtail       (  ) PEG tube       (  ) colostomy       (  ) jejunostomy  (  ) U-Dall    LABS:                        9.5    6.79  )-----------( 185      ( 11 Nov 2023 04:45 )             31.4     11-11    141  |  98  |  66<HH>  ----------------------------<  151<H>  4.5   |  29  |  2.9<H>    Ca    9.8      11 Nov 2023 04:45  Phos  4.2     11-11  Mg     2.1     11-11    TPro  7.6  /  Alb  3.8  /  TBili  2.3<H>  /  DBili  x   /  AST  112<H>  /  ALT  75<H>  /  AlkPhos  180<H>  11-11      Urinalysis Basic - ( 11 Nov 2023 04:45 )    Color: x / Appearance: x / SG: x / pH: x  Gluc: 151 mg/dL / Ketone: x  / Bili: x / Urobili: x   Blood: x / Protein: x / Nitrite: x   Leuk Esterase: x / RBC: x / WBC x   Sq Epi: x / Non Sq Epi: x / Bacteria: x      ABG - ( 11 Nov 2023 04:48 )  pH, Arterial: 7.37  pH, Blood: x     /  pCO2: 52    /  pO2: 119   / HCO3: 30    / Base Excess: 4.0   /  SaO2: 98.5              Lactate, Blood: 1.6 mmol/L (11-11-23 @ 04:51)  Lactate, Blood: 3.2 mmol/L *H* (11-10-23 @ 18:12)  Troponin T, Serum: 0.06 ng/mL *HH* (11-10-23 @ 13:42)      CARDIAC MARKERS ( 10 Nov 2023 13:42 )  x     / 0.06 ng/mL / x     / x     / x          RADIOLOGY:

## 2023-11-11 NOTE — CHART NOTE - NSCHARTNOTEFT_GEN_A_CORE
Patient , calculated SVR 2000 (assuming CVP of 15 because of dilated IVC, used VTI for CO).  Plan to start dobutamine 2.5, if MAP high will start Nitro gtt.    Discussed with cardiology fellow

## 2023-11-11 NOTE — PATIENT PROFILE ADULT - FALL HARM RISK - HARM RISK INTERVENTIONS
Assistance with ambulation/Assistance OOB with selected safe patient handling equipment/Communicate Risk of Fall with Harm to all staff/Discuss with provider need for PT consult/Monitor gait and stability/Provide patient with walking aids - walker, cane, crutches/Reinforce activity limits and safety measures with patient and family/Sit up slowly, dangle for a short time, stand at bedside before walking/Tailored Fall Risk Interventions/Visual Cue: Yellow wristband and red socks/Bed in lowest position, wheels locked, appropriate side rails in place/Call bell, personal items and telephone in reach/Instruct patient to call for assistance before getting out of bed or chair/Non-slip footwear when patient is out of bed/[place] to call system/Physically safe environment - no spills, clutter or unnecessary equipment/Purposeful Proactive Rounding/Room/bathroom lighting operational, light cord in reach

## 2023-11-11 NOTE — PROCEDURE NOTE - NSCVLLINESIZE_VASC_A_CORE
Requested medication(s) are due for refill today: Yes  Patient has already received a courtesy refill: No  Other reason request has been forwarded to provider: 2.5

## 2023-11-12 LAB
ALBUMIN SERPL ELPH-MCNC: 3.2 G/DL — LOW (ref 3.5–5.2)
ALBUMIN SERPL ELPH-MCNC: 3.2 G/DL — LOW (ref 3.5–5.2)
ALP SERPL-CCNC: 167 U/L — HIGH (ref 30–115)
ALP SERPL-CCNC: 167 U/L — HIGH (ref 30–115)
ALT FLD-CCNC: 46 U/L — HIGH (ref 0–41)
ALT FLD-CCNC: 46 U/L — HIGH (ref 0–41)
ANION GAP SERPL CALC-SCNC: 12 MMOL/L — SIGNIFICANT CHANGE UP (ref 7–14)
ANION GAP SERPL CALC-SCNC: 12 MMOL/L — SIGNIFICANT CHANGE UP (ref 7–14)
AST SERPL-CCNC: 51 U/L — HIGH (ref 0–41)
AST SERPL-CCNC: 51 U/L — HIGH (ref 0–41)
BASOPHILS # BLD AUTO: 0.03 K/UL — SIGNIFICANT CHANGE UP (ref 0–0.2)
BASOPHILS # BLD AUTO: 0.03 K/UL — SIGNIFICANT CHANGE UP (ref 0–0.2)
BASOPHILS NFR BLD AUTO: 0.5 % — SIGNIFICANT CHANGE UP (ref 0–1)
BASOPHILS NFR BLD AUTO: 0.5 % — SIGNIFICANT CHANGE UP (ref 0–1)
BILIRUB SERPL-MCNC: 1.9 MG/DL — HIGH (ref 0.2–1.2)
BILIRUB SERPL-MCNC: 1.9 MG/DL — HIGH (ref 0.2–1.2)
BUN SERPL-MCNC: 60 MG/DL — HIGH (ref 10–20)
BUN SERPL-MCNC: 60 MG/DL — HIGH (ref 10–20)
CALCIUM SERPL-MCNC: 9 MG/DL — SIGNIFICANT CHANGE UP (ref 8.4–10.5)
CALCIUM SERPL-MCNC: 9 MG/DL — SIGNIFICANT CHANGE UP (ref 8.4–10.5)
CHLORIDE SERPL-SCNC: 97 MMOL/L — LOW (ref 98–110)
CHLORIDE SERPL-SCNC: 97 MMOL/L — LOW (ref 98–110)
CO2 SERPL-SCNC: 34 MMOL/L — HIGH (ref 17–32)
CO2 SERPL-SCNC: 34 MMOL/L — HIGH (ref 17–32)
CREAT SERPL-MCNC: 2.3 MG/DL — HIGH (ref 0.7–1.5)
CREAT SERPL-MCNC: 2.3 MG/DL — HIGH (ref 0.7–1.5)
EGFR: 24 ML/MIN/1.73M2 — LOW
EGFR: 24 ML/MIN/1.73M2 — LOW
EOSINOPHIL # BLD AUTO: 0.13 K/UL — SIGNIFICANT CHANGE UP (ref 0–0.7)
EOSINOPHIL # BLD AUTO: 0.13 K/UL — SIGNIFICANT CHANGE UP (ref 0–0.7)
EOSINOPHIL NFR BLD AUTO: 2.4 % — SIGNIFICANT CHANGE UP (ref 0–8)
EOSINOPHIL NFR BLD AUTO: 2.4 % — SIGNIFICANT CHANGE UP (ref 0–8)
FERRITIN SERPL-MCNC: 245 NG/ML — SIGNIFICANT CHANGE UP (ref 13–330)
FERRITIN SERPL-MCNC: 245 NG/ML — SIGNIFICANT CHANGE UP (ref 13–330)
GLUCOSE BLDC GLUCOMTR-MCNC: 118 MG/DL — HIGH (ref 70–99)
GLUCOSE BLDC GLUCOMTR-MCNC: 118 MG/DL — HIGH (ref 70–99)
GLUCOSE BLDC GLUCOMTR-MCNC: 131 MG/DL — HIGH (ref 70–99)
GLUCOSE BLDC GLUCOMTR-MCNC: 131 MG/DL — HIGH (ref 70–99)
GLUCOSE BLDC GLUCOMTR-MCNC: 173 MG/DL — HIGH (ref 70–99)
GLUCOSE BLDC GLUCOMTR-MCNC: 173 MG/DL — HIGH (ref 70–99)
GLUCOSE BLDC GLUCOMTR-MCNC: 206 MG/DL — HIGH (ref 70–99)
GLUCOSE BLDC GLUCOMTR-MCNC: 206 MG/DL — HIGH (ref 70–99)
GLUCOSE SERPL-MCNC: 126 MG/DL — HIGH (ref 70–99)
GLUCOSE SERPL-MCNC: 126 MG/DL — HIGH (ref 70–99)
HCT VFR BLD CALC: 29.8 % — LOW (ref 37–47)
HCT VFR BLD CALC: 29.8 % — LOW (ref 37–47)
HGB BLD-MCNC: 9.1 G/DL — LOW (ref 12–16)
HGB BLD-MCNC: 9.1 G/DL — LOW (ref 12–16)
IMM GRANULOCYTES NFR BLD AUTO: 0.7 % — HIGH (ref 0.1–0.3)
IMM GRANULOCYTES NFR BLD AUTO: 0.7 % — HIGH (ref 0.1–0.3)
LYMPHOCYTES # BLD AUTO: 0.67 K/UL — LOW (ref 1.2–3.4)
LYMPHOCYTES # BLD AUTO: 0.67 K/UL — LOW (ref 1.2–3.4)
LYMPHOCYTES # BLD AUTO: 12.3 % — LOW (ref 20.5–51.1)
LYMPHOCYTES # BLD AUTO: 12.3 % — LOW (ref 20.5–51.1)
MAGNESIUM SERPL-MCNC: 2 MG/DL — SIGNIFICANT CHANGE UP (ref 1.8–2.4)
MAGNESIUM SERPL-MCNC: 2 MG/DL — SIGNIFICANT CHANGE UP (ref 1.8–2.4)
MCHC RBC-ENTMCNC: 26 PG — LOW (ref 27–31)
MCHC RBC-ENTMCNC: 26 PG — LOW (ref 27–31)
MCHC RBC-ENTMCNC: 30.5 G/DL — LOW (ref 32–37)
MCHC RBC-ENTMCNC: 30.5 G/DL — LOW (ref 32–37)
MCV RBC AUTO: 85.1 FL — SIGNIFICANT CHANGE UP (ref 81–99)
MCV RBC AUTO: 85.1 FL — SIGNIFICANT CHANGE UP (ref 81–99)
MONOCYTES # BLD AUTO: 0.67 K/UL — HIGH (ref 0.1–0.6)
MONOCYTES # BLD AUTO: 0.67 K/UL — HIGH (ref 0.1–0.6)
MONOCYTES NFR BLD AUTO: 12.3 % — HIGH (ref 1.7–9.3)
MONOCYTES NFR BLD AUTO: 12.3 % — HIGH (ref 1.7–9.3)
NEUTROPHILS # BLD AUTO: 3.92 K/UL — SIGNIFICANT CHANGE UP (ref 1.4–6.5)
NEUTROPHILS # BLD AUTO: 3.92 K/UL — SIGNIFICANT CHANGE UP (ref 1.4–6.5)
NEUTROPHILS NFR BLD AUTO: 71.8 % — SIGNIFICANT CHANGE UP (ref 42.2–75.2)
NEUTROPHILS NFR BLD AUTO: 71.8 % — SIGNIFICANT CHANGE UP (ref 42.2–75.2)
NRBC # BLD: 0 /100 WBCS — SIGNIFICANT CHANGE UP (ref 0–0)
NRBC # BLD: 0 /100 WBCS — SIGNIFICANT CHANGE UP (ref 0–0)
PHOSPHATE SERPL-MCNC: 3 MG/DL — SIGNIFICANT CHANGE UP (ref 2.1–4.9)
PHOSPHATE SERPL-MCNC: 3 MG/DL — SIGNIFICANT CHANGE UP (ref 2.1–4.9)
PLATELET # BLD AUTO: 176 K/UL — SIGNIFICANT CHANGE UP (ref 130–400)
PLATELET # BLD AUTO: 176 K/UL — SIGNIFICANT CHANGE UP (ref 130–400)
PMV BLD: 11 FL — HIGH (ref 7.4–10.4)
PMV BLD: 11 FL — HIGH (ref 7.4–10.4)
POTASSIUM SERPL-MCNC: 3.8 MMOL/L — SIGNIFICANT CHANGE UP (ref 3.5–5)
POTASSIUM SERPL-MCNC: 3.8 MMOL/L — SIGNIFICANT CHANGE UP (ref 3.5–5)
POTASSIUM SERPL-SCNC: 3.8 MMOL/L — SIGNIFICANT CHANGE UP (ref 3.5–5)
POTASSIUM SERPL-SCNC: 3.8 MMOL/L — SIGNIFICANT CHANGE UP (ref 3.5–5)
PROT SERPL-MCNC: 6.9 G/DL — SIGNIFICANT CHANGE UP (ref 6–8)
PROT SERPL-MCNC: 6.9 G/DL — SIGNIFICANT CHANGE UP (ref 6–8)
RBC # BLD: 3.5 M/UL — LOW (ref 4.2–5.4)
RBC # BLD: 3.5 M/UL — LOW (ref 4.2–5.4)
RBC # FLD: 21.5 % — HIGH (ref 11.5–14.5)
RBC # FLD: 21.5 % — HIGH (ref 11.5–14.5)
SODIUM SERPL-SCNC: 143 MMOL/L — SIGNIFICANT CHANGE UP (ref 135–146)
SODIUM SERPL-SCNC: 143 MMOL/L — SIGNIFICANT CHANGE UP (ref 135–146)
WBC # BLD: 5.46 K/UL — SIGNIFICANT CHANGE UP (ref 4.8–10.8)
WBC # BLD: 5.46 K/UL — SIGNIFICANT CHANGE UP (ref 4.8–10.8)
WBC # FLD AUTO: 5.46 K/UL — SIGNIFICANT CHANGE UP (ref 4.8–10.8)
WBC # FLD AUTO: 5.46 K/UL — SIGNIFICANT CHANGE UP (ref 4.8–10.8)

## 2023-11-12 PROCEDURE — 93010 ELECTROCARDIOGRAM REPORT: CPT

## 2023-11-12 PROCEDURE — 99233 SBSQ HOSP IP/OBS HIGH 50: CPT

## 2023-11-12 PROCEDURE — 71045 X-RAY EXAM CHEST 1 VIEW: CPT | Mod: 26

## 2023-11-12 RX ORDER — BUMETANIDE 0.25 MG/ML
1 INJECTION INTRAMUSCULAR; INTRAVENOUS EVERY 12 HOURS
Refills: 0 | Status: DISCONTINUED | OUTPATIENT
Start: 2023-11-12 | End: 2023-11-14

## 2023-11-12 RX ORDER — LANOLIN ALCOHOL/MO/W.PET/CERES
5 CREAM (GRAM) TOPICAL AT BEDTIME
Refills: 0 | Status: DISCONTINUED | OUTPATIENT
Start: 2023-11-12 | End: 2023-11-19

## 2023-11-12 RX ADMIN — HEPARIN SODIUM 5000 UNIT(S): 5000 INJECTION INTRAVENOUS; SUBCUTANEOUS at 17:52

## 2023-11-12 RX ADMIN — CHLORHEXIDINE GLUCONATE 1 APPLICATION(S): 213 SOLUTION TOPICAL at 12:54

## 2023-11-12 RX ADMIN — PANTOPRAZOLE SODIUM 40 MILLIGRAM(S): 20 TABLET, DELAYED RELEASE ORAL at 12:54

## 2023-11-12 RX ADMIN — Medication 2: at 12:57

## 2023-11-12 RX ADMIN — Medication 5 MILLIGRAM(S): at 21:28

## 2023-11-12 RX ADMIN — BUDESONIDE AND FORMOTEROL FUMARATE DIHYDRATE 2 PUFF(S): 160; 4.5 AEROSOL RESPIRATORY (INHALATION) at 08:51

## 2023-11-12 RX ADMIN — ATORVASTATIN CALCIUM 40 MILLIGRAM(S): 80 TABLET, FILM COATED ORAL at 21:28

## 2023-11-12 RX ADMIN — CLOPIDOGREL BISULFATE 75 MILLIGRAM(S): 75 TABLET, FILM COATED ORAL at 12:54

## 2023-11-12 RX ADMIN — BUMETANIDE 1 MILLIGRAM(S): 0.25 INJECTION INTRAMUSCULAR; INTRAVENOUS at 17:52

## 2023-11-12 RX ADMIN — Medication 3 MILLILITER(S): at 19:49

## 2023-11-12 RX ADMIN — Medication 1: at 16:12

## 2023-11-12 RX ADMIN — Medication 100 MILLIGRAM(S): at 17:52

## 2023-11-12 RX ADMIN — HEPARIN SODIUM 5000 UNIT(S): 5000 INJECTION INTRAVENOUS; SUBCUTANEOUS at 05:05

## 2023-11-12 RX ADMIN — Medication 100 MILLIGRAM(S): at 05:05

## 2023-11-12 RX ADMIN — Medication 8.1 MICROGRAM(S)/KG/MIN: at 08:51

## 2023-11-12 NOTE — SWALLOW BEDSIDE ASSESSMENT ADULT - PHARYNGEAL PHASE
inconsistently across all trials; increased difficulty with straw/Cough post oral intake/Throat clear post oral intake/Delayed cough post oral intake cough with straw x1, no further via cup sips

## 2023-11-12 NOTE — SWALLOW BEDSIDE ASSESSMENT ADULT - ADDITIONAL RECOMMENDATIONS
SLP will f/u for diet tolerance/candidacy for diet upgrade vs need for objective swallowing assessment

## 2023-11-12 NOTE — PROGRESS NOTE ADULT - SUBJECTIVE AND OBJECTIVE BOX
CHIEF COMPLAINT:  Patient is a 58y old  Female who presents with a chief complaint of Weakness (11 Nov 2023 07:43)      INTERVAL HISTORY/OVERNIGHT EVENTS:  Dobutamine started overnight secondary to hypotension  6 runs of NSVT  Patient remains on NC  Patient difficult time sleeping last night, but feels better today    ======================  MEDICATIONS:  albuterol/ipratropium for Nebulization 3 milliLiter(s) Nebulizer every 6 hours  atorvastatin 40 milliGRAM(s) Oral at bedtime  budesonide 160 MICROgram(s)/formoterol 4.5 MICROgram(s) Inhaler 2 Puff(s) Inhalation two times a day  buMETAnide Injectable 1 milliGRAM(s) IV Push every 12 hours  ceFAZolin   IVPB 1000 milliGRAM(s) IV Intermittent every 12 hours  chlorhexidine 2% Cloths 1 Application(s) Topical daily  clopidogrel Tablet 75 milliGRAM(s) Oral daily  dextrose 50% Injectable 25 Gram(s) IV Push once  glucagon  Injectable 1 milliGRAM(s) IntraMuscular once  heparin   Injectable 5000 Unit(s) SubCutaneous every 12 hours  insulin lispro (ADMELOG) corrective regimen sliding scale   SubCutaneous three times a day before meals  levothyroxine 25 MICROGram(s) Oral daily  pantoprazole  Injectable 40 milliGRAM(s) IV Push daily    DRIPS:  dextrose 5%. 1000 milliLiter(s) (50 mL/Hr) IV Continuous <Continuous>    PRN:       ======================  PHYSICAL EXAMINATION:  GEN:  nad.   HEENT:  eomi. ncat  PULM:  b/l lung sounds   CARD: s1, s2  ABD: +bs. ntnd  EXT:  no new rashes.    NEURO:  no new focal deficits.   ======================  OBJECTIVE:        VS:  T(F): 96.8 (11-12 @ 16:00), Max: 98.1 (11-12 @ 04:00)  HR: 103 (11-12 @ 17:00) (86 - 111)  BP: 158/69 (11-12 @ 07:42) (158/69 - 158/69)  RR: 24 (11-12 @ 17:00) (13 - 31)  SpO2: 95% (11-12 @ 17:00) (92% - 97%)  CVP(mm Hg): --  CO: --  CI: --  PA: --  PCWP: --    I/O:      11-10 @ 07:01  -  11-11 @ 07:00  --------------------------------------------------------  IN: 112.4 mL / OUT: 550 mL / NET: -437.6 mL    11-11 @ 07:01  -  11-12 @ 07:00  --------------------------------------------------------  IN: 673.2 mL / OUT: 5855 mL / NET: -5181.8 mL    11-12 @ 07:01  -  11-12 @ 18:31  --------------------------------------------------------  IN: 456 mL / OUT: 3605 mL / NET: -3149 mL        Weight trend:  Weight (kg): 108 (11-10)    ======================    LABS:  AB - ( 11 Nov 2023 04:48 )  pH, Arterial: 7.37  pH, Blood: x     /  pCO2: 52    /  pO2: 119   / HCO3: 30    / Base Excess: 4.0   /  SaO2: 98.5                                    9.1    5.46  )-----------( 176      ( 12 Nov 2023 05:21 )             29.8     11-12    143  |  97<L>  |  60<H>  ----------------------------<  126<H>  3.8   |  34<H>  |  2.3<H>    Ca    9.0      12 Nov 2023 05:21  Phos  3.0     11-12  Mg     2.0     11-12    TPro  6.9  /  Alb  3.2<L>  /  TBili  1.9<H>  /  DBili  x   /  AST  51<H>  /  ALT  46<H>  /  AlkPhos  167<H>  11-12    LIVER FUNCTIONS - ( 12 Nov 2023 05:21 )  Alb: 3.2 g/dL / Pro: 6.9 g/dL / ALK PHOS: 167 U/L / ALT: 46 U/L / AST: 51 U/L / GGT: x                     Urinalysis Basic - ( 12 Nov 2023 05:21 )    Color: x / Appearance: x / SG: x / pH: x  Gluc: 126 mg/dL / Ketone: x  / Bili: x / Urobili: x   Blood: x / Protein: x / Nitrite: x   Leuk Esterase: x / RBC: x / WBC x   Sq Epi: x / Non Sq Epi: x / Bacteria: x        Cultures:    Culture - Blood (collected 11-10)  Source: .Blood Blood-Peripheral  Preliminary Report:    No growth at 24 hours    Culture - Blood (collected 11-10)  Source: .Blood Blood-Peripheral  Preliminary Report:    No growth at 24 hours

## 2023-11-12 NOTE — PROGRESS NOTE ADULT - ASSESSMENT
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      CHF exacerbation  Cardiorenal  COPD on home O2  CORNELIUS on CPAP  HFrEF  HTN  CKD  CAD  DL  DM  CVA  Hypothyrodism  Cellulitis vs DVT        CNS: Avoid CNS Depressants.    HEENT: Oral care. Aspiration precautions     PULMONARY:   -HOB @ 45.   -Patient on 6L NC. Monitor Pulse Ox. Keep > 93%.     CARDIOVASCULAR:   - Daily Weight. Strict I&Os. Keep I < O.   - Discontinue dobutamine  -Transition from Bumex 2mg twice daily to 1 mg q12, -Trend Lactate, hold antihypertensives    GI:   -GI prophylaxis. DASH/TLC, carb consistent diet    RENAL:   -Avoid nephrotoxic agents, Monitor electrolytes and creatinine     INFECTIOUS DISEASE:   -Continue cefazolin for possible cellulitis in left LE    HEMATOLOGICAL:   - DVT prophylaxis (Lovenox/heparin).  - LE duplex with bilateral superficial femoral artery occlusions with reconstitution in   the popliteal arteries.    ENDOCRINE:  - Monitor FS. Started on ISS    MUSCULOSKELETAL: Out of bed to chair    ENDO: c/w synthroid    CODE STATUS: Full Code    DISPOSITION: CCU

## 2023-11-12 NOTE — SWALLOW BEDSIDE ASSESSMENT ADULT - SLP GENERAL OBSERVATIONS
Pt able to functionally communicate wants and needs and follow simple directives. She reports "feeling weak" and that she has been having difficulty drinking and eating (coughing) since weakness started. Noted that softer solid and mildly thick liquids were "easier"

## 2023-11-12 NOTE — SWALLOW BEDSIDE ASSESSMENT ADULT - ORAL PHASE
Decreased anterior-posterior movement of the bolus/Delayed oral transit time mildly delayed/Delayed oral transit time

## 2023-11-13 LAB
ALBUMIN SERPL ELPH-MCNC: 3.6 G/DL — SIGNIFICANT CHANGE UP (ref 3.5–5.2)
ALBUMIN SERPL ELPH-MCNC: 3.6 G/DL — SIGNIFICANT CHANGE UP (ref 3.5–5.2)
ALP SERPL-CCNC: 177 U/L — HIGH (ref 30–115)
ALP SERPL-CCNC: 177 U/L — HIGH (ref 30–115)
ALT FLD-CCNC: 28 U/L — SIGNIFICANT CHANGE UP (ref 0–41)
ALT FLD-CCNC: 28 U/L — SIGNIFICANT CHANGE UP (ref 0–41)
ANION GAP SERPL CALC-SCNC: 12 MMOL/L — SIGNIFICANT CHANGE UP (ref 7–14)
ANION GAP SERPL CALC-SCNC: 12 MMOL/L — SIGNIFICANT CHANGE UP (ref 7–14)
ANION GAP SERPL CALC-SCNC: 9 MMOL/L — SIGNIFICANT CHANGE UP (ref 7–14)
ANION GAP SERPL CALC-SCNC: 9 MMOL/L — SIGNIFICANT CHANGE UP (ref 7–14)
AST SERPL-CCNC: 34 U/L — SIGNIFICANT CHANGE UP (ref 0–41)
AST SERPL-CCNC: 34 U/L — SIGNIFICANT CHANGE UP (ref 0–41)
BASOPHILS # BLD AUTO: 0.05 K/UL — SIGNIFICANT CHANGE UP (ref 0–0.2)
BASOPHILS # BLD AUTO: 0.05 K/UL — SIGNIFICANT CHANGE UP (ref 0–0.2)
BASOPHILS NFR BLD AUTO: 0.8 % — SIGNIFICANT CHANGE UP (ref 0–1)
BASOPHILS NFR BLD AUTO: 0.8 % — SIGNIFICANT CHANGE UP (ref 0–1)
BILIRUB SERPL-MCNC: 2.1 MG/DL — HIGH (ref 0.2–1.2)
BILIRUB SERPL-MCNC: 2.1 MG/DL — HIGH (ref 0.2–1.2)
BUN SERPL-MCNC: 39 MG/DL — HIGH (ref 10–20)
BUN SERPL-MCNC: 39 MG/DL — HIGH (ref 10–20)
BUN SERPL-MCNC: 44 MG/DL — HIGH (ref 10–20)
BUN SERPL-MCNC: 44 MG/DL — HIGH (ref 10–20)
CALCIUM SERPL-MCNC: 8.7 MG/DL — SIGNIFICANT CHANGE UP (ref 8.4–10.4)
CALCIUM SERPL-MCNC: 8.7 MG/DL — SIGNIFICANT CHANGE UP (ref 8.4–10.4)
CALCIUM SERPL-MCNC: 8.8 MG/DL — SIGNIFICANT CHANGE UP (ref 8.4–10.4)
CALCIUM SERPL-MCNC: 8.8 MG/DL — SIGNIFICANT CHANGE UP (ref 8.4–10.4)
CHLORIDE SERPL-SCNC: 88 MMOL/L — LOW (ref 98–110)
CO2 SERPL-SCNC: 41 MMOL/L — CRITICAL HIGH (ref 17–32)
CO2 SERPL-SCNC: 41 MMOL/L — CRITICAL HIGH (ref 17–32)
CO2 SERPL-SCNC: 45 MMOL/L — CRITICAL HIGH (ref 17–32)
CO2 SERPL-SCNC: 45 MMOL/L — CRITICAL HIGH (ref 17–32)
CREAT SERPL-MCNC: 1.4 MG/DL — SIGNIFICANT CHANGE UP (ref 0.7–1.5)
CREAT SERPL-MCNC: 1.4 MG/DL — SIGNIFICANT CHANGE UP (ref 0.7–1.5)
CREAT SERPL-MCNC: 1.5 MG/DL — SIGNIFICANT CHANGE UP (ref 0.7–1.5)
CREAT SERPL-MCNC: 1.5 MG/DL — SIGNIFICANT CHANGE UP (ref 0.7–1.5)
EGFR: 41 ML/MIN/1.73M2 — LOW
EGFR: 41 ML/MIN/1.73M2 — LOW
EGFR: 44 ML/MIN/1.73M2 — LOW
EGFR: 44 ML/MIN/1.73M2 — LOW
EOSINOPHIL # BLD AUTO: 0.14 K/UL — SIGNIFICANT CHANGE UP (ref 0–0.7)
EOSINOPHIL # BLD AUTO: 0.14 K/UL — SIGNIFICANT CHANGE UP (ref 0–0.7)
EOSINOPHIL NFR BLD AUTO: 2.4 % — SIGNIFICANT CHANGE UP (ref 0–8)
EOSINOPHIL NFR BLD AUTO: 2.4 % — SIGNIFICANT CHANGE UP (ref 0–8)
GLUCOSE BLDC GLUCOMTR-MCNC: 145 MG/DL — HIGH (ref 70–99)
GLUCOSE BLDC GLUCOMTR-MCNC: 145 MG/DL — HIGH (ref 70–99)
GLUCOSE BLDC GLUCOMTR-MCNC: 152 MG/DL — HIGH (ref 70–99)
GLUCOSE BLDC GLUCOMTR-MCNC: 152 MG/DL — HIGH (ref 70–99)
GLUCOSE SERPL-MCNC: 140 MG/DL — HIGH (ref 70–99)
GLUCOSE SERPL-MCNC: 140 MG/DL — HIGH (ref 70–99)
GLUCOSE SERPL-MCNC: 176 MG/DL — HIGH (ref 70–99)
GLUCOSE SERPL-MCNC: 176 MG/DL — HIGH (ref 70–99)
HCT VFR BLD CALC: 34.4 % — LOW (ref 37–47)
HCT VFR BLD CALC: 34.4 % — LOW (ref 37–47)
HGB BLD-MCNC: 10.5 G/DL — LOW (ref 12–16)
HGB BLD-MCNC: 10.5 G/DL — LOW (ref 12–16)
IMM GRANULOCYTES NFR BLD AUTO: 1.2 % — HIGH (ref 0.1–0.3)
IMM GRANULOCYTES NFR BLD AUTO: 1.2 % — HIGH (ref 0.1–0.3)
LYMPHOCYTES # BLD AUTO: 0.95 K/UL — LOW (ref 1.2–3.4)
LYMPHOCYTES # BLD AUTO: 0.95 K/UL — LOW (ref 1.2–3.4)
LYMPHOCYTES # BLD AUTO: 16 % — LOW (ref 20.5–51.1)
LYMPHOCYTES # BLD AUTO: 16 % — LOW (ref 20.5–51.1)
MAGNESIUM SERPL-MCNC: 1.3 MG/DL — LOW (ref 1.8–2.4)
MAGNESIUM SERPL-MCNC: 1.3 MG/DL — LOW (ref 1.8–2.4)
MAGNESIUM SERPL-MCNC: 2.9 MG/DL — HIGH (ref 1.8–2.4)
MAGNESIUM SERPL-MCNC: 2.9 MG/DL — HIGH (ref 1.8–2.4)
MCHC RBC-ENTMCNC: 26.4 PG — LOW (ref 27–31)
MCHC RBC-ENTMCNC: 26.4 PG — LOW (ref 27–31)
MCHC RBC-ENTMCNC: 30.5 G/DL — LOW (ref 32–37)
MCHC RBC-ENTMCNC: 30.5 G/DL — LOW (ref 32–37)
MCV RBC AUTO: 86.6 FL — SIGNIFICANT CHANGE UP (ref 81–99)
MCV RBC AUTO: 86.6 FL — SIGNIFICANT CHANGE UP (ref 81–99)
MONOCYTES # BLD AUTO: 0.78 K/UL — HIGH (ref 0.1–0.6)
MONOCYTES # BLD AUTO: 0.78 K/UL — HIGH (ref 0.1–0.6)
MONOCYTES NFR BLD AUTO: 13.1 % — HIGH (ref 1.7–9.3)
MONOCYTES NFR BLD AUTO: 13.1 % — HIGH (ref 1.7–9.3)
NEUTROPHILS # BLD AUTO: 3.96 K/UL — SIGNIFICANT CHANGE UP (ref 1.4–6.5)
NEUTROPHILS # BLD AUTO: 3.96 K/UL — SIGNIFICANT CHANGE UP (ref 1.4–6.5)
NEUTROPHILS NFR BLD AUTO: 66.5 % — SIGNIFICANT CHANGE UP (ref 42.2–75.2)
NEUTROPHILS NFR BLD AUTO: 66.5 % — SIGNIFICANT CHANGE UP (ref 42.2–75.2)
NRBC # BLD: 0 /100 WBCS — SIGNIFICANT CHANGE UP (ref 0–0)
NRBC # BLD: 0 /100 WBCS — SIGNIFICANT CHANGE UP (ref 0–0)
PHOSPHATE SERPL-MCNC: 2.7 MG/DL — SIGNIFICANT CHANGE UP (ref 2.1–4.9)
PHOSPHATE SERPL-MCNC: 2.7 MG/DL — SIGNIFICANT CHANGE UP (ref 2.1–4.9)
PLATELET # BLD AUTO: 175 K/UL — SIGNIFICANT CHANGE UP (ref 130–400)
PLATELET # BLD AUTO: 175 K/UL — SIGNIFICANT CHANGE UP (ref 130–400)
PMV BLD: 10.4 FL — SIGNIFICANT CHANGE UP (ref 7.4–10.4)
PMV BLD: 10.4 FL — SIGNIFICANT CHANGE UP (ref 7.4–10.4)
POTASSIUM SERPL-MCNC: 3.6 MMOL/L — SIGNIFICANT CHANGE UP (ref 3.5–5)
POTASSIUM SERPL-MCNC: 3.6 MMOL/L — SIGNIFICANT CHANGE UP (ref 3.5–5)
POTASSIUM SERPL-MCNC: 3.9 MMOL/L — SIGNIFICANT CHANGE UP (ref 3.5–5)
POTASSIUM SERPL-MCNC: 3.9 MMOL/L — SIGNIFICANT CHANGE UP (ref 3.5–5)
POTASSIUM SERPL-SCNC: 3.6 MMOL/L — SIGNIFICANT CHANGE UP (ref 3.5–5)
POTASSIUM SERPL-SCNC: 3.6 MMOL/L — SIGNIFICANT CHANGE UP (ref 3.5–5)
POTASSIUM SERPL-SCNC: 3.9 MMOL/L — SIGNIFICANT CHANGE UP (ref 3.5–5)
POTASSIUM SERPL-SCNC: 3.9 MMOL/L — SIGNIFICANT CHANGE UP (ref 3.5–5)
PROT SERPL-MCNC: 7.5 G/DL — SIGNIFICANT CHANGE UP (ref 6–8)
PROT SERPL-MCNC: 7.5 G/DL — SIGNIFICANT CHANGE UP (ref 6–8)
RBC # BLD: 3.97 M/UL — LOW (ref 4.2–5.4)
RBC # BLD: 3.97 M/UL — LOW (ref 4.2–5.4)
RBC # FLD: 22 % — HIGH (ref 11.5–14.5)
RBC # FLD: 22 % — HIGH (ref 11.5–14.5)
SODIUM SERPL-SCNC: 141 MMOL/L — SIGNIFICANT CHANGE UP (ref 135–146)
SODIUM SERPL-SCNC: 141 MMOL/L — SIGNIFICANT CHANGE UP (ref 135–146)
SODIUM SERPL-SCNC: 142 MMOL/L — SIGNIFICANT CHANGE UP (ref 135–146)
SODIUM SERPL-SCNC: 142 MMOL/L — SIGNIFICANT CHANGE UP (ref 135–146)
WBC # BLD: 5.95 K/UL — SIGNIFICANT CHANGE UP (ref 4.8–10.8)
WBC # BLD: 5.95 K/UL — SIGNIFICANT CHANGE UP (ref 4.8–10.8)
WBC # FLD AUTO: 5.95 K/UL — SIGNIFICANT CHANGE UP (ref 4.8–10.8)
WBC # FLD AUTO: 5.95 K/UL — SIGNIFICANT CHANGE UP (ref 4.8–10.8)

## 2023-11-13 PROCEDURE — 71045 X-RAY EXAM CHEST 1 VIEW: CPT | Mod: 26

## 2023-11-13 PROCEDURE — 93010 ELECTROCARDIOGRAM REPORT: CPT

## 2023-11-13 PROCEDURE — 99291 CRITICAL CARE FIRST HOUR: CPT

## 2023-11-13 RX ORDER — POTASSIUM CHLORIDE 20 MEQ
40 PACKET (EA) ORAL ONCE
Refills: 0 | Status: COMPLETED | OUTPATIENT
Start: 2023-11-13 | End: 2023-11-13

## 2023-11-13 RX ORDER — MAGNESIUM SULFATE 500 MG/ML
2 VIAL (ML) INJECTION
Refills: 0 | Status: COMPLETED | OUTPATIENT
Start: 2023-11-13 | End: 2023-11-13

## 2023-11-13 RX ORDER — DAPAGLIFLOZIN 10 MG/1
1 TABLET, FILM COATED ORAL
Qty: 0 | Refills: 0 | DISCHARGE

## 2023-11-13 RX ORDER — LEVOTHYROXINE SODIUM 125 MCG
1 TABLET ORAL
Qty: 0 | Refills: 0 | DISCHARGE

## 2023-11-13 RX ORDER — PANTOPRAZOLE SODIUM 20 MG/1
40 TABLET, DELAYED RELEASE ORAL
Refills: 0 | Status: DISCONTINUED | OUTPATIENT
Start: 2023-11-13 | End: 2023-11-19

## 2023-11-13 RX ORDER — SPIRONOLACTONE 25 MG/1
25 TABLET, FILM COATED ORAL DAILY
Refills: 0 | Status: DISCONTINUED | OUTPATIENT
Start: 2023-11-13 | End: 2023-11-19

## 2023-11-13 RX ADMIN — Medication 25 GRAM(S): at 11:44

## 2023-11-13 RX ADMIN — Medication 1: at 07:27

## 2023-11-13 RX ADMIN — BUMETANIDE 1 MILLIGRAM(S): 0.25 INJECTION INTRAMUSCULAR; INTRAVENOUS at 05:11

## 2023-11-13 RX ADMIN — HEPARIN SODIUM 5000 UNIT(S): 5000 INJECTION INTRAVENOUS; SUBCUTANEOUS at 17:05

## 2023-11-13 RX ADMIN — Medication 25 GRAM(S): at 09:51

## 2023-11-13 RX ADMIN — Medication 100 MILLIGRAM(S): at 05:10

## 2023-11-13 RX ADMIN — CHLORHEXIDINE GLUCONATE 1 APPLICATION(S): 213 SOLUTION TOPICAL at 11:43

## 2023-11-13 RX ADMIN — Medication 25 GRAM(S): at 13:48

## 2023-11-13 RX ADMIN — HEPARIN SODIUM 5000 UNIT(S): 5000 INJECTION INTRAVENOUS; SUBCUTANEOUS at 05:11

## 2023-11-13 RX ADMIN — Medication 100 MILLIGRAM(S): at 17:04

## 2023-11-13 RX ADMIN — Medication 1: at 17:04

## 2023-11-13 RX ADMIN — Medication 5 MILLIGRAM(S): at 21:14

## 2023-11-13 RX ADMIN — BUMETANIDE 1 MILLIGRAM(S): 0.25 INJECTION INTRAMUSCULAR; INTRAVENOUS at 17:05

## 2023-11-13 RX ADMIN — ATORVASTATIN CALCIUM 40 MILLIGRAM(S): 80 TABLET, FILM COATED ORAL at 21:16

## 2023-11-13 RX ADMIN — Medication 40 MILLIEQUIVALENT(S): at 10:02

## 2023-11-13 RX ADMIN — CLOPIDOGREL BISULFATE 75 MILLIGRAM(S): 75 TABLET, FILM COATED ORAL at 11:43

## 2023-11-13 RX ADMIN — BUDESONIDE AND FORMOTEROL FUMARATE DIHYDRATE 2 PUFF(S): 160; 4.5 AEROSOL RESPIRATORY (INHALATION) at 21:21

## 2023-11-13 RX ADMIN — Medication 25 MICROGRAM(S): at 05:10

## 2023-11-13 NOTE — PROGRESS NOTE ADULT - ASSESSMENT
59 y/o female mixed cardiomyopathy LVEF 20% w/ ICD, CAD PCI of RCA in 2021, severe pHTN class II/III, severe PAD, COPD on home O2, CVA (bedbound) presenting with ADH, JEMIMA on CKD, respiratory failure. Precipitating factor possibly 2/2 LLE cellulitis      #NICM/ICM  #HFrEF  #ADHF  #Severe pHTN class II/III  #Severe PAD  #LLE cellulitis  #Acute hypoxic/hypercarbic respiratory failure  #Severe COPD  #AKD on CKD 59 y/o female mixed cardiomyopathy LVEF 20% w/ ICD, CAD PCI of RCA in 2021, severe pHTN class II/III, severe PAD, COPD on home O2, CVA (bedbound) presenting with ADH, JEMIMA on CKD, respiratory failure. Precipitating factor possibly 2/2 LLE cellulitis and/or medication noncompliacne.     #NICM/ICM  #HFrEF  #ADHF  #Severe pHTN class II/III  #Severe PAD  #LLE cellulitis  #Acute hypoxic/hypercarbic respiratory failure  #Severe COPD  #AKD on CKD    Plan  -IV bumex 2 mg for net negative goal 2L  -Start spironolactone 25 mg daily  -Keep K>4, Mag>2  -Continue abx for 5 day course    Mic Khan MD  Interventional Cardiology/Advanced Heart Failure Transplant   59 y/o female mixed cardiomyopathy LVEF 20% w/ ICD, CAD PCI of RCA in 2021, severe pHTN class II/III, severe PAD, COPD on home O2, CVA (bedbound) presenting with ADH, JEMIMA on CKD, respiratory failure. Precipitating factor possibly 2/2 LLE cellulitis and/or medication noncompliance. She is no longer requiring inotropes and her renal function has improved back to baseline. On exam she is warm, well perfused, and hypervolemic. She has been receiving IV bumex with a good response and we will continue for a net negative goal of 2L.     In regards to GDMT, she was on Entresto and Farxiga in the past. We will start with spironolactone 25 mg daily and likely restart Entrest/SGLT2i in the next few days. We will hold off on a BB for now as she is only recently off of inotropes. In regards to her chronic severe PAD, there does not appear to be a significant change from her prior studies and her exam is not concerning for acute limb ischemia. We will continue aggressive secondary modification.    #NICM/ICM  #HFrEF  #ADHF  #Severe pHTN class II/III  #Severe PAD  #LLE cellulitis  #Acute hypoxic/hypercarbic respiratory failure  #Severe COPD  #AKD on CKD    Plan  -IV bumex 2 mg for net negative goal 2L  -Start spironolactone 25 mg daily  -continue plavix. increase atorvastatin to 80 mg   -Keep K>4, Mag>2  -Continue abx for 5 day course    Mic Khan MD  Interventional Cardiology/Advanced Heart Failure Transplant

## 2023-11-13 NOTE — PROGRESS NOTE ADULT - SUBJECTIVE AND OBJECTIVE BOX
Cardiology Attending Note    WILLIAN MARY  MRN-[id number]    Date of Admission: 11-10-23    Brief HPI: 59 y/o female mixed cardiomyopathy LVEF 20% w/ ICD, CAD PCI of RCA in 2021, severe pHTN class II/III, severe PAD, COPD on home O2, CVA (bedbound) presenting with ADH, JEMIMA on CKD, respiratory failure. Precipitating factor possibly 2/2 LLE cellulitis      24 hr events:    acetaminophen     Tablet .. 650 milliGRAM(s) Oral every 6 hours PRN  albuterol/ipratropium for Nebulization 3 milliLiter(s) Nebulizer every 6 hours  atorvastatin 40 milliGRAM(s) Oral at bedtime  budesonide 160 MICROgram(s)/formoterol 4.5 MICROgram(s) Inhaler 2 Puff(s) Inhalation two times a day  buMETAnide Injectable 1 milliGRAM(s) IV Push every 12 hours  ceFAZolin   IVPB 1000 milliGRAM(s) IV Intermittent every 12 hours  chlorhexidine 2% Cloths 1 Application(s) Topical daily  clopidogrel Tablet 75 milliGRAM(s) Oral daily  dextrose 5%. 1000 milliLiter(s) IV Continuous <Continuous>  dextrose 50% Injectable 25 Gram(s) IV Push once  dextrose Oral Gel 15 Gram(s) Oral once PRN  glucagon  Injectable 1 milliGRAM(s) IntraMuscular once  heparin   Injectable 5000 Unit(s) SubCutaneous every 12 hours  insulin lispro (ADMELOG) corrective regimen sliding scale   SubCutaneous three times a day before meals  levothyroxine 25 MICROGram(s) Oral daily  melatonin 5 milliGRAM(s) Oral at bedtime  pantoprazole  Injectable 40 milliGRAM(s) IV Push daily      PHYSICAL EXAM:  T(C): 36.2 (11-12-23 @ 20:00), Max: 36.2 (11-12-23 @ 20:00)  T(F): 97.1 (11-12-23 @ 20:00), Max: 97.1 (11-12-23 @ 20:00)  HR: 104 (11-13-23 @ 06:00) (95 - 111)  BP: --  RR: 22 (11-13-23 @ 06:00) (17 - 25)  SpO2: 98% (11-13-23 @ 06:00) (93% - 98%)  Height (cm): 149.9 (11-10-23 @ 22:00)  Weight (kg): 108 (11-10-23 @ 22:00)  BSA (m2): 1.99 (11-10-23 @ 22:00)    11-10-23 @ 07:01  -  11-11-23 @ 07:00  --------------------------------------------------------  IN: 112.4 mL / OUT: 550 mL / NET: -437.6 mL    11-11-23 @ 07:01  -  11-12-23 @ 07:00  --------------------------------------------------------  IN: 673.2 mL / OUT: 5855 mL / NET: -5181.8 mL    11-12-23 @ 07:01  -  11-13-23 @ 07:00  --------------------------------------------------------  IN: 1456 mL / OUT: 7655 mL / NET: -6199 mL      I&O's Detail    12 Nov 2023 07:01  -  13 Nov 2023 07:00  --------------------------------------------------------  IN:    Bumetanide: 15 mL    DOBUTamine: 81 mL    Oral Fluid: 1360 mL  Total IN: 1456 mL    OUT:    Indwelling Catheter - Urethral (mL): 7655 mL  Total OUT: 7655 mL    Total NET: -6199 mL          ABP: 137/67 (11-13-23 @ 06:00)  ABP(mean): 90 (11-13-23 @ 06:00)  CVP(mm Hg): --  CVP(cm H2O): --  CO: --  CI: --  PA: --  PA(mean): --  PA(direct): --  PCWP: --  LA: --  RA: --  SVR: --  SVRI: --  PVR: --  PVRI: --    General Appearance: no acute distress	  Cardiovascular: regular rate and rhythm S1 S2, No JVD, No murmurs  Respiratory: clear bilaterally  Gastrointestinal:  soft, non-tender  Skin/Integumen: no edema  Neurologic: non focal  Musculoskeletal/ extremities: warm  Vascular: Peripheral pulses palpable 2+ bilaterally    LABS:	 	                        10.5   5.95  )-----------( 175      ( 13 Nov 2023 04:35 )             34.4     11-13    141  |  88<L>  |  44<H>  ----------------------------<  140<H>  3.6   |  41<HH>  |  1.5    Ca    8.8      13 Nov 2023 04:35  Phos  2.7     11-13  Mg     1.3     11-13    TPro  7.5  /  Alb  3.6  /  TBili  2.1<H>  /  DBili  x   /  AST  34  /  ALT  28  /  AlkPhos  177<H>  11-13              Culture - Blood (collected 10 Nov 2023 14:28)  Source: .Blood Blood-Peripheral  Preliminary Report (12 Nov 2023 23:01):    No growth at 48 Hours    Culture - Blood (collected 10 Nov 2023 14:28)  Source: .Blood Blood-Peripheral  Preliminary Report (12 Nov 2023 23:01):    No growth at 48 Hours        CARDIAC TESTING:  ECG:  	  Telemetry:  Echocardiogram: LVEF 25-30%, low normal RV function, severely dilated RV, moderate-severe TR,   Coronary angiogram:  RHC:  Stress test:  cMRI:    OTHER DIAGNOSTIC TESTING:  CXR:  CT:   Cardiology Attending Note    WILLIAN MARY  MRN-[id number]    Date of Admission: 11-10-23    Brief HPI: 59 y/o female mixed cardiomyopathy LVEF 20% w/ ICD, CAD PCI of RCA in 2021, severe pHTN class II/III, severe PAD, COPD on home O2, CVA (bedbound) presenting with ADH, JEMIMA on CKD, respiratory failure. Precipitating factor possibly 2/2 LLE cellulitis      24 hr events:  NAEON. no complaints    acetaminophen     Tablet .. 650 milliGRAM(s) Oral every 6 hours PRN  albuterol/ipratropium for Nebulization 3 milliLiter(s) Nebulizer every 6 hours  atorvastatin 40 milliGRAM(s) Oral at bedtime  budesonide 160 MICROgram(s)/formoterol 4.5 MICROgram(s) Inhaler 2 Puff(s) Inhalation two times a day  buMETAnide Injectable 1 milliGRAM(s) IV Push every 12 hours  ceFAZolin   IVPB 1000 milliGRAM(s) IV Intermittent every 12 hours  chlorhexidine 2% Cloths 1 Application(s) Topical daily  clopidogrel Tablet 75 milliGRAM(s) Oral daily  dextrose 5%. 1000 milliLiter(s) IV Continuous <Continuous>  dextrose 50% Injectable 25 Gram(s) IV Push once  dextrose Oral Gel 15 Gram(s) Oral once PRN  glucagon  Injectable 1 milliGRAM(s) IntraMuscular once  heparin   Injectable 5000 Unit(s) SubCutaneous every 12 hours  insulin lispro (ADMELOG) corrective regimen sliding scale   SubCutaneous three times a day before meals  levothyroxine 25 MICROGram(s) Oral daily  melatonin 5 milliGRAM(s) Oral at bedtime  pantoprazole  Injectable 40 milliGRAM(s) IV Push daily      PHYSICAL EXAM:  T(C): 36.2 (11-12-23 @ 20:00), Max: 36.2 (11-12-23 @ 20:00)  T(F): 97.1 (11-12-23 @ 20:00), Max: 97.1 (11-12-23 @ 20:00)  HR: 104 (11-13-23 @ 06:00) (95 - 111)  BP: --  RR: 22 (11-13-23 @ 06:00) (17 - 25)  SpO2: 98% (11-13-23 @ 06:00) (93% - 98%)  Height (cm): 149.9 (11-10-23 @ 22:00)  Weight (kg): 108 (11-10-23 @ 22:00)  BSA (m2): 1.99 (11-10-23 @ 22:00)    11-10-23 @ 07:01  -  11-11-23 @ 07:00  --------------------------------------------------------  IN: 112.4 mL / OUT: 550 mL / NET: -437.6 mL    11-11-23 @ 07:01  -  11-12-23 @ 07:00  --------------------------------------------------------  IN: 673.2 mL / OUT: 5855 mL / NET: -5181.8 mL    11-12-23 @ 07:01  -  11-13-23 @ 07:00  --------------------------------------------------------  IN: 1456 mL / OUT: 7655 mL / NET: -6199 mL      I&O's Detail    12 Nov 2023 07:01  -  13 Nov 2023 07:00  --------------------------------------------------------  IN:    Bumetanide: 15 mL    DOBUTamine: 81 mL    Oral Fluid: 1360 mL  Total IN: 1456 mL    OUT:    Indwelling Catheter - Urethral (mL): 7655 mL  Total OUT: 7655 mL    Total NET: -6199 mL    ABP: 137/67 (11-13-23 @ 06:00)  ABP(mean): 90 (11-13-23 @ 06:00)    General Appearance: lying in bed, no acute distress	  Cardiovascular: regular rate and rhythm S1 S2, No JVD, No murmurs  Respiratory: clear bilaterally  Gastrointestinal:  soft, non-tender  Skin/Integumen: no edema  Neurologic: non focal  Musculoskeletal/ extremities: warm  Vascular: Peripheral pulses palpable 2+ bilaterally    LABS:	 	                        10.5   5.95  )-----------( 175      ( 13 Nov 2023 04:35 )             34.4     11-13    141  |  88<L>  |  44<H>  ----------------------------<  140<H>  3.6   |  41<HH>  |  1.5    Ca    8.8      13 Nov 2023 04:35  Phos  2.7     11-13  Mg     1.3     11-13    TPro  7.5  /  Alb  3.6  /  TBili  2.1<H>  /  DBili  x   /  AST  34  /  ALT  28  /  AlkPhos  177<H>  11-13              Culture - Blood (collected 10 Nov 2023 14:28)  Source: .Blood Blood-Peripheral  Preliminary Report (12 Nov 2023 23:01):    No growth at 48 Hours    Culture - Blood (collected 10 Nov 2023 14:28)  Source: .Blood Blood-Peripheral  Preliminary Report (12 Nov 2023 23:01):    No growth at 48 Hours        CARDIAC TESTING:  ECG:  	  Telemetry:  Echocardiogram: LVEF 25-30%, low normal RV function, severely dilated RV, moderate-severe TR,   Coronary angiogram:  RHC:  Stress test:  cMRI:    OTHER DIAGNOSTIC TESTING:  CXR: bilateral pulmonary edema  CT:   Cardiology Attending Note    WILLIAN MARY  [id number]    Date of Admission: 11-10-23    Brief HPI: 59 y/o female mixed cardiomyopathy LVEF 20% w/ ICD, CAD PCI of RCA in 2021, severe pHTN class II/III, severe PAD, COPD on home O2, CVA (bedbound) presenting with ADH, JEMIMA on CKD, respiratory failure. Precipitating factor possibly 2/2 LLE cellulitis or medication noncompliance.    24 hr events:  NAEON. no complaints. feeling better    acetaminophen     Tablet .. 650 milliGRAM(s) Oral every 6 hours PRN  albuterol/ipratropium for Nebulization 3 milliLiter(s) Nebulizer every 6 hours  atorvastatin 40 milliGRAM(s) Oral at bedtime  budesonide 160 MICROgram(s)/formoterol 4.5 MICROgram(s) Inhaler 2 Puff(s) Inhalation two times a day  buMETAnide Injectable 1 milliGRAM(s) IV Push every 12 hours  ceFAZolin   IVPB 1000 milliGRAM(s) IV Intermittent every 12 hours  chlorhexidine 2% Cloths 1 Application(s) Topical daily  clopidogrel Tablet 75 milliGRAM(s) Oral daily  dextrose 5%. 1000 milliLiter(s) IV Continuous <Continuous>  dextrose 50% Injectable 25 Gram(s) IV Push once  dextrose Oral Gel 15 Gram(s) Oral once PRN  glucagon  Injectable 1 milliGRAM(s) IntraMuscular once  heparin   Injectable 5000 Unit(s) SubCutaneous every 12 hours  insulin lispro (ADMELOG) corrective regimen sliding scale   SubCutaneous three times a day before meals  levothyroxine 25 MICROGram(s) Oral daily  melatonin 5 milliGRAM(s) Oral at bedtime  pantoprazole  Injectable 40 milliGRAM(s) IV Push daily      PHYSICAL EXAM:  T(C): 36.2 (11-12-23 @ 20:00), Max: 36.2 (11-12-23 @ 20:00)  T(F): 97.1 (11-12-23 @ 20:00), Max: 97.1 (11-12-23 @ 20:00)  HR: 104 (11-13-23 @ 06:00) (95 - 111)  BP: --  RR: 22 (11-13-23 @ 06:00) (17 - 25)  SpO2: 98% (11-13-23 @ 06:00) (93% - 98%)  Height (cm): 149.9 (11-10-23 @ 22:00)  Weight (kg): 108 (11-10-23 @ 22:00)  BSA (m2): 1.99 (11-10-23 @ 22:00)    11-10-23 @ 07:01  -  11-11-23 @ 07:00  --------------------------------------------------------  IN: 112.4 mL / OUT: 550 mL / NET: -437.6 mL    11-11-23 @ 07:01  -  11-12-23 @ 07:00  --------------------------------------------------------  IN: 673.2 mL / OUT: 5855 mL / NET: -5181.8 mL    11-12-23 @ 07:01  -  11-13-23 @ 07:00  --------------------------------------------------------  IN: 1456 mL / OUT: 7655 mL / NET: -6199 mL      I&O's Detail    12 Nov 2023 07:01  -  13 Nov 2023 07:00  --------------------------------------------------------  IN:    Bumetanide: 15 mL    DOBUTamine: 81 mL    Oral Fluid: 1360 mL  Total IN: 1456 mL    OUT:    Indwelling Catheter - Urethral (mL): 7655 mL  Total OUT: 7655 mL    Total NET: -6199 mL    ABP: 137/67 (11-13-23 @ 06:00)  ABP(mean): 90 (11-13-23 @ 06:00)    General Appearance: lying in bed, no acute distress	  Cardiovascular: regular rate and rhythm S1 S2, JVP ~15cm  Respiratory: bibasilar crackles  Gastrointestinal:  soft, non-tender  Skin/Integumen: +2 edema  Neurologic: non focal  Musculoskeletal/ extremities: warm, slight erythema bilaterally    LABS:	 	                        10.5   5.95  )-----------( 175      ( 13 Nov 2023 04:35 )             34.4     11-13    141  |  88<L>  |  44<H>  ----------------------------<  140<H>  3.6   |  41<HH>  |  1.5    Ca    8.8      13 Nov 2023 04:35  Phos  2.7     11-13  Mg     1.3     11-13    TPro  7.5  /  Alb  3.6  /  TBili  2.1<H>  /  DBili  x   /  AST  34  /  ALT  28  /  AlkPhos  177<H>  11-13              Culture - Blood (collected 10 Nov 2023 14:28)  Source: .Blood Blood-Peripheral  Preliminary Report (12 Nov 2023 23:01):    No growth at 48 Hours    Culture - Blood (collected 10 Nov 2023 14:28)  Source: .Blood Blood-Peripheral  Preliminary Report (12 Nov 2023 23:01):    No growth at 48 Hours        CARDIAC TESTING:  ECG:  	  Telemetry:  Echocardiogram: LVEF 25-30%, low normal RV function, severely dilated RV, moderate-severe TR,   Coronary angiogram:  RHC:  Stress test:  cMRI:    OTHER DIAGNOSTIC TESTING:  CXR: bilateral pulmonary edema  CT:

## 2023-11-13 NOTE — PROGRESS NOTE ADULT - SUBJECTIVE AND OBJECTIVE BOX
CHIEF COMPLAINT:  Patient is a 58y old  Female who presents with a chief complaint of Weakness (13 Nov 2023 08:07)      INTERVAL HISTORY/OVERNIGHT EVENTS:  3 runs of NSVT overnight  Otherwise no other events  Patient feels better today    ======================  MEDICATIONS:  albuterol/ipratropium for Nebulization 3 milliLiter(s) Nebulizer every 6 hours  atorvastatin 40 milliGRAM(s) Oral at bedtime  budesonide 160 MICROgram(s)/formoterol 4.5 MICROgram(s) Inhaler 2 Puff(s) Inhalation two times a day  buMETAnide Injectable 1 milliGRAM(s) IV Push every 12 hours  ceFAZolin   IVPB 1000 milliGRAM(s) IV Intermittent every 12 hours  chlorhexidine 2% Cloths 1 Application(s) Topical daily  clopidogrel Tablet 75 milliGRAM(s) Oral daily  heparin   Injectable 5000 Unit(s) SubCutaneous every 12 hours  insulin lispro (ADMELOG) corrective regimen sliding scale   SubCutaneous three times a day before meals  levothyroxine 25 MICROGram(s) Oral daily  magnesium sulfate  IVPB 2 Gram(s) IV Intermittent every 2 hours  melatonin 5 milliGRAM(s) Oral at bedtime  pantoprazole    Tablet 40 milliGRAM(s) Oral before breakfast  spironolactone 25 milliGRAM(s) Oral daily    DRIPS:  dextrose 5%. 1000 milliLiter(s) (50 mL/Hr) IV Continuous <Continuous>    PRN:       ======================  PHYSICAL EXAMINATION:  GEN:  nad.   HEENT:  eomi. ncat  PULM:  b/l lung sounds, mild crackles at the bases  CARD: s1, s2  ABD: +bs. ntnd  EXT:  b/l pitting edema, mild erythema noted to lle, tender on palpation  NEURO:  no new focal deficits.   ======================  OBJECTIVE:        VS:  T(F): 98.1 (11-13 @ 12:00), Max: 98.1 (11-13 @ 08:00)  HR: 101 (11-13 @ 13:00) (100 - 111)  BP: 127/66 (11-13 @ 13:00) (127/66 - 132/74)  RR: 19 (11-13 @ 13:00) (17 - 25)  SpO2: 93% (11-13 @ 13:00) (93% - 98%)  CVP(mm Hg): --  CO: --  CI: --  PA: --  PCWP: --    I/O:      11-10 @ 07:01  -  11-11 @ 07:00  --------------------------------------------------------  IN: 112.4 mL / OUT: 550 mL / NET: -437.6 mL    11-11 @ 07:01  -  11-12 @ 07:00  --------------------------------------------------------  IN: 673.2 mL / OUT: 5855 mL / NET: -5181.8 mL    11-12 @ 07:01  -  11-13 @ 07:00  --------------------------------------------------------  IN: 1456 mL / OUT: 7655 mL / NET: -6199 mL    11-13 @ 07:01  -  11-13 @ 13:30  --------------------------------------------------------  IN: 711 mL / OUT: 2340 mL / NET: -1629 mL        Weight trend:  Weight (kg): 108 (11-10)    ======================    LABS:                          10.5   5.95  )-----------( 175      ( 13 Nov 2023 04:35 )             34.4     11-13    141  |  88<L>  |  44<H>  ----------------------------<  140<H>  3.6   |  41<HH>  |  1.5    Ca    8.8      13 Nov 2023 04:35  Phos  2.7     11-13  Mg     1.3     11-13    TPro  7.5  /  Alb  3.6  /  TBili  2.1<H>  /  DBili  x   /  AST  34  /  ALT  28  /  AlkPhos  177<H>  11-13    LIVER FUNCTIONS - ( 13 Nov 2023 04:35 )  Alb: 3.6 g/dL / Pro: 7.5 g/dL / ALK PHOS: 177 U/L / ALT: 28 U/L / AST: 34 U/L / GGT: x                     Urinalysis Basic - ( 13 Nov 2023 04:35 )    Color: x / Appearance: x / SG: x / pH: x  Gluc: 140 mg/dL / Ketone: x  / Bili: x / Urobili: x   Blood: x / Protein: x / Nitrite: x   Leuk Esterase: x / RBC: x / WBC x   Sq Epi: x / Non Sq Epi: x / Bacteria: x        Cultures:    Culture - Blood (collected 11-10)  Source: .Blood Blood-Peripheral  Preliminary Report:    No growth at 48 Hours    Culture - Blood (collected 11-10)  Source: .Blood Blood-Peripheral  Preliminary Report:    No growth at 48 Hours

## 2023-11-13 NOTE — PROGRESS NOTE ADULT - ASSESSMENT
57 y/o female mixed cardiomyopathy LVEF 20% w/ ICD, CAD PCI of RCA in 2021, severe pHTN class II/III, severe PAD, COPD on home O2, CVA (bedbound) presenting with ADH, JEMIMA on CKD, respiratory failure. Precipitating factor possibly 2/2 LLE cellulitis and/or medication noncompliacne.     #NICM/ICM  #HFrEF  #ADHF  #Severe pHTN class II/III  #Severe PAD  #LLE cellulitis  #Acute hypoxic/hypercarbic respiratory failure  #Severe COPD  #AKD on CKD    Plan    CNS:   -Avoid neurotoxins    Pulm:  -On 4L NC, which is baseline O2 requirement  -C/w duonebs q6, budesonide BID for COPD  -Head of bed 45 degrees    Cardio:  -Complete medrec, possible restarting of home GDMT  -Start spironolactone 25 PO QD  -C/w diuresis, bumetanide 1mg q12, as patient still appears volume overloaded  -C/w clopidogrel, atorvastatin for h/o CAD    Renal:  -Kidney function returning to baseline, Cr 1.5, BUN 44  -Replete Magnesium, 1.4 this am  -Continue to monitor I&Os, electrolyte levels    GI:  -GI ppx, pantoprazole QD  -patient evaluated by s/s yesterday, diet soft and bite sized, mildly thick liquids, no straws    Endocrine:  -Insulin sliding scale for DM    Infectious:  -C/w cefazolin 1g q12 for suspected cellulitis of LLE, set to complete 5 day course    MSK:  -LLE dvt scan with superficial occlusions but reconstitution of flow    Lines:  a-line: discontinue  blandon  peripheral IV    Dispo: CCU, possible downgrade in next few days pending response with GDMT

## 2023-11-14 LAB
ALBUMIN SERPL ELPH-MCNC: 3.5 G/DL — SIGNIFICANT CHANGE UP (ref 3.5–5.2)
ALBUMIN SERPL ELPH-MCNC: 3.5 G/DL — SIGNIFICANT CHANGE UP (ref 3.5–5.2)
ALP SERPL-CCNC: 164 U/L — HIGH (ref 30–115)
ALP SERPL-CCNC: 164 U/L — HIGH (ref 30–115)
ALT FLD-CCNC: 17 U/L — SIGNIFICANT CHANGE UP (ref 0–41)
ALT FLD-CCNC: 17 U/L — SIGNIFICANT CHANGE UP (ref 0–41)
ANION GAP SERPL CALC-SCNC: 11 MMOL/L — SIGNIFICANT CHANGE UP (ref 7–14)
ANION GAP SERPL CALC-SCNC: 11 MMOL/L — SIGNIFICANT CHANGE UP (ref 7–14)
AST SERPL-CCNC: 26 U/L — SIGNIFICANT CHANGE UP (ref 0–41)
AST SERPL-CCNC: 26 U/L — SIGNIFICANT CHANGE UP (ref 0–41)
BASOPHILS # BLD AUTO: 0.05 K/UL — SIGNIFICANT CHANGE UP (ref 0–0.2)
BASOPHILS # BLD AUTO: 0.05 K/UL — SIGNIFICANT CHANGE UP (ref 0–0.2)
BASOPHILS NFR BLD AUTO: 0.8 % — SIGNIFICANT CHANGE UP (ref 0–1)
BASOPHILS NFR BLD AUTO: 0.8 % — SIGNIFICANT CHANGE UP (ref 0–1)
BILIRUB SERPL-MCNC: 1.9 MG/DL — HIGH (ref 0.2–1.2)
BILIRUB SERPL-MCNC: 1.9 MG/DL — HIGH (ref 0.2–1.2)
BUN SERPL-MCNC: 33 MG/DL — HIGH (ref 10–20)
BUN SERPL-MCNC: 33 MG/DL — HIGH (ref 10–20)
CALCIUM SERPL-MCNC: 8.8 MG/DL — SIGNIFICANT CHANGE UP (ref 8.4–10.5)
CALCIUM SERPL-MCNC: 8.8 MG/DL — SIGNIFICANT CHANGE UP (ref 8.4–10.5)
CHLORIDE SERPL-SCNC: 89 MMOL/L — LOW (ref 98–110)
CHLORIDE SERPL-SCNC: 89 MMOL/L — LOW (ref 98–110)
CO2 SERPL-SCNC: 41 MMOL/L — CRITICAL HIGH (ref 17–32)
CO2 SERPL-SCNC: 41 MMOL/L — CRITICAL HIGH (ref 17–32)
CREAT SERPL-MCNC: 1.3 MG/DL — SIGNIFICANT CHANGE UP (ref 0.7–1.5)
CREAT SERPL-MCNC: 1.3 MG/DL — SIGNIFICANT CHANGE UP (ref 0.7–1.5)
EGFR: 48 ML/MIN/1.73M2 — LOW
EGFR: 48 ML/MIN/1.73M2 — LOW
EOSINOPHIL # BLD AUTO: 0.19 K/UL — SIGNIFICANT CHANGE UP (ref 0–0.7)
EOSINOPHIL # BLD AUTO: 0.19 K/UL — SIGNIFICANT CHANGE UP (ref 0–0.7)
EOSINOPHIL NFR BLD AUTO: 3.1 % — SIGNIFICANT CHANGE UP (ref 0–8)
EOSINOPHIL NFR BLD AUTO: 3.1 % — SIGNIFICANT CHANGE UP (ref 0–8)
GLUCOSE BLDC GLUCOMTR-MCNC: 148 MG/DL — HIGH (ref 70–99)
GLUCOSE BLDC GLUCOMTR-MCNC: 148 MG/DL — HIGH (ref 70–99)
GLUCOSE BLDC GLUCOMTR-MCNC: 155 MG/DL — HIGH (ref 70–99)
GLUCOSE BLDC GLUCOMTR-MCNC: 155 MG/DL — HIGH (ref 70–99)
GLUCOSE SERPL-MCNC: 130 MG/DL — HIGH (ref 70–99)
GLUCOSE SERPL-MCNC: 130 MG/DL — HIGH (ref 70–99)
HCT VFR BLD CALC: 36.2 % — LOW (ref 37–47)
HCT VFR BLD CALC: 36.2 % — LOW (ref 37–47)
HGB BLD-MCNC: 10.8 G/DL — LOW (ref 12–16)
HGB BLD-MCNC: 10.8 G/DL — LOW (ref 12–16)
IMM GRANULOCYTES NFR BLD AUTO: 1.3 % — HIGH (ref 0.1–0.3)
IMM GRANULOCYTES NFR BLD AUTO: 1.3 % — HIGH (ref 0.1–0.3)
LYMPHOCYTES # BLD AUTO: 1.16 K/UL — LOW (ref 1.2–3.4)
LYMPHOCYTES # BLD AUTO: 1.16 K/UL — LOW (ref 1.2–3.4)
LYMPHOCYTES # BLD AUTO: 18.9 % — LOW (ref 20.5–51.1)
LYMPHOCYTES # BLD AUTO: 18.9 % — LOW (ref 20.5–51.1)
MCHC RBC-ENTMCNC: 26.2 PG — LOW (ref 27–31)
MCHC RBC-ENTMCNC: 26.2 PG — LOW (ref 27–31)
MCHC RBC-ENTMCNC: 29.8 G/DL — LOW (ref 32–37)
MCHC RBC-ENTMCNC: 29.8 G/DL — LOW (ref 32–37)
MCV RBC AUTO: 87.9 FL — SIGNIFICANT CHANGE UP (ref 81–99)
MCV RBC AUTO: 87.9 FL — SIGNIFICANT CHANGE UP (ref 81–99)
MONOCYTES # BLD AUTO: 0.83 K/UL — HIGH (ref 0.1–0.6)
MONOCYTES # BLD AUTO: 0.83 K/UL — HIGH (ref 0.1–0.6)
MONOCYTES NFR BLD AUTO: 13.5 % — HIGH (ref 1.7–9.3)
MONOCYTES NFR BLD AUTO: 13.5 % — HIGH (ref 1.7–9.3)
NEUTROPHILS # BLD AUTO: 3.82 K/UL — SIGNIFICANT CHANGE UP (ref 1.4–6.5)
NEUTROPHILS # BLD AUTO: 3.82 K/UL — SIGNIFICANT CHANGE UP (ref 1.4–6.5)
NEUTROPHILS NFR BLD AUTO: 62.4 % — SIGNIFICANT CHANGE UP (ref 42.2–75.2)
NEUTROPHILS NFR BLD AUTO: 62.4 % — SIGNIFICANT CHANGE UP (ref 42.2–75.2)
NRBC # BLD: 0 /100 WBCS — SIGNIFICANT CHANGE UP (ref 0–0)
NRBC # BLD: 0 /100 WBCS — SIGNIFICANT CHANGE UP (ref 0–0)
PLATELET # BLD AUTO: 155 K/UL — SIGNIFICANT CHANGE UP (ref 130–400)
PLATELET # BLD AUTO: 155 K/UL — SIGNIFICANT CHANGE UP (ref 130–400)
PMV BLD: 10.3 FL — SIGNIFICANT CHANGE UP (ref 7.4–10.4)
PMV BLD: 10.3 FL — SIGNIFICANT CHANGE UP (ref 7.4–10.4)
POTASSIUM SERPL-MCNC: 3.6 MMOL/L — SIGNIFICANT CHANGE UP (ref 3.5–5)
POTASSIUM SERPL-MCNC: 3.6 MMOL/L — SIGNIFICANT CHANGE UP (ref 3.5–5)
POTASSIUM SERPL-SCNC: 3.6 MMOL/L — SIGNIFICANT CHANGE UP (ref 3.5–5)
POTASSIUM SERPL-SCNC: 3.6 MMOL/L — SIGNIFICANT CHANGE UP (ref 3.5–5)
PROT SERPL-MCNC: 7.3 G/DL — SIGNIFICANT CHANGE UP (ref 6–8)
PROT SERPL-MCNC: 7.3 G/DL — SIGNIFICANT CHANGE UP (ref 6–8)
RBC # BLD: 4.12 M/UL — LOW (ref 4.2–5.4)
RBC # BLD: 4.12 M/UL — LOW (ref 4.2–5.4)
RBC # FLD: 22.1 % — HIGH (ref 11.5–14.5)
RBC # FLD: 22.1 % — HIGH (ref 11.5–14.5)
SODIUM SERPL-SCNC: 141 MMOL/L — SIGNIFICANT CHANGE UP (ref 135–146)
SODIUM SERPL-SCNC: 141 MMOL/L — SIGNIFICANT CHANGE UP (ref 135–146)
T4 FREE SERPL-MCNC: 1.2 NG/DL — SIGNIFICANT CHANGE UP (ref 0.9–1.8)
T4 FREE SERPL-MCNC: 1.2 NG/DL — SIGNIFICANT CHANGE UP (ref 0.9–1.8)
T4 FREE+ TSH PNL SERPL: 9.24 UIU/ML — HIGH (ref 0.27–4.2)
T4 FREE+ TSH PNL SERPL: 9.24 UIU/ML — HIGH (ref 0.27–4.2)
WBC # BLD: 6.13 K/UL — SIGNIFICANT CHANGE UP (ref 4.8–10.8)
WBC # BLD: 6.13 K/UL — SIGNIFICANT CHANGE UP (ref 4.8–10.8)
WBC # FLD AUTO: 6.13 K/UL — SIGNIFICANT CHANGE UP (ref 4.8–10.8)
WBC # FLD AUTO: 6.13 K/UL — SIGNIFICANT CHANGE UP (ref 4.8–10.8)

## 2023-11-14 PROCEDURE — 99291 CRITICAL CARE FIRST HOUR: CPT

## 2023-11-14 PROCEDURE — 71045 X-RAY EXAM CHEST 1 VIEW: CPT | Mod: 26

## 2023-11-14 PROCEDURE — 93010 ELECTROCARDIOGRAM REPORT: CPT

## 2023-11-14 RX ORDER — POTASSIUM CHLORIDE 20 MEQ
40 PACKET (EA) ORAL ONCE
Refills: 0 | Status: COMPLETED | OUTPATIENT
Start: 2023-11-14 | End: 2023-11-14

## 2023-11-14 RX ORDER — METOPROLOL TARTRATE 50 MG
25 TABLET ORAL DAILY
Refills: 0 | Status: DISCONTINUED | OUTPATIENT
Start: 2023-11-14 | End: 2023-11-15

## 2023-11-14 RX ORDER — ACETAZOLAMIDE 250 MG/1
250 TABLET ORAL ONCE
Refills: 0 | Status: COMPLETED | OUTPATIENT
Start: 2023-11-14 | End: 2023-11-14

## 2023-11-14 RX ADMIN — HEPARIN SODIUM 5000 UNIT(S): 5000 INJECTION INTRAVENOUS; SUBCUTANEOUS at 17:19

## 2023-11-14 RX ADMIN — BUDESONIDE AND FORMOTEROL FUMARATE DIHYDRATE 2 PUFF(S): 160; 4.5 AEROSOL RESPIRATORY (INHALATION) at 08:36

## 2023-11-14 RX ADMIN — CLOPIDOGREL BISULFATE 75 MILLIGRAM(S): 75 TABLET, FILM COATED ORAL at 12:08

## 2023-11-14 RX ADMIN — Medication 40 MILLIEQUIVALENT(S): at 12:08

## 2023-11-14 RX ADMIN — SPIRONOLACTONE 25 MILLIGRAM(S): 25 TABLET, FILM COATED ORAL at 06:04

## 2023-11-14 RX ADMIN — Medication 100 MILLIGRAM(S): at 17:19

## 2023-11-14 RX ADMIN — Medication 25 MICROGRAM(S): at 06:04

## 2023-11-14 RX ADMIN — CHLORHEXIDINE GLUCONATE 1 APPLICATION(S): 213 SOLUTION TOPICAL at 12:09

## 2023-11-14 RX ADMIN — HEPARIN SODIUM 5000 UNIT(S): 5000 INJECTION INTRAVENOUS; SUBCUTANEOUS at 06:04

## 2023-11-14 RX ADMIN — Medication 3 MILLILITER(S): at 22:00

## 2023-11-14 RX ADMIN — ACETAZOLAMIDE 105 MILLIGRAM(S): 250 TABLET ORAL at 12:45

## 2023-11-14 RX ADMIN — ATORVASTATIN CALCIUM 40 MILLIGRAM(S): 80 TABLET, FILM COATED ORAL at 21:52

## 2023-11-14 RX ADMIN — Medication 5 MILLIGRAM(S): at 21:52

## 2023-11-14 RX ADMIN — Medication 1: at 12:08

## 2023-11-14 RX ADMIN — PANTOPRAZOLE SODIUM 40 MILLIGRAM(S): 20 TABLET, DELAYED RELEASE ORAL at 06:04

## 2023-11-14 RX ADMIN — BUMETANIDE 1 MILLIGRAM(S): 0.25 INJECTION INTRAMUSCULAR; INTRAVENOUS at 06:04

## 2023-11-14 RX ADMIN — Medication 1: at 08:30

## 2023-11-14 RX ADMIN — Medication 25 MILLIGRAM(S): at 12:07

## 2023-11-14 RX ADMIN — Medication 100 MILLIGRAM(S): at 06:45

## 2023-11-14 RX ADMIN — BUDESONIDE AND FORMOTEROL FUMARATE DIHYDRATE 2 PUFF(S): 160; 4.5 AEROSOL RESPIRATORY (INHALATION) at 19:30

## 2023-11-14 NOTE — PROGRESS NOTE ADULT - ASSESSMENT
57 y/o female mixed cardiomyopathy LVEF 20% w/ ICD, CAD PCI of RCA in 2021, severe pHTN class II/III, severe PAD, COPD on home O2, CVA (bedbound) presenting with ADH, JEMIMA on CKD, respiratory failure. Precipitating factor possibly 2/2 LLE cellulitis and/or medication noncompliacne.     #NICM/ICM  #HFrEF  #ADHF  #Severe pHTN class II/III  #Severe PAD  #LLE cellulitis  #Acute hypoxic/hypercarbic respiratory failure  #Severe COPD  #AKD on CKD    Plan    CNS:   -Avoid neurotoxins    Pulm:  -On 4L NC, which is baseline O2 requirement  -C/w duonebs q6, budesonide BID for COPD  -Head of bed 45 degrees    Cardio:  -Complete medrec, possible restarting of home GDMT  -Start spironolactone 25 PO QD  -C/w diuresis, bumetanide 1mg q12, as patient still appears volume overloaded  -C/w clopidogrel, atorvastatin for h/o CAD    Renal:  -Kidney function returning to baseline, Cr 1.5, BUN 44  -Replete Magnesium, 1.4 this am  -Continue to monitor I&Os, electrolyte levels    GI:  -GI ppx, pantoprazole QD  -patient evaluated by s/s yesterday, diet soft and bite sized, mildly thick liquids, no straws    Endocrine:  -Insulin sliding scale for DM    Infectious:  -C/w cefazolin 1g q12 for suspected cellulitis of LLE, set to complete 5 day course    MSK:  -LLE dvt scan with superficial occlusions but reconstitution of flow    Lines:  a-line: discontinue  blandon  peripheral IV    Dispo: CCU, possible downgrade in next few days pending response with GDMT 59 y/o female mixed cardiomyopathy LVEF 20% w/ ICD, CAD PCI of RCA in 2021, severe pHTN class II/III, severe PAD, COPD on home O2, CVA (bedbound) presenting with ADH, JEMIMA on CKD, respiratory failure. Precipitating factor possibly 2/2 LLE cellulitis and/or medication noncompliacne.     #NICM/ICM  #HFrEF  #ADHF  #Severe pHTN class II/III  #Severe PAD  #LLE cellulitis  #Acute hypoxic/hypercarbic respiratory failure  #Severe COPD  #AKD on CKD    Plan    CNS:   -Avoid neurotoxins    Pulm:  -On 4L NC, which is baseline O2 requirement  -C/w duonebs q6, budesonide BID for COPD  -Head of bed 45 degrees    Cardio:  -2.5L urine output last shift, net negative 6L for the day  -Discontinue Bumex, but start with Diamox to continue diuresis and to address contraction alkalosis, CO2 41 this am  -Start Metoprolol Succinate 25 as part of GDMT and to address runs of NSVT  -C/w spironolactone 25 PO QD  -C/w clopidogrel, atorvastatin for h/o CAD    Renal:  -Kidney function returning to baseline, Cr 1.3, BUN 33  -Replete Potassium, K>4  -Continue to monitor I&Os, electrolyte levels    GI:  -GI ppx, pantoprazole QD  -C/w diet soft and bite sized, mildly thick liquids, no straws    Endocrine:  -Insulin sliding scale for DM    Infectious:  -C/w cefazolin 1g q12 for suspected cellulitis of LLE, set to complete 5 day course    MSK:  -LLE dvt scan with superficial occlusions but reconstitution of flow    Lines:  blandon  peripheral IV    Dispo: CCU, possible downgrade in next few days pending response with GDMT

## 2023-11-14 NOTE — PROGRESS NOTE ADULT - SUBJECTIVE AND OBJECTIVE BOX
Cardiology Attending Note    WILLIAN MARY  MRN-[id number]    Date of Admission: 11-10-23    Brief HPI: 59 y/o female mixed cardiomyopathy LVEF 20% w/ ICD, CAD PCI of RCA in 2021, severe pHTN class II/III, severe PAD, COPD on home O2, CVA (bedbound) presenting with ADH, JEMIMA on CKD, respiratory failure. Precipitating factor possibly 2/2 LLE cellulitis or medication noncompliance.    24 hr events:    acetaminophen     Tablet .. 650 milliGRAM(s) Oral every 6 hours PRN  albuterol/ipratropium for Nebulization 3 milliLiter(s) Nebulizer every 6 hours  atorvastatin 40 milliGRAM(s) Oral at bedtime  budesonide 160 MICROgram(s)/formoterol 4.5 MICROgram(s) Inhaler 2 Puff(s) Inhalation two times a day  buMETAnide Injectable 1 milliGRAM(s) IV Push every 12 hours  ceFAZolin   IVPB 1000 milliGRAM(s) IV Intermittent every 12 hours  chlorhexidine 2% Cloths 1 Application(s) Topical daily  clopidogrel Tablet 75 milliGRAM(s) Oral daily  dextrose 5%. 1000 milliLiter(s) IV Continuous <Continuous>  dextrose 50% Injectable 25 Gram(s) IV Push once  dextrose Oral Gel 15 Gram(s) Oral once PRN  glucagon  Injectable 1 milliGRAM(s) IntraMuscular once  heparin   Injectable 5000 Unit(s) SubCutaneous every 12 hours  insulin lispro (ADMELOG) corrective regimen sliding scale   SubCutaneous three times a day before meals  levothyroxine 25 MICROGram(s) Oral daily  melatonin 5 milliGRAM(s) Oral at bedtime  pantoprazole  Injectable 40 milliGRAM(s) IV Push daily      PHYSICAL EXAM:  T(C): 36.2 (11-12-23 @ 20:00), Max: 36.2 (11-12-23 @ 20:00)  T(F): 97.1 (11-12-23 @ 20:00), Max: 97.1 (11-12-23 @ 20:00)  HR: 104 (11-13-23 @ 06:00) (95 - 111)  BP: --  RR: 22 (11-13-23 @ 06:00) (17 - 25)  SpO2: 98% (11-13-23 @ 06:00) (93% - 98%)  Height (cm): 149.9 (11-10-23 @ 22:00)  Weight (kg): 108 (11-10-23 @ 22:00)  BSA (m2): 1.99 (11-10-23 @ 22:00)    11-10-23 @ 07:01  -  11-11-23 @ 07:00  --------------------------------------------------------  IN: 112.4 mL / OUT: 550 mL / NET: -437.6 mL    11-11-23 @ 07:01  -  11-12-23 @ 07:00  --------------------------------------------------------  IN: 673.2 mL / OUT: 5855 mL / NET: -5181.8 mL    11-12-23 @ 07:01  -  11-13-23 @ 07:00  --------------------------------------------------------  IN: 1456 mL / OUT: 7655 mL / NET: -6199 mL      I&O's Detail    12 Nov 2023 07:01  -  13 Nov 2023 07:00  --------------------------------------------------------  IN:    Bumetanide: 15 mL    DOBUTamine: 81 mL    Oral Fluid: 1360 mL  Total IN: 1456 mL    OUT:    Indwelling Catheter - Urethral (mL): 7655 mL  Total OUT: 7655 mL    Total NET: -6199 mL    ABP: 137/67 (11-13-23 @ 06:00)  ABP(mean): 90 (11-13-23 @ 06:00)    General Appearance: lying in bed, no acute distress	  Cardiovascular: regular rate and rhythm S1 S2, JVP ~15cm  Respiratory: bibasilar crackles  Gastrointestinal:  soft, non-tender  Skin/Integumen: +2 edema  Neurologic: non focal  Musculoskeletal/ extremities: warm, slight erythema bilaterally    LABS:	 	                        10.5   5.95  )-----------( 175      ( 13 Nov 2023 04:35 )             34.4     11-13    141  |  88<L>  |  44<H>  ----------------------------<  140<H>  3.6   |  41<HH>  |  1.5    Ca    8.8      13 Nov 2023 04:35  Phos  2.7     11-13  Mg     1.3     11-13    TPro  7.5  /  Alb  3.6  /  TBili  2.1<H>  /  DBili  x   /  AST  34  /  ALT  28  /  AlkPhos  177<H>  11-13              Culture - Blood (collected 10 Nov 2023 14:28)  Source: .Blood Blood-Peripheral  Preliminary Report (12 Nov 2023 23:01):    No growth at 48 Hours    Culture - Blood (collected 10 Nov 2023 14:28)  Source: .Blood Blood-Peripheral  Preliminary Report (12 Nov 2023 23:01):    No growth at 48 Hours        CARDIAC TESTING:  ECG:  	  Telemetry:  Echocardiogram: LVEF 25-30%, low normal RV function, severely dilated RV, moderate-severe TR,   Coronary angiogram:  RHC:  Stress test:  cMRI:    OTHER DIAGNOSTIC TESTING:  CXR: bilateral pulmonary edema  CT:   Cardiology Attending Note    WILLIAN MARY  MRN-[id number]    Date of Admission: 11-10-23    Brief HPI: 57 y/o female mixed cardiomyopathy LVEF 20% w/ ICD, CAD PCI of RCA in 2021, severe pHTN class II/III, severe PAD, COPD on home O2, CVA (bedbound) presenting with ADH, JEMIMA on CKD, respiratory failure. Precipitating factor possibly 2/2 LLE cellulitis or medication noncompliance.    24 hr events:  NAEON. feeling better    acetaminophen     Tablet .. 650 milliGRAM(s) Oral every 6 hours PRN  albuterol/ipratropium for Nebulization 3 milliLiter(s) Nebulizer every 6 hours  atorvastatin 40 milliGRAM(s) Oral at bedtime  budesonide 160 MICROgram(s)/formoterol 4.5 MICROgram(s) Inhaler 2 Puff(s) Inhalation two times a day  buMETAnide Injectable 1 milliGRAM(s) IV Push every 12 hours  ceFAZolin   IVPB 1000 milliGRAM(s) IV Intermittent every 12 hours  chlorhexidine 2% Cloths 1 Application(s) Topical daily  clopidogrel Tablet 75 milliGRAM(s) Oral daily  dextrose 5%. 1000 milliLiter(s) IV Continuous <Continuous>  dextrose 50% Injectable 25 Gram(s) IV Push once  dextrose Oral Gel 15 Gram(s) Oral once PRN  glucagon  Injectable 1 milliGRAM(s) IntraMuscular once  heparin   Injectable 5000 Unit(s) SubCutaneous every 12 hours  insulin lispro (ADMELOG) corrective regimen sliding scale   SubCutaneous three times a day before meals  levothyroxine 25 MICROGram(s) Oral daily  melatonin 5 milliGRAM(s) Oral at bedtime  pantoprazole  Injectable 40 milliGRAM(s) IV Push daily      PHYSICAL EXAM:  T(C): 36.2 (11-12-23 @ 20:00), Max: 36.2 (11-12-23 @ 20:00)  T(F): 97.1 (11-12-23 @ 20:00), Max: 97.1 (11-12-23 @ 20:00)  HR: 104 (11-13-23 @ 06:00) (95 - 111)  BP: --  RR: 22 (11-13-23 @ 06:00) (17 - 25)  SpO2: 98% (11-13-23 @ 06:00) (93% - 98%)  Height (cm): 149.9 (11-10-23 @ 22:00)  Weight (kg): 108 (11-10-23 @ 22:00)  BSA (m2): 1.99 (11-10-23 @ 22:00)    11-10-23 @ 07:01  -  11-11-23 @ 07:00  --------------------------------------------------------  IN: 112.4 mL / OUT: 550 mL / NET: -437.6 mL    11-11-23 @ 07:01  -  11-12-23 @ 07:00  --------------------------------------------------------  IN: 673.2 mL / OUT: 5855 mL / NET: -5181.8 mL    11-12-23 @ 07:01  -  11-13-23 @ 07:00  --------------------------------------------------------  IN: 1456 mL / OUT: 7655 mL / NET: -6199 mL      I&O's Detail    12 Nov 2023 07:01  -  13 Nov 2023 07:00  --------------------------------------------------------  IN:    Bumetanide: 15 mL    DOBUTamine: 81 mL    Oral Fluid: 1360 mL  Total IN: 1456 mL    OUT:    Indwelling Catheter - Urethral (mL): 7655 mL  Total OUT: 7655 mL    Total NET: -6199 mL    ABP: 137/67 (11-13-23 @ 06:00)  ABP(mean): 90 (11-13-23 @ 06:00)    General Appearance: lying in bed, no acute distress	  Cardiovascular: regular rate and rhythm S1 S2, JVP ~10cm  Respiratory: bibasilar crackles  Gastrointestinal:  soft, non-tender  Skin/Integumen: +1 edema  Neurologic: non focal  Musculoskeletal/ extremities: warm, slight erythema bilaterally    LABS:	 	                        10.5   5.95  )-----------( 175      ( 13 Nov 2023 04:35 )             34.4     11-13    141  |  88<L>  |  44<H>  ----------------------------<  140<H>  3.6   |  41<HH>  |  1.5    Ca    8.8      13 Nov 2023 04:35  Phos  2.7     11-13  Mg     1.3     11-13    TPro  7.5  /  Alb  3.6  /  TBili  2.1<H>  /  DBili  x   /  AST  34  /  ALT  28  /  AlkPhos  177<H>  11-13              Culture - Blood (collected 10 Nov 2023 14:28)  Source: .Blood Blood-Peripheral  Preliminary Report (12 Nov 2023 23:01):    No growth at 48 Hours    Culture - Blood (collected 10 Nov 2023 14:28)  Source: .Blood Blood-Peripheral  Preliminary Report (12 Nov 2023 23:01):    No growth at 48 Hours        CARDIAC TESTING:  ECG:  	  Telemetry:  Echocardiogram: LVEF 25-30%, low normal RV function, severely dilated RV, moderate-severe TR,   Coronary angiogram:  RHC:  Stress test:  cMRI:    OTHER DIAGNOSTIC TESTING:  CXR: bilateral pulmonary edema  CT:

## 2023-11-14 NOTE — SWALLOW FEES ASSESSMENT ADULT - PRELIMINARY ENDOSCOPIC EXAMINATIONS
Interarytenoid/post-commissure edema/Interarytenoid/Arytenoid edema/Interarytenoid/Arytenoid erythema

## 2023-11-14 NOTE — PROGRESS NOTE ADULT - SUBJECTIVE AND OBJECTIVE BOX
CHIEF COMPLAINT:  Patient is a 58y old  Female who presents with a chief complaint of Weakness (14 Nov 2023 08:19)      INTERVAL HISTORY/OVERNIGHT EVENTS:  Patient noted to be in NSVT approx. 4am  Patient otherwise feels better    ======================  MEDICATIONS:  albuterol/ipratropium for Nebulization 3 milliLiter(s) Nebulizer every 6 hours  atorvastatin 40 milliGRAM(s) Oral at bedtime  budesonide 160 MICROgram(s)/formoterol 4.5 MICROgram(s) Inhaler 2 Puff(s) Inhalation two times a day  buMETAnide Injectable 1 milliGRAM(s) IV Push every 12 hours  ceFAZolin   IVPB 1000 milliGRAM(s) IV Intermittent every 12 hours  chlorhexidine 2% Cloths 1 Application(s) Topical daily  clopidogrel Tablet 75 milliGRAM(s) Oral daily  heparin   Injectable 5000 Unit(s) SubCutaneous every 12 hours  insulin lispro (ADMELOG) corrective regimen sliding scale   SubCutaneous three times a day before meals  levothyroxine 25 MICROGram(s) Oral daily  melatonin 5 milliGRAM(s) Oral at bedtime  pantoprazole    Tablet 40 milliGRAM(s) Oral before breakfast  spironolactone 25 milliGRAM(s) Oral daily    DRIPS:  dextrose 5%. 1000 milliLiter(s) (50 mL/Hr) IV Continuous <Continuous>    PRN:       ======================  PHYSICAL EXAMINATION:  GEN:  nad.   HEENT:  eomi. ncat  PULM:  b/l lung sounds, mild crackles at bases  CARD: s1, s2  ABD: +bs. ntnd  EXT:  no new rashes. edema improved to LEs, LLE   NEURO:  no new focal deficits.   ======================  OBJECTIVE:        VS:  T(F): 97 (11-14 @ 08:00), Max: 98.1 (11-13 @ 12:00)  HR: 95 (11-14 @ 08:00) (91 - 102)  BP: 117/63 (11-14 @ 08:00) (111/58 - 136/75)  RR: 19 (11-14 @ 08:00) (16 - 22)  SpO2: 94% (11-14 @ 08:00) (92% - 97%)  CVP(mm Hg): --  CO: --  CI: --  PA: --  PCWP: --    I/O:      11-11 @ 07:01  -  11-12 @ 07:00  --------------------------------------------------------  IN: 673.2 mL / OUT: 5855 mL / NET: -5181.8 mL    11-12 @ 07:01  -  11-13 @ 07:00  --------------------------------------------------------  IN: 1456 mL / OUT: 7655 mL / NET: -6199 mL    11-13 @ 07:01  -  11-14 @ 07:00  --------------------------------------------------------  IN: 1523 mL / OUT: 6330 mL / NET: -4807 mL        Weight trend:  Weight (kg): 108 (11-10)    ======================    LABS:                          10.8   6.13  )-----------( 155      ( 14 Nov 2023 04:41 )             36.2     11-14    141  |  89<L>  |  33<H>  ----------------------------<  130<H>  3.6   |  41<HH>  |  1.3    Ca    8.8      14 Nov 2023 04:41  Phos  2.7     11-13  Mg     2.9     11-13    TPro  7.3  /  Alb  3.5  /  TBili  1.9<H>  /  DBili  x   /  AST  26  /  ALT  17  /  AlkPhos  164<H>  11-14    LIVER FUNCTIONS - ( 14 Nov 2023 04:41 )  Alb: 3.5 g/dL / Pro: 7.3 g/dL / ALK PHOS: 164 U/L / ALT: 17 U/L / AST: 26 U/L / GGT: x                     Urinalysis Basic - ( 14 Nov 2023 04:41 )    Color: x / Appearance: x / SG: x / pH: x  Gluc: 130 mg/dL / Ketone: x  / Bili: x / Urobili: x   Blood: x / Protein: x / Nitrite: x   Leuk Esterase: x / RBC: x / WBC x   Sq Epi: x / Non Sq Epi: x / Bacteria: x        Cultures:    Culture - Blood (collected 11-10)  Source: .Blood Blood-Peripheral  Preliminary Report:    No growth at 72 Hours    Culture - Blood (collected 11-10)  Source: .Blood Blood-Peripheral  Preliminary Report:    No growth at 72 Hours           CHIEF COMPLAINT:  Patient is a 58y old  Female who presents with a chief complaint of Weakness (14 Nov 2023 08:19)      INTERVAL HISTORY/OVERNIGHT EVENTS:  Patient noted to be in NSVT approx. 4am  Patient otherwise feels better    ======================  MEDICATIONS:  albuterol/ipratropium for Nebulization 3 milliLiter(s) Nebulizer every 6 hours  atorvastatin 40 milliGRAM(s) Oral at bedtime  budesonide 160 MICROgram(s)/formoterol 4.5 MICROgram(s) Inhaler 2 Puff(s) Inhalation two times a day  buMETAnide Injectable 1 milliGRAM(s) IV Push every 12 hours  ceFAZolin   IVPB 1000 milliGRAM(s) IV Intermittent every 12 hours  chlorhexidine 2% Cloths 1 Application(s) Topical daily  clopidogrel Tablet 75 milliGRAM(s) Oral daily  heparin   Injectable 5000 Unit(s) SubCutaneous every 12 hours  insulin lispro (ADMELOG) corrective regimen sliding scale   SubCutaneous three times a day before meals  levothyroxine 25 MICROGram(s) Oral daily  melatonin 5 milliGRAM(s) Oral at bedtime  pantoprazole    Tablet 40 milliGRAM(s) Oral before breakfast  spironolactone 25 milliGRAM(s) Oral daily    DRIPS:  dextrose 5%. 1000 milliLiter(s) (50 mL/Hr) IV Continuous <Continuous>    PRN:       ======================  PHYSICAL EXAMINATION:  GEN:  nad.   HEENT:  eomi. ncat  PULM:  b/l lung sounds, mild crackles at bases  CARD: s1, s2  ABD: +bs. ntnd  EXT:  no new rashes. edema improved to LEs, LLE   NEURO:  no new focal deficits.   ======================  OBJECTIVE:        VS:  T(F): 97 (11-14 @ 08:00), Max: 98.1 (11-13 @ 12:00)  HR: 95 (11-14 @ 08:00) (91 - 102)  BP: 117/63 (11-14 @ 08:00) (111/58 - 136/75)  RR: 19 (11-14 @ 08:00) (16 - 22)  SpO2: 94% (11-14 @ 08:00) (92% - 97%)    11-11 @ 07:01  -  11-12 @ 07:00  --------------------------------------------------------  IN: 673.2 mL / OUT: 5855 mL / NET: -5181.8 mL    11-12 @ 07:01  -  11-13 @ 07:00  --------------------------------------------------------  IN: 1456 mL / OUT: 7655 mL / NET: -6199 mL    11-13 @ 07:01  -  11-14 @ 07:00  --------------------------------------------------------  IN: 1523 mL / OUT: 6330 mL / NET: -4807 mL        Weight trend:  Weight (kg): 108 (11-10)    ======================    LABS:                          10.8   6.13  )-----------( 155      ( 14 Nov 2023 04:41 )             36.2     11-14    141  |  89<L>  |  33<H>  ----------------------------<  130<H>  3.6   |  41<HH>  |  1.3    Ca    8.8      14 Nov 2023 04:41  Phos  2.7     11-13  Mg     2.9     11-13    TPro  7.3  /  Alb  3.5  /  TBili  1.9<H>  /  DBili  x   /  AST  26  /  ALT  17  /  AlkPhos  164<H>  11-14    LIVER FUNCTIONS - ( 14 Nov 2023 04:41 )  Alb: 3.5 g/dL / Pro: 7.3 g/dL / ALK PHOS: 164 U/L / ALT: 17 U/L / AST: 26 U/L / GGT: x                     Urinalysis Basic - ( 14 Nov 2023 04:41 )    Color: x / Appearance: x / SG: x / pH: x  Gluc: 130 mg/dL / Ketone: x  / Bili: x / Urobili: x   Blood: x / Protein: x / Nitrite: x   Leuk Esterase: x / RBC: x / WBC x   Sq Epi: x / Non Sq Epi: x / Bacteria: x        Cultures:    Culture - Blood (collected 11-10)  Source: .Blood Blood-Peripheral  Preliminary Report:    No growth at 72 Hours    Culture - Blood (collected 11-10)  Source: .Blood Blood-Peripheral  Preliminary Report:    No growth at 72 Hours

## 2023-11-14 NOTE — PROGRESS NOTE ADULT - ASSESSMENT
59 y/o female mixed cardiomyopathy LVEF 20% w/ ICD, CAD PCI of RCA in 2021, severe pHTN class II/III, severe PAD, COPD on home O2, CVA (bedbound) presenting with ADH, JEMIMA on CKD, respiratory failure. Precipitating factor possibly 2/2 LLE cellulitis and/or medication noncompliance. She is no longer requiring inotropes and her renal function has improved back to baseline. On exam she is warm, well perfused, and hypervolemic. She has been receiving IV bumex with a good response and we will continue for a net negative goal of 2L.     In regards to GDMT, she was on Entresto and Farxiga in the past. We will start with spironolactone 25 mg daily and likely restart Entrest/SGLT2i in the next few days. We will hold off on a BB for now as she is only recently off of inotropes. In regards to her chronic severe PAD, there does not appear to be a significant change from her prior studies and her exam is not concerning for acute limb ischemia. We will continue aggressive secondary modification.    #NICM/ICM  #HFrEF  #ADHF  #Severe pHTN class II/III  #Severe PAD  #LLE cellulitis  #Acute hypoxic/hypercarbic respiratory failure  #Severe COPD  #AKD on CKD    Plan  -IV bumex 2 mg for net negative goal 2L->reduce diuresis  -Start spironolactone 25 mg daily  -continue plavix. increase atorvastatin to 80 mg   -Keep K>4, Mag>2  -Continue abx for 5 day course    Mic Khan MD  Interventional Cardiology/Advanced Heart Failure Transplant   57 y/o female mixed cardiomyopathy LVEF 20% w/ ICD, CAD PCI of RCA in 2021, severe pHTN class II/III, severe PAD, COPD on home O2, CVA (bedbound) presenting with ADH, JEMIMA on CKD, respiratory failure. Precipitating factor possibly 2/2 LLE cellulitis and/or medication noncompliance. On exam she is warm, well perfused, and mildly hypervolemic. She has been receiving IV bumex with a good response and continues to be extremely negative. She is starting to have contraction alkalosis and we do not need to be as aggressive regarding her diuresis.    In regards to GDMT, she was on Entresto and Farxiga in the past. She had a few runs of NSVT overnight and so we will prioritize starting a BB today. In regards to her chronic severe PAD, there does not appear to be a significant change from her prior studies and her exam is not concerning for acute limb ischemia. We will continue aggressive secondary modification.    #NICM/ICM  #HFrEF  #ADHF  #Severe pHTN class II/III  #Severe PAD  #LLE cellulitis  #Acute hypoxic/hypercarbic respiratory failure  #Severe COPD  #AKD on CKD    Plan  -Diamox 250mg IV once for net negative 1-2L  -Continue spironolactone 25 mg daily  -Start metop XL 25 mg daily  -continue plavix. increase atorvastatin to 80 mg   -Keep K>4, Mag>2  -Continue abx for 5 day course    Mic Khan MD  Interventional Cardiology/Advanced Heart Failure Transplant   59 y/o female mixed cardiomyopathy LVEF 20% w/ ICD, CAD PCI of RCA in 2021, severe pHTN class II/III, severe PAD, COPD on home O2, CVA (bedbound) presenting with ADH, JEMIMA on CKD, respiratory failure. Precipitating factor possibly 2/2 LLE cellulitis and/or medication noncompliance. On exam she is warm, well perfused, and mildly hypervolemic. She has been receiving IV bumex with a good response and continues to be extremely negative. She is starting to have contraction alkalosis and we do not need to be as aggressive regarding her diuresis.    In regards to GDMT, she was on Entresto and Farxiga in the past. She had a few runs of NSVT overnight and so we will prioritize starting a BB today. In regards to her chronic severe PAD, there does not appear to be a significant change from her prior studies and her exam is not concerning for acute limb ischemia. We will continue aggressive secondary modification.    #NICM/ICM  #HFrEF  #ADHF  #Severe pHTN class II/III  #Severe PAD  #LLE cellulitis  #Acute hypoxic/hypercarbic respiratory failure  #Severe COPD  #AKD on CKD    Plan  -Diamox 250mg IV once for net negative 1-2L  -Continue spironolactone 25 mg daily  -Start metop XL 25 mg daily  -continue plavix. increase atorvastatin to 80 mg   -Keep K>4, Mag>2  -Continue abx for 5 day course  -Transfer to T    Mic Khan MD  Interventional Cardiology/Advanced Heart Failure Transplant

## 2023-11-14 NOTE — CHART NOTE - NSCHARTNOTEFT_GEN_A_CORE
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  Patient has a home health aide, who called 911 today. Patient denies any fever or chills, cough, chest pain, dyspnea, abdominal pain, vomiting, diarrhea, new rash, any pain anywhere. Patient states that she has been having no appetite or energy over the past 5 days.    In the ED vitals stable  VBGs showed pCO2 of 55  BNP 30k  Trop 0.06  Cr 3.0  LA 4.6    Admitted to CCU for CHF exacerbation plus cardiorenal   (11-10-23 @ 18:57)  CCU Course:  Patient required Bipap. Patient required diuresis, started on Bumex infusion 1mg/hr. Suspected LLE cellulitis vs DVT vs medication non-compliance as cause of exacerbation. Started on cefepime and vancomycin, switch to Cefazolin as per ID. DVT study showed superficial occlusions but with reconstitution of flow distally, negative for DVT. Patient weaned off BiPaP, now on her baseline O2 requirements, 4L nc. Kidney function normalizing, urine output at 2-2.5L/shift, switched diuretic to diamox for contraction alkalosis. Patient toted to have runs of NSVT during multiple nights. Restarted GDMT with Spironolactone and now Metoprolol Succinate.      ASSESSMENT & PLAN:   CHF Exacerbation  -Required BiPAP, weaned off to baseline O2 requirements, 4L nc  -Required diuresis, bumex drip to bumex pushes, now on diamox  -Restarted GDMT, on Spironolactone and now Metoprolol Succinate    Cardiorenal  -Kidney function improving    Cellulitis on DVT  -Negative DVT study  -On 5 day course of Cefazolin    COPD on home O2  -Duonebs prn, Budesonide prn  -Patient at baseline O2 requirements    HFrEF  -GDMT with     HTN    CKD    CAD    DM    CVA    Hypothyroidism          FOR FOLLOW UP:  [ ]   [ ]   [ ] Transfer from: [facility]    Transfer to: (Medicine, Telemetry, ICU, etc)    Sign out given to:     HPI / HOSPITAL COURSE:  Patient is a 58-year-old woman with a history of COPD on 4 L nasal cannula, CORNELIUS on CPAP, Hypothyrodism, HFrEF (19% on TTE 02/2022, s/p ICD, pulmonary HTN, CAD s/p PCI to RCA), CKD (baseline Cr around 1.8), hypertension, dyslipidemia, 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  Patient has a home health aide, who called 911 today. Patient denies any fever or chills, cough, chest pain, dyspnea, abdominal pain, vomiting, diarrhea, new rash, any pain anywhere. Patient states that she has been having no appetite or energy over the past 5 days.    In the ED vitals stable  VBGs showed pCO2 of 55  BNP 30k  Trop 0.06  Cr 3.0  LA 4.6    Admitted to CCU for CHF exacerbation plus cardiorenal   (11-10-23 @ 18:57)  CCU Course:  Patient required Bipap. Patient required diuresis, started on Bumex infusion 1mg/hr. Suspected LLE cellulitis vs DVT vs medication non-compliance as cause of exacerbation. Started on cefepime and vancomycin, switch to Cefazolin as per ID. DVT study showed superficial occlusions but with reconstitution of flow distally, negative for DVT. Patient weaned off BiPaP, now on her baseline O2 requirements, 4L nc. Kidney function normalizing, urine output at 2-2.5L/shift, switched diuretic to diamox for contraction alkalosis. Patient toted to have runs of NSVT during multiple nights. Restarted GDMT with Spironolactone and now Metoprolol Succinate.      ASSESSMENT & PLAN:   CHF Exacerbation  -Required BiPAP, weaned off to baseline O2 requirements, 4L nc  -Required diuresis, bumex drip to bumex pushes, now on diamox to account for contraction alkalosis, with CO2 in 40s  -Restarted GDMT, on Spironolactone and now Metoprolol Succinate    Cardiorenal  -Creatinine bumped to 3, BUN to 60s, eGFR 18  -Kidney function improving, Cr. 1.3, BUN 33 this am    Cellulitis on DVT  -Negative DVT study  -On 5 day course of Cefazolin for suspected LLE cellulitis    COPD on home O2  -Duonebs prn, Budesonide prn  -Patient at baseline O2 requirements    HFrEF  -GDMT with Spironolactone, Metoprolol Succinate    CKD  -Stage 2 CKD, trending now to baseline kidney function    CAD  -Continuing Clopidogrel, Statin    DM  -Insulin sliding scale      FOR FOLLOW UP:  F/u PT   F/u response to GDMT  F/u kidney function Transfer from: [facility]    Transfer to: (Medicine, Telemetry, ICU, etc)    Sign out given to:     HPI / HOSPITAL COURSE:  Patient is a 58-year-old woman with a history of COPD on 4 L nasal cannula, CORNELIUS on CPAP, Hypothyrodism, HFrEF (19% on TTE 02/2022, s/p ICD, pulmonary HTN, CAD s/p PCI to RCA), CKD (baseline Cr around 1.8), hypertension, dyslipidemia, 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  Patient has a home health aide, who called 911 today. Patient denies any fever or chills, cough, chest pain, dyspnea, abdominal pain, vomiting, diarrhea, new rash, any pain anywhere. Patient states that she has been having no appetite or energy over the past 5 days.    In the ED vitals stable  VBGs showed pCO2 of 55  BNP 30k  Trop 0.06  Cr 3.0  LA 4.6    Admitted to CCU for CHF exacerbation plus cardiorenal   (11-10-23 @ 18:57)  CCU Course:  Patient required Bipap. Patient required diuresis, started on Bumex infusion 1mg/hr. Suspected LLE cellulitis vs DVT vs medication non-compliance as cause of exacerbation. Started on cefepime and vancomycin, switch to Cefazolin as per ID. DVT study showed superficial occlusions but with reconstitution of flow distally, negative for DVT. Patient weaned off BiPaP, now on her baseline O2 requirements, 4L nc. Kidney function normalizing, urine output at 2-2.5L/shift, switched diuretic to diamox for contraction alkalosis. Patient toted to have runs of NSVT during multiple nights. Restarted GDMT with Spironolactone and now Metoprolol Succinate.      ASSESSMENT & PLAN:   CHF Exacerbation  -Required BiPAP, weaned off to baseline O2 requirements, 4L nc  -Required diuresis, bumex drip to bumex pushes, now on diamox to account for contraction alkalosis, with CO2 in 40s  -Restarted GDMT, on Spironolactone and now Metoprolol Succinate    Cardiorenal  -Creatinine bumped to 3, BUN to 60s, eGFR 18  -Kidney function improving, Cr. 1.3, BUN 33 this am    NSVT  -multiple NSVT runs  -Started on Metoprolol Succinate   -Possible AICD candidate, but per patient does not want that procedure    Cellulitis vs DVT  -Negative DVT study  -On 5 day course of Cefazolin for suspected LLE cellulitis    COPD on home O2  -Duonebs prn, Budesonide prn  -Patient at baseline O2 requirements    HFrEF  -GDMT with Spironolactone, Metoprolol Succinate    CKD  -Stage 2 CKD, trending now to baseline kidney function    CAD  -Continuing Clopidogrel, Statin    DM  -Insulin sliding scale      FOR FOLLOW UP:  F/u PT   F/u response to GDMT  F/u kidney function Transfer from: [facility]    Transfer to: (Medicine, Telemetry, ICU, etc)    Sign out given to:     HPI / HOSPITAL COURSE:  Patient is a 58-year-old woman with a history of COPD on 4 L nasal cannula, CORNELIUS on CPAP, Hypothyrodism, HFrEF (19% on TTE 02/2022, s/p ICD, pulmonary HTN, CAD s/p PCI to RCA), CKD (baseline Cr around 1.8), hypertension, dyslipidemia, 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  Patient has a home health aide, who called 911 today. Patient denies any fever or chills, cough, chest pain, dyspnea, abdominal pain, vomiting, diarrhea, new rash, any pain anywhere. Patient states that she has been having no appetite or energy over the past 5 days.    In the ED vitals stable  VBGs showed pCO2 of 55  BNP 30k  Trop 0.06  Cr 3.0  LA 4.6    Admitted to CCU for CHF exacerbation plus cardiorenal   (11-10-23 @ 18:57)  CCU Course:  Patient required Bipap. Patient required diuresis, started on Bumex infusion 1mg/hr. Patient also required Dobutamine drip. Suspected LLE cellulitis vs DVT vs medication non-compliance as cause of exacerbation. Started on cefepime and vancomycin, switch to Cefazolin as per ID. DVT study showed superficial occlusions but with reconstitution of flow distally, negative for DVT. Patient weaned off BiPaP, now on her baseline O2 requirements, 4L nc. Discontinued Dobutamine drip on 11/12. Kidney function normalizing, urine output at 2-2.5L/shift. With elevated CO2 in the 40s, were concerned about contraction alkalosis, so switched diuresis regimen from Bumex to Diamox today. Patient noted to have runs of NSVT during multiple nights. Restarted GDMT with Spironolactone. To address runs of NSVT and further GDMT, Patient now started on Metoprolol Succinate.      ASSESSMENT & PLAN:   CHF Exacerbation  -Required BiPAP, weaned off to baseline O2 requirements, 4L nc  -Required diuresis, Bumex drip to Bumex pushes.   -Concerned about contraction alkalosis with CO2 in 40s, switched regimen from Bumex to Diamox.  -Restarted GDMT, on Spironolactone. Addition of Metoprolol Succinate today.    Cardiorenal  -Creatinine bumped to 3, BUN to 60s, eGFR 18  -Kidney function improving, Cr. 1.3, BUN 33 this am    NSVT  -multiple NSVT runs  -Started on Metoprolol Succinate today  -Possible AICD candidate, but per patient does not want that procedure    Cellulitis vs DVT  -Negative DVT study  -On 5 day course of Cefazolin for suspected LLE cellulitis    COPD on home O2  -Duo-nebs prn, Budesonide prn  -Patient at baseline O2 requirements    HFrEF  -GDMT with Spironolactone, Metoprolol Succinate    CKD  -Stage 2 CKD, trending now to baseline kidney function    CAD  -Continuing Clopidogrel, Statin    DM  -Insulin sliding scale      FOR FOLLOW UP:  F/u PT   F/u response to GDMT  F/u kidney function Transfer from: [facility]    Transfer to: (Medicine, Telemetry, ICU, etc)    Sign out given to:     HPI / HOSPITAL COURSE:  Patient is a 58-year-old woman with a history of COPD on 4 L nasal cannula, CORNELIUS on CPAP, Hypothyrodism, HFrEF (19% on TTE 02/2022, s/p ICD, pulmonary HTN, CAD s/p PCI to RCA), CKD (baseline Cr around 1.8), hypertension, dyslipidemia, 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  Patient has a home health aide, who called 911 today. Patient denies any fever or chills, cough, chest pain, dyspnea, abdominal pain, vomiting, diarrhea, new rash, any pain anywhere. Patient states that she has been having no appetite or energy over the past 5 days.    In the ED vitals stable  VBGs showed pCO2 of 55  BNP 30k  Trop 0.06  Cr 3.0  LA 4.6    Admitted to CCU for CHF exacerbation plus cardiorenal   (11-10-23 @ 18:57)  CCU Course:  Patient required Bipap. Patient required diuresis, started on Bumex infusion 1mg/hr. Patient also required Dobutamine drip. Suspected LLE cellulitis vs DVT vs medication non-compliance as cause of exacerbation. Started on cefepime and vancomycin, switch to Cefazolin as per ID. DVT study showed superficial occlusions but with reconstitution of flow distally, negative for DVT. Patient weaned off BiPaP, now on her baseline O2 requirements, 4L nc. Discontinued Dobutamine drip on 11/12. Kidney function normalizing, urine output at 2-2.5L/shift. With elevated CO2 in the 40s, were concerned about contraction alkalosis, so switched diuresis regimen from Bumex to Diamox today. Patient noted to have runs of NSVT during multiple nights. Restarted GDMT with Spironolactone. To address runs of NSVT and further GDMT, Patient now started on Metoprolol Succinate.      ASSESSMENT & PLAN:   CHF Exacerbation  -Required BiPAP, weaned off to baseline O2 requirements, 4L nc  -Required diuresis, Bumex drip to Bumex pushes.   -Concerned about contraction alkalosis with CO2 in 40s, switched regimen from Bumex to Diamox.  -Restarted GDMT, on Spironolactone. Addition of Metoprolol Succinate today.  -Plan to restart Entresto when kidney function improves    Cardiorenal  -Creatinine bumped to 3, BUN to 60s, eGFR 18  -Kidney function improving, Cr. 1.3, BUN 33 this am    NSVT  -multiple NSVT runs  -Started on Metoprolol Succinate today  -Possible AICD candidate, but per patient does not want that procedure    Cellulitis vs DVT  -SFA occlusions b/l  -On 5 day course of Cefazolin for suspected LLE cellulitis    PAD  -SFA occlusions b/l  -Vascular cardio follow-up with Dr. Prieto outpatient    COPD on home O2  -Duo-nebs prn, Budesonide prn  -Patient at baseline O2 requirements    HFrEF  -GDMT with Spironolactone, Metoprolol Succinate    CKD  -Stage 2 CKD, trending now to baseline kidney function    CAD  -Continuing Clopidogrel, Statin    DM  -Insulin sliding scale      FOR FOLLOW UP:  F/u PT   F/u response to GDMT  F/u kidney function

## 2023-11-14 NOTE — PROGRESS NOTE ADULT - ATTENDING COMMENTS
I, Mic Khan MD, have personally seen and examined the patient.  I fully participated in the care of this patient.  I have made amendments to the documentation where necessary, and agree with the history, physical exam, and plan as documented by the Resident/Fellow/OTONIEL.    Please separate attending note for recommendations/billing
I, Mic Khan MD, have personally seen and examined the patient.  I fully participated in the care of this patient.  I have made amendments to the documentation where necessary, and agree with the history, physical exam, and plan as documented by the Resident/Fellow/OTONIEL.    Please separate attending note for recommendations/billing
In brief, 58-year-old woman, bedbound (? 2/2 CVA) with history of severe pHTN (WHO II/III), COPD on home O2 4L NC, mixed CM with EF 19% s/p remote PCI of the RCA and ICD, CVA with residual LE weakness, severe PAD, and DM2 presenting with acute on chronic systolic heart failure with acute on chronic hypercapnic/hypoxemic respiratory failure and JEMIMA on CKD.    Labs on presentation  - Hgb 10.4, Plt 184  - Cr 3.0 (baseline 1.8), K 5.5, alkp 205, , ALT 99  - Venous lactate 3.2  - Trop 0.06, pBNP 33K  - ABG pH 7.37, pCO2 52, HCO3 30, SaO2 98.5 on FiO2 40%    Was started on BiPAP, , and IV bumex.  This morning has improvement in hemodynamics with 3L UOP over 24hrs.    TTE  - EF 25-30%, dyskinetic septum, G2DD, severe RV dilatation with low-normal function, mod RA, mild LA, mod-severe TR with hepatic flow reversal, IVC 2.8cm with <50% collapsibility, PASP at least 60mmHg, small pericardial effusion without tamponade physiology.  - Systolic function and measured PASP are grossly unchanged from 12/2022.    [facility] 2021 for ischemic eval and (+) NM stress  - 1vCAD, s/p successful PCI of a 90% occluded pRCA    Arterial Doppler 1/2023  - No flow is visualized in distal right SFA and proximal and mid left SFA.  - Reconstitution in the popliteal artery bilaterally with diminished flow   in tibial arteries.    Arterial Doppler 11/11/23  - Bilateral SFA occlusions with distal reconstitution    Exam notable for exquisite LE tenderness, L>R with pitting edema and warmth L>R.    Plan:  - Unclear precipitating factor for patient's acute on chronic systolic heart failure. Consideration for infection (LLE cellulitis), progression of pHTN, medication non-adherence, dietary indiscretion.  - Empiric antibiotics for possible LLE cellulitis  - Vascular cardiology follow up tomorrow  - Bumex IV, decrease to 1mg BID, monitor UOP and IVC.  - Discontinue

## 2023-11-15 LAB
ALBUMIN SERPL ELPH-MCNC: 3.4 G/DL — LOW (ref 3.5–5.2)
ALBUMIN SERPL ELPH-MCNC: 3.4 G/DL — LOW (ref 3.5–5.2)
ALP SERPL-CCNC: 161 U/L — HIGH (ref 30–115)
ALP SERPL-CCNC: 161 U/L — HIGH (ref 30–115)
ALT FLD-CCNC: 10 U/L — SIGNIFICANT CHANGE UP (ref 0–41)
ALT FLD-CCNC: 10 U/L — SIGNIFICANT CHANGE UP (ref 0–41)
ANION GAP SERPL CALC-SCNC: 10 MMOL/L — SIGNIFICANT CHANGE UP (ref 7–14)
ANION GAP SERPL CALC-SCNC: 10 MMOL/L — SIGNIFICANT CHANGE UP (ref 7–14)
AST SERPL-CCNC: 25 U/L — SIGNIFICANT CHANGE UP (ref 0–41)
AST SERPL-CCNC: 25 U/L — SIGNIFICANT CHANGE UP (ref 0–41)
BILIRUB SERPL-MCNC: 1.7 MG/DL — HIGH (ref 0.2–1.2)
BILIRUB SERPL-MCNC: 1.7 MG/DL — HIGH (ref 0.2–1.2)
BUN SERPL-MCNC: 27 MG/DL — HIGH (ref 10–20)
BUN SERPL-MCNC: 27 MG/DL — HIGH (ref 10–20)
CALCIUM SERPL-MCNC: 9 MG/DL — SIGNIFICANT CHANGE UP (ref 8.4–10.5)
CALCIUM SERPL-MCNC: 9 MG/DL — SIGNIFICANT CHANGE UP (ref 8.4–10.5)
CHLORIDE SERPL-SCNC: 88 MMOL/L — LOW (ref 98–110)
CHLORIDE SERPL-SCNC: 88 MMOL/L — LOW (ref 98–110)
CO2 SERPL-SCNC: 39 MMOL/L — HIGH (ref 17–32)
CO2 SERPL-SCNC: 39 MMOL/L — HIGH (ref 17–32)
CREAT SERPL-MCNC: 1.3 MG/DL — SIGNIFICANT CHANGE UP (ref 0.7–1.5)
CREAT SERPL-MCNC: 1.3 MG/DL — SIGNIFICANT CHANGE UP (ref 0.7–1.5)
CULTURE RESULTS: SIGNIFICANT CHANGE UP
EGFR: 48 ML/MIN/1.73M2 — LOW
EGFR: 48 ML/MIN/1.73M2 — LOW
GLUCOSE BLDC GLUCOMTR-MCNC: 146 MG/DL — HIGH (ref 70–99)
GLUCOSE BLDC GLUCOMTR-MCNC: 146 MG/DL — HIGH (ref 70–99)
GLUCOSE BLDC GLUCOMTR-MCNC: 153 MG/DL — HIGH (ref 70–99)
GLUCOSE BLDC GLUCOMTR-MCNC: 153 MG/DL — HIGH (ref 70–99)
GLUCOSE BLDC GLUCOMTR-MCNC: 177 MG/DL — HIGH (ref 70–99)
GLUCOSE BLDC GLUCOMTR-MCNC: 177 MG/DL — HIGH (ref 70–99)
GLUCOSE BLDC GLUCOMTR-MCNC: 215 MG/DL — HIGH (ref 70–99)
GLUCOSE BLDC GLUCOMTR-MCNC: 215 MG/DL — HIGH (ref 70–99)
GLUCOSE SERPL-MCNC: 195 MG/DL — HIGH (ref 70–99)
GLUCOSE SERPL-MCNC: 195 MG/DL — HIGH (ref 70–99)
HCT VFR BLD CALC: 38.1 % — SIGNIFICANT CHANGE UP (ref 37–47)
HCT VFR BLD CALC: 38.1 % — SIGNIFICANT CHANGE UP (ref 37–47)
HGB BLD-MCNC: 11.3 G/DL — LOW (ref 12–16)
HGB BLD-MCNC: 11.3 G/DL — LOW (ref 12–16)
MAGNESIUM SERPL-MCNC: 1.4 MG/DL — LOW (ref 1.8–2.4)
MAGNESIUM SERPL-MCNC: 1.4 MG/DL — LOW (ref 1.8–2.4)
MCHC RBC-ENTMCNC: 26.3 PG — LOW (ref 27–31)
MCHC RBC-ENTMCNC: 26.3 PG — LOW (ref 27–31)
MCHC RBC-ENTMCNC: 29.7 G/DL — LOW (ref 32–37)
MCHC RBC-ENTMCNC: 29.7 G/DL — LOW (ref 32–37)
MCV RBC AUTO: 88.8 FL — SIGNIFICANT CHANGE UP (ref 81–99)
MCV RBC AUTO: 88.8 FL — SIGNIFICANT CHANGE UP (ref 81–99)
NRBC # BLD: 0 /100 WBCS — SIGNIFICANT CHANGE UP (ref 0–0)
NRBC # BLD: 0 /100 WBCS — SIGNIFICANT CHANGE UP (ref 0–0)
PLATELET # BLD AUTO: 166 K/UL — SIGNIFICANT CHANGE UP (ref 130–400)
PLATELET # BLD AUTO: 166 K/UL — SIGNIFICANT CHANGE UP (ref 130–400)
PMV BLD: 11.6 FL — HIGH (ref 7.4–10.4)
PMV BLD: 11.6 FL — HIGH (ref 7.4–10.4)
POTASSIUM SERPL-MCNC: 3.7 MMOL/L — SIGNIFICANT CHANGE UP (ref 3.5–5)
POTASSIUM SERPL-MCNC: 3.7 MMOL/L — SIGNIFICANT CHANGE UP (ref 3.5–5)
POTASSIUM SERPL-SCNC: 3.7 MMOL/L — SIGNIFICANT CHANGE UP (ref 3.5–5)
POTASSIUM SERPL-SCNC: 3.7 MMOL/L — SIGNIFICANT CHANGE UP (ref 3.5–5)
PROT SERPL-MCNC: 7.7 G/DL — SIGNIFICANT CHANGE UP (ref 6–8)
PROT SERPL-MCNC: 7.7 G/DL — SIGNIFICANT CHANGE UP (ref 6–8)
RBC # BLD: 4.29 M/UL — SIGNIFICANT CHANGE UP (ref 4.2–5.4)
RBC # BLD: 4.29 M/UL — SIGNIFICANT CHANGE UP (ref 4.2–5.4)
RBC # FLD: 22 % — HIGH (ref 11.5–14.5)
RBC # FLD: 22 % — HIGH (ref 11.5–14.5)
SODIUM SERPL-SCNC: 137 MMOL/L — SIGNIFICANT CHANGE UP (ref 135–146)
SODIUM SERPL-SCNC: 137 MMOL/L — SIGNIFICANT CHANGE UP (ref 135–146)
SPECIMEN SOURCE: SIGNIFICANT CHANGE UP
WBC # BLD: 5.63 K/UL — SIGNIFICANT CHANGE UP (ref 4.8–10.8)
WBC # BLD: 5.63 K/UL — SIGNIFICANT CHANGE UP (ref 4.8–10.8)
WBC # FLD AUTO: 5.63 K/UL — SIGNIFICANT CHANGE UP (ref 4.8–10.8)
WBC # FLD AUTO: 5.63 K/UL — SIGNIFICANT CHANGE UP (ref 4.8–10.8)

## 2023-11-15 PROCEDURE — 99233 SBSQ HOSP IP/OBS HIGH 50: CPT

## 2023-11-15 RX ORDER — SACUBITRIL AND VALSARTAN 24; 26 MG/1; MG/1
1 TABLET, FILM COATED ORAL
Refills: 0 | Status: DISCONTINUED | OUTPATIENT
Start: 2023-11-15 | End: 2023-11-17

## 2023-11-15 RX ORDER — METOPROLOL TARTRATE 50 MG
25 TABLET ORAL ONCE
Refills: 0 | Status: COMPLETED | OUTPATIENT
Start: 2023-11-15 | End: 2023-11-15

## 2023-11-15 RX ORDER — POTASSIUM CHLORIDE 20 MEQ
40 PACKET (EA) ORAL ONCE
Refills: 0 | Status: COMPLETED | OUTPATIENT
Start: 2023-11-15 | End: 2023-11-15

## 2023-11-15 RX ORDER — METOPROLOL TARTRATE 50 MG
50 TABLET ORAL DAILY
Refills: 0 | Status: DISCONTINUED | OUTPATIENT
Start: 2023-11-16 | End: 2023-11-19

## 2023-11-15 RX ORDER — MAGNESIUM SULFATE 500 MG/ML
2 VIAL (ML) INJECTION ONCE
Refills: 0 | Status: COMPLETED | OUTPATIENT
Start: 2023-11-15 | End: 2023-11-15

## 2023-11-15 RX ORDER — POTASSIUM CHLORIDE 20 MEQ
20 PACKET (EA) ORAL ONCE
Refills: 0 | Status: COMPLETED | OUTPATIENT
Start: 2023-11-15 | End: 2023-11-15

## 2023-11-15 RX ORDER — DAPAGLIFLOZIN 10 MG/1
10 TABLET, FILM COATED ORAL DAILY
Refills: 0 | Status: DISCONTINUED | OUTPATIENT
Start: 2023-11-15 | End: 2023-11-19

## 2023-11-15 RX ORDER — BUMETANIDE 0.25 MG/ML
1 INJECTION INTRAMUSCULAR; INTRAVENOUS ONCE
Refills: 0 | Status: COMPLETED | OUTPATIENT
Start: 2023-11-15 | End: 2023-11-15

## 2023-11-15 RX ADMIN — Medication 5 MILLIGRAM(S): at 21:53

## 2023-11-15 RX ADMIN — Medication 40 MILLIEQUIVALENT(S): at 13:27

## 2023-11-15 RX ADMIN — SACUBITRIL AND VALSARTAN 1 TABLET(S): 24; 26 TABLET, FILM COATED ORAL at 17:34

## 2023-11-15 RX ADMIN — Medication 1: at 17:32

## 2023-11-15 RX ADMIN — DAPAGLIFLOZIN 10 MILLIGRAM(S): 10 TABLET, FILM COATED ORAL at 12:26

## 2023-11-15 RX ADMIN — Medication 25 MICROGRAM(S): at 05:28

## 2023-11-15 RX ADMIN — ATORVASTATIN CALCIUM 40 MILLIGRAM(S): 80 TABLET, FILM COATED ORAL at 21:54

## 2023-11-15 RX ADMIN — Medication 100 MILLIGRAM(S): at 05:26

## 2023-11-15 RX ADMIN — Medication 25 GRAM(S): at 15:53

## 2023-11-15 RX ADMIN — HEPARIN SODIUM 5000 UNIT(S): 5000 INJECTION INTRAVENOUS; SUBCUTANEOUS at 05:27

## 2023-11-15 RX ADMIN — PANTOPRAZOLE SODIUM 40 MILLIGRAM(S): 20 TABLET, DELAYED RELEASE ORAL at 05:28

## 2023-11-15 RX ADMIN — BUDESONIDE AND FORMOTEROL FUMARATE DIHYDRATE 2 PUFF(S): 160; 4.5 AEROSOL RESPIRATORY (INHALATION) at 23:04

## 2023-11-15 RX ADMIN — CHLORHEXIDINE GLUCONATE 1 APPLICATION(S): 213 SOLUTION TOPICAL at 12:41

## 2023-11-15 RX ADMIN — Medication 25 GRAM(S): at 13:27

## 2023-11-15 RX ADMIN — Medication 2: at 12:40

## 2023-11-15 RX ADMIN — SPIRONOLACTONE 25 MILLIGRAM(S): 25 TABLET, FILM COATED ORAL at 05:28

## 2023-11-15 RX ADMIN — Medication 25 MILLIGRAM(S): at 09:39

## 2023-11-15 RX ADMIN — Medication 100 MILLIGRAM(S): at 17:34

## 2023-11-15 RX ADMIN — BUDESONIDE AND FORMOTEROL FUMARATE DIHYDRATE 2 PUFF(S): 160; 4.5 AEROSOL RESPIRATORY (INHALATION) at 08:31

## 2023-11-15 RX ADMIN — HEPARIN SODIUM 5000 UNIT(S): 5000 INJECTION INTRAVENOUS; SUBCUTANEOUS at 17:34

## 2023-11-15 RX ADMIN — Medication 20 MILLIEQUIVALENT(S): at 15:52

## 2023-11-15 RX ADMIN — BUMETANIDE 1 MILLIGRAM(S): 0.25 INJECTION INTRAMUSCULAR; INTRAVENOUS at 13:27

## 2023-11-15 RX ADMIN — Medication 25 MILLIGRAM(S): at 05:28

## 2023-11-15 RX ADMIN — CLOPIDOGREL BISULFATE 75 MILLIGRAM(S): 75 TABLET, FILM COATED ORAL at 12:27

## 2023-11-15 NOTE — PHYSICAL THERAPY INITIAL EVALUATION ADULT - ADDITIONAL COMMENTS
Pt lives in apt with friend. Pt has no JJ and has elevator in apt. Pt has home health aide that assists with bed mobility and transfers. Pt has not walked since 2014 and has wheelchair in the apt. Pt needed assistance with sitting up out of bed and transferring onto the commode to go to bathroom.

## 2023-11-15 NOTE — PROGRESS NOTE ADULT - SUBJECTIVE AND OBJECTIVE BOX
Chief complaint: Patient is a 58y old  Female who presents with a chief complaint of Weakness (14 Nov 2023 09:51)    Interval history:  patient assessed in Am. Patient states "i feel ok". denies any acute distress.   PT eval pending    10 beats of NSVT noted on tele     Review of systems: a complete 10- point review of systems was obtained and is negative except as stated in the interval history.    Vitals:  T(F): 97.9, Max: 98.3 (11-14 @ 19:00)  HR: 90 (83 - 91)  BP: 122/60 (117/56 - 137/68)  RR: 18 (18 - 23)  SpO2: 92% (75% - 97%)    Ins & outs:     11-12 @ 07:01  -  11-13 @ 07:00  --------------------------------------------------------  IN: 1456 mL / OUT: 7655 mL / NET: -6199 mL    11-13 @ 07:01  -  11-14 @ 07:00  --------------------------------------------------------  IN: 1523 mL / OUT: 6330 mL / NET: -4807 mL    11-14 @ 07:01  -  11-15 @ 07:00  --------------------------------------------------------  IN: 640 mL / OUT: 4960 mL / NET: -4320 mL      Weight trend:  Weight (kg): 108 (11-10)    Physical exam:  General: No apparent distress  HEENT: Anicteric sclera. Moist mucous membranes. no JVD noted   Cardiac: Regular rate and rhythm. No murmurs, rubs, or gallops.   Vascular: Symmetric radial pulses. Dorsalis pedis pulses palpable.   Respiratory: Normal effort. fine Bibasilar crackles  Abdomen: Soft, nontender. Audible bowel sounds.   Extremities: Warm with trace edema L >R . No cyanosis or clubbing.   Skin: Warm and dry. No rash.   Neurologic: Grossly normal motor function.   Psychiatric: Euthymic. Oriented to person, place, and time.     Data reviewed:  - Telemetry: NSR , frequent PVC  - ECG < from: 12 Lead ECG (11.14.23 @ 06:02) >  Diagnosis Line Normal sinus rhythm  Possible Left atrial enlargement  Non-specific intra-ventricular conduction block  Lateral infarct , age undetermined  Abnormal ECG        - Echo < from: TTE Echo Complete w/o Contrast w/ Doppler (11.11.23 @ 08:19) >  Summary:   1. Severely decreased global left ventricular systolic function with   ejection fraction of 25-30% by visual estimate (echocontrast was used to   enhance endocardial wall visualization). The septum is   dyskinetic/flattened during diastole. Moderate (grade II) diastolic   dysfunction.   2. Severely enlarged right ventricle (RVEDD = 5.3cm) with low-normal   function.   3. Moderately enlarged right atrium.   4. Mildly enlarged left atrium.   5. Moderate-severe tricuspid regurgitation with hepatic vein flow   reversal.   6.Mild aortic valve stenosis (Vmax 2.4m/s, mean PG 12mmHg, SHILPI 1.79cm2).   7. Mild aortic regurgitation.   8. The mitral valve leaflets are tethered with trace regurgitation.   9. Severe pulmonary hypertension (PASP is at least 60mmHg). The IVC is   dilated (2.8cm) with <50% collapsibility indicating increased right   atrial pressure. PASP may be underestimated due to TR severity.  10. Small pericardial effusion without echocardiographic signs of   tamponade physiology.      - Radiology:  < from: Xray Chest 1 View- PORTABLE-Routine (Xray Chest 1 View- PORTABLE-Routine in AM.) (11.14.23 @ 05:45) >  Impression:    Stable enlarged cardiac silhouette and congestive changes.    - Labs:                        11.3   5.63  )-----------( 166      ( 15 Nov 2023 11:10 )             38.1     11-15    137  |  88<L>  |  27<H>  ----------------------------<  195<H>  3.7   |  39<H>  |  1.3    Ca    9.0      15 Nov 2023 11:10  Mg     1.4     11-15    TPro  7.7  /  Alb  3.4<L>  /  TBili  1.7<H>  /  DBili  x   /  AST  25  /  ALT  10  /  AlkPhos  161<H>  11-15    LIVER FUNCTIONS - ( 15 Nov 2023 11:10 )  Alb: 3.4 g/dL / Pro: 7.7 g/dL / ALK PHOS: 161 U/L / ALT: 10 U/L / AST: 25 U/L / GGT: x                     Urinalysis Basic - ( 15 Nov 2023 11:10 )    Color: x / Appearance: x / SG: x / pH: x  Gluc: 195 mg/dL / Ketone: x  / Bili: x / Urobili: x   Blood: x / Protein: x / Nitrite: x   Leuk Esterase: x / RBC: x / WBC x   Sq Epi: x / Non Sq Epi: x / Bacteria: x      - Cultures:    Medications:  albuterol/ipratropium for Nebulization 3 milliLiter(s) Nebulizer every 6 hours  atorvastatin 40 milliGRAM(s) Oral at bedtime  budesonide 160 MICROgram(s)/formoterol 4.5 MICROgram(s) Inhaler 2 Puff(s) Inhalation two times a day  ceFAZolin   IVPB 1000 milliGRAM(s) IV Intermittent every 12 hours  chlorhexidine 2% Cloths 1 Application(s) Topical daily  clopidogrel Tablet 75 milliGRAM(s) Oral daily  dapagliflozin 10 milliGRAM(s) Oral daily  heparin   Injectable 5000 Unit(s) SubCutaneous every 12 hours  insulin lispro (ADMELOG) corrective regimen sliding scale   SubCutaneous three times a day before meals  levothyroxine 25 MICROGram(s) Oral daily  magnesium sulfate  IVPB 2 Gram(s) IV Intermittent once  melatonin 5 milliGRAM(s) Oral at bedtime  pantoprazole    Tablet 40 milliGRAM(s) Oral before breakfast  potassium chloride    Tablet ER 20 milliEquivalent(s) Oral once  spironolactone 25 milliGRAM(s) Oral daily    Drips:  dextrose 5%. 1000 milliLiter(s) (50 mL/Hr) IV Continuous <Continuous>    PRN:

## 2023-11-15 NOTE — PROGRESS NOTE ADULT - NS ATTEND AMEND GEN_ALL_CORE FT
Patient seen and evaluated by me on 11/15/23.     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 She was admitted to the CCU for ADHF and LLE cellulitis. She was treated with BiPAP, dobutamine gtt, and Bumex gtt for the ADHF as well as vancomycin and cefepime for cellulitis. DVT study showed superficial occlusions but with reconstitution of flow distally, negative for DVT.     Plan:   - Increase Toprol to 50 mg daily as sitting having runs of NSVT  - IVC dilated and < 50% collapsing  - Start Entresto 24/26 mg BID  - Start Farxiga 10 mg daily  - Continue spironolactone 25 mg daily   - Give Bumex 1 mg IV x 1  - Last day of antibiotics  - Trial of void today

## 2023-11-15 NOTE — PHYSICAL THERAPY INITIAL EVALUATION ADULT - GENERAL OBSERVATIONS, REHAB EVAL
13:44-14:30. Pt encountered in bed. Pt agreeable to PT. + IV, + [place], +Tele, + Supplemental O2, + O2 Monitor,

## 2023-11-15 NOTE — PHYSICAL THERAPY INITIAL EVALUATION ADULT - PERTINENT HX OF CURRENT PROBLEM, REHAB EVAL
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  Patient has a home health aide, who called 911 today. Patient denies any fever or chills, cough, chest pain, dyspnea, abdominal pain, vomiting, diarrhea, new rash, any pain anywhere. Patient states that she has been having no appetite or energy over the past 5 days

## 2023-11-15 NOTE — PHYSICAL THERAPY INITIAL EVALUATION ADULT - RANGE OF MOTION EXAMINATION, REHAB EVAL
L UE AROM (<25%)/Right UE ROM was WFL (within functional limits)/bilateral lower extremity ROM was WFL (within functional limits)

## 2023-11-15 NOTE — PROGRESS NOTE ADULT - ASSESSMENT
57 y/o female mixed cardiomyopathy LVEF 20% w/ ICD, CAD PCI of RCA in 2021, severe pHTN class II/III, severe PAD, COPD on home O2, CVA (bedbound) presenting with ADH, JEMIMA on CKD, respiratory failure. Precipitating factor possibly 2/2 LLE cellulitis and/or medication noncompliance     IMPRESSION and PLAN:  #HFrEF (20%)  #ICM   #Severe pHTN class II/III  -11/14th while in CCU Discontinue Bumex 1MG iv and  given  Diamox 250gm IV d/t contraction alkalosis, CO2 41. UOP net negative was 4.3L   - today CO2 improved, creatinine stable; - IVC checked measuring 2.35cm  < 50% collapsible; bumex 1mg IV x1 given (takes torsemide 40mg po BID at home)   -10 beats of NSVT on tele this Am, toprol was increased to 50mg   -C/w spironolactone 25 PO QD  - if renal function continue to improve will plan to start on entresto in Am     #Severe PAD  #LLE cellulitis  -c/w cefazolin 1g q12 for suspected cellulitis of LLE, set to complete 5 day course TODAY   -LLE dvt scan with superficial occlusions but reconstitution of flow      #Acute hypoxic/hypercarbic respiratory failure  #Severe COPD  -On 4L NC, which is baseline O2 requirement  -C/w duonebs q6, budesonide BID for COPD  -Head of bed 45 degrees      #AKD on CKD  -Kidney function returning to baseline, Cr 1.3, BUN 33  -Replete Potassium, K>4  -Continue to monitor I&Os, electrolyte levels  - discontinue Wilkerson catheter today.  TOV today   -  PT eval ordered , ( bedbound at baseline)     # CAD  sp PCI of RCA in 2021,  -C/w clopidogrel, atorvastatin   - Cont BB       #DM  Hgb A1C 7.7 on 11/13  -Insulin sliding scale for DM  - F.S achs         CODE STATUS: Ful code  Diet:  soft and bite sized, mildly thick liquids, no straws       59 y/o female mixed cardiomyopathy LVEF 20% w/ ICD, CAD PCI of RCA in 2021, severe pHTN class II/III, severe PAD, COPD on home O2, CVA (bedbound) presenting with ADH, JEMIMA on CKD, respiratory failure. Precipitating factor possibly 2/2 LLE cellulitis and/or medication noncompliance     IMPRESSION and PLAN:  #HFrEF (20%)  #ICM   #Severe pHTN class II/III  -11/14th while in CCU Discontinue Bumex 1MG iv and  given  Diamox 250mg IV d/t contraction alkalosis, CO2 41. UOP net negative was 4.3L   - today CO2 improved, creatinine stable; - IVC checked measuring 2.35cm  < 50% collapsible; bumex 1mg IV x1 given (takes torsemide 40mg po BID at home)   -10 beats of NSVT on tele this Am, toprol was increased to 50mg   -C/w spironolactone 25 PO QD  - if renal function continue to improve will plan to start on entresto in Am     #Severe PAD  #LLE cellulitis  -c/w cefazolin 1g q12 for suspected cellulitis of LLE, set to complete 5 day course TODAY   -LLE dvt scan with superficial occlusions but reconstitution of flow      #Acute hypoxic/hypercarbic respiratory failure  #Severe COPD  -On 4L NC, which is baseline O2 requirement  -C/w duonebs q6, budesonide BID for COPD  -Head of bed 45 degrees      #AKD on CKD  -Kidney function returning to baseline, Cr 1.3, BUN 33  -Replete Potassium, K>4  -Continue to monitor I&Os, electrolyte levels  - discontinue Wilkerson catheter today.  TOV today   -  PT eval ordered , ( bedbound at baseline)     # CAD  sp PCI of RCA in 2021,  -C/w clopidogrel, atorvastatin   - Cont BB       #DM  Hgb A1C 7.7 on 11/13  -Insulin sliding scale for DM  - F.S achs         CODE STATUS: Ful code  Diet:  soft and bite sized, mildly thick liquids, no straws

## 2023-11-15 NOTE — ADVANCED PRACTICE NURSE CONSULT - ASSESSMENT
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  Patient has a home health aide, who called 911 today. Patient denies any fever or chills, cough, chest pain, dyspnea, abdominal pain, vomiting, diarrhea, new rash, any pain anywhere. Patient states that she has been having no appetite or energy over the past 5 days  Allergies and Intolerances:        Allergies:  	No Known Allergies:     Home Medications:   * Patient Currently Takes Medications as of 03-Feb-2023 20:20 documented in Structured Notes  •?	torsemide 40 mg oral tablet: 1 tab(s) orally 2 times a day   •?	metoprolol succinate 50 mg oral tablet, extended release: 1 tab(s) orally once a day  •?	atorvastatin 40 mg oral tablet: 1 tab(s) orally once a day   •?	sacubitril-valsartan 24 mg-26 mg oral tablet: 1 tab(s) orally 2 times a day  •?	fenofibrate 145 mg oral tablet: 1 tab(s) orally once a day  •?	ergocalciferol 1.25 mg (50,000 intl units) oral capsule: 1 cap(s) orally every 7 days   •?	budesonide-formoterol 160 mcg-4.5 mcg/inh inhalation aerosol: 2 puff(s) inhaled 2 times a day   •?	Albuterol (Eqv-ProAir HFA) 90 mcg/inh inhalation aerosol: 2 puff(s) inhaled every 6 hours, As Needed  •?	QUEtiapine 25 mg oral tablet: 1 tab(s) orally once a day (at bedtime)  •?	Plavix 75 mg oral tablet: 1 tab(s) orally once a day  •?	pantoprazole 40 mg oral delayed release tablet: 1 tab(s) orally once a day (before a meal)  •?	Synthroid 25 mcg (0.025 mg) oral tablet: 1 tab(s) orally once a day  •?	magnesium gluconate 500 mg oral tablet: 1 tab(s) orally 2 times a day  •?	Farxiga 10 mg oral tablet: 1 tab(s) orally once a day  Patient received lying in bed. Alert and oriented. Limited mobility. Wilkerson in place. High risk for pressure injury.     No skin breakdown noted on patient.  B/L heels intact.

## 2023-11-16 LAB
ANION GAP SERPL CALC-SCNC: 13 MMOL/L — SIGNIFICANT CHANGE UP (ref 7–14)
ANION GAP SERPL CALC-SCNC: 13 MMOL/L — SIGNIFICANT CHANGE UP (ref 7–14)
ANION GAP SERPL CALC-SCNC: 15 MMOL/L — HIGH (ref 7–14)
ANION GAP SERPL CALC-SCNC: 15 MMOL/L — HIGH (ref 7–14)
BUN SERPL-MCNC: 28 MG/DL — HIGH (ref 10–20)
CALCIUM SERPL-MCNC: 9.4 MG/DL — SIGNIFICANT CHANGE UP (ref 8.4–10.4)
CALCIUM SERPL-MCNC: 9.4 MG/DL — SIGNIFICANT CHANGE UP (ref 8.4–10.4)
CALCIUM SERPL-MCNC: 9.5 MG/DL — SIGNIFICANT CHANGE UP (ref 8.4–10.4)
CALCIUM SERPL-MCNC: 9.5 MG/DL — SIGNIFICANT CHANGE UP (ref 8.4–10.4)
CHLORIDE SERPL-SCNC: 91 MMOL/L — LOW (ref 98–110)
CHLORIDE SERPL-SCNC: 91 MMOL/L — LOW (ref 98–110)
CHLORIDE SERPL-SCNC: 92 MMOL/L — LOW (ref 98–110)
CHLORIDE SERPL-SCNC: 92 MMOL/L — LOW (ref 98–110)
CO2 SERPL-SCNC: 31 MMOL/L — SIGNIFICANT CHANGE UP (ref 17–32)
CO2 SERPL-SCNC: 31 MMOL/L — SIGNIFICANT CHANGE UP (ref 17–32)
CO2 SERPL-SCNC: 32 MMOL/L — SIGNIFICANT CHANGE UP (ref 17–32)
CO2 SERPL-SCNC: 32 MMOL/L — SIGNIFICANT CHANGE UP (ref 17–32)
CREAT SERPL-MCNC: 1.2 MG/DL — SIGNIFICANT CHANGE UP (ref 0.7–1.5)
CREAT SERPL-MCNC: 1.2 MG/DL — SIGNIFICANT CHANGE UP (ref 0.7–1.5)
CREAT SERPL-MCNC: 1.4 MG/DL — SIGNIFICANT CHANGE UP (ref 0.7–1.5)
CREAT SERPL-MCNC: 1.4 MG/DL — SIGNIFICANT CHANGE UP (ref 0.7–1.5)
EGFR: 44 ML/MIN/1.73M2 — LOW
EGFR: 44 ML/MIN/1.73M2 — LOW
EGFR: 53 ML/MIN/1.73M2 — LOW
EGFR: 53 ML/MIN/1.73M2 — LOW
GLUCOSE BLDC GLUCOMTR-MCNC: 137 MG/DL — HIGH (ref 70–99)
GLUCOSE BLDC GLUCOMTR-MCNC: 137 MG/DL — HIGH (ref 70–99)
GLUCOSE BLDC GLUCOMTR-MCNC: 152 MG/DL — HIGH (ref 70–99)
GLUCOSE BLDC GLUCOMTR-MCNC: 152 MG/DL — HIGH (ref 70–99)
GLUCOSE BLDC GLUCOMTR-MCNC: 204 MG/DL — HIGH (ref 70–99)
GLUCOSE BLDC GLUCOMTR-MCNC: 204 MG/DL — HIGH (ref 70–99)
GLUCOSE BLDC GLUCOMTR-MCNC: 221 MG/DL — HIGH (ref 70–99)
GLUCOSE BLDC GLUCOMTR-MCNC: 221 MG/DL — HIGH (ref 70–99)
GLUCOSE SERPL-MCNC: 122 MG/DL — HIGH (ref 70–99)
GLUCOSE SERPL-MCNC: 122 MG/DL — HIGH (ref 70–99)
GLUCOSE SERPL-MCNC: 158 MG/DL — HIGH (ref 70–99)
GLUCOSE SERPL-MCNC: 158 MG/DL — HIGH (ref 70–99)
HCT VFR BLD CALC: 39.3 % — SIGNIFICANT CHANGE UP (ref 37–47)
HCT VFR BLD CALC: 39.3 % — SIGNIFICANT CHANGE UP (ref 37–47)
HGB BLD-MCNC: 11.5 G/DL — LOW (ref 12–16)
HGB BLD-MCNC: 11.5 G/DL — LOW (ref 12–16)
MAGNESIUM SERPL-MCNC: 2.1 MG/DL — SIGNIFICANT CHANGE UP (ref 1.8–2.4)
MAGNESIUM SERPL-MCNC: 2.1 MG/DL — SIGNIFICANT CHANGE UP (ref 1.8–2.4)
MCHC RBC-ENTMCNC: 26 PG — LOW (ref 27–31)
MCHC RBC-ENTMCNC: 26 PG — LOW (ref 27–31)
MCHC RBC-ENTMCNC: 29.3 G/DL — LOW (ref 32–37)
MCHC RBC-ENTMCNC: 29.3 G/DL — LOW (ref 32–37)
MCV RBC AUTO: 88.7 FL — SIGNIFICANT CHANGE UP (ref 81–99)
MCV RBC AUTO: 88.7 FL — SIGNIFICANT CHANGE UP (ref 81–99)
NRBC # BLD: 0 /100 WBCS — SIGNIFICANT CHANGE UP (ref 0–0)
NRBC # BLD: 0 /100 WBCS — SIGNIFICANT CHANGE UP (ref 0–0)
PLATELET # BLD AUTO: 165 K/UL — SIGNIFICANT CHANGE UP (ref 130–400)
PLATELET # BLD AUTO: 165 K/UL — SIGNIFICANT CHANGE UP (ref 130–400)
PMV BLD: 11.1 FL — HIGH (ref 7.4–10.4)
PMV BLD: 11.1 FL — HIGH (ref 7.4–10.4)
POTASSIUM SERPL-MCNC: 4.4 MMOL/L — SIGNIFICANT CHANGE UP (ref 3.5–5)
POTASSIUM SERPL-MCNC: 4.4 MMOL/L — SIGNIFICANT CHANGE UP (ref 3.5–5)
POTASSIUM SERPL-MCNC: 4.7 MMOL/L — SIGNIFICANT CHANGE UP (ref 3.5–5)
POTASSIUM SERPL-MCNC: 4.7 MMOL/L — SIGNIFICANT CHANGE UP (ref 3.5–5)
POTASSIUM SERPL-SCNC: 4.4 MMOL/L — SIGNIFICANT CHANGE UP (ref 3.5–5)
POTASSIUM SERPL-SCNC: 4.4 MMOL/L — SIGNIFICANT CHANGE UP (ref 3.5–5)
POTASSIUM SERPL-SCNC: 4.7 MMOL/L — SIGNIFICANT CHANGE UP (ref 3.5–5)
POTASSIUM SERPL-SCNC: 4.7 MMOL/L — SIGNIFICANT CHANGE UP (ref 3.5–5)
RBC # BLD: 4.43 M/UL — SIGNIFICANT CHANGE UP (ref 4.2–5.4)
RBC # BLD: 4.43 M/UL — SIGNIFICANT CHANGE UP (ref 4.2–5.4)
RBC # FLD: 21.9 % — HIGH (ref 11.5–14.5)
RBC # FLD: 21.9 % — HIGH (ref 11.5–14.5)
SODIUM SERPL-SCNC: 136 MMOL/L — SIGNIFICANT CHANGE UP (ref 135–146)
SODIUM SERPL-SCNC: 136 MMOL/L — SIGNIFICANT CHANGE UP (ref 135–146)
SODIUM SERPL-SCNC: 138 MMOL/L — SIGNIFICANT CHANGE UP (ref 135–146)
SODIUM SERPL-SCNC: 138 MMOL/L — SIGNIFICANT CHANGE UP (ref 135–146)
WBC # BLD: 6.62 K/UL — SIGNIFICANT CHANGE UP (ref 4.8–10.8)
WBC # BLD: 6.62 K/UL — SIGNIFICANT CHANGE UP (ref 4.8–10.8)
WBC # FLD AUTO: 6.62 K/UL — SIGNIFICANT CHANGE UP (ref 4.8–10.8)
WBC # FLD AUTO: 6.62 K/UL — SIGNIFICANT CHANGE UP (ref 4.8–10.8)

## 2023-11-16 PROCEDURE — 99232 SBSQ HOSP IP/OBS MODERATE 35: CPT

## 2023-11-16 RX ORDER — METHYLPREDNISOLONE 4 MG
1 TABLET ORAL
Qty: 0 | Refills: 0 | DISCHARGE

## 2023-11-16 RX ORDER — BUMETANIDE 0.25 MG/ML
1 INJECTION INTRAMUSCULAR; INTRAVENOUS ONCE
Refills: 0 | Status: COMPLETED | OUTPATIENT
Start: 2023-11-16 | End: 2023-11-16

## 2023-11-16 RX ORDER — CLOPIDOGREL BISULFATE 75 MG/1
1 TABLET, FILM COATED ORAL
Refills: 0 | DISCHARGE

## 2023-11-16 RX ORDER — METOPROLOL TARTRATE 50 MG
1 TABLET ORAL
Refills: 0 | DISCHARGE

## 2023-11-16 RX ADMIN — SACUBITRIL AND VALSARTAN 1 TABLET(S): 24; 26 TABLET, FILM COATED ORAL at 17:07

## 2023-11-16 RX ADMIN — ATORVASTATIN CALCIUM 40 MILLIGRAM(S): 80 TABLET, FILM COATED ORAL at 21:20

## 2023-11-16 RX ADMIN — Medication 50 MILLIGRAM(S): at 05:22

## 2023-11-16 RX ADMIN — Medication 5 MILLIGRAM(S): at 21:20

## 2023-11-16 RX ADMIN — BUDESONIDE AND FORMOTEROL FUMARATE DIHYDRATE 2 PUFF(S): 160; 4.5 AEROSOL RESPIRATORY (INHALATION) at 09:32

## 2023-11-16 RX ADMIN — Medication 2: at 12:09

## 2023-11-16 RX ADMIN — BUMETANIDE 1 MILLIGRAM(S): 0.25 INJECTION INTRAMUSCULAR; INTRAVENOUS at 16:32

## 2023-11-16 RX ADMIN — SPIRONOLACTONE 25 MILLIGRAM(S): 25 TABLET, FILM COATED ORAL at 05:22

## 2023-11-16 RX ADMIN — HEPARIN SODIUM 5000 UNIT(S): 5000 INJECTION INTRAVENOUS; SUBCUTANEOUS at 05:20

## 2023-11-16 RX ADMIN — DAPAGLIFLOZIN 10 MILLIGRAM(S): 10 TABLET, FILM COATED ORAL at 12:09

## 2023-11-16 RX ADMIN — CHLORHEXIDINE GLUCONATE 1 APPLICATION(S): 213 SOLUTION TOPICAL at 12:08

## 2023-11-16 RX ADMIN — Medication 25 MICROGRAM(S): at 05:21

## 2023-11-16 RX ADMIN — SACUBITRIL AND VALSARTAN 1 TABLET(S): 24; 26 TABLET, FILM COATED ORAL at 05:20

## 2023-11-16 RX ADMIN — PANTOPRAZOLE SODIUM 40 MILLIGRAM(S): 20 TABLET, DELAYED RELEASE ORAL at 05:20

## 2023-11-16 RX ADMIN — CLOPIDOGREL BISULFATE 75 MILLIGRAM(S): 75 TABLET, FILM COATED ORAL at 12:10

## 2023-11-16 RX ADMIN — BUMETANIDE 1 MILLIGRAM(S): 0.25 INJECTION INTRAMUSCULAR; INTRAVENOUS at 10:56

## 2023-11-16 RX ADMIN — Medication 1: at 17:35

## 2023-11-16 RX ADMIN — HEPARIN SODIUM 5000 UNIT(S): 5000 INJECTION INTRAVENOUS; SUBCUTANEOUS at 17:07

## 2023-11-16 RX ADMIN — BUDESONIDE AND FORMOTEROL FUMARATE DIHYDRATE 2 PUFF(S): 160; 4.5 AEROSOL RESPIRATORY (INHALATION) at 21:49

## 2023-11-16 NOTE — SWALLOW BEDSIDE ASSESSMENT ADULT - PHARYNGEAL PHASE
for large consecutive sips. + toleration observed without overt symptoms of penetration/aspiration for single controlled sips./Cough post oral intake

## 2023-11-16 NOTE — SWALLOW BEDSIDE ASSESSMENT ADULT - SLP PERTINENT HISTORY OF CURRENT PROBLEM
Patient is a 58y old Female who presents with a chief complaint of Weakness (10 Nov 2023 18:57)
Patient is a 58y old Female who presents with a chief complaint of Weakness (10 Nov 2023 18:57)

## 2023-11-16 NOTE — SWALLOW BEDSIDE ASSESSMENT ADULT - NS SPL SWALLOW CLINIC TRIAL FT
+ toleration observed without overt symptoms of penetration/aspiration for REgular/thins via single controlled sips and multiple swallows

## 2023-11-16 NOTE — SWALLOW BEDSIDE ASSESSMENT ADULT - SWALLOW EVAL: DIAGNOSIS
A degree of oropharyngeal dysphagia likely 2/2 generalized weakness
EMS
FEES completed on 11/14 revealing: mild-mod pharyngeal dysphagia. Tolerated regular/thins via single cup sips and consecutive swallows w/o overt s/s aspiration today.
Ambulance

## 2023-11-16 NOTE — SWALLOW BEDSIDE ASSESSMENT ADULT - ASR SWALLOW ASPIRATION MONITOR
oral hygiene/position upright (90Y)/cough
change of breathing pattern/oral hygiene/position upright (90Y)/cough/gurgly voice/fever/pneumonia/throat clearing/upper respiratory infection

## 2023-11-16 NOTE — SWALLOW BEDSIDE ASSESSMENT ADULT - DIET PRIOR TO ADMI
Per Pt, she eating regular solid and thin liquids prior to admit. Denies Hx of dysphagia
Per Pt, she eating regular solid and thin liquids prior to admit. Denies Hx of dysphagia

## 2023-11-16 NOTE — PROGRESS NOTE ADULT - NS ATTEND AMEND GEN_ALL_CORE FT
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 She was admitted to the CCU for ADHF and LLE cellulitis. She was treated with BiPAP, dobutamine gtt, and Bumex gtt for the ADHF as well as vancomycin and cefepime for cellulitis. DVT study showed superficial occlusions but with reconstitution of flow distally, negative for DVT.     Plan:   - Continue Toprol 50 mg daily, Entresto 24/26 mg BID, spironolactone 25 mg daily, and Farxiga 10 mg daily   - Bumex 1 mg IV x 1  - Check IVC and decide if patient would benefit from BID dosing of Bumex  - Will consider uptitrating Toprol tomorrow  - Repeat ferritin, now that infection is treated  - Patient does not want a defibrillator, understands she is at risk for life-threatening heart rhythms   - Will investigate why she is on Plavix only

## 2023-11-16 NOTE — SWALLOW BEDSIDE ASSESSMENT ADULT - SWALLOW EVAL: FUNCTIONAL LEVEL AT TIME OF EVAL
Awake, alert. Recalled safe swallow strategy of "small sips" and "multiple swallows", however when she demonstrated the use of these strategies she took consecutive larger sips of thins.

## 2023-11-16 NOTE — SWALLOW BEDSIDE ASSESSMENT ADULT - SWALLOW EVAL: RECOMMENDED DIET
Regular/thins, Small single sips. Consecutive swallows
Soft and bite sized and mildly thick liquids as tolerated; NO STRAWS. Frequent cues required to eat. Maintain general aspiration precautions

## 2023-11-16 NOTE — PROGRESS NOTE ADULT - ASSESSMENT
Assessment	  57 y/o female mixed cardiomyopathy LVEF 20% w/ ICD, CAD PCI of RCA in 2021, severe pHTN class II/III, severe PAD, COPD on home O2, CVA (bedbound) presenting with ADH, JEMIMA on CKD, respiratory failure. Precipitating factor possibly 2/2 LLE cellulitis and/or medication noncompliance     IMPRESSION and PLAN:  #HFrEF (20%)  #ICM   #Severe pHTN class II/III  -11/14th while in CCU Discontinue Bumex 1MG iv and  given  Diamox 250mg IV d/t contraction alkalosis, CO2 41. UOP net negative was 4.3L   - today CO2 improved, creatinine stable; - IVC checked measuring 2.35cm  < 50% collapsible; bumex 1mg IV x1 given (takes torsemide 40mg po BID at home)   -10 beats of NSVT on tele YEST 11/15 toprol increased to 50mg   -C/w spironolactone 25 PO QD  - Entresto 24/26 started 11/15  - IVC check today 11/16 2.1 with some collapsibility , hepatic vein engorged  - Bumex 1mg x1 Today    #Severe PAD  #LLE cellulitis  -c/w cefazolin 1g q12 for suspected cellulitis of LLE, set to complete 5 day course yest 11/15  -LLE dvt scan with superficial occlusions but reconstitution of flow      #Acute hypoxic/hypercarbic respiratory failure  #Severe COPD  -On 4L NC, which is baseline O2 requirement  -C/w duonebs q6, budesonide BID for COPD  -Head of bed 45 degrees    #AKD on CKD  -Kidney function returning to baseline, Cr 1.3, BUN 33  -Replete Potassium, K>4  -Continue to monitor I&Os, electrolyte levels  - discontinue Wilkerson catheter 11/15, passed TOV  -  PT eval ordered , ( bedbound at baseline)     # CAD  sp PCI of RCA in 2021,  -C/w clopidogrel, atorvastatin   - Cont BB     #DM  Hgb A1C 7.7 on 11/13  -Insulin sliding scale for DM  - F.S achs     CODE STATUS: Full code  Diet:  soft and bite sized, mildly thick liquids, no straws    [id number]   Assessment	  57 y/o female HFrEF ef 25-30% sp BIV/ICD on 5/22 admitted with sepsis/ pocket infection positive blood cx and device removed on 11/23, CAD PCI of RCA in 2021, severe pHTN class II/III, severe PAD, COPD on 4l home O2, CVA (bedbound)with residual LLE weakness presenting with ADH, JEMIMA on CKD, respiratory failure. Precipitating factor possibly 2/2 LLE cellulitis and/or medication noncompliance     IMPRESSION and PLAN:  #HFrEF (20%)  #ICM   #Severe pHTN class II/III  -11/14th while in CCU Discontinue Bumex 1MG iv and  given  Diamox 250mg IV d/t contraction alkalosis, CO2 41. UOP net negative was 4.3L   - today CO2 improved, creatinine stable; - IVC checked measuring 2.35cm  < 50% collapsible; bumex 1mg IV x1 given (takes torsemide 40mg po BID at home)   -10 beats of NSVT on tele YEST 11/15 toprol increased to 50mg   -C/w spironolactone 25 PO QD  - Entresto 24/26 started 11/15  - IVC check today 11/16 2.1 with some collapsibility , hepatic vein engorged  - Bumex 1mg x1 Today    #Severe PAD  #LLE cellulitis  -c/w cefazolin 1g q12 for suspected cellulitis of LLE, set to complete 5 day course yest 11/15  -LLE dvt scan with superficial occlusions but reconstitution of flow      #Acute hypoxic/hypercarbic respiratory failure  #Severe COPD  -On 4L NC, which is baseline O2 requirement  -C/w duonebs q6, budesonide BID for COPD  -Head of bed 45 degrees    #AKD on CKD  -Kidney function returning to baseline, Cr 1.3, BUN 33  -Replete Potassium, K>4  -Continue to monitor I&Os, electrolyte levels  - discontinue Wilkerson catheter 11/15, passed TOV  -  PT eval ordered , ( bedbound at baseline)     # CAD  sp PCI of RCA in 2021,  -C/w clopidogrel, atorvastatin   - Cont BB     #DM  Hgb A1C 7.7 on 11/13  -Insulin sliding scale for DM  - F.S achs     CODE STATUS: Full code  Diet:  soft and bite sized, mildly thick liquids, no straws    [id number]

## 2023-11-16 NOTE — SWALLOW BEDSIDE ASSESSMENT ADULT - SWALLOW EVAL: FEEDING ASSISTANCE
LMOM, labs are all in normal range, Besides elevated cholesterol, recommended reducing fats in the diet, recheck in 6 months
for use of strategies/frequent cues/help required
frequent cues/help required

## 2023-11-16 NOTE — PROGRESS NOTE ADULT - SUBJECTIVE AND OBJECTIVE BOX
Chief complaint: Patient is a 58y old  Female who presents with a chief complaint of Weakness (15 Nov 2023 14:36)    Interval history: Patient seen and examined at bedside denies any cp/sob.     Review of systems: A complete 10-point review of systems was obtained and is negative except as stated in the interval history.    Vitals:  T(F): 98.2, Max: 98.4 (11-15 @ 15:50)  HR: 77 (77 - 86)  BP: 128/81 (123/68 - 157/89)  RR: 18 (17 - 21)  SpO2: 96% (93% - 96%)    Ins & outs:     11-13 @ 07:01  -  11-14 @ 07:00  --------------------------------------------------------  IN: 1523 mL / OUT: 6330 mL / NET: -4807 mL    11-14 @ 07:01  -  11-15 @ 07:00  --------------------------------------------------------  IN: 640 mL / OUT: 4960 mL / NET: -4320 mL    11-15 @ 07:01  -  11-16 @ 07:00  --------------------------------------------------------  IN: 780 mL / OUT: 1910 mL / NET: -1130 mL      Weight trend:      Physical exam:  General: No apparent distress  HEENT: Anicteric sclera. Moist mucous membranes. no JVD noted   Cardiac: Regular rate and rhythm. No murmurs, rubs, or gallops.   Vascular: Symmetric radial pulses. Dorsalis pedis pulses palpable.   Respiratory: Normal effort. fine Bibasilar crackles  Abdomen: Soft, nontender. Audible bowel sounds.   Extremities: Warm with trace edema L >R . No cyanosis or clubbing.   Skin: Warm and dry. No rash.   Neurologic: Grossly normal motor function.   Psychiatric: Euthymic. Oriented to person, place, and time.     Data reviewed:  - Telemetry: NSR , frequent PVCs  - ECG < from: 12 Lead ECG (11.14.23 @ 06:02) >  Diagnosis Line Normal sinus rhythm  Possible Left atrial enlargement  Non-specific intra-ventricular conduction block  Lateral infarct , age undetermined  Abnormal ECG        - Echo < from: TTE Echo Complete w/o Contrast w/ Doppler (11.11.23 @ 08:19) >  Summary:   1. Severely decreased global left ventricular systolic function with   ejection fraction of 25-30% by visual estimate (echocontrast was used to   enhance endocardial wall visualization). The septum is   dyskinetic/flattened during diastole. Moderate (grade II) diastolic   dysfunction.   2. Severely enlarged right ventricle (RVEDD = 5.3cm) with low-normal   function.   3. Moderately enlarged right atrium.   4. Mildly enlarged left atrium.   5. Moderate-severe tricuspid regurgitation with hepatic vein flow   reversal.   6.Mild aortic valve stenosis (Vmax 2.4m/s, mean PG 12mmHg, SHILPI 1.79cm2).   7. Mild aortic regurgitation.   8. The mitral valve leaflets are tethered with trace regurgitation.   9. Severe pulmonary hypertension (PASP is at least 60mmHg). The IVC is   dilated (2.8cm) with <50% collapsibility indicating increased right   atrial pressure. PASP may be underestimated due to TR severity.  10. Small pericardial effusion without echocardiographic signs of   tamponade physiology.      - Radiology:  < from: Xray Chest 1 View- PORTABLE-Routine (Xray Chest 1 View- PORTABLE-Routine in AM.) (11.14.23 @ 05:45) >  Impression:    Stable enlarged cardiac silhouette and congestive changes.    - Labs:                        11.5   6.62  )-----------( 165      ( 16 Nov 2023 06:07 )             39.3     11-16    136  |  92<L>  |  28<H>  ----------------------------<  122<H>  4.7   |  31  |  1.2    Ca    9.5      16 Nov 2023 06:07  Mg     2.1     11-16    TPro  7.7  /  Alb  3.4<L>  /  TBili  1.7<H>  /  DBili  x   /  AST  25  /  ALT  10  /  AlkPhos  161<H>  11-15      Troponin T, Serum: 0.06 ng/mL (11-10-23 @ 13:42)            Urinalysis Basic - ( 16 Nov 2023 06:07 )    Color: x / Appearance: x / SG: x / pH: x  Gluc: 122 mg/dL / Ketone: x  / Bili: x / Urobili: x   Blood: x / Protein: x / Nitrite: x   Leuk Esterase: x / RBC: x / WBC x   Sq Epi: x / Non Sq Epi: x / Bacteria: x        Medications:  albuterol/ipratropium for Nebulization 3 milliLiter(s) Nebulizer every 6 hours  atorvastatin 40 milliGRAM(s) Oral at bedtime  budesonide 160 MICROgram(s)/formoterol 4.5 MICROgram(s) Inhaler 2 Puff(s) Inhalation two times a day  chlorhexidine 2% Cloths 1 Application(s) Topical daily  clopidogrel Tablet 75 milliGRAM(s) Oral daily  dapagliflozin 10 milliGRAM(s) Oral daily  heparin   Injectable 5000 Unit(s) SubCutaneous every 12 hours  insulin lispro (ADMELOG) corrective regimen sliding scale   SubCutaneous three times a day before meals  levothyroxine 25 MICROGram(s) Oral daily  melatonin 5 milliGRAM(s) Oral at bedtime  metoprolol succinate ER 50 milliGRAM(s) Oral daily  pantoprazole    Tablet 40 milliGRAM(s) Oral before breakfast  sacubitril 24 mG/valsartan 26 mG 1 Tablet(s) Oral two times a day  spironolactone 25 milliGRAM(s) Oral daily    Drips:  dextrose 5%. 1000 milliLiter(s) (50 mL/Hr) IV Continuous <Continuous>    PRN:     Allergies    No Known Allergies    Intolerances

## 2023-11-16 NOTE — SWALLOW BEDSIDE ASSESSMENT ADULT - SWALLOW EVAL: CURRENT DIET
Currently on a medically prescribed regular solid and thin liquid diet
regular with mildly thick liquids per sunrise. (last SLP recs for regular/thins s/p FEES)
Weakness

## 2023-11-17 ENCOUNTER — TRANSCRIPTION ENCOUNTER (OUTPATIENT)
Age: 58
End: 2023-11-17

## 2023-11-17 LAB
ANION GAP SERPL CALC-SCNC: 15 MMOL/L — HIGH (ref 7–14)
ANION GAP SERPL CALC-SCNC: 15 MMOL/L — HIGH (ref 7–14)
BUN SERPL-MCNC: 29 MG/DL — HIGH (ref 10–20)
BUN SERPL-MCNC: 29 MG/DL — HIGH (ref 10–20)
CALCIUM SERPL-MCNC: 9.6 MG/DL — SIGNIFICANT CHANGE UP (ref 8.4–10.4)
CALCIUM SERPL-MCNC: 9.6 MG/DL — SIGNIFICANT CHANGE UP (ref 8.4–10.4)
CHLORIDE SERPL-SCNC: 93 MMOL/L — LOW (ref 98–110)
CHLORIDE SERPL-SCNC: 93 MMOL/L — LOW (ref 98–110)
CO2 SERPL-SCNC: 30 MMOL/L — SIGNIFICANT CHANGE UP (ref 17–32)
CO2 SERPL-SCNC: 30 MMOL/L — SIGNIFICANT CHANGE UP (ref 17–32)
CREAT SERPL-MCNC: 1.3 MG/DL — SIGNIFICANT CHANGE UP (ref 0.7–1.5)
CREAT SERPL-MCNC: 1.3 MG/DL — SIGNIFICANT CHANGE UP (ref 0.7–1.5)
EGFR: 48 ML/MIN/1.73M2 — LOW
EGFR: 48 ML/MIN/1.73M2 — LOW
FERRITIN SERPL-MCNC: 271 NG/ML — SIGNIFICANT CHANGE UP (ref 13–330)
FERRITIN SERPL-MCNC: 271 NG/ML — SIGNIFICANT CHANGE UP (ref 13–330)
GLUCOSE BLDC GLUCOMTR-MCNC: 138 MG/DL — HIGH (ref 70–99)
GLUCOSE BLDC GLUCOMTR-MCNC: 138 MG/DL — HIGH (ref 70–99)
GLUCOSE BLDC GLUCOMTR-MCNC: 147 MG/DL — HIGH (ref 70–99)
GLUCOSE BLDC GLUCOMTR-MCNC: 147 MG/DL — HIGH (ref 70–99)
GLUCOSE BLDC GLUCOMTR-MCNC: 150 MG/DL — HIGH (ref 70–99)
GLUCOSE BLDC GLUCOMTR-MCNC: 150 MG/DL — HIGH (ref 70–99)
GLUCOSE BLDC GLUCOMTR-MCNC: 166 MG/DL — HIGH (ref 70–99)
GLUCOSE BLDC GLUCOMTR-MCNC: 166 MG/DL — HIGH (ref 70–99)
GLUCOSE SERPL-MCNC: 151 MG/DL — HIGH (ref 70–99)
GLUCOSE SERPL-MCNC: 151 MG/DL — HIGH (ref 70–99)
HCT VFR BLD CALC: 36.9 % — LOW (ref 37–47)
HCT VFR BLD CALC: 36.9 % — LOW (ref 37–47)
HGB BLD-MCNC: 11 G/DL — LOW (ref 12–16)
HGB BLD-MCNC: 11 G/DL — LOW (ref 12–16)
MAGNESIUM SERPL-MCNC: 1.7 MG/DL — LOW (ref 1.8–2.4)
MAGNESIUM SERPL-MCNC: 1.7 MG/DL — LOW (ref 1.8–2.4)
MCHC RBC-ENTMCNC: 26.4 PG — LOW (ref 27–31)
MCHC RBC-ENTMCNC: 26.4 PG — LOW (ref 27–31)
MCHC RBC-ENTMCNC: 29.8 G/DL — LOW (ref 32–37)
MCHC RBC-ENTMCNC: 29.8 G/DL — LOW (ref 32–37)
MCV RBC AUTO: 88.5 FL — SIGNIFICANT CHANGE UP (ref 81–99)
MCV RBC AUTO: 88.5 FL — SIGNIFICANT CHANGE UP (ref 81–99)
NRBC # BLD: 0 /100 WBCS — SIGNIFICANT CHANGE UP (ref 0–0)
NRBC # BLD: 0 /100 WBCS — SIGNIFICANT CHANGE UP (ref 0–0)
PLATELET # BLD AUTO: 172 K/UL — SIGNIFICANT CHANGE UP (ref 130–400)
PLATELET # BLD AUTO: 172 K/UL — SIGNIFICANT CHANGE UP (ref 130–400)
PMV BLD: 10.7 FL — HIGH (ref 7.4–10.4)
PMV BLD: 10.7 FL — HIGH (ref 7.4–10.4)
POTASSIUM SERPL-MCNC: 4.4 MMOL/L — SIGNIFICANT CHANGE UP (ref 3.5–5)
POTASSIUM SERPL-MCNC: 4.4 MMOL/L — SIGNIFICANT CHANGE UP (ref 3.5–5)
POTASSIUM SERPL-SCNC: 4.4 MMOL/L — SIGNIFICANT CHANGE UP (ref 3.5–5)
POTASSIUM SERPL-SCNC: 4.4 MMOL/L — SIGNIFICANT CHANGE UP (ref 3.5–5)
RBC # BLD: 4.17 M/UL — LOW (ref 4.2–5.4)
RBC # BLD: 4.17 M/UL — LOW (ref 4.2–5.4)
RBC # FLD: 21.7 % — HIGH (ref 11.5–14.5)
RBC # FLD: 21.7 % — HIGH (ref 11.5–14.5)
SODIUM SERPL-SCNC: 138 MMOL/L — SIGNIFICANT CHANGE UP (ref 135–146)
SODIUM SERPL-SCNC: 138 MMOL/L — SIGNIFICANT CHANGE UP (ref 135–146)
WBC # BLD: 6.77 K/UL — SIGNIFICANT CHANGE UP (ref 4.8–10.8)
WBC # BLD: 6.77 K/UL — SIGNIFICANT CHANGE UP (ref 4.8–10.8)
WBC # FLD AUTO: 6.77 K/UL — SIGNIFICANT CHANGE UP (ref 4.8–10.8)
WBC # FLD AUTO: 6.77 K/UL — SIGNIFICANT CHANGE UP (ref 4.8–10.8)

## 2023-11-17 PROCEDURE — 99232 SBSQ HOSP IP/OBS MODERATE 35: CPT

## 2023-11-17 RX ORDER — SIMETHICONE 80 MG/1
80 TABLET, CHEWABLE ORAL DAILY
Refills: 0 | Status: DISCONTINUED | OUTPATIENT
Start: 2023-11-17 | End: 2023-11-17

## 2023-11-17 RX ORDER — MAGNESIUM SULFATE 500 MG/ML
2 VIAL (ML) INJECTION ONCE
Refills: 0 | Status: COMPLETED | OUTPATIENT
Start: 2023-11-17 | End: 2023-11-17

## 2023-11-17 RX ORDER — SIMETHICONE 80 MG/1
80 TABLET, CHEWABLE ORAL THREE TIMES A DAY
Refills: 0 | Status: DISCONTINUED | OUTPATIENT
Start: 2023-11-17 | End: 2023-11-19

## 2023-11-17 RX ORDER — SACUBITRIL AND VALSARTAN 24; 26 MG/1; MG/1
1 TABLET, FILM COATED ORAL
Refills: 0 | Status: DISCONTINUED | OUTPATIENT
Start: 2023-11-17 | End: 2023-11-19

## 2023-11-17 RX ORDER — IPRATROPIUM/ALBUTEROL SULFATE 18-103MCG
3 AEROSOL WITH ADAPTER (GRAM) INHALATION ONCE
Refills: 0 | Status: DISCONTINUED | OUTPATIENT
Start: 2023-11-17 | End: 2023-11-19

## 2023-11-17 RX ADMIN — SPIRONOLACTONE 25 MILLIGRAM(S): 25 TABLET, FILM COATED ORAL at 05:41

## 2023-11-17 RX ADMIN — Medication 50 MILLIGRAM(S): at 05:41

## 2023-11-17 RX ADMIN — SIMETHICONE 80 MILLIGRAM(S): 80 TABLET, CHEWABLE ORAL at 09:58

## 2023-11-17 RX ADMIN — PANTOPRAZOLE SODIUM 40 MILLIGRAM(S): 20 TABLET, DELAYED RELEASE ORAL at 05:41

## 2023-11-17 RX ADMIN — Medication 5 MILLIGRAM(S): at 21:46

## 2023-11-17 RX ADMIN — BUDESONIDE AND FORMOTEROL FUMARATE DIHYDRATE 2 PUFF(S): 160; 4.5 AEROSOL RESPIRATORY (INHALATION) at 08:07

## 2023-11-17 RX ADMIN — HEPARIN SODIUM 5000 UNIT(S): 5000 INJECTION INTRAVENOUS; SUBCUTANEOUS at 17:08

## 2023-11-17 RX ADMIN — ATORVASTATIN CALCIUM 40 MILLIGRAM(S): 80 TABLET, FILM COATED ORAL at 21:46

## 2023-11-17 RX ADMIN — Medication 25 GRAM(S): at 08:44

## 2023-11-17 RX ADMIN — SACUBITRIL AND VALSARTAN 1 TABLET(S): 24; 26 TABLET, FILM COATED ORAL at 05:42

## 2023-11-17 RX ADMIN — CLOPIDOGREL BISULFATE 75 MILLIGRAM(S): 75 TABLET, FILM COATED ORAL at 11:08

## 2023-11-17 RX ADMIN — BUDESONIDE AND FORMOTEROL FUMARATE DIHYDRATE 2 PUFF(S): 160; 4.5 AEROSOL RESPIRATORY (INHALATION) at 20:13

## 2023-11-17 RX ADMIN — Medication 40 MILLIGRAM(S): at 14:22

## 2023-11-17 RX ADMIN — DAPAGLIFLOZIN 10 MILLIGRAM(S): 10 TABLET, FILM COATED ORAL at 11:08

## 2023-11-17 RX ADMIN — SACUBITRIL AND VALSARTAN 1 TABLET(S): 24; 26 TABLET, FILM COATED ORAL at 17:09

## 2023-11-17 RX ADMIN — HEPARIN SODIUM 5000 UNIT(S): 5000 INJECTION INTRAVENOUS; SUBCUTANEOUS at 05:39

## 2023-11-17 RX ADMIN — Medication 1: at 08:06

## 2023-11-17 RX ADMIN — Medication 25 MICROGRAM(S): at 05:41

## 2023-11-17 NOTE — DISCHARGE NOTE PROVIDER - PROVIDER TOKENS
PROVIDER:[TOKEN:[[id number]],FOLLOWUP:[2 weeks]],PROVIDER:[TOKEN:[[id number]],FOLLOWUP:[1 month]]

## 2023-11-17 NOTE — PROGRESS NOTE ADULT - ASSESSMENT
Assessment	  57 y/o female HFrEF ef 25-30% sp BIV/ICD on 5/22 admitted with sepsis/ pocket infection positive blood cx and device removed on 11/23, CAD PCI of RCA in 2021, severe pHTN class II/III, severe PAD, COPD on 4l home O2, CVA (bedbound)with residual LLE weakness presenting with ADH, JEMIMA on CKD, respiratory failure. Precipitating factor possibly 2/2 LLE cellulitis and/or medication noncompliance     IMPRESSION and PLAN:  #HFrEF (20%)  #ICM   #Severe pHTN class II/III  -11/14th while in CCU Discontinue Bumex 1MG iv and  given  Diamox 250mg IV d/t contraction alkalosis, CO2 41. UOP net negative was 4.3L   - sp bumex 1mg IVP. pt near euvolemic,  Will switch to po torsemide 40mg po bid ( home dose)   - cont on  toprol  50mg po (10 beats of NSVT on tele 11/15)  -C/w spironolactone 25 PO QD  - cont on Entresto 24/26 started 11/15      #Severe PAD  #LLE cellulitis  -c/w cefazolin 1g q12 for suspected cellulitis of LLE, set to complete 5 day course 11/15  -LLE dvt scan with superficial occlusions but reconstitution of flow      #Acute hypoxic/hypercarbic respiratory failure  #Severe COPD  -On 4L NC, which is baseline O2 requirement  -C/w duonebs q6, budesonide BID for COPD  -Head of bed 45 degrees    #AKD on CKD- improving   -Kidney function returning to baseline, Cr 1.3, BUN 33  -Replete Potassium, K>4  -Continue to monitor I & Os, electrolyte levels  - discontinue Wilkerson catheter 11/15, passed TOV      # CAD  sp PCI of RCA in 2021,  # hx CVA   -C/w clopidogrel, atorvastatin   - Cont BB     #DM  Hgb A1C 7.7 on 11/13  -Insulin sliding scale for DM  - F.S ACHS      CODE STATUS: Full code  Diet:  soft and bite sized, mildly thick liquids, no straws  DISPOSITION: when medically cleared, to be discharge back home with VNS     [id number]

## 2023-11-17 NOTE — DISCHARGE NOTE PROVIDER - NSDCCPCAREPLAN_GEN_ALL_CORE_FT
PRINCIPAL DISCHARGE DIAGNOSIS  Diagnosis: Acute on chronic HFrEF (heart failure with reduced ejection fraction)  Assessment and Plan of Treatment: You have a diagnosis of Heart Failure. Please continue taking your medications as listed. Maintain a low salt diet, avoid drinking more than 1.5 Liters of fluid daily, and weigh yourself daily. For any significant increases in daily weight with associated shortness of breath or swelling in the legs or abdomen, please call your Doctor or go to the emergency room. Please follow up with Dr. Ge      SECONDARY DISCHARGE DIAGNOSES  Diagnosis: Cellulitis of right leg  Assessment and Plan of Treatment:     Diagnosis: Acute exacerbation of congestive heart failure  Assessment and Plan of Treatment:     Diagnosis: Acute on chronic kidney failure  Assessment and Plan of Treatment:      PRINCIPAL DISCHARGE DIAGNOSIS  Diagnosis: Acute on chronic HFrEF (heart failure with reduced ejection fraction)  Assessment and Plan of Treatment: You have a diagnosis of Heart Failure. Please continue taking your medications as listed. Maintain a low salt diet, avoid drinking more than 1.5 Liters of fluid daily, and weigh yourself daily. For any significant increases in daily weight with associated shortness of breath or swelling in the legs or abdomen, please call your Doctor or go to the emergency room. Please follow up with Dr. Ge  - take all medications as prescribed      SECONDARY DISCHARGE DIAGNOSES  Diagnosis: Cellulitis of right leg  Assessment and Plan of Treatment:     Diagnosis: Acute exacerbation of congestive heart failure  Assessment and Plan of Treatment:     Diagnosis: Acute on chronic kidney failure  Assessment and Plan of Treatment:

## 2023-11-17 NOTE — DISCHARGE NOTE PROVIDER - CARE PROVIDER_API CALL
Joel Soares  [facility]  [address] [place] [id number]  Phone: [phone]  Fax: [phone]  Follow Up Time: 2 weeks    Catina Valentine  Cardiology  [address] [place] [id number]  Phone: [phone]  Fax: [phone]  Follow Up Time: 1 month

## 2023-11-17 NOTE — PROGRESS NOTE ADULT - NS ATTEND AMEND GEN_ALL_CORE FT
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 She was admitted to the CCU for ADHF and LLE cellulitis. She was treated with BiPAP, dobutamine gtt, and Bumex gtt for the ADHF as well as vancomycin and cefepime for cellulitis. DVT study showed superficial occlusions but with reconstitution of flow distally, negative for DVT. Patient has now been downgraded to Cardiac Telemetry for further diuresis and will be started on PO diuretics today. Planning for discharge tomorrow.     Plan:  - Continue Toprol 50 mg daily, spironolactone 25 mg daily, and Farxiga 10 mg daily  - Start torsemide 40 mg PO BID (home dose)  - Increase Entresto to 49/51 mg BID  - Repeat ferritin pending, now that infection is treated  - Patient does not want a defibrillator, understands she is at risk for life-threatening heart rhythms  - On Plavix only, unable to get in touch with PCP

## 2023-11-17 NOTE — DISCHARGE NOTE NURSING/CASE MANAGEMENT/SOCIAL WORK - PATIENT PORTAL LINK FT
You can access the FollowMyHealth Patient Portal offered by [facility] by registering at the following website: [email or web address]. By joining [employer]’s FollowMyHealth portal, you will also be able to view your health information using other applications (apps) compatible with our system.

## 2023-11-17 NOTE — DISCHARGE NOTE PROVIDER - NSDCMRMEDTOKEN_GEN_ALL_CORE_FT
atorvastatin 40 mg oral tablet: 1 tab(s) orally once a day   Farxiga 10 mg oral tablet: 1 tab(s) orally once a day  furosemide 40 mg oral tablet: 1 tab(s) orally once a day  metoprolol succinate 50 mg oral capsule, extended release: 1 cap(s) orally once a day  Plavix 75 mg oral tablet: 1 tab(s) orally once a day  sacubitril-valsartan 24 mg-26 mg oral tablet: 1 tab(s) orally 2 times a day  Synthroid 25 mcg (0.025 mg) oral tablet: 1 tab(s) orally once a day   atorvastatin 40 mg oral tablet: 1 tab(s) orally once a day   Farxiga 10 mg oral tablet: 1 tab(s) orally once a day  magnesium gluconate 500 mg oral tablet: 1 tab(s) orally 2 times a day  metoprolol succinate 50 mg oral capsule, extended release: 1 cap(s) orally once a day  Plavix 75 mg oral tablet: 1 tab(s) orally once a day  sacubitril-valsartan 49 mg-51 mg oral tablet: 1 tab(s) orally 2 times a day  spironolactone 25 mg oral tablet: 1 tab(s) orally once a day  Synthroid 25 mcg (0.025 mg) oral tablet: 1 tab(s) orally once a day  torsemide 40 mg oral tablet: 1 tab(s) orally 2 times a day   atorvastatin 40 mg oral tablet: 1 tab(s) orally once a day   Farxiga 10 mg oral tablet: 1 tab(s) orally once a day  magnesium gluconate 500 mg oral tablet: 1 tab(s) orally 2 times a day  metoprolol succinate 50 mg oral capsule, extended release: 1 cap(s) orally once a day  Plavix 75 mg oral tablet: 1 tab(s) orally once a day  sacubitril-valsartan 49 mg-51 mg oral tablet: 1 tab(s) orally 2 times a day  spironolactone 25 mg oral tablet: 1 tab(s) orally once a day  Synthroid 25 mcg (0.025 mg) oral tablet: 1 tab(s) orally once a day  torsemide 40 mg oral tablet: 1 tab(s) orally once a day

## 2023-11-17 NOTE — DISCHARGE NOTE PROVIDER - ATTENDING DISCHARGE PHYSICAL EXAMINATION:
Patient seen and examined. Pertinent labs, imaging and telemetry reviewed. I agree with the above:     Patient feeling well. Some leg cramping overnight.   -Electrolytes WNL.   -May be due to diuresis.   RRR. S1S2 present.  CTA B/L   Ext WWP without pitting edema.     Patient is stable for discharge home with outpatient follow up.

## 2023-11-17 NOTE — PROGRESS NOTE ADULT - SUBJECTIVE AND OBJECTIVE BOX
Chief complaint: Patient is a 58y old  Female who presents with a chief complaint of Weakness (15 Nov 2023 14:36)    Interval history:   Patient seen and examined at bedside denies any cp/sob.   patient with c/o upset stomach this Am     Review of systems: A complete 10-point review of systems was obtained and is negative except as stated in the interval history.    Vitals:    T(C): 36.5 (17 Nov 2023 07:10), Max: 36.9 (16 Nov 2023 15:54)  T(F): 97.7 (17 Nov 2023 07:10), Max: 98.5 (16 Nov 2023 15:54)  HR: 79 (17 Nov 2023 10:14) (79 - 85)  BP: 159/74 (17 Nov 2023 07:10) (127/70 - 159/74)  BP(mean): 106 (17 Nov 2023 07:10) (93 - 106)    RR: 14 (17 Nov 2023 10:14) (14 - 18)  SpO2: 96% (17 Nov 2023 10:14) (94% - 96%)    O2 Parameters below as of 17 Nov 2023 10:14  Patient On (Oxygen Delivery Method): nasal cannula  O2 Flow (L/min): 3      Ins & outs:   I&O's Summary    16 Nov 2023 07:01  -  17 Nov 2023 07:00  --------------------------------------------------------  IN: 560 mL / OUT: 1300 mL / NET: -740 mL    17 Nov 2023 07:01  -  17 Nov 2023 13:54  --------------------------------------------------------  IN: 50 mL / OUT: 400 mL / NET: -350 mL      11-13 @ 07:01  -  11-14 @ 07:00  --------------------------------------------------------  IN: 1523 mL / OUT: 6330 mL / NET: -4807 mL    11-14 @ 07:01  -  11-15 @ 07:00  --------------------------------------------------------  IN: 640 mL / OUT: 4960 mL / NET: -4320 mL    11-15 @ 07:01  -  11-16 @ 07:00  --------------------------------------------------------  IN: 780 mL / OUT: 1910 mL / NET: -1130 mL      Weight trend:      Physical exam:  General: No apparent distress  HEENT: Anicteric sclera. Moist mucous membranes. no JVD noted   Cardiac: Regular rate and rhythm. No murmurs, rubs, or gallops.   Vascular: Symmetric radial pulses. Dorsalis pedis pulses palpable.   Respiratory: Normal effort. fine Bibasilar crackles  Abdomen: Soft, nontender. Audible bowel sounds.   Extremities: Warm with trace edema L >R . No cyanosis or clubbing.   Skin: Warm and dry. No rash.   Neurologic: Grossly normal motor function.   Psychiatric: Euthymic. Oriented to person, place, and time.     Data reviewed:  - Telemetry: NSR , frequent PVCs  - ECG < from: 12 Lead ECG (11.14.23 @ 06:02) >  Diagnosis Line Normal sinus rhythm  Possible Left atrial enlargement  Non-specific intra-ventricular conduction block  Lateral infarct , age undetermined  Abnormal ECG        - Echo < from: TTE Echo Complete w/o Contrast w/ Doppler (11.11.23 @ 08:19) >  Summary:   1. Severely decreased global left ventricular systolic function with   ejection fraction of 25-30% by visual estimate (echocontrast was used to   enhance endocardial wall visualization). The septum is   dyskinetic/flattened during diastole. Moderate (grade II) diastolic   dysfunction.   2. Severely enlarged right ventricle (RVEDD = 5.3cm) with low-normal   function.   3. Moderately enlarged right atrium.   4. Mildly enlarged left atrium.   5. Moderate-severe tricuspid regurgitation with hepatic vein flow   reversal.   6.Mild aortic valve stenosis (Vmax 2.4m/s, mean PG 12mmHg, SHILPI 1.79cm2).   7. Mild aortic regurgitation.   8. The mitral valve leaflets are tethered with trace regurgitation.   9. Severe pulmonary hypertension (PASP is at least 60mmHg). The IVC is   dilated (2.8cm) with <50% collapsibility indicating increased right   atrial pressure. PASP may be underestimated due to TR severity.  10. Small pericardial effusion without echocardiographic signs of   tamponade physiology.      - Radiology:  < from: Xray Chest 1 View- PORTABLE-Routine (Xray Chest 1 View- PORTABLE-Routine in AM.) (11.14.23 @ 05:45) >  Impression:    Stable enlarged cardiac silhouette and congestive changes.      - Labs:                        11.0   6.77  )-----------( 172      ( 17 Nov 2023 04:41 )             36.9     11-17    138  |  93<L>  |  29<H>  ----------------------------<  151<H>  4.4   |  30  |  1.3    Ca    9.6      17 Nov 2023 04:41  Mg     1.7     11-17        Lactate Trend    Urinalysis Basic - ( 17 Nov 2023 04:41 )    Color: x / Appearance: x / SG: x / pH: x  Gluc: 151 mg/dL / Ketone: x  / Bili: x / Urobili: x   Blood: x / Protein: x / Nitrite: x   Leuk Esterase: x / RBC: x / WBC x   Sq Epi: x / Non Sq Epi: x / Bacteria: x      Urinalysis Basic - ( 16 Nov 2023 06:07 )    Color: x / Appearance: x / SG: x / pH: x  Gluc: 122 mg/dL / Ketone: x  / Bili: x / Urobili: x   Blood: x / Protein: x / Nitrite: x   Leuk Esterase: x / RBC: x / WBC x   Sq Epi: x / Non Sq Epi: x / Bacteria: x        Medications:  albuterol/ipratropium for Nebulization 3 milliLiter(s) Nebulizer every 6 hours  atorvastatin 40 milliGRAM(s) Oral at bedtime  budesonide 160 MICROgram(s)/formoterol 4.5 MICROgram(s) Inhaler 2 Puff(s) Inhalation two times a day  chlorhexidine 2% Cloths 1 Application(s) Topical daily  clopidogrel Tablet 75 milliGRAM(s) Oral daily  dapagliflozin 10 milliGRAM(s) Oral daily  heparin   Injectable 5000 Unit(s) SubCutaneous every 12 hours  insulin lispro (ADMELOG) corrective regimen sliding scale   SubCutaneous three times a day before meals  levothyroxine 25 MICROGram(s) Oral daily  melatonin 5 milliGRAM(s) Oral at bedtime  metoprolol succinate ER 50 milliGRAM(s) Oral daily  pantoprazole    Tablet 40 milliGRAM(s) Oral before breakfast  sacubitril 24 mG/valsartan 26 mG 1 Tablet(s) Oral two times a day  spironolactone 25 milliGRAM(s) Oral daily    Drips:  dextrose 5%. 1000 milliLiter(s) (50 mL/Hr) IV Continuous <Continuous>    PRN:     Allergies    No Known Allergies    Intolerances

## 2023-11-17 NOTE — DISCHARGE NOTE PROVIDER - HOSPITAL COURSE
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 She was initially admitted to the CCU for ADHF and LLE cellulitis. She was treated with BiPAP, dobutamine gtt, and Bumex gtt for the ADHF as well as vancomycin and cefepime for cellulitis. DVT study showed superficial occlusions but with reconstitution of flow distally, negative for DVT. Patient was subsequently downgraded to Cardiac Telemetry for further diuresis.   While on cardiology telemetry, she was treated with bumex 1mg IVP for additional 2 days, switch to home dose of torsemide 40mg BID once euvolemic. She was continued on GDMT meds; spironolactone 25mg, entresto 24mg/26mg.   For frequent NSVT on tele, Toprol was increased to 50mg po. Detail discussion of reimplantation of ICD was had with patient as secondary prevention. Patient declined at this time.          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 She was initially admitted to the CCU for ADHF and LLE cellulitis. She was treated with BiPAP, dobutamine gtt, and Bumex gtt for the ADHF as well as vancomycin and cefepime for cellulitis. DVT study showed superficial occlusions but with reconstitution of flow distally, negative for DVT. Patient was subsequently downgraded to Cardiac Telemetry for further diuresis.   While on cardiology telemetry, she was treated with bumex 1mg IVP for additional 2 days, switch to home dose of torsemide 40mg BID. She was continued on GDMT meds; spironolactone 25mg, farxiga 10mg, entresto 49mg/51mg. At time of discharge, Creatinine went from 3.0 ( on admission)-- >>1.2 at time of discharge.   For frequent NSVT on tele, Toprol was increased to 50mg po. Detail discussion of reimplantation of ICD was had with patient as secondary prevention. Patient declined at this time.     Patient is hemodynamically stable for discharge home with VNS. She is to followup with cardiologist, Dr. Valentine in 3-4 weeks and heart failure specialist, Dr. Ge in 2 weeks.            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 She was initially admitted to the CCU for ADHF and LLE cellulitis. She was treated with BiPAP, dobutamine gtt, and Bumex gtt for the ADHF as well as vancomycin and cefepime for cellulitis. DVT study showed superficial occlusions but with reconstitution of flow distally, negative for DVT. Patient was subsequently downgraded to Cardiac Telemetry for further diuresis.   While on cardiology telemetry, she was treated with bumex 1mg IVP for additional 2 days, switch to torsemide 40mg once daily. She was continued on GDMT meds; spironolactone 25mg, farxiga 10mg, entresto 49mg/51mg. At time of discharge, Creatinine went from 3.0 ( on admission)-- >>1.2 at time of discharge.   For frequent NSVT on tele, Toprol was increased to 50mg po. Detail discussion of reimplantation of ICD was had with patient as secondary prevention. Patient declined at this time.     Patient is hemodynamically stable for discharge home with VNS. She is to followup with cardiologist, Dr. Valentine in 3-4 weeks and heart failure specialist, Dr. Ge in 2 weeks.            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 She was initially admitted to the CCU for ADHF and LLE cellulitis. She was treated with BiPAP, dobutamine gtt, and Bumex gtt for the ADHF as well as vancomycin and cefepime for cellulitis. DVT study showed superficial occlusions but with reconstitution of flow distally, negative for DVT. Patient was subsequently downgraded to Cardiac Telemetry for further diuresis.   While on cardiology telemetry, she was treated with bumex 1mg IVP for additional 2 days, switch to torsemide 40mg once daily. She was continued on GDMT meds; spironolactone 25mg, farxiga 10mg, entresto 49mg/51mg. At time of discharge, Creatinine went from 3.0 ( on admission)-- >>1.2 at time of discharge.   For frequent NSVT on tele, Toprol was increased to 50mg po. Detail discussion of reimplantation of ICD due to risk for life-threatening heart rhythms. Patient declined at this time.     Patient is hemodynamically stable for discharge home with VNS. She is to followup with cardiologist, Dr. Valentine in 3-4 weeks and heart failure specialist, Dr. Ge in 2 weeks.

## 2023-11-17 NOTE — DISCHARGE NOTE PROVIDER - NSDCCPGOAL_GEN_ALL_CORE_FT
To get better and follow your care plan as instructed.
This is a surgical and/or non-medical patient.

## 2023-11-17 NOTE — PROGRESS NOTE ADULT - NS ATTEND OPT1A GEN_ALL_CORE
leg pain, History/Exam/Medical decision making yes leg pain, Bilateral lower extremity recurrent peripheral vascular disease.

## 2023-11-18 LAB
ANION GAP SERPL CALC-SCNC: 13 MMOL/L — SIGNIFICANT CHANGE UP (ref 7–14)
ANION GAP SERPL CALC-SCNC: 13 MMOL/L — SIGNIFICANT CHANGE UP (ref 7–14)
BUN SERPL-MCNC: 31 MG/DL — HIGH (ref 10–20)
BUN SERPL-MCNC: 31 MG/DL — HIGH (ref 10–20)
CALCIUM SERPL-MCNC: 10 MG/DL — SIGNIFICANT CHANGE UP (ref 8.4–10.5)
CALCIUM SERPL-MCNC: 10 MG/DL — SIGNIFICANT CHANGE UP (ref 8.4–10.5)
CHLORIDE SERPL-SCNC: 97 MMOL/L — LOW (ref 98–110)
CHLORIDE SERPL-SCNC: 97 MMOL/L — LOW (ref 98–110)
CO2 SERPL-SCNC: 30 MMOL/L — SIGNIFICANT CHANGE UP (ref 17–32)
CO2 SERPL-SCNC: 30 MMOL/L — SIGNIFICANT CHANGE UP (ref 17–32)
CREAT SERPL-MCNC: 1.2 MG/DL — SIGNIFICANT CHANGE UP (ref 0.7–1.5)
CREAT SERPL-MCNC: 1.2 MG/DL — SIGNIFICANT CHANGE UP (ref 0.7–1.5)
EGFR: 53 ML/MIN/1.73M2 — LOW
EGFR: 53 ML/MIN/1.73M2 — LOW
GLUCOSE BLDC GLUCOMTR-MCNC: 131 MG/DL — HIGH (ref 70–99)
GLUCOSE BLDC GLUCOMTR-MCNC: 131 MG/DL — HIGH (ref 70–99)
GLUCOSE BLDC GLUCOMTR-MCNC: 134 MG/DL — HIGH (ref 70–99)
GLUCOSE BLDC GLUCOMTR-MCNC: 134 MG/DL — HIGH (ref 70–99)
GLUCOSE BLDC GLUCOMTR-MCNC: 156 MG/DL — HIGH (ref 70–99)
GLUCOSE BLDC GLUCOMTR-MCNC: 156 MG/DL — HIGH (ref 70–99)
GLUCOSE BLDC GLUCOMTR-MCNC: 162 MG/DL — HIGH (ref 70–99)
GLUCOSE BLDC GLUCOMTR-MCNC: 162 MG/DL — HIGH (ref 70–99)
GLUCOSE SERPL-MCNC: 135 MG/DL — HIGH (ref 70–99)
GLUCOSE SERPL-MCNC: 135 MG/DL — HIGH (ref 70–99)
HCT VFR BLD CALC: 40.9 % — SIGNIFICANT CHANGE UP (ref 37–47)
HCT VFR BLD CALC: 40.9 % — SIGNIFICANT CHANGE UP (ref 37–47)
HGB BLD-MCNC: 12.1 G/DL — SIGNIFICANT CHANGE UP (ref 12–16)
HGB BLD-MCNC: 12.1 G/DL — SIGNIFICANT CHANGE UP (ref 12–16)
MAGNESIUM SERPL-MCNC: 1.6 MG/DL — LOW (ref 1.8–2.4)
MAGNESIUM SERPL-MCNC: 1.6 MG/DL — LOW (ref 1.8–2.4)
MCHC RBC-ENTMCNC: 26.2 PG — LOW (ref 27–31)
MCHC RBC-ENTMCNC: 26.2 PG — LOW (ref 27–31)
MCHC RBC-ENTMCNC: 29.6 G/DL — LOW (ref 32–37)
MCHC RBC-ENTMCNC: 29.6 G/DL — LOW (ref 32–37)
MCV RBC AUTO: 88.5 FL — SIGNIFICANT CHANGE UP (ref 81–99)
MCV RBC AUTO: 88.5 FL — SIGNIFICANT CHANGE UP (ref 81–99)
NRBC # BLD: 0 /100 WBCS — SIGNIFICANT CHANGE UP (ref 0–0)
NRBC # BLD: 0 /100 WBCS — SIGNIFICANT CHANGE UP (ref 0–0)
PLATELET # BLD AUTO: 196 K/UL — SIGNIFICANT CHANGE UP (ref 130–400)
PLATELET # BLD AUTO: 196 K/UL — SIGNIFICANT CHANGE UP (ref 130–400)
PMV BLD: 10.8 FL — HIGH (ref 7.4–10.4)
PMV BLD: 10.8 FL — HIGH (ref 7.4–10.4)
POTASSIUM SERPL-MCNC: 4.8 MMOL/L — SIGNIFICANT CHANGE UP (ref 3.5–5)
POTASSIUM SERPL-MCNC: 4.8 MMOL/L — SIGNIFICANT CHANGE UP (ref 3.5–5)
POTASSIUM SERPL-SCNC: 4.8 MMOL/L — SIGNIFICANT CHANGE UP (ref 3.5–5)
POTASSIUM SERPL-SCNC: 4.8 MMOL/L — SIGNIFICANT CHANGE UP (ref 3.5–5)
RBC # BLD: 4.62 M/UL — SIGNIFICANT CHANGE UP (ref 4.2–5.4)
RBC # BLD: 4.62 M/UL — SIGNIFICANT CHANGE UP (ref 4.2–5.4)
RBC # FLD: 21.7 % — HIGH (ref 11.5–14.5)
RBC # FLD: 21.7 % — HIGH (ref 11.5–14.5)
SODIUM SERPL-SCNC: 140 MMOL/L — SIGNIFICANT CHANGE UP (ref 135–146)
SODIUM SERPL-SCNC: 140 MMOL/L — SIGNIFICANT CHANGE UP (ref 135–146)
WBC # BLD: 7.33 K/UL — SIGNIFICANT CHANGE UP (ref 4.8–10.8)
WBC # BLD: 7.33 K/UL — SIGNIFICANT CHANGE UP (ref 4.8–10.8)
WBC # FLD AUTO: 7.33 K/UL — SIGNIFICANT CHANGE UP (ref 4.8–10.8)
WBC # FLD AUTO: 7.33 K/UL — SIGNIFICANT CHANGE UP (ref 4.8–10.8)

## 2023-11-18 PROCEDURE — 99233 SBSQ HOSP IP/OBS HIGH 50: CPT

## 2023-11-18 RX ORDER — MAGNESIUM SULFATE 500 MG/ML
2 VIAL (ML) INJECTION
Refills: 0 | Status: COMPLETED | OUTPATIENT
Start: 2023-11-18 | End: 2023-11-18

## 2023-11-18 RX ADMIN — Medication 650 MILLIGRAM(S): at 05:53

## 2023-11-18 RX ADMIN — CHLORHEXIDINE GLUCONATE 1 APPLICATION(S): 213 SOLUTION TOPICAL at 11:17

## 2023-11-18 RX ADMIN — BUDESONIDE AND FORMOTEROL FUMARATE DIHYDRATE 2 PUFF(S): 160; 4.5 AEROSOL RESPIRATORY (INHALATION) at 19:40

## 2023-11-18 RX ADMIN — SPIRONOLACTONE 25 MILLIGRAM(S): 25 TABLET, FILM COATED ORAL at 05:54

## 2023-11-18 RX ADMIN — SACUBITRIL AND VALSARTAN 1 TABLET(S): 24; 26 TABLET, FILM COATED ORAL at 17:18

## 2023-11-18 RX ADMIN — HEPARIN SODIUM 5000 UNIT(S): 5000 INJECTION INTRAVENOUS; SUBCUTANEOUS at 17:21

## 2023-11-18 RX ADMIN — DAPAGLIFLOZIN 10 MILLIGRAM(S): 10 TABLET, FILM COATED ORAL at 11:17

## 2023-11-18 RX ADMIN — HEPARIN SODIUM 5000 UNIT(S): 5000 INJECTION INTRAVENOUS; SUBCUTANEOUS at 05:54

## 2023-11-18 RX ADMIN — Medication 25 MICROGRAM(S): at 05:54

## 2023-11-18 RX ADMIN — Medication 25 GRAM(S): at 09:31

## 2023-11-18 RX ADMIN — Medication 650 MILLIGRAM(S): at 21:11

## 2023-11-18 RX ADMIN — ATORVASTATIN CALCIUM 40 MILLIGRAM(S): 80 TABLET, FILM COATED ORAL at 21:53

## 2023-11-18 RX ADMIN — Medication 40 MILLIGRAM(S): at 05:53

## 2023-11-18 RX ADMIN — Medication 40 MILLIGRAM(S): at 14:15

## 2023-11-18 RX ADMIN — Medication 1: at 17:18

## 2023-11-18 RX ADMIN — SACUBITRIL AND VALSARTAN 1 TABLET(S): 24; 26 TABLET, FILM COATED ORAL at 05:53

## 2023-11-18 RX ADMIN — Medication 25 GRAM(S): at 11:17

## 2023-11-18 RX ADMIN — Medication 650 MILLIGRAM(S): at 06:53

## 2023-11-18 RX ADMIN — Medication 50 MILLIGRAM(S): at 07:13

## 2023-11-18 RX ADMIN — Medication 5 MILLIGRAM(S): at 21:53

## 2023-11-18 RX ADMIN — CLOPIDOGREL BISULFATE 75 MILLIGRAM(S): 75 TABLET, FILM COATED ORAL at 11:17

## 2023-11-18 RX ADMIN — PANTOPRAZOLE SODIUM 40 MILLIGRAM(S): 20 TABLET, DELAYED RELEASE ORAL at 06:00

## 2023-11-18 RX ADMIN — Medication 650 MILLIGRAM(S): at 20:11

## 2023-11-18 RX ADMIN — BUDESONIDE AND FORMOTEROL FUMARATE DIHYDRATE 2 PUFF(S): 160; 4.5 AEROSOL RESPIRATORY (INHALATION) at 09:31

## 2023-11-18 NOTE — PROGRESS NOTE ADULT - NS ATTEND AMEND GEN_ALL_CORE FT
Patient seen and examined. Pertinent labs, imaging and telemetry reviewed. I agree with the above:     Patient feeling better. She still does not feel ready to go home. She needs her keys and also will need HHA reinstated.   Net negative 1.3L overnight after switching to torsemide.   LE edema has improved.     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 She was admitted to the CCU for ADHF and LLE cellulitis. She was treated with BiPAP, dobutamine gtt, and Bumex gtt for the ADHF as well as vancomycin and cefepime for cellulitis. DVT study showed superficial occlusions but with reconstitution of flow distally, negative for DVT. Patient has now been downgraded to Cardiac Telemetry for further diuresis and will be started on PO diuretics today. Planning for discharge tomorrow vs Monday.     Plan:  - Continue Toprol 50 mg daily, spironolactone 25 mg daily, Farxiga 10 mg daily, torsemide 40 mg PO BID (home dose) and Entresto to 49/51 mg BID  - Repeat ferritin pending, now that infection is treated  - Patient does not want a defibrillator, understands she is at risk for life-threatening heart rhythms  - On Plavix only, unable to get in touch with PCP.    Discussed with patient and ACP.

## 2023-11-18 NOTE — PROGRESS NOTE ADULT - ASSESSMENT
Assessment	  57 y/o female HFrEF ef 25-30% sp BIV/ICD on 5/22 admitted with sepsis/ pocket infection positive blood cx and device removed on 11/23, CAD PCI of RCA in 2021, severe pHTN class II/III, severe PAD, COPD on 4l home O2, CVA (bedbound)with residual LLE weakness presenting with ADH, JEMIMA on CKD, respiratory failure. Precipitating factor possibly 2/2 LLE cellulitis and/or medication noncompliance     IMPRESSION and PLAN:  #HFrEF (20%)  #ICM   #Severe pHTN class II/III  -11/14th while in CCU Discontinue Bumex 1MG iv and  given  Diamox 250mg IV d/t contraction alkalosis, CO2 41. UOP net negative was 4.3L   - Started on Torsemide 40mg PO BID yest 10/17  - UO 1350 for the past 24 hr  - cont on  toprol  50mg po (10 beats of NSVT on tele 11/15)  -C/w spironolactone 25 PO QD  - Entresto increased yest 10/17 pm to 49/51      #Severe PAD  #LLE cellulitis  -c/w cefazolin 1g q12 for suspected cellulitis of LLE, set to complete 5 day course 11/15  -LLE dvt scan with superficial occlusions but reconstitution of flow      #Acute hypoxic/hypercarbic respiratory failure  #Severe COPD  -On 4L NC, which is baseline O2 requirement  -C/w duonebs q6, budesonide BID for COPD  -Head of bed 45 degrees    #AKD on CKD- improving   -Kidney function returning to baseline, Cr 1.3, BUN 33  -Replete Potassium, K>4  -Continue to monitor I & Os, electrolyte levels  - discontinue Wilkerson catheter 11/15, passed TOV      # CAD  sp PCI of RCA in 2021,  # hx CVA   -C/w clopidogrel, atorvastatin   - Cont BB     #DM  Hgb A1C 7.7 on 11/13  -Insulin sliding scale for DM  - F.S ACHS      CODE STATUS: Full code  Diet:  soft and bite sized, mildly thick liquids, no straws  DISPOSITION: Plan for DC back home with VNS poss tomorrow 11/19    [id number]     Assessment	  59 y/o female HFrEF ef 25-30% sp BIV/ICD on 5/22 admitted with sepsis/ pocket infection positive blood cx and device removed on 11/23, CAD PCI of RCA in 2021, severe pHTN class II/III, severe PAD, COPD on 4l home O2, CVA (bedbound)with residual LLE weakness presenting with ADH, JEMIMA on CKD, respiratory failure. Precipitating factor possibly 2/2 LLE cellulitis and/or medication noncompliance     IMPRESSION and PLAN:  #HFrEF (20%)  #ICM   #Severe pHTN class II/III  -11/14th while in CCU Discontinue Bumex 1MG iv and  given  Diamox 250mg IV d/t contraction alkalosis, CO2 41. UOP net negative was 4.3L   - Started on Torsemide 40mg PO BID yest 10/17  - UO 1350 for the past 24 hr  - cont on  toprol  50mg po (10 beats of NSVT on tele 11/15)  -C/w spironolactone 25 PO QD  - Entresto increased yest 10/17 pm to 49/51      #Severe PAD  #LLE cellulitis  -c/w cefazolin 1g q12 for suspected cellulitis of LLE.  -Completed abx on 11/15 (5 day course).   -LLE dvt scan with superficial occlusions but reconstitution of flow      #Acute hypoxic/hypercarbic respiratory failure  #Severe COPD  -On 4L NC, which is baseline O2 requirement  -C/w duonebs q6, budesonide BID for COPD  -Head of bed 45 degrees    #AKD on CKD- improving   -Kidney function returning to baseline, Cr 1.3, BUN 33  -Replete Potassium, K>4  -Continue to monitor I & Os, electrolyte levels  - discontinue Wilkerson catheter 11/15, passed TOV      # CAD  sp PCI of RCA in 2021,  # hx CVA   -C/w clopidogrel, atorvastatin   - Cont BB     #DM  Hgb A1C 7.7 on 11/13  -Insulin sliding scale for DM  - F.S ACHS      CODE STATUS: Full code  Diet:  soft and bite sized, mildly thick liquids, no straws  DISPOSITION: Plan for DC back home with VNS poss tomorrow 11/19    [phone]

## 2023-11-18 NOTE — PROGRESS NOTE ADULT - SUBJECTIVE AND OBJECTIVE BOX
Chief complaint: Patient is a 58y old  Female who presents with a chief complaint of Weakness (17 Nov 2023 16:12)    Interval history: Patient seen and examined at bedside. Denies any cp/sob.     Review of systems: A complete 10-point review of systems was obtained and is negative except as stated in the interval history.    Vitals:  T(F): 98.1, Max: 98.3 (11-17 @ 16:28)  HR: 86 (79 - 87)  BP: 165/80 (148/82 - 165/80)  RR: 17 (8 - 18)  SpO2: 98% (93% - 98%)    Ins & outs:     11-15 @ 07:01  -  11-16 @ 07:00  --------------------------------------------------------  IN: 780 mL / OUT: 1910 mL / NET: -1130 mL    11-16 @ 07:01  -  11-17 @ 07:00  --------------------------------------------------------  IN: 560 mL / OUT: 1300 mL / NET: -740 mL    11-17 @ 07:01  -  11-18 @ 07:00  --------------------------------------------------------  IN: 410 mL / OUT: 1350 mL / NET: -940 mL      Weight trend:      Physical exam:  General: No apparent distress  HEENT: Anicteric sclera. Moist mucous membranes. no JVD noted   Cardiac: Regular rate and rhythm. No murmurs, rubs, or gallops.   Vascular: Symmetric radial pulses. Dorsalis pedis pulses palpable.   Respiratory: Normal effort. fine Bibasilar crackles  Abdomen: Soft, nontender. Audible bowel sounds.   Extremities: No edema . No cyanosis or clubbing.   Skin: Warm and dry. No rash.   Neurologic: Grossly normal motor function.   Psychiatric: Euthymic. Oriented to person, place, and time.     Data reviewed:  - Telemetry: NSR HR 94  - ECG < from: 12 Lead ECG (11.14.23 @ 06:02) >  Diagnosis Line Normal sinus rhythm  Possible Left atrial enlargement  Non-specific intra-ventricular conduction block  Lateral infarct , age undetermined  Abnormal ECG        - Echo < from: TTE Echo Complete w/o Contrast w/ Doppler (11.11.23 @ 08:19) >  Summary:   1. Severely decreased global left ventricular systolic function with   ejection fraction of 25-30% by visual estimate (echocontrast was used to   enhance endocardial wall visualization). The septum is   dyskinetic/flattened during diastole. Moderate (grade II) diastolic   dysfunction.   2. Severely enlarged right ventricle (RVEDD = 5.3cm) with low-normal   function.   3. Moderately enlarged right atrium.   4. Mildly enlarged left atrium.   5. Moderate-severe tricuspid regurgitation with hepatic vein flow   reversal.   6.Mild aortic valve stenosis (Vmax 2.4m/s, mean PG 12mmHg, SHILPI 1.79cm2).   7. Mild aortic regurgitation.   8. The mitral valve leaflets are tethered with trace regurgitation.   9. Severe pulmonary hypertension (PASP is at least 60mmHg). The IVC is   dilated (2.8cm) with <50% collapsibility indicating increased right   atrial pressure. PASP may be underestimated due to TR severity.  10. Small pericardial effusion without echocardiographic signs of   tamponade physiology.      - Radiology:  < from: Xray Chest 1 View- PORTABLE-Routine (Xray Chest 1 View- PORTABLE-Routine in AM.) (11.14.23 @ 05:45) >  Impression:    Stable enlarged cardiac silhouette and congestive changes.        - Labs:                        12.1   7.33  )-----------( 196      ( 18 Nov 2023 05:31 )             40.9     11-18    140  |  97<L>  |  31<H>  ----------------------------<  135<H>  4.8   |  30  |  1.2    Ca    10.0      18 Nov 2023 05:31  Mg     1.6     11-18          Urinalysis Basic - ( 18 Nov 2023 05:31 )    Color: x / Appearance: x / SG: x / pH: x  Gluc: 135 mg/dL / Ketone: x  / Bili: x / Urobili: x   Blood: x / Protein: x / Nitrite: x   Leuk Esterase: x / RBC: x / WBC x   Sq Epi: x / Non Sq Epi: x / Bacteria: x        Medications:  atorvastatin 40 milliGRAM(s) Oral at bedtime  budesonide 160 MICROgram(s)/formoterol 4.5 MICROgram(s) Inhaler 2 Puff(s) Inhalation two times a day  chlorhexidine 2% Cloths 1 Application(s) Topical daily  clopidogrel Tablet 75 milliGRAM(s) Oral daily  dapagliflozin 10 milliGRAM(s) Oral daily  heparin   Injectable 5000 Unit(s) SubCutaneous every 12 hours  insulin lispro (ADMELOG) corrective regimen sliding scale   SubCutaneous three times a day before meals  levothyroxine 25 MICROGram(s) Oral daily  magnesium sulfate  IVPB 2 Gram(s) IV Intermittent every 2 hours  melatonin 5 milliGRAM(s) Oral at bedtime  metoprolol succinate ER 50 milliGRAM(s) Oral daily  pantoprazole    Tablet 40 milliGRAM(s) Oral before breakfast  sacubitril 49 mG/valsartan 51 mG 1 Tablet(s) Oral two times a day  spironolactone 25 milliGRAM(s) Oral daily  torsemide 40 milliGRAM(s) Oral two times a day    Drips:  dextrose 5%. 1000 milliLiter(s) (50 mL/Hr) IV Continuous <Continuous>    PRN:     Allergies    No Known Allergies    Intolerances

## 2023-11-19 VITALS
SYSTOLIC BLOOD PRESSURE: 119 MMHG | TEMPERATURE: 98 F | OXYGEN SATURATION: 95 % | RESPIRATION RATE: 20 BRPM | HEART RATE: 81 BPM | DIASTOLIC BLOOD PRESSURE: 91 MMHG

## 2023-11-19 LAB
ANION GAP SERPL CALC-SCNC: 16 MMOL/L — HIGH (ref 7–14)
ANION GAP SERPL CALC-SCNC: 16 MMOL/L — HIGH (ref 7–14)
BUN SERPL-MCNC: 35 MG/DL — HIGH (ref 10–20)
BUN SERPL-MCNC: 35 MG/DL — HIGH (ref 10–20)
CALCIUM SERPL-MCNC: 9.6 MG/DL — SIGNIFICANT CHANGE UP (ref 8.4–10.5)
CALCIUM SERPL-MCNC: 9.6 MG/DL — SIGNIFICANT CHANGE UP (ref 8.4–10.5)
CHLORIDE SERPL-SCNC: 91 MMOL/L — LOW (ref 98–110)
CHLORIDE SERPL-SCNC: 91 MMOL/L — LOW (ref 98–110)
CO2 SERPL-SCNC: 27 MMOL/L — SIGNIFICANT CHANGE UP (ref 17–32)
CO2 SERPL-SCNC: 27 MMOL/L — SIGNIFICANT CHANGE UP (ref 17–32)
CREAT SERPL-MCNC: 1.3 MG/DL — SIGNIFICANT CHANGE UP (ref 0.7–1.5)
CREAT SERPL-MCNC: 1.3 MG/DL — SIGNIFICANT CHANGE UP (ref 0.7–1.5)
EGFR: 48 ML/MIN/1.73M2 — LOW
EGFR: 48 ML/MIN/1.73M2 — LOW
GLUCOSE BLDC GLUCOMTR-MCNC: 142 MG/DL — HIGH (ref 70–99)
GLUCOSE BLDC GLUCOMTR-MCNC: 142 MG/DL — HIGH (ref 70–99)
GLUCOSE BLDC GLUCOMTR-MCNC: 169 MG/DL — HIGH (ref 70–99)
GLUCOSE BLDC GLUCOMTR-MCNC: 169 MG/DL — HIGH (ref 70–99)
GLUCOSE BLDC GLUCOMTR-MCNC: 174 MG/DL — HIGH (ref 70–99)
GLUCOSE BLDC GLUCOMTR-MCNC: 174 MG/DL — HIGH (ref 70–99)
GLUCOSE SERPL-MCNC: 201 MG/DL — HIGH (ref 70–99)
GLUCOSE SERPL-MCNC: 201 MG/DL — HIGH (ref 70–99)
MAGNESIUM SERPL-MCNC: 1.8 MG/DL — SIGNIFICANT CHANGE UP (ref 1.8–2.4)
MAGNESIUM SERPL-MCNC: 1.8 MG/DL — SIGNIFICANT CHANGE UP (ref 1.8–2.4)
POTASSIUM SERPL-MCNC: 4.3 MMOL/L — SIGNIFICANT CHANGE UP (ref 3.5–5)
POTASSIUM SERPL-MCNC: 4.3 MMOL/L — SIGNIFICANT CHANGE UP (ref 3.5–5)
POTASSIUM SERPL-SCNC: 4.3 MMOL/L — SIGNIFICANT CHANGE UP (ref 3.5–5)
POTASSIUM SERPL-SCNC: 4.3 MMOL/L — SIGNIFICANT CHANGE UP (ref 3.5–5)
SODIUM SERPL-SCNC: 134 MMOL/L — LOW (ref 135–146)
SODIUM SERPL-SCNC: 134 MMOL/L — LOW (ref 135–146)

## 2023-11-19 PROCEDURE — 99239 HOSP IP/OBS DSCHRG MGMT >30: CPT

## 2023-11-19 RX ORDER — METHYLPREDNISOLONE 4 MG
500 TABLET ORAL
Refills: 0 | Status: DISCONTINUED | OUTPATIENT
Start: 2023-11-19 | End: 2023-11-19

## 2023-11-19 RX ORDER — FUROSEMIDE 40 MG
1 TABLET ORAL
Refills: 0 | DISCHARGE

## 2023-11-19 RX ORDER — METHYLPREDNISOLONE 4 MG
1 TABLET ORAL
Qty: 60 | Refills: 1
Start: 2023-11-19 | End: 2024-01-17

## 2023-11-19 RX ORDER — SACUBITRIL AND VALSARTAN 24; 26 MG/1; MG/1
1 TABLET, FILM COATED ORAL
Qty: 60 | Refills: 0
Start: 2023-11-19 | End: 2023-12-18

## 2023-11-19 RX ORDER — SPIRONOLACTONE 25 MG/1
1 TABLET, FILM COATED ORAL
Qty: 30 | Refills: 0
Start: 2023-11-19 | End: 2023-12-18

## 2023-11-19 RX ADMIN — Medication 1: at 17:22

## 2023-11-19 RX ADMIN — CLOPIDOGREL BISULFATE 75 MILLIGRAM(S): 75 TABLET, FILM COATED ORAL at 11:14

## 2023-11-19 RX ADMIN — ATORVASTATIN CALCIUM 40 MILLIGRAM(S): 80 TABLET, FILM COATED ORAL at 21:36

## 2023-11-19 RX ADMIN — HEPARIN SODIUM 5000 UNIT(S): 5000 INJECTION INTRAVENOUS; SUBCUTANEOUS at 17:21

## 2023-11-19 RX ADMIN — Medication 500 MILLIGRAM(S): at 17:21

## 2023-11-19 RX ADMIN — BUDESONIDE AND FORMOTEROL FUMARATE DIHYDRATE 2 PUFF(S): 160; 4.5 AEROSOL RESPIRATORY (INHALATION) at 07:45

## 2023-11-19 RX ADMIN — Medication 1: at 12:34

## 2023-11-19 RX ADMIN — Medication 40 MILLIGRAM(S): at 06:16

## 2023-11-19 RX ADMIN — DAPAGLIFLOZIN 10 MILLIGRAM(S): 10 TABLET, FILM COATED ORAL at 11:14

## 2023-11-19 RX ADMIN — Medication 50 MILLIGRAM(S): at 06:16

## 2023-11-19 RX ADMIN — PANTOPRAZOLE SODIUM 40 MILLIGRAM(S): 20 TABLET, DELAYED RELEASE ORAL at 06:16

## 2023-11-19 RX ADMIN — SACUBITRIL AND VALSARTAN 1 TABLET(S): 24; 26 TABLET, FILM COATED ORAL at 06:16

## 2023-11-19 RX ADMIN — Medication 25 MICROGRAM(S): at 06:16

## 2023-11-19 RX ADMIN — Medication 40 MILLIGRAM(S): at 14:04

## 2023-11-19 RX ADMIN — HEPARIN SODIUM 5000 UNIT(S): 5000 INJECTION INTRAVENOUS; SUBCUTANEOUS at 06:15

## 2023-11-19 RX ADMIN — SACUBITRIL AND VALSARTAN 1 TABLET(S): 24; 26 TABLET, FILM COATED ORAL at 17:22

## 2023-11-19 RX ADMIN — SPIRONOLACTONE 25 MILLIGRAM(S): 25 TABLET, FILM COATED ORAL at 06:16

## 2023-11-19 RX ADMIN — BUDESONIDE AND FORMOTEROL FUMARATE DIHYDRATE 2 PUFF(S): 160; 4.5 AEROSOL RESPIRATORY (INHALATION) at 21:38

## 2023-11-20 NOTE — DISCHARGE NOTE PROVIDER - REASON FOR ADMISSION
Pre-procedure call complete.  Pt instructed not to eat or drink anything after midnight the night before procedure.  Pt aware will need someone to provide transport home and monitor pt 8 hours post procedure.  No driving for at least 24 hours after procedure.   Medications reviewed.  Arrival time and location given.  Expected length of stay reviewed.  Covid screening completed.  Pt verbalized understanding.   
Acute on Chronic CHF Exacerbation

## 2023-11-24 DIAGNOSIS — E11.51 TYPE 2 DIABETES MELLITUS WITH DIABETIC PERIPHERAL ANGIOPATHY WITHOUT GANGRENE: ICD-10-CM

## 2023-11-24 DIAGNOSIS — J96.22 ACUTE AND CHRONIC RESPIRATORY FAILURE WITH HYPERCAPNIA: ICD-10-CM

## 2023-11-24 DIAGNOSIS — I95.9 HYPOTENSION, UNSPECIFIED: ICD-10-CM

## 2023-11-24 DIAGNOSIS — Z99.81 DEPENDENCE ON SUPPLEMENTAL OXYGEN: ICD-10-CM

## 2023-11-24 DIAGNOSIS — E11.22 TYPE 2 DIABETES MELLITUS WITH DIABETIC CHRONIC KIDNEY DISEASE: ICD-10-CM

## 2023-11-24 DIAGNOSIS — Z79.84 LONG TERM (CURRENT) USE OF ORAL HYPOGLYCEMIC DRUGS: ICD-10-CM

## 2023-11-24 DIAGNOSIS — Z99.89 DEPENDENCE ON OTHER ENABLING MACHINES AND DEVICES: ICD-10-CM

## 2023-11-24 DIAGNOSIS — I36.1 NONRHEUMATIC TRICUSPID (VALVE) INSUFFICIENCY: ICD-10-CM

## 2023-11-24 DIAGNOSIS — N18.2 CHRONIC KIDNEY DISEASE, STAGE 2 (MILD): ICD-10-CM

## 2023-11-24 DIAGNOSIS — Z79.890 HORMONE REPLACEMENT THERAPY: ICD-10-CM

## 2023-11-24 DIAGNOSIS — I13.0 HYPERTENSIVE HEART AND CHRONIC KIDNEY DISEASE WITH HEART FAILURE AND STAGE 1 THROUGH STAGE 4 CHRONIC KIDNEY DISEASE, OR UNSPECIFIED CHRONIC KIDNEY DISEASE: ICD-10-CM

## 2023-11-24 DIAGNOSIS — Z95.5 PRESENCE OF CORONARY ANGIOPLASTY IMPLANT AND GRAFT: ICD-10-CM

## 2023-11-24 DIAGNOSIS — G47.33 OBSTRUCTIVE SLEEP APNEA (ADULT) (PEDIATRIC): ICD-10-CM

## 2023-11-24 DIAGNOSIS — I70.92 CHRONIC TOTAL OCCLUSION OF ARTERY OF THE EXTREMITIES: ICD-10-CM

## 2023-11-24 DIAGNOSIS — E78.5 HYPERLIPIDEMIA, UNSPECIFIED: ICD-10-CM

## 2023-11-24 DIAGNOSIS — E03.9 HYPOTHYROIDISM, UNSPECIFIED: ICD-10-CM

## 2023-11-24 DIAGNOSIS — I69.354 HEMIPLEGIA AND HEMIPARESIS FOLLOWING CEREBRAL INFARCTION AFFECTING LEFT NON-DOMINANT SIDE: ICD-10-CM

## 2023-11-24 DIAGNOSIS — K76.1 CHRONIC PASSIVE CONGESTION OF LIVER: ICD-10-CM

## 2023-11-24 DIAGNOSIS — I25.5 ISCHEMIC CARDIOMYOPATHY: ICD-10-CM

## 2023-11-24 DIAGNOSIS — Z53.29 PROCEDURE AND TREATMENT NOT CARRIED OUT BECAUSE OF PATIENT'S DECISION FOR OTHER REASONS: ICD-10-CM

## 2023-11-24 DIAGNOSIS — Z91.148 PATIENT'S OTHER NONCOMPLIANCE WITH MEDICATION REGIMEN FOR OTHER REASON: ICD-10-CM

## 2023-11-24 DIAGNOSIS — N17.9 ACUTE KIDNEY FAILURE, UNSPECIFIED: ICD-10-CM

## 2023-11-24 DIAGNOSIS — Z74.01 BED CONFINEMENT STATUS: ICD-10-CM

## 2023-11-24 DIAGNOSIS — I27.20 PULMONARY HYPERTENSION, UNSPECIFIED: ICD-10-CM

## 2023-11-24 DIAGNOSIS — Z79.51 LONG TERM (CURRENT) USE OF INHALED STEROIDS: ICD-10-CM

## 2023-11-24 DIAGNOSIS — E83.42 HYPOMAGNESEMIA: ICD-10-CM

## 2023-11-24 DIAGNOSIS — L03.115 CELLULITIS OF RIGHT LOWER LIMB: ICD-10-CM

## 2023-11-24 DIAGNOSIS — E87.3 ALKALOSIS: ICD-10-CM

## 2023-11-24 DIAGNOSIS — I50.23 ACUTE ON CHRONIC SYSTOLIC (CONGESTIVE) HEART FAILURE: ICD-10-CM

## 2023-11-24 DIAGNOSIS — L03.116 CELLULITIS OF LEFT LOWER LIMB: ICD-10-CM

## 2023-11-24 DIAGNOSIS — Z79.02 LONG TERM (CURRENT) USE OF ANTITHROMBOTICS/ANTIPLATELETS: ICD-10-CM

## 2023-11-24 DIAGNOSIS — J96.21 ACUTE AND CHRONIC RESPIRATORY FAILURE WITH HYPOXIA: ICD-10-CM

## 2023-11-24 DIAGNOSIS — J44.9 CHRONIC OBSTRUCTIVE PULMONARY DISEASE, UNSPECIFIED: ICD-10-CM

## 2023-11-24 DIAGNOSIS — I25.10 ATHEROSCLEROTIC HEART DISEASE OF NATIVE CORONARY ARTERY WITHOUT ANGINA PECTORIS: ICD-10-CM

## 2023-11-24 DIAGNOSIS — Z87.891 PERSONAL HISTORY OF NICOTINE DEPENDENCE: ICD-10-CM

## 2023-11-24 DIAGNOSIS — I47.20 VENTRICULAR TACHYCARDIA, UNSPECIFIED: ICD-10-CM

## 2023-11-28 NOTE — PROGRESS NOTE ADULT - SUBJECTIVE AND OBJECTIVE BOX
Disp Refills Start End    sertraline (ZOLOFT) 25 MG tablet 90 tablet 1 11/16/2023 --    Sig: Take  1 tab po daily      Rx refused note sent to pharmacy that patient should have refills. Rx sent 11/16/23.     24H events:    Patient is a 57y old Female who presents with a chief complaint of CHrEF Exacerbation, and  suspected Cellulitis from [facility] site. (20 Nov 2022 13:30)    Primary diagnosis of CHF exacerbation       Today is hospital day 10d. This morning patient was seen and examined at bedside, resting comfortably in bed.      PAST MEDICAL & SURGICAL HISTORY  Hypertension    Hyperlipidemia    Anxiety and depression    COPD, severe    CHF (congestive heart failure)    Cerebrovascular accident (CVA)  Multiple    Type 2 diabetes mellitus    CKD (chronic kidney disease), stage II    No significant past surgical history      SOCIAL HISTORY:  Social History:      ALLERGIES:  No Known Allergies    MEDICATIONS:  STANDING MEDICATIONS  aspirin enteric coated 81 milliGRAM(s) Oral daily  atorvastatin 80 milliGRAM(s) Oral at bedtime  budesonide 160 MICROgram(s)/formoterol 4.5 MICROgram(s) Inhaler 2 Puff(s) Inhalation two times a day  divalproex  milliGRAM(s) Oral two times a day  enoxaparin Injectable 40 milliGRAM(s) SubCutaneous every 24 hours  influenza   Vaccine 0.5 milliLiter(s) IntraMuscular once  insulin lispro (ADMELOG) corrective regimen sliding scale   SubCutaneous three times a day before meals  insulin lispro Injectable (ADMELOG) 2 Unit(s) SubCutaneous three times a day before meals  linezolid  IVPB 600 milliGRAM(s) IV Intermittent every 12 hours  linezolid  IVPB      magnesium sulfate  IVPB 2 Gram(s) IV Intermittent once  metoprolol succinate  milliGRAM(s) Oral daily  oxyCODONE    IR 5 milliGRAM(s) Oral once  pantoprazole    Tablet 40 milliGRAM(s) Oral before breakfast  polyethylene glycol 3350 17 Gram(s) Oral daily  sacubitril 49 mG/valsartan 51 mG 1 Tablet(s) Oral two times a day  spironolactone 50 milliGRAM(s) Oral daily  torsemide 20 milliGRAM(s) Oral daily  zolpidem 5 milliGRAM(s) Oral at bedtime    PRN MEDICATIONS  acetaminophen     Tablet .. 650 milliGRAM(s) Oral every 6 hours PRN  albuterol    90 MICROgram(s) HFA Inhaler 2 Puff(s) Inhalation every 6 hours PRN  loratadine 10 milliGRAM(s) Oral daily PRN  oxyCODONE    IR 5 milliGRAM(s) Oral every 6 hours PRN        LABS:                        10.8   5.05  )-----------( 186      ( 29 Nov 2022 04:54 )             34.0     11-29    135  |  88<L>  |  18  ----------------------------<  117<H>  4.3   |  35<H>  |  1.3    Ca    8.8      29 Nov 2022 04:54  Mg     1.9     11-29    TPro  6.1  /  Alb  3.2<L>  /  TBili  1.3<H>  /  DBili  x   /  AST  20  /  ALT  7   /  AlkPhos  124<H>  11-29                  RADIOLOGY:    VITALS:   T(F): 96.2  HR: 63  BP: 110/62  RR: 18  SpO2: 97%    PHYSICAL EXAM:    GENERAL: NAD, well-groomed, well-developed  HEAD:  Atraumatic, Normocephalic  EYES: EOMI  NECK: Supple  NERVOUS SYSTEM:  Alert & Oriented X3, motor 5/5 b/l upper and lower ext, sensation intact  CHEST/LUNG: Poor inspiratory effort  HEART: Regular rate and rhythm; No murmurs, rubs, or gallops  ABDOMEN: Soft, Nontender, Nondistended; Bowel sounds present  EXTREMITIES:  Warm, no pulse on palpation, No pitting edema  LYMPH: No lymphadenopathy noted  SKIN: No rashes or lesions

## 2023-12-05 RX ORDER — ASPIRIN 81 MG/1
81 TABLET, COATED ORAL
Qty: 90 | Refills: 3 | Status: DISCONTINUED | COMMUNITY
Start: 2023-09-21 | End: 2023-12-05

## 2023-12-05 RX ORDER — DAPAGLIFLOZIN 10 MG/1
10 TABLET, FILM COATED ORAL
Refills: 0 | Status: DISCONTINUED | COMMUNITY
End: 2023-12-05

## 2023-12-05 RX ORDER — TORSEMIDE 5 MG/1
TABLET ORAL
Refills: 0 | Status: DISCONTINUED | COMMUNITY
End: 2023-12-05

## 2023-12-05 RX ORDER — METFORMIN HYDROCHLORIDE 1000 MG/1
1000 TABLET, COATED ORAL
Refills: 0 | Status: DISCONTINUED | COMMUNITY
End: 2023-12-05

## 2023-12-05 RX ORDER — OXYCODONE HYDROCHLORIDE 30 MG/1
TABLET ORAL
Refills: 0 | Status: DISCONTINUED | COMMUNITY
End: 2023-12-05

## 2023-12-05 RX ORDER — DIVALPROEX SODIUM 250 MG/1
250 TABLET, DELAYED RELEASE ORAL
Refills: 0 | Status: DISCONTINUED | COMMUNITY
End: 2023-12-05

## 2023-12-05 RX ORDER — QUETIAPINE 25 MG/1
25 TABLET, FILM COATED ORAL
Refills: 0 | Status: DISCONTINUED | COMMUNITY
End: 2023-12-05

## 2023-12-05 RX ORDER — ALBUTEROL SULFATE 90 UG/1
INHALANT RESPIRATORY (INHALATION)
Refills: 0 | Status: DISCONTINUED | COMMUNITY
End: 2023-12-05

## 2023-12-05 RX ORDER — MAGNESIUM OXIDE 400 MG
TABLET ORAL
Refills: 0 | Status: DISCONTINUED | COMMUNITY
End: 2023-12-05

## 2023-12-05 RX ORDER — FENOFIBRATE 145 MG/1
145 TABLET, COATED ORAL
Refills: 0 | Status: DISCONTINUED | COMMUNITY
End: 2023-12-05

## 2023-12-05 RX ORDER — ERGOCALCIFEROL 1.25 MG/1
1.25 MG CAPSULE ORAL
Refills: 0 | Status: DISCONTINUED | COMMUNITY
End: 2023-12-05

## 2023-12-05 RX ORDER — OMEGA-3-ACID ETHYL ESTERS 1 G/1
CAPSULE, LIQUID FILLED ORAL
Refills: 0 | Status: DISCONTINUED | COMMUNITY
End: 2023-12-05

## 2023-12-05 RX ORDER — SACUBITRIL AND VALSARTAN 97; 103 MG/1; MG/1
97-103 TABLET, FILM COATED ORAL
Refills: 0 | Status: DISCONTINUED | COMMUNITY
End: 2023-12-05

## 2023-12-05 RX ORDER — ACETAMINOPHEN 325 MG/1
325 TABLET ORAL
Refills: 0 | Status: DISCONTINUED | COMMUNITY
End: 2023-12-05

## 2023-12-05 RX ORDER — BUDESONIDE AND FORMOTEROL FUMARATE DIHYDRATE 160; 4.5 UG/1; UG/1
160-4.5 AEROSOL RESPIRATORY (INHALATION)
Refills: 0 | Status: DISCONTINUED | COMMUNITY
End: 2023-12-05

## 2023-12-05 RX ORDER — QUETIAPINE FUMARATE 25 MG/1
25 TABLET ORAL
Refills: 0 | Status: DISCONTINUED | COMMUNITY
End: 2023-12-05

## 2023-12-19 ENCOUNTER — APPOINTMENT (OUTPATIENT)
Dept: CARDIOLOGY | Facility: CLINIC | Age: 58
End: 2023-12-19
Payer: MEDICARE

## 2023-12-19 VITALS
HEIGHT: 59 IN | BODY MASS INDEX: 39.11 KG/M2 | OXYGEN SATURATION: 92 % | WEIGHT: 194 LBS | SYSTOLIC BLOOD PRESSURE: 108 MMHG | HEART RATE: 83 BPM | DIASTOLIC BLOOD PRESSURE: 72 MMHG

## 2023-12-19 DIAGNOSIS — I50.9 HEART FAILURE, UNSPECIFIED: ICD-10-CM

## 2023-12-19 DIAGNOSIS — E11.9 TYPE 2 DIABETES MELLITUS W/OUT COMPLICATIONS: ICD-10-CM

## 2023-12-19 PROCEDURE — 93000 ELECTROCARDIOGRAM COMPLETE: CPT

## 2023-12-19 PROCEDURE — 99214 OFFICE O/P EST MOD 30 MIN: CPT

## 2023-12-19 NOTE — PROGRESS NOTE ADULT - SUBJECTIVE AND OBJECTIVE BOX
Hospital Day:  4d    Subjective: Patient is a 56y old  Female who presents with a chief complaint of CHF exacerbation (02 Dec 2021 08:35)      Pt seen and evaluated at bedside.   Complaints: None  Over the night Events: None    Past Medical Hx:   Hypertension    Hyperlipidemia    Anxiety and depression    COPD, severe    CHF (congestive heart failure)    Cerebrovascular accident (CVA)    Type 2 diabetes mellitus    CKD (chronic kidney disease), stage II      Past Sx:  No significant past surgical history    No significant past surgical history      Allergies:  No Known Allergies    Current Meds:   Standng Meds:  aspirin enteric coated 81 milliGRAM(s) Oral daily  atorvastatin 80 milliGRAM(s) Oral at bedtime  budesonide 160 MICROgram(s)/formoterol 4.5 MICROgram(s) Inhaler 2 Puff(s) Inhalation two times a day  buMETAnide Infusion 1 mG/Hr (5 mL/Hr) IV Continuous <Continuous>  chlorhexidine 4% Liquid 1 Application(s) Topical <User Schedule>  clopidogrel Tablet 75 milliGRAM(s) Oral daily  dextrose 40% Gel 15 Gram(s) Oral once  dextrose 5%. 1000 milliLiter(s) (50 mL/Hr) IV Continuous <Continuous>  dextrose 5%. 1000 milliLiter(s) (100 mL/Hr) IV Continuous <Continuous>  dextrose 50% Injectable 25 Gram(s) IV Push once  dextrose 50% Injectable 12.5 Gram(s) IV Push once  dextrose 50% Injectable 25 Gram(s) IV Push once  enoxaparin Injectable 40 milliGRAM(s) SubCutaneous daily  fenofibrate Tablet 145 milliGRAM(s) Oral daily  glucagon  Injectable 1 milliGRAM(s) IntraMuscular once  influenza   Vaccine 0.5 milliLiter(s) IntraMuscular once  insulin lispro (ADMELOG) corrective regimen sliding scale   SubCutaneous three times a day before meals  metoprolol succinate ER 50 milliGRAM(s) Oral daily  pantoprazole    Tablet 40 milliGRAM(s) Oral before breakfast  QUEtiapine 25 milliGRAM(s) Oral at bedtime  sacubitril 49 mG/valsartan 51 mG 1 Tablet(s) Oral two times a day  sodium chloride 3%. 150 milliLiter(s) (50 mL/Hr) IV Continuous <Continuous>  sodium chloride 3%. 150 milliLiter(s) (50 mL/Hr) IV Continuous <Continuous>  sodium chloride 3%. 150 milliLiter(s) (50 mL/Hr) IV Continuous <Continuous>  sodium chloride 3%. 150 milliLiter(s) (50 mL/Hr) IV Continuous <Continuous>  sodium chloride 3%. 150 milliLiter(s) (50 mL/Hr) IV Continuous <Continuous>  spironolactone 25 milliGRAM(s) Oral daily    PRN Meds:  ALBUTerol    90 MICROgram(s) HFA Inhaler 2 Puff(s) Inhalation every 6 hours PRN Shortness of Breath and/or Wheezing      Vital Signs:   T(F): 97.6 (12-03-21 @ 04:50), Max: 97.6 (12-03-21 @ 04:50)  HR: 72 (12-03-21 @ 08:00) (69 - 74)  BP: 122/66 (12-03-21 @ 08:00) (104/62 - 122/66)  RR: 20 (12-03-21 @ 08:57) (16 - 20)  SpO2: 98% (12-03-21 @ 08:57) (98% - 98%)    Physical Exam:   GENERAL: NAD, Resting in bed  HEENT: NCAT  CHEST/LUNG: Clear to auscultation bilaterally; No wheezing or rubs.   HEART: Regular rate and rhythm; No murmurs, rubs, or gallops  ABDOMEN: Bowel sounds present; Soft, Nontender, Nondistended.   EXTREMITIES:  No clubbing, cyanosis, or edema  NERVOUS SYSTEM:  Alert & Oriented X3    FLUID BALANCE    12-01-21 @ 07:01  -  12-02-21 @ 07:00  --------------------------------------------------------  IN: 1463 mL / OUT: 8100 mL / NET: -6637 mL    12-02-21 @ 07:01  -  12-03-21 @ 07:00  --------------------------------------------------------  IN: 1740 mL / OUT: 4500 mL / NET: -2760 mL    12-03-21 @ 07:01  -  12-03-21 @ 11:07  --------------------------------------------------------  IN: 330 mL / OUT: 0 mL / NET: 330 mL        Labs:                         15.4   5.29  )-----------( 309      ( 03 Dec 2021 07:40 )             48.7     Neutophil% 56.9, Lymphocyte% 29.3, Monocyte% 8.5, Bands% 0.4 12-03-21 @ 07:40    03 Dec 2021 07:40    140    |  92     |  20     ----------------------------<  101    4.4     |  25     |  0.9      Ca    9.3        03 Dec 2021 07:40  Mg     1.7       03 Dec 2021 07:40    TPro  7.2    /  Alb  3.9    /  TBili  1.2    /  DBili  x      /  AST  16     /  ALT  11     /  AlkPhos  99     03 Dec 2021 07:40            Serum Pro-Brain Natriuretic Peptide: 5736 pg/mL (11-29-21 @ 15:10)    Troponin 0.05, CKMB --, CK -- 11-29-21 @ 15:10                          Radiology:  Hospital Day:  4d    Subjective: Patient is a 56y old  Female who presents with a chief complaint of CHF exacerbation (02 Dec 2021 08:35)      Pt seen and evaluated at bedside.   Complaints: None  Over the night Events: None    Past Medical Hx:   Hypertension    Hyperlipidemia    Anxiety and depression    COPD, severe    CHF (congestive heart failure)    Cerebrovascular accident (CVA)    Type 2 diabetes mellitus    CKD (chronic kidney disease), stage II      Past Sx:  No significant past surgical history    No significant past surgical history      Allergies:  No Known Allergies    Current Meds:   Standng Meds:  aspirin enteric coated 81 milliGRAM(s) Oral daily  atorvastatin 80 milliGRAM(s) Oral at bedtime  budesonide 160 MICROgram(s)/formoterol 4.5 MICROgram(s) Inhaler 2 Puff(s) Inhalation two times a day  buMETAnide Infusion 1 mG/Hr (5 mL/Hr) IV Continuous <Continuous>  chlorhexidine 4% Liquid 1 Application(s) Topical <User Schedule>  clopidogrel Tablet 75 milliGRAM(s) Oral daily  dextrose 40% Gel 15 Gram(s) Oral once  dextrose 5%. 1000 milliLiter(s) (50 mL/Hr) IV Continuous <Continuous>  dextrose 5%. 1000 milliLiter(s) (100 mL/Hr) IV Continuous <Continuous>  dextrose 50% Injectable 25 Gram(s) IV Push once  dextrose 50% Injectable 12.5 Gram(s) IV Push once  dextrose 50% Injectable 25 Gram(s) IV Push once  enoxaparin Injectable 40 milliGRAM(s) SubCutaneous daily  fenofibrate Tablet 145 milliGRAM(s) Oral daily  glucagon  Injectable 1 milliGRAM(s) IntraMuscular once  influenza   Vaccine 0.5 milliLiter(s) IntraMuscular once  insulin lispro (ADMELOG) corrective regimen sliding scale   SubCutaneous three times a day before meals  metoprolol succinate ER 50 milliGRAM(s) Oral daily  pantoprazole    Tablet 40 milliGRAM(s) Oral before breakfast  QUEtiapine 25 milliGRAM(s) Oral at bedtime  sacubitril 49 mG/valsartan 51 mG 1 Tablet(s) Oral two times a day  sodium chloride 3%. 150 milliLiter(s) (50 mL/Hr) IV Continuous <Continuous>  sodium chloride 3%. 150 milliLiter(s) (50 mL/Hr) IV Continuous <Continuous>  sodium chloride 3%. 150 milliLiter(s) (50 mL/Hr) IV Continuous <Continuous>  sodium chloride 3%. 150 milliLiter(s) (50 mL/Hr) IV Continuous <Continuous>  sodium chloride 3%. 150 milliLiter(s) (50 mL/Hr) IV Continuous <Continuous>  spironolactone 25 milliGRAM(s) Oral daily    PRN Meds:  ALBUTerol    90 MICROgram(s) HFA Inhaler 2 Puff(s) Inhalation every 6 hours PRN Shortness of Breath and/or Wheezing      Vital Signs:   T(F): 97.6 (12-03-21 @ 04:50), Max: 97.6 (12-03-21 @ 04:50)  HR: 72 (12-03-21 @ 08:00) (69 - 74)  BP: 122/66 (12-03-21 @ 08:00) (104/62 - 122/66)  RR: 20 (12-03-21 @ 08:57) (16 - 20)  SpO2: 98% (12-03-21 @ 08:57) (98% - 98%)    Physical Exam:   GENERAL: NAD, Resting in bed  HEENT: NCAT  CHEST/LUNG: Clear to auscultation bilaterally; No wheezing or rubs.   HEART: Regular rate and rhythm; No murmurs, rubs, or gallops  ABDOMEN: Bowel sounds present; Soft, Nontender, Nondistended.   EXTREMITIES:  No clubbing, cyanosis, or edema  NERVOUS SYSTEM:  Alert & Oriented X3    FLUID BALANCE    12-01-21 @ 07:01  -  12-02-21 @ 07:00  --------------------------------------------------------  IN: 1463 mL / OUT: 8100 mL / NET: -6637 mL    12-02-21 @ 07:01  -  12-03-21 @ 07:00  --------------------------------------------------------  IN: 1740 mL / OUT: 4500 mL / NET: -2760 mL    12-03-21 @ 07:01  -  12-03-21 @ 11:07  --------------------------------------------------------  IN: 330 mL / OUT: 0 mL / NET: 330 mL        Labs:                         15.4   5.29  )-----------( 309      ( 03 Dec 2021 07:40 )             48.7     Neutophil% 56.9, Lymphocyte% 29.3, Monocyte% 8.5, Bands% 0.4 12-03-21 @ 07:40    03 Dec 2021 07:40    140    |  92     |  20     ----------------------------<  101    4.4     |  25     |  0.9      Ca    9.3        03 Dec 2021 07:40  Mg     1.7       03 Dec 2021 07:40    TPro  7.2    /  Alb  3.9    /  TBili  1.2    /  DBili  x      /  AST  16     /  ALT  11     /  AlkPhos  99     03 Dec 2021 07:40            Serum Pro-Brain Natriuretic Peptide: 5736 pg/mL (11-29-21 @ 15:10)    Troponin 0.05, CKMB --, CK -- 11-29-21 @ 15:10           1.63

## 2023-12-22 PROBLEM — I50.9 CHF (CONGESTIVE HEART FAILURE): Status: ACTIVE | Noted: 2022-12-29

## 2023-12-22 LAB
ALBUMIN SERPL ELPH-MCNC: 4.5 G/DL
ALP BLD-CCNC: 187 U/L
ALT SERPL-CCNC: 12 U/L
ANION GAP SERPL CALC-SCNC: 16 MMOL/L
AST SERPL-CCNC: 18 U/L
BILIRUB SERPL-MCNC: 0.5 MG/DL
BUN SERPL-MCNC: 33 MG/DL
CALCIUM SERPL-MCNC: 9.9 MG/DL
CHLORIDE SERPL-SCNC: 92 MMOL/L
CO2 SERPL-SCNC: 21 MMOL/L
CREAT SERPL-MCNC: 1.6 MG/DL
EGFR: 38 ML/MIN/1.73M2
GLUCOSE SERPL-MCNC: 143 MG/DL
MAGNESIUM SERPL-MCNC: 1.8 MG/DL
NT-PROBNP SERPL-MCNC: 1828 PG/ML
POTASSIUM SERPL-SCNC: 5.4 MMOL/L
PROT SERPL-MCNC: 8.6 G/DL
SODIUM SERPL-SCNC: 129 MMOL/L

## 2023-12-22 RX ORDER — METOPROLOL SUCCINATE 50 MG/1
50 TABLET, EXTENDED RELEASE ORAL DAILY
Qty: 30 | Refills: 3 | Status: ACTIVE | COMMUNITY
Start: 2023-12-22 | End: 1900-01-01

## 2023-12-22 RX ORDER — SACUBITRIL AND VALSARTAN 49; 51 MG/1; MG/1
49-51 TABLET, FILM COATED ORAL TWICE DAILY
Qty: 180 | Refills: 3 | Status: ACTIVE | COMMUNITY
Start: 1900-01-01 | End: 1900-01-01

## 2023-12-22 RX ORDER — LEVOTHYROXINE SODIUM 25 UG/1
25 TABLET ORAL DAILY
Refills: 0 | Status: ACTIVE | COMMUNITY

## 2023-12-22 RX ORDER — DAPAGLIFLOZIN 10 MG/1
10 TABLET, FILM COATED ORAL DAILY
Qty: 90 | Refills: 3 | Status: ACTIVE | COMMUNITY
Start: 1900-01-01 | End: 1900-01-01

## 2023-12-22 RX ORDER — SPIRONOLACTONE 25 MG/1
25 TABLET ORAL
Qty: 90 | Refills: 3 | Status: ACTIVE | COMMUNITY
Start: 1900-01-01 | End: 1900-01-01

## 2023-12-22 RX ORDER — MAGNESIUM GLUCONATE 27 MG(500)
500 TABLET ORAL TWICE DAILY
Refills: 0 | Status: ACTIVE | COMMUNITY

## 2023-12-22 NOTE — PHYSICAL EXAM
[Obese] : obese [Normal] : normal venous pressure, no carotid bruit [S3] : S3 [Clear Lung Fields] : clear lung fields [Good Air Entry] : good air entry [No Respiratory Distress] : no respiratory distress  [Soft] : abdomen soft [Non Tender] : non-tender [Edema ___] : edema [unfilled] [No Rash] : no rash [No Skin Lesions] : no skin lesions [Moves all extremities] : moves all extremities [No Focal Deficits] : no focal deficits [Normal Speech] : normal speech [Alert and Oriented] : alert and oriented [Normal memory] : normal memory [de-identified] : wheelchair bound

## 2023-12-22 NOTE — ASSESSMENT
[FreeTextEntry1] : ICM/HFrEF/SOB/Fluid overloaded  Fluid overloaded on exam POCUS IVC 3.0 cm  Continue Torsemide 40 mg daily (recommended to increase it, but patient prefers to stay at this dose) Continue Spironolactone 25 mg daily Continue Farxiga 10 mg daily Continue Entresto 49/51 mg BID Blood work today RTO in four weeks   The patient was counseled on lifestyle modifications to eat a heart-healthy diet, including sodium restriction and regular exercise. Also, she was instructed to keep a log of her blood pressure, heart rate, and daily weight. The weight is recommended to take first thing in the morning upon voiding. The blood pressure and heart rate can be taken anytime during the day.

## 2023-12-22 NOTE — HISTORY OF PRESENT ILLNESS
[FreeTextEntry1] : 59 y/o female with HFrEF with LVEF 25-30% sp  ICD removal due to ICD pocket site infection, CAD s/p PCI to RCA, CKD (baseline Cr 1.8), HTN, HLD, DM, CVA with residual left lower extremity weakness, CORNELIUS on CPAP, COPD on 4L nc, and hypothyroidism. She has been admitted multiple times for ADHF; her most recent admission was from 11/10 to 11/19. She is accompanied by her HHA and is here for a hospital follow-up.   The patient reports feeling well after leaving the hospital; she does not walk due to BLE weakness and requires assistance to move in and out of the wheelchair. She does not check her weight or BP at home. The patient denies experiencing chest pain, bleeding, shortness of breath at rest, paroxysmal nocturnal dyspnea (PND), abdominal discomfort, lightheadedness/dizziness, bilateral lower extremity edema, palpitations, and syncope.

## 2023-12-22 NOTE — REVIEW OF SYSTEMS
[Feeling Fatigued] : feeling fatigued [SOB] : shortness of breath [Dyspnea on exertion] : dyspnea during exertion [Lower Ext Edema] : lower extremity edema [Anxiety] : anxiety [Negative] : Heme/Lymph

## 2024-01-06 NOTE — ED ADULT NURSE NOTE - PRO INTERPRETER NEED 2
Care plan reviewed  Problem: Adult Inpatient Plan of Care  Goal: Plan of Care Review  Outcome: Ongoing, Progressing     Problem: Adult Inpatient Plan of Care  Goal: Patient-Specific Goal (Individualized)  Outcome: Ongoing, Progressing     Problem: Adult Inpatient Plan of Care  Goal: Absence of Hospital-Acquired Illness or Injury  Outcome: Ongoing, Progressing     Problem: Adult Inpatient Plan of Care  Goal: Optimal Comfort and Wellbeing  Outcome: Ongoing, Progressing      English

## 2024-01-18 ENCOUNTER — APPOINTMENT (OUTPATIENT)
Dept: CARDIOLOGY | Facility: CLINIC | Age: 59
End: 2024-01-18
Payer: MEDICARE

## 2024-01-18 VITALS
OXYGEN SATURATION: 97 % | HEART RATE: 86 BPM | BODY MASS INDEX: 44.35 KG/M2 | HEIGHT: 59 IN | DIASTOLIC BLOOD PRESSURE: 74 MMHG | WEIGHT: 220 LBS | SYSTOLIC BLOOD PRESSURE: 100 MMHG

## 2024-01-18 DIAGNOSIS — E87.70 FLUID OVERLOAD, UNSPECIFIED: ICD-10-CM

## 2024-01-18 DIAGNOSIS — I25.10 ATHEROSCLEROTIC HEART DISEASE OF NATIVE CORONARY ARTERY W/OUT ANGINA PECTORIS: ICD-10-CM

## 2024-01-18 DIAGNOSIS — I10 ESSENTIAL (PRIMARY) HYPERTENSION: ICD-10-CM

## 2024-01-18 DIAGNOSIS — I50.20 UNSPECIFIED SYSTOLIC (CONGESTIVE) HEART FAILURE: ICD-10-CM

## 2024-01-18 PROCEDURE — 99214 OFFICE O/P EST MOD 30 MIN: CPT

## 2024-01-18 RX ORDER — TORSEMIDE 20 MG/1
20 TABLET ORAL
Qty: 360 | Refills: 3 | Status: ACTIVE | COMMUNITY
Start: 1900-01-01 | End: 1900-01-01

## 2024-01-18 NOTE — REVIEW OF SYSTEMS
[Feeling Fatigued] : feeling fatigued [SOB] : shortness of breath [Dyspnea on exertion] : dyspnea during exertion [Lower Ext Edema] : lower extremity edema [Anxiety] : anxiety [Negative] : Heme/Lymph [Weight Gain (___ Lbs)] : [unfilled] ~Ulb weight gain [FreeTextEntry7] : Full

## 2024-01-18 NOTE — HISTORY OF PRESENT ILLNESS
[FreeTextEntry1] : 57 y/o female with HFrEF with LVEF 25-30% sp  ICD removal due to ICD pocket site infection, CAD s/p PCI to RCA, CKD (baseline Cr 1.8), HTN, HLD, DM, CVA with residual left lower extremity weakness, CORNELIUS on CPAP, COPD on 4L nc, and hypothyroidism. She has been admitted multiple times for ADHF; her most recent admission was from 11/10 to 11/19. She is accompanied by her HHA and is here for a follow-up.   The patient reports feeling bloated and "full." She does not walk due to BLE weakness and requires assistance to move in and out of the wheelchair. She does not check her weight or BP at home; her weight at D/C was 190 lbs, and today was 220 in the office. The patient denies experiencing chest pain, bleeding, shortness of breath at rest, paroxysmal nocturnal dyspnea (PND), lightheadedness/dizziness, bilateral lower extremity edema, palpitations, and syncope. She takes her medications as instructed; however, she does not follow a low-sodium diet.

## 2024-01-18 NOTE — PHYSICAL EXAM
[Obese] : obese [Normal] : normal venous pressure, no carotid bruit [S3] : S3 [Clear Lung Fields] : clear lung fields [Good Air Entry] : good air entry [No Respiratory Distress] : no respiratory distress  [Soft] : abdomen soft [Non Tender] : non-tender [Edema ___] : edema [unfilled] [No Rash] : no rash [No Skin Lesions] : no skin lesions [Moves all extremities] : moves all extremities [No Focal Deficits] : no focal deficits [Normal Speech] : normal speech [Alert and Oriented] : alert and oriented [Normal memory] : normal memory [de-identified] : wheelchair bound

## 2024-01-18 NOTE — ASSESSMENT
[FreeTextEntry1] : ICM/HFrEF/SOB/Fluid overloaded  Fluid overloaded on exam POCUS IVC 3.4 cm  Patient refused to get IV Lasix in the office today Increase Torsemide to 40 mg BID Continue Spironolactone 25 mg daily Continue Farxiga 10 mg daily Continue Entresto 49/51 mg BID Blood work today will call with reports RTO in eight weeks   The patient and her HHA were counseled on lifestyle modifications to eat a heart-healthy diet, including sodium restriction and regular exercise. Also, she was instructed to keep a log of her blood pressure, heart rate, and daily weight. The weight is recommended to take first thing in the morning upon voiding. The blood pressure and heart rate can be taken anytime during the day.

## 2024-01-19 LAB
ANION GAP SERPL CALC-SCNC: 9 MMOL/L
BUN SERPL-MCNC: 24 MG/DL
CALCIUM SERPL-MCNC: 9.2 MG/DL
CHLORIDE SERPL-SCNC: 103 MMOL/L
CO2 SERPL-SCNC: 29 MMOL/L
CREAT SERPL-MCNC: 1.5 MG/DL
EGFR: 41 ML/MIN/1.73M2
GLUCOSE SERPL-MCNC: 132 MG/DL
MAGNESIUM SERPL-MCNC: 2.3 MG/DL
NT-PROBNP SERPL-MCNC: 3291 PG/ML
POTASSIUM SERPL-SCNC: 5.4 MMOL/L
SODIUM SERPL-SCNC: 141 MMOL/L

## 2024-02-01 NOTE — PATIENT PROFILE ADULT - TRANSPORTATION
Pharmacist Clinic: Diabetes Management  Keith Fregoso is a 67 y.o. female was referred to Clinical Pharmacy Team for diabetes management. At last visit, no medication changes were made.     Referring Provider: Christopher D'Amico, DO     HISTORY OF PRESENT ILLNESS  Approximate Date of Diagnosis: approx 20 years ago  Diet: patient states she knows she does not eat healthy, she needs to work on it   Exercise Routine: minimal given her leg problems     LAB REVIEW   Glucose (mg/dL)   Date Value   12/01/2023 124 (H)   08/11/2023 146 (H)   05/12/2023 175 (H)   02/08/2023 150 (H)     Hemoglobin A1C (%)   Date Value   12/01/2023 8.3 (H)   08/11/2023 7.9 (A)   05/12/2023 8.3 (A)   02/08/2023 8.2 (A)     Bicarbonate (mmol/L)   Date Value   12/01/2023 28   08/11/2023 28   05/12/2023 25   02/08/2023 31     Urea Nitrogen (mg/dL)   Date Value   12/01/2023 18   08/11/2023 20   05/12/2023 18   02/08/2023 18     Creatinine (mg/dL)   Date Value   12/01/2023 0.68   08/11/2023 0.62   05/12/2023 0.57   02/08/2023 0.59     Lab Results   Component Value Date    CHOL 153 12/01/2023    CHOL 144 08/11/2023    CHOL 157 05/12/2023     Lab Results   Component Value Date    HDL 45.5 12/01/2023    HDL 38.9 (A) 08/11/2023    HDL 42.5 05/12/2023     Lab Results   Component Value Date    LDLCALC 84 12/01/2023     Lab Results   Component Value Date    TRIG 120 12/01/2023    TRIG 179 (H) 08/11/2023    TRIG 178 (H) 05/12/2023     DIABETES ASSESSMENT    CURRENT PHARMACOTHERAPY  - metformin 500 mg twice daily   - trulicity 4.5 mg under the skin once weekly    SECONDARY PREVENTION  - Statin? Yes  - ACE-I/ARB? Yes, patient appears to be non-adherent     HISTORICAL PHARMACOTHERAPY  - SGLT2i: recurrent yeast infections (2 yeast infections, both times A1c was over 9%)    SMBG MEASUREMENTS: AM readings around 140-150     DISCUSSION:  Patient is tolerating increased dose of trulicity (4.5 mg) she has given herself 2 injections and denies any new onset side  effects  Discussed retrying farxiga, now that your sugars are down I do not anticipate you are at as high of a risk for yeast infections     RECOMMENDATIONS/PLAN  1. Patients diabetes is poorly controlled with most recent A1c of 7.8 % (goal < 7 %).   - Continue all meds under the continuation of care with the referring provider and clinical pharmacy team  - Start farxiga 10 mg once daily.     Clinical Pharmacist follow up: 1 month    Thank you,  Alejandra Espinal, PharmD    Verbal consent to manage patient's drug therapy was obtained from the patient. They were informed they may decline to participate or withdraw from participation in pharmacy services at any time.   no

## 2024-02-22 NOTE — PATIENT PROFILE ADULT - FUNCTIONAL ASSESSMENT - BASIC MOBILITY 1.
"Subjective   Scarlet Gong is a 69 y.o. female.   A couple weeks out from laparoscopic cholecystectomy with intraoperative cholangiogram for gallstone pancreatitis.  In general she says she is feeling much better.  The skin itching is gone her dark discolored urine is improved she is tolerating a diet she says she is having regular bowel function she still has her back pain but her abdominal pain is better.    Objective   /76 (BP Location: Left arm, Patient Position: Sitting, Cuff Size: Large Adult)   Pulse 71   Temp 98.6 °F (37 °C) (Infrared)   Resp 18   Ht 157.5 cm (62\")   Wt 96.6 kg (213 lb)   SpO2 96%   BMI 38.96 kg/m²   Physical Exam  Exam abdomen is soft nondistended nontender to palpation incisions are all healing well without erythema drainage or evidence of hernia.  Assessment & Plan   Diagnoses and all orders for this visit:    1. Acute gallstone pancreatitis (Primary)    Pathology reviewed consistent with chronic cholecystitis with extensive mucosal erosions and cholelithiasis no evidence of malignancy.  The liver biopsy showed chronic hepatitis with early bridging fibrosis.  This was biopsy that was requested by gastroenterology and have asked that she follow-up with gastroenterology to discuss the hospital stay and the results of her liver biopsy.  From my standpoint she can increase her activity as tolerated diet as tolerated and follow-up as needed.  Tom Hernandez MD  2/22/2024  2:38 PM EST    This note was created using Dragon Voice Recognition software.  " 2 = A lot of assistance

## 2024-03-12 ENCOUNTER — APPOINTMENT (OUTPATIENT)
Dept: CARDIOLOGY | Facility: CLINIC | Age: 59
End: 2024-03-12

## 2024-03-13 NOTE — PATIENT PROFILE ADULT - NSTOBACCOWITHDRW_GEN_A_CORE_SD
Other (Free Text): Surgical estimate given to check out
Note Text (......Xxx Chief Complaint.): This diagnosis correlates with the
Detail Level: Zone
none
yes...

## 2024-05-02 NOTE — ED PROCEDURE NOTE - NS ED ATTENDING STATEMENT MOD
This was a shared visit with the OTONIEL. I reviewed and verified the documentation and independently performed the documented: 72 year old M with PMH ESRD on HD via left AVF MWF, CVA, BKA/AKA, functionally quadriplegic, CAD, HTN, HLD, history of O2 as needed presents for evaluation of vomiting.  Pt admitted for sepsis 2/2 colitis. Course c/b MRSA bacteremia.

## 2024-06-30 ENCOUNTER — INPATIENT (INPATIENT)
Facility: HOSPITAL | Age: 59
LOS: 10 days | Discharge: HOME HEALTH | DRG: 641 | End: 2024-07-11
Attending: STUDENT IN AN ORGANIZED HEALTH CARE EDUCATION/TRAINING PROGRAM | Admitting: STUDENT IN AN ORGANIZED HEALTH CARE EDUCATION/TRAINING PROGRAM
Payer: MEDICARE

## 2024-06-30 VITALS
HEART RATE: 74 BPM | TEMPERATURE: 98 F | RESPIRATION RATE: 20 BRPM | SYSTOLIC BLOOD PRESSURE: 92 MMHG | DIASTOLIC BLOOD PRESSURE: 65 MMHG | OXYGEN SATURATION: 100 %

## 2024-06-30 DIAGNOSIS — E87.1 HYPO-OSMOLALITY AND HYPONATREMIA: ICD-10-CM

## 2024-06-30 LAB
ALBUMIN SERPL ELPH-MCNC: 4.3 G/DL — SIGNIFICANT CHANGE UP (ref 3.5–5.2)
ALP SERPL-CCNC: 214 U/L — HIGH (ref 30–115)
ALT FLD-CCNC: 17 U/L — SIGNIFICANT CHANGE UP (ref 0–41)
ANION GAP SERPL CALC-SCNC: 18 MMOL/L — HIGH (ref 7–14)
ANION GAP SERPL CALC-SCNC: 22 MMOL/L — HIGH (ref 7–14)
APPEARANCE UR: ABNORMAL
APTT BLD: 31.7 SEC — SIGNIFICANT CHANGE UP (ref 27–39.2)
AST SERPL-CCNC: 23 U/L — SIGNIFICANT CHANGE UP (ref 0–41)
BACTERIA # UR AUTO: ABNORMAL /HPF
BASOPHILS # BLD AUTO: 0.09 K/UL — SIGNIFICANT CHANGE UP (ref 0–0.2)
BASOPHILS NFR BLD AUTO: 1.1 % — HIGH (ref 0–1)
BILIRUB SERPL-MCNC: 0.9 MG/DL — SIGNIFICANT CHANGE UP (ref 0.2–1.2)
BILIRUB UR-MCNC: NEGATIVE — SIGNIFICANT CHANGE UP
BUN SERPL-MCNC: 39 MG/DL — HIGH (ref 10–20)
BUN SERPL-MCNC: 42 MG/DL — HIGH (ref 10–20)
CALCIUM SERPL-MCNC: 8.8 MG/DL — SIGNIFICANT CHANGE UP (ref 8.4–10.4)
CALCIUM SERPL-MCNC: 9.4 MG/DL — SIGNIFICANT CHANGE UP (ref 8.4–10.5)
CAST: 0 /LPF — SIGNIFICANT CHANGE UP (ref 0–4)
CHLORIDE SERPL-SCNC: 72 MMOL/L — LOW (ref 98–110)
CHLORIDE SERPL-SCNC: 78 MMOL/L — LOW (ref 98–110)
CO2 SERPL-SCNC: 23 MMOL/L — SIGNIFICANT CHANGE UP (ref 17–32)
CO2 SERPL-SCNC: 23 MMOL/L — SIGNIFICANT CHANGE UP (ref 17–32)
COLOR SPEC: YELLOW — SIGNIFICANT CHANGE UP
CREAT SERPL-MCNC: 2.3 MG/DL — HIGH (ref 0.7–1.5)
CREAT SERPL-MCNC: 2.3 MG/DL — HIGH (ref 0.7–1.5)
DIFF PNL FLD: ABNORMAL
EGFR: 24 ML/MIN/1.73M2 — LOW
EGFR: 24 ML/MIN/1.73M2 — LOW
EOSINOPHIL # BLD AUTO: 0.1 K/UL — SIGNIFICANT CHANGE UP (ref 0–0.7)
EOSINOPHIL NFR BLD AUTO: 1.2 % — SIGNIFICANT CHANGE UP (ref 0–8)
GLUCOSE BLDC GLUCOMTR-MCNC: 159 MG/DL — HIGH (ref 70–99)
GLUCOSE SERPL-MCNC: 128 MG/DL — HIGH (ref 70–99)
GLUCOSE SERPL-MCNC: 155 MG/DL — HIGH (ref 70–99)
GLUCOSE UR QL: 100 MG/DL
HCT VFR BLD CALC: 27.6 % — LOW (ref 37–47)
HGB BLD-MCNC: 10 G/DL — LOW (ref 12–16)
IMM GRANULOCYTES NFR BLD AUTO: 0.8 % — HIGH (ref 0.1–0.3)
INR BLD: 0.94 RATIO — SIGNIFICANT CHANGE UP (ref 0.65–1.3)
KETONES UR-MCNC: NEGATIVE MG/DL — SIGNIFICANT CHANGE UP
LACTATE SERPL-SCNC: 1.2 MMOL/L — SIGNIFICANT CHANGE UP (ref 0.7–2)
LEUKOCYTE ESTERASE UR-ACNC: ABNORMAL
LYMPHOCYTES # BLD AUTO: 1.02 K/UL — LOW (ref 1.2–3.4)
LYMPHOCYTES # BLD AUTO: 12.3 % — LOW (ref 20.5–51.1)
MAGNESIUM SERPL-MCNC: 1.6 MG/DL — LOW (ref 1.8–2.4)
MAGNESIUM SERPL-MCNC: 1.6 MG/DL — LOW (ref 1.8–2.4)
MCHC RBC-ENTMCNC: 28.2 PG — SIGNIFICANT CHANGE UP (ref 27–31)
MCHC RBC-ENTMCNC: 36.2 G/DL — SIGNIFICANT CHANGE UP (ref 32–37)
MCV RBC AUTO: 77.7 FL — LOW (ref 81–99)
MONOCYTES # BLD AUTO: 0.53 K/UL — SIGNIFICANT CHANGE UP (ref 0.1–0.6)
MONOCYTES NFR BLD AUTO: 6.4 % — SIGNIFICANT CHANGE UP (ref 1.7–9.3)
NEUTROPHILS # BLD AUTO: 6.46 K/UL — SIGNIFICANT CHANGE UP (ref 1.4–6.5)
NEUTROPHILS NFR BLD AUTO: 78.2 % — HIGH (ref 42.2–75.2)
NITRITE UR-MCNC: NEGATIVE — SIGNIFICANT CHANGE UP
NRBC # BLD: 0 /100 WBCS — SIGNIFICANT CHANGE UP (ref 0–0)
NT-PROBNP SERPL-SCNC: 922 PG/ML — HIGH (ref 0–300)
OSMOLALITY SERPL: 261 MOS/KG — LOW (ref 275–300)
PH UR: 6 — SIGNIFICANT CHANGE UP (ref 5–8)
PLATELET # BLD AUTO: 216 K/UL — SIGNIFICANT CHANGE UP (ref 130–400)
PMV BLD: 10.4 FL — SIGNIFICANT CHANGE UP (ref 7.4–10.4)
POTASSIUM SERPL-MCNC: 3 MMOL/L — LOW (ref 3.5–5)
POTASSIUM SERPL-MCNC: 3.3 MMOL/L — LOW (ref 3.5–5)
POTASSIUM SERPL-SCNC: 3 MMOL/L — LOW (ref 3.5–5)
POTASSIUM SERPL-SCNC: 3.3 MMOL/L — LOW (ref 3.5–5)
PROT SERPL-MCNC: 7.5 G/DL — SIGNIFICANT CHANGE UP (ref 6–8)
PROT UR-MCNC: 30 MG/DL
PROTHROM AB SERPL-ACNC: 10.7 SEC — SIGNIFICANT CHANGE UP (ref 9.95–12.87)
RBC # BLD: 3.55 M/UL — LOW (ref 4.2–5.4)
RBC # FLD: 13.6 % — SIGNIFICANT CHANGE UP (ref 11.5–14.5)
RBC CASTS # UR COMP ASSIST: 1 /HPF — SIGNIFICANT CHANGE UP (ref 0–4)
SODIUM SERPL-SCNC: 117 MMOL/L — CRITICAL LOW (ref 135–146)
SODIUM SERPL-SCNC: 119 MMOL/L — CRITICAL LOW (ref 135–146)
SP GR SPEC: 1.01 — SIGNIFICANT CHANGE UP (ref 1–1.03)
SQUAMOUS # UR AUTO: 0 /HPF — SIGNIFICANT CHANGE UP (ref 0–5)
UROBILINOGEN FLD QL: 0.2 MG/DL — SIGNIFICANT CHANGE UP (ref 0.2–1)
WBC # BLD: 8.27 K/UL — SIGNIFICANT CHANGE UP (ref 4.8–10.8)
WBC # FLD AUTO: 8.27 K/UL — SIGNIFICANT CHANGE UP (ref 4.8–10.8)
WBC UR QL: 217 /HPF — HIGH (ref 0–5)

## 2024-06-30 PROCEDURE — 36415 COLL VENOUS BLD VENIPUNCTURE: CPT

## 2024-06-30 PROCEDURE — 84540 ASSAY OF URINE/UREA-N: CPT

## 2024-06-30 PROCEDURE — 86850 RBC ANTIBODY SCREEN: CPT

## 2024-06-30 PROCEDURE — 83935 ASSAY OF URINE OSMOLALITY: CPT

## 2024-06-30 PROCEDURE — 97110 THERAPEUTIC EXERCISES: CPT | Mod: GP

## 2024-06-30 PROCEDURE — 97116 GAIT TRAINING THERAPY: CPT | Mod: GP

## 2024-06-30 PROCEDURE — 97530 THERAPEUTIC ACTIVITIES: CPT | Mod: GP

## 2024-06-30 PROCEDURE — 81001 URINALYSIS AUTO W/SCOPE: CPT

## 2024-06-30 PROCEDURE — 84295 ASSAY OF SERUM SODIUM: CPT

## 2024-06-30 PROCEDURE — 82533 TOTAL CORTISOL: CPT

## 2024-06-30 PROCEDURE — 84133 ASSAY OF URINE POTASSIUM: CPT

## 2024-06-30 PROCEDURE — 84100 ASSAY OF PHOSPHORUS: CPT

## 2024-06-30 PROCEDURE — 86900 BLOOD TYPING SEROLOGIC ABO: CPT

## 2024-06-30 PROCEDURE — 99285 EMERGENCY DEPT VISIT HI MDM: CPT

## 2024-06-30 PROCEDURE — 82570 ASSAY OF URINE CREATININE: CPT

## 2024-06-30 PROCEDURE — 84443 ASSAY THYROID STIM HORMONE: CPT

## 2024-06-30 PROCEDURE — 83605 ASSAY OF LACTIC ACID: CPT

## 2024-06-30 PROCEDURE — 82803 BLOOD GASES ANY COMBINATION: CPT

## 2024-06-30 PROCEDURE — 84132 ASSAY OF SERUM POTASSIUM: CPT

## 2024-06-30 PROCEDURE — 80053 COMPREHEN METABOLIC PANEL: CPT

## 2024-06-30 PROCEDURE — 84156 ASSAY OF PROTEIN URINE: CPT

## 2024-06-30 PROCEDURE — 76770 US EXAM ABDO BACK WALL COMP: CPT

## 2024-06-30 PROCEDURE — 93308 TTE F-UP OR LMTD: CPT

## 2024-06-30 PROCEDURE — 84449 ASSAY OF TRANSCORTIN: CPT

## 2024-06-30 PROCEDURE — 71045 X-RAY EXAM CHEST 1 VIEW: CPT

## 2024-06-30 PROCEDURE — 84145 PROCALCITONIN (PCT): CPT

## 2024-06-30 PROCEDURE — 82330 ASSAY OF CALCIUM: CPT

## 2024-06-30 PROCEDURE — 83735 ASSAY OF MAGNESIUM: CPT

## 2024-06-30 PROCEDURE — 83036 HEMOGLOBIN GLYCOSYLATED A1C: CPT

## 2024-06-30 PROCEDURE — 92610 EVALUATE SWALLOWING FUNCTION: CPT | Mod: GN

## 2024-06-30 PROCEDURE — 85025 COMPLETE CBC W/AUTO DIFF WBC: CPT

## 2024-06-30 PROCEDURE — 83880 ASSAY OF NATRIURETIC PEPTIDE: CPT

## 2024-06-30 PROCEDURE — 83550 IRON BINDING TEST: CPT

## 2024-06-30 PROCEDURE — 71045 X-RAY EXAM CHEST 1 VIEW: CPT | Mod: 26

## 2024-06-30 PROCEDURE — 99223 1ST HOSP IP/OBS HIGH 75: CPT

## 2024-06-30 PROCEDURE — 85027 COMPLETE CBC AUTOMATED: CPT

## 2024-06-30 PROCEDURE — 97162 PT EVAL MOD COMPLEX 30 MIN: CPT | Mod: GP

## 2024-06-30 PROCEDURE — 84300 ASSAY OF URINE SODIUM: CPT

## 2024-06-30 PROCEDURE — 83930 ASSAY OF BLOOD OSMOLALITY: CPT

## 2024-06-30 PROCEDURE — 86901 BLOOD TYPING SEROLOGIC RH(D): CPT

## 2024-06-30 PROCEDURE — 94760 N-INVAS EAR/PLS OXIMETRY 1: CPT

## 2024-06-30 PROCEDURE — 83540 ASSAY OF IRON: CPT

## 2024-06-30 PROCEDURE — 82728 ASSAY OF FERRITIN: CPT

## 2024-06-30 PROCEDURE — 84484 ASSAY OF TROPONIN QUANT: CPT

## 2024-06-30 PROCEDURE — 82962 GLUCOSE BLOOD TEST: CPT

## 2024-06-30 PROCEDURE — 80048 BASIC METABOLIC PNL TOTAL CA: CPT

## 2024-06-30 RX ORDER — METOPROLOL TARTRATE 50 MG
50 TABLET ORAL DAILY
Refills: 0 | Status: DISCONTINUED | OUTPATIENT
Start: 2024-06-30 | End: 2024-07-01

## 2024-06-30 RX ORDER — CEFTRIAXONE SODIUM 500 MG
1000 VIAL (EA) INJECTION ONCE
Refills: 0 | Status: COMPLETED | OUTPATIENT
Start: 2024-06-30 | End: 2024-06-30

## 2024-06-30 RX ORDER — MAGNESIUM SULFATE 100 %
2 POWDER (GRAM) MISCELLANEOUS
Refills: 0 | Status: COMPLETED | OUTPATIENT
Start: 2024-06-30 | End: 2024-06-30

## 2024-06-30 RX ORDER — DEXTROSE 30 % IN WATER 30 %
12.5 VIAL (ML) INTRAVENOUS ONCE
Refills: 0 | Status: DISCONTINUED | OUTPATIENT
Start: 2024-06-30 | End: 2024-07-05

## 2024-06-30 RX ORDER — POTASSIUM CHLORIDE 600 MG/1
20 TABLET, FILM COATED, EXTENDED RELEASE ORAL ONCE
Refills: 0 | Status: COMPLETED | OUTPATIENT
Start: 2024-06-30 | End: 2024-06-30

## 2024-06-30 RX ORDER — DEXTROSE MONOHYDRATE 100 MG/ML
125 INJECTION, SOLUTION INTRAVENOUS ONCE
Refills: 0 | Status: DISCONTINUED | OUTPATIENT
Start: 2024-06-30 | End: 2024-07-05

## 2024-06-30 RX ORDER — HEPARIN SODIUM 50 [USP'U]/ML
5000 INJECTION, SOLUTION INTRAVENOUS EVERY 8 HOURS
Refills: 0 | Status: DISCONTINUED | OUTPATIENT
Start: 2024-06-30 | End: 2024-07-11

## 2024-06-30 RX ORDER — ENOXAPARIN SODIUM 100 MG/ML
40 INJECTION SUBCUTANEOUS EVERY 24 HOURS
Refills: 0 | Status: DISCONTINUED | OUTPATIENT
Start: 2024-06-30 | End: 2024-06-30

## 2024-06-30 RX ORDER — CLOPIDOGREL BISULFATE 75 MG/1
75 TABLET, FILM COATED ORAL DAILY
Refills: 0 | Status: DISCONTINUED | OUTPATIENT
Start: 2024-06-30 | End: 2024-07-11

## 2024-06-30 RX ORDER — DAPAGLIFLOZIN 10 MG/1
10 TABLET, FILM COATED ORAL DAILY
Refills: 0 | Status: DISCONTINUED | OUTPATIENT
Start: 2024-06-30 | End: 2024-07-01

## 2024-06-30 RX ORDER — SODIUM CHLORIDE 0.9 % (FLUSH) 0.9 %
500 SYRINGE (ML) INJECTION ONCE
Refills: 0 | Status: COMPLETED | OUTPATIENT
Start: 2024-06-30 | End: 2024-06-30

## 2024-06-30 RX ORDER — ATORVASTATIN CALCIUM 20 MG/1
80 TABLET, FILM COATED ORAL AT BEDTIME
Refills: 0 | Status: DISCONTINUED | OUTPATIENT
Start: 2024-06-30 | End: 2024-07-11

## 2024-06-30 RX ORDER — DEXTROSE MONOHYDRATE AND SODIUM CHLORIDE 5; .3 G/100ML; G/100ML
1000 INJECTION, SOLUTION INTRAVENOUS ONCE
Refills: 0 | Status: COMPLETED | OUTPATIENT
Start: 2024-06-30 | End: 2024-06-30

## 2024-06-30 RX ORDER — INSULIN LISPRO 100 [IU]/ML
INJECTION, SOLUTION SUBCUTANEOUS AT BEDTIME
Refills: 0 | Status: DISCONTINUED | OUTPATIENT
Start: 2024-06-30 | End: 2024-07-05

## 2024-06-30 RX ORDER — POTASSIUM CHLORIDE 600 MG/1
40 TABLET, FILM COATED, EXTENDED RELEASE ORAL EVERY 4 HOURS
Refills: 0 | Status: COMPLETED | OUTPATIENT
Start: 2024-06-30 | End: 2024-07-01

## 2024-06-30 RX ORDER — INSULIN LISPRO 100 [IU]/ML
INJECTION, SOLUTION SUBCUTANEOUS
Refills: 0 | Status: DISCONTINUED | OUTPATIENT
Start: 2024-06-30 | End: 2024-07-05

## 2024-06-30 RX ORDER — LEVOTHYROXINE SODIUM 25 MCG
25 TABLET ORAL DAILY
Refills: 0 | Status: DISCONTINUED | OUTPATIENT
Start: 2024-06-30 | End: 2024-07-11

## 2024-06-30 RX ORDER — DEXTROSE MONOHYDRATE AND SODIUM CHLORIDE 5; .3 G/100ML; G/100ML
1000 INJECTION, SOLUTION INTRAVENOUS
Refills: 0 | Status: DISCONTINUED | OUTPATIENT
Start: 2024-06-30 | End: 2024-07-05

## 2024-06-30 RX ORDER — ACETAMINOPHEN 325 MG
650 TABLET ORAL EVERY 6 HOURS
Refills: 0 | Status: DISCONTINUED | OUTPATIENT
Start: 2024-06-30 | End: 2024-07-05

## 2024-06-30 RX ORDER — DEXTROSE 30 % IN WATER 30 %
15 VIAL (ML) INTRAVENOUS ONCE
Refills: 0 | Status: DISCONTINUED | OUTPATIENT
Start: 2024-06-30 | End: 2024-07-05

## 2024-06-30 RX ORDER — DEXTROSE 30 % IN WATER 30 %
25 VIAL (ML) INTRAVENOUS ONCE
Refills: 0 | Status: DISCONTINUED | OUTPATIENT
Start: 2024-06-30 | End: 2024-07-05

## 2024-06-30 RX ORDER — GLUCAGON HYDROCHLORIDE 1 MG/ML
1 INJECTION, POWDER, FOR SOLUTION INTRAMUSCULAR; INTRAVENOUS; SUBCUTANEOUS ONCE
Refills: 0 | Status: DISCONTINUED | OUTPATIENT
Start: 2024-06-30 | End: 2024-07-05

## 2024-06-30 RX ADMIN — Medication 650 MILLIGRAM(S): at 21:18

## 2024-06-30 RX ADMIN — DEXTROSE MONOHYDRATE AND SODIUM CHLORIDE 1000 MILLILITER(S): 5; .3 INJECTION, SOLUTION INTRAVENOUS at 12:50

## 2024-06-30 RX ADMIN — Medication 100 MILLIGRAM(S): at 17:22

## 2024-06-30 RX ADMIN — HEPARIN SODIUM 5000 UNIT(S): 50 INJECTION, SOLUTION INTRAVENOUS at 21:23

## 2024-06-30 RX ADMIN — Medication 125 MILLILITER(S): at 21:23

## 2024-06-30 RX ADMIN — ATORVASTATIN CALCIUM 80 MILLIGRAM(S): 20 TABLET, FILM COATED ORAL at 21:18

## 2024-06-30 RX ADMIN — POTASSIUM CHLORIDE 40 MILLIEQUIVALENT(S): 600 TABLET, FILM COATED, EXTENDED RELEASE ORAL at 21:17

## 2024-06-30 RX ADMIN — DEXTROSE MONOHYDRATE AND SODIUM CHLORIDE 1000 MILLILITER(S): 5; .3 INJECTION, SOLUTION INTRAVENOUS at 11:58

## 2024-06-30 RX ADMIN — Medication 25 GRAM(S): at 23:12

## 2024-06-30 RX ADMIN — POTASSIUM CHLORIDE 20 MILLIEQUIVALENT(S): 600 TABLET, FILM COATED, EXTENDED RELEASE ORAL at 17:22

## 2024-06-30 RX ADMIN — Medication 500 MILLILITER(S): at 14:12

## 2024-06-30 RX ADMIN — Medication 25 GRAM(S): at 21:17

## 2024-06-30 NOTE — H&P ADULT - ATTENDING COMMENTS
HPI:  58 y/o woman w PMHx of HTN, HLD, DM, CAD s/p stent, HFrEF 25%, prior ICD s/p removal due to infection, severe pulm HTN, follows w Cardio NP Cyn Manzano, COPD on 4L NC, CORNELIUS no CPAP, CKD baseline Cr 1.3, CVA w residual L sided weakness and baseline bedbound vs wheelchair reliant, hypothyroidism, presented to ED for 1 week of malaise/fatigue, decreased PO intake and insomnia, in ED found to have WBC 8.27, Na 117, K 3.3, Cr 2.3, lactate 1.2, , given 500cc NS, CXR unremarkable, admitted to medicine stepdown 6/30/24 for hyponatremia of 117 and JEMIMA.  HCP brother Pérez Ramos whose phone number she cannot recall     Pt is a very brief historian - mostly asked for food and blanket during our conversation.   States she lives at home w HHA helping w food and other home needs, as pt is bed/wheelchair reliant. States over past week she was not eating because she couldn't sleep. When asked how those two things are related, patient said "I don't know" and when asked if she had appetite, then said no. Unable to find any reason pt suddenly had insomnia; denies any new meds, new supplements, changes in medication doses.   ROS: Denies any new symptoms aside from fatigue/malaise, insomnia, and loss of appetite. Denies any CP, SOB, changes in urination, n/v/d, abd pain, leg swelling. No recent travel or sick contacts. No fevers, chills.     Triage vitals were: 92/65, 74bpm, RR20, 100% on RA, 98.4  Repeat vitals notable for: 102/60, 67bpm, RR18, 98% on 4L   Labs notable for: WBC 8.27, Na 117, K 3.3, Cr 2.3, lactate 1.2, , UA(+)  Imaging: CXR unremarkable  Treatments: given 500cc NS  Pending: BNP, repeat BMP (30 Jun 2024 18:54)    REVIEW OF SYSTEMS: see cc/HPI   CONSTITUTIONAL: No weakness, fevers or chills  EYES/ENT: No visual changes;  No vertigo or throat pain   NECK: No pain or stiffness  RESPIRATORY: No cough, wheezing, hemoptysis; No shortness of breath  CARDIOVASCULAR: No chest pain or palpitations  GASTROINTESTINAL: No abdominal or epigastric pain. No nausea, vomiting, or hematemesis; No diarrhea or constipation. No melena or hematochezia.  GENITOURINARY: No dysuria, frequency or hematuria  NEUROLOGICAL: No numbness or weakness  SKIN: No itching, rashes  ENDO: No hyperglycemia, No thyroid disorder, No dyslipidemia   HEM: No bleeding, No easy bruising, No anemia   PSYCHE: No psychosis, No mood disorder No hallucinations No delusion   MSK: No deformity, No fracture, No Joint swelling    Physical Exam:  General: WN/WD NAD  Neurology: A&Ox3, nonfocal, follows commands  Eyes: PERRLA/ EOMI  ENT/Neck: Neck supple, trachea midline, No JVD  Respiratory: CTA B/L, No wheezing, rales, rhonchi  CV: Normal rate regular rhythm, S1S2, no murmurs, rubs or gallops  Abdominal: Soft, NT, ND +BS,   Extremities: No edema, + peripheral pulses  Skin: No Rashes, Hematoma, Ecchymosis    A/p  Electrolyte Abnormality HPI:  60 y/o woman w PMHx of HTN, HLD, DM, CAD s/p stent, HFrEF 25%, prior ICD s/p removal due to infection, severe pulm HTN, follows w Cardio NP Cyn Manzano, COPD on 4L NC, CORNELIUS no CPAP, CKD baseline Cr 1.3, CVA w residual L sided weakness and baseline bedbound vs wheelchair reliant, hypothyroidism, presented to ED for 1 week of malaise/fatigue, decreased PO intake and insomnia, in ED found to have WBC 8.27, Na 117, K 3.3, Cr 2.3, lactate 1.2, , given 500cc NS, CXR unremarkable, admitted to medicine stepdown 6/30/24 for hyponatremia of 117 and JEMIMA.  HCP brother Pérez Ramos whose phone number she cannot recall     Pt is a very brief historian - mostly asked for food and blanket during our conversation.   States she lives at home w HHA helping w food and other home needs, as pt is bed/wheelchair reliant. States over past week she was not eating because she couldn't sleep. When asked how those two things are related, patient said "I don't know" and when asked if she had appetite, then said no. Unable to find any reason pt suddenly had insomnia; denies any new meds, new supplements, changes in medication doses.   ROS: Denies any new symptoms aside from fatigue/malaise, insomnia, and loss of appetite. Denies any CP, SOB, changes in urination, n/v/d, abd pain, leg swelling. No recent travel or sick contacts. No fevers, chills.     Triage vitals were: 92/65, 74bpm, RR20, 100% on RA, 98.4  Repeat vitals notable for: 102/60, 67bpm, RR18, 98% on 4L   Labs notable for: WBC 8.27, Na 117, K 3.3, Cr 2.3, lactate 1.2, , UA(+)  Imaging: CXR unremarkable  Treatments: given 500cc NS  Pending: BNP, repeat BMP (30 Jun 2024 18:54)    REVIEW OF SYSTEMS: see cc/HPI   CONSTITUTIONAL: No weakness, fevers or chills  EYES/ENT: No visual changes;  No vertigo or throat pain   NECK: No pain or stiffness  RESPIRATORY: No cough, wheezing, hemoptysis; No shortness of breath  CARDIOVASCULAR: No chest pain or palpitations  GASTROINTESTINAL: No abdominal or epigastric pain. No nausea, vomiting, or hematemesis; No diarrhea or constipation. No melena or hematochezia.  GENITOURINARY: No dysuria, frequency or hematuria  NEUROLOGICAL: No numbness (+) L sided weakness s/p CVA  SKIN: No itching, rashes  ENDO: No hyperglycemia, No thyroid disorder, No dyslipidemia   HEM: No bleeding, No easy bruising, No anemia   PSYCHE: No psychosis, No mood disorder No hallucinations No delusion   MSK: No deformity, No fracture, No Joint swelling    Physical Exam:  General: WN/WD NAD  Neurology: A&Ox3, (+) L nicky and follows commands  Eyes: PERRLA/ EOMI  ENT/Neck: Neck supple, trachea midline, No JVD  Respiratory: CTA B/L, No wheezing, rales, rhonchi  CV: Normal rate regular rhythm, S1S2, no murmurs, rubs or gallops  Abdominal: Soft, NT, ND +BS,   Extremities: No edema, + peripheral pulses  Skin: No Rashes, Hematoma, Ecchymosis    A/p  Electrolyte Abnormalities w/ JEMIMA on CKD  Moderately severe hyponatremia - poor oral intake (x 10 days)  Hypokalemia   -admit to SDU with tele monitoring   -supplement K +  -hold diuretic and Entresto  -IV saline in ED   -check urine lytes /cr, serum and urine osm  -check TSH   -serial BMP     UTI   -IV Abx   -check UCx and Blood Cx     Suspected mood disorder - r/o depression ( loss of appetite / interest in thing /people )  H/o CVA / Decreased appetite   -check TSH, Vit B12, RPR   -Psyche eval   -check swallow eval     H/o CAD   H/o HFrEF   severe PulmHTN   -IV saline for now   -hold diuretics and Entresto for now   -c/w Dapagliflozin and BBlocker     Chronic resp failure w/ hypoxia /COPD on O2   CORNELIUS     DM type II   -F/s monitoring and ISS    Hypothyroidism  -check TSH     DVT prophylaxis     PATIENT SEEN by ATTENDING 6/30/24

## 2024-06-30 NOTE — ED PROVIDER NOTE - ATTENDING CONTRIBUTION TO CARE
59-year-old female, PMH CAD, CKD, HTN, HLD, DM, COPD on 4 L nasal cannula, CORNELIUS, bedbound, CHF, presents with decreased p.o. x 10 days.  No fever, vomiting, diarrhea or abdominal pain.  Patient simply does not feel like eating.      PE: Tired appearing: NAD; HEAD: NCAT; ENT: Dry MMM; NECK: supple; CARDIO: RRR; PULM: No resp distress; ABD: s/nt; MSK: no pedal edema; NEURO: grossly non focal; PSYCHE: cooperative.      IMP: Generalized weakness and decreased p.o., consider electrolyte abnormality, dehydration, malnutrition, pneumonia, UTI.  Plan, labs, IVF, chest x-ray, UA, reassess

## 2024-06-30 NOTE — ED PROVIDER NOTE - OBJECTIVE STATEMENT
59-year-old female with past medical history of HFrEF, CAD, CKD, HTN, HLD, DM, CVA with residual left lower extremity weakness, bedbound, CORNELIUS, COPD on 4 L nasal cannula at baseline, hypothyroidism, presents to the ED complaining of decreased p.o. intake for several days and generalized weakness since yesterday.  Patient states she has not had an appetite and has not been eating well for the last several days.  Patient denies any recent fever, headache, difficulty breathing, chest pain, nausea, vomiting, or diarrhea.

## 2024-06-30 NOTE — H&P ADULT - NSHPPHYSICALEXAM_GEN_ALL_CORE
GENERAL: NAD, lying in bed comfortably. Coughing occasionally w eating.   HEAD:  Atraumatic, Normocephalic  EYES: EOMI, sclera clear  ENT: Moist mucous membranes  NECK: Supple, trachea midline  CHEST/LUNG: Clear to auscultation anteriorly bilaterally; No rales, rhonchi, wheezing, or rubs. Unlabored respirations  HEART: Regular rate and rhythm; No appreciable murmurs, rubs, or gallops  ABDOMEN: (+)BS; Soft, nontender, nondistended  EXTREMITIES:  2+ Radial Pulses, brisk capillary refill. No clubbing, cyanosis, or edema  NERVOUS SYSTEM:  A&Ox3, no noted facial droop. Moving all extremities w/o difficulty.   SKIN: No rashes or lesions to b/l forearm, face.

## 2024-06-30 NOTE — ED ADULT NURSE REASSESSMENT NOTE - NS ED NURSE REASSESS COMMENT FT1
OT IRP Treatment      Primary Rehabilitation Diagnosis: bilateral knee replaceement  Expected Discharge Date: 02/16/18  Planned Discharge Destination: Home    ASSESSMENT:   Treatment today focused on standing self cares, theraband exercises to review his HEP, lite gait to focus on standing activities at the table, arm bike and improving knee AROM.  Patient is demonstrating good progress supported by his Modified Independent level with self cares. Patient states he feels comfortable about going home.    Patient limited at this time by knee ROM.  Patient will benefit from further skilled OT  for continued training with HEP, standing tasks in the kitchen, strengthening and endurance to help the patient meet their goals of home with OP therapy .    SUBJECTIVE: Subjective: \" I am game for whatever.\" (02/15/18 1315)    OBJECTIVE:  Precautions  Weight Bearing Status: Weight bearing as tolerated right lower extremity;Weight bearing as tolerated left lower extremity (02/15/18 1315)    See below for current functional status overview.  See OT flowsheet for full details regarding the OT therapy provided.      OT Identified Barriers to Discharge: lives alone, balance, transfers, mobility      EDUCATION:   On this date, the patient was educated on HEP and purpose of Lite Gait .    The response to education was: Verbalizes understanding    PLAN:   Continue skilled OT, including the following Treatment Interventions: ADL retraining;Functional transfer training;UE strengthening/ROM;Endurance training;Compensatory technique education;Patient/Family training;Equipment eval/education (02/15/18 1315)   OT Frequency: 7 days/week (02/15/18 1315), Frequency Comments: DAILY (02/15/18 1315)    Treatment Plan for Next Session: shower             GOALS:  Short Term Goals to Be Reviewed On: 02/22/18  Goal Agreement: Patient agrees with goals and treatment plan           Grooming Short Term Goal 1: Grooming standing sinkside with supv  Grooming  Pt continuously yells for help from room despite call bell within reach, functioning and pt education ongoing on use of call bell.   Pt yelling for turning side to side every 10-15 minutes and wants staff to "scratch my butt for me"   PCAs and myself in and out of pt room multiple times in attempt to provide comfort  Charge nurse made aware and went to see pt as well Discharge Goal 1: grooming standing sinkside with modified indep  Grooming Discharge Goal Progress 1: Outcome met, complete goal  Bathing Short Term Goal 1: supv with sponge bathing sinkside  Bathing Discharge Goal 1: modified indep sponge bathing sinkside  Bathing Discharge Goal Progress 1: Outcome met, complete goal     Bathing Discharge Goal 2: supervision for bathing seated in shower  Bathing Discharge Goal Progress 2: Outcome not met, continue to monitor  Dressing Short Term Goal 1: LB dressing with min assist  Dressing Discharge Goal 1: LB dressing with modified indep using AE  Dressing Discharge Goal Progress 1: Outcome not met, continue to monitor           Toileting Short Term Goal 1: supervision  Toileting Discharge Goal Progress 1: Outcome met, complete goal  Home Setting Transfer Short Term Goal 1: toilet transfer with supv  Home Setting Transfer Discharge Goal 1: toilet transfer with modified indep  Home Setting Transfer Discharge Goal Progress 1: Outcome met, complete goal           UE Function Discharge Goal 1: BUE HEP x2/day with no verbal cues for technique      RECOMMENDATIONS FOR DISCHARGE:  Recommendations for Discharge: OT: Home    PT/OT Mobility Equipment for Discharge: No new needs anticipated, has a 2WW (02/15/18 1315)  PT/OT ADL Equipment for Discharge: Has hip kit (02/15/18 1315)      FUNCTIONAL DATA OVERVIEW LAST 24 HOURS  ADLs   Self Cares/ADL's  Grooming Assistance: Modified independent;Standing at sink (02/15/18 0800)  Bathing Assistance: Modified independent;Standing at sink (02/15/18 0800)  Upper Body Dressing Assistance: Modified independent;Chair (02/15/18 0800)  Lower Body Clothing Assistance: Supervision;Chair (02/15/18 0800)  Footwear Assistance: Supervision;Chair (02/15/18 0800)  Lower Body Dressing Deficit: Use of adaptive equipment;Verbal cueing (02/15/18 0800)  Dressing Equipment Used: Reacher;Wide sock aid;Long handled shoehorn (02/15/18 0800)    Household  mobility  Household Mobility  Supine to Sit: Modified Independent (02/15/18 1315)  Sit to Supine: Modified Independent (02/15/18 1315)  Bed to Chair: Modified Independent (02/15/18 0800)  Sit to Stand: Modified Independent (02/15/18 1315)  Stand to Sit: Modified Independent (02/15/18 1315)  Stand Pivot Transfers: Modified Independent (02/15/18 1315)  Transfer Equipment: 2ww (02/15/18 1315)  Sitting - Static: Independent (02/15/18 1315)  Sitting - Dynamic: Modified Independent (02/15/18 1315)  Standing - Static: Modified Independent (02/15/18 1315)  Standing - Dynamic: Modified Independent (02/15/18 1315)    Home Management       Tolerance  OT Activity Tolerance  Activity Tolerance: 1:1 Activity to rest (02/15/18 1315)  Activity Tolerance Comments: good  (02/15/18 1315)    Cognition

## 2024-06-30 NOTE — ED PROVIDER NOTE - PROGRESS NOTE DETAILS
AE: 1 L LR initially ordered, however not given to patient considering patient's history of heart failure.  Laboratory called to inform of sodium of 117.  Will give 500 mL of normal saline.  Will discuss patient with ICU fellow for evaluation of stepdown unit.

## 2024-06-30 NOTE — H&P ADULT - CONVERSATION DETAILS
Explained to pt that unless otherwise stated, in a hospital everyone is treated as full code, meaning if needed in an emergency, compressions are done when a pt's heart does not beat on its own and intubation and ventilation are done when a pt cannot breathe on their own. Asked whether this is something that pt would want if ever required as a life sustaining measure.   Asked to be full code.     Asked whether pt has anyone who they would want to make their healthcare decisions for them if they were too confused, not awake, or otherwise unable to do so.   HCP brother Pérez Ramos whose phone number she cannot recall

## 2024-06-30 NOTE — ED PROVIDER NOTE - CLINICAL SUMMARY MEDICAL DECISION MAKING FREE TEXT BOX
.    Patient with generalized weakness.  Exam as noted in chart.  Additional information taken from chart review.  Differential diagnosis as noted.  All available lab tests, imaging tests, and EKGs independently reviewed and interpreted by Eligio smith..  Labs notable for hyponatremia, JEMIMA/prerenal azotemia, hypokalemia.  UA + UTI.  Chest x-ray shows no focal consolidation, free air, or pneumothorax.  EKG sinus rhythm, no STEMI.  Patient given IVF, broad-spectrum antibiotics.  Case discussed with critical care.  IMP: Hyponatremia, generalized weakness, UTI, JEMIMA, hyper-K.  Patient admitted to stepdown unit for further management, electrolyte repletion, broad-spectrum antibiotics, specialist consultation.

## 2024-06-30 NOTE — H&P ADULT - ASSESSMENT
58 y/o woman w PMHx of HTN, HLD, DM, CAD s/p stent, HFrEF 25%, prior ICD s/p removal due to infection, severe pulm HTN, follows w Cardio NP Cyn Manzano, COPD on 4L NC, CORNELIUS no CPAP, CKD baseline Cr 1.3, CVA w residual L sided weakness and baseline bedbound vs wheelchair reliant, hypothyroidism, presented to ED for 1 week of malaise/fatigue, decreased PO intake and insomnia, in ED found to have WBC 8.27, Na 117, K 3.3, Cr 2.3, lactate 1.2, , given 500cc NS, CXR unremarkable, admitted to medicine stepdown 6/30/24 for hyponatremia of 117 and JEMIMA.  HCP brother Pérez Ramos whose phone number she cannot recall       Triage vitals were: 92/65, 74bpm, RR20, 100% on RA, 98.4  Repeat vitals notable for: 102/60, 67bpm, RR18, 98% on 4L   Labs notable for: WBC 8.27, Na 117, K 3.3, Cr 2.3, lactate 1.2,   Imaging: CXR unremarkable  Treatments: given 500cc NS  Pending: BNP, repeat BMP    #Hyponatremia  #Hypokalemia   #Loss of appetite   #CKD   DDx most consistent w hypovolemic hyponatremia 2/2 poor PO intake, though unclear why pt has poor PO intake   -                                                                                   ----------------------------------------------------  # DVT prophylaxis:   # GI prophylaxis:   # Diet:   # Activity:   # Code status:   # Disposition:                                                                            --------------------------------------------------------    # Handoff:  60 y/o woman w PMHx of HTN, HLD, DM, CAD s/p stent, HFrEF 25%, prior ICD s/p removal due to infection, severe pulm HTN, follows w Cardio NP Cyn Manzano, COPD on 4L NC, CORNELIUS no CPAP, CKD baseline Cr 1.3, CVA w residual L sided weakness and baseline bedbound vs wheelchair reliant, hypothyroidism, presented to ED for 1 week of malaise/fatigue, decreased PO intake and insomnia, in ED found to have WBC 8.27, Na 117, K 3.3, Cr 2.3, lactate 1.2, , given 500cc NS, CXR unremarkable, admitted to medicine stepdown 6/30/24 for hyponatremia of 117 and JEMIMA.  HCP brother Pérez Ramos whose phone number she cannot recall     *** Med rec incomplete - done using 1/2024 Cardio visit note.   pt ?lost her list, did not have phone number of family, [place]  Call [facility] ([facility] location) [phone] or ([place] location) [phone] to confirm     #Hyponatremia  #Hypokalemia   #Loss of appetite   #CKD   DDx most consistent w hypovolemic hyponatremia 2/2 poor PO intake, though unclear why pt has poor PO intake. No SIRS criteria to suggest infectious etiology. Consider hypothyroidism.   - s/p 500cc in ED w repeat BMP Na 119  - give another 500cc over 4 hours  - s/p 20meq potassium repletion; given JEMIMA on CKD, will be careful. Goal ~3.6  - Spironolactone held, Entresto held in setting of JEMIMA   - Pending BCx  - Pending RBUS  - Urine lytes, serum osm   - Cardio consult to guide fluid repletion   - TSH     #CAD s/p stent  #HFrEF 25%  #prior ICD s/p removal due to infection  #severe pulm HTN  follows w Cardio NP Cyn Manzano  Home meds include: Entresto, Spironolactone, Dapagliflozin, Metoprolol  - Torsemide 20mg BID held   - Entresto and spironolactone held  - Dapagliflozin and metoprolol continued   - Cardio consulted   - Fluid restrict, daily weight, strict I/Os     #COPD on 4L NC - on supplemental O2 here, no wheezing   #CORNELIUS no CPAP - pt denies CPAP but outpatient notes document CPAP (?)   #HTN - meds held as noted above   #HLD - continue atorvastatin 80mg qhs   #DM - +1 SS; on dapagliflozin at home, no other DM meds  #Hypothyroidism - continue levothyroxine 50mcg qd                                                                                ----------------------------------------------------  # DVT prophylaxis: Heparin 5000 q8h   # GI prophylaxis: n/a   # Diet: Regular DASH TLC CC +S&S consult   # Activity: Increase as tolerated +PT consult  # Code status: FULL CODE   # Disposition: SDU                                                                            --------------------------------------------------------     60 y/o woman w PMHx of HTN, HLD, DM, CAD s/p stent, HFrEF 25%, prior ICD s/p removal due to infection, severe pulm HTN, follows w Cardio NP Cyn Manzano, COPD on 4L NC, CORNELIUS no CPAP, CKD baseline Cr 1.3, CVA w residual L sided weakness and baseline bedbound vs wheelchair reliant, hypothyroidism, presented to ED for 1 week of malaise/fatigue, decreased PO intake and insomnia, in ED found to have WBC 8.27, Na 117, K 3.3, Cr 2.3, lactate 1.2, , given 500cc NS, CXR unremarkable, admitted to medicine stepdown 6/30/24 for hyponatremia of 117 and JEMIMA.  HCP brother Pérez Ramos whose phone number she cannot recall     *** Med rec incomplete - done using 1/2024 Cardio visit note.   pt ?lost her list, did not have phone number of family, not in [email or web address]  Call [facility] ([facility] location) [phone] or ([place] location) [phone] to confirm     #Hyponatremia  #Hypokalemia   #Loss of appetite   #CKD   DDx most consistent w hypovolemic hyponatremia 2/2 poor PO intake, though unclear why pt has poor PO intake. No SIRS criteria to suggest infectious etiology. Consider hypothyroidism.   - s/p 500cc in ED w repeat BMP Na 119  - give another 500cc over 4 hours  - s/p 20meq potassium repletion; given JEMIMA on CKD, will be careful. Goal ~3.6  - Spironolactone held, Entresto held in setting of JEMIMA   - Pending BCx  - Pending RBUS  - Urine lytes, serum osm   - Cardio consult to guide fluid repletion   - TSH     #(+)UA   - Given ctx x1 dose in ED   - asymptomatic, monitor off abx     #CAD s/p stent  #HFrEF 25%  #prior ICD s/p removal due to infection  #severe pulm HTN  follows w Cardio NP Cyn Manzano  Home meds include: Entresto, Spironolactone, Dapagliflozin, Metoprolol  - Torsemide 20mg BID held   - Entresto and spironolactone held  - Dapagliflozin and metoprolol continued   - Cardio consulted   - Fluid restrict, daily weight, strict I/Os     #COPD on 4L NC - on supplemental O2 here, no wheezing   #CORNELIUS no CPAP - pt denies CPAP but outpatient notes document CPAP (?)   #HTN - meds held as noted above   #HLD - continue atorvastatin 80mg qhs   #DM - +1 SS; on dapagliflozin at home, no other DM meds  #Hypothyroidism - continue levothyroxine 50mcg qd                                                                                ----------------------------------------------------  # DVT prophylaxis: Heparin 5000 q8h   # GI prophylaxis: n/a   # Diet: Regular DASH TLC CC +S&S consult   # Activity: Increase as tolerated +PT consult  # Code status: FULL CODE   # Disposition: SDU                                                                            --------------------------------------------------------

## 2024-06-30 NOTE — H&P ADULT - HISTORY OF PRESENT ILLNESS
60 y/o woman w PMHx of HTN, HLD, DM, CAD s/p stent, HFrEF 25%, prior ICD s/p removal due to infection, severe pulm HTN, follows w Cardio NP Cyn Manzano, COPD on 4L NC, CORNELIUS no CPAP, CKD baseline Cr 1.3, CVA w residual L sided weakness and baseline bedbound vs wheelchair reliant, hypothyroidism, presented to ED for 1 week of malaise/fatigue, decreased PO intake and insomnia, in ED found to have WBC 8.27, Na 117, K 3.3, Cr 2.3, lactate 1.2, , given 500cc NS, CXR unremarkable, admitted to medicine stepdown 6/30/24 for hyponatremia of 117 and JEMIMA.  HCP brother Pérez Ramos whose phone number she cannot recall     Pt is a very brief historian - mostly asked for food and blanket during our conversation.   States she lives at home w HHA helping w food and other home needs, as pt is bed/wheelchair reliant. States over past week she was not eating because she couldn't sleep. When asked how those two things are related, patient said "I don't know" and when asked if she had appetite, then said no. Unable to find any reason pt suddenly had insomnia; denies any new meds, new supplements, changes in medication doses.   ROS: Denies any new symptoms aside from fatigue/malaise, insomnia, and loss of appetite. Denies any CP, SOB, changes in urination, n/v/d, abd pain, leg swelling. No recent travel or sick contacts. No fevers, chills.     Triage vitals were: 92/65, 74bpm, RR20, 100% on RA, 98.4  Repeat vitals notable for: 102/60, 67bpm, RR18, 98% on 4L   Labs notable for: WBC 8.27, Na 117, K 3.3, Cr 2.3, lactate 1.2,   Imaging: CXR unremarkable  Treatments: given 500cc NS  Pending: BNP, repeat BMP 60 y/o woman w PMHx of HTN, HLD, DM, CAD s/p stent, HFrEF 25%, prior ICD s/p removal due to infection, severe pulm HTN, follows w Cardio NP Cyn Manzano, COPD on 4L NC, CORNELIUS no CPAP, CKD baseline Cr 1.3, CVA w residual L sided weakness and baseline bedbound vs wheelchair reliant, hypothyroidism, presented to ED for 1 week of malaise/fatigue, decreased PO intake and insomnia, in ED found to have WBC 8.27, Na 117, K 3.3, Cr 2.3, lactate 1.2, , given 500cc NS, CXR unremarkable, admitted to medicine stepdown 6/30/24 for hyponatremia of 117 and JEMIMA.  HCP brother Pérez Ramos whose phone number she cannot recall     Pt is a very brief historian - mostly asked for food and blanket during our conversation.   States she lives at home w HHA helping w food and other home needs, as pt is bed/wheelchair reliant. States over past week she was not eating because she couldn't sleep. When asked how those two things are related, patient said "I don't know" and when asked if she had appetite, then said no. Unable to find any reason pt suddenly had insomnia; denies any new meds, new supplements, changes in medication doses.   ROS: Denies any new symptoms aside from fatigue/malaise, insomnia, and loss of appetite. Denies any CP, SOB, changes in urination, n/v/d, abd pain, leg swelling. No recent travel or sick contacts. No fevers, chills.     Triage vitals were: 92/65, 74bpm, RR20, 100% on RA, 98.4  Repeat vitals notable for: 102/60, 67bpm, RR18, 98% on 4L   Labs notable for: WBC 8.27, Na 117, K 3.3, Cr 2.3, lactate 1.2, , UA(+)  Imaging: CXR unremarkable  Treatments: given 500cc NS  Pending: BNP, repeat BMP

## 2024-07-01 LAB
A1C WITH ESTIMATED AVERAGE GLUCOSE RESULT: 13.1 % — HIGH (ref 4–5.6)
ALBUMIN SERPL ELPH-MCNC: 3.6 G/DL — SIGNIFICANT CHANGE UP (ref 3.5–5.2)
ALP SERPL-CCNC: 177 U/L — HIGH (ref 30–115)
ALT FLD-CCNC: 13 U/L — SIGNIFICANT CHANGE UP (ref 0–41)
ANION GAP SERPL CALC-SCNC: 14 MMOL/L — SIGNIFICANT CHANGE UP (ref 7–14)
ANION GAP SERPL CALC-SCNC: 16 MMOL/L — HIGH (ref 7–14)
APPEARANCE UR: CLEAR — SIGNIFICANT CHANGE UP
AST SERPL-CCNC: 15 U/L — SIGNIFICANT CHANGE UP (ref 0–41)
BACTERIA # UR AUTO: ABNORMAL /HPF
BASOPHILS # BLD AUTO: 0.09 K/UL — SIGNIFICANT CHANGE UP (ref 0–0.2)
BASOPHILS NFR BLD AUTO: 1 % — SIGNIFICANT CHANGE UP (ref 0–1)
BILIRUB SERPL-MCNC: 0.8 MG/DL — SIGNIFICANT CHANGE UP (ref 0.2–1.2)
BILIRUB UR-MCNC: NEGATIVE — SIGNIFICANT CHANGE UP
BLD GP AB SCN SERPL QL: SIGNIFICANT CHANGE UP
BUN SERPL-MCNC: 27 MG/DL — HIGH (ref 10–20)
BUN SERPL-MCNC: 35 MG/DL — HIGH (ref 10–20)
CALCIUM SERPL-MCNC: 8.7 MG/DL — SIGNIFICANT CHANGE UP (ref 8.4–10.5)
CALCIUM SERPL-MCNC: 8.9 MG/DL — SIGNIFICANT CHANGE UP (ref 8.4–10.5)
CAST: 1 /LPF — SIGNIFICANT CHANGE UP (ref 0–4)
CHLORIDE SERPL-SCNC: 88 MMOL/L — LOW (ref 98–110)
CHLORIDE SERPL-SCNC: 91 MMOL/L — LOW (ref 98–110)
CO2 SERPL-SCNC: 22 MMOL/L — SIGNIFICANT CHANGE UP (ref 17–32)
CO2 SERPL-SCNC: 22 MMOL/L — SIGNIFICANT CHANGE UP (ref 17–32)
COLOR SPEC: YELLOW — SIGNIFICANT CHANGE UP
CREAT ?TM UR-MCNC: 29 MG/DL — SIGNIFICANT CHANGE UP
CREAT SERPL-MCNC: 1.5 MG/DL — SIGNIFICANT CHANGE UP (ref 0.7–1.5)
CREAT SERPL-MCNC: 1.7 MG/DL — HIGH (ref 0.7–1.5)
DIFF PNL FLD: ABNORMAL
EGFR: 35 ML/MIN/1.73M2 — LOW
EGFR: 40 ML/MIN/1.73M2 — LOW
EOSINOPHIL # BLD AUTO: 0.08 K/UL — SIGNIFICANT CHANGE UP (ref 0–0.7)
EOSINOPHIL NFR BLD AUTO: 0.9 % — SIGNIFICANT CHANGE UP (ref 0–8)
ESTIMATED AVERAGE GLUCOSE: 329 MG/DL — HIGH (ref 68–114)
FERRITIN SERPL-MCNC: 769 NG/ML — HIGH (ref 13–330)
GAS PNL BLDA: SIGNIFICANT CHANGE UP
GLUCOSE BLDC GLUCOMTR-MCNC: 145 MG/DL — HIGH (ref 70–99)
GLUCOSE BLDC GLUCOMTR-MCNC: 151 MG/DL — HIGH (ref 70–99)
GLUCOSE BLDC GLUCOMTR-MCNC: 179 MG/DL — HIGH (ref 70–99)
GLUCOSE BLDC GLUCOMTR-MCNC: 217 MG/DL — HIGH (ref 70–99)
GLUCOSE SERPL-MCNC: 155 MG/DL — HIGH (ref 70–99)
GLUCOSE SERPL-MCNC: 156 MG/DL — HIGH (ref 70–99)
GLUCOSE UR QL: NEGATIVE MG/DL — SIGNIFICANT CHANGE UP
HCT VFR BLD CALC: 26.9 % — LOW (ref 37–47)
HGB BLD-MCNC: 9.3 G/DL — LOW (ref 12–16)
IMM GRANULOCYTES NFR BLD AUTO: 0.8 % — HIGH (ref 0.1–0.3)
IRON SATN MFR SERPL: 39 % — SIGNIFICANT CHANGE UP (ref 15–50)
IRON SATN MFR SERPL: 72 UG/DL — SIGNIFICANT CHANGE UP (ref 35–150)
KETONES UR-MCNC: NEGATIVE MG/DL — SIGNIFICANT CHANGE UP
LEUKOCYTE ESTERASE UR-ACNC: ABNORMAL
LYMPHOCYTES # BLD AUTO: 0.73 K/UL — LOW (ref 1.2–3.4)
LYMPHOCYTES # BLD AUTO: 8.4 % — LOW (ref 20.5–51.1)
MAGNESIUM SERPL-MCNC: 2.2 MG/DL — SIGNIFICANT CHANGE UP (ref 1.8–2.4)
MCHC RBC-ENTMCNC: 27.5 PG — SIGNIFICANT CHANGE UP (ref 27–31)
MCHC RBC-ENTMCNC: 34.6 G/DL — SIGNIFICANT CHANGE UP (ref 32–37)
MCV RBC AUTO: 79.6 FL — LOW (ref 81–99)
MONOCYTES # BLD AUTO: 0.42 K/UL — SIGNIFICANT CHANGE UP (ref 0.1–0.6)
MONOCYTES NFR BLD AUTO: 4.8 % — SIGNIFICANT CHANGE UP (ref 1.7–9.3)
NEUTROPHILS # BLD AUTO: 7.31 K/UL — HIGH (ref 1.4–6.5)
NEUTROPHILS NFR BLD AUTO: 84.1 % — HIGH (ref 42.2–75.2)
NITRITE UR-MCNC: NEGATIVE — SIGNIFICANT CHANGE UP
NRBC # BLD: 0 /100 WBCS — SIGNIFICANT CHANGE UP (ref 0–0)
OSMOLALITY UR: 131 MOS/KG — SIGNIFICANT CHANGE UP (ref 50–1200)
PH UR: 6 — SIGNIFICANT CHANGE UP (ref 5–8)
PHOSPHATE SERPL-MCNC: 2.2 MG/DL — SIGNIFICANT CHANGE UP (ref 2.1–4.9)
PLATELET # BLD AUTO: 198 K/UL — SIGNIFICANT CHANGE UP (ref 130–400)
PMV BLD: 10.3 FL — SIGNIFICANT CHANGE UP (ref 7.4–10.4)
POTASSIUM SERPL-MCNC: 3.4 MMOL/L — LOW (ref 3.5–5)
POTASSIUM SERPL-MCNC: 4 MMOL/L — SIGNIFICANT CHANGE UP (ref 3.5–5)
POTASSIUM SERPL-SCNC: 3.4 MMOL/L — LOW (ref 3.5–5)
POTASSIUM SERPL-SCNC: 4 MMOL/L — SIGNIFICANT CHANGE UP (ref 3.5–5)
POTASSIUM UR-SCNC: 4 MMOL/L — SIGNIFICANT CHANGE UP
PROCALCITONIN SERPL-MCNC: 0.34 NG/ML — HIGH (ref 0.02–0.1)
PROT ?TM UR-MCNC: 10 MG/DLG/24H — SIGNIFICANT CHANGE UP
PROT SERPL-MCNC: 6.3 G/DL — SIGNIFICANT CHANGE UP (ref 6–8)
PROT UR-MCNC: NEGATIVE MG/DL — SIGNIFICANT CHANGE UP
PROT/CREAT UR-RTO: 0.3 RATIO — HIGH (ref 0–0.2)
RBC # BLD: 3.38 M/UL — LOW (ref 4.2–5.4)
RBC # FLD: 13.8 % — SIGNIFICANT CHANGE UP (ref 11.5–14.5)
RBC CASTS # UR COMP ASSIST: 0 /HPF — SIGNIFICANT CHANGE UP (ref 0–4)
SODIUM SERPL-SCNC: 126 MMOL/L — LOW (ref 135–146)
SODIUM SERPL-SCNC: 127 MMOL/L — LOW (ref 135–146)
SODIUM UR-SCNC: <20 MMOL/L — SIGNIFICANT CHANGE UP
SP GR SPEC: 1 — SIGNIFICANT CHANGE UP (ref 1–1.03)
SQUAMOUS # UR AUTO: 0 /HPF — SIGNIFICANT CHANGE UP (ref 0–5)
T4 FREE+ TSH PNL SERPL: 0.88 UIU/ML — SIGNIFICANT CHANGE UP (ref 0.27–4.2)
TIBC SERPL-MCNC: 187 UG/DL — LOW (ref 220–430)
TROPONIN T, HIGH SENSITIVITY RESULT: 85 NG/L — CRITICAL HIGH (ref 6–13)
TROPONIN T, HIGH SENSITIVITY RESULT: 86 NG/L — CRITICAL HIGH (ref 6–13)
UIBC SERPL-MCNC: 115 UG/DL — SIGNIFICANT CHANGE UP (ref 110–370)
UROBILINOGEN FLD QL: 0.2 MG/DL — SIGNIFICANT CHANGE UP (ref 0.2–1)
UUN UR-MCNC: 186 MG/DL — SIGNIFICANT CHANGE UP
WBC # BLD: 8.7 K/UL — SIGNIFICANT CHANGE UP (ref 4.8–10.8)
WBC # FLD AUTO: 8.7 K/UL — SIGNIFICANT CHANGE UP (ref 4.8–10.8)
WBC UR QL: 148 /HPF — HIGH (ref 0–5)

## 2024-07-01 PROCEDURE — 99222 1ST HOSP IP/OBS MODERATE 55: CPT

## 2024-07-01 PROCEDURE — 71045 X-RAY EXAM CHEST 1 VIEW: CPT | Mod: 26

## 2024-07-01 PROCEDURE — 99223 1ST HOSP IP/OBS HIGH 75: CPT

## 2024-07-01 PROCEDURE — 99233 SBSQ HOSP IP/OBS HIGH 50: CPT

## 2024-07-01 PROCEDURE — 99291 CRITICAL CARE FIRST HOUR: CPT

## 2024-07-01 RX ORDER — FLUCONAZOLE 200 MG
150 TABLET ORAL ONCE
Refills: 0 | Status: COMPLETED | OUTPATIENT
Start: 2024-07-01 | End: 2024-07-01

## 2024-07-01 RX ORDER — CALAMINE
1 LOTION (ML) TOPICAL THREE TIMES A DAY
Refills: 0 | Status: DISCONTINUED | OUTPATIENT
Start: 2024-07-01 | End: 2024-07-11

## 2024-07-01 RX ORDER — NOREPINEPHRINE BITARTRATE 1 MG/ML
0.05 INJECTION INTRAVENOUS
Qty: 8 | Refills: 0 | Status: DISCONTINUED | OUTPATIENT
Start: 2024-07-01 | End: 2024-07-02

## 2024-07-01 RX ORDER — DEXTROSE MONOHYDRATE AND SODIUM CHLORIDE 5; .3 G/100ML; G/100ML
1000 INJECTION, SOLUTION INTRAVENOUS
Refills: 0 | Status: DISCONTINUED | OUTPATIENT
Start: 2024-07-01 | End: 2024-07-01

## 2024-07-01 RX ORDER — MIDODRINE HYDROCHLORIDE 10 MG/1
5 TABLET ORAL ONCE
Refills: 0 | Status: DISCONTINUED | OUTPATIENT
Start: 2024-07-01 | End: 2024-07-01

## 2024-07-01 RX ORDER — DEXTROSE MONOHYDRATE AND SODIUM CHLORIDE 5; .3 G/100ML; G/100ML
500 INJECTION, SOLUTION INTRAVENOUS
Refills: 0 | Status: DISCONTINUED | OUTPATIENT
Start: 2024-07-01 | End: 2024-07-02

## 2024-07-01 RX ORDER — DEXTROSE MONOHYDRATE AND SODIUM CHLORIDE 5; .3 G/100ML; G/100ML
1000 INJECTION, SOLUTION INTRAVENOUS
Refills: 0 | Status: DISCONTINUED | OUTPATIENT
Start: 2024-07-01 | End: 2024-07-02

## 2024-07-01 RX ORDER — DEXTROSE MONOHYDRATE AND SODIUM CHLORIDE 5; .3 G/100ML; G/100ML
500 INJECTION, SOLUTION INTRAVENOUS ONCE
Refills: 0 | Status: COMPLETED | OUTPATIENT
Start: 2024-07-01 | End: 2024-07-01

## 2024-07-01 RX ORDER — MIDODRINE HYDROCHLORIDE 10 MG/1
10 TABLET ORAL ONCE
Refills: 0 | Status: COMPLETED | OUTPATIENT
Start: 2024-07-01 | End: 2024-07-01

## 2024-07-01 RX ORDER — MIDODRINE HYDROCHLORIDE 10 MG/1
TABLET ORAL
Refills: 0 | Status: DISCONTINUED | OUTPATIENT
Start: 2024-07-01 | End: 2024-07-01

## 2024-07-01 RX ORDER — POTASSIUM CHLORIDE 600 MG/1
20 TABLET, FILM COATED, EXTENDED RELEASE ORAL
Refills: 0 | Status: COMPLETED | OUTPATIENT
Start: 2024-07-01 | End: 2024-07-01

## 2024-07-01 RX ORDER — MIDODRINE HYDROCHLORIDE 10 MG/1
5 TABLET ORAL EVERY 8 HOURS
Refills: 0 | Status: DISCONTINUED | OUTPATIENT
Start: 2024-07-01 | End: 2024-07-01

## 2024-07-01 RX ORDER — CEFTRIAXONE SODIUM 500 MG
1000 VIAL (EA) INJECTION EVERY 24 HOURS
Refills: 0 | Status: COMPLETED | OUTPATIENT
Start: 2024-07-01 | End: 2024-07-03

## 2024-07-01 RX ORDER — DIPHENHYDRAMINE HCL 12.5MG/5ML
25 ELIXIR ORAL ONCE
Refills: 0 | Status: COMPLETED | OUTPATIENT
Start: 2024-07-01 | End: 2024-07-01

## 2024-07-01 RX ADMIN — NOREPINEPHRINE BITARTRATE 8.44 MICROGRAM(S)/KG/MIN: 1 INJECTION INTRAVENOUS at 05:38

## 2024-07-01 RX ADMIN — Medication 1 APPLICATION(S): at 06:56

## 2024-07-01 RX ADMIN — CLOPIDOGREL BISULFATE 75 MILLIGRAM(S): 75 TABLET, FILM COATED ORAL at 11:05

## 2024-07-01 RX ADMIN — INSULIN LISPRO 1: 100 INJECTION, SOLUTION SUBCUTANEOUS at 18:29

## 2024-07-01 RX ADMIN — Medication 100 MILLIGRAM(S): at 09:53

## 2024-07-01 RX ADMIN — POTASSIUM CHLORIDE 20 MILLIEQUIVALENT(S): 600 TABLET, FILM COATED, EXTENDED RELEASE ORAL at 14:54

## 2024-07-01 RX ADMIN — INSULIN LISPRO 2: 100 INJECTION, SOLUTION SUBCUTANEOUS at 11:35

## 2024-07-01 RX ADMIN — Medication 25 MICROGRAM(S): at 06:56

## 2024-07-01 RX ADMIN — Medication 1 APPLICATION(S): at 21:34

## 2024-07-01 RX ADMIN — HEPARIN SODIUM 5000 UNIT(S): 50 INJECTION, SOLUTION INTRAVENOUS at 21:24

## 2024-07-01 RX ADMIN — DEXTROSE MONOHYDRATE AND SODIUM CHLORIDE 500 MILLILITER(S): 5; .3 INJECTION, SOLUTION INTRAVENOUS at 04:00

## 2024-07-01 RX ADMIN — Medication 1 APPLICATION(S): at 13:00

## 2024-07-01 RX ADMIN — DEXTROSE MONOHYDRATE AND SODIUM CHLORIDE 100 MILLILITER(S): 5; .3 INJECTION, SOLUTION INTRAVENOUS at 09:43

## 2024-07-01 RX ADMIN — Medication 25 MILLIGRAM(S): at 11:05

## 2024-07-01 RX ADMIN — DEXTROSE MONOHYDRATE AND SODIUM CHLORIDE 75 MILLILITER(S): 5; .3 INJECTION, SOLUTION INTRAVENOUS at 05:00

## 2024-07-01 RX ADMIN — POTASSIUM CHLORIDE 20 MILLIEQUIVALENT(S): 600 TABLET, FILM COATED, EXTENDED RELEASE ORAL at 21:24

## 2024-07-01 RX ADMIN — Medication 150 MILLIGRAM(S): at 11:05

## 2024-07-01 RX ADMIN — MIDODRINE HYDROCHLORIDE 10 MILLIGRAM(S): 10 TABLET ORAL at 04:48

## 2024-07-01 RX ADMIN — Medication 650 MILLIGRAM(S): at 02:52

## 2024-07-01 RX ADMIN — POTASSIUM CHLORIDE 20 MILLIEQUIVALENT(S): 600 TABLET, FILM COATED, EXTENDED RELEASE ORAL at 17:45

## 2024-07-01 RX ADMIN — ATORVASTATIN CALCIUM 80 MILLIGRAM(S): 20 TABLET, FILM COATED ORAL at 21:24

## 2024-07-01 NOTE — PATIENT PROFILE ADULT - FALL HARM RISK - HARM RISK INTERVENTIONS
Assistance with ambulation/Assistance OOB with selected safe patient handling equipment/Communicate Risk of Fall with Harm to all staff/Discuss with provider need for PT consult/Monitor gait and stability/Provide patient with walking aids - walker, cane, crutches/Reinforce activity limits and safety measures with patient and family/Sit up slowly, dangle for a short time, stand at bedside before walking/Tailored Fall Risk Interventions/Visual Cue: Yellow wristband and red socks/Bed in lowest position, wheels locked, appropriate side rails in place/Call bell, personal items and telephone in reach/Instruct patient to call for assistance before getting out of bed or chair/Non-slip footwear when patient is out of bed/[place] to call system/Physically safe environment - no spills, clutter or unnecessary equipment/Purposeful Proactive Rounding/Room/bathroom lighting operational, light cord in reach

## 2024-07-01 NOTE — CONSULT NOTE ADULT - SUBJECTIVE AND OBJECTIVE BOX
HPI:  60 y/o woman w PMHx of HTN, HLD, DM, CAD s/p stent, HFrEF 25%, prior ICD s/p removal due to infection, severe pulm HTN, follows w Cardio NP Cyn Manzano, COPD on 4L NC, CORNELIUS no CPAP, CKD baseline Cr 1.3, CVA w residual L sided weakness and baseline bedbound vs wheelchair reliant, hypothyroidism, presented to ED for 1 week of malaise/fatigue, decreased PO intake and insomnia, in ED found to have WBC 8.27, Na 117, K 3.3, Cr 2.3, lactate 1.2, , given 500cc NS, CXR unremarkable, admitted to medicine stepdown 6/30/24 for hyponatremia of 117 and JEMIMA.  HCP brother Pérez Ramos whose phone number she cannot recall     Pt is a very brief historian - mostly asked for food and blanket during our conversation.   States she lives at home w HHA helping w food and other home needs, as pt is bed/wheelchair reliant. States over past week she was not eating because she couldn't sleep. When asked how those two things are related, patient said "I don't know" and when asked if she had appetite, then said no. Unable to find any reason pt suddenly had insomnia; denies any new meds, new supplements, changes in medication doses.   ROS: Denies any new symptoms aside from fatigue/malaise, insomnia, and loss of appetite. Denies any CP, SOB, changes in urination, n/v/d, abd pain, leg swelling. No recent travel or sick contacts. No fevers, chills.     Triage vitals were: 92/65, 74bpm, RR20, 100% on RA, 98.4  Repeat vitals notable for: 102/60, 67bpm, RR18, 98% on 4L   Labs notable for: WBC 8.27, Na 117, K 3.3, Cr 2.3, lactate 1.2, , UA(+)  Imaging: CXR unremarkable  Treatments: given 500cc NS  Pending: BNP, repeat BMP (30 Jun 2024 18:54)    PERTINENT PM/SXH:   Hypertension    Hyperlipidemia    Anxiety and depression    COPD, severe    CHF (congestive heart failure)    Cerebrovascular accident (CVA)    Type 2 diabetes mellitus    CKD (chronic kidney disease), stage II      No significant past surgical history    No significant past surgical history      FAMILY HISTORY:  FH: diabetes mellitus (Father, Mother)     difficult to obtain from patient  ITEMS NOT CHECKED ARE NOT PRESENT    SOCIAL HISTORY:   Significant other/partner[ ]  Children[ ]  Rastafari/Spirituality:  Substance hx:  [ ]   Tobacco hx:  [ ]   Alcohol hx: [ ]   Living Situation: [ ]Home  [ ]Long term care  [ ]Rehab [ ]Other  Home Services: [ ] HHA [ ] Visting RN [ ] Hospice  Occupation:  Home Opioid hx:  [ ] Y [ ] N [ ] I-Stop Reference No:    ADVANCE DIRECTIVES:    [ ] Full Code [ ] DNR  MOLST  [ ]  Living Will  [ ]   DECISION MAKER(s):  [ ] Health Care Proxy(s)  [ ] Surrogate(s)  [ ] Guardian           Name(s): Phone Number(s):    BASELINE (I)ADL(s) (prior to admission):  [place]: [ ]Total  [ ] Moderate [ ]Dependent  Palliative Performance Status Version 2:         %    [email or web address].org/files/news/palliative_performance_scale_[email or web address].pdf    Allergies    No Known Allergies    Intolerances    MEDICATIONS  (STANDING):  atorvastatin 80 milliGRAM(s) Oral at bedtime  calamine/zinc oxide Lotion 1 Application(s) Topical three times a day  cefTRIAXone   IVPB 1000 milliGRAM(s) IV Intermittent every 24 hours  clopidogrel Tablet 75 milliGRAM(s) Oral daily  dextrose 10% Bolus 125 milliLiter(s) IV Bolus once  dextrose 5%. 1000 milliLiter(s) (50 mL/Hr) IV Continuous <Continuous>  dextrose 5%. 1000 milliLiter(s) (100 mL/Hr) IV Continuous <Continuous>  dextrose 50% Injectable 25 Gram(s) IV Push once  dextrose 50% Injectable 12.5 Gram(s) IV Push once  glucagon  Injectable 1 milliGRAM(s) IntraMuscular once  heparin   Injectable 5000 Unit(s) SubCutaneous every 8 hours  insulin lispro (ADMELOG) corrective regimen sliding scale   SubCutaneous three times a day before meals  insulin lispro (ADMELOG) corrective regimen sliding scale   SubCutaneous at bedtime  levothyroxine 25 MICROGram(s) Oral daily  norepinephrine Infusion 0.05 MICROgram(s)/kG/Min (8.44 mL/Hr) IV Continuous <Continuous>  sodium chloride 0.45%. 1000 milliLiter(s) (100 mL/Hr) IV Continuous <Continuous>    MEDICATIONS  (PRN):  acetaminophen     Tablet .. 650 milliGRAM(s) Oral every 6 hours PRN Temp greater or equal to 38C (100.4F), Mild Pain (1 - 3)  dextrose Oral Gel 15 Gram(s) Oral once PRN Blood Glucose LESS THAN 70 milliGRAM(s)/deciliter  melatonin 3 milliGRAM(s) Oral at bedtime PRN Insomnia      PRESENT SYMPTOMS: [ ]Unable to obtain due to poor mentation   Source if other than patient:  [ ]Family   [ ]Team     Pain: [ ]yes [ ]no  QOL impact -   Location -                    Aggravating factors -  Quality -  Radiation -  Timing-  Severity (0-10 scale):  Minimal acceptable level (0-10 scale):     CPOT:    https://www.[facility].org/getattachment/[email or web address]/Critical-Care-Pain-Observation-Tool-(CPOT)    PAIN AD Score:   http://geriatrictoolkit.[facility]/cog/painad.pdf (press ctrl +  left click to view)    Dyspnea:                           [ ]None[ ]Mild [ ]Moderate [ ]Severe     Respiratory Distress Observation Scale (RDOS):   A score of 0 to 2 signifies little or no respiratory distress, 3 signifies mild distress, scores 4 to 6 indicate moderate distress, and scores greater than 7 signify severe distress  [email or web address]://[email or web address]/sites/default/[email or web address]-isogigmmkgb-xjwepedl-dxcbdsbcptt-dtgac.pdf    Anxiety:                             [ ]None[ ]Mild [ ]Moderate [ ]Severe   Fatigue:                             [ ]None[ ]Mild [ ]Moderate [ ]Severe   Nausea:                             [ ]None[ ]Mild [ ]Moderate [ ]Severe   Loss of appetite:              [ ]None[ ]Mild [ ]Moderate [ ]Severe   Constipation:                    [ ]None[ ]Mild [ ]Moderate [ ]Severe    Other Symptoms:  [ ]All other review of systems negative     Palliative Performance Status Version 2:         %    http://npcrc.org/files/news/palliative_performance_scale_ppsv2.pdf  PHYSICAL EXAM:  Vital Signs Last 24 Hrs  T(C): 36.7 (01 Jul 2024 07:22), Max: 36.9 (30 Jun 2024 20:37)  T(F): 98.1 (01 Jul 2024 07:22), Max: 98.5 (30 Jun 2024 20:37)  HR: 83 (01 Jul 2024 11:33) (60 - 100)  BP: 97/50 (01 Jul 2024 11:33) (65/43 - 135/63)  BP(mean): 91 (01 Jul 2024 06:10) (45 - 91)  RR: 16 (01 Jul 2024 11:33) (16 - 18)  SpO2: 100% (01 Jul 2024 11:33) (98% - 100%)    Parameters below as of 01 Jul 2024 11:33  Patient On (Oxygen Delivery Method): nasal cannula  O2 Flow (L/min): 4   I&O's Summary      GENERAL:  [X ] No acute distress [ ]Lethargic  [ ]Unarousable  [ ]Verbal  [ ]Non-Verbal [ ]Cachexia    BEHAVIORAL/PSYCH:  [ ]Alert and Oriented x  [ ] Anxiety [ ] Delirium [ ] Agitation [X ] Calm   EYES: [ ] No scleral icterus [ ] Scleral icterus [ ] Closed  ENMT:  [ ]Dry mouth  [ ]No external oral lesions [ ] No external ear or nose lesions  CARDIOVASCULAR:  [ ]Regular [ ]Irregular [ ]Tachy [ ]Not Tachy  [ ]Rambo [ ] Edema [ ] No edema  PULMONARY:  [ ]Tachypnea  [ ]Audible excessive secretions [X ] No labored breathing [ ] labored breathing  GASTROINTESTINAL: [ ]Soft  [ ]Distended  [ X]Not distended [ ]Non tender [ ]Tender  MUSCULOSKELETAL: [ ]No clubbing [ ] clubbing  [ ] No cyanosis [ ] cyanosis  NEUROLOGIC: [ ]No focal deficits  [ ]Follows commands  [ ]Does not follow commands  [ ]Cognitive impairment  [ ]Dysphagia  [ ]Dysarthria  [ ]Paresis   SKIN: [ ] Jaundiced [X ] Non-jaundiced [ ]Rash [ ]No Rash [ ] Warm [ ] Dry  MISC/LINES: [ ] ET tube [ ] Trach [ ]NGT/OGT [ ]PEG [ ][place]    CRITICAL CARE:  [ ] Shock Present  [ ]Septic [ ]Cardiogenic [ ]Neurologic [ ]Hypovolemic  [ ]  Vasopressors [ ]  Inotropes   [ ]Respiratory failure present [ ]Mechanical ventilation [ ]Non-invasive ventilatory support [ ]High flow  [ ]Acute  [ ]Chronic [ ]Hypoxic  [ ]Hypercarbic [ ]Other  [ ]Other organ failure     LABS: reviewed by me                        9.3    8.70  )-----------( 198      ( 01 Jul 2024 05:46 )             26.9   07-01    126<L>  |  88<L>  |  35<H>  ----------------------------<  156<H>  3.4<L>   |  22  |  1.7<H>    Ca    8.7      01 Jul 2024 05:46  Phos  2.2     07-01  Mg     2.2     07-01    TPro  6.3  /  Alb  3.6  /  TBili  0.8  /  DBili  x   /  AST  15  /  ALT  13  /  AlkPhos  177<H>  07-01  PT/INR - ( 30 Jun 2024 12:20 )   PT: 10.70 sec;   INR: 0.94 ratio         PTT - ( 30 Jun 2024 12:20 )  PTT:31.7 sec    Urinalysis Basic - ( 01 Jul 2024 05:46 )    Color: x / Appearance: x / SG: x / pH: x  Gluc: 156 mg/dL / Ketone: x  / Bili: x / Urobili: x   Blood: x / Protein: x / Nitrite: x   Leuk Esterase: x / RBC: x / WBC x   Sq Epi: x / Non Sq Epi: x / Bacteria: x      RADIOLOGY & ADDITIONAL STUDIES: reviewed by me    PROTEIN CALORIE MALNUTRITION PRESENT: [ ]mild [ ]moderate [ ]severe [ ]underweight [ ]morbid obesity  https://www.andeal.org/vault/7390/web/files/ONC/Table_Clinical%20Characteristics%20to%20Document%20Malnutrition-White%20JV%20et%20al%202012.pdf    Height (cm): 149.9 (11-10-23 @ 22:00)  Weight (kg): 108 (11-10-23 @ 22:00)  BMI (kg/m2): 48.1 (11-10-23 @ 22:00)    [ ]PPSV2 < or = to 30% [ ]significant weight loss  [ ]poor nutritional intake  [ ]anasarca      [ ]Artificial Nutrition          Palliative Care Spiritual/Emotional Screening Tool Question  Severity (0-4):                    OR                    [X ] Unable to determine/NA  Score of 2 or greater indicates recommendation of Chaplaincy referral  Chaplaincy Referral: [ ] Yes [ ] Refused [ ] Following     Caregiver [place]:  [ ] Yes [ ] No    OR    [x ] Unable to determine. Will assess at later time if appropriate.  Social Work Referral [ ]  Patient and Family Centered Care Referral [ ]    Anticipatory Grief Present: [ ] Yes [ ] No    OR     [ x] Unable to determine. Will assess at later time if appropriate.  Social Work Referral [ ]  Patient and Family Centered Care Referral [ ]    REFERRALS:   [ ]Chaplaincy  [ ]Hospice  [ ]Child Life  [ ]Social Work  [ ]Case management [ ]Holistic Therapy     Palliative care education provided to patient and/or family    Goals of Care Document:     ______________ HPI:  60 y/o woman w PMHx of HTN, HLD, DM, CAD s/p stent, HFrEF 25%, prior ICD s/p removal due to infection, severe pulm HTN, follows w Cardio NP Cyn Manzano, COPD on 4L NC, CORNELIUS no CPAP, CKD baseline Cr 1.3, CVA w residual L sided weakness and baseline bedbound vs wheelchair reliant, hypothyroidism, presented to ED for 1 week of malaise/fatigue, decreased PO intake and insomnia, in ED found to have WBC 8.27, Na 117, K 3.3, Cr 2.3, lactate 1.2, , given 500cc NS, CXR unremarkable, admitted to medicine stepdown 6/30/24 for hyponatremia of 117 and JEMIMA.  HCP brother Pérez Ramos whose phone number she cannot recall     Pt is a very brief historian - mostly asked for food and blanket during our conversation.   States she lives at home w HHA helping w food and other home needs, as pt is bed/wheelchair reliant. States over past week she was not eating because she couldn't sleep. When asked how those two things are related, patient said "I don't know" and when asked if she had appetite, then said no. Unable to find any reason pt suddenly had insomnia; denies any new meds, new supplements, changes in medication doses.   ROS: Denies any new symptoms aside from fatigue/malaise, insomnia, and loss of appetite. Denies any CP, SOB, changes in urination, n/v/d, abd pain, leg swelling. No recent travel or sick contacts. No fevers, chills.     Triage vitals were: 92/65, 74bpm, RR20, 100% on RA, 98.4  Repeat vitals notable for: 102/60, 67bpm, RR18, 98% on 4L   Labs notable for: WBC 8.27, Na 117, K 3.3, Cr 2.3, lactate 1.2, , UA(+)  Imaging: CXR unremarkable  Treatments: given 500cc NS  Pending: BNP, repeat BMP (30 Jun 2024 18:54)    PERTINENT PM/SXH:   Hypertension    Hyperlipidemia    Anxiety and depression    COPD, severe    CHF (congestive heart failure)    Cerebrovascular accident (CVA)    Type 2 diabetes mellitus    CKD (chronic kidney disease), stage II      No significant past surgical history    No significant past surgical history      FAMILY HISTORY:  FH: diabetes mellitus (Father, Mother)     difficult to obtain from patient  ITEMS NOT CHECKED ARE NOT PRESENT    SOCIAL HISTORY:   Significant other/partner[ ]  Children[ ]  Rastafarian/Spirituality:  Substance hx:  [ ]   Tobacco hx:  [ ]   Alcohol hx: [ ]   Living Situation: [x ]Home  [ ]Long term care  [ ]Rehab [ ]Other  Home Services: [ x] HHA [ ] Visting RN [ ] Hospice  Occupation:  Home Opioid hx:  [ ] Y [ ] N [ ] I-Stop Reference No:    ADVANCE DIRECTIVES:    [x ] Full Code [ ] DNR  MOLST  [ ]  Living Will  [ ]   DECISION MAKER(s):  [ ] Health Care Proxy(s)  [ ] Surrogate(s)  [ ] Guardian           Name(s): Phone Number(s):    BASELINE (I)ADL(s) (prior to admission):  [place]: [ ]Total  [ ] Moderate [ x]Dependent  Palliative Performance Status Version 2:         %    http://npcrc.org/files/news/palliative_performance_scale_ppsv2.pdf    Allergies    No Known Allergies    Intolerances    MEDICATIONS  (STANDING):  atorvastatin 80 milliGRAM(s) Oral at bedtime  calamine/zinc oxide Lotion 1 Application(s) Topical three times a day  cefTRIAXone   IVPB 1000 milliGRAM(s) IV Intermittent every 24 hours  clopidogrel Tablet 75 milliGRAM(s) Oral daily  dextrose 10% Bolus 125 milliLiter(s) IV Bolus once  dextrose 5%. 1000 milliLiter(s) (50 mL/Hr) IV Continuous <Continuous>  dextrose 5%. 1000 milliLiter(s) (100 mL/Hr) IV Continuous <Continuous>  dextrose 50% Injectable 25 Gram(s) IV Push once  dextrose 50% Injectable 12.5 Gram(s) IV Push once  glucagon  Injectable 1 milliGRAM(s) IntraMuscular once  heparin   Injectable 5000 Unit(s) SubCutaneous every 8 hours  insulin lispro (ADMELOG) corrective regimen sliding scale   SubCutaneous three times a day before meals  insulin lispro (ADMELOG) corrective regimen sliding scale   SubCutaneous at bedtime  levothyroxine 25 MICROGram(s) Oral daily  norepinephrine Infusion 0.05 MICROgram(s)/kG/Min (8.44 mL/Hr) IV Continuous <Continuous>  sodium chloride 0.45%. 1000 milliLiter(s) (100 mL/Hr) IV Continuous <Continuous>    MEDICATIONS  (PRN):  acetaminophen     Tablet .. 650 milliGRAM(s) Oral every 6 hours PRN Temp greater or equal to 38C (100.4F), Mild Pain (1 - 3)  dextrose Oral Gel 15 Gram(s) Oral once PRN Blood Glucose LESS THAN 70 milliGRAM(s)/deciliter  melatonin 3 milliGRAM(s) Oral at bedtime PRN Insomnia      PRESENT SYMPTOMS: [x ]Unable to obtain due to poor mentation   Source if other than patient:  [ ]Family   [ ]Team     Pain: [ ]yes [ ]no  QOL impact -   Location -                    Aggravating factors -  Quality -  Radiation -  Timing-  Severity (0-10 scale):  Minimal acceptable level (0-10 scale):     CPOT:    https://www.[facility].org/getattachment/[email or web address]/Critical-Care-Pain-Observation-Tool-(CPOT)    PAIN AD Score:   http://geriatrictoolkit.[facility]/cog/painad.pdf (press ctrl +  left click to view)    Dyspnea:                           [ ]None[ ]Mild [ ]Moderate [ ]Severe     Respiratory Distress Observation Scale (RDOS):   A score of 0 to 2 signifies little or no respiratory distress, 3 signifies mild distress, scores 4 to 6 indicate moderate distress, and scores greater than 7 signify severe distress  [email or web address]://[email or web address].ca/sites/default/files/[email or web address]-dokyweqdxxm-anhhlwpp-digtmjungdn-nzbdz.pdf    Anxiety:                             [ ]None[ ]Mild [ ]Moderate [ ]Severe   Fatigue:                             [ ]None[ ]Mild [ ]Moderate [ ]Severe   Nausea:                             [ ]None[ ]Mild [ ]Moderate [ ]Severe   Loss of appetite:              [ ]None[ ]Mild [ ]Moderate [ ]Severe   Constipation:                    [ ]None[ ]Mild [ ]Moderate [ ]Severe    Other Symptoms:  [ ]All other review of systems negative     Palliative Performance Status Version 2:         %    http://npcrc.org/files/news/palliative_performance_scale_ppsv2.pdf  PHYSICAL EXAM:  Vital Signs Last 24 Hrs  T(C): 36.7 (01 Jul 2024 07:22), Max: 36.9 (30 Jun 2024 20:37)  T(F): 98.1 (01 Jul 2024 07:22), Max: 98.5 (30 Jun 2024 20:37)  HR: 83 (01 Jul 2024 11:33) (60 - 100)  BP: 97/50 (01 Jul 2024 11:33) (65/43 - 135/63)  BP(mean): 91 (01 Jul 2024 06:10) (45 - 91)  RR: 16 (01 Jul 2024 11:33) (16 - 18)  SpO2: 100% (01 Jul 2024 11:33) (98% - 100%)    Parameters below as of 01 Jul 2024 11:33  Patient On (Oxygen Delivery Method): nasal cannula  O2 Flow (L/min): 4   I&O's Summary      General: appears of appropriate age, lying in bed, NAD  HEENT: NCAT, anicteric sclerae, EOMI, PERRL, moist mucous membranes  Neck: Supple, nontender, no mass  Neurology: sleepy, cr. ns. grossly intact, nonfocal, sensation intact   Respiratory: CTA B/L, No W/R/R  CV: RRR, +S1/S2, no murmurs, rubs or gallops  Abdominal: Soft, NT, ND +BS, no palpable masses  Extremities: No cyanosis, no edema, + peripheral pulses  MSK: no joint erythema or warmth, no joint swelling   Heme: No obvious ecchymosis or petechiae   Skin: warm, dry, normal color  Psych: no agitation, normal affect    CRITICAL CARE:  [ x] Shock Present  [ ]Septic [x ]Cardiogenic [ ]Neurologic [ ]Hypovolemic  [ ]  Vasopressors [ ]  Inotropes   [ ]Respiratory failure present [ ]Mechanical ventilation [ ]Non-invasive ventilatory support [ ]High flow  [ ]Acute  [ ]Chronic [ ]Hypoxic  [ ]Hypercarbic [ ]Other  [ ]Other organ failure     LABS: reviewed by me                        9.3    8.70  )-----------( 198      ( 01 Jul 2024 05:46 )             26.9   07-01    126<L>  |  88<L>  |  35<H>  ----------------------------<  156<H>  3.4<L>   |  22  |  1.7<H>    Ca    8.7      01 Jul 2024 05:46  Phos  2.2     07-01  Mg     2.2     07-01    TPro  6.3  /  Alb  3.6  /  TBili  0.8  /  DBili  x   /  AST  15  /  ALT  13  /  AlkPhos  177<H>  07-01  PT/INR - ( 30 Jun 2024 12:20 )   PT: 10.70 sec;   INR: 0.94 ratio         PTT - ( 30 Jun 2024 12:20 )  PTT:31.7 sec    Urinalysis Basic - ( 01 Jul 2024 05:46 )    Color: x / Appearance: x / SG: x / pH: x  Gluc: 156 mg/dL / Ketone: x  / Bili: x / Urobili: x   Blood: x / Protein: x / Nitrite: x   Leuk Esterase: x / RBC: x / WBC x   Sq Epi: x / Non Sq Epi: x / Bacteria: x      RADIOLOGY & ADDITIONAL STUDIES: reviewed by me    PROTEIN CALORIE MALNUTRITION PRESENT: [ ]mild [ ]moderate [ ]severe [ ]underweight [ ]morbid obesity  https://www.andeal.org/vault/2440/web/files/ONC/Table_Clinical%20Characteristics%20to%20Document%20Malnutrition-White%20JV%20et%20al%202012.pdf    Height (cm): 149.9 (11-10-23 @ 22:00)  Weight (kg): 108 (11-10-23 @ 22:00)  BMI (kg/m2): 48.1 (11-10-23 @ 22:00)    [ ]PPSV2 < or = to 30% [ ]significant weight loss  [ ]poor nutritional intake  [ ]anasarca      [ ]Artificial Nutrition          Palliative Care Spiritual/Emotional Screening Tool Question  Severity (0-4):                    OR                    [X ] Unable to determine/NA  Score of 2 or greater indicates recommendation of Chaplaincy referral  Chaplaincy Referral: [ ] Yes [ ] Refused [ ] Following     Caregiver [place]:  [ ] Yes [ ] No    OR    [x ] Unable to determine. Will assess at later time if appropriate.  Social Work Referral [ ]  Patient and Family Centered Care Referral [ ]    Anticipatory Grief Present: [ ] Yes [ ] No    OR     [ x] Unable to determine. Will assess at later time if appropriate.  Social Work Referral [ ]  Patient and Family Centered Care Referral [ ]    REFERRALS:   [ ]Chaplaincy  [ ]Hospice  [ ]Child Life  [ ]Social Work  [ ]Case management [ ]Holistic Therapy     Palliative care education provided to patient and/or family    Goals of Care Document:     ______________

## 2024-07-01 NOTE — CONSULT NOTE ADULT - ASSESSMENT
60 y/o woman w PMHx of HTN, HLD, DM, CAD s/p stent, HFrEF 25%, prior ICD s/p removal due to infection, severe pulm HTN, follows w Cardio NP Cyn Manzano, COPD on 4L NC, CORNELIUS no CPAP, CKD baseline Cr 1.3, CVA w residual L sided weakness and baseline bedbound vs wheelchair reliant, hypothyroidism, presented to ED for 1 week of malaise/fatigue, decreased PO intake and insomnia, in ED found to have WBC 8.27, Na 117, K 3.3, Cr 2.3, lactate 1.2, , given 500cc NS, CXR unremarkable, Nephrology consulted for Hyponatremia and JEMIMA    #JEMIMA likely ATN 2/2 pre- renal vs AIN vs less likely cardiorenal   #Hyponatremia, hypotonic, hypovolemic likely from decreased PO intake and overdiuresis vs less likely SIADH   - baseline creatinine ~1.3  - Creatinine on admission 2.3 -> 1.7 after IV NS + LR   - Na: 117 on admission -> 126 ( within less than 24 hours)   - On exam euvolemic after IV NS and LR   - Chest X-ray: Low lung volume. Cardiomegaly, unchanged. No acute infiltrates.  - Pending Urine studies      Recommend:   - Get Urine Urea, Urine osmolality   - Renal Bladder US   - Start D5W   - Q4 hours BMP  - Check TSH   - strict I/Os   - avoid nephrotoxic meds   - Check patients weight   - Hold Torsemide     Recommendations are not final until attending attestation  58 y/o woman w PMHx of HTN, HLD, DM, CAD s/p stent, HFrEF 25%, prior ICD s/p removal due to infection, severe pulm HTN, follows w Cardio NP Cyn Manzano, COPD on 4L NC, CORNELIUS no CPAP, CKD baseline Cr 1.3, CVA w residual L sided weakness and baseline bedbound vs wheelchair reliant, hypothyroidism, presented to ED for 1 week of malaise/fatigue, decreased PO intake and insomnia, in ED found to have WBC 8.27, Na 117, K 3.3, Cr 2.3, lactate 1.2, , given 500cc NS, CXR unremarkable, Nephrology consulted for Hyponatremia and JEMIMA    #JEMIMA likely ATN 2/2 pre- renal vs AIN vs less likely cardiorenal   #Severe Hyponatremia, hypotonic, hypovolemic likely from decreased PO intake and overdiuresis vs less likely SIADH   # Hypotensive on norepinephrine - Improving   - baseline creatinine ~1.3  - Creatinine on admission 2.3 -> 1.7 after IV NS + LR   - Na: 117 on admission -> 126 ( within less than 24 hours)   - On exam euvolemic after IV NS and LR   - Chest X-ray: Low lung volume. Cardiomegaly, unchanged. No acute infiltrates.  - Pending Urine studies      Recommend:   - Get Urine Urea, Urine osmolality   - Renal Bladder US   - C/w half ns at  100ml/hr for 6 hours   - Q4 hours BMP  - Check TSH and am Cortisol   - Check Lipid profile   - strict I/Os   - avoid nephrotoxic meds   - Check patients weight   - Hold Torsemide. entresto, Farxiga and spironolactone     Recommendations are not final until attending attestation  60 y/o woman w PMHx of HTN, HLD, DM, CAD s/p stent, HFrEF 25%, prior ICD s/p removal due to infection, severe pulm HTN, follows w Cardio NP Cyn Manzano, COPD on 4L NC, CORNELIUS no CPAP, CKD baseline Cr 1.3, CVA w residual L sided weakness and baseline bedbound vs wheelchair reliant, hypothyroidism, presented to ED for 1 week of malaise/fatigue, decreased PO intake and insomnia, in ED found to have WBC 8.27, Na 117, K 3.3, Cr 2.3, lactate 1.2, , given 500cc NS, CXR unremarkable, Nephrology consulted for Hyponatremia and JEMIMA    #JEMIMA on CKD likely ATN 2/2 pre- renal   #Severe Hyponatremia, hypotonic, hypovolemic likely from decreased PO intake and overdiuresis  # Hypotensive on norepinephrine - Improving   - baseline creatinine ~1.3  - Creatinine on admission 2.3 -> 1.7 after IV NS + LR   - Na: 117 on admission -> 126 ( within less than 24 hours)   - On exam euvolemic after IV NS and LR   - Chest X-ray: Low lung volume. Cardiomegaly, unchanged. No acute infiltrates.  - Pending Urine studies      Recommend:   - Get Urine Na, Urine Urea, Urine osmolality   - Renal Bladder US   - Hold fluids for now, no need for D5  - Q4 hours BMP, if PM BMP still overcorrecting start D5   - Check TSH, uric acid and am Cortisol   - strict I/Os   - avoid nephrotoxic meds   - Hold Torsemide. entresto, Farxiga and spironolactone     Recommendations are not final until attending attestation  60 y/o woman w PMHx of HTN, HLD, DM, CAD s/p stent, HFrEF 25%, prior ICD s/p removal due to infection, severe pulm HTN, follows w Dr Ge COPD on 4L NC, CORNELIUS no CPAP, CKD baseline Cr 1.3, CVA w residual L sided weakness and baseline bedbound vs wheelchair reliant, hypothyroidism, presented to ED for 1 week of malaise/fatigue, decreased PO intake and insomnia, in ED found to have WBC 8.27, Na 117, K 3.3, Cr 2.3, lactate 1.2, , given 500cc NS, CXR unremarkable, Nephrology consulted for Hyponatremia and JEMIMA    #JEMIMA on CKD likely ATN 2/2 pre- renal   #Severe Hyponatremia, hypotonic, hypovolemic likely from decreased PO intake and overdiuresis  # Hypotensive on norepinephrine - Improving   - baseline creatinine ~1.3  - Creatinine on admission 2.3 -> 1.7 after IV NS + LR   - Na: 117 on admission -> 126 ( within less than 24 hours)   - On exam euvolemic after IV NS and LR   - Chest X-ray: Low lung volume. Cardiomegaly, unchanged. No acute infiltrates.  - Pending Urine studies      Recommend:   - Get Urine Na, Urine Urea, Urine osmolality   - Renal Bladder US   - Hold fluids for now, no need for D5  - Q4 hours BMP, if PM BMP still overcorrecting start D5   - Check TSH, uric acid and am Cortisol   - strict I/Os   - avoid nephrotoxic meds   - Hold Torsemide. entresto, Farxiga and spironolactone

## 2024-07-01 NOTE — PATIENT PROFILE ADULT - FALL HARM RISK - DEVICES
Failed outpatient treatment with cefdinir and azithromycin  Patient is afebrile in the ed with normal white count  Ct chest shows small to moderate right plueral effusion that is loculated with adjacent atelectasis . There is a 4 mm perifissular nodular opacity in the inferior right upper lobe and Cardiomegaly with a trace pericardial effusion and ascending aorta ectasia of 4.1 cm .  PSI score for pnuemonia is 69 indicating risk class 2 with 0.6-0.9% mortality  Will start on zosyn for now  F/u procalcitonin level and consider de- escalating antibiotics  ID dr Haddad consulted  Pulmonary dr Hirsch consulted None

## 2024-07-01 NOTE — CHART NOTE - NSCHARTNOTEFT_GEN_A_CORE
BP since admission but dropping from 100 systolic to 66 mmHg corresponding to a MAP of 45. Patient is completely asymptomatic and has no active complaints besides chronic itchiness. Physical examination showed ni signs of acute exacerbation of heart failure. Patient given boluses of LR and midodrine 10 mg stat with no improvement in BP. Low dose levophed started.

## 2024-07-01 NOTE — PATIENT PROFILE ADULT - PRO INTERPRETER NEED 2
Angiogram required for transplant evaluation. Orders placed. MIA Jose CNP on 2/7/2024 at 2:28 PM     English

## 2024-07-01 NOTE — SWALLOW BEDSIDE ASSESSMENT ADULT - SLP PERTINENT HISTORY OF CURRENT PROBLEM
60 y/o woman w PMHx of CVA w residual L sided weakness and baseline bedbound vs wheelchair reliant, HTN, HLD, DM, CAD s/p stent, HFrEF 25%, prior ICD s/p removal due to infection, severe pulm HTN, COPD on 4L NC, CORNELIUS no CPAP, CKD, hypothyroidism. Presented to ED for 1 week of malaise/fatigue, decreased PO intake and insomnia. CXR unremarkable, admitted to medicine stepdown 6/30/24 for hyponatremia of 117 and JEMIMA. Hypovolemic shock Acute hypovolemic hyponatremia secondary to poor intake complicated by acute kidney injury on top of chronic kidney disease 58 y/o woman w PMHx of CVA w residual L sided weakness and baseline bedbound vs wheelchair reliant, HTN, HLD, DM, CAD s/p stent, HFrEF 25%, prior ICD s/p removal due to infection, severe pulm HTN, COPD on 4L NC, CORNELIUS no CPAP, CKD, hypothyroidism. Presents to ED for 1 week of malaise/fatigue, decreased PO intake and insomnia. CXR unremarkable. Admitted to medicine for hyponatremia and JEMIMA.

## 2024-07-01 NOTE — CONSULT NOTE ADULT - SUBJECTIVE AND OBJECTIVE BOX
Patient is a 59y old  Female who presents with a chief complaint of wekaness (2024 18:54)    58 y/o woman w PMHx of HTN, HLD, DM, CAD s/p stent, HFrEF 25%, prior ICD s/p removal due to infection, severe pulm HTN,  COPD on 4L NC, CORNELIUS no CPAP, CKD baseline Cr 1.3, CVA w residual L sided weakness and baseline bedbound vs wheelchair reliant, hypothyroidism, presented to ED for 1 week of malaise/fatigue, decreased PO intake and insomnia, in ED found to have WBC 8.27, Na 117, K 3.3, Cr 2.3, lactate 1.2, , given 500cc NS, CXR unremarkable, admitted to medicine stepdown 24 for hyponatremia of 117 and JEMIMA.    Pt is a very brief historian - mostly asked for food and blanket during our conversation.   States she lives at home w HHA helping w food and other home needs, as pt is bed/wheelchair reliant. States over past week she was not eating because she couldn't sleep. When asked how those two things are related, patient said "I don't know" and when asked if she had appetite, then said no. Unable to find any reason pt suddenly had insomnia; denies any new meds, new supplements, changes in medication doses.   ROS: Denies any new symptoms aside from fatigue/malaise, insomnia, and loss of appetite. Denies any CP, SOB, changes in urination, n/v/d, abd pain, leg swelling. No recent travel or sick contacts. No fevers, chills.     Triage vitals were: 92/65, 74bpm, RR20, 100% on RA, 98.4  Repeat vitals notable for: 102/60, 67bpm, RR18, 98% on 4L   Labs notable for: WBC 8.27, Na 117, K 3.3, Cr 2.3, lactate 1.2, , UA(+)  Imaging: CXR unremarkable  Treatments: given 500cc NS  admitted to SDU called to evaluate    PAST MEDICAL & SURGICAL HISTORY:  Hypertension      Hyperlipidemia      Anxiety and depression      COPD, severe      CHF (congestive heart failure)      Cerebrovascular accident (CVA)  Multiple      Type 2 diabetes mellitus      CKD (chronic kidney disease), stage II      No significant past surgical history          SOCIAL HX:   Smoking ex    FAMILY HISTORY:  FH: diabetes mellitus (Father, Mother)        REVIEW OF SYSTEMS see hpi    Allergies    No Known Allergies    Intolerances        acetaminophen     Tablet .. 650 milliGRAM(s) Oral every 6 hours PRN  atorvastatin 80 milliGRAM(s) Oral at bedtime  calamine/zinc oxide Lotion 1 Application(s) Topical three times a day  clopidogrel Tablet 75 milliGRAM(s) Oral daily  dextrose 10% Bolus 125 milliLiter(s) IV Bolus once  dextrose 5%. 1000 milliLiter(s) IV Continuous <Continuous>  dextrose 5%. 1000 milliLiter(s) IV Continuous <Continuous>  dextrose 50% Injectable 12.5 Gram(s) IV Push once  dextrose 50% Injectable 25 Gram(s) IV Push once  dextrose Oral Gel 15 Gram(s) Oral once PRN  glucagon  Injectable 1 milliGRAM(s) IntraMuscular once  heparin   Injectable 5000 Unit(s) SubCutaneous every 8 hours  insulin lispro (ADMELOG) corrective regimen sliding scale   SubCutaneous three times a day before meals  insulin lispro (ADMELOG) corrective regimen sliding scale   SubCutaneous at bedtime  lactated ringers. 1000 milliLiter(s) IV Continuous <Continuous>  levothyroxine 25 MICROGram(s) Oral daily  melatonin 3 milliGRAM(s) Oral at bedtime PRN  norepinephrine Infusion 0.05 MICROgram(s)/kG/Min IV Continuous <Continuous>  : Home Meds:      PHYSICAL EXAM    ICU Vital Signs Last 24 Hrs  T(C): 36.9 (2024 20:37), Max: 37.1 (2024 11:50)  T(F): 98.5 (2024 20:37), Max: 98.8 (2024 11:50)  HR: 80 (2024 23:34) (60 - 88)  BP: 93/50 (2024 23:34) (92/65 - 102/60)  RR: 18 (2024 23:34) (18 - 20)  SpO2: 99% (2024 23:34) (98% - 100%)    O2 Parameters below as of 2024 23:34  Patient On (Oxygen Delivery Method): nasal cannula  O2 Flow (L/min): 2          General: ill looking  Lungs: dec bs both bases  Cardiovascular: anila 2,6  Abdomen: Soft, Positive BS  Extremities: No clubbing  Neurological: follows commands        LABS:                          10.0   8.27  )-----------( 216      ( 2024 12:20 )             27.6                                                   119<LL>  |  78<L>  |  39<H>  ----------------------------<  155<H>  3.0<L>   |  23  |  2.3<H>    Ca    8.8      2024 17:47  Mg     1.6         TPro  7.5  /  Alb  4.3  /  TBili  0.9  /  DBili  x   /  AST  23  /  ALT  17  /  AlkPhos  214<H>        PT/INR - ( 2024 12:20 )   PT: 10.70 sec;   INR: 0.94 ratio         PTT - ( 2024 12:20 )  PTT:31.7 sec                                       Urinalysis Basic - ( 2024 02:40 )    Color: Yellow / Appearance: Clear / S.005 / pH: x  Gluc: x / Ketone: Negative mg/dL  / Bili: Negative / Urobili: 0.2 mg/dL   Blood: x / Protein: Negative mg/dL / Nitrite: Negative   Leuk Esterase: Moderate / RBC: 0 /HPF /  /HPF   Sq Epi: x / Non Sq Epi: 0 /HPF / Bacteria: Moderate /HPF                                                  LIVER FUNCTIONS - ( 2024 12:20 )  Alb: 4.3 g/dL / Pro: 7.5 g/dL / ALK PHOS: 214 U/L / ALT: 17 U/L / AST: 23 U/L / GGT: x                                                  Urinalysis with Rflx Culture (collected 2024 11:32)                                                                                          MEDICATIONS  (STANDING):  atorvastatin 80 milliGRAM(s) Oral at bedtime  calamine/zinc oxide Lotion 1 Application(s) Topical three times a day  clopidogrel Tablet 75 milliGRAM(s) Oral daily  dextrose 10% Bolus 125 milliLiter(s) IV Bolus once  dextrose 5%. 1000 milliLiter(s) (50 mL/Hr) IV Continuous <Continuous>  dextrose 5%. 1000 milliLiter(s) (100 mL/Hr) IV Continuous <Continuous>  dextrose 50% Injectable 12.5 Gram(s) IV Push once  dextrose 50% Injectable 25 Gram(s) IV Push once  glucagon  Injectable 1 milliGRAM(s) IntraMuscular once  heparin   Injectable 5000 Unit(s) SubCutaneous every 8 hours  insulin lispro (ADMELOG) corrective regimen sliding scale   SubCutaneous three times a day before meals  insulin lispro (ADMELOG) corrective regimen sliding scale   SubCutaneous at bedtime  lactated ringers. 1000 milliLiter(s) (75 mL/Hr) IV Continuous <Continuous>  levothyroxine 25 MICROGram(s) Oral daily  norepinephrine Infusion 0.05 MICROgram(s)/kG/Min (8.44 mL/Hr) IV Continuous <Continuous>    MEDICATIONS  (PRN):  acetaminophen     Tablet .. 650 milliGRAM(s) Oral every 6 hours PRN Temp greater or equal to 38C (100.4F), Mild Pain (1 - 3)  dextrose Oral Gel 15 Gram(s) Oral once PRN Blood Glucose LESS THAN 70 milliGRAM(s)/deciliter  melatonin 3 milliGRAM(s) Oral at bedtime PRN Insomnia      cxr noted

## 2024-07-01 NOTE — CONSULT NOTE ADULT - CONSULT REASON
GOC
h/o CAD, HFrEF and Pulm HT   To assess of fluid status
Hyponatremia/ renal failure
Diffuse itching, Possibly ringworm?
Hyponatremia and JEMIMA

## 2024-07-01 NOTE — SWALLOW BEDSIDE ASSESSMENT ADULT - NS SPL SWALLOW CLINIC TRIAL FT
Toleration observed for regular solids with thin liquids without overt s/s of penetration/ aspiration.

## 2024-07-01 NOTE — CONSULT NOTE ADULT - ASSESSMENT
60 y/o woman w PMHx of HTN, HLD, DM, CAD s/p stent, HFrEF 30%, prior ICD s/p removal (2022) due to infection, severe pulm HTN, follows w Cardio NP Cyn Sotoanaly, COPD on 4L NC, CORNELIUS no CPAP, CKD baseline Cr 1.3, CVA w residual L sided weakness, hypothyroidism, presented to ED for 1 week of malaise/fatigue, decreased PO intake and insomnia. Admitted to medicine stepdown 6/30/24 for hyponatremia of 117 and JEMIMA. Cardiology team consulted to optimize medication.     IMPRESSION   #Low BP 2/2 hypovolemia, poor oral intake in the setting of cystitis         - s/p 2.5L IVF         - Lactate 1.2 at presentation         - On levophed 0.03  #h/o CAD s/p RCA stent(2021)        - No symptoms or signs of ACS, Negative ecg, trops NA   #HFrEF 30%        - No obvious signs of fluid overload, pt was hypovolumic at presentation        - s/p 2.5L IVF in ED for low BP    #prior ICD s/p removal (2022) due to infection  #Severe pulm HTN (WHO grp II/III)  #COPD on 4L NC, still smokes   #JEMIMA on CKD (baseline Cr 1.3)         - 2.3 at presentaion; 1.7 now   #h/o CVA w residual L sided weakness (wheelchair reliant)  #Hypothyroidism  #HTN  #DM/ DL    RECOMMENDATIONS    - Agree with holding anti-hypertensives and diuretics; HOLD  58 y/o woman w PMHx of HTN, HLD, DM, CAD s/p stent, HFrEF 30%, prior ICD s/p removal (2022) due to infection, severe pulm HTN, follows w Cardio NP Cynmonse Manzano, COPD on 4L NC, CORNELIUS no CPAP, CKD baseline Cr 1.3, CVA w residual L sided weakness, hypothyroidism, presented to ED for 1 week of malaise/fatigue, decreased PO intake and insomnia. Admitted to medicine stepdown 6/30/24 for hyponatremia of 117 and JEMIMA. Cardiology team consulted to optimize medication.     IMPRESSION   #Low BP 2/2 hypovolemia, poor oral intake in the setting of cystitis         - s/p 2.5L IVF in ED        - Lactate 1.2 at presentation         - On levophed 0.03  #h/o CAD s/p RCA stent(2021)        - No symptoms or signs of ACS, Negative ecg, trops NA   #HFrEF 30%        - No obvious signs of fluid overload, pt was hypovolumic at presentation        - Clear chest, No LE swelling, No JVD, Hepatojugular reflex +         - s/p 2.5L IVF and levophed ggt in ED  #prior ICD s/p removal (2022) due to infection  #Severe pulm HTN (WHO grp II/III)  #COPD on 4L NC, still smokes   #JEMIMA on CKD (baseline Cr 1.3)         - 2.3 at presentaion; 1.7 now   #h/o CVA w residual L sided weakness (wheelchair reliant)  #Hypothyroidism  #HTN  #DM/ DL    RECOMMENDATIONS    - Pt appears euvolumic on exam    - Agree with holding anti-hypertensives and diuretics; HOLD Entresto, metoprolol, spironolactone and torsemide   - c/w plavix, statins   - f/u TSH; No indication for TTE now    - Rest of care per primary team     INCOMPLETE NOTE  60 y/o woman w PMHx of HTN, HLD, DM, CAD s/p stent, HFrEF 30%, prior ICD s/p removal (2022) due to infection, severe pulm HTN, follows w Cardio NP Cyn Sotoanaly, COPD on 4L NC, CORNELIUS no CPAP, CKD baseline Cr 1.3, CVA w residual L sided weakness, hypothyroidism, presented to ED for 1 week of malaise/fatigue, decreased PO intake and insomnia. Admitted to medicine stepdown 6/30/24 for hyponatremia of 117 and JEMIMA. Cardiology team consulted to optimize medication.     IMPRESSION   #Hypovolumic Hyponatremia 2/2 poor oral intake and cystitis         - s/p 2.5L IVF in ED; Na 117 --->123; Serum and urine Osm - Low; urine Na <20.        - Lactate 1.2 at presentation        - On levophed 0.03  #h/o CAD s/p RCA stent(2021)        - No symptoms or signs of ACS, Negative ecg, trops NA   #HFrEF 30%        - No obvious signs of fluid overload, pt was hypovolumic at presentation        - Clear chest, No LE swelling, No JVD, Hepatojugular reflex +         - s/p 2.5L IVF and levophed ggt in ED  #prior ICD s/p removal (2022) due to infection  #Severe pulm HTN (WHO grp II/III)  #COPD on 4L NC, still smokes   #JEMIMA on CKD (baseline Cr 1.3)         - 2.3 at presentaion; 1.7 now   #h/o CVA w residual L sided weakness (wheelchair reliant)  #Hypothyroidism  #HTN  #DM/ DL    RECOMMENDATIONS    - Pt appears euvolumic on exam s/p 2.5 L IVF  - Agree with holding anti-hypertensives and diuretics; HOLD Entresto, metoprolol, spironolactone, farxiga and torsemide   - c/w plavix, statins   - f/u TSH; No indication for TTE now    - Rest of care per primary team     INCOMPLETE NOTE  58 y/o woman w PMHx of HTN, HLD, DM, CAD s/p stent, HFrEF 30%, prior ICD s/p removal (2022) due to infection, severe pulm HTN, follows w Cardio NP Cyn Jose Juan, COPD on 4L NC, CORNELIUS no CPAP, CKD baseline Cr 1.3, CVA w residual L sided weakness, hypothyroidism, presented to ED for 1 week of malaise/fatigue, decreased PO intake and insomnia. Admitted to medicine stepdown 6/30/24 for hyponatremia of 117 and JEMIMA. Cardiology team consulted to optimize medication.     IMPRESSION   #Hypovolumic Hyponatremia 2/2 poor oral intake and cystitis         - s/p 2.5L IVF in ED; Na 117 --->123; Serum and urine Osm - Low; urine Na <20.        - Lactate 1.2 at presentation        - On levophed 0.03  #h/o CAD s/p RCA stent(2021)        - No symptoms or signs of ACS, Negative ecg, trops NA   #HFrEF 30%        - No obvious signs of fluid overload, pt was hypovolumic at presentation        - Clear chest, No LE swelling, No JVD, Hepatojugular reflex +         - s/p 2.5L IVF and levophed ggt in ED  #prior ICD s/p removal (2022) due to infection  #Severe pulm HTN (WHO grp II/III)  #COPD on 4L NC, still smokes   #JEMIMA on CKD (baseline Cr 1.3)         - 2.3 at presentaion; 1.7 now   #h/o CVA w residual L sided weakness (wheelchair reliant)  #Hypothyroidism  #HTN  #DM/ DL    RECOMMENDATIONS    - Pt was hypovolumic at presentation, appears euvolumic now on exam s/p 2.5 L IVF  - Agree with holding anti-hypertensives and diuretics until JEMIMA and Low BP resolved; HOLD Entresto, metoprolol, spironolactone, farxiga and torsemide   - c/w plavix, statins   - f/u TSH; No indication for TTE now    - Rest of care per primary team     INCOMPLETE NOTE  60 y/o woman w PMHx of HTN, HLD, DM, CAD s/p stent, HFrEF 30%, prior ICD s/p removal (2022) due to infection, severe pulm HTN, follows w Cardio NP Cyn Manzano, COPD on 4L NC, CORNELIUS no CPAP, CKD baseline Cr 1.3, CVA w residual L sided weakness, hypothyroidism, presented to ED for 1 week of malaise/fatigue, decreased PO intake and insomnia. Admitted to medicine stepdown 6/30/24 for hyponatremia of 117 and JEMIMA. Cardiology team consulted to optimize medication.     IMPRESSION   #Hypovolumic Hyponatremia 2/2 poor oral intake and cystitis         - s/p 2.5L IVF in ED; Na 117 --->123; Serum and urine Osm - Low; urine Na <20.        - Lactate 1.2 at presentation        - On levophed 0.03  #h/o CAD s/p RCA stent(2021)        - No symptoms or signs of ACS, Negative ecg, trops NA   #HFrEF 30%        - No obvious signs of fluid overload, pt was hypovolumic at presentation        - Clear chest, No LE swelling, No JVD, Hepatojugular reflex +         - s/p 2.5L IVF and levophed ggt in ED  #prior ICD s/p removal (2022) due to infection  #Severe pulm HTN (WHO grp II/III)  #COPD on 4L NC, still smokes   #JEMIMA on CKD (baseline Cr 1.3)         - 2.3 at presentation 1.7 now   #h/o CVA w residual L sided weakness (wheelchair reliant)  #Hypothyroidism  #HTN  #DM/ DL    RECOMMENDATIONS    - Pt was hypovolumic at presentation, appears euvolemic now on exam s/p 2.5 L IVF  - Agree with holding anti-hypertensives and diuretics until JEMIMA and Low BP resolved; HOLD Entresto, metoprolol, spironolactone, farxiga and torsemide   - c/w Plavix statins   - f/u TSH; No indication for TTE now    - Rest of care per primary team     INCOMPLETE NOTE  58 y/o woman w PMHx of HTN, HLD, DM, CAD s/p stent, HFrEF 30%, prior ICD s/p removal (2022) due to infection, severe pulm HTN, follows w Cardio NP Cyn Manzano, COPD on 4L NC, CORNELIUS no CPAP, CKD baseline Cr 1.3, CVA w residual L sided weakness, hypothyroidism, presented to ED for 1 week of malaise/fatigue, decreased PO intake and insomnia. Admitted to medicine stepdown 6/30/24 for hyponatremia of 117 and JEMIMA. Cardiology team consulted to optimize medication.     IMPRESSION   #Hypovolumic Hyponatremia 2/2 poor oral intake and cystitis         - s/p 2.5L IVF in ED; Na 117 --->123; Serum and urine Osm - Low; urine Na <20.        - Lactate 1.2 at presentation        - On levophed 0.03  #h/o CAD s/p RCA stent(2021)        - No symptoms or signs of ACS, Negative ecg, trops NA   #HFrEF 30%        - No obvious signs of fluid overload, pt was hypovolumic at presentation        - Clear chest, No LE swelling, No JVD, Hepatojugular reflex +         - s/p 2.5L IVF and levophed ggt in ED  #prior ICD s/p removal (2022) due to infection  #Severe pulm HTN (WHO grp II/III)  #COPD on 4L NC, still smokes   #JEMIMA on CKD (baseline Cr 1.3)         - 2.3 at presentation 1.7 now   #h/o CVA w residual L sided weakness (wheelchair reliant)  #Hypothyroidism  #HTN  #DM/ DL    RECOMMENDATIONS    - Pt was hypovolumic at presentation, appears euvolemic now on exam s/p 2.5 L IVF  - Agree with holding anti-hypertensives and diuretics until JEMIMA and Low BP resolved; HOLD Entresto, metoprolol, spironolactone, farxiga and torsemide   - c/w Plavix statins   - f/u TSH; No indication for TTE now    - Pt will be admitted to cardio stepdown for medical optimization.    60 y/o woman w PMHx of HTN, HLD, DM, CAD s/p stent, HFrEF 30%, prior ICD s/p removal (2022) due to infection, severe pulm HTN, follows w Cardio NP Cyn Manzano, COPD on 4L NC, CORNELIUS no CPAP, CKD baseline Cr 1.3, CVA w residual L sided weakness, hypothyroidism, presented to ED for 1 week of malaise/fatigue, decreased PO intake and insomnia. Admitted to medicine stepdown 6/30/24 for hyponatremia of 117 and JEMIMA. Cardiology team consulted to optimize medication.     IMPRESSION   #Hypovolumic Hyponatremia 2/2 poor oral intake and cystitis         - s/p 2.5L IVF in ED; Na 117 --->126;         - Serum and urine Osm - Low; urine Na <20.        - Lactate 1.2 at presentation        - On levophed 0.03  #h/o CAD s/p RCA stent(2021)        - No symptoms or signs of ACS, Negative ecg, trops NA   #HFrEF 30%        - No obvious signs of fluid overload, pt was hypovolumic at presentation        - Clear chest, No LE swelling, No JVD, Hepatojugular reflex +         - s/p 2.5L IVF and levophed ggt in ED  #prior ICD s/p removal (2022) due to infection  #Severe pulm HTN (WHO grp II/III)  #COPD on 4L NC, still smokes   #JEMIMA on CKD (baseline Cr 1.3)         - 2.3 at presentation 1.7 now   #h/o CVA w residual L sided weakness (wheelchair reliant)  #Hypothyroidism  #HTN  #DM/ DL    RECOMMENDATIONS    - Pt was hypovolumic at presentation, appears euvolemic now on exam s/p 2.5 L IVF  - Agree with holding anti-hypertensives and diuretics until JEMIMA and Low BP resolved; HOLD Entresto, metoprolol, spironolactone, farxiga and torsemide   - c/w Plavix and statins  - f/u TSH; No indication for TTE now    - Plan to reintroduce GDMT once pt's JEMIMA and hypotension resolves  - Oral diet as tolerated  - Pt will be admitted to cardio stepdown for medical optimization.

## 2024-07-01 NOTE — CONSULT NOTE ADULT - SUBJECTIVE AND OBJECTIVE BOX
HPI  58 y/o woman w PMHx of HTN, HLD, DM, CAD s/p stent, HFrEF 25%, prior ICD s/p removal due to infection, severe pulm HTN, follows w Cardio NP Cyn Manzano, COPD on 4L NC, CORNELIUS no CPAP, CKD baseline Cr 1.3, CVA w residual L sided weakness and baseline bedbound vs wheelchair reliant, hypothyroidism, presented to ED for 1 week of malaise/fatigue, decreased PO intake and insomnia, in ED found to have WBC 8.27, Na 117, K 3.3, Cr 2.3, lactate 1.2, , given 500cc NS, CXR unremarkable, admitted to medicine stepdown 6/30/24 for hyponatremia of 117 and JEMIMA.  HCP brother Pérez Ramos whose phone number she cannot recall     Pt is a very brief historian - mostly asked for food and blanket during our conversation.   States she lives at home w HHA helping w food and other home needs, as pt is bed/wheelchair reliant. States over past week she was not eating because she couldn't sleep. When asked how those two things are related, patient said "I don't know" and when asked if she had appetite, then said no. Unable to find any reason pt suddenly had insomnia; denies any new meds, new supplements, changes in medication doses.   ROS: Denies any new symptoms aside from fatigue/malaise, insomnia, and loss of appetite. Denies any CP, SOB, changes in urination, n/v/d, abd pain, leg swelling. No recent travel or sick contacts. No fevers, chills.     Triage vitals were: 92/65, 74bpm, RR20, 100% on RA, 98.4  Repeat vitals notable for: 102/60, 67bpm, RR18, 98% on 4L   Labs notable for: WBC 8.27, Na 117, K 3.3, Cr 2.3, lactate 1.2, , UA(+)  Imaging: CXR unremarkable  Treatments: given 500cc NS  Pending: BNP, repeat BMP (30 Jun 2024 18:54)    CARDIOLOGY CONSULT  HPI consistent with the above. Cardiology team is consulted as patient has extensive cardiac history to assess for fluid status. Pt was brought tp ED for weakness and decreased appetite. Pt is not       PAST MEDICAL & SURGICAL HISTORY  Hypertension    Hyperlipidemia    Anxiety and depression    COPD, severe    CHF (congestive heart failure)    Cerebrovascular accident (CVA)  Multiple    Type 2 diabetes mellitus    CKD (chronic kidney disease), stage II    No significant past surgical history        FAMILY HISTORY  FAMILY HISTORY:  FH: diabetes mellitus (Father, Mother)        SOCIAL HISTORY  []smoker  []Alcohol  []Drug    ALLERGIES  No Known Allergies      MEDICATIONS:  MEDICATIONS  (STANDING):  atorvastatin 80 milliGRAM(s) Oral at bedtime  calamine/zinc oxide Lotion 1 Application(s) Topical three times a day  cefTRIAXone   IVPB 1000 milliGRAM(s) IV Intermittent every 24 hours  clopidogrel Tablet 75 milliGRAM(s) Oral daily  dextrose 10% Bolus 125 milliLiter(s) IV Bolus once  dextrose 5%. 1000 milliLiter(s) (50 mL/Hr) IV Continuous <Continuous>  dextrose 5%. 1000 milliLiter(s) (100 mL/Hr) IV Continuous <Continuous>  dextrose 50% Injectable 12.5 Gram(s) IV Push once  dextrose 50% Injectable 25 Gram(s) IV Push once  glucagon  Injectable 1 milliGRAM(s) IntraMuscular once  heparin   Injectable 5000 Unit(s) SubCutaneous every 8 hours  insulin lispro (ADMELOG) corrective regimen sliding scale   SubCutaneous three times a day before meals  insulin lispro (ADMELOG) corrective regimen sliding scale   SubCutaneous at bedtime  levothyroxine 25 MICROGram(s) Oral daily  norepinephrine Infusion 0.05 MICROgram(s)/kG/Min (8.44 mL/Hr) IV Continuous <Continuous>  sodium chloride 0.45%. 1000 milliLiter(s) (100 mL/Hr) IV Continuous <Continuous>    MEDICATIONS  (PRN):  acetaminophen     Tablet .. 650 milliGRAM(s) Oral every 6 hours PRN Temp greater or equal to 38C (100.4F), Mild Pain (1 - 3)  dextrose Oral Gel 15 Gram(s) Oral once PRN Blood Glucose LESS THAN 70 milliGRAM(s)/deciliter  melatonin 3 milliGRAM(s) Oral at bedtime PRN Insomnia      HOME MEDICATIONS  Home Medications:  Farxiga 10 mg oral tablet: 1 tab(s) orally once a day (13 Nov 2023 14:27)  metoprolol succinate 50 mg oral capsule, extended release: 1 cap(s) orally once a day (16 Nov 2023 18:13)  Plavix 75 mg oral tablet: 1 tab(s) orally once a day (16 Nov 2023 18:16)  Synthroid 25 mcg (0.025 mg) oral tablet: 1 tab(s) orally once a day (13 Nov 2023 14:27)  torsemide 20 mg oral tablet: 1 tab(s) orally 2 times a day (30 Jun 2024 18:50)      VITALS   T(F): 98.1 (07-01 @ 07:22), Max: 98.8 (06-30 @ 11:50)  HR: 78 (07-01 @ 07:22) (60 - 100)  BP: 105/50 (07-01 @ 07:22) (65/43 - 135/63)  BP(mean): 91 (07-01 @ 06:10) (45 - 91)  RR: 16 (07-01 @ 07:22) (16 - 20)  SpO2: 98% (07-01 @ 07:22) (98% - 100%)    I&O's Summary      REVIEW OF SYSTEMS  CONSTITUTIONAL: No weakness, fevers or chills  EYES: No visual changes  ENT: No vertigo or throat pain   NECK: No pain or stiffness  RESPIRATORY: No cough, wheezing, hemoptysis; No shortness of breath  CARDIOVASCULAR: No chest pain or palpitations  GASTROINTESTINAL: No abdominal or epigastric pain. No nausea, vomiting, or hematemesis; No diarrhea or constipation. No melena or hematochezia.  GENITOURINARY: No dysuria, frequency or hematuria  NEUROLOGICAL: No numbness or weakness  SKIN: No itching, no rashes  MSK: No pain    PHYSICAL EXAM  NEURO: patient is awake , alert and oriented  GEN: Not in acute distress  NECK: no thyroid enlargement, no JVD  LUNGS: Clear to auscultation bilaterally   CARDIOVASCULAR: S1/S2 present, RRR , no murmurs or rubs, no carotid bruits,  + PP bilaterally  ABD: Soft, non-tender, non-distended, +BS  EXT: No LILIA  SKIN: Intact    LABS:                        9.3    8.70  )-----------( 198      ( 01 Jul 2024 05:46 )             26.9     07-01    126<L>  |  88<L>  |  35<H>  ----------------------------<  156<H>  3.4<L>   |  22  |  1.7<H>    Ca    8.7      01 Jul 2024 05:46  Phos  2.2     07-01  Mg     2.2     07-01    TPro  6.3  /  Alb  3.6  /  TBili  0.8  /  DBili  x   /  AST  15  /  ALT  13  /  AlkPhos  177<H>  07-01    PT/INR - ( 30 Jun 2024 12:20 )   PT: 10.70 sec;   INR: 0.94 ratio         PTT - ( 30 Jun 2024 12:20 )  PTT:31.7 sec    Troponin Trend:            RADIOLOGY  -CXR:  -TTE:  -CCTA:  -STRESS TEST:  -CATHETERIZATION:    ECG    TELEMETRY EVENTS   HPI  58 y/o woman w PMHx of HTN, HLD, DM, CAD s/p stent, HFrEF 25%, prior ICD s/p removal due to infection, severe pulm HTN, follows w Cardio NP Cyn Manzano, COPD on 4L NC, CORNELIUS no CPAP, CKD baseline Cr 1.3, CVA w residual L sided weakness and baseline bedbound vs wheelchair reliant, hypothyroidism, presented to ED for 1 week of malaise/fatigue, decreased PO intake and insomnia, in ED found to have WBC 8.27, Na 117, K 3.3, Cr 2.3, lactate 1.2, , given 500cc NS, CXR unremarkable, admitted to medicine stepdown 6/30/24 for hyponatremia of 117 and JEMIMA.  HCP brother Pérez Ramos whose phone number she cannot recall     Pt is a very brief historian - mostly asked for food and blanket during our conversation.   States she lives at home w HHA helping w food and other home needs, as pt is bed/wheelchair reliant. States over past week she was not eating because she couldn't sleep. When asked how those two things are related, patient said "I don't know" and when asked if she had appetite, then said no. Unable to find any reason pt suddenly had insomnia; denies any new meds, new supplements, changes in medication doses.   ROS: Denies any new symptoms aside from fatigue/malaise, insomnia, and loss of appetite. Denies any CP, SOB, changes in urination, n/v/d, abd pain, leg swelling. No recent travel or sick contacts. No fevers, chills.     Triage vitals were: 92/65, 74bpm, RR20, 100% on RA, 98.4  Repeat vitals notable for: 102/60, 67bpm, RR18, 98% on 4L   Labs notable for: WBC 8.27, Na 117, K 3.3, Cr 2.3, lactate 1.2, , UA(+)  Imaging: CXR unremarkable  Treatments: given 500cc NS  Pending: BNP, repeat BMP (30 Jun 2024 18:54)    CARDIOLOGY CONSULT  HPI consistent with the above. Cardiology team is consulted as patient has extensive cardiac history to assess for fluid status. Pt was brought to ED for weakness and decreased appetite. Pt doesn't remember the alst time she saw a cardiologist or her PCP. She says she was complaint with meds at home, doesn't remember all the names. She denies chest pain, shortness of breath, palpitations, fever, chills, N/V, lightheadedness or LE swelling.       PAST MEDICAL & SURGICAL HISTORY  Hypertension  Hyperlipidemia  Anxiety and depression  COPD, severe  CHF (congestive heart failure)  Cerebrovascular accident (CVA)  Multiple  Type 2 diabetes mellitus  CKD (chronic kidney disease), stage II  No significant past surgical history      FAMILY HISTORY  FAMILY HISTORY:  FH: diabetes mellitus (Father, Mother)    SOCIAL HISTORY  Active smoker  []Alcohol  []Drug    ALLERGIES  No Known Allergies      MEDICATIONS:  MEDICATIONS  (STANDING):  atorvastatin 80 milliGRAM(s) Oral at bedtime  calamine/zinc oxide Lotion 1 Application(s) Topical three times a day  cefTRIAXone   IVPB 1000 milliGRAM(s) IV Intermittent every 24 hours  clopidogrel Tablet 75 milliGRAM(s) Oral daily  dextrose 10% Bolus 125 milliLiter(s) IV Bolus once  dextrose 5%. 1000 milliLiter(s) (50 mL/Hr) IV Continuous <Continuous>  dextrose 5%. 1000 milliLiter(s) (100 mL/Hr) IV Continuous <Continuous>  dextrose 50% Injectable 12.5 Gram(s) IV Push once  dextrose 50% Injectable 25 Gram(s) IV Push once  glucagon  Injectable 1 milliGRAM(s) IntraMuscular once  heparin   Injectable 5000 Unit(s) SubCutaneous every 8 hours  insulin lispro (ADMELOG) corrective regimen sliding scale   SubCutaneous three times a day before meals  insulin lispro (ADMELOG) corrective regimen sliding scale   SubCutaneous at bedtime  levothyroxine 25 MICROGram(s) Oral daily  norepinephrine Infusion 0.05 MICROgram(s)/kG/Min (8.44 mL/Hr) IV Continuous <Continuous>  sodium chloride 0.45%. 1000 milliLiter(s) (100 mL/Hr) IV Continuous <Continuous>    MEDICATIONS  (PRN):  acetaminophen     Tablet .. 650 milliGRAM(s) Oral every 6 hours PRN Temp greater or equal to 38C (100.4F), Mild Pain (1 - 3)  dextrose Oral Gel 15 Gram(s) Oral once PRN Blood Glucose LESS THAN 70 milliGRAM(s)/deciliter  melatonin 3 milliGRAM(s) Oral at bedtime PRN Insomnia      HOME MEDICATIONS  Home Medications:  Farxiga 10 mg oral tablet: 1 tab(s) orally once a day (13 Nov 2023 14:27)  metoprolol succinate 50 mg oral capsule, extended release: 1 cap(s) orally once a day (16 Nov 2023 18:13)  Plavix 75 mg oral tablet: 1 tab(s) orally once a day (16 Nov 2023 18:16)  Synthroid 25 mcg (0.025 mg) oral tablet: 1 tab(s) orally once a day (13 Nov 2023 14:27)  torsemide 20 mg oral tablet: 1 tab(s) orally 2 times a day (30 Jun 2024 18:50)      VITALS   T(F): 98.1 (07-01 @ 07:22), Max: 98.8 (06-30 @ 11:50)  HR: 78 (07-01 @ 07:22) (60 - 100)  BP: 105/50 (07-01 @ 07:22) (65/43 - 135/63)  BP(mean): 91 (07-01 @ 06:10) (45 - 91)  RR: 16 (07-01 @ 07:22) (16 - 20)  SpO2: 98% (07-01 @ 07:22) (98% - 100%)    I&O's Summary      REVIEW OF SYSTEMS  CONSTITUTIONAL: No weakness, fevers or chills  EYES: No visual changes  ENT: No vertigo or throat pain   NECK: No pain or stiffness  RESPIRATORY: No cough, wheezing, hemoptysis; No shortness of breath  CARDIOVASCULAR: No chest pain or palpitations  GASTROINTESTINAL: No abdominal or epigastric pain. No nausea, vomiting, or hematemesis; No diarrhea or constipation. No melena or hematochezia.  GENITOURINARY: No dysuria, frequency or hematuria  NEUROLOGICAL: No numbness or weakness  SKIN: No itching, no rashes  MSK: No pain    PHYSICAL EXAM  NEURO: patient is awake , alert and oriented  GEN: Not in acute distress  NECK: no thyroid enlargement, no JVD  LUNGS: Clear to auscultation bilaterally   CARDIOVASCULAR: S1/S2 present, RRR , no murmurs or rubs, no carotid bruits,  + PP bilaterally  ABD: Soft, non-tender, non-distended, +BS  EXT: No LILIA  SKIN: Intact    LABS:                        9.3    8.70  )-----------( 198      ( 01 Jul 2024 05:46 )             26.9     07-01    126<L>  |  88<L>  |  35<H>  ----------------------------<  156<H>  3.4<L>   |  22  |  1.7<H>    Ca    8.7      01 Jul 2024 05:46  Phos  2.2     07-01  Mg     2.2     07-01    TPro  6.3  /  Alb  3.6  /  TBili  0.8  /  DBili  x   /  AST  15  /  ALT  13  /  AlkPhos  177<H>  07-01    PT/INR - ( 30 Jun 2024 12:20 )   PT: 10.70 sec;   INR: 0.94 ratio         PTT - ( 30 Jun 2024 12:20 )  PTT:31.7 sec    Troponin Trend: NA  Pro  (33k in Nov 2023)        RADIOLOGY  -CXR: Clear B/L   -TTE (Nov 2023): Severely decreased LVEF - 25-30%. Septum is dyskinetic/flattened during diastole. Moderate (grade II) diastolic   dysfunction; Moderate-severe tricuspid regurgitation with hepatic vein flow reversal. Severe pulmonary hypertension (PASP is at least 60mmHg).    -CATHETERIZATION: RCA disease - RICARDO x 1    ECG  NSR, LBBB with Right axis deviation; no ischemia changes   HPI  58 y/o woman w PMHx of HTN, HLD, DM, CAD s/p stent, HFrEF 25%, prior ICD s/p removal due to infection, severe pulm HTN, follows w Cardio NP Cyn Manzano, COPD on 4L NC, CORNELIUS no CPAP, CKD baseline Cr 1.3, CVA w residual L sided weakness and baseline bedbound vs wheelchair reliant, hypothyroidism, presented to ED for 1 week of malaise/fatigue, decreased PO intake and insomnia, in ED found to have WBC 8.27, Na 117, K 3.3, Cr 2.3, lactate 1.2, , given 500cc NS, CXR unremarkable, admitted to medicine stepdown 6/30/24 for hyponatremia of 117 and JEMIMA.  HCP brother Pérez Ramos whose phone number she cannot recall     Pt is a very brief historian - mostly asked for food and blanket during our conversation.   States she lives at home w HHA helping w food and other home needs, as pt is bed/wheelchair reliant. States over past week she was not eating because she couldn't sleep. When asked how those two things are related, patient said "I don't know" and when asked if she had appetite, then said no. Unable to find any reason pt suddenly had insomnia; denies any new meds, new supplements, changes in medication doses.   ROS: Denies any new symptoms aside from fatigue/malaise, insomnia, and loss of appetite. Denies any CP, SOB, changes in urination, n/v/d, abd pain, leg swelling. No recent travel or sick contacts. No fevers, chills.     Triage vitals were: 92/65, 74bpm, RR20, 100% on RA, 98.4  Repeat vitals notable for: 102/60, 67bpm, RR18, 98% on 4L   Labs notable for: WBC 8.27, Na 117, K 3.3, Cr 2.3, lactate 1.2, , UA(+)  Imaging: CXR unremarkable  Treatments: given 500cc NS  Pending: BNP, repeat BMP (30 Jun 2024 18:54)    CARDIOLOGY CONSULT  HPI consistent with the above. Cardiology team is consulted as patient has extensive cardiac history to assess for fluid status. Pt was brought to ED for weakness and decreased appetite. Pt doesn't remember the alst time she saw a cardiologist or her PCP. She says she was complaint with meds at home, doesn't remember all the names. She denies chest pain, shortness of breath, palpitations, fever, chills, N/V, lightheadedness or LE swelling.       PAST MEDICAL & SURGICAL HISTORY  Hypertension  Hyperlipidemia  Anxiety and depression  COPD, severe  CHF (congestive heart failure)  Cerebrovascular accident (CVA)  Multiple  Type 2 diabetes mellitus  CKD (chronic kidney disease), stage II  No significant past surgical history      FAMILY HISTORY  FAMILY HISTORY:  FH: diabetes mellitus (Father, Mother)    SOCIAL HISTORY  Active smoker  []Alcohol  []Drug    ALLERGIES  No Known Allergies      MEDICATIONS:  MEDICATIONS  (STANDING):  atorvastatin 80 milliGRAM(s) Oral at bedtime  calamine/zinc oxide Lotion 1 Application(s) Topical three times a day  cefTRIAXone   IVPB 1000 milliGRAM(s) IV Intermittent every 24 hours  clopidogrel Tablet 75 milliGRAM(s) Oral daily  dextrose 10% Bolus 125 milliLiter(s) IV Bolus once  dextrose 5%. 1000 milliLiter(s) (50 mL/Hr) IV Continuous <Continuous>  dextrose 5%. 1000 milliLiter(s) (100 mL/Hr) IV Continuous <Continuous>  dextrose 50% Injectable 12.5 Gram(s) IV Push once  dextrose 50% Injectable 25 Gram(s) IV Push once  glucagon  Injectable 1 milliGRAM(s) IntraMuscular once  heparin   Injectable 5000 Unit(s) SubCutaneous every 8 hours  insulin lispro (ADMELOG) corrective regimen sliding scale   SubCutaneous three times a day before meals  insulin lispro (ADMELOG) corrective regimen sliding scale   SubCutaneous at bedtime  levothyroxine 25 MICROGram(s) Oral daily  norepinephrine Infusion 0.05 MICROgram(s)/kG/Min (8.44 mL/Hr) IV Continuous <Continuous>  sodium chloride 0.45%. 1000 milliLiter(s) (100 mL/Hr) IV Continuous <Continuous>    MEDICATIONS  (PRN):  acetaminophen     Tablet .. 650 milliGRAM(s) Oral every 6 hours PRN Temp greater or equal to 38C (100.4F), Mild Pain (1 - 3)  dextrose Oral Gel 15 Gram(s) Oral once PRN Blood Glucose LESS THAN 70 milliGRAM(s)/deciliter  melatonin 3 milliGRAM(s) Oral at bedtime PRN Insomnia      HOME MEDICATIONS  Home Medications:  Farxiga 10 mg oral tablet: 1 tab(s) orally once a day (13 Nov 2023 14:27)  metoprolol succinate 50 mg oral capsule, extended release: 1 cap(s) orally once a day (16 Nov 2023 18:13)  Plavix 75 mg oral tablet: 1 tab(s) orally once a day (16 Nov 2023 18:16)  Synthroid 25 mcg (0.025 mg) oral tablet: 1 tab(s) orally once a day (13 Nov 2023 14:27)  torsemide 20 mg oral tablet: 1 tab(s) orally 2 times a day (30 Jun 2024 18:50)      VITALS   T(F): 98.1 (07-01 @ 07:22), Max: 98.8 (06-30 @ 11:50)  HR: 78 (07-01 @ 07:22) (60 - 100)  BP: 105/50 (07-01 @ 07:22) (65/43 - 135/63)  BP(mean): 91 (07-01 @ 06:10) (45 - 91)  RR: 16 (07-01 @ 07:22) (16 - 20)  SpO2: 98% (07-01 @ 07:22) (98% - 100%)      REVIEW OF SYSTEMS  CONSTITUTIONAL: No weakness, fevers or chills  EYES: No visual changes  ENT: No vertigo or throat pain   NECK: No pain or stiffness  RESPIRATORY: No cough, wheezing, hemoptysis; No shortness of breath  CARDIOVASCULAR: No chest pain or palpitations  GASTROINTESTINAL: No abdominal or epigastric pain. No nausea, vomiting, or hematemesis; No diarrhea or constipation. No melena or hematochezia.  GENITOURINARY: No dysuria, frequency or hematuria  NEUROLOGICAL: No numbness or weakness  SKIN: No itching, no rashes  MSK: No pain    PHYSICAL EXAM  NEURO: patient is awake , alert and oriented  GEN: Not in acute distress  NECK: no thyroid enlargement, no JVD  LUNGS: Clear to auscultation bilaterally   CARDIOVASCULAR: S1/S2 present, RRR , no murmurs or rubs, no carotid bruits,  + PP bilaterally  ABD: Soft, non-tender, non-distended, +BS  EXT: No LILIA  SKIN: Intact    LABS:                        9.3    8.70  )-----------( 198      ( 01 Jul 2024 05:46 )             26.9     07-01    126<L>  |  88<L>  |  35<H>  ----------------------------<  156<H>  3.4<L>   |  22  |  1.7<H>    Ca    8.7      01 Jul 2024 05:46  Phos  2.2     07-01  Mg     2.2     07-01    TPro  6.3  /  Alb  3.6  /  TBili  0.8  /  DBili  x   /  AST  15  /  ALT  13  /  AlkPhos  177<H>  07-01    PT/INR - ( 30 Jun 2024 12:20 )   PT: 10.70 sec;   INR: 0.94 ratio         PTT - ( 30 Jun 2024 12:20 )  PTT:31.7 sec    Troponin Trend: NA  Pro  (33k in Nov 2023)        RADIOLOGY  -CXR: Clear B/L   -TTE (Nov 2023): Severely decreased LVEF - 25-30%. Septum is dyskinetic/flattened during diastole. Moderate (grade II) diastolic   dysfunction; Moderate-severe tricuspid regurgitation with hepatic vein flow reversal. Severe pulmonary hypertension (PASP is at least 60mmHg).    -CATHETERIZATION: RCA disease - RICARDO x 1    ECG  NSR, LBBB with Right axis deviation; no ischemia changes

## 2024-07-01 NOTE — CONSULT NOTE ADULT - ASSESSMENT
60 y/o woman w PMHx of HTN, HLD, DM, CAD s/p stent, HFrEF 25%, prior ICD s/p removal due to infection, severe pulm HTN, follows w Cardio NP Cyn Manzano, COPD on 4L NC, CORNELIUS no CPAP, CKD baseline Cr 1.3, CVA w residual L sided weakness and baseline bedbound vs wheelchair reliant, hypothyroidism, presented to ED for 1 week of malaise/fatigue, decreased PO intake and insomnia, in ED found to have WBC 8.27, Na 117, K 3.3, Cr 2.3, lactate 1.2, , given 500cc NS, CXR unremarkable, admitted to medicine stepdown 6/30/24 for hyponatremia of 117 and JEMIMA.    #Hyponatremia  #Hypokalemia   #Loss of appetite   #JEMIMA on CKD   DDx most consistent w hypovolemic hyponatremia 2/2 poor PO intake, though unclear why pt has poor PO intake.  - s/p IVF and electrolyte replacement   - work up as per primary team    #(+)UA   - Given ctx x1 dose in ED   - asymptomatic, monitor off abx     #CAD s/p stent  #HFrEF 25%  #prior ICD s/p removal due to infection  #severe pulm HTN  follows w Cardio NP Cyn Manzano  - Dapagliflozin and metoprolol continued; other meds held due to JEMIMA  - Cardio consulted   - Fluid restrict, daily weight, strict I/Os     #COPD on 4L NC - on supplemental O2 here, no wheezing   #CORNELIUS no CPAP - pt denies CPAP but outpatient notes document CPAP (?)     #Goals of care/advance care planning  - Palliative performance scale score:  - Prognosis:   - Code status:  - GOC:   - HCP: pt's brother Pérez Ramos whose phone number she cannot recall   - Psychosocial support provided    Appreciate consult. Discussed with the primary team.    Doris Benson MD, [facility]                                                                           --------------------------------------------------------     60 y/o woman w PMHx of HTN, HLD, DM, CAD s/p stent, HFrEF 25%, prior ICD s/p removal due to infection, severe pulm HTN, follows w Cardio NP Cyn Manzano, COPD on 4L NC, CORNELIUS no CPAP, CKD baseline Cr 1.3, CVA w residual L sided weakness and baseline bedbound vs wheelchair reliant, hypothyroidism, presented to ED for 1 week of malaise/fatigue, decreased PO intake and insomnia, in ED found to have WBC 8.27, Na 117, K 3.3, Cr 2.3, lactate 1.2, , given 500cc NS, CXR unremarkable, admitted to medicine stepdown 6/30/24 for hyponatremia of 117 and JEMIMA.    #Hyponatremia  #Hypokalemia   #Loss of appetite   #JEMIMA on CKD   DDx most consistent w hypovolemic hyponatremia 2/2 poor PO intake, though unclear why pt has poor PO intake.  - s/p IVF and electrolyte replacement   - work up as per primary team    #(+)UA   - Given ctx x1 dose in ED   - asymptomatic, monitor off abx     #CAD s/p stent  #HFrEF 25%  #prior ICD s/p removal due to infection  #severe pulm HTN  follows w Cardio NP Cyn Manzano  - Dapagliflozin and metoprolol continued; other meds held due to JEMIMA  - Cardio consulted   - Fluid restrict, daily weight, strict I/Os     #COPD on 4L NC - on supplemental O2 here, no wheezing   #CORNELIUS no CPAP - pt denies CPAP but outpatient notes document CPAP (?)     #Goals of care/advance care planning  - Palliative performance scale score:  - Prognosis:   - Code status:  - GOC: will discuss when mental status is improved. Appears to be poor historian. Will attempt to reach pt's brother.   - HCP: pt's brother Pérez Ramos whose phone number she cannot recall. Writer found the following information on a past hospitalization -> Pérez Richard [phone]  - Psychosocial support provided    Appreciate consult. Discussed with the primary team.    Doris Benson MD, [facility]                                                                           --------------------------------------------------------     60 y/o woman w PMHx of HTN, HLD, DM, CAD s/p stent, HFrEF 25%, prior ICD s/p removal due to infection, severe pulm HTN, follows w Cardio NP Cyn Manzano, COPD on 4L NC, CORNELIUS no CPAP, CKD baseline Cr 1.3, CVA w residual L sided weakness and baseline bedbound vs wheelchair reliant, hypothyroidism, presented to ED for 1 week of malaise/fatigue, decreased PO intake and insomnia, in ED found to have WBC 8.27, Na 117, K 3.3, Cr 2.3, lactate 1.2, , given 500cc NS, CXR unremarkable, admitted to medicine stepdown 6/30/24 for hyponatremia of 117 and JEMIMA.    #Hyponatremia  #Hypokalemia   #Loss of appetite   #JEMIMA on CKD   DDx most consistent w hypovolemic hyponatremia 2/2 poor PO intake, though unclear why pt has poor PO intake.  - s/p IVF and electrolyte replacement   - work up as per primary team    #(+)UA   - Given ctx x1 dose in ED   - asymptomatic, monitor off abx     #CAD s/p stent  #HFrEF 25%  #prior ICD s/p removal due to infection  #severe pulm HTN  follows w Cardio NP Cyn Manzano  - Dapagliflozin and metoprolol continued; other meds held due to JEMIMA  - Cardio consulted   - Fluid restrict, daily weight, strict I/Os     #COPD on 4L NC - on supplemental O2 here, no wheezing   #CORNELIUS no CPAP - pt denies CPAP but outpatient notes document CPAP (?)     #Goals of care/advance care planning  - Palliative performance scale score:  - GOC: will discuss when mental status is improved. Appears to be poor historian. Will attempt to reach pt's brother.   - HCP: pt's brother Pérez Ramos whose phone number she cannot recall. Writer found the following information on a past hospitalization -> Pérez Ramos [phone]  - Psychosocial support provided    Appreciate consult. Discussed with the primary team.    Doris Benson MD, [facility]

## 2024-07-01 NOTE — CONSULT NOTE ADULT - ASSESSMENT
IMPRESSION:    Severe hyponatremia  Acute on chronic renal failure ( was on aldactone/ torsemide)  Hypotension  Hypokalemia  HO HFrEF  HO pul HTN  HO COPD on home oxygen  HO CVA      PLAN:    CNS: Avoid CNS depressant    HEENT:  Oral care    PULMONARY:  HOB @ 45 degrees, on NC keep SaO2 92 TO 96%, Neb as needed, OP inhalers    CARDIOVASCULAR: Hold diuretics, BP meds, taper levophed, NS 50 CC/H, cardio eval    GI: GI prophylaxis                                          Feeding per speech and swallow    RENAL:  F/u  lytes.  Correct as needed. accurate I/O, u Na, u osm    INFECTIOUS DISEASE: pancx,  procal    HEMATOLOGICAL:  DVT prophylaxis. LE doppler    ENDOCRINE:  Follow up FS.  Insulin protocol if needed. TSH    Poor prognosis  GOC  Palliative care eval  SDU

## 2024-07-01 NOTE — CONSULT NOTE ADULT - TIME BILLING
Time spent on total encounter assessing patient, examination, chart review, counseling and coordinating care by the attending physician/nurse/care manager as well as GOC discussion.
Hyponatremia, hypotension, PHT, chronic systolic CHF.

## 2024-07-01 NOTE — SWALLOW BEDSIDE ASSESSMENT ADULT - COMMENTS
Known to ST service, FEEs completed 11/14/23 revealed mild-mod pharyngeal dysphagia with regular with thins via single cup sips using consecutive swallows. Known to ST service, FEES (11/14/23) revealed mild-mod pharyngeal dysphagia with regular with thins via single cup sips using consecutive swallows.

## 2024-07-01 NOTE — CONSULT NOTE ADULT - ASSESSMENT
58 y/o woman w PMHx of HTN, HLD, DM, CAD s/p stent, HFrEF 25%, prior ICD s/p removal due to infection, severe pulm HTN, follows w Cardio NP Cyn Manzano, COPD on 4L NC, CORNELIUS no CPAP, CKD baseline Cr 1.3, CVA w residual L sided weakness and baseline bedbound vs wheelchair reliant, hypothyroidism, presented to ED for 1 week of malaise/fatigue found to be hyponatremic. Dermatology consulted for diffuse skin rash.    Diffuse skin rash    - Excoriations diffusely on arms, legs, face, and genitals  - No plaques or raised lesions with central clearing    Likely eczematous rash    - Use Aquaphor/emollients for skin protection  - No clinical signs of tinea 58 y/o woman w PMHx of HTN, HLD, DM, CAD s/p stent, HFrEF 25%, prior ICD s/p removal due to infection, severe pulm HTN, follows w Cardio NP Cyn Manzano, COPD on 4L NC, CORNELIUS no CPAP, CKD baseline Cr 1.3, CVA w residual L sided weakness and baseline bedbound vs wheelchair reliant, hypothyroidism, presented to ED for 1 week of malaise/fatigue found to be hyponatremic. Dermatology consulted for diffuse skin rash.    Diffuse skin rash    - (photos reviewed) Excoriations diffusely on arms, legs, face, and genitals  - No plaques or raised lesions with central clearing    Likely eczematous rash now with excoriations remaining    - Use Aquaphor/emollients for skin protection  - No clinical signs of tinea

## 2024-07-01 NOTE — ED ADULT NURSE REASSESSMENT NOTE - NS ED NURSE REASSESS COMMENT FT1
followed with MD Doshi about pts BP, urine sent as ordered   MD states will come assess the pt   awaiting for new orders

## 2024-07-01 NOTE — CONSULT NOTE ADULT - SUBJECTIVE AND OBJECTIVE BOX
HPI:  60 y/o woman w PMHx of HTN, HLD, DM, CAD s/p stent, HFrEF 25%, prior ICD s/p removal due to infection, severe pulm HTN, follows w Cardio NP Cyn Charanaly, COPD on 4L NC, CORNELIUS no CPAP, CKD baseline Cr 1.3, CVA w residual L sided weakness and baseline bedbound vs wheelchair reliant, hypothyroidism, presented to ED for 1 week of malaise/fatigue, decreased PO intake and insomnia, in ED found to have WBC 8.27, Na 117, K 3.3, Cr 2.3, lactate 1.2, , given 500cc NS, CXR unremarkable, admitted to medicine stepdown 6/30/24 for hyponatremia of 117 and JEMIMA.  HCP brother Pérez Ramos whose phone number she cannot recall     Pt is a very brief historian - mostly asked for food and blanket during our conversation.   States she lives at home w HHA helping w food and other home needs, as pt is bed/wheelchair reliant. States over past week she was not eating because she couldn't sleep. When asked how those two things are related, patient said "I don't know" and when asked if she had appetite, then said no. Unable to find any reason pt suddenly had insomnia; denies any new meds, new supplements, changes in medication doses.   ROS: Denies any new symptoms aside from fatigue/malaise, insomnia, and loss of appetite. Denies any CP, SOB, changes in urination, n/v/d, abd pain, leg swelling. No recent travel or sick contacts. No fevers, chills.     Triage vitals were: 92/65, 74bpm, RR20, 100% on RA, 98.4  Repeat vitals notable for: 102/60, 67bpm, RR18, 98% on 4L   Labs notable for: WBC 8.27, Na 117, K 3.3, Cr 2.3, lactate 1.2, , UA(+)  Imaging: CXR unremarkable  Treatments: given 500cc NS      Dermatology consulted for a diffuse rash and to r/o ringworm.      PAST MEDICAL & SURGICAL HISTORY:  Hypertension      Hyperlipidemia      Anxiety and depression      COPD, severe      CHF (congestive heart failure)      Cerebrovascular accident (CVA)  Multiple      Type 2 diabetes mellitus      CKD (chronic kidney disease), stage II      No significant past surgical history            MEDICATIONS  (STANDING):  atorvastatin 80 milliGRAM(s) Oral at bedtime  calamine/zinc oxide Lotion 1 Application(s) Topical three times a day  cefTRIAXone   IVPB 1000 milliGRAM(s) IV Intermittent every 24 hours  clopidogrel Tablet 75 milliGRAM(s) Oral daily  dextrose 10% Bolus 125 milliLiter(s) IV Bolus once  dextrose 5%. 1000 milliLiter(s) (50 mL/Hr) IV Continuous <Continuous>  dextrose 5%. 1000 milliLiter(s) (100 mL/Hr) IV Continuous <Continuous>  dextrose 50% Injectable 25 Gram(s) IV Push once  dextrose 50% Injectable 12.5 Gram(s) IV Push once  glucagon  Injectable 1 milliGRAM(s) IntraMuscular once  heparin   Injectable 5000 Unit(s) SubCutaneous every 8 hours  insulin lispro (ADMELOG) corrective regimen sliding scale   SubCutaneous three times a day before meals  insulin lispro (ADMELOG) corrective regimen sliding scale   SubCutaneous at bedtime  levothyroxine 25 MICROGram(s) Oral daily  norepinephrine Infusion 0.05 MICROgram(s)/kG/Min (8.44 mL/Hr) IV Continuous <Continuous>  potassium chloride    Tablet ER 20 milliEquivalent(s) Oral every 2 hours  sodium chloride 0.45%. 1000 milliLiter(s) (100 mL/Hr) IV Continuous <Continuous>    MEDICATIONS  (PRN):  acetaminophen     Tablet .. 650 milliGRAM(s) Oral every 6 hours PRN Temp greater or equal to 38C (100.4F), Mild Pain (1 - 3)  dextrose Oral Gel 15 Gram(s) Oral once PRN Blood Glucose LESS THAN 70 milliGRAM(s)/deciliter  melatonin 3 milliGRAM(s) Oral at bedtime PRN Insomnia      Allergies    No Known Allergies        FAMILY HISTORY:  FH: diabetes mellitus        Vital Signs Last 24 Hrs  T(C): 36.6 (01 Jul 2024 15:49), Max: 36.9 (30 Jun 2024 20:37)  T(F): 97.9 (01 Jul 2024 15:49), Max: 98.5 (30 Jun 2024 20:37)  HR: 90 (01 Jul 2024 15:49) (60 - 100)  BP: 108/55 (01 Jul 2024 15:49) (65/43 - 135/63)  BP(mean): 91 (01 Jul 2024 06:10) (45 - 91)  RR: 16 (01 Jul 2024 15:49) (15 - 18)  SpO2: 100% (01 Jul 2024 15:49) (98% - 100%)    Parameters below as of 01 Jul 2024 15:49  Patient On (Oxygen Delivery Method): nasal cannula  O2 Flow (L/min): 4        LABS:                        9.3    8.70  )-----------( 198      ( 01 Jul 2024 05:46 )             26.9     07-01    126<L>  |  88<L>  |  35<H>  ----------------------------<  156<H>  3.4<L>   |  22  |  1.7<H>    Ca    8.7      01 Jul 2024 05:46  Phos  2.2     07-01  Mg     2.2     07-01    TPro  6.3  /  Alb  3.6  /  TBili  0.8  /  DBili  x   /  AST  15  /  ALT  13  /  AlkPhos  177<H>  07-01    PT/INR - ( 30 Jun 2024 12:20 )   PT: 10.70 sec;   INR: 0.94 ratio         PTT - ( 30 Jun 2024 12:20 )  PTT:31.7 sec  Urinalysis Basic - ( 01 Jul 2024 05:46 )    Color: x / Appearance: x / SG: x / pH: x  Gluc: 156 mg/dL / Ketone: x  / Bili: x / Urobili: x   Blood: x / Protein: x / Nitrite: x   Leuk Esterase: x / RBC: x / WBC x   Sq Epi: x / Non Sq Epi: x / Bacteria: x

## 2024-07-01 NOTE — PROGRESS NOTE ADULT - SUBJECTIVE AND OBJECTIVE BOX
WILLIAN MARY  59y  [facility]-N ED Hold 023 A      Patient is a 59y old  Female who presents with a chief complaint of hyponatremia (01 Jul 2024 05:53)      INTERVAL HPI/OVERNIGHT EVENTS:        REVIEW OF SYSTEMS:        FAMILY HISTORY:  FH: diabetes mellitus (Father, Mother)      T(C): 36.7 (07-01-24 @ 07:22), Max: 37.1 (06-30-24 @ 11:50)  HR: 78 (07-01-24 @ 07:22) (60 - 100)  BP: 105/50 (07-01-24 @ 07:22) (65/43 - 135/63)  RR: 16 (07-01-24 @ 07:22) (16 - 20)  SpO2: 98% (07-01-24 @ 07:22) (98% - 100%)  Wt(kg): --Vital Signs Last 24 Hrs  T(C): 36.7 (01 Jul 2024 07:22), Max: 37.1 (30 Jun 2024 11:50)  T(F): 98.1 (01 Jul 2024 07:22), Max: 98.8 (30 Jun 2024 11:50)  HR: 78 (01 Jul 2024 07:22) (60 - 100)  BP: 105/50 (01 Jul 2024 07:22) (65/43 - 135/63)  BP(mean): 91 (01 Jul 2024 06:10) (45 - 91)  RR: 16 (01 Jul 2024 07:22) (16 - 20)  SpO2: 98% (01 Jul 2024 07:22) (98% - 100%)    Parameters below as of 01 Jul 2024 07:22  Patient On (Oxygen Delivery Method): nasal cannula  O2 Flow (L/min): 4      PHYSICAL EXAM:  GENERAL: NAD, well-groomed, well-developed  HEAD:  Atraumatic, Normocephalic  EYES: EOMI, PERRLA, conjunctiva and sclera clear  ENMT: No tonsillar erythema, exudates, or enlargement; Moist mucous membranes, Good dentition, No lesions  NECK: Supple, No JVD, Normal thyroid  NERVOUS SYSTEM:  Alert & Oriented X3, Good concentration; Motor Strength 5/5 B/L upper and lower extremities; DTRs 2+ intact and symmetric  PULM: Clear to auscultation bilaterally  CARDIAC: Regular rate and rhythm; No murmurs, rubs, or gallops  GI: Soft, Nontender, Nondistended; Bowel sounds present  EXTREMITIES:  2+ Peripheral Pulses, No clubbing, cyanosis, or edema  LYMPH: No lymphadenopathy noted  SKIN: No rashes or lesions    Consultant(s) Notes Reviewed:  [x ] YES  [ ] NO  Care Discussed with Consultants/Other Providers [ x] YES  [ ] NO    LABS:                            9.3    8.70  )-----------( 198      ( 01 Jul 2024 05:46 )             26.9   07-01    126<L>  |  88<L>  |  35<H>  ----------------------------<  156<H>  3.4<L>   |  22  |  1.7<H>    Ca    8.7      01 Jul 2024 05:46  Phos  2.2     07-01  Mg     2.2     07-01    TPro  6.3  /  Alb  3.6  /  TBili  0.8  /  DBili  x   /  AST  15  /  ALT  13  /  AlkPhos  177<H>  07-01            Urinalysis with Rflx Culture (collected 30 Jun 2024 11:32)      acetaminophen     Tablet .. 650 milliGRAM(s) Oral every 6 hours PRN  atorvastatin 80 milliGRAM(s) Oral at bedtime  calamine/zinc oxide Lotion 1 Application(s) Topical three times a day  clopidogrel Tablet 75 milliGRAM(s) Oral daily  dextrose 10% Bolus 125 milliLiter(s) IV Bolus once  dextrose 5%. 1000 milliLiter(s) IV Continuous <Continuous>  dextrose 5%. 1000 milliLiter(s) IV Continuous <Continuous>  dextrose 50% Injectable 12.5 Gram(s) IV Push once  dextrose 50% Injectable 25 Gram(s) IV Push once  dextrose Oral Gel 15 Gram(s) Oral once PRN  glucagon  Injectable 1 milliGRAM(s) IntraMuscular once  heparin   Injectable 5000 Unit(s) SubCutaneous every 8 hours  insulin lispro (ADMELOG) corrective regimen sliding scale   SubCutaneous three times a day before meals  insulin lispro (ADMELOG) corrective regimen sliding scale   SubCutaneous at bedtime  lactated ringers. 1000 milliLiter(s) IV Continuous <Continuous>  levothyroxine 25 MICROGram(s) Oral daily  melatonin 3 milliGRAM(s) Oral at bedtime PRN  norepinephrine Infusion 0.05 MICROgram(s)/kG/Min IV Continuous <Continuous>        58 y/o woman w PMHx of HTN, HLD, DM, CAD s/p stent, HFrEF 25%, prior ICD s/p removal due to infection, severe pulm HTN, follows w Cardio NP Cyn Manzano, COPD on 4L NC, CORNELIUS no CPAP, CKD baseline Cr 1.3, CVA w residual L sided weakness and baseline bedbound vs wheelchair reliant, hypothyroidism, presented to ED for 1 week of malaise/fatigue, decreased PO intake and insomnia, in ED found to have WBC 8.27, Na 117, K 3.3, Cr 2.3, lactate 1.2, , given 500cc NS, CXR unremarkable, admitted to medicine stepdown 6/30/24 for hyponatremia of 117 and JEMIMA.  HCP brother Pérez Mary whose phone number she cannot recall     *** Med rec incomplete - done using 1/2024 Cardio visit note.   pt ?lost her list, did not have phone number of family, not in surescripts  [facility] ([facility] location) [phone] or ([place] location) [phone] to confirm       1. Hypovolemic shock Acute hypovolemic hyponatremia secondary to poor intake complicated by acute kidney injury on top of chronic kidney disease. Cr:1.3    - Admit to SDU           - TSH:pending          -Cortisol:pending   - Received 500 ml of NS with target 6-8 in 24 hours. it went up by 9 in < 24 hours. Start half ns at  100ml/hr for 6 hours . Repeat BMP at 16:00   - Started on noripenephrine and midodrine   - Spironolactone held, Entresto held in setting of JEMIMA   - Blood cx:pending   - Echocardio:pending   - Renal US:pending   - Spironolactone held, Entresto held in setting of JEMIMA   - Cardio consult to guide fluid repletion     2. Acute UTI   - U/a: positive   - Cont rocephin   - Urine cx:pending     3. CAD s/p stent/HFrEF 25%/ prior ICD s/p removal due to infection  #severe pulm HTN  follows w Cardio NP Cyn Manzano  Home meds include: Entresto, Spironolactone, Dapagliflozin, Metoprolol  - Torsemide 20mg BID held   - Entresto and spironolactone held  - Dapagliflozin on hold   - metoprolol continued   - Cardio consulted   - Fluid restrict, daily weight, strict I/Os     4. COPD on 4L NC - on supplemental O2 here, no wheezing     5. CORNELIUS no CPAP - pt denies CPAP but outpatient notes document CPAP (?)     6. HTN - meds held as noted above     7. HLD - continue atorvastatin 80mg qhs     8. DM - +1 SS; on dapagliflozin at home, no other DM meds    9. Hypothyroidism - continue levothyroxine 50mcg qd                                                                                ----------------------------------------------------  # DVT prophylaxis: Heparin 5000 q8h   # GI prophylaxis: n/a   # Diet: Regular DASH TLC CC +S&S consult   # Activity: Increase as tolerated +PT consult  # Code status: FULL CODE   # Disposition: SDU    WILLIAN MARY  59y  [facility] ED Hold 023 A      Patient is a 59y old  Female who presents with a chief complaint of hyponatremia (01 Jul 2024 05:53)      INTERVAL HPI/OVERNIGHT EVENTS:  Patient is ill looking  no clear reason for hypovolemia except decrease po intake  no other signs noted   she is complaining of diffuse itching         FAMILY HISTORY:  FH: diabetes mellitus (Father, Mother)      T(C): 36.7 (07-01-24 @ 07:22), Max: 37.1 (06-30-24 @ 11:50)  HR: 78 (07-01-24 @ 07:22) (60 - 100)  BP: 105/50 (07-01-24 @ 07:22) (65/43 - 135/63)  RR: 16 (07-01-24 @ 07:22) (16 - 20)  SpO2: 98% (07-01-24 @ 07:22) (98% - 100%)  Wt(kg): --Vital Signs Last 24 Hrs  T(C): 36.7 (01 Jul 2024 07:22), Max: 37.1 (30 Jun 2024 11:50)  T(F): 98.1 (01 Jul 2024 07:22), Max: 98.8 (30 Jun 2024 11:50)  HR: 78 (01 Jul 2024 07:22) (60 - 100)  BP: 105/50 (01 Jul 2024 07:22) (65/43 - 135/63)  BP(mean): 91 (01 Jul 2024 06:10) (45 - 91)  RR: 16 (01 Jul 2024 07:22) (16 - 20)  SpO2: 98% (01 Jul 2024 07:22) (98% - 100%)    Parameters below as of 01 Jul 2024 07:22  Patient On (Oxygen Delivery Method): nasal cannula  O2 Flow (L/min): 4      PHYSICAL EXAM:  GENERAL: NAD, well-groomed, well-developed  PULM: Clear to auscultation bilaterally  CARDIAC: Regular rate and rhythm;   GI: Soft, Nontender, Nondistended;t  EXTREMITIES:  2+ Peripheral Pulses,    Consultant(s) Notes Reviewed:  [x ] YES  [ ] NO  Care Discussed with Consultants/Other Providers [ x] YES  [ ] NO    LABS:                            9.3    8.70  )-----------( 198      ( 01 Jul 2024 05:46 )             26.9   07-01    126<L>  |  88<L>  |  35<H>  ----------------------------<  156<H>  3.4<L>   |  22  |  1.7<H>    Ca    8.7      01 Jul 2024 05:46  Phos  2.2     07-01  Mg     2.2     07-01    TPro  6.3  /  Alb  3.6  /  TBili  0.8  /  DBili  x   /  AST  15  /  ALT  13  /  AlkPhos  177<H>  07-01            Urinalysis with Rflx Culture (collected 30 Jun 2024 11:32)      acetaminophen     Tablet .. 650 milliGRAM(s) Oral every 6 hours PRN  atorvastatin 80 milliGRAM(s) Oral at bedtime  calamine/zinc oxide Lotion 1 Application(s) Topical three times a day  clopidogrel Tablet 75 milliGRAM(s) Oral daily  dextrose 10% Bolus 125 milliLiter(s) IV Bolus once  dextrose 5%. 1000 milliLiter(s) IV Continuous <Continuous>  dextrose 5%. 1000 milliLiter(s) IV Continuous <Continuous>  dextrose 50% Injectable 12.5 Gram(s) IV Push once  dextrose 50% Injectable 25 Gram(s) IV Push once  dextrose Oral Gel 15 Gram(s) Oral once PRN  glucagon  Injectable 1 milliGRAM(s) IntraMuscular once  heparin   Injectable 5000 Unit(s) SubCutaneous every 8 hours  insulin lispro (ADMELOG) corrective regimen sliding scale   SubCutaneous three times a day before meals  insulin lispro (ADMELOG) corrective regimen sliding scale   SubCutaneous at bedtime  lactated ringers. 1000 milliLiter(s) IV Continuous <Continuous>  levothyroxine 25 MICROGram(s) Oral daily  melatonin 3 milliGRAM(s) Oral at bedtime PRN  norepinephrine Infusion 0.05 MICROgram(s)/kG/Min IV Continuous <Continuous>        58 y/o woman w PMHx of HTN, HLD, DM, CAD s/p stent, HFrEF 25%, prior ICD s/p removal due to infection, severe pulm HTN, follows w Cardio NP Cyn Manzano, COPD on 4L NC, CORNELIUS no CPAP, CKD baseline Cr 1.3, CVA w residual L sided weakness and baseline bedbound vs wheelchair reliant, hypothyroidism, presented to ED for 1 week of malaise/fatigue, decreased PO intake and insomnia, in ED found to have WBC 8.27, Na 117, K 3.3, Cr 2.3, lactate 1.2, , given 500cc NS, CXR unremarkable, admitted to medicine stepdown 6/30/24 for hyponatremia of 117 and JEMIMA.  HCP brother Pérez Mary whose phone number she cannot recall     *** Med rec incomplete - done using 1/2024 Cardio visit note.   pt ?lost her list, did not have phone number of family, not in surescripts  Call [facility] ([facility] location) [phone] or ([place] location) [phone] to confirm       1. Shock (not clear if hypovolemia vs cardiogenic)  complicated by  Acute hypovolemic hyponatremia secondary to poor intake complicated by acute kidney injury on top of chronic kidney disease. Cr:1.3    - Admit to SDU           - TSH:pending          -Cortisol:pending   - Received 500 ml of NS with target 6-8 in 24 hours. it went up by 9 in < 24 hours. Start half ns at  100ml/hr for 6 hours . Repeat BMP at 16:00   - Started on noripenephrine and midodrine   - Torsemide, spironolactone and entresto were hold in setting of JEMIMA   - Blood cx:pending   - Echocardio:pending   - Renal US:pending   - Cardio consult appreciatrefd    2. Acute UTI   - U/a: positive   - Cont rocephin   - Urine cx:pending     3. CAD s/p stent/HFrEF 25%/ prior ICD s/p removal due to infection _ severe pulm HTN  follows w Cardio NP Cyn Manzano  Home meds include: Entresto, Spironolactone, Dapagliflozin, Metoprolol  - Torsemide 20mg BID held   - Entresto and spironolactone held  - Dapagliflozin on hold   - metoprolol continued   - Cardio consulted     4. COPD on 4L NC - on supplemental O2 here, no wheezing     5. CORNELIUS no CPAP - pt denies CPAP but outpatient notes document CPAP (?)     6. HTN - meds held as noted above     7. HLD - continue atorvastatin 80mg qhs     8. DM - +1 SS; on dapagliflozin at home, no other DM meds    9. Hypothyroidism - continue levothyroxine 50mcg qd    10. Generalized itching with multiple rash that looks like ringworm   * reported itching is worse in the genital area but I can only few spot that looks like ringworm   - Calamine lition   - fluconazole 150mg *1   - Dermatology consult:pending                                                                                 ----------------------------------------------------  # DVT prophylaxis: Heparin 5000 q8h   # GI prophylaxis: n/a   # Diet: Regular DASH TLC CC +S&S consult   # Activity: Increase as tolerated +PT consult  # Code status: FULL CODE   # Disposition: SDU       Patient will be transferred to cardio step down unit for further evaluation and management

## 2024-07-01 NOTE — CHART NOTE - NSCHARTNOTEFT_GEN_A_CORE
Hospital Course:  Patient is a 59 yoF with PMHx of HFrEF 25%, severe pulmonary HTN, DM, COPD on 4L NS, CORNELIUS, CKS baseline Cr 1.3 CVA with L sided lower extremity weakness who utilizes a wheelchair at baseline presented with decreased po intake and weakness. Patient vitals in ED: BP 92/65 HR 74 Temp 98.4 Labs significant for Na 117, K 3.3 Cr 2.3 Lactate 1.2 . Patient was given 500 cc bolus NS and admitted for hyponatremia and JEMIMA.     Patient was hypotensive overnight with BP 93/50 given 500 cc bolus and midodrine 10 mg without improvement. Patient put on Levophed which she is currently on taper now. Patient BP at 7am 105/50 without symptoms of SOB, chest pain.     Patient Transfer from: Main ED 12B    Transfer to: Cardio Step Down  H&P:    60 y/o woman w PMHx of HTN, HLD, DM, CAD s/p stent, HFrEF 25%, prior ICD s/p removal due to infection, severe pulm HTN, follows w Cardio NP Cyn Sotoanaly, COPD on 4L NC, CORNELIUS no CPAP, CKD baseline Cr 1.3, CVA w residual L sided weakness and baseline bedbound vs wheelchair reliant, hypothyroidism, presented to ED for 1 week of malaise/fatigue, decreased PO intake and insomnia, in ED found to have WBC 8.27, Na 117, K 3.3, Cr 2.3, lactate 1.2, , given 500cc NS, CXR unremarkable, admitted to medicine stepdown 6/30/24 for hyponatremia of 117 and JEMIMA.    HCP brother Pérez Ramos whose phone number she cannot recall     Pt is a very brief historian - mostly asked for food and blanket during our conversation.   States she lives at home w HHA helping w food and other home needs, as pt is bed/wheelchair reliant. States over past week she was not eating because she couldn't sleep. When asked how those two things are related, patient said "I don't know" and when asked if she had appetite, then said no. Unable to find any reason pt suddenly had insomnia; denies any new meds, new supplements, changes in medication doses.     ROS: Denies any new symptoms aside from fatigue/malaise, insomnia, and loss of appetite. Denies any CP, SOB, changes in urination, n/v/d, abd pain, leg swelling. No recent travel or sick contacts. No fevers, chills.        Hospital Course:  Patient is a 59 yoF with PMHx of HFrEF 25%, severe pulmonary HTN, DM, COPD on 4L NS, CORNELIUS, CKS baseline Cr 1.3 CVA with L sided lower extremity weakness who utilizes a wheelchair at baseline presented with decreased po intake and weakness. Patient vitals in ED: BP 92/65 HR 74 Temp 98.4 Labs significant for Na 117, K 3.3 Cr 2.3 Lactate 1.2 . Patient was given 500 cc bolus NS and admitted for hyponatremia and JEMIMA.     Patient was hypotensive overnight with BP 93/50 given 500 cc bolus and midodrine 10 mg without improvement. Patient put on Levophed which she is currently on taper now. Patient BP at 7am 105/50 without symptoms of SOB, chest pain.     Assessment and Plan:  #Hypovolemic Shock Hyponatremia secondary to poor po intake?  -Received 500 ml NS , Na went up by 9 (117--> 126)  -Holding Spironolactone and Entresto (due to current JEMIMA)  -Echo: pending  -Rpt BMP at 4pm: pending  -TSH and Cortisol Pending   -Renal US: pending  -Cardio consult  -admit to SDU    #Hypotensive Episode  -S/p 500 cc bolus in ED  -s/p midodrine 10 mg without resolution  -Currently on Levophed       #CAD s/p stent HFrEF 25%/ prior ICD  #Severe Pulm HTN  -Home meds held: Entresto, Spiroolactone, Dapagliflozin  -C/w home meds: Metoprolol  -Cardio Consult       #Anemia  -Hg 10--> 9.3  -Probable dilution from fluids  -Cw to monitor      #JEMIMA   #Acute on Chronic  -Baseline Cr 1.3  -patient's Cr trending down from 2.3--> 1.7  -Continue to Home meds held: Entresto, Spiroolactone, Dapagliflozin      #UTI  -asymptomatic  +UA s/p Ceftriaxone 1 dose   -Ucx: pending  -C/w Rocephin    #Excoriations possible fungal infection Tinea Corpis possible manifestation of Psychiatric illness Hx Bipolar)   -Patient describes generalized itchiness  -Multiple excoriations on abdomen and face  -Pt used calamine lotion all over body  -Patient has Hx of Bipolar disorder, not currently on medications   -Derm consult pending     F/U Palliative    Pending: Cardio, Transfer from: Main ED 12B    Transfer to: Cardio Step Down    H&P:    58 y/o woman w PMHx of HTN, HLD, DM, CAD s/p stent, HFrEF 25%, prior ICD s/p removal due to infection, severe pulm HTN, follows w Cardio NP Cyn Sotoanaly, COPD on 4L NC, CORNELIUS no CPAP, CKD baseline Cr 1.3, CVA w residual L sided weakness and baseline bedbound vs wheelchair reliant, hypothyroidism, presented to ED for 1 week of malaise/fatigue, decreased PO intake and insomnia, in ED found to have WBC 8.27, Na 117, K 3.3, Cr 2.3, lactate 1.2, , given 500cc NS, CXR unremarkable, admitted to medicine stepdown 6/30/24 for hyponatremia of 117 and JEMIMA.    HCP brother Pérez Ramos whose phone number she cannot recall     Pt is a very brief historian - mostly asked for food and blanket during our conversation.   States she lives at home w HHA helping w food and other home needs, as pt is bed/wheelchair reliant. States over past week she was not eating because she couldn't sleep. When asked how those two things are related, patient said "I don't know" and when asked if she had appetite, then said no. Unable to find any reason pt suddenly had insomnia; denies any new meds, new supplements, changes in medication doses.     ROS: Denies any new symptoms aside from fatigue/malaise, insomnia, and loss of appetite. Denies any CP, SOB, changes in urination, n/v/d, abd pain, leg swelling. No recent travel or sick contacts. No fevers, chills.        Hospital Course:  Patient is a 59 yoF with PMHx of HFrEF 25%, severe pulmonary HTN, DM, COPD on 4L NS, CORNELIUS, CKS baseline Cr 1.3 CVA with L sided lower extremity weakness who utilizes a wheelchair at baseline presented with decreased po intake and weakness. Patient vitals in ED: BP 92/65 HR 74 Temp 98.4 Labs significant for Na 117, K 3.3 Cr 2.3 Lactate 1.2 . Patient was given 500 cc bolus NS and admitted for hyponatremia and JEMIMA.     Patient was hypotensive overnight with BP 93/50 given 500 cc bolus and midodrine 10 mg without improvement. Patient put on Levophed which she is currently on 0.03 taper now. Patient BP at 7am 105/50 without symptoms of SOB, chest pain.     Assessment and Plan:  #Hypovolemic Shock Hyponatremia secondary to poor po intake?  -Received 500 ml NS , Na went up by 9 (117--> 126)  -Holding Spironolactone and Entresto (due to current JEMIMA)  -Echo: pending  -Rpt BMP at 4pm: pending  -TSH and Cortisol Pending   -Renal US: pending  -Cardio consult  -admit to SDU    #Hypotensive Episode  -S/p 500 cc bolus in ED  -s/p midodrine 10 mg without resolution  -Currently on Levophed       #CAD s/p stent HFrEF 25%/ prior ICD  #Severe Pulm HTN  -Home meds held: Entresto, Spironolactone, Dapagliflozin  -C/w home meds: Metoprolol  -Cardio Consult       #Anemia  -Hg 10--> 9.3  -Probable dilution from fluids  -Cw to monitor      #JEMIMA   #Acute on Chronic  -Baseline Cr 1.3  -patient's Cr trending down from 2.3--> 1.7  -Continue to Home meds held: Entresto, Spironolactone, Dapagliflozin      #UTI  -asymptomatic  +UA s/p Ceftriaxone 1 dose   -Ucx: pending  -C/w Rocephin    #Excoriations possible fungal infection Tinea Corpis possible manifestation of Psychiatric illness Hx Bipolar)   -Patient describes generalized itchiness  -Multiple excoriations on abdomen and face  -Pt used calamine lotion all over body  -Patient has Hx of Bipolar disorder, not currently on medications   -Derm consult pending     F/U Palliative    Pending: Cardio, Echo, BMP Cortisol, TSH

## 2024-07-01 NOTE — CONSULT NOTE ADULT - SUBJECTIVE AND OBJECTIVE BOX
NEPHROLOGY CONSULTATION NOTE    60 y/o woman w PMHx of HTN, HLD, DM, CAD s/p stent, HFrEF 25%, prior ICD s/p removal due to infection, severe pulm HTN, follows w Cardio NP Cyn Manzano, COPD on 4L NC, CORNELIUS no CPAP, CKD baseline Cr 1.3, CVA w residual L sided weakness and baseline bedbound vs wheelchair reliant, hypothyroidism, presented to ED for 1 week of malaise/fatigue, decreased PO intake and insomnia, in ED found to have WBC 8.27, Na 117, K 3.3, Cr 2.3, lactate 1.2, , given 500cc NS, CXR unremarkable, admitted to medicine stepdown 6/30/24 for hyponatremia of 117 and EJMIMA.      PAST MEDICAL & SURGICAL HISTORY:  Hypertension    Hyperlipidemia    Anxiety and depression    COPD, severe    CHF (congestive heart failure)    Cerebrovascular accident (CVA)  Multiple    Type 2 diabetes mellitus    CKD (chronic kidney disease), stage II    No significant past surgical history      Allergies:  No Known Allergies    Home Medications Reviewed  Hospital Medications:   MEDICATIONS  (STANDING):  atorvastatin 80 milliGRAM(s) Oral at bedtime  calamine/zinc oxide Lotion 1 Application(s) Topical three times a day  clopidogrel Tablet 75 milliGRAM(s) Oral daily  dextrose 10% Bolus 125 milliLiter(s) IV Bolus once  dextrose 5%. 1000 milliLiter(s) (50 mL/Hr) IV Continuous <Continuous>  dextrose 5%. 1000 milliLiter(s) (100 mL/Hr) IV Continuous <Continuous>  dextrose 50% Injectable 12.5 Gram(s) IV Push once  dextrose 50% Injectable 25 Gram(s) IV Push once  glucagon  Injectable 1 milliGRAM(s) IntraMuscular once  heparin   Injectable 5000 Unit(s) SubCutaneous every 8 hours  insulin lispro (ADMELOG) corrective regimen sliding scale   SubCutaneous three times a day before meals  insulin lispro (ADMELOG) corrective regimen sliding scale   SubCutaneous at bedtime  lactated ringers. 1000 milliLiter(s) (75 mL/Hr) IV Continuous <Continuous>  levothyroxine 25 MICROGram(s) Oral daily  norepinephrine Infusion 0.05 MICROgram(s)/kG/Min (8.44 mL/Hr) IV Continuous <Continuous>    SOCIAL HISTORY:  Denies ETOH,Smoking,   FAMILY HISTORY:  FH: diabetes mellitus (Father, Mother)    REVIEW OF SYSTEMS:  CONSTITUTIONAL: + weakness, No fevers or chills  EYES/ENT: No visual changes;  No vertigo or throat pain   NECK: No pain or stiffness  RESPIRATORY: + cough started 1 week ago, No wheezing, hemoptysis; No shortness of breath  CARDIOVASCULAR: No chest pain or palpitations.  GASTROINTESTINAL: No abdominal or epigastric pain. No nausea, vomiting, or hematemesis; No diarrhea or constipation. No melena or hematochezia.  GENITOURINARY: No dysuria, frequency, foamy urine, urinary urgency, incontinence or hematuria  NEUROLOGICAL: No numbness or weakness  SKIN: No itching, burning, rashes, or lesions   VASCULAR: No bilateral lower extremity edema.   All other review of systems is negative unless indicated above.    VITALS:  Vital Signs Last 24 Hrs  T(C): 36.7 (01 Jul 2024 07:22), Max: 37.1 (30 Jun 2024 11:50)  T(F): 98.1 (01 Jul 2024 07:22), Max: 98.8 (30 Jun 2024 11:50)  HR: 78 (01 Jul 2024 07:22) (60 - 100)  BP: 105/50 (01 Jul 2024 07:22) (65/43 - 135/63)  BP(mean): 91 (01 Jul 2024 06:10) (45 - 91)  RR: 16 (01 Jul 2024 07:22) (16 - 20)  SpO2: 98% (01 Jul 2024 07:22) (98% - 100%)    Parameters below as of 01 Jul 2024 07:22  Patient On (Oxygen Delivery Method): nasal cannula  O2 Flow (L/min): 4        PHYSICAL EXAM:  Constitutional: NAD  HEENT: anicteric sclera, oropharynx clear  Neck: No JVD  Respiratory: CTAB, no wheezes, rales or rhonchi  Cardiovascular: S1, S2, RRR  Gastrointestinal: BS+, soft, NT/ND  Extremities: No cyanosis or clubbing. No peripheral edema  Neurological: A/O x 3, no focal deficits  Psychiatric: Normal mood, normal affect  : No CVA tenderness. No blandon.   Skin: No rashes      LABS:  07-01    126<L>  |  88<L>  |  35<H>  ----------------------------<  156<H>  3.4<L>   |  22  |  1.7<H>    Ca    8.7      01 Jul 2024 05:46  Phos  2.2     07-01  Mg     2.2     07-01    TPro  6.3  /  Alb  3.6  /  TBili  0.8  /  DBili      /  AST  15  /  ALT  13  /  AlkPhos  177<H>  07-01    Creatinine Trend: 1.7 <--, 2.3 <--, 2.3 <--                        9.3    8.70  )-----------( 198      ( 01 Jul 2024 05:46 )             26.9     Urine Studies:  Urinalysis Basic - ( 01 Jul 2024 05:46 )    Color:  / Appearance:  / SG:  / pH:   Gluc: 156 mg/dL / Ketone:   / Bili:  / Urobili:    Blood:  / Protein:  / Nitrite:    Leuk Esterase:  / RBC:  / WBC    Sq Epi:  / Non Sq Epi:  / Bacteria:                          NEPHROLOGY CONSULTATION NOTE    60 y/o woman w PMHx of HTN, HLD, DM, CAD s/p stent, HFrEF 25%, prior ICD s/p removal due to infection, severe pulm HTN, follows w Cardio NP Cyn Manzano, COPD on 4L NC, CORNELIUS no CPAP, CKD baseline Cr 1.3, CVA w residual L sided weakness and baseline bedbound vs wheelchair reliant, hypothyroidism, presented to ED for 1 week of malaise/fatigue, decreased PO intake and insomnia, in ED found to have WBC 8.27, Na 117, K 3.3, Cr 2.3, lactate 1.2, , given 500cc NS, CXR unremarkable, admitted to medicine stepdown 6/30/24 for hyponatremia of 117 and JEMIMA.      PAST MEDICAL & SURGICAL HISTORY:  Hypertension  Hyperlipidemia  Anxiety and depression  COPD, severe  CHF (congestive heart failure)  Cerebrovascular accident (CVA)  Type 2 diabetes mellitus  CKD (chronic kidney disease), stage II  No significant past surgical history      Allergies:  No Known Allergies    Home Medications Reviewed  Hospital Medications:   MEDICATIONS  (STANDING):  atorvastatin 80 milliGRAM(s) Oral at bedtime  calamine/zinc oxide Lotion 1 Application(s) Topical three times a day  clopidogrel Tablet 75 milliGRAM(s) Oral daily  glucagon  Injectable 1 milliGRAM(s) IntraMuscular once  heparin   Injectable 5000 Unit(s) SubCutaneous every 8 hours  insulin lispro (ADMELOG) corrective regimen sliding scale   SubCutaneous three times a day before meals  insulin lispro (ADMELOG) corrective regimen sliding scale   SubCutaneous at bedtime  lactated ringers. 1000 milliLiter(s) (75 mL/Hr) IV Continuous <Continuous>  levothyroxine 25 MICROGram(s) Oral daily  norepinephrine Infusion 0.05 MICROgram(s)/kG/Min (8.44 mL/Hr) IV Continuous <Continuous>    SOCIAL HISTORY:  Denies ETOH,Smoking,   FAMILY HISTORY:  FH: diabetes mellitus (Father, Mother)    REVIEW OF SYSTEMS:  CONSTITUTIONAL: + weakness, No fevers or chills  EYES/ENT: No visual changes;  No vertigo or throat pain   NECK: No pain or stiffness  RESPIRATORY: + cough started 1 week ago, No wheezing, hemoptysis; No shortness of breath  CARDIOVASCULAR: No chest pain or palpitations.  GASTROINTESTINAL: No abdominal or epigastric pain. No nausea, vomiting, or hematemesis; No diarrhea or constipation. No melena or hematochezia.  GENITOURINARY: No dysuria, frequency, foamy urine, urinary urgency, incontinence or hematuria  NEUROLOGICAL: No numbness or weakness  SKIN: No itching, burning, rashes, or lesions   VASCULAR: No bilateral lower extremity edema.   All other review of systems is negative unless indicated above.    VITALS:  Vital Signs Last 24 Hrs  T(C): 36.7 (01 Jul 2024 07:22), Max: 37.1 (30 Jun 2024 11:50)  T(F): 98.1 (01 Jul 2024 07:22), Max: 98.8 (30 Jun 2024 11:50)  HR: 78 (01 Jul 2024 07:22) (60 - 100)  BP: 105/50 (01 Jul 2024 07:22) (65/43 - 135/63)  BP(mean): 91 (01 Jul 2024 06:10) (45 - 91)  RR: 16 (01 Jul 2024 07:22) (16 - 20)  SpO2: 98% (01 Jul 2024 07:22) (98% - 100%)    Parameters below as of 01 Jul 2024 07:22  Patient On (Oxygen Delivery Method): nasal cannula  O2 Flow (L/min): 4        PHYSICAL EXAM:  Constitutional: NAD  HEENT: anicteric sclera, oropharynx clear  Neck: No JVD  Respiratory: CTAB, no wheezes, rales or rhonchi  Cardiovascular: S1, S2, RRR  Gastrointestinal: BS+, soft, NT/ND  Extremities: No cyanosis or clubbing. No peripheral edema  Neurological: A/O x 3, no focal deficits  Psychiatric: Normal mood, normal affect  : No CVA tenderness. No blandon.   Skin: No rashes      LABS:  07-01    126<L>  |  88<L>  |  35<H>  ----------------------------<  156<H>  3.4<L>   |  22  |  1.7<H>    SODIUM TREND:  Sodium 126 [07-01 @ 05:46]  Sodium 119 [06-30 @ 17:47]  Sodium 117 [06-30 @ 12:20]    Ca    8.7      01 Jul 2024 05:46  Phos  2.2     07-01  Mg     2.2     07-01    TPro  6.3  /  Alb  3.6  /  TBili  0.8  /  DBili      /  AST  15  /  ALT  13  /  AlkPhos  177<H>  07-01    Creatinine Trend: 1.7 <--, 2.3 <--, 2.3 <--                        9.3    8.70  )-----------( 198      ( 01 Jul 2024 05:46 )             26.9     Urine Studies:  Urinalysis Basic - ( 01 Jul 2024 05:46 )    Color:  / Appearance:  / SG:  / pH:   Gluc: 156 mg/dL / Ketone:   / Bili:  / Urobili:    Blood:  / Protein:  / Nitrite:    Leuk Esterase:  / RBC:  / WBC    Sq Epi:  / Non Sq Epi:  / Bacteria:

## 2024-07-01 NOTE — CONSULT NOTE ADULT - ATTENDING COMMENTS
58 y/o woman w PMHx of HTN, HLD, DM, CAD s/p stent, HFrEF 25%, prior ICD s/p removal due to infection, severe pulm HTN, follows w Dr Ge COPD on 4L NC, CORNELIUS no CPAP, CKD baseline Cr 1.3, CVA w residual L sided weakness and baseline bedbound vs wheelchair reliant, hypothyroidism, presented to ED for 1 week of malaise/fatigue    # JEMIMA on CKD 3A due to diuresis poor po intake  # hyponatremia hypovolemic     - creat and sodium better  - Hold NS for now   - repeat BMP tonight and in AM   - serum sodium went up by 9 meq in 20 h   - no need for D5w   - keep diuretics entresto and Farxiga on hold  - strict I and O follow for signs of volume overload   - check TSH uric acid and AM cortisol    will follow
Patient seen, case d/w SDU team on rounds.  Agree with assessment and plan.  Still appears dry, will complete IVF (1L LR started in the ED).  Continue Levophed.  Monitor Na.  Cardiac monitoring overnight, reassess in AM.  HF f/u.

## 2024-07-02 LAB
ALBUMIN SERPL ELPH-MCNC: 3.5 G/DL — SIGNIFICANT CHANGE UP (ref 3.5–5.2)
ALP SERPL-CCNC: 186 U/L — HIGH (ref 30–115)
ALT FLD-CCNC: 10 U/L — SIGNIFICANT CHANGE UP (ref 0–41)
ANION GAP SERPL CALC-SCNC: 13 MMOL/L — SIGNIFICANT CHANGE UP (ref 7–14)
AST SERPL-CCNC: 12 U/L — SIGNIFICANT CHANGE UP (ref 0–41)
BASOPHILS # BLD AUTO: 0.12 K/UL — SIGNIFICANT CHANGE UP (ref 0–0.2)
BASOPHILS NFR BLD AUTO: 1.8 % — HIGH (ref 0–1)
BILIRUB SERPL-MCNC: 0.4 MG/DL — SIGNIFICANT CHANGE UP (ref 0.2–1.2)
BUN SERPL-MCNC: 24 MG/DL — HIGH (ref 10–20)
CALCIUM SERPL-MCNC: 8.9 MG/DL — SIGNIFICANT CHANGE UP (ref 8.4–10.4)
CHLORIDE SERPL-SCNC: 94 MMOL/L — LOW (ref 98–110)
CO2 SERPL-SCNC: 23 MMOL/L — SIGNIFICANT CHANGE UP (ref 17–32)
CREAT SERPL-MCNC: 1.6 MG/DL — HIGH (ref 0.7–1.5)
EGFR: 37 ML/MIN/1.73M2 — LOW
EOSINOPHIL # BLD AUTO: 0.12 K/UL — SIGNIFICANT CHANGE UP (ref 0–0.7)
EOSINOPHIL NFR BLD AUTO: 1.8 % — SIGNIFICANT CHANGE UP (ref 0–8)
GLUCOSE BLDC GLUCOMTR-MCNC: 125 MG/DL — HIGH (ref 70–99)
GLUCOSE BLDC GLUCOMTR-MCNC: 137 MG/DL — HIGH (ref 70–99)
GLUCOSE BLDC GLUCOMTR-MCNC: 152 MG/DL — HIGH (ref 70–99)
GLUCOSE BLDC GLUCOMTR-MCNC: 167 MG/DL — HIGH (ref 70–99)
GLUCOSE SERPL-MCNC: 157 MG/DL — HIGH (ref 70–99)
HCT VFR BLD CALC: 26.6 % — LOW (ref 37–47)
HGB BLD-MCNC: 8.9 G/DL — LOW (ref 12–16)
IMM GRANULOCYTES NFR BLD AUTO: 1.8 % — HIGH (ref 0.1–0.3)
LYMPHOCYTES # BLD AUTO: 1.28 K/UL — SIGNIFICANT CHANGE UP (ref 1.2–3.4)
LYMPHOCYTES # BLD AUTO: 18.7 % — LOW (ref 20.5–51.1)
MAGNESIUM SERPL-MCNC: 1.9 MG/DL — SIGNIFICANT CHANGE UP (ref 1.8–2.4)
MCHC RBC-ENTMCNC: 27.3 PG — SIGNIFICANT CHANGE UP (ref 27–31)
MCHC RBC-ENTMCNC: 33.5 G/DL — SIGNIFICANT CHANGE UP (ref 32–37)
MCV RBC AUTO: 81.6 FL — SIGNIFICANT CHANGE UP (ref 81–99)
MONOCYTES # BLD AUTO: 0.75 K/UL — HIGH (ref 0.1–0.6)
MONOCYTES NFR BLD AUTO: 11 % — HIGH (ref 1.7–9.3)
NEUTROPHILS # BLD AUTO: 4.44 K/UL — SIGNIFICANT CHANGE UP (ref 1.4–6.5)
NEUTROPHILS NFR BLD AUTO: 64.9 % — SIGNIFICANT CHANGE UP (ref 42.2–75.2)
NRBC # BLD: 0 /100 WBCS — SIGNIFICANT CHANGE UP (ref 0–0)
PHOSPHATE SERPL-MCNC: 1.9 MG/DL — LOW (ref 2.1–4.9)
PLATELET # BLD AUTO: 165 K/UL — SIGNIFICANT CHANGE UP (ref 130–400)
PMV BLD: 10 FL — SIGNIFICANT CHANGE UP (ref 7.4–10.4)
POTASSIUM SERPL-MCNC: 4.5 MMOL/L — SIGNIFICANT CHANGE UP (ref 3.5–5)
POTASSIUM SERPL-SCNC: 4.5 MMOL/L — SIGNIFICANT CHANGE UP (ref 3.5–5)
PROT SERPL-MCNC: 6.2 G/DL — SIGNIFICANT CHANGE UP (ref 6–8)
RBC # BLD: 3.26 M/UL — LOW (ref 4.2–5.4)
RBC # FLD: 14.1 % — SIGNIFICANT CHANGE UP (ref 11.5–14.5)
SODIUM SERPL-SCNC: 130 MMOL/L — LOW (ref 135–146)
WBC # BLD: 6.83 K/UL — SIGNIFICANT CHANGE UP (ref 4.8–10.8)
WBC # FLD AUTO: 6.83 K/UL — SIGNIFICANT CHANGE UP (ref 4.8–10.8)

## 2024-07-02 PROCEDURE — 99233 SBSQ HOSP IP/OBS HIGH 50: CPT

## 2024-07-02 PROCEDURE — 76770 US EXAM ABDO BACK WALL COMP: CPT | Mod: 26

## 2024-07-02 RX ORDER — TORSEMIDE 20 MG/1
10 TABLET ORAL DAILY
Refills: 0 | Status: DISCONTINUED | OUTPATIENT
Start: 2024-07-02 | End: 2024-07-03

## 2024-07-02 RX ADMIN — HEPARIN SODIUM 5000 UNIT(S): 50 INJECTION, SOLUTION INTRAVENOUS at 15:17

## 2024-07-02 RX ADMIN — CLOPIDOGREL BISULFATE 75 MILLIGRAM(S): 75 TABLET, FILM COATED ORAL at 12:09

## 2024-07-02 RX ADMIN — Medication 1 APPLICATION(S): at 05:25

## 2024-07-02 RX ADMIN — HEPARIN SODIUM 5000 UNIT(S): 50 INJECTION, SOLUTION INTRAVENOUS at 05:24

## 2024-07-02 RX ADMIN — Medication 1 APPLICATION(S): at 15:17

## 2024-07-02 RX ADMIN — INSULIN LISPRO 1: 100 INJECTION, SOLUTION SUBCUTANEOUS at 07:53

## 2024-07-02 RX ADMIN — Medication 100 MILLIGRAM(S): at 10:27

## 2024-07-02 RX ADMIN — HEPARIN SODIUM 5000 UNIT(S): 50 INJECTION, SOLUTION INTRAVENOUS at 21:21

## 2024-07-02 RX ADMIN — Medication 1 APPLICATION(S): at 05:24

## 2024-07-02 RX ADMIN — Medication 25 MICROGRAM(S): at 05:24

## 2024-07-02 RX ADMIN — ATORVASTATIN CALCIUM 80 MILLIGRAM(S): 20 TABLET, FILM COATED ORAL at 21:21

## 2024-07-02 RX ADMIN — Medication 1 APPLICATION(S): at 21:24

## 2024-07-02 NOTE — PROGRESS NOTE ADULT - ASSESSMENT
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  Patient was given 500 cc bolus NS and admitted for hyponatremia and JEMIMA. Pt coming to stepdown 4T to 4T cardiac telemetry to restart home meds.     Assessment and Plan:  #Hypovolemic Shock   -resolved      #Hyponatremia secondary to poor po intake and diuretics  -Received fluids and off levophed  -Na improved. Normal TSH, pending cortisol   -pending TTE  -Holding Spironolactone and Entresto (due to current JEMIMA) - resume as tolerated   -downgraded to telemetry     #CAD s/p stent HFrEF 25%/ prior ICD  -pending TTE    #Severe Pulm HTN  -Home meds held: Entresto, Spironolactone, Dapagliflozin  -C/w home meds: Metoprolol, Torsemide, atorvastatin      #JEMIMA -prerenal  #Acute on Chronic  -Baseline Cr 1.3  -patient's Cr trending down from 2.3--> 1.7  -Continue to Home meds held: Entresto, Spironolactone, Dapagliflozin  -follow up with nephrology     #UTI  -asymptomatic  +UA s/p Ceftriaxone 1 dose   -Ucx: E.coli  -C/w Rocephin    #Hypothyroidism  -c/w synthroid 25mcg daily     #Excoriations diffusely on arms, legs, face, and genitals No plaques or raised lesions with central clearing  #Likely eczematous rash now with excoriations remaining  - derm evaluation done     GI prophylaxis: none  DVT prophylaxis: heparin  Full code.

## 2024-07-02 NOTE — PROGRESS NOTE ADULT - SUBJECTIVE AND OBJECTIVE BOX
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 Patient vitals in ED: BP 92/65 HR 74 Temp 98.4 Labs significant for Na 117, K 3.3 Cr 2.3 Lactate 1.2 . Patient was given 500 cc bolus NS and admitted for hyponatremia and JEMIMA.     Patient was hypotensive overnight with BP 93/50 given 500 cc bolus and midodrine 10 mg without improvement. Patient put on Levophed and upgraded to cardiac stepdown. Patient BP at 7am 96/60 without symptoms of SOB, chest pain. off Levophed overnight. Will be downgraded to 3C    Interval history: 58 y/o woman w PMHx of HTN, HLD, DM, CAD s/p stent, HFrEF 25%, prior ICD s/p removal due to infection, severe pulm HTN, follows w Cardio NP Cyn Manzano, COPD on 4L NC, CORNELIUS no CPAP, CKD baseline Cr 1.3, CVA w residual L sided weakness and baseline bedbound vs wheelchair reliant, hypothyroidism, presented to ED for 1 week of malaise/fatigue, decreased PO intake and insomnia, in ED found to have WBC 8.27, Na 117, K 3.3, Cr 2.3, lactate 1.2, , given 500cc NS, CXR unremarkable, admitted to medicine stepdown 24 for hyponatremia of 117 and JEMIMA.    States she lives at home w HHA helping w food and other home needs, as pt is bed/wheelchair reliant. States over past week she was not eating because she couldn't sleep. When asked how those two things are related, patient said "I don't know" and when asked if she had appetite, then said no. Unable to find any reason pt suddenly had insomnia; denies any new meds, new supplements, changes in medication doses.     Review of systems: A complete 10-point review of systems was obtained and is negative except as stated in the interval history.    Vitals:  T(F): 98, Max: 98.8 ( @ 00:03)  HR: 81 (81 - 96)  BP: 102/52 (74/46 - 117/58)  RR: 17 (15 - 20)  SpO2: 100% (100% - 100%)    Ins & outs:      @ 07:  -   @ 07:00  --------------------------------------------------------  IN: 740 mL / OUT: 400 mL / NET: 340 mL     @ :  -   @ 17:49  --------------------------------------------------------  IN: 240 mL / OUT: 0 mL / NET: 240 mL      Weight trend:      Physical exam:  General: No apparent distress  HEENT: Anicteric sclera. Moist mucous membranes.   Cardiac: Regular rate and rhythm. No murmurs, rubs, or gallops.   Vascular: Symmetric radial pulses. Dorsalis pedis pulses palpable.   Respiratory: Normal effort. Bibasilar crackles. Clear to ascultation.   Abdomen: Soft, nontender. Audible bowel sounds.   Extremities: Warm with no edema. No cyanosis or clubbing.   Skin: Warm and dry. No rash.   Neurologic: Grossly normal motor function.   Psychiatric: Oriented to person, place, and time.     Data reviewed:  - Telemetry:   - EC24 NSR w/ 1st degree AV block   - TTE:   - Chest x-ray (date***):   - Stress test:   - CCTA:  - Cardiac catheterization:  - Cardiac MRI:    - Labs:                        8.9    6.83  )-----------( 165      ( 2024 05:30 )             26.6     0702    130<L>  |  94<L>  |  24<H>  ----------------------------<  157<H>  4.5   |  23  |  1.6<H>    Ca    8.9      2024 05:30  Phos  1.9     07-02  Mg     1.9     07-02    TPro  6.2  /  Alb  3.5  /  TBili  0.4  /  DBili  x   /  AST  12  /  ALT  10  /  AlkPhos  186<H>  07-02    Urinalysis Basic - ( 2024 05:30 )    Color: x / Appearance: x / SG: x / pH: x  Gluc: 157 mg/dL / Ketone: x  / Bili: x / Urobili: x   Blood: x / Protein: x / Nitrite: x   Leuk Esterase: x / RBC: x / WBC x   Sq Epi: x / Non Sq Epi: x / Bacteria: x      Medications:  atorvastatin 80 milliGRAM(s) Oral at bedtime  calamine/zinc oxide Lotion 1 Application(s) Topical three times a day  cefTRIAXone   IVPB 1000 milliGRAM(s) IV Intermittent every 24 hours  chlorhexidine 2% Cloths 1 Application(s) Topical <User Schedule>  clopidogrel Tablet 75 milliGRAM(s) Oral daily  dextrose 10% Bolus 125 milliLiter(s) IV Bolus once  dextrose 50% Injectable 12.5 Gram(s) IV Push once  dextrose 50% Injectable 25 Gram(s) IV Push once  glucagon  Injectable 1 milliGRAM(s) IntraMuscular once  heparin   Injectable 5000 Unit(s) SubCutaneous every 8 hours  insulin lispro (ADMELOG) corrective regimen sliding scale   SubCutaneous three times a day before meals  insulin lispro (ADMELOG) corrective regimen sliding scale   SubCutaneous at bedtime  levothyroxine 25 MICROGram(s) Oral daily  torsemide 10 milliGRAM(s) Oral daily    Drips:  dextrose 5%. 1000 milliLiter(s) (100 mL/Hr) IV Continuous <Continuous>  dextrose 5%. 1000 milliLiter(s) (50 mL/Hr) IV Continuous <Continuous>    PRN:     Allergies    No Known Allergies    Intolerances      Please contact me with any questions or concerns at [phone]. 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 Patient vitals in ED: BP 92/65 HR 74 Temp 98.4 Labs significant for Na 117, K 3.3 Cr 2.3 Lactate 1.2 . Patient was given 500 cc bolus NS and admitted for hyponatremia and JEMIMA.     Patient was hypotensive overnight  with BP 93/50 given 500 cc bolus and midodrine 10 mg without improvement. Patient put on Levophed and upgraded to cardiac stepdown. Patient BP at 7am 96/60 without symptoms of SOB, chest pain. off Levophed overnight.     Interval history: 58 y/o woman w PMHx of HTN, HLD, DM, CAD s/p stent, HFrEF 25%, prior ICD s/p removal due to infection, severe pulm HTN, follows w Cardio NP Cyn Manzano, COPD on 4L NC, CORNELIUS no CPAP, CKD baseline Cr 1.3, CVA w residual L sided weakness and baseline bedbound vs wheelchair reliant, hypothyroidism, presented to ED for 1 week of malaise/fatigue, decreased PO intake and insomnia, in ED found to have WBC 8.27, Na 117, K 3.3, Cr 2.3, lactate 1.2, , given 500cc NS, CXR unremarkable, admitted to medicine stepdown 24 for hyponatremia of 117 and JEMIMA.    States she lives at home w HHA helping w food and other home needs, as pt is bed/wheelchair reliant. States over past week she was not eating because she couldn't sleep. When asked how those two things are related, patient said "I don't know" and when asked if she had appetite, then said no. Unable to find any reason pt suddenly had insomnia; denies any new meds, new supplements, changes in medication doses.     Review of systems: A complete 10-point review of systems was obtained and is negative except as stated in the interval history.    Vitals:  T(F): 98, Max: 98.8 ( @ 00:03)  HR: 81 (81 - 96)  BP: 102/52 (74/46 - 117/58)  RR: 17 (15 - 20)  SpO2: 100% (100% - 100%)    Ins & outs:      @ 07:  -   @ 07:00  --------------------------------------------------------  IN: 740 mL / OUT: 400 mL / NET: 340 mL     @ 07:  -   @ 17:49  --------------------------------------------------------  IN: 240 mL / OUT: 0 mL / NET: 240 mL        Physical exam:  General: No apparent distress  HEENT: Anicteric sclera. Moist mucous membranes.   Cardiac: Regular rate and rhythm. No murmurs, rubs, or gallops.   Vascular: Symmetric radial pulses. Dorsalis pedis pulses palpable.   Respiratory: Normal effort. Bibasilar crackles. Clear to ascultation.   Abdomen: Soft, nontender. Audible bowel sounds.   Extremities: Warm with no edema. No cyanosis or clubbing.   Skin: Warm and dry. No rash.   Neurologic: Grossly normal motor function.   Psychiatric: Oriented to person, place, and time.     Data reviewed:  - Telemetry:   - EC24: NSR w/ 1st degree AV block   - TTE: 23: LV EF 25-30%. Moderate (grade II) diastolic dysfunction. Severely enlarged right ventricle (RVEDD = 5.3cm) with low-normal function. Moderately enlarged right atrium. Mildly enlarged left atrium. Moderate-severe tricuspid regurgitation with hepatic vein flow reversal. Mild aortic valve stenosis (Vmax 2.4m/s, mean PG 12mmHg, SHILPI 1.79cm2). Mild aortic regurgitation. The mitral valve leaflets are tethered with trace regurgitation. Severe pulmonary hypertension (PASP is at least 60mmHg). The IVC is dilated (2.8cm) with <50% collapsibility indicating increased right atrial pressure. PASP may be underestimated due to TR severity. Small pericardial effusion without echocardiographic signs of tamponade physiology.  - Chest x-ray 24: Cardiomegaly, unchanged. No acute infiltrates.  - Stress test: 21:   1. MODERATE REVERSIBLE DEFECT IN THE SEPTUM, ANTERIOR AND INFERIOR WALL OF THE LEFT VENTRICLE CONSISTENT WITH ISCHEMIA.  2. DILATED LEFT VENTRICLE WITH MARKED GLOBAL HYPOKINESIS. THERE IS INCOMPLETE THICKENING OF THE SEPTUM ANTERIOR AND INFERIOR WALL OF THE LEFT VENTRICLE RAISING QUESTION OF HIBERNATING MYOCARDIUM. THE LATERAL WALL OF THE LEFT VENTRICLE THICKENS NORMALLY.  3. LEFT VENTRICULAR EJECTION FRACTION OF <20 % WHICH IS VERY LOW. ECHOCARDIOGRAPHIC ESTIMATED EJECTION FRACTION 2021 WAS 35 TO 40%.  - CCTA:  - Cardiac catheterization:  RCA disease - RICARDO x 1  - Cardiac MRI:    - Labs:                        8.9    6.83  )-----------( 165      ( 2024 05:30 )             26.6     07-02    130<L>  |  94<L>  |  24<H>  ----------------------------<  157<H>  4.5   |  23  |  1.6<H>    Ca    8.9      2024 05:30  Phos  1.9     07-02  Mg     1.9     07-02    TPro  6.2  /  Alb  3.5  /  TBili  0.4  /  DBili  x   /  AST  12  /  ALT  10  /  AlkPhos  186<H>  07-02    Urinalysis Basic - ( 2024 05:30 )    Color: x / Appearance: x / SG: x / pH: x  Gluc: 157 mg/dL / Ketone: x  / Bili: x / Urobili: x   Blood: x / Protein: x / Nitrite: x   Leuk Esterase: x / RBC: x / WBC x   Sq Epi: x / Non Sq Epi: x / Bacteria: x      Medications:  atorvastatin 80 milliGRAM(s) Oral at bedtime  calamine/zinc oxide Lotion 1 Application(s) Topical three times a day  cefTRIAXone   IVPB 1000 milliGRAM(s) IV Intermittent every 24 hours  chlorhexidine 2% Cloths 1 Application(s) Topical <User Schedule>  clopidogrel Tablet 75 milliGRAM(s) Oral daily  dextrose 10% Bolus 125 milliLiter(s) IV Bolus once  dextrose 50% Injectable 12.5 Gram(s) IV Push once  dextrose 50% Injectable 25 Gram(s) IV Push once  glucagon  Injectable 1 milliGRAM(s) IntraMuscular once  heparin   Injectable 5000 Unit(s) SubCutaneous every 8 hours  insulin lispro (ADMELOG) corrective regimen sliding scale   SubCutaneous three times a day before meals  insulin lispro (ADMELOG) corrective regimen sliding scale   SubCutaneous at bedtime  levothyroxine 25 MICROGram(s) Oral daily  torsemide 10 milliGRAM(s) Oral daily    Drips:  dextrose 5%. 1000 milliLiter(s) (100 mL/Hr) IV Continuous <Continuous>  dextrose 5%. 1000 milliLiter(s) (50 mL/Hr) IV Continuous <Continuous>    PRN:     Allergies    No Known Allergies    Intolerances      Please contact me with any questions or concerns at [phone].

## 2024-07-02 NOTE — PROGRESS NOTE ADULT - ASSESSMENT
60 y/o woman w PMHx of HTN, HLD, DM, CAD s/p stent, HFrEF 25%, prior ICD s/p removal due to infection, severe pulm HTN, follows w Cardio NP Cyn Manzano, COPD on 4L NC, CORNELIUS no CPAP, CKD baseline Cr 1.3, CVA w residual L sided weakness and baseline bedbound vs wheelchair reliant, hypothyroidism, presented to ED for 1 week of malaise/fatigue, decreased PO intake and insomnia, in ED found to have WBC 8.27, Na 117, K 3.3, Cr 2.3, lactate 1.2, , given 500cc NS, CXR unremarkable, admitted to medicine stepdown 6/30/24 for hyponatremia of 117 and JEMIMA.    #Hyponatremia  #Hypokalemia   #Loss of appetite   #JEMIMA on CKD   DDx most consistent w hypovolemic hyponatremia 2/2 poor PO intake, though unclear why pt has poor PO intake.  - s/p IVF and electrolyte replacement   - work up as per primary team    #(+)UA   - Given ctx x1 dose in ED   - asymptomatic, monitor off abx     #CAD s/p stent  #HFrEF 25%  #prior ICD s/p removal due to infection  #severe pulm HTN  follows w Cardio NP Cyn Manzano  - Dapagliflozin and metoprolol continued; other meds held due to JEMIMA  - Cardio consulted   - Fluid restrict, daily weight, strict I/Os     #COPD on 4L NC - on supplemental O2 here, no wheezing   #CORNELIUS no CPAP - pt denies CPAP but outpatient notes document CPAP (?)     #Goals of care/advance care planning  - Palliative performance scale score:  - GOC: Pt showed poor insight into medical condition. Writer explained poor outcome of CPR in her condition and risks of anoxic brain damage. Writer asked pt to take some time to think this over. She said she would like to be FULL CODE. Pt gave permission for primary team to fill in her brother on her condition.   - HCP: pt's brother Pérez Ramos whose phone number she cannot recall. Writer found the following information on a past hospitalization -> Pérez Ramos [phone]  - Psychosocial support provided    Appreciate consult. Discussed with the primary team.    Doris Benson MD, [facility]

## 2024-07-02 NOTE — CHART NOTE - NSCHARTNOTEFT_GEN_A_CORE
58 y/o woman w PMHx of HTN, HLD, DM, CAD s/p stent, HFrEF 25%, prior ICD s/p removal due to infection, severe pulm HTN, follows w Cardio NP Cyn Manzano, COPD on 4L NC, CORNELIUS no CPAP, CKD baseline Cr 1.3, CVA w residual L sided weakness and baseline bedbound vs wheelchair reliant, hypothyroidism, presented to ED for 1 week of malaise/fatigue, decreased PO intake and insomnia, in ED found to have WBC 8.27, Na 117, K 3.3, Cr 2.3, lactate 1.2, , given 500cc NS, CXR unremarkable, admitted to medicine stepdown 6/30/24 for hyponatremia of 117 and JEMIMA.    States she lives at home w HHA helping w food and other home needs, as pt is bed/wheelchair reliant. States over past week she was not eating because she couldn't sleep. When asked how those two things are related, patient said "I don't know" and when asked if she had appetite, then said no. Unable to find any reason pt suddenly had insomnia; denies any new meds, new supplements, changes in medication doses.     ROS: Denies any new symptoms aside from fatigue/malaise, insomnia, and loss of appetite. Denies any CP, SOB, changes in urination, n/v/d, abd pain, leg swelling. No recent travel or sick contacts. No fevers, chills.      Hospital Course:  Patient is a 59 yoF with PMHx of HFrEF 25%, severe pulmonary HTN, DM, COPD on 4L NS, CORNELIUS, CKS baseline Cr 1.3 CVA with L sided lower extremity weakness who utilizes a wheelchair at baseline presented with decreased po intake and weakness. Patient vitals in ED: BP 92/65 HR 74 Temp 98.4 Labs significant for Na 117, K 3.3 Cr 2.3 Lactate 1.2 . Patient was given 500 cc bolus NS and admitted for hyponatremia and JEMIMA.     Patient was hypotensive overnight with BP 93/50 given 500 cc bolus and midodrine 10 mg without improvement. Patient put on Levophed and upgraded to cardiac stepdown. Patient BP at 7am 96/60 without symptoms of SOB, chest pain. off Levophed overnight. Will be downgraded to 3C    Assessment and Plan:  #Hypovolemic Shock - resolved    #Hyponatremia secondary to poor po intake? and diuretics  -Received fluids. off levophed  -Na improved. Normal TSH, pending cortisol   -pending TTE  -Holding Spironolactone and Entresto (due to current JEMIMA) - resume as tolerated   -downgrade to telemetry       #CAD s/p stent HFrEF 25%/ prior ICD  #Severe Pulm HTN  -Home meds held: Entresto, Spironolactone, Dapagliflozin  -C/w home meds: Metoprolol      #JEMIMA -prerenal  #Acute on Chronic  -Baseline Cr 1.3  -patient's Cr trending down from 2.3--> 1.7  -Continue to Home meds held: Entresto, Spironolactone, Dapagliflozin  -follow up with nephrology       #UTI  -asymptomatic  +UA s/p Ceftriaxone 1 dose   -Ucx: E.coli  -C/w Rocephin    #Excoriations diffusely on arms, legs, face, and genitals No plaques or raised lesions with central clearing  #Likely eczematous rash now with excoriations remaining  - derm evaluation done     GI prophylaxis: none  DVT prophylaxis: heparin  Full code

## 2024-07-02 NOTE — PROGRESS NOTE ADULT - ASSESSMENT
58 y/o woman w PMHx of HTN, HLD, DM, CAD s/p stent, HFrEF 25%, prior ICD s/p removal due to infection, severe pulm HTN, follows w Dr Ge COPD on 4L NC, CORNELIUS no CPAP, CKD baseline Cr 1.3, CVA w residual L sided weakness and baseline bedbound vs wheelchair reliant, hypothyroidism, presented to ED for 1 week of malaise/fatigue    # JEMIMA on CKD 3A due to diuresis poor po intake  # hyponatremia hypovolemic     - creat and sodium better today too   - no need for IVF   - repeat BMP tonight and in AM   - no need for D5w   - keep diuretics entresto and Farxiga on hold/ can start introducing diuretics back in AM   - strict I and O follow for signs of volume overload       will sign off recall PRN / call using TEAMS or on [phone]

## 2024-07-02 NOTE — PROGRESS NOTE ADULT - SUBJECTIVE AND OBJECTIVE BOX
Nephrology progress note    THIS IS AN INCOMPLETE NOTE . FULL NOTE TO FOLLOW SHORTLY    Patient is seen and examined, events over the last 24 h noted .    Allergies:  No Known Allergies    Hospital Medications:   MEDICATIONS  (STANDING):  atorvastatin 80 milliGRAM(s) Oral at bedtime  calamine/zinc oxide Lotion 1 Application(s) Topical three times a day  cefTRIAXone   IVPB 1000 milliGRAM(s) IV Intermittent every 24 hours  chlorhexidine 2% Cloths 1 Application(s) Topical <User Schedule>  clopidogrel Tablet 75 milliGRAM(s) Oral daily  dextrose 10% Bolus 125 milliLiter(s) IV Bolus once  dextrose 5%. 1000 milliLiter(s) (50 mL/Hr) IV Continuous <Continuous>  dextrose 5%. 1000 milliLiter(s) (100 mL/Hr) IV Continuous <Continuous>  dextrose 50% Injectable 25 Gram(s) IV Push once  dextrose 50% Injectable 12.5 Gram(s) IV Push once  glucagon  Injectable 1 milliGRAM(s) IntraMuscular once  heparin   Injectable 5000 Unit(s) SubCutaneous every 8 hours  insulin lispro (ADMELOG) corrective regimen sliding scale   SubCutaneous three times a day before meals  insulin lispro (ADMELOG) corrective regimen sliding scale   SubCutaneous at bedtime  lactated ringers. 500 milliLiter(s) (100 mL/Hr) IV Continuous <Continuous>  levothyroxine 25 MICROGram(s) Oral daily  norepinephrine Infusion 0.05 MICROgram(s)/kG/Min (8.44 mL/Hr) IV Continuous <Continuous>        VITALS:  T(F): 98.5 (07-02-24 @ 07:21), Max: 99.9 (07-01-24 @ 17:07)  HR: 94 (07-02-24 @ 07:41)  BP: 94/53 (07-02-24 @ 07:41)  RR: 20 (07-02-24 @ 07:41)  SpO2: 100% (07-02-24 @ 07:41)  Wt(kg): --    07-01 @ 07:01  -  07-02 @ 07:00  --------------------------------------------------------  IN: 740 mL / OUT: 400 mL / NET: 340 mL          PHYSICAL EXAM:  Constitutional: NAD  HEENT: anicteric sclera, oropharynx clear, MMM  Neck: No JVD  Respiratory: CTAB, no wheezes, rales or rhonchi  Cardiovascular: S1, S2, RRR  Gastrointestinal: BS+, soft, NT/ND  Extremities: No cyanosis or clubbing. No peripheral edema  :  No blandon.   Skin: No rashes    LABS:  07-02    130<L>  |  94<L>  |  24<H>  ----------------------------<  157<H>  4.5   |  23  |  1.6<H>    SODIUM TREND:  Sodium 130 [07-02 @ 05:30]  Sodium 127 [07-01 @ 18:17]  Sodium 126 [07-01 @ 05:46]  Sodium 119 [06-30 @ 17:47]  Sodium 117 [06-30 @ 12:20]    Creatinine Trend: 1.6<--, 1.5<--, 1.7<--, 2.3<--, 2.3<--    Ca    8.9      02 Jul 2024 05:30  Phos  1.9     07-02  Mg     1.9     07-02    TPro  6.2  /  Alb  3.5  /  TBili  0.4  /  DBili      /  AST  12  /  ALT  10  /  AlkPhos  186<H>  07-02                          8.9    6.83  )-----------( 165      ( 02 Jul 2024 05:30 )             26.6       Urine Studies:  Urinalysis Basic - ( 02 Jul 2024 05:30 )    Color:  / Appearance:  / SG:  / pH:   Gluc: 157 mg/dL / Ketone:   / Bili:  / Urobili:    Blood:  / Protein:  / Nitrite:    Leuk Esterase:  / RBC:  / WBC    Sq Epi:  / Non Sq Epi:  / Bacteria:       Sodium, Random Urine: <20.0 mmoL/L (07-01 @ 10:50)  Creatinine, Random Urine: 29 mg/dL (07-01 @ 10:50)  Protein/Creatinine Ratio Calculation: 0.3 Ratio (07-01 @ 10:50)  Potassium, Random Urine: 4 mmol/L (07-01 @ 10:50)  Osmolality, Random Urine: 131 mos/kg (07-01 @ 02:40)      Iron 72, TIBC 187, %sat 39      [07-01-24 @ 05:46]  Ferritin 769      [07-01-24 @ 05:46]  HbA1c 7.1      [02-22-20 @ 09:25]          RADIOLOGY & ADDITIONAL STUDIES:   Nephrology progress note  Patient is seen and examined, events over the last 24 h noted .  Lying in bed comfortable     Allergies:  No Known Allergies    Hospital Medications:   MEDICATIONS  (STANDING):  atorvastatin 80 milliGRAM(s) Oral at bedtime  calamine/zinc oxide Lotion 1 Application(s) Topical three times a day  cefTRIAXone   IVPB 1000 milliGRAM(s) IV Intermittent every 24 hours  clopidogrel Tablet 75 milliGRAM(s) Oral daily  glucagon  Injectable 1 milliGRAM(s) IntraMuscular once  heparin   Injectable 5000 Unit(s) SubCutaneous every 8 hours  insulin lispro (ADMELOG) corrective regimen sliding scale   SubCutaneous three times a day before meals  insulin lispro (ADMELOG) corrective regimen sliding scale   SubCutaneous at bedtime  lactated ringers. 500 milliLiter(s) (100 mL/Hr) IV Continuous <Continuous>  levothyroxine 25 MICROGram(s) Oral daily  norepinephrine Infusion 0.05 MICROgram(s)/kG/Min (8.44 mL/Hr) IV Continuous <Continuous>        VITALS:  T(F): 98.5 (07-02-24 @ 07:21), Max: 99.9 (07-01-24 @ 17:07)  HR: 94 (07-02-24 @ 07:41)  BP: 94/53 (07-02-24 @ 07:41)  RR: 20 (07-02-24 @ 07:41)  SpO2: 100% (07-02-24 @ 07:41)      07-01 @ 07:01  -  07-02 @ 07:00  --------------------------------------------------------  IN: 740 mL / OUT: 400 mL / NET: 340 mL          PHYSICAL EXAM:  Constitutional: NAD  Respiratory: CTAB  Cardiovascular: S1, S2, RRR  Gastrointestinal: BS+, soft, NT/ND  Extremities: No cyanosis or clubbing. No peripheral edema  :  No blandon.   Skin: No rashes    LABS:  07-02    130<L>  |  94<L>  |  24<H>  ----------------------------<  157<H>  4.5   |  23  |  1.6<H>  Creatinine Trend: 1.6<--, 1.5<--, 1.7<--, 2.3<--, 2.3<--  SODIUM TREND:  Sodium 130 [07-02 @ 05:30]  Sodium 127 [07-01 @ 18:17]  Sodium 126 [07-01 @ 05:46]  Sodium 119 [06-30 @ 17:47]  Sodium 117 [06-30 @ 12:20]    Creatinine Trend: 1.6<--, 1.5<--, 1.7<--, 2.3<--, 2.3<--    Ca    8.9      02 Jul 2024 05:30  Phos  1.9     07-02  Mg     1.9     07-02    TPro  6.2  /  Alb  3.5  /  TBili  0.4  /  DBili      /  AST  12  /  ALT  10  /  AlkPhos  186<H>  07-02                          8.9    6.83  )-----------( 165      ( 02 Jul 2024 05:30 )             26.6       Urine Studies:  Urinalysis Basic - ( 02 Jul 2024 05:30 )    Color:  / Appearance:  / SG:  / pH:   Gluc: 157 mg/dL / Ketone:   / Bili:  / Urobili:    Blood:  / Protein:  / Nitrite:    Leuk Esterase:  / RBC:  / WBC    Sq Epi:  / Non Sq Epi:  / Bacteria:       Sodium, Random Urine: <20.0 mmoL/L (07-01 @ 10:50)  Creatinine, Random Urine: 29 mg/dL (07-01 @ 10:50)  Protein/Creatinine Ratio Calculation: 0.3 Ratio (07-01 @ 10:50)  Potassium, Random Urine: 4 mmol/L (07-01 @ 10:50)  Osmolality, Random Urine: 131 mos/kg (07-01 @ 02:40)      Iron 72, TIBC 187, %sat 39      [07-01-24 @ 05:46]  Ferritin 769      [07-01-24 @ 05:46]  HbA1c 7.1      [02-22-20 @ 09:25]          RADIOLOGY & ADDITIONAL STUDIES:

## 2024-07-02 NOTE — PROGRESS NOTE ADULT - SUBJECTIVE AND OBJECTIVE BOX
HPI:  58 y/o woman w PMHx of HTN, HLD, DM, CAD s/p stent, HFrEF 25%, prior ICD s/p removal due to infection, severe pulm HTN, follows w Cardio NP Cyn Manzano, COPD on 4L NC, CORNELIUS no CPAP, CKD baseline Cr 1.3, CVA w residual L sided weakness and baseline bedbound vs wheelchair reliant, hypothyroidism, presented to ED for 1 week of malaise/fatigue, decreased PO intake and insomnia, in ED found to have WBC 8.27, Na 117, K 3.3, Cr 2.3, lactate 1.2, , given 500cc NS, CXR unremarkable, admitted to medicine stepdown 6/30/24 for hyponatremia of 117 and JEMIMA.  HCP brother Pérez Ramos whose phone number she cannot recall     Pt is a very brief historian - mostly asked for food and blanket during our conversation.   States she lives at home w HHA helping w food and other home needs, as pt is bed/wheelchair reliant. States over past week she was not eating because she couldn't sleep. When asked how those two things are related, patient said "I don't know" and when asked if she had appetite, then said no. Unable to find any reason pt suddenly had insomnia; denies any new meds, new supplements, changes in medication doses.   ROS: Denies any new symptoms aside from fatigue/malaise, insomnia, and loss of appetite. Denies any CP, SOB, changes in urination, n/v/d, abd pain, leg swelling. No recent travel or sick contacts. No fevers, chills.     Triage vitals were: 92/65, 74bpm, RR20, 100% on RA, 98.4  Repeat vitals notable for: 102/60, 67bpm, RR18, 98% on 4L   Labs notable for: WBC 8.27, Na 117, K 3.3, Cr 2.3, lactate 1.2, , UA(+)  Imaging: CXR unremarkable  Treatments: given 500cc NS  Pending: BNP, repeat BMP (30 Jun 2024 18:54)      Interval History    ADVANCE DIRECTIVES:    [ ] Full Code [ ] DNR  MOLST  [ ]  Living Will  [ ]   DECISION MAKER(s):  [ ] Health Care Proxy(s)  [ ] Surrogate(s)  [ ] Guardian           Name(s): Phone Number(s):    BASELINE (I)ADL(s) (prior to admission):  [place]: [ ]Total  [ ] Moderate [ ]Dependent  Palliative Performance Status Version 2:         %    [email or web address]/[email or web address]_performance_[email or web address]_[email or web address]    Allergies    No Known Allergies    Intolerances    MEDICATIONS  (STANDING):  atorvastatin 80 milliGRAM(s) Oral at bedtime  calamine/zinc oxide Lotion 1 Application(s) Topical three times a day  cefTRIAXone   IVPB 1000 milliGRAM(s) IV Intermittent every 24 hours  chlorhexidine 2% Cloths 1 Application(s) Topical <User Schedule>  clopidogrel Tablet 75 milliGRAM(s) Oral daily  dextrose 10% Bolus 125 milliLiter(s) IV Bolus once  dextrose 5%. 1000 milliLiter(s) (50 mL/Hr) IV Continuous <Continuous>  dextrose 5%. 1000 milliLiter(s) (100 mL/Hr) IV Continuous <Continuous>  dextrose 50% Injectable 25 Gram(s) IV Push once  dextrose 50% Injectable 12.5 Gram(s) IV Push once  glucagon  Injectable 1 milliGRAM(s) IntraMuscular once  heparin   Injectable 5000 Unit(s) SubCutaneous every 8 hours  insulin lispro (ADMELOG) corrective regimen sliding scale   SubCutaneous three times a day before meals  insulin lispro (ADMELOG) corrective regimen sliding scale   SubCutaneous at bedtime  levothyroxine 25 MICROGram(s) Oral daily  torsemide 10 milliGRAM(s) Oral daily    MEDICATIONS  (PRN):  acetaminophen     Tablet .. 650 milliGRAM(s) Oral every 6 hours PRN Temp greater or equal to 38C (100.4F), Mild Pain (1 - 3)  dextrose Oral Gel 15 Gram(s) Oral once PRN Blood Glucose LESS THAN 70 milliGRAM(s)/deciliter  melatonin 3 milliGRAM(s) Oral at bedtime PRN Insomnia    PRESENT SYMPTOMS: [ ]Unable to obtain due to poor mentation   Source if other than patient:  [ ]Family   [ ]Team     Pain: [ ]yes [ ]no  QOL impact -   Location -                    Aggravating factors -  Quality -  Radiation -  Timing-  Severity (0-10 scale):  Minimal acceptable level (0-10 scale):     CPOT:    https://www.[facility].org/getattachment/[email or web address]/Critical-Care-Pain-Observation-Tool-(CPOT)    PAIN AD Score:   http://geriatrictoolkit.[email or web address]/cog/painad.pdf (press ctrl +  left click to view)    Dyspnea:                           [ ]None[ ]Mild [ ]Moderate [ ]Severe     Respiratory Distress Observation Scale (RDOS):   A score of 0 to 2 signifies little or no respiratory distress, 3 signifies mild distress, scores 4 to 6 indicate moderate distress, and scores greater than 7 signify severe distress  [email or web address]/[email or web address]/[email or web address]    Anxiety:                             [ ]None[ ]Mild [ ]Moderate [ ]Severe   Fatigue:                             [ ]None[ ]Mild [ ]Moderate [ ]Severe   Nausea:                             [ ]None[ ]Mild [ ]Moderate [ ]Severe   Loss of appetite:              [ ]None[ ]Mild [ ]Moderate [ ]Severe   Constipation:                    [ ]None[ ]Mild [ ]Moderate [ ]Severe    Other Symptoms:  [ ]All other review of systems negative     Palliative Performance Status Version 2:         %    http://[facility].org/files/news/palliative_performance_scale_ppsv2.pdf  PHYSICAL EXAM:  Vital Signs Last 24 Hrs  T(C): 36.9 (02 Jul 2024 11:59), Max: 37.7 (01 Jul 2024 17:07)  T(F): 98.4 (02 Jul 2024 11:59), Max: 99.9 (01 Jul 2024 17:07)  HR: 86 (02 Jul 2024 11:59) (82 - 96)  BP: 117/58 (02 Jul 2024 11:59) (74/46 - 117/58)  BP(mean): 84 (02 Jul 2024 11:59) (56 - 84)  RR: 18 (02 Jul 2024 11:59) (15 - 20)  SpO2: 100% (02 Jul 2024 07:41) (100% - 100%)    Parameters below as of 02 Jul 2024 07:41  Patient On (Oxygen Delivery Method): nasal cannula  O2 Flow (L/min): 4   I&O's Summary    01 Jul 2024 07:01  -  02 Jul 2024 07:00  --------------------------------------------------------  IN: 740 mL / OUT: 400 mL / NET: 340 mL    02 Jul 2024 07:01  -  02 Jul 2024 16:36  --------------------------------------------------------  IN: 240 mL / OUT: 0 mL / NET: 240 mL        GENERAL:  [ ] No acute distress [ ]Lethargic  [ ]Unarousable  [ ]Verbal  [ ]Non-Verbal [ ]Cachexia    BEHAVIORAL/PSYCH:  [ ]Alert and Oriented x  [ ] Anxiety [ ] Delirium [ ] Agitation [ ] Calm   EYES: [ ] No scleral icterus [ ] Scleral icterus [ ] Closed  ENMT:  [ ]Dry mouth  [ ]No external oral lesions [ ] No external ear or nose lesions  CARDIOVASCULAR:  [ ]Regular [ ]Irregular [ ]Tachy [ ]Not Tachy  [ ]Rambo [ ] Edema [ ] No edema  PULMONARY:  [ ]Tachypnea  [ ]Audible excessive secretions [ ] No labored breathing [ ] labored breathing  GASTROINTESTINAL: [ ]Soft  [ ]Distended  [ ]Not distended [ ]Non tender [ ]Tender  MUSCULOSKELETAL: [ ]No clubbing [ ] clubbing  [ ] No cyanosis [ ] cyanosis  NEUROLOGIC: [ ]No focal deficits  [ ]Follows commands  [ ]Does not follow commands  [ ]Cognitive impairment  [ ]Dysphagia  [ ]Dysarthria  [ ]Paresis   SKIN: [ ] Jaundiced [ ] Non-jaundiced [ ]Rash [ ]No Rash [ ] Warm [ ] Dry  MISC/LINES: [ ] ET tube [ ] Trach [ ]NGT/OGT [ ]PEG [ ][place]    CRITICAL CARE:  [ ] Shock Present  [ ]Septic [ ]Cardiogenic [ ]Neurologic [ ]Hypovolemic  [ ]  Vasopressors [ ]  Inotropes   [ ]Respiratory failure present [ ]Mechanical ventilation [ ]Non-invasive ventilatory support [ ]High flow  [ ]Acute  [ ]Chronic [ ]Hypoxic  [ ]Hypercarbic [ ]Other  [ ]Other organ failure     LABS: reviewed by me                        8.9    6.83  )-----------( 165      ( 02 Jul 2024 05:30 )             26.6   07-02    130<L>  |  94<L>  |  24<H>  ----------------------------<  157<H>  4.5   |  23  |  1.6<H>    Ca    8.9      02 Jul 2024 05:30  Phos  1.9     07-02  Mg     1.9     07-02    TPro  6.2  /  Alb  3.5  /  TBili  0.4  /  DBili  x   /  AST  12  /  ALT  10  /  AlkPhos  186<H>  07-02      Urinalysis Basic - ( 02 Jul 2024 05:30 )    Color: x / Appearance: x / SG: x / pH: x  Gluc: 157 mg/dL / Ketone: x  / Bili: x / Urobili: x   Blood: x / Protein: x / Nitrite: x   Leuk Esterase: x / RBC: x / WBC x   Sq Epi: x / Non Sq Epi: x / Bacteria: x      RADIOLOGY & ADDITIONAL STUDIES: reviewed by me    EKG: reviewed by me      PROTEIN CALORIE MALNUTRITION PRESENT: [ ]mild [ ]moderate [ ]severe [ ]underweight [ ]morbid obesity  https://www.andeal.org/vault/2440/web/files/ONC/Table_Clinical%20Characteristics%20to%20Document%20Malnutrition-White%20JV%20et%20al%202012.pdf    Height (cm): 149.9 (11-10-23 @ 22:00)  Weight (kg): 108 (11-10-23 @ 22:00)  BMI (kg/m2): 48.1 (11-10-23 @ 22:00)    [ ]PPSV2 < or = to 30% [ ]significant weight loss  [ ]poor nutritional intake  [ ]anasarca      [ ]Artificial Nutrition      REFERRALS:   [ ]Chaplaincy  [ ]Hospice  [ ]Child Life  [ ]Social Work  [ ]Case management [ ]Holistic Therapy     Patient discussed with primary medical team MD  Palliative care education provided to patient and/or family    Goals of Care Document:    HPI:  58 y/o woman w PMHx of HTN, HLD, DM, CAD s/p stent, HFrEF 25%, prior ICD s/p removal due to infection, severe pulm HTN, follows w Cardio NP Cyn Manzano, COPD on 4L NC, CORNELIUS no CPAP, CKD baseline Cr 1.3, CVA w residual L sided weakness and baseline bedbound vs wheelchair reliant, hypothyroidism, presented to ED for 1 week of malaise/fatigue, decreased PO intake and insomnia, in ED found to have WBC 8.27, Na 117, K 3.3, Cr 2.3, lactate 1.2, , given 500cc NS, CXR unremarkable, admitted to medicine stepdown 6/30/24 for hyponatremia of 117 and JEMIMA.  HCP brother Pérez Ramos whose phone number she cannot recall     Pt is a very brief historian - mostly asked for food and blanket during our conversation.   States she lives at home w HHA helping w food and other home needs, as pt is bed/wheelchair reliant. States over past week she was not eating because she couldn't sleep. When asked how those two things are related, patient said "I don't know" and when asked if she had appetite, then said no. Unable to find any reason pt suddenly had insomnia; denies any new meds, new supplements, changes in medication doses.   ROS: Denies any new symptoms aside from fatigue/malaise, insomnia, and loss of appetite. Denies any CP, SOB, changes in urination, n/v/d, abd pain, leg swelling. No recent travel or sick contacts. No fevers, chills.     Triage vitals were: 92/65, 74bpm, RR20, 100% on RA, 98.4  Repeat vitals notable for: 102/60, 67bpm, RR18, 98% on 4L   Labs notable for: WBC 8.27, Na 117, K 3.3, Cr 2.3, lactate 1.2, , UA(+)  Imaging: CXR unremarkable  Treatments: given 500cc NS  Pending: BNP, repeat BMP (30 Jun 2024 18:54)      Interval History  more alert and conversant  itching improved with calamine lotion    ADVANCE DIRECTIVES:    [x ] Full Code [ ] DNR  MOLST  [ ]  Living Will  [ ]   DECISION MAKER(s):  [ x] Health Care Proxy(s)  [ ] Surrogate(s)  [ ] Guardian           Name(s): Phone Number(s):    BASELINE (I)ADL(s) (prior to admission):  [place]: [ ]Total  [ ] Moderate [ x]Dependent  Palliative Performance Status Version 2:  40       %    [email or web address]/files/news/palliative_performance_scale_[email or web address]    Allergies    No Known Allergies    Intolerances    MEDICATIONS  (STANDING):  atorvastatin 80 milliGRAM(s) Oral at bedtime  calamine/zinc oxide Lotion 1 Application(s) Topical three times a day  cefTRIAXone   IVPB 1000 milliGRAM(s) IV Intermittent every 24 hours  chlorhexidine 2% Cloths 1 Application(s) Topical <User Schedule>  clopidogrel Tablet 75 milliGRAM(s) Oral daily  dextrose 10% Bolus 125 milliLiter(s) IV Bolus once  dextrose 5%. 1000 milliLiter(s) (50 mL/Hr) IV Continuous <Continuous>  dextrose 5%. 1000 milliLiter(s) (100 mL/Hr) IV Continuous <Continuous>  dextrose 50% Injectable 25 Gram(s) IV Push once  dextrose 50% Injectable 12.5 Gram(s) IV Push once  glucagon  Injectable 1 milliGRAM(s) IntraMuscular once  heparin   Injectable 5000 Unit(s) SubCutaneous every 8 hours  insulin lispro (ADMELOG) corrective regimen sliding scale   SubCutaneous three times a day before meals  insulin lispro (ADMELOG) corrective regimen sliding scale   SubCutaneous at bedtime  levothyroxine 25 MICROGram(s) Oral daily  torsemide 10 milliGRAM(s) Oral daily    MEDICATIONS  (PRN):  acetaminophen     Tablet .. 650 milliGRAM(s) Oral every 6 hours PRN Temp greater or equal to 38C (100.4F), Mild Pain (1 - 3)  dextrose Oral Gel 15 Gram(s) Oral once PRN Blood Glucose LESS THAN 70 milliGRAM(s)/deciliter  melatonin 3 milliGRAM(s) Oral at bedtime PRN Insomnia    PRESENT SYMPTOMS: [ ]Unable to obtain due to poor mentation   Source if other than patient:  [ ]Family   [ ]Team     Pain: [ ]yes [ x]no  QOL impact -   Location -                    Aggravating factors -  Quality -  Radiation -  Timing-  Severity (0-10 scale):  Minimal acceptable level (0-10 scale):     CPOT:    https://www.[facility].org/getattachment/[email or web address]/Critical-Care-Pain-Observation-Tool-(CPOT)    PAIN AD Score:   http://geriatrictoolkit.[facility]/cog/painad.pdf (press ctrl +  left click to view)    Dyspnea:                           [x ]None[ ]Mild [ ]Moderate [ ]Severe     Respiratory Distress Observation Scale (RDOS):   A score of 0 to 2 signifies little or no respiratory distress, 3 signifies mild distress, scores 4 to 6 indicate moderate distress, and scores greater than 7 signify severe distress  [email or web address]://www.[facility].ca/sites/default/files/PDFS/[email or web address]-xfswbihzkih-yyejwoyo-xyukzrdkdgu-[email or web address].pdf    Anxiety:                             [x ]None[ ]Mild [ ]Moderate [ ]Severe   Fatigue:                             [x ]None[ ]Mild [ ]Moderate [ ]Severe   Nausea:                             [x ]None[ ]Mild [ ]Moderate [ ]Severe   Loss of appetite:              [x ]None[ ]Mild [ ]Moderate [ ]Severe   Constipation:                    [x ]None[ ]Mild [ ]Moderate [ ]Severe    Other Symptoms:  [x ]All other review of systems negative     Palliative Performance Status Version 2:         %    http://[facility].org/files/news/palliative_performance_scale_[email or web address].pdf  PHYSICAL EXAM:  Vital Signs Last 24 Hrs  T(C): 36.9 (02 Jul 2024 11:59), Max: 37.7 (01 Jul 2024 17:07)  T(F): 98.4 (02 Jul 2024 11:59), Max: 99.9 (01 Jul 2024 17:07)  HR: 86 (02 Jul 2024 11:59) (82 - 96)  BP: 117/58 (02 Jul 2024 11:59) (74/46 - 117/58)  BP(mean): 84 (02 Jul 2024 11:59) (56 - 84)  RR: 18 (02 Jul 2024 11:59) (15 - 20)  SpO2: 100% (02 Jul 2024 07:41) (100% - 100%)    Parameters below as of 02 Jul 2024 07:41  Patient On (Oxygen Delivery Method): nasal cannula  O2 Flow (L/min): 4   I&O's Summary    01 Jul 2024 07:01  -  02 Jul 2024 07:00  --------------------------------------------------------  IN: 740 mL / OUT: 400 mL / NET: 340 mL    02 Jul 2024 07:01  -  02 Jul 2024 16:36  --------------------------------------------------------  IN: 240 mL / OUT: 0 mL / NET: 240 mL    General: appears of appropriate age, lying in bed, NAD  HEENT: NCAT, anicteric sclerae, EOMI, PERRL, moist mucous membranes  Neck: Supple, nontender, no mass  Neurology: A&Ox3, cr. ns. grossly intact, nonfocal, sensation intact   Respiratory: CTA B/L, No W/R/R  CV: RRR, +S1/S2, no murmurs, rubs or gallops  Abdominal: Soft, NT, ND +BS, no palpable masses  Extremities: No cyanosis, no edema, + peripheral pulses  MSK: no joint erythema or warmth, no joint swelling   Heme: No obvious ecchymosis or petechiae   Skin: warm, dry, normal color  Psych: no agitation, normal affect    CRITICAL CARE:  [ ] Shock Present  [ ]Septic [ ]Cardiogenic [ ]Neurologic [ ]Hypovolemic  [ ]  Vasopressors [ ]  Inotropes   [x ]Respiratory failure present [ ]Mechanical ventilation [x ]Non-invasive ventilatory support [ ]High flow  [ ]Acute  [x ]Chronic [ ]Hypoxic  [ ]Hypercarbic [ ]Other  [ ]Other organ failure     LABS: reviewed by me                        8.9    6.83  )-----------( 165      ( 02 Jul 2024 05:30 )             26.6   07-02    130<L>  |  94<L>  |  24<H>  ----------------------------<  157<H>  4.5   |  23  |  1.6<H>    Ca    8.9      02 Jul 2024 05:30  Phos  1.9     07-02  Mg     1.9     07-02    TPro  6.2  /  Alb  3.5  /  TBili  0.4  /  DBili  x   /  AST  12  /  ALT  10  /  AlkPhos  186<H>  07-02      Urinalysis Basic - ( 02 Jul 2024 05:30 )    Color: x / Appearance: x / SG: x / pH: x  Gluc: 157 mg/dL / Ketone: x  / Bili: x / Urobili: x   Blood: x / Protein: x / Nitrite: x   Leuk Esterase: x / RBC: x / WBC x   Sq Epi: x / Non Sq Epi: x / Bacteria: x      RADIOLOGY & ADDITIONAL STUDIES: reviewed by me    EKG: reviewed by me      PROTEIN CALORIE MALNUTRITION PRESENT: [ ]mild [ ]moderate [ ]severe [ ]underweight [ ]morbid obesity  https://www.andeal.org/vault/2440/web/files/ONC/Table_Clinical%20Characteristics%20to%20Document%20Malnutrition-White%20JV%20et%20al%202012.pdf    Height (cm): 149.9 (11-10-23 @ 22:00)  Weight (kg): 108 (11-10-23 @ 22:00)  BMI (kg/m2): 48.1 (11-10-23 @ 22:00)    [ ]PPSV2 < or = to 30% [ ]significant weight loss  [ ]poor nutritional intake  [ ]anasarca      [ ]Artificial Nutrition      REFERRALS:   [ ]Chaplaincy  [ ]Hospice  [ ]Child Life  [ ]Social Work  [ ]Case management [ ]Holistic Therapy     Patient discussed with primary medical team MD  Palliative care education provided to patient and/or family    Goals of Care Document:

## 2024-07-02 NOTE — PROGRESS NOTE ADULT - CONVERSATION DETAILS
Reviewed patient's medical and social history as well as events leading to patient's hospitalization. Writer discussed patient's current diagnosis, medical condition and management, and prognosis. Inquired about patient's wishes regarding extent of medical care to be provided including escalation of medical care into the ICU and use of vasopressor support. In addition, the writer inquired about thoughts regarding cardiopulmonary resuscitation, artificial nutrition and hydration including use of feeding tubes and IVF, antibiotics, and further investigative studies such as blood draws and radiology. Pt showed poor insight into medical condition. All questions answered. Writer explained poor outcome of CPR in her condition and risks of anoxic brain damage. Writer asked pt to take some time to think this over. She said she would like to be FULL CODE. Pt gave permission for primary team to fill in her brother on her condition. He is her HCP. Psychosocial support provided.

## 2024-07-03 LAB
-  AMOXICILLIN/CLAVULANIC ACID: SIGNIFICANT CHANGE UP
-  AMPICILLIN/SULBACTAM: SIGNIFICANT CHANGE UP
-  AMPICILLIN: SIGNIFICANT CHANGE UP
-  AZTREONAM: SIGNIFICANT CHANGE UP
-  CEFAZOLIN: SIGNIFICANT CHANGE UP
-  CEFEPIME: SIGNIFICANT CHANGE UP
-  CEFOXITIN: SIGNIFICANT CHANGE UP
-  CEFTRIAXONE: SIGNIFICANT CHANGE UP
-  CEFUROXIME: SIGNIFICANT CHANGE UP
-  CIPROFLOXACIN: SIGNIFICANT CHANGE UP
-  ERTAPENEM: SIGNIFICANT CHANGE UP
-  GENTAMICIN: SIGNIFICANT CHANGE UP
-  IMIPENEM: SIGNIFICANT CHANGE UP
-  LEVOFLOXACIN: SIGNIFICANT CHANGE UP
-  MEROPENEM: SIGNIFICANT CHANGE UP
-  NITROFURANTOIN: SIGNIFICANT CHANGE UP
-  PIPERACILLIN/TAZOBACTAM: SIGNIFICANT CHANGE UP
-  TOBRAMYCIN: SIGNIFICANT CHANGE UP
-  TRIMETHOPRIM/SULFAMETHOXAZOLE: SIGNIFICANT CHANGE UP
ALBUMIN SERPL ELPH-MCNC: 3.5 G/DL — SIGNIFICANT CHANGE UP (ref 3.5–5.2)
ALP SERPL-CCNC: 177 U/L — HIGH (ref 30–115)
ALT FLD-CCNC: 10 U/L — SIGNIFICANT CHANGE UP (ref 0–41)
ANION GAP SERPL CALC-SCNC: 13 MMOL/L — SIGNIFICANT CHANGE UP (ref 7–14)
AST SERPL-CCNC: 13 U/L — SIGNIFICANT CHANGE UP (ref 0–41)
BASOPHILS # BLD AUTO: 0.05 K/UL — SIGNIFICANT CHANGE UP (ref 0–0.2)
BASOPHILS NFR BLD AUTO: 0.9 % — SIGNIFICANT CHANGE UP (ref 0–1)
BILIRUB SERPL-MCNC: 0.3 MG/DL — SIGNIFICANT CHANGE UP (ref 0.2–1.2)
BUN SERPL-MCNC: 20 MG/DL — SIGNIFICANT CHANGE UP (ref 10–20)
CALCIUM SERPL-MCNC: 9.2 MG/DL — SIGNIFICANT CHANGE UP (ref 8.4–10.4)
CHLORIDE SERPL-SCNC: 94 MMOL/L — LOW (ref 98–110)
CO2 SERPL-SCNC: 23 MMOL/L — SIGNIFICANT CHANGE UP (ref 17–32)
CREAT SERPL-MCNC: 1.4 MG/DL — SIGNIFICANT CHANGE UP (ref 0.7–1.5)
CULTURE RESULTS: ABNORMAL
EGFR: 44 ML/MIN/1.73M2 — LOW
EOSINOPHIL # BLD AUTO: 0.16 K/UL — SIGNIFICANT CHANGE UP (ref 0–0.7)
EOSINOPHIL NFR BLD AUTO: 2.7 % — SIGNIFICANT CHANGE UP (ref 0–8)
GLUCOSE BLDC GLUCOMTR-MCNC: 132 MG/DL — HIGH (ref 70–99)
GLUCOSE BLDC GLUCOMTR-MCNC: 145 MG/DL — HIGH (ref 70–99)
GLUCOSE BLDC GLUCOMTR-MCNC: 148 MG/DL — HIGH (ref 70–99)
GLUCOSE BLDC GLUCOMTR-MCNC: 166 MG/DL — HIGH (ref 70–99)
GLUCOSE SERPL-MCNC: 123 MG/DL — HIGH (ref 70–99)
HCT VFR BLD CALC: 25.7 % — LOW (ref 37–47)
HGB BLD-MCNC: 8.5 G/DL — LOW (ref 12–16)
LYMPHOCYTES # BLD AUTO: 1.09 K/UL — LOW (ref 1.2–3.4)
LYMPHOCYTES # BLD AUTO: 18.9 % — LOW (ref 20.5–51.1)
MAGNESIUM SERPL-MCNC: 1.7 MG/DL — LOW (ref 1.8–2.4)
MCHC RBC-ENTMCNC: 27.2 PG — SIGNIFICANT CHANGE UP (ref 27–31)
MCHC RBC-ENTMCNC: 33.1 G/DL — SIGNIFICANT CHANGE UP (ref 32–37)
MCV RBC AUTO: 82.1 FL — SIGNIFICANT CHANGE UP (ref 81–99)
METHOD TYPE: SIGNIFICANT CHANGE UP
MONOCYTES # BLD AUTO: 0.21 K/UL — SIGNIFICANT CHANGE UP (ref 0.1–0.6)
MONOCYTES NFR BLD AUTO: 3.6 % — SIGNIFICANT CHANGE UP (ref 1.7–9.3)
NEUTROPHILS # BLD AUTO: 4.01 K/UL — SIGNIFICANT CHANGE UP (ref 1.4–6.5)
NEUTROPHILS NFR BLD AUTO: 68.5 % — SIGNIFICANT CHANGE UP (ref 42.2–75.2)
ORGANISM # SPEC MICROSCOPIC CNT: ABNORMAL
ORGANISM # SPEC MICROSCOPIC CNT: SIGNIFICANT CHANGE UP
PHOSPHATE SERPL-MCNC: 2.1 MG/DL — SIGNIFICANT CHANGE UP (ref 2.1–4.9)
PLATELET # BLD AUTO: 150 K/UL — SIGNIFICANT CHANGE UP (ref 130–400)
PMV BLD: 10.3 FL — SIGNIFICANT CHANGE UP (ref 7.4–10.4)
POTASSIUM SERPL-MCNC: 4.4 MMOL/L — SIGNIFICANT CHANGE UP (ref 3.5–5)
POTASSIUM SERPL-SCNC: 4.4 MMOL/L — SIGNIFICANT CHANGE UP (ref 3.5–5)
PROT SERPL-MCNC: 6.1 G/DL — SIGNIFICANT CHANGE UP (ref 6–8)
RBC # BLD: 3.13 M/UL — LOW (ref 4.2–5.4)
RBC # FLD: 14.4 % — SIGNIFICANT CHANGE UP (ref 11.5–14.5)
SODIUM SERPL-SCNC: 130 MMOL/L — LOW (ref 135–146)
SPECIMEN SOURCE: SIGNIFICANT CHANGE UP
WBC # BLD: 5.78 K/UL — SIGNIFICANT CHANGE UP (ref 4.8–10.8)
WBC # FLD AUTO: 5.78 K/UL — SIGNIFICANT CHANGE UP (ref 4.8–10.8)

## 2024-07-03 PROCEDURE — 99232 SBSQ HOSP IP/OBS MODERATE 35: CPT

## 2024-07-03 RX ORDER — MAGNESIUM SULFATE 100 %
2 POWDER (GRAM) MISCELLANEOUS ONCE
Refills: 0 | Status: COMPLETED | OUTPATIENT
Start: 2024-07-03 | End: 2024-07-03

## 2024-07-03 RX ORDER — MAGNESIUM SULFATE 100 %
2 POWDER (GRAM) MISCELLANEOUS ONCE
Refills: 0 | Status: DISCONTINUED | OUTPATIENT
Start: 2024-07-03 | End: 2024-07-03

## 2024-07-03 RX ADMIN — HEPARIN SODIUM 5000 UNIT(S): 50 INJECTION, SOLUTION INTRAVENOUS at 22:09

## 2024-07-03 RX ADMIN — CLOPIDOGREL BISULFATE 75 MILLIGRAM(S): 75 TABLET, FILM COATED ORAL at 12:01

## 2024-07-03 RX ADMIN — Medication 1 APPLICATION(S): at 05:39

## 2024-07-03 RX ADMIN — HEPARIN SODIUM 5000 UNIT(S): 50 INJECTION, SOLUTION INTRAVENOUS at 02:41

## 2024-07-03 RX ADMIN — Medication 3 MILLIGRAM(S): at 02:25

## 2024-07-03 RX ADMIN — Medication 100 MILLIGRAM(S): at 12:00

## 2024-07-03 RX ADMIN — TORSEMIDE 10 MILLIGRAM(S): 20 TABLET ORAL at 05:40

## 2024-07-03 RX ADMIN — ATORVASTATIN CALCIUM 80 MILLIGRAM(S): 20 TABLET, FILM COATED ORAL at 22:08

## 2024-07-03 RX ADMIN — HEPARIN SODIUM 5000 UNIT(S): 50 INJECTION, SOLUTION INTRAVENOUS at 05:39

## 2024-07-03 RX ADMIN — Medication 25 MICROGRAM(S): at 05:40

## 2024-07-03 RX ADMIN — Medication 1 APPLICATION(S): at 22:08

## 2024-07-03 RX ADMIN — Medication 1 APPLICATION(S): at 17:43

## 2024-07-03 RX ADMIN — INSULIN LISPRO 1: 100 INJECTION, SOLUTION SUBCUTANEOUS at 12:05

## 2024-07-03 RX ADMIN — Medication 25 GRAM(S): at 06:48

## 2024-07-03 NOTE — SWALLOW BEDSIDE ASSESSMENT ADULT - SLP PERTINENT HISTORY OF CURRENT PROBLEM
Pt is a 58 y/o F w/ PMHx: HTN, HLD, DM, CAD s/p stent, HFrEF 25%, prior ICD s/p removal d/t infection, severe pulm HTN, follows w/ cardiology NP Cyn Manzano, COPD (on 4L NC), CORNELIUS (no CPAP), CKD (baseline Cr 1.3), CVA w/ residual L-sided weakness and baseline bedbound vs. wheelchair reliant, hypothyroidism, presented to ED for 1 week of malaise/fatigue, decreased PO intake and insomnia. Pt is being treated for hypovolemic shock, hyponatremia 2' poor po intake and diuretics, severe pulmonary HTN, JEMIMA, UTI. CXR 7/1-> Mild congestion. No consolidation, effusion or pneumothorax. Pt known to acute SLP service, hx of FEES November 2023, recs for regular, thin liquids via small sips, allow time for consecutive swallow; pt also seen this admission, recs for regular, thin liquids.

## 2024-07-03 NOTE — PROGRESS NOTE ADULT - SUBJECTIVE AND OBJECTIVE BOX
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 Patient vitals in ED: BP 92/65 HR 74 Temp 98.4 Labs significant for Na 117, K 3.3 Cr 2.3 Lactate 1.2 . Patient was given 500 cc bolus NS and admitted for hyponatremia and JEMIMA.  Pt was admitted to medicine stepdown 24 for hyponatremia of 117 and JEMIMA. Pt came to  on  for further management of GDMT medications.     Patient was hypotensive overnight  with BP 93/50 given 500 cc bolus and midodrine 10 mg without improvement. Patient put on Levophed and upgraded to cardiac stepdown. Patient BP at 7am 96/60 without symptoms of SOB, chest pain. off Levophed overnight.     Interval history: Pt was seen at bedside today in NAD. Pt denies SOB, palpitations, chest pain, syncope, and dizziness     Review of systems: A complete 10-point review of systems was obtained and is negative except as stated in the interval history.    Vitals:  Vital Signs Last 24 Hrs  T(C): 36.5 (2024 07:12), Max: 36.8 (2024 23:44)  T(F): 97.7 (2024 07:12), Max: 98.2 (2024 23:44)  HR: 71 (2024 07:12) (71 - 81)  BP: 102/56 (2024 07:12) (92/53 - 108/54)  BP(mean): 73 (2024 07:12) (67 - 78)  RR: 20 (2024 07:12) (11 - 20)  SpO2: 100% (2024 07:12) (100% - 100%)    Parameters below as of 2024 07:12  Patient On (Oxygen Delivery Method): nasal cannula  O2 Flow (L/min): 4      Physical exam:  General: No apparent distress  HEENT: Anicteric sclera. Moist mucous membranes.   Cardiac: Regular rate and rhythm. No murmurs, rubs, or gallops.   Vascular: Symmetric radial pulses. Dorsalis pedis pulses palpable.   Respiratory: Normal effort. Clear to ascultation.   Abdomen: Soft, nontender. Audible bowel sounds.   Extremities: Warm with no edema. No cyanosis or clubbing.   Skin: Warm and dry. No rash.   Neurologic: Grossly normal motor function.   Psychiatric: Oriented to person, place, and time.     Data reviewed:  - Telemetry:   NSR 76-92bpm   - EC24: NSR w/ 1st degree AV block   - TTE:   23: LV EF 25-30%. Moderate (grade II) diastolic dysfunction. Severely enlarged right ventricle (RVEDD = 5.3cm) with low-normal function. Moderately enlarged right atrium. Mildly enlarged left atrium. Moderate-severe tricuspid regurgitation with hepatic vein flow reversal. Mild aortic valve stenosis (Vmax 2.4m/s, mean PG 12mmHg, SHILPI 1.79cm2). Mild aortic regurgitation. The mitral valve leaflets are tethered with trace regurgitation. Severe pulmonary hypertension (PASP is at least 60mmHg). The IVC is dilated (2.8cm) with <50% collapsibility indicating increased right atrial pressure. PASP may be underestimated due to TR severity. Small pericardial effusion without echocardiographic signs of tamponade physiology.  - Chest x-ray:   24: Cardiomegaly, unchanged. No acute infiltrates.  - Stress test:   21:   1. MODERATE REVERSIBLE DEFECT IN THE SEPTUM, ANTERIOR AND INFERIOR WALL OF THE LEFT VENTRICLE CONSISTENT WITH ISCHEMIA.  2. DILATED LEFT VENTRICLE WITH MARKED GLOBAL HYPOKINESIS. THERE IS INCOMPLETE THICKENING OF THE SEPTUM ANTERIOR AND INFERIOR WALL OF THE LEFT VENTRICLE RAISING QUESTION OF HIBERNATING MYOCARDIUM. THE LATERAL WALL OF THE LEFT VENTRICLE THICKENS NORMALLY.  3. LEFT VENTRICULAR EJECTION FRACTION OF <20 % WHICH IS VERY LOW. ECHOCARDIOGRAPHIC ESTIMATED EJECTION FRACTION 2021 WAS 35 TO 40%.  - CCTA:  - Cardiac catheterization:    RCA disease - RICARDO x 1  - Cardiac MRI:    - Labs:                        8.5    5.78  )-----------( 150      ( 2024 05:02 )             25.7     07-03    130<L>  |  94<L>  |  20  ----------------------------<  123<H>  4.4   |  23  |  1.4    Ca    9.2      2024 05:02  Phos  2.1     07-  Mg     1.7     -    TPro  6.1  /  Alb  3.5  /  TBili  0.3  /  DBili  x   /  AST  13  /  ALT  10  /  AlkPhos  177<H>      LIVER FUNCTIONS - ( 2024 05:02 )  Alb: 3.5 g/dL / Pro: 6.1 g/dL / ALK PHOS: 177 U/L / ALT: 10 U/L / AST: 13 U/L / GGT: x           Lactate Trend   @ 12:20 Lactate:1.2     Urinalysis Basic - ( 2024 05:02 )    Color: x / Appearance: x / SG: x / pH: x  Gluc: 123 mg/dL / Ketone: x  / Bili: x / Urobili: x   Blood: x / Protein: x / Nitrite: x   Leuk Esterase: x / RBC: x / WBC x   Sq Epi: x / Non Sq Epi: x / Bacteria: x      Medications:  MEDICATIONS  (STANDING):  atorvastatin 80 milliGRAM(s) Oral at bedtime  calamine/zinc oxide Lotion 1 Application(s) Topical three times a day  chlorhexidine 2% Cloths 1 Application(s) Topical <User Schedule>  clopidogrel Tablet 75 milliGRAM(s) Oral daily  dextrose 10% Bolus 125 milliLiter(s) IV Bolus once  dextrose 5%. 1000 milliLiter(s) (100 mL/Hr) IV Continuous <Continuous>  dextrose 5%. 1000 milliLiter(s) (50 mL/Hr) IV Continuous <Continuous>  dextrose 50% Injectable 12.5 Gram(s) IV Push once  dextrose 50% Injectable 25 Gram(s) IV Push once  glucagon  Injectable 1 milliGRAM(s) IntraMuscular once  heparin   Injectable 5000 Unit(s) SubCutaneous every 8 hours  insulin lispro (ADMELOG) corrective regimen sliding scale   SubCutaneous three times a day before meals  insulin lispro (ADMELOG) corrective regimen sliding scale   SubCutaneous at bedtime  levothyroxine 25 MICROGram(s) Oral daily  torsemide 10 milliGRAM(s) Oral daily    MEDICATIONS  (PRN):  acetaminophen     Tablet .. 650 milliGRAM(s) Oral every 6 hours PRN Temp greater or equal to 38C (100.4F), Mild Pain (1 - 3)  dextrose Oral Gel 15 Gram(s) Oral once PRN Blood Glucose LESS THAN 70 milliGRAM(s)/deciliter  melatonin 3 milliGRAM(s) Oral at bedtime PRN Insomnia      PRN:     Allergies    No Known Allergies    Intolerances      Please contact me with any questions or concerns at [phone]. 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 Patient vitals in ED: BP 92/65 HR 74 Temp 98.4 Labs significant for Na 117, K 3.3 Cr 2.3 Lactate 1.2 . Patient was given 500 cc bolus NS and admitted for hyponatremia and JEMIMA.  Pt was admitted to medicine stepdown 24 for hyponatremia of 117 and JEMIMA. Pt came to  on  for further management of GDMT medications.     Patient was hypotensive overnight  with BP 93/50 given 500 cc bolus and midodrine 10 mg without improvement. Patient put on Levophed and upgraded to cardiac stepdown. Patient BP at 7am 96/60 without symptoms of SOB, chest pain. off Levophed overnight.     Interval history: Pt was seen at bedside today in NAD. Pt denies SOB, palpitations, chest pain, syncope, and dizziness     Review of systems: A complete 10-point review of systems was obtained and is negative except as stated in the interval history.    Vitals:  Vital Signs Last 24 Hrs  T(C): 36.5 (2024 07:12), Max: 36.8 (2024 23:44)  T(F): 97.7 (2024 07:12), Max: 98.2 (2024 23:44)  HR: 71 (2024 07:12) (71 - 81)  BP: 102/56 (2024 07:12) (92/53 - 108/54)  BP(mean): 73 (2024 07:12) (67 - 78)  RR: 20 (2024 07:12) (11 - 20)  SpO2: 100% (2024 07:12) (100% - 100%)    Parameters below as of 2024 07:12  Patient On (Oxygen Delivery Method): nasal cannula  O2 Flow (L/min): 4      Physical exam:  General: No apparent distress  HEENT: Anicteric sclera. Moist mucous membranes.   Cardiac: Regular rate and rhythm. No murmurs, rubs, or gallops.   Vascular: Symmetric radial pulses. Dorsalis pedis pulses palpable.   Respiratory: Normal effort. Clear to ascultation.   Abdomen: Soft, nontender. Audible bowel sounds.   Extremities: Warm with no edema. No cyanosis or clubbing.   Skin: Warm and dry. No rash.   Neurologic: Grossly normal motor function.   Psychiatric: Oriented to person, place, and time.     Data reviewed:  - Telemetry:   NSR 76-92bpm   - EC24: NSR w/ 1st degree AV block   - TTE:   23: LV EF 25-30%. Moderate (grade II) diastolic dysfunction. Severely enlarged right ventricle (RVEDD = 5.3cm) with low-normal function. Moderately enlarged right atrium. Mildly enlarged left atrium. Moderate-severe tricuspid regurgitation with hepatic vein flow reversal. Mild aortic valve stenosis (Vmax 2.4m/s, mean PG 12mmHg, SHILPI 1.79cm2). Mild aortic regurgitation. The mitral valve leaflets are tethered with trace regurgitation. Severe pulmonary hypertension (PASP is at least 60mmHg). The IVC is dilated (2.8cm) with <50% collapsibility indicating increased right atrial pressure. PASP may be underestimated due to TR severity. Small pericardial effusion without echocardiographic signs of tamponade physiology.  - Chest x-ray:   24: Cardiomegaly, unchanged. No acute infiltrates.  - Stress test:   21:   1. MODERATE REVERSIBLE DEFECT IN THE SEPTUM, ANTERIOR AND INFERIOR WALL OF THE LEFT VENTRICLE CONSISTENT WITH ISCHEMIA.  2. DILATED LEFT VENTRICLE WITH MARKED GLOBAL HYPOKINESIS. THERE IS INCOMPLETE THICKENING OF THE SEPTUM ANTERIOR AND INFERIOR WALL OF THE LEFT VENTRICLE RAISING QUESTION OF HIBERNATING MYOCARDIUM. THE LATERAL WALL OF THE LEFT VENTRICLE THICKENS NORMALLY.  3. LEFT VENTRICULAR EJECTION FRACTION OF <20 % WHICH IS VERY LOW. ECHOCARDIOGRAPHIC ESTIMATED EJECTION FRACTION 2021 WAS 35 TO 40%.  - CCTA:  - Cardiac catheterization:    RCA disease - RICARDO x 1  - Cardiac MRI:    - Labs:                        8.5    5.78  )-----------( 150      ( 2024 05:02 )             25.7     07-03    130<L>  |  94<L>  |  20  ----------------------------<  123<H>  4.4   |  23  |  1.4    Ca    9.2      2024 05:02  Phos  2.1     07-  Mg     1.7     -    TPro  6.1  /  Alb  3.5  /  TBili  0.3  /  DBili  x   /  AST  13  /  ALT  10  /  AlkPhos  177<H>      LIVER FUNCTIONS - ( 2024 05:02 )  Alb: 3.5 g/dL / Pro: 6.1 g/dL / ALK PHOS: 177 U/L / ALT: 10 U/L / AST: 13 U/L / GGT: x           Lactate Trend   @ 12:20 Lactate:1.2     Urinalysis Basic - ( 2024 05:02 )    Color: x / Appearance: x / SG: x / pH: x  Gluc: 123 mg/dL / Ketone: x  / Bili: x / Urobili: x   Blood: x / Protein: x / Nitrite: x   Leuk Esterase: x / RBC: x / WBC x   Sq Epi: x / Non Sq Epi: x / Bacteria: x      Medications:  MEDICATIONS  (STANDING):  atorvastatin 80 milliGRAM(s) Oral at bedtime  calamine/zinc oxide Lotion 1 Application(s) Topical three times a day  chlorhexidine 2% Cloths 1 Application(s) Topical <User Schedule>  clopidogrel Tablet 75 milliGRAM(s) Oral daily  dextrose 10% Bolus 125 milliLiter(s) IV Bolus once  dextrose 5%. 1000 milliLiter(s) (100 mL/Hr) IV Continuous <Continuous>  dextrose 5%. 1000 milliLiter(s) (50 mL/Hr) IV Continuous <Continuous>  dextrose 50% Injectable 12.5 Gram(s) IV Push once  dextrose 50% Injectable 25 Gram(s) IV Push once  glucagon  Injectable 1 milliGRAM(s) IntraMuscular once  heparin   Injectable 5000 Unit(s) SubCutaneous every 8 hours  insulin lispro (ADMELOG) corrective regimen sliding scale   SubCutaneous three times a day before meals  insulin lispro (ADMELOG) corrective regimen sliding scale   SubCutaneous at bedtime  levothyroxine 25 MICROGram(s) Oral daily  torsemide 10 milliGRAM(s) Oral daily    MEDICATIONS  (PRN):  acetaminophen     Tablet .. 650 milliGRAM(s) Oral every 6 hours PRN Temp greater or equal to 38C (100.4F), Mild Pain (1 - 3)  dextrose Oral Gel 15 Gram(s) Oral once PRN Blood Glucose LESS THAN 70 milliGRAM(s)/deciliter  melatonin 3 milliGRAM(s) Oral at bedtime PRN Insomnia      PRN:     Allergies    No Known Allergies    Intolerances      Please contact me with any questions or concerns at [phone].

## 2024-07-03 NOTE — PHYSICAL THERAPY INITIAL EVALUATION ADULT - GENERAL OBSERVATIONS, REHAB EVAL
13:40-13:50. Pt was approached for PT IE. Pt decline PT, B/S exercises or getting OOB 2/2 fatigue, despite max encouragement. easily triggered. requesting PT to follow up tomorrow. AMBER Mensah made aware.
13:20-13:50. chart reviewed. Pt received semi-murray at B/S, alert, oriented, able to follow multi-step instructions and agreeable to PT evaluation.  _ IV, + primafit, + monitoring, + O2 4 L via NC, VSS, L side weakness, denies pain or discomdofrt. NAD.

## 2024-07-03 NOTE — PHYSICAL THERAPY INITIAL EVALUATION ADULT - PERTINENT HX OF CURRENT PROBLEM, REHAB EVAL
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  Patient was given 500 cc bolus NS and admitted for hyponatremia and JEMIMA. Pt coming to stepdown 4T to 4T cardiac telemetry to restart home meds.

## 2024-07-03 NOTE — PHYSICAL THERAPY INITIAL EVALUATION ADULT - ADDITIONAL COMMENTS
Pt resides in 6th floor apt using elevator, no stairs to negotiate. Pt stated to be non ambulatory at baseline, able to transfer in/to chair using RW with assistance, + home attendant 8hrs X 7days, + home O2 4L.

## 2024-07-03 NOTE — SWALLOW BEDSIDE ASSESSMENT ADULT - SLP GENERAL OBSERVATIONS
pt received in bed awake alert w/o c/o pain. +4L NC
Received awake, alert, Generalized weakness, o2 NC, no c/o pain.

## 2024-07-03 NOTE — SWALLOW BEDSIDE ASSESSMENT ADULT - SWALLOW EVAL: DIAGNOSIS
+toleration for thin liquids, regular solids w/o any overt s/s aspiration/penetration
Toleration observed for regular solids with thin liquids without overt s/s of penetration/ aspiration.

## 2024-07-03 NOTE — PROGRESS NOTE ADULT - ASSESSMENT
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  Patient was given 500 cc bolus NS and admitted for hyponatremia and JEMIMA. Pt coming to stepdown 4T to 4T cardiac telemetry to restart home GDMT meds.     Assessment and Plan:  #Hypovolemic Shock   -resolved      #Hyponatremia secondary to poor po intake and diuretics  -Received fluids and off levophed  -Na improved. Normal TSH, pending cortisol   -pending TTE  -Holding Spironolactone and Entresto (due to current JEMIMA) - resume as tolerated   -downgraded to telemetry     #CAD s/p stent HFrEF 25%/ prior ICD      #Severe Pulm HTN  -Home meds held: Entresto, Spironolactone, Dapagliflozin, Metoprolol  -C/w home meds: Torsemide, atorvastatin   -Daily IVC:     #JEMIMA -prerenal  #Acute on Chronic  -Baseline Cr 1.3  -patient's Cr trending down from 2.3--> 1.7  -Continue to Home meds held: Entresto, Spironolactone, Dapagliflozin  -follow up with nephrology     #UTI  -asymptomatic  +UA s/p Ceftriaxone 1 dose   -Ucx: E.coli  -C/w Rocephin    #Hypothyroidism  -c/w synthroid 25mcg daily     #Excoriations diffusely on arms, legs, face, and genitals No plaques or raised lesions with central clearing  #Likely eczematous rash now with excoriations remaining  - derm evaluation done     #Misc  -PT consult pending     GI prophylaxis: none  DVT prophylaxis: heparin  Full code.   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  Patient was given 500 cc bolus NS and admitted for hyponatremia and JEMIMA. Pt coming to stepdown 4T to 4T cardiac telemetry to restart home GDMT meds.     Assessment and Plan:  #Hypovolemic Shock   -resolved      #Hyponatremia secondary to poor po intake and diuretics  -Received fluids and off levophed  -Na improved. Normal TSH, pending cortisol   -pending TTE   -Holding Spironolactone and Entresto (due to current JEMIMA) - resume as tolerated   -downgraded to telemetry     #CAD s/p stent HFrEF 25%/ prior ICD      #Severe Pulm HTN  -Home meds held: Entresto, Spironolactone, Dapagliflozin, Metoprolol  -C/w home meds: Torsemide, atorvastatin   -Daily IVC: 1.7 and collapsing     #JEMIMA -prerenal  #Acute on Chronic  -Baseline Cr 1.3  -patient's Cr trending down from 2.3--> 1.7  -Continue to Home meds held: Entresto, Spironolactone, Dapagliflozin  -follow up with nephrology     #UTI  -asymptomatic  +UA s/p Ceftriaxone 1 dose   -Ucx: E.coli  -C/w Rocephin    #Hypothyroidism  -c/w synthroid 25mcg daily     #Excoriations diffusely on arms, legs, face, and genitals No plaques or raised lesions with central clearing  #Likely eczematous rash now with excoriations remaining  - derm evaluation done     #Misc  -PT consult pending     GI prophylaxis: none  DVT prophylaxis: heparin  Full code.   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  Patient was given 500 cc bolus NS and admitted for hyponatremia and JEMIMA. Pt coming to stepdown 4T to 4T cardiac telemetry to restart home GDMT meds.     Assessment and Plan:  #Hypovolemic Shock   -resolved      #Hyponatremia secondary to poor po intake and diuretics  -Received fluids and off levophed  -Na improved. Normal TSH, pending cortisol   -pending TTE   -Holding Spironolactone and Entresto (due to current JEMIMA) - resume as tolerated   -downgraded to telemetry     #CAD s/p stent HFrEF 25%/ prior ICD      #Severe Pulm HTN  -Home meds held: Entresto, Spironolactone, Dapagliflozin, Metoprolol  -C/w home meds: Torsemide, atorvastatin   -Home dose Torsemide 20mg but 10mg was given   -Daily IVC: 1.7 and collapsing     #JEMIMA -prerenal  #Acute on Chronic  -Baseline Cr 1.3  -patient's Cr trending down from 2.3--> 1.7  -Continue to Home meds held: Entresto, Spironolactone, Dapagliflozin  -follow up with nephrology     #UTI  -asymptomatic  +UA s/p Ceftriaxone 1 dose   -Ucx: E.coli  -C/w Rocephin    #Hypothyroidism  -c/w synthroid 25mcg daily     #Excoriations diffusely on arms, legs, face, and genitals No plaques or raised lesions with central clearing  #Likely eczematous rash now with excoriations remaining  - derm evaluation done     #Misc  -PT consult pending     GI prophylaxis: none  DVT prophylaxis: heparin  Full code.

## 2024-07-03 NOTE — PHYSICAL THERAPY INITIAL EVALUATION ADULT - GAIT TRAINING, PT EVAL
Pt will ambulate using RW or least restrictive AD for 20 ft with CGA by discharge to facilitate return to PLOF.

## 2024-07-04 LAB
ALBUMIN SERPL ELPH-MCNC: 3.6 G/DL — SIGNIFICANT CHANGE UP (ref 3.5–5.2)
ALP SERPL-CCNC: 169 U/L — HIGH (ref 30–115)
ALT FLD-CCNC: 13 U/L — SIGNIFICANT CHANGE UP (ref 0–41)
ANION GAP SERPL CALC-SCNC: 14 MMOL/L — SIGNIFICANT CHANGE UP (ref 7–14)
AST SERPL-CCNC: 20 U/L — SIGNIFICANT CHANGE UP (ref 0–41)
BASOPHILS # BLD AUTO: 0.11 K/UL — SIGNIFICANT CHANGE UP (ref 0–0.2)
BASOPHILS NFR BLD AUTO: 2.2 % — HIGH (ref 0–1)
BILIRUB SERPL-MCNC: 0.3 MG/DL — SIGNIFICANT CHANGE UP (ref 0.2–1.2)
BUN SERPL-MCNC: 17 MG/DL — SIGNIFICANT CHANGE UP (ref 10–20)
CALCIUM SERPL-MCNC: 9.2 MG/DL — SIGNIFICANT CHANGE UP (ref 8.4–10.4)
CHLORIDE SERPL-SCNC: 95 MMOL/L — LOW (ref 98–110)
CO2 SERPL-SCNC: 24 MMOL/L — SIGNIFICANT CHANGE UP (ref 17–32)
CREAT SERPL-MCNC: 1.4 MG/DL — SIGNIFICANT CHANGE UP (ref 0.7–1.5)
EGFR: 44 ML/MIN/1.73M2 — LOW
EOSINOPHIL # BLD AUTO: 0.19 K/UL — SIGNIFICANT CHANGE UP (ref 0–0.7)
EOSINOPHIL NFR BLD AUTO: 3.8 % — SIGNIFICANT CHANGE UP (ref 0–8)
GLUCOSE BLDC GLUCOMTR-MCNC: 126 MG/DL — HIGH (ref 70–99)
GLUCOSE BLDC GLUCOMTR-MCNC: 141 MG/DL — HIGH (ref 70–99)
GLUCOSE BLDC GLUCOMTR-MCNC: 142 MG/DL — HIGH (ref 70–99)
GLUCOSE BLDC GLUCOMTR-MCNC: 146 MG/DL — HIGH (ref 70–99)
GLUCOSE SERPL-MCNC: 122 MG/DL — HIGH (ref 70–99)
HCT VFR BLD CALC: 26.8 % — LOW (ref 37–47)
HGB BLD-MCNC: 8.6 G/DL — LOW (ref 12–16)
IMM GRANULOCYTES NFR BLD AUTO: 1.6 % — HIGH (ref 0.1–0.3)
LYMPHOCYTES # BLD AUTO: 1.28 K/UL — SIGNIFICANT CHANGE UP (ref 1.2–3.4)
LYMPHOCYTES # BLD AUTO: 25.7 % — SIGNIFICANT CHANGE UP (ref 20.5–51.1)
MAGNESIUM SERPL-MCNC: 1.7 MG/DL — LOW (ref 1.8–2.4)
MCHC RBC-ENTMCNC: 27.1 PG — SIGNIFICANT CHANGE UP (ref 27–31)
MCHC RBC-ENTMCNC: 32.1 G/DL — SIGNIFICANT CHANGE UP (ref 32–37)
MCV RBC AUTO: 84.5 FL — SIGNIFICANT CHANGE UP (ref 81–99)
MONOCYTES # BLD AUTO: 0.53 K/UL — SIGNIFICANT CHANGE UP (ref 0.1–0.6)
MONOCYTES NFR BLD AUTO: 10.6 % — HIGH (ref 1.7–9.3)
NEUTROPHILS # BLD AUTO: 2.8 K/UL — SIGNIFICANT CHANGE UP (ref 1.4–6.5)
NEUTROPHILS NFR BLD AUTO: 56.1 % — SIGNIFICANT CHANGE UP (ref 42.2–75.2)
NRBC # BLD: 0 /100 WBCS — SIGNIFICANT CHANGE UP (ref 0–0)
PLATELET # BLD AUTO: 147 K/UL — SIGNIFICANT CHANGE UP (ref 130–400)
PMV BLD: 9.9 FL — SIGNIFICANT CHANGE UP (ref 7.4–10.4)
POTASSIUM SERPL-MCNC: 4.9 MMOL/L — SIGNIFICANT CHANGE UP (ref 3.5–5)
POTASSIUM SERPL-SCNC: 4.9 MMOL/L — SIGNIFICANT CHANGE UP (ref 3.5–5)
PROT SERPL-MCNC: 6.2 G/DL — SIGNIFICANT CHANGE UP (ref 6–8)
RBC # BLD: 3.17 M/UL — LOW (ref 4.2–5.4)
RBC # FLD: 14.5 % — SIGNIFICANT CHANGE UP (ref 11.5–14.5)
SODIUM SERPL-SCNC: 133 MMOL/L — LOW (ref 135–146)
WBC # BLD: 4.99 K/UL — SIGNIFICANT CHANGE UP (ref 4.8–10.8)
WBC # FLD AUTO: 4.99 K/UL — SIGNIFICANT CHANGE UP (ref 4.8–10.8)

## 2024-07-04 PROCEDURE — 99232 SBSQ HOSP IP/OBS MODERATE 35: CPT

## 2024-07-04 RX ORDER — METOPROLOL TARTRATE 50 MG
25 TABLET ORAL DAILY
Refills: 0 | Status: DISCONTINUED | OUTPATIENT
Start: 2024-07-04 | End: 2024-07-11

## 2024-07-04 RX ADMIN — HEPARIN SODIUM 5000 UNIT(S): 50 INJECTION, SOLUTION INTRAVENOUS at 13:07

## 2024-07-04 RX ADMIN — Medication 25 MICROGRAM(S): at 05:46

## 2024-07-04 RX ADMIN — Medication 1 APPLICATION(S): at 05:45

## 2024-07-04 RX ADMIN — Medication 1 APPLICATION(S): at 21:13

## 2024-07-04 RX ADMIN — Medication 1 APPLICATION(S): at 05:46

## 2024-07-04 RX ADMIN — Medication 1 APPLICATION(S): at 13:07

## 2024-07-04 RX ADMIN — CLOPIDOGREL BISULFATE 75 MILLIGRAM(S): 75 TABLET, FILM COATED ORAL at 12:07

## 2024-07-04 RX ADMIN — HEPARIN SODIUM 5000 UNIT(S): 50 INJECTION, SOLUTION INTRAVENOUS at 05:46

## 2024-07-04 RX ADMIN — HEPARIN SODIUM 5000 UNIT(S): 50 INJECTION, SOLUTION INTRAVENOUS at 21:15

## 2024-07-04 RX ADMIN — ATORVASTATIN CALCIUM 80 MILLIGRAM(S): 20 TABLET, FILM COATED ORAL at 21:13

## 2024-07-04 NOTE — CHART NOTE - NSCHARTNOTEFT_GEN_A_CORE
Consult received for "MST Score = />2." Upon chart review, patient with stable weight, does not currently meet criteria for Protein Calorie Malnutrition risk per MST. RD remains available for assessment per protocol or as needed.    Dimas Wilkins [phone] or via Teams

## 2024-07-04 NOTE — PROGRESS NOTE ADULT - SUBJECTIVE AND OBJECTIVE BOX
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 Patient vitals in ED: BP 92/65 HR 74 Temp 98.4 Labs significant for Na 117, K 3.3 Cr 2.3 Lactate 1.2 . Patient was given 500 cc bolus NS and admitted for hyponatremia and JEMIMA.  Pt was admitted to medicine stepdown 24 for hyponatremia of 117 and JEMIMA. Pt came to  on  for further management of GDMT medications.     Patient was hypotensive overnight  with BP 93/50 given 500 cc bolus and midodrine 10 mg without improvement. Patient put on Levophed and upgraded to cardiac stepdown. Patient BP at 7am 96/60 without symptoms of SOB, chest pain. off Levophed overnight.     Interval history: Pt was seen at bedside today in NAD. Pt denies SOB, palpitations, chest pain, syncope, and dizziness     Review of systems: A complete 10-point review of systems was obtained and is negative except as stated in the interval history.    Vitals:  Vital Signs Last 24 Hrs  T(C): 36.5 (2024 07:12), Max: 36.8 (2024 23:44)  T(F): 97.7 (2024 07:12), Max: 98.2 (2024 23:44)  HR: 71 (2024 07:12) (71 - 81)  BP: 102/56 (2024 07:12) (92/53 - 108/54)  BP(mean): 73 (2024 07:12) (67 - 78)  RR: 20 (2024 07:12) (11 - 20)  SpO2: 100% (2024 07:12) (100% - 100%)    Parameters below as of 2024 07:12  Patient On (Oxygen Delivery Method): nasal cannula  O2 Flow (L/min): 4      Physical exam:  General: No apparent distress  HEENT: Anicteric sclera. Moist mucous membranes.   Cardiac: Regular rate and rhythm. No murmurs, rubs, or gallops.   Vascular: Symmetric radial pulses. Dorsalis pedis pulses palpable.   Respiratory: Normal effort. Clear to ascultation.   Abdomen: Soft, nontender. Audible bowel sounds.   Extremities: Warm with no edema. No cyanosis or clubbing.   Skin: Warm and dry. No rash.   Neurologic: Grossly normal motor function.   Psychiatric: Oriented to person, place, and time.     Data reviewed:  - Telemetry:   NSR 76-92bpm   - EC24: NSR w/ 1st degree AV block   - TTE:   23: LV EF 25-30%. Moderate (grade II) diastolic dysfunction. Severely enlarged right ventricle (RVEDD = 5.3cm) with low-normal function. Moderately enlarged right atrium. Mildly enlarged left atrium. Moderate-severe tricuspid regurgitation with hepatic vein flow reversal. Mild aortic valve stenosis (Vmax 2.4m/s, mean PG 12mmHg, SHILPI 1.79cm2). Mild aortic regurgitation. The mitral valve leaflets are tethered with trace regurgitation. Severe pulmonary hypertension (PASP is at least 60mmHg). The IVC is dilated (2.8cm) with <50% collapsibility indicating increased right atrial pressure. PASP may be underestimated due to TR severity. Small pericardial effusion without echocardiographic signs of tamponade physiology.  - Chest x-ray:   24: Cardiomegaly, unchanged. No acute infiltrates.  - Stress test:   21:   1. MODERATE REVERSIBLE DEFECT IN THE SEPTUM, ANTERIOR AND INFERIOR WALL OF THE LEFT VENTRICLE CONSISTENT WITH ISCHEMIA.  2. DILATED LEFT VENTRICLE WITH MARKED GLOBAL HYPOKINESIS. THERE IS INCOMPLETE THICKENING OF THE SEPTUM ANTERIOR AND INFERIOR WALL OF THE LEFT VENTRICLE RAISING QUESTION OF HIBERNATING MYOCARDIUM. THE LATERAL WALL OF THE LEFT VENTRICLE THICKENS NORMALLY.  3. LEFT VENTRICULAR EJECTION FRACTION OF <20 % WHICH IS VERY LOW. ECHOCARDIOGRAPHIC ESTIMATED EJECTION FRACTION 2021 WAS 35 TO 40%.  - CCTA:  - Cardiac catheterization:    RCA disease - RICARDO x 1  - Cardiac MRI:    - Labs:                        8.5    5.78  )-----------( 150      ( 2024 05:02 )             25.7     07-03    130<L>  |  94<L>  |  20  ----------------------------<  123<H>  4.4   |  23  |  1.4    Ca    9.2      2024 05:02  Phos  2.1     07-  Mg     1.7     -    TPro  6.1  /  Alb  3.5  /  TBili  0.3  /  DBili  x   /  AST  13  /  ALT  10  /  AlkPhos  177<H>      LIVER FUNCTIONS - ( 2024 05:02 )  Alb: 3.5 g/dL / Pro: 6.1 g/dL / ALK PHOS: 177 U/L / ALT: 10 U/L / AST: 13 U/L / GGT: x           Lactate Trend   @ 12:20 Lactate:1.2     Urinalysis Basic - ( 2024 05:02 )    Color: x / Appearance: x / SG: x / pH: x  Gluc: 123 mg/dL / Ketone: x  / Bili: x / Urobili: x   Blood: x / Protein: x / Nitrite: x   Leuk Esterase: x / RBC: x / WBC x   Sq Epi: x / Non Sq Epi: x / Bacteria: x      Medications:  MEDICATIONS  (STANDING):  atorvastatin 80 milliGRAM(s) Oral at bedtime  calamine/zinc oxide Lotion 1 Application(s) Topical three times a day  chlorhexidine 2% Cloths 1 Application(s) Topical <User Schedule>  clopidogrel Tablet 75 milliGRAM(s) Oral daily  dextrose 10% Bolus 125 milliLiter(s) IV Bolus once  dextrose 5%. 1000 milliLiter(s) (100 mL/Hr) IV Continuous <Continuous>  dextrose 5%. 1000 milliLiter(s) (50 mL/Hr) IV Continuous <Continuous>  dextrose 50% Injectable 12.5 Gram(s) IV Push once  dextrose 50% Injectable 25 Gram(s) IV Push once  glucagon  Injectable 1 milliGRAM(s) IntraMuscular once  heparin   Injectable 5000 Unit(s) SubCutaneous every 8 hours  insulin lispro (ADMELOG) corrective regimen sliding scale   SubCutaneous three times a day before meals  insulin lispro (ADMELOG) corrective regimen sliding scale   SubCutaneous at bedtime  levothyroxine 25 MICROGram(s) Oral daily  torsemide 10 milliGRAM(s) Oral daily    MEDICATIONS  (PRN):  acetaminophen     Tablet .. 650 milliGRAM(s) Oral every 6 hours PRN Temp greater or equal to 38C (100.4F), Mild Pain (1 - 3)  dextrose Oral Gel 15 Gram(s) Oral once PRN Blood Glucose LESS THAN 70 milliGRAM(s)/deciliter  melatonin 3 milliGRAM(s) Oral at bedtime PRN Insomnia      PRN:     Allergies    No Known Allergies    Intolerances      Please contact me with any questions or concerns at [phone]. 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 Patient vitals in ED: BP 92/65 HR 74 Temp 98.4 Labs significant for Na 117, K 3.3 Cr 2.3 Lactate 1.2 . Patient was given 500 cc bolus NS and admitted for hyponatremia and JEMIMA.  Pt was admitted to medicine stepdown 24 for hyponatremia of 117 and JEMIMA. Pt came to  on  for further management of GDMT medications.     Patient was hypotensive overnight  with BP 93/50 given 500 cc bolus and midodrine 10 mg without improvement. Patient put on Levophed and upgraded to cardiac stepdown. Patient BP at 7am 96/60 without symptoms of SOB, chest pain. off Levophed overnight.     Interval history: Pt was seen at bedside today in NAD. Pt denies SOB, palpitations, chest pain, syncope, and dizziness     Review of systems: A complete 10-point review of systems was obtained and is negative except as stated in the interval history.    Vitals:  Vital Signs Last 24 Hrs  T(C): 36.9 (2024 16:03), Max: 36.9 (2024 16:03)  T(F): 98.5 (2024 16:03), Max: 98.5 (2024 16:03)  HR: 78 (2024 16:03) (71 - 83)  BP: 93/54 (2024 16:03) (93/54 - 117/57)  BP(mean): 68 (2024 16:03) (68 - 81)  RR: 17 (2024 16:03) (17 - 20)  SpO2: 100% (2024 16:03) (100% - 100%)    Parameters below as of 2024 07:51  Patient On (Oxygen Delivery Method): nasal cannula  O2 Flow (L/min): 4      Physical exam:  General: No apparent distress  HEENT: Anicteric sclera. Moist mucous membranes.   Cardiac: Regular rate and rhythm. No murmurs, rubs, or gallops.   Vascular: Symmetric radial pulses. Dorsalis pedis pulses palpable.   Respiratory: Normal effort. Clear to ascultation.   Abdomen: Soft, nontender. Audible bowel sounds.   Extremities: Warm with no edema. No cyanosis or clubbing.   Skin: Warm and dry. No rash.   Neurologic: Grossly normal motor function.   Psychiatric: Oriented to person, place, and time.     Data reviewed:  - Telemetry:   NSR w/ 1st degree heart block  bpm   - EC24: NSR w/ 1st degree AV block   - TTE:   23: LV EF 25-30%. Moderate (grade II) diastolic dysfunction. Severely enlarged right ventricle (RVEDD = 5.3cm) with low-normal function. Moderately enlarged right atrium. Mildly enlarged left atrium. Moderate-severe tricuspid regurgitation with hepatic vein flow reversal. Mild aortic valve stenosis (Vmax 2.4m/s, mean PG 12mmHg, SHILPI 1.79cm2). Mild aortic regurgitation. The mitral valve leaflets are tethered with trace regurgitation. Severe pulmonary hypertension (PASP is at least 60mmHg). The IVC is dilated (2.8cm) with <50% collapsibility indicating increased right atrial pressure. PASP may be underestimated due to TR severity. Small pericardial effusion without echocardiographic signs of tamponade physiology.  - Chest x-ray:   24: Cardiomegaly, unchanged. No acute infiltrates.  - Stress test:   21:   1. MODERATE REVERSIBLE DEFECT IN THE SEPTUM, ANTERIOR AND INFERIOR WALL OF THE LEFT VENTRICLE CONSISTENT WITH ISCHEMIA.  2. DILATED LEFT VENTRICLE WITH MARKED GLOBAL HYPOKINESIS. THERE IS INCOMPLETE THICKENING OF THE SEPTUM ANTERIOR AND INFERIOR WALL OF THE LEFT VENTRICLE RAISING QUESTION OF HIBERNATING MYOCARDIUM. THE LATERAL WALL OF THE LEFT VENTRICLE THICKENS NORMALLY.  3. LEFT VENTRICULAR EJECTION FRACTION OF <20 % WHICH IS VERY LOW. ECHOCARDIOGRAPHIC ESTIMATED EJECTION FRACTION 2021 WAS 35 TO 40%.  - CCTA:  - Cardiac catheterization:    RCA disease - RICARDO x 1  - Cardiac MRI:    - Labs:                        8.6    4.99  )-----------( 147      ( 2024 05:00 )             26.8     07-04    133<L>  |  95<L>  |  17  ----------------------------<  122<H>  4.9   |  24  |  1.4    Ca    9.2      2024 05:00  Phos  2.1     07-  Mg     1.7     -    TPro  6.2  /  Alb  3.6  /  TBili  0.3  /  DBili  x   /  AST  20  /  ALT  13  /  AlkPhos  169<H>  -04    LIVER FUNCTIONS - ( 2024 05:00 )  Alb: 3.6 g/dL / Pro: 6.2 g/dL / ALK PHOS: 169 U/L / ALT: 13 U/L / AST: 20 U/L / GGT: x           Lactate Trend   @ 12:20 Lactate:1.2     Urinalysis Basic - ( 2024 05:00 )    Color: x / Appearance: x / SG: x / pH: x  Gluc: 122 mg/dL / Ketone: x  / Bili: x / Urobili: x   Blood: x / Protein: x / Nitrite: x   Leuk Esterase: x / RBC: x / WBC x   Sq Epi: x / Non Sq Epi: x / Bacteria: x          Medications:  MEDICATIONS  (STANDING):  atorvastatin 80 milliGRAM(s) Oral at bedtime  calamine/zinc oxide Lotion 1 Application(s) Topical three times a day  chlorhexidine 2% Cloths 1 Application(s) Topical <User Schedule>  clopidogrel Tablet 75 milliGRAM(s) Oral daily  dextrose 10% Bolus 125 milliLiter(s) IV Bolus once  dextrose 5%. 1000 milliLiter(s) (50 mL/Hr) IV Continuous <Continuous>  dextrose 5%. 1000 milliLiter(s) (100 mL/Hr) IV Continuous <Continuous>  dextrose 50% Injectable 12.5 Gram(s) IV Push once  dextrose 50% Injectable 25 Gram(s) IV Push once  glucagon  Injectable 1 milliGRAM(s) IntraMuscular once  heparin   Injectable 5000 Unit(s) SubCutaneous every 8 hours  insulin lispro (ADMELOG) corrective regimen sliding scale   SubCutaneous three times a day before meals  insulin lispro (ADMELOG) corrective regimen sliding scale   SubCutaneous at bedtime  levothyroxine 25 MICROGram(s) Oral daily  metoprolol succinate ER 25 milliGRAM(s) Oral daily    MEDICATIONS  (PRN):  acetaminophen     Tablet .. 650 milliGRAM(s) Oral every 6 hours PRN Temp greater or equal to 38C (100.4F), Mild Pain (1 - 3)  dextrose Oral Gel 15 Gram(s) Oral once PRN Blood Glucose LESS THAN 70 milliGRAM(s)/deciliter  melatonin 3 milliGRAM(s) Oral at bedtime PRN Insomnia      PRN:     Allergies    No Known Allergies    Intolerances      Please contact me with any questions or concerns at [phone].

## 2024-07-04 NOTE — PROGRESS NOTE ADULT - ASSESSMENT
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  Patient was given 500 cc bolus NS and admitted for hyponatremia and JEMIMA. Pt coming to stepdown 4T to 4T cardiac telemetry to restart home GDMT meds.     Assessment and Plan:  #Hypovolemic Shock   -resolved      #Hyponatremia secondary to poor po intake and diuretics  -Received fluids and off levophed  -Na improved. Normal TSH, pending cortisol   -pending TTE   -Holding Spironolactone and Entresto (due to current JEMIMA) - resume as tolerated   -downgraded to telemetry     #CAD s/p stent HFrEF 25%/ prior ICD      #Severe Pulm HTN  -Home meds held: Entresto, Spironolactone, Dapagliflozin, Metoprolol  -C/w home meds: Torsemide, atorvastatin   -Home dose Torsemide 20mg but 10mg was given   -Daily IVC: 1.7 and collapsing     #JEMIMA -prerenal  #Acute on Chronic  -Baseline Cr 1.3  -patient's Cr trending down from 2.3--> 1.7  -Continue to Home meds held: Entresto, Spironolactone, Dapagliflozin  -follow up with nephrology     #UTI  -asymptomatic  +UA s/p Ceftriaxone 1 dose   -Ucx: E.coli  -C/w Rocephin    #Hypothyroidism  -c/w synthroid 25mcg daily     #Excoriations diffusely on arms, legs, face, and genitals No plaques or raised lesions with central clearing  #Likely eczematous rash now with excoriations remaining  - derm evaluation done     #Misc  -PT consult pending     GI prophylaxis: none  DVT prophylaxis: heparin  Full code.   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  Patient was given 500 cc bolus NS and admitted for hyponatremia and JEMIMA. Pt coming to stepdown 4T to 4T cardiac telemetry to restart home GDMT meds. Pt was started on Toprol XL 25mg daily.     Assessment and Plan:  #Hypovolemic Shock   -resolved      #Hyponatremia secondary to poor po intake and diuretics  -Received fluids and off levophed  -Na improved. Normal TSH, pending cortisol   -pending TTE   -Holding Spironolactone and Entresto (due to current JEMIMA) - resume as tolerated   -downgraded to telemetry     #CAD s/p stent HFrEF 25%/ prior ICD      #Severe Pulm HTN  -Home meds held: Entresto, Spironolactone, Dapagliflozin,   -C/w home meds: Torsemide, atorvastatin   -Home dose Torsemide 20mg but 10mg was given   -Daily IVC   -Started Toprol 25mg daily     #JEMIMA -prerenal  #Acute on Chronic  -Baseline Cr 1.3  -patient's Cr trending down from 2.3--> 1.7  -Continue to Home meds held: Entresto, Spironolactone, Dapagliflozin  -follow up with nephrology     #UTI  -asymptomatic  +UA s/p Ceftriaxone 1 dose   -Ucx: E.coli  -C/w Rocephin    #Hypothyroidism  -c/w synthroid 25mcg daily     #Excoriations diffusely on arms, legs, face, and genitals No plaques or raised lesions with central clearing  #Likely eczematous rash now with excoriations remaining  - derm evaluation done     #Misc  -PT consult   - consult     GI prophylaxis: none  DVT prophylaxis: heparin  Full code.   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  Patient was given 500 cc bolus NS and admitted for hyponatremia and JEMIMA. Pt coming to stepdown 4T to 4T cardiac telemetry to restart home GDMT meds. Pt was started on Toprol XL 25mg daily.     Assessment and Plan:  #Hypovolemic Shock   -resolved      #Hyponatremia secondary to poor po intake and diuretics  -Received fluids and off levophed  -Na improved. Normal TSH, pending cortisol   -pending TTE   -Holding Spironolactone and Entresto (due to current JEMIMA) - resume as tolerated   -downgraded to telemetry     #CAD s/p stent HFrEF 25%/ prior ICD      #Severe Pulm HTN  -Home meds held: Entresto, Spironolactone, Dapagliflozin,   -C/w home meds: Torsemide, atorvastatin   -Home dose Torsemide 20mg but 10mg was given   -Daily IVC   -Started Toprol 25mg daily     #JEMIMA -prerenal  #Acute on Chronic  -Baseline Cr 1.3  -patient's Cr trending down from 2.3--> 1.4   -Continue to Home meds held: Entresto, Spironolactone, Dapagliflozin  -follow up with nephrology     #UTI  -asymptomatic  +UA s/p Ceftriaxone 1 dose   -Ucx: E.coli  -C/w Rocephin    #Hypothyroidism  -c/w synthroid 25mcg daily     #Excoriations diffusely on arms, legs, face, and genitals No plaques or raised lesions with central clearing  #Likely eczematous rash now with excoriations remaining  - derm evaluation done     #Misc  -PT consult   - consult     GI prophylaxis: none  DVT prophylaxis: heparin  Full code.   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  Patient was given 500 cc bolus NS and admitted for hyponatremia and JEMIMA. Pt coming to stepdown 4T to 4T cardiac telemetry to restart home GDMT meds. Pt was started on Toprol XL 25mg daily.     Assessment and Plan:  #Hypovolemic Shock   -resolved      #Hyponatremia secondary to poor po intake and diuretics  -Received fluids and off levophed  -Na improved. Normal TSH, pending cortisol   -pending TTE   -Holding Spironolactone and Entresto (due to current JEMIMA) - resume as tolerated   -downgraded to telemetry     #CAD s/p stent HFrEF 25%/ prior ICD      #Severe Pulm HTN  -Home meds held: Entresto, Spironolactone, Dapagliflozin, Torsemide  -C/w home meds: atorvastatin   -d/c torsemide    -Daily IVC   -Started Toprol 25mg daily     #JEMIMA -prerenal  #Acute on Chronic  -Baseline Cr 1.3  -patient's Cr trending down from 2.3--> 1.4   -Continue to Home meds held: Entresto, Spironolactone, Dapagliflozin  -follow up with nephrology     #UTI  -asymptomatic  +UA s/p Ceftriaxone 1 dose   -Ucx: E.coli  -C/w Rocephin    #Hypothyroidism  -c/w synthroid 25mcg daily     #Excoriations diffusely on arms, legs, face, and genitals No plaques or raised lesions with central clearing  #Likely eczematous rash now with excoriations remaining  - derm evaluation done     #Misc  -PT consult   - consult     GI prophylaxis: none  DVT prophylaxis: heparin  Full code.   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  Patient was given 500 cc bolus NS and admitted for hyponatremia and JEMIMA. Pt coming to stepdown 4T to 4T cardiac telemetry to restart home GDMT meds. Pt was started on Toprol XL 25mg daily.     Assessment and Plan:  #Hypovolemic Shock   -resolved      #Hyponatremia secondary to poor po intake and diuretics  -Received fluids and off levophed  -Na improved. Normal TSH, pending cortisol   -Holding Spironolactone and Entresto (due to current JEMIMA) - resume as tolerated   -downgraded to telemetry     #CAD s/p stent HFrEF 25%/ prior ICD      #Severe Pulm HTN  -Home meds held: Entresto, Spironolactone, Dapagliflozin, Torsemide  -C/w home meds: atorvastatin   -d/c torsemide    -Daily IVC   -Started Toprol 25mg daily     #JEMIMA -prerenal  #Acute on Chronic  -Baseline Cr 1.3  -patient's Cr trending down from 2.3--> 1.4   -Continue to Home meds held: Entresto, Spironolactone, Dapagliflozin  -follow up with nephrology     #UTI  -asymptomatic  +UA s/p Ceftriaxone 1 dose   -Ucx: E.coli  -C/w Rocephin    #Hypothyroidism  -c/w synthroid 25mcg daily     #Excoriations diffusely on arms, legs, face, and genitals No plaques or raised lesions with central clearing  #Likely eczematous rash now with excoriations remaining  - derm evaluation done     #Misc  -PT consult   - consult     GI prophylaxis: none  DVT prophylaxis: heparin  Full code.

## 2024-07-05 LAB
ALBUMIN SERPL ELPH-MCNC: 3.6 G/DL — SIGNIFICANT CHANGE UP (ref 3.5–5.2)
ALP SERPL-CCNC: 149 U/L — HIGH (ref 30–115)
ALT FLD-CCNC: 15 U/L — SIGNIFICANT CHANGE UP (ref 0–41)
ANION GAP SERPL CALC-SCNC: 13 MMOL/L — SIGNIFICANT CHANGE UP (ref 7–14)
AST SERPL-CCNC: 23 U/L — SIGNIFICANT CHANGE UP (ref 0–41)
BASOPHILS # BLD AUTO: 0.05 K/UL — SIGNIFICANT CHANGE UP (ref 0–0.2)
BASOPHILS NFR BLD AUTO: 0.9 % — SIGNIFICANT CHANGE UP (ref 0–1)
BILIRUB SERPL-MCNC: 0.3 MG/DL — SIGNIFICANT CHANGE UP (ref 0.2–1.2)
BUN SERPL-MCNC: 15 MG/DL — SIGNIFICANT CHANGE UP (ref 10–20)
CALCIUM SERPL-MCNC: 8.9 MG/DL — SIGNIFICANT CHANGE UP (ref 8.4–10.5)
CHLORIDE SERPL-SCNC: 97 MMOL/L — LOW (ref 98–110)
CO2 SERPL-SCNC: 25 MMOL/L — SIGNIFICANT CHANGE UP (ref 17–32)
CREAT SERPL-MCNC: 1.1 MG/DL — SIGNIFICANT CHANGE UP (ref 0.7–1.5)
CULTURE RESULTS: SIGNIFICANT CHANGE UP
CULTURE RESULTS: SIGNIFICANT CHANGE UP
EGFR: 59 ML/MIN/1.73M2 — LOW
EOSINOPHIL # BLD AUTO: 0.1 K/UL — SIGNIFICANT CHANGE UP (ref 0–0.7)
EOSINOPHIL NFR BLD AUTO: 1.8 % — SIGNIFICANT CHANGE UP (ref 0–8)
GLUCOSE BLDC GLUCOMTR-MCNC: 116 MG/DL — HIGH (ref 70–99)
GLUCOSE BLDC GLUCOMTR-MCNC: 141 MG/DL — HIGH (ref 70–99)
GLUCOSE SERPL-MCNC: 117 MG/DL — HIGH (ref 70–99)
HCT VFR BLD CALC: 25.3 % — LOW (ref 37–47)
HGB BLD-MCNC: 8.2 G/DL — LOW (ref 12–16)
LYMPHOCYTES # BLD AUTO: 1.25 K/UL — SIGNIFICANT CHANGE UP (ref 1.2–3.4)
LYMPHOCYTES # BLD AUTO: 22.1 % — SIGNIFICANT CHANGE UP (ref 20.5–51.1)
MAGNESIUM SERPL-MCNC: 1.5 MG/DL — LOW (ref 1.8–2.4)
MCHC RBC-ENTMCNC: 27.5 PG — SIGNIFICANT CHANGE UP (ref 27–31)
MCHC RBC-ENTMCNC: 32.4 G/DL — SIGNIFICANT CHANGE UP (ref 32–37)
MCV RBC AUTO: 84.9 FL — SIGNIFICANT CHANGE UP (ref 81–99)
MONOCYTES # BLD AUTO: 0.5 K/UL — SIGNIFICANT CHANGE UP (ref 0.1–0.6)
MONOCYTES NFR BLD AUTO: 8.8 % — SIGNIFICANT CHANGE UP (ref 1.7–9.3)
NEUTROPHILS # BLD AUTO: 3.74 K/UL — SIGNIFICANT CHANGE UP (ref 1.4–6.5)
NEUTROPHILS NFR BLD AUTO: 66.4 % — SIGNIFICANT CHANGE UP (ref 42.2–75.2)
NRBC # BLD: SIGNIFICANT CHANGE UP /100 WBCS (ref 0–0)
PLATELET # BLD AUTO: 133 K/UL — SIGNIFICANT CHANGE UP (ref 130–400)
PMV BLD: 10.5 FL — HIGH (ref 7.4–10.4)
POTASSIUM SERPL-MCNC: 4.5 MMOL/L — SIGNIFICANT CHANGE UP (ref 3.5–5)
POTASSIUM SERPL-SCNC: 4.5 MMOL/L — SIGNIFICANT CHANGE UP (ref 3.5–5)
PROT SERPL-MCNC: 6.6 G/DL — SIGNIFICANT CHANGE UP (ref 6–8)
RBC # BLD: 2.98 M/UL — LOW (ref 4.2–5.4)
RBC # FLD: 14.6 % — HIGH (ref 11.5–14.5)
SODIUM SERPL-SCNC: 135 MMOL/L — SIGNIFICANT CHANGE UP (ref 135–146)
SPECIMEN SOURCE: SIGNIFICANT CHANGE UP
SPECIMEN SOURCE: SIGNIFICANT CHANGE UP
WBC # BLD: 5.64 K/UL — SIGNIFICANT CHANGE UP (ref 4.8–10.8)
WBC # FLD AUTO: 5.64 K/UL — SIGNIFICANT CHANGE UP (ref 4.8–10.8)

## 2024-07-05 PROCEDURE — 99232 SBSQ HOSP IP/OBS MODERATE 35: CPT

## 2024-07-05 RX ORDER — SACUBITRIL AND VALSARTAN 97; 103 MG/1; MG/1
0.5 TABLET, FILM COATED ORAL
Refills: 0 | Status: DISCONTINUED | OUTPATIENT
Start: 2024-07-05 | End: 2024-07-10

## 2024-07-05 RX ORDER — DAPAGLIFLOZIN 10 MG/1
10 TABLET, FILM COATED ORAL DAILY
Refills: 0 | Status: DISCONTINUED | OUTPATIENT
Start: 2024-07-05 | End: 2024-07-11

## 2024-07-05 RX ORDER — MAGNESIUM SULFATE 100 %
2 POWDER (GRAM) MISCELLANEOUS
Refills: 0 | Status: COMPLETED | OUTPATIENT
Start: 2024-07-05 | End: 2024-07-05

## 2024-07-05 RX ORDER — ACETAMINOPHEN 325 MG
650 TABLET ORAL EVERY 6 HOURS
Refills: 0 | Status: DISCONTINUED | OUTPATIENT
Start: 2024-07-05 | End: 2024-07-11

## 2024-07-05 RX ORDER — SACUBITRIL AND VALSARTAN 97; 103 MG/1; MG/1
0.5 TABLET, FILM COATED ORAL ONCE
Refills: 0 | Status: COMPLETED | OUTPATIENT
Start: 2024-07-05 | End: 2024-07-05

## 2024-07-05 RX ADMIN — SACUBITRIL AND VALSARTAN 0.5 TABLET(S): 97; 103 TABLET, FILM COATED ORAL at 17:42

## 2024-07-05 RX ADMIN — DAPAGLIFLOZIN 10 MILLIGRAM(S): 10 TABLET, FILM COATED ORAL at 17:41

## 2024-07-05 RX ADMIN — Medication 1 APPLICATION(S): at 21:15

## 2024-07-05 RX ADMIN — HEPARIN SODIUM 5000 UNIT(S): 50 INJECTION, SOLUTION INTRAVENOUS at 21:15

## 2024-07-05 RX ADMIN — Medication 25 GRAM(S): at 09:33

## 2024-07-05 RX ADMIN — Medication 25 GRAM(S): at 08:02

## 2024-07-05 RX ADMIN — SACUBITRIL AND VALSARTAN 0.5 TABLET(S): 97; 103 TABLET, FILM COATED ORAL at 11:30

## 2024-07-05 RX ADMIN — HEPARIN SODIUM 5000 UNIT(S): 50 INJECTION, SOLUTION INTRAVENOUS at 05:34

## 2024-07-05 RX ADMIN — Medication 25 MILLIGRAM(S): at 05:34

## 2024-07-05 RX ADMIN — Medication 1 APPLICATION(S): at 05:36

## 2024-07-05 RX ADMIN — HEPARIN SODIUM 5000 UNIT(S): 50 INJECTION, SOLUTION INTRAVENOUS at 13:11

## 2024-07-05 RX ADMIN — CLOPIDOGREL BISULFATE 75 MILLIGRAM(S): 75 TABLET, FILM COATED ORAL at 11:29

## 2024-07-05 RX ADMIN — Medication 1 APPLICATION(S): at 13:12

## 2024-07-05 RX ADMIN — Medication 25 MICROGRAM(S): at 05:33

## 2024-07-05 RX ADMIN — Medication 1 APPLICATION(S): at 05:34

## 2024-07-05 RX ADMIN — ATORVASTATIN CALCIUM 80 MILLIGRAM(S): 20 TABLET, FILM COATED ORAL at 21:15

## 2024-07-05 NOTE — PROGRESS NOTE ADULT - ASSESSMENT
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  Patient was given 500 cc bolus NS and admitted for hyponatremia and JEMIMA. Pt coming to stepdown 4T to 4T cardiac telemetry to restart home GDMT meds. Pt was started on Toprol XL 25mg daily.     Assessment and Plan:  #Hypovolemic Shock   -resolved      #Hyponatremia secondary to poor po intake and diuretics  -Received fluids and off levophed  -Na improved. Normal TSH, pending cortisol   -Holding Spironolactone and Entresto (due to current JEMIMA) - resume as tolerated   -downgraded to telemetry     #CAD s/p stent HFrEF 25%/ prior ICD      #Severe Pulm HTN  -Home meds held: Entresto, Spironolactone, Dapagliflozin, Torsemide  -C/w home meds: atorvastatin   - Continue to hold: Spironolactone and Torsemide    - Check IVC today   - Continue Toprol 25mg daily   - Restarted 1/2 dose Entresto 24/26  - May restart Farxiga this evening     #JEMIMA -prerenal  #Acute on Chronic  -Baseline Cr 1.3  -patient's Cr trending down from 2.3--> 1.4   -Continue to Home meds held: Entresto, Spironolactone, Dapagliflozin  -follow up with nephrology     #UTI  -asymptomatic  +UA s/p Ceftriaxone 1 dose   -Ucx: E.coli  -C/w Rocephin    #Hypothyroidism  -c/w synthroid 25mcg daily     #Excoriations diffusely on arms, legs, face, and genitals No plaques or raised lesions with central clearing  #Likely eczematous rash now with excoriations remaining  - derm evaluation done     #Misc  -PT consult   - consult     GI prophylaxis: none  DVT prophylaxis: heparin  Full code.   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  Patient was given 500 cc bolus NS and admitted for hyponatremia and JEMIMA. Pt coming to stepdown 4T to 4T cardiac telemetry to restart home GDMT meds. Pt was started on Toprol XL 25mg daily.     Assessment and Plan:    # Severe Pulm HTN  - Home meds: Entresto, Spironolactone, Dapagliflozin, Torsemide  - IVC 7/5 1.8 cm and collapsing  - Continue to hold: Spironolactone and Torsemide    - Continue Toprol 25mg daily   - Restarted 1/2 dose Entresto 24/26  - May restart Farxiga this evening   - Continue Atorvastatin 80 mg daily     # JEMIMA - resolved  - Baseline Cr 1.3  - Cr trending 2.3--> 1.1  - Reintroducing GDMT    # Hypovolemic Shock   - resolved      # Hyponatremia secondary to poor po intake and diuretics  - resolved     # CAD s/p stent HFrEF 25%/ prior ICD  - Continue Plavix 75 mg daily  - Continue Toprolol XL 25 mg daily   - Continue Atorvastatin 80 mg daily     # UTI  - s/p IV abx     # Hypothyroidism  - c/w synthroid 25mcg daily     # Diffuse Skin Rash  - Continue wound care as per Derm    #Misc  -PT consult   - consult     GI prophylaxis: none  DVT prophylaxis: heparin  Full code.

## 2024-07-05 NOTE — PROGRESS NOTE ADULT - SUBJECTIVE AND OBJECTIVE BOX
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 Patient vitals in ED: BP 92/65 HR 74 Temp 98.4 Labs significant for Na 117, K 3.3 Cr 2.3 Lactate 1.2 . Patient was given 500 cc bolus NS and admitted for hyponatremia and JEMIMA.  Pt was admitted to medicine stepdown 24 for hyponatremia of 117 and JEMIMA. Pt came to  on  for further management of GDMT medications.     Patient was hypotensive overnight  with BP 93/50 given 500 cc bolus and midodrine 10 mg without improvement. Patient put on Levophed and upgraded to cardiac stepdown. Patient BP at 7am 96/60 without symptoms of SOB, chest pain. off Levophed overnight.     Interval history: Pt was seen at bedside today in NAD. Pt denies SOB, palpitations, chest pain, syncope, and dizziness     Review of systems: A complete 10-point review of systems was obtained and is negative except as stated in the interval history.    Vitals:  Vital Signs Last 24 Hrs  T(C): 36.7 (2024 08:49), Max: 36.9 (2024 16:03)  T(F): 98.1 (2024 08:49), Max: 98.5 (2024 16:03)  HR: 77 (2024 11:28) (75 - 84)  BP: 121/59 (2024 11:28) (87/55 - 124/59)  BP(mean): 82 (2024 11:28) (66 - 84)  RR: 14 (2024 11:28) (14 - 20)  SpO2: 100% (2024 11:28) (98% - 100%)    Parameters below as of 2024 11:28  Patient On (Oxygen Delivery Method): nasal cannula  O2 Flow (L/min): 4        Physical exam:  General: No apparent distress  HEENT: Anicteric sclera. Moist mucous membranes.   Cardiac: Regular rate and rhythm. No murmurs, rubs, or gallops.   Vascular: Symmetric radial pulses. Dorsalis pedis pulses palpable.   Respiratory: Normal effort. Clear to ascultation.   Abdomen: Soft, nontender. Audible bowel sounds.   Extremities: Warm with no edema. No cyanosis or clubbing.   Skin: Warm and dry. No rash.   Neurologic: Grossly normal motor function.   Psychiatric: Oriented to person, place, and time.     Data reviewed:  - Telemetry:   NSR w/ 1st degree heart block 73-99 bpm   - EC24: NSR w/ 1st degree AV block   - TTE:   23: LV EF 25-30%. Moderate (grade II) diastolic dysfunction. Severely enlarged right ventricle (RVEDD = 5.3cm) with low-normal function. Moderately enlarged right atrium. Mildly enlarged left atrium. Moderate-severe tricuspid regurgitation with hepatic vein flow reversal. Mild aortic valve stenosis (Vmax 2.4m/s, mean PG 12mmHg, SHILPI 1.79cm2). Mild aortic regurgitation. The mitral valve leaflets are tethered with trace regurgitation. Severe pulmonary hypertension (PASP is at least 60mmHg). The IVC is dilated (2.8cm) with <50% collapsibility indicating increased right atrial pressure. PASP may be underestimated due to TR severity. Small pericardial effusion without echocardiographic signs of tamponade physiology.  - Chest x-ray:   24: Cardiomegaly, unchanged. No acute infiltrates.  - Stress test:   21:   1. MODERATE REVERSIBLE DEFECT IN THE SEPTUM, ANTERIOR AND INFERIOR WALL OF THE LEFT VENTRICLE CONSISTENT WITH ISCHEMIA.  2. DILATED LEFT VENTRICLE WITH MARKED GLOBAL HYPOKINESIS. THERE IS INCOMPLETE THICKENING OF THE SEPTUM ANTERIOR AND INFERIOR WALL OF THE LEFT VENTRICLE RAISING QUESTION OF HIBERNATING MYOCARDIUM. THE LATERAL WALL OF THE LEFT VENTRICLE THICKENS NORMALLY.  3. LEFT VENTRICULAR EJECTION FRACTION OF <20 % WHICH IS VERY LOW. ECHOCARDIOGRAPHIC ESTIMATED EJECTION FRACTION 2021 WAS 35 TO 40%.  - CCTA:  - Cardiac catheterization:    RCA disease - RICARDO x 1  - Cardiac MRI:      - Labs:                        8.2    5.64  )-----------( 133      ( 2024 04:41 )             25.3     07-05    135  |  97<L>  |  15  ----------------------------<  117<H>  4.5   |  25  |  1.1    Ca    8.9      2024 04:41  Mg     1.5         TPro  6.6  /  Alb  3.6  /  TBili  0.3  /  DBili  x   /  AST  23  /  ALT  15  /  AlkPhos  149<H>      LIVER FUNCTIONS - ( 2024 04:41 )  Alb: 3.6 g/dL / Pro: 6.6 g/dL / ALK PHOS: 149 U/L / ALT: 15 U/L / AST: 23 U/L / GGT: x               Lactate Trend    Urinalysis Basic - ( 2024 04:41 )    Color: x / Appearance: x / SG: x / pH: x  Gluc: 117 mg/dL / Ketone: x  / Bili: x / Urobili: x   Blood: x / Protein: x / Nitrite: x   Leuk Esterase: x / RBC: x / WBC x   Sq Epi: x / Non Sq Epi: x / Bacteria: x        Medications:  MEDICATIONS  (STANDING):  atorvastatin 80 milliGRAM(s) Oral at bedtime  calamine/zinc oxide Lotion 1 Application(s) Topical three times a day  chlorhexidine 2% Cloths 1 Application(s) Topical <User Schedule>  clopidogrel Tablet 75 milliGRAM(s) Oral daily  dextrose 10% Bolus 125 milliLiter(s) IV Bolus once  dextrose 5%. 1000 milliLiter(s) (50 mL/Hr) IV Continuous <Continuous>  dextrose 5%. 1000 milliLiter(s) (100 mL/Hr) IV Continuous <Continuous>  dextrose 50% Injectable 12.5 Gram(s) IV Push once  dextrose 50% Injectable 25 Gram(s) IV Push once  glucagon  Injectable 1 milliGRAM(s) IntraMuscular once  heparin   Injectable 5000 Unit(s) SubCutaneous every 8 hours  insulin lispro (ADMELOG) corrective regimen sliding scale   SubCutaneous three times a day before meals  insulin lispro (ADMELOG) corrective regimen sliding scale   SubCutaneous at bedtime  levothyroxine 25 MICROGram(s) Oral daily  metoprolol succinate ER 25 milliGRAM(s) Oral daily    MEDICATIONS  (PRN):  acetaminophen     Tablet .. 650 milliGRAM(s) Oral every 6 hours PRN Temp greater or equal to 38C (100.4F), Mild Pain (1 - 3)  dextrose Oral Gel 15 Gram(s) Oral once PRN Blood Glucose LESS THAN 70 milliGRAM(s)/deciliter  melatonin 3 milliGRAM(s) Oral at bedtime PRN Insomnia      PRN:     Allergies    No Known Allergies    Intolerances      Please contact me with any questions or concerns at [phone].

## 2024-07-06 LAB
ALBUMIN SERPL ELPH-MCNC: 3.9 G/DL — SIGNIFICANT CHANGE UP (ref 3.5–5.2)
ALP SERPL-CCNC: 173 U/L — HIGH (ref 30–115)
ALT FLD-CCNC: 16 U/L — SIGNIFICANT CHANGE UP (ref 0–41)
ANION GAP SERPL CALC-SCNC: 12 MMOL/L — SIGNIFICANT CHANGE UP (ref 7–14)
ANION GAP SERPL CALC-SCNC: 15 MMOL/L — HIGH (ref 7–14)
AST SERPL-CCNC: 25 U/L — SIGNIFICANT CHANGE UP (ref 0–41)
BASOPHILS # BLD AUTO: 0.13 K/UL — SIGNIFICANT CHANGE UP (ref 0–0.2)
BASOPHILS NFR BLD AUTO: 2.3 % — HIGH (ref 0–1)
BILIRUB SERPL-MCNC: 0.3 MG/DL — SIGNIFICANT CHANGE UP (ref 0.2–1.2)
BUN SERPL-MCNC: 18 MG/DL — SIGNIFICANT CHANGE UP (ref 10–20)
BUN SERPL-MCNC: 18 MG/DL — SIGNIFICANT CHANGE UP (ref 10–20)
CALCIUM SERPL-MCNC: 9.2 MG/DL — SIGNIFICANT CHANGE UP (ref 8.4–10.5)
CALCIUM SERPL-MCNC: 9.6 MG/DL — SIGNIFICANT CHANGE UP (ref 8.4–10.4)
CHLORIDE SERPL-SCNC: 100 MMOL/L — SIGNIFICANT CHANGE UP (ref 98–110)
CHLORIDE SERPL-SCNC: 96 MMOL/L — LOW (ref 98–110)
CO2 SERPL-SCNC: 21 MMOL/L — SIGNIFICANT CHANGE UP (ref 17–32)
CO2 SERPL-SCNC: 26 MMOL/L — SIGNIFICANT CHANGE UP (ref 17–32)
CREAT SERPL-MCNC: 1.2 MG/DL — SIGNIFICANT CHANGE UP (ref 0.7–1.5)
CREAT SERPL-MCNC: 1.3 MG/DL — SIGNIFICANT CHANGE UP (ref 0.7–1.5)
EGFR: 48 ML/MIN/1.73M2 — LOW
EGFR: 53 ML/MIN/1.73M2 — LOW
EOSINOPHIL # BLD AUTO: 0.19 K/UL — SIGNIFICANT CHANGE UP (ref 0–0.7)
EOSINOPHIL NFR BLD AUTO: 3.4 % — SIGNIFICANT CHANGE UP (ref 0–8)
GLUCOSE SERPL-MCNC: 117 MG/DL — HIGH (ref 70–99)
GLUCOSE SERPL-MCNC: 148 MG/DL — HIGH (ref 70–99)
HCT VFR BLD CALC: 27.6 % — LOW (ref 37–47)
HGB BLD-MCNC: 8.8 G/DL — LOW (ref 12–16)
IMM GRANULOCYTES NFR BLD AUTO: 5 % — HIGH (ref 0.1–0.3)
LYMPHOCYTES # BLD AUTO: 1.72 K/UL — SIGNIFICANT CHANGE UP (ref 1.2–3.4)
LYMPHOCYTES # BLD AUTO: 30.7 % — SIGNIFICANT CHANGE UP (ref 20.5–51.1)
MAGNESIUM SERPL-MCNC: 2.4 MG/DL — SIGNIFICANT CHANGE UP (ref 1.8–2.4)
MCHC RBC-ENTMCNC: 27.4 PG — SIGNIFICANT CHANGE UP (ref 27–31)
MCHC RBC-ENTMCNC: 31.9 G/DL — LOW (ref 32–37)
MCV RBC AUTO: 86 FL — SIGNIFICANT CHANGE UP (ref 81–99)
MONOCYTES # BLD AUTO: 0.42 K/UL — SIGNIFICANT CHANGE UP (ref 0.1–0.6)
MONOCYTES NFR BLD AUTO: 7.5 % — SIGNIFICANT CHANGE UP (ref 1.7–9.3)
NEUTROPHILS # BLD AUTO: 2.87 K/UL — SIGNIFICANT CHANGE UP (ref 1.4–6.5)
NEUTROPHILS NFR BLD AUTO: 51.1 % — SIGNIFICANT CHANGE UP (ref 42.2–75.2)
NRBC # BLD: 0 /100 WBCS — SIGNIFICANT CHANGE UP (ref 0–0)
PLATELET # BLD AUTO: 160 K/UL — SIGNIFICANT CHANGE UP (ref 130–400)
PMV BLD: 11 FL — HIGH (ref 7.4–10.4)
POTASSIUM SERPL-MCNC: 5.3 MMOL/L — HIGH (ref 3.5–5)
POTASSIUM SERPL-MCNC: 5.8 MMOL/L — HIGH (ref 3.5–5)
POTASSIUM SERPL-SCNC: 5.3 MMOL/L — HIGH (ref 3.5–5)
POTASSIUM SERPL-SCNC: 5.8 MMOL/L — HIGH (ref 3.5–5)
PROT SERPL-MCNC: 6.8 G/DL — SIGNIFICANT CHANGE UP (ref 6–8)
RBC # BLD: 3.21 M/UL — LOW (ref 4.2–5.4)
RBC # FLD: 14.6 % — HIGH (ref 11.5–14.5)
SODIUM SERPL-SCNC: 132 MMOL/L — LOW (ref 135–146)
SODIUM SERPL-SCNC: 138 MMOL/L — SIGNIFICANT CHANGE UP (ref 135–146)
WBC # BLD: 5.61 K/UL — SIGNIFICANT CHANGE UP (ref 4.8–10.8)
WBC # FLD AUTO: 5.61 K/UL — SIGNIFICANT CHANGE UP (ref 4.8–10.8)

## 2024-07-06 PROCEDURE — 99232 SBSQ HOSP IP/OBS MODERATE 35: CPT

## 2024-07-06 RX ADMIN — CLOPIDOGREL BISULFATE 75 MILLIGRAM(S): 75 TABLET, FILM COATED ORAL at 13:24

## 2024-07-06 RX ADMIN — SACUBITRIL AND VALSARTAN 0.5 TABLET(S): 97; 103 TABLET, FILM COATED ORAL at 17:14

## 2024-07-06 RX ADMIN — Medication 1 APPLICATION(S): at 05:49

## 2024-07-06 RX ADMIN — ATORVASTATIN CALCIUM 80 MILLIGRAM(S): 20 TABLET, FILM COATED ORAL at 21:05

## 2024-07-06 RX ADMIN — Medication 25 MICROGRAM(S): at 05:42

## 2024-07-06 RX ADMIN — Medication 1 APPLICATION(S): at 05:43

## 2024-07-06 RX ADMIN — DAPAGLIFLOZIN 10 MILLIGRAM(S): 10 TABLET, FILM COATED ORAL at 13:24

## 2024-07-06 RX ADMIN — SACUBITRIL AND VALSARTAN 0.5 TABLET(S): 97; 103 TABLET, FILM COATED ORAL at 05:42

## 2024-07-06 RX ADMIN — Medication 25 MILLIGRAM(S): at 05:41

## 2024-07-06 RX ADMIN — HEPARIN SODIUM 5000 UNIT(S): 50 INJECTION, SOLUTION INTRAVENOUS at 21:05

## 2024-07-06 RX ADMIN — Medication 1 APPLICATION(S): at 13:25

## 2024-07-06 RX ADMIN — Medication 1 APPLICATION(S): at 21:05

## 2024-07-06 RX ADMIN — HEPARIN SODIUM 5000 UNIT(S): 50 INJECTION, SOLUTION INTRAVENOUS at 13:24

## 2024-07-06 RX ADMIN — HEPARIN SODIUM 5000 UNIT(S): 50 INJECTION, SOLUTION INTRAVENOUS at 05:41

## 2024-07-06 NOTE — PROGRESS NOTE ADULT - ASSESSMENT
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  Patient was given 500 cc bolus NS and admitted for hyponatremia and JEMIMA. Pt coming to stepdown 4T to 4T cardiac telemetry to restart home GDMT meds. Pt was started on Toprol XL 25mg daily.     Assessment and Plan:    # Severe Pulm HTN  - Home meds: Entresto, Spironolactone, Dapagliflozin, Torsemide  - IVC 7/5 1.8 cm and collapsing  - Continue to hold: Spironolactone and Torsemide    - Continue Toprol 25mg daily   - Restarted 1/2 dose Entresto 24/26  - May restart Farxiga this evening   - Continue Atorvastatin 80 mg daily     # JEMIMA - resolved  - Baseline Cr 1.3  - Cr trending 2.3--> 1.1  - Reintroducing GDMT    # Hypovolemic Shock   - resolved      # Hyponatremia secondary to poor po intake and diuretics  - resolved     # CAD s/p stent HFrEF 25%/ prior ICD  - Continue Plavix 75 mg daily  - Continue Toprolol XL 25 mg daily   - Continue Atorvastatin 80 mg daily     # UTI  - s/p IV abx     # Hypothyroidism  - c/w synthroid 25mcg daily     # Diffuse Skin Rash  - Continue wound care as per Derm    #Misc  -PT consult   - consult     GI prophylaxis: none  DVT prophylaxis: heparin  Full code.   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  Patient was given 500 cc bolus NS and admitted for hyponatremia and JEMIMA. Pt coming to stepdown 4T to 4T cardiac telemetry to restart home GDMT meds. Pt was started on Toprol XL 25mg daily.     Assessment and Plan:    # Severe Pulm HTN  - Home meds: Entresto, Spironolactone, Dapagliflozin, Torsemide  - IVC 7/5 1.8 cm and collapsing  - Continue to hold: Spironolactone and Torsemide    - Continue Toprol 25mg daily   - Restarted 1/2 dose Entresto 24/26  - restarted Farxiga 10mg 7/5   - may consider starting Spironolactone this AM   - Continue Atorvastatin 80 mg daily     # JEMIMA - resolved  - Baseline Cr 1.3  - Cr trending 2.3--> 1.1  - Reintroducing GDMT    # Hypovolemic Shock   - resolved      # Hyponatremia secondary to poor po intake and diuretics  - resolved     # CAD s/p stent HFrEF 25%/ prior ICD  - Continue Plavix 75 mg daily  - Continue Toprol XL 25 mg daily   - Continue Atorvastatin 80 mg daily     # UTI  - s/p IV abx     # Hypothyroidism  - c/w synthroid 25mcg daily     # Diffuse Skin Rash  - Continue wound care as per Derm    #Misc  -PT consult   - consult     GI prophylaxis: None  DVT prophylaxis: Heparin  Full code.    Please contact me with any questions or concerns at [phone]. Assessment: 59 yoF with PMHx of HFrEF 25%, severe pulmonary HTN, DM, COPD on 4L NS, CORNELIUS, CKS baseline Cr 1.3 CVA with L sided lower extremity weakness who utilizes a wheelchair at baseline presented with decreased po intake and weakness.  Patient was given 500 cc bolus NS and admitted for hyponatremia and JEMIMA. Pt coming from stepdown 4T to 4T cardiac telemetry to restart home GDMT meds.     Plan:  # Severe Pulm HTN  - Home meds: Entresto, Spironolactone, Dapagliflozin, Torsemide  - IVC 7/5 1.8 cm and collapsing  - Continue to hold: Spironolactone and Torsemide    - Continue Toprol 25mg daily   - 7/5 restarted 1/2 dose Entresto 24/26mg  - 7/5 restarted Farxiga 10mg   - may consider starting Spironolactone this AM   - Continue Atorvastatin 80 mg daily     # JEMIMA - resolved  - Baseline Cr 1.3  - Cr trending 2.3--> 1.1  - Reintroducing GDMT    # Hypovolemic Shock   - resolved      # Hyponatremia secondary to poor po intake and diuretics  - resolved     # CAD s/p stent HFrEF 25%/ prior ICD  - Continue Plavix 75 mg daily  - Continue Toprol XL 25 mg daily   - Continue Atorvastatin 80 mg daily     # UTI  - s/p IV abx     # Hypothyroidism  - c/w synthroid 25mcg daily     # Diffuse Skin Rash  - Continue wound care as per Derm    #Misc  -PT consult   - consult     GI prophylaxis: None  DVT prophylaxis: Heparin  Full code.    Please contact me with any questions or concerns at [phone]. Assessment: 59 yoF with PMHx of HFrEF 25%, severe pulmonary HTN, DM, COPD on 4L NS, CORNELIUS, CKS baseline Cr 1.3 CVA with L sided lower extremity weakness who utilizes a wheelchair at baseline presented with decreased po intake and weakness.  Patient was given 500 cc bolus NS and admitted for hyponatremia and JEMIMA. Pt coming from stepdown 4T to 4T cardiac telemetry to restart home GDMT meds.     Plan:  # Severe Pulm HTN  - Home meds: Entresto, Spironolactone, Dapagliflozin, Torsemide  - Continue to hold: Spironolactone and Torsemide    - Continue Toprol 25mg daily   - 7/5 restarted 1/2 dose Entresto 24/26mg  - 7/5 restarted Farxiga 10mg   - start half dose spironolactone 25mg   - hold off torsemide as IVC 1.8 and collapsing   - Continue Atorvastatin 80 mg daily     # JEMIMA - resolved  - Baseline Cr 1.3  - Cr trending 2.3--> 1.1  - Reintroducing GDMT    # Hypovolemic Shock   - resolved      # Hyponatremia secondary to poor po intake and diuretics  - resolved     # CAD s/p stent HFrEF 25%/ prior ICD  - Continue Plavix 75 mg daily  - Continue Toprol XL 25 mg daily   - Continue Atorvastatin 80 mg daily     # UTI  - s/p IV abx     # Hypothyroidism  - c/w synthroid 25mcg daily     # Diffuse Skin Rash  - Continue wound care as per Derm    #Misc  -PT consult   - consult     GI prophylaxis: None  DVT prophylaxis: Heparin  Full code.    Please contact me with any questions or concerns at [phone].

## 2024-07-06 NOTE — PROGRESS NOTE ADULT - SUBJECTIVE AND OBJECTIVE BOX
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 Patient vitals in ED: BP 92/65 HR 74 Temp 98.4 Labs significant for Na 117, K 3.3 Cr 2.3 Lactate 1.2 . Patient was given 500 cc bolus NS and admitted for hyponatremia and JEMIMA.  Pt was admitted to medicine stepdown 24 for hyponatremia of 117 and JEMIMA. Pt came to  on  for further management of GDMT medications.     Patient was hypotensive overnight  with BP 93/50 given 500 cc bolus and midodrine 10 mg without improvement. Patient put on Levophed and upgraded to cardiac stepdown. Patient BP at 7am 96/60 without symptoms of SOB, chest pain. off Levophed overnight.     Interval history: Pt was seen at bedside today in NAD. Pt denies SOB, palpitations, chest pain, syncope, and dizziness.    Review of systems: A complete 10-point review of systems was obtained and is negative except as stated in the interval history.    Vitals:  ICU Vital Signs Last 24 Hrs  T(C): 36.9 (2024 23:54), Max: 36.9 (2024 15:47)  T(F): 98.4 (2024 23:54), Max: 98.4 (2024 15:47)  HR: 85 (2024 04:42) (77 - 87)  BP: 110/60 (2024 04:42) (101/68 - 138/65)  BP(mean): 77 (2024 04:42) (75 - 91)  RR: 13 (2024 04:42) (13 - 19)  SpO2: 100% (2024 04:42) (100% - 100%)    O2 Parameters below as of 2024 04:42  Patient On (Oxygen Delivery Method): nasal cannula    Physical exam:  General: No apparent distress  HEENT: Anicteric sclera. Moist mucous membranes.   Cardiac: Regular rate and rhythm. No murmurs, rubs, or gallops.   Vascular: Symmetric radial pulses. Dorsalis pedis pulses palpable.   Respiratory: Normal effort. Clear to ascultation.   Abdomen: Soft, nontender. Audible bowel sounds.   Extremities: Warm with no edema. No cyanosis or clubbing.   Skin: Warm and dry. No rash.   Neurologic: Grossly normal motor function.   Psychiatric: Oriented to person, place, and time.     Data reviewed:  - Telemetry:   NSR w/ 1st degree heart block 73-99 bpm   - EC24: NSR w/ 1st degree AV block   - TTE:   23: LV EF 25-30%. Moderate (grade II) diastolic dysfunction. Severely enlarged right ventricle (RVEDD = 5.3cm) with low-normal function. Moderately enlarged right atrium. Mildly enlarged left atrium. Moderate-severe tricuspid regurgitation with hepatic vein flow reversal. Mild aortic valve stenosis (Vmax 2.4m/s, mean PG 12mmHg, SHILPI 1.79cm2). Mild aortic regurgitation. The mitral valve leaflets are tethered with trace regurgitation. Severe pulmonary hypertension (PASP is at least 60mmHg). The IVC is dilated (2.8cm) with <50% collapsibility indicating increased right atrial pressure. PASP may be underestimated due to TR severity. Small pericardial effusion without echocardiographic signs of tamponade physiology.  - Chest x-ray:   24: Cardiomegaly, unchanged. No acute infiltrates.  - Stress test:   21:   1. MODERATE REVERSIBLE DEFECT IN THE SEPTUM, ANTERIOR AND INFERIOR WALL OF THE LEFT VENTRICLE CONSISTENT WITH ISCHEMIA.  2. DILATED LEFT VENTRICLE WITH MARKED GLOBAL HYPOKINESIS. THERE IS INCOMPLETE THICKENING OF THE SEPTUM ANTERIOR AND INFERIOR WALL OF THE LEFT VENTRICLE RAISING QUESTION OF HIBERNATING MYOCARDIUM. THE LATERAL WALL OF THE LEFT VENTRICLE THICKENS NORMALLY.  3. LEFT VENTRICULAR EJECTION FRACTION OF <20 % WHICH IS VERY LOW. ECHOCARDIOGRAPHIC ESTIMATED EJECTION FRACTION 2021 WAS 35 TO 40%.  - CCTA:  - Cardiac catheterization:    RCA disease - RICARDO x 1  - Cardiac MRI:      - Labs:                        8.2    5.64  )-----------( 133      ( 2024 04:41 )             25.3     07-05    135  |  97<L>  |  15  ----------------------------<  117<H>  4.5   |  25  |  1.1    Ca    8.9      2024 04:41  Mg     1.5     -    TPro  6.6  /  Alb  3.6  /  TBili  0.3  /  DBili  x   /  AST  23  /  ALT  15  /  AlkPhos  149<H>  -    LIVER FUNCTIONS - ( 2024 04:41 )  Alb: 3.6 g/dL / Pro: 6.6 g/dL / ALK PHOS: 149 U/L / ALT: 15 U/L / AST: 23 U/L / GGT: x               Lactate Trend    Urinalysis Basic - ( 2024 04:41 )    Color: x / Appearance: x / SG: x / pH: x  Gluc: 117 mg/dL / Ketone: x  / Bili: x / Urobili: x   Blood: x / Protein: x / Nitrite: x   Leuk Esterase: x / RBC: x / WBC x   Sq Epi: x / Non Sq Epi: x / Bacteria: x        Medications:  MEDICATIONS  (STANDING):  atorvastatin 80 milliGRAM(s) Oral at bedtime  calamine/zinc oxide Lotion 1 Application(s) Topical three times a day  chlorhexidine 2% Cloths 1 Application(s) Topical <User Schedule>  clopidogrel Tablet 75 milliGRAM(s) Oral daily  dextrose 10% Bolus 125 milliLiter(s) IV Bolus once  dextrose 5%. 1000 milliLiter(s) (50 mL/Hr) IV Continuous <Continuous>  dextrose 5%. 1000 milliLiter(s) (100 mL/Hr) IV Continuous <Continuous>  dextrose 50% Injectable 12.5 Gram(s) IV Push once  dextrose 50% Injectable 25 Gram(s) IV Push once  glucagon  Injectable 1 milliGRAM(s) IntraMuscular once  heparin   Injectable 5000 Unit(s) SubCutaneous every 8 hours  insulin lispro (ADMELOG) corrective regimen sliding scale   SubCutaneous three times a day before meals  insulin lispro (ADMELOG) corrective regimen sliding scale   SubCutaneous at bedtime  levothyroxine 25 MICROGram(s) Oral daily  metoprolol succinate ER 25 milliGRAM(s) Oral daily    MEDICATIONS  (PRN):  acetaminophen     Tablet .. 650 milliGRAM(s) Oral every 6 hours PRN Temp greater or equal to 38C (100.4F), Mild Pain (1 - 3)  dextrose Oral Gel 15 Gram(s) Oral once PRN Blood Glucose LESS THAN 70 milliGRAM(s)/deciliter  melatonin 3 milliGRAM(s) Oral at bedtime PRN Insomnia      PRN:     Allergies    No Known Allergies    Intolerances      Please contact me with any questions or concerns at [phone]. 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 Patient vitals in ED: BP 92/65 HR 74 Temp 98.4 Labs significant for Na 117, K 3.3 Cr 2.3 Lactate 1.2 . Patient was given 500 cc bolus NS and admitted for hyponatremia and JEMIMA.  Pt was admitted to medicine stepdown 24 for hyponatremia of 117 and JEMIMA. Pt came to  on  for further management of GDMT medications.     Patient was hypotensive overnight  with BP 93/50 given 500 cc bolus and midodrine 10 mg without improvement. Patient put on Levophed and upgraded to cardiac stepdown. Patient BP at 7am 96/60 without symptoms of SOB, chest pain. off Levophed overnight.     Interval history: Pt was seen at bedside today in NAD. Pt denies SOB, palpitations, chest pain, syncope, and dizziness. No overnight events.     Review of systems: A complete 10-point review of systems was obtained and is negative except as stated in the interval history.    Vitals:  ICU Vital Signs Last 24 Hrs  T(C): 36.7 (2024 07:10), Max: 36.9 (2024 15:47)  T(F): 98 (2024 07:10), Max: 98.4 (2024 15:47)  HR: 79 (2024 07:10) (77 - 87)  BP: 110/59 (2024 07:10) (101/68 - 138/65)  BP(mean): 78 (2024 07:10) (75 - 91)  RR: 12 (2024 07:10) (12 - 19)  SpO2: 100% (2024 07:10) (100% - 100%)    O2 Parameters below as of 2024 07:10  Patient On (Oxygen Delivery Method): nasal cannula  O2 Flow (L/min): 4    Physical exam:  General: No apparent distress  HEENT: Anicteric sclera. Moist mucous membranes.   Cardiac: Regular rate and rhythm. No murmurs, rubs, or gallops.   Vascular: Symmetric radial pulses. Dorsalis pedis pulses palpable.   Respiratory: Normal effort. Clear to ascultation.   Abdomen: Soft, nontender. Audible bowel sounds.   Extremities: Warm with no edema. No cyanosis or clubbing.   Skin: Warm and dry. No rash.   Neurologic: Grossly normal motor function.   Psychiatric: Oriented to person, place, and time.     Data reviewed:  - Telemetry:   NSR w/ 1st degree heart block 73-99 bpm   - EC24: NSR w/ 1st degree AV block   - TTE:   23: LV EF 25-30%. Moderate (grade II) diastolic dysfunction. Severely enlarged right ventricle (RVEDD = 5.3cm) with low-normal function. Moderately enlarged right atrium. Mildly enlarged left atrium. Moderate-severe tricuspid regurgitation with hepatic vein flow reversal. Mild aortic valve stenosis (Vmax 2.4m/s, mean PG 12mmHg, SHILPI 1.79cm2). Mild aortic regurgitation. The mitral valve leaflets are tethered with trace regurgitation. Severe pulmonary hypertension (PASP is at least 60mmHg). The IVC is dilated (2.8cm) with <50% collapsibility indicating increased right atrial pressure. PASP may be underestimated due to TR severity. Small pericardial effusion without echocardiographic signs of tamponade physiology.  - Chest x-ray:   24: Cardiomegaly, unchanged. No acute infiltrates.  - Stress test:   21:   1. MODERATE REVERSIBLE DEFECT IN THE SEPTUM, ANTERIOR AND INFERIOR WALL OF THE LEFT VENTRICLE CONSISTENT WITH ISCHEMIA.  2. DILATED LEFT VENTRICLE WITH MARKED GLOBAL HYPOKINESIS. THERE IS INCOMPLETE THICKENING OF THE SEPTUM ANTERIOR AND INFERIOR WALL OF THE LEFT VENTRICLE RAISING QUESTION OF HIBERNATING MYOCARDIUM. THE LATERAL WALL OF THE LEFT VENTRICLE THICKENS NORMALLY.  3. LEFT VENTRICULAR EJECTION FRACTION OF <20 % WHICH IS VERY LOW. ECHOCARDIOGRAPHIC ESTIMATED EJECTION FRACTION 2021 WAS 35 TO 40%.  - CCTA:  - Cardiac catheterization:    RCA disease - RICARDO x 1  - Cardiac MRI:      - Labs:                        8.2    5.64  )-----------( 133      ( 2024 04:41 )             25.3     07-05    135  |  97<L>  |  15  ----------------------------<  117<H>  4.5   |  25  |  1.1    Ca    8.9      2024 04:41  Mg     1.5         TPro  6.6  /  Alb  3.6  /  TBili  0.3  /  DBili  x   /  AST  23  /  ALT  15  /  AlkPhos  149<H>      LIVER FUNCTIONS - ( 2024 04:41 )  Alb: 3.6 g/dL / Pro: 6.6 g/dL / ALK PHOS: 149 U/L / ALT: 15 U/L / AST: 23 U/L / GGT: x           Urinalysis Basic - ( 2024 04:41 )    Color: x / Appearance: x / SG: x / pH: x  Gluc: 117 mg/dL / Ketone: x  / Bili: x / Urobili: x   Blood: x / Protein: x / Nitrite: x   Leuk Esterase: x / RBC: x / WBC x   Sq Epi: x / Non Sq Epi: x / Bacteria: x        Medications:  MEDICATIONS  (STANDING):  atorvastatin 80 milliGRAM(s) Oral at bedtime  calamine/zinc oxide Lotion 1 Application(s) Topical three times a day  chlorhexidine 2% Cloths 1 Application(s) Topical <User Schedule>  clopidogrel Tablet 75 milliGRAM(s) Oral daily  dapagliflozin 10 milliGRAM(s) Oral daily  heparin   Injectable 5000 Unit(s) SubCutaneous every 8 hours  levothyroxine 25 MICROGram(s) Oral daily  metoprolol succinate ER 25 milliGRAM(s) Oral daily  sacubitril 24 mG/valsartan 26 mG 0.5 Tablet(s) Oral two times a day    MEDICATIONS  (PRN):  acetaminophen     Tablet .. 650 milliGRAM(s) Oral every 6 hours PRN Mild Pain (1 - 3), Moderate Pain (4 - 6)  melatonin 3 milliGRAM(s) Oral at bedtime PRN Insomnia   Chief complaint: Patient is a 59y old  Female who presents with a chief complaint of hyponatremia (2024 16:35)    Hospital Course: Patient is a 59 yoF with PMHx of HFrEF 25%, severe pulmonary HTN, DM, COPD on 4L NS, CORNELIUS, CKS baseline Cr 1.3 CVA with L sided lower extremity weakness who utilizes a wheelchair at baseline presented with decreased po intake and weakness. Patient vitals in ED: BP 92/65 HR 74 Temp 98.4 Labs significant for Na 117, K 3.3 Cr 2.3 Lactate 1.2 . Patient was given 500 cc bolus NS and admitted for hyponatremia and JEMIMA.  Pt was admitted to medicine stepdown 24 for hyponatremia of 117 and JEMIMA. Pt came to  on  for further management of GDMT medications.     Patient was hypotensive overnight  with BP 93/50 given 500 cc bolus and midodrine 10 mg without improvement. Patient put on Levophed and upgraded to cardiac stepdown. Patient BP at 7am 96/60 without symptoms of SOB, chest pain. off Levophed overnight.     Interval history: Pt was seen at bedside today in NAD. Pt denies SOB, palpitations, chest pain, syncope, and dizziness. No overnight events.     Review of systems: A complete 10-point review of systems was obtained and is negative except as stated in the interval history.    Vitals:  ICU Vital Signs Last 24 Hrs  T(C): 36.7 (2024 07:10), Max: 36.9 (2024 15:47)  T(F): 98 (2024 07:10), Max: 98.4 (2024 15:47)  HR: 79 (2024 07:10) (77 - 87)  BP: 110/59 (2024 07:10) (101/68 - 138/65)  BP(mean): 78 (2024 07:10) (75 - 91)  RR: 12 (2024 07:10) (12 - 19)  SpO2: 100% (2024 07:10) (100% - 100%)    O2 Parameters below as of 2024 07:10  Patient On (Oxygen Delivery Method): nasal cannula  O2 Flow (L/min): 4    Physical exam:  General: No apparent distress  HEENT: Anicteric sclera. Moist mucous membranes.   Cardiac: Regular rate and rhythm. No murmurs, rubs, or gallops.   Vascular: Symmetric radial pulses. Dorsalis pedis pulses palpable.   Respiratory: Normal effort. Clear to ascultation.   Abdomen: Soft, nontender. Audible bowel sounds.   Extremities: Warm with no edema. No cyanosis or clubbing.   Skin: Warm and dry. No rash.   Neurologic: Grossly normal motor function.   Psychiatric: Oriented to person, place, and time.     Data reviewed:  - Telemetry:   NSR w/ 1st degree heart block  bpm   - EC24: NSR w/ 1st degree AV block   - TTE:   23: LV EF 25-30%. Moderate (grade II) diastolic dysfunction. Severely enlarged right ventricle (RVEDD = 5.3cm) with low-normal function. Moderately enlarged right atrium. Mildly enlarged left atrium. Moderate-severe tricuspid regurgitation with hepatic vein flow reversal. Mild aortic valve stenosis (Vmax 2.4m/s, mean PG 12mmHg, SHILPI 1.79cm2). Mild aortic regurgitation. The mitral valve leaflets are tethered with trace regurgitation. Severe pulmonary hypertension (PASP is at least 60mmHg). The IVC is dilated (2.8cm) with <50% collapsibility indicating increased right atrial pressure. PASP may be underestimated due to TR severity. Small pericardial effusion without echocardiographic signs of tamponade physiology.  - Chest x-ray:   24: Cardiomegaly, unchanged. No acute infiltrates.  - Stress test:   21:   1. MODERATE REVERSIBLE DEFECT IN THE SEPTUM, ANTERIOR AND INFERIOR WALL OF THE LEFT VENTRICLE CONSISTENT WITH ISCHEMIA.  2. DILATED LEFT VENTRICLE WITH MARKED GLOBAL HYPOKINESIS. THERE IS INCOMPLETE THICKENING OF THE SEPTUM ANTERIOR AND INFERIOR WALL OF THE LEFT VENTRICLE RAISING QUESTION OF HIBERNATING MYOCARDIUM. THE LATERAL WALL OF THE LEFT VENTRICLE THICKENS NORMALLY.  3. LEFT VENTRICULAR EJECTION FRACTION OF <20 % WHICH IS VERY LOW. ECHOCARDIOGRAPHIC ESTIMATED EJECTION FRACTION 2021 WAS 35 TO 40%.  - CCTA:  - Cardiac catheterization:    RCA disease - RICARDO x 1  - Cardiac MRI:    - Labs:                        8.8    5.61  )-----------( 160      ( 2024 07:52 )             27.6     07-05    135  |  97<L>  |  15  ----------------------------<  117<H>  4.5   |  25  |  1.1    Ca    8.9      2024 04:41  Mg     1.5         TPro  6.6  /  Alb  3.6  /  TBili  0.3  /  DBili  x   /  AST  23  /  ALT  15  /  AlkPhos  149<H>      LIVER FUNCTIONS - ( 2024 04:41 )  Alb: 3.6 g/dL / Pro: 6.6 g/dL / ALK PHOS: 149 U/L / ALT: 15 U/L / AST: 23 U/L / GGT: x           Urinalysis Basic - ( 2024 04:41 )    Color: x / Appearance: x / SG: x / pH: x  Gluc: 117 mg/dL / Ketone: x  / Bili: x / Urobili: x   Blood: x / Protein: x / Nitrite: x   Leuk Esterase: x / RBC: x / WBC x   Sq Epi: x / Non Sq Epi: x / Bacteria: x      Medications:  MEDICATIONS  (STANDING):  atorvastatin 80 milliGRAM(s) Oral at bedtime  calamine/zinc oxide Lotion 1 Application(s) Topical three times a day  chlorhexidine 2% Cloths 1 Application(s) Topical <User Schedule>  clopidogrel Tablet 75 milliGRAM(s) Oral daily  dapagliflozin 10 milliGRAM(s) Oral daily  heparin   Injectable 5000 Unit(s) SubCutaneous every 8 hours  levothyroxine 25 MICROGram(s) Oral daily  metoprolol succinate ER 25 milliGRAM(s) Oral daily  sacubitril 24 mG/valsartan 26 mG 0.5 Tablet(s) Oral two times a day    MEDICATIONS  (PRN):  acetaminophen     Tablet .. 650 milliGRAM(s) Oral every 6 hours PRN Mild Pain (1 - 3), Moderate Pain (4 - 6)  melatonin 3 milliGRAM(s) Oral at bedtime PRN Insomnia

## 2024-07-07 LAB
ALBUMIN SERPL ELPH-MCNC: 3.6 G/DL — SIGNIFICANT CHANGE UP (ref 3.5–5.2)
ALP SERPL-CCNC: 130 U/L — HIGH (ref 30–115)
ALT FLD-CCNC: 11 U/L — SIGNIFICANT CHANGE UP (ref 0–41)
ANION GAP SERPL CALC-SCNC: 9 MMOL/L — SIGNIFICANT CHANGE UP (ref 7–14)
ANISOCYTOSIS BLD QL: SIGNIFICANT CHANGE UP
AST SERPL-CCNC: 16 U/L — SIGNIFICANT CHANGE UP (ref 0–41)
BASOPHILS # BLD AUTO: 0 K/UL — SIGNIFICANT CHANGE UP (ref 0–0.2)
BASOPHILS NFR BLD AUTO: 0 % — SIGNIFICANT CHANGE UP (ref 0–1)
BILIRUB SERPL-MCNC: 0.3 MG/DL — SIGNIFICANT CHANGE UP (ref 0.2–1.2)
BUN SERPL-MCNC: 22 MG/DL — HIGH (ref 10–20)
CALCIUM SERPL-MCNC: 9.2 MG/DL — SIGNIFICANT CHANGE UP (ref 8.4–10.5)
CHLORIDE SERPL-SCNC: 99 MMOL/L — SIGNIFICANT CHANGE UP (ref 98–110)
CO2 SERPL-SCNC: 24 MMOL/L — SIGNIFICANT CHANGE UP (ref 17–32)
CREAT SERPL-MCNC: 1.6 MG/DL — HIGH (ref 0.7–1.5)
EGFR: 37 ML/MIN/1.73M2 — LOW
EOSINOPHIL # BLD AUTO: 0.22 K/UL — SIGNIFICANT CHANGE UP (ref 0–0.7)
EOSINOPHIL NFR BLD AUTO: 3.5 % — SIGNIFICANT CHANGE UP (ref 0–8)
GLUCOSE SERPL-MCNC: 118 MG/DL — HIGH (ref 70–99)
HCT VFR BLD CALC: 25.5 % — LOW (ref 37–47)
HGB BLD-MCNC: 7.9 G/DL — LOW (ref 12–16)
LYMPHOCYTES # BLD AUTO: 1.57 K/UL — SIGNIFICANT CHANGE UP (ref 1.2–3.4)
LYMPHOCYTES # BLD AUTO: 25.6 % — SIGNIFICANT CHANGE UP (ref 20.5–51.1)
MAGNESIUM SERPL-MCNC: 1.8 MG/DL — SIGNIFICANT CHANGE UP (ref 1.8–2.4)
MANUAL SMEAR VERIFICATION: SIGNIFICANT CHANGE UP
MCHC RBC-ENTMCNC: 27.1 PG — SIGNIFICANT CHANGE UP (ref 27–31)
MCHC RBC-ENTMCNC: 31 G/DL — LOW (ref 32–37)
MCV RBC AUTO: 87.3 FL — SIGNIFICANT CHANGE UP (ref 81–99)
METAMYELOCYTES # FLD: 0.9 % — HIGH (ref 0–0)
MICROCYTES BLD QL: SLIGHT — SIGNIFICANT CHANGE UP
MONOCYTES # BLD AUTO: 0.49 K/UL — SIGNIFICANT CHANGE UP (ref 0.1–0.6)
MONOCYTES NFR BLD AUTO: 8 % — SIGNIFICANT CHANGE UP (ref 1.7–9.3)
MYELOCYTES NFR BLD: 1.8 % — HIGH (ref 0–0)
NEUTROPHILS # BLD AUTO: 3.7 K/UL — SIGNIFICANT CHANGE UP (ref 1.4–6.5)
NEUTROPHILS NFR BLD AUTO: 60.2 % — SIGNIFICANT CHANGE UP (ref 42.2–75.2)
PLAT MORPH BLD: NORMAL — SIGNIFICANT CHANGE UP
PLATELET # BLD AUTO: 150 K/UL — SIGNIFICANT CHANGE UP (ref 130–400)
PMV BLD: 10.7 FL — HIGH (ref 7.4–10.4)
POLYCHROMASIA BLD QL SMEAR: SLIGHT — SIGNIFICANT CHANGE UP
POTASSIUM SERPL-MCNC: 5.2 MMOL/L — HIGH (ref 3.5–5)
POTASSIUM SERPL-SCNC: 5.2 MMOL/L — HIGH (ref 3.5–5)
PROT SERPL-MCNC: 6.1 G/DL — SIGNIFICANT CHANGE UP (ref 6–8)
RBC # BLD: 2.92 M/UL — LOW (ref 4.2–5.4)
RBC # FLD: 14.9 % — HIGH (ref 11.5–14.5)
RBC BLD AUTO: ABNORMAL
SODIUM SERPL-SCNC: 132 MMOL/L — LOW (ref 135–146)
WBC # BLD: 6.15 K/UL — SIGNIFICANT CHANGE UP (ref 4.8–10.8)
WBC # FLD AUTO: 6.15 K/UL — SIGNIFICANT CHANGE UP (ref 4.8–10.8)

## 2024-07-07 PROCEDURE — 99232 SBSQ HOSP IP/OBS MODERATE 35: CPT

## 2024-07-07 RX ORDER — SODIUM CHLORIDE 0.9 % (FLUSH) 0.9 %
250 SYRINGE (ML) INJECTION
Refills: 0 | Status: DISCONTINUED | OUTPATIENT
Start: 2024-07-07 | End: 2024-07-09

## 2024-07-07 RX ADMIN — Medication 75 MILLILITER(S): at 13:07

## 2024-07-07 RX ADMIN — SACUBITRIL AND VALSARTAN 0.5 TABLET(S): 97; 103 TABLET, FILM COATED ORAL at 17:17

## 2024-07-07 RX ADMIN — Medication 1 APPLICATION(S): at 05:52

## 2024-07-07 RX ADMIN — Medication 25 MILLIGRAM(S): at 07:33

## 2024-07-07 RX ADMIN — Medication 25 MICROGRAM(S): at 05:51

## 2024-07-07 RX ADMIN — ATORVASTATIN CALCIUM 80 MILLIGRAM(S): 20 TABLET, FILM COATED ORAL at 21:59

## 2024-07-07 RX ADMIN — Medication 3 MILLIGRAM(S): at 21:59

## 2024-07-07 RX ADMIN — HEPARIN SODIUM 5000 UNIT(S): 50 INJECTION, SOLUTION INTRAVENOUS at 05:51

## 2024-07-07 RX ADMIN — CLOPIDOGREL BISULFATE 75 MILLIGRAM(S): 75 TABLET, FILM COATED ORAL at 13:07

## 2024-07-07 RX ADMIN — HEPARIN SODIUM 5000 UNIT(S): 50 INJECTION, SOLUTION INTRAVENOUS at 13:07

## 2024-07-07 RX ADMIN — HEPARIN SODIUM 5000 UNIT(S): 50 INJECTION, SOLUTION INTRAVENOUS at 21:59

## 2024-07-07 RX ADMIN — DAPAGLIFLOZIN 10 MILLIGRAM(S): 10 TABLET, FILM COATED ORAL at 13:07

## 2024-07-07 RX ADMIN — Medication 1 APPLICATION(S): at 05:56

## 2024-07-07 RX ADMIN — Medication 1 APPLICATION(S): at 21:59

## 2024-07-07 RX ADMIN — SACUBITRIL AND VALSARTAN 0.5 TABLET(S): 97; 103 TABLET, FILM COATED ORAL at 05:51

## 2024-07-07 NOTE — PROGRESS NOTE ADULT - SUBJECTIVE AND OBJECTIVE BOX
Chief complaint: Patient is a 59y old  Female who presents with a chief complaint of hyponatremia (2024 07:17)    Interval history:  Patient seen and examined at bedside this am.  Patient NAD and denies any acute overnight events.  Patient denies CP, SOB, and palpitations.    Review of systems: A complete 10-point review of systems was obtained and is negative except as stated in the interval history.    Vitals:  T(F): 97.9, Max: 98.7 ( @ 19:47)  HR: 75 (72 - 85)  BP: 91/57 (90/54 - 111/57)  RR: 17 (12 - 23)  SpO2: 100% (97% - 100%)    Ins & outs:      @ 07:01  -   @ 07:00  --------------------------------------------------------  IN: 320 mL / OUT: 700 mL / NET: -380 mL     @ 07:  -   @ 07:00  --------------------------------------------------------  IN: 100 mL / OUT: 400 mL / NET: -300 mL    06 @ 07:  -  07 @ 07:00  --------------------------------------------------------  IN: 430 mL / OUT: 250 mL / NET: 180 mL    0707 @ 07:  -  07 @ 11:45  --------------------------------------------------------  IN: 320 mL / OUT: 0 mL / NET: 320 mL      Weight trend:  Weight (kg): 84.6 ()    Physical exam:  General: No apparent distress  HEENT: Anicteric sclera. Moist mucous membranes.   Cardiac: Regular rate and rhythm. No murmurs, rubs, or gallops.   Vascular: Symmetric radial pulses. Dorsalis pedis pulses palpable.   Respiratory: Normal effort. Clear to auscultation.   Abdomen: Soft, nontender. Audible bowel sounds.   Extremities: Warm with no edema. No cyanosis or clubbing.   Skin: Warm and dry. No rash.   Neurologic: Grossly normal motor function.   Psychiatric: Oriented to person, place, and time.     Data reviewed:  - Telemetry: SR with 1st degree AV block at 68-93 bpm  - EC24: NSR w/ 1st degree AV block   - TTE:   23: LV EF 25-30%. Moderate (grade II) diastolic dysfunction. Severely enlarged right ventricle (RVEDD = 5.3cm) with low-normal function. Moderately enlarged right atrium. Mildly enlarged left atrium. Moderate-severe tricuspid regurgitation with hepatic vein flow reversal. Mild aortic valve stenosis (Vmax 2.4m/s, mean PG 12mmHg, SHILPI 1.79cm2). Mild aortic regurgitation. The mitral valve leaflets are tethered with trace regurgitation. Severe pulmonary hypertension (PASP is at least 60mmHg). The IVC is dilated (2.8cm) with <50% collapsibility indicating increased right atrial pressure. PASP may be underestimated due to TR severity. Small pericardial effusion without echocardiographic signs of tamponade physiology.  - Chest x-ray:   24: Cardiomegaly, unchanged. No acute infiltrates.  - Stress test:   21:   1. MODERATE REVERSIBLE DEFECT IN THE SEPTUM, ANTERIOR AND INFERIOR WALL OF THE LEFT VENTRICLE CONSISTENT WITH ISCHEMIA.  2. DILATED LEFT VENTRICLE WITH MARKED GLOBAL HYPOKINESIS. THERE IS INCOMPLETE THICKENING OF THE SEPTUM ANTERIOR AND INFERIOR WALL OF THE LEFT VENTRICLE RAISING QUESTION OF HIBERNATING MYOCARDIUM. THE LATERAL WALL OF THE LEFT VENTRICLE THICKENS NORMALLY.  3. LEFT VENTRICULAR EJECTION FRACTION OF <20 % WHICH IS VERY LOW. ECHOCARDIOGRAPHIC ESTIMATED EJECTION FRACTION 2021 WAS 35 TO 40%.  - Cardiac catheterization:    RCA disease - RICARDO x 1    - Labs:                        7.9    6.15  )-----------( 150      ( 2024 04:44 )             25.5         132<L>  |  99  |  22<H>  ----------------------------<  118<H>  5.2<H>   |  24  |  1.6<H>    Ca    9.2      2024 04:44  Mg     1.8         TPro  6.1  /  Alb  3.6  /  TBili  0.3  /  DBili  x   /  AST  16  /  ALT  11  /  AlkPhos  130<H>                  Urinalysis Basic - ( 2024 04:44 )    Color: x / Appearance: x / SG: x / pH: x  Gluc: 118 mg/dL / Ketone: x  / Bili: x / Urobili: x   Blood: x / Protein: x / Nitrite: x   Leuk Esterase: x / RBC: x / WBC x   Sq Epi: x / Non Sq Epi: x / Bacteria: x        Medications:  atorvastatin 80 milliGRAM(s) Oral at bedtime  calamine/zinc oxide Lotion 1 Application(s) Topical three times a day  chlorhexidine 2% Cloths 1 Application(s) Topical <User Schedule>  clopidogrel Tablet 75 milliGRAM(s) Oral daily  dapagliflozin 10 milliGRAM(s) Oral daily  heparin   Injectable 5000 Unit(s) SubCutaneous every 8 hours  levothyroxine 25 MICROGram(s) Oral daily  metoprolol succinate ER 25 milliGRAM(s) Oral daily  sacubitril 24 mG/valsartan 26 mG 0.5 Tablet(s) Oral two times a day    Drips:    PRN:     Allergies    No Known Allergies    Intolerances

## 2024-07-07 NOTE — PROGRESS NOTE ADULT - ASSESSMENT
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  Patient was given 500 cc bolus NS and admitted for hyponatremia and JEMIMA. Pt coming from stepdown 4T to 4T cardiac telemetry to restart home GDMT meds.     Problems discussed and associated plan:  # Severe Pulm HTN  - Home meds: Entresto, Spironolactone, Dapagliflozin, Torsemide  - Continue to hold: Spironolactone and Torsemide    - Continue Toprol 25mg daily   - 7/5 restarted 1/2 dose Entresto 24/26mg  - 7/5 restarted Farxiga 10mg   - consider start half dose spironolactone 25mg dependent on K  - hold off torsemide as IVC 1.8 and collapsing   - Continue Atorvastatin 80 mg daily     # JEMIMA -  - Baseline Cr 1.3  - 7/7 Cr 1.6   - Reintroducing GDMT    # Hypovolemic Shock   - resolved      # Hyponatremia secondary to poor po intake and diuretics  - resolved     # CAD s/p stent HFrEF 25%/ prior ICD  - Continue Plavix 75 mg daily  - Continue Toprol XL 25 mg daily   - Continue Atorvastatin 80 mg daily     # UTI  - s/p IV abx     # Hypothyroidism  - c/w synthroid 25mcg daily     # Diffuse Skin Rash  - Continue wound care as per Derm    #Misc  -PT consult   - consult     GI prophylaxis: None  DVT prophylaxis: Heparin  Full code.      Please contact me with any questions or concerns at [phone]. 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  Patient was given 500 cc bolus NS and admitted for hyponatremia and JEMIMA. Pt coming from stepdown 4T to 4T cardiac telemetry to restart home GDMT meds.     Problems discussed and associated plan:  # Severe Pulm HTN  - Home meds: Entresto, Spironolactone, Dapagliflozin, Torsemide  - Continue to hold: Spironolactone and Torsemide    - Continue Toprol 25mg daily   - 7/5 restarted 1/2 dose Entresto 24/26mg  - 7/5 restarted Farxiga 10mg   - consider start half dose spironolactone 25mg dependent on K  - hold off torsemide as IVC 1.8 and collapsing   - Continue Atorvastatin 80 mg daily     # JEMIMA -  - Baseline Cr 1.3  - 7/7 Cr 1.6   - NS 250cc at 75cc/hr  - Reintroducing GDMT    # Hypovolemic Shock   - resolved      # Hyponatremia secondary to poor po intake and diuretics  - resolved     # CAD s/p stent HFrEF 25%/ prior ICD  - Continue Plavix 75 mg daily  - Continue Toprol XL 25 mg daily   - Continue Atorvastatin 80 mg daily     # UTI  - s/p IV abx     # Hypothyroidism  - c/w synthroid 25mcg daily     # Diffuse Skin Rash  - Continue wound care as per Derm    #Misc  -PT consult   - consult     GI prophylaxis: None  DVT prophylaxis: Heparin  Full code.      Please contact me with any questions or concerns at [phone].

## 2024-07-08 ENCOUNTER — TRANSCRIPTION ENCOUNTER (OUTPATIENT)
Age: 59
End: 2024-07-08

## 2024-07-08 DIAGNOSIS — Z71.89 OTHER SPECIFIED COUNSELING: ICD-10-CM

## 2024-07-08 DIAGNOSIS — I50.9 HEART FAILURE, UNSPECIFIED: ICD-10-CM

## 2024-07-08 DIAGNOSIS — R63.8 OTHER SYMPTOMS AND SIGNS CONCERNING FOOD AND FLUID INTAKE: ICD-10-CM

## 2024-07-08 DIAGNOSIS — Z51.5 ENCOUNTER FOR PALLIATIVE CARE: ICD-10-CM

## 2024-07-08 LAB
ALBUMIN SERPL ELPH-MCNC: 3.7 G/DL — SIGNIFICANT CHANGE UP (ref 3.5–5.2)
ALP SERPL-CCNC: 144 U/L — HIGH (ref 30–115)
ALT FLD-CCNC: 13 U/L — SIGNIFICANT CHANGE UP (ref 0–41)
ANION GAP SERPL CALC-SCNC: 12 MMOL/L — SIGNIFICANT CHANGE UP (ref 7–14)
AST SERPL-CCNC: 19 U/L — SIGNIFICANT CHANGE UP (ref 0–41)
BASOPHILS # BLD AUTO: 0.09 K/UL — SIGNIFICANT CHANGE UP (ref 0–0.2)
BASOPHILS NFR BLD AUTO: 1.8 % — HIGH (ref 0–1)
BILIRUB SERPL-MCNC: 0.3 MG/DL — SIGNIFICANT CHANGE UP (ref 0.2–1.2)
BUN SERPL-MCNC: 22 MG/DL — HIGH (ref 10–20)
CALCIUM SERPL-MCNC: 9.1 MG/DL — SIGNIFICANT CHANGE UP (ref 8.4–10.5)
CHLORIDE SERPL-SCNC: 105 MMOL/L — SIGNIFICANT CHANGE UP (ref 98–110)
CO2 SERPL-SCNC: 22 MMOL/L — SIGNIFICANT CHANGE UP (ref 17–32)
CREAT SERPL-MCNC: 1.1 MG/DL — SIGNIFICANT CHANGE UP (ref 0.7–1.5)
EGFR: 59 ML/MIN/1.73M2 — LOW
EOSINOPHIL # BLD AUTO: 0.12 K/UL — SIGNIFICANT CHANGE UP (ref 0–0.7)
EOSINOPHIL NFR BLD AUTO: 2.4 % — SIGNIFICANT CHANGE UP (ref 0–8)
GLUCOSE SERPL-MCNC: 120 MG/DL — HIGH (ref 70–99)
HCT VFR BLD CALC: 26.7 % — LOW (ref 37–47)
HGB BLD-MCNC: 8.4 G/DL — LOW (ref 12–16)
IMM GRANULOCYTES NFR BLD AUTO: 5.1 % — HIGH (ref 0.1–0.3)
LYMPHOCYTES # BLD AUTO: 1.52 K/UL — SIGNIFICANT CHANGE UP (ref 1.2–3.4)
LYMPHOCYTES # BLD AUTO: 30.7 % — SIGNIFICANT CHANGE UP (ref 20.5–51.1)
MAGNESIUM SERPL-MCNC: 1.7 MG/DL — LOW (ref 1.8–2.4)
MCHC RBC-ENTMCNC: 27.6 PG — SIGNIFICANT CHANGE UP (ref 27–31)
MCHC RBC-ENTMCNC: 31.5 G/DL — LOW (ref 32–37)
MCV RBC AUTO: 87.8 FL — SIGNIFICANT CHANGE UP (ref 81–99)
MONOCYTES # BLD AUTO: 0.51 K/UL — SIGNIFICANT CHANGE UP (ref 0.1–0.6)
MONOCYTES NFR BLD AUTO: 10.3 % — HIGH (ref 1.7–9.3)
NEUTROPHILS # BLD AUTO: 2.46 K/UL — SIGNIFICANT CHANGE UP (ref 1.4–6.5)
NEUTROPHILS NFR BLD AUTO: 49.7 % — SIGNIFICANT CHANGE UP (ref 42.2–75.2)
NRBC # BLD: 0 /100 WBCS — SIGNIFICANT CHANGE UP (ref 0–0)
PLATELET # BLD AUTO: 171 K/UL — SIGNIFICANT CHANGE UP (ref 130–400)
PMV BLD: 10.8 FL — HIGH (ref 7.4–10.4)
POTASSIUM SERPL-MCNC: 5.7 MMOL/L — HIGH (ref 3.5–5)
POTASSIUM SERPL-SCNC: 5.7 MMOL/L — HIGH (ref 3.5–5)
PROT SERPL-MCNC: 6.6 G/DL — SIGNIFICANT CHANGE UP (ref 6–8)
RBC # BLD: 3.04 M/UL — LOW (ref 4.2–5.4)
RBC # FLD: 15.3 % — HIGH (ref 11.5–14.5)
SODIUM SERPL-SCNC: 139 MMOL/L — SIGNIFICANT CHANGE UP (ref 135–146)
WBC # BLD: 4.95 K/UL — SIGNIFICANT CHANGE UP (ref 4.8–10.8)
WBC # FLD AUTO: 4.95 K/UL — SIGNIFICANT CHANGE UP (ref 4.8–10.8)

## 2024-07-08 PROCEDURE — 99232 SBSQ HOSP IP/OBS MODERATE 35: CPT | Mod: 25

## 2024-07-08 PROCEDURE — 99497 ADVNCD CARE PLAN 30 MIN: CPT

## 2024-07-08 PROCEDURE — 99232 SBSQ HOSP IP/OBS MODERATE 35: CPT

## 2024-07-08 RX ORDER — SODIUM ZIRCONIUM CYCLOSILICATE 10 G/10G
5 POWDER, FOR SUSPENSION ORAL ONCE
Refills: 0 | Status: COMPLETED | OUTPATIENT
Start: 2024-07-08 | End: 2024-07-08

## 2024-07-08 RX ORDER — MAGNESIUM OXIDE 400 MG/1
400 TABLET ORAL ONCE
Refills: 0 | Status: COMPLETED | OUTPATIENT
Start: 2024-07-08 | End: 2024-07-08

## 2024-07-08 RX ORDER — MAGNESIUM SULFATE 100 %
2 POWDER (GRAM) MISCELLANEOUS ONCE
Refills: 0 | Status: COMPLETED | OUTPATIENT
Start: 2024-07-08 | End: 2024-07-08

## 2024-07-08 RX ADMIN — ATORVASTATIN CALCIUM 80 MILLIGRAM(S): 20 TABLET, FILM COATED ORAL at 21:37

## 2024-07-08 RX ADMIN — Medication 1 APPLICATION(S): at 14:17

## 2024-07-08 RX ADMIN — CLOPIDOGREL BISULFATE 75 MILLIGRAM(S): 75 TABLET, FILM COATED ORAL at 11:33

## 2024-07-08 RX ADMIN — HEPARIN SODIUM 5000 UNIT(S): 50 INJECTION, SOLUTION INTRAVENOUS at 14:16

## 2024-07-08 RX ADMIN — Medication 1 APPLICATION(S): at 06:18

## 2024-07-08 RX ADMIN — HEPARIN SODIUM 5000 UNIT(S): 50 INJECTION, SOLUTION INTRAVENOUS at 21:37

## 2024-07-08 RX ADMIN — Medication 25 GRAM(S): at 09:50

## 2024-07-08 RX ADMIN — Medication 25 MICROGRAM(S): at 06:18

## 2024-07-08 RX ADMIN — HEPARIN SODIUM 5000 UNIT(S): 50 INJECTION, SOLUTION INTRAVENOUS at 06:19

## 2024-07-08 RX ADMIN — SODIUM ZIRCONIUM CYCLOSILICATE 5 GRAM(S): 10 POWDER, FOR SUSPENSION ORAL at 09:50

## 2024-07-08 RX ADMIN — Medication 25 MILLIGRAM(S): at 06:19

## 2024-07-08 RX ADMIN — MAGNESIUM OXIDE 400 MILLIGRAM(S): 400 TABLET ORAL at 11:33

## 2024-07-08 RX ADMIN — Medication 1 APPLICATION(S): at 21:39

## 2024-07-08 RX ADMIN — Medication 3 MILLIGRAM(S): at 21:37

## 2024-07-08 RX ADMIN — SACUBITRIL AND VALSARTAN 0.5 TABLET(S): 97; 103 TABLET, FILM COATED ORAL at 17:19

## 2024-07-08 RX ADMIN — SACUBITRIL AND VALSARTAN 0.5 TABLET(S): 97; 103 TABLET, FILM COATED ORAL at 06:19

## 2024-07-08 RX ADMIN — DAPAGLIFLOZIN 10 MILLIGRAM(S): 10 TABLET, FILM COATED ORAL at 11:32

## 2024-07-08 NOTE — DISCHARGE NOTE PROVIDER - HOSPITAL COURSE
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  Patient was given 500 cc bolus NS and admitted for hyponatremia and JEMIMA. Pt coming from stepdown 4T to 4T cardiac telemetry to restart home GDMT meds. Patient's K high during admission so was unable to restart half dose spirolactone. Patient will be discharged with [facility]. Mrs. Esme Ramos is a 58 yo F PMHx of  HFrEF with LVEF 25%, prior ICD which was removed in s/o infection, severe pHTN, CAD s/p PCI, HTN, DM, CKD (baseline Cr 1.3), CORNELIUS (not on CPAP), COPD on 4L NC, and history of CVA with residual LLE weakness (wheelchair dependent at baseline) who presented with hyponatremia and prerenal JEMIMA in the setting of 10 days of poor PO intake while still taking her Entresto, spironolactone, Farxiga, and torsemide. Patient was treated with IVF and levophed for hypotension/ hypovolemic shock, with improvement of Na and Cr and was ultimately weened off BP support, now at baseline. She was downgraded to Cardiac Tele on 7/2/24 for slow reintroduction of her GDMT. Patient states her appetite has returned to normal. During her hospital course patients home medications were re-introduced as tolerated, her current medication regimen is:    Plavix 75mg Daily  Farxiga 10mg Daily  Entresto 0.5 tablet 24/26mg BID  Toprol XL 25mg   Atorvastatin 80mg HS  Levothyroxine 25 mcg    Patients home dose spironolactone was not restarted as patients BMP with persistent hyperkalemia for which Lokelma 5g was given. She will go home on ______    Patients hospital course also notable for asymptomatic UA showing showing +Leukocytes & Bacteria, therefore she was treated with CTX and completed a 4 day course of IV antibiotics.     Prior to DC, patient was seen by palliative care team who discussed patients advanced directives and code status, patient deferred conversation to her brother Pérez who is her HCP, and patient and her brother wish for her to remain FULL CODE at this time and will plan to discuss further plans of care when she is discharged home.     PT has been consulted and worked with the patient and she is hemodynamically stable to be discharged home today with f/u with ___________ in 2 weeks.    60 yo F PMHx of  HFrEF (TTE 7/9 LVEF 30-35%, mod-severe MR), prior ICD which was removed in s/o infection, severe pHTN, CAD s/p PCI, HTN, DM, CKD (baseline Cr 1.3), CORNELIUS (not on CPAP), COPD on 4L NC, and history of CVA with residual LLE weakness (wheelchair dependent at baseline) who presented with hyponatremia and prerenal JEMIMA in the setting of 10 days of poor PO intake while still taking her Entresto, spironolactone, Farxiga, and torsemide. Patient was treated with IVF and levophed for hypotension/ hypovolemic shock, with improvement of Na and Cr and was ultimately weened off BP support, now at baseline. She was downgraded to Cardiac Tele on 7/2/24 for slow reintroduction of her GDMT. Patient states her appetite has returned to normal. During her hospital course patients home medications were re-introduced as tolerated, her current medication regimen is:    Plavix 75mg Daily  Farxiga 10mg Daily  Entresto 0.5 tablet 24/26mg BID  Toprol XL 25mg   Atorvastatin 80mg HS  Levothyroxine 25 mcg    Patients home dose spironolactone was not restarted as patients BMP with persistent hyperkalemia for which Lokelma 5g was given. She will go home on Lokelma 10 g.     Patients hospital course also notable for asymptomatic UA showing showing +Leukocytes & Bacteria, therefore she was treated with CTX and completed a 4 day course of IV antibiotics.     Prior to DC, patient was seen by palliative care team who discussed patients advanced directives and code status, patient deferred conversation to her brother Pérez who is her HCP, and patient and her brother wish for her to remain FULL CODE at this time and will plan to discuss further plans of care when she is discharged home.     PT has been consulted and worked with the patient and she is hemodynamically stable to be discharged home today with f/u with  in 2 weeks and repeat blood work (SMA-7 panel and Mg)   Pt was given Mg oral supplementation for Mg 1.6    60 yo F PMHx of  HFrEF (TTE 7/9 LVEF 30-35%, mod-severe MR), prior ICD which was removed in s/o infection, severe pHTN, CAD s/p PCI, HTN, DM, CKD (baseline Cr 1.3), CORNELIUS (not on CPAP), COPD on 4L NC, and history of CVA with residual LLE weakness (wheelchair dependent at baseline) who presented with hyponatremia and prerenal JEMIMA in the setting of 10 days of poor PO intake while still taking her Entresto, spironolactone, Farxiga, and torsemide. Patient was treated with IVF and levophed for hypotension/ hypovolemic shock, with improvement of Na and Cr and was ultimately weened off BP support, now at baseline. She was downgraded to Cardiac Tele on 7/2/24 for slow reintroduction of her GDMT. Patient states her appetite has returned to normal. During her hospital course patients home medications were re-introduced as tolerated, her current medication regimen is:    Plavix 75mg Daily  Farxiga 10mg Daily  Entresto 0.5 tablet 24/26mg BID  Toprol XL 25mg   Atorvastatin 80mg HS  Levothyroxine 25 mcg    Patients home dose spironolactone was not restarted as patients BMP with persistent hyperkalemia for which Lokelma 5g was given. She will go home on Lokelma 5 g.     Patients hospital course also notable for asymptomatic UA showing showing +Leukocytes & Bacteria, therefore she was treated with CTX and completed a 4 day course of IV antibiotics.     Prior to DC, patient was seen by palliative care team who discussed patients advanced directives and code status, patient deferred conversation to her brother Pérez who is her HCP, and patient and her brother wish for her to remain FULL CODE at this time and will plan to discuss further plans of care when she is discharged home.     PT has been consulted and worked with the patient and she is hemodynamically stable to be discharged home today with f/u with  in 2 weeks and repeat blood work (SMA-7 panel and Mg)

## 2024-07-08 NOTE — DISCHARGE NOTE PROVIDER - NSDCMRMEDTOKEN_GEN_ALL_CORE_FT
atorvastatin 40 mg oral tablet: 1 tab(s) orally once a day   Farxiga 10 mg oral tablet: 1 tab(s) orally once a day  magnesium gluconate 500 mg oral tablet: 1 tab(s) orally 2 times a day  metoprolol succinate 50 mg oral capsule, extended release: 1 cap(s) orally once a day  Plavix 75 mg oral tablet: 1 tab(s) orally once a day  sacubitril-valsartan 49 mg-51 mg oral tablet: 1 tab(s) orally 2 times a day  spironolactone 25 mg oral tablet: 1 tab(s) orally once a day  Synthroid 25 mcg (0.025 mg) oral tablet: 1 tab(s) orally once a day  torsemide 20 mg oral tablet: 1 tab(s) orally 2 times a day   atorvastatin 80 mg oral tablet: 1 tab(s) orally once a day (at bedtime)  Farxiga 10 mg oral tablet: 1 tab(s) orally once a day  levothyroxine 25 mcg (0.025 mg) oral tablet: 1 tab(s) orally once a day  magnesium oxide 400 mg oral tablet: 1 tab(s) orally 2 times a day take with food  metoprolol succinate 50 mg oral capsule, extended release: 1 cap(s) orally once a day  Plavix 75 mg oral tablet: 1 tab(s) orally once a day  sacubitril-valsartan 24 mg-26 mg oral tablet: 0.5 tab(s) orally 2 times a day  sodium zirconium cyclosilicate 10 g oral powder for reconstitution: 10 gram(s) orally once a day  torsemide 20 mg oral tablet: 1 tab(s) orally once a day   atorvastatin 80 mg oral tablet: 1 tab(s) orally once a day (at bedtime)  Farxiga 10 mg oral tablet: 1 tab(s) orally once a day  levothyroxine 25 mcg (0.025 mg) oral tablet: 1 tab(s) orally once a day  magnesium oxide 400 mg oral tablet: 1 tab(s) orally 2 times a day take with food  metoprolol succinate 50 mg oral capsule, extended release: 1 cap(s) orally once a day  Plavix 75 mg oral tablet: 1 tab(s) orally once a day  sodium zirconium cyclosilicate 10 g oral powder for reconstitution: 10 gram(s) orally once a day  torsemide 20 mg oral tablet: 1 tab(s) orally once a day

## 2024-07-08 NOTE — PROGRESS NOTE ADULT - CONVERSATION DETAILS
Spoke with patient at bedside. She remembers speaking to Dr. Stanislav Schulte re: advance directives. She thought about this, and she wants her brother to make all decisions for her should something happen (i.e. she become critically ill). She is agreeable to me reaching out to him. She is aware he is her HCP and she has been speaking with him every day. Spoke with patient at bedside. She remembers speaking to Dr. Stanislav Schulte re: advance directives. She thought about this, and she wants her brother to make all decisions for her should something happen (i.e. she become critically ill). She is agreeable to me reaching out to him. She is aware he is her HCP and she has been speaking with him every day.    Spoke with Pérez on the phone. He is aware that he is her HCP and he is aware she defers to him for any major medical decisions. Discussed advance directives including CPR and intubation. Explained that with patients who have advanced illness, CPR can often be more burdensome than beneficial. Also discussed trial of intubation versus long term intubation. He feels that his main concern is that when his sister does reach end of life, she has dignity and comfort, He agrees that these things should be discussed and plans to address these with his sister once she is discharged home. He is in [place] however he visits the patient multiple times/year.

## 2024-07-08 NOTE — PROGRESS NOTE ADULT - ASSESSMENT
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  Patient was given 500 cc bolus NS and admitted for hyponatremia and JEMIMA. Pt coming from stepdown 4T to 4T cardiac telemetry to restart home GDMT meds.     Problems discussed and associated plan:  # Severe Pulm HTN  - Home meds: Entresto, Spironolactone, Dapagliflozin, Torsemide  - Continue to hold: Spironolactone and Torsemide    - Continue Toprol 25mg daily   - 7/5 restarted 1/2 dose Entresto 24/26mg  - 7/5 restarted Farxiga 10mg   - Sam on hold, K elevated, lokelma  - hold off torsemide as IVC 1.8 and collapsing   - Continue Atorvastatin 80 mg daily     # JEMIMA -  - Baseline Cr 1.3  - 7/7 Cr 1.6   - NS 250cc at 75cc/hr  - Reintroducing GDMT    # Hypovolemic Shock   - resolved      # Hyponatremia secondary to poor po intake and diuretics  - resolved     # CAD s/p stent HFrEF 25%/ prior ICD  - Continue Plavix 75 mg daily  - Continue Toprol XL 25 mg daily   - Continue Atorvastatin 80 mg daily     # UTI  - s/p IV abx     # Hypothyroidism  - c/w synthroid 25mcg daily     # Diffuse Skin Rash  - Continue wound care as per Derm    #Misc  -PT consult   - consult     GI prophylaxis: None  DVT prophylaxis: Heparin  Full code.      Please contact me with any questions or concerns at [phone].

## 2024-07-08 NOTE — PROGRESS NOTE ADULT - SUBJECTIVE AND OBJECTIVE BOX
Chief complaint: Patient is a 59y old  Female who presents with a chief complaint of hyponatremia (06 Jul 2024 07:17)    Interval history: Patient seen and examined at bedside this am.  Patient denies CP, SOB, and palpitations.    Review of systems: A complete 10-point review of systems was obtained and is negative except as stated in the interval history.    Vitals:  ICU Vital Signs Last 24 Hrs  T(C): 36.8 (08 Jul 2024 08:06), Max: 37.2 (07 Jul 2024 23:43)  T(F): 98.2 (08 Jul 2024 08:06), Max: 98.9 (07 Jul 2024 23:43)  HR: 91 (08 Jul 2024 08:06) (86 - 92)  BP: 107/59 (08 Jul 2024 08:06) (91/58 - 107/59)  BP(mean): 77 (08 Jul 2024 08:06) (70 - 77)  RR: 20 (08 Jul 2024 08:06) (20 - 20)  SpO2: 96% (08 Jul 2024 08:06) (96% - 100%)    O2 Parameters below as of 07 Jul 2024 23:43  Patient On (Oxygen Delivery Method): nasal cannula  O2 Flow (L/min): 4        Ins & outs:     07-04 @ 07:01  -  07-05 @ 07:00  --------------------------------------------------------  IN: 320 mL / OUT: 700 mL / NET: -380 mL    07-05 @ 07:01  -  07-06 @ 07:00  --------------------------------------------------------  IN: 100 mL / OUT: 400 mL / NET: -300 mL    07-06 @ 07:01  -  07-07 @ 07:00  --------------------------------------------------------  IN: 430 mL / OUT: 250 mL / NET: 180 mL    07-07 @ 07:01  -  07-07 @ 11:45  --------------------------------------------------------  IN: 320 mL / OUT: 0 mL / NET: 320 mL      Weight trend:  Weight (kg): 84.6 (07-02)    Physical exam:  General: No apparent distress.  HEENT: Anicteric sclera. Moist mucous membranes.   Cardiac: Regular rate and rhythm. No murmurs, rubs, or gallops.   Vascular: Symmetric radial pulses. Dorsalis pedis pulses palpable.   Respiratory: Normal effort. Clear to auscultation.   Abdomen: Soft, nontender. Audible bowel sounds.   Extremities: Warm with no edema. No cyanosis or clubbing.   Skin: Warm and dry. No rash.   Neurologic: Grossly normal motor function.   Psychiatric: Oriented to person, place, and time.     Data reviewed:  - Telemetry: SR with 1st degree AV block at 68-93 bpm    - ECG 6/30/24: NSR w/ 1st degree AV block     - TTE:   11/11/23: LV EF 25-30%. Moderate (grade II) diastolic dysfunction. Severely enlarged right ventricle (RVEDD = 5.3cm) with low-normal function. Moderately enlarged right atrium. Mildly enlarged left atrium. Moderate-severe tricuspid regurgitation with hepatic vein flow reversal. Mild aortic valve stenosis (Vmax 2.4m/s, mean PG 12mmHg, SHILPI 1.79cm2). Mild aortic regurgitation. The mitral valve leaflets are tethered with trace regurgitation. Severe pulmonary hypertension (PASP is at least 60mmHg). The IVC is dilated (2.8cm) with <50% collapsibility indicating increased right atrial pressure. PASP may be underestimated due to TR severity. Small pericardial effusion without echocardiographic signs of tamponade physiology.    - Chest x-ray:   6/30/24: Cardiomegaly, unchanged. No acute infiltrates.    - Stress test:   6/16/21:   1. MODERATE REVERSIBLE DEFECT IN THE SEPTUM, ANTERIOR AND INFERIOR WALL OF THE LEFT VENTRICLE CONSISTENT WITH ISCHEMIA.  2. DILATED LEFT VENTRICLE WITH MARKED GLOBAL HYPOKINESIS. THERE IS INCOMPLETE THICKENING OF THE SEPTUM ANTERIOR AND INFERIOR WALL OF THE LEFT VENTRICLE RAISING QUESTION OF HIBERNATING MYOCARDIUM. THE LATERAL WALL OF THE LEFT VENTRICLE THICKENS NORMALLY.  3. LEFT VENTRICULAR EJECTION FRACTION OF <20 % WHICH IS VERY LOW. ECHOCARDIOGRAPHIC ESTIMATED EJECTION FRACTION 6/14/2021 WAS 35 TO 40%.    - Cardiac catheterization:    RCA disease - RICARDO x 1      - Labs:                        8.4    4.95  )-----------( 171      ( 08 Jul 2024 05:33 )             26.7     07-08    139  |  105  |  22<H>  ----------------------------<  120<H>  5.7<H>   |  22  |  1.1    Ca    9.1      08 Jul 2024 05:33  Mg     1.7     07-08    TPro  6.6  /  Alb  3.7  /  TBili  0.3  /  DBili  x   /  AST  19  /  ALT  13  /  AlkPhos  144<H>  07-08    LIVER FUNCTIONS - ( 08 Jul 2024 05:33 )  Alb: 3.7 g/dL / Pro: 6.6 g/dL / ALK PHOS: 144 U/L / ALT: 13 U/L / AST: 19 U/L / GGT: x           Lactate Trend    Urinalysis Basic - ( 08 Jul 2024 05:33 )    Color: x / Appearance: x / SG: x / pH: x  Gluc: 120 mg/dL / Ketone: x  / Bili: x / Urobili: x   Blood: x / Protein: x / Nitrite: x   Leuk Esterase: x / RBC: x / WBC x   Sq Epi: x / Non Sq Epi: x / Bacteria: x          Medications:  MEDICATIONS  (STANDING):  atorvastatin 80 milliGRAM(s) Oral at bedtime  calamine/zinc oxide Lotion 1 Application(s) Topical three times a day  chlorhexidine 2% Cloths 1 Application(s) Topical <User Schedule>  clopidogrel Tablet 75 milliGRAM(s) Oral daily  dapagliflozin 10 milliGRAM(s) Oral daily  heparin   Injectable 5000 Unit(s) SubCutaneous every 8 hours  levothyroxine 25 MICROGram(s) Oral daily  metoprolol succinate ER 25 milliGRAM(s) Oral daily  sacubitril 24 mG/valsartan 26 mG 0.5 Tablet(s) Oral two times a day  sodium chloride 0.9%. 250 milliLiter(s) (75 mL/Hr) IV Continuous <Continuous>    MEDICATIONS  (PRN):  acetaminophen     Tablet .. 650 milliGRAM(s) Oral every 6 hours PRN Mild Pain (1 - 3), Moderate Pain (4 - 6)  melatonin 3 milliGRAM(s) Oral at bedtime PRN Insomnia      Allergies    No Known Allergies    Intolerances       Chief complaint: Patient is a 59y old  Female who presents with a chief complaint of hyponatremia (06 Jul 2024 07:17)    Interval history: Patient seen and examined at bedside this am.  Patient denies CP, SOB, and palpitations.    Review of systems: A complete 10-point review of systems was obtained and is negative except as stated in the interval history.    Vitals:   ICU Vital Signs Last 24 Hrs  T(C): 36.8 (08 Jul 2024 08:06), Max: 37.2 (07 Jul 2024 23:43)  T(F): 98.2 (08 Jul 2024 08:06), Max: 98.9 (07 Jul 2024 23:43)  HR: 91 (08 Jul 2024 08:06) (86 - 92)  BP: 107/59 (08 Jul 2024 08:06) (91/58 - 107/59)  BP(mean): 77 (08 Jul 2024 08:06) (70 - 77)  RR: 20 (08 Jul 2024 08:06) (20 - 20)  SpO2: 96% (08 Jul 2024 08:06) (96% - 100%)    O2 Parameters below as of 07 Jul 2024 23:43  Patient On (Oxygen Delivery Method): nasal cannula  O2 Flow (L/min): 4        Ins & outs:     07-04 @ 07:01  -  07-05 @ 07:00  --------------------------------------------------------  IN: 320 mL / OUT: 700 mL / NET: -380 mL    07-05 @ 07:01  -  07-06 @ 07:00  --------------------------------------------------------  IN: 100 mL / OUT: 400 mL / NET: -300 mL    07-06 @ 07:01  -  07-07 @ 07:00  --------------------------------------------------------  IN: 430 mL / OUT: 250 mL / NET: 180 mL    07-07 @ 07:01  -  07-07 @ 11:45  --------------------------------------------------------  IN: 320 mL / OUT: 0 mL / NET: 320 mL      Weight trend:  Weight (kg): 84.6 (07-02)    Physical exam:  General: No apparent distress.  HEENT: Anicteric sclera. Moist mucous membranes.   Cardiac: Regular rate and rhythm. No murmurs, rubs, or gallops.   Vascular: Symmetric radial pulses. Dorsalis pedis pulses palpable.   Respiratory: Normal effort. Clear to auscultation.   Abdomen: Soft, nontender. Audible bowel sounds.   Extremities: Warm with no edema. No cyanosis or clubbing.   Skin: Warm and dry. No rash.   Neurologic: Grossly normal motor function.   Psychiatric: Oriented to person, place, and time.     Data reviewed:  - Telemetry: SR with 1st degree AV block at 68-93 bpm    - ECG 6/30/24: NSR w/ 1st degree AV block     - TTE:   11/11/23: LV EF 25-30%. Moderate (grade II) diastolic dysfunction. Severely enlarged right ventricle (RVEDD = 5.3cm) with low-normal function. Moderately enlarged right atrium. Mildly enlarged left atrium. Moderate-severe tricuspid regurgitation with hepatic vein flow reversal. Mild aortic valve stenosis (Vmax 2.4m/s, mean PG 12mmHg, SHILPI 1.79cm2). Mild aortic regurgitation. The mitral valve leaflets are tethered with trace regurgitation. Severe pulmonary hypertension (PASP is at least 60mmHg). The IVC is dilated (2.8cm) with <50% collapsibility indicating increased right atrial pressure. PASP may be underestimated due to TR severity. Small pericardial effusion without echocardiographic signs of tamponade physiology.    - Chest x-ray:   6/30/24: Cardiomegaly, unchanged. No acute infiltrates.    - Stress test:   6/16/21:   1. MODERATE REVERSIBLE DEFECT IN THE SEPTUM, ANTERIOR AND INFERIOR WALL OF THE LEFT VENTRICLE CONSISTENT WITH ISCHEMIA.  2. DILATED LEFT VENTRICLE WITH MARKED GLOBAL HYPOKINESIS. THERE IS INCOMPLETE THICKENING OF THE SEPTUM ANTERIOR AND INFERIOR WALL OF THE LEFT VENTRICLE RAISING QUESTION OF HIBERNATING MYOCARDIUM. THE LATERAL WALL OF THE LEFT VENTRICLE THICKENS NORMALLY.  3. LEFT VENTRICULAR EJECTION FRACTION OF <20 % WHICH IS VERY LOW. ECHOCARDIOGRAPHIC ESTIMATED EJECTION FRACTION 6/14/2021 WAS 35 TO 40%.    - Cardiac catheterization:    RCA disease - RICARDO x 1      - Labs:                        8.4    4.95  )-----------( 171      ( 08 Jul 2024 05:33 )             26.7     07-08    139  |  105  |  22<H>  ----------------------------<  120<H>  5.7<H>   |  22  |  1.1    Ca    9.1      08 Jul 2024 05:33  Mg     1.7     07-08    TPro  6.6  /  Alb  3.7  /  TBili  0.3  /  DBili  x   /  AST  19  /  ALT  13  /  AlkPhos  144<H>  07-08    LIVER FUNCTIONS - ( 08 Jul 2024 05:33 )  Alb: 3.7 g/dL / Pro: 6.6 g/dL / ALK PHOS: 144 U/L / ALT: 13 U/L / AST: 19 U/L / GGT: x           Lactate Trend    Urinalysis Basic - ( 08 Jul 2024 05:33 )    Color: x / Appearance: x / SG: x / pH: x  Gluc: 120 mg/dL / Ketone: x  / Bili: x / Urobili: x   Blood: x / Protein: x / Nitrite: x   Leuk Esterase: x / RBC: x / WBC x   Sq Epi: x / Non Sq Epi: x / Bacteria: x          Medications:  MEDICATIONS  (STANDING):  atorvastatin 80 milliGRAM(s) Oral at bedtime  calamine/zinc oxide Lotion 1 Application(s) Topical three times a day  chlorhexidine 2% Cloths 1 Application(s) Topical <User Schedule>  clopidogrel Tablet 75 milliGRAM(s) Oral daily  dapagliflozin 10 milliGRAM(s) Oral daily  heparin   Injectable 5000 Unit(s) SubCutaneous every 8 hours  levothyroxine 25 MICROGram(s) Oral daily  metoprolol succinate ER 25 milliGRAM(s) Oral daily  sacubitril 24 mG/valsartan 26 mG 0.5 Tablet(s) Oral two times a day  sodium chloride 0.9%. 250 milliLiter(s) (75 mL/Hr) IV Continuous <Continuous>    MEDICATIONS  (PRN):  acetaminophen     Tablet .. 650 milliGRAM(s) Oral every 6 hours PRN Mild Pain (1 - 3), Moderate Pain (4 - 6)  melatonin 3 milliGRAM(s) Oral at bedtime PRN Insomnia      Allergies    No Known Allergies    Intolerances

## 2024-07-08 NOTE — PROGRESS NOTE ADULT - SUBJECTIVE AND OBJECTIVE BOX
HPI:    58 y/o woman w PMHx of HTN, HLD, DM, CAD s/p stent, HFrEF 25%, prior ICD s/p removal due to infection, severe pulm HTN, follows w Cardio NP Cyn Manzano, COPD on 4L NC, CORNELIUS no CPAP, CKD baseline Cr 1.3, CVA w residual L sided weakness and baseline bedbound vs wheelchair reliant, hypothyroidism, presented to ED for 1 week of malaise/fatigue, decreased PO intake and insomnia, in ED found to have WBC 8.27, Na 117, K 3.3, Cr 2.3, lactate 1.2, , given 500cc NS, CXR unremarkable, admitted to medicine stepdown 6/30/24 for hyponatremia of 117 and JEMIMA.  HCP brother Pérez Ramos whose phone number she cannot recall     Pt is a very brief historian - mostly asked for food and blanket during our conversation.   States she lives at home w HHA helping w food and other home needs, as pt is bed/wheelchair reliant. States over past week she was not eating because she couldn't sleep. When asked how those two things are related, patient said "I don't know" and when asked if she had appetite, then said no. Unable to find any reason pt suddenly had insomnia; denies any new meds, new supplements, changes in medication doses.   ROS: Denies any new symptoms aside from fatigue/malaise, insomnia, and loss of appetite. Denies any CP, SOB, changes in urination, n/v/d, abd pain, leg swelling. No recent travel or sick contacts. No fevers, chills.     Triage vitals were: 92/65, 74bpm, RR20, 100% on RA, 98.4  Repeat vitals notable for: 102/60, 67bpm, RR18, 98% on 4L   Labs notable for: WBC 8.27, Na 117, K 3.3, Cr 2.3, lactate 1.2, , UA(+)  Imaging: CXR unremarkable  Treatments: given 500cc NS  Pending: BNP, repeat BMP (30 Jun 2024 18:54)      Interval History  7/8: more alert and conversant. able to provide partial hospital course. she is excited to go home today. she denies any physical discomfort.       ADVANCE DIRECTIVES:    [x ] Full Code [ ] DNR  MOLST  [ ]  Living Will  [ ]   DECISION MAKER(s):   [ x] Health Care Proxy(s)  [ ] Surrogate(s)  [ ] Guardian           Name(s): Phone Number(s): Aline Ortez (HCP form is in [employer])     BASELINE (I)ADL(s) (prior to admission):  [place]: [ ]Total  [ ] Moderate [ x]Dependent  Palliative Performance Status Version 2:  40       %    http://npcrc.org/files/news/palliative_performance_scale_[email or web address].pdf    Allergies    No Known Allergies    Intolerances    MEDICATIONS  (STANDING):  atorvastatin 80 milliGRAM(s) Oral at bedtime  calamine/zinc oxide Lotion 1 Application(s) Topical three times a day  cefTRIAXone   IVPB 1000 milliGRAM(s) IV Intermittent every 24 hours  chlorhexidine 2% Cloths 1 Application(s) Topical <User Schedule>  clopidogrel Tablet 75 milliGRAM(s) Oral daily  dextrose 10% Bolus 125 milliLiter(s) IV Bolus once  dextrose 5%. 1000 milliLiter(s) (50 mL/Hr) IV Continuous <Continuous>  dextrose 5%. 1000 milliLiter(s) (100 mL/Hr) IV Continuous <Continuous>  dextrose 50% Injectable 25 Gram(s) IV Push once  dextrose 50% Injectable 12.5 Gram(s) IV Push once  glucagon  Injectable 1 milliGRAM(s) IntraMuscular once  heparin   Injectable 5000 Unit(s) SubCutaneous every 8 hours  insulin lispro (ADMELOG) corrective regimen sliding scale   SubCutaneous three times a day before meals  insulin lispro (ADMELOG) corrective regimen sliding scale   SubCutaneous at bedtime  levothyroxine 25 MICROGram(s) Oral daily  torsemide 10 milliGRAM(s) Oral daily    MEDICATIONS  (PRN):  acetaminophen     Tablet .. 650 milliGRAM(s) Oral every 6 hours PRN Temp greater or equal to 38C (100.4F), Mild Pain (1 - 3)  dextrose Oral Gel 15 Gram(s) Oral once PRN Blood Glucose LESS THAN 70 milliGRAM(s)/deciliter  melatonin 3 milliGRAM(s) Oral at bedtime PRN Insomnia    PRESENT SYMPTOMS: [ ]Unable to obtain due to poor mentation   Source if other than patient:  [ ]Family   [ ]Team     Pain: [ ]yes [ x]no  QOL impact -   Location -                    Aggravating factors -  Quality -  Radiation -  Timing-  Severity (0-10 scale):  Minimal acceptable level (0-10 scale):     CPOT:    https://www.[email or web address]/getattachment/[email or web address]/Critical-Care-Pain-Observation-Tool-(CPOT)    PAIN AD Score:   [email or web address].[email or web address]/painad.pdf (press ctrl +  left click to view)    Dyspnea:                           [x ]None[ ]Mild [ ]Moderate [ ]Severe     Respiratory Distress Observation Scale (RDOS):   A score of 0 to 2 signifies little or no respiratory distress, 3 signifies mild distress, scores 4 to 6 indicate moderate distress, and scores greater than 7 signify severe distress  [email or web address]-qhgodjpazbn-fvjlzbum-vjctgxisgqv-iimot.pdf    Anxiety:                             [x ]None[ ]Mild [ ]Moderate [ ]Severe   Fatigue:                             [x ]None[ ]Mild [ ]Moderate [ ]Severe   Nausea:                             [x ]None[ ]Mild [ ]Moderate [ ]Severe   Loss of appetite:              [x ]None[ ]Mild [ ]Moderate [ ]Severe   Constipation:                    [x ]None[ ]Mild [ ]Moderate [ ]Severe    Other Symptoms:  [x ]All other review of systems negative     Palliative Performance Status Version 2:      30   %    http://[email or web address].org/files/news/palliative_performance_scale_[email or web address].pdf    PHYSICAL EXAM:  ICU Vital Signs Last 24 Hrs  T(C): 36.8 (08 Jul 2024 08:06), Max: 37.2 (07 Jul 2024 23:43)  T(F): 98.2 (08 Jul 2024 08:06), Max: 98.9 (07 Jul 2024 23:43)  HR: 91 (08 Jul 2024 08:06) (86 - 92)  BP: 107/59 (08 Jul 2024 08:06) (91/58 - 107/59)  BP(mean): 77 (08 Jul 2024 08:06) (70 - 77)  RR: 20 (08 Jul 2024 08:06) (20 - 20)  SpO2: 96% (08 Jul 2024 08:06) (96% - 100%)  O2 Parameters below as of 07 Jul 2024 23:43  Patient On (Oxygen Delivery Method): nasal cannula  O2 Flow (L/min): 4    General: appears of appropriate age, lying in bed, NAD  HEENT: NCAT, anicteric sclerae, EOMI, PERRL, moist mucous membranes  Neck: Supple, nontender, no mass  Neurology: A&Ox3, cr. ns. grossly intact, nonfocal, sensation intact   Respiratory: CTA B/L, No W/R/R  CV: RRR, +S1/S2, no murmurs, rubs or gallops  Abdominal: Soft, NT, ND +BS, no palpable masses  Extremities: No cyanosis, no edema, + peripheral pulses  MSK: no joint erythema or warmth, no joint swelling   Heme: No obvious ecchymosis or petechiae   Skin: warm, dry, normal color  Psych: no agitation, normal affect       LABS: reviewed by me             07-08    139  |  105  |  22<H>  ----------------------------<  120<H>  5.7<H>   |  22  |  1.1    Ca    9.1      08 Jul 2024 05:33  Mg     1.7     07-08    TPro  6.6  /  Alb  3.7  /  TBili  0.3  /  DBili  x   /  AST  19  /  ALT  13  /  AlkPhos  144<H>  07-08                            8.4    4.95  )-----------( 171      ( 08 Jul 2024 05:33 )             26.7       RADIOLOGY & ADDITIONAL STUDIES: reviewed by me    EKG: reviewed by me      Patient discussed with primary medical team MD  Palliative care education provided to patient and/or family

## 2024-07-08 NOTE — DISCHARGE NOTE PROVIDER - NSDCFUSCHEDAPPT_GEN_ALL_CORE_FT
Mic Khan  [facility] Physician [facility]  HEART[address]  Scheduled Appointment: 07/15/2024

## 2024-07-08 NOTE — DISCHARGE NOTE PROVIDER - CARE PROVIDER_API CALL
Mic Khan  Cardiovascular Disease  [address] [place] [address], [address] [address]  Phone: [phone]  Fax: [phone]  Scheduled Appointment: 07/15/2024 04:00 PM

## 2024-07-08 NOTE — DISCHARGE NOTE PROVIDER - ATTENDING DISCHARGE PHYSICAL EXAMINATION:
I saw and evaluated the patient the day of discharge.  Asymptomatic, hemodynamically stable, well appearing.  Will be discharged with close cardiology follow up.

## 2024-07-08 NOTE — DISCHARGE NOTE PROVIDER - NSDCCPCAREPLAN_GEN_ALL_CORE_FT
PRINCIPAL DISCHARGE DIAGNOSIS  Diagnosis: Hyponatremia  Assessment and Plan of Treatment: repeat blood work in 2 weeks , follow up with       SECONDARY DISCHARGE DIAGNOSES  Diagnosis: JEMIMA (acute kidney injury)  Assessment and Plan of Treatment:      PRINCIPAL DISCHARGE DIAGNOSIS  Diagnosis: Hyponatremia  Assessment and Plan of Treatment: repeat blood work in 1-2 weeks , follow up with CHF team on 7/15 at 4 PM      SECONDARY DISCHARGE DIAGNOSES  Diagnosis: JEMIMA (acute kidney injury)  Assessment and Plan of Treatment:

## 2024-07-08 NOTE — PROGRESS NOTE ADULT - ASSESSMENT
60 y/o woman w PMHx of HTN, HLD, DM, CAD s/p stent, HFrEF 25%, prior ICD s/p removal due to infection, severe pulm HTN, follows w Cardio NP Cyn Manzano, COPD on 4L NC, CORNELIUS no CPAP, CKD baseline Cr 1.3, CVA w residual L sided weakness and baseline bedbound vs wheelchair reliant, hypothyroidism, presented to ED for 1 week of malaise/fatigue, decreased PO intake and insomnia, in ED found to have WBC 8.27, Na 117, K 3.3, Cr 2.3, lactate 1.2, , given 500cc NS, CXR unremarkable, admitted to medicine stepdown 6/30/24 for hyponatremia of 117 and JEIMMA. Palliative consulted for [facility].     Patient clinically improved, pending discharge.   Will reach out to brother re: advance directives- INCOMPLETE NOTE.     MEDD (morphine equivalent daily dose):    Education about palliative care provided to patient/family.  See Recs below.    Please call [phone] with questions or concerns 24/7.   We will continue to follow.      58 y/o woman w PMHx of HTN, HLD, DM, CAD s/p stent, HFrEF 25%, prior ICD s/p removal due to infection, severe pulm HTN, follows w Cardio NP Cyn Manzano, COPD on 4L NC, CORNELIUS no CPAP, CKD baseline Cr 1.3, CVA w residual L sided weakness and baseline bedbound vs wheelchair reliant, hypothyroidism, presented to ED for 1 week of malaise/fatigue, decreased PO intake and insomnia, in ED found to have WBC 8.27, Na 117, K 3.3, Cr 2.3, lactate 1.2, , given 500cc NS, CXR unremarkable, admitted to medicine stepdown 6/30/24 for hyponatremia of 117 and JEMIMA. Palliative consulted for [facility].     Patient clinically improved, pending discharge.   Spoke with brother and patient re: advance directives (code status). He plans to have a discussion with her about her wishes once she goes home.     MEDD (morphine equivalent daily dose):    Education about palliative care provided to patient/family.  See Recs below.    Please call [phone] with questions or concerns 24/7.   We will continue to follow.

## 2024-07-08 NOTE — CHART NOTE - NSCHARTNOTEFT_GEN_A_CORE
Provider:                                              Met with: [ x  ] Patient  [   ] Family  [   ] Other:         Primary Language: [x   ] English [   ] Other*:                      *Interpretation provided by:         SUPPORT DIAGNOSES            (Check all that apply)         [   ] EOL issues    [  x ] Advanced Illness    [   ] Cultural / spiritual concerns    [   ] Pain / suffering    [   ] Dementia / AMS    [   ] Other:    [   ] AD issues    [   ] Grief / loss / sadness    [   ] Discharge issues    [   ] Distress / coping         PSYCHOSOCIAL ASSESSMENT OF PATIENT         (Check all that apply)         [ X  ] Initial Assessment            [   ] Reassessment          [   ] Not Applicable this visit         Pain/suffering acuity:    [ x  ] None to mild (0-3)           [   ] Moderate (4-6)        [   ] High (7-10)         Mental Status:    [ x  ] Alert/oriented (x3)          [   ] Confused/Altered(x2/x1)         [   ] Non-resp         Functional status:    [   ] Independent w ADLs      [ x  ] Needs Assistance             [   ] Bedbound/Full Care         Coping:    [ x  ] Coping well                     [  ] Coping w/difficulty            [   ] Poor coping         Support system:    [x   ] Strong                              [   ] Adequate                        [   ] Inadequate              Past history and medications for:         [ ] Anxiety       [ ] Depression    [ ] Sleep disorders              SERVICE PROVIDED    [   ]Discharge support / facilitation    [   ]AD / goals of care counseling                                  [   ]EOL / death / bereavement counseling    [ X  ]Counseling / support                                                [   ] Family meeting    [   ]Prayer / sacrament / ritual                                      [   ] Referral    [   ]Other                                                                           NOTE and Plan of Care (PoC):  58 y/o woman w PMHx of HTN, HLD, DM, CAD s/p stent, HFrEF 25%, prior ICD s/p removal due to infection, severe pulm HTN, follows w Cardio NP Cyn Manzano, COPD on 4L NC, CORNELIUS no CPAP, CKD baseline Cr 1.3, CVA w residual L sided weakness and baseline bedbound vs wheelchair reliant, hypothyroidism, presented to ED for 1 week of malaise/fatigue, decreased PO intake and insomnia, in ED found to have WBC 8.27, Na 117, K 3.3, Cr 2.3, lactate 1.2, , given 500cc NS, CXR unremarkable, admitted to medicine stepdown 6/30/24 for hyponatremia of 117 and JEMIMA. Palliative consulted for [facility].   LMSW met with PT at bedside and Palliative Care SW role introduced. PT noted she was comfortable; no reports of pain. She noted she's planned for d/c today and deferred ACP discussion to her brother. See Palliative Care NP note. Questions answered and support rendered. Will follow for support. [phone]

## 2024-07-09 ENCOUNTER — RESULT REVIEW (OUTPATIENT)
Age: 59
End: 2024-07-09

## 2024-07-09 LAB
ANION GAP SERPL CALC-SCNC: 11 MMOL/L — SIGNIFICANT CHANGE UP (ref 7–14)
BUN SERPL-MCNC: 21 MG/DL — HIGH (ref 10–20)
CALCIUM SERPL-MCNC: 9.1 MG/DL — SIGNIFICANT CHANGE UP (ref 8.4–10.5)
CHLORIDE SERPL-SCNC: 105 MMOL/L — SIGNIFICANT CHANGE UP (ref 98–110)
CO2 SERPL-SCNC: 21 MMOL/L — SIGNIFICANT CHANGE UP (ref 17–32)
CREAT SERPL-MCNC: 1.1 MG/DL — SIGNIFICANT CHANGE UP (ref 0.7–1.5)
EGFR: 59 ML/MIN/1.73M2 — LOW
GLUCOSE SERPL-MCNC: 119 MG/DL — HIGH (ref 70–99)
HCT VFR BLD CALC: 26.5 % — LOW (ref 37–47)
HGB BLD-MCNC: 8.2 G/DL — LOW (ref 12–16)
MAGNESIUM SERPL-MCNC: 2 MG/DL — SIGNIFICANT CHANGE UP (ref 1.8–2.4)
MCHC RBC-ENTMCNC: 27.7 PG — SIGNIFICANT CHANGE UP (ref 27–31)
MCHC RBC-ENTMCNC: 30.9 G/DL — LOW (ref 32–37)
MCV RBC AUTO: 89.5 FL — SIGNIFICANT CHANGE UP (ref 81–99)
NRBC # BLD: 0 /100 WBCS — SIGNIFICANT CHANGE UP (ref 0–0)
PLATELET # BLD AUTO: 189 K/UL — SIGNIFICANT CHANGE UP (ref 130–400)
PMV BLD: 10.9 FL — HIGH (ref 7.4–10.4)
POTASSIUM SERPL-MCNC: 5.6 MMOL/L — HIGH (ref 3.5–5)
POTASSIUM SERPL-SCNC: 5.6 MMOL/L — HIGH (ref 3.5–5)
RBC # BLD: 2.96 M/UL — LOW (ref 4.2–5.4)
RBC # FLD: 16.1 % — HIGH (ref 11.5–14.5)
SODIUM SERPL-SCNC: 137 MMOL/L — SIGNIFICANT CHANGE UP (ref 135–146)
WBC # BLD: 4.8 K/UL — SIGNIFICANT CHANGE UP (ref 4.8–10.8)
WBC # FLD AUTO: 4.8 K/UL — SIGNIFICANT CHANGE UP (ref 4.8–10.8)

## 2024-07-09 PROCEDURE — 93308 TTE F-UP OR LMTD: CPT | Mod: 26

## 2024-07-09 PROCEDURE — 99231 SBSQ HOSP IP/OBS SF/LOW 25: CPT

## 2024-07-09 RX ORDER — SODIUM ZIRCONIUM CYCLOSILICATE 10 G/10G
5 POWDER, FOR SUSPENSION ORAL DAILY
Refills: 0 | Status: DISCONTINUED | OUTPATIENT
Start: 2024-07-09 | End: 2024-07-10

## 2024-07-09 RX ORDER — TORSEMIDE 20 MG/1
20 TABLET ORAL DAILY
Refills: 0 | Status: DISCONTINUED | OUTPATIENT
Start: 2024-07-09 | End: 2024-07-11

## 2024-07-09 RX ADMIN — Medication 1 APPLICATION(S): at 21:24

## 2024-07-09 RX ADMIN — DAPAGLIFLOZIN 10 MILLIGRAM(S): 10 TABLET, FILM COATED ORAL at 11:44

## 2024-07-09 RX ADMIN — Medication 1 APPLICATION(S): at 06:53

## 2024-07-09 RX ADMIN — HEPARIN SODIUM 5000 UNIT(S): 50 INJECTION, SOLUTION INTRAVENOUS at 21:24

## 2024-07-09 RX ADMIN — Medication 25 MICROGRAM(S): at 06:53

## 2024-07-09 RX ADMIN — HEPARIN SODIUM 5000 UNIT(S): 50 INJECTION, SOLUTION INTRAVENOUS at 13:07

## 2024-07-09 RX ADMIN — Medication 1 APPLICATION(S): at 13:07

## 2024-07-09 RX ADMIN — CLOPIDOGREL BISULFATE 75 MILLIGRAM(S): 75 TABLET, FILM COATED ORAL at 11:44

## 2024-07-09 RX ADMIN — Medication 25 MILLIGRAM(S): at 06:53

## 2024-07-09 RX ADMIN — SACUBITRIL AND VALSARTAN 0.5 TABLET(S): 97; 103 TABLET, FILM COATED ORAL at 17:54

## 2024-07-09 RX ADMIN — HEPARIN SODIUM 5000 UNIT(S): 50 INJECTION, SOLUTION INTRAVENOUS at 06:54

## 2024-07-09 RX ADMIN — SACUBITRIL AND VALSARTAN 0.5 TABLET(S): 97; 103 TABLET, FILM COATED ORAL at 06:54

## 2024-07-09 RX ADMIN — ATORVASTATIN CALCIUM 80 MILLIGRAM(S): 20 TABLET, FILM COATED ORAL at 21:24

## 2024-07-09 RX ADMIN — TORSEMIDE 20 MILLIGRAM(S): 20 TABLET ORAL at 14:11

## 2024-07-09 RX ADMIN — SODIUM ZIRCONIUM CYCLOSILICATE 5 GRAM(S): 10 POWDER, FOR SUSPENSION ORAL at 14:11

## 2024-07-09 NOTE — PROGRESS NOTE ADULT - SUBJECTIVE AND OBJECTIVE BOX
Chief complaint: Patient is a 59y old  Female who presents with a chief complaint of hyponatremia (06 Jul 2024 07:17)    Interval history:   Doing well  No complaints  NA remains stable  No SOB    Review of systems: A complete 10-point review of systems was obtained and is negative except as stated in the interval history.    Vitals:   ICU Vital Signs Last 24 Hrs  T(C): 36.6 (09 Jul 2024 07:34), Max: 36.7 (08 Jul 2024 15:18)  T(F): 97.8 (09 Jul 2024 07:34), Max: 98 (08 Jul 2024 15:18)  HR: 87 (09 Jul 2024 07:34) (84 - 91)  BP: 106/61 (09 Jul 2024 07:34) (100/60 - 109/79)  BP(mean): 79 (09 Jul 2024 07:34) (75 - 88)  RR: 19 (09 Jul 2024 07:34) (16 - 20)  SpO2: 100% (09 Jul 2024 07:34) (97% - 100%)    O2 Parameters below as of 09 Jul 2024 07:34  Patient On (Oxygen Delivery Method): room air    I&O's Detail    08 Jul 2024 07:01  -  09 Jul 2024 07:00  --------------------------------------------------------  IN:    Oral Fluid: 420 mL  Total IN: 420 mL    OUT:    Voided (mL): 1150 mL  Total OUT: 1150 mL    Total NET: -730 mL      09 Jul 2024 07:01  -  09 Jul 2024 13:49  --------------------------------------------------------  IN:    Oral Fluid: 430 mL  Total IN: 430 mL    OUT:    Voided (mL): 400 mL  Total OUT: 400 mL    Total NET: 30 mL                Physical exam:  General: No apparent distress.  HEENT: Anicteric sclera. Moist mucous membranes.   Cardiac: Regular rate and rhythm. No murmurs, rubs, or gallops.   Vascular: Symmetric radial pulses. Dorsalis pedis pulses palpable.   Respiratory: Normal effort. Clear to auscultation.   Abdomen: Soft, nontender. Audible bowel sounds.   Extremities: Warm with no edema. No cyanosis or clubbing.   Skin: Warm and dry. No rash.   Neurologic: Grossly normal motor function.   Psychiatric: Oriented to person, place, and time.     Data reviewed:  - Telemetry: SR with 1st degree AV block at 68-93 bpm    - ECG 6/30/24: NSR w/ 1st degree AV block     - TTE:   11/11/23: LV EF 25-30%. Moderate (grade II) diastolic dysfunction. Severely enlarged right ventricle (RVEDD = 5.3cm) with low-normal function. Moderately enlarged right atrium. Mildly enlarged left atrium. Moderate-severe tricuspid regurgitation with hepatic vein flow reversal. Mild aortic valve stenosis (Vmax 2.4m/s, mean PG 12mmHg, SHILPI 1.79cm2). Mild aortic regurgitation. The mitral valve leaflets are tethered with trace regurgitation. Severe pulmonary hypertension (PASP is at least 60mmHg). The IVC is dilated (2.8cm) with <50% collapsibility indicating increased right atrial pressure. PASP may be underestimated due to TR severity. Small pericardial effusion without echocardiographic signs of tamponade physiology.    - Chest x-ray:   6/30/24: Cardiomegaly, unchanged. No acute infiltrates.    - Stress test:   6/16/21:   1. MODERATE REVERSIBLE DEFECT IN THE SEPTUM, ANTERIOR AND INFERIOR WALL OF THE LEFT VENTRICLE CONSISTENT WITH ISCHEMIA.  2. DILATED LEFT VENTRICLE WITH MARKED GLOBAL HYPOKINESIS. THERE IS INCOMPLETE THICKENING OF THE SEPTUM ANTERIOR AND INFERIOR WALL OF THE LEFT VENTRICLE RAISING QUESTION OF HIBERNATING MYOCARDIUM. THE LATERAL WALL OF THE LEFT VENTRICLE THICKENS NORMALLY.  3. LEFT VENTRICULAR EJECTION FRACTION OF <20 % WHICH IS VERY LOW. ECHOCARDIOGRAPHIC ESTIMATED EJECTION FRACTION 6/14/2021 WAS 35 TO 40%.    - Cardiac catheterization:    RCA disease - RICARDO x 1      - Labs:                                   8.2    4.80  )-----------( 189      ( 09 Jul 2024 04:46 )             26.5   07-09    137  |  105  |  21<H>  ----------------------------<  119<H>  5.6<H>   |  21  |  1.1    Ca    9.1      09 Jul 2024 04:46  Mg     2.0     07-09    TPro  6.6  /  Alb  3.7  /  TBili  0.3  /  DBili  x   /  AST  19  /  ALT  13  /  AlkPhos  144<H>  07-08          Medications:  MEDICATIONS  (STANDING):  atorvastatin 80 milliGRAM(s) Oral at bedtime  calamine/zinc oxide Lotion 1 Application(s) Topical three times a day  chlorhexidine 2% Cloths 1 Application(s) Topical <User Schedule>  clopidogrel Tablet 75 milliGRAM(s) Oral daily  dapagliflozin 10 milliGRAM(s) Oral daily  heparin   Injectable 5000 Unit(s) SubCutaneous every 8 hours  levothyroxine 25 MICROGram(s) Oral daily  metoprolol succinate ER 25 milliGRAM(s) Oral daily  sacubitril 24 mG/valsartan 26 mG 0.5 Tablet(s) Oral two times a day  sodium chloride 0.9%. 250 milliLiter(s) (75 mL/Hr) IV Continuous <Continuous>    MEDICATIONS  (PRN):  acetaminophen     Tablet .. 650 milliGRAM(s) Oral every 6 hours PRN Mild Pain (1 - 3), Moderate Pain (4 - 6)  melatonin 3 milliGRAM(s) Oral at bedtime PRN Insomnia

## 2024-07-09 NOTE — PROGRESS NOTE ADULT - ASSESSMENT
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  Patient was given 500 cc bolus NS and admitted for hyponatremia and JEMIMA. Pt coming from stepdown 4T to 4T cardiac telemetry to restart home GDMT meds.     Problems discussed and associated plan:  # Severe Pulm HTN  - Home meds: Entresto, Spironolactone, Dapagliflozin, Torsemide  - Continue to hold: Spironolactone and Torsemide    - Continue Toprol 25mg daily   - 7/5 restarted 1/2 dose Entresto 24/26mg  - 7/5 restarted Farxiga 10mg   - No Aldactone 2/2 Hyper k  - Resume torsemide   - Continue Atorvastatin 80 mg daily   -F/U Repeat Echo  -DC home in AM    # JEMIMA -  Stable and improved    - Reintroducing GDMT    # Hypovolemic Shock   - resolved      # Hyponatremia secondary to poor po intake and diuretics  - resolved     # Hyperkalemia  -Lokelma daily    # CAD s/p stent HFrEF 25%/ prior ICD  - Continue Plavix 75 mg daily  - Continue Toprol XL 25 mg daily   - Continue Atorvastatin 80 mg daily     # UTI  - s/p IV abx     # Hypothyroidism  - c/w synthroid 25mcg daily     # Diffuse Skin Rash  - Continue wound care as per Derm    #Misc  -PT consult   - consult     GI prophylaxis: None  DVT prophylaxis: Heparin  Full code.      Please contact me with any questions or concerns at [phone].

## 2024-07-10 ENCOUNTER — TRANSCRIPTION ENCOUNTER (OUTPATIENT)
Age: 59
End: 2024-07-10

## 2024-07-10 LAB
ANION GAP SERPL CALC-SCNC: 13 MMOL/L — SIGNIFICANT CHANGE UP (ref 7–14)
BUN SERPL-MCNC: 22 MG/DL — HIGH (ref 10–20)
CALCIUM SERPL-MCNC: 9.6 MG/DL — SIGNIFICANT CHANGE UP (ref 8.4–10.4)
CHLORIDE SERPL-SCNC: 103 MMOL/L — SIGNIFICANT CHANGE UP (ref 98–110)
CO2 SERPL-SCNC: 23 MMOL/L — SIGNIFICANT CHANGE UP (ref 17–32)
CORTICOSTEROID BINDING GLOBULIN RESULT: 2.4 MG/DL — SIGNIFICANT CHANGE UP
CORTIS F/TOTAL MFR SERPL: 8.8 % — SIGNIFICANT CHANGE UP
CORTIS SERPL-MCNC: 14 UG/DL — SIGNIFICANT CHANGE UP
CORTISOL, FREE RESULT: 1.2 UG/DL — SIGNIFICANT CHANGE UP
CREAT SERPL-MCNC: 1.2 MG/DL — SIGNIFICANT CHANGE UP (ref 0.7–1.5)
EGFR: 53 ML/MIN/1.73M2 — LOW
GLUCOSE SERPL-MCNC: 125 MG/DL — HIGH (ref 70–99)
HCT VFR BLD CALC: 28 % — LOW (ref 37–47)
HGB BLD-MCNC: 8.5 G/DL — LOW (ref 12–16)
MAGNESIUM SERPL-MCNC: 1.6 MG/DL — LOW (ref 1.8–2.4)
MCHC RBC-ENTMCNC: 27.3 PG — SIGNIFICANT CHANGE UP (ref 27–31)
MCHC RBC-ENTMCNC: 30.4 G/DL — LOW (ref 32–37)
MCV RBC AUTO: 90 FL — SIGNIFICANT CHANGE UP (ref 81–99)
NRBC # BLD: 0 /100 WBCS — SIGNIFICANT CHANGE UP (ref 0–0)
PLATELET # BLD AUTO: 209 K/UL — SIGNIFICANT CHANGE UP (ref 130–400)
PMV BLD: 10.4 FL — SIGNIFICANT CHANGE UP (ref 7.4–10.4)
POTASSIUM SERPL-MCNC: 5.5 MMOL/L — HIGH (ref 3.5–5)
POTASSIUM SERPL-SCNC: 5.5 MMOL/L — HIGH (ref 3.5–5)
RBC # BLD: 3.11 M/UL — LOW (ref 4.2–5.4)
RBC # FLD: 16.9 % — HIGH (ref 11.5–14.5)
SODIUM SERPL-SCNC: 139 MMOL/L — SIGNIFICANT CHANGE UP (ref 135–146)
WBC # BLD: 5.42 K/UL — SIGNIFICANT CHANGE UP (ref 4.8–10.8)
WBC # FLD AUTO: 5.42 K/UL — SIGNIFICANT CHANGE UP (ref 4.8–10.8)

## 2024-07-10 PROCEDURE — 99231 SBSQ HOSP IP/OBS SF/LOW 25: CPT

## 2024-07-10 RX ORDER — SODIUM ZIRCONIUM CYCLOSILICATE 10 G/10G
10 POWDER, FOR SUSPENSION ORAL ONCE
Refills: 0 | Status: COMPLETED | OUTPATIENT
Start: 2024-07-10 | End: 2024-07-10

## 2024-07-10 RX ORDER — LEVOTHYROXINE SODIUM 25 MCG
1 TABLET ORAL
Qty: 0 | Refills: 0 | DISCHARGE
Start: 2024-07-10

## 2024-07-10 RX ORDER — MAGNESIUM OXIDE 400 MG/1
1 TABLET ORAL
Qty: 4 | Refills: 0
Start: 2024-07-10 | End: 2024-07-11

## 2024-07-10 RX ORDER — SODIUM ZIRCONIUM CYCLOSILICATE 10 G/10G
10 POWDER, FOR SUSPENSION ORAL
Qty: 300 | Refills: 0
Start: 2024-07-10 | End: 2024-08-08

## 2024-07-10 RX ORDER — SODIUM ZIRCONIUM CYCLOSILICATE 10 G/10G
5 POWDER, FOR SUSPENSION ORAL ONCE
Refills: 0 | Status: DISCONTINUED | OUTPATIENT
Start: 2024-07-10 | End: 2024-07-10

## 2024-07-10 RX ORDER — TORSEMIDE 20 MG/1
1 TABLET ORAL
Qty: 0 | Refills: 0 | DISCHARGE
Start: 2024-07-10

## 2024-07-10 RX ORDER — ATORVASTATIN CALCIUM 20 MG/1
1 TABLET, FILM COATED ORAL
Qty: 30 | Refills: 0
Start: 2024-07-10 | End: 2024-08-08

## 2024-07-10 RX ORDER — MAGNESIUM OXIDE 400 MG/1
400 TABLET ORAL ONCE
Refills: 0 | Status: COMPLETED | OUTPATIENT
Start: 2024-07-10 | End: 2024-07-10

## 2024-07-10 RX ORDER — MAGNESIUM OXIDE 400 MG/1
400 TABLET ORAL
Refills: 0 | Status: COMPLETED | OUTPATIENT
Start: 2024-07-10 | End: 2024-07-11

## 2024-07-10 RX ORDER — MAGNESIUM SULFATE 100 %
1 POWDER (GRAM) MISCELLANEOUS ONCE
Refills: 0 | Status: DISCONTINUED | OUTPATIENT
Start: 2024-07-10 | End: 2024-07-10

## 2024-07-10 RX ORDER — TORSEMIDE 20 MG/1
1 TABLET ORAL
Refills: 0 | DISCHARGE

## 2024-07-10 RX ORDER — MAGNESIUM OXIDE 400 MG/1
400 TABLET ORAL ONCE
Refills: 0 | Status: DISCONTINUED | OUTPATIENT
Start: 2024-07-10 | End: 2024-07-10

## 2024-07-10 RX ORDER — SODIUM ZIRCONIUM CYCLOSILICATE 10 G/10G
10 POWDER, FOR SUSPENSION ORAL DAILY
Refills: 0 | Status: DISCONTINUED | OUTPATIENT
Start: 2024-07-11 | End: 2024-07-11

## 2024-07-10 RX ORDER — SACUBITRIL AND VALSARTAN 97; 103 MG/1; MG/1
0.5 TABLET, FILM COATED ORAL
Qty: 30 | Refills: 0
Start: 2024-07-10 | End: 2024-08-08

## 2024-07-10 RX ORDER — MAGNESIUM OXIDE 400 MG/1
400 TABLET ORAL
Refills: 0 | Status: DISCONTINUED | OUTPATIENT
Start: 2024-07-10 | End: 2024-07-10

## 2024-07-10 RX ADMIN — SODIUM ZIRCONIUM CYCLOSILICATE 10 GRAM(S): 10 POWDER, FOR SUSPENSION ORAL at 08:44

## 2024-07-10 RX ADMIN — Medication 1 APPLICATION(S): at 13:22

## 2024-07-10 RX ADMIN — MAGNESIUM OXIDE 400 MILLIGRAM(S): 400 TABLET ORAL at 11:18

## 2024-07-10 RX ADMIN — Medication 25 MICROGRAM(S): at 05:11

## 2024-07-10 RX ADMIN — SACUBITRIL AND VALSARTAN 0.5 TABLET(S): 97; 103 TABLET, FILM COATED ORAL at 05:12

## 2024-07-10 RX ADMIN — MAGNESIUM OXIDE 400 MILLIGRAM(S): 400 TABLET ORAL at 17:27

## 2024-07-10 RX ADMIN — ATORVASTATIN CALCIUM 80 MILLIGRAM(S): 20 TABLET, FILM COATED ORAL at 21:13

## 2024-07-10 RX ADMIN — CLOPIDOGREL BISULFATE 75 MILLIGRAM(S): 75 TABLET, FILM COATED ORAL at 11:19

## 2024-07-10 RX ADMIN — MAGNESIUM OXIDE 400 MILLIGRAM(S): 400 TABLET ORAL at 13:24

## 2024-07-10 RX ADMIN — Medication 1 APPLICATION(S): at 21:13

## 2024-07-10 RX ADMIN — Medication 1 APPLICATION(S): at 05:11

## 2024-07-10 RX ADMIN — HEPARIN SODIUM 5000 UNIT(S): 50 INJECTION, SOLUTION INTRAVENOUS at 21:13

## 2024-07-10 RX ADMIN — Medication 25 MILLIGRAM(S): at 05:11

## 2024-07-10 RX ADMIN — HEPARIN SODIUM 5000 UNIT(S): 50 INJECTION, SOLUTION INTRAVENOUS at 13:23

## 2024-07-10 RX ADMIN — DAPAGLIFLOZIN 10 MILLIGRAM(S): 10 TABLET, FILM COATED ORAL at 11:19

## 2024-07-10 RX ADMIN — HEPARIN SODIUM 5000 UNIT(S): 50 INJECTION, SOLUTION INTRAVENOUS at 05:11

## 2024-07-10 RX ADMIN — TORSEMIDE 20 MILLIGRAM(S): 20 TABLET ORAL at 05:11

## 2024-07-10 NOTE — DISCHARGE NOTE NURSING/CASE MANAGEMENT/SOCIAL WORK - PATIENT PORTAL LINK FT
You can access the FollowMyHealth Patient Portal offered by [facility] by registering at the following website: [email or web address]. By joining [facility]’s FollowMyHealth portal, you will also be able to view your health information using other applications (apps) compatible with our system.

## 2024-07-10 NOTE — PROGRESS NOTE ADULT - TIME BILLING
Chart review, bedside evaluation, discussion of plan with the patient
Time spent on total encounter assessing patient, examination, chart review, counseling and coordinating care by the attending physician/nurse/care manager as well as GOC discussion.
Chart review, bedside evaluation, discussion of plan with the patient
Chart review, bedside evaluation, discussion of plan with the patient

## 2024-07-10 NOTE — PROGRESS NOTE ADULT - ASSESSMENT
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  Patient was given 500 cc bolus NS and admitted for hyponatremia and JEMIMA. Pt coming from stepdown 4T to 4T cardiac telemetry to restart home GDMT meds.     Problems discussed and associated plan:  # Severe Pulm HTN  - Home meds: Entresto, Spironolactone, Dapagliflozin, Torsemide  - Continue to hold: Spironolactone   - Torsemide 20 mg daily   - Continue Toprol 25mg daily   - 7/5 restarted 1/2 dose Entresto 24/26mg  - 7/5 restarted Farxiga 10mg   - No Aldactone 2/2 Hyper k  - Continue Atorvastatin 80 mg daily   - TTE 7/9 EF 30-35%, mod-severe MR   - Mg 1.6, replete with oral Mg, will send home with 2 days rx   -increased lokelma to 10 g daily , dc on it     # JEMIMA -  Stable and improved    # Hypovolemic Shock   - resolved      # Hyponatremia secondary to poor po intake and diuretics  - resolved     # Hyperkalemia  -Lokelma daily    # CAD s/p stent HFrEF 25%/ prior ICD  - Continue Plavix 75 mg daily  - Continue Toprol XL 25 mg daily   - Continue Atorvastatin 80 mg daily     # UTI  - s/p IV abx     # Hypothyroidism  - c/w synthroid 25mcg daily     # Diffuse Skin Rash  - Continue wound care as per Derm      GI prophylaxis: None  DVT prophylaxis: Heparin  Full code.    -DC home  7/11 10 AM when aide is available   Please contact me with any questions or concerns at [phone].

## 2024-07-10 NOTE — PROGRESS NOTE ADULT - NS ATTEND AMEND GEN_ALL_CORE FT
Resting comfortably.  Appetite better.  Hemodynamics / renal function / sodium stable.    IMPRESSION:  1. CAD s/p PCI (stable)    2. ICM (severe LV dysfunction)    3. Chronic Systolic CHF (relatively compensated)    4. Acute on chronic hyponatremia and JEMIMA on CKD (secondary to poor PO and Rx)  - Improved    PLAN:  Resume low-dose Toprol  Continue Torsemide to maintain euvolemic  Plan to gradually reintroduce GDMT
In brief, 59F with HFrEF 2/2 ICM, severe pHTN (likely group II/III), CKD3, prior CVA with residual LLE weakness (wheelchair dependent at baseline) who presented with decreased PO intake and weakness and was found to have severe hyponatremia along with JEMIMA on CKD. Course complicaed by hypovolemic shock which has resolved. GDMT is being re-introduced and she is grossly compensated on exam.    Plan:  - Continue BB, entresto, and Farxiga  - Spironolactone held due to persistent hyperkalemia; will start lokelma  - Physical therapy  - Discharge planning
Resting.    Postponed resuming spironolactone (mild JEMIMA and hyperkalemia).  Hemodynamics stable.    IMPRESSION:  1. CAD s/p PCI (stable)    2. ICM (severe LV dysfunction)    3. Chronic Systolic CHF (relatively compensated)    4. JEMIMA (likely prerenal)    5. Acute on chronic hyponatremia (improved / stable)    PLAN:  250cc NS  Continue Toprol  Continue Entresto  Continue Farxiga  OOB / PT  Discharge planning (resides at home with aid)
In brief, 59F with HFrEF 2/2 ICM, severe pHTN (likely group II/III), CKD3, prior CVA with residual LLE weakness (wheelchair dependent at baseline) who presented with decreased PO intake and weakness and was found to have severe hyponatremia along with JEMIMA on CKD. Course complicaed by hypovolemic shock which has resolved. GDMT is being re-introduced and she is grossly compensated on exam.    Plan:  - Continue BB, entresto, and Farxiga  - Spironolactone held due to persistent hyperkalemia; continue lokelma  - Can hold Entresto for SBP <90  - Physical therapy  - Discharge planning
59yoF with HFrEF with LVEF 25%, prior ICD which was removed in s/o infection, severe pHTN, CAD s/p PCI, HTN, DM, CKD (baseline Cr 1.3), CORNELIUS (not on CPAP), COPD on 4L nc, and history of CVA who presented with hyponatremia and prerenal JEMIMA in the setting of 10 days of poor PO intake while still taking her Entresto, spironolactone, Farxiga, and torsemide. Patient was treated with IVF, with improvement of Na and Cr, now at baseline. She was downgraded to Cardiac Tele for slow reintroduction of her GDMT. Patient states her appetite has returned to normal. Metoprolol was resumed yesterday, will restart lower dose Entresto and Farxiga to day.     Plan:   - Holding torsemide (home dose was 20 mg daily, which was increased to 40 mg at last appt)  - Continue Toprol 25 mg daily  - Start Entresto 12/13 mg BID and Farxiga 10 mg daily  - Will consider spironolactone tomorrow  - IVC 7/05/24: 1.8 cm and collapsing  - TSH wnl  - AM cortisol pending
In brief, 59F with HFrEF 2/2 ICM, severe pHTN (likely group II/III), CKD3, prior CVA with residual LLE weakness (wheelchair dependent at baseline) who presented with decreased PO intake and weakness and was found to have severe hyponatremia along with JEMIMA on CKD. Course complicaed by hypovolemic shock which has resolved. GDMT is being re-introduced and she is grossly compensated on exam.    Plan:  - Continue BB, entresto, and Farxiga  - Spironolactone held due to persistent hyperkalemia; continue lokelma  - Physical therapy  - Discharge planning
59yoF with HFrEF with LVEF 25%, prior ICD which was removed in s/o infection, severe pHTN, CAD s/p PCI, HTN, DM, CKD (baseline Cr 1.3), CORNELIUS (not on CPAP), COPD on 4L nc, and history of CVA who presented with hyponatremia and prerenal JEMIMA in the setting of 10 days of poor PO intake while still taking her Entresto, spironolactone, Farxiga, and torsemide. Patient was treated with IVF, with improvement of Na and Cr, now at baseline. She was downgraded to Cardiac Tele for slow reintroduction of her GDMT. Patient states her appetite has returned to normal.     Plan:   - Continue torsemide 10 mg PO daily (home dose was 20 mg daily, which was increased to 40 mg at last appt)  - Holding Entresto, spironolactone, and Farxiga  - Will check IVC  - If IVC with RAP > 3 mmHg, will start 1/2 tablet of Entresto 24/26 mg BID  - If IVC with RAP 3 mmHg, will continue to hold home meds  - TSH wnl  - AM cortisol pending  - PT consulted
Resting.  Tolerating reinstitution of GDMT.  Hemodynamics / renal function / sodium stable.    IMPRESSION:  1. CAD s/p PCI (stable)    2. ICM (severe LV dysfunction)    3. Chronic Systolic CHF (relatively compensated)    4. Acute on chronic hyponatremia and JEMIMA on CKD (secondary to poor PO and Rx)  - Improved    PLAN:  Continue Toprol  Continue Entresto  Continue Farxiga  Resume Spironolactone 12.5mg q24  OOB / PT  Discharge planning (resides at home with aid)

## 2024-07-10 NOTE — PROGRESS NOTE ADULT - REASON FOR ADMISSION
hyponatremia
hyponatremia and JEMIMA
hyponatremia

## 2024-07-10 NOTE — PROGRESS NOTE ADULT - SUBJECTIVE AND OBJECTIVE BOX
Chief complaint: Patient is a 59y old  Female who presents with a chief complaint of hyponatremia (06 Jul 2024 07:17)    Interval history:   denies any complaints, denies SOB, chest pain, lightheadedness, dizziness   BP 83/51 (MAP 62) afternoon retook in 30 minutes on the same arm (L side) 88/52 (MAP 64), pt asymptomatic     Review of systems: A complete 10-point review of systems was obtained and is negative except as stated in the interval history.      I&O's Summary    09 Jul 2024 07:01  -  10 Jul 2024 07:00  --------------------------------------------------------  IN: 430 mL / OUT: 2200 mL / NET: -1770 mL    10 Jul 2024 07:01  -  10 Jul 2024 15:12  --------------------------------------------------------  IN: 450 mL / OUT: 350 mL / NET: 100 mL            Physical exam:  General: No apparent distress.  HEENT: Anicteric sclera. Moist mucous membranes.   Cardiac: Regular rate and rhythm. No murmurs, rubs, or gallops.   Vascular: Symmetric radial pulses. Dorsalis pedis pulses palpable.   Respiratory: Normal effort. Clear to auscultation.   Abdomen: Soft, nontender. Audible bowel sounds.   Extremities: Warm with no edema. No cyanosis or clubbing.   Skin: Warm and dry. No rash.   Neurologic: Grossly normal motor function.   Psychiatric: Oriented to person, place, and time.     Data reviewed:  - Telemetry: SR with 1st degree AV block at 68-93 bpm    - ECG 6/30/24: NSR w/ 1st degree AV block     - TTE:   11/11/23: LV EF 25-30%. Moderate (grade II) diastolic dysfunction. Severely enlarged right ventricle (RVEDD = 5.3cm) with low-normal function. Moderately enlarged right atrium. Mildly enlarged left atrium. Moderate-severe tricuspid regurgitation with hepatic vein flow reversal. Mild aortic valve stenosis (Vmax 2.4m/s, mean PG 12mmHg, SHILPI 1.79cm2). Mild aortic regurgitation. The mitral valve leaflets are tethered with trace regurgitation. Severe pulmonary hypertension (PASP is at least 60mmHg). The IVC is dilated (2.8cm) with <50% collapsibility indicating increased right atrial pressure. PASP may be underestimated due to TR severity. Small pericardial effusion without echocardiographic signs of tamponade physiology.    - Chest x-ray:   6/30/24: Cardiomegaly, unchanged. No acute infiltrates.    - Stress test:   6/16/21:   1. MODERATE REVERSIBLE DEFECT IN THE SEPTUM, ANTERIOR AND INFERIOR WALL OF THE LEFT VENTRICLE CONSISTENT WITH ISCHEMIA.  2. DILATED LEFT VENTRICLE WITH MARKED GLOBAL HYPOKINESIS. THERE IS INCOMPLETE THICKENING OF THE SEPTUM ANTERIOR AND INFERIOR WALL OF THE LEFT VENTRICLE RAISING QUESTION OF HIBERNATING MYOCARDIUM. THE LATERAL WALL OF THE LEFT VENTRICLE THICKENS NORMALLY.  3. LEFT VENTRICULAR EJECTION FRACTION OF <20 % WHICH IS VERY LOW. ECHOCARDIOGRAPHIC ESTIMATED EJECTION FRACTION 6/14/2021 WAS 35 TO 40%.    - Cardiac catheterization:    RCA disease - RICARDO x 1        - Labs:                        8.5    5.42  )-----------( 209      ( 10 Jul 2024 05:35 )             28.0     07-10    139  |  103  |  22<H>  ----------------------------<  125<H>  5.5<H>   |  23  |  1.2    Ca    9.6      10 Jul 2024 05:35  Mg     1.6     07-10                  Lactate Trend    Urinalysis Basic - ( 10 Jul 2024 05:35 )    Color: x / Appearance: x / SG: x / pH: x  Gluc: 125 mg/dL / Ketone: x  / Bili: x / Urobili: x   Blood: x / Protein: x / Nitrite: x   Leuk Esterase: x / RBC: x / WBC x   Sq Epi: x / Non Sq Epi: x / Bacteria: x              Medications:  MEDICATIONS  (STANDING):  atorvastatin 80 milliGRAM(s) Oral at bedtime  calamine/zinc oxide Lotion 1 Application(s) Topical three times a day  chlorhexidine 2% Cloths 1 Application(s) Topical <User Schedule>  clopidogrel Tablet 75 milliGRAM(s) Oral daily  dapagliflozin 10 milliGRAM(s) Oral daily  heparin   Injectable 5000 Unit(s) SubCutaneous every 8 hours  levothyroxine 25 MICROGram(s) Oral daily  magnesium oxide 400 milliGRAM(s) Oral two times a day with meals  metoprolol succinate ER 25 milliGRAM(s) Oral daily  sacubitril 24 mG/valsartan 26 mG 0.5 Tablet(s) Oral two times a day  torsemide 20 milliGRAM(s) Oral daily    MEDICATIONS  (PRN):  acetaminophen     Tablet .. 650 milliGRAM(s) Oral every 6 hours PRN Mild Pain (1 - 3), Moderate Pain (4 - 6)  melatonin 3 milliGRAM(s) Oral at bedtime PRN Insomnia

## 2024-07-10 NOTE — PROGRESS NOTE ADULT - PROVIDER SPECIALTY LIST ADULT
Cardiology
Hospitalist
Cardiology
Nephrology
Palliative Care
Cardiology
Palliative Care

## 2024-07-10 NOTE — DISCHARGE NOTE NURSING/CASE MANAGEMENT/SOCIAL WORK - NSDCPEFALRISK_GEN_ALL_CORE
For information on Fall & Injury Prevention, visit: [email or web address] OR  [email or web address] OR  https://www.cdc.gov/steadi/patient.html

## 2024-07-10 NOTE — PROGRESS NOTE ADULT - NS ATTEND OPT1 GEN_ALL_CORE

## 2024-07-11 VITALS
DIASTOLIC BLOOD PRESSURE: 59 MMHG | RESPIRATION RATE: 20 BRPM | SYSTOLIC BLOOD PRESSURE: 115 MMHG | OXYGEN SATURATION: 95 % | HEART RATE: 87 BPM

## 2024-07-11 PROCEDURE — 99238 HOSP IP/OBS DSCHRG MGMT 30/<: CPT

## 2024-07-11 RX ADMIN — HEPARIN SODIUM 5000 UNIT(S): 50 INJECTION, SOLUTION INTRAVENOUS at 05:19

## 2024-07-11 RX ADMIN — TORSEMIDE 20 MILLIGRAM(S): 20 TABLET ORAL at 08:59

## 2024-07-11 RX ADMIN — MAGNESIUM OXIDE 400 MILLIGRAM(S): 400 TABLET ORAL at 08:58

## 2024-07-11 RX ADMIN — Medication 1 APPLICATION(S): at 05:18

## 2024-07-11 RX ADMIN — Medication 25 MICROGRAM(S): at 05:19

## 2024-07-11 RX ADMIN — Medication 25 MILLIGRAM(S): at 08:58

## 2024-07-13 DIAGNOSIS — N30.90 CYSTITIS, UNSPECIFIED WITHOUT HEMATURIA: ICD-10-CM

## 2024-07-13 DIAGNOSIS — E11.22 TYPE 2 DIABETES MELLITUS WITH DIABETIC CHRONIC KIDNEY DISEASE: ICD-10-CM

## 2024-07-13 DIAGNOSIS — D63.1 ANEMIA IN CHRONIC KIDNEY DISEASE: ICD-10-CM

## 2024-07-13 DIAGNOSIS — G47.33 OBSTRUCTIVE SLEEP APNEA (ADULT) (PEDIATRIC): ICD-10-CM

## 2024-07-13 DIAGNOSIS — Z87.891 PERSONAL HISTORY OF NICOTINE DEPENDENCE: ICD-10-CM

## 2024-07-13 DIAGNOSIS — I13.0 HYPERTENSIVE HEART AND CHRONIC KIDNEY DISEASE WITH HEART FAILURE AND STAGE 1 THROUGH STAGE 4 CHRONIC KIDNEY DISEASE, OR UNSPECIFIED CHRONIC KIDNEY DISEASE: ICD-10-CM

## 2024-07-13 DIAGNOSIS — I27.22 PULMONARY HYPERTENSION DUE TO LEFT HEART DISEASE: ICD-10-CM

## 2024-07-13 DIAGNOSIS — Z95.5 PRESENCE OF CORONARY ANGIOPLASTY IMPLANT AND GRAFT: ICD-10-CM

## 2024-07-13 DIAGNOSIS — E03.9 HYPOTHYROIDISM, UNSPECIFIED: ICD-10-CM

## 2024-07-13 DIAGNOSIS — Z99.81 DEPENDENCE ON SUPPLEMENTAL OXYGEN: ICD-10-CM

## 2024-07-13 DIAGNOSIS — I25.5 ISCHEMIC CARDIOMYOPATHY: ICD-10-CM

## 2024-07-13 DIAGNOSIS — J96.11 CHRONIC RESPIRATORY FAILURE WITH HYPOXIA: ICD-10-CM

## 2024-07-13 DIAGNOSIS — J44.9 CHRONIC OBSTRUCTIVE PULMONARY DISEASE, UNSPECIFIED: ICD-10-CM

## 2024-07-13 DIAGNOSIS — E87.5 HYPERKALEMIA: ICD-10-CM

## 2024-07-13 DIAGNOSIS — I27.23 PULMONARY HYPERTENSION DUE TO LUNG DISEASES AND HYPOXIA: ICD-10-CM

## 2024-07-13 DIAGNOSIS — N17.9 ACUTE KIDNEY FAILURE, UNSPECIFIED: ICD-10-CM

## 2024-07-13 DIAGNOSIS — Z79.84 LONG TERM (CURRENT) USE OF ORAL HYPOGLYCEMIC DRUGS: ICD-10-CM

## 2024-07-13 DIAGNOSIS — E83.42 HYPOMAGNESEMIA: ICD-10-CM

## 2024-07-13 DIAGNOSIS — I69.354 HEMIPLEGIA AND HEMIPARESIS FOLLOWING CEREBRAL INFARCTION AFFECTING LEFT NON-DOMINANT SIDE: ICD-10-CM

## 2024-07-13 DIAGNOSIS — E78.5 HYPERLIPIDEMIA, UNSPECIFIED: ICD-10-CM

## 2024-07-13 DIAGNOSIS — I50.22 CHRONIC SYSTOLIC (CONGESTIVE) HEART FAILURE: ICD-10-CM

## 2024-07-13 DIAGNOSIS — R57.1 HYPOVOLEMIC SHOCK: ICD-10-CM

## 2024-07-13 DIAGNOSIS — Z79.02 LONG TERM (CURRENT) USE OF ANTITHROMBOTICS/ANTIPLATELETS: ICD-10-CM

## 2024-07-13 DIAGNOSIS — E87.1 HYPO-OSMOLALITY AND HYPONATREMIA: ICD-10-CM

## 2024-07-13 DIAGNOSIS — E87.6 HYPOKALEMIA: ICD-10-CM

## 2024-07-13 DIAGNOSIS — I34.0 NONRHEUMATIC MITRAL (VALVE) INSUFFICIENCY: ICD-10-CM

## 2024-07-13 DIAGNOSIS — I25.10 ATHEROSCLEROTIC HEART DISEASE OF NATIVE CORONARY ARTERY WITHOUT ANGINA PECTORIS: ICD-10-CM

## 2024-07-13 DIAGNOSIS — N18.31 CHRONIC KIDNEY DISEASE, STAGE 3A: ICD-10-CM

## 2024-07-13 DIAGNOSIS — Z79.890 HORMONE REPLACEMENT THERAPY: ICD-10-CM

## 2024-07-13 DIAGNOSIS — R21 RASH AND OTHER NONSPECIFIC SKIN ERUPTION: ICD-10-CM

## 2024-07-13 DIAGNOSIS — Z99.3 DEPENDENCE ON WHEELCHAIR: ICD-10-CM

## 2024-07-13 DIAGNOSIS — R63.0 ANOREXIA: ICD-10-CM

## 2024-07-15 ENCOUNTER — APPOINTMENT (OUTPATIENT)
Dept: HEART FAILURE | Facility: CLINIC | Age: 59
End: 2024-07-15

## 2024-07-30 NOTE — PATIENT PROFILE ADULT - OVER THE PAST TWO WEEKS, HAVE YOU FELT LITTLE INTEREST OR PLEASURE IN DOING THINGS?
Continue present medications she liked BREZTRI but insurance wouldn't cover  Will refill medications as needed.  Instructed patient to contact us with any issues concerning their medications (cost, reactions, etc.).  Have discussed with patient about inciting conditions which may exacerbate their disease.  We did discuss possible new therapies or de-escalation of therapy (if appropriate).  Asked patient if they were interested in pursuing pulmonary rehabilitation.  All questions answered  Patient instructed that they are to call if symptoms change or new issues develop prior to their next visit.  RTC 6 month     no

## 2024-08-22 NOTE — PHYSICAL THERAPY INITIAL EVALUATION ADULT - PRECAUTIONS/LIMITATIONS, REHAB EVAL
[de-identified] : Last Visit: Aug 08, 2024  Reason: LEFT knee arthroscopy with CARLOS and JOHN Pain level: 2/10 Symptoms: feels like it gets stuck  Physical therapy/Home exercises:   going to physical therapy 1 a  week
fall precautions

## 2024-09-17 NOTE — PHYSICAL THERAPY INITIAL EVALUATION ADULT - BALANCE DISTURBANCE, IDENTIFIED IMPAIRMENT CONTRIBUTE, REHAB EVAL
Airway         Procedure Start/Stop Times: 9/16/2024 9:05 PM and 9/16/2024 9:05 PM  Staff -        Anesthesiologist:  Roseanne Blackburn MD       CRNA: Najma Christian APRN CRNA       Performed By: CRNAIndications and Patient Condition       Indications for airway management: respiratory insufficiency and altered level of consciousness       Induction type:intravenous       Mask difficulty assessment: 1 - vent by mask    Final Airway Details       Final airway type: endotracheal airway       Successful airway: ETT - single  Endotracheal Airway Details        ETT size (mm): 7.0       Cuffed: yes       Successful intubation technique: video laryngoscopy       VL Blade Size: Glidescope 3       Grade View of Cords: 1       Position: Center       Measured from: gums/teeth       Secured at (cm): 22       Bite Block used: bite block within the tube lynn mechanism.    Post intubation assessment        Placement verified by: capnometry, equal breath sounds and chest rise        Number of attempts at approach: 1       Number of other approaches attempted: 0       Secured with: commercial tube lynn       Ease of procedure: easy       Dentition: Intact and Unchanged    Medication(s) Administered   propofol (DIPRIVAN) injection 10 mg/mL vial - Intravenous   150 mg - 9/16/2024 9:05:00 PM  succinylcholine (ANECTINE) 20 mg/mL injection - Intravenous   140 mg - 9/16/2024 9:05:00 PM  Medication Administration Time: 9/16/2024 9:05 PM    Additional Comments       2mg versed         decreased strength

## 2024-10-17 NOTE — H&P ADULT - EJECTION FRACTION >40 %
[facility] PHYSICIANS [facility] MED ONCOLOGY  [address]  [facility] [address]  Dept: [phone]  Loc: [phone]    Moon Lee MD   ·   MD Georgiana Mccollum APRN  ·  BELLA Torres        Hematology/Oncology   Clinic Progress Note              Patient: Linda Bustamante,  42 y.o. female    Date of Encounter: 10/17/2024     Referring Provider:  Samra Hill MD    Reason for Visit:   RIGHT breast cancer, ER/KS+, HER2-, early stage        PCP:  Yolis Chandler MD      Chief Complaint   Patient presents with    Follow-up     Invasive ductal carcinoma of breast, female, right (HCC)         Subjective:  C/o MSK symptoms which have now resolved after Cycle 1.  Here for cycle 2.  was present.        HPI from Initial Outpatient Consultation  (09/12/2024):  The patient is a 42 y.o. female who comes in for recent diagnosis of right sided breast cancer, having completed bilateral mastectomies last month. She is doing well after surgery and is accompanied by her .     She denies of significant pain, fevers, chills or bleeding.   History of her breast cancer is outlined below, but in short, the patient had felt a palpable mass, prompting mammogram done through [facility] radiology.   She completed biopsy in June of this year, confirming her diagnosis of ER/KS positive, Her2 negative breast, and LN was negative for malignancy.     She then proceeded with [right] mastectomies as well as insertion of expanders.     Today, we discussed next steps in management.     ONCOLOGIC HISTORY:    Diagnosis/Biopsy - 6/6/24          -- Diagnosis --  A.  Right breast, 12 o'clock, core needle biopsy:  Invasive ductal carcinoma, moderately differentiated (see breast  cancer marker studies and     comment).  Focal benign fibroadenoma.  B.  Right axilla, core needle biopsy:  Benign lymph node and adipose tissue segments.      Breast Cancer Marker Studies:Estrogen  yes

## 2024-11-21 NOTE — PROGRESS NOTE ADULT - SUBJECTIVE AND OBJECTIVE BOX
USMAN WILLIAN  57y  Female      Patient is a 57y old  Female who presents with a chief complaint of CHrEF Exacerbation, and  suspected Cellulitis from AICD site. (20 Nov 2022 13:30)      INTERVAL HPI/OVERNIGHT EVENTS:  She feels ok, no new ocmplaints.   Vital Signs Last 24 Hrs  T(C): 36 (26 Nov 2022 13:50), Max: 36.6 (26 Nov 2022 04:30)  T(F): 96.8 (26 Nov 2022 13:50), Max: 97.8 (26 Nov 2022 04:30)  HR: 59 (26 Nov 2022 13:50) (59 - 85)  BP: 103/65 (26 Nov 2022 13:50) (103/65 - 127/68)  BP(mean): 80 (25 Nov 2022 23:38) (80 - 80)  RR: 18 (26 Nov 2022 13:50) (18 - 18)  SpO2: 95% (26 Nov 2022 15:35) (89% - 96%)    Parameters below as of 26 Nov 2022 15:35  Patient On (Oxygen Delivery Method): nasal cannula  O2 Flow (L/min): 4        11-25-22 @ 07:01  -  11-26-22 @ 07:00  --------------------------------------------------------  IN: 1850 mL / OUT: 1805 mL / NET: 45 mL    11-26-22 @ 07:01  -  11-26-22 @ 16:33  --------------------------------------------------------  IN: 1051 mL / OUT: 600 mL / NET: 451 mL            Consultant(s) Notes Reviewed:  [x ] YES  [ ] NO          MEDICATIONS  (STANDING):  aspirin enteric coated 81 milliGRAM(s) Oral daily  atorvastatin 80 milliGRAM(s) Oral at bedtime  budesonide 160 MICROgram(s)/formoterol 4.5 MICROgram(s) Inhaler 2 Puff(s) Inhalation two times a day  divalproex  milliGRAM(s) Oral two times a day  enoxaparin Injectable 40 milliGRAM(s) SubCutaneous every 24 hours  furosemide   Injectable 40 milliGRAM(s) IV Push two times a day  influenza   Vaccine 0.5 milliLiter(s) IntraMuscular once  insulin lispro (ADMELOG) corrective regimen sliding scale   SubCutaneous three times a day before meals  insulin lispro Injectable (ADMELOG) 2 Unit(s) SubCutaneous three times a day before meals  linezolid  IVPB      linezolid  IVPB 600 milliGRAM(s) IV Intermittent every 12 hours  metoprolol succinate  milliGRAM(s) Oral daily  oxyCODONE    IR 5 milliGRAM(s) Oral once  pantoprazole    Tablet 40 milliGRAM(s) Oral before breakfast  polyethylene glycol 3350 17 Gram(s) Oral daily  potassium chloride   Powder 20 milliEquivalent(s) Oral every 2 hours  sacubitril 49 mG/valsartan 51 mG 1 Tablet(s) Oral two times a day  spironolactone 50 milliGRAM(s) Oral daily  zolpidem 5 milliGRAM(s) Oral at bedtime    MEDICATIONS  (PRN):  acetaminophen     Tablet .. 650 milliGRAM(s) Oral every 6 hours PRN Temp greater or equal to 38C (100.4F), Mild Pain (1 - 3)  albuterol    90 MICROgram(s) HFA Inhaler 2 Puff(s) Inhalation every 6 hours PRN Bronchospasm  loratadine 10 milliGRAM(s) Oral daily PRN itchiness  oxyCODONE    IR 5 milliGRAM(s) Oral every 6 hours PRN Moderate Pain (4 - 6)      LABS                          10.8   5.58  )-----------( 144      ( 26 Nov 2022 06:33 )             33.2     11-26    138  |  89<L>  |  19  ----------------------------<  99  3.4<L>   |  38<H>  |  1.3    Ca    8.5      26 Nov 2022 06:33  Mg     1.7     11-26    TPro  5.9<L>  /  Alb  3.4<L>  /  TBili  1.0  /  DBili  x   /  AST  17  /  ALT  6   /  AlkPhos  96  11-25          Lactate Trend        CAPILLARY BLOOD GLUCOSE      POCT Blood Glucose.: 158 mg/dL (26 Nov 2022 11:07)      Culture - Acid Fast - Other w/Smear (collected 11-23-22 @ 10:02)  Source: .Other None  Preliminary Report (11-26-22 @ 15:05):    Culture is being performed.    Culture - Surgical Swab (collected 11-23-22 @ 10:02)  Source: .Surgical Swab None  Preliminary Report (11-24-22 @ 15:16):    No growth    Culture - Blood (collected 11-22-22 @ 21:51)  Source: .Blood Blood  Preliminary Report (11-24-22 @ 03:01):    No growth to date.    Culture - Blood (collected 11-22-22 @ 05:21)  Source: .Blood None  Preliminary Report (11-23-22 @ 15:06):    No growth to date.    Culture - Blood (collected 11-21-22 @ 07:30)  Source: .Blood Blood-Peripheral  Preliminary Report (11-22-22 @ 23:01):    No growth to date.    Culture - Blood (collected 11-19-22 @ 22:11)  Source: .Blood Blood  Final Report (11-25-22 @ 07:01):    No Growth Final        RADIOLOGY & ADDITIONAL TESTS:    Imaging Personally Reviewed:  [ ] YES  [ ] NO    HEALTH ISSUES - PROBLEM Dx:          PHYSICAL EXAM:  GENERAL: NAD, well-developed.  HEAD:  Atraumatic, Normocephalic.  EYES: EOMI, PERRLA, conjunctiva and sclera clear.  NECK: Supple, No JVD.  CHEST/LUNG: bilateral ronchi  HEART: Regular rate and rhythm; S1 S2.   ABDOMEN: Soft, Nontender, Nondistended; Bowel sounds present.  EXTREMITIES:  2+ Peripheral Pulses, No clubbing, cyanosis, or edema.  PSYCH: AAOx3.  NEUROLOGY: chronic left side weakness.   SKIN: No rashes or lesions.   Render In Strict Bullet Format?: No Detail Level: Zone Initiate Treatment: ciclopirox 1 % shampoo \\nLather onto Scalp, Let Sit For 3-5mins Then Rinse. May Follow with regular Shampoo & Conditioner. Use Every time in the shower

## 2024-12-09 NOTE — PHYSICAL THERAPY INITIAL EVALUATION ADULT - TRANSFER TRAINING, PT EVAL
The access site was successfully closed using a (DEVICE CMPR 24CM TR BAND REG RADL ART 2 BLN TRANS ADJ STRAP) closure device. Pt will transfer from bed to chair and reverse using RW with supervision to facilitate return to PLOF.

## 2025-01-27 NOTE — ED PROVIDER NOTE - PROGRESS NOTE DETAILS
Cindi: Per MAR, medicine team will follow up duplex Render Post-Care Instructions In Note?: yes Number Of Freeze-Thaw Cycles: 1 freeze-thaw cycle Consent: The patient's consent was obtained including but not limited to risks of crusting, scabbing, blistering, scarring, darker or lighter pigmentary change, recurrence, incomplete removal and infection. Detail Level: Detailed Render Note In Bullet Format When Appropriate: No Application Tool (Optional): Liquid Nitrogen Sprayer Post-Care Instructions: I reviewed with the patient in detail post-care instructions. Patient is to wear sunprotection, and avoid picking at any of the treated lesions. Pt may apply Vaseline to crusted or scabbing areas. Duration Of Freeze Thaw-Cycle (Seconds): 4 Medical Necessity Clause: This procedure was medically necessary because the lesions that were treated were inflamed and irritated. Post-Care Instructions: I reviewed with the patient in detail post-care instructions. Patient is to wear sunprotection, and avoid picking at any of the treated lesions. Pt may apply Vaseline to crusted or scabbing areas. May get wet after 24 hours. Duration Of Freeze Thaw-Cycle (Seconds): 5-10 Spray Paint Text: The liquid nitrogen was applied to the skin utilizing a spray paint frosting technique. Number Of Freeze-Thaw Cycles: 3 freeze-thaw cycles Medical Necessity Information: It is in your best interest to select a reason for this procedure from the list below. All of these items fulfill various CMS LCD requirements except the new and changing color options.